# Patient Record
Sex: FEMALE | Race: WHITE | Employment: UNEMPLOYED | URBAN - METROPOLITAN AREA
[De-identification: names, ages, dates, MRNs, and addresses within clinical notes are randomized per-mention and may not be internally consistent; named-entity substitution may affect disease eponyms.]

---

## 2017-01-22 ASSESSMENT — ENCOUNTER SYMPTOMS
BLOOD IN STOOL: 0
TASTE DISTURBANCE: 0
VOMITING: 0
SINUS PAIN: 0
NAUSEA: 0
NECK MASS: 0
SORE THROAT: 0
TROUBLE SWALLOWING: 0
SMELL DISTURBANCE: 0
ABDOMINAL PAIN: 0
DIARRHEA: 1
HOARSE VOICE: 0
CONSTIPATION: 0
BOWEL INCONTINENCE: 0
HEARTBURN: 0
RECTAL BLEEDING: 0
BLOATING: 0
SINUS CONGESTION: 0
RECTAL PAIN: 0
JAUNDICE: 0

## 2017-01-25 ENCOUNTER — APPOINTMENT (OUTPATIENT)
Dept: LAB | Facility: CLINIC | Age: 57
End: 2017-01-25
Attending: NURSE PRACTITIONER
Payer: COMMERCIAL

## 2017-01-25 ENCOUNTER — RESEARCH ENCOUNTER (OUTPATIENT)
Dept: ONCOLOGY | Facility: CLINIC | Age: 57
End: 2017-01-25

## 2017-01-25 ENCOUNTER — ONCOLOGY VISIT (OUTPATIENT)
Dept: ONCOLOGY | Facility: CLINIC | Age: 57
End: 2017-01-25
Attending: NURSE PRACTITIONER
Payer: COMMERCIAL

## 2017-01-25 VITALS
SYSTOLIC BLOOD PRESSURE: 147 MMHG | DIASTOLIC BLOOD PRESSURE: 67 MMHG | TEMPERATURE: 97.5 F | WEIGHT: 141 LBS | BODY MASS INDEX: 23.46 KG/M2 | OXYGEN SATURATION: 99 % | HEART RATE: 100 BPM | RESPIRATION RATE: 18 BRPM

## 2017-01-25 DIAGNOSIS — Z79.899 ENCOUNTER FOR LONG-TERM (CURRENT) USE OF MEDICATIONS: ICD-10-CM

## 2017-01-25 DIAGNOSIS — D70.2 DRUG-INDUCED NEUTROPENIA (H): ICD-10-CM

## 2017-01-25 DIAGNOSIS — C56.2 OVARIAN CANCER, LEFT (H): ICD-10-CM

## 2017-01-25 DIAGNOSIS — Z00.6 PATIENT IN CANCER RELATED RESEARCH STUDY: ICD-10-CM

## 2017-01-25 DIAGNOSIS — C56.1 OVARIAN CANCER, RIGHT (H): Primary | ICD-10-CM

## 2017-01-25 LAB
ALBUMIN SERPL-MCNC: 3.8 G/DL (ref 3.4–5)
ALBUMIN UR-MCNC: NEGATIVE MG/DL
ALP SERPL-CCNC: 103 U/L (ref 40–150)
ALT SERPL W P-5'-P-CCNC: 23 U/L (ref 0–50)
AMYLASE SERPL-CCNC: 47 U/L (ref 30–110)
ANION GAP SERPL CALCULATED.3IONS-SCNC: 9 MMOL/L (ref 3–14)
APPEARANCE UR: CLEAR
AST SERPL W P-5'-P-CCNC: 18 U/L (ref 0–45)
BASOPHILS # BLD AUTO: 0 10E9/L (ref 0–0.2)
BASOPHILS NFR BLD AUTO: 0.6 %
BILIRUB SERPL-MCNC: 0.4 MG/DL (ref 0.2–1.3)
BILIRUB UR QL STRIP: NEGATIVE
BUN SERPL-MCNC: 17 MG/DL (ref 7–30)
CALCIUM SERPL-MCNC: 8.9 MG/DL (ref 8.5–10.1)
CANCER AG125 SERPL-ACNC: 108 U/ML (ref 0–30)
CHLORIDE SERPL-SCNC: 104 MMOL/L (ref 94–109)
CO2 SERPL-SCNC: 25 MMOL/L (ref 20–32)
COLOR UR AUTO: NORMAL
CREAT SERPL-MCNC: 0.75 MG/DL (ref 0.52–1.04)
DIFFERENTIAL METHOD BLD: ABNORMAL
EOSINOPHIL # BLD AUTO: 0.1 10E9/L (ref 0–0.7)
EOSINOPHIL NFR BLD AUTO: 2 %
ERYTHROCYTE [DISTWIDTH] IN BLOOD BY AUTOMATED COUNT: 13.7 % (ref 10–15)
GFR SERPL CREATININE-BSD FRML MDRD: 79 ML/MIN/1.7M2
GGT SERPL-CCNC: 23 U/L (ref 0–40)
GLUCOSE SERPL-MCNC: 109 MG/DL (ref 70–99)
GLUCOSE UR STRIP-MCNC: NEGATIVE MG/DL
HCT VFR BLD AUTO: 35.6 % (ref 35–47)
HGB BLD-MCNC: 12.3 G/DL (ref 11.7–15.7)
HGB UR QL STRIP: NEGATIVE
IMM GRANULOCYTES # BLD: 0.1 10E9/L (ref 0–0.4)
IMM GRANULOCYTES NFR BLD: 1.4 %
KETONES UR STRIP-MCNC: NEGATIVE MG/DL
LDH SERPL L TO P-CCNC: 153 U/L (ref 81–234)
LEUKOCYTE ESTERASE UR QL STRIP: NEGATIVE
LYMPHOCYTES # BLD AUTO: 2.7 10E9/L (ref 0.8–5.3)
LYMPHOCYTES NFR BLD AUTO: 40 %
MAGNESIUM SERPL-MCNC: 1.8 MG/DL (ref 1.6–2.3)
MCH RBC QN AUTO: 36.5 PG (ref 26.5–33)
MCHC RBC AUTO-ENTMCNC: 34.6 G/DL (ref 31.5–36.5)
MCV RBC AUTO: 106 FL (ref 78–100)
MONOCYTES # BLD AUTO: 0.6 10E9/L (ref 0–1.3)
MONOCYTES NFR BLD AUTO: 9.5 %
NEUTROPHILS # BLD AUTO: 3.1 10E9/L (ref 1.6–8.3)
NEUTROPHILS NFR BLD AUTO: 46.5 %
NITRATE UR QL: NEGATIVE
NRBC # BLD AUTO: 0 10*3/UL
NRBC BLD AUTO-RTO: 0 /100
PH UR STRIP: 6 PH (ref 5–7)
PLATELET # BLD AUTO: 253 10E9/L (ref 150–450)
POTASSIUM SERPL-SCNC: 3.8 MMOL/L (ref 3.4–5.3)
PROT SERPL-MCNC: 7.2 G/DL (ref 6.8–8.8)
RBC # BLD AUTO: 3.37 10E12/L (ref 3.8–5.2)
RBC #/AREA URNS AUTO: 1 /HPF (ref 0–2)
SODIUM SERPL-SCNC: 138 MMOL/L (ref 133–144)
SP GR UR STRIP: 1 (ref 1–1.03)
URN SPEC COLLECT METH UR: NORMAL
UROBILINOGEN UR STRIP-MCNC: 0 MG/DL (ref 0–2)
WBC # BLD AUTO: 6.6 10E9/L (ref 4–11)
WBC #/AREA URNS AUTO: <1 /HPF (ref 0–2)

## 2017-01-25 PROCEDURE — 80053 COMPREHEN METABOLIC PANEL: CPT | Performed by: NURSE PRACTITIONER

## 2017-01-25 PROCEDURE — 86304 IMMUNOASSAY TUMOR CA 125: CPT | Performed by: NURSE PRACTITIONER

## 2017-01-25 PROCEDURE — 85025 COMPLETE CBC W/AUTO DIFF WBC: CPT | Performed by: NURSE PRACTITIONER

## 2017-01-25 PROCEDURE — 82977 ASSAY OF GGT: CPT | Performed by: NURSE PRACTITIONER

## 2017-01-25 PROCEDURE — 36591 DRAW BLOOD OFF VENOUS DEVICE: CPT

## 2017-01-25 PROCEDURE — 82150 ASSAY OF AMYLASE: CPT | Performed by: NURSE PRACTITIONER

## 2017-01-25 PROCEDURE — 99212 OFFICE O/P EST SF 10 MIN: CPT | Mod: ZF

## 2017-01-25 PROCEDURE — 81001 URINALYSIS AUTO W/SCOPE: CPT | Performed by: NURSE PRACTITIONER

## 2017-01-25 PROCEDURE — 25000125 ZZHC RX 250: Mod: ZF | Performed by: NURSE PRACTITIONER

## 2017-01-25 PROCEDURE — 99213 OFFICE O/P EST LOW 20 MIN: CPT | Mod: ZP | Performed by: NURSE PRACTITIONER

## 2017-01-25 PROCEDURE — 83735 ASSAY OF MAGNESIUM: CPT | Performed by: NURSE PRACTITIONER

## 2017-01-25 PROCEDURE — 83615 LACTATE (LD) (LDH) ENZYME: CPT | Performed by: NURSE PRACTITIONER

## 2017-01-25 RX ORDER — HEPARIN SODIUM (PORCINE) LOCK FLUSH IV SOLN 100 UNIT/ML 100 UNIT/ML
5 SOLUTION INTRAVENOUS EVERY 8 HOURS
Status: DISCONTINUED | OUTPATIENT
Start: 2017-01-25 | End: 2017-01-25 | Stop reason: HOSPADM

## 2017-01-25 RX ADMIN — SODIUM CHLORIDE, PRESERVATIVE FREE 5 ML: 5 INJECTION INTRAVENOUS at 09:01

## 2017-01-25 ASSESSMENT — ENCOUNTER SYMPTOMS
EXERCISE INTOLERANCE: 0
SMELL DISTURBANCE: 0
NUMBNESS: 0
STIFFNESS: 0
SKIN CHANGES: 0
FLANK PAIN: 0
TACHYCARDIA: 0
DECREASED LIBIDO: 0
ARTHRALGIAS: 0
SEIZURES: 0
SYNCOPE: 0
CLAUDICATION: 0
TROUBLE SWALLOWING: 0
CHILLS: 0
COUGH DISTURBING SLEEP: 0
WHEEZING: 0
RECTAL PAIN: 0
PALPITATIONS: 0
DOUBLE VISION: 0
FATIGUE: 0
BACK PAIN: 0
DECREASED APPETITE: 0
COUGH: 0
EYE REDNESS: 0
POSTURAL DYSPNEA: 0
SLEEP DISTURBANCES DUE TO BREATHING: 0
HOT FLASHES: 0
NECK PAIN: 0
TREMORS: 0
MEMORY LOSS: 0
BREAST PAIN: 0
HEMOPTYSIS: 0
EYE WATERING: 0
LIGHT-HEADEDNESS: 0
MYALGIAS: 0
LEG PAIN: 0
WEAKNESS: 0
INCREASED ENERGY: 0
WEIGHT GAIN: 0
ABDOMINAL PAIN: 0
BREAST MASS: 0
RESPIRATORY PAIN: 0
WEIGHT LOSS: 0
BLOATING: 0
HALLUCINATIONS: 0
DISTURBANCES IN COORDINATION: 0
LEG SWELLING: 0
BLOOD IN STOOL: 0
NAUSEA: 0
SINUS CONGESTION: 0
MUSCLE CRAMPS: 0
SWOLLEN GLANDS: 0
DIZZINESS: 0
HEADACHES: 0
EXTREMITY NUMBNESS: 0
NECK MASS: 0
HYPOTENSION: 0
LOSS OF CONSCIOUSNESS: 0
SORE THROAT: 0
PARALYSIS: 0
DEPRESSION: 0
POLYPHAGIA: 0
ORTHOPNEA: 0
HOARSE VOICE: 0
EYE IRRITATION: 0
NERVOUS/ANXIOUS: 0
PANIC: 0
BOWEL INCONTINENCE: 0
SINUS PAIN: 0
MUSCLE WEAKNESS: 0
DYSPNEA ON EXERTION: 0
ALTERED TEMPERATURE REGULATION: 0
SPEECH CHANGE: 0
CONSTIPATION: 0
JOINT SWELLING: 0
POOR WOUND HEALING: 0
EYE PAIN: 0
DECREASED CONCENTRATION: 0
DIARRHEA: 1
DIFFICULTY URINATING: 0
NAIL CHANGES: 0
RECTAL BLEEDING: 0
JAUNDICE: 0
NIGHT SWEATS: 0
HYPERTENSION: 0
HEMATURIA: 0
TASTE DISTURBANCE: 0
TINGLING: 0
FEVER: 0
SNORES LOUDLY: 0
HEARTBURN: 0
SHORTNESS OF BREATH: 0
BRUISES/BLEEDS EASILY: 0
SPUTUM PRODUCTION: 0
POLYDIPSIA: 0
DYSURIA: 0
VOMITING: 0
INSOMNIA: 0

## 2017-01-25 ASSESSMENT — PAIN SCALES - GENERAL: PAINLEVEL: NO PAIN (0)

## 2017-01-25 NOTE — NURSING NOTE
Chief Complaint   Patient presents with     Port Draw     port accessed with power needle forlabs, heparin locked, vitals checked

## 2017-01-25 NOTE — PROGRESS NOTES
Patient here today for C9D1 of the Tesaro/Quadra Niraparib study.  Patient denies any new issues at this time.  Labs were reviewed and ok to continue study medication at current dose.  Patient returned 37 pills from bottle #[id number] along with pill diary.  Patient was dispensed bottle #[id number] along with new drug diary.  Patient's next appointments were made per protocol.      Prudence Jones RN     Pager [phone]

## 2017-01-25 NOTE — PROGRESS NOTES
Gynecologic Oncology Follow-Up Note  RE: Odalys Nathan  MRN: [id number]  : 1960  Date of Visit: 2017    CC: Odalys Nathan is a 56 year old year old female with recurrent stage IIIC bilateral ovarian cancer who presents today for follow up regarding disease management while on the TESARO niraparib trial.    HPI: Odalys comes to the clinic accompanied by her partner Marcos and daughter Amberly. She continues to feel well on niraparib. She has continued to take ferrous sulfate 325mg TID with resolution of her anemia- she is tolerating this without side effects. She reports taking iron has improved her baseline diarrhea. She continues with chronic tinnitus she has had since she was a child, denies any worsening of this. She self reports an ECOG score of 1. She is eating and drinking well and reports feeling ready to continue with niraparib. She is excited to go on vacation to [place] at the end of January through .      Brief Oncology History:  2012 - Admitted to hospital for 2 weeks of intermittent abdominal cramping, distention, diarrhea and N/V. CT of abdomen/pelvis significant for small bowel obstruction, a heterogenous soft tissue density in the pelvis, omental nodules, and ascites. Bilateral adnexal masses per U/S of pelvis. CA-125 was elevated at 987, CEA was normal at 1.0.    12 - Therapeutic paracentesis (4 L) with cytology confirming malignancy (UT positive, weak ER positive, CK-7 positive consistent with GYN primary). Surgery recommended d/t potential for falling blood counts 2/2 chemotherapy (patient is Sikh and limiting blood transfusion)    12 - Exploratory laparotomy, MAR BSO, lysis of adhesion, Appendectomy, Repair cystotomy, Omentectomy Nnhk-znputr-xlhypwfsi-transverse-colon resection, Ileo-descending colon anastomosis, CUSA, & Colonoscopy (done by Dr. Amato and colorectal team).  2012 - Admitted to hospital for bilateral pulmonary  emboli and drainage of pleural effusion. Started on Lovenox.  2/28/12-3/21/12: Cycle #1-2 Carbo/Taxol IV  3/29/12 - Started on Keflex by her PCP for infection in her healing wound (immediately below her umbilicus).    4/11/12-6/14/12: Cycle #3-6 IV chemo, Cycle #1 IV/IP chemo  7/16/12 -  8, CT PRADEEP - enrolled in [facility] [place] [facility] - observation arm  3/4/13-6/28/13:  6, 9, 12, 15, 20.  7/1/13: CT Chest, Abdomen, Pelvis IMPRESSION:  1. Worsening metastatic ovarian carcinoma suggested by increased size of soft tissue nodules anterior to the right psoas muscle, that may represent growing mesenteric lymphadenopathy.  2. Remaining prominent right lower quadrant mesenteric lymph nodes are not significantly changed from CT 7/16/2012.  3. Clustered nodular opacities in the right lower lobe are not significant change from 7/16/2012 and remain indeterminate. Again, the appearance and distribution is suggestive of an infectious etiology.  4. Stable 8 mm soft tissue nodule in left breast, unchanged since at least 02/20/2012. This can be observed on followup studies, but correlation with mammography could be considered.  Decision made to start Doxil/Carbo.  7/11/13: The left ventricular ejection fraction is normal at 66.4%.  7/12/13-9/6/13: Cycle #1-3 Doxil/Carbo.  10, 11.    9/30/13 CT C/A/P Impression:  1. Overall, favorable response to treatment with decreasing size of soft tissue nodules tracking along the anterior aspect of the right psoas muscle.  2. 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  3. Improved cluster of right lower lobe pulmonary nodular opacities. These may represent resolving infection.  10/3/13-12/9/13:  9, 8, 10. Cycle 4-6 Doxil/Carbo.    1/13/14 CT C/A/P Impression:   1. Stable appearance of metastatic ovarian cancer. Scattered soft tissue nodules along the anterior aspect of the right psoas muscle are unchanged in size. Mild mesenteric lymphadenopathy is unchanged.  2. Clustered  micronodules in the right lower lobe are unchanged from 9/30/2013, but improved from 7/1/2013. This history suggests a postinflammatory/postinfectious etiology.  3. 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  1/16/14 Discussed multiple options for her based on relatively stable-appearing disease on CT but slight increase in  (which has had small increase to 20 with last recurrence) including chemo break with recheck of  in 1 month, starting new chemo agent immediately, and exploratory surgery with possible resection of nodules. She is considered platinum-sensitive based on > 1 year remission after Taxol/Carbo, which we will take into consideration for future chemo planning. I am not inclined to surgery at this time given difficulty she already has with diarrhea secondary to past colon resection. I suspect we would need to resect further bowel due to mesenteric disease. Also explained inherent risks of any major surgery. Also mentioned maintenance chemo, but this has not been shown to increase overall survival and would likely decrease her quality of life without significant benefit. Family is going on vacation to [place] in 2 weeks and Odalys does not want to have chemo prior to that, so will plan to take 1 month break. She can have  at that time (discussed checking toady since last draw was in early December, but as it would likely not change treatment plan and she has h/o slow rising , will not check today).  2/17/14  16    2/20/14: Decision to take break from chemo for two months, followed by CT and CA-125.    4/21/14  27  4/21/14 CT C/A/P Impression:    1. Increased size of 2 low-attenuation lymph nodes anterior to the right psoas muscle is concerning for worsening metastatic ovarian cancer.    2. New circumferential thickening of a 3.8 cm length segment of distal transverse colon is likely physiologic. Recommend attention on followup imaging.    3. Grossly unchanged size  "of clustered small nodules versus scarring in the right lower lobe the lungs.    4. Stable thrombosis of the right ovarian vein.  5/14/14: Diagnostic laparoscopy converted to exploratory laparotomy and removal of mesenteric masses, tumor debulking, peritoneal biopsies and intraperitoneal port placement. On laparoscopy, it was noted that there were small nodules on the anterior abdominal wall near the previous incision, small nodules on the right pelvic sidewall as well. Nodules were palpated in the mesentery; however, as it was unable to clarify where the origin of the nodules was, the decision was made to open the patient. On opening there was found to be approximately a 3 cm nodule in the small bowel mesentery and another separate approximately 2 cm nodule in the bowel mesentery. Pelvis without evidence of cancer, some mesenteric lymph nodes were palpated. No evidence otherwise of any disseminated cancer throughout the abdomen.    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 (2/2)  -Largest metastasis: 1.3 cm  -See comment  D: Peritoneum, right paracolic gutter #2, biopsy:  -Fibroadipose tissue with granulomatous inflammation surrounding refractile material  -Negative for malignancy   E: Small bowel adhesion, biopsy:  -Fibroconnective tissue, consistent with adhesion  -Negative for malignancy  F: Lymph nodes, mesentry, not otherwise specified, excision:  -Two lymph nodes, negative for metastatic carcinoma (0/2)  G: Lymph node, mesentery, \"#2\", excision:  -One lymph node, negative for metastatic carcinoma (0/1)  H: Lymph nodes, mesentery, \"nodule #2\", excision:  -Five " lymph nodes, negative for metastatic carcinoma (0/5)  COMMENT:  Some of the specimens show post-operative changes. Others show possible treatment related changes, including necrosis. The metastatic carcinoma in the mesenteric lymph nodes (specimen C) shows variable morphology, including relatively low grade tumor with papillary architecture, and high grade tumor comprised of nests of tumor cells with irregular, slit-like spaces and marked nuclear pleomorphism.    5/29/14: Cycle 1 IV PACLitaxel / IP CISplatin / IP PACLitaxel.  - 28.  6/26/14: Cycle #2 IV/IP.  10  8/5/14: CT chest/abd/pelvis IMPRESSION     1. In this patient with ovarian cancer, overall findings are indicative of stable/slight improvement, as multiple mesenteric lymphadenopathy and scattered nodular peritoneal soft tissue mass lesions appear unchanged or slightly smaller since 4/21/2014.    2. 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 Mild dilatation of the second and the third portion of the duodenum with a narrow SMA angle. This could represent SMA syndrome, if clinically correlated.17/14:    Cycle #3 IV/IP.  10.    CT chest/abd/pelvis with contrast on 8/5/14    Impression:    1. In this patient with ovarian cancer, overall findings are indicative of stable/slight improvement, as multiple mesenteric lymphadenopathy and scattered nodular peritoneal soft tissue mass lesions appear unchanged or slightly smaller since 4/21/2014.    2. 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 Mild dilatation of the second and the third portion of the duodenum with a narrow SMA angle. This could represent SMA syndrome, if clinically correlated.  8/7/14: Cycle #4 Taxol/Carbo (changed from IV/IP).  10. She has been feeling okay. She is unsure if she can finish out the course of 6 cycles IV/IP taxol/cisplatin. She feels like she has the flu for about a week then starts feeling gradually better after each chemo  cycle. Her spouse notes that she actually has been more sick with the treatments than she initially admits here. She was also previously having some rib pain. Denies any rib pain now. Denies any chest pain or shortness of breath.  Plan: discussed recent CT cap results and switching to just IV as she is feeling miserable with IP treatments. Switch to IV carbo/taxol as patient is platinum sensitive.  We also discussed her taking part of the tesaro trial, which would require BRCA testing. She would like to take part in this trial if eligible.  8/20/14: Remove Intraperitoneal Port ( Port and catheter intact - discarded)  8/28/14: Cycle #5 Taxol/Carbo held due to thrombocytopenia.  6.    She denies any vaginal bleeding, no changes in her bowel or bladder habits, no nausea/emesis, no lower extremity edema, and no difficulties eating or sleeping. She denies any abdominal discomfort/bloating, no fevers or chills, and no chest pain or shortness of breath. She states her diarrhea is the same. She reports some fatigue which improves about 1-2 weeks after her chemotherapy. She states she does not need any medication refills and she was told she does not meet the criteria for the TESARO trial. She states she has 3 bags of iv fluids left over from her previous chemotherapy and will give these to herself. She states she is ready for her treatment today.    9/29/14: Cycle #6 Taxol/Carbo  6. Insurance questions regarding GSF coverage today. No concerns other than fatigue. Taking iron for anemia and does not desire blood transfusion. Using neulasta for neutropenia. Using home IV hydration if needed. Baseline unchanged. No abdominal bloating, constipation, diarrhea, pain, vaginal or rectal bleeding, cough or dyspnea, fluid retention.    10/16/14: Impression:    1. Nodular peritoneal soft tissue mass in the right lower quadrant adjacent to the psoas muscle is no longer appreciated. Adjacent prominent lymphadenopathy is  unchanged from previous exam. No new peritoneal lesions.    2. 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    10/20/14:  5. CT chest/abdomen/pelvis on 10/16/14 showed nodular peritoneal soft tissue mass in the right lower quadrant adjacent to the psoas muscle is no longer appreciated. Adjacent prominent lymphadenopathy is unchanged from previous exam. 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  1/27/15:  6.  4/28/15:  14.  5/26/15:  18.  6/2/15: CT cap Impression:  1. Postsurgical changes of hysterectomy and bilateral salpingo-oophorectomy for ovarian cancer. There is a new 8 mm hazy, ill-defined hypoattenuating lesion in hepatic segment 6 which is suspicious for a metastatic deposit. Further evaluation with ultrasound in recommended.    2. Increased size of a left retroperitoneal lymph node which is indeterminate but may represent a ciro metastasis. Mildly prominent lymph nodes in the right lower quadrant are not significantly changed.  3. Moderate colonic stool burden.    6/4/15: US abdomen IMPRESSION:    Hyperechoic lesion in the right hepatic lobe, consistent with hemangioma. This does not corresponds to the area of the lesion seen on CT from 6/2/2015. An MR would be helpful for identifying and characterizing the lesion from the recent CT.  6/12/15: MR abd IMPRESSION:  1. New 20 x 11 mm enhancing lesions between the right obliques, concerning for metastatic disease. This lesions should be amenable to percutaneous biopsy, if indicated.  2. Correlating to the lesion visualized on comparison CT is a hepatic segment 6 subcapsular 7 mm lesion. Overall the appearance favors the diagnosis of a simple cyst. However, there is faint suggestion of mild peripheral arterial enhancement. Although this is favored as  artifactual, this should be followed up to confirm stability. Recommend 6 month followup.  3. Hepatic segment 6, 5 mm lesion too  small to technically characterize. Differential would favor FNH, less likely flash filling hemangioma. Recommend attention on followup.  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     9/1/15: Cycle #1 Avastin/Cytoxan.   31.     9/24/15:feeling generally well. She says she has been having back and stomach spasms. She is taking cytosine daily (she ran out yesterday). She also says its affecting her voice. Admits that it burns occasionally. She is eating and drinking normal. She also admit diarrhea, 5-7 times daily, lose/watery. She trying to stay hydrated and eat fiber. She also says that her body is sore, especially the bottom of her feet. Her blood pressure is normal. She also admits having headache after her first infusion.      9/24/15: Cycle #2 Avastin/Cytoxan.  19.  10/15/15: Cycle #3 Avastin/Cytoxan.  16.     11/6/15: CT c/a/p IMPRESSION:    1. Stable postoperative change of MAR/BSO for ovarian cancer.  2. The lesion in the right flank abdominal musculature is slightly decreased in size. Otherwise, stable examination.  2. No evidence of metastatic disease in the chest.    11/20/15: Treatment planning visit,  16    11/25/15: surgical pathology report  FINAL DIAGNOSIS:  Soft tissue, right oblique muscle mass, excision:  -Recurrent ovarian serous carcinoma  -Carcinoma is present less than 1 mm from one resection margin  -Background skeletal muscle and fibroadipose tissue    1/4/16:  22  1/11/16-1/27/16: Radiation to right flank x 12 treatments  4/7/2016:  94. CT cap IMPRESSION:    In this patient with ovarian cancer status post MAR/BSO and descending/transverse colectomy:  1. No evidence for malignancy in the chest, abdomen, or pelvis.  2. Stable small hypodense segment 6 liver lesion, appears more likely benign, possibly a cyst.  5/13/16:   124.  6/3/16: PET CT IMPRESSION: In this patient with a history of ovarian cancer:  1. Hypermetabolic and enlarging periaortic and perihepatic lymphadenopathy compatible with metastatic disease, as detailed above.  2. Although hypodense lesion in hepatic segment 6 has been present since 6/2/2015 associated hypermetabolism makes this lesion highly concerning for metastatic disease.    Plan: to start Niraparib under TESARO study.     6/9/16:  137.  6/14/16: Cycle #1 Niraparib.    6/28/16: Cycle #1 D15 Niraparib.    7/5/16:  100.    7/11/16: Cycle #2 Nraparib.   83.    8/3/2016: PET CT IMPRESSION:    In this patient with known history of ovarian cancer:  1) New pleural based nodular opacities in the lateral and inferior aspects of the bilateral lower lobes, worse in the left lung. Likely infection. Close follow up is recommended.      2) Slight decrease in hypermetabolic abdominal lymphadenopathy. 2 hypermetabolic lymph nodes persist.  3) Unchanged right hepatic lobe metastatic lesion.     8/9/16: Cycle #3 Niraparib.  69.  9/6/16: Cycle #4 Niraparib.  53. Dose held due to anemia.  9/13/16: Eval for potential cycle 4 niraparib. Dose held due to anemia.  10/4/16: CT CAP impression:  IMPRESSION: In this patient with a known history of ovarian cancer:  1. There has been interval resolution of pleural-based nodular  opacities which likely represented infection.  2. Abdominal lymphadenopathy in the form of 2 portacaval lymph nodes  have not significantly changed in size, noted to be hypermetabolic on  prior PET/CT.  3. Previously demonstrated metastatic lesion in the right lobe of the  liver is not significantly changed.  10/11/16:  60  11/1/16: C6 niraparib,  75  11/29/16: C7 niraparib.  78. CT CAP impression as follows:  Target lesions (RECIST criteria):       A previously described target lesion superior to the head of the  pancreas (series 2, image 64)  (referred to as a  perihepatic node on  6/3/2016) may not be a valid target lesion because it measured less  than 1.5 cm originally. However, this particular node has decreased in  size, now measuring 7 mm in short axis versus 14 mm on 6/3/2016 when  measured in a similar fashion.       2.3 cm short axis portacaval lymph node on series 2 image 67,  previously 2.0 cm on 10/4/2016.       1.2 cm subtle hypodensity in hepatic segment 6 on series 2 image 75,  stable on multiple studies since at least 6/3/2016     Sum of diameters today: 3.5 cm. Sum of diameters 10/4/2016: 3.2 cm.  Growth = 9%.    12/27/16: C8 niraparib.  105.  1/25/17: C9 niraparib.  pending.    Past Medical History   Diagnosis Date     Refusal of blood transfusions as patient is Christianity      Ovarian cancer (H)      serous,stg IV     Pulmonary embolism (H) 2/2012     Antiplatelet or antithrombotic long-term use      Pleural effusion      Ascites      Blood clot in the legs      Subclinical hypothyroidism 4/18/2013     Short gut syndrome      Thrombosis of leg      Diabetes (H)        Past Surgical History   Procedure Laterality Date     Vascular surgery       stent left iliac vein     Hysterectomy total abd, zak salpingo-oophorectomy, node dissection, tumor debulking, combined  2/1/2012     Procedure:COMBINED HYSTERECTOMY TOTAL ABDOMINAL, BILATERAL SALPINGO-OOPHORECTOMY, NODE DISSECTION, TUMOR DEBULKING;  Exploratory Laparotomy, Total Abdominal Hysterectomy, Bilateral Salpingo-Oophorectomy, appendectomy,lysis of adhesions, ileal, ascending, transverse and splenic flexure resection, ileal descending bowel renanastomosis, incidental cystotomy repair, CUSA procedure and colonoscopy ; [facility]     Colonoscopy  2/1/2012     Procedure:COLONOSCOPY; With Biopsy; Surgeon:TONI SULLIVAN; Location:[facility] OR     Insert port vascular access       Insert port peritoneal access  4/3/2012     Procedure:INSERT PORT PERITONEAL ACCESS; Intraperitoneal Port Placement (c-arm);  Surgeon:SAMUEL CARRASCO; Location:[facility] OR     Colectomy       Laparoscopy diagnostic (gyn)  5/14/2014     Procedure: LAPAROSCOPY DIAGNOSTIC (GYN);  Surgeon: Nga Yeung MD;  Location: [facility] OR     Laparotomy, tumor debulking, combined  5/14/2014     Procedure: COMBINED LAPAROTOMY, TUMOR DEBULKING;  Surgeon: Nga Yeung MD;  Location: [facility] OR     Insert port peritoneal access  5/14/2014     Procedure: INSERT PORT PERITONEAL ACCESS;  Surgeon: Nga Yeung MD;  Location: [facility] OR     Remove catheter peritoneal N/A 8/20/2014     Procedure: REMOVE CATHETER PERITONEAL;  Surgeon: Nga Yeung MD;  Location: [facility] OR     Laparotomy exploratory Right 11/25/2015     Procedure: LAPAROTOMY EXPLORATORY;  Surgeon: Nga Yeung MD;  Location: [facility] OR       Current Outpatient Prescriptions   Medication     study - niraparib (IDS# 4759) 100 mg capsule     study - niraparib (IDS# 4759) 100 mg capsule     ferrous sulfate (IRON) 325 (65 FE) MG tablet     LEVOTHYROXINE SODIUM PO     iohexol (OMNIPAQUE) 140 MG/ML SOLN     order for DME     LORazepam (ATIVAN) 1 MG tablet     METFORMIN HCL PO     diphenoxylate-atropine (LOMOTIL) 2.5-0.025 MG per tablet     cyanocobalamin (VITAMIN B12) 1000 MCG/ML injection     ORDER FOR DME     magnesium oxide (MAG-OX) 400 MG tablet     ASPIRIN PO     potassium chloride (KLOR-CON) 20 MEQ packet     VITAMIN E NATURAL PO     Cholecalciferol (VITAMIN D PO)     HERBALS     Ascorbic Acid (VITAMIN C PO)     Calcium Carbonate-Vitamin D (CALCIUM + D PO)     study - niraparib (IDS# 4759) 100 mg capsule     Current Facility-Administered Medications   Medication     heparin 100 UNIT/ML injection 5 mL     Facility-Administered Medications Ordered in Other Visits   Medication     heparin 100 UNIT/ML injection 500 Units          Allergies   Allergen Reactions     Nkda [No Known Drug Allergies]        Family History   Problem Relation Age of Onset     CANCER Mother 69     lung, smoker      CANCER Maternal Uncle 65     brain     Colon Cancer Maternal Aunt 80     colon       Social History     Social History     Marital Status:      Spouse Name: N/A     Number of Children: N/A     Years of Education: N/A     Occupational History     Not on file.     Social History Main Topics     Smoking status: Former Smoker -- 8 years     Types: Cigarettes     Quit date: 06/21/1980     Smokeless tobacco: Never Used      Comment: started at 9 yo and quit at 18 yo     Alcohol Use: Yes      Comment: 3x/day wine or jodi     Drug Use: No     Sexual Activity: Not on file     Other Topics Concern     Not on file     Social History Narrative       Review of Systems     Constitutional:  Negative for fever, chills, weight loss, weight gain, fatigue, decreased appetite, night sweats, recent stressors, height gain, height loss, post-operative complications, incisional pain, hallucinations, increased energy, hyperactivity and confused.   HENT:  Positive for tinnitus. Negative for ear pain, hearing loss, nosebleeds, trouble swallowing, hoarse voice, mouth sores, sore throat, ear discharge, tooth pain, gum tenderness, taste disturbance, smell disturbance, hearing aid, bleeding gums, dry mouth, sinus pain, sinus congestion and neck mass.    Eyes:  Negative for double vision, pain, redness, eye pain, decreased vision, eye watering, eye bulging, eye dryness, flashing lights, spots, floaters, strabismus, tunnel vision, jaundice and eye irritation.   Respiratory:   Negative for cough, hemoptysis, sputum production, shortness of breath, wheezing, sleep disturbances due to breathing, snores loudly, respiratory pain, dyspnea on exertion, cough disturbing sleep and postural dyspnea.    Cardiovascular:  Negative for chest pain, dyspnea on exertion, palpitations, orthopnea, claudication, leg swelling, fingers/toes turn blue, hypertension, hypotension, syncope, history of heart murmur, chest pain on exertion, chest pain at rest,  pacemaker, few scattered varicosities, leg pain, sleep disturbances due to breathing, tachycardia, light-headedness, exercise intolerance and edema.   Gastrointestinal:  Positive for diarrhea. Negative for heartburn, nausea, vomiting, abdominal pain, constipation, blood in stool, melena, rectal pain, bloating, hemorrhoids, bowel incontinence, jaundice, rectal bleeding, coffee ground emesis and change in stool.   Genitourinary:  Negative for bladder incontinence, dysuria, urgency, hematuria, flank pain, vaginal discharge, difficulty urinating, genital sores, dyspareunia, decreased libido, nocturia, voiding less frequently, arousal difficulty, abnormal vaginal bleeding, excessive menstruation, menstrual changes, hot flashes, vaginal dryness and postmenopausal bleeding.   Musculoskeletal:  Negative for myalgias, back pain, joint swelling, arthralgias, stiffness, muscle cramps, neck pain, bone pain, muscle weakness and fracture.   Skin:  Negative for nail changes, itching, poor wound healing, rash, hair changes, skin changes, acne, warts, poor wound healing, scarring, flaky skin, Raynaud's phenomenon, sensitivity to sunlight and skin thickening.   Neurological:  Negative for dizziness, tingling, tremors, speech change, seizures, loss of consciousness, weakness, light-headedness, numbness, headaches, disturbances in coordination, extremity numbness, memory loss, difficulty walking and paralysis.   Endo/Heme:  Negative for anemia, swollen glands and bruises/bleeds easily.   Psychiatric/Behavioral:  Negative for depression, hallucinations, memory loss, decreased concentration, mood swings and panic attacks.    Breast:  Negative for breast discharge, breast mass, breast pain and nipple retraction.   Endocrine:  Negative for altered temperature regulation, polyphagia, polydipsia, unwanted hair growth and change in facial hair.        Physical Exam:    /67 mmHg  Pulse 100  Temp(Src) 97.5  F (36.4  C)  Resp 18  Wt  63.957 kg (141 lb)  SpO2 99%    General: Alert non-toxic appearing female in no acute distress  HEENT: Normocephalic, atraumatic; PERRLA; no scleral icterus; oropharynx pink without lesions; neck supple without lymphadenopathy  Pulmonary: Lungs clear to auscultation, no increased work of breathing noted  Cardiac: Tachycardic, regular rhythm, S1S2, no clicks, murmurs, rubs, or gallops; bilateral lower extremities without edema, dorsalis pedis pulses 2+ bilaterally  GI: Normoactive bowel sounds x4 quadrants, abdomen soft, non-distended, and non-tender to palpation without masses or organomegaly  : Not indicated  Heme: Cervical, supraclavicular, and inguinal nodes without lymphadenopathy  MSK: Moves all extremities, no obvious muscle wasting  Neuro: No gross deficits, normal gait  Skin: Appropriate color for race, warm and dry, no rashes or lesions to unclothed skin  Psych: Pleasant and interactive, affect bright, makes appropriate eye contact, thought process linear    Labs:      1/25/2017   Hemoglobin 11.7 - 15.7 g/dL 12.3   Hematocrit 35.0 - 47.0 % 35.6   Platelet Count 150 - 450 10e9/L 253   Absolute Neutrophil 1.6 - 8.3 10e9/L 3.1   Sodium 133 - 144 mmol/L 138   Potassium 3.4 - 5.3 mmol/L 3.8   Chloride 94 - 109 mmol/L 104   Carbon Dioxide 20 - 32 mmol/L 25   Urea Nitrogen 7 - 30 mg/dL 17   Creatinine 0.52 - 1.04 mg/dL 0.75   Calcium 8.5 - 10.1 mg/dL 8.9   Magnesium 1.6 - 2.3 mg/dL 1.8   Bilirubin Total 0.2 - 1.3 mg/dL 0.4   ALT 0 - 50 U/L 23   AST 0 - 45 U/L 18   Alkaline Phosphatase 40 - 150 U/L 103   Albumin 3.4 - 5.0 g/dL 3.8   Protein Total 6.8 - 8.8 g/dL 7.2   LD 81 - 234 U/L 153   Amylase 30 - 110 U/L 47   WBC 4.0 - 11.0 10e9/L 6.6   UA pending   pending    Assessment/Plan:  1) Recurrent stage IIIC bilateral ovarian cancer: Patient tolerating without dose limiting side effects. Proceed with cycle nine niraparib. Due for CT CAP with RECIST prior to cycle ten, orders placed. Reviewed signs and  symptoms for when she should contact the clinic or seek additional care, including but not limited to fever, chills, inability to keep down food or fluids, nausea and vomiting not controlled with antiemetics, and diarrhea leading to dehydration. Patient to contact the clinic with any questions or concerns in the interim. Betzy Jones RN in for teaching and study follow up.  2) Genetic counseling: Testing has been performed and she is negative for mutations in BRCA1, BRCA2, EPCAM, MLH1, MSH2, MSH6, PMS2, PTEN, and TP53 genes  3) Health maintenance issues discussed include to continue following up with PCP for non-gynecologic concerns.  4) Patient verbalized understanding of and agreement with plan    A total of 20 minutes spent with patient, over 50% spent in counseling and coordination of care.    HAILE De Paz, FNP-C  Family Nurse Practitioner  Gynecologic Oncology  [facility]  Pager: [phone]

## 2017-01-25 NOTE — Clinical Note
2017       RE: Odalys Nathan  [id number]  [facility] [id number]     Dear Colleague,    Thank you for referring your patient, Odalys Nathan, to the [facility]. Please see a copy of my visit note below.    Gynecologic Oncology Follow-Up Note  RE: Odalys Nathan  MRN: [id number]  : 1960  Date of Visit: 2017    CC: Odalys Nathan is a 56 year old year old female with recurrent stage IIIC bilateral ovarian cancer who presents today for follow up regarding disease management while on the TESARO niraparib trial.    HPI: Odalys comes to the clinic accompanied by her partner Marcos and daughter Amberly. She continues to feel well on niraparib. She has continued to take ferrous sulfate 325mg TID with resolution of her anemia- she is tolerating this without side effects. She reports taking iron has improved her baseline diarrhea. She continues with chronic tinnitus she has had since she was a child, denies any worsening of this. She self reports an ECOG score of 1. She is eating and drinking well and reports feeling ready to continue with niraparib. She is excited to go on vacation to [place] at the end of January through .      Brief Oncology History:  2012 - Admitted to hospital for 2 weeks of intermittent abdominal cramping, distention, diarrhea and N/V. CT of abdomen/pelvis significant for small bowel obstruction, a heterogenous soft tissue density in the pelvis, omental nodules, and ascites. Bilateral adnexal masses per U/S of pelvis. CA-125 was elevated at 987, CEA was normal at 1.0.    12 - Therapeutic paracentesis (4 L) with cytology confirming malignancy (OH positive, weak ER positive, CK-7 positive consistent with GYN primary). Surgery recommended d/t potential for falling blood counts 2/2 chemotherapy (patient is [employer]ism and limiting blood transfusion)    12 - Exploratory laparotomy, MAR BSO, lysis of adhesion,  Appendectomy, Repair cystotomy, Omentectomy Hnyn-qxjcry-gddcwbnoi-transverse-colon resection, Ileo-descending colon anastomosis, CUSA, & Colonoscopy (done by Dr. Amato and colorectal team).  2/20/2012 - Admitted to hospital for bilateral pulmonary emboli and drainage of pleural effusion. Started on Lovenox.  2/28/12-3/21/12: Cycle #1-2 Carbo/Taxol IV  3/29/12 - Started on Keflex by her PCP for infection in her healing wound (immediately below her umbilicus).    4/11/12-6/14/12: Cycle #3-6 IV chemo, Cycle #1 IV/IP chemo  7/16/12 -  8, CT PRADEEP - enrolled in  - observation arm  3/4/13-6/28/13:  6, 9, 12, 15, 20.  7/1/13: CT Chest, Abdomen, Pelvis IMPRESSION:  1. Worsening metastatic ovarian carcinoma suggested by increased size of soft tissue nodules anterior to the right psoas muscle, that may represent growing mesenteric lymphadenopathy.  2. Remaining prominent right lower quadrant mesenteric lymph nodes are not significantly changed from CT 7/16/2012.  3. Clustered nodular opacities in the right lower lobe are not significant change from 7/16/2012 and remain indeterminate. Again, the appearance and distribution is suggestive of an infectious etiology.  4. Stable 8 mm soft tissue nodule in left breast, unchanged since at least 02/20/2012. This can be observed on followup studies, but correlation with mammography could be considered.  Decision made to start Doxil/Carbo.  7/11/13: The left ventricular ejection fraction is normal at 66.4%.  7/12/13-9/6/13: Cycle #1-3 Doxil/Carbo.  10, 11.    9/30/13 CT C/A/P Impression:  1. Overall, favorable response to treatment with decreasing size of soft tissue nodules tracking along the anterior aspect of the right psoas muscle.  2. 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  3. Improved cluster of right lower lobe pulmonary nodular opacities. These may represent resolving infection.  10/3/13-12/9/13:  9, 8, 10. Cycle 4-6 Doxil/Carbo.    1/13/14  CT C/A/P Impression:   1. Stable appearance of metastatic ovarian cancer. Scattered soft tissue nodules along the anterior aspect of the right psoas muscle are unchanged in size. Mild mesenteric lymphadenopathy is unchanged.  2. Clustered micronodules in the right lower lobe are unchanged from 9/30/2013, but improved from 7/1/2013. This history suggests a postinflammatory/postinfectious etiology.  3. 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  1/16/14 Discussed multiple options for her based on relatively stable-appearing disease on CT but slight increase in  (which has had small increase to 20 with last recurrence) including chemo break with recheck of  in 1 month, starting new chemo agent immediately, and exploratory surgery with possible resection of nodules. She is considered platinum-sensitive based on > 1 year remission after Taxol/Carbo, which we will take into consideration for future chemo planning. I am not inclined to surgery at this time given difficulty she already has with diarrhea secondary to past colon resection. I suspect we would need to resect further bowel due to mesenteric disease. Also explained inherent risks of any major surgery. Also mentioned maintenance chemo, but this has not been shown to increase overall survival and would likely decrease her quality of life without significant benefit. Family is going on vacation to [place] in 2 weeks and Odalys does not want to have chemo prior to that, so will plan to take 1 month break. She can have  at that time (discussed checking toady since last draw was in early December, but as it would likely not change treatment plan and she has h/o slow rising , will not check today).  2/17/14  16    2/20/14: Decision to take break from chemo for two months, followed by CT and CA-125.    4/21/14  27  4/21/14 CT C/A/P Impression:    1. Increased size of 2 low-attenuation lymph nodes anterior to the right psoas muscle  "is concerning for worsening metastatic ovarian cancer.    2. New circumferential thickening of a 3.8 cm length segment of distal transverse colon is likely physiologic. Recommend attention on followup imaging.    3. Grossly unchanged size of clustered small nodules versus scarring in the right lower lobe the lungs.    4. Stable thrombosis of the right ovarian vein.  5/14/14: Diagnostic laparoscopy converted to exploratory laparotomy and removal of mesenteric masses, tumor debulking, peritoneal biopsies and intraperitoneal port placement. On laparoscopy, it was noted that there were small nodules on the anterior abdominal wall near the previous incision, small nodules on the right pelvic sidewall as well. Nodules were palpated in the mesentery; however, as it was unable to clarify where the origin of the nodules was, the decision was made to open the patient. On opening there was found to be approximately a 3 cm nodule in the small bowel mesentery and another separate approximately 2 cm nodule in the bowel mesentery. Pelvis without evidence of cancer, some mesenteric lymph nodes were palpated. No evidence otherwise of any disseminated cancer throughout the abdomen.    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 (2/2)  -Largest metastasis: 1.3 cm  -See comment  D: Peritoneum, right paracolic gutter #2, biopsy:  -Fibroadipose tissue with granulomatous inflammation surrounding refractile material  -Negative for malignancy   E: Small bowel adhesion, biopsy:  -Fibroconnective tissue, consistent with adhesion  -Negative for malignancy  F: Lymph nodes, mesentry, not otherwise " "specified, excision:  -Two lymph nodes, negative for metastatic carcinoma (0/2)  G: Lymph node, mesentery, \"#2\", excision:  -One lymph node, negative for metastatic carcinoma (0/1)  H: Lymph nodes, mesentery, \"nodule #2\", excision:  -Five lymph nodes, negative for metastatic carcinoma (0/5)  COMMENT:  Some of the specimens show post-operative changes. Others show possible treatment related changes, including necrosis. The metastatic carcinoma in the mesenteric lymph nodes (specimen C) shows variable morphology, including relatively low grade tumor with papillary architecture, and high grade tumor comprised of nests of tumor cells with irregular, slit-like spaces and marked nuclear pleomorphism.    5/29/14: Cycle 1 IV PACLitaxel / IP CISplatin / IP PACLitaxel.  - 28.  6/26/14: Cycle #2 IV/IP.  10  8/5/14: CT chest/abd/pelvis IMPRESSION     1. In this patient with ovarian cancer, overall findings are indicative of stable/slight improvement, as multiple mesenteric lymphadenopathy and scattered nodular peritoneal soft tissue mass lesions appear unchanged or slightly smaller since 4/21/2014.    2. 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 Mild dilatation of the second and the third portion of the duodenum with a narrow SMA angle. This could represent SMA syndrome, if clinically correlated.17/14:    Cycle #3 IV/IP.  10.    CT chest/abd/pelvis with contrast on 8/5/14    Impression:    1. In this patient with ovarian cancer, overall findings are indicative of stable/slight improvement, as multiple mesenteric lymphadenopathy and scattered nodular peritoneal soft tissue mass lesions appear unchanged or slightly smaller since 4/21/2014.    2. 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 Mild dilatation of the second and the third portion of the duodenum with a narrow SMA angle. This could represent SMA syndrome, if clinically correlated.  8/7/14: Cycle #4 Taxol/Carbo (changed " from IV/IP).  10. She has been feeling okay. She is unsure if she can finish out the course of 6 cycles IV/IP taxol/cisplatin. She feels like she has the flu for about a week then starts feeling gradually better after each chemo cycle. Her spouse notes that she actually has been more sick with the treatments than she initially admits here. She was also previously having some rib pain. Denies any rib pain now. Denies any chest pain or shortness of breath.  Plan: discussed recent CT cap results and switching to just IV as she is feeling miserable with IP treatments. Switch to IV carbo/taxol as patient is platinum sensitive.  We also discussed her taking part of the tesaro trial, which would require BRCA testing. She would like to take part in this trial if eligible.  8/20/14: Remove Intraperitoneal Port ( Port and catheter intact - discarded)  8/28/14: Cycle #5 Taxol/Carbo held due to thrombocytopenia.  6.    She denies any vaginal bleeding, no changes in her bowel or bladder habits, no nausea/emesis, no lower extremity edema, and no difficulties eating or sleeping. She denies any abdominal discomfort/bloating, no fevers or chills, and no chest pain or shortness of breath. She states her diarrhea is the same. She reports some fatigue which improves about 1-2 weeks after her chemotherapy. She states she does not need any medication refills and she was told she does not meet the criteria for the TESARO trial. She states she has 3 bags of iv fluids left over from her previous chemotherapy and will give these to herself. She states she is ready for her treatment today.    9/29/14: Cycle #6 Taxol/Carbo  6. Insurance questions regarding GSF coverage today. No concerns other than fatigue. Taking iron for anemia and does not desire blood transfusion. Using neulasta for neutropenia. Using home IV hydration if needed. Baseline unchanged. No abdominal bloating, constipation, diarrhea, pain, vaginal or rectal  bleeding, cough or dyspnea, fluid retention.    10/16/14: Impression:    1. Nodular peritoneal soft tissue mass in the right lower quadrant adjacent to the psoas muscle is no longer appreciated. Adjacent prominent lymphadenopathy is unchanged from previous exam. No new peritoneal lesions.    2. 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    10/20/14:  5. CT chest/abdomen/pelvis on 10/16/14 showed nodular peritoneal soft tissue mass in the right lower quadrant adjacent to the psoas muscle is no longer appreciated. Adjacent prominent lymphadenopathy is unchanged from previous exam. 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  1/27/15:  6.  4/28/15:  14.  5/26/15:  18.  6/2/15: CT cap Impression:  1. Postsurgical changes of hysterectomy and bilateral salpingo-oophorectomy for ovarian cancer. There is a new 8 mm hazy, ill-defined hypoattenuating lesion in hepatic segment 6 which is suspicious for a metastatic deposit. Further evaluation with ultrasound in recommended.    2. Increased size of a left retroperitoneal lymph node which is indeterminate but may represent a ciro metastasis. Mildly prominent lymph nodes in the right lower quadrant are not significantly changed.  3. Moderate colonic stool burden.    6/4/15: US abdomen IMPRESSION:    Hyperechoic lesion in the right hepatic lobe, consistent with hemangioma. This does not corresponds to the area of the lesion seen on CT from 6/2/2015. An MR would be helpful for identifying and characterizing the lesion from the recent CT.  6/12/15: MR abd IMPRESSION:  1. New 20 x 11 mm enhancing lesions between the right obliques, concerning for metastatic disease. This lesions should be amenable to percutaneous biopsy, if indicated.  2. Correlating to the lesion visualized on comparison CT is a hepatic segment 6 subcapsular 7 mm lesion. Overall the appearance favors the diagnosis of a simple cyst.  However, there is faint suggestion of mild peripheral arterial enhancement. Although this is favored as  artifactual, this should be followed up to confirm stability. Recommend 6 month followup.  3. Hepatic segment 6, 5 mm lesion too small to technically characterize. Differential would favor FNH, less likely flash filling hemangioma. Recommend attention on followup.  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     9/1/15: Cycle #1 Avastin/Cytoxan.   31.     9/24/15:feeling generally well. She says she has been having back and stomach spasms. She is taking cytosine daily (she ran out yesterday). She also says its affecting her voice. Admits that it burns occasionally. She is eating and drinking normal. She also admit diarrhea, 5-7 times daily, lose/watery. She trying to stay hydrated and eat fiber. She also says that her body is sore, especially the bottom of her feet. Her blood pressure is normal. She also admits having headache after her first infusion.      9/24/15: Cycle #2 Avastin/Cytoxan.  19.  10/15/15: Cycle #3 Avastin/Cytoxan.  16.     11/6/15: CT c/a/p IMPRESSION:    1. Stable postoperative change of MAR/BSO for ovarian cancer.  2. The lesion in the right flank abdominal musculature is slightly decreased in size. Otherwise, stable examination.  2. No evidence of metastatic disease in the chest.    11/20/15: Treatment planning visit,  16    11/25/15: surgical pathology report  FINAL DIAGNOSIS:  Soft tissue, right oblique muscle mass, excision:  -Recurrent ovarian serous carcinoma  -Carcinoma is present less than 1 mm from one resection margin  -Background skeletal muscle and fibroadipose tissue    1/4/16:  22  1/11/16-1/27/16: Radiation to right flank x 12 treatments  4/7/2016:  94. CT cap IMPRESSION:    In this patient with ovarian cancer  status post MAR/BSO and descending/transverse colectomy:  1. No evidence for malignancy in the chest, abdomen, or pelvis.  2. Stable small hypodense segment 6 liver lesion, appears more likely benign, possibly a cyst.  5/13/16:  124.  6/3/16: PET CT IMPRESSION: In this patient with a history of ovarian cancer:  1. Hypermetabolic and enlarging periaortic and perihepatic lymphadenopathy compatible with metastatic disease, as detailed above.  2. Although hypodense lesion in hepatic segment 6 has been present since 6/2/2015 associated hypermetabolism makes this lesion highly concerning for metastatic disease.    Plan: to start Niraparib under TESARO study.     6/9/16:  137.  6/14/16: Cycle #1 Niraparib.    6/28/16: Cycle #1 D15 Niraparib.    7/5/16:  100.    7/11/16: Cycle #2 Nraparib.   83.    8/3/2016: PET CT IMPRESSION:    In this patient with known history of ovarian cancer:  1) New pleural based nodular opacities in the lateral and inferior aspects of the bilateral lower lobes, worse in the left lung. Likely infection. Close follow up is recommended.      2) Slight decrease in hypermetabolic abdominal lymphadenopathy. 2 hypermetabolic lymph nodes persist.  3) Unchanged right hepatic lobe metastatic lesion.     8/9/16: Cycle #3 Niraparib.  69.  9/6/16: Cycle #4 Niraparib.  53. Dose held due to anemia.  9/13/16: Eval for potential cycle 4 niraparib. Dose held due to anemia.  10/4/16: CT CAP impression:  IMPRESSION: In this patient with a known history of ovarian cancer:  1. There has been interval resolution of pleural-based nodular  opacities which likely represented infection.  2. Abdominal lymphadenopathy in the form of 2 portacaval lymph nodes  have not significantly changed in size, noted to be hypermetabolic on  prior PET/CT.  3. Previously demonstrated metastatic lesion in the right lobe of the  liver is not significantly changed.  10/11/16:  60  11/1/16: C6 niraparib,   75  11/29/16: C7 niraparib.  78. CT CAP impression as follows:  Target lesions (RECIST criteria):       A previously described target lesion superior to the head of the  pancreas (series 2, image 64)  (referred to as a perihepatic node on  6/3/2016) may not be a valid target lesion because it measured less  than 1.5 cm originally. However, this particular node has decreased in  size, now measuring 7 mm in short axis versus 14 mm on 6/3/2016 when  measured in a similar fashion.       2.3 cm short axis portacaval lymph node on series 2 image 67,  previously 2.0 cm on 10/4/2016.       1.2 cm subtle hypodensity in hepatic segment 6 on series 2 image 75,  stable on multiple studies since at least 6/3/2016     Sum of diameters today: 3.5 cm. Sum of diameters 10/4/2016: 3.2 cm.  Growth = 9%.    12/27/16: C8 niraparib.  105.  1/25/17: C9 niraparib.  pending.    Past Medical History   Diagnosis Date     Refusal of blood transfusions as patient is [employer]      Ovarian cancer (H)      serous,stg IV     Pulmonary embolism (H) 2/2012     Antiplatelet or antithrombotic long-term use      Pleural effusion      Ascites      Blood clot in the legs      Subclinical hypothyroidism 4/18/2013     Short gut syndrome      Thrombosis of leg      Diabetes (H)        Past Surgical History   Procedure Laterality Date     Vascular surgery       stent left iliac vein     Hysterectomy total abd, zak salpingo-oophorectomy, node dissection, tumor debulking, combined  2/1/2012     Procedure:COMBINED HYSTERECTOMY TOTAL ABDOMINAL, BILATERAL SALPINGO-OOPHORECTOMY, NODE DISSECTION, TUMOR DEBULKING;  Exploratory Laparotomy, Total Abdominal Hysterectomy, Bilateral Salpingo-Oophorectomy, appendectomy,lysis of adhesions, ileal, ascending, transverse and splenic flexure resection, ileal descending bowel renanastomosis, incidental cystotomy repair, CUSA procedure and colonoscopy ; [facility]     Colonoscopy  2/1/2012      Procedure:COLONOSCOPY; With Biopsy; Surgeon:TONI SULLIVAN; Location:[facility] OR     Insert port vascular access       Insert port peritoneal access  4/3/2012     Procedure:INSERT PORT PERITONEAL ACCESS; Intraperitoneal Port Placement (c-arm); Surgeon:SAMUEL CARRASCO; Location:[facility] OR     Colectomy       Laparoscopy diagnostic (gyn)  5/14/2014     Procedure: LAPAROSCOPY DIAGNOSTIC (GYN);  Surgeon: Nga Yeung MD;  Location: [facility] OR     Laparotomy, tumor debulking, combined  5/14/2014     Procedure: COMBINED LAPAROTOMY, TUMOR DEBULKING;  Surgeon: Nga Yeung MD;  Location: [facility] OR     Insert port peritoneal access  5/14/2014     Procedure: INSERT PORT PERITONEAL ACCESS;  Surgeon: Nga Yeung MD;  Location: [facility] OR     Remove catheter peritoneal N/A 8/20/2014     Procedure: REMOVE CATHETER PERITONEAL;  Surgeon: Nga Yeung MD;  Location: [facility] OR     Laparotomy exploratory Right 11/25/2015     Procedure: LAPAROTOMY EXPLORATORY;  Surgeon: Nga Yeung MD;  Location: [facility] OR       Current Outpatient Prescriptions   Medication     study - niraparib (IDS# 4759) 100 mg capsule     study - niraparib (IDS# 4759) 100 mg capsule     ferrous sulfate (IRON) 325 (65 FE) MG tablet     LEVOTHYROXINE SODIUM PO     iohexol (OMNIPAQUE) 140 MG/ML SOLN     order for DME     LORazepam (ATIVAN) 1 MG tablet     METFORMIN HCL PO     diphenoxylate-atropine (LOMOTIL) 2.5-0.025 MG per tablet     cyanocobalamin (VITAMIN B12) 1000 MCG/ML injection     ORDER FOR DME     magnesium oxide (MAG-OX) 400 MG tablet     ASPIRIN PO     potassium chloride (KLOR-CON) 20 MEQ packet     VITAMIN E NATURAL PO     Cholecalciferol (VITAMIN D PO)     HERBALS     Ascorbic Acid (VITAMIN C PO)     Calcium Carbonate-Vitamin D (CALCIUM + D PO)     study - niraparib (IDS# 4759) 100 mg capsule     Current Facility-Administered Medications   Medication     heparin 100 UNIT/ML injection 5 mL     Facility-Administered Medications Ordered in  Other Visits   Medication     heparin 100 UNIT/ML injection 500 Units          Allergies   Allergen Reactions     Nkda [No Known Drug Allergies]        Family History   Problem Relation Age of Onset     CANCER Mother 69     lung, smoker     CANCER Maternal Uncle 65     brain     Colon Cancer Maternal Aunt 80     colon       Social History     Social History     Marital Status:      Spouse Name: N/A     Number of Children: N/A     Years of Education: N/A     Occupational History     Not on file.     Social History Main Topics     Smoking status: Former Smoker -- 8 years     Types: Cigarettes     Quit date: 06/21/1980     Smokeless tobacco: Never Used      Comment: started at 9 yo and quit at 18 yo     Alcohol Use: Yes      Comment: 3x/day wine or jodi     Drug Use: No     Sexual Activity: Not on file     Other Topics Concern     Not on file     Social History Narrative       Review of Systems     Constitutional:  Negative for fever, chills, weight loss, weight gain, fatigue, decreased appetite, night sweats, recent stressors, height gain, height loss, post-operative complications, incisional pain, hallucinations, increased energy, hyperactivity and confused.   HENT:  Positive for tinnitus. Negative for ear pain, hearing loss, nosebleeds, trouble swallowing, hoarse voice, mouth sores, sore throat, ear discharge, tooth pain, gum tenderness, taste disturbance, smell disturbance, hearing aid, bleeding gums, dry mouth, sinus pain, sinus congestion and neck mass.    Eyes:  Negative for double vision, pain, redness, eye pain, decreased vision, eye watering, eye bulging, eye dryness, flashing lights, spots, floaters, strabismus, tunnel vision, jaundice and eye irritation.   Respiratory:   Negative for cough, hemoptysis, sputum production, shortness of breath, wheezing, sleep disturbances due to breathing, snores loudly, respiratory pain, dyspnea on exertion, cough disturbing sleep and postural dyspnea.     Cardiovascular:  Negative for chest pain, dyspnea on exertion, palpitations, orthopnea, claudication, leg swelling, fingers/toes turn blue, hypertension, hypotension, syncope, history of heart murmur, chest pain on exertion, chest pain at rest, pacemaker, few scattered varicosities, leg pain, sleep disturbances due to breathing, tachycardia, light-headedness, exercise intolerance and edema.   Gastrointestinal:  Positive for diarrhea. Negative for heartburn, nausea, vomiting, abdominal pain, constipation, blood in stool, melena, rectal pain, bloating, hemorrhoids, bowel incontinence, jaundice, rectal bleeding, coffee ground emesis and change in stool.   Genitourinary:  Negative for bladder incontinence, dysuria, urgency, hematuria, flank pain, vaginal discharge, difficulty urinating, genital sores, dyspareunia, decreased libido, nocturia, voiding less frequently, arousal difficulty, abnormal vaginal bleeding, excessive menstruation, menstrual changes, hot flashes, vaginal dryness and postmenopausal bleeding.   Musculoskeletal:  Negative for myalgias, back pain, joint swelling, arthralgias, stiffness, muscle cramps, neck pain, bone pain, muscle weakness and fracture.   Skin:  Negative for nail changes, itching, poor wound healing, rash, hair changes, skin changes, acne, warts, poor wound healing, scarring, flaky skin, Raynaud's phenomenon, sensitivity to sunlight and skin thickening.   Neurological:  Negative for dizziness, tingling, tremors, speech change, seizures, loss of consciousness, weakness, light-headedness, numbness, headaches, disturbances in coordination, extremity numbness, memory loss, difficulty walking and paralysis.   Endo/Heme:  Negative for anemia, swollen glands and bruises/bleeds easily.   Psychiatric/Behavioral:  Negative for depression, hallucinations, memory loss, decreased concentration, mood swings and panic attacks.    Breast:  Negative for breast discharge, breast mass, breast pain and  nipple retraction.   Endocrine:  Negative for altered temperature regulation, polyphagia, polydipsia, unwanted hair growth and change in facial hair.        Physical Exam:    /67 mmHg  Pulse 100  Temp(Src) 97.5  F (36.4  C)  Resp 18  Wt 63.957 kg (141 lb)  SpO2 99%    General: Alert non-toxic appearing female in no acute distress  HEENT: Normocephalic, atraumatic; PERRLA; no scleral icterus; oropharynx pink without lesions; neck supple without lymphadenopathy  Pulmonary: Lungs clear to auscultation, no increased work of breathing noted  Cardiac: Tachycardic, regular rhythm, S1S2, no clicks, murmurs, rubs, or gallops; bilateral lower extremities without edema, dorsalis pedis pulses 2+ bilaterally  GI: Normoactive bowel sounds x4 quadrants, abdomen soft, non-distended, and non-tender to palpation without masses or organomegaly  : Not indicated  Heme: Cervical, supraclavicular, and inguinal nodes without lymphadenopathy  MSK: Moves all extremities, no obvious muscle wasting  Neuro: No gross deficits, normal gait  Skin: Appropriate color for race, warm and dry, no rashes or lesions to unclothed skin  Psych: Pleasant and interactive, affect bright, makes appropriate eye contact, thought process linear    Labs:      1/25/2017   Hemoglobin 11.7 - 15.7 g/dL 12.3   Hematocrit 35.0 - 47.0 % 35.6   Platelet Count 150 - 450 10e9/L 253   Absolute Neutrophil 1.6 - 8.3 10e9/L 3.1   Sodium 133 - 144 mmol/L 138   Potassium 3.4 - 5.3 mmol/L 3.8   Chloride 94 - 109 mmol/L 104   Carbon Dioxide 20 - 32 mmol/L 25   Urea Nitrogen 7 - 30 mg/dL 17   Creatinine 0.52 - 1.04 mg/dL 0.75   Calcium 8.5 - 10.1 mg/dL 8.9   Magnesium 1.6 - 2.3 mg/dL 1.8   Bilirubin Total 0.2 - 1.3 mg/dL 0.4   ALT 0 - 50 U/L 23   AST 0 - 45 U/L 18   Alkaline Phosphatase 40 - 150 U/L 103   Albumin 3.4 - 5.0 g/dL 3.8   Protein Total 6.8 - 8.8 g/dL 7.2   LD 81 - 234 U/L 153   Amylase 30 - 110 U/L 47   WBC 4.0 - 11.0 10e9/L 6.6   UA pending    pending    Assessment/Plan:  1) Recurrent stage IIIC bilateral ovarian cancer: Patient tolerating without dose limiting side effects. Proceed with cycle nine niraparib. Due for CT CAP with RECIST prior to cycle ten, orders placed. Reviewed signs and symptoms for when she should contact the clinic or seek additional care, including but not limited to fever, chills, inability to keep down food or fluids, nausea and vomiting not controlled with antiemetics, and diarrhea leading to dehydration. Patient to contact the clinic with any questions or concerns in the interim. Betzy Jones RN in for teaching and study follow up.  2) Genetic counseling: Testing has been performed and she is negative for mutations in BRCA1, BRCA2, EPCAM, MLH1, MSH2, MSH6, PMS2, PTEN, and TP53 genes  3) Health maintenance issues discussed include to continue following up with PCP for non-gynecologic concerns.  4) Patient verbalized understanding of and agreement with plan    A total of 20 minutes spent with patient, over 50% spent in counseling and coordination of care.    HAILE De Paz, FNP-C  Family Nurse Practitioner  Gynecologic Oncology  [facility]  Pager: [phone]

## 2017-01-25 NOTE — NURSING NOTE
"Odalys Nathan is a 56 year old female who presents for:  Chief Complaint   Patient presents with     Port Draw     port accessed with power needle forlabs, heparin locked, vitals checked     Oncology Clinic Visit     Ovarian CA        Initial Vitals:  /67 mmHg  Pulse 100  Temp(Src) 97.5  F (36.4  C)  Resp 18  Wt 63.957 kg (141 lb)  SpO2 99% Estimated body mass index is 23.46 kg/(m^2) as calculated from the following:    Height as of 12/27/16: 1.651 m (5' 5\").    Weight as of this encounter: 63.957 kg (141 lb).. There is no height on file to calculate BSA. BP completed using cuff size: regular  No Pain (0) No LMP recorded. Patient has had a hysterectomy. Allergies and medications reviewed.     Medications: Medication refills not needed today.  Pharmacy name entered into [employer]:    [id number] - [place], [place] - [address] [facility] - [place], [place] - [address]  [facility] - [place], [place] - [address]    Comments: UA sent to lab for analysis.      5 minutes for nursing intake (face to face time)   Sophia Oneill CMA          "

## 2017-02-09 DIAGNOSIS — C56.1 OVARIAN CANCER, RIGHT (H): Primary | ICD-10-CM

## 2017-02-09 DIAGNOSIS — C56.2 OVARIAN CANCER, LEFT (H): ICD-10-CM

## 2017-02-09 DIAGNOSIS — D64.9 ANEMIA, UNSPECIFIED TYPE: ICD-10-CM

## 2017-02-09 RX ORDER — FERROUS SULFATE 325(65) MG
325 TABLET ORAL
Qty: 90 TABLET | Refills: 3 | Status: SHIPPED | OUTPATIENT
Start: 2017-02-09 | End: 2017-06-06

## 2017-02-09 NOTE — TELEPHONE ENCOUNTER
Pharmacy/patient calling for refill of ferrous sulfate for the patient  Last visit with MD 1/25/17  Last order date     Disp Refills Start End JHON      ferrous sulfate (IRON) 325 (65 FE) MG tablet (Discontinued) 90 tablet 3 9/14/2016 2/9/2017 No     Sig: Take 1 tablet (325 mg) by mouth 3 times daily (with meals) Take with small amount of orange juice, do not take with calcium       Please advise on refills for patient

## 2017-02-17 ASSESSMENT — ENCOUNTER SYMPTOMS
RECTAL PAIN: 0
BLOATING: 0
TASTE DISTURBANCE: 0
SMELL DISTURBANCE: 0
PANIC: 0
SINUS PAIN: 0
RECTAL BLEEDING: 0
DEPRESSION: 0
BLOOD IN STOOL: 0
DIARRHEA: 1
NECK MASS: 0
INSOMNIA: 0
ABDOMINAL PAIN: 0
HEARTBURN: 0
TROUBLE SWALLOWING: 0
SORE THROAT: 0
NAUSEA: 0
DECREASED CONCENTRATION: 0
JAUNDICE: 0
BOWEL INCONTINENCE: 0
NERVOUS/ANXIOUS: 1
CONSTIPATION: 0
VOMITING: 0
SINUS CONGESTION: 0
HOARSE VOICE: 0

## 2017-02-22 ASSESSMENT — ENCOUNTER SYMPTOMS
INSOMNIA: 0
NERVOUS/ANXIOUS: 0

## 2017-02-23 ENCOUNTER — RESEARCH ENCOUNTER (OUTPATIENT)
Dept: ONCOLOGY | Facility: CLINIC | Age: 57
End: 2017-02-23

## 2017-02-23 ENCOUNTER — ONCOLOGY VISIT (OUTPATIENT)
Dept: ONCOLOGY | Facility: CLINIC | Age: 57
End: 2017-02-23
Attending: OBSTETRICS & GYNECOLOGY
Payer: COMMERCIAL

## 2017-02-23 ENCOUNTER — APPOINTMENT (OUTPATIENT)
Dept: LAB | Facility: CLINIC | Age: 57
End: 2017-02-23
Attending: OBSTETRICS & GYNECOLOGY
Payer: COMMERCIAL

## 2017-02-23 VITALS
OXYGEN SATURATION: 99 % | SYSTOLIC BLOOD PRESSURE: 134 MMHG | HEART RATE: 104 BPM | WEIGHT: 138.7 LBS | BODY MASS INDEX: 23.08 KG/M2 | DIASTOLIC BLOOD PRESSURE: 84 MMHG | TEMPERATURE: 98 F | RESPIRATION RATE: 18 BRPM

## 2017-02-23 DIAGNOSIS — C56.2 OVARIAN CANCER, LEFT (H): ICD-10-CM

## 2017-02-23 DIAGNOSIS — C56.1 OVARIAN CANCER, RIGHT (H): Primary | ICD-10-CM

## 2017-02-23 DIAGNOSIS — D70.2 DRUG-INDUCED NEUTROPENIA (H): ICD-10-CM

## 2017-02-23 LAB
ALBUMIN SERPL-MCNC: 3.6 G/DL (ref 3.4–5)
ALBUMIN UR-MCNC: NEGATIVE MG/DL
ALP SERPL-CCNC: 110 U/L (ref 40–150)
ALT SERPL W P-5'-P-CCNC: 23 U/L (ref 0–50)
AMYLASE SERPL-CCNC: 28 U/L (ref 30–110)
ANION GAP SERPL CALCULATED.3IONS-SCNC: 12 MMOL/L (ref 3–14)
APPEARANCE UR: CLEAR
AST SERPL W P-5'-P-CCNC: 20 U/L (ref 0–45)
BASOPHILS # BLD AUTO: 0 10E9/L (ref 0–0.2)
BASOPHILS NFR BLD AUTO: 0.7 %
BILIRUB SERPL-MCNC: 0.4 MG/DL (ref 0.2–1.3)
BILIRUB UR QL STRIP: NEGATIVE
BUN SERPL-MCNC: 17 MG/DL (ref 7–30)
CALCIUM SERPL-MCNC: 8.8 MG/DL (ref 8.5–10.1)
CANCER AG125 SERPL-ACNC: 132 U/ML (ref 0–30)
CHLORIDE SERPL-SCNC: 103 MMOL/L (ref 94–109)
CO2 SERPL-SCNC: 24 MMOL/L (ref 20–32)
COLOR UR AUTO: YELLOW
CREAT SERPL-MCNC: 0.77 MG/DL (ref 0.52–1.04)
DIFFERENTIAL METHOD BLD: ABNORMAL
EOSINOPHIL # BLD AUTO: 0.1 10E9/L (ref 0–0.7)
EOSINOPHIL NFR BLD AUTO: 1.6 %
ERYTHROCYTE [DISTWIDTH] IN BLOOD BY AUTOMATED COUNT: 13.4 % (ref 10–15)
GFR SERPL CREATININE-BSD FRML MDRD: 77 ML/MIN/1.7M2
GGT SERPL-CCNC: 29 U/L (ref 0–40)
GLUCOSE SERPL-MCNC: 94 MG/DL (ref 70–99)
GLUCOSE UR STRIP-MCNC: NEGATIVE MG/DL
HCT VFR BLD AUTO: 36.2 % (ref 35–47)
HGB BLD-MCNC: 12.4 G/DL (ref 11.7–15.7)
HGB UR QL STRIP: NEGATIVE
IMM GRANULOCYTES # BLD: 0 10E9/L (ref 0–0.4)
IMM GRANULOCYTES NFR BLD: 0.3 %
KETONES UR STRIP-MCNC: NEGATIVE MG/DL
LDH SERPL L TO P-CCNC: 154 U/L (ref 81–234)
LEUKOCYTE ESTERASE UR QL STRIP: NEGATIVE
LYMPHOCYTES # BLD AUTO: 2.2 10E9/L (ref 0.8–5.3)
LYMPHOCYTES NFR BLD AUTO: 37.9 %
MAGNESIUM SERPL-MCNC: 1.7 MG/DL (ref 1.6–2.3)
MCH RBC QN AUTO: 37 PG (ref 26.5–33)
MCHC RBC AUTO-ENTMCNC: 34.3 G/DL (ref 31.5–36.5)
MCV RBC AUTO: 108 FL (ref 78–100)
MONOCYTES # BLD AUTO: 0.7 10E9/L (ref 0–1.3)
MONOCYTES NFR BLD AUTO: 12.6 %
MUCOUS THREADS #/AREA URNS LPF: PRESENT /LPF
NEUTROPHILS # BLD AUTO: 2.7 10E9/L (ref 1.6–8.3)
NEUTROPHILS NFR BLD AUTO: 46.9 %
NITRATE UR QL: NEGATIVE
NRBC # BLD AUTO: 0 10*3/UL
NRBC BLD AUTO-RTO: 0 /100
PH UR STRIP: 5 PH (ref 5–7)
PLATELET # BLD AUTO: 223 10E9/L (ref 150–450)
POTASSIUM SERPL-SCNC: 3.4 MMOL/L (ref 3.4–5.3)
PROT SERPL-MCNC: 7.1 G/DL (ref 6.8–8.8)
RBC # BLD AUTO: 3.35 10E12/L (ref 3.8–5.2)
RBC #/AREA URNS AUTO: 0 /HPF (ref 0–2)
SODIUM SERPL-SCNC: 139 MMOL/L (ref 133–144)
SP GR UR STRIP: 1.01 (ref 1–1.03)
URN SPEC COLLECT METH UR: ABNORMAL
UROBILINOGEN UR STRIP-MCNC: 0 MG/DL (ref 0–2)
WBC # BLD AUTO: 5.7 10E9/L (ref 4–11)
WBC #/AREA URNS AUTO: <1 /HPF (ref 0–2)

## 2017-02-23 PROCEDURE — 82150 ASSAY OF AMYLASE: CPT | Performed by: OBSTETRICS & GYNECOLOGY

## 2017-02-23 PROCEDURE — 82977 ASSAY OF GGT: CPT | Performed by: OBSTETRICS & GYNECOLOGY

## 2017-02-23 PROCEDURE — 80053 COMPREHEN METABOLIC PANEL: CPT | Performed by: OBSTETRICS & GYNECOLOGY

## 2017-02-23 PROCEDURE — 25000128 H RX IP 250 OP 636: Mod: ZF | Performed by: OBSTETRICS & GYNECOLOGY

## 2017-02-23 PROCEDURE — 99212 OFFICE O/P EST SF 10 MIN: CPT

## 2017-02-23 PROCEDURE — 85025 COMPLETE CBC W/AUTO DIFF WBC: CPT | Performed by: OBSTETRICS & GYNECOLOGY

## 2017-02-23 PROCEDURE — 86304 IMMUNOASSAY TUMOR CA 125: CPT | Performed by: OBSTETRICS & GYNECOLOGY

## 2017-02-23 PROCEDURE — 99207 ZZC NON-BILLABLE SERV PER CHARTING: CPT | Mod: ZP | Performed by: OBSTETRICS & GYNECOLOGY

## 2017-02-23 PROCEDURE — 83735 ASSAY OF MAGNESIUM: CPT | Performed by: OBSTETRICS & GYNECOLOGY

## 2017-02-23 PROCEDURE — 99212 OFFICE O/P EST SF 10 MIN: CPT | Mod: ZF

## 2017-02-23 PROCEDURE — 83615 LACTATE (LD) (LDH) ENZYME: CPT | Performed by: OBSTETRICS & GYNECOLOGY

## 2017-02-23 PROCEDURE — 81001 URINALYSIS AUTO W/SCOPE: CPT | Performed by: OBSTETRICS & GYNECOLOGY

## 2017-02-23 PROCEDURE — 99214 OFFICE O/P EST MOD 30 MIN: CPT | Mod: GC | Performed by: OBSTETRICS & GYNECOLOGY

## 2017-02-23 PROCEDURE — 36591 DRAW BLOOD OFF VENOUS DEVICE: CPT

## 2017-02-23 RX ORDER — HEPARIN SODIUM (PORCINE) LOCK FLUSH IV SOLN 100 UNIT/ML 100 UNIT/ML
5 SOLUTION INTRAVENOUS DAILY PRN
Status: DISCONTINUED | OUTPATIENT
Start: 2017-02-23 | End: 2017-05-09 | Stop reason: HOSPADM

## 2017-02-23 RX ADMIN — SODIUM CHLORIDE, PRESERVATIVE FREE 5 ML: 5 INJECTION INTRAVENOUS at 12:24

## 2017-02-23 ASSESSMENT — PAIN SCALES - GENERAL: PAINLEVEL: NO PAIN (0)

## 2017-02-23 NOTE — NURSING NOTE
Chief Complaint   Patient presents with     Port Draw     port accessed by RN, labs collected and sent, line flushed with NS and heparin. Pt tolerated well, vitals taken and pt checked in for next appt.     Marilyn Weinberg

## 2017-02-23 NOTE — NURSING NOTE
Patient here for C10D1 visit for the Tesaro/Quadra Niraparib study.  Patient denies any issues or concerns at this time.  Patients lab were reviewed and ok to continue with study medication at current dose.  Patient returned 33 pills from bottle number [id number].  Pills returned match pills taken on diary.  Bottle number [id number] dispensed to patient along with new pill diary.  Next appointment made per protocol.

## 2017-02-23 NOTE — MR AVS SNAPSHOT
After Visit Summary   2/23/2017    Odalys Nathan    MRN: [id number]           Patient Information     Date Of Birth          1960        Visit Information        Provider Department      2/23/2017 12:40 PM Nga Yeung MD [facility]        Today's Diagnoses     Ovarian cancer, right (H)    -  1    Ovarian cancer, left (H)        Drug-induced neutropenia (H)           Follow-ups after your visit        Your next 10 appointments already scheduled     May 16, 2017 10:20 AM CDT   (Arrive by 10:05 AM)   CT CHEST/ABDOMEN/PELVIS W CONTRAST with UCCT2   [facility] CT ([facility])    [address]  [facility] [address] [phone]           Please bring any scans or X-rays taken at other hospitals, if similar tests were done. Also bring a list of your medicines, including vitamins, minerals and over-the-counter drugs. It is safest to leave personal items at home.  Be sure to tell your doctor:   If you have any allergies.   If there s any chance you are pregnant.   If you are breastfeeding.   If you have any special needs.  You may have contrast for this exam. To prepare:   Do not eat or drink for 2 hours before your exam. If you need to take medicine, you may take it with small sips of water. (We may ask you to take liquid medicine as well.)   The day before your exam, drink extra fluids at least six 8-ounce glasses (unless your doctor tells you to restrict your fluids).  Patients over 70 or patients with diabetes or kidney problems:   If you haven t had a blood test (creatinine test) within the last 30 days, go to your clinic or Diagnostic Imaging Department for this test.  If you have diabetes:   If your kidney function is normal, continue taking your metformin (Avandamet, Glucophage, Glucovance, Metaglip) on the day of your exam.   If your kidney function is abnormal, wait 48 hours before restarting this  medicine.  You will have oral contrast for this exam:   You will drink the contrast at home. Get this from your clinic or Diagnostic Imaging Department. Please follow the directions given.  Please wear loose clothing, such as a sweat suit or jogging clothes. Avoid snaps, zippers and other metal. We may ask you to undress and put on a hospital gown.  If you have any questions, please call the Imaging Department where you will have your exam.            May 18, 2017 12:00 PM CDT   [facility] Lab Draw with  [facility] nv LAB DRAW   [facility] ([facility])    [address] [place] [address] [phone]            May 18, 2017 12:40 PM CDT   (Arrive by 12:25 PM)   Return Active Treatment with Nga Yeung MD   [facility] ([facility])    [address] [place] [address] [phone]              Who to contact     If you have questions or need follow up information about today's clinic visit or your schedule please contact [facility] directly at [phone].  Normal or non-critical lab and imaging results will be communicated to you by MyChart, letter or phone within 4 business days after the clinic has received the results. If you do not hear from us within 7 days, please contact the clinic through [employer] or phone. If you have a critical or abnormal lab result, we will notify you by phone as soon as possible.  Submit refill requests through [employer] or call your pharmacy and they will forward the refill request to us. Please allow 3 business days for your refill to be completed.          Additional Information About Your Visit        [employer] Information     [employer] gives you secure access to your electronic health record. If you see a primary care provider, you can also send messages to your care team and make appointments. If you have questions, please call  your primary care clinic.  If you do not have a primary care provider, please call [phone] and they will assist you.        Care EveryWhere ID     This is your Care EveryWhere ID. This could be used by other organizations to access your [place] medical records  [id number]        Your Vitals Were     Pulse Temperature Respirations Pulse Oximetry BMI (Body Mass Index)       104 98  F (36.7  C) (Oral) 18 99% 23.08 kg/m2        Blood Pressure from Last 3 Encounters:   04/19/17 135/61   03/22/17 134/73   02/23/17 134/84    Weight from Last 3 Encounters:   04/19/17 64.8 kg (142 lb 12.8 oz)   03/22/17 64.1 kg (141 lb 4.8 oz)   02/23/17 62.9 kg (138 lb 11.2 oz)              We Performed the Following     Amylase          CBC with platelets differential     Comprehensive metabolic panel     GGT     Lactate Dehydrogenase     Magnesium     MD Instruction for Protocol     Nursing Communication 1     Routine UA with microscopic - No culture     Routine UA with microscopic     Treatment Conditions     Vital signs     Weigh patient          Today's Medication Changes          These changes are accurate as of: 2/23/17 11:59 PM.  If you have any questions, ask your nurse or doctor.               These medicines have changed or have updated prescriptions.        Dose/Directions    * study - niraparib 100 mg capsule   Commonly known as:  IDS# [id number]   This may have changed:  Another medication with the same name was added. Make sure you understand how and when to take each.   Used for:  Ovarian cancer, right (H), Ovarian cancer, left (H), Drug-induced neutropenia (H)   Changed by:  Patricia Rocha APRN CNP        Dose:  200 mg   Take 2 capsules (200 mg) by mouth every morning Take at the same time each day, preferably morning. Swallow whole and do NOT chew capsules. Food and water is permissible. First dose will be administered on site.   Quantity:  84 capsule   Refills:  0       * study - niraparib 100 mg capsule    Commonly known as:  IDS# [id number]   This may have changed:  You were already taking a medication with the same name, and this prescription was added. Make sure you understand how and when to take each.   Used for:  Ovarian cancer, right (H), Ovarian cancer, left (H), Drug-induced neutropenia (H)   Changed by:  Nga Yeung MD        Dose:  200 mg   Take 2 capsules (200 mg) by mouth every morning Take at the same time each day, preferably morning. Swallow whole and do NOT chew capsules. Food and water is permissible. First dose will be administered on site.   Quantity:  84 capsule   Refills:  0       * Notice:  This list has 2 medication(s) that are the same as other medications prescribed for you. Read the directions carefully, and ask your doctor or other care provider to review them with you.         Where to get your medicines      Some of these will need a paper prescription and others can be bought over the counter.  Ask your nurse if you have questions.     Bring a paper prescription for each of these medications     study - niraparib 100 mg capsule                Primary Care Provider Office Phone # Fax #    Aubrie Hester [phone]       [facility] [address] [facility] [address]        Thank you!     Thank you for choosing [facility]  for your care. Our goal is always to provide you with excellent care. Hearing back from our patients is one way we can continue to improve our services. Please take a few minutes to complete the written survey that you may receive in the mail after your visit with us. Thank you!             Your Updated Medication List - Protect others around you: Learn how to safely use, store and throw away your medicines at www.disposemymeds.org.          This list is accurate as of: 2/23/17 11:59 PM.  Always use your most recent med list.                   Brand Name Dispense Instructions for use    ASPIRIN PO      Take  325 mg by mouth daily       CALCIUM + D PO      Take 1 tablet by mouth daily.       cyanocobalamin 1000 MCG/ML injection    VITAMIN B12    1 mL    Inject 1 mL (1,000 mcg) into the muscle every 30 days       diphenoxylate-atropine 2.5-0.025 MG per tablet    LOMOTIL    60 tablet    Take 2 tablets by mouth 4 times daily as needed for diarrhea       ferrous sulfate 325 (65 FE) MG tablet    IRON    90 tablet    Take 1 tablet (325 mg) by mouth 3 times daily (with meals) Take with small amount of orange juice, do not take with calcium       HERBALS      daily       LEVOTHYROXINE SODIUM PO      Take by mouth daily       LORazepam 1 MG tablet    ATIVAN    30 tablet    Take 1 tablet (1 mg) by mouth every 6 hours as needed (Anxiety, Nausea/Vomiting or Sleep)       magnesium oxide 400 MG tablet    MAG-OX    90 tablet    Take 1 tablet (400 mg) by mouth 2 times daily       METFORMIN HCL PO      Take 500 mg by mouth daily       order for DME     3 each    Injection Supplies for Vitamin B12: 3cc syringes w/ 27 gauge needles, 1/2 inch length       potassium chloride 20 MEQ Packet    KLOR-CON     Take 20 mEq by mouth daily       * study - niraparib 100 mg capsule    IDS# [id number]    84 capsule    Take 2 capsules (200 mg) by mouth every morning Take at the same time each day, preferably morning. Swallow whole and do NOT chew capsules. Food and water is permissible. First dose will be administered on site.       * study - niraparib 100 mg capsule    IDS# [id number]    84 capsule    Take 2 capsules (200 mg) by mouth every morning Take at the same time each day, preferably morning. Swallow whole and do NOT chew capsules. Food and water is permissible. First dose will be administered on site.       VITAMIN C PO      Take 500 mg by mouth daily       VITAMIN D PO      Take 1,000 Units by mouth daily Unknown dose       VITAMIN E NATURAL PO      Take 100 Units by mouth daily       * Notice:  This list has 2 medication(s) that are the same as other  medications prescribed for you. Read the directions carefully, and ask your doctor or other care provider to review them with you.

## 2017-02-23 NOTE — LETTER
2017       RE: Odalys Nathan  [id number]  [facility] [address]     Dear Colleague,    Thank you for referring your patient, Odalys Nathan, to the [facility]. Please see a copy of my visit note below.      Review of Systems       Gynecologic Oncology Follow-Up Note  RE: Odalys Nathan  MRN: [id number]  : 1960  Date of Visit: 2017    CC: Odalys Nathan is a 56 year old year old female with recurrent stage IIIC bilateral ovarian cancer who presents today for follow up regarding disease management while on the TESARO niraparib trial.    HPI: Odalys comes to the clinic accompanied by her partner Marcos. She feeling well on niraparib. Pt denies n/v.  Patient continues to take ferrous sulfate TID with improved baseline diarrhea.  She just returned from vacation in [place] in  without any problems.       Brief Oncology History:  2012 - Admitted to hospital for 2 weeks of intermittent abdominal cramping, distention, diarrhea and N/V. CT of abdomen/pelvis significant for small bowel obstruction, a heterogenous soft tissue density in the pelvis, omental nodules, and ascites. Bilateral adnexal masses per U/S of pelvis. CA-125 was elevated at 987, CEA was normal at 1.0.    12 - Therapeutic paracentesis (4 L) with cytology confirming malignancy (MA positive, weak ER positive, CK-7 positive consistent with GYN primary). Surgery recommended d/t potential for falling blood counts 2/2 chemotherapy (patient is Nonden[employer]ational and limiting blood transfusion)    12 - Exploratory laparotomy, MAR BSO, lysis of adhesion, Appendectomy, Repair cystotomy, Omentectomy Qzsa-ftaxue-nqgiadtxi-transverse-colon resection, Ileo-descending colon anastomosis, CUSA, & Colonoscopy (done by Dr. Amato and colorectal team).  2012 - Admitted to hospital for bilateral pulmonary emboli and drainage of pleural effusion. Started on Lovenox.  12-3/21/12:  "Cycle #1-2 Carbo/Taxol IV  3/29/12 - Started on Keflex by her PCP for infection in her healing wound (immediately below her umbilicus).    4/11/12-6/14/12: Cycle #3-6 IV chemo, Cycle #1 IV/IP chemo  7/16/12 -  8, CT PRADEEP - enrolled in [facility] 212 - observation arm  3/4/13-6/28/13:  6, 9, 12, 15, 20.  Decision made to start Doxil/Carbo.  7/11/13: The left ventricular ejection fraction is normal at 66.4%.  7/12/13-9/6/13: Cycle #1-3 Doxil/Carbo.  10, 11.    2/20/14: Decision to take break from chemo for two months, followed by CT and CA-125.    4/21/14  27  exploratory laparotomy and removal of mesenteric masses, tumor debulking, peritoneal biopsies and intraperitoneal port placement. On laparoscopy, it was noted that there were small nodules on the anterior abdominal wall near the previous incision, small nodules on the right pelvic sidewall as well. Nodules were palpated in the mesentery; however, as it was unable to clarify where the origin of the nodules was, the decision was made to open the patient. On opening there was found to be approximately a 3 cm nodule in the small bowel mesentery and another separate approximately 2 cm nodule in the bowel mesentery. Pelvis without evidence of cancer, some mesenteric lymph nodes were palpated. No evidence otherwise of any disseminated cancer throughout the abdomen.    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 (2/2)  -Largest metastasis: 1.3 cm  -See comment  D: Peritoneum, right paracolic gutter #2, biopsy:  -Fibroadipose tissue with granulomatous inflammation surrounding refractile material  -Negative for " "malignancy   E: Small bowel adhesion, biopsy:  -Fibroconnective tissue, consistent with adhesion  -Negative for malignancy  F: Lymph nodes, mesentry, not otherwise specified, excision:  -Two lymph nodes, negative for metastatic carcinoma (0/2)  G: Lymph node, mesentery, \"#2\", excision:  -One lymph node, negative for metastatic carcinoma (0/1)  H: Lymph nodes, mesentery, \"nodule #2\", excision:  -Five lymph nodes, negative for metastatic carcinoma (0/5)  COMMENT:  Some of the specimens show post-operative changes. Others show possible treatment related changes, including necrosis. The metastatic carcinoma in the mesenteric lymph nodes (specimen C) shows variable morphology, including relatively low grade tumor with papillary architecture, and high grade tumor comprised of nests of tumor cells with irregular, slit-like spaces and marked nuclear pleomorphism.    5/29/14: Cycle 1 IV PACLitaxel / IP CISplatin / IP PACLitaxel.  - 28.  6/26/14: Cycle #2 IV/IP.  10  8/5/14: CT chest/abd/pelvis IMPRESSION     1. In this patient with ovarian cancer, overall findings are indicative of stable/slight improvement, as multiple mesenteric lymphadenopathy and scattered nodular peritoneal soft tissue mass lesions appear unchanged or slightly smaller since 4/21/2014.    2. 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 Mild dilatation of the second and the third portion of the duodenum with a narrow SMA angle. This could represent SMA syndrome, if clinically correlated.17/14:    Cycle #3 IV/IP.  10.    8/7/14: Cycle #4 Taxol/Carbo (changed from IV/IP).  10. She has been feeling okay. She is unsure if she can finish out the course of 6 cycles IV/IP taxol/cisplatin. She feels like she has the flu for about a week then starts feeling gradually better after each chemo cycle. Her spouse notes that she actually has been more sick with the treatments than she initially admits here. She was also previously " having some rib pain. Denies any rib pain now. Denies any chest pain or shortness of breath.  8/20/14: Remove Intraperitoneal Port ( Port and catheter intact - discarded)  8/28/14: Cycle #5 Taxol/Carbo held due to thrombocytopenia.  6.    9/29/14: Cycle #6 Taxol/Carbo  6. Insurance questions regarding GSF coverage today. No concerns other than fatigue. Taking iron for anemia and does not desire blood transfusion. Using neulasta for neutropenia. Using home IV hydration if needed. Baseline unchanged. No abdominal bloating, constipation, diarrhea, pain, vaginal or rectal bleeding, cough or dyspnea, fluid retention.     10/20/14:  5. CT chest/abdomen/pelvis on 10/16/14 showed nodular peritoneal soft tissue mass in the right lower quadrant adjacent to the psoas muscle is no longer appreciated. Adjacent prominent lymphadenopathy is unchanged from previous exam. 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  1/27/15:  6.  4/28/15:  14.  5/26/15:  18.    6/2/15: CT cap Impression:  1. Postsurgical changes of hysterectomy and bilateral salpingo-oophorectomy for ovarian cancer. There is a new 8 mm hazy, ill-defined hypoattenuating lesion in hepatic segment 6 which is suspicious for a metastatic deposit. Further evaluation with ultrasound in recommended.    2. Increased size of a left retroperitoneal lymph node which is indeterminate but may represent a ciro metastasis. Mildly prominent lymph nodes in the right lower quadrant are not significantly changed.  3. Moderate colonic stool burden.    6/4/15: US abdomen IMPRESSION:    Hyperechoic lesion in the right hepatic lobe, consistent with hemangioma. This does not corresponds to the area of the lesion seen on CT from 6/2/2015. An MR would be helpful for identifying and characterizing the lesion from the recent CT.  6/12/15: MR abd IMPRESSION:  1. New 20 x 11 mm enhancing lesions between the right  obliques, concerning for metastatic disease. This lesions should be amenable to percutaneous biopsy, if indicated.  2. Correlating to the lesion visualized on comparison CT is a hepatic segment 6 subcapsular 7 mm lesion. Overall the appearance favors the diagnosis of a simple cyst. However, there is faint suggestion of mild peripheral arterial enhancement. Although this is favored as  artifactual, this should be followed up to confirm stability. Recommend 6 month followup.  3. Hepatic segment 6, 5 mm lesion too small to technically characterize. Differential would favor FNH, less likely flash filling hemangioma. Recommend attention on followup.      9/1/15: Cycle #1 Avastin/Cytoxan.   31.  9/24/15: Cycle #2 Avastin/Cytoxan.  19.  10/15/15: Cycle #3 Avastin/Cytoxan.  16.     11/6/15: CT c/a/p IMPRESSION:    1. Stable postoperative change of MAR/BSO for ovarian cancer.  2. The lesion in the right flank abdominal musculature is slightly decreased in size. Otherwise, stable examination.  2. No evidence of metastatic disease in the chest.    11/20/15:  16    11/25/15: surgical pathology report  FINAL DIAGNOSIS:  Soft tissue, right oblique muscle mass, excision:  -Recurrent ovarian serous carcinoma  -Carcinoma is present less than 1 mm from one resection margin  -Background skeletal muscle and fibroadipose tissue    1/4/16:  22  1/11/16-1/27/16: Radiation to right flank x 12 treatments    4/7/2016:  94. CT cap IMPRESSION:    In this patient with ovarian cancer status post MAR/BSO and descending/transverse colectomy:  1. No evidence for malignancy in the chest, abdomen, or pelvis.  2. Stable small hypodense segment 6 liver lesion, appears more likely benign, possibly a cyst.    5/13/16:  124.    6/3/16: PET CT IMPRESSION: In this patient with a history of ovarian cancer:  1. Hypermetabolic and enlarging periaortic and perihepatic lymphadenopathy compatible with metastatic disease,  as detailed above.  2. Although hypodense lesion in hepatic segment 6 has been present since 6/2/2015 associated hypermetabolism makes this lesion highly concerning for metastatic disease.    6/9/16:  137.  6/14/16: Cycle #1 Niraparib.    6/28/16: Cycle #1 D15 Niraparib.    7/5/16:  100.    7/11/16: Cycle #2 Niraparib.   83.    8/3/2016: PET CT IMPRESSION:    In this patient with known history of ovarian cancer:  1) New pleural based nodular opacities in the lateral and inferior aspects of the bilateral lower lobes, worse in the left lung. Likely infection. Close follow up is recommended.      2) Slight decrease in hypermetabolic abdominal lymphadenopathy. 2 hypermetabolic lymph nodes persist.  3) Unchanged right hepatic lobe metastatic lesion.     8/9/16: Cycle #3 Niraparib.  69.  9/6/16: Cycle #4 Niraparib.  53. Dose held due to anemia.  9/13/16: Eval for potential cycle 4 niraparib. Dose held due to anemia.    10/4/16: CT CAP impression:   In this patient with a known history of ovarian cancer:  1. There has been interval resolution of pleural-based nodular  opacities which likely represented infection.  2. Abdominal lymphadenopathy in the form of 2 portacaval lymph nodes  have not significantly changed in size, noted to be hypermetabolic on  prior PET/CT.  3. Previously demonstrated metastatic lesion in the right lobe of the  liver is not significantly changed.  10/11/16:  60  11/1/16: C6 niraparib,  75  11/29/16: C7 niraparib.  78. CT CAP impression as follows:  Target lesions (RECIST criteria):    A previously described target lesion superior to the head of the  pancreas (series 2, image 64)  (referred to as a perihepatic node on  6/3/2016) may not be a valid target lesion because it measured less  than 1.5 cm originally. However, this particular node has decreased in  size, now measuring 7 mm in short axis versus 14 mm on 6/3/2016 when  measured in a similar  fashion.    2.3 cm short axis portacaval lymph node on series 2 image 67,  previously 2.0 cm on 10/4/2016.    1.2 cm subtle hypodensity in hepatic segment 6 on series 2 image 75,  stable on multiple studies since at least 6/3/2016  Sum of diameters today: 3.5 cm. Sum of diameters 10/4/2016: 3.2 cm.  Growth = 9%.      12/27/16: C8 niraparib.  105.  1/25/17: C9 niraparib.  108    2/20/17: CT CAP Impression   IMPRESSION:   1. In this patient with history of ovarian cancer there is stable  disease by RECIST criteria as evidenced by:    1a. Mildly increased size of liver metastasis.   1b. Stable portacaval lymphadenopathy.   1c. No evidence of metastatic disease in the chest.  2. Trace emphysematous changes of the lungs.    2/23/17:  pending, C10 niraparib      Past Medical History   Diagnosis Date     Antiplatelet or antithrombotic long-term use      Ascites      Blood clot in the legs      Diabetes (H)      Ovarian cancer (H)      serous,stg IV     Pleural effusion      Pulmonary embolism (H) 2/2012     Refusal of blood transfusions as patient is Yarsani      Short gut syndrome      Subclinical hypothyroidism 4/18/2013     Thrombosis of leg        Past Surgical History   Procedure Laterality Date     Vascular surgery       stent left iliac vein     Hysterectomy total abd, zak salpingo-oophorectomy, node dissection, tumor debulking, combined  2/1/2012     Procedure:COMBINED HYSTERECTOMY TOTAL ABDOMINAL, BILATERAL SALPINGO-OOPHORECTOMY, NODE DISSECTION, TUMOR DEBULKING;  Exploratory Laparotomy, Total Abdominal Hysterectomy, Bilateral Salpingo-Oophorectomy, appendectomy,lysis of adhesions, ileal, ascending, transverse and splenic flexure resection, ileal descending bowel renanastomosis, incidental cystotomy repair, CUSA procedure and colonoscopy ; [facility]     Colonoscopy  2/1/2012     Procedure:COLONOSCOPY; With Biopsy; Surgeon:TONI SULLIVAN; Location:[facility] OR     Insert port vascular access        Insert port peritoneal access  4/3/2012     Procedure:INSERT PORT PERITONEAL ACCESS; Intraperitoneal Port Placement (c-arm); Surgeon:SAMUEL CARRASCO; Location:[facility] OR     Colectomy       Laparoscopy diagnostic (gyn)  5/14/2014     Procedure: LAPAROSCOPY DIAGNOSTIC (GYN);  Surgeon: Nga Yeung MD;  Location: [facility] OR     Laparotomy, tumor debulking, combined  5/14/2014     Procedure: COMBINED LAPAROTOMY, TUMOR DEBULKING;  Surgeon: Nga Yeung MD;  Location: [facility] OR     Insert port peritoneal access  5/14/2014     Procedure: INSERT PORT PERITONEAL ACCESS;  Surgeon: Nga Yeung MD;  Location: [facility] OR     Remove catheter peritoneal N/A 8/20/2014     Procedure: REMOVE CATHETER PERITONEAL;  Surgeon: Nga Yeung MD;  Location: [facility] OR     Laparotomy exploratory Right 11/25/2015     Procedure: LAPAROTOMY EXPLORATORY;  Surgeon: Nga Yeung MD;  Location: [facility] OR       Current Outpatient Prescriptions   Medication     ferrous sulfate (IRON) 325 (65 FE) MG tablet     study - niraparib (IDS# 4759) 100 mg capsule     study - niraparib (IDS# 4759) 100 mg capsule     study - niraparib (IDS# 4759) 100 mg capsule     LEVOTHYROXINE SODIUM PO     iohexol (OMNIPAQUE) 140 MG/ML SOLN     order for DME     LORazepam (ATIVAN) 1 MG tablet     METFORMIN HCL PO     diphenoxylate-atropine (LOMOTIL) 2.5-0.025 MG per tablet     cyanocobalamin (VITAMIN B12) 1000 MCG/ML injection     ORDER FOR DME     magnesium oxide (MAG-OX) 400 MG tablet     ASPIRIN PO     potassium chloride (KLOR-CON) 20 MEQ packet     VITAMIN E NATURAL PO     Cholecalciferol (VITAMIN D PO)     HERBALS     Ascorbic Acid (VITAMIN C PO)     Calcium Carbonate-Vitamin D (CALCIUM + D PO)     Current Facility-Administered Medications   Medication     heparin 100 UNIT/ML injection 5 mL     sodium chloride (PF) 0.9% PF flush 20 mL     Facility-Administered Medications Ordered in Other Visits   Medication     heparin 100 UNIT/ML injection 500 Units           Allergies   Allergen Reactions     Nkda [No Known Drug Allergies]        Family History   Problem Relation Age of Onset     CANCER Mother 69     lung, smoker     CANCER Maternal Uncle 65     brain     Colon Cancer Maternal Aunt 80     colon       Social History     Social History     Marital status:      Spouse name: N/A     Number of children: N/A     Years of education: N/A     Occupational History     Not on file.     Social History Main Topics     Smoking status: Former Smoker     Years: 8.00     Types: Cigarettes     Quit date: 6/21/1980     Smokeless tobacco: Never Used      Comment: started at 9 yo and quit at 16 yo     Alcohol use Yes      Comment: 3x/day wine or jodi     Drug use: No     Sexual activity: Not on file     Other Topics Concern     Not on file     Social History Narrative       Review of Systems     Constitutional:  Negative for fever, chills, weight loss, weight gain, fatigue, decreased appetite, night sweats, recent stressors, height gain, height loss, post-operative complications, incisional pain, hallucinations, increased energy, hyperactivity and confused.   HENT:  Positive for tinnitus. Negative for ear pain, hearing loss, nosebleeds, trouble swallowing, hoarse voice, mouth sores, sore throat, ear discharge, tooth pain, gum tenderness, taste disturbance, smell disturbance, hearing aid, bleeding gums, dry mouth, sinus pain, sinus congestion and neck mass.    Eyes:  Negative for double vision, pain, redness, eye pain, decreased vision, eye watering, eye bulging, eye dryness, flashing lights, spots, floaters, strabismus, tunnel vision, jaundice and eye irritation.   Respiratory:   Negative for cough, hemoptysis, sputum production, shortness of breath, wheezing, sleep disturbances due to breathing, snores loudly, respiratory pain, dyspnea on exertion, cough disturbing sleep and postural dyspnea.    Cardiovascular:  Negative for chest pain, dyspnea on exertion, palpitations,  orthopnea, claudication, leg swelling, fingers/toes turn blue, hypertension, hypotension, syncope, history of heart murmur, chest pain on exertion, chest pain at rest, pacemaker, few scattered varicosities, leg pain, sleep disturbances due to breathing, tachycardia, light-headedness, exercise intolerance and edema.   Gastrointestinal:  Positive for diarrhea. Negative for heartburn, nausea, vomiting, abdominal pain, constipation, blood in stool, melena, rectal pain, bloating, hemorrhoids, bowel incontinence, jaundice, rectal bleeding, coffee ground emesis and change in stool.   Genitourinary:  Negative for bladder incontinence, dysuria, urgency, hematuria, flank pain, vaginal discharge, difficulty urinating, genital sores, dyspareunia, decreased libido, nocturia, voiding less frequently, arousal difficulty, abnormal vaginal bleeding, excessive menstruation, menstrual changes, hot flashes, vaginal dryness and postmenopausal bleeding.   Musculoskeletal:  Negative for myalgias, back pain, joint swelling, arthralgias, stiffness, muscle cramps, neck pain, bone pain, muscle weakness and fracture.   Skin:  Negative for nail changes, itching, poor wound healing, rash, hair changes, skin changes, acne, warts, poor wound healing, scarring, flaky skin, Raynaud's phenomenon, sensitivity to sunlight and skin thickening.   Neurological:  Negative for dizziness, tingling, tremors, speech change, seizures, loss of consciousness, weakness, light-headedness, numbness, headaches, disturbances in coordination, extremity numbness, memory loss, difficulty walking and paralysis.   Endo/Heme:  Negative for anemia, swollen glands and bruises/bleeds easily.   Psychiatric/Behavioral:  Negative for depression, hallucinations, memory loss, decreased concentration, mood swings and panic attacks.    Breast:  Negative for breast discharge, breast mass, breast pain and nipple retraction.   Endocrine:  Negative for altered temperature regulation,  polyphagia, polydipsia, unwanted hair growth and change in facial hair.        Physical Exam:    /84  Pulse 104  Temp 98  F (36.7  C) (Oral)  Resp 18  Wt 62.9 kg (138 lb 11.2 oz)  SpO2 99%  BMI 23.08 kg/m2  ECOG score-0    General: Alert non-toxic appearing female in no acute distress  HEENT: Normocephalic, atraumatic; PERRLA; no scleral icterus; oropharynx pink without lesions; neck supple without lymphadenopathy  Pulmonary: Lungs clear to auscultation, no increased work of breathing noted  Cardiac: Tachycardic, regular rhythm, S1S2, no clicks, murmurs, rubs, or gallops; bilateral lower extremities without edema, dorsalis pedis pulses 2+ bilaterally  GI: Normoactive bowel sounds x4 quadrants, abdomen soft, non-distended, and non-tender to palpation without masses or organomegaly  : Not indicated  Heme: Cervical, supraclavicular, and inguinal nodes without lymphadenopathy  MSK: Moves all extremities, no obvious muscle wasting  Neuro: No gross deficits, normal gait  Skin: Appropriate color for race, warm and dry, no rashes or lesions to unclothed skin  Psych: Pleasant and interactive, affect bright, makes appropriate eye contact, thought process linear    Labs:   Results for MAURY MERA (MRN [id number]) as of 5/9/2017 10:26   Ref. Range 2/23/2017 12:34   Sodium Latest Ref Range: 133 - 144 mmol/L 139   Potassium Latest Ref Range: 3.4 - 5.3 mmol/L 3.4   Chloride Latest Ref Range: 94 - 109 mmol/L 103   Carbon Dioxide Latest Ref Range: 20 - 32 mmol/L 24   Urea Nitrogen Latest Ref Range: 7 - 30 mg/dL 17   Creatinine Latest Ref Range: 0.52 - 1.04 mg/dL 0.77   GFR Estimate Latest Ref Range: >60 mL/min/1.7m2 77   GFR Estimate If Black Latest Ref Range: >60 mL/min/1.7m2 >90...   Calcium Latest Ref Range: 8.5 - 10.1 mg/dL 8.8   Anion Gap Latest Ref Range: 3 - 14 mmol/L 12   Magnesium Latest Ref Range: 1.6 - 2.3 mg/dL 1.7   Albumin Latest Ref Range: 3.4 - 5.0 g/dL 3.6   Protein Total Latest Ref Range: 6.8 - 8.8  g/dL 7.1   Bilirubin Total Latest Ref Range: 0.2 - 1.3 mg/dL 0.4   Alkaline Phosphatase Latest Ref Range: 40 - 150 U/L 110   ALT Latest Ref Range: 0 - 50 U/L 23   AST Latest Ref Range: 0 - 45 U/L 20   Amylase Latest Ref Range: 30 - 110 U/L 28 (L)    Latest Ref Range: 0 - 30 U/mL 132 (H)   GGT Latest Ref Range: 0 - 40 U/L 29   Lactate Dehydrogenase Latest Ref Range: 81 - 234 U/L 154   Glucose Latest Ref Range: 70 - 99 mg/dL 94   WBC Latest Ref Range: 4.0 - 11.0 10e9/L 5.7   Hemoglobin Latest Ref Range: 11.7 - 15.7 g/dL 12.4   Hematocrit Latest Ref Range: 35.0 - 47.0 % 36.2   Platelet Count Latest Ref Range: 150 - 450 10e9/L 223   RBC Count Latest Ref Range: 3.8 - 5.2 10e12/L 3.35 (L)   MCV Latest Ref Range: 78 - 100 fl 108 (H)   MCH Latest Ref Range: 26.5 - 33.0 pg 37.0 (H)   MCHC Latest Ref Range: 31.5 - 36.5 g/dL 34.3   RDW Latest Ref Range: 10.0 - 15.0 % 13.4   Diff Method Unknown Automated Method   % Neutrophils Latest Units: % 46.9   % Lymphocytes Latest Units: % 37.9   % Monocytes Latest Units: % 12.6   % Eosinophils Latest Units: % 1.6   % Basophils Latest Units: % 0.7   % Immature Granulocytes Latest Units: % 0.3   Nucleated RBCs Latest Ref Range: 0 /100 0   Absolute Neutrophil Latest Ref Range: 1.6 - 8.3 10e9/L 2.7   Absolute Lymphocytes Latest Ref Range: 0.8 - 5.3 10e9/L 2.2   Absolute Monocytes Latest Ref Range: 0.0 - 1.3 10e9/L 0.7   Absolute Eosinophils Latest Ref Range: 0.0 - 0.7 10e9/L 0.1   Absolute Basophils Latest Ref Range: 0.0 - 0.2 10e9/L 0.0   Abs Immature Granulocytes Latest Ref Range: 0 - 0.4 10e9/L 0.0   Absolute Nucleated RBC Unknown 0.0     UA pending   pending    Assessment/Plan:  1) Recurrent stage IIIC bilateral ovarian cancer: Patient tolerating without dose limiting side effects. Proceed with cycle 10 niraparib. 2/20/17 CT CAP with RECIST shows stable disease with mildly increased size of liver metastasis. Reviewed signs and symptoms for when she should contact the clinic  or seek additional care, including but not limited to fever, chills, inability to keep down food or fluids, nausea and vomiting not controlled with antiemetics, and diarrhea leading to dehydration. Patient to contact the clinic with any questions or concerns in the interim. Betzy Jones RN in for teaching and study follow up.    2) Genetic counseling: Testing has been performed and she is negative for mutations in BRCA1, BRCA2, EPCAM, MLH1, MSH2, MSH6, PMS2, PTEN, and TP53 genes    3) Health maintenance issues discussed include to continue following up with PCP for non-gynecologic concerns.    4) Patient verbalized understanding of and agreement with plan    A total of 20 minutes spent with patient, over 50% spent in counseling and coordination of care.    Rylie Rojas MD  Gynecologic Oncology Fellow    Nga Yeung MD    Department of Ob/Gyn and Women's Health  Division of Gynecologic Oncology  [facility]  [phone]      I saw and evaluated the patient with the fellow. I edited and reviewed the above note.

## 2017-02-23 NOTE — PROGRESS NOTES
Review of Systems       Gynecologic Oncology Follow-Up Note  RE: Odalys Nathan  MRN: [id number]  : 1960  Date of Visit: 2017    CC: Odalys Nathan is a 56 year old year old female with recurrent stage IIIC bilateral ovarian cancer who presents today for follow up regarding disease management while on the TESARO niraparib trial.    HPI: Odalys comes to the clinic accompanied by her partner Marcos. She feeling well on niraparib. Pt denies n/v.  Patient continues to take ferrous sulfate TID with improved baseline diarrhea.  She just returned from vacation in [place] in  without any problems.       Brief Oncology History:  2012 - Admitted to hospital for 2 weeks of intermittent abdominal cramping, distention, diarrhea and N/V. CT of abdomen/pelvis significant for small bowel obstruction, a heterogenous soft tissue density in the pelvis, omental nodules, and ascites. Bilateral adnexal masses per U/S of pelvis. CA-125 was elevated at 987, CEA was normal at 1.0.    12 - Therapeutic paracentesis (4 L) with cytology confirming malignancy (MS positive, weak ER positive, CK-7 positive consistent with GYN primary). Surgery recommended d/t potential for falling blood counts 2/2 chemotherapy (patient is Uatsdin and limiting blood transfusion)    12 - Exploratory laparotomy, MAR BSO, lysis of adhesion, Appendectomy, Repair cystotomy, Omentectomy Jdej-szngrf-znxjmrnvc-transverse-colon resection, Ileo-descending colon anastomosis, CUSA, & Colonoscopy (done by Dr. Amato and colorectal team).  2012 - Admitted to hospital for bilateral pulmonary emboli and drainage of pleural effusion. Started on Lovenox.  12-3/21/12: Cycle #1-2 Carbo/Taxol IV  3/29/12 - Started on Keflex by her PCP for infection in her healing wound (immediately below her umbilicus).    12-12: Cycle #3-6 IV chemo, Cycle #1 IV/IP chemo  12 -  8, CT PRADEEP - enrolled in  -  "observation arm  3/4/13-6/28/13:  6, 9, 12, 15, 20.  Decision made to start Doxil/Carbo.  7/11/13: The left ventricular ejection fraction is normal at 66.4%.  7/12/13-9/6/13: Cycle #1-3 Doxil/Carbo.  10, 11.    2/20/14: Decision to take break from chemo for two months, followed by CT and CA-125.    4/21/14  27  exploratory laparotomy and removal of mesenteric masses, tumor debulking, peritoneal biopsies and intraperitoneal port placement. On laparoscopy, it was noted that there were small nodules on the anterior abdominal wall near the previous incision, small nodules on the right pelvic sidewall as well. Nodules were palpated in the mesentery; however, as it was unable to clarify where the origin of the nodules was, the decision was made to open the patient. On opening there was found to be approximately a 3 cm nodule in the small bowel mesentery and another separate approximately 2 cm nodule in the bowel mesentery. Pelvis without evidence of cancer, some mesenteric lymph nodes were palpated. No evidence otherwise of any disseminated cancer throughout the abdomen.    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 (2/2)  -Largest metastasis: 1.3 cm  -See comment  D: Peritoneum, right paracolic gutter #2, biopsy:  -Fibroadipose tissue with granulomatous inflammation surrounding refractile material  -Negative for malignancy   E: Small bowel adhesion, biopsy:  -Fibroconnective tissue, consistent with adhesion  -Negative for malignancy  F: Lymph nodes, mesentry, not otherwise specified, excision:  -Two lymph nodes, negative for metastatic carcinoma (0/2)  G: " "Lymph node, mesentery, \"#2\", excision:  -One lymph node, negative for metastatic carcinoma (0/1)  H: Lymph nodes, mesentery, \"nodule #2\", excision:  -Five lymph nodes, negative for metastatic carcinoma (0/5)  COMMENT:  Some of the specimens show post-operative changes. Others show possible treatment related changes, including necrosis. The metastatic carcinoma in the mesenteric lymph nodes (specimen C) shows variable morphology, including relatively low grade tumor with papillary architecture, and high grade tumor comprised of nests of tumor cells with irregular, slit-like spaces and marked nuclear pleomorphism.    5/29/14: Cycle 1 IV PACLitaxel / IP CISplatin / IP PACLitaxel.  - 28.  6/26/14: Cycle #2 IV/IP.  10  8/5/14: CT chest/abd/pelvis IMPRESSION     1. In this patient with ovarian cancer, overall findings are indicative of stable/slight improvement, as multiple mesenteric lymphadenopathy and scattered nodular peritoneal soft tissue mass lesions appear unchanged or slightly smaller since 4/21/2014.    2. 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 Mild dilatation of the second and the third portion of the duodenum with a narrow SMA angle. This could represent SMA syndrome, if clinically correlated.17/14:    Cycle #3 IV/IP.  10.    8/7/14: Cycle #4 Taxol/Carbo (changed from IV/IP).  10. She has been feeling okay. She is unsure if she can finish out the course of 6 cycles IV/IP taxol/cisplatin. She feels like she has the flu for about a week then starts feeling gradually better after each chemo cycle. Her spouse notes that she actually has been more sick with the treatments than she initially admits here. She was also previously having some rib pain. Denies any rib pain now. Denies any chest pain or shortness of breath.  8/20/14: Remove Intraperitoneal Port ( Port and catheter intact - discarded)  8/28/14: Cycle #5 Taxol/Carbo held due to thrombocytopenia.  6. "    9/29/14: Cycle #6 Taxol/Carbo  6. Insurance questions regarding GSF coverage today. No concerns other than fatigue. Taking iron for anemia and does not desire blood transfusion. Using neulasta for neutropenia. Using home IV hydration if needed. Baseline unchanged. No abdominal bloating, constipation, diarrhea, pain, vaginal or rectal bleeding, cough or dyspnea, fluid retention.     10/20/14:  5. CT chest/abdomen/pelvis on 10/16/14 showed nodular peritoneal soft tissue mass in the right lower quadrant adjacent to the psoas muscle is no longer appreciated. Adjacent prominent lymphadenopathy is unchanged from previous exam. 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  1/27/15:  6.  4/28/15:  14.  5/26/15:  18.    6/2/15: CT cap Impression:  1. Postsurgical changes of hysterectomy and bilateral salpingo-oophorectomy for ovarian cancer. There is a new 8 mm hazy, ill-defined hypoattenuating lesion in hepatic segment 6 which is suspicious for a metastatic deposit. Further evaluation with ultrasound in recommended.    2. Increased size of a left retroperitoneal lymph node which is indeterminate but may represent a ciro metastasis. Mildly prominent lymph nodes in the right lower quadrant are not significantly changed.  3. Moderate colonic stool burden.    6/4/15: US abdomen IMPRESSION:    Hyperechoic lesion in the right hepatic lobe, consistent with hemangioma. This does not corresponds to the area of the lesion seen on CT from 6/2/2015. An MR would be helpful for identifying and characterizing the lesion from the recent CT.  6/12/15: MR abd IMPRESSION:  1. New 20 x 11 mm enhancing lesions between the right obliques, concerning for metastatic disease. This lesions should be amenable to percutaneous biopsy, if indicated.  2. Correlating to the lesion visualized on comparison CT is a hepatic segment 6 subcapsular 7 mm lesion. Overall the appearance  favors the diagnosis of a simple cyst. However, there is faint suggestion of mild peripheral arterial enhancement. Although this is favored as  artifactual, this should be followed up to confirm stability. Recommend 6 month followup.  3. Hepatic segment 6, 5 mm lesion too small to technically characterize. Differential would favor FNH, less likely flash filling hemangioma. Recommend attention on followup.      9/1/15: Cycle #1 Avastin/Cytoxan.   31.  9/24/15: Cycle #2 Avastin/Cytoxan.  19.  10/15/15: Cycle #3 Avastin/Cytoxan.  16.     11/6/15: CT c/a/p IMPRESSION:    1. Stable postoperative change of MAR/BSO for ovarian cancer.  2. The lesion in the right flank abdominal musculature is slightly decreased in size. Otherwise, stable examination.  2. No evidence of metastatic disease in the chest.    11/20/15:  16    11/25/15: surgical pathology report  FINAL DIAGNOSIS:  Soft tissue, right oblique muscle mass, excision:  -Recurrent ovarian serous carcinoma  -Carcinoma is present less than 1 mm from one resection margin  -Background skeletal muscle and fibroadipose tissue    1/4/16:  22  1/11/16-1/27/16: Radiation to right flank x 12 treatments    4/7/2016:  94. CT cap IMPRESSION:    In this patient with ovarian cancer status post MAR/BSO and descending/transverse colectomy:  1. No evidence for malignancy in the chest, abdomen, or pelvis.  2. Stable small hypodense segment 6 liver lesion, appears more likely benign, possibly a cyst.    5/13/16:  124.    6/3/16: PET CT IMPRESSION: In this patient with a history of ovarian cancer:  1. Hypermetabolic and enlarging periaortic and perihepatic lymphadenopathy compatible with metastatic disease, as detailed above.  2. Although hypodense lesion in hepatic segment 6 has been present since 6/2/2015 associated hypermetabolism makes this lesion highly concerning for metastatic disease.    6/9/16:  137.  6/14/16: Cycle #1 Niraparib.     6/28/16: Cycle #1 D15 Niraparib.    7/5/16:  100.    7/11/16: Cycle #2 Niraparib.   83.    8/3/2016: PET CT IMPRESSION:    In this patient with known history of ovarian cancer:  1) New pleural based nodular opacities in the lateral and inferior aspects of the bilateral lower lobes, worse in the left lung. Likely infection. Close follow up is recommended.      2) Slight decrease in hypermetabolic abdominal lymphadenopathy. 2 hypermetabolic lymph nodes persist.  3) Unchanged right hepatic lobe metastatic lesion.     8/9/16: Cycle #3 Niraparib.  69.  9/6/16: Cycle #4 Niraparib.  53. Dose held due to anemia.  9/13/16: Eval for potential cycle 4 niraparib. Dose held due to anemia.    10/4/16: CT CAP impression:   In this patient with a known history of ovarian cancer:  1. There has been interval resolution of pleural-based nodular  opacities which likely represented infection.  2. Abdominal lymphadenopathy in the form of 2 portacaval lymph nodes  have not significantly changed in size, noted to be hypermetabolic on  prior PET/CT.  3. Previously demonstrated metastatic lesion in the right lobe of the  liver is not significantly changed.  10/11/16:  60  11/1/16: C6 niraparib,  75  11/29/16: C7 niraparib.  78. CT CAP impression as follows:  Target lesions (RECIST criteria):    A previously described target lesion superior to the head of the  pancreas (series 2, image 64)  (referred to as a perihepatic node on  6/3/2016) may not be a valid target lesion because it measured less  than 1.5 cm originally. However, this particular node has decreased in  size, now measuring 7 mm in short axis versus 14 mm on 6/3/2016 when  measured in a similar fashion.    2.3 cm short axis portacaval lymph node on series 2 image 67,  previously 2.0 cm on 10/4/2016.    1.2 cm subtle hypodensity in hepatic segment 6 on series 2 image 75,  stable on multiple studies since at least 6/3/2016  Sum of  diameters today: 3.5 cm. Sum of diameters 10/4/2016: 3.2 cm.  Growth = 9%.      12/27/16: C8 niraparib.  105.  1/25/17: C9 niraparib.  108    2/20/17: CT CAP Impression   IMPRESSION:   1. In this patient with history of ovarian cancer there is stable  disease by RECIST criteria as evidenced by:    1a. Mildly increased size of liver metastasis.   1b. Stable portacaval lymphadenopathy.   1c. No evidence of metastatic disease in the chest.  2. Trace emphysematous changes of the lungs.    2/23/17:  pending, C10 niraparib      Past Medical History   Diagnosis Date     Antiplatelet or antithrombotic long-term use      Ascites      Blood clot in the legs      Diabetes (H)      Ovarian cancer (H)      serous,stg IV     Pleural effusion      Pulmonary embolism (H) 2/2012     Refusal of blood transfusions as patient is [employer]      Short gut syndrome      Subclinical hypothyroidism 4/18/2013     Thrombosis of leg        Past Surgical History   Procedure Laterality Date     Vascular surgery       stent left iliac vein     Hysterectomy total abd, zak salpingo-oophorectomy, node dissection, tumor debulking, combined  2/1/2012     Procedure:COMBINED HYSTERECTOMY TOTAL ABDOMINAL, BILATERAL SALPINGO-OOPHORECTOMY, NODE DISSECTION, TUMOR DEBULKING;  Exploratory Laparotomy, Total Abdominal Hysterectomy, Bilateral Salpingo-Oophorectomy, appendectomy,lysis of adhesions, ileal, ascending, transverse and splenic flexure resection, ileal descending bowel renanastomosis, incidental cystotomy repair, CUSA procedure and colonoscopy ; [facility]     Colonoscopy  2/1/2012     Procedure:COLONOSCOPY; With Biopsy; Surgeon:TONI SULLIVAN; Location:[facility] OR     Insert port vascular access       Insert port peritoneal access  4/3/2012     Procedure:INSERT PORT PERITONEAL ACCESS; Intraperitoneal Port Placement (c-arm); Surgeon:SAMUEL CARRASCO; Location:[facility] OR     Colectomy       Laparoscopy diagnostic (gyn)  5/14/2014      Procedure: LAPAROSCOPY DIAGNOSTIC (GYN);  Surgeon: Nga Yeung MD;  Location: [facility] OR     Laparotomy, tumor debulking, combined  5/14/2014     Procedure: COMBINED LAPAROTOMY, TUMOR DEBULKING;  Surgeon: Nga Yeung MD;  Location: [facility] OR     Insert port peritoneal access  5/14/2014     Procedure: INSERT PORT PERITONEAL ACCESS;  Surgeon: Nga Yeung MD;  Location: [facility] OR     Remove catheter peritoneal N/A 8/20/2014     Procedure: REMOVE CATHETER PERITONEAL;  Surgeon: Nga Yeung MD;  Location: [facility] OR     Laparotomy exploratory Right 11/25/2015     Procedure: LAPAROTOMY EXPLORATORY;  Surgeon: Nga Yeung MD;  Location: [facility] OR       Current Outpatient Prescriptions   Medication     ferrous sulfate (IRON) 325 (65 FE) MG tablet     study - niraparib (IDS# 4759) 100 mg capsule     study - niraparib (IDS# 4759) 100 mg capsule     study - niraparib (IDS# 4759) 100 mg capsule     LEVOTHYROXINE SODIUM PO     iohexol (OMNIPAQUE) 140 MG/ML SOLN     order for DME     LORazepam (ATIVAN) 1 MG tablet     METFORMIN HCL PO     diphenoxylate-atropine (LOMOTIL) 2.5-0.025 MG per tablet     cyanocobalamin (VITAMIN B12) 1000 MCG/ML injection     ORDER FOR DME     magnesium oxide (MAG-OX) 400 MG tablet     ASPIRIN PO     potassium chloride (KLOR-CON) 20 MEQ packet     VITAMIN E NATURAL PO     Cholecalciferol (VITAMIN D PO)     HERBALS     Ascorbic Acid (VITAMIN C PO)     Calcium Carbonate-Vitamin D (CALCIUM + D PO)     Current Facility-Administered Medications   Medication     heparin 100 UNIT/ML injection 5 mL     sodium chloride (PF) 0.9% PF flush 20 mL     Facility-Administered Medications Ordered in Other Visits   Medication     heparin 100 UNIT/ML injection 500 Units          Allergies   Allergen Reactions     Nkda [No Known Drug Allergies]        Family History   Problem Relation Age of Onset     CANCER Mother 69     lung, smoker     CANCER Maternal Uncle 65     brain     Colon Cancer Maternal  Aunt 80     colon       Social History     Social History     Marital status:      Spouse name: N/A     Number of children: N/A     Years of education: N/A     Occupational History     Not on file.     Social History Main Topics     Smoking status: Former Smoker     Years: 8.00     Types: Cigarettes     Quit date: 6/21/1980     Smokeless tobacco: Never Used      Comment: started at 11 yo and quit at 18 yo     Alcohol use Yes      Comment: 3x/day wine or jodi     Drug use: No     Sexual activity: Not on file     Other Topics Concern     Not on file     Social History Narrative       Review of Systems     Constitutional:  Negative for fever, chills, weight loss, weight gain, fatigue, decreased appetite, night sweats, recent stressors, height gain, height loss, post-operative complications, incisional pain, hallucinations, increased energy, hyperactivity and confused.   HENT:  Positive for tinnitus. Negative for ear pain, hearing loss, nosebleeds, trouble swallowing, hoarse voice, mouth sores, sore throat, ear discharge, tooth pain, gum tenderness, taste disturbance, smell disturbance, hearing aid, bleeding gums, dry mouth, sinus pain, sinus congestion and neck mass.    Eyes:  Negative for double vision, pain, redness, eye pain, decreased vision, eye watering, eye bulging, eye dryness, flashing lights, spots, floaters, strabismus, tunnel vision, jaundice and eye irritation.   Respiratory:   Negative for cough, hemoptysis, sputum production, shortness of breath, wheezing, sleep disturbances due to breathing, snores loudly, respiratory pain, dyspnea on exertion, cough disturbing sleep and postural dyspnea.    Cardiovascular:  Negative for chest pain, dyspnea on exertion, palpitations, orthopnea, claudication, leg swelling, fingers/toes turn blue, hypertension, hypotension, syncope, history of heart murmur, chest pain on exertion, chest pain at rest, pacemaker, few scattered varicosities, leg pain, sleep  disturbances due to breathing, tachycardia, light-headedness, exercise intolerance and edema.   Gastrointestinal:  Positive for diarrhea. Negative for heartburn, nausea, vomiting, abdominal pain, constipation, blood in stool, melena, rectal pain, bloating, hemorrhoids, bowel incontinence, jaundice, rectal bleeding, coffee ground emesis and change in stool.   Genitourinary:  Negative for bladder incontinence, dysuria, urgency, hematuria, flank pain, vaginal discharge, difficulty urinating, genital sores, dyspareunia, decreased libido, nocturia, voiding less frequently, arousal difficulty, abnormal vaginal bleeding, excessive menstruation, menstrual changes, hot flashes, vaginal dryness and postmenopausal bleeding.   Musculoskeletal:  Negative for myalgias, back pain, joint swelling, arthralgias, stiffness, muscle cramps, neck pain, bone pain, muscle weakness and fracture.   Skin:  Negative for nail changes, itching, poor wound healing, rash, hair changes, skin changes, acne, warts, poor wound healing, scarring, flaky skin, Raynaud's phenomenon, sensitivity to sunlight and skin thickening.   Neurological:  Negative for dizziness, tingling, tremors, speech change, seizures, loss of consciousness, weakness, light-headedness, numbness, headaches, disturbances in coordination, extremity numbness, memory loss, difficulty walking and paralysis.   Endo/Heme:  Negative for anemia, swollen glands and bruises/bleeds easily.   Psychiatric/Behavioral:  Negative for depression, hallucinations, memory loss, decreased concentration, mood swings and panic attacks.    Breast:  Negative for breast discharge, breast mass, breast pain and nipple retraction.   Endocrine:  Negative for altered temperature regulation, polyphagia, polydipsia, unwanted hair growth and change in facial hair.        Physical Exam:    /84  Pulse 104  Temp 98  F (36.7  C) (Oral)  Resp 18  Wt 62.9 kg (138 lb 11.2 oz)  SpO2 99%  BMI 23.08 kg/m2  ECOG  score-0    General: Alert non-toxic appearing female in no acute distress  HEENT: Normocephalic, atraumatic; PERRLA; no scleral icterus; oropharynx pink without lesions; neck supple without lymphadenopathy  Pulmonary: Lungs clear to auscultation, no increased work of breathing noted  Cardiac: Tachycardic, regular rhythm, S1S2, no clicks, murmurs, rubs, or gallops; bilateral lower extremities without edema, dorsalis pedis pulses 2+ bilaterally  GI: Normoactive bowel sounds x4 quadrants, abdomen soft, non-distended, and non-tender to palpation without masses or organomegaly  : Not indicated  Heme: Cervical, supraclavicular, and inguinal nodes without lymphadenopathy  MSK: Moves all extremities, no obvious muscle wasting  Neuro: No gross deficits, normal gait  Skin: Appropriate color for race, warm and dry, no rashes or lesions to unclothed skin  Psych: Pleasant and interactive, affect bright, makes appropriate eye contact, thought process linear    Labs:   Results for MAURY MERA (MRN [id number]) as of 5/9/2017 10:26   Ref. Range 2/23/2017 12:34   Sodium Latest Ref Range: 133 - 144 mmol/L 139   Potassium Latest Ref Range: 3.4 - 5.3 mmol/L 3.4   Chloride Latest Ref Range: 94 - 109 mmol/L 103   Carbon Dioxide Latest Ref Range: 20 - 32 mmol/L 24   Urea Nitrogen Latest Ref Range: 7 - 30 mg/dL 17   Creatinine Latest Ref Range: 0.52 - 1.04 mg/dL 0.77   GFR Estimate Latest Ref Range: >60 mL/min/1.7m2 77   GFR Estimate If Black Latest Ref Range: >60 mL/min/1.7m2 >90...   Calcium Latest Ref Range: 8.5 - 10.1 mg/dL 8.8   Anion Gap Latest Ref Range: 3 - 14 mmol/L 12   Magnesium Latest Ref Range: 1.6 - 2.3 mg/dL 1.7   Albumin Latest Ref Range: 3.4 - 5.0 g/dL 3.6   Protein Total Latest Ref Range: 6.8 - 8.8 g/dL 7.1   Bilirubin Total Latest Ref Range: 0.2 - 1.3 mg/dL 0.4   Alkaline Phosphatase Latest Ref Range: 40 - 150 U/L 110   ALT Latest Ref Range: 0 - 50 U/L 23   AST Latest Ref Range: 0 - 45 U/L 20   Amylase Latest  Ref Range: 30 - 110 U/L 28 (L)    Latest Ref Range: 0 - 30 U/mL 132 (H)   GGT Latest Ref Range: 0 - 40 U/L 29   Lactate Dehydrogenase Latest Ref Range: 81 - 234 U/L 154   Glucose Latest Ref Range: 70 - 99 mg/dL 94   WBC Latest Ref Range: 4.0 - 11.0 10e9/L 5.7   Hemoglobin Latest Ref Range: 11.7 - 15.7 g/dL 12.4   Hematocrit Latest Ref Range: 35.0 - 47.0 % 36.2   Platelet Count Latest Ref Range: 150 - 450 10e9/L 223   RBC Count Latest Ref Range: 3.8 - 5.2 10e12/L 3.35 (L)   MCV Latest Ref Range: 78 - 100 fl 108 (H)   MCH Latest Ref Range: 26.5 - 33.0 pg 37.0 (H)   MCHC Latest Ref Range: 31.5 - 36.5 g/dL 34.3   RDW Latest Ref Range: 10.0 - 15.0 % 13.4   Diff Method Unknown Automated Method   % Neutrophils Latest Units: % 46.9   % Lymphocytes Latest Units: % 37.9   % Monocytes Latest Units: % 12.6   % Eosinophils Latest Units: % 1.6   % Basophils Latest Units: % 0.7   % Immature Granulocytes Latest Units: % 0.3   Nucleated RBCs Latest Ref Range: 0 /100 0   Absolute Neutrophil Latest Ref Range: 1.6 - 8.3 10e9/L 2.7   Absolute Lymphocytes Latest Ref Range: 0.8 - 5.3 10e9/L 2.2   Absolute Monocytes Latest Ref Range: 0.0 - 1.3 10e9/L 0.7   Absolute Eosinophils Latest Ref Range: 0.0 - 0.7 10e9/L 0.1   Absolute Basophils Latest Ref Range: 0.0 - 0.2 10e9/L 0.0   Abs Immature Granulocytes Latest Ref Range: 0 - 0.4 10e9/L 0.0   Absolute Nucleated RBC Unknown 0.0     UA pending   pending    Assessment/Plan:  1) Recurrent stage IIIC bilateral ovarian cancer: Patient tolerating without dose limiting side effects. Proceed with cycle 10 niraparib. 2/20/17 CT CAP with RECIST shows stable disease with mildly increased size of liver metastasis. Reviewed signs and symptoms for when she should contact the clinic or seek additional care, including but not limited to fever, chills, inability to keep down food or fluids, nausea and vomiting not controlled with antiemetics, and diarrhea leading to dehydration. Patient to contact  the clinic with any questions or concerns in the interim. Betzy Jones RN in for teaching and study follow up.    2) Genetic counseling: Testing has been performed and she is negative for mutations in BRCA1, BRCA2, EPCAM, MLH1, MSH2, MSH6, PMS2, PTEN, and TP53 genes    3) Health maintenance issues discussed include to continue following up with PCP for non-gynecologic concerns.    4) Patient verbalized understanding of and agreement with plan    A total of 20 minutes spent with patient, over 50% spent in counseling and coordination of care.    Rylie Rojas MD  Gynecologic Oncology Fellow    Nga Yeung MD    Department of Ob/Gyn and Women's Health  Division of Gynecologic Oncology  [facility]  [phone]      I saw and evaluated the patient with the fellow. I edited and reviewed the above note.

## 2017-02-23 NOTE — LETTER
Odalys Nathan  [address] [facility] [address]    February 23, 2017         To whom it may concern,     My patient Odalys Nathan is dealing with stage four ovarian cancer. Due to the nature and treatment of the disease she at times suffers from anxiety. Her dog Marianne Gamez offers her much needed emotional support to cope with her illness. She benefits having her dog with her under stressful situations.       Sincerely,      [facility]

## 2017-02-23 NOTE — LETTER
Odalys Nathan  [address] [facility] [address]    February 23, 2017         To whom it may concern,     My patient Odalys Nathan is dealing with stage four ovarian cancer. Due to the nature and treatment of the disease she at times suffers from anxiety. Her dog Marianne Gamez offers her much needed emotional support to cope with her illness. She benefits having her dog with her under stressful situations.     Sincerely,         Department of Family Practice  [facility] in [place]

## 2017-02-23 NOTE — NURSING NOTE
"Odalys Nathan is a 56 year old female who presents for:  Chief Complaint   Patient presents with     Port Draw     port accessed by RN, labs collected and sent, line flushed with NS and heparin. Pt tolerated well, vitals taken and pt checked in for next appt.        Initial Vitals:  /84  Pulse 104  Temp 98  F (36.7  C) (Oral)  Resp 18  Wt 62.9 kg (138 lb 11.2 oz)  SpO2 99%  BMI 23.08 kg/m2 Estimated body mass index is 23.08 kg/(m^2) as calculated from the following:    Height as of 12/27/16: 1.651 m (5' 5\").    Weight as of this encounter: 62.9 kg (138 lb 11.2 oz).. Body surface area is 1.7 meters squared. BP completed using cuff size: regular  No Pain (0) No LMP recorded. Patient has had a hysterectomy. Allergies and medications reviewed.     Medications: Medication refills not needed today.  Pharmacy name entered into [employer]:    [id number] - [place], [place] - [address] [facility] - [place], [place] - [address]  [facility] - [place], [place] - [address]    Comments:     6 minutes for nursing intake (face to face time)   Sophia Oneill CMA        "

## 2017-03-19 ASSESSMENT — ENCOUNTER SYMPTOMS
CONSTIPATION: 0
DIARRHEA: 1
FEVER: 0
NERVOUS/ANXIOUS: 1
DECREASED APPETITE: 0
RECTAL BLEEDING: 0
POLYPHAGIA: 0
TROUBLE SWALLOWING: 0
SINUS PAIN: 0
FATIGUE: 1
BLOOD IN STOOL: 0
HALLUCINATIONS: 0
PANIC: 0
TASTE DISTURBANCE: 0
BOWEL INCONTINENCE: 0
WEIGHT GAIN: 0
INSOMNIA: 1
WEIGHT LOSS: 0
CHILLS: 0
SORE THROAT: 0
RECTAL PAIN: 0
ABDOMINAL PAIN: 0
NECK MASS: 0
VOMITING: 0
BLOATING: 0
SINUS CONGESTION: 0
HOARSE VOICE: 0
INCREASED ENERGY: 0
POLYDIPSIA: 0
SMELL DISTURBANCE: 0
HEARTBURN: 0
JAUNDICE: 0
DEPRESSION: 0
NAUSEA: 0
NIGHT SWEATS: 0
DECREASED CONCENTRATION: 0
ALTERED TEMPERATURE REGULATION: 0

## 2017-03-22 ENCOUNTER — APPOINTMENT (OUTPATIENT)
Dept: LAB | Facility: CLINIC | Age: 57
End: 2017-03-22
Attending: OBSTETRICS & GYNECOLOGY
Payer: COMMERCIAL

## 2017-03-22 ENCOUNTER — RESEARCH ENCOUNTER (OUTPATIENT)
Dept: ONCOLOGY | Facility: CLINIC | Age: 57
End: 2017-03-22

## 2017-03-22 ENCOUNTER — ONCOLOGY VISIT (OUTPATIENT)
Dept: ONCOLOGY | Facility: CLINIC | Age: 57
End: 2017-03-22
Attending: NURSE PRACTITIONER
Payer: COMMERCIAL

## 2017-03-22 VITALS
DIASTOLIC BLOOD PRESSURE: 73 MMHG | RESPIRATION RATE: 16 BRPM | TEMPERATURE: 98.6 F | WEIGHT: 141.3 LBS | HEART RATE: 100 BPM | OXYGEN SATURATION: 98 % | SYSTOLIC BLOOD PRESSURE: 134 MMHG | BODY MASS INDEX: 23.54 KG/M2 | HEIGHT: 65 IN

## 2017-03-22 DIAGNOSIS — Z79.899 ENCOUNTER FOR LONG-TERM (CURRENT) USE OF MEDICATIONS: ICD-10-CM

## 2017-03-22 DIAGNOSIS — C56.1 OVARIAN CANCER, RIGHT (H): Primary | ICD-10-CM

## 2017-03-22 DIAGNOSIS — C56.2 OVARIAN CANCER, LEFT (H): ICD-10-CM

## 2017-03-22 DIAGNOSIS — Z00.6 PATIENT IN CANCER RELATED RESEARCH STUDY: ICD-10-CM

## 2017-03-22 LAB
ALBUMIN SERPL-MCNC: 3.8 G/DL (ref 3.4–5)
ALBUMIN UR-MCNC: NEGATIVE MG/DL
ALP SERPL-CCNC: 104 U/L (ref 40–150)
ALT SERPL W P-5'-P-CCNC: 23 U/L (ref 0–50)
AMYLASE SERPL-CCNC: 29 U/L (ref 30–110)
ANION GAP SERPL CALCULATED.3IONS-SCNC: 11 MMOL/L (ref 3–14)
APPEARANCE UR: CLEAR
AST SERPL W P-5'-P-CCNC: 18 U/L (ref 0–45)
BACTERIA #/AREA URNS HPF: ABNORMAL /HPF
BASOPHILS # BLD AUTO: 0 10E9/L (ref 0–0.2)
BASOPHILS NFR BLD AUTO: 0.5 %
BILIRUB SERPL-MCNC: 0.4 MG/DL (ref 0.2–1.3)
BILIRUB UR QL STRIP: NEGATIVE
BUN SERPL-MCNC: 17 MG/DL (ref 7–30)
CALCIUM SERPL-MCNC: 9 MG/DL (ref 8.5–10.1)
CANCER AG125 SERPL-ACNC: 132 U/ML (ref 0–30)
CHLORIDE SERPL-SCNC: 103 MMOL/L (ref 94–109)
CO2 SERPL-SCNC: 26 MMOL/L (ref 20–32)
COLOR UR AUTO: COLORLESS
CREAT SERPL-MCNC: 0.75 MG/DL (ref 0.52–1.04)
DIFFERENTIAL METHOD BLD: ABNORMAL
EOSINOPHIL # BLD AUTO: 0.1 10E9/L (ref 0–0.7)
EOSINOPHIL NFR BLD AUTO: 1.6 %
ERYTHROCYTE [DISTWIDTH] IN BLOOD BY AUTOMATED COUNT: 13 % (ref 10–15)
GFR SERPL CREATININE-BSD FRML MDRD: 79 ML/MIN/1.7M2
GGT SERPL-CCNC: 32 U/L (ref 0–40)
GLUCOSE SERPL-MCNC: 103 MG/DL (ref 70–99)
GLUCOSE UR STRIP-MCNC: NEGATIVE MG/DL
HCT VFR BLD AUTO: 36.9 % (ref 35–47)
HGB BLD-MCNC: 12.6 G/DL (ref 11.7–15.7)
HGB UR QL STRIP: NEGATIVE
IMM GRANULOCYTES # BLD: 0 10E9/L (ref 0–0.4)
IMM GRANULOCYTES NFR BLD: 0.5 %
KETONES UR STRIP-MCNC: NEGATIVE MG/DL
LDH SERPL L TO P-CCNC: 155 U/L (ref 81–234)
LEUKOCYTE ESTERASE UR QL STRIP: ABNORMAL
LYMPHOCYTES # BLD AUTO: 2.3 10E9/L (ref 0.8–5.3)
LYMPHOCYTES NFR BLD AUTO: 40.3 %
MAGNESIUM SERPL-MCNC: 1.7 MG/DL (ref 1.6–2.3)
MCH RBC QN AUTO: 36.7 PG (ref 26.5–33)
MCHC RBC AUTO-ENTMCNC: 34.1 G/DL (ref 31.5–36.5)
MCV RBC AUTO: 108 FL (ref 78–100)
MONOCYTES # BLD AUTO: 0.6 10E9/L (ref 0–1.3)
MONOCYTES NFR BLD AUTO: 10.1 %
NEUTROPHILS # BLD AUTO: 2.7 10E9/L (ref 1.6–8.3)
NEUTROPHILS NFR BLD AUTO: 47 %
NITRATE UR QL: NEGATIVE
NRBC # BLD AUTO: 0 10*3/UL
NRBC BLD AUTO-RTO: 0 /100
PH UR STRIP: 5 PH (ref 5–7)
PLATELET # BLD AUTO: 226 10E9/L (ref 150–450)
POTASSIUM SERPL-SCNC: 3.6 MMOL/L (ref 3.4–5.3)
PROT SERPL-MCNC: 7.2 G/DL (ref 6.8–8.8)
RBC # BLD AUTO: 3.43 10E12/L (ref 3.8–5.2)
RBC #/AREA URNS AUTO: 0 /HPF (ref 0–2)
SODIUM SERPL-SCNC: 140 MMOL/L (ref 133–144)
SP GR UR STRIP: 1 (ref 1–1.03)
URN SPEC COLLECT METH UR: ABNORMAL
UROBILINOGEN UR STRIP-MCNC: 0 MG/DL (ref 0–2)
WBC # BLD AUTO: 5.7 10E9/L (ref 4–11)
WBC #/AREA URNS AUTO: <1 /HPF (ref 0–2)

## 2017-03-22 PROCEDURE — 99213 OFFICE O/P EST LOW 20 MIN: CPT | Mod: ZP | Performed by: NURSE PRACTITIONER

## 2017-03-22 PROCEDURE — 85025 COMPLETE CBC W/AUTO DIFF WBC: CPT | Performed by: NURSE PRACTITIONER

## 2017-03-22 PROCEDURE — 83615 LACTATE (LD) (LDH) ENZYME: CPT | Performed by: NURSE PRACTITIONER

## 2017-03-22 PROCEDURE — 81001 URINALYSIS AUTO W/SCOPE: CPT | Performed by: NURSE PRACTITIONER

## 2017-03-22 PROCEDURE — 83735 ASSAY OF MAGNESIUM: CPT | Performed by: NURSE PRACTITIONER

## 2017-03-22 PROCEDURE — 36591 DRAW BLOOD OFF VENOUS DEVICE: CPT

## 2017-03-22 PROCEDURE — 99212 OFFICE O/P EST SF 10 MIN: CPT | Mod: ZF

## 2017-03-22 PROCEDURE — 86304 IMMUNOASSAY TUMOR CA 125: CPT | Performed by: NURSE PRACTITIONER

## 2017-03-22 PROCEDURE — 80053 COMPREHEN METABOLIC PANEL: CPT | Performed by: NURSE PRACTITIONER

## 2017-03-22 PROCEDURE — 82150 ASSAY OF AMYLASE: CPT | Performed by: NURSE PRACTITIONER

## 2017-03-22 PROCEDURE — 82977 ASSAY OF GGT: CPT | Performed by: NURSE PRACTITIONER

## 2017-03-22 PROCEDURE — 25000128 H RX IP 250 OP 636: Mod: ZF | Performed by: NURSE PRACTITIONER

## 2017-03-22 RX ORDER — HEPARIN SODIUM (PORCINE) LOCK FLUSH IV SOLN 100 UNIT/ML 100 UNIT/ML
5 SOLUTION INTRAVENOUS EVERY 8 HOURS
Status: DISCONTINUED | OUTPATIENT
Start: 2017-03-22 | End: 2017-03-22 | Stop reason: HOSPADM

## 2017-03-22 RX ADMIN — SODIUM CHLORIDE, PRESERVATIVE FREE 5 ML: 5 INJECTION INTRAVENOUS at 13:01

## 2017-03-22 ASSESSMENT — ENCOUNTER SYMPTOMS
TROUBLE SWALLOWING: 0
DIZZINESS: 0
CONSTIPATION: 0
HEARTBURN: 0
FATIGUE: 1
SLEEP DISTURBANCES DUE TO BREATHING: 0
ORTHOPNEA: 0
HOT FLASHES: 0
SPUTUM PRODUCTION: 0
LOSS OF CONSCIOUSNESS: 0
HEMOPTYSIS: 0
BACK PAIN: 0
SKIN CHANGES: 0
EYE IRRITATION: 0
MYALGIAS: 0
BOWEL INCONTINENCE: 0
MUSCLE CRAMPS: 0
NERVOUS/ANXIOUS: 1
NAIL CHANGES: 0
DECREASED CONCENTRATION: 0
SEIZURES: 0
HEMATURIA: 0
ABDOMINAL PAIN: 0
POLYPHAGIA: 0
WHEEZING: 0
PALPITATIONS: 0
SPEECH CHANGE: 0
DIFFICULTY URINATING: 0
BRUISES/BLEEDS EASILY: 0
TREMORS: 0
LIGHT-HEADEDNESS: 0
SWOLLEN GLANDS: 0
HYPERTENSION: 0
COUGH DISTURBING SLEEP: 0
DISTURBANCES IN COORDINATION: 0
RECTAL BLEEDING: 0
WEAKNESS: 0
NIGHT SWEATS: 0
BLOOD IN STOOL: 0
DYSPNEA ON EXERTION: 0
NECK PAIN: 0
VOMITING: 0
ALTERED TEMPERATURE REGULATION: 0
WEIGHT GAIN: 0
TASTE DISTURBANCE: 0
NUMBNESS: 0
SNORES LOUDLY: 0
BREAST PAIN: 0
RECTAL PAIN: 0
SINUS PAIN: 0
CLAUDICATION: 0
INCREASED ENERGY: 0
PANIC: 0
HEADACHES: 0
DECREASED APPETITE: 0
POLYDIPSIA: 0
NECK MASS: 0
MUSCLE WEAKNESS: 0
DEPRESSION: 0
EXTREMITY NUMBNESS: 0
SHORTNESS OF BREATH: 0
JOINT SWELLING: 0
TINGLING: 0
NAUSEA: 0
DOUBLE VISION: 0
MEMORY LOSS: 0
LEG PAIN: 0
BLOATING: 0
POSTURAL DYSPNEA: 0
INSOMNIA: 1
FEVER: 0
LEG SWELLING: 0
DIARRHEA: 1
TACHYCARDIA: 0
STIFFNESS: 0
EYE REDNESS: 0
ARTHRALGIAS: 0
DYSURIA: 0
BREAST MASS: 0
FLANK PAIN: 0
DECREASED LIBIDO: 0
HOARSE VOICE: 0
COUGH: 0
HYPOTENSION: 0
HALLUCINATIONS: 0
WEIGHT LOSS: 0
RESPIRATORY PAIN: 0
EXERCISE INTOLERANCE: 0
SORE THROAT: 0
POOR WOUND HEALING: 0
PARALYSIS: 0
SMELL DISTURBANCE: 0
SYNCOPE: 0
CHILLS: 0
JAUNDICE: 0
EYE PAIN: 0
SINUS CONGESTION: 0
EYE WATERING: 0

## 2017-03-22 ASSESSMENT — PAIN SCALES - GENERAL: PAINLEVEL: NO PAIN (0)

## 2017-03-22 NOTE — NURSING NOTE
Patient here for C11D1 visit for the Tesaro/Quadra Niraparib study.  Patient denies any issues at this time.  Patient's labs were reviewed and ok to continue with current dose of study medication.  Next appointments made per protocol.  Patient returned 39 pills from bottle number [id number] along with study diary.  Pills remaining match pills taken on diary.  Bottle number [id number] dispensed to patient along with study diary.      Prudence Jones RN     Pager [phone]

## 2017-03-22 NOTE — NURSING NOTE
Chief Complaint   Patient presents with     Port Draw     Pt with hx of ovarian ca labs collected via portacath.

## 2017-03-22 NOTE — PROGRESS NOTES
Gynecologic Oncology Follow-Up Note  RE: Odalys Nathan  MRN: [id number]  : 1960  Date of Visit: 2017    CC: Odalys Nathan is a 56 year old year old female with recurrent stage IIIC bilateral ovarian cancer who presents today for follow up regarding disease management while on the TESARO niraparib trial.    HPI: Odalys comes to the clinic accompanied by her partner Marcos. She continues to feel well on niraparib but does note increasing fatigue. She thinks this could be due to her hypothyroidism and plans to follow up with her PCP regarding this. She has continued to take ferrous sulfate 325mg TID with resolution of her anemia- she is tolerating this without side effects. She reports taking iron has improved her baseline diarrhea. She continues with chronic tinnitus she has had since she was a child, denies any worsening of this. She notes anxiety regarding her disease process but states she has a good support system and denies need for further intervention. She self reports an ECOG score of 1. She is eating and drinking well and reports feeling ready to continue with niraparib.       Brief Oncology History:  2012 - Admitted to hospital for 2 weeks of intermittent abdominal cramping, distention, diarrhea and N/V. CT of abdomen/pelvis significant for small bowel obstruction, a heterogenous soft tissue density in the pelvis, omental nodules, and ascites. Bilateral adnexal masses per U/S of pelvis. CA-125 was elevated at 987, CEA was normal at 1.0.    12 - Therapeutic paracentesis (4 L) with cytology confirming malignancy (ID positive, weak ER positive, CK-7 positive consistent with GYN primary). Surgery recommended d/t potential for falling blood counts 2/2 chemotherapy (patient is Nond[employer]ational and limiting blood transfusion)    12 - Exploratory laparotomy, MAR BSO, lysis of adhesion, Appendectomy, Repair cystotomy, Omentectomy Btuf-edmula-wytpyhlap-transverse-colon  resection, Ileo-descending colon anastomosis, CUSA, & Colonoscopy (done by Dr. Amato and colorectal team).  2/20/2012 - Admitted to hospital for bilateral pulmonary emboli and drainage of pleural effusion. Started on Lovenox.  2/28/12-3/21/12: Cycle #1-2 Carbo/Taxol IV  3/29/12 - Started on Keflex by her PCP for infection in her healing wound (immediately below her umbilicus).    4/11/12-6/14/12: Cycle #3-6 IV chemo, Cycle #1 IV/IP chemo  7/16/12 -  8, CT PRADEEP - enrolled in  - observation arm  3/4/13-6/28/13:  6, 9, 12, 15, 20.  7/1/13: CT Chest, Abdomen, Pelvis IMPRESSION:  1. Worsening metastatic ovarian carcinoma suggested by increased size of soft tissue nodules anterior to the right psoas muscle, that may represent growing mesenteric lymphadenopathy.  2. Remaining prominent right lower quadrant mesenteric lymph nodes are not significantly changed from CT 7/16/2012.  3. Clustered nodular opacities in the right lower lobe are not significant change from 7/16/2012 and remain indeterminate. Again, the appearance and distribution is suggestive of an infectious etiology.  4. Stable 8 mm soft tissue nodule in left breast, unchanged since at least 02/20/2012. This can be observed on followup studies, but correlation with mammography could be considered.  Decision made to start Doxil/Carbo.  7/11/13: The left ventricular ejection fraction is normal at 66.4%.  7/12/13-9/6/13: Cycle #1-3 Doxil/Carbo.  10, 11.    9/30/13 CT C/A/P Impression:  1. Overall, favorable response to treatment with decreasing size of soft tissue nodules tracking along the anterior aspect of the right psoas muscle.  2. 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  3. Improved cluster of right lower lobe pulmonary nodular opacities. These may represent resolving infection.  10/3/13-12/9/13:  9, 8, 10. Cycle 4-6 Doxil/Carbo.    1/13/14 CT C/A/P Impression:   1. Stable appearance of metastatic ovarian cancer. Scattered  soft tissue nodules along the anterior aspect of the right psoas muscle are unchanged in size. Mild mesenteric lymphadenopathy is unchanged.  2. Clustered micronodules in the right lower lobe are unchanged from 9/30/2013, but improved from 7/1/2013. This history suggests a postinflammatory/postinfectious etiology.  3. 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  1/16/14 Discussed multiple options for her based on relatively stable-appearing disease on CT but slight increase in  (which has had small increase to 20 with last recurrence) including chemo break with recheck of  in 1 month, starting new chemo agent immediately, and exploratory surgery with possible resection of nodules. She is considered platinum-sensitive based on > 1 year remission after Taxol/Carbo, which we will take into consideration for future chemo planning. I am not inclined to surgery at this time given difficulty she already has with diarrhea secondary to past colon resection. I suspect we would need to resect further bowel due to mesenteric disease. Also explained inherent risks of any major surgery. Also mentioned maintenance chemo, but this has not been shown to increase overall survival and would likely decrease her quality of life without significant benefit. Family is going on vacation to [place] in 2 weeks and Odalys does not want to have chemo prior to that, so will plan to take 1 month break. She can have  at that time (discussed checking toady since last draw was in early December, but as it would likely not change treatment plan and she has h/o slow rising , will not check today).  2/17/14  16    2/20/14: Decision to take break from chemo for two months, followed by CT and CA-125.    4/21/14  27  4/21/14 CT C/A/P Impression:    1. Increased size of 2 low-attenuation lymph nodes anterior to the right psoas muscle is concerning for worsening metastatic ovarian cancer.    2. New circumferential  "thickening of a 3.8 cm length segment of distal transverse colon is likely physiologic. Recommend attention on followup imaging.    3. Grossly unchanged size of clustered small nodules versus scarring in the right lower lobe the lungs.    4. Stable thrombosis of the right ovarian vein.  5/14/14: Diagnostic laparoscopy converted to exploratory laparotomy and removal of mesenteric masses, tumor debulking, peritoneal biopsies and intraperitoneal port placement. On laparoscopy, it was noted that there were small nodules on the anterior abdominal wall near the previous incision, small nodules on the right pelvic sidewall as well. Nodules were palpated in the mesentery; however, as it was unable to clarify where the origin of the nodules was, the decision was made to open the patient. On opening there was found to be approximately a 3 cm nodule in the small bowel mesentery and another separate approximately 2 cm nodule in the bowel mesentery. Pelvis without evidence of cancer, some mesenteric lymph nodes were palpated. No evidence otherwise of any disseminated cancer throughout the abdomen.    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 (2/2)  -Largest metastasis: 1.3 cm  -See comment  D: Peritoneum, right paracolic gutter #2, biopsy:  -Fibroadipose tissue with granulomatous inflammation surrounding refractile material  -Negative for malignancy   E: Small bowel adhesion, biopsy:  -Fibroconnective tissue, consistent with adhesion  -Negative for malignancy  F: Lymph nodes, mesentry, not otherwise specified, excision:  -Two lymph nodes, negative for metastatic carcinoma (0/2)  G: " "Lymph node, mesentery, \"#2\", excision:  -One lymph node, negative for metastatic carcinoma (0/1)  H: Lymph nodes, mesentery, \"nodule #2\", excision:  -Five lymph nodes, negative for metastatic carcinoma (0/5)  COMMENT:  Some of the specimens show post-operative changes. Others show possible treatment related changes, including necrosis. The metastatic carcinoma in the mesenteric lymph nodes (specimen C) shows variable morphology, including relatively low grade tumor with papillary architecture, and high grade tumor comprised of nests of tumor cells with irregular, slit-like spaces and marked nuclear pleomorphism.    5/29/14: Cycle 1 IV PACLitaxel / IP CISplatin / IP PACLitaxel.  - 28.  6/26/14: Cycle #2 IV/IP.  10  8/5/14: CT chest/abd/pelvis IMPRESSION     1. In this patient with ovarian cancer, overall findings are indicative of stable/slight improvement, as multiple mesenteric lymphadenopathy and scattered nodular peritoneal soft tissue mass lesions appear unchanged or slightly smaller since 4/21/2014.    2. 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 Mild dilatation of the second and the third portion of the duodenum with a narrow SMA angle. This could represent SMA syndrome, if clinically correlated.17/14:    Cycle #3 IV/IP.  10.    CT chest/abd/pelvis with contrast on 8/5/14    Impression:    1. In this patient with ovarian cancer, overall findings are indicative of stable/slight improvement, as multiple mesenteric lymphadenopathy and scattered nodular peritoneal soft tissue mass lesions appear unchanged or slightly smaller since 4/21/2014.    2. 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 Mild dilatation of the second and the third portion of the duodenum with a narrow SMA angle. This could represent SMA syndrome, if clinically correlated.  8/7/14: Cycle #4 Taxol/Carbo (changed from IV/IP).  10. She has been feeling okay. She is unsure if she can finish " out the course of 6 cycles IV/IP taxol/cisplatin. She feels like she has the flu for about a week then starts feeling gradually better after each chemo cycle. Her spouse notes that she actually has been more sick with the treatments than she initially admits here. She was also previously having some rib pain. Denies any rib pain now. Denies any chest pain or shortness of breath.  Plan: discussed recent CT cap results and switching to just IV as she is feeling miserable with IP treatments. Switch to IV carbo/taxol as patient is platinum sensitive.  We also discussed her taking part of the tesaro trial, which would require BRCA testing. She would like to take part in this trial if eligible.  8/20/14: Remove Intraperitoneal Port ( Port and catheter intact - discarded)  8/28/14: Cycle #5 Taxol/Carbo held due to thrombocytopenia.  6.    She denies any vaginal bleeding, no changes in her bowel or bladder habits, no nausea/emesis, no lower extremity edema, and no difficulties eating or sleeping. She denies any abdominal discomfort/bloating, no fevers or chills, and no chest pain or shortness of breath. She states her diarrhea is the same. She reports some fatigue which improves about 1-2 weeks after her chemotherapy. She states she does not need any medication refills and she was told she does not meet the criteria for the TESARO trial. She states she has 3 bags of iv fluids left over from her previous chemotherapy and will give these to herself. She states she is ready for her treatment today.    9/29/14: Cycle #6 Taxol/Carbo  6. Insurance questions regarding GSF coverage today. No concerns other than fatigue. Taking iron for anemia and does not desire blood transfusion. Using neulasta for neutropenia. Using home IV hydration if needed. Baseline unchanged. No abdominal bloating, constipation, diarrhea, pain, vaginal or rectal bleeding, cough or dyspnea, fluid retention.    10/16/14: Impression:    1. Nodular  peritoneal soft tissue mass in the right lower quadrant adjacent to the psoas muscle is no longer appreciated. Adjacent prominent lymphadenopathy is unchanged from previous exam. No new peritoneal lesions.    2. 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    10/20/14:  5. CT chest/abdomen/pelvis on 10/16/14 showed nodular peritoneal soft tissue mass in the right lower quadrant adjacent to the psoas muscle is no longer appreciated. Adjacent prominent lymphadenopathy is unchanged from previous exam. 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  1/27/15:  6.  4/28/15:  14.  5/26/15:  18.  6/2/15: CT cap Impression:  1. Postsurgical changes of hysterectomy and bilateral salpingo-oophorectomy for ovarian cancer. There is a new 8 mm hazy, ill-defined hypoattenuating lesion in hepatic segment 6 which is suspicious for a metastatic deposit. Further evaluation with ultrasound in recommended.    2. Increased size of a left retroperitoneal lymph node which is indeterminate but may represent a ciro metastasis. Mildly prominent lymph nodes in the right lower quadrant are not significantly changed.  3. Moderate colonic stool burden.    6/4/15: US abdomen IMPRESSION:    Hyperechoic lesion in the right hepatic lobe, consistent with hemangioma. This does not corresponds to the area of the lesion seen on CT from 6/2/2015. An MR would be helpful for identifying and characterizing the lesion from the recent CT.  6/12/15: MR abd IMPRESSION:  1. New 20 x 11 mm enhancing lesions between the right obliques, concerning for metastatic disease. This lesions should be amenable to percutaneous biopsy, if indicated.  2. Correlating to the lesion visualized on comparison CT is a hepatic segment 6 subcapsular 7 mm lesion. Overall the appearance favors the diagnosis of a simple cyst. However, there is faint suggestion of mild peripheral arterial enhancement. Although this  is favored as  artifactual, this should be followed up to confirm stability. Recommend 6 month followup.  3. Hepatic segment 6, 5 mm lesion too small to technically characterize. Differential would favor FNH, less likely flash filling hemangioma. Recommend attention on followup.  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     9/1/15: Cycle #1 Avastin/Cytoxan.   31.     9/24/15:feeling generally well. She says she has been having back and stomach spasms. She is taking cytosine daily (she ran out yesterday). She also says its affecting her voice. Admits that it burns occasionally. She is eating and drinking normal. She also admit diarrhea, 5-7 times daily, lose/watery. She trying to stay hydrated and eat fiber. She also says that her body is sore, especially the bottom of her feet. Her blood pressure is normal. She also admits having headache after her first infusion.      9/24/15: Cycle #2 Avastin/Cytoxan.  19.  10/15/15: Cycle #3 Avastin/Cytoxan.  16.     11/6/15: CT c/a/p IMPRESSION:    1. Stable postoperative change of MAR/BSO for ovarian cancer.  2. The lesion in the right flank abdominal musculature is slightly decreased in size. Otherwise, stable examination.  2. No evidence of metastatic disease in the chest.    11/20/15: Treatment planning visit,  16    11/25/15: surgical pathology report  FINAL DIAGNOSIS:  Soft tissue, right oblique muscle mass, excision:  -Recurrent ovarian serous carcinoma  -Carcinoma is present less than 1 mm from one resection margin  -Background skeletal muscle and fibroadipose tissue    1/4/16:  22  1/11/16-1/27/16: Radiation to right flank x 12 treatments  4/7/2016:  94. CT cap IMPRESSION:    In this patient with ovarian cancer status post MAR/BSO and descending/transverse colectomy:  1. No evidence for malignancy in  the chest, abdomen, or pelvis.  2. Stable small hypodense segment 6 liver lesion, appears more likely benign, possibly a cyst.  5/13/16:  124.  6/3/16: PET CT IMPRESSION: In this patient with a history of ovarian cancer:  1. Hypermetabolic and enlarging periaortic and perihepatic lymphadenopathy compatible with metastatic disease, as detailed above.  2. Although hypodense lesion in hepatic segment 6 has been present since 6/2/2015 associated hypermetabolism makes this lesion highly concerning for metastatic disease.    Plan: to start Niraparib under TESARO study.     6/9/16:  137.  6/14/16: Cycle #1 Niraparib.    6/28/16: Cycle #1 D15 Niraparib.    7/5/16:  100.    7/11/16: Cycle #2 Nraparib.   83.    8/3/2016: PET CT IMPRESSION:    In this patient with known history of ovarian cancer:  1) New pleural based nodular opacities in the lateral and inferior aspects of the bilateral lower lobes, worse in the left lung. Likely infection. Close follow up is recommended.      2) Slight decrease in hypermetabolic abdominal lymphadenopathy. 2 hypermetabolic lymph nodes persist.  3) Unchanged right hepatic lobe metastatic lesion.     8/9/16: Cycle #3 Niraparib.  69.  9/6/16: Cycle #4 Niraparib.  53. Dose held due to anemia.  9/13/16: Eval for potential cycle 4 niraparib. Dose held due to anemia.  10/4/16: CT CAP impression:  IMPRESSION: In this patient with a known history of ovarian cancer:  1. There has been interval resolution of pleural-based nodular  opacities which likely represented infection.  2. Abdominal lymphadenopathy in the form of 2 portacaval lymph nodes  have not significantly changed in size, noted to be hypermetabolic on  prior PET/CT.  3. Previously demonstrated metastatic lesion in the right lobe of the  liver is not significantly changed.  10/11/16:  60  11/1/16: C6 niraparib,  75  11/29/16: C7 niraparib.  78. CT CAP impression as follows:  Target  lesions (RECIST criteria):       A previously described target lesion superior to the head of the  pancreas (series 2, image 64)  (referred to as a perihepatic node on  6/3/2016) may not be a valid target lesion because it measured less  than 1.5 cm originally. However, this particular node has decreased in  size, now measuring 7 mm in short axis versus 14 mm on 6/3/2016 when  measured in a similar fashion.       2.3 cm short axis portacaval lymph node on series 2 image 67,  previously 2.0 cm on 10/4/2016.       1.2 cm subtle hypodensity in hepatic segment 6 on series 2 image 75,  stable on multiple studies since at least 6/3/2016     Sum of diameters today: 3.5 cm. Sum of diameters 10/4/2016: 3.2 cm.  Growth = 9%.    12/27/16: C8 niraparib.  105.  1/25/17: C9 niraparib.  108.  2/23/17: C10 niraparib.  132.   CT CAP impression:  Sum of target lesion diameters today: 3.7 cm. Sum of target lesion  diameters on 11/28/2016: 3.5 cm. Growth= 6%  1. In this patient with history of ovarian cancer there is stable  disease by RECIST criteria as evidenced by:   1a. Mildly increased size of liver metastasis.  1b. Stable portacaval lymphadenopathy.  1c. No evidence of metastatic disease in the chest.  2. Trace emphysematous changes of the lungs.  3/22/17: C11 niraparib.  132.      Past Medical History:   Diagnosis Date     Antiplatelet or antithrombotic long-term use      Ascites      Blood clot in the legs      Diabetes (H)      Ovarian cancer (H)     serous,stg IV     Pleural effusion      Pulmonary embolism (H) 2/2012     Refusal of blood transfusions as patient is Yazidism      Short gut syndrome      Subclinical hypothyroidism 4/18/2013     Thrombosis of leg        Past Surgical History:   Procedure Laterality Date     COLECTOMY       COLONOSCOPY  2/1/2012    Procedure:COLONOSCOPY; With Biopsy; Surgeon:TONI SULLIVAN; Location:[facility]     HYSTERECTOMY TOTAL ABD, RHIANNA SALPINGO-OOPHORECTOMY,  NODE DISSECTION, TUMOR DEBULKING, COMBINED  2/1/2012    Procedure:COMBINED HYSTERECTOMY TOTAL ABDOMINAL, BILATERAL SALPINGO-OOPHORECTOMY, NODE DISSECTION, TUMOR DEBULKING;  Exploratory Laparotomy, Total Abdominal Hysterectomy, Bilateral Salpingo-Oophorectomy, appendectomy,lysis of adhesions, ileal, ascending, transverse and splenic flexure resection, ileal descending bowel renanastomosis, incidental cystotomy repair, CUSA procedure and colonoscopy ; [facility]     INSERT PORT PERITONEAL ACCESS  4/3/2012    Procedure:INSERT PORT PERITONEAL ACCESS; Intraperitoneal Port Placement (c-arm); Surgeon:SAMUEL CARRASCO; Location:[facility]     INSERT PORT PERITONEAL ACCESS  5/14/2014    Procedure: INSERT PORT PERITONEAL ACCESS;  Surgeon: Nga Yeung MD;  Location: [facility] OR     INSERT PORT VASCULAR ACCESS       LAPAROSCOPY DIAGNOSTIC (GYN)  5/14/2014    Procedure: LAPAROSCOPY DIAGNOSTIC (GYN);  Surgeon: Nga Yeung MD;  Location: [facility] OR     LAPAROTOMY EXPLORATORY Right 11/25/2015    Procedure: LAPAROTOMY EXPLORATORY;  Surgeon: Nga Yeung MD;  Location: [facility]     LAPAROTOMY, TUMOR DEBULKING, COMBINED  5/14/2014    Procedure: COMBINED LAPAROTOMY, TUMOR DEBULKING;  Surgeon: Nga Yeung MD;  Location: [facility] OR     REMOVE CATHETER PERITONEAL N/A 8/20/2014    Procedure: REMOVE CATHETER PERITONEAL;  Surgeon: Nga Yeung MD;  Location: [facility] OR     VASCULAR SURGERY      stent left iliac vein       Current Outpatient Prescriptions   Medication     study - niraparib (IDS# 4759) 100 mg capsule     ferrous sulfate (IRON) 325 (65 FE) MG tablet     study - niraparib (IDS# 4759) 100 mg capsule     study - niraparib (IDS# 4759) 100 mg capsule     study - niraparib (IDS# 4759) 100 mg capsule     LEVOTHYROXINE SODIUM PO     iohexol (OMNIPAQUE) 140 MG/ML SOLN     order for DME     LORazepam (ATIVAN) 1 MG tablet     METFORMIN HCL PO     diphenoxylate-atropine (LOMOTIL) 2.5-0.025 MG per tablet     cyanocobalamin  (VITAMIN B12) 1000 MCG/ML injection     ORDER FOR DME     magnesium oxide (MAG-OX) 400 MG tablet     ASPIRIN PO     potassium chloride (KLOR-CON) 20 MEQ packet     VITAMIN E NATURAL PO     Cholecalciferol (VITAMIN D PO)     HERBALS     Ascorbic Acid (VITAMIN C PO)     Calcium Carbonate-Vitamin D (CALCIUM + D PO)     No current facility-administered medications for this visit.      Facility-Administered Medications Ordered in Other Visits   Medication     heparin 100 UNIT/ML injection 5 mL     sodium chloride (PF) 0.9% PF flush 20 mL     heparin 100 UNIT/ML injection 500 Units          Allergies   Allergen Reactions     Nkda [No Known Drug Allergies]        Family History   Problem Relation Age of Onset     CANCER Mother 69     lung, smoker     CANCER Maternal Uncle 65     brain     Colon Cancer Maternal Aunt 80     colon       Social History     Social History     Marital status:      Spouse name: N/A     Number of children: N/A     Years of education: N/A     Occupational History     Not on file.     Social History Main Topics     Smoking status: Former Smoker     Years: 8.00     Types: Cigarettes     Quit date: 6/21/1980     Smokeless tobacco: Never Used      Comment: started at 9 yo and quit at 16 yo     Alcohol use Yes      Comment: 3x/day wine or jodi     Drug use: No     Sexual activity: Not on file     Other Topics Concern     Not on file     Social History Narrative       Review of Systems     Constitutional:  Positive for fatigue. Negative for fever, chills, weight loss, weight gain, decreased appetite, night sweats, recent stressors, height gain, height loss, post-operative complications, incisional pain, hallucinations, increased energy, hyperactivity and confused.   HENT:  Positive for tinnitus. Negative for ear pain, hearing loss, nosebleeds, trouble swallowing, hoarse voice, mouth sores, sore throat, ear discharge, tooth pain, gum tenderness, taste disturbance, smell disturbance, hearing aid,  bleeding gums, dry mouth, sinus pain, sinus congestion and neck mass.    Eyes:  Negative for double vision, pain, redness, eye pain, decreased vision, eye watering, eye bulging, eye dryness, flashing lights, spots, floaters, strabismus, tunnel vision, jaundice and eye irritation.   Respiratory:   Negative for cough, hemoptysis, sputum production, shortness of breath, wheezing, sleep disturbances due to breathing, snores loudly, respiratory pain, dyspnea on exertion, cough disturbing sleep and postural dyspnea.    Cardiovascular:  Negative for chest pain, dyspnea on exertion, palpitations, orthopnea, claudication, leg swelling, fingers/toes turn blue, hypertension, hypotension, syncope, history of heart murmur, chest pain on exertion, chest pain at rest, pacemaker, few scattered varicosities, leg pain, sleep disturbances due to breathing, tachycardia, light-headedness, exercise intolerance and edema.   Gastrointestinal:  Positive for diarrhea. Negative for heartburn, nausea, vomiting, abdominal pain, constipation, blood in stool, melena, rectal pain, bloating, hemorrhoids, bowel incontinence, jaundice, rectal bleeding, coffee ground emesis and change in stool.   Genitourinary:  Negative for bladder incontinence, dysuria, urgency, hematuria, flank pain, vaginal discharge, difficulty urinating, genital sores, dyspareunia, decreased libido, nocturia, voiding less frequently, arousal difficulty, abnormal vaginal bleeding, excessive menstruation, menstrual changes, hot flashes, vaginal dryness and postmenopausal bleeding.   Musculoskeletal:  Negative for myalgias, back pain, joint swelling, arthralgias, stiffness, muscle cramps, neck pain, bone pain, muscle weakness and fracture.   Skin:  Negative for nail changes, itching, poor wound healing, rash, hair changes, skin changes, acne, warts, poor wound healing, scarring, flaky skin, Raynaud's phenomenon, sensitivity to sunlight and skin thickening.   Neurological:  Negative  "for dizziness, tingling, tremors, speech change, seizures, loss of consciousness, weakness, light-headedness, numbness, headaches, disturbances in coordination, extremity numbness, memory loss, difficulty walking and paralysis.   Endo/Heme:  Negative for anemia, swollen glands and bruises/bleeds easily.   Psychiatric/Behavioral:  Negative for depression, hallucinations, memory loss, decreased concentration, mood swings and panic attacks.    Breast:  Negative for breast discharge, breast mass, breast pain and nipple retraction.   Endocrine:  Negative for altered temperature regulation, polyphagia, polydipsia, unwanted hair growth and change in facial hair.        Physical Exam:    /73  Pulse 100  Temp 98.6  F (37  C) (Oral)  Resp 16  Ht 1.651 m (5' 5\")  Wt 64.1 kg (141 lb 4.8 oz)  SpO2 98%  BMI 23.51 kg/m2    General: Alert non-toxic appearing female in no acute distress  HEENT: Normocephalic, atraumatic; PERRLA; no scleral icterus; oropharynx pink without lesions; neck supple without lymphadenopathy  Pulmonary: Lungs clear to auscultation, no increased work of breathing noted  Cardiac: Tachycardic, regular rhythm, S1S2, no clicks, murmurs, rubs, or gallops; bilateral lower extremities without edema, dorsalis pedis pulses 2+ bilaterally  GI: Normoactive bowel sounds x4 quadrants, abdomen soft, non-distended, and non-tender to palpation without masses or organomegaly  : Not indicated  Heme: Cervical, supraclavicular, and inguinal nodes without lymphadenopathy  MSK: Moves all extremities, no obvious muscle wasting  Neuro: No gross deficits, normal gait  Skin: Appropriate color for race, warm and dry, no rashes or lesions to unclothed skin  Psych: Pleasant and interactive, affect bright, makes appropriate eye contact, thought process linear    Labs:   3/22/2017   Hemoglobin 11.7 - 15.7 g/dL 12.6   Hematocrit 35.0 - 47.0 % 36.9   Platelet Count 150 - 450 10e9/L 226   Absolute Neutrophil 1.6 - 8.3 10e9/L " 2.7   Sodium 133 - 144 mmol/L 140   Potassium 3.4 - 5.3 mmol/L 3.6   Chloride 94 - 109 mmol/L 103   Carbon Dioxide 20 - 32 mmol/L 26   Urea Nitrogen 7 - 30 mg/dL 17   Creatinine 0.52 - 1.04 mg/dL 0.75   Calcium 8.5 - 10.1 mg/dL 9.0   Magnesium 1.6 - 2.3 mg/dL 1.7   Bilirubin Total 0.2 - 1.3 mg/dL 0.4   ALT 0 - 50 U/L 23   AST 0 - 45 U/L 18   Alkaline Phosphatase 40 - 150 U/L 104   Albumin 3.4 - 5.0 g/dL 3.8   Protein Total 6.8 - 8.8 g/dL 7.2   LD 81 - 234 U/L 155   Amylase 30 - 110 U/L 29 (A)   WBC 4.0 - 11.0 10e9/L 5.7    132      Assessment/Plan:  1) Recurrent stage IIIC bilateral ovarian cancer: Patient tolerating without dose limiting side effects. Proceed with cycle eleven niraparib. Reviewed signs and symptoms for when she should contact the clinic or seek additional care, including but not limited to fever, chills, inability to keep down food or fluids, nausea and vomiting not controlled with antiemetics, and diarrhea leading to dehydration. Patient to contact the clinic with any questions or concerns in the interim. Betzy Jones RN in for teaching and study follow up. Chantell Toledo in to discuss the GOLD survivorship study.  2) Genetic counseling: Testing has been performed and she is negative for mutations in BRCA1, BRCA2, EPCAM, MLH1, MSH2, MSH6, PMS2, PTEN, and TP53 genes  3) Health maintenance issues discussed include to continue following up with PCP for non-gynecologic concerns.  4) Patient verbalized understanding of and agreement with plan    A total of 20 minutes spent with patient, over 50% spent in counseling and coordination of care.    HAILE De Paz, FNP-C  Family Nurse Practitioner  Gynecologic Oncology  [facility]  Pager: [phone]

## 2017-03-22 NOTE — NURSING NOTE
"Odalys Nathan is a 56 year old female who presents for:  Chief Complaint   Patient presents with     Port Draw     Pt with hx of ovarian ca labs collected via portacath.      Oncology Clinic Visit     Ovarian Ca        Initial Vitals:  /73  Pulse 100  Temp 98.6  F (37  C) (Oral)  Resp 16  Ht 1.651 m (5' 5\")  Wt 64.1 kg (141 lb 4.8 oz)  SpO2 98%  BMI 23.51 kg/m2 Estimated body mass index is 23.51 kg/(m^2) as calculated from the following:    Height as of this encounter: 1.651 m (5' 5\").    Weight as of this encounter: 64.1 kg (141 lb 4.8 oz).. Body surface area is 1.71 meters squared. BP completed using cuff size: regular  No Pain (0) No LMP recorded. Patient has had a hysterectomy. Allergies and medications reviewed.     Medications: Medication refills not needed today.  Pharmacy name entered into Sp[employer]ulation:    [id number] - [place], [place] - [address] [facility] - [place], [place] - [address]  [facility] - [place], [place] - [address]    Comments: Vitals completed in lab.      7 minutes for nursing intake (face to face time)   Maryanne Garcia LPN        "

## 2017-03-22 NOTE — MR AVS SNAPSHOT
After Visit Summary   3/22/2017    Odalsy Nathan    MRN: [id number]           Patient Information     Date Of Birth          1960        Visit Information        Provider Department      3/22/2017 12:40 PM Patricia Rocha APRN CNP [facility]        Today's Diagnoses     Ovarian cancer, right (H)    -  1    Ovarian cancer, left (H)        Patient in cancer related research study        Encounter for long-term (current) use of medications           Follow-ups after your visit        Your next 10 appointments already scheduled     Apr 19, 2017  2:15 PM CDT   [facility] with  [facility]   [facility] ([facility])    [address] [place] [id number] [phone]            Apr 19, 2017  2:40 PM CDT   (Arrive by 2:25 PM)   Return Active Treatment with HAILE De Paz CNP   [facility] ([facility])    [address] [place] [id number] [phone]              Who to contact     If you have questions or need follow up information about today's clinic visit or your schedule please contact [facility] directly at [phone].  Normal or non-critical lab and imaging results will be communicated to you by [employer], letter or phone within 4 business days after the clinic has received the results. If you do not hear from us within 7 days, please contact the clinic through [employer] or phone. If you have a critical or abnormal lab result, we will notify you by phone as soon as possible.  Submit refill requests through [employer] or call your pharmacy and they will forward the refill request to us. Please allow 3 business days for your refill to be completed.          Additional Information About Your Visit        [employer] Information     [employer] gives you secure access to your electronic health record. If you see a  "primary care provider, you can also send messages to your care team and make appointments. If you have questions, please call your primary care clinic.  If you do not have a primary care provider, please call [phone] and they will assist you.        Care EveryWhere ID     This is your Care EveryWhere ID. This could be used by other organizations to access your [place] medical records  [id number]        Your Vitals Were     Pulse Temperature Respirations Height Pulse Oximetry BMI (Body Mass Index)    100 98.6  F (37  C) (Oral) 16 1.651 m (5' 5\") 98% 23.51 kg/m2       Blood Pressure from Last 3 Encounters:   03/22/17 134/73   02/23/17 134/84   01/25/17 147/67    Weight from Last 3 Encounters:   03/22/17 64.1 kg (141 lb 4.8 oz)   02/23/17 62.9 kg (138 lb 11.2 oz)   01/25/17 64 kg (141 lb)              We Performed the Following     Amylase          CBC with platelets differential     Comprehensive metabolic panel     EKG 12-lead, complete     GGT     Lactate Dehydrogenase     Magnesium     MD Instruction for Protocol 2     MD Instruction for Protocol     Nursing Communication 1     Routine UA with microscopic - No culture     Routine UA with microscopic     Treatment Conditions     Vital signs     Weigh patient          Today's Medication Changes          These changes are accurate as of: 3/22/17  5:05 PM.  If you have any questions, ask your nurse or doctor.               These medicines have changed or have updated prescriptions.        Dose/Directions    cyanocobalamin 1000 MCG/ML injection   Commonly known as:  VITAMIN B12   This may have changed:  when to take this   Used for:  B12 deficiency        Dose:  1 mL   Inject 1 mL (1,000 mcg) into the muscle every 30 days   Quantity:  1 mL   Refills:  11       magnesium oxide 400 MG tablet   Commonly known as:  MAG-OX   This may have changed:  when to take this   Used for:  Ovarian cancer, unspecified laterality (H)        Dose:  400 mg   Take 1 tablet " (400 mg) by mouth 2 times daily   Quantity:  90 tablet   Refills:  3       * study - niraparib 100 mg capsule   Commonly known as:  IDS# [id number]   This may have changed:  Another medication with the same name was added. Make sure you understand how and when to take each.   Used for:  Ovarian cancer, right (H), Ovarian cancer, left (H), Drug-induced neutropenia (H)   Changed by:  Patricia Rocha APRN CNP        Dose:  200 mg   Take 2 capsules (200 mg) by mouth every morning Take at the same time each day, preferably morning. Swallow whole and do NOT chew capsules. Food and water is permissible. First dose will be administered on site.   Quantity:  84 capsule   Refills:  0       * study - niraparib 100 mg capsule   Commonly known as:  IDS# [id number]   This may have changed:  Another medication with the same name was added. Make sure you understand how and when to take each.   Used for:  Ovarian cancer, right (H), Ovarian cancer, left (H), Drug-induced neutropenia (H)   Changed by:  Nurse  Oncology        Dose:  200 mg   Take 2 capsules (200 mg) by mouth every morning Take at the same time each day, preferably morning. Swallow whole and do NOT chew capsules. Food and water is permissible. First dose will be administered on site.   Quantity:  84 capsule   Refills:  0       * study - niraparib 100 mg capsule   Commonly known as:  IDS# [id number]   This may have changed:  Another medication with the same name was added. Make sure you understand how and when to take each.   Used for:  Ovarian cancer, right (H), Ovarian cancer, left (H), Drug-induced neutropenia (H)   Changed by:  Patricia Rocha APRN CNP        Dose:  200 mg   Take 2 capsules (200 mg) by mouth every morning Take at the same time each day, preferably morning. Swallow whole and do NOT chew capsules. Food and water is permissible. First dose will be administered on site.   Quantity:  84 capsule   Refills:  0       * study - niraparib 100 mg capsule   Commonly  known as:  IDS# [id number]   This may have changed:  Another medication with the same name was added. Make sure you understand how and when to take each.   Used for:  Ovarian cancer, right (H), Ovarian cancer, left (H), Drug-induced neutropenia (H)   Changed by:  Nga Yeung MD        Dose:  200 mg   Take 2 capsules (200 mg) by mouth every morning Take at the same time each day, preferably morning. Swallow whole and do NOT chew capsules. Food and water is permissible. First dose will be administered on site.   Quantity:  84 capsule   Refills:  0       * study - niraparib 100 mg capsule   Commonly known as:  IDS# [id number]   This may have changed:  You were already taking a medication with the same name, and this prescription was added. Make sure you understand how and when to take each.   Used for:  Ovarian cancer, right (H), Ovarian cancer, left (H)   Changed by:  Patricia Rocha APRN CNP        Dose:  200 mg   Take 2 capsules (200 mg) by mouth every morning Take at the same time each day, preferably morning. Swallow whole and do NOT chew capsules. Food and water is permissible. First dose will be administered on site.   Quantity:  84 capsule   Refills:  0       * Notice:  This list has 5 medication(s) that are the same as other medications prescribed for you. Read the directions carefully, and ask your doctor or other care provider to review them with you.         Where to get your medicines      Some of these will need a paper prescription and others can be bought over the counter.  Ask your nurse if you have questions.     Bring a paper prescription for each of these medications     study - niraparib 100 mg capsule                Primary Care Provider Office Phone # Fax #    Aubrie Airam Hester [phone]       [facility] [address]  [facility] [address]        Thank you!     Thank you for choosing [facility]  for your care. Our goal is always to provide  you with excellent care. Hearing back from our patients is one way we can continue to improve our services. Please take a few minutes to complete the written survey that you may receive in the mail after your visit with us. Thank you!             Your Updated Medication List - Protect others around you: Learn how to safely use, store and throw away your medicines at www.disposemymeds.org.          This list is accurate as of: 3/22/17  5:05 PM.  Always use your most recent med list.                   Brand Name Dispense Instructions for use    ASPIRIN PO      Take 325 mg by mouth daily       CALCIUM + D PO      Take 1 tablet by mouth daily.       cyanocobalamin 1000 MCG/ML injection    VITAMIN B12    1 mL    Inject 1 mL (1,000 mcg) into the muscle every 30 days       diphenoxylate-atropine 2.5-0.025 MG per tablet    LOMOTIL    60 tablet    Take 2 tablets by mouth 4 times daily as needed for diarrhea       ferrous sulfate 325 (65 FE) MG tablet    IRON    90 tablet    Take 1 tablet (325 mg) by mouth 3 times daily (with meals) Take with small amount of orange juice, do not take with calcium       HERBALS      daily       iohexol 140 MG/ML Soln solution    OMNIPAQUE    140 mL    Mix entire bottle (50ml) of contast with 600ml (20 ounces) of water and drink half 2 hrs prior to CT scan and half 1 hr prior to scan       LEVOTHYROXINE SODIUM PO      Take by mouth daily       LORazepam 1 MG tablet    ATIVAN    30 tablet    Take 1 tablet (1 mg) by mouth every 6 hours as needed (Anxiety, Nausea/Vomiting or Sleep)       magnesium oxide 400 MG tablet    MAG-OX    90 tablet    Take 1 tablet (400 mg) by mouth 2 times daily       METFORMIN HCL PO      Take 500 mg by mouth daily       * order for DME     12 each    Injection Supplies for Vitamin B12: 3cc syringes w/ 27 gauge needles, 1/2 inch length       * order for DME     3 each    Injection Supplies for Vitamin B12: 3cc syringes w/ 27 gauge needles, 1/2 inch length       potassium  chloride 20 MEQ Packet    KLOR-CON     Take 20 mEq by mouth daily       * study - niraparib 100 mg capsule    IDS# [id number]    84 capsule    Take 2 capsules (200 mg) by mouth every morning Take at the same time each day, preferably morning. Swallow whole and do NOT chew capsules. Food and water is permissible. First dose will be administered on site.       * study - niraparib 100 mg capsule    IDS# [id number]    84 capsule    Take 2 capsules (200 mg) by mouth every morning Take at the same time each day, preferably morning. Swallow whole and do NOT chew capsules. Food and water is permissible. First dose will be administered on site.       * study - niraparib 100 mg capsule    IDS# [id number]    84 capsule    Take 2 capsules (200 mg) by mouth every morning Take at the same time each day, preferably morning. Swallow whole and do NOT chew capsules. Food and water is permissible. First dose will be administered on site.       * study - niraparib 100 mg capsule    IDS# [id number]    84 capsule    Take 2 capsules (200 mg) by mouth every morning Take at the same time each day, preferably morning. Swallow whole and do NOT chew capsules. Food and water is permissible. First dose will be administered on site.       * study - niraparib 100 mg capsule    IDS# [id number]    84 capsule    Take 2 capsules (200 mg) by mouth every morning Take at the same time each day, preferably morning. Swallow whole and do NOT chew capsules. Food and water is permissible. First dose will be administered on site.       VITAMIN C PO      Take 500 mg by mouth daily       VITAMIN D PO      Take 1,000 Units by mouth daily Unknown dose       VITAMIN E NATURAL PO      Take 100 Units by mouth daily       * Notice:  This list has 7 medication(s) that are the same as other medications prescribed for you. Read the directions carefully, and ask your doctor or other care provider to review them with you.

## 2017-03-22 NOTE — LETTER
3/22/2017       RE: Odalys Nathan  [id number]  [facility] [id number]     Dear Colleague,    Thank you for referring your patient, Odalys Nathan, to the [facility]. Please see a copy of my visit note below.    Gynecologic Oncology Follow-Up Note  RE: Odalys Nathan  MRN: [id number]  : 1960  Date of Visit: 2017    CC: Odalys Nathan is a 56 year old year old female with recurrent stage IIIC bilateral ovarian cancer who presents today for follow up regarding disease management while on the TESARO niraparib trial.    HPI: Odalys comes to the clinic accompanied by her partner Marcos. She continues to feel well on niraparib but does note increasing fatigue. She thinks this could be due to her hypothyroidism and plans to follow up with her PCP regarding this. She has continued to take ferrous sulfate 325mg TID with resolution of her anemia- she is tolerating this without side effects. She reports taking iron has improved her baseline diarrhea. She continues with chronic tinnitus she has had since she was a child, denies any worsening of this. She notes anxiety regarding her disease process but states she has a good support system and denies need for further intervention. She self reports an ECOG score of 1. She is eating and drinking well and reports feeling ready to continue with niraparib.       Brief Oncology History:  2012 - Admitted to hospital for 2 weeks of intermittent abdominal cramping, distention, diarrhea and N/V. CT of abdomen/pelvis significant for small bowel obstruction, a heterogenous soft tissue density in the pelvis, omental nodules, and ascites. Bilateral adnexal masses per U/S of pelvis. CA-125 was elevated at 987, CEA was normal at 1.0.    12 - Therapeutic paracentesis (4 L) with cytology confirming malignancy (GA positive, weak ER positive, CK-7 positive consistent with GYN primary). Surgery recommended d/t potential for  falling blood counts 2/2 chemotherapy (patient is Yazdanism and limiting blood transfusion)    2/1/12 - Exploratory laparotomy, MAR BSO, lysis of adhesion, Appendectomy, Repair cystotomy, Omentectomy Nxkd-sysgzt-nchnogrnv-transverse-colon resection, Ileo-descending colon anastomosis, CUSA, & Colonoscopy (done by Dr. Amato and colorectal team).  2/20/2012 - Admitted to hospital for bilateral pulmonary emboli and drainage of pleural effusion. Started on Lovenox.  2/28/12-3/21/12: Cycle #1-2 Carbo/Taxol IV  3/29/12 - Started on Keflex by her PCP for infection in her healing wound (immediately below her umbilicus).    4/11/12-6/14/12: Cycle #3-6 IV chemo, Cycle #1 IV/IP chemo  7/16/12 -  8, CT PRADEEP - enrolled in  - observation arm  3/4/13-6/28/13:  6, 9, 12, 15, 20.  7/1/13: CT Chest, Abdomen, Pelvis IMPRESSION:  1. Worsening metastatic ovarian carcinoma suggested by increased size of soft tissue nodules anterior to the right psoas muscle, that may represent growing mesenteric lymphadenopathy.  2. Remaining prominent right lower quadrant mesenteric lymph nodes are not significantly changed from CT 7/16/2012.  3. Clustered nodular opacities in the right lower lobe are not significant change from 7/16/2012 and remain indeterminate. Again, the appearance and distribution is suggestive of an infectious etiology.  4. Stable 8 mm soft tissue nodule in left breast, unchanged since at least 02/20/2012. This can be observed on followup studies, but correlation with mammography could be considered.  Decision made to start Doxil/Carbo.  7/11/13: The left ventricular ejection fraction is normal at 66.4%.  7/12/13-9/6/13: Cycle #1-3 Doxil/Carbo.  10, 11.    9/30/13 CT C/A/P Impression:  1. Overall, favorable response to treatment with decreasing size of soft tissue nodules tracking along the anterior aspect of the right psoas muscle.  2. 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  3. Improved  cluster of right lower lobe pulmonary nodular opacities. These may represent resolving infection.  10/3/13-12/9/13:  9, 8, 10. Cycle 4-6 Doxil/Carbo.    1/13/14 CT C/A/P Impression:   1. Stable appearance of metastatic ovarian cancer. Scattered soft tissue nodules along the anterior aspect of the right psoas muscle are unchanged in size. Mild mesenteric lymphadenopathy is unchanged.  2. Clustered micronodules in the right lower lobe are unchanged from 9/30/2013, but improved from 7/1/2013. This history suggests a postinflammatory/postinfectious etiology.  3. 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  1/16/14 Discussed multiple options for her based on relatively stable-appearing disease on CT but slight increase in  (which has had small increase to 20 with last recurrence) including chemo break with recheck of  in 1 month, starting new chemo agent immediately, and exploratory surgery with possible resection of nodules. She is considered platinum-sensitive based on > 1 year remission after Taxol/Carbo, which we will take into consideration for future chemo planning. I am not inclined to surgery at this time given difficulty she already has with diarrhea secondary to past colon resection. I suspect we would need to resect further bowel due to mesenteric disease. Also explained inherent risks of any major surgery. Also mentioned maintenance chemo, but this has not been shown to increase overall survival and would likely decrease her quality of life without significant benefit. Family is going on vacation to [place] in 2 weeks and Odalys does not want to have chemo prior to that, so will plan to take 1 month break. She can have  at that time (discussed checking toady since last draw was in early December, but as it would likely not change treatment plan and she has h/o slow rising , will not check today).  2/17/14  16    2/20/14: Decision to take break from chemo for two months,  "followed by CT and CA-125.    4/21/14  27  4/21/14 CT C/A/P Impression:    1. Increased size of 2 low-attenuation lymph nodes anterior to the right psoas muscle is concerning for worsening metastatic ovarian cancer.    2. New circumferential thickening of a 3.8 cm length segment of distal transverse colon is likely physiologic. Recommend attention on followup imaging.    3. Grossly unchanged size of clustered small nodules versus scarring in the right lower lobe the lungs.    4. Stable thrombosis of the right ovarian vein.  5/14/14: Diagnostic laparoscopy converted to exploratory laparotomy and removal of mesenteric masses, tumor debulking, peritoneal biopsies and intraperitoneal port placement. On laparoscopy, it was noted that there were small nodules on the anterior abdominal wall near the previous incision, small nodules on the right pelvic sidewall as well. Nodules were palpated in the mesentery; however, as it was unable to clarify where the origin of the nodules was, the decision was made to open the patient. On opening there was found to be approximately a 3 cm nodule in the small bowel mesentery and another separate approximately 2 cm nodule in the bowel mesentery. Pelvis without evidence of cancer, some mesenteric lymph nodes were palpated. No evidence otherwise of any disseminated cancer throughout the abdomen.    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 (2/2)  -Largest metastasis: 1.3 cm  -See comment  D: Peritoneum, right paracolic gutter #2, biopsy:  -Fibroadipose tissue with granulomatous inflammation surrounding refractile material  -Negative for " "malignancy   E: Small bowel adhesion, biopsy:  -Fibroconnective tissue, consistent with adhesion  -Negative for malignancy  F: Lymph nodes, mesentry, not otherwise specified, excision:  -Two lymph nodes, negative for metastatic carcinoma (0/2)  G: Lymph node, mesentery, \"#2\", excision:  -One lymph node, negative for metastatic carcinoma (0/1)  H: Lymph nodes, mesentery, \"nodule #2\", excision:  -Five lymph nodes, negative for metastatic carcinoma (0/5)  COMMENT:  Some of the specimens show post-operative changes. Others show possible treatment related changes, including necrosis. The metastatic carcinoma in the mesenteric lymph nodes (specimen C) shows variable morphology, including relatively low grade tumor with papillary architecture, and high grade tumor comprised of nests of tumor cells with irregular, slit-like spaces and marked nuclear pleomorphism.    5/29/14: Cycle 1 IV PACLitaxel / IP CISplatin / IP PACLitaxel.  - 28.  6/26/14: Cycle #2 IV/IP.  10  8/5/14: CT chest/abd/pelvis IMPRESSION     1. In this patient with ovarian cancer, overall findings are indicative of stable/slight improvement, as multiple mesenteric lymphadenopathy and scattered nodular peritoneal soft tissue mass lesions appear unchanged or slightly smaller since 4/21/2014.    2. 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 Mild dilatation of the second and the third portion of the duodenum with a narrow SMA angle. This could represent SMA syndrome, if clinically correlated.17/14:    Cycle #3 IV/IP.  10.    CT chest/abd/pelvis with contrast on 8/5/14    Impression:    1. In this patient with ovarian cancer, overall findings are indicative of stable/slight improvement, as multiple mesenteric lymphadenopathy and scattered nodular peritoneal soft tissue mass lesions appear unchanged or slightly smaller since 4/21/2014.    2. 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 Mild dilatation of the " second and the third portion of the duodenum with a narrow SMA angle. This could represent SMA syndrome, if clinically correlated.  8/7/14: Cycle #4 Taxol/Carbo (changed from IV/IP).  10. She has been feeling okay. She is unsure if she can finish out the course of 6 cycles IV/IP taxol/cisplatin. She feels like she has the flu for about a week then starts feeling gradually better after each chemo cycle. Her spouse notes that she actually has been more sick with the treatments than she initially admits here. She was also previously having some rib pain. Denies any rib pain now. Denies any chest pain or shortness of breath.  Plan: discussed recent CT cap results and switching to just IV as she is feeling miserable with IP treatments. Switch to IV carbo/taxol as patient is platinum sensitive.  We also discussed her taking part of the tesaro trial, which would require BRCA testing. She would like to take part in this trial if eligible.  8/20/14: Remove Intraperitoneal Port ( Port and catheter intact - discarded)  8/28/14: Cycle #5 Taxol/Carbo held due to thrombocytopenia.  6.    She denies any vaginal bleeding, no changes in her bowel or bladder habits, no nausea/emesis, no lower extremity edema, and no difficulties eating or sleeping. She denies any abdominal discomfort/bloating, no fevers or chills, and no chest pain or shortness of breath. She states her diarrhea is the same. She reports some fatigue which improves about 1-2 weeks after her chemotherapy. She states she does not need any medication refills and she was told she does not meet the criteria for the TESARO trial. She states she has 3 bags of iv fluids left over from her previous chemotherapy and will give these to herself. She states she is ready for her treatment today.    9/29/14: Cycle #6 Taxol/Carbo  6. Insurance questions regarding GSF coverage today. No concerns other than fatigue. Taking iron for anemia and does not desire blood  transfusion. Using neulasta for neutropenia. Using home IV hydration if needed. Baseline unchanged. No abdominal bloating, constipation, diarrhea, pain, vaginal or rectal bleeding, cough or dyspnea, fluid retention.    10/16/14: Impression:    1. Nodular peritoneal soft tissue mass in the right lower quadrant adjacent to the psoas muscle is no longer appreciated. Adjacent prominent lymphadenopathy is unchanged from previous exam. No new peritoneal lesions.    2. 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    10/20/14:  5. CT chest/abdomen/pelvis on 10/16/14 showed nodular peritoneal soft tissue mass in the right lower quadrant adjacent to the psoas muscle is no longer appreciated. Adjacent prominent lymphadenopathy is unchanged from previous exam. 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  1/27/15:  6.  4/28/15:  14.  5/26/15:  18.  6/2/15: CT cap Impression:  1. Postsurgical changes of hysterectomy and bilateral salpingo-oophorectomy for ovarian cancer. There is a new 8 mm hazy, ill-defined hypoattenuating lesion in hepatic segment 6 which is suspicious for a metastatic deposit. Further evaluation with ultrasound in recommended.    2. Increased size of a left retroperitoneal lymph node which is indeterminate but may represent a ciro metastasis. Mildly prominent lymph nodes in the right lower quadrant are not significantly changed.  3. Moderate colonic stool burden.    6/4/15: US abdomen IMPRESSION:    Hyperechoic lesion in the right hepatic lobe, consistent with hemangioma. This does not corresponds to the area of the lesion seen on CT from 6/2/2015. An MR would be helpful for identifying and characterizing the lesion from the recent CT.  6/12/15: MR abd IMPRESSION:  1. New 20 x 11 mm enhancing lesions between the right obliques, concerning for metastatic disease. This lesions should be amenable to percutaneous biopsy, if  indicated.  2. Correlating to the lesion visualized on comparison CT is a hepatic segment 6 subcapsular 7 mm lesion. Overall the appearance favors the diagnosis of a simple cyst. However, there is faint suggestion of mild peripheral arterial enhancement. Although this is favored as  artifactual, this should be followed up to confirm stability. Recommend 6 month followup.  3. Hepatic segment 6, 5 mm lesion too small to technically characterize. Differential would favor FNH, less likely flash filling hemangioma. Recommend attention on followup.  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     9/1/15: Cycle #1 Avastin/Cytoxan.   31.     9/24/15:feeling generally well. She says she has been having back and stomach spasms. She is taking cytosine daily (she ran out yesterday). She also says its affecting her voice. Admits that it burns occasionally. She is eating and drinking normal. She also admit diarrhea, 5-7 times daily, lose/watery. She trying to stay hydrated and eat fiber. She also says that her body is sore, especially the bottom of her feet. Her blood pressure is normal. She also admits having headache after her first infusion.      9/24/15: Cycle #2 Avastin/Cytoxan.  19.  10/15/15: Cycle #3 Avastin/Cytoxan.  16.     11/6/15: CT c/a/p IMPRESSION:    1. Stable postoperative change of MAR/BSO for ovarian cancer.  2. The lesion in the right flank abdominal musculature is slightly decreased in size. Otherwise, stable examination.  2. No evidence of metastatic disease in the chest.    11/20/15: Treatment planning visit,  16    11/25/15: surgical pathology report  FINAL DIAGNOSIS:  Soft tissue, right oblique muscle mass, excision:  -Recurrent ovarian serous carcinoma  -Carcinoma is present less than 1 mm from one resection margin  -Background skeletal muscle and  fibroadipose tissue    1/4/16:  22  1/11/16-1/27/16: Radiation to right flank x 12 treatments  4/7/2016:  94. CT cap IMPRESSION:    In this patient with ovarian cancer status post MAR/BSO and descending/transverse colectomy:  1. No evidence for malignancy in the chest, abdomen, or pelvis.  2. Stable small hypodense segment 6 liver lesion, appears more likely benign, possibly a cyst.  5/13/16:  124.  6/3/16: PET CT IMPRESSION: In this patient with a history of ovarian cancer:  1. Hypermetabolic and enlarging periaortic and perihepatic lymphadenopathy compatible with metastatic disease, as detailed above.  2. Although hypodense lesion in hepatic segment 6 has been present since 6/2/2015 associated hypermetabolism makes this lesion highly concerning for metastatic disease.    Plan: to start Niraparib under TESARO study.     6/9/16:  137.  6/14/16: Cycle #1 Niraparib.    6/28/16: Cycle #1 D15 Niraparib.    7/5/16:  100.    7/11/16: Cycle #2 Nraparib.   83.    8/3/2016: PET CT IMPRESSION:    In this patient with known history of ovarian cancer:  1) New pleural based nodular opacities in the lateral and inferior aspects of the bilateral lower lobes, worse in the left lung. Likely infection. Close follow up is recommended.      2) Slight decrease in hypermetabolic abdominal lymphadenopathy. 2 hypermetabolic lymph nodes persist.  3) Unchanged right hepatic lobe metastatic lesion.     8/9/16: Cycle #3 Niraparib.  69.  9/6/16: Cycle #4 Niraparib.  53. Dose held due to anemia.  9/13/16: Eval for potential cycle 4 niraparib. Dose held due to anemia.  10/4/16: CT CAP impression:  IMPRESSION: In this patient with a known history of ovarian cancer:  1. There has been interval resolution of pleural-based nodular  opacities which likely represented infection.  2. Abdominal lymphadenopathy in the form of 2 portacaval lymph nodes  have not significantly changed in size, noted to be  hypermetabolic on  prior PET/CT.  3. Previously demonstrated metastatic lesion in the right lobe of the  liver is not significantly changed.  10/11/16:  60  11/1/16: C6 niraparib,  75  11/29/16: C7 niraparib.  78. CT CAP impression as follows:  Target lesions (RECIST criteria):       A previously described target lesion superior to the head of the  pancreas (series 2, image 64)  (referred to as a perihepatic node on  6/3/2016) may not be a valid target lesion because it measured less  than 1.5 cm originally. However, this particular node has decreased in  size, now measuring 7 mm in short axis versus 14 mm on 6/3/2016 when  measured in a similar fashion.       2.3 cm short axis portacaval lymph node on series 2 image 67,  previously 2.0 cm on 10/4/2016.       1.2 cm subtle hypodensity in hepatic segment 6 on series 2 image 75,  stable on multiple studies since at least 6/3/2016     Sum of diameters today: 3.5 cm. Sum of diameters 10/4/2016: 3.2 cm.  Growth = 9%.    12/27/16: C8 niraparib.  105.  1/25/17: C9 niraparib.  108.  2/23/17: C10 niraparib.  132.   CT CAP impression:  Sum of target lesion diameters today: 3.7 cm. Sum of target lesion  diameters on 11/28/2016: 3.5 cm. Growth= 6%  1. In this patient with history of ovarian cancer there is stable  disease by RECIST criteria as evidenced by:   1a. Mildly increased size of liver metastasis.  1b. Stable portacaval lymphadenopathy.  1c. No evidence of metastatic disease in the chest.  2. Trace emphysematous changes of the lungs.  3/22/17: C11 niraparib.  132.      Past Medical History:   Diagnosis Date     Antiplatelet or antithrombotic long-term use      Ascites      Blood clot in the legs      Diabetes (H)      Ovarian cancer (H)     serous,stg IV     Pleural effusion      Pulmonary embolism (H) 2/2012     Refusal of blood transfusions as patient is Rastafarian      Short gut syndrome      Subclinical hypothyroidism  4/18/2013     Thrombosis of leg        Past Surgical History:   Procedure Laterality Date     COLECTOMY       COLONOSCOPY  2/1/2012    Procedure:COLONOSCOPY; With Biopsy; Surgeon:TONI SULLIVAN; Location:[facility] OR     HYSTERECTOMY TOTAL ABD, RHIANNA SALPINGO-OOPHORECTOMY, NODE DISSECTION, TUMOR DEBULKING, COMBINED  2/1/2012    Procedure:COMBINED HYSTERECTOMY TOTAL ABDOMINAL, BILATERAL SALPINGO-OOPHORECTOMY, NODE DISSECTION, TUMOR DEBULKING;  Exploratory Laparotomy, Total Abdominal Hysterectomy, Bilateral Salpingo-Oophorectomy, appendectomy,lysis of adhesions, ileal, ascending, transverse and splenic flexure resection, ileal descending bowel renanastomosis, incidental cystotomy repair, CUSA procedure and colonoscopy ; [facility]     INSERT PORT PERITONEAL ACCESS  4/3/2012    Procedure:INSERT PORT PERITONEAL ACCESS; Intraperitoneal Port Placement (c-arm); Surgeon:SAMUEL CARRASCO; Location:[facility] OR     INSERT PORT PERITONEAL ACCESS  5/14/2014    Procedure: INSERT PORT PERITONEAL ACCESS;  Surgeon: Nga Yeung MD;  Location: [facility] OR     INSERT PORT VASCULAR ACCESS       LAPAROSCOPY DIAGNOSTIC (GYN)  5/14/2014    Procedure: LAPAROSCOPY DIAGNOSTIC (GYN);  Surgeon: Nga Yeung MD;  Location: [facility] OR     LAPAROTOMY EXPLORATORY Right 11/25/2015    Procedure: LAPAROTOMY EXPLORATORY;  Surgeon: Nga Yeung MD;  Location: [facility] OR     LAPAROTOMY, TUMOR DEBULKING, COMBINED  5/14/2014    Procedure: COMBINED LAPAROTOMY, TUMOR DEBULKING;  Surgeon: Nga Yeung MD;  Location: [facility] OR     REMOVE CATHETER PERITONEAL N/A 8/20/2014    Procedure: REMOVE CATHETER PERITONEAL;  Surgeon: Nga Yeung MD;  Location: [facility] OR     VASCULAR SURGERY      stent left iliac vein       Current Outpatient Prescriptions   Medication     study - niraparib (IDS# 4759) 100 mg capsule     ferrous sulfate (IRON) 325 (65 FE) MG tablet     study - niraparib (IDS# 4759) 100 mg capsule     study - niraparib (IDS# 4759) 100 mg capsule      study - niraparib (IDS# 4759) 100 mg capsule     LEVOTHYROXINE SODIUM PO     iohexol (OMNIPAQUE) 140 MG/ML SOLN     order for DME     LORazepam (ATIVAN) 1 MG tablet     METFORMIN HCL PO     diphenoxylate-atropine (LOMOTIL) 2.5-0.025 MG per tablet     cyanocobalamin (VITAMIN B12) 1000 MCG/ML injection     ORDER FOR DME     magnesium oxide (MAG-OX) 400 MG tablet     ASPIRIN PO     potassium chloride (KLOR-CON) 20 MEQ packet     VITAMIN E NATURAL PO     Cholecalciferol (VITAMIN D PO)     HERBALS     Ascorbic Acid (VITAMIN C PO)     Calcium Carbonate-Vitamin D (CALCIUM + D PO)     No current facility-administered medications for this visit.      Facility-Administered Medications Ordered in Other Visits   Medication     heparin 100 UNIT/ML injection 5 mL     sodium chloride (PF) 0.9% PF flush 20 mL     heparin 100 UNIT/ML injection 500 Units          Allergies   Allergen Reactions     Nkda [No Known Drug Allergies]        Family History   Problem Relation Age of Onset     CANCER Mother 69     lung, smoker     CANCER Maternal Uncle 65     brain     Colon Cancer Maternal Aunt 80     colon       Social History     Social History     Marital status:      Spouse name: N/A     Number of children: N/A     Years of education: N/A     Occupational History     Not on file.     Social History Main Topics     Smoking status: Former Smoker     Years: 8.00     Types: Cigarettes     Quit date: 6/21/1980     Smokeless tobacco: Never Used      Comment: started at 9 yo and quit at 18 yo     Alcohol use Yes      Comment: 3x/day wine or jodi     Drug use: No     Sexual activity: Not on file     Other Topics Concern     Not on file     Social History Narrative       Review of Systems     Constitutional:  Positive for fatigue. Negative for fever, chills, weight loss, weight gain, decreased appetite, night sweats, recent stressors, height gain, height loss, post-operative complications, incisional pain, hallucinations, increased  energy, hyperactivity and confused.   HENT:  Positive for tinnitus. Negative for ear pain, hearing loss, nosebleeds, trouble swallowing, hoarse voice, mouth sores, sore throat, ear discharge, tooth pain, gum tenderness, taste disturbance, smell disturbance, hearing aid, bleeding gums, dry mouth, sinus pain, sinus congestion and neck mass.    Eyes:  Negative for double vision, pain, redness, eye pain, decreased vision, eye watering, eye bulging, eye dryness, flashing lights, spots, floaters, strabismus, tunnel vision, jaundice and eye irritation.   Respiratory:   Negative for cough, hemoptysis, sputum production, shortness of breath, wheezing, sleep disturbances due to breathing, snores loudly, respiratory pain, dyspnea on exertion, cough disturbing sleep and postural dyspnea.    Cardiovascular:  Negative for chest pain, dyspnea on exertion, palpitations, orthopnea, claudication, leg swelling, fingers/toes turn blue, hypertension, hypotension, syncope, history of heart murmur, chest pain on exertion, chest pain at rest, pacemaker, few scattered varicosities, leg pain, sleep disturbances due to breathing, tachycardia, light-headedness, exercise intolerance and edema.   Gastrointestinal:  Positive for diarrhea. Negative for heartburn, nausea, vomiting, abdominal pain, constipation, blood in stool, melena, rectal pain, bloating, hemorrhoids, bowel incontinence, jaundice, rectal bleeding, coffee ground emesis and change in stool.   Genitourinary:  Negative for bladder incontinence, dysuria, urgency, hematuria, flank pain, vaginal discharge, difficulty urinating, genital sores, dyspareunia, decreased libido, nocturia, voiding less frequently, arousal difficulty, abnormal vaginal bleeding, excessive menstruation, menstrual changes, hot flashes, vaginal dryness and postmenopausal bleeding.   Musculoskeletal:  Negative for myalgias, back pain, joint swelling, arthralgias, stiffness, muscle cramps, neck pain, bone pain,  "muscle weakness and fracture.   Skin:  Negative for nail changes, itching, poor wound healing, rash, hair changes, skin changes, acne, warts, poor wound healing, scarring, flaky skin, Raynaud's phenomenon, sensitivity to sunlight and skin thickening.   Neurological:  Negative for dizziness, tingling, tremors, speech change, seizures, loss of consciousness, weakness, light-headedness, numbness, headaches, disturbances in coordination, extremity numbness, memory loss, difficulty walking and paralysis.   Endo/Heme:  Negative for anemia, swollen glands and bruises/bleeds easily.   Psychiatric/Behavioral:  Negative for depression, hallucinations, memory loss, decreased concentration, mood swings and panic attacks.    Breast:  Negative for breast discharge, breast mass, breast pain and nipple retraction.   Endocrine:  Negative for altered temperature regulation, polyphagia, polydipsia, unwanted hair growth and change in facial hair.        Physical Exam:    /73  Pulse 100  Temp 98.6  F (37  C) (Oral)  Resp 16  Ht 1.651 m (5' 5\")  Wt 64.1 kg (141 lb 4.8 oz)  SpO2 98%  BMI 23.51 kg/m2    General: Alert non-toxic appearing female in no acute distress  HEENT: Normocephalic, atraumatic; PERRLA; no scleral icterus; oropharynx pink without lesions; neck supple without lymphadenopathy  Pulmonary: Lungs clear to auscultation, no increased work of breathing noted  Cardiac: Tachycardic, regular rhythm, S1S2, no clicks, murmurs, rubs, or gallops; bilateral lower extremities without edema, dorsalis pedis pulses 2+ bilaterally  GI: Normoactive bowel sounds x4 quadrants, abdomen soft, non-distended, and non-tender to palpation without masses or organomegaly  : Not indicated  Heme: Cervical, supraclavicular, and inguinal nodes without lymphadenopathy  MSK: Moves all extremities, no obvious muscle wasting  Neuro: No gross deficits, normal gait  Skin: Appropriate color for race, warm and dry, no rashes or lesions to " unclothed skin  Psych: Pleasant and interactive, affect bright, makes appropriate eye contact, thought process linear    Labs:   3/22/2017   Hemoglobin 11.7 - 15.7 g/dL 12.6   Hematocrit 35.0 - 47.0 % 36.9   Platelet Count 150 - 450 10e9/L 226   Absolute Neutrophil 1.6 - 8.3 10e9/L 2.7   Sodium 133 - 144 mmol/L 140   Potassium 3.4 - 5.3 mmol/L 3.6   Chloride 94 - 109 mmol/L 103   Carbon Dioxide 20 - 32 mmol/L 26   Urea Nitrogen 7 - 30 mg/dL 17   Creatinine 0.52 - 1.04 mg/dL 0.75   Calcium 8.5 - 10.1 mg/dL 9.0   Magnesium 1.6 - 2.3 mg/dL 1.7   Bilirubin Total 0.2 - 1.3 mg/dL 0.4   ALT 0 - 50 U/L 23   AST 0 - 45 U/L 18   Alkaline Phosphatase 40 - 150 U/L 104   Albumin 3.4 - 5.0 g/dL 3.8   Protein Total 6.8 - 8.8 g/dL 7.2   LD 81 - 234 U/L 155   Amylase 30 - 110 U/L 29 (A)   WBC 4.0 - 11.0 10e9/L 5.7    132      Assessment/Plan:  1) Recurrent stage IIIC bilateral ovarian cancer: Patient tolerating without dose limiting side effects. Proceed with cycle eleven niraparib. Reviewed signs and symptoms for when she should contact the clinic or seek additional care, including but not limited to fever, chills, inability to keep down food or fluids, nausea and vomiting not controlled with antiemetics, and diarrhea leading to dehydration. Patient to contact the clinic with any questions or concerns in the interim. Betzy Jones RN in for teaching and study follow up. Chantell Toledo in to discuss the GOLD survivorship study.  2) Genetic counseling: Testing has been performed and she is negative for mutations in BRCA1, BRCA2, EPCAM, MLH1, MSH2, MSH6, PMS2, PTEN, and TP53 genes  3) Health maintenance issues discussed include to continue following up with PCP for non-gynecologic concerns.  4) Patient verbalized understanding of and agreement with plan    A total of 20 minutes spent with patient, over 50% spent in counseling and coordination of care.    HAILE De Paz, FNP-C  Family Nurse Practitioner  Gynecologic  Oncology  [facility]  Pager: [phone]            Labs reviewed, patsy for chemotherapy. Patricia BREWSTERC

## 2017-04-18 ASSESSMENT — ENCOUNTER SYMPTOMS
CHILLS: 0
ALTERED TEMPERATURE REGULATION: 0
DIZZINESS: 0
POSTURAL DYSPNEA: 0
EXTREMITY NUMBNESS: 0
COUGH: 0
SYNCOPE: 0
LEG SWELLING: 0
LEG PAIN: 0
NECK PAIN: 0
BOWEL INCONTINENCE: 0
PALPITATIONS: 0
STIFFNESS: 0
FLANK PAIN: 0
MUSCLE WEAKNESS: 0
DIARRHEA: 1
FEVER: 0
DOUBLE VISION: 0
EXERCISE INTOLERANCE: 0
HOT FLASHES: 0
NAUSEA: 0
BLOOD IN STOOL: 0
DECREASED CONCENTRATION: 0
POLYDIPSIA: 0
FATIGUE: 1
RESPIRATORY PAIN: 0
BREAST MASS: 0
SPEECH CHANGE: 0
SWOLLEN GLANDS: 0
COUGH DISTURBING SLEEP: 0
JOINT SWELLING: 0
BRUISES/BLEEDS EASILY: 0
DISTURBANCES IN COORDINATION: 0
DECREASED APPETITE: 0
WEIGHT GAIN: 0
SEIZURES: 0
EYE IRRITATION: 0
NERVOUS/ANXIOUS: 1
HALLUCINATIONS: 0
POLYPHAGIA: 0
RECTAL BLEEDING: 0
JAUNDICE: 0
DYSURIA: 0
PARALYSIS: 0
NAIL CHANGES: 0
WEAKNESS: 0
EYE REDNESS: 0
HOARSE VOICE: 0
WHEEZING: 0
WEIGHT LOSS: 0
SORE THROAT: 0
HEMOPTYSIS: 0
CLAUDICATION: 0
DEPRESSION: 0
HEARTBURN: 0
INCREASED ENERGY: 0
HYPERTENSION: 0
SLEEP DISTURBANCES DUE TO BREATHING: 0
MEMORY LOSS: 0
NECK MASS: 0
TACHYCARDIA: 0
SPUTUM PRODUCTION: 0
DECREASED LIBIDO: 0
TINGLING: 0
PANIC: 0
MYALGIAS: 0
LOSS OF CONSCIOUSNESS: 0
SNORES LOUDLY: 0
ORTHOPNEA: 0
MUSCLE CRAMPS: 0
ABDOMINAL PAIN: 0
NUMBNESS: 0
HEMATURIA: 0
TROUBLE SWALLOWING: 0
EYE PAIN: 0
SINUS CONGESTION: 0
BREAST PAIN: 0
TASTE DISTURBANCE: 0
BLOATING: 0
LIGHT-HEADEDNESS: 0
CONSTIPATION: 0
HYPOTENSION: 0
ARTHRALGIAS: 0
POOR WOUND HEALING: 0
DYSPNEA ON EXERTION: 0
SHORTNESS OF BREATH: 0
EYE WATERING: 0
BACK PAIN: 0
INSOMNIA: 1
HEADACHES: 0
TREMORS: 0
RECTAL PAIN: 0
SKIN CHANGES: 0
NIGHT SWEATS: 0
SINUS PAIN: 0
SMELL DISTURBANCE: 0
VOMITING: 0
DIFFICULTY URINATING: 0

## 2017-04-18 NOTE — PROGRESS NOTES
Gynecologic Oncology Follow-Up Note  RE: Odalys Nathan  MRN: [id number]  : 1960  Date of Visit: 2017    CC: Odalys Nathan is a 56 year old year old female with recurrent stage IIIC bilateral ovarian cancer who presents today for follow up regarding disease management while on the TESARO niraparib trial.    HPI: Odalys comes to the clinic accompanied by her  Marcos. She continues to feel well on niraparib but continues with fatigue. She is physically active and takes naps as needed to manage this, states fatigue is stable. She has continued to take ferrous sulfate 325mg TID with resolution of her anemia- she is tolerating this without side effects. She reports taking iron has improved her baseline diarrhea. She continues with chronic tinnitus she has had since she was a child, denies any worsening of this. She notes she previously had cloudy urine but has been taking two cranberry pills daily with improvement. She self reports an ECOG score of 1. She is eating and drinking well (states she has not drank a lot of fluid today though) and reports feeling ready to continue with niraparib. She is excited to return to [place] May 6th-.      Brief Oncology History:  2012 - Admitted to hospital for 2 weeks of intermittent abdominal cramping, distention, diarrhea and N/V. CT of abdomen/pelvis significant for small bowel obstruction, a heterogenous soft tissue density in the pelvis, omental nodules, and ascites. Bilateral adnexal masses per U/S of pelvis. CA-125 was elevated at 987, CEA was normal at 1.0.    12 - Therapeutic paracentesis (4 L) with cytology confirming malignancy (KY positive, weak ER positive, CK-7 positive consistent with GYN primary). Surgery recommended d/t potential for falling blood counts 2/2 chemotherapy (patient is Bahai and limiting blood transfusion)    12 - Exploratory laparotomy, MAR BSO, lysis of adhesion, Appendectomy, Repair  cystotomy, Omentectomy Pvod-jxhunr-prdkclnux-transverse-colon resection, Ileo-descending colon anastomosis, CUSA, & Colonoscopy (done by Dr. Amato and colorectal team).  2/20/2012 - Admitted to hospital for bilateral pulmonary emboli and drainage of pleural effusion. Started on Lovenox.  2/28/12-3/21/12: Cycle #1-2 Carbo/Taxol IV  3/29/12 - Started on Keflex by her PCP for infection in her healing wound (immediately below her umbilicus).    4/11/12-6/14/12: Cycle #3-6 IV chemo, Cycle #1 IV/IP chemo  7/16/12 -  8, CT PRADEEP - enrolled in  - observation arm  3/4/13-6/28/13:  6, 9, 12, 15, 20.  7/1/13: CT Chest, Abdomen, Pelvis IMPRESSION:  1. Worsening metastatic ovarian carcinoma suggested by increased size of soft tissue nodules anterior to the right psoas muscle, that may represent growing mesenteric lymphadenopathy.  2. Remaining prominent right lower quadrant mesenteric lymph nodes are not significantly changed from CT 7/16/2012.  3. Clustered nodular opacities in the right lower lobe are not significant change from 7/16/2012 and remain indeterminate. Again, the appearance and distribution is suggestive of an infectious etiology.  4. Stable 8 mm soft tissue nodule in left breast, unchanged since at least 02/20/2012. This can be observed on followup studies, but correlation with mammography could be considered.  Decision made to start Doxil/Carbo.  7/11/13: The left ventricular ejection fraction is normal at 66.4%.  7/12/13-9/6/13: Cycle #1-3 Doxil/Carbo.  10, 11.    9/30/13 CT C/A/P Impression:  1. Overall, favorable response to treatment with decreasing size of soft tissue nodules tracking along the anterior aspect of the right psoas muscle.  2. 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  3. Improved cluster of right lower lobe pulmonary nodular opacities. These may represent resolving infection.  10/3/13-12/9/13:  9, 8, 10. Cycle 4-6 Doxil/Carbo.    1/13/14 CT C/A/P Impression:    1. Stable appearance of metastatic ovarian cancer. Scattered soft tissue nodules along the anterior aspect of the right psoas muscle are unchanged in size. Mild mesenteric lymphadenopathy is unchanged.  2. Clustered micronodules in the right lower lobe are unchanged from 9/30/2013, but improved from 7/1/2013. This history suggests a postinflammatory/postinfectious etiology.  3. 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  1/16/14 Discussed multiple options for her based on relatively stable-appearing disease on CT but slight increase in  (which has had small increase to 20 with last recurrence) including chemo break with recheck of  in 1 month, starting new chemo agent immediately, and exploratory surgery with possible resection of nodules. She is considered platinum-sensitive based on > 1 year remission after Taxol/Carbo, which we will take into consideration for future chemo planning. I am not inclined to surgery at this time given difficulty she already has with diarrhea secondary to past colon resection. I suspect we would need to resect further bowel due to mesenteric disease. Also explained inherent risks of any major surgery. Also mentioned maintenance chemo, but this has not been shown to increase overall survival and would likely decrease her quality of life without significant benefit. Family is going on vacation to [place] in 2 weeks and Odalys does not want to have chemo prior to that, so will plan to take 1 month break. She can have  at that time (discussed checking toady since last draw was in early December, but as it would likely not change treatment plan and she has h/o slow rising , will not check today).  2/17/14  16    2/20/14: Decision to take break from chemo for two months, followed by CT and CA-125.    4/21/14  27  4/21/14 CT C/A/P Impression:    1. Increased size of 2 low-attenuation lymph nodes anterior to the right psoas muscle is concerning for  "worsening metastatic ovarian cancer.    2. New circumferential thickening of a 3.8 cm length segment of distal transverse colon is likely physiologic. Recommend attention on followup imaging.    3. Grossly unchanged size of clustered small nodules versus scarring in the right lower lobe the lungs.    4. Stable thrombosis of the right ovarian vein.  5/14/14: Diagnostic laparoscopy converted to exploratory laparotomy and removal of mesenteric masses, tumor debulking, peritoneal biopsies and intraperitoneal port placement. On laparoscopy, it was noted that there were small nodules on the anterior abdominal wall near the previous incision, small nodules on the right pelvic sidewall as well. Nodules were palpated in the mesentery; however, as it was unable to clarify where the origin of the nodules was, the decision was made to open the patient. On opening there was found to be approximately a 3 cm nodule in the small bowel mesentery and another separate approximately 2 cm nodule in the bowel mesentery. Pelvis without evidence of cancer, some mesenteric lymph nodes were palpated. No evidence otherwise of any disseminated cancer throughout the abdomen.    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 (2/2)  -Largest metastasis: 1.3 cm  -See comment  D: Peritoneum, right paracolic gutter #2, biopsy:  -Fibroadipose tissue with granulomatous inflammation surrounding refractile material  -Negative for malignancy   E: Small bowel adhesion, biopsy:  -Fibroconnective tissue, consistent with adhesion  -Negative for malignancy  F: Lymph nodes, mesentry, not otherwise specified, " "excision:  -Two lymph nodes, negative for metastatic carcinoma (0/2)  G: Lymph node, mesentery, \"#2\", excision:  -One lymph node, negative for metastatic carcinoma (0/1)  H: Lymph nodes, mesentery, \"nodule #2\", excision:  -Five lymph nodes, negative for metastatic carcinoma (0/5)  COMMENT:  Some of the specimens show post-operative changes. Others show possible treatment related changes, including necrosis. The metastatic carcinoma in the mesenteric lymph nodes (specimen C) shows variable morphology, including relatively low grade tumor with papillary architecture, and high grade tumor comprised of nests of tumor cells with irregular, slit-like spaces and marked nuclear pleomorphism.    5/29/14: Cycle 1 IV PACLitaxel / IP CISplatin / IP PACLitaxel.  - 28.  6/26/14: Cycle #2 IV/IP.  10  8/5/14: CT chest/abd/pelvis IMPRESSION     1. In this patient with ovarian cancer, overall findings are indicative of stable/slight improvement, as multiple mesenteric lymphadenopathy and scattered nodular peritoneal soft tissue mass lesions appear unchanged or slightly smaller since 4/21/2014.    2. 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 Mild dilatation of the second and the third portion of the duodenum with a narrow SMA angle. This could represent SMA syndrome, if clinically correlated.17/14:    Cycle #3 IV/IP.  10.    CT chest/abd/pelvis with contrast on 8/5/14    Impression:    1. In this patient with ovarian cancer, overall findings are indicative of stable/slight improvement, as multiple mesenteric lymphadenopathy and scattered nodular peritoneal soft tissue mass lesions appear unchanged or slightly smaller since 4/21/2014.    2. 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 Mild dilatation of the second and the third portion of the duodenum with a narrow SMA angle. This could represent SMA syndrome, if clinically correlated.  8/7/14: Cycle #4 Taxol/Carbo (changed from " IV/IP).  10. She has been feeling okay. She is unsure if she can finish out the course of 6 cycles IV/IP taxol/cisplatin. She feels like she has the flu for about a week then starts feeling gradually better after each chemo cycle. Her spouse notes that she actually has been more sick with the treatments than she initially admits here. She was also previously having some rib pain. Denies any rib pain now. Denies any chest pain or shortness of breath.  Plan: discussed recent CT cap results and switching to just IV as she is feeling miserable with IP treatments. Switch to IV carbo/taxol as patient is platinum sensitive.  We also discussed her taking part of the tesaro trial, which would require BRCA testing. She would like to take part in this trial if eligible.  8/20/14: Remove Intraperitoneal Port ( Port and catheter intact - discarded)  8/28/14: Cycle #5 Taxol/Carbo held due to thrombocytopenia.  6.    She denies any vaginal bleeding, no changes in her bowel or bladder habits, no nausea/emesis, no lower extremity edema, and no difficulties eating or sleeping. She denies any abdominal discomfort/bloating, no fevers or chills, and no chest pain or shortness of breath. She states her diarrhea is the same. She reports some fatigue which improves about 1-2 weeks after her chemotherapy. She states she does not need any medication refills and she was told she does not meet the criteria for the TESARO trial. She states she has 3 bags of iv fluids left over from her previous chemotherapy and will give these to herself. She states she is ready for her treatment today.    9/29/14: Cycle #6 Taxol/Carbo  6. Insurance questions regarding GSF coverage today. No concerns other than fatigue. Taking iron for anemia and does not desire blood transfusion. Using neulasta for neutropenia. Using home IV hydration if needed. Baseline unchanged. No abdominal bloating, constipation, diarrhea, pain, vaginal or rectal  bleeding, cough or dyspnea, fluid retention.    10/16/14: Impression:    1. Nodular peritoneal soft tissue mass in the right lower quadrant adjacent to the psoas muscle is no longer appreciated. Adjacent prominent lymphadenopathy is unchanged from previous exam. No new peritoneal lesions.    2. 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    10/20/14:  5. CT chest/abdomen/pelvis on 10/16/14 showed nodular peritoneal soft tissue mass in the right lower quadrant adjacent to the psoas muscle is no longer appreciated. Adjacent prominent lymphadenopathy is unchanged from previous exam. 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  1/27/15:  6.  4/28/15:  14.  5/26/15:  18.  6/2/15: CT cap Impression:  1. Postsurgical changes of hysterectomy and bilateral salpingo-oophorectomy for ovarian cancer. There is a new 8 mm hazy, ill-defined hypoattenuating lesion in hepatic segment 6 which is suspicious for a metastatic deposit. Further evaluation with ultrasound in recommended.    2. Increased size of a left retroperitoneal lymph node which is indeterminate but may represent a ciro metastasis. Mildly prominent lymph nodes in the right lower quadrant are not significantly changed.  3. Moderate colonic stool burden.    6/4/15: US abdomen IMPRESSION:    Hyperechoic lesion in the right hepatic lobe, consistent with hemangioma. This does not corresponds to the area of the lesion seen on CT from 6/2/2015. An MR would be helpful for identifying and characterizing the lesion from the recent CT.  6/12/15: MR abd IMPRESSION:  1. New 20 x 11 mm enhancing lesions between the right obliques, concerning for metastatic disease. This lesions should be amenable to percutaneous biopsy, if indicated.  2. Correlating to the lesion visualized on comparison CT is a hepatic segment 6 subcapsular 7 mm lesion. Overall the appearance favors the diagnosis of a simple cyst.  However, there is faint suggestion of mild peripheral arterial enhancement. Although this is favored as  artifactual, this should be followed up to confirm stability. Recommend 6 month followup.  3. Hepatic segment 6, 5 mm lesion too small to technically characterize. Differential would favor FNH, less likely flash filling hemangioma. Recommend attention on followup.  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     9/1/15: Cycle #1 Avastin/Cytoxan.   31.     9/24/15:feeling generally well. She says she has been having back and stomach spasms. She is taking cytosine daily (she ran out yesterday). She also says its affecting her voice. Admits that it burns occasionally. She is eating and drinking normal. She also admit diarrhea, 5-7 times daily, lose/watery. She trying to stay hydrated and eat fiber. She also says that her body is sore, especially the bottom of her feet. Her blood pressure is normal. She also admits having headache after her first infusion.      9/24/15: Cycle #2 Avastin/Cytoxan.  19.  10/15/15: Cycle #3 Avastin/Cytoxan.  16.     11/6/15: CT c/a/p IMPRESSION:    1. Stable postoperative change of MAR/BSO for ovarian cancer.  2. The lesion in the right flank abdominal musculature is slightly decreased in size. Otherwise, stable examination.  2. No evidence of metastatic disease in the chest.    11/20/15: Treatment planning visit,  16    11/25/15: surgical pathology report  FINAL DIAGNOSIS:  Soft tissue, right oblique muscle mass, excision:  -Recurrent ovarian serous carcinoma  -Carcinoma is present less than 1 mm from one resection margin  -Background skeletal muscle and fibroadipose tissue    1/4/16:  22  1/11/16-1/27/16: Radiation to right flank x 12 treatments  4/7/2016:  94. CT cap IMPRESSION:    In this patient with ovarian cancer  status post MAR/BSO and descending/transverse colectomy:  1. No evidence for malignancy in the chest, abdomen, or pelvis.  2. Stable small hypodense segment 6 liver lesion, appears more likely benign, possibly a cyst.  5/13/16:  124.  6/3/16: PET CT IMPRESSION: In this patient with a history of ovarian cancer:  1. Hypermetabolic and enlarging periaortic and perihepatic lymphadenopathy compatible with metastatic disease, as detailed above.  2. Although hypodense lesion in hepatic segment 6 has been present since 6/2/2015 associated hypermetabolism makes this lesion highly concerning for metastatic disease.    Plan: to start Niraparib under TESARO study.     6/9/16:  137.  6/14/16: Cycle #1 Niraparib.    6/28/16: Cycle #1 D15 Niraparib.    7/5/16:  100.    7/11/16: Cycle #2 Nraparib.   83.    8/3/2016: PET CT IMPRESSION:    In this patient with known history of ovarian cancer:  1) New pleural based nodular opacities in the lateral and inferior aspects of the bilateral lower lobes, worse in the left lung. Likely infection. Close follow up is recommended.      2) Slight decrease in hypermetabolic abdominal lymphadenopathy. 2 hypermetabolic lymph nodes persist.  3) Unchanged right hepatic lobe metastatic lesion.     8/9/16: Cycle #3 Niraparib.  69.  9/6/16: Cycle #4 Niraparib.  53. Dose held due to anemia.  9/13/16: Eval for potential cycle 4 niraparib. Dose held due to anemia.  10/4/16: CT CAP impression:  IMPRESSION: In this patient with a known history of ovarian cancer:  1. There has been interval resolution of pleural-based nodular  opacities which likely represented infection.  2. Abdominal lymphadenopathy in the form of 2 portacaval lymph nodes  have not significantly changed in size, noted to be hypermetabolic on  prior PET/CT.  3. Previously demonstrated metastatic lesion in the right lobe of the  liver is not significantly changed.  10/11/16:  60  11/1/16: C6 niraparib,   75  11/29/16: C7 niraparib.  78. CT CAP impression as follows:  Target lesions (RECIST criteria):       A previously described target lesion superior to the head of the  pancreas (series 2, image 64)  (referred to as a perihepatic node on  6/3/2016) may not be a valid target lesion because it measured less  than 1.5 cm originally. However, this particular node has decreased in  size, now measuring 7 mm in short axis versus 14 mm on 6/3/2016 when  measured in a similar fashion.       2.3 cm short axis portacaval lymph node on series 2 image 67,  previously 2.0 cm on 10/4/2016.       1.2 cm subtle hypodensity in hepatic segment 6 on series 2 image 75,  stable on multiple studies since at least 6/3/2016     Sum of diameters today: 3.5 cm. Sum of diameters 10/4/2016: 3.2 cm.  Growth = 9%.    12/27/16: C8 niraparib.  105.  1/25/17: C9 niraparib.  108.  2/23/17: C10 niraparib.  132.   CT CAP impression:  Sum of target lesion diameters today: 3.7 cm. Sum of target lesion  diameters on 11/28/2016: 3.5 cm. Growth= 6%  1. In this patient with history of ovarian cancer there is stable  disease by RECIST criteria as evidenced by:   1a. Mildly increased size of liver metastasis.  1b. Stable portacaval lymphadenopathy.  1c. No evidence of metastatic disease in the chest.  2. Trace emphysematous changes of the lungs.  3/22/17: C11 niraparib.  132.  4/19/17: C12 niraparib.  pending.          Past Medical History:   Diagnosis Date     Antiplatelet or antithrombotic long-term use      Ascites      Blood clot in the legs      Diabetes (H)      Ovarian cancer (H)     serous,stg IV     Pleural effusion      Pulmonary embolism (H) 2/2012     Refusal of blood transfusions as patient is Jainism      Short gut syndrome      Subclinical hypothyroidism 4/18/2013     Thrombosis of leg        Past Surgical History:   Procedure Laterality Date     COLECTOMY       COLONOSCOPY  2/1/2012     Procedure:COLONOSCOPY; With Biopsy; Surgeon:TONI SULLIVAN; Location:[facility]     HYSTERECTOMY TOTAL ABD, RHIANNA SALPINGO-OOPHORECTOMY, NODE DISSECTION, TUMOR DEBULKING, COMBINED  2/1/2012    Procedure:COMBINED HYSTERECTOMY TOTAL ABDOMINAL, BILATERAL SALPINGO-OOPHORECTOMY, NODE DISSECTION, TUMOR DEBULKING;  Exploratory Laparotomy, Total Abdominal Hysterectomy, Bilateral Salpingo-Oophorectomy, appendectomy,lysis of adhesions, ileal, ascending, transverse and splenic flexure resection, ileal descending bowel renanastomosis, incidental cystotomy repair, CUSA procedure and colonoscopy ; [facility]     INSERT PORT PERITONEAL ACCESS  4/3/2012    Procedure:INSERT PORT PERITONEAL ACCESS; Intraperitoneal Port Placement (c-arm); Surgeon:SAMUEL CARRASCO; Location:[facility]     INSERT PORT PERITONEAL ACCESS  5/14/2014    Procedure: INSERT PORT PERITONEAL ACCESS;  Surgeon: Nga eYung MD;  Location: [facility] OR     INSERT PORT VASCULAR ACCESS       LAPAROSCOPY DIAGNOSTIC (GYN)  5/14/2014    Procedure: LAPAROSCOPY DIAGNOSTIC (GYN);  Surgeon: Nga Yeung MD;  Location: [facility] OR     LAPAROTOMY EXPLORATORY Right 11/25/2015    Procedure: LAPAROTOMY EXPLORATORY;  Surgeon: Nga Yeung MD;  Location: [facility]     LAPAROTOMY, TUMOR DEBULKING, COMBINED  5/14/2014    Procedure: COMBINED LAPAROTOMY, TUMOR DEBULKING;  Surgeon: Nga Yeung MD;  Location: [facility]     REMOVE CATHETER PERITONEAL N/A 8/20/2014    Procedure: REMOVE CATHETER PERITONEAL;  Surgeon: Nga Yeung MD;  Location: [facility] OR     VASCULAR SURGERY      stent left iliac vein       Current Outpatient Prescriptions   Medication     study - niraparib (IDS# 4759) 100 mg capsule     study - niraparib (IDS# 4759) 100 mg capsule     ferrous sulfate (IRON) 325 (65 FE) MG tablet     study - niraparib (IDS# 4759) 100 mg capsule     study - niraparib (IDS# 4759) 100 mg capsule     study - niraparib (IDS# 4759) 100 mg capsule     LEVOTHYROXINE SODIUM PO     iohexol  (OMNIPAQUE) 140 MG/ML SOLN     order for DME     LORazepam (ATIVAN) 1 MG tablet     METFORMIN HCL PO     diphenoxylate-atropine (LOMOTIL) 2.5-0.025 MG per tablet     cyanocobalamin (VITAMIN B12) 1000 MCG/ML injection     ORDER FOR DME     magnesium oxide (MAG-OX) 400 MG tablet     ASPIRIN PO     potassium chloride (KLOR-CON) 20 MEQ packet     VITAMIN E NATURAL PO     Cholecalciferol (VITAMIN D PO)     HERBALS     Ascorbic Acid (VITAMIN C PO)     Calcium Carbonate-Vitamin D (CALCIUM + D PO)     No current facility-administered medications for this visit.      Facility-Administered Medications Ordered in Other Visits   Medication     heparin 100 UNIT/ML injection 5 mL     sodium chloride (PF) 0.9% PF flush 20 mL     heparin 100 UNIT/ML injection 500 Units          Allergies   Allergen Reactions     Nkda [No Known Drug Allergies]        Family History   Problem Relation Age of Onset     CANCER Mother 69     lung, smoker     CANCER Maternal Uncle 65     brain     Colon Cancer Maternal Aunt 80     colon       Social History     Social History     Marital status:      Spouse name: N/A     Number of children: N/A     Years of education: N/A     Occupational History     Not on file.     Social History Main Topics     Smoking status: Former Smoker     Years: 8.00     Types: Cigarettes     Quit date: 6/21/1980     Smokeless tobacco: Never Used      Comment: started at 11 yo and quit at 18 yo     Alcohol use Yes      Comment: 3x/day wine or jodi     Drug use: No     Sexual activity: Not on file     Other Topics Concern     Not on file     Social History Narrative       Review of Systems     Constitutional:  Positive for fatigue. Negative for fever, chills, weight loss, weight gain, decreased appetite, night sweats, recent stressors, height gain, height loss, post-operative complications, incisional pain, hallucinations, increased energy, hyperactivity and confused.   HENT:  Positive for tinnitus. Negative for ear  pain, hearing loss, nosebleeds, trouble swallowing, hoarse voice, mouth sores, sore throat, ear discharge, tooth pain, gum tenderness, taste disturbance, smell disturbance, hearing aid, bleeding gums, dry mouth, sinus pain, sinus congestion and neck mass.    Eyes:  Negative for double vision, pain, redness, eye pain, decreased vision, eye watering, eye bulging, eye dryness, flashing lights, spots, floaters, strabismus, tunnel vision, jaundice and eye irritation.   Respiratory:   Negative for cough, hemoptysis, sputum production, shortness of breath, wheezing, sleep disturbances due to breathing, snores loudly, respiratory pain, dyspnea on exertion, cough disturbing sleep and postural dyspnea.    Cardiovascular:  Negative for chest pain, dyspnea on exertion, palpitations, orthopnea, claudication, leg swelling, fingers/toes turn blue, hypertension, hypotension, syncope, history of heart murmur, chest pain on exertion, chest pain at rest, pacemaker, few scattered varicosities, leg pain, sleep disturbances due to breathing, tachycardia, light-headedness, exercise intolerance and edema.   Gastrointestinal:  Positive for diarrhea. Negative for heartburn, nausea, vomiting, abdominal pain, constipation, blood in stool, melena, rectal pain, bloating, hemorrhoids, bowel incontinence, jaundice, rectal bleeding, coffee ground emesis and change in stool.   Genitourinary:  Negative for bladder incontinence, dysuria, urgency, hematuria, flank pain, vaginal discharge, difficulty urinating, genital sores, dyspareunia, decreased libido, nocturia, voiding less frequently, arousal difficulty, abnormal vaginal bleeding, excessive menstruation, menstrual changes, hot flashes, vaginal dryness and postmenopausal bleeding.   Musculoskeletal:  Negative for myalgias, back pain, joint swelling, arthralgias, stiffness, muscle cramps, neck pain, bone pain, muscle weakness and fracture.   Skin:  Negative for nail changes, itching, poor wound  healing, rash, hair changes, skin changes, acne, warts, poor wound healing, scarring, flaky skin, Raynaud's phenomenon, sensitivity to sunlight and skin thickening.   Neurological:  Negative for dizziness, tingling, tremors, speech change, seizures, loss of consciousness, weakness, light-headedness, numbness, headaches, disturbances in coordination, extremity numbness, memory loss, difficulty walking and paralysis.   Endo/Heme:  Negative for anemia, swollen glands and bruises/bleeds easily.   Psychiatric/Behavioral:  Negative for depression, hallucinations, memory loss, decreased concentration, mood swings and panic attacks.    Breast:  Negative for breast discharge, breast mass, breast pain and nipple retraction.   Endocrine:  Negative for altered temperature regulation, polyphagia, polydipsia, unwanted hair growth and change in facial hair.        Physical Exam:    /61 (BP Location: Right arm, Patient Position: Chair, Cuff Size: Adult Regular)  Pulse 105  Temp 98.2  F (36.8  C) (Oral)  Wt 64.8 kg (142 lb 12.8 oz)  SpO2 100%  BMI 23.76 kg/m2    General: Alert non-toxic appearing female in no acute distress  HEENT: Normocephalic, atraumatic; PERRLA; no scleral icterus; oropharynx pink without lesions; neck supple without lymphadenopathy  Pulmonary: Lungs clear to auscultation, no increased work of breathing noted  Cardiac: Tachycardic, regular rhythm, S1S2, no clicks, murmurs, rubs, or gallops; bilateral lower extremities without edema  GI: Normoactive bowel sounds x4 quadrants, abdomen soft, non-distended, and non-tender to palpation without masses or organomegaly  : Not indicated  Heme: Cervical, supraclavicular, and inguinal nodes without lymphadenopathy  MSK: Moves all extremities, no obvious muscle wasting  Neuro: No gross deficits, normal gait  Skin: Appropriate color for race, warm and dry, no rashes or lesions to unclothed skin  Psych: Pleasant and interactive, affect bright, makes appropriate  eye contact, thought process linear    Labs:      4/19/2017   Hemoglobin 11.7 - 15.7 g/dL 11.7   Hematocrit 35.0 - 47.0 % 34.6 (A)   Platelet Count 150 - 450 10e9/L 226   Absolute Neutrophil 1.6 - 8.3 10e9/L 3.0   Sodium 133 - 144 mmol/L 141   Potassium 3.4 - 5.3 mmol/L 3.2 (A)   Chloride 94 - 109 mmol/L 105   Carbon Dioxide 20 - 32 mmol/L 26   Urea Nitrogen 7 - 30 mg/dL 18   Creatinine 0.52 - 1.04 mg/dL 1.04   Calcium 8.5 - 10.1 mg/dL 8.7   Magnesium 1.6 - 2.3 mg/dL 1.5 (A)   Bilirubin Total 0.2 - 1.3 mg/dL 0.3   ALT 0 - 50 U/L 25   AST 0 - 45 U/L 19   Alkaline Phosphatase 40 - 150 U/L 102   Albumin 3.4 - 5.0 g/dL 3.3 (A)   Protein Total 6.8 - 8.8 g/dL 6.8   LD 81 - 234 U/L 157   Amylase 30 - 110 U/L 33   WBC 4.0 - 11.0 10e9/L 5.8    pending      Assessment/Plan:  1) Recurrent stage IIIC bilateral ovarian cancer: Patient tolerating without dose limiting side effects. Proceed with cycle twelve niraparib. Due for CT CAP prior to next cycle per study guidelines- order placed. Fatigue manageable with activity and naps. ECOG 1. Reviewed signs and symptoms for when she should contact the clinic or seek additional care, including but not limited to fever, chills, inability to keep down food or fluids, nausea and vomiting not controlled with antiemetics, and diarrhea leading to dehydration. Patient to contact the clinic with any questions or concerns in the interim. Betzy Jones RN in for teaching and study follow up.   2) Electrolyte disturbance: Asymptomatic. To increase potassium chloride from 20mEq daily to 20mEq BID for the next month. To attempt to increase magnesium oxide from 400mg PO daily to twice daily if she can tolerate this without worsening diarrhea.  3) Genetic counseling: Testing has been performed and she is negative for mutations in BRCA1, BRCA2, EPCAM, MLH1, MSH2, MSH6, PMS2, PTEN, and TP53 genes  4) Health maintenance issues discussed include to continue following up with PCP for non-gynecologic  concerns.  5) Patient verbalized understanding of and agreement with plan    A total of 20 minutes spent with patient, over 50% spent in counseling and coordination of care.    HAILE De Paz, FNP-C  Family Nurse Practitioner  Gynecologic Oncology  [facility]  Pager: [phone]

## 2017-04-19 ENCOUNTER — APPOINTMENT (OUTPATIENT)
Dept: LAB | Facility: CLINIC | Age: 57
End: 2017-04-19
Attending: NURSE PRACTITIONER
Payer: COMMERCIAL

## 2017-04-19 ENCOUNTER — ONCOLOGY VISIT (OUTPATIENT)
Dept: ONCOLOGY | Facility: CLINIC | Age: 57
End: 2017-04-19
Attending: NURSE PRACTITIONER
Payer: COMMERCIAL

## 2017-04-19 ENCOUNTER — RESEARCH ENCOUNTER (OUTPATIENT)
Dept: ONCOLOGY | Facility: CLINIC | Age: 57
End: 2017-04-19

## 2017-04-19 VITALS
BODY MASS INDEX: 23.76 KG/M2 | OXYGEN SATURATION: 100 % | SYSTOLIC BLOOD PRESSURE: 135 MMHG | WEIGHT: 142.8 LBS | TEMPERATURE: 98.2 F | DIASTOLIC BLOOD PRESSURE: 61 MMHG | HEART RATE: 105 BPM

## 2017-04-19 DIAGNOSIS — E83.42 HYPOMAGNESEMIA: ICD-10-CM

## 2017-04-19 DIAGNOSIS — Z79.899 ENCOUNTER FOR LONG-TERM (CURRENT) USE OF MEDICATIONS: ICD-10-CM

## 2017-04-19 DIAGNOSIS — C56.2 OVARIAN CANCER, LEFT (H): ICD-10-CM

## 2017-04-19 DIAGNOSIS — C56.1 OVARIAN CANCER, RIGHT (H): Primary | ICD-10-CM

## 2017-04-19 DIAGNOSIS — Z00.6 PATIENT IN CANCER RELATED RESEARCH STUDY: ICD-10-CM

## 2017-04-19 DIAGNOSIS — E87.6 HYPOKALEMIA: ICD-10-CM

## 2017-04-19 LAB
ALBUMIN SERPL-MCNC: 3.3 G/DL (ref 3.4–5)
ALBUMIN UR-MCNC: NEGATIVE MG/DL
ALP SERPL-CCNC: 102 U/L (ref 40–150)
ALT SERPL W P-5'-P-CCNC: 25 U/L (ref 0–50)
AMYLASE SERPL-CCNC: 33 U/L (ref 30–110)
ANION GAP SERPL CALCULATED.3IONS-SCNC: 10 MMOL/L (ref 3–14)
APPEARANCE UR: CLEAR
AST SERPL W P-5'-P-CCNC: 19 U/L (ref 0–45)
BACTERIA #/AREA URNS HPF: ABNORMAL /HPF
BASOPHILS # BLD AUTO: 0 10E9/L (ref 0–0.2)
BASOPHILS NFR BLD AUTO: 0.5 %
BILIRUB SERPL-MCNC: 0.3 MG/DL (ref 0.2–1.3)
BILIRUB UR QL STRIP: NEGATIVE
BUN SERPL-MCNC: 18 MG/DL (ref 7–30)
CALCIUM SERPL-MCNC: 8.7 MG/DL (ref 8.5–10.1)
CANCER AG125 SERPL-ACNC: 127 U/ML (ref 0–30)
CHLORIDE SERPL-SCNC: 105 MMOL/L (ref 94–109)
CO2 SERPL-SCNC: 26 MMOL/L (ref 20–32)
COLOR UR AUTO: ABNORMAL
CREAT SERPL-MCNC: 1.04 MG/DL (ref 0.52–1.04)
DIFFERENTIAL METHOD BLD: ABNORMAL
EOSINOPHIL # BLD AUTO: 0.1 10E9/L (ref 0–0.7)
EOSINOPHIL NFR BLD AUTO: 1.7 %
ERYTHROCYTE [DISTWIDTH] IN BLOOD BY AUTOMATED COUNT: 13 % (ref 10–15)
GFR SERPL CREATININE-BSD FRML MDRD: 54 ML/MIN/1.7M2
GGT SERPL-CCNC: 21 U/L (ref 0–40)
GLUCOSE SERPL-MCNC: 117 MG/DL (ref 70–99)
GLUCOSE UR STRIP-MCNC: NEGATIVE MG/DL
HCT VFR BLD AUTO: 34.6 % (ref 35–47)
HGB BLD-MCNC: 11.7 G/DL (ref 11.7–15.7)
HGB UR QL STRIP: NEGATIVE
IMM GRANULOCYTES # BLD: 0 10E9/L (ref 0–0.4)
IMM GRANULOCYTES NFR BLD: 0.5 %
KETONES UR STRIP-MCNC: NEGATIVE MG/DL
LDH SERPL L TO P-CCNC: 157 U/L (ref 81–234)
LEUKOCYTE ESTERASE UR QL STRIP: NEGATIVE
LYMPHOCYTES # BLD AUTO: 2.1 10E9/L (ref 0.8–5.3)
LYMPHOCYTES NFR BLD AUTO: 35.1 %
MAGNESIUM SERPL-MCNC: 1.5 MG/DL (ref 1.6–2.3)
MCH RBC QN AUTO: 36.6 PG (ref 26.5–33)
MCHC RBC AUTO-ENTMCNC: 33.8 G/DL (ref 31.5–36.5)
MCV RBC AUTO: 108 FL (ref 78–100)
MONOCYTES # BLD AUTO: 0.6 10E9/L (ref 0–1.3)
MONOCYTES NFR BLD AUTO: 10.4 %
MUCOUS THREADS #/AREA URNS LPF: PRESENT /LPF
NEUTROPHILS # BLD AUTO: 3 10E9/L (ref 1.6–8.3)
NEUTROPHILS NFR BLD AUTO: 51.8 %
NITRATE UR QL: NEGATIVE
NRBC # BLD AUTO: 0 10*3/UL
NRBC BLD AUTO-RTO: 0 /100
PH UR STRIP: 5 PH (ref 5–7)
PLATELET # BLD AUTO: 226 10E9/L (ref 150–450)
POTASSIUM SERPL-SCNC: 3.2 MMOL/L (ref 3.4–5.3)
PROT SERPL-MCNC: 6.8 G/DL (ref 6.8–8.8)
RBC # BLD AUTO: 3.2 10E12/L (ref 3.8–5.2)
RBC #/AREA URNS AUTO: 0 /HPF (ref 0–2)
SODIUM SERPL-SCNC: 141 MMOL/L (ref 133–144)
SP GR UR STRIP: 1 (ref 1–1.03)
SQUAMOUS #/AREA URNS AUTO: <1 /HPF (ref 0–1)
URN SPEC COLLECT METH UR: ABNORMAL
UROBILINOGEN UR STRIP-MCNC: 0 MG/DL (ref 0–2)
WBC # BLD AUTO: 5.8 10E9/L (ref 4–11)
WBC #/AREA URNS AUTO: <1 /HPF (ref 0–2)

## 2017-04-19 PROCEDURE — 25000128 H RX IP 250 OP 636: Mod: ZF | Performed by: NURSE PRACTITIONER

## 2017-04-19 PROCEDURE — 36591 DRAW BLOOD OFF VENOUS DEVICE: CPT

## 2017-04-19 PROCEDURE — 86304 IMMUNOASSAY TUMOR CA 125: CPT | Performed by: NURSE PRACTITIONER

## 2017-04-19 PROCEDURE — 81001 URINALYSIS AUTO W/SCOPE: CPT | Performed by: NURSE PRACTITIONER

## 2017-04-19 PROCEDURE — 83735 ASSAY OF MAGNESIUM: CPT | Performed by: NURSE PRACTITIONER

## 2017-04-19 PROCEDURE — 99213 OFFICE O/P EST LOW 20 MIN: CPT | Mod: ZP | Performed by: NURSE PRACTITIONER

## 2017-04-19 PROCEDURE — 82150 ASSAY OF AMYLASE: CPT | Performed by: NURSE PRACTITIONER

## 2017-04-19 PROCEDURE — 83615 LACTATE (LD) (LDH) ENZYME: CPT | Performed by: NURSE PRACTITIONER

## 2017-04-19 PROCEDURE — 99212 OFFICE O/P EST SF 10 MIN: CPT | Mod: ZF

## 2017-04-19 PROCEDURE — 80053 COMPREHEN METABOLIC PANEL: CPT | Performed by: NURSE PRACTITIONER

## 2017-04-19 PROCEDURE — 82977 ASSAY OF GGT: CPT | Performed by: NURSE PRACTITIONER

## 2017-04-19 PROCEDURE — 85025 COMPLETE CBC W/AUTO DIFF WBC: CPT | Performed by: NURSE PRACTITIONER

## 2017-04-19 RX ORDER — HEPARIN SODIUM (PORCINE) LOCK FLUSH IV SOLN 100 UNIT/ML 100 UNIT/ML
5 SOLUTION INTRAVENOUS EVERY 8 HOURS
Status: DISCONTINUED | OUTPATIENT
Start: 2017-04-19 | End: 2017-04-19 | Stop reason: HOSPADM

## 2017-04-19 RX ADMIN — SODIUM CHLORIDE, PRESERVATIVE FREE 5 ML: 5 INJECTION INTRAVENOUS at 14:15

## 2017-04-19 ASSESSMENT — ENCOUNTER SYMPTOMS
VOMITING: 0
POLYPHAGIA: 0
SMELL DISTURBANCE: 0
NECK MASS: 0
DIARRHEA: 1
RECTAL PAIN: 0
WEIGHT GAIN: 0
JAUNDICE: 0
INCREASED ENERGY: 0
BLOOD IN STOOL: 0
BOWEL INCONTINENCE: 0
HALLUCINATIONS: 0
TASTE DISTURBANCE: 0
TROUBLE SWALLOWING: 0
FEVER: 0
WEIGHT LOSS: 0
NIGHT SWEATS: 0
SINUS CONGESTION: 0
POLYDIPSIA: 0
BLOATING: 0
DECREASED APPETITE: 0
FATIGUE: 1
SINUS PAIN: 0
RECTAL BLEEDING: 0
HEARTBURN: 0
ALTERED TEMPERATURE REGULATION: 0
CONSTIPATION: 0
ABDOMINAL PAIN: 0
NAUSEA: 0
SORE THROAT: 0
CHILLS: 0
HOARSE VOICE: 0

## 2017-04-19 ASSESSMENT — PAIN SCALES - GENERAL: PAINLEVEL: NO PAIN (0)

## 2017-04-19 NOTE — MR AVS SNAPSHOT
After Visit Summary   4/19/2017    Odalys Nathan    MRN: [id number]           Patient Information     Date Of Birth          1960        Visit Information        Provider Department      4/19/2017 2:40 PM Patricia Rocha APRN CNP [facility]        Today's Diagnoses     Ovarian cancer, right (H)    -  1    Ovarian cancer, left (H)        Patient in cancer related research study        Encounter for long-term (current) use of medications        Hypokalemia        Hypomagnesemia           Follow-ups after your visit        Future tests that were ordered for you today     Open Future Orders        Priority Expected Expires Ordered    CT Chest/Abdomen/Pelvis w Contrast Routine 5/17/2017 4/19/2018 4/19/2017            Who to contact     If you have questions or need follow up information about today's clinic visit or your schedule please contact [facility] directly at [phone].  Normal or non-critical lab and imaging results will be communicated to you by [employer], letter or phone within 4 business days after the clinic has received the results. If you do not hear from us within 7 days, please contact the clinic through [employer] or phone. If you have a critical or abnormal lab result, we will notify you by phone as soon as possible.  Submit refill requests through [employer] or call your pharmacy and they will forward the refill request to us. Please allow 3 business days for your refill to be completed.          Additional Information About Your Visit        MyChart Information     [employer] gives you secure access to your electronic health record. If you see a primary care provider, you can also send messages to your care team and make appointments. If you have questions, please call your primary care clinic.  If you do not have a primary care provider, please call [phone] and they will assist you.        Care EveryWhere ID     This is your Care  EveryWhere ID. This could be used by other organizations to access your [place] medical records  [id number]        Your Vitals Were     Pulse Temperature Pulse Oximetry BMI (Body Mass Index)          105 98.2  F (36.8  C) (Oral) 100% 23.76 kg/m2         Blood Pressure from Last 3 Encounters:   04/19/17 135/61   03/22/17 134/73   02/23/17 134/84    Weight from Last 3 Encounters:   04/19/17 64.8 kg (142 lb 12.8 oz)   03/22/17 64.1 kg (141 lb 4.8 oz)   02/23/17 62.9 kg (138 lb 11.2 oz)              We Performed the Following     Amylase          CBC with platelets differential     Comprehensive metabolic panel     GGT     Lactate Dehydrogenase     Magnesium     Routine UA with microscopic - No culture     Routine UA with microscopic          Today's Medication Changes          These changes are accurate as of: 4/19/17  4:14 PM.  If you have any questions, ask your nurse or doctor.               These medicines have changed or have updated prescriptions.        Dose/Directions    cyanocobalamin 1000 MCG/ML injection   Commonly known as:  VITAMIN B12   This may have changed:  when to take this   Used for:  B12 deficiency        Dose:  1 mL   Inject 1 mL (1,000 mcg) into the muscle every 30 days   Quantity:  1 mL   Refills:  11       magnesium oxide 400 MG tablet   Commonly known as:  MAG-OX   This may have changed:  when to take this   Used for:  Ovarian cancer, unspecified laterality (H)        Dose:  400 mg   Take 1 tablet (400 mg) by mouth 2 times daily   Quantity:  90 tablet   Refills:  3       * study - niraparib 100 mg capsule   Commonly known as:  IDS# [id number]   This may have changed:  Another medication with the same name was added. Make sure you understand how and when to take each.   Used for:  Ovarian cancer, right (H), Ovarian cancer, left (H), Drug-induced neutropenia (H)   Changed by:  Patricia Rocha APRN CNP        Dose:  200 mg   Take 2 capsules (200 mg) by mouth every morning Take at the same  time each day, preferably morning. Swallow whole and do NOT chew capsules. Food and water is permissible. First dose will be administered on site.   Quantity:  84 capsule   Refills:  0       * study - niraparib 100 mg capsule   Commonly known as:  IDS# [id number]   This may have changed:  You were already taking a medication with the same name, and this prescription was added. Make sure you understand how and when to take each.   Used for:  Ovarian cancer, right (H), Ovarian cancer, left (H)   Changed by:  Patricia Rocha APRN CNP        Dose:  200 mg   Take 2 capsules (200 mg) by mouth every morning Take at the same time each day, preferably morning. Swallow whole and do NOT chew capsules. Food and water is permissible. First dose will be administered on site.   Quantity:  84 capsule   Refills:  0       * Notice:  This list has 2 medication(s) that are the same as other medications prescribed for you. Read the directions carefully, and ask your doctor or other care provider to review them with you.         Where to get your medicines      Some of these will need a paper prescription and others can be bought over the counter.  Ask your nurse if you have questions.     Bring a paper prescription for each of these medications     study - niraparib 100 mg capsule                Primary Care Provider Office Phone # Fax #    Aubrie Hester [phone]       [facility] [address] [address] [facility] [address]        Thank you!     Thank you for choosing [facility]  for your care. Our goal is always to provide you with excellent care. Hearing back from our patients is one way we can continue to improve our services. Please take a few minutes to complete the written survey that you may receive in the mail after your visit with us. Thank you!             Your Updated Medication List - Protect others around you: Learn how to safely use, store and throw away your medicines at  www.disposemymeds.org.          This list is accurate as of: 4/19/17  4:14 PM.  Always use your most recent med list.                   Brand Name Dispense Instructions for use    ASPIRIN PO      Take 325 mg by mouth daily       CALCIUM + D PO      Take 1 tablet by mouth daily.       cyanocobalamin 1000 MCG/ML injection    VITAMIN B12    1 mL    Inject 1 mL (1,000 mcg) into the muscle every 30 days       diphenoxylate-atropine 2.5-0.025 MG per tablet    LOMOTIL    60 tablet    Take 2 tablets by mouth 4 times daily as needed for diarrhea       ferrous sulfate 325 (65 FE) MG tablet    IRON    90 tablet    Take 1 tablet (325 mg) by mouth 3 times daily (with meals) Take with small amount of orange juice, do not take with calcium       HERBALS      daily       LEVOTHYROXINE SODIUM PO      Take by mouth daily       LORazepam 1 MG tablet    ATIVAN    30 tablet    Take 1 tablet (1 mg) by mouth every 6 hours as needed (Anxiety, Nausea/Vomiting or Sleep)       magnesium oxide 400 MG tablet    MAG-OX    90 tablet    Take 1 tablet (400 mg) by mouth 2 times daily       METFORMIN HCL PO      Take 500 mg by mouth daily       order for DME     3 each    Injection Supplies for Vitamin B12: 3cc syringes w/ 27 gauge needles, 1/2 inch length       potassium chloride 20 MEQ Packet    KLOR-CON     Take 20 mEq by mouth daily       * study - niraparib 100 mg capsule    IDS# [id number]    84 capsule    Take 2 capsules (200 mg) by mouth every morning Take at the same time each day, preferably morning. Swallow whole and do NOT chew capsules. Food and water is permissible. First dose will be administered on site.       * study - niraparib 100 mg capsule    IDS# [id number]    84 capsule    Take 2 capsules (200 mg) by mouth every morning Take at the same time each day, preferably morning. Swallow whole and do NOT chew capsules. Food and water is permissible. First dose will be administered on site.       VITAMIN C PO      Take 500 mg by mouth daily        VITAMIN D PO      Take 1,000 Units by mouth daily Unknown dose       VITAMIN E NATURAL PO      Take 100 Units by mouth daily       * Notice:  This list has 2 medication(s) that are the same as other medications prescribed for you. Read the directions carefully, and ask your doctor or other care provider to review them with you.

## 2017-04-19 NOTE — NURSING NOTE
Patient here today for C12D1 visit for the Tesaro/Quadra Niraparib study.  Patient denies any issues or concerns at this time.  Patient returned 37 pills from bottle number [id number] along with pill diary.  Pills taken match pills returned on pill diary.  Labs reviewed and ok to continue study medication at current dose.  Patient was dispensed bottle number [id number] along with new pill diary.  Next appointments made per protocol.      Prudence Jones RN     Pager [phone]

## 2017-04-19 NOTE — NURSING NOTE
"Odalys Nathan is a 56 year old female who presents for:  Chief Complaint   Patient presents with     Blood Draw     Labs drawn through portacath and vital signs checked follow up  Ovarian CA     Oncology Clinic Visit     Metastatic cancer (H)        Initial Vitals:  /61 (BP Location: Right arm, Patient Position: Chair, Cuff Size: Adult Regular)  Pulse 105  Temp 98.2  F (36.8  C) (Oral)  Wt 64.8 kg (142 lb 12.8 oz)  SpO2 100%  BMI 23.76 kg/m2 Estimated body mass index is 23.76 kg/(m^2) as calculated from the following:    Height as of 3/22/17: 1.651 m (5' 5\").    Weight as of this encounter: 64.8 kg (142 lb 12.8 oz).. Body surface area is 1.72 meters squared. BP completed using cuff size: regular  No Pain (0) No LMP recorded. Patient has had a hysterectomy. Allergies and medications reviewed.     Medications: Medication refills not needed today.  Pharmacy name entered into [employer]:    [id number] - [place], [place] - [address]  [facility] - [place], [place] - [address]  [facility] - [place], [place] - [address]    Comments:     6 minutes for nursing intake (face to face time)   Sophia Oneill CMA        "

## 2017-04-19 NOTE — LETTER
2017       RE: Odalys Nathan  [id number]  [facility] [id number]     Dear Colleague,    Thank you for referring your patient, Odalys Nathan, to the [facility]. Please see a copy of my visit note below.    Gynecologic Oncology Follow-Up Note  RE: Odalys Nathan  MRN: [id number]  : 1960  Date of Visit: 2017    CC: Odalys Nathan is a 56 year old year old female with recurrent stage IIIC bilateral ovarian cancer who presents today for follow up regarding disease management while on the TESARO niraparib trial.    HPI: Odalys comes to the clinic accompanied by her  Marcos. She continues to feel well on niraparib but continues with fatigue. She is physically active and takes naps as needed to manage this, states fatigue is stable. She has continued to take ferrous sulfate 325mg TID with resolution of her anemia- she is tolerating this without side effects. She reports taking iron has improved her baseline diarrhea. She continues with chronic tinnitus she has had since she was a child, denies any worsening of this. She notes she previously had cloudy urine but has been taking two cranberry pills daily with improvement. She self reports an ECOG score of 1. She is eating and drinking well (states she has not drank a lot of fluid today though) and reports feeling ready to continue with niraparib. She is excited to return to [place] May 6th-.      Brief Oncology History:  2012 - Admitted to hospital for 2 weeks of intermittent abdominal cramping, distention, diarrhea and N/V. CT of abdomen/pelvis significant for small bowel obstruction, a heterogenous soft tissue density in the pelvis, omental nodules, and ascites. Bilateral adnexal masses per U/S of pelvis. CA-125 was elevated at 987, CEA was normal at 1.0.    12 - Therapeutic paracentesis (4 L) with cytology confirming malignancy (CO positive, weak ER positive, CK-7 positive consistent  with GYN primary). Surgery recommended d/t potential for falling blood counts 2/2 chemotherapy (patient is [employer]ism and limiting blood transfusion)    2/1/12 - Exploratory laparotomy, MAR BSO, lysis of adhesion, Appendectomy, Repair cystotomy, Omentectomy Vdxr-ajlitq-kfxylltdt-transverse-colon resection, Ileo-descending colon anastomosis, CUSA, & Colonoscopy (done by Dr. Amato and colorectal team).  2/20/2012 - Admitted to hospital for bilateral pulmonary emboli and drainage of pleural effusion. Started on Lovenox.  2/28/12-3/21/12: Cycle #1-2 Carbo/Taxol IV  3/29/12 - Started on Keflex by her PCP for infection in her healing wound (immediately below her umbilicus).    4/11/12-6/14/12: Cycle #3-6 IV chemo, Cycle #1 IV/IP chemo  7/16/12 -  8, CT PRADEEP - enrolled in  - observation arm  3/4/13-6/28/13:  6, 9, 12, 15, 20.  7/1/13: CT Chest, Abdomen, Pelvis IMPRESSION:  1. Worsening metastatic ovarian carcinoma suggested by increased size of soft tissue nodules anterior to the right psoas muscle, that may represent growing mesenteric lymphadenopathy.  2. Remaining prominent right lower quadrant mesenteric lymph nodes are not significantly changed from CT 7/16/2012.  3. Clustered nodular opacities in the right lower lobe are not significant change from 7/16/2012 and remain indeterminate. Again, the appearance and distribution is suggestive of an infectious etiology.  4. Stable 8 mm soft tissue nodule in left breast, unchanged since at least 02/20/2012. This can be observed on followup studies, but correlation with mammography could be considered.  Decision made to start Doxil/Carbo.  7/11/13: The left ventricular ejection fraction is normal at 66.4%.  7/12/13-9/6/13: Cycle #1-3 Doxil/Carbo.  10, 11.    9/30/13 CT C/A/P Impression:  1. Overall, favorable response to treatment with decreasing size of soft tissue nodules tracking along the anterior aspect of the right psoas muscle.  2.  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  3. Improved cluster of right lower lobe pulmonary nodular opacities. These may represent resolving infection.  10/3/13-12/9/13:  9, 8, 10. Cycle 4-6 Doxil/Carbo.    1/13/14 CT C/A/P Impression:   1. Stable appearance of metastatic ovarian cancer. Scattered soft tissue nodules along the anterior aspect of the right psoas muscle are unchanged in size. Mild mesenteric lymphadenopathy is unchanged.  2. Clustered micronodules in the right lower lobe are unchanged from 9/30/2013, but improved from 7/1/2013. This history suggests a postinflammatory/postinfectious etiology.  3. 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  1/16/14 Discussed multiple options for her based on relatively stable-appearing disease on CT but slight increase in  (which has had small increase to 20 with last recurrence) including chemo break with recheck of  in 1 month, starting new chemo agent immediately, and exploratory surgery with possible resection of nodules. She is considered platinum-sensitive based on > 1 year remission after Taxol/Carbo, which we will take into consideration for future chemo planning. I am not inclined to surgery at this time given difficulty she already has with diarrhea secondary to past colon resection. I suspect we would need to resect further bowel due to mesenteric disease. Also explained inherent risks of any major surgery. Also mentioned maintenance chemo, but this has not been shown to increase overall survival and would likely decrease her quality of life without significant benefit. Family is going on vacation to [place] in 2 weeks and Odalys does not want to have chemo prior to that, so will plan to take 1 month break. She can have  at that time (discussed checking toady since last draw was in early December, but as it would likely not change treatment plan and she has h/o slow rising , will not check today).  2/17/14  16  "   2/20/14: Decision to take break from chemo for two months, followed by CT and CA-125.    4/21/14  27  4/21/14 CT C/A/P Impression:    1. Increased size of 2 low-attenuation lymph nodes anterior to the right psoas muscle is concerning for worsening metastatic ovarian cancer.    2. New circumferential thickening of a 3.8 cm length segment of distal transverse colon is likely physiologic. Recommend attention on followup imaging.    3. Grossly unchanged size of clustered small nodules versus scarring in the right lower lobe the lungs.    4. Stable thrombosis of the right ovarian vein.  5/14/14: Diagnostic laparoscopy converted to exploratory laparotomy and removal of mesenteric masses, tumor debulking, peritoneal biopsies and intraperitoneal port placement. On laparoscopy, it was noted that there were small nodules on the anterior abdominal wall near the previous incision, small nodules on the right pelvic sidewall as well. Nodules were palpated in the mesentery; however, as it was unable to clarify where the origin of the nodules was, the decision was made to open the patient. On opening there was found to be approximately a 3 cm nodule in the small bowel mesentery and another separate approximately 2 cm nodule in the bowel mesentery. Pelvis without evidence of cancer, some mesenteric lymph nodes were palpated. No evidence otherwise of any disseminated cancer throughout the abdomen.    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 (2/2)  -Largest metastasis: 1.3 cm  -See comment  D: Peritoneum, right paracolic gutter #2, biopsy:  -Fibroadipose tissue with " "granulomatous inflammation surrounding refractile material  -Negative for malignancy   E: Small bowel adhesion, biopsy:  -Fibroconnective tissue, consistent with adhesion  -Negative for malignancy  F: Lymph nodes, mesentry, not otherwise specified, excision:  -Two lymph nodes, negative for metastatic carcinoma (0/2)  G: Lymph node, mesentery, \"#2\", excision:  -One lymph node, negative for metastatic carcinoma (0/1)  H: Lymph nodes, mesentery, \"nodule #2\", excision:  -Five lymph nodes, negative for metastatic carcinoma (0/5)  COMMENT:  Some of the specimens show post-operative changes. Others show possible treatment related changes, including necrosis. The metastatic carcinoma in the mesenteric lymph nodes (specimen C) shows variable morphology, including relatively low grade tumor with papillary architecture, and high grade tumor comprised of nests of tumor cells with irregular, slit-like spaces and marked nuclear pleomorphism.    5/29/14: Cycle 1 IV PACLitaxel / IP CISplatin / IP PACLitaxel.  - 28.  6/26/14: Cycle #2 IV/IP.  10  8/5/14: CT chest/abd/pelvis IMPRESSION     1. In this patient with ovarian cancer, overall findings are indicative of stable/slight improvement, as multiple mesenteric lymphadenopathy and scattered nodular peritoneal soft tissue mass lesions appear unchanged or slightly smaller since 4/21/2014.    2. 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 Mild dilatation of the second and the third portion of the duodenum with a narrow SMA angle. This could represent SMA syndrome, if clinically correlated.17/14:    Cycle #3 IV/IP.  10.    CT chest/abd/pelvis with contrast on 8/5/14    Impression:    1. In this patient with ovarian cancer, overall findings are indicative of stable/slight improvement, as multiple mesenteric lymphadenopathy and scattered nodular peritoneal soft tissue mass lesions appear unchanged or slightly smaller since 4/21/2014.    2. 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 Mild dilatation of the second and the third portion of the duodenum with a narrow SMA angle. This could represent SMA syndrome, if clinically correlated.  8/7/14: Cycle #4 Taxol/Carbo (changed from IV/IP).  10. She has been feeling okay. She is unsure if she can finish out the course of 6 cycles IV/IP taxol/cisplatin. She feels like she has the flu for about a week then starts feeling gradually better after each chemo cycle. Her spouse notes that she actually has been more sick with the treatments than she initially admits here. She was also previously having some rib pain. Denies any rib pain now. Denies any chest pain or shortness of breath.  Plan: discussed recent CT cap results and switching to just IV as she is feeling miserable with IP treatments. Switch to IV carbo/taxol as patient is platinum sensitive.  We also discussed her taking part of the tesaro trial, which would require BRCA testing. She would like to take part in this trial if eligible.  8/20/14: Remove Intraperitoneal Port ( Port and catheter intact - discarded)  8/28/14: Cycle #5 Taxol/Carbo held due to thrombocytopenia.  6.    She denies any vaginal bleeding, no changes in her bowel or bladder habits, no nausea/emesis, no lower extremity edema, and no difficulties eating or sleeping. She denies any abdominal discomfort/bloating, no fevers or chills, and no chest pain or shortness of breath. She states her diarrhea is the same. She reports some fatigue which improves about 1-2 weeks after her chemotherapy. She states she does not need any medication refills and she was told she does not meet the criteria for the TESARO trial. She states she has 3 bags of iv fluids left over from her previous chemotherapy and will give these to herself. She states she is ready for her treatment today.    9/29/14: Cycle #6 Taxol/Carbo  6. Insurance questions regarding GSF coverage today. No  concerns other than fatigue. Taking iron for anemia and does not desire blood transfusion. Using neulasta for neutropenia. Using home IV hydration if needed. Baseline unchanged. No abdominal bloating, constipation, diarrhea, pain, vaginal or rectal bleeding, cough or dyspnea, fluid retention.    10/16/14: Impression:    1. Nodular peritoneal soft tissue mass in the right lower quadrant adjacent to the psoas muscle is no longer appreciated. Adjacent prominent lymphadenopathy is unchanged from previous exam. No new peritoneal lesions.    2. 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    10/20/14:  5. CT chest/abdomen/pelvis on 10/16/14 showed nodular peritoneal soft tissue mass in the right lower quadrant adjacent to the psoas muscle is no longer appreciated. Adjacent prominent lymphadenopathy is unchanged from previous exam. 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  1/27/15:  6.  4/28/15:  14.  5/26/15:  18.  6/2/15: CT cap Impression:  1. Postsurgical changes of hysterectomy and bilateral salpingo-oophorectomy for ovarian cancer. There is a new 8 mm hazy, ill-defined hypoattenuating lesion in hepatic segment 6 which is suspicious for a metastatic deposit. Further evaluation with ultrasound in recommended.    2. Increased size of a left retroperitoneal lymph node which is indeterminate but may represent a ciro metastasis. Mildly prominent lymph nodes in the right lower quadrant are not significantly changed.  3. Moderate colonic stool burden.    6/4/15: US abdomen IMPRESSION:    Hyperechoic lesion in the right hepatic lobe, consistent with hemangioma. This does not corresponds to the area of the lesion seen on CT from 6/2/2015. An MR would be helpful for identifying and characterizing the lesion from the recent CT.  6/12/15: MR abd IMPRESSION:  1. New 20 x 11 mm enhancing lesions between the right obliques, concerning for metastatic  disease. This lesions should be amenable to percutaneous biopsy, if indicated.  2. Correlating to the lesion visualized on comparison CT is a hepatic segment 6 subcapsular 7 mm lesion. Overall the appearance favors the diagnosis of a simple cyst. However, there is faint suggestion of mild peripheral arterial enhancement. Although this is favored as  artifactual, this should be followed up to confirm stability. Recommend 6 month followup.  3. Hepatic segment 6, 5 mm lesion too small to technically characterize. Differential would favor FNH, less likely flash filling hemangioma. Recommend attention on followup.  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     9/1/15: Cycle #1 Avastin/Cytoxan.   31.     9/24/15:feeling generally well. She says she has been having back and stomach spasms. She is taking cytosine daily (she ran out yesterday). She also says its affecting her voice. Admits that it burns occasionally. She is eating and drinking normal. She also admit diarrhea, 5-7 times daily, lose/watery. She trying to stay hydrated and eat fiber. She also says that her body is sore, especially the bottom of her feet. Her blood pressure is normal. She also admits having headache after her first infusion.      9/24/15: Cycle #2 Avastin/Cytoxan.  19.  10/15/15: Cycle #3 Avastin/Cytoxan.  16.     11/6/15: CT c/a/p IMPRESSION:    1. Stable postoperative change of MAR/BSO for ovarian cancer.  2. The lesion in the right flank abdominal musculature is slightly decreased in size. Otherwise, stable examination.  2. No evidence of metastatic disease in the chest.    11/20/15: Treatment planning visit,  16    11/25/15: surgical pathology report  FINAL DIAGNOSIS:  Soft tissue, right oblique muscle mass, excision:  -Recurrent ovarian serous carcinoma  -Carcinoma is present less  than 1 mm from one resection margin  -Background skeletal muscle and fibroadipose tissue    1/4/16:  22  1/11/16-1/27/16: Radiation to right flank x 12 treatments  4/7/2016:  94. CT cap IMPRESSION:    In this patient with ovarian cancer status post MAR/BSO and descending/transverse colectomy:  1. No evidence for malignancy in the chest, abdomen, or pelvis.  2. Stable small hypodense segment 6 liver lesion, appears more likely benign, possibly a cyst.  5/13/16:  124.  6/3/16: PET CT IMPRESSION: In this patient with a history of ovarian cancer:  1. Hypermetabolic and enlarging periaortic and perihepatic lymphadenopathy compatible with metastatic disease, as detailed above.  2. Although hypodense lesion in hepatic segment 6 has been present since 6/2/2015 associated hypermetabolism makes this lesion highly concerning for metastatic disease.    Plan: to start Niraparib under TESARO study.     6/9/16:  137.  6/14/16: Cycle #1 Niraparib.    6/28/16: Cycle #1 D15 Niraparib.    7/5/16:  100.    7/11/16: Cycle #2 Nraparib.   83.    8/3/2016: PET CT IMPRESSION:    In this patient with known history of ovarian cancer:  1) New pleural based nodular opacities in the lateral and inferior aspects of the bilateral lower lobes, worse in the left lung. Likely infection. Close follow up is recommended.      2) Slight decrease in hypermetabolic abdominal lymphadenopathy. 2 hypermetabolic lymph nodes persist.  3) Unchanged right hepatic lobe metastatic lesion.     8/9/16: Cycle #3 Niraparib.  69.  9/6/16: Cycle #4 Niraparib.  53. Dose held due to anemia.  9/13/16: Eval for potential cycle 4 niraparib. Dose held due to anemia.  10/4/16: CT CAP impression:  IMPRESSION: In this patient with a known history of ovarian cancer:  1. There has been interval resolution of pleural-based nodular  opacities which likely represented infection.  2. Abdominal lymphadenopathy in the form of 2 portacaval  lymph nodes  have not significantly changed in size, noted to be hypermetabolic on  prior PET/CT.  3. Previously demonstrated metastatic lesion in the right lobe of the  liver is not significantly changed.  10/11/16:  60  11/1/16: C6 niraparib,  75  11/29/16: C7 niraparib.  78. CT CAP impression as follows:  Target lesions (RECIST criteria):       A previously described target lesion superior to the head of the  pancreas (series 2, image 64)  (referred to as a perihepatic node on  6/3/2016) may not be a valid target lesion because it measured less  than 1.5 cm originally. However, this particular node has decreased in  size, now measuring 7 mm in short axis versus 14 mm on 6/3/2016 when  measured in a similar fashion.       2.3 cm short axis portacaval lymph node on series 2 image 67,  previously 2.0 cm on 10/4/2016.       1.2 cm subtle hypodensity in hepatic segment 6 on series 2 image 75,  stable on multiple studies since at least 6/3/2016     Sum of diameters today: 3.5 cm. Sum of diameters 10/4/2016: 3.2 cm.  Growth = 9%.    12/27/16: C8 niraparib.  105.  1/25/17: C9 niraparib.  108.  2/23/17: C10 niraparib.  132.   CT CAP impression:  Sum of target lesion diameters today: 3.7 cm. Sum of target lesion  diameters on 11/28/2016: 3.5 cm. Growth= 6%  1. In this patient with history of ovarian cancer there is stable  disease by RECIST criteria as evidenced by:   1a. Mildly increased size of liver metastasis.  1b. Stable portacaval lymphadenopathy.  1c. No evidence of metastatic disease in the chest.  2. Trace emphysematous changes of the lungs.  3/22/17: C11 niraparib.  132.  4/19/17: C12 niraparib.  pending.          Past Medical History:   Diagnosis Date     Antiplatelet or antithrombotic long-term use      Ascites      Blood clot in the legs      Diabetes (H)      Ovarian cancer (H)     serous,stg IV     Pleural effusion      Pulmonary embolism (H) 2/2012     Refusal  of blood transfusions as patient is Alevism      Short gut syndrome      Subclinical hypothyroidism 4/18/2013     Thrombosis of leg        Past Surgical History:   Procedure Laterality Date     COLECTOMY       COLONOSCOPY  2/1/2012    Procedure:COLONOSCOPY; With Biopsy; Surgeon:TONI SULLIVAN; Location:[facility] OR     HYSTERECTOMY TOTAL ABD, RHIANNA SALPINGO-OOPHORECTOMY, NODE DISSECTION, TUMOR DEBULKING, COMBINED  2/1/2012    Procedure:COMBINED HYSTERECTOMY TOTAL ABDOMINAL, BILATERAL SALPINGO-OOPHORECTOMY, NODE DISSECTION, TUMOR DEBULKING;  Exploratory Laparotomy, Total Abdominal Hysterectomy, Bilateral Salpingo-Oophorectomy, appendectomy,lysis of adhesions, ileal, ascending, transverse and splenic flexure resection, ileal descending bowel renanastomosis, incidental cystotomy repair, CUSA procedure and colonoscopy ; [facility]     INSERT PORT PERITONEAL ACCESS  4/3/2012    Procedure:INSERT PORT PERITONEAL ACCESS; Intraperitoneal Port Placement (c-arm); Surgeon:SAMUEL CARRASCO; Location:[facility] OR     INSERT PORT PERITONEAL ACCESS  5/14/2014    Procedure: INSERT PORT PERITONEAL ACCESS;  Surgeon: Nga Yeung MD;  Location: [facility] OR     INSERT PORT VASCULAR ACCESS       LAPAROSCOPY DIAGNOSTIC (GYN)  5/14/2014    Procedure: LAPAROSCOPY DIAGNOSTIC (GYN);  Surgeon: Nga Yeung MD;  Location: [facility] OR     LAPAROTOMY EXPLORATORY Right 11/25/2015    Procedure: LAPAROTOMY EXPLORATORY;  Surgeon: Nga Yeung MD;  Location: [facility] OR     LAPAROTOMY, TUMOR DEBULKING, COMBINED  5/14/2014    Procedure: COMBINED LAPAROTOMY, TUMOR DEBULKING;  Surgeon: Nga Yeung MD;  Location: [facility] OR     REMOVE CATHETER PERITONEAL N/A 8/20/2014    Procedure: REMOVE CATHETER PERITONEAL;  Surgeon: Nga Yeung MD;  Location: [facility] OR     VASCULAR SURGERY      stent left iliac vein       Current Outpatient Prescriptions   Medication     study - niraparib (IDS# 4759) 100 mg capsule     study - niraparib (IDS# 4759) 100 mg  capsule     ferrous sulfate (IRON) 325 (65 FE) MG tablet     study - niraparib (IDS# 4759) 100 mg capsule     study - niraparib (IDS# 4759) 100 mg capsule     study - niraparib (IDS# 4759) 100 mg capsule     LEVOTHYROXINE SODIUM PO     iohexol (OMNIPAQUE) 140 MG/ML SOLN     order for DME     LORazepam (ATIVAN) 1 MG tablet     METFORMIN HCL PO     diphenoxylate-atropine (LOMOTIL) 2.5-0.025 MG per tablet     cyanocobalamin (VITAMIN B12) 1000 MCG/ML injection     ORDER FOR DME     magnesium oxide (MAG-OX) 400 MG tablet     ASPIRIN PO     potassium chloride (KLOR-CON) 20 MEQ packet     VITAMIN E NATURAL PO     Cholecalciferol (VITAMIN D PO)     HERBALS     Ascorbic Acid (VITAMIN C PO)     Calcium Carbonate-Vitamin D (CALCIUM + D PO)     No current facility-administered medications for this visit.      Facility-Administered Medications Ordered in Other Visits   Medication     heparin 100 UNIT/ML injection 5 mL     sodium chloride (PF) 0.9% PF flush 20 mL     heparin 100 UNIT/ML injection 500 Units          Allergies   Allergen Reactions     Nkda [No Known Drug Allergies]        Family History   Problem Relation Age of Onset     CANCER Mother 69     lung, smoker     CANCER Maternal Uncle 65     brain     Colon Cancer Maternal Aunt 80     colon       Social History     Social History     Marital status:      Spouse name: N/A     Number of children: N/A     Years of education: N/A     Occupational History     Not on file.     Social History Main Topics     Smoking status: Former Smoker     Years: 8.00     Types: Cigarettes     Quit date: 6/21/1980     Smokeless tobacco: Never Used      Comment: started at 11 yo and quit at 18 yo     Alcohol use Yes      Comment: 3x/day wine or jodi     Drug use: No     Sexual activity: Not on file     Other Topics Concern     Not on file     Social History Narrative       Review of Systems     Constitutional:  Positive for fatigue. Negative for fever, chills, weight loss, weight  gain, decreased appetite, night sweats, recent stressors, height gain, height loss, post-operative complications, incisional pain, hallucinations, increased energy, hyperactivity and confused.   HENT:  Positive for tinnitus. Negative for ear pain, hearing loss, nosebleeds, trouble swallowing, hoarse voice, mouth sores, sore throat, ear discharge, tooth pain, gum tenderness, taste disturbance, smell disturbance, hearing aid, bleeding gums, dry mouth, sinus pain, sinus congestion and neck mass.    Eyes:  Negative for double vision, pain, redness, eye pain, decreased vision, eye watering, eye bulging, eye dryness, flashing lights, spots, floaters, strabismus, tunnel vision, jaundice and eye irritation.   Respiratory:   Negative for cough, hemoptysis, sputum production, shortness of breath, wheezing, sleep disturbances due to breathing, snores loudly, respiratory pain, dyspnea on exertion, cough disturbing sleep and postural dyspnea.    Cardiovascular:  Negative for chest pain, dyspnea on exertion, palpitations, orthopnea, claudication, leg swelling, fingers/toes turn blue, hypertension, hypotension, syncope, history of heart murmur, chest pain on exertion, chest pain at rest, pacemaker, few scattered varicosities, leg pain, sleep disturbances due to breathing, tachycardia, light-headedness, exercise intolerance and edema.   Gastrointestinal:  Positive for diarrhea. Negative for heartburn, nausea, vomiting, abdominal pain, constipation, blood in stool, melena, rectal pain, bloating, hemorrhoids, bowel incontinence, jaundice, rectal bleeding, coffee ground emesis and change in stool.   Genitourinary:  Negative for bladder incontinence, dysuria, urgency, hematuria, flank pain, vaginal discharge, difficulty urinating, genital sores, dyspareunia, decreased libido, nocturia, voiding less frequently, arousal difficulty, abnormal vaginal bleeding, excessive menstruation, menstrual changes, hot flashes, vaginal dryness and  postmenopausal bleeding.   Musculoskeletal:  Negative for myalgias, back pain, joint swelling, arthralgias, stiffness, muscle cramps, neck pain, bone pain, muscle weakness and fracture.   Skin:  Negative for nail changes, itching, poor wound healing, rash, hair changes, skin changes, acne, warts, poor wound healing, scarring, flaky skin, Raynaud's phenomenon, sensitivity to sunlight and skin thickening.   Neurological:  Negative for dizziness, tingling, tremors, speech change, seizures, loss of consciousness, weakness, light-headedness, numbness, headaches, disturbances in coordination, extremity numbness, memory loss, difficulty walking and paralysis.   Endo/Heme:  Negative for anemia, swollen glands and bruises/bleeds easily.   Psychiatric/Behavioral:  Negative for depression, hallucinations, memory loss, decreased concentration, mood swings and panic attacks.    Breast:  Negative for breast discharge, breast mass, breast pain and nipple retraction.   Endocrine:  Negative for altered temperature regulation, polyphagia, polydipsia, unwanted hair growth and change in facial hair.        Physical Exam:    /61 (BP Location: Right arm, Patient Position: Chair, Cuff Size: Adult Regular)  Pulse 105  Temp 98.2  F (36.8  C) (Oral)  Wt 64.8 kg (142 lb 12.8 oz)  SpO2 100%  BMI 23.76 kg/m2    General: Alert non-toxic appearing female in no acute distress  HEENT: Normocephalic, atraumatic; PERRLA; no scleral icterus; oropharynx pink without lesions; neck supple without lymphadenopathy  Pulmonary: Lungs clear to auscultation, no increased work of breathing noted  Cardiac: Tachycardic, regular rhythm, S1S2, no clicks, murmurs, rubs, or gallops; bilateral lower extremities without edema  GI: Normoactive bowel sounds x4 quadrants, abdomen soft, non-distended, and non-tender to palpation without masses or organomegaly  : Not indicated  Heme: Cervical, supraclavicular, and inguinal nodes without lymphadenopathy  MSK:  Moves all extremities, no obvious muscle wasting  Neuro: No gross deficits, normal gait  Skin: Appropriate color for race, warm and dry, no rashes or lesions to unclothed skin  Psych: Pleasant and interactive, affect bright, makes appropriate eye contact, thought process linear    Labs:      4/19/2017   Hemoglobin 11.7 - 15.7 g/dL 11.7   Hematocrit 35.0 - 47.0 % 34.6 (A)   Platelet Count 150 - 450 10e9/L 226   Absolute Neutrophil 1.6 - 8.3 10e9/L 3.0   Sodium 133 - 144 mmol/L 141   Potassium 3.4 - 5.3 mmol/L 3.2 (A)   Chloride 94 - 109 mmol/L 105   Carbon Dioxide 20 - 32 mmol/L 26   Urea Nitrogen 7 - 30 mg/dL 18   Creatinine 0.52 - 1.04 mg/dL 1.04   Calcium 8.5 - 10.1 mg/dL 8.7   Magnesium 1.6 - 2.3 mg/dL 1.5 (A)   Bilirubin Total 0.2 - 1.3 mg/dL 0.3   ALT 0 - 50 U/L 25   AST 0 - 45 U/L 19   Alkaline Phosphatase 40 - 150 U/L 102   Albumin 3.4 - 5.0 g/dL 3.3 (A)   Protein Total 6.8 - 8.8 g/dL 6.8   LD 81 - 234 U/L 157   Amylase 30 - 110 U/L 33   WBC 4.0 - 11.0 10e9/L 5.8    pending      Assessment/Plan:  1) Recurrent stage IIIC bilateral ovarian cancer: Patient tolerating without dose limiting side effects. Proceed with cycle twelve niraparib. Due for CT CAP prior to next cycle per study guidelines- order placed. Fatigue manageable with activity and naps. ECOG 1. Reviewed signs and symptoms for when she should contact the clinic or seek additional care, including but not limited to fever, chills, inability to keep down food or fluids, nausea and vomiting not controlled with antiemetics, and diarrhea leading to dehydration. Patient to contact the clinic with any questions or concerns in the interim. Betzy Jones RN in for teaching and study follow up.   2) Electrolyte disturbance: Asymptomatic. To increase potassium chloride from 20mEq daily to 20mEq BID for the next month. To attempt to increase magnesium oxide from 400mg PO daily to twice daily if she can tolerate this without worsening diarrhea.  3) Genetic  counseling: Testing has been performed and she is negative for mutations in BRCA1, BRCA2, EPCAM, MLH1, MSH2, MSH6, PMS2, PTEN, and TP53 genes  4) Health maintenance issues discussed include to continue following up with PCP for non-gynecologic concerns.  5) Patient verbalized understanding of and agreement with plan    A total of 20 minutes spent with patient, over 50% spent in counseling and coordination of care.    HAILE De Paz, FNP-C  Family Nurse Practitioner  Gynecologic Oncology  [facility]  Pager: [phone]

## 2017-05-15 ASSESSMENT — ENCOUNTER SYMPTOMS
DIARRHEA: 1
SINUS PAIN: 0
TASTE DISTURBANCE: 0
CONSTIPATION: 0
HOARSE VOICE: 0
SKIN CHANGES: 0
BLOATING: 0
RECTAL BLEEDING: 0
SORE THROAT: 0
HEARTBURN: 0
TROUBLE SWALLOWING: 0
NAIL CHANGES: 0
NECK MASS: 0
ABDOMINAL PAIN: 0
RECTAL PAIN: 0
BOWEL INCONTINENCE: 0
SINUS CONGESTION: 0
POOR WOUND HEALING: 0
SMELL DISTURBANCE: 0
JAUNDICE: 0
VOMITING: 0
NAUSEA: 0
BLOOD IN STOOL: 0

## 2017-05-18 ENCOUNTER — ONCOLOGY VISIT (OUTPATIENT)
Dept: ONCOLOGY | Facility: CLINIC | Age: 57
End: 2017-05-18
Attending: OBSTETRICS & GYNECOLOGY
Payer: COMMERCIAL

## 2017-05-18 ENCOUNTER — RESEARCH ENCOUNTER (OUTPATIENT)
Dept: ONCOLOGY | Facility: CLINIC | Age: 57
End: 2017-05-18

## 2017-05-18 VITALS
SYSTOLIC BLOOD PRESSURE: 144 MMHG | HEIGHT: 65 IN | OXYGEN SATURATION: 99 % | HEART RATE: 100 BPM | RESPIRATION RATE: 16 BRPM | BODY MASS INDEX: 23.07 KG/M2 | TEMPERATURE: 98.6 F | WEIGHT: 138.45 LBS | DIASTOLIC BLOOD PRESSURE: 69 MMHG

## 2017-05-18 DIAGNOSIS — D70.2 DRUG-INDUCED NEUTROPENIA (H): ICD-10-CM

## 2017-05-18 DIAGNOSIS — C56.2 OVARIAN CANCER, LEFT (H): ICD-10-CM

## 2017-05-18 DIAGNOSIS — C56.1 OVARIAN CANCER, RIGHT (H): ICD-10-CM

## 2017-05-18 DIAGNOSIS — C56.1 OVARIAN CANCER, RIGHT (H): Primary | ICD-10-CM

## 2017-05-18 LAB
ALBUMIN SERPL-MCNC: 3.7 G/DL (ref 3.4–5)
ALBUMIN UR-MCNC: NEGATIVE MG/DL
ALP SERPL-CCNC: 121 U/L (ref 40–150)
ALT SERPL W P-5'-P-CCNC: 23 U/L (ref 0–50)
AMYLASE SERPL-CCNC: 30 U/L (ref 30–110)
ANION GAP SERPL CALCULATED.3IONS-SCNC: 12 MMOL/L (ref 3–14)
APPEARANCE UR: CLEAR
APTT PPP: 22 SEC (ref 22–37)
AST SERPL W P-5'-P-CCNC: 21 U/L (ref 0–45)
BASOPHILS # BLD AUTO: 0 10E9/L (ref 0–0.2)
BASOPHILS NFR BLD AUTO: 0.6 %
BILIRUB SERPL-MCNC: 0.5 MG/DL (ref 0.2–1.3)
BILIRUB UR QL STRIP: NEGATIVE
BUN SERPL-MCNC: 14 MG/DL (ref 7–30)
CALCIUM SERPL-MCNC: 8.9 MG/DL (ref 8.5–10.1)
CANCER AG125 SERPL-ACNC: 191 U/ML (ref 0–30)
CHLORIDE SERPL-SCNC: 103 MMOL/L (ref 94–109)
CO2 SERPL-SCNC: 24 MMOL/L (ref 20–32)
COLOR UR AUTO: NORMAL
CREAT SERPL-MCNC: 0.73 MG/DL (ref 0.52–1.04)
DIFFERENTIAL METHOD BLD: ABNORMAL
EOSINOPHIL # BLD AUTO: 0.1 10E9/L (ref 0–0.7)
EOSINOPHIL NFR BLD AUTO: 1.7 %
ERYTHROCYTE [DISTWIDTH] IN BLOOD BY AUTOMATED COUNT: 13.1 % (ref 10–15)
GFR SERPL CREATININE-BSD FRML MDRD: 82 ML/MIN/1.7M2
GGT SERPL-CCNC: 24 U/L (ref 0–40)
GLUCOSE SERPL-MCNC: 109 MG/DL (ref 70–99)
GLUCOSE UR STRIP-MCNC: NEGATIVE MG/DL
HCT VFR BLD AUTO: 35.6 % (ref 35–47)
HGB BLD-MCNC: 12 G/DL (ref 11.7–15.7)
HGB UR QL STRIP: NEGATIVE
IMM GRANULOCYTES # BLD: 0 10E9/L (ref 0–0.4)
IMM GRANULOCYTES NFR BLD: 0.4 %
INR PPP: 0.92 (ref 0.86–1.14)
KETONES UR STRIP-MCNC: NEGATIVE MG/DL
LDH SERPL L TO P-CCNC: 158 U/L (ref 81–234)
LEUKOCYTE ESTERASE UR QL STRIP: NEGATIVE
LYMPHOCYTES # BLD AUTO: 2.1 10E9/L (ref 0.8–5.3)
LYMPHOCYTES NFR BLD AUTO: 28.6 %
MAGNESIUM SERPL-MCNC: 1.6 MG/DL (ref 1.6–2.3)
MCH RBC QN AUTO: 36.4 PG (ref 26.5–33)
MCHC RBC AUTO-ENTMCNC: 33.7 G/DL (ref 31.5–36.5)
MCV RBC AUTO: 108 FL (ref 78–100)
MONOCYTES # BLD AUTO: 0.7 10E9/L (ref 0–1.3)
MONOCYTES NFR BLD AUTO: 10.2 %
NEUTROPHILS # BLD AUTO: 4.2 10E9/L (ref 1.6–8.3)
NEUTROPHILS NFR BLD AUTO: 58.5 %
NITRATE UR QL: NEGATIVE
NRBC # BLD AUTO: 0 10*3/UL
NRBC BLD AUTO-RTO: 0 /100
PH UR STRIP: 6 PH (ref 5–7)
PLATELET # BLD AUTO: 229 10E9/L (ref 150–450)
POTASSIUM SERPL-SCNC: 3.5 MMOL/L (ref 3.4–5.3)
PROT SERPL-MCNC: 7.3 G/DL (ref 6.8–8.8)
RBC # BLD AUTO: 3.3 10E12/L (ref 3.8–5.2)
RBC #/AREA URNS AUTO: 0 /HPF (ref 0–2)
SODIUM SERPL-SCNC: 138 MMOL/L (ref 133–144)
SP GR UR STRIP: 1 (ref 1–1.03)
SQUAMOUS #/AREA URNS AUTO: <1 /HPF (ref 0–1)
URN SPEC COLLECT METH UR: NORMAL
UROBILINOGEN UR STRIP-MCNC: 0 MG/DL (ref 0–2)
WBC # BLD AUTO: 7.2 10E9/L (ref 4–11)
WBC #/AREA URNS AUTO: <1 /HPF (ref 0–2)

## 2017-05-18 PROCEDURE — 83615 LACTATE (LD) (LDH) ENZYME: CPT | Performed by: OBSTETRICS & GYNECOLOGY

## 2017-05-18 PROCEDURE — 81001 URINALYSIS AUTO W/SCOPE: CPT | Performed by: OBSTETRICS & GYNECOLOGY

## 2017-05-18 PROCEDURE — 36591 DRAW BLOOD OFF VENOUS DEVICE: CPT

## 2017-05-18 PROCEDURE — 83735 ASSAY OF MAGNESIUM: CPT | Performed by: OBSTETRICS & GYNECOLOGY

## 2017-05-18 PROCEDURE — 99212 OFFICE O/P EST SF 10 MIN: CPT | Mod: ZF

## 2017-05-18 PROCEDURE — 86304 IMMUNOASSAY TUMOR CA 125: CPT | Performed by: OBSTETRICS & GYNECOLOGY

## 2017-05-18 PROCEDURE — 85025 COMPLETE CBC W/AUTO DIFF WBC: CPT | Performed by: OBSTETRICS & GYNECOLOGY

## 2017-05-18 PROCEDURE — 25000128 H RX IP 250 OP 636: Mod: ZF | Performed by: OBSTETRICS & GYNECOLOGY

## 2017-05-18 PROCEDURE — 82977 ASSAY OF GGT: CPT | Performed by: OBSTETRICS & GYNECOLOGY

## 2017-05-18 PROCEDURE — 82150 ASSAY OF AMYLASE: CPT | Performed by: OBSTETRICS & GYNECOLOGY

## 2017-05-18 PROCEDURE — 80053 COMPREHEN METABOLIC PANEL: CPT | Performed by: OBSTETRICS & GYNECOLOGY

## 2017-05-18 PROCEDURE — 85610 PROTHROMBIN TIME: CPT | Performed by: OBSTETRICS & GYNECOLOGY

## 2017-05-18 PROCEDURE — 99214 OFFICE O/P EST MOD 30 MIN: CPT | Mod: GC | Performed by: OBSTETRICS & GYNECOLOGY

## 2017-05-18 PROCEDURE — 85730 THROMBOPLASTIN TIME PARTIAL: CPT | Performed by: OBSTETRICS & GYNECOLOGY

## 2017-05-18 RX ORDER — HEPARIN SODIUM (PORCINE) LOCK FLUSH IV SOLN 100 UNIT/ML 100 UNIT/ML
5 SOLUTION INTRAVENOUS
Status: DISCONTINUED | OUTPATIENT
Start: 2017-05-18 | End: 2017-06-24 | Stop reason: HOSPADM

## 2017-05-18 RX ADMIN — SODIUM CHLORIDE, PRESERVATIVE FREE 5 ML: 5 INJECTION INTRAVENOUS at 12:48

## 2017-05-18 ASSESSMENT — ENCOUNTER SYMPTOMS
LOSS OF CONSCIOUSNESS: 0
BREAST PAIN: 0
DOUBLE VISION: 0
LIGHT-HEADEDNESS: 0
MYALGIAS: 0
COUGH DISTURBING SLEEP: 0
EXTREMITY NUMBNESS: 0
HALLUCINATIONS: 0
FLANK PAIN: 0
SNORES LOUDLY: 0
COUGH: 0
WEIGHT GAIN: 0
EYE IRRITATION: 0
CLAUDICATION: 0
DECREASED APPETITE: 0
NAIL CHANGES: 0
SYNCOPE: 0
JOINT SWELLING: 0
BLOOD IN STOOL: 0
ABDOMINAL PAIN: 0
BREAST MASS: 0
BRUISES/BLEEDS EASILY: 0
RESPIRATORY PAIN: 0
SPUTUM PRODUCTION: 0
PANIC: 0
SORE THROAT: 0
EYE WATERING: 0
SHORTNESS OF BREATH: 0
NECK PAIN: 0
EYE PAIN: 0
SEIZURES: 0
HEMATURIA: 0
PARALYSIS: 0
POLYDIPSIA: 0
POOR WOUND HEALING: 0
WEAKNESS: 0
SLEEP DISTURBANCES DUE TO BREATHING: 0
TROUBLE SWALLOWING: 0
SINUS PAIN: 0
HYPERTENSION: 0
CONSTIPATION: 0
ALTERED TEMPERATURE REGULATION: 0
INCREASED ENERGY: 0
HEMOPTYSIS: 0
HEADACHES: 0
SPEECH CHANGE: 0
NECK MASS: 0
TASTE DISTURBANCE: 0
RECTAL BLEEDING: 0
INSOMNIA: 0
WEIGHT LOSS: 0
PALPITATIONS: 0
NIGHT SWEATS: 0
FATIGUE: 0
DISTURBANCES IN COORDINATION: 0
DECREASED LIBIDO: 0
POSTURAL DYSPNEA: 0
SINUS CONGESTION: 0
BLOATING: 0
VOMITING: 0
NUMBNESS: 0
DECREASED CONCENTRATION: 0
HOARSE VOICE: 0
LEG PAIN: 0
NERVOUS/ANXIOUS: 0
JAUNDICE: 0
DIZZINESS: 0
CHILLS: 0
HOT FLASHES: 0
MUSCLE CRAMPS: 0
WHEEZING: 0
SMELL DISTURBANCE: 0
RECTAL PAIN: 0
STIFFNESS: 0
SWOLLEN GLANDS: 0
SKIN CHANGES: 0
BOWEL INCONTINENCE: 0
EXERCISE INTOLERANCE: 0
DIFFICULTY URINATING: 0
MUSCLE WEAKNESS: 0
DEPRESSION: 0
EYE REDNESS: 0
ARTHRALGIAS: 0
TACHYCARDIA: 0
DIARRHEA: 0
POLYPHAGIA: 0
ORTHOPNEA: 0
HYPOTENSION: 0
DYSURIA: 0
DYSPNEA ON EXERTION: 0
TINGLING: 0
TREMORS: 0
BACK PAIN: 0
FEVER: 0
HEARTBURN: 0
NAUSEA: 0
MEMORY LOSS: 0
LEG SWELLING: 0

## 2017-05-18 ASSESSMENT — PAIN SCALES - GENERAL: PAINLEVEL: NO PAIN (0)

## 2017-05-18 NOTE — MR AVS SNAPSHOT
After Visit Summary   5/18/2017    Odalys Nathan    MRN: [id number]           Patient Information     Date Of Birth          1960        Visit Information        Provider Department      5/18/2017 12:40 PM Nga Yeung MD [facility]        Today's Diagnoses     Ovarian cancer, right (H)    -  1    Ovarian cancer, left (H)        Drug-induced neutropenia (H)           Follow-ups after your visit        Your next 10 appointments already scheduled     Jul 13, 2017  8:30 AM CDT   [facility] Lab Draw with  [employer] LAB DRAW   [facility] Lab Draw ([facility])    [address] [place] [address] [id number] [phone]            Jul 13, 2017  9:00 AM CDT   (Arrive by 8:45 AM)   Return Active Treatment with HAILE Meza CNP   [facility] ([facility])    [address] [place] [address] [id number] [phone]              Who to contact     If you have questions or need follow up information about today's clinic visit or your schedule please contact [facility], located within [facility] directly at [phone].  Normal or non-critical lab and imaging results will be communicated to you by MyChart, letter or phone within 4 business days after the clinic has received the results. If you do not hear from us within 7 days, please contact the clinic through [employer] or phone. If you have a critical or abnormal lab result, we will notify you by phone as soon as possible.  Submit refill requests through [employer] or call your pharmacy and they will forward the refill request to us. Please allow 3 business days for your refill to be completed.          Additional Information About Your Visit        [employer] Information     [employer] gives you secure access to your electronic health record. If you see a primary care provider, you can also send messages to your care team and  "make appointments. If you have questions, please call your primary care clinic.  If you do not have a primary care provider, please call [phone] and they will assist you.        Care EveryWhere ID     This is your Care EveryWhere ID. This could be used by other organizations to access your [place] medical records  [id number]        Your Vitals Were     Pulse Temperature Respirations Height Pulse Oximetry BMI (Body Mass Index)    100 98.6  F (37  C) (Oral) 16 1.651 m (5' 5\") 99% 23.04 kg/m2       Blood Pressure from Last 3 Encounters:   06/15/17 146/83   05/18/17 144/69   04/19/17 135/61    Weight from Last 3 Encounters:   06/15/17 62.4 kg (137 lb 8 oz)   05/18/17 62.8 kg (138 lb 7.2 oz)   04/19/17 64.8 kg (142 lb 12.8 oz)              We Performed the Following     Amylase          CBC with platelets differential     Comprehensive metabolic panel     GGT     Lactate Dehydrogenase     Magnesium     Routine UA with microscopic          Today's Medication Changes          These changes are accurate as of: 5/18/17 11:59 PM.  If you have any questions, ask your nurse or doctor.               These medicines have changed or have updated prescriptions.        Dose/Directions    cyanocobalamin 1000 MCG/ML injection   Commonly known as:  VITAMIN B12   This may have changed:  when to take this   Used for:  B12 deficiency        Dose:  1 mL   Inject 1 mL (1,000 mcg) into the muscle every 30 days   Quantity:  1 mL   Refills:  11       magnesium oxide 400 MG tablet   Commonly known as:  MAG-OX   This may have changed:  when to take this   Used for:  Ovarian cancer, unspecified laterality (H)        Dose:  400 mg   Take 1 tablet (400 mg) by mouth 2 times daily   Quantity:  90 tablet   Refills:  3       * study - niraparib 100 mg capsule   Commonly known as:  IDS# [id number]   This may have changed:  Another medication with the same name was added. Make sure you understand how and when to take each.   Used for:  Ovarian " cancer, right (H), Ovarian cancer, left (H), Drug-induced neutropenia (H)   Changed by:  Patricia Rocha APRN CNP        Dose:  200 mg   Take 2 capsules (200 mg) by mouth every morning Take at the same time each day, preferably morning. Swallow whole and do NOT chew capsules. Food and water is permissible. First dose will be administered on site.   Quantity:  84 capsule   Refills:  0       * study - niraparib 100 mg capsule   Commonly known as:  IDS# [id number]   This may have changed:  Another medication with the same name was added. Make sure you understand how and when to take each.   Used for:  Ovarian cancer, right (H), Ovarian cancer, left (H)   Changed by:  Patricia Rocha APRN CNP        Dose:  200 mg   Take 2 capsules (200 mg) by mouth every morning Take at the same time each day, preferably morning. Swallow whole and do NOT chew capsules. Food and water is permissible. First dose will be administered on site.   Quantity:  84 capsule   Refills:  0       * study - niraparib 100 mg capsule   Commonly known as:  IDS# [id number]   This may have changed:  You were already taking a medication with the same name, and this prescription was added. Make sure you understand how and when to take each.   Used for:  Ovarian cancer, right (H), Ovarian cancer, left (H), Drug-induced neutropenia (H)   Changed by:  Nga Yeung MD        Dose:  200 mg   Take 2 capsules (200 mg) by mouth every morning Take at the same time each day, preferably morning. Swallow whole and do NOT chew capsules. Food and water is permissible. First dose will be administered on site.   Quantity:  84 capsule   Refills:  0       * Notice:  This list has 3 medication(s) that are the same as other medications prescribed for you. Read the directions carefully, and ask your doctor or other care provider to review them with you.         Where to get your medicines      Some of these will need a paper prescription and others can be bought over the  counter.  Ask your nurse if you have questions.     Bring a paper prescription for each of these medications     study - niraparib 100 mg capsule                Primary Care Provider Office Phone # Fax #    Aubrie Hester [phone]       [facility] [address] [address] [facility] [address]        Equal Access to Services     SANDY CHAMBERS : Hadii aad ku hadasho Soomaali, waaxda luqadaha, qaybta kaalmada adeegyada, blanka archibaldin hayaan adenelson khmotoshia beckman . So [facility] [phone].    ATENCIÓN: Si habla español, tiene a bell disposición servicios gratuitos de asistencia lingüística. Brijeshame al [phone].    We comply with applicable federal civil rights laws and [place] laws. We do not discriminate on the basis of race, color, national origin, age, disability sex, sexual orientation or gender identity.            Thank you!     Thank you for choosing [facility]  for your care. Our goal is always to provide you with excellent care. Hearing back from our patients is one way we can continue to improve our services. Please take a few minutes to complete the written survey that you may receive in the mail after your visit with us. Thank you!             Your Updated Medication List - Protect others around you: Learn how to safely use, store and throw away your medicines at www.disposemymeds.org.          This list is accurate as of: 5/18/17 11:59 PM.  Always use your most recent med list.                   Brand Name Dispense Instructions for use Diagnosis    ASPIRIN PO      Take 325 mg by mouth daily        CALCIUM + D PO      Take 1 tablet by mouth daily.    Pelvic mass       cyanocobalamin 1000 MCG/ML injection    VITAMIN B12    1 mL    Inject 1 mL (1,000 mcg) into the muscle every 30 days    B12 deficiency       diphenoxylate-atropine 2.5-0.025 MG per tablet    LOMOTIL    60 tablet    Take 2 tablets by mouth 4 times daily as needed for diarrhea    Acute diarrhea        HERBALS      daily        LEVOTHYROXINE SODIUM PO      Take by mouth daily        LORazepam 1 MG tablet    ATIVAN    30 tablet    Take 1 tablet (1 mg) by mouth every 6 hours as needed (Anxiety, Nausea/Vomiting or Sleep)    Ovarian cancer, unspecified laterality (H), Drug induced neutropenia(288.03)       magnesium oxide 400 MG tablet    MAG-OX    90 tablet    Take 1 tablet (400 mg) by mouth 2 times daily    Ovarian cancer, unspecified laterality (H)       METFORMIN HCL PO      Take 500 mg by mouth daily        order for DME     3 each    Injection Supplies for Vitamin B12: 3cc syringes w/ 27 gauge needles, 1/2 inch length    B12 deficiency       potassium chloride 20 MEQ Packet    KLOR-CON     Take 20 mEq by mouth daily        * study - niraparib 100 mg capsule    IDS# [id number]    84 capsule    Take 2 capsules (200 mg) by mouth every morning Take at the same time each day, preferably morning. Swallow whole and do NOT chew capsules. Food and water is permissible. First dose will be administered on site.    Ovarian cancer, right (H), Ovarian cancer, left (H), Drug-induced neutropenia (H)       * study - niraparib 100 mg capsule    IDS# [id number]    84 capsule    Take 2 capsules (200 mg) by mouth every morning Take at the same time each day, preferably morning. Swallow whole and do NOT chew capsules. Food and water is permissible. First dose will be administered on site.    Ovarian cancer, right (H), Ovarian cancer, left (H)       * study - niraparib 100 mg capsule    IDS# [id number]    84 capsule    Take 2 capsules (200 mg) by mouth every morning Take at the same time each day, preferably morning. Swallow whole and do NOT chew capsules. Food and water is permissible. First dose will be administered on site.    Ovarian cancer, right (H), Ovarian cancer, left (H), Drug-induced neutropenia (H)       VITAMIN C PO      Take 500 mg by mouth daily    Pelvic mass       VITAMIN D PO      Take 1,000 Units by mouth daily Unknown dose         VITAMIN E NATURAL PO      Take 100 Units by mouth daily        * Notice:  This list has 3 medication(s) that are the same as other medications prescribed for you. Read the directions carefully, and ask your doctor or other care provider to review them with you.

## 2017-05-18 NOTE — NURSING NOTE
"Oncology Rooming Note    May 18, 2017 1:19 PM   Odalys Nathan is a 56 year old female who presents for:    Chief Complaint   Patient presents with     Port Draw     Labs drawn from Right Chest Port-a-Cath and line flushed with Saline and Heparin.      Oncology Clinic Visit     return patient visit for ct results related to Ovarian cancer, right (H)     Initial Vitals: /69  Pulse 100  Temp 98.6  F (37  C) (Oral)  Resp 16  Ht 1.651 m (5' 5\")  Wt 62.8 kg (138 lb 7.2 oz)  SpO2 99%  BMI 23.04 kg/m2 Estimated body mass index is 23.04 kg/(m^2) as calculated from the following:    Height as of this encounter: 1.651 m (5' 5\").    Weight as of this encounter: 62.8 kg (138 lb 7.2 oz). Body surface area is 1.7 meters squared.  No Pain (0) Comment: Data Unavailable   No LMP recorded. Patient has had a hysterectomy.  Allergies reviewed: Yes  Medications reviewed: Yes    Medications: Medication refills not needed today.  Pharmacy name entered into [employer]:    [id number] - [place], [place] - [address] [facility] - [place], [place] - [address]  [facility] - [place], [place] - [address] [place] [address]    Clinical concerns: none dr. peterson was notified.    5 minutes for nursing intake (face to face time)     Dre Perdomo CMA              "

## 2017-05-18 NOTE — NURSING NOTE
Patient here today for C13D1 visit for the Tesaro/Quadra Niraparib study.  Patient denies any issues or concerns at this time.  Patient's labs per reviewed and ok to continue on current dose of study medication.  Patient returned 35 tabs from bottle number [id number] along with pill diary.  Pills taken match pill diary.  Patient was dispensed bottle number [id number] along with new diary.  Patient's next appointments made per protocol.  Patient was re-consented for the study due to the latest amendment Protocol [id number]  IRB # [id number] Consent date 4/4/2017.  NCT #[id number].  Copy of signed consent given to patient and copy sent to scan.  Patient instructed to call with any questions or concerns.  Patient voiced understanding.      Prudence Jones RN     Pager [phone]

## 2017-05-18 NOTE — NURSING NOTE
Chief Complaint   Patient presents with     Port Draw     Labs drawn from Right Chest Port-a-Cath and line flushed with Saline and Heparin.      Patti Davies RN

## 2017-05-18 NOTE — LETTER
5/18/2017       RE: Odalys Nathan  [id number]  [facility] [id number]     Dear Colleague,    Thank you for referring your patient, Odalys Nathan, to the [facility]. Please see a copy of my visit note below.      Review of Systems     Constitutional:  Negative for fever, chills, weight loss, weight gain, fatigue, decreased appetite, night sweats, recent stressors, height gain, height loss, post-operative complications, incisional pain, hallucinations, increased energy, hyperactivity and confused.   HENT:  Negative for ear pain, hearing loss, tinnitus, nosebleeds, trouble swallowing, hoarse voice, mouth sores, sore throat, ear discharge, tooth pain, gum tenderness, taste disturbance, smell disturbance, hearing aid, bleeding gums, dry mouth, sinus pain, sinus congestion and neck mass.    Eyes:  Negative for double vision, pain, redness, eye pain, decreased vision, eye watering, eye bulging, eye dryness, flashing lights, spots, floaters, strabismus, tunnel vision, jaundice and eye irritation.   Respiratory:   Negative for cough, hemoptysis, sputum production, shortness of breath, wheezing, sleep disturbances due to breathing, snores loudly, respiratory pain, dyspnea on exertion, cough disturbing sleep and postural dyspnea.    Cardiovascular:  Negative for chest pain, dyspnea on exertion, palpitations, orthopnea, claudication, leg swelling, fingers/toes turn blue, hypertension, hypotension, syncope, history of heart murmur, chest pain on exertion, chest pain at rest, pacemaker, few scattered varicosities, leg pain, sleep disturbances due to breathing, tachycardia, light-headedness, exercise intolerance and edema.   Gastrointestinal:  Negative for heartburn, nausea, vomiting, abdominal pain, diarrhea, constipation, blood in stool, melena, rectal pain, bloating, hemorrhoids, bowel incontinence, jaundice, rectal bleeding, coffee ground emesis and change in stool.   Genitourinary:   Negative for bladder incontinence, dysuria, urgency, hematuria, flank pain, vaginal discharge, difficulty urinating, genital sores, dyspareunia, decreased libido, nocturia, voiding less frequently, arousal difficulty, abnormal vaginal bleeding, excessive menstruation, menstrual changes, hot flashes, vaginal dryness and postmenopausal bleeding.   Musculoskeletal:  Negative for myalgias, back pain, joint swelling, arthralgias, stiffness, muscle cramps, neck pain, bone pain, muscle weakness and fracture.   Skin:  Negative for nail changes, itching, poor wound healing, rash, hair changes, skin changes, acne, warts, poor wound healing, scarring, flaky skin, Raynaud's phenomenon, sensitivity to sunlight and skin thickening.   Neurological:  Negative for dizziness, tingling, tremors, speech change, seizures, loss of consciousness, weakness, light-headedness, numbness, headaches, disturbances in coordination, extremity numbness, memory loss, difficulty walking and paralysis.   Endo/Heme:  Negative for anemia, swollen glands and bruises/bleeds easily.   Psychiatric/Behavioral:  Negative for depression, hallucinations, memory loss, decreased concentration, mood swings and panic attacks.    Breast:  Negative for breast discharge, breast mass, breast pain and nipple retraction.   Endocrine:  Negative for altered temperature regulation, polyphagia, polydipsia, unwanted hair growth and change in facial hair.    Gynecologic Oncology Follow-Up Note  RE: Odalys Nathan  MRN: [id number]  : 1960  Date of Visit: 2017    CC: Odalys Nathan is a 56 year old year old female with recurrent stage IIIC bilateral ovarian cancer who presents today for follow up regarding disease management while on the TESARO niraparib trial.    HPI: Odalys returns today infollo up for her recurrent ovarian cancer. She is currently on a clinical trial with niraparib and tolerating it well. She has been on it for nearly a year now. She  noticed in [place] when she was out in the sun that she got extremely itchy on her back that was quite bothersome to her and required a cold shower to resolve. This happened a second time here while in the sun. She was not sure if it was related to the medication. She otherwise denies any major symptoms. She has had no fevers or chills. Her energy level is fair, not 100% but she is able to remain active. She is eating well. She has baseline diarrhea from prior surgery which is unchanged.       Brief Oncology History:  1/18/2012 - Admitted to hospital for 2 weeks of intermittent abdominal cramping, distention, diarrhea and N/V. CT of abdomen/pelvis significant for small bowel obstruction, a heterogenous soft tissue density in the pelvis, omental nodules, and ascites. Bilateral adnexal masses per U/S of pelvis. CA-125 was elevated at 987, CEA was normal at 1.0.    1/18/12 - Therapeutic paracentesis (4 L) with cytology confirming malignancy (SD positive, weak ER positive, CK-7 positive consistent with GYN primary). Surgery recommended d/t potential for falling blood counts 2/2 chemotherapy (patient is Tenriism and limiting blood transfusion)    2/1/12 - Exploratory laparotomy, MAR BSO, lysis of adhesion, Appendectomy, Repair cystotomy, Omentectomy Kehy-lgfjyv-kduumoujh-transverse-colon resection, Ileo-descending colon anastomosis, CUSA, & Colonoscopy (done by Dr. Amato and colorectal team).  2/20/2012 - Admitted to hospital for bilateral pulmonary emboli and drainage of pleural effusion. Started on Lovenox.  2/28/12-3/21/12: Cycle #1-2 Carbo/Taxol IV  3/29/12 - Started on Keflex by her PCP for infection in her healing wound (immediately below her umbilicus).    4/11/12-6/14/12: Cycle #3-6 IV chemo, Cycle #1 IV/IP chemo  7/16/12 -  8, CT PRADEEP - enrolled in  - observation arm  3/4/13-6/28/13:  6, 9, 12, 15, 20.  Decision made to start Doxil/Carbo.  7/11/13: The left ventricular ejection fraction  "is normal at 66.4%.  7/12/13-9/6/13: Cycle #1-3 Doxil/Carbo.  10, 11.    2/20/14: Decision to take break from chemo for two months, followed by CT and CA-125.    4/21/14  27  exploratory laparotomy and removal of mesenteric masses, tumor debulking, peritoneal biopsies and intraperitoneal port placement. On laparoscopy, it was noted that there were small nodules on the anterior abdominal wall near the previous incision, small nodules on the right pelvic sidewall as well. Nodules were palpated in the mesentery; however, as it was unable to clarify where the origin of the nodules was, the decision was made to open the patient. On opening there was found to be approximately a 3 cm nodule in the small bowel mesentery and another separate approximately 2 cm nodule in the bowel mesentery. Pelvis without evidence of cancer, some mesenteric lymph nodes were palpated. No evidence otherwise of any disseminated cancer throughout the abdomen.    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 (2/2)  -Largest metastasis: 1.3 cm  -See comment  D: Peritoneum, right paracolic gutter #2, biopsy:  -Fibroadipose tissue with granulomatous inflammation surrounding refractile material  -Negative for malignancy   E: Small bowel adhesion, biopsy:  -Fibroconnective tissue, consistent with adhesion  -Negative for malignancy  F: Lymph nodes, mesentry, not otherwise specified, excision:  -Two lymph nodes, negative for metastatic carcinoma (0/2)  G: Lymph node, mesentery, \"#2\", excision:  -One lymph node, negative for metastatic carcinoma (0/1)  H: Lymph nodes, mesentery, \"nodule #2\", " excision:  -Five lymph nodes, negative for metastatic carcinoma (0/5)  COMMENT:  Some of the specimens show post-operative changes. Others show possible treatment related changes, including necrosis. The metastatic carcinoma in the mesenteric lymph nodes (specimen C) shows variable morphology, including relatively low grade tumor with papillary architecture, and high grade tumor comprised of nests of tumor cells with irregular, slit-like spaces and marked nuclear pleomorphism.    5/29/14: Cycle 1 IV PACLitaxel / IP CISplatin / IP PACLitaxel.  - 28.  6/26/14: Cycle #2 IV/IP.  10  8/5/14: CT chest/abd/pelvis IMPRESSION     1. In this patient with ovarian cancer, overall findings are indicative of stable/slight improvement, as multiple mesenteric lymphadenopathy and scattered nodular peritoneal soft tissue mass lesions appear unchanged or slightly smaller since 4/21/2014.    2. 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 Mild dilatation of the second and the third portion of the duodenum with a narrow SMA angle. This could represent SMA syndrome, if clinically correlated.17/14:    Cycle #3 IV/IP.  10.    8/7/14: Cycle #4 Taxol/Carbo (changed from IV/IP).  10. She has been feeling okay. She is unsure if she can finish out the course of 6 cycles IV/IP taxol/cisplatin. She feels like she has the flu for about a week then starts feeling gradually better after each chemo cycle. Her spouse notes that she actually has been more sick with the treatments than she initially admits here. She was also previously having some rib pain. Denies any rib pain now. Denies any chest pain or shortness of breath.  8/20/14: Remove Intraperitoneal Port ( Port and catheter intact - discarded)  8/28/14: Cycle #5 Taxol/Carbo held due to thrombocytopenia.  6.    9/29/14: Cycle #6 Taxol/Carbo  6. Insurance questions regarding GSF coverage today. No concerns other than fatigue. Taking iron for  anemia and does not desire blood transfusion. Using neulasta for neutropenia. Using home IV hydration if needed. Baseline unchanged. No abdominal bloating, constipation, diarrhea, pain, vaginal or rectal bleeding, cough or dyspnea, fluid retention.     10/20/14:  5. CT chest/abdomen/pelvis on 10/16/14 showed nodular peritoneal soft tissue mass in the right lower quadrant adjacent to the psoas muscle is no longer appreciated. Adjacent prominent lymphadenopathy is unchanged from previous exam. 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  1/27/15:  6.  4/28/15:  14.  5/26/15:  18.    6/2/15: CT cap Impression:  1. Postsurgical changes of hysterectomy and bilateral salpingo-oophorectomy for ovarian cancer. There is a new 8 mm hazy, ill-defined hypoattenuating lesion in hepatic segment 6 which is suspicious for a metastatic deposit. Further evaluation with ultrasound in recommended.    2. Increased size of a left retroperitoneal lymph node which is indeterminate but may represent a ciro metastasis. Mildly prominent lymph nodes in the right lower quadrant are not significantly changed.  3. Moderate colonic stool burden.    6/4/15: US abdomen IMPRESSION:    Hyperechoic lesion in the right hepatic lobe, consistent with hemangioma. This does not corresponds to the area of the lesion seen on CT from 6/2/2015. An MR would be helpful for identifying and characterizing the lesion from the recent CT.  6/12/15: MR abd IMPRESSION:  1. New 20 x 11 mm enhancing lesions between the right obliques, concerning for metastatic disease. This lesions should be amenable to percutaneous biopsy, if indicated.  2. Correlating to the lesion visualized on comparison CT is a hepatic segment 6 subcapsular 7 mm lesion. Overall the appearance favors the diagnosis of a simple cyst. However, there is faint suggestion of mild peripheral arterial enhancement. Although this is favored  as  artifactual, this should be followed up to confirm stability. Recommend 6 month followup.  3. Hepatic segment 6, 5 mm lesion too small to technically characterize. Differential would favor FNH, less likely flash filling hemangioma. Recommend attention on followup.    9/1/15: Cycle #1 Avastin/Cytoxan.   31.  9/24/15: Cycle #2 Avastin/Cytoxan.  19.  10/15/15: Cycle #3 Avastin/Cytoxan.  16.     11/6/15: CT c/a/p IMPRESSION:    1. Stable postoperative change of MAR/BSO for ovarian cancer.  2. The lesion in the right flank abdominal musculature is slightly decreased in size. Otherwise, stable examination.  2. No evidence of metastatic disease in the chest.    11/20/15:  16    11/25/15: surgical pathology report  FINAL DIAGNOSIS:  Soft tissue, right oblique muscle mass, excision:  -Recurrent ovarian serous carcinoma  -Carcinoma is present less than 1 mm from one resection margin  -Background skeletal muscle and fibroadipose tissue    1/4/16:  22  1/11/16-1/27/16: Radiation to right flank x 12 treatments    4/7/2016:  94. CT cap IMPRESSION:    In this patient with ovarian cancer status post MAR/BSO and descending/transverse colectomy:  1. No evidence for malignancy in the chest, abdomen, or pelvis.  2. Stable small hypodense segment 6 liver lesion, appears more likely benign, possibly a cyst.    5/13/16:  124.    6/3/16: PET CT IMPRESSION: In this patient with a history of ovarian cancer:  1. Hypermetabolic and enlarging periaortic and perihepatic lymphadenopathy compatible with metastatic disease, as detailed above.  2. Although hypodense lesion in hepatic segment 6 has been present since 6/2/2015 associated hypermetabolism makes this lesion highly concerning for metastatic disease.    6/9/16:  137.  6/14/16: Cycle #1 Niraparib.    6/28/16: Cycle #1 D15 Niraparib.    7/5/16:  100.    7/11/16: Cycle #2 Niraparib.   83.    8/3/2016: PET CT IMPRESSION:    In  this patient with known history of ovarian cancer:  1) New pleural based nodular opacities in the lateral and inferior aspects of the bilateral lower lobes, worse in the left lung. Likely infection. Close follow up is recommended.      2) Slight decrease in hypermetabolic abdominal lymphadenopathy. 2 hypermetabolic lymph nodes persist.  3) Unchanged right hepatic lobe metastatic lesion.     8/9/16: Cycle #3 Niraparib.  69.  9/6/16: Cycle #4 Niraparib.  53. Dose held due to anemia.  9/13/16: Eval for potential cycle 4 niraparib. Dose held due to anemia.    10/4/16: CT CAP impression:   In this patient with a known history of ovarian cancer:  1. There has been interval resolution of pleural-based nodular  opacities which likely represented infection.  2. Abdominal lymphadenopathy in the form of 2 portacaval lymph nodes  have not significantly changed in size, noted to be hypermetabolic on  prior PET/CT.  3. Previously demonstrated metastatic lesion in the right lobe of the  liver is not significantly changed.  10/11/16:  60  11/1/16: C6 niraparib,  75  11/29/16: C7 niraparib.  78. CT CAP impression as follows:  Target lesions (RECIST criteria):    A previously described target lesion superior to the head of the  pancreas (series 2, image 64)  (referred to as a perihepatic node on  6/3/2016) may not be a valid target lesion because it measured less  than 1.5 cm originally. However, this particular node has decreased in  size, now measuring 7 mm in short axis versus 14 mm on 6/3/2016 when  measured in a similar fashion.    2.3 cm short axis portacaval lymph node on series 2 image 67,  previously 2.0 cm on 10/4/2016.    1.2 cm subtle hypodensity in hepatic segment 6 on series 2 image 75,  stable on multiple studies since at least 6/3/2016  Sum of diameters today: 3.5 cm. Sum of diameters 10/4/2016: 3.2 cm.  Growth = 9%.      12/27/16: C8 niraparib.  105.  1/25/17: C9 niraparib.   108    2/20/17: CT CAP Impression   IMPRESSION:   1. In this patient with history of ovarian cancer there is stable  disease by RECIST criteria as evidenced by:    1a. Mildly increased size of liver metastasis.   1b. Stable portacaval lymphadenopathy.   1c. No evidence of metastatic disease in the chest.  2. Trace emphysematous changes of the lungs.    2/23/17:  pending, C10 niraparib  CT CAP impression:  Sum of target lesion diameters today: 3.7 cm. Sum of target lesion  diameters on 11/28/2016: 3.5 cm. Growth= 6%  1. In this patient with history of ovarian cancer there is stable  disease by RECIST criteria as evidenced by:   1a. Mildly increased size of liver metastasis.  1b. Stable portacaval lymphadenopathy.  1c. No evidence of metastatic disease in the chest.  2. Trace emphysematous changes of the lungs.  3/22/17: C11 niraparib.  132.  4/19/17: C12 niraparib.  127.    5/16/17: CT CAP  IMPRESSION: In this patient with ovarian cancer:  1. Mildly increased size of hepatic metastasis segment 6 with subtle  increased capsular retraction.  2. Stable edmundo hepatis nodes, with mild increase in size of  periaortic lymph nodes.  3. Prominent left supraclavicular lymph node with subtle increase in  size compared to prior studies, particularly comparing to 10/4/2016.  4. Mild subtle groundglass opacities in the right upper lobe, not  present on prior study, lesser extent in the right lower lobe.  Findings may represent infection, additional consideration is  malignancy (less likely), and attention on follow-up study  Recommended.    5/18/17:  pending     Past Medical History:   Diagnosis Date     Antiplatelet or antithrombotic long-term use      Ascites      Blood clot in the legs      Diabetes (H)      Ovarian cancer (H)     serous,stg IV     Pleural effusion      Pulmonary embolism (H) 2/2012     Refusal of blood transfusions as patient is [employer]      Short gut syndrome      Subclinical  hypothyroidism 4/18/2013     Thrombosis of leg        Past Surgical History:   Procedure Laterality Date     COLECTOMY       COLONOSCOPY  2/1/2012    Procedure:COLONOSCOPY; With Biopsy; Surgeon:TONI SULLIVAN; Location:[facility]     HYSTERECTOMY TOTAL ABD, RHIANNA SALPINGO-OOPHORECTOMY, NODE DISSECTION, TUMOR DEBULKING, COMBINED  2/1/2012    Procedure:COMBINED HYSTERECTOMY TOTAL ABDOMINAL, BILATERAL SALPINGO-OOPHORECTOMY, NODE DISSECTION, TUMOR DEBULKING;  Exploratory Laparotomy, Total Abdominal Hysterectomy, Bilateral Salpingo-Oophorectomy, appendectomy,lysis of adhesions, ileal, ascending, transverse and splenic flexure resection, ileal descending bowel renanastomosis, incidental cystotomy repair, CUSA procedure and colonoscopy ; [facility]     INSERT PORT PERITONEAL ACCESS  4/3/2012    Procedure:INSERT PORT PERITONEAL ACCESS; Intraperitoneal Port Placement (c-arm); Surgeon:SAMUEL CARRASCO; Location:[facility]     INSERT PORT PERITONEAL ACCESS  5/14/2014    Procedure: INSERT PORT PERITONEAL ACCESS;  Surgeon: Nga Yeung MD;  Location: [facility] OR     INSERT PORT VASCULAR ACCESS       LAPAROSCOPY DIAGNOSTIC (GYN)  5/14/2014    Procedure: LAPAROSCOPY DIAGNOSTIC (GYN);  Surgeon: Nga Yeung MD;  Location: [facility] OR     LAPAROTOMY EXPLORATORY Right 11/25/2015    Procedure: LAPAROTOMY EXPLORATORY;  Surgeon: Nga Yeung MD;  Location: [facility]     LAPAROTOMY, TUMOR DEBULKING, COMBINED  5/14/2014    Procedure: COMBINED LAPAROTOMY, TUMOR DEBULKING;  Surgeon: Nga Yeung MD;  Location: [facility]     REMOVE CATHETER PERITONEAL N/A 8/20/2014    Procedure: REMOVE CATHETER PERITONEAL;  Surgeon: Nga Yeung MD;  Location: [facility] OR     VASCULAR SURGERY      stent left iliac vein       Current Outpatient Prescriptions   Medication     study - niraparib (IDS# 4759) 100 mg capsule     ferrous sulfate (IRON) 325 (65 FE) MG tablet     study - niraparib (IDS# [id number]) 100 mg capsule     LEVOTHYROXINE SODIUM PO     order for  "DME     LORazepam (ATIVAN) 1 MG tablet     METFORMIN HCL PO     diphenoxylate-atropine (LOMOTIL) 2.5-0.025 MG per tablet     cyanocobalamin (VITAMIN B12) 1000 MCG/ML injection     magnesium oxide (MAG-OX) 400 MG tablet     ASPIRIN PO     potassium chloride (KLOR-CON) 20 MEQ packet     VITAMIN E NATURAL PO     Cholecalciferol (VITAMIN D PO)     HERBALS     Ascorbic Acid (VITAMIN C PO)     Calcium Carbonate-Vitamin D (CALCIUM + D PO)     study - niraparib (IDS# [id number]) 100 mg capsule     Current Facility-Administered Medications   Medication     heparin 100 UNIT/ML injection 5 mL     Facility-Administered Medications Ordered in Other Visits   Medication     heparin 100 UNIT/ML injection 500 Units          Allergies   Allergen Reactions     Nkda [No Known Drug Allergies]        Family History   Problem Relation Age of Onset     CANCER Mother 69     lung, smoker     CANCER Maternal Uncle 65     brain     Colon Cancer Maternal Aunt 80     colon       Social History     Social History     Marital status:      Spouse name: N/A     Number of children: N/A     Years of education: N/A     Occupational History     Not on file.     Social History Main Topics     Smoking status: Former Smoker     Years: 8.00     Types: Cigarettes     Quit date: 6/21/1980     Smokeless tobacco: Never Used      Comment: started at 9 yo and quit at 16 yo     Alcohol use Yes      Comment: 3x/day wine or jodi     Drug use: No     Sexual activity: Not on file     Other Topics Concern     Not on file     Social History Narrative       Physical Exam:    /69  Pulse 100  Temp 98.6  F (37  C) (Oral)  Resp 16  Ht 1.651 m (5' 5\")  Wt 62.8 kg (138 lb 7.2 oz)  SpO2 99%  BMI 23.04 kg/m2  ECOG score-0    General: Alert non-toxic appearing female in no acute distress  HEENT: Normocephalic, atraumatic; PERRLA; no scleral icterus; oropharynx pink without lesions; neck supple 1cm lymph node palpable in the left supraclav/lower cervical " region  Pulmonary: Lungs clear to auscultation, no increased work of breathing noted  Cardiac: Tachycardic, regular rhythm, S1S2, no clicks, murmurs, rubs, or gallops; bilateral lower extremities without edema, dorsalis pedis pulses 2+ bilaterally  GI: Normoactive bowel sounds x4 quadrants, abdomen soft, non-distended, and non-tender to palpation without masses or organomegaly  : Not indicated  Heme: Cervical, supraclavicular, and inguinal nodes without lymphadenopathy  MSK: Moves all extremities, no obvious muscle wasting  Neuro: No gross deficits, normal gait  Skin: Appropriate color for race, warm and dry, no rashes or lesions to unclothed skin  Psych: Pleasant and interactive, affect bright, makes appropriate eye contact, thought process linear    Labs:  CBC RESULTS:   Recent Labs   Lab Test  05/18/17   1305   WBC  7.2   RBC  3.30*   HGB  12.0   HCT  35.6   MCV  108*   MCH  36.4*   MCHC  33.7   RDW  13.1   PLT  229     Recent Labs   Lab Test  05/18/17   1305  04/19/17   1354   NA  138  141   POTASSIUM  3.5  3.2*   CHLORIDE  103  105   CO2  24  26   ANIONGAP  12  10   GLC  109*  117*   BUN  14  18   CR  0.73  1.04   JOSE ALBERTO  8.9  8.7     Liver Function Studies -   Recent Labs   Lab Test  05/18/17   1305   PROTTOTAL  7.3   ALBUMIN  3.7   BILITOTAL  0.5   ALKPHOS  121   AST  21   ALT  23            Date Value Ref Range Status   04/19/2017 127 (H) 0 - 30 U/mL Final     Comment:     Assay Method:  Chemiluminescence using Siemens Centaur XP   03/22/2017 132 (H) 0 - 30 U/mL Final     Comment:     Assay Method:  Chemiluminescence using Siemens Centaur XP   02/23/2017 132 (H) 0 - 30 U/mL Final     Comment:     Assay Method:  Chemiluminescence using Siemens Centaur XP   01/25/2017 108 (H) 0 - 30 U/mL Final     Comment:     Assay Method:  Chemiluminescence using Siemens Centaur XP   12/27/2016 105 (H) 0 - 30 U/mL Final     Comment:     Assay Method:  Chemiluminescence using Siemens Centaur XP   11/28/2016 78 (H) 0 - 30  U/mL Final     Comment:     Assay Method:  Chemiluminescence using Siemens Centaur XP   11/01/2016 75 (H) 0 - 30 U/mL Final     Comment:     Assay Method:  Chemiluminescence using Siemens Centaur XP   10/11/2016 60 (H) 0 - 30 U/mL Final     Comment:     Assay Method:  Chemiluminescence using Siemens Centaur XP   10/06/2016 59 (H) 0 - 30 U/mL Final     Comment:     Assay Method:  Chemiluminescence using Siemens Centaur XP   09/06/2016 53 (H) 0 - 30 U/mL Final     Comment:     Assay Method:  Chemiluminescence using Siemens Centaur XP         Assessment/Plan:  1) Recurrent stage IIIC bilateral ovarian cancer: Patient tolerating without dose limiting side effects. Proceed with cycle 13 niraparib. 5/11/17 CT CAP with RECIST shows stable disease with mildly increased size of liver metastasis and Mild subtle groundglass opacities in the right upper lobe.  = 127 as of 4/19/17. Reviewed signs and symptoms for when she should contact the clinic or seek additional care, including but not limited to fever, chills, inability to keep down food or fluids, nausea and vomiting not controlled with antiemetics, and diarrhea leading to dehydration. Patient to contact the clinic with any questions or concerns in the interim. Betzy Jones RN in for teaching and study follow up.    2) Genetic counseling: Testing has been performed and she is negative for mutations in BRCA1, BRCA2, EPCAM, MLH1, MSH2, MSH6, PMS2, PTEN, and TP53 genes    3) Health maintenance issues discussed include to continue following up with PCP for non-gynecologic concerns.    4) Patient verbalized understanding of and agreement with plan    A total of 30 minutes spent with patient, over 50% spent in counseling and coordination of care.    Patient seen and discussed with Dr. Anjana Cast MD  Heme/Onc Fellow      Nga Yeung MD    Department of Ob/Gyn and Women's Health  Division of Gynecologic Oncology  [facility]  [facility]  [phone]    I saw and evaluated the patient with the fellow. I edited and reviewed the above note.    Patient Care Team:  Aubrie Hester as PCP - Nga Roman MD as MD (Oncology)  Patricia Rocha APRN CNP as Nurse Practitioner (Nurse Practitioner)      Again, thank you for allowing me to participate in the care of your patient.      Sincerely,    Nga Yeung MD

## 2017-06-06 DIAGNOSIS — D64.9 ANEMIA, UNSPECIFIED TYPE: ICD-10-CM

## 2017-06-06 DIAGNOSIS — C56.2 OVARIAN CANCER, LEFT (H): ICD-10-CM

## 2017-06-06 DIAGNOSIS — C56.1 OVARIAN CANCER, RIGHT (H): ICD-10-CM

## 2017-06-06 RX ORDER — FERROUS SULFATE 325(65) MG
325 TABLET ORAL
Qty: 90 TABLET | Refills: 3 | Status: SHIPPED | OUTPATIENT
Start: 2017-06-06 | End: 2017-10-22

## 2017-06-06 NOTE — TELEPHONE ENCOUNTER
[facility]/patient calling for refill of ferrous sulfate for the patient  Last visit with MD 5/18/17  Last order date    ferrous sulfate (IRON) 325 (65 FE) MG tablet (Discontinued) 90 tablet 3 2/9/2017 6/6/2017 No      Sig: Take 1 tablet (325 mg) by mouth 3 times daily (with meals) Take with small amount of orange juice, do not take with calcium     Class: E-Prescribe           Approval per Terrie Porras   rx sent to pharmacy

## 2017-06-14 ASSESSMENT — ENCOUNTER SYMPTOMS
ARTHRALGIAS: 0
DECREASED APPETITE: 0
BREAST PAIN: 0
TINGLING: 0
RESPIRATORY PAIN: 0
HOARSE VOICE: 0
BREAST MASS: 0
HEMATURIA: 0
WEAKNESS: 0
HYPOTENSION: 0
SHORTNESS OF BREATH: 0
FATIGUE: 0
STIFFNESS: 0
POSTURAL DYSPNEA: 0
ABDOMINAL PAIN: 0
NUMBNESS: 0
DISTURBANCES IN COORDINATION: 0
LIGHT-HEADEDNESS: 0
BACK PAIN: 0
DIFFICULTY URINATING: 0
HEADACHES: 0
SEIZURES: 0
HEMOPTYSIS: 0
DYSPNEA ON EXERTION: 0
EXERCISE INTOLERANCE: 0
FLANK PAIN: 0
WHEEZING: 0
CLAUDICATION: 0
SINUS PAIN: 0
HYPERTENSION: 0
DOUBLE VISION: 0
MUSCLE CRAMPS: 0
POOR WOUND HEALING: 0
DECREASED CONCENTRATION: 0
BLOOD IN STOOL: 0
CONSTIPATION: 0
EXTREMITY NUMBNESS: 0
MUSCLE WEAKNESS: 0
DECREASED LIBIDO: 0
RECTAL BLEEDING: 0
VOMITING: 0
EYE PAIN: 0
BRUISES/BLEEDS EASILY: 0
HALLUCINATIONS: 0
HEARTBURN: 0
PALPITATIONS: 0
SORE THROAT: 0
DEPRESSION: 0
RECTAL PAIN: 0
MEMORY LOSS: 0
INSOMNIA: 0
SMELL DISTURBANCE: 0
EYE WATERING: 0
CHILLS: 0
NERVOUS/ANXIOUS: 0
POLYPHAGIA: 0
PARALYSIS: 0
SWOLLEN GLANDS: 0
NAIL CHANGES: 0
LEG PAIN: 0
MYALGIAS: 0
ORTHOPNEA: 0
COUGH DISTURBING SLEEP: 0
PANIC: 0
SYNCOPE: 0
EYE REDNESS: 0
COUGH: 0
TREMORS: 0
NIGHT SWEATS: 0
JOINT SWELLING: 0
SLEEP DISTURBANCES DUE TO BREATHING: 0
FEVER: 0
BLOATING: 0
EYE IRRITATION: 0
TROUBLE SWALLOWING: 0
HOT FLASHES: 0
BOWEL INCONTINENCE: 0
LEG SWELLING: 0
DIZZINESS: 0
ALTERED TEMPERATURE REGULATION: 0
POLYDIPSIA: 0
NECK PAIN: 0
WEIGHT GAIN: 0
INCREASED ENERGY: 0
DYSURIA: 0
TASTE DISTURBANCE: 0
TACHYCARDIA: 0
NAUSEA: 0
SNORES LOUDLY: 0
LOSS OF CONSCIOUSNESS: 0
SPUTUM PRODUCTION: 0
SINUS CONGESTION: 0
NECK MASS: 0
JAUNDICE: 0
DIARRHEA: 1
SKIN CHANGES: 0
DIARRHEA: 0
WEIGHT LOSS: 0
SPEECH CHANGE: 0

## 2017-06-14 NOTE — PROGRESS NOTES
Review of Systems     Constitutional:  Negative for fever, chills, weight loss, weight gain, fatigue, decreased appetite, night sweats, recent stressors, height gain, height loss, post-operative complications, incisional pain, hallucinations, increased energy, hyperactivity and confused.   HENT:  Negative for ear pain, hearing loss, tinnitus, nosebleeds, trouble swallowing, hoarse voice, mouth sores, sore throat, ear discharge, tooth pain, gum tenderness, taste disturbance, smell disturbance, hearing aid, bleeding gums, dry mouth, sinus pain, sinus congestion and neck mass.    Eyes:  Negative for double vision, pain, redness, eye pain, decreased vision, eye watering, eye bulging, eye dryness, flashing lights, spots, floaters, strabismus, tunnel vision, jaundice and eye irritation.   Respiratory:   Negative for cough, hemoptysis, sputum production, shortness of breath, wheezing, sleep disturbances due to breathing, snores loudly, respiratory pain, dyspnea on exertion, cough disturbing sleep and postural dyspnea.    Cardiovascular:  Negative for chest pain, dyspnea on exertion, palpitations, orthopnea, claudication, leg swelling, fingers/toes turn blue, hypertension, hypotension, syncope, history of heart murmur, chest pain on exertion, chest pain at rest, pacemaker, few scattered varicosities, leg pain, sleep disturbances due to breathing, tachycardia, light-headedness, exercise intolerance and edema.   Gastrointestinal:  Negative for heartburn, nausea, vomiting, abdominal pain, diarrhea, constipation, blood in stool, melena, rectal pain, bloating, hemorrhoids, bowel incontinence, jaundice, rectal bleeding, coffee ground emesis and change in stool.   Genitourinary:  Negative for bladder incontinence, dysuria, urgency, hematuria, flank pain, vaginal discharge, difficulty urinating, genital sores, dyspareunia, decreased libido, nocturia, voiding less frequently, arousal difficulty, abnormal vaginal bleeding,  excessive menstruation, menstrual changes, hot flashes, vaginal dryness and postmenopausal bleeding.   Musculoskeletal:  Negative for myalgias, back pain, joint swelling, arthralgias, stiffness, muscle cramps, neck pain, bone pain, muscle weakness and fracture.   Skin:  Negative for nail changes, itching, poor wound healing, rash, hair changes, skin changes, acne, warts, poor wound healing, scarring, flaky skin, Raynaud's phenomenon, sensitivity to sunlight and skin thickening.   Neurological:  Negative for dizziness, tingling, tremors, speech change, seizures, loss of consciousness, weakness, light-headedness, numbness, headaches, disturbances in coordination, extremity numbness, memory loss, difficulty walking and paralysis.   Endo/Heme:  Negative for anemia, swollen glands and bruises/bleeds easily.   Psychiatric/Behavioral:  Negative for depression, hallucinations, memory loss, decreased concentration, mood swings and panic attacks.    Breast:  Negative for breast discharge, breast mass, breast pain and nipple retraction.   Endocrine:  Negative for altered temperature regulation, polyphagia, polydipsia, unwanted hair growth and change in facial hair.    Gynecologic Oncology Follow-Up Note  RE: Odalys Nathan  MRN: [id number]  : 1960  Date of Visit: 06/15/2017    CC: Odalys Nathan is a 56 year old year old female with recurrent stage IIIC bilateral ovarian cancer who presents today for follow up regarding disease management while on the TESARO niraparib trial.    HPI: Odalys returns today in follow up for her recurrent ovarian cancer. She is currently on a clinical trial with niraparib and tolerating it well. She has been on it for nearly a year now. She continues to report diarrhea that is unchanged. She is otherwise feeling well. She is eating and drinking well. No nausea or vomiting. No fever or chills. No abdominal or pelvic pain. No lower extremity pain. No chest pain or SOB. No vaginal  bleeding or abnormal discharge. Will schedule repeat imaging at next visit in 1 month. Patient to continue with Cycle #14 niraparib 200 mg.      Brief Oncology History:  1/18/2012 - Admitted to hospital for 2 weeks of intermittent abdominal cramping, distention, diarrhea and N/V. CT of abdomen/pelvis significant for small bowel obstruction, a heterogenous soft tissue density in the pelvis, omental nodules, and ascites. Bilateral adnexal masses per U/S of pelvis. CA-125 was elevated at 987, CEA was normal at 1.0.    1/18/12 - Therapeutic paracentesis (4 L) with cytology confirming malignancy (MS positive, weak ER positive, CK-7 positive consistent with GYN primary). Surgery recommended d/t potential for falling blood counts 2/2 chemotherapy (patient is Orthodoxy and limiting blood transfusion)    2/1/12 - Exploratory laparotomy, MAR BSO, lysis of adhesion, Appendectomy, Repair cystotomy, Omentectomy Fpqb-zawtax-mgceegwun-transverse-colon resection, Ileo-descending colon anastomosis, CUSA, & Colonoscopy (done by Dr. Amato and colorectal team).  2/20/2012 - Admitted to hospital for bilateral pulmonary emboli and drainage of pleural effusion. Started on Lovenox.  2/28/12-3/21/12: Cycle #1-2 Carbo/Taxol IV  3/29/12 - Started on Keflex by her PCP for infection in her healing wound (immediately below her umbilicus).    4/11/12-6/14/12: Cycle #3-6 IV chemo, Cycle #1 IV/IP chemo  7/16/12 -  8, CT PRADEEP - enrolled in  - observation arm  3/4/13-6/28/13:  6, 9, 12, 15, 20.  Decision made to start Doxil/Carbo.  7/11/13: The left ventricular ejection fraction is normal at 66.4%.  7/12/13-9/6/13: Cycle #1-3 Doxil/Carbo.  10, 11.    2/20/14: Decision to take break from chemo for two months, followed by CT and CA-125.    4/21/14  27  exploratory laparotomy and removal of mesenteric masses, tumor debulking, peritoneal biopsies and intraperitoneal port placement. On laparoscopy, it was noted that  "there were small nodules on the anterior abdominal wall near the previous incision, small nodules on the right pelvic sidewall as well. Nodules were palpated in the mesentery; however, as it was unable to clarify where the origin of the nodules was, the decision was made to open the patient. On opening there was found to be approximately a 3 cm nodule in the small bowel mesentery and another separate approximately 2 cm nodule in the bowel mesentery. Pelvis without evidence of cancer, some mesenteric lymph nodes were palpated. No evidence otherwise of any disseminated cancer throughout the abdomen.    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 (2/2)  -Largest metastasis: 1.3 cm  -See comment  D: Peritoneum, right paracolic gutter #2, biopsy:  -Fibroadipose tissue with granulomatous inflammation surrounding refractile material  -Negative for malignancy   E: Small bowel adhesion, biopsy:  -Fibroconnective tissue, consistent with adhesion  -Negative for malignancy  F: Lymph nodes, mesentry, not otherwise specified, excision:  -Two lymph nodes, negative for metastatic carcinoma (0/2)  G: Lymph node, mesentery, \"#2\", excision:  -One lymph node, negative for metastatic carcinoma (0/1)  H: Lymph nodes, mesentery, \"nodule #2\", excision:  -Five lymph nodes, negative for metastatic carcinoma (0/5)  COMMENT:  Some of the specimens show post-operative changes. Others show possible treatment related changes, including necrosis. The metastatic carcinoma in the mesenteric lymph nodes (specimen C) shows variable morphology, including relatively low grade tumor with papillary " architecture, and high grade tumor comprised of nests of tumor cells with irregular, slit-like spaces and marked nuclear pleomorphism.    5/29/14: Cycle 1 IV PACLitaxel / IP CISplatin / IP PACLitaxel.  - 28.  6/26/14: Cycle #2 IV/IP.  10  8/5/14: CT chest/abd/pelvis IMPRESSION     1. In this patient with ovarian cancer, overall findings are indicative of stable/slight improvement, as multiple mesenteric lymphadenopathy and scattered nodular peritoneal soft tissue mass lesions appear unchanged or slightly smaller since 4/21/2014.    2. 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 Mild dilatation of the second and the third portion of the duodenum with a narrow SMA angle. This could represent SMA syndrome, if clinically correlated.17/14:    Cycle #3 IV/IP.  10.    8/7/14: Cycle #4 Taxol/Carbo (changed from IV/IP).  10. She has been feeling okay. She is unsure if she can finish out the course of 6 cycles IV/IP taxol/cisplatin. She feels like she has the flu for about a week then starts feeling gradually better after each chemo cycle. Her spouse notes that she actually has been more sick with the treatments than she initially admits here. She was also previously having some rib pain. Denies any rib pain now. Denies any chest pain or shortness of breath.  8/20/14: Remove Intraperitoneal Port ( Port and catheter intact - discarded)  8/28/14: Cycle #5 Taxol/Carbo held due to thrombocytopenia.  6.    9/29/14: Cycle #6 Taxol/Carbo  6. Insurance questions regarding GSF coverage today. No concerns other than fatigue. Taking iron for anemia and does not desire blood transfusion. Using neulasta for neutropenia. Using home IV hydration if needed. Baseline unchanged. No abdominal bloating, constipation, diarrhea, pain, vaginal or rectal bleeding, cough or dyspnea, fluid retention.     10/20/14:  5. CT chest/abdomen/pelvis on 10/16/14 showed nodular peritoneal soft tissue  mass in the right lower quadrant adjacent to the psoas muscle is no longer appreciated. Adjacent prominent lymphadenopathy is unchanged from previous exam. 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  1/27/15:  6.  4/28/15:  14.  5/26/15:  18.    6/2/15: CT cap Impression:  1. Postsurgical changes of hysterectomy and bilateral salpingo-oophorectomy for ovarian cancer. There is a new 8 mm hazy, ill-defined hypoattenuating lesion in hepatic segment 6 which is suspicious for a metastatic deposit. Further evaluation with ultrasound in recommended.    2. Increased size of a left retroperitoneal lymph node which is indeterminate but may represent a ciro metastasis. Mildly prominent lymph nodes in the right lower quadrant are not significantly changed.  3. Moderate colonic stool burden.    6/4/15: US abdomen IMPRESSION:    Hyperechoic lesion in the right hepatic lobe, consistent with hemangioma. This does not corresponds to the area of the lesion seen on CT from 6/2/2015. An MR would be helpful for identifying and characterizing the lesion from the recent CT.  6/12/15: MR abd IMPRESSION:  1. New 20 x 11 mm enhancing lesions between the right obliques, concerning for metastatic disease. This lesions should be amenable to percutaneous biopsy, if indicated.  2. Correlating to the lesion visualized on comparison CT is a hepatic segment 6 subcapsular 7 mm lesion. Overall the appearance favors the diagnosis of a simple cyst. However, there is faint suggestion of mild peripheral arterial enhancement. Although this is favored as  artifactual, this should be followed up to confirm stability. Recommend 6 month followup.  3. Hepatic segment 6, 5 mm lesion too small to technically characterize. Differential would favor FNH, less likely flash filling hemangioma. Recommend attention on followup.    9/1/15: Cycle #1 Avastin/Cytoxan.   31.  9/24/15: Cycle #2  Avastin/Cytoxan.  19.  10/15/15: Cycle #3 Avastin/Cytoxan.  16.     11/6/15: CT c/a/p IMPRESSION:    1. Stable postoperative change of MAR/BSO for ovarian cancer.  2. The lesion in the right flank abdominal musculature is slightly decreased in size. Otherwise, stable examination.  2. No evidence of metastatic disease in the chest.    11/20/15:  16    11/25/15: surgical pathology report  FINAL DIAGNOSIS:  Soft tissue, right oblique muscle mass, excision:  -Recurrent ovarian serous carcinoma  -Carcinoma is present less than 1 mm from one resection margin  -Background skeletal muscle and fibroadipose tissue    1/4/16:  22  1/11/16-1/27/16: Radiation to right flank x 12 treatments    4/7/2016:  94. CT cap IMPRESSION:    In this patient with ovarian cancer status post MAR/BSO and descending/transverse colectomy:  1. No evidence for malignancy in the chest, abdomen, or pelvis.  2. Stable small hypodense segment 6 liver lesion, appears more likely benign, possibly a cyst.    5/13/16:  124.    6/3/16: PET CT IMPRESSION: In this patient with a history of ovarian cancer:  1. Hypermetabolic and enlarging periaortic and perihepatic lymphadenopathy compatible with metastatic disease, as detailed above.  2. Although hypodense lesion in hepatic segment 6 has been present since 6/2/2015 associated hypermetabolism makes this lesion highly concerning for metastatic disease.    6/9/16:  137.  6/14/16: Cycle #1 Niraparib.    6/28/16: Cycle #1 D15 Niraparib.    7/5/16:  100.    7/11/16: Cycle #2 Niraparib.   83.    8/3/2016: PET CT IMPRESSION:    In this patient with known history of ovarian cancer:  1) New pleural based nodular opacities in the lateral and inferior aspects of the bilateral lower lobes, worse in the left lung. Likely infection. Close follow up is recommended.      2) Slight decrease in hypermetabolic abdominal lymphadenopathy. 2 hypermetabolic lymph nodes  persist.  3) Unchanged right hepatic lobe metastatic lesion.     8/9/16: Cycle #3 Niraparib.  69.  9/6/16: Cycle #4 Niraparib.  53. Dose held due to anemia.  9/13/16: Eval for potential cycle 4 niraparib. Dose held due to anemia.    10/4/16: CT CAP impression:   In this patient with a known history of ovarian cancer:  1. There has been interval resolution of pleural-based nodular  opacities which likely represented infection.  2. Abdominal lymphadenopathy in the form of 2 portacaval lymph nodes  have not significantly changed in size, noted to be hypermetabolic on  prior PET/CT.  3. Previously demonstrated metastatic lesion in the right lobe of the  liver is not significantly changed.  10/11/16:  60  11/1/16: C6 niraparib,  75  11/29/16: C7 niraparib.  78. CT CAP impression as follows:  Target lesions (RECIST criteria):    A previously described target lesion superior to the head of the  pancreas (series 2, image 64)  (referred to as a perihepatic node on  6/3/2016) may not be a valid target lesion because it measured less  than 1.5 cm originally. However, this particular node has decreased in  size, now measuring 7 mm in short axis versus 14 mm on 6/3/2016 when  measured in a similar fashion.    2.3 cm short axis portacaval lymph node on series 2 image 67,  previously 2.0 cm on 10/4/2016.    1.2 cm subtle hypodensity in hepatic segment 6 on series 2 image 75,  stable on multiple studies since at least 6/3/2016  Sum of diameters today: 3.5 cm. Sum of diameters 10/4/2016: 3.2 cm.  Growth = 9%.      12/27/16: C8 niraparib.  105.  1/25/17: C9 niraparib.  108    2/20/17: CT CAP Impression   IMPRESSION:   1. In this patient with history of ovarian cancer there is stable  disease by RECIST criteria as evidenced by:    1a. Mildly increased size of liver metastasis.   1b. Stable portacaval lymphadenopathy.   1c. No evidence of metastatic disease in the chest.  2. Trace emphysematous  changes of the lungs.    2/23/17:  pending, C10 niraparib  CT CAP impression:  Sum of target lesion diameters today: 3.7 cm. Sum of target lesion  diameters on 11/28/2016: 3.5 cm. Growth= 6%  1. In this patient with history of ovarian cancer there is stable  disease by RECIST criteria as evidenced by:   1a. Mildly increased size of liver metastasis.  1b. Stable portacaval lymphadenopathy.  1c. No evidence of metastatic disease in the chest.  2. Trace emphysematous changes of the lungs.  3/22/17: C11 niraparib.  132.  4/19/17: C12 niraparib.  127.    5/16/17: CT CAP  IMPRESSION: In this patient with ovarian cancer:  1. Mildly increased size of hepatic metastasis segment 6 with subtle  increased capsular retraction.  2. Stable edmundo hepatis nodes, with mild increase in size of  periaortic lymph nodes.  3. Prominent left supraclavicular lymph node with subtle increase in  size compared to prior studies, particularly comparing to 10/4/2016.  4. Mild subtle groundglass opacities in the right upper lobe, not  present on prior study, lesser extent in the right lower lobe.  Findings may represent infection, additional consideration is  malignancy (less likely), and attention on follow-up study  Recommended.  Addendum:   Prominent left supraclavicular lymph node (3/25) is stable from most  recent CT performed 2/20/2017, currently measuring 14 x 16 mm,  previously 14 x 16 mm on 2/20/2017.     The portal caval lymph node/edmundo hepatis lymph node is stable from  2/20/2017, measuring 21 mm.     Clarification of size of the para-aortic lymph node (series 3 image  327). It short axis measurement is 11 mm versus 9 mm on prior study.     Hepatic segment 6 triangular-shaped low density lesion (3/362)  measures 14 mm, previously measured 12 mm, demonstrating a  possible/questionable minimal subtle increase in size. Similarly the  lymph nodes noted above in the supraclavicular and para-aortic regions  demonstrate  possible minimal possible subtle increase in size.     5/18/17: C13 niraparib.  191.  6/16/17: C14 niraparib.  pending     Past Medical History:   Diagnosis Date     Antiplatelet or antithrombotic long-term use      Ascites      Blood clot in the legs      Diabetes (H)      Ovarian cancer (H)     serous,stg IV     Pleural effusion      Pulmonary embolism (H) 2/2012     Refusal of blood transfusions as patient is Sabianism      Short gut syndrome      Subclinical hypothyroidism 4/18/2013     Thrombosis of leg        Past Surgical History:   Procedure Laterality Date     COLECTOMY       COLONOSCOPY  2/1/2012    Procedure:COLONOSCOPY; With Biopsy; Surgeon:TONI SULLIVAN; Location:[facility]     HYSTERECTOMY TOTAL ABD, RHIANNA SALPINGO-OOPHORECTOMY, NODE DISSECTION, TUMOR DEBULKING, COMBINED  2/1/2012    Procedure:COMBINED HYSTERECTOMY TOTAL ABDOMINAL, BILATERAL SALPINGO-OOPHORECTOMY, NODE DISSECTION, TUMOR DEBULKING;  Exploratory Laparotomy, Total Abdominal Hysterectomy, Bilateral Salpingo-Oophorectomy, appendectomy,lysis of adhesions, ileal, ascending, transverse and splenic flexure resection, ileal descending bowel renanastomosis, incidental cystotomy repair, CUSA procedure and colonoscopy ; [facility]     INSERT PORT PERITONEAL ACCESS  4/3/2012    Procedure:INSERT PORT PERITONEAL ACCESS; Intraperitoneal Port Placement (c-arm); Surgeon:SAMUEL CARRASCO; Location:[facility]     INSERT PORT PERITONEAL ACCESS  5/14/2014    Procedure: INSERT PORT PERITONEAL ACCESS;  Surgeon: Nga Yeung MD;  Location: [facility] OR     INSERT PORT VASCULAR ACCESS       LAPAROSCOPY DIAGNOSTIC (GYN)  5/14/2014    Procedure: LAPAROSCOPY DIAGNOSTIC (GYN);  Surgeon: Nga Yeung MD;  Location: [facility]     LAPAROTOMY EXPLORATORY Right 11/25/2015    Procedure: LAPAROTOMY EXPLORATORY;  Surgeon: Nga Yeung MD;  Location: [facility]     LAPAROTOMY, TUMOR DEBULKING, COMBINED  5/14/2014    Procedure: COMBINED LAPAROTOMY, TUMOR  DEBULKING;  Surgeon: Nga Yeung MD;  Location: [facility]     REMOVE CATHETER PERITONEAL N/A 8/20/2014    Procedure: REMOVE CATHETER PERITONEAL;  Surgeon: Nga Yeung MD;  Location: [facility]     VASCULAR SURGERY      stent left iliac vein       Current Outpatient Prescriptions   Medication     ferrous sulfate (IRON) 325 (65 FE) MG tablet     study - niraparib (IDS# 4759) 100 mg capsule     study - niraparib (IDS# 4759) 100 mg capsule     study - niraparib (IDS# 4759) 100 mg capsule     LEVOTHYROXINE SODIUM PO     order for DME     LORazepam (ATIVAN) 1 MG tablet     METFORMIN HCL PO     diphenoxylate-atropine (LOMOTIL) 2.5-0.025 MG per tablet     cyanocobalamin (VITAMIN B12) 1000 MCG/ML injection     magnesium oxide (MAG-OX) 400 MG tablet     ASPIRIN PO     potassium chloride (KLOR-CON) 20 MEQ packet     VITAMIN E NATURAL PO     Cholecalciferol (VITAMIN D PO)     HERBALS     Ascorbic Acid (VITAMIN C PO)     Calcium Carbonate-Vitamin D (CALCIUM + D PO)     No current facility-administered medications for this visit.      Facility-Administered Medications Ordered in Other Visits   Medication     heparin 100 UNIT/ML injection 5 mL     heparin 100 UNIT/ML injection 500 Units          Allergies   Allergen Reactions     Nkda [No Known Drug Allergies]        Family History   Problem Relation Age of Onset     CANCER Mother 69     lung, smoker     CANCER Maternal Uncle 65     brain     Colon Cancer Maternal Aunt 80     colon       Social History     Social History     Marital status:      Spouse name: N/A     Number of children: N/A     Years of education: N/A     Occupational History     Not on file.     Social History Main Topics     Smoking status: Former Smoker     Years: 8.00     Types: Cigarettes     Quit date: 6/21/1980     Smokeless tobacco: Never Used      Comment: started at 11 yo and quit at 16 yo     Alcohol use Yes      Comment: 3x/day wine or jodi     Drug use: No     Sexual activity: Not on  file     Other Topics Concern     Not on file     Social History Narrative       Physical Exam:    /83 (BP Location: Right arm, Patient Position: Chair, Cuff Size: Adult Regular)  Pulse 108  Temp 98  F (36.7  C) (Oral)  Resp 16  Wt 62.4 kg (137 lb 8 oz)  SpO2 99%  BMI 22.88 kg/m2  ECOG score-0    General: Alert non-toxic appearing female in no acute distress  HEENT: Normocephalic, atraumatic; PERRLA; no scleral icterus; oropharynx pink without lesions; neck supple 1cm lymph node palpable in the left supraclav/lower cervical region  Pulmonary: Lungs clear to auscultation, no increased work of breathing noted  Cardiac: Tachycardic, regular rhythm, S1S2, no clicks, murmurs, rubs, or gallops; bilateral lower extremities without edema, dorsalis pedis pulses 2+ bilaterally  GI: Normoactive bowel sounds x4 quadrants, abdomen soft, non-distended, and non-tender to palpation without masses or organomegaly  : Not indicated  Heme: Cervical, supraclavicular, and inguinal nodes without lymphadenopathy  MSK: Moves all extremities, no obvious muscle wasting  Neuro: No gross deficits, normal gait  Skin: Appropriate color for race, warm and dry, no rashes or lesions to unclothed skin  Psych: Pleasant and interactive, affect bright, makes appropriate eye contact, thought process linear    Labs:  Lab Results   Component Value Date    WBC 5.8 06/15/2017     Lab Results   Component Value Date    RBC 3.45 06/15/2017     Lab Results   Component Value Date    HGB 12.3 06/15/2017     Lab Results   Component Value Date    HCT 36.1 06/15/2017     Lab Results   Component Value Date     06/15/2017     Lab Results   Component Value Date    MCH 35.7 06/15/2017     Lab Results   Component Value Date    MCHC 34.1 06/15/2017     Lab Results   Component Value Date    RDW 12.7 06/15/2017     Lab Results   Component Value Date     06/15/2017     Last Basic Metabolic Panel:  Lab Results   Component Value Date      06/15/2017      Lab Results   Component Value Date    POTASSIUM 3.5 06/15/2017     Lab Results   Component Value Date    CHLORIDE 104 06/15/2017     Lab Results   Component Value Date    JOSE ALBERTO 8.7 06/15/2017     Lab Results   Component Value Date    CO2 23 06/15/2017     Lab Results   Component Value Date    BUN 18 06/15/2017     Lab Results   Component Value Date    CR 0.91 06/15/2017     Lab Results   Component Value Date     06/15/2017                Date Value Ref Range Status   06/15/2017 146 (H) 0 - 30 U/mL Final     Comment:     Assay Method:  Chemiluminescence using Siemens Centaur XP   05/18/2017 191 (H) 0 - 30 U/mL Final     Comment:     Assay Method:  Chemiluminescence using Siemens Centaur XP   04/19/2017 127 (H) 0 - 30 U/mL Final     Comment:     Assay Method:  Chemiluminescence using Siemens Centaur XP   03/22/2017 132 (H) 0 - 30 U/mL Final     Comment:     Assay Method:  Chemiluminescence using Siemens Centaur XP   02/23/2017 132 (H) 0 - 30 U/mL Final     Comment:     Assay Method:  Chemiluminescence using Siemens Centaur XP   01/25/2017 108 (H) 0 - 30 U/mL Final     Comment:     Assay Method:  Chemiluminescence using Siemens Centaur XP   12/27/2016 105 (H) 0 - 30 U/mL Final     Comment:     Assay Method:  Chemiluminescence using Siemens Centaur XP   11/28/2016 78 (H) 0 - 30 U/mL Final     Comment:     Assay Method:  Chemiluminescence using Siemens Centaur XP   11/01/2016 75 (H) 0 - 30 U/mL Final     Comment:     Assay Method:  Chemiluminescence using Siemens Centaur XP   10/11/2016 60 (H) 0 - 30 U/mL Final     Comment:     Assay Method:  Chemiluminescence using Siemens Centaur XP         Assessment/Plan:  1) Recurrent stage IIIC bilateral ovarian cancer: Patient tolerating without dose limiting side effects. Proceed with cycle 1 niraparib. 5/11/17 CT CAP with RECIST shows stable disease with mildly increased size of liver metastasis and Mild subtle groundglass opacities in the right upper  lobe.  = 191 as of 5/18/17,  pending from visit today. Patient will RTC in 1 month. Reviewed signs and symptoms for when she should contact the clinic or seek additional care, including but not limited to fever, chills, inability to keep down food or fluids, nausea and vomiting not controlled with antiemetics, and diarrhea leading to dehydration. Patient to contact the clinic with any questions or concerns in the interim. Betzy Jones RN in for teaching and study follow up.    2) Genetic counseling: Testing has been performed and she is negative for mutations in BRCA1, BRCA2, EPCAM, MLH1, MSH2, MSH6, PMS2, PTEN, and TP53 genes    3) Labs/Tests ordered: Will order scan at next visit and schedule for August.  pending from today, will repeat at next visit.     4) Health maintenance issues discussed include to continue following up with PCP for non-gynecologic concerns.    5) Patient verbalized understanding of and agreement with plan    A total of 20 minutes spent with patient, over 50% spent in counseling and coordination of care.      Nga Yeung MD    Department of Ob/Gyn and Women's Health  Division of Gynecologic Oncology  [facility]  [phone]    Patient Care Team:  Aubrie Hester as PCP - General  Nga Yeung MD as MD (Oncology)  Patricia Rocha APRN CNP as Nurse Practitioner (Nurse Practitioner)  Breonna Alvarado RN as Registered Nurse      I have reviewed the above note and agree with the scribe's notation as written.    I, Jarek Young, am serving as a scribe to document services personally performed by Nga Yeung MD, based upon my observations and the provider's statements to me. All documentation has been reviewed by the aforementioned doctor prior to being entered into the official medical record.

## 2017-06-15 ENCOUNTER — APPOINTMENT (OUTPATIENT)
Dept: LAB | Facility: CLINIC | Age: 57
End: 2017-06-15
Attending: OBSTETRICS & GYNECOLOGY
Payer: COMMERCIAL

## 2017-06-15 ENCOUNTER — RESEARCH ENCOUNTER (OUTPATIENT)
Dept: ONCOLOGY | Facility: CLINIC | Age: 57
End: 2017-06-15

## 2017-06-15 ENCOUNTER — ONCOLOGY VISIT (OUTPATIENT)
Dept: ONCOLOGY | Facility: CLINIC | Age: 57
End: 2017-06-15
Attending: OBSTETRICS & GYNECOLOGY
Payer: COMMERCIAL

## 2017-06-15 VITALS
HEART RATE: 108 BPM | RESPIRATION RATE: 16 BRPM | SYSTOLIC BLOOD PRESSURE: 146 MMHG | WEIGHT: 137.5 LBS | BODY MASS INDEX: 22.88 KG/M2 | OXYGEN SATURATION: 99 % | DIASTOLIC BLOOD PRESSURE: 83 MMHG | TEMPERATURE: 98 F

## 2017-06-15 DIAGNOSIS — D70.2 DRUG-INDUCED NEUTROPENIA (H): ICD-10-CM

## 2017-06-15 DIAGNOSIS — C56.2 OVARIAN CANCER, LEFT (H): ICD-10-CM

## 2017-06-15 DIAGNOSIS — C56.1 OVARIAN CANCER, RIGHT (H): Primary | ICD-10-CM

## 2017-06-15 LAB
ALBUMIN SERPL-MCNC: 3.7 G/DL (ref 3.4–5)
ALBUMIN UR-MCNC: NEGATIVE MG/DL
ALP SERPL-CCNC: 119 U/L (ref 40–150)
ALT SERPL W P-5'-P-CCNC: 24 U/L (ref 0–50)
AMYLASE SERPL-CCNC: 27 U/L (ref 30–110)
ANION GAP SERPL CALCULATED.3IONS-SCNC: 11 MMOL/L (ref 3–14)
APPEARANCE UR: CLEAR
AST SERPL W P-5'-P-CCNC: 22 U/L (ref 0–45)
BACTERIA #/AREA URNS HPF: ABNORMAL /HPF
BASOPHILS # BLD AUTO: 0 10E9/L (ref 0–0.2)
BASOPHILS NFR BLD AUTO: 0.5 %
BILIRUB SERPL-MCNC: 0.5 MG/DL (ref 0.2–1.3)
BILIRUB UR QL STRIP: NEGATIVE
BUN SERPL-MCNC: 18 MG/DL (ref 7–30)
CALCIUM SERPL-MCNC: 8.7 MG/DL (ref 8.5–10.1)
CANCER AG125 SERPL-ACNC: 146 U/ML (ref 0–30)
CHLORIDE SERPL-SCNC: 104 MMOL/L (ref 94–109)
CO2 SERPL-SCNC: 23 MMOL/L (ref 20–32)
COLOR UR AUTO: YELLOW
CREAT SERPL-MCNC: 0.91 MG/DL (ref 0.52–1.04)
DIFFERENTIAL METHOD BLD: ABNORMAL
EOSINOPHIL # BLD AUTO: 0.1 10E9/L (ref 0–0.7)
EOSINOPHIL NFR BLD AUTO: 2.1 %
ERYTHROCYTE [DISTWIDTH] IN BLOOD BY AUTOMATED COUNT: 12.7 % (ref 10–15)
GFR SERPL CREATININE-BSD FRML MDRD: 63 ML/MIN/1.7M2
GGT SERPL-CCNC: 27 U/L (ref 0–40)
GLUCOSE SERPL-MCNC: 102 MG/DL (ref 70–99)
GLUCOSE UR STRIP-MCNC: NEGATIVE MG/DL
HCT VFR BLD AUTO: 36.1 % (ref 35–47)
HGB BLD-MCNC: 12.3 G/DL (ref 11.7–15.7)
HGB UR QL STRIP: NEGATIVE
IMM GRANULOCYTES # BLD: 0 10E9/L (ref 0–0.4)
IMM GRANULOCYTES NFR BLD: 0.3 %
KETONES UR STRIP-MCNC: NEGATIVE MG/DL
LDH SERPL L TO P-CCNC: 140 U/L (ref 81–234)
LEUKOCYTE ESTERASE UR QL STRIP: NEGATIVE
LYMPHOCYTES # BLD AUTO: 2.3 10E9/L (ref 0.8–5.3)
LYMPHOCYTES NFR BLD AUTO: 38.7 %
MAGNESIUM SERPL-MCNC: 1.6 MG/DL (ref 1.6–2.3)
MCH RBC QN AUTO: 35.7 PG (ref 26.5–33)
MCHC RBC AUTO-ENTMCNC: 34.1 G/DL (ref 31.5–36.5)
MCV RBC AUTO: 105 FL (ref 78–100)
MONOCYTES # BLD AUTO: 0.6 10E9/L (ref 0–1.3)
MONOCYTES NFR BLD AUTO: 10.3 %
MUCOUS THREADS #/AREA URNS LPF: PRESENT /LPF
NEUTROPHILS # BLD AUTO: 2.8 10E9/L (ref 1.6–8.3)
NEUTROPHILS NFR BLD AUTO: 48.1 %
NITRATE UR QL: NEGATIVE
NRBC # BLD AUTO: 0 10*3/UL
NRBC BLD AUTO-RTO: 0 /100
PH UR STRIP: 5 PH (ref 5–7)
PLATELET # BLD AUTO: 245 10E9/L (ref 150–450)
POTASSIUM SERPL-SCNC: 3.5 MMOL/L (ref 3.4–5.3)
PROT SERPL-MCNC: 7.1 G/DL (ref 6.8–8.8)
RBC # BLD AUTO: 3.45 10E12/L (ref 3.8–5.2)
RBC #/AREA URNS AUTO: 0 /HPF (ref 0–2)
SODIUM SERPL-SCNC: 138 MMOL/L (ref 133–144)
SP GR UR STRIP: 1.01 (ref 1–1.03)
URN SPEC COLLECT METH UR: ABNORMAL
UROBILINOGEN UR STRIP-MCNC: 0 MG/DL (ref 0–2)
WBC # BLD AUTO: 5.8 10E9/L (ref 4–11)
WBC #/AREA URNS AUTO: <1 /HPF (ref 0–2)

## 2017-06-15 PROCEDURE — 25000128 H RX IP 250 OP 636: Mod: ZF | Performed by: OBSTETRICS & GYNECOLOGY

## 2017-06-15 PROCEDURE — 82977 ASSAY OF GGT: CPT | Performed by: OBSTETRICS & GYNECOLOGY

## 2017-06-15 PROCEDURE — 83735 ASSAY OF MAGNESIUM: CPT | Performed by: OBSTETRICS & GYNECOLOGY

## 2017-06-15 PROCEDURE — 86304 IMMUNOASSAY TUMOR CA 125: CPT | Performed by: OBSTETRICS & GYNECOLOGY

## 2017-06-15 PROCEDURE — 80053 COMPREHEN METABOLIC PANEL: CPT | Performed by: OBSTETRICS & GYNECOLOGY

## 2017-06-15 PROCEDURE — 99214 OFFICE O/P EST MOD 30 MIN: CPT | Mod: ZP | Performed by: OBSTETRICS & GYNECOLOGY

## 2017-06-15 PROCEDURE — 82150 ASSAY OF AMYLASE: CPT | Performed by: OBSTETRICS & GYNECOLOGY

## 2017-06-15 PROCEDURE — 36415 COLL VENOUS BLD VENIPUNCTURE: CPT | Performed by: OBSTETRICS & GYNECOLOGY

## 2017-06-15 PROCEDURE — 85025 COMPLETE CBC W/AUTO DIFF WBC: CPT | Performed by: OBSTETRICS & GYNECOLOGY

## 2017-06-15 PROCEDURE — 81001 URINALYSIS AUTO W/SCOPE: CPT | Performed by: OBSTETRICS & GYNECOLOGY

## 2017-06-15 PROCEDURE — 83615 LACTATE (LD) (LDH) ENZYME: CPT | Performed by: OBSTETRICS & GYNECOLOGY

## 2017-06-15 PROCEDURE — 36591 DRAW BLOOD OFF VENOUS DEVICE: CPT

## 2017-06-15 RX ORDER — HEPARIN SODIUM (PORCINE) LOCK FLUSH IV SOLN 100 UNIT/ML 100 UNIT/ML
5 SOLUTION INTRAVENOUS ONCE
Status: COMPLETED | OUTPATIENT
Start: 2017-06-15 | End: 2017-06-15

## 2017-06-15 RX ADMIN — SODIUM CHLORIDE, PRESERVATIVE FREE 5 ML: 5 INJECTION INTRAVENOUS at 13:43

## 2017-06-15 ASSESSMENT — PAIN SCALES - GENERAL: PAINLEVEL: NO PAIN (0)

## 2017-06-15 NOTE — NURSING NOTE
Patient here for C14D1 visit for the Tesaro/Quadra Niraparib study.  Patient denies any new issues or concerns at this time.  Patient returned 37 tabs of Niraparib from bottle number [id number] along with pill diary.  Pills taken on pill diary match pills returned.  Labs reviewed and ok to continue current dose of study medication.  Patient was dispensed bottle number [id number] along with new pill diary.  Patient's next appointments made per protocol.  Patient instructed to call with any questions or concerns.  Patient voiced understanding.      Prudence Jones RN     Pager [phone]

## 2017-06-15 NOTE — LETTER
6/15/2017       RE: Odalys Nathan  [id number]  [facility] [id number]     Dear Colleague,    Thank you for referring your patient, Odalys Nathan, to the [facility]. Please see a copy of my visit note below.      Review of Systems     Constitutional:  Negative for fever, chills, weight loss, weight gain, fatigue, decreased appetite, night sweats, recent stressors, height gain, height loss, post-operative complications, incisional pain, hallucinations, increased energy, hyperactivity and confused.   HENT:  Negative for ear pain, hearing loss, tinnitus, nosebleeds, trouble swallowing, hoarse voice, mouth sores, sore throat, ear discharge, tooth pain, gum tenderness, taste disturbance, smell disturbance, hearing aid, bleeding gums, dry mouth, sinus pain, sinus congestion and neck mass.    Eyes:  Negative for double vision, pain, redness, eye pain, decreased vision, eye watering, eye bulging, eye dryness, flashing lights, spots, floaters, strabismus, tunnel vision, jaundice and eye irritation.   Respiratory:   Negative for cough, hemoptysis, sputum production, shortness of breath, wheezing, sleep disturbances due to breathing, snores loudly, respiratory pain, dyspnea on exertion, cough disturbing sleep and postural dyspnea.    Cardiovascular:  Negative for chest pain, dyspnea on exertion, palpitations, orthopnea, claudication, leg swelling, fingers/toes turn blue, hypertension, hypotension, syncope, history of heart murmur, chest pain on exertion, chest pain at rest, pacemaker, few scattered varicosities, leg pain, sleep disturbances due to breathing, tachycardia, light-headedness, exercise intolerance and edema.   Gastrointestinal:  Negative for heartburn, nausea, vomiting, abdominal pain, diarrhea, constipation, blood in stool, melena, rectal pain, bloating, hemorrhoids, bowel incontinence, jaundice, rectal bleeding, coffee ground emesis and change in stool.   Genitourinary:   Negative for bladder incontinence, dysuria, urgency, hematuria, flank pain, vaginal discharge, difficulty urinating, genital sores, dyspareunia, decreased libido, nocturia, voiding less frequently, arousal difficulty, abnormal vaginal bleeding, excessive menstruation, menstrual changes, hot flashes, vaginal dryness and postmenopausal bleeding.   Musculoskeletal:  Negative for myalgias, back pain, joint swelling, arthralgias, stiffness, muscle cramps, neck pain, bone pain, muscle weakness and fracture.   Skin:  Negative for nail changes, itching, poor wound healing, rash, hair changes, skin changes, acne, warts, poor wound healing, scarring, flaky skin, Raynaud's phenomenon, sensitivity to sunlight and skin thickening.   Neurological:  Negative for dizziness, tingling, tremors, speech change, seizures, loss of consciousness, weakness, light-headedness, numbness, headaches, disturbances in coordination, extremity numbness, memory loss, difficulty walking and paralysis.   Endo/Heme:  Negative for anemia, swollen glands and bruises/bleeds easily.   Psychiatric/Behavioral:  Negative for depression, hallucinations, memory loss, decreased concentration, mood swings and panic attacks.    Breast:  Negative for breast discharge, breast mass, breast pain and nipple retraction.   Endocrine:  Negative for altered temperature regulation, polyphagia, polydipsia, unwanted hair growth and change in facial hair.    Gynecologic Oncology Follow-Up Note  RE: Odalys Nathan  MRN: [id number]  : 1960  Date of Visit: 06/15/2017    CC: Odalys Nathan is a 56 year old year old female with recurrent stage IIIC bilateral ovarian cancer who presents today for follow up regarding disease management while on the TESARO niraparib trial.    HPI: Odalys returns today in follow up for her recurrent ovarian cancer. She is currently on a clinical trial with niraparib and tolerating it well. She has been on it for nearly a year now. She  continues to report diarrhea that is unchanged. She is otherwise feeling well. She is eating and drinking well. No nausea or vomiting. No fever or chills. No abdominal or pelvic pain. No lower extremity pain. No chest pain or SOB. No vaginal bleeding or abnormal discharge. Will schedule repeat imaging at next visit in 1 month. Patient to continue with Cycle #14 niraparib 200 mg.      Brief Oncology History:  1/18/2012 - Admitted to hospital for 2 weeks of intermittent abdominal cramping, distention, diarrhea and N/V. CT of abdomen/pelvis significant for small bowel obstruction, a heterogenous soft tissue density in the pelvis, omental nodules, and ascites. Bilateral adnexal masses per U/S of pelvis. CA-125 was elevated at 987, CEA was normal at 1.0.    1/18/12 - Therapeutic paracentesis (4 L) with cytology confirming malignancy (WV positive, weak ER positive, CK-7 positive consistent with GYN primary). Surgery recommended d/t potential for falling blood counts 2/2 chemotherapy (patient is [employer] and limiting blood transfusion)    2/1/12 - Exploratory laparotomy, MAR BSO, lysis of adhesion, Appendectomy, Repair cystotomy, Omentectomy Zhwv-cwcnov-rhhmywotk-transverse-colon resection, Ileo-descending colon anastomosis, CUSA, & Colonoscopy (done by Dr. Amato and colorectal team).  2/20/2012 - Admitted to hospital for bilateral pulmonary emboli and drainage of pleural effusion. Started on Lovenox.  2/28/12-3/21/12: Cycle #1-2 Carbo/Taxol IV  3/29/12 - Started on Keflex by her PCP for infection in her healing wound (immediately below her umbilicus).    4/11/12-6/14/12: Cycle #3-6 IV chemo, Cycle #1 IV/IP chemo  7/16/12 -  8, CT PRADEEP - enrolled in  - observation arm  3/4/13-6/28/13:  6, 9, 12, 15, 20.  Decision made to start Doxil/Carbo.  7/11/13: The left ventricular ejection fraction is normal at 66.4%.  7/12/13-9/6/13: Cycle #1-3 Doxil/Carbo.  10, 11.    2/20/14: Decision to take break  "from chemo for two months, followed by CT and CA-125.    4/21/14  27  exploratory laparotomy and removal of mesenteric masses, tumor debulking, peritoneal biopsies and intraperitoneal port placement. On laparoscopy, it was noted that there were small nodules on the anterior abdominal wall near the previous incision, small nodules on the right pelvic sidewall as well. Nodules were palpated in the mesentery; however, as it was unable to clarify where the origin of the nodules was, the decision was made to open the patient. On opening there was found to be approximately a 3 cm nodule in the small bowel mesentery and another separate approximately 2 cm nodule in the bowel mesentery. Pelvis without evidence of cancer, some mesenteric lymph nodes were palpated. No evidence otherwise of any disseminated cancer throughout the abdomen.    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 (2/2)  -Largest metastasis: 1.3 cm  -See comment  D: Peritoneum, right paracolic gutter #2, biopsy:  -Fibroadipose tissue with granulomatous inflammation surrounding refractile material  -Negative for malignancy   E: Small bowel adhesion, biopsy:  -Fibroconnective tissue, consistent with adhesion  -Negative for malignancy  F: Lymph nodes, mesentry, not otherwise specified, excision:  -Two lymph nodes, negative for metastatic carcinoma (0/2)  G: Lymph node, mesentery, \"#2\", excision:  -One lymph node, negative for metastatic carcinoma (0/1)  H: Lymph nodes, mesentery, \"nodule #2\", excision:  -Five lymph nodes, negative for metastatic carcinoma (0/5)  COMMENT:  Some of the specimens show " post-operative changes. Others show possible treatment related changes, including necrosis. The metastatic carcinoma in the mesenteric lymph nodes (specimen C) shows variable morphology, including relatively low grade tumor with papillary architecture, and high grade tumor comprised of nests of tumor cells with irregular, slit-like spaces and marked nuclear pleomorphism.    5/29/14: Cycle 1 IV PACLitaxel / IP CISplatin / IP PACLitaxel.  - 28.  6/26/14: Cycle #2 IV/IP.  10  8/5/14: CT chest/abd/pelvis IMPRESSION     1. In this patient with ovarian cancer, overall findings are indicative of stable/slight improvement, as multiple mesenteric lymphadenopathy and scattered nodular peritoneal soft tissue mass lesions appear unchanged or slightly smaller since 4/21/2014.    2. 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 Mild dilatation of the second and the third portion of the duodenum with a narrow SMA angle. This could represent SMA syndrome, if clinically correlated.17/14:    Cycle #3 IV/IP.  10.    8/7/14: Cycle #4 Taxol/Carbo (changed from IV/IP).  10. She has been feeling okay. She is unsure if she can finish out the course of 6 cycles IV/IP taxol/cisplatin. She feels like she has the flu for about a week then starts feeling gradually better after each chemo cycle. Her spouse notes that she actually has been more sick with the treatments than she initially admits here. She was also previously having some rib pain. Denies any rib pain now. Denies any chest pain or shortness of breath.  8/20/14: Remove Intraperitoneal Port ( Port and catheter intact - discarded)  8/28/14: Cycle #5 Taxol/Carbo held due to thrombocytopenia.  6.    9/29/14: Cycle #6 Taxol/Carbo  6. Insurance questions regarding GSF coverage today. No concerns other than fatigue. Taking iron for anemia and does not desire blood transfusion. Using neulasta for neutropenia. Using home IV hydration if  needed. Baseline unchanged. No abdominal bloating, constipation, diarrhea, pain, vaginal or rectal bleeding, cough or dyspnea, fluid retention.     10/20/14:  5. CT chest/abdomen/pelvis on 10/16/14 showed nodular peritoneal soft tissue mass in the right lower quadrant adjacent to the psoas muscle is no longer appreciated. Adjacent prominent lymphadenopathy is unchanged from previous exam. 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  1/27/15:  6.  4/28/15:  14.  5/26/15:  18.    6/2/15: CT cap Impression:  1. Postsurgical changes of hysterectomy and bilateral salpingo-oophorectomy for ovarian cancer. There is a new 8 mm hazy, ill-defined hypoattenuating lesion in hepatic segment 6 which is suspicious for a metastatic deposit. Further evaluation with ultrasound in recommended.    2. Increased size of a left retroperitoneal lymph node which is indeterminate but may represent a ciro metastasis. Mildly prominent lymph nodes in the right lower quadrant are not significantly changed.  3. Moderate colonic stool burden.    6/4/15: US abdomen IMPRESSION:    Hyperechoic lesion in the right hepatic lobe, consistent with hemangioma. This does not corresponds to the area of the lesion seen on CT from 6/2/2015. An MR would be helpful for identifying and characterizing the lesion from the recent CT.  6/12/15: MR abd IMPRESSION:  1. New 20 x 11 mm enhancing lesions between the right obliques, concerning for metastatic disease. This lesions should be amenable to percutaneous biopsy, if indicated.  2. Correlating to the lesion visualized on comparison CT is a hepatic segment 6 subcapsular 7 mm lesion. Overall the appearance favors the diagnosis of a simple cyst. However, there is faint suggestion of mild peripheral arterial enhancement. Although this is favored as  artifactual, this should be followed up to confirm stability. Recommend 6 month followup.  3. Hepatic  segment 6, 5 mm lesion too small to technically characterize. Differential would favor FNH, less likely flash filling hemangioma. Recommend attention on followup.    9/1/15: Cycle #1 Avastin/Cytoxan.   31.  9/24/15: Cycle #2 Avastin/Cytoxan.  19.  10/15/15: Cycle #3 Avastin/Cytoxan.  16.     11/6/15: CT c/a/p IMPRESSION:    1. Stable postoperative change of MAR/BSO for ovarian cancer.  2. The lesion in the right flank abdominal musculature is slightly decreased in size. Otherwise, stable examination.  2. No evidence of metastatic disease in the chest.    11/20/15:  16    11/25/15: surgical pathology report  FINAL DIAGNOSIS:  Soft tissue, right oblique muscle mass, excision:  -Recurrent ovarian serous carcinoma  -Carcinoma is present less than 1 mm from one resection margin  -Background skeletal muscle and fibroadipose tissue    1/4/16:  22  1/11/16-1/27/16: Radiation to right flank x 12 treatments    4/7/2016:  94. CT cap IMPRESSION:    In this patient with ovarian cancer status post MAR/BSO and descending/transverse colectomy:  1. No evidence for malignancy in the chest, abdomen, or pelvis.  2. Stable small hypodense segment 6 liver lesion, appears more likely benign, possibly a cyst.    5/13/16:  124.    6/3/16: PET CT IMPRESSION: In this patient with a history of ovarian cancer:  1. Hypermetabolic and enlarging periaortic and perihepatic lymphadenopathy compatible with metastatic disease, as detailed above.  2. Although hypodense lesion in hepatic segment 6 has been present since 6/2/2015 associated hypermetabolism makes this lesion highly concerning for metastatic disease.    6/9/16:  137.  6/14/16: Cycle #1 Niraparib.    6/28/16: Cycle #1 D15 Niraparib.    7/5/16:  100.    7/11/16: Cycle #2 Niraparib.   83.    8/3/2016: PET CT IMPRESSION:    In this patient with known history of ovarian cancer:  1) New pleural based nodular opacities in the lateral and  inferior aspects of the bilateral lower lobes, worse in the left lung. Likely infection. Close follow up is recommended.      2) Slight decrease in hypermetabolic abdominal lymphadenopathy. 2 hypermetabolic lymph nodes persist.  3) Unchanged right hepatic lobe metastatic lesion.     8/9/16: Cycle #3 Niraparib.  69.  9/6/16: Cycle #4 Niraparib.  53. Dose held due to anemia.  9/13/16: Eval for potential cycle 4 niraparib. Dose held due to anemia.    10/4/16: CT CAP impression:   In this patient with a known history of ovarian cancer:  1. There has been interval resolution of pleural-based nodular  opacities which likely represented infection.  2. Abdominal lymphadenopathy in the form of 2 portacaval lymph nodes  have not significantly changed in size, noted to be hypermetabolic on  prior PET/CT.  3. Previously demonstrated metastatic lesion in the right lobe of the  liver is not significantly changed.  10/11/16:  60  11/1/16: C6 niraparib,  75  11/29/16: C7 niraparib.  78. CT CAP impression as follows:  Target lesions (RECIST criteria):    A previously described target lesion superior to the head of the  pancreas (series 2, image 64)  (referred to as a perihepatic node on  6/3/2016) may not be a valid target lesion because it measured less  than 1.5 cm originally. However, this particular node has decreased in  size, now measuring 7 mm in short axis versus 14 mm on 6/3/2016 when  measured in a similar fashion.    2.3 cm short axis portacaval lymph node on series 2 image 67,  previously 2.0 cm on 10/4/2016.    1.2 cm subtle hypodensity in hepatic segment 6 on series 2 image 75,  stable on multiple studies since at least 6/3/2016  Sum of diameters today: 3.5 cm. Sum of diameters 10/4/2016: 3.2 cm.  Growth = 9%.      12/27/16: C8 niraparib.  105.  1/25/17: C9 niraparib.  108    2/20/17: CT CAP Impression   IMPRESSION:   1. In this patient with history of ovarian cancer there is  stable  disease by RECIST criteria as evidenced by:    1a. Mildly increased size of liver metastasis.   1b. Stable portacaval lymphadenopathy.   1c. No evidence of metastatic disease in the chest.  2. Trace emphysematous changes of the lungs.    2/23/17:  pending, C10 niraparib  CT CAP impression:  Sum of target lesion diameters today: 3.7 cm. Sum of target lesion  diameters on 11/28/2016: 3.5 cm. Growth= 6%  1. In this patient with history of ovarian cancer there is stable  disease by RECIST criteria as evidenced by:   1a. Mildly increased size of liver metastasis.  1b. Stable portacaval lymphadenopathy.  1c. No evidence of metastatic disease in the chest.  2. Trace emphysematous changes of the lungs.  3/22/17: C11 niraparib.  132.  4/19/17: C12 niraparib.  127.    5/16/17: CT CAP  IMPRESSION: In this patient with ovarian cancer:  1. Mildly increased size of hepatic metastasis segment 6 with subtle  increased capsular retraction.  2. Stable edmundo hepatis nodes, with mild increase in size of  periaortic lymph nodes.  3. Prominent left supraclavicular lymph node with subtle increase in  size compared to prior studies, particularly comparing to 10/4/2016.  4. Mild subtle groundglass opacities in the right upper lobe, not  present on prior study, lesser extent in the right lower lobe.  Findings may represent infection, additional consideration is  malignancy (less likely), and attention on follow-up study  Recommended.  Addendum:   Prominent left supraclavicular lymph node (3/25) is stable from most  recent CT performed 2/20/2017, currently measuring 14 x 16 mm,  previously 14 x 16 mm on 2/20/2017.     The portal caval lymph node/edmundo hepatis lymph node is stable from  2/20/2017, measuring 21 mm.     Clarification of size of the para-aortic lymph node (series 3 image  327). It short axis measurement is 11 mm versus 9 mm on prior study.     Hepatic segment 6 triangular-shaped low density lesion  (3/362)  measures 14 mm, previously measured 12 mm, demonstrating a  possible/questionable minimal subtle increase in size. Similarly the  lymph nodes noted above in the supraclavicular and para-aortic regions  demonstrate possible minimal possible subtle increase in size.     5/18/17: C13 niraparib.  191.  6/16/17: C14 niraparib.  pending     Past Medical History:   Diagnosis Date     Antiplatelet or antithrombotic long-term use      Ascites      Blood clot in the legs      Diabetes (H)      Ovarian cancer (H)     serous,stg IV     Pleural effusion      Pulmonary embolism (H) 2/2012     Refusal of blood transfusions as patient is [employer]      Short gut syndrome      Subclinical hypothyroidism 4/18/2013     Thrombosis of leg        Past Surgical History:   Procedure Laterality Date     COLECTOMY       COLONOSCOPY  2/1/2012    Procedure:COLONOSCOPY; With Biopsy; Surgeon:TONI SULLIVAN; Location:[facility] OR     HYSTERECTOMY TOTAL ABD, RHIANNA SALPINGO-OOPHORECTOMY, NODE DISSECTION, TUMOR DEBULKING, COMBINED  2/1/2012    Procedure:COMBINED HYSTERECTOMY TOTAL ABDOMINAL, BILATERAL SALPINGO-OOPHORECTOMY, NODE DISSECTION, TUMOR DEBULKING;  Exploratory Laparotomy, Total Abdominal Hysterectomy, Bilateral Salpingo-Oophorectomy, appendectomy,lysis of adhesions, ileal, ascending, transverse and splenic flexure resection, ileal descending bowel renanastomosis, incidental cystotomy repair, CUSA procedure and colonoscopy ; [facility]     INSERT PORT PERITONEAL ACCESS  4/3/2012    Procedure:INSERT PORT PERITONEAL ACCESS; Intraperitoneal Port Placement (c-arm); Surgeon:SAMUEL CARRASCO; Location:[facility]     INSERT PORT PERITONEAL ACCESS  5/14/2014    Procedure: INSERT PORT PERITONEAL ACCESS;  Surgeon: Nga Yeung MD;  Location: [facility] OR     INSERT PORT VASCULAR ACCESS       LAPAROSCOPY DIAGNOSTIC (GYN)  5/14/2014    Procedure: LAPAROSCOPY DIAGNOSTIC (GYN);  Surgeon: Nga Yeung MD;  Location: [facility] OR      LAPAROTOMY EXPLORATORY Right 11/25/2015    Procedure: LAPAROTOMY EXPLORATORY;  Surgeon: Nga Yeung MD;  Location: [facility] OR     LAPAROTOMY, TUMOR DEBULKING, COMBINED  5/14/2014    Procedure: COMBINED LAPAROTOMY, TUMOR DEBULKING;  Surgeon: Nga Yeung MD;  Location: [facility] OR     REMOVE CATHETER PERITONEAL N/A 8/20/2014    Procedure: REMOVE CATHETER PERITONEAL;  Surgeon: Nga Yeung MD;  Location: [facility] OR     VASCULAR SURGERY      stent left iliac vein       Current Outpatient Prescriptions   Medication     ferrous sulfate (IRON) 325 (65 FE) MG tablet     study - niraparib (IDS# 4759) 100 mg capsule     study - niraparib (IDS# 4759) 100 mg capsule     study - niraparib (IDS# 4759) 100 mg capsule     LEVOTHYROXINE SODIUM PO     order for DME     LORazepam (ATIVAN) 1 MG tablet     METFORMIN HCL PO     diphenoxylate-atropine (LOMOTIL) 2.5-0.025 MG per tablet     cyanocobalamin (VITAMIN B12) 1000 MCG/ML injection     magnesium oxide (MAG-OX) 400 MG tablet     ASPIRIN PO     potassium chloride (KLOR-CON) 20 MEQ packet     VITAMIN E NATURAL PO     Cholecalciferol (VITAMIN D PO)     HERBALS     Ascorbic Acid (VITAMIN C PO)     Calcium Carbonate-Vitamin D (CALCIUM + D PO)     No current facility-administered medications for this visit.      Facility-Administered Medications Ordered in Other Visits   Medication     heparin 100 UNIT/ML injection 5 mL     heparin 100 UNIT/ML injection 500 Units          Allergies   Allergen Reactions     Nkda [No Known Drug Allergies]        Family History   Problem Relation Age of Onset     CANCER Mother 69     lung, smoker     CANCER Maternal Uncle 65     brain     Colon Cancer Maternal Aunt 80     colon       Social History     Social History     Marital status:      Spouse name: N/A     Number of children: N/A     Years of education: N/A     Occupational History     Not on file.     Social History Main Topics     Smoking status: Former Smoker     Years: 8.00      Types: Cigarettes     Quit date: 6/21/1980     Smokeless tobacco: Never Used      Comment: started at 11 yo and quit at 16 yo     Alcohol use Yes      Comment: 3x/day wine or jodi     Drug use: No     Sexual activity: Not on file     Other Topics Concern     Not on file     Social History Narrative       Physical Exam:    /83 (BP Location: Right arm, Patient Position: Chair, Cuff Size: Adult Regular)  Pulse 108  Temp 98  F (36.7  C) (Oral)  Resp 16  Wt 62.4 kg (137 lb 8 oz)  SpO2 99%  BMI 22.88 kg/m2  ECOG score-0    General: Alert non-toxic appearing female in no acute distress  HEENT: Normocephalic, atraumatic; PERRLA; no scleral icterus; oropharynx pink without lesions; neck supple 1cm lymph node palpable in the left supraclav/lower cervical region  Pulmonary: Lungs clear to auscultation, no increased work of breathing noted  Cardiac: Tachycardic, regular rhythm, S1S2, no clicks, murmurs, rubs, or gallops; bilateral lower extremities without edema, dorsalis pedis pulses 2+ bilaterally  GI: Normoactive bowel sounds x4 quadrants, abdomen soft, non-distended, and non-tender to palpation without masses or organomegaly  : Not indicated  Heme: Cervical, supraclavicular, and inguinal nodes without lymphadenopathy  MSK: Moves all extremities, no obvious muscle wasting  Neuro: No gross deficits, normal gait  Skin: Appropriate color for race, warm and dry, no rashes or lesions to unclothed skin  Psych: Pleasant and interactive, affect bright, makes appropriate eye contact, thought process linear    Labs:  Lab Results   Component Value Date    WBC 5.8 06/15/2017     Lab Results   Component Value Date    RBC 3.45 06/15/2017     Lab Results   Component Value Date    HGB 12.3 06/15/2017     Lab Results   Component Value Date    HCT 36.1 06/15/2017     Lab Results   Component Value Date     06/15/2017     Lab Results   Component Value Date    MCH 35.7 06/15/2017     Lab Results   Component Value Date     MCHC 34.1 06/15/2017     Lab Results   Component Value Date    RDW 12.7 06/15/2017     Lab Results   Component Value Date     06/15/2017     Last Basic Metabolic Panel:  Lab Results   Component Value Date     06/15/2017      Lab Results   Component Value Date    POTASSIUM 3.5 06/15/2017     Lab Results   Component Value Date    CHLORIDE 104 06/15/2017     Lab Results   Component Value Date    JOSE ALBERTO 8.7 06/15/2017     Lab Results   Component Value Date    CO2 23 06/15/2017     Lab Results   Component Value Date    BUN 18 06/15/2017     Lab Results   Component Value Date    CR 0.91 06/15/2017     Lab Results   Component Value Date     06/15/2017                Date Value Ref Range Status   06/15/2017 146 (H) 0 - 30 U/mL Final     Comment:     Assay Method:  Chemiluminescence using Siemens Centaur XP   05/18/2017 191 (H) 0 - 30 U/mL Final     Comment:     Assay Method:  Chemiluminescence using Siemens Centaur XP   04/19/2017 127 (H) 0 - 30 U/mL Final     Comment:     Assay Method:  Chemiluminescence using Siemens Centaur XP   03/22/2017 132 (H) 0 - 30 U/mL Final     Comment:     Assay Method:  Chemiluminescence using Siemens Centaur XP   02/23/2017 132 (H) 0 - 30 U/mL Final     Comment:     Assay Method:  Chemiluminescence using Siemens Centaur XP   01/25/2017 108 (H) 0 - 30 U/mL Final     Comment:     Assay Method:  Chemiluminescence using Siemens Centaur XP   12/27/2016 105 (H) 0 - 30 U/mL Final     Comment:     Assay Method:  Chemiluminescence using Siemens Centaur XP   11/28/2016 78 (H) 0 - 30 U/mL Final     Comment:     Assay Method:  Chemiluminescence using Siemens Centaur XP   11/01/2016 75 (H) 0 - 30 U/mL Final     Comment:     Assay Method:  Chemiluminescence using Siemens Centaur XP   10/11/2016 60 (H) 0 - 30 U/mL Final     Comment:     Assay Method:  Chemiluminescence using Siemens Centaur XP         Assessment/Plan:  1) Recurrent stage IIIC bilateral ovarian cancer: Patient  tolerating without dose limiting side effects. Proceed with cycle 1 niraparib. 5/11/17 CT CAP with RECIST shows stable disease with mildly increased size of liver metastasis and Mild subtle groundglass opacities in the right upper lobe.  = 191 as of 5/18/17,  pending from visit today. Patient will RTC in 1 month. Reviewed signs and symptoms for when she should contact the clinic or seek additional care, including but not limited to fever, chills, inability to keep down food or fluids, nausea and vomiting not controlled with antiemetics, and diarrhea leading to dehydration. Patient to contact the clinic with any questions or concerns in the interim. Betzy Jones RN in for teaching and study follow up.    2) Genetic counseling: Testing has been performed and she is negative for mutations in BRCA1, BRCA2, EPCAM, MLH1, MSH2, MSH6, PMS2, PTEN, and TP53 genes    3) Labs/Tests ordered: Will order scan at next visit and schedule for August.  pending from today, will repeat at next visit.     4) Health maintenance issues discussed include to continue following up with PCP for non-gynecologic concerns.    5) Patient verbalized understanding of and agreement with plan    A total of 20 minutes spent with patient, over 50% spent in counseling and coordination of care.      Nga Yeung MD    Department of Ob/Gyn and Women's Health  Division of Gynecologic Oncology  [facility]  [phone]    Patient Care Team:  Aubrie Hester as PCP - Patricia Rai APRN CNP as Nurse Practitioner (Nurse Practitioner)  Breonna Alvarado RN as Registered Nurse      I have reviewed the above note and agree with the scribe's notation as written.    I, Jarek Young, am serving as a scribe to document services personally performed by Nga Yeung MD, based upon my observations and the provider's statements to me. All documentation has been reviewed by the  aforementioned doctor prior to being entered into the official medical record.       Again, thank you for allowing me to participate in the care of your patient.      Sincerely,    Nga Yeung MD

## 2017-06-15 NOTE — MR AVS SNAPSHOT
After Visit Summary   6/15/2017    Odalys Nathan    MRN: [id number]           Patient Information     Date Of Birth          1960        Visit Information        Provider Department      6/15/2017 1:20 PM Nga Yeung MD [facility]        Today's Diagnoses     Ovarian cancer, right (H)    -  1    Ovarian cancer, left (H)        Drug-induced neutropenia (H)           Follow-ups after your visit        Your next 10 appointments already scheduled     Jul 13, 2017  8:30 AM CDT   [facility] Lab Draw with  [facility] LAB DRAW   [facility] Lab Draw ([facility])    [address] [place] [address] [id number] [phone]            Jul 13, 2017  9:00 AM CDT   (Arrive by 8:45 AM)   Return Active Treatment with HAILE Meza CNP   [facility] ([facility])    [address] [place] [address] [id number] [phone]              Who to contact     If you have questions or need follow up information about today's clinic visit or your schedule please contact [facility] directly at [phone].  Normal or non-critical lab and imaging results will be communicated to you by MyChart, letter or phone within 4 business days after the clinic has received the results. If you do not hear from us within 7 days, please contact the clinic through [employer]View[employer] or phone. If you have a critical or abnormal lab result, we will notify you by phone as soon as possible.  Submit refill requests through Interwise or call your pharmacy and they will forward the refill request to us. Please allow 3 business days for your refill to be completed.          Additional Information About Your Visit        WinViewhart Information     Interwise gives you secure access to your electronic health record. If you see a primary care provider, you can also send messages to your care team and  make appointments. If you have questions, please call your primary care clinic.  If you do not have a primary care provider, please call [phone] and they will assist you.        Care EveryWhere ID     This is your Care EveryWhere ID. This could be used by other organizations to access your [place] medical records  [id number]        Your Vitals Were     Pulse Temperature Respirations Pulse Oximetry BMI (Body Mass Index)       108 98  F (36.7  C) (Oral) 16 99% 22.88 kg/m2        Blood Pressure from Last 3 Encounters:   06/15/17 146/83   05/18/17 144/69   04/19/17 135/61    Weight from Last 3 Encounters:   06/15/17 62.4 kg (137 lb 8 oz)   05/18/17 62.8 kg (138 lb 7.2 oz)   04/19/17 64.8 kg (142 lb 12.8 oz)              We Performed the Following     Amylase          CBC with platelets differential     Comprehensive metabolic panel     GGT     Lactate Dehydrogenase     Magnesium     Routine UA with microscopic          Today's Medication Changes          These changes are accurate as of: 6/15/17 11:59 PM.  If you have any questions, ask your nurse or doctor.               These medicines have changed or have updated prescriptions.        Dose/Directions    cyanocobalamin 1000 MCG/ML injection   Commonly known as:  VITAMIN B12   This may have changed:  when to take this   Used for:  B12 deficiency        Dose:  1 mL   Inject 1 mL (1,000 mcg) into the muscle every 30 days   Quantity:  1 mL   Refills:  11       magnesium oxide 400 MG tablet   Commonly known as:  MAG-OX   This may have changed:  when to take this   Used for:  Ovarian cancer, unspecified laterality (H)        Dose:  400 mg   Take 1 tablet (400 mg) by mouth 2 times daily   Quantity:  90 tablet   Refills:  3       * study - niraparib 100 mg capsule   Commonly known as:  IDS# [id number]   This may have changed:  Another medication with the same name was added. Make sure you understand how and when to take each.   Used for:  Ovarian cancer, right (H),  Ovarian cancer, left (H), Drug-induced neutropenia (H)   Changed by:  Patricia Rocha APRN CNP        Dose:  200 mg   Take 2 capsules (200 mg) by mouth every morning Take at the same time each day, preferably morning. Swallow whole and do NOT chew capsules. Food and water is permissible. First dose will be administered on site.   Quantity:  84 capsule   Refills:  0       * study - niraparib 100 mg capsule   Commonly known as:  IDS# [id number]   This may have changed:  Another medication with the same name was added. Make sure you understand how and when to take each.   Used for:  Ovarian cancer, right (H), Ovarian cancer, left (H)   Changed by:  Patricia Rocha APRN CNP        Dose:  200 mg   Take 2 capsules (200 mg) by mouth every morning Take at the same time each day, preferably morning. Swallow whole and do NOT chew capsules. Food and water is permissible. First dose will be administered on site.   Quantity:  84 capsule   Refills:  0       * study - niraparib 100 mg capsule   Commonly known as:  IDS# [id number]   This may have changed:  Another medication with the same name was added. Make sure you understand how and when to take each.   Used for:  Ovarian cancer, right (H), Ovarian cancer, left (H), Drug-induced neutropenia (H)   Changed by:  Nga Yeung MD        Dose:  200 mg   Take 2 capsules (200 mg) by mouth every morning Take at the same time each day, preferably morning. Swallow whole and do NOT chew capsules. Food and water is permissible. First dose will be administered on site.   Quantity:  84 capsule   Refills:  0       * study - niraparib 100 mg capsule   Commonly known as:  IDS# [id number]   This may have changed:  You were already taking a medication with the same name, and this prescription was added. Make sure you understand how and when to take each.   Used for:  Ovarian cancer, right (H), Ovarian cancer, left (H), Drug-induced neutropenia (H)   Changed by:  Nga Yeung MD        Dose:   200 mg   Take 2 capsules (200 mg) by mouth every morning Take at the same time each day, preferably morning. Swallow whole and do NOT chew capsules. Food and water is permissible. First dose will be administered on site.   Quantity:  84 capsule   Refills:  0       * Notice:  This list has 4 medication(s) that are the same as other medications prescribed for you. Read the directions carefully, and ask your doctor or other care provider to review them with you.         Where to get your medicines      Some of these will need a paper prescription and others can be bought over the counter.  Ask your nurse if you have questions.     Bring a paper prescription for each of these medications     study - niraparib 100 mg capsule                Primary Care Provider Office Phone # Fax #    Aubrie Hester [phone]       [facility] [address] [facility] [address]        Equal Access to Services     SANDY CHAMBERS : Hadii bj landao Socait, waaxda luqstephanie, qaybta kaalmada bel, blanka beckman . So [facility] [phone].    ATENCIÓN: Si habla español, tiene a bell disposición servicios gratuitos de asistencia lingüística. Llame al [phone].    We comply with applicable federal civil rights laws and [place] laws. We do not discriminate on the basis of race, color, national origin, age, disability sex, sexual orientation or gender identity.            Thank you!     Thank you for choosing [facility]  for your care. Our goal is always to provide you with excellent care. Hearing back from our patients is one way we can continue to improve our services. Please take a few minutes to complete the written survey that you may receive in the mail after your visit with us. Thank you!             Your Updated Medication List - Protect others around you: Learn how to safely use, store and throw away your medicines at www.disposemymeds.org.           This list is accurate as of: 6/15/17 11:59 PM.  Always use your most recent med list.                   Brand Name Dispense Instructions for use Diagnosis    ASPIRIN PO      Take 325 mg by mouth daily        CALCIUM + D PO      Take 1 tablet by mouth daily.    Pelvic mass       cyanocobalamin 1000 MCG/ML injection    VITAMIN B12    1 mL    Inject 1 mL (1,000 mcg) into the muscle every 30 days    B12 deficiency       diphenoxylate-atropine 2.5-0.025 MG per tablet    LOMOTIL    60 tablet    Take 2 tablets by mouth 4 times daily as needed for diarrhea    Acute diarrhea       ferrous sulfate 325 (65 FE) MG tablet    IRON    90 tablet    Take 1 tablet (325 mg) by mouth 3 times daily (with meals) Take with small amount of orange juice, do not take with calcium    Ovarian cancer, right (H), Anemia, unspecified type, Ovarian cancer, left (H)       HERBALS      daily        LEVOTHYROXINE SODIUM PO      Take by mouth daily        LORazepam 1 MG tablet    ATIVAN    30 tablet    Take 1 tablet (1 mg) by mouth every 6 hours as needed (Anxiety, Nausea/Vomiting or Sleep)    Ovarian cancer, unspecified laterality (H), Drug induced neutropenia(288.03)       magnesium oxide 400 MG tablet    MAG-OX    90 tablet    Take 1 tablet (400 mg) by mouth 2 times daily    Ovarian cancer, unspecified laterality (H)       METFORMIN HCL PO      Take 500 mg by mouth daily        order for DME     3 each    Injection Supplies for Vitamin B12: 3cc syringes w/ 27 gauge needles, 1/2 inch length    B12 deficiency       potassium chloride 20 MEQ Packet    KLOR-CON     Take 20 mEq by mouth daily        * study - niraparib 100 mg capsule    IDS# [id number]    84 capsule    Take 2 capsules (200 mg) by mouth every morning Take at the same time each day, preferably morning. Swallow whole and do NOT chew capsules. Food and water is permissible. First dose will be administered on site.    Ovarian cancer, right (H), Ovarian cancer, left (H), Drug-induced  neutropenia (H)       * study - niraparib 100 mg capsule    IDS# [id number]    84 capsule    Take 2 capsules (200 mg) by mouth every morning Take at the same time each day, preferably morning. Swallow whole and do NOT chew capsules. Food and water is permissible. First dose will be administered on site.    Ovarian cancer, right (H), Ovarian cancer, left (H)       * study - niraparib 100 mg capsule    IDS# [id number]    84 capsule    Take 2 capsules (200 mg) by mouth every morning Take at the same time each day, preferably morning. Swallow whole and do NOT chew capsules. Food and water is permissible. First dose will be administered on site.    Ovarian cancer, right (H), Ovarian cancer, left (H), Drug-induced neutropenia (H)       * study - niraparib 100 mg capsule    IDS# [id number]    84 capsule    Take 2 capsules (200 mg) by mouth every morning Take at the same time each day, preferably morning. Swallow whole and do NOT chew capsules. Food and water is permissible. First dose will be administered on site.    Ovarian cancer, right (H), Ovarian cancer, left (H), Drug-induced neutropenia (H)       VITAMIN C PO      Take 500 mg by mouth daily    Pelvic mass       VITAMIN D PO      Take 1,000 Units by mouth daily Unknown dose        VITAMIN E NATURAL PO      Take 100 Units by mouth daily        * Notice:  This list has 4 medication(s) that are the same as other medications prescribed for you. Read the directions carefully, and ask your doctor or other care provider to review them with you.

## 2017-06-24 ENCOUNTER — HEALTH MAINTENANCE LETTER (OUTPATIENT)
Age: 57
End: 2017-06-24

## 2017-07-12 NOTE — PROGRESS NOTES
Follow Up Notes on Referred Patient    Date: 2017       Dr. Aubrie Hester  [facility]  [address] [place] [address]       RE: Odalys Nathan  : 1960  HOLA: 2017    Dear Dr. Aubrie Hester:    Odalys Nathan is a 56 year old woman with a diagnosis of recurrent stage IIIC bilateral ovarian cancer.   She is currently on the TESARO study. She is here today for follow up and disease management.     Brief Oncology History:  2012 - Admitted to hospital for 2 weeks of intermittent abdominal cramping, distention, diarrhea and N/V. CT of abdomen/pelvis significant for small bowel obstruction, a heterogenous soft tissue density in the pelvis, omental nodules, and ascites. Bilateral adnexal masses per U/S of pelvis. CA-125 was elevated at 987, CEA was normal at 1.0.    12 - Therapeutic paracentesis (4 L) with cytology confirming malignancy (AZ positive, weak ER positive, CK-7 positive consistent with GYN primary). Surgery recommended d/t potential for falling blood counts 2 chemotherapy (patient is [place] and limiting blood transfusion)    12 - Exploratory laparotomy, MAR BSO, lysis of adhesion, Appendectomy, Repair cystotomy, Omentectomy Pnfs-gdbkdn-pvugoropi-transverse-colon resection, Ileo-descending colon anastomosis, CUSA, & Colonoscopy (done by Dr. Amato and colorectal team).  2012 - Admitted to hospital for bilateral pulmonary emboli and drainage of pleural effusion. Started on Lovenox.  12-3/21/12: Cycle #1-2 Carbo/Taxol IV  3/29/12 - Started on Keflex by her PCP for infection in her healing wound (immediately below her umbilicus).    12-12: Cycle #3-6 IV chemo, Cycle #1 IV/IP chemo  12 -  8, CT PRADEEP - enrolled in  - observation arm  3/4/13-13:  6, 9, 12, 15, 20.  Decision made to start Doxil/Carbo.  13: The left ventricular ejection fraction is normal at  "66.4%.  7/12/13-9/6/13: Cycle #1-3 Doxil/Carbo.  10, 11.    2/20/14: Decision to take break from chemo for two months, followed by CT and CA-125.    4/21/14  27  exploratory laparotomy and removal of mesenteric masses, tumor debulking, peritoneal biopsies and intraperitoneal port placement. On laparoscopy, it was noted that there were small nodules on the anterior abdominal wall near the previous incision, small nodules on the right pelvic sidewall as well. Nodules were palpated in the mesentery; however, as it was unable to clarify where the origin of the nodules was, the decision was made to open the patient. On opening there was found to be approximately a 3 cm nodule in the small bowel mesentery and another separate approximately 2 cm nodule in the bowel mesentery. Pelvis without evidence of cancer, some mesenteric lymph nodes were palpated. No evidence otherwise of any disseminated cancer throughout the abdomen.    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 (2/2)  -Largest metastasis: 1.3 cm  -See comment  D: Peritoneum, right paracolic gutter #2, biopsy:  -Fibroadipose tissue with granulomatous inflammation surrounding refractile material  -Negative for malignancy   E: Small bowel adhesion, biopsy:  -Fibroconnective tissue, consistent with adhesion  -Negative for malignancy  F: Lymph nodes, mesentry, not otherwise specified, excision:  -Two lymph nodes, negative for metastatic carcinoma (0/2)  G: Lymph node, mesentery, \"#2\", excision:  -One lymph node, negative for metastatic carcinoma (0/1)  H: Lymph nodes, mesentery, \"nodule #2\", excision:  -Five " lymph nodes, negative for metastatic carcinoma (0/5)  COMMENT:  Some of the specimens show post-operative changes. Others show possible treatment related changes, including necrosis. The metastatic carcinoma in the mesenteric lymph nodes (specimen C) shows variable morphology, including relatively low grade tumor with papillary architecture, and high grade tumor comprised of nests of tumor cells with irregular, slit-like spaces and marked nuclear pleomorphism.    5/29/14: Cycle 1 IV PACLitaxel / IP CISplatin / IP PACLitaxel.  - 28.  6/26/14: Cycle #2 IV/IP.  10  8/5/14: CT chest/abd/pelvis IMPRESSION     1. In this patient with ovarian cancer, overall findings are indicative of stable/slight improvement, as multiple mesenteric lymphadenopathy and scattered nodular peritoneal soft tissue mass lesions appear unchanged or slightly smaller since 4/21/2014.    2. 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 Mild dilatation of the second and the third portion of the duodenum with a narrow SMA angle. This could represent SMA syndrome, if clinically correlated.17/14:    Cycle #3 IV/IP.  10.    8/7/14: Cycle #4 Taxol/Carbo (changed from IV/IP).  10. She has been feeling okay. She is unsure if she can finish out the course of 6 cycles IV/IP taxol/cisplatin. She feels like she has the flu for about a week then starts feeling gradually better after each chemo cycle. Her spouse notes that she actually has been more sick with the treatments than she initially admits here. She was also previously having some rib pain. Denies any rib pain now. Denies any chest pain or shortness of breath.  8/20/14: Remove Intraperitoneal Port ( Port and catheter intact - discarded)  8/28/14: Cycle #5 Taxol/Carbo held due to thrombocytopenia.  6.    9/29/14: Cycle #6 Taxol/Carbo  6. Insurance questions regarding GSF coverage today. No concerns other than fatigue. Taking iron for anemia and does not  desire blood transfusion. Using neulasta for neutropenia. Using home IV hydration if needed. Baseline unchanged. No abdominal bloating, constipation, diarrhea, pain, vaginal or rectal bleeding, cough or dyspnea, fluid retention.      10/20/14:  5. CT chest/abdomen/pelvis on 10/16/14 showed nodular peritoneal soft tissue mass in the right lower quadrant adjacent to the psoas muscle is no longer appreciated. Adjacent prominent lymphadenopathy is unchanged from previous exam. 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  1/27/15:  6.  4/28/15:  14.  5/26/15:  18.     6/2/15: CT cap Impression:  1. Postsurgical changes of hysterectomy and bilateral salpingo-oophorectomy for ovarian cancer. There is a new 8 mm hazy, ill-defined hypoattenuating lesion in hepatic segment 6 which is suspicious for a metastatic deposit. Further evaluation with ultrasound in recommended.    2. Increased size of a left retroperitoneal lymph node which is indeterminate but may represent a ciro metastasis. Mildly prominent lymph nodes in the right lower quadrant are not significantly changed.  3. Moderate colonic stool burden.    6/4/15: US abdomen IMPRESSION:    Hyperechoic lesion in the right hepatic lobe, consistent with hemangioma. This does not corresponds to the area of the lesion seen on CT from 6/2/2015. An MR would be helpful for identifying and characterizing the lesion from the recent CT.  6/12/15: MR abd IMPRESSION:  1. New 20 x 11 mm enhancing lesions between the right obliques, concerning for metastatic disease. This lesions should be amenable to percutaneous biopsy, if indicated.  2. Correlating to the lesion visualized on comparison CT is a hepatic segment 6 subcapsular 7 mm lesion. Overall the appearance favors the diagnosis of a simple cyst. However, there is faint suggestion of mild peripheral arterial enhancement. Although this is favored as  artifactual, this  should be followed up to confirm stability. Recommend 6 month followup.  3. Hepatic segment 6, 5 mm lesion too small to technically characterize. Differential would favor FNH, less likely flash filling hemangioma. Recommend attention on followup.     9/1/15-10/15/15: Cycle #1-3 Avastin/Cytoxan.   31, 19, 16.     11/6/15: CT c/a/p IMPRESSION:    1. Stable postoperative change of MAR/BSO for ovarian cancer.  2. The lesion in the right flank abdominal musculature is slightly decreased in size. Otherwise, stable examination.  2. No evidence of metastatic disease in the chest.     11/20/15:  16     11/25/15: surgical pathology report  FINAL DIAGNOSIS:  Soft tissue, right oblique muscle mass, excision:  -Recurrent ovarian serous carcinoma  -Carcinoma is present less than 1 mm from one resection margin  -Background skeletal muscle and fibroadipose tissue     1/4/16:  22  1/11/16-1/27/16: Radiation to right flank x 12 treatments     4/7/2016:  94. CT cap IMPRESSION:    In this patient with ovarian cancer status post MAR/BSO and descending/transverse colectomy:  1. No evidence for malignancy in the chest, abdomen, or pelvis.  2. Stable small hypodense segment 6 liver lesion, appears more likely benign, possibly a cyst.     5/13/16:  124.     6/3/16: PET CT IMPRESSION: In this patient with a history of ovarian cancer:  1. Hypermetabolic and enlarging periaortic and perihepatic lymphadenopathy compatible with metastatic disease, as detailed above.  2. Although hypodense lesion in hepatic segment 6 has been present since 6/2/2015 associated hypermetabolism makes this lesion highly concerning for metastatic disease.     6/9/16:  137.  6/14/16: Cycle #1 Niraparib.    6/28/16: Cycle #1 D15 Niraparib.    7/5/16:  100.    7/11/16: Cycle #2 Niraparib.   83.     8/3/2016: PET CT IMPRESSION:    In this patient with known history of ovarian cancer:  1) New pleural based nodular opacities  in the lateral and inferior aspects of the bilateral lower lobes, worse in the left lung. Likely infection. Close follow up is recommended.      2) Slight decrease in hypermetabolic abdominal lymphadenopathy. 2 hypermetabolic lymph nodes persist.  3) Unchanged right hepatic lobe metastatic lesion.      8/9/16: Cycle #3 Niraparib.  69.  9/6/16: Cycle #4 Niraparib.  53. Dose held due to anemia.  9/13/16: Eval for potential cycle 4 niraparib. Dose held due to anemia.     10/4/16: CT CAP impression:   In this patient with a known history of ovarian cancer:  1. There has been interval resolution of pleural-based nodular opacities which likely represented infection.  2. Abdominal lymphadenopathy in the form of 2 portacaval lymph nodes have not significantly changed in size, noted to be hypermetabolic on prior PET/CT.  3. Previously demonstrated metastatic lesion in the right lobe of the liver is not significantly changed.  10/11/16:  60  11/1/16: Cycle #6 Niraparib,  75  11/29/16: Cycle #7 Niraparib.  78. CT CAP impression as follows:  Target lesions (RECIST criteria):    A previously described target lesion superior to the head of the pancreas (series 2, image 64)  (referred to as a perihepatic node on  6/3/2016) may not be a valid target lesion because it measured less than 1.5 cm originally. However, this particular node has decreased in size, now measuring 7 mm in short axis versus 14 mm on 6/3/2016 when measured in a similar fashion.    2.3 cm short axis portacaval lymph node on series 2 image 67, previously 2.0 cm on 10/4/2016.  1.2 cm subtle hypodensity in hepatic segment 6 on series 2 image 75,stable on multiple studies since at least 6/3/2016 Sum of diameters today: 3.5 cm. Sum of diameters 10/4/2016: 3.2 cm. Growth = 9%.       12/27/16: Cycle #8 Niraparib.  105.  1/25/17: Cycle #9 Niraparib.  108     2/20/17: CT CAP Impression IMPRESSION:   1. In this patient with  history of ovarian cancer there is stable  disease by RECIST criteria as evidenced by:                           1a. Mildly increased size of liver metastasis.                          1b. Stable portacaval lymphadenopathy.                          1c. No evidence of metastatic disease in the chest.  2. Trace emphysematous changes of the lungs.     2/23/17:  pending, Cycle #10 Niraparib  CT CAP impression:  Sum of target lesion diameters today: 3.7 cm. Sum of target lesion diameters on 11/28/2016: 3.5 cm. Growth= 6%  1. In this patient with history of ovarian cancer there is stable disease by RECIST criteria as evidenced by:   1a. Mildly increased size of liver metastasis.  1b. Stable portacaval lymphadenopathy.  1c. No evidence of metastatic disease in the chest.  2. Trace emphysematous changes of the lungs.  3/22/17: Cycle #11 Niraparib.  132.  4/19/17: Cycle #12 Niraparib.  127.     5/16/17: CT CAP IMPRESSION: In this patient with ovarian cancer:  1. Mildly increased size of hepatic metastasis segment 6 with subtle increased capsular retraction.  2. Stable edmundo hepatis nodes, with mild increase in size of periaortic lymph nodes.  3. Prominent left supraclavicular lymph node with subtle increase in size compared to prior studies, particularly comparing to 10/4/2016.  4. Mild subtle groundglass opacities in the right upper lobe, not present on prior study, lesser extent in the right lower lobe.  Findings may represent infection, additional consideration is malignancy (less likely), and attention on follow-up study  Recommended.  Addendum:   Prominent left supraclavicular lymph node (3/25) is stable from most recent CT performed 2/20/2017, currently measuring 14 x 16 mm, previously 14 x 16 mm on 2/20/2017.      The portal caval lymph node/edmundo hepatis lymph node is stable from 2/20/2017, measuring 21 mm.      Clarification of size of the para-aortic lymph node (series 3 image 327). It short axis  measurement is 11 mm versus 9 mm on prior study.      Hepatic segment 6 triangular-shaped low density lesion (3/362) measures 14 mm, previously measured 12 mm, demonstrating a  possible/questionable minimal subtle increase in size. Similarly the lymph nodes noted above in the supraclavicular and para-aortic regions demonstrate possible minimal possible subtle increase in size.      5/18/17: Cycle #13 Niraparib.  191.  6/15/17: Cycle #14 Niraparib.  146.  7/13/17: Cycle #15 Niraparib 200 mg.  pending.         Today she comes to clinic feeling generally well and denies any new issues/concerns. She denies any vaginal bleeding, no changes in her bowel (has liquid stools which is not new) or bladder habits, no nausea/emesis, no lower extremity edema, and no difficulties eating or sleeping. She denies any abdominal discomfort/bloating, no fevers or chills, and no chest pain or shortness of breath. She is taking her Niraparib and is not needing to take any antiemetic beforehand. She is still able to do all of her same activities; she does have some fatigue but this is the same as previous. She does not need any medication refills.         Review of Systems     Constitutional:  Positive for fatigue. Negative for fever, chills, weight loss, weight gain, decreased appetite, night sweats, recent stressors, height gain, height loss, post-operative complications, incisional pain, hallucinations, increased energy, hyperactivity and confused.   HENT:  Positive for tinnitus. Negative for ear pain, hearing loss, nosebleeds, trouble swallowing, hoarse voice, mouth sores, sore throat, ear discharge, tooth pain, gum tenderness, taste disturbance, smell disturbance, hearing aid, bleeding gums, dry mouth, sinus pain, sinus congestion and neck mass.    Eyes:  Negative for double vision, pain, redness, eye pain, decreased vision, eye watering, eye bulging, eye dryness, flashing lights, spots, floaters, strabismus, tunnel  vision, jaundice and eye irritation.   Respiratory:   Negative for cough, hemoptysis, sputum production, shortness of breath, wheezing, sleep disturbances due to breathing, snores loudly, respiratory pain, dyspnea on exertion, cough disturbing sleep and postural dyspnea.    Cardiovascular:  Negative for chest pain, dyspnea on exertion, palpitations, orthopnea, claudication, leg swelling, fingers/toes turn blue, hypertension, hypotension, syncope, history of heart murmur, chest pain on exertion, chest pain at rest, pacemaker, few scattered varicosities, leg pain, sleep disturbances due to breathing, tachycardia, light-headedness, exercise intolerance and edema.   Gastrointestinal:  Positive for diarrhea. Negative for heartburn, nausea, vomiting, abdominal pain, constipation, blood in stool, melena, rectal pain, bloating, hemorrhoids, bowel incontinence, jaundice, rectal bleeding, coffee ground emesis and change in stool.   Genitourinary:  Negative for bladder incontinence, dysuria, urgency, hematuria, flank pain, vaginal discharge, difficulty urinating, genital sores, dyspareunia, decreased libido, nocturia, voiding less frequently, arousal difficulty, abnormal vaginal bleeding, excessive menstruation, menstrual changes, hot flashes, vaginal dryness and postmenopausal bleeding.   Musculoskeletal:  Negative for myalgias, back pain, joint swelling, arthralgias, stiffness, muscle cramps, neck pain, bone pain, muscle weakness and fracture.   Skin:  Negative for nail changes, itching, poor wound healing, rash, hair changes, skin changes, acne, warts, poor wound healing, scarring, flaky skin, Raynaud's phenomenon, sensitivity to sunlight and skin thickening.   Neurological:  Negative for dizziness, tingling, tremors, speech change, seizures, loss of consciousness, weakness, light-headedness, numbness, headaches, disturbances in coordination, extremity numbness, memory loss, difficulty walking and paralysis.   Endo/Heme:   Negative for anemia, swollen glands and bruises/bleeds easily.   Psychiatric/Behavioral:  Negative for depression, hallucinations, memory loss, decreased concentration, mood swings and panic attacks.    Breast:  Negative for breast discharge, breast mass, breast pain and nipple retraction.   Endocrine:  Negative for altered temperature regulation, polyphagia, polydipsia, unwanted hair growth and change in facial hair.        Past Medical History:    Past Medical History:   Diagnosis Date     Antiplatelet or antithrombotic long-term use      Ascites      Blood clot in the legs      Diabetes (H)      Ovarian cancer (H)     serous,stg IV     Pleural effusion      Pulmonary embolism (H) 2/2012     Refusal of blood transfusions as patient is [place]      Short gut syndrome      Subclinical hypothyroidism 4/18/2013     Thrombosis of leg          Past Surgical History:    Past Surgical History:   Procedure Laterality Date     COLECTOMY       COLONOSCOPY  2/1/2012    Procedure:COLONOSCOPY; With Biopsy; Surgeon:TONI SULLIVAN; Location:[facility] OR     HYSTERECTOMY TOTAL ABD, RHIANNA SALPINGO-OOPHORECTOMY, NODE DISSECTION, TUMOR DEBULKING, COMBINED  2/1/2012    Procedure:COMBINED HYSTERECTOMY TOTAL ABDOMINAL, BILATERAL SALPINGO-OOPHORECTOMY, NODE DISSECTION, TUMOR DEBULKING;  Exploratory Laparotomy, Total Abdominal Hysterectomy, Bilateral Salpingo-Oophorectomy, appendectomy,lysis of adhesions, ileal, ascending, transverse and splenic flexure resection, ileal descending bowel renanastomosis, incidental cystotomy repair, CUSA procedure and colonoscopy ; [facility]     INSERT PORT PERITONEAL ACCESS  4/3/2012    Procedure:INSERT PORT PERITONEAL ACCESS; Intraperitoneal Port Placement (c-arm); Surgeon:SAMUEL CARRASCO; Location:[facility]     INSERT PORT PERITONEAL ACCESS  5/14/2014    Procedure: INSERT PORT PERITONEAL ACCESS;  Surgeon: Nga Yeung MD;  Location: [facility] OR     INSERT PORT VASCULAR ACCESS       LAPAROSCOPY  DIAGNOSTIC (GYN)  5/14/2014    Procedure: LAPAROSCOPY DIAGNOSTIC (GYN);  Surgeon: Nga Yeung MD;  Location: [facility] OR     LAPAROTOMY EXPLORATORY Right 11/25/2015    Procedure: LAPAROTOMY EXPLORATORY;  Surgeon: Nga Yeung MD;  Location: [facility]     LAPAROTOMY, TUMOR DEBULKING, COMBINED  5/14/2014    Procedure: COMBINED LAPAROTOMY, TUMOR DEBULKING;  Surgeon: Nga Yeung MD;  Location: [facility] OR     REMOVE CATHETER PERITONEAL N/A 8/20/2014    Procedure: REMOVE CATHETER PERITONEAL;  Surgeon: Nga Yeung MD;  Location: [facility] OR     VASCULAR SURGERY      stent left iliac vein         Health Maintenance Due   Topic Date Due     TETANUS IMMUNIZATION (SYSTEM ASSIGNED)  08/03/1976     ADVANCE DIRECTIVE PLANNING Q5 YRS  08/03/1976     LIPID SCREEN Q5 YR FEMALE (SYSTEM ASSIGNED)  08/03/2003     MAMMO SCREEN Q2 YR (SYSTEM ASSIGNED)  02/01/2015     PAP SCREENING Q3 YR (SYSTEM ASSIGNED)  04/04/2016       Current Medications:     Current Outpatient Prescriptions   Medication Sig Dispense Refill     study - niraparib (IDS# [id number]) 100 mg capsule Take 2 capsules (200 mg) by mouth every morning Take at the same time each day, preferably morning. Swallow whole and do NOT chew capsules. Food and water is permissible. First dose will be administered on site. 84 capsule 0     ferrous sulfate (IRON) 325 (65 FE) MG tablet Take 1 tablet (325 mg) by mouth 3 times daily (with meals) Take with small amount of orange juice, do not take with calcium 90 tablet 3     study - niraparib (IDS# [id number]) 100 mg capsule Take 2 capsules (200 mg) by mouth every morning Take at the same time each day, preferably morning. Swallow whole and do NOT chew capsules. Food and water is permissible. First dose will be administered on site. 84 capsule 0     study - niraparib (IDS# [id number]) 100 mg capsule Take 2 capsules (200 mg) by mouth every morning Take at the same time each day, preferably morning. Swallow whole and do NOT chew capsules. Food and  water is permissible. First dose will be administered on site. 84 capsule 0     study - niraparib (IDS# [id number]) 100 mg capsule Take 2 capsules (200 mg) by mouth every morning Take at the same time each day, preferably morning. Swallow whole and do NOT chew capsules. Food and water is permissible. First dose will be administered on site. 84 capsule 0     LEVOTHYROXINE SODIUM PO Take by mouth daily        order for DME Injection Supplies for Vitamin B12: 3cc syringes w/ 27 gauge needles, 1/2 inch length 3 each 0     LORazepam (ATIVAN) 1 MG tablet Take 1 tablet (1 mg) by mouth every 6 hours as needed (Anxiety, Nausea/Vomiting or Sleep) 30 tablet 2     METFORMIN HCL PO Take 500 mg by mouth daily       diphenoxylate-atropine (LOMOTIL) 2.5-0.025 MG per tablet Take 2 tablets by mouth 4 times daily as needed for diarrhea 60 tablet 0     cyanocobalamin (VITAMIN B12) 1000 MCG/ML injection Inject 1 mL (1,000 mcg) into the muscle every 30 days (Patient taking differently: Inject 1 mL into the muscle once a week ) 1 mL 11     magnesium oxide (MAG-OX) 400 MG tablet Take 1 tablet (400 mg) by mouth 2 times daily (Patient taking differently: Take 400 mg by mouth daily ) 90 tablet 3     ASPIRIN PO Take 325 mg by mouth daily       potassium chloride (KLOR-CON) 20 MEQ packet Take 20 mEq by mouth daily       VITAMIN E NATURAL PO Take 100 Units by mouth daily       Cholecalciferol (VITAMIN D PO) Take 1,000 Units by mouth daily Unknown dose       HERBALS daily        Ascorbic Acid (VITAMIN C PO) Take 500 mg by mouth daily        Calcium Carbonate-Vitamin D (CALCIUM + D PO) Take 1 tablet by mouth daily.           Allergies:        Allergies   Allergen Reactions     Nkda [No Known Drug Allergies]         Social History:     Social History   Substance Use Topics     Smoking status: Former Smoker     Years: 8.00     Types: Cigarettes     Quit date: 6/21/1980     Smokeless tobacco: Never Used      Comment: started at 11 yo and quit at 16 yo      "Alcohol use Yes      Comment: 3x/day wine or jodi       History   Drug Use No         Family History:     The patient's family history is notable for:    Family History   Problem Relation Age of Onset     CANCER Mother 69     lung, smoker     CANCER Maternal Uncle 65     brain     Colon Cancer Maternal Aunt 80     colon         Physical Exam:     /69  Pulse 110  Temp 97.8  F (36.6  C)  Resp 16  Ht 1.651 m (5' 5\")  Wt 62 kg (136 lb 11.2 oz)  SpO2 100%  BMI 22.75 kg/m2  Body mass index is 22.75 kg/(m^2).    General Appearance: healthy and alert, no distress     HEENT: no thyromegaly, no palpable nodules or masses        Cardiovascular: regular rate and rhythm, no gallops, rubs or murmurs     Respiratory: lungs clear, no rales, rhonchi or wheezes, normal diaphragmatic excursion    Musculoskeletal: extremities non tender and without edema    Skin: no lesions or rashes     Neurological: normal gait, no gross defects     Psychiatric: appropriate mood and affect                               Hematological: normal cervical, supraclavicular lymph nodes     Gastrointestinal:       abdomen soft, non-tender, non-distended, no organomegaly or masses    Genitourinary: Not indicated      Assessment:    Odalys Nathan is a 56 year old woman with a diagnosis of recurrent stage IIIC bilateral ovarian cancer.   She is currently on the TESARO study. She is here today for follow up and disease management.     25 minutes were spent with this patient, over 50% of that time was spent in symptom management, treatment planning and in counseling and coordination of care.      Plan:     1.)        Ok to continue with Niraparib 200 mg/day. She will have imaging and then see Dr. Yeung to discuss results. Reviewed signs and symptoms for when she should contact the clinic or seek additional care. Patient to contact the clinic with any questions or concerns in the interim.    ECOG = 0.     2.) Genetic risk factors were assessed " and she is negative for mutations in BRCA1, BRCA2, EPCAM, MLH1, MSH2, MSH6, PMS2, PTEN, and TP53 genes    3.) Labs and/or tests ordered include:  Study labs. .      4.) Health maintenance issues addressed today include annual health maintenance and non-gynecologic issues with PCP.    5.)        Hypomagnesemia: continue to take supplement and monitor.     HAILE Garcia, NP-BC, ANP-BC  Women's Health Nurse Practitioner  Adult Nurse Pracitioner  Gynecologic Oncology          CC  Patient Care Team:  Aubrie Almanza as PCP - General  Nga Yeung MD as MD (Oncology)  Patricia Rocha APRN CNP as Nurse Practitioner (Nurse Practitioner)  Breonna Alvarado RN as Registered Nurse  AUBRIE ALMANZA

## 2017-07-13 ENCOUNTER — ONCOLOGY VISIT (OUTPATIENT)
Dept: ONCOLOGY | Facility: CLINIC | Age: 57
End: 2017-07-13
Attending: NURSE PRACTITIONER
Payer: COMMERCIAL

## 2017-07-13 ENCOUNTER — RESEARCH ENCOUNTER (OUTPATIENT)
Dept: ONCOLOGY | Facility: CLINIC | Age: 57
End: 2017-07-13

## 2017-07-13 ENCOUNTER — APPOINTMENT (OUTPATIENT)
Dept: LAB | Facility: CLINIC | Age: 57
End: 2017-07-13
Attending: NURSE PRACTITIONER
Payer: COMMERCIAL

## 2017-07-13 VITALS
BODY MASS INDEX: 22.78 KG/M2 | OXYGEN SATURATION: 100 % | HEART RATE: 110 BPM | WEIGHT: 136.7 LBS | DIASTOLIC BLOOD PRESSURE: 69 MMHG | RESPIRATION RATE: 16 BRPM | HEIGHT: 65 IN | SYSTOLIC BLOOD PRESSURE: 123 MMHG | TEMPERATURE: 97.8 F

## 2017-07-13 DIAGNOSIS — D70.2 DRUG-INDUCED NEUTROPENIA (H): ICD-10-CM

## 2017-07-13 DIAGNOSIS — C56.2 OVARIAN CANCER, LEFT (H): ICD-10-CM

## 2017-07-13 DIAGNOSIS — E83.42 HYPOMAGNESEMIA: ICD-10-CM

## 2017-07-13 DIAGNOSIS — C56.1 OVARIAN CANCER, RIGHT (H): Primary | ICD-10-CM

## 2017-07-13 LAB
ALBUMIN SERPL-MCNC: 3.5 G/DL (ref 3.4–5)
ALBUMIN UR-MCNC: NEGATIVE MG/DL
ALP SERPL-CCNC: 110 U/L (ref 40–150)
ALT SERPL W P-5'-P-CCNC: 25 U/L (ref 0–50)
AMYLASE SERPL-CCNC: 27 U/L (ref 30–110)
ANION GAP SERPL CALCULATED.3IONS-SCNC: 9 MMOL/L (ref 3–14)
APPEARANCE UR: CLEAR
AST SERPL W P-5'-P-CCNC: 20 U/L (ref 0–45)
BASOPHILS # BLD AUTO: 0 10E9/L (ref 0–0.2)
BASOPHILS NFR BLD AUTO: 0.7 %
BILIRUB SERPL-MCNC: 0.4 MG/DL (ref 0.2–1.3)
BILIRUB UR QL STRIP: NEGATIVE
BUN SERPL-MCNC: 20 MG/DL (ref 7–30)
CALCIUM SERPL-MCNC: 8.7 MG/DL (ref 8.5–10.1)
CANCER AG125 SERPL-ACNC: 171 U/ML (ref 0–30)
CHLORIDE SERPL-SCNC: 105 MMOL/L (ref 94–109)
CO2 SERPL-SCNC: 24 MMOL/L (ref 20–32)
COLOR UR AUTO: YELLOW
CREAT SERPL-MCNC: 0.73 MG/DL (ref 0.52–1.04)
DIFFERENTIAL METHOD BLD: ABNORMAL
EOSINOPHIL # BLD AUTO: 0.1 10E9/L (ref 0–0.7)
EOSINOPHIL NFR BLD AUTO: 2.2 %
ERYTHROCYTE [DISTWIDTH] IN BLOOD BY AUTOMATED COUNT: 12.8 % (ref 10–15)
GFR SERPL CREATININE-BSD FRML MDRD: 82 ML/MIN/1.7M2
GGT SERPL-CCNC: 23 U/L (ref 0–40)
GLUCOSE SERPL-MCNC: 96 MG/DL (ref 70–99)
GLUCOSE UR STRIP-MCNC: NEGATIVE MG/DL
HCT VFR BLD AUTO: 37.8 % (ref 35–47)
HGB BLD-MCNC: 12.7 G/DL (ref 11.7–15.7)
HGB UR QL STRIP: NEGATIVE
IMM GRANULOCYTES # BLD: 0 10E9/L (ref 0–0.4)
IMM GRANULOCYTES NFR BLD: 0.5 %
KETONES UR STRIP-MCNC: NEGATIVE MG/DL
LDH SERPL L TO P-CCNC: 140 U/L (ref 81–234)
LEUKOCYTE ESTERASE UR QL STRIP: NEGATIVE
LYMPHOCYTES # BLD AUTO: 1.9 10E9/L (ref 0.8–5.3)
LYMPHOCYTES NFR BLD AUTO: 32.4 %
MAGNESIUM SERPL-MCNC: 1.5 MG/DL (ref 1.6–2.3)
MCH RBC QN AUTO: 35.1 PG (ref 26.5–33)
MCHC RBC AUTO-ENTMCNC: 33.6 G/DL (ref 31.5–36.5)
MCV RBC AUTO: 104 FL (ref 78–100)
MONOCYTES # BLD AUTO: 0.6 10E9/L (ref 0–1.3)
MONOCYTES NFR BLD AUTO: 10.5 %
MUCOUS THREADS #/AREA URNS LPF: PRESENT /LPF
NEUTROPHILS # BLD AUTO: 3.2 10E9/L (ref 1.6–8.3)
NEUTROPHILS NFR BLD AUTO: 53.7 %
NITRATE UR QL: NEGATIVE
NRBC # BLD AUTO: 0 10*3/UL
NRBC BLD AUTO-RTO: 0 /100
PH UR STRIP: 5 PH (ref 5–7)
PLATELET # BLD AUTO: 252 10E9/L (ref 150–450)
POTASSIUM SERPL-SCNC: 3.6 MMOL/L (ref 3.4–5.3)
PROT SERPL-MCNC: 6.9 G/DL (ref 6.8–8.8)
RBC # BLD AUTO: 3.62 10E12/L (ref 3.8–5.2)
RBC #/AREA URNS AUTO: 1 /HPF (ref 0–2)
SODIUM SERPL-SCNC: 138 MMOL/L (ref 133–144)
SP GR UR STRIP: 1.01 (ref 1–1.03)
SQUAMOUS #/AREA URNS AUTO: <1 /HPF (ref 0–1)
URN SPEC COLLECT METH UR: ABNORMAL
UROBILINOGEN UR STRIP-MCNC: 0 MG/DL (ref 0–2)
WBC # BLD AUTO: 5.9 10E9/L (ref 4–11)
WBC #/AREA URNS AUTO: <1 /HPF (ref 0–2)

## 2017-07-13 PROCEDURE — 36591 DRAW BLOOD OFF VENOUS DEVICE: CPT

## 2017-07-13 PROCEDURE — 86304 IMMUNOASSAY TUMOR CA 125: CPT | Performed by: NURSE PRACTITIONER

## 2017-07-13 PROCEDURE — 82150 ASSAY OF AMYLASE: CPT | Performed by: NURSE PRACTITIONER

## 2017-07-13 PROCEDURE — 36415 COLL VENOUS BLD VENIPUNCTURE: CPT | Performed by: NURSE PRACTITIONER

## 2017-07-13 PROCEDURE — 82977 ASSAY OF GGT: CPT | Performed by: NURSE PRACTITIONER

## 2017-07-13 PROCEDURE — 25000128 H RX IP 250 OP 636: Mod: ZF | Performed by: NURSE PRACTITIONER

## 2017-07-13 PROCEDURE — 83615 LACTATE (LD) (LDH) ENZYME: CPT | Performed by: NURSE PRACTITIONER

## 2017-07-13 PROCEDURE — 81001 URINALYSIS AUTO W/SCOPE: CPT | Performed by: NURSE PRACTITIONER

## 2017-07-13 PROCEDURE — 83735 ASSAY OF MAGNESIUM: CPT | Performed by: NURSE PRACTITIONER

## 2017-07-13 PROCEDURE — 80053 COMPREHEN METABOLIC PANEL: CPT | Performed by: NURSE PRACTITIONER

## 2017-07-13 PROCEDURE — 99214 OFFICE O/P EST MOD 30 MIN: CPT | Mod: ZP | Performed by: NURSE PRACTITIONER

## 2017-07-13 PROCEDURE — 85025 COMPLETE CBC W/AUTO DIFF WBC: CPT | Performed by: NURSE PRACTITIONER

## 2017-07-13 PROCEDURE — 99212 OFFICE O/P EST SF 10 MIN: CPT | Mod: ZF

## 2017-07-13 RX ORDER — HEPARIN SODIUM (PORCINE) LOCK FLUSH IV SOLN 100 UNIT/ML 100 UNIT/ML
5 SOLUTION INTRAVENOUS ONCE
Status: COMPLETED | OUTPATIENT
Start: 2017-07-13 | End: 2017-07-13

## 2017-07-13 RX ADMIN — SODIUM CHLORIDE, PRESERVATIVE FREE 5 ML: 5 INJECTION INTRAVENOUS at 08:46

## 2017-07-13 ASSESSMENT — ENCOUNTER SYMPTOMS
POOR WOUND HEALING: 0
NIGHT SWEATS: 0
SLEEP DISTURBANCES DUE TO BREATHING: 0
BACK PAIN: 0
PARALYSIS: 0
BLOATING: 0
HYPOTENSION: 0
DISTURBANCES IN COORDINATION: 0
PALPITATIONS: 0
MUSCLE CRAMPS: 0
INSOMNIA: 0
DECREASED APPETITE: 0
HOARSE VOICE: 0
LEG PAIN: 0
EXTREMITY NUMBNESS: 0
LOSS OF CONSCIOUSNESS: 0
RECTAL PAIN: 0
NAUSEA: 0
LEG SWELLING: 0
DECREASED LIBIDO: 0
SMELL DISTURBANCE: 0
BLOOD IN STOOL: 0
MUSCLE WEAKNESS: 0
PANIC: 0
BOWEL INCONTINENCE: 0
RECTAL BLEEDING: 0
EYE PAIN: 0
EYE IRRITATION: 0
HEADACHES: 0
SINUS PAIN: 0
NECK PAIN: 0
VOMITING: 0
EXERCISE INTOLERANCE: 0
DIZZINESS: 0
JOINT SWELLING: 0
DIARRHEA: 1
NECK MASS: 0
SPEECH CHANGE: 0
ARTHRALGIAS: 0
HYPERTENSION: 0
TACHYCARDIA: 0
EYE REDNESS: 0
NAIL CHANGES: 0
SEIZURES: 0
HEMOPTYSIS: 0
WHEEZING: 0
MEMORY LOSS: 0
POSTURAL DYSPNEA: 0
SNORES LOUDLY: 0
SHORTNESS OF BREATH: 0
SYNCOPE: 0
FLANK PAIN: 0
ALTERED TEMPERATURE REGULATION: 0
HALLUCINATIONS: 0
INCREASED ENERGY: 0
SWOLLEN GLANDS: 0
TREMORS: 0
EYE WATERING: 0
STIFFNESS: 0
LIGHT-HEADEDNESS: 0
DEPRESSION: 0
DIFFICULTY URINATING: 0
WEIGHT GAIN: 0
CLAUDICATION: 0
RESPIRATORY PAIN: 0
SORE THROAT: 0
HOT FLASHES: 0
POLYPHAGIA: 0
ABDOMINAL PAIN: 0
HEMATURIA: 0
SKIN CHANGES: 0
JAUNDICE: 0
WEAKNESS: 0
BRUISES/BLEEDS EASILY: 0
COUGH DISTURBING SLEEP: 0
COUGH: 0
DOUBLE VISION: 0
POLYDIPSIA: 0
BREAST MASS: 0
WEIGHT LOSS: 0
TINGLING: 0
HEARTBURN: 0
NERVOUS/ANXIOUS: 0
NUMBNESS: 0
DECREASED CONCENTRATION: 0
DYSURIA: 0
MYALGIAS: 0
TASTE DISTURBANCE: 0
NECK MASS: 1
TROUBLE SWALLOWING: 0
BREAST PAIN: 0
SINUS CONGESTION: 0
SPUTUM PRODUCTION: 0
CONSTIPATION: 0
CHILLS: 0
ORTHOPNEA: 0
DYSPNEA ON EXERTION: 0
FATIGUE: 1
FEVER: 0

## 2017-07-13 ASSESSMENT — PAIN SCALES - GENERAL: PAINLEVEL: NO PAIN (0)

## 2017-07-13 NOTE — NURSING NOTE
Patient here for C15D1 visit for the Tesaro/Quadra study.  Patient's labs were reviewed and ok to continue with current dose of study medication.  Patient denies any new issues at this time.  Patient returned 37 tabs of Niraparib from bottle number [id number] along with study diary.  Pills taken match pills returned.  Patient was dispensed new medication with bottle number [id number] along with pill diary.  Patient's next appointments were made per protocol.  Patient was instructed to call with any questions or concerns.  Patient voiced understanding.      Patient was re-consented for this study.  Patient was instructed of the changes and had ample time to have all questions answered.      Protocol GR--C [id number]   IRB #[id number]  NCT # [id number]  PI Dr. Anjana Jones, RN     Pager [phone]

## 2017-07-13 NOTE — MR AVS SNAPSHOT
After Visit Summary   7/13/2017    Odalys Nathan    MRN: [id number]           Patient Information     Date Of Birth          1960        Visit Information        Provider Department      7/13/2017 9:00 AM Terrie Porras APRN [facility]        Today's Diagnoses     Ovarian cancer, right (H)    -  1    Ovarian cancer, left (H)        Drug-induced neutropenia (H)           Follow-ups after your visit        Your next 10 appointments already scheduled     Aug 09, 2017  8:00 AM CDT   (Arrive by 7:45 AM)   CT CHEST/ABDOMEN/PELVIS W CONTRAST with UCCT1   [facility] CT ([facility])    [address]  [facility] [address] [phone]           Please bring any scans or X-rays taken at other hospitals, if similar tests were done. Also bring a list of your medicines, including vitamins, minerals and over-the-counter drugs. It is safest to leave personal items at home.  Be sure to tell your doctor:   If you have any allergies.   If there s any chance you are pregnant.   If you are breastfeeding.   If you have any special needs.  You may have contrast for this exam. To prepare:   Do not eat or drink for 2 hours before your exam. If you need to take medicine, you may take it with small sips of water. (We may ask you to take liquid medicine as well.)   The day before your exam, drink extra fluids at least six 8-ounce glasses (unless your doctor tells you to restrict your fluids).  Patients over 70 or patients with diabetes or kidney problems:   If you haven t had a blood test (creatinine test) within the last 30 days, go to your clinic or Diagnostic Imaging Department for this test.  If you have diabetes:   If your kidney function is normal, continue taking your metformin (Avandamet, Glucophage, Glucovance, Metaglip) on the day of your exam.   If your kidney function is abnormal, wait 48 hours before restarting  this medicine.  You will have oral contrast for this exam:   You will drink the contrast at home. Get this from your clinic or Diagnostic Imaging Department. Please follow the directions given.  Please wear loose clothing, such as a sweat suit or jogging clothes. Avoid snaps, zippers and other metal. We may ask you to undress and put on a hospital gown.  If you have any questions, please call the Imaging Department where you will have your exam.            Aug 10, 2017  7:45 AM CDT   [facility] Lab Draw with  [facility] LAB DRAW   [facility] Lab Draw ([facility])    [address] [place] [id number] [phone]            Aug 10, 2017  8:20 AM CDT   (Arrive by 8:05 AM)   Return Active Treatment with Nga Yueng MD   [facility] ([facility])    [address] [place] [address] [phone]              Future tests that were ordered for you today     Open Future Orders        Priority Expected Expires Ordered    CT Chest/Abdomen/Pelvis w Contrast Routine 8/9/2017 7/13/2018 7/13/2017            Who to contact     If you have questions or need follow up information about today's clinic visit or your schedule please contact [facility] directly at [phone].  Normal or non-critical lab and imaging results will be communicated to you by Perfect Memoryhart, letter or phone within 4 business days after the clinic has received the results. If you do not hear from us within 7 days, please contact the clinic through Perfect Memoryhart or phone. If you have a critical or abnormal lab result, we will notify you by phone as soon as possible.  Submit refill requests through Sign[employer]ne or call your pharmacy and they will forward the refill request to us. Please allow 3 business days for your refill to be completed.          Additional Information About Your Visit        Signadyne Information     [employer]ne  "gives you secure access to your electronic health record. If you see a primary care provider, you can also send messages to your care team and make appointments. If you have questions, please call your primary care clinic.  If you do not have a primary care provider, please call [phone] and they will assist you.        Care EveryWhere ID     This is your Care EveryWhere ID. This could be used by other organizations to access your [place] medical records  [id number]        Your Vitals Were     Pulse Temperature Respirations Height Pulse Oximetry BMI (Body Mass Index)    110 97.8  F (36.6  C) 16 1.651 m (5' 5\") 100% 22.75 kg/m2       Blood Pressure from Last 3 Encounters:   07/13/17 123/69   06/15/17 146/83   05/18/17 144/69    Weight from Last 3 Encounters:   07/13/17 62 kg (136 lb 11.2 oz)   06/15/17 62.4 kg (137 lb 8 oz)   05/18/17 62.8 kg (138 lb 7.2 oz)              We Performed the Following     Amylase          CBC with platelets differential     Comprehensive metabolic panel     GGT     Lactate Dehydrogenase     Magnesium     Routine UA with microscopic          Today's Medication Changes          These changes are accurate as of: 7/13/17  9:44 AM.  If you have any questions, ask your nurse or doctor.               These medicines have changed or have updated prescriptions.        Dose/Directions    cyanocobalamin 1000 MCG/ML injection   Commonly known as:  VITAMIN B12   This may have changed:  when to take this   Used for:  B12 deficiency        Dose:  1 mL   Inject 1 mL (1,000 mcg) into the muscle every 30 days   Quantity:  1 mL   Refills:  11       magnesium oxide 400 MG tablet   Commonly known as:  MAG-OX   This may have changed:  when to take this   Used for:  Ovarian cancer, unspecified laterality (H)        Dose:  400 mg   Take 1 tablet (400 mg) by mouth 2 times daily   Quantity:  90 tablet   Refills:  3       * study - niraparib 100 mg capsule   Commonly known as:  IDS# [id number]   This may " have changed:  Another medication with the same name was added. Make sure you understand how and when to take each.   Used for:  Ovarian cancer, right (H), Ovarian cancer, left (H), Drug-induced neutropenia (H)   Changed by:  Patricia Rocha APRN CNP        Dose:  200 mg   Take 2 capsules (200 mg) by mouth every morning Take at the same time each day, preferably morning. Swallow whole and do NOT chew capsules. Food and water is permissible. First dose will be administered on site.   Quantity:  84 capsule   Refills:  0       * study - niraparib 100 mg capsule   Commonly known as:  IDS# [id number]   This may have changed:  Another medication with the same name was added. Make sure you understand how and when to take each.   Used for:  Ovarian cancer, right (H), Ovarian cancer, left (H)   Changed by:  Patricia Rocha APRN CNP        Dose:  200 mg   Take 2 capsules (200 mg) by mouth every morning Take at the same time each day, preferably morning. Swallow whole and do NOT chew capsules. Food and water is permissible. First dose will be administered on site.   Quantity:  84 capsule   Refills:  0       * study - niraparib 100 mg capsule   Commonly known as:  IDS# [id number]   This may have changed:  Another medication with the same name was added. Make sure you understand how and when to take each.   Used for:  Ovarian cancer, right (H), Ovarian cancer, left (H), Drug-induced neutropenia (H)   Changed by:  Nga Yeung MD        Dose:  200 mg   Take 2 capsules (200 mg) by mouth every morning Take at the same time each day, preferably morning. Swallow whole and do NOT chew capsules. Food and water is permissible. First dose will be administered on site.   Quantity:  84 capsule   Refills:  0       * study - niraparib 100 mg capsule   Commonly known as:  IDS# [id number]   This may have changed:  Another medication with the same name was added. Make sure you understand how and when to take each.   Used for:  Ovarian cancer, right  (H), Ovarian cancer, left (H), Drug-induced neutropenia (H)   Changed by:  Nga Yeung MD        Dose:  200 mg   Take 2 capsules (200 mg) by mouth every morning Take at the same time each day, preferably morning. Swallow whole and do NOT chew capsules. Food and water is permissible. First dose will be administered on site.   Quantity:  84 capsule   Refills:  0       * study - niraparib 100 mg capsule   Commonly known as:  IDS# [id number]   This may have changed:  You were already taking a medication with the same name, and this prescription was added. Make sure you understand how and when to take each.   Used for:  Ovarian cancer, right (H), Ovarian cancer, left (H), Drug-induced neutropenia (H)   Changed by:  Nga Yeung MD        Dose:  200 mg   Take 2 capsules (200 mg) by mouth every morning Take at the same time each day, preferably morning. Swallow whole and do NOT chew capsules. Food and water is permissible. First dose will be administered on site.   Quantity:  84 capsule   Refills:  0       * Notice:  This list has 5 medication(s) that are the same as other medications prescribed for you. Read the directions carefully, and ask your doctor or other care provider to review them with you.         Where to get your medicines      Information about where to get these medications is not yet available     ! Ask your nurse or doctor about these medications     study - niraparib 100 mg capsule                Primary Care Provider Office Phone # Fax #    Aubrie Hester [phone]       [facility] [address] [facility] [address]        Equal Access to Services     [facility] AH: Hadii bj santana hadasho Socait, waaxda luqadaha, qaybta kaalmada adeegyada, blanka szymanski. So [facility] [phone].    ATENCIÓN: Si habla español, tiene a bell disposición servicios gratuitos de asistencia lingüística. Llame al [phone].    We comply with applicable  federal civil rights laws and [place] laws. We do not discriminate on the basis of race, color, national origin, age, disability sex, sexual orientation or gender identity.            Thank you!     Thank you for choosing [facility]  for your care. Our goal is always to provide you with excellent care. Hearing back from our patients is one way we can continue to improve our services. Please take a few minutes to complete the written survey that you may receive in the mail after your visit with us. Thank you!             Your Updated Medication List - Protect others around you: Learn how to safely use, store and throw away your medicines at www.disposemymeds.org.          This list is accurate as of: 7/13/17  9:44 AM.  Always use your most recent med list.                   Brand Name Dispense Instructions for use Diagnosis    ASPIRIN PO      Take 325 mg by mouth daily        CALCIUM + D PO      Take 1 tablet by mouth daily.    Pelvic mass       cyanocobalamin 1000 MCG/ML injection    VITAMIN B12    1 mL    Inject 1 mL (1,000 mcg) into the muscle every 30 days    B12 deficiency       diphenoxylate-atropine 2.5-0.025 MG per tablet    LOMOTIL    60 tablet    Take 2 tablets by mouth 4 times daily as needed for diarrhea    Acute diarrhea       ferrous sulfate 325 (65 FE) MG tablet    IRON    90 tablet    Take 1 tablet (325 mg) by mouth 3 times daily (with meals) Take with small amount of orange juice, do not take with calcium    Ovarian cancer, right (H), Anemia, unspecified type, Ovarian cancer, left (H)       HERBALS      daily        LEVOTHYROXINE SODIUM PO      Take by mouth daily        LORazepam 1 MG tablet    ATIVAN    30 tablet    Take 1 tablet (1 mg) by mouth every 6 hours as needed (Anxiety, Nausea/Vomiting or Sleep)    Ovarian cancer, unspecified laterality (H), Drug induced neutropenia(288.03)       magnesium oxide 400 MG tablet    MAG-OX    90 tablet    Take 1 tablet (400 mg) by mouth 2  times daily    Ovarian cancer, unspecified laterality (H)       METFORMIN HCL PO      Take 500 mg by mouth daily        order for DME     3 each    Injection Supplies for Vitamin B12: 3cc syringes w/ 27 gauge needles, 1/2 inch length    B12 deficiency       potassium chloride 20 MEQ Packet    KLOR-CON     Take 20 mEq by mouth daily        * study - niraparib 100 mg capsule    IDS# [id number]    84 capsule    Take 2 capsules (200 mg) by mouth every morning Take at the same time each day, preferably morning. Swallow whole and do NOT chew capsules. Food and water is permissible. First dose will be administered on site.    Ovarian cancer, right (H), Ovarian cancer, left (H), Drug-induced neutropenia (H)       * study - niraparib 100 mg capsule    IDS# [id number]    84 capsule    Take 2 capsules (200 mg) by mouth every morning Take at the same time each day, preferably morning. Swallow whole and do NOT chew capsules. Food and water is permissible. First dose will be administered on site.    Ovarian cancer, right (H), Ovarian cancer, left (H)       * study - niraparib 100 mg capsule    IDS# [id number]    84 capsule    Take 2 capsules (200 mg) by mouth every morning Take at the same time each day, preferably morning. Swallow whole and do NOT chew capsules. Food and water is permissible. First dose will be administered on site.    Ovarian cancer, right (H), Ovarian cancer, left (H), Drug-induced neutropenia (H)       * study - niraparib 100 mg capsule    IDS# [id number]    84 capsule    Take 2 capsules (200 mg) by mouth every morning Take at the same time each day, preferably morning. Swallow whole and do NOT chew capsules. Food and water is permissible. First dose will be administered on site.    Ovarian cancer, right (H), Ovarian cancer, left (H), Drug-induced neutropenia (H)       * study - niraparib 100 mg capsule    IDS# [id number]    84 capsule    Take 2 capsules (200 mg) by mouth every morning Take at the same time each day, preferably morning.  Swallow whole and do NOT chew capsules. Food and water is permissible. First dose will be administered on site.    Ovarian cancer, right (H), Ovarian cancer, left (H), Drug-induced neutropenia (H)       VITAMIN C PO      Take 500 mg by mouth daily    Pelvic mass       VITAMIN D PO      Take 1,000 Units by mouth daily Unknown dose        VITAMIN E NATURAL PO      Take 100 Units by mouth daily        * Notice:  This list has 5 medication(s) that are the same as other medications prescribed for you. Read the directions carefully, and ask your doctor or other care provider to review them with you.

## 2017-07-13 NOTE — NURSING NOTE
"Oncology Rooming Note    July 13, 2017 9:09 AM   Odalys Nathan is a 56 year old female who presents for:    Chief Complaint   Patient presents with     Port Draw     VS, labs collected via portacath     Oncology Clinic Visit     return patient visit for lab results/ follow up related to Ovarian cancer, right (H)     Initial Vitals: /69  Pulse 110  Temp 97.8  F (36.6  C)  Resp 16  Ht 1.651 m (5' 5\")  Wt 62 kg (136 lb 11.2 oz)  SpO2 100%  BMI 22.75 kg/m2 Estimated body mass index is 22.75 kg/(m^2) as calculated from the following:    Height as of this encounter: 1.651 m (5' 5\").    Weight as of this encounter: 62 kg (136 lb 11.2 oz). Body surface area is 1.69 meters squared.  No Pain (0) Comment: Data Unavailable   No LMP recorded. Patient has had a hysterectomy.  Allergies reviewed: Yes  Medications reviewed: Yes    Medications: Medication refills not needed today.  Pharmacy name entered into [facility]:    [id number] - [place], [place] - [address] [facility] - [place], [place] - [address] [facility] [address] [facility] [address] [facility]  [facility] - [place], [place] - [address] [facility]    Clinical concerns: no concerns carissa was notified.    5 minutes for nursing intake (face to face time)     Dre Perdomo CMA              "

## 2017-07-13 NOTE — LETTER
2017       [facility]: Odalys Nathan  [address] [address] [facility] [place] [address]     Dear Colleague,    Thank you for referring your patient, Odalys Nathan, to the [facility]. Please see a copy of my visit note below.                Follow Up Notes on Referred Patient    Date: 2017       Dr. Aubrie Hester  [facility]  [address] [place] [address]       RE: Odalys Nathan  : 1960  HOLA: 2017    Dear Dr. Aubrie Hester:    Odalys Nathan is a 56 year old woman with a diagnosis of recurrent stage IIIC bilateral ovarian cancer.   She is currently on the TESARO study. She is here today for follow up and disease management.     Brief Oncology History:  2012 - Admitted to hospital for 2 weeks of intermittent abdominal cramping, distention, diarrhea and N/V. CT of abdomen/pelvis significant for small bowel obstruction, a heterogenous soft tissue density in the pelvis, omental nodules, and ascites. Bilateral adnexal masses per U/S of pelvis. CA-125 was elevated at 987, CEA was normal at 1.0.    12 - Therapeutic paracentesis (4 L) with cytology confirming malignancy (NC positive, weak ER positive, CK-7 positive consistent with GYN primary). Surgery recommended d/t potential for falling blood counts 2/2 chemotherapy (patient is Muslim and limiting blood transfusion)    12 - Exploratory laparotomy, MAR BSO, lysis of adhesion, Appendectomy, Repair cystotomy, Omentectomy Nckx-ihgzeg-gdpmpxahu-transverse-colon resection, Ileo-descending colon anastomosis, CUSA, & Colonoscopy (done by Dr. Amato and colorectal team).  2012 - Admitted to hospital for bilateral pulmonary emboli and drainage of pleural effusion. Started on Lovenox.  12-3/21/12: Cycle #1-2 Carbo/Taxol IV  3/29/12 - Started on Keflex by her PCP for infection in her healing wound (immediately below her umbilicus).    12-12: Cycle #3-6 IV  "chemo, Cycle #1 IV/IP chemo  7/16/12 -  8, CT PRADEEP - enrolled in  - observation arm  3/4/13-6/28/13:  6, 9, 12, 15, 20.  Decision made to start Doxil/Carbo.  7/11/13: The left ventricular ejection fraction is normal at 66.4%.  7/12/13-9/6/13: Cycle #1-3 Doxil/Carbo.  10, 11.    2/20/14: Decision to take break from chemo for two months, followed by CT and CA-125.    4/21/14  27  exploratory laparotomy and removal of mesenteric masses, tumor debulking, peritoneal biopsies and intraperitoneal port placement. On laparoscopy, it was noted that there were small nodules on the anterior abdominal wall near the previous incision, small nodules on the right pelvic sidewall as well. Nodules were palpated in the mesentery; however, as it was unable to clarify where the origin of the nodules was, the decision was made to open the patient. On opening there was found to be approximately a 3 cm nodule in the small bowel mesentery and another separate approximately 2 cm nodule in the bowel mesentery. Pelvis without evidence of cancer, some mesenteric lymph nodes were palpated. No evidence otherwise of any disseminated cancer throughout the abdomen.    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 (2/2)  -Largest metastasis: 1.3 cm  -See comment  D: Peritoneum, right paracolic gutter #2, biopsy:  -Fibroadipose tissue with granulomatous inflammation surrounding refractile material  -Negative for malignancy   E: Small bowel adhesion, biopsy:  -Fibroconnective tissue, consistent with adhesion  -Negative for malignancy  F: Lymph nodes, mesentry, not otherwise " "specified, excision:  -Two lymph nodes, negative for metastatic carcinoma (0/2)  G: Lymph node, mesentery, \"#2\", excision:  -One lymph node, negative for metastatic carcinoma (0/1)  H: Lymph nodes, mesentery, \"nodule #2\", excision:  -Five lymph nodes, negative for metastatic carcinoma (0/5)  COMMENT:  Some of the specimens show post-operative changes. Others show possible treatment related changes, including necrosis. The metastatic carcinoma in the mesenteric lymph nodes (specimen C) shows variable morphology, including relatively low grade tumor with papillary architecture, and high grade tumor comprised of nests of tumor cells with irregular, slit-like spaces and marked nuclear pleomorphism.    5/29/14: Cycle 1 IV PACLitaxel / IP CISplatin / IP PACLitaxel.  - 28.  6/26/14: Cycle #2 IV/IP.  10  8/5/14: CT chest/abd/pelvis IMPRESSION     1. In this patient with ovarian cancer, overall findings are indicative of stable/slight improvement, as multiple mesenteric lymphadenopathy and scattered nodular peritoneal soft tissue mass lesions appear unchanged or slightly smaller since 4/21/2014.    2. 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 Mild dilatation of the second and the third portion of the duodenum with a narrow SMA angle. This could represent SMA syndrome, if clinically correlated.17/14:    Cycle #3 IV/IP.  10.    8/7/14: Cycle #4 Taxol/Carbo (changed from IV/IP).  10. She has been feeling okay. She is unsure if she can finish out the course of 6 cycles IV/IP taxol/cisplatin. She feels like she has the flu for about a week then starts feeling gradually better after each chemo cycle. Her spouse notes that she actually has been more sick with the treatments than she initially admits here. She was also previously having some rib pain. Denies any rib pain now. Denies any chest pain or shortness of breath.  8/20/14: Remove Intraperitoneal Port ( Port and catheter intact - " discarded)  8/28/14: Cycle #5 Taxol/Carbo held due to thrombocytopenia.  6.    9/29/14: Cycle #6 Taxol/Carbo  6. Insurance questions regarding GSF coverage today. No concerns other than fatigue. Taking iron for anemia and does not desire blood transfusion. Using neulasta for neutropenia. Using home IV hydration if needed. Baseline unchanged. No abdominal bloating, constipation, diarrhea, pain, vaginal or rectal bleeding, cough or dyspnea, fluid retention.      10/20/14:  5. CT chest/abdomen/pelvis on 10/16/14 showed nodular peritoneal soft tissue mass in the right lower quadrant adjacent to the psoas muscle is no longer appreciated. Adjacent prominent lymphadenopathy is unchanged from previous exam. 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  1/27/15:  6.  4/28/15:  14.  5/26/15:  18.     6/2/15: CT cap Impression:  1. Postsurgical changes of hysterectomy and bilateral salpingo-oophorectomy for ovarian cancer. There is a new 8 mm hazy, ill-defined hypoattenuating lesion in hepatic segment 6 which is suspicious for a metastatic deposit. Further evaluation with ultrasound in recommended.    2. Increased size of a left retroperitoneal lymph node which is indeterminate but may represent a ciro metastasis. Mildly prominent lymph nodes in the right lower quadrant are not significantly changed.  3. Moderate colonic stool burden.    6/4/15: US abdomen IMPRESSION:    Hyperechoic lesion in the right hepatic lobe, consistent with hemangioma. This does not corresponds to the area of the lesion seen on CT from 6/2/2015. An MR would be helpful for identifying and characterizing the lesion from the recent CT.  6/12/15: MR abd IMPRESSION:  1. New 20 x 11 mm enhancing lesions between the right obliques, concerning for metastatic disease. This lesions should be amenable to percutaneous biopsy, if indicated.  2. Correlating to the lesion visualized on  comparison CT is a hepatic segment 6 subcapsular 7 mm lesion. Overall the appearance favors the diagnosis of a simple cyst. However, there is faint suggestion of mild peripheral arterial enhancement. Although this is favored as  artifactual, this should be followed up to confirm stability. Recommend 6 month followup.  3. Hepatic segment 6, 5 mm lesion too small to technically characterize. Differential would favor FNH, less likely flash filling hemangioma. Recommend attention on followup.     9/1/15-10/15/15: Cycle #1-3 Avastin/Cytoxan.   31, 19, 16.     11/6/15: CT c/a/p IMPRESSION:    1. Stable postoperative change of MAR/BSO for ovarian cancer.  2. The lesion in the right flank abdominal musculature is slightly decreased in size. Otherwise, stable examination.  2. No evidence of metastatic disease in the chest.     11/20/15:  16     11/25/15: surgical pathology report  FINAL DIAGNOSIS:  Soft tissue, right oblique muscle mass, excision:  -Recurrent ovarian serous carcinoma  -Carcinoma is present less than 1 mm from one resection margin  -Background skeletal muscle and fibroadipose tissue     1/4/16:  22  1/11/16-1/27/16: Radiation to right flank x 12 treatments     4/7/2016:  94. CT cap IMPRESSION:    In this patient with ovarian cancer status post MAR/BSO and descending/transverse colectomy:  1. No evidence for malignancy in the chest, abdomen, or pelvis.  2. Stable small hypodense segment 6 liver lesion, appears more likely benign, possibly a cyst.     5/13/16:  124.     6/3/16: PET CT IMPRESSION: In this patient with a history of ovarian cancer:  1. Hypermetabolic and enlarging periaortic and perihepatic lymphadenopathy compatible with metastatic disease, as detailed above.  2. Although hypodense lesion in hepatic segment 6 has been present since 6/2/2015 associated hypermetabolism makes this lesion highly concerning for metastatic disease.     6/9/16:  137.  6/14/16: Cycle  #1 Niraparib.    6/28/16: Cycle #1 D15 Niraparib.    7/5/16:  100.    7/11/16: Cycle #2 Niraparib.   83.     8/3/2016: PET CT IMPRESSION:    In this patient with known history of ovarian cancer:  1) New pleural based nodular opacities in the lateral and inferior aspects of the bilateral lower lobes, worse in the left lung. Likely infection. Close follow up is recommended.      2) Slight decrease in hypermetabolic abdominal lymphadenopathy. 2 hypermetabolic lymph nodes persist.  3) Unchanged right hepatic lobe metastatic lesion.      8/9/16: Cycle #3 Niraparib.  69.  9/6/16: Cycle #4 Niraparib.  53. Dose held due to anemia.  9/13/16: Eval for potential cycle 4 niraparib. Dose held due to anemia.     10/4/16: CT CAP impression:   In this patient with a known history of ovarian cancer:  1. There has been interval resolution of pleural-based nodular opacities which likely represented infection.  2. Abdominal lymphadenopathy in the form of 2 portacaval lymph nodes have not significantly changed in size, noted to be hypermetabolic on prior PET/CT.  3. Previously demonstrated metastatic lesion in the right lobe of the liver is not significantly changed.  10/11/16:  60  11/1/16: Cycle #6 Niraparib,  75  11/29/16: Cycle #7 Niraparib.  78. CT CAP impression as follows:  Target lesions (RECIST criteria):    A previously described target lesion superior to the head of the pancreas (series 2, image 64)  (referred to as a perihepatic node on  6/3/2016) may not be a valid target lesion because it measured less than 1.5 cm originally. However, this particular node has decreased in size, now measuring 7 mm in short axis versus 14 mm on 6/3/2016 when measured in a similar fashion.    2.3 cm short axis portacaval lymph node on series 2 image 67, previously 2.0 cm on 10/4/2016.  1.2 cm subtle hypodensity in hepatic segment 6 on series 2 image 75,stable on multiple studies since at least  6/3/2016 Sum of diameters today: 3.5 cm. Sum of diameters 10/4/2016: 3.2 cm. Growth = 9%.       12/27/16: Cycle #8 Niraparib.  105.  1/25/17: Cycle #9 Niraparib.  108     2/20/17: CT CAP Impression IMPRESSION:   1. In this patient with history of ovarian cancer there is stable  disease by RECIST criteria as evidenced by:                           1a. Mildly increased size of liver metastasis.                          1b. Stable portacaval lymphadenopathy.                          1c. No evidence of metastatic disease in the chest.  2. Trace emphysematous changes of the lungs.     2/23/17:  pending, Cycle #10 Niraparib  CT CAP impression:  Sum of target lesion diameters today: 3.7 cm. Sum of target lesion diameters on 11/28/2016: 3.5 cm. Growth= 6%  1. In this patient with history of ovarian cancer there is stable disease by RECIST criteria as evidenced by:   1a. Mildly increased size of liver metastasis.  1b. Stable portacaval lymphadenopathy.  1c. No evidence of metastatic disease in the chest.  2. Trace emphysematous changes of the lungs.  3/22/17: Cycle #11 Niraparib.  132.  4/19/17: Cycle #12 Niraparib.  127.     5/16/17: CT CAP IMPRESSION: In this patient with ovarian cancer:  1. Mildly increased size of hepatic metastasis segment 6 with subtle increased capsular retraction.  2. Stable edmundo hepatis nodes, with mild increase in size of periaortic lymph nodes.  3. Prominent left supraclavicular lymph node with subtle increase in size compared to prior studies, particularly comparing to 10/4/2016.  4. Mild subtle groundglass opacities in the right upper lobe, not present on prior study, lesser extent in the right lower lobe.  Findings may represent infection, additional consideration is malignancy (less likely), and attention on follow-up study  Recommended.  Addendum:   Prominent left supraclavicular lymph node (3/25) is stable from most recent CT performed 2/20/2017, currently  measuring 14 x 16 mm, previously 14 x 16 mm on 2/20/2017.      The portal caval lymph node/edmundo hepatis lymph node is stable from 2/20/2017, measuring 21 mm.      Clarification of size of the para-aortic lymph node (series 3 image 327). It short axis measurement is 11 mm versus 9 mm on prior study.      Hepatic segment 6 triangular-shaped low density lesion (3/362) measures 14 mm, previously measured 12 mm, demonstrating a  possible/questionable minimal subtle increase in size. Similarly the lymph nodes noted above in the supraclavicular and para-aortic regions demonstrate possible minimal possible subtle increase in size.      5/18/17: Cycle #13  Niraparib.  191.  6/15/17: Cycle #14  Niraparib.  146.  7/13/17: Cycle #15 Niraparib 200 mg.  pending.         Today she comes to clinic feeling generally well and denies any new issues/concerns. She denies any vaginal bleeding, no changes in her bowel (has liquid stools which is not new) or bladder habits, no nausea/emesis, no lower extremity edema, and no difficulties eating or sleeping. She denies any abdominal discomfort/bloating, no fevers or chills, and no chest pain or shortness of breath. She is taking her Niraparib and is not needing to take any antiemetic beforehand. She is still able to do all of her same activities; she does have some fatigue but this is the same as previous. She does not need any medication refills.         Review of Systems     Constitutional:  Positive for fatigue. Negative for fever, chills, weight loss, weight gain, decreased appetite, night sweats, recent stressors, height gain, height loss, post-operative complications, incisional pain, hallucinations, increased energy, hyperactivity and confused.   HENT:  Positive for tinnitus. Negative for ear pain, hearing loss, nosebleeds, trouble swallowing, hoarse voice, mouth sores, sore throat, ear discharge, tooth pain, gum tenderness, taste disturbance, smell disturbance,  hearing aid, bleeding gums, dry mouth, sinus pain, sinus congestion and neck mass.    Eyes:  Negative for double vision, pain, redness, eye pain, decreased vision, eye watering, eye bulging, eye dryness, flashing lights, spots, floaters, strabismus, tunnel vision, jaundice and eye irritation.   Respiratory:   Negative for cough, hemoptysis, sputum production, shortness of breath, wheezing, sleep disturbances due to breathing, snores loudly, respiratory pain, dyspnea on exertion, cough disturbing sleep and postural dyspnea.    Cardiovascular:  Negative for chest pain, dyspnea on exertion, palpitations, orthopnea, claudication, leg swelling, fingers/toes turn blue, hypertension, hypotension, syncope, history of heart murmur, chest pain on exertion, chest pain at rest, pacemaker, few scattered varicosities, leg pain, sleep disturbances due to breathing, tachycardia, light-headedness, exercise intolerance and edema.   Gastrointestinal:  Positive for diarrhea. Negative for heartburn, nausea, vomiting, abdominal pain, constipation, blood in stool, melena, rectal pain, bloating, hemorrhoids, bowel incontinence, jaundice, rectal bleeding, coffee ground emesis and change in stool.   Genitourinary:  Negative for bladder incontinence, dysuria, urgency, hematuria, flank pain, vaginal discharge, difficulty urinating, genital sores, dyspareunia, decreased libido, nocturia, voiding less frequently, arousal difficulty, abnormal vaginal bleeding, excessive menstruation, menstrual changes, hot flashes, vaginal dryness and postmenopausal bleeding.   Musculoskeletal:  Negative for myalgias, back pain, joint swelling, arthralgias, stiffness, muscle cramps, neck pain, bone pain, muscle weakness and fracture.   Skin:  Negative for nail changes, itching, poor wound healing, rash, hair changes, skin changes, acne, warts, poor wound healing, scarring, flaky skin, Raynaud's phenomenon, sensitivity to sunlight and skin thickening.    Neurological:  Negative for dizziness, tingling, tremors, speech change, seizures, loss of consciousness, weakness, light-headedness, numbness, headaches, disturbances in coordination, extremity numbness, memory loss, difficulty walking and paralysis.   Endo/Heme:  Negative for anemia, swollen glands and bruises/bleeds easily.   Psychiatric/Behavioral:  Negative for depression, hallucinations, memory loss, decreased concentration, mood swings and panic attacks.    Breast:  Negative for breast discharge, breast mass, breast pain and nipple retraction.   Endocrine:  Negative for altered temperature regulation, polyphagia, polydipsia, unwanted hair growth and change in facial hair.        Past Medical History:    Past Medical History:   Diagnosis Date     Antiplatelet or antithrombotic long-term use      Ascites      Blood clot in the legs      Diabetes (H)      Ovarian cancer (H)     serous,stg IV     Pleural effusion      Pulmonary embolism (H) 2/2012     Refusal of blood transfusions as patient is [employer]ist      Short gut syndrome      Subclinical hypothyroidism 4/18/2013     Thrombosis of leg          Past Surgical History:    Past Surgical History:   Procedure Laterality Date     COLECTOMY       COLONOSCOPY  2/1/2012    Procedure:COLONOSCOPY; With Biopsy; Surgeon:TONI SULLIVAN; Location:[facility]     HYSTERECTOMY TOTAL ABD, RHIANNA SALPINGO-OOPHORECTOMY, NODE DISSECTION, TUMOR DEBULKING, COMBINED  2/1/2012    Procedure:COMBINED HYSTERECTOMY TOTAL ABDOMINAL, BILATERAL SALPINGO-OOPHORECTOMY, NODE DISSECTION, TUMOR DEBULKING;  Exploratory Laparotomy, Total Abdominal Hysterectomy, Bilateral Salpingo-Oophorectomy, appendectomy,lysis of adhesions, ileal, ascending, transverse and splenic flexure resection, ileal descending bowel renanastomosis, incidental cystotomy repair, CUSA procedure and colonoscopy ; [facility]     INSERT PORT PERITONEAL ACCESS  4/3/2012    Procedure:INSERT PORT PERITONEAL ACCESS; Intraperitoneal  Port Placement (c-arm); Surgeon:SAMUEL CARRASCO; Location:[facility]     INSERT PORT PERITONEAL ACCESS  5/14/2014    Procedure: INSERT PORT PERITONEAL ACCESS;  Surgeon: Nga Yeung MD;  Location: [facility] OR     INSERT PORT VASCULAR ACCESS       LAPAROSCOPY DIAGNOSTIC (GYN)  5/14/2014    Procedure: LAPAROSCOPY DIAGNOSTIC (GYN);  Surgeon: Nga Yeung MD;  Location: [facility] OR     LAPAROTOMY EXPLORATORY Right 11/25/2015    Procedure: LAPAROTOMY EXPLORATORY;  Surgeon: Nga Yeung MD;  Location: [facility]     LAPAROTOMY, TUMOR DEBULKING, COMBINED  5/14/2014    Procedure: COMBINED LAPAROTOMY, TUMOR DEBULKING;  Surgeon: Nga Yeung MD;  Location: [facility] OR     REMOVE CATHETER PERITONEAL N/A 8/20/2014    Procedure: REMOVE CATHETER PERITONEAL;  Surgeon: Nga Yeung MD;  Location: [facility] OR     VASCULAR SURGERY      stent left iliac vein         Health Maintenance Due   Topic Date Due     TETANUS IMMUNIZATION (SYSTEM ASSIGNED)  08/03/1976     ADVANCE DIRECTIVE PLANNING Q5 YRS  08/03/1976     LIPID SCREEN Q5 YR FEMALE (SYSTEM ASSIGNED)  08/03/2003     MAMMO SCREEN Q2 YR (SYSTEM ASSIGNED)  02/01/2015     PAP SCREENING Q3 YR (SYSTEM ASSIGNED)  04/04/2016       Current Medications:     Current Outpatient Prescriptions   Medication Sig Dispense Refill     study - niraparib (IDS# [id number]) 100 mg capsule Take 2 capsules (200 mg) by mouth every morning Take at the same time each day, preferably morning. Swallow whole and do NOT chew capsules. Food and water is permissible. First dose will be administered on site. 84 capsule 0     ferrous sulfate (IRON) 325 (65 FE) MG tablet Take 1 tablet (325 mg) by mouth 3 times daily (with meals) Take with small amount of orange juice, do not take with calcium 90 tablet 3     study - niraparib (IDS# [id number]) 100 mg capsule Take 2 capsules (200 mg) by mouth every morning Take at the same time each day, preferably morning. Swallow whole and do NOT chew capsules. Food and water is  permissible. First dose will be administered on site. 84 capsule 0     study - niraparib (IDS# [id number]) 100 mg capsule Take 2 capsules (200 mg) by mouth every morning Take at the same time each day, preferably morning. Swallow whole and do NOT chew capsules. Food and water is permissible. First dose will be administered on site. 84 capsule 0     study - niraparib (IDS# [id number]) 100 mg capsule Take 2 capsules (200 mg) by mouth every morning Take at the same time each day, preferably morning. Swallow whole and do NOT chew capsules. Food and water is permissible. First dose will be administered on site. 84 capsule 0     LEVOTHYROXINE SODIUM PO Take by mouth daily        order for DME Injection Supplies for Vitamin B12: 3cc syringes w/ 27 gauge needles, 1/2 inch length 3 each 0     LORazepam (ATIVAN) 1 MG tablet Take 1 tablet (1 mg) by mouth every 6 hours as needed (Anxiety, Nausea/Vomiting or Sleep) 30 tablet 2     METFORMIN HCL PO Take 500 mg by mouth daily       diphenoxylate-atropine (LOMOTIL) 2.5-0.025 MG per tablet Take 2 tablets by mouth 4 times daily as needed for diarrhea 60 tablet 0     cyanocobalamin (VITAMIN B12) 1000 MCG/ML injection Inject 1 mL (1,000 mcg) into the muscle every 30 days (Patient taking differently: Inject 1 mL into the muscle once a week ) 1 mL 11     magnesium oxide (MAG-OX) 400 MG tablet Take 1 tablet (400 mg) by mouth 2 times daily (Patient taking differently: Take 400 mg by mouth daily ) 90 tablet 3     ASPIRIN PO Take 325 mg by mouth daily       potassium chloride (KLOR-CON) 20 MEQ packet Take 20 mEq by mouth daily       VITAMIN E NATURAL PO Take 100 Units by mouth daily       Cholecalciferol (VITAMIN D PO) Take 1,000 Units by mouth daily Unknown dose       HERBALS daily        Ascorbic Acid (VITAMIN C PO) Take 500 mg by mouth daily        Calcium Carbonate-Vitamin D (CALCIUM + D PO) Take 1 tablet by mouth daily.           Allergies:        Allergies   Allergen Reactions     Nkda [No Known  "Drug Allergies]         Social History:     Social History   Substance Use Topics     Smoking status: Former Smoker     Years: 8.00     Types: Cigarettes     Quit date: 6/21/1980     Smokeless tobacco: Never Used      Comment: started at 9 yo and quit at 16 yo     Alcohol use Yes      Comment: 3x/day wine or jodi       History   Drug Use No         Family History:     The patient's family history is notable for:    Family History   Problem Relation Age of Onset     CANCER Mother 69     lung, smoker     CANCER Maternal Uncle 65     brain     Colon Cancer Maternal Aunt 80     colon         Physical Exam:     /69  Pulse 110  Temp 97.8  F (36.6  C)  Resp 16  Ht 1.651 m (5' 5\")  Wt 62 kg (136 lb 11.2 oz)  SpO2 100%  BMI 22.75 kg/m2  Body mass index is 22.75 kg/(m^2).    General Appearance: healthy and alert, no distress     HEENT: no thyromegaly, no palpable nodules or masses        Cardiovascular: regular rate and rhythm, no gallops, rubs or murmurs     Respiratory: lungs clear, no rales, rhonchi or wheezes, normal diaphragmatic excursion    Musculoskeletal: extremities non tender and without edema    Skin: no lesions or rashes     Neurological: normal gait, no gross defects     Psychiatric: appropriate mood and affect                               Hematological: normal cervical, supraclavicular lymph nodes     Gastrointestinal:       abdomen soft, non-tender, non-distended, no organomegaly or masses    Genitourinary: Not indicated      Assessment:    Odalys Nathan is a 56 year old woman with a diagnosis of recurrent stage IIIC bilateral ovarian cancer.   She is currently on the TESARO study. She is here today for follow up and disease management.     25 minutes were spent with this patient, over 50% of that time was spent in symptom management, treatment planning and in counseling and coordination of care.      Plan:     1.)        Ok to continue with Niraparib 200 mg/day. She will have imaging " and then see Dr. Yeung to discuss results. Reviewed signs and symptoms for when she should contact the clinic or seek additional care. Patient to contact the clinic with any questions or concerns in the interim.    ECOG = 0.     2.) Genetic risk factors were assessed and she is negative for mutations in BRCA1, BRCA2, EPCAM, MLH1, MSH2, MSH6, PMS2, PTEN, and TP53 genes    3.) Labs and/or tests ordered include:  Study labs. .      4.) Health maintenance issues addressed today include annual health maintenance and non-gynecologic issues with PCP.    5.)        Hypomagnesemia: continue to take supplement and monitor.     HAILE Garcia, WHNP-BC, ANP-BC  Women's Health Nurse Practitioner  Adult Nurse Pracitioner  Gynecologic Oncology          CC  Patient Care Team:  Aubrie Hester as PCP - General  Nga Yeung MD as MD (Oncology)  Patricia Rocha APRN CNP as Nurse Practitioner (Nurse Practitioner)  Breonna Alvarado RN as Registered Nurse

## 2017-07-14 NOTE — NURSING NOTE
Chief Complaint   Patient presents with     Port Draw     Labs drawn by RN from port. VS taken.      Port accessed by RN with 20g 3/4in Gripper needle. Labs drawn, port flushed with NS and Heparin and de-accessed.  Xochilt Tobin RN     negative...

## 2017-08-08 ASSESSMENT — ENCOUNTER SYMPTOMS
HEARTBURN: 0
RECTAL BLEEDING: 0
BLOOD IN STOOL: 0
SORE THROAT: 0
BLOATING: 0
TROUBLE SWALLOWING: 0
JAUNDICE: 0
TASTE DISTURBANCE: 0
RECTAL PAIN: 0
NAUSEA: 0
ABDOMINAL PAIN: 0
SINUS CONGESTION: 0
NECK MASS: 0
HOARSE VOICE: 0
BOWEL INCONTINENCE: 0
DIARRHEA: 1
CONSTIPATION: 0
VOMITING: 0
SMELL DISTURBANCE: 0
SINUS PAIN: 0

## 2017-08-10 ENCOUNTER — APPOINTMENT (OUTPATIENT)
Dept: LAB | Facility: CLINIC | Age: 57
End: 2017-08-10
Attending: OBSTETRICS & GYNECOLOGY
Payer: COMMERCIAL

## 2017-08-10 ENCOUNTER — RESEARCH ENCOUNTER (OUTPATIENT)
Dept: ONCOLOGY | Facility: CLINIC | Age: 57
End: 2017-08-10

## 2017-08-10 ENCOUNTER — ONCOLOGY VISIT (OUTPATIENT)
Dept: ONCOLOGY | Facility: CLINIC | Age: 57
End: 2017-08-10
Attending: OBSTETRICS & GYNECOLOGY
Payer: COMMERCIAL

## 2017-08-10 ENCOUNTER — CARE COORDINATION (OUTPATIENT)
Dept: ONCOLOGY | Facility: CLINIC | Age: 57
End: 2017-08-10

## 2017-08-10 VITALS
SYSTOLIC BLOOD PRESSURE: 139 MMHG | TEMPERATURE: 98.4 F | RESPIRATION RATE: 16 BRPM | BODY MASS INDEX: 22.97 KG/M2 | HEART RATE: 104 BPM | OXYGEN SATURATION: 98 % | DIASTOLIC BLOOD PRESSURE: 86 MMHG | WEIGHT: 138.01 LBS

## 2017-08-10 DIAGNOSIS — C56.1 OVARIAN CANCER, RIGHT (H): Primary | ICD-10-CM

## 2017-08-10 DIAGNOSIS — D70.2 DRUG-INDUCED NEUTROPENIA (H): ICD-10-CM

## 2017-08-10 DIAGNOSIS — C56.2 OVARIAN CANCER, LEFT (H): ICD-10-CM

## 2017-08-10 LAB
ALBUMIN SERPL-MCNC: 3.8 G/DL (ref 3.4–5)
ALBUMIN UR-MCNC: NEGATIVE MG/DL
ALP SERPL-CCNC: 109 U/L (ref 40–150)
ALT SERPL W P-5'-P-CCNC: 24 U/L (ref 0–50)
AMYLASE SERPL-CCNC: 30 U/L (ref 30–110)
ANION GAP SERPL CALCULATED.3IONS-SCNC: 12 MMOL/L (ref 3–14)
APPEARANCE UR: CLEAR
AST SERPL W P-5'-P-CCNC: 22 U/L (ref 0–45)
BACTERIA #/AREA URNS HPF: ABNORMAL /HPF
BASOPHILS # BLD AUTO: 0 10E9/L (ref 0–0.2)
BASOPHILS NFR BLD AUTO: 0.5 %
BILIRUB SERPL-MCNC: 0.4 MG/DL (ref 0.2–1.3)
BILIRUB UR QL STRIP: NEGATIVE
BUN SERPL-MCNC: 10 MG/DL (ref 7–30)
CALCIUM SERPL-MCNC: 9 MG/DL (ref 8.5–10.1)
CANCER AG125 SERPL-ACNC: 175 U/ML (ref 0–30)
CHLORIDE SERPL-SCNC: 102 MMOL/L (ref 94–109)
CO2 SERPL-SCNC: 24 MMOL/L (ref 20–32)
COLOR UR AUTO: ABNORMAL
CREAT SERPL-MCNC: 0.81 MG/DL (ref 0.52–1.04)
DIFFERENTIAL METHOD BLD: ABNORMAL
EOSINOPHIL # BLD AUTO: 0.1 10E9/L (ref 0–0.7)
EOSINOPHIL NFR BLD AUTO: 1.4 %
ERYTHROCYTE [DISTWIDTH] IN BLOOD BY AUTOMATED COUNT: 12.9 % (ref 10–15)
GFR SERPL CREATININE-BSD FRML MDRD: 73 ML/MIN/1.7M2
GGT SERPL-CCNC: 21 U/L (ref 0–40)
GLUCOSE SERPL-MCNC: 101 MG/DL (ref 70–99)
GLUCOSE UR STRIP-MCNC: NEGATIVE MG/DL
HCT VFR BLD AUTO: 37.8 % (ref 35–47)
HGB BLD-MCNC: 12.5 G/DL (ref 11.7–15.7)
HGB UR QL STRIP: NEGATIVE
IMM GRANULOCYTES # BLD: 0 10E9/L (ref 0–0.4)
IMM GRANULOCYTES NFR BLD: 0.5 %
KETONES UR STRIP-MCNC: NEGATIVE MG/DL
LDH SERPL L TO P-CCNC: 147 U/L (ref 81–234)
LEUKOCYTE ESTERASE UR QL STRIP: NEGATIVE
LYMPHOCYTES # BLD AUTO: 1.9 10E9/L (ref 0.8–5.3)
LYMPHOCYTES NFR BLD AUTO: 28.5 %
MAGNESIUM SERPL-MCNC: 1.6 MG/DL (ref 1.6–2.3)
MCH RBC QN AUTO: 34.9 PG (ref 26.5–33)
MCHC RBC AUTO-ENTMCNC: 33.1 G/DL (ref 31.5–36.5)
MCV RBC AUTO: 106 FL (ref 78–100)
MONOCYTES # BLD AUTO: 0.7 10E9/L (ref 0–1.3)
MONOCYTES NFR BLD AUTO: 10.5 %
NEUTROPHILS # BLD AUTO: 3.9 10E9/L (ref 1.6–8.3)
NEUTROPHILS NFR BLD AUTO: 58.6 %
NITRATE UR QL: NEGATIVE
NRBC # BLD AUTO: 0 10*3/UL
NRBC BLD AUTO-RTO: 0 /100
PH UR STRIP: 6 PH (ref 5–7)
PLATELET # BLD AUTO: 232 10E9/L (ref 150–450)
POTASSIUM SERPL-SCNC: 3.9 MMOL/L (ref 3.4–5.3)
PROT SERPL-MCNC: 7.3 G/DL (ref 6.8–8.8)
RBC # BLD AUTO: 3.58 10E12/L (ref 3.8–5.2)
RBC #/AREA URNS AUTO: 1 /HPF (ref 0–2)
SODIUM SERPL-SCNC: 137 MMOL/L (ref 133–144)
SP GR UR STRIP: 1 (ref 1–1.03)
URN SPEC COLLECT METH UR: ABNORMAL
UROBILINOGEN UR STRIP-MCNC: 0 MG/DL (ref 0–2)
WBC # BLD AUTO: 6.6 10E9/L (ref 4–11)
WBC #/AREA URNS AUTO: 1 /HPF (ref 0–2)

## 2017-08-10 PROCEDURE — 82977 ASSAY OF GGT: CPT | Performed by: OBSTETRICS & GYNECOLOGY

## 2017-08-10 PROCEDURE — 85025 COMPLETE CBC W/AUTO DIFF WBC: CPT | Performed by: OBSTETRICS & GYNECOLOGY

## 2017-08-10 PROCEDURE — 86304 IMMUNOASSAY TUMOR CA 125: CPT | Performed by: OBSTETRICS & GYNECOLOGY

## 2017-08-10 PROCEDURE — 25000128 H RX IP 250 OP 636: Mod: ZF | Performed by: OBSTETRICS & GYNECOLOGY

## 2017-08-10 PROCEDURE — 25000128 H RX IP 250 OP 636: Performed by: OBSTETRICS & GYNECOLOGY

## 2017-08-10 PROCEDURE — 36591 DRAW BLOOD OFF VENOUS DEVICE: CPT

## 2017-08-10 PROCEDURE — 99214 OFFICE O/P EST MOD 30 MIN: CPT | Mod: ZP | Performed by: OBSTETRICS & GYNECOLOGY

## 2017-08-10 PROCEDURE — 82150 ASSAY OF AMYLASE: CPT | Performed by: OBSTETRICS & GYNECOLOGY

## 2017-08-10 PROCEDURE — 99212 OFFICE O/P EST SF 10 MIN: CPT | Mod: ZF

## 2017-08-10 PROCEDURE — 83735 ASSAY OF MAGNESIUM: CPT | Performed by: OBSTETRICS & GYNECOLOGY

## 2017-08-10 PROCEDURE — 80053 COMPREHEN METABOLIC PANEL: CPT | Performed by: OBSTETRICS & GYNECOLOGY

## 2017-08-10 PROCEDURE — 83615 LACTATE (LD) (LDH) ENZYME: CPT | Performed by: OBSTETRICS & GYNECOLOGY

## 2017-08-10 PROCEDURE — 81001 URINALYSIS AUTO W/SCOPE: CPT | Performed by: OBSTETRICS & GYNECOLOGY

## 2017-08-10 RX ORDER — HEPARIN SODIUM (PORCINE) LOCK FLUSH IV SOLN 100 UNIT/ML 100 UNIT/ML
5 SOLUTION INTRAVENOUS ONCE
Status: COMPLETED | OUTPATIENT
Start: 2017-08-10 | End: 2017-08-10

## 2017-08-10 RX ORDER — HEPARIN SODIUM (PORCINE) LOCK FLUSH IV SOLN 100 UNIT/ML 100 UNIT/ML
5 SOLUTION INTRAVENOUS EVERY 8 HOURS
Status: DISCONTINUED | OUTPATIENT
Start: 2017-08-10 | End: 2017-08-18 | Stop reason: HOSPADM

## 2017-08-10 RX ADMIN — SODIUM CHLORIDE, PRESERVATIVE FREE 5 ML: 5 INJECTION INTRAVENOUS at 08:30

## 2017-08-10 RX ADMIN — SODIUM CHLORIDE, PRESERVATIVE FREE 5 ML: 5 INJECTION INTRAVENOUS at 09:45

## 2017-08-10 ASSESSMENT — ENCOUNTER SYMPTOMS
DIZZINESS: 0
BREAST MASS: 0
COUGH: 0
BLOOD IN STOOL: 0
NECK PAIN: 0
TREMORS: 0
HOARSE VOICE: 0
NAUSEA: 0
NERVOUS/ANXIOUS: 0
PALPITATIONS: 0
SYNCOPE: 0
PANIC: 0
INCREASED ENERGY: 0
ALTERED TEMPERATURE REGULATION: 0
BLOATING: 0
DECREASED LIBIDO: 0
DYSURIA: 0
WEIGHT LOSS: 0
MYALGIAS: 0
DECREASED CONCENTRATION: 0
SNORES LOUDLY: 0
NIGHT SWEATS: 0
HALLUCINATIONS: 0
CONSTIPATION: 0
RECTAL PAIN: 0
STIFFNESS: 0
PARALYSIS: 0
RESPIRATORY PAIN: 0
MEMORY LOSS: 0
SEIZURES: 0
POLYPHAGIA: 0
HEMATURIA: 0
SWOLLEN GLANDS: 0
WEAKNESS: 0
HYPOTENSION: 0
TASTE DISTURBANCE: 0
SLEEP DISTURBANCES DUE TO BREATHING: 0
MUSCLE CRAMPS: 0
DISTURBANCES IN COORDINATION: 0
BACK PAIN: 0
WEIGHT GAIN: 0
WHEEZING: 0
HEARTBURN: 0
EYE PAIN: 0
HEMOPTYSIS: 0
DOUBLE VISION: 0
INSOMNIA: 0
FEVER: 0
EYE IRRITATION: 0
TROUBLE SWALLOWING: 0
CHILLS: 0
POSTURAL DYSPNEA: 0
SINUS PAIN: 0
TINGLING: 0
DIARRHEA: 1
BREAST PAIN: 0
HEADACHES: 0
LEG PAIN: 0
VOMITING: 0
JAUNDICE: 0
NAIL CHANGES: 0
HYPERTENSION: 0
DYSPNEA ON EXERTION: 0
EYE WATERING: 0
TACHYCARDIA: 0
DECREASED APPETITE: 0
DIFFICULTY URINATING: 0
SINUS CONGESTION: 0
EYE REDNESS: 0
JOINT SWELLING: 0
COUGH DISTURBING SLEEP: 0
LIGHT-HEADEDNESS: 0
ABDOMINAL PAIN: 0
EXERCISE INTOLERANCE: 0
DEPRESSION: 0
FATIGUE: 1
SMELL DISTURBANCE: 0
POLYDIPSIA: 0
POOR WOUND HEALING: 0
BRUISES/BLEEDS EASILY: 0
EXTREMITY NUMBNESS: 0
MUSCLE WEAKNESS: 0
SORE THROAT: 0
NECK MASS: 0
SPUTUM PRODUCTION: 0
BOWEL INCONTINENCE: 0
FLANK PAIN: 0
LEG SWELLING: 0
LOSS OF CONSCIOUSNESS: 0
NUMBNESS: 0
SPEECH CHANGE: 0
SHORTNESS OF BREATH: 0
ARTHRALGIAS: 0
HOT FLASHES: 0
CLAUDICATION: 0
ORTHOPNEA: 0
SKIN CHANGES: 0
RECTAL BLEEDING: 0

## 2017-08-10 ASSESSMENT — PAIN SCALES - GENERAL: PAINLEVEL: NO PAIN (0)

## 2017-08-10 NOTE — MR AVS SNAPSHOT
After Visit Summary   8/10/2017    Odalys Nathan    MRN: [id number]           Patient Information     Date Of Birth          1960        Visit Information        Provider Department      8/10/2017 8:20 AM Nga Yeung MD [facility]        Today's Diagnoses     Ovarian cancer, right (H)    -  1    Ovarian cancer, left (H)        Drug-induced neutropenia (H)           Follow-ups after your visit        Your next 10 appointments already scheduled     Sep 14, 2017 10:00 AM CDT   NM HEART MUGA REST with [id number]   [facility] ([facility])    [address]  [facility] [address] [phone]           Please bring a list of your medicines to the exam. (Include vitamins, minerals and over-the-counter drugs.) You should wear comfortable clothes. Leave your valuables at home. Please bring related prior results and films.  Tell your doctor:   If you are breastfeeding or may be pregnant.   If you have had a barium test within the past few days. Barium may change the results of certain exams.   If you think you may need sedation (medicine to help you relax).  You may eat and drink as normal.  Please call your Imaging Department at your exam site with any questions.            Sep 22, 2017  9:45 AM CDT   [facility] Lab Draw with UC MASYUSUF LAB DRAW   [facility] Lab Draw ([facility])    [address] [place] [address]   [phone]            Sep 22, 2017 10:20 AM CDT   (Arrive by 10:05 AM)   Return Active Treatment with HAILE De Paz CNP   [facility] ([facility])    [address] [place] [address]   [phone]            Sep 22, 2017 11:30 AM CDT   Infusion 240 with  ONCOLOGY INFUSION, UC 31 ATC   [facility] ([facility])    [facility]  [address] [address]   [phone]            Oct 20, 2017  9:45 AM CDT   [facility] Lab Draw with UC [facility]IC LAB DRAW   [facility] Draw ([facility])    [address] [place] [address]   [phone]            Oct 20, 2017 10:20 AM CDT   (Arrive by 10:05 AM)   Return Active Treatment with HAILE De Paz CNP   [facility] ([facility])    [address] [place] [address]   [phone]            Oct 20, 2017 11:30 AM CDT   Infusion 240 with UC ONCOLOGY INFUSION, UC 31 ATC   [facility] ([facility])    [address] [place] [address]   [phone]            Nov 17, 2017  9:45 AM CST   [facility] Lab Draw with UC [facility]ONIC LAB DRAW   [facility] Draw ([facility])    [address] [place] [address]   [phone]              Who to contact     If you have questions or need follow up information about today's clinic visit or your schedule please contact [facility] directly at [phone].  Normal or non-critical lab and imaging results will be communicated to you by [employer], letter or phone within 4 business days after the clinic has received the results. If you do not hear from us within 7 days, please contact the clinic through [employer] or phone. If you have a critical or abnormal lab result, we will notify you by phone as soon as possible.  Submit refill requests through [employer] or call your pharmacy and they will forward the refill request to us. Please allow 3 business days for your refill to be completed.          Additional Information About Your Visit        [employer] Information     [employer] gives you secure access to your electronic health record. If you see a primary care provider, you can also  send messages to your care team and make appointments. If you have questions, please call your primary care clinic.  If you do not have a primary care provider, please call [phone] and they will assist you.        Care EveryWhere ID     This is your Care EveryWhere ID. This could be used by other organizations to access your [place] medical records  [id number]        Your Vitals Were     Pulse Temperature Respirations Pulse Oximetry BMI (Body Mass Index)       104 98.4  F (36.9  C) 16 98% 22.97 kg/m2        Blood Pressure from Last 3 Encounters:   08/10/17 139/86   07/13/17 123/69   06/15/17 146/83    Weight from Last 3 Encounters:   08/10/17 62.6 kg (138 lb 0.1 oz)   07/13/17 62 kg (136 lb 11.2 oz)   06/15/17 62.4 kg (137 lb 8 oz)              We Performed the Following     Amylase          CBC with platelets differential     Comprehensive metabolic panel     GGT     Lactate Dehydrogenase     Magnesium     Routine UA with microscopic          Today's Medication Changes          These changes are accurate as of: 8/10/17 11:59 PM.  If you have any questions, ask your nurse or doctor.               These medicines have changed or have updated prescriptions.        Dose/Directions    cyanocobalamin 1000 MCG/ML injection   Commonly known as:  VITAMIN B12   This may have changed:  when to take this   Used for:  B12 deficiency        Dose:  1 mL   Inject 1 mL (1,000 mcg) into the muscle every 30 days   Quantity:  1 mL   Refills:  11       magnesium oxide 400 MG tablet   Commonly known as:  MAG-OX   This may have changed:  when to take this   Used for:  Ovarian cancer, unspecified laterality (H)        Dose:  400 mg   Take 1 tablet (400 mg) by mouth 2 times daily   Quantity:  90 tablet   Refills:  3                Primary Care Provider Office Phone # Fax #    Aubrie Airam Hester [phone]       [facility] [address] [address]  [facility] [address]        Equal Access to Services      SANDY CHAMBERS : Hadii aad ku naima Walter, wasarada luqadaha, qaybta kaalkira teressadarianajulieta, blanka rosleyn haymarquise esquivelmotoshia beckman . So [facility] [phone].    ATENCIÓN: Si habla von, tiene a bell disposición servicios gratuitos de asistencia lingüística. Llame al [phone].    We comply with applicable federal civil rights laws and [place] laws. We do not discriminate on the basis of race, color, national origin, age, disability sex, sexual orientation or gender identity.            Thank you!     Thank you for choosing [facility]  for your care. Our goal is always to provide you with excellent care. Hearing back from our patients is one way we can continue to improve our services. Please take a few minutes to complete the written survey that you may receive in the mail after your visit with us. Thank you!             Your Updated Medication List - Protect others around you: Learn how to safely use, store and throw away your medicines at www.disposemymeds.org.          This list is accurate as of: 8/10/17 11:59 PM.  Always use your most recent med list.                   Brand Name Dispense Instructions for use Diagnosis    ASPIRIN PO      Take 325 mg by mouth daily        CALCIUM + D PO      Take 1 tablet by mouth daily.    Pelvic mass       cyanocobalamin 1000 MCG/ML injection    VITAMIN B12    1 mL    Inject 1 mL (1,000 mcg) into the muscle every 30 days    B12 deficiency       diphenoxylate-atropine 2.5-0.025 MG per tablet    LOMOTIL    60 tablet    Take 2 tablets by mouth 4 times daily as needed for diarrhea    Acute diarrhea       ferrous sulfate 325 (65 FE) MG tablet    IRON    90 tablet    Take 1 tablet (325 mg) by mouth 3 times daily (with meals) Take with small amount of orange juice, do not take with calcium    Ovarian cancer, right (H), Anemia, unspecified type, Ovarian cancer, left (H)       HERBALS      daily        LEVOTHYROXINE SODIUM PO      Take by mouth daily         LORazepam 1 MG tablet    ATIVAN    30 tablet    Take 1 tablet (1 mg) by mouth every 6 hours as needed (Anxiety, Nausea/Vomiting or Sleep)    Ovarian cancer, unspecified laterality (H), Drug induced neutropenia(288.03)       magnesium oxide 400 MG tablet    MAG-OX    90 tablet    Take 1 tablet (400 mg) by mouth 2 times daily    Ovarian cancer, unspecified laterality (H)       METFORMIN HCL PO      Take 500 mg by mouth daily        order for DME     3 each    Injection Supplies for Vitamin B12: 3cc syringes w/ 27 gauge needles, 1/2 inch length    B12 deficiency       potassium chloride 20 MEQ Packet    KLOR-CON     Take 20 mEq by mouth daily        study - niraparib 100 mg capsule    IDS# [id number]    84 capsule    Take 2 capsules (200 mg) by mouth every morning Take at the same time each day, preferably morning. Swallow whole and do NOT chew capsules. Food and water is permissible. First dose will be administered on site.    Ovarian cancer, right (H), Ovarian cancer, left (H), Drug-induced neutropenia (H)       VITAMIN C PO      Take 500 mg by mouth daily    Pelvic mass       VITAMIN D PO      Take 1,000 Units by mouth daily Unknown dose        VITAMIN E NATURAL PO      Take 100 Units by mouth daily

## 2017-08-10 NOTE — LETTER
8/10/2017       RE: Odalys Nathan  [address] [facility] [place] [address]     Dear Colleague,    Thank you for referring your patient, Odalys Nathan, to the [facility]. Please see a copy of my visit note below.                Follow Up Notes on Referred Patient    Date: 8/10/2017       Dr. Aubrie Hester  [facility]  [address] [place] [address]       RE: Odalys Nathan  : 1960  HOLA: 8/10/2017    Dear Dr. Aubrie Hester:    Odalys Nathan is a 56 year old woman with a diagnosis of recurrent stage IIIC bilateral ovarian cancer.   She is currently on the TESARO study. She is here today for follow up and disease management.     Brief Oncology History:  2012 - Admitted to hospital for 2 weeks of intermittent abdominal cramping, distention, diarrhea and N/V. CT of abdomen/pelvis significant for small bowel obstruction, a heterogenous soft tissue density in the pelvis, omental nodules, and ascites. Bilateral adnexal masses per U/S of pelvis. CA-125 was elevated at 987, CEA was normal at 1.0.    12 - Therapeutic paracentesis (4 L) with cytology confirming malignancy (MS positive, weak ER positive, CK-7 positive consistent with GYN primary). Surgery recommended d/t potential for falling blood counts 2/2 chemotherapy (patient is Jew and limiting blood transfusion)    12 - Exploratory laparotomy, MAR BSO, lysis of adhesion, Appendectomy, Repair cystotomy, Omentectomy Wqfe-xpaszk-qwthsazjy-transverse-colon resection, Ileo-descending colon anastomosis, CUSA, & Colonoscopy (done by Dr. Amato and colorectal team).  2012 - Admitted to hospital for bilateral pulmonary emboli and drainage of pleural effusion. Started on Lovenox.  12-3/21/12: Cycle #1-2 Carbo/Taxol IV  3/29/12 - Started on Keflex by her PCP for infection in her healing wound (immediately below her umbilicus).    12-12: Cycle #3-6 IV  "chemo, Cycle #1 IV/IP chemo  7/16/12 -  8, CT PRADEEP - enrolled in  - observation arm  3/4/13-6/28/13:  6, 9, 12, 15, 20.  Decision made to start Doxil/Carbo.  7/11/13: The left ventricular ejection fraction is normal at 66.4%.  7/12/13-9/6/13: Cycle #1-3 Doxil/Carbo.  10, 11.    2/20/14: Decision to take break from chemo for two months, followed by CT and CA-125.    4/21/14  27  Exploratory laparotomy and removal of mesenteric masses, tumor debulking, peritoneal biopsies and intraperitoneal port placement. On laparoscopy, it was noted that there were small nodules on the anterior abdominal wall near the previous incision, small nodules on the right pelvic sidewall as well. Nodules were palpated in the mesentery; however, as it was unable to clarify where the origin of the nodules was, the decision was made to open the patient. On opening there was found to be approximately a 3 cm nodule in the small bowel mesentery and another separate approximately 2 cm nodule in the bowel mesentery. Pelvis without evidence of cancer, some mesenteric lymph nodes were palpated. No evidence otherwise of any disseminated cancer throughout the abdomen.    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 (2/2)  -Largest metastasis: 1.3 cm  -See comment  D: Peritoneum, right paracolic gutter #2, biopsy:  -Fibroadipose tissue with granulomatous inflammation surrounding refractile material  -Negative for malignancy   E: Small bowel adhesion, biopsy:  -Fibroconnective tissue, consistent with adhesion  -Negative for malignancy  F: Lymph nodes, mesentry, not otherwise " "specified, excision:  -Two lymph nodes, negative for metastatic carcinoma (0/2)  G: Lymph node, mesentery, \"#2\", excision:  -One lymph node, negative for metastatic carcinoma (0/1)  H: Lymph nodes, mesentery, \"nodule #2\", excision:  -Five lymph nodes, negative for metastatic carcinoma (0/5)  COMMENT:  Some of the specimens show post-operative changes. Others show possible treatment related changes, including necrosis. The metastatic carcinoma in the mesenteric lymph nodes (specimen C) shows variable morphology, including relatively low grade tumor with papillary architecture, and high grade tumor comprised of nests of tumor cells with irregular, slit-like spaces and marked nuclear pleomorphism.    5/29/14: Cycle 1 IV PACLitaxel / IP CISplatin / IP PACLitaxel.  - 28.  6/26/14: Cycle #2 IV/IP.  10  8/5/14: CT chest/abd/pelvis IMPRESSION     1. In this patient with ovarian cancer, overall findings are indicative of stable/slight improvement, as multiple mesenteric lymphadenopathy and scattered nodular peritoneal soft tissue mass lesions appear unchanged or slightly smaller since 4/21/2014.    2. 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 Mild dilatation of the second and the third portion of the duodenum with a narrow SMA angle. This could represent SMA syndrome, if clinically correlated.17/14:    Cycle #3 IV/IP.  10.    8/7/14: Cycle #4 Taxol/Carbo (changed from IV/IP).  10. She has been feeling okay. She is unsure if she can finish out the course of 6 cycles IV/IP taxol/cisplatin. She feels like she has the flu for about a week then starts feeling gradually better after each chemo cycle. Her spouse notes that she actually has been more sick with the treatments than she initially admits here. She was also previously having some rib pain. Denies any rib pain now. Denies any chest pain or shortness of breath.  8/20/14: Remove Intraperitoneal Port ( Port and catheter intact - " discarded)  8/28/14: Cycle #5 Taxol/Carbo held due to thrombocytopenia.  6.    9/29/14: Cycle #6 Taxol/Carbo  6. Insurance questions regarding GSF coverage today. No concerns other than fatigue. Taking iron for anemia and does not desire blood transfusion. Using neulasta for neutropenia. Using home IV hydration if needed. Baseline unchanged. No abdominal bloating, constipation, diarrhea, pain, vaginal or rectal bleeding, cough or dyspnea, fluid retention.      10/20/14:  5. CT chest/abdomen/pelvis on 10/16/14 showed nodular peritoneal soft tissue mass in the right lower quadrant adjacent to the psoas muscle is no longer appreciated. Adjacent prominent lymphadenopathy is unchanged from previous exam. 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  1/27/15:  6.  4/28/15:  14.  5/26/15:  18.     6/2/15: CT cap Impression:  1. Postsurgical changes of hysterectomy and bilateral salpingo-oophorectomy for ovarian cancer. There is a new 8 mm hazy, ill-defined hypoattenuating lesion in hepatic segment 6 which is suspicious for a metastatic deposit. Further evaluation with ultrasound in recommended.    2. Increased size of a left retroperitoneal lymph node which is indeterminate but may represent a ciro metastasis. Mildly prominent lymph nodes in the right lower quadrant are not significantly changed.  3. Moderate colonic stool burden.      6/4/15: US abdomen IMPRESSION:    Hyperechoic lesion in the right hepatic lobe, consistent with hemangioma. This does not corresponds to the area of the lesion seen on CT from 6/2/2015. An MR would be helpful for identifying and characterizing the lesion from the recent CT.    6/12/15: MR abd IMPRESSION:  1. New 20 x 11 mm enhancing lesions between the right obliques, concerning for metastatic disease. This lesions should be amenable to percutaneous biopsy, if indicated.  2. Correlating to the lesion visualized on  comparison CT is a hepatic segment 6 subcapsular 7 mm lesion. Overall the appearance favors the diagnosis of a simple cyst. However, there is faint suggestion of mild peripheral arterial enhancement. Although this is favored as  artifactual, this should be followed up to confirm stability. Recommend 6 month followup.  3. Hepatic segment 6, 5 mm lesion too small to technically characterize. Differential would favor FNH, less likely flash filling hemangioma. Recommend attention on followup.     9/1/15-10/15/15: Cycle #1-3 Avastin/Cytoxan.   31, 19, 16.     11/6/15: CT c/a/p IMPRESSION:    1. Stable postoperative change of MAR/BSO for ovarian cancer.  2. The lesion in the right flank abdominal musculature is slightly decreased in size. Otherwise, stable examination.  2. No evidence of metastatic disease in the chest.     11/20/15:  16     11/25/15: surgical pathology report  FINAL DIAGNOSIS:  Soft tissue, right oblique muscle mass, excision:  -Recurrent ovarian serous carcinoma  -Carcinoma is present less than 1 mm from one resection margin  -Background skeletal muscle and fibroadipose tissue     1/4/16:  22  1/11/16-1/27/16: Radiation to right flank x 12 treatments     4/7/2016:  94. CT cap IMPRESSION:    In this patient with ovarian cancer status post MAR/BSO and descending/transverse colectomy:  1. No evidence for malignancy in the chest, abdomen, or pelvis.  2. Stable small hypodense segment 6 liver lesion, appears more likely benign, possibly a cyst.     5/13/16:  124.     6/3/16: PET CT IMPRESSION: In this patient with a history of ovarian cancer:  1. Hypermetabolic and enlarging periaortic and perihepatic lymphadenopathy compatible with metastatic disease, as detailed above.  2. Although hypodense lesion in hepatic segment 6 has been present since 6/2/2015 associated hypermetabolism makes this lesion highly concerning for metastatic disease.     6/9/16:  137.  6/14/16: Cycle  #1 Niraparib.    6/28/16: Cycle #1 D15 Niraparib.    7/5/16:  100.    7/11/16: Cycle #2 Niraparib.   83.     8/3/2016: PET CT IMPRESSION:    In this patient with known history of ovarian cancer:  1) New pleural based nodular opacities in the lateral and inferior aspects of the bilateral lower lobes, worse in the left lung. Likely infection. Close follow up is recommended.      2) Slight decrease in hypermetabolic abdominal lymphadenopathy. 2 hypermetabolic lymph nodes persist.  3) Unchanged right hepatic lobe metastatic lesion.      8/9/16: Cycle #3 Niraparib.  69.  9/6/16: Cycle #4 Niraparib.  53. Dose held due to anemia.  9/13/16: Eval for potential cycle 4 niraparib. Dose held due to anemia.     10/4/16: CT CAP impression:   In this patient with a known history of ovarian cancer:  1. There has been interval resolution of pleural-based nodular opacities which likely represented infection.  2. Abdominal lymphadenopathy in the form of 2 portacaval lymph nodes have not significantly changed in size, noted to be hypermetabolic on prior PET/CT.  3. Previously demonstrated metastatic lesion in the right lobe of the liver is not significantly changed.  10/11/16:  60  11/1/16: Cycle #6 Niraparib,  75  11/29/16: Cycle #7 Niraparib.  78. CT CAP impression as follows:  Target lesions (RECIST criteria):    A previously described target lesion superior to the head of the pancreas (series 2, image 64)  (referred to as a perihepatic node on  6/3/2016) may not be a valid target lesion because it measured less than 1.5 cm originally. However, this particular node has decreased in size, now measuring 7 mm in short axis versus 14 mm on 6/3/2016 when measured in a similar fashion.    2.3 cm short axis portacaval lymph node on series 2 image 67, previously 2.0 cm on 10/4/2016.  1.2 cm subtle hypodensity in hepatic segment 6 on series 2 image 75,stable on multiple studies since at least  6/3/2016 Sum of diameters today: 3.5 cm. Sum of diameters 10/4/2016: 3.2 cm. Growth = 9%.       12/27/16: Cycle #8 Niraparib.  105.  1/25/17: Cycle #9 Niraparib.  108     2/20/17: CT CAP Impression IMPRESSION:   1. In this patient with history of ovarian cancer there is stable  disease by RECIST criteria as evidenced by:                           1a. Mildly increased size of liver metastasis.                          1b. Stable portacaval lymphadenopathy.                          1c. No evidence of metastatic disease in the chest.  2. Trace emphysematous changes of the lungs.     2/23/17:  pending, Cycle #10 Niraparib  CT CAP impression:  Sum of target lesion diameters today: 3.7 cm. Sum of target lesion diameters on 11/28/2016: 3.5 cm. Growth= 6%  1. In this patient with history of ovarian cancer there is stable disease by RECIST criteria as evidenced by:   1a. Mildly increased size of liver metastasis.  1b. Stable portacaval lymphadenopathy.  1c. No evidence of metastatic disease in the chest.  2. Trace emphysematous changes of the lungs.  3/22/17: Cycle #11 Niraparib.  132.  4/19/17: Cycle #12 Niraparib.  127.     5/16/17: CT CAP IMPRESSION: In this patient with ovarian cancer:  1. Mildly increased size of hepatic metastasis segment 6 with subtle increased capsular retraction.  2. Stable edmundo hepatis nodes, with mild increase in size of periaortic lymph nodes.  3. Prominent left supraclavicular lymph node with subtle increase in size compared to prior studies, particularly comparing to 10/4/2016.  4. Mild subtle groundglass opacities in the right upper lobe, not present on prior study, lesser extent in the right lower lobe.  Findings may represent infection, additional consideration is malignancy (less likely), and attention on follow-up study  Recommended.  Addendum:   Prominent left supraclavicular lymph node (3/25) is stable from most recent CT performed 2/20/2017, currently  measuring 14 x 16 mm, previously 14 x 16 mm on 2/20/2017.      The portal caval lymph node/edmundo hepatis lymph node is stable from 2/20/2017, measuring 21 mm.      Clarification of size of the para-aortic lymph node (series 3 image 327). It short axis measurement is 11 mm versus 9 mm on prior study.      Hepatic segment 6 triangular-shaped low density lesion (3/362) measures 14 mm, previously measured 12 mm, demonstrating a  possible/questionable minimal subtle increase in size. Similarly the lymph nodes noted above in the supraclavicular and para-aortic regions demonstrate possible minimal possible subtle increase in size.      5/18/17: Cycle #13 Niraparib.  191.  6/15/17: Cycle #14 Niraparib.  146.  7/13/17: Cycle #15 Niraparib 200 mg.  171    CT (8/9/17):       IMPRESSION:  1. Segment 6 hepatic metastasis is stable to minimally increased in  size.  2. Slight increase in multicentric adenopathy, most pronounced at the  edmundo hepatis. Additional sites include the inferior left  neck/supraclavicular region and retroperitoneum which appear stable to  minimally increased.     8/10/17: Labs pending        Date Value Ref Range Status   08/10/2017 175 (H) 0 - 30 U/mL Final     Comment:     Assay Method:  Chemiluminescence using Siemens Centaur XP   07/13/2017 171 (H) 0 - 30 U/mL Final     Comment:     Assay Method:  Chemiluminescence using Siemens Centaur XP   06/15/2017 146 (H) 0 - 30 U/mL Final     Comment:     Assay Method:  Chemiluminescence using Siemens Centaur XP   05/18/2017 191 (H) 0 - 30 U/mL Final     Comment:     Assay Method:  Chemiluminescence using Siemens Centaur XP   04/19/2017 127 (H) 0 - 30 U/mL Final     Comment:     Assay Method:  Chemiluminescence using Siemens Centaur XP   03/22/2017 132 (H) 0 - 30 U/mL Final     Comment:     Assay Method:  Chemiluminescence using Siemens Centaur XP   02/23/2017 132 (H) 0 - 30 U/mL Final     Comment:     Assay Method:  Chemiluminescence  using Siemens Centaur XP   01/25/2017 108 (H) 0 - 30 U/mL Final     Comment:     Assay Method:  Chemiluminescence using Siemens Centaur XP   12/27/2016 105 (H) 0 - 30 U/mL Final     Comment:     Assay Method:  Chemiluminescence using Siemens Centaur XP   11/28/2016 78 (H) 0 - 30 U/mL Final     Comment:     Assay Method:  Chemiluminescence using Siemens Centaur XP         Today she comes to clinic feeling well.  No new concerns. She denies any vaginal bleeding, no changes in her bowel (has liquid stools which is not new) or bladder habits, no nausea/emesis, no lower extremity edema, and no difficulties eating or sleeping. She denies any abdominal pain.  Denies fevers, chills, chest pain, and SOB. She has been taking her Niraparib and tolerating it well.         Review of Systems     Constitutional:  Positive for fatigue. Negative for fever, chills, weight loss, weight gain, decreased appetite, night sweats, recent stressors, height gain, height loss, post-operative complications, incisional pain, hallucinations, increased energy, hyperactivity and confused.   HENT:  Positive for tinnitus. Negative for ear pain, hearing loss, nosebleeds, trouble swallowing, hoarse voice, mouth sores, sore throat, ear discharge, tooth pain, gum tenderness, taste disturbance, smell disturbance, hearing aid, bleeding gums, dry mouth, sinus pain, sinus congestion and neck mass.    Eyes:  Negative for double vision, pain, redness, eye pain, decreased vision, eye watering, eye bulging, eye dryness, flashing lights, spots, floaters, strabismus, tunnel vision, jaundice and eye irritation.   Respiratory:   Negative for cough, hemoptysis, sputum production, shortness of breath, wheezing, sleep disturbances due to breathing, snores loudly, respiratory pain, dyspnea on exertion, cough disturbing sleep and postural dyspnea.    Cardiovascular:  Negative for chest pain, dyspnea on exertion, palpitations, orthopnea, claudication, leg swelling,  fingers/toes turn blue, hypertension, hypotension, syncope, history of heart murmur, chest pain on exertion, chest pain at rest, pacemaker, few scattered varicosities, leg pain, sleep disturbances due to breathing, tachycardia, light-headedness, exercise intolerance and edema.   Gastrointestinal:  Positive for diarrhea. Negative for heartburn, nausea, vomiting, abdominal pain, constipation, blood in stool, melena, rectal pain, bloating, hemorrhoids, bowel incontinence, jaundice, rectal bleeding, coffee ground emesis and change in stool.   Genitourinary:  Negative for bladder incontinence, dysuria, urgency, hematuria, flank pain, vaginal discharge, difficulty urinating, genital sores, dyspareunia, decreased libido, nocturia, voiding less frequently, arousal difficulty, abnormal vaginal bleeding, excessive menstruation, menstrual changes, hot flashes, vaginal dryness and postmenopausal bleeding.   Musculoskeletal:  Negative for myalgias, back pain, joint swelling, arthralgias, stiffness, muscle cramps, neck pain, bone pain, muscle weakness and fracture.   Skin:  Negative for nail changes, itching, poor wound healing, rash, hair changes, skin changes, acne, warts, poor wound healing, scarring, flaky skin, Raynaud's phenomenon, sensitivity to sunlight and skin thickening.   Neurological:  Negative for dizziness, tingling, tremors, speech change, seizures, loss of consciousness, weakness, light-headedness, numbness, headaches, disturbances in coordination, extremity numbness, memory loss, difficulty walking and paralysis.   Endo/Heme:  Negative for anemia, swollen glands and bruises/bleeds easily.   Psychiatric/Behavioral:  Negative for depression, hallucinations, memory loss, decreased concentration, mood swings and panic attacks.    Breast:  Negative for breast discharge, breast mass, breast pain and nipple retraction.   Endocrine:  Negative for altered temperature regulation, polyphagia, polydipsia, unwanted hair  growth and change in facial hair.        Past Medical History:    Past Medical History:   Diagnosis Date     Antiplatelet or antithrombotic long-term use      Ascites      Blood clot in the legs      Diabetes (H)      Ovarian cancer (H)     serous,stg IV     Pleural effusion      Pulmonary embolism (H) 2/2012     Refusal of blood transfusions as patient is Jain      Short gut syndrome      Subclinical hypothyroidism 4/18/2013     Thrombosis of leg          Past Surgical History:    Past Surgical History:   Procedure Laterality Date     COLECTOMY       COLONOSCOPY  2/1/2012    Procedure:COLONOSCOPY; With Biopsy; Surgeon:TONI SULLIVAN; Location:[facility]     HYSTERECTOMY TOTAL ABD, RHIANNA SALPINGO-OOPHORECTOMY, NODE DISSECTION, TUMOR DEBULKING, COMBINED  2/1/2012    Procedure:COMBINED HYSTERECTOMY TOTAL ABDOMINAL, BILATERAL SALPINGO-OOPHORECTOMY, NODE DISSECTION, TUMOR DEBULKING;  Exploratory Laparotomy, Total Abdominal Hysterectomy, Bilateral Salpingo-Oophorectomy, appendectomy,lysis of adhesions, ileal, ascending, transverse and splenic flexure resection, ileal descending bowel renanastomosis, incidental cystotomy repair, CUSA procedure and colonoscopy ; [facility]     INSERT PORT PERITONEAL ACCESS  4/3/2012    Procedure:INSERT PORT PERITONEAL ACCESS; Intraperitoneal Port Placement (c-arm); Surgeon:SAMUEL CARRASCO; Location:[facility]     INSERT PORT PERITONEAL ACCESS  5/14/2014    Procedure: INSERT PORT PERITONEAL ACCESS;  Surgeon: Nga Yeung MD;  Location: [facility] OR     INSERT PORT VASCULAR ACCESS       LAPAROSCOPY DIAGNOSTIC (GYN)  5/14/2014    Procedure: LAPAROSCOPY DIAGNOSTIC (GYN);  Surgeon: Nga Yeung MD;  Location: [facility]     LAPAROTOMY EXPLORATORY Right 11/25/2015    Procedure: LAPAROTOMY EXPLORATORY;  Surgeon: Nga Yeung MD;  Location: [facility]     LAPAROTOMY, TUMOR DEBULKING, COMBINED  5/14/2014    Procedure: COMBINED LAPAROTOMY, TUMOR DEBULKING;  Surgeon: Nga Yeung MD;   Location: [facility]     REMOVE CATHETER PERITONEAL N/A 8/20/2014    Procedure: REMOVE CATHETER PERITONEAL;  Surgeon: Nga Yeung MD;  Location: [facility] OR     VASCULAR SURGERY      stent left iliac vein         Health Maintenance Due   Topic Date Due     TETANUS IMMUNIZATION (SYSTEM ASSIGNED)  08/03/1976     LIPID SCREEN Q5 YR FEMALE (SYSTEM ASSIGNED)  08/03/2003     ADVANCE DIRECTIVE PLANNING Q5 YRS  08/03/2013     MAMMO SCREEN Q2 YR (SYSTEM ASSIGNED)  02/01/2015     PAP SCREENING Q3 YR (SYSTEM ASSIGNED)  04/04/2016       Current Medications:     Current Outpatient Prescriptions   Medication Sig Dispense Refill     study - niraparib (IDS# [id number]) 100 mg capsule Take 2 capsules (200 mg) by mouth every morning Take at the same time each day, preferably morning. Swallow whole and do NOT chew capsules. Food and water is permissible. First dose will be administered on site. 84 capsule 0     study - niraparib (IDS# [id number]) 100 mg capsule Take 2 capsules (200 mg) by mouth every morning Take at the same time each day, preferably morning. Swallow whole and do NOT chew capsules. Food and water is permissible. First dose will be administered on site. 84 capsule 0     ferrous sulfate (IRON) 325 (65 FE) MG tablet Take 1 tablet (325 mg) by mouth 3 times daily (with meals) Take with small amount of orange juice, do not take with calcium 90 tablet 3     study - niraparib (IDS# [id number]) 100 mg capsule Take 2 capsules (200 mg) by mouth every morning Take at the same time each day, preferably morning. Swallow whole and do NOT chew capsules. Food and water is permissible. First dose will be administered on site. 84 capsule 0     study - niraparib (IDS# [id number]) 100 mg capsule Take 2 capsules (200 mg) by mouth every morning Take at the same time each day, preferably morning. Swallow whole and do NOT chew capsules. Food and water is permissible. First dose will be administered on site. 84 capsule 0     study - niraparib (IDS# [id number]) 100 mg  capsule Take 2 capsules (200 mg) by mouth every morning Take at the same time each day, preferably morning. Swallow whole and do NOT chew capsules. Food and water is permissible. First dose will be administered on site. 84 capsule 0     LEVOTHYROXINE SODIUM PO Take by mouth daily        order for DME Injection Supplies for Vitamin B12: 3cc syringes w/ 27 gauge needles, 1/2 inch length 3 each 0     LORazepam (ATIVAN) 1 MG tablet Take 1 tablet (1 mg) by mouth every 6 hours as needed (Anxiety, Nausea/Vomiting or Sleep) 30 tablet 2     METFORMIN HCL PO Take 500 mg by mouth daily       diphenoxylate-atropine (LOMOTIL) 2.5-0.025 MG per tablet Take 2 tablets by mouth 4 times daily as needed for diarrhea 60 tablet 0     cyanocobalamin (VITAMIN B12) 1000 MCG/ML injection Inject 1 mL (1,000 mcg) into the muscle every 30 days (Patient taking differently: Inject 1 mL into the muscle once a week ) 1 mL 11     magnesium oxide (MAG-OX) 400 MG tablet Take 1 tablet (400 mg) by mouth 2 times daily (Patient taking differently: Take 400 mg by mouth daily ) 90 tablet 3     ASPIRIN PO Take 325 mg by mouth daily       potassium chloride (KLOR-CON) 20 MEQ packet Take 20 mEq by mouth daily       VITAMIN E NATURAL PO Take 100 Units by mouth daily       Cholecalciferol (VITAMIN D PO) Take 1,000 Units by mouth daily Unknown dose       HERBALS daily        Ascorbic Acid (VITAMIN C PO) Take 500 mg by mouth daily        Calcium Carbonate-Vitamin D (CALCIUM + D PO) Take 1 tablet by mouth daily.           Allergies:        Allergies   Allergen Reactions     Nkda [No Known Drug Allergies]         Social History:     Social History   Substance Use Topics     Smoking status: Former Smoker     Years: 8.00     Types: Cigarettes     Quit date: 6/21/1980     Smokeless tobacco: Never Used      Comment: started at 11 yo and quit at 18 yo     Alcohol use Yes      Comment: 3x/day wine or jodi       History   Drug Use No         Family History:     The  patient's family history is notable for:    Family History   Problem Relation Age of Onset     CANCER Mother 69     lung, smoker     CANCER Maternal Uncle 65     brain     Colon Cancer Maternal Aunt 80     colon         Physical Exam:     /86  Pulse 104  Temp 98.4  F (36.9  C)  Resp 16  Wt 62.6 kg (138 lb 0.1 oz)  SpO2 98%  BMI 22.97 kg/m2  Body mass index is 22.97 kg/(m^2).    General Appearance: healthy and alert, no distress     HEENT: no thyromegaly, no palpable nodules or masses        Cardiovascular: regular rate and rhythm, no gallops, rubs or murmurs     Respiratory: lungs clear, no rales, rhonchi or wheezes, normal diaphragmatic excursion    Musculoskeletal: extremities non tender and without edema    Skin: no lesions or rashes     Neurological: normal gait, no gross defects     Psychiatric: appropriate mood and affect                               Hematological: normal cervical, supraclavicular lymph nodes     Gastrointestinal:       abdomen soft, non-tender, non-distended, no organomegaly or masses    Genitourinary: Not indicated      Assessment:    Odalys Nathan is a 56 year old woman with a diagnosis of recurrent stage IIIC bilateral ovarian cancer.   She is currently on the TESARO study. She is here today for follow up and disease management. Unfortunately based on RECIST she has had a 33% progression.    25 minutes were spent with this patient, over 50% of that time was spent in symptom management, treatment planning and in counseling and coordination of care.      Plan:     1.)       Recurrent stage IIIC bilateral ovarian cancer, currently on TESARO-niraparib study.  Discussed CT results and 33% progression, making her ineligible to continue study.  Overall, patient has been relatively stable over the last year on this therapy.  Discussed treatment options including expectant management with repeat imaging in 2-3 months verses initiation of chemotherapy with either carbo/taxol x6  cycles or carbo/doxil .  As long as patient demonstrates response to platinum based chemo regimen, patient could resume niraparib as maintenance outside of study as a possibility.  Patient is going to consider options, will likely decide to pursue chemotherapy at the beginning of September. Patient aware she is now going to have to stop the Niraparib based on RECIST disease progression.    ECOG = 0.     2.) Genetic risk factors were assessed and she is negative for mutations in BRCA1, BRCA2, EPCAM, MLH1, MSH2, MSH6, PMS2, PTEN, and TP53 genes    3.) Labs and/or tests ordered include:  Study labs. .      4.) Health maintenance issues addressed today include annual health maintenance and non-gynecologic issues with PCP.    5.)        Hypomagnesemia: continue to take supplement and monitor.     Nga Yeung MD    Department of Ob/Gyn and Women's Health  Division of Gynecologic Oncology  [facility]  [phone]    Of a 35 minute appointment, more than 50% was spent in counseling the patient.        CC  Patient Care Team:  Aubrie Almanza as PCP - General  Nga Yeung MD as MD (Oncology)  Patricia Rocha APRN CNP as Nurse Practitioner (Nurse Practitioner)  Breonna Alvarado RN as Registered Nurse  AUBRIE ALMANZA                            Lab and now off study .  Reviewed and stable.  Tammy CISNEROS, RN, OCN  Care Coordinator   Gynecologic Cancer   Office [phone] Pager [phone] #[phone]

## 2017-08-10 NOTE — PROGRESS NOTES
"Care Coordinator Note  Ask to see patient no longer on study do to slight increase in disease.   PT is  deciding  on Taxol/Carbo  Vs Doxil/Carbo, gut is edging toward the Doxil/ Carbo  Pt given written information on the 3 drugs and potential side effects discussed.  \" When I start it will be after 9/10.\"   Will contact patient the end of August to finalize her plans.            Patient and  verbalized back understanding of the above information discussed.   Face to face time spent with patient 15.  Tammy CISNEROS RN, OCN  Care Coordinator   Gynecologic Cancer   Office [phone]  Pager [phone] #[phone]  "

## 2017-08-10 NOTE — NURSING NOTE
Chief Complaint   Patient presents with     Port Draw     Labs drawn by RN from port. VS taken.      SHAHANA GARCIA RN

## 2017-08-10 NOTE — NURSING NOTE
"Oncology Rooming Note    August 10, 2017 8:53 AM   Odalys Nathan is a 57 year old female who presents for:    Chief Complaint   Patient presents with     Port Draw     Labs drawn by RN from port. VS taken.      Oncology Clinic Visit     Ovarian CA     Initial Vitals: /86  Pulse 104  Temp 98.4  F (36.9  C)  Resp 16  Wt 62.6 kg (138 lb 0.1 oz)  SpO2 98%  BMI 22.97 kg/m2 Estimated body mass index is 22.97 kg/(m^2) as calculated from the following:    Height as of 7/13/17: 1.651 m (5' 5\").    Weight as of this encounter: 62.6 kg (138 lb 0.1 oz). Body surface area is 1.69 meters squared.  No Pain (0) Comment: Data Unavailable   No LMP recorded. Patient has had a hysterectomy.  Allergies reviewed: Yes  Medications reviewed: Yes    Medications: MEDICATION REFILLS NEEDED TODAY. Provider was notified.  Pharmacy name entered into [employer]:    [id number] - [place], [place] - [address] [facility] - [place], [place] - [address]  [facility] - [place], [place] - [address]    Clinical concerns: Refill of Lorazepam needed today. Dr Yeung was notified.    6 minutes for nursing intake (face to face time)     Sophia Oneill CMA              "

## 2017-08-10 NOTE — NURSING NOTE
Patient here for EOT visit for the Tesaro/Quadra Niraparib study.  Patient will be discontinuing treatment due to disease progression by RECIST.  All EOT procedures completed per protocol.  Patient denies any new issues or concerns at this time.  Patient will be followed up on every 3 months per the protocol.  Patient returned 37 tabs of the Niraparib along with the pill diary.  Pills taken match pills returned.  Patient instructed to call with any questions or concerns.  Patient voiced understanding.      Prudence Jones RN     Pager [phone]

## 2017-08-15 NOTE — PROGRESS NOTES
Lab and now off study .  Reviewed and stable.  Tammy CISNEROS, RN, OCN  Care Coordinator   Gynecologic Cancer   Office [phone]  Pager [phone] #[phone]

## 2017-08-30 ENCOUNTER — MYC MEDICAL ADVICE (OUTPATIENT)
Dept: ONCOLOGY | Facility: CLINIC | Age: 57
End: 2017-08-30

## 2017-08-30 DIAGNOSIS — Z79.899 ENCOUNTER FOR LONG-TERM (CURRENT) USE OF MEDICATIONS: ICD-10-CM

## 2017-08-30 DIAGNOSIS — C79.9 METASTATIC CANCER (H): Primary | ICD-10-CM

## 2017-08-31 NOTE — TELEPHONE ENCOUNTER
Care Coordinator Note  Called to clarify appts starts with patient.  Will be starting on 9/22 every 4 weeks at the  and the [facility].        Patient verbalized back understanding of the above information discussed.   Tammy CISNEROS RN, OCN  Care Coordinator   Gynecologic Cancer   Office [phone]  Pager [phone] #[phone]

## 2017-09-14 ENCOUNTER — HOSPITAL ENCOUNTER (OUTPATIENT)
Dept: NUCLEAR MEDICINE | Facility: CLINIC | Age: 57
Setting detail: NUCLEAR MEDICINE
Discharge: HOME | End: 2017-09-14
Attending: OBSTETRICS & GYNECOLOGY | Admitting: OBSTETRICS & GYNECOLOGY
Payer: COMMERCIAL

## 2017-09-14 DIAGNOSIS — Z79.899 ENCOUNTER FOR LONG-TERM (CURRENT) USE OF MEDICATIONS: ICD-10-CM

## 2017-09-14 DIAGNOSIS — C79.9 METASTATIC CANCER (H): ICD-10-CM

## 2017-09-14 PROCEDURE — 78472 GATED HEART PLANAR SINGLE: CPT

## 2017-09-14 PROCEDURE — 34300033 ZZH RX 343: Performed by: OBSTETRICS & GYNECOLOGY

## 2017-09-14 PROCEDURE — A9560 TC99M LABELED RBC: HCPCS | Performed by: OBSTETRICS & GYNECOLOGY

## 2017-09-14 RX ADMIN — Medication 25.4 MILLICURIE: at 10:49

## 2017-09-21 RX ORDER — PROCHLORPERAZINE MALEATE 10 MG
10 TABLET ORAL EVERY 6 HOURS PRN
Qty: 30 TABLET | Refills: 2 | Status: SHIPPED | OUTPATIENT
Start: 2017-09-21 | End: 2018-10-23

## 2017-09-21 ASSESSMENT — ENCOUNTER SYMPTOMS
NECK MASS: 0
TREMORS: 0
MEMORY LOSS: 0
HALLUCINATIONS: 0
ARTHRALGIAS: 0
PANIC: 0
FATIGUE: 0
FLANK PAIN: 0
LEG PAIN: 0
EYE WATERING: 0
NIGHT SWEATS: 0
POOR WOUND HEALING: 0
MUSCLE CRAMPS: 0
SNORES LOUDLY: 0
EYE PAIN: 0
DISTURBANCES IN COORDINATION: 0
ABDOMINAL PAIN: 0
BOWEL INCONTINENCE: 0
EYE IRRITATION: 0
DIZZINESS: 0
INCREASED ENERGY: 0
CONSTIPATION: 0
SEIZURES: 0
TROUBLE SWALLOWING: 0
SLEEP DISTURBANCES DUE TO BREATHING: 0
STIFFNESS: 0
WEAKNESS: 0
NUMBNESS: 0
DYSURIA: 0
NECK PAIN: 0
EYE REDNESS: 0
CHILLS: 0
LIGHT-HEADEDNESS: 0
MYALGIAS: 0
BACK PAIN: 0
RESPIRATORY PAIN: 0
POSTURAL DYSPNEA: 0
TACHYCARDIA: 0
EXERCISE INTOLERANCE: 0
LOSS OF CONSCIOUSNESS: 0
DECREASED APPETITE: 0
SWOLLEN GLANDS: 0
POLYDIPSIA: 0
DOUBLE VISION: 0
JOINT SWELLING: 0
RECTAL BLEEDING: 0
HEARTBURN: 0
MUSCLE WEAKNESS: 0
DEPRESSION: 0
JAUNDICE: 0
SINUS CONGESTION: 0
CLAUDICATION: 0
DIFFICULTY URINATING: 0
HEADACHES: 0
RECTAL PAIN: 0
COUGH DISTURBING SLEEP: 0
HYPOTENSION: 0
WHEEZING: 0
HOT FLASHES: 0
TINGLING: 0
NAUSEA: 0
POLYPHAGIA: 0
BREAST PAIN: 0
HEMOPTYSIS: 0
BREAST MASS: 0
DIARRHEA: 1
BLOATING: 0
HOARSE VOICE: 0
SYNCOPE: 0
ORTHOPNEA: 0
BRUISES/BLEEDS EASILY: 0
NERVOUS/ANXIOUS: 1
SPUTUM PRODUCTION: 0
WEIGHT LOSS: 0
HEMATURIA: 0
VOMITING: 0
FEVER: 0
ALTERED TEMPERATURE REGULATION: 0
SPEECH CHANGE: 0
SHORTNESS OF BREATH: 0
EXTREMITY NUMBNESS: 0
DYSPNEA ON EXERTION: 0
SORE THROAT: 0
COUGH: 0
PARALYSIS: 0
SMELL DISTURBANCE: 0
DECREASED CONCENTRATION: 0
WEIGHT GAIN: 0
BLOOD IN STOOL: 0
TASTE DISTURBANCE: 0
SINUS PAIN: 0
PALPITATIONS: 0
HYPERTENSION: 0
LEG SWELLING: 0
DECREASED LIBIDO: 0
SKIN CHANGES: 0
NAIL CHANGES: 0
INSOMNIA: 1

## 2017-09-21 NOTE — PROGRESS NOTES
Gynecologic Oncology Follow-Up Note  RE: Odalys Nathan  MRN: [id number]  : 1960  Date of Visit: 2017    CC: Odalys Nathan is a 57 year old year old female with recurrent stage IIIC bilateral ovarian cancer who presents today for disease management with Doxil.    HPI: Odalys comes to the clinic accompanied by her  Marcos and daughter Ping. She is feeling well without concerns today. She continues on ferrous sulfate 325mg TID for history of anemia. She also continues with chronic diarrhea and tinnitus- denies need for intervention. She previously received Doxil/carboplatin in the past and has tolerated this fairly well but did have a rash after C5 Doxil that resolved with Benadryl, no hives or s/sxs anaphylaxis and received C6 Doxil without adverse effect. She is eating and drinking well, reports she is ready for chemotherapy today.      Brief Oncology History:  2012 - Admitted to hospital for 2 weeks of intermittent abdominal cramping, distention, diarrhea and N/V. CT of abdomen/pelvis significant for small bowel obstruction, a heterogenous soft tissue density in the pelvis, omental nodules, and ascites. Bilateral adnexal masses per U/S of pelvis. CA-125 was elevated at 987, CEA was normal at 1.0.    12 - Therapeutic paracentesis (4 L) with cytology confirming malignancy (CO positive, weak ER positive, CK-7 positive consistent with GYN primary). Surgery recommended d/t potential for falling blood counts 2/2 chemotherapy (patient is Yazdanism and limiting blood transfusion)    12 - Exploratory laparotomy, MAR BSO, lysis of adhesion, Appendectomy, Repair cystotomy, Omentectomy Isis-clstjj-hhhwbabqt-transverse-colon resection, Ileo-descending colon anastomosis, CUSA, & Colonoscopy (done by Dr. Amato and colorectal team).  2012 - Admitted to hospital for bilateral pulmonary emboli and drainage of pleural effusion. Started on Lovenox.  12-3/21/12: Cycle  #1-2 Carbo/Taxol IV  3/29/12 - Started on Keflex by her PCP for infection in her healing wound (immediately below her umbilicus).    4/11/12-6/14/12: Cycle #3-6 IV chemo, Cycle #1 IV/IP chemo  7/16/12 -  8, CT PRADEEP - enrolled in  - observation arm  3/4/13-6/28/13:  6, 9, 12, 15, 20.  7/1/13: CT Chest, Abdomen, Pelvis IMPRESSION:  1. Worsening metastatic ovarian carcinoma suggested by increased size of soft tissue nodules anterior to the right psoas muscle, that may represent growing mesenteric lymphadenopathy.  2. Remaining prominent right lower quadrant mesenteric lymph nodes are not significantly changed from CT 7/16/2012.  3. Clustered nodular opacities in the right lower lobe are not significant change from 7/16/2012 and remain indeterminate. Again, the appearance and distribution is suggestive of an infectious etiology.  4. Stable 8 mm soft tissue nodule in left breast, unchanged since at least 02/20/2012. This can be observed on followup studies, but correlation with mammography could be considered.  Decision made to start Doxil/Carbo.  7/11/13: The left ventricular ejection fraction is normal at 66.4%.  7/12/13-9/6/13: Cycle #1-3 Doxil/Carbo.  10, 11.    9/30/13 CT C/A/P Impression:  1. Overall, favorable response to treatment with decreasing size of soft tissue nodules tracking along the anterior aspect of the right psoas muscle.  2. 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  3. Improved cluster of right lower lobe pulmonary nodular opacities. These may represent resolving infection.  10/3/13-12/9/13:  9, 8, 10. Cycle 4-6 Doxil/Carbo.    1/13/14 CT C/A/P Impression:   1. Stable appearance of metastatic ovarian cancer. Scattered soft tissue nodules along the anterior aspect of the right psoas muscle are unchanged in size. Mild mesenteric lymphadenopathy is unchanged.  2. Clustered micronodules in the right lower lobe are unchanged from 9/30/2013, but improved from 7/1/2013.  This history suggests a postinflammatory/postinfectious etiology.  3. 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  1/16/14 Discussed multiple options for her based on relatively stable-appearing disease on CT but slight increase in  (which has had small increase to 20 with last recurrence) including chemo break with recheck of  in 1 month, starting new chemo agent immediately, and exploratory surgery with possible resection of nodules. She is considered platinum-sensitive based on > 1 year remission after Taxol/Carbo, which we will take into consideration for future chemo planning. I am not inclined to surgery at this time given difficulty she already has with diarrhea secondary to past colon resection. I suspect we would need to resect further bowel due to mesenteric disease. Also explained inherent risks of any major surgery. Also mentioned maintenance chemo, but this has not been shown to increase overall survival and would likely decrease her quality of life without significant benefit. Family is going on vacation to [place] in 2 weeks and Odalys does not want to have chemo prior to that, so will plan to take 1 month break. She can have  at that time (discussed checking toady since last draw was in early December, but as it would likely not change treatment plan and she has h/o slow rising , will not check today).  2/17/14  16    2/20/14: Decision to take break from chemo for two months, followed by CT and CA-125.    4/21/14  27  4/21/14 CT C/A/P Impression:    1. Increased size of 2 low-attenuation lymph nodes anterior to the right psoas muscle is concerning for worsening metastatic ovarian cancer.    2. New circumferential thickening of a 3.8 cm length segment of distal transverse colon is likely physiologic. Recommend attention on followup imaging.    3. Grossly unchanged size of clustered small nodules versus scarring in the right lower lobe the lungs.    4. Stable  "thrombosis of the right ovarian vein.  5/14/14: Diagnostic laparoscopy converted to exploratory laparotomy and removal of mesenteric masses, tumor debulking, peritoneal biopsies and intraperitoneal port placement. On laparoscopy, it was noted that there were small nodules on the anterior abdominal wall near the previous incision, small nodules on the right pelvic sidewall as well. Nodules were palpated in the mesentery; however, as it was unable to clarify where the origin of the nodules was, the decision was made to open the patient. On opening there was found to be approximately a 3 cm nodule in the small bowel mesentery and another separate approximately 2 cm nodule in the bowel mesentery. Pelvis without evidence of cancer, some mesenteric lymph nodes were palpated. No evidence otherwise of any disseminated cancer throughout the abdomen.    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 (2/2)  -Largest metastasis: 1.3 cm  -See comment  D: Peritoneum, right paracolic gutter #2, biopsy:  -Fibroadipose tissue with granulomatous inflammation surrounding refractile material  -Negative for malignancy   E: Small bowel adhesion, biopsy:  -Fibroconnective tissue, consistent with adhesion  -Negative for malignancy  F: Lymph nodes, mesentry, not otherwise specified, excision:  -Two lymph nodes, negative for metastatic carcinoma (0/2)  G: Lymph node, mesentery, \"#2\", excision:  -One lymph node, negative for metastatic carcinoma (0/1)  H: Lymph nodes, mesentery, \"nodule #2\", excision:  -Five lymph nodes, negative for metastatic carcinoma (0/5)  COMMENT:  Some of the specimens show " post-operative changes. Others show possible treatment related changes, including necrosis. The metastatic carcinoma in the mesenteric lymph nodes (specimen C) shows variable morphology, including relatively low grade tumor with papillary architecture, and high grade tumor comprised of nests of tumor cells with irregular, slit-like spaces and marked nuclear pleomorphism.    5/29/14: Cycle 1 IV PACLitaxel / IP CISplatin / IP PACLitaxel.  - 28.  6/26/14: Cycle #2 IV/IP.  10  8/5/14: CT chest/abd/pelvis IMPRESSION     1. In this patient with ovarian cancer, overall findings are indicative of stable/slight improvement, as multiple mesenteric lymphadenopathy and scattered nodular peritoneal soft tissue mass lesions appear unchanged or slightly smaller since 4/21/2014.    2. 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 Mild dilatation of the second and the third portion of the duodenum with a narrow SMA angle. This could represent SMA syndrome, if clinically correlated.17/14:    Cycle #3 IV/IP.  10.    CT chest/abd/pelvis with contrast on 8/5/14    Impression:    1. In this patient with ovarian cancer, overall findings are indicative of stable/slight improvement, as multiple mesenteric lymphadenopathy and scattered nodular peritoneal soft tissue mass lesions appear unchanged or slightly smaller since 4/21/2014.    2. 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 Mild dilatation of the second and the third portion of the duodenum with a narrow SMA angle. This could represent SMA syndrome, if clinically correlated.  8/7/14: Cycle #4 Taxol/Carbo (changed from IV/IP).  10. She has been feeling okay. She is unsure if she can finish out the course of 6 cycles IV/IP taxol/cisplatin. She feels like she has the flu for about a week then starts feeling gradually better after each chemo cycle. Her spouse notes that she actually has been more sick with the treatments than she  initially admits here. She was also previously having some rib pain. Denies any rib pain now. Denies any chest pain or shortness of breath.  Plan: discussed recent CT cap results and switching to just IV as she is feeling miserable with IP treatments. Switch to IV carbo/taxol as patient is platinum sensitive.  We also discussed her taking part of the tesaro trial, which would require BRCA testing. She would like to take part in this trial if eligible.  8/20/14: Remove Intraperitoneal Port ( Port and catheter intact - discarded)  8/28/14: Cycle #5 Taxol/Carbo held due to thrombocytopenia.  6.    She denies any vaginal bleeding, no changes in her bowel or bladder habits, no nausea/emesis, no lower extremity edema, and no difficulties eating or sleeping. She denies any abdominal discomfort/bloating, no fevers or chills, and no chest pain or shortness of breath. She states her diarrhea is the same. She reports some fatigue which improves about 1-2 weeks after her chemotherapy. She states she does not need any medication refills and she was told she does not meet the criteria for the TESARO trial. She states she has 3 bags of iv fluids left over from her previous chemotherapy and will give these to herself. She states she is ready for her treatment today.    9/29/14: Cycle #6 Taxol/Carbo  6. Insurance questions regarding GSF coverage today. No concerns other than fatigue. Taking iron for anemia and does not desire blood transfusion. Using neulasta for neutropenia. Using home IV hydration if needed. Baseline unchanged. No abdominal bloating, constipation, diarrhea, pain, vaginal or rectal bleeding, cough or dyspnea, fluid retention.    10/16/14: Impression:    1. Nodular peritoneal soft tissue mass in the right lower quadrant adjacent to the psoas muscle is no longer appreciated. Adjacent prominent lymphadenopathy is unchanged from previous exam. No new peritoneal lesions.    2. Unchanged chronic thrombosis of  the right ovarian vein.    10/20/14:  5. CT chest/abdomen/pelvis on 10/16/14 showed nodular peritoneal soft tissue mass in the right lower quadrant adjacent to the psoas muscle is no longer appreciated. Adjacent prominent lymphadenopathy is unchanged from previous exam. 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  1/27/15:  6.  4/28/15:  14.  5/26/15:  18.  6/2/15: CT cap Impression:  1. Postsurgical changes of hysterectomy and bilateral salpingo-oophorectomy for ovarian cancer. There is a new 8 mm hazy, ill-defined hypoattenuating lesion in hepatic segment 6 which is suspicious for a metastatic deposit. Further evaluation with ultrasound in recommended.    2. Increased size of a left retroperitoneal lymph node which is indeterminate but may represent a ciro metastasis. Mildly prominent lymph nodes in the right lower quadrant are not significantly changed.  3. Moderate colonic stool burden.    6/4/15: US abdomen IMPRESSION:    Hyperechoic lesion in the right hepatic lobe, consistent with hemangioma. This does not corresponds to the area of the lesion seen on CT from 6/2/2015. An MR would be helpful for identifying and characterizing the lesion from the recent CT.  6/12/15: MR abd IMPRESSION:  1. New 20 x 11 mm enhancing lesions between the right obliques, concerning for metastatic disease. This lesions should be amenable to percutaneous biopsy, if indicated.  2. Correlating to the lesion visualized on comparison CT is a hepatic segment 6 subcapsular 7 mm lesion. Overall the appearance favors the diagnosis of a simple cyst. However, there is faint suggestion of mild peripheral arterial enhancement. Although this is favored as  artifactual, this should be followed up to confirm stability. Recommend 6 month followup.  3. Hepatic segment 6, 5 mm lesion too small to technically characterize. Differential would favor FNH, less likely flash filling  hemangioma. Recommend attention on followup.  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     9/1/15: Cycle #1 Avastin/Cytoxan.   31.     9/24/15:feeling generally well. She says she has been having back and stomach spasms. She is taking cytosine daily (she ran out yesterday). She also says its affecting her voice. Admits that it burns occasionally. She is eating and drinking normal. She also admit diarrhea, 5-7 times daily, lose/watery. She trying to stay hydrated and eat fiber. She also says that her body is sore, especially the bottom of her feet. Her blood pressure is normal. She also admits having headache after her first infusion.      9/24/15: Cycle #2 Avastin/Cytoxan.  19.  10/15/15: Cycle #3 Avastin/Cytoxan.  16.     11/6/15: CT c/a/p IMPRESSION:    1. Stable postoperative change of MAR/BSO for ovarian cancer.  2. The lesion in the right flank abdominal musculature is slightly decreased in size. Otherwise, stable examination.  2. No evidence of metastatic disease in the chest.    11/20/15: Treatment planning visit,  16    11/25/15: surgical pathology report  FINAL DIAGNOSIS:  Soft tissue, right oblique muscle mass, excision:  -Recurrent ovarian serous carcinoma  -Carcinoma is present less than 1 mm from one resection margin  -Background skeletal muscle and fibroadipose tissue    1/4/16:  22  1/11/16-1/27/16: Radiation to right flank x 12 treatments  4/7/2016:  94. CT cap IMPRESSION:    In this patient with ovarian cancer status post MAR/BSO and descending/transverse colectomy:  1. No evidence for malignancy in the chest, abdomen, or pelvis.  2. Stable small hypodense segment 6 liver lesion, appears more likely benign, possibly a cyst.  5/13/16:  124.  6/3/16: PET CT IMPRESSION: In this patient with a history of ovarian cancer:  1.  Hypermetabolic and enlarging periaortic and perihepatic lymphadenopathy compatible with metastatic disease, as detailed above.  2. Although hypodense lesion in hepatic segment 6 has been present since 6/2/2015 associated hypermetabolism makes this lesion highly concerning for metastatic disease.    Plan: to start Niraparib under TESARO study.     6/9/16:  137.  6/14/16: Cycle #1 Niraparib.    6/28/16: Cycle #1 D15 Niraparib.    7/5/16:  100.    7/11/16: Cycle #2 Nraparib.   83.    8/3/2016: PET CT IMPRESSION:    In this patient with known history of ovarian cancer:  1) New pleural based nodular opacities in the lateral and inferior aspects of the bilateral lower lobes, worse in the left lung. Likely infection. Close follow up is recommended.      2) Slight decrease in hypermetabolic abdominal lymphadenopathy. 2 hypermetabolic lymph nodes persist.  3) Unchanged right hepatic lobe metastatic lesion.     8/9/16: Cycle #3 Niraparib.  69.  9/6/16: Cycle #4 Niraparib.  53. Dose held due to anemia.  9/13/16: Eval for potential cycle 4 niraparib. Dose held due to anemia.  10/4/16: CT CAP impression:  IMPRESSION: In this patient with a known history of ovarian cancer:  1. There has been interval resolution of pleural-based nodular  opacities which likely represented infection.  2. Abdominal lymphadenopathy in the form of 2 portacaval lymph nodes  have not significantly changed in size, noted to be hypermetabolic on  prior PET/CT.  3. Previously demonstrated metastatic lesion in the right lobe of the  liver is not significantly changed.  10/11/16:  60  11/1/16: C6 niraparib,  75  11/29/16: C7 niraparib.  78. CT CAP impression as follows:  Target lesions (RECIST criteria):       A previously described target lesion superior to the head of the  pancreas (series 2, image 64)  (referred to as a perihepatic node on  6/3/2016) may not be a valid target lesion because it measured  less  than 1.5 cm originally. However, this particular node has decreased in  size, now measuring 7 mm in short axis versus 14 mm on 6/3/2016 when  measured in a similar fashion.       2.3 cm short axis portacaval lymph node on series 2 image 67,  previously 2.0 cm on 10/4/2016.       1.2 cm subtle hypodensity in hepatic segment 6 on series 2 image 75,  stable on multiple studies since at least 6/3/2016     Sum of diameters today: 3.5 cm. Sum of diameters 10/4/2016: 3.2 cm.  Growth = 9%.    12/27/16: C8 niraparib.  105.  1/25/17: C9 niraparib.  108.  2/23/17: C10 niraparib.  132.   CT CAP impression:  Sum of target lesion diameters today: 3.7 cm. Sum of target lesion  diameters on 11/28/2016: 3.5 cm. Growth= 6%  1. In this patient with history of ovarian cancer there is stable  disease by RECIST criteria as evidenced by:   1a. Mildly increased size of liver metastasis.  1b. Stable portacaval lymphadenopathy.  1c. No evidence of metastatic disease in the chest.  2. Trace emphysematous changes of the lungs.  3/22/17: C11 niraparib.  132.  4/19/17: C12 niraparib.  127.  5/16/17: CT CAP IMPRESSION: In this patient with ovarian cancer:  1. Mildly increased size of hepatic metastasis segment 6 with subtle increased capsular retraction.  2. Stable edmundo hepatis nodes, with mild increase in size of periaortic lymph nodes.  3. Prominent left supraclavicular lymph node with subtle increase in size compared to prior studies, particularly comparing to 10/4/2016.  4. Mild subtle groundglass opacities in the right upper lobe, not present on prior study, lesser extent in the right lower lobe.  Findings may represent infection, additional consideration is malignancy (less likely), and attention on follow-up study  Recommended.  Addendum:   Prominent left supraclavicular lymph node (3/25) is stable from most recent CT performed 2/20/2017, currently measuring 14 x 16 mm, previously 14 x 16 mm on  2/20/2017.      The portal caval lymph node/edmundo hepatis lymph node is stable from 2/20/2017, measuring 21 mm.      Clarification of size of the para-aortic lymph node (series 3 image 327). It short axis measurement is 11 mm versus 9 mm on prior study.      Hepatic segment 6 triangular-shaped low density lesion (3/362) measures 14 mm, previously measured 12 mm, demonstrating a  possible/questionable minimal subtle increase in size. Similarly the lymph nodes noted above in the supraclavicular and para-aortic regions demonstrate possible minimal possible subtle increase in size.      5/18/17: Cycle #13 Niraparib.  191.  6/15/17: Cycle #14 Niraparib.  146.  7/13/17: Cycle #15 Niraparib 200 mg.  171     CT (8/9/17):       IMPRESSION:  1. Segment 6 hepatic metastasis is stable to minimally increased in  size.  2. Slight increase in multicentric adenopathy, most pronounced at the  edmundo hepatis. Additional sites include the inferior left  neck/supraclavicular region and retroperitoneum which appear stable to  minimally increased.      8/10/17:  175  9/14/17: MUGA LVEF 54%      Past Medical History:   Diagnosis Date     Antiplatelet or antithrombotic long-term use      Ascites      Blood clot in the legs      Diabetes (H)      Ovarian cancer (H)     serous,stg IV     Pleural effusion      Pulmonary embolism (H) 2/2012     Refusal of blood transfusions as patient is Jew      Short gut syndrome      Subclinical hypothyroidism 4/18/2013     Thrombosis of leg        Past Surgical History:   Procedure Laterality Date     COLECTOMY       COLONOSCOPY  2/1/2012    Procedure:COLONOSCOPY; With Biopsy; Surgeon:TONI SULLIVAN; Location:[facility]     HYSTERECTOMY TOTAL ABD, RHIANNA SALPINGO-OOPHORECTOMY, NODE DISSECTION, TUMOR DEBULKING, COMBINED  2/1/2012    Procedure:COMBINED HYSTERECTOMY TOTAL ABDOMINAL, BILATERAL SALPINGO-OOPHORECTOMY, NODE DISSECTION, TUMOR DEBULKING;  Exploratory Laparotomy, Total  Abdominal Hysterectomy, Bilateral Salpingo-Oophorectomy, appendectomy,lysis of adhesions, ileal, ascending, transverse and splenic flexure resection, ileal descending bowel renanastomosis, incidental cystotomy repair, CUSA procedure and colonoscopy ; [facility]     INSERT PORT PERITONEAL ACCESS  4/3/2012    Procedure:INSERT PORT PERITONEAL ACCESS; Intraperitoneal Port Placement (c-arm); Surgeon:SAMUEL CARRASCO; Location:[facility]     INSERT PORT PERITONEAL ACCESS  5/14/2014    Procedure: INSERT PORT PERITONEAL ACCESS;  Surgeon: Nga Yeung MD;  Location: [facility] OR     INSERT PORT VASCULAR ACCESS       LAPAROSCOPY DIAGNOSTIC (GYN)  5/14/2014    Procedure: LAPAROSCOPY DIAGNOSTIC (GYN);  Surgeon: Nga Yeung MD;  Location: [facility]     LAPAROTOMY EXPLORATORY Right 11/25/2015    Procedure: LAPAROTOMY EXPLORATORY;  Surgeon: Nga Yeung MD;  Location: [facility]     LAPAROTOMY, TUMOR DEBULKING, COMBINED  5/14/2014    Procedure: COMBINED LAPAROTOMY, TUMOR DEBULKING;  Surgeon: Nga Yeung MD;  Location: [facility]     REMOVE CATHETER PERITONEAL N/A 8/20/2014    Procedure: REMOVE CATHETER PERITONEAL;  Surgeon: Nga Yeung MD;  Location: [facility] OR     VASCULAR SURGERY      stent left iliac vein       Current Outpatient Prescriptions   Medication     prochlorperazine (COMPAZINE) 10 MG tablet     ferrous sulfate (IRON) 325 (65 FE) MG tablet     LEVOTHYROXINE SODIUM PO     order for DME     LORazepam (ATIVAN) 1 MG tablet     METFORMIN HCL PO     diphenoxylate-atropine (LOMOTIL) 2.5-0.025 MG per tablet     cyanocobalamin (VITAMIN B12) 1000 MCG/ML injection     magnesium oxide (MAG-OX) 400 MG tablet     ASPIRIN PO     potassium chloride (KLOR-CON) 20 MEQ packet     VITAMIN E NATURAL PO     Cholecalciferol (VITAMIN D PO)     HERBALS     Ascorbic Acid (VITAMIN C PO)     Calcium Carbonate-Vitamin D (CALCIUM + D PO)     No current facility-administered medications for this visit.      Facility-Administered  Medications Ordered in Other Visits   Medication     heparin 100 UNIT/ML injection 500 Units          Allergies   Allergen Reactions     Nkda [No Known Drug Allergies]        Family History   Problem Relation Age of Onset     CANCER Mother 69     lung, smoker     CANCER Maternal Uncle 65     brain     Colon Cancer Maternal Aunt 80     colon       Social History     Social History     Marital status:      Spouse name: N/A     Number of children: N/A     Years of education: N/A     Occupational History     Not on file.     Social History Main Topics     Smoking status: Former Smoker     Years: 8.00     Types: Cigarettes     Quit date: 6/21/1980     Smokeless tobacco: Never Used      Comment: started at 11 yo and quit at 16 yo     Alcohol use Yes      Comment: 3x/day wine or jodi     Drug use: No     Sexual activity: Not on file     Other Topics Concern     Not on file     Social History Narrative       Review of Systems     Constitutional:  Negative for fever, chills, weight loss, weight gain, fatigue, decreased appetite, night sweats, recent stressors, height gain, height loss, post-operative complications, incisional pain, hallucinations, increased energy, hyperactivity and confused.   HENT:  Positive for tinnitus. Negative for ear pain, hearing loss, nosebleeds, trouble swallowing, hoarse voice, mouth sores, sore throat, ear discharge, tooth pain, gum tenderness, taste disturbance, smell disturbance, hearing aid, bleeding gums, dry mouth, sinus pain, sinus congestion and neck mass.    Eyes:  Negative for double vision, pain, redness, eye pain, decreased vision, eye watering, eye bulging, eye dryness, flashing lights, spots, floaters, strabismus, tunnel vision, jaundice and eye irritation.   Respiratory:   Negative for cough, hemoptysis, sputum production, shortness of breath, wheezing, sleep disturbances due to breathing, snores loudly, respiratory pain, dyspnea on exertion, cough disturbing sleep and  postural dyspnea.    Cardiovascular:  Negative for chest pain, dyspnea on exertion, palpitations, orthopnea, claudication, leg swelling, fingers/toes turn blue, hypertension, hypotension, syncope, history of heart murmur, chest pain on exertion, chest pain at rest, pacemaker, few scattered varicosities, leg pain, sleep disturbances due to breathing, tachycardia, light-headedness, exercise intolerance and edema.   Gastrointestinal:  Positive for diarrhea. Negative for heartburn, nausea, vomiting, abdominal pain, constipation, blood in stool, melena, rectal pain, bloating, hemorrhoids, bowel incontinence, jaundice, rectal bleeding, coffee ground emesis and change in stool.   Genitourinary:  Negative for bladder incontinence, dysuria, urgency, hematuria, flank pain, vaginal discharge, difficulty urinating, genital sores, dyspareunia, decreased libido, nocturia, voiding less frequently, arousal difficulty, abnormal vaginal bleeding, excessive menstruation, menstrual changes, hot flashes, vaginal dryness and postmenopausal bleeding.   Musculoskeletal:  Negative for myalgias, back pain, joint swelling, arthralgias, stiffness, muscle cramps, neck pain, bone pain, muscle weakness and fracture.   Skin:  Negative for nail changes, itching, poor wound healing, rash, hair changes, skin changes, acne, warts, poor wound healing, scarring, flaky skin, Raynaud's phenomenon, sensitivity to sunlight and skin thickening.   Neurological:  Negative for dizziness, tingling, tremors, speech change, seizures, loss of consciousness, weakness, light-headedness, numbness, headaches, disturbances in coordination, extremity numbness, memory loss, difficulty walking and paralysis.   Endo/Heme:  Negative for anemia, swollen glands and bruises/bleeds easily.   Psychiatric/Behavioral:  Negative for depression, hallucinations, memory loss, decreased concentration, mood swings and panic attacks.    Breast:  Negative for breast discharge, breast mass,  "breast pain and nipple retraction.   Endocrine:  Negative for altered temperature regulation, polyphagia, polydipsia, unwanted hair growth and change in facial hair.        Physical Exam:    /65 (BP Location: Right arm, Patient Position: Sitting, Cuff Size: Adult Regular)  Pulse 94  Temp 98.1  F (36.7  C) (Oral)  Resp 16  Ht 1.651 m (5' 5\")  Wt 62.7 kg (138 lb 4.8 oz)  SpO2 99%  Breastfeeding? No  BMI 23.01 kg/m2    General: Alert non-toxic appearing female in no acute distress  HEENT: Normocephalic, atraumatic; PERRLA; no scleral icterus; oropharynx pink without lesions; neck supple without lymphadenopathy  Pulmonary: Lungs clear to auscultation, no increased work of breathing noted  Cardiac: Regular rate and rhythm, S1S2, no clicks, murmurs, rubs, or gallops; bilateral lower extremities without edema  GI: Normoactive bowel sounds x4 quadrants, abdomen soft, non-distended, and non-tender to palpation without masses or organomegaly  : Not indicated  Heme: Cervical, supraclavicular, and inguinal nodes without lymphadenopathy  MSK: Moves all extremities, no obvious muscle wasting  Neuro: No gross deficits, normal gait  Skin: Appropriate color for race, warm and dry, no rashes or lesions to unclothed skin  Psych: Pleasant and interactive, affect bright, makes appropriate eye contact, thought process linear    Labs:      9/22/2017  Day 1   Hemoglobin 11.7 - 15.7 g/dL 12.3   Hematocrit 35.0 - 47.0 % 37.7   Platelet Count 150 - 450 10e9/L 251   Absolute Neutrophil 1.6 - 8.3 10e9/L 2.9   Sodium 133 - 144 mmol/L 139   Potassium 3.4 - 5.3 mmol/L 3.6   Chloride 94 - 109 mmol/L 106   Carbon Dioxide 20 - 32 mmol/L 26   Urea Nitrogen 7 - 30 mg/dL 18   Creatinine 0.52 - 1.04 mg/dL 0.75   Calcium 8.5 - 10.1 mg/dL 8.8   Magnesium 1.6 - 2.3 mg/dL 1.6   Bilirubin Total 0.2 - 1.3 mg/dL 0.5   ALT 0 - 50 U/L 23   AST 0 - 45 U/L 21   Alkaline Phosphatase 40 - 150 U/L 101   Albumin 3.4 - 5.0 g/dL 3.4   Protein Total 6.8 - " 8.8 g/dL 7.2   WBC 4.0 - 11.0 10e9/L 5.8    pending      Assessment/Plan:  1) Recurrent stage IIIC bilateral ovarian cancer: Currently feeling well. Proceed with C1D1 carboplatin/Doxil as ordered by Dr. Yeung. Reviewed risks of reaction and s/sxs reaction and when to call her nurse/seek further care. Discussed Doxil precautions. Lorazepam refilled for anxiety/sleep/nausea. Reviewed signs and symptoms for when she should contact the clinic or seek additional care, including but not limited to fever, chills, inability to keep down food or fluids, nausea and vomiting not controlled with antiemetics, and diarrhea leading to dehydration. Patient to contact the clinic with any questions or concerns in the interim. Tammy Flores RN in for teaching.   2) Genetic counseling: Testing has been performed and she is negative for mutations in BRCA1, BRCA2, EPCAM, MLH1, MSH2, MSH6, PMS2, PTEN, and TP53 genes  3) Health maintenance issues discussed include to continue following up with PCP for non-gynecologic concerns.  4) Patient verbalized understanding of and agreement with plan    A total of 20 minutes spent with patient, over 50% spent in counseling and coordination of care.    HAILE De Paz, FNP-C  Family Nurse Practitioner  Gynecologic Oncology  [facility]  Pager: [phone]

## 2017-09-22 ENCOUNTER — ONCOLOGY VISIT (OUTPATIENT)
Dept: ONCOLOGY | Facility: CLINIC | Age: 57
End: 2017-09-22
Attending: NURSE PRACTITIONER
Payer: COMMERCIAL

## 2017-09-22 ENCOUNTER — APPOINTMENT (OUTPATIENT)
Dept: LAB | Facility: CLINIC | Age: 57
End: 2017-09-22
Attending: OBSTETRICS & GYNECOLOGY
Payer: COMMERCIAL

## 2017-09-22 ENCOUNTER — INFUSION THERAPY VISIT (OUTPATIENT)
Dept: ONCOLOGY | Facility: CLINIC | Age: 57
End: 2017-09-22
Attending: OBSTETRICS & GYNECOLOGY
Payer: COMMERCIAL

## 2017-09-22 VITALS
TEMPERATURE: 98.1 F | RESPIRATION RATE: 16 BRPM | HEART RATE: 94 BPM | WEIGHT: 138.3 LBS | DIASTOLIC BLOOD PRESSURE: 65 MMHG | OXYGEN SATURATION: 99 % | BODY MASS INDEX: 23.04 KG/M2 | SYSTOLIC BLOOD PRESSURE: 120 MMHG | HEIGHT: 65 IN

## 2017-09-22 DIAGNOSIS — Z51.11 ENCOUNTER FOR ANTINEOPLASTIC CHEMOTHERAPY: ICD-10-CM

## 2017-09-22 DIAGNOSIS — D70.2 DRUG-INDUCED NEUTROPENIA (H): ICD-10-CM

## 2017-09-22 DIAGNOSIS — Z79.899 ENCOUNTER FOR LONG-TERM (CURRENT) USE OF MEDICATIONS: Primary | ICD-10-CM

## 2017-09-22 DIAGNOSIS — C56.9 OVARIAN CANCER, UNSPECIFIED LATERALITY (H): ICD-10-CM

## 2017-09-22 DIAGNOSIS — C56.1 OVARIAN CANCER, RIGHT (H): ICD-10-CM

## 2017-09-22 DIAGNOSIS — C56.2 OVARIAN CANCER, LEFT (H): ICD-10-CM

## 2017-09-22 DIAGNOSIS — C56.1 OVARIAN CANCER, RIGHT (H): Primary | ICD-10-CM

## 2017-09-22 LAB
ALBUMIN SERPL-MCNC: 3.4 G/DL (ref 3.4–5)
ALP SERPL-CCNC: 101 U/L (ref 40–150)
ALT SERPL W P-5'-P-CCNC: 23 U/L (ref 0–50)
ANION GAP SERPL CALCULATED.3IONS-SCNC: 6 MMOL/L (ref 3–14)
AST SERPL W P-5'-P-CCNC: 21 U/L (ref 0–45)
BASOPHILS # BLD AUTO: 0 10E9/L (ref 0–0.2)
BASOPHILS NFR BLD AUTO: 0.5 %
BILIRUB SERPL-MCNC: 0.5 MG/DL (ref 0.2–1.3)
BUN SERPL-MCNC: 18 MG/DL (ref 7–30)
CALCIUM SERPL-MCNC: 8.8 MG/DL (ref 8.5–10.1)
CANCER AG125 SERPL-ACNC: 187 U/ML (ref 0–30)
CHLORIDE SERPL-SCNC: 106 MMOL/L (ref 94–109)
CO2 SERPL-SCNC: 26 MMOL/L (ref 20–32)
CREAT SERPL-MCNC: 0.75 MG/DL (ref 0.52–1.04)
DIFFERENTIAL METHOD BLD: ABNORMAL
EOSINOPHIL # BLD AUTO: 0.1 10E9/L (ref 0–0.7)
EOSINOPHIL NFR BLD AUTO: 2.2 %
ERYTHROCYTE [DISTWIDTH] IN BLOOD BY AUTOMATED COUNT: 12.3 % (ref 10–15)
GFR SERPL CREATININE-BSD FRML MDRD: 79 ML/MIN/1.7M2
GLUCOSE SERPL-MCNC: 111 MG/DL (ref 70–99)
HCT VFR BLD AUTO: 37.7 % (ref 35–47)
HGB BLD-MCNC: 12.3 G/DL (ref 11.7–15.7)
IMM GRANULOCYTES # BLD: 0 10E9/L (ref 0–0.4)
IMM GRANULOCYTES NFR BLD: 0.5 %
LYMPHOCYTES # BLD AUTO: 2.1 10E9/L (ref 0.8–5.3)
LYMPHOCYTES NFR BLD AUTO: 36.8 %
MAGNESIUM SERPL-MCNC: 1.6 MG/DL (ref 1.6–2.3)
MCH RBC QN AUTO: 34.2 PG (ref 26.5–33)
MCHC RBC AUTO-ENTMCNC: 32.6 G/DL (ref 31.5–36.5)
MCV RBC AUTO: 105 FL (ref 78–100)
MONOCYTES # BLD AUTO: 0.5 10E9/L (ref 0–1.3)
MONOCYTES NFR BLD AUTO: 9.3 %
NEUTROPHILS # BLD AUTO: 2.9 10E9/L (ref 1.6–8.3)
NEUTROPHILS NFR BLD AUTO: 50.7 %
NRBC # BLD AUTO: 0 10*3/UL
NRBC BLD AUTO-RTO: 0 /100
PLATELET # BLD AUTO: 251 10E9/L (ref 150–450)
POTASSIUM SERPL-SCNC: 3.6 MMOL/L (ref 3.4–5.3)
PROT SERPL-MCNC: 7.2 G/DL (ref 6.8–8.8)
RBC # BLD AUTO: 3.6 10E12/L (ref 3.8–5.2)
SODIUM SERPL-SCNC: 139 MMOL/L (ref 133–144)
WBC # BLD AUTO: 5.8 10E9/L (ref 4–11)

## 2017-09-22 PROCEDURE — 85025 COMPLETE CBC W/AUTO DIFF WBC: CPT | Performed by: NURSE PRACTITIONER

## 2017-09-22 PROCEDURE — 25000128 H RX IP 250 OP 636: Mod: ZF | Performed by: OBSTETRICS & GYNECOLOGY

## 2017-09-22 PROCEDURE — 99212 OFFICE O/P EST SF 10 MIN: CPT | Mod: ZF

## 2017-09-22 PROCEDURE — 96375 TX/PRO/DX INJ NEW DRUG ADDON: CPT

## 2017-09-22 PROCEDURE — 96417 CHEMO IV INFUS EACH ADDL SEQ: CPT

## 2017-09-22 PROCEDURE — 25000128 H RX IP 250 OP 636: Mod: ZF | Performed by: NURSE PRACTITIONER

## 2017-09-22 PROCEDURE — 86304 IMMUNOASSAY TUMOR CA 125: CPT | Performed by: NURSE PRACTITIONER

## 2017-09-22 PROCEDURE — 83735 ASSAY OF MAGNESIUM: CPT | Performed by: NURSE PRACTITIONER

## 2017-09-22 PROCEDURE — 99213 OFFICE O/P EST LOW 20 MIN: CPT | Mod: ZP | Performed by: NURSE PRACTITIONER

## 2017-09-22 PROCEDURE — 96413 CHEMO IV INFUSION 1 HR: CPT

## 2017-09-22 PROCEDURE — 80053 COMPREHEN METABOLIC PANEL: CPT | Performed by: NURSE PRACTITIONER

## 2017-09-22 RX ORDER — SODIUM CHLORIDE 9 MG/ML
1000 INJECTION, SOLUTION INTRAVENOUS CONTINUOUS PRN
Status: CANCELLED
Start: 2017-09-22

## 2017-09-22 RX ORDER — METHYLPREDNISOLONE SODIUM SUCCINATE 125 MG/2ML
125 INJECTION, POWDER, LYOPHILIZED, FOR SOLUTION INTRAMUSCULAR; INTRAVENOUS
Status: CANCELLED
Start: 2017-09-22

## 2017-09-22 RX ORDER — MEPERIDINE HYDROCHLORIDE 25 MG/ML
25 INJECTION INTRAMUSCULAR; INTRAVENOUS; SUBCUTANEOUS EVERY 30 MIN PRN
Status: CANCELLED | OUTPATIENT
Start: 2017-09-22

## 2017-09-22 RX ORDER — ALBUTEROL SULFATE 90 UG/1
1-2 AEROSOL, METERED RESPIRATORY (INHALATION)
Status: CANCELLED
Start: 2017-09-22

## 2017-09-22 RX ORDER — EPINEPHRINE 0.3 MG/.3ML
0.3 INJECTION SUBCUTANEOUS EVERY 5 MIN PRN
Status: CANCELLED | OUTPATIENT
Start: 2017-09-22

## 2017-09-22 RX ORDER — HEPARIN SODIUM (PORCINE) LOCK FLUSH IV SOLN 100 UNIT/ML 100 UNIT/ML
5 SOLUTION INTRAVENOUS
Status: COMPLETED | OUTPATIENT
Start: 2017-09-22 | End: 2017-09-22

## 2017-09-22 RX ORDER — ALBUTEROL SULFATE 0.83 MG/ML
2.5 SOLUTION RESPIRATORY (INHALATION)
Status: CANCELLED | OUTPATIENT
Start: 2017-09-22

## 2017-09-22 RX ORDER — DIPHENHYDRAMINE HYDROCHLORIDE 50 MG/ML
50 INJECTION INTRAMUSCULAR; INTRAVENOUS
Status: CANCELLED
Start: 2017-09-22

## 2017-09-22 RX ORDER — HEPARIN SODIUM (PORCINE) LOCK FLUSH IV SOLN 100 UNIT/ML 100 UNIT/ML
500 SOLUTION INTRAVENOUS EVERY 8 HOURS
Status: DISCONTINUED | OUTPATIENT
Start: 2017-09-22 | End: 2017-09-22 | Stop reason: HOSPADM

## 2017-09-22 RX ORDER — LORAZEPAM 1 MG/1
1 TABLET ORAL EVERY 6 HOURS PRN
Qty: 30 TABLET | Refills: 2 | Status: SHIPPED | OUTPATIENT
Start: 2017-09-22

## 2017-09-22 RX ORDER — EPINEPHRINE 1 MG/ML
0.3 INJECTION INTRAMUSCULAR; INTRAVENOUS; SUBCUTANEOUS EVERY 5 MIN PRN
Status: CANCELLED | OUTPATIENT
Start: 2017-09-22

## 2017-09-22 RX ORDER — EPINEPHRINE 0.3 MG/.3ML
INJECTION SUBCUTANEOUS
Status: DISCONTINUED
Start: 2017-09-22 | End: 2017-09-22 | Stop reason: WASHOUT

## 2017-09-22 RX ORDER — LORAZEPAM 2 MG/ML
1 INJECTION INTRAMUSCULAR EVERY 6 HOURS PRN
Status: CANCELLED
Start: 2017-09-22

## 2017-09-22 RX ADMIN — DOXORUBICIN HYDROCHLORIDE 50 MG: 2 INJECTABLE, LIPOSOMAL INTRAVENOUS at 13:21

## 2017-09-22 RX ADMIN — SODIUM CHLORIDE, PRESERVATIVE FREE 5 ML: 5 INJECTION INTRAVENOUS at 09:33

## 2017-09-22 RX ADMIN — DEXTROSE MONOHYDRATE 250 ML: 50 INJECTION, SOLUTION INTRAVENOUS at 12:40

## 2017-09-22 RX ADMIN — DEXAMETHASONE SODIUM PHOSPHATE: 10 INJECTION, SOLUTION INTRAMUSCULAR; INTRAVENOUS at 12:40

## 2017-09-22 RX ADMIN — CARBOPLATIN 525 MG: 10 INJECTION, SOLUTION INTRAVENOUS at 14:44

## 2017-09-22 RX ADMIN — SODIUM CHLORIDE, PRESERVATIVE FREE 500 UNITS: 5 INJECTION INTRAVENOUS at 15:59

## 2017-09-22 ASSESSMENT — PAIN SCALES - GENERAL: PAINLEVEL: NO PAIN (0)

## 2017-09-22 NOTE — NURSING NOTE
"Oncology Rooming Note    September 22, 2017 10:25 AM   Odalys Nathan is a 57 year old female who presents for:    Chief Complaint   Patient presents with     Port Draw     port accessed and labs drawn by rn.  vs taken.     Oncology Clinic Visit     return patient visit for pre-chemo follow up related to Ovarian cancer, right (H)     Initial Vitals: /65 (BP Location: Right arm, Patient Position: Sitting, Cuff Size: Adult Regular)  Pulse 94  Temp 98.1  F (36.7  C) (Oral)  Resp 16  Ht 1.651 m (5' 5\")  Wt 62.7 kg (138 lb 4.8 oz)  SpO2 99%  Breastfeeding? No  BMI 23.01 kg/m2 Estimated body mass index is 23.01 kg/(m^2) as calculated from the following:    Height as of this encounter: 1.651 m (5' 5\").    Weight as of this encounter: 62.7 kg (138 lb 4.8 oz). Body surface area is 1.7 meters squared.  No Pain (0) Comment: Data Unavailable   No LMP recorded. Patient has had a hysterectomy.  Allergies reviewed: Yes  Medications reviewed: Yes    Medications: MEDICATION REFILLS NEEDED TODAY. Provider was notified.  Pharmacy name entered into [employer]:    [id number] - [place], [place] - [address]  [place] [facility] - [place], [place] - [address]  [facility] - [place], [place] - [address] [facility] [place], [place] - [phone]    Clinical concerns: no concerns jose was notified.    5 minutes for nursing intake (face to face time)     Dre Perdomo CMA                    "

## 2017-09-22 NOTE — NURSING NOTE
"Chief Complaint   Patient presents with     Port Draw     port accessed and labs drawn by rn.  vs taken.     Port accessed with 20g 3/4\" gripper needle, labs drawn, port flushed with saline and heparin, vitals checked, pt checked in for next appointment.  Betzy Posadas RN    "

## 2017-09-22 NOTE — PATIENT INSTRUCTIONS
Contact Numbers    [facility] Main Line: [phone]  [facility] Triage:  [phone]    Call triage with chills and/or temperature greater than or equal to 100.5, uncontrolled nausea/vomiting, diarrhea, constipation, dizziness, shortness of breath, chest pain, bleeding, unexplained bruising, or any new/concerning symptoms, questions/concerns.     If you are having any concerning symptoms or wish to speak to a provider before your next infusion visit, please call your care coordinator or triage to notify them so we can adequately serve you.       After Hours: [phone]    If after hours, weekends, or holidays, call main hospital  and ask for Oncology doctor on call.         September 2017 Sunday Monday Tuesday Wednesday Thursday Friday Saturday                            1     2       3     4     5     6     7     8     9       10     11     12     13     14     [facility] HEART MUGA REST   10:00 AM   (60 min.)   [id number]   [facility] 15     16       17     18     19     20     21     22     [facility] DRAW    9:45 AM   (15 min.)    [facility] DRAW   [facility] Lab Draw     [facility] RETURN ACTIVE TREATMENT   10:05 AM   (40 min.)   Patricia Rocha APRN CNP   [facility] ONC INFUSION 240   11:30 AM   (240 min.)   [facility]   [facility] 23       24     25     26     27     28     29     30 October 2017 Sunday Monday Tuesday Wednesday Thursday Friday Saturday   1     2     3     4     5     6     7       8     9     10     11     12     13     14       15     16     17     18     19     20     [facility] DRAW    9:45 AM   (15 min.)    [facility] DRAW   [facility] Lab Draw     [facility] RETURN ACTIVE TREATMENT   10:05 AM   (40 min.)   Patricia Rocha APRN CNP   [facility] ONC INFUSION 240   11:30 AM   (240 min.)   [facility]   [facility] 21 22      23     24     25     26     27     28       29     30     31                                     Recent Results (from the past 24 hour(s))   CBC with platelets differential    Collection Time: 09/22/17  9:39 AM   Result Value Ref Range    WBC 5.8 4.0 - 11.0 10e9/L    RBC Count 3.60 (L) 3.8 - 5.2 10e12/L    Hemoglobin 12.3 11.7 - 15.7 g/dL    Hematocrit 37.7 35.0 - 47.0 %     (H) 78 - 100 fl    MCH 34.2 (H) 26.5 - 33.0 pg    MCHC 32.6 31.5 - 36.5 g/dL    RDW 12.3 10.0 - 15.0 %    Platelet Count 251 150 - 450 10e9/L    Diff Method Automated Method     % Neutrophils 50.7 %    % Lymphocytes 36.8 %    % Monocytes 9.3 %    % Eosinophils 2.2 %    % Basophils 0.5 %    % Immature Granulocytes 0.5 %    Nucleated RBCs 0 0 /100    Absolute Neutrophil 2.9 1.6 - 8.3 10e9/L    Absolute Lymphocytes 2.1 0.8 - 5.3 10e9/L    Absolute Monocytes 0.5 0.0 - 1.3 10e9/L    Absolute Eosinophils 0.1 0.0 - 0.7 10e9/L    Absolute Basophils 0.0 0.0 - 0.2 10e9/L    Abs Immature Granulocytes 0.0 0 - 0.4 10e9/L    Absolute Nucleated RBC 0.0    Comprehensive metabolic panel    Collection Time: 09/22/17  9:39 AM   Result Value Ref Range    Sodium 139 133 - 144 mmol/L    Potassium 3.6 3.4 - 5.3 mmol/L    Chloride 106 94 - 109 mmol/L    Carbon Dioxide 26 20 - 32 mmol/L    Anion Gap 6 3 - 14 mmol/L    Glucose 111 (H) 70 - 99 mg/dL    Urea Nitrogen 18 7 - 30 mg/dL    Creatinine 0.75 0.52 - 1.04 mg/dL    GFR Estimate 79 >60 mL/min/1.7m2    GFR Estimate If Black >90 >60 mL/min/1.7m2    Calcium 8.8 8.5 - 10.1 mg/dL    Bilirubin Total 0.5 0.2 - 1.3 mg/dL    Albumin 3.4 3.4 - 5.0 g/dL    Protein Total 7.2 6.8 - 8.8 g/dL    Alkaline Phosphatase 101 40 - 150 U/L    ALT 23 0 - 50 U/L    AST 21 0 - 45 U/L   Magnesium    Collection Time: 09/22/17  9:39 AM   Result Value Ref Range    Magnesium 1.6 1.6 - 2.3 mg/dL       Collection Time: 09/22/17  9:39 AM   Result Value Ref Range     187 (H) 0 - 30 U/mL

## 2017-09-22 NOTE — MR AVS SNAPSHOT
After Visit Summary   9/22/2017    Odalys Nathan    MRN: [id number]           Patient Information     Date Of Birth          1960        Visit Information        Provider Department      9/22/2017 11:30 AM UC 31 ATC;  ONCOLOGY INFUSION [facility]        Today's Diagnoses     Encounter for long-term (current) use of medications    -  1    Ovarian cancer, right (H)        Ovarian cancer, left (H)        Drug-induced neutropenia (H)          Care Instructions    Contact Numbers    [facility] Main Line: [phone]  [facility] Triage:  [phone]    Call triage with chills and/or temperature greater than or equal to 100.5, uncontrolled nausea/vomiting, diarrhea, constipation, dizziness, shortness of breath, chest pain, bleeding, unexplained bruising, or any new/concerning symptoms, questions/concerns.     If you are having any concerning symptoms or wish to speak to a provider before your next infusion visit, please call your care coordinator or triage to notify them so we can adequately serve you.       After Hours: [phone]    If after hours, weekends, or holidays, call main hospital  and ask for Oncology doctor on call.         September 2017 Sunday Monday Tuesday Wednesday Thursday Friday Saturday                            1     2       3     4     5     6     7     8     9       10     11     12     13     14     NM HEART MUGA REST   10:00 AM   (60 min.)   RSCCNM1   [facility] 15     16       17     18     19     20     21     22     [facility] LAB DRAW    9:45 AM   (15 min.)   [facility] LAB DRAW   [facility] Lab Draw     [facility] RETURN ACTIVE TREATMENT   10:05 AM   (40 min.)   Patricia Rocha APRN CNP   [facility] ONC INFUSION 240   11:30 AM   (240 min.)    ONCOLOGY INFUSION   [facility] 23       24     25     26     27     28     29     30                October 2017 Sunday Monday Tuesday  Wednesday Thursday Friday Saturday   1     2     3     4     5     6     7       8     9     10     11     12     13     14       15     16     17     18     19     20     [facility]    9:45 AM   (15 min.)   [facility] LAB DRAW   [facility] Lab Draw     [facility] RETURN ACTIVE TREATMENT   10:05 AM   (40 min.)   Patricia Rocha APRN CNP   [facility] ONC INFUSION 240   11:30 AM   (240 min.)    ONCOLOGY INFUSION   [facility] 21       22     23     24     25     26     27     28       29     30     31                                     Recent Results (from the past 24 hour(s))   CBC with platelets differential    Collection Time: 09/22/17  9:39 AM   Result Value Ref Range    WBC 5.8 4.0 - 11.0 10e9/L    RBC Count 3.60 (L) 3.8 - 5.2 10e12/L    Hemoglobin 12.3 11.7 - 15.7 g/dL    Hematocrit 37.7 35.0 - 47.0 %     (H) 78 - 100 fl    MCH 34.2 (H) 26.5 - 33.0 pg    MCHC 32.6 31.5 - 36.5 g/dL    RDW 12.3 10.0 - 15.0 %    Platelet Count 251 150 - 450 10e9/L    Diff Method Automated Method     % Neutrophils 50.7 %    % Lymphocytes 36.8 %    % Monocytes 9.3 %    % Eosinophils 2.2 %    % Basophils 0.5 %    % Immature Granulocytes 0.5 %    Nucleated RBCs 0 0 /100    Absolute Neutrophil 2.9 1.6 - 8.3 10e9/L    Absolute Lymphocytes 2.1 0.8 - 5.3 10e9/L    Absolute Monocytes 0.5 0.0 - 1.3 10e9/L    Absolute Eosinophils 0.1 0.0 - 0.7 10e9/L    Absolute Basophils 0.0 0.0 - 0.2 10e9/L    Abs Immature Granulocytes 0.0 0 - 0.4 10e9/L    Absolute Nucleated RBC 0.0    Comprehensive metabolic panel    Collection Time: 09/22/17  9:39 AM   Result Value Ref Range    Sodium 139 133 - 144 mmol/L    Potassium 3.6 3.4 - 5.3 mmol/L    Chloride 106 94 - 109 mmol/L    Carbon Dioxide 26 20 - 32 mmol/L    Anion Gap 6 3 - 14 mmol/L    Glucose 111 (H) 70 - 99 mg/dL    Urea Nitrogen 18 7 - 30 mg/dL    Creatinine 0.75 0.52 - 1.04 mg/dL    GFR Estimate 79 >60 mL/min/1.7m2    GFR Estimate  If Black >90 >60 mL/min/1.7m2    Calcium 8.8 8.5 - 10.1 mg/dL    Bilirubin Total 0.5 0.2 - 1.3 mg/dL    Albumin 3.4 3.4 - 5.0 g/dL    Protein Total 7.2 6.8 - 8.8 g/dL    Alkaline Phosphatase 101 40 - 150 U/L    ALT 23 0 - 50 U/L    AST 21 0 - 45 U/L   Magnesium    Collection Time: 09/22/17  9:39 AM   Result Value Ref Range    Magnesium 1.6 1.6 - 2.3 mg/dL       Collection Time: 09/22/17  9:39 AM   Result Value Ref Range     187 (H) 0 - 30 U/mL                 Follow-ups after your visit        Your next 10 appointments already scheduled     Oct 20, 2017  9:45 AM CDT   [facility] Draw with  [facility] DRAW   [facility]    [address]  [address] [facility] [address]   [phone]            Oct 20, 2017 10:20 AM CDT   (Arrive by 10:05 AM)   Return Active Treatment with HAILE De Paz CNP   [facility] ([facility])    [address]  [address] [facility] [address]   [phone]            Oct 20, 2017 11:30 AM CDT   Infusion 240 with UC ONCOLOGY INFUSION, UC 31 ATC   [facility] ([facility])    [address]  [address] [facility] [address]   [phone]            Nov 17, 2017  9:45 AM CST   [facility] Lab Draw with UC [facility] LAB DRAW   [facility] Lab Draw ([facility])    [address]  [address] [facility] [address]   [phone]            Nov 17, 2017 10:20 AM CST   (Arrive by 10:05 AM)   Return Active Treatment with HAILE De Paz CNP   [facility] ([facility])    [address]  [address] [facility] [address]   [phone]            Nov 17, 2017 11:30 AM CST   Infusion 240 with UC ONCOLOGY INFUSION, UC 31 ATC   [facility] ([facility])    [address]  [facility] [address] [phone]              Who to contact     If you have questions or need follow up information about today's clinic visit or your schedule please contact [facility] directly at [phone].  Normal or non-critical lab and imaging results will be communicated to you by MyChart, letter or phone within 4 business days after the clinic has received the results. If you do not hear from us within 7 days, please contact the clinic through [employer] or phone. If you have a critical or abnormal lab result, we will notify you by phone as soon as possible.  Submit refill requests through [employer]iner or call your pharmacy and they will forward the refill request to us. Please allow 3 business days for your refill to be completed.          Additional Information About Your Visit        [employer] Information     [employer] gives you secure access to your electronic health record. If you see a primary care provider, you can also send messages to your care team and make appointments. If you have questions, please call your primary care clinic.  If you do not have a primary care provider, please call [phone] and they will assist you.        Care EveryWhere ID     This is your Care EveryWhere ID. This could be used by other organizations to access your [place] medical records  [id number]         Blood Pressure from Last 3 Encounters:   09/22/17 120/65   08/10/17 139/86   07/13/17 123/69    Weight from Last 3 Encounters:   09/22/17 62.7 kg (138 lb 4.8 oz)   08/10/17 62.6 kg (138 lb 0.1 oz)   07/13/17 62 kg (136 lb 11.2 oz)              We Performed the Following          CBC with platelets differential     Comprehensive metabolic panel     Magnesium          Today's Medication Changes          These changes are accurate as of: 9/22/17  2:12 PM.  If you have any questions, ask your nurse or doctor.               Start taking these medicines.        Dose/Directions     prochlorperazine 10 MG tablet   Commonly known as:  COMPAZINE   Used for:  Encounter for long-term (current) use of medications, Ovarian cancer, right (H), Ovarian cancer, left (H), Drug-induced neutropenia (H)        Dose:  10 mg   Take 1 tablet (10 mg) by mouth every 6 hours as needed (nausea/vomiting)   Quantity:  30 tablet   Refills:  2         These medicines have changed or have updated prescriptions.        Dose/Directions    cyanocobalamin 1000 MCG/ML injection   Commonly known as:  VITAMIN B12   This may have changed:  when to take this   Used for:  B12 deficiency        Dose:  1 mL   Inject 1 mL (1,000 mcg) into the muscle every 30 days   Quantity:  1 mL   Refills:  11       magnesium oxide 400 MG tablet   Commonly known as:  MAG-OX   This may have changed:  when to take this   Used for:  Ovarian cancer, unspecified laterality (H)        Dose:  400 mg   Take 1 tablet (400 mg) by mouth 2 times daily   Quantity:  90 tablet   Refills:  3            Where to get your medicines      These medications were sent to [address] [place] [address] [address] [id number]    Hours:  TRANSPLANT PHONE NUMBER [phone] Phone:  [phone]     prochlorperazine 10 MG tablet         Some of these will need a paper prescription and others can be bought over the counter.  Ask your nurse if you have questions.     Bring a paper prescription for each of these medications     LORazepam 1 MG tablet                Primary Care Provider Office Phone # Fax #    Aubrie Brooksjethro [phone]       [facility] [address] [address] [facility] [address]        Equal Access to Services     MATEO CHAMBERS AH: Felicia Walter, wajeovanny luqadaha, qaybta kaalmada bel, blanka szymanski. So [facility] [phone].    ATENCIÓN: Si habla español, tiene a bell disposición servicios gratuitos de asistencia  lingüística. Liban al [phone].    We comply with applicable federal civil rights laws and [place] laws. We do not discriminate on the basis of race, color, national origin, age, disability sex, sexual orientation or gender identity.            Thank you!     Thank you for choosing [facility]  for your care. Our goal is always to provide you with excellent care. Hearing back from our patients is one way we can continue to improve our services. Please take a few minutes to complete the written survey that you may receive in the mail after your visit with us. Thank you!             Your Updated Medication List - Protect others around you: Learn how to safely use, store and throw away your medicines at www.disposemymeds.org.          This list is accurate as of: 9/22/17  2:12 PM.  Always use your most recent med list.                   Brand Name Dispense Instructions for use Diagnosis    ASPIRIN PO      Take 325 mg by mouth daily        CALCIUM + D PO      Take 1 tablet by mouth daily.    Pelvic mass       cyanocobalamin 1000 MCG/ML injection    VITAMIN B12    1 mL    Inject 1 mL (1,000 mcg) into the muscle every 30 days    B12 deficiency       diphenoxylate-atropine 2.5-0.025 MG per tablet    LOMOTIL    60 tablet    Take 2 tablets by mouth 4 times daily as needed for diarrhea    Acute diarrhea       ferrous sulfate 325 (65 FE) MG tablet    IRON    90 tablet    Take 1 tablet (325 mg) by mouth 3 times daily (with meals) Take with small amount of orange juice, do not take with calcium    Ovarian cancer, right (H), Anemia, unspecified type, Ovarian cancer, left (H)       HERBALS      daily        LEVOTHYROXINE SODIUM PO      Take by mouth daily        LORazepam 1 MG tablet    ATIVAN    30 tablet    Take 1 tablet (1 mg) by mouth every 6 hours as needed (Anxiety, Nausea/Vomiting or Sleep)    Ovarian cancer, unspecified laterality (H)       magnesium oxide 400 MG tablet    MAG-OX    90 tablet     Take 1 tablet (400 mg) by mouth 2 times daily    Ovarian cancer, unspecified laterality (H)       METFORMIN HCL PO      Take 500 mg by mouth daily        order for DME     3 each    Injection Supplies for Vitamin B12: 3cc syringes w/ 27 gauge needles, 1/2 inch length    B12 deficiency       potassium chloride 20 MEQ Packet    KLOR-CON     Take 20 mEq by mouth daily        prochlorperazine 10 MG tablet    COMPAZINE    30 tablet    Take 1 tablet (10 mg) by mouth every 6 hours as needed (nausea/vomiting)    Encounter for long-term (current) use of medications, Ovarian cancer, right (H), Ovarian cancer, left (H), Drug-induced neutropenia (H)       VITAMIN C PO      Take 500 mg by mouth daily    Pelvic mass       VITAMIN D PO      Take 1,000 Units by mouth daily Unknown dose        VITAMIN E NATURAL PO      Take 100 Units by mouth daily

## 2017-09-22 NOTE — LETTER
2017       RE: Odalys Nathan  [id number]  [facility] [id number]     Dear Colleague,    Thank you for referring your patient, Odalys Nathan, to the [facility]. Please see a copy of my visit note below.    Gynecologic Oncology Follow-Up Note  RE: Odalys Nathan  MRN: [id number]  : 1960  Date of Visit: 2017    CC: Odalys Nathan is a 57 year old year old female with recurrent stage IIIC bilateral ovarian cancer who presents today for disease management with Doxil.    HPI: Odalys comes to the clinic accompanied by her  Marcos and daughter Ping. She is feeling well without concerns today. She continues on ferrous sulfate 325mg TID for history of anemia. She also continues with chronic diarrhea and tinnitus- denies need for intervention. She previously received Doxil/carboplatin in the past and has tolerated this fairly well but did have a rash after C5 Doxil that resolved with Benadryl, no hives or s/sxs anaphylaxis and received C6 Doxil without adverse effect. She is eating and drinking well, reports she is ready for chemotherapy today.      Brief Oncology History:  2012 - Admitted to hospital for 2 weeks of intermittent abdominal cramping, distention, diarrhea and N/V. CT of abdomen/pelvis significant for small bowel obstruction, a heterogenous soft tissue density in the pelvis, omental nodules, and ascites. Bilateral adnexal masses per U/S of pelvis. CA-125 was elevated at 987, CEA was normal at 1.0.    12 - Therapeutic paracentesis (4 L) with cytology confirming malignancy (GA positive, weak ER positive, CK-7 positive consistent with GYN primary). Surgery recommended d/t potential for falling blood counts 2/2 chemotherapy (patient is [employer] and limiting blood transfusion)    12 - Exploratory laparotomy, MAR BSO, lysis of adhesion, Appendectomy, Repair cystotomy, Omentectomy Pibu-dxgqfq-ymskwdbur-transverse-colon  resection, Ileo-descending colon anastomosis, CUSA, & Colonoscopy (done by Dr. Amato and colorectal team).  2/20/2012 - Admitted to hospital for bilateral pulmonary emboli and drainage of pleural effusion. Started on Lovenox.  2/28/12-3/21/12: Cycle #1-2 Carbo/Taxol IV  3/29/12 - Started on Keflex by her PCP for infection in her healing wound (immediately below her umbilicus).    4/11/12-6/14/12: Cycle #3-6 IV chemo, Cycle #1 IV/IP chemo  7/16/12 -  8, CT PRADEEP - enrolled in  - observation arm  3/4/13-6/28/13:  6, 9, 12, 15, 20.  7/1/13: CT Chest, Abdomen, Pelvis IMPRESSION:  1. Worsening metastatic ovarian carcinoma suggested by increased size of soft tissue nodules anterior to the right psoas muscle, that may represent growing mesenteric lymphadenopathy.  2. Remaining prominent right lower quadrant mesenteric lymph nodes are not significantly changed from CT 7/16/2012.  3. Clustered nodular opacities in the right lower lobe are not significant change from 7/16/2012 and remain indeterminate. Again, the appearance and distribution is suggestive of an infectious etiology.  4. Stable 8 mm soft tissue nodule in left breast, unchanged since at least 02/20/2012. This can be observed on followup studies, but correlation with mammography could be considered.  Decision made to start Doxil/Carbo.  7/11/13: The left ventricular ejection fraction is normal at 66.4%.  7/12/13-9/6/13: Cycle #1-3 Doxil/Carbo.  10, 11.    9/30/13 CT C/A/P Impression:  1. Overall, favorable response to treatment with decreasing size of soft tissue nodules tracking along the anterior aspect of the right psoas muscle.  2. 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  3. Improved cluster of right lower lobe pulmonary nodular opacities. These may represent resolving infection.  10/3/13-12/9/13:  9, 8, 10. Cycle 4-6 Doxil/Carbo.    1/13/14 CT C/A/P Impression:   1. Stable appearance of metastatic ovarian cancer. Scattered  soft tissue nodules along the anterior aspect of the right psoas muscle are unchanged in size. Mild mesenteric lymphadenopathy is unchanged.  2. Clustered micronodules in the right lower lobe are unchanged from 9/30/2013, but improved from 7/1/2013. This history suggests a postinflammatory/postinfectious etiology.  3. 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  1/16/14 Discussed multiple options for her based on relatively stable-appearing disease on CT but slight increase in  (which has had small increase to 20 with last recurrence) including chemo break with recheck of  in 1 month, starting new chemo agent immediately, and exploratory surgery with possible resection of nodules. She is considered platinum-sensitive based on > 1 year remission after Taxol/Carbo, which we will take into consideration for future chemo planning. I am not inclined to surgery at this time given difficulty she already has with diarrhea secondary to past colon resection. I suspect we would need to resect further bowel due to mesenteric disease. Also explained inherent risks of any major surgery. Also mentioned maintenance chemo, but this has not been shown to increase overall survival and would likely decrease her quality of life without significant benefit. Family is going on vacation to [place] in 2 weeks and Odalys does not want to have chemo prior to that, so will plan to take 1 month break. She can have  at that time (discussed checking toady since last draw was in early December, but as it would likely not change treatment plan and she has h/o slow rising , will not check today).  2/17/14  16    2/20/14: Decision to take break from chemo for two months, followed by CT and CA-125.    4/21/14  27  4/21/14 CT C/A/P Impression:    1. Increased size of 2 low-attenuation lymph nodes anterior to the right psoas muscle is concerning for worsening metastatic ovarian cancer.    2. New circumferential  "thickening of a 3.8 cm length segment of distal transverse colon is likely physiologic. Recommend attention on followup imaging.    3. Grossly unchanged size of clustered small nodules versus scarring in the right lower lobe the lungs.    4. Stable thrombosis of the right ovarian vein.  5/14/14: Diagnostic laparoscopy converted to exploratory laparotomy and removal of mesenteric masses, tumor debulking, peritoneal biopsies and intraperitoneal port placement. On laparoscopy, it was noted that there were small nodules on the anterior abdominal wall near the previous incision, small nodules on the right pelvic sidewall as well. Nodules were palpated in the mesentery; however, as it was unable to clarify where the origin of the nodules was, the decision was made to open the patient. On opening there was found to be approximately a 3 cm nodule in the small bowel mesentery and another separate approximately 2 cm nodule in the bowel mesentery. Pelvis without evidence of cancer, some mesenteric lymph nodes were palpated. No evidence otherwise of any disseminated cancer throughout the abdomen.    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 (2/2)  -Largest metastasis: 1.3 cm  -See comment  D: Peritoneum, right paracolic gutter #2, biopsy:  -Fibroadipose tissue with granulomatous inflammation surrounding refractile material  -Negative for malignancy   E: Small bowel adhesion, biopsy:  -Fibroconnective tissue, consistent with adhesion  -Negative for malignancy  F: Lymph nodes, mesentry, not otherwise specified, excision:  -Two lymph nodes, negative for metastatic carcinoma (0/2)  G: " "Lymph node, mesentery, \"#2\", excision:  -One lymph node, negative for metastatic carcinoma (0/1)  H: Lymph nodes, mesentery, \"nodule #2\", excision:  -Five lymph nodes, negative for metastatic carcinoma (0/5)  COMMENT:  Some of the specimens show post-operative changes. Others show possible treatment related changes, including necrosis. The metastatic carcinoma in the mesenteric lymph nodes (specimen C) shows variable morphology, including relatively low grade tumor with papillary architecture, and high grade tumor comprised of nests of tumor cells with irregular, slit-like spaces and marked nuclear pleomorphism.    5/29/14: Cycle 1 IV PACLitaxel / IP CISplatin / IP PACLitaxel.  - 28.  6/26/14: Cycle #2 IV/IP.  10  8/5/14: CT chest/abd/pelvis IMPRESSION     1. In this patient with ovarian cancer, overall findings are indicative of stable/slight improvement, as multiple mesenteric lymphadenopathy and scattered nodular peritoneal soft tissue mass lesions appear unchanged or slightly smaller since 4/21/2014.    2. 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 Mild dilatation of the second and the third portion of the duodenum with a narrow SMA angle. This could represent SMA syndrome, if clinically correlated.17/14:    Cycle #3 IV/IP.  10.    CT chest/abd/pelvis with contrast on 8/5/14    Impression:    1. In this patient with ovarian cancer, overall findings are indicative of stable/slight improvement, as multiple mesenteric lymphadenopathy and scattered nodular peritoneal soft tissue mass lesions appear unchanged or slightly smaller since 4/21/2014.    2. 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 Mild dilatation of the second and the third portion of the duodenum with a narrow SMA angle. This could represent SMA syndrome, if clinically correlated.  8/7/14: Cycle #4 Taxol/Carbo (changed from IV/IP).  10. She has been feeling okay. She is unsure if she can finish " out the course of 6 cycles IV/IP taxol/cisplatin. She feels like she has the flu for about a week then starts feeling gradually better after each chemo cycle. Her spouse notes that she actually has been more sick with the treatments than she initially admits here. She was also previously having some rib pain. Denies any rib pain now. Denies any chest pain or shortness of breath.  Plan: discussed recent CT cap results and switching to just IV as she is feeling miserable with IP treatments. Switch to IV carbo/taxol as patient is platinum sensitive.  We also discussed her taking part of the tesaro trial, which would require BRCA testing. She would like to take part in this trial if eligible.  8/20/14: Remove Intraperitoneal Port ( Port and catheter intact - discarded)  8/28/14: Cycle #5 Taxol/Carbo held due to thrombocytopenia.  6.    She denies any vaginal bleeding, no changes in her bowel or bladder habits, no nausea/emesis, no lower extremity edema, and no difficulties eating or sleeping. She denies any abdominal discomfort/bloating, no fevers or chills, and no chest pain or shortness of breath. She states her diarrhea is the same. She reports some fatigue which improves about 1-2 weeks after her chemotherapy. She states she does not need any medication refills and she was told she does not meet the criteria for the TESARO trial. She states she has 3 bags of iv fluids left over from her previous chemotherapy and will give these to herself. She states she is ready for her treatment today.    9/29/14: Cycle #6 Taxol/Carbo  6. Insurance questions regarding GSF coverage today. No concerns other than fatigue. Taking iron for anemia and does not desire blood transfusion. Using neulasta for neutropenia. Using home IV hydration if needed. Baseline unchanged. No abdominal bloating, constipation, diarrhea, pain, vaginal or rectal bleeding, cough or dyspnea, fluid retention.    10/16/14: Impression:    1. Nodular  peritoneal soft tissue mass in the right lower quadrant adjacent to the psoas muscle is no longer appreciated. Adjacent prominent lymphadenopathy is unchanged from previous exam. No new peritoneal lesions.    2. 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    10/20/14:  5. CT chest/abdomen/pelvis on 10/16/14 showed nodular peritoneal soft tissue mass in the right lower quadrant adjacent to the psoas muscle is no longer appreciated. Adjacent prominent lymphadenopathy is unchanged from previous exam. 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  1/27/15:  6.  4/28/15:  14.  5/26/15:  18.  6/2/15: CT cap Impression:  1. Postsurgical changes of hysterectomy and bilateral salpingo-oophorectomy for ovarian cancer. There is a new 8 mm hazy, ill-defined hypoattenuating lesion in hepatic segment 6 which is suspicious for a metastatic deposit. Further evaluation with ultrasound in recommended.    2. Increased size of a left retroperitoneal lymph node which is indeterminate but may represent a ciro metastasis. Mildly prominent lymph nodes in the right lower quadrant are not significantly changed.  3. Moderate colonic stool burden.    6/4/15: US abdomen IMPRESSION:    Hyperechoic lesion in the right hepatic lobe, consistent with hemangioma. This does not corresponds to the area of the lesion seen on CT from 6/2/2015. An MR would be helpful for identifying and characterizing the lesion from the recent CT.  6/12/15: MR abd IMPRESSION:  1. New 20 x 11 mm enhancing lesions between the right obliques, concerning for metastatic disease. This lesions should be amenable to percutaneous biopsy, if indicated.  2. Correlating to the lesion visualized on comparison CT is a hepatic segment 6 subcapsular 7 mm lesion. Overall the appearance favors the diagnosis of a simple cyst. However, there is faint suggestion of mild peripheral arterial enhancement. Although this  is favored as  artifactual, this should be followed up to confirm stability. Recommend 6 month followup.  3. Hepatic segment 6, 5 mm lesion too small to technically characterize. Differential would favor FNH, less likely flash filling hemangioma. Recommend attention on followup.  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     9/1/15: Cycle #1 Avastin/Cytoxan.   31.     9/24/15:feeling generally well. She says she has been having back and stomach spasms. She is taking cytosine daily (she ran out yesterday). She also says its affecting her voice. Admits that it burns occasionally. She is eating and drinking normal. She also admit diarrhea, 5-7 times daily, lose/watery. She trying to stay hydrated and eat fiber. She also says that her body is sore, especially the bottom of her feet. Her blood pressure is normal. She also admits having headache after her first infusion.      9/24/15: Cycle #2 Avastin/Cytoxan.  19.  10/15/15: Cycle #3 Avastin/Cytoxan.  16.     11/6/15: CT c/a/p IMPRESSION:    1. Stable postoperative change of MAR/BSO for ovarian cancer.  2. The lesion in the right flank abdominal musculature is slightly decreased in size. Otherwise, stable examination.  2. No evidence of metastatic disease in the chest.    11/20/15: Treatment planning visit,  16    11/25/15: surgical pathology report  FINAL DIAGNOSIS:  Soft tissue, right oblique muscle mass, excision:  -Recurrent ovarian serous carcinoma  -Carcinoma is present less than 1 mm from one resection margin  -Background skeletal muscle and fibroadipose tissue    1/4/16:  22  1/11/16-1/27/16: Radiation to right flank x 12 treatments  4/7/2016:  94. CT cap IMPRESSION:    In this patient with ovarian cancer status post MAR/BSO and descending/transverse colectomy:  1. No evidence for malignancy in  the chest, abdomen, or pelvis.  2. Stable small hypodense segment 6 liver lesion, appears more likely benign, possibly a cyst.  5/13/16:  124.  6/3/16: PET CT IMPRESSION: In this patient with a history of ovarian cancer:  1. Hypermetabolic and enlarging periaortic and perihepatic lymphadenopathy compatible with metastatic disease, as detailed above.  2. Although hypodense lesion in hepatic segment 6 has been present since 6/2/2015 associated hypermetabolism makes this lesion highly concerning for metastatic disease.    Plan: to start Niraparib under TESARO study.     6/9/16:  137.  6/14/16: Cycle #1 Niraparib.    6/28/16: Cycle #1 D15 Niraparib.    7/5/16:  100.    7/11/16: Cycle #2 Nraparib.   83.    8/3/2016: PET CT IMPRESSION:    In this patient with known history of ovarian cancer:  1) New pleural based nodular opacities in the lateral and inferior aspects of the bilateral lower lobes, worse in the left lung. Likely infection. Close follow up is recommended.      2) Slight decrease in hypermetabolic abdominal lymphadenopathy. 2 hypermetabolic lymph nodes persist.  3) Unchanged right hepatic lobe metastatic lesion.     8/9/16: Cycle #3 Niraparib.  69.  9/6/16: Cycle #4 Niraparib.  53. Dose held due to anemia.  9/13/16: Eval for potential cycle 4 niraparib. Dose held due to anemia.  10/4/16: CT CAP impression:  IMPRESSION: In this patient with a known history of ovarian cancer:  1. There has been interval resolution of pleural-based nodular  opacities which likely represented infection.  2. Abdominal lymphadenopathy in the form of 2 portacaval lymph nodes  have not significantly changed in size, noted to be hypermetabolic on  prior PET/CT.  3. Previously demonstrated metastatic lesion in the right lobe of the  liver is not significantly changed.  10/11/16:  60  11/1/16: C6 niraparib,  75  11/29/16: C7 niraparib.  78. CT CAP impression as follows:  Target  lesions (RECIST criteria):       A previously described target lesion superior to the head of the  pancreas (series 2, image 64)  (referred to as a perihepatic node on  6/3/2016) may not be a valid target lesion because it measured less  than 1.5 cm originally. However, this particular node has decreased in  size, now measuring 7 mm in short axis versus 14 mm on 6/3/2016 when  measured in a similar fashion.       2.3 cm short axis portacaval lymph node on series 2 image 67,  previously 2.0 cm on 10/4/2016.       1.2 cm subtle hypodensity in hepatic segment 6 on series 2 image 75,  stable on multiple studies since at least 6/3/2016     Sum of diameters today: 3.5 cm. Sum of diameters 10/4/2016: 3.2 cm.  Growth = 9%.    12/27/16: C8 niraparib.  105.  1/25/17: C9 niraparib.  108.  2/23/17: C10 niraparib.  132.   CT CAP impression:  Sum of target lesion diameters today: 3.7 cm. Sum of target lesion  diameters on 11/28/2016: 3.5 cm. Growth= 6%  1. In this patient with history of ovarian cancer there is stable  disease by RECIST criteria as evidenced by:   1a. Mildly increased size of liver metastasis.  1b. Stable portacaval lymphadenopathy.  1c. No evidence of metastatic disease in the chest.  2. Trace emphysematous changes of the lungs.  3/22/17: C11 niraparib.  132.  4/19/17: C12 niraparib.  127.  5/16/17: CT CAP IMPRESSION: In this patient with ovarian cancer:  1. Mildly increased size of hepatic metastasis segment 6 with subtle increased capsular retraction.  2. Stable edmundo hepatis nodes, with mild increase in size of periaortic lymph nodes.  3. Prominent left supraclavicular lymph node with subtle increase in size compared to prior studies, particularly comparing to 10/4/2016.  4. Mild subtle groundglass opacities in the right upper lobe, not present on prior study, lesser extent in the right lower lobe.  Findings may represent infection, additional consideration is malignancy (less  likely), and attention on follow-up study  Recommended.  Addendum:   Prominent left supraclavicular lymph node (3/25) is stable from most recent CT performed 2/20/2017, currently measuring 14 x 16 mm, previously 14 x 16 mm on 2/20/2017.      The portal caval lymph node/edmundo hepatis lymph node is stable from 2/20/2017, measuring 21 mm.      Clarification of size of the para-aortic lymph node (series 3 image 327). It short axis measurement is 11 mm versus 9 mm on prior study.      Hepatic segment 6 triangular-shaped low density lesion (3/362) measures 14 mm, previously measured 12 mm, demonstrating a  possible/questionable minimal subtle increase in size. Similarly the lymph nodes noted above in the supraclavicular and para-aortic regions demonstrate possible minimal possible subtle increase in size.      5/18/17: Cycle #13 Niraparib.  191.  6/15/17: Cycle #14 Niraparib.  146.  7/13/17: Cycle #15 Niraparib 200 mg.  171     CT (8/9/17):       IMPRESSION:  1. Segment 6 hepatic metastasis is stable to minimally increased in  size.  2. Slight increase in multicentric adenopathy, most pronounced at the  edmundo hepatis. Additional sites include the inferior left  neck/supraclavicular region and retroperitoneum which appear stable to  minimally increased.      8/10/17:  175  9/14/17: MUGA LVEF 54%      Past Medical History:   Diagnosis Date     Antiplatelet or antithrombotic long-term use      Ascites      Blood clot in the legs      Diabetes (H)      Ovarian cancer (H)     serous,stg IV     Pleural effusion      Pulmonary embolism (H) 2/2012     Refusal of blood transfusions as patient is [employer]      Short gut syndrome      Subclinical hypothyroidism 4/18/2013     Thrombosis of leg        Past Surgical History:   Procedure Laterality Date     COLECTOMY       COLONOSCOPY  2/1/2012    Procedure:COLONOSCOPY; With Biopsy; Surgeon:TONI SULLIVAN; Location:[facility] OR     HYSTERECTOMY TOTAL ABD, RHIANNA  SALPINGO-OOPHORECTOMY, NODE DISSECTION, TUMOR DEBULKING, COMBINED  2/1/2012    Procedure:COMBINED HYSTERECTOMY TOTAL ABDOMINAL, BILATERAL SALPINGO-OOPHORECTOMY, NODE DISSECTION, TUMOR DEBULKING;  Exploratory Laparotomy, Total Abdominal Hysterectomy, Bilateral Salpingo-Oophorectomy, appendectomy,lysis of adhesions, ileal, ascending, transverse and splenic flexure resection, ileal descending bowel renanastomosis, incidental cystotomy repair, CUSA procedure and colonoscopy ; [facility]     INSERT PORT PERITONEAL ACCESS  4/3/2012    Procedure:INSERT PORT PERITONEAL ACCESS; Intraperitoneal Port Placement (c-arm); Surgeon:SAMUEL CARRASCO; Location:[facility] OR     INSERT PORT PERITONEAL ACCESS  5/14/2014    Procedure: INSERT PORT PERITONEAL ACCESS;  Surgeon: Nga Yeung MD;  Location: [facility] OR     INSERT PORT VASCULAR ACCESS       LAPAROSCOPY DIAGNOSTIC (GYN)  5/14/2014    Procedure: LAPAROSCOPY DIAGNOSTIC (GYN);  Surgeon: Nga Yeung MD;  Location: [facility] OR     LAPAROTOMY EXPLORATORY Right 11/25/2015    Procedure: LAPAROTOMY EXPLORATORY;  Surgeon: Nga Yeung MD;  Location: [facility] OR     LAPAROTOMY, TUMOR DEBULKING, COMBINED  5/14/2014    Procedure: COMBINED LAPAROTOMY, TUMOR DEBULKING;  Surgeon: Nga Yeung MD;  Location: [facility] OR     REMOVE CATHETER PERITONEAL N/A 8/20/2014    Procedure: REMOVE CATHETER PERITONEAL;  Surgeon: Nga Yeung MD;  Location: [facility] OR     VASCULAR SURGERY      stent left iliac vein       Current Outpatient Prescriptions   Medication     prochlorperazine (COMPAZINE) 10 MG tablet     ferrous sulfate (IRON) 325 (65 FE) MG tablet     LEVOTHYROXINE SODIUM PO     order for DME     LORazepam (ATIVAN) 1 MG tablet     METFORMIN HCL PO     diphenoxylate-atropine (LOMOTIL) 2.5-0.025 MG per tablet     cyanocobalamin (VITAMIN B12) 1000 MCG/ML injection     magnesium oxide (MAG-OX) 400 MG tablet     ASPIRIN PO     potassium chloride (KLOR-CON) 20 MEQ packet     VITAMIN E NATURAL PO      Cholecalciferol (VITAMIN D PO)     HERBALS     Ascorbic Acid (VITAMIN C PO)     Calcium Carbonate-Vitamin D (CALCIUM + D PO)     No current facility-administered medications for this visit.      Facility-Administered Medications Ordered in Other Visits   Medication     heparin 100 UNIT/ML injection 500 Units          Allergies   Allergen Reactions     Nkda [No Known Drug Allergies]        Family History   Problem Relation Age of Onset     CANCER Mother 69     lung, smoker     CANCER Maternal Uncle 65     brain     Colon Cancer Maternal Aunt 80     colon       Social History     Social History     Marital status:      Spouse name: N/A     Number of children: N/A     Years of education: N/A     Occupational History     Not on file.     Social History Main Topics     Smoking status: Former Smoker     Years: 8.00     Types: Cigarettes     Quit date: 6/21/1980     Smokeless tobacco: Never Used      Comment: started at 9 yo and quit at 18 yo     Alcohol use Yes      Comment: 3x/day wine or jodi     Drug use: No     Sexual activity: Not on file     Other Topics Concern     Not on file     Social History Narrative       Review of Systems     Constitutional:  Negative for fever, chills, weight loss, weight gain, fatigue, decreased appetite, night sweats, recent stressors, height gain, height loss, post-operative complications, incisional pain, hallucinations, increased energy, hyperactivity and confused.   HENT:  Positive for tinnitus. Negative for ear pain, hearing loss, nosebleeds, trouble swallowing, hoarse voice, mouth sores, sore throat, ear discharge, tooth pain, gum tenderness, taste disturbance, smell disturbance, hearing aid, bleeding gums, dry mouth, sinus pain, sinus congestion and neck mass.    Eyes:  Negative for double vision, pain, redness, eye pain, decreased vision, eye watering, eye bulging, eye dryness, flashing lights, spots, floaters, strabismus, tunnel vision, jaundice and eye irritation.    Respiratory:   Negative for cough, hemoptysis, sputum production, shortness of breath, wheezing, sleep disturbances due to breathing, snores loudly, respiratory pain, dyspnea on exertion, cough disturbing sleep and postural dyspnea.    Cardiovascular:  Negative for chest pain, dyspnea on exertion, palpitations, orthopnea, claudication, leg swelling, fingers/toes turn blue, hypertension, hypotension, syncope, history of heart murmur, chest pain on exertion, chest pain at rest, pacemaker, few scattered varicosities, leg pain, sleep disturbances due to breathing, tachycardia, light-headedness, exercise intolerance and edema.   Gastrointestinal:  Positive for diarrhea. Negative for heartburn, nausea, vomiting, abdominal pain, constipation, blood in stool, melena, rectal pain, bloating, hemorrhoids, bowel incontinence, jaundice, rectal bleeding, coffee ground emesis and change in stool.   Genitourinary:  Negative for bladder incontinence, dysuria, urgency, hematuria, flank pain, vaginal discharge, difficulty urinating, genital sores, dyspareunia, decreased libido, nocturia, voiding less frequently, arousal difficulty, abnormal vaginal bleeding, excessive menstruation, menstrual changes, hot flashes, vaginal dryness and postmenopausal bleeding.   Musculoskeletal:  Negative for myalgias, back pain, joint swelling, arthralgias, stiffness, muscle cramps, neck pain, bone pain, muscle weakness and fracture.   Skin:  Negative for nail changes, itching, poor wound healing, rash, hair changes, skin changes, acne, warts, poor wound healing, scarring, flaky skin, Raynaud's phenomenon, sensitivity to sunlight and skin thickening.   Neurological:  Negative for dizziness, tingling, tremors, speech change, seizures, loss of consciousness, weakness, light-headedness, numbness, headaches, disturbances in coordination, extremity numbness, memory loss, difficulty walking and paralysis.   Endo/Heme:  Negative for anemia, swollen glands  "and bruises/bleeds easily.   Psychiatric/Behavioral:  Negative for depression, hallucinations, memory loss, decreased concentration, mood swings and panic attacks.    Breast:  Negative for breast discharge, breast mass, breast pain and nipple retraction.   Endocrine:  Negative for altered temperature regulation, polyphagia, polydipsia, unwanted hair growth and change in facial hair.        Physical Exam:    /65 (BP Location: Right arm, Patient Position: Sitting, Cuff Size: Adult Regular)  Pulse 94  Temp 98.1  F (36.7  C) (Oral)  Resp 16  Ht 1.651 m (5' 5\")  Wt 62.7 kg (138 lb 4.8 oz)  SpO2 99%  Breastfeeding? No  BMI 23.01 kg/m2    General: Alert non-toxic appearing female in no acute distress  HEENT: Normocephalic, atraumatic; PERRLA; no scleral icterus; oropharynx pink without lesions; neck supple without lymphadenopathy  Pulmonary: Lungs clear to auscultation, no increased work of breathing noted  Cardiac: Regular rate and rhythm, S1S2, no clicks, murmurs, rubs, or gallops; bilateral lower extremities without edema  GI: Normoactive bowel sounds x4 quadrants, abdomen soft, non-distended, and non-tender to palpation without masses or organomegaly  : Not indicated  Heme: Cervical, supraclavicular, and inguinal nodes without lymphadenopathy  MSK: Moves all extremities, no obvious muscle wasting  Neuro: No gross deficits, normal gait  Skin: Appropriate color for race, warm and dry, no rashes or lesions to unclothed skin  Psych: Pleasant and interactive, affect bright, makes appropriate eye contact, thought process linear    Labs:      9/22/2017  Day 1   Hemoglobin 11.7 - 15.7 g/dL 12.3   Hematocrit 35.0 - 47.0 % 37.7   Platelet Count 150 - 450 10e9/L 251   Absolute Neutrophil 1.6 - 8.3 10e9/L 2.9   Sodium 133 - 144 mmol/L 139   Potassium 3.4 - 5.3 mmol/L 3.6   Chloride 94 - 109 mmol/L 106   Carbon Dioxide 20 - 32 mmol/L 26   Urea Nitrogen 7 - 30 mg/dL 18   Creatinine 0.52 - 1.04 mg/dL 0.75   Calcium " 8.5 - 10.1 mg/dL 8.8   Magnesium 1.6 - 2.3 mg/dL 1.6   Bilirubin Total 0.2 - 1.3 mg/dL 0.5   ALT 0 - 50 U/L 23   AST 0 - 45 U/L 21   Alkaline Phosphatase 40 - 150 U/L 101   Albumin 3.4 - 5.0 g/dL 3.4   Protein Total 6.8 - 8.8 g/dL 7.2   WBC 4.0 - 11.0 10e9/L 5.8    pending      Assessment/Plan:  1) Recurrent stage IIIC bilateral ovarian cancer: Currently feeling well. Proceed with C1D1 carboplatin/Doxil as ordered by Dr. Yeung. Reviewed risks of reaction and s/sxs reaction and when to call her nurse/seek further care. Discussed Doxil precautions. Lorazepam refilled for anxiety/sleep/nausea. Reviewed signs and symptoms for when she should contact the clinic or seek additional care, including but not limited to fever, chills, inability to keep down food or fluids, nausea and vomiting not controlled with antiemetics, and diarrhea leading to dehydration. Patient to contact the clinic with any questions or concerns in the interim. Tammy Flores RN in for teaching.   2) Genetic counseling: Testing has been performed and she is negative for mutations in BRCA1, BRCA2, EPCAM, MLH1, MSH2, MSH6, PMS2, PTEN, and TP53 genes  3) Health maintenance issues discussed include to continue following up with PCP for non-gynecologic concerns.  4) Patient verbalized understanding of and agreement with plan    A total of 20 minutes spent with patient, over 50% spent in counseling and coordination of care.    HAILE De Paz, FNP-C  Family Nurse Practitioner  Gynecologic Oncology  [facility]  Pager: [phone]

## 2017-09-22 NOTE — MR AVS SNAPSHOT
After Visit Summary   9/22/2017    Odalys Nathan    MRN: [id number]           Patient Information     Date Of Birth          1960        Visit Information        Provider Department      9/22/2017 10:20 AM Patricia Rocha APRN CNP [facility]        Today's Diagnoses     Ovarian cancer, right (H)    -  1    Ovarian cancer, left (H)        Encounter for antineoplastic chemotherapy        Ovarian cancer, unspecified laterality (H)           Follow-ups after your visit        Your next 10 appointments already scheduled     Oct 20, 2017  9:45 AM CDT   [facility] with [facility]   [facility]    [address] [place] [facility] [address]   [phone]            Oct 20, 2017 10:20 AM CDT   (Arrive by 10:05 AM)   Return Active Treatment with HAILE De Paz CNP   [facility] ([facility])    [address] [place] [facility] [address]   [phone]            Oct 20, 2017 11:30 AM CDT   Infusion 240 with [facility] ONCOLOGY INFUSION, UC 31 ATC   [facility] ([facility])    [address] [place] [facility] [address]   [phone]            Nov 17, 2017  9:45 AM CST   [facility] Draw with [facility] D[facility]   [facility]    [address]  [address] [place] [facility] [address]   [phone]            Nov 17, 2017 10:20 AM CST   (Arrive by 10:05 AM)   Return Active Treatment with HAILE De Paz CNP   [facility] ([facility])    [address]  [address] [place] [facility] [address]   [phone]            Nov 17, 2017 11:30 AM CST   Infusion 240 with [facility] ONCOLOGY INFUSION, UC 31 ATC   [facility] ([facility])    [address] [id number] [phone]              Who to contact     If you have questions or need follow up information about today's clinic visit or your schedule please contact [facility] directly at [phone].  Normal or non-critical lab and imaging results will be communicated to you by MyChart, letter or phone within 4 business days after the clinic has received the results. If you do not hear from us within 7 days, please contact the clinic through [facility] or phone. If you have a critical or abnormal lab result, we will notify you by phone as soon as possible.  Submit refill requests through [facility] or call your pharmacy and they will forward the refill request to us. Please allow 3 business days for your refill to be completed.          Additional Information About Your Visit        MyChart Information     [facility] gives you secure access to your electronic health record. If you see a primary care provider, you can also send messages to your care team and make appointments. If you have questions, please call your primary care clinic.  If you do not have a primary care provider, please call [phone] and they will assist you.        Care EveryWhere ID     This is your Care EveryWhere ID. This could be used by other organizations to access your [place] medical records  [id number]        Your Vitals Were     Pulse Temperature Respirations Height Pulse Oximetry Breastfeeding?    94 98.1  F (36.7  C) (Oral) 16 1.651 m (5' 5\") 99% No    BMI (Body Mass Index)                   23.01 kg/m2            Blood Pressure from Last 3 Encounters:   09/22/17 120/65   08/10/17 139/86   07/13/17 123/69    Weight from Last 3 Encounters:   09/22/17 62.7 kg (138 lb 4.8 oz)   08/10/17 62.6 kg (138 lb 0.1 oz)   07/13/17 62 kg (136 lb 11.2 oz)              Today, you had the following     No orders found for display         Today's Medication Changes          These changes " are accurate as of: 9/22/17 12:28 PM.  If you have any questions, ask your nurse or doctor.               Start taking these medicines.        Dose/Directions    prochlorperazine 10 MG tablet   Commonly known as:  COMPAZINE   Used for:  Encounter for long-term (current) use of medications, Ovarian cancer, right (H), Ovarian cancer, left (H), Drug-induced neutropenia (H)        Dose:  10 mg   Take 1 tablet (10 mg) by mouth every 6 hours as needed (nausea/vomiting)   Quantity:  30 tablet   Refills:  2         These medicines have changed or have updated prescriptions.        Dose/Directions    cyanocobalamin 1000 MCG/ML injection   Commonly known as:  VITAMIN B12   This may have changed:  when to take this   Used for:  B12 deficiency        Dose:  1 mL   Inject 1 mL (1,000 mcg) into the muscle every 30 days   Quantity:  1 mL   Refills:  11       magnesium oxide 400 MG tablet   Commonly known as:  MAG-OX   This may have changed:  when to take this   Used for:  Ovarian cancer, unspecified laterality (H)        Dose:  400 mg   Take 1 tablet (400 mg) by mouth 2 times daily   Quantity:  90 tablet   Refills:  3            Where to get your medicines      These medications were sent to [address] [address]    Hours:  TRANSPLANT PHONE NUMBER [phone] Phone:  [phone]     prochlorperazine 10 MG tablet         Some of these will need a paper prescription and others can be bought over the counter.  Ask your nurse if you have questions.     Bring a paper prescription for each of these medications     LORazepam 1 MG tablet                Primary Care Provider Office Phone # Fax #    Aubrie Airam Hester [phone]       [facility] [address] [address] [address]        Equal Access to Services     SANDY CHAMBERS AH: wil Tolentino qaybta kaalmada  blanka staplesnelson alvinlizabeth olivares'aan ah. So [facility] [phone].    ATENCIÓN: Si salas longoria, tiene a bell disposición servicios gratuitos de asistencia lingüística. Liban al [phone].    We comply with applicable federal civil rights laws and [place] laws. We do not discriminate on the basis of race, color, national origin, age, disability sex, sexual orientation or gender identity.            Thank you!     Thank you for choosing [facility]  for your care. Our goal is always to provide you with excellent care. Hearing back from our patients is one way we can continue to improve our services. Please take a few minutes to complete the written survey that you may receive in the mail after your visit with us. Thank you!             Your Updated Medication List - Protect others around you: Learn how to safely use, store and throw away your medicines at www.disposemymeds.org.          This list is accurate as of: 9/22/17 12:28 PM.  Always use your most recent med list.                   Brand Name Dispense Instructions for use Diagnosis    ASPIRIN PO      Take 325 mg by mouth daily        CALCIUM + D PO      Take 1 tablet by mouth daily.    Pelvic mass       cyanocobalamin 1000 MCG/ML injection    VITAMIN B12    1 mL    Inject 1 mL (1,000 mcg) into the muscle every 30 days    B12 deficiency       diphenoxylate-atropine 2.5-0.025 MG per tablet    LOMOTIL    60 tablet    Take 2 tablets by mouth 4 times daily as needed for diarrhea    Acute diarrhea       ferrous sulfate 325 (65 FE) MG tablet    IRON    90 tablet    Take 1 tablet (325 mg) by mouth 3 times daily (with meals) Take with small amount of orange juice, do not take with calcium    Ovarian cancer, right (H), Anemia, unspecified type, Ovarian cancer, left (H)       HERBALS      daily        LEVOTHYROXINE SODIUM PO      Take by mouth daily        LORazepam 1 MG tablet    ATIVAN    30 tablet    Take 1 tablet (1 mg) by mouth every  6 hours as needed (Anxiety, Nausea/Vomiting or Sleep)    Ovarian cancer, unspecified laterality (H)       magnesium oxide 400 MG tablet    MAG-OX    90 tablet    Take 1 tablet (400 mg) by mouth 2 times daily    Ovarian cancer, unspecified laterality (H)       METFORMIN HCL PO      Take 500 mg by mouth daily        order for DME     3 each    Injection Supplies for Vitamin B12: 3cc syringes w/ 27 gauge needles, 1/2 inch length    B12 deficiency       potassium chloride 20 MEQ Packet    KLOR-CON     Take 20 mEq by mouth daily        prochlorperazine 10 MG tablet    COMPAZINE    30 tablet    Take 1 tablet (10 mg) by mouth every 6 hours as needed (nausea/vomiting)    Encounter for long-term (current) use of medications, Ovarian cancer, right (H), Ovarian cancer, left (H), Drug-induced neutropenia (H)       VITAMIN C PO      Take 500 mg by mouth daily    Pelvic mass       VITAMIN D PO      Take 1,000 Units by mouth daily Unknown dose        VITAMIN E NATURAL PO      Take 100 Units by mouth daily

## 2017-09-22 NOTE — PROGRESS NOTES
Infusion Nursing Note:  Odalys Nathan presents today for Cycle 1 Day 1 Doxil, Carboplatin (#12).    Patient seen by provider today: Yes: Patricia Fredeman NP   present during visit today: Not Applicable.    Note: Patient has had both Doxil and Carboplatin in the past. Patient reminded of S/S of a Doxil and Carboplatin reaction.    Intravenous Access:  Implanted Port.    Treatment Conditions:  Lab Results   Component Value Date    HGB 12.3 09/22/2017     Lab Results   Component Value Date    WBC 5.8 09/22/2017      Lab Results   Component Value Date    ANEU 2.9 09/22/2017     Lab Results   Component Value Date     09/22/2017      Lab Results   Component Value Date     09/22/2017                   Lab Results   Component Value Date    POTASSIUM 3.6 09/22/2017           Lab Results   Component Value Date    MAG 1.6 09/22/2017            Lab Results   Component Value Date    CR 0.75 09/22/2017                   Lab Results   Component Value Date    JOSE ALBERTO 8.8 09/22/2017                Lab Results   Component Value Date    BILITOTAL 0.5 09/22/2017           Lab Results   Component Value Date    ALBUMIN 3.4 09/22/2017                    Lab Results   Component Value Date    ALT 23 09/22/2017           Lab Results   Component Value Date    AST 21 09/22/2017     Results reviewed, labs MET treatment parameters, ok to proceed with treatment.  ECHO/MUGA completed 9/14/17  EF 54%.    Post Infusion Assessment:  Patient tolerated infusion without incident.  Patient observed for 30 minutes post Carboplatin per protocol.  Blood return noted pre and post infusion.  Site patent and intact, free from redness, edema or discomfort.  No evidence of extravasations.  Access discontinued per protocol.    Discharge Plan:   Prescription refills given for Ativan.  Discharge instructions reviewed with: Patient and Family.  Patient and/or family verbalized understanding of discharge instructions and all questions  answered.  Copy of AVS reviewed with patient and/or family.  Patient will return 10/20/17 for next appointment.  Patient discharged in stable condition accompanied by:  and daughter.  Departure Mode: Ambulatory.    Nga Antunez RN

## 2017-10-19 ASSESSMENT — ENCOUNTER SYMPTOMS
SNORES LOUDLY: 0
DOUBLE VISION: 0
DYSPNEA ON EXERTION: 0
TINGLING: 0
PALPITATIONS: 0
MYALGIAS: 0
RESPIRATORY PAIN: 0
LEG PAIN: 0
EYE IRRITATION: 0
HOARSE VOICE: 0
ARTHRALGIAS: 0
CONSTIPATION: 0
SEIZURES: 0
DEPRESSION: 0
LOSS OF CONSCIOUSNESS: 0
SINUS CONGESTION: 0
INSOMNIA: 1
BRUISES/BLEEDS EASILY: 0
HYPOTENSION: 0
BLOOD IN STOOL: 0
DIARRHEA: 1
SYNCOPE: 0
DIFFICULTY URINATING: 0
TACHYCARDIA: 0
NECK PAIN: 0
FEVER: 0
HEMATURIA: 0
DECREASED LIBIDO: 0
SINUS PAIN: 0
DECREASED CONCENTRATION: 0
MUSCLE CRAMPS: 0
MUSCLE WEAKNESS: 0
DECREASED APPETITE: 0
PANIC: 0
DISTURBANCES IN COORDINATION: 0
POLYDIPSIA: 0
ABDOMINAL PAIN: 0
NAUSEA: 0
TREMORS: 0
RECTAL BLEEDING: 0
JOINT SWELLING: 0
SLEEP DISTURBANCES DUE TO BREATHING: 0
COUGH: 0
DIZZINESS: 0
BREAST MASS: 0
BLOATING: 0
ORTHOPNEA: 0
NAIL CHANGES: 0
HEMOPTYSIS: 0
DYSURIA: 0
EYE REDNESS: 0
SMELL DISTURBANCE: 0
HYPERTENSION: 0
RECTAL PAIN: 0
STIFFNESS: 0
HALLUCINATIONS: 0
SPEECH CHANGE: 0
NUMBNESS: 0
LEG SWELLING: 0
PARALYSIS: 0
SORE THROAT: 0
POLYPHAGIA: 0
COUGH DISTURBING SLEEP: 0
NERVOUS/ANXIOUS: 1
WEAKNESS: 0
HOT FLASHES: 0
BREAST PAIN: 0
POOR WOUND HEALING: 0
JAUNDICE: 0
LIGHT-HEADEDNESS: 0
MEMORY LOSS: 0
NIGHT SWEATS: 0
SKIN CHANGES: 0
CLAUDICATION: 0
SHORTNESS OF BREATH: 0
BACK PAIN: 0
HEADACHES: 0
WEIGHT GAIN: 0
SWOLLEN GLANDS: 0
POSTURAL DYSPNEA: 0
CHILLS: 0
EXERCISE INTOLERANCE: 0
BOWEL INCONTINENCE: 0
WEIGHT LOSS: 0
FLANK PAIN: 0
HEARTBURN: 0
EYE PAIN: 0
NECK MASS: 0
INCREASED ENERGY: 0
EXTREMITY NUMBNESS: 0
EYE WATERING: 0
ALTERED TEMPERATURE REGULATION: 0
SPUTUM PRODUCTION: 0
WHEEZING: 0
TASTE DISTURBANCE: 0
VOMITING: 0
TROUBLE SWALLOWING: 0

## 2017-10-19 NOTE — PROGRESS NOTES
Gynecologic Oncology Follow-Up Note  RE: Odalys Nathan  MRN: [id number]  : 1960  Date of Visit: 10/20/2017    CC: Odalys Nathan is a 57 year old year old female with recurrent stage IIIC bilateral ovarian cancer who presents today for disease management with Doxil.    HPI: Odalys comes to the clinic accompanied by her  Marcos and sister Carmen. She is feeling well today. Feels her hands may be slightly tender but denies any blistering or skin breakdown. Not icing her hands/feet and not using any cream such as Bag Balm or Udder Cream. Continues with fatigue, able to carry out ADLs and denies need for intervention. She continues on ferrous sulfate 325mg TID for history of anemia. She also continues with chronic diarrhea and tinnitus- denies need for intervention. Has had many cycles of carboplatin, no history of reaction thus far. She is eating and drinking well, reports she is ready for chemotherapy today.      Brief Oncology History:  2012 - Admitted to hospital for 2 weeks of intermittent abdominal cramping, distention, diarrhea and N/V. CT of abdomen/pelvis significant for small bowel obstruction, a heterogenous soft tissue density in the pelvis, omental nodules, and ascites. Bilateral adnexal masses per U/S of pelvis. CA-125 was elevated at 987, CEA was normal at 1.0.    12 - Therapeutic paracentesis (4 L) with cytology confirming malignancy (DE positive, weak ER positive, CK-7 positive consistent with GYN primary). Surgery recommended d/t potential for falling blood counts 2/2 chemotherapy (patient is Religion and limiting blood transfusion)    12 - Exploratory laparotomy, MAR BSO, lysis of adhesion, Appendectomy, Repair cystotomy, Omentectomy Vcle-mfpyem-gikjltvwl-transverse-colon resection, Ileo-descending colon anastomosis, CUSA, & Colonoscopy (done by Dr. Amato and colorectal team).  2012 - Admitted to hospital for bilateral pulmonary emboli and drainage  of pleural effusion. Started on Lovenox.  2/28/12-3/21/12: Cycle #1-2 Carbo/Taxol IV  3/29/12 - Started on Keflex by her PCP for infection in her healing wound (immediately below her umbilicus).    4/11/12-6/14/12: Cycle #3-6 IV chemo, Cycle #1 IV/IP chemo  7/16/12 -  8, CT PRADEEP - enrolled in  - observation arm  3/4/13-6/28/13:  6, 9, 12, 15, 20.  7/1/13: CT Chest, Abdomen, Pelvis IMPRESSION:  1. Worsening metastatic ovarian carcinoma suggested by increased size of soft tissue nodules anterior to the right psoas muscle, that may represent growing mesenteric lymphadenopathy.  2. Remaining prominent right lower quadrant mesenteric lymph nodes are not significantly changed from CT 7/16/2012.  3. Clustered nodular opacities in the right lower lobe are not significant change from 7/16/2012 and remain indeterminate. Again, the appearance and distribution is suggestive of an infectious etiology.  4. Stable 8 mm soft tissue nodule in left breast, unchanged since at least 02/20/2012. This can be observed on followup studies, but correlation with mammography could be considered.  Decision made to start Doxil/Carbo.  7/11/13: The left ventricular ejection fraction is normal at 66.4%.  7/12/13-9/6/13: Cycle #1-3 Doxil/Carbo.  10, 11.    9/30/13 CT C/A/P Impression:  1. Overall, favorable response to treatment with decreasing size of soft tissue nodules tracking along the anterior aspect of the right psoas muscle.  2. 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  3. Improved cluster of right lower lobe pulmonary nodular opacities. These may represent resolving infection.  10/3/13-12/9/13:  9, 8, 10. Cycle 4-6 Doxil/Carbo.    1/13/14 CT C/A/P Impression:   1. Stable appearance of metastatic ovarian cancer. Scattered soft tissue nodules along the anterior aspect of the right psoas muscle are unchanged in size. Mild mesenteric lymphadenopathy is unchanged.  2. Clustered micronodules in the right  lower lobe are unchanged from 9/30/2013, but improved from 7/1/2013. This history suggests a postinflammatory/postinfectious etiology.  3. 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  1/16/14 Discussed multiple options for her based on relatively stable-appearing disease on CT but slight increase in  (which has had small increase to 20 with last recurrence) including chemo break with recheck of  in 1 month, starting new chemo agent immediately, and exploratory surgery with possible resection of nodules. She is considered platinum-sensitive based on > 1 year remission after Taxol/Carbo, which we will take into consideration for future chemo planning. I am not inclined to surgery at this time given difficulty she already has with diarrhea secondary to past colon resection. I suspect we would need to resect further bowel due to mesenteric disease. Also explained inherent risks of any major surgery. Also mentioned maintenance chemo, but this has not been shown to increase overall survival and would likely decrease her quality of life without significant benefit. Family is going on vacation to [place] in 2 weeks and Odalys does not want to have chemo prior to that, so will plan to take 1 month break. She can have  at that time (discussed checking toady since last draw was in early December, but as it would likely not change treatment plan and she has h/o slow rising , will not check today).  2/17/14  16    2/20/14: Decision to take break from chemo for two months, followed by CT and CA-125.    4/21/14  27  4/21/14 CT C/A/P Impression:    1. Increased size of 2 low-attenuation lymph nodes anterior to the right psoas muscle is concerning for worsening metastatic ovarian cancer.    2. New circumferential thickening of a 3.8 cm length segment of distal transverse colon is likely physiologic. Recommend attention on followup imaging.    3. Grossly unchanged size of clustered small  "nodules versus scarring in the right lower lobe the lungs.    4. Stable thrombosis of the right ovarian vein.  5/14/14: Diagnostic laparoscopy converted to exploratory laparotomy and removal of mesenteric masses, tumor debulking, peritoneal biopsies and intraperitoneal port placement. On laparoscopy, it was noted that there were small nodules on the anterior abdominal wall near the previous incision, small nodules on the right pelvic sidewall as well. Nodules were palpated in the mesentery; however, as it was unable to clarify where the origin of the nodules was, the decision was made to open the patient. On opening there was found to be approximately a 3 cm nodule in the small bowel mesentery and another separate approximately 2 cm nodule in the bowel mesentery. Pelvis without evidence of cancer, some mesenteric lymph nodes were palpated. No evidence otherwise of any disseminated cancer throughout the abdomen.    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 (2/2)  -Largest metastasis: 1.3 cm  -See comment  D: Peritoneum, right paracolic gutter #2, biopsy:  -Fibroadipose tissue with granulomatous inflammation surrounding refractile material  -Negative for malignancy   E: Small bowel adhesion, biopsy:  -Fibroconnective tissue, consistent with adhesion  -Negative for malignancy  F: Lymph nodes, mesentry, not otherwise specified, excision:  -Two lymph nodes, negative for metastatic carcinoma (0/2)  G: Lymph node, mesentery, \"#2\", excision:  -One lymph node, negative for metastatic carcinoma (0/1)  H: Lymph nodes, mesentery, \"nodule #2\", excision:  -Five lymph nodes, negative " for metastatic carcinoma (0/5)  COMMENT:  Some of the specimens show post-operative changes. Others show possible treatment related changes, including necrosis. The metastatic carcinoma in the mesenteric lymph nodes (specimen C) shows variable morphology, including relatively low grade tumor with papillary architecture, and high grade tumor comprised of nests of tumor cells with irregular, slit-like spaces and marked nuclear pleomorphism.    5/29/14: Cycle 1 IV PACLitaxel / IP CISplatin / IP PACLitaxel.  - 28.  6/26/14: Cycle #2 IV/IP.  10  8/5/14: CT chest/abd/pelvis IMPRESSION     1. In this patient with ovarian cancer, overall findings are indicative of stable/slight improvement, as multiple mesenteric lymphadenopathy and scattered nodular peritoneal soft tissue mass lesions appear unchanged or slightly smaller since 4/21/2014.    2. 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 Mild dilatation of the second and the third portion of the duodenum with a narrow SMA angle. This could represent SMA syndrome, if clinically correlated.17/14:    Cycle #3 IV/IP.  10.    CT chest/abd/pelvis with contrast on 8/5/14    Impression:    1. In this patient with ovarian cancer, overall findings are indicative of stable/slight improvement, as multiple mesenteric lymphadenopathy and scattered nodular peritoneal soft tissue mass lesions appear unchanged or slightly smaller since 4/21/2014.    2. 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 Mild dilatation of the second and the third portion of the duodenum with a narrow SMA angle. This could represent SMA syndrome, if clinically correlated.  8/7/14: Cycle #4 Taxol/Carbo (changed from IV/IP).  10. She has been feeling okay. She is unsure if she can finish out the course of 6 cycles IV/IP taxol/cisplatin. She feels like she has the flu for about a week then starts feeling gradually better after each chemo cycle. Her spouse notes  that she actually has been more sick with the treatments than she initially admits here. She was also previously having some rib pain. Denies any rib pain now. Denies any chest pain or shortness of breath.  Plan: discussed recent CT cap results and switching to just IV as she is feeling miserable with IP treatments. Switch to IV carbo/taxol as patient is platinum sensitive.  We also discussed her taking part of the tesaro trial, which would require BRCA testing. She would like to take part in this trial if eligible.  8/20/14: Remove Intraperitoneal Port ( Port and catheter intact - discarded)  8/28/14: Cycle #5 Taxol/Carbo held due to thrombocytopenia.  6.    She denies any vaginal bleeding, no changes in her bowel or bladder habits, no nausea/emesis, no lower extremity edema, and no difficulties eating or sleeping. She denies any abdominal discomfort/bloating, no fevers or chills, and no chest pain or shortness of breath. She states her diarrhea is the same. She reports some fatigue which improves about 1-2 weeks after her chemotherapy. She states she does not need any medication refills and she was told she does not meet the criteria for the TESARO trial. She states she has 3 bags of iv fluids left over from her previous chemotherapy and will give these to herself. She states she is ready for her treatment today.    9/29/14: Cycle #6 Taxol/Carbo  6. Insurance questions regarding GSF coverage today. No concerns other than fatigue. Taking iron for anemia and does not desire blood transfusion. Using neulasta for neutropenia. Using home IV hydration if needed. Baseline unchanged. No abdominal bloating, constipation, diarrhea, pain, vaginal or rectal bleeding, cough or dyspnea, fluid retention.    10/16/14: Impression:    1. Nodular peritoneal soft tissue mass in the right lower quadrant adjacent to the psoas muscle is no longer appreciated. Adjacent prominent lymphadenopathy is unchanged from previous  exam. No new peritoneal lesions.    2. 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    10/20/14:  5. CT chest/abdomen/pelvis on 10/16/14 showed nodular peritoneal soft tissue mass in the right lower quadrant adjacent to the psoas muscle is no longer appreciated. Adjacent prominent lymphadenopathy is unchanged from previous exam. 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  1/27/15:  6.  4/28/15:  14.  5/26/15:  18.  6/2/15: CT cap Impression:  1. Postsurgical changes of hysterectomy and bilateral salpingo-oophorectomy for ovarian cancer. There is a new 8 mm hazy, ill-defined hypoattenuating lesion in hepatic segment 6 which is suspicious for a metastatic deposit. Further evaluation with ultrasound in recommended.    2. Increased size of a left retroperitoneal lymph node which is indeterminate but may represent a ciro metastasis. Mildly prominent lymph nodes in the right lower quadrant are not significantly changed.  3. Moderate colonic stool burden.    6/4/15: US abdomen IMPRESSION:    Hyperechoic lesion in the right hepatic lobe, consistent with hemangioma. This does not corresponds to the area of the lesion seen on CT from 6/2/2015. An MR would be helpful for identifying and characterizing the lesion from the recent CT.  6/12/15: MR abd IMPRESSION:  1. New 20 x 11 mm enhancing lesions between the right obliques, concerning for metastatic disease. This lesions should be amenable to percutaneous biopsy, if indicated.  2. Correlating to the lesion visualized on comparison CT is a hepatic segment 6 subcapsular 7 mm lesion. Overall the appearance favors the diagnosis of a simple cyst. However, there is faint suggestion of mild peripheral arterial enhancement. Although this is favored as  artifactual, this should be followed up to confirm stability. Recommend 6 month followup.  3. Hepatic segment 6, 5 mm lesion too small to technically  characterize. Differential would favor FNH, less likely flash filling hemangioma. Recommend attention on followup.  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     9/1/15: Cycle #1 Avastin/Cytoxan.   31.     9/24/15:feeling generally well. She says she has been having back and stomach spasms. She is taking cytosine daily (she ran out yesterday). She also says its affecting her voice. Admits that it burns occasionally. She is eating and drinking normal. She also admit diarrhea, 5-7 times daily, lose/watery. She trying to stay hydrated and eat fiber. She also says that her body is sore, especially the bottom of her feet. Her blood pressure is normal. She also admits having headache after her first infusion.      9/24/15: Cycle #2 Avastin/Cytoxan.  19.  10/15/15: Cycle #3 Avastin/Cytoxan.  16.     11/6/15: CT c/a/p IMPRESSION:    1. Stable postoperative change of MAR/BSO for ovarian cancer.  2. The lesion in the right flank abdominal musculature is slightly decreased in size. Otherwise, stable examination.  2. No evidence of metastatic disease in the chest.    11/20/15: Treatment planning visit,  16    11/25/15: surgical pathology report  FINAL DIAGNOSIS:  Soft tissue, right oblique muscle mass, excision:  -Recurrent ovarian serous carcinoma  -Carcinoma is present less than 1 mm from one resection margin  -Background skeletal muscle and fibroadipose tissue    1/4/16:  22  1/11/16-1/27/16: Radiation to right flank x 12 treatments  4/7/2016:  94. CT cap IMPRESSION:    In this patient with ovarian cancer status post MAR/BSO and descending/transverse colectomy:  1. No evidence for malignancy in the chest, abdomen, or pelvis.  2. Stable small hypodense segment 6 liver lesion, appears more likely benign, possibly a cyst.  5/13/16:  124.  6/3/16: PET CT  IMPRESSION: In this patient with a history of ovarian cancer:  1. Hypermetabolic and enlarging periaortic and perihepatic lymphadenopathy compatible with metastatic disease, as detailed above.  2. Although hypodense lesion in hepatic segment 6 has been present since 6/2/2015 associated hypermetabolism makes this lesion highly concerning for metastatic disease.    Plan: to start Niraparib under TESARO study.     6/9/16:  137.  6/14/16: Cycle #1 Niraparib.    6/28/16: Cycle #1 D15 Niraparib.    7/5/16:  100.    7/11/16: Cycle #2 Nraparib.   83.    8/3/2016: PET CT IMPRESSION:    In this patient with known history of ovarian cancer:  1) New pleural based nodular opacities in the lateral and inferior aspects of the bilateral lower lobes, worse in the left lung. Likely infection. Close follow up is recommended.      2) Slight decrease in hypermetabolic abdominal lymphadenopathy. 2 hypermetabolic lymph nodes persist.  3) Unchanged right hepatic lobe metastatic lesion.     8/9/16: Cycle #3 Niraparib.  69.  9/6/16: Cycle #4 Niraparib.  53. Dose held due to anemia.  9/13/16: Eval for potential cycle 4 niraparib. Dose held due to anemia.  10/4/16: CT CAP impression:  IMPRESSION: In this patient with a known history of ovarian cancer:  1. There has been interval resolution of pleural-based nodular  opacities which likely represented infection.  2. Abdominal lymphadenopathy in the form of 2 portacaval lymph nodes  have not significantly changed in size, noted to be hypermetabolic on  prior PET/CT.  3. Previously demonstrated metastatic lesion in the right lobe of the  liver is not significantly changed.  10/11/16:  60  11/1/16: C6 niraparib,  75  11/29/16: C7 niraparib.  78. CT CAP impression as follows:  Target lesions (RECIST criteria):       A previously described target lesion superior to the head of the  pancreas (series 2, image 64)  (referred to as a perihepatic node  on  6/3/2016) may not be a valid target lesion because it measured less  than 1.5 cm originally. However, this particular node has decreased in  size, now measuring 7 mm in short axis versus 14 mm on 6/3/2016 when  measured in a similar fashion.       2.3 cm short axis portacaval lymph node on series 2 image 67,  previously 2.0 cm on 10/4/2016.       1.2 cm subtle hypodensity in hepatic segment 6 on series 2 image 75,  stable on multiple studies since at least 6/3/2016     Sum of diameters today: 3.5 cm. Sum of diameters 10/4/2016: 3.2 cm.  Growth = 9%.    12/27/16: C8 niraparib.  105.  1/25/17: C9 niraparib.  108.  2/23/17: C10 niraparib.  132.   CT CAP impression:  Sum of target lesion diameters today: 3.7 cm. Sum of target lesion  diameters on 11/28/2016: 3.5 cm. Growth= 6%  1. In this patient with history of ovarian cancer there is stable  disease by RECIST criteria as evidenced by:   1a. Mildly increased size of liver metastasis.  1b. Stable portacaval lymphadenopathy.  1c. No evidence of metastatic disease in the chest.  2. Trace emphysematous changes of the lungs.  3/22/17: C11 niraparib.  132.  4/19/17: C12 niraparib.  127.  5/16/17: CT CAP IMPRESSION: In this patient with ovarian cancer:  1. Mildly increased size of hepatic metastasis segment 6 with subtle increased capsular retraction.  2. Stable edmundo hepatis nodes, with mild increase in size of periaortic lymph nodes.  3. Prominent left supraclavicular lymph node with subtle increase in size compared to prior studies, particularly comparing to 10/4/2016.  4. Mild subtle groundglass opacities in the right upper lobe, not present on prior study, lesser extent in the right lower lobe.  Findings may represent infection, additional consideration is malignancy (less likely), and attention on follow-up study  Recommended.  Addendum:   Prominent left supraclavicular lymph node (3/25) is stable from most recent CT performed  2/20/2017, currently measuring 14 x 16 mm, previously 14 x 16 mm on 2/20/2017.      The portal caval lymph node/edmundo hepatis lymph node is stable from 2/20/2017, measuring 21 mm.      Clarification of size of the para-aortic lymph node (series 3 image 327). It short axis measurement is 11 mm versus 9 mm on prior study.      Hepatic segment 6 triangular-shaped low density lesion (3/362) measures 14 mm, previously measured 12 mm, demonstrating a  possible/questionable minimal subtle increase in size. Similarly the lymph nodes noted above in the supraclavicular and para-aortic regions demonstrate possible minimal possible subtle increase in size.      5/18/17: Cycle #13 Niraparib.  191.  6/15/17: Cycle #14 Niraparib.  146.  7/13/17: Cycle #15 Niraparib 200 mg.  171     CT (8/9/17):       IMPRESSION:  1. Segment 6 hepatic metastasis is stable to minimally increased in  size.  2. Slight increase in multicentric adenopathy, most pronounced at the  edmundo hepatis. Additional sites include the inferior left  neck/supraclavicular region and retroperitoneum which appear stable to  minimally increased.      8/10/17:  175  9/14/17: MUGA LVEF 54%  9/22/17: C1D1 carboplatin/Doxil.  187.  10/19/17: C2D1 carboplatin/Doxil    Past Medical History:   Diagnosis Date     Antiplatelet or antithrombotic long-term use      Ascites      Blood clot in the legs      Diabetes (H)      Ovarian cancer (H)     serous,stg IV     Pleural effusion      Pulmonary embolism (H) 2/2012     Refusal of blood transfusions as patient is Faith      Short gut syndrome      Subclinical hypothyroidism 4/18/2013     Thrombosis of leg        Past Surgical History:   Procedure Laterality Date     COLECTOMY       COLONOSCOPY  2/1/2012    Procedure:COLONOSCOPY; With Biopsy; Surgeon:TONI SULLIVAN; Location:[facility]     HYSTERECTOMY TOTAL ABD, RHIANNA SALPINGO-OOPHORECTOMY, NODE DISSECTION, TUMOR DEBULKING, COMBINED  2/1/2012     Procedure:COMBINED HYSTERECTOMY TOTAL ABDOMINAL, BILATERAL SALPINGO-OOPHORECTOMY, NODE DISSECTION, TUMOR DEBULKING;  Exploratory Laparotomy, Total Abdominal Hysterectomy, Bilateral Salpingo-Oophorectomy, appendectomy,lysis of adhesions, ileal, ascending, transverse and splenic flexure resection, ileal descending bowel renanastomosis, incidental cystotomy repair, CUSA procedure and colonoscopy ; [facility]     INSERT PORT PERITONEAL ACCESS  4/3/2012    Procedure:INSERT PORT PERITONEAL ACCESS; Intraperitoneal Port Placement (c-arm); Surgeon:SAMUEL CARRASCO; Location:[facility]     INSERT PORT PERITONEAL ACCESS  5/14/2014    Procedure: INSERT PORT PERITONEAL ACCESS;  Surgeon: Nga Yeung MD;  Location: [facility] OR     INSERT PORT VASCULAR ACCESS       LAPAROSCOPY DIAGNOSTIC (GYN)  5/14/2014    Procedure: LAPAROSCOPY DIAGNOSTIC (GYN);  Surgeon: Nga Yeung MD;  Location: [facility]     LAPAROTOMY EXPLORATORY Right 11/25/2015    Procedure: LAPAROTOMY EXPLORATORY;  Surgeon: Nga Yeung MD;  Location: [facility]     LAPAROTOMY, TUMOR DEBULKING, COMBINED  5/14/2014    Procedure: COMBINED LAPAROTOMY, TUMOR DEBULKING;  Surgeon: Nga Yeung MD;  Location: [facility]     REMOVE CATHETER PERITONEAL N/A 8/20/2014    Procedure: REMOVE CATHETER PERITONEAL;  Surgeon: Nga Yeung MD;  Location: [facility] OR     VASCULAR SURGERY      stent left iliac vein       Current Outpatient Prescriptions   Medication     LORazepam (ATIVAN) 1 MG tablet     prochlorperazine (COMPAZINE) 10 MG tablet     ferrous sulfate (IRON) 325 (65 FE) MG tablet     LEVOTHYROXINE SODIUM PO     order for DME     METFORMIN HCL PO     diphenoxylate-atropine (LOMOTIL) 2.5-0.025 MG per tablet     cyanocobalamin (VITAMIN B12) 1000 MCG/ML injection     magnesium oxide (MAG-OX) 400 MG tablet     ASPIRIN PO     potassium chloride (KLOR-CON) 20 MEQ packet     VITAMIN E NATURAL PO     Cholecalciferol (VITAMIN D PO)     HERBALS     Ascorbic Acid (VITAMIN C PO)      Calcium Carbonate-Vitamin D (CALCIUM + D PO)     No current facility-administered medications for this visit.      Facility-Administered Medications Ordered in Other Visits   Medication     heparin 100 UNIT/ML injection 500 Units          Allergies   Allergen Reactions     Nkda [No Known Drug Allergies]        Family History   Problem Relation Age of Onset     CANCER Mother 69     lung, smoker     CANCER Maternal Uncle 65     brain     Colon Cancer Maternal Aunt 80     colon       Social History     Social History     Marital status:      Spouse name: N/A     Number of children: N/A     Years of education: N/A     Occupational History     Not on file.     Social History Main Topics     Smoking status: Former Smoker     Years: 8.00     Types: Cigarettes     Quit date: 6/21/1980     Smokeless tobacco: Never Used      Comment: started at 11 yo and quit at 18 yo     Alcohol use Yes      Comment: 3x/day wine or jodi     Drug use: No     Sexual activity: Not on file     Other Topics Concern     Not on file     Social History Narrative       Review of Systems     Constitutional:  Positive for fatigue. Negative for fever, chills, weight loss, weight gain, decreased appetite, night sweats, recent stressors, height gain, height loss, post-operative complications, incisional pain, hallucinations, increased energy, hyperactivity and confused.   HENT:  Positive for tinnitus. Negative for ear pain, hearing loss, nosebleeds, trouble swallowing, hoarse voice, mouth sores, sore throat, ear discharge, tooth pain, gum tenderness, taste disturbance, smell disturbance, hearing aid, bleeding gums, dry mouth, sinus pain, sinus congestion and neck mass.    Eyes:  Negative for double vision, pain, redness, eye pain, decreased vision, eye watering, eye bulging, eye dryness, flashing lights, spots, floaters, strabismus, tunnel vision, jaundice and eye irritation.   Respiratory:   Negative for cough, hemoptysis, sputum production,  shortness of breath, wheezing, sleep disturbances due to breathing, snores loudly, respiratory pain, dyspnea on exertion, cough disturbing sleep and postural dyspnea.    Cardiovascular:  Negative for chest pain, dyspnea on exertion, palpitations, orthopnea, claudication, leg swelling, fingers/toes turn blue, hypertension, hypotension, syncope, history of heart murmur, chest pain on exertion, chest pain at rest, pacemaker, few scattered varicosities, leg pain, sleep disturbances due to breathing, tachycardia, light-headedness, exercise intolerance and edema.   Gastrointestinal:  Positive for diarrhea. Negative for heartburn, nausea, vomiting, abdominal pain, constipation, blood in stool, melena, rectal pain, bloating, hemorrhoids, bowel incontinence, jaundice, rectal bleeding, coffee ground emesis and change in stool.   Genitourinary:  Negative for bladder incontinence, dysuria, urgency, hematuria, flank pain, vaginal discharge, difficulty urinating, genital sores, dyspareunia, decreased libido, nocturia, voiding less frequently, arousal difficulty, abnormal vaginal bleeding, excessive menstruation, menstrual changes, hot flashes, vaginal dryness and postmenopausal bleeding.   Musculoskeletal:  Negative for myalgias, back pain, joint swelling, arthralgias, stiffness, muscle cramps, neck pain, bone pain, muscle weakness and fracture.   Skin:  Negative for nail changes, itching, poor wound healing, rash, hair changes, skin changes, acne, warts, poor wound healing, scarring, flaky skin, Raynaud's phenomenon, sensitivity to sunlight and skin thickening.   Neurological:  Negative for dizziness, tingling, tremors, speech change, seizures, loss of consciousness, weakness, light-headedness, numbness, headaches, disturbances in coordination, extremity numbness, memory loss, difficulty walking and paralysis.   Endo/Heme:  Negative for anemia, swollen glands and bruises/bleeds easily.   Psychiatric/Behavioral:  Negative for  "depression, hallucinations, memory loss, decreased concentration, mood swings and panic attacks.    Breast:  Negative for breast discharge, breast mass, breast pain and nipple retraction.   Endocrine:  Negative for altered temperature regulation, polyphagia, polydipsia, unwanted hair growth and change in facial hair.        Physical Exam:    /72  Pulse 82  Temp 98  F (36.7  C) (Oral)  Resp 16  Ht 1.651 m (5' 5\")  Wt 64.7 kg (142 lb 9.6 oz)  SpO2 99%  BMI 23.73 kg/m2    General: Alert non-toxic appearing female in no acute distress  HEENT: Normocephalic, atraumatic; PERRLA; no scleral icterus; oropharynx pink without lesions; neck supple without lymphadenopathy  Pulmonary: Lungs clear to auscultation, no increased work of breathing noted  Cardiac: Regular rate and rhythm, S1S2, no clicks, murmurs, rubs, or gallops; bilateral lower extremities without edema  GI: Normoactive bowel sounds x4 quadrants, abdomen soft, non-distended, and non-tender to palpation without masses or organomegaly  : Not indicated  Heme: Cervical, supraclavicular, and inguinal nodes without lymphadenopathy  MSK: Moves all extremities, no obvious muscle wasting  Neuro: No gross deficits, normal gait  Skin: Appropriate color for race, warm and dry, no rashes or lesions to unclothed skin; no palmar plantar erythrodysesthesia  Psych: Pleasant and interactive, affect bright, makes appropriate eye contact, thought process linear    Labs:      9/22/2017  Day 1   Hemoglobin 11.7 - 15.7 g/dL 12.3   Hematocrit 35.0 - 47.0 % 37.7   Platelet Count 150 - 450 10e9/L 251   Absolute Neutrophil 1.6 - 8.3 10e9/L 2.9   Sodium 133 - 144 mmol/L 139   Potassium 3.4 - 5.3 mmol/L 3.6   Chloride 94 - 109 mmol/L 106   Carbon Dioxide 20 - 32 mmol/L 26   Urea Nitrogen 7 - 30 mg/dL 18   Creatinine 0.52 - 1.04 mg/dL 0.75   Calcium 8.5 - 10.1 mg/dL 8.8   Magnesium 1.6 - 2.3 mg/dL 1.6   Bilirubin Total 0.2 - 1.3 mg/dL 0.5   ALT 0 - 50 U/L 23   AST 0 - 45 U/L 21 "   Alkaline Phosphatase 40 - 150 U/L 101   Albumin 3.4 - 5.0 g/dL 3.4   Protein Total 6.8 - 8.8 g/dL 7.2   WBC 4.0 - 11.0 10e9/L 5.8    pending      Assessment/Plan:  1) Recurrent stage IIIC bilateral ovarian cancer: Currently feeling well. Tolerating chemotherapy without dose limiting side effects, no apparent palmar plantar erythrodysesthesia. Proceed with C2D1 carboplatin/Doxil as ordered by Dr. Yeung. Reviewed risks of carboplatin reaction and s/sxs reaction and when to call her nurse/seek further care. Discussed Doxil precautions- to start icing her hands and feet QID x3 days post Doxil. Reviewed signs and symptoms for when she should contact the clinic or seek additional care, including but not limited to fever, chills, inability to keep down food or fluids, nausea and vomiting not controlled with antiemetics, and diarrhea leading to dehydration. Patient to contact the clinic with any questions or concerns in the interim. Tammy Flores RN in for teaching.   2) Genetic counseling: Testing has been performed and she is negative for mutations in BRCA1, BRCA2, EPCAM, MLH1, MSH2, MSH6, PMS2, PTEN, and TP53 genes  3) Health maintenance issues discussed include to continue following up with PCP for non-gynecologic concerns.  4) Patient verbalized understanding of and agreement with plan    A total of 20 minutes spent with patient, over 50% spent in counseling and coordination of care.    HAILE De Paz, FNP-C  Family Nurse Practitioner  Gynecologic Oncology  [facility]  Pager: [phone]

## 2017-10-20 ENCOUNTER — ONCOLOGY VISIT (OUTPATIENT)
Dept: ONCOLOGY | Facility: CLINIC | Age: 57
End: 2017-10-20
Attending: NURSE PRACTITIONER
Payer: COMMERCIAL

## 2017-10-20 ENCOUNTER — APPOINTMENT (OUTPATIENT)
Dept: LAB | Facility: CLINIC | Age: 57
End: 2017-10-20
Attending: OBSTETRICS & GYNECOLOGY
Payer: COMMERCIAL

## 2017-10-20 ENCOUNTER — INFUSION THERAPY VISIT (OUTPATIENT)
Dept: ONCOLOGY | Facility: CLINIC | Age: 57
End: 2017-10-20
Attending: OBSTETRICS & GYNECOLOGY
Payer: COMMERCIAL

## 2017-10-20 VITALS
TEMPERATURE: 98 F | RESPIRATION RATE: 16 BRPM | BODY MASS INDEX: 23.76 KG/M2 | SYSTOLIC BLOOD PRESSURE: 101 MMHG | OXYGEN SATURATION: 99 % | DIASTOLIC BLOOD PRESSURE: 72 MMHG | HEART RATE: 82 BPM | HEIGHT: 65 IN | WEIGHT: 142.6 LBS

## 2017-10-20 DIAGNOSIS — C56.2 OVARIAN CANCER, LEFT (H): ICD-10-CM

## 2017-10-20 DIAGNOSIS — Z79.899 ENCOUNTER FOR LONG-TERM (CURRENT) USE OF MEDICATIONS: ICD-10-CM

## 2017-10-20 DIAGNOSIS — D70.2 DRUG-INDUCED NEUTROPENIA (H): ICD-10-CM

## 2017-10-20 DIAGNOSIS — C56.1 OVARIAN CANCER, RIGHT (H): Primary | ICD-10-CM

## 2017-10-20 DIAGNOSIS — C56.1 OVARIAN CANCER, RIGHT (H): ICD-10-CM

## 2017-10-20 DIAGNOSIS — Z79.899 ENCOUNTER FOR LONG-TERM (CURRENT) USE OF MEDICATIONS: Primary | ICD-10-CM

## 2017-10-20 LAB
ALBUMIN SERPL-MCNC: 3.3 G/DL (ref 3.4–5)
ALP SERPL-CCNC: 105 U/L (ref 40–150)
ALT SERPL W P-5'-P-CCNC: 22 U/L (ref 0–50)
ANION GAP SERPL CALCULATED.3IONS-SCNC: 10 MMOL/L (ref 3–14)
AST SERPL W P-5'-P-CCNC: 16 U/L (ref 0–45)
BASOPHILS # BLD AUTO: 0 10E9/L (ref 0–0.2)
BASOPHILS NFR BLD AUTO: 0.2 %
BILIRUB SERPL-MCNC: 0.3 MG/DL (ref 0.2–1.3)
BUN SERPL-MCNC: 17 MG/DL (ref 7–30)
CALCIUM SERPL-MCNC: 8.5 MG/DL (ref 8.5–10.1)
CANCER AG125 SERPL-ACNC: 108 U/ML (ref 0–30)
CHLORIDE SERPL-SCNC: 106 MMOL/L (ref 94–109)
CO2 SERPL-SCNC: 23 MMOL/L (ref 20–32)
CREAT SERPL-MCNC: 0.66 MG/DL (ref 0.52–1.04)
DIFFERENTIAL METHOD BLD: ABNORMAL
EOSINOPHIL # BLD AUTO: 0.1 10E9/L (ref 0–0.7)
EOSINOPHIL NFR BLD AUTO: 1.2 %
ERYTHROCYTE [DISTWIDTH] IN BLOOD BY AUTOMATED COUNT: 14 % (ref 10–15)
GFR SERPL CREATININE-BSD FRML MDRD: >90 ML/MIN/1.7M2
GLUCOSE SERPL-MCNC: 100 MG/DL (ref 70–99)
HCT VFR BLD AUTO: 33.2 % (ref 35–47)
HGB BLD-MCNC: 11.3 G/DL (ref 11.7–15.7)
IMM GRANULOCYTES # BLD: 0 10E9/L (ref 0–0.4)
IMM GRANULOCYTES NFR BLD: 0.5 %
LYMPHOCYTES # BLD AUTO: 2 10E9/L (ref 0.8–5.3)
LYMPHOCYTES NFR BLD AUTO: 46.8 %
MAGNESIUM SERPL-MCNC: 1.5 MG/DL (ref 1.6–2.3)
MCH RBC QN AUTO: 35.3 PG (ref 26.5–33)
MCHC RBC AUTO-ENTMCNC: 34 G/DL (ref 31.5–36.5)
MCV RBC AUTO: 104 FL (ref 78–100)
MONOCYTES # BLD AUTO: 0.6 10E9/L (ref 0–1.3)
MONOCYTES NFR BLD AUTO: 15.1 %
NEUTROPHILS # BLD AUTO: 1.5 10E9/L (ref 1.6–8.3)
NEUTROPHILS NFR BLD AUTO: 36.2 %
NRBC # BLD AUTO: 0 10*3/UL
NRBC BLD AUTO-RTO: 0 /100
PLATELET # BLD AUTO: 227 10E9/L (ref 150–450)
POTASSIUM SERPL-SCNC: 4 MMOL/L (ref 3.4–5.3)
PROT SERPL-MCNC: 6.8 G/DL (ref 6.8–8.8)
RBC # BLD AUTO: 3.2 10E12/L (ref 3.8–5.2)
SODIUM SERPL-SCNC: 139 MMOL/L (ref 133–144)
WBC # BLD AUTO: 4.2 10E9/L (ref 4–11)

## 2017-10-20 PROCEDURE — 25000128 H RX IP 250 OP 636: Mod: ZF | Performed by: NURSE PRACTITIONER

## 2017-10-20 PROCEDURE — 99212 OFFICE O/P EST SF 10 MIN: CPT | Mod: ZF

## 2017-10-20 PROCEDURE — 83735 ASSAY OF MAGNESIUM: CPT | Performed by: NURSE PRACTITIONER

## 2017-10-20 PROCEDURE — 85025 COMPLETE CBC W/AUTO DIFF WBC: CPT | Performed by: NURSE PRACTITIONER

## 2017-10-20 PROCEDURE — 96417 CHEMO IV INFUS EACH ADDL SEQ: CPT

## 2017-10-20 PROCEDURE — 96375 TX/PRO/DX INJ NEW DRUG ADDON: CPT

## 2017-10-20 PROCEDURE — 96413 CHEMO IV INFUSION 1 HR: CPT

## 2017-10-20 PROCEDURE — 96368 THER/DIAG CONCURRENT INF: CPT

## 2017-10-20 PROCEDURE — 86304 IMMUNOASSAY TUMOR CA 125: CPT | Performed by: NURSE PRACTITIONER

## 2017-10-20 PROCEDURE — 25000128 H RX IP 250 OP 636: Mod: ZF | Performed by: OBSTETRICS & GYNECOLOGY

## 2017-10-20 PROCEDURE — 99213 OFFICE O/P EST LOW 20 MIN: CPT | Mod: ZP | Performed by: NURSE PRACTITIONER

## 2017-10-20 PROCEDURE — 80053 COMPREHEN METABOLIC PANEL: CPT | Performed by: NURSE PRACTITIONER

## 2017-10-20 RX ORDER — HEPARIN SODIUM (PORCINE) LOCK FLUSH IV SOLN 100 UNIT/ML 100 UNIT/ML
500 SOLUTION INTRAVENOUS EVERY 8 HOURS
Status: DISCONTINUED | OUTPATIENT
Start: 2017-10-20 | End: 2017-10-20 | Stop reason: HOSPADM

## 2017-10-20 RX ORDER — ALBUTEROL SULFATE 0.83 MG/ML
2.5 SOLUTION RESPIRATORY (INHALATION)
Status: CANCELLED | OUTPATIENT
Start: 2017-10-20

## 2017-10-20 RX ORDER — LORAZEPAM 2 MG/ML
1 INJECTION INTRAMUSCULAR EVERY 6 HOURS PRN
Status: CANCELLED
Start: 2017-10-20

## 2017-10-20 RX ORDER — MEPERIDINE HYDROCHLORIDE 25 MG/ML
25 INJECTION INTRAMUSCULAR; INTRAVENOUS; SUBCUTANEOUS EVERY 30 MIN PRN
Status: CANCELLED | OUTPATIENT
Start: 2017-10-20

## 2017-10-20 RX ORDER — DIPHENHYDRAMINE HYDROCHLORIDE 50 MG/ML
50 INJECTION INTRAMUSCULAR; INTRAVENOUS
Status: CANCELLED
Start: 2017-10-20

## 2017-10-20 RX ORDER — HEPARIN SODIUM (PORCINE) LOCK FLUSH IV SOLN 100 UNIT/ML 100 UNIT/ML
5 SOLUTION INTRAVENOUS
Status: COMPLETED | OUTPATIENT
Start: 2017-10-20 | End: 2017-10-20

## 2017-10-20 RX ORDER — SODIUM CHLORIDE 9 MG/ML
1000 INJECTION, SOLUTION INTRAVENOUS CONTINUOUS PRN
Status: CANCELLED
Start: 2017-10-20

## 2017-10-20 RX ORDER — METHYLPREDNISOLONE SODIUM SUCCINATE 125 MG/2ML
125 INJECTION, POWDER, LYOPHILIZED, FOR SOLUTION INTRAMUSCULAR; INTRAVENOUS
Status: CANCELLED
Start: 2017-10-20

## 2017-10-20 RX ORDER — EPINEPHRINE 1 MG/ML
0.3 INJECTION, SOLUTION, CONCENTRATE INTRAVENOUS EVERY 5 MIN PRN
Status: CANCELLED | OUTPATIENT
Start: 2017-10-20

## 2017-10-20 RX ORDER — HEPARIN SODIUM (PORCINE) LOCK FLUSH IV SOLN 100 UNIT/ML 100 UNIT/ML
500 SOLUTION INTRAVENOUS EVERY 8 HOURS
Status: CANCELLED
Start: 2017-10-20

## 2017-10-20 RX ORDER — ALBUTEROL SULFATE 90 UG/1
1-2 AEROSOL, METERED RESPIRATORY (INHALATION)
Status: CANCELLED
Start: 2017-10-20

## 2017-10-20 RX ORDER — EPINEPHRINE 0.3 MG/.3ML
INJECTION SUBCUTANEOUS
Status: DISCONTINUED
Start: 2017-10-20 | End: 2017-10-20 | Stop reason: WASHOUT

## 2017-10-20 RX ORDER — EPINEPHRINE 0.3 MG/.3ML
0.3 INJECTION SUBCUTANEOUS EVERY 5 MIN PRN
Status: CANCELLED | OUTPATIENT
Start: 2017-10-20

## 2017-10-20 RX ADMIN — CARBOPLATIN 595 MG: 10 INJECTION, SOLUTION INTRAVENOUS at 12:03

## 2017-10-20 RX ADMIN — SODIUM CHLORIDE, PRESERVATIVE FREE 5 ML: 5 INJECTION INTRAVENOUS at 08:44

## 2017-10-20 RX ADMIN — DEXTROSE 250 ML: 5 SOLUTION INTRAVENOUS at 10:21

## 2017-10-20 RX ADMIN — SODIUM CHLORIDE, PRESERVATIVE FREE 500 UNITS: 5 INJECTION INTRAVENOUS at 12:57

## 2017-10-20 RX ADMIN — MAGNESIUM SULFATE HEPTAHYDRATE: 500 INJECTION, SOLUTION INTRAMUSCULAR; INTRAVENOUS at 12:00

## 2017-10-20 RX ADMIN — DOXORUBICIN HYDROCHLORIDE 50 MG: 2 INJECTION, SUSPENSION, LIPOSOMAL INTRAVENOUS at 10:54

## 2017-10-20 RX ADMIN — DEXAMETHASONE SODIUM PHOSPHATE: 10 INJECTION, SOLUTION INTRAMUSCULAR; INTRAVENOUS at 10:21

## 2017-10-20 ASSESSMENT — PAIN SCALES - GENERAL: PAINLEVEL: NO PAIN (0)

## 2017-10-20 ASSESSMENT — ENCOUNTER SYMPTOMS: FATIGUE: 1

## 2017-10-20 NOTE — PATIENT INSTRUCTIONS
October 2017 Sunday Monday Tuesday Wednesday Thursday Friday Saturday   1     2     3     4     5     6     7       8     9     10     11     12     13     14       15     16     17     18     19     20     [facility]    8:30 AM   (15 min.)    [facility]RAW   [facility] Draw     [facility] RETURN ACTIVE TREATMENT    8:45 AM   (40 min.)   Patricia Rocha APRN CNP   [facility]     [facility] ONC INFUSION 240   10:00 AM   (240 min.)   [facility]ION   [facility] 21       22     23     24     25     26     27     28       29     30     31 November 2017 Sunday Monday Tuesday Wednesday Thursday Friday Saturday                  1     2     3     4       5     6     7     8     9     10     11       12     13     14     15     16     17     [facility]    8:30 AM   (15 min.)    [facility]     [facility] RETURN ACTIVE TREATMENT    8:45 AM   (40 min.)   Patricia Rocha APRN CNP   [facility]     [facility] ONC INFUSION 240   10:00 AM   (240 min.)    ONCOLOGY INFUSION   [facility] 18       19     20     21     22     23     24     25       26     27     28     29     30                            Lab Results:  Recent Results (from the past 12 hour(s))   CBC with platelets differential    Collection Time: 10/20/17  8:52 AM   Result Value Ref Range    WBC 4.2 4.0 - 11.0 10e9/L    RBC Count 3.20 (L) 3.8 - 5.2 10e12/L    Hemoglobin 11.3 (L) 11.7 - 15.7 g/dL    Hematocrit 33.2 (L) 35.0 - 47.0 %     (H) 78 - 100 fl    MCH 35.3 (H) 26.5 - 33.0 pg    MCHC 34.0 31.5 - 36.5 g/dL    RDW 14.0 10.0 - 15.0 %    Platelet Count 227 150 - 450 10e9/L    Diff Method Automated Method     % Neutrophils 36.2 %    % Lymphocytes 46.8 %    % Monocytes 15.1 %    % Eosinophils 1.2 %    % Basophils 0.2 %    % Immature Granulocytes 0.5 %    Nucleated RBCs 0 0 /100     Absolute Neutrophil 1.5 (L) 1.6 - 8.3 10e9/L    Absolute Lymphocytes 2.0 0.8 - 5.3 10e9/L    Absolute Monocytes 0.6 0.0 - 1.3 10e9/L    Absolute Eosinophils 0.1 0.0 - 0.7 10e9/L    Absolute Basophils 0.0 0.0 - 0.2 10e9/L    Abs Immature Granulocytes 0.0 0 - 0.4 10e9/L    Absolute Nucleated RBC 0.0    Comprehensive metabolic panel    Collection Time: 10/20/17  8:52 AM   Result Value Ref Range    Sodium 139 133 - 144 mmol/L    Potassium 4.0 3.4 - 5.3 mmol/L    Chloride 106 94 - 109 mmol/L    Carbon Dioxide 23 20 - 32 mmol/L    Anion Gap 10 3 - 14 mmol/L    Glucose 100 (H) 70 - 99 mg/dL    Urea Nitrogen 17 7 - 30 mg/dL    Creatinine 0.66 0.52 - 1.04 mg/dL    GFR Estimate >90 >60 mL/min/1.7m2    GFR Estimate If Black >90 >60 mL/min/1.7m2    Calcium 8.5 8.5 - 10.1 mg/dL    Bilirubin Total 0.3 0.2 - 1.3 mg/dL    Albumin 3.3 (L) 3.4 - 5.0 g/dL    Protein Total 6.8 6.8 - 8.8 g/dL    Alkaline Phosphatase 105 40 - 150 U/L    ALT 22 0 - 50 U/L    AST 16 0 - 45 U/L   Magnesium    Collection Time: 10/20/17  8:52 AM   Result Value Ref Range    Magnesium 1.5 (L) 1.6 - 2.3 mg/dL     Contact Numbers    [facility] [phone]  [facility] [place]:  [phone]    Call triage with chills and/or temperature greater than or equal to 100.5, uncontrolled nausea/vomiting, diarrhea, constipation, dizziness, shortness of breath, chest pain, bleeding, unexplained bruising, or any new/concerning symptoms, questions/concerns.     If you are having any concerning symptoms or wish to speak to a provider before your next infusion visit, please call your care coordinator or triage to notify them so we can adequately serve you.       After Hours: [phone]    If after hours, weekends, or holidays, call main hospital  and ask for Oncology doctor on call.

## 2017-10-20 NOTE — MR AVS SNAPSHOT
After Visit Summary   10/20/2017    Odalys Nathan    MRN: [id number]           Patient Information     Date Of Birth          1960        Visit Information        Provider Department      10/20/2017 9:00 AM Patricia Rocha APRN CNP [facility]        Today's Diagnoses     Ovarian cancer, right (H)    -  1    Ovarian cancer, left (H)        Drug-induced neutropenia (H)        Encounter for long-term (current) use of medications           Follow-ups after your visit        Your next 10 appointments already scheduled     Oct 20, 2017 10:00 AM CDT   Infusion 240 with [facility] ONCOLOGY INFUSION,  17 ATC   [facility] ([facility])    [address] [place] [address] [id number] [phone]            Nov 17, 2017  8:30 AM CST   [facility] Draw with  [facility] DRAW   [facility] Lab Draw ([facility])    [address] [place] [id number] [phone]            Nov 17, 2017  9:00 AM CST   (Arrive by 8:45 AM)   Return Active Treatment with HAILE De Paz CNP   [facility] ([facility])    [address] [place] [address] [id number] [phone]            Nov 17, 2017 10:00 AM CST   Infusion 240 with [facility] ONCOLOGY INFUSION,  17 ATC   [facility] ([facility])    [address] [place] [id number] [phone]              Who to contact     If you have questions or need follow up information about today's clinic visit or your schedule please contact [facility] directly at [phone].  Normal or non-critical lab and imaging results will be communicated to you by MyChart, letter or phone within 4 business days after the clinic has received the results. If you do not hear from us within 7 days, please contact  "the clinic through [employer] or phone. If you have a critical or abnormal lab result, we will notify you by phone as soon as possible.  Submit refill requests through [employer] or call your pharmacy and they will forward the refill request to us. Please allow 3 business days for your refill to be completed.          Additional Information About Your Visit        [facility] Information     [employer] gives you secure access to your electronic health record. If you see a primary care provider, you can also send messages to your care team and make appointments. If you have questions, please call your primary care clinic.  If you do not have a primary care provider, please call [phone] and they will assist you.        Care EveryWhere ID     This is your Care EveryWhere ID. This could be used by other organizations to access your [place] medical records  [id number]        Your Vitals Were     Pulse Temperature Respirations Height Pulse Oximetry BMI (Body Mass Index)    82 98  F (36.7  C) (Oral) 16 1.651 m (5' 5\") 99% 23.73 kg/m2       Blood Pressure from Last 3 Encounters:   10/20/17 101/72   09/22/17 120/65   08/10/17 139/86    Weight from Last 3 Encounters:   10/20/17 64.7 kg (142 lb 9.6 oz)   09/22/17 62.7 kg (138 lb 4.8 oz)   08/10/17 62.6 kg (138 lb 0.1 oz)              Today, you had the following     No orders found for display         Today's Medication Changes          These changes are accurate as of: 10/20/17  9:57 AM.  If you have any questions, ask your nurse or doctor.               These medicines have changed or have updated prescriptions.        Dose/Directions    cyanocobalamin 1000 MCG/ML injection   Commonly known as:  VITAMIN B12   This may have changed:  when to take this   Used for:  B12 deficiency        Dose:  1 mL   Inject 1 mL (1,000 mcg) into the muscle every 30 days   Quantity:  1 mL   Refills:  11       magnesium oxide 400 MG tablet   Commonly known as:  MAG-OX   This may have changed:  when " to take this   Used for:  Ovarian cancer, unspecified laterality (H)        Dose:  400 mg   Take 1 tablet (400 mg) by mouth 2 times daily   Quantity:  90 tablet   Refills:  3                Primary Care Provider Office Phone # Fax #    Aubrie Hester [phone]       [facility] [address]  [facility] [address]        Equal Access to Services     SANDY CHAMBERS : Hadii aad ku hadasho Soomaali, waaxda luqadaha, qaybta kaalmada adeegyada, waxay idiin hayaan adeeg khlizabeth laAndrestoryn ah. So [facility] [phone].    ATENCIÓN: Si habla español, tiene a bell disposición servicios gratuitos de asistencia lingüística. Liban al [phone].    We comply with applicable federal civil rights laws and [place] laws. We do not discriminate on the basis of race, color, national origin, age, disability, sex, sexual orientation, or gender identity.            Thank you!     Thank you for choosing [facility]  for your care. Our goal is always to provide you with excellent care. Hearing back from our patients is one way we can continue to improve our services. Please take a few minutes to complete the written survey that you may receive in the mail after your visit with us. Thank you!             Your Updated Medication List - Protect others around you: Learn how to safely use, store and throw away your medicines at www.disposemymeds.org.          This list is accurate as of: 10/20/17  9:57 AM.  Always use your most recent med list.                   Brand Name Dispense Instructions for use Diagnosis    ASPIRIN PO      Take 325 mg by mouth daily        CALCIUM + D PO      Take 1 tablet by mouth daily.    Pelvic mass       cyanocobalamin 1000 MCG/ML injection    VITAMIN B12    1 mL    Inject 1 mL (1,000 mcg) into the muscle every 30 days    B12 deficiency       diphenoxylate-atropine 2.5-0.025 MG per tablet    LOMOTIL    60 tablet    Take 2 tablets by mouth 4 times daily as needed for  diarrhea    Acute diarrhea       ferrous sulfate 325 (65 FE) MG tablet    IRON    90 tablet    Take 1 tablet (325 mg) by mouth 3 times daily (with meals) Take with small amount of orange juice, do not take with calcium    Ovarian cancer, right (H), Anemia, unspecified type, Ovarian cancer, left (H)       HERBALS      daily        LEVOTHYROXINE SODIUM PO      Take by mouth daily        LORazepam 1 MG tablet    ATIVAN    30 tablet    Take 1 tablet (1 mg) by mouth every 6 hours as needed (Anxiety, Nausea/Vomiting or Sleep)    Ovarian cancer, unspecified laterality (H)       magnesium oxide 400 MG tablet    MAG-OX    90 tablet    Take 1 tablet (400 mg) by mouth 2 times daily    Ovarian cancer, unspecified laterality (H)       METFORMIN HCL PO      Take 500 mg by mouth daily        order for DME     3 each    Injection Supplies for Vitamin B12: 3cc syringes w/ 27 gauge needles, 1/2 inch length    B12 deficiency       potassium chloride 20 MEQ Packet    KLOR-CON     Take 20 mEq by mouth daily        prochlorperazine 10 MG tablet    COMPAZINE    30 tablet    Take 1 tablet (10 mg) by mouth every 6 hours as needed (nausea/vomiting)    Encounter for long-term (current) use of medications, Ovarian cancer, right (H), Ovarian cancer, left (H), Drug-induced neutropenia (H)       VITAMIN C PO      Take 500 mg by mouth daily    Pelvic mass       VITAMIN D PO      Take 1,000 Units by mouth daily Unknown dose        VITAMIN E NATURAL PO      Take 100 Units by mouth daily

## 2017-10-20 NOTE — MR AVS SNAPSHOT
After Visit Summary   10/20/2017    Odalys Nathan    MRN: [id number]           Patient Information     Date Of Birth          1960        Visit Information        Provider Department      10/20/2017 10:00 AM UC 17 ATC; UC ONCOLOGY INFUSION [facility]        Today's Diagnoses     Encounter for long-term (current) use of medications    -  1    Ovarian cancer, right (H)        Ovarian cancer, left (H)        Drug-induced neutropenia (H)          Care Instructions          October 2017 Sunday Monday Tuesday Wednesday Thursday Friday Saturday   1     2     3     4     5     6     7       8     9     10     11     12     13     14       15     16     17     18     19     20     [facility]    8:30 AM   (15 min.)    [facility]   [facility] Lab Draw     [facility] RETURN ACTIVE TREATMENT    8:45 AM   (40 min.)   Patricia Rocha APRN CNP   [facility]     [facility] ONC INFUSION 240   10:00 AM   (240 min.)    ONCOLOGY INFUSION   [facility] 21 22     23     24     25     26     27     28       29     30     31 November 2017 Sunday Monday Tuesday Wednesday Thursday Friday Saturday                  1     2     3     4       5     6     7     8     9     10     11       12     13     14     15     16     17     [facility]    8:30 AM   (15 min.)    [facility] D[facility]   [facility] Lab Draw     [facility] RETURN ACTIVE TREATMENT    8:45 AM   (40 min.)   Patricia Rocha APRN CNP   [facility]     [facility] ONC INFUSION 240   10:00 AM   (240 min.)    ONCOLOGY INFUSION   [facility] 18       19     20     21     22     23     24     25       26     27     28     29     30                            Lab Results:  Recent Results (from the past 12 hour(s))   CBC with platelets differential    Collection Time: 10/20/17  8:52 AM   Result Value Ref Range     WBC 4.2 4.0 - 11.0 10e9/L    RBC Count 3.20 (L) 3.8 - 5.2 10e12/L    Hemoglobin 11.3 (L) 11.7 - 15.7 g/dL    Hematocrit 33.2 (L) 35.0 - 47.0 %     (H) 78 - 100 fl    MCH 35.3 (H) 26.5 - 33.0 pg    MCHC 34.0 31.5 - 36.5 g/dL    RDW 14.0 10.0 - 15.0 %    Platelet Count 227 150 - 450 10e9/L    Diff Method Automated Method     % Neutrophils 36.2 %    % Lymphocytes 46.8 %    % Monocytes 15.1 %    % Eosinophils 1.2 %    % Basophils 0.2 %    % Immature Granulocytes 0.5 %    Nucleated RBCs 0 0 /100    Absolute Neutrophil 1.5 (L) 1.6 - 8.3 10e9/L    Absolute Lymphocytes 2.0 0.8 - 5.3 10e9/L    Absolute Monocytes 0.6 0.0 - 1.3 10e9/L    Absolute Eosinophils 0.1 0.0 - 0.7 10e9/L    Absolute Basophils 0.0 0.0 - 0.2 10e9/L    Abs Immature Granulocytes 0.0 0 - 0.4 10e9/L    Absolute Nucleated RBC 0.0    Comprehensive metabolic panel    Collection Time: 10/20/17  8:52 AM   Result Value Ref Range    Sodium 139 133 - 144 mmol/L    Potassium 4.0 3.4 - 5.3 mmol/L    Chloride 106 94 - 109 mmol/L    Carbon Dioxide 23 20 - 32 mmol/L    Anion Gap 10 3 - 14 mmol/L    Glucose 100 (H) 70 - 99 mg/dL    Urea Nitrogen 17 7 - 30 mg/dL    Creatinine 0.66 0.52 - 1.04 mg/dL    GFR Estimate >90 >60 mL/min/1.7m2    GFR Estimate If Black >90 >60 mL/min/1.7m2    Calcium 8.5 8.5 - 10.1 mg/dL    Bilirubin Total 0.3 0.2 - 1.3 mg/dL    Albumin 3.3 (L) 3.4 - 5.0 g/dL    Protein Total 6.8 6.8 - 8.8 g/dL    Alkaline Phosphatase 105 40 - 150 U/L    ALT 22 0 - 50 U/L    AST 16 0 - 45 U/L   Magnesium    Collection Time: 10/20/17  8:52 AM   Result Value Ref Range    Magnesium 1.5 (L) 1.6 - 2.3 mg/dL     Contact Numbers    [facility] Main Line: [phone]  [facility] Triage:  [phone]    Call triage with chills and/or temperature greater than or equal to 100.5, uncontrolled nausea/vomiting, diarrhea, constipation, dizziness, shortness of breath, chest pain, bleeding, unexplained bruising, or any new/concerning symptoms, questions/concerns.     If you are having  any concerning symptoms or wish to speak to a provider before your next infusion visit, please call your care coordinator or triage to notify them so we can adequately serve you.       After Hours: [phone]    If after hours, weekends, or holidays, call main hospital  and ask for Oncology doctor on call.             Follow-ups after your visit        Your next 10 appointments already scheduled     Nov 17, 2017  8:30 AM CST   [facility] Draw with [facility] LAB DRAW   [facility] Lab Draw ([facility])    [address] [place] [id number] [phone]            Nov 17, 2017  9:00 AM CST   (Arrive by 8:45 AM)   Return Active Treatment with HAILE De Paz CNP   [facility] ([facility])    [address] [place] [id number] [phone]            Nov 17, 2017 10:00 AM CST   Infusion 240 with  ONCOLOGY INFUSION,  17 ATC   [facility] ([facility])    [address] [place] [id number] [phone]              Who to contact     If you have questions or need follow up information about today's clinic visit or your schedule please contact [facility] directly at [phone].  Normal or non-critical lab and imaging results will be communicated to you by MyChart, letter or phone within 4 business days after the clinic has received the results. If you do not hear from us within 7 days, please contact the clinic through MyChart or phone. If you have a critical or abnormal lab result, we will notify you by phone as soon as possible.  Submit refill requests through [employer] or call your pharmacy and they will forward the refill request to us. Please allow 3 business days for your refill to be completed.          Additional Information About Your Visit        MyChart Information     [employer]  gives you secure access to your electronic health record. If you see a primary care provider, you can also send messages to your care team and make appointments. If you have questions, please call your primary care clinic.  If you do not have a primary care provider, please call [phone] and they will assist you.        Care EveryWhere ID     This is your Care EveryWhere ID. This could be used by other organizations to access your [place] medical records  [id number]         Blood Pressure from Last 3 Encounters:   10/20/17 101/72   09/22/17 120/65   08/10/17 139/86    Weight from Last 3 Encounters:   10/20/17 64.7 kg (142 lb 9.6 oz)   09/22/17 62.7 kg (138 lb 4.8 oz)   08/10/17 62.6 kg (138 lb 0.1 oz)              We Performed the Following          CBC with platelets differential     Comprehensive metabolic panel     Magnesium          Today's Medication Changes          These changes are accurate as of: 10/20/17 10:00 AM.  If you have any questions, ask your nurse or doctor.               These medicines have changed or have updated prescriptions.        Dose/Directions    cyanocobalamin 1000 MCG/ML injection   Commonly known as:  VITAMIN B12   This may have changed:  when to take this   Used for:  B12 deficiency        Dose:  1 mL   Inject 1 mL (1,000 mcg) into the muscle every 30 days   Quantity:  1 mL   Refills:  11       magnesium oxide 400 MG tablet   Commonly known as:  MAG-OX   This may have changed:  when to take this   Used for:  Ovarian cancer, unspecified laterality (H)        Dose:  400 mg   Take 1 tablet (400 mg) by mouth 2 times daily   Quantity:  90 tablet   Refills:  3                Primary Care Provider Office Phone # Fax #    Aubrie Brooksjethro [phone]       [facility] [address] [address] [facility] [address]        Equal Access to Services     SANDY CHAMBERS AH: wil Tolentino qaybta kaalmada adeegyada, waxay idiin hayaan  marily esquivelmotoshia olivares'aan ah. So [facility] [phone].    ATENCIÓN: Si salas longoria, tiene a bell disposición servicios gratuitos de asistencia lingüística. Liban cabrera [phone].    We comply with applicable federal civil rights laws and [place] laws. We do not discriminate on the basis of race, color, national origin, age, disability, sex, sexual orientation, or gender identity.            Thank you!     Thank you for choosing [facility] CANCER [facility]  for your care. Our goal is always to provide you with excellent care. Hearing back from our patients is one way we can continue to improve our services. Please take a few minutes to complete the written survey that you may receive in the mail after your visit with us. Thank you!             Your Updated Medication List - Protect others around you: Learn how to safely use, store and throw away your medicines at www.disposemymeds.org.          This list is accurate as of: 10/20/17 10:00 AM.  Always use your most recent med list.                   Brand Name Dispense Instructions for use Diagnosis    ASPIRIN PO      Take 325 mg by mouth daily        CALCIUM + D PO      Take 1 tablet by mouth daily.    Pelvic mass       cyanocobalamin 1000 MCG/ML injection    VITAMIN B12    1 mL    Inject 1 mL (1,000 mcg) into the muscle every 30 days    B12 deficiency       diphenoxylate-atropine 2.5-0.025 MG per tablet    LOMOTIL    60 tablet    Take 2 tablets by mouth 4 times daily as needed for diarrhea    Acute diarrhea       ferrous sulfate 325 (65 FE) MG tablet    IRON    90 tablet    Take 1 tablet (325 mg) by mouth 3 times daily (with meals) Take with small amount of orange juice, do not take with calcium    Ovarian cancer, right (H), Anemia, unspecified type, Ovarian cancer, left (H)       HERBALS      daily        LEVOTHYROXINE SODIUM PO      Take by mouth daily        LORazepam 1 MG tablet    ATIVAN    30 tablet    Take 1 tablet (1 mg) by mouth every 6 hours as needed (Anxiety,  Nausea/Vomiting or Sleep)    Ovarian cancer, unspecified laterality (H)       magnesium oxide 400 MG tablet    MAG-OX    90 tablet    Take 1 tablet (400 mg) by mouth 2 times daily    Ovarian cancer, unspecified laterality (H)       METFORMIN HCL PO      Take 500 mg by mouth daily        order for DME     3 each    Injection Supplies for Vitamin B12: 3cc syringes w/ 27 gauge needles, 1/2 inch length    B12 deficiency       potassium chloride 20 MEQ Packet    KLOR-CON     Take 20 mEq by mouth daily        prochlorperazine 10 MG tablet    COMPAZINE    30 tablet    Take 1 tablet (10 mg) by mouth every 6 hours as needed (nausea/vomiting)    Encounter for long-term (current) use of medications, Ovarian cancer, right (H), Ovarian cancer, left (H), Drug-induced neutropenia (H)       VITAMIN C PO      Take 500 mg by mouth daily    Pelvic mass       VITAMIN D PO      Take 1,000 Units by mouth daily Unknown dose        VITAMIN E NATURAL PO      Take 100 Units by mouth daily

## 2017-10-20 NOTE — NURSING NOTE
"Chief Complaint   Patient presents with     Port Draw     labs drawn from port by rn.  vs taken   Port accessed with 20 gauge 3/4\" gripper needle and labs drawn by rn.  Port flushed with NS and heparin.  Pt tolerated well.  VS taken.  Pt checked in for next appt.  Peg Jauregui RN      "

## 2017-10-20 NOTE — LETTER
10/20/2017       RE: Odalys Nathan  [id number]  [facility] [place] [address]     Dear Colleague,    Thank you for referring your patient, Odalys Nathan, to the [facility]. Please see a copy of my visit note below.    Gynecologic Oncology Follow-Up Note  RE: Odalys Nathan  MRN: [id number]  : 1960  Date of Visit: 10/20/2017    CC: Odalys Nathan is a 57 year old year old female with recurrent stage IIIC bilateral ovarian cancer who presents today for disease management with Doxil.    HPI: Odalys comes to the clinic accompanied by her  Marcos and sister Carmen. She is feeling well today. Feels her hands may be slightly tender but denies any blistering or skin breakdown. Not icing her hands/feet and not using any cream such as Bag Balm or Udder Cream. Continues with fatigue, able to carry out ADLs and denies need for intervention. She continues on ferrous sulfate 325mg TID for history of anemia. She also continues with chronic diarrhea and tinnitus- denies need for intervention. Has had many cycles of carboplatin, no history of reaction thus far. She is eating and drinking well, reports she is ready for chemotherapy today.      Brief Oncology History:  2012 - Admitted to hospital for 2 weeks of intermittent abdominal cramping, distention, diarrhea and N/V. CT of abdomen/pelvis significant for small bowel obstruction, a heterogenous soft tissue density in the pelvis, omental nodules, and ascites. Bilateral adnexal masses per U/S of pelvis. CA-125 was elevated at 987, CEA was normal at 1.0.    12 - Therapeutic paracentesis (4 L) with cytology confirming malignancy (VA positive, weak ER positive, CK-7 positive consistent with GYN primary). Surgery recommended d/t potential for falling blood counts 2/2 chemotherapy (patient is Nondenominational and limiting blood transfusion)    12 - Exploratory laparotomy, MAR BSO, lysis of adhesion, Appendectomy,  Repair cystotomy, Omentectomy Iohc-hrpcfc-zyvwldvsw-transverse-colon resection, Ileo-descending colon anastomosis, CUSA, & Colonoscopy (done by Dr. Amato and colorectal team).  2/20/2012 - Admitted to hospital for bilateral pulmonary emboli and drainage of pleural effusion. Started on Lovenox.  2/28/12-3/21/12: Cycle #1-2 Carbo/Taxol IV  3/29/12 - Started on Keflex by her PCP for infection in her healing wound (immediately below her umbilicus).    4/11/12-6/14/12: Cycle #3-6 IV chemo, Cycle #1 IV/IP chemo  7/16/12 -  8, CT PRADEEP - enrolled in  - observation arm  3/4/13-6/28/13:  6, 9, 12, 15, 20.  7/1/13: CT Chest, Abdomen, Pelvis IMPRESSION:  1. Worsening metastatic ovarian carcinoma suggested by increased size of soft tissue nodules anterior to the right psoas muscle, that may represent growing mesenteric lymphadenopathy.  2. Remaining prominent right lower quadrant mesenteric lymph nodes are not significantly changed from CT 7/16/2012.  3. Clustered nodular opacities in the right lower lobe are not significant change from 7/16/2012 and remain indeterminate. Again, the appearance and distribution is suggestive of an infectious etiology.  4. Stable 8 mm soft tissue nodule in left breast, unchanged since at least 02/20/2012. This can be observed on followup studies, but correlation with mammography could be considered.  Decision made to start Doxil/Carbo.  7/11/13: The left ventricular ejection fraction is normal at 66.4%.  7/12/13-9/6/13: Cycle #1-3 Doxil/Carbo.  10, 11.    9/30/13 CT C/A/P Impression:  1. Overall, favorable response to treatment with decreasing size of soft tissue nodules tracking along the anterior aspect of the right psoas muscle.  2. 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  3. Improved cluster of right lower lobe pulmonary nodular opacities. These may represent resolving infection.  10/3/13-12/9/13:  9, 8, 10. Cycle 4-6 Doxil/Carbo.    1/13/14 CT C/A/P  Impression:   1. Stable appearance of metastatic ovarian cancer. Scattered soft tissue nodules along the anterior aspect of the right psoas muscle are unchanged in size. Mild mesenteric lymphadenopathy is unchanged.  2. Clustered micronodules in the right lower lobe are unchanged from 9/30/2013, but improved from 7/1/2013. This history suggests a postinflammatory/postinfectious etiology.  3. 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  1/16/14 Discussed multiple options for her based on relatively stable-appearing disease on CT but slight increase in  (which has had small increase to 20 with last recurrence) including chemo break with recheck of  in 1 month, starting new chemo agent immediately, and exploratory surgery with possible resection of nodules. She is considered platinum-sensitive based on > 1 year remission after Taxol/Carbo, which we will take into consideration for future chemo planning. I am not inclined to surgery at this time given difficulty she already has with diarrhea secondary to past colon resection. I suspect we would need to resect further bowel due to mesenteric disease. Also explained inherent risks of any major surgery. Also mentioned maintenance chemo, but this has not been shown to increase overall survival and would likely decrease her quality of life without significant benefit. Family is going on vacation to [place] in 2 weeks and Odalys does not want to have chemo prior to that, so will plan to take 1 month break. She can have  at that time (discussed checking toady since last draw was in early December, but as it would likely not change treatment plan and she has h/o slow rising , will not check today).  2/17/14  16    2/20/14: Decision to take break from chemo for two months, followed by CT and CA-125.    4/21/14  27  4/21/14 CT C/A/P Impression:    1. Increased size of 2 low-attenuation lymph nodes anterior to the right psoas muscle is  "concerning for worsening metastatic ovarian cancer.    2. New circumferential thickening of a 3.8 cm length segment of distal transverse colon is likely physiologic. Recommend attention on followup imaging.    3. Grossly unchanged size of clustered small nodules versus scarring in the right lower lobe the lungs.    4. Stable thrombosis of the right ovarian vein.  5/14/14: Diagnostic laparoscopy converted to exploratory laparotomy and removal of mesenteric masses, tumor debulking, peritoneal biopsies and intraperitoneal port placement. On laparoscopy, it was noted that there were small nodules on the anterior abdominal wall near the previous incision, small nodules on the right pelvic sidewall as well. Nodules were palpated in the mesentery; however, as it was unable to clarify where the origin of the nodules was, the decision was made to open the patient. On opening there was found to be approximately a 3 cm nodule in the small bowel mesentery and another separate approximately 2 cm nodule in the bowel mesentery. Pelvis without evidence of cancer, some mesenteric lymph nodes were palpated. No evidence otherwise of any disseminated cancer throughout the abdomen.    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 (2/2)  -Largest metastasis: 1.3 cm  -See comment  D: Peritoneum, right paracolic gutter #2, biopsy:  -Fibroadipose tissue with granulomatous inflammation surrounding refractile material  -Negative for malignancy   E: Small bowel adhesion, biopsy:  -Fibroconnective tissue, consistent with adhesion  -Negative for malignancy  F: Lymph nodes, mesentry, not otherwise " "specified, excision:  -Two lymph nodes, negative for metastatic carcinoma (0/2)  G: Lymph node, mesentery, \"#2\", excision:  -One lymph node, negative for metastatic carcinoma (0/1)  H: Lymph nodes, mesentery, \"nodule #2\", excision:  -Five lymph nodes, negative for metastatic carcinoma (0/5)  COMMENT:  Some of the specimens show post-operative changes. Others show possible treatment related changes, including necrosis. The metastatic carcinoma in the mesenteric lymph nodes (specimen C) shows variable morphology, including relatively low grade tumor with papillary architecture, and high grade tumor comprised of nests of tumor cells with irregular, slit-like spaces and marked nuclear pleomorphism.    5/29/14: Cycle 1 IV PACLitaxel / IP CISplatin / IP PACLitaxel.  - 28.  6/26/14: Cycle #2 IV/IP.  10  8/5/14: CT chest/abd/pelvis IMPRESSION     1. In this patient with ovarian cancer, overall findings are indicative of stable/slight improvement, as multiple mesenteric lymphadenopathy and scattered nodular peritoneal soft tissue mass lesions appear unchanged or slightly smaller since 4/21/2014.    2. 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 Mild dilatation of the second and the third portion of the duodenum with a narrow SMA angle. This could represent SMA syndrome, if clinically correlated.17/14:    Cycle #3 IV/IP.  10.    CT chest/abd/pelvis with contrast on 8/5/14    Impression:    1. In this patient with ovarian cancer, overall findings are indicative of stable/slight improvement, as multiple mesenteric lymphadenopathy and scattered nodular peritoneal soft tissue mass lesions appear unchanged or slightly smaller since 4/21/2014.    2. 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 Mild dilatation of the second and the third portion of the duodenum with a narrow SMA angle. This could represent SMA syndrome, if clinically correlated.  8/7/14: Cycle #4 Taxol/Carbo (changed " from IV/IP).  10. She has been feeling okay. She is unsure if she can finish out the course of 6 cycles IV/IP taxol/cisplatin. She feels like she has the flu for about a week then starts feeling gradually better after each chemo cycle. Her spouse notes that she actually has been more sick with the treatments than she initially admits here. She was also previously having some rib pain. Denies any rib pain now. Denies any chest pain or shortness of breath.  Plan: discussed recent CT cap results and switching to just IV as she is feeling miserable with IP treatments. Switch to IV carbo/taxol as patient is platinum sensitive.  We also discussed her taking part of the tesaro trial, which would require BRCA testing. She would like to take part in this trial if eligible.  8/20/14: Remove Intraperitoneal Port ( Port and catheter intact - discarded)  8/28/14: Cycle #5 Taxol/Carbo held due to thrombocytopenia.  6.    She denies any vaginal bleeding, no changes in her bowel or bladder habits, no nausea/emesis, no lower extremity edema, and no difficulties eating or sleeping. She denies any abdominal discomfort/bloating, no fevers or chills, and no chest pain or shortness of breath. She states her diarrhea is the same. She reports some fatigue which improves about 1-2 weeks after her chemotherapy. She states she does not need any medication refills and she was told she does not meet the criteria for the TESARO trial. She states she has 3 bags of iv fluids left over from her previous chemotherapy and will give these to herself. She states she is ready for her treatment today.    9/29/14: Cycle #6 Taxol/Carbo  6. Insurance questions regarding GSF coverage today. No concerns other than fatigue. Taking iron for anemia and does not desire blood transfusion. Using neulasta for neutropenia. Using home IV hydration if needed. Baseline unchanged. No abdominal bloating, constipation, diarrhea, pain, vaginal or rectal  bleeding, cough or dyspnea, fluid retention.    10/16/14: Impression:    1. Nodular peritoneal soft tissue mass in the right lower quadrant adjacent to the psoas muscle is no longer appreciated. Adjacent prominent lymphadenopathy is unchanged from previous exam. No new peritoneal lesions.    2. 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    10/20/14:  5. CT chest/abdomen/pelvis on 10/16/14 showed nodular peritoneal soft tissue mass in the right lower quadrant adjacent to the psoas muscle is no longer appreciated. Adjacent prominent lymphadenopathy is unchanged from previous exam. 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  1/27/15:  6.  4/28/15:  14.  5/26/15:  18.  6/2/15: CT cap Impression:  1. Postsurgical changes of hysterectomy and bilateral salpingo-oophorectomy for ovarian cancer. There is a new 8 mm hazy, ill-defined hypoattenuating lesion in hepatic segment 6 which is suspicious for a metastatic deposit. Further evaluation with ultrasound in recommended.    2. Increased size of a left retroperitoneal lymph node which is indeterminate but may represent a ciro metastasis. Mildly prominent lymph nodes in the right lower quadrant are not significantly changed.  3. Moderate colonic stool burden.    6/4/15: US abdomen IMPRESSION:    Hyperechoic lesion in the right hepatic lobe, consistent with hemangioma. This does not corresponds to the area of the lesion seen on CT from 6/2/2015. An MR would be helpful for identifying and characterizing the lesion from the recent CT.  6/12/15: MR abd IMPRESSION:  1. New 20 x 11 mm enhancing lesions between the right obliques, concerning for metastatic disease. This lesions should be amenable to percutaneous biopsy, if indicated.  2. Correlating to the lesion visualized on comparison CT is a hepatic segment 6 subcapsular 7 mm lesion. Overall the appearance favors the diagnosis of a simple cyst.  However, there is faint suggestion of mild peripheral arterial enhancement. Although this is favored as  artifactual, this should be followed up to confirm stability. Recommend 6 month followup.  3. Hepatic segment 6, 5 mm lesion too small to technically characterize. Differential would favor FNH, less likely flash filling hemangioma. Recommend attention on followup.  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     9/1/15: Cycle #1 Avastin/Cytoxan.   31.     9/24/15:feeling generally well. She says she has been having back and stomach spasms. She is taking cytosine daily (she ran out yesterday). She also says its affecting her voice. Admits that it burns occasionally. She is eating and drinking normal. She also admit diarrhea, 5-7 times daily, lose/watery. She trying to stay hydrated and eat fiber. She also says that her body is sore, especially the bottom of her feet. Her blood pressure is normal. She also admits having headache after her first infusion.      9/24/15: Cycle #2 Avastin/Cytoxan.  19.  10/15/15: Cycle #3 Avastin/Cytoxan.  16.     11/6/15: CT c/a/p IMPRESSION:    1. Stable postoperative change of MAR/BSO for ovarian cancer.  2. The lesion in the right flank abdominal musculature is slightly decreased in size. Otherwise, stable examination.  2. No evidence of metastatic disease in the chest.    11/20/15: Treatment planning visit,  16    11/25/15: surgical pathology report  FINAL DIAGNOSIS:  Soft tissue, right oblique muscle mass, excision:  -Recurrent ovarian serous carcinoma  -Carcinoma is present less than 1 mm from one resection margin  -Background skeletal muscle and fibroadipose tissue    1/4/16:  22  1/11/16-1/27/16: Radiation to right flank x 12 treatments  4/7/2016:  94. CT cap IMPRESSION:    In this patient with ovarian cancer  status post MAR/BSO and descending/transverse colectomy:  1. No evidence for malignancy in the chest, abdomen, or pelvis.  2. Stable small hypodense segment 6 liver lesion, appears more likely benign, possibly a cyst.  5/13/16:  124.  6/3/16: PET CT IMPRESSION: In this patient with a history of ovarian cancer:  1. Hypermetabolic and enlarging periaortic and perihepatic lymphadenopathy compatible with metastatic disease, as detailed above.  2. Although hypodense lesion in hepatic segment 6 has been present since 6/2/2015 associated hypermetabolism makes this lesion highly concerning for metastatic disease.    Plan: to start Niraparib under TESARO study.     6/9/16:  137.  6/14/16: Cycle #1 Niraparib.    6/28/16: Cycle #1 D15 Niraparib.    7/5/16:  100.    7/11/16: Cycle #2 Nraparib.   83.    8/3/2016: PET CT IMPRESSION:    In this patient with known history of ovarian cancer:  1) New pleural based nodular opacities in the lateral and inferior aspects of the bilateral lower lobes, worse in the left lung. Likely infection. Close follow up is recommended.      2) Slight decrease in hypermetabolic abdominal lymphadenopathy. 2 hypermetabolic lymph nodes persist.  3) Unchanged right hepatic lobe metastatic lesion.     8/9/16: Cycle #3 Niraparib.  69.  9/6/16: Cycle #4 Niraparib.  53. Dose held due to anemia.  9/13/16: Eval for potential cycle 4 niraparib. Dose held due to anemia.  10/4/16: CT CAP impression:  IMPRESSION: In this patient with a known history of ovarian cancer:  1. There has been interval resolution of pleural-based nodular  opacities which likely represented infection.  2. Abdominal lymphadenopathy in the form of 2 portacaval lymph nodes  have not significantly changed in size, noted to be hypermetabolic on  prior PET/CT.  3. Previously demonstrated metastatic lesion in the right lobe of the  liver is not significantly changed.  10/11/16:  60  11/1/16: C6 niraparib,   75  11/29/16: C7 niraparib.  78. CT CAP impression as follows:  Target lesions (RECIST criteria):       A previously described target lesion superior to the head of the  pancreas (series 2, image 64)  (referred to as a perihepatic node on  6/3/2016) may not be a valid target lesion because it measured less  than 1.5 cm originally. However, this particular node has decreased in  size, now measuring 7 mm in short axis versus 14 mm on 6/3/2016 when  measured in a similar fashion.       2.3 cm short axis portacaval lymph node on series 2 image 67,  previously 2.0 cm on 10/4/2016.       1.2 cm subtle hypodensity in hepatic segment 6 on series 2 image 75,  stable on multiple studies since at least 6/3/2016     Sum of diameters today: 3.5 cm. Sum of diameters 10/4/2016: 3.2 cm.  Growth = 9%.    12/27/16: C8 niraparib.  105.  1/25/17: C9 niraparib.  108.  2/23/17: C10 niraparib.  132.   CT CAP impression:  Sum of target lesion diameters today: 3.7 cm. Sum of target lesion  diameters on 11/28/2016: 3.5 cm. Growth= 6%  1. In this patient with history of ovarian cancer there is stable  disease by RECIST criteria as evidenced by:   1a. Mildly increased size of liver metastasis.  1b. Stable portacaval lymphadenopathy.  1c. No evidence of metastatic disease in the chest.  2. Trace emphysematous changes of the lungs.  3/22/17: C11 niraparib.  132.  4/19/17: C12 niraparib.  127.  5/16/17: CT CAP IMPRESSION: In this patient with ovarian cancer:  1. Mildly increased size of hepatic metastasis segment 6 with subtle increased capsular retraction.  2. Stable edmundo hepatis nodes, with mild increase in size of periaortic lymph nodes.  3. Prominent left supraclavicular lymph node with subtle increase in size compared to prior studies, particularly comparing to 10/4/2016.  4. Mild subtle groundglass opacities in the right upper lobe, not present on prior study, lesser extent in the right lower  lobe.  Findings may represent infection, additional consideration is malignancy (less likely), and attention on follow-up study  Recommended.  Addendum:   Prominent left supraclavicular lymph node (3/25) is stable from most recent CT performed 2/20/2017, currently measuring 14 x 16 mm, previously 14 x 16 mm on 2/20/2017.      The portal caval lymph node/edmundo hepatis lymph node is stable from 2/20/2017, measuring 21 mm.      Clarification of size of the para-aortic lymph node (series 3 image 327). It short axis measurement is 11 mm versus 9 mm on prior study.      Hepatic segment 6 triangular-shaped low density lesion (3/362) measures 14 mm, previously measured 12 mm, demonstrating a  possible/questionable minimal subtle increase in size. Similarly the lymph nodes noted above in the supraclavicular and para-aortic regions demonstrate possible minimal possible subtle increase in size.      5/18/17: Cycle #13 Niraparib.  191.  6/15/17: Cycle #14 Niraparib.  146.  7/13/17: Cycle #15 Niraparib 200 mg.  171     CT (8/9/17):       IMPRESSION:  1. Segment 6 hepatic metastasis is stable to minimally increased in  size.  2. Slight increase in multicentric adenopathy, most pronounced at the  edmundo hepatis. Additional sites include the inferior left  neck/supraclavicular region and retroperitoneum which appear stable to  minimally increased.      8/10/17:  175  9/14/17: MUGA LVEF 54%  9/22/17: C1D1 carboplatin/Doxil.  187.  10/19/17: C2D1 carboplatin/Doxil    Past Medical History:   Diagnosis Date     Antiplatelet or antithrombotic long-term use      Ascites      Blood clot in the legs      Diabetes (H)      Ovarian cancer (H)     serous,stg IV     Pleural effusion      Pulmonary embolism (H) 2/2012     Refusal of blood transfusions as patient is Rastafarian      Short gut syndrome      Subclinical hypothyroidism 4/18/2013     Thrombosis of leg        Past Surgical History:   Procedure  Laterality Date     COLECTOMY       COLONOSCOPY  2/1/2012    Procedure:COLONOSCOPY; With Biopsy; Surgeon:TONI SULLIVAN; Location:[facility]     HYSTERECTOMY TOTAL ABD, RHIANNA SALPINGO-OOPHORECTOMY, NODE DISSECTION, TUMOR DEBULKING, COMBINED  2/1/2012    Procedure:COMBINED HYSTERECTOMY TOTAL ABDOMINAL, BILATERAL SALPINGO-OOPHORECTOMY, NODE DISSECTION, TUMOR DEBULKING;  Exploratory Laparotomy, Total Abdominal Hysterectomy, Bilateral Salpingo-Oophorectomy, appendectomy,lysis of adhesions, ileal, ascending, transverse and splenic flexure resection, ileal descending bowel renanastomosis, incidental cystotomy repair, CUSA procedure and colonoscopy ; [facility]     INSERT PORT PERITONEAL ACCESS  4/3/2012    Procedure:INSERT PORT PERITONEAL ACCESS; Intraperitoneal Port Placement (c-arm); Surgeon:SAMUEL CARRASCO; Location:[facility]     INSERT PORT PERITONEAL ACCESS  5/14/2014    Procedure: INSERT PORT PERITONEAL ACCESS;  Surgeon: Nga Yeung MD;  Location: [facility] OR     INSERT PORT VASCULAR ACCESS       LAPAROSCOPY DIAGNOSTIC (GYN)  5/14/2014    Procedure: LAPAROSCOPY DIAGNOSTIC (GYN);  Surgeon: Nga Yeung MD;  Location: [facility] OR     LAPAROTOMY EXPLORATORY Right 11/25/2015    Procedure: LAPAROTOMY EXPLORATORY;  Surgeon: gNa Yeung MD;  Location: [facility] OR     LAPAROTOMY, TUMOR DEBULKING, COMBINED  5/14/2014    Procedure: COMBINED LAPAROTOMY, TUMOR DEBULKING;  Surgeon: Nga Yeung MD;  Location: [facility] OR     REMOVE CATHETER PERITONEAL N/A 8/20/2014    Procedure: REMOVE CATHETER PERITONEAL;  Surgeon: Nga Yeung MD;  Location: [facility] OR     VASCULAR SURGERY      stent left iliac vein       Current Outpatient Prescriptions   Medication     LORazepam (ATIVAN) 1 MG tablet     prochlorperazine (COMPAZINE) 10 MG tablet     ferrous sulfate (IRON) 325 (65 FE) MG tablet     LEVOTHYROXINE SODIUM PO     order for DME     METFORMIN HCL PO     diphenoxylate-atropine (LOMOTIL) 2.5-0.025 MG per tablet     cyanocobalamin  (VITAMIN B12) 1000 MCG/ML injection     magnesium oxide (MAG-OX) 400 MG tablet     ASPIRIN PO     potassium chloride (KLOR-CON) 20 MEQ packet     VITAMIN E NATURAL PO     Cholecalciferol (VITAMIN D PO)     HERBALS     Ascorbic Acid (VITAMIN C PO)     Calcium Carbonate-Vitamin D (CALCIUM + D PO)     No current facility-administered medications for this visit.      Facility-Administered Medications Ordered in Other Visits   Medication     heparin 100 UNIT/ML injection 500 Units          Allergies   Allergen Reactions     Nkda [No Known Drug Allergies]        Family History   Problem Relation Age of Onset     CANCER Mother 69     lung, smoker     CANCER Maternal Uncle 65     brain     Colon Cancer Maternal Aunt 80     colon       Social History     Social History     Marital status:      Spouse name: N/A     Number of children: N/A     Years of education: N/A     Occupational History     Not on file.     Social History Main Topics     Smoking status: Former Smoker     Years: 8.00     Types: Cigarettes     Quit date: 6/21/1980     Smokeless tobacco: Never Used      Comment: started at 9 yo and quit at 16 yo     Alcohol use Yes      Comment: 3x/day wine or jodi     Drug use: No     Sexual activity: Not on file     Other Topics Concern     Not on file     Social History Narrative       Review of Systems     Constitutional:  Positive for fatigue. Negative for fever, chills, weight loss, weight gain, decreased appetite, night sweats, recent stressors, height gain, height loss, post-operative complications, incisional pain, hallucinations, increased energy, hyperactivity and confused.   HENT:  Positive for tinnitus. Negative for ear pain, hearing loss, nosebleeds, trouble swallowing, hoarse voice, mouth sores, sore throat, ear discharge, tooth pain, gum tenderness, taste disturbance, smell disturbance, hearing aid, bleeding gums, dry mouth, sinus pain, sinus congestion and neck mass.    Eyes:  Negative for double  vision, pain, redness, eye pain, decreased vision, eye watering, eye bulging, eye dryness, flashing lights, spots, floaters, strabismus, tunnel vision, jaundice and eye irritation.   Respiratory:   Negative for cough, hemoptysis, sputum production, shortness of breath, wheezing, sleep disturbances due to breathing, snores loudly, respiratory pain, dyspnea on exertion, cough disturbing sleep and postural dyspnea.    Cardiovascular:  Negative for chest pain, dyspnea on exertion, palpitations, orthopnea, claudication, leg swelling, fingers/toes turn blue, hypertension, hypotension, syncope, history of heart murmur, chest pain on exertion, chest pain at rest, pacemaker, few scattered varicosities, leg pain, sleep disturbances due to breathing, tachycardia, light-headedness, exercise intolerance and edema.   Gastrointestinal:  Positive for diarrhea. Negative for heartburn, nausea, vomiting, abdominal pain, constipation, blood in stool, melena, rectal pain, bloating, hemorrhoids, bowel incontinence, jaundice, rectal bleeding, coffee ground emesis and change in stool.   Genitourinary:  Negative for bladder incontinence, dysuria, urgency, hematuria, flank pain, vaginal discharge, difficulty urinating, genital sores, dyspareunia, decreased libido, nocturia, voiding less frequently, arousal difficulty, abnormal vaginal bleeding, excessive menstruation, menstrual changes, hot flashes, vaginal dryness and postmenopausal bleeding.   Musculoskeletal:  Negative for myalgias, back pain, joint swelling, arthralgias, stiffness, muscle cramps, neck pain, bone pain, muscle weakness and fracture.   Skin:  Negative for nail changes, itching, poor wound healing, rash, hair changes, skin changes, acne, warts, poor wound healing, scarring, flaky skin, Raynaud's phenomenon, sensitivity to sunlight and skin thickening.   Neurological:  Negative for dizziness, tingling, tremors, speech change, seizures, loss of consciousness, weakness,  "light-headedness, numbness, headaches, disturbances in coordination, extremity numbness, memory loss, difficulty walking and paralysis.   Endo/Heme:  Negative for anemia, swollen glands and bruises/bleeds easily.   Psychiatric/Behavioral:  Negative for depression, hallucinations, memory loss, decreased concentration, mood swings and panic attacks.    Breast:  Negative for breast discharge, breast mass, breast pain and nipple retraction.   Endocrine:  Negative for altered temperature regulation, polyphagia, polydipsia, unwanted hair growth and change in facial hair.        Physical Exam:    /72  Pulse 82  Temp 98  F (36.7  C) (Oral)  Resp 16  Ht 1.651 m (5' 5\")  Wt 64.7 kg (142 lb 9.6 oz)  SpO2 99%  BMI 23.73 kg/m2    General: Alert non-toxic appearing female in no acute distress  HEENT: Normocephalic, atraumatic; PERRLA; no scleral icterus; oropharynx pink without lesions; neck supple without lymphadenopathy  Pulmonary: Lungs clear to auscultation, no increased work of breathing noted  Cardiac: Regular rate and rhythm, S1S2, no clicks, murmurs, rubs, or gallops; bilateral lower extremities without edema  GI: Normoactive bowel sounds x4 quadrants, abdomen soft, non-distended, and non-tender to palpation without masses or organomegaly  : Not indicated  Heme: Cervical, supraclavicular, and inguinal nodes without lymphadenopathy  MSK: Moves all extremities, no obvious muscle wasting  Neuro: No gross deficits, normal gait  Skin: Appropriate color for race, warm and dry, no rashes or lesions to unclothed skin; no palmar plantar erythrodysesthesia  Psych: Pleasant and interactive, affect bright, makes appropriate eye contact, thought process linear    Labs:      9/22/2017  Day 1   Hemoglobin 11.7 - 15.7 g/dL 12.3   Hematocrit 35.0 - 47.0 % 37.7   Platelet Count 150 - 450 10e9/L 251   Absolute Neutrophil 1.6 - 8.3 10e9/L 2.9   Sodium 133 - 144 mmol/L 139   Potassium 3.4 - 5.3 mmol/L 3.6   Chloride 94 - 109 " mmol/L 106   Carbon Dioxide 20 - 32 mmol/L 26   Urea Nitrogen 7 - 30 mg/dL 18   Creatinine 0.52 - 1.04 mg/dL 0.75   Calcium 8.5 - 10.1 mg/dL 8.8   Magnesium 1.6 - 2.3 mg/dL 1.6   Bilirubin Total 0.2 - 1.3 mg/dL 0.5   ALT 0 - 50 U/L 23   AST 0 - 45 U/L 21   Alkaline Phosphatase 40 - 150 U/L 101   Albumin 3.4 - 5.0 g/dL 3.4   Protein Total 6.8 - 8.8 g/dL 7.2   WBC 4.0 - 11.0 10e9/L 5.8    pending      Assessment/Plan:  1) Recurrent stage IIIC bilateral ovarian cancer: Currently feeling well. Tolerating chemotherapy without dose limiting side effects, no apparent palmar plantar erythrodysesthesia. Proceed with C2D1 carboplatin/Doxil as ordered by Dr. Yeung. Reviewed risks of carboplatin reaction and s/sxs reaction and when to call her nurse/seek further care. Discussed Doxil precautions- to start icing her hands and feet QID x3 days post Doxil. Reviewed signs and symptoms for when she should contact the clinic or seek additional care, including but not limited to fever, chills, inability to keep down food or fluids, nausea and vomiting not controlled with antiemetics, and diarrhea leading to dehydration. Patient to contact the clinic with any questions or concerns in the interim. Tammy Flores RN in for teaching.   2) Genetic counseling: Testing has been performed and she is negative for mutations in BRCA1, BRCA2, EPCAM, MLH1, MSH2, MSH6, PMS2, PTEN, and TP53 genes  3) Health maintenance issues discussed include to continue following up with PCP for non-gynecologic concerns.  4) Patient verbalized understanding of and agreement with plan    A total of 20 minutes spent with patient, over 50% spent in counseling and coordination of care.    HAILE De Paz, FNP-C  Family Nurse Practitioner  Gynecologic Oncology  [facility]  Pager: [phone]

## 2017-10-20 NOTE — PROGRESS NOTES
Infusion Nursing Note:  Odalys Nathan presents today for Cycle 2 Day 1 Doxil, Carboplatin (#13) and Mag replacement.    Patient seen by provider today: Yes: Patricia BE prior to infusion.    Treatment Conditions:  Lab Results   Component Value Date    HGB 11.3 10/20/2017     Lab Results   Component Value Date    WBC 4.2 10/20/2017      Lab Results   Component Value Date    ANEU 1.5 10/20/2017     Lab Results   Component Value Date     10/20/2017      Lab Results   Component Value Date     10/20/2017                   Lab Results   Component Value Date    POTASSIUM 4.0 10/20/2017           Lab Results   Component Value Date    MAG 1.5 10/20/2017            Lab Results   Component Value Date    CR 0.66 10/20/2017                   Lab Results   Component Value Date    JOSE ALBERTO 8.5 10/20/2017                Lab Results   Component Value Date    BILITOTAL 0.3 10/20/2017           Lab Results   Component Value Date    ALBUMIN 3.3 10/20/2017                    Lab Results   Component Value Date    ALT 22 10/20/2017           Lab Results   Component Value Date    AST 16 10/20/2017     Results reviewed, labs MET treatment parameters, ok to proceed with treatment.  Magnesium 1.5. Orders placed per protocol to replace Mg IV piggy back.  ECHO/MUGA completed 9/2017. EF = 54%.    Intravenous Access:  Implanted Port.    Note: Odalys arrived with her  and sister today in a pleasant mood. She denies the need for any refills and has no concerns proceeding with treatment today.      Post Infusion Assessment:  Patient tolerated infusion without incident.  Patient observed for 30 minutes post Carboplatin per protocol.  Blood return noted pre and post infusion.  Site patent and intact, free from redness, edema or discomfort.  No evidence of extravasations.  Access discontinued per protocol.    Discharge Plan:   Patient declined prescription refills.  Discharge instructions reviewed with: Patient and  Family.  Patient and/or family verbalized understanding of discharge instructions and all questions answered.  Copy of AVS reviewed with patient and/or family.  Patient will return 11/17/17 for next appointment.  Patient discharged in stable condition accompanied by:  and sister.  Departure Mode: Ambulatory.    Toya Carmona RN

## 2017-10-22 DIAGNOSIS — D64.9 ANEMIA, UNSPECIFIED TYPE: ICD-10-CM

## 2017-10-22 DIAGNOSIS — C56.2 OVARIAN CANCER, LEFT (H): ICD-10-CM

## 2017-10-22 DIAGNOSIS — C56.1 OVARIAN CANCER, RIGHT (H): ICD-10-CM

## 2017-10-23 RX ORDER — FERROUS SULFATE 324(65)MG
TABLET, DELAYED RELEASE (ENTERIC COATED) ORAL
Qty: 90 TABLET | Refills: 2 | Status: SHIPPED | OUTPATIENT
Start: 2017-10-23 | End: 2018-02-02

## 2017-10-23 NOTE — TELEPHONE ENCOUNTER
patient calling for refill of ferrous sulfate for the patient  Last visit with MD 10/20/17    Last order date   ferrous sulfate (IRON) 325 (65 FE) MG tablet 90 tablet 3 6/6/2017  No   Sig: Take 1 tablet (325 mg) by mouth 3 times daily (with meals) Take with small amount of orange juice, do not take with calcium       Please advise on refills for patient

## 2017-10-26 NOTE — PROGRESS NOTES
Reviewed Ok to start chemo with Penelope CISNEROS RN, OCN  Care Coordinator   Gynecologic Cancer   Office [phone]  Pager [phone]

## 2017-11-08 ENCOUNTER — TELEPHONE (OUTPATIENT)
Dept: ONCOLOGY | Facility: CLINIC | Age: 57
End: 2017-11-08

## 2017-11-08 DIAGNOSIS — R30.0 DYSURIA: Primary | ICD-10-CM

## 2017-11-08 DIAGNOSIS — R30.0 DYSURIA: ICD-10-CM

## 2017-11-08 LAB
ALBUMIN UR-MCNC: 30 MG/DL
APPEARANCE UR: ABNORMAL
BILIRUB UR QL STRIP: NEGATIVE
COLOR UR AUTO: YELLOW
GLUCOSE UR STRIP-MCNC: NEGATIVE MG/DL
HGB UR QL STRIP: ABNORMAL
KETONES UR STRIP-MCNC: NEGATIVE MG/DL
LEUKOCYTE ESTERASE UR QL STRIP: NEGATIVE
MUCOUS THREADS #/AREA URNS LPF: PRESENT /LPF
NITRATE UR QL: NEGATIVE
NON-SQ EPI CELLS #/AREA URNS LPF: ABNORMAL /LPF
PH UR STRIP: 5.5 PH (ref 5–7)
RBC #/AREA URNS AUTO: ABNORMAL /HPF
SOURCE: ABNORMAL
SP GR UR STRIP: 1.02 (ref 1–1.03)
UROBILINOGEN UR STRIP-ACNC: 0.2 EU/DL (ref 0.2–1)
WBC #/AREA URNS AUTO: ABNORMAL /HPF

## 2017-11-08 PROCEDURE — 81001 URINALYSIS AUTO W/SCOPE: CPT | Performed by: FAMILY MEDICINE

## 2017-11-08 RX ORDER — SULFAMETHOXAZOLE/TRIMETHOPRIM 800-160 MG
1 TABLET ORAL 2 TIMES DAILY
Qty: 14 TABLET | Refills: 0 | Status: SHIPPED | OUTPATIENT
Start: 2017-11-08 | End: 2018-02-09

## 2017-11-08 NOTE — TELEPHONE ENCOUNTER
Called Odalys to discuss UA results- no signs of infection but positive for RBCs. Could potentially be the start of a UTI- notes dysuria at the end of her stream, frequency, and feeling like she has to void but voiding only small amounts at a time. States symptoms are consistent with prior UTIs. To push fluids this evening. If symptoms persist tomorrow, to start Bactrim DS 1 tab BID x7 days as she is on treatment and is heading out of town for the weekend. To update me via [place] on Monday (five days from now) when she returns or to seek emergency care if symptoms worsen while she is out of town. Odalys verbalized understanding of and agreement with plan.  HAILE De Paz, FNP-C  Gynecologic Oncology  [facility]  Pager: [phone]

## 2017-11-16 ASSESSMENT — ENCOUNTER SYMPTOMS
CHILLS: 0
HALLUCINATIONS: 0
FLANK PAIN: 0
DYSURIA: 1
POLYDIPSIA: 0
WEIGHT GAIN: 0
NECK MASS: 0
FATIGUE: 1
HOARSE VOICE: 0
SINUS PAIN: 0
NIGHT SWEATS: 0
BLOATING: 0
CONSTIPATION: 0
SINUS CONGESTION: 0
POLYPHAGIA: 0
WEIGHT LOSS: 0
INCREASED ENERGY: 0
RECTAL PAIN: 0
SORE THROAT: 0
JAUNDICE: 0
VOMITING: 0
FEVER: 0
HEARTBURN: 0
DECREASED APPETITE: 0
DIARRHEA: 1
DIFFICULTY URINATING: 0
ABDOMINAL PAIN: 0
NAUSEA: 0
SMELL DISTURBANCE: 0
TASTE DISTURBANCE: 0
ALTERED TEMPERATURE REGULATION: 0
HEMATURIA: 1
TROUBLE SWALLOWING: 0
BLOOD IN STOOL: 0
BOWEL INCONTINENCE: 0

## 2017-11-17 ENCOUNTER — INFUSION THERAPY VISIT (OUTPATIENT)
Dept: ONCOLOGY | Facility: CLINIC | Age: 57
End: 2017-11-17
Attending: OBSTETRICS & GYNECOLOGY
Payer: COMMERCIAL

## 2017-11-17 ENCOUNTER — ONCOLOGY VISIT (OUTPATIENT)
Dept: ONCOLOGY | Facility: CLINIC | Age: 57
End: 2017-11-17
Attending: NURSE PRACTITIONER
Payer: COMMERCIAL

## 2017-11-17 ENCOUNTER — APPOINTMENT (OUTPATIENT)
Dept: LAB | Facility: CLINIC | Age: 57
End: 2017-11-17
Attending: OBSTETRICS & GYNECOLOGY
Payer: COMMERCIAL

## 2017-11-17 VITALS
BODY MASS INDEX: 23.51 KG/M2 | DIASTOLIC BLOOD PRESSURE: 80 MMHG | WEIGHT: 141.1 LBS | OXYGEN SATURATION: 95 % | SYSTOLIC BLOOD PRESSURE: 124 MMHG | HEIGHT: 65 IN | TEMPERATURE: 98 F | HEART RATE: 95 BPM | RESPIRATION RATE: 16 BRPM

## 2017-11-17 DIAGNOSIS — C56.2 OVARIAN CANCER, LEFT (H): ICD-10-CM

## 2017-11-17 DIAGNOSIS — R53.0 NEOPLASTIC MALIGNANT RELATED FATIGUE: ICD-10-CM

## 2017-11-17 DIAGNOSIS — Z51.11 ENCOUNTER FOR ANTINEOPLASTIC CHEMOTHERAPY: ICD-10-CM

## 2017-11-17 DIAGNOSIS — C56.1 OVARIAN CANCER, RIGHT (H): ICD-10-CM

## 2017-11-17 DIAGNOSIS — Z79.899 ENCOUNTER FOR LONG-TERM (CURRENT) USE OF MEDICATIONS: Primary | ICD-10-CM

## 2017-11-17 DIAGNOSIS — C56.1 OVARIAN CANCER, RIGHT (H): Primary | ICD-10-CM

## 2017-11-17 DIAGNOSIS — D70.2 DRUG-INDUCED NEUTROPENIA (H): ICD-10-CM

## 2017-11-17 LAB
ALBUMIN SERPL-MCNC: 3.5 G/DL (ref 3.4–5)
ALP SERPL-CCNC: 105 U/L (ref 40–150)
ALT SERPL W P-5'-P-CCNC: 24 U/L (ref 0–50)
ANION GAP SERPL CALCULATED.3IONS-SCNC: 11 MMOL/L (ref 3–14)
AST SERPL W P-5'-P-CCNC: 15 U/L (ref 0–45)
BASOPHILS # BLD AUTO: 0 10E9/L (ref 0–0.2)
BASOPHILS NFR BLD AUTO: 0.4 %
BILIRUB SERPL-MCNC: 0.4 MG/DL (ref 0.2–1.3)
BUN SERPL-MCNC: 16 MG/DL (ref 7–30)
CALCIUM SERPL-MCNC: 8.5 MG/DL (ref 8.5–10.1)
CANCER AG125 SERPL-ACNC: 82 U/ML (ref 0–30)
CHLORIDE SERPL-SCNC: 104 MMOL/L (ref 94–109)
CO2 SERPL-SCNC: 23 MMOL/L (ref 20–32)
CREAT SERPL-MCNC: 0.7 MG/DL (ref 0.52–1.04)
DIFFERENTIAL METHOD BLD: ABNORMAL
EOSINOPHIL # BLD AUTO: 0.1 10E9/L (ref 0–0.7)
EOSINOPHIL NFR BLD AUTO: 1.3 %
ERYTHROCYTE [DISTWIDTH] IN BLOOD BY AUTOMATED COUNT: 18.1 % (ref 10–15)
GFR SERPL CREATININE-BSD FRML MDRD: 85 ML/MIN/1.7M2
GLUCOSE SERPL-MCNC: 99 MG/DL (ref 70–99)
HCT VFR BLD AUTO: 32.2 % (ref 35–47)
HGB BLD-MCNC: 11 G/DL (ref 11.7–15.7)
IMM GRANULOCYTES # BLD: 0 10E9/L (ref 0–0.4)
IMM GRANULOCYTES NFR BLD: 0.6 %
LYMPHOCYTES # BLD AUTO: 2 10E9/L (ref 0.8–5.3)
LYMPHOCYTES NFR BLD AUTO: 37.3 %
MAGNESIUM SERPL-MCNC: 1.6 MG/DL (ref 1.6–2.3)
MCH RBC QN AUTO: 36.1 PG (ref 26.5–33)
MCHC RBC AUTO-ENTMCNC: 34.2 G/DL (ref 31.5–36.5)
MCV RBC AUTO: 106 FL (ref 78–100)
MONOCYTES # BLD AUTO: 0.8 10E9/L (ref 0–1.3)
MONOCYTES NFR BLD AUTO: 14.3 %
NEUTROPHILS # BLD AUTO: 2.4 10E9/L (ref 1.6–8.3)
NEUTROPHILS NFR BLD AUTO: 46.1 %
NRBC # BLD AUTO: 0 10*3/UL
NRBC BLD AUTO-RTO: 1 /100
PLATELET # BLD AUTO: 257 10E9/L (ref 150–450)
POTASSIUM SERPL-SCNC: 3.7 MMOL/L (ref 3.4–5.3)
PROT SERPL-MCNC: 7.2 G/DL (ref 6.8–8.8)
RBC # BLD AUTO: 3.05 10E12/L (ref 3.8–5.2)
SODIUM SERPL-SCNC: 138 MMOL/L (ref 133–144)
WBC # BLD AUTO: 5.3 10E9/L (ref 4–11)

## 2017-11-17 PROCEDURE — 25000128 H RX IP 250 OP 636: Mod: ZF | Performed by: NURSE PRACTITIONER

## 2017-11-17 PROCEDURE — 86304 IMMUNOASSAY TUMOR CA 125: CPT | Performed by: NURSE PRACTITIONER

## 2017-11-17 PROCEDURE — 96375 TX/PRO/DX INJ NEW DRUG ADDON: CPT

## 2017-11-17 PROCEDURE — 25000128 H RX IP 250 OP 636: Mod: ZF | Performed by: OBSTETRICS & GYNECOLOGY

## 2017-11-17 PROCEDURE — 99212 OFFICE O/P EST SF 10 MIN: CPT | Mod: ZF

## 2017-11-17 PROCEDURE — 83735 ASSAY OF MAGNESIUM: CPT | Performed by: NURSE PRACTITIONER

## 2017-11-17 PROCEDURE — 85025 COMPLETE CBC W/AUTO DIFF WBC: CPT | Performed by: NURSE PRACTITIONER

## 2017-11-17 PROCEDURE — 99213 OFFICE O/P EST LOW 20 MIN: CPT | Mod: ZP | Performed by: NURSE PRACTITIONER

## 2017-11-17 PROCEDURE — 80053 COMPREHEN METABOLIC PANEL: CPT | Performed by: NURSE PRACTITIONER

## 2017-11-17 PROCEDURE — 96417 CHEMO IV INFUS EACH ADDL SEQ: CPT

## 2017-11-17 PROCEDURE — 96413 CHEMO IV INFUSION 1 HR: CPT

## 2017-11-17 RX ORDER — EPINEPHRINE 0.3 MG/.3ML
0.3 INJECTION SUBCUTANEOUS EVERY 5 MIN PRN
Status: CANCELLED | OUTPATIENT
Start: 2017-11-17

## 2017-11-17 RX ORDER — ALBUTEROL SULFATE 90 UG/1
1-2 AEROSOL, METERED RESPIRATORY (INHALATION)
Status: CANCELLED
Start: 2017-11-17

## 2017-11-17 RX ORDER — ALBUTEROL SULFATE 0.83 MG/ML
2.5 SOLUTION RESPIRATORY (INHALATION)
Status: CANCELLED | OUTPATIENT
Start: 2017-11-17

## 2017-11-17 RX ORDER — HEPARIN SODIUM (PORCINE) LOCK FLUSH IV SOLN 100 UNIT/ML 100 UNIT/ML
500 SOLUTION INTRAVENOUS EVERY 8 HOURS
Status: DISCONTINUED | OUTPATIENT
Start: 2017-11-17 | End: 2017-11-17 | Stop reason: HOSPADM

## 2017-11-17 RX ORDER — DIPHENHYDRAMINE HYDROCHLORIDE 50 MG/ML
50 INJECTION INTRAMUSCULAR; INTRAVENOUS
Status: CANCELLED
Start: 2017-11-17

## 2017-11-17 RX ORDER — ONDANSETRON 2 MG/ML
8 INJECTION INTRAMUSCULAR; INTRAVENOUS ONCE
Status: COMPLETED | OUTPATIENT
Start: 2017-11-17 | End: 2017-11-17

## 2017-11-17 RX ORDER — SODIUM CHLORIDE 9 MG/ML
1000 INJECTION, SOLUTION INTRAVENOUS CONTINUOUS PRN
Status: CANCELLED
Start: 2017-11-17

## 2017-11-17 RX ORDER — LORAZEPAM 2 MG/ML
1 INJECTION INTRAMUSCULAR EVERY 6 HOURS PRN
Status: CANCELLED
Start: 2017-11-17

## 2017-11-17 RX ORDER — MEPERIDINE HYDROCHLORIDE 25 MG/ML
25 INJECTION INTRAMUSCULAR; INTRAVENOUS; SUBCUTANEOUS EVERY 30 MIN PRN
Status: CANCELLED | OUTPATIENT
Start: 2017-11-17

## 2017-11-17 RX ORDER — EPINEPHRINE 1 MG/ML
0.3 INJECTION, SOLUTION, CONCENTRATE INTRAVENOUS EVERY 5 MIN PRN
Status: CANCELLED | OUTPATIENT
Start: 2017-11-17

## 2017-11-17 RX ORDER — HEPARIN SODIUM (PORCINE) LOCK FLUSH IV SOLN 100 UNIT/ML 100 UNIT/ML
5 SOLUTION INTRAVENOUS ONCE
Status: COMPLETED | OUTPATIENT
Start: 2017-11-17 | End: 2017-11-17

## 2017-11-17 RX ORDER — HEPARIN SODIUM (PORCINE) LOCK FLUSH IV SOLN 100 UNIT/ML 100 UNIT/ML
500 SOLUTION INTRAVENOUS EVERY 8 HOURS
Status: CANCELLED
Start: 2017-11-17

## 2017-11-17 RX ORDER — METHYLPREDNISOLONE SODIUM SUCCINATE 125 MG/2ML
125 INJECTION, POWDER, LYOPHILIZED, FOR SOLUTION INTRAMUSCULAR; INTRAVENOUS
Status: CANCELLED
Start: 2017-11-17

## 2017-11-17 RX ADMIN — DEXTROSE MONOHYDRATE 250 ML: 5 INJECTION, SOLUTION INTRAVENOUS at 10:37

## 2017-11-17 RX ADMIN — SODIUM CHLORIDE, PRESERVATIVE FREE 5 ML: 5 INJECTION INTRAVENOUS at 08:52

## 2017-11-17 RX ADMIN — ONDANSETRON 8 MG: 2 INJECTION INTRAMUSCULAR; INTRAVENOUS at 10:45

## 2017-11-17 RX ADMIN — DEXAMETHASONE SODIUM PHOSPHATE: 10 INJECTION, SOLUTION INTRAMUSCULAR; INTRAVENOUS at 10:37

## 2017-11-17 RX ADMIN — SODIUM CHLORIDE, PRESERVATIVE FREE 500 UNITS: 5 INJECTION INTRAVENOUS at 13:45

## 2017-11-17 RX ADMIN — DOXORUBICIN HYDROCHLORIDE 50 MG: 2 INJECTABLE, LIPOSOMAL INTRAVENOUS at 11:10

## 2017-11-17 RX ADMIN — CARBOPLATIN 560 MG: 10 INJECTION, SOLUTION INTRAVENOUS at 12:20

## 2017-11-17 ASSESSMENT — ENCOUNTER SYMPTOMS
ORTHOPNEA: 0
HEADACHES: 0
SPUTUM PRODUCTION: 0
NAIL CHANGES: 0
DEPRESSION: 0
NECK PAIN: 0
BLOOD IN STOOL: 0
INCREASED ENERGY: 0
SORE THROAT: 0
JOINT SWELLING: 0
DISTURBANCES IN COORDINATION: 0
RECTAL BLEEDING: 0
MYALGIAS: 0
MUSCLE WEAKNESS: 0
DOUBLE VISION: 0
INSOMNIA: 1
TROUBLE SWALLOWING: 0
HEARTBURN: 0
WHEEZING: 0
RESPIRATORY PAIN: 0
DYSPNEA ON EXERTION: 0
CHILLS: 0
WEAKNESS: 0
SEIZURES: 0
NECK MASS: 0
ALTERED TEMPERATURE REGULATION: 0
LEG PAIN: 0
JAUNDICE: 0
SNORES LOUDLY: 0
POLYPHAGIA: 0
EYE IRRITATION: 0
COUGH DISTURBING SLEEP: 0
LEG SWELLING: 0
SINUS PAIN: 0
EXTREMITY NUMBNESS: 0
DIARRHEA: 1
PANIC: 0
POLYDIPSIA: 0
FATIGUE: 1
SYNCOPE: 0
FLANK PAIN: 0
LOSS OF CONSCIOUSNESS: 0
CONSTIPATION: 0
HEMOPTYSIS: 0
BACK PAIN: 0
DIZZINESS: 0
SINUS CONGESTION: 0
SWOLLEN GLANDS: 0
STIFFNESS: 0
LIGHT-HEADEDNESS: 0
ARTHRALGIAS: 0
MEMORY LOSS: 0
WEIGHT GAIN: 0
POSTURAL DYSPNEA: 0
DYSURIA: 1
RECTAL PAIN: 0
EXERCISE INTOLERANCE: 0
VOMITING: 0
BRUISES/BLEEDS EASILY: 0
EYE REDNESS: 0
TINGLING: 0
NERVOUS/ANXIOUS: 1
ABDOMINAL PAIN: 0
HYPERTENSION: 0
BOWEL INCONTINENCE: 0
FEVER: 0
DECREASED LIBIDO: 0
SHORTNESS OF BREATH: 0
DIFFICULTY URINATING: 0
COUGH: 0
SKIN CHANGES: 0
BREAST MASS: 0
CLAUDICATION: 0
NAUSEA: 0
EYE WATERING: 0
MUSCLE CRAMPS: 0
BLOATING: 0
BREAST PAIN: 0
SLEEP DISTURBANCES DUE TO BREATHING: 0
PARALYSIS: 0
DECREASED APPETITE: 0
HOARSE VOICE: 0
TASTE DISTURBANCE: 0
SPEECH CHANGE: 0
HALLUCINATIONS: 0
NIGHT SWEATS: 0
HEMATURIA: 1
DECREASED CONCENTRATION: 0
HYPOTENSION: 0
PALPITATIONS: 0
TREMORS: 0
POOR WOUND HEALING: 0
HOT FLASHES: 0
EYE PAIN: 0
TACHYCARDIA: 0
NUMBNESS: 0
WEIGHT LOSS: 0
SMELL DISTURBANCE: 0

## 2017-11-17 ASSESSMENT — PAIN SCALES - GENERAL: PAINLEVEL: NO PAIN (0)

## 2017-11-17 NOTE — PROGRESS NOTES
Infusion Nursing Note:  Odalys Nathan presents today for D1 C3 Doxil, Carboplatin (#14).    Patient seen by provider today: Yes: Patricia Rocha NP    Intravenous Access:  Implanted Port.    Treatment Conditions:  Lab Results   Component Value Date    HGB 11.0 11/17/2017     Lab Results   Component Value Date    WBC 5.3 11/17/2017      Lab Results   Component Value Date    ANEU 2.4 11/17/2017     Lab Results   Component Value Date     11/17/2017      Lab Results   Component Value Date     11/17/2017                   Lab Results   Component Value Date    POTASSIUM 3.7 11/17/2017           Lab Results   Component Value Date    MAG 1.6 11/17/2017            Lab Results   Component Value Date    CR 0.70 11/17/2017                   Lab Results   Component Value Date    JOSE ALBERTO 8.5 11/17/2017                Lab Results   Component Value Date    BILITOTAL 0.4 11/17/2017           Lab Results   Component Value Date    ALBUMIN 3.5 11/17/2017                    Lab Results   Component Value Date    ALT 24 11/17/2017           Lab Results   Component Value Date    AST 15 11/17/2017     Results reviewed, labs MET treatment parameters, ok to proceed with treatment.    Note: N/A.    Post Infusion Assessment:  Patient tolerated infusion without incident.  Patient observed for 30 minutes post Carboplatin per protocol.  Blood return noted pre and post infusion.  Site patent and intact, free from redness, edema or discomfort.  No evidence of extravasations.  Access discontinued per protocol.    Discharge Plan:   Patient declined prescription refills.  Discharge instructions reviewed with: Patient and Family.  Patient and/or family verbalized understanding of discharge instructions and all questions answered.  AVS to patient via [employer].  Patient will return 12/12/17 for CT/labs and 12/14/17 for provider/infusion next appointment.   Patient discharged in stable condition accompanied by: self and .  Departure  Mode: Ambulatory.    Yesenia Perez RN

## 2017-11-17 NOTE — PROGRESS NOTES
"Gynecologic Oncology Follow-Up Note  RE: Odalys Nathan  MRN: [id number]  : 1960  Date of Visit: 2017    CC: Odalys Nathan is a 57 year old year old female with recurrent stage IIIC bilateral ovarian cancer who presents today for disease management with Doxil.    HPI: Odalys comes to the clinic accompanied by her  Marcos. She is feeling well today, however, notes that her fatigue continues to worsen. She is able to carry out ADLs and was able to travel to [place] recently but states that chemotherapy is taking a toll on her. Having more diarrhea than normal- takes Lomotil as needed. Felt like she \"burned my insides\" when she ate a hot dinner of chicken and mashed potatoes- feeling improved now. Denies any other skin/mucosal issues with Doxil. Had been having symptoms of a UTI and a UA was positive for hematuria, however, symptoms resolved with drinking increased amounts of water. She continues on ferrous sulfate 325mg TID for history of anemia. She also continues tinnitus- denies need for intervention. Has had many cycles of carboplatin, no history of reaction thus far. She is eating and drinking well, reports she is ready for chemotherapy today.      Brief Oncology History:  2012 - Admitted to hospital for 2 weeks of intermittent abdominal cramping, distention, diarrhea and N/V. CT of abdomen/pelvis significant for small bowel obstruction, a heterogenous soft tissue density in the pelvis, omental nodules, and ascites. Bilateral adnexal masses per U/S of pelvis. CA-125 was elevated at 987, CEA was normal at 1.0.    12 - Therapeutic paracentesis (4 L) with cytology confirming malignancy (TX positive, weak ER positive, CK-7 positive consistent with GYN primary). Surgery recommended d/t potential for falling blood counts 2/2 chemotherapy (patient is Sabianism and limiting blood transfusion)    12 - Exploratory laparotomy, MAR BSO, lysis of adhesion, Appendectomy, " Repair cystotomy, Omentectomy Jjhy-qlaicp-ivazrrrga-transverse-colon resection, Ileo-descending colon anastomosis, CUSA, & Colonoscopy (done by Dr. Amato and colorectal team).  2/20/2012 - Admitted to hospital for bilateral pulmonary emboli and drainage of pleural effusion. Started on Lovenox.  2/28/12-3/21/12: Cycle #1-2 Carbo/Taxol IV  3/29/12 - Started on Keflex by her PCP for infection in her healing wound (immediately below her umbilicus).    4/11/12-6/14/12: Cycle #3-6 IV chemo, Cycle #1 IV/IP chemo  7/16/12 -  8, CT PRADEEP - enrolled in  - observation arm  3/4/13-6/28/13:  6, 9, 12, 15, 20.  7/1/13: CT Chest, Abdomen, Pelvis IMPRESSION:  1. Worsening metastatic ovarian carcinoma suggested by increased size of soft tissue nodules anterior to the right psoas muscle, that may represent growing mesenteric lymphadenopathy.  2. Remaining prominent right lower quadrant mesenteric lymph nodes are not significantly changed from CT 7/16/2012.  3. Clustered nodular opacities in the right lower lobe are not significant change from 7/16/2012 and remain indeterminate. Again, the appearance and distribution is suggestive of an infectious etiology.  4. Stable 8 mm soft tissue nodule in left breast, unchanged since at least 02/20/2012. This can be observed on followup studies, but correlation with mammography could be considered.  Decision made to start Doxil/Carbo.  7/11/13: The left ventricular ejection fraction is normal at 66.4%.  7/12/13-9/6/13: Cycle #1-3 Doxil/Carbo.  10, 11.    9/30/13 CT C/A/P Impression:  1. Overall, favorable response to treatment with decreasing size of soft tissue nodules tracking along the anterior aspect of the right psoas muscle.  2. 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  3. Improved cluster of right lower lobe pulmonary nodular opacities. These may represent resolving infection.  10/3/13-12/9/13:  9, 8, 10. Cycle 4-6 Doxil/Carbo.    1/13/14 CT C/A/P  Impression:   1. Stable appearance of metastatic ovarian cancer. Scattered soft tissue nodules along the anterior aspect of the right psoas muscle are unchanged in size. Mild mesenteric lymphadenopathy is unchanged.  2. Clustered micronodules in the right lower lobe are unchanged from 9/30/2013, but improved from 7/1/2013. This history suggests a postinflammatory/postinfectious etiology.  3. 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  1/16/14 Discussed multiple options for her based on relatively stable-appearing disease on CT but slight increase in  (which has had small increase to 20 with last recurrence) including chemo break with recheck of  in 1 month, starting new chemo agent immediately, and exploratory surgery with possible resection of nodules. She is considered platinum-sensitive based on > 1 year remission after Taxol/Carbo, which we will take into consideration for future chemo planning. I am not inclined to surgery at this time given difficulty she already has with diarrhea secondary to past colon resection. I suspect we would need to resect further bowel due to mesenteric disease. Also explained inherent risks of any major surgery. Also mentioned maintenance chemo, but this has not been shown to increase overall survival and would likely decrease her quality of life without significant benefit. Family is going on vacation to [place] in 2 weeks and Odalys does not want to have chemo prior to that, so will plan to take 1 month break. She can have  at that time (discussed checking toady since last draw was in early December, but as it would likely not change treatment plan and she has h/o slow rising , will not check today).  2/17/14  16    2/20/14: Decision to take break from chemo for two months, followed by CT and CA-125.    4/21/14  27  4/21/14 CT C/A/P Impression:    1. Increased size of 2 low-attenuation lymph nodes anterior to the right psoas muscle is  "concerning for worsening metastatic ovarian cancer.    2. New circumferential thickening of a 3.8 cm length segment of distal transverse colon is likely physiologic. Recommend attention on followup imaging.    3. Grossly unchanged size of clustered small nodules versus scarring in the right lower lobe the lungs.    4. Stable thrombosis of the right ovarian vein.  5/14/14: Diagnostic laparoscopy converted to exploratory laparotomy and removal of mesenteric masses, tumor debulking, peritoneal biopsies and intraperitoneal port placement. On laparoscopy, it was noted that there were small nodules on the anterior abdominal wall near the previous incision, small nodules on the right pelvic sidewall as well. Nodules were palpated in the mesentery; however, as it was unable to clarify where the origin of the nodules was, the decision was made to open the patient. On opening there was found to be approximately a 3 cm nodule in the small bowel mesentery and another separate approximately 2 cm nodule in the bowel mesentery. Pelvis without evidence of cancer, some mesenteric lymph nodes were palpated. No evidence otherwise of any disseminated cancer throughout the abdomen.    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 (2/2)  -Largest metastasis: 1.3 cm  -See comment  D: Peritoneum, right paracolic gutter #2, biopsy:  -Fibroadipose tissue with granulomatous inflammation surrounding refractile material  -Negative for malignancy   E: Small bowel adhesion, biopsy:  -Fibroconnective tissue, consistent with adhesion  -Negative for malignancy  F: Lymph nodes, mesentry, not otherwise " "specified, excision:  -Two lymph nodes, negative for metastatic carcinoma (0/2)  G: Lymph node, mesentery, \"#2\", excision:  -One lymph node, negative for metastatic carcinoma (0/1)  H: Lymph nodes, mesentery, \"nodule #2\", excision:  -Five lymph nodes, negative for metastatic carcinoma (0/5)  COMMENT:  Some of the specimens show post-operative changes. Others show possible treatment related changes, including necrosis. The metastatic carcinoma in the mesenteric lymph nodes (specimen C) shows variable morphology, including relatively low grade tumor with papillary architecture, and high grade tumor comprised of nests of tumor cells with irregular, slit-like spaces and marked nuclear pleomorphism.    5/29/14: Cycle 1 IV PACLitaxel / IP CISplatin / IP PACLitaxel.  - 28.  6/26/14: Cycle #2 IV/IP.  10  8/5/14: CT chest/abd/pelvis IMPRESSION     1. In this patient with ovarian cancer, overall findings are indicative of stable/slight improvement, as multiple mesenteric lymphadenopathy and scattered nodular peritoneal soft tissue mass lesions appear unchanged or slightly smaller since 4/21/2014.    2. 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 Mild dilatation of the second and the third portion of the duodenum with a narrow SMA angle. This could represent SMA syndrome, if clinically correlated.17/14:    Cycle #3 IV/IP.  10.    CT chest/abd/pelvis with contrast on 8/5/14    Impression:    1. In this patient with ovarian cancer, overall findings are indicative of stable/slight improvement, as multiple mesenteric lymphadenopathy and scattered nodular peritoneal soft tissue mass lesions appear unchanged or slightly smaller since 4/21/2014.    2. 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 Mild dilatation of the second and the third portion of the duodenum with a narrow SMA angle. This could represent SMA syndrome, if clinically correlated.  8/7/14: Cycle #4 Taxol/Carbo (changed " from IV/IP).  10. She has been feeling okay. She is unsure if she can finish out the course of 6 cycles IV/IP taxol/cisplatin. She feels like she has the flu for about a week then starts feeling gradually better after each chemo cycle. Her spouse notes that she actually has been more sick with the treatments than she initially admits here. She was also previously having some rib pain. Denies any rib pain now. Denies any chest pain or shortness of breath.  Plan: discussed recent CT cap results and switching to just IV as she is feeling miserable with IP treatments. Switch to IV carbo/taxol as patient is platinum sensitive.  We also discussed her taking part of the tesaro trial, which would require BRCA testing. She would like to take part in this trial if eligible.  8/20/14: Remove Intraperitoneal Port ( Port and catheter intact - discarded)  8/28/14: Cycle #5 Taxol/Carbo held due to thrombocytopenia.  6.    She denies any vaginal bleeding, no changes in her bowel or bladder habits, no nausea/emesis, no lower extremity edema, and no difficulties eating or sleeping. She denies any abdominal discomfort/bloating, no fevers or chills, and no chest pain or shortness of breath. She states her diarrhea is the same. She reports some fatigue which improves about 1-2 weeks after her chemotherapy. She states she does not need any medication refills and she was told she does not meet the criteria for the TESARO trial. She states she has 3 bags of iv fluids left over from her previous chemotherapy and will give these to herself. She states she is ready for her treatment today.    9/29/14: Cycle #6 Taxol/Carbo  6. Insurance questions regarding GSF coverage today. No concerns other than fatigue. Taking iron for anemia and does not desire blood transfusion. Using neulasta for neutropenia. Using home IV hydration if needed. Baseline unchanged. No abdominal bloating, constipation, diarrhea, pain, vaginal or rectal  bleeding, cough or dyspnea, fluid retention.    10/16/14: Impression:    1. Nodular peritoneal soft tissue mass in the right lower quadrant adjacent to the psoas muscle is no longer appreciated. Adjacent prominent lymphadenopathy is unchanged from previous exam. No new peritoneal lesions.    2. 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    10/20/14:  5. CT chest/abdomen/pelvis on 10/16/14 showed nodular peritoneal soft tissue mass in the right lower quadrant adjacent to the psoas muscle is no longer appreciated. Adjacent prominent lymphadenopathy is unchanged from previous exam. 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  1/27/15:  6.  4/28/15:  14.  5/26/15:  18.  6/2/15: CT cap Impression:  1. Postsurgical changes of hysterectomy and bilateral salpingo-oophorectomy for ovarian cancer. There is a new 8 mm hazy, ill-defined hypoattenuating lesion in hepatic segment 6 which is suspicious for a metastatic deposit. Further evaluation with ultrasound in recommended.    2. Increased size of a left retroperitoneal lymph node which is indeterminate but may represent a ciro metastasis. Mildly prominent lymph nodes in the right lower quadrant are not significantly changed.  3. Moderate colonic stool burden.    6/4/15: US abdomen IMPRESSION:    Hyperechoic lesion in the right hepatic lobe, consistent with hemangioma. This does not corresponds to the area of the lesion seen on CT from 6/2/2015. An MR would be helpful for identifying and characterizing the lesion from the recent CT.  6/12/15: MR abd IMPRESSION:  1. New 20 x 11 mm enhancing lesions between the right obliques, concerning for metastatic disease. This lesions should be amenable to percutaneous biopsy, if indicated.  2. Correlating to the lesion visualized on comparison CT is a hepatic segment 6 subcapsular 7 mm lesion. Overall the appearance favors the diagnosis of a simple cyst.  However, there is faint suggestion of mild peripheral arterial enhancement. Although this is favored as  artifactual, this should be followed up to confirm stability. Recommend 6 month followup.  3. Hepatic segment 6, 5 mm lesion too small to technically characterize. Differential would favor FNH, less likely flash filling hemangioma. Recommend attention on followup.  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     9/1/15: Cycle #1 Avastin/Cytoxan.   31.     9/24/15:feeling generally well. She says she has been having back and stomach spasms. She is taking cytosine daily (she ran out yesterday). She also says its affecting her voice. Admits that it burns occasionally. She is eating and drinking normal. She also admit diarrhea, 5-7 times daily, lose/watery. She trying to stay hydrated and eat fiber. She also says that her body is sore, especially the bottom of her feet. Her blood pressure is normal. She also admits having headache after her first infusion.      9/24/15: Cycle #2 Avastin/Cytoxan.  19.  10/15/15: Cycle #3 Avastin/Cytoxan.  16.     11/6/15: CT c/a/p IMPRESSION:    1. Stable postoperative change of MAR/BSO for ovarian cancer.  2. The lesion in the right flank abdominal musculature is slightly decreased in size. Otherwise, stable examination.  2. No evidence of metastatic disease in the chest.    11/20/15: Treatment planning visit,  16    11/25/15: surgical pathology report  FINAL DIAGNOSIS:  Soft tissue, right oblique muscle mass, excision:  -Recurrent ovarian serous carcinoma  -Carcinoma is present less than 1 mm from one resection margin  -Background skeletal muscle and fibroadipose tissue    1/4/16:  22  1/11/16-1/27/16: Radiation to right flank x 12 treatments  4/7/2016:  94. CT cap IMPRESSION:    In this patient with ovarian cancer  status post MAR/BSO and descending/transverse colectomy:  1. No evidence for malignancy in the chest, abdomen, or pelvis.  2. Stable small hypodense segment 6 liver lesion, appears more likely benign, possibly a cyst.  5/13/16:  124.  6/3/16: PET CT IMPRESSION: In this patient with a history of ovarian cancer:  1. Hypermetabolic and enlarging periaortic and perihepatic lymphadenopathy compatible with metastatic disease, as detailed above.  2. Although hypodense lesion in hepatic segment 6 has been present since 6/2/2015 associated hypermetabolism makes this lesion highly concerning for metastatic disease.    Plan: to start Niraparib under TESARO study.     6/9/16:  137.  6/14/16: Cycle #1 Niraparib.    6/28/16: Cycle #1 D15 Niraparib.    7/5/16:  100.    7/11/16: Cycle #2 Nraparib.   83.    8/3/2016: PET CT IMPRESSION:    In this patient with known history of ovarian cancer:  1) New pleural based nodular opacities in the lateral and inferior aspects of the bilateral lower lobes, worse in the left lung. Likely infection. Close follow up is recommended.      2) Slight decrease in hypermetabolic abdominal lymphadenopathy. 2 hypermetabolic lymph nodes persist.  3) Unchanged right hepatic lobe metastatic lesion.     8/9/16: Cycle #3 Niraparib.  69.  9/6/16: Cycle #4 Niraparib.  53. Dose held due to anemia.  9/13/16: Eval for potential cycle 4 niraparib. Dose held due to anemia.  10/4/16: CT CAP impression:  IMPRESSION: In this patient with a known history of ovarian cancer:  1. There has been interval resolution of pleural-based nodular  opacities which likely represented infection.  2. Abdominal lymphadenopathy in the form of 2 portacaval lymph nodes  have not significantly changed in size, noted to be hypermetabolic on  prior PET/CT.  3. Previously demonstrated metastatic lesion in the right lobe of the  liver is not significantly changed.  10/11/16:  60  11/1/16: C6 niraparib,   75  11/29/16: C7 niraparib.  78. CT CAP impression as follows:  Target lesions (RECIST criteria):       A previously described target lesion superior to the head of the  pancreas (series 2, image 64)  (referred to as a perihepatic node on  6/3/2016) may not be a valid target lesion because it measured less  than 1.5 cm originally. However, this particular node has decreased in  size, now measuring 7 mm in short axis versus 14 mm on 6/3/2016 when  measured in a similar fashion.       2.3 cm short axis portacaval lymph node on series 2 image 67,  previously 2.0 cm on 10/4/2016.       1.2 cm subtle hypodensity in hepatic segment 6 on series 2 image 75,  stable on multiple studies since at least 6/3/2016     Sum of diameters today: 3.5 cm. Sum of diameters 10/4/2016: 3.2 cm.  Growth = 9%.    12/27/16: C8 niraparib.  105.  1/25/17: C9 niraparib.  108.  2/23/17: C10 niraparib.  132.   CT CAP impression:  Sum of target lesion diameters today: 3.7 cm. Sum of target lesion  diameters on 11/28/2016: 3.5 cm. Growth= 6%  1. In this patient with history of ovarian cancer there is stable  disease by RECIST criteria as evidenced by:   1a. Mildly increased size of liver metastasis.  1b. Stable portacaval lymphadenopathy.  1c. No evidence of metastatic disease in the chest.  2. Trace emphysematous changes of the lungs.  3/22/17: C11 niraparib.  132.  4/19/17: C12 niraparib.  127.  5/16/17: CT CAP IMPRESSION: In this patient with ovarian cancer:  1. Mildly increased size of hepatic metastasis segment 6 with subtle increased capsular retraction.  2. Stable edmundo hepatis nodes, with mild increase in size of periaortic lymph nodes.  3. Prominent left supraclavicular lymph node with subtle increase in size compared to prior studies, particularly comparing to 10/4/2016.  4. Mild subtle groundglass opacities in the right upper lobe, not present on prior study, lesser extent in the right lower  lobe.  Findings may represent infection, additional consideration is malignancy (less likely), and attention on follow-up study  Recommended.  Addendum:   Prominent left supraclavicular lymph node (3/25) is stable from most recent CT performed 2/20/2017, currently measuring 14 x 16 mm, previously 14 x 16 mm on 2/20/2017.      The portal caval lymph node/edmundo hepatis lymph node is stable from 2/20/2017, measuring 21 mm.      Clarification of size of the para-aortic lymph node (series 3 image 327). It short axis measurement is 11 mm versus 9 mm on prior study.      Hepatic segment 6 triangular-shaped low density lesion (3/362) measures 14 mm, previously measured 12 mm, demonstrating a  possible/questionable minimal subtle increase in size. Similarly the lymph nodes noted above in the supraclavicular and para-aortic regions demonstrate possible minimal possible subtle increase in size.      5/18/17: Cycle #13 Niraparib.  191.  6/15/17: Cycle #14 Niraparib.  146.  7/13/17: Cycle #15 Niraparib 200 mg.  171     CT (8/9/17):       IMPRESSION:  1. Segment 6 hepatic metastasis is stable to minimally increased in  size.  2. Slight increase in multicentric adenopathy, most pronounced at the  edmundo hepatis. Additional sites include the inferior left  neck/supraclavicular region and retroperitoneum which appear stable to  minimally increased.      8/10/17:  175  9/14/17: MUGA LVEF 54%  9/22/17: C1D1 carboplatin/Doxil.  187.  10/19/17: C2D1 carboplatin/Doxil.  108.  11/17/17: C3D1 carboplatin/Doxil.  82.    Past Medical History:   Diagnosis Date     Antiplatelet or antithrombotic long-term use      Ascites      Blood clot in the legs      Diabetes (H)      Ovarian cancer (H)     serous,stg IV     Pleural effusion      Pulmonary embolism (H) 2/2012     Refusal of blood transfusions as patient is Christianity      Short gut syndrome      Subclinical hypothyroidism 4/18/2013      Thrombosis of leg        Past Surgical History:   Procedure Laterality Date     COLECTOMY       COLONOSCOPY  2/1/2012    Procedure:COLONOSCOPY; With Biopsy; Surgeon:TONI SULLIVAN; Location:[facility]     HYSTERECTOMY TOTAL ABD, RHIANNA SALPINGO-OOPHORECTOMY, NODE DISSECTION, TUMOR DEBULKING, COMBINED  2/1/2012    Procedure:COMBINED HYSTERECTOMY TOTAL ABDOMINAL, BILATERAL SALPINGO-OOPHORECTOMY, NODE DISSECTION, TUMOR DEBULKING;  Exploratory Laparotomy, Total Abdominal Hysterectomy, Bilateral Salpingo-Oophorectomy, appendectomy,lysis of adhesions, ileal, ascending, transverse and splenic flexure resection, ileal descending bowel renanastomosis, incidental cystotomy repair, CUSA procedure and colonoscopy ; [facility]     INSERT PORT PERITONEAL ACCESS  4/3/2012    Procedure:INSERT PORT PERITONEAL ACCESS; Intraperitoneal Port Placement (c-arm); Surgeon:SAMUEL CARRASCO; Location:[facility]     INSERT PORT PERITONEAL ACCESS  5/14/2014    Procedure: INSERT PORT PERITONEAL ACCESS;  Surgeon: Nga Yeung MD;  Location: [facility] OR     INSERT PORT VASCULAR ACCESS       LAPAROSCOPY DIAGNOSTIC (GYN)  5/14/2014    Procedure: LAPAROSCOPY DIAGNOSTIC (GYN);  Surgeon: Nga Yeung MD;  Location: [facility] OR     LAPAROTOMY EXPLORATORY Right 11/25/2015    Procedure: LAPAROTOMY EXPLORATORY;  Surgeon: Nga Yeung MD;  Location: [facility]     LAPAROTOMY, TUMOR DEBULKING, COMBINED  5/14/2014    Procedure: COMBINED LAPAROTOMY, TUMOR DEBULKING;  Surgeon: Nga Yeung MD;  Location: [facility]     REMOVE CATHETER PERITONEAL N/A 8/20/2014    Procedure: REMOVE CATHETER PERITONEAL;  Surgeon: Nga Yeung MD;  Location: [facility] OR     VASCULAR SURGERY      stent left iliac vein       Current Outpatient Prescriptions   Medication     sulfamethoxazole-trimethoprim (BACTRIM DS/SEPTRA DS) 800-160 MG per tablet     Ferrous Sulfate 324 (65 FE) MG TBEC     LORazepam (ATIVAN) 1 MG tablet     prochlorperazine (COMPAZINE) 10 MG tablet     LEVOTHYROXINE  SODIUM PO     order for DME     METFORMIN HCL PO     diphenoxylate-atropine (LOMOTIL) 2.5-0.025 MG per tablet     cyanocobalamin (VITAMIN B12) 1000 MCG/ML injection     magnesium oxide (MAG-OX) 400 MG tablet     ASPIRIN PO     potassium chloride (KLOR-CON) 20 MEQ packet     VITAMIN E NATURAL PO     Cholecalciferol (VITAMIN D PO)     HERBALS     Ascorbic Acid (VITAMIN C PO)     Calcium Carbonate-Vitamin D (CALCIUM + D PO)     No current facility-administered medications for this visit.      Facility-Administered Medications Ordered in Other Visits   Medication     heparin 100 UNIT/ML injection 500 Units          Allergies   Allergen Reactions     Nkda [No Known Drug Allergies]        Family History   Problem Relation Age of Onset     CANCER Mother 69     lung, smoker     CANCER Maternal Uncle 65     brain     Colon Cancer Maternal Aunt 80     colon       Social History     Social History     Marital status:      Spouse name: N/A     Number of children: N/A     Years of education: N/A     Occupational History     Not on file.     Social History Main Topics     Smoking status: Former Smoker     Years: 8.00     Types: Cigarettes     Quit date: 6/21/1980     Smokeless tobacco: Never Used      Comment: started at 11 yo and quit at 18 yo     Alcohol use Yes      Comment: 3x/day wine or jodi     Drug use: No     Sexual activity: Not on file     Other Topics Concern     Not on file     Social History Narrative       Review of Systems     Constitutional:  Positive for fatigue. Negative for fever, chills, weight loss, weight gain, decreased appetite, night sweats, recent stressors, height gain, height loss, post-operative complications, incisional pain, hallucinations, increased energy, hyperactivity and confused.   HENT:  Positive for tinnitus. Negative for ear pain, hearing loss, nosebleeds, trouble swallowing, hoarse voice, mouth sores, sore throat, ear discharge, tooth pain, gum tenderness, taste disturbance,  smell disturbance, hearing aid, bleeding gums, dry mouth, sinus pain, sinus congestion and neck mass.    Eyes:  Negative for double vision, pain, redness, eye pain, decreased vision, eye watering, eye bulging, eye dryness, flashing lights, spots, floaters, strabismus, tunnel vision, jaundice and eye irritation.   Respiratory:   Negative for cough, hemoptysis, sputum production, shortness of breath, wheezing, sleep disturbances due to breathing, snores loudly, respiratory pain, dyspnea on exertion, cough disturbing sleep and postural dyspnea.    Cardiovascular:  Negative for chest pain, dyspnea on exertion, palpitations, orthopnea, claudication, leg swelling, fingers/toes turn blue, hypertension, hypotension, syncope, history of heart murmur, chest pain on exertion, chest pain at rest, pacemaker, few scattered varicosities, leg pain, sleep disturbances due to breathing, tachycardia, light-headedness, exercise intolerance and edema.   Gastrointestinal:  Positive for diarrhea. Negative for heartburn, nausea, vomiting, abdominal pain, constipation, blood in stool, melena, rectal pain, bloating, hemorrhoids, bowel incontinence, jaundice, rectal bleeding, coffee ground emesis and change in stool.   Genitourinary:  Positive for dysuria and hematuria. Negative for bladder incontinence, urgency, flank pain, vaginal discharge, difficulty urinating, genital sores, dyspareunia, decreased libido, nocturia, voiding less frequently, arousal difficulty, abnormal vaginal bleeding, excessive menstruation, menstrual changes, hot flashes, vaginal dryness and postmenopausal bleeding.   Musculoskeletal:  Negative for myalgias, back pain, joint swelling, arthralgias, stiffness, muscle cramps, neck pain, bone pain, muscle weakness and fracture.   Skin:  Negative for nail changes, itching, poor wound healing, rash, hair changes, skin changes, acne, warts, poor wound healing, scarring, flaky skin, Raynaud's phenomenon, sensitivity to  sunlight and skin thickening.   Neurological:  Negative for dizziness, tingling, tremors, speech change, seizures, loss of consciousness, weakness, light-headedness, numbness, headaches, disturbances in coordination, extremity numbness, memory loss, difficulty walking and paralysis.   Endo/Heme:  Negative for anemia, swollen glands and bruises/bleeds easily.   Psychiatric/Behavioral:  Negative for depression, hallucinations, memory loss, decreased concentration, mood swings and panic attacks.    Breast:  Negative for breast discharge, breast mass, breast pain and nipple retraction.   Endocrine:  Negative for altered temperature regulation, polyphagia, polydipsia, unwanted hair growth and change in facial hair.        Physical Exam:    /80 (BP Location: Right arm, Patient Position: Chair, Cuff Size: Adult Regular)  Pulse 95  Temp 98  F (36.7  C) (Oral)  Wt 64 kg (141 lb 1.6 oz)  SpO2 95%  BMI 23.48 kg/m2    General: Alert non-toxic appearing female in no acute distress  HEENT: Normocephalic, atraumatic; PERRLA; no scleral icterus; oropharynx pink without lesions; neck supple; small enlarged left supraclavicular lymph node roughly 1cm in size  Pulmonary: Lungs clear to auscultation, no increased work of breathing noted  Cardiac: Regular rate and rhythm, S1S2, no clicks, murmurs, rubs, or gallops; bilateral lower extremities without edema  GI: Normoactive bowel sounds x4 quadrants, abdomen soft, non-distended, and non-tender to palpation without masses or organomegaly  : Not indicated  Heme: Cervical, supraclavicular, and inguinal nodes without lymphadenopathy  MSK: Moves all extremities, no obvious muscle wasting  Neuro: No gross deficits, normal gait  Skin: Appropriate color for race, warm and dry, no rashes or lesions to unclothed skin; no palmar plantar erythrodysesthesia  Psych: Pleasant and interactive, affect bright, makes appropriate eye contact, thought process linear    Labs:      11/17/2017  Day  1   Hemoglobin 11.7 - 15.7 g/dL 11.0 (A)   Hematocrit 35.0 - 47.0 % 32.2 (A)   Platelet Count 150 - 450 10e9/L 257   Absolute Neutrophil 1.6 - 8.3 10e9/L 2.4   Sodium 133 - 144 mmol/L 138   Potassium 3.4 - 5.3 mmol/L 3.7   Chloride 94 - 109 mmol/L 104   Carbon Dioxide 20 - 32 mmol/L 23   Urea Nitrogen 7 - 30 mg/dL 16   Creatinine 0.52 - 1.04 mg/dL 0.70   Calcium 8.5 - 10.1 mg/dL 8.5   Magnesium 1.6 - 2.3 mg/dL 1.6   Bilirubin Total 0.2 - 1.3 mg/dL 0.4   ALT 0 - 50 U/L 24   AST 0 - 45 U/L 15   Alkaline Phosphatase 40 - 150 U/L 105   Albumin 3.4 - 5.0 g/dL 3.5   Protein Total 6.8 - 8.8 g/dL 7.2   WBC 4.0 - 11.0 10e9/L 5.3      82      Assessment/Plan:  1) Recurrent stage IIIC bilateral ovarian cancer: Currently feeling well other than fatigue. Tolerating chemotherapy without dose limiting side effects, no apparent palmar plantar erythrodysesthesia. Proceed with C3D1 carboplatin/Doxil as ordered by Dr. Yeung. To have CT imaging and treatment planning in four weeks with Dr. Yeung. States she would prefer to restart a PARP inhibitor if possible- states she tolerated this better, but would proceed on carboplatin/Doxil if this is what Dr. Antoine recommends. Reviewed risks of carboplatin reaction and s/sxs reaction and when to call her nurse/seek further care. Reviewed signs and symptoms for when she should contact the clinic or seek additional care, including but not limited to fever, chills, inability to keep down food or fluids, nausea and vomiting not controlled with antiemetics, and diarrhea leading to dehydration. Patient to contact the clinic with any questions or concerns in the interim.   2) Genetic counseling: Testing has been performed and she is negative for mutations in BRCA1, BRCA2, EPCAM, MLH1, MSH2, MSH6, PMS2, PTEN, and TP53 genes  3) Health maintenance issues discussed include to continue following up with PCP for non-gynecologic concerns.  4) Patient verbalized understanding of and agreement with  plan    A total of 20 minutes spent with patient, over 50% spent in counseling and coordination of care.    HAILE De Paz, FNP-C  Family Nurse Practitioner  Gynecologic Oncology  [facility]  Pager: [phone]

## 2017-11-17 NOTE — MR AVS SNAPSHOT
After Visit Summary   11/17/2017    Odalys Nathan    MRN: [id number]           Patient Information     Date Of Birth          1960        Visit Information        Provider Department      11/17/2017 10:00 AM  17 ATC;  ONCOLOGY INFUSION [facility]        Today's Diagnoses     Encounter for long-term (current) use of medications    -  1    Ovarian cancer, right (H)        Ovarian cancer, left (H)        Drug-induced neutropenia (H)          Care Instructions    Contact Numbers  [facility] Nurse Triage: [phone]  After Hours Nurse Line:  [phone]    Please call the [facility] Triage line if you experience a temperature greater than or equal to 100.5, shaking chills, have uncontrolled nausea, vomiting and/or diarrhea, dizziness, shortness of breath, chest pain, bleeding, unexplained bruising, or if you have any other new/concerning symptoms, questions or concerns.     If it is after hours, weekends, or holidays, please call either the after hours nurse line listed above.    If you are having any concerning symptoms or wish to speak to a provider before your next infusion visit, please call your care coordinator or triage to notify them so we can adequately serve you.     If you need a refill on a narcotic prescription or other medication, please call triage before your infusion appointment.         November 2017 Sunday Monday Tuesday Wednesday Thursday Friday Saturday                  1     2     3     4       5     6     7     8     LAB    3:00 PM   (15 min.)   CR LAB   [facility] 9     10     11       12     13     14     15     16     17     [facility]    8:30 AM   (15 min.)   [facility]   [facility] Lab Draw     [facility] RETURN ACTIVE TREATMENT    8:45 AM   (40 min.)   Patricia Rocha APRN CNP   [facility] ONC INFUSION 240   10:00 AM   (240 min.)    ONCOLOGY INFUSION   [facility] 18       19     20     21     22     23     24     25       26     27     28     29     30 December 2017 Sunday Monday Tuesday Wednesday Thursday Friday Saturday                            1     2       3     4     5     6     7     8     9       10     11     12     [facility] LAB DRAW    8:30 AM   (15 min.)    [facility] LAB DRAW   [facility] Lab Draw     CT CHEST/ABDOMEN/PELVIS W    8:45 AM   (20 min.)   UCCT2   [facility] 13     14     P RETURN ACTIVE TREATMENT   10:25 AM   (20 min.)   Nga Yeung MD   [facility] ONC INFUSION 240   11:30 AM   (240 min.)    ONCOLOGY INFUSION   [facility] 15     16       17     18     19     20     21     22     23       24     25     26     27     28     29     30       31                                                Lab Results:  Recent Results (from the past 12 hour(s))   CBC with platelets differential    Collection Time: 11/17/17  9:02 AM   Result Value Ref Range    WBC 5.3 4.0 - 11.0 10e9/L    RBC Count 3.05 (L) 3.8 - 5.2 10e12/L    Hemoglobin 11.0 (L) 11.7 - 15.7 g/dL    Hematocrit 32.2 (L) 35.0 - 47.0 %     (H) 78 - 100 fl    MCH 36.1 (H) 26.5 - 33.0 pg    MCHC 34.2 31.5 - 36.5 g/dL    RDW 18.1 (H) 10.0 - 15.0 %    Platelet Count 257 150 - 450 10e9/L    Diff Method Automated Method     % Neutrophils 46.1 %    % Lymphocytes 37.3 %    % Monocytes 14.3 %    % Eosinophils 1.3 %    % Basophils 0.4 %    % Immature Granulocytes 0.6 %    Nucleated RBCs 1 (H) 0 /100    Absolute Neutrophil 2.4 1.6 - 8.3 10e9/L    Absolute Lymphocytes 2.0 0.8 - 5.3 10e9/L    Absolute Monocytes 0.8 0.0 - 1.3 10e9/L    Absolute Eosinophils 0.1 0.0 - 0.7 10e9/L    Absolute Basophils 0.0 0.0 - 0.2 10e9/L    Abs Immature Granulocytes 0.0 0 - 0.4 10e9/L    Absolute Nucleated RBC 0.0    Comprehensive metabolic panel    Collection Time: 11/17/17  9:02 AM   Result Value  Ref Range    Sodium 138 133 - 144 mmol/L    Potassium 3.7 3.4 - 5.3 mmol/L    Chloride 104 94 - 109 mmol/L    Carbon Dioxide 23 20 - 32 mmol/L    Anion Gap 11 3 - 14 mmol/L    Glucose 99 70 - 99 mg/dL    Urea Nitrogen 16 7 - 30 mg/dL    Creatinine 0.70 0.52 - 1.04 mg/dL    GFR Estimate 85 >60 mL/min/1.7m2    GFR Estimate If Black >90 >60 mL/min/1.7m2    Calcium 8.5 8.5 - 10.1 mg/dL    Bilirubin Total 0.4 0.2 - 1.3 mg/dL    Albumin 3.5 3.4 - 5.0 g/dL    Protein Total 7.2 6.8 - 8.8 g/dL    Alkaline Phosphatase 105 40 - 150 U/L    ALT 24 0 - 50 U/L    AST 15 0 - 45 U/L   Magnesium    Collection Time: 11/17/17  9:02 AM   Result Value Ref Range    Magnesium 1.6 1.6 - 2.3 mg/dL       Collection Time: 11/17/17  9:02 AM   Result Value Ref Range     82 (H) 0 - 30 U/mL               Follow-ups after your visit        Your next 10 appointments already scheduled     Dec 12, 2017  8:30 AM CST   [facility] Draw with  [facility] DRAW   [facility]    [address] [place] [address] [id number] [phone]            Dec 12, 2017  9:00 AM CST   (Arrive by 8:45 AM)   CT CHEST/ABDOMEN/PELVIS W CONTRAST with UCCT2   [facility] ([facility])    [address] [place] [id number] [phone]           Please bring any scans or X-rays taken at other hospitals, if similar tests were done. Also bring a list of your medicines, including vitamins, minerals and over-the-counter drugs. It is safest to leave personal items at home.  Be sure to tell your doctor:   If you have any allergies.   If there s any chance you are pregnant.   If you are breastfeeding.   If you have any special needs.  You may have contrast for this exam. To prepare:   Do not eat or drink for 2 hours before your exam. If you need to take medicine, you may take it with small sips of water. (We may ask you to take liquid  medicine as well.)   The day before your exam, drink extra fluids at least six 8-ounce glasses (unless your doctor tells you to restrict your fluids).  Patients over 70 or patients with diabetes or kidney problems:   If you haven t had a blood test (creatinine test) within the last 30 days, go to your clinic or Diagnostic Imaging Department for this test.  If you have diabetes:   If your kidney function is normal, continue taking your metformin (Avandamet, Glucophage, Glucovance, Metaglip) on the day of your exam.   If your kidney function is abnormal, wait 48 hours before restarting this medicine.  You will have oral contrast for this exam:   You will drink the contrast at home. Get this from your clinic or Diagnostic Imaging Department. Please follow the directions given.  Please wear loose clothing, such as a sweat suit or jogging clothes. Avoid snaps, zippers and other metal. We may ask you to undress and put on a hospital gown.  If you have any questions, please call the Imaging Department where you will have your exam.            Dec 14, 2017 10:40 AM CST   (Arrive by 10:25 AM)   Return Active Treatment with Nga Yeung MD   [facility] ([facility])    [address] [place] [id number] [phone]            Dec 14, 2017 11:30 AM CST   Infusion 240 with UC ONCOLOGY INFUSION, UC 31 ATC   [facility] ([facility])    [address] [place] [id number] [phone]              Future tests that were ordered for you today     Open Future Orders        Priority Expected Expires Ordered    CT Chest/Abdomen/Pelvis w Contrast Routine 12/12/2017 11/17/2018 11/17/2017            Who to contact     If you have questions or need follow up information about today's clinic visit or your schedule please contact [facility] CANCER [facility] directly at [phone].  Normal or  non-critical lab and imaging results will be communicated to you by GreenFuelhart, letter or phone within 4 business days after the clinic has received the results. If you do not hear from us within 7 days, please contact the clinic through Owned it or phone. If you have a critical or abnormal lab result, we will notify you by phone as soon as possible.  Submit refill requests through Owned it or call your pharmacy and they will forward the refill request to us. Please allow 3 business days for your refill to be completed.          Additional Information About Your Visit        Owned it Information     Owned it gives you secure access to your electronic health record. If you see a primary care provider, you can also send messages to your care team and make appointments. If you have questions, please call your primary care clinic.  If you do not have a primary care provider, please call [phone] and they will assist you.        Care EveryWhere ID     This is your Care EveryWhere ID. This could be used by other organizations to access your [facility] medical records  [id number]         Blood Pressure from Last 3 Encounters:   11/17/17 124/80   10/20/17 101/72   09/22/17 120/65    Weight from Last 3 Encounters:   11/17/17 64 kg (141 lb 1.6 oz)   10/20/17 64.7 kg (142 lb 9.6 oz)   09/22/17 62.7 kg (138 lb 4.8 oz)              We Performed the Following          CBC with platelets differential     Comprehensive metabolic panel     Magnesium          Today's Medication Changes          These changes are accurate as of: 11/17/17  2:01 PM.  If you have any questions, ask your nurse or doctor.               These medicines have changed or have updated prescriptions.        Dose/Directions    magnesium oxide 400 MG tablet   Commonly known as:  MAG-OX   This may have changed:  when to take this   Used for:  Ovarian cancer, unspecified laterality (H)        Dose:  400 mg   Take 1 tablet (400 mg) by mouth 2 times daily    Quantity:  90 tablet   Refills:  3                Primary Care Provider Office Phone # Fax #    Aubrie Hester [phone]       [facility] [address] [address] [facility] [address]        Equal Access to Services     SANDY CHAMBERS AH: Hadphyllis bj santana syedao Somanishali, waaxda luqadaha, qaybta kaalmada adenelsonyada, blanka sims laAndresmarquise szymanski. So [place] [phone].    ATENCIÓN: Si habla español, tiene a bell disposición servicios gratuitos de asistencia lingüística. Llame al [phone].    We comply with applicable federal civil rights laws and [place] laws. We do not discriminate on the basis of race, color, national origin, age, disability, sex, sexual orientation, or gender identity.            Thank you!     Thank you for choosing [facility]  for your care. Our goal is always to provide you with excellent care. Hearing back from our patients is one way we can continue to improve our services. Please take a few minutes to complete the written survey that you may receive in the mail after your visit with us. Thank you!             Your Updated Medication List - Protect others around you: Learn how to safely use, store and throw away your medicines at www.disposemymeds.org.          This list is accurate as of: 11/17/17  2:01 PM.  Always use your most recent med list.                   Brand Name Dispense Instructions for use Diagnosis    ASPIRIN PO      Take 325 mg by mouth daily        CALCIUM + D PO      Take 1 tablet by mouth daily.    Pelvic mass       cyanocobalamin 1000 MCG/ML injection    VITAMIN B12    1 mL    Inject 1 mL (1,000 mcg) into the muscle every 30 days    B12 deficiency       diphenoxylate-atropine 2.5-0.025 MG per tablet    LOMOTIL    60 tablet    Take 2 tablets by mouth 4 times daily as needed for diarrhea    Acute diarrhea       Ferrous Sulfate 324 (65 FE) MG Tbec     90 tablet    TAKE 1 TABLET BY MOUTH 3 TIMES DAILY WITH MEALS. TAKE  WITH A SMALL AMOUNT OF ORANGE JUICE. DO NOT TAKE WITH CALCIUM.    Ovarian cancer, right (H), Anemia, unspecified type, Ovarian cancer, left (H)       HERBALS      daily        LEVOTHYROXINE SODIUM PO      Take by mouth daily        LORazepam 1 MG tablet    ATIVAN    30 tablet    Take 1 tablet (1 mg) by mouth every 6 hours as needed (Anxiety, Nausea/Vomiting or Sleep)    Ovarian cancer, unspecified laterality (H)       magnesium oxide 400 MG tablet    MAG-OX    90 tablet    Take 1 tablet (400 mg) by mouth 2 times daily    Ovarian cancer, unspecified laterality (H)       METFORMIN HCL PO      Take 500 mg by mouth daily        order for DME     3 each    Injection Supplies for Vitamin B12: 3cc syringes w/ 27 gauge needles, 1/2 inch length    B12 deficiency       potassium chloride 20 MEQ Packet    KLOR-CON     Take 20 mEq by mouth daily        prochlorperazine 10 MG tablet    COMPAZINE    30 tablet    Take 1 tablet (10 mg) by mouth every 6 hours as needed (nausea/vomiting)    Encounter for long-term (current) use of medications, Ovarian cancer, right (H), Ovarian cancer, left (H), Drug-induced neutropenia (H)       sulfamethoxazole-trimethoprim 800-160 MG per tablet    BACTRIM DS/SEPTRA DS    14 tablet    Take 1 tablet by mouth 2 times daily    Dysuria       VITAMIN C PO      Take 500 mg by mouth daily    Pelvic mass       VITAMIN D PO      Take 1,000 Units by mouth daily Unknown dose        VITAMIN E NATURAL PO      Take 100 Units by mouth daily

## 2017-11-17 NOTE — MR AVS SNAPSHOT
After Visit Summary   11/17/2017    Odalys Nathan    MRN: [id number]           Patient Information     Date Of Birth          1960        Visit Information        Provider Department      11/17/2017 9:00 AM Patricia Rocha APRN CNP [facility]        Today's Diagnoses     Ovarian cancer, right (H)    -  1    Ovarian cancer, left (H)        Encounter for antineoplastic chemotherapy        Neoplastic malignant related fatigue           Follow-ups after your visit        Your next 10 appointments already scheduled     Dec 12, 2017  8:30 AM CST   [facility] Draw with  [facility] DRAW   [facility] ([facility])    [address] [id number] [phone]            Dec 12, 2017  9:00 AM CST   (Arrive by 8:45 AM)   CT CHEST/ABDOMEN/PELVIS W CONTRAST with UCCT2   [facility] [place] [facility] ([facility])    [address] [id number] [phone]           Please bring any scans or X-rays taken at other hospitals, if similar tests were done. Also bring a list of your medicines, including vitamins, minerals and over-the-counter drugs. It is safest to leave personal items at home.  Be sure to tell your doctor:   If you have any allergies.   If there s any chance you are pregnant.   If you are breastfeeding.   If you have any special needs.  You may have contrast for this exam. To prepare:   Do not eat or drink for 2 hours before your exam. If you need to take medicine, you may take it with small sips of water. (We may ask you to take liquid medicine as well.)   The day before your exam, drink extra fluids at least six 8-ounce glasses (unless your doctor tells you to restrict your fluids).  Patients over 70 or patients with diabetes or kidney problems:   If you haven t had a blood test (creatinine test) within the last 30 days, go to your clinic or  Diagnostic Imaging Department for this test.  If you have diabetes:   If your kidney function is normal, continue taking your metformin (Avandamet, Glucophage, Glucovance, Metaglip) on the day of your exam.   If your kidney function is abnormal, wait 48 hours before restarting this medicine.  You will have oral contrast for this exam:   You will drink the contrast at home. Get this from your clinic or Diagnostic Imaging Department. Please follow the directions given.  Please wear loose clothing, such as a sweat suit or jogging clothes. Avoid snaps, zippers and other metal. We may ask you to undress and put on a hospital gown.  If you have any questions, please call the Imaging Department where you will have your exam.            Dec 14, 2017 10:40 AM CST   (Arrive by 10:25 AM)   Return Active Treatment with Nga Yeung MD   [facility] ([facility])    [address] [place] [id number] [phone]            Dec 14, 2017 11:30 AM CST   Infusion 240 with UC ONCOLOGY INFUSION, UC 31 ATC   [facility] ([facility])    [address] [place] [id number] [phone]              Future tests that were ordered for you today     Open Future Orders        Priority Expected Expires Ordered    CT Chest/Abdomen/Pelvis w Contrast Routine 12/12/2017 11/17/2018 11/17/2017            Who to contact     If you have questions or need follow up information about today's clinic visit or your schedule please contact [facility] directly at [phone].  Normal or non-critical lab and imaging results will be communicated to you by MyChart, letter or phone within 4 business days after the clinic has received the results. If you do not hear from us within 7 days, please contact the clinic through MyChart or phone. If you have a critical or abnormal lab result, we will  "notify you by phone as soon as possible.  Submit refill requests through [employer] or call your pharmacy and they will forward the refill request to us. Please allow 3 business days for your refill to be completed.          Additional Information About Your Visit        [email or web address] Information     [employer] gives you secure access to your electronic health record. If you see a primary care provider, you can also send messages to your care team and make appointments. If you have questions, please call your primary care clinic.  If you do not have a primary care provider, please call [phone] and they will assist you.        Care EveryWhere ID     This is your Care EveryWhere ID. This could be used by other organizations to access your [place] medical records  [id number]        Your Vitals Were     Pulse Temperature Respirations Height Pulse Oximetry BMI (Body Mass Index)    95 98  F (36.7  C) (Oral) 16 1.651 m (5' 5\") 95% 23.48 kg/m2       Blood Pressure from Last 3 Encounters:   11/17/17 124/80   10/20/17 101/72   09/22/17 120/65    Weight from Last 3 Encounters:   11/17/17 64 kg (141 lb 1.6 oz)   10/20/17 64.7 kg (142 lb 9.6 oz)   09/22/17 62.7 kg (138 lb 4.8 oz)                 Today's Medication Changes          These changes are accurate as of: 11/17/17  2:46 PM.  If you have any questions, ask your nurse or doctor.               These medicines have changed or have updated prescriptions.        Dose/Directions    magnesium oxide 400 MG tablet   Commonly known as:  MAG-OX   This may have changed:  when to take this   Used for:  Ovarian cancer, unspecified laterality (H)        Dose:  400 mg   Take 1 tablet (400 mg) by mouth 2 times daily   Quantity:  90 tablet   Refills:  3                Primary Care Provider Office Phone # Fax #    Aubrie Airam Hester [phone]       [facility] [address] [address] [facility] [address]        Equal Access to Services     SANDY CHAMBERS AH: Felicia santana " naima Walter, wasarada lulucíastephanie, qaybta kagianluca staples, blanka carmichael teressanelson alvinlizabeth sheffieldbertha stephon. So [facility] [phone].    ATENCIÓN: Si salas longoria, tiene a bell disposición servicios gratuitos de asistencia lingüística. Liban al [phone].    We comply with applicable federal civil rights laws and [place] laws. We do not discriminate on the basis of race, color, national origin, age, disability, sex, sexual orientation, or gender identity.            Thank you!     Thank you for choosing [facility]  for your care. Our goal is always to provide you with excellent care. Hearing back from our patients is one way we can continue to improve our services. Please take a few minutes to complete the written survey that you may receive in the mail after your visit with us. Thank you!             Your Updated Medication List - Protect others around you: Learn how to safely use, store and throw away your medicines at www.disposemymeds.org.          This list is accurate as of: 11/17/17  2:46 PM.  Always use your most recent med list.                   Brand Name Dispense Instructions for use Diagnosis    ASPIRIN PO      Take 325 mg by mouth daily        CALCIUM + D PO      Take 1 tablet by mouth daily.    Pelvic mass       cyanocobalamin 1000 MCG/ML injection    VITAMIN B12    1 mL    Inject 1 mL (1,000 mcg) into the muscle every 30 days    B12 deficiency       diphenoxylate-atropine 2.5-0.025 MG per tablet    LOMOTIL    60 tablet    Take 2 tablets by mouth 4 times daily as needed for diarrhea    Acute diarrhea       Ferrous Sulfate 324 (65 FE) MG Tbec     90 tablet    TAKE 1 TABLET BY MOUTH 3 TIMES DAILY WITH MEALS. TAKE WITH A SMALL AMOUNT OF ORANGE JUICE. DO NOT TAKE WITH CALCIUM.    Ovarian cancer, right (H), Anemia, unspecified type, Ovarian cancer, left (H)       HERBALS      daily        LEVOTHYROXINE SODIUM PO      Take by mouth daily        LORazepam 1 MG tablet    ATIVAN    30 tablet     Take 1 tablet (1 mg) by mouth every 6 hours as needed (Anxiety, Nausea/Vomiting or Sleep)    Ovarian cancer, unspecified laterality (H)       magnesium oxide 400 MG tablet    MAG-OX    90 tablet    Take 1 tablet (400 mg) by mouth 2 times daily    Ovarian cancer, unspecified laterality (H)       METFORMIN HCL PO      Take 500 mg by mouth daily        order for DME     3 each    Injection Supplies for Vitamin B12: 3cc syringes w/ 27 gauge needles, 1/2 inch length    B12 deficiency       potassium chloride 20 MEQ Packet    KLOR-CON     Take 20 mEq by mouth daily        prochlorperazine 10 MG tablet    COMPAZINE    30 tablet    Take 1 tablet (10 mg) by mouth every 6 hours as needed (nausea/vomiting)    Encounter for long-term (current) use of medications, Ovarian cancer, right (H), Ovarian cancer, left (H), Drug-induced neutropenia (H)       sulfamethoxazole-trimethoprim 800-160 MG per tablet    BACTRIM DS/SEPTRA DS    14 tablet    Take 1 tablet by mouth 2 times daily    Dysuria       VITAMIN C PO      Take 500 mg by mouth daily    Pelvic mass       VITAMIN D PO      Take 1,000 Units by mouth daily Unknown dose        VITAMIN E NATURAL PO      Take 100 Units by mouth daily

## 2017-11-17 NOTE — PATIENT INSTRUCTIONS
Contact Numbers  [facility] Nurse Triage: [phone]  After Hours Nurse Line:  [phone]    Please call the [facility] Triage line if you experience a temperature greater than or equal to 100.5, shaking chills, have uncontrolled nausea, vomiting and/or diarrhea, dizziness, shortness of breath, chest pain, bleeding, unexplained bruising, or if you have any other new/concerning symptoms, questions or concerns.     If it is after hours, weekends, or holidays, please call either the after hours nurse line listed above.    If you are having any concerning symptoms or wish to speak to a provider before your next infusion visit, please call your care coordinator or triage to notify them so we can adequately serve you.     If you need a refill on a narcotic prescription or other medication, please call triage before your infusion appointment.         November 2017 Sunday Monday Tuesday Wednesday Thursday Friday Saturday                  1     2     3     4       5     6     7     8     LAB    3:00 PM   (15 min.)   CR LAB   [facility] 9     10     11       12     13     14     15     16     17     [facility] DRAW    8:30 AM   (15 min.)    [facility] DRAW   [facility] Draw     [facility] RETURN ACTIVE TREATMENT    8:45 AM   (40 min.)   Patricia Rocha APRN CNP   [facility] ONC INFUSION 240   10:00 AM   (240 min.)    ONCOLOGY INFUSION   [facility] 18       19     20     21     22     23     24     25       26     27     28     29     30                          December 2017 Sunday Monday Tuesday Wednesday Thursday Friday Saturday                            1     2       3     4     5     6     7     8     9       10     11     12     [facility] DRAW    8:30 AM   (15 min.)    [facility] DRAW   [facility] Lab Draw     CT CHEST/ABDOMEN/PELVIS W    8:45 AM   (20 min.)   UCCT2   [facility] CT 13     14      [facility] RETURN ACTIVE TREATMENT   10:25 AM   (20 min.)   Nga Yeung MD   [facility] ONC INFUSION 240   11:30 AM   (240 min.)   UC ONCOLOGY INFUSION   [facility] 15     16       17     18     19     20     21     22     23       24     25     26     27     28     29     30       31                                                Lab Results:  Recent Results (from the past 12 hour(s))   CBC with platelets differential    Collection Time: 11/17/17  9:02 AM   Result Value Ref Range    WBC 5.3 4.0 - 11.0 10e9/L    RBC Count 3.05 (L) 3.8 - 5.2 10e12/L    Hemoglobin 11.0 (L) 11.7 - 15.7 g/dL    Hematocrit 32.2 (L) 35.0 - 47.0 %     (H) 78 - 100 fl    MCH 36.1 (H) 26.5 - 33.0 pg    MCHC 34.2 31.5 - 36.5 g/dL    RDW 18.1 (H) 10.0 - 15.0 %    Platelet Count 257 150 - 450 10e9/L    Diff Method Automated Method     % Neutrophils 46.1 %    % Lymphocytes 37.3 %    % Monocytes 14.3 %    % Eosinophils 1.3 %    % Basophils 0.4 %    % Immature Granulocytes 0.6 %    Nucleated RBCs 1 (H) 0 /100    Absolute Neutrophil 2.4 1.6 - 8.3 10e9/L    Absolute Lymphocytes 2.0 0.8 - 5.3 10e9/L    Absolute Monocytes 0.8 0.0 - 1.3 10e9/L    Absolute Eosinophils 0.1 0.0 - 0.7 10e9/L    Absolute Basophils 0.0 0.0 - 0.2 10e9/L    Abs Immature Granulocytes 0.0 0 - 0.4 10e9/L    Absolute Nucleated RBC 0.0    Comprehensive metabolic panel    Collection Time: 11/17/17  9:02 AM   Result Value Ref Range    Sodium 138 133 - 144 mmol/L    Potassium 3.7 3.4 - 5.3 mmol/L    Chloride 104 94 - 109 mmol/L    Carbon Dioxide 23 20 - 32 mmol/L    Anion Gap 11 3 - 14 mmol/L    Glucose 99 70 - 99 mg/dL    Urea Nitrogen 16 7 - 30 mg/dL    Creatinine 0.70 0.52 - 1.04 mg/dL    GFR Estimate 85 >60 mL/min/1.7m2    GFR Estimate If Black >90 >60 mL/min/1.7m2    Calcium 8.5 8.5 - 10.1 mg/dL    Bilirubin Total 0.4 0.2 - 1.3 mg/dL    Albumin 3.5 3.4 - 5.0 g/dL    Protein Total 7.2 6.8 - 8.8 g/dL    Alkaline Phosphatase  105 40 - 150 U/L    ALT 24 0 - 50 U/L    AST 15 0 - 45 U/L   Magnesium    Collection Time: 11/17/17  9:02 AM   Result Value Ref Range    Magnesium 1.6 1.6 - 2.3 mg/dL       Collection Time: 11/17/17  9:02 AM   Result Value Ref Range     82 (H) 0 - 30 U/mL

## 2017-11-17 NOTE — NURSING NOTE
Chief Complaint   Patient presents with     Port Draw     labs drawn via port by RN     /80 (BP Location: Right arm, Patient Position: Chair, Cuff Size: Adult Regular)  Pulse 95  Temp 98  F (36.7  C) (Oral)  Wt 64 kg (141 lb 1.6 oz)  SpO2 95%  BMI 23.48 kg/m2    Vitals taken. Port accessed by RN. Labs collected and sent. Line flushed with NS & Heparin. Pt tolerated well. Pt checked in for next appointment.    Lina Queen RN

## 2017-11-17 NOTE — LETTER
"2017       RE: Odalys Nathan  [address]  [facility] [id number]     Dear Colleague,    Thank you for referring your patient, Odalys Nathan, to the [facility]. Please see a copy of my visit note below.    Gynecologic Oncology Follow-Up Note  RE: Odalys Nathan  MRN: [id number]  : 1960  Date of Visit: 2017    CC: Odalys Nathan is a 57 year old year old female with recurrent stage IIIC bilateral ovarian cancer who presents today for disease management with Doxil.    HPI: Odalys comes to the clinic accompanied by her  Marcos. She is feeling well today, however, notes that her fatigue continues to worsen. She is able to carry out ADLs and was able to travel to [place] recently but states that chemotherapy is taking a toll on her. Having more diarrhea than normal- takes Lomotil as needed. Felt like she \"burned my insides\" when she ate a hot dinner of chicken and mashed potatoes- feeling improved now. Denies any other skin/mucosal issues with Doxil. Had been having symptoms of a UTI and a UA was positive for hematuria, however, symptoms resolved with drinking increased amounts of water. She continues on ferrous sulfate 325mg TID for history of anemia. She also continues tinnitus- denies need for intervention. Has had many cycles of carboplatin, no history of reaction thus far. She is eating and drinking well, reports she is ready for chemotherapy today.      Brief Oncology History:  2012 - Admitted to hospital for 2 weeks of intermittent abdominal cramping, distention, diarrhea and N/V. CT of abdomen/pelvis significant for small bowel obstruction, a heterogenous soft tissue density in the pelvis, omental nodules, and ascites. Bilateral adnexal masses per U/S of pelvis. CA-125 was elevated at 987, CEA was normal at 1.0.    12 - Therapeutic paracentesis (4 L) with cytology confirming malignancy (NJ positive, weak ER positive, CK-7 " positive consistent with GYN primary). Surgery recommended d/t potential for falling blood counts 2/2 chemotherapy (patient is [place] and limiting blood transfusion)    2/1/12 - Exploratory laparotomy, MAR BSO, lysis of adhesion, Appendectomy, Repair cystotomy, Omentectomy Vpey-exluqp-igubihmlx-transverse-colon resection, Ileo-descending colon anastomosis, CUSA, & Colonoscopy (done by Dr. Amato and colorectal team).  2/20/2012 - Admitted to hospital for bilateral pulmonary emboli and drainage of pleural effusion. Started on Lovenox.  2/28/12-3/21/12: Cycle #1-2 Carbo/Taxol IV  3/29/12 - Started on Keflex by her PCP for infection in her healing wound (immediately below her umbilicus).    4/11/12-6/14/12: Cycle #3-6 IV chemo, Cycle #1 IV/IP chemo  7/16/12 -  8, CT PRADEEP - enrolled in  - observation arm  3/4/13-6/28/13:  6, 9, 12, 15, 20.  7/1/13: CT Chest, Abdomen, Pelvis IMPRESSION:  1. Worsening metastatic ovarian carcinoma suggested by increased size of soft tissue nodules anterior to the right psoas muscle, that may represent growing mesenteric lymphadenopathy.  2. Remaining prominent right lower quadrant mesenteric lymph nodes are not significantly changed from CT 7/16/2012.  3. Clustered nodular opacities in the right lower lobe are not significant change from 7/16/2012 and remain indeterminate. Again, the appearance and distribution is suggestive of an infectious etiology.  4. Stable 8 mm soft tissue nodule in left breast, unchanged since at least 02/20/2012. This can be observed on followup studies, but correlation with mammography could be considered.  Decision made to start Doxil/Carbo.  7/11/13: The left ventricular ejection fraction is normal at 66.4%.  7/12/13-9/6/13: Cycle #1-3 Doxil/Carbo.  10, 11.    9/30/13 CT C/A/P Impression:  1. Overall, favorable response to treatment with decreasing size of soft tissue nodules tracking along the anterior aspect of the right psoas  muscle.  2. 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  3. Improved cluster of right lower lobe pulmonary nodular opacities. These may represent resolving infection.  10/3/13-12/9/13:  9, 8, 10. Cycle 4-6 Doxil/Carbo.    1/13/14 CT C/A/P Impression:   1. Stable appearance of metastatic ovarian cancer. Scattered soft tissue nodules along the anterior aspect of the right psoas muscle are unchanged in size. Mild mesenteric lymphadenopathy is unchanged.  2. Clustered micronodules in the right lower lobe are unchanged from 9/30/2013, but improved from 7/1/2013. This history suggests a postinflammatory/postinfectious etiology.  3. 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  1/16/14 Discussed multiple options for her based on relatively stable-appearing disease on CT but slight increase in  (which has had small increase to 20 with last recurrence) including chemo break with recheck of  in 1 month, starting new chemo agent immediately, and exploratory surgery with possible resection of nodules. She is considered platinum-sensitive based on > 1 year remission after Taxol/Carbo, which we will take into consideration for future chemo planning. I am not inclined to surgery at this time given difficulty she already has with diarrhea secondary to past colon resection. I suspect we would need to resect further bowel due to mesenteric disease. Also explained inherent risks of any major surgery. Also mentioned maintenance chemo, but this has not been shown to increase overall survival and would likely decrease her quality of life without significant benefit. Family is going on vacation to [place] in 2 weeks and Odalys does not want to have chemo prior to that, so will plan to take 1 month break. She can have  at that time (discussed checking toady since last draw was in early December, but as it would likely not change treatment plan and she has h/o slow rising , will not check  "today).  2/17/14  16    2/20/14: Decision to take break from chemo for two months, followed by CT and CA-125.    4/21/14  27  4/21/14 CT C/A/P Impression:    1. Increased size of 2 low-attenuation lymph nodes anterior to the right psoas muscle is concerning for worsening metastatic ovarian cancer.    2. New circumferential thickening of a 3.8 cm length segment of distal transverse colon is likely physiologic. Recommend attention on followup imaging.    3. Grossly unchanged size of clustered small nodules versus scarring in the right lower lobe the lungs.    4. Stable thrombosis of the right ovarian vein.  5/14/14: Diagnostic laparoscopy converted to exploratory laparotomy and removal of mesenteric masses, tumor debulking, peritoneal biopsies and intraperitoneal port placement. On laparoscopy, it was noted that there were small nodules on the anterior abdominal wall near the previous incision, small nodules on the right pelvic sidewall as well. Nodules were palpated in the mesentery; however, as it was unable to clarify where the origin of the nodules was, the decision was made to open the patient. On opening there was found to be approximately a 3 cm nodule in the small bowel mesentery and another separate approximately 2 cm nodule in the bowel mesentery. Pelvis without evidence of cancer, some mesenteric lymph nodes were palpated. No evidence otherwise of any disseminated cancer throughout the abdomen.    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 (2/2)  -Largest metastasis: 1.3 cm  -See comment  D: Peritoneum, right paracolic gutter #2, " "biopsy:  -Fibroadipose tissue with granulomatous inflammation surrounding refractile material  -Negative for malignancy   E: Small bowel adhesion, biopsy:  -Fibroconnective tissue, consistent with adhesion  -Negative for malignancy  F: Lymph nodes, mesentry, not otherwise specified, excision:  -Two lymph nodes, negative for metastatic carcinoma (0/2)  G: Lymph node, mesentery, \"#2\", excision:  -One lymph node, negative for metastatic carcinoma (0/1)  H: Lymph nodes, mesentery, \"nodule #2\", excision:  -Five lymph nodes, negative for metastatic carcinoma (0/5)  COMMENT:  Some of the specimens show post-operative changes. Others show possible treatment related changes, including necrosis. The metastatic carcinoma in the mesenteric lymph nodes (specimen C) shows variable morphology, including relatively low grade tumor with papillary architecture, and high grade tumor comprised of nests of tumor cells with irregular, slit-like spaces and marked nuclear pleomorphism.    5/29/14: Cycle 1 IV PACLitaxel / IP CISplatin / IP PACLitaxel.  - 28.  6/26/14: Cycle #2 IV/IP.  10  8/5/14: CT chest/abd/pelvis IMPRESSION     1. In this patient with ovarian cancer, overall findings are indicative of stable/slight improvement, as multiple mesenteric lymphadenopathy and scattered nodular peritoneal soft tissue mass lesions appear unchanged or slightly smaller since 4/21/2014.    2. 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 Mild dilatation of the second and the third portion of the duodenum with a narrow SMA angle. This could represent SMA syndrome, if clinically correlated.17/14:    Cycle #3 IV/IP.  10.    CT chest/abd/pelvis with contrast on 8/5/14    Impression:    1. In this patient with ovarian cancer, overall findings are indicative of stable/slight improvement, as multiple mesenteric lymphadenopathy and scattered nodular peritoneal soft tissue mass lesions appear unchanged or slightly smaller " since 4/21/2014.    2. 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 Mild dilatation of the second and the third portion of the duodenum with a narrow SMA angle. This could represent SMA syndrome, if clinically correlated.  8/7/14: Cycle #4 Taxol/Carbo (changed from IV/IP).  10. She has been feeling okay. She is unsure if she can finish out the course of 6 cycles IV/IP taxol/cisplatin. She feels like she has the flu for about a week then starts feeling gradually better after each chemo cycle. Her spouse notes that she actually has been more sick with the treatments than she initially admits here. She was also previously having some rib pain. Denies any rib pain now. Denies any chest pain or shortness of breath.  Plan: discussed recent CT cap results and switching to just IV as she is feeling miserable with IP treatments. Switch to IV carbo/taxol as patient is platinum sensitive.  We also discussed her taking part of the tesaro trial, which would require BRCA testing. She would like to take part in this trial if eligible.  8/20/14: Remove Intraperitoneal Port ( Port and catheter intact - discarded)  8/28/14: Cycle #5 Taxol/Carbo held due to thrombocytopenia.  6.    She denies any vaginal bleeding, no changes in her bowel or bladder habits, no nausea/emesis, no lower extremity edema, and no difficulties eating or sleeping. She denies any abdominal discomfort/bloating, no fevers or chills, and no chest pain or shortness of breath. She states her diarrhea is the same. She reports some fatigue which improves about 1-2 weeks after her chemotherapy. She states she does not need any medication refills and she was told she does not meet the criteria for the TESARO trial. She states she has 3 bags of iv fluids left over from her previous chemotherapy and will give these to herself. She states she is ready for her treatment today.    9/29/14: Cycle #6 Taxol/Carbo  6. Insurance questions  regarding GSF coverage today. No concerns other than fatigue. Taking iron for anemia and does not desire blood transfusion. Using neulasta for neutropenia. Using home IV hydration if needed. Baseline unchanged. No abdominal bloating, constipation, diarrhea, pain, vaginal or rectal bleeding, cough or dyspnea, fluid retention.    10/16/14: Impression:    1. Nodular peritoneal soft tissue mass in the right lower quadrant adjacent to the psoas muscle is no longer appreciated. Adjacent prominent lymphadenopathy is unchanged from previous exam. No new peritoneal lesions.    2. 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    10/20/14:  5. CT chest/abdomen/pelvis on 10/16/14 showed nodular peritoneal soft tissue mass in the right lower quadrant adjacent to the psoas muscle is no longer appreciated. Adjacent prominent lymphadenopathy is unchanged from previous exam. 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  1/27/15:  6.  4/28/15:  14.  5/26/15:  18.  6/2/15: CT cap Impression:  1. Postsurgical changes of hysterectomy and bilateral salpingo-oophorectomy for ovarian cancer. There is a new 8 mm hazy, ill-defined hypoattenuating lesion in hepatic segment 6 which is suspicious for a metastatic deposit. Further evaluation with ultrasound in recommended.    2. Increased size of a left retroperitoneal lymph node which is indeterminate but may represent a ciro metastasis. Mildly prominent lymph nodes in the right lower quadrant are not significantly changed.  3. Moderate colonic stool burden.    6/4/15: US abdomen IMPRESSION:    Hyperechoic lesion in the right hepatic lobe, consistent with hemangioma. This does not corresponds to the area of the lesion seen on CT from 6/2/2015. An MR would be helpful for identifying and characterizing the lesion from the recent CT.  6/12/15: MR abd IMPRESSION:  1. New 20 x 11 mm enhancing lesions between the right  obliques, concerning for metastatic disease. This lesions should be amenable to percutaneous biopsy, if indicated.  2. Correlating to the lesion visualized on comparison CT is a hepatic segment 6 subcapsular 7 mm lesion. Overall the appearance favors the diagnosis of a simple cyst. However, there is faint suggestion of mild peripheral arterial enhancement. Although this is favored as  artifactual, this should be followed up to confirm stability. Recommend 6 month followup.  3. Hepatic segment 6, 5 mm lesion too small to technically characterize. Differential would favor FNH, less likely flash filling hemangioma. Recommend attention on followup.  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     9/1/15: Cycle #1 Avastin/Cytoxan.   31.     9/24/15:feeling generally well. She says she has been having back and stomach spasms. She is taking cytosine daily (she ran out yesterday). She also says its affecting her voice. Admits that it burns occasionally. She is eating and drinking normal. She also admit diarrhea, 5-7 times daily, lose/watery. She trying to stay hydrated and eat fiber. She also says that her body is sore, especially the bottom of her feet. Her blood pressure is normal. She also admits having headache after her first infusion.      9/24/15: Cycle #2 Avastin/Cytoxan.  19.  10/15/15: Cycle #3 Avastin/Cytoxan.  16.     11/6/15: CT c/a/p IMPRESSION:    1. Stable postoperative change of MAR/BSO for ovarian cancer.  2. The lesion in the right flank abdominal musculature is slightly decreased in size. Otherwise, stable examination.  2. No evidence of metastatic disease in the chest.    11/20/15: Treatment planning visit,  16    11/25/15: surgical pathology report  FINAL DIAGNOSIS:  Soft tissue, right oblique muscle mass, excision:  -Recurrent ovarian serous  carcinoma  -Carcinoma is present less than 1 mm from one resection margin  -Background skeletal muscle and fibroadipose tissue    1/4/16:  22  1/11/16-1/27/16: Radiation to right flank x 12 treatments  4/7/2016:  94. CT cap IMPRESSION:    In this patient with ovarian cancer status post MAR/BSO and descending/transverse colectomy:  1. No evidence for malignancy in the chest, abdomen, or pelvis.  2. Stable small hypodense segment 6 liver lesion, appears more likely benign, possibly a cyst.  5/13/16:  124.  6/3/16: PET CT IMPRESSION: In this patient with a history of ovarian cancer:  1. Hypermetabolic and enlarging periaortic and perihepatic lymphadenopathy compatible with metastatic disease, as detailed above.  2. Although hypodense lesion in hepatic segment 6 has been present since 6/2/2015 associated hypermetabolism makes this lesion highly concerning for metastatic disease.    Plan: to start Niraparib under TESARO study.     6/9/16:  137.  6/14/16: Cycle #1 Niraparib.    6/28/16: Cycle #1 D15 Niraparib.    7/5/16:  100.    7/11/16: Cycle #2 Nraparib.   83.    8/3/2016: PET CT IMPRESSION:    In this patient with known history of ovarian cancer:  1) New pleural based nodular opacities in the lateral and inferior aspects of the bilateral lower lobes, worse in the left lung. Likely infection. Close follow up is recommended.      2) Slight decrease in hypermetabolic abdominal lymphadenopathy. 2 hypermetabolic lymph nodes persist.  3) Unchanged right hepatic lobe metastatic lesion.     8/9/16: Cycle #3 Niraparib.  69.  9/6/16: Cycle #4 Niraparib.  53. Dose held due to anemia.  9/13/16: Eval for potential cycle 4 niraparib. Dose held due to anemia.  10/4/16: CT CAP impression:  IMPRESSION: In this patient with a known history of ovarian cancer:  1. There has been interval resolution of pleural-based nodular  opacities which likely represented infection.  2. Abdominal  lymphadenopathy in the form of 2 portacaval lymph nodes  have not significantly changed in size, noted to be hypermetabolic on  prior PET/CT.  3. Previously demonstrated metastatic lesion in the right lobe of the  liver is not significantly changed.  10/11/16:  60  11/1/16: C6 niraparib,  75  11/29/16: C7 niraparib.  78. CT CAP impression as follows:  Target lesions (RECIST criteria):       A previously described target lesion superior to the head of the  pancreas (series 2, image 64)  (referred to as a perihepatic node on  6/3/2016) may not be a valid target lesion because it measured less  than 1.5 cm originally. However, this particular node has decreased in  size, now measuring 7 mm in short axis versus 14 mm on 6/3/2016 when  measured in a similar fashion.       2.3 cm short axis portacaval lymph node on series 2 image 67,  previously 2.0 cm on 10/4/2016.       1.2 cm subtle hypodensity in hepatic segment 6 on series 2 image 75,  stable on multiple studies since at least 6/3/2016     Sum of diameters today: 3.5 cm. Sum of diameters 10/4/2016: 3.2 cm.  Growth = 9%.    12/27/16: C8 niraparib.  105.  1/25/17: C9 niraparib.  108.  2/23/17: C10 niraparib.  132.   CT CAP impression:  Sum of target lesion diameters today: 3.7 cm. Sum of target lesion  diameters on 11/28/2016: 3.5 cm. Growth= 6%  1. In this patient with history of ovarian cancer there is stable  disease by RECIST criteria as evidenced by:   1a. Mildly increased size of liver metastasis.  1b. Stable portacaval lymphadenopathy.  1c. No evidence of metastatic disease in the chest.  2. Trace emphysematous changes of the lungs.  3/22/17: C11 niraparib.  132.  4/19/17: C12 niraparib.  127.  5/16/17: CT CAP IMPRESSION: In this patient with ovarian cancer:  1. Mildly increased size of hepatic metastasis segment 6 with subtle increased capsular retraction.  2. Stable edmundo hepatis nodes, with mild increase in size  of periaortic lymph nodes.  3. Prominent left supraclavicular lymph node with subtle increase in size compared to prior studies, particularly comparing to 10/4/2016.  4. Mild subtle groundglass opacities in the right upper lobe, not present on prior study, lesser extent in the right lower lobe.  Findings may represent infection, additional consideration is malignancy (less likely), and attention on follow-up study  Recommended.  Addendum:   Prominent left supraclavicular lymph node (3/25) is stable from most recent CT performed 2/20/2017, currently measuring 14 x 16 mm, previously 14 x 16 mm on 2/20/2017.      The portal caval lymph node/edmundo hepatis lymph node is stable from 2/20/2017, measuring 21 mm.      Clarification of size of the para-aortic lymph node (series 3 image 327). It short axis measurement is 11 mm versus 9 mm on prior study.      Hepatic segment 6 triangular-shaped low density lesion (3/362) measures 14 mm, previously measured 12 mm, demonstrating a  possible/questionable minimal subtle increase in size. Similarly the lymph nodes noted above in the supraclavicular and para-aortic regions demonstrate possible minimal possible subtle increase in size.      5/18/17: Cycle #13 Niraparib.  191.  6/15/17: Cycle #14 Niraparib.  146.  7/13/17: Cycle #15 Niraparib 200 mg.  171     CT (8/9/17):       IMPRESSION:  1. Segment 6 hepatic metastasis is stable to minimally increased in  size.  2. Slight increase in multicentric adenopathy, most pronounced at the  edmundo hepatis. Additional sites include the inferior left  neck/supraclavicular region and retroperitoneum which appear stable to  minimally increased.      8/10/17:  175  9/14/17: MUGA LVEF 54%  9/22/17: C1D1 carboplatin/Doxil.  187.  10/19/17: C2D1 carboplatin/Doxil.  108.  11/17/17: C3D1 carboplatin/Doxil.  82.    Past Medical History:   Diagnosis Date     Antiplatelet or antithrombotic long-term use       Ascites      Blood clot in the legs      Diabetes (H)      Ovarian cancer (H)     serous,stg IV     Pleural effusion      Pulmonary embolism (H) 2/2012     Refusal of blood transfusions as patient is [place]      Short gut syndrome      Subclinical hypothyroidism 4/18/2013     Thrombosis of leg        Past Surgical History:   Procedure Laterality Date     COLECTOMY       COLONOSCOPY  2/1/2012    Procedure:COLONOSCOPY; With Biopsy; Surgeon:TONI SULLIVAN; Location:[facility]     HYSTERECTOMY TOTAL ABD, RHIANNA SALPINGO-OOPHORECTOMY, NODE DISSECTION, TUMOR DEBULKING, COMBINED  2/1/2012    Procedure:COMBINED HYSTERECTOMY TOTAL ABDOMINAL, BILATERAL SALPINGO-OOPHORECTOMY, NODE DISSECTION, TUMOR DEBULKING;  Exploratory Laparotomy, Total Abdominal Hysterectomy, Bilateral Salpingo-Oophorectomy, appendectomy,lysis of adhesions, ileal, ascending, transverse and splenic flexure resection, ileal descending bowel renanastomosis, incidental cystotomy repair, CUSA procedure and colonoscopy ; [facility]     INSERT PORT PERITONEAL ACCESS  4/3/2012    Procedure:INSERT PORT PERITONEAL ACCESS; Intraperitoneal Port Placement (c-arm); Surgeon:SAMUEL CARRASCO; Location:[facility]     INSERT PORT PERITONEAL ACCESS  5/14/2014    Procedure: INSERT PORT PERITONEAL ACCESS;  Surgeon: Nga Yeung MD;  Location: [facility] OR     INSERT PORT VASCULAR ACCESS       LAPAROSCOPY DIAGNOSTIC (GYN)  5/14/2014    Procedure: LAPAROSCOPY DIAGNOSTIC (GYN);  Surgeon: Nga Yeung MD;  Location: [facility] OR     LAPAROTOMY EXPLORATORY Right 11/25/2015    Procedure: LAPAROTOMY EXPLORATORY;  Surgeon: Nga Yeung MD;  Location: [facility] OR     LAPAROTOMY, TUMOR DEBULKING, COMBINED  5/14/2014    Procedure: COMBINED LAPAROTOMY, TUMOR DEBULKING;  Surgeon: Nga Yeung MD;  Location: [facility] OR     REMOVE CATHETER PERITONEAL N/A 8/20/2014    Procedure: REMOVE CATHETER PERITONEAL;  Surgeon: Nga Yeung MD;  Location: [facility] OR     VASCULAR SURGERY       stent left iliac vein       Current Outpatient Prescriptions   Medication     sulfamethoxazole-trimethoprim (BACTRIM DS/SEPTRA DS) 800-160 MG per tablet     Ferrous Sulfate 324 (65 FE) MG TBEC     LORazepam (ATIVAN) 1 MG tablet     prochlorperazine (COMPAZINE) 10 MG tablet     LEVOTHYROXINE SODIUM PO     order for DME     METFORMIN HCL PO     diphenoxylate-atropine (LOMOTIL) 2.5-0.025 MG per tablet     cyanocobalamin (VITAMIN B12) 1000 MCG/ML injection     magnesium oxide (MAG-OX) 400 MG tablet     ASPIRIN PO     potassium chloride (KLOR-CON) 20 MEQ packet     VITAMIN E NATURAL PO     Cholecalciferol (VITAMIN D PO)     HERBALS     Ascorbic Acid (VITAMIN C PO)     Calcium Carbonate-Vitamin D (CALCIUM + D PO)     No current facility-administered medications for this visit.      Facility-Administered Medications Ordered in Other Visits   Medication     heparin 100 UNIT/ML injection 500 Units          Allergies   Allergen Reactions     Nkda [No Known Drug Allergies]        Family History   Problem Relation Age of Onset     CANCER Mother 69     lung, smoker     CANCER Maternal Uncle 65     brain     Colon Cancer Maternal Aunt 80     colon       Social History     Social History     Marital status:      Spouse name: N/A     Number of children: N/A     Years of education: N/A     Occupational History     Not on file.     Social History Main Topics     Smoking status: Former Smoker     Years: 8.00     Types: Cigarettes     Quit date: 6/21/1980     Smokeless tobacco: Never Used      Comment: started at 9 yo and quit at 18 yo     Alcohol use Yes      Comment: 3x/day wine or jodi     Drug use: No     Sexual activity: Not on file     Other Topics Concern     Not on file     Social History Narrative       Review of Systems     Constitutional:  Positive for fatigue. Negative for fever, chills, weight loss, weight gain, decreased appetite, night sweats, recent stressors, height gain, height loss, post-operative  complications, incisional pain, hallucinations, increased energy, hyperactivity and confused.   HENT:  Positive for tinnitus. Negative for ear pain, hearing loss, nosebleeds, trouble swallowing, hoarse voice, mouth sores, sore throat, ear discharge, tooth pain, gum tenderness, taste disturbance, smell disturbance, hearing aid, bleeding gums, dry mouth, sinus pain, sinus congestion and neck mass.    Eyes:  Negative for double vision, pain, redness, eye pain, decreased vision, eye watering, eye bulging, eye dryness, flashing lights, spots, floaters, strabismus, tunnel vision, jaundice and eye irritation.   Respiratory:   Negative for cough, hemoptysis, sputum production, shortness of breath, wheezing, sleep disturbances due to breathing, snores loudly, respiratory pain, dyspnea on exertion, cough disturbing sleep and postural dyspnea.    Cardiovascular:  Negative for chest pain, dyspnea on exertion, palpitations, orthopnea, claudication, leg swelling, fingers/toes turn blue, hypertension, hypotension, syncope, history of heart murmur, chest pain on exertion, chest pain at rest, pacemaker, few scattered varicosities, leg pain, sleep disturbances due to breathing, tachycardia, light-headedness, exercise intolerance and edema.   Gastrointestinal:  Positive for diarrhea. Negative for heartburn, nausea, vomiting, abdominal pain, constipation, blood in stool, melena, rectal pain, bloating, hemorrhoids, bowel incontinence, jaundice, rectal bleeding, coffee ground emesis and change in stool.   Genitourinary:  Positive for dysuria and hematuria. Negative for bladder incontinence, urgency, flank pain, vaginal discharge, difficulty urinating, genital sores, dyspareunia, decreased libido, nocturia, voiding less frequently, arousal difficulty, abnormal vaginal bleeding, excessive menstruation, menstrual changes, hot flashes, vaginal dryness and postmenopausal bleeding.   Musculoskeletal:  Negative for myalgias, back pain, joint  swelling, arthralgias, stiffness, muscle cramps, neck pain, bone pain, muscle weakness and fracture.   Skin:  Negative for nail changes, itching, poor wound healing, rash, hair changes, skin changes, acne, warts, poor wound healing, scarring, flaky skin, Raynaud's phenomenon, sensitivity to sunlight and skin thickening.   Neurological:  Negative for dizziness, tingling, tremors, speech change, seizures, loss of consciousness, weakness, light-headedness, numbness, headaches, disturbances in coordination, extremity numbness, memory loss, difficulty walking and paralysis.   Endo/Heme:  Negative for anemia, swollen glands and bruises/bleeds easily.   Psychiatric/Behavioral:  Negative for depression, hallucinations, memory loss, decreased concentration, mood swings and panic attacks.    Breast:  Negative for breast discharge, breast mass, breast pain and nipple retraction.   Endocrine:  Negative for altered temperature regulation, polyphagia, polydipsia, unwanted hair growth and change in facial hair.        Physical Exam:    /80 (BP Location: Right arm, Patient Position: Chair, Cuff Size: Adult Regular)  Pulse 95  Temp 98  F (36.7  C) (Oral)  Wt 64 kg (141 lb 1.6 oz)  SpO2 95%  BMI 23.48 kg/m2    General: Alert non-toxic appearing female in no acute distress  HEENT: Normocephalic, atraumatic; PERRLA; no scleral icterus; oropharynx pink without lesions; neck supple; small enlarged left supraclavicular lymph node roughly 1cm in size  Pulmonary: Lungs clear to auscultation, no increased work of breathing noted  Cardiac: Regular rate and rhythm, S1S2, no clicks, murmurs, rubs, or gallops; bilateral lower extremities without edema  GI: Normoactive bowel sounds x4 quadrants, abdomen soft, non-distended, and non-tender to palpation without masses or organomegaly  : Not indicated  Heme: Cervical, supraclavicular, and inguinal nodes without lymphadenopathy  MSK: Moves all extremities, no obvious muscle  wasting  Neuro: No gross deficits, normal gait  Skin: Appropriate color for race, warm and dry, no rashes or lesions to unclothed skin; no palmar plantar erythrodysesthesia  Psych: Pleasant and interactive, affect bright, makes appropriate eye contact, thought process linear    Labs:      11/17/2017  Day 1   Hemoglobin 11.7 - 15.7 g/dL 11.0 (A)   Hematocrit 35.0 - 47.0 % 32.2 (A)   Platelet Count 150 - 450 10e9/L 257   Absolute Neutrophil 1.6 - 8.3 10e9/L 2.4   Sodium 133 - 144 mmol/L 138   Potassium 3.4 - 5.3 mmol/L 3.7   Chloride 94 - 109 mmol/L 104   Carbon Dioxide 20 - 32 mmol/L 23   Urea Nitrogen 7 - 30 mg/dL 16   Creatinine 0.52 - 1.04 mg/dL 0.70   Calcium 8.5 - 10.1 mg/dL 8.5   Magnesium 1.6 - 2.3 mg/dL 1.6   Bilirubin Total 0.2 - 1.3 mg/dL 0.4   ALT 0 - 50 U/L 24   AST 0 - 45 U/L 15   Alkaline Phosphatase 40 - 150 U/L 105   Albumin 3.4 - 5.0 g/dL 3.5   Protein Total 6.8 - 8.8 g/dL 7.2   WBC 4.0 - 11.0 10e9/L 5.3      82      Assessment/Plan:  1) Recurrent stage IIIC bilateral ovarian cancer: Currently feeling well other than fatigue. Tolerating chemotherapy without dose limiting side effects, no apparent palmar plantar erythrodysesthesia. Proceed with C3D1 carboplatin/Doxil as ordered by Dr. Yeung. To have CT imaging and treatment planning in four weeks with Dr. Yeung. States she would prefer to restart a PARP inhibitor if possible- states she tolerated this better, but would proceed on carboplatin/Doxil if this is what Dr. Antoine recommends. Reviewed risks of carboplatin reaction and s/sxs reaction and when to call her nurse/seek further care. Reviewed signs and symptoms for when she should contact the clinic or seek additional care, including but not limited to fever, chills, inability to keep down food or fluids, nausea and vomiting not controlled with antiemetics, and diarrhea leading to dehydration. Patient to contact the clinic with any questions or concerns in the interim.   2) Genetic  counseling: Testing has been performed and she is negative for mutations in BRCA1, BRCA2, EPCAM, MLH1, MSH2, MSH6, PMS2, PTEN, and TP53 genes  3) Health maintenance issues discussed include to continue following up with PCP for non-gynecologic concerns.  4) Patient verbalized understanding of and agreement with plan    A total of 20 minutes spent with patient, over 50% spent in counseling and coordination of care.    HAILE De Paz, FNP-C  Family Nurse Practitioner  Gynecologic Oncology  [facility]  Pager: [phone]

## 2017-11-17 NOTE — NURSING NOTE
"Oncology Rooming Note    November 17, 2017 9:09 AM   Odalys Nathan is a 57 year old female who presents for:    Chief Complaint   Patient presents with     Port Draw     labs drawn via port by RN     Oncology Clinic Visit     Pre-Chemo Visit, Ovarian cancer, right and left (H)     Initial Vitals: /80 (BP Location: Right arm, Patient Position: Chair, Cuff Size: Adult Regular)  Pulse 95  Temp 98  F (36.7  C) (Oral)  Resp 16  Ht 1.651 m (5' 5\")  Wt 64 kg (141 lb 1.6 oz)  SpO2 95%  BMI 23.48 kg/m2 Estimated body mass index is 23.48 kg/(m^2) as calculated from the following:    Height as of this encounter: 1.651 m (5' 5\").    Weight as of this encounter: 64 kg (141 lb 1.6 oz). Body surface area is 1.71 meters squared.  No Pain (0) Comment: Data Unavailable   No LMP recorded. Patient has had a hysterectomy.  Allergies reviewed: Yes  Medications reviewed: Yes    Medications: Medication refills not needed today.  Pharmacy name entered into slinkset:    [id number] - [place], [place] - [address] [facility] - [place], [place] - [address]  [facility] [place], [place] - [address] [facility] [place], [place] - [phone]    Clinical concerns: None Patricia Rocha was NOT notified.    7 minutes for nursing intake (face to face time)     Maryanne Garcia LPN              "

## 2017-11-28 DIAGNOSIS — C56.1 OVARIAN CANCER, RIGHT (H): Primary | ICD-10-CM

## 2017-11-28 DIAGNOSIS — C79.9 METASTATIC CANCER (H): ICD-10-CM

## 2017-12-12 DIAGNOSIS — C56.1 OVARIAN CANCER, RIGHT (H): ICD-10-CM

## 2017-12-12 DIAGNOSIS — C56.1 OVARIAN CANCER, RIGHT (H): Primary | ICD-10-CM

## 2017-12-12 LAB
ALBUMIN SERPL-MCNC: 3.4 G/DL (ref 3.4–5)
ALP SERPL-CCNC: 113 U/L (ref 40–150)
ALT SERPL W P-5'-P-CCNC: 26 U/L (ref 0–50)
ANION GAP SERPL CALCULATED.3IONS-SCNC: 10 MMOL/L (ref 3–14)
AST SERPL W P-5'-P-CCNC: 22 U/L (ref 0–45)
BASOPHILS # BLD AUTO: 0 10E9/L (ref 0–0.2)
BASOPHILS NFR BLD AUTO: 0.4 %
BILIRUB SERPL-MCNC: 0.4 MG/DL (ref 0.2–1.3)
BUN SERPL-MCNC: 15 MG/DL (ref 7–30)
CALCIUM SERPL-MCNC: 8.7 MG/DL (ref 8.5–10.1)
CANCER AG125 SERPL-ACNC: 83 U/ML (ref 0–30)
CHLORIDE SERPL-SCNC: 103 MMOL/L (ref 94–109)
CO2 SERPL-SCNC: 23 MMOL/L (ref 20–32)
CREAT SERPL-MCNC: 0.74 MG/DL (ref 0.52–1.04)
DIFFERENTIAL METHOD BLD: ABNORMAL
EOSINOPHIL # BLD AUTO: 0.1 10E9/L (ref 0–0.7)
EOSINOPHIL NFR BLD AUTO: 1 %
ERYTHROCYTE [DISTWIDTH] IN BLOOD BY AUTOMATED COUNT: 19.2 % (ref 10–15)
GFR SERPL CREATININE-BSD FRML MDRD: 80 ML/MIN/1.7M2
GLUCOSE SERPL-MCNC: 102 MG/DL (ref 70–99)
HCT VFR BLD AUTO: 31.3 % (ref 35–47)
HGB BLD-MCNC: 10.4 G/DL (ref 11.7–15.7)
IMM GRANULOCYTES # BLD: 0 10E9/L (ref 0–0.4)
IMM GRANULOCYTES NFR BLD: 0.2 %
LYMPHOCYTES # BLD AUTO: 1.6 10E9/L (ref 0.8–5.3)
LYMPHOCYTES NFR BLD AUTO: 32.6 %
MAGNESIUM SERPL-MCNC: 1.3 MG/DL (ref 1.6–2.3)
MCH RBC QN AUTO: 37.5 PG (ref 26.5–33)
MCHC RBC AUTO-ENTMCNC: 33.2 G/DL (ref 31.5–36.5)
MCV RBC AUTO: 113 FL (ref 78–100)
MONOCYTES # BLD AUTO: 0.7 10E9/L (ref 0–1.3)
MONOCYTES NFR BLD AUTO: 14.1 %
NEUTROPHILS # BLD AUTO: 2.5 10E9/L (ref 1.6–8.3)
NEUTROPHILS NFR BLD AUTO: 51.7 %
NRBC # BLD AUTO: 0 10*3/UL
NRBC BLD AUTO-RTO: 0 /100
PLATELET # BLD AUTO: 123 10E9/L (ref 150–450)
POTASSIUM SERPL-SCNC: 3.5 MMOL/L (ref 3.4–5.3)
PROT SERPL-MCNC: 7.1 G/DL (ref 6.8–8.8)
RBC # BLD AUTO: 2.77 10E12/L (ref 3.8–5.2)
SODIUM SERPL-SCNC: 136 MMOL/L (ref 133–144)
WBC # BLD AUTO: 4.8 10E9/L (ref 4–11)

## 2017-12-12 PROCEDURE — 85025 COMPLETE CBC W/AUTO DIFF WBC: CPT | Performed by: OBSTETRICS & GYNECOLOGY

## 2017-12-12 PROCEDURE — 86304 IMMUNOASSAY TUMOR CA 125: CPT | Performed by: OBSTETRICS & GYNECOLOGY

## 2017-12-12 PROCEDURE — 80053 COMPREHEN METABOLIC PANEL: CPT | Performed by: OBSTETRICS & GYNECOLOGY

## 2017-12-12 PROCEDURE — 83735 ASSAY OF MAGNESIUM: CPT | Performed by: OBSTETRICS & GYNECOLOGY

## 2017-12-12 PROCEDURE — 25000128 H RX IP 250 OP 636: Performed by: OBSTETRICS & GYNECOLOGY

## 2017-12-12 RX ORDER — HEPARIN SODIUM (PORCINE) LOCK FLUSH IV SOLN 100 UNIT/ML 100 UNIT/ML
5 SOLUTION INTRAVENOUS
Status: COMPLETED | OUTPATIENT
Start: 2017-12-12 | End: 2017-12-12

## 2017-12-12 RX ADMIN — SODIUM CHLORIDE, PRESERVATIVE FREE 5 ML: 5 INJECTION INTRAVENOUS at 08:58

## 2017-12-12 NOTE — NURSING NOTE
"Chief Complaint   Patient presents with     Lab Only     labs drawn from port by rn.     Port accessed with 20 gauge 3/4\" Power needle and labs drawn by rn.  Port flushed with NS and heparin.  Pt tolerated well.    Peg Jauregui RN      "

## 2017-12-14 ENCOUNTER — INFUSION THERAPY VISIT (OUTPATIENT)
Dept: ONCOLOGY | Facility: CLINIC | Age: 57
End: 2017-12-14
Attending: OBSTETRICS & GYNECOLOGY
Payer: COMMERCIAL

## 2017-12-14 ENCOUNTER — CARE COORDINATION (OUTPATIENT)
Dept: ONCOLOGY | Facility: CLINIC | Age: 57
End: 2017-12-14

## 2017-12-14 VITALS
WEIGHT: 143.6 LBS | BODY MASS INDEX: 23.93 KG/M2 | TEMPERATURE: 98.3 F | HEART RATE: 104 BPM | RESPIRATION RATE: 17 BRPM | OXYGEN SATURATION: 98 % | DIASTOLIC BLOOD PRESSURE: 79 MMHG | HEIGHT: 65 IN | SYSTOLIC BLOOD PRESSURE: 112 MMHG

## 2017-12-14 DIAGNOSIS — D70.2 DRUG-INDUCED NEUTROPENIA (H): ICD-10-CM

## 2017-12-14 DIAGNOSIS — C56.2 OVARIAN CANCER, LEFT (H): ICD-10-CM

## 2017-12-14 DIAGNOSIS — Z79.899 ENCOUNTER FOR LONG-TERM (CURRENT) USE OF MEDICATIONS: ICD-10-CM

## 2017-12-14 DIAGNOSIS — C56.1 OVARIAN CANCER, RIGHT (H): Primary | ICD-10-CM

## 2017-12-14 DIAGNOSIS — C56.1 OVARIAN CANCER, RIGHT (H): ICD-10-CM

## 2017-12-14 LAB — ALBUMIN UR-MCNC: NEGATIVE MG/DL

## 2017-12-14 PROCEDURE — 96367 TX/PROPH/DG ADDL SEQ IV INF: CPT

## 2017-12-14 PROCEDURE — 99214 OFFICE O/P EST MOD 30 MIN: CPT | Mod: ZP | Performed by: OBSTETRICS & GYNECOLOGY

## 2017-12-14 PROCEDURE — 25000128 H RX IP 250 OP 636: Mod: ZF | Performed by: OBSTETRICS & GYNECOLOGY

## 2017-12-14 PROCEDURE — 96417 CHEMO IV INFUS EACH ADDL SEQ: CPT

## 2017-12-14 PROCEDURE — 96413 CHEMO IV INFUSION 1 HR: CPT

## 2017-12-14 PROCEDURE — 99212 OFFICE O/P EST SF 10 MIN: CPT | Mod: 25

## 2017-12-14 PROCEDURE — 96375 TX/PRO/DX INJ NEW DRUG ADDON: CPT

## 2017-12-14 PROCEDURE — 81003 URINALYSIS AUTO W/O SCOPE: CPT | Performed by: OBSTETRICS & GYNECOLOGY

## 2017-12-14 RX ORDER — DIPHENHYDRAMINE HYDROCHLORIDE 50 MG/ML
50 INJECTION INTRAMUSCULAR; INTRAVENOUS
Status: CANCELLED
Start: 2017-12-15

## 2017-12-14 RX ORDER — ALBUTEROL SULFATE 90 UG/1
1-2 AEROSOL, METERED RESPIRATORY (INHALATION)
Status: CANCELLED
Start: 2017-12-15

## 2017-12-14 RX ORDER — EPINEPHRINE 0.3 MG/.3ML
0.3 INJECTION SUBCUTANEOUS EVERY 5 MIN PRN
Status: CANCELLED | OUTPATIENT
Start: 2017-12-15

## 2017-12-14 RX ORDER — METHYLPREDNISOLONE SODIUM SUCCINATE 125 MG/2ML
125 INJECTION, POWDER, LYOPHILIZED, FOR SOLUTION INTRAMUSCULAR; INTRAVENOUS
Status: CANCELLED
Start: 2017-12-15

## 2017-12-14 RX ORDER — SODIUM CHLORIDE 9 MG/ML
1000 INJECTION, SOLUTION INTRAVENOUS CONTINUOUS PRN
Status: CANCELLED
Start: 2017-12-28

## 2017-12-14 RX ORDER — EPINEPHRINE 1 MG/ML
0.3 INJECTION, SOLUTION, CONCENTRATE INTRAVENOUS EVERY 5 MIN PRN
Status: CANCELLED | OUTPATIENT
Start: 2017-12-15

## 2017-12-14 RX ORDER — SODIUM CHLORIDE 9 MG/ML
1000 INJECTION, SOLUTION INTRAVENOUS CONTINUOUS PRN
Status: CANCELLED
Start: 2017-12-15

## 2017-12-14 RX ORDER — EPINEPHRINE 0.3 MG/.3ML
0.3 INJECTION SUBCUTANEOUS EVERY 5 MIN PRN
Status: CANCELLED | OUTPATIENT
Start: 2017-12-28

## 2017-12-14 RX ORDER — HEPARIN SODIUM (PORCINE) LOCK FLUSH IV SOLN 100 UNIT/ML 100 UNIT/ML
500 SOLUTION INTRAVENOUS EVERY 8 HOURS
Status: CANCELLED | OUTPATIENT
Start: 2017-12-15

## 2017-12-14 RX ORDER — MAGNESIUM SULFATE HEPTAHYDRATE 40 MG/ML
2 INJECTION, SOLUTION INTRAVENOUS ONCE
Status: COMPLETED | OUTPATIENT
Start: 2017-12-14 | End: 2017-12-14

## 2017-12-14 RX ORDER — MEPERIDINE HYDROCHLORIDE 25 MG/ML
25 INJECTION INTRAMUSCULAR; INTRAVENOUS; SUBCUTANEOUS EVERY 30 MIN PRN
Status: CANCELLED | OUTPATIENT
Start: 2017-12-28

## 2017-12-14 RX ORDER — ALBUTEROL SULFATE 90 UG/1
1-2 AEROSOL, METERED RESPIRATORY (INHALATION)
Status: CANCELLED
Start: 2017-12-28

## 2017-12-14 RX ORDER — HEPARIN SODIUM (PORCINE) LOCK FLUSH IV SOLN 100 UNIT/ML 100 UNIT/ML
500 SOLUTION INTRAVENOUS EVERY 8 HOURS
Status: DISCONTINUED | OUTPATIENT
Start: 2017-12-14 | End: 2017-12-14 | Stop reason: HOSPADM

## 2017-12-14 RX ORDER — METHYLPREDNISOLONE SODIUM SUCCINATE 125 MG/2ML
125 INJECTION, POWDER, LYOPHILIZED, FOR SOLUTION INTRAMUSCULAR; INTRAVENOUS
Status: CANCELLED
Start: 2017-12-28

## 2017-12-14 RX ORDER — EPINEPHRINE 1 MG/ML
0.3 INJECTION, SOLUTION, CONCENTRATE INTRAVENOUS EVERY 5 MIN PRN
Status: CANCELLED | OUTPATIENT
Start: 2017-12-28

## 2017-12-14 RX ORDER — LORAZEPAM 2 MG/ML
1 INJECTION INTRAMUSCULAR EVERY 6 HOURS PRN
Status: CANCELLED
Start: 2017-12-28

## 2017-12-14 RX ORDER — DIPHENHYDRAMINE HYDROCHLORIDE 50 MG/ML
50 INJECTION INTRAMUSCULAR; INTRAVENOUS
Status: CANCELLED
Start: 2017-12-28

## 2017-12-14 RX ORDER — EPINEPHRINE 0.3 MG/.3ML
INJECTION SUBCUTANEOUS
Status: DISCONTINUED
Start: 2017-12-14 | End: 2017-12-14 | Stop reason: WASHOUT

## 2017-12-14 RX ORDER — ALBUTEROL SULFATE 0.83 MG/ML
2.5 SOLUTION RESPIRATORY (INHALATION)
Status: CANCELLED | OUTPATIENT
Start: 2017-12-15

## 2017-12-14 RX ORDER — LORAZEPAM 2 MG/ML
1 INJECTION INTRAMUSCULAR EVERY 6 HOURS PRN
Status: CANCELLED
Start: 2017-12-15

## 2017-12-14 RX ORDER — ALBUTEROL SULFATE 0.83 MG/ML
2.5 SOLUTION RESPIRATORY (INHALATION)
Status: CANCELLED | OUTPATIENT
Start: 2017-12-28

## 2017-12-14 RX ORDER — MEPERIDINE HYDROCHLORIDE 25 MG/ML
25 INJECTION INTRAMUSCULAR; INTRAVENOUS; SUBCUTANEOUS EVERY 30 MIN PRN
Status: CANCELLED | OUTPATIENT
Start: 2017-12-15

## 2017-12-14 RX ADMIN — MAGNESIUM SULFATE IN WATER 2 G: 40 INJECTION, SOLUTION INTRAVENOUS at 14:24

## 2017-12-14 RX ADMIN — SODIUM CHLORIDE, PRESERVATIVE FREE 500 UNITS: 5 INJECTION INTRAVENOUS at 15:39

## 2017-12-14 RX ADMIN — CARBOPLATIN 545 MG: 10 INJECTION, SOLUTION INTRAVENOUS at 14:18

## 2017-12-14 RX ADMIN — DOXORUBICIN HYDROCHLORIDE 50 MG: 2 INJECTABLE, LIPOSOMAL INTRAVENOUS at 13:10

## 2017-12-14 RX ADMIN — DEXAMETHASONE SODIUM PHOSPHATE: 10 INJECTION, SOLUTION INTRAMUSCULAR; INTRAVENOUS at 12:39

## 2017-12-14 RX ADMIN — SODIUM CHLORIDE 1000 ML: 9 INJECTION, SOLUTION INTRAVENOUS at 14:18

## 2017-12-14 RX ADMIN — DEXTROSE MONOHYDRATE 250 ML: 5 INJECTION, SOLUTION INTRAVENOUS at 12:39

## 2017-12-14 ASSESSMENT — ENCOUNTER SYMPTOMS
EYE PAIN: 0
HEMATURIA: 1
HYPOTENSION: 0
LIGHT-HEADEDNESS: 0
CLAUDICATION: 0
DISTURBANCES IN COORDINATION: 0
COUGH: 0
PALPITATIONS: 0
LOSS OF CONSCIOUSNESS: 0
TASTE DISTURBANCE: 0
HEARTBURN: 0
SORE THROAT: 0
STIFFNESS: 0
MEMORY LOSS: 0
BREAST PAIN: 0
LEG PAIN: 0
SPEECH CHANGE: 0
WEAKNESS: 0
DECREASED LIBIDO: 0
ORTHOPNEA: 0
DIZZINESS: 0
HALLUCINATIONS: 0
BACK PAIN: 0
BRUISES/BLEEDS EASILY: 0
BLOOD IN STOOL: 0
SHORTNESS OF BREATH: 0
TROUBLE SWALLOWING: 0
WEIGHT GAIN: 0
ARTHRALGIAS: 0
FLANK PAIN: 0
NIGHT SWEATS: 0
SINUS CONGESTION: 0
NAUSEA: 0
SMELL DISTURBANCE: 0
SEIZURES: 0
MUSCLE WEAKNESS: 0
NAIL CHANGES: 0
JOINT SWELLING: 0
BREAST MASS: 0
TINGLING: 0
RESPIRATORY PAIN: 0
EYE REDNESS: 0
DYSPNEA ON EXERTION: 0
SWOLLEN GLANDS: 0
MYALGIAS: 0
POSTURAL DYSPNEA: 0
VOMITING: 0
RECTAL PAIN: 0
DEPRESSION: 0
DIFFICULTY URINATING: 0
BOWEL INCONTINENCE: 0
FEVER: 0
EYE IRRITATION: 0
DOUBLE VISION: 0
INCREASED ENERGY: 0
DYSURIA: 1
ABDOMINAL PAIN: 0
EXTREMITY NUMBNESS: 0
POLYDIPSIA: 0
SPUTUM PRODUCTION: 0
HOARSE VOICE: 0
DECREASED CONCENTRATION: 0
SKIN CHANGES: 0
COUGH DISTURBING SLEEP: 0
LEG SWELLING: 0
PARALYSIS: 0
INSOMNIA: 1
EYE WATERING: 0
WHEEZING: 0
WEIGHT LOSS: 0
JAUNDICE: 0
HEMOPTYSIS: 0
NECK MASS: 0
MUSCLE CRAMPS: 0
NUMBNESS: 0
BLOATING: 0
ALTERED TEMPERATURE REGULATION: 0
SLEEP DISTURBANCES DUE TO BREATHING: 0
DECREASED APPETITE: 0
SINUS PAIN: 0
NECK PAIN: 0
SNORES LOUDLY: 0
FATIGUE: 1
POOR WOUND HEALING: 0
CHILLS: 0
CONSTIPATION: 0
HOT FLASHES: 0
DIARRHEA: 1
RECTAL BLEEDING: 0
NERVOUS/ANXIOUS: 1
HYPERTENSION: 0
TACHYCARDIA: 0
POLYPHAGIA: 0
EXERCISE INTOLERANCE: 0
PANIC: 0
TREMORS: 0
SYNCOPE: 0
HEADACHES: 0

## 2017-12-14 ASSESSMENT — PAIN SCALES - GENERAL: PAINLEVEL: NO PAIN (0)

## 2017-12-14 NOTE — PROGRESS NOTES
Infusion Nursing Note:  Odalys Nathan presents today for cycle 4, day 1 doxil, carboplatin (dose #15), avastin.    Patient seen by provider today: Yes: Dr Yeung   present during visit today: Not Applicable.    Note:   Avastin was added to treatment plan today but not covered by patient's insurance so she did not receive. Pharmacy is sending a prior authorization to get it approved    Intravenous Access:  Implanted Port.    Treatment Conditions:  Lab Results   Component Value Date    HGB 10.4 12/12/2017     Lab Results   Component Value Date    WBC 4.8 12/12/2017      Lab Results   Component Value Date    ANEU 2.5 12/12/2017     Lab Results   Component Value Date     12/12/2017      Lab Results   Component Value Date     12/12/2017                   Lab Results   Component Value Date    POTASSIUM 3.5 12/12/2017           Lab Results   Component Value Date    MAG 1.3 12/12/2017            Lab Results   Component Value Date    CR 0.74 12/12/2017                   Lab Results   Component Value Date    JOSE ALBERTO 8.7 12/12/2017                Lab Results   Component Value Date    BILITOTAL 0.4 12/12/2017           Lab Results   Component Value Date    ALBUMIN 3.4 12/12/2017                    Lab Results   Component Value Date    ALT 26 12/12/2017           Lab Results   Component Value Date    AST 22 12/12/2017     Results reviewed, labs MET treatment parameters, ok to proceed with treatment.  Urine negative for protein. /79  Magnesium replaced per protocol for Magnesium 1.3 on 12/12/17      Post Infusion Assessment:  Patient tolerated infusion without incident.  Patient observed for 30 minutes post carboplatin per protocol.  Blood return noted pre and post infusion.  Site patent and intact, free from redness, edema or discomfort.  No evidence of extravasations.  Access discontinued per protocol.    Discharge Plan:   Patient declined prescription refills.  Discharge instructions reviewed  with: Patient.  Patient and/or family verbalized understanding of discharge instructions and all questions answered.  Copy of AVS reviewed with patient and/or family.  Patient will go to [facility] 12/28 for next appointment.  Patient discharged in stable condition accompanied by: self and .  Departure Mode: Ambulatory.  Face to Face time: 0.    Elly Chavez RN

## 2017-12-14 NOTE — MR AVS SNAPSHOT
After Visit Summary   12/14/2017    Odalys Nathan    MRN: [id number]           Patient Information     Date Of Birth          1960        Visit Information        Provider Department      12/14/2017 10:40 AM Nga Yeung MD [facility]        Today's Diagnoses     Drug-induced neutropenia (H)        Encounter for long-term (current) use of medications        Ovarian cancer, right (H)        Ovarian cancer, left (H)           Follow-ups after your visit        Follow-up notes from your care team     Return in about 4 weeks (around 1/12/2018).      Your next 10 appointments already scheduled     Jan 26, 2018  9:00 AM CST   Level 2 with RH INFUSION CHAIR 3   [facility] ([facility])    [facility]  [address]  [place] [address]   [phone]            Feb 09, 2018 10:30 AM CST   [facility] Draw with [facility] LAB DRAW   [facility] ([facility])    [address] [place] [address]  [address]  [facility] [address]   [phone]            Feb 09, 2018 11:00 AM CST   (Arrive by 10:45 AM)   Return Active Treatment with HALIE De Paz CNP   [facility] ([facility])    [address] [place] [address]  [address]  [facility] [address]   [phone]            Feb 09, 2018 12:00 PM CST   Infusion 240 with UC ONCOLOGY INFUSION, UC 15 ATC   [facility] ([facility])    [address] [place] [address]  [address]  [facility] [address]   [phone]            Feb 23, 2018  8:30 AM CST   [facility] Lab Draw with [facility] LAB DRAW   [facility] ([facility])    [address] [place] [address]  [address]  [facility] [address]   [phone]            Feb 23, 2018  9:00 AM CST   Infusion 60 with UC ONCOLOGY INFUSION, UC 29 ATC   [facility]  "Cancer Clinic ([facility])    [address]  [address]  [facility] [address] [phone]              Who to contact     If you have questions or need follow up information about today's clinic visit or your schedule please contact [facility] directly at [phone].  Normal or non-critical lab and imaging results will be communicated to you by MyChart, letter or phone within 4 business days after the clinic has received the results. If you do not hear from us within 7 days, please contact the clinic through [employer] or phone. If you have a critical or abnormal lab result, we will notify you by phone as soon as possible.  Submit refill requests through [employer] or call your pharmacy and they will forward the refill request to us. Please allow 3 business days for your refill to be completed.          Additional Information About Your Visit        [employer] Information     [employer] gives you secure access to your electronic health record. If you see a primary care provider, you can also send messages to your care team and make appointments. If you have questions, please call your primary care clinic.  If you do not have a primary care provider, please call [phone] and they will assist you.        Care EveryWhere ID     This is your Care EveryWhere ID. This could be used by other organizations to access your [place] medical records  [id number]        Your Vitals Were     Pulse Temperature Respirations Height Pulse Oximetry Breastfeeding?    104 98.3  F (36.8  C) (Oral) 17 1.651 m (5' 5\") 98% No    BMI (Body Mass Index)                   23.9 kg/m2            Blood Pressure from Last 3 Encounters:   01/12/18 121/57   12/28/17 125/58   12/14/17 112/79    Weight from Last 3 Encounters:   01/12/18 64.4 kg (142 lb)   12/14/17 65.1 kg (143 lb 9.6 oz)   11/17/17 64 kg (141 lb 1.6 oz)              Today, you had the following     No orders found for display       "   Today's Medication Changes          These changes are accurate as of: 12/14/17 11:59 PM.  If you have any questions, ask your nurse or doctor.               These medicines have changed or have updated prescriptions.        Dose/Directions    magnesium oxide 400 MG tablet   Commonly known as:  MAG-OX   This may have changed:  when to take this   Used for:  Ovarian cancer, unspecified laterality (H)        Dose:  400 mg   Take 1 tablet (400 mg) by mouth 2 times daily   Quantity:  90 tablet   Refills:  3                Primary Care Provider Office Phone # Fax #    Aubrie Hester [phone]       [facility] [address] [facility] [address]        Equal Access to Services     SANDY CHAMBERS : Hadii bj Walter, wajeovanny mukherjee, qamima kaalmada bel, blanka szymanski. So [facility] [phone].    ATENCIÓN: Si habla español, tiene a bell disposición servicios gratuitos de asistencia lingüística. Ll[facility] [phone].    We comply with applicable federal civil rights laws and [place] laws. We do not discriminate on the basis of race, color, national origin, age, disability, sex, sexual orientation, or gender identity.            Thank you!     Thank you for choosing [facility]  for your care. Our goal is always to provide you with excellent care. Hearing back from our patients is one way we can continue to improve our services. Please take a few minutes to complete the written survey that you may receive in the mail after your visit with us. Thank you!             Your Updated Medication List - Protect others around you: Learn how to safely use, store and throw away your medicines at www.disposemymeds.org.          This list is accurate as of: 12/14/17 11:59 PM.  Always use your most recent med list.                   Brand Name Dispense Instructions for use Diagnosis    ASPIRIN PO      Take 325 mg by mouth daily         CALCIUM + D PO      Take 1 tablet by mouth daily.    Pelvic mass       cyanocobalamin 1000 MCG/ML injection    VITAMIN B12    1 mL    Inject 1 mL (1,000 mcg) into the muscle every 30 days    B12 deficiency       diphenoxylate-atropine 2.5-0.025 MG per tablet    LOMOTIL    60 tablet    Take 2 tablets by mouth 4 times daily as needed for diarrhea    Acute diarrhea       Ferrous Sulfate 324 (65 FE) MG Tbec     90 tablet    TAKE 1 TABLET BY MOUTH 3 TIMES DAILY WITH MEALS. TAKE WITH A SMALL AMOUNT OF ORANGE JUICE. DO NOT TAKE WITH CALCIUM.    Ovarian cancer, right (H), Anemia, unspecified type, Ovarian cancer, left (H)       HERBALS      daily        iohexol 140 MG/ML Soln solution    OMNIPAQUE    140 mL    Mix entire bottle (50ml) of contast with 600ml (20 ounces) of water and drink half 2 hrs prior to CT scan and half 1 hr prior to scan    Ovarian cancer, right (H), Metastatic cancer (H)       LEVOTHYROXINE SODIUM PO      Take by mouth daily        LORazepam 1 MG tablet    ATIVAN    30 tablet    Take 1 tablet (1 mg) by mouth every 6 hours as needed (Anxiety, Nausea/Vomiting or Sleep)    Ovarian cancer, unspecified laterality (H)       magnesium oxide 400 MG tablet    MAG-OX    90 tablet    Take 1 tablet (400 mg) by mouth 2 times daily    Ovarian cancer, unspecified laterality (H)       METFORMIN HCL PO      Take 500 mg by mouth daily        order for DME     3 each    Injection Supplies for Vitamin B12: 3cc syringes w/ 27 gauge needles, 1/2 inch length    B12 deficiency       potassium chloride 20 MEQ Packet    KLOR-CON     Take 20 mEq by mouth daily        prochlorperazine 10 MG tablet    COMPAZINE    30 tablet    Take 1 tablet (10 mg) by mouth every 6 hours as needed (nausea/vomiting)    Encounter for long-term (current) use of medications, Ovarian cancer, right (H), Ovarian cancer, left (H), Drug-induced neutropenia (H)       sulfamethoxazole-trimethoprim 800-160 MG per tablet    BACTRIM DS/SEPTRA DS    14 tablet     Take 1 tablet by mouth 2 times daily    Dysuria       VITAMIN C PO      Take 500 mg by mouth daily    Pelvic mass       VITAMIN D PO      Take 1,000 Units by mouth daily Unknown dose        VITAMIN E NATURAL PO      Take 100 Units by mouth daily

## 2017-12-14 NOTE — LETTER
2017     RE: Odalys Nathan  [id number]  [facility] [id number]     Dear Colleague,    Thank you for referring your patient, Odalys Nathan, to the [facility]. Please see a copy of my visit note below.    Gynecologic Oncology Follow-Up Note  RE: Odalys Nathan  MRN: [id number]  : 1960  Date of Visit: 2017    CC: Odalys Nathan is a 57 year old year old female with recurrent stage IIIC bilateral ovarian cancer who presents today for disease management. S/p 3 cycles doxil/carbo,  83.     HPI: Odalys comes to the clinic accompanied by her  Marcos.  is stable from November at 83. Imaging reveals some disease response and some growth. Pt is feeling well health-wise with chemo. She continues to be fatigued, but no nausea or vomiting. No fever or chills. No bowel or urinary issues. No chest pain or SOB. No abdominal pain or distention. She is eating and drinking well. No neuropathy.     Brief Oncology History:  2012 - Admitted to hospital for 2 weeks of intermittent abdominal cramping, distention, diarrhea and N/V. CT of abdomen/pelvis significant for small bowel obstruction, a heterogenous soft tissue density in the pelvis, omental nodules, and ascites. Bilateral adnexal masses per U/S of pelvis. CA-125 was elevated at 987, CEA was normal at 1.0.    12 - Therapeutic paracentesis (4 L) with cytology confirming malignancy (TX positive, weak ER positive, CK-7 positive consistent with GYN primary). Surgery recommended d/t potential for falling blood counts 2/2 chemotherapy (patient is Faith and limiting blood transfusion)    12 - Exploratory laparotomy, MAR BSO, lysis of adhesion, Appendectomy, Repair cystotomy, Omentectomy Swyi-kqovlb-yaqwetjbc-transverse-colon resection, Ileo-descending colon anastomosis, CUSA, & Colonoscopy (done by Dr. Amato and colorectal team).  2012 - Admitted to hospital for bilateral  pulmonary emboli and drainage of pleural effusion. Started on Lovenox.  2/28/12-3/21/12: Cycle #1-2 Carbo/Taxol IV  3/29/12 - Started on Keflex by her PCP for infection in her healing wound (immediately below her umbilicus).    4/11/12-6/14/12: Cycle #3-6 IV chemo, Cycle #1 IV/IP chemo  7/16/12 -  8, CT PRADEEP - enrolled in [facility] [place] [facility] [place] arm  3/4/13-6/28/13:  6, 9, 12, 15, 20.  7/1/13: CT Chest, Abdomen, Pelvis IMPRESSION:  1. Worsening metastatic ovarian carcinoma suggested by increased size of soft tissue nodules anterior to the right psoas muscle, that may represent growing mesenteric lymphadenopathy.  2. Remaining prominent right lower quadrant mesenteric lymph nodes are not significantly changed from CT 7/16/2012.  3. Clustered nodular opacities in the right lower lobe are not significant change from 7/16/2012 and remain indeterminate. Again, the appearance and distribution is suggestive of an infectious etiology.  4. Stable 8 mm soft tissue nodule in left breast, unchanged since at least 02/20/2012. This can be observed on followup studies, but correlation with mammography could be considered.  Decision made to start Doxil/Carbo.  7/11/13: The left ventricular ejection fraction is normal at 66.4%.  7/12/13-9/6/13: Cycle #1-3 Doxil/Carbo.  10, 11.    9/30/13 CT C/A/P Impression:  1. Overall, favorable response to treatment with decreasing size of soft tissue nodules tracking along the anterior aspect of the right psoas muscle.  2. 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  3. Improved cluster of right lower lobe pulmonary nodular opacities. These may represent resolving infection.  10/3/13-12/9/13:  9, 8, 10. Cycle 4-6 Doxil/Carbo.    1/13/14 CT C/A/P Impression:   1. Stable appearance of metastatic ovarian cancer. Scattered soft tissue nodules along the anterior aspect of the right psoas muscle are unchanged in size. Mild mesenteric lymphadenopathy is unchanged.  2. Clustered  micronodules in the right lower lobe are unchanged from 9/30/2013, but improved from 7/1/2013. This history suggests a postinflammatory/postinfectious etiology.  3. 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  1/16/14 Discussed multiple options for her based on relatively stable-appearing disease on CT but slight increase in  (which has had small increase to 20 with last recurrence) including chemo break with recheck of  in 1 month, starting new chemo agent immediately, and exploratory surgery with possible resection of nodules. She is considered platinum-sensitive based on > 1 year remission after Taxol/Carbo, which we will take into consideration for future chemo planning. I am not inclined to surgery at this time given difficulty she already has with diarrhea secondary to past colon resection. I suspect we would need to resect further bowel due to mesenteric disease. Also explained inherent risks of any major surgery. Also mentioned maintenance chemo, but this has not been shown to increase overall survival and would likely decrease her quality of life without significant benefit. Family is going on vacation to [place] in 2 weeks and Odalys does not want to have chemo prior to that, so will plan to take 1 month break. She can have  at that time (discussed checking toady since last draw was in early December, but as it would likely not change treatment plan and she has h/o slow rising , will not check today).  2/17/14  16    2/20/14: Decision to take break from chemo for two months, followed by CT and CA-125.    4/21/14  27  4/21/14 CT C/A/P Impression:    1. Increased size of 2 low-attenuation lymph nodes anterior to the right psoas muscle is concerning for worsening metastatic ovarian cancer.    2. New circumferential thickening of a 3.8 cm length segment of distal transverse colon is likely physiologic. Recommend attention on followup imaging.    3. Grossly unchanged size  "of clustered small nodules versus scarring in the right lower lobe the lungs.    4. Stable thrombosis of the right ovarian vein.  5/14/14: Diagnostic laparoscopy converted to exploratory laparotomy and removal of mesenteric masses, tumor debulking, peritoneal biopsies and intraperitoneal port placement. On laparoscopy, it was noted that there were small nodules on the anterior abdominal wall near the previous incision, small nodules on the right pelvic sidewall as well. Nodules were palpated in the mesentery; however, as it was unable to clarify where the origin of the nodules was, the decision was made to open the patient. On opening there was found to be approximately a 3 cm nodule in the small bowel mesentery and another separate approximately 2 cm nodule in the bowel mesentery. Pelvis without evidence of cancer, some mesenteric lymph nodes were palpated. No evidence otherwise of any disseminated cancer throughout the abdomen.    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 (2/2)  -Largest metastasis: 1.3 cm  -See comment  D: Peritoneum, right paracolic gutter #2, biopsy:  -Fibroadipose tissue with granulomatous inflammation surrounding refractile material  -Negative for malignancy   E: Small bowel adhesion, biopsy:  -Fibroconnective tissue, consistent with adhesion  -Negative for malignancy  F: Lymph nodes, mesentry, not otherwise specified, excision:  -Two lymph nodes, negative for metastatic carcinoma (0/2)  G: Lymph node, mesentery, \"#2\", excision:  -One lymph node, negative for metastatic carcinoma (0/1)  H: Lymph nodes, mesentery, \"nodule #2\", excision:  -Five " lymph nodes, negative for metastatic carcinoma (0/5)  COMMENT:  Some of the specimens show post-operative changes. Others show possible treatment related changes, including necrosis. The metastatic carcinoma in the mesenteric lymph nodes (specimen C) shows variable morphology, including relatively low grade tumor with papillary architecture, and high grade tumor comprised of nests of tumor cells with irregular, slit-like spaces and marked nuclear pleomorphism.    5/29/14: Cycle 1 IV PACLitaxel / IP CISplatin / IP PACLitaxel.  - 28.  6/26/14: Cycle #2 IV/IP.  10  8/5/14: CT chest/abd/pelvis IMPRESSION     1. In this patient with ovarian cancer, overall findings are indicative of stable/slight improvement, as multiple mesenteric lymphadenopathy and scattered nodular peritoneal soft tissue mass lesions appear unchanged or slightly smaller since 4/21/2014.    2. 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 Mild dilatation of the second and the third portion of the duodenum with a narrow SMA angle. This could represent SMA syndrome, if clinically correlated.17/14:    Cycle #3 IV/IP.  10.    CT chest/abd/pelvis with contrast on 8/5/14    Impression:    1. In this patient with ovarian cancer, overall findings are indicative of stable/slight improvement, as multiple mesenteric lymphadenopathy and scattered nodular peritoneal soft tissue mass lesions appear unchanged or slightly smaller since 4/21/2014.    2. 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 Mild dilatation of the second and the third portion of the duodenum with a narrow SMA angle. This could represent SMA syndrome, if clinically correlated.  8/7/14: Cycle #4 Taxol/Carbo (changed from IV/IP).  10. She has been feeling okay. She is unsure if she can finish out the course of 6 cycles IV/IP taxol/cisplatin. She feels like she has the flu for about a week then starts feeling gradually better after each chemo  cycle. Her spouse notes that she actually has been more sick with the treatments than she initially admits here. She was also previously having some rib pain. Denies any rib pain now. Denies any chest pain or shortness of breath.  Plan: discussed recent CT cap results and switching to just IV as she is feeling miserable with IP treatments. Switch to IV carbo/taxol as patient is platinum sensitive.  We also discussed her taking part of the tesaro trial, which would require BRCA testing. She would like to take part in this trial if eligible.  8/20/14: Remove Intraperitoneal Port ( Port and catheter intact - discarded)  8/28/14: Cycle #5 Taxol/Carbo held due to thrombocytopenia.  6.    She denies any vaginal bleeding, no changes in her bowel or bladder habits, no nausea/emesis, no lower extremity edema, and no difficulties eating or sleeping. She denies any abdominal discomfort/bloating, no fevers or chills, and no chest pain or shortness of breath. She states her diarrhea is the same. She reports some fatigue which improves about 1-2 weeks after her chemotherapy. She states she does not need any medication refills and she was told she does not meet the criteria for the TESARO trial. She states she has 3 bags of iv fluids left over from her previous chemotherapy and will give these to herself. She states she is ready for her treatment today.    9/29/14: Cycle #6 Taxol/Carbo  6. Insurance questions regarding GSF coverage today. No concerns other than fatigue. Taking iron for anemia and does not desire blood transfusion. Using neulasta for neutropenia. Using home IV hydration if needed. Baseline unchanged. No abdominal bloating, constipation, diarrhea, pain, vaginal or rectal bleeding, cough or dyspnea, fluid retention.    10/16/14: Impression:    1. Nodular peritoneal soft tissue mass in the right lower quadrant adjacent to the psoas muscle is no longer appreciated. Adjacent prominent lymphadenopathy is  unchanged from previous exam. No new peritoneal lesions.    2. 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    10/20/14:  5. CT chest/abdomen/pelvis on 10/16/14 showed nodular peritoneal soft tissue mass in the right lower quadrant adjacent to the psoas muscle is no longer appreciated. Adjacent prominent lymphadenopathy is unchanged from previous exam. 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  1/27/15:  6.  4/28/15:  14.  5/26/15:  18.  6/2/15: CT cap Impression:  1. Postsurgical changes of hysterectomy and bilateral salpingo-oophorectomy for ovarian cancer. There is a new 8 mm hazy, ill-defined hypoattenuating lesion in hepatic segment 6 which is suspicious for a metastatic deposit. Further evaluation with ultrasound in recommended.    2. Increased size of a left retroperitoneal lymph node which is indeterminate but may represent a ciro metastasis. Mildly prominent lymph nodes in the right lower quadrant are not significantly changed.  3. Moderate colonic stool burden.    6/4/15: US abdomen IMPRESSION:    Hyperechoic lesion in the right hepatic lobe, consistent with hemangioma. This does not corresponds to the area of the lesion seen on CT from 6/2/2015. An MR would be helpful for identifying and characterizing the lesion from the recent CT.  6/12/15: MR abd IMPRESSION:  1. New 20 x 11 mm enhancing lesions between the right obliques, concerning for metastatic disease. This lesions should be amenable to percutaneous biopsy, if indicated.  2. Correlating to the lesion visualized on comparison CT is a hepatic segment 6 subcapsular 7 mm lesion. Overall the appearance favors the diagnosis of a simple cyst. However, there is faint suggestion of mild peripheral arterial enhancement. Although this is favored as  artifactual, this should be followed up to confirm stability. Recommend 6 month followup.  3. Hepatic segment 6, 5 mm lesion too  small to technically characterize. Differential would favor FNH, less likely flash filling hemangioma. Recommend attention on followup.  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     9/1/15: Cycle #1 Avastin/Cytoxan.   31. /62    9/24/15:feeling generally well. She says she has been having back and stomach spasms. She is taking cytosine daily (she ran out yesterday). She also says its affecting her voice. Admits that it burns occasionally. She is eating and drinking normal. She also admit diarrhea, 5-7 times daily, lose/watery. She trying to stay hydrated and eat fiber. She also says that her body is sore, especially the bottom of her feet. Her blood pressure is normal. She also admits having headache after her first infusion.      9/24/15: Cycle #2 Avastin/Cytoxan.  19. /75  10/15/15: Cycle #3 Avastin/Cytoxan.  16. /79    11/6/15: CT c/a/p IMPRESSION:    1. Stable postoperative change of MAR/BSO for ovarian cancer.  2. The lesion in the right flank abdominal musculature is slightly decreased in size. Otherwise, stable examination.  2. No evidence of metastatic disease in the chest.    11/20/15: Treatment planning visit,  16    11/25/15: surgical pathology report  FINAL DIAGNOSIS:  Soft tissue, right oblique muscle mass, excision:  -Recurrent ovarian serous carcinoma  -Carcinoma is present less than 1 mm from one resection margin  -Background skeletal muscle and fibroadipose tissue    1/4/16:  22  1/11/16-1/27/16: Radiation to right flank x 12 treatments  4/7/2016:  94. CT cap IMPRESSION:    In this patient with ovarian cancer status post MAR/BSO and descending/transverse colectomy:  1. No evidence for malignancy in the chest, abdomen, or pelvis.  2. Stable small hypodense segment 6 liver lesion, appears more likely benign,  possibly a cyst.  5/13/16:  124.  6/3/16: PET CT IMPRESSION: In this patient with a history of ovarian cancer:  1. Hypermetabolic and enlarging periaortic and perihepatic lymphadenopathy compatible with metastatic disease, as detailed above.  2. Although hypodense lesion in hepatic segment 6 has been present since 6/2/2015 associated hypermetabolism makes this lesion highly concerning for metastatic disease.    Plan: to start Niraparib under TESARO study.     6/9/16:  137.  6/14/16: Cycle #1 Niraparib.    6/28/16: Cycle #1 D15 Niraparib.    7/5/16:  100.    7/11/16: Cycle #2 Nraparib.   83.    8/3/2016: PET CT IMPRESSION:    In this patient with known history of ovarian cancer:  1) New pleural based nodular opacities in the lateral and inferior aspects of the bilateral lower lobes, worse in the left lung. Likely infection. Close follow up is recommended.      2) Slight decrease in hypermetabolic abdominal lymphadenopathy. 2 hypermetabolic lymph nodes persist.  3) Unchanged right hepatic lobe metastatic lesion.     8/9/16: Cycle #3 Niraparib.  69.  9/6/16: Cycle #4 Niraparib.  53. Dose held due to anemia.  9/13/16: Eval for potential cycle 4 niraparib. Dose held due to anemia.  10/4/16: CT CAP impression:  IMPRESSION: In this patient with a known history of ovarian cancer:  1. There has been interval resolution of pleural-based nodular  opacities which likely represented infection.  2. Abdominal lymphadenopathy in the form of 2 portacaval lymph nodes  have not significantly changed in size, noted to be hypermetabolic on  prior PET/CT.  3. Previously demonstrated metastatic lesion in the right lobe of the  liver is not significantly changed.  10/11/16: Cycle #5 Niraparib.  60  11/1/16: C6 niraparib,  75  11/29/16: C7 niraparib.  78. CT CAP impression as follows:  Target lesions (RECIST criteria):       A previously described target lesion superior to the head of  the  pancreas (series 2, image 64)  (referred to as a perihepatic node on  6/3/2016) may not be a valid target lesion because it measured less  than 1.5 cm originally. However, this particular node has decreased in  size, now measuring 7 mm in short axis versus 14 mm on 6/3/2016 when  measured in a similar fashion.       2.3 cm short axis portacaval lymph node on series 2 image 67,  previously 2.0 cm on 10/4/2016.       1.2 cm subtle hypodensity in hepatic segment 6 on series 2 image 75,  stable on multiple studies since at least 6/3/2016     Sum of diameters today: 3.5 cm. Sum of diameters 10/4/2016: 3.2 cm.  Growth = 9%.    12/27/16: C8 niraparib.  105.  1/25/17: C9 niraparib.  108.  2/23/17: C10 niraparib.  132.   CT CAP impression:  Sum of target lesion diameters today: 3.7 cm. Sum of target lesion  diameters on 11/28/2016: 3.5 cm. Growth= 6%  1. In this patient with history of ovarian cancer there is stable  disease by RECIST criteria as evidenced by:   1a. Mildly increased size of liver metastasis.  1b. Stable portacaval lymphadenopathy.  1c. No evidence of metastatic disease in the chest.  2. Trace emphysematous changes of the lungs.  3/22/17: C11 niraparib.  132.  4/19/17: C12 niraparib.  127.  5/16/17: CT CAP IMPRESSION: In this patient with ovarian cancer:  1. Mildly increased size of hepatic metastasis segment 6 with subtle increased capsular retraction.  2. Stable edmundo hepatis nodes, with mild increase in size of periaortic lymph nodes.  3. Prominent left supraclavicular lymph node with subtle increase in size compared to prior studies, particularly comparing to 10/4/2016.  4. Mild subtle groundglass opacities in the right upper lobe, not present on prior study, lesser extent in the right lower lobe.  Findings may represent infection, additional consideration is malignancy (less likely), and attention on follow-up study  Recommended.  Addendum:   Prominent left  supraclavicular lymph node (3/25) is stable from most recent CT performed 2/20/2017, currently measuring 14 x 16 mm, previously 14 x 16 mm on 2/20/2017.      The portal caval lymph node/edmundo hepatis lymph node is stable from 2/20/2017, measuring 21 mm.      Clarification of size of the para-aortic lymph node (series 3 image 327). It short axis measurement is 11 mm versus 9 mm on prior study.      Hepatic segment 6 triangular-shaped low density lesion (3/362) measures 14 mm, previously measured 12 mm, demonstrating a  possible/questionable minimal subtle increase in size. Similarly the lymph nodes noted above in the supraclavicular and para-aortic regions demonstrate possible minimal possible subtle increase in size.      5/18/17: Cycle #13 Niraparib.  191.  6/15/17: Cycle #14 Niraparib.  146.  7/13/17: Cycle #15 Niraparib 200 mg.  171     CT (8/9/17):       IMPRESSION:  1. Segment 6 hepatic metastasis is stable to minimally increased in  size.  2. Slight increase in multicentric adenopathy, most pronounced at the  edmundo hepatis. Additional sites include the inferior left  neck/supraclavicular region and retroperitoneum which appear stable to  minimally increased.      8/10/17:  175  9/14/17: MUGA LVEF 54%  9/22/17: C1D1 carboplatin/Doxil.  187.  10/19/17: C2D1 carboplatin/Doxil.  108.  11/17/17: C3D1 carboplatin/Doxil.  82.    12/12/17: CT CAP:  IMPRESSION: In this patient with history of ovarian cancer status post  surgery and currently undergoing chemotherapy there is evidence of  mixed response to chemotherapy:  1. Slightly increased size of a hepatic segment 6 metastasis,  suggesting disease progression.  2. Left supraclavicular, edmundo hepatis and retroperitoneal  lymphadenopathy is variably increased and decreased, as described in  the findings, compatible with mixed response.   Abdomen and pelvis:   Slightly increased ill-defined hypoenhancing hepatic segment  6  subcapsular lesion with associated capsular retraction, now measuring  2.5 x 2.2 x 2.5 cm, previously 1.9 x 1.9 x 2.1 cm.   Redemonstration of enlarged, heterogeneously enhancing edmundo hepatis  and retroperitoneal lymphadenopathy. Some of which have slightly  increased in size since 8/9/2017, for example 2.4 x 1.5 cm lymph node  along the posterior aspect of the right renal vein (series 3, image  323), previously 1.9 x 1.2 cm. While others are unchanged or have  slightly decreased in size    Past Medical History:   Diagnosis Date     Antiplatelet or antithrombotic long-term use      Ascites      Blood clot in the legs      Diabetes (H)      Ovarian cancer (H)     serous,stg IV     Pleural effusion      Pulmonary embolism (H) 2/2012     Refusal of blood transfusions as patient is Mu-ism      Short gut syndrome      Subclinical hypothyroidism 4/18/2013     Thrombosis of leg        Past Surgical History:   Procedure Laterality Date     COLECTOMY       COLONOSCOPY  2/1/2012    Procedure:COLONOSCOPY; With Biopsy; Surgeon:TONI SULLIVAN; Location:[facility] OR     HYSTERECTOMY TOTAL ABD, RHIANNA SALPINGO-OOPHORECTOMY, NODE DISSECTION, TUMOR DEBULKING, COMBINED  2/1/2012    Procedure:COMBINED HYSTERECTOMY TOTAL ABDOMINAL, BILATERAL SALPINGO-OOPHORECTOMY, NODE DISSECTION, TUMOR DEBULKING;  Exploratory Laparotomy, Total Abdominal Hysterectomy, Bilateral Salpingo-Oophorectomy, appendectomy,lysis of adhesions, ileal, ascending, transverse and splenic flexure resection, ileal descending bowel renanastomosis, incidental cystotomy repair, CUSA procedure and colonoscopy ; [facility]     INSERT PORT PERITONEAL ACCESS  4/3/2012    Procedure:INSERT PORT PERITONEAL ACCESS; Intraperitoneal Port Placement (c-arm); Surgeon:SAMUEL CARRASCO; Location:[facility] OR     INSERT PORT PERITONEAL ACCESS  5/14/2014    Procedure: INSERT PORT PERITONEAL ACCESS;  Surgeon: Nga Yeung MD;  Location: [facility] OR     INSERT PORT VASCULAR ACCESS        LAPAROSCOPY DIAGNOSTIC (GYN)  5/14/2014    Procedure: LAPAROSCOPY DIAGNOSTIC (GYN);  Surgeon: Nga Yeung MD;  Location: [facility] OR     LAPAROTOMY EXPLORATORY Right 11/25/2015    Procedure: LAPAROTOMY EXPLORATORY;  Surgeon: Nga Yeung MD;  Location: [facility]     LAPAROTOMY, TUMOR DEBULKING, COMBINED  5/14/2014    Procedure: COMBINED LAPAROTOMY, TUMOR DEBULKING;  Surgeon: Nga Yeung MD;  Location: [facility] OR     REMOVE CATHETER PERITONEAL N/A 8/20/2014    Procedure: REMOVE CATHETER PERITONEAL;  Surgeon: Nga Yeung MD;  Location: [facility] OR     VASCULAR SURGERY      stent left iliac vein       Current Outpatient Prescriptions   Medication     iohexol (OMNIPAQUE) 140 MG/ML SOLN solution     sulfamethoxazole-trimethoprim (BACTRIM DS/SEPTRA DS) 800-160 MG per tablet     Ferrous Sulfate 324 (65 FE) MG TBEC     LORazepam (ATIVAN) 1 MG tablet     prochlorperazine (COMPAZINE) 10 MG tablet     LEVOTHYROXINE SODIUM PO     order for DME     METFORMIN HCL PO     diphenoxylate-atropine (LOMOTIL) 2.5-0.025 MG per tablet     cyanocobalamin (VITAMIN B12) 1000 MCG/ML injection     magnesium oxide (MAG-OX) 400 MG tablet     ASPIRIN PO     potassium chloride (KLOR-CON) 20 MEQ packet     VITAMIN E NATURAL PO     Cholecalciferol (VITAMIN D PO)     HERBALS     Ascorbic Acid (VITAMIN C PO)     Calcium Carbonate-Vitamin D (CALCIUM + D PO)     No current facility-administered medications for this visit.      Facility-Administered Medications Ordered in Other Visits   Medication     sodium chloride (PF) 0.9% PF flush 20 mL     heparin 100 UNIT/ML injection 500 Units       No Known Allergies    Family History   Problem Relation Age of Onset     CANCER Mother 69     lung, smoker     CANCER Maternal Uncle 65     brain     Colon Cancer Maternal Aunt 80     colon       Social History     Social History     Marital status:      Spouse name: N/A     Number of children: N/A     Years of education: N/A     Occupational  History     Not on file.     Social History Main Topics     Smoking status: Former Smoker     Years: 8.00     Types: Cigarettes     Quit date: 6/21/1980     Smokeless tobacco: Never Used      Comment: started at 9 yo and quit at 16 yo     Alcohol use Yes      Comment: 3x/day wine or jodi     Drug use: No     Sexual activity: Not on file     Other Topics Concern     Not on file     Social History Narrative       Review of Systems     Constitutional:  Positive for fatigue. Negative for fever, chills, weight loss, weight gain, decreased appetite, night sweats, recent stressors, height gain, height loss, post-operative complications, incisional pain, hallucinations, increased energy, hyperactivity and confused.   HENT:  Positive for tinnitus. Negative for ear pain, hearing loss, nosebleeds, trouble swallowing, hoarse voice, mouth sores, sore throat, ear discharge, tooth pain, gum tenderness, taste disturbance, smell disturbance, hearing aid, bleeding gums, dry mouth, sinus pain, sinus congestion and neck mass.    Eyes:  Negative for double vision, pain, redness, eye pain, decreased vision, eye watering, eye bulging, eye dryness, flashing lights, spots, floaters, strabismus, tunnel vision, jaundice and eye irritation.   Respiratory:   Negative for cough, hemoptysis, sputum production, shortness of breath, wheezing, sleep disturbances due to breathing, snores loudly, respiratory pain, dyspnea on exertion, cough disturbing sleep and postural dyspnea.    Cardiovascular:  Negative for chest pain, dyspnea on exertion, palpitations, orthopnea, claudication, leg swelling, fingers/toes turn blue, hypertension, hypotension, syncope, history of heart murmur, chest pain on exertion, chest pain at rest, pacemaker, few scattered varicosities, leg pain, sleep disturbances due to breathing, tachycardia, light-headedness, exercise intolerance and edema.   Gastrointestinal:  Positive for diarrhea. Negative for heartburn, nausea,  "vomiting, abdominal pain, constipation, blood in stool, melena, rectal pain, bloating, hemorrhoids, bowel incontinence, jaundice, rectal bleeding, coffee ground emesis and change in stool.   Genitourinary:  Positive for dysuria and hematuria. Negative for bladder incontinence, urgency, flank pain, vaginal discharge, difficulty urinating, genital sores, dyspareunia, decreased libido, nocturia, voiding less frequently, arousal difficulty, abnormal vaginal bleeding, excessive menstruation, menstrual changes, hot flashes, vaginal dryness and postmenopausal bleeding.   Musculoskeletal:  Negative for myalgias, back pain, joint swelling, arthralgias, stiffness, muscle cramps, neck pain, bone pain, muscle weakness and fracture.   Skin:  Negative for nail changes, itching, poor wound healing, rash, hair changes, skin changes, acne, warts, poor wound healing, scarring, flaky skin, Raynaud's phenomenon, sensitivity to sunlight and skin thickening.   Neurological:  Negative for dizziness, tingling, tremors, speech change, seizures, loss of consciousness, weakness, light-headedness, numbness, headaches, disturbances in coordination, extremity numbness, memory loss, difficulty walking and paralysis.   Endo/Heme:  Negative for anemia, swollen glands and bruises/bleeds easily.   Psychiatric/Behavioral:  Negative for depression, hallucinations, memory loss, decreased concentration, mood swings and panic attacks.    Breast:  Negative for breast discharge, breast mass, breast pain and nipple retraction.   Endocrine:  Negative for altered temperature regulation, polyphagia, polydipsia, unwanted hair growth and change in facial hair.    I have reviewed and addressed the patient's review of symptoms for today's visit.     Physical Exam:    /79  Pulse 104  Temp 98.3  F (36.8  C) (Oral)  Resp 17  Ht 1.651 m (5' 5\")  Wt 65.1 kg (143 lb 9.6 oz)  SpO2 98%  Breastfeeding? No  BMI 23.9 kg/m2    General: Alert non-toxic appearing " female in no acute distress  HEENT: Normocephalic, atraumatic; PERRLA; no scleral icterus; oropharynx pink without lesions; neck supple; small enlarged left supraclavicular lymph node roughly 1cm in size  Pulmonary: Lungs clear to auscultation, no increased work of breathing noted  Cardiac: Regular rate and rhythm, S1S2, no clicks, murmurs, rubs, or gallops; bilateral lower extremities without edema  GI: Normoactive bowel sounds x4 quadrants, abdomen soft, non-distended, and non-tender to palpation without masses or organomegaly  : Not indicated  Heme: Cervical, supraclavicular, and inguinal nodes without lymphadenopathy  MSK: Moves all extremities, no obvious muscle wasting  Neuro: No gross deficits, normal gait  Skin: Appropriate color for race, warm and dry, no rashes or lesions to unclothed skin; no palmar plantar erythrodysesthesia  Psych: Pleasant and interactive, affect bright, makes appropriate eye contact, thought process linear    Labs:        Date Value Ref Range Status   12/12/2017 83 (H) 0 - 30 U/mL Final     Comment:     Assay Method:  Chemiluminescence using Siemens Centaur XP   11/17/2017 82 (H) 0 - 30 U/mL Final     Comment:     Assay Method:  Chemiluminescence using Siemens Centaur XP   10/20/2017 108 (H) 0 - 30 U/mL Final     Comment:     Assay Method:  Chemiluminescence using Siemens Centaur XP   09/22/2017 187 (H) 0 - 30 U/mL Final     Comment:     Assay Method:  Chemiluminescence using Siemens Centaur XP   08/10/2017 175 (H) 0 - 30 U/mL Final     Comment:     Assay Method:  Chemiluminescence using Siemens Centaur XP   07/13/2017 171 (H) 0 - 30 U/mL Final     Comment:     Assay Method:  Chemiluminescence using Siemens Centaur XP   06/15/2017 146 (H) 0 - 30 U/mL Final     Comment:     Assay Method:  Chemiluminescence using Siemens Centaur XP   05/18/2017 191 (H) 0 - 30 U/mL Final     Comment:     Assay Method:  Chemiluminescence using Siemens Centaur XP   04/19/2017 127 (H) 0 - 30 U/mL  Final     Comment:     Assay Method:  Chemiluminescence using Siemens Centaur XP   03/22/2017 132 (H) 0 - 30 U/mL Final     Comment:     Assay Method:  Chemiluminescence using Siemens Centaur XP     Assessment/Plan:  1) Recurrent stage IIIC bilateral ovarian cancer: Will plan for 3 additional cycles of chemotherapy (doxil/carbo/avastin), will add avastin to regimen - every 2 weeks. Pt to receive Cycle #4 today.  Will RTC in 3 months with repeat imaging to reassess, pt can see Dr. Riggins. Currently feeling well other than fatigue. Tolerating chemotherapy without dose limiting side effects, no apparent palmar plantar erythrodysesthesia. Discussed possible clinical trials for future treatment if needed (TRIO). Also explained lack of evidence in regards to re-treatment with parp as pt prefers parp-inhibitor. Reviewed risks of carboplatin reaction and avastin in detail and when to call her nurse/seek further care, including but not limited to high BP and/or bowel perforation. Reviewed signs and symptoms for when she should contact the clinic or seek additional care, including but not limited to fever, chills, inability to keep down food or fluids, nausea and vomiting not controlled with antiemetics, and diarrhea leading to dehydration. Patient to contact the clinic with any questions or concerns in the interim.     2) Genetic counseling: Testing has been performed and she is negative for mutations in BRCA1, BRCA2, EPCAM, MLH1, MSH2, MSH6, PMS2, PTEN, and TP53 genes    3) Health maintenance issues discussed include to continue following up with PCP for non-gynecologic concerns.    4) Magnesium - will give IV mag in infusion today, Mg 1.3 on 12/12/17. Pt reports taking supplement, but not daily.     5) Will plan for avastin infusion every 2 weeks at [facility]. Can see NP in interim.     6) Patient verbalized understanding of and agreement with plan    A total of 30  minutes spent with patient, over 50% spent in counseling  and coordination of care.    Nga Yeung MD    Department of Ob/Gyn and Women's Health  Division of Gynecologic Oncology  [facility]  [phone]      I, Jarek Young, am serving as a scribe to document services personally performed by Nga Yeung MD, based upon my observations and the provider's statements to me. All documentation has been reviewed by the aforementioned doctor prior to being entered into the official medical record.     I have reviewed the above note and agree with the scribe's notation as written.

## 2017-12-14 NOTE — LETTER
2017    RE: Odalys Nathan         [id number]         [facility] [id number]    To Whom It May Concern.     I am requesting the combination of Doxil and Avastin for a patient of Odalys sinclair :[birth year]. 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    She was diagnosed on 2012 at that time she had a Exploratory Laparotomy, MAR/BSO,  lysis of adhesions, appendectomy, repair cystotomy, omentectomy, nulv-vzqtkn-udhjgrecn-transverse-colon resection, Ileo-descending colon anastomosis, and CUSA.  She has received numerous chemotherapy agents starting 2012 with Taxol Carboplatin and IV/IP Taxol/Cisplatin. 2013 through 2013 she received Doxil Carboplatin for 6 cycles. In  May of 2014 she add exploratory laparotomy tumer debulking and intraperitoneal port placed which showed recurrent carcinoma, she subsequently received IV/IP  Taxol and Cisplatin for 4 cycles and 2 additional cycles  of Taxol Carboplatin. 2015 received 3 cycles of Avastin and Cytoxan followed by Radiation for 12 treatment to the right flank.  2016 through 2017 she received Niraparib. Then in 2017 CT showed progression and a rise in her ca-125  and at that time she was started on Doxil/Carbopatin and after 3 cycles, the Ct Scan showed stable disease withslight increase, stabilization of her Ca-125  and that is was I would liike to add Avastin to her regiment.      Based on this I have recommended chemotherapy with Carbo/Doxil/Avastin.  I am requesting approval of this for this disease process.      The Marybeth Trial done in  by Dr. Chan found that the addition of Avastin to chemotherapy resulted in significant improvement in progression-free survival , the primary study endpoint, and objective response rate in patients with platinum-resistant ovarian cancer.  Our data is supported by the Marybeth trial a randomized phase 3 trial  evaluating bevacizumab combined with chemotherapy for platinum -resistant recurrent ovarian cancer.  Response rates were 30.1% in the combination group a 12.6% in the chemotherapy alone group.  Median overall survival was 16.6 months in the combination group and 13 months on the chemotherapy alone.       Based on the data I would like to add Avastin in addition to her current treatment regiment of Doxil/Carbo to improve her overall response and to increase the potential for progression free survival.     Sincerely       Nga Yeung MD      Department of Ob/Gyn and Women's Health   Division of Gynecologic Oncology  [facility]   [phone]

## 2017-12-14 NOTE — NURSING NOTE
"Oncology Rooming Note    December 14, 2017 10:21 AM   Odalys Nathan is a 57 year old female who presents for:    Chief Complaint   Patient presents with     Oncology Clinic Visit     return patient visit for CT results/possible chemo related to Ovarian cancer, right (H)     Initial Vitals: /79  Pulse 104  Temp 98.3  F (36.8  C) (Oral)  Resp 17  Ht 1.651 m (5' 5\")  Wt 65.1 kg (143 lb 9.6 oz)  SpO2 98%  Breastfeeding? No  BMI 23.9 kg/m2 Estimated body mass index is 23.9 kg/(m^2) as calculated from the following:    Height as of this encounter: 1.651 m (5' 5\").    Weight as of this encounter: 65.1 kg (143 lb 9.6 oz). Body surface area is 1.73 meters squared.  No Pain (0) Comment: Data Unavailable   No LMP recorded. Patient has had a hysterectomy.  Allergies reviewed: Yes  Medications reviewed: Yes    Medications: Medication refills not needed today.  Pharmacy name entered into [employer]:    [id number] - [place], [place] - [address] [facility]ARMACY [facility] - [place], [place] - [address]  [facility] - [place], [place] - [address] [place] [address] PHARMACY [place], [place] - [phone]    Clinical concerns: no concerns dr. peterson was notified.    6 minutes for nursing intake (face to face time)     Dre Perdomo CMA              "

## 2017-12-14 NOTE — PATIENT INSTRUCTIONS
Contact Numbers    [facility] [place] [place] Main Line: [phone]  [facility] [place] [place] Triage:  [phone]    Call triage with chills and/or temperature greater than or equal to 100.5, uncontrolled nausea/vomiting, diarrhea, constipation, dizziness, shortness of breath, chest pain, bleeding, unexplained bruising, or any new/concerning symptoms, questions/concerns.     If you are having any concerning symptoms or wish to speak to a provider before your next infusion visit, please call your care coordinator or triage to notify them so we can adequately serve you.       After Hours: [phone]    If after hours, weekends, or holidays, call main hospital  and ask for Oncology doctor on call.           December 2017 Sunday Monday Tuesday Wednesday Thursday Friday Saturday                            1     2       3     4     5     6     7     8     9       10     11     12     [facility] DRAW    8:30 AM   (15 min.)   [facility]   [facility] Draw     CT CHEST/ABDOMEN/PELVIS W    8:45 AM   (20 min.)   [facility]   [facility] CT 13     14     P RETURN ACTIVE TREATMENT   10:25 AM   (20 min.)   Nga Yeung MD   [facility] ONC INFUSION 240   11:30 AM   (240 min.)    ONCOLOGY INFUSION   [facility] 15     16       17     18     19     20     21     22     23       24     25     26     27     28     LEVEL 2    8:00 AM   (120 min.)    INFUSION CHAIR 10   [facility]fusion Services 29 30 31 January 2018 Sunday Monday Tuesday Wednesday Thursday Friday Saturday        1     2     3     4     5     6       7     8     9     10     11     12     [facility] DRAW    7:15 AM   (15 min.)   [facility] DRAW   [facility] Draw     [facility] RETURN ACTIVE TREATMENT    7:25 AM   (40 min.)   Patricia Rocha APRN CNP   [facility] ONC INFUSION 240    8:30  AM   (240 min.)    ONCOLOGY INFUSION   [facility] 13       14     15     16     17     18     19     20       21     22     23     24     25     26     LEVEL 2    9:00 AM   (120 min.)    INFUSION CHAIR 3   [facility] 27       28     29     30     31                                Recent Results (from the past 24 hour(s))   Protein qualitative urine    Collection Time: 12/14/17 12:00 PM   Result Value Ref Range    Protein Albumin Urine Negative NEG^Negative mg/dL

## 2017-12-14 NOTE — MR AVS SNAPSHOT
After Visit Summary   12/14/2017    Odalys Nathan    MRN: [id number]           Patient Information     Date Of Birth          1960        Visit Information        Provider Department      12/14/2017 11:30 AM  31 ATC;  ONCOLOGY INFUSION [facility]        Today's Diagnoses     Ovarian cancer, right (H)    -  1    Encounter for long-term (current) use of medications        Ovarian cancer, left (H)        Drug-induced neutropenia (H)          Care Instructions    Contact Numbers    [facility] Main Line: [phone]  [facility] Triage:  [phone]    Call triage with chills and/or temperature greater than or equal to 100.5, uncontrolled nausea/vomiting, diarrhea, constipation, dizziness, shortness of breath, chest pain, bleeding, unexplained bruising, or any new/concerning symptoms, questions/concerns.     If you are having any concerning symptoms or wish to speak to a provider before your next infusion visit, please call your care coordinator or triage to notify them so we can adequately serve you.       After Hours: [phone]    If after hours, weekends, or holidays, call main hospital  and ask for Oncology doctor on call.           December 2017 Sunday Monday Tuesday Wednesday Thursday Friday Saturday                            1     2       3     4     5     6     7     8     9       10     11     12     [facility] LAB DRAW    8:30 AM   (15 min.)   [facility] LAB DRAW   [facility] Lab Draw     CT CHEST/ABDOMEN/PELVIS W    8:45 AM   (20 min.)   UCCT2   [facility] CT 13     14     [facility] RETURN ACTIVE TREATMENT   10:25 AM   (20 min.)   Nga Yeung MD   [facility] ONC INFUSION 240   11:30 AM   (240 min.)    ONCOLOGY INFUSION   [facility] 15     16       17     18     19     20     21     22     23       24     25     26     27     28     LEVEL 2    8:00 AM   (120 min.)    INFUSION CHAIR 10    [facility] 29     30       31 January 2018 Sunday Monday Tuesday Wednesday Thursday Friday Saturday        1     2     3     4     5     6       7     8     9     10     11     12     [facility] DRAW    7:15 AM   (15 min.)   [facility] DRAW   [facility] Draw     [facility] RETURN ACTIVE TREATMENT    7:25 AM   (40 min.)   Patricia Rocha APRN CNP   [facility] ONC INFUSION 240    8:30 AM   (240 min.)   UC ONCOLOGY INFUSION   [facility] 13       14     15     16     17     18     19     20       21     22     23     24     25     26     LEVEL 2    9:00 AM   (120 min.)   RH INFUSION CHAIR 3   [facility] 27       28     29     30     31                                Recent Results (from the past 24 hour(s))   Protein qualitative urine    Collection Time: 12/14/17 12:00 PM   Result Value Ref Range    Protein Albumin Urine Negative NEG^Negative mg/dL                 Follow-ups after your visit        Your next 10 appointments already scheduled     Dec 28, 2017  8:00 AM CST   Level 2 with RH INFUSION CHAIR 10   [facility]fusion [facility] ([facility])    [facility]  [address]  [facility] [address]   [phone]            Jan 12, 2018  7:15 AM CST   [facility] Draw with [facility] DRAW   [facility] ([facility])    [address] [place] [address] [phone]            Jan 12, 2018  7:40 AM CST   (Arrive by 7:25 AM)   Return Active Treatment with HAILE De Paz CNP   [facility] ([facility])    [address] [place] [address]   [phone]            Jan 12, 2018  8:30 AM CST   Infusion 240 with UC ONCOLOGY INFUSION, UC  20 ATC   [facility] ([facility])    [address]  [address]  [facility] [address] [phone]            Jan 26, 2018  9:00 AM CST   Level 2 with RH INFUSION CHAIR 3    Infusion Services ([facility])    [facility]  [address]  [facility] [id number] [phone]              Who to contact     If you have questions or need follow up information about today's clinic visit or your schedule please contact [facility] directly at [phone].  Normal or non-critical lab and imaging results will be communicated to you by MyChart, letter or phone within 4 business days after the clinic has received the results. If you do not hear from us within 7 days, please contact the clinic through "[employer]t or phone. If you have a critical or abnormal lab result, we will notify you by phone as soon as possible.  Submit refill requests through [employer] or call your pharmacy and they will forward the refill request to us. Please allow 3 business days for your refill to be completed.          Additional Information About Your Visit        [employer] Information     [employer] gives you secure access to your electronic health record. If you see a primary care provider, you can also send messages to your care team and make appointments. If you have questions, please call your primary care clinic.  If you do not have a primary care provider, please call [phone] and they will assist you.        Care EveryWhere ID     This is your Care EveryWhere ID. This could be used by other organizations to access your [facility] medical records  [id number]         Blood Pressure from Last 3 Encounters:   12/14/17 112/79   11/17/17 124/80   10/20/17 101/72    Weight from Last 3 Encounters:   12/14/17 65.1 kg (143 lb 9.6 oz)   11/17/17 64 kg (141 lb 1.6 oz)   10/20/17 64.7 kg (142 lb 9.6 oz)               We Performed the Following     Protein qualitative urine          Today's Medication Changes          These changes are accurate as of: 12/14/17  3:12 PM.  If you have any questions, ask your nurse or doctor.               These medicines have changed or have updated prescriptions.        Dose/Directions    magnesium oxide 400 MG tablet   Commonly known as:  MAG-OX   This may have changed:  when to take this   Used for:  Ovarian cancer, unspecified laterality (H)        Dose:  400 mg   Take 1 tablet (400 mg) by mouth 2 times daily   Quantity:  90 tablet   Refills:  3                Primary Care Provider Office Phone # Fax #    Aubrie Hester [phone]       [facility] [address] [facility] [address]        Equal Access to Services     SANDY CHAMBERS : Felicia Walter, wil mukherjee, vinita barahonaalkira staples, blanka szymanski. So [facility] [phone].    ATENCIÓN: Si habla español, tiene a bell disposición servicios gratuitos de asistencia lingüística. Llame al [phone].    We comply with applicable federal civil rights laws and [place] laws. We do not discriminate on the basis of race, color, national origin, age, disability, sex, sexual orientation, or gender identity.            Thank you!     Thank you for choosing [facility]  for your care. Our goal is always to provide you with excellent care. Hearing back from our patients is one way we can continue to improve our services. Please take a few minutes to complete the written survey that you may receive in the mail after your visit with us. Thank you!             Your Updated Medication List - Protect others around you: Learn how to safely use, store and throw away your medicines at www.disposemymeds.org.          This list is accurate as of: 12/14/17  3:12 PM.  Always use your most recent med list.                   Brand Name Dispense Instructions for  use Diagnosis    ASPIRIN PO      Take 325 mg by mouth daily        CALCIUM + D PO      Take 1 tablet by mouth daily.    Pelvic mass       cyanocobalamin 1000 MCG/ML injection    VITAMIN B12    1 mL    Inject 1 mL (1,000 mcg) into the muscle every 30 days    B12 deficiency       diphenoxylate-atropine 2.5-0.025 MG per tablet    LOMOTIL    60 tablet    Take 2 tablets by mouth 4 times daily as needed for diarrhea    Acute diarrhea       Ferrous Sulfate 324 (65 FE) MG Tbec     90 tablet    TAKE 1 TABLET BY MOUTH 3 TIMES DAILY WITH MEALS. TAKE WITH A SMALL AMOUNT OF ORANGE JUICE. DO NOT TAKE WITH CALCIUM.    Ovarian cancer, right (H), Anemia, unspecified type, Ovarian cancer, left (H)       HERBALS      daily        iohexol 140 MG/ML Soln solution    OMNIPAQUE    140 mL    Mix entire bottle (50ml) of contast with 600ml (20 ounces) of water and drink half 2 hrs prior to CT scan and half 1 hr prior to scan    Ovarian cancer, right (H), Metastatic cancer (H)       LEVOTHYROXINE SODIUM PO      Take by mouth daily        LORazepam 1 MG tablet    ATIVAN    30 tablet    Take 1 tablet (1 mg) by mouth every 6 hours as needed (Anxiety, Nausea/Vomiting or Sleep)    Ovarian cancer, unspecified laterality (H)       magnesium oxide 400 MG tablet    MAG-OX    90 tablet    Take 1 tablet (400 mg) by mouth 2 times daily    Ovarian cancer, unspecified laterality (H)       METFORMIN HCL PO      Take 500 mg by mouth daily        order for DME     3 each    Injection Supplies for Vitamin B12: 3cc syringes w/ 27 gauge needles, 1/2 inch length    B12 deficiency       potassium chloride 20 MEQ Packet    KLOR-CON     Take 20 mEq by mouth daily        prochlorperazine 10 MG tablet    COMPAZINE    30 tablet    Take 1 tablet (10 mg) by mouth every 6 hours as needed (nausea/vomiting)    Encounter for long-term (current) use of medications, Ovarian cancer, right (H), Ovarian cancer, left (H), Drug-induced neutropenia (H)        sulfamethoxazole-trimethoprim 800-160 MG per tablet    BACTRIM DS/SEPTRA DS    14 tablet    Take 1 tablet by mouth 2 times daily    Dysuria       VITAMIN C PO      Take 500 mg by mouth daily    Pelvic mass       VITAMIN D PO      Take 1,000 Units by mouth daily Unknown dose        VITAMIN E NATURAL PO      Take 100 Units by mouth daily

## 2017-12-14 NOTE — PROGRESS NOTES
Gynecologic Oncology Follow-Up Note  RE: Odalys Nathan  MRN: [id number]  : 1960  Date of Visit: 2017    CC: Odalys Nathan is a 57 year old year old female with recurrent stage IIIC bilateral ovarian cancer who presents today for disease management. S/p 3 cycles doxil/carbo,  83.     HPI: Odalys comes to the clinic accompanied by her  Marcos.  is stable from November at 83. Imaging reveals some disease response and some growth. Pt is feeling well health-wise with chemo. She continues to be fatigued, but no nausea or vomiting. No fever or chills. No bowel or urinary issues. No chest pain or SOB. No abdominal pain or distention. She is eating and drinking well. No neuropathy.     Brief Oncology History:  2012 - Admitted to hospital for 2 weeks of intermittent abdominal cramping, distention, diarrhea and N/V. CT of abdomen/pelvis significant for small bowel obstruction, a heterogenous soft tissue density in the pelvis, omental nodules, and ascites. Bilateral adnexal masses per U/S of pelvis. CA-125 was elevated at 987, CEA was normal at 1.0.    12 - Therapeutic paracentesis (4 L) with cytology confirming malignancy (IN positive, weak ER positive, CK-7 positive consistent with GYN primary). Surgery recommended d/t potential for falling blood counts 2/2 chemotherapy (patient is [employer] and limiting blood transfusion)    12 - Exploratory laparotomy, MAR BSO, lysis of adhesion, Appendectomy, Repair cystotomy, Omentectomy Gqvv-jbnajd-rhlfuvncx-transverse-colon resection, Ileo-descending colon anastomosis, CUSA, & Colonoscopy (done by Dr. Amato and colorectal team).  2012 - Admitted to hospital for bilateral pulmonary emboli and drainage of pleural effusion. Started on Lovenox.  12-3/21/12: Cycle #1-2 Carbo/Taxol IV  3/29/12 - Started on Keflex by her PCP for infection in her healing wound (immediately below her umbilicus).    12-12:  Cycle #3-6 IV chemo, Cycle #1 IV/IP chemo  7/16/12 -  8, CT PRADEEP - enrolled in  - observation arm  3/4/13-6/28/13:  6, 9, 12, 15, 20.  7/1/13: CT Chest, Abdomen, Pelvis IMPRESSION:  1. Worsening metastatic ovarian carcinoma suggested by increased size of soft tissue nodules anterior to the right psoas muscle, that may represent growing mesenteric lymphadenopathy.  2. Remaining prominent right lower quadrant mesenteric lymph nodes are not significantly changed from CT 7/16/2012.  3. Clustered nodular opacities in the right lower lobe are not significant change from 7/16/2012 and remain indeterminate. Again, the appearance and distribution is suggestive of an infectious etiology.  4. Stable 8 mm soft tissue nodule in left breast, unchanged since at least 02/20/2012. This can be observed on followup studies, but correlation with mammography could be considered.  Decision made to start Doxil/Carbo.  7/11/13: The left ventricular ejection fraction is normal at 66.4%.  7/12/13-9/6/13: Cycle #1-3 Doxil/Carbo.  10, 11.    9/30/13 CT C/A/P Impression:  1. Overall, favorable response to treatment with decreasing size of soft tissue nodules tracking along the anterior aspect of the right psoas muscle.  2. 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  3. Improved cluster of right lower lobe pulmonary nodular opacities. These may represent resolving infection.  10/3/13-12/9/13:  9, 8, 10. Cycle 4-6 Doxil/Carbo.    1/13/14 CT C/A/P Impression:   1. Stable appearance of metastatic ovarian cancer. Scattered soft tissue nodules along the anterior aspect of the right psoas muscle are unchanged in size. Mild mesenteric lymphadenopathy is unchanged.  2. Clustered micronodules in the right lower lobe are unchanged from 9/30/2013, but improved from 7/1/2013. This history suggests a postinflammatory/postinfectious etiology.  3. 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  1/16/14 Discussed multiple  options for her based on relatively stable-appearing disease on CT but slight increase in  (which has had small increase to 20 with last recurrence) including chemo break with recheck of  in 1 month, starting new chemo agent immediately, and exploratory surgery with possible resection of nodules. She is considered platinum-sensitive based on > 1 year remission after Taxol/Carbo, which we will take into consideration for future chemo planning. I am not inclined to surgery at this time given difficulty she already has with diarrhea secondary to past colon resection. I suspect we would need to resect further bowel due to mesenteric disease. Also explained inherent risks of any major surgery. Also mentioned maintenance chemo, but this has not been shown to increase overall survival and would likely decrease her quality of life without significant benefit. Family is going on vacation to [place] in 2 weeks and Odalys does not want to have chemo prior to that, so will plan to take 1 month break. She can have  at that time (discussed checking toady since last draw was in early December, but as it would likely not change treatment plan and she has h/o slow rising , will not check today).  2/17/14  16    2/20/14: Decision to take break from chemo for two months, followed by CT and CA-125.    4/21/14  27  4/21/14 CT C/A/P Impression:    1. Increased size of 2 low-attenuation lymph nodes anterior to the right psoas muscle is concerning for worsening metastatic ovarian cancer.    2. New circumferential thickening of a 3.8 cm length segment of distal transverse colon is likely physiologic. Recommend attention on followup imaging.    3. Grossly unchanged size of clustered small nodules versus scarring in the right lower lobe the lungs.    4. Stable thrombosis of the right ovarian vein.  5/14/14: Diagnostic laparoscopy converted to exploratory laparotomy and removal of mesenteric masses, tumor  "debulking, peritoneal biopsies and intraperitoneal port placement. On laparoscopy, it was noted that there were small nodules on the anterior abdominal wall near the previous incision, small nodules on the right pelvic sidewall as well. Nodules were palpated in the mesentery; however, as it was unable to clarify where the origin of the nodules was, the decision was made to open the patient. On opening there was found to be approximately a 3 cm nodule in the small bowel mesentery and another separate approximately 2 cm nodule in the bowel mesentery. Pelvis without evidence of cancer, some mesenteric lymph nodes were palpated. No evidence otherwise of any disseminated cancer throughout the abdomen.    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 (2/2)  -Largest metastasis: 1.3 cm  -See comment  D: Peritoneum, right paracolic gutter #2, biopsy:  -Fibroadipose tissue with granulomatous inflammation surrounding refractile material  -Negative for malignancy   E: Small bowel adhesion, biopsy:  -Fibroconnective tissue, consistent with adhesion  -Negative for malignancy  F: Lymph nodes, mesentry, not otherwise specified, excision:  -Two lymph nodes, negative for metastatic carcinoma (0/2)  G: Lymph node, mesentery, \"#2\", excision:  -One lymph node, negative for metastatic carcinoma (0/1)  H: Lymph nodes, mesentery, \"nodule #2\", excision:  -Five lymph nodes, negative for metastatic carcinoma (0/5)  COMMENT:  Some of the specimens show post-operative changes. Others show possible treatment related changes, including necrosis. The metastatic carcinoma in the mesenteric lymph nodes " (specimen C) shows variable morphology, including relatively low grade tumor with papillary architecture, and high grade tumor comprised of nests of tumor cells with irregular, slit-like spaces and marked nuclear pleomorphism.    5/29/14: Cycle 1 IV PACLitaxel / IP CISplatin / IP PACLitaxel.  - 28.  6/26/14: Cycle #2 IV/IP.  10  8/5/14: CT chest/abd/pelvis IMPRESSION     1. In this patient with ovarian cancer, overall findings are indicative of stable/slight improvement, as multiple mesenteric lymphadenopathy and scattered nodular peritoneal soft tissue mass lesions appear unchanged or slightly smaller since 4/21/2014.    2. 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 Mild dilatation of the second and the third portion of the duodenum with a narrow SMA angle. This could represent SMA syndrome, if clinically correlated.17/14:    Cycle #3 IV/IP.  10.    CT chest/abd/pelvis with contrast on 8/5/14    Impression:    1. In this patient with ovarian cancer, overall findings are indicative of stable/slight improvement, as multiple mesenteric lymphadenopathy and scattered nodular peritoneal soft tissue mass lesions appear unchanged or slightly smaller since 4/21/2014.    2. 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 Mild dilatation of the second and the third portion of the duodenum with a narrow SMA angle. This could represent SMA syndrome, if clinically correlated.  8/7/14: Cycle #4 Taxol/Carbo (changed from IV/IP).  10. She has been feeling okay. She is unsure if she can finish out the course of 6 cycles IV/IP taxol/cisplatin. She feels like she has the flu for about a week then starts feeling gradually better after each chemo cycle. Her spouse notes that she actually has been more sick with the treatments than she initially admits here. She was also previously having some rib pain. Denies any rib pain now. Denies any chest pain or shortness of breath.  Plan:  discussed recent CT cap results and switching to just IV as she is feeling miserable with IP treatments. Switch to IV carbo/taxol as patient is platinum sensitive.  We also discussed her taking part of the tesaro trial, which would require BRCA testing. She would like to take part in this trial if eligible.  8/20/14: Remove Intraperitoneal Port ( Port and catheter intact - discarded)  8/28/14: Cycle #5 Taxol/Carbo held due to thrombocytopenia.  6.    She denies any vaginal bleeding, no changes in her bowel or bladder habits, no nausea/emesis, no lower extremity edema, and no difficulties eating or sleeping. She denies any abdominal discomfort/bloating, no fevers or chills, and no chest pain or shortness of breath. She states her diarrhea is the same. She reports some fatigue which improves about 1-2 weeks after her chemotherapy. She states she does not need any medication refills and she was told she does not meet the criteria for the TESARO trial. She states she has 3 bags of iv fluids left over from her previous chemotherapy and will give these to herself. She states she is ready for her treatment today.    9/29/14: Cycle #6 Taxol/Carbo  6. Insurance questions regarding GSF coverage today. No concerns other than fatigue. Taking iron for anemia and does not desire blood transfusion. Using neulasta for neutropenia. Using home IV hydration if needed. Baseline unchanged. No abdominal bloating, constipation, diarrhea, pain, vaginal or rectal bleeding, cough or dyspnea, fluid retention.    10/16/14: Impression:    1. Nodular peritoneal soft tissue mass in the right lower quadrant adjacent to the psoas muscle is no longer appreciated. Adjacent prominent lymphadenopathy is unchanged from previous exam. No new peritoneal lesions.    2. 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    10/20/14:  5. CT chest/abdomen/pelvis on 10/16/14 showed nodular peritoneal soft tissue mass in the right lower  quadrant adjacent to the psoas muscle is no longer appreciated. Adjacent prominent lymphadenopathy is unchanged from previous exam. 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  1/27/15:  6.  4/28/15:  14.  5/26/15:  18.  6/2/15: CT cap Impression:  1. Postsurgical changes of hysterectomy and bilateral salpingo-oophorectomy for ovarian cancer. There is a new 8 mm hazy, ill-defined hypoattenuating lesion in hepatic segment 6 which is suspicious for a metastatic deposit. Further evaluation with ultrasound in recommended.    2. Increased size of a left retroperitoneal lymph node which is indeterminate but may represent a ciro metastasis. Mildly prominent lymph nodes in the right lower quadrant are not significantly changed.  3. Moderate colonic stool burden.    6/4/15: US abdomen IMPRESSION:    Hyperechoic lesion in the right hepatic lobe, consistent with hemangioma. This does not corresponds to the area of the lesion seen on CT from 6/2/2015. An MR would be helpful for identifying and characterizing the lesion from the recent CT.  6/12/15: MR abd IMPRESSION:  1. New 20 x 11 mm enhancing lesions between the right obliques, concerning for metastatic disease. This lesions should be amenable to percutaneous biopsy, if indicated.  2. Correlating to the lesion visualized on comparison CT is a hepatic segment 6 subcapsular 7 mm lesion. Overall the appearance favors the diagnosis of a simple cyst. However, there is faint suggestion of mild peripheral arterial enhancement. Although this is favored as  artifactual, this should be followed up to confirm stability. Recommend 6 month followup.  3. Hepatic segment 6, 5 mm lesion too small to technically characterize. Differential would favor FNH, less likely flash filling hemangioma. Recommend attention on followup.  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     9/1/15: Cycle #1 Avastin/Cytoxan.   31. /62    9/24/15:feeling generally well. She says she has been having back and stomach spasms. She is taking cytosine daily (she ran out yesterday). She also says its affecting her voice. Admits that it burns occasionally. She is eating and drinking normal. She also admit diarrhea, 5-7 times daily, lose/watery. She trying to stay hydrated and eat fiber. She also says that her body is sore, especially the bottom of her feet. Her blood pressure is normal. She also admits having headache after her first infusion.      9/24/15: Cycle #2 Avastin/Cytoxan.  19. /75  10/15/15: Cycle #3 Avastin/Cytoxan.  16. /79    11/6/15: CT c/a/p IMPRESSION:    1. Stable postoperative change of MAR/BSO for ovarian cancer.  2. The lesion in the right flank abdominal musculature is slightly decreased in size. Otherwise, stable examination.  2. No evidence of metastatic disease in the chest.    11/20/15: Treatment planning visit,  16    11/25/15: surgical pathology report  FINAL DIAGNOSIS:  Soft tissue, right oblique muscle mass, excision:  -Recurrent ovarian serous carcinoma  -Carcinoma is present less than 1 mm from one resection margin  -Background skeletal muscle and fibroadipose tissue    1/4/16:  22  1/11/16-1/27/16: Radiation to right flank x 12 treatments  4/7/2016:  94. CT cap IMPRESSION:    In this patient with ovarian cancer status post MAR/BSO and descending/transverse colectomy:  1. No evidence for malignancy in the chest, abdomen, or pelvis.  2. Stable small hypodense segment 6 liver lesion, appears more likely benign, possibly a cyst.  5/13/16:  124.  6/3/16: PET CT IMPRESSION: In this patient with a history of ovarian cancer:  1. Hypermetabolic and enlarging periaortic and perihepatic lymphadenopathy compatible with metastatic disease, as  detailed above.  2. Although hypodense lesion in hepatic segment 6 has been present since 6/2/2015 associated hypermetabolism makes this lesion highly concerning for metastatic disease.    Plan: to start Niraparib under TESARO study.     6/9/16:  137.  6/14/16: Cycle #1 Niraparib.    6/28/16: Cycle #1 D15 Niraparib.    7/5/16:  100.    7/11/16: Cycle #2 Nraparib.   83.    8/3/2016: PET CT IMPRESSION:    In this patient with known history of ovarian cancer:  1) New pleural based nodular opacities in the lateral and inferior aspects of the bilateral lower lobes, worse in the left lung. Likely infection. Close follow up is recommended.      2) Slight decrease in hypermetabolic abdominal lymphadenopathy. 2 hypermetabolic lymph nodes persist.  3) Unchanged right hepatic lobe metastatic lesion.     8/9/16: Cycle #3 Niraparib.  69.  9/6/16: Cycle #4 Niraparib.  53. Dose held due to anemia.  9/13/16: Eval for potential cycle 4 niraparib. Dose held due to anemia.  10/4/16: CT CAP impression:  IMPRESSION: In this patient with a known history of ovarian cancer:  1. There has been interval resolution of pleural-based nodular  opacities which likely represented infection.  2. Abdominal lymphadenopathy in the form of 2 portacaval lymph nodes  have not significantly changed in size, noted to be hypermetabolic on  prior PET/CT.  3. Previously demonstrated metastatic lesion in the right lobe of the  liver is not significantly changed.  10/11/16: Cycle #5 Niraparib.  60  11/1/16: C6 niraparib,  75  11/29/16: C7 niraparib.  78. CT CAP impression as follows:  Target lesions (RECIST criteria):       A previously described target lesion superior to the head of the  pancreas (series 2, image 64)  (referred to as a perihepatic node on  6/3/2016) may not be a valid target lesion because it measured less  than 1.5 cm originally. However, this particular node has decreased in  size, now  measuring 7 mm in short axis versus 14 mm on 6/3/2016 when  measured in a similar fashion.       2.3 cm short axis portacaval lymph node on series 2 image 67,  previously 2.0 cm on 10/4/2016.       1.2 cm subtle hypodensity in hepatic segment 6 on series 2 image 75,  stable on multiple studies since at least 6/3/2016     Sum of diameters today: 3.5 cm. Sum of diameters 10/4/2016: 3.2 cm.  Growth = 9%.    12/27/16: C8 niraparib.  105.  1/25/17: C9 niraparib.  108.  2/23/17: C10 niraparib.  132.   CT CAP impression:  Sum of target lesion diameters today: 3.7 cm. Sum of target lesion  diameters on 11/28/2016: 3.5 cm. Growth= 6%  1. In this patient with history of ovarian cancer there is stable  disease by RECIST criteria as evidenced by:   1a. Mildly increased size of liver metastasis.  1b. Stable portacaval lymphadenopathy.  1c. No evidence of metastatic disease in the chest.  2. Trace emphysematous changes of the lungs.  3/22/17: C11 niraparib.  132.  4/19/17: C12 niraparib.  127.  5/16/17: CT CAP IMPRESSION: In this patient with ovarian cancer:  1. Mildly increased size of hepatic metastasis segment 6 with subtle increased capsular retraction.  2. Stable edmundo hepatis nodes, with mild increase in size of periaortic lymph nodes.  3. Prominent left supraclavicular lymph node with subtle increase in size compared to prior studies, particularly comparing to 10/4/2016.  4. Mild subtle groundglass opacities in the right upper lobe, not present on prior study, lesser extent in the right lower lobe.  Findings may represent infection, additional consideration is malignancy (less likely), and attention on follow-up study  Recommended.  Addendum:   Prominent left supraclavicular lymph node (3/25) is stable from most recent CT performed 2/20/2017, currently measuring 14 x 16 mm, previously 14 x 16 mm on 2/20/2017.      The portal caval lymph node/edmundo hepatis lymph node is stable from 2/20/2017,  measuring 21 mm.      Clarification of size of the para-aortic lymph node (series 3 image 327). It short axis measurement is 11 mm versus 9 mm on prior study.      Hepatic segment 6 triangular-shaped low density lesion (3/362) measures 14 mm, previously measured 12 mm, demonstrating a  possible/questionable minimal subtle increase in size. Similarly the lymph nodes noted above in the supraclavicular and para-aortic regions demonstrate possible minimal possible subtle increase in size.      5/18/17: Cycle #13 Niraparib.  191.  6/15/17: Cycle #14 Niraparib.  146.  7/13/17: Cycle #15 Niraparib 200 mg.  171     CT (8/9/17):       IMPRESSION:  1. Segment 6 hepatic metastasis is stable to minimally increased in  size.  2. Slight increase in multicentric adenopathy, most pronounced at the  edmundo hepatis. Additional sites include the inferior left  neck/supraclavicular region and retroperitoneum which appear stable to  minimally increased.      8/10/17:  175  9/14/17: MUGA LVEF 54%  9/22/17: C1D1 carboplatin/Doxil.  187.  10/19/17: C2D1 carboplatin/Doxil.  108.  11/17/17: C3D1 carboplatin/Doxil.  82.    12/12/17: CT CAP:  IMPRESSION: In this patient with history of ovarian cancer status post  surgery and currently undergoing chemotherapy there is evidence of  mixed response to chemotherapy:  1. Slightly increased size of a hepatic segment 6 metastasis,  suggesting disease progression.  2. Left supraclavicular, edmundo hepatis and retroperitoneal  lymphadenopathy is variably increased and decreased, as described in  the findings, compatible with mixed response.   Abdomen and pelvis:   Slightly increased ill-defined hypoenhancing hepatic segment 6  subcapsular lesion with associated capsular retraction, now measuring  2.5 x 2.2 x 2.5 cm, previously 1.9 x 1.9 x 2.1 cm.   Redemonstration of enlarged, heterogeneously enhancing edmundo hepatis  and retroperitoneal lymphadenopathy. Some of  which have slightly  increased in size since 8/9/2017, for example 2.4 x 1.5 cm lymph node  along the posterior aspect of the right renal vein (series 3, image  323), previously 1.9 x 1.2 cm. While others are unchanged or have  slightly decreased in size    Past Medical History:   Diagnosis Date     Antiplatelet or antithrombotic long-term use      Ascites      Blood clot in the legs      Diabetes (H)      Ovarian cancer (H)     serous,stg IV     Pleural effusion      Pulmonary embolism (H) 2/2012     Refusal of blood transfusions as patient is [employer]      Short gut syndrome      Subclinical hypothyroidism 4/18/2013     Thrombosis of leg        Past Surgical History:   Procedure Laterality Date     COLECTOMY       COLONOSCOPY  2/1/2012    Procedure:COLONOSCOPY; With Biopsy; Surgeon:TONI SULLIVAN; Location:[facility]     HYSTERECTOMY TOTAL ABD, RHIANNA SALPINGO-OOPHORECTOMY, NODE DISSECTION, TUMOR DEBULKING, COMBINED  2/1/2012    Procedure:COMBINED HYSTERECTOMY TOTAL ABDOMINAL, BILATERAL SALPINGO-OOPHORECTOMY, NODE DISSECTION, TUMOR DEBULKING;  Exploratory Laparotomy, Total Abdominal Hysterectomy, Bilateral Salpingo-Oophorectomy, appendectomy,lysis of adhesions, ileal, ascending, transverse and splenic flexure resection, ileal descending bowel renanastomosis, incidental cystotomy repair, CUSA procedure and colonoscopy ; [facility]     INSERT PORT PERITONEAL ACCESS  4/3/2012    Procedure:INSERT PORT PERITONEAL ACCESS; Intraperitoneal Port Placement (c-arm); Surgeon:SAMUEL CARRASCO; Location:[facility]     INSERT PORT PERITONEAL ACCESS  5/14/2014    Procedure: INSERT PORT PERITONEAL ACCESS;  Surgeon: Nga Yeung MD;  Location: [facility] OR     INSERT PORT VASCULAR ACCESS       LAPAROSCOPY DIAGNOSTIC (GYN)  5/14/2014    Procedure: LAPAROSCOPY DIAGNOSTIC (GYN);  Surgeon: Nga Yeung MD;  Location: [facility] OR     LAPAROTOMY EXPLORATORY Right 11/25/2015    Procedure: LAPAROTOMY EXPLORATORY;  Surgeon: Nga Yeung  MD Airam;  Location: [facility]     LAPAROTOMY, TUMOR DEBULKING, COMBINED  5/14/2014    Procedure: COMBINED LAPAROTOMY, TUMOR DEBULKING;  Surgeon: Nga Yeung MD;  Location: [facility] OR     REMOVE CATHETER PERITONEAL N/A 8/20/2014    Procedure: REMOVE CATHETER PERITONEAL;  Surgeon: Nga Yeung MD;  Location: [facility] OR     VASCULAR SURGERY      stent left iliac vein       Current Outpatient Prescriptions   Medication     iohexol (OMNIPAQUE) 140 MG/ML SOLN solution     sulfamethoxazole-trimethoprim (BACTRIM DS/SEPTRA DS) 800-160 MG per tablet     Ferrous Sulfate 324 (65 FE) MG TBEC     LORazepam (ATIVAN) 1 MG tablet     prochlorperazine (COMPAZINE) 10 MG tablet     LEVOTHYROXINE SODIUM PO     order for DME     METFORMIN HCL PO     diphenoxylate-atropine (LOMOTIL) 2.5-0.025 MG per tablet     cyanocobalamin (VITAMIN B12) 1000 MCG/ML injection     magnesium oxide (MAG-OX) 400 MG tablet     ASPIRIN PO     potassium chloride (KLOR-CON) 20 MEQ packet     VITAMIN E NATURAL PO     Cholecalciferol (VITAMIN D PO)     HERBALS     Ascorbic Acid (VITAMIN C PO)     Calcium Carbonate-Vitamin D (CALCIUM + D PO)     No current facility-administered medications for this visit.      Facility-Administered Medications Ordered in Other Visits   Medication     sodium chloride (PF) 0.9% PF flush 20 mL     heparin 100 UNIT/ML injection 500 Units          No Known Allergies    Family History   Problem Relation Age of Onset     CANCER Mother 69     lung, smoker     CANCER Maternal Uncle 65     brain     Colon Cancer Maternal Aunt 80     colon       Social History     Social History     Marital status:      Spouse name: N/A     Number of children: N/A     Years of education: N/A     Occupational History     Not on file.     Social History Main Topics     Smoking status: Former Smoker     Years: 8.00     Types: Cigarettes     Quit date: 6/21/1980     Smokeless tobacco: Never Used      Comment: started at 11 yo and quit at 16 yo      Alcohol use Yes      Comment: 3x/day wine or jodi     Drug use: No     Sexual activity: Not on file     Other Topics Concern     Not on file     Social History Narrative       Review of Systems     Constitutional:  Positive for fatigue. Negative for fever, chills, weight loss, weight gain, decreased appetite, night sweats, recent stressors, height gain, height loss, post-operative complications, incisional pain, hallucinations, increased energy, hyperactivity and confused.   HENT:  Positive for tinnitus. Negative for ear pain, hearing loss, nosebleeds, trouble swallowing, hoarse voice, mouth sores, sore throat, ear discharge, tooth pain, gum tenderness, taste disturbance, smell disturbance, hearing aid, bleeding gums, dry mouth, sinus pain, sinus congestion and neck mass.    Eyes:  Negative for double vision, pain, redness, eye pain, decreased vision, eye watering, eye bulging, eye dryness, flashing lights, spots, floaters, strabismus, tunnel vision, jaundice and eye irritation.   Respiratory:   Negative for cough, hemoptysis, sputum production, shortness of breath, wheezing, sleep disturbances due to breathing, snores loudly, respiratory pain, dyspnea on exertion, cough disturbing sleep and postural dyspnea.    Cardiovascular:  Negative for chest pain, dyspnea on exertion, palpitations, orthopnea, claudication, leg swelling, fingers/toes turn blue, hypertension, hypotension, syncope, history of heart murmur, chest pain on exertion, chest pain at rest, pacemaker, few scattered varicosities, leg pain, sleep disturbances due to breathing, tachycardia, light-headedness, exercise intolerance and edema.   Gastrointestinal:  Positive for diarrhea. Negative for heartburn, nausea, vomiting, abdominal pain, constipation, blood in stool, melena, rectal pain, bloating, hemorrhoids, bowel incontinence, jaundice, rectal bleeding, coffee ground emesis and change in stool.   Genitourinary:  Positive for dysuria and hematuria.  "Negative for bladder incontinence, urgency, flank pain, vaginal discharge, difficulty urinating, genital sores, dyspareunia, decreased libido, nocturia, voiding less frequently, arousal difficulty, abnormal vaginal bleeding, excessive menstruation, menstrual changes, hot flashes, vaginal dryness and postmenopausal bleeding.   Musculoskeletal:  Negative for myalgias, back pain, joint swelling, arthralgias, stiffness, muscle cramps, neck pain, bone pain, muscle weakness and fracture.   Skin:  Negative for nail changes, itching, poor wound healing, rash, hair changes, skin changes, acne, warts, poor wound healing, scarring, flaky skin, Raynaud's phenomenon, sensitivity to sunlight and skin thickening.   Neurological:  Negative for dizziness, tingling, tremors, speech change, seizures, loss of consciousness, weakness, light-headedness, numbness, headaches, disturbances in coordination, extremity numbness, memory loss, difficulty walking and paralysis.   Endo/Heme:  Negative for anemia, swollen glands and bruises/bleeds easily.   Psychiatric/Behavioral:  Negative for depression, hallucinations, memory loss, decreased concentration, mood swings and panic attacks.    Breast:  Negative for breast discharge, breast mass, breast pain and nipple retraction.   Endocrine:  Negative for altered temperature regulation, polyphagia, polydipsia, unwanted hair growth and change in facial hair.    I have reviewed and addressed the patient's review of symptoms for today's visit.     Physical Exam:    /79  Pulse 104  Temp 98.3  F (36.8  C) (Oral)  Resp 17  Ht 1.651 m (5' 5\")  Wt 65.1 kg (143 lb 9.6 oz)  SpO2 98%  Breastfeeding? No  BMI 23.9 kg/m2    General: Alert non-toxic appearing female in no acute distress  HEENT: Normocephalic, atraumatic; PERRLA; no scleral icterus; oropharynx pink without lesions; neck supple; small enlarged left supraclavicular lymph node roughly 1cm in size  Pulmonary: Lungs clear to auscultation, " no increased work of breathing noted  Cardiac: Regular rate and rhythm, S1S2, no clicks, murmurs, rubs, or gallops; bilateral lower extremities without edema  GI: Normoactive bowel sounds x4 quadrants, abdomen soft, non-distended, and non-tender to palpation without masses or organomegaly  : Not indicated  Heme: Cervical, supraclavicular, and inguinal nodes without lymphadenopathy  MSK: Moves all extremities, no obvious muscle wasting  Neuro: No gross deficits, normal gait  Skin: Appropriate color for race, warm and dry, no rashes or lesions to unclothed skin; no palmar plantar erythrodysesthesia  Psych: Pleasant and interactive, affect bright, makes appropriate eye contact, thought process linear    Labs:        Date Value Ref Range Status   12/12/2017 83 (H) 0 - 30 U/mL Final     Comment:     Assay Method:  Chemiluminescence using Siemens Centaur XP   11/17/2017 82 (H) 0 - 30 U/mL Final     Comment:     Assay Method:  Chemiluminescence using Siemens Centaur XP   10/20/2017 108 (H) 0 - 30 U/mL Final     Comment:     Assay Method:  Chemiluminescence using Siemens Centaur XP   09/22/2017 187 (H) 0 - 30 U/mL Final     Comment:     Assay Method:  Chemiluminescence using Siemens Centaur XP   08/10/2017 175 (H) 0 - 30 U/mL Final     Comment:     Assay Method:  Chemiluminescence using Siemens Centaur XP   07/13/2017 171 (H) 0 - 30 U/mL Final     Comment:     Assay Method:  Chemiluminescence using Siemens Centaur XP   06/15/2017 146 (H) 0 - 30 U/mL Final     Comment:     Assay Method:  Chemiluminescence using Siemens Centaur XP   05/18/2017 191 (H) 0 - 30 U/mL Final     Comment:     Assay Method:  Chemiluminescence using Siemens Centaur XP   04/19/2017 127 (H) 0 - 30 U/mL Final     Comment:     Assay Method:  Chemiluminescence using Siemens Centaur XP   03/22/2017 132 (H) 0 - 30 U/mL Final     Comment:     Assay Method:  Chemiluminescence using Siemens Centaur XP           Assessment/Plan:  1) Recurrent stage IIIC  bilateral ovarian cancer: Will plan for 3 additional cycles of chemotherapy (doxil/carbo/avastin), will add avastin to regimen - every 2 weeks. Pt to receive Cycle #4 today.  Will RTC in 3 months with repeat imaging to reassess, pt can see Dr. Riggins. Currently feeling well other than fatigue. Tolerating chemotherapy without dose limiting side effects, no apparent palmar plantar erythrodysesthesia. Discussed possible clinical trials for future treatment if needed (TRIO). Also explained lack of evidence in regards to re-treatment with parp as pt prefers parp-inhibitor. Reviewed risks of carboplatin reaction and avastin in detail and when to call her nurse/seek further care, including but not limited to high BP and/or bowel perforation. Reviewed signs and symptoms for when she should contact the clinic or seek additional care, including but not limited to fever, chills, inability to keep down food or fluids, nausea and vomiting not controlled with antiemetics, and diarrhea leading to dehydration. Patient to contact the clinic with any questions or concerns in the interim.     2) Genetic counseling: Testing has been performed and she is negative for mutations in BRCA1, BRCA2, EPCAM, MLH1, MSH2, MSH6, PMS2, PTEN, and TP53 genes    3) Health maintenance issues discussed include to continue following up with PCP for non-gynecologic concerns.    4) Magnesium - will give IV mag in infusion today, Mg 1.3 on 12/12/17. Pt reports taking supplement, but not daily.     5) Will plan for avastin infusion every 2 weeks at [facility]. Can see NP in interim.     6) Patient verbalized understanding of and agreement with plan    A total of 30  minutes spent with patient, over 50% spent in counseling and coordination of care.    Nga Yeung MD    Department of Ob/Gyn and Women's Health  Division of Gynecologic Oncology  [facility]  [phone]    I, Jarek Young, am serving as a  scribe to document services personally performed by Nga Yeung MD, based upon my observations and the provider's statements to me. All documentation has been reviewed by the aforementioned doctor prior to being entered into the official medical record.     I have reviewed the above note and agree with the scribe's notation as written.

## 2017-12-19 ENCOUNTER — CARE COORDINATION (OUTPATIENT)
Dept: ONCOLOGY | Facility: CLINIC | Age: 57
End: 2017-12-19

## 2017-12-19 NOTE — PROGRESS NOTES
Care Coordinator Note  Reviewed treatmetn plan and adding in Avastin on day 1 and 14. Reviewed side effects.  Informed by pharmacy that her insurance did not cover it.   Needed a Letter.         Patient verbalized back understanding of the above information discussed.   Face to face time spent with patient 10.  Tammy CISNEROS RN, OCN  Care Coordinator   Gynecologic Cancer   Office [phone]  Pager [phone] #[phone]

## 2017-12-19 NOTE — PROGRESS NOTES
Care Coordinator Note  Letter written and signed by Dr Yeung and then taken to yas in Pharmacy.  Will await approval from insurance company.  Tammy CISNEROS RN, OCN  Care Coordinator   Gynecologic Cancer   Office [phone]  Pager [phone] #[phone]      Tammy CISNEROS RN, OCN  Care Coordinator   Gynecologic Cancer   Office [phone]  Pager [phone] #[phone]

## 2017-12-28 ENCOUNTER — INFUSION THERAPY VISIT (OUTPATIENT)
Dept: INFUSION THERAPY | Facility: CLINIC | Age: 57
End: 2017-12-28
Attending: OBSTETRICS & GYNECOLOGY
Payer: COMMERCIAL

## 2017-12-28 ENCOUNTER — HOSPITAL ENCOUNTER (OUTPATIENT)
Facility: CLINIC | Age: 57
Setting detail: SPECIMEN
Discharge: HOME | End: 2017-12-28
Attending: INTERNAL MEDICINE | Admitting: OBSTETRICS & GYNECOLOGY
Payer: COMMERCIAL

## 2017-12-28 VITALS
DIASTOLIC BLOOD PRESSURE: 58 MMHG | SYSTOLIC BLOOD PRESSURE: 125 MMHG | TEMPERATURE: 98.1 F | OXYGEN SATURATION: 99 % | RESPIRATION RATE: 16 BRPM | HEART RATE: 94 BPM

## 2017-12-28 DIAGNOSIS — D70.2 DRUG-INDUCED NEUTROPENIA (H): ICD-10-CM

## 2017-12-28 DIAGNOSIS — C56.1 OVARIAN CANCER, RIGHT (H): ICD-10-CM

## 2017-12-28 DIAGNOSIS — C56.2 OVARIAN CANCER, LEFT (H): ICD-10-CM

## 2017-12-28 DIAGNOSIS — Z79.899 ENCOUNTER FOR LONG-TERM (CURRENT) USE OF MEDICATIONS: Primary | ICD-10-CM

## 2017-12-28 LAB
BASOPHILS # BLD AUTO: 0 10E9/L (ref 0–0.2)
BASOPHILS NFR BLD AUTO: 0.2 %
DIFFERENTIAL METHOD BLD: ABNORMAL
EOSINOPHIL # BLD AUTO: 0 10E9/L (ref 0–0.7)
EOSINOPHIL NFR BLD AUTO: 0.7 %
ERYTHROCYTE [DISTWIDTH] IN BLOOD BY AUTOMATED COUNT: 15.6 % (ref 10–15)
HCT VFR BLD AUTO: 28.4 % (ref 35–47)
HGB BLD-MCNC: 9.8 G/DL (ref 11.7–15.7)
IMM GRANULOCYTES # BLD: 0 10E9/L (ref 0–0.4)
IMM GRANULOCYTES NFR BLD: 0.2 %
LYMPHOCYTES # BLD AUTO: 2.2 10E9/L (ref 0.8–5.3)
LYMPHOCYTES NFR BLD AUTO: 52.7 %
MAGNESIUM SERPL-MCNC: 1.2 MG/DL (ref 1.6–2.3)
MCH RBC QN AUTO: 39.5 PG (ref 26.5–33)
MCHC RBC AUTO-ENTMCNC: 34.5 G/DL (ref 31.5–36.5)
MCV RBC AUTO: 115 FL (ref 78–100)
MONOCYTES # BLD AUTO: 0.4 10E9/L (ref 0–1.3)
MONOCYTES NFR BLD AUTO: 9 %
NEUTROPHILS # BLD AUTO: 1.6 10E9/L (ref 1.6–8.3)
NEUTROPHILS NFR BLD AUTO: 37.2 %
NRBC # BLD AUTO: 0 10*3/UL
NRBC BLD AUTO-RTO: 0 /100
PLATELET # BLD AUTO: 69 10E9/L (ref 150–450)
POTASSIUM SERPL-SCNC: 3.7 MMOL/L (ref 3.4–5.3)
RBC # BLD AUTO: 2.48 10E12/L (ref 3.8–5.2)
WBC # BLD AUTO: 4.2 10E9/L (ref 4–11)

## 2017-12-28 PROCEDURE — 25000128 H RX IP 250 OP 636: Performed by: OBSTETRICS & GYNECOLOGY

## 2017-12-28 PROCEDURE — 85025 COMPLETE CBC W/AUTO DIFF WBC: CPT | Performed by: OBSTETRICS & GYNECOLOGY

## 2017-12-28 PROCEDURE — 83735 ASSAY OF MAGNESIUM: CPT | Performed by: OBSTETRICS & GYNECOLOGY

## 2017-12-28 PROCEDURE — 84132 ASSAY OF SERUM POTASSIUM: CPT | Performed by: OBSTETRICS & GYNECOLOGY

## 2017-12-28 PROCEDURE — 96365 THER/PROPH/DIAG IV INF INIT: CPT

## 2017-12-28 RX ORDER — HEPARIN SODIUM (PORCINE) LOCK FLUSH IV SOLN 100 UNIT/ML 100 UNIT/ML
5 SOLUTION INTRAVENOUS EVERY 8 HOURS
Status: DISCONTINUED | OUTPATIENT
Start: 2017-12-28 | End: 2017-12-28 | Stop reason: HOSPADM

## 2017-12-28 RX ADMIN — SODIUM CHLORIDE, PRESERVATIVE FREE 5 ML: 5 INJECTION INTRAVENOUS at 10:33

## 2017-12-28 RX ADMIN — MAGNESIUM SULFATE HEPTAHYDRATE 2 G: 500 INJECTION, SOLUTION INTRAMUSCULAR; INTRAVENOUS at 09:32

## 2017-12-28 ASSESSMENT — PAIN SCALES - GENERAL: PAINLEVEL: NO PAIN (0)

## 2017-12-28 NOTE — PROGRESS NOTES
Infusion Nursing Note:  Odalys Nathan presents today for C4D14 Avastin HELD.  IV mag given  Patient seen by provider today: Yes:  Patricia   present during visit today: Not Applicable.    Note: PLT count from today 69,000.  Obtained orders to hold Avastin today.  Received IV Mag replacement for Mag level of 1.2    Intravenous Access:  Lab draw site R chest port, Needle type port needle, Gauge 20 3/4in.  Labs drawn without difficulty.  Implanted Port.    Treatment Conditions:  Lab Results   Component Value Date    HGB 9.8 12/28/2017     Lab Results   Component Value Date    WBC 4.2 12/28/2017      Lab Results   Component Value Date    ANEU 1.6 12/28/2017     Lab Results   Component Value Date    PLT 69 12/28/2017      Results reviewed, labs did NOT meet treatment parameters: Avastin Held per Patricia.        Post Infusion Assessment:  Patient tolerated infusion without incident.  Site patent and intact, free from redness, edema or discomfort.  No evidence of extravasations.  Access discontinued per protocol.    Discharge Plan:   Discharge instructions reviewed with: Patient.  Patient discharged in stable condition accompanied by: self and .  Departure Mode: Ambulatory.  Scheduled for labs, NP visit and chemo on 1/12/18.    ARNOLD GILLETTE RN

## 2017-12-28 NOTE — MR AVS SNAPSHOT
After Visit Summary   12/28/2017    Odalys Nathan    MRN: [id number]           Patient Information     Date Of Birth          1960        Visit Information        Provider Department      12/28/2017 8:00 AM RH INFUSION CHAIR 10 [facility] Infusion [facility]        Today's Diagnoses     Encounter for long-term (current) use of medications    -  1    Ovarian cancer, right (H)        Ovarian cancer, left (H)        Drug-induced neutropenia (H)           Follow-ups after your visit        Your next 10 appointments already scheduled     Jan 12, 2018  7:15 AM CST   [facility] Lab Draw with  [facility] DRAW   [facility] Draw ([facility])    [address] [place] [address]  [address] [facility] [address]   [phone]            Jan 12, 2018  7:40 AM CST   (Arrive by 7:25 AM)   Return Active Treatment with HAILE De Paz CNP   [facility] ([facility])    [address] [place] [address]   [phone]            Jan 12, 2018  8:30 AM CST   Infusion 240 with UC ONCOLOGY INFUSION, UC 20 ATC   [facility] ([facility])    [address] [place] [address]   [phone]            Jan 26, 2018  9:00 AM CST   Level 2 with RH INFUSION CHAIR 3   [facility]fusion [facility] ([facility])    [facility]  [address]  [facility] [id number] [phone]              Who to contact     If you have questions or need follow up information about today's clinic visit or your schedule please contact [facility] INFUSION SERVICES directly at [phone].  Normal or non-critical lab and imaging results will be communicated to you by MyChart, letter or phone within 4 business days after the clinic has received the results. If you do not hear  from us within 7 days, please contact the clinic through [employer] or phone. If you have a critical or abnormal lab result, we will notify you by phone as soon as possible.  Submit refill requests through [employer] or call your pharmacy and they will forward the refill request to us. Please allow 3 business days for your refill to be completed.          Additional Information About Your Visit        [employer] Information     [employer] gives you secure access to your electronic health record. If you see a primary care provider, you can also send messages to your care team and make appointments. If you have questions, please call your primary care clinic.  If you do not have a primary care provider, please call [phone] and they will assist you.        Care EveryWhere ID     This is your Care EveryWhere ID. This could be used by other organizations to access your [place] medical records  [id number]        Your Vitals Were     Pulse Temperature Respirations Pulse Oximetry          94 98.1  F (36.7  C) (Tympanic) 16 99%         Blood Pressure from Last 3 Encounters:   12/28/17 125/58   12/14/17 112/79   11/17/17 124/80    Weight from Last 3 Encounters:   12/14/17 65.1 kg (143 lb 9.6 oz)   11/17/17 64 kg (141 lb 1.6 oz)   10/20/17 64.7 kg (142 lb 9.6 oz)              We Performed the Following     CBC with platelets differential     Magnesium     Potassium          Today's Medication Changes          These changes are accurate as of: 12/28/17 10:50 AM.  If you have any questions, ask your nurse or doctor.               These medicines have changed or have updated prescriptions.        Dose/Directions    magnesium oxide 400 MG tablet   Commonly known as:  MAG-OX   This may have changed:  when to take this   Used for:  Ovarian cancer, unspecified laterality (H)        Dose:  400 mg   Take 1 tablet (400 mg) by mouth 2 times daily   Quantity:  90 tablet   Refills:  3                Primary Care Provider Office Phone # Fax #     Aubrie Hester [phone]       [facility] [address]  [facility] [address]        Equal Access to Services     ABIMBOLAMATEO TRISH : Felicia santana naima Walter, wajeovanny luqadaha, qamima kaalmada bel, blanka sims laAndresmarquise szymanski. So [facility] [phone].    ATENCIÓN: Si habla español, tiene a bell disposición servicios gratuitos de asistencia lingüística. Llame al [phone].    We comply with applicable federal civil rights laws and [place] laws. We do not discriminate on the basis of race, color, national origin, age, disability, sex, sexual orientation, or gender identity.            Thank you!     Thank you for choosing [facility] INFUSION SERVICES  for your care. Our goal is always to provide you with excellent care. Hearing back from our patients is one way we can continue to improve our services. Please take a few minutes to complete the written survey that you may receive in the mail after your visit with us. Thank you!             Your Updated Medication List - Protect others around you: Learn how to safely use, store and throw away your medicines at www.disposemymeds.org.          This list is accurate as of: 12/28/17 10:50 AM.  Always use your most recent med list.                   Brand Name Dispense Instructions for use Diagnosis    ASPIRIN PO      Take 325 mg by mouth daily        CALCIUM + D PO      Take 1 tablet by mouth daily.    Pelvic mass       cyanocobalamin 1000 MCG/ML injection    VITAMIN B12    1 mL    Inject 1 mL (1,000 mcg) into the muscle every 30 days    B12 deficiency       diphenoxylate-atropine 2.5-0.025 MG per tablet    LOMOTIL    60 tablet    Take 2 tablets by mouth 4 times daily as needed for diarrhea    Acute diarrhea       Ferrous Sulfate 324 (65 FE) MG Tbec     90 tablet    TAKE 1 TABLET BY MOUTH 3 TIMES DAILY WITH MEALS. TAKE WITH A SMALL AMOUNT OF ORANGE JUICE. DO NOT TAKE WITH CALCIUM.    Ovarian  cancer, right (H), Anemia, unspecified type, Ovarian cancer, left (H)       HERBALS      daily        iohexol 140 MG/ML Soln solution    OMNIPAQUE    140 mL    Mix entire bottle (50ml) of contast with 600ml (20 ounces) of water and drink half 2 hrs prior to CT scan and half 1 hr prior to scan    Ovarian cancer, right (H), Metastatic cancer (H)       LEVOTHYROXINE SODIUM PO      Take by mouth daily        LORazepam 1 MG tablet    ATIVAN    30 tablet    Take 1 tablet (1 mg) by mouth every 6 hours as needed (Anxiety, Nausea/Vomiting or Sleep)    Ovarian cancer, unspecified laterality (H)       magnesium oxide 400 MG tablet    MAG-OX    90 tablet    Take 1 tablet (400 mg) by mouth 2 times daily    Ovarian cancer, unspecified laterality (H)       METFORMIN HCL PO      Take 500 mg by mouth daily        order for DME     3 each    Injection Supplies for Vitamin B12: 3cc syringes w/ 27 gauge needles, 1/2 inch length    B12 deficiency       potassium chloride 20 MEQ Packet    KLOR-CON     Take 20 mEq by mouth daily        prochlorperazine 10 MG tablet    COMPAZINE    30 tablet    Take 1 tablet (10 mg) by mouth every 6 hours as needed (nausea/vomiting)    Encounter for long-term (current) use of medications, Ovarian cancer, right (H), Ovarian cancer, left (H), Drug-induced neutropenia (H)       sulfamethoxazole-trimethoprim 800-160 MG per tablet    BACTRIM DS/SEPTRA DS    14 tablet    Take 1 tablet by mouth 2 times daily    Dysuria       VITAMIN C PO      Take 500 mg by mouth daily    Pelvic mass       VITAMIN D PO      Take 1,000 Units by mouth daily Unknown dose        VITAMIN E NATURAL PO      Take 100 Units by mouth daily

## 2017-12-28 NOTE — PROGRESS NOTES
SPIRITUAL HEALTH SERVICES Progress Note  [facility]    Visited pt Odalys per RN referral.  Odalys's  Marcos was present.  Oriented them to  services and provided contact information.  Odalys reported that she has good support from her , family, and Congregation.  She is [employer] and is affiliated with the [facility] [employer].  The elders of her Congregation are aware of her cancer treatment.  Odalys acknowledged the availability of  support but expressed no needs at this time.    Plan: I remain available per pt/family request.    Armando Ahn M.Div., [facility]  Staff   Pager [phone]

## 2018-01-11 ASSESSMENT — ENCOUNTER SYMPTOMS
HEMOPTYSIS: 0
ORTHOPNEA: 0
SPUTUM PRODUCTION: 0
BOWEL INCONTINENCE: 0
TINGLING: 0
RECTAL PAIN: 0
BLOATING: 0
HEARTBURN: 0
MEMORY LOSS: 0
JOINT SWELLING: 0
DIARRHEA: 1
POSTURAL DYSPNEA: 0
COUGH: 0
FEVER: 0
DOUBLE VISION: 0
HYPOTENSION: 0
SLEEP DISTURBANCES DUE TO BREATHING: 0
STIFFNESS: 0
ABDOMINAL PAIN: 0
DECREASED CONCENTRATION: 0
EYE PAIN: 0
WEIGHT GAIN: 0
DEPRESSION: 0
RESPIRATORY PAIN: 0
MYALGIAS: 0
EYE IRRITATION: 0
POLYPHAGIA: 0
SHORTNESS OF BREATH: 0
MUSCLE CRAMPS: 0
WEAKNESS: 0
DYSPNEA ON EXERTION: 0
HALLUCINATIONS: 0
BLOOD IN STOOL: 0
PALPITATIONS: 0
INSOMNIA: 0
VOMITING: 0
DIZZINESS: 0
HYPERTENSION: 0
NIGHT SWEATS: 0
HEADACHES: 0
DISTURBANCES IN COORDINATION: 0
SEIZURES: 0
BRUISES/BLEEDS EASILY: 0
NUMBNESS: 0
NECK PAIN: 0
DYSURIA: 0
DECREASED LIBIDO: 0
LEG PAIN: 0
HOARSE VOICE: 0
PARALYSIS: 0
DIFFICULTY URINATING: 0
TACHYCARDIA: 0
INCREASED ENERGY: 0
NAIL CHANGES: 0
NAUSEA: 0
WEIGHT LOSS: 0
COUGH DISTURBING SLEEP: 0
SKIN CHANGES: 0
BREAST MASS: 0
DECREASED APPETITE: 0
SNORES LOUDLY: 0
PANIC: 0
MUSCLE WEAKNESS: 0
EXTREMITY NUMBNESS: 0
EYE WATERING: 0
CHILLS: 0
LOSS OF CONSCIOUSNESS: 0
TREMORS: 0
BREAST PAIN: 0
JAUNDICE: 0
SWOLLEN GLANDS: 0
POLYDIPSIA: 0
CLAUDICATION: 0
NECK MASS: 0
TROUBLE SWALLOWING: 0
POOR WOUND HEALING: 0
LEG SWELLING: 0
EXERCISE INTOLERANCE: 0
CONSTIPATION: 0
WHEEZING: 0
NERVOUS/ANXIOUS: 0
FLANK PAIN: 0
SMELL DISTURBANCE: 0
SYNCOPE: 0
RECTAL BLEEDING: 0
LIGHT-HEADEDNESS: 0
SINUS CONGESTION: 0
SINUS PAIN: 0
ALTERED TEMPERATURE REGULATION: 0
EYE REDNESS: 0
HEMATURIA: 1
SORE THROAT: 0
TASTE DISTURBANCE: 0
HOT FLASHES: 0
SPEECH CHANGE: 0
ARTHRALGIAS: 0
BACK PAIN: 0

## 2018-01-11 NOTE — PROGRESS NOTES
Gynecologic Oncology Follow-Up Note  RE: Odalys Nathan  MRN: [id number]  : 1960  Date of Visit: 2018    CC: Odalys Nathan is a 57 year old year old female with recurrent stage IIIC bilateral ovarian cancer who presents today for disease management with Doxil.    HPI: Odalys comes to the clinic accompanied by her  Marcos. She is feeling well today but reports this past cycle was more difficult for her. She had nausea and vomiting the night of her last chemotherapy which she has never had before. Feels her fatigue continues to worsen but is able to carry out ADLs and travel. Takes magnesium once daily when she feels she can tolerate it- develops diarrhea with this. She continues on ferrous sulfate 325mg TID for history of anemia. She also continues tinnitus- denies need for intervention. Has had many cycles of carboplatin, no history of reaction thus far. Developed hematuria last week which resolved with increasing her fluid intake, currently asymptomatic. No mucosal issues with Doxil. She is eating and drinking well, reports she is ready for chemotherapy today.      Brief Oncology History:  2012 - Admitted to hospital for 2 weeks of intermittent abdominal cramping, distention, diarrhea and N/V. CT of abdomen/pelvis significant for small bowel obstruction, a heterogenous soft tissue density in the pelvis, omental nodules, and ascites. Bilateral adnexal masses per U/S of pelvis. CA-125 was elevated at 987, CEA was normal at 1.0.    12 - Therapeutic paracentesis (4 L) with cytology confirming malignancy (VA positive, weak ER positive, CK-7 positive consistent with GYN primary). Surgery recommended d/t potential for falling blood counts 2/2 chemotherapy (patient is [employer] and limiting blood transfusion)    12 - Exploratory laparotomy, MAR BSO, lysis of adhesion, Appendectomy, Repair cystotomy, Omentectomy Fxvs-hdauhf-mtecugqyk-transverse-colon resection,  Ileo-descending colon anastomosis, CUSA, & Colonoscopy (done by Dr. Amato and colorectal team).  2/20/2012 - Admitted to hospital for bilateral pulmonary emboli and drainage of pleural effusion. Started on Lovenox.  2/28/12-3/21/12: Cycle #1-2 Carbo/Taxol IV  3/29/12 - Started on Keflex by her PCP for infection in her healing wound (immediately below her umbilicus).    4/11/12-6/14/12: Cycle #3-6 IV chemo, Cycle #1 IV/IP chemo  7/16/12 -  8, CT PRADEEP - enrolled in  - observation arm  3/4/13-6/28/13:  6, 9, 12, 15, 20.  7/1/13: CT Chest, Abdomen, Pelvis IMPRESSION:  1. Worsening metastatic ovarian carcinoma suggested by increased size of soft tissue nodules anterior to the right psoas muscle, that may represent growing mesenteric lymphadenopathy.  2. Remaining prominent right lower quadrant mesenteric lymph nodes are not significantly changed from CT 7/16/2012.  3. Clustered nodular opacities in the right lower lobe are not significant change from 7/16/2012 and remain indeterminate. Again, the appearance and distribution is suggestive of an infectious etiology.  4. Stable 8 mm soft tissue nodule in left breast, unchanged since at least 02/20/2012. This can be observed on followup studies, but correlation with mammography could be considered.  Decision made to start Doxil/Carbo.  7/11/13: The left ventricular ejection fraction is normal at 66.4%.  7/12/13-9/6/13: Cycle #1-3 Doxil/Carbo.  10, 11.    9/30/13 CT C/A/P Impression:  1. Overall, favorable response to treatment with decreasing size of soft tissue nodules tracking along the anterior aspect of the right psoas muscle.  2. 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  3. Improved cluster of right lower lobe pulmonary nodular opacities. These may represent resolving infection.  10/3/13-12/9/13:  9, 8, 10. Cycle 4-6 Doxil/Carbo.    1/13/14 CT C/A/P Impression:   1. Stable appearance of metastatic ovarian cancer. Scattered soft  tissue nodules along the anterior aspect of the right psoas muscle are unchanged in size. Mild mesenteric lymphadenopathy is unchanged.  2. Clustered micronodules in the right lower lobe are unchanged from 9/30/2013, but improved from 7/1/2013. This history suggests a postinflammatory/postinfectious etiology.  3. 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  1/16/14 Discussed multiple options for her based on relatively stable-appearing disease on CT but slight increase in  (which has had small increase to 20 with last recurrence) including chemo break with recheck of  in 1 month, starting new chemo agent immediately, and exploratory surgery with possible resection of nodules. She is considered platinum-sensitive based on > 1 year remission after Taxol/Carbo, which we will take into consideration for future chemo planning. I am not inclined to surgery at this time given difficulty she already has with diarrhea secondary to past colon resection. I suspect we would need to resect further bowel due to mesenteric disease. Also explained inherent risks of any major surgery. Also mentioned maintenance chemo, but this has not been shown to increase overall survival and would likely decrease her quality of life without significant benefit. Family is going on vacation to [place] in 2 weeks and Odalys does not want to have chemo prior to that, so will plan to take 1 month break. She can have  at that time (discussed checking toady since last draw was in early December, but as it would likely not change treatment plan and she has h/o slow rising , will not check today).  2/17/14  16    2/20/14: Decision to take break from chemo for two months, followed by CT and CA-125.    4/21/14  27  4/21/14 CT C/A/P Impression:    1. Increased size of 2 low-attenuation lymph nodes anterior to the right psoas muscle is concerning for worsening metastatic ovarian cancer.    2. New circumferential  "thickening of a 3.8 cm length segment of distal transverse colon is likely physiologic. Recommend attention on followup imaging.    3. Grossly unchanged size of clustered small nodules versus scarring in the right lower lobe the lungs.    4. Stable thrombosis of the right ovarian vein.  5/14/14: Diagnostic laparoscopy converted to exploratory laparotomy and removal of mesenteric masses, tumor debulking, peritoneal biopsies and intraperitoneal port placement. On laparoscopy, it was noted that there were small nodules on the anterior abdominal wall near the previous incision, small nodules on the right pelvic sidewall as well. Nodules were palpated in the mesentery; however, as it was unable to clarify where the origin of the nodules was, the decision was made to open the patient. On opening there was found to be approximately a 3 cm nodule in the small bowel mesentery and another separate approximately 2 cm nodule in the bowel mesentery. Pelvis without evidence of cancer, some mesenteric lymph nodes were palpated. No evidence otherwise of any disseminated cancer throughout the abdomen.    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 (2/2)  -Largest metastasis: 1.3 cm  -See comment  D: Peritoneum, right paracolic gutter #2, biopsy:  -Fibroadipose tissue with granulomatous inflammation surrounding refractile material  -Negative for malignancy   E: Small bowel adhesion, biopsy:  -Fibroconnective tissue, consistent with adhesion  -Negative for malignancy  F: Lymph nodes, mesentry, not otherwise specified, excision:  -Two lymph nodes, negative for metastatic carcinoma (0/2)  G: " "Lymph node, mesentery, \"#2\", excision:  -One lymph node, negative for metastatic carcinoma (0/1)  H: Lymph nodes, mesentery, \"nodule #2\", excision:  -Five lymph nodes, negative for metastatic carcinoma (0/5)  COMMENT:  Some of the specimens show post-operative changes. Others show possible treatment related changes, including necrosis. The metastatic carcinoma in the mesenteric lymph nodes (specimen C) shows variable morphology, including relatively low grade tumor with papillary architecture, and high grade tumor comprised of nests of tumor cells with irregular, slit-like spaces and marked nuclear pleomorphism.    5/29/14: Cycle 1 IV PACLitaxel / IP CISplatin / IP PACLitaxel.  - 28.  6/26/14: Cycle #2 IV/IP.  10  8/5/14: CT chest/abd/pelvis IMPRESSION     1. In this patient with ovarian cancer, overall findings are indicative of stable/slight improvement, as multiple mesenteric lymphadenopathy and scattered nodular peritoneal soft tissue mass lesions appear unchanged or slightly smaller since 4/21/2014.    2. 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 Mild dilatation of the second and the third portion of the duodenum with a narrow SMA angle. This could represent SMA syndrome, if clinically correlated.17/14:    Cycle #3 IV/IP.  10.    CT chest/abd/pelvis with contrast on 8/5/14    Impression:    1. In this patient with ovarian cancer, overall findings are indicative of stable/slight improvement, as multiple mesenteric lymphadenopathy and scattered nodular peritoneal soft tissue mass lesions appear unchanged or slightly smaller since 4/21/2014.    2. 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 Mild dilatation of the second and the third portion of the duodenum with a narrow SMA angle. This could represent SMA syndrome, if clinically correlated.  8/7/14: Cycle #4 Taxol/Carbo (changed from IV/IP).  10. She has been feeling okay. She is unsure if she can finish " out the course of 6 cycles IV/IP taxol/cisplatin. She feels like she has the flu for about a week then starts feeling gradually better after each chemo cycle. Her spouse notes that she actually has been more sick with the treatments than she initially admits here. She was also previously having some rib pain. Denies any rib pain now. Denies any chest pain or shortness of breath.  Plan: discussed recent CT cap results and switching to just IV as she is feeling miserable with IP treatments. Switch to IV carbo/taxol as patient is platinum sensitive.  We also discussed her taking part of the tesaro trial, which would require BRCA testing. She would like to take part in this trial if eligible.  8/20/14: Remove Intraperitoneal Port ( Port and catheter intact - discarded)  8/28/14: Cycle #5 Taxol/Carbo held due to thrombocytopenia.  6.    She denies any vaginal bleeding, no changes in her bowel or bladder habits, no nausea/emesis, no lower extremity edema, and no difficulties eating or sleeping. She denies any abdominal discomfort/bloating, no fevers or chills, and no chest pain or shortness of breath. She states her diarrhea is the same. She reports some fatigue which improves about 1-2 weeks after her chemotherapy. She states she does not need any medication refills and she was told she does not meet the criteria for the TESARO trial. She states she has 3 bags of iv fluids left over from her previous chemotherapy and will give these to herself. She states she is ready for her treatment today.    9/29/14: Cycle #6 Taxol/Carbo  6. Insurance questions regarding GSF coverage today. No concerns other than fatigue. Taking iron for anemia and does not desire blood transfusion. Using neulasta for neutropenia. Using home IV hydration if needed. Baseline unchanged. No abdominal bloating, constipation, diarrhea, pain, vaginal or rectal bleeding, cough or dyspnea, fluid retention.    10/16/14: Impression:    1. Nodular  peritoneal soft tissue mass in the right lower quadrant adjacent to the psoas muscle is no longer appreciated. Adjacent prominent lymphadenopathy is unchanged from previous exam. No new peritoneal lesions.    2. 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    10/20/14:  5. CT chest/abdomen/pelvis on 10/16/14 showed nodular peritoneal soft tissue mass in the right lower quadrant adjacent to the psoas muscle is no longer appreciated. Adjacent prominent lymphadenopathy is unchanged from previous exam. 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  1/27/15:  6.  4/28/15:  14.  5/26/15:  18.  6/2/15: CT cap Impression:  1. Postsurgical changes of hysterectomy and bilateral salpingo-oophorectomy for ovarian cancer. There is a new 8 mm hazy, ill-defined hypoattenuating lesion in hepatic segment 6 which is suspicious for a metastatic deposit. Further evaluation with ultrasound in recommended.    2. Increased size of a left retroperitoneal lymph node which is indeterminate but may represent a ciro metastasis. Mildly prominent lymph nodes in the right lower quadrant are not significantly changed.  3. Moderate colonic stool burden.    6/4/15: US abdomen IMPRESSION:    Hyperechoic lesion in the right hepatic lobe, consistent with hemangioma. This does not corresponds to the area of the lesion seen on CT from 6/2/2015. An MR would be helpful for identifying and characterizing the lesion from the recent CT.  6/12/15: MR abd IMPRESSION:  1. New 20 x 11 mm enhancing lesions between the right obliques, concerning for metastatic disease. This lesions should be amenable to percutaneous biopsy, if indicated.  2. Correlating to the lesion visualized on comparison CT is a hepatic segment 6 subcapsular 7 mm lesion. Overall the appearance favors the diagnosis of a simple cyst. However, there is faint suggestion of mild peripheral arterial enhancement. Although this  is favored as  artifactual, this should be followed up to confirm stability. Recommend 6 month followup.  3. Hepatic segment 6, 5 mm lesion too small to technically characterize. Differential would favor FNH, less likely flash filling hemangioma. Recommend attention on followup.  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     9/1/15: Cycle #1 Avastin/Cytoxan.   31.     9/24/15:feeling generally well. She says she has been having back and stomach spasms. She is taking cytosine daily (she ran out yesterday). She also says its affecting her voice. Admits that it burns occasionally. She is eating and drinking normal. She also admit diarrhea, 5-7 times daily, lose/watery. She trying to stay hydrated and eat fiber. She also says that her body is sore, especially the bottom of her feet. Her blood pressure is normal. She also admits having headache after her first infusion.      9/24/15: Cycle #2 Avastin/Cytoxan.  19.  10/15/15: Cycle #3 Avastin/Cytoxan.  16.     11/6/15: CT c/a/p IMPRESSION:    1. Stable postoperative change of MAR/BSO for ovarian cancer.  2. The lesion in the right flank abdominal musculature is slightly decreased in size. Otherwise, stable examination.  2. No evidence of metastatic disease in the chest.    11/20/15: Treatment planning visit,  16    11/25/15: surgical pathology report  FINAL DIAGNOSIS:  Soft tissue, right oblique muscle mass, excision:  -Recurrent ovarian serous carcinoma  -Carcinoma is present less than 1 mm from one resection margin  -Background skeletal muscle and fibroadipose tissue    1/4/16:  22  1/11/16-1/27/16: Radiation to right flank x 12 treatments  4/7/2016:  94. CT cap IMPRESSION:    In this patient with ovarian cancer status post MAR/BSO and descending/transverse colectomy:  1. No evidence for malignancy in  the chest, abdomen, or pelvis.  2. Stable small hypodense segment 6 liver lesion, appears more likely benign, possibly a cyst.  5/13/16:  124.  6/3/16: PET CT IMPRESSION: In this patient with a history of ovarian cancer:  1. Hypermetabolic and enlarging periaortic and perihepatic lymphadenopathy compatible with metastatic disease, as detailed above.  2. Although hypodense lesion in hepatic segment 6 has been present since 6/2/2015 associated hypermetabolism makes this lesion highly concerning for metastatic disease.    Plan: to start Niraparib under TESARO study.     6/9/16:  137.  6/14/16: Cycle #1 Niraparib.    6/28/16: Cycle #1 D15 Niraparib.    7/5/16:  100.    7/11/16: Cycle #2 Nraparib.   83.    8/3/2016: PET CT IMPRESSION:    In this patient with known history of ovarian cancer:  1) New pleural based nodular opacities in the lateral and inferior aspects of the bilateral lower lobes, worse in the left lung. Likely infection. Close follow up is recommended.      2) Slight decrease in hypermetabolic abdominal lymphadenopathy. 2 hypermetabolic lymph nodes persist.  3) Unchanged right hepatic lobe metastatic lesion.     8/9/16: Cycle #3 Niraparib.  69.  9/6/16: Cycle #4 Niraparib.  53. Dose held due to anemia.  9/13/16: Eval for potential cycle 4 niraparib. Dose held due to anemia.  10/4/16: CT CAP impression:  IMPRESSION: In this patient with a known history of ovarian cancer:  1. There has been interval resolution of pleural-based nodular  opacities which likely represented infection.  2. Abdominal lymphadenopathy in the form of 2 portacaval lymph nodes  have not significantly changed in size, noted to be hypermetabolic on  prior PET/CT.  3. Previously demonstrated metastatic lesion in the right lobe of the  liver is not significantly changed.  10/11/16:  60  11/1/16: C6 niraparib,  75  11/29/16: C7 niraparib.  78. CT CAP impression as follows:  Target  lesions (RECIST criteria):       A previously described target lesion superior to the head of the  pancreas (series 2, image 64)  (referred to as a perihepatic node on  6/3/2016) may not be a valid target lesion because it measured less  than 1.5 cm originally. However, this particular node has decreased in  size, now measuring 7 mm in short axis versus 14 mm on 6/3/2016 when  measured in a similar fashion.       2.3 cm short axis portacaval lymph node on series 2 image 67,  previously 2.0 cm on 10/4/2016.       1.2 cm subtle hypodensity in hepatic segment 6 on series 2 image 75,  stable on multiple studies since at least 6/3/2016     Sum of diameters today: 3.5 cm. Sum of diameters 10/4/2016: 3.2 cm.  Growth = 9%.    12/27/16: C8 niraparib.  105.  1/25/17: C9 niraparib.  108.  2/23/17: C10 niraparib.  132.   CT CAP impression:  Sum of target lesion diameters today: 3.7 cm. Sum of target lesion  diameters on 11/28/2016: 3.5 cm. Growth= 6%  1. In this patient with history of ovarian cancer there is stable  disease by RECIST criteria as evidenced by:   1a. Mildly increased size of liver metastasis.  1b. Stable portacaval lymphadenopathy.  1c. No evidence of metastatic disease in the chest.  2. Trace emphysematous changes of the lungs.  3/22/17: C11 niraparib.  132.  4/19/17: C12 niraparib.  127.  5/16/17: CT CAP IMPRESSION: In this patient with ovarian cancer:  1. Mildly increased size of hepatic metastasis segment 6 with subtle increased capsular retraction.  2. Stable edmundo hepatis nodes, with mild increase in size of periaortic lymph nodes.  3. Prominent left supraclavicular lymph node with subtle increase in size compared to prior studies, particularly comparing to 10/4/2016.  4. Mild subtle groundglass opacities in the right upper lobe, not present on prior study, lesser extent in the right lower lobe.  Findings may represent infection, additional consideration is malignancy (less  likely), and attention on follow-up study  Recommended.  Addendum:   Prominent left supraclavicular lymph node (3/25) is stable from most recent CT performed 2/20/2017, currently measuring 14 x 16 mm, previously 14 x 16 mm on 2/20/2017.      The portal caval lymph node/edmundo hepatis lymph node is stable from 2/20/2017, measuring 21 mm.      Clarification of size of the para-aortic lymph node (series 3 image 327). It short axis measurement is 11 mm versus 9 mm on prior study.      Hepatic segment 6 triangular-shaped low density lesion (3/362) measures 14 mm, previously measured 12 mm, demonstrating a  possible/questionable minimal subtle increase in size. Similarly the lymph nodes noted above in the supraclavicular and para-aortic regions demonstrate possible minimal possible subtle increase in size.      5/18/17: Cycle #13 Niraparib.  191.  6/15/17: Cycle #14 Niraparib.  146.  7/13/17: Cycle #15 Niraparib 200 mg.  171     CT (8/9/17):       IMPRESSION:  1. Segment 6 hepatic metastasis is stable to minimally increased in  size.  2. Slight increase in multicentric adenopathy, most pronounced at the  edmundo hepatis. Additional sites include the inferior left  neck/supraclavicular region and retroperitoneum which appear stable to  minimally increased.      8/10/17:  175  9/14/17: MUGA LVEF 54%  9/22/17: C1D1 carboplatin/Doxil.  187.  10/19/17: C2D1 carboplatin/Doxil.  108.  11/17/17: C3D1 carboplatin/Doxil.  82.  12/14/17: C4D1 carboplatin/Doxil.  83.  1/12/18: C5D1 carboplatin/Doxil.  pending.    Past Medical History:   Diagnosis Date     Antiplatelet or antithrombotic long-term use      Ascites      Blood clot in the legs      Diabetes (H)      Ovarian cancer (H)     serous,stg IV     Pleural effusion      Pulmonary embolism (H) 2/2012     Refusal of blood transfusions as patient is [employer]      Short gut syndrome      Subclinical hypothyroidism 4/18/2013      Thrombosis of leg        Past Surgical History:   Procedure Laterality Date     COLECTOMY       COLONOSCOPY  2/1/2012    Procedure:COLONOSCOPY; With Biopsy; Surgeon:TONI SULLIVAN; Location:[facility]     HYSTERECTOMY TOTAL ABD, RHIANNA SALPINGO-OOPHORECTOMY, NODE DISSECTION, TUMOR DEBULKING, COMBINED  2/1/2012    Procedure:COMBINED HYSTERECTOMY TOTAL ABDOMINAL, BILATERAL SALPINGO-OOPHORECTOMY, NODE DISSECTION, TUMOR DEBULKING;  Exploratory Laparotomy, Total Abdominal Hysterectomy, Bilateral Salpingo-Oophorectomy, appendectomy,lysis of adhesions, ileal, ascending, transverse and splenic flexure resection, ileal descending bowel renanastomosis, incidental cystotomy repair, CUSA procedure and colonoscopy ; [facility]     INSERT PORT PERITONEAL ACCESS  4/3/2012    Procedure:INSERT PORT PERITONEAL ACCESS; Intraperitoneal Port Placement (c-arm); Surgeon:SAMUEL CARRASCO; Location:[facility]     INSERT PORT PERITONEAL ACCESS  5/14/2014    Procedure: INSERT PORT PERITONEAL ACCESS;  Surgeon: Nga Yeung MD;  Location: [facility] OR     INSERT PORT VASCULAR ACCESS       LAPAROSCOPY DIAGNOSTIC (GYN)  5/14/2014    Procedure: LAPAROSCOPY DIAGNOSTIC (GYN);  Surgeon: Nga Yeung MD;  Location: [facility]     LAPAROTOMY EXPLORATORY Right 11/25/2015    Procedure: LAPAROTOMY EXPLORATORY;  Surgeon: Nga Yeung MD;  Location: [facility]     LAPAROTOMY, TUMOR DEBULKING, COMBINED  5/14/2014    Procedure: COMBINED LAPAROTOMY, TUMOR DEBULKING;  Surgeon: Nga Yeung MD;  Location: [facility]     REMOVE CATHETER PERITONEAL N/A 8/20/2014    Procedure: REMOVE CATHETER PERITONEAL;  Surgeon: Nga Yeung MD;  Location: [facility]     VASCULAR SURGERY      stent left iliac vein       Current Outpatient Prescriptions   Medication     iohexol (OMNIPAQUE) 140 MG/ML SOLN solution     sulfamethoxazole-trimethoprim (BACTRIM DS/SEPTRA DS) 800-160 MG per tablet     Ferrous Sulfate 324 (65 FE) MG TBEC     LORazepam (ATIVAN) 1 MG tablet      prochlorperazine (COMPAZINE) 10 MG tablet     LEVOTHYROXINE SODIUM PO     order for DME     METFORMIN HCL PO     diphenoxylate-atropine (LOMOTIL) 2.5-0.025 MG per tablet     cyanocobalamin (VITAMIN B12) 1000 MCG/ML injection     magnesium oxide (MAG-OX) 400 MG tablet     ASPIRIN PO     potassium chloride (KLOR-CON) 20 MEQ packet     VITAMIN E NATURAL PO     Cholecalciferol (VITAMIN D PO)     HERBALS     Ascorbic Acid (VITAMIN C PO)     Calcium Carbonate-Vitamin D (CALCIUM + D PO)     No current facility-administered medications for this visit.      Facility-Administered Medications Ordered in Other Visits   Medication     heparin 100 UNIT/ML injection 500 Units          No Known Allergies    Family History   Problem Relation Age of Onset     CANCER Mother 69     lung, smoker     CANCER Maternal Uncle 65     brain     Colon Cancer Maternal Aunt 80     colon       Social History     Social History     Marital status:      Spouse name: N/A     Number of children: N/A     Years of education: N/A     Occupational History     Not on file.     Social History Main Topics     Smoking status: Former Smoker     Years: 8.00     Types: Cigarettes     Quit date: 6/21/1980     Smokeless tobacco: Never Used      Comment: started at 9 yo and quit at 18 yo     Alcohol use Yes      Comment: 3x/day wine or jodi     Drug use: No     Sexual activity: Not on file     Other Topics Concern     Not on file     Social History Narrative       Review of Systems     Constitutional:  Positive for fatigue. Negative for fever, chills, weight loss, weight gain, decreased appetite, night sweats, recent stressors, height gain, height loss, post-operative complications, incisional pain, hallucinations, increased energy, hyperactivity and confused.   HENT:  Positive for tinnitus. Negative for ear pain, hearing loss, nosebleeds, trouble swallowing, hoarse voice, mouth sores, sore throat, ear discharge, tooth pain, gum tenderness, taste  disturbance, smell disturbance, hearing aid, bleeding gums, dry mouth, sinus pain, sinus congestion and neck mass.    Eyes:  Negative for double vision, pain, redness, eye pain, decreased vision, eye watering, eye bulging, eye dryness, flashing lights, spots, floaters, strabismus, tunnel vision, jaundice and eye irritation.   Respiratory:   Negative for cough, hemoptysis, sputum production, shortness of breath, wheezing, sleep disturbances due to breathing, snores loudly, respiratory pain, dyspnea on exertion, cough disturbing sleep and postural dyspnea.    Cardiovascular:  Negative for chest pain, dyspnea on exertion, palpitations, orthopnea, claudication, leg swelling, fingers/toes turn blue, hypertension, hypotension, syncope, history of heart murmur, chest pain on exertion, chest pain at rest, pacemaker, few scattered varicosities, leg pain, sleep disturbances due to breathing, tachycardia, light-headedness, exercise intolerance and edema.   Gastrointestinal:  Positive for diarrhea. Negative for heartburn, nausea, vomiting, abdominal pain, constipation, blood in stool, melena, rectal pain, bloating, hemorrhoids, bowel incontinence, jaundice, rectal bleeding, coffee ground emesis and change in stool.   Genitourinary:  Positive for hematuria. Negative for bladder incontinence, dysuria, urgency, flank pain, vaginal discharge, difficulty urinating, genital sores, dyspareunia, decreased libido, nocturia, voiding less frequently, arousal difficulty, abnormal vaginal bleeding, excessive menstruation, menstrual changes, hot flashes, vaginal dryness and postmenopausal bleeding.   Musculoskeletal:  Negative for myalgias, back pain, joint swelling, arthralgias, stiffness, muscle cramps, neck pain, bone pain, muscle weakness and fracture.   Skin:  Negative for nail changes, itching, poor wound healing, rash, hair changes, skin changes, acne, warts, poor wound healing, scarring, flaky skin, Raynaud's phenomenon, sensitivity  to sunlight and skin thickening.   Neurological:  Negative for dizziness, tingling, tremors, speech change, seizures, loss of consciousness, weakness, light-headedness, numbness, headaches, disturbances in coordination, extremity numbness, memory loss, difficulty walking and paralysis.   Endo/Heme:  Negative for anemia, swollen glands and bruises/bleeds easily.   Psychiatric/Behavioral:  Negative for depression, hallucinations, memory loss, decreased concentration, mood swings and panic attacks.    Breast:  Negative for breast discharge, breast mass, breast pain and nipple retraction.   Endocrine:  Negative for altered temperature regulation, polyphagia, polydipsia, unwanted hair growth and change in facial hair.        Physical Exam:    /57 (BP Location: Right arm, Cuff Size: Adult Regular)  Pulse 89  Temp 98.9  F (37.2  C) (Oral)  Resp 16  Wt 64.4 kg (142 lb)  SpO2 95%  BMI 23.63 kg/m2    General: Alert non-toxic appearing female in no acute distress  HEENT: Normocephalic, atraumatic; PERRLA; no scleral icterus; oropharynx pink without lesions; neck supple  Pulmonary: Lungs clear to auscultation, no increased work of breathing noted  Cardiac: Regular rate and rhythm, S1S2, no clicks, murmurs, rubs, or gallops; bilateral lower extremities without edema  GI: Normoactive bowel sounds x4 quadrants, abdomen soft, non-distended, and non-tender to palpation without masses or organomegaly  : Not indicated  Heme: Cervical, supraclavicular, and inguinal nodes without lymphadenopathy  MSK: Moves all extremities, no obvious muscle wasting  Neuro: No gross deficits, normal gait  Skin: Appropriate color for race, warm and dry, no rashes or lesions to unclothed skin; no palmar plantar erythrodysesthesia  Psych: Pleasant and interactive, affect bright, makes appropriate eye contact, thought process linear    Labs:      1/12/2018  Day 1   Hemoglobin 11.7 - 15.7 g/dL 10.6 (A)   Hematocrit 35.0 - 47.0 % 31.7 (A)    Platelet Count 150 - 450 10e9/L 163   Absolute Neutrophil 1.6 - 8.3 10e9/L 2.0   Sodium 133 - 144 mmol/L 138   Potassium 3.4 - 5.3 mmol/L 3.6   Chloride 94 - 109 mmol/L 104   Carbon Dioxide 20 - 32 mmol/L 25   Urea Nitrogen 7 - 30 mg/dL 14   Creatinine 0.52 - 1.04 mg/dL 0.72   Calcium 8.5 - 10.1 mg/dL 8.7   Magnesium 1.6 - 2.3 mg/dL 1.4 (A)   Bilirubin Total 0.2 - 1.3 mg/dL 0.4   ALT 0 - 50 U/L 30   AST 0 - 45 U/L 27   Alkaline Phosphatase 40 - 150 U/L 122   Albumin 3.4 - 5.0 g/dL 3.3 (A)   Protein Total 6.8 - 8.8 g/dL 7.4   WBC 4.0 - 11.0 10e9/L 4.9    pending      Assessment/Plan:  1) Recurrent stage IIIC bilateral ovarian cancer: Currently feeling well other than fatigue. Tolerating chemotherapy without dose limiting side effects, no apparent palmar plantar erythrodysesthesia. Proceed with C5D1 carboplatin/Doxil as ordered by Dr. Yeung. Avastin being added D1 and D15 per Dr. Yeung. To have CT imaging and treatment planning after cycle six with Dr. Riggins during Dr. Yeung's absence. Upon looking over her orders and being brought to my attention by pharmacy, it appears that she did not receive Zofran with her last cycle which could have contributed to her nausea on D1. Will add in Zofran 8mg IV as a pre-med. Continue with energy conservation and periods of rest/activity for fatigue management. IV magnesium replacement today- continue mag ox 400mg PO once daily and to increase dietary sources of magnesium. Urinary sxs resolved, but UA with reflex to UC placed as standing order should this recur during treatment. Reviewed risks of carboplatin reaction and s/sxs reaction and when to call her nurse/seek further care. Reviewed risks with Avastin including bleeding and GI perforation. Reviewed signs and symptoms for when she should contact the clinic or seek additional care, including but not limited to fever, chills, inability to keep down food or fluids, nausea and vomiting not controlled with  antiemetics, and diarrhea leading to dehydration. Patient to contact the clinic with any questions or concerns in the interim.   2) Genetic counseling: Testing has been performed and she is negative for mutations in BRCA1, BRCA2, EPCAM, MLH1, MSH2, MSH6, PMS2, PTEN, and TP53 genes  3) Health maintenance issues discussed include to continue following up with PCP for non-gynecologic concerns.  4) Patient verbalized understanding of and agreement with plan    A total of 20 minutes spent with patient, over 50% spent in counseling and coordination of care.    HAILE De Paz, FNP-C  Family Nurse Practitioner  Gynecologic Oncology  [facility]  Pager: [phone]

## 2018-01-12 ENCOUNTER — ONCOLOGY VISIT (OUTPATIENT)
Dept: ONCOLOGY | Facility: CLINIC | Age: 58
End: 2018-01-12
Attending: OBSTETRICS & GYNECOLOGY
Payer: COMMERCIAL

## 2018-01-12 ENCOUNTER — APPOINTMENT (OUTPATIENT)
Dept: LAB | Facility: CLINIC | Age: 58
End: 2018-01-12
Attending: OBSTETRICS & GYNECOLOGY
Payer: COMMERCIAL

## 2018-01-12 VITALS
BODY MASS INDEX: 23.63 KG/M2 | TEMPERATURE: 98.9 F | OXYGEN SATURATION: 95 % | RESPIRATION RATE: 16 BRPM | WEIGHT: 142 LBS | DIASTOLIC BLOOD PRESSURE: 57 MMHG | SYSTOLIC BLOOD PRESSURE: 121 MMHG | HEART RATE: 89 BPM

## 2018-01-12 DIAGNOSIS — D70.2 DRUG-INDUCED NEUTROPENIA (H): ICD-10-CM

## 2018-01-12 DIAGNOSIS — E83.42 HYPOMAGNESEMIA: ICD-10-CM

## 2018-01-12 DIAGNOSIS — C56.2 OVARIAN CANCER, LEFT (H): ICD-10-CM

## 2018-01-12 DIAGNOSIS — R30.0 DYSURIA: ICD-10-CM

## 2018-01-12 DIAGNOSIS — C56.1 OVARIAN CANCER, RIGHT (H): ICD-10-CM

## 2018-01-12 DIAGNOSIS — Z79.899 ENCOUNTER FOR LONG-TERM (CURRENT) USE OF MEDICATIONS: Primary | ICD-10-CM

## 2018-01-12 DIAGNOSIS — Z79.899 ENCOUNTER FOR LONG-TERM (CURRENT) USE OF MEDICATIONS: ICD-10-CM

## 2018-01-12 DIAGNOSIS — C56.1 OVARIAN CANCER, RIGHT (H): Primary | ICD-10-CM

## 2018-01-12 LAB
ALBUMIN SERPL-MCNC: 3.3 G/DL (ref 3.4–5)
ALBUMIN UR-MCNC: ABNORMAL MG/DL
ALP SERPL-CCNC: 122 U/L (ref 40–150)
ALT SERPL W P-5'-P-CCNC: 30 U/L (ref 0–50)
ANION GAP SERPL CALCULATED.3IONS-SCNC: 9 MMOL/L (ref 3–14)
AST SERPL W P-5'-P-CCNC: 27 U/L (ref 0–45)
BASOPHILS # BLD AUTO: 0 10E9/L (ref 0–0.2)
BASOPHILS NFR BLD AUTO: 0.2 %
BILIRUB SERPL-MCNC: 0.4 MG/DL (ref 0.2–1.3)
BUN SERPL-MCNC: 14 MG/DL (ref 7–30)
CALCIUM SERPL-MCNC: 8.7 MG/DL (ref 8.5–10.1)
CANCER AG125 SERPL-ACNC: 80 U/ML (ref 0–30)
CHLORIDE SERPL-SCNC: 104 MMOL/L (ref 94–109)
CO2 SERPL-SCNC: 25 MMOL/L (ref 20–32)
CREAT SERPL-MCNC: 0.72 MG/DL (ref 0.52–1.04)
DIFFERENTIAL METHOD BLD: ABNORMAL
EOSINOPHIL # BLD AUTO: 0.1 10E9/L (ref 0–0.7)
EOSINOPHIL NFR BLD AUTO: 1 %
ERYTHROCYTE [DISTWIDTH] IN BLOOD BY AUTOMATED COUNT: 15.2 % (ref 10–15)
GFR SERPL CREATININE-BSD FRML MDRD: 83 ML/MIN/1.7M2
GLUCOSE SERPL-MCNC: 109 MG/DL (ref 70–99)
HCT VFR BLD AUTO: 31.7 % (ref 35–47)
HGB BLD-MCNC: 10.6 G/DL (ref 11.7–15.7)
IMM GRANULOCYTES # BLD: 0.1 10E9/L (ref 0–0.4)
IMM GRANULOCYTES NFR BLD: 1 %
LYMPHOCYTES # BLD AUTO: 2 10E9/L (ref 0.8–5.3)
LYMPHOCYTES NFR BLD AUTO: 41.1 %
MAGNESIUM SERPL-MCNC: 1.4 MG/DL (ref 1.6–2.3)
MCH RBC QN AUTO: 39.8 PG (ref 26.5–33)
MCHC RBC AUTO-ENTMCNC: 33.4 G/DL (ref 31.5–36.5)
MCV RBC AUTO: 119 FL (ref 78–100)
MONOCYTES # BLD AUTO: 0.8 10E9/L (ref 0–1.3)
MONOCYTES NFR BLD AUTO: 15.5 %
NEUTROPHILS # BLD AUTO: 2 10E9/L (ref 1.6–8.3)
NEUTROPHILS NFR BLD AUTO: 41.2 %
NRBC # BLD AUTO: 0 10*3/UL
NRBC BLD AUTO-RTO: 0 /100
PLATELET # BLD AUTO: 163 10E9/L (ref 150–450)
POTASSIUM SERPL-SCNC: 3.6 MMOL/L (ref 3.4–5.3)
PROT SERPL-MCNC: 7.4 G/DL (ref 6.8–8.8)
RBC # BLD AUTO: 2.66 10E12/L (ref 3.8–5.2)
SODIUM SERPL-SCNC: 138 MMOL/L (ref 133–144)
WBC # BLD AUTO: 4.9 10E9/L (ref 4–11)

## 2018-01-12 PROCEDURE — 81003 URINALYSIS AUTO W/O SCOPE: CPT | Performed by: NURSE PRACTITIONER

## 2018-01-12 PROCEDURE — 25000128 H RX IP 250 OP 636: Mod: ZF | Performed by: OBSTETRICS & GYNECOLOGY

## 2018-01-12 PROCEDURE — 25000128 H RX IP 250 OP 636: Mod: ZF | Performed by: NURSE PRACTITIONER

## 2018-01-12 PROCEDURE — 96417 CHEMO IV INFUS EACH ADDL SEQ: CPT

## 2018-01-12 PROCEDURE — 83735 ASSAY OF MAGNESIUM: CPT | Performed by: NURSE PRACTITIONER

## 2018-01-12 PROCEDURE — 99215 OFFICE O/P EST HI 40 MIN: CPT | Mod: ZP | Performed by: NURSE PRACTITIONER

## 2018-01-12 PROCEDURE — 80053 COMPREHEN METABOLIC PANEL: CPT | Performed by: NURSE PRACTITIONER

## 2018-01-12 PROCEDURE — 96375 TX/PRO/DX INJ NEW DRUG ADDON: CPT

## 2018-01-12 PROCEDURE — 85025 COMPLETE CBC W/AUTO DIFF WBC: CPT | Performed by: NURSE PRACTITIONER

## 2018-01-12 PROCEDURE — 96413 CHEMO IV INFUSION 1 HR: CPT

## 2018-01-12 PROCEDURE — 86304 IMMUNOASSAY TUMOR CA 125: CPT | Performed by: NURSE PRACTITIONER

## 2018-01-12 RX ORDER — HEPARIN SODIUM (PORCINE) LOCK FLUSH IV SOLN 100 UNIT/ML 100 UNIT/ML
500 SOLUTION INTRAVENOUS EVERY 8 HOURS
Status: DISCONTINUED | OUTPATIENT
Start: 2018-01-12 | End: 2018-01-12 | Stop reason: HOSPADM

## 2018-01-12 RX ORDER — EPINEPHRINE 0.3 MG/.3ML
0.3 INJECTION SUBCUTANEOUS EVERY 5 MIN PRN
Status: CANCELLED | OUTPATIENT
Start: 2018-01-12

## 2018-01-12 RX ORDER — ALBUTEROL SULFATE 0.83 MG/ML
2.5 SOLUTION RESPIRATORY (INHALATION)
Status: CANCELLED | OUTPATIENT
Start: 2018-01-12

## 2018-01-12 RX ORDER — LORAZEPAM 2 MG/ML
1 INJECTION INTRAMUSCULAR EVERY 6 HOURS PRN
Status: CANCELLED
Start: 2018-01-25

## 2018-01-12 RX ORDER — ONDANSETRON 2 MG/ML
8 INJECTION INTRAMUSCULAR; INTRAVENOUS ONCE
Status: COMPLETED | OUTPATIENT
Start: 2018-01-12 | End: 2018-01-12

## 2018-01-12 RX ORDER — LORAZEPAM 2 MG/ML
1 INJECTION INTRAMUSCULAR EVERY 6 HOURS PRN
Status: CANCELLED
Start: 2018-01-12

## 2018-01-12 RX ORDER — EPINEPHRINE 1 MG/ML
0.3 INJECTION, SOLUTION, CONCENTRATE INTRAVENOUS EVERY 5 MIN PRN
Status: CANCELLED | OUTPATIENT
Start: 2018-01-25

## 2018-01-12 RX ORDER — HEPARIN SODIUM (PORCINE) LOCK FLUSH IV SOLN 100 UNIT/ML 100 UNIT/ML
5 SOLUTION INTRAVENOUS ONCE
Status: COMPLETED | OUTPATIENT
Start: 2018-01-12 | End: 2018-01-12

## 2018-01-12 RX ORDER — SODIUM CHLORIDE 9 MG/ML
1000 INJECTION, SOLUTION INTRAVENOUS CONTINUOUS PRN
Status: CANCELLED
Start: 2018-01-12

## 2018-01-12 RX ORDER — EPINEPHRINE 0.3 MG/.3ML
INJECTION SUBCUTANEOUS
Status: DISCONTINUED
Start: 2018-01-12 | End: 2018-01-12 | Stop reason: WASHOUT

## 2018-01-12 RX ORDER — MEPERIDINE HYDROCHLORIDE 25 MG/ML
25 INJECTION INTRAMUSCULAR; INTRAVENOUS; SUBCUTANEOUS EVERY 30 MIN PRN
Status: CANCELLED | OUTPATIENT
Start: 2018-01-12

## 2018-01-12 RX ORDER — DIPHENHYDRAMINE HYDROCHLORIDE 50 MG/ML
50 INJECTION INTRAMUSCULAR; INTRAVENOUS
Status: CANCELLED
Start: 2018-01-12

## 2018-01-12 RX ORDER — ALBUTEROL SULFATE 90 UG/1
1-2 AEROSOL, METERED RESPIRATORY (INHALATION)
Status: CANCELLED
Start: 2018-01-12

## 2018-01-12 RX ORDER — ALBUTEROL SULFATE 90 UG/1
1-2 AEROSOL, METERED RESPIRATORY (INHALATION)
Status: CANCELLED
Start: 2018-01-25

## 2018-01-12 RX ORDER — EPINEPHRINE 1 MG/ML
0.3 INJECTION, SOLUTION, CONCENTRATE INTRAVENOUS EVERY 5 MIN PRN
Status: CANCELLED | OUTPATIENT
Start: 2018-01-12

## 2018-01-12 RX ORDER — MAGNESIUM SULFATE HEPTAHYDRATE 40 MG/ML
2 INJECTION, SOLUTION INTRAVENOUS ONCE
Status: COMPLETED | OUTPATIENT
Start: 2018-01-12 | End: 2018-01-12

## 2018-01-12 RX ORDER — HEPARIN SODIUM (PORCINE) LOCK FLUSH IV SOLN 100 UNIT/ML 100 UNIT/ML
500 SOLUTION INTRAVENOUS EVERY 8 HOURS
Status: CANCELLED | OUTPATIENT
Start: 2018-01-12

## 2018-01-12 RX ORDER — ONDANSETRON 2 MG/ML
8 INJECTION INTRAMUSCULAR; INTRAVENOUS ONCE
Status: CANCELLED
Start: 2018-01-12 | End: 2018-01-12

## 2018-01-12 RX ORDER — METHYLPREDNISOLONE SODIUM SUCCINATE 125 MG/2ML
125 INJECTION, POWDER, LYOPHILIZED, FOR SOLUTION INTRAMUSCULAR; INTRAVENOUS
Status: CANCELLED
Start: 2018-01-25

## 2018-01-12 RX ORDER — SODIUM CHLORIDE 9 MG/ML
1000 INJECTION, SOLUTION INTRAVENOUS CONTINUOUS PRN
Status: CANCELLED
Start: 2018-01-25

## 2018-01-12 RX ORDER — DIPHENHYDRAMINE HYDROCHLORIDE 50 MG/ML
50 INJECTION INTRAMUSCULAR; INTRAVENOUS
Status: CANCELLED
Start: 2018-01-25

## 2018-01-12 RX ORDER — EPINEPHRINE 0.3 MG/.3ML
0.3 INJECTION SUBCUTANEOUS EVERY 5 MIN PRN
Status: CANCELLED | OUTPATIENT
Start: 2018-01-25

## 2018-01-12 RX ORDER — ALBUTEROL SULFATE 0.83 MG/ML
2.5 SOLUTION RESPIRATORY (INHALATION)
Status: CANCELLED | OUTPATIENT
Start: 2018-01-25

## 2018-01-12 RX ORDER — MEPERIDINE HYDROCHLORIDE 25 MG/ML
25 INJECTION INTRAMUSCULAR; INTRAVENOUS; SUBCUTANEOUS EVERY 30 MIN PRN
Status: CANCELLED | OUTPATIENT
Start: 2018-01-25

## 2018-01-12 RX ORDER — METHYLPREDNISOLONE SODIUM SUCCINATE 125 MG/2ML
125 INJECTION, POWDER, LYOPHILIZED, FOR SOLUTION INTRAMUSCULAR; INTRAVENOUS
Status: CANCELLED
Start: 2018-01-12

## 2018-01-12 RX ADMIN — CARBOPLATIN 555 MG: 10 INJECTION, SOLUTION INTRAVENOUS at 11:20

## 2018-01-12 RX ADMIN — SODIUM CHLORIDE, PRESERVATIVE FREE 500 UNITS: 5 INJECTION INTRAVENOUS at 13:09

## 2018-01-12 RX ADMIN — MAGNESIUM SULFATE IN WATER 2 G: 40 INJECTION, SOLUTION INTRAVENOUS at 11:19

## 2018-01-12 RX ADMIN — ONDANSETRON 8 MG: 2 INJECTION INTRAMUSCULAR; INTRAVENOUS at 09:55

## 2018-01-12 RX ADMIN — SODIUM CHLORIDE, PRESERVATIVE FREE 5 ML: 5 INJECTION INTRAVENOUS at 08:48

## 2018-01-12 RX ADMIN — DOXORUBICIN HYDROCHLORIDE 50 MG: 2 INJECTABLE, LIPOSOMAL INTRAVENOUS at 10:16

## 2018-01-12 RX ADMIN — DEXTROSE 500 ML: 5 SOLUTION INTRAVENOUS at 09:55

## 2018-01-12 RX ADMIN — BEVACIZUMAB 600 MG: 400 INJECTION, SOLUTION INTRAVENOUS at 12:40

## 2018-01-12 RX ADMIN — DEXAMETHASONE SODIUM PHOSPHATE: 10 INJECTION, SOLUTION INTRAMUSCULAR; INTRAVENOUS at 09:59

## 2018-01-12 ASSESSMENT — ENCOUNTER SYMPTOMS: FATIGUE: 1

## 2018-01-12 ASSESSMENT — PAIN SCALES - GENERAL: PAINLEVEL: NO PAIN (0)

## 2018-01-12 NOTE — MR AVS SNAPSHOT
After Visit Summary   1/12/2018    Odalys Nathan    MRN: [id number]           Patient Information     Date Of Birth          1960        Visit Information        Provider Department      1/12/2018 8:30 AM  20 ATC;  ONCOLOGY INFUSION [facility]        Today's Diagnoses     Encounter for long-term (current) use of medications    -  1    Ovarian cancer, right (H)        Ovarian cancer, left (H)        Drug-induced neutropenia (H)          Care [facility]    [facility] Main Line: [phone]    Call triage nurse with chills and/or temperature greater than or equal to 100.4, uncontrolled nausea/vomiting, diarrhea, constipation, dizziness, shortness of breath, chest pain, bleeding, unexplained bruising, or any new/concerning symptoms, questions/concerns.   If you are having any concerning symptoms or wish to speak to a provider before your next infusion visit, please call your care coordinator or triage to notify them so we can adequately serve you.   Triage Nurse Line: [phone]    If after hours, weekends, or holidays, call main hospital  and ask for Oncology doctor on call @ [phone]               January 2018 Sunday Monday Tuesday Wednesday Thursday Friday Saturday        1     2     3     4     5     6       7     8     9     10     11     12     [facility] RETURN ACTIVE TREATMENT    7:25 AM   (40 min.)   Patricia Rocha APRN CNP   [facility] DRAW    8:20 AM   (15 min.)   [facility] LAB DRAW   [facility] Lab Draw     [facility] ONC INFUSION 240    8:30 AM   (240 min.)    ONCOLOGY INFUSION   [facility] 13       14     15     16     17     18     19     20       21     22     23     24     25     26     LEVEL 2    9:00 AM   (120 min.)    INFUSION CHAIR 3   [facility] 27 28 29 30 31 February 2018    Sunday Monday Tuesday Wednesday Thursday Friday Saturday                       1     2     3       4     5     6     7     8     9     [facility] DRAW   10:30 AM   (15 min.)   [facility]   [facility] Lab Draw     P RETURN ACTIVE TREATMENT   10:45 AM   (40 min.)   Patricia Rocha APRN CNP   [facility]     [facility] ONC INFUSION 240   12:00 PM   (240 min.)   UC ONCOLOGY INFUSION   [facility] 10       11     12     13     14     15     16     17       18     19     20     21     22     23     [facility] ONC INFUSION 60    9:00 AM   (60 min.)    ONCOLOGY INFUSION   [facility] 24       25     26     27     28                                 Lab Results:  Recent Results (from the past 12 hour(s))   Magnesium    Collection Time: 01/12/18  8:55 AM   Result Value Ref Range    Magnesium 1.4 (L) 1.6 - 2.3 mg/dL   Comprehensive metabolic panel    Collection Time: 01/12/18  8:55 AM   Result Value Ref Range    Sodium 138 133 - 144 mmol/L    Potassium 3.6 3.4 - 5.3 mmol/L    Chloride 104 94 - 109 mmol/L    Carbon Dioxide 25 20 - 32 mmol/L    Anion Gap 9 3 - 14 mmol/L    Glucose 109 (H) 70 - 99 mg/dL    Urea Nitrogen 14 7 - 30 mg/dL    Creatinine 0.72 0.52 - 1.04 mg/dL    GFR Estimate 83 >60 mL/min/1.7m2    GFR Estimate If Black >90 >60 mL/min/1.7m2    Calcium 8.7 8.5 - 10.1 mg/dL    Bilirubin Total 0.4 0.2 - 1.3 mg/dL    Albumin 3.3 (L) 3.4 - 5.0 g/dL    Protein Total 7.4 6.8 - 8.8 g/dL    Alkaline Phosphatase 122 40 - 150 U/L    ALT 30 0 - 50 U/L    AST 27 0 - 45 U/L   CBC with platelets differential    Collection Time: 01/12/18  8:55 AM   Result Value Ref Range    WBC 4.9 4.0 - 11.0 10e9/L    RBC Count 2.66 (L) 3.8 - 5.2 10e12/L    Hemoglobin 10.6 (L) 11.7 - 15.7 g/dL    Hematocrit 31.7 (L) 35.0 - 47.0 %     (H) 78 - 100 fl    MCH 39.8 (H) 26.5 - 33.0 pg    MCHC 33.4 31.5 - 36.5 g/dL    RDW 15.2 (H) 10.0 - 15.0 %    Platelet Count 163 150 - 450  10e9/L    Diff Method Automated Method     % Neutrophils 41.2 %    % Lymphocytes 41.1 %    % Monocytes 15.5 %    % Eosinophils 1.0 %    % Basophils 0.2 %    % Immature Granulocytes 1.0 %    Nucleated RBCs 0 0 /100    Absolute Neutrophil 2.0 1.6 - 8.3 10e9/L    Absolute Lymphocytes 2.0 0.8 - 5.3 10e9/L    Absolute Monocytes 0.8 0.0 - 1.3 10e9/L    Absolute Eosinophils 0.1 0.0 - 0.7 10e9/L    Absolute Basophils 0.0 0.0 - 0.2 10e9/L    Abs Immature Granulocytes 0.1 0 - 0.4 10e9/L    Absolute Nucleated RBC 0.0        Collection Time: 01/12/18  8:55 AM   Result Value Ref Range     80 (H) 0 - 30 U/mL   Protein qualitative urine    Collection Time: 01/12/18  8:58 AM   Result Value Ref Range    Protein Albumin Urine Trace (A) NEG^Negative mg/dL             Follow-ups after your visit        Your next 10 appointments already scheduled     Jan 26, 2018  9:00 AM CST   Level 2 with RH INFUSION CHAIR 3   [facility] ([facility])    [facility]  [address]  [facility] [address]   [phone]            Feb 09, 2018 10:30 AM CST   [facility] with [facility] DRAW   [facility] Lab Draw ([facility])    [address] [place] [address]  [address]  [facility] [id number] [phone]            Feb 09, 2018 11:00 AM CST   (Arrive by 10:45 AM)   Return Active Treatment with HAILE De Paz CNP   [facility] ([facility])    [address]  [address]  [facility] [id number] [phone]            Feb 09, 2018 12:00 PM CST   Infusion 240 with UC ONCOLOGY INFUSION, UC 15 ATC   [facility] ([facility])    [address]  [address]  [facility] [id number] [phone]            Feb 23, 2018  9:00 AM CST   Infusion 60 with UC ONCOLOGY INFUSION, UC 29 ATC   [facility] (  [facility])    [address]  [address]  [place] [address] [phone]              Future tests that were ordered for you today     Open Standing Orders        Priority Remaining Interval Expires Ordered    UA with Microscopic reflex to Culture Routine 2/2 1/12/2019 1/12/2018            Who to contact     If you have questions or need follow up information about today's clinic visit or your schedule please contact [facility] directly at [phone].  Normal or non-critical lab and imaging results will be communicated to you by [employer], letter or phone within 4 business days after the clinic has received the results. If you do not hear from us within 7 days, please contact the clinic through [employer] or phone. If you have a critical or abnormal lab result, we will notify you by phone as soon as possible.  Submit refill requests through [employer] or call your pharmacy and they will forward the refill request to us. Please allow 3 business days for your refill to be completed.          Additional Information About Your Visit        [employer]sat Information     [employer] gives you secure access to your electronic health record. If you see a primary care provider, you can also send messages to your care team and make appointments. If you have questions, please call your primary care clinic.  If you do not have a primary care provider, please call [phone] and they will assist you.        Care EveryWhere ID     This is your Care EveryWhere ID. This could be used by other organizations to access your [place] medical records  [id number]         Blood Pressure from Last 3 Encounters:   01/12/18 121/57   12/28/17 125/58   12/14/17 112/79    Weight from Last 3 Encounters:   01/12/18 64.4 kg (142 lb)   12/14/17 65.1 kg (143 lb 9.6 oz)   11/17/17 64 kg (141 lb 1.6 oz)              We Performed the Following          CBC with platelets differential      Comprehensive metabolic panel     Magnesium     Protein qualitative urine          Today's Medication Changes          These changes are accurate as of: 1/12/18  2:30 PM.  If you have any questions, ask your nurse or doctor.               These medicines have changed or have updated prescriptions.        Dose/Directions    magnesium oxide 400 MG tablet   Commonly known as:  MAG-OX   This may have changed:  when to take this   Used for:  Ovarian cancer, unspecified laterality (H)        Dose:  400 mg   Take 1 tablet (400 mg) by mouth 2 times daily   Quantity:  90 tablet   Refills:  3                Primary Care Provider Office Phone # Fax #    Aubrie Hester [phone]       [facility] [address] [facility] [address]        Equal Access to Services     SANDY CHAMBERS : Felicia Walter, wil mukherjee, vinita kaalmajulieta staples, balnka szymanski. [facility] [phone].    ATENCIÓN: Si habla español, tiene a bell disposición servicios gratuitos de asistencia lingüística. Llame al [phone].    We comply with applicable federal civil rights laws and [place] laws. We do not discriminate on the basis of race, color, national origin, age, disability, sex, sexual orientation, or gender identity.            Thank you!     Thank you for choosing [facility]  for your care. Our goal is always to provide you with excellent care. Hearing back from our patients is one way we can continue to improve our services. Please take a few minutes to complete the written survey that you may receive in the mail after your visit with us. Thank you!             Your Updated Medication List - Protect others around you: Learn how to safely use, store and throw away your medicines at www.disposemymeds.org.          This list is accurate as of: 1/12/18  2:30 PM.  Always use your most recent med list.                   Brand Name Dispense  Instructions for use Diagnosis    ASPIRIN PO      Take 325 mg by mouth daily        CALCIUM + D PO      Take 1 tablet by mouth daily.    Pelvic mass       cyanocobalamin 1000 MCG/ML injection    VITAMIN B12    1 mL    Inject 1 mL (1,000 mcg) into the muscle every 30 days    B12 deficiency       diphenoxylate-atropine 2.5-0.025 MG per tablet    LOMOTIL    60 tablet    Take 2 tablets by mouth 4 times daily as needed for diarrhea    Acute diarrhea       Ferrous Sulfate 324 (65 FE) MG Tbec     90 tablet    TAKE 1 TABLET BY MOUTH 3 TIMES DAILY WITH MEALS. TAKE WITH A SMALL AMOUNT OF ORANGE JUICE. DO NOT TAKE WITH CALCIUM.    Ovarian cancer, right (H), Anemia, unspecified type, Ovarian cancer, left (H)       HERBALS      daily        iohexol 140 MG/ML Soln solution    OMNIPAQUE    140 mL    Mix entire bottle (50ml) of contast with 600ml (20 ounces) of water and drink half 2 hrs prior to CT scan and half 1 hr prior to scan    Ovarian cancer, right (H), Metastatic cancer (H)       LEVOTHYROXINE SODIUM PO      Take by mouth daily        LORazepam 1 MG tablet    ATIVAN    30 tablet    Take 1 tablet (1 mg) by mouth every 6 hours as needed (Anxiety, Nausea/Vomiting or Sleep)    Ovarian cancer, unspecified laterality (H)       magnesium oxide 400 MG tablet    MAG-OX    90 tablet    Take 1 tablet (400 mg) by mouth 2 times daily    Ovarian cancer, unspecified laterality (H)       METFORMIN HCL PO      Take 500 mg by mouth daily        order for DME     3 each    Injection Supplies for Vitamin B12: 3cc syringes w/ 27 gauge needles, 1/2 inch length    B12 deficiency       potassium chloride 20 MEQ Packet    KLOR-CON     Take 20 mEq by mouth daily        prochlorperazine 10 MG tablet    COMPAZINE    30 tablet    Take 1 tablet (10 mg) by mouth every 6 hours as needed (nausea/vomiting)    Encounter for long-term (current) use of medications, Ovarian cancer, right (H), Ovarian cancer, left (H), Drug-induced neutropenia (H)        sulfamethoxazole-trimethoprim 800-160 MG per tablet    BACTRIM DS/SEPTRA DS    14 tablet    Take 1 tablet by mouth 2 times daily    Dysuria       VITAMIN C PO      Take 500 mg by mouth daily    Pelvic mass       VITAMIN D PO      Take 1,000 Units by mouth daily Unknown dose        VITAMIN E NATURAL PO      Take 100 Units by mouth daily

## 2018-01-12 NOTE — PATIENT INSTRUCTIONS
[facility] Main Line: [phone]    Call triage nurse with chills and/or temperature greater than or equal to 100.4, uncontrolled nausea/vomiting, diarrhea, constipation, dizziness, shortness of breath, chest pain, bleeding, unexplained bruising, or any new/concerning symptoms, questions/concerns.   If you are having any concerning symptoms or wish to speak to a provider before your next infusion visit, please call your care coordinator or triage to notify them so we can adequately serve you.   Triage Nurse Line: [phone]    If after hours, weekends, or holidays, call main hospital  and ask for Oncology doctor on call @ [phone]               January 2018 Sunday Monday Tuesday Wednesday Thursday Friday Saturday        1     2     3     4     5     6       7     8     9     10     11     12     [facility] RETURN ACTIVE TREATMENT    7:25 AM   (40 min.)   Patricia Rocha APRN CNP   [facility] LAB DRAW    8:20 AM   (15 min.)    [facility] LAB DRAW   [facility] Lab Draw     [facility] ONC INFUSION 240    8:30 AM   (240 min.)    ONCOLOGY INFUSION   [facility] 13       14     15     16     17     18     19     20       21     22     23     24     25     26     LEVEL 2    9:00 AM   (120 min.)    INFUSION CHAIR 3   [facility] 27       28     29     30     31 February 2018 Sunday Monday Tuesday Wednesday Thursday Friday Saturday                       1     2     3       4     5     6     7     8     9     [facility] DRAW   10:30 AM   (15 min.)    [facility] DRAW   [facility] Lab Draw     P RETURN ACTIVE TREATMENT   10:45 AM   (40 min.)   Patricia Rocha APRN CNP   [facility]     [facility] ONC INFUSION 240   12:00 PM   (240 min.)    ONCOLOGY INFUSION   [facility] 10       11     12     13     14     15     16     17        18     19     20     21     22     23     [facility] ONC INFUSION 60    9:00 AM   (60 min.)    ONCOLOGY INFUSION   [facility] 24       25     26     27     28                                 Lab Results:  Recent Results (from the past 12 hour(s))   Magnesium    Collection Time: 01/12/18  8:55 AM   Result Value Ref Range    Magnesium 1.4 (L) 1.6 - 2.3 mg/dL   Comprehensive metabolic panel    Collection Time: 01/12/18  8:55 AM   Result Value Ref Range    Sodium 138 133 - 144 mmol/L    Potassium 3.6 3.4 - 5.3 mmol/L    Chloride 104 94 - 109 mmol/L    Carbon Dioxide 25 20 - 32 mmol/L    Anion Gap 9 3 - 14 mmol/L    Glucose 109 (H) 70 - 99 mg/dL    Urea Nitrogen 14 7 - 30 mg/dL    Creatinine 0.72 0.52 - 1.04 mg/dL    GFR Estimate 83 >60 mL/min/1.7m2    GFR Estimate If Black >90 >60 mL/min/1.7m2    Calcium 8.7 8.5 - 10.1 mg/dL    Bilirubin Total 0.4 0.2 - 1.3 mg/dL    Albumin 3.3 (L) 3.4 - 5.0 g/dL    Protein Total 7.4 6.8 - 8.8 g/dL    Alkaline Phosphatase 122 40 - 150 U/L    ALT 30 0 - 50 U/L    AST 27 0 - 45 U/L   CBC with platelets differential    Collection Time: 01/12/18  8:55 AM   Result Value Ref Range    WBC 4.9 4.0 - 11.0 10e9/L    RBC Count 2.66 (L) 3.8 - 5.2 10e12/L    Hemoglobin 10.6 (L) 11.7 - 15.7 g/dL    Hematocrit 31.7 (L) 35.0 - 47.0 %     (H) 78 - 100 fl    MCH 39.8 (H) 26.5 - 33.0 pg    MCHC 33.4 31.5 - 36.5 g/dL    RDW 15.2 (H) 10.0 - 15.0 %    Platelet Count 163 150 - 450 10e9/L    Diff Method Automated Method     % Neutrophils 41.2 %    % Lymphocytes 41.1 %    % Monocytes 15.5 %    % Eosinophils 1.0 %    % Basophils 0.2 %    % Immature Granulocytes 1.0 %    Nucleated RBCs 0 0 /100    Absolute Neutrophil 2.0 1.6 - 8.3 10e9/L    Absolute Lymphocytes 2.0 0.8 - 5.3 10e9/L    Absolute Monocytes 0.8 0.0 - 1.3 10e9/L    Absolute Eosinophils 0.1 0.0 - 0.7 10e9/L    Absolute Basophils 0.0 0.0 - 0.2 10e9/L    Abs Immature Granulocytes 0.1 0 - 0.4 10e9/L    Absolute Nucleated RBC 0.0         Collection Time: 01/12/18  8:55 AM   Result Value Ref Range     80 (H) 0 - 30 U/mL   Protein qualitative urine    Collection Time: 01/12/18  8:58 AM   Result Value Ref Range    Protein Albumin Urine Trace (A) NEG^Negative mg/dL

## 2018-01-12 NOTE — LETTER
2018       RE: Odalys Nathan  [id number]  [facility] [id number]     Dear Colleague,    Thank you for referring your patient, Odalys Nathan, to the [facility]. Please see a copy of my visit note below.    Gynecologic Oncology Follow-Up Note  RE: Odalys Nathan  MRN: [id number]  : 1960  Date of Visit: 2018    CC: Odalys Nathan is a 57 year old year old female with recurrent stage IIIC bilateral ovarian cancer who presents today for disease management with Doxil.    HPI: Odalys comes to the clinic accompanied by her  Marcos. She is feeling well today but reports this past cycle was more difficult for her. She had nausea and vomiting the night of her last chemotherapy which she has never had before. Feels her fatigue continues to worsen but is able to carry out ADLs and travel. Takes magnesium once daily when she feels she can tolerate it- develops diarrhea with this. She continues on ferrous sulfate 325mg TID for history of anemia. She also continues tinnitus- denies need for intervention. Has had many cycles of carboplatin, no history of reaction thus far. Developed hematuria last week which resolved with increasing her fluid intake, currently asymptomatic. No mucosal issues with Doxil. She is eating and drinking well, reports she is ready for chemotherapy today.      Brief Oncology History:  2012 - Admitted to hospital for 2 weeks of intermittent abdominal cramping, distention, diarrhea and N/V. CT of abdomen/pelvis significant for small bowel obstruction, a heterogenous soft tissue density in the pelvis, omental nodules, and ascites. Bilateral adnexal masses per U/S of pelvis. CA-125 was elevated at 987, CEA was normal at 1.0.    12 - Therapeutic paracentesis (4 L) with cytology confirming malignancy (MI positive, weak ER positive, CK-7 positive consistent with GYN primary). Surgery recommended d/t potential for falling blood  counts 2/2 chemotherapy (patient is [employer] and limiting blood transfusion)    2/1/12 - Exploratory laparotomy, MAR BSO, lysis of adhesion, Appendectomy, Repair cystotomy, Omentectomy Yoml-nfgxrv-blzwhdaoj-transverse-colon resection, Ileo-descending colon anastomosis, CUSA, & Colonoscopy (done by Dr. Amato and colorectal team).  2/20/2012 - Admitted to hospital for bilateral pulmonary emboli and drainage of pleural effusion. Started on Lovenox.  2/28/12-3/21/12: Cycle #1-2 Carbo/Taxol IV  3/29/12 - Started on Keflex by her PCP for infection in her healing wound (immediately below her umbilicus).    4/11/12-6/14/12: Cycle #3-6 IV chemo, Cycle #1 IV/IP chemo  7/16/12 -  8, CT PRADEEP - enrolled in  - observation arm  3/4/13-6/28/13:  6, 9, 12, 15, 20.  7/1/13: CT Chest, Abdomen, Pelvis IMPRESSION:  1. Worsening metastatic ovarian carcinoma suggested by increased size of soft tissue nodules anterior to the right psoas muscle, that may represent growing mesenteric lymphadenopathy.  2. Remaining prominent right lower quadrant mesenteric lymph nodes are not significantly changed from CT 7/16/2012.  3. Clustered nodular opacities in the right lower lobe are not significant change from 7/16/2012 and remain indeterminate. Again, the appearance and distribution is suggestive of an infectious etiology.  4. Stable 8 mm soft tissue nodule in left breast, unchanged since at least 02/20/2012. This can be observed on followup studies, but correlation with mammography could be considered.  Decision made to start Doxil/Carbo.  7/11/13: The left ventricular ejection fraction is normal at 66.4%.  7/12/13-9/6/13: Cycle #1-3 Doxil/Carbo.  10, 11.    9/30/13 CT C/A/P Impression:  1. Overall, favorable response to treatment with decreasing size of soft tissue nodules tracking along the anterior aspect of the right psoas muscle.  2. 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  3. Improved cluster of right  lower lobe pulmonary nodular opacities. These may represent resolving infection.  10/3/13-12/9/13:  9, 8, 10. Cycle 4-6 Doxil/Carbo.    1/13/14 CT C/A/P Impression:   1. Stable appearance of metastatic ovarian cancer. Scattered soft tissue nodules along the anterior aspect of the right psoas muscle are unchanged in size. Mild mesenteric lymphadenopathy is unchanged.  2. Clustered micronodules in the right lower lobe are unchanged from 9/30/2013, but improved from 7/1/2013. This history suggests a postinflammatory/postinfectious etiology.  3. 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  1/16/14 Discussed multiple options for her based on relatively stable-appearing disease on CT but slight increase in  (which has had small increase to 20 with last recurrence) including chemo break with recheck of  in 1 month, starting new chemo agent immediately, and exploratory surgery with possible resection of nodules. She is considered platinum-sensitive based on > 1 year remission after Taxol/Carbo, which we will take into consideration for future chemo planning. I am not inclined to surgery at this time given difficulty she already has with diarrhea secondary to past colon resection. I suspect we would need to resect further bowel due to mesenteric disease. Also explained inherent risks of any major surgery. Also mentioned maintenance chemo, but this has not been shown to increase overall survival and would likely decrease her quality of life without significant benefit. Family is going on vacation to [place] in 2 weeks and Odalys does not want to have chemo prior to that, so will plan to take 1 month break. She can have  at that time (discussed checking toady since last draw was in early December, but as it would likely not change treatment plan and she has h/o slow rising , will not check today).  2/17/14  16    2/20/14: Decision to take break from chemo for two months, followed by CT  "and CA-125.    4/21/14  27  4/21/14 CT C/A/P Impression:    1. Increased size of 2 low-attenuation lymph nodes anterior to the right psoas muscle is concerning for worsening metastatic ovarian cancer.    2. New circumferential thickening of a 3.8 cm length segment of distal transverse colon is likely physiologic. Recommend attention on followup imaging.    3. Grossly unchanged size of clustered small nodules versus scarring in the right lower lobe the lungs.    4. Stable thrombosis of the right ovarian vein.  5/14/14: Diagnostic laparoscopy converted to exploratory laparotomy and removal of mesenteric masses, tumor debulking, peritoneal biopsies and intraperitoneal port placement. On laparoscopy, it was noted that there were small nodules on the anterior abdominal wall near the previous incision, small nodules on the right pelvic sidewall as well. Nodules were palpated in the mesentery; however, as it was unable to clarify where the origin of the nodules was, the decision was made to open the patient. On opening there was found to be approximately a 3 cm nodule in the small bowel mesentery and another separate approximately 2 cm nodule in the bowel mesentery. Pelvis without evidence of cancer, some mesenteric lymph nodes were palpated. No evidence otherwise of any disseminated cancer throughout the abdomen.    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 (2/2)  -Largest metastasis: 1.3 cm  -See comment  D: Peritoneum, right paracolic gutter #2, biopsy:  -Fibroadipose tissue with granulomatous inflammation surrounding refractile material  -Negative for malignancy   E: " "Small bowel adhesion, biopsy:  -Fibroconnective tissue, consistent with adhesion  -Negative for malignancy  F: Lymph nodes, mesentry, not otherwise specified, excision:  -Two lymph nodes, negative for metastatic carcinoma (0/2)  G: Lymph node, mesentery, \"#2\", excision:  -One lymph node, negative for metastatic carcinoma (0/1)  H: Lymph nodes, mesentery, \"nodule #2\", excision:  -Five lymph nodes, negative for metastatic carcinoma (0/5)  COMMENT:  Some of the specimens show post-operative changes. Others show possible treatment related changes, including necrosis. The metastatic carcinoma in the mesenteric lymph nodes (specimen C) shows variable morphology, including relatively low grade tumor with papillary architecture, and high grade tumor comprised of nests of tumor cells with irregular, slit-like spaces and marked nuclear pleomorphism.    5/29/14: Cycle 1 IV PACLitaxel / IP CISplatin / IP PACLitaxel.  - 28.  6/26/14: Cycle #2 IV/IP.  10  8/5/14: CT chest/abd/pelvis IMPRESSION     1. In this patient with ovarian cancer, overall findings are indicative of stable/slight improvement, as multiple mesenteric lymphadenopathy and scattered nodular peritoneal soft tissue mass lesions appear unchanged or slightly smaller since 4/21/2014.    2. 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 Mild dilatation of the second and the third portion of the duodenum with a narrow SMA angle. This could represent SMA syndrome, if clinically correlated.17/14:    Cycle #3 IV/IP.  10.    CT chest/abd/pelvis with contrast on 8/5/14    Impression:    1. In this patient with ovarian cancer, overall findings are indicative of stable/slight improvement, as multiple mesenteric lymphadenopathy and scattered nodular peritoneal soft tissue mass lesions appear unchanged or slightly smaller since 4/21/2014.    2. 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 Mild dilatation of the second and the third " portion of the duodenum with a narrow SMA angle. This could represent SMA syndrome, if clinically correlated.  8/7/14: Cycle #4 Taxol/Carbo (changed from IV/IP).  10. She has been feeling okay. She is unsure if she can finish out the course of 6 cycles IV/IP taxol/cisplatin. She feels like she has the flu for about a week then starts feeling gradually better after each chemo cycle. Her spouse notes that she actually has been more sick with the treatments than she initially admits here. She was also previously having some rib pain. Denies any rib pain now. Denies any chest pain or shortness of breath.  Plan: discussed recent CT cap results and switching to just IV as she is feeling miserable with IP treatments. Switch to IV carbo/taxol as patient is platinum sensitive.  We also discussed her taking part of the tesaro trial, which would require BRCA testing. She would like to take part in this trial if eligible.  8/20/14: Remove Intraperitoneal Port ( Port and catheter intact - discarded)  8/28/14: Cycle #5 Taxol/Carbo held due to thrombocytopenia.  6.    She denies any vaginal bleeding, no changes in her bowel or bladder habits, no nausea/emesis, no lower extremity edema, and no difficulties eating or sleeping. She denies any abdominal discomfort/bloating, no fevers or chills, and no chest pain or shortness of breath. She states her diarrhea is the same. She reports some fatigue which improves about 1-2 weeks after her chemotherapy. She states she does not need any medication refills and she was told she does not meet the criteria for the TESARO trial. She states she has 3 bags of iv fluids left over from her previous chemotherapy and will give these to herself. She states she is ready for her treatment today.    9/29/14: Cycle #6 Taxol/Carbo  6. Insurance questions regarding GSF coverage today. No concerns other than fatigue. Taking iron for anemia and does not desire blood transfusion. Using  neulasta for neutropenia. Using home IV hydration if needed. Baseline unchanged. No abdominal bloating, constipation, diarrhea, pain, vaginal or rectal bleeding, cough or dyspnea, fluid retention.    10/16/14: Impression:    1. Nodular peritoneal soft tissue mass in the right lower quadrant adjacent to the psoas muscle is no longer appreciated. Adjacent prominent lymphadenopathy is unchanged from previous exam. No new peritoneal lesions.    2. 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    10/20/14:  5. CT chest/abdomen/pelvis on 10/16/14 showed nodular peritoneal soft tissue mass in the right lower quadrant adjacent to the psoas muscle is no longer appreciated. Adjacent prominent lymphadenopathy is unchanged from previous exam. 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  1/27/15:  6.  4/28/15:  14.  5/26/15:  18.  6/2/15: CT cap Impression:  1. Postsurgical changes of hysterectomy and bilateral salpingo-oophorectomy for ovarian cancer. There is a new 8 mm hazy, ill-defined hypoattenuating lesion in hepatic segment 6 which is suspicious for a metastatic deposit. Further evaluation with ultrasound in recommended.    2. Increased size of a left retroperitoneal lymph node which is indeterminate but may represent a ciro metastasis. Mildly prominent lymph nodes in the right lower quadrant are not significantly changed.  3. Moderate colonic stool burden.    6/4/15: US abdomen IMPRESSION:    Hyperechoic lesion in the right hepatic lobe, consistent with hemangioma. This does not corresponds to the area of the lesion seen on CT from 6/2/2015. An MR would be helpful for identifying and characterizing the lesion from the recent CT.  6/12/15: MR abd IMPRESSION:  1. New 20 x 11 mm enhancing lesions between the right obliques, concerning for metastatic disease. This lesions should be amenable to percutaneous biopsy, if indicated.  2. Correlating to  the lesion visualized on comparison CT is a hepatic segment 6 subcapsular 7 mm lesion. Overall the appearance favors the diagnosis of a simple cyst. However, there is faint suggestion of mild peripheral arterial enhancement. Although this is favored as  artifactual, this should be followed up to confirm stability. Recommend 6 month followup.  3. Hepatic segment 6, 5 mm lesion too small to technically characterize. Differential would favor FNH, less likely flash filling hemangioma. Recommend attention on followup.  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     9/1/15: Cycle #1 Avastin/Cytoxan.   31.     9/24/15:feeling generally well. She says she has been having back and stomach spasms. She is taking cytosine daily (she ran out yesterday). She also says its affecting her voice. Admits that it burns occasionally. She is eating and drinking normal. She also admit diarrhea, 5-7 times daily, lose/watery. She trying to stay hydrated and eat fiber. She also says that her body is sore, especially the bottom of her feet. Her blood pressure is normal. She also admits having headache after her first infusion.      9/24/15: Cycle #2 Avastin/Cytoxan.  19.  10/15/15: Cycle #3 Avastin/Cytoxan.  16.     11/6/15: CT c/a/p IMPRESSION:    1. Stable postoperative change of MAR/BSO for ovarian cancer.  2. The lesion in the right flank abdominal musculature is slightly decreased in size. Otherwise, stable examination.  2. No evidence of metastatic disease in the chest.    11/20/15: Treatment planning visit,  16    11/25/15: surgical pathology report  FINAL DIAGNOSIS:  Soft tissue, right oblique muscle mass, excision:  -Recurrent ovarian serous carcinoma  -Carcinoma is present less than 1 mm from one resection margin  -Background skeletal muscle and fibroadipose tissue    1/4/16:   22  1/11/16-1/27/16: Radiation to right flank x 12 treatments  4/7/2016:  94. CT cap IMPRESSION:    In this patient with ovarian cancer status post MAR/BSO and descending/transverse colectomy:  1. No evidence for malignancy in the chest, abdomen, or pelvis.  2. Stable small hypodense segment 6 liver lesion, appears more likely benign, possibly a cyst.  5/13/16:  124.  6/3/16: PET CT IMPRESSION: In this patient with a history of ovarian cancer:  1. Hypermetabolic and enlarging periaortic and perihepatic lymphadenopathy compatible with metastatic disease, as detailed above.  2. Although hypodense lesion in hepatic segment 6 has been present since 6/2/2015 associated hypermetabolism makes this lesion highly concerning for metastatic disease.    Plan: to start Niraparib under TESARO study.     6/9/16:  137.  6/14/16: Cycle #1 Niraparib.    6/28/16: Cycle #1 D15 Niraparib.    7/5/16:  100.    7/11/16: Cycle #2 Nraparib.   83.    8/3/2016: PET CT IMPRESSION:    In this patient with known history of ovarian cancer:  1) New pleural based nodular opacities in the lateral and inferior aspects of the bilateral lower lobes, worse in the left lung. Likely infection. Close follow up is recommended.      2) Slight decrease in hypermetabolic abdominal lymphadenopathy. 2 hypermetabolic lymph nodes persist.  3) Unchanged right hepatic lobe metastatic lesion.     8/9/16: Cycle #3 Niraparib.  69.  9/6/16: Cycle #4 Niraparib.  53. Dose held due to anemia.  9/13/16: Eval for potential cycle 4 niraparib. Dose held due to anemia.  10/4/16: CT CAP impression:  IMPRESSION: In this patient with a known history of ovarian cancer:  1. There has been interval resolution of pleural-based nodular  opacities which likely represented infection.  2. Abdominal lymphadenopathy in the form of 2 portacaval lymph nodes  have not significantly changed in size, noted to be hypermetabolic on  prior PET/CT.  3.  Previously demonstrated metastatic lesion in the right lobe of the  liver is not significantly changed.  10/11/16:  60  11/1/16: C6 niraparib,  75  11/29/16: C7 niraparib.  78. CT CAP impression as follows:  Target lesions (RECIST criteria):       A previously described target lesion superior to the head of the  pancreas (series 2, image 64)  (referred to as a perihepatic node on  6/3/2016) may not be a valid target lesion because it measured less  than 1.5 cm originally. However, this particular node has decreased in  size, now measuring 7 mm in short axis versus 14 mm on 6/3/2016 when  measured in a similar fashion.       2.3 cm short axis portacaval lymph node on series 2 image 67,  previously 2.0 cm on 10/4/2016.       1.2 cm subtle hypodensity in hepatic segment 6 on series 2 image 75,  stable on multiple studies since at least 6/3/2016     Sum of diameters today: 3.5 cm. Sum of diameters 10/4/2016: 3.2 cm.  Growth = 9%.    12/27/16: C8 niraparib.  105.  1/25/17: C9 niraparib.  108.  2/23/17: C10 niraparib.  132.   CT CAP impression:  Sum of target lesion diameters today: 3.7 cm. Sum of target lesion  diameters on 11/28/2016: 3.5 cm. Growth= 6%  1. In this patient with history of ovarian cancer there is stable  disease by RECIST criteria as evidenced by:   1a. Mildly increased size of liver metastasis.  1b. Stable portacaval lymphadenopathy.  1c. No evidence of metastatic disease in the chest.  2. Trace emphysematous changes of the lungs.  3/22/17: C11 niraparib.  132.  4/19/17: C12 niraparib.  127.  5/16/17: CT CAP IMPRESSION: In this patient with ovarian cancer:  1. Mildly increased size of hepatic metastasis segment 6 with subtle increased capsular retraction.  2. Stable edmundo hepatis nodes, with mild increase in size of periaortic lymph nodes.  3. Prominent left supraclavicular lymph node with subtle increase in size compared to prior studies, particularly  comparing to 10/4/2016.  4. Mild subtle groundglass opacities in the right upper lobe, not present on prior study, lesser extent in the right lower lobe.  Findings may represent infection, additional consideration is malignancy (less likely), and attention on follow-up study  Recommended.  Addendum:   Prominent left supraclavicular lymph node (3/25) is stable from most recent CT performed 2/20/2017, currently measuring 14 x 16 mm, previously 14 x 16 mm on 2/20/2017.      The portal caval lymph node/edmundo hepatis lymph node is stable from 2/20/2017, measuring 21 mm.      Clarification of size of the para-aortic lymph node (series 3 image 327). It short axis measurement is 11 mm versus 9 mm on prior study.      Hepatic segment 6 triangular-shaped low density lesion (3/362) measures 14 mm, previously measured 12 mm, demonstrating a  possible/questionable minimal subtle increase in size. Similarly the lymph nodes noted above in the supraclavicular and para-aortic regions demonstrate possible minimal possible subtle increase in size.      5/18/17: Cycle #13 Niraparib.  191.  6/15/17: Cycle #14 Niraparib.  146.  7/13/17: Cycle #15 Niraparib 200 mg.  171     CT (8/9/17):       IMPRESSION:  1. Segment 6 hepatic metastasis is stable to minimally increased in  size.  2. Slight increase in multicentric adenopathy, most pronounced at the  edmundo hepatis. Additional sites include the inferior left  neck/supraclavicular region and retroperitoneum which appear stable to  minimally increased.      8/10/17:  175  9/14/17: MUGA LVEF 54%  9/22/17: C1D1 carboplatin/Doxil.  187.  10/19/17: C2D1 carboplatin/Doxil.  108.  11/17/17: C3D1 carboplatin/Doxil.  82.  12/14/17: C4D1 carboplatin/Doxil.  83.  1/12/18: C5D1 carboplatin/Doxil.  pending.    Past Medical History:   Diagnosis Date     Antiplatelet or antithrombotic long-term use      Ascites      Blood clot in the legs      Diabetes  (H)      Ovarian cancer (H)     serous,stg IV     Pleural effusion      Pulmonary embolism (H) 2/2012     Refusal of blood transfusions as patient is [employer]ah[employer]ism      Short gut syndrome      Subclinical hypothyroidism 4/18/2013     Thrombosis of leg        Past Surgical History:   Procedure Laterality Date     COLECTOMY       COLONOSCOPY  2/1/2012    Procedure:COLONOSCOPY; With Biopsy; Surgeon:TONI SULLIVAN; Location:[facility]     HYSTERECTOMY TOTAL ABD, RHIANNA SALPINGO-OOPHORECTOMY, NODE DISSECTION, TUMOR DEBULKING, COMBINED  2/1/2012    Procedure:COMBINED HYSTERECTOMY TOTAL ABDOMINAL, BILATERAL SALPINGO-OOPHORECTOMY, NODE DISSECTION, TUMOR DEBULKING;  Exploratory Laparotomy, Total Abdominal Hysterectomy, Bilateral Salpingo-Oophorectomy, appendectomy,lysis of adhesions, ileal, ascending, transverse and splenic flexure resection, ileal descending bowel renanastomosis, incidental cystotomy repair, CUSA procedure and colonoscopy ; [facility]     INSERT PORT PERITONEAL ACCESS  4/3/2012    Procedure:INSERT PORT PERITONEAL ACCESS; Intraperitoneal Port Placement (c-arm); Surgeon:SAMUEL CARRASCO; Location:[facility]     INSERT PORT PERITONEAL ACCESS  5/14/2014    Procedure: INSERT PORT PERITONEAL ACCESS;  Surgeon: Nga Yeugn MD;  Location: [facility] OR     INSERT PORT VASCULAR ACCESS       LAPAROSCOPY DIAGNOSTIC (GYN)  5/14/2014    Procedure: LAPAROSCOPY DIAGNOSTIC (GYN);  Surgeon: Nga Yeung MD;  Location: [facility] OR     LAPAROTOMY EXPLORATORY Right 11/25/2015    Procedure: LAPAROTOMY EXPLORATORY;  Surgeon: Nga Yeung MD;  Location: [facility] OR     LAPAROTOMY, TUMOR DEBULKING, COMBINED  5/14/2014    Procedure: COMBINED LAPAROTOMY, TUMOR DEBULKING;  Surgeon: Nga Yeung MD;  Location: [facility]     REMOVE CATHETER PERITONEAL N/A 8/20/2014    Procedure: REMOVE CATHETER PERITONEAL;  Surgeon: Nga Yeung MD;  Location: [facility] OR     VASCULAR SURGERY      stent left iliac vein       Current Outpatient  Prescriptions   Medication     iohexol (OMNIPAQUE) 140 MG/ML SOLN solution     sulfamethoxazole-trimethoprim (BACTRIM DS/SEPTRA DS) 800-160 MG per tablet     Ferrous Sulfate 324 (65 FE) MG TBEC     LORazepam (ATIVAN) 1 MG tablet     prochlorperazine (COMPAZINE) 10 MG tablet     LEVOTHYROXINE SODIUM PO     order for DME     METFORMIN HCL PO     diphenoxylate-atropine (LOMOTIL) 2.5-0.025 MG per tablet     cyanocobalamin (VITAMIN B12) 1000 MCG/ML injection     magnesium oxide (MAG-OX) 400 MG tablet     ASPIRIN PO     potassium chloride (KLOR-CON) 20 MEQ packet     VITAMIN E NATURAL PO     Cholecalciferol (VITAMIN D PO)     HERBALS     Ascorbic Acid (VITAMIN C PO)     Calcium Carbonate-Vitamin D (CALCIUM + D PO)     No current facility-administered medications for this visit.      Facility-Administered Medications Ordered in Other Visits   Medication     heparin 100 UNIT/ML injection 500 Units          No Known Allergies    Family History   Problem Relation Age of Onset     CANCER Mother 69     lung, smoker     CANCER Maternal Uncle 65     brain     Colon Cancer Maternal Aunt 80     colon       Social History     Social History     Marital status:      Spouse name: N/A     Number of children: N/A     Years of education: N/A     Occupational History     Not on file.     Social History Main Topics     Smoking status: Former Smoker     Years: 8.00     Types: Cigarettes     Quit date: 6/21/1980     Smokeless tobacco: Never Used      Comment: started at 9 yo and quit at 16 yo     Alcohol use Yes      Comment: 3x/day wine or jodi     Drug use: No     Sexual activity: Not on file     Other Topics Concern     Not on file     Social History Narrative       Review of Systems     Constitutional:  Positive for fatigue. Negative for fever, chills, weight loss, weight gain, decreased appetite, night sweats, recent stressors, height gain, height loss, post-operative complications, incisional pain, hallucinations, increased  energy, hyperactivity and confused.   HENT:  Positive for tinnitus. Negative for ear pain, hearing loss, nosebleeds, trouble swallowing, hoarse voice, mouth sores, sore throat, ear discharge, tooth pain, gum tenderness, taste disturbance, smell disturbance, hearing aid, bleeding gums, dry mouth, sinus pain, sinus congestion and neck mass.    Eyes:  Negative for double vision, pain, redness, eye pain, decreased vision, eye watering, eye bulging, eye dryness, flashing lights, spots, floaters, strabismus, tunnel vision, jaundice and eye irritation.   Respiratory:   Negative for cough, hemoptysis, sputum production, shortness of breath, wheezing, sleep disturbances due to breathing, snores loudly, respiratory pain, dyspnea on exertion, cough disturbing sleep and postural dyspnea.    Cardiovascular:  Negative for chest pain, dyspnea on exertion, palpitations, orthopnea, claudication, leg swelling, fingers/toes turn blue, hypertension, hypotension, syncope, history of heart murmur, chest pain on exertion, chest pain at rest, pacemaker, few scattered varicosities, leg pain, sleep disturbances due to breathing, tachycardia, light-headedness, exercise intolerance and edema.   Gastrointestinal:  Positive for diarrhea. Negative for heartburn, nausea, vomiting, abdominal pain, constipation, blood in stool, melena, rectal pain, bloating, hemorrhoids, bowel incontinence, jaundice, rectal bleeding, coffee ground emesis and change in stool.   Genitourinary:  Positive for hematuria. Negative for bladder incontinence, dysuria, urgency, flank pain, vaginal discharge, difficulty urinating, genital sores, dyspareunia, decreased libido, nocturia, voiding less frequently, arousal difficulty, abnormal vaginal bleeding, excessive menstruation, menstrual changes, hot flashes, vaginal dryness and postmenopausal bleeding.   Musculoskeletal:  Negative for myalgias, back pain, joint swelling, arthralgias, stiffness, muscle cramps, neck pain,  bone pain, muscle weakness and fracture.   Skin:  Negative for nail changes, itching, poor wound healing, rash, hair changes, skin changes, acne, warts, poor wound healing, scarring, flaky skin, Raynaud's phenomenon, sensitivity to sunlight and skin thickening.   Neurological:  Negative for dizziness, tingling, tremors, speech change, seizures, loss of consciousness, weakness, light-headedness, numbness, headaches, disturbances in coordination, extremity numbness, memory loss, difficulty walking and paralysis.   Endo/Heme:  Negative for anemia, swollen glands and bruises/bleeds easily.   Psychiatric/Behavioral:  Negative for depression, hallucinations, memory loss, decreased concentration, mood swings and panic attacks.    Breast:  Negative for breast discharge, breast mass, breast pain and nipple retraction.   Endocrine:  Negative for altered temperature regulation, polyphagia, polydipsia, unwanted hair growth and change in facial hair.        Physical Exam:    /57 (BP Location: Right arm, Cuff Size: Adult Regular)  Pulse 89  Temp 98.9  F (37.2  C) (Oral)  Resp 16  Wt 64.4 kg (142 lb)  SpO2 95%  BMI 23.63 kg/m2    General: Alert non-toxic appearing female in no acute distress  HEENT: Normocephalic, atraumatic; PERRLA; no scleral icterus; oropharynx pink without lesions; neck supple  Pulmonary: Lungs clear to auscultation, no increased work of breathing noted  Cardiac: Regular rate and rhythm, S1S2, no clicks, murmurs, rubs, or gallops; bilateral lower extremities without edema  GI: Normoactive bowel sounds x4 quadrants, abdomen soft, non-distended, and non-tender to palpation without masses or organomegaly  : Not indicated  Heme: Cervical, supraclavicular, and inguinal nodes without lymphadenopathy  MSK: Moves all extremities, no obvious muscle wasting  Neuro: No gross deficits, normal gait  Skin: Appropriate color for race, warm and dry, no rashes or lesions to unclothed skin; no palmar plantar  erythrodysesthesia  Psych: Pleasant and interactive, affect bright, makes appropriate eye contact, thought process linear    Labs:      1/12/2018  Day 1   Hemoglobin 11.7 - 15.7 g/dL 10.6 (A)   Hematocrit 35.0 - 47.0 % 31.7 (A)   Platelet Count 150 - 450 10e9/L 163   Absolute Neutrophil 1.6 - 8.3 10e9/L 2.0   Sodium 133 - 144 mmol/L 138   Potassium 3.4 - 5.3 mmol/L 3.6   Chloride 94 - 109 mmol/L 104   Carbon Dioxide 20 - 32 mmol/L 25   Urea Nitrogen 7 - 30 mg/dL 14   Creatinine 0.52 - 1.04 mg/dL 0.72   Calcium 8.5 - 10.1 mg/dL 8.7   Magnesium 1.6 - 2.3 mg/dL 1.4 (A)   Bilirubin Total 0.2 - 1.3 mg/dL 0.4   ALT 0 - 50 U/L 30   AST 0 - 45 U/L 27   Alkaline Phosphatase 40 - 150 U/L 122   Albumin 3.4 - 5.0 g/dL 3.3 (A)   Protein Total 6.8 - 8.8 g/dL 7.4   WBC 4.0 - 11.0 10e9/L 4.9    pending      Assessment/Plan:  1) Recurrent stage IIIC bilateral ovarian cancer: Currently feeling well other than fatigue. Tolerating chemotherapy without dose limiting side effects, no apparent palmar plantar erythrodysesthesia. Proceed with C5D1 carboplatin/Doxil as ordered by Dr. Yeung. Avastin being added D1 and D15 per Dr. Yeung. To have CT imaging and treatment planning after cycle six with Dr. Riggins during Dr. Yeung's absence. Upon looking over her orders and being brought to my attention by pharmacy, it appears that she did not receive Zofran with her last cycle which could have contributed to her nausea on D1. Will add in Zofran 8mg IV as a pre-med. Continue with energy conservation and periods of rest/activity for fatigue management. IV magnesium replacement today- continue mag ox 400mg PO once daily and to increase dietary sources of magnesium. Urinary sxs resolved, but UA with reflex to UC placed as standing order should this recur during treatment. Reviewed risks of carboplatin reaction and s/sxs reaction and when to call her nurse/seek further care. Reviewed risks with Avastin including bleeding and GI  perforation. Reviewed signs and symptoms for when she should contact the clinic or seek additional care, including but not limited to fever, chills, inability to keep down food or fluids, nausea and vomiting not controlled with antiemetics, and diarrhea leading to dehydration. Patient to contact the clinic with any questions or concerns in the interim.   2) Genetic counseling: Testing has been performed and she is negative for mutations in BRCA1, BRCA2, EPCAM, MLH1, MSH2, MSH6, PMS2, PTEN, and TP53 genes  3) Health maintenance issues discussed include to continue following up with PCP for non-gynecologic concerns.  4) Patient verbalized understanding of and agreement with plan    A total of 20 minutes spent with patient, over 50% spent in counseling and coordination of care.    HAILE De Paz, FNP-C  Family Nurse Practitioner  Gynecologic Oncology  [facility]  Pager: [phone]

## 2018-01-12 NOTE — MR AVS SNAPSHOT
After Visit Summary   1/12/2018    Odalys Nathan    MRN: [id number]           Patient Information     Date Of Birth          1960        Visit Information        Provider Department      1/12/2018 7:40 AM Patricia Rocha APRN CNP [facility]        Today's Diagnoses     Ovarian cancer, right (H)    -  1    Ovarian cancer, left (H)        Encounter for long-term (current) use of medications        Dysuria        Drug-induced neutropenia (H)        Hypomagnesemia           Follow-ups after your visit        Your next 10 appointments already scheduled     Jan 26, 2018  9:00 AM CST   Level 2 with RH INFUSION CHAIR 3   [facility] ([facility])    [facility]  [address]  [facility] [address]   [phone]            Feb 09, 2018 10:30 AM CST   [facility] Draw with UC [facility] LAB DRAW   [facility] ([facility])    [address] [place] [address]  [address]  [facility] [address]   [phone]            Feb 09, 2018 11:00 AM CST   (Arrive by 10:45 AM)   Return Active Treatment with HAILE De Paz CNP   [facility] ([facility])    [address]  [address]  [facility] [address]   [phone]            Feb 09, 2018 12:00 PM CST   Infusion 240 with UC ONCOLOGY INFUSION, UC 15 ATC   [facility] ([facility])    [address]  [address]  [facility] [address]   [phone]            Feb 23, 2018  9:00 AM CST   Infusion 60 with UC ONCOLOGY INFUSION, UC 29 ATC   [facility] ([facility])    [address]  [address]  [facility] [address] [phone]              Future tests that were ordered for you today     Open Standing Orders        Priority Remaining Interval Expires Ordered    UA  with Microscopic reflex to Culture Routine 2/2 1/12/2019 1/12/2018            Who to contact     If you have questions or need follow up information about today's clinic visit or your schedule please contact [facility] directly at [phone].  Normal or non-critical lab and imaging results will be communicated to you by [employer], letter or phone within 4 business days after the clinic has received the results. If you do not hear from us within 7 days, please contact the clinic through [employer] or phone. If you have a critical or abnormal lab result, we will notify you by phone as soon as possible.  Submit refill requests through [employer] or call your pharmacy and they will forward the refill request to us. Please allow 3 business days for your refill to be completed.          Additional Information About Your Visit        [employer] Information     [employer] gives you secure access to your electronic health record. If you see a primary care provider, you can also send messages to your care team and make appointments. If you have questions, please call your primary care clinic.  If you do not have a primary care provider, please call [phone] and they will assist you.        Care EveryWhere ID     This is your Care EveryWhere ID. This could be used by other organizations to access your [place] medical records  [id number]        Your Vitals Were     Pulse Temperature Respirations Pulse Oximetry BMI (Body Mass Index)       89 98.9  F (37.2  C) (Oral) 16 95% 23.63 kg/m2        Blood Pressure from Last 3 Encounters:   01/12/18 121/57   12/28/17 125/58   12/14/17 112/79    Weight from Last 3 Encounters:   01/12/18 64.4 kg (142 lb)   12/14/17 65.1 kg (143 lb 9.6 oz)   11/17/17 64 kg (141 lb 1.6 oz)                 Today's Medication Changes          These changes are accurate as of: 1/12/18  2:50 PM.  If you have any questions, ask your nurse or doctor.               These medicines have changed or  have updated prescriptions.        Dose/Directions    magnesium oxide 400 MG tablet   Commonly known as:  MAG-OX   This may have changed:  when to take this   Used for:  Ovarian cancer, unspecified laterality (H)        Dose:  400 mg   Take 1 tablet (400 mg) by mouth 2 times daily   Quantity:  90 tablet   Refills:  3                Primary Care Provider Office Phone # Fax #    Aubrie Hester [phone]       [facility] [address] [address] [address] [facility] [address]        Equal Access to Services     SANDY CHAMBERS : Hadii bj ku hadasho Soomaali, waaxda luqadaha, qaybta kaalmada adeegyada, waxay cristelain haytoryn adenelson beckman . So [facility] [phone].    ATENCIÓN: Si habla español, tiene a bell disposición servicios gratuitos de asistencia lingüística. [facility] [phone].    We comply with applicable federal civil rights laws and [place] laws. We do not discriminate on the basis of race, color, national origin, age, disability, sex, sexual orientation, or gender identity.            Thank you!     Thank you for choosing [facility]  for your care. Our goal is always to provide you with excellent care. Hearing back from our patients is one way we can continue to improve our services. Please take a few minutes to complete the written survey that you may receive in the mail after your visit with us. Thank you!             Your Updated Medication List - Protect others around you: Learn how to safely use, store and throw away your medicines at www.disposemymeds.org.          This list is accurate as of: 1/12/18  2:50 PM.  Always use your most recent med list.                   Brand Name Dispense Instructions for use Diagnosis    ASPIRIN PO      Take 325 mg by mouth daily        CALCIUM + D PO      Take 1 tablet by mouth daily.    Pelvic mass       cyanocobalamin 1000 MCG/ML injection    VITAMIN B12    1 mL    Inject 1 mL (1,000 mcg) into the muscle every 30 days    B12  deficiency       diphenoxylate-atropine 2.5-0.025 MG per tablet    LOMOTIL    60 tablet    Take 2 tablets by mouth 4 times daily as needed for diarrhea    Acute diarrhea       Ferrous Sulfate 324 (65 FE) MG Tbec     90 tablet    TAKE 1 TABLET BY MOUTH 3 TIMES DAILY WITH MEALS. TAKE WITH A SMALL AMOUNT OF ORANGE JUICE. DO NOT TAKE WITH CALCIUM.    Ovarian cancer, right (H), Anemia, unspecified type, Ovarian cancer, left (H)       HERBALS      daily        iohexol 140 MG/ML Soln solution    OMNIPAQUE    140 mL    Mix entire bottle (50ml) of contast with 600ml (20 ounces) of water and drink half 2 hrs prior to CT scan and half 1 hr prior to scan    Ovarian cancer, right (H), Metastatic cancer (H)       LEVOTHYROXINE SODIUM PO      Take by mouth daily        LORazepam 1 MG tablet    ATIVAN    30 tablet    Take 1 tablet (1 mg) by mouth every 6 hours as needed (Anxiety, Nausea/Vomiting or Sleep)    Ovarian cancer, unspecified laterality (H)       magnesium oxide 400 MG tablet    MAG-OX    90 tablet    Take 1 tablet (400 mg) by mouth 2 times daily    Ovarian cancer, unspecified laterality (H)       METFORMIN HCL PO      Take 500 mg by mouth daily        order for DME     3 each    Injection Supplies for Vitamin B12: 3cc syringes w/ 27 gauge needles, 1/2 inch length    B12 deficiency       potassium chloride 20 MEQ Packet    KLOR-CON     Take 20 mEq by mouth daily        prochlorperazine 10 MG tablet    COMPAZINE    30 tablet    Take 1 tablet (10 mg) by mouth every 6 hours as needed (nausea/vomiting)    Encounter for long-term (current) use of medications, Ovarian cancer, right (H), Ovarian cancer, left (H), Drug-induced neutropenia (H)       sulfamethoxazole-trimethoprim 800-160 MG per tablet    BACTRIM DS/SEPTRA DS    14 tablet    Take 1 tablet by mouth 2 times daily    Dysuria       VITAMIN C PO      Take 500 mg by mouth daily    Pelvic mass       VITAMIN D PO      Take 1,000 Units by mouth daily Unknown dose         VITAMIN E NATURAL PO      Take 100 Units by mouth daily

## 2018-01-12 NOTE — NURSING NOTE
"Chief Complaint   Patient presents with     Port Draw     Labs drawn from port by RN. Vs taken and pt checked in for appt and vs taken. Urine collected.     Port accessed with 20g 3/4\" gripper needle by RN, labs collected, line flushed with saline and heparin.  Vitals taken. Pt checked in for appointment(s). Urine collected by RN.    Erma Gil RN  "

## 2018-01-12 NOTE — PROGRESS NOTES
Infusion Nursing Note:  Odalys Nathan presents today for Cycle 5 Day 1 Doxil, Carboplatin, Avastin.    (#16 Carbo)  Patient seen by provider today: Yes: Patricia Rocha NP   present during visit today: Not Applicable.    Note:  Magnesium replaced per protocol today.    Intravenous Access:  Implanted Port.    Treatment Conditions:  Lab Results   Component Value Date    HGB 10.6 01/12/2018     Lab Results   Component Value Date    WBC 4.9 01/12/2018      Lab Results   Component Value Date    ANEU 2.0 01/12/2018     Lab Results   Component Value Date     01/12/2018      Lab Results   Component Value Date     01/12/2018                   Lab Results   Component Value Date    POTASSIUM 3.6 01/12/2018           Lab Results   Component Value Date    MAG 1.4 01/12/2018            Lab Results   Component Value Date    CR 0.72 01/12/2018                   Lab Results   Component Value Date    JOSE ALBERTO 8.7 01/12/2018                Lab Results   Component Value Date    BILITOTAL 0.4 01/12/2018           Lab Results   Component Value Date    ALBUMIN 3.3 01/12/2018                    Lab Results   Component Value Date    ALT 30 01/12/2018           Lab Results   Component Value Date    AST 27 01/12/2018     Results reviewed, labs MET treatment parameters, ok to proceed with treatment.  Urine Protein trace.  MUGA done 9/14/17 showing EF of 54%          Post Infusion Assessment:  Patient tolerated infusion without incident.  Patient observed for 30 minutes post Carboplatin per protocol.  Blood return noted pre and post infusion.  Site patent and intact, free from redness, edema or discomfort.  No evidence of extravasations.  Access discontinued per protocol.    Discharge Plan:   Patient declined prescription refills.  AVS to patient via [employer].  Patient will return 1/26/18 for Avastin at [facility] for next appointment.   Patient discharged in stable condition accompanied by: .  Departure Mode:  Ambulatory.  Face to Face time: 0.    Hailee Haas RN

## 2018-01-26 ENCOUNTER — HOSPITAL ENCOUNTER (OUTPATIENT)
Facility: CLINIC | Age: 58
Setting detail: SPECIMEN
Discharge: HOME | End: 2018-01-26
Attending: OBSTETRICS & GYNECOLOGY | Admitting: NURSE PRACTITIONER
Payer: COMMERCIAL

## 2018-01-26 ENCOUNTER — INFUSION THERAPY VISIT (OUTPATIENT)
Dept: INFUSION THERAPY | Facility: CLINIC | Age: 58
End: 2018-01-26
Attending: OBSTETRICS & GYNECOLOGY
Payer: COMMERCIAL

## 2018-01-26 VITALS
OXYGEN SATURATION: 100 % | TEMPERATURE: 97.6 F | WEIGHT: 139.44 LBS | SYSTOLIC BLOOD PRESSURE: 119 MMHG | DIASTOLIC BLOOD PRESSURE: 94 MMHG | BODY MASS INDEX: 23.2 KG/M2 | HEART RATE: 87 BPM | RESPIRATION RATE: 16 BRPM

## 2018-01-26 DIAGNOSIS — D70.2 DRUG-INDUCED NEUTROPENIA (H): ICD-10-CM

## 2018-01-26 DIAGNOSIS — C56.2 OVARIAN CANCER, LEFT (H): ICD-10-CM

## 2018-01-26 DIAGNOSIS — C56.1 OVARIAN CANCER, RIGHT (H): ICD-10-CM

## 2018-01-26 DIAGNOSIS — Z79.899 ENCOUNTER FOR LONG-TERM (CURRENT) USE OF MEDICATIONS: Primary | ICD-10-CM

## 2018-01-26 LAB
ALBUMIN UR-MCNC: NEGATIVE MG/DL
BASOPHILS # BLD AUTO: 0 10E9/L (ref 0–0.2)
BASOPHILS NFR BLD AUTO: 0.2 %
DIFFERENTIAL METHOD BLD: ABNORMAL
EOSINOPHIL # BLD AUTO: 0.1 10E9/L (ref 0–0.7)
EOSINOPHIL NFR BLD AUTO: 1.1 %
ERYTHROCYTE [DISTWIDTH] IN BLOOD BY AUTOMATED COUNT: 13.7 % (ref 10–15)
HCT VFR BLD AUTO: 30.7 % (ref 35–47)
HGB BLD-MCNC: 10.6 G/DL (ref 11.7–15.7)
IMM GRANULOCYTES # BLD: 0 10E9/L (ref 0–0.4)
IMM GRANULOCYTES NFR BLD: 0.2 %
LYMPHOCYTES # BLD AUTO: 1.9 10E9/L (ref 0.8–5.3)
LYMPHOCYTES NFR BLD AUTO: 40.6 %
MAGNESIUM SERPL-MCNC: 1.3 MG/DL (ref 1.6–2.3)
MCH RBC QN AUTO: 40 PG (ref 26.5–33)
MCHC RBC AUTO-ENTMCNC: 34.5 G/DL (ref 31.5–36.5)
MCV RBC AUTO: 116 FL (ref 78–100)
MONOCYTES # BLD AUTO: 0.6 10E9/L (ref 0–1.3)
MONOCYTES NFR BLD AUTO: 12.7 %
NEUTROPHILS # BLD AUTO: 2.1 10E9/L (ref 1.6–8.3)
NEUTROPHILS NFR BLD AUTO: 45.2 %
NRBC # BLD AUTO: 0 10*3/UL
NRBC BLD AUTO-RTO: 0 /100
PLATELET # BLD AUTO: 61 10E9/L (ref 150–450)
POTASSIUM SERPL-SCNC: 3.8 MMOL/L (ref 3.4–5.3)
RBC # BLD AUTO: 2.65 10E12/L (ref 3.8–5.2)
WBC # BLD AUTO: 4.7 10E9/L (ref 4–11)

## 2018-01-26 PROCEDURE — 25000128 H RX IP 250 OP 636: Performed by: OBSTETRICS & GYNECOLOGY

## 2018-01-26 PROCEDURE — 81003 URINALYSIS AUTO W/O SCOPE: CPT | Performed by: NURSE PRACTITIONER

## 2018-01-26 PROCEDURE — 84132 ASSAY OF SERUM POTASSIUM: CPT | Performed by: NURSE PRACTITIONER

## 2018-01-26 PROCEDURE — 85025 COMPLETE CBC W/AUTO DIFF WBC: CPT | Performed by: NURSE PRACTITIONER

## 2018-01-26 PROCEDURE — 96365 THER/PROPH/DIAG IV INF INIT: CPT

## 2018-01-26 PROCEDURE — 83735 ASSAY OF MAGNESIUM: CPT | Performed by: NURSE PRACTITIONER

## 2018-01-26 RX ORDER — HEPARIN SODIUM (PORCINE) LOCK FLUSH IV SOLN 100 UNIT/ML 100 UNIT/ML
500 SOLUTION INTRAVENOUS EVERY 8 HOURS PRN
Status: DISCONTINUED | OUTPATIENT
Start: 2018-01-26 | End: 2018-01-26 | Stop reason: HOSPADM

## 2018-01-26 RX ADMIN — SODIUM CHLORIDE, PRESERVATIVE FREE 500 UNITS: 5 INJECTION INTRAVENOUS at 11:05

## 2018-01-26 RX ADMIN — MAGNESIUM SULFATE HEPTAHYDRATE 2 G: 500 INJECTION, SOLUTION INTRAMUSCULAR; INTRAVENOUS at 10:01

## 2018-01-26 NOTE — MR AVS SNAPSHOT
After Visit Summary   1/26/2018    Odalys Nathan    MRN: [id number]           Patient Information     Date Of Birth          1960        Visit Information        Provider Department      1/26/2018 9:00 AM RH INFUSION CHAIR 3 [facility]        Today's Diagnoses     Encounter for long-term (current) use of medications    -  1    Ovarian cancer, right (H)        Ovarian cancer, left (H)        Drug-induced neutropenia (H)           Follow-ups after your visit        Your next 10 appointments already scheduled     Feb 09, 2018 10:30 AM CST   M[facility] Lab Draw with UC MASONIC LAB DRAW   [facility] Draw ([facility])    [address] [place] [address]  [address]  [facility] [address]   [phone]            Feb 09, 2018 11:00 AM CST   (Arrive by 10:45 AM)   Return Active Treatment with HAILE De Paz CNP   [facility] ([facility])    [address] [place] [address]  [address]  [facility] [address]   [phone]            Feb 09, 2018 12:00 PM CST   Infusion 240 with UC ONCOLOGY INFUSION, UC 15 ATC   [facility] ([facility])    [address] [place] [address]  [address]  [facility] [address]   [phone]            Feb 23, 2018  8:30 AM CST   Masonic Lab Draw with UC MASONIC LAB DRAW   [facility] Draw ([facility])    [address] [place] [address]  [address]  [facility] [address]   [phone]            Feb 23, 2018  9:00 AM CST   Infusion 60 with UC ONCOLOGY INFUSION, UC 29 ATC   [facility] ([facility])    [address] [place] [address]  [address]  [facility] [address]   [phone]              Who to contact     If you have questions or need follow up information about today's clinic visit or your schedule please contact [facility]  directly at [phone].  Normal or non-critical lab and imaging results will be communicated to you by [employer], letter or phone within 4 business days after the clinic has received the results. If you do not hear from us within 7 days, please contact the clinic through [employer] or phone. If you have a critical or abnormal lab result, we will notify you by phone as soon as possible.  Submit refill requests through [employer] or call your pharmacy and they will forward the refill request to us. Please allow 3 business days for your refill to be completed.          Additional Information About Your Visit        [employer] Information     [employer] gives you secure access to your electronic health record. If you see a primary care provider, you can also send messages to your care team and make appointments. If you have questions, please call your primary care clinic.  If you do not have a primary care provider, please call [phone] and they will assist you.        Care EveryWhere ID     This is your Care EveryWhere ID. This could be used by other organizations to access your [place] medical records  [id number]        Your Vitals Were     Pulse Temperature Respirations Pulse Oximetry BMI (Body Mass Index)       87 97.6  F (36.4  C) (Oral) 16 100% 23.2 kg/m2        Blood Pressure from Last 3 Encounters:   01/26/18 (!) 119/94   01/12/18 121/57   12/28/17 125/58    Weight from Last 3 Encounters:   01/26/18 63.2 kg (139 lb 7 oz)   01/12/18 64.4 kg (142 lb)   12/14/17 65.1 kg (143 lb 9.6 oz)              We Performed the Following     CBC with platelets differential     Magnesium     Potassium     Protein qualitative urine          Today's Medication Changes          These changes are accurate as of 1/26/18 10:12 AM.  If you have any questions, ask your nurse or doctor.               These medicines have changed or have updated prescriptions.        Dose/Directions    magnesium oxide 400 MG tablet   Commonly known as:  MAG-OX    This may have changed:  when to take this   Used for:  Ovarian cancer, unspecified laterality (H)        Dose:  400 mg   Take 1 tablet (400 mg) by mouth 2 times daily   Quantity:  90 tablet   Refills:  3                Primary Care Provider Office Phone # Fax #    Aubrie Hester [phone]       [facility] [address]  [facility] [address]        Equal Access to Services     SANDY CHAMBERS : Hadii aad ku hadasho Soomaali, waaxda luqadaha, qaybta kaalmada adeegyada, waxay idiin haytoryn adeeg khlizabeth lamarcibertha ah. So [facility] [place] [phone].    ATENCIÓN: Si salas longoria, tiene a bell disposición servicios gratuitos de asistencia lingüística. Brijeshame al [phone].    We comply with applicable federal civil rights laws and [place] laws. We do not discriminate on the basis of race, color, national origin, age, disability, sex, sexual orientation, or gender identity.            Thank you!     Thank you for choosing [facility]  for your care. Our goal is always to provide you with excellent care. Hearing back from our patients is one way we can continue to improve our services. Please take a few minutes to complete the written survey that you may receive in the mail after your visit with us. Thank you!             Your Updated Medication List - Protect others around you: Learn how to safely use, store and throw away your medicines at www.disposemymeds.org.          This list is accurate as of 1/26/18 10:12 AM.  Always use your most recent med list.                   Brand Name Dispense Instructions for use Diagnosis    ASPIRIN PO      Take 325 mg by mouth daily        CALCIUM + D PO      Take 1 tablet by mouth daily.    Pelvic mass       cyanocobalamin 1000 MCG/ML injection    VITAMIN B12    1 mL    Inject 1 mL (1,000 mcg) into the muscle every 30 days    B12 deficiency       diphenoxylate-atropine 2.5-0.025 MG per tablet    LOMOTIL    60 tablet    Take 2  tablets by mouth 4 times daily as needed for diarrhea    Acute diarrhea       Ferrous Sulfate 324 (65 FE) MG Tbec     90 tablet    TAKE 1 TABLET BY MOUTH 3 TIMES DAILY WITH MEALS. TAKE WITH A SMALL AMOUNT OF ORANGE JUICE. DO NOT TAKE WITH CALCIUM.    Ovarian cancer, right (H), Anemia, unspecified type, Ovarian cancer, left (H)       HERBALS      daily        iohexol 140 MG/ML Soln solution    OMNIPAQUE    140 mL    Mix entire bottle (50ml) of contast with 600ml (20 ounces) of water and drink half 2 hrs prior to CT scan and half 1 hr prior to scan    Ovarian cancer, right (H), Metastatic cancer (H)       LEVOTHYROXINE SODIUM PO      Take by mouth daily        LORazepam 1 MG tablet    ATIVAN    30 tablet    Take 1 tablet (1 mg) by mouth every 6 hours as needed (Anxiety, Nausea/Vomiting or Sleep)    Ovarian cancer, unspecified laterality (H)       magnesium oxide 400 MG tablet    MAG-OX    90 tablet    Take 1 tablet (400 mg) by mouth 2 times daily    Ovarian cancer, unspecified laterality (H)       METFORMIN HCL PO      Take 500 mg by mouth daily        order for DME     3 each    Injection Supplies for Vitamin B12: 3cc syringes w/ 27 gauge needles, 1/2 inch length    B12 deficiency       potassium chloride 20 MEQ Packet    KLOR-CON     Take 20 mEq by mouth daily        prochlorperazine 10 MG tablet    COMPAZINE    30 tablet    Take 1 tablet (10 mg) by mouth every 6 hours as needed (nausea/vomiting)    Encounter for long-term (current) use of medications, Ovarian cancer, right (H), Ovarian cancer, left (H), Drug-induced neutropenia (H)       sulfamethoxazole-trimethoprim 800-160 MG per tablet    BACTRIM DS/SEPTRA DS    14 tablet    Take 1 tablet by mouth 2 times daily    Dysuria       VITAMIN C PO      Take 500 mg by mouth daily    Pelvic mass       VITAMIN D PO      Take 1,000 Units by mouth daily Unknown dose        VITAMIN E NATURAL PO      Take 100 Units by mouth daily

## 2018-01-26 NOTE — PROGRESS NOTES
Infusion Nursing Note:  Odalys Nathan presents today for Avastin C5 D14--HELD.    Patient seen by provider today: No   present during visit today: Not Applicable.    Note: Avastin held due to platelet count of 61. Magnesium replaced for a level of 1.3.    Intravenous Access:  Labs drawn without difficulty.  Implanted Port.    Treatment Conditions:  Lab Results   Component Value Date    HGB 10.6 01/26/2018     Lab Results   Component Value Date    WBC 4.7 01/26/2018      Lab Results   Component Value Date    ANEU 2.1 01/26/2018     Lab Results   Component Value Date    PLT 61 01/26/2018      Results reviewed, labs did NOT meet treatment parameters: Plt 61.    Post Infusion Assessment:  Patient tolerated infusion without incident.  Blood return noted pre and post infusion.  Site patent and intact, free from redness, edema or discomfort.  No evidence of extravasations.  Access discontinued per protocol.    Discharge Plan:   Patient declined prescription refills.  Discharge instructions reviewed with: Patient.  Patient and/or family verbalized understanding of discharge instructions and all questions answered.  Copy of AVS reviewed with patient and/or family.  Patient will return to the [facility] for remaining chemo; no current appointments scheduled for this infusion center.  Patient discharged in stable condition accompanied by: .  Departure Mode: Ambulatory.    Elly Mcfadden RN

## 2018-02-02 DIAGNOSIS — C56.1 OVARIAN CANCER, RIGHT (H): ICD-10-CM

## 2018-02-02 DIAGNOSIS — D64.9 ANEMIA, UNSPECIFIED TYPE: ICD-10-CM

## 2018-02-02 DIAGNOSIS — C56.2 OVARIAN CANCER, LEFT (H): ICD-10-CM

## 2018-02-02 RX ORDER — FERROUS SULFATE 324(65)MG
TABLET, DELAYED RELEASE (ENTERIC COATED) ORAL
Qty: 90 TABLET | Refills: 1 | Status: SHIPPED | OUTPATIENT
Start: 2018-02-02 | End: 2018-05-02

## 2018-02-09 ENCOUNTER — INFUSION THERAPY VISIT (OUTPATIENT)
Dept: ONCOLOGY | Facility: CLINIC | Age: 58
End: 2018-02-09
Attending: OBSTETRICS & GYNECOLOGY
Payer: COMMERCIAL

## 2018-02-09 ENCOUNTER — APPOINTMENT (OUTPATIENT)
Dept: LAB | Facility: CLINIC | Age: 58
End: 2018-02-09
Attending: OBSTETRICS & GYNECOLOGY
Payer: COMMERCIAL

## 2018-02-09 VITALS
SYSTOLIC BLOOD PRESSURE: 123 MMHG | WEIGHT: 141.1 LBS | DIASTOLIC BLOOD PRESSURE: 79 MMHG | OXYGEN SATURATION: 100 % | TEMPERATURE: 97.9 F | BODY MASS INDEX: 23.48 KG/M2 | HEART RATE: 94 BPM

## 2018-02-09 DIAGNOSIS — C56.2 OVARIAN CANCER, LEFT (H): ICD-10-CM

## 2018-02-09 DIAGNOSIS — Z79.899 ENCOUNTER FOR LONG-TERM (CURRENT) USE OF MEDICATIONS: Primary | ICD-10-CM

## 2018-02-09 DIAGNOSIS — E83.42 HYPOMAGNESEMIA: ICD-10-CM

## 2018-02-09 DIAGNOSIS — D70.2 DRUG-INDUCED NEUTROPENIA (H): ICD-10-CM

## 2018-02-09 DIAGNOSIS — Z79.899 ENCOUNTER FOR LONG-TERM (CURRENT) USE OF MEDICATIONS: ICD-10-CM

## 2018-02-09 DIAGNOSIS — C56.1 OVARIAN CANCER, RIGHT (H): ICD-10-CM

## 2018-02-09 DIAGNOSIS — C56.1 OVARIAN CANCER, RIGHT (H): Primary | ICD-10-CM

## 2018-02-09 LAB
ALBUMIN SERPL-MCNC: 3.3 G/DL (ref 3.4–5)
ALBUMIN UR-MCNC: NEGATIVE MG/DL
ALP SERPL-CCNC: 124 U/L (ref 40–150)
ALT SERPL W P-5'-P-CCNC: 25 U/L (ref 0–50)
ANION GAP SERPL CALCULATED.3IONS-SCNC: 9 MMOL/L (ref 3–14)
AST SERPL W P-5'-P-CCNC: 19 U/L (ref 0–45)
BASOPHILS # BLD AUTO: 0 10E9/L (ref 0–0.2)
BASOPHILS NFR BLD AUTO: 0.5 %
BILIRUB SERPL-MCNC: 0.3 MG/DL (ref 0.2–1.3)
BUN SERPL-MCNC: 18 MG/DL (ref 7–30)
CALCIUM SERPL-MCNC: 8.7 MG/DL (ref 8.5–10.1)
CANCER AG125 SERPL-ACNC: 71 U/ML (ref 0–30)
CHLORIDE SERPL-SCNC: 106 MMOL/L (ref 94–109)
CO2 SERPL-SCNC: 24 MMOL/L (ref 20–32)
CREAT SERPL-MCNC: 0.76 MG/DL (ref 0.52–1.04)
DIFFERENTIAL METHOD BLD: ABNORMAL
EOSINOPHIL # BLD AUTO: 0 10E9/L (ref 0–0.7)
EOSINOPHIL NFR BLD AUTO: 1 %
ERYTHROCYTE [DISTWIDTH] IN BLOOD BY AUTOMATED COUNT: 14.9 % (ref 10–15)
GFR SERPL CREATININE-BSD FRML MDRD: 78 ML/MIN/1.7M2
GLUCOSE SERPL-MCNC: 106 MG/DL (ref 70–99)
HCT VFR BLD AUTO: 32.4 % (ref 35–47)
HGB BLD-MCNC: 11.1 G/DL (ref 11.7–15.7)
IMM GRANULOCYTES # BLD: 0.1 10E9/L (ref 0–0.4)
IMM GRANULOCYTES NFR BLD: 1.2 %
LYMPHOCYTES # BLD AUTO: 1.5 10E9/L (ref 0.8–5.3)
LYMPHOCYTES NFR BLD AUTO: 37.6 %
MAGNESIUM SERPL-MCNC: 1.4 MG/DL (ref 1.6–2.3)
MCH RBC QN AUTO: 39.9 PG (ref 26.5–33)
MCHC RBC AUTO-ENTMCNC: 34.3 G/DL (ref 31.5–36.5)
MCV RBC AUTO: 117 FL (ref 78–100)
MONOCYTES # BLD AUTO: 0.6 10E9/L (ref 0–1.3)
MONOCYTES NFR BLD AUTO: 13.8 %
NEUTROPHILS # BLD AUTO: 1.9 10E9/L (ref 1.6–8.3)
NEUTROPHILS NFR BLD AUTO: 45.9 %
NRBC # BLD AUTO: 0 10*3/UL
NRBC BLD AUTO-RTO: 0 /100
PLATELET # BLD AUTO: 120 10E9/L (ref 150–450)
POTASSIUM SERPL-SCNC: 3.4 MMOL/L (ref 3.4–5.3)
PROT SERPL-MCNC: 7.2 G/DL (ref 6.8–8.8)
RBC # BLD AUTO: 2.78 10E12/L (ref 3.8–5.2)
SODIUM SERPL-SCNC: 139 MMOL/L (ref 133–144)
WBC # BLD AUTO: 4.1 10E9/L (ref 4–11)

## 2018-02-09 PROCEDURE — 80053 COMPREHEN METABOLIC PANEL: CPT | Performed by: NURSE PRACTITIONER

## 2018-02-09 PROCEDURE — 96417 CHEMO IV INFUS EACH ADDL SEQ: CPT

## 2018-02-09 PROCEDURE — 85025 COMPLETE CBC W/AUTO DIFF WBC: CPT | Performed by: NURSE PRACTITIONER

## 2018-02-09 PROCEDURE — 86304 IMMUNOASSAY TUMOR CA 125: CPT | Performed by: NURSE PRACTITIONER

## 2018-02-09 PROCEDURE — 96375 TX/PRO/DX INJ NEW DRUG ADDON: CPT

## 2018-02-09 PROCEDURE — 25000128 H RX IP 250 OP 636: Mod: ZF | Performed by: OBSTETRICS & GYNECOLOGY

## 2018-02-09 PROCEDURE — 99213 OFFICE O/P EST LOW 20 MIN: CPT | Mod: ZP | Performed by: NURSE PRACTITIONER

## 2018-02-09 PROCEDURE — 96413 CHEMO IV INFUSION 1 HR: CPT

## 2018-02-09 PROCEDURE — G0463 HOSPITAL OUTPT CLINIC VISIT: HCPCS | Mod: ZF

## 2018-02-09 PROCEDURE — 25000128 H RX IP 250 OP 636: Mod: ZF | Performed by: NURSE PRACTITIONER

## 2018-02-09 PROCEDURE — 81003 URINALYSIS AUTO W/O SCOPE: CPT | Performed by: NURSE PRACTITIONER

## 2018-02-09 PROCEDURE — 83735 ASSAY OF MAGNESIUM: CPT | Performed by: NURSE PRACTITIONER

## 2018-02-09 RX ORDER — LORAZEPAM 2 MG/ML
1 INJECTION INTRAMUSCULAR EVERY 6 HOURS PRN
Status: CANCELLED
Start: 2018-02-09

## 2018-02-09 RX ORDER — HEPARIN SODIUM (PORCINE) LOCK FLUSH IV SOLN 100 UNIT/ML 100 UNIT/ML
5 SOLUTION INTRAVENOUS ONCE
Status: COMPLETED | OUTPATIENT
Start: 2018-02-09 | End: 2018-02-09

## 2018-02-09 RX ORDER — LORAZEPAM 2 MG/ML
1 INJECTION INTRAMUSCULAR EVERY 6 HOURS PRN
Status: CANCELLED
Start: 2018-02-22

## 2018-02-09 RX ORDER — ALBUTEROL SULFATE 90 UG/1
1-2 AEROSOL, METERED RESPIRATORY (INHALATION)
Status: CANCELLED
Start: 2018-02-22

## 2018-02-09 RX ORDER — ONDANSETRON 2 MG/ML
8 INJECTION INTRAMUSCULAR; INTRAVENOUS ONCE
Status: COMPLETED | OUTPATIENT
Start: 2018-02-09 | End: 2018-02-09

## 2018-02-09 RX ORDER — DIPHENHYDRAMINE HYDROCHLORIDE 50 MG/ML
50 INJECTION INTRAMUSCULAR; INTRAVENOUS
Status: CANCELLED
Start: 2018-02-22

## 2018-02-09 RX ORDER — METHYLPREDNISOLONE SODIUM SUCCINATE 125 MG/2ML
125 INJECTION, POWDER, LYOPHILIZED, FOR SOLUTION INTRAMUSCULAR; INTRAVENOUS
Status: CANCELLED
Start: 2018-02-09

## 2018-02-09 RX ORDER — EPINEPHRINE 1 MG/ML
0.3 INJECTION, SOLUTION, CONCENTRATE INTRAVENOUS EVERY 5 MIN PRN
Status: CANCELLED | OUTPATIENT
Start: 2018-02-09

## 2018-02-09 RX ORDER — MEPERIDINE HYDROCHLORIDE 25 MG/ML
25 INJECTION INTRAMUSCULAR; INTRAVENOUS; SUBCUTANEOUS EVERY 30 MIN PRN
Status: CANCELLED | OUTPATIENT
Start: 2018-02-09

## 2018-02-09 RX ORDER — SODIUM CHLORIDE 9 MG/ML
1000 INJECTION, SOLUTION INTRAVENOUS CONTINUOUS PRN
Status: CANCELLED
Start: 2018-02-22

## 2018-02-09 RX ORDER — EPINEPHRINE 0.3 MG/.3ML
0.3 INJECTION SUBCUTANEOUS EVERY 5 MIN PRN
Status: CANCELLED | OUTPATIENT
Start: 2018-02-09

## 2018-02-09 RX ORDER — HEPARIN SODIUM (PORCINE) LOCK FLUSH IV SOLN 100 UNIT/ML 100 UNIT/ML
500 SOLUTION INTRAVENOUS EVERY 8 HOURS
Status: DISCONTINUED | OUTPATIENT
Start: 2018-02-09 | End: 2018-02-09 | Stop reason: HOSPADM

## 2018-02-09 RX ORDER — EPINEPHRINE 1 MG/ML
0.3 INJECTION, SOLUTION, CONCENTRATE INTRAVENOUS EVERY 5 MIN PRN
Status: CANCELLED | OUTPATIENT
Start: 2018-02-22

## 2018-02-09 RX ORDER — ALBUTEROL SULFATE 90 UG/1
1-2 AEROSOL, METERED RESPIRATORY (INHALATION)
Status: CANCELLED
Start: 2018-02-09

## 2018-02-09 RX ORDER — MEPERIDINE HYDROCHLORIDE 25 MG/ML
25 INJECTION INTRAMUSCULAR; INTRAVENOUS; SUBCUTANEOUS EVERY 30 MIN PRN
Status: CANCELLED | OUTPATIENT
Start: 2018-02-22

## 2018-02-09 RX ORDER — ALBUTEROL SULFATE 0.83 MG/ML
2.5 SOLUTION RESPIRATORY (INHALATION)
Status: CANCELLED | OUTPATIENT
Start: 2018-02-22

## 2018-02-09 RX ORDER — ONDANSETRON 2 MG/ML
8 INJECTION INTRAMUSCULAR; INTRAVENOUS ONCE
Status: CANCELLED
Start: 2018-02-09 | End: 2018-02-09

## 2018-02-09 RX ORDER — DIPHENHYDRAMINE HYDROCHLORIDE 50 MG/ML
50 INJECTION INTRAMUSCULAR; INTRAVENOUS
Status: CANCELLED
Start: 2018-02-09

## 2018-02-09 RX ORDER — ALBUTEROL SULFATE 0.83 MG/ML
2.5 SOLUTION RESPIRATORY (INHALATION)
Status: CANCELLED | OUTPATIENT
Start: 2018-02-09

## 2018-02-09 RX ORDER — METHYLPREDNISOLONE SODIUM SUCCINATE 125 MG/2ML
125 INJECTION, POWDER, LYOPHILIZED, FOR SOLUTION INTRAMUSCULAR; INTRAVENOUS
Status: CANCELLED
Start: 2018-02-22

## 2018-02-09 RX ORDER — SODIUM CHLORIDE 9 MG/ML
1000 INJECTION, SOLUTION INTRAVENOUS CONTINUOUS PRN
Status: CANCELLED
Start: 2018-02-09

## 2018-02-09 RX ORDER — MAGNESIUM SULFATE HEPTAHYDRATE 40 MG/ML
2 INJECTION, SOLUTION INTRAVENOUS ONCE
Status: COMPLETED | OUTPATIENT
Start: 2018-02-09 | End: 2018-02-09

## 2018-02-09 RX ORDER — HEPARIN SODIUM (PORCINE) LOCK FLUSH IV SOLN 100 UNIT/ML 100 UNIT/ML
500 SOLUTION INTRAVENOUS EVERY 8 HOURS
Status: CANCELLED | OUTPATIENT
Start: 2018-02-09

## 2018-02-09 RX ORDER — EPINEPHRINE 0.3 MG/.3ML
0.3 INJECTION SUBCUTANEOUS EVERY 5 MIN PRN
Status: CANCELLED | OUTPATIENT
Start: 2018-02-22

## 2018-02-09 RX ORDER — EPINEPHRINE 0.3 MG/.3ML
INJECTION SUBCUTANEOUS
Status: DISCONTINUED
Start: 2018-02-09 | End: 2018-02-09 | Stop reason: WASHOUT

## 2018-02-09 RX ADMIN — MAGNESIUM SULFATE IN WATER 2 G: 40 INJECTION, SOLUTION INTRAVENOUS at 12:55

## 2018-02-09 RX ADMIN — BEVACIZUMAB 600 MG: 400 INJECTION, SOLUTION INTRAVENOUS at 14:55

## 2018-02-09 RX ADMIN — DEXTROSE 500 ML: 5 SOLUTION INTRAVENOUS at 12:24

## 2018-02-09 RX ADMIN — SODIUM CHLORIDE 250 ML: 9 INJECTION, SOLUTION INTRAVENOUS at 14:33

## 2018-02-09 RX ADMIN — ONDANSETRON 8 MG: 2 INJECTION INTRAMUSCULAR; INTRAVENOUS at 12:25

## 2018-02-09 RX ADMIN — DEXAMETHASONE SODIUM PHOSPHATE: 10 INJECTION, SOLUTION INTRAMUSCULAR; INTRAVENOUS at 12:25

## 2018-02-09 RX ADMIN — SODIUM CHLORIDE, PRESERVATIVE FREE 500 UNITS: 5 INJECTION INTRAVENOUS at 15:28

## 2018-02-09 RX ADMIN — SODIUM CHLORIDE, PRESERVATIVE FREE 5 ML: 5 INJECTION INTRAVENOUS at 10:50

## 2018-02-09 RX ADMIN — DOXORUBICIN HYDROCHLORIDE 50 MG: 2 INJECTABLE, LIPOSOMAL INTRAVENOUS at 12:43

## 2018-02-09 RX ADMIN — CARBOPLATIN 530 MG: 10 INJECTION, SOLUTION INTRAVENOUS at 13:46

## 2018-02-09 ASSESSMENT — ENCOUNTER SYMPTOMS
DECREASED LIBIDO: 0
POOR WOUND HEALING: 0
WEAKNESS: 0
FATIGUE: 1
FLANK PAIN: 0
DYSPNEA ON EXERTION: 0
SEIZURES: 0
EYE REDNESS: 0
DIZZINESS: 0
BOWEL INCONTINENCE: 0
SORE THROAT: 0
WHEEZING: 0
INCREASED ENERGY: 1
CHILLS: 0
POLYDIPSIA: 0
POSTURAL DYSPNEA: 0
JOINT SWELLING: 0
SYNCOPE: 0
JAUNDICE: 0
DECREASED APPETITE: 0
PALPITATIONS: 0
DIFFICULTY URINATING: 0
HEADACHES: 0
SPUTUM PRODUCTION: 0
EYE WATERING: 0
COUGH DISTURBING SLEEP: 0
NERVOUS/ANXIOUS: 0
HOARSE VOICE: 0
SWOLLEN GLANDS: 0
INCREASED ENERGY: 0
HYPOTENSION: 0
BREAST PAIN: 0
TINGLING: 0
FEVER: 0
CONSTIPATION: 0
HEMATURIA: 0
BRUISES/BLEEDS EASILY: 0
NECK MASS: 0
DEPRESSION: 0
TROUBLE SWALLOWING: 0
SKIN CHANGES: 0
ABDOMINAL PAIN: 0
SINUS CONGESTION: 0
NAUSEA: 0
SHORTNESS OF BREATH: 0
SINUS PAIN: 0
TACHYCARDIA: 0
MEMORY LOSS: 0
MUSCLE CRAMPS: 0
HEARTBURN: 0
DISTURBANCES IN COORDINATION: 0
MYALGIAS: 0
HYPERTENSION: 0
NIGHT SWEATS: 0
TREMORS: 0
WEIGHT LOSS: 0
HEMOPTYSIS: 0
LEG PAIN: 0
DYSURIA: 0
TASTE DISTURBANCE: 0
ALTERED TEMPERATURE REGULATION: 0
DOUBLE VISION: 0
ARTHRALGIAS: 0
PANIC: 0
NAIL CHANGES: 0
PARALYSIS: 0
HOT FLASHES: 0
HALLUCINATIONS: 0
CLAUDICATION: 0
SNORES LOUDLY: 0
BLOATING: 0
DECREASED CONCENTRATION: 0
DIARRHEA: 1
LEG SWELLING: 0
RECTAL PAIN: 0
STIFFNESS: 0
RECTAL BLEEDING: 0
COUGH: 0
EYE PAIN: 0
POLYPHAGIA: 0
SLEEP DISTURBANCES DUE TO BREATHING: 0
MUSCLE WEAKNESS: 0
LOSS OF CONSCIOUSNESS: 0
EXTREMITY NUMBNESS: 0
BREAST MASS: 0
VOMITING: 0
BACK PAIN: 0
LIGHT-HEADEDNESS: 0
EXERCISE INTOLERANCE: 0
EYE IRRITATION: 0
NECK PAIN: 0
SPEECH CHANGE: 0
SMELL DISTURBANCE: 0
INSOMNIA: 0
WEIGHT GAIN: 0
ORTHOPNEA: 0
BLOOD IN STOOL: 0
RESPIRATORY PAIN: 0
NUMBNESS: 0

## 2018-02-09 ASSESSMENT — PAIN SCALES - GENERAL: PAINLEVEL: NO PAIN (0)

## 2018-02-09 NOTE — MR AVS SNAPSHOT
After Visit Summary   2/9/2018    Odalys Nathan    MRN: [id number]           Patient Information     Date Of Birth          1960        Visit Information        Provider Department      2/9/2018 12:00 PM  15 ATC;  ONCOLOGY INFUSION [facility]        Today's Diagnoses     Encounter for long-term (current) use of medications    -  1    Ovarian cancer, right (H)        Ovarian cancer, left (H)        Drug-induced neutropenia (H)          Care Instructions    Patricia checkout note to schedulers:  Will need CT CAP with contrast before [facility] visit. Please switch her 2/23 labs/Avastin to [facility]. Can she get IV fluids at [facility] on 2/16?      Contact Numbers  [facility]: [phone]    After Hours:  [phone]  Triage: [phone]    Please call the [facility] Triage line if you experience a temperature greater than or equal to 100.5, shaking chills, have uncontrolled nausea, vomiting and/or diarrhea, dizziness, shortness of breath, chest pain, bleeding, unexplained bruising, or if you have any other new/concerning symptoms, questions or concerns.     If it is after hours, weekends, or holidays, please call the main hospital  at  [phone] and ask to speak to the Oncology doctor on call.     If you are having any concerning symptoms or wish to speak to a provider before your next infusion visit, please call your care coordinator or triage to notify them so we can adequately serve you.     If you need a refill on a narcotic prescription or other medication, please call triage before your infusion appointment.         February 2018 Sunday Monday Tuesday Wednesday Thursday Friday Saturday                       1     2     3       4     5     6     7     8     9     [facility] MASONIC LAB DRAW   10:30 AM   (15 min.)    [facility] LAB DRAW   [facility] Mason[facility] Lab Draw     [facility] RETURN ACTIVE TREATMENT   10:45 AM   (40 min.)   Patricia Rocha, HAILE HAYES   [facility]  [facility]     [facility] ONC INFUSION 240   12:00 PM   (240 min.)   [facility] ONCOLOGY INFUSION   [facility] 10       11     12     13     14     15     16     LEVEL 2   12:30 PM   (120 min.)   RH INFUSION CHAIR 5   [facility] 17       18     19     20     21     22     23     LEVEL 2   10:00 AM   (120 min.)   RH INFUSION CHAIR 8   [facility] 24       25     26 27 28 March 2018 Sunday Monday Tuesday Wednesday Thursday Friday Saturday                       1     2     CT CHEST/ABDOMEN/PELVIS W   12:25 PM   (20 min.)   UCCT2   [facility] CT 3       4     5     [facility] RETURN    4:25 PM   (20 min.)   Garry Riggins MD   [facility] 6     7     8     9     10       11     12     13     14     15     16     17       18     19     20     21     22     23     24       25     26     27     28     29     30     31                 Recent Results (from the past 24 hour(s))   Magnesium    Collection Time: 02/09/18 11:00 AM   Result Value Ref Range    Magnesium 1.4 (L) 1.6 - 2.3 mg/dL   Comprehensive metabolic panel    Collection Time: 02/09/18 11:00 AM   Result Value Ref Range    Sodium 139 133 - 144 mmol/L    Potassium 3.4 3.4 - 5.3 mmol/L    Chloride 106 94 - 109 mmol/L    Carbon Dioxide 24 20 - 32 mmol/L    Anion Gap 9 3 - 14 mmol/L    Glucose 106 (H) 70 - 99 mg/dL    Urea Nitrogen 18 7 - 30 mg/dL    Creatinine 0.76 0.52 - 1.04 mg/dL    GFR Estimate 78 >60 mL/min/1.7m2    GFR Estimate If Black >90 >60 mL/min/1.7m2    Calcium 8.7 8.5 - 10.1 mg/dL    Bilirubin Total 0.3 0.2 - 1.3 mg/dL    Albumin 3.3 (L) 3.4 - 5.0 g/dL    Protein Total 7.2 6.8 - 8.8 g/dL    Alkaline Phosphatase 124 40 - 150 U/L    ALT 25 0 - 50 U/L    AST 19 0 - 45 U/L   CBC with platelets differential    Collection Time: 02/09/18 11:00 AM   Result Value Ref Range    WBC 4.1 4.0 - 11.0 10e9/L    RBC  Count 2.78 (L) 3.8 - 5.2 10e12/L    Hemoglobin 11.1 (L) 11.7 - 15.7 g/dL    Hematocrit 32.4 (L) 35.0 - 47.0 %     (H) 78 - 100 fl    MCH 39.9 (H) 26.5 - 33.0 pg    MCHC 34.3 31.5 - 36.5 g/dL    RDW 14.9 10.0 - 15.0 %    Platelet Count 120 (L) 150 - 450 10e9/L    Diff Method Automated Method     % Neutrophils 45.9 %    % Lymphocytes 37.6 %    % Monocytes 13.8 %    % Eosinophils 1.0 %    % Basophils 0.5 %    % Immature Granulocytes 1.2 %    Nucleated RBCs 0 0 /100    Absolute Neutrophil 1.9 1.6 - 8.3 10e9/L    Absolute Lymphocytes 1.5 0.8 - 5.3 10e9/L    Absolute Monocytes 0.6 0.0 - 1.3 10e9/L    Absolute Eosinophils 0.0 0.0 - 0.7 10e9/L    Absolute Basophils 0.0 0.0 - 0.2 10e9/L    Abs Immature Granulocytes 0.1 0 - 0.4 10e9/L    Absolute Nucleated RBC 0.0    Protein qualitative urine    Collection Time: 02/09/18 11:05 AM   Result Value Ref Range    Protein Albumin Urine Negative NEG^Negative mg/dL                 Follow-ups after your visit        Your next 10 appointments already scheduled     Feb 16, 2018 12:30 PM CST   Level 2 with RH INFUSION CHAIR 5    Infusion Services ([facility])    [facility]  [address]  [place] [address]   [phone]            Feb 23, 2018 10:00 AM CST   Level 2 with RH INFUSION CHAIR 8    Infusion Services ([facility])    [facility]  [address]  [place] [address]   [phone]            Mar 02, 2018 12:40 PM CST   (Arrive by 12:25 PM)   CT CHEST/ABDOMEN/PELVIS W CONTRAST with UCCT2   [facility] ([facility])    [address]  [address] [address]   [phone]           Please bring any scans or X-rays taken at other hospitals, if similar tests were done. Also bring a list of your medicines, including vitamins, minerals and over-the-counter drugs.  It is safest to leave personal items at home.  Be sure to tell your doctor:   If you have any allergies.   If there s any chance you are pregnant.   If you are breastfeeding.  You may have contrast for this exam. To prepare:   Do not eat or drink for 2 hours before your exam. If you need to take medicine, you may take it with small sips of water. (We may ask you to take liquid medicine as well.)   The day before your exam, drink extra fluids at least six 8-ounce glasses (unless your doctor tells you to restrict your fluids).   You will be given instructions on how to drink the contrast.  Patients over 70 or patients with diabetes or kidney problems:   If you haven t had a blood test (creatinine test) within the last 30 days, the Cardiologist/Radiologist may require you to get this test prior to your exam.  If you have diabetes:   Continue to take your metformin medication on the day of your exam  Please wear loose clothing, such as a sweat suit or jogging clothes. Avoid snaps, zippers and other metal. We may ask you to undress and put on a hospital gown.  If you have any questions, please call the Imaging Department where you will have your exam.            Mar 05, 2018  4:40 PM CST   (Arrive by 4:25 PM)   Return Visit with Garry Riggins MD   [facility] ([facility])    [address] [place] [address]  [address] [place] [address] [phone]              Future tests that were ordered for you today     Open Future Orders        Priority Expected Expires Ordered    CT Chest/Abdomen/Pelvis w Contrast Routine  2/9/2019 2/9/2018     Routine 3/1/2018 2/9/2019 2/9/2018    CT Chest/Abdomen/Pelvis w Contrast Routine  2/9/2019 2/9/2018            Who to contact     If you have questions or need follow up information about today's clinic visit or your schedule please contact [facility] directly at [phone].  Normal or non-critical lab and imaging  results will be communicated to you by COFCOhart, letter or phone within 4 business days after the clinic has received the results. If you do not hear from us within 7 days, please contact the clinic through Mobile Messenger or phone. If you have a critical or abnormal lab result, we will notify you by phone as soon as possible.  Submit refill requests through Mobile Messenger or call your pharmacy and they will forward the refill request to us. Please allow 3 business days for your refill to be completed.          Additional Information About Your Visit        Mobile Messenger Information     Mobile Messenger gives you secure access to your electronic health record. If you see a primary care provider, you can also send messages to your care team and make appointments. If you have questions, please call your primary care clinic.  If you do not have a primary care provider, please call [phone] and they will assist you.        Care EveryWhere ID     This is your Care EveryWhere ID. This could be used by other organizations to access your [facility] medical records  [id number]         Blood Pressure from Last 3 Encounters:   02/09/18 123/79   01/26/18 (!) 119/94   01/12/18 121/57    Weight from Last 3 Encounters:   02/09/18 64 kg (141 lb 1.6 oz)   01/26/18 63.2 kg (139 lb 7 oz)   01/12/18 64.4 kg (142 lb)              We Performed the Following          CBC with platelets differential     Comprehensive metabolic panel     Magnesium     Protein qualitative urine          Today's Medication Changes          These changes are accurate as of 2/9/18  1:20 PM.  If you have any questions, ask your nurse or doctor.               These medicines have changed or have updated prescriptions.        Dose/Directions    * iohexol 140 MG/ML Soln solution   Commonly known as:  OMNIPAQUE   This may have changed:  Another medication with the same name was added. Make sure you understand how and when to take each.   Used for:  Ovarian cancer, right (H), Metastatic  cancer (H)   Changed by:  Tammy Flores, RN        Mix entire bottle (50ml) of contast with 600ml (20 ounces) of water and drink half 2 hrs prior to CT scan and half 1 hr prior to scan   Quantity:  140 mL   Refills:  0       * iohexol 140 MG/ML Soln solution   Commonly known as:  OMNIPAQUE   This may have changed:  You were already taking a medication with the same name, and this prescription was added. Make sure you understand how and when to take each.   Used for:  Ovarian cancer, right (H)   Changed by:  Tammy Flores RN        Dose:  50 mL   Take 50 mLs by mouth once for 1 dose   Quantity:  50 mL   Refills:  3       magnesium oxide 400 MG tablet   Commonly known as:  MAG-OX   This may have changed:  when to take this   Used for:  Ovarian cancer, unspecified laterality (H)        Dose:  400 mg   Take 1 tablet (400 mg) by mouth 2 times daily   Quantity:  90 tablet   Refills:  3       * Notice:  This list has 2 medication(s) that are the same as other medications prescribed for you. Read the directions carefully, and ask your doctor or other care provider to review them with you.         Where to get your medicines      These medications were sent to [address] [place] [address] [address] [place], [place] [id number]    Hours:  TRANSPLANT PHONE NUMBER [phone] Phone:  [phone]     iohexol 140 MG/ML Soln solution                Primary Care Provider Office Phone # Fax #    Aubrie Brooksjethro [phone]       [facility] [address] [facility] [address]        Equal Access to Services     MATEO CHAMBERS AH: Hadii bj santana hadasho Soomaali, waaxda luqadaha, qaybta kaalmada adeegyada, blanka szymanski. So [facility] [phone].    ATENCIÓN: Si habla español, tiene a bell disposición servicios gratuitos de asistencia lingüística. Llame al [phone].    We comply with applicable federal civil rights  laws and [place] laws. We do not discriminate on the basis of race, color, national origin, age, disability, sex, sexual orientation, or gender identity.            Thank you!     Thank you for choosing [facility]  for your care. Our goal is always to provide you with excellent care. Hearing back from our patients is one way we can continue to improve our services. Please take a few minutes to complete the written survey that you may receive in the mail after your visit with us. Thank you!             Your Updated Medication List - Protect others around you: Learn how to safely use, store and throw away your medicines at www.disposemymeds.org.          This list is accurate as of 2/9/18  1:20 PM.  Always use your most recent med list.                   Brand Name Dispense Instructions for use Diagnosis    ASPIRIN PO      Take 325 mg by mouth daily        CALCIUM + D PO      Take 1 tablet by mouth daily.    Pelvic mass       cyanocobalamin 1000 MCG/ML injection    VITAMIN B12    1 mL    Inject 1 mL (1,000 mcg) into the muscle every 30 days    B12 deficiency       diphenoxylate-atropine 2.5-0.025 MG per tablet    LOMOTIL    60 tablet    Take 2 tablets by mouth 4 times daily as needed for diarrhea    Acute diarrhea       Ferrous Sulfate 324 (65 FE) MG Tbec     90 tablet    TAKE 1 TABLET BY MOUTH 3 TIMES DAILY WITH MEALS. TAKE WITH A SMALL AMOUNT OF ORANGE JUICE. DO NOT TAKE WITH CALCIUM.    Ovarian cancer, right (H), Anemia, unspecified type, Ovarian cancer, left (H)       HERBALS      daily        * iohexol 140 MG/ML Soln solution    OMNIPAQUE    140 mL    Mix entire bottle (50ml) of contast with 600ml (20 ounces) of water and drink half 2 hrs prior to CT scan and half 1 hr prior to scan    Ovarian cancer, right (H), Metastatic cancer (H)       * iohexol 140 MG/ML Soln solution    OMNIPAQUE    50 mL    Take 50 mLs by mouth once for 1 dose    Ovarian cancer, right (H)       LEVOTHYROXINE SODIUM PO       Take by mouth daily        LORazepam 1 MG tablet    ATIVAN    30 tablet    Take 1 tablet (1 mg) by mouth every 6 hours as needed (Anxiety, Nausea/Vomiting or Sleep)    Ovarian cancer, unspecified laterality (H)       magnesium oxide 400 MG tablet    MAG-OX    90 tablet    Take 1 tablet (400 mg) by mouth 2 times daily    Ovarian cancer, unspecified laterality (H)       METFORMIN HCL PO      Take 500 mg by mouth daily        order for DME     3 each    Injection Supplies for Vitamin B12: 3cc syringes w/ 27 gauge needles, 1/2 inch length    B12 deficiency       potassium chloride 20 MEQ Packet    KLOR-CON     Take 20 mEq by mouth daily        prochlorperazine 10 MG tablet    COMPAZINE    30 tablet    Take 1 tablet (10 mg) by mouth every 6 hours as needed (nausea/vomiting)    Encounter for long-term (current) use of medications, Ovarian cancer, right (H), Ovarian cancer, left (H), Drug-induced neutropenia (H)       VITAMIN C PO      Take 500 mg by mouth daily    Pelvic mass       VITAMIN D PO      Take 1,000 Units by mouth daily Unknown dose        VITAMIN E NATURAL PO      Take 100 Units by mouth daily        * Notice:  This list has 2 medication(s) that are the same as other medications prescribed for you. Read the directions carefully, and ask your doctor or other care provider to review them with you.

## 2018-02-09 NOTE — NURSING NOTE
Chief Complaint   Patient presents with     Port Draw     labs drawn via port by RN     /79 (BP Location: Right arm, Patient Position: Chair, Cuff Size: Adult Regular)  Pulse 94  Temp 97.9  F (36.6  C) (Oral)  Wt 64 kg (141 lb 1.6 oz)  SpO2 100%  BMI 23.48 kg/m2    Vitals taken. Port accessed by RN. Labs collected and sent. Line flushed with NS & Heparin. Pt tolerated well. Pt checked in for next appointment.    Lina Queen RN

## 2018-02-09 NOTE — PATIENT INSTRUCTIONS
Patricia checkout note to schedulers:  Will need CT CAP with contrast before [facility] visit. Please switch her 2/23 labs/Avastin to [facility]. Can she get IV fluids at [facility] on 2/16?      Contact Numbers  [facility]: [phone]    After Hours:  [phone]  Triage: [phone]    Please call the [facility] Triage line if you experience a temperature greater than or equal to 100.5, shaking chills, have uncontrolled nausea, vomiting and/or diarrhea, dizziness, shortness of breath, chest pain, bleeding, unexplained bruising, or if you have any other new/concerning symptoms, questions or concerns.     If it is after hours, weekends, or holidays, please call the main hospital  at  [phone] and ask to speak to the Oncology doctor on call.     If you are having any concerning symptoms or wish to speak to a provider before your next infusion visit, please call your care coordinator or triage to notify them so we can adequately serve you.     If you need a refill on a narcotic prescription or other medication, please call triage before your infusion appointment.         February 2018 Sunday Monday Tuesday Wednesday Thursday Friday Saturday                       1     2     3       4     5     6     7     8     9     [facility]   10[facility]   (15 min.)    [facility] DRAW   [facility] Lab Draw     [facility] RETURN ACTIVE TREATMENT   10:45 AM   (40 min.)   Patricia Rocha APRN CNP   [facility] ONC INFUSION 240   12:00 PM   (240 min.)    ONCOLOGY INFUSION   [facility] 10       11     12     13     14     15     16     LEVEL 2   12:30 PM   (120 min.)    INFUSION CHAIR 5   [facility] 17       18     19     20     21     22     23     LEVEL 2   10:00 AM   (120 min.)    INFUSION CHAIR 8   [facility] 24       25     26     27     28                               March 2018    Sunday Monday Tuesday Wednesday Thursday Friday Saturday                       1     2     CT CHEST/ABDOMEN/PELVIS W   12:25 PM   (20 min.)   UCCT2   [facility] CT 3       4     5     [facility] RETURN    4:25 PM   (20 min.)   Garry Riggins MD   [facility] 6     7     8     9     10       11     12     13     14     15     16     17       18     19     20     21     22     23     24       25     26     27     28     29     30     31                 Recent Results (from the past 24 hour(s))   Magnesium    Collection Time: 02/09/18 11:00 AM   Result Value Ref Range    Magnesium 1.4 (L) 1.6 - 2.3 mg/dL   Comprehensive metabolic panel    Collection Time: 02/09/18 11:00 AM   Result Value Ref Range    Sodium 139 133 - 144 mmol/L    Potassium 3.4 3.4 - 5.3 mmol/L    Chloride 106 94 - 109 mmol/L    Carbon Dioxide 24 20 - 32 mmol/L    Anion Gap 9 3 - 14 mmol/L    Glucose 106 (H) 70 - 99 mg/dL    Urea Nitrogen 18 7 - 30 mg/dL    Creatinine 0.76 0.52 - 1.04 mg/dL    GFR Estimate 78 >60 mL/min/1.7m2    GFR Estimate If Black >90 >60 mL/min/1.7m2    Calcium 8.7 8.5 - 10.1 mg/dL    Bilirubin Total 0.3 0.2 - 1.3 mg/dL    Albumin 3.3 (L) 3.4 - 5.0 g/dL    Protein Total 7.2 6.8 - 8.8 g/dL    Alkaline Phosphatase 124 40 - 150 U/L    ALT 25 0 - 50 U/L    AST 19 0 - 45 U/L   CBC with platelets differential    Collection Time: 02/09/18 11:00 AM   Result Value Ref Range    WBC 4.1 4.0 - 11.0 10e9/L    RBC Count 2.78 (L) 3.8 - 5.2 10e12/L    Hemoglobin 11.1 (L) 11.7 - 15.7 g/dL    Hematocrit 32.4 (L) 35.0 - 47.0 %     (H) 78 - 100 fl    MCH 39.9 (H) 26.5 - 33.0 pg    MCHC 34.3 31.5 - 36.5 g/dL    RDW 14.9 10.0 - 15.0 %    Platelet Count 120 (L) 150 - 450 10e9/L    Diff Method Automated Method     % Neutrophils 45.9 %    % Lymphocytes 37.6 %    % Monocytes 13.8 %    % Eosinophils 1.0 %    % Basophils 0.5 %    % Immature Granulocytes 1.2 %    Nucleated RBCs 0 0 /100    Absolute Neutrophil 1.9 1.6 -  8.3 10e9/L    Absolute Lymphocytes 1.5 0.8 - 5.3 10e9/L    Absolute Monocytes 0.6 0.0 - 1.3 10e9/L    Absolute Eosinophils 0.0 0.0 - 0.7 10e9/L    Absolute Basophils 0.0 0.0 - 0.2 10e9/L    Abs Immature Granulocytes 0.1 0 - 0.4 10e9/L    Absolute Nucleated RBC 0.0    Protein qualitative urine    Collection Time: 02/09/18 11:05 AM   Result Value Ref Range    Protein Albumin Urine Negative NEG^Negative mg/dL

## 2018-02-09 NOTE — PROGRESS NOTES
"Gynecologic Oncology Follow-Up Note  RE: Odalys Nathan  MRN: [id number]  : 1960  Date of Visit: 2018    CC: Odalys Nathan is a 57 year old year old female with recurrent stage IIIC bilateral ovarian cancer who presents today for disease management with Doxil.    HPI: Odalys comes to the clinic accompanied by her  Marcos. She is feeling well today but states she feels \"lousy\" the week after chemotherapy with more fatigue and diarrhea. Has difficulty taking in adequate fluids the week after chemotherapy. Developed dysuria the week after chemotherapy as well which resolved spontaneously, relates this to poor fluid intake. Takes magnesium once daily when she feels she can tolerate it- develops diarrhea with this. She continues on ferrous sulfate 325mg TID for history of anemia. She also continues tinnitus- denies need for intervention. Has had many cycles of carboplatin, no history of reaction thus far. No mucosal issues with Doxil. No issues with Avastin, however, has not been able to receive D15 Avastin due to mid-cycle thrombocytopenia. She is eating well, currently drinking well, reports she is ready for chemotherapy today.    Brief Oncology History:  2012 - Admitted to hospital for 2 weeks of intermittent abdominal cramping, distention, diarrhea and N/V. CT of abdomen/pelvis significant for small bowel obstruction, a heterogenous soft tissue density in the pelvis, omental nodules, and ascites. Bilateral adnexal masses per U/S of pelvis. CA-125 was elevated at 987, CEA was normal at 1.0.    12 - Therapeutic paracentesis (4 L) with cytology confirming malignancy (IA positive, weak ER positive, CK-7 positive consistent with GYN primary). Surgery recommended d/t potential for falling blood counts 2/2 chemotherapy (patient is Spiritism and limiting blood transfusion)    12 - Exploratory laparotomy, MAR BSO, lysis of adhesion, Appendectomy, Repair cystotomy, " Omentectomy Nrms-lsvhfv-wkpgchqfy-transverse-colon resection, Ileo-descending colon anastomosis, CUSA, & Colonoscopy (done by Dr. Amato and colorectal team).  2/20/2012 - Admitted to hospital for bilateral pulmonary emboli and drainage of pleural effusion. Started on Lovenox.  2/28/12-3/21/12: Cycle #1-2 Carbo/Taxol IV  3/29/12 - Started on Keflex by her PCP for infection in her healing wound (immediately below her umbilicus).    4/11/12-6/14/12: Cycle #3-6 IV chemo, Cycle #1 IV/IP chemo  7/16/12 -  8, CT PRADEEP - enrolled in  - observation arm  3/4/13-6/28/13:  6, 9, 12, 15, 20.  7/1/13: CT Chest, Abdomen, Pelvis IMPRESSION:  1. Worsening metastatic ovarian carcinoma suggested by increased size of soft tissue nodules anterior to the right psoas muscle, that may represent growing mesenteric lymphadenopathy.  2. Remaining prominent right lower quadrant mesenteric lymph nodes are not significantly changed from CT 7/16/2012.  3. Clustered nodular opacities in the right lower lobe are not significant change from 7/16/2012 and remain indeterminate. Again, the appearance and distribution is suggestive of an infectious etiology.  4. Stable 8 mm soft tissue nodule in left breast, unchanged since at least 02/20/2012. This can be observed on followup studies, but correlation with mammography could be considered.  Decision made to start Doxil/Carbo.  7/11/13: The left ventricular ejection fraction is normal at 66.4%.  7/12/13-9/6/13: Cycle #1-3 Doxil/Carbo.  10, 11.    9/30/13 CT C/A/P Impression:  1. Overall, favorable response to treatment with decreasing size of soft tissue nodules tracking along the anterior aspect of the right psoas muscle.  2. 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  3. Improved cluster of right lower lobe pulmonary nodular opacities. These may represent resolving infection.  10/3/13-12/9/13:  9, 8, 10. Cycle 4-6 Doxil/Carbo.    1/13/14 CT C/A/P Impression:   1. Stable  appearance of metastatic ovarian cancer. Scattered soft tissue nodules along the anterior aspect of the right psoas muscle are unchanged in size. Mild mesenteric lymphadenopathy is unchanged.  2. Clustered micronodules in the right lower lobe are unchanged from 9/30/2013, but improved from 7/1/2013. This history suggests a postinflammatory/postinfectious etiology.  3. 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  1/16/14 Discussed multiple options for her based on relatively stable-appearing disease on CT but slight increase in  (which has had small increase to 20 with last recurrence) including chemo break with recheck of  in 1 month, starting new chemo agent immediately, and exploratory surgery with possible resection of nodules. She is considered platinum-sensitive based on > 1 year remission after Taxol/Carbo, which we will take into consideration for future chemo planning. I am not inclined to surgery at this time given difficulty she already has with diarrhea secondary to past colon resection. I suspect we would need to resect further bowel due to mesenteric disease. Also explained inherent risks of any major surgery. Also mentioned maintenance chemo, but this has not been shown to increase overall survival and would likely decrease her quality of life without significant benefit. Family is going on vacation to [place] in 2 weeks and Odalys does not want to have chemo prior to that, so will plan to take 1 month break. She can have  at that time (discussed checking toady since last draw was in early December, but as it would likely not change treatment plan and she has h/o slow rising , will not check today).  2/17/14  16    2/20/14: Decision to take break from chemo for two months, followed by CT and CA-125.    4/21/14  27  4/21/14 CT C/A/P Impression:    1. Increased size of 2 low-attenuation lymph nodes anterior to the right psoas muscle is concerning for worsening  "metastatic ovarian cancer.    2. New circumferential thickening of a 3.8 cm length segment of distal transverse colon is likely physiologic. Recommend attention on followup imaging.    3. Grossly unchanged size of clustered small nodules versus scarring in the right lower lobe the lungs.    4. Stable thrombosis of the right ovarian vein.  5/14/14: Diagnostic laparoscopy converted to exploratory laparotomy and removal of mesenteric masses, tumor debulking, peritoneal biopsies and intraperitoneal port placement. On laparoscopy, it was noted that there were small nodules on the anterior abdominal wall near the previous incision, small nodules on the right pelvic sidewall as well. Nodules were palpated in the mesentery; however, as it was unable to clarify where the origin of the nodules was, the decision was made to open the patient. On opening there was found to be approximately a 3 cm nodule in the small bowel mesentery and another separate approximately 2 cm nodule in the bowel mesentery. Pelvis without evidence of cancer, some mesenteric lymph nodes were palpated. No evidence otherwise of any disseminated cancer throughout the abdomen.    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 (2/2)  -Largest metastasis: 1.3 cm  -See comment  D: Peritoneum, right paracolic gutter #2, biopsy:  -Fibroadipose tissue with granulomatous inflammation surrounding refractile material  -Negative for malignancy   E: Small bowel adhesion, biopsy:  -Fibroconnective tissue, consistent with adhesion  -Negative for malignancy  F: Lymph nodes, mesentry, not otherwise specified, excision:  -Two " "lymph nodes, negative for metastatic carcinoma (0/2)  G: Lymph node, mesentery, \"#2\", excision:  -One lymph node, negative for metastatic carcinoma (0/1)  H: Lymph nodes, mesentery, \"nodule #2\", excision:  -Five lymph nodes, negative for metastatic carcinoma (0/5)  COMMENT:  Some of the specimens show post-operative changes. Others show possible treatment related changes, including necrosis. The metastatic carcinoma in the mesenteric lymph nodes (specimen C) shows variable morphology, including relatively low grade tumor with papillary architecture, and high grade tumor comprised of nests of tumor cells with irregular, slit-like spaces and marked nuclear pleomorphism.    5/29/14: Cycle 1 IV PACLitaxel / IP CISplatin / IP PACLitaxel.  - 28.  6/26/14: Cycle #2 IV/IP.  10  8/5/14: CT chest/abd/pelvis IMPRESSION     1. In this patient with ovarian cancer, overall findings are indicative of stable/slight improvement, as multiple mesenteric lymphadenopathy and scattered nodular peritoneal soft tissue mass lesions appear unchanged or slightly smaller since 4/21/2014.    2. 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 Mild dilatation of the second and the third portion of the duodenum with a narrow SMA angle. This could represent SMA syndrome, if clinically correlated.17/14:    Cycle #3 IV/IP.  10.    CT chest/abd/pelvis with contrast on 8/5/14    Impression:    1. In this patient with ovarian cancer, overall findings are indicative of stable/slight improvement, as multiple mesenteric lymphadenopathy and scattered nodular peritoneal soft tissue mass lesions appear unchanged or slightly smaller since 4/21/2014.    2. 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 Mild dilatation of the second and the third portion of the duodenum with a narrow SMA angle. This could represent SMA syndrome, if clinically correlated.  8/7/14: Cycle #4 Taxol/Carbo (changed from IV/IP).  10. " She has been feeling okay. She is unsure if she can finish out the course of 6 cycles IV/IP taxol/cisplatin. She feels like she has the flu for about a week then starts feeling gradually better after each chemo cycle. Her spouse notes that she actually has been more sick with the treatments than she initially admits here. She was also previously having some rib pain. Denies any rib pain now. Denies any chest pain or shortness of breath.  Plan: discussed recent CT cap results and switching to just IV as she is feeling miserable with IP treatments. Switch to IV carbo/taxol as patient is platinum sensitive.  We also discussed her taking part of the tesaro trial, which would require BRCA testing. She would like to take part in this trial if eligible.  8/20/14: Remove Intraperitoneal Port ( Port and catheter intact - discarded)  8/28/14: Cycle #5 Taxol/Carbo held due to thrombocytopenia.  6.    She denies any vaginal bleeding, no changes in her bowel or bladder habits, no nausea/emesis, no lower extremity edema, and no difficulties eating or sleeping. She denies any abdominal discomfort/bloating, no fevers or chills, and no chest pain or shortness of breath. She states her diarrhea is the same. She reports some fatigue which improves about 1-2 weeks after her chemotherapy. She states she does not need any medication refills and she was told she does not meet the criteria for the TESARO trial. She states she has 3 bags of iv fluids left over from her previous chemotherapy and will give these to herself. She states she is ready for her treatment today.    9/29/14: Cycle #6 Taxol/Carbo  6. Insurance questions regarding GSF coverage today. No concerns other than fatigue. Taking iron for anemia and does not desire blood transfusion. Using neulasta for neutropenia. Using home IV hydration if needed. Baseline unchanged. No abdominal bloating, constipation, diarrhea, pain, vaginal or rectal bleeding, cough or  dyspnea, fluid retention.    10/16/14: Impression:    1. Nodular peritoneal soft tissue mass in the right lower quadrant adjacent to the psoas muscle is no longer appreciated. Adjacent prominent lymphadenopathy is unchanged from previous exam. No new peritoneal lesions.    2. 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    10/20/14:  5. CT chest/abdomen/pelvis on 10/16/14 showed nodular peritoneal soft tissue mass in the right lower quadrant adjacent to the psoas muscle is no longer appreciated. Adjacent prominent lymphadenopathy is unchanged from previous exam. 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  1/27/15:  6.  4/28/15:  14.  5/26/15:  18.  6/2/15: CT cap Impression:  1. Postsurgical changes of hysterectomy and bilateral salpingo-oophorectomy for ovarian cancer. There is a new 8 mm hazy, ill-defined hypoattenuating lesion in hepatic segment 6 which is suspicious for a metastatic deposit. Further evaluation with ultrasound in recommended.    2. Increased size of a left retroperitoneal lymph node which is indeterminate but may represent a ciro metastasis. Mildly prominent lymph nodes in the right lower quadrant are not significantly changed.  3. Moderate colonic stool burden.    6/4/15: US abdomen IMPRESSION:    Hyperechoic lesion in the right hepatic lobe, consistent with hemangioma. This does not corresponds to the area of the lesion seen on CT from 6/2/2015. An MR would be helpful for identifying and characterizing the lesion from the recent CT.  6/12/15: MR abd IMPRESSION:  1. New 20 x 11 mm enhancing lesions between the right obliques, concerning for metastatic disease. This lesions should be amenable to percutaneous biopsy, if indicated.  2. Correlating to the lesion visualized on comparison CT is a hepatic segment 6 subcapsular 7 mm lesion. Overall the appearance favors the diagnosis of a simple cyst. However, there is faint  suggestion of mild peripheral arterial enhancement. Although this is favored as  artifactual, this should be followed up to confirm stability. Recommend 6 month followup.  3. Hepatic segment 6, 5 mm lesion too small to technically characterize. Differential would favor FNH, less likely flash filling hemangioma. Recommend attention on followup.  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     9/1/15: Cycle #1 Avastin/Cytoxan.   31.     9/24/15:feeling generally well. She says she has been having back and stomach spasms. She is taking cytosine daily (she ran out yesterday). She also says its affecting her voice. Admits that it burns occasionally. She is eating and drinking normal. She also admit diarrhea, 5-7 times daily, lose/watery. She trying to stay hydrated and eat fiber. She also says that her body is sore, especially the bottom of her feet. Her blood pressure is normal. She also admits having headache after her first infusion.      9/24/15: Cycle #2 Avastin/Cytoxan.  19.  10/15/15: Cycle #3 Avastin/Cytoxan.  16.     11/6/15: CT c/a/p IMPRESSION:    1. Stable postoperative change of MAR/BSO for ovarian cancer.  2. The lesion in the right flank abdominal musculature is slightly decreased in size. Otherwise, stable examination.  2. No evidence of metastatic disease in the chest.    11/20/15: Treatment planning visit,  16    11/25/15: surgical pathology report  FINAL DIAGNOSIS:  Soft tissue, right oblique muscle mass, excision:  -Recurrent ovarian serous carcinoma  -Carcinoma is present less than 1 mm from one resection margin  -Background skeletal muscle and fibroadipose tissue    1/4/16:  22  1/11/16-1/27/16: Radiation to right flank x 12 treatments  4/7/2016:  94. CT cap IMPRESSION:    In this patient with ovarian cancer status post MAR/BSO and  descending/transverse colectomy:  1. No evidence for malignancy in the chest, abdomen, or pelvis.  2. Stable small hypodense segment 6 liver lesion, appears more likely benign, possibly a cyst.  5/13/16:  124.  6/3/16: PET CT IMPRESSION: In this patient with a history of ovarian cancer:  1. Hypermetabolic and enlarging periaortic and perihepatic lymphadenopathy compatible with metastatic disease, as detailed above.  2. Although hypodense lesion in hepatic segment 6 has been present since 6/2/2015 associated hypermetabolism makes this lesion highly concerning for metastatic disease.    Plan: to start Niraparib under TESARO study.     6/9/16:  137.  6/14/16: Cycle #1 Niraparib.    6/28/16: Cycle #1 D15 Niraparib.    7/5/16:  100.    7/11/16: Cycle #2 Nraparib.   83.    8/3/2016: PET CT IMPRESSION:    In this patient with known history of ovarian cancer:  1) New pleural based nodular opacities in the lateral and inferior aspects of the bilateral lower lobes, worse in the left lung. Likely infection. Close follow up is recommended.      2) Slight decrease in hypermetabolic abdominal lymphadenopathy. 2 hypermetabolic lymph nodes persist.  3) Unchanged right hepatic lobe metastatic lesion.     8/9/16: Cycle #3 Niraparib.  69.  9/6/16: Cycle #4 Niraparib.  53. Dose held due to anemia.  9/13/16: Eval for potential cycle 4 niraparib. Dose held due to anemia.  10/4/16: CT CAP impression:  IMPRESSION: In this patient with a known history of ovarian cancer:  1. There has been interval resolution of pleural-based nodular  opacities which likely represented infection.  2. Abdominal lymphadenopathy in the form of 2 portacaval lymph nodes  have not significantly changed in size, noted to be hypermetabolic on  prior PET/CT.  3. Previously demonstrated metastatic lesion in the right lobe of the  liver is not significantly changed.  10/11/16:  60  11/1/16: C6 niraparib,  75  11/29/16: C7  niraparib.  78. CT CAP impression as follows:  Target lesions (RECIST criteria):       A previously described target lesion superior to the head of the  pancreas (series 2, image 64)  (referred to as a perihepatic node on  6/3/2016) may not be a valid target lesion because it measured less  than 1.5 cm originally. However, this particular node has decreased in  size, now measuring 7 mm in short axis versus 14 mm on 6/3/2016 when  measured in a similar fashion.       2.3 cm short axis portacaval lymph node on series 2 image 67,  previously 2.0 cm on 10/4/2016.       1.2 cm subtle hypodensity in hepatic segment 6 on series 2 image 75,  stable on multiple studies since at least 6/3/2016     Sum of diameters today: 3.5 cm. Sum of diameters 10/4/2016: 3.2 cm.  Growth = 9%.    12/27/16: C8 niraparib.  105.  1/25/17: C9 niraparib.  108.  2/23/17: C10 niraparib.  132.   CT CAP impression:  Sum of target lesion diameters today: 3.7 cm. Sum of target lesion  diameters on 11/28/2016: 3.5 cm. Growth= 6%  1. In this patient with history of ovarian cancer there is stable  disease by RECIST criteria as evidenced by:   1a. Mildly increased size of liver metastasis.  1b. Stable portacaval lymphadenopathy.  1c. No evidence of metastatic disease in the chest.  2. Trace emphysematous changes of the lungs.  3/22/17: C11 niraparib.  132.  4/19/17: C12 niraparib.  127.  5/16/17: CT CAP IMPRESSION: In this patient with ovarian cancer:  1. Mildly increased size of hepatic metastasis segment 6 with subtle increased capsular retraction.  2. Stable edmundo hepatis nodes, with mild increase in size of periaortic lymph nodes.  3. Prominent left supraclavicular lymph node with subtle increase in size compared to prior studies, particularly comparing to 10/4/2016.  4. Mild subtle groundglass opacities in the right upper lobe, not present on prior study, lesser extent in the right lower lobe.  Findings may  represent infection, additional consideration is malignancy (less likely), and attention on follow-up study  Recommended.  Addendum:   Prominent left supraclavicular lymph node (3/25) is stable from most recent CT performed 2/20/2017, currently measuring 14 x 16 mm, previously 14 x 16 mm on 2/20/2017.      The portal caval lymph node/edmundo hepatis lymph node is stable from 2/20/2017, measuring 21 mm.      Clarification of size of the para-aortic lymph node (series 3 image 327). It short axis measurement is 11 mm versus 9 mm on prior study.      Hepatic segment 6 triangular-shaped low density lesion (3/362) measures 14 mm, previously measured 12 mm, demonstrating a  possible/questionable minimal subtle increase in size. Similarly the lymph nodes noted above in the supraclavicular and para-aortic regions demonstrate possible minimal possible subtle increase in size.      5/18/17: Cycle #13 Niraparib.  191.  6/15/17: Cycle #14 Niraparib.  146.  7/13/17: Cycle #15 Niraparib 200 mg.  171     CT (8/9/17):       IMPRESSION:  1. Segment 6 hepatic metastasis is stable to minimally increased in  size.  2. Slight increase in multicentric adenopathy, most pronounced at the  edmundo hepatis. Additional sites include the inferior left  neck/supraclavicular region and retroperitoneum which appear stable to  minimally increased.      8/10/17:  175  9/14/17: MUGA LVEF 54%  9/22/17: C1D1 carboplatin/Doxil.  187.  10/19/17: C2D1 carboplatin/Doxil.  108.  11/17/17: C3D1 carboplatin/Doxil.  82.  12/14/17: C4D1 carboplatin/Doxil.  83.  1/12/18: C5D1 carboplatin/Doxil.  80.  2/9/18: C6D1 carboplatin/Doxil.  pending.    Past Medical History:   Diagnosis Date     Antiplatelet or antithrombotic long-term use      Ascites      Blood clot in the legs      Diabetes (H)      Ovarian cancer (H)     serous,stg IV     Pleural effusion      Pulmonary embolism (H) 2/2012     Refusal of blood  transfusions as patient is Buddhist      Short gut syndrome      Subclinical hypothyroidism 4/18/2013     Thrombosis of leg        Past Surgical History:   Procedure Laterality Date     COLECTOMY       COLONOSCOPY  2/1/2012    Procedure:COLONOSCOPY; With Biopsy; Surgeon:TONI SULLIVAN; Location:[facility]     HYSTERECTOMY TOTAL ABD, RHIANNA SALPINGO-OOPHORECTOMY, NODE DISSECTION, TUMOR DEBULKING, COMBINED  2/1/2012    Procedure:COMBINED HYSTERECTOMY TOTAL ABDOMINAL, BILATERAL SALPINGO-OOPHORECTOMY, NODE DISSECTION, TUMOR DEBULKING;  Exploratory Laparotomy, Total Abdominal Hysterectomy, Bilateral Salpingo-Oophorectomy, appendectomy,lysis of adhesions, ileal, ascending, transverse and splenic flexure resection, ileal descending bowel renanastomosis, incidental cystotomy repair, CUSA procedure and colonoscopy ; [facility]     INSERT PORT PERITONEAL ACCESS  4/3/2012    Procedure:INSERT PORT PERITONEAL ACCESS; Intraperitoneal Port Placement (c-arm); Surgeon:SAMUEL CARRASCO; Location:[facility]     INSERT PORT PERITONEAL ACCESS  5/14/2014    Procedure: INSERT PORT PERITONEAL ACCESS;  Surgeon: Nga Yeung MD;  Location: [facility] OR     INSERT PORT VASCULAR ACCESS       LAPAROSCOPY DIAGNOSTIC (GYN)  5/14/2014    Procedure: LAPAROSCOPY DIAGNOSTIC (GYN);  Surgeon: Nga Yeung MD;  Location: [facility] OR     LAPAROTOMY EXPLORATORY Right 11/25/2015    Procedure: LAPAROTOMY EXPLORATORY;  Surgeon: Nga Yeung MD;  Location: [facility]     LAPAROTOMY, TUMOR DEBULKING, COMBINED  5/14/2014    Procedure: COMBINED LAPAROTOMY, TUMOR DEBULKING;  Surgeon: Nga Yeung MD;  Location: [facility]     REMOVE CATHETER PERITONEAL N/A 8/20/2014    Procedure: REMOVE CATHETER PERITONEAL;  Surgeon: Nga Yeung MD;  Location: [facility] OR     VASCULAR SURGERY      stent left iliac vein       Current Outpatient Prescriptions   Medication     Ferrous Sulfate 324 (65 FE) MG TBEC     iohexol (OMNIPAQUE) 140 MG/ML SOLN solution      sulfamethoxazole-trimethoprim (BACTRIM DS/SEPTRA DS) 800-160 MG per tablet     LORazepam (ATIVAN) 1 MG tablet     prochlorperazine (COMPAZINE) 10 MG tablet     LEVOTHYROXINE SODIUM PO     order for DME     METFORMIN HCL PO     diphenoxylate-atropine (LOMOTIL) 2.5-0.025 MG per tablet     cyanocobalamin (VITAMIN B12) 1000 MCG/ML injection     magnesium oxide (MAG-OX) 400 MG tablet     ASPIRIN PO     potassium chloride (KLOR-CON) 20 MEQ packet     VITAMIN E NATURAL PO     Cholecalciferol (VITAMIN D PO)     HERBALS     Ascorbic Acid (VITAMIN C PO)     Calcium Carbonate-Vitamin D (CALCIUM + D PO)     No current facility-administered medications for this visit.      Facility-Administered Medications Ordered in Other Visits   Medication     heparin 100 UNIT/ML injection 500 Units          No Known Allergies    Family History   Problem Relation Age of Onset     CANCER Mother 69     lung, smoker     CANCER Maternal Uncle 65     brain     Colon Cancer Maternal Aunt 80     colon       Social History     Social History     Marital status:      Spouse name: N/A     Number of children: N/A     Years of education: N/A     Occupational History     Not on file.     Social History Main Topics     Smoking status: Former Smoker     Years: 8.00     Types: Cigarettes     Quit date: 6/21/1980     Smokeless tobacco: Never Used      Comment: started at 11 yo and quit at 18 yo     Alcohol use Yes      Comment: 3x/day wine or jodi     Drug use: No     Sexual activity: Not on file     Other Topics Concern     Not on file     Social History Narrative       Review of Systems     Constitutional:  Positive for fatigue. Negative for fever, chills, weight loss, weight gain, decreased appetite, night sweats, recent stressors, height gain, height loss, post-operative complications, incisional pain, hallucinations, increased energy, hyperactivity and confused.   HENT:  Positive for tinnitus. Negative for ear pain, hearing loss, nosebleeds,  trouble swallowing, hoarse voice, mouth sores, sore throat, ear discharge, tooth pain, gum tenderness, taste disturbance, smell disturbance, hearing aid, bleeding gums, dry mouth, sinus pain, sinus congestion and neck mass.    Eyes:  Negative for double vision, pain, redness, eye pain, decreased vision, eye watering, eye bulging, eye dryness, flashing lights, spots, floaters, strabismus, tunnel vision, jaundice and eye irritation.   Respiratory:   Negative for cough, hemoptysis, sputum production, shortness of breath, wheezing, sleep disturbances due to breathing, snores loudly, respiratory pain, dyspnea on exertion, cough disturbing sleep and postural dyspnea.    Cardiovascular:  Negative for chest pain, dyspnea on exertion, palpitations, orthopnea, claudication, leg swelling, fingers/toes turn blue, hypertension, hypotension, syncope, history of heart murmur, chest pain on exertion, chest pain at rest, pacemaker, few scattered varicosities, leg pain, sleep disturbances due to breathing, tachycardia, light-headedness, exercise intolerance and edema.   Gastrointestinal:  Positive for diarrhea. Negative for heartburn, nausea, vomiting, abdominal pain, constipation, blood in stool, melena, rectal pain, bloating, hemorrhoids, bowel incontinence, jaundice, rectal bleeding, coffee ground emesis and change in stool.   Genitourinary:  Negative for bladder incontinence, dysuria, urgency, hematuria, flank pain, vaginal discharge, difficulty urinating, genital sores, dyspareunia, decreased libido, nocturia, voiding less frequently, arousal difficulty, abnormal vaginal bleeding, excessive menstruation, menstrual changes, hot flashes, vaginal dryness and postmenopausal bleeding.   Musculoskeletal:  Negative for myalgias, back pain, joint swelling, arthralgias, stiffness, muscle cramps, neck pain, bone pain, muscle weakness and fracture.   Skin:  Negative for nail changes, itching, poor wound healing, rash, hair changes, skin  changes, acne, warts, poor wound healing, scarring, flaky skin, Raynaud's phenomenon, sensitivity to sunlight and skin thickening.   Neurological:  Negative for dizziness, tingling, tremors, speech change, seizures, loss of consciousness, weakness, light-headedness, numbness, headaches, disturbances in coordination, extremity numbness, memory loss, difficulty walking and paralysis.   Endo/Heme:  Negative for anemia, swollen glands and bruises/bleeds easily.   Psychiatric/Behavioral:  Negative for depression, hallucinations, memory loss, decreased concentration, mood swings and panic attacks.    Breast:  Negative for breast discharge, breast mass, breast pain and nipple retraction.   Endocrine:  Negative for altered temperature regulation, polyphagia, polydipsia, unwanted hair growth and change in facial hair.        Physical Exam:    /79 (BP Location: Right arm, Patient Position: Chair, Cuff Size: Adult Regular)  Pulse 94  Temp 97.9  F (36.6  C) (Oral)  Wt 64 kg (141 lb 1.6 oz)  SpO2 100%  BMI 23.48 kg/m2    General: Alert non-toxic appearing female in no acute distress  HEENT: Normocephalic, atraumatic; PERRLA; no scleral icterus; oropharynx pink without lesions; neck supple  Pulmonary: Lungs clear to auscultation, no increased work of breathing noted  Cardiac: Regular rate and rhythm, S1S2, no clicks, murmurs, rubs, or gallops; bilateral lower extremities without edema  GI: Normoactive bowel sounds x4 quadrants, abdomen soft, non-distended, and non-tender to palpation without masses or organomegaly  : Not indicated  Heme: Cervical, supraclavicular, and inguinal nodes without lymphadenopathy  MSK: Moves all extremities, no obvious muscle wasting  Neuro: No gross deficits, normal gait  Skin: Appropriate color for race, warm and dry, no rashes or lesions to unclothed skin; no palmar plantar erythrodysesthesia  Psych: Pleasant and interactive, affect bright, makes appropriate eye contact, thought process  linear    Labs:      2/9/2018  Day 1   Hemoglobin 11.7 - 15.7 g/dL 11.1 (A)   Hematocrit 35.0 - 47.0 % 32.4 (A)   Platelet Count 150 - 450 10e9/L 120 (A)   Absolute Neutrophil 1.6 - 8.3 10e9/L 1.9   Sodium 133 - 144 mmol/L 139   Potassium 3.4 - 5.3 mmol/L 3.4   Chloride 94 - 109 mmol/L 106   Carbon Dioxide 20 - 32 mmol/L 24   Urea Nitrogen 7 - 30 mg/dL 18   Creatinine 0.52 - 1.04 mg/dL 0.76   Calcium 8.5 - 10.1 mg/dL 8.7   Magnesium 1.6 - 2.3 mg/dL 1.4 (A)   Bilirubin Total 0.2 - 1.3 mg/dL 0.3   ALT 0 - 50 U/L 25   AST 0 - 45 U/L 19   Alkaline Phosphatase 40 - 150 U/L 124   Albumin 3.4 - 5.0 g/dL 3.3 (A)   Protein Total 6.8 - 8.8 g/dL 7.2   WBC 4.0 - 11.0 10e9/L 4.1    pending    Assessment/Plan:  1) Recurrent stage IIIC bilateral ovarian cancer: Currently feeling well other than fatigue. Tolerating chemotherapy without dose limiting side effects, no apparent palmar plantar erythrodysesthesia. Proceed with C6D1 carboplatin/Doxil/Avastin as ordered by Dr. Yeung. To have CT imaging and treatment planning after cycle six with Dr. Riggins during Dr. Yeung's absence. Continue with energy conservation and periods of rest/activity for fatigue management. IV magnesium replacement today- continue mag ox 400mg PO once daily and to increase dietary sources of magnesium. Urinary sxs resolved, but UA with reflex to UC placed as standing order should this recur during treatment. To receive 1L NS IV next week for support. Reviewed risks of carboplatin reaction and s/sxs reaction and when to call her nurse/seek further care. Reviewed risks with Avastin including bleeding and GI perforation. Reviewed signs and symptoms for when she should contact the clinic or seek additional care, including but not limited to fever, chills, inability to keep down food or fluids, nausea and vomiting not controlled with antiemetics, and diarrhea leading to dehydration. Patient to contact the clinic with any questions or concerns in the  interim.   2) Genetic counseling: Testing has been performed and she is negative for mutations in BRCA1, BRCA2, EPCAM, MLH1, MSH2, MSH6, PMS2, PTEN, and TP53 genes  3) Health maintenance issues discussed include to continue following up with PCP for non-gynecologic concerns.  4) Patient verbalized understanding of and agreement with plan    A total of 20 minutes spent with patient, over 50% spent in counseling and coordination of care.    HAILE De Paz, FNP-C  Family Nurse Practitioner  Gynecologic Oncology  [facility]  Pager: [phone]

## 2018-02-09 NOTE — MR AVS SNAPSHOT
After Visit Summary   2/9/2018    Odalys Nathan    MRN: [id number]           Patient Information     Date Of Birth          1960        Visit Information        Provider Department      2/9/2018 11:00 AM Patricia Rocha APRN [facility]        Today's Diagnoses     Ovarian cancer, right (H)    -  1    Ovarian cancer, left (H)        Encounter for long-term (current) use of medications        Drug-induced neutropenia (H)        Hypomagnesemia           Follow-ups after your visit        Your next 10 appointments already scheduled     Feb 16, 2018 12:30 PM CST   Level 2 with RH INFUSION CHAIR 3   [facility] ([facility])    [facility]  [address]  [facility] [address]   [phone]            Feb 23, 2018 10:00 AM CST   Level 2 with RH INFUSION CHAIR 8   [facility] ([facility])    [facility]  [address]  [facility] [address]   [phone]            Mar 02, 2018 12:40 PM CST   (Arrive by 12:25 PM)   CT CHEST/ABDOMEN/PELVIS W CONTRAST with UCCT2   [facility] ([facility])    [address] [address] [phone]           Please bring any scans or X-rays taken at other hospitals, if similar tests were done. Also bring a list of your medicines, including vitamins, minerals and over-the-counter drugs. It is safest to leave personal items at home.  Be sure to tell your doctor:   If you have any allergies.   If there s any chance you are pregnant.   If you are breastfeeding.  You may have contrast for this exam. To prepare:   Do not eat or drink for 2 hours before your exam. If you need to take medicine, you may take it with small sips of water. (We may ask you to take liquid medicine as well.)   The day before your exam,  drink extra fluids at least six 8-ounce glasses (unless your doctor tells you to restrict your fluids).   You will be given instructions on how to drink the contrast.  Patients over 70 or patients with diabetes or kidney problems:   If you haven t had a blood test (creatinine test) within the last 30 days, the Cardiologist/Radiologist may require you to get this test prior to your exam.  If you have diabetes:   Continue to take your metformin medication on the day of your exam  Please wear loose clothing, such as a sweat suit or jogging clothes. Avoid snaps, zippers and other metal. We may ask you to undress and put on a hospital gown.  If you have any questions, please call the Imaging Department where you will have your exam.            Mar 05, 2018  4:40 PM CST   (Arrive by 4:25 PM)   Return Visit with Garry Riggins MD   [facility] ([facility])    [address] [place] [address]  [address] [place] [address] [phone]              Future tests that were ordered for you today     Open Future Orders        Priority Expected Expires Ordered    CT Chest/Abdomen/Pelvis w Contrast Routine  2/9/2019 2/9/2018     Routine 3/1/2018 2/9/2019 2/9/2018    CT Chest/Abdomen/Pelvis w Contrast Routine  2/9/2019 2/9/2018            Who to contact     If you have questions or need follow up information about today's clinic visit or your schedule please contact [facility] directly at [phone].  Normal or non-critical lab and imaging results will be communicated to you by MyChart, letter or phone within 4 business days after the clinic has received the results. If you do not hear from us within 7 days, please contact the clinic through [employer] or phone. If you have a critical or abnormal lab result, we will notify you by phone as soon as possible.  Submit refill requests through [employer] or call your pharmacy and they will forward the refill request to  us. Please allow 3 business days for your refill to be completed.          Additional Information About Your Visit        [employer] Information     [employer] gives you secure access to your electronic health record. If you see a primary care provider, you can also send messages to your care team and make appointments. If you have questions, please call your primary care clinic.  If you do not have a primary care provider, please call [phone] and they will assist you.        Care EveryWhere ID     This is your Care EveryWhere ID. This could be used by other organizations to access your [place] medical records  [id number]        Your Vitals Were     Pulse Temperature Pulse Oximetry BMI (Body Mass Index)          94 97.9  F (36.6  C) (Oral) 100% 23.48 kg/m2         Blood Pressure from Last 3 Encounters:   02/09/18 123/79   01/26/18 (!) 119/94   01/12/18 121/57    Weight from Last 3 Encounters:   02/09/18 64 kg (141 lb 1.6 oz)   01/26/18 63.2 kg (139 lb 7 oz)   01/12/18 64.4 kg (142 lb)                 Today's Medication Changes          These changes are accurate as of 2/9/18  4:32 PM.  If you have any questions, ask your nurse or doctor.               These medicines have changed or have updated prescriptions.        Dose/Directions    * iohexol 140 MG/ML Soln solution   Commonly known as:  OMNIPAQUE   This may have changed:  Another medication with the same name was added. Make sure you understand how and when to take each.   Used for:  Ovarian cancer, right (H), Metastatic cancer (H)   Changed by:  Tammy Flores, RN        Mix entire bottle (50ml) of contast with 600ml (20 ounces) of water and drink half 2 hrs prior to CT scan and half 1 hr prior to scan   Quantity:  140 mL   Refills:  0       * iohexol 140 MG/ML Soln solution   Commonly known as:  OMNIPAQUE   This may have changed:  You were already taking a medication with the same name, and this prescription was added. Make sure you understand how and when to  take each.   Used for:  Ovarian cancer, right (H)   Changed by:  Tammy Flores RN        Dose:  50 mL   Take 50 mLs by mouth once for 1 dose   Quantity:  50 mL   Refills:  3       magnesium oxide 400 MG tablet   Commonly known as:  MAG-OX   This may have changed:  when to take this   Used for:  Ovarian cancer, unspecified laterality (H)        Dose:  400 mg   Take 1 tablet (400 mg) by mouth 2 times daily   Quantity:  90 tablet   Refills:  3       * Notice:  This list has 2 medication(s) that are the same as other medications prescribed for you. Read the directions carefully, and ask your doctor or other care provider to review them with you.         Where to get your medicines      These medications were sent to [facility] [address] [place] [address] [phone]  [address] [place] [address] [address] [place] [address]    Hours:  TRANSPLANT PHONE NUMBER [phone] Phone:  [phone]     iohexol 140 MG/ML Soln solution                Primary Care Provider Office Phone # Fax #    Aubrie Brooksjethro [phone]       [facility] [address] [facility] [address]        Equal Access to Services     SANDY CHAMBERS AH: Hadii aad ku hadasho Soomaali, waaxda luqadaha, qaybta kaalmada adeegyada, blanka dempsey haymarquise szymanski. So [facility] [phone].    ATENCIÓN: Si habla español, tiene a bell disposición servicios gratuitos de asistencia lingüística. Brijeshame al [phone].    We comply with applicable federal civil rights laws and [place] laws. We do not discriminate on the basis of race, color, national origin, age, disability, sex, sexual orientation, or gender identity.            Thank you!     Thank you for choosing [facility]  for your care. Our goal is always to provide you with excellent care. Hearing back from our patients is one way we can continue to improve our services. Please take a few minutes to complete the written survey  that you may receive in the mail after your visit with us. Thank you!             Your Updated Medication List - Protect others around you: Learn how to safely use, store and throw away your medicines at www.disposemymeds.org.          This list is accurate as of 2/9/18  4:32 PM.  Always use your most recent med list.                   Brand Name Dispense Instructions for use Diagnosis    ASPIRIN PO      Take 325 mg by mouth daily        CALCIUM + D PO      Take 1 tablet by mouth daily.    Pelvic mass       cyanocobalamin 1000 MCG/ML injection    VITAMIN B12    1 mL    Inject 1 mL (1,000 mcg) into the muscle every 30 days    B12 deficiency       diphenoxylate-atropine 2.5-0.025 MG per tablet    LOMOTIL    60 tablet    Take 2 tablets by mouth 4 times daily as needed for diarrhea    Acute diarrhea       Ferrous Sulfate 324 (65 FE) MG Tbec     90 tablet    TAKE 1 TABLET BY MOUTH 3 TIMES DAILY WITH MEALS. TAKE WITH A SMALL AMOUNT OF ORANGE JUICE. DO NOT TAKE WITH CALCIUM.    Ovarian cancer, right (H), Anemia, unspecified type, Ovarian cancer, left (H)       HERBALS      daily        * iohexol 140 MG/ML Soln solution    OMNIPAQUE    140 mL    Mix entire bottle (50ml) of contast with 600ml (20 ounces) of water and drink half 2 hrs prior to CT scan and half 1 hr prior to scan    Ovarian cancer, right (H), Metastatic cancer (H)       * iohexol 140 MG/ML Soln solution    OMNIPAQUE    50 mL    Take 50 mLs by mouth once for 1 dose    Ovarian cancer, right (H)       LEVOTHYROXINE SODIUM PO      Take by mouth daily        LORazepam 1 MG tablet    ATIVAN    30 tablet    Take 1 tablet (1 mg) by mouth every 6 hours as needed (Anxiety, Nausea/Vomiting or Sleep)    Ovarian cancer, unspecified laterality (H)       magnesium oxide 400 MG tablet    MAG-OX    90 tablet    Take 1 tablet (400 mg) by mouth 2 times daily    Ovarian cancer, unspecified laterality (H)       METFORMIN HCL PO      Take 500 mg by mouth daily        order for DME      3 each    Injection Supplies for Vitamin B12: 3cc syringes w/ 27 gauge needles, 1/2 inch length    B12 deficiency       potassium chloride 20 MEQ Packet    KLOR-CON     Take 20 mEq by mouth daily        prochlorperazine 10 MG tablet    COMPAZINE    30 tablet    Take 1 tablet (10 mg) by mouth every 6 hours as needed (nausea/vomiting)    Encounter for long-term (current) use of medications, Ovarian cancer, right (H), Ovarian cancer, left (H), Drug-induced neutropenia (H)       VITAMIN C PO      Take 500 mg by mouth daily    Pelvic mass       VITAMIN D PO      Take 1,000 Units by mouth daily Unknown dose        VITAMIN E NATURAL PO      Take 100 Units by mouth daily        * Notice:  This list has 2 medication(s) that are the same as other medications prescribed for you. Read the directions carefully, and ask your doctor or other care provider to review them with you.

## 2018-02-09 NOTE — LETTER
"2018       RE: Odalys Nathan  [id number]  [facility] [id number]     Dear Colleague,    Thank you for referring your patient, Odalys Nathan, to the [facility]. Please see a copy of my visit note below.    Gynecologic Oncology Follow-Up Note  RE: Odalys Nathan  MRN: [id number]  : 1960  Date of Visit: 2018    CC: Odalys Nathan is a 57 year old year old female with recurrent stage IIIC bilateral ovarian cancer who presents today for disease management with Doxil.    HPI: Odalys comes to the clinic accompanied by her  Marcos. She is feeling well today but states she feels \"lousy\" the week after chemotherapy with more fatigue and diarrhea. Has difficulty taking in adequate fluids the week after chemotherapy. Developed dysuria the week after chemotherapy as well which resolved spontaneously, relates this to poor fluid intake. Takes magnesium once daily when she feels she can tolerate it- develops diarrhea with this. She continues on ferrous sulfate 325mg TID for history of anemia. She also continues tinnitus- denies need for intervention. Has had many cycles of carboplatin, no history of reaction thus far. No mucosal issues with Doxil. No issues with Avastin, however, has not been able to receive D15 Avastin due to mid-cycle thrombocytopenia. She is eating well, currently drinking well, reports she is ready for chemotherapy today.    Brief Oncology History:  2012 - Admitted to hospital for 2 weeks of intermittent abdominal cramping, distention, diarrhea and N/V. CT of abdomen/pelvis significant for small bowel obstruction, a heterogenous soft tissue density in the pelvis, omental nodules, and ascites. Bilateral adnexal masses per U/S of pelvis. CA-125 was elevated at 987, CEA was normal at 1.0.    12 - Therapeutic paracentesis (4 L) with cytology confirming malignancy (HI positive, weak ER positive, CK-7 positive consistent with GYN " primary). Surgery recommended d/t potential for falling blood counts 2/2 chemotherapy (patient is Sabianism and limiting blood transfusion)    2/1/12 - Exploratory laparotomy, MAR BSO, lysis of adhesion, Appendectomy, Repair cystotomy, Omentectomy Ahll-[id number]-[id number]-transverse-colon resection, Ileo-descending colon anastomosis, CUSA, & Colonoscopy (done by Dr. Amato and colorectal team).  2/20/2012 - Admitted to hospital for bilateral pulmonary emboli and drainage of pleural effusion. Started on Lovenox.  2/28/12-3/21/12: Cycle #1-2 Carbo/Taxol IV  3/29/12 - Started on Keflex by her PCP for infection in her healing wound (immediately below her umbilicus).    4/11/12-6/14/12: Cycle #3-6 IV chemo, Cycle #1 IV/IP chemo  7/16/12 -  8, CT PRADEEP - enrolled in  - observation arm  3/4/13-6/28/13:  6, 9, 12, 15, 20.  7/1/13: CT Chest, Abdomen, Pelvis IMPRESSION:  1. Worsening metastatic ovarian carcinoma suggested by increased size of soft tissue nodules anterior to the right psoas muscle, that may represent growing mesenteric lymphadenopathy.  2. Remaining prominent right lower quadrant mesenteric lymph nodes are not significantly changed from CT 7/16/2012.  3. Clustered nodular opacities in the right lower lobe are not significant change from 7/16/2012 and remain indeterminate. Again, the appearance and distribution is suggestive of an infectious etiology.  4. Stable 8 mm soft tissue nodule in left breast, unchanged since at least 02/20/2012. This can be observed on followup studies, but correlation with mammography could be considered.  Decision made to start Doxil/Carbo.  7/11/13: The left ventricular ejection fraction is normal at 66.4%.  7/12/13-9/6/13: Cycle #1-3 Doxil/Carbo.  10, 11.    9/30/13 CT C/A/P Impression:  1. Overall, favorable response to treatment with decreasing size of soft tissue nodules tracking along the anterior aspect of the right psoas muscle.  2. 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  3. Improved cluster of right lower lobe pulmonary nodular opacities. These may represent resolving infection.  10/3/13-12/9/13:  9, 8, 10. Cycle 4-6 Doxil/Carbo.    1/13/14 CT C/A/P Impression:   1. Stable appearance of metastatic ovarian cancer. Scattered soft tissue nodules along the anterior aspect of the right psoas muscle are unchanged in size. Mild mesenteric lymphadenopathy is unchanged.  2. Clustered micronodules in the right lower lobe are unchanged from 9/30/2013, but improved from 7/1/2013. This history suggests a postinflammatory/postinfectious etiology.  3. 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  1/16/14 Discussed multiple options for her based on relatively stable-appearing disease on CT but slight increase in  (which has had small increase to 20 with last recurrence) including chemo break with recheck of  in 1 month, starting new chemo agent immediately, and exploratory surgery with possible resection of nodules. She is considered platinum-sensitive based on > 1 year remission after Taxol/Carbo, which we will take into consideration for future chemo planning. I am not inclined to surgery at this time given difficulty she already has with diarrhea secondary to past colon resection. I suspect we would need to resect further bowel due to mesenteric disease. Also explained inherent risks of any major surgery. Also mentioned maintenance chemo, but this has not been shown to increase overall survival and would likely decrease her quality of life without significant benefit. Family is going on vacation to [place] in 2 weeks and Odalys does not want to have chemo prior to that, so will plan to take 1 month break. She can have  at that time (discussed checking toady since last draw was in early December, but as it would likely not change treatment plan and she has h/o slow rising , will not check today).  2/17/14  16  "   2/20/14: Decision to take break from chemo for two months, followed by CT and CA-125.    4/21/14  27  4/21/14 CT C/A/P Impression:    1. Increased size of 2 low-attenuation lymph nodes anterior to the right psoas muscle is concerning for worsening metastatic ovarian cancer.    2. New circumferential thickening of a 3.8 cm length segment of distal transverse colon is likely physiologic. Recommend attention on followup imaging.    3. Grossly unchanged size of clustered small nodules versus scarring in the right lower lobe the lungs.    4. Stable thrombosis of the right ovarian vein.  5/14/14: Diagnostic laparoscopy converted to exploratory laparotomy and removal of mesenteric masses, tumor debulking, peritoneal biopsies and intraperitoneal port placement. On laparoscopy, it was noted that there were small nodules on the anterior abdominal wall near the previous incision, small nodules on the right pelvic sidewall as well. Nodules were palpated in the mesentery; however, as it was unable to clarify where the origin of the nodules was, the decision was made to open the patient. On opening there was found to be approximately a 3 cm nodule in the small bowel mesentery and another separate approximately 2 cm nodule in the bowel mesentery. Pelvis without evidence of cancer, some mesenteric lymph nodes were palpated. No evidence otherwise of any disseminated cancer throughout the abdomen.    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 (2/2)  -Largest metastasis: 1.3 cm  -See comment  D: Peritoneum, right paracolic gutter #2, biopsy:  -Fibroadipose tissue with " "granulomatous inflammation surrounding refractile material  -Negative for malignancy   E: Small bowel adhesion, biopsy:  -Fibroconnective tissue, consistent with adhesion  -Negative for malignancy  F: Lymph nodes, mesentry, not otherwise specified, excision:  -Two lymph nodes, negative for metastatic carcinoma (0/2)  G: Lymph node, mesentery, \"#2\", excision:  -One lymph node, negative for metastatic carcinoma (0/1)  H: Lymph nodes, mesentery, \"nodule #2\", excision:  -Five lymph nodes, negative for metastatic carcinoma (0/5)  COMMENT:  Some of the specimens show post-operative changes. Others show possible treatment related changes, including necrosis. The metastatic carcinoma in the mesenteric lymph nodes (specimen C) shows variable morphology, including relatively low grade tumor with papillary architecture, and high grade tumor comprised of nests of tumor cells with irregular, slit-like spaces and marked nuclear pleomorphism.    5/29/14: Cycle 1 IV PACLitaxel / IP CISplatin / IP PACLitaxel.  - 28.  6/26/14: Cycle #2 IV/IP.  10  8/5/14: CT chest/abd/pelvis IMPRESSION     1. In this patient with ovarian cancer, overall findings are indicative of stable/slight improvement, as multiple mesenteric lymphadenopathy and scattered nodular peritoneal soft tissue mass lesions appear unchanged or slightly smaller since 4/21/2014.    2. 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 Mild dilatation of the second and the third portion of the duodenum with a narrow SMA angle. This could represent SMA syndrome, if clinically correlated.17/14:    Cycle #3 IV/IP.  10.    CT chest/abd/pelvis with contrast on 8/5/14    Impression:    1. In this patient with ovarian cancer, overall findings are indicative of stable/slight improvement, as multiple mesenteric lymphadenopathy and scattered nodular peritoneal soft tissue mass lesions appear unchanged or slightly smaller since 4/21/2014.    2. 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 Mild dilatation of the second and the third portion of the duodenum with a narrow SMA angle. This could represent SMA syndrome, if clinically correlated.  8/7/14: Cycle #4 Taxol/Carbo (changed from IV/IP).  10. She has been feeling okay. She is unsure if she can finish out the course of 6 cycles IV/IP taxol/cisplatin. She feels like she has the flu for about a week then starts feeling gradually better after each chemo cycle. Her spouse notes that she actually has been more sick with the treatments than she initially admits here. She was also previously having some rib pain. Denies any rib pain now. Denies any chest pain or shortness of breath.  Plan: discussed recent CT cap results and switching to just IV as she is feeling miserable with IP treatments. Switch to IV carbo/taxol as patient is platinum sensitive.  We also discussed her taking part of the tesaro trial, which would require BRCA testing. She would like to take part in this trial if eligible.  8/20/14: Remove Intraperitoneal Port ( Port and catheter intact - discarded)  8/28/14: Cycle #5 Taxol/Carbo held due to thrombocytopenia.  6.    She denies any vaginal bleeding, no changes in her bowel or bladder habits, no nausea/emesis, no lower extremity edema, and no difficulties eating or sleeping. She denies any abdominal discomfort/bloating, no fevers or chills, and no chest pain or shortness of breath. She states her diarrhea is the same. She reports some fatigue which improves about 1-2 weeks after her chemotherapy. She states she does not need any medication refills and she was told she does not meet the criteria for the TESARO trial. She states she has 3 bags of iv fluids left over from her previous chemotherapy and will give these to herself. She states she is ready for her treatment today.    9/29/14: Cycle #6 Taxol/Carbo  6. Insurance questions regarding GSF coverage today. No  concerns other than fatigue. Taking iron for anemia and does not desire blood transfusion. Using neulasta for neutropenia. Using home IV hydration if needed. Baseline unchanged. No abdominal bloating, constipation, diarrhea, pain, vaginal or rectal bleeding, cough or dyspnea, fluid retention.    10/16/14: Impression:    1. Nodular peritoneal soft tissue mass in the right lower quadrant adjacent to the psoas muscle is no longer appreciated. Adjacent prominent lymphadenopathy is unchanged from previous exam. No new peritoneal lesions.    2. 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    10/20/14:  5. CT chest/abdomen/pelvis on 10/16/14 showed nodular peritoneal soft tissue mass in the right lower quadrant adjacent to the psoas muscle is no longer appreciated. Adjacent prominent lymphadenopathy is unchanged from previous exam. 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  1/27/15:  6.  4/28/15:  14.  5/26/15:  18.  6/2/15: CT cap Impression:  1. Postsurgical changes of hysterectomy and bilateral salpingo-oophorectomy for ovarian cancer. There is a new 8 mm hazy, ill-defined hypoattenuating lesion in hepatic segment 6 which is suspicious for a metastatic deposit. Further evaluation with ultrasound in recommended.    2. Increased size of a left retroperitoneal lymph node which is indeterminate but may represent a ciro metastasis. Mildly prominent lymph nodes in the right lower quadrant are not significantly changed.  3. Moderate colonic stool burden.    6/4/15: US abdomen IMPRESSION:    Hyperechoic lesion in the right hepatic lobe, consistent with hemangioma. This does not corresponds to the area of the lesion seen on CT from 6/2/2015. An MR would be helpful for identifying and characterizing the lesion from the recent CT.  6/12/15: MR abd IMPRESSION:  1. New 20 x 11 mm enhancing lesions between the right obliques, concerning for metastatic  disease. This lesions should be amenable to percutaneous biopsy, if indicated.  2. Correlating to the lesion visualized on comparison CT is a hepatic segment 6 subcapsular 7 mm lesion. Overall the appearance favors the diagnosis of a simple cyst. However, there is faint suggestion of mild peripheral arterial enhancement. Although this is favored as  artifactual, this should be followed up to confirm stability. Recommend 6 month followup.  3. Hepatic segment 6, 5 mm lesion too small to technically characterize. Differential would favor FNH, less likely flash filling hemangioma. Recommend attention on followup.  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     9/1/15: Cycle #1 Avastin/Cytoxan.   31.     9/24/15:feeling generally well. She says she has been having back and stomach spasms. She is taking cytosine daily (she ran out yesterday). She also says its affecting her voice. Admits that it burns occasionally. She is eating and drinking normal. She also admit diarrhea, 5-7 times daily, lose/watery. She trying to stay hydrated and eat fiber. She also says that her body is sore, especially the bottom of her feet. Her blood pressure is normal. She also admits having headache after her first infusion.      9/24/15: Cycle #2 Avastin/Cytoxan.  19.  10/15/15: Cycle #3 Avastin/Cytoxan.  16.     11/6/15: CT c/a/p IMPRESSION:    1. Stable postoperative change of MAR/BSO for ovarian cancer.  2. The lesion in the right flank abdominal musculature is slightly decreased in size. Otherwise, stable examination.  2. No evidence of metastatic disease in the chest.    11/20/15: Treatment planning visit,  16    11/25/15: surgical pathology report  FINAL DIAGNOSIS:  Soft tissue, right oblique muscle mass, excision:  -Recurrent ovarian serous carcinoma  -Carcinoma is present less  than 1 mm from one resection margin  -Background skeletal muscle and fibroadipose tissue    1/4/16:  22  1/11/16-1/27/16: Radiation to right flank x 12 treatments  4/7/2016:  94. CT cap IMPRESSION:    In this patient with ovarian cancer status post MAR/BSO and descending/transverse colectomy:  1. No evidence for malignancy in the chest, abdomen, or pelvis.  2. Stable small hypodense segment 6 liver lesion, appears more likely benign, possibly a cyst.  5/13/16:  124.  6/3/16: PET CT IMPRESSION: In this patient with a history of ovarian cancer:  1. Hypermetabolic and enlarging periaortic and perihepatic lymphadenopathy compatible with metastatic disease, as detailed above.  2. Although hypodense lesion in hepatic segment 6 has been present since 6/2/2015 associated hypermetabolism makes this lesion highly concerning for metastatic disease.    Plan: to start Niraparib under TESARO study.     6/9/16:  137.  6/14/16: Cycle #1 Niraparib.    6/28/16: Cycle #1 D15 Niraparib.    7/5/16:  100.    7/11/16: Cycle #2 Nraparib.   83.    8/3/2016: PET CT IMPRESSION:    In this patient with known history of ovarian cancer:  1) New pleural based nodular opacities in the lateral and inferior aspects of the bilateral lower lobes, worse in the left lung. Likely infection. Close follow up is recommended.      2) Slight decrease in hypermetabolic abdominal lymphadenopathy. 2 hypermetabolic lymph nodes persist.  3) Unchanged right hepatic lobe metastatic lesion.     8/9/16: Cycle #3 Niraparib.  69.  9/6/16: Cycle #4 Niraparib.  53. Dose held due to anemia.  9/13/16: Eval for potential cycle 4 niraparib. Dose held due to anemia.  10/4/16: CT CAP impression:  IMPRESSION: In this patient with a known history of ovarian cancer:  1. There has been interval resolution of pleural-based nodular  opacities which likely represented infection.  2. Abdominal lymphadenopathy in the form of 2 portacaval  lymph nodes  have not significantly changed in size, noted to be hypermetabolic on  prior PET/CT.  3. Previously demonstrated metastatic lesion in the right lobe of the  liver is not significantly changed.  10/11/16:  60  11/1/16: C6 niraparib,  75  11/29/16: C7 niraparib.  78. CT CAP impression as follows:  Target lesions (RECIST criteria):       A previously described target lesion superior to the head of the  pancreas (series 2, image 64)  (referred to as a perihepatic node on  6/3/2016) may not be a valid target lesion because it measured less  than 1.5 cm originally. However, this particular node has decreased in  size, now measuring 7 mm in short axis versus 14 mm on 6/3/2016 when  measured in a similar fashion.       2.3 cm short axis portacaval lymph node on series 2 image 67,  previously 2.0 cm on 10/4/2016.       1.2 cm subtle hypodensity in hepatic segment 6 on series 2 image 75,  stable on multiple studies since at least 6/3/2016     Sum of diameters today: 3.5 cm. Sum of diameters 10/4/2016: 3.2 cm.  Growth = 9%.    12/27/16: C8 niraparib.  105.  1/25/17: C9 niraparib.  108.  2/23/17: C10 niraparib.  132.   CT CAP impression:  Sum of target lesion diameters today: 3.7 cm. Sum of target lesion  diameters on 11/28/2016: 3.5 cm. Growth= 6%  1. In this patient with history of ovarian cancer there is stable  disease by RECIST criteria as evidenced by:   1a. Mildly increased size of liver metastasis.  1b. Stable portacaval lymphadenopathy.  1c. No evidence of metastatic disease in the chest.  2. Trace emphysematous changes of the lungs.  3/22/17: C11 niraparib.  132.  4/19/17: C12 niraparib.  127.  5/16/17: CT CAP IMPRESSION: In this patient with ovarian cancer:  1. Mildly increased size of hepatic metastasis segment 6 with subtle increased capsular retraction.  2. Stable edmundo hepatis nodes, with mild increase in size of periaortic lymph nodes.  3. Prominent  left supraclavicular lymph node with subtle increase in size compared to prior studies, particularly comparing to 10/4/2016.  4. Mild subtle groundglass opacities in the right upper lobe, not present on prior study, lesser extent in the right lower lobe.  Findings may represent infection, additional consideration is malignancy (less likely), and attention on follow-up study  Recommended.  Addendum:   Prominent left supraclavicular lymph node (3/25) is stable from most recent CT performed 2/20/2017, currently measuring 14 x 16 mm, previously 14 x 16 mm on 2/20/2017.      The portal caval lymph node/edmundo hepatis lymph node is stable from 2/20/2017, measuring 21 mm.      Clarification of size of the para-aortic lymph node (series 3 image 327). It short axis measurement is 11 mm versus 9 mm on prior study.      Hepatic segment 6 triangular-shaped low density lesion (3/362) measures 14 mm, previously measured 12 mm, demonstrating a  possible/questionable minimal subtle increase in size. Similarly the lymph nodes noted above in the supraclavicular and para-aortic regions demonstrate possible minimal possible subtle increase in size.      5/18/17: Cycle #13 Niraparib.  191.  6/15/17: Cycle #14 Niraparib.  146.  7/13/17: Cycle #15 Niraparib 200 mg.  171     CT (8/9/17):       IMPRESSION:  1. Segment 6 hepatic metastasis is stable to minimally increased in  size.  2. Slight increase in multicentric adenopathy, most pronounced at the  edmundo hepatis. Additional sites include the inferior left  neck/supraclavicular region and retroperitoneum which appear stable to  minimally increased.      8/10/17:  175  9/14/17: MUGA LVEF 54%  9/22/17: C1D1 carboplatin/Doxil.  187.  10/19/17: C2D1 carboplatin/Doxil.  108.  11/17/17: C3D1 carboplatin/Doxil.  82.  12/14/17: C4D1 carboplatin/Doxil.  83.  1/12/18: C5D1 carboplatin/Doxil.  80.  2/9/18: C6D1 carboplatin/Doxil.   pending.    Past Medical History:   Diagnosis Date     Antiplatelet or antithrombotic long-term use      Ascites      Blood clot in the legs      Diabetes (H)      Ovarian cancer (H)     serous,stg IV     Pleural effusion      Pulmonary embolism (H) 2/2012     Refusal of blood transfusions as patient is [place]      Short gut syndrome      Subclinical hypothyroidism 4/18/2013     Thrombosis of leg        Past Surgical History:   Procedure Laterality Date     COLECTOMY       COLONOSCOPY  2/1/2012    Procedure:COLONOSCOPY; With Biopsy; Surgeon:TONI SULLIVAN; Location:[facility] OR     HYSTERECTOMY TOTAL ABD, RHIANNA SALPINGO-OOPHORECTOMY, NODE DISSECTION, TUMOR DEBULKING, COMBINED  2/1/2012    Procedure:COMBINED HYSTERECTOMY TOTAL ABDOMINAL, BILATERAL SALPINGO-OOPHORECTOMY, NODE DISSECTION, TUMOR DEBULKING;  Exploratory Laparotomy, Total Abdominal Hysterectomy, Bilateral Salpingo-Oophorectomy, appendectomy,lysis of adhesions, ileal, ascending, transverse and splenic flexure resection, ileal descending bowel renanastomosis, incidental cystotomy repair, CUSA procedure and colonoscopy ; [facility]     INSERT PORT PERITONEAL ACCESS  4/3/2012    Procedure:INSERT PORT PERITONEAL ACCESS; Intraperitoneal Port Placement (c-arm); Surgeon:SAMUEL CARRASCO; Location:[facility] OR     INSERT PORT PERITONEAL ACCESS  5/14/2014    Procedure: INSERT PORT PERITONEAL ACCESS;  Surgeon: Nga Yeung MD;  Location: [facility] OR     INSERT PORT VASCULAR ACCESS       LAPAROSCOPY DIAGNOSTIC (GYN)  5/14/2014    Procedure: LAPAROSCOPY DIAGNOSTIC (GYN);  Surgeon: Nga Yeung MD;  Location: [facility] OR     LAPAROTOMY EXPLORATORY Right 11/25/2015    Procedure: LAPAROTOMY EXPLORATORY;  Surgeon: Nga Yeung MD;  Location: [facility] OR     LAPAROTOMY, TUMOR DEBULKING, COMBINED  5/14/2014    Procedure: COMBINED LAPAROTOMY, TUMOR DEBULKING;  Surgeon: Nga Yeung MD;  Location: [facility] OR     REMOVE CATHETER PERITONEAL N/A 8/20/2014    Procedure:  REMOVE CATHETER PERITONEAL;  Surgeon: Nga Yeung MD;  Location: [facility] OR     VASCULAR SURGERY      stent left iliac vein       Current Outpatient Prescriptions   Medication     Ferrous Sulfate 324 (65 FE) MG TBEC     iohexol (OMNIPAQUE) 140 MG/ML SOLN solution     sulfamethoxazole-trimethoprim (BACTRIM DS/SEPTRA DS) 800-160 MG per tablet     LORazepam (ATIVAN) 1 MG tablet     prochlorperazine (COMPAZINE) 10 MG tablet     LEVOTHYROXINE SODIUM PO     order for DME     METFORMIN HCL PO     diphenoxylate-atropine (LOMOTIL) 2.5-0.025 MG per tablet     cyanocobalamin (VITAMIN B12) 1000 MCG/ML injection     magnesium oxide (MAG-OX) 400 MG tablet     ASPIRIN PO     potassium chloride (KLOR-CON) 20 MEQ packet     VITAMIN E NATURAL PO     Cholecalciferol (VITAMIN D PO)     HERBALS     Ascorbic Acid (VITAMIN C PO)     Calcium Carbonate-Vitamin D (CALCIUM + D PO)     No current facility-administered medications for this visit.      Facility-Administered Medications Ordered in Other Visits   Medication     heparin 100 UNIT/ML injection 500 Units          No Known Allergies    Family History   Problem Relation Age of Onset     CANCER Mother 69     lung, smoker     CANCER Maternal Uncle 65     brain     Colon Cancer Maternal Aunt 80     colon       Social History     Social History     Marital status:      Spouse name: N/A     Number of children: N/A     Years of education: N/A     Occupational History     Not on file.     Social History Main Topics     Smoking status: Former Smoker     Years: 8.00     Types: Cigarettes     Quit date: 6/21/1980     Smokeless tobacco: Never Used      Comment: started at 9 yo and quit at 16 yo     Alcohol use Yes      Comment: 3x/day wine or jodi     Drug use: No     Sexual activity: Not on file     Other Topics Concern     Not on file     Social History Narrative       Review of Systems     Constitutional:  Positive for fatigue. Negative for fever, chills, weight loss, weight gain,  decreased appetite, night sweats, recent stressors, height gain, height loss, post-operative complications, incisional pain, hallucinations, increased energy, hyperactivity and confused.   HENT:  Positive for tinnitus. Negative for ear pain, hearing loss, nosebleeds, trouble swallowing, hoarse voice, mouth sores, sore throat, ear discharge, tooth pain, gum tenderness, taste disturbance, smell disturbance, hearing aid, bleeding gums, dry mouth, sinus pain, sinus congestion and neck mass.    Eyes:  Negative for double vision, pain, redness, eye pain, decreased vision, eye watering, eye bulging, eye dryness, flashing lights, spots, floaters, strabismus, tunnel vision, jaundice and eye irritation.   Respiratory:   Negative for cough, hemoptysis, sputum production, shortness of breath, wheezing, sleep disturbances due to breathing, snores loudly, respiratory pain, dyspnea on exertion, cough disturbing sleep and postural dyspnea.    Cardiovascular:  Negative for chest pain, dyspnea on exertion, palpitations, orthopnea, claudication, leg swelling, fingers/toes turn blue, hypertension, hypotension, syncope, history of heart murmur, chest pain on exertion, chest pain at rest, pacemaker, few scattered varicosities, leg pain, sleep disturbances due to breathing, tachycardia, light-headedness, exercise intolerance and edema.   Gastrointestinal:  Positive for diarrhea. Negative for heartburn, nausea, vomiting, abdominal pain, constipation, blood in stool, melena, rectal pain, bloating, hemorrhoids, bowel incontinence, jaundice, rectal bleeding, coffee ground emesis and change in stool.   Genitourinary:  Negative for bladder incontinence, dysuria, urgency, hematuria, flank pain, vaginal discharge, difficulty urinating, genital sores, dyspareunia, decreased libido, nocturia, voiding less frequently, arousal difficulty, abnormal vaginal bleeding, excessive menstruation, menstrual changes, hot flashes, vaginal dryness and  postmenopausal bleeding.   Musculoskeletal:  Negative for myalgias, back pain, joint swelling, arthralgias, stiffness, muscle cramps, neck pain, bone pain, muscle weakness and fracture.   Skin:  Negative for nail changes, itching, poor wound healing, rash, hair changes, skin changes, acne, warts, poor wound healing, scarring, flaky skin, Raynaud's phenomenon, sensitivity to sunlight and skin thickening.   Neurological:  Negative for dizziness, tingling, tremors, speech change, seizures, loss of consciousness, weakness, light-headedness, numbness, headaches, disturbances in coordination, extremity numbness, memory loss, difficulty walking and paralysis.   Endo/Heme:  Negative for anemia, swollen glands and bruises/bleeds easily.   Psychiatric/Behavioral:  Negative for depression, hallucinations, memory loss, decreased concentration, mood swings and panic attacks.    Breast:  Negative for breast discharge, breast mass, breast pain and nipple retraction.   Endocrine:  Negative for altered temperature regulation, polyphagia, polydipsia, unwanted hair growth and change in facial hair.        Physical Exam:    /79 (BP Location: Right arm, Patient Position: Chair, Cuff Size: Adult Regular)  Pulse 94  Temp 97.9  F (36.6  C) (Oral)  Wt 64 kg (141 lb 1.6 oz)  SpO2 100%  BMI 23.48 kg/m2    General: Alert non-toxic appearing female in no acute distress  HEENT: Normocephalic, atraumatic; PERRLA; no scleral icterus; oropharynx pink without lesions; neck supple  Pulmonary: Lungs clear to auscultation, no increased work of breathing noted  Cardiac: Regular rate and rhythm, S1S2, no clicks, murmurs, rubs, or gallops; bilateral lower extremities without edema  GI: Normoactive bowel sounds x4 quadrants, abdomen soft, non-distended, and non-tender to palpation without masses or organomegaly  : Not indicated  Heme: Cervical, supraclavicular, and inguinal nodes without lymphadenopathy  MSK: Moves all extremities, no obvious  muscle wasting  Neuro: No gross deficits, normal gait  Skin: Appropriate color for race, warm and dry, no rashes or lesions to unclothed skin; no palmar plantar erythrodysesthesia  Psych: Pleasant and interactive, affect bright, makes appropriate eye contact, thought process linear    Labs:      2/9/2018  Day 1   Hemoglobin 11.7 - 15.7 g/dL 11.1 (A)   Hematocrit 35.0 - 47.0 % 32.4 (A)   Platelet Count 150 - 450 10e9/L 120 (A)   Absolute Neutrophil 1.6 - 8.3 10e9/L 1.9   Sodium 133 - 144 mmol/L 139   Potassium 3.4 - 5.3 mmol/L 3.4   Chloride 94 - 109 mmol/L 106   Carbon Dioxide 20 - 32 mmol/L 24   Urea Nitrogen 7 - 30 mg/dL 18   Creatinine 0.52 - 1.04 mg/dL 0.76   Calcium 8.5 - 10.1 mg/dL 8.7   Magnesium 1.6 - 2.3 mg/dL 1.4 (A)   Bilirubin Total 0.2 - 1.3 mg/dL 0.3   ALT 0 - 50 U/L 25   AST 0 - 45 U/L 19   Alkaline Phosphatase 40 - 150 U/L 124   Albumin 3.4 - 5.0 g/dL 3.3 (A)   Protein Total 6.8 - 8.8 g/dL 7.2   WBC 4.0 - 11.0 10e9/L 4.1    pending    Assessment/Plan:  1) Recurrent stage IIIC bilateral ovarian cancer: Currently feeling well other than fatigue. Tolerating chemotherapy without dose limiting side effects, no apparent palmar plantar erythrodysesthesia. Proceed with C6D1 carboplatin/Doxil/Avastin as ordered by Dr. Yeung. To have CT imaging and treatment planning after cycle six with Dr. Riggins during Dr. Yeung's absence. Continue with energy conservation and periods of rest/activity for fatigue management. IV magnesium replacement today- continue mag ox 400mg PO once daily and to increase dietary sources of magnesium. Urinary sxs resolved, but UA with reflex to UC placed as standing order should this recur during treatment. To receive 1L NS IV next week for support. Reviewed risks of carboplatin reaction and s/sxs reaction and when to call her nurse/seek further care. Reviewed risks with Avastin including bleeding and GI perforation. Reviewed signs and symptoms for when she should contact the  clinic or seek additional care, including but not limited to fever, chills, inability to keep down food or fluids, nausea and vomiting not controlled with antiemetics, and diarrhea leading to dehydration. Patient to contact the clinic with any questions or concerns in the interim.   2) Genetic counseling: Testing has been performed and she is negative for mutations in BRCA1, BRCA2, EPCAM, MLH1, MSH2, MSH6, PMS2, PTEN, and TP53 genes  3) Health maintenance issues discussed include to continue following up with PCP for non-gynecologic concerns.  4) Patient verbalized understanding of and agreement with plan    A total of 20 minutes spent with patient, over 50% spent in counseling and coordination of care.    HAILE De Paz, FNP-C  Family Nurse Practitioner  Gynecologic Oncology  [facility]  Pager: [phone]

## 2018-02-09 NOTE — PROGRESS NOTES
Infusion Nursing Note:  Odalys Nathan presents today for C6D1 Doxil-Carboplatin (#17)-Avastin-Magnesium Replacement.    Patient seen by provider today: Yes: Patricia Rocha NP    Treatment Conditions:  Lab Results   Component Value Date    HGB 11.1 02/09/2018     Lab Results   Component Value Date    WBC 4.1 02/09/2018      Lab Results   Component Value Date    ANEU 1.9 02/09/2018     Lab Results   Component Value Date     02/09/2018      Lab Results   Component Value Date     02/09/2018                   Lab Results   Component Value Date    POTASSIUM 3.4 02/09/2018           Lab Results   Component Value Date    MAG 1.4 02/09/2018            Lab Results   Component Value Date    CR 0.76 02/09/2018                   Lab Results   Component Value Date    JOSE ALBERTO 8.7 02/09/2018                Lab Results   Component Value Date    BILITOTAL 0.3 02/09/2018           Lab Results   Component Value Date    ALBUMIN 3.3 02/09/2018                    Lab Results   Component Value Date    ALT 25 02/09/2018           Lab Results   Component Value Date    AST 19 02/09/2018       Results reviewed, labs MET treatment parameters, ok to proceed with treatment.  ECHO/MUGA completed 9/14/17  EF 54%.  Urine: negative for protein  BP:123/79    Intravenous Access:  Implanted Port.  Access dc'd at time of discharge.      Note:   Results reviewed, copy given to patient.  Proceed with treatment.    30 minute observation post Carboplatin.  Copy of AVS given to patient. + Blood return from PORT pre and post infusion.  Tolerated infusion without incident. No Prescriptions filled today.   D/C in care of spouse.  Pt will return 2/16 for IVF and 2/23 for Avastin @ [facility].       Susanne Gore RN

## 2018-02-09 NOTE — NURSING NOTE
"Oncology Rooming Note    February 9, 2018 11:17 AM   Odalys Nathan is a 57 year old female who presents for:    Chief Complaint   Patient presents with     Port Draw     labs drawn via port by RN     Oncology Clinic Visit     Ovarian CA, f/u     Initial Vitals: /79 (BP Location: Right arm, Patient Position: Chair, Cuff Size: Adult Regular)  Pulse 94  Temp 97.9  F (36.6  C) (Oral)  Wt 64 kg (141 lb 1.6 oz)  SpO2 100%  BMI 23.48 kg/m2 Estimated body mass index is 23.48 kg/(m^2) as calculated from the following:    Height as of 12/14/17: 1.651 m (5' 5\").    Weight as of this encounter: 64 kg (141 lb 1.6 oz). Body surface area is 1.71 meters squared.  No Pain (0) Comment: Data Unavailable   No LMP recorded. Patient has had a hysterectomy.  Allergies reviewed: Yes  Medications reviewed: Yes    Medications: Medication refills not needed today.  Pharmacy name entered into CrossCurrent:    [id number] - [place], [place] - [address] [address] [address] [place] [facility] [place] [address]  [id number] - [place], [place] - [address] [facility] [place], [place] - [phone]    Clinical concerns: Patient states there are no new concerns to discuss with provider.  Patricia Rocha was not notified.       6 minutes for nursing intake (face to face time)     Sophia Oneill CMA              "

## 2018-02-12 DIAGNOSIS — C56.1 OVARIAN CANCER, RIGHT (H): Primary | ICD-10-CM

## 2018-02-16 ENCOUNTER — INFUSION THERAPY VISIT (OUTPATIENT)
Dept: INFUSION THERAPY | Facility: CLINIC | Age: 58
End: 2018-02-16
Attending: OBSTETRICS & GYNECOLOGY
Payer: COMMERCIAL

## 2018-02-16 VITALS
HEART RATE: 83 BPM | RESPIRATION RATE: 16 BRPM | SYSTOLIC BLOOD PRESSURE: 119 MMHG | OXYGEN SATURATION: 100 % | DIASTOLIC BLOOD PRESSURE: 66 MMHG | TEMPERATURE: 97.7 F

## 2018-02-16 DIAGNOSIS — C56.2 OVARIAN CANCER, LEFT (H): ICD-10-CM

## 2018-02-16 DIAGNOSIS — C56.1 OVARIAN CANCER, RIGHT (H): Primary | ICD-10-CM

## 2018-02-16 DIAGNOSIS — E83.42 HYPOMAGNESEMIA: ICD-10-CM

## 2018-02-16 PROCEDURE — 96360 HYDRATION IV INFUSION INIT: CPT

## 2018-02-16 PROCEDURE — 25000128 H RX IP 250 OP 636: Performed by: NURSE PRACTITIONER

## 2018-02-16 RX ADMIN — SODIUM CHLORIDE 1000 ML: 9 INJECTION, SOLUTION INTRAVENOUS at 12:30

## 2018-02-16 NOTE — PROGRESS NOTES
Infusion Nursing Note:  Odalys Nathan presents today for IVF.    Patient seen by provider today: No   present during visit today: Not Applicable.    Note: N/A.    Intravenous Access:  Implanted Port.    Treatment Conditions:  Not Applicable.      Post Infusion Assessment:  Patient tolerated infusion without incident.  Blood return noted pre and post infusion.  Site patent and intact, free from redness, edema or discomfort.  No evidence of extravasations.  Access discontinued per protocol.    Discharge Plan:   Discharge instructions reviewed with: Patient.  AVS to patient via [employer] Wireless[employer].  Patient will return 2/23 for next appointment.   Patient discharged in stable condition accompanied by: self.  Departure Mode: Ambulatory.    Keisha Ferguson RN

## 2018-02-16 NOTE — MR AVS SNAPSHOT
After Visit Summary   2/16/2018    Odalys Nathan    MRN: [id number]           Patient Information     Date Of Birth          1960        Visit Information        Provider Department      2/16/2018 12:30 PM [facility] INFUSION CHAIR 3 [facility]        Today's Diagnoses     Ovarian cancer, right (H)    -  1    Ovarian cancer, left (H)        Hypomagnesemia           Follow-ups after your visit        Your next 10 appointments already scheduled     Feb 23, 2018 10:00 AM CST   Level 2 with RH INFUSION CHAIR 8   [facility] ([facility])    [facility]  [address]  [facility] [address]   [phone]            Mar 02, 2018 12:15 PM CST   [facility] with  [facility]   [facility] ([facility])    [address]  [address]  [facility] [id number] [phone]            Mar 02, 2018 12:40 PM CST   (Arrive by 12:25 PM)   CT CHEST/ABDOMEN/PELVIS W CONTRAST with UCCT2   [facility] ([facility])    [address]  1st Floor  [facility] [address] [phone]           Please bring any scans or X-rays taken at other hospitals, if similar tests were done. Also bring a list of your medicines, including vitamins, minerals and over-the-counter drugs. It is safest to leave personal items at home.  Be sure to tell your doctor:   If you have any allergies.   If there s any chance you are pregnant.   If you are breastfeeding.  You may have contrast for this exam. To prepare:   Do not eat or drink for 2 hours before your exam. If you need to take medicine, you may take it with small sips of water. (We may ask you to take liquid medicine as well.)   The day before your exam, drink extra fluids at least six 8-ounce glasses (unless your doctor tells you to restrict your fluids).   You  will be given instructions on how to drink the contrast.  Patients over 70 or patients with diabetes or kidney problems:   If you haven t had a blood test (creatinine test) within the last 30 days, the Cardiologist/Radiologist may require you to get this test prior to your exam.  If you have diabetes:   Continue to take your metformin medication on the day of your exam  Please wear loose clothing, such as a sweat suit or jogging clothes. Avoid snaps, zippers and other metal. We may ask you to undress and put on a hospital gown.  If you have any questions, please call the Imaging Department where you will have your exam.            Mar 05, 2018  4:40 PM CST   (Arrive by 4:25 PM)   Return Visit with Garry Riggins MD   [facility] ([facility])    [address] [place] [address]  [address]  [facility] [address] [phone]              Who to contact     If you have questions or need follow up information about today's clinic visit or your schedule please contact [facility] directly at [phone].  Normal or non-critical lab and imaging results will be communicated to you by MyoKardiahart, letter or phone within 4 business days after the clinic has received the results. If you do not hear from us within 7 days, please contact the clinic through MyoKardiahart or phone. If you have a critical or abnormal lab result, we will notify you by phone as soon as possible.  Submit refill requests through Klutch or call your pharmacy and they will forward the refill request to us. Please allow 3 business days for your refill to be completed.          Additional Information About Your Visit        Klutch Information     Klutch gives you secure access to your electronic health record. If you see a primary care provider, you can also send messages to your care team and make appointments. If you have questions, please call your primary care clinic.  If you do  not have a primary care provider, please call [phone] and they will assist you.        Care EveryWhere ID     This is your Care EveryWhere ID. This could be used by other organizations to access your [facility] medical records  [id number]        Your Vitals Were     Pulse Temperature Respirations Pulse Oximetry          83 97.7  F (36.5  C) (Tympanic) 16 100%         Blood Pressure from Last 3 Encounters:   02/16/18 119/66   02/09/18 123/79   01/26/18 (!) 119/94    Weight from Last 3 Encounters:   02/09/18 64 kg (141 lb 1.6 oz)   01/26/18 63.2 kg (139 lb 7 oz)   01/12/18 64.4 kg (142 lb)              Today, you had the following     No orders found for display         Today's Medication Changes          These changes are accurate as of 2/16/18  2:24 PM.  If you have any questions, ask your nurse or doctor.               These medicines have changed or have updated prescriptions.        Dose/Directions    magnesium oxide 400 MG tablet   Commonly known as:  MAG-OX   This may have changed:  when to take this   Used for:  Ovarian cancer, unspecified laterality (H)        Dose:  400 mg   Take 1 tablet (400 mg) by mouth 2 times daily   Quantity:  90 tablet   Refills:  3                Primary Care Provider Office Phone # Fax #    Aubrie Hester [phone]       [facility] [address] [facility] [address]        Equal Access to Services     SANDY CHAMBERS AH: Hadii bj Walter, waaxda lulucíaadaha, qaybta kaalmada brandonda, blanka szymanski. So [facility] [phone].    ATENCIÓN: Si habla español, tiene a bell disposición servicios gratuitos de asistencia lingüística. Liban al [phone].    We comply with applicable federal civil rights laws and [place] laws. We do not discriminate on the basis of race, color, national origin, age, disability, sex, sexual orientation, or gender identity.            Thank you!     Thank you for choosing [facility]  for your care. Our goal is always to provide you with excellent care. Hearing back from our patients is one way we can continue to improve our services. Please take a few minutes to complete the written survey that you may receive in the mail after your visit with us. Thank you!             Your Updated Medication List - Protect others around you: Learn how to safely use, store and throw away your medicines at www.disposemymeds.org.          This list is accurate as of 2/16/18  2:24 PM.  Always use your most recent med list.                   Brand Name Dispense Instructions for use Diagnosis    ASPIRIN PO      Take 325 mg by mouth daily        CALCIUM + D PO      Take 1 tablet by mouth daily.    Pelvic mass       cyanocobalamin 1000 MCG/ML injection    VITAMIN B12    1 mL    Inject 1 mL (1,000 mcg) into the muscle every 30 days    B12 deficiency       diphenoxylate-atropine 2.5-0.025 MG per tablet    LOMOTIL    60 tablet    Take 2 tablets by mouth 4 times daily as needed for diarrhea    Acute diarrhea       Ferrous Sulfate 324 (65 FE) MG Tbec     90 tablet    TAKE 1 TABLET BY MOUTH 3 TIMES DAILY WITH MEALS. TAKE WITH A SMALL AMOUNT OF ORANGE JUICE. DO NOT TAKE WITH CALCIUM.    Ovarian cancer, right (H), Anemia, unspecified type, Ovarian cancer, left (H)       HERBALS      daily        iohexol 140 MG/ML Soln solution    OMNIPAQUE    140 mL    Mix entire bottle (50ml) of contast with 600ml (20 ounces) of water and drink half 2 hrs prior to CT scan and half 1 hr prior to scan    Ovarian cancer, right (H), Metastatic cancer (H)       LEVOTHYROXINE SODIUM PO      Take by mouth daily        LORazepam 1 MG tablet    ATIVAN    30 tablet    Take 1 tablet (1 mg) by mouth every 6 hours as needed (Anxiety, Nausea/Vomiting or Sleep)    Ovarian cancer, unspecified laterality (H)       magnesium oxide 400 MG tablet    MAG-OX    90 tablet    Take 1 tablet (400 mg) by mouth 2 times daily     Ovarian cancer, unspecified laterality (H)       METFORMIN HCL PO      Take 500 mg by mouth daily        order for DME     3 each    Injection Supplies for Vitamin B12: 3cc syringes w/ 27 gauge needles, 1/2 inch length    B12 deficiency       potassium chloride 20 MEQ Packet    KLOR-CON     Take 20 mEq by mouth daily        prochlorperazine 10 MG tablet    COMPAZINE    30 tablet    Take 1 tablet (10 mg) by mouth every 6 hours as needed (nausea/vomiting)    Encounter for long-term (current) use of medications, Ovarian cancer, right (H), Ovarian cancer, left (H), Drug-induced neutropenia (H)       VITAMIN C PO      Take 500 mg by mouth daily    Pelvic mass       VITAMIN D PO      Take 1,000 Units by mouth daily Unknown dose        VITAMIN E NATURAL PO      Take 100 Units by mouth daily

## 2018-02-23 ENCOUNTER — INFUSION THERAPY VISIT (OUTPATIENT)
Dept: INFUSION THERAPY | Facility: CLINIC | Age: 58
End: 2018-02-23
Attending: OBSTETRICS & GYNECOLOGY
Payer: COMMERCIAL

## 2018-02-23 ENCOUNTER — HOSPITAL ENCOUNTER (OUTPATIENT)
Facility: CLINIC | Age: 58
Setting detail: SPECIMEN
Discharge: HOME | End: 2018-02-23
Attending: OBSTETRICS & GYNECOLOGY | Admitting: NURSE PRACTITIONER
Payer: COMMERCIAL

## 2018-02-23 VITALS
RESPIRATION RATE: 16 BRPM | DIASTOLIC BLOOD PRESSURE: 69 MMHG | OXYGEN SATURATION: 100 % | TEMPERATURE: 98.2 F | HEART RATE: 84 BPM | SYSTOLIC BLOOD PRESSURE: 134 MMHG

## 2018-02-23 DIAGNOSIS — E83.42 HYPOMAGNESEMIA: ICD-10-CM

## 2018-02-23 DIAGNOSIS — C56.1 OVARIAN CANCER, RIGHT (H): ICD-10-CM

## 2018-02-23 DIAGNOSIS — Z79.899 ENCOUNTER FOR LONG-TERM (CURRENT) USE OF MEDICATIONS: Primary | ICD-10-CM

## 2018-02-23 DIAGNOSIS — C56.2 OVARIAN CANCER, LEFT (H): ICD-10-CM

## 2018-02-23 DIAGNOSIS — D70.2 DRUG-INDUCED NEUTROPENIA (H): ICD-10-CM

## 2018-02-23 LAB
ALBUMIN UR-MCNC: NEGATIVE MG/DL
BASOPHILS # BLD AUTO: 0 10E9/L (ref 0–0.2)
BASOPHILS NFR BLD AUTO: 0.4 %
DIFFERENTIAL METHOD BLD: ABNORMAL
EOSINOPHIL # BLD AUTO: 0.1 10E9/L (ref 0–0.7)
EOSINOPHIL NFR BLD AUTO: 0.9 %
ERYTHROCYTE [DISTWIDTH] IN BLOOD BY AUTOMATED COUNT: 14.4 % (ref 10–15)
HCT VFR BLD AUTO: 30.9 % (ref 35–47)
HGB BLD-MCNC: 10.6 G/DL (ref 11.7–15.7)
IMM GRANULOCYTES # BLD: 0 10E9/L (ref 0–0.4)
IMM GRANULOCYTES NFR BLD: 0.4 %
LYMPHOCYTES # BLD AUTO: 2.1 10E9/L (ref 0.8–5.3)
LYMPHOCYTES NFR BLD AUTO: 37.7 %
MAGNESIUM SERPL-MCNC: 1.5 MG/DL (ref 1.6–2.3)
MCH RBC QN AUTO: 39.6 PG (ref 26.5–33)
MCHC RBC AUTO-ENTMCNC: 34.3 G/DL (ref 31.5–36.5)
MCV RBC AUTO: 115 FL (ref 78–100)
MONOCYTES # BLD AUTO: 0.6 10E9/L (ref 0–1.3)
MONOCYTES NFR BLD AUTO: 10.5 %
NEUTROPHILS # BLD AUTO: 2.8 10E9/L (ref 1.6–8.3)
NEUTROPHILS NFR BLD AUTO: 50.1 %
NRBC # BLD AUTO: 0 10*3/UL
NRBC BLD AUTO-RTO: 0 /100
PLATELET # BLD AUTO: 75 10E9/L (ref 150–450)
POTASSIUM SERPL-SCNC: 3.6 MMOL/L (ref 3.4–5.3)
RBC # BLD AUTO: 2.68 10E12/L (ref 3.8–5.2)
WBC # BLD AUTO: 5.5 10E9/L (ref 4–11)

## 2018-02-23 PROCEDURE — 81003 URINALYSIS AUTO W/O SCOPE: CPT | Performed by: NURSE PRACTITIONER

## 2018-02-23 PROCEDURE — 25000128 H RX IP 250 OP 636: Performed by: NURSE PRACTITIONER

## 2018-02-23 PROCEDURE — 84132 ASSAY OF SERUM POTASSIUM: CPT | Performed by: NURSE PRACTITIONER

## 2018-02-23 PROCEDURE — 96365 THER/PROPH/DIAG IV INF INIT: CPT

## 2018-02-23 PROCEDURE — 83735 ASSAY OF MAGNESIUM: CPT | Performed by: NURSE PRACTITIONER

## 2018-02-23 PROCEDURE — 85025 COMPLETE CBC W/AUTO DIFF WBC: CPT | Performed by: NURSE PRACTITIONER

## 2018-02-23 RX ORDER — HEPARIN SODIUM (PORCINE) LOCK FLUSH IV SOLN 100 UNIT/ML 100 UNIT/ML
500 SOLUTION INTRAVENOUS DAILY PRN
Status: CANCELLED
Start: 2018-02-23

## 2018-02-23 RX ORDER — MAGNESIUM SULFATE HEPTAHYDRATE 40 MG/ML
2 INJECTION, SOLUTION INTRAVENOUS ONCE
Status: COMPLETED | OUTPATIENT
Start: 2018-02-23 | End: 2018-02-23

## 2018-02-23 RX ORDER — HEPARIN SODIUM (PORCINE) LOCK FLUSH IV SOLN 100 UNIT/ML 100 UNIT/ML
500 SOLUTION INTRAVENOUS DAILY PRN
Status: DISCONTINUED | OUTPATIENT
Start: 2018-02-23 | End: 2018-02-23 | Stop reason: HOSPADM

## 2018-02-23 RX ADMIN — MAGNESIUM SULFATE IN WATER 2 G: 40 INJECTION, SOLUTION INTRAVENOUS at 11:20

## 2018-02-23 RX ADMIN — SODIUM CHLORIDE, PRESERVATIVE FREE 500 UNITS: 5 INJECTION INTRAVENOUS at 12:25

## 2018-02-23 NOTE — PROGRESS NOTES
Infusion Nursing Note:  Odalys Nathan presents today for chemo /deferred with thrombocytopenia (doxorubicin/carboplatin.    Patient seen by provider today: No   present during visit today: Not Applicable.    Note: N/A.    Intravenous Access:  Labs drawn without difficulty.  Implanted Port.    Treatment Conditions:  Lab Results   Component Value Date    HGB 10.6 02/23/2018     Lab Results   Component Value Date    WBC 5.5 02/23/2018      Lab Results   Component Value Date    ANEU 2.8 02/23/2018     Lab Results   Component Value Date    PLT 75 02/23/2018      Lab Results   Component Value Date     02/09/2018                   Lab Results   Component Value Date    POTASSIUM 3.6 02/23/2018           Lab Results   Component Value Date    MAG 1.5 02/23/2018            Lab Results   Component Value Date    CR 0.76 02/09/2018                   Lab Results   Component Value Date    JOSE ALBERTO 8.7 02/09/2018                Lab Results   Component Value Date    BILITOTAL 0.3 02/09/2018           Lab Results   Component Value Date    ALBUMIN 3.3 02/09/2018                    Lab Results   Component Value Date    ALT 25 02/09/2018           Lab Results   Component Value Date    AST 19 02/09/2018       Results reviewed, labs MET treatment parameters, ok to proceed with treatment.      Post Infusion Assessment:  Patient tolerated infusion without incident.  Blood return noted pre and post infusion.  Site patent and intact, free from redness, edema or discomfort.  Access discontinued per protocol.    Discharge Plan:   Discharge instructions reviewed with: Patient.  Patient and/or family verbalized understanding of discharge instructions and all questions answered.  Copy of AVS reviewed with patient and/or family.  Patient will return March 2 for  for next appointment.  Patient discharged in stable condition accompanied by: self and .  Departure Mode: Ambulatory.    Brenda Manzano RN

## 2018-02-23 NOTE — MR AVS SNAPSHOT
After Visit Summary   2/23/2018    Odalys Nathan    MRN: [id number]           Patient Information     Date Of Birth          1960        Visit Information        Provider Department      2/23/2018 10:00 AM  INFUSION CHAIR 8 [facility]        Today's Diagnoses     Encounter for long-term (current) use of medications    -  1    Ovarian cancer, right (H)        Ovarian cancer, left (H)        Drug-induced neutropenia (H)        Hypomagnesemia           Follow-ups after your visit        Your next 10 appointments already scheduled     Mar 02, 2018 12:15 PM CST   [facility] Draw with  [facility] DRAW   [facility] Draw ([facility])    [address]  [address]  [place] [facility] [id number] [phone]            Mar 02, 2018 12:40 PM CST   (Arrive by 12:25 PM)   CT CHEST/ABDOMEN/PELVIS W CONTRAST with UCCT2   [facility] CT ([facility])    [address]  1st Floor  [place] [id number] [phone]           Please bring any scans or X-rays taken at other hospitals, if similar tests were done. Also bring a list of your medicines, including vitamins, minerals and over-the-counter drugs. It is safest to leave personal items at home.  Be sure to tell your doctor:   If you have any allergies.   If there s any chance you are pregnant.   If you are breastfeeding.  You may have contrast for this exam. To prepare:   Do not eat or drink for 2 hours before your exam. If you need to take medicine, you may take it with small sips of water. (We may ask you to take liquid medicine as well.)   The day before your exam, drink extra fluids at least six 8-ounce glasses (unless your doctor tells you to restrict your fluids).   You will be given instructions on how to drink the contrast.  Patients over 70 or patients with diabetes or kidney problems:   If you haven t had a blood test  (creatinine test) within the last 30 days, the Cardiologist/Radiologist may require you to get this test prior to your exam.  If you have diabetes:   Continue to take your metformin medication on the day of your exam  Please wear loose clothing, such as a sweat suit or jogging clothes. Avoid snaps, zippers and other metal. We may ask you to undress and put on a hospital gown.  If you have any questions, please call the Imaging Department where you will have your exam.            Mar 05, 2018  4:40 PM CST   (Arrive by 4:25 PM)   Return Visit with Garry Riggins MD   [facility]    [address]  [address]  [place] [address] [phone]              Who to contact     If you have questions or need follow up information about today's clinic visit or your schedule please contact [facility] directly at [phone].  Normal or non-critical lab and imaging results will be communicated to you by ITS KOOLhart, letter or phone within 4 business days after the clinic has received the results. If you do not hear from us within 7 days, please contact the clinic through [facility] or phone. If you have a critical or abnormal lab result, we will notify you by phone as soon as possible.  Submit refill requests through [employer] or call your pharmacy and they will forward the refill request to us. Please allow 3 business days for your refill to be completed.          Additional Information About Your Visit        ITS [employer] Information     [employer] gives you secure access to your electronic health record. If you see a primary care provider, you can also send messages to your care team and make appointments. If you have questions, please call your primary care clinic.  If you do not have a primary care provider, please call [phone] and they will assist you.        Care EveryWhere ID     This is your Care EveryWhere ID. This  could be used by other organizations to access your [place] medical records  [id number]        Your Vitals Were     Pulse Temperature Respirations Pulse Oximetry          84 98.2  F (36.8  C) (Tympanic) 16 100%         Blood Pressure from Last 3 Encounters:   02/23/18 134/69   02/16/18 119/66   02/09/18 123/79    Weight from Last 3 Encounters:   02/09/18 64 kg (141 lb 1.6 oz)   01/26/18 63.2 kg (139 lb 7 oz)   01/12/18 64.4 kg (142 lb)              We Performed the Following     CBC with platelets differential     Magnesium     Potassium     Protein qualitative urine          Today's Medication Changes          These changes are accurate as of 2/23/18 12:10 PM.  If you have any questions, ask your nurse or doctor.               These medicines have changed or have updated prescriptions.        Dose/Directions    magnesium oxide 400 MG tablet   Commonly known as:  MAG-OX   This may have changed:  when to take this   Used for:  Ovarian cancer, unspecified laterality (H)        Dose:  400 mg   Take 1 tablet (400 mg) by mouth 2 times daily   Quantity:  90 tablet   Refills:  3                Primary Care Provider Office Phone # Fax #    Aubrie Hester [phone]       [facility] [address] [facility] [address]        Equal Access to Services     SANDY CHAMBERS AH: Felicia landao Socait, waaxda luqadaha, qaybta kaalmada adeegyada, blanka szymanski. So [facility] [phone].    ATENCIÓN: Si habla español, tiene a bell disposición servicios gratUNM Hospitalos de asistencia lingüística. Llame al [phone].    We comply with applicable federal civil rights laws and [place] laws. We do not discriminate on the basis of race, color, national origin, age, disability, sex, sexual orientation, or gender identity.            Thank you!     Thank you for choosing [facility]  for your care. Our goal is always to provide you with  excellent care. Hearing back from our patients is one way we can continue to improve our services. Please take a few minutes to complete the written survey that you may receive in the mail after your visit with us. Thank you!             Your Updated Medication List - Protect others around you: Learn how to safely use, store and throw away your medicines at www.disposemymeds.org.          This list is accurate as of 2/23/18 12:10 PM.  Always use your most recent med list.                   Brand Name Dispense Instructions for use Diagnosis    ASPIRIN PO      Take 325 mg by mouth daily        CALCIUM + D PO      Take 1 tablet by mouth daily.    Pelvic mass       cyanocobalamin 1000 MCG/ML injection    VITAMIN B12    1 mL    Inject 1 mL (1,000 mcg) into the muscle every 30 days    B12 deficiency       diphenoxylate-atropine 2.5-0.025 MG per tablet    LOMOTIL    60 tablet    Take 2 tablets by mouth 4 times daily as needed for diarrhea    Acute diarrhea       Ferrous Sulfate 324 (65 FE) MG Tbec     90 tablet    TAKE 1 TABLET BY MOUTH 3 TIMES DAILY WITH MEALS. TAKE WITH A SMALL AMOUNT OF ORANGE JUICE. DO NOT TAKE WITH CALCIUM.    Ovarian cancer, right (H), Anemia, unspecified type, Ovarian cancer, left (H)       HERBALS      daily        iohexol 140 MG/ML Soln solution    OMNIPAQUE    140 mL    Mix entire bottle (50ml) of contast with 600ml (20 ounces) of water and drink half 2 hrs prior to CT scan and half 1 hr prior to scan    Ovarian cancer, right (H), Metastatic cancer (H)       LEVOTHYROXINE SODIUM PO      Take by mouth daily        LORazepam 1 MG tablet    ATIVAN    30 tablet    Take 1 tablet (1 mg) by mouth every 6 hours as needed (Anxiety, Nausea/Vomiting or Sleep)    Ovarian cancer, unspecified laterality (H)       magnesium oxide 400 MG tablet    MAG-OX    90 tablet    Take 1 tablet (400 mg) by mouth 2 times daily    Ovarian cancer, unspecified laterality (H)       METFORMIN HCL PO      Take 500 mg by mouth  daily        order for DME     3 each    Injection Supplies for Vitamin B12: 3cc syringes w/ 27 gauge needles, 1/2 inch length    B12 deficiency       potassium chloride 20 MEQ Packet    KLOR-CON     Take 20 mEq by mouth daily        prochlorperazine 10 MG tablet    COMPAZINE    30 tablet    Take 1 tablet (10 mg) by mouth every 6 hours as needed (nausea/vomiting)    Encounter for long-term (current) use of medications, Ovarian cancer, right (H), Ovarian cancer, left (H), Drug-induced neutropenia (H)       VITAMIN C PO      Take 500 mg by mouth daily    Pelvic mass       VITAMIN D PO      Take 1,000 Units by mouth daily Unknown dose        VITAMIN E NATURAL PO      Take 100 Units by mouth daily

## 2018-03-02 ENCOUNTER — RADIANT APPOINTMENT (OUTPATIENT)
Dept: CT IMAGING | Facility: CLINIC | Age: 58
End: 2018-03-02
Attending: NURSE PRACTITIONER
Payer: COMMERCIAL

## 2018-03-02 ENCOUNTER — TELEPHONE (OUTPATIENT)
Dept: ONCOLOGY | Facility: CLINIC | Age: 58
End: 2018-03-02

## 2018-03-02 DIAGNOSIS — C56.1 OVARIAN CANCER, RIGHT (H): ICD-10-CM

## 2018-03-02 DIAGNOSIS — C56.2 OVARIAN CANCER, LEFT (H): ICD-10-CM

## 2018-03-02 LAB
ALBUMIN SERPL-MCNC: 3.5 G/DL (ref 3.4–5)
ALP SERPL-CCNC: 137 U/L (ref 40–150)
ALT SERPL W P-5'-P-CCNC: 24 U/L (ref 0–50)
ANION GAP SERPL CALCULATED.3IONS-SCNC: 10 MMOL/L (ref 3–14)
AST SERPL W P-5'-P-CCNC: 20 U/L (ref 0–45)
BASOPHILS # BLD AUTO: 0 10E9/L (ref 0–0.2)
BASOPHILS NFR BLD AUTO: 0.5 %
BILIRUB SERPL-MCNC: 0.4 MG/DL (ref 0.2–1.3)
BUN SERPL-MCNC: 12 MG/DL (ref 7–30)
CALCIUM SERPL-MCNC: 9.3 MG/DL (ref 8.5–10.1)
CANCER AG125 SERPL-ACNC: 49 U/ML (ref 0–30)
CHLORIDE SERPL-SCNC: 105 MMOL/L (ref 94–109)
CO2 SERPL-SCNC: 21 MMOL/L (ref 20–32)
CREAT SERPL-MCNC: 0.69 MG/DL (ref 0.52–1.04)
DIFFERENTIAL METHOD BLD: ABNORMAL
EOSINOPHIL # BLD AUTO: 0 10E9/L (ref 0–0.7)
EOSINOPHIL NFR BLD AUTO: 0.9 %
ERYTHROCYTE [DISTWIDTH] IN BLOOD BY AUTOMATED COUNT: 14.8 % (ref 10–15)
GFR SERPL CREATININE-BSD FRML MDRD: 87 ML/MIN/1.7M2
GLUCOSE SERPL-MCNC: 94 MG/DL (ref 70–99)
HCT VFR BLD AUTO: 31 % (ref 35–47)
HGB BLD-MCNC: 10.5 G/DL (ref 11.7–15.7)
IMM GRANULOCYTES # BLD: 0 10E9/L (ref 0–0.4)
IMM GRANULOCYTES NFR BLD: 0.2 %
LYMPHOCYTES # BLD AUTO: 1.9 10E9/L (ref 0.8–5.3)
LYMPHOCYTES NFR BLD AUTO: 44.9 %
MAGNESIUM SERPL-MCNC: 1.2 MG/DL (ref 1.6–2.3)
MCH RBC QN AUTO: 39.6 PG (ref 26.5–33)
MCHC RBC AUTO-ENTMCNC: 33.9 G/DL (ref 31.5–36.5)
MCV RBC AUTO: 117 FL (ref 78–100)
MONOCYTES # BLD AUTO: 0.7 10E9/L (ref 0–1.3)
MONOCYTES NFR BLD AUTO: 15.4 %
NEUTROPHILS # BLD AUTO: 1.6 10E9/L (ref 1.6–8.3)
NEUTROPHILS NFR BLD AUTO: 38.1 %
NRBC # BLD AUTO: 0 10*3/UL
NRBC BLD AUTO-RTO: 1 /100
PLATELET # BLD AUTO: 66 10E9/L (ref 150–450)
POTASSIUM SERPL-SCNC: 3.3 MMOL/L (ref 3.4–5.3)
PROT SERPL-MCNC: 7.3 G/DL (ref 6.8–8.8)
RBC # BLD AUTO: 2.65 10E12/L (ref 3.8–5.2)
SODIUM SERPL-SCNC: 136 MMOL/L (ref 133–144)
WBC # BLD AUTO: 4.2 10E9/L (ref 4–11)

## 2018-03-02 PROCEDURE — 85025 COMPLETE CBC W/AUTO DIFF WBC: CPT | Performed by: OBSTETRICS & GYNECOLOGY

## 2018-03-02 PROCEDURE — 86304 IMMUNOASSAY TUMOR CA 125: CPT | Performed by: OBSTETRICS & GYNECOLOGY

## 2018-03-02 PROCEDURE — 25000128 H RX IP 250 OP 636: Performed by: OBSTETRICS & GYNECOLOGY

## 2018-03-02 PROCEDURE — 80053 COMPREHEN METABOLIC PANEL: CPT | Performed by: OBSTETRICS & GYNECOLOGY

## 2018-03-02 PROCEDURE — 83735 ASSAY OF MAGNESIUM: CPT | Performed by: OBSTETRICS & GYNECOLOGY

## 2018-03-02 RX ORDER — IOPAMIDOL 755 MG/ML
86 INJECTION, SOLUTION INTRAVASCULAR ONCE
Status: COMPLETED | OUTPATIENT
Start: 2018-03-02 | End: 2018-03-02

## 2018-03-02 RX ORDER — HEPARIN SODIUM (PORCINE) LOCK FLUSH IV SOLN 100 UNIT/ML 100 UNIT/ML
500 SOLUTION INTRAVENOUS ONCE
Status: COMPLETED | OUTPATIENT
Start: 2018-03-02 | End: 2018-03-02

## 2018-03-02 RX ORDER — HEPARIN SODIUM (PORCINE) LOCK FLUSH IV SOLN 100 UNIT/ML 100 UNIT/ML
5 SOLUTION INTRAVENOUS EVERY 8 HOURS PRN
Status: DISCONTINUED | OUTPATIENT
Start: 2018-03-02 | End: 2018-03-10 | Stop reason: HOSPADM

## 2018-03-02 RX ADMIN — HEPARIN SODIUM (PORCINE) LOCK FLUSH IV SOLN 100 UNIT/ML 500 UNITS: 100 SOLUTION at 14:57

## 2018-03-02 RX ADMIN — IOPAMIDOL 86 ML: 755 INJECTION, SOLUTION INTRAVASCULAR at 12:56

## 2018-03-02 RX ADMIN — SODIUM CHLORIDE, PRESERVATIVE FREE 5 ML: 5 INJECTION INTRAVENOUS at 12:23

## 2018-03-02 NOTE — DISCHARGE INSTRUCTIONS

## 2018-03-02 NOTE — NURSING NOTE
Chief Complaint   Patient presents with     Port Draw     Labs drawn via port by RN. Line flushed and hep locked, ready for CT scan     Grecia Toth RN

## 2018-03-02 NOTE — TELEPHONE ENCOUNTER
Left message for Odalys that her  dropped to 49.  HAILE De Paz, FNP-C  Gynecologic Oncology  [facility]  Pager: [phone]

## 2018-03-05 ENCOUNTER — ONCOLOGY VISIT (OUTPATIENT)
Dept: ONCOLOGY | Facility: CLINIC | Age: 58
End: 2018-03-05
Attending: OBSTETRICS & GYNECOLOGY
Payer: COMMERCIAL

## 2018-03-05 VITALS
HEIGHT: 65 IN | TEMPERATURE: 98.2 F | OXYGEN SATURATION: 100 % | BODY MASS INDEX: 23.36 KG/M2 | HEART RATE: 105 BPM | WEIGHT: 140.2 LBS | DIASTOLIC BLOOD PRESSURE: 58 MMHG | SYSTOLIC BLOOD PRESSURE: 108 MMHG | RESPIRATION RATE: 17 BRPM

## 2018-03-05 DIAGNOSIS — C56.9 OVARIAN CANCER, UNSPECIFIED LATERALITY (H): Primary | ICD-10-CM

## 2018-03-05 PROCEDURE — G0463 HOSPITAL OUTPT CLINIC VISIT: HCPCS | Mod: ZF

## 2018-03-05 PROCEDURE — 99215 OFFICE O/P EST HI 40 MIN: CPT | Mod: ZP | Performed by: OBSTETRICS & GYNECOLOGY

## 2018-03-05 ASSESSMENT — ENCOUNTER SYMPTOMS
CHILLS: 0
HALLUCINATIONS: 0
TASTE DISTURBANCE: 0
POLYDIPSIA: 0
HEARTBURN: 0
ABDOMINAL PAIN: 0
DIARRHEA: 1
SINUS CONGESTION: 0
HOARSE VOICE: 1
FEVER: 0
TROUBLE SWALLOWING: 0
WEIGHT LOSS: 0
NAUSEA: 0
BLOOD IN STOOL: 0
ALTERED TEMPERATURE REGULATION: 0
FATIGUE: 1
SORE THROAT: 0
RECTAL PAIN: 0
JAUNDICE: 0
SMELL DISTURBANCE: 0
VOMITING: 0
CONSTIPATION: 0
BOWEL INCONTINENCE: 0
POLYPHAGIA: 0
BLOATING: 0
SINUS PAIN: 1
WEIGHT GAIN: 0
NECK MASS: 0
INCREASED ENERGY: 1
NIGHT SWEATS: 0
DECREASED APPETITE: 0

## 2018-03-05 ASSESSMENT — PAIN SCALES - GENERAL: PAINLEVEL: NO PAIN (0)

## 2018-03-05 NOTE — NURSING NOTE
"Oncology Rooming Note    March 5, 2018 1:56 PM   Odalys Nathan is a 57 year old female who presents for:    Chief Complaint   Patient presents with     Oncology Clinic Visit     return patient visit for follow up/CT results/treatment planning related to Ovarian cancer, right (H)     Initial Vitals: /58  Pulse 105  Temp 98.2  F (36.8  C) (Oral)  Resp 17  Ht 1.651 m (5' 5\")  Wt 63.6 kg (140 lb 3.2 oz)  SpO2 100%  Breastfeeding? No  BMI 23.33 kg/m2 Estimated body mass index is 23.33 kg/(m^2) as calculated from the following:    Height as of this encounter: 1.651 m (5' 5\").    Weight as of this encounter: 63.6 kg (140 lb 3.2 oz). Body surface area is 1.71 meters squared.  No Pain (0) Comment: Data Unavailable   No LMP recorded. Patient has had a hysterectomy.  Allergies reviewed: Yes  Medications reviewed: Yes    Medications: Medication refills not needed today.  Pharmacy name entered into Xinguodu:    [id number] - [place], [place] - [address] [facility] - [place], [place] - [address]  SURENDRA SCRIPT  [facility] - [place], [place] - [address] [place] [address] PHARMACY [place], [place] - [phone]    Clinical concerns: no concerns dr. parrish was notified.    6 minutes for nursing intake (face to face time)     Dre Perdomo CMA              "

## 2018-03-05 NOTE — MR AVS SNAPSHOT
"              After Visit Summary   3/5/2018    Odalys Nathan    MRN: [id number]           Patient Information     Date Of Birth          1960        Visit Information        Provider Department      3/5/2018 2:00 PM Garry Riggins MD [facility]        Today's Diagnoses     Ovarian cancer, unspecified laterality (H)    -  1       Follow-ups after your visit        Who to contact     If you have questions or need follow up information about today's clinic visit or your schedule please contact [facility] directly at [phone].  Normal or non-critical lab and imaging results will be communicated to you by [employer], letter or phone within 4 business days after the clinic has received the results. If you do not hear from us within 7 days, please contact the clinic through [employer] or phone. If you have a critical or abnormal lab result, we will notify you by phone as soon as possible.  Submit refill requests through [facility] or call your pharmacy and they will forward the refill request to us. Please allow 3 business days for your refill to be completed.          Additional Information About Your Visit        MyChart Information     [facility] gives you secure access to your electronic health record. If you see a primary care provider, you can also send messages to your care team and make appointments. If you have questions, please call your primary care clinic.  If you do not have a primary care provider, please call [phone] and they will assist you.        Care EveryWhere ID     This is your Care EveryWhere ID. This could be used by other organizations to access your [place] medical records  [id number]        Your Vitals Were     Pulse Temperature Respirations Height Pulse Oximetry Breastfeeding?    105 98.2  F (36.8  C) (Oral) 17 1.651 m (5' 5\") 100% No    BMI (Body Mass Index)                   23.33 kg/m2            Blood Pressure from Last 3 Encounters: "   03/05/18 108/58   02/23/18 134/69   02/16/18 119/66    Weight from Last 3 Encounters:   03/05/18 63.6 kg (140 lb 3.2 oz)   02/09/18 64 kg (141 lb 1.6 oz)   01/26/18 63.2 kg (139 lb 7 oz)              Today, you had the following     No orders found for display       Primary Care Provider Office Phone # Fax #    Aubrie Hester [phone]       [facility] [address] [address] [facility] [address]        Equal Access to Services     MATEO [facility] HospitalFLASH : Hadii bj santana hadasho Soomaali, waaxda luqadaha, qaybta kaalmada adedileep, blanka beckman . So [facility] [phone].    ATENCIÓN: Si habla español, tiene a bell disposición servicios gratuitos de asistencia lingüística. LlSouthern [facility] Center [phone].    We comply with applicable federal civil rights laws and [place] laws. We do not discriminate on the basis of race, color, national origin, age, disability, sex, sexual orientation, or gender identity.            Thank you!     Thank you for choosing [facility]  for your care. Our goal is always to provide you with excellent care. Hearing back from our patients is one way we can continue to improve our services. Please take a few minutes to complete the written survey that you may receive in the mail after your visit with us. Thank you!             Your Updated Medication List - Protect others around you: Learn how to safely use, store and throw away your medicines at www.disposemymeds.org.          This list is accurate as of 3/5/18 11:59 PM.  Always use your most recent med list.                   Brand Name Dispense Instructions for use Diagnosis    ASPIRIN PO      Take 325 mg by mouth daily        CALCIUM + D PO      Take 1 tablet by mouth daily.    Pelvic mass       cyanocobalamin 1000 MCG/ML injection    VITAMIN B12    1 mL    Inject 1 mL (1,000 mcg) into the muscle every 30 days    B12 deficiency       diphenoxylate-atropine 2.5-0.025 MG per tablet     LOMOTIL    60 tablet    Take 2 tablets by mouth 4 times daily as needed for diarrhea    Acute diarrhea       Ferrous Sulfate 324 (65 FE) MG Tbec     90 tablet    TAKE 1 TABLET BY MOUTH 3 TIMES DAILY WITH MEALS. TAKE WITH A SMALL AMOUNT OF ORANGE JUICE. DO NOT TAKE WITH CALCIUM.    Ovarian cancer, right (H), Anemia, unspecified type, Ovarian cancer, left (H)       HERBALS      daily        iohexol 140 MG/ML Soln solution    OMNIPAQUE    140 mL    Mix entire bottle (50ml) of contast with 600ml (20 ounces) of water and drink half 2 hrs prior to CT scan and half 1 hr prior to scan    Ovarian cancer, right (H), Metastatic cancer (H)       LEVOTHYROXINE SODIUM PO      Take by mouth daily        LORazepam 1 MG tablet    ATIVAN    30 tablet    Take 1 tablet (1 mg) by mouth every 6 hours as needed (Anxiety, Nausea/Vomiting or Sleep)    Ovarian cancer, unspecified laterality (H)       magnesium oxide 400 MG tablet    MAG-OX    90 tablet    Take 1 tablet (400 mg) by mouth 2 times daily    Ovarian cancer, unspecified laterality (H)       METFORMIN HCL PO      Take 500 mg by mouth daily        order for DME     3 each    Injection Supplies for Vitamin B12: 3cc syringes w/ 27 gauge needles, 1/2 inch length    B12 deficiency       potassium chloride 20 MEQ Packet    KLOR-CON     Take 20 mEq by mouth daily        prochlorperazine 10 MG tablet    COMPAZINE    30 tablet    Take 1 tablet (10 mg) by mouth every 6 hours as needed (nausea/vomiting)    Encounter for long-term (current) use of medications, Ovarian cancer, right (H), Ovarian cancer, left (H), Drug-induced neutropenia (H)       VITAMIN C PO      Take 500 mg by mouth daily    Pelvic mass       VITAMIN D PO      Take 1,000 Units by mouth daily Unknown dose        VITAMIN E NATURAL PO      Take 100 Units by mouth daily

## 2018-03-05 NOTE — LETTER
3/5/2018       RE: Odalys Nathan  [address] [facility] [place] [address]     Dear Colleague,    Thank you for referring your patient, Odalys Nathan, to the [facility]. Please see a copy of my visit note below.                Follow Up Notes on Referred Patient    Date: 3/5/2018       Dr. Aubrie Hester  [facility]  [address] [place] [address]       RE: Odalys Nathan  : 1960  HOLA: 3/5/2018    Dear Dr. Aubrie Hester:    Odalys Nathan is a 57 year old woman with a diagnosis of recurrent platinum sensitive ovarian cancer.   This is a patient of Dr. Yeung's who is here for followup treatment planning.  She just finished 6 cycles of carbo/Doxil, of which the last 2 were with Avastin.  She feels very fatigued.  She is otherwise eating and drinking well.  No nausea or vomiting, fevers or chills.  No vaginal bleeding.  No B symptoms.  She again had a total of 24 cycles of carboplatin.  She has been on niraparib study for about a year ago.   has previously declined with 6 cycles today.  The most recent one was 49.  She also has a positive treatment response on the CT scan.           Past Medical History:    Past Medical History:   Diagnosis Date     Antiplatelet or antithrombotic long-term use      Ascites      Blood clot in the legs      Diabetes (H)      Ovarian cancer (H)     serous,stg IV     Pleural effusion      Pulmonary embolism (H) 2012     Refusal of blood transfusions as patient is Caodaism      Short gut syndrome      Subclinical hypothyroidism 2013     Thrombosis of leg          Past Surgical History:    Past Surgical History:   Procedure Laterality Date     COLECTOMY       COLONOSCOPY  2012    Procedure:COLONOSCOPY; With Biopsy; Surgeon:TONI SULLIVAN; [facility]     HYSTERECTOMY TOTAL ABD, RHIANNA SALPINGO-OOPHORECTOMY, NODE DISSECTION, TUMOR DEBULKING, COMBINED  2012    Procedure:COMBINED  HYSTERECTOMY TOTAL ABDOMINAL, BILATERAL SALPINGO-OOPHORECTOMY, NODE DISSECTION, TUMOR DEBULKING;  Exploratory Laparotomy, Total Abdominal Hysterectomy, Bilateral Salpingo-Oophorectomy, appendectomy,lysis of adhesions, ileal, ascending, transverse and splenic flexure resection, ileal descending bowel renanastomosis, incidental cystotomy repair, CUSA procedure and colonoscopy ; [facility]     INSERT PORT PERITONEAL ACCESS  4/3/2012    Procedure:INSERT PORT PERITONEAL ACCESS; Intraperitoneal Port Placement (c-arm); Surgeon:SAMUEL CARRASCO; [facility]     INSERT PORT PERITONEAL ACCESS  5/14/2014    Procedure: INSERT PORT PERITONEAL ACCESS;  Surgeon: Nga Yeung MD;  Location: [facility]     INSERT PORT VASCULAR ACCESS       LAPAROSCOPY DIAGNOSTIC (GYN)  5/14/2014    Procedure: LAPAROSCOPY DIAGNOSTIC (GYN);  Surgeon: Nga Yeung MD;  [facility]     LAPAROTOMY EXPLORATORY Right 11/25/2015    Procedure: LAPAROTOMY EXPLORATORY;  Surgeon: Nga Yeung MD;  [facility]     LAPAROTOMY, TUMOR DEBULKING, COMBINED  5/14/2014    Procedure: COMBINED LAPAROTOMY, TUMOR DEBULKING;  Surgeon: Nga Yeung MD;  [facility]     REMOVE CATHETER PERITONEAL N/A 8/20/2014    Procedure: REMOVE CATHETER PERITONEAL;  Surgeon: Nga Yeung MD;  [facility]     VASCULAR SURGERY      stent left iliac vein         Health Maintenance Due   Topic Date Due     TETANUS IMMUNIZATION (SYSTEM ASSIGNED)  08/03/1976     LIPID SCREEN Q5 YR FEMALE (SYSTEM ASSIGNED)  08/03/2003     ADVANCE DIRECTIVE PLANNING Q5 YRS  08/03/2013     MAMMO SCREEN Q2 YR (SYSTEM ASSIGNED)  02/01/2015     PAP SCREENING Q3 YR (SYSTEM ASSIGNED)  04/04/2016       Current Medications:     Current Outpatient Prescriptions   Medication Sig Dispense Refill     Ferrous Sulfate 324 (65 FE) MG TBEC TAKE 1 TABLET BY MOUTH 3 TIMES DAILY WITH MEALS. TAKE WITH A SMALL AMOUNT OF ORANGE JUICE. DO NOT TAKE WITH CALCIUM. 90 tablet 1     iohexol  (OMNIPAQUE) 140 MG/ML SOLN solution Mix entire bottle (50ml) of contast with 600ml (20 ounces) of water and drink half 2 hrs prior to CT scan and half 1 hr prior to scan 140 mL 0     LORazepam (ATIVAN) 1 MG tablet Take 1 tablet (1 mg) by mouth every 6 hours as needed (Anxiety, Nausea/Vomiting or Sleep) 30 tablet 2     LEVOTHYROXINE SODIUM PO Take by mouth daily        order for DME Injection Supplies for Vitamin B12: 3cc syringes w/ 27 gauge needles, 1/2 inch length 3 each 0     METFORMIN HCL PO Take 500 mg by mouth daily       diphenoxylate-atropine (LOMOTIL) 2.5-0.025 MG per tablet Take 2 tablets by mouth 4 times daily as needed for diarrhea 60 tablet 0     cyanocobalamin (VITAMIN B12) 1000 MCG/ML injection Inject 1 mL (1,000 mcg) into the muscle every 30 days 1 mL 11     ASPIRIN PO Take 325 mg by mouth daily       potassium chloride (KLOR-CON) 20 MEQ packet Take 20 mEq by mouth daily       VITAMIN E NATURAL PO Take 100 Units by mouth daily       Cholecalciferol (VITAMIN D PO) Take 1,000 Units by mouth daily Unknown dose       HERBALS daily        Ascorbic Acid (VITAMIN C PO) Take 500 mg by mouth daily        Calcium Carbonate-Vitamin D (CALCIUM + D PO) Take 1 tablet by mouth daily.       prochlorperazine (COMPAZINE) 10 MG tablet Take 1 tablet (10 mg) by mouth every 6 hours as needed (nausea/vomiting) (Patient not taking: Reported on 12/14/2017) 30 tablet 2     magnesium oxide (MAG-OX) 400 MG tablet Take 1 tablet (400 mg) by mouth 2 times daily (Patient not taking: Reported on 3/5/2018) 90 tablet 3         Allergies:      No Known Allergies     Social History:     Social History   Substance Use Topics     Smoking status: Former Smoker     Years: 8.00     Types: Cigarettes     Quit date: 6/21/1980     Smokeless tobacco: Never Used      Comment: started at 11 yo and quit at 16 yo     Alcohol use Yes      Comment: 3x/day wine or jodi       History   Drug Use No         Family History:       Family History   Problem  "Relation Age of Onset     CANCER Mother 69     lung, smoker     CANCER Maternal Uncle 65     brain     Colon Cancer Maternal Aunt 80     colon         Physical Exam:     /58  Pulse 105  Temp 98.2  F (36.8  C) (Oral)  Resp 17  Ht 1.651 m (5' 5\")  Wt 63.6 kg (140 lb 3.2 oz)  SpO2 100%  Breastfeeding? No  BMI 23.33 kg/m2  Body mass index is 23.33 kg/(m^2).    General Appearance: healthy and alert, no distress     Musculoskeletal: extremities non tender and without edema    Neurological: normal gait, no gross defects     Psychiatric: appropriate mood and affect                 Assessment:    Odalys Nathan is a 57 year old woman with a diagnosis of recurrent platinum sensitive ovarian cancer.     A total of 40minutes was spent with the patient, 35 minutes of which were spent in counseling the patient and/or treatment planning.    1.  Recurrent platinum-sensitive ovarian cancer.   2.  Positive treatment response with  of 49.     3.  Positive radiologic treatment response.        I had a long discussion with the patient as well as with her .   has declined to 49.  This is a positive treatment response.  She still has a couple of nodules in the lungs, which are stable and has overall a positive response to treatment.  The patient feels very fatigued.  The last couple of cycles have taken quite a toll on her.  We discussed 3 options:  Either do a treatment break for 3 months, versus continue with single-agent Avastin, versus olaparib PARP inhibitor based on recent platinum response, of note she has been prior on a niraparib trial, which was stopped due to treatment protocol.  She would like to proceed with a treatment break for 3 months.  She will see Dr. Yeung for that with another  as well as a CT scan at that time.  They are very appreciative of her care.  All questions were answered.  They agree with the plan.       Garry Riggins MD, MS    Department " of Obstetrics and Gynecology   Division of Gynecologic Oncology   [facility]  Phone: [phone]        CC  Patient Care Team:  Aubrie Hester as PCP - General  Tammy Flores, RN as Continuity Care Coordinator (Oncology)  Nga Yeung MD as MD (Oncology)  Patricia Rocha APRN CNP as Nurse Practitioner (Nurse Practitioner)

## 2018-03-05 NOTE — PROGRESS NOTES
Follow Up Notes on Referred Patient    Date: 3/5/2018       Dr. Aubrie Hester  [facility]  [address] [place] [address]       RE: Odalys Nathan  : 1960  HOLA: 3/5/2018    Dear Dr. Aubrie Hester:    Odalys Nathan is a 57 year old woman with a diagnosis of recurrent platinum sensitive ovarian cancer.   This is a patient of Dr. Yeung's who is here for followup treatment planning.  She just finished 6 cycles of carbo/Doxil, of which the last 2 were with Avastin.  She feels very fatigued.  She is otherwise eating and drinking well.  No nausea or vomiting, fevers or chills.  No vaginal bleeding.  No B symptoms.  She again had a total of 24 cycles of carboplatin.  She has been on niraparib study for about a year ago.   has previously declined with 6 cycles today.  The most recent one was 49.  She also has a positive treatment response on the CT scan.           Past Medical History:    Past Medical History:   Diagnosis Date     Antiplatelet or antithrombotic long-term use      Ascites      Blood clot in the legs      Diabetes (H)      Ovarian cancer (H)     serous,stg IV     Pleural effusion      Pulmonary embolism (H) 2012     Refusal of blood transfusions as patient is Rastafarian      Short gut syndrome      Subclinical hypothyroidism 2013     Thrombosis of leg          Past Surgical History:    Past Surgical History:   Procedure Laterality Date     COLECTOMY       COLONOSCOPY  2012    Procedure:COLONOSCOPY; With Biopsy; Surgeon:TONI SULLIVAN; [facility]     HYSTERECTOMY TOTAL ABD, RHIANNA SALPINGO-OOPHORECTOMY, NODE DISSECTION, TUMOR DEBULKING, COMBINED  2012    Procedure:COMBINED HYSTERECTOMY TOTAL ABDOMINAL, BILATERAL SALPINGO-OOPHORECTOMY, NODE DISSECTION, TUMOR DEBULKING;  Exploratory Laparotomy, Total Abdominal Hysterectomy, Bilateral Salpingo-Oophorectomy, appendectomy,lysis of adhesions, ileal, ascending, transverse  and splenic flexure resection, ileal descending bowel renanastomosis, incidental cystotomy repair, CUSA procedure and colonoscopy ; [facility]     INSERT PORT PERITONEAL ACCESS  4/3/2012    Procedure:INSERT PORT PERITONEAL ACCESS; Intraperitoneal Port Placement (c-arm); Surgeon:SAMUEL CARRASCO; [facility]     INSERT PORT PERITONEAL ACCESS  5/14/2014    Procedure: INSERT PORT PERITONEAL ACCESS;  Surgeon: Nga Yeung MD;  Location: [facility]     INSERT PORT VASCULAR ACCESS       LAPAROSCOPY DIAGNOSTIC (GYN)  5/14/2014    Procedure: LAPAROSCOPY DIAGNOSTIC (GYN);  Surgeon: Nga Yeung MD;  [facility]     LAPAROTOMY EXPLORATORY Right 11/25/2015    Procedure: LAPAROTOMY EXPLORATORY;  Surgeon: Nga Yeung MD;  [facility]     LAPAROTOMY, TUMOR DEBULKING, COMBINED  5/14/2014    Procedure: COMBINED LAPAROTOMY, TUMOR DEBULKING;  Surgeon: Nga Yeung MD;  [facility]     REMOVE CATHETER PERITONEAL N/A 8/20/2014    Procedure: REMOVE CATHETER PERITONEAL;  Surgeon: Nga Yeung MD;  [facility]     VASCULAR SURGERY      stent left iliac vein         Health Maintenance Due   Topic Date Due     TETANUS IMMUNIZATION (SYSTEM ASSIGNED)  08/03/1976     LIPID SCREEN Q5 YR FEMALE (SYSTEM ASSIGNED)  08/03/2003     ADVANCE DIRECTIVE PLANNING Q5 YRS  08/03/2013     MAMMO SCREEN Q2 YR (SYSTEM ASSIGNED)  02/01/2015     PAP SCREENING Q3 YR (SYSTEM ASSIGNED)  04/04/2016       Current Medications:     Current Outpatient Prescriptions   Medication Sig Dispense Refill     Ferrous Sulfate 324 (65 FE) MG TBEC TAKE 1 TABLET BY MOUTH 3 TIMES DAILY WITH MEALS. TAKE WITH A SMALL AMOUNT OF ORANGE JUICE. DO NOT TAKE WITH CALCIUM. 90 tablet 1     iohexol (OMNIPAQUE) 140 MG/ML SOLN solution Mix entire bottle (50ml) of contast with 600ml (20 ounces) of water and drink half 2 hrs prior to CT scan and half 1 hr prior to scan 140 mL 0     LORazepam (ATIVAN) 1 MG tablet Take 1 tablet (1 mg) by mouth  every 6 hours as needed (Anxiety, Nausea/Vomiting or Sleep) 30 tablet 2     LEVOTHYROXINE SODIUM PO Take by mouth daily        order for DME Injection Supplies for Vitamin B12: 3cc syringes w/ 27 gauge needles, 1/2 inch length 3 each 0     METFORMIN HCL PO Take 500 mg by mouth daily       diphenoxylate-atropine (LOMOTIL) 2.5-0.025 MG per tablet Take 2 tablets by mouth 4 times daily as needed for diarrhea 60 tablet 0     cyanocobalamin (VITAMIN B12) 1000 MCG/ML injection Inject 1 mL (1,000 mcg) into the muscle every 30 days 1 mL 11     ASPIRIN PO Take 325 mg by mouth daily       potassium chloride (KLOR-CON) 20 MEQ packet Take 20 mEq by mouth daily       VITAMIN E NATURAL PO Take 100 Units by mouth daily       Cholecalciferol (VITAMIN D PO) Take 1,000 Units by mouth daily Unknown dose       HERBALS daily        Ascorbic Acid (VITAMIN C PO) Take 500 mg by mouth daily        Calcium Carbonate-Vitamin D (CALCIUM + D PO) Take 1 tablet by mouth daily.       prochlorperazine (COMPAZINE) 10 MG tablet Take 1 tablet (10 mg) by mouth every 6 hours as needed (nausea/vomiting) (Patient not taking: Reported on 12/14/2017) 30 tablet 2     magnesium oxide (MAG-OX) 400 MG tablet Take 1 tablet (400 mg) by mouth 2 times daily (Patient not taking: Reported on 3/5/2018) 90 tablet 3         Allergies:      No Known Allergies     Social History:     Social History   Substance Use Topics     Smoking status: Former Smoker     Years: 8.00     Types: Cigarettes     Quit date: 6/21/1980     Smokeless tobacco: Never Used      Comment: started at 11 yo and quit at 18 yo     Alcohol use Yes      Comment: 3x/day wine or jodi       History   Drug Use No         Family History:       Family History   Problem Relation Age of Onset     CANCER Mother 69     lung, smoker     CANCER Maternal Uncle 65     brain     Colon Cancer Maternal Aunt 80     colon         Physical Exam:     /58  Pulse 105  Temp 98.2  F (36.8  C) (Oral)  Resp 17  Ht  "1.651 m (5' 5\")  Wt 63.6 kg (140 lb 3.2 oz)  SpO2 100%  Breastfeeding? No  BMI 23.33 kg/m2  Body mass index is 23.33 kg/(m^2).    General Appearance: healthy and alert, no distress     Musculoskeletal: extremities non tender and without edema    Neurological: normal gait, no gross defects     Psychiatric: appropriate mood and affect                 Assessment:    Odalys Nathan is a 57 year old woman with a diagnosis of recurrent platinum sensitive ovarian cancer.     A total of 40minutes was spent with the patient, 35 minutes of which were spent in counseling the patient and/or treatment planning.    1.  Recurrent platinum-sensitive ovarian cancer.   2.  Positive treatment response with  of 49.     3.  Positive radiologic treatment response.        I had a long discussion with the patient as well as with her .   has declined to 49.  This is a positive treatment response.  She still has a couple of nodules in the lungs, which are stable and has overall a positive response to treatment.  The patient feels very fatigued.  The last couple of cycles have taken quite a toll on her.  We discussed 3 options:  Either do a treatment break for 3 months, versus continue with single-agent Avastin, versus olaparib PARP inhibitor based on recent platinum response, of note she has been prior on a niraparib trial, which was stopped due to treatment protocol.  She would like to proceed with a treatment break for 3 months.  She will see Dr. Yeung for that with another  as well as a CT scan at that time.  They are very appreciative of her care.  All questions were answered.  They agree with the plan.       Garry Riggins MD, MS    Department of Obstetrics and Gynecology   Division of Gynecologic Oncology   [facility]  Phone: [phone]        CC  Patient Care Team:  Aubrie Hester as PCP - General  Tammy Flores RN as Continuity Care Coordinator " (Oncology)  Nga Yeung MD as MD (Oncology)  Patricia Rocha APRN CNP as Nurse Practitioner (Nurse Practitioner)  BETTY ALMANZA

## 2018-03-15 DIAGNOSIS — C56.1 OVARIAN CANCER, RIGHT (H): Primary | ICD-10-CM

## 2018-04-26 ENCOUNTER — ALLIED HEALTH/NURSE VISIT (OUTPATIENT)
Dept: INFUSION THERAPY | Facility: CLINIC | Age: 58
End: 2018-04-26
Attending: OBSTETRICS & GYNECOLOGY
Payer: COMMERCIAL

## 2018-04-26 DIAGNOSIS — E83.42 HYPOMAGNESEMIA: ICD-10-CM

## 2018-04-26 DIAGNOSIS — C56.1 OVARIAN CANCER, RIGHT (H): Primary | ICD-10-CM

## 2018-04-26 DIAGNOSIS — C56.2 OVARIAN CANCER, LEFT (H): ICD-10-CM

## 2018-04-26 PROCEDURE — 25000128 H RX IP 250 OP 636: Performed by: NURSE PRACTITIONER

## 2018-04-26 PROCEDURE — 96523 IRRIG DRUG DELIVERY DEVICE: CPT

## 2018-04-26 RX ORDER — HEPARIN SODIUM (PORCINE) LOCK FLUSH IV SOLN 100 UNIT/ML 100 UNIT/ML
500 SOLUTION INTRAVENOUS DAILY PRN
Status: CANCELLED
Start: 2018-04-26

## 2018-04-26 RX ORDER — HEPARIN SODIUM (PORCINE) LOCK FLUSH IV SOLN 100 UNIT/ML 100 UNIT/ML
500 SOLUTION INTRAVENOUS DAILY PRN
Status: DISCONTINUED | OUTPATIENT
Start: 2018-04-26 | End: 2018-04-26 | Stop reason: HOSPADM

## 2018-04-26 RX ADMIN — HEPARIN 500 UNITS: 100 SYRINGE at 15:44

## 2018-04-26 NOTE — PROGRESS NOTES
Infusion Nursing Note:  Odalys Osmar Nathan presents today for port flush.    Patient seen by provider today: No   present during visit today: Not Applicable.    Note: N/A.    Intravenous Access:  Implanted Port.    Treatment Conditions:  Not Applicable.      Post Infusion Assessment:  Blood return noted pre and post infusion.  Site patent and intact, free from redness, edema or discomfort.  Access discontinued per protocol.    Discharge Plan:   Patient and/or family verbalized understanding of discharge instructions and all questions answered.  AVS to patient via [facility].  Patient will return June for Labs for next appointment.   Patient discharged in stable condition accompanied by: self.  Departure Mode: Ambulatory.    Brenda Manzano RN

## 2018-04-26 NOTE — MR AVS SNAPSHOT
After Visit Summary   4/26/2018    Odalys Nathan    MRN: [id number]           Patient Information     Date Of Birth          1960        Visit Information        Provider Department      4/26/2018 3:30 PM RH LAB DRAW 1 [facility]        Today's Diagnoses     Ovarian cancer, right (H)    -  1    Ovarian cancer, left (H)        Hypomagnesemia           Follow-ups after your visit        Your next 10 appointments already scheduled     Jun 04, 2018 12:00 PM CDT   [facility] Draw with  [facility] DRAW   [facility] Draw ([facility])    [address]  [address]  [place] [address] [phone]            Jun 04, 2018 12:40 PM CDT   (Arrive by 12:25 PM)   CT CHEST/ABDOMEN/PELVIS W CONTRAST with UCCT2   [facility] CT ([facility])    [address]  [address]st [address]  [place] [id number] [phone]           Please bring any scans or X-rays taken at other hospitals, if similar tests were done. Also bring a list of your medicines, including vitamins, minerals and over-the-counter drugs. It is safest to leave personal items at home.  Be sure to tell your doctor:   If you have any allergies.   If there s any chance you are pregnant.   If you are breastfeeding.  How to prepare:   Do not eat or drink for 2 hours before your exam. If you need to take medicine, you may take it with small sips of water. (We may ask you to take liquid medicine as well.)   Please wear loose clothing, such as a sweat suit or jogging clothes. Avoid snaps, zippers and other metal. We may ask you to undress and put on a hospital gown.  Please arrive 30 minutes early for your CT. Once in the department you might be asked to drink water 15-20 minutes prior to your exam.  If indicated you may be asked to drink an oral contrast in advance of your CT.  If this is the case, the imaging team will let you  know or be in contact with you prior to your appointment  Patients over 70 or patients with diabetes or kidney problems:   If you haven t had a blood test (creatinine test) within the last 30 days, the Cardiologist/Radiologist may require you to get this test prior to your exam.  If you have diabetes:   Continue to take your metformin medication on the day of your exam  If you have any questions, please call the Imaging Department where you will have your exam.            Jun 21, 2018 10:40 AM CDT   (Arrive by 10:25 AM)   Return Visit with Nga Yeung MD   [facility] ([facility])    [address]  [address]  [place] [address] [phone]              Who to contact     If you have questions or need follow up information about today's clinic visit or your schedule please contact [facility] directly at [phone].  Normal or non-critical lab and imaging results will be communicated to you by MyChart, letter or phone within 4 business days after the clinic has received the results. If you do not hear from us within 7 days, please contact the clinic through [employer] or phone. If you have a critical or abnormal lab result, we will notify you by phone as soon as possible.  Submit refill requests through [employer] or call your pharmacy and they will forward the refill request to us. Please allow 3 business days for your refill to be completed.          Additional Information About Your Visit        [employer] Information     [employer] gives you secure access to your electronic health record. If you see a primary care provider, you can also send messages to your care team and make appointments. If you have questions, please call your primary care clinic.  If you do not have a primary care provider, please call [phone] and they will assist you.        Care EveryWhere ID     This is your Care EveryWhere ID. This could be  used by other organizations to access your [place] medical records  [id number]         Blood Pressure from Last 3 Encounters:   03/05/18 108/58   02/23/18 134/69   02/16/18 119/66    Weight from Last 3 Encounters:   03/05/18 63.6 kg (140 lb 3.2 oz)   02/09/18 64 kg (141 lb 1.6 oz)   01/26/18 63.2 kg (139 lb 7 oz)              Today, you had the following     No orders found for display       Primary Care Provider Office Phone # Fax #    Aubrie Hester [phone]       [facility] [address] [address] [facility] [address]        Equal Access to Services     SANDY CHAMBERS : Felicia Walter, wajeovanny mukherjee, qaybta kaalmada adedileep, blanka szymanski. So [facility] [phone].    ATENCIÓN: Si habla español, tiene a bell disposición servicios gratuitos de asistencia lingüística. Llame al [phone].    We comply with applicable federal civil rights laws and [place] laws. We do not discriminate on the basis of race, color, national origin, age, disability, sex, sexual orientation, or gender identity.            Thank you!     Thank you for choosing [facility]  for your care. Our goal is always to provide you with excellent care. Hearing back from our patients is one way we can continue to improve our services. Please take a few minutes to complete the written survey that you may receive in the mail after your visit with us. Thank you!             Your Updated Medication List - Protect others around you: Learn how to safely use, store and throw away your medicines at www.disposemymeds.org.          This list is accurate as of 4/26/18  3:50 PM.  Always use your most recent med list.                   Brand Name Dispense Instructions for use Diagnosis    ASPIRIN PO      Take 325 mg by mouth daily        CALCIUM + D PO      Take 1 tablet by mouth daily.    Pelvic mass       cyanocobalamin 1000 MCG/ML injection     VITAMIN B12    1 mL    Inject 1 mL (1,000 mcg) into the muscle every 30 days    B12 deficiency       diphenoxylate-atropine 2.5-0.025 MG per tablet    LOMOTIL    60 tablet    Take 2 tablets by mouth 4 times daily as needed for diarrhea    Acute diarrhea       Ferrous Sulfate 324 (65 Fe) MG Tbec     90 tablet    TAKE 1 TABLET BY MOUTH 3 TIMES DAILY WITH MEALS. TAKE WITH A SMALL AMOUNT OF ORANGE JUICE. DO NOT TAKE WITH CALCIUM.    Ovarian cancer, right (H), Anemia, unspecified type, Ovarian cancer, left (H)       HERBALS      daily        iohexol 140 MG/ML Soln solution    OMNIPAQUE    140 mL    Mix entire bottle (50ml) of contast with 600ml (20 ounces) of water and drink half 2 hrs prior to CT scan and half 1 hr prior to scan    Ovarian cancer, right (H), Metastatic cancer (H)       LEVOTHYROXINE SODIUM PO      Take by mouth daily        LORazepam 1 MG tablet    ATIVAN    30 tablet    Take 1 tablet (1 mg) by mouth every 6 hours as needed (Anxiety, Nausea/Vomiting or Sleep)    Ovarian cancer, unspecified laterality (H)       magnesium oxide 400 MG tablet    MAG-OX    90 tablet    Take 1 tablet (400 mg) by mouth 2 times daily    Ovarian cancer, unspecified laterality (H)       METFORMIN HCL PO      Take 500 mg by mouth daily        order for DME     3 each    Injection Supplies for Vitamin B12: 3cc syringes w/ 27 gauge needles, 1/2 inch length    B12 deficiency       potassium chloride 20 MEQ Packet    KLOR-CON     Take 20 mEq by mouth daily        prochlorperazine 10 MG tablet    COMPAZINE    30 tablet    Take 1 tablet (10 mg) by mouth every 6 hours as needed (nausea/vomiting)    Encounter for long-term (current) use of medications, Ovarian cancer, right (H), Ovarian cancer, left (H), Drug-induced neutropenia (H)       VITAMIN C PO      Take 500 mg by mouth daily    Pelvic mass       VITAMIN D PO      Take 1,000 Units by mouth daily Unknown dose        VITAMIN E NATURAL PO      Take 100 Units by mouth daily

## 2018-05-02 DIAGNOSIS — D64.9 ANEMIA, UNSPECIFIED TYPE: ICD-10-CM

## 2018-05-02 DIAGNOSIS — C56.1 OVARIAN CANCER, RIGHT (H): ICD-10-CM

## 2018-05-02 DIAGNOSIS — C56.2 OVARIAN CANCER, LEFT (H): ICD-10-CM

## 2018-05-02 RX ORDER — FERROUS SULFATE 324(65)MG
324 TABLET, DELAYED RELEASE (ENTERIC COATED) ORAL
Qty: 90 TABLET | Refills: 1 | Status: SHIPPED | OUTPATIENT
Start: 2018-05-02 | End: 2018-07-13

## 2018-05-02 NOTE — TELEPHONE ENCOUNTER
Pharmacy/patient calling for refill of iron for the patient  Last visit with MD 3/5/18  Last order date    Ferrous Sulfate 324 (65 Fe) MG TBEC 90 tablet 1 5/2/2018  No   Sig: Take 324 mg by mouth 3 times daily (with meals) Take with a small amount of orange juice. Do not take with calcium       RX reviewed with NP and approved  RX sent to pharmacy

## 2018-05-21 DIAGNOSIS — Z85.43 PERSONAL HISTORY OF OVARIAN CANCER: Primary | ICD-10-CM

## 2018-05-31 DIAGNOSIS — E83.42 HYPOMAGNESEMIA: ICD-10-CM

## 2018-05-31 DIAGNOSIS — C56.2 OVARIAN CANCER, LEFT (H): ICD-10-CM

## 2018-05-31 DIAGNOSIS — C56.1 OVARIAN CANCER, RIGHT (H): Primary | ICD-10-CM

## 2018-05-31 PROCEDURE — 96523 IRRIG DRUG DELIVERY DEVICE: CPT

## 2018-05-31 PROCEDURE — 25000128 H RX IP 250 OP 636: Performed by: NURSE PRACTITIONER

## 2018-05-31 RX ORDER — HEPARIN SODIUM (PORCINE) LOCK FLUSH IV SOLN 100 UNIT/ML 100 UNIT/ML
500 SOLUTION INTRAVENOUS DAILY PRN
Status: CANCELLED
Start: 2018-05-31

## 2018-05-31 RX ORDER — HEPARIN SODIUM (PORCINE) LOCK FLUSH IV SOLN 100 UNIT/ML 100 UNIT/ML
500 SOLUTION INTRAVENOUS DAILY PRN
Status: DISCONTINUED | OUTPATIENT
Start: 2018-05-31 | End: 2018-05-31 | Stop reason: HOSPADM

## 2018-05-31 RX ADMIN — HEPARIN 500 UNITS: 100 SYRINGE at 14:05

## 2018-05-31 NOTE — PROGRESS NOTES
Infusion Nursing Note:  Odalyssherine Nathan presents today for port flush.    Patient seen by provider today: No   present during visit today: Not Applicable.    Note: N/A.    Intravenous Access:  Implanted Port.    Treatment Conditions:  Not Applicable.      Post Infusion Assessment:  Blood return noted pre and post infusion.  Site patent and intact, free from redness, edema or discomfort.  Access discontinued per protocol.    Discharge Plan:   Discharge instructions reviewed with: Patient.  Patient and/or family verbalized understanding of discharge instructions and all questions answered.  AVS to patient via [employer].  Patient will return 6/20 for next appointment.   Patient discharged in stable condition accompanied by: self.  Departure Mode: Ambulatory.    Brenda Manzano RN

## 2018-06-20 ENCOUNTER — RADIANT APPOINTMENT (OUTPATIENT)
Dept: CT IMAGING | Facility: CLINIC | Age: 58
End: 2018-06-20
Attending: OBSTETRICS & GYNECOLOGY
Payer: COMMERCIAL

## 2018-06-20 DIAGNOSIS — C79.9 METASTATIC CANCER (H): Primary | ICD-10-CM

## 2018-06-20 DIAGNOSIS — C56.1 OVARIAN CANCER, RIGHT (H): ICD-10-CM

## 2018-06-20 LAB — CANCER AG125 SERPL-ACNC: 170 U/ML (ref 0–30)

## 2018-06-20 PROCEDURE — 86304 IMMUNOASSAY TUMOR CA 125: CPT | Performed by: OBSTETRICS & GYNECOLOGY

## 2018-06-20 PROCEDURE — 25000128 H RX IP 250 OP 636: Performed by: OBSTETRICS & GYNECOLOGY

## 2018-06-20 RX ORDER — HEPARIN SODIUM (PORCINE) LOCK FLUSH IV SOLN 100 UNIT/ML 100 UNIT/ML
5 SOLUTION INTRAVENOUS ONCE
Status: COMPLETED | OUTPATIENT
Start: 2018-06-20 | End: 2018-06-20

## 2018-06-20 RX ORDER — HEPARIN SODIUM (PORCINE) LOCK FLUSH IV SOLN 100 UNIT/ML 100 UNIT/ML
500 SOLUTION INTRAVENOUS ONCE
Status: COMPLETED | OUTPATIENT
Start: 2018-06-20 | End: 2018-06-20

## 2018-06-20 RX ORDER — IOPAMIDOL 755 MG/ML
86 INJECTION, SOLUTION INTRAVASCULAR ONCE
Status: COMPLETED | OUTPATIENT
Start: 2018-06-20 | End: 2018-06-20

## 2018-06-20 RX ADMIN — SODIUM CHLORIDE, PRESERVATIVE FREE 5 ML: 5 INJECTION INTRAVENOUS at 10:27

## 2018-06-20 RX ADMIN — IOPAMIDOL 86 ML: 755 INJECTION, SOLUTION INTRAVASCULAR at 11:09

## 2018-06-20 RX ADMIN — HEPARIN SODIUM (PORCINE) LOCK FLUSH IV SOLN 100 UNIT/ML 500 UNITS: 100 SOLUTION at 11:16

## 2018-06-20 ASSESSMENT — ENCOUNTER SYMPTOMS
SINUS PAIN: 0
VOMITING: 0
SMELL DISTURBANCE: 0
ABDOMINAL PAIN: 0
HEARTBURN: 0
NECK MASS: 1
RECTAL PAIN: 0
BLOATING: 0
TASTE DISTURBANCE: 0
HOARSE VOICE: 0
CONSTIPATION: 0
BLOOD IN STOOL: 0
SORE THROAT: 0
NAUSEA: 0
BOWEL INCONTINENCE: 0
JAUNDICE: 0
TROUBLE SWALLOWING: 0
DIARRHEA: 1
SINUS CONGESTION: 0

## 2018-06-20 NOTE — DISCHARGE INSTRUCTIONS

## 2018-06-20 NOTE — NURSING NOTE
"Chief Complaint   Patient presents with     Port Draw     Labs drawn from port by RN. Line flushed with saline and heparin.     Port accessed with 20g 3/4\" flat needle by RN, labs collected, line flushed with saline and heparin.    Erma Gil RN  "

## 2018-06-21 ENCOUNTER — ONCOLOGY VISIT (OUTPATIENT)
Dept: ONCOLOGY | Facility: CLINIC | Age: 58
End: 2018-06-21
Attending: OBSTETRICS & GYNECOLOGY
Payer: COMMERCIAL

## 2018-06-21 ENCOUNTER — CARE COORDINATION (OUTPATIENT)
Dept: ONCOLOGY | Facility: CLINIC | Age: 58
End: 2018-06-21

## 2018-06-21 VITALS
WEIGHT: 141.6 LBS | DIASTOLIC BLOOD PRESSURE: 85 MMHG | RESPIRATION RATE: 16 BRPM | OXYGEN SATURATION: 97 % | HEART RATE: 87 BPM | TEMPERATURE: 97.5 F | SYSTOLIC BLOOD PRESSURE: 129 MMHG | BODY MASS INDEX: 23.56 KG/M2

## 2018-06-21 DIAGNOSIS — C79.9 METASTATIC CANCER (H): Primary | ICD-10-CM

## 2018-06-21 DIAGNOSIS — C56.2 OVARIAN CANCER, LEFT (H): ICD-10-CM

## 2018-06-21 DIAGNOSIS — C79.9 METASTATIC CANCER (H): ICD-10-CM

## 2018-06-21 DIAGNOSIS — K83.8 INTRAHEPATIC BILE DUCT DILATION: ICD-10-CM

## 2018-06-21 DIAGNOSIS — C56.1 OVARIAN CANCER, RIGHT (H): ICD-10-CM

## 2018-06-21 LAB
ALBUMIN SERPL-MCNC: 3.8 G/DL (ref 3.4–5)
ALP SERPL-CCNC: 163 U/L (ref 40–150)
ALT SERPL W P-5'-P-CCNC: 33 U/L (ref 0–50)
ANION GAP SERPL CALCULATED.3IONS-SCNC: 8 MMOL/L (ref 3–14)
AST SERPL W P-5'-P-CCNC: 25 U/L (ref 0–45)
BASOPHILS # BLD AUTO: 0 10E9/L (ref 0–0.2)
BASOPHILS NFR BLD AUTO: 0.3 %
BILIRUB SERPL-MCNC: 0.5 MG/DL (ref 0.2–1.3)
BUN SERPL-MCNC: 14 MG/DL (ref 7–30)
CALCIUM SERPL-MCNC: 9.5 MG/DL (ref 8.5–10.1)
CHLORIDE SERPL-SCNC: 101 MMOL/L (ref 94–109)
CO2 SERPL-SCNC: 25 MMOL/L (ref 20–32)
CREAT SERPL-MCNC: 0.85 MG/DL (ref 0.52–1.04)
DIFFERENTIAL METHOD BLD: ABNORMAL
EOSINOPHIL # BLD AUTO: 0.1 10E9/L (ref 0–0.7)
EOSINOPHIL NFR BLD AUTO: 1.9 %
ERYTHROCYTE [DISTWIDTH] IN BLOOD BY AUTOMATED COUNT: 12.6 % (ref 10–15)
GFR SERPL CREATININE-BSD FRML MDRD: 68 ML/MIN/1.7M2
GLUCOSE SERPL-MCNC: 110 MG/DL (ref 70–99)
HCT VFR BLD AUTO: 40.6 % (ref 35–47)
HGB BLD-MCNC: 13.5 G/DL (ref 11.7–15.7)
IMM GRANULOCYTES # BLD: 0 10E9/L (ref 0–0.4)
IMM GRANULOCYTES NFR BLD: 0.3 %
LYMPHOCYTES # BLD AUTO: 2 10E9/L (ref 0.8–5.3)
LYMPHOCYTES NFR BLD AUTO: 30.7 %
MAGNESIUM SERPL-MCNC: 1.3 MG/DL (ref 1.6–2.3)
MCH RBC QN AUTO: 34.5 PG (ref 26.5–33)
MCHC RBC AUTO-ENTMCNC: 33.3 G/DL (ref 31.5–36.5)
MCV RBC AUTO: 104 FL (ref 78–100)
MONOCYTES # BLD AUTO: 0.6 10E9/L (ref 0–1.3)
MONOCYTES NFR BLD AUTO: 9.7 %
NEUTROPHILS # BLD AUTO: 3.7 10E9/L (ref 1.6–8.3)
NEUTROPHILS NFR BLD AUTO: 57.1 %
NRBC # BLD AUTO: 0 10*3/UL
NRBC BLD AUTO-RTO: 0 /100
PLATELET # BLD AUTO: 221 10E9/L (ref 150–450)
POTASSIUM SERPL-SCNC: 3.8 MMOL/L (ref 3.4–5.3)
PROT SERPL-MCNC: 8 G/DL (ref 6.8–8.8)
RBC # BLD AUTO: 3.91 10E12/L (ref 3.8–5.2)
SODIUM SERPL-SCNC: 135 MMOL/L (ref 133–144)
WBC # BLD AUTO: 6.4 10E9/L (ref 4–11)

## 2018-06-21 PROCEDURE — 85025 COMPLETE CBC W/AUTO DIFF WBC: CPT | Performed by: OBSTETRICS & GYNECOLOGY

## 2018-06-21 PROCEDURE — 80053 COMPREHEN METABOLIC PANEL: CPT | Performed by: OBSTETRICS & GYNECOLOGY

## 2018-06-21 PROCEDURE — 83735 ASSAY OF MAGNESIUM: CPT | Performed by: OBSTETRICS & GYNECOLOGY

## 2018-06-21 PROCEDURE — 99214 OFFICE O/P EST MOD 30 MIN: CPT | Mod: ZP | Performed by: OBSTETRICS & GYNECOLOGY

## 2018-06-21 PROCEDURE — 36415 COLL VENOUS BLD VENIPUNCTURE: CPT

## 2018-06-21 PROCEDURE — G0463 HOSPITAL OUTPT CLINIC VISIT: HCPCS | Mod: ZF

## 2018-06-21 ASSESSMENT — ENCOUNTER SYMPTOMS
DYSURIA: 0
DOUBLE VISION: 0
PARALYSIS: 0
MUSCLE WEAKNESS: 0
DECREASED LIBIDO: 0
SPEECH CHANGE: 0
ABDOMINAL PAIN: 0
TROUBLE SWALLOWING: 0
PANIC: 0
DYSPNEA ON EXERTION: 0
ORTHOPNEA: 0
TINGLING: 0
JOINT SWELLING: 0
EXTREMITY NUMBNESS: 0
NECK MASS: 0
LIGHT-HEADEDNESS: 0
EYE WATERING: 0
INCREASED ENERGY: 0
BOWEL INCONTINENCE: 0
SEIZURES: 0
SORE THROAT: 0
SPUTUM PRODUCTION: 0
WEIGHT GAIN: 0
BACK PAIN: 0
DECREASED CONCENTRATION: 0
LEG SWELLING: 0
CHILLS: 0
VOMITING: 0
NUMBNESS: 0
EYE PAIN: 0
SHORTNESS OF BREATH: 0
HEMOPTYSIS: 0
CLAUDICATION: 0
SLEEP DISTURBANCES DUE TO BREATHING: 0
DEPRESSION: 0
SWOLLEN GLANDS: 0
HYPOTENSION: 0
INSOMNIA: 0
BLOOD IN STOOL: 0
STIFFNESS: 0
WHEEZING: 0
HEADACHES: 0
BRUISES/BLEEDS EASILY: 0
FEVER: 0
SNORES LOUDLY: 0
LEG PAIN: 0
HEMATURIA: 0
SMELL DISTURBANCE: 0
POLYPHAGIA: 0
FATIGUE: 1
HEARTBURN: 0
DIARRHEA: 1
NERVOUS/ANXIOUS: 0
POSTURAL DYSPNEA: 0
PALPITATIONS: 0
RESPIRATORY PAIN: 0
CONSTIPATION: 0
DECREASED APPETITE: 0
FLANK PAIN: 0
RECTAL PAIN: 0
ALTERED TEMPERATURE REGULATION: 0
DIZZINESS: 0
MUSCLE CRAMPS: 0
SKIN CHANGES: 0
HALLUCINATIONS: 0
BREAST MASS: 0
EXERCISE INTOLERANCE: 0
EYE REDNESS: 0
NAIL CHANGES: 0
HOT FLASHES: 0
SINUS CONGESTION: 0
NIGHT SWEATS: 0
BREAST PAIN: 0
COUGH DISTURBING SLEEP: 0
POLYDIPSIA: 0
NAUSEA: 0
DISTURBANCES IN COORDINATION: 0
RECTAL BLEEDING: 0
JAUNDICE: 0
HOARSE VOICE: 0
LOSS OF CONSCIOUSNESS: 0
POOR WOUND HEALING: 0
HYPERTENSION: 0
WEAKNESS: 0
ARTHRALGIAS: 0
TASTE DISTURBANCE: 0
TACHYCARDIA: 0
BLOATING: 0
MEMORY LOSS: 0
NECK PAIN: 0
TREMORS: 0
MYALGIAS: 0
SYNCOPE: 0
SINUS PAIN: 0
COUGH: 0
EYE IRRITATION: 0
DIFFICULTY URINATING: 0
WEIGHT LOSS: 0

## 2018-06-21 ASSESSMENT — PAIN SCALES - GENERAL: PAINLEVEL: NO PAIN (0)

## 2018-06-21 NOTE — PROGRESS NOTES
Gynecologic Oncology Follow-Up Note  RE: Odalys Nathan  MRN: [id number]  : 1960  Date of Visit: 2018    CC: Odalys Nathan is a 57 year old year old female with recurrent stage IIIC bilateral ovarian cancer who presents today for disease and treatment management. She has recently completed 3 cycles of carbo/doxil followed by 3 cycles carbo/doxil + avastin for a total of 6 cycles. New CT scan worrisome for disease progression.     HPI: Odalys presents to clinic today feeling fairly fatigued. We discussed increasing  (now 170, up from 49) as well as recent CT scan at length. I am expressed my concern with her disease as well is biliary duct dilatation. No jaundice noticeable in clinic today, patient reports she has been eating and drinking well. Will check liver function today and order US of liver. Discussed possible need for stent placement in future. Also discussed possible clinic trials. At this point Odalys is platinum resistant and has had multiple lines of treatment including parp-inhibitor. NK cell trial is an option however, she would not be able to be treated until  due to trial waitlist and she will need to find donor. Other option would be weekly taxol. Will send tumor to [facility] for possible targeted therapies. Odalys is otherwise doing well. No vaginal bleeding or abnormal discharge.     Brief Oncology History:  2012 - Admitted to hospital for 2 weeks of intermittent abdominal cramping, distention, diarrhea and N/V. CT of abdomen/pelvis significant for small bowel obstruction, a heterogenous soft tissue density in the pelvis, omental nodules, and ascites. Bilateral adnexal masses per U/S of pelvis. CA-125 was elevated at 987, CEA was normal at 1.0.    12 - Therapeutic paracentesis (4 L) with cytology confirming malignancy (NJ positive, weak ER positive, CK-7 positive consistent with GYN primary). Surgery recommended d/t potential for  falling blood counts 2/2 chemotherapy (patient is Sabianist and limiting blood transfusion)    2/1/12 - Exploratory laparotomy, MAR BSO, lysis of adhesion, Appendectomy, Repair cystotomy, Omentectomy Fekk-lkitlc-sqslugzvj-transverse-colon resection, Ileo-descending colon anastomosis, CUSA, & Colonoscopy (done by Dr. Amato and colorectal team).  2/20/2012 - Admitted to hospital for bilateral pulmonary emboli and drainage of pleural effusion. Started on Lovenox.  2/28/12-3/21/12: Cycle #1-2 Carbo/Taxol IV  3/29/12 - Started on Keflex by her PCP for infection in her healing wound (immediately below her umbilicus).    4/11/12-6/14/12: Cycle #3-6 IV chemo, Cycle #1 IV/IP chemo  7/16/12 -  8, CT PRADEEP - enrolled in  - observation arm  3/4/13-6/28/13:  6, 9, 12, 15, 20.  7/1/13: CT Chest, Abdomen, Pelvis IMPRESSION:  1. Worsening metastatic ovarian carcinoma suggested by increased size of soft tissue nodules anterior to the right psoas muscle, that may represent growing mesenteric lymphadenopathy.  2. Remaining prominent right lower quadrant mesenteric lymph nodes are not significantly changed from CT 7/16/2012.  3. Clustered nodular opacities in the right lower lobe are not significant change from 7/16/2012 and remain indeterminate. Again, the appearance and distribution is suggestive of an infectious etiology.  4. Stable 8 mm soft tissue nodule in left breast, unchanged since at least 02/20/2012. This can be observed on followup studies, but correlation with mammography could be considered.  Decision made to start Doxil/Carbo.  7/11/13: The left ventricular ejection fraction is normal at 66.4%.  7/12/13-9/6/13: Cycle #1-3 Doxil/Carbo.  10, 11.    9/30/13 CT C/A/P Impression:  1. Overall, favorable response to treatment with decreasing size of soft tissue nodules tracking along the anterior aspect of the right psoas muscle.  2. 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  3. Improved  cluster of right lower lobe pulmonary nodular opacities. These may represent resolving infection.  10/3/13-12/9/13:  9, 8, 10. Cycle 4-6 Doxil/Carbo.    1/13/14 CT C/A/P Impression:   1. Stable appearance of metastatic ovarian cancer. Scattered soft tissue nodules along the anterior aspect of the right psoas muscle are unchanged in size. Mild mesenteric lymphadenopathy is unchanged.  2. Clustered micronodules in the right lower lobe are unchanged from 9/30/2013, but improved from 7/1/2013. This history suggests a postinflammatory/postinfectious etiology.  3. 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  1/16/14 Discussed multiple options for her based on relatively stable-appearing disease on CT but slight increase in  (which has had small increase to 20 with last recurrence) including chemo break with recheck of  in 1 month, starting new chemo agent immediately, and exploratory surgery with possible resection of nodules. She is considered platinum-sensitive based on > 1 year remission after Taxol/Carbo, which we will take into consideration for future chemo planning. I am not inclined to surgery at this time given difficulty she already has with diarrhea secondary to past colon resection. I suspect we would need to resect further bowel due to mesenteric disease. Also explained inherent risks of any major surgery. Also mentioned maintenance chemo, but this has not been shown to increase overall survival and would likely decrease her quality of life without significant benefit. Family is going on vacation to [place] in 2 weeks and Odalys does not want to have chemo prior to that, so will plan to take 1 month break. She can have  at that time (discussed checking toady since last draw was in early December, but as it would likely not change treatment plan and she has h/o slow rising , will not check today).  2/17/14  16    2/20/14: Decision to take break from chemo for two months,  "followed by CT and CA-125.    4/21/14  27  4/21/14 CT C/A/P Impression:    1. Increased size of 2 low-attenuation lymph nodes anterior to the right psoas muscle is concerning for worsening metastatic ovarian cancer.    2. New circumferential thickening of a 3.8 cm length segment of distal transverse colon is likely physiologic. Recommend attention on followup imaging.    3. Grossly unchanged size of clustered small nodules versus scarring in the right lower lobe the lungs.    4. Stable thrombosis of the right ovarian vein.  5/14/14: Diagnostic laparoscopy converted to exploratory laparotomy and removal of mesenteric masses, tumor debulking, peritoneal biopsies and intraperitoneal port placement. On laparoscopy, it was noted that there were small nodules on the anterior abdominal wall near the previous incision, small nodules on the right pelvic sidewall as well. Nodules were palpated in the mesentery; however, as it was unable to clarify where the origin of the nodules was, the decision was made to open the patient. On opening there was found to be approximately a 3 cm nodule in the small bowel mesentery and another separate approximately 2 cm nodule in the bowel mesentery. Pelvis without evidence of cancer, some mesenteric lymph nodes were palpated. No evidence otherwise of any disseminated cancer throughout the abdomen.    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 (2/2)  -Largest metastasis: 1.3 cm  -See comment  D: Peritoneum, right paracolic gutter #2, biopsy:  -Fibroadipose tissue with granulomatous inflammation surrounding refractile material  -Negative for " "malignancy   E: Small bowel adhesion, biopsy:  -Fibroconnective tissue, consistent with adhesion  -Negative for malignancy  F: Lymph nodes, mesentry, not otherwise specified, excision:  -Two lymph nodes, negative for metastatic carcinoma (0/2)  G: Lymph node, mesentery, \"#2\", excision:  -One lymph node, negative for metastatic carcinoma (0/1)  H: Lymph nodes, mesentery, \"nodule #2\", excision:  -Five lymph nodes, negative for metastatic carcinoma (0/5)  COMMENT:  Some of the specimens show post-operative changes. Others show possible treatment related changes, including necrosis. The metastatic carcinoma in the mesenteric lymph nodes (specimen C) shows variable morphology, including relatively low grade tumor with papillary architecture, and high grade tumor comprised of nests of tumor cells with irregular, slit-like spaces and marked nuclear pleomorphism.    5/29/14: Cycle 1 IV PACLitaxel / IP CISplatin / IP PACLitaxel.  - 28.  6/26/14: Cycle #2 IV/IP.  10  8/5/14: CT chest/abd/pelvis IMPRESSION     1. In this patient with ovarian cancer, overall findings are indicative of stable/slight improvement, as multiple mesenteric lymphadenopathy and scattered nodular peritoneal soft tissue mass lesions appear unchanged or slightly smaller since 4/21/2014.    2. 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 Mild dilatation of the second and the third portion of the duodenum with a narrow SMA angle. This could represent SMA syndrome, if clinically correlated.17/14:    Cycle #3 IV/IP.  10.    CT chest/abd/pelvis with contrast on 8/5/14    Impression:    1. In this patient with ovarian cancer, overall findings are indicative of stable/slight improvement, as multiple mesenteric lymphadenopathy and scattered nodular peritoneal soft tissue mass lesions appear unchanged or slightly smaller since 4/21/2014.    2. 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 Mild dilatation of the " second and the third portion of the duodenum with a narrow SMA angle. This could represent SMA syndrome, if clinically correlated.  8/7/14: Cycle #4 Taxol/Carbo (changed from IV/IP).  10. She has been feeling okay. She is unsure if she can finish out the course of 6 cycles IV/IP taxol/cisplatin. She feels like she has the flu for about a week then starts feeling gradually better after each chemo cycle. Her spouse notes that she actually has been more sick with the treatments than she initially admits here. She was also previously having some rib pain. Denies any rib pain now. Denies any chest pain or shortness of breath.  Plan: discussed recent CT cap results and switching to just IV as she is feeling miserable with IP treatments. Switch to IV carbo/taxol as patient is platinum sensitive.  We also discussed her taking part of the tesaro trial, which would require BRCA testing. She would like to take part in this trial if eligible.  8/20/14: Remove Intraperitoneal Port ( Port and catheter intact - discarded)  8/28/14: Cycle #5 Taxol/Carbo held due to thrombocytopenia.  6.    She denies any vaginal bleeding, no changes in her bowel or bladder habits, no nausea/emesis, no lower extremity edema, and no difficulties eating or sleeping. She denies any abdominal discomfort/bloating, no fevers or chills, and no chest pain or shortness of breath. She states her diarrhea is the same. She reports some fatigue which improves about 1-2 weeks after her chemotherapy. She states she does not need any medication refills and she was told she does not meet the criteria for the TESARO trial. She states she has 3 bags of iv fluids left over from her previous chemotherapy and will give these to herself. She states she is ready for her treatment today.    9/29/14: Cycle #6 Taxol/Carbo  6. Insurance questions regarding GSF coverage today. No concerns other than fatigue. Taking iron for anemia and does not desire blood  transfusion. Using neulasta for neutropenia. Using home IV hydration if needed. Baseline unchanged. No abdominal bloating, constipation, diarrhea, pain, vaginal or rectal bleeding, cough or dyspnea, fluid retention.    10/16/14: Impression:    1. Nodular peritoneal soft tissue mass in the right lower quadrant adjacent to the psoas muscle is no longer appreciated. Adjacent prominent lymphadenopathy is unchanged from previous exam. No new peritoneal lesions.    2. 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    10/20/14:  5. CT chest/abdomen/pelvis on 10/16/14 showed nodular peritoneal soft tissue mass in the right lower quadrant adjacent to the psoas muscle is no longer appreciated. Adjacent prominent lymphadenopathy is unchanged from previous exam. 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  1/27/15:  6.  4/28/15:  14.  5/26/15:  18.  6/2/15: CT cap Impression:  1. Postsurgical changes of hysterectomy and bilateral salpingo-oophorectomy for ovarian cancer. There is a new 8 mm hazy, ill-defined hypoattenuating lesion in hepatic segment 6 which is suspicious for a metastatic deposit. Further evaluation with ultrasound in recommended.    2. Increased size of a left retroperitoneal lymph node which is indeterminate but may represent a ciro metastasis. Mildly prominent lymph nodes in the right lower quadrant are not significantly changed.  3. Moderate colonic stool burden.    6/4/15: US abdomen IMPRESSION:    Hyperechoic lesion in the right hepatic lobe, consistent with hemangioma. This does not corresponds to the area of the lesion seen on CT from 6/2/2015. An MR would be helpful for identifying and characterizing the lesion from the recent CT.  6/12/15: MR abd IMPRESSION:  1. New 20 x 11 mm enhancing lesions between the right obliques, concerning for metastatic disease. This lesions should be amenable to percutaneous biopsy, if  indicated.  2. Correlating to the lesion visualized on comparison CT is a hepatic segment 6 subcapsular 7 mm lesion. Overall the appearance favors the diagnosis of a simple cyst. However, there is faint suggestion of mild peripheral arterial enhancement. Although this is favored as  artifactual, this should be followed up to confirm stability. Recommend 6 month followup.  3. Hepatic segment 6, 5 mm lesion too small to technically characterize. Differential would favor FNH, less likely flash filling hemangioma. Recommend attention on followup.  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     9/1/15: Cycle #1 Avastin/Cytoxan.   31.     9/24/15:feeling generally well. She says she has been having back and stomach spasms. She is taking cytosine daily (she ran out yesterday). She also says its affecting her voice. Admits that it burns occasionally. She is eating and drinking normal. She also admit diarrhea, 5-7 times daily, lose/watery. She trying to stay hydrated and eat fiber. She also says that her body is sore, especially the bottom of her feet. Her blood pressure is normal. She also admits having headache after her first infusion.      9/24/15: Cycle #2 Avastin/Cytoxan.  19.  10/15/15: Cycle #3 Avastin/Cytoxan.  16.     11/6/15: CT c/a/p IMPRESSION:    1. Stable postoperative change of MAR/BSO for ovarian cancer.  2. The lesion in the right flank abdominal musculature is slightly decreased in size. Otherwise, stable examination.  2. No evidence of metastatic disease in the chest.    11/20/15: Treatment planning visit,  16    11/25/15: surgical pathology report  FINAL DIAGNOSIS:  Soft tissue, right oblique muscle mass, excision:  -Recurrent ovarian serous carcinoma  -Carcinoma is present less than 1 mm from one resection margin  -Background skeletal muscle and  fibroadipose tissue    1/4/16:  22  1/11/16-1/27/16: Radiation to right flank x 12 treatments  4/7/2016:  94. CT cap IMPRESSION:    In this patient with ovarian cancer status post MAR/BSO and descending/transverse colectomy:  1. No evidence for malignancy in the chest, abdomen, or pelvis.  2. Stable small hypodense segment 6 liver lesion, appears more likely benign, possibly a cyst.  5/13/16:  124.  6/3/16: PET CT IMPRESSION: In this patient with a history of ovarian cancer:  1. Hypermetabolic and enlarging periaortic and perihepatic lymphadenopathy compatible with metastatic disease, as detailed above.  2. Although hypodense lesion in hepatic segment 6 has been present since 6/2/2015 associated hypermetabolism makes this lesion highly concerning for metastatic disease.    Plan: to start Niraparib under TESARO study.     6/9/16:  137.  6/14/16: Cycle #1 Niraparib.    6/28/16: Cycle #1 D15 Niraparib.    7/5/16:  100.    7/11/16: Cycle #2 Niraparib.   83.    8/3/2016: PET CT IMPRESSION:    In this patient with known history of ovarian cancer:  1) New pleural based nodular opacities in the lateral and inferior aspects of the bilateral lower lobes, worse in the left lung. Likely infection. Close follow up is recommended.      2) Slight decrease in hypermetabolic abdominal lymphadenopathy. 2 hypermetabolic lymph nodes persist.  3) Unchanged right hepatic lobe metastatic lesion.     8/9/16: Cycle #3 Niraparib.  69.  9/6/16: Cycle #4 Niraparib.  53. Dose held due to anemia.  9/13/16: Eval for potential cycle 4 niraparib. Dose held due to anemia.  10/4/16: CT CAP impression:  IMPRESSION: In this patient with a known history of ovarian cancer:  1. There has been interval resolution of pleural-based nodular  opacities which likely represented infection.  2. Abdominal lymphadenopathy in the form of 2 portacaval lymph nodes  have not significantly changed in size, noted to be  hypermetabolic on  prior PET/CT.  3. Previously demonstrated metastatic lesion in the right lobe of the  liver is not significantly changed.  10/11/16:  60  11/1/16: C6 niraparib,  75  11/29/16: C7 niraparib.  78. CT CAP impression as follows:  Target lesions (RECIST criteria):       A previously described target lesion superior to the head of the  pancreas (series 2, image 64)  (referred to as a perihepatic node on  6/3/2016) may not be a valid target lesion because it measured less  than 1.5 cm originally. However, this particular node has decreased in  size, now measuring 7 mm in short axis versus 14 mm on 6/3/2016 when  measured in a similar fashion.       2.3 cm short axis portacaval lymph node on series 2 image 67,  previously 2.0 cm on 10/4/2016.       1.2 cm subtle hypodensity in hepatic segment 6 on series 2 image 75,  stable on multiple studies since at least 6/3/2016     Sum of diameters today: 3.5 cm. Sum of diameters 10/4/2016: 3.2 cm.  Growth = 9%.    12/27/16: C8 niraparib.  105.  1/25/17: C9 niraparib.  108.  2/23/17: C10 niraparib.  132.   CT CAP impression:  Sum of target lesion diameters today: 3.7 cm. Sum of target lesion  diameters on 11/28/2016: 3.5 cm. Growth= 6%  1. In this patient with history of ovarian cancer there is stable  disease by RECIST criteria as evidenced by:   1a. Mildly increased size of liver metastasis.  1b. Stable portacaval lymphadenopathy.  1c. No evidence of metastatic disease in the chest.  2. Trace emphysematous changes of the lungs.  3/22/17: C11 niraparib.  132.  4/19/17: C12 niraparib.  127.  5/16/17: CT CAP IMPRESSION: In this patient with ovarian cancer:  1. Mildly increased size of hepatic metastasis segment 6 with subtle increased capsular retraction.  2. Stable edmundo hepatis nodes, with mild increase in size of periaortic lymph nodes.  3. Prominent left supraclavicular lymph node with subtle increase in size  compared to prior studies, particularly comparing to 10/4/2016.  4. Mild subtle groundglass opacities in the right upper lobe, not present on prior study, lesser extent in the right lower lobe.  Findings may represent infection, additional consideration is malignancy (less likely), and attention on follow-up study  Recommended.  Addendum:   Prominent left supraclavicular lymph node (3/25) is stable from most recent CT performed 2/20/2017, currently measuring 14 x 16 mm, previously 14 x 16 mm on 2/20/2017.      The portal caval lymph node/edmundo hepatis lymph node is stable from 2/20/2017, measuring 21 mm.      Clarification of size of the para-aortic lymph node (series 3 image 327). It short axis measurement is 11 mm versus 9 mm on prior study.      Hepatic segment 6 triangular-shaped low density lesion (3/362) measures 14 mm, previously measured 12 mm, demonstrating a  possible/questionable minimal subtle increase in size. Similarly the lymph nodes noted above in the supraclavicular and para-aortic regions demonstrate possible minimal possible subtle increase in size.      5/18/17: Cycle #13 Niraparib.  191.  6/15/17: Cycle #14 Niraparib.  146.  7/13/17: Cycle #15 Niraparib 200 mg.  171     CT (8/9/17):       IMPRESSION:  1. Segment 6 hepatic metastasis is stable to minimally increased in  size.  2. Slight increase in multicentric adenopathy, most pronounced at the  edmundo hepatis. Additional sites include the inferior left  neck/supraclavicular region and retroperitoneum which appear stable to  minimally increased.      8/10/17:  175  9/14/17: MUGA LVEF 54%  9/22/17: C1D1 carboplatin/Doxil.  187.  10/19/17: C2D1 carboplatin/Doxil.  108.  11/17/17: C3D1 carboplatin/Doxil.  82.  12/14/17: C4D1 carboplatin/Doxil/avastin.  83.  1/12/18: C5D1 carboplatin/Doxil/avastin.  80.  2/9/18: C6D1 carboplatin/Doxil/avastin.  pending.    6/20/2018: CT CAP:  IMPRESSION: In  this patient with history of ovarian cancer:  1. Increased size of a right hepatic lobe lesion and numerous edmundo  hepatis and retroperitoneal lymph nodes compatible with progression of  metastatic disease.  2. New mild intrahepatic biliary ductal dilatation, periportal edema,  and pericholecystic fluid. Increased soft tissue fullness in the edmundo  hepatis in the expected location of the common hepatic duct. Findings  are suspicious for developing biliary ductal obstruction secondary to  worsening metastatic disease in the edmundo hepatis. Correlation with  liver function tests is recommended. Right upper quadrant ultrasound  may be beneficial.  3.. Unchanged left supraclavicular and right hilar lymphadenopathy in  the chest.        Date Value Ref Range Status   06/20/2018 170 (H) 0 - 30 U/mL Final     Comment:     Assay Method:  Chemiluminescence using Siemens Centaur XP   03/02/2018 49 (H) 0 - 30 U/mL Final     Comment:     Assay Method:  Chemiluminescence using Siemens Centaur XP   02/09/2018 71 (H) 0 - 30 U/mL Final     Comment:     Assay Method:  Chemiluminescence using Siemens Centaur XP   01/12/2018 80 (H) 0 - 30 U/mL Final     Comment:     Assay Method:  Chemiluminescence using Siemens Centaur XP   12/12/2017 83 (H) 0 - 30 U/mL Final     Comment:     Assay Method:  Chemiluminescence using Siemens Centaur XP   11/17/2017 82 (H) 0 - 30 U/mL Final     Comment:     Assay Method:  Chemiluminescence using Siemens Centaur XP   10/20/2017 108 (H) 0 - 30 U/mL Final     Comment:     Assay Method:  Chemiluminescence using Siemens Centaur XP   09/22/2017 187 (H) 0 - 30 U/mL Final     Comment:     Assay Method:  Chemiluminescence using Siemens Centaur XP   08/10/2017 175 (H) 0 - 30 U/mL Final     Comment:     Assay Method:  Chemiluminescence using Siemens Centaur XP   07/13/2017 171 (H) 0 - 30 U/mL Final     Comment:     Assay Method:  Chemiluminescence using Siemens Centaur XP     nchanged left supraclavicular and  right hilar lymphadenopathy in  the chest.    Past Medical History:   Diagnosis Date     Antiplatelet or antithrombotic long-term use      Ascites      Blood clot in the legs      Diabetes (H)      Ovarian cancer (H)     serous,stg IV     Pleural effusion      Pulmonary embolism (H) 2/2012     Refusal of blood transfusions as patient is Religion      Short gut syndrome      Subclinical hypothyroidism 4/18/2013     Thrombosis of leg        Past Surgical History:   Procedure Laterality Date     COLECTOMY       COLONOSCOPY  2/1/2012    Procedure:COLONOSCOPY; With Biopsy; Surgeon:TONI SULLIVAN; Location:[facility]     HYSTERECTOMY TOTAL ABD, RHIANNA SALPINGO-OOPHORECTOMY, NODE DISSECTION, TUMOR DEBULKING, COMBINED  2/1/2012    Procedure:COMBINED HYSTERECTOMY TOTAL ABDOMINAL, BILATERAL SALPINGO-OOPHORECTOMY, NODE DISSECTION, TUMOR DEBULKING;  Exploratory Laparotomy, Total Abdominal Hysterectomy, Bilateral Salpingo-Oophorectomy, appendectomy,lysis of adhesions, ileal, ascending, transverse and splenic flexure resection, ileal descending bowel renanastomosis, incidental cystotomy repair, CUSA procedure and colonoscopy ; Curtis     INSERT PORT PERITONEAL ACCESS  4/3/2012    Procedure:INSERT PORT PERITONEAL ACCESS; Intraperitoneal Port Placement (c-arm); Surgeon:SAMUEL CARRASCO; Location:[facility]     INSERT PORT PERITONEAL ACCESS  5/14/2014    Procedure: INSERT PORT PERITONEAL ACCESS;  Surgeon: Nga Yeung MD;  Location[facility]     INSERT PORT VASCULAR ACCESS       LAPAROSCOPY DIAGNOSTIC (GYN)  5/14/2014    Procedure: LAPAROSCOPY DIAGNOSTIC (GYN);  Surgeon: Nga Yeung MD;  [facility]     LAPAROTOMY EXPLORATORY Right 11/25/2015    Procedure: LAPAROTOMY EXPLORATORY;  Surgeon: Nga Yeung MD;  [facility]     LAPAROTOMY, TUMOR DEBULKING, COMBINED  5/14/2014    Procedure: COMBINED LAPAROTOMY, TUMOR DEBULKING;  Surgeon: Nga Yeung MD;  [facility]     REMOVE CATHETER PERITONEAL  N/A 8/20/2014    Procedure: REMOVE CATHETER PERITONEAL;  Surgeon: Nga Yeung MD;  [facility]     VASCULAR SURGERY      stent left iliac vein       Current Outpatient Prescriptions   Medication     Ascorbic Acid (VITAMIN C PO)     ASPIRIN PO     Calcium Carbonate-Vitamin D (CALCIUM + D PO)     Cholecalciferol (VITAMIN D PO)     cyanocobalamin (VITAMIN B12) 1000 MCG/ML injection     diphenoxylate-atropine (LOMOTIL) 2.5-0.025 MG per tablet     Ferrous Sulfate 324 (65 Fe) MG TBEC     HERBALS     iohexol (OMNIPAQUE) 140 MG/ML SOLN solution     iohexol (OMNIPAQUE) 140 MG/ML SOLN solution     LEVOTHYROXINE SODIUM PO     LORazepam (ATIVAN) 1 MG tablet     magnesium oxide (MAG-OX) 400 MG tablet     METFORMIN HCL PO     order for DME     potassium chloride (KLOR-CON) 20 MEQ packet     prochlorperazine (COMPAZINE) 10 MG tablet     VITAMIN E NATURAL PO     No current facility-administered medications for this visit.      Facility-Administered Medications Ordered in Other Visits   Medication     heparin 100 UNIT/ML injection 500 Units          No Known Allergies    Family History   Problem Relation Age of Onset     Cancer Mother 69     lung, smoker     Cancer Maternal Uncle 65     brain     Colon Cancer Maternal Aunt 80     colon       Social History     Social History     Marital status:      Spouse name: N/A     Number of children: N/A     Years of education: N/A     Occupational History     Not on file.     Social History Main Topics     Smoking status: Former Smoker     Years: 8.00     Types: Cigarettes     Quit date: 6/21/1980     Smokeless tobacco: Never Used      Comment: started at 9 yo and quit at 18 yo     Alcohol use Yes      Comment: 3x/day wine or jodi     Drug use: No     Sexual activity: Not on file     Other Topics Concern     Not on file     Social History Narrative       Review of Systems     Constitutional:  Positive for fatigue. Negative for fever, chills, weight loss, weight gain,  decreased appetite, night sweats, recent stressors, height gain, height loss, post-operative complications, incisional pain, hallucinations, increased energy, hyperactivity and confused.   HENT:  Positive for tinnitus. Negative for ear pain, hearing loss, nosebleeds, trouble swallowing, hoarse voice, mouth sores, sore throat, ear discharge, tooth pain, gum tenderness, taste disturbance, smell disturbance, hearing aid, bleeding gums, dry mouth, sinus pain, sinus congestion and neck mass.    Eyes:  Negative for double vision, pain, redness, eye pain, decreased vision, eye watering, eye bulging, eye dryness, flashing lights, spots, floaters, strabismus, tunnel vision, jaundice and eye irritation.   Respiratory:   Negative for cough, hemoptysis, sputum production, shortness of breath, wheezing, sleep disturbances due to breathing, snores loudly, respiratory pain, dyspnea on exertion, cough disturbing sleep and postural dyspnea.    Cardiovascular:  Negative for chest pain, dyspnea on exertion, palpitations, orthopnea, claudication, leg swelling, fingers/toes turn blue, hypertension, hypotension, syncope, history of heart murmur, chest pain on exertion, chest pain at rest, pacemaker, few scattered varicosities, leg pain, sleep disturbances due to breathing, tachycardia, light-headedness, exercise intolerance and edema.   Gastrointestinal:  Positive for diarrhea. Negative for heartburn, nausea, vomiting, abdominal pain, constipation, blood in stool, melena, rectal pain, bloating, hemorrhoids, bowel incontinence, jaundice, rectal bleeding, coffee ground emesis and change in stool.   Genitourinary:  Negative for bladder incontinence, dysuria, urgency, hematuria, flank pain, vaginal discharge, difficulty urinating, genital sores, dyspareunia, decreased libido, nocturia, voiding less frequently, arousal difficulty, abnormal vaginal bleeding, excessive menstruation, menstrual changes, hot flashes, vaginal dryness and  postmenopausal bleeding.   Musculoskeletal:  Negative for myalgias, back pain, joint swelling, arthralgias, stiffness, muscle cramps, neck pain, bone pain, muscle weakness and fracture.   Skin:  Negative for nail changes, itching, poor wound healing, rash, hair changes, skin changes, acne, warts, poor wound healing, scarring, flaky skin, Raynaud's phenomenon, sensitivity to sunlight and skin thickening.   Neurological:  Negative for dizziness, tingling, tremors, speech change, seizures, loss of consciousness, weakness, light-headedness, numbness, headaches, disturbances in coordination, extremity numbness, memory loss, difficulty walking and paralysis.   Endo/Heme:  Negative for anemia, swollen glands and bruises/bleeds easily.   Psychiatric/Behavioral:  Negative for depression, hallucinations, memory loss, decreased concentration, mood swings and panic attacks.    Breast:  Negative for breast discharge, breast mass, breast pain and nipple retraction.   Endocrine:  Negative for altered temperature regulation, polyphagia, polydipsia, unwanted hair growth and change in facial hair.        Physical Exam:    /85  Pulse 87  Temp 97.5  F (36.4  C) (Oral)  Resp 16  Wt 64.2 kg (141 lb 9.6 oz)  SpO2 97%  BMI 23.56 kg/m2    General: Alert non-toxic appearing female in no acute distress  HEENT: Normocephalic, atraumatic; PERRLA; no scleral icterus; oropharynx pink without lesions; neck supple  Pulmonary: Lungs clear to auscultation, no increased work of breathing noted  Cardiac: Regular rate and rhythm, S1S2, no clicks, murmurs, rubs, or gallops; bilateral lower extremities without edema  GI: Normoactive bowel sounds x4 quadrants, abdomen soft, non-distended, and non-tender to palpation without masses or organomegaly  : Not indicated  Heme: Cervical, supraclavicular, and inguinal nodes without lymphadenopathy  MSK: Moves all extremities, no obvious muscle wasting  Neuro: No gross deficits, normal gait  Skin:  Appropriate color for race, warm and dry, no rashes or lesions to unclothed skin; no palmar plantar erythrodysesthesia  Psych: Pleasant and interactive, affect bright, makes appropriate eye contact, thought process linear    Labs:     6/20/18:  170.    Assessment/Plan:  1) Recurrent stage IIIC bilateral ovarian cancer: s/p 3 cycles of carboplatin/Doxil + 3 cycles carbo/doxil/avastin, CT scan worrisome for disease progression and increasing . Discussed FATE NK cell trial at length with patient and  in clinic today. Patient understands that she would not be treated until mid-August or early September. Of note, she is a [employer]. She and her  will need to make sure that NK cell infusion is ok. Will put patient on trial waitlist. Patient to call clinic if she wants to be taken off. Also offered weekly taxol.   Reviewed signs and symptoms for when she should contact the clinic or seek additional care, including but not limited to fever, chills, inability to keep down food or fluids, nausea and vomiting not controlled with antiemetics, and diarrhea leading to dehydration. Patient to contact the clinic with any questions or concerns in the interim.     -biliary duct dilatation: No jaundice, patient eating and drinking well. Will continue to monitor this, may need stent placement in future.     -labs/tests ordered: Liver US, liver function labs     -Tumor testing for PDL-1    -Will send tumor to [facility] for targeted therapies    -Will continue to look at other phase I trials, will contact patient with any additional options.     2) Genetic counseling: Testing has been performed and she is negative for mutations in BRCA1, BRCA2, EPCAM, MLH1, MSH2, MSH6, PMS2, PTEN, and TP53 genes    3) Health maintenance issues discussed include to continue following up with PCP for non-gynecologic concerns.    4) Patient verbalized understanding of and agreement with plan    A total of 35 minutes  spent with patient, over 50% spent in counseling and coordination of care.    Nga Yeung MD    Department of Ob/Gyn and Women's Health  Division of Gynecologic Oncology  [facility]  [phone]    I, Jarek Young, am serving as a scribe to document services personally performed by Nga Yeung MD, based upon my observations and the provider's statements to me. All documentation has been reviewed by the aforementioned doctor prior to being entered into the official medical record.     I have reviewed the above note and agree with the scribe's notation as written.

## 2018-06-21 NOTE — MR AVS SNAPSHOT
After Visit Summary   6/21/2018    Odalys Nathan    MRN: [id number]           Patient Information     Date Of Birth          1960        Visit Information        Provider Department      6/21/2018 10:40 AM Nga Yeung MD [facility]        Today's Diagnoses     Metastatic cancer (H)    -  1    Ovarian cancer, right (H)        Ovarian cancer, left (H)        Intrahepatic bile duct dilation           Follow-ups after your visit        Your next 10 appointments already scheduled     Jun 22, 2018  7:30 AM CDT   (Arrive by 7:15 AM)   US ABDOMEN COMPLETE with UCUS2   [facility] ([facility])    [address]  [facility] [address] [phone]           Please bring a list of your medicines (including vitamins, minerals and over-the-counter drugs). Also, tell your doctor about any allergies you may have. Wear comfortable clothes and leave your valuables at home.  Adults: No eating or drinking for 8 hours before the exam. You may take medicine with a small sip of water.  Children: - Children 6+ years: No food or drink for 6 hours before exam. - Children 1-5 years: No food or drink for 4 hours before exam. - Infants, breast-fed: may have breast milk up to 2 hours before exam. - Infants, formula: may have bottle until 4 hours before exam.  Please call the Imaging Department at your exam site with any questions.              Future tests that were ordered for you today     Open Future Orders        Priority Expected Expires Ordered    US Abdomen Complete Routine  6/21/2019 6/21/2018    CBC with platelets differential Routine 6/20/2018 6/23/2018 6/20/2018    Mag Routine 6/20/2018 6/23/2018 6/20/2018    CMP - Comprehensive Metabolic Panel Routine 6/20/2018 6/23/2018 6/20/2018            Who to contact     If you have questions or need follow up information about today's clinic visit or your schedule please contact  [facility] directly at [phone].  Normal or non-critical lab and imaging results will be communicated to you by MyChart, letter or phone within 4 business days after the clinic has received the results. If you do not hear from us within 7 days, please contact the clinic through MyChart or phone. If you have a critical or abnormal lab result, we will notify you by phone as soon as possible.  Submit refill requests through [employer] or call your pharmacy and they will forward the refill request to us. Please allow 3 business days for your refill to be completed.          Additional Information About Your Visit        [employer] Information     [employer] gives you secure access to your electronic health record. If you see a primary care provider, you can also send messages to your care team and make appointments. If you have questions, please call your primary care clinic.  If you do not have a primary care provider, please call [phone] and they will assist you.        Care EveryWhere ID     This is your Care EveryWhere ID. This could be used by other organizations to access your [place] medical records  [id number]        Your Vitals Were     Pulse Temperature Respirations Pulse Oximetry BMI (Body Mass Index)       87 97.5  F (36.4  C) (Oral) 16 97% 23.56 kg/m2        Blood Pressure from Last 3 Encounters:   06/21/18 129/85   03/05/18 108/58   02/23/18 134/69    Weight from Last 3 Encounters:   06/21/18 64.2 kg (141 lb 9.6 oz)   03/05/18 63.6 kg (140 lb 3.2 oz)   02/09/18 64 kg (141 lb 1.6 oz)               Primary Care Provider Office Phone # Fax #    Aubrie Airam Hester [phone]       [facility] [address] [address] [facility] [address]        Equal Access to Services     SANDY CHAMBERS AH: Hadii bj landao Socait, waaxda luqadaha, qaybta kaalmada marilyyajulieta, blanka szymanski. So [facility] [phone].    ATENCIÓN: benjamin Hair  bell disposición servicios gratuitos de asistencia lingüística. Liban cabrera [phone].    We comply with applicable federal civil rights laws and [place] laws. We do not discriminate on the basis of race, color, national origin, age, disability, sex, sexual orientation, or gender identity.            Thank you!     Thank you for choosing [facility]  for your care. Our goal is always to provide you with excellent care. Hearing back from our patients is one way we can continue to improve our services. Please take a few minutes to complete the written survey that you may receive in the mail after your visit with us. Thank you!             Your Updated Medication List - Protect others around you: Learn how to safely use, store and throw away your medicines at www.disposemymeds.org.          This list is accurate as of 6/21/18 11:28 AM.  Always use your most recent med list.                   Brand Name Dispense Instructions for use Diagnosis    CALCIUM + D PO      Take 1 tablet by mouth daily.    Pelvic mass       cyanocobalamin 1000 MCG/ML injection    VITAMIN B12    1 mL    Inject 1 mL (1,000 mcg) into the muscle every 30 days    B12 deficiency       diphenoxylate-atropine 2.5-0.025 MG per tablet    LOMOTIL    60 tablet    Take 2 tablets by mouth 4 times daily as needed for diarrhea    Acute diarrhea       Ferrous Sulfate 324 (65 Fe) MG Tbec     90 tablet    Take 324 mg by mouth 3 times daily (with meals) Take with a small amount of orange juice. Do not take with calcium    Ovarian cancer, right (H), Anemia, unspecified type, Ovarian cancer, left (H)       HERBALS      daily        * iohexol 140 MG/ML Soln solution    OMNIPAQUE    140 mL    Mix entire bottle (50ml) of contast with 600ml (20 ounces) of water and drink half 2 hrs prior to CT scan and half 1 hr prior to scan    Ovarian cancer, right (H), Metastatic cancer (H)       * iohexol 140 MG/ML Soln solution    OMNIPAQUE    50 mL    Mix entire  bottle (50ml) of contast with 600ml (20 ounces) of water and drink half 2 hrs prior to CT scan and half 1 hr prior to scan    Personal history of ovarian cancer       LEVOTHYROXINE SODIUM PO      Take by mouth daily        LORazepam 1 MG tablet    ATIVAN    30 tablet    Take 1 tablet (1 mg) by mouth every 6 hours as needed (Anxiety, Nausea/Vomiting or Sleep)    Ovarian cancer, unspecified laterality (H)       magnesium oxide 400 MG tablet    MAG-OX    90 tablet    Take 1 tablet (400 mg) by mouth 2 times daily    Ovarian cancer, unspecified laterality (H)       order for DME     3 each    Injection Supplies for Vitamin B12: 3cc syringes w/ 27 gauge needles, 1/2 inch length    B12 deficiency       potassium chloride 20 MEQ Packet    KLOR-CON     Take 20 mEq by mouth daily        prochlorperazine 10 MG tablet    COMPAZINE    30 tablet    Take 1 tablet (10 mg) by mouth every 6 hours as needed (nausea/vomiting)    Encounter for long-term (current) use of medications, Ovarian cancer, right (H), Ovarian cancer, left (H), Drug-induced neutropenia (H)       VITAMIN C PO      Take 500 mg by mouth daily    Pelvic mass       VITAMIN D PO      Take 1,000 Units by mouth daily Unknown dose        VITAMIN E NATURAL PO      Take 100 Units by mouth daily        * Notice:  This list has 2 medication(s) that are the same as other medications prescribed for you. Read the directions carefully, and ask your doctor or other care provider to review them with you.

## 2018-06-21 NOTE — NURSING NOTE
Chief Complaint   Patient presents with     Lab Only     labs drawn with vpt by rn.     Labs drawn with vpt by rn.  Pt tolerated well.    Peg Jauregui RN

## 2018-06-21 NOTE — PROGRESS NOTES
Care Coordinator Note  Reviewed plan of care.   Patient seen in Clinic to reviewed scan which shows increase in her disease offered her NK study or weekly Taxol  Labs to be done today and US tomorrow. Will review with pt on Friday after US has been completed.   Discussed side effects of weekly taxol. Would like it at [place] if possible.   She and her  will discuss and get back to us regarding Study vs the weekly Taxol.   Offered support.     Patient verbalized back understanding of the above information discussed.   Face to face time spent with patient 5.  Tammy CISNEROS RN, OCN  Care Coordinator   Gynecologic Cancer   Office [phone]  Pager [phone] #[phone]

## 2018-06-21 NOTE — NURSING NOTE
"Oncology Rooming Note    June 21, 2018 10:30 AM   Odalys Nathan is a 57 year old female who presents for:    Chief Complaint   Patient presents with     Oncology Clinic Visit     Ovarian CA; CT results     Initial Vitals: /85  Pulse 87  Temp 97.5  F (36.4  C) (Oral)  Resp 16  Wt 64.2 kg (141 lb 9.6 oz)  SpO2 97%  BMI 23.56 kg/m2 Estimated body mass index is 23.56 kg/(m^2) as calculated from the following:    Height as of 3/5/18: 1.651 m (5' 5\").    Weight as of this encounter: 64.2 kg (141 lb 9.6 oz). Body surface area is 1.72 meters squared.  No Pain (0) Comment: Data Unavailable   No LMP recorded. Patient has had a hysterectomy.  Allergies reviewed: Yes  Medications reviewed: Yes    Medications: Medication refills not needed today.  Pharmacy name entered into ApplyMap:    [id number] - [place], [place] [facility] [address] [facility] - [place], [place] - [address]  [address] S[address]  [facility] [place] [address] [place] [address] PHARMACY [place], [place] - [phone]    Clinical concerns: Patient states there are no new concerns to discuss with provider.  Dr Yeung was not notified.       8 minutes for nursing intake (face to face time)     Sophia Oneill CMA              "

## 2018-06-21 NOTE — LETTER
2018       RE: Odalys Nathan  [address]  [facility] [id number]     Dear Colleague,    Thank you for referring your patient, Odalys Nathan, to the [facility]. Please see a copy of my visit note below.    Gynecologic Oncology Follow-Up Note  RE: Odalys Nathan  MRN: [id number]  : 1960  Date of Visit: 2018    CC: Odalys Nathan is a 57 year old year old female with recurrent stage IIIC bilateral ovarian cancer who presents today for disease and treatment management. She has recently completed 3 cycles of carbo/doxil followed by 3 cycles carbo/doxil + avastin for a total of 6 cycles. New CT scan worrisome for disease progression.     HPI: Odalys presents to clinic today feeling fairly fatigued. We discussed increasing  (now 170, up from 49) as well as recent CT scan at length. I am expressed my concern with her disease as well is biliary duct dilatation. No jaundice noticeable in clinic today, patient reports she has been eating and drinking well. Will check liver function today and order US of liver. Discussed possible need for stent placement in future. Also discussed possible clinic trials. At this point Odalys is platinum resistant and has had multiple lines of treatment including parp-inhibitor. NK cell trial is an option however, she would not be able to be treated until  due to trial waitlist and she will need to find donor. Other option would be weekly taxol. Will send tumor to [facility] [place] for possible targeted therapies. Odalys is otherwise doing well. No vaginal bleeding or abnormal discharge.     Brief Oncology History:  2012 - Admitted to hospital for 2 weeks of intermittent abdominal cramping, distention, diarrhea and N/V. CT of abdomen/pelvis significant for small bowel obstruction, a heterogenous soft tissue density in the pelvis, omental nodules, and ascites. Bilateral adnexal masses per U/S of pelvis.  CA-125 was elevated at 987, CEA was normal at 1.0.    1/18/12 - Therapeutic paracentesis (4 L) with cytology confirming malignancy (NV positive, weak ER positive, CK-7 positive consistent with GYN primary). Surgery recommended d/t potential for falling blood counts 2/2 chemotherapy (patient is Sikh and limiting blood transfusion)    2/1/12 - Exploratory laparotomy, MAR BSO, lysis of adhesion, Appendectomy, Repair cystotomy, Omentectomy Qbba-nzuxif-yhczibylr-transverse-colon resection, Ileo-descending colon anastomosis, CUSA, & Colonoscopy (done by Dr. Amato and colorectal team).  2/20/2012 - Admitted to hospital for bilateral pulmonary emboli and drainage of pleural effusion. Started on Lovenox.  2/28/12-3/21/12: Cycle #1-2 Carbo/Taxol IV  3/29/12 - Started on Keflex by her PCP for infection in her healing wound (immediately below her umbilicus).    4/11/12-6/14/12: Cycle #3-6 IV chemo, Cycle #1 IV/IP chemo  7/16/12 -  8, CT PRADEEP - enrolled in  - observation arm  3/4/13-6/28/13:  6, 9, 12, 15, 20.  7/1/13: CT Chest, Abdomen, Pelvis IMPRESSION:  1. Worsening metastatic ovarian carcinoma suggested by increased size of soft tissue nodules anterior to the right psoas muscle, that may represent growing mesenteric lymphadenopathy.  2. Remaining prominent right lower quadrant mesenteric lymph nodes are not significantly changed from CT 7/16/2012.  3. Clustered nodular opacities in the right lower lobe are not significant change from 7/16/2012 and remain indeterminate. Again, the appearance and distribution is suggestive of an infectious etiology.  4. Stable 8 mm soft tissue nodule in left breast, unchanged since at least 02/20/2012. This can be observed on followup studies, but correlation with mammography could be considered.  Decision made to start Doxil/Carbo.  7/11/13: The left ventricular ejection fraction is normal at 66.4%.  7/12/13-9/6/13: Cycle #1-3 Doxil/Carbo.  10, 11.     9/30/13 CT C/A/P Impression:  1. Overall, favorable response to treatment with decreasing size of soft tissue nodules tracking along the anterior aspect of the right psoas muscle.  2. 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  3. Improved cluster of right lower lobe pulmonary nodular opacities. These may represent resolving infection.  10/3/13-12/9/13:  9, 8, 10. Cycle 4-6 Doxil/Carbo.    1/13/14 CT C/A/P Impression:   1. Stable appearance of metastatic ovarian cancer. Scattered soft tissue nodules along the anterior aspect of the right psoas muscle are unchanged in size. Mild mesenteric lymphadenopathy is unchanged.  2. Clustered micronodules in the right lower lobe are unchanged from 9/30/2013, but improved from 7/1/2013. This history suggests a postinflammatory/postinfectious etiology.  3. 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  1/16/14 Discussed multiple options for her based on relatively stable-appearing disease on CT but slight increase in  (which has had small increase to 20 with last recurrence) including chemo break with recheck of  in 1 month, starting new chemo agent immediately, and exploratory surgery with possible resection of nodules. She is considered platinum-sensitive based on > 1 year remission after Taxol/Carbo, which we will take into consideration for future chemo planning. I am not inclined to surgery at this time given difficulty she already has with diarrhea secondary to past colon resection. I suspect we would need to resect further bowel due to mesenteric disease. Also explained inherent risks of any major surgery. Also mentioned maintenance chemo, but this has not been shown to increase overall survival and would likely decrease her quality of life without significant benefit. Family is going on vacation to [place] in 2 weeks and Odalys does not want to have chemo prior to that, so will plan to take 1 month break. She can have  at that time  "(discussed checking toady since last draw was in early December, but as it would likely not change treatment plan and she has h/o slow rising , will not check today).  2/17/14  16    2/20/14: Decision to take break from chemo for two months, followed by CT and CA-125.    4/21/14  27  4/21/14 CT C/A/P Impression:    1. Increased size of 2 low-attenuation lymph nodes anterior to the right psoas muscle is concerning for worsening metastatic ovarian cancer.    2. New circumferential thickening of a 3.8 cm length segment of distal transverse colon is likely physiologic. Recommend attention on followup imaging.    3. Grossly unchanged size of clustered small nodules versus scarring in the right lower lobe the lungs.    4. Stable thrombosis of the right ovarian vein.  5/14/14: Diagnostic laparoscopy converted to exploratory laparotomy and removal of mesenteric masses, tumor debulking, peritoneal biopsies and intraperitoneal port placement. On laparoscopy, it was noted that there were small nodules on the anterior abdominal wall near the previous incision, small nodules on the right pelvic sidewall as well. Nodules were palpated in the mesentery; however, as it was unable to clarify where the origin of the nodules was, the decision was made to open the patient. On opening there was found to be approximately a 3 cm nodule in the small bowel mesentery and another separate approximately 2 cm nodule in the bowel mesentery. Pelvis without evidence of cancer, some mesenteric lymph nodes were palpated. No evidence otherwise of any disseminated cancer throughout the abdomen.    FINAL DIAGNOSIS:  A: Peritoneum, right paracolic gutter, biopsy:  -Necrotic tissue  -No viable tumor present  B: Soft tissue, anterior abdominal wall nodule, biopsy:  -Fibroadipose tissue with abundant macrophages, fibrosis and calcifications  -Negative for malignancy   C: Lymph nodes, mesentery, \"nodule\", excision:  -Metastatic/recurrent " "high grade serous carcinoma in two of two lymph nodes (2/2)  -Largest metastasis: 1.3 cm  -See comment  D: Peritoneum, right paracolic gutter #2, biopsy:  -Fibroadipose tissue with granulomatous inflammation surrounding refractile material  -Negative for malignancy   E: Small bowel adhesion, biopsy:  -Fibroconnective tissue, consistent with adhesion  -Negative for malignancy  F: Lymph nodes, mesentry, not otherwise specified, excision:  -Two lymph nodes, negative for metastatic carcinoma (0/2)  G: Lymph node, mesentery, \"#2\", excision:  -One lymph node, negative for metastatic carcinoma (0/1)  H: Lymph nodes, mesentery, \"nodule #2\", excision:  -Five lymph nodes, negative for metastatic carcinoma (0/5)  COMMENT:  Some of the specimens show post-operative changes. Others show possible treatment related changes, including necrosis. The metastatic carcinoma in the mesenteric lymph nodes (specimen C) shows variable morphology, including relatively low grade tumor with papillary architecture, and high grade tumor comprised of nests of tumor cells with irregular, slit-like spaces and marked nuclear pleomorphism.    5/29/14: Cycle 1 IV PACLitaxel / IP CISplatin / IP PACLitaxel.  - 28.  6/26/14: Cycle #2 IV/IP.  10  8/5/14: CT chest/abd/pelvis IMPRESSION     1. In this patient with ovarian cancer, overall findings are indicative of stable/slight improvement, as multiple mesenteric lymphadenopathy and scattered nodular peritoneal soft tissue mass lesions appear unchanged or slightly smaller since 4/21/2014.    2. 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 Mild dilatation of the second and the third portion of the duodenum with a narrow SMA angle. This could represent SMA syndrome, if clinically correlated.17/14:    Cycle #3 IV/IP.  10.    CT chest/abd/pelvis with contrast on 8/5/14    Impression:    1. In this patient with ovarian cancer, overall findings are indicative of stable/slight " improvement, as multiple mesenteric lymphadenopathy and scattered nodular peritoneal soft tissue mass lesions appear unchanged or slightly smaller since 4/21/2014.    2. 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 Mild dilatation of the second and the third portion of the duodenum with a narrow SMA angle. This could represent SMA syndrome, if clinically correlated.  8/7/14: Cycle #4 Taxol/Carbo (changed from IV/IP).  10. She has been feeling okay. She is unsure if she can finish out the course of 6 cycles IV/IP taxol/cisplatin. She feels like she has the flu for about a week then starts feeling gradually better after each chemo cycle. Her spouse notes that she actually has been more sick with the treatments than she initially admits here. She was also previously having some rib pain. Denies any rib pain now. Denies any chest pain or shortness of breath.  Plan: discussed recent CT cap results and switching to just IV as she is feeling miserable with IP treatments. Switch to IV carbo/taxol as patient is platinum sensitive.  We also discussed her taking part of the tesaro trial, which would require BRCA testing. She would like to take part in this trial if eligible.  8/20/14: Remove Intraperitoneal Port ( Port and catheter intact - discarded)  8/28/14: Cycle #5 Taxol/Carbo held due to thrombocytopenia.  6.    She denies any vaginal bleeding, no changes in her bowel or bladder habits, no nausea/emesis, no lower extremity edema, and no difficulties eating or sleeping. She denies any abdominal discomfort/bloating, no fevers or chills, and no chest pain or shortness of breath. She states her diarrhea is the same. She reports some fatigue which improves about 1-2 weeks after her chemotherapy. She states she does not need any medication refills and she was told she does not meet the criteria for the TESARO trial. She states she has 3 bags of iv fluids left over from her previous chemotherapy and  will give these to herself. She states she is ready for her treatment today.    9/29/14: Cycle #6 Taxol/Carbo  6. Insurance questions regarding GSF coverage today. No concerns other than fatigue. Taking iron for anemia and does not desire blood transfusion. Using neulasta for neutropenia. Using home IV hydration if needed. Baseline unchanged. No abdominal bloating, constipation, diarrhea, pain, vaginal or rectal bleeding, cough or dyspnea, fluid retention.    10/16/14: Impression:    1. Nodular peritoneal soft tissue mass in the right lower quadrant adjacent to the psoas muscle is no longer appreciated. Adjacent prominent lymphadenopathy is unchanged from previous exam. No new peritoneal lesions.    2. 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    10/20/14:  5. CT chest/abdomen/pelvis on 10/16/14 showed nodular peritoneal soft tissue mass in the right lower quadrant adjacent to the psoas muscle is no longer appreciated. Adjacent prominent lymphadenopathy is unchanged from previous exam. 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  1/27/15:  6.  4/28/15:  14.  5/26/15:  18.  6/2/15: CT cap Impression:  1. Postsurgical changes of hysterectomy and bilateral salpingo-oophorectomy for ovarian cancer. There is a new 8 mm hazy, ill-defined hypoattenuating lesion in hepatic segment 6 which is suspicious for a metastatic deposit. Further evaluation with ultrasound in recommended.    2. Increased size of a left retroperitoneal lymph node which is indeterminate but may represent a ciro metastasis. Mildly prominent lymph nodes in the right lower quadrant are not significantly changed.  3. Moderate colonic stool burden.    6/4/15: US abdomen IMPRESSION:    Hyperechoic lesion in the right hepatic lobe, consistent with hemangioma. This does not corresponds to the area of the lesion seen on CT from 6/2/2015. An MR would be helpful for identifying  and characterizing the lesion from the recent CT.  6/12/15: MR abd IMPRESSION:  1. New 20 x 11 mm enhancing lesions between the right obliques, concerning for metastatic disease. This lesions should be amenable to percutaneous biopsy, if indicated.  2. Correlating to the lesion visualized on comparison CT is a hepatic segment 6 subcapsular 7 mm lesion. Overall the appearance favors the diagnosis of a simple cyst. However, there is faint suggestion of mild peripheral arterial enhancement. Although this is favored as  artifactual, this should be followed up to confirm stability. Recommend 6 month followup.  3. Hepatic segment 6, 5 mm lesion too small to technically characterize. Differential would favor FNH, less likely flash filling hemangioma. Recommend attention on followup.  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     9/1/15: Cycle #1 Avastin/Cytoxan.   31.     9/24/15:feeling generally well. She says she has been having back and stomach spasms. She is taking cytosine daily (she ran out yesterday). She also says its affecting her voice. Admits that it burns occasionally. She is eating and drinking normal. She also admit diarrhea, 5-7 times daily, lose/watery. She trying to stay hydrated and eat fiber. She also says that her body is sore, especially the bottom of her feet. Her blood pressure is normal. She also admits having headache after her first infusion.      9/24/15: Cycle #2 Avastin/Cytoxan.  19.  10/15/15: Cycle #3 Avastin/Cytoxan.  16.     11/6/15: CT c/a/p IMPRESSION:    1. Stable postoperative change of MAR/BSO for ovarian cancer.  2. The lesion in the right flank abdominal musculature is slightly decreased in size. Otherwise, stable examination.  2. No evidence of metastatic disease in the chest.    11/20/15: Treatment planning visit,   16    11/25/15: surgical pathology report  FINAL DIAGNOSIS:  Soft tissue, right oblique muscle mass, excision:  -Recurrent ovarian serous carcinoma  -Carcinoma is present less than 1 mm from one resection margin  -Background skeletal muscle and fibroadipose tissue    1/4/16:  22  1/11/16-1/27/16: Radiation to right flank x 12 treatments  4/7/2016:  94. CT cap IMPRESSION:    In this patient with ovarian cancer status post MAR/BSO and descending/transverse colectomy:  1. No evidence for malignancy in the chest, abdomen, or pelvis.  2. Stable small hypodense segment 6 liver lesion, appears more likely benign, possibly a cyst.  5/13/16:  124.  6/3/16: PET CT IMPRESSION: In this patient with a history of ovarian cancer:  1. Hypermetabolic and enlarging periaortic and perihepatic lymphadenopathy compatible with metastatic disease, as detailed above.  2. Although hypodense lesion in hepatic segment 6 has been present since 6/2/2015 associated hypermetabolism makes this lesion highly concerning for metastatic disease.    Plan: to start Niraparib under TESARO study.     6/9/16:  137.  6/14/16: Cycle #1 Niraparib.    6/28/16: Cycle #1 D15 Niraparib.    7/5/16:  100.    7/11/16: Cycle #2 Niraparib.   83.    8/3/2016: PET CT IMPRESSION:    In this patient with known history of ovarian cancer:  1) New pleural based nodular opacities in the lateral and inferior aspects of the bilateral lower lobes, worse in the left lung. Likely infection. Close follow up is recommended.      2) Slight decrease in hypermetabolic abdominal lymphadenopathy. 2 hypermetabolic lymph nodes persist.  3) Unchanged right hepatic lobe metastatic lesion.     8/9/16: Cycle #3 Niraparib.  69.  9/6/16: Cycle #4 Niraparib.  53. Dose held due to anemia.  9/13/16: Eval for potential cycle 4 niraparib. Dose held due to anemia.  10/4/16: CT CAP impression:  IMPRESSION: In this patient with a known history of ovarian  cancer:  1. There has been interval resolution of pleural-based nodular  opacities which likely represented infection.  2. Abdominal lymphadenopathy in the form of 2 portacaval lymph nodes  have not significantly changed in size, noted to be hypermetabolic on  prior PET/CT.  3. Previously demonstrated metastatic lesion in the right lobe of the  liver is not significantly changed.  10/11/16:  60  11/1/16: C6 niraparib,  75  11/29/16: C7 niraparib.  78. CT CAP impression as follows:  Target lesions (RECIST criteria):       A previously described target lesion superior to the head of the  pancreas (series 2, image 64)  (referred to as a perihepatic node on  6/3/2016) may not be a valid target lesion because it measured less  than 1.5 cm originally. However, this particular node has decreased in  size, now measuring 7 mm in short axis versus 14 mm on 6/3/2016 when  measured in a similar fashion.       2.3 cm short axis portacaval lymph node on series 2 image 67,  previously 2.0 cm on 10/4/2016.       1.2 cm subtle hypodensity in hepatic segment 6 on series 2 image 75,  stable on multiple studies since at least 6/3/2016     Sum of diameters today: 3.5 cm. Sum of diameters 10/4/2016: 3.2 cm.  Growth = 9%.    12/27/16: C8 niraparib.  105.  1/25/17: C9 niraparib.  108.  2/23/17: C10 niraparib.  132.   CT CAP impression:  Sum of target lesion diameters today: 3.7 cm. Sum of target lesion  diameters on 11/28/2016: 3.5 cm. Growth= 6%  1. In this patient with history of ovarian cancer there is stable  disease by RECIST criteria as evidenced by:   1a. Mildly increased size of liver metastasis.  1b. Stable portacaval lymphadenopathy.  1c. No evidence of metastatic disease in the chest.  2. Trace emphysematous changes of the lungs.  3/22/17: C11 niraparib.  132.  4/19/17: C12 niraparib.  127.  5/16/17: CT CAP IMPRESSION: In this patient with ovarian cancer:  1. Mildly increased size  of hepatic metastasis segment 6 with subtle increased capsular retraction.  2. Stable edmundo hepatis nodes, with mild increase in size of periaortic lymph nodes.  3. Prominent left supraclavicular lymph node with subtle increase in size compared to prior studies, particularly comparing to 10/4/2016.  4. Mild subtle groundglass opacities in the right upper lobe, not present on prior study, lesser extent in the right lower lobe.  Findings may represent infection, additional consideration is malignancy (less likely), and attention on follow-up study  Recommended.  Addendum:   Prominent left supraclavicular lymph node (3/25) is stable from most recent CT performed 2/20/2017, currently measuring 14 x 16 mm, previously 14 x 16 mm on 2/20/2017.      The portal caval lymph node/edmundo hepatis lymph node is stable from 2/20/2017, measuring 21 mm.      Clarification of size of the para-aortic lymph node (series 3 image 327). It short axis measurement is 11 mm versus 9 mm on prior study.      Hepatic segment 6 triangular-shaped low density lesion (3/362) measures 14 mm, previously measured 12 mm, demonstrating a  possible/questionable minimal subtle increase in size. Similarly the lymph nodes noted above in the supraclavicular and para-aortic regions demonstrate possible minimal possible subtle increase in size.      5/18/17: Cycle #13 Niraparib.  191.  6/15/17: Cycle #14 Niraparib.  146.  7/13/17: Cycle #15 Niraparib 200 mg.  171     CT (8/9/17):       IMPRESSION:  1. Segment 6 hepatic metastasis is stable to minimally increased in  size.  2. Slight increase in multicentric adenopathy, most pronounced at the  edmundo hepatis. Additional sites include the inferior left  neck/supraclavicular region and retroperitoneum which appear stable to  minimally increased.      8/10/17:  175  9/14/17: MUGA LVEF 54%  9/22/17: C1D1 carboplatin/Doxil.  187.  10/19/17: C2D1 carboplatin/Doxil.  108.  11/17/17:  C3D1 carboplatin/Doxil.  82.  12/14/17: C4D1 carboplatin/Doxil/avastin.  83.  1/12/18: C5D1 carboplatin/Doxil/avastin.  80.  2/9/18: C6D1 carboplatin/Doxil/avastin.  pending.    6/20/2018: CT CAP:  IMPRESSION: In this patient with history of ovarian cancer:  1. Increased size of a right hepatic lobe lesion and numerous edmundo  hepatis and retroperitoneal lymph nodes compatible with progression of  metastatic disease.  2. New mild intrahepatic biliary ductal dilatation, periportal edema,  and pericholecystic fluid. Increased soft tissue fullness in the edmundo  hepatis in the expected location of the common hepatic duct. Findings  are suspicious for developing biliary ductal obstruction secondary to  worsening metastatic disease in the edmundo hepatis. Correlation with  liver function tests is recommended. Right upper quadrant ultrasound  may be beneficial.  3.. Unchanged left supraclavicular and right hilar lymphadenopathy in  the chest.        Date Value Ref Range Status   06/20/2018 170 (H) 0 - 30 U/mL Final     Comment:     Assay Method:  Chemiluminescence using Siemens Centaur XP   03/02/2018 49 (H) 0 - 30 U/mL Final     Comment:     Assay Method:  Chemiluminescence using Siemens Centaur XP   02/09/2018 71 (H) 0 - 30 U/mL Final     Comment:     Assay Method:  Chemiluminescence using Siemens Centaur XP   01/12/2018 80 (H) 0 - 30 U/mL Final     Comment:     Assay Method:  Chemiluminescence using Siemens Centaur XP   12/12/2017 83 (H) 0 - 30 U/mL Final     Comment:     Assay Method:  Chemiluminescence using Siemens Centaur XP   11/17/2017 82 (H) 0 - 30 U/mL Final     Comment:     Assay Method:  Chemiluminescence using Siemens Centaur XP   10/20/2017 108 (H) 0 - 30 U/mL Final     Comment:     Assay Method:  Chemiluminescence using Siemens Centaur XP   09/22/2017 187 (H) 0 - 30 U/mL Final     Comment:     Assay Method:  Chemiluminescence using Siemens Centaur XP   08/10/2017 175 (H) 0 - 30  U/mL Final     Comment:     Assay Method:  Chemiluminescence using Siemens Centaur XP   07/13/2017 171 (H) 0 - 30 U/mL Final     Comment:     Assay Method:  Chemiluminescence using Siemens Centaur XP     nchanged left supraclavicular and right hilar lymphadenopathy in  the chest.    Past Medical History:   Diagnosis Date     Antiplatelet or antithrombotic long-term use      Ascites      Blood clot in the legs      Diabetes (H)      Ovarian cancer (H)     serous,stg IV     Pleural effusion      Pulmonary embolism (H) 2/2012     Refusal of blood transfusions as patient is Sabianism      Short gut syndrome      Subclinical hypothyroidism 4/18/2013     Thrombosis of leg        Past Surgical History:   Procedure Laterality Date     COLECTOMY       COLONOSCOPY  2/1/2012    Procedure:COLONOSCOPY; With Biopsy; Surgeon:TONI SULLIVAN; Location:[facility]     HYSTERECTOMY TOTAL ABD, RHIANNA SALPINGO-OOPHORECTOMY, NODE DISSECTION, TUMOR DEBULKING, COMBINED  2/1/2012    Procedure:COMBINED HYSTERECTOMY TOTAL ABDOMINAL, BILATERAL SALPINGO-OOPHORECTOMY, NODE DISSECTION, TUMOR DEBULKING;  Exploratory Laparotomy, Total Abdominal Hysterectomy, Bilateral Salpingo-Oophorectomy, appendectomy,lysis of adhesions, ileal, ascending, transverse and splenic flexure resection, ileal descending bowel renanastomosis, incidental cystotomy repair, CUSA procedure and colonoscopy ; [facility]     INSERT PORT PERITONEAL ACCESS  4/3/2012    Procedure:INSERT PORT PERITONEAL ACCESS; Intraperitoneal Port Placement (c-arm); Surgeon:SAMUEL CARRASCO; Location:[facility]     INSERT PORT PERITONEAL ACCESS  5/14/2014    Procedure: INSERT PORT PERITONEAL ACCESS;  Surgeon: Nga Yeung MD;  Location: [facility]     INSERT PORT VASCULAR ACCESS       LAPAROSCOPY DIAGNOSTIC (GYN)  5/14/2014    Procedure: LAPAROSCOPY DIAGNOSTIC (GYN);  Surgeon: Nga Yeung MD;  Location: [facility]     LAPAROTOMY EXPLORATORY Right 11/25/2015    Procedure: LAPAROTOMY EXPLORATORY;   Surgeon: Nga Yeung MD;  Location: [facility]     LAPAROTOMY, TUMOR DEBULKING, COMBINED  5/14/2014    Procedure: COMBINED LAPAROTOMY, TUMOR DEBULKING;  Surgeon: Nga Yeung MD;  Location: [facility]     REMOVE CATHETER PERITONEAL N/A 8/20/2014    Procedure: REMOVE CATHETER PERITONEAL;  Surgeon: Nga Yeung MD;  Location: [facility]     VASCULAR SURGERY      stent left iliac vein       Current Outpatient Prescriptions   Medication     Ascorbic Acid (VITAMIN C PO)     ASPIRIN PO     Calcium Carbonate-Vitamin D (CALCIUM + D PO)     Cholecalciferol (VITAMIN D PO)     cyanocobalamin (VITAMIN B12) 1000 MCG/ML injection     diphenoxylate-atropine (LOMOTIL) 2.5-0.025 MG per tablet     Ferrous Sulfate 324 (65 Fe) MG TBEC     HERBALS     iohexol (OMNIPAQUE) 140 MG/ML SOLN solution     iohexol (OMNIPAQUE) 140 MG/ML SOLN solution     LEVOTHYROXINE SODIUM PO     LORazepam (ATIVAN) 1 MG tablet     magnesium oxide (MAG-OX) 400 MG tablet     METFORMIN HCL PO     order for DME     potassium chloride (KLOR-CON) 20 MEQ packet     prochlorperazine (COMPAZINE) 10 MG tablet     VITAMIN E NATURAL PO     No current facility-administered medications for this visit.      Facility-Administered Medications Ordered in Other Visits   Medication     heparin 100 UNIT/ML injection 500 Units          No Known Allergies    Family History   Problem Relation Age of Onset     Cancer Mother 69     lung, smoker     Cancer Maternal Uncle 65     brain     Colon Cancer Maternal Aunt 80     colon       Social History     Social History     Marital status:      Spouse name: N/A     Number of children: N/A     Years of education: N/A     Occupational History     Not on file.     Social History Main Topics     Smoking status: Former Smoker     Years: 8.00     Types: Cigarettes     Quit date: 6/21/1980     Smokeless tobacco: Never Used      Comment: started at 9 yo and quit at 16 yo     Alcohol use Yes      Comment: 3x/day wine or jodi      Drug use: No     Sexual activity: Not on file     Other Topics Concern     Not on file     Social History Narrative       Review of Systems     Constitutional:  Positive for fatigue. Negative for fever, chills, weight loss, weight gain, decreased appetite, night sweats, recent stressors, height gain, height loss, post-operative complications, incisional pain, hallucinations, increased energy, hyperactivity and confused.   HENT:  Positive for tinnitus. Negative for ear pain, hearing loss, nosebleeds, trouble swallowing, hoarse voice, mouth sores, sore throat, ear discharge, tooth pain, gum tenderness, taste disturbance, smell disturbance, hearing aid, bleeding gums, dry mouth, sinus pain, sinus congestion and neck mass.    Eyes:  Negative for double vision, pain, redness, eye pain, decreased vision, eye watering, eye bulging, eye dryness, flashing lights, spots, floaters, strabismus, tunnel vision, jaundice and eye irritation.   Respiratory:   Negative for cough, hemoptysis, sputum production, shortness of breath, wheezing, sleep disturbances due to breathing, snores loudly, respiratory pain, dyspnea on exertion, cough disturbing sleep and postural dyspnea.    Cardiovascular:  Negative for chest pain, dyspnea on exertion, palpitations, orthopnea, claudication, leg swelling, fingers/toes turn blue, hypertension, hypotension, syncope, history of heart murmur, chest pain on exertion, chest pain at rest, pacemaker, few scattered varicosities, leg pain, sleep disturbances due to breathing, tachycardia, light-headedness, exercise intolerance and edema.   Gastrointestinal:  Positive for diarrhea. Negative for heartburn, nausea, vomiting, abdominal pain, constipation, blood in stool, melena, rectal pain, bloating, hemorrhoids, bowel incontinence, jaundice, rectal bleeding, coffee ground emesis and change in stool.   Genitourinary:  Negative for bladder incontinence, dysuria, urgency, hematuria, flank pain, vaginal discharge,  difficulty urinating, genital sores, dyspareunia, decreased libido, nocturia, voiding less frequently, arousal difficulty, abnormal vaginal bleeding, excessive menstruation, menstrual changes, hot flashes, vaginal dryness and postmenopausal bleeding.   Musculoskeletal:  Negative for myalgias, back pain, joint swelling, arthralgias, stiffness, muscle cramps, neck pain, bone pain, muscle weakness and fracture.   Skin:  Negative for nail changes, itching, poor wound healing, rash, hair changes, skin changes, acne, warts, poor wound healing, scarring, flaky skin, Raynaud's phenomenon, sensitivity to sunlight and skin thickening.   Neurological:  Negative for dizziness, tingling, tremors, speech change, seizures, loss of consciousness, weakness, light-headedness, numbness, headaches, disturbances in coordination, extremity numbness, memory loss, difficulty walking and paralysis.   Endo/Heme:  Negative for anemia, swollen glands and bruises/bleeds easily.   Psychiatric/Behavioral:  Negative for depression, hallucinations, memory loss, decreased concentration, mood swings and panic attacks.    Breast:  Negative for breast discharge, breast mass, breast pain and nipple retraction.   Endocrine:  Negative for altered temperature regulation, polyphagia, polydipsia, unwanted hair growth and change in facial hair.        Physical Exam:    /85  Pulse 87  Temp 97.5  F (36.4  C) (Oral)  Resp 16  Wt 64.2 kg (141 lb 9.6 oz)  SpO2 97%  BMI 23.56 kg/m2    General: Alert non-toxic appearing female in no acute distress  HEENT: Normocephalic, atraumatic; PERRLA; no scleral icterus; oropharynx pink without lesions; neck supple  Pulmonary: Lungs clear to auscultation, no increased work of breathing noted  Cardiac: Regular rate and rhythm, S1S2, no clicks, murmurs, rubs, or gallops; bilateral lower extremities without edema  GI: Normoactive bowel sounds x4 quadrants, abdomen soft, non-distended, and non-tender to palpation  without masses or organomegaly  : Not indicated  Heme: Cervical, supraclavicular, and inguinal nodes without lymphadenopathy  MSK: Moves all extremities, no obvious muscle wasting  Neuro: No gross deficits, normal gait  Skin: Appropriate color for race, warm and dry, no rashes or lesions to unclothed skin; no palmar plantar erythrodysesthesia  Psych: Pleasant and interactive, affect bright, makes appropriate eye contact, thought process linear    Labs:     6/20/18:  170.    Assessment/Plan:  1) Recurrent stage IIIC bilateral ovarian cancer: s/p 3 cycles of carboplatin/Doxil + 3 cycles carbo/doxil/avastin, CT scan worrisome for disease progression and increasing . Discussed FATE NK cell trial at length with patient and  in clinic today. Patient understands that she would not be treated until mid-August or early September. Of note, she is a [employer]. She and her  will need to make sure that NK cell infusion is ok. Will put patient on trial waitlist. Patient to call clinic if she wants to be taken off. Also offered weekly taxol.   Reviewed signs and symptoms for when she should contact the clinic or seek additional care, including but not limited to fever, chills, inability to keep down food or fluids, nausea and vomiting not controlled with antiemetics, and diarrhea leading to dehydration. Patient to contact the clinic with any questions or concerns in the interim.     -biliary duct dilatation: No jaundice, patient eating and drinking well. Will continue to monitor this, may need stent placement in future.     -labs/tests ordered: Liver US, liver function labs     -Tumor testing for PDL-1    -Will send tumor to [facility] for targeted therapies    -Will continue to look at other phase I trials, will contact patient with any additional options.     2) Genetic counseling: Testing has been performed and she is negative for mutations in BRCA1, BRCA2, EPCAM, MLH1, MSH2, MSH6, PMS2,  PTEN, and TP53 genes    3) Health maintenance issues discussed include to continue following up with PCP for non-gynecologic concerns.    4) Patient verbalized understanding of and agreement with plan    A total of 35 minutes spent with patient, over 50% spent in counseling and coordination of care.    Nga Yeung MD    Department of Ob/Gyn and Women's Health  Division of Gynecologic Oncology  [facility] [place]  [phone]    I, Jarek Young, am serving as a scribe to document services personally performed by Nga Yeung MD, based upon my observations and the provider's statements to me. All documentation has been reviewed by the aforementioned doctor prior to being entered into the official medical record.     I have reviewed the above note and agree with the scribe's notation as written.    Again, thank you for allowing me to participate in the care of your patient.      Sincerely,    Nga Yeung MD

## 2018-06-22 ENCOUNTER — CARE COORDINATION (OUTPATIENT)
Dept: ONCOLOGY | Facility: CLINIC | Age: 58
End: 2018-06-22

## 2018-06-22 ENCOUNTER — RADIANT APPOINTMENT (OUTPATIENT)
Dept: ULTRASOUND IMAGING | Facility: CLINIC | Age: 58
End: 2018-06-22
Attending: OBSTETRICS & GYNECOLOGY
Payer: COMMERCIAL

## 2018-06-22 DIAGNOSIS — C79.9 METASTATIC CANCER (H): ICD-10-CM

## 2018-06-22 DIAGNOSIS — K83.8 INTRAHEPATIC BILE DUCT DILATION: ICD-10-CM

## 2018-06-22 DIAGNOSIS — C56.1 OVARIAN CANCER, RIGHT (H): ICD-10-CM

## 2018-06-22 DIAGNOSIS — C56.2 OVARIAN CANCER, LEFT (H): ICD-10-CM

## 2018-06-22 NOTE — PROGRESS NOTES
"Care Coordinator Note  Called and reviewed result of the US with Dr Yeung, no obstruction seen will not need a stent.   Reviewed results with patient and her .   Patient  stated that she does not want to do the NK study.   \":There was another study that I was not eligible because I had Niraparib.\" Is there anyway I could get the drug anyway.\"  What  do we do next?  Reviewed with her that Dr Yeung talked about weekly Taxol was the option for chemotherapy and that she  did not need to be started right away she could start at any point\"  Also offered her that she could do nothing if she wanted.   Reviewed labs with patient.  Will get back to patient about the other trial after talking with Dr Yeung and Tobin on Monday.   .              Patient verbalized back understanding of the above information discussed.   Tammy CISNEROS RN, OCN  Care Coordinator   Gynecologic Cancer   Office [phone]  Pager [phone] #[phone]  "

## 2018-06-24 RX ORDER — ALBUTEROL SULFATE 90 UG/1
1-2 AEROSOL, METERED RESPIRATORY (INHALATION)
Status: CANCELLED
Start: 2018-09-06

## 2018-06-24 RX ORDER — ALBUTEROL SULFATE 90 UG/1
1-2 AEROSOL, METERED RESPIRATORY (INHALATION)
Status: CANCELLED
Start: 2018-08-16

## 2018-06-24 RX ORDER — MEPERIDINE HYDROCHLORIDE 25 MG/ML
25 INJECTION INTRAMUSCULAR; INTRAVENOUS; SUBCUTANEOUS EVERY 30 MIN PRN
Status: CANCELLED | OUTPATIENT
Start: 2018-08-30

## 2018-06-24 RX ORDER — EPINEPHRINE 0.3 MG/.3ML
0.3 INJECTION SUBCUTANEOUS EVERY 5 MIN PRN
Status: CANCELLED | OUTPATIENT
Start: 2018-08-16

## 2018-06-24 RX ORDER — MEPERIDINE HYDROCHLORIDE 25 MG/ML
25 INJECTION INTRAMUSCULAR; INTRAVENOUS; SUBCUTANEOUS EVERY 30 MIN PRN
Status: CANCELLED | OUTPATIENT
Start: 2018-08-22

## 2018-06-24 RX ORDER — DIPHENHYDRAMINE HCL 25 MG
25 CAPSULE ORAL ONCE
Status: CANCELLED
Start: 2018-09-06

## 2018-06-24 RX ORDER — MEPERIDINE HYDROCHLORIDE 25 MG/ML
25 INJECTION INTRAMUSCULAR; INTRAVENOUS; SUBCUTANEOUS EVERY 30 MIN PRN
Status: CANCELLED | OUTPATIENT
Start: 2018-08-02

## 2018-06-24 RX ORDER — LORAZEPAM 2 MG/ML
1 INJECTION INTRAMUSCULAR EVERY 6 HOURS PRN
Status: CANCELLED
Start: 2018-08-16

## 2018-06-24 RX ORDER — ALBUTEROL SULFATE 0.83 MG/ML
2.5 SOLUTION RESPIRATORY (INHALATION)
Status: CANCELLED | OUTPATIENT
Start: 2018-09-06

## 2018-06-24 RX ORDER — DIPHENHYDRAMINE HYDROCHLORIDE 50 MG/ML
50 INJECTION INTRAMUSCULAR; INTRAVENOUS
Status: CANCELLED
Start: 2018-08-16

## 2018-06-24 RX ORDER — METHYLPREDNISOLONE SODIUM SUCCINATE 125 MG/2ML
125 INJECTION, POWDER, LYOPHILIZED, FOR SOLUTION INTRAMUSCULAR; INTRAVENOUS
Status: CANCELLED
Start: 2018-08-22

## 2018-06-24 RX ORDER — LORAZEPAM 2 MG/ML
1 INJECTION INTRAMUSCULAR EVERY 6 HOURS PRN
Status: CANCELLED
Start: 2018-08-09

## 2018-06-24 RX ORDER — SODIUM CHLORIDE 9 MG/ML
1000 INJECTION, SOLUTION INTRAVENOUS CONTINUOUS PRN
Status: CANCELLED
Start: 2018-08-02

## 2018-06-24 RX ORDER — EPINEPHRINE 1 MG/ML
0.3 INJECTION, SOLUTION INTRAMUSCULAR; SUBCUTANEOUS EVERY 5 MIN PRN
Status: CANCELLED | OUTPATIENT
Start: 2018-08-16

## 2018-06-24 RX ORDER — DIPHENHYDRAMINE HCL 25 MG
25 CAPSULE ORAL ONCE
Status: CANCELLED
Start: 2018-08-22

## 2018-06-24 RX ORDER — EPINEPHRINE 1 MG/ML
0.3 INJECTION, SOLUTION INTRAMUSCULAR; SUBCUTANEOUS EVERY 5 MIN PRN
Status: CANCELLED | OUTPATIENT
Start: 2018-08-22

## 2018-06-24 RX ORDER — METHYLPREDNISOLONE SODIUM SUCCINATE 125 MG/2ML
125 INJECTION, POWDER, LYOPHILIZED, FOR SOLUTION INTRAMUSCULAR; INTRAVENOUS
Status: CANCELLED
Start: 2018-08-02

## 2018-06-24 RX ORDER — METHYLPREDNISOLONE SODIUM SUCCINATE 125 MG/2ML
125 INJECTION, POWDER, LYOPHILIZED, FOR SOLUTION INTRAMUSCULAR; INTRAVENOUS
Status: CANCELLED
Start: 2018-09-06

## 2018-06-24 RX ORDER — LORAZEPAM 2 MG/ML
1 INJECTION INTRAMUSCULAR EVERY 6 HOURS PRN
Status: CANCELLED
Start: 2018-08-22

## 2018-06-24 RX ORDER — DIPHENHYDRAMINE HYDROCHLORIDE 50 MG/ML
50 INJECTION INTRAMUSCULAR; INTRAVENOUS
Status: CANCELLED
Start: 2018-08-02

## 2018-06-24 RX ORDER — SODIUM CHLORIDE 9 MG/ML
1000 INJECTION, SOLUTION INTRAVENOUS CONTINUOUS PRN
Status: CANCELLED
Start: 2018-09-06

## 2018-06-24 RX ORDER — ALBUTEROL SULFATE 0.83 MG/ML
2.5 SOLUTION RESPIRATORY (INHALATION)
Status: CANCELLED | OUTPATIENT
Start: 2018-08-16

## 2018-06-24 RX ORDER — EPINEPHRINE 0.3 MG/.3ML
0.3 INJECTION SUBCUTANEOUS EVERY 5 MIN PRN
Status: CANCELLED | OUTPATIENT
Start: 2018-08-02

## 2018-06-24 RX ORDER — SODIUM CHLORIDE 9 MG/ML
1000 INJECTION, SOLUTION INTRAVENOUS CONTINUOUS PRN
Status: CANCELLED
Start: 2018-08-22

## 2018-06-24 RX ORDER — DIPHENHYDRAMINE HCL 25 MG
25 CAPSULE ORAL ONCE
Status: CANCELLED
Start: 2018-08-09

## 2018-06-24 RX ORDER — LORAZEPAM 2 MG/ML
1 INJECTION INTRAMUSCULAR EVERY 6 HOURS PRN
Status: CANCELLED
Start: 2018-09-06

## 2018-06-24 RX ORDER — EPINEPHRINE 1 MG/ML
0.3 INJECTION, SOLUTION INTRAMUSCULAR; SUBCUTANEOUS EVERY 5 MIN PRN
Status: CANCELLED | OUTPATIENT
Start: 2018-09-06

## 2018-06-24 RX ORDER — SODIUM CHLORIDE 9 MG/ML
1000 INJECTION, SOLUTION INTRAVENOUS CONTINUOUS PRN
Status: CANCELLED
Start: 2018-08-30

## 2018-06-24 RX ORDER — EPINEPHRINE 0.3 MG/.3ML
0.3 INJECTION SUBCUTANEOUS EVERY 5 MIN PRN
Status: CANCELLED | OUTPATIENT
Start: 2018-08-30

## 2018-06-24 RX ORDER — ALBUTEROL SULFATE 90 UG/1
1-2 AEROSOL, METERED RESPIRATORY (INHALATION)
Status: CANCELLED
Start: 2018-08-22

## 2018-06-24 RX ORDER — SODIUM CHLORIDE 9 MG/ML
1000 INJECTION, SOLUTION INTRAVENOUS CONTINUOUS PRN
Status: CANCELLED
Start: 2018-08-16

## 2018-06-24 RX ORDER — ALBUTEROL SULFATE 0.83 MG/ML
2.5 SOLUTION RESPIRATORY (INHALATION)
Status: CANCELLED | OUTPATIENT
Start: 2018-08-09

## 2018-06-24 RX ORDER — ALBUTEROL SULFATE 90 UG/1
1-2 AEROSOL, METERED RESPIRATORY (INHALATION)
Status: CANCELLED
Start: 2018-08-09

## 2018-06-24 RX ORDER — DIPHENHYDRAMINE HYDROCHLORIDE 50 MG/ML
50 INJECTION INTRAMUSCULAR; INTRAVENOUS
Status: CANCELLED
Start: 2018-08-09

## 2018-06-24 RX ORDER — EPINEPHRINE 1 MG/ML
0.3 INJECTION, SOLUTION INTRAMUSCULAR; SUBCUTANEOUS EVERY 5 MIN PRN
Status: CANCELLED | OUTPATIENT
Start: 2018-08-09

## 2018-06-24 RX ORDER — MEPERIDINE HYDROCHLORIDE 25 MG/ML
25 INJECTION INTRAMUSCULAR; INTRAVENOUS; SUBCUTANEOUS EVERY 30 MIN PRN
Status: CANCELLED | OUTPATIENT
Start: 2018-08-09

## 2018-06-24 RX ORDER — EPINEPHRINE 1 MG/ML
0.3 INJECTION, SOLUTION INTRAMUSCULAR; SUBCUTANEOUS EVERY 5 MIN PRN
Status: CANCELLED | OUTPATIENT
Start: 2018-08-30

## 2018-06-24 RX ORDER — EPINEPHRINE 1 MG/ML
0.3 INJECTION, SOLUTION INTRAMUSCULAR; SUBCUTANEOUS EVERY 5 MIN PRN
Status: CANCELLED | OUTPATIENT
Start: 2018-08-02

## 2018-06-24 RX ORDER — EPINEPHRINE 0.3 MG/.3ML
0.3 INJECTION SUBCUTANEOUS EVERY 5 MIN PRN
Status: CANCELLED | OUTPATIENT
Start: 2018-08-09

## 2018-06-24 RX ORDER — DIPHENHYDRAMINE HCL 25 MG
25 CAPSULE ORAL ONCE
Status: CANCELLED
Start: 2018-08-16

## 2018-06-24 RX ORDER — ALBUTEROL SULFATE 90 UG/1
1-2 AEROSOL, METERED RESPIRATORY (INHALATION)
Status: CANCELLED
Start: 2018-08-02

## 2018-06-24 RX ORDER — DIPHENHYDRAMINE HYDROCHLORIDE 50 MG/ML
50 INJECTION INTRAMUSCULAR; INTRAVENOUS
Status: CANCELLED
Start: 2018-08-22

## 2018-06-24 RX ORDER — METHYLPREDNISOLONE SODIUM SUCCINATE 125 MG/2ML
125 INJECTION, POWDER, LYOPHILIZED, FOR SOLUTION INTRAMUSCULAR; INTRAVENOUS
Status: CANCELLED
Start: 2018-08-16

## 2018-06-24 RX ORDER — MEPERIDINE HYDROCHLORIDE 25 MG/ML
25 INJECTION INTRAMUSCULAR; INTRAVENOUS; SUBCUTANEOUS EVERY 30 MIN PRN
Status: CANCELLED | OUTPATIENT
Start: 2018-08-16

## 2018-06-24 RX ORDER — ALBUTEROL SULFATE 90 UG/1
1-2 AEROSOL, METERED RESPIRATORY (INHALATION)
Status: CANCELLED
Start: 2018-08-30

## 2018-06-24 RX ORDER — SODIUM CHLORIDE 9 MG/ML
1000 INJECTION, SOLUTION INTRAVENOUS CONTINUOUS PRN
Status: CANCELLED
Start: 2018-08-09

## 2018-06-24 RX ORDER — LORAZEPAM 2 MG/ML
1 INJECTION INTRAMUSCULAR EVERY 6 HOURS PRN
Status: CANCELLED
Start: 2018-08-30

## 2018-06-24 RX ORDER — LORAZEPAM 2 MG/ML
1 INJECTION INTRAMUSCULAR EVERY 6 HOURS PRN
Status: CANCELLED
Start: 2018-08-02

## 2018-06-24 RX ORDER — DIPHENHYDRAMINE HCL 25 MG
25 CAPSULE ORAL ONCE
Status: CANCELLED
Start: 2018-08-30

## 2018-06-24 RX ORDER — MEPERIDINE HYDROCHLORIDE 25 MG/ML
25 INJECTION INTRAMUSCULAR; INTRAVENOUS; SUBCUTANEOUS EVERY 30 MIN PRN
Status: CANCELLED | OUTPATIENT
Start: 2018-09-06

## 2018-06-24 RX ORDER — ALBUTEROL SULFATE 0.83 MG/ML
2.5 SOLUTION RESPIRATORY (INHALATION)
Status: CANCELLED | OUTPATIENT
Start: 2018-08-30

## 2018-06-24 RX ORDER — ALBUTEROL SULFATE 0.83 MG/ML
2.5 SOLUTION RESPIRATORY (INHALATION)
Status: CANCELLED | OUTPATIENT
Start: 2018-08-22

## 2018-06-24 RX ORDER — DIPHENHYDRAMINE HYDROCHLORIDE 50 MG/ML
50 INJECTION INTRAMUSCULAR; INTRAVENOUS
Status: CANCELLED
Start: 2018-09-06

## 2018-06-24 RX ORDER — EPINEPHRINE 0.3 MG/.3ML
0.3 INJECTION SUBCUTANEOUS EVERY 5 MIN PRN
Status: CANCELLED | OUTPATIENT
Start: 2018-08-22

## 2018-06-24 RX ORDER — DIPHENHYDRAMINE HYDROCHLORIDE 50 MG/ML
50 INJECTION INTRAMUSCULAR; INTRAVENOUS
Status: CANCELLED
Start: 2018-08-30

## 2018-06-24 RX ORDER — METHYLPREDNISOLONE SODIUM SUCCINATE 125 MG/2ML
125 INJECTION, POWDER, LYOPHILIZED, FOR SOLUTION INTRAMUSCULAR; INTRAVENOUS
Status: CANCELLED
Start: 2018-08-09

## 2018-06-24 RX ORDER — ALBUTEROL SULFATE 0.83 MG/ML
2.5 SOLUTION RESPIRATORY (INHALATION)
Status: CANCELLED | OUTPATIENT
Start: 2018-08-02

## 2018-06-24 RX ORDER — EPINEPHRINE 0.3 MG/.3ML
0.3 INJECTION SUBCUTANEOUS EVERY 5 MIN PRN
Status: CANCELLED | OUTPATIENT
Start: 2018-09-06

## 2018-06-24 RX ORDER — DIPHENHYDRAMINE HCL 25 MG
25 CAPSULE ORAL ONCE
Status: CANCELLED
Start: 2018-08-02

## 2018-06-24 RX ORDER — METHYLPREDNISOLONE SODIUM SUCCINATE 125 MG/2ML
125 INJECTION, POWDER, LYOPHILIZED, FOR SOLUTION INTRAMUSCULAR; INTRAVENOUS
Status: CANCELLED
Start: 2018-08-30

## 2018-06-25 ENCOUNTER — MYC MEDICAL ADVICE (OUTPATIENT)
Dept: ONCOLOGY | Facility: CLINIC | Age: 58
End: 2018-06-25

## 2018-06-25 ENCOUNTER — CARE COORDINATION (OUTPATIENT)
Dept: ONCOLOGY | Facility: CLINIC | Age: 58
End: 2018-06-25

## 2018-06-25 NOTE — PROGRESS NOTES
"Care Coordinator Note  \"What ever we start I would like to wait till after a family gathering the end of July.'   \"She is wondering if what ever study she could not be one because of being on previous Parp could she get the drug anyway. \"  Discussed with Dr Yeung the drug would be Keytruda \"  \" Her tumor is being tested at [facility] and will need to wait until her PDL-1 status is back. \"  Will touch base with her the end of July      Patient verbalized back understanding of the above information discussed.   Tammy CISNEROS RN, OCN  Care Coordinator   Gynecologic Cancer   Office [phone]  Pager [phone] #[phone]  "

## 2018-07-02 ENCOUNTER — CARE COORDINATION (OUTPATIENT)
Dept: ONCOLOGY | Facility: CLINIC | Age: 58
End: 2018-07-02

## 2018-07-02 NOTE — PROGRESS NOTES
Care Coordinator Note  Follow up on the My Chart message.  The below was left as a message.   1.  According to what Dr Yeung found regarding the [employer] study she would not be eligible because has received 6 lines of treatment.  2. Regarding restarting another Parp or the same parp, the  efficacy is unknown.  3. She still feels that her best option at this time is the weekly Taxol that was discussed at the last clinic visit.       Tammy CISNEROS RN, OCN  Care Coordinator   Gynecologic Cancer   Office [phone]  Pager [phone] #[phone]

## 2018-07-03 PROCEDURE — 40000929 ZZHCL STATISTIC FOUNDATION ONE GENE PANEL: Performed by: OBSTETRICS & GYNECOLOGY

## 2018-07-03 NOTE — TELEPHONE ENCOUNTER
7/2  MD reviewed the study pt was questioning  She is not eligible due to the amount of chemo she has had  MD does not know about restarting one of parp inhibitors since she has progressed on those earlier.  md would suggest the current weekly chemo that was discussed at pt recent visit.  Will talk further on the parp in the future    Tammy chemo nurse will contact pt to give her thsi information

## 2018-07-10 LAB — LAB SCANNED RESULT: NORMAL

## 2018-07-13 DIAGNOSIS — C56.1 OVARIAN CANCER, RIGHT (H): ICD-10-CM

## 2018-07-13 DIAGNOSIS — D64.9 ANEMIA, UNSPECIFIED TYPE: ICD-10-CM

## 2018-07-13 DIAGNOSIS — C56.2 OVARIAN CANCER, LEFT (H): ICD-10-CM

## 2018-07-16 RX ORDER — FERROUS SULFATE 324(65)MG
TABLET, DELAYED RELEASE (ENTERIC COATED) ORAL
Qty: 90 TABLET | Refills: 0 | Status: SHIPPED | OUTPATIENT
Start: 2018-07-16 | End: 2018-08-14

## 2018-07-19 ENCOUNTER — TRANSFERRED RECORDS (OUTPATIENT)
Dept: HEALTH INFORMATION MANAGEMENT | Facility: CLINIC | Age: 58
End: 2018-07-19

## 2018-07-25 ENCOUNTER — CARE COORDINATION (OUTPATIENT)
Dept: ONCOLOGY | Facility: CLINIC | Age: 58
End: 2018-07-25

## 2018-07-25 LAB — LAB SCANNED RESULT: NORMAL

## 2018-07-25 NOTE — PROGRESS NOTES
Reviewed with patient at the June appt.  Tammy CISNEROS RN, OCN  Care Coordinator   Gynecologic Cancer   Office [phone]  Pager [phone] #[phone]

## 2018-07-25 NOTE — PROGRESS NOTES
Care Coordinator Note  Would like to start the weekly Taxol starting next week at [facility] on Thursdays that works well.  Her summer activities are are pretty much completed.   Will need to do the Taxol on a wednedayon 8/22 because she is leaving town on the 23rd(Thursday.)  Request sent to [facility].   Tammy        Patient verbalized back understanding of the above information discussed.   Tammy CISNEROS RN, OCN  Care Coordinator   Gynecologic Cancer   Office [phone]  Pager [phone] #[phone]

## 2018-07-25 NOTE — PROGRESS NOTES
Reviewed with patient at her June appt by Dr Yeung and a copy was given to her.  Tammy CISNEROS RN, OCN  Care Coordinator   Gynecologic Cancer   Office [phone]  Pager [phone] #[phone]  .

## 2018-08-02 RX ORDER — LORAZEPAM 1 MG/1
1 TABLET ORAL EVERY 6 HOURS PRN
Qty: 30 TABLET | Refills: 2 | Status: SHIPPED | OUTPATIENT
Start: 2018-08-02 | End: 2018-08-03

## 2018-08-02 RX ORDER — PROCHLORPERAZINE MALEATE 10 MG
10 TABLET ORAL EVERY 6 HOURS PRN
Qty: 30 TABLET | Refills: 2 | Status: SHIPPED | OUTPATIENT
Start: 2018-08-02 | End: 2018-10-23

## 2018-08-02 ASSESSMENT — ENCOUNTER SYMPTOMS
SORE THROAT: 0
NERVOUS/ANXIOUS: 0
LEG SWELLING: 0
BREAST MASS: 0
WEAKNESS: 0
FLANK PAIN: 0
JAUNDICE: 0
POSTURAL DYSPNEA: 0
TACHYCARDIA: 0
NUMBNESS: 0
VOMITING: 0
ALTERED TEMPERATURE REGULATION: 0
TINGLING: 0
RESPIRATORY PAIN: 0
NAIL CHANGES: 0
ORTHOPNEA: 0
HEARTBURN: 0
WEIGHT LOSS: 0
POOR WOUND HEALING: 0
HEADACHES: 0
COUGH: 0
HYPERTENSION: 0
JOINT SWELLING: 0
DYSPNEA ON EXERTION: 0
HOARSE VOICE: 0
PANIC: 0
ABDOMINAL PAIN: 0
SYNCOPE: 0
INCREASED ENERGY: 0
DEPRESSION: 0
SWOLLEN GLANDS: 0
EYE REDNESS: 0
NIGHT SWEATS: 0
SEIZURES: 0
MYALGIAS: 0
EYE PAIN: 0
HYPOTENSION: 0
CLAUDICATION: 0
HEMOPTYSIS: 0
HOT FLASHES: 0
SINUS CONGESTION: 0
RECTAL BLEEDING: 0
DIZZINESS: 0
MEMORY LOSS: 0
SLEEP DISTURBANCES DUE TO BREATHING: 0
MUSCLE CRAMPS: 0
SNORES LOUDLY: 0
NECK PAIN: 0
PARALYSIS: 0
BOWEL INCONTINENCE: 0
WHEEZING: 0
NAUSEA: 0
INSOMNIA: 0
WEIGHT GAIN: 0
SKIN CHANGES: 0
MUSCLE WEAKNESS: 0
CONSTIPATION: 0
HALLUCINATIONS: 0
COUGH DISTURBING SLEEP: 0
EXTREMITY NUMBNESS: 0
SPUTUM PRODUCTION: 0
DIARRHEA: 1
LOSS OF CONSCIOUSNESS: 0
PALPITATIONS: 0
EYE IRRITATION: 0
TROUBLE SWALLOWING: 0
SINUS PAIN: 0
TASTE DISTURBANCE: 0
DECREASED APPETITE: 0
HEMATURIA: 0
LIGHT-HEADEDNESS: 0
DECREASED LIBIDO: 0
EYE WATERING: 0
POLYPHAGIA: 0
ARTHRALGIAS: 0
DYSURIA: 0
LEG PAIN: 0
BRUISES/BLEEDS EASILY: 0
DIFFICULTY URINATING: 0
CHILLS: 0
BLOATING: 0
FEVER: 0
NECK MASS: 0
TREMORS: 0
RECTAL PAIN: 0
BLOOD IN STOOL: 0
SHORTNESS OF BREATH: 0
DECREASED CONCENTRATION: 0
STIFFNESS: 0
SPEECH CHANGE: 0
DISTURBANCES IN COORDINATION: 0
EXERCISE INTOLERANCE: 0
BACK PAIN: 0
BREAST PAIN: 0
DOUBLE VISION: 0
POLYDIPSIA: 0
SMELL DISTURBANCE: 0

## 2018-08-02 NOTE — PROGRESS NOTES
Gynecologic Oncology Follow-Up Note  RE: Odalys Nathan  MRN: [id number]  : 1960  Date of Visit: 2018    CC: Odalys Nathan is a 57 year old year old female with recurrent stage IIIC bilateral ovarian cancer who presents today for disease management with Doxil.    HPI: Odalys comes to the clinic accompanied by her  Marcos. She is feeling well and in her usual state of health. Continues with chronic diarrhea, denies need for intervention. Reports previously tolerating Taxol fairly well with minimal side effects other than mild neuropathy. Takes magnesium once daily when she feels she can tolerate it- develops diarrhea with this. She continues on ferrous sulfate 325mg TID for history of anemia. She also continues tinnitus- denies need for intervention. Has had many cycles of carboplatin, no history of reaction thus far. Denies fevers, chills, RUQ pain, jaundice, or vomiting. She is eating well, currently drinking well, reports she is ready for chemotherapy today.    Brief Oncology History:  2012 - Admitted to hospital for 2 weeks of intermittent abdominal cramping, distention, diarrhea and N/V. CT of abdomen/pelvis significant for small bowel obstruction, a heterogenous soft tissue density in the pelvis, omental nodules, and ascites. Bilateral adnexal masses per U/S of pelvis. CA-125 was elevated at 987, CEA was normal at 1.0.    12 - Therapeutic paracentesis (4 L) with cytology confirming malignancy (MI positive, weak ER positive, CK-7 positive consistent with GYN primary). Surgery recommended d/t potential for falling blood counts 2/2 chemotherapy (patient is [employer] and limiting blood transfusion)    12 - Exploratory laparotomy, MAR BSO, lysis of adhesion, Appendectomy, Repair cystotomy, Omentectomy Fydb-mnsudu-disdmpqae-transverse-colon resection, Ileo-descending colon anastomosis, CUSA, & Colonoscopy (done by Dr. Amato and colorectal team).  2012 -  Admitted to hospital for bilateral pulmonary emboli and drainage of pleural effusion. Started on Lovenox.  2/28/12-3/21/12: Cycle #1-2 Carbo/Taxol IV  3/29/12 - Started on Keflex by her PCP for infection in her healing wound (immediately below her umbilicus).    4/11/12-6/14/12: Cycle #3-6 IV chemo, Cycle #1 IV/IP chemo  7/16/12 -  8, CT RPADEEP - enrolled in  - observation arm  3/4/13-6/28/13:  6, 9, 12, 15, 20.  7/1/13: CT Chest, Abdomen, Pelvis IMPRESSION:  1. Worsening metastatic ovarian carcinoma suggested by increased size of soft tissue nodules anterior to the right psoas muscle, that may represent growing mesenteric lymphadenopathy.  2. Remaining prominent right lower quadrant mesenteric lymph nodes are not significantly changed from CT 7/16/2012.  3. Clustered nodular opacities in the right lower lobe are not significant change from 7/16/2012 and remain indeterminate. Again, the appearance and distribution is suggestive of an infectious etiology.  4. Stable 8 mm soft tissue nodule in left breast, unchanged since at least 02/20/2012. This can be observed on followup studies, but correlation with mammography could be considered.  Decision made to start Doxil/Carbo.  7/11/13: The left ventricular ejection fraction is normal at 66.4%.  7/12/13-9/6/13: Cycle #1-3 Doxil/Carbo.  10, 11.    9/30/13 CT C/A/P Impression:  1. Overall, favorable response to treatment with decreasing size of soft tissue nodules tracking along the anterior aspect of the right psoas muscle.  2. 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  3. Improved cluster of right lower lobe pulmonary nodular opacities. These may represent resolving infection.  10/3/13-12/9/13:  9, 8, 10. Cycle 4-6 Doxil/Carbo.    1/13/14 CT C/A/P Impression:   1. Stable appearance of metastatic ovarian cancer. Scattered soft tissue nodules along the anterior aspect of the right psoas muscle are unchanged in size. Mild mesenteric  lymphadenopathy is unchanged.  2. Clustered micronodules in the right lower lobe are unchanged from 9/30/2013, but improved from 7/1/2013. This history suggests a postinflammatory/postinfectious etiology.  3. 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  1/16/14 Discussed multiple options for her based on relatively stable-appearing disease on CT but slight increase in  (which has had small increase to 20 with last recurrence) including chemo break with recheck of  in 1 month, starting new chemo agent immediately, and exploratory surgery with possible resection of nodules. She is considered platinum-sensitive based on > 1 year remission after Taxol/Carbo, which we will take into consideration for future chemo planning. I am not inclined to surgery at this time given difficulty she already has with diarrhea secondary to past colon resection. I suspect we would need to resect further bowel due to mesenteric disease. Also explained inherent risks of any major surgery. Also mentioned maintenance chemo, but this has not been shown to increase overall survival and would likely decrease her quality of life without significant benefit. Family is going on vacation to [place] in 2 weeks and Odalys does not want to have chemo prior to that, so will plan to take 1 month break. She can have  at that time (discussed checking toady since last draw was in early December, but as it would likely not change treatment plan and she has h/o slow rising , will not check today).  2/17/14  16    2/20/14: Decision to take break from chemo for two months, followed by CT and CA-125.    4/21/14  27  4/21/14 CT C/A/P Impression:    1. Increased size of 2 low-attenuation lymph nodes anterior to the right psoas muscle is concerning for worsening metastatic ovarian cancer.    2. New circumferential thickening of a 3.8 cm length segment of distal transverse colon is likely physiologic. Recommend attention on  "followup imaging.    3. Grossly unchanged size of clustered small nodules versus scarring in the right lower lobe the lungs.    4. Stable thrombosis of the right ovarian vein.  5/14/14: Diagnostic laparoscopy converted to exploratory laparotomy and removal of mesenteric masses, tumor debulking, peritoneal biopsies and intraperitoneal port placement. On laparoscopy, it was noted that there were small nodules on the anterior abdominal wall near the previous incision, small nodules on the right pelvic sidewall as well. Nodules were palpated in the mesentery; however, as it was unable to clarify where the origin of the nodules was, the decision was made to open the patient. On opening there was found to be approximately a 3 cm nodule in the small bowel mesentery and another separate approximately 2 cm nodule in the bowel mesentery. Pelvis without evidence of cancer, some mesenteric lymph nodes were palpated. No evidence otherwise of any disseminated cancer throughout the abdomen.    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 (2/2)  -Largest metastasis: 1.3 cm  -See comment  D: Peritoneum, right paracolic gutter #2, biopsy:  -Fibroadipose tissue with granulomatous inflammation surrounding refractile material  -Negative for malignancy   E: Small bowel adhesion, biopsy:  -Fibroconnective tissue, consistent with adhesion  -Negative for malignancy  F: Lymph nodes, mesentry, not otherwise specified, excision:  -Two lymph nodes, negative for metastatic carcinoma (0/2)  G: Lymph node, mesentery, \"#2\", excision:  -One lymph node, negative for metastatic carcinoma (0/1)  H: Lymph " "nodes, mesentery, \"nodule #2\", excision:  -Five lymph nodes, negative for metastatic carcinoma (0/5)  COMMENT:  Some of the specimens show post-operative changes. Others show possible treatment related changes, including necrosis. The metastatic carcinoma in the mesenteric lymph nodes (specimen C) shows variable morphology, including relatively low grade tumor with papillary architecture, and high grade tumor comprised of nests of tumor cells with irregular, slit-like spaces and marked nuclear pleomorphism.    5/29/14: Cycle 1 IV PACLitaxel / IP CISplatin / IP PACLitaxel.  - 28.  6/26/14: Cycle #2 IV/IP.  10  8/5/14: CT chest/abd/pelvis IMPRESSION     1. In this patient with ovarian cancer, overall findings are indicative of stable/slight improvement, as multiple mesenteric lymphadenopathy and scattered nodular peritoneal soft tissue mass lesions appear unchanged or slightly smaller since 4/21/2014.    2. 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 Mild dilatation of the second and the third portion of the duodenum with a narrow SMA angle. This could represent SMA syndrome, if clinically correlated.17/14:    Cycle #3 IV/IP.  10.    CT chest/abd/pelvis with contrast on 8/5/14    Impression:    1. In this patient with ovarian cancer, overall findings are indicative of stable/slight improvement, as multiple mesenteric lymphadenopathy and scattered nodular peritoneal soft tissue mass lesions appear unchanged or slightly smaller since 4/21/2014.    2. 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 Mild dilatation of the second and the third portion of the duodenum with a narrow SMA angle. This could represent SMA syndrome, if clinically correlated.  8/7/14: Cycle #4 Taxol/Carbo (changed from IV/IP).  10. She has been feeling okay. She is unsure if she can finish out the course of 6 cycles IV/IP taxol/cisplatin. She feels like she has the flu for about a week then " starts feeling gradually better after each chemo cycle. Her spouse notes that she actually has been more sick with the treatments than she initially admits here. She was also previously having some rib pain. Denies any rib pain now. Denies any chest pain or shortness of breath.  Plan: discussed recent CT cap results and switching to just IV as she is feeling miserable with IP treatments. Switch to IV carbo/taxol as patient is platinum sensitive.  We also discussed her taking part of the tesaro trial, which would require BRCA testing. She would like to take part in this trial if eligible.  8/20/14: Remove Intraperitoneal Port ( Port and catheter intact - discarded)  8/28/14: Cycle #5 Taxol/Carbo held due to thrombocytopenia.  6.    She denies any vaginal bleeding, no changes in her bowel or bladder habits, no nausea/emesis, no lower extremity edema, and no difficulties eating or sleeping. She denies any abdominal discomfort/bloating, no fevers or chills, and no chest pain or shortness of breath. She states her diarrhea is the same. She reports some fatigue which improves about 1-2 weeks after her chemotherapy. She states she does not need any medication refills and she was told she does not meet the criteria for the TESARO trial. She states she has 3 bags of iv fluids left over from her previous chemotherapy and will give these to herself. She states she is ready for her treatment today.    9/29/14: Cycle #6 Taxol/Carbo  6. Insurance questions regarding GSF coverage today. No concerns other than fatigue. Taking iron for anemia and does not desire blood transfusion. Using neulasta for neutropenia. Using home IV hydration if needed. Baseline unchanged. No abdominal bloating, constipation, diarrhea, pain, vaginal or rectal bleeding, cough or dyspnea, fluid retention.    10/16/14: Impression:    1. Nodular peritoneal soft tissue mass in the right lower quadrant adjacent to the psoas muscle is no longer  appreciated. Adjacent prominent lymphadenopathy is unchanged from previous exam. No new peritoneal lesions.    2. 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    10/20/14:  5. CT chest/abdomen/pelvis on 10/16/14 showed nodular peritoneal soft tissue mass in the right lower quadrant adjacent to the psoas muscle is no longer appreciated. Adjacent prominent lymphadenopathy is unchanged from previous exam. 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  1/27/15:  6.  4/28/15:  14.  5/26/15:  18.  6/2/15: CT cap Impression:  1. Postsurgical changes of hysterectomy and bilateral salpingo-oophorectomy for ovarian cancer. There is a new 8 mm hazy, ill-defined hypoattenuating lesion in hepatic segment 6 which is suspicious for a metastatic deposit. Further evaluation with ultrasound in recommended.    2. Increased size of a left retroperitoneal lymph node which is indeterminate but may represent a ciro metastasis. Mildly prominent lymph nodes in the right lower quadrant are not significantly changed.  3. Moderate colonic stool burden.    6/4/15: US abdomen IMPRESSION:    Hyperechoic lesion in the right hepatic lobe, consistent with hemangioma. This does not corresponds to the area of the lesion seen on CT from 6/2/2015. An MR would be helpful for identifying and characterizing the lesion from the recent CT.  6/12/15: MR abd IMPRESSION:  1. New 20 x 11 mm enhancing lesions between the right obliques, concerning for metastatic disease. This lesions should be amenable to percutaneous biopsy, if indicated.  2. Correlating to the lesion visualized on comparison CT is a hepatic segment 6 subcapsular 7 mm lesion. Overall the appearance favors the diagnosis of a simple cyst. However, there is faint suggestion of mild peripheral arterial enhancement. Although this is favored as  artifactual, this should be followed up to confirm stability. Recommend 6 month  followup.  3. Hepatic segment 6, 5 mm lesion too small to technically characterize. Differential would favor FNH, less likely flash filling hemangioma. Recommend attention on followup.  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     9/1/15: Cycle #1 Avastin/Cytoxan.   31.     9/24/15:feeling generally well. She says she has been having back and stomach spasms. She is taking cytosine daily (she ran out yesterday). She also says its affecting her voice. Admits that it burns occasionally. She is eating and drinking normal. She also admit diarrhea, 5-7 times daily, lose/watery. She trying to stay hydrated and eat fiber. She also says that her body is sore, especially the bottom of her feet. Her blood pressure is normal. She also admits having headache after her first infusion.      9/24/15: Cycle #2 Avastin/Cytoxan.  19.  10/15/15: Cycle #3 Avastin/Cytoxan.  16.     11/6/15: CT c/a/p IMPRESSION:    1. Stable postoperative change of MAR/BSO for ovarian cancer.  2. The lesion in the right flank abdominal musculature is slightly decreased in size. Otherwise, stable examination.  2. No evidence of metastatic disease in the chest.    11/20/15: Treatment planning visit,  16    11/25/15: surgical pathology report  FINAL DIAGNOSIS:  Soft tissue, right oblique muscle mass, excision:  -Recurrent ovarian serous carcinoma  -Carcinoma is present less than 1 mm from one resection margin  -Background skeletal muscle and fibroadipose tissue    1/4/16:  22  1/11/16-1/27/16: Radiation to right flank x 12 treatments  4/7/2016:  94. CT cap IMPRESSION:    In this patient with ovarian cancer status post MAR/BSO and descending/transverse colectomy:  1. No evidence for malignancy in the chest, abdomen, or pelvis.  2. Stable small hypodense segment 6 liver lesion, appears more  likely benign, possibly a cyst.  5/13/16:  124.  6/3/16: PET CT IMPRESSION: In this patient with a history of ovarian cancer:  1. Hypermetabolic and enlarging periaortic and perihepatic lymphadenopathy compatible with metastatic disease, as detailed above.  2. Although hypodense lesion in hepatic segment 6 has been present since 6/2/2015 associated hypermetabolism makes this lesion highly concerning for metastatic disease.    Plan: to start Niraparib under TESARO study.     6/9/16:  137.  6/14/16: Cycle #1 Niraparib.    6/28/16: Cycle #1 D15 Niraparib.    7/5/16:  100.    7/11/16: Cycle #2 Nraparib.   83.    8/3/2016: PET CT IMPRESSION:    In this patient with known history of ovarian cancer:  1) New pleural based nodular opacities in the lateral and inferior aspects of the bilateral lower lobes, worse in the left lung. Likely infection. Close follow up is recommended.      2) Slight decrease in hypermetabolic abdominal lymphadenopathy. 2 hypermetabolic lymph nodes persist.  3) Unchanged right hepatic lobe metastatic lesion.     8/9/16: Cycle #3 Niraparib.  69.  9/6/16: Cycle #4 Niraparib.  53. Dose held due to anemia.  9/13/16: Eval for potential cycle 4 niraparib. Dose held due to anemia.  10/4/16: CT CAP impression:  IMPRESSION: In this patient with a known history of ovarian cancer:  1. There has been interval resolution of pleural-based nodular  opacities which likely represented infection.  2. Abdominal lymphadenopathy in the form of 2 portacaval lymph nodes  have not significantly changed in size, noted to be hypermetabolic on  prior PET/CT.  3. Previously demonstrated metastatic lesion in the right lobe of the  liver is not significantly changed.  10/11/16:  60  11/1/16: C6 niraparib,  75  11/29/16: C7 niraparib.  78. CT CAP impression as follows:  Target lesions (RECIST criteria):       A previously described target lesion superior to the head of  the  pancreas (series 2, image 64)  (referred to as a perihepatic node on  6/3/2016) may not be a valid target lesion because it measured less  than 1.5 cm originally. However, this particular node has decreased in  size, now measuring 7 mm in short axis versus 14 mm on 6/3/2016 when  measured in a similar fashion.       2.3 cm short axis portacaval lymph node on series 2 image 67,  previously 2.0 cm on 10/4/2016.       1.2 cm subtle hypodensity in hepatic segment 6 on series 2 image 75,  stable on multiple studies since at least 6/3/2016     Sum of diameters today: 3.5 cm. Sum of diameters 10/4/2016: 3.2 cm.  Growth = 9%.    12/27/16: C8 niraparib.  105.  1/25/17: C9 niraparib.  108.  2/23/17: C10 niraparib.  132.   CT CAP impression:  Sum of target lesion diameters today: 3.7 cm. Sum of target lesion  diameters on 11/28/2016: 3.5 cm. Growth= 6%  1. In this patient with history of ovarian cancer there is stable  disease by RECIST criteria as evidenced by:   1a. Mildly increased size of liver metastasis.  1b. Stable portacaval lymphadenopathy.  1c. No evidence of metastatic disease in the chest.  2. Trace emphysematous changes of the lungs.  3/22/17: C11 niraparib.  132.  4/19/17: C12 niraparib.  127.  5/16/17: CT CAP IMPRESSION: In this patient with ovarian cancer:  1. Mildly increased size of hepatic metastasis segment 6 with subtle increased capsular retraction.  2. Stable edmundo hepatis nodes, with mild increase in size of periaortic lymph nodes.  3. Prominent left supraclavicular lymph node with subtle increase in size compared to prior studies, particularly comparing to 10/4/2016.  4. Mild subtle groundglass opacities in the right upper lobe, not present on prior study, lesser extent in the right lower lobe.  Findings may represent infection, additional consideration is malignancy (less likely), and attention on follow-up study  Recommended.  Addendum:   Prominent left  supraclavicular lymph node (3/25) is stable from most recent CT performed 2/20/2017, currently measuring 14 x 16 mm, previously 14 x 16 mm on 2/20/2017.      The portal caval lymph node/edmundo hepatis lymph node is stable from 2/20/2017, measuring 21 mm.      Clarification of size of the para-aortic lymph node (series 3 image 327). It short axis measurement is 11 mm versus 9 mm on prior study.      Hepatic segment 6 triangular-shaped low density lesion (3/362) measures 14 mm, previously measured 12 mm, demonstrating a  possible/questionable minimal subtle increase in size. Similarly the lymph nodes noted above in the supraclavicular and para-aortic regions demonstrate possible minimal possible subtle increase in size.      5/18/17: Cycle #13 Niraparib.  191.  6/15/17: Cycle #14 Niraparib.  146.  7/13/17: Cycle #15 Niraparib 200 mg.  171     CT (8/9/17):       IMPRESSION:  1. Segment 6 hepatic metastasis is stable to minimally increased in  size.  2. Slight increase in multicentric adenopathy, most pronounced at the  edmundo hepatis. Additional sites include the inferior left  neck/supraclavicular region and retroperitoneum which appear stable to  minimally increased.      8/10/17:  175  9/14/17: MUGA LVEF 54%  9/22/17: C1D1 carboplatin/Doxil.  187.  10/19/17: C2D1 carboplatin/Doxil.  108.  11/17/17: C3D1 carboplatin/Doxil.  82.  12/14/17: C4D1 carboplatin/Doxil.  83.  1/12/18: C5D1 carboplatin/Doxil.  80.  2/9/18: C6D1 carboplatin/Doxil.  71.  3/2/18:  49. Three month treatment break.  6/20/2018:  170. CT CAP:  IMPRESSION: In this patient with history of ovarian cancer:  1. Increased size of a right hepatic lobe lesion and numerous edmundo  hepatis and retroperitoneal lymph nodes compatible with progression of  metastatic disease.  2. New mild intrahepatic biliary ductal dilatation, periportal edema,  and pericholecystic fluid. Increased soft tissue  fullness in the edmundo  hepatis in the expected location of the common hepatic duct. Findings  are suspicious for developing biliary ductal obstruction secondary to  worsening metastatic disease in the edmundo hepatis. Correlation with  liver function tests is recommended. Right upper quadrant ultrasound  may be beneficial.  3. Unchanged left supraclavicular and right hilar lymphadenopathy in  the chest.     8/3/18: C1D1 weekly paclitaxel.  pending.    Past Medical History:   Diagnosis Date     Antiplatelet or antithrombotic long-term use      Ascites      Blood clot in the legs      Diabetes (H)      Ovarian cancer (H)     serous,stg IV     Pleural effusion      Pulmonary embolism (H) 2/2012     Refusal of blood transfusions as patient is Taoism      Short gut syndrome      Subclinical hypothyroidism 4/18/2013     Thrombosis of leg        Past Surgical History:   Procedure Laterality Date     COLECTOMY       COLONOSCOPY  2/1/2012    Procedure:COLONOSCOPY; With Biopsy; Surgeon:TONI SULLIVAN; Location:[facility]     HYSTERECTOMY TOTAL ABD, RHIANNA SALPINGO-OOPHORECTOMY, NODE DISSECTION, TUMOR DEBULKING, COMBINED  2/1/2012    Procedure:COMBINED HYSTERECTOMY TOTAL ABDOMINAL, BILATERAL SALPINGO-OOPHORECTOMY, NODE DISSECTION, TUMOR DEBULKING;  Exploratory Laparotomy, Total Abdominal Hysterectomy, Bilateral Salpingo-Oophorectomy, appendectomy,lysis of adhesions, ileal, ascending, transverse and splenic flexure resection, ileal descending bowel renanastomosis, incidental cystotomy repair, CUSA procedure and colonoscopy ; [facility]     INSERT PORT PERITONEAL ACCESS  4/3/2012    Procedure:INSERT PORT PERITONEAL ACCESS; Intraperitoneal Port Placement (c-arm); Surgeon:SAMUEL CARRASCO; Location:[facility]     INSERT PORT PERITONEAL ACCESS  5/14/2014    Procedure: INSERT PORT PERITONEAL ACCESS;  Surgeon: Nga Yeung MD;  Location: [facility] OR     INSERT PORT VASCULAR ACCESS       LAPAROSCOPY DIAGNOSTIC (GYN)  5/14/2014     Procedure: LAPAROSCOPY DIAGNOSTIC (GYN);  Surgeon: Nga Yeung MD;  Location: [facility]     LAPAROTOMY EXPLORATORY Right 11/25/2015    Procedure: LAPAROTOMY EXPLORATORY;  Surgeon: Nga Yeung MD;  Location: [facility]     LAPAROTOMY, TUMOR DEBULKING, COMBINED  5/14/2014    Procedure: COMBINED LAPAROTOMY, TUMOR DEBULKING;  Surgeon: Nga Yeung MD;  Location: [facility]     REMOVE CATHETER PERITONEAL N/A 8/20/2014    Procedure: REMOVE CATHETER PERITONEAL;  Surgeon: Nga Yeung MD;  Location: [facility] OR     VASCULAR SURGERY      stent left iliac vein       Current Outpatient Prescriptions   Medication     Ascorbic Acid (VITAMIN C PO)     Calcium Carbonate-Vitamin D (CALCIUM + D PO)     Cholecalciferol (VITAMIN D PO)     cyanocobalamin (VITAMIN B12) 1000 MCG/ML injection     diphenoxylate-atropine (LOMOTIL) 2.5-0.025 MG per tablet     Ferrous Sulfate 324 (65 Fe) MG TBEC     HERBALS     iohexol (OMNIPAQUE) 140 MG/ML SOLN solution     iohexol (OMNIPAQUE) 140 MG/ML SOLN solution     LEVOTHYROXINE SODIUM PO     LORazepam (ATIVAN) 1 MG tablet     magnesium oxide (MAG-OX) 400 MG tablet     order for DME     potassium chloride (KLOR-CON) 20 MEQ packet     prochlorperazine (COMPAZINE) 10 MG tablet     VITAMIN E NATURAL PO     No current facility-administered medications for this visit.      Facility-Administered Medications Ordered in Other Visits   Medication     heparin 100 UNIT/ML injection 500 Units          No Known Allergies    Family History   Problem Relation Age of Onset     Cancer Mother 69     lung, smoker     Cancer Maternal Uncle 65     brain     Colon Cancer Maternal Aunt 80     colon       Social History     Social History     Marital status:      Spouse name: N/A     Number of children: N/A     Years of education: N/A     Occupational History     Not on file.     Social History Main Topics     Smoking status: Former Smoker     Years: 8.00     Types: Cigarettes     Quit date: 6/21/1980      Smokeless tobacco: Never Used      Comment: started at 9 yo and quit at 16 yo     Alcohol use Yes      Comment: 3x/day wine or jodi     Drug use: No     Sexual activity: Not on file     Other Topics Concern     Not on file     Social History Narrative       Review of Systems     Constitutional:  Negative for fever, chills, weight loss, weight gain, fatigue, decreased appetite, night sweats, recent stressors, height gain, height loss, post-operative complications, incisional pain, hallucinations, increased energy, hyperactivity and confused.   HENT:  Negative for ear pain, hearing loss, tinnitus, nosebleeds, trouble swallowing, hoarse voice, mouth sores, sore throat, ear discharge, tooth pain, gum tenderness, taste disturbance, smell disturbance, hearing aid, bleeding gums, dry mouth, sinus pain, sinus congestion and neck mass.    Eyes:  Negative for double vision, pain, redness, eye pain, decreased vision, eye watering, eye bulging, eye dryness, flashing lights, spots, floaters, strabismus, tunnel vision, jaundice and eye irritation.   Respiratory:   Negative for cough, hemoptysis, sputum production, shortness of breath, wheezing, sleep disturbances due to breathing, snores loudly, respiratory pain, dyspnea on exertion, cough disturbing sleep and postural dyspnea.    Cardiovascular:  Negative for chest pain, dyspnea on exertion, palpitations, orthopnea, claudication, leg swelling, fingers/toes turn blue, hypertension, hypotension, syncope, history of heart murmur, chest pain on exertion, chest pain at rest, pacemaker, few scattered varicosities, leg pain, sleep disturbances due to breathing, tachycardia, light-headedness, exercise intolerance and edema.   Gastrointestinal:  Positive for diarrhea. Negative for heartburn, nausea, vomiting, abdominal pain, constipation, blood in stool, melena, rectal pain, bloating, hemorrhoids, bowel incontinence, jaundice, rectal bleeding, coffee ground emesis and change in stool.    Genitourinary:  Negative for bladder incontinence, dysuria, urgency, hematuria, flank pain, vaginal discharge, difficulty urinating, genital sores, dyspareunia, decreased libido, nocturia, voiding less frequently, arousal difficulty, abnormal vaginal bleeding, excessive menstruation, menstrual changes, hot flashes, vaginal dryness and postmenopausal bleeding.   Musculoskeletal:  Negative for myalgias, back pain, joint swelling, arthralgias, stiffness, muscle cramps, neck pain, bone pain, muscle weakness and fracture.   Skin:  Negative for nail changes, itching, poor wound healing, rash, hair changes, skin changes, acne, warts, poor wound healing, scarring, flaky skin, Raynaud's phenomenon, sensitivity to sunlight and skin thickening.   Neurological:  Negative for dizziness, tingling, tremors, speech change, seizures, loss of consciousness, weakness, light-headedness, numbness, headaches, disturbances in coordination, extremity numbness, memory loss, difficulty walking and paralysis.   Endo/Heme:  Negative for anemia, swollen glands and bruises/bleeds easily.   Psychiatric/Behavioral:  Negative for depression, hallucinations, memory loss, decreased concentration, mood swings and panic attacks.    Breast:  Negative for breast discharge, breast mass, breast pain and nipple retraction.   Endocrine:  Negative for altered temperature regulation, polyphagia, polydipsia, unwanted hair growth and change in facial hair.        Physical Exam:    /69 (BP Location: Right arm, Patient Position: Sitting, Cuff Size: Adult Regular)  Pulse 80  Temp 97.8  F (36.6  C) (Oral)  Resp 16  Wt 64.1 kg (141 lb 6.4 oz)  SpO2 99%  BMI 23.53 kg/m2    General: Alert non-toxic appearing female in no acute distress  HEENT: Normocephalic, atraumatic; PERRLA; no scleral icterus; anicteric sclera; oropharynx pink without lesions; neck supple  Pulmonary: Lungs clear to auscultation, no increased work of breathing noted  Cardiac: Regular  rate and rhythm, S1S2, no clicks, murmurs, rubs, or gallops; bilateral lower extremities without edema  GI: Normoactive bowel sounds x4 quadrants, abdomen soft, non-distended, and non-tender to palpation without masses or organomegaly; Najera's sign negative  : Not indicated  Heme: Cervical, supraclavicular, and inguinal nodes without lymphadenopathy  MSK: Moves all extremities, no obvious muscle wasting  Neuro: No gross deficits, normal gait  Skin: Appropriate color for race, warm and dry, no rashes or lesions to unclothed skin; no palmar plantar erythrodysesthesia  Psych: Pleasant and interactive, affect bright, makes appropriate eye contact, thought process linear    Labs:      8/3/2018  Day 1   Hemoglobin 11.7 - 15.7 g/dL 12.5   Hematocrit 35.0 - 47.0 % 38.3   Platelet Count 150 - 450 10e9/L 225   Absolute Neutrophil 1.6 - 8.3 10e9/L 2.7   Sodium 133 - 144 mmol/L 135   Potassium 3.4 - 5.3 mmol/L 3.6   Chloride 94 - 109 mmol/L 104   Carbon Dioxide 20 - 32 mmol/L 25   Urea Nitrogen 7 - 30 mg/dL 16   Creatinine 0.52 - 1.04 mg/dL 0.73   Calcium 8.5 - 10.1 mg/dL 9.2   Magnesium 1.6 - 2.3 mg/dL 1.4 (A)   Bilirubin Total 0.2 - 1.3 mg/dL 0.6   ALT 0 - 50 U/L 120 (A)   AST 0 - 45 U/L 87 (A)   Alkaline Phosphatase 40 - 150 U/L 395 (A)   Albumin 3.4 - 5.0 g/dL 3.5   Protein Total 6.8 - 8.8 g/dL 7.5   WBC 4.0 - 11.0 10e9/L 5.4    pending    Assessment/Plan:  1) Recurrent stage IIIC bilateral ovarian cancer: Currently feeling well. Proceed with C1D1 weekly paclitaxel as originally ordered by Dr. Yeung. To follow up in clinic for provider visit after six weeks- to call clinic in the interim with concerns. LFTs elevated- no dose reduction of paclitaxel required. Most likely related to her disease- no new medications or hepatotoxins. Denies infectious symptoms. Continue to monitor- reviewed red flags including RUQ pain, fever, chills, vomiting, and jaundice and when to seek further care. Magnesium to be replaced  intravenously in clinic, continue home supplementation. Discussed starting vitamin B6 and L-glutamine for neuropathy prophylaxis given her previous history of neuropathy with paclitaxel. Reviewed signs and symptoms for when she should contact the clinic or seek additional care, including but not limited to fever, chills, inability to keep down food or fluids, nausea and vomiting not controlled with antiemetics, and diarrhea leading to dehydration. Patient to contact the clinic with any questions or concerns in the interim.   2) Genetic counseling: Testing has been performed and she is negative for mutations in BRCA1, BRCA2, EPCAM, MLH1, MSH2, MSH6, PMS2, PTEN, and TP53 genes  3) Health maintenance issues discussed include to continue following up with PCP for non-gynecologic concerns.  4) Patient verbalized understanding of and agreement with plan    A total of 20 minutes spent with patient, over 50% spent in counseling and coordination of care.    HAILE De Paz, FNP-C  Family Nurse Practitioner  Gynecologic Oncology  [facility]  Pager: [phone]

## 2018-08-03 ENCOUNTER — INFUSION THERAPY VISIT (OUTPATIENT)
Dept: ONCOLOGY | Facility: CLINIC | Age: 58
End: 2018-08-03
Attending: OBSTETRICS & GYNECOLOGY
Payer: COMMERCIAL

## 2018-08-03 ENCOUNTER — ONCOLOGY VISIT (OUTPATIENT)
Dept: ONCOLOGY | Facility: CLINIC | Age: 58
End: 2018-08-03
Attending: NURSE PRACTITIONER
Payer: COMMERCIAL

## 2018-08-03 ENCOUNTER — APPOINTMENT (OUTPATIENT)
Dept: LAB | Facility: CLINIC | Age: 58
End: 2018-08-03
Attending: OBSTETRICS & GYNECOLOGY
Payer: COMMERCIAL

## 2018-08-03 VITALS
TEMPERATURE: 97.8 F | SYSTOLIC BLOOD PRESSURE: 119 MMHG | OXYGEN SATURATION: 99 % | RESPIRATION RATE: 16 BRPM | HEART RATE: 80 BPM | DIASTOLIC BLOOD PRESSURE: 69 MMHG | BODY MASS INDEX: 23.53 KG/M2 | WEIGHT: 141.4 LBS

## 2018-08-03 DIAGNOSIS — R74.8 ELEVATED LIVER ENZYMES: ICD-10-CM

## 2018-08-03 DIAGNOSIS — C56.2 OVARIAN CANCER, LEFT (H): ICD-10-CM

## 2018-08-03 DIAGNOSIS — C56.1 OVARIAN CANCER, RIGHT (H): Primary | ICD-10-CM

## 2018-08-03 DIAGNOSIS — C56.1 OVARIAN CANCER, RIGHT (H): ICD-10-CM

## 2018-08-03 DIAGNOSIS — Z79.899 ENCOUNTER FOR LONG-TERM (CURRENT) USE OF MEDICATIONS: Primary | ICD-10-CM

## 2018-08-03 DIAGNOSIS — D70.2 DRUG-INDUCED NEUTROPENIA (H): ICD-10-CM

## 2018-08-03 DIAGNOSIS — Z79.899 ENCOUNTER FOR LONG-TERM (CURRENT) USE OF MEDICATIONS: ICD-10-CM

## 2018-08-03 LAB
ALBUMIN SERPL-MCNC: 3.5 G/DL (ref 3.4–5)
ALP SERPL-CCNC: 395 U/L (ref 40–150)
ALT SERPL W P-5'-P-CCNC: 120 U/L (ref 0–50)
ANION GAP SERPL CALCULATED.3IONS-SCNC: 7 MMOL/L (ref 3–14)
AST SERPL W P-5'-P-CCNC: 87 U/L (ref 0–45)
BASOPHILS # BLD AUTO: 0 10E9/L (ref 0–0.2)
BASOPHILS NFR BLD AUTO: 0.6 %
BILIRUB SERPL-MCNC: 0.6 MG/DL (ref 0.2–1.3)
BUN SERPL-MCNC: 16 MG/DL (ref 7–30)
CALCIUM SERPL-MCNC: 9.2 MG/DL (ref 8.5–10.1)
CHLORIDE SERPL-SCNC: 104 MMOL/L (ref 94–109)
CO2 SERPL-SCNC: 25 MMOL/L (ref 20–32)
CREAT SERPL-MCNC: 0.73 MG/DL (ref 0.52–1.04)
DIFFERENTIAL METHOD BLD: ABNORMAL
EOSINOPHIL # BLD AUTO: 0.1 10E9/L (ref 0–0.7)
EOSINOPHIL NFR BLD AUTO: 2.6 %
ERYTHROCYTE [DISTWIDTH] IN BLOOD BY AUTOMATED COUNT: 13.4 % (ref 10–15)
GFR SERPL CREATININE-BSD FRML MDRD: 81 ML/MIN/1.7M2
GLUCOSE SERPL-MCNC: 95 MG/DL (ref 70–99)
HCT VFR BLD AUTO: 38.3 % (ref 35–47)
HGB BLD-MCNC: 12.5 G/DL (ref 11.7–15.7)
IMM GRANULOCYTES # BLD: 0 10E9/L (ref 0–0.4)
IMM GRANULOCYTES NFR BLD: 0.4 %
LYMPHOCYTES # BLD AUTO: 2 10E9/L (ref 0.8–5.3)
LYMPHOCYTES NFR BLD AUTO: 36.3 %
MAGNESIUM SERPL-MCNC: 1.4 MG/DL (ref 1.6–2.3)
MCH RBC QN AUTO: 33.5 PG (ref 26.5–33)
MCHC RBC AUTO-ENTMCNC: 32.6 G/DL (ref 31.5–36.5)
MCV RBC AUTO: 103 FL (ref 78–100)
MONOCYTES # BLD AUTO: 0.6 10E9/L (ref 0–1.3)
MONOCYTES NFR BLD AUTO: 10.6 %
NEUTROPHILS # BLD AUTO: 2.7 10E9/L (ref 1.6–8.3)
NEUTROPHILS NFR BLD AUTO: 49.5 %
NRBC # BLD AUTO: 0 10*3/UL
NRBC BLD AUTO-RTO: 0 /100
PLATELET # BLD AUTO: 225 10E9/L (ref 150–450)
POTASSIUM SERPL-SCNC: 3.6 MMOL/L (ref 3.4–5.3)
PROT SERPL-MCNC: 7.5 G/DL (ref 6.8–8.8)
RBC # BLD AUTO: 3.73 10E12/L (ref 3.8–5.2)
SODIUM SERPL-SCNC: 135 MMOL/L (ref 133–144)
WBC # BLD AUTO: 5.4 10E9/L (ref 4–11)

## 2018-08-03 PROCEDURE — 96375 TX/PRO/DX INJ NEW DRUG ADDON: CPT

## 2018-08-03 PROCEDURE — 85025 COMPLETE CBC W/AUTO DIFF WBC: CPT | Performed by: OBSTETRICS & GYNECOLOGY

## 2018-08-03 PROCEDURE — 96368 THER/DIAG CONCURRENT INF: CPT

## 2018-08-03 PROCEDURE — 96413 CHEMO IV INFUSION 1 HR: CPT

## 2018-08-03 PROCEDURE — 25000128 H RX IP 250 OP 636: Mod: ZF | Performed by: OBSTETRICS & GYNECOLOGY

## 2018-08-03 PROCEDURE — 80053 COMPREHEN METABOLIC PANEL: CPT | Performed by: OBSTETRICS & GYNECOLOGY

## 2018-08-03 PROCEDURE — 83735 ASSAY OF MAGNESIUM: CPT | Performed by: OBSTETRICS & GYNECOLOGY

## 2018-08-03 PROCEDURE — 25000125 ZZHC RX 250: Mod: ZF | Performed by: OBSTETRICS & GYNECOLOGY

## 2018-08-03 PROCEDURE — 25000128 H RX IP 250 OP 636: Mod: ZF | Performed by: NURSE PRACTITIONER

## 2018-08-03 PROCEDURE — 99213 OFFICE O/P EST LOW 20 MIN: CPT | Mod: ZP | Performed by: NURSE PRACTITIONER

## 2018-08-03 RX ORDER — HEPARIN SODIUM (PORCINE) LOCK FLUSH IV SOLN 100 UNIT/ML 100 UNIT/ML
5 SOLUTION INTRAVENOUS DAILY PRN
Status: DISCONTINUED | OUTPATIENT
Start: 2018-08-03 | End: 2018-08-03 | Stop reason: HOSPADM

## 2018-08-03 RX ORDER — MAGNESIUM SULFATE HEPTAHYDRATE 40 MG/ML
2 INJECTION, SOLUTION INTRAVENOUS ONCE
Status: COMPLETED | OUTPATIENT
Start: 2018-08-03 | End: 2018-08-03

## 2018-08-03 RX ORDER — LORAZEPAM 1 MG/1
1 TABLET ORAL EVERY 6 HOURS PRN
Qty: 30 TABLET | Refills: 2 | Status: SHIPPED | OUTPATIENT
Start: 2018-08-03 | End: 2018-10-23

## 2018-08-03 RX ADMIN — FAMOTIDINE 40 MG: 10 INJECTION INTRAVENOUS at 14:21

## 2018-08-03 RX ADMIN — DEXAMETHASONE SODIUM PHOSPHATE 12 MG: 10 INJECTION, SOLUTION INTRAMUSCULAR; INTRAVENOUS at 14:07

## 2018-08-03 RX ADMIN — SODIUM CHLORIDE, PRESERVATIVE FREE 5 ML: 5 INJECTION INTRAVENOUS at 12:37

## 2018-08-03 RX ADMIN — SODIUM CHLORIDE, PRESERVATIVE FREE 5 ML: 5 INJECTION INTRAVENOUS at 15:56

## 2018-08-03 RX ADMIN — PACLITAXEL 138 MG: 6 INJECTION, SOLUTION INTRAVENOUS at 14:43

## 2018-08-03 RX ADMIN — MAGNESIUM SULFATE HEPTAHYDRATE 2 G: 40 INJECTION, SOLUTION INTRAVENOUS at 14:41

## 2018-08-03 RX ADMIN — DIPHENHYDRAMINE HYDROCHLORIDE 25 MG: 50 INJECTION INTRAMUSCULAR; INTRAVENOUS at 14:27

## 2018-08-03 RX ADMIN — SODIUM CHLORIDE 250 ML: 9 INJECTION, SOLUTION INTRAVENOUS at 14:05

## 2018-08-03 ASSESSMENT — ENCOUNTER SYMPTOMS
VOMITING: 0
HEARTBURN: 0
RECTAL PAIN: 0
ABDOMINAL PAIN: 0
FATIGUE: 0
BLOOD IN STOOL: 0
JAUNDICE: 0
BOWEL INCONTINENCE: 0
DIARRHEA: 1
CONSTIPATION: 0
BLOATING: 0
NAUSEA: 0

## 2018-08-03 ASSESSMENT — PAIN SCALES - GENERAL: PAINLEVEL: NO PAIN (0)

## 2018-08-03 NOTE — PATIENT INSTRUCTIONS
"Signs and symptoms of neuropathy include numbness, tingling, burning, or heaviness of the extremities, typically in a \"stocking/glove\" distribution of the feet and arms. It is important to take care of extremities by doing such things as wearing gloves or boots in cold weather and protecting skin from extreme temperatures, adjusting hot water heater to 105-120 degrees, checking hands and feet daily for skin breakdown, and wearing hard soled protective shoes. Alcohol may worsen symptoms of neuropathy and should be avoided. Neuropathy puts patients at risks for falls. Floor rugs may cause trips and falls and should be avoided. Night lights may help prevent falls at night. Vitamin B6 50mg twice daily and L-glutamine 2000mg twice daily may help decrease neuropathy symptoms during active chemotherapy treatment. Call clinic if you have worsening numbness, tingling, pain, or functional ability as treatment may need to be adjusted.    L-glutamine can be found [facility], [employer], [employer], [employer], and co-ops    [employer] Delicious restaurant in [facility]. Fresh fish.  "

## 2018-08-03 NOTE — PROGRESS NOTES
Infusion Nursing Note:  Odalys Nathan presents today for Day 1 Cycle 1 Taxol and Magnesium   Patient seen by provider today: Yes: Patricia JOHNSON   present during visit today: Not Applicable.    Note: Odalys had Taxol several years ago and is familiar with this medication.  Infusion started at 50 ml/hr for 5 minute then increased to 100 ml/hr for 5 minutes then 200 ml/hr for 5 minutes with remaining dose infused at 298 ml/hr.    Intravenous Access:  Implanted Port.    Treatment Conditions:  Lab Results   Component Value Date    HGB 12.5 08/03/2018     Lab Results   Component Value Date    WBC 5.4 08/03/2018      Lab Results   Component Value Date    ANEU 2.7 08/03/2018     Lab Results   Component Value Date     08/03/2018      Lab Results   Component Value Date     08/03/2018                   Lab Results   Component Value Date    POTASSIUM 3.6 08/03/2018           Lab Results   Component Value Date    MAG 1.4 08/03/2018            Lab Results   Component Value Date    CR 0.73 08/03/2018                   Lab Results   Component Value Date    JOSE ALBERTO 9.2 08/03/2018                Lab Results   Component Value Date    BILITOTAL 0.6 08/03/2018           Lab Results   Component Value Date    ALBUMIN 3.5 08/03/2018                    Lab Results   Component Value Date     08/03/2018           Lab Results   Component Value Date    AST 87 08/03/2018       Results reviewed, labs MET treatment parameters, ok to proceed with treatment.      Post Infusion Assessment:  Patient tolerated infusion without incident.  Blood return noted pre and post infusion.  Site patent and intact, free from redness, edema or discomfort.  No evidence of extravasations.  Access discontinued per protocol.    Discharge Plan:   Prescription refills given for Ativan, Compazine and Zofran.  Discharge instructions reviewed with: Patient and Family.  Patient and/or family verbalized understanding of discharge  instructions and all questions answered.  Copy of AVS reviewed with patient and/or family.  Patient will return 8/9 for next appointment.  AVS to patient via [facility].  Patient will return 8/9 for next appointment.   Patient discharged in stable condition accompanied by: self and .  Departure Mode: Ambulatory.    Marilyn Aaron RN

## 2018-08-03 NOTE — MR AVS SNAPSHOT
"              After Visit Summary   8/3/2018    Odalys Nathan    MRN: [id number]           Patient Information     Date Of Birth          1960        Visit Information        Provider Department      8/3/2018 1:20 PM Patricia Rocha APRN CNP [facility]        Today's Diagnoses     Ovarian cancer, right (H)    -  1    Ovarian cancer, left (H)        Encounter for long-term (current) use of medications        Elevated liver enzymes          Care Instructions    Signs and symptoms of neuropathy include numbness, tingling, burning, or heaviness of the extremities, typically in a \"stocking/glove\" distribution of the feet and arms. It is important to take care of extremities by doing such things as wearing gloves or boots in cold weather and protecting skin from extreme temperatures, adjusting hot water heater to 105-120 degrees, checking hands and feet daily for skin breakdown, and wearing hard soled protective shoes. Alcohol may worsen symptoms of neuropathy and should be avoided. Neuropathy puts patients at risks for falls. Floor rugs may cause trips and falls and should be avoided. Night lights may help prevent falls at night. Vitamin B6 50mg twice daily and L-glutamine 2000mg twice daily may help decrease neuropathy symptoms during active chemotherapy treatment. Call clinic if you have worsening numbness, tingling, pain, or functional ability as treatment may need to be adjusted.    L-glutamine can be found [facility], [employer], [employer], [employer], and co-ops    [employer] Delicious restaurant in [place]. Fresh fish.          Follow-ups after your visit        Your next 10 appointments already scheduled     Aug 09, 2018  8:00 AM CDT   Level 3 with  INFUSION CHAIR [address] [place] [address] [facility] ([facility])    [facility]  [address]  [place] [address]   [phone]            Aug 16, 2018  8:00 AM CDT "   Level 3 with RH INFUSION CHAIR 7   [facility] ([facility])    [facility]  [address]  [place] [address]   [phone]            Aug 22, 2018  8:00 AM CDT   Level 3 with RH INFUSION CHAIR 2   [facility] ([facility])    [facility]  [address]  [place] [address]   [phone]            Aug 30, 2018  9:00 AM CDT   Level 3 with RH INFUSION CHAIR 12   [facility] ([facility])    [facility]  [address]  [place] [address]   [phone]            Sep 06, 2018  8:00 AM CDT   Level 3 with RH INFUSION CHAIR 7   [facility] ([facility])    [facility]  [address]  [place] [address]   [phone]            Sep 20, 2018  7:45 AM CDT   Lab with Infusion with RH LAB DRAW 1   [facility] ([facility])    [facility]  [address]  [place] [address]   [phone]            Sep 20, 2018  8:00 AM CDT   Return Visit with [facility] ([facility])    [facility]  [address]  [place] [address]   [phone]            Sep 20, 2018  9:00 AM CDT   Level 2 with RH INFUSION CHAIR 3   [facility] ([facility])    [facility]  [address]  [place] [address]   [phone]            Oct 04, 2018  8:00 AM CDT   Level 3 with RH INFUSION CHAIR 1   [facility] ([facility])    [facility]  [address]  [place] [address]   [phone]            Oct 11, 2018  8:00 AM CDT   Level 3 with RH INFUSION CHAIR 4   [facility] ([facility])    [facility]  [address] [place] [address]   [phone]              Who to contact     If you have questions or need follow up information about today's clinic visit or your schedule please contact [facility] directly at [phone].  Normal or non-critical lab and imaging results will be communicated to you by [email or web address], letter or phone within 4 business days after the clinic has received the results. If you do not hear from us within 7 days, please contact the clinic through [employer] or phone. If you have a critical or abnormal lab result, we will notify you by phone as soon as possible.  Submit refill requests through [employer] or call your pharmacy and they will forward the refill request to us. Please allow 3 business days for your refill to be completed.          Additional Information About Your Visit        [employer] Information     [employer] gives you secure access to your electronic health record. If you see a primary care provider, you can also send messages to your care team and make appointments. If you have questions, please call your primary care clinic.  If you do not have a primary care provider, please call [phone] and they will assist you.        Care EveryWhere ID     This is your Care EveryWhere ID. This could be used by other organizations to access your [place] medical records  [id number]        Your Vitals Were     Pulse Temperature Respirations Pulse Oximetry BMI (Body Mass Index)       80 97.8  F (36.6  C) (Oral) 16 99% 23.53 kg/m2        Blood Pressure from Last 3 Encounters:   08/03/18 119/69   06/21/18 129/85   03/05/18 108/58    Weight from Last 3 Encounters:   08/03/18 64.1 kg (141 lb 6.4 oz)   06/21/18 64.2 kg (141 lb 9.6 oz)   03/05/18 63.6 kg  (140 lb 3.2 oz)              Today, you had the following     No orders found for display         Today's Medication Changes          These changes are accurate as of 8/3/18  3:42 PM.  If you have any questions, ask your nurse or doctor.               These medicines have changed or have updated prescriptions.        Dose/Directions    * LORazepam 1 MG tablet   Commonly known as:  ATIVAN   This may have changed:  Another medication with the same name was added. Make sure you understand how and when to take each.   Used for:  Ovarian cancer, unspecified laterality (H)   Changed by:  Patricia Rocha APRN CNP        Dose:  1 mg   Take 1 tablet (1 mg) by mouth every 6 hours as needed (Anxiety, Nausea/Vomiting or Sleep)   Quantity:  30 tablet   Refills:  2       * LORazepam 1 MG tablet   Commonly known as:  ATIVAN   This may have changed:  You were already taking a medication with the same name, and this prescription was added. Make sure you understand how and when to take each.   Used for:  Encounter for long-term (current) use of medications, Ovarian cancer, right (H), Ovarian cancer, left (H)   Changed by:  Patricia Rocha APRN CNP        Dose:  1 mg   Take 1 tablet (1 mg) by mouth every 6 hours as needed (Anxiety, Nausea/Vomiting or Sleep)   Quantity:  30 tablet   Refills:  2       * prochlorperazine 10 MG tablet   Commonly known as:  COMPAZINE   This may have changed:  Another medication with the same name was added. Make sure you understand how and when to take each.   Used for:  Encounter for long-term (current) use of medications, Ovarian cancer, right (H), Ovarian cancer, left (H), Drug-induced neutropenia (H)        Dose:  10 mg   Take 1 tablet (10 mg) by mouth every 6 hours as needed (nausea/vomiting)   Quantity:  30 tablet   Refills:  2       * prochlorperazine 10 MG tablet   Commonly known as:  COMPAZINE   This may have changed:  You were already taking a medication with the same name, and this  prescription was added. Make sure you understand how and when to take each.   Used for:  Encounter for long-term (current) use of medications, Ovarian cancer, right (H), Ovarian cancer, left (H), Drug-induced neutropenia (H)        Dose:  10 mg   Take 1 tablet (10 mg) by mouth every 6 hours as needed (Nausea/Vomiting)   Quantity:  30 tablet   Refills:  2       * Notice:  This list has 4 medication(s) that are the same as other medications prescribed for you. Read the directions carefully, and ask your doctor or other care provider to review them with you.      Stop taking these medicines if you haven't already. Please contact your care team if you have questions.     VITAMIN D PO                Where to get your medicines      These medications were sent to [address] [place] [address] [address] [place] [address]    Hours:  TRANSPLANT PHONE NUMBER [phone] Phone:  [phone]     prochlorperazine 10 MG tablet         Some of these will need a paper prescription and others can be bought over the counter.  Ask your nurse if you have questions.     Bring a paper prescription for each of these medications     LORazepam 1 MG tablet                Primary Care Provider Office Phone # Fax #    Aubrie Airam Brooksjethro [phone]       [facility] [address] [facility] [address]        Equal Access to Services     SANDY CHAMBERS AH: Felicia mcnamara Socait, waaxda luqadaha, qaybta kaalmada bel, blanka szymanski. So [facility] [phone].    ATENCIÓN: Si habla español, tiene a bell disposición servicios gratuitos de asistencia lingüística. Llpetra al [phone].    We comply with applicable federal civil rights laws and [place] laws. We do not discriminate on the basis of race, color, national origin, age, disability, sex, sexual orientation, or gender identity.            Thank you!      Thank you for choosing [facility]  for your care. Our goal is always to provide you with excellent care. Hearing back from our patients is one way we can continue to improve our services. Please take a few minutes to complete the written survey that you may receive in the mail after your visit with us. Thank you!             Your Updated Medication List - Protect others around you: Learn how to safely use, store and throw away your medicines at www.disposemymeds.org.          This list is accurate as of 8/3/18  3:42 PM.  Always use your most recent med list.                   Brand Name Dispense Instructions for use Diagnosis    CALCIUM + D PO      Take 1 tablet by mouth daily.    Pelvic mass       cyanocobalamin 1000 MCG/ML injection    VITAMIN B12    1 mL    Inject 1 mL (1,000 mcg) into the muscle every 30 days    B12 deficiency       diphenoxylate-atropine 2.5-0.025 MG per tablet    LOMOTIL    60 tablet    Take 2 tablets by mouth 4 times daily as needed for diarrhea    Acute diarrhea       Ferrous Sulfate 324 (65 Fe) MG Tbec     90 tablet    Take 1 tablet by mouth 3 times daily (with meals). Take with a small amount of orange juice. Do not take with calcium.    Ovarian cancer, right (H), Anemia, unspecified type, Ovarian cancer, left (H)       HERBALS      daily        * iohexol 140 MG/ML Soln solution    OMNIPAQUE    140 mL    Mix entire bottle (50ml) of contast with 600ml (20 ounces) of water and drink half 2 hrs prior to CT scan and half 1 hr prior to scan    Ovarian cancer, right (H), Metastatic cancer (H)       * iohexol 140 MG/ML Soln solution    OMNIPAQUE    50 mL    Mix entire bottle (50ml) of contast with 600ml (20 ounces) of water and drink half 2 hrs prior to CT scan and half 1 hr prior to scan    Personal history of ovarian cancer       LEVOTHYROXINE SODIUM PO      Take by mouth daily        * LORazepam 1 MG tablet    ATIVAN    30 tablet    Take 1 tablet (1 mg) by mouth every 6  hours as needed (Anxiety, Nausea/Vomiting or Sleep)    Ovarian cancer, unspecified laterality (H)       * LORazepam 1 MG tablet    ATIVAN    30 tablet    Take 1 tablet (1 mg) by mouth every 6 hours as needed (Anxiety, Nausea/Vomiting or Sleep)    Encounter for long-term (current) use of medications, Ovarian cancer, right (H), Ovarian cancer, left (H)       magnesium oxide 400 MG tablet    MAG-OX    90 tablet    Take 1 tablet (400 mg) by mouth 2 times daily    Ovarian cancer, unspecified laterality (H)       order for DME     3 each    Injection Supplies for Vitamin B12: 3cc syringes w/ 27 gauge needles, 1/2 inch length    B12 deficiency       potassium chloride 20 MEQ Packet    KLOR-CON     Take 20 mEq by mouth daily        * prochlorperazine 10 MG tablet    COMPAZINE    30 tablet    Take 1 tablet (10 mg) by mouth every 6 hours as needed (nausea/vomiting)    Encounter for long-term (current) use of medications, Ovarian cancer, right (H), Ovarian cancer, left (H), Drug-induced neutropenia (H)       * prochlorperazine 10 MG tablet    COMPAZINE    30 tablet    Take 1 tablet (10 mg) by mouth every 6 hours as needed (Nausea/Vomiting)    Encounter for long-term (current) use of medications, Ovarian cancer, right (H), Ovarian cancer, left (H), Drug-induced neutropenia (H)       VITAMIN C PO      Take 500 mg by mouth daily    Pelvic mass       VITAMIN E NATURAL PO      Take 100 Units by mouth daily        * Notice:  This list has 6 medication(s) that are the same as other medications prescribed for you. Read the directions carefully, and ask your doctor or other care provider to review them with you.

## 2018-08-03 NOTE — MR AVS SNAPSHOT
After Visit Summary   8/3/2018    Odalys Nathan    MRN: [id number]           Patient Information     Date Of Birth          1960        Visit Information        Provider Department      8/3/2018 1:30 PM UC 10 ATC; UC ONCOLOGY INFUSION [facility]        Today's Diagnoses     Encounter for long-term (current) use of medications    -  1    Ovarian cancer, right (H)        Ovarian cancer, left (H)        Drug-induced neutropenia (H)          Care Instructions          August 2018 Sunday Monday Tuesday Wednesday Thursday Friday Saturday                  1     2     3  Happy Birthday!     [facility] DRAW   12:45 PM   (15 min.)    [facility] D[facility]   [facility] Draw     [facility] RETURN ACTIVE TREATMENT    1:05 PM   (40 min.)   Patricia Rocha APRN CNP   [facility]    1:30 PM   (120 min.)   UC ONCOLOGY INFUSION   [facility] 4       5     6     7     8     9     LEVEL 3    8:00 AM   (180 min.)   RH INFUSION CHAIR 8   [facility] 10     11       12     13     14     15     16     LEVEL 3    8:00 AM   (180 min.)   RH INFUSION CHAIR 7   [facility] 17     18       19     20     21     22     LEVEL 3    8:00 AM   (180 min.)   RH INFUSION CHAIR 2   [facility] 23     24     25       26     27     28     29     30     LEVEL 3    9:00 AM   (180 min.)   RH INFUSION CHAIR 12   [facility] 31 September 2018 Sunday Monday Tuesday Wednesday Thursday Friday Saturday                                 1       2     3     4     5     6     LEVEL 3    8:00 AM   (180 min.)   RH INFUSION CHAIR 7   [facility] 7     8       9     10     11     12     13     14     15       16     17     18     19     20     LAB WITH INFUSION     7:45 AM   (15 min.)   RH LAB DRAW 1   [facility]     RETURN    8:00 AM   (40 min.)   Patricia Rocha APRN CNP   [facility]     LEVEL 2    9:00 AM   (120 min.)    INFUSION CHAIR 3   [facility] 21     22       23     24     25     26     27     28     29       30                                                Lab Results:  Recent Results (from the past 12 hour(s))   CBC with platelets differential    Collection Time: 08/03/18 12:43 PM   Result Value Ref Range    WBC 5.4 4.0 - 11.0 10e9/L    RBC Count 3.73 (L) 3.8 - 5.2 10e12/L    Hemoglobin 12.5 11.7 - 15.7 g/dL    Hematocrit 38.3 35.0 - 47.0 %     (H) 78 - 100 fl    MCH 33.5 (H) 26.5 - 33.0 pg    MCHC 32.6 31.5 - 36.5 g/dL    RDW 13.4 10.0 - 15.0 %    Platelet Count 225 150 - 450 10e9/L    Diff Method Automated Method     % Neutrophils 49.5 %    % Lymphocytes 36.3 %    % Monocytes 10.6 %    % Eosinophils 2.6 %    % Basophils 0.6 %    % Immature Granulocytes 0.4 %    Nucleated RBCs 0 0 /100    Absolute Neutrophil 2.7 1.6 - 8.3 10e9/L    Absolute Lymphocytes 2.0 0.8 - 5.3 10e9/L    Absolute Monocytes 0.6 0.0 - 1.3 10e9/L    Absolute Eosinophils 0.1 0.0 - 0.7 10e9/L    Absolute Basophils 0.0 0.0 - 0.2 10e9/L    Abs Immature Granulocytes 0.0 0 - 0.4 10e9/L    Absolute Nucleated RBC 0.0    Comprehensive metabolic panel    Collection Time: 08/03/18 12:43 PM   Result Value Ref Range    Sodium 135 133 - 144 mmol/L    Potassium 3.6 3.4 - 5.3 mmol/L    Chloride 104 94 - 109 mmol/L    Carbon Dioxide 25 20 - 32 mmol/L    Anion Gap 7 3 - 14 mmol/L    Glucose 95 70 - 99 mg/dL    Urea Nitrogen 16 7 - 30 mg/dL    Creatinine 0.73 0.52 - 1.04 mg/dL    GFR Estimate 81 >60 mL/min/1.7m2    GFR Estimate If Black >90 >60 mL/min/1.7m2    Calcium 9.2 8.5 - 10.1 mg/dL    Bilirubin Total 0.6 0.2 - 1.3 mg/dL    Albumin 3.5 3.4 - 5.0 g/dL    Protein Total 7.5 6.8 - 8.8 g/dL    Alkaline  Phosphatase 395 (H) 40 - 150 U/L     (H) 0 - 50 U/L    AST 87 (H) 0 - 45 U/L   Magnesium    Collection Time: 08/03/18 12:43 PM   Result Value Ref Range    Magnesium 1.4 (L) 1.6 - 2.3 mg/dL             Follow-ups after your visit        Your next 10 appointments already scheduled     Aug 09, 2018  8:00 AM CDT   Level 3 with RH INFUSION CHAIR 8   [facility] ([facility])    [facility]  [address]  [facility] [address]   [phone]            Aug 16, 2018  8:00 AM CDT   Level 3 with RH INFUSION CHAIR 7   [facility] ([facility])    [facility]  [address]  [facility] [address]   [phone]            Aug 22, 2018  8:00 AM CDT   Level 3 with RH INFUSION CHAIR 2   [facility] ([facility])    [facility]  [address]  [facility] [address]   [phone]            Aug 30, 2018  9:00 AM CDT   Level 3 with RH INFUSION CHAIR 12   [facility] ([facility])    [facility]  [address]  [facility] [address]   [phone]            Sep 06, 2018  8:00 AM CDT   Level 3 with RH INFUSION CHAIR 7   [facility] ([facility])    [facility]  [address]  [place] [address]   [phone]            Sep 20, 2018  7:45 AM CDT   Lab with Infusion with RH LAB DRAW 1   [facility] ([facility])    [facility]  [address]  [place] [address]   [phone]            Sep 20, 2018  8:00 AM CDT   Return Visit with HAILE De Paz [facility] ([facility])    [facility]  [address]  [place] [address]   [phone]            Sep 20, 2018  9:00 AM CDT   Level 2 with RH INFUSION CHAIR 3   [facility] ([facility])    [facility]  [address]  [place] [address]   [phone]            Oct 04, 2018  8:00 AM CDT   Level 3 with RH INFUSION CHAIR 1   [facility] ([facility])    [facility]  [address]  [place] [address]   [phone]            Oct 11, 2018  8:00 AM CDT   Level 3 with RH INFUSION CHAIR 4   [facility] ([facility])    [facility]  [address]  [place] [address]   [phone]              Who to contact     If you have questions or need follow up information about today's clinic visit or your schedule please contact [facility] directly at [phone].  Normal or non-critical lab and imaging results will be communicated to you by MyChart, letter or phone within 4 business days after the clinic has received the results. If you do not hear from us within 7 days, please contact the clinic through [employer] or phone. If you have a critical or abnormal lab result, we will notify you by phone as soon as possible.  Submit refill requests through [employer] or call your pharmacy and they will forward the refill request to us. Please allow 3 business days for your refill to be completed.          Additional Information About Your Visit        [employer] Information     [employer] gives you secure access to your electronic health record. If you see a primary care provider, you can also send messages to your care team and make appointments. If you have questions, please call your primary care clinic.  If you do not have a primary care provider, please call  [phone] and they will assist you.        Care EveryWhere ID     This is your Care EveryWhere ID. This could be used by other organizations to access your [facility] medical records  [id number]         Blood Pressure from Last 3 Encounters:   08/03/18 119/69   06/21/18 129/85   03/05/18 108/58    Weight from Last 3 Encounters:   08/03/18 64.1 kg (141 lb 6.4 oz)   06/21/18 64.2 kg (141 lb 9.6 oz)   03/05/18 63.6 kg (140 lb 3.2 oz)              We Performed the Following     CBC with platelets differential     Comprehensive metabolic panel     Magnesium          Today's Medication Changes          These changes are accurate as of 8/3/18  4:10 PM.  If you have any questions, ask your nurse or doctor.               These medicines have changed or have updated prescriptions.        Dose/Directions    * LORazepam 1 MG tablet   Commonly known as:  ATIVAN   This may have changed:  Another medication with the same name was added. Make sure you understand how and when to take each.   Used for:  Ovarian cancer, unspecified laterality (H)   Changed by:  Patricia Rocha APRN CNP        Dose:  1 mg   Take 1 tablet (1 mg) by mouth every 6 hours as needed (Anxiety, Nausea/Vomiting or Sleep)   Quantity:  30 tablet   Refills:  2       * LORazepam 1 MG tablet   Commonly known as:  ATIVAN   This may have changed:  You were already taking a medication with the same name, and this prescription was added. Make sure you understand how and when to take each.   Used for:  Encounter for long-term (current) use of medications, Ovarian cancer, right (H), Ovarian cancer, left (H)   Changed by:  Patricia Rocha APRN CNP        Dose:  1 mg   Take 1 tablet (1 mg) by mouth every 6 hours as needed (Anxiety, Nausea/Vomiting or Sleep)   Quantity:  30 tablet   Refills:  2       * prochlorperazine 10 MG tablet   Commonly known as:  COMPAZINE   This may have changed:  Another medication with the same name was added. Make sure you understand how  and when to take each.   Used for:  Encounter for long-term (current) use of medications, Ovarian cancer, right (H), Ovarian cancer, left (H), Drug-induced neutropenia (H)        Dose:  10 mg   Take 1 tablet (10 mg) by mouth every 6 hours as needed (nausea/vomiting)   Quantity:  30 tablet   Refills:  2       * prochlorperazine 10 MG tablet   Commonly known as:  COMPAZINE   This may have changed:  You were already taking a medication with the same name, and this prescription was added. Make sure you understand how and when to take each.   Used for:  Encounter for long-term (current) use of medications, Ovarian cancer, right (H), Ovarian cancer, left (H), Drug-induced neutropenia (H)        Dose:  10 mg   Take 1 tablet (10 mg) by mouth every 6 hours as needed (Nausea/Vomiting)   Quantity:  30 tablet   Refills:  2       * Notice:  This list has 4 medication(s) that are the same as other medications prescribed for you. Read the directions carefully, and ask your doctor or other care provider to review them with you.      Stop taking these medicines if you haven't already. Please contact your care team if you have questions.     VITAMIN D PO                Where to get your medicines      These medications were sent to [address] [address]    Hours:  TRANSPLANT PHONE NUMBER [phone] Phone:  [phone]     prochlorperazine 10 MG tablet         Some of these will need a paper prescription and others can be bought over the counter.  Ask your nurse if you have questions.     Bring a paper prescription for each of these medications     LORazepam 1 MG tablet                Primary Care Provider Office Phone # Fax #    Aubrie Airam Hester [phone]       [facility] [address]  [facility] [address]        Equal Access to Services     SANDY CHAMBERS AH: Felicia mcnamara  Mainecait, annamarieda luqadaha, qachanningta kagianluca staples, blanka szymanski. So [facility] [phone].    ATENCIÓN: Si salas longoria, tiene a bell disposición servicios gratuitos de asistencia lingüística. Liban al [phone].    We comply with applicable federal civil rights laws and [place] laws. We do not discriminate on the basis of race, color, national origin, age, disability, sex, sexual orientation, or gender identity.            Thank you!     Thank you for choosing [facility]  for your care. Our goal is always to provide you with excellent care. Hearing back from our patients is one way we can continue to improve our services. Please take a few minutes to complete the written survey that you may receive in the mail after your visit with us. Thank you!             Your Updated Medication List - Protect others around you: Learn how to safely use, store and throw away your medicines at www.disposemymeds.org.          This list is accurate as of 8/3/18  4:10 PM.  Always use your most recent med list.                   Brand Name Dispense Instructions for use Diagnosis    CALCIUM + D PO      Take 1 tablet by mouth daily.    Pelvic mass       cyanocobalamin 1000 MCG/ML injection    VITAMIN B12    1 mL    Inject 1 mL (1,000 mcg) into the muscle every 30 days    B12 deficiency       diphenoxylate-atropine 2.5-0.025 MG per tablet    LOMOTIL    60 tablet    Take 2 tablets by mouth 4 times daily as needed for diarrhea    Acute diarrhea       Ferrous Sulfate 324 (65 Fe) MG Tbec     90 tablet    Take 1 tablet by mouth 3 times daily (with meals). Take with a small amount of orange juice. Do not take with calcium.    Ovarian cancer, right (H), Anemia, unspecified type, Ovarian cancer, left (H)       HERBALS      daily        * iohexol 140 MG/ML Soln solution    OMNIPAQUE    140 mL    Mix entire bottle (50ml) of contast with 600ml (20 ounces) of water and drink half 2 hrs prior to CT  scan and half 1 hr prior to scan    Ovarian cancer, right (H), Metastatic cancer (H)       * iohexol 140 MG/ML Soln solution    OMNIPAQUE    50 mL    Mix entire bottle (50ml) of contast with 600ml (20 ounces) of water and drink half 2 hrs prior to CT scan and half 1 hr prior to scan    Personal history of ovarian cancer       LEVOTHYROXINE SODIUM PO      Take by mouth daily        * LORazepam 1 MG tablet    ATIVAN    30 tablet    Take 1 tablet (1 mg) by mouth every 6 hours as needed (Anxiety, Nausea/Vomiting or Sleep)    Ovarian cancer, unspecified laterality (H)       * LORazepam 1 MG tablet    ATIVAN    30 tablet    Take 1 tablet (1 mg) by mouth every 6 hours as needed (Anxiety, Nausea/Vomiting or Sleep)    Encounter for long-term (current) use of medications, Ovarian cancer, right (H), Ovarian cancer, left (H)       magnesium oxide 400 MG tablet    MAG-OX    90 tablet    Take 1 tablet (400 mg) by mouth 2 times daily    Ovarian cancer, unspecified laterality (H)       order for DME     3 each    Injection Supplies for Vitamin B12: 3cc syringes w/ 27 gauge needles, 1/2 inch length    B12 deficiency       potassium chloride 20 MEQ Packet    KLOR-CON     Take 20 mEq by mouth daily        * prochlorperazine 10 MG tablet    COMPAZINE    30 tablet    Take 1 tablet (10 mg) by mouth every 6 hours as needed (nausea/vomiting)    Encounter for long-term (current) use of medications, Ovarian cancer, right (H), Ovarian cancer, left (H), Drug-induced neutropenia (H)       * prochlorperazine 10 MG tablet    COMPAZINE    30 tablet    Take 1 tablet (10 mg) by mouth every 6 hours as needed (Nausea/Vomiting)    Encounter for long-term (current) use of medications, Ovarian cancer, right (H), Ovarian cancer, left (H), Drug-induced neutropenia (H)       VITAMIN C PO      Take 500 mg by mouth daily    Pelvic mass       VITAMIN E NATURAL PO      Take 100 Units by mouth daily        * Notice:  This list has 6 medication(s) that are the  same as other medications prescribed for you. Read the directions carefully, and ask your doctor or other care provider to review them with you.

## 2018-08-03 NOTE — LETTER
8/3/2018       RE: Odalys Nathan  [id number]  [facility] [id number]     Dear Colleague,    Thank you for referring your patient, Odalys Nathan, to the [facility]. Please see a copy of my visit note below.    Gynecologic Oncology Follow-Up Note  RE: Odalys Nathan  MRN: [id number]  : 1960  Date of Visit: 2018    CC: Odalys Nathan is a 57 year old year old female with recurrent stage IIIC bilateral ovarian cancer who presents today for disease management with Doxil.    HPI: Odalys comes to the clinic accompanied by her  Marcos. She is feeling well and in her usual state of health. Continues with chronic diarrhea, denies need for intervention. Reports previously tolerating Taxol fairly well with minimal side effects other than mild neuropathy. Takes magnesium once daily when she feels she can tolerate it- develops diarrhea with this. She continues on ferrous sulfate 325mg TID for history of anemia. She also continues tinnitus- denies need for intervention. Has had many cycles of carboplatin, no history of reaction thus far. Denies fevers, chills, RUQ pain, jaundice, or vomiting. She is eating well, currently drinking well, reports she is ready for chemotherapy today.    Brief Oncology History:  2012 - Admitted to hospital for 2 weeks of intermittent abdominal cramping, distention, diarrhea and N/V. CT of abdomen/pelvis significant for small bowel obstruction, a heterogenous soft tissue density in the pelvis, omental nodules, and ascites. Bilateral adnexal masses per U/S of pelvis. CA-125 was elevated at 987, CEA was normal at 1.0.    12 - Therapeutic paracentesis (4 L) with cytology confirming malignancy (AK positive, weak ER positive, CK-7 positive consistent with GYN primary). Surgery recommended d/t potential for falling blood counts 2/2 chemotherapy (patient is Jainism and limiting blood transfusion)    12 -  Exploratory laparotomy, MAR BSO, lysis of adhesion, Appendectomy, Repair cystotomy, Omentectomy Nefi-caavwn-vedzvabai-transverse-colon resection, Ileo-descending colon anastomosis, CUSA, & Colonoscopy (done by Dr. Amato and colorectal team).  2/20/2012 - Admitted to hospital for bilateral pulmonary emboli and drainage of pleural effusion. Started on Lovenox.  2/28/12-3/21/12: Cycle #1-2 Carbo/Taxol IV  3/29/12 - Started on Keflex by her PCP for infection in her healing wound (immediately below her umbilicus).    4/11/12-6/14/12: Cycle #3-6 IV chemo, Cycle #1 IV/IP chemo  7/16/12 -  8, CT PRADEEP - enrolled in  - observation arm  3/4/13-6/28/13:  6, 9, 12, 15, 20.  7/1/13: CT Chest, Abdomen, Pelvis IMPRESSION:  1. Worsening metastatic ovarian carcinoma suggested by increased size of soft tissue nodules anterior to the right psoas muscle, that may represent growing mesenteric lymphadenopathy.  2. Remaining prominent right lower quadrant mesenteric lymph nodes are not significantly changed from CT 7/16/2012.  3. Clustered nodular opacities in the right lower lobe are not significant change from 7/16/2012 and remain indeterminate. Again, the appearance and distribution is suggestive of an infectious etiology.  4. Stable 8 mm soft tissue nodule in left breast, unchanged since at least 02/20/2012. This can be observed on followup studies, but correlation with mammography could be considered.  Decision made to start Doxil/Carbo.  7/11/13: The left ventricular ejection fraction is normal at 66.4%.  7/12/13-9/6/13: Cycle #1-3 Doxil/Carbo.  10, 11.    9/30/13 CT C/A/P Impression:  1. Overall, favorable response to treatment with decreasing size of soft tissue nodules tracking along the anterior aspect of the right psoas muscle.  2. 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  3. Improved cluster of right lower lobe pulmonary nodular opacities. These may represent resolving infection.  10/3/13-12/9/13:   9, 8, 10. Cycle 4-6 Doxil/Carbo.    1/13/14 CT C/A/P Impression:   1. Stable appearance of metastatic ovarian cancer. Scattered soft tissue nodules along the anterior aspect of the right psoas muscle are unchanged in size. Mild mesenteric lymphadenopathy is unchanged.  2. Clustered micronodules in the right lower lobe are unchanged from 9/30/2013, but improved from 7/1/2013. This history suggests a postinflammatory/postinfectious etiology.  3. 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  1/16/14 Discussed multiple options for her based on relatively stable-appearing disease on CT but slight increase in  (which has had small increase to 20 with last recurrence) including chemo break with recheck of  in 1 month, starting new chemo agent immediately, and exploratory surgery with possible resection of nodules. She is considered platinum-sensitive based on > 1 year remission after Taxol/Carbo, which we will take into consideration for future chemo planning. I am not inclined to surgery at this time given difficulty she already has with diarrhea secondary to past colon resection. I suspect we would need to resect further bowel due to mesenteric disease. Also explained inherent risks of any major surgery. Also mentioned maintenance chemo, but this has not been shown to increase overall survival and would likely decrease her quality of life without significant benefit. Family is going on vacation to [place] in 2 weeks and Odalys does not want to have chemo prior to that, so will plan to take 1 month break. She can have  at that time (discussed checking toady since last draw was in early December, but as it would likely not change treatment plan and she has h/o slow rising , will not check today).  2/17/14  16    2/20/14: Decision to take break from chemo for two months, followed by CT and CA-125.    4/21/14  27  4/21/14 CT C/A/P Impression:    1. Increased size of 2  "low-attenuation lymph nodes anterior to the right psoas muscle is concerning for worsening metastatic ovarian cancer.    2. New circumferential thickening of a 3.8 cm length segment of distal transverse colon is likely physiologic. Recommend attention on followup imaging.    3. Grossly unchanged size of clustered small nodules versus scarring in the right lower lobe the lungs.    4. Stable thrombosis of the right ovarian vein.  5/14/14: Diagnostic laparoscopy converted to exploratory laparotomy and removal of mesenteric masses, tumor debulking, peritoneal biopsies and intraperitoneal port placement. On laparoscopy, it was noted that there were small nodules on the anterior abdominal wall near the previous incision, small nodules on the right pelvic sidewall as well. Nodules were palpated in the mesentery; however, as it was unable to clarify where the origin of the nodules was, the decision was made to open the patient. On opening there was found to be approximately a 3 cm nodule in the small bowel mesentery and another separate approximately 2 cm nodule in the bowel mesentery. Pelvis without evidence of cancer, some mesenteric lymph nodes were palpated. No evidence otherwise of any disseminated cancer throughout the abdomen.    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 (2/2)  -Largest metastasis: 1.3 cm  -See comment  D: Peritoneum, right paracolic gutter #2, biopsy:  -Fibroadipose tissue with granulomatous inflammation surrounding refractile material  -Negative for malignancy   E: Small bowel adhesion, biopsy:  -Fibroconnective tissue, consistent with " "adhesion  -Negative for malignancy  F: Lymph nodes, mesentry, not otherwise specified, excision:  -Two lymph nodes, negative for metastatic carcinoma (0/2)  G: Lymph node, mesentery, \"#2\", excision:  -One lymph node, negative for metastatic carcinoma (0/1)  H: Lymph nodes, mesentery, \"nodule #2\", excision:  -Five lymph nodes, negative for metastatic carcinoma (0/5)  COMMENT:  Some of the specimens show post-operative changes. Others show possible treatment related changes, including necrosis. The metastatic carcinoma in the mesenteric lymph nodes (specimen C) shows variable morphology, including relatively low grade tumor with papillary architecture, and high grade tumor comprised of nests of tumor cells with irregular, slit-like spaces and marked nuclear pleomorphism.    5/29/14: Cycle 1 IV PACLitaxel / IP CISplatin / IP PACLitaxel.  - 28.  6/26/14: Cycle #2 IV/IP.  10  8/5/14: CT chest/abd/pelvis IMPRESSION     1. In this patient with ovarian cancer, overall findings are indicative of stable/slight improvement, as multiple mesenteric lymphadenopathy and scattered nodular peritoneal soft tissue mass lesions appear unchanged or slightly smaller since 4/21/2014.    2. 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 Mild dilatation of the second and the third portion of the duodenum with a narrow SMA angle. This could represent SMA syndrome, if clinically correlated.17/14:    Cycle #3 IV/IP.  10.    CT chest/abd/pelvis with contrast on 8/5/14    Impression:    1. In this patient with ovarian cancer, overall findings are indicative of stable/slight improvement, as multiple mesenteric lymphadenopathy and scattered nodular peritoneal soft tissue mass lesions appear unchanged or slightly smaller since 4/21/2014.    2. 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 Mild dilatation of the second and the third portion of the duodenum with a narrow SMA angle. This could represent " SMA syndrome, if clinically correlated.  8/7/14: Cycle #4 Taxol/Carbo (changed from IV/IP).  10. She has been feeling okay. She is unsure if she can finish out the course of 6 cycles IV/IP taxol/cisplatin. She feels like she has the flu for about a week then starts feeling gradually better after each chemo cycle. Her spouse notes that she actually has been more sick with the treatments than she initially admits here. She was also previously having some rib pain. Denies any rib pain now. Denies any chest pain or shortness of breath.  Plan: discussed recent CT cap results and switching to just IV as she is feeling miserable with IP treatments. Switch to IV carbo/taxol as patient is platinum sensitive.  We also discussed her taking part of the tesaro trial, which would require BRCA testing. She would like to take part in this trial if eligible.  8/20/14: Remove Intraperitoneal Port ( Port and catheter intact - discarded)  8/28/14: Cycle #5 Taxol/Carbo held due to thrombocytopenia.  6.    She denies any vaginal bleeding, no changes in her bowel or bladder habits, no nausea/emesis, no lower extremity edema, and no difficulties eating or sleeping. She denies any abdominal discomfort/bloating, no fevers or chills, and no chest pain or shortness of breath. She states her diarrhea is the same. She reports some fatigue which improves about 1-2 weeks after her chemotherapy. She states she does not need any medication refills and she was told she does not meet the criteria for the TESARO trial. She states she has 3 bags of iv fluids left over from her previous chemotherapy and will give these to herself. She states she is ready for her treatment today.    9/29/14: Cycle #6 Taxol/Carbo  6. Insurance questions regarding GSF coverage today. No concerns other than fatigue. Taking iron for anemia and does not desire blood transfusion. Using neulasta for neutropenia. Using home IV hydration if needed. Baseline  unchanged. No abdominal bloating, constipation, diarrhea, pain, vaginal or rectal bleeding, cough or dyspnea, fluid retention.    10/16/14: Impression:    1. Nodular peritoneal soft tissue mass in the right lower quadrant adjacent to the psoas muscle is no longer appreciated. Adjacent prominent lymphadenopathy is unchanged from previous exam. No new peritoneal lesions.    2. 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    10/20/14:  5. CT chest/abdomen/pelvis on 10/16/14 showed nodular peritoneal soft tissue mass in the right lower quadrant adjacent to the psoas muscle is no longer appreciated. Adjacent prominent lymphadenopathy is unchanged from previous exam. 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  1/27/15:  6.  4/28/15:  14.  5/26/15:  18.  6/2/15: CT cap Impression:  1. Postsurgical changes of hysterectomy and bilateral salpingo-oophorectomy for ovarian cancer. There is a new 8 mm hazy, ill-defined hypoattenuating lesion in hepatic segment 6 which is suspicious for a metastatic deposit. Further evaluation with ultrasound in recommended.    2. Increased size of a left retroperitoneal lymph node which is indeterminate but may represent a ciro metastasis. Mildly prominent lymph nodes in the right lower quadrant are not significantly changed.  3. Moderate colonic stool burden.    6/4/15: US abdomen IMPRESSION:    Hyperechoic lesion in the right hepatic lobe, consistent with hemangioma. This does not corresponds to the area of the lesion seen on CT from 6/2/2015. An MR would be helpful for identifying and characterizing the lesion from the recent CT.  6/12/15: MR abd IMPRESSION:  1. New 20 x 11 mm enhancing lesions between the right obliques, concerning for metastatic disease. This lesions should be amenable to percutaneous biopsy, if indicated.  2. Correlating to the lesion visualized on comparison CT is a hepatic segment 6  subcapsular 7 mm lesion. Overall the appearance favors the diagnosis of a simple cyst. However, there is faint suggestion of mild peripheral arterial enhancement. Although this is favored as  artifactual, this should be followed up to confirm stability. Recommend 6 month followup.  3. Hepatic segment 6, 5 mm lesion too small to technically characterize. Differential would favor FNH, less likely flash filling hemangioma. Recommend attention on followup.  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     9/1/15: Cycle #1 Avastin/Cytoxan.   31.     9/24/15:feeling generally well. She says she has been having back and stomach spasms. She is taking cytosine daily (she ran out yesterday). She also says its affecting her voice. Admits that it burns occasionally. She is eating and drinking normal. She also admit diarrhea, 5-7 times daily, lose/watery. She trying to stay hydrated and eat fiber. She also says that her body is sore, especially the bottom of her feet. Her blood pressure is normal. She also admits having headache after her first infusion.      9/24/15: Cycle #2 Avastin/Cytoxan.  19.  10/15/15: Cycle #3 Avastin/Cytoxan.  16.     11/6/15: CT c/a/p IMPRESSION:    1. Stable postoperative change of MAR/BSO for ovarian cancer.  2. The lesion in the right flank abdominal musculature is slightly decreased in size. Otherwise, stable examination.  2. No evidence of metastatic disease in the chest.    11/20/15: Treatment planning visit,  16    11/25/15: surgical pathology report  FINAL DIAGNOSIS:  Soft tissue, right oblique muscle mass, excision:  -Recurrent ovarian serous carcinoma  -Carcinoma is present less than 1 mm from one resection margin  -Background skeletal muscle and fibroadipose tissue    1/4/16:  22  1/11/16-1/27/16: Radiation to right flank x 12  treatments  4/7/2016:  94. CT cap IMPRESSION:    In this patient with ovarian cancer status post MAR/BSO and descending/transverse colectomy:  1. No evidence for malignancy in the chest, abdomen, or pelvis.  2. Stable small hypodense segment 6 liver lesion, appears more likely benign, possibly a cyst.  5/13/16:  124.  6/3/16: PET CT IMPRESSION: In this patient with a history of ovarian cancer:  1. Hypermetabolic and enlarging periaortic and perihepatic lymphadenopathy compatible with metastatic disease, as detailed above.  2. Although hypodense lesion in hepatic segment 6 has been present since 6/2/2015 associated hypermetabolism makes this lesion highly concerning for metastatic disease.    Plan: to start Niraparib under TESARO study.     6/9/16:  137.  6/14/16: Cycle #1 Niraparib.    6/28/16: Cycle #1 D15 Niraparib.    7/5/16:  100.    7/11/16: Cycle #2 Nraparib.   83.    8/3/2016: PET CT IMPRESSION:    In this patient with known history of ovarian cancer:  1) New pleural based nodular opacities in the lateral and inferior aspects of the bilateral lower lobes, worse in the left lung. Likely infection. Close follow up is recommended.      2) Slight decrease in hypermetabolic abdominal lymphadenopathy. 2 hypermetabolic lymph nodes persist.  3) Unchanged right hepatic lobe metastatic lesion.     8/9/16: Cycle #3 Niraparib.  69.  9/6/16: Cycle #4 Niraparib.  53. Dose held due to anemia.  9/13/16: Eval for potential cycle 4 niraparib. Dose held due to anemia.  10/4/16: CT CAP impression:  IMPRESSION: In this patient with a known history of ovarian cancer:  1. There has been interval resolution of pleural-based nodular  opacities which likely represented infection.  2. Abdominal lymphadenopathy in the form of 2 portacaval lymph nodes  have not significantly changed in size, noted to be hypermetabolic on  prior PET/CT.  3. Previously demonstrated metastatic lesion in the right  lobe of the  liver is not significantly changed.  10/11/16:  60  11/1/16: C6 niraparib,  75  11/29/16: C7 niraparib.  78. CT CAP impression as follows:  Target lesions (RECIST criteria):       A previously described target lesion superior to the head of the  pancreas (series 2, image 64)  (referred to as a perihepatic node on  6/3/2016) may not be a valid target lesion because it measured less  than 1.5 cm originally. However, this particular node has decreased in  size, now measuring 7 mm in short axis versus 14 mm on 6/3/2016 when  measured in a similar fashion.       2.3 cm short axis portacaval lymph node on series 2 image 67,  previously 2.0 cm on 10/4/2016.       1.2 cm subtle hypodensity in hepatic segment 6 on series 2 image 75,  stable on multiple studies since at least 6/3/2016     Sum of diameters today: 3.5 cm. Sum of diameters 10/4/2016: 3.2 cm.  Growth = 9%.    12/27/16: C8 niraparib.  105.  1/25/17: C9 niraparib.  108.  2/23/17: C10 niraparib.  132.   CT CAP impression:  Sum of target lesion diameters today: 3.7 cm. Sum of target lesion  diameters on 11/28/2016: 3.5 cm. Growth= 6%  1. In this patient with history of ovarian cancer there is stable  disease by RECIST criteria as evidenced by:   1a. Mildly increased size of liver metastasis.  1b. Stable portacaval lymphadenopathy.  1c. No evidence of metastatic disease in the chest.  2. Trace emphysematous changes of the lungs.  3/22/17: C11 niraparib.  132.  4/19/17: C12 niraparib.  127.  5/16/17: CT CAP IMPRESSION: In this patient with ovarian cancer:  1. Mildly increased size of hepatic metastasis segment 6 with subtle increased capsular retraction.  2. Stable edmundo hepatis nodes, with mild increase in size of periaortic lymph nodes.  3. Prominent left supraclavicular lymph node with subtle increase in size compared to prior studies, particularly comparing to 10/4/2016.  4. Mild subtle groundglass  opacities in the right upper lobe, not present on prior study, lesser extent in the right lower lobe.  Findings may represent infection, additional consideration is malignancy (less likely), and attention on follow-up study  Recommended.  Addendum:   Prominent left supraclavicular lymph node (3/25) is stable from most recent CT performed 2/20/2017, currently measuring 14 x 16 mm, previously 14 x 16 mm on 2/20/2017.      The portal caval lymph node/edmundo hepatis lymph node is stable from 2/20/2017, measuring 21 mm.      Clarification of size of the para-aortic lymph node (series 3 image 327). It short axis measurement is 11 mm versus 9 mm on prior study.      Hepatic segment 6 triangular-shaped low density lesion (3/362) measures 14 mm, previously measured 12 mm, demonstrating a  possible/questionable minimal subtle increase in size. Similarly the lymph nodes noted above in the supraclavicular and para-aortic regions demonstrate possible minimal possible subtle increase in size.      5/18/17: Cycle #13 Niraparib.  191.  6/15/17: Cycle #14 Niraparib.  146.  7/13/17: Cycle #15 Niraparib 200 mg.  171     CT (8/9/17):       IMPRESSION:  1. Segment 6 hepatic metastasis is stable to minimally increased in  size.  2. Slight increase in multicentric adenopathy, most pronounced at the  edmundo hepatis. Additional sites include the inferior left  neck/supraclavicular region and retroperitoneum which appear stable to  minimally increased.      8/10/17:  175  9/14/17: MUGA LVEF 54%  9/22/17: C1D1 carboplatin/Doxil.  187.  10/19/17: C2D1 carboplatin/Doxil.  108.  11/17/17: C3D1 carboplatin/Doxil.  82.  12/14/17: C4D1 carboplatin/Doxil.  83.  1/12/18: C5D1 carboplatin/Doxil.  80.  2/9/18: C6D1 carboplatin/Doxil.  71.  3/2/18:  49. Three month treatment break.  6/20/2018:  170. CT CAP:  IMPRESSION: In this patient with history of ovarian cancer:  1. Increased  size of a right hepatic lobe lesion and numerous edmundo  hepatis and retroperitoneal lymph nodes compatible with progression of  metastatic disease.  2. New mild intrahepatic biliary ductal dilatation, periportal edema,  and pericholecystic fluid. Increased soft tissue fullness in the edmundo  hepatis in the expected location of the common hepatic duct. Findings  are suspicious for developing biliary ductal obstruction secondary to  worsening metastatic disease in the edmundo hepatis. Correlation with  liver function tests is recommended. Right upper quadrant ultrasound  may be beneficial.  3. Unchanged left supraclavicular and right hilar lymphadenopathy in  the chest.     8/3/18: C1D1 weekly paclitaxel.  pending.    Past Medical History:   Diagnosis Date     Antiplatelet or antithrombotic long-term use      Ascites      Blood clot in the legs      Diabetes (H)      Ovarian cancer (H)     serous,stg IV     Pleural effusion      Pulmonary embolism (H) 2/2012     Refusal of blood transfusions as patient is Zoroastrianism      Short gut syndrome      Subclinical hypothyroidism 4/18/2013     Thrombosis of leg        Past Surgical History:   Procedure Laterality Date     COLECTOMY       COLONOSCOPY  2/1/2012    Procedure:COLONOSCOPY; With Biopsy; Surgeon:TONI SULLIVAN; Location:[facility]     HYSTERECTOMY TOTAL ABD, RHIANNA SALPINGO-OOPHORECTOMY, NODE DISSECTION, TUMOR DEBULKING, COMBINED  2/1/2012    Procedure:COMBINED HYSTERECTOMY TOTAL ABDOMINAL, BILATERAL SALPINGO-OOPHORECTOMY, NODE DISSECTION, TUMOR DEBULKING;  Exploratory Laparotomy, Total Abdominal Hysterectomy, Bilateral Salpingo-Oophorectomy, appendectomy,lysis of adhesions, ileal, ascending, transverse and splenic flexure resection, ileal descending bowel renanastomosis, incidental cystotomy repair, CUSA procedure and colonoscopy ; [facility]     INSERT PORT PERITONEAL ACCESS  4/3/2012    Procedure:INSERT PORT PERITONEAL ACCESS; Intraperitoneal Port Placement (c-arm);  Surgeon:SAMUEL CARRASCO; Location:[facility] OR     INSERT PORT PERITONEAL ACCESS  5/14/2014    Procedure: INSERT PORT PERITONEAL ACCESS;  Surgeon: Nga Yeung MD;  Location: [facility] OR     INSERT PORT VASCULAR ACCESS       LAPAROSCOPY DIAGNOSTIC (GYN)  5/14/2014    Procedure: LAPAROSCOPY DIAGNOSTIC (GYN);  Surgeon: Nga Yeung MD;  Location: [facility] OR     LAPAROTOMY EXPLORATORY Right 11/25/2015    Procedure: LAPAROTOMY EXPLORATORY;  Surgeon: Nga Yeung MD;  Location: [facility]     LAPAROTOMY, TUMOR DEBULKING, COMBINED  5/14/2014    Procedure: COMBINED LAPAROTOMY, TUMOR DEBULKING;  Surgeon: Nga Yeung MD;  Location: [facility] OR     REMOVE CATHETER PERITONEAL N/A 8/20/2014    Procedure: REMOVE CATHETER PERITONEAL;  Surgeon: Nga Yeung MD;  Location: [facility] OR     VASCULAR SURGERY      stent left iliac vein       Current Outpatient Prescriptions   Medication     Ascorbic Acid (VITAMIN C PO)     Calcium Carbonate-Vitamin D (CALCIUM + D PO)     Cholecalciferol (VITAMIN D PO)     cyanocobalamin (VITAMIN B12) 1000 MCG/ML injection     diphenoxylate-atropine (LOMOTIL) 2.5-0.025 MG per tablet     Ferrous Sulfate 324 (65 Fe) MG TBEC     HERBALS     iohexol (OMNIPAQUE) 140 MG/ML SOLN solution     iohexol (OMNIPAQUE) 140 MG/ML SOLN solution     LEVOTHYROXINE SODIUM PO     LORazepam (ATIVAN) 1 MG tablet     magnesium oxide (MAG-OX) 400 MG tablet     order for DME     potassium chloride (KLOR-CON) 20 MEQ packet     prochlorperazine (COMPAZINE) 10 MG tablet     VITAMIN E NATURAL PO     No current facility-administered medications for this visit.      Facility-Administered Medications Ordered in Other Visits   Medication     heparin 100 UNIT/ML injection 500 Units          No Known Allergies    Family History   Problem Relation Age of Onset     Cancer Mother 69     lung, smoker     Cancer Maternal Uncle 65     brain     Colon Cancer Maternal Aunt 80     colon       Social History     Social History      Marital status:      Spouse name: N/A     Number of children: N/A     Years of education: N/A     Occupational History     Not on file.     Social History Main Topics     Smoking status: Former Smoker     Years: 8.00     Types: Cigarettes     Quit date: 6/21/1980     Smokeless tobacco: Never Used      Comment: started at 11 yo and quit at 18 yo     Alcohol use Yes      Comment: 3x/day wine or jodi     Drug use: No     Sexual activity: Not on file     Other Topics Concern     Not on file     Social History Narrative       Review of Systems     Constitutional:  Negative for fever, chills, weight loss, weight gain, fatigue, decreased appetite, night sweats, recent stressors, height gain, height loss, post-operative complications, incisional pain, hallucinations, increased energy, hyperactivity and confused.   HENT:  Negative for ear pain, hearing loss, tinnitus, nosebleeds, trouble swallowing, hoarse voice, mouth sores, sore throat, ear discharge, tooth pain, gum tenderness, taste disturbance, smell disturbance, hearing aid, bleeding gums, dry mouth, sinus pain, sinus congestion and neck mass.    Eyes:  Negative for double vision, pain, redness, eye pain, decreased vision, eye watering, eye bulging, eye dryness, flashing lights, spots, floaters, strabismus, tunnel vision, jaundice and eye irritation.   Respiratory:   Negative for cough, hemoptysis, sputum production, shortness of breath, wheezing, sleep disturbances due to breathing, snores loudly, respiratory pain, dyspnea on exertion, cough disturbing sleep and postural dyspnea.    Cardiovascular:  Negative for chest pain, dyspnea on exertion, palpitations, orthopnea, claudication, leg swelling, fingers/toes turn blue, hypertension, hypotension, syncope, history of heart murmur, chest pain on exertion, chest pain at rest, pacemaker, few scattered varicosities, leg pain, sleep disturbances due to breathing, tachycardia, light-headedness, exercise intolerance  and edema.   Gastrointestinal:  Positive for diarrhea. Negative for heartburn, nausea, vomiting, abdominal pain, constipation, blood in stool, melena, rectal pain, bloating, hemorrhoids, bowel incontinence, jaundice, rectal bleeding, coffee ground emesis and change in stool.   Genitourinary:  Negative for bladder incontinence, dysuria, urgency, hematuria, flank pain, vaginal discharge, difficulty urinating, genital sores, dyspareunia, decreased libido, nocturia, voiding less frequently, arousal difficulty, abnormal vaginal bleeding, excessive menstruation, menstrual changes, hot flashes, vaginal dryness and postmenopausal bleeding.   Musculoskeletal:  Negative for myalgias, back pain, joint swelling, arthralgias, stiffness, muscle cramps, neck pain, bone pain, muscle weakness and fracture.   Skin:  Negative for nail changes, itching, poor wound healing, rash, hair changes, skin changes, acne, warts, poor wound healing, scarring, flaky skin, Raynaud's phenomenon, sensitivity to sunlight and skin thickening.   Neurological:  Negative for dizziness, tingling, tremors, speech change, seizures, loss of consciousness, weakness, light-headedness, numbness, headaches, disturbances in coordination, extremity numbness, memory loss, difficulty walking and paralysis.   Endo/Heme:  Negative for anemia, swollen glands and bruises/bleeds easily.   Psychiatric/Behavioral:  Negative for depression, hallucinations, memory loss, decreased concentration, mood swings and panic attacks.    Breast:  Negative for breast discharge, breast mass, breast pain and nipple retraction.   Endocrine:  Negative for altered temperature regulation, polyphagia, polydipsia, unwanted hair growth and change in facial hair.        Physical Exam:    /69 (BP Location: Right arm, Patient Position: Sitting, Cuff Size: Adult Regular)  Pulse 80  Temp 97.8  F (36.6  C) (Oral)  Resp 16  Wt 64.1 kg (141 lb 6.4 oz)  SpO2 99%  BMI 23.53 kg/m2    General:  Alert non-toxic appearing female in no acute distress  HEENT: Normocephalic, atraumatic; PERRLA; no scleral icterus; anicteric sclera; oropharynx pink without lesions; neck supple  Pulmonary: Lungs clear to auscultation, no increased work of breathing noted  Cardiac: Regular rate and rhythm, S1S2, no clicks, murmurs, rubs, or gallops; bilateral lower extremities without edema  GI: Normoactive bowel sounds x4 quadrants, abdomen soft, non-distended, and non-tender to palpation without masses or organomegaly; Najera's sign negative  : Not indicated  Heme: Cervical, supraclavicular, and inguinal nodes without lymphadenopathy  MSK: Moves all extremities, no obvious muscle wasting  Neuro: No gross deficits, normal gait  Skin: Appropriate color for race, warm and dry, no rashes or lesions to unclothed skin; no palmar plantar erythrodysesthesia  Psych: Pleasant and interactive, affect bright, makes appropriate eye contact, thought process linear    Labs:      8/3/2018  Day 1   Hemoglobin 11.7 - 15.7 g/dL 12.5   Hematocrit 35.0 - 47.0 % 38.3   Platelet Count 150 - 450 10e9/L 225   Absolute Neutrophil 1.6 - 8.3 10e9/L 2.7   Sodium 133 - 144 mmol/L 135   Potassium 3.4 - 5.3 mmol/L 3.6   Chloride 94 - 109 mmol/L 104   Carbon Dioxide 20 - 32 mmol/L 25   Urea Nitrogen 7 - 30 mg/dL 16   Creatinine 0.52 - 1.04 mg/dL 0.73   Calcium 8.5 - 10.1 mg/dL 9.2   Magnesium 1.6 - 2.3 mg/dL 1.4 (A)   Bilirubin Total 0.2 - 1.3 mg/dL 0.6   ALT 0 - 50 U/L 120 (A)   AST 0 - 45 U/L 87 (A)   Alkaline Phosphatase 40 - 150 U/L 395 (A)   Albumin 3.4 - 5.0 g/dL 3.5   Protein Total 6.8 - 8.8 g/dL 7.5   WBC 4.0 - 11.0 10e9/L 5.4    pending    Assessment/Plan:  1) Recurrent stage IIIC bilateral ovarian cancer: Currently feeling well. Proceed with C1D1 weekly paclitaxel as originally ordered by Dr. Yeung. To follow up in clinic for provider visit after six weeks- to call clinic in the interim with concerns. LFTs elevated- no dose reduction of  paclitaxel required. Most likely related to her disease- no new medications or hepatotoxins. Denies infectious symptoms. Continue to monitor- reviewed red flags including RUQ pain, fever, chills, vomiting, and jaundice and when to seek further care. Magnesium to be replaced intravenously in clinic, continue home supplementation. Discussed starting vitamin B6 and L-glutamine for neuropathy prophylaxis given her previous history of neuropathy with paclitaxel. Reviewed signs and symptoms for when she should contact the clinic or seek additional care, including but not limited to fever, chills, inability to keep down food or fluids, nausea and vomiting not controlled with antiemetics, and diarrhea leading to dehydration. Patient to contact the clinic with any questions or concerns in the interim.   2) Genetic counseling: Testing has been performed and she is negative for mutations in BRCA1, BRCA2, EPCAM, MLH1, MSH2, MSH6, PMS2, PTEN, and TP53 genes  3) Health maintenance issues discussed include to continue following up with PCP for non-gynecologic concerns.  4) Patient verbalized understanding of and agreement with plan    A total of 20 minutes spent with patient, over 50% spent in counseling and coordination of care.    HAILE De Paz, FNP-C  Family Nurse Practitioner  Gynecologic Oncology  [facility]  Pager: [phone]

## 2018-08-03 NOTE — PATIENT INSTRUCTIONS
August 2018 Sunday Monday Tuesday Wednesday Thursday Friday Saturday                  1     2     3  Happy Birthday!     [facility] DRAW   12:45 PM   (15 min.)   [facility]   [facility] Draw     [facility] RETURN ACTIVE TREATMENT    1:05 PM   (40 min.)   Patricia Rocha APRN CNP   [facility] 120    1:30 PM   (120 min.)   [facility]   [facility] 4       5     6     7     8     9     LEVEL 3    8:00 AM   (180 min.)   RH INFUSION CHAIR 8   [facility] 10     11       12     13     14     15     16     LEVEL 3    8:00 AM   (180 min.)   RH INFUSION CHAIR 7   [facility] 17     18       19     20     21     22     LEVEL 3    8:00 AM   (180 min.)   RH INFUSION CHAIR 2   [facility] 23     24     25       26     27     28     29     30     LEVEL 3    9:00 AM   (180 min.)   RH INFUSION CHAIR 12   [facility] 31 September 2018 Sunday Monday Tuesday Wednesday Thursday Friday Saturday                                 1       2     3     4     5     6     LEVEL 3    8:00 AM   (180 min.)   RH INFUSION CHAIR 7   [facility] 7     8       9     10     11     12     13     14     15       16     17     18     19     20     LAB WITH INFUSION    7:45 AM   (15 min.)    LAB DRAW 1   [facility]     RETURN    8:00 AM   (40 min.)   Patricia Rocha APRN CNP   [facility]     LEVEL 2    9:00 AM   (120 min.)    INFUSION CHAIR 3   [facility] 21     22       23     24     25     26     27     28     29       30                                                Lab Results:  Recent Results (from the past 12 hour(s))   CBC with platelets differential     Collection Time: 08/03/18 12:43 PM   Result Value Ref Range    WBC 5.4 4.0 - 11.0 10e9/L    RBC Count 3.73 (L) 3.8 - 5.2 10e12/L    Hemoglobin 12.5 11.7 - 15.7 g/dL    Hematocrit 38.3 35.0 - 47.0 %     (H) 78 - 100 fl    MCH 33.5 (H) 26.5 - 33.0 pg    MCHC 32.6 31.5 - 36.5 g/dL    RDW 13.4 10.0 - 15.0 %    Platelet Count 225 150 - 450 10e9/L    Diff Method Automated Method     % Neutrophils 49.5 %    % Lymphocytes 36.3 %    % Monocytes 10.6 %    % Eosinophils 2.6 %    % Basophils 0.6 %    % Immature Granulocytes 0.4 %    Nucleated RBCs 0 0 /100    Absolute Neutrophil 2.7 1.6 - 8.3 10e9/L    Absolute Lymphocytes 2.0 0.8 - 5.3 10e9/L    Absolute Monocytes 0.6 0.0 - 1.3 10e9/L    Absolute Eosinophils 0.1 0.0 - 0.7 10e9/L    Absolute Basophils 0.0 0.0 - 0.2 10e9/L    Abs Immature Granulocytes 0.0 0 - 0.4 10e9/L    Absolute Nucleated RBC 0.0    Comprehensive metabolic panel    Collection Time: 08/03/18 12:43 PM   Result Value Ref Range    Sodium 135 133 - 144 mmol/L    Potassium 3.6 3.4 - 5.3 mmol/L    Chloride 104 94 - 109 mmol/L    Carbon Dioxide 25 20 - 32 mmol/L    Anion Gap 7 3 - 14 mmol/L    Glucose 95 70 - 99 mg/dL    Urea Nitrogen 16 7 - 30 mg/dL    Creatinine 0.73 0.52 - 1.04 mg/dL    GFR Estimate 81 >60 mL/min/1.7m2    GFR Estimate If Black >90 >60 mL/min/1.7m2    Calcium 9.2 8.5 - 10.1 mg/dL    Bilirubin Total 0.6 0.2 - 1.3 mg/dL    Albumin 3.5 3.4 - 5.0 g/dL    Protein Total 7.5 6.8 - 8.8 g/dL    Alkaline Phosphatase 395 (H) 40 - 150 U/L     (H) 0 - 50 U/L    AST 87 (H) 0 - 45 U/L   Magnesium    Collection Time: 08/03/18 12:43 PM   Result Value Ref Range    Magnesium 1.4 (L) 1.6 - 2.3 mg/dL

## 2018-08-06 NOTE — PROGRESS NOTES
Reviewed.  Tammy CISNEROS RN, OCN  Care Coordinator   Gynecologic Cancer   Office [phone]  Pager [phone]

## 2018-08-07 ENCOUNTER — MYC MEDICAL ADVICE (OUTPATIENT)
Dept: ONCOLOGY | Facility: CLINIC | Age: 58
End: 2018-08-07

## 2018-08-07 DIAGNOSIS — R10.13 DYSPEPSIA: Primary | ICD-10-CM

## 2018-08-09 ENCOUNTER — INFUSION THERAPY VISIT (OUTPATIENT)
Dept: INFUSION THERAPY | Facility: CLINIC | Age: 58
End: 2018-08-09
Attending: OBSTETRICS & GYNECOLOGY
Payer: COMMERCIAL

## 2018-08-09 ENCOUNTER — HOSPITAL ENCOUNTER (OUTPATIENT)
Facility: CLINIC | Age: 58
Setting detail: SPECIMEN
Discharge: HOME | End: 2018-08-09
Attending: OBSTETRICS & GYNECOLOGY | Admitting: OBSTETRICS & GYNECOLOGY
Payer: COMMERCIAL

## 2018-08-09 VITALS
SYSTOLIC BLOOD PRESSURE: 113 MMHG | OXYGEN SATURATION: 99 % | RESPIRATION RATE: 16 BRPM | HEART RATE: 79 BPM | TEMPERATURE: 97.1 F | DIASTOLIC BLOOD PRESSURE: 66 MMHG | WEIGHT: 140.6 LBS | BODY MASS INDEX: 23.4 KG/M2

## 2018-08-09 DIAGNOSIS — C56.1 OVARIAN CANCER, RIGHT (H): ICD-10-CM

## 2018-08-09 DIAGNOSIS — Z79.899 ENCOUNTER FOR LONG-TERM (CURRENT) USE OF MEDICATIONS: Primary | ICD-10-CM

## 2018-08-09 DIAGNOSIS — D70.2 DRUG-INDUCED NEUTROPENIA (H): ICD-10-CM

## 2018-08-09 DIAGNOSIS — C56.2 OVARIAN CANCER, LEFT (H): ICD-10-CM

## 2018-08-09 LAB
BASOPHILS # BLD AUTO: 0 10E9/L (ref 0–0.2)
BASOPHILS NFR BLD AUTO: 0.5 %
DIFFERENTIAL METHOD BLD: ABNORMAL
EOSINOPHIL # BLD AUTO: 0.2 10E9/L (ref 0–0.7)
EOSINOPHIL NFR BLD AUTO: 3.1 %
ERYTHROCYTE [DISTWIDTH] IN BLOOD BY AUTOMATED COUNT: 13.2 % (ref 10–15)
HCT VFR BLD AUTO: 37.5 % (ref 35–47)
HGB BLD-MCNC: 12.3 G/DL (ref 11.7–15.7)
IMM GRANULOCYTES # BLD: 0 10E9/L (ref 0–0.4)
IMM GRANULOCYTES NFR BLD: 0.5 %
LYMPHOCYTES # BLD AUTO: 2.1 10E9/L (ref 0.8–5.3)
LYMPHOCYTES NFR BLD AUTO: 36.7 %
MAGNESIUM SERPL-MCNC: 1.2 MG/DL (ref 1.6–2.3)
MCH RBC QN AUTO: 34 PG (ref 26.5–33)
MCHC RBC AUTO-ENTMCNC: 32.8 G/DL (ref 31.5–36.5)
MCV RBC AUTO: 104 FL (ref 78–100)
MONOCYTES # BLD AUTO: 0.4 10E9/L (ref 0–1.3)
MONOCYTES NFR BLD AUTO: 6.9 %
NEUTROPHILS # BLD AUTO: 3 10E9/L (ref 1.6–8.3)
NEUTROPHILS NFR BLD AUTO: 52.3 %
NRBC # BLD AUTO: 0 10*3/UL
NRBC BLD AUTO-RTO: 0 /100
PLATELET # BLD AUTO: 237 10E9/L (ref 150–450)
POTASSIUM SERPL-SCNC: 3.7 MMOL/L (ref 3.4–5.3)
RBC # BLD AUTO: 3.62 10E12/L (ref 3.8–5.2)
WBC # BLD AUTO: 5.8 10E9/L (ref 4–11)

## 2018-08-09 PROCEDURE — 25000128 H RX IP 250 OP 636: Performed by: OBSTETRICS & GYNECOLOGY

## 2018-08-09 PROCEDURE — 84132 ASSAY OF SERUM POTASSIUM: CPT | Performed by: OBSTETRICS & GYNECOLOGY

## 2018-08-09 PROCEDURE — 85025 COMPLETE CBC W/AUTO DIFF WBC: CPT | Performed by: OBSTETRICS & GYNECOLOGY

## 2018-08-09 PROCEDURE — 96413 CHEMO IV INFUSION 1 HR: CPT

## 2018-08-09 PROCEDURE — 96375 TX/PRO/DX INJ NEW DRUG ADDON: CPT

## 2018-08-09 PROCEDURE — 83735 ASSAY OF MAGNESIUM: CPT | Performed by: OBSTETRICS & GYNECOLOGY

## 2018-08-09 PROCEDURE — 96368 THER/DIAG CONCURRENT INF: CPT

## 2018-08-09 PROCEDURE — 25000125 ZZHC RX 250: Performed by: OBSTETRICS & GYNECOLOGY

## 2018-08-09 RX ORDER — HEPARIN SODIUM (PORCINE) LOCK FLUSH IV SOLN 100 UNIT/ML 100 UNIT/ML
500 SOLUTION INTRAVENOUS EVERY 8 HOURS PRN
Status: DISCONTINUED | OUTPATIENT
Start: 2018-08-09 | End: 2018-08-09 | Stop reason: HOSPADM

## 2018-08-09 RX ORDER — MAGNESIUM SULFATE HEPTAHYDRATE 40 MG/ML
2 INJECTION, SOLUTION INTRAVENOUS ONCE
Status: COMPLETED | OUTPATIENT
Start: 2018-08-09 | End: 2018-08-09

## 2018-08-09 RX ADMIN — DIPHENHYDRAMINE HYDROCHLORIDE 25 MG: 50 INJECTION, SOLUTION INTRAMUSCULAR; INTRAVENOUS at 09:46

## 2018-08-09 RX ADMIN — SODIUM CHLORIDE 250 ML: 9 INJECTION, SOLUTION INTRAVENOUS at 08:59

## 2018-08-09 RX ADMIN — PACLITAXEL 138 MG: 6 INJECTION, SOLUTION INTRAVENOUS at 10:27

## 2018-08-09 RX ADMIN — DEXAMETHASONE SODIUM PHOSPHATE 12 MG: 10 INJECTION, SOLUTION INTRAMUSCULAR; INTRAVENOUS at 10:00

## 2018-08-09 RX ADMIN — FAMOTIDINE 40 MG: 10 INJECTION INTRAVENOUS at 09:26

## 2018-08-09 RX ADMIN — SODIUM CHLORIDE, PRESERVATIVE FREE 500 UNITS: 5 INJECTION INTRAVENOUS at 11:37

## 2018-08-09 RX ADMIN — MAGNESIUM SULFATE HEPTAHYDRATE 2 G: 40 INJECTION, SOLUTION INTRAVENOUS at 10:23

## 2018-08-09 ASSESSMENT — PAIN SCALES - GENERAL: PAINLEVEL: NO PAIN (0)

## 2018-08-09 NOTE — PATIENT INSTRUCTIONS
EDUCATION POST CHEMOTHERAPY INFUSION  Call the triage nurse at your clinic or seek medical attention if you have chills and/or temperature greater than or equal to 100.5, uncontrolled nausea/vomiting, diarrhea, constipation, dizziness, shortness of breath, chest pain, heart palpitations, weakness or any other new or concerning symptoms, questions or concerns.  You can not have any live virus vaccines prior to or during treatment or up to 6 months post infusion.  If you have an upcoming surgery, medical procedure or dental procedure during treatment, this should be discussed with your ordering physician and your surgeon/dentist.  If you are having any concerning symptom, if you are unsure if you should get your next infusion or wish to speak to a provider before your next infusion, please call your care coordinator or triage nurse at your clinic to notify them so we can adequately serve you.

## 2018-08-09 NOTE — PROGRESS NOTES
Infusion Nursing Note:  Odalys Nathan presents today for C1D8 Taxol.    Patient seen by provider today: No   present during visit today: Not Applicable.    Note: Magnesium replaced today.    Intravenous Access:  Labs drawn without difficulty.  Implanted Port.    Treatment Conditions:  Lab Results   Component Value Date    HGB 12.3 08/09/2018     Lab Results   Component Value Date    WBC 5.8 08/09/2018      Lab Results   Component Value Date    ANEU 3.0 08/09/2018     Lab Results   Component Value Date     08/09/2018      Results reviewed, labs MET treatment parameters, ok to proceed with treatment.    Post Infusion Assessment:  Patient tolerated infusion without incident.  Blood return noted pre and post infusion.  Site patent and intact, free from redness, edema or discomfort.  No evidence of extravasations.  Access discontinued per protocol.    Discharge Plan:   Discharge instructions reviewed with: Patient.  Patient and/or family verbalized understanding of discharge instructions and all questions answered.  AVS to patient via [facility].  Patient will return 8/16/18 for Taxol for next appointment.   Patient discharged in stable condition accompanied by: .  Departure Mode: Ambulatory.    Elly Mcfadden RN

## 2018-08-09 NOTE — MR AVS SNAPSHOT
After Visit Summary   8/9/2018    Odalys Nathan    MRN: [id number]           Patient Information     Date Of Birth          1960        Visit Information        Provider Department      8/9/2018 8:00 AM RH INFUSION CHAIR 8 [facility]        Today's Diagnoses     Encounter for long-term (current) use of medications    -  1    Ovarian cancer, right (H)        Ovarian cancer, left (H)        Drug-induced neutropenia (H)          Care Instructions    EDUCATION POST CHEMOTHERAPY INFUSION  Call the triage nurse at your clinic or seek medical attention if you have chills and/or temperature greater than or equal to 100.5, uncontrolled nausea/vomiting, diarrhea, constipation, dizziness, shortness of breath, chest pain, heart palpitations, weakness or any other new or concerning symptoms, questions or concerns.  You can not have any live virus vaccines prior to or during treatment or up to 6 months post infusion.  If you have an upcoming surgery, medical procedure or dental procedure during treatment, this should be discussed with your ordering physician and your surgeon/dentist.  If you are having any concerning symptom, if you are unsure if you should get your next infusion or wish to speak to a provider before your next infusion, please call your care coordinator or triage nurse at your clinic to notify them so we can adequately serve you.          Follow-ups after your visit        Your next 10 appointments already scheduled     Aug 16, 2018  8:00 AM CDT   Level 3 with RH INFUSION CHAIR 7   [facility] ([facility])    [facility]  [address]  [facility] [address]   [phone]            Aug 22, 2018  8:00 AM CDT   Level 3 with RH INFUSION CHAIR 2   [facility] ([facility])    [facility]  [address]  [facility] [address]   [phone]            Aug 30, 2018  9:00 AM CDT   Level 3 with RH INFUSION CHAIR 12   [facility] ([facility])    [facility]  [address]  [facility] [address]   [phone]            Sep 06, 2018  8:00 AM CDT   Level 3 with RH INFUSION CHAIR 7   [facility] ([facility])    [facility] [place] [address]  [facility] [address]   [phone]            Sep 20, 2018  7:45 AM CDT   Lab with Infusion with RH LAB DRAW 1   [facility] ([facility])    [facility]  [address]  [facility] [address]   [phone]            Sep 20, 2018  8:00 AM CDT   Return Visit with HAILE De Paz [facility] ([facility])    [facility] [place]  [address]  [facility] [address]   [phone]            Sep 20, 2018  9:00 AM CDT   Level 2 with RH INFUSION CHAIR 3   [facility] ([facility])    [facility] [place]  [address]  [facility] [address]   [phone]            Oct 04, 2018  8:00 AM CDT   Level 3 with RH INFUSION CHAIR 1   [facility] ([facility])    [facility] [place]  [address]  [facility] [address]   [phone]            Oct 11, 2018  8:00 AM CDT   Level 3 with RH INFUSION CHAIR 4   [facility] ([facility])    [facility]  [address]  [facility] [address]   [phone]            Oct 18, 2018  8:00 AM CDT   Level 3 with RH INFUSION CHAIR 4   [facility] ([facility])    [facility] [place]  [address] [place] [address]  [facility] [address] [phone]              Who to contact     If you have questions or need follow up information about today's clinic visit or your schedule please contact [facility] INFUSION SERVICES directly at [phone].  Normal or non-critical lab and imaging results will be communicated to you by MyChart, letter or phone within 4 business days after the clinic has received the results. If you do not hear from us within 7 days, please contact the clinic through [employer] or phone. If you have a critical or abnormal lab result, we will notify you by phone as soon as possible.  Submit refill requests through [employer] or call your pharmacy and they will forward the refill request to us. Please allow 3 business days for your refill to be completed.          Additional Information About Your Visit        [employer] Information     [employer] gives you secure access to your electronic health record. If you see a primary care provider, you can also send messages to your care team and make appointments. If you have questions, please call your primary care clinic.  If you do not have a primary care provider, please call [phone] and they will assist you.        Care EveryWhere ID     This is your Care EveryWhere ID. This could be used by other organizations to access your [place] medical records  [id number]        Your Vitals Were     Pulse Temperature Respirations Pulse Oximetry BMI (Body Mass Index)       79 97.1  F (36.2  C) 16 99% 23.4 kg/m2        Blood Pressure from Last 3 Encounters:   08/09/18 113/66   08/03/18 119/69   06/21/18 129/85    Weight from Last 3 Encounters:   08/09/18 63.8 kg (140 lb 9.6 oz)   08/03/18 64.1 kg (141 lb 6.4 oz)   06/21/18 64.2 kg (141 lb 9.6 oz)              We Performed the Following     CBC with platelets differential     Magnesium     Potassium        Primary Care Provider Office  Phone # Fax #    Aubrie Hester [phone]       [facility] [address] [address] [facility] [address]        Equal Access to Services     ABIMBOLAMATEO TRISH : Hadii aad ku hadvincent Walter, wasarada luqadaha, qaybta kaalmada bel, blanka sims laAndresmarquise szymanski. So [facility] [phone].    ATENCIÓN: Si habla español, tiene a bell disposición servicios gratuitos de asistencia lingüística. Brijeshame al [phone].    We comply with applicable federal civil rights laws and [place] laws. We do not discriminate on the basis of race, color, national origin, age, disability, sex, sexual orientation, or gender identity.            Thank you!     Thank you for choosing [facility] INFUSION SERVICES  for your care. Our goal is always to provide you with excellent care. Hearing back from our patients is one way we can continue to improve our services. Please take a few minutes to complete the written survey that you may receive in the mail after your visit with us. Thank you!             Your Updated Medication List - Protect others around you: Learn how to safely use, store and throw away your medicines at www.disposemymeds.org.          This list is accurate as of 8/9/18  1:36 PM.  Always use your most recent med list.                   Brand Name Dispense Instructions for use Diagnosis    CALCIUM + D PO      Take 1 tablet by mouth daily.    Pelvic mass       cyanocobalamin 1000 MCG/ML injection    VITAMIN B12    1 mL    Inject 1 mL (1,000 mcg) into the muscle every 30 days    B12 deficiency       diphenoxylate-atropine 2.5-0.025 MG per tablet    LOMOTIL    60 tablet    Take 2 tablets by mouth 4 times daily as needed for diarrhea    Acute diarrhea       Ferrous Sulfate 324 (65 Fe) MG Tbec     90 tablet    Take 1 tablet by mouth 3 times daily (with meals). Take with a small amount of orange juice. Do not take with calcium.    Ovarian cancer, right (H), Anemia, unspecified  type, Ovarian cancer, left (H)       HERBALS      daily        * iohexol 140 MG/ML Soln solution    OMNIPAQUE    140 mL    Mix entire bottle (50ml) of contast with 600ml (20 ounces) of water and drink half 2 hrs prior to CT scan and half 1 hr prior to scan    Ovarian cancer, right (H), Metastatic cancer (H)       * iohexol 140 MG/ML Soln solution    OMNIPAQUE    50 mL    Mix entire bottle (50ml) of contast with 600ml (20 ounces) of water and drink half 2 hrs prior to CT scan and half 1 hr prior to scan    Personal history of ovarian cancer       LEVOTHYROXINE SODIUM PO      Take by mouth daily        * LORazepam 1 MG tablet    ATIVAN    30 tablet    Take 1 tablet (1 mg) by mouth every 6 hours as needed (Anxiety, Nausea/Vomiting or Sleep)    Ovarian cancer, unspecified laterality (H)       * LORazepam 1 MG tablet    ATIVAN    30 tablet    Take 1 tablet (1 mg) by mouth every 6 hours as needed (Anxiety, Nausea/Vomiting or Sleep)    Encounter for long-term (current) use of medications, Ovarian cancer, right (H), Ovarian cancer, left (H)       magnesium oxide 400 MG tablet    MAG-OX    90 tablet    Take 1 tablet (400 mg) by mouth 2 times daily    Ovarian cancer, unspecified laterality (H)       omeprazole 20 MG CR capsule    priLOSEC    30 capsule    Take 1 capsule (20 mg) by mouth daily    Dyspepsia       order for DME     3 each    Injection Supplies for Vitamin B12: 3cc syringes w/ 27 gauge needles, 1/2 inch length    B12 deficiency       potassium chloride 20 MEQ Packet    KLOR-CON     Take 20 mEq by mouth daily        * prochlorperazine 10 MG tablet    COMPAZINE    30 tablet    Take 1 tablet (10 mg) by mouth every 6 hours as needed (nausea/vomiting)    Encounter for long-term (current) use of medications, Ovarian cancer, right (H), Ovarian cancer, left (H), Drug-induced neutropenia (H)       * prochlorperazine 10 MG tablet    COMPAZINE    30 tablet    Take 1 tablet (10 mg) by mouth every 6 hours as needed  (Nausea/Vomiting)    Encounter for long-term (current) use of medications, Ovarian cancer, right (H), Ovarian cancer, left (H), Drug-induced neutropenia (H)       VITAMIN C PO      Take 500 mg by mouth daily    Pelvic mass       VITAMIN E NATURAL PO      Take 100 Units by mouth daily        * Notice:  This list has 6 medication(s) that are the same as other medications prescribed for you. Read the directions carefully, and ask your doctor or other care provider to review them with you.

## 2018-08-14 DIAGNOSIS — D64.9 ANEMIA, UNSPECIFIED TYPE: ICD-10-CM

## 2018-08-14 DIAGNOSIS — C56.2 OVARIAN CANCER, LEFT (H): ICD-10-CM

## 2018-08-14 DIAGNOSIS — C56.1 OVARIAN CANCER, RIGHT (H): ICD-10-CM

## 2018-08-15 RX ORDER — FERROUS SULFATE 324(65)MG
TABLET, DELAYED RELEASE (ENTERIC COATED) ORAL
Qty: 90 TABLET | Refills: 0 | Status: SHIPPED | OUTPATIENT
Start: 2018-08-15 | End: 2018-09-12

## 2018-08-16 ENCOUNTER — INFUSION THERAPY VISIT (OUTPATIENT)
Dept: INFUSION THERAPY | Facility: CLINIC | Age: 58
End: 2018-08-16
Attending: OBSTETRICS & GYNECOLOGY
Payer: COMMERCIAL

## 2018-08-16 ENCOUNTER — HOSPITAL ENCOUNTER (OUTPATIENT)
Facility: CLINIC | Age: 58
Setting detail: SPECIMEN
Discharge: HOME | End: 2018-08-16
Attending: OBSTETRICS & GYNECOLOGY | Admitting: OBSTETRICS & GYNECOLOGY
Payer: COMMERCIAL

## 2018-08-16 VITALS
HEART RATE: 76 BPM | RESPIRATION RATE: 16 BRPM | DIASTOLIC BLOOD PRESSURE: 54 MMHG | BODY MASS INDEX: 23.46 KG/M2 | WEIGHT: 141 LBS | TEMPERATURE: 97.7 F | SYSTOLIC BLOOD PRESSURE: 105 MMHG

## 2018-08-16 DIAGNOSIS — C56.1 OVARIAN CANCER, RIGHT (H): ICD-10-CM

## 2018-08-16 DIAGNOSIS — C56.2 OVARIAN CANCER, LEFT (H): ICD-10-CM

## 2018-08-16 DIAGNOSIS — Z79.899 ENCOUNTER FOR LONG-TERM (CURRENT) USE OF MEDICATIONS: Primary | ICD-10-CM

## 2018-08-16 DIAGNOSIS — D70.2 DRUG-INDUCED NEUTROPENIA (H): ICD-10-CM

## 2018-08-16 LAB
BASOPHILS # BLD AUTO: 0 10E9/L (ref 0–0.2)
BASOPHILS NFR BLD AUTO: 0.4 %
DIFFERENTIAL METHOD BLD: ABNORMAL
EOSINOPHIL # BLD AUTO: 0.2 10E9/L (ref 0–0.7)
EOSINOPHIL NFR BLD AUTO: 4.1 %
ERYTHROCYTE [DISTWIDTH] IN BLOOD BY AUTOMATED COUNT: 13.5 % (ref 10–15)
HCT VFR BLD AUTO: 35.5 % (ref 35–47)
HGB BLD-MCNC: 11.6 G/DL (ref 11.7–15.7)
IMM GRANULOCYTES # BLD: 0 10E9/L (ref 0–0.4)
IMM GRANULOCYTES NFR BLD: 0.6 %
LYMPHOCYTES # BLD AUTO: 2 10E9/L (ref 0.8–5.3)
LYMPHOCYTES NFR BLD AUTO: 41.4 %
MAGNESIUM SERPL-MCNC: 1.1 MG/DL (ref 1.6–2.3)
MCH RBC QN AUTO: 34 PG (ref 26.5–33)
MCHC RBC AUTO-ENTMCNC: 32.7 G/DL (ref 31.5–36.5)
MCV RBC AUTO: 104 FL (ref 78–100)
MONOCYTES # BLD AUTO: 0.4 10E9/L (ref 0–1.3)
MONOCYTES NFR BLD AUTO: 8 %
NEUTROPHILS # BLD AUTO: 2.2 10E9/L (ref 1.6–8.3)
NEUTROPHILS NFR BLD AUTO: 45.5 %
NRBC # BLD AUTO: 0 10*3/UL
NRBC BLD AUTO-RTO: 0 /100
PLATELET # BLD AUTO: 253 10E9/L (ref 150–450)
POTASSIUM SERPL-SCNC: 3.8 MMOL/L (ref 3.4–5.3)
RBC # BLD AUTO: 3.41 10E12/L (ref 3.8–5.2)
WBC # BLD AUTO: 4.9 10E9/L (ref 4–11)

## 2018-08-16 PROCEDURE — 84132 ASSAY OF SERUM POTASSIUM: CPT | Performed by: OBSTETRICS & GYNECOLOGY

## 2018-08-16 PROCEDURE — 25000125 ZZHC RX 250: Performed by: OBSTETRICS & GYNECOLOGY

## 2018-08-16 PROCEDURE — 96366 THER/PROPH/DIAG IV INF ADDON: CPT

## 2018-08-16 PROCEDURE — 25000128 H RX IP 250 OP 636: Performed by: OBSTETRICS & GYNECOLOGY

## 2018-08-16 PROCEDURE — 96413 CHEMO IV INFUSION 1 HR: CPT

## 2018-08-16 PROCEDURE — 85025 COMPLETE CBC W/AUTO DIFF WBC: CPT | Performed by: OBSTETRICS & GYNECOLOGY

## 2018-08-16 PROCEDURE — 83735 ASSAY OF MAGNESIUM: CPT | Performed by: OBSTETRICS & GYNECOLOGY

## 2018-08-16 PROCEDURE — 96367 TX/PROPH/DG ADDL SEQ IV INF: CPT

## 2018-08-16 PROCEDURE — 96375 TX/PRO/DX INJ NEW DRUG ADDON: CPT

## 2018-08-16 RX ADMIN — DIPHENHYDRAMINE HYDROCHLORIDE 25 MG: 50 INJECTION, SOLUTION INTRAMUSCULAR; INTRAVENOUS at 08:57

## 2018-08-16 RX ADMIN — MAGNESIUM SULFATE HEPTAHYDRATE 4 G: 500 INJECTION, SOLUTION INTRAMUSCULAR; INTRAVENOUS at 09:48

## 2018-08-16 RX ADMIN — PACLITAXEL 138 MG: 6 INJECTION, SOLUTION INTRAVENOUS at 09:44

## 2018-08-16 RX ADMIN — SODIUM CHLORIDE 250 ML: 9 INJECTION, SOLUTION INTRAVENOUS at 08:56

## 2018-08-16 RX ADMIN — DEXAMETHASONE SODIUM PHOSPHATE 12 MG: 10 INJECTION, SOLUTION INTRAMUSCULAR; INTRAVENOUS at 09:09

## 2018-08-16 RX ADMIN — FAMOTIDINE 40 MG: 10 INJECTION INTRAVENOUS at 09:27

## 2018-08-16 NOTE — MR AVS SNAPSHOT
After Visit Summary   8/16/2018    Odalys Nathan    MRN: [id number]           Patient Information     Date Of Birth          1960        Visit Information        Provider Department      8/16/2018 8:00 AM RH INFUSION CHAIR 7 [facility]        Today's Diagnoses     Encounter for long-term (current) use of medications    -  1    Ovarian cancer, right (H)        Ovarian cancer, left (H)        Drug-induced neutropenia (H)           Follow-ups after your visit        Your next 10 appointments already scheduled     Aug 22, 2018  8:00 AM CDT   Level 3 with RH INFUSION CHAIR 2   [facility] ([facility])    [facility]  [address]  [place] [address]   [phone]            Aug 30, 2018  9:00 AM CDT   Level 3 with RH INFUSION CHAIR 12   [facility] ([facility])    [facility]  [address]  [place] [address]   [phone]            Sep 06, 2018  8:00 AM CDT   Level 3 with RH INFUSION CHAIR 7   [facility] ([facility])    [facility]  [address]  [place] [address]   [phone]            Sep 20, 2018  7:45 AM CDT   Lab with Infusion with RH LAB DRAW 1   [facility] ([facility])    [facility]  [address]  [place] [address]   [phone]            Sep 20, 2018  8:00 AM CDT   Return Visit with HAILE De Paz [facility] ([facility])    [facility]  [address]  [place] [address]   [phone]            Sep 20, 2018  9:00 AM CDT   Level 2 with RH INFUSION CHAIR 3   [facility]  ([facility])    [facility]  [address]  [facility] [address]   [phone]            Oct 04, 2018  8:00 AM CDT   Level 3 with RH INFUSION CHAIR 1   [facility] Infusion Services ([facility])    [facility]  [address]  [facility] [address]   [phone]            Oct 11, 2018  8:00 AM CDT   Level 3 with RH INFUSION CHAIR 4   [facility] ([facility])    [facility]  [address]  [facility] [address]   [phone]            Oct 18, 2018  8:00 AM CDT   Level 3 with RH INFUSION CHAIR 4   [facility] ([facility])    [facility]  [address]  [facility] [address]   [phone]            Oct 25, 2018  8:00 AM CDT   Level 3 with RH INFUSION CHAIR 4   [facility] ([facility])    [facility] [place] [address]  [facility] [address]   [phone]              Who to contact     If you have questions or need follow up information about today's clinic visit or your schedule please contact [facility] INFUSION SERVICES directly at [phone].  Normal or non-critical lab and imaging results will be communicated to you by MyChart, letter or phone within 4 business days after the clinic has received the results. If you do not hear from us within 7 days, please contact the clinic through MyChart or phone. If you have a critical or abnormal lab result, we will notify you by phone as soon as possible.  Submit refill requests through [employer] or call your pharmacy and they will forward the refill request to us. Please allow 3 business days for your refill to be completed.          Additional Information About Your Visit        [facility]  Information     [employer] gives you secure access to your electronic health record. If you see a primary care provider, you can also send messages to your care team and make appointments. If you have questions, please call your primary care clinic.  If you do not have a primary care provider, please call [phone] and they will assist you.        Care EveryWhere ID     This is your Care EveryWhere ID. This could be used by other organizations to access your [place] medical records  [id number]        Your Vitals Were     Pulse Temperature Respirations BMI (Body Mass Index)          76 97.7  F (36.5  C) (Tympanic) 16 23.46 kg/m2         Blood Pressure from Last 3 Encounters:   08/16/18 105/54   08/09/18 113/66   08/03/18 119/69    Weight from Last 3 Encounters:   08/16/18 64 kg (141 lb)   08/09/18 63.8 kg (140 lb 9.6 oz)   08/03/18 64.1 kg (141 lb 6.4 oz)              We Performed the Following     CBC with platelets differential     Magnesium     Potassium        Primary Care Provider Office Phone # Fax #    Aubrie Hester [phone]       [facility] [address] [facility] [address]        Equal Access to Services     SANDY CHAMBERS : Hadii aad ku hadasho Soomaali, waaxda luqadaha, qaybta kaalmada adeegyada, blanka szymanski. So [facility] [phone].    ATENCIÓN: Si habla español, tiene a bell disposición servicios gratuitos de asistencia lingüística. Llame al [phone].    We comply with applicable federal civil rights laws and [place] laws. We do not discriminate on the basis of race, color, national origin, age, disability, sex, sexual orientation, or gender identity.            Thank you!     Thank you for choosing  INFUSION SERVICES  for your care. Our goal is always to provide you with excellent care. Hearing back from our patients is one way we can continue to improve our services. Please take a few minutes to  complete the written survey that you may receive in the mail after your visit with us. Thank you!             Your Updated Medication List - Protect others around you: Learn how to safely use, store and throw away your medicines at www.disposemymeds.org.          This list is accurate as of 8/16/18  3:32 PM.  Always use your most recent med list.                   Brand Name Dispense Instructions for use Diagnosis    CALCIUM + D PO      Take 1 tablet by mouth daily.    Pelvic mass       cyanocobalamin 1000 MCG/ML injection    VITAMIN B12    1 mL    Inject 1 mL (1,000 mcg) into the muscle every 30 days    B12 deficiency       diphenoxylate-atropine 2.5-0.025 MG per tablet    LOMOTIL    60 tablet    Take 2 tablets by mouth 4 times daily as needed for diarrhea    Acute diarrhea       Ferrous Sulfate 324 (65 Fe) MG Tbec     90 tablet    Take 1 tablet by mouth 3 times daily (with meals). Take with a small amount of orange juice. Do not take with calcium.    Ovarian cancer, right (H), Anemia, unspecified type, Ovarian cancer, left (H)       HERBALS      daily        * iohexol 140 MG/ML Soln solution    OMNIPAQUE    140 mL    Mix entire bottle (50ml) of contast with 600ml (20 ounces) of water and drink half 2 hrs prior to CT scan and half 1 hr prior to scan    Ovarian cancer, right (H), Metastatic cancer (H)       * iohexol 140 MG/ML Soln solution    OMNIPAQUE    50 mL    Mix entire bottle (50ml) of contast with 600ml (20 ounces) of water and drink half 2 hrs prior to CT scan and half 1 hr prior to scan    Personal history of ovarian cancer       LEVOTHYROXINE SODIUM PO      Take by mouth daily        * LORazepam 1 MG tablet    ATIVAN    30 tablet    Take 1 tablet (1 mg) by mouth every 6 hours as needed (Anxiety, Nausea/Vomiting or Sleep)    Ovarian cancer, unspecified laterality (H)       * LORazepam 1 MG tablet    ATIVAN    30 tablet    Take 1 tablet (1 mg) by mouth every 6 hours as needed (Anxiety, Nausea/Vomiting or  Sleep)    Encounter for long-term (current) use of medications, Ovarian cancer, right (H), Ovarian cancer, left (H)       magnesium oxide 400 MG tablet    MAG-OX    90 tablet    Take 1 tablet (400 mg) by mouth 2 times daily    Ovarian cancer, unspecified laterality (H)       omeprazole 20 MG CR capsule    priLOSEC    30 capsule    Take 1 capsule (20 mg) by mouth daily    Dyspepsia       order for DME     3 each    Injection Supplies for Vitamin B12: 3cc syringes w/ 27 gauge needles, 1/2 inch length    B12 deficiency       potassium chloride 20 MEQ Packet    KLOR-CON     Take 20 mEq by mouth daily        * prochlorperazine 10 MG tablet    COMPAZINE    30 tablet    Take 1 tablet (10 mg) by mouth every 6 hours as needed (nausea/vomiting)    Encounter for long-term (current) use of medications, Ovarian cancer, right (H), Ovarian cancer, left (H), Drug-induced neutropenia (H)       * prochlorperazine 10 MG tablet    COMPAZINE    30 tablet    Take 1 tablet (10 mg) by mouth every 6 hours as needed (Nausea/Vomiting)    Encounter for long-term (current) use of medications, Ovarian cancer, right (H), Ovarian cancer, left (H), Drug-induced neutropenia (H)       VITAMIN C PO      Take 500 mg by mouth daily    Pelvic mass       VITAMIN E NATURAL PO      Take 100 Units by mouth daily        * Notice:  This list has 6 medication(s) that are the same as other medications prescribed for you. Read the directions carefully, and ask your doctor or other care provider to review them with you.

## 2018-08-16 NOTE — PROGRESS NOTES
Infusion Nursing Note:  Odalys Nathan presents today for taxol.    Patient seen by provider today: No   present during visit today: Not Applicable.    Note: Magnesium replaced per protocol.    Intravenous Access:  Labs drawn without difficulty.  Implanted Port.    Treatment Conditions:  Lab Results   Component Value Date    HGB 11.6 08/16/2018     Lab Results   Component Value Date    WBC 4.9 08/16/2018      Lab Results   Component Value Date    ANEU 2.2 08/16/2018     Lab Results   Component Value Date     08/16/2018      Lab Results   Component Value Date     08/03/2018                   Lab Results   Component Value Date    POTASSIUM 3.8 08/16/2018           Lab Results   Component Value Date    MAG 1.1 08/16/2018            Lab Results   Component Value Date    CR 0.73 08/03/2018                   Lab Results   Component Value Date    JOSE ALBERTO 9.2 08/03/2018                Lab Results   Component Value Date    BILITOTAL 0.6 08/03/2018           Lab Results   Component Value Date    ALBUMIN 3.5 08/03/2018                    Lab Results   Component Value Date     08/03/2018           Lab Results   Component Value Date    AST 87 08/03/2018       Results reviewed, labs MET treatment parameters, ok to proceed with treatment.      Post Infusion Assessment:  Patient tolerated infusion without incident.  Blood return noted pre and post infusion.  Site patent and intact, free from redness, edema or discomfort.  No evidence of extravasations.  Access discontinued per protocol.    Discharge Plan:   Patient declined prescription refills.  Discharge instructions reviewed with: Patient.  Patient and/or family verbalized understanding of discharge instructions and all questions answered.  Copy of AVS reviewed with patient and/or family.  Patient will return 8/22/18 for next appointment.  Patient discharged in stable condition accompanied by: .  Departure Mode: Ambulatory.    Payton  Gee, AKBAR

## 2018-08-22 ENCOUNTER — INFUSION THERAPY VISIT (OUTPATIENT)
Dept: INFUSION THERAPY | Facility: CLINIC | Age: 58
End: 2018-08-22
Attending: OBSTETRICS & GYNECOLOGY
Payer: COMMERCIAL

## 2018-08-22 ENCOUNTER — HOSPITAL ENCOUNTER (OUTPATIENT)
Facility: CLINIC | Age: 58
Setting detail: SPECIMEN
Discharge: HOME | End: 2018-08-22
Attending: OBSTETRICS & GYNECOLOGY | Admitting: OBSTETRICS & GYNECOLOGY
Payer: COMMERCIAL

## 2018-08-22 VITALS
DIASTOLIC BLOOD PRESSURE: 69 MMHG | OXYGEN SATURATION: 98 % | TEMPERATURE: 98.3 F | SYSTOLIC BLOOD PRESSURE: 107 MMHG | BODY MASS INDEX: 23.48 KG/M2 | HEART RATE: 84 BPM | RESPIRATION RATE: 16 BRPM | WEIGHT: 141.09 LBS

## 2018-08-22 DIAGNOSIS — C56.1 OVARIAN CANCER, RIGHT (H): ICD-10-CM

## 2018-08-22 DIAGNOSIS — C56.2 OVARIAN CANCER, LEFT (H): ICD-10-CM

## 2018-08-22 DIAGNOSIS — Z79.899 ENCOUNTER FOR LONG-TERM (CURRENT) USE OF MEDICATIONS: Primary | ICD-10-CM

## 2018-08-22 DIAGNOSIS — D70.2 DRUG-INDUCED NEUTROPENIA (H): ICD-10-CM

## 2018-08-22 LAB
BASOPHILS # BLD AUTO: 0 10E9/L (ref 0–0.2)
BASOPHILS NFR BLD AUTO: 0.7 %
DIFFERENTIAL METHOD BLD: ABNORMAL
EOSINOPHIL # BLD AUTO: 0.2 10E9/L (ref 0–0.7)
EOSINOPHIL NFR BLD AUTO: 3.5 %
ERYTHROCYTE [DISTWIDTH] IN BLOOD BY AUTOMATED COUNT: 13.9 % (ref 10–15)
HCT VFR BLD AUTO: 36.1 % (ref 35–47)
HGB BLD-MCNC: 11.9 G/DL (ref 11.7–15.7)
IMM GRANULOCYTES # BLD: 0 10E9/L (ref 0–0.4)
IMM GRANULOCYTES NFR BLD: 0.7 %
LYMPHOCYTES # BLD AUTO: 1.9 10E9/L (ref 0.8–5.3)
LYMPHOCYTES NFR BLD AUTO: 45.9 %
MAGNESIUM SERPL-MCNC: 1.2 MG/DL (ref 1.6–2.3)
MCH RBC QN AUTO: 34.4 PG (ref 26.5–33)
MCHC RBC AUTO-ENTMCNC: 33 G/DL (ref 31.5–36.5)
MCV RBC AUTO: 104 FL (ref 78–100)
MONOCYTES # BLD AUTO: 0.4 10E9/L (ref 0–1.3)
MONOCYTES NFR BLD AUTO: 9 %
NEUTROPHILS # BLD AUTO: 1.7 10E9/L (ref 1.6–8.3)
NEUTROPHILS NFR BLD AUTO: 40.2 %
NRBC # BLD AUTO: 0 10*3/UL
NRBC BLD AUTO-RTO: 0 /100
PLATELET # BLD AUTO: 291 10E9/L (ref 150–450)
POTASSIUM SERPL-SCNC: 3.3 MMOL/L (ref 3.4–5.3)
RBC # BLD AUTO: 3.46 10E12/L (ref 3.8–5.2)
WBC # BLD AUTO: 4.2 10E9/L (ref 4–11)

## 2018-08-22 PROCEDURE — 85025 COMPLETE CBC W/AUTO DIFF WBC: CPT | Performed by: OBSTETRICS & GYNECOLOGY

## 2018-08-22 PROCEDURE — 84132 ASSAY OF SERUM POTASSIUM: CPT | Performed by: OBSTETRICS & GYNECOLOGY

## 2018-08-22 PROCEDURE — 25000132 ZZH RX MED GY IP 250 OP 250 PS 637: Performed by: OBSTETRICS & GYNECOLOGY

## 2018-08-22 PROCEDURE — 83735 ASSAY OF MAGNESIUM: CPT | Performed by: OBSTETRICS & GYNECOLOGY

## 2018-08-22 PROCEDURE — 25000128 H RX IP 250 OP 636: Performed by: OBSTETRICS & GYNECOLOGY

## 2018-08-22 PROCEDURE — 25000125 ZZHC RX 250: Performed by: OBSTETRICS & GYNECOLOGY

## 2018-08-22 PROCEDURE — 96413 CHEMO IV INFUSION 1 HR: CPT

## 2018-08-22 PROCEDURE — 96375 TX/PRO/DX INJ NEW DRUG ADDON: CPT

## 2018-08-22 PROCEDURE — 96367 TX/PROPH/DG ADDL SEQ IV INF: CPT

## 2018-08-22 RX ORDER — POTASSIUM CHLORIDE 1500 MG/1
40 TABLET, EXTENDED RELEASE ORAL ONCE
Status: COMPLETED | OUTPATIENT
Start: 2018-08-22 | End: 2018-08-22

## 2018-08-22 RX ADMIN — DEXAMETHASONE SODIUM PHOSPHATE 12 MG: 10 INJECTION, SOLUTION INTRAMUSCULAR; INTRAVENOUS at 09:12

## 2018-08-22 RX ADMIN — DIPHENHYDRAMINE HYDROCHLORIDE 25 MG: 50 INJECTION INTRAMUSCULAR; INTRAVENOUS at 09:40

## 2018-08-22 RX ADMIN — FAMOTIDINE 40 MG: 10 INJECTION INTRAVENOUS at 09:27

## 2018-08-22 RX ADMIN — MAGNESIUM SULFATE HEPTAHYDRATE 2 G: 500 INJECTION, SOLUTION INTRAMUSCULAR; INTRAVENOUS at 09:00

## 2018-08-22 RX ADMIN — POTASSIUM CHLORIDE 40 MEQ: 1500 TABLET, EXTENDED RELEASE ORAL at 09:43

## 2018-08-22 RX ADMIN — PACLITAXEL 138 MG: 6 INJECTION, SOLUTION INTRAVENOUS at 10:04

## 2018-08-22 RX ADMIN — SODIUM CHLORIDE 250 ML: 9 INJECTION, SOLUTION INTRAVENOUS at 09:10

## 2018-08-22 NOTE — PROGRESS NOTES
Infusion Nursing Note:  Odalys Nathan presents today for C1D22 Paclitaxel.    Patient seen by provider today: No   present during visit today: Not Applicable.    Note: Needed  Oral Potassium and IV Magnesium.    Intravenous Access:  Labs drawn without difficulty.  Implanted Port.    Treatment Conditions:  Lab Results   Component Value Date    HGB 11.9 08/22/2018     Lab Results   Component Value Date    WBC 4.2 08/22/2018      Lab Results   Component Value Date    ANEU 1.7 08/22/2018     Lab Results   Component Value Date     08/22/2018      Lab Results   Component Value Date     08/03/2018                   Lab Results   Component Value Date    POTASSIUM 3.3 08/22/2018           Lab Results   Component Value Date    MAG 1.2 08/22/2018            Lab Results   Component Value Date    CR 0.73 08/03/2018                   Results reviewed, labs MET treatment parameters, ok to proceed with treatment.  Paclitaxel  and  Electrolytes    Post Infusion Assessment:  Patient tolerated infusion without incident.  Blood return noted pre and post infusion.  Site patent and intact, free from redness, edema or discomfort.  No evidence of extravasations.  Access discontinued per protocol.    Discharge Plan:   Patient declined prescription refills.  Discharge instructions reviewed with: Patient and Family.  Patient and/or family verbalized understanding of discharge instructions and all questions answered.  AVS to patient via SMS AssistHART.  Patient will return 8/30 for next appointment.   Patient discharged in stable condition accompanied by: self and .  Departure Mode: Ambulatory.    Brenda Manzano RN

## 2018-08-22 NOTE — MR AVS SNAPSHOT
After Visit Summary   8/22/2018    Odalys Nathan    MRN: [id number]           Patient Information     Date Of Birth          1960        Visit Information        Provider Department      8/22/2018 8:00 AM RH INFUSION CHAIR 2 [facility]        Today's Diagnoses     Encounter for long-term (current) use of medications    -  1    Ovarian cancer, right (H)        Ovarian cancer, left (H)        Drug-induced neutropenia (H)           Follow-ups after your visit        Your next 10 appointments already scheduled     Aug 30, 2018  9:00 AM CDT   Level 3 with RH INFUSION CHAIR 12   [facility] ([facility])    [facility]  [address]  [place] [address]   [phone]            Sep 06, 2018  8:00 AM CDT   Level 3 with RH INFUSION CHAIR 7   [facility] ([facility])    [facility]  [address]  [place] [address]   [phone]            Sep 20, 2018  7:45 AM CDT   Lab with Infusion with RH LAB DRAW 1   [facility] ([facility])    [facility]  [address]  [place] [address]   [phone]            Sep 20, 2018  8:00 AM CDT   Return Visit with HAILE De Paz [facility] ([facility])    [facility]  [address]  [place] [address]   [phone]            Sep 20, 2018  9:00 AM CDT   Level 2 with RH INFUSION CHAIR 7   [facility] ([facility])    [facility]  [address]  [place] [address]   [phone]            Oct 04, 2018  8:00 AM CDT   Level 3 with RH INFUSION CHAIR 1   [facility]  ([facility])    [facility]  [address]  [facility] [address]   [phone]            Oct 11, 2018  8:00 AM CDT   Level 3 with RH INFUSION CHAIR 4   [facility] ([facility])    [facility]  [address]  [facility] [address]   [phone]            Oct 18, 2018  8:00 AM CDT   Level 3 with RH INFUSION CHAIR 4   [facility] ([facility])    [facility]  [address]  [facility] [address]   [phone]            Oct 25, 2018  8:00 AM CDT   Level 3 with RH INFUSION CHAIR 4   [facility] ([facility])    [facility]  [address]  [facility] [address]   [phone]              Who to contact     If you have questions or need follow up information about today's clinic visit or your schedule please contact [facility] directly at [phone].  Normal or non-critical lab and imaging results will be communicated to you by MyChart, letter or phone within 4 business days after the clinic has received the results. If you do not hear from us within 7 days, please contact the clinic through Snapshart or phone. If you have a critical or abnormal lab result, we will notify you by phone as soon as possible.  Submit refill requests through [facility] or call your pharmacy and they will forward the refill request to us. Please allow 3 business days for your refill to be completed.          Additional Information About Your Visit        SnapsharMobile Multimedia Information     [facility] gives you secure access to your electronic health record. If you see a primary care provider, you can also send messages to your care team and make appointments. If you have questions, please call your primary care clinic.  If you  do not have a primary care provider, please call [phone] and they will assist you.        Care EveryWhere ID     This is your Care EveryWhere ID. This could be used by other organizations to access your [facility] medical records  [id number]        Your Vitals Were     Pulse Temperature Respirations Pulse Oximetry BMI (Body Mass Index)       84 98.3  F (36.8  C) (Tympanic) 16 98% 23.48 kg/m2        Blood Pressure from Last 3 Encounters:   08/22/18 107/69   08/16/18 105/54   08/09/18 113/66    Weight from Last 3 Encounters:   08/22/18 64 kg (141 lb 1.5 oz)   08/16/18 64 kg (141 lb)   08/09/18 63.8 kg (140 lb 9.6 oz)              We Performed the Following     CBC with platelets differential     Magnesium     Potassium        Primary Care Provider Office Phone # Fax #    Aubrie Hester [phone]       [facility] [address] [facility] [address]        Equal Access to Services     MATEO [facility] Hospital[facility]ASH : Hadii aad ku hadasho Soomaali, waaxda luqadaha, qaybta kaalmada adeegyada, waxay roselyn haymarquise beckman . So [facility] [phone].    ATENCIÓN: Si habla español, tiene a bell disposición servicios gratuitos de asistencia lingüística. LlMer[facility] [phone].    We comply with applicable federal civil rights laws and [place] laws. We do not discriminate on the basis of race, color, national origin, age, disability, sex, sexual orientation, or gender identity.            Thank you!     Thank you for choosing [facility]  for your care. Our goal is always to provide you with excellent care. Hearing back from our patients is one way we can continue to improve our services. Please take a few minutes to complete the written survey that you may receive in the mail after your visit with us. Thank you!             Your Updated Medication List - Protect others around you: Learn how to safely use, store and throw away your medicines at  www.disposemymeds.org.          This list is accurate as of 8/22/18  4:24 PM.  Always use your most recent med list.                   Brand Name Dispense Instructions for use Diagnosis    CALCIUM + D PO      Take 1 tablet by mouth daily.    Pelvic mass       cyanocobalamin 1000 MCG/ML injection    VITAMIN B12    1 mL    Inject 1 mL (1,000 mcg) into the muscle every 30 days    B12 deficiency       diphenoxylate-atropine 2.5-0.025 MG per tablet    LOMOTIL    60 tablet    Take 2 tablets by mouth 4 times daily as needed for diarrhea    Acute diarrhea       Ferrous Sulfate 324 (65 Fe) MG Tbec     90 tablet    Take 1 tablet by mouth 3 times daily (with meals). Take with a small amount of orange juice. Do not take with calcium.    Ovarian cancer, right (H), Anemia, unspecified type, Ovarian cancer, left (H)       HERBALS      daily        * iohexol 140 MG/ML Soln solution    OMNIPAQUE    140 mL    Mix entire bottle (50ml) of contast with 600ml (20 ounces) of water and drink half 2 hrs prior to CT scan and half 1 hr prior to scan    Ovarian cancer, right (H), Metastatic cancer (H)       * iohexol 140 MG/ML Soln solution    OMNIPAQUE    50 mL    Mix entire bottle (50ml) of contast with 600ml (20 ounces) of water and drink half 2 hrs prior to CT scan and half 1 hr prior to scan    Personal history of ovarian cancer       LEVOTHYROXINE SODIUM PO      Take by mouth daily        * LORazepam 1 MG tablet    ATIVAN    30 tablet    Take 1 tablet (1 mg) by mouth every 6 hours as needed (Anxiety, Nausea/Vomiting or Sleep)    Ovarian cancer, unspecified laterality (H)       * LORazepam 1 MG tablet    ATIVAN    30 tablet    Take 1 tablet (1 mg) by mouth every 6 hours as needed (Anxiety, Nausea/Vomiting or Sleep)    Encounter for long-term (current) use of medications, Ovarian cancer, right (H), Ovarian cancer, left (H)       magnesium oxide 400 MG tablet    MAG-OX    90 tablet    Take 1 tablet (400 mg) by mouth 2 times daily    Ovarian  cancer, unspecified laterality (H)       omeprazole 20 MG CR capsule    priLOSEC    30 capsule    Take 1 capsule (20 mg) by mouth daily    Dyspepsia       order for DME     3 each    Injection Supplies for Vitamin B12: 3cc syringes w/ 27 gauge needles, 1/2 inch length    B12 deficiency       potassium chloride 20 MEQ Packet    KLOR-CON     Take 20 mEq by mouth daily        * prochlorperazine 10 MG tablet    COMPAZINE    30 tablet    Take 1 tablet (10 mg) by mouth every 6 hours as needed (nausea/vomiting)    Encounter for long-term (current) use of medications, Ovarian cancer, right (H), Ovarian cancer, left (H), Drug-induced neutropenia (H)       * prochlorperazine 10 MG tablet    COMPAZINE    30 tablet    Take 1 tablet (10 mg) by mouth every 6 hours as needed (Nausea/Vomiting)    Encounter for long-term (current) use of medications, Ovarian cancer, right (H), Ovarian cancer, left (H), Drug-induced neutropenia (H)       VITAMIN C PO      Take 500 mg by mouth daily    Pelvic mass       VITAMIN E NATURAL PO      Take 100 Units by mouth daily        * Notice:  This list has 6 medication(s) that are the same as other medications prescribed for you. Read the directions carefully, and ask your doctor or other care provider to review them with you.

## 2018-08-30 ENCOUNTER — HOSPITAL ENCOUNTER (OUTPATIENT)
Facility: CLINIC | Age: 58
Setting detail: SPECIMEN
Discharge: HOME | End: 2018-08-30
Attending: OBSTETRICS & GYNECOLOGY | Admitting: OBSTETRICS & GYNECOLOGY
Payer: COMMERCIAL

## 2018-08-30 ENCOUNTER — INFUSION THERAPY VISIT (OUTPATIENT)
Dept: INFUSION THERAPY | Facility: CLINIC | Age: 58
End: 2018-08-30
Attending: OBSTETRICS & GYNECOLOGY
Payer: COMMERCIAL

## 2018-08-30 VITALS
HEART RATE: 84 BPM | TEMPERATURE: 96.7 F | DIASTOLIC BLOOD PRESSURE: 68 MMHG | OXYGEN SATURATION: 99 % | BODY MASS INDEX: 23.68 KG/M2 | WEIGHT: 142.3 LBS | SYSTOLIC BLOOD PRESSURE: 115 MMHG | RESPIRATION RATE: 16 BRPM

## 2018-08-30 DIAGNOSIS — Z79.899 ENCOUNTER FOR LONG-TERM (CURRENT) USE OF MEDICATIONS: Primary | ICD-10-CM

## 2018-08-30 DIAGNOSIS — D70.2 DRUG-INDUCED NEUTROPENIA (H): ICD-10-CM

## 2018-08-30 DIAGNOSIS — C56.2 OVARIAN CANCER, LEFT (H): ICD-10-CM

## 2018-08-30 DIAGNOSIS — C56.1 OVARIAN CANCER, RIGHT (H): ICD-10-CM

## 2018-08-30 LAB
BASOPHILS # BLD AUTO: 0 10E9/L (ref 0–0.2)
BASOPHILS NFR BLD AUTO: 0.5 %
DIFFERENTIAL METHOD BLD: ABNORMAL
EOSINOPHIL # BLD AUTO: 0.2 10E9/L (ref 0–0.7)
EOSINOPHIL NFR BLD AUTO: 2.7 %
ERYTHROCYTE [DISTWIDTH] IN BLOOD BY AUTOMATED COUNT: 14.6 % (ref 10–15)
HCT VFR BLD AUTO: 34.9 % (ref 35–47)
HGB BLD-MCNC: 11.4 G/DL (ref 11.7–15.7)
IMM GRANULOCYTES # BLD: 0.1 10E9/L (ref 0–0.4)
IMM GRANULOCYTES NFR BLD: 1.7 %
LYMPHOCYTES # BLD AUTO: 2.3 10E9/L (ref 0.8–5.3)
LYMPHOCYTES NFR BLD AUTO: 39.4 %
MAGNESIUM SERPL-MCNC: 1.1 MG/DL (ref 1.6–2.3)
MCH RBC QN AUTO: 34.4 PG (ref 26.5–33)
MCHC RBC AUTO-ENTMCNC: 32.7 G/DL (ref 31.5–36.5)
MCV RBC AUTO: 105 FL (ref 78–100)
MONOCYTES # BLD AUTO: 0.6 10E9/L (ref 0–1.3)
MONOCYTES NFR BLD AUTO: 9.7 %
NEUTROPHILS # BLD AUTO: 2.7 10E9/L (ref 1.6–8.3)
NEUTROPHILS NFR BLD AUTO: 46 %
NRBC # BLD AUTO: 0 10*3/UL
NRBC BLD AUTO-RTO: 0 /100
PLATELET # BLD AUTO: 265 10E9/L (ref 150–450)
POTASSIUM SERPL-SCNC: 3.7 MMOL/L (ref 3.4–5.3)
RBC # BLD AUTO: 3.31 10E12/L (ref 3.8–5.2)
WBC # BLD AUTO: 5.9 10E9/L (ref 4–11)

## 2018-08-30 PROCEDURE — 25000128 H RX IP 250 OP 636: Performed by: OBSTETRICS & GYNECOLOGY

## 2018-08-30 PROCEDURE — 25000132 ZZH RX MED GY IP 250 OP 250 PS 637: Performed by: OBSTETRICS & GYNECOLOGY

## 2018-08-30 PROCEDURE — 96375 TX/PRO/DX INJ NEW DRUG ADDON: CPT

## 2018-08-30 PROCEDURE — 96367 TX/PROPH/DG ADDL SEQ IV INF: CPT

## 2018-08-30 PROCEDURE — 85025 COMPLETE CBC W/AUTO DIFF WBC: CPT | Performed by: OBSTETRICS & GYNECOLOGY

## 2018-08-30 PROCEDURE — 96413 CHEMO IV INFUSION 1 HR: CPT

## 2018-08-30 PROCEDURE — 25000125 ZZHC RX 250: Performed by: OBSTETRICS & GYNECOLOGY

## 2018-08-30 PROCEDURE — 83735 ASSAY OF MAGNESIUM: CPT | Performed by: OBSTETRICS & GYNECOLOGY

## 2018-08-30 PROCEDURE — 96366 THER/PROPH/DIAG IV INF ADDON: CPT

## 2018-08-30 PROCEDURE — 84132 ASSAY OF SERUM POTASSIUM: CPT | Performed by: OBSTETRICS & GYNECOLOGY

## 2018-08-30 RX ORDER — HEPARIN SODIUM (PORCINE) LOCK FLUSH IV SOLN 100 UNIT/ML 100 UNIT/ML
500 SOLUTION INTRAVENOUS EVERY 8 HOURS
Status: DISCONTINUED | OUTPATIENT
Start: 2018-08-30 | End: 2018-08-30 | Stop reason: HOSPADM

## 2018-08-30 RX ORDER — DIPHENHYDRAMINE HCL 25 MG
25 CAPSULE ORAL ONCE
Status: COMPLETED | OUTPATIENT
Start: 2018-08-30 | End: 2018-08-30

## 2018-08-30 RX ORDER — MAGNESIUM SULFATE HEPTAHYDRATE 40 MG/ML
4 INJECTION, SOLUTION INTRAVENOUS
Status: DISCONTINUED | OUTPATIENT
Start: 2018-08-30 | End: 2018-08-30 | Stop reason: HOSPADM

## 2018-08-30 RX ADMIN — FAMOTIDINE 40 MG: 10 INJECTION INTRAVENOUS at 09:39

## 2018-08-30 RX ADMIN — SODIUM CHLORIDE, PRESERVATIVE FREE 500 UNITS: 5 INJECTION INTRAVENOUS at 12:02

## 2018-08-30 RX ADMIN — PACLITAXEL 138 MG: 6 INJECTION, SOLUTION INTRAVENOUS at 09:58

## 2018-08-30 RX ADMIN — SODIUM CHLORIDE 250 ML: 9 INJECTION, SOLUTION INTRAVENOUS at 09:37

## 2018-08-30 RX ADMIN — MAGNESIUM SULFATE HEPTAHYDRATE 4 G: 40 INJECTION, SOLUTION INTRAVENOUS at 09:57

## 2018-08-30 RX ADMIN — DIPHENHYDRAMINE HYDROCHLORIDE 25 MG: 25 CAPSULE ORAL at 09:39

## 2018-08-30 RX ADMIN — DEXAMETHASONE SODIUM PHOSPHATE 12 MG: 10 INJECTION, SOLUTION INTRAMUSCULAR; INTRAVENOUS at 09:43

## 2018-08-30 NOTE — PROGRESS NOTES
Infusion Nursing Note:  Odalys Nathan presents today for C1D29 Taxol.    Patient seen by provider today: No   present during visit today: Not Applicable.    Note: Mag 1.1 today, replaced per protocol with 4gm IV.    Intravenous Access:  Labs drawn without difficulty.  Implanted Port.    Treatment Conditions:  Lab Results   Component Value Date    HGB 11.4 08/30/2018     Lab Results   Component Value Date    WBC 5.9 08/30/2018      Lab Results   Component Value Date    ANEU 2.7 08/30/2018     Lab Results   Component Value Date     08/30/2018      Results reviewed, labs MET treatment parameters, ok to proceed with treatment.      Post Infusion Assessment:  Patient tolerated infusion without incident.  Blood return noted pre and post infusion.  Site patent and intact, free from redness, edema or discomfort.  No evidence of extravasations.  Access discontinued per protocol.    Discharge Plan:   AVS to patient via MYCHART.  Patient will return 9/6/18 for next appointment.   Patient discharged in stable condition accompanied by: .  Departure Mode: Ambulatory.    Sonia Pham RN

## 2018-08-30 NOTE — MR AVS SNAPSHOT
After Visit Summary   8/30/2018    Odalys Nathan    MRN: [id number]           Patient Information     Date Of Birth          1960        Visit Information        Provider Department      8/30/2018 9:00 AM RH INFUSION CHAIR 12 [facility]        Today's Diagnoses     Encounter for long-term (current) use of medications    -  1    Ovarian cancer, right (H)        Ovarian cancer, left (H)        Drug-induced neutropenia (H)           Follow-ups after your visit        Your next 10 appointments already scheduled     Sep 06, 2018  8:00 AM CDT   Level 3 with RH INFUSION CHAIR 7   [facility] ([facility])    [facility]  [address]  [place] [address]   [phone]            Sep 20, 2018  7:45 AM CDT   Lab with Infusion with RH LAB DRAW 1   [facility] ([facility])    [facility]  [address]  [place] [address]   [phone]            Sep 20, 2018  8:00 AM CDT   Return Visit with HAILE De Paz [facility] ([facility])    [facility]  [address]  [place] [address]   [phone]            Sep 20, 2018  9:00 AM CDT   Level 2 with RH INFUSION CHAIR 7   [facility] ([facility])    [facility]  [address]  [place] [address]   [phone]            Oct 04, 2018  8:00 AM CDT   Level 3 with RH INFUSION CHAIR 1   [facility] ([facility])    [facility]  [address]  [place] [address]   [phone]            Oct 11, 2018  8:00 AM CDT   Level 3 with RH INFUSION CHAIR 4   [facility]  ([facility])    [facility]  [address]  [place] [address]   [phone]            Oct 18, 2018  8:00 AM CDT   Level 3 with RH INFUSION CHAIR 4   [facility] ([facility])    [facility]  [address]  [place] [address]   [phone]            Oct 25, 2018  8:00 AM CDT   Level 3 with RH INFUSION CHAIR 4   [facility] ([facility])    [facility]  [address]  [place] [address]   [phone]              Who to contact     If you have questions or need follow up information about today's clinic visit or your schedule please contact [facility] directly at [phone].  Normal or non-critical lab and imaging results will be communicated to you by MyChart, letter or phone within 4 business days after the clinic has received the results. If you do not hear from us within 7 days, please contact the clinic through trueAnthemhart or phone. If you have a critical or abnormal lab result, we will notify you by phone as soon as possible.  Submit refill requests through [employer] or call your pharmacy and they will forward the refill request to us. Please allow 3 business days for your refill to be completed.          Additional Information About Your Visit        trueAnthemhart Information     [employer] gives you secure access to your electronic health record. If you see a primary care provider, you can also send messages to your care team and make appointments. If you have questions, please call your primary care clinic.  If you do not have a primary care provider, please call [phone] and they will assist you.        Care EveryWhere ID     This is your Care EveryWhere ID. This could be used by other organizations to access your [facility] medical records  [id number]         Your Vitals Were     Pulse Temperature Respirations Pulse Oximetry BMI (Body Mass Index)       84 96.7  F (35.9  C) (Tympanic) 16 99% 23.68 kg/m2        Blood Pressure from Last 3 Encounters:   08/30/18 115/68   08/22/18 107/69   08/16/18 105/54    Weight from Last 3 Encounters:   08/30/18 64.5 kg (142 lb 4.8 oz)   08/22/18 64 kg (141 lb 1.5 oz)   08/16/18 64 kg (141 lb)              We Performed the Following     CBC with platelets differential     Magnesium     Potassium        Primary Care Provider Office Phone # Fax #    Aubrie Hester [phone]       [facility] [address] [facility] [address]        Equal Access to Services     SANDY CHAMBERS : Hadii bj landao Socait, waaxda luqadaha, qaybta kaalmada adeegyajulieta, blanka beckman . So [facility] [phone].    ATENCIÓN: Si habla español, tiene a bell disposición servicios gratuitos de asistencia lingüística. Llame al [phone].    We comply with applicable federal civil rights laws and [place] laws. We do not discriminate on the basis of race, color, national origin, age, disability, sex, sexual orientation, or gender identity.            Thank you!     Thank you for choosing [facility] IN[facility]ION [facility]  for your care. Our goal is always to provide you with excellent care. Hearing back from our patients is one way we can continue to improve our services. Please take a few minutes to complete the written survey that you may receive in the mail after your visit with us. Thank you!             Your Updated Medication List - Protect others around you: Learn how to safely use, store and throw away your medicines at www.disposemymeds.org.          This list is accurate as of 8/30/18 12:15 PM.  Always use your most recent med list.                   Brand Name Dispense Instructions for use Diagnosis    CALCIUM + D PO      Take 1 tablet by mouth daily.    Pelvic mass        cyanocobalamin 1000 MCG/ML injection    VITAMIN B12    1 mL    Inject 1 mL (1,000 mcg) into the muscle every 30 days    B12 deficiency       diphenoxylate-atropine 2.5-0.025 MG per tablet    LOMOTIL    60 tablet    Take 2 tablets by mouth 4 times daily as needed for diarrhea    Acute diarrhea       Ferrous Sulfate 324 (65 Fe) MG Tbec     90 tablet    Take 1 tablet by mouth 3 times daily (with meals). Take with a small amount of orange juice. Do not take with calcium.    Ovarian cancer, right (H), Anemia, unspecified type, Ovarian cancer, left (H)       HERBALS      daily        * iohexol 140 MG/ML Soln solution    OMNIPAQUE    140 mL    Mix entire bottle (50ml) of contast with 600ml (20 ounces) of water and drink half 2 hrs prior to CT scan and half 1 hr prior to scan    Ovarian cancer, right (H), Metastatic cancer (H)       * iohexol 140 MG/ML Soln solution    OMNIPAQUE    50 mL    Mix entire bottle (50ml) of contast with 600ml (20 ounces) of water and drink half 2 hrs prior to CT scan and half 1 hr prior to scan    Personal history of ovarian cancer       LEVOTHYROXINE SODIUM PO      Take by mouth daily        * LORazepam 1 MG tablet    ATIVAN    30 tablet    Take 1 tablet (1 mg) by mouth every 6 hours as needed (Anxiety, Nausea/Vomiting or Sleep)    Ovarian cancer, unspecified laterality (H)       * LORazepam 1 MG tablet    ATIVAN    30 tablet    Take 1 tablet (1 mg) by mouth every 6 hours as needed (Anxiety, Nausea/Vomiting or Sleep)    Encounter for long-term (current) use of medications, Ovarian cancer, right (H), Ovarian cancer, left (H)       magnesium oxide 400 MG tablet    MAG-OX    90 tablet    Take 1 tablet (400 mg) by mouth 2 times daily    Ovarian cancer, unspecified laterality (H)       omeprazole 20 MG CR capsule    priLOSEC    30 capsule    Take 1 capsule (20 mg) by mouth daily    Dyspepsia       order for DME     3 each    Injection Supplies for Vitamin B12: 3cc syringes w/ 27 gauge needles, 1/2  inch length    B12 deficiency       potassium chloride 20 MEQ Packet    KLOR-CON     Take 20 mEq by mouth daily        * prochlorperazine 10 MG tablet    COMPAZINE    30 tablet    Take 1 tablet (10 mg) by mouth every 6 hours as needed (nausea/vomiting)    Encounter for long-term (current) use of medications, Ovarian cancer, right (H), Ovarian cancer, left (H), Drug-induced neutropenia (H)       * prochlorperazine 10 MG tablet    COMPAZINE    30 tablet    Take 1 tablet (10 mg) by mouth every 6 hours as needed (Nausea/Vomiting)    Encounter for long-term (current) use of medications, Ovarian cancer, right (H), Ovarian cancer, left (H), Drug-induced neutropenia (H)       VITAMIN C PO      Take 500 mg by mouth daily    Pelvic mass       VITAMIN E NATURAL PO      Take 100 Units by mouth daily        * Notice:  This list has 6 medication(s) that are the same as other medications prescribed for you. Read the directions carefully, and ask your doctor or other care provider to review them with you.

## 2018-09-06 ENCOUNTER — HOSPITAL ENCOUNTER (OUTPATIENT)
Facility: CLINIC | Age: 58
Setting detail: SPECIMEN
Discharge: HOME | End: 2018-09-06
Attending: OBSTETRICS & GYNECOLOGY | Admitting: OBSTETRICS & GYNECOLOGY
Payer: COMMERCIAL

## 2018-09-06 ENCOUNTER — INFUSION THERAPY VISIT (OUTPATIENT)
Dept: INFUSION THERAPY | Facility: CLINIC | Age: 58
End: 2018-09-06
Attending: OBSTETRICS & GYNECOLOGY
Payer: COMMERCIAL

## 2018-09-06 VITALS
OXYGEN SATURATION: 99 % | TEMPERATURE: 97.7 F | BODY MASS INDEX: 23.81 KG/M2 | SYSTOLIC BLOOD PRESSURE: 108 MMHG | HEART RATE: 81 BPM | RESPIRATION RATE: 16 BRPM | WEIGHT: 143.1 LBS | DIASTOLIC BLOOD PRESSURE: 78 MMHG

## 2018-09-06 DIAGNOSIS — Z79.899 ENCOUNTER FOR LONG-TERM (CURRENT) USE OF MEDICATIONS: Primary | ICD-10-CM

## 2018-09-06 DIAGNOSIS — R19.7 ACUTE DIARRHEA: ICD-10-CM

## 2018-09-06 DIAGNOSIS — C56.2 OVARIAN CANCER, LEFT (H): ICD-10-CM

## 2018-09-06 DIAGNOSIS — C56.1 OVARIAN CANCER, RIGHT (H): ICD-10-CM

## 2018-09-06 DIAGNOSIS — D70.2 DRUG-INDUCED NEUTROPENIA (H): ICD-10-CM

## 2018-09-06 LAB
BASOPHILS # BLD AUTO: 0.1 10E9/L (ref 0–0.2)
BASOPHILS NFR BLD AUTO: 0.9 %
DIFFERENTIAL METHOD BLD: ABNORMAL
EOSINOPHIL # BLD AUTO: 0.2 10E9/L (ref 0–0.7)
EOSINOPHIL NFR BLD AUTO: 3 %
ERYTHROCYTE [DISTWIDTH] IN BLOOD BY AUTOMATED COUNT: 15 % (ref 10–15)
HCT VFR BLD AUTO: 34.9 % (ref 35–47)
HGB BLD-MCNC: 11.7 G/DL (ref 11.7–15.7)
IMM GRANULOCYTES # BLD: 0.1 10E9/L (ref 0–0.4)
IMM GRANULOCYTES NFR BLD: 1.4 %
LYMPHOCYTES # BLD AUTO: 2.1 10E9/L (ref 0.8–5.3)
LYMPHOCYTES NFR BLD AUTO: 37.1 %
MAGNESIUM SERPL-MCNC: 1.1 MG/DL (ref 1.6–2.3)
MCH RBC QN AUTO: 35.2 PG (ref 26.5–33)
MCHC RBC AUTO-ENTMCNC: 33.5 G/DL (ref 31.5–36.5)
MCV RBC AUTO: 105 FL (ref 78–100)
MONOCYTES # BLD AUTO: 0.6 10E9/L (ref 0–1.3)
MONOCYTES NFR BLD AUTO: 10.7 %
NEUTROPHILS # BLD AUTO: 2.7 10E9/L (ref 1.6–8.3)
NEUTROPHILS NFR BLD AUTO: 46.9 %
NRBC # BLD AUTO: 0 10*3/UL
NRBC BLD AUTO-RTO: 0 /100
PLATELET # BLD AUTO: 269 10E9/L (ref 150–450)
POTASSIUM SERPL-SCNC: 3.5 MMOL/L (ref 3.4–5.3)
RBC # BLD AUTO: 3.32 10E12/L (ref 3.8–5.2)
WBC # BLD AUTO: 5.7 10E9/L (ref 4–11)

## 2018-09-06 PROCEDURE — 85025 COMPLETE CBC W/AUTO DIFF WBC: CPT | Performed by: OBSTETRICS & GYNECOLOGY

## 2018-09-06 PROCEDURE — 96368 THER/DIAG CONCURRENT INF: CPT

## 2018-09-06 PROCEDURE — 96375 TX/PRO/DX INJ NEW DRUG ADDON: CPT

## 2018-09-06 PROCEDURE — 83735 ASSAY OF MAGNESIUM: CPT | Performed by: OBSTETRICS & GYNECOLOGY

## 2018-09-06 PROCEDURE — 25000132 ZZH RX MED GY IP 250 OP 250 PS 637: Performed by: OBSTETRICS & GYNECOLOGY

## 2018-09-06 PROCEDURE — 25000125 ZZHC RX 250: Performed by: OBSTETRICS & GYNECOLOGY

## 2018-09-06 PROCEDURE — 84132 ASSAY OF SERUM POTASSIUM: CPT | Performed by: OBSTETRICS & GYNECOLOGY

## 2018-09-06 PROCEDURE — 25000128 H RX IP 250 OP 636: Performed by: OBSTETRICS & GYNECOLOGY

## 2018-09-06 PROCEDURE — 96413 CHEMO IV INFUSION 1 HR: CPT

## 2018-09-06 RX ORDER — DIPHENHYDRAMINE HCL 25 MG
25 CAPSULE ORAL ONCE
Status: COMPLETED | OUTPATIENT
Start: 2018-09-06 | End: 2018-09-06

## 2018-09-06 RX ORDER — DIPHENOXYLATE HCL/ATROPINE 2.5-.025MG
2 TABLET ORAL 4 TIMES DAILY PRN
Qty: 60 TABLET | Refills: 0 | Status: SHIPPED | OUTPATIENT
Start: 2018-09-06

## 2018-09-06 RX ORDER — HEPARIN SODIUM (PORCINE) LOCK FLUSH IV SOLN 100 UNIT/ML 100 UNIT/ML
500 SOLUTION INTRAVENOUS EVERY 8 HOURS
Status: DISCONTINUED | OUTPATIENT
Start: 2018-09-06 | End: 2018-09-06 | Stop reason: HOSPADM

## 2018-09-06 RX ADMIN — SODIUM CHLORIDE 250 ML: 9 INJECTION, SOLUTION INTRAVENOUS at 09:08

## 2018-09-06 RX ADMIN — FAMOTIDINE 40 MG: 10 INJECTION INTRAVENOUS at 08:37

## 2018-09-06 RX ADMIN — DEXAMETHASONE SODIUM PHOSPHATE 12 MG: 10 INJECTION, SOLUTION INTRAMUSCULAR; INTRAVENOUS at 08:36

## 2018-09-06 RX ADMIN — SODIUM CHLORIDE, PRESERVATIVE FREE 500 UNITS: 5 INJECTION INTRAVENOUS at 11:14

## 2018-09-06 RX ADMIN — PACLITAXEL 138 MG: 6 INJECTION, SOLUTION INTRAVENOUS at 09:05

## 2018-09-06 RX ADMIN — DIPHENHYDRAMINE HYDROCHLORIDE 25 MG: 25 CAPSULE ORAL at 08:37

## 2018-09-06 RX ADMIN — MAGNESIUM SULFATE HEPTAHYDRATE 4 G: 500 INJECTION, SOLUTION INTRAMUSCULAR; INTRAVENOUS at 09:04

## 2018-09-06 NOTE — PROGRESS NOTES
Infusion Nursing Note:  Odalys Nathan presents today for taxol.    Patient seen by provider today: No   present during visit today: Not Applicable.    Note: Magnesium replaced (1.1- replaced with 4 gm over 2 hours). Has loose stools at baseline lately. Taking magnesium oxide OTC- suggested trying magnesium glycinate as it can have less of a laxative effect. Patient is leaving for [place] on Monday.     Intravenous Access:  Labs drawn without difficulty.  Implanted Port.    Treatment Conditions:  Lab Results   Component Value Date    HGB 11.7 09/06/2018     Lab Results   Component Value Date    WBC 5.7 09/06/2018      Lab Results   Component Value Date    ANEU 2.7 09/06/2018     Lab Results   Component Value Date     09/06/2018      Results reviewed, labs MET treatment parameters, ok to proceed with treatment.    Magnesium level 1.1.    Post Infusion Assessment:  Patient tolerated infusion without incident.  Blood return noted pre and post infusion.  Site patent and intact, free from redness, edema or discomfort.  Access discontinued per protocol.    Discharge Plan:   Prescription refills given for lomotil.  Discharge instructions reviewed with: Patient and Family.  Patient and/or family verbalized understanding of discharge instructions and all questions answered.  Patient discharged in stable condition accompanied by: .  Departure Mode: Ambulatory.    Naz Razo RN

## 2018-09-06 NOTE — MR AVS SNAPSHOT
After Visit Summary   9/6/2018    Odalys Nathan    MRN: [id number]           Patient Information     Date Of Birth          1960        Visit Information        Provider Department      9/6/2018 8:00 AM RH INFUSION CHAIR 7 [facility]        Today's Diagnoses     Encounter for long-term (current) use of medications    -  1    Ovarian cancer, right (H)        Ovarian cancer, left (H)        Drug-induced neutropenia (H)        Acute diarrhea           Follow-ups after your visit        Your next 10 appointments already scheduled     Sep 20, 2018  7:45 AM CDT   Lab with Infusion with RH LAB DRAW 1   [facility] ([facility])    [facility]  [address]  [place] [address]   [phone]            Sep 20, 2018  8:00 AM CDT   Return Visit with HAILE De Paz [facility] ([facility])    [facility]  [address]  [place] [address]   [phone]            Sep 20, 2018  9:00 AM CDT   Level 2 with RH INFUSION CHAIR 7   [facility] ([facility])    [facility]  [address]  [place] [address]   [phone]            Oct 04, 2018  8:00 AM CDT   Level 3 with RH INFUSION CHAIR 1   [facility] ([facility])    [facility]  [address]  [place] [address]   [phone]            Oct 11, 2018  8:00 AM CDT   Level 3 with RH INFUSION CHAIR 4   [facility] ([facility])    [facility]  [address]  [place] [address]   [phone]            Oct 18, 2018  8:00 AM CDT   Level 3 with RH INFUSION CHAIR 4   [facility]  Infusion Services ([facility])    [facility]  [address]  [facility] [address] [phone]            Oct 25, 2018  8:00 AM CDT   Level 3 with  INFUSION CHAIR 4   [facility] ([facility])    [facility]  [address]  [place] [address] [phone]              Who to contact     If you have questions or need follow up information about today's clinic visit or your schedule please contact [facility] directly at [phone].  Normal or non-critical lab and imaging results will be communicated to you by MyChart, letter or phone within 4 business days after the clinic has received the results. If you do not hear from us within 7 days, please contact the clinic through zLensehart or phone. If you have a critical or abnormal lab result, we will notify you by phone as soon as possible.  Submit refill requests through Eventful or call your pharmacy and they will forward the refill request to us. Please allow 3 business days for your refill to be completed.          Additional Information About Your Visit        zLensehart Information     Eventful gives you secure access to your electronic health record. If you see a primary care provider, you can also send messages to your care team and make appointments. If you have questions, please call your primary care clinic.  If you do not have a primary care provider, please call [phone] and they will assist you.        Care EveryWhere ID     This is your Care EveryWhere ID. This could be used by other organizations to access your [place] medical records  [id number]        Your Vitals Were     Pulse Temperature Respirations Pulse Oximetry BMI (Body Mass Index)       81 97.7  F (36.5  C) (Tympanic) 16 99% 23.81 kg/m2        Blood Pressure from Last 3 Encounters:   09/06/18 108/78   08/30/18 115/68    08/22/18 107/69    Weight from Last 3 Encounters:   09/06/18 64.9 kg (143 lb 1.6 oz)   08/30/18 64.5 kg (142 lb 4.8 oz)   08/22/18 64 kg (141 lb 1.5 oz)              We Performed the Following     CBC with platelets differential     Magnesium     Potassium          Where to get your medicines      Some of these will need a paper prescription and others can be bought over the counter.  Ask your nurse if you have questions.     Bring a paper prescription for each of these medications     diphenoxylate-atropine 2.5-0.025 MG per tablet          Primary Care Provider Office Phone # Fax #    Aubrie Hester [phone]       [facility] [address] [facility] [address]        Equal Access to Services     SANDY CHAMBERS : Felicia Walter, wajeovanny smartqadaha, qaybmony kaalmajulieta staples, blanka szymanski. So [facility] [place] [phone].    ATENCIÓN: Si habla español, tiene a bell disposición servicios gratuitos de asistencia lingüística. Llame al [phone].    We comply with applicable federal civil rights laws and [place] laws. We do not discriminate on the basis of race, color, national origin, age, disability, sex, sexual orientation, or gender identity.            Thank you!     Thank you for choosing [facility]  for your care. Our goal is always to provide you with excellent care. Hearing back from our patients is one way we can continue to improve our services. Please take a few minutes to complete the written survey that you may receive in the mail after your visit with us. Thank you!             Your Updated Medication List - Protect others around you: Learn how to safely use, store and throw away your medicines at www.disposemymeds.org.          This list is accurate as of 9/6/18 11:18 AM.  Always use your most recent med list.                   Brand Name Dispense Instructions for use Diagnosis    CALCIUM + D PO       Take 1 tablet by mouth daily.    Pelvic mass       cyanocobalamin 1000 MCG/ML injection    VITAMIN B12    1 mL    Inject 1 mL (1,000 mcg) into the muscle every 30 days    B12 deficiency       diphenoxylate-atropine 2.5-0.025 MG per tablet    LOMOTIL    60 tablet    Take 2 tablets by mouth 4 times daily as needed for diarrhea    Acute diarrhea       Ferrous Sulfate 324 (65 Fe) MG Tbec     90 tablet    Take 1 tablet by mouth 3 times daily (with meals). Take with a small amount of orange juice. Do not take with calcium.    Ovarian cancer, right (H), Anemia, unspecified type, Ovarian cancer, left (H)       HERBALS      daily        * iohexol 140 MG/ML Soln solution    OMNIPAQUE    140 mL    Mix entire bottle (50ml) of contast with 600ml (20 ounces) of water and drink half 2 hrs prior to CT scan and half 1 hr prior to scan    Ovarian cancer, right (H), Metastatic cancer (H)       * iohexol 140 MG/ML Soln solution    OMNIPAQUE    50 mL    Mix entire bottle (50ml) of contast with 600ml (20 ounces) of water and drink half 2 hrs prior to CT scan and half 1 hr prior to scan    Personal history of ovarian cancer       LEVOTHYROXINE SODIUM PO      Take by mouth daily        * LORazepam 1 MG tablet    ATIVAN    30 tablet    Take 1 tablet (1 mg) by mouth every 6 hours as needed (Anxiety, Nausea/Vomiting or Sleep)    Ovarian cancer, unspecified laterality (H)       * LORazepam 1 MG tablet    ATIVAN    30 tablet    Take 1 tablet (1 mg) by mouth every 6 hours as needed (Anxiety, Nausea/Vomiting or Sleep)    Encounter for long-term (current) use of medications, Ovarian cancer, right (H), Ovarian cancer, left (H)       magnesium oxide 400 MG tablet    MAG-OX    90 tablet    Take 1 tablet (400 mg) by mouth 2 times daily    Ovarian cancer, unspecified laterality (H)       omeprazole 20 MG CR capsule    priLOSEC    30 capsule    Take 1 capsule (20 mg) by mouth daily    Dyspepsia       order for DME     3 each    Injection Supplies for  Vitamin B12: 3cc syringes w/ 27 gauge needles, 1/2 inch length    B12 deficiency       potassium chloride 20 MEQ Packet    KLOR-CON     Take 20 mEq by mouth daily        * prochlorperazine 10 MG tablet    COMPAZINE    30 tablet    Take 1 tablet (10 mg) by mouth every 6 hours as needed (nausea/vomiting)    Encounter for long-term (current) use of medications, Ovarian cancer, right (H), Ovarian cancer, left (H), Drug-induced neutropenia (H)       * prochlorperazine 10 MG tablet    COMPAZINE    30 tablet    Take 1 tablet (10 mg) by mouth every 6 hours as needed (Nausea/Vomiting)    Encounter for long-term (current) use of medications, Ovarian cancer, right (H), Ovarian cancer, left (H), Drug-induced neutropenia (H)       VITAMIN C PO      Take 500 mg by mouth daily    Pelvic mass       VITAMIN E NATURAL PO      Take 100 Units by mouth daily        * Notice:  This list has 6 medication(s) that are the same as other medications prescribed for you. Read the directions carefully, and ask your doctor or other care provider to review them with you.

## 2018-09-12 DIAGNOSIS — C56.1 OVARIAN CANCER, RIGHT (H): ICD-10-CM

## 2018-09-12 DIAGNOSIS — C56.2 OVARIAN CANCER, LEFT (H): ICD-10-CM

## 2018-09-12 DIAGNOSIS — D64.9 ANEMIA, UNSPECIFIED TYPE: ICD-10-CM

## 2018-09-12 RX ORDER — FERROUS SULFATE 324(65)MG
TABLET, DELAYED RELEASE (ENTERIC COATED) ORAL
Qty: 90 TABLET | Refills: 0 | Status: SHIPPED | OUTPATIENT
Start: 2018-09-12 | End: 2018-10-25

## 2018-09-12 NOTE — TELEPHONE ENCOUNTER
patient calling for refill of ferrous sulfate for the patient  Last visit with MD 8/3/18  Last order date   Ferrous Sulfate 324 (65 Fe) MG TBEC 90 tablet 0 8/15/2018  No   Sig: Take 1 tablet by mouth 3 times daily (with meals). Take with a small amount of orange juice. Do not take with calcium.      Please advise on refills for patient

## 2018-09-19 ASSESSMENT — ENCOUNTER SYMPTOMS
POLYDIPSIA: 0
VOMITING: 0
MUSCLE WEAKNESS: 0
BLOATING: 0
HEMATURIA: 0
HEMOPTYSIS: 0
EYE REDNESS: 0
DOUBLE VISION: 0
HYPERTENSION: 0
DIFFICULTY URINATING: 0
NUMBNESS: 0
BREAST PAIN: 0
PARALYSIS: 0
DIZZINESS: 0
BOWEL INCONTINENCE: 0
LOSS OF CONSCIOUSNESS: 0
SWOLLEN GLANDS: 0
LIGHT-HEADEDNESS: 0
ABDOMINAL PAIN: 0
SMELL DISTURBANCE: 0
DEPRESSION: 0
HALLUCINATIONS: 0
DIARRHEA: 1
SINUS PAIN: 0
POSTURAL DYSPNEA: 0
BREAST MASS: 0
JAUNDICE: 0
WHEEZING: 0
DYSPNEA ON EXERTION: 0
PALPITATIONS: 0
SINUS CONGESTION: 0
MYALGIAS: 0
RESPIRATORY PAIN: 0
WEIGHT LOSS: 0
SHORTNESS OF BREATH: 0
DECREASED CONCENTRATION: 0
HOARSE VOICE: 0
COUGH DISTURBING SLEEP: 0
POOR WOUND HEALING: 0
NECK PAIN: 0
HYPOTENSION: 0
SORE THROAT: 0
WEAKNESS: 0
INSOMNIA: 0
ARTHRALGIAS: 0
MEMORY LOSS: 0
LEG PAIN: 0
SKIN CHANGES: 0
BLOOD IN STOOL: 0
LEG SWELLING: 0
WEIGHT GAIN: 0
STIFFNESS: 0
HEARTBURN: 0
TROUBLE SWALLOWING: 0
FLANK PAIN: 0
POLYPHAGIA: 0
DECREASED APPETITE: 0
SYNCOPE: 0
NECK MASS: 0
TACHYCARDIA: 0
ALTERED TEMPERATURE REGULATION: 0
DECREASED LIBIDO: 0
COUGH: 0
SNORES LOUDLY: 0
SLEEP DISTURBANCES DUE TO BREATHING: 0
NAIL CHANGES: 0
RECTAL PAIN: 0
EXTREMITY NUMBNESS: 0
RECTAL BLEEDING: 0
NAUSEA: 0
EXERCISE INTOLERANCE: 0
EYE IRRITATION: 0
INCREASED ENERGY: 0
CONSTIPATION: 0
EYE WATERING: 0
HEADACHES: 0
EYE PAIN: 0
TASTE DISTURBANCE: 0
PANIC: 0
FEVER: 0
NIGHT SWEATS: 0
MUSCLE CRAMPS: 0
ORTHOPNEA: 0
SEIZURES: 0
JOINT SWELLING: 0
HOT FLASHES: 0
CLAUDICATION: 0
DYSURIA: 0
BACK PAIN: 0
CHILLS: 0
NERVOUS/ANXIOUS: 0
TREMORS: 0
SPUTUM PRODUCTION: 0
BRUISES/BLEEDS EASILY: 0
DISTURBANCES IN COORDINATION: 0
SPEECH CHANGE: 0

## 2018-09-19 NOTE — PROGRESS NOTES
Gynecologic Oncology Follow-Up Note    RE: Odalys Nathan  MRN: [id number]  : 1960  Date of Visit: 2018    CC: Odalys Nathan is a 58 year old year old female with recurrent stage IIIC bilateral ovarian cancer who presents today for disease management with weekly paclitaxel.    HPI: Odalys comes to the clinic feeling fair. She notes fatigue on day three each week and a few days of diarrhea each week relieved with loperamide. Takes magnesium daily which worsens her diarrhea- has switched from mag oxide to mag glycinate, feels this is more tolerable for her. Notes rare tingling in her fingertips, roughly twice per month and denies need for intervention. She continues on ferrous sulfate 325mg TID for history of anemia. She also continues tinnitus- denies need for intervention. Denies fevers, chills, RUQ pain, jaundice, or vomiting. She is eating well, currently drinking well, reports she is ready for chemotherapy today. Had a great time in [place]. Would like chemo adjusted at the end of her cycle so that she can go to a conference in [place] on her off week.        Brief Oncology History:  2012 - Admitted to hospital for 2 weeks of intermittent abdominal cramping, distention, diarrhea and N/V. CT of abdomen/pelvis significant for small bowel obstruction, a heterogenous soft tissue density in the pelvis, omental nodules, and ascites. Bilateral adnexal masses per U/S of pelvis. CA-125 was elevated at 987, CEA was normal at 1.0.    12 - Therapeutic paracentesis (4 L) with cytology confirming malignancy (IA positive, weak ER positive, CK-7 positive consistent with GYN primary). Surgery recommended d/t potential for falling blood counts 2/2 chemotherapy (patient is J[employer]ism and limiting blood transfusion)    12 - Exploratory laparotomy, MAR BSO, lysis of adhesion, Appendectomy, Repair cystotomy, Omentectomy Purt-dfzuvj-otfzymikv-transverse-colon resection, Ileo-descending colon  anastomosis, CUSA, & Colonoscopy (done by Dr. Amato and colorectal team).  2/20/2012 - Admitted to hospital for bilateral pulmonary emboli and drainage of pleural effusion. Started on Lovenox.  2/28/12-3/21/12: Cycle #1-2 Carbo/Taxol IV  3/29/12 - Started on Keflex by her PCP for infection in her healing wound (immediately below her umbilicus).    4/11/12-6/14/12: Cycle #3-6 IV chemo, Cycle #1 IV/IP chemo  7/16/12 -  8, CT PRADEEP - enrolled in  - observation arm  3/4/13-6/28/13:  6, 9, 12, 15, 20.  7/1/13: CT Chest, Abdomen, Pelvis IMPRESSION:  1. Worsening metastatic ovarian carcinoma suggested by increased size of soft tissue nodules anterior to the right psoas muscle, that may represent growing mesenteric lymphadenopathy.  2. Remaining prominent right lower quadrant mesenteric lymph nodes are not significantly changed from CT 7/16/2012.  3. Clustered nodular opacities in the right lower lobe are not significant change from 7/16/2012 and remain indeterminate. Again, the appearance and distribution is suggestive of an infectious etiology.  4. Stable 8 mm soft tissue nodule in left breast, unchanged since at least 02/20/2012. This can be observed on followup studies, but correlation with mammography could be considered.  Decision made to start Doxil/Carbo.  7/11/13: The left ventricular ejection fraction is normal at 66.4%.  7/12/13-9/6/13: Cycle #1-3 Doxil/Carbo.  10, 11.    9/30/13 CT C/A/P Impression:  1. Overall, favorable response to treatment with decreasing size of soft tissue nodules tracking along the anterior aspect of the right psoas muscle.  2. 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  3. Improved cluster of right lower lobe pulmonary nodular opacities. These may represent resolving infection.  10/3/13-12/9/13:  9, 8, 10. Cycle 4-6 Doxil/Carbo.    1/13/14 CT C/A/P Impression:   1. Stable appearance of metastatic ovarian cancer. Scattered soft tissue nodules along the  anterior aspect of the right psoas muscle are unchanged in size. Mild mesenteric lymphadenopathy is unchanged.  2. Clustered micronodules in the right lower lobe are unchanged from 9/30/2013, but improved from 7/1/2013. This history suggests a postinflammatory/postinfectious etiology.  3. 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  1/16/14 Discussed multiple options for her based on relatively stable-appearing disease on CT but slight increase in  (which has had small increase to 20 with last recurrence) including chemo break with recheck of  in 1 month, starting new chemo agent immediately, and exploratory surgery with possible resection of nodules. She is considered platinum-sensitive based on > 1 year remission after Taxol/Carbo, which we will take into consideration for future chemo planning. I am not inclined to surgery at this time given difficulty she already has with diarrhea secondary to past colon resection. I suspect we would need to resect further bowel due to mesenteric disease. Also explained inherent risks of any major surgery. Also mentioned maintenance chemo, but this has not been shown to increase overall survival and would likely decrease her quality of life without significant benefit. Family is going on vacation to [place] in 2 weeks and Odalys does not want to have chemo prior to that, so will plan to take 1 month break. She can have  at that time (discussed checking toady since last draw was in early December, but as it would likely not change treatment plan and she has h/o slow rising , will not check today).  2/17/14  16    2/20/14: Decision to take break from chemo for two months, followed by CT and CA-125.    4/21/14  27  4/21/14 CT C/A/P Impression:    1. Increased size of 2 low-attenuation lymph nodes anterior to the right psoas muscle is concerning for worsening metastatic ovarian cancer.    2. New circumferential thickening of a 3.8 cm length  "segment of distal transverse colon is likely physiologic. Recommend attention on followup imaging.    3. Grossly unchanged size of clustered small nodules versus scarring in the right lower lobe the lungs.    4. Stable thrombosis of the right ovarian vein.  5/14/14: Diagnostic laparoscopy converted to exploratory laparotomy and removal of mesenteric masses, tumor debulking, peritoneal biopsies and intraperitoneal port placement. On laparoscopy, it was noted that there were small nodules on the anterior abdominal wall near the previous incision, small nodules on the right pelvic sidewall as well. Nodules were palpated in the mesentery; however, as it was unable to clarify where the origin of the nodules was, the decision was made to open the patient. On opening there was found to be approximately a 3 cm nodule in the small bowel mesentery and another separate approximately 2 cm nodule in the bowel mesentery. Pelvis without evidence of cancer, some mesenteric lymph nodes were palpated. No evidence otherwise of any disseminated cancer throughout the abdomen.    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 (2/2)  -Largest metastasis: 1.3 cm  -See comment  D: Peritoneum, right paracolic gutter #2, biopsy:  -Fibroadipose tissue with granulomatous inflammation surrounding refractile material  -Negative for malignancy   E: Small bowel adhesion, biopsy:  -Fibroconnective tissue, consistent with adhesion  -Negative for malignancy  F: Lymph nodes, mesentry, not otherwise specified, excision:  -Two lymph nodes, negative for metastatic carcinoma (0/2)  G: Lymph node, mesentery, \"#2\", " "excision:  -One lymph node, negative for metastatic carcinoma (0/1)  H: Lymph nodes, mesentery, \"nodule #2\", excision:  -Five lymph nodes, negative for metastatic carcinoma (0/5)  COMMENT:  Some of the specimens show post-operative changes. Others show possible treatment related changes, including necrosis. The metastatic carcinoma in the mesenteric lymph nodes (specimen C) shows variable morphology, including relatively low grade tumor with papillary architecture, and high grade tumor comprised of nests of tumor cells with irregular, slit-like spaces and marked nuclear pleomorphism.    5/29/14: Cycle 1 IV PACLitaxel / IP CISplatin / IP PACLitaxel.  - 28.  6/26/14: Cycle #2 IV/IP.  10  8/5/14: CT chest/abd/pelvis IMPRESSION     1. In this patient with ovarian cancer, overall findings are indicative of stable/slight improvement, as multiple mesenteric lymphadenopathy and scattered nodular peritoneal soft tissue mass lesions appear unchanged or slightly smaller since 4/21/2014.    2. 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 Mild dilatation of the second and the third portion of the duodenum with a narrow SMA angle. This could represent SMA syndrome, if clinically correlated.17/14:    Cycle #3 IV/IP.  10.    CT chest/abd/pelvis with contrast on 8/5/14    Impression:    1. In this patient with ovarian cancer, overall findings are indicative of stable/slight improvement, as multiple mesenteric lymphadenopathy and scattered nodular peritoneal soft tissue mass lesions appear unchanged or slightly smaller since 4/21/2014.    2. 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 Mild dilatation of the second and the third portion of the duodenum with a narrow SMA angle. This could represent SMA syndrome, if clinically correlated.  8/7/14: Cycle #4 Taxol/Carbo (changed from IV/IP).  10. She has been feeling okay. She is unsure if she can finish out the course of 6 cycles " IV/IP taxol/cisplatin. She feels like she has the flu for about a week then starts feeling gradually better after each chemo cycle. Her spouse notes that she actually has been more sick with the treatments than she initially admits here. She was also previously having some rib pain. Denies any rib pain now. Denies any chest pain or shortness of breath.  Plan: discussed recent CT cap results and switching to just IV as she is feeling miserable with IP treatments. Switch to IV carbo/taxol as patient is platinum sensitive.  We also discussed her taking part of the tesaro trial, which would require BRCA testing. She would like to take part in this trial if eligible.  8/20/14: Remove Intraperitoneal Port ( Port and catheter intact - discarded)  8/28/14: Cycle #5 Taxol/Carbo held due to thrombocytopenia.  6.    She denies any vaginal bleeding, no changes in her bowel or bladder habits, no nausea/emesis, no lower extremity edema, and no difficulties eating or sleeping. She denies any abdominal discomfort/bloating, no fevers or chills, and no chest pain or shortness of breath. She states her diarrhea is the same. She reports some fatigue which improves about 1-2 weeks after her chemotherapy. She states she does not need any medication refills and she was told she does not meet the criteria for the TESARO trial. She states she has 3 bags of iv fluids left over from her previous chemotherapy and will give these to herself. She states she is ready for her treatment today.    9/29/14: Cycle #6 Taxol/Carbo  6. Insurance questions regarding GSF coverage today. No concerns other than fatigue. Taking iron for anemia and does not desire blood transfusion. Using neulasta for neutropenia. Using home IV hydration if needed. Baseline unchanged. No abdominal bloating, constipation, diarrhea, pain, vaginal or rectal bleeding, cough or dyspnea, fluid retention.    10/16/14: Impression:    1. Nodular peritoneal soft tissue mass  in the right lower quadrant adjacent to the psoas muscle is no longer appreciated. Adjacent prominent lymphadenopathy is unchanged from previous exam. No new peritoneal lesions.    2. 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    10/20/14:  5. CT chest/abdomen/pelvis on 10/16/14 showed nodular peritoneal soft tissue mass in the right lower quadrant adjacent to the psoas muscle is no longer appreciated. Adjacent prominent lymphadenopathy is unchanged from previous exam. 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  1/27/15:  6.  4/28/15:  14.  5/26/15:  18.  6/2/15: CT cap Impression:  1. Postsurgical changes of hysterectomy and bilateral salpingo-oophorectomy for ovarian cancer. There is a new 8 mm hazy, ill-defined hypoattenuating lesion in hepatic segment 6 which is suspicious for a metastatic deposit. Further evaluation with ultrasound in recommended.    2. Increased size of a left retroperitoneal lymph node which is indeterminate but may represent a ciro metastasis. Mildly prominent lymph nodes in the right lower quadrant are not significantly changed.  3. Moderate colonic stool burden.    6/4/15: US abdomen IMPRESSION:    Hyperechoic lesion in the right hepatic lobe, consistent with hemangioma. This does not corresponds to the area of the lesion seen on CT from 6/2/2015. An MR would be helpful for identifying and characterizing the lesion from the recent CT.  6/12/15: MR abd IMPRESSION:  1. New 20 x 11 mm enhancing lesions between the right obliques, concerning for metastatic disease. This lesions should be amenable to percutaneous biopsy, if indicated.  2. Correlating to the lesion visualized on comparison CT is a hepatic segment 6 subcapsular 7 mm lesion. Overall the appearance favors the diagnosis of a simple cyst. However, there is faint suggestion of mild peripheral arterial enhancement. Although this is favored as  artifactual,  this should be followed up to confirm stability. Recommend 6 month followup.  3. Hepatic segment 6, 5 mm lesion too small to technically characterize. Differential would favor FNH, less likely flash filling hemangioma. Recommend attention on followup.  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     9/1/15: Cycle #1 Avastin/Cytoxan.   31.     9/24/15:feeling generally well. She says she has been having back and stomach spasms. She is taking cytosine daily (she ran out yesterday). She also says its affecting her voice. Admits that it burns occasionally. She is eating and drinking normal. She also admit diarrhea, 5-7 times daily, lose/watery. She trying to stay hydrated and eat fiber. She also says that her body is sore, especially the bottom of her feet. Her blood pressure is normal. She also admits having headache after her first infusion.      9/24/15: Cycle #2 Avastin/Cytoxan.  19.  10/15/15: Cycle #3 Avastin/Cytoxan.  16.     11/6/15: CT c/a/p IMPRESSION:    1. Stable postoperative change of MAR/BSO for ovarian cancer.  2. The lesion in the right flank abdominal musculature is slightly decreased in size. Otherwise, stable examination.  2. No evidence of metastatic disease in the chest.    11/20/15: Treatment planning visit,  16    11/25/15: surgical pathology report  FINAL DIAGNOSIS:  Soft tissue, right oblique muscle mass, excision:  -Recurrent ovarian serous carcinoma  -Carcinoma is present less than 1 mm from one resection margin  -Background skeletal muscle and fibroadipose tissue    1/4/16:  22  1/11/16-1/27/16: Radiation to right flank x 12 treatments  4/7/2016:  94. CT cap IMPRESSION:    In this patient with ovarian cancer status post MAR/BSO and descending/transverse colectomy:  1. No evidence for malignancy in the chest, abdomen, or  pelvis.  2. Stable small hypodense segment 6 liver lesion, appears more likely benign, possibly a cyst.  5/13/16:  124.  6/3/16: PET CT IMPRESSION: In this patient with a history of ovarian cancer:  1. Hypermetabolic and enlarging periaortic and perihepatic lymphadenopathy compatible with metastatic disease, as detailed above.  2. Although hypodense lesion in hepatic segment 6 has been present since 6/2/2015 associated hypermetabolism makes this lesion highly concerning for metastatic disease.    Plan: to start Niraparib under TESARO study.     6/9/16:  137.  6/14/16: Cycle #1 Niraparib.    6/28/16: Cycle #1 D15 Niraparib.    7/5/16:  100.    7/11/16: Cycle #2 Nraparib.   83.    8/3/2016: PET CT IMPRESSION:    In this patient with known history of ovarian cancer:  1) New pleural based nodular opacities in the lateral and inferior aspects of the bilateral lower lobes, worse in the left lung. Likely infection. Close follow up is recommended.      2) Slight decrease in hypermetabolic abdominal lymphadenopathy. 2 hypermetabolic lymph nodes persist.  3) Unchanged right hepatic lobe metastatic lesion.     8/9/16: Cycle #3 Niraparib.  69.  9/6/16: Cycle #4 Niraparib.  53. Dose held due to anemia.  9/13/16: Eval for potential cycle 4 niraparib. Dose held due to anemia.  10/4/16: CT CAP impression:  IMPRESSION: In this patient with a known history of ovarian cancer:  1. There has been interval resolution of pleural-based nodular  opacities which likely represented infection.  2. Abdominal lymphadenopathy in the form of 2 portacaval lymph nodes  have not significantly changed in size, noted to be hypermetabolic on  prior PET/CT.  3. Previously demonstrated metastatic lesion in the right lobe of the  liver is not significantly changed.  10/11/16:  60  11/1/16: C6 niraparib,  75  11/29/16: C7 niraparib.  78. CT CAP impression as follows:  Target lesions (RECIST criteria):        A previously described target lesion superior to the head of the  pancreas (series 2, image 64)  (referred to as a perihepatic node on  6/3/2016) may not be a valid target lesion because it measured less  than 1.5 cm originally. However, this particular node has decreased in  size, now measuring 7 mm in short axis versus 14 mm on 6/3/2016 when  measured in a similar fashion.       2.3 cm short axis portacaval lymph node on series 2 image 67,  previously 2.0 cm on 10/4/2016.       1.2 cm subtle hypodensity in hepatic segment 6 on series 2 image 75,  stable on multiple studies since at least 6/3/2016     Sum of diameters today: 3.5 cm. Sum of diameters 10/4/2016: 3.2 cm.  Growth = 9%.    12/27/16: C8 niraparib.  105.  1/25/17: C9 niraparib.  108.  2/23/17: C10 niraparib.  132.   CT CAP impression:  Sum of target lesion diameters today: 3.7 cm. Sum of target lesion  diameters on 11/28/2016: 3.5 cm. Growth= 6%  1. In this patient with history of ovarian cancer there is stable  disease by RECIST criteria as evidenced by:   1a. Mildly increased size of liver metastasis.  1b. Stable portacaval lymphadenopathy.  1c. No evidence of metastatic disease in the chest.  2. Trace emphysematous changes of the lungs.  3/22/17: C11 niraparib.  132.  4/19/17: C12 niraparib.  127.  5/16/17: CT CAP IMPRESSION: In this patient with ovarian cancer:  1. Mildly increased size of hepatic metastasis segment 6 with subtle increased capsular retraction.  2. Stable edmundo hepatis nodes, with mild increase in size of periaortic lymph nodes.  3. Prominent left supraclavicular lymph node with subtle increase in size compared to prior studies, particularly comparing to 10/4/2016.  4. Mild subtle groundglass opacities in the right upper lobe, not present on prior study, lesser extent in the right lower lobe.  Findings may represent infection, additional consideration is malignancy (less likely), and attention on  follow-up study  Recommended.  Addendum:   Prominent left supraclavicular lymph node (3/25) is stable from most recent CT performed 2/20/2017, currently measuring 14 x 16 mm, previously 14 x 16 mm on 2/20/2017.      The portal caval lymph node/edmundo hepatis lymph node is stable from 2/20/2017, measuring 21 mm.      Clarification of size of the para-aortic lymph node (series 3 image 327). It short axis measurement is 11 mm versus 9 mm on prior study.      Hepatic segment 6 triangular-shaped low density lesion (3/362) measures 14 mm, previously measured 12 mm, demonstrating a  possible/questionable minimal subtle increase in size. Similarly the lymph nodes noted above in the supraclavicular and para-aortic regions demonstrate possible minimal possible subtle increase in size.      5/18/17: Cycle #13 Niraparib.  191.  6/15/17: Cycle #14 Niraparib.  146.  7/13/17: Cycle #15 Niraparib 200 mg.  171     CT (8/9/17):       IMPRESSION:  1. Segment 6 hepatic metastasis is stable to minimally increased in  size.  2. Slight increase in multicentric adenopathy, most pronounced at the  edmundo hepatis. Additional sites include the inferior left  neck/supraclavicular region and retroperitoneum which appear stable to  minimally increased.      8/10/17:  175  9/14/17: MUGA LVEF 54%  9/22/17: C1D1 carboplatin/Doxil.  187.  10/19/17: C2D1 carboplatin/Doxil.  108.  11/17/17: C3D1 carboplatin/Doxil.  82.  12/14/17: C4D1 carboplatin/Doxil.  83.  1/12/18: C5D1 carboplatin/Doxil.  80.  2/9/18: C6D1 carboplatin/Doxil.  71.  3/2/18:  49. Three month treatment break.  6/20/2018:  170. CT CAP:  IMPRESSION: In this patient with history of ovarian cancer:  1. Increased size of a right hepatic lobe lesion and numerous edmundo  hepatis and retroperitoneal lymph nodes compatible with progression of  metastatic disease.  2. New mild intrahepatic biliary ductal dilatation, periportal  edema,  and pericholecystic fluid. Increased soft tissue fullness in the edmundo  hepatis in the expected location of the common hepatic duct. Findings  are suspicious for developing biliary ductal obstruction secondary to  worsening metastatic disease in the edmundo hepatis. Correlation with  liver function tests is recommended. Right upper quadrant ultrasound  may be beneficial.  3. Unchanged left supraclavicular and right hilar lymphadenopathy in  the chest.     8/3/18: C1D1 weekly paclitaxel.  not done.    9/20/18: C2D1 weekly paclitaxel 80mg/m2.  pending.    Past Medical History:   Diagnosis Date     Antiplatelet or antithrombotic long-term use      Ascites      Blood clot in the legs      Diabetes (H)      Ovarian cancer (H)     serous,stg IV     Pleural effusion      Pulmonary embolism (H) 2/2012     Refusal of blood transfusions as patient is Roman [employer]      Short gut syndrome      Subclinical hypothyroidism 4/18/2013     Thrombosis of leg        Past Surgical History:   Procedure Laterality Date     COLECTOMY       COLONOSCOPY  2/1/2012    Procedure:COLONOSCOPY; With Biopsy; Surgeon:TONI SULLIVAN; Location:[facility]     HYSTERECTOMY TOTAL ABD, RHIANNA SALPINGO-OOPHORECTOMY, NODE DISSECTION, TUMOR DEBULKING, COMBINED  2/1/2012    Procedure:COMBINED HYSTERECTOMY TOTAL ABDOMINAL, BILATERAL SALPINGO-OOPHORECTOMY, NODE DISSECTION, TUMOR DEBULKING;  Exploratory Laparotomy, Total Abdominal Hysterectomy, Bilateral Salpingo-Oophorectomy, appendectomy,lysis of adhesions, ileal, ascending, transverse and splenic flexure resection, ileal descending bowel renanastomosis, incidental cystotomy repair, CUSA procedure and colonoscopy ; [facility]     INSERT PORT PERITONEAL ACCESS  4/3/2012    Procedure:INSERT PORT PERITONEAL ACCESS; Intraperitoneal Port Placement (c-arm); Surgeon:SAMUEL CARRASCO; Location:[facility]     INSERT PORT PERITONEAL ACCESS  5/14/2014    Procedure: INSERT PORT PERITONEAL ACCESS;  Surgeon:  Nga Yeung MD;  Location: [facility] OR     INSERT PORT VASCULAR ACCESS       LAPAROSCOPY DIAGNOSTIC (GYN)  5/14/2014    Procedure: LAPAROSCOPY DIAGNOSTIC (GYN);  Surgeon: Nga Yeung MD;  Location: [facility] OR     LAPAROTOMY EXPLORATORY Right 11/25/2015    Procedure: LAPAROTOMY EXPLORATORY;  Surgeon: Nga Yeung MD;  Location: [facility]     LAPAROTOMY, TUMOR DEBULKING, COMBINED  5/14/2014    Procedure: COMBINED LAPAROTOMY, TUMOR DEBULKING;  Surgeon: Nga Yeung MD;  Location: [facility]     REMOVE CATHETER PERITONEAL N/A 8/20/2014    Procedure: REMOVE CATHETER PERITONEAL;  Surgeon: Nga Yeung MD;  Location: [facility] OR     VASCULAR SURGERY      stent left iliac vein       Current Outpatient Prescriptions   Medication     Ascorbic Acid (VITAMIN C PO)     Calcium Carbonate-Vitamin D (CALCIUM + D PO)     cyanocobalamin (VITAMIN B12) 1000 MCG/ML injection     diphenoxylate-atropine (LOMOTIL) 2.5-0.025 MG per tablet     Ferrous Sulfate 324 (65 Fe) MG TBEC     HERBALS     iohexol (OMNIPAQUE) 140 MG/ML SOLN solution     iohexol (OMNIPAQUE) 140 MG/ML SOLN solution     LEVOTHYROXINE SODIUM PO     LORazepam (ATIVAN) 1 MG tablet     LORazepam (ATIVAN) 1 MG tablet     magnesium oxide (MAG-OX) 400 MG tablet     omeprazole (PRILOSEC) 20 MG CR capsule     order for DME     potassium chloride (KLOR-CON) 20 MEQ packet     prochlorperazine (COMPAZINE) 10 MG tablet     prochlorperazine (COMPAZINE) 10 MG tablet     VITAMIN E NATURAL PO     No current facility-administered medications for this visit.      Facility-Administered Medications Ordered in Other Visits   Medication     heparin 100 UNIT/ML injection 500 Units          No Known Allergies    Family History   Problem Relation Age of Onset     Cancer Mother 69     lung, smoker     Cancer Maternal Uncle 65     brain     Colon Cancer Maternal Aunt 80     colon       Social History     Social History     Marital status:      Spouse name: N/A     Number of  children: N/A     Years of education: N/A     Occupational History     Not on file.     Social History Main Topics     Smoking status: Former Smoker     Years: 8.00     Types: Cigarettes     Quit date: 6/21/1980     Smokeless tobacco: Never Used      Comment: started at 11 yo and quit at 16 yo     Alcohol use Yes      Comment: 3x/day wine or jodi     Drug use: No     Sexual activity: Not on file     Other Topics Concern     Not on file     Social History Narrative       Review of Systems     Constitutional:  Positive for fatigue. Negative for fever, chills, weight loss, weight gain, decreased appetite, night sweats, recent stressors, height gain, height loss, post-operative complications, incisional pain, hallucinations, increased energy, hyperactivity and confused.   HENT:  Negative for ear pain, hearing loss, tinnitus, nosebleeds, trouble swallowing, hoarse voice, mouth sores, sore throat, ear discharge, tooth pain, gum tenderness, taste disturbance, smell disturbance, hearing aid, bleeding gums, dry mouth, sinus pain, sinus congestion and neck mass.    Eyes:  Negative for double vision, pain, redness, eye pain, decreased vision, eye watering, eye bulging, eye dryness, flashing lights, spots, floaters, strabismus, tunnel vision, jaundice and eye irritation.   Respiratory:   Negative for cough, hemoptysis, sputum production, shortness of breath, wheezing, sleep disturbances due to breathing, snores loudly, respiratory pain, dyspnea on exertion, cough disturbing sleep and postural dyspnea.    Cardiovascular:  Negative for chest pain, dyspnea on exertion, palpitations, orthopnea, claudication, leg swelling, fingers/toes turn blue, hypertension, hypotension, syncope, history of heart murmur, chest pain on exertion, chest pain at rest, pacemaker, few scattered varicosities, leg pain, sleep disturbances due to breathing, tachycardia, light-headedness, exercise intolerance and edema.   Gastrointestinal:  Positive for  "diarrhea. Negative for heartburn, nausea, vomiting, abdominal pain, constipation, blood in stool, melena, rectal pain, bloating, hemorrhoids, bowel incontinence, jaundice, rectal bleeding, coffee ground emesis and change in stool.   Genitourinary:  Negative for bladder incontinence, dysuria, urgency, hematuria, flank pain, vaginal discharge, difficulty urinating, genital sores, dyspareunia, decreased libido, nocturia, voiding less frequently, arousal difficulty, abnormal vaginal bleeding, excessive menstruation, menstrual changes, hot flashes, vaginal dryness and postmenopausal bleeding.   Musculoskeletal:  Negative for myalgias, back pain, joint swelling, arthralgias, stiffness, muscle cramps, neck pain, bone pain, muscle weakness and fracture.   Skin:  Negative for nail changes, itching, poor wound healing, rash, hair changes, skin changes, acne, warts, poor wound healing, scarring, flaky skin, Raynaud's phenomenon, sensitivity to sunlight and skin thickening.   Neurological:  Positive for tingling. Negative for dizziness, tremors, speech change, seizures, loss of consciousness, weakness, light-headedness, numbness, headaches, disturbances in coordination, extremity numbness, memory loss, difficulty walking and paralysis.   Endo/Heme:  Negative for anemia, swollen glands and bruises/bleeds easily.   Psychiatric/Behavioral:  Negative for depression, hallucinations, memory loss, decreased concentration, mood swings and panic attacks.    Breast:  Negative for breast discharge, breast mass, breast pain and nipple retraction.   Endocrine:  Negative for altered temperature regulation, polyphagia, polydipsia, unwanted hair growth and change in facial hair.        Physical Exam:    /68  Pulse 86  Temp 97.1  F (36.2  C) (Tympanic)  Resp 16  Ht 1.651 m (5' 5\")  Wt 64.9 kg (143 lb)  SpO2 96%  BMI 23.8 kg/m2    General: Alert non-toxic appearing female in no acute distress  HEENT: Normocephalic, atraumatic; " PERRLA; no scleral icterus; anicteric sclera; oropharynx pink without lesions; neck supple  Pulmonary: Lungs clear to auscultation, no increased work of breathing noted  Cardiac: Regular rate and rhythm, S1S2, no clicks, murmurs, rubs, or gallops; bilateral lower extremities without edema  GI: Normoactive bowel sounds x4 quadrants, abdomen soft, non-distended, and non-tender to palpation without masses or organomegaly  : Not indicated  Heme: Cervical, supraclavicular, and inguinal nodes without lymphadenopathy  MSK: Moves all extremities, no obvious muscle wasting  Neuro: No gross deficits, normal gait  Skin: Appropriate color for race, warm and dry, no rashes or lesions to unclothed skin  Psych: Pleasant and interactive, affect bright, makes appropriate eye contact, thought process linear    Labs:      9/20/2018  Day 1   Hemoglobin 11.7 - 15.7 g/dL 12.4   Hematocrit 35.0 - 47.0 % 37.8   Platelet Count 150 - 450 10e9/L 267   Absolute Neutrophil 1.6 - 8.3 10e9/L 2.1   Sodium 133 - 144 mmol/L 140   Potassium 3.4 - 5.3 mmol/L 4.0   Chloride 94 - 109 mmol/L 108   Carbon Dioxide 20 - 32 mmol/L 23   Urea Nitrogen 7 - 30 mg/dL 13   Creatinine 0.52 - 1.04 mg/dL 0.80   Calcium 8.5 - 10.1 mg/dL 8.6   Magnesium 1.6 - 2.3 mg/dL 1.4 (A)   Bilirubin Total 0.2 - 1.3 mg/dL 0.5   ALT 0 - 50 U/L 84 (A)   AST 0 - 45 U/L 51 (A)   Alkaline Phosphatase 40 - 150 U/L 458 (A)   Albumin 3.4 - 5.0 g/dL 3.3 (A)   Protein Total 6.8 - 8.8 g/dL 7.1   WBC 4.0 - 11.0 10e9/L 5.8    pending    Assessment/Plan:  1) Recurrent stage IIIC bilateral ovarian cancer: Currently feeling well and feels that weekly paclitaxel is tolerable, offering a good quality of life at the moment. Proceed with C2D1 weekly paclitaxel 80mg/m2 as originally ordered by Dr. Yeung. To follow up in clinic for provider visit in seven weeks- to call clinic in the interim with concerns. LFTs elevated- no dose reduction of paclitaxel required, ALT and AST improved. Most  likely related to her disease given this was elevated prior to starting paclitaxel. Continue to monitor- reviewed red flags including RUQ pain, fever, chills, vomiting, and jaundice and when to seek further care. Magnesium to be replaced intravenously in clinic, continue home supplementation with magnesium glycinate, though we discussed that the overall dose of magnesium is less in the glycinate form than the oxide form- glycinate has been more tolerable for her. Continue loperamide as needed for diarrhea. Denies need for intervention with neuropathy. Reviewed signs and symptoms for when she should contact the clinic or seek additional care, including but not limited to fever, chills, inability to keep down food or fluids, nausea and vomiting not controlled with antiemetics, and diarrhea leading to dehydration. Patient to contact the clinic with any questions or concerns in the interim.   2) Genetic counseling: Testing has been performed and she is negative for mutations in BRCA1, BRCA2, EPCAM, MLH1, MSH2, MSH6, PMS2, PTEN, and TP53 genes  3) Health maintenance issues discussed include to continue following up with PCP for non-gynecologic concerns.  4) Patient verbalized understanding of and agreement with plan    A total of 20 minutes spent with patient, over 50% spent in counseling and coordination of care.    HAILE De Paz, FNP-C  Family Nurse Practitioner  Gynecologic Oncology  [facility]  Pager: [phone]

## 2018-09-20 ENCOUNTER — HOSPITAL ENCOUNTER (OUTPATIENT)
Facility: CLINIC | Age: 58
Setting detail: SPECIMEN
Discharge: HOME | End: 2018-09-20
Attending: NURSE PRACTITIONER | Admitting: NURSE PRACTITIONER
Payer: COMMERCIAL

## 2018-09-20 ENCOUNTER — INFUSION THERAPY VISIT (OUTPATIENT)
Dept: INFUSION THERAPY | Facility: CLINIC | Age: 58
End: 2018-09-20
Attending: NURSE PRACTITIONER
Payer: COMMERCIAL

## 2018-09-20 ENCOUNTER — ONCOLOGY VISIT (OUTPATIENT)
Dept: ONCOLOGY | Facility: CLINIC | Age: 58
End: 2018-09-20
Attending: NURSE PRACTITIONER
Payer: COMMERCIAL

## 2018-09-20 VITALS
WEIGHT: 143 LBS | SYSTOLIC BLOOD PRESSURE: 108 MMHG | HEIGHT: 65 IN | OXYGEN SATURATION: 96 % | BODY MASS INDEX: 23.82 KG/M2 | DIASTOLIC BLOOD PRESSURE: 68 MMHG | HEART RATE: 86 BPM | TEMPERATURE: 97.1 F | RESPIRATION RATE: 16 BRPM

## 2018-09-20 DIAGNOSIS — C56.2 OVARIAN CANCER, LEFT (H): ICD-10-CM

## 2018-09-20 DIAGNOSIS — D70.2 DRUG-INDUCED NEUTROPENIA (H): ICD-10-CM

## 2018-09-20 DIAGNOSIS — C56.1 OVARIAN CANCER, RIGHT (H): ICD-10-CM

## 2018-09-20 DIAGNOSIS — Z79.899 ENCOUNTER FOR LONG-TERM (CURRENT) USE OF MEDICATIONS: ICD-10-CM

## 2018-09-20 DIAGNOSIS — Z79.899 ENCOUNTER FOR LONG-TERM (CURRENT) USE OF MEDICATIONS: Primary | ICD-10-CM

## 2018-09-20 DIAGNOSIS — E83.42 HYPOMAGNESEMIA: ICD-10-CM

## 2018-09-20 DIAGNOSIS — C56.1 OVARIAN CANCER, RIGHT (H): Primary | ICD-10-CM

## 2018-09-20 LAB
ALBUMIN SERPL-MCNC: 3.3 G/DL (ref 3.4–5)
ALP SERPL-CCNC: 458 U/L (ref 40–150)
ALT SERPL W P-5'-P-CCNC: 84 U/L (ref 0–50)
ANION GAP SERPL CALCULATED.3IONS-SCNC: 9 MMOL/L (ref 3–14)
AST SERPL W P-5'-P-CCNC: 51 U/L (ref 0–45)
BASOPHILS # BLD AUTO: 0.1 10E9/L (ref 0–0.2)
BASOPHILS NFR BLD AUTO: 1 %
BILIRUB SERPL-MCNC: 0.5 MG/DL (ref 0.2–1.3)
BUN SERPL-MCNC: 13 MG/DL (ref 7–30)
CALCIUM SERPL-MCNC: 8.6 MG/DL (ref 8.5–10.1)
CANCER AG125 SERPL-ACNC: 56 U/ML (ref 0–30)
CHLORIDE SERPL-SCNC: 108 MMOL/L (ref 94–109)
CO2 SERPL-SCNC: 23 MMOL/L (ref 20–32)
CREAT SERPL-MCNC: 0.8 MG/DL (ref 0.52–1.04)
DIFFERENTIAL METHOD BLD: ABNORMAL
EOSINOPHIL # BLD AUTO: 0.2 10E9/L (ref 0–0.7)
EOSINOPHIL NFR BLD AUTO: 4.1 %
ERYTHROCYTE [DISTWIDTH] IN BLOOD BY AUTOMATED COUNT: 16.3 % (ref 10–15)
GFR SERPL CREATININE-BSD FRML MDRD: 74 ML/MIN/1.7M2
GLUCOSE SERPL-MCNC: 99 MG/DL (ref 70–99)
HCT VFR BLD AUTO: 37.8 % (ref 35–47)
HGB BLD-MCNC: 12.4 G/DL (ref 11.7–15.7)
IMM GRANULOCYTES # BLD: 0.1 10E9/L (ref 0–0.4)
IMM GRANULOCYTES NFR BLD: 2.2 %
LYMPHOCYTES # BLD AUTO: 2.5 10E9/L (ref 0.8–5.3)
LYMPHOCYTES NFR BLD AUTO: 42.1 %
MAGNESIUM SERPL-MCNC: 1.4 MG/DL (ref 1.6–2.3)
MCH RBC QN AUTO: 35.4 PG (ref 26.5–33)
MCHC RBC AUTO-ENTMCNC: 32.8 G/DL (ref 31.5–36.5)
MCV RBC AUTO: 108 FL (ref 78–100)
MONOCYTES # BLD AUTO: 0.8 10E9/L (ref 0–1.3)
MONOCYTES NFR BLD AUTO: 13.9 %
NEUTROPHILS # BLD AUTO: 2.1 10E9/L (ref 1.6–8.3)
NEUTROPHILS NFR BLD AUTO: 36.7 %
NRBC # BLD AUTO: 0 10*3/UL
NRBC BLD AUTO-RTO: 0 /100
PLATELET # BLD AUTO: 267 10E9/L (ref 150–450)
POTASSIUM SERPL-SCNC: 4 MMOL/L (ref 3.4–5.3)
PROT SERPL-MCNC: 7.1 G/DL (ref 6.8–8.8)
RBC # BLD AUTO: 3.5 10E12/L (ref 3.8–5.2)
SODIUM SERPL-SCNC: 140 MMOL/L (ref 133–144)
WBC # BLD AUTO: 5.8 10E9/L (ref 4–11)

## 2018-09-20 PROCEDURE — 25000128 H RX IP 250 OP 636: Performed by: NURSE PRACTITIONER

## 2018-09-20 PROCEDURE — 99213 OFFICE O/P EST LOW 20 MIN: CPT | Performed by: NURSE PRACTITIONER

## 2018-09-20 PROCEDURE — 85025 COMPLETE CBC W/AUTO DIFF WBC: CPT | Performed by: NURSE PRACTITIONER

## 2018-09-20 PROCEDURE — 80053 COMPREHEN METABOLIC PANEL: CPT | Performed by: NURSE PRACTITIONER

## 2018-09-20 PROCEDURE — 96367 TX/PROPH/DG ADDL SEQ IV INF: CPT

## 2018-09-20 PROCEDURE — 96413 CHEMO IV INFUSION 1 HR: CPT

## 2018-09-20 PROCEDURE — 25000132 ZZH RX MED GY IP 250 OP 250 PS 637: Performed by: NURSE PRACTITIONER

## 2018-09-20 PROCEDURE — 96375 TX/PRO/DX INJ NEW DRUG ADDON: CPT

## 2018-09-20 PROCEDURE — 83735 ASSAY OF MAGNESIUM: CPT | Performed by: NURSE PRACTITIONER

## 2018-09-20 PROCEDURE — 25000125 ZZHC RX 250: Performed by: NURSE PRACTITIONER

## 2018-09-20 PROCEDURE — 86304 IMMUNOASSAY TUMOR CA 125: CPT | Performed by: NURSE PRACTITIONER

## 2018-09-20 RX ORDER — HEPARIN SODIUM (PORCINE) LOCK FLUSH IV SOLN 100 UNIT/ML 100 UNIT/ML
500 SOLUTION INTRAVENOUS EVERY 8 HOURS
Status: CANCELLED
Start: 2018-09-20

## 2018-09-20 RX ORDER — DIPHENHYDRAMINE HYDROCHLORIDE 50 MG/ML
50 INJECTION INTRAMUSCULAR; INTRAVENOUS
Status: CANCELLED
Start: 2018-10-23

## 2018-09-20 RX ORDER — HEPARIN SODIUM (PORCINE) LOCK FLUSH IV SOLN 100 UNIT/ML 100 UNIT/ML
500 SOLUTION INTRAVENOUS EVERY 8 HOURS
Status: CANCELLED
Start: 2018-10-11

## 2018-09-20 RX ORDER — DIPHENHYDRAMINE HYDROCHLORIDE 50 MG/ML
50 INJECTION INTRAMUSCULAR; INTRAVENOUS
Status: CANCELLED
Start: 2018-10-17

## 2018-09-20 RX ORDER — EPINEPHRINE 0.3 MG/.3ML
0.3 INJECTION SUBCUTANEOUS EVERY 5 MIN PRN
Status: CANCELLED | OUTPATIENT
Start: 2018-09-27

## 2018-09-20 RX ORDER — ALBUTEROL SULFATE 90 UG/1
1-2 AEROSOL, METERED RESPIRATORY (INHALATION)
Status: CANCELLED
Start: 2018-09-27

## 2018-09-20 RX ORDER — MEPERIDINE HYDROCHLORIDE 25 MG/ML
25 INJECTION INTRAMUSCULAR; INTRAVENOUS; SUBCUTANEOUS EVERY 30 MIN PRN
Status: CANCELLED | OUTPATIENT
Start: 2018-09-20

## 2018-09-20 RX ORDER — LORAZEPAM 2 MG/ML
1 INJECTION INTRAMUSCULAR EVERY 6 HOURS PRN
Status: CANCELLED
Start: 2018-10-23

## 2018-09-20 RX ORDER — LORAZEPAM 2 MG/ML
1 INJECTION INTRAMUSCULAR EVERY 6 HOURS PRN
Status: CANCELLED
Start: 2018-10-04

## 2018-09-20 RX ORDER — METHYLPREDNISOLONE SODIUM SUCCINATE 125 MG/2ML
125 INJECTION, POWDER, LYOPHILIZED, FOR SOLUTION INTRAMUSCULAR; INTRAVENOUS
Status: CANCELLED
Start: 2018-10-11

## 2018-09-20 RX ORDER — ALBUTEROL SULFATE 0.83 MG/ML
2.5 SOLUTION RESPIRATORY (INHALATION)
Status: CANCELLED | OUTPATIENT
Start: 2018-10-17

## 2018-09-20 RX ORDER — LORAZEPAM 2 MG/ML
1 INJECTION INTRAMUSCULAR EVERY 6 HOURS PRN
Status: CANCELLED
Start: 2018-09-27

## 2018-09-20 RX ORDER — EPINEPHRINE 0.3 MG/.3ML
0.3 INJECTION SUBCUTANEOUS EVERY 5 MIN PRN
Status: CANCELLED | OUTPATIENT
Start: 2018-09-20

## 2018-09-20 RX ORDER — DIPHENHYDRAMINE HCL 25 MG
25 CAPSULE ORAL ONCE
Status: CANCELLED
Start: 2018-09-27

## 2018-09-20 RX ORDER — DIPHENHYDRAMINE HCL 25 MG
25 CAPSULE ORAL ONCE
Status: CANCELLED
Start: 2018-10-17

## 2018-09-20 RX ORDER — EPINEPHRINE 1 MG/ML
0.3 INJECTION, SOLUTION INTRAMUSCULAR; SUBCUTANEOUS EVERY 5 MIN PRN
Status: CANCELLED | OUTPATIENT
Start: 2018-10-17

## 2018-09-20 RX ORDER — ALBUTEROL SULFATE 90 UG/1
1-2 AEROSOL, METERED RESPIRATORY (INHALATION)
Status: CANCELLED
Start: 2018-10-11

## 2018-09-20 RX ORDER — LORAZEPAM 2 MG/ML
1 INJECTION INTRAMUSCULAR EVERY 6 HOURS PRN
Status: CANCELLED
Start: 2018-10-11

## 2018-09-20 RX ORDER — LORAZEPAM 2 MG/ML
1 INJECTION INTRAMUSCULAR EVERY 6 HOURS PRN
Status: CANCELLED
Start: 2018-10-17

## 2018-09-20 RX ORDER — EPINEPHRINE 1 MG/ML
0.3 INJECTION, SOLUTION INTRAMUSCULAR; SUBCUTANEOUS EVERY 5 MIN PRN
Status: CANCELLED | OUTPATIENT
Start: 2018-09-27

## 2018-09-20 RX ORDER — MEPERIDINE HYDROCHLORIDE 25 MG/ML
25 INJECTION INTRAMUSCULAR; INTRAVENOUS; SUBCUTANEOUS EVERY 30 MIN PRN
Status: CANCELLED | OUTPATIENT
Start: 2018-10-17

## 2018-09-20 RX ORDER — METHYLPREDNISOLONE SODIUM SUCCINATE 125 MG/2ML
125 INJECTION, POWDER, LYOPHILIZED, FOR SOLUTION INTRAMUSCULAR; INTRAVENOUS
Status: CANCELLED
Start: 2018-10-04

## 2018-09-20 RX ORDER — ALBUTEROL SULFATE 0.83 MG/ML
2.5 SOLUTION RESPIRATORY (INHALATION)
Status: CANCELLED | OUTPATIENT
Start: 2018-09-27

## 2018-09-20 RX ORDER — SODIUM CHLORIDE 9 MG/ML
1000 INJECTION, SOLUTION INTRAVENOUS CONTINUOUS PRN
Status: CANCELLED
Start: 2018-09-27

## 2018-09-20 RX ORDER — DIPHENHYDRAMINE HCL 25 MG
25 CAPSULE ORAL ONCE
Status: CANCELLED
Start: 2018-10-04

## 2018-09-20 RX ORDER — SODIUM CHLORIDE 9 MG/ML
1000 INJECTION, SOLUTION INTRAVENOUS CONTINUOUS PRN
Status: CANCELLED
Start: 2018-10-23

## 2018-09-20 RX ORDER — EPINEPHRINE 0.3 MG/.3ML
0.3 INJECTION SUBCUTANEOUS EVERY 5 MIN PRN
Status: CANCELLED | OUTPATIENT
Start: 2018-10-04

## 2018-09-20 RX ORDER — MEPERIDINE HYDROCHLORIDE 25 MG/ML
25 INJECTION INTRAMUSCULAR; INTRAVENOUS; SUBCUTANEOUS EVERY 30 MIN PRN
Status: CANCELLED | OUTPATIENT
Start: 2018-09-27

## 2018-09-20 RX ORDER — ALBUTEROL SULFATE 90 UG/1
1-2 AEROSOL, METERED RESPIRATORY (INHALATION)
Status: CANCELLED
Start: 2018-09-20

## 2018-09-20 RX ORDER — EPINEPHRINE 1 MG/ML
0.3 INJECTION, SOLUTION INTRAMUSCULAR; SUBCUTANEOUS EVERY 5 MIN PRN
Status: CANCELLED | OUTPATIENT
Start: 2018-10-23

## 2018-09-20 RX ORDER — METHYLPREDNISOLONE SODIUM SUCCINATE 125 MG/2ML
125 INJECTION, POWDER, LYOPHILIZED, FOR SOLUTION INTRAMUSCULAR; INTRAVENOUS
Status: CANCELLED
Start: 2018-09-27

## 2018-09-20 RX ORDER — ALBUTEROL SULFATE 0.83 MG/ML
2.5 SOLUTION RESPIRATORY (INHALATION)
Status: CANCELLED | OUTPATIENT
Start: 2018-10-04

## 2018-09-20 RX ORDER — SODIUM CHLORIDE 9 MG/ML
1000 INJECTION, SOLUTION INTRAVENOUS CONTINUOUS PRN
Status: CANCELLED
Start: 2018-10-04

## 2018-09-20 RX ORDER — MEPERIDINE HYDROCHLORIDE 25 MG/ML
25 INJECTION INTRAMUSCULAR; INTRAVENOUS; SUBCUTANEOUS EVERY 30 MIN PRN
Status: CANCELLED | OUTPATIENT
Start: 2018-10-04

## 2018-09-20 RX ORDER — EPINEPHRINE 1 MG/ML
0.3 INJECTION, SOLUTION INTRAMUSCULAR; SUBCUTANEOUS EVERY 5 MIN PRN
Status: CANCELLED | OUTPATIENT
Start: 2018-10-04

## 2018-09-20 RX ORDER — EPINEPHRINE 0.3 MG/.3ML
0.3 INJECTION SUBCUTANEOUS EVERY 5 MIN PRN
Status: CANCELLED | OUTPATIENT
Start: 2018-10-17

## 2018-09-20 RX ORDER — EPINEPHRINE 1 MG/ML
0.3 INJECTION, SOLUTION INTRAMUSCULAR; SUBCUTANEOUS EVERY 5 MIN PRN
Status: CANCELLED | OUTPATIENT
Start: 2018-10-11

## 2018-09-20 RX ORDER — HEPARIN SODIUM (PORCINE) LOCK FLUSH IV SOLN 100 UNIT/ML 100 UNIT/ML
500 SOLUTION INTRAVENOUS EVERY 8 HOURS
Status: CANCELLED
Start: 2018-10-23

## 2018-09-20 RX ORDER — DIPHENHYDRAMINE HCL 25 MG
25 CAPSULE ORAL ONCE
Status: CANCELLED
Start: 2018-10-23

## 2018-09-20 RX ORDER — EPINEPHRINE 1 MG/ML
0.3 INJECTION, SOLUTION INTRAMUSCULAR; SUBCUTANEOUS EVERY 5 MIN PRN
Status: CANCELLED | OUTPATIENT
Start: 2018-09-20

## 2018-09-20 RX ORDER — HEPARIN SODIUM (PORCINE) LOCK FLUSH IV SOLN 100 UNIT/ML 100 UNIT/ML
500 SOLUTION INTRAVENOUS EVERY 8 HOURS
Status: DISCONTINUED | OUTPATIENT
Start: 2018-09-20 | End: 2018-09-20 | Stop reason: HOSPADM

## 2018-09-20 RX ORDER — MEPERIDINE HYDROCHLORIDE 25 MG/ML
25 INJECTION INTRAMUSCULAR; INTRAVENOUS; SUBCUTANEOUS EVERY 30 MIN PRN
Status: CANCELLED | OUTPATIENT
Start: 2018-10-23

## 2018-09-20 RX ORDER — METHYLPREDNISOLONE SODIUM SUCCINATE 125 MG/2ML
125 INJECTION, POWDER, LYOPHILIZED, FOR SOLUTION INTRAMUSCULAR; INTRAVENOUS
Status: CANCELLED
Start: 2018-10-23

## 2018-09-20 RX ORDER — LORAZEPAM 2 MG/ML
1 INJECTION INTRAMUSCULAR EVERY 6 HOURS PRN
Status: CANCELLED
Start: 2018-09-20

## 2018-09-20 RX ORDER — DIPHENHYDRAMINE HCL 25 MG
25 CAPSULE ORAL ONCE
Status: CANCELLED
Start: 2018-10-11

## 2018-09-20 RX ORDER — SODIUM CHLORIDE 9 MG/ML
1000 INJECTION, SOLUTION INTRAVENOUS CONTINUOUS PRN
Status: CANCELLED
Start: 2018-10-11

## 2018-09-20 RX ORDER — ALBUTEROL SULFATE 90 UG/1
1-2 AEROSOL, METERED RESPIRATORY (INHALATION)
Status: CANCELLED
Start: 2018-10-23

## 2018-09-20 RX ORDER — EPINEPHRINE 0.3 MG/.3ML
0.3 INJECTION SUBCUTANEOUS EVERY 5 MIN PRN
Status: CANCELLED | OUTPATIENT
Start: 2018-10-11

## 2018-09-20 RX ORDER — DIPHENHYDRAMINE HCL 25 MG
25 CAPSULE ORAL ONCE
Status: COMPLETED | OUTPATIENT
Start: 2018-09-20 | End: 2018-09-20

## 2018-09-20 RX ORDER — HEPARIN SODIUM (PORCINE) LOCK FLUSH IV SOLN 100 UNIT/ML 100 UNIT/ML
500 SOLUTION INTRAVENOUS EVERY 8 HOURS
Status: CANCELLED
Start: 2018-10-04

## 2018-09-20 RX ORDER — LOPERAMIDE HCL 2 MG
2 CAPSULE ORAL 4 TIMES DAILY PRN
COMMUNITY

## 2018-09-20 RX ORDER — ALBUTEROL SULFATE 90 UG/1
1-2 AEROSOL, METERED RESPIRATORY (INHALATION)
Status: CANCELLED
Start: 2018-10-04

## 2018-09-20 RX ORDER — SODIUM CHLORIDE 9 MG/ML
1000 INJECTION, SOLUTION INTRAVENOUS CONTINUOUS PRN
Status: CANCELLED
Start: 2018-09-20

## 2018-09-20 RX ORDER — SODIUM CHLORIDE 9 MG/ML
1000 INJECTION, SOLUTION INTRAVENOUS CONTINUOUS PRN
Status: CANCELLED
Start: 2018-10-17

## 2018-09-20 RX ORDER — HEPARIN SODIUM (PORCINE) LOCK FLUSH IV SOLN 100 UNIT/ML 100 UNIT/ML
500 SOLUTION INTRAVENOUS EVERY 8 HOURS
Status: CANCELLED
Start: 2018-09-27

## 2018-09-20 RX ORDER — DIPHENHYDRAMINE HYDROCHLORIDE 50 MG/ML
50 INJECTION INTRAMUSCULAR; INTRAVENOUS
Status: CANCELLED
Start: 2018-10-11

## 2018-09-20 RX ORDER — EPINEPHRINE 0.3 MG/.3ML
0.3 INJECTION SUBCUTANEOUS EVERY 5 MIN PRN
Status: CANCELLED | OUTPATIENT
Start: 2018-10-23

## 2018-09-20 RX ORDER — METHYLPREDNISOLONE SODIUM SUCCINATE 125 MG/2ML
125 INJECTION, POWDER, LYOPHILIZED, FOR SOLUTION INTRAMUSCULAR; INTRAVENOUS
Status: CANCELLED
Start: 2018-10-17

## 2018-09-20 RX ORDER — HEPARIN SODIUM (PORCINE) LOCK FLUSH IV SOLN 100 UNIT/ML 100 UNIT/ML
500 SOLUTION INTRAVENOUS EVERY 8 HOURS
Status: CANCELLED
Start: 2018-10-17

## 2018-09-20 RX ORDER — DIPHENHYDRAMINE HYDROCHLORIDE 50 MG/ML
50 INJECTION INTRAMUSCULAR; INTRAVENOUS
Status: CANCELLED
Start: 2018-10-04

## 2018-09-20 RX ORDER — DIPHENHYDRAMINE HYDROCHLORIDE 50 MG/ML
50 INJECTION INTRAMUSCULAR; INTRAVENOUS
Status: CANCELLED
Start: 2018-09-27

## 2018-09-20 RX ORDER — MEPERIDINE HYDROCHLORIDE 25 MG/ML
25 INJECTION INTRAMUSCULAR; INTRAVENOUS; SUBCUTANEOUS EVERY 30 MIN PRN
Status: CANCELLED | OUTPATIENT
Start: 2018-10-11

## 2018-09-20 RX ORDER — ALBUTEROL SULFATE 0.83 MG/ML
2.5 SOLUTION RESPIRATORY (INHALATION)
Status: CANCELLED | OUTPATIENT
Start: 2018-10-23

## 2018-09-20 RX ORDER — ALBUTEROL SULFATE 90 UG/1
1-2 AEROSOL, METERED RESPIRATORY (INHALATION)
Status: CANCELLED
Start: 2018-10-17

## 2018-09-20 RX ORDER — DIPHENHYDRAMINE HYDROCHLORIDE 50 MG/ML
50 INJECTION INTRAMUSCULAR; INTRAVENOUS
Status: CANCELLED
Start: 2018-09-20

## 2018-09-20 RX ORDER — METHYLPREDNISOLONE SODIUM SUCCINATE 125 MG/2ML
125 INJECTION, POWDER, LYOPHILIZED, FOR SOLUTION INTRAMUSCULAR; INTRAVENOUS
Status: CANCELLED
Start: 2018-09-20

## 2018-09-20 RX ORDER — DIPHENHYDRAMINE HCL 25 MG
25 CAPSULE ORAL ONCE
Status: CANCELLED
Start: 2018-09-20

## 2018-09-20 RX ORDER — ALBUTEROL SULFATE 0.83 MG/ML
2.5 SOLUTION RESPIRATORY (INHALATION)
Status: CANCELLED | OUTPATIENT
Start: 2018-10-11

## 2018-09-20 RX ORDER — ALBUTEROL SULFATE 0.83 MG/ML
2.5 SOLUTION RESPIRATORY (INHALATION)
Status: CANCELLED | OUTPATIENT
Start: 2018-09-20

## 2018-09-20 RX ADMIN — DIPHENHYDRAMINE HYDROCHLORIDE 25 MG: 25 CAPSULE ORAL at 08:57

## 2018-09-20 RX ADMIN — FAMOTIDINE 40 MG: 10 INJECTION, SOLUTION INTRAVENOUS at 09:02

## 2018-09-20 RX ADMIN — DEXAMETHASONE SODIUM PHOSPHATE 12 MG: 10 INJECTION, SOLUTION INTRAMUSCULAR; INTRAVENOUS at 09:07

## 2018-09-20 RX ADMIN — MAGNESIUM SULFATE HEPTAHYDRATE 2 G: 500 INJECTION, SOLUTION INTRAMUSCULAR; INTRAVENOUS at 09:23

## 2018-09-20 RX ADMIN — SODIUM CHLORIDE, PRESERVATIVE FREE 500 UNITS: 5 INJECTION INTRAVENOUS at 11:02

## 2018-09-20 RX ADMIN — PACLITAXEL 138 MG: 6 INJECTION, SOLUTION INTRAVENOUS at 09:59

## 2018-09-20 RX ADMIN — SODIUM CHLORIDE 250 ML: 9 INJECTION, SOLUTION INTRAVENOUS at 08:58

## 2018-09-20 ASSESSMENT — PAIN SCALES - GENERAL: PAINLEVEL: NO PAIN (0)

## 2018-09-20 ASSESSMENT — ENCOUNTER SYMPTOMS
TINGLING: 1
FATIGUE: 1

## 2018-09-20 NOTE — LETTER
2018         RE: Odalys Nathan  [id number]  [facility] [id number]        Dear Colleague,    Thank you for referring your patient, Odalys Nathan, to the [facility]. Please see a copy of my visit note below.    Gynecologic Oncology Follow-Up Note    RE: Odalys Nathan  MRN: [id number]  : 1960  Date of Visit: 2018    CC: Odalys Nathan is a 58 year old year old female with recurrent stage IIIC bilateral ovarian cancer who presents today for disease management with weekly paclitaxel.    HPI: Odalys comes to the clinic feeling fair. She notes fatigue on day three each week and a few days of diarrhea each week relieved with loperamide. Takes magnesium daily which worsens her diarrhea- has switched from mag oxide to mag glycinate, feels this is more tolerable for her. Notes rare tingling in her fingertips, roughly twice per month and denies need for intervention. She continues on ferrous sulfate 325mg TID for history of anemia. She also continues tinnitus- denies need for intervention. Denies fevers, chills, RUQ pain, jaundice, or vomiting. She is eating well, currently drinking well, reports she is ready for chemotherapy today. Had a great time in [place]. Would like chemo adjusted at the end of her cycle so that she can go to a conference in [place] on her off week.        Brief Oncology History:  2012 - Admitted to hospital for 2 weeks of intermittent abdominal cramping, distention, diarrhea and N/V. CT of abdomen/pelvis significant for small bowel obstruction, a heterogenous soft tissue density in the pelvis, omental nodules, and ascites. Bilateral adnexal masses per U/S of pelvis. CA-125 was elevated at 987, CEA was normal at 1.0.    12 - Therapeutic paracentesis (4 L) with cytology confirming malignancy (UT positive, weak ER positive, CK-7 positive consistent with GYN primary). Surgery recommended d/t potential for falling blood  counts 2/2 chemotherapy (patient is [employer] and limiting blood transfusion)    2/1/12 - Exploratory laparotomy, MAR BSO, lysis of adhesion, Appendectomy, Repair cystotomy, Omentectomy Kixy-sctzrm-tfskxsnwp-transverse-colon resection, Ileo-descending colon anastomosis, CUSA, & Colonoscopy (done by Dr. Amato and colorectal team).  2/20/2012 - Admitted to hospital for bilateral pulmonary emboli and drainage of pleural effusion. Started on Lovenox.  2/28/12-3/21/12: Cycle #1-2 Carbo/Taxol IV  3/29/12 - Started on Keflex by her PCP for infection in her healing wound (immediately below her umbilicus).    4/11/12-6/14/12: Cycle #3-6 IV chemo, Cycle #1 IV/IP chemo  7/16/12 -  8, CT PRADEEP - enrolled in  - observation arm  3/4/13-6/28/13:  6, 9, 12, 15, 20.  7/1/13: CT Chest, Abdomen, Pelvis IMPRESSION:  1. Worsening metastatic ovarian carcinoma suggested by increased size of soft tissue nodules anterior to the right psoas muscle, that may represent growing mesenteric lymphadenopathy.  2. Remaining prominent right lower quadrant mesenteric lymph nodes are not significantly changed from CT 7/16/2012.  3. Clustered nodular opacities in the right lower lobe are not significant change from 7/16/2012 and remain indeterminate. Again, the appearance and distribution is suggestive of an infectious etiology.  4. Stable 8 mm soft tissue nodule in left breast, unchanged since at least 02/20/2012. This can be observed on followup studies, but correlation with mammography could be considered.  Decision made to start Doxil/Carbo.  7/11/13: The left ventricular ejection fraction is normal at 66.4%.  7/12/13-9/6/13: Cycle #1-3 Doxil/Carbo.  10, 11.    9/30/13 CT C/A/P Impression:  1. Overall, favorable response to treatment with decreasing size of soft tissue nodules tracking along the anterior aspect of the right psoas muscle.  2. 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  3. Improved cluster of right  lower lobe pulmonary nodular opacities. These may represent resolving infection.  10/3/13-12/9/13:  9, 8, 10. Cycle 4-6 Doxil/Carbo.    1/13/14 CT C/A/P Impression:   1. Stable appearance of metastatic ovarian cancer. Scattered soft tissue nodules along the anterior aspect of the right psoas muscle are unchanged in size. Mild mesenteric lymphadenopathy is unchanged.  2. Clustered micronodules in the right lower lobe are unchanged from 9/30/2013, but improved from 7/1/2013. This history suggests a postinflammatory/postinfectious etiology.  3. 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  1/16/14 Discussed multiple options for her based on relatively stable-appearing disease on CT but slight increase in  (which has had small increase to 20 with last recurrence) including chemo break with recheck of  in 1 month, starting new chemo agent immediately, and exploratory surgery with possible resection of nodules. She is considered platinum-sensitive based on > 1 year remission after Taxol/Carbo, which we will take into consideration for future chemo planning. I am not inclined to surgery at this time given difficulty she already has with diarrhea secondary to past colon resection. I suspect we would need to resect further bowel due to mesenteric disease. Also explained inherent risks of any major surgery. Also mentioned maintenance chemo, but this has not been shown to increase overall survival and would likely decrease her quality of life without significant benefit. Family is going on vacation to [place] in 2 weeks and Odalys does not want to have chemo prior to that, so will plan to take 1 month break. She can have  at that time (discussed checking toady since last draw was in early December, but as it would likely not change treatment plan and she has h/o slow rising , will not check today).  2/17/14  16    2/20/14: Decision to take break from chemo for two months, followed by CT  "and CA-125.    4/21/14  27  4/21/14 CT C/A/P Impression:    1. Increased size of 2 low-attenuation lymph nodes anterior to the right psoas muscle is concerning for worsening metastatic ovarian cancer.    2. New circumferential thickening of a 3.8 cm length segment of distal transverse colon is likely physiologic. Recommend attention on followup imaging.    3. Grossly unchanged size of clustered small nodules versus scarring in the right lower lobe the lungs.    4. Stable thrombosis of the right ovarian vein.  5/14/14: Diagnostic laparoscopy converted to exploratory laparotomy and removal of mesenteric masses, tumor debulking, peritoneal biopsies and intraperitoneal port placement. On laparoscopy, it was noted that there were small nodules on the anterior abdominal wall near the previous incision, small nodules on the right pelvic sidewall as well. Nodules were palpated in the mesentery; however, as it was unable to clarify where the origin of the nodules was, the decision was made to open the patient. On opening there was found to be approximately a 3 cm nodule in the small bowel mesentery and another separate approximately 2 cm nodule in the bowel mesentery. Pelvis without evidence of cancer, some mesenteric lymph nodes were palpated. No evidence otherwise of any disseminated cancer throughout the abdomen.    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 (2/2)  -Largest metastasis: 1.3 cm  -See comment  D: Peritoneum, right paracolic gutter #2, biopsy:  -Fibroadipose tissue with granulomatous inflammation surrounding refractile material  -Negative for malignancy   E: " "Small bowel adhesion, biopsy:  -Fibroconnective tissue, consistent with adhesion  -Negative for malignancy  F: Lymph nodes, mesentry, not otherwise specified, excision:  -Two lymph nodes, negative for metastatic carcinoma (0/2)  G: Lymph node, mesentery, \"#2\", excision:  -One lymph node, negative for metastatic carcinoma (0/1)  H: Lymph nodes, mesentery, \"nodule #2\", excision:  -Five lymph nodes, negative for metastatic carcinoma (0/5)  COMMENT:  Some of the specimens show post-operative changes. Others show possible treatment related changes, including necrosis. The metastatic carcinoma in the mesenteric lymph nodes (specimen C) shows variable morphology, including relatively low grade tumor with papillary architecture, and high grade tumor comprised of nests of tumor cells with irregular, slit-like spaces and marked nuclear pleomorphism.    5/29/14: Cycle 1 IV PACLitaxel / IP CISplatin / IP PACLitaxel.  - 28.  6/26/14: Cycle #2 IV/IP.  10  8/5/14: CT chest/abd/pelvis IMPRESSION     1. In this patient with ovarian cancer, overall findings are indicative of stable/slight improvement, as multiple mesenteric lymphadenopathy and scattered nodular peritoneal soft tissue mass lesions appear unchanged or slightly smaller since 4/21/2014.    2. 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 Mild dilatation of the second and the third portion of the duodenum with a narrow SMA angle. This could represent SMA syndrome, if clinically correlated.17/14:    Cycle #3 IV/IP.  10.    CT chest/abd/pelvis with contrast on 8/5/14    Impression:    1. In this patient with ovarian cancer, overall findings are indicative of stable/slight improvement, as multiple mesenteric lymphadenopathy and scattered nodular peritoneal soft tissue mass lesions appear unchanged or slightly smaller since 4/21/2014.    2. 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 Mild dilatation of the second and the third " portion of the duodenum with a narrow SMA angle. This could represent SMA syndrome, if clinically correlated.  8/7/14: Cycle #4 Taxol/Carbo (changed from IV/IP).  10. She has been feeling okay. She is unsure if she can finish out the course of 6 cycles IV/IP taxol/cisplatin. She feels like she has the flu for about a week then starts feeling gradually better after each chemo cycle. Her spouse notes that she actually has been more sick with the treatments than she initially admits here. She was also previously having some rib pain. Denies any rib pain now. Denies any chest pain or shortness of breath.  Plan: discussed recent CT cap results and switching to just IV as she is feeling miserable with IP treatments. Switch to IV carbo/taxol as patient is platinum sensitive.  We also discussed her taking part of the tesaro trial, which would require BRCA testing. She would like to take part in this trial if eligible.  8/20/14: Remove Intraperitoneal Port ( Port and catheter intact - discarded)  8/28/14: Cycle #5 Taxol/Carbo held due to thrombocytopenia.  6.    She denies any vaginal bleeding, no changes in her bowel or bladder habits, no nausea/emesis, no lower extremity edema, and no difficulties eating or sleeping. She denies any abdominal discomfort/bloating, no fevers or chills, and no chest pain or shortness of breath. She states her diarrhea is the same. She reports some fatigue which improves about 1-2 weeks after her chemotherapy. She states she does not need any medication refills and she was told she does not meet the criteria for the TESARO trial. She states she has 3 bags of iv fluids left over from her previous chemotherapy and will give these to herself. She states she is ready for her treatment today.    9/29/14: Cycle #6 Taxol/Carbo  6. Insurance questions regarding GSF coverage today. No concerns other than fatigue. Taking iron for anemia and does not desire blood transfusion. Using  neulasta for neutropenia. Using home IV hydration if needed. Baseline unchanged. No abdominal bloating, constipation, diarrhea, pain, vaginal or rectal bleeding, cough or dyspnea, fluid retention.    10/16/14: Impression:    1. Nodular peritoneal soft tissue mass in the right lower quadrant adjacent to the psoas muscle is no longer appreciated. Adjacent prominent lymphadenopathy is unchanged from previous exam. No new peritoneal lesions.    2. 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    10/20/14:  5. CT chest/abdomen/pelvis on 10/16/14 showed nodular peritoneal soft tissue mass in the right lower quadrant adjacent to the psoas muscle is no longer appreciated. Adjacent prominent lymphadenopathy is unchanged from previous exam. 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  1/27/15:  6.  4/28/15:  14.  5/26/15:  18.  6/2/15: CT cap Impression:  1. Postsurgical changes of hysterectomy and bilateral salpingo-oophorectomy for ovarian cancer. There is a new 8 mm hazy, ill-defined hypoattenuating lesion in hepatic segment 6 which is suspicious for a metastatic deposit. Further evaluation with ultrasound in recommended.    2. Increased size of a left retroperitoneal lymph node which is indeterminate but may represent a ciro metastasis. Mildly prominent lymph nodes in the right lower quadrant are not significantly changed.  3. Moderate colonic stool burden.    6/4/15: US abdomen IMPRESSION:    Hyperechoic lesion in the right hepatic lobe, consistent with hemangioma. This does not corresponds to the area of the lesion seen on CT from 6/2/2015. An MR would be helpful for identifying and characterizing the lesion from the recent CT.  6/12/15: MR abd IMPRESSION:  1. New 20 x 11 mm enhancing lesions between the right obliques, concerning for metastatic disease. This lesions should be amenable to percutaneous biopsy, if indicated.  2. Correlating to  the lesion visualized on comparison CT is a hepatic segment 6 subcapsular 7 mm lesion. Overall the appearance favors the diagnosis of a simple cyst. However, there is faint suggestion of mild peripheral arterial enhancement. Although this is favored as  artifactual, this should be followed up to confirm stability. Recommend 6 month followup.  3. Hepatic segment 6, 5 mm lesion too small to technically characterize. Differential would favor FNH, less likely flash filling hemangioma. Recommend attention on followup.  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     9/1/15: Cycle #1 Avastin/Cytoxan.   31.     9/24/15:feeling generally well. She says she has been having back and stomach spasms. She is taking cytosine daily (she ran out yesterday). She also says its affecting her voice. Admits that it burns occasionally. She is eating and drinking normal. She also admit diarrhea, 5-7 times daily, lose/watery. She trying to stay hydrated and eat fiber. She also says that her body is sore, especially the bottom of her feet. Her blood pressure is normal. She also admits having headache after her first infusion.      9/24/15: Cycle #2 Avastin/Cytoxan.  19.  10/15/15: Cycle #3 Avastin/Cytoxan.  16.     11/6/15: CT c/a/p IMPRESSION:    1. Stable postoperative change of MAR/BSO for ovarian cancer.  2. The lesion in the right flank abdominal musculature is slightly decreased in size. Otherwise, stable examination.  2. No evidence of metastatic disease in the chest.    11/20/15: Treatment planning visit,  16    11/25/15: surgical pathology report  FINAL DIAGNOSIS:  Soft tissue, right oblique muscle mass, excision:  -Recurrent ovarian serous carcinoma  -Carcinoma is present less than 1 mm from one resection margin  -Background skeletal muscle and fibroadipose tissue    1/4/16:   22  1/11/16-1/27/16: Radiation to right flank x 12 treatments  4/7/2016:  94. CT cap IMPRESSION:    In this patient with ovarian cancer status post MAR/BSO and descending/transverse colectomy:  1. No evidence for malignancy in the chest, abdomen, or pelvis.  2. Stable small hypodense segment 6 liver lesion, appears more likely benign, possibly a cyst.  5/13/16:  124.  6/3/16: PET CT IMPRESSION: In this patient with a history of ovarian cancer:  1. Hypermetabolic and enlarging periaortic and perihepatic lymphadenopathy compatible with metastatic disease, as detailed above.  2. Although hypodense lesion in hepatic segment 6 has been present since 6/2/2015 associated hypermetabolism makes this lesion highly concerning for metastatic disease.    Plan: to start Niraparib under TESARO study.     6/9/16:  137.  6/14/16: Cycle #1 Niraparib.    6/28/16: Cycle #1 D15 Niraparib.    7/5/16:  100.    7/11/16: Cycle #2 Nraparib.   83.    8/3/2016: PET CT IMPRESSION:    In this patient with known history of ovarian cancer:  1) New pleural based nodular opacities in the lateral and inferior aspects of the bilateral lower lobes, worse in the left lung. Likely infection. Close follow up is recommended.      2) Slight decrease in hypermetabolic abdominal lymphadenopathy. 2 hypermetabolic lymph nodes persist.  3) Unchanged right hepatic lobe metastatic lesion.     8/9/16: Cycle #3 Niraparib.  69.  9/6/16: Cycle #4 Niraparib.  53. Dose held due to anemia.  9/13/16: Eval for potential cycle 4 niraparib. Dose held due to anemia.  10/4/16: CT CAP impression:  IMPRESSION: In this patient with a known history of ovarian cancer:  1. There has been interval resolution of pleural-based nodular  opacities which likely represented infection.  2. Abdominal lymphadenopathy in the form of 2 portacaval lymph nodes  have not significantly changed in size, noted to be hypermetabolic on  prior PET/CT.  3.  Previously demonstrated metastatic lesion in the right lobe of the  liver is not significantly changed.  10/11/16:  60  11/1/16: C6 niraparib,  75  11/29/16: C7 niraparib.  78. CT CAP impression as follows:  Target lesions (RECIST criteria):       A previously described target lesion superior to the head of the  pancreas (series 2, image 64)  (referred to as a perihepatic node on  6/3/2016) may not be a valid target lesion because it measured less  than 1.5 cm originally. However, this particular node has decreased in  size, now measuring 7 mm in short axis versus 14 mm on 6/3/2016 when  measured in a similar fashion.       2.3 cm short axis portacaval lymph node on series 2 image 67,  previously 2.0 cm on 10/4/2016.       1.2 cm subtle hypodensity in hepatic segment 6 on series 2 image 75,  stable on multiple studies since at least 6/3/2016     Sum of diameters today: 3.5 cm. Sum of diameters 10/4/2016: 3.2 cm.  Growth = 9%.    12/27/16: C8 niraparib.  105.  1/25/17: C9 niraparib.  108.  2/23/17: C10 niraparib.  132.   CT CAP impression:  Sum of target lesion diameters today: 3.7 cm. Sum of target lesion  diameters on 11/28/2016: 3.5 cm. Growth= 6%  1. In this patient with history of ovarian cancer there is stable  disease by RECIST criteria as evidenced by:   1a. Mildly increased size of liver metastasis.  1b. Stable portacaval lymphadenopathy.  1c. No evidence of metastatic disease in the chest.  2. Trace emphysematous changes of the lungs.  3/22/17: C11 niraparib.  132.  4/19/17: C12 niraparib.  127.  5/16/17: CT CAP IMPRESSION: In this patient with ovarian cancer:  1. Mildly increased size of hepatic metastasis segment 6 with subtle increased capsular retraction.  2. Stable edmundo hepatis nodes, with mild increase in size of periaortic lymph nodes.  3. Prominent left supraclavicular lymph node with subtle increase in size compared to prior studies, particularly  comparing to 10/4/2016.  4. Mild subtle groundglass opacities in the right upper lobe, not present on prior study, lesser extent in the right lower lobe.  Findings may represent infection, additional consideration is malignancy (less likely), and attention on follow-up study  Recommended.  Addendum:   Prominent left supraclavicular lymph node (3/25) is stable from most recent CT performed 2/20/2017, currently measuring 14 x 16 mm, previously 14 x 16 mm on 2/20/2017.      The portal caval lymph node/edmundo hepatis lymph node is stable from 2/20/2017, measuring 21 mm.      Clarification of size of the para-aortic lymph node (series 3 image 327). It short axis measurement is 11 mm versus 9 mm on prior study.      Hepatic segment 6 triangular-shaped low density lesion (3/362) measures 14 mm, previously measured 12 mm, demonstrating a  possible/questionable minimal subtle increase in size. Similarly the lymph nodes noted above in the supraclavicular and para-aortic regions demonstrate possible minimal possible subtle increase in size.      5/18/17: Cycle #13 Niraparib.  191.  6/15/17: Cycle #14 Niraparib.  146.  7/13/17: Cycle #15 Niraparib 200 mg.  171     CT (8/9/17):       IMPRESSION:  1. Segment 6 hepatic metastasis is stable to minimally increased in  size.  2. Slight increase in multicentric adenopathy, most pronounced at the  edmundo hepatis. Additional sites include the inferior left  neck/supraclavicular region and retroperitoneum which appear stable to  minimally increased.      8/10/17:  175  9/14/17: MUGA LVEF 54%  9/22/17: C1D1 carboplatin/Doxil.  187.  10/19/17: C2D1 carboplatin/Doxil.  108.  11/17/17: C3D1 carboplatin/Doxil.  82.  12/14/17: C4D1 carboplatin/Doxil.  83.  1/12/18: C5D1 carboplatin/Doxil.  80.  2/9/18: C6D1 carboplatin/Doxil.  71.  3/2/18:  49. Three month treatment break.  6/20/2018:  170. CT CAP:  IMPRESSION: In this  patient with history of ovarian cancer:  1. Increased size of a right hepatic lobe lesion and numerous edmundo  hepatis and retroperitoneal lymph nodes compatible with progression of  metastatic disease.  2. New mild intrahepatic biliary ductal dilatation, periportal edema,  and pericholecystic fluid. Increased soft tissue fullness in the edmundo  hepatis in the expected location of the common hepatic duct. Findings  are suspicious for developing biliary ductal obstruction secondary to  worsening metastatic disease in the edmundo hepatis. Correlation with  liver function tests is recommended. Right upper quadrant ultrasound  may be beneficial.  3. Unchanged left supraclavicular and right hilar lymphadenopathy in  the chest.     8/3/18: C1D1 weekly paclitaxel.  not done.    9/20/18: C2D1 weekly paclitaxel 80mg/m2.  pending.    Past Medical History:   Diagnosis Date     Antiplatelet or antithrombotic long-term use      Ascites      Blood clot in the legs      Diabetes (H)      Ovarian cancer (H)     serous,stg IV     Pleural effusion      Pulmonary embolism (H) 2/2012     Refusal of blood transfusions as patient is [employer]      Short gut syndrome      Subclinical hypothyroidism 4/18/2013     Thrombosis of leg        Past Surgical History:   Procedure Laterality Date     COLECTOMY       COLONOSCOPY  2/1/2012    Procedure:COLONOSCOPY; With Biopsy; Surgeon:TONI SULLIVAN; Location:[facility]     HYSTERECTOMY TOTAL ABD, RHIANNA SALPINGO-OOPHORECTOMY, NODE DISSECTION, TUMOR DEBULKING, COMBINED  2/1/2012    Procedure:COMBINED HYSTERECTOMY TOTAL ABDOMINAL, BILATERAL SALPINGO-OOPHORECTOMY, NODE DISSECTION, TUMOR DEBULKING;  Exploratory Laparotomy, Total Abdominal Hysterectomy, Bilateral Salpingo-Oophorectomy, appendectomy,lysis of adhesions, ileal, ascending, transverse and splenic flexure resection, ileal descending bowel renanastomosis, incidental cystotomy repair, CUSA procedure and colonoscopy ; [facility]     INSERT  PORT PERITONEAL ACCESS  4/3/2012    Procedure:INSERT PORT PERITONEAL ACCESS; Intraperitoneal Port Placement (c-arm); Surgeon:SAMUEL CARRASCO; Location:[facility]     INSERT PORT PERITONEAL ACCESS  5/14/2014    Procedure: INSERT PORT PERITONEAL ACCESS;  Surgeon: Nga Yeung MD;  Location: [facility] OR     INSERT PORT VASCULAR ACCESS       LAPAROSCOPY DIAGNOSTIC (GYN)  5/14/2014    Procedure: LAPAROSCOPY DIAGNOSTIC (GYN);  Surgeon: Nga Yeung MD;  Location: [facility]     LAPAROTOMY EXPLORATORY Right 11/25/2015    Procedure: LAPAROTOMY EXPLORATORY;  Surgeon: Nga Yeung MD;  Location: [facility]     LAPAROTOMY, TUMOR DEBULKING, COMBINED  5/14/2014    Procedure: COMBINED LAPAROTOMY, TUMOR DEBULKING;  Surgeon: Nga Yeung MD;  Location: [facility]     REMOVE CATHETER PERITONEAL N/A 8/20/2014    Procedure: REMOVE CATHETER PERITONEAL;  Surgeon: Nga Yeung MD;  Location: [facility]     VASCULAR SURGERY      stent left iliac vein       Current Outpatient Prescriptions   Medication     Ascorbic Acid (VITAMIN C PO)     Calcium Carbonate-Vitamin D (CALCIUM + D PO)     cyanocobalamin (VITAMIN B12) 1000 MCG/ML injection     diphenoxylate-atropine (LOMOTIL) 2.5-0.025 MG per tablet     Ferrous Sulfate 324 (65 Fe) MG TBEC     HERBALS     iohexol (OMNIPAQUE) 140 MG/ML SOLN solution     iohexol (OMNIPAQUE) 140 MG/ML SOLN solution     LEVOTHYROXINE SODIUM PO     LORazepam (ATIVAN) 1 MG tablet     LORazepam (ATIVAN) 1 MG tablet     magnesium oxide (MAG-OX) 400 MG tablet     omeprazole (PRILOSEC) 20 MG CR capsule     order for DME     potassium chloride (KLOR-CON) 20 MEQ packet     prochlorperazine (COMPAZINE) 10 MG tablet     prochlorperazine (COMPAZINE) 10 MG tablet     VITAMIN E NATURAL PO     No current facility-administered medications for this visit.      Facility-Administered Medications Ordered in Other Visits   Medication     heparin 100 UNIT/ML injection 500 Units          No Known Allergies    Family  History   Problem Relation Age of Onset     Cancer Mother 69     lung, smoker     Cancer Maternal Uncle 65     brain     Colon Cancer Maternal Aunt 80     colon       Social History     Social History     Marital status:      Spouse name: N/A     Number of children: N/A     Years of education: N/A     Occupational History     Not on file.     Social History Main Topics     Smoking status: Former Smoker     Years: 8.00     Types: Cigarettes     Quit date: 6/21/1980     Smokeless tobacco: Never Used      Comment: started at 9 yo and quit at 18 yo     Alcohol use Yes      Comment: 3x/day wine or jodi     Drug use: No     Sexual activity: Not on file     Other Topics Concern     Not on file     Social History Narrative       Review of Systems     Constitutional:  Positive for fatigue. Negative for fever, chills, weight loss, weight gain, decreased appetite, night sweats, recent stressors, height gain, height loss, post-operative complications, incisional pain, hallucinations, increased energy, hyperactivity and confused.   HENT:  Negative for ear pain, hearing loss, tinnitus, nosebleeds, trouble swallowing, hoarse voice, mouth sores, sore throat, ear discharge, tooth pain, gum tenderness, taste disturbance, smell disturbance, hearing aid, bleeding gums, dry mouth, sinus pain, sinus congestion and neck mass.    Eyes:  Negative for double vision, pain, redness, eye pain, decreased vision, eye watering, eye bulging, eye dryness, flashing lights, spots, floaters, strabismus, tunnel vision, jaundice and eye irritation.   Respiratory:   Negative for cough, hemoptysis, sputum production, shortness of breath, wheezing, sleep disturbances due to breathing, snores loudly, respiratory pain, dyspnea on exertion, cough disturbing sleep and postural dyspnea.    Cardiovascular:  Negative for chest pain, dyspnea on exertion, palpitations, orthopnea, claudication, leg swelling, fingers/toes turn blue, hypertension,  hypotension, syncope, history of heart murmur, chest pain on exertion, chest pain at rest, pacemaker, few scattered varicosities, leg pain, sleep disturbances due to breathing, tachycardia, light-headedness, exercise intolerance and edema.   Gastrointestinal:  Positive for diarrhea. Negative for heartburn, nausea, vomiting, abdominal pain, constipation, blood in stool, melena, rectal pain, bloating, hemorrhoids, bowel incontinence, jaundice, rectal bleeding, coffee ground emesis and change in stool.   Genitourinary:  Negative for bladder incontinence, dysuria, urgency, hematuria, flank pain, vaginal discharge, difficulty urinating, genital sores, dyspareunia, decreased libido, nocturia, voiding less frequently, arousal difficulty, abnormal vaginal bleeding, excessive menstruation, menstrual changes, hot flashes, vaginal dryness and postmenopausal bleeding.   Musculoskeletal:  Negative for myalgias, back pain, joint swelling, arthralgias, stiffness, muscle cramps, neck pain, bone pain, muscle weakness and fracture.   Skin:  Negative for nail changes, itching, poor wound healing, rash, hair changes, skin changes, acne, warts, poor wound healing, scarring, flaky skin, Raynaud's phenomenon, sensitivity to sunlight and skin thickening.   Neurological:  Positive for tingling. Negative for dizziness, tremors, speech change, seizures, loss of consciousness, weakness, light-headedness, numbness, headaches, disturbances in coordination, extremity numbness, memory loss, difficulty walking and paralysis.   Endo/Heme:  Negative for anemia, swollen glands and bruises/bleeds easily.   Psychiatric/Behavioral:  Negative for depression, hallucinations, memory loss, decreased concentration, mood swings and panic attacks.    Breast:  Negative for breast discharge, breast mass, breast pain and nipple retraction.   Endocrine:  Negative for altered temperature regulation, polyphagia, polydipsia, unwanted hair growth and change in facial  "hair.        Physical Exam:    /68  Pulse 86  Temp 97.1  F (36.2  C) (Tympanic)  Resp 16  Ht 1.651 m (5' 5\")  Wt 64.9 kg (143 lb)  SpO2 96%  BMI 23.8 kg/m2    General: Alert non-toxic appearing female in no acute distress  HEENT: Normocephalic, atraumatic; PERRLA; no scleral icterus; anicteric sclera; oropharynx pink without lesions; neck supple  Pulmonary: Lungs clear to auscultation, no increased work of breathing noted  Cardiac: Regular rate and rhythm, S1S2, no clicks, murmurs, rubs, or gallops; bilateral lower extremities without edema  GI: Normoactive bowel sounds x4 quadrants, abdomen soft, non-distended, and non-tender to palpation without masses or organomegaly  : Not indicated  Heme: Cervical, supraclavicular, and inguinal nodes without lymphadenopathy  MSK: Moves all extremities, no obvious muscle wasting  Neuro: No gross deficits, normal gait  Skin: Appropriate color for race, warm and dry, no rashes or lesions to unclothed skin  Psych: Pleasant and interactive, affect bright, makes appropriate eye contact, thought process linear    Labs:      9/20/2018  Day 1   Hemoglobin 11.7 - 15.7 g/dL 12.4   Hematocrit 35.0 - 47.0 % 37.8   Platelet Count 150 - 450 10e9/L 267   Absolute Neutrophil 1.6 - 8.3 10e9/L 2.1   Sodium 133 - 144 mmol/L 140   Potassium 3.4 - 5.3 mmol/L 4.0   Chloride 94 - 109 mmol/L 108   Carbon Dioxide 20 - 32 mmol/L 23   Urea Nitrogen 7 - 30 mg/dL 13   Creatinine 0.52 - 1.04 mg/dL 0.80   Calcium 8.5 - 10.1 mg/dL 8.6   Magnesium 1.6 - 2.3 mg/dL 1.4 (A)   Bilirubin Total 0.2 - 1.3 mg/dL 0.5   ALT 0 - 50 U/L 84 (A)   AST 0 - 45 U/L 51 (A)   Alkaline Phosphatase 40 - 150 U/L 458 (A)   Albumin 3.4 - 5.0 g/dL 3.3 (A)   Protein Total 6.8 - 8.8 g/dL 7.1   WBC 4.0 - 11.0 10e9/L 5.8    pending    Assessment/Plan:  1) Recurrent stage IIIC bilateral ovarian cancer: Currently feeling well and feels that weekly paclitaxel is tolerable, offering a good quality of life at the moment. " Proceed with C2D1 weekly paclitaxel 80mg/m2 as originally ordered by Dr. Yeung. To follow up in clinic for provider visit in seven weeks- to call clinic in the interim with concerns. LFTs elevated- no dose reduction of paclitaxel required, ALT and AST improved. Most likely related to her disease given this was elevated prior to starting paclitaxel. Continue to monitor- reviewed red flags including RUQ pain, fever, chills, vomiting, and jaundice and when to seek further care. Magnesium to be replaced intravenously in clinic, continue home supplementation with magnesium glycinate, though we discussed that the overall dose of magnesium is less in the glycinate form than the oxide form- glycinate has been more tolerable for her. Continue loperamide as needed for diarrhea. Denies need for intervention with neuropathy. Reviewed signs and symptoms for when she should contact the clinic or seek additional care, including but not limited to fever, chills, inability to keep down food or fluids, nausea and vomiting not controlled with antiemetics, and diarrhea leading to dehydration. Patient to contact the clinic with any questions or concerns in the interim.   2) Genetic counseling: Testing has been performed and she is negative for mutations in BRCA1, BRCA2, EPCAM, MLH1, MSH2, MSH6, PMS2, PTEN, and TP53 genes  3) Health maintenance issues discussed include to continue following up with PCP for non-gynecologic concerns.  4) Patient verbalized understanding of and agreement with plan    A total of 20 minutes spent with patient, over 50% spent in counseling and coordination of care.    HAILE De Paz, FNP-C  Family Nurse Practitioner  Gynecologic Oncology  [facility]  Pager: [phone]          Again, thank you for allowing me to participate in the care of your patient.        Sincerely,        HAILE De Paz CNP

## 2018-09-20 NOTE — PATIENT INSTRUCTIONS
Patricia/labs/Taxol on 11/8 Scheduled  Crissy BATRES    Then weekly Taxol for five more weeks after that Scheduled  Crissy BATRES  AVS given to patient

## 2018-09-20 NOTE — PROGRESS NOTES
Infusion Nursing Note:  Odalys Nathan presents today for C2D1 Taxol and magnesium.    Patient seen by provider today: Yes: Patricia Rocha.   present during visit today: Not Applicable.    Note: Magnesium 1.4, replaced with 2gm IV today.  Benadryl given PO.    Intravenous Access:  Implanted Port.    Treatment Conditions:  Lab Results   Component Value Date    HGB 12.4 09/20/2018     Lab Results   Component Value Date    WBC 5.8 09/20/2018      Lab Results   Component Value Date    ANEU 2.1 09/20/2018     Lab Results   Component Value Date     09/20/2018      Lab Results   Component Value Date     09/20/2018                   Lab Results   Component Value Date    POTASSIUM 4.0 09/20/2018           Lab Results   Component Value Date    MAG 1.4 09/20/2018            Lab Results   Component Value Date    CR 0.80 09/20/2018                   Lab Results   Component Value Date    JOSE ALBERTO 8.6 09/20/2018                Lab Results   Component Value Date    BILITOTAL 0.5 09/20/2018           Lab Results   Component Value Date    ALBUMIN 3.3 09/20/2018                    Lab Results   Component Value Date    ALT 84 09/20/2018           Lab Results   Component Value Date    AST 51 09/20/2018       Results reviewed, labs MET treatment parameters, ok to proceed with treatment.      Post Infusion Assessment:  Patient tolerated infusion without incident.  Blood return noted pre and post infusion.  Site patent and intact, free from redness, edema or discomfort.  No evidence of extravasations.  Access discontinued per protocol.    Discharge Plan:   Copy of AVS reviewed with patient and/or family.  Patient will return 9/27/18 for next appointment.  Patient discharged in stable condition accompanied by: self.  Departure Mode: Ambulatory.    Sonia Pham RN

## 2018-09-20 NOTE — MR AVS SNAPSHOT
After Visit Summary   9/20/2018    Odalys Nathan    MRN: [id number]           Patient Information     Date Of Birth          1960        Visit Information        Provider Department      9/20/2018 7:45 AM RH LAB DRAW 1 [facility]        Today's Diagnoses     Encounter for long-term (current) use of medications    -  1    Ovarian cancer, right (H)        Ovarian cancer, left (H)        Drug-induced neutropenia (H)           Follow-ups after your visit        Your next 10 appointments already scheduled     Sep 20, 2018  9:00 AM CDT   Level 2 with RH INFUSION CHAIR 7   [facility] ([facility])    [facility]  [address]  [place] [address]   [phone]            Sep 27, 2018  8:00 AM CDT   Level 2 with RH INFUSION CHAIR 5   [facility] ([facility])    [facility]  [address]  [place] [address]   [phone]            Oct 04, 2018  8:00 AM CDT   Level 3 with RH INFUSION CHAIR 12   [facility] ([facility])    [facility]  [address]  [place] [address]   [phone]            Oct 11, 2018  8:00 AM CDT   Level 3 with RH INFUSION CHAIR 4   [facility] ([facility])    [facility]  [address]  [place] [address]   [phone]            Oct 17, 2018  8:00 AM CDT   Level 3 with RH INFUSION CHAIR 7   [facility] ([facility])    [facility]  [address]  [place] [address]   [phone]            Oct 23, 2018  8:00 AM CDT   Level 3 with RH INFUSION CHAIR 6   [facility] ([facility])    [facility]  [address] [place] [address]  [facility] [id number] [phone]              Who to contact     If you have questions or need follow up information about today's clinic visit or your schedule please contact [facility] directly at [phone].  Normal or non-critical lab and imaging results will be communicated to you by MyChart, letter or phone within 4 business days after the clinic has received the results. If you do not hear from us within 7 days, please contact the clinic through [employer] or phone. If you have a critical or abnormal lab result, we will notify you by phone as soon as possible.  Submit refill requests through [employer] or call your pharmacy and they will forward the refill request to us. Please allow 3 business days for your refill to be completed.          Additional Information About Your Visit        [employer] Information     [employer] gives you secure access to your electronic health record. If you see a primary care provider, you can also send messages to your care team and make appointments. If you have questions, please call your primary care clinic.  If you do not have a primary care provider, please call [phone] and they will assist you.        Care EveryWhere ID     This is your Care EveryWhere ID. This could be used by other organizations to access your [facility] medical records  [id number]         Blood Pressure from Last 3 Encounters:   09/20/18 108/68   09/06/18 108/78   08/30/18 115/68    Weight from Last 3 Encounters:   09/20/18 64.9 kg (143 lb)   09/06/18 64.9 kg (143 lb 1.6 oz)   08/30/18 64.5 kg (142 lb 4.8 oz)              We Performed the Following          CBC with platelets differential     Comprehensive metabolic panel     Magnesium        Primary Care Provider Office Phone # Fax #    Aubrie Hester [phone]       [facility] [address]  [facility] [address]        Equal Access to Services     MATEO TRISH : Hadii bj santana naima Walter, wasarada luqkelleyha, qachanningta kanorablanka ivanmotoshia szymanski. So [facility] [phone].    ATENCIÓN: Si habla español, tiene a bell disposición servicios gratuitos de asistencia lingüística. Liban al [phone].    We comply with applicable federal civil rights laws and [place] laws. We do not discriminate on the basis of race, color, national origin, age, disability, sex, sexual orientation, or gender identity.            Thank you!     Thank you for choosing [facility]  for your care. Our goal is always to provide you with excellent care. Hearing back from our patients is one way we can continue to improve our services. Please take a few minutes to complete the written survey that you may receive in the mail after your visit with us. Thank you!             Your Updated Medication List - Protect others around you: Learn how to safely use, store and throw away your medicines at www.disposemymeds.org.          This list is accurate as of 9/20/18  8:41 AM.  Always use your most recent med list.                   Brand Name Dispense Instructions for use Diagnosis    CALCIUM + D PO      Take 1 tablet by mouth daily.    Pelvic mass       cyanocobalamin 1000 MCG/ML injection    VITAMIN B12    1 mL    Inject 1 mL (1,000 mcg) into the muscle every 30 days    B12 deficiency       diphenoxylate-atropine 2.5-0.025 MG per tablet    LOMOTIL    60 tablet    Take 2 tablets by mouth 4 times daily as needed for diarrhea    Acute diarrhea       Ferrous Sulfate 324 (65 Fe) MG Tbec     90 tablet    Take 1 tablet by mouth 3 times daily (with meals). Take with a small amount of orange juice. Do not take with calcium.    Ovarian cancer, right (H), Anemia, unspecified type, Ovarian cancer, left (H)       HERBALS      daily        * iohexol 140 MG/ML Soln solution     OMNIPAQUE    140 mL    Mix entire bottle (50ml) of contast with 600ml (20 ounces) of water and drink half 2 hrs prior to CT scan and half 1 hr prior to scan    Ovarian cancer, right (H), Metastatic cancer (H)       * iohexol 140 MG/ML Soln solution    OMNIPAQUE    50 mL    Mix entire bottle (50ml) of contast with 600ml (20 ounces) of water and drink half 2 hrs prior to CT scan and half 1 hr prior to scan    Personal history of ovarian cancer       LEVOTHYROXINE SODIUM PO      Take by mouth daily        loperamide 2 MG capsule    IMODIUM     Take 2 mg by mouth 4 times daily as needed for diarrhea        * LORazepam 1 MG tablet    ATIVAN    30 tablet    Take 1 tablet (1 mg) by mouth every 6 hours as needed (Anxiety, Nausea/Vomiting or Sleep)    Ovarian cancer, unspecified laterality (H)       * LORazepam 1 MG tablet    ATIVAN    30 tablet    Take 1 tablet (1 mg) by mouth every 6 hours as needed (Anxiety, Nausea/Vomiting or Sleep)    Encounter for long-term (current) use of medications, Ovarian cancer, right (H), Ovarian cancer, left (H)       magnesium oxide 400 MG tablet    MAG-OX    90 tablet    Take 1 tablet (400 mg) by mouth 2 times daily    Ovarian cancer, unspecified laterality (H)       omeprazole 20 MG CR capsule    priLOSEC    30 capsule    Take 1 capsule (20 mg) by mouth daily    Dyspepsia       order for DME     3 each    Injection Supplies for Vitamin B12: 3cc syringes w/ 27 gauge needles, 1/2 inch length    B12 deficiency       potassium chloride 20 MEQ Packet    KLOR-CON     Take 20 mEq by mouth daily        * prochlorperazine 10 MG tablet    COMPAZINE    30 tablet    Take 1 tablet (10 mg) by mouth every 6 hours as needed (nausea/vomiting)    Encounter for long-term (current) use of medications, Ovarian cancer, right (H), Ovarian cancer, left (H), Drug-induced neutropenia (H)       * prochlorperazine 10 MG tablet    COMPAZINE    30 tablet    Take 1 tablet (10 mg) by mouth every 6 hours as needed  (Nausea/Vomiting)    Encounter for long-term (current) use of medications, Ovarian cancer, right (H), Ovarian cancer, left (H), Drug-induced neutropenia (H)       VITAMIN C PO      Take 500 mg by mouth daily    Pelvic mass       VITAMIN E NATURAL PO      Take 100 Units by mouth daily        * Notice:  This list has 6 medication(s) that are the same as other medications prescribed for you. Read the directions carefully, and ask your doctor or other care provider to review them with you.

## 2018-09-20 NOTE — MR AVS SNAPSHOT
After Visit Summary   9/20/2018    Odalys Nathan    MRN: [id number]           Patient Information     Date Of Birth          1960        Visit Information        Provider Department      9/20/2018 9:00 AM RH INFUSION CHAIR 7 [facility]        Today's Diagnoses     Encounter for long-term (current) use of medications    -  1    Ovarian cancer, right (H)        Ovarian cancer, left (H)        Drug-induced neutropenia (H)           Follow-ups after your visit        Your next 10 appointments already scheduled     Sep 27, 2018  8:00 AM CDT   Level 2 with RH INFUSION CHAIR 5   [facility] ([facility])    [facility]  [address]  [place] [address]   [phone]            Oct 04, 2018  8:00 AM CDT   Level 3 with RH INFUSION CHAIR 12   [facility] ([facility])    [facility]  [address]  [place] [address]   [phone]            Oct 11, 2018  8:00 AM CDT   Level 3 with RH INFUSION CHAIR 4   [facility] ([facility])    [facility]  [address]  [place] [address]   [phone]            Oct 17, 2018  8:00 AM CDT   Level 3 with RH INFUSION CHAIR 7   [facility] ([facility])    [facility]  [address]  [place] [address]   [phone]            Oct 23, 2018  8:00 AM CDT   Level 3 with RH INFUSION CHAIR 6   [facility] ([facility])    [facility]  [address]  [place] [address]   [phone]            Nov 08, 2018  7:45 AM CST   Lab with Infusion with RH LAB DRAW 1   [facility]  ([facility])    [facility]  [address]  [facility] [address] [phone]            Nov 08, 2018  8:00 AM CST   Return Visit with HAILE De Paz CNP   [facility] ([facility])    [facility]  [address]  [facility] [address] [phone]            Nov 08, 2018  9:00 AM CST   Level 2 with RH INFUSION CHAIR 12   [facility] ([facility])    [facility]  [address]  [facility] [address] [phone]              Who to contact     If you have questions or need follow up information about today's clinic visit or your schedule please contact [facility] INF[facility]ION SERVICES directly at [phone].  Normal or non-critical lab and imaging results will be communicated to you by MyChart, letter or phone within 4 business days after the clinic has received the results. If you do not hear from us within 7 days, please contact the clinic through Serometrixhart or phone. If you have a critical or abnormal lab result, we will notify you by phone as soon as possible.  Submit refill requests through Georgina Goodman or call your pharmacy and they will forward the refill request to us. Please allow 3 business days for your refill to be completed.          Additional Information About Your Visit        MyChart Information     [employer] gives you secure access to your electronic health record. If you see a primary care provider, you can also send messages to your care team and make appointments. If you have questions, please call your primary care clinic.  If you do not have a primary care provider, please call [phone] and they will assist you.        Care EveryWhere ID     This is your Care EveryWhere ID. This could be used by other organizations to access your [place] medical records  [id number]          Blood Pressure from Last 3 Encounters:   09/20/18 108/68   09/06/18 108/78   08/30/18 115/68    Weight from Last 3 Encounters:   09/20/18 64.9 kg (143 lb)   09/06/18 64.9 kg (143 lb 1.6 oz)   08/30/18 64.5 kg (142 lb 4.8 oz)              Today, you had the following     No orders found for display       Primary Care Provider Office Phone # Fax #    Aubrie Hester [phone]       [facility] [address] [facility] [address]        Equal Access to Services     MATEO [facility] CenterFLASH : Hadii bj santana hadvincent Socait, waaxda lumalissa, qaybta kaalmada adedarianada, blanka beckman . So [facility] [phone].    ATENCIÓN: Si habla español, tiene a bell disposición servicios gratuitos de asistencia lingüística. LlF[facility] [phone].    We comply with applicable federal civil rights laws and [place] laws. We do not discriminate on the basis of race, color, national origin, age, disability, sex, sexual orientation, or gender identity.            Thank you!     Thank you for choosing [facility] INF[facility]  for your care. Our goal is always to provide you with excellent care. Hearing back from our patients is one way we can continue to improve our services. Please take a few minutes to complete the written survey that you may receive in the mail after your visit with us. Thank you!             Your Updated Medication List - Protect others around you: Learn how to safely use, store and throw away your medicines at www.disposemymeds.org.          This list is accurate as of 9/20/18 11:39 AM.  Always use your most recent med list.                   Brand Name Dispense Instructions for use Diagnosis    CALCIUM + D PO      Take 1 tablet by mouth daily.    Pelvic mass       cyanocobalamin 1000 MCG/ML injection    VITAMIN B12    1 mL    Inject 1 mL (1,000 mcg) into the muscle every 30 days    B12 deficiency       diphenoxylate-atropine 2.5-0.025 MG per  tablet    LOMOTIL    60 tablet    Take 2 tablets by mouth 4 times daily as needed for diarrhea    Acute diarrhea       Ferrous Sulfate 324 (65 Fe) MG Tbec     90 tablet    Take 1 tablet by mouth 3 times daily (with meals). Take with a small amount of orange juice. Do not take with calcium.    Ovarian cancer, right (H), Anemia, unspecified type, Ovarian cancer, left (H)       HERBALS      daily        * iohexol 140 MG/ML Soln solution    OMNIPAQUE    140 mL    Mix entire bottle (50ml) of contast with 600ml (20 ounces) of water and drink half 2 hrs prior to CT scan and half 1 hr prior to scan    Ovarian cancer, right (H), Metastatic cancer (H)       * iohexol 140 MG/ML Soln solution    OMNIPAQUE    50 mL    Mix entire bottle (50ml) of contast with 600ml (20 ounces) of water and drink half 2 hrs prior to CT scan and half 1 hr prior to scan    Personal history of ovarian cancer       LEVOTHYROXINE SODIUM PO      Take by mouth daily        loperamide 2 MG capsule    IMODIUM     Take 2 mg by mouth 4 times daily as needed for diarrhea        * LORazepam 1 MG tablet    ATIVAN    30 tablet    Take 1 tablet (1 mg) by mouth every 6 hours as needed (Anxiety, Nausea/Vomiting or Sleep)    Ovarian cancer, unspecified laterality (H)       * LORazepam 1 MG tablet    ATIVAN    30 tablet    Take 1 tablet (1 mg) by mouth every 6 hours as needed (Anxiety, Nausea/Vomiting or Sleep)    Encounter for long-term (current) use of medications, Ovarian cancer, right (H), Ovarian cancer, left (H)       magnesium oxide 400 MG tablet    MAG-OX    90 tablet    Take 1 tablet (400 mg) by mouth 2 times daily    Ovarian cancer, unspecified laterality (H)       omeprazole 20 MG CR capsule    priLOSEC    30 capsule    Take 1 capsule (20 mg) by mouth daily    Dyspepsia       order for DME     3 each    Injection Supplies for Vitamin B12: 3cc syringes w/ 27 gauge needles, 1/2 inch length    B12 deficiency       potassium chloride 20 MEQ Packet    KLOR-CON      Take 20 mEq by mouth daily        * prochlorperazine 10 MG tablet    COMPAZINE    30 tablet    Take 1 tablet (10 mg) by mouth every 6 hours as needed (nausea/vomiting)    Encounter for long-term (current) use of medications, Ovarian cancer, right (H), Ovarian cancer, left (H), Drug-induced neutropenia (H)       * prochlorperazine 10 MG tablet    COMPAZINE    30 tablet    Take 1 tablet (10 mg) by mouth every 6 hours as needed (Nausea/Vomiting)    Encounter for long-term (current) use of medications, Ovarian cancer, right (H), Ovarian cancer, left (H), Drug-induced neutropenia (H)       VITAMIN C PO      Take 500 mg by mouth daily    Pelvic mass       VITAMIN E NATURAL PO      Take 100 Units by mouth daily        * Notice:  This list has 6 medication(s) that are the same as other medications prescribed for you. Read the directions carefully, and ask your doctor or other care provider to review them with you.

## 2018-09-20 NOTE — PROGRESS NOTES
Infusion Nursing Note:  Odalys Osmar Nathan presents today for port labs.    Patient seen by provider today: Yes: Patricia   present during visit today: Not Applicable.    Note: Assessment done by NP at appointment.    Intravenous Access:  Lab draw site port, Needle type port, Gauge 20 3/4in.  Labs drawn without difficulty.  Implanted Port.    Treatment Conditions:  Not Applicable.        Post Infusion Assessment:  Patient tolerated procedure without incident.  Site patent and intact, free from redness, edema or discomfort.  No evidence of extravasations.    Discharge Plan:   To clinic for NP appointment.    ARNOLD GILLETTE RN

## 2018-09-20 NOTE — NURSING NOTE
"Oncology Rooming Note    September 20, 2018 8:13 AM   Odalys Nathan is a 58 year old female who presents for:    Chief Complaint   Patient presents with     Oncology Clinic Visit     Ovarian cancer, right/left     Initial Vitals: /68  Pulse 86  Temp 97.1  F (36.2  C) (Tympanic)  Resp 16  Ht 1.651 m (5' 5\")  Wt 64.9 kg (143 lb)  SpO2 96%  BMI 23.8 kg/m2 Estimated body mass index is 23.8 kg/(m^2) as calculated from the following:    Height as of this encounter: 1.651 m (5' 5\").    Weight as of this encounter: 64.9 kg (143 lb). Body surface area is 1.73 meters squared.  No Pain (0) Comment: Data Unavailable   No LMP recorded. Patient has had a hysterectomy.  Allergies reviewed: Yes  Medications reviewed: Yes    Medications: Medication refills not needed today.  Pharmacy name entered into [employer]:    [id number] - [place], [place] - [address] - [place], [place] - [address] [facility] [facility]  [facility]ENDRA SCRIPT  [facility] [place] [address] [place] [address] - [place], [place] - [address] [facility] [address] [facility] [phone]  [place], [place] - [address] [facility]    Clinical concerns: follow up     8 minutes for nursing intake (face to face time)     Sheeba Barnett CMA  DISCHARGE PLAN:  Next appointments: See patient instruction section  Departure Mode: Ambulatory  Accompanied by: self  0 minutes for nursing discharge (face to face time)   Sheeba Barnett CMA                "

## 2018-09-20 NOTE — MR AVS SNAPSHOT
After Visit Summary   9/20/2018    Odalys Nathan    MRN: [id number]           Patient Information     Date Of Birth          1960        Visit Information        Provider Department      9/20/2018 8:00 AM Patricia Rocha APRN CNP [facility]        Today's Diagnoses     Drug-induced neutropenia (H)        Encounter for long-term (current) use of medications        Ovarian cancer, right (H)        Ovarian cancer, left (H)          Care Instructions    Patricia/labs/Taxol on 11/8 Scheduled  Crissy D    Then weekly Taxol for five more weeks after that Scheduled  Crissy D  AVS given to patient            Follow-ups after your visit        Your next 10 appointments already scheduled     Sep 20, 2018  9:00 AM CDT   Level 2 with RH INFUSION CHAIR 7   [facility] ([facility])    [facility]  [address]  [facility] [address]   [phone]            Sep 27, 2018  8:00 AM CDT   Level 2 with RH INFUSION CHAIR 5   [facility] ([facility])    [facility] [place] [facility]  [address]  [facility] [address]   [phone]            Oct 04, 2018  8:00 AM CDT   Level 3 with RH INFUSION CHAIR 12   [facility] ([facility])    [facility]  [address]  [facility] [address]   [phone]            Oct 11, 2018  8:00 AM CDT   Level 3 with RH INFUSION CHAIR 4   [facility] ([facility])    [facility]  [address]  [facility] [address]   [phone]            Oct 17, 2018  8:00 AM CDT   Level 3 with RH INFUSION CHAIR 7   [facility] ([facility])    [facility]  [address]  [facility] [address]   [phone]            Oct 23, 2018  8:00 AM CDT   Level 3 with RH INFUSION CHAIR 6   [facility] ([facility])    [facility]  [address]  [facility] [address]   [phone]            Nov 08, 2018  7:45 AM CST   Lab with Infusion with RH LAB DRAW 1   [facility] ([facility])    [facility] [place] [facility]  [address]  [facility] [address]   [phone]            Nov 08, 2018  8:00 AM CST   Return Visit with HAILE De Paz CNP   [facility] ([facility])    [facility] [place] [facility]  [address]  [facility] [address]   [phone]            Nov 08, 2018  9:00 AM CST   Level 2 with RH INFUSION CHAIR 12   [facility] ([facility])    [facility]  [address]  [facility] [address]   [phone]              Who to contact     If you have questions or need follow up information about today's clinic visit or your schedule please contact [facility] directly at [phone].  Normal or non-critical lab and imaging results will be communicated to you by Bioaxialhart, letter or phone within 4 business days after the clinic has received the results. If you do not hear from us within 7 days, please contact the clinic through Bioaxialhart or phone. If you have a critical or abnormal lab result, we will notify you by phone as soon as possible.  Submit refill requests through [employer] or call your pharmacy and they will forward the refill request to us. Please allow 3 business days for your refill to be completed.          Additional Information About Your Visit        [employer] Information     [employer] gives you secure access to your electronic health record. If you see  "a primary care provider, you can also send messages to your care team and make appointments. If you have questions, please call your primary care clinic.  If you do not have a primary care provider, please call [phone] and they will assist you.        Care EveryWhere ID     This is your Care EveryWhere ID. This could be used by other organizations to access your [place] medical records  [id number]        Your Vitals Were     Pulse Temperature Respirations Height Pulse Oximetry BMI (Body Mass Index)    86 97.1  F (36.2  C) (Tympanic) 16 1.651 m (5' 5\") 96% 23.8 kg/m2       Blood Pressure from Last 3 Encounters:   09/20/18 108/68   09/06/18 108/78   08/30/18 115/68    Weight from Last 3 Encounters:   09/20/18 64.9 kg (143 lb)   09/06/18 64.9 kg (143 lb 1.6 oz)   08/30/18 64.5 kg (142 lb 4.8 oz)              Today, you had the following     No orders found for display       Primary Care Provider Office Phone # Fax #    Aubrie Hester [phone]       [facility] [address] [facility] [address]        Equal Access to Services     SANDY CHAMBERS : Hadii aad ku hadasho Soomaali, waaxda luqadaha, qaybta kaalmada adeegyada, waxay idiin haytoryn marily sims lasvetlana szymanski. So [facility] [phone].    ATENCIÓN: Si habla español, tiene a bell disposición servicios gratuitos de asistencia lingüística. Llame al [phone].    We comply with applicable federal civil rights laws and [place] laws. We do not discriminate on the basis of race, color, national origin, age, disability, sex, sexual orientation, or gender identity.            Thank you!     Thank you for choosing [facility]  for your care. Our goal is always to provide you with excellent care. Hearing back from our patients is one way we can continue to improve our services. Please take a few minutes to complete the written survey that you may receive in the mail after your visit with us. Thank you!      "        Your Updated Medication List - Protect others around you: Learn how to safely use, store and throw away your medicines at www.disposemymeds.org.          This list is accurate as of 9/20/18  8:56 AM.  Always use your most recent med list.                   Brand Name Dispense Instructions for use Diagnosis    CALCIUM + D PO      Take 1 tablet by mouth daily.    Pelvic mass       cyanocobalamin 1000 MCG/ML injection    VITAMIN B12    1 mL    Inject 1 mL (1,000 mcg) into the muscle every 30 days    B12 deficiency       diphenoxylate-atropine 2.5-0.025 MG per tablet    LOMOTIL    60 tablet    Take 2 tablets by mouth 4 times daily as needed for diarrhea    Acute diarrhea       Ferrous Sulfate 324 (65 Fe) MG Tbec     90 tablet    Take 1 tablet by mouth 3 times daily (with meals). Take with a small amount of orange juice. Do not take with calcium.    Ovarian cancer, right (H), Anemia, unspecified type, Ovarian cancer, left (H)       HERBALS      daily        * iohexol 140 MG/ML Soln solution    OMNIPAQUE    140 mL    Mix entire bottle (50ml) of contast with 600ml (20 ounces) of water and drink half 2 hrs prior to CT scan and half 1 hr prior to scan    Ovarian cancer, right (H), Metastatic cancer (H)       * iohexol 140 MG/ML Soln solution    OMNIPAQUE    50 mL    Mix entire bottle (50ml) of contast with 600ml (20 ounces) of water and drink half 2 hrs prior to CT scan and half 1 hr prior to scan    Personal history of ovarian cancer       LEVOTHYROXINE SODIUM PO      Take by mouth daily        loperamide 2 MG capsule    IMODIUM     Take 2 mg by mouth 4 times daily as needed for diarrhea        * LORazepam 1 MG tablet    ATIVAN    30 tablet    Take 1 tablet (1 mg) by mouth every 6 hours as needed (Anxiety, Nausea/Vomiting or Sleep)    Ovarian cancer, unspecified laterality (H)       * LORazepam 1 MG tablet    ATIVAN    30 tablet    Take 1 tablet (1 mg) by mouth every 6 hours as needed (Anxiety, Nausea/Vomiting or  Sleep)    Encounter for long-term (current) use of medications, Ovarian cancer, right (H), Ovarian cancer, left (H)       magnesium oxide 400 MG tablet    MAG-OX    90 tablet    Take 1 tablet (400 mg) by mouth 2 times daily    Ovarian cancer, unspecified laterality (H)       omeprazole 20 MG CR capsule    priLOSEC    30 capsule    Take 1 capsule (20 mg) by mouth daily    Dyspepsia       order for DME     3 each    Injection Supplies for Vitamin B12: 3cc syringes w/ 27 gauge needles, 1/2 inch length    B12 deficiency       potassium chloride 20 MEQ Packet    KLOR-CON     Take 20 mEq by mouth daily        * prochlorperazine 10 MG tablet    COMPAZINE    30 tablet    Take 1 tablet (10 mg) by mouth every 6 hours as needed (nausea/vomiting)    Encounter for long-term (current) use of medications, Ovarian cancer, right (H), Ovarian cancer, left (H), Drug-induced neutropenia (H)       * prochlorperazine 10 MG tablet    COMPAZINE    30 tablet    Take 1 tablet (10 mg) by mouth every 6 hours as needed (Nausea/Vomiting)    Encounter for long-term (current) use of medications, Ovarian cancer, right (H), Ovarian cancer, left (H), Drug-induced neutropenia (H)       VITAMIN C PO      Take 500 mg by mouth daily    Pelvic mass       VITAMIN E NATURAL PO      Take 100 Units by mouth daily        * Notice:  This list has 6 medication(s) that are the same as other medications prescribed for you. Read the directions carefully, and ask your doctor or other care provider to review them with you.

## 2018-09-21 DIAGNOSIS — R79.89 ELEVATED LFTS: Primary | ICD-10-CM

## 2018-09-27 ENCOUNTER — INFUSION THERAPY VISIT (OUTPATIENT)
Dept: INFUSION THERAPY | Facility: CLINIC | Age: 58
End: 2018-09-27
Attending: NURSE PRACTITIONER
Payer: COMMERCIAL

## 2018-09-27 ENCOUNTER — HOSPITAL ENCOUNTER (OUTPATIENT)
Facility: CLINIC | Age: 58
Setting detail: SPECIMEN
Discharge: HOME | End: 2018-09-27
Attending: NURSE PRACTITIONER | Admitting: NURSE PRACTITIONER
Payer: COMMERCIAL

## 2018-09-27 VITALS
HEART RATE: 83 BPM | DIASTOLIC BLOOD PRESSURE: 78 MMHG | WEIGHT: 142.4 LBS | TEMPERATURE: 98.8 F | RESPIRATION RATE: 16 BRPM | OXYGEN SATURATION: 98 % | BODY MASS INDEX: 23.7 KG/M2 | SYSTOLIC BLOOD PRESSURE: 117 MMHG

## 2018-09-27 DIAGNOSIS — D70.2 DRUG-INDUCED NEUTROPENIA (H): ICD-10-CM

## 2018-09-27 DIAGNOSIS — C56.1 OVARIAN CANCER, RIGHT (H): ICD-10-CM

## 2018-09-27 DIAGNOSIS — J32.9 BACTERIAL SINUSITIS: ICD-10-CM

## 2018-09-27 DIAGNOSIS — C56.2 OVARIAN CANCER, LEFT (H): ICD-10-CM

## 2018-09-27 DIAGNOSIS — B96.89 BACTERIAL SINUSITIS: ICD-10-CM

## 2018-09-27 DIAGNOSIS — Z79.899 ENCOUNTER FOR LONG-TERM (CURRENT) USE OF MEDICATIONS: Primary | ICD-10-CM

## 2018-09-27 LAB
BASOPHILS # BLD AUTO: 0.1 10E9/L (ref 0–0.2)
BASOPHILS NFR BLD AUTO: 0.6 %
DIFFERENTIAL METHOD BLD: ABNORMAL
EOSINOPHIL # BLD AUTO: 0.3 10E9/L (ref 0–0.7)
EOSINOPHIL NFR BLD AUTO: 3.5 %
ERYTHROCYTE [DISTWIDTH] IN BLOOD BY AUTOMATED COUNT: 15.2 % (ref 10–15)
HCT VFR BLD AUTO: 35.3 % (ref 35–47)
HGB BLD-MCNC: 11.7 G/DL (ref 11.7–15.7)
IMM GRANULOCYTES # BLD: 0.1 10E9/L (ref 0–0.4)
IMM GRANULOCYTES NFR BLD: 1.1 %
LYMPHOCYTES # BLD AUTO: 2.3 10E9/L (ref 0.8–5.3)
LYMPHOCYTES NFR BLD AUTO: 26.8 %
MAGNESIUM SERPL-MCNC: 1.3 MG/DL (ref 1.6–2.3)
MCH RBC QN AUTO: 35.1 PG (ref 26.5–33)
MCHC RBC AUTO-ENTMCNC: 33.1 G/DL (ref 31.5–36.5)
MCV RBC AUTO: 106 FL (ref 78–100)
MONOCYTES # BLD AUTO: 0.7 10E9/L (ref 0–1.3)
MONOCYTES NFR BLD AUTO: 8.3 %
NEUTROPHILS # BLD AUTO: 5.1 10E9/L (ref 1.6–8.3)
NEUTROPHILS NFR BLD AUTO: 59.7 %
NRBC # BLD AUTO: 0 10*3/UL
NRBC BLD AUTO-RTO: 0 /100
PLATELET # BLD AUTO: 283 10E9/L (ref 150–450)
POTASSIUM SERPL-SCNC: 3.9 MMOL/L (ref 3.4–5.3)
RBC # BLD AUTO: 3.33 10E12/L (ref 3.8–5.2)
WBC # BLD AUTO: 8.5 10E9/L (ref 4–11)

## 2018-09-27 PROCEDURE — 96375 TX/PRO/DX INJ NEW DRUG ADDON: CPT

## 2018-09-27 PROCEDURE — 83735 ASSAY OF MAGNESIUM: CPT | Performed by: NURSE PRACTITIONER

## 2018-09-27 PROCEDURE — 25000128 H RX IP 250 OP 636: Performed by: NURSE PRACTITIONER

## 2018-09-27 PROCEDURE — 84132 ASSAY OF SERUM POTASSIUM: CPT | Performed by: NURSE PRACTITIONER

## 2018-09-27 PROCEDURE — 96413 CHEMO IV INFUSION 1 HR: CPT

## 2018-09-27 PROCEDURE — 96368 THER/DIAG CONCURRENT INF: CPT

## 2018-09-27 PROCEDURE — 96367 TX/PROPH/DG ADDL SEQ IV INF: CPT

## 2018-09-27 PROCEDURE — 25000125 ZZHC RX 250: Performed by: NURSE PRACTITIONER

## 2018-09-27 PROCEDURE — 25000132 ZZH RX MED GY IP 250 OP 250 PS 637: Performed by: NURSE PRACTITIONER

## 2018-09-27 PROCEDURE — 85025 COMPLETE CBC W/AUTO DIFF WBC: CPT | Performed by: NURSE PRACTITIONER

## 2018-09-27 RX ORDER — MAGNESIUM SULFATE HEPTAHYDRATE 40 MG/ML
2 INJECTION, SOLUTION INTRAVENOUS ONCE
Status: COMPLETED | OUTPATIENT
Start: 2018-09-27 | End: 2018-09-27

## 2018-09-27 RX ORDER — HEPARIN SODIUM (PORCINE) LOCK FLUSH IV SOLN 100 UNIT/ML 100 UNIT/ML
500 SOLUTION INTRAVENOUS EVERY 8 HOURS
Status: DISCONTINUED | OUTPATIENT
Start: 2018-09-27 | End: 2018-09-27 | Stop reason: HOSPADM

## 2018-09-27 RX ORDER — DIPHENHYDRAMINE HCL 25 MG
25 CAPSULE ORAL ONCE
Status: COMPLETED | OUTPATIENT
Start: 2018-09-27 | End: 2018-09-27

## 2018-09-27 RX ADMIN — SODIUM CHLORIDE 250 ML: 9 INJECTION, SOLUTION INTRAVENOUS at 08:56

## 2018-09-27 RX ADMIN — SODIUM CHLORIDE, PRESERVATIVE FREE 500 UNITS: 5 INJECTION INTRAVENOUS at 10:25

## 2018-09-27 RX ADMIN — DEXAMETHASONE SODIUM PHOSPHATE 12 MG: 10 INJECTION, SOLUTION INTRAMUSCULAR; INTRAVENOUS at 09:04

## 2018-09-27 RX ADMIN — FAMOTIDINE 40 MG: 10 INJECTION, SOLUTION INTRAVENOUS at 09:00

## 2018-09-27 RX ADMIN — PACLITAXEL 138 MG: 6 INJECTION, SOLUTION INTRAVENOUS at 09:23

## 2018-09-27 RX ADMIN — MAGNESIUM SULFATE IN WATER 2 G: 40 INJECTION, SOLUTION INTRAVENOUS at 09:23

## 2018-09-27 RX ADMIN — DIPHENHYDRAMINE HYDROCHLORIDE 25 MG: 25 CAPSULE ORAL at 08:56

## 2018-09-27 NOTE — MR AVS SNAPSHOT
After Visit Summary   9/27/2018    Odalys Nathan    MRN: [id number]           Patient Information     Date Of Birth          1960        Visit Information        Provider Department      9/27/2018 8:00 AM RH INFUSION CHAIR 1 [facility]        Today's Diagnoses     Encounter for long-term (current) use of medications    -  1    Ovarian cancer, right (H)        Ovarian cancer, left (H)        Drug-induced neutropenia (H)        Bacterial sinusitis           Follow-ups after your visit        Your next 10 appointments already scheduled     Oct 04, 2018  8:00 AM CDT   Level 3 with RH INFUSION CHAIR 12   [facility] ([facility])    [facility]  [address]  [place] [address]   [phone]            Oct 11, 2018  8:00 AM CDT   Level 3 with RH INFUSION CHAIR 4   [facility] ([facility])    [facility]  [address]  [place] [address]   [phone]            Oct 17, 2018  8:00 AM CDT   Level 3 with RH INFUSION CHAIR 7   [facility] ([facility])    [facility]  [address]  [place] [address]   [phone]            Oct 23, 2018  8:00 AM CDT   Level 3 with RH INFUSION CHAIR 6   [facility] ([facility])    [facility]  [address]  [place] [address]   [phone]            Nov 08, 2018  7:45 AM CST   Lab with Infusion with RH LAB DRAW 1   [facility] ([facility])    [facility]  [address]  [place] [address]   [phone]            Nov 08, 2018  8:00 AM CST   Return Visit with HAILE De Paz [facility]  [facility] ([facility])    [facility]  [address]  [facility] [id number] [phone]            Nov 08, 2018  9:00 AM CST   Level 2 with RH INFUSION CHAIR 12   [facility] ([facility])    [facility]  [address]  [facility] [address] [phone]              Who to contact     If you have questions or need follow up information about today's clinic visit or your schedule please contact [facility] INF[facility]ION [facility] directly at [phone].  Normal or non-critical lab and imaging results will be communicated to you by MyChart, letter or phone within 4 business days after the clinic has received the results. If you do not hear from us within 7 days, please contact the clinic through [employer] or phone. If you have a critical or abnormal lab result, we will notify you by phone as soon as possible.  Submit refill requests through [employer]o or call your pharmacy and they will forward the refill request to us. Please allow 3 business days for your refill to be completed.          Additional Information About Your Visit        MyChart Information     [employer] gives you secure access to your electronic health record. If you see a primary care provider, you can also send messages to your care team and make appointments. If you have questions, please call your primary care clinic.  If you do not have a primary care provider, please call [phone] and they will assist you.        Care EveryWhere ID     This is your Care EveryWhere ID. This could be used by other organizations to access your [facility] medical records  [id number]        Your Vitals Were     Pulse Temperature Respirations Pulse Oximetry BMI (Body Mass Index)       83 98.8  F (37.1  C) (Tympanic) 16 98% 23.7 kg/m2        Blood Pressure from Last 3 Encounters:   09/27/18 117/78   09/20/18  108/68   09/06/18 108/78    Weight from Last 3 Encounters:   09/27/18 64.6 kg (142 lb 6.4 oz)   09/20/18 64.9 kg (143 lb)   09/06/18 64.9 kg (143 lb 1.6 oz)              We Performed the Following     CBC with platelets differential     Magnesium     Potassium          Today's Medication Changes          These changes are accurate as of 9/27/18 11:03 AM.  If you have any questions, ask your nurse or doctor.               Start taking these medicines.        Dose/Directions    amoxicillin-clavulanate 875-125 MG per tablet   Commonly known as:  AUGMENTIN   Used for:  Bacterial sinusitis        Dose:  1 tablet   Take 1 tablet by mouth 2 times daily   Quantity:  20 tablet   Refills:  0            Where to get your medicines      These medications were sent to [id number] - [facility] [address]  [address] [facility] [address]    Hours:  9Am-9Pm (M-F) 9Am-6Pm (S&S) Phone:  [phone]     amoxicillin-clavulanate 875-125 MG per tablet                Primary Care Provider Office Phone # Fax #    Aubrie Hester [phone]       [facility] [address] [facility] [address]        Equal Access to Services     SANDY CHAMBERS AH: Felicia landao Soomaali, waaxda luqadaha, qaybta kaalmada adeegyada, waxay roselyn haymarquise szymanski. So [facility] [phone].    ATENCIÓN: Si habla español, tiene a bell disposición servicios gratuitos de asistencia lingüística. Llame al [phone].    We comply with applicable federal civil rights laws and [place] laws. We do not discriminate on the basis of race, color, national origin, age, disability, sex, sexual orientation, or gender identity.            Thank you!     Thank you for choosing [facility] INF[facility]ION [facility]  for your care. Our goal is always to provide you with excellent care. Hearing back from our patients is one way we can continue to improve our services. Please take a few  minutes to complete the written survey that you may receive in the mail after your visit with us. Thank you!             Your Updated Medication List - Protect others around you: Learn how to safely use, store and throw away your medicines at www.disposemymeds.org.          This list is accurate as of 9/27/18 11:03 AM.  Always use your most recent med list.                   Brand Name Dispense Instructions for use Diagnosis    amoxicillin-clavulanate 875-125 MG per tablet    AUGMENTIN    20 tablet    Take 1 tablet by mouth 2 times daily    Bacterial sinusitis       CALCIUM + D PO      Take 1 tablet by mouth daily.    Pelvic mass       cyanocobalamin 1000 MCG/ML injection    VITAMIN B12    1 mL    Inject 1 mL (1,000 mcg) into the muscle every 30 days    B12 deficiency       diphenoxylate-atropine 2.5-0.025 MG per tablet    LOMOTIL    60 tablet    Take 2 tablets by mouth 4 times daily as needed for diarrhea    Acute diarrhea       Ferrous Sulfate 324 (65 Fe) MG Tbec     90 tablet    Take 1 tablet by mouth 3 times daily (with meals). Take with a small amount of orange juice. Do not take with calcium.    Ovarian cancer, right (H), Anemia, unspecified type, Ovarian cancer, left (H)       HERBALS      daily        * iohexol 140 MG/ML Soln solution    OMNIPAQUE    140 mL    Mix entire bottle (50ml) of contast with 600ml (20 ounces) of water and drink half 2 hrs prior to CT scan and half 1 hr prior to scan    Ovarian cancer, right (H), Metastatic cancer (H)       * iohexol 140 MG/ML Soln solution    OMNIPAQUE    50 mL    Mix entire bottle (50ml) of contast with 600ml (20 ounces) of water and drink half 2 hrs prior to CT scan and half 1 hr prior to scan    Personal history of ovarian cancer       LEVOTHYROXINE SODIUM PO      Take by mouth daily        loperamide 2 MG capsule    IMODIUM     Take 2 mg by mouth 4 times daily as needed for diarrhea        * LORazepam 1 MG tablet    ATIVAN    30 tablet    Take 1 tablet (1 mg) by  mouth every 6 hours as needed (Anxiety, Nausea/Vomiting or Sleep)    Ovarian cancer, unspecified laterality (H)       * LORazepam 1 MG tablet    ATIVAN    30 tablet    Take 1 tablet (1 mg) by mouth every 6 hours as needed (Anxiety, Nausea/Vomiting or Sleep)    Encounter for long-term (current) use of medications, Ovarian cancer, right (H), Ovarian cancer, left (H)       magnesium oxide 400 MG tablet    MAG-OX    90 tablet    Take 1 tablet (400 mg) by mouth 2 times daily    Ovarian cancer, unspecified laterality (H)       omeprazole 20 MG CR capsule    priLOSEC    30 capsule    Take 1 capsule (20 mg) by mouth daily    Dyspepsia       order for DME     3 each    Injection Supplies for Vitamin B12: 3cc syringes w/ 27 gauge needles, 1/2 inch length    B12 deficiency       potassium chloride 20 MEQ Packet    KLOR-CON     Take 20 mEq by mouth daily        * prochlorperazine 10 MG tablet    COMPAZINE    30 tablet    Take 1 tablet (10 mg) by mouth every 6 hours as needed (nausea/vomiting)    Encounter for long-term (current) use of medications, Ovarian cancer, right (H), Ovarian cancer, left (H), Drug-induced neutropenia (H)       * prochlorperazine 10 MG tablet    COMPAZINE    30 tablet    Take 1 tablet (10 mg) by mouth every 6 hours as needed (Nausea/Vomiting)    Encounter for long-term (current) use of medications, Ovarian cancer, right (H), Ovarian cancer, left (H), Drug-induced neutropenia (H)       VITAMIN C PO      Take 500 mg by mouth daily    Pelvic mass       VITAMIN E NATURAL PO      Take 100 Units by mouth daily        * Notice:  This list has 6 medication(s) that are the same as other medications prescribed for you. Read the directions carefully, and ask your doctor or other care provider to review them with you.

## 2018-09-27 NOTE — PROGRESS NOTES
Infusion Nursing Note:  Odalys Nathan presents today for Cycle 2, Day 8 Taxol and magnesium replacement.    Patient seen by provider today: Yes: Patricia Rocha NP saw patient in infusion   present during visit today: Not Applicable.    Note: patient has had ongoing sinus congestion, recent headaches and sinus pressure for over 1 month. Denies fevers. Patricia Rocha NP notified. Per Patricia, script for Augmentin sent to her local [facility] pharmacy. Patient instructed to take probiotics and Activia yogurt to help with GI tract due to her already having diarrhea. Patient instructed on risk for yeast infection with antibiotics and to take Mucinex as needed or OTC Flonase or saline nasal spray. Patient stated she's already taking a probiotic. Patient aware to contact clinic for worsening diarrhea.    Intravenous Access:  Labs drawn without difficulty.  Implanted Port.    Treatment Conditions:  Lab Results   Component Value Date    HGB 11.7 09/27/2018     Lab Results   Component Value Date    WBC 8.5 09/27/2018      Lab Results   Component Value Date    ANEU 5.1 09/27/2018     Lab Results   Component Value Date     09/27/2018      Potassium    3.9    Magnesium 1.3    2 Gm Magnesium sulfate given IV  Results reviewed, labs MET treatment parameters, ok to proceed with treatment.    Post Infusion Assessment:  Patient tolerated infusion without incident.  Blood return noted pre and post infusion.  Site patent and intact, free from redness, edema or discomfort.  No evidence of extravasations.  Access discontinued per protocol.    Discharge Plan:   Prescription for Augmentin sent to patient local [facility] pharmacy.  Copy of AVS reviewed with patient and/or family.  Patient will return 10/4 for next appointment.  Patient discharged in stable condition accompanied by: self.  Departure Mode: Ambulatory.    Loree Ambriz RN

## 2018-09-27 NOTE — PROGRESS NOTES
Odalys has had sinus pressure, post-nasal drainage, a mild headache, and has been coughing in the mornings x1 month, feels this is not improving. No fevers or difficulty breathing.  has a respiratory infection at home. Given her immunocompromised state and length of symptoms, will treat for ABRS with Augmentin 875mg PO BID x10 days. To also use guaifenesin, nasal saline, and may benefit from an OTC intranasal steroid. To seek emergency care for T>100.4F. Reviewed side effects of Augmentin, may consider probiotics to prevent worsening diarrhea. Given her normal ANC and stable vitals, will proceed with her weekly Taxol. She verbalized understanding of and agreement with plan.  HAILE De Paz, FNP-C  Gynecologic Oncology  [facility]  Pager: [phone]

## 2018-10-04 ENCOUNTER — INFUSION THERAPY VISIT (OUTPATIENT)
Dept: INFUSION THERAPY | Facility: CLINIC | Age: 58
End: 2018-10-04
Attending: NURSE PRACTITIONER
Payer: COMMERCIAL

## 2018-10-04 ENCOUNTER — HOSPITAL ENCOUNTER (OUTPATIENT)
Facility: CLINIC | Age: 58
Setting detail: SPECIMEN
Discharge: HOME | End: 2018-10-04
Attending: NURSE PRACTITIONER | Admitting: NURSE PRACTITIONER
Payer: COMMERCIAL

## 2018-10-04 VITALS
HEART RATE: 87 BPM | WEIGHT: 142.9 LBS | TEMPERATURE: 98 F | DIASTOLIC BLOOD PRESSURE: 66 MMHG | SYSTOLIC BLOOD PRESSURE: 105 MMHG | BODY MASS INDEX: 23.78 KG/M2 | OXYGEN SATURATION: 98 % | RESPIRATION RATE: 16 BRPM

## 2018-10-04 DIAGNOSIS — C56.2 OVARIAN CANCER, LEFT (H): ICD-10-CM

## 2018-10-04 DIAGNOSIS — C56.1 OVARIAN CANCER, RIGHT (H): ICD-10-CM

## 2018-10-04 DIAGNOSIS — Z79.899 ENCOUNTER FOR LONG-TERM (CURRENT) USE OF MEDICATIONS: Primary | ICD-10-CM

## 2018-10-04 DIAGNOSIS — D70.2 DRUG-INDUCED NEUTROPENIA (H): ICD-10-CM

## 2018-10-04 LAB
BASOPHILS # BLD AUTO: 0 10E9/L (ref 0–0.2)
BASOPHILS NFR BLD AUTO: 0.6 %
DIFFERENTIAL METHOD BLD: ABNORMAL
EOSINOPHIL # BLD AUTO: 0.2 10E9/L (ref 0–0.7)
EOSINOPHIL NFR BLD AUTO: 3.1 %
ERYTHROCYTE [DISTWIDTH] IN BLOOD BY AUTOMATED COUNT: 14.9 % (ref 10–15)
HCT VFR BLD AUTO: 36.5 % (ref 35–47)
HGB BLD-MCNC: 12.2 G/DL (ref 11.7–15.7)
IMM GRANULOCYTES # BLD: 0.1 10E9/L (ref 0–0.4)
IMM GRANULOCYTES NFR BLD: 0.8 %
LYMPHOCYTES # BLD AUTO: 2.5 10E9/L (ref 0.8–5.3)
LYMPHOCYTES NFR BLD AUTO: 38.5 %
MAGNESIUM SERPL-MCNC: 1.1 MG/DL (ref 1.6–2.3)
MCH RBC QN AUTO: 34.7 PG (ref 26.5–33)
MCHC RBC AUTO-ENTMCNC: 33.4 G/DL (ref 31.5–36.5)
MCV RBC AUTO: 104 FL (ref 78–100)
MONOCYTES # BLD AUTO: 0.6 10E9/L (ref 0–1.3)
MONOCYTES NFR BLD AUTO: 8.8 %
NEUTROPHILS # BLD AUTO: 3.1 10E9/L (ref 1.6–8.3)
NEUTROPHILS NFR BLD AUTO: 48.2 %
NRBC # BLD AUTO: 0 10*3/UL
NRBC BLD AUTO-RTO: 0 /100
PLATELET # BLD AUTO: 291 10E9/L (ref 150–450)
POTASSIUM SERPL-SCNC: 3 MMOL/L (ref 3.4–5.3)
RBC # BLD AUTO: 3.52 10E12/L (ref 3.8–5.2)
WBC # BLD AUTO: 6.5 10E9/L (ref 4–11)

## 2018-10-04 PROCEDURE — 85025 COMPLETE CBC W/AUTO DIFF WBC: CPT | Performed by: NURSE PRACTITIONER

## 2018-10-04 PROCEDURE — 25000132 ZZH RX MED GY IP 250 OP 250 PS 637: Performed by: NURSE PRACTITIONER

## 2018-10-04 PROCEDURE — 84132 ASSAY OF SERUM POTASSIUM: CPT | Performed by: NURSE PRACTITIONER

## 2018-10-04 PROCEDURE — 25000125 ZZHC RX 250: Performed by: NURSE PRACTITIONER

## 2018-10-04 PROCEDURE — 96413 CHEMO IV INFUSION 1 HR: CPT

## 2018-10-04 PROCEDURE — 96375 TX/PRO/DX INJ NEW DRUG ADDON: CPT

## 2018-10-04 PROCEDURE — 83735 ASSAY OF MAGNESIUM: CPT | Performed by: NURSE PRACTITIONER

## 2018-10-04 PROCEDURE — 25000128 H RX IP 250 OP 636: Performed by: NURSE PRACTITIONER

## 2018-10-04 PROCEDURE — 96368 THER/DIAG CONCURRENT INF: CPT

## 2018-10-04 PROCEDURE — 96367 TX/PROPH/DG ADDL SEQ IV INF: CPT

## 2018-10-04 RX ORDER — HEPARIN SODIUM (PORCINE) LOCK FLUSH IV SOLN 100 UNIT/ML 100 UNIT/ML
500 SOLUTION INTRAVENOUS EVERY 8 HOURS
Status: DISCONTINUED | OUTPATIENT
Start: 2018-10-04 | End: 2018-10-04 | Stop reason: HOSPADM

## 2018-10-04 RX ORDER — DIPHENHYDRAMINE HCL 25 MG
25 CAPSULE ORAL ONCE
Status: COMPLETED | OUTPATIENT
Start: 2018-10-04 | End: 2018-10-04

## 2018-10-04 RX ADMIN — FAMOTIDINE 40 MG: 10 INJECTION, SOLUTION INTRAVENOUS at 09:19

## 2018-10-04 RX ADMIN — SODIUM CHLORIDE 250 ML: 9 INJECTION, SOLUTION INTRAVENOUS at 08:58

## 2018-10-04 RX ADMIN — DIPHENHYDRAMINE HYDROCHLORIDE 25 MG: 25 CAPSULE ORAL at 08:59

## 2018-10-04 RX ADMIN — SODIUM CHLORIDE, PRESERVATIVE FREE 500 UNITS: 5 INJECTION INTRAVENOUS at 11:25

## 2018-10-04 RX ADMIN — MAGNESIUM SULFATE HEPTAHYDRATE 40 MEQ: 500 INJECTION, SOLUTION INTRAMUSCULAR; INTRAVENOUS at 09:22

## 2018-10-04 RX ADMIN — DEXAMETHASONE SODIUM PHOSPHATE 12 MG: 10 INJECTION, SOLUTION INTRAMUSCULAR; INTRAVENOUS at 08:58

## 2018-10-04 RX ADMIN — PACLITAXEL 138 MG: 6 INJECTION, SOLUTION INTRAVENOUS at 09:29

## 2018-10-04 NOTE — PROGRESS NOTES
Infusion Nursing Note:  Odalys Nathan presents today for Cycle 2, DAy 15 Taxol, IV Magnesium and Potassium replacement.    Patient seen by provider today: No   present during visit today: Not Applicable.    Note: Patient started on antibiotic last week for possible sinus infection. Patient reports she still has symptoms, but they are improved. Denies headaches. Sinus drainage and cough improved. Patient did have episode over the weekend of chills for 2 days. She didn't check her temperature. Chills have resolved and patient says she's feeling better. Patient instructed to check her temperature next time she feels chills and call clinic if temp >100.4. Patient verbalized understanding.    Patient reports her chronic diarrhea was worse for a few days last week most likely due to antibiotic.     Intravenous Access:  Labs drawn without difficulty.  Implanted Port.    Treatment Conditions:  Lab Results   Component Value Date    HGB 12.2 10/04/2018     Lab Results   Component Value Date    WBC 6.5 10/04/2018      Lab Results   Component Value Date    ANEU 3.1 10/04/2018     Lab Results   Component Value Date     10/04/2018      Results reviewed, labs MET treatment parameters, ok to proceed with treatment.  Potassium 3.0   Magnesium 1.1.      Post Infusion Assessment:  Patient tolerated infusion without incident.  Blood return noted pre and post infusion.  Site patent and intact, free from redness, edema or discomfort.  No evidence of extravasations.  Access discontinued per protocol.    Discharge Plan:   Patient declined prescription refills.  Copy of AVS reviewed with patient and/or family.  Patient will return 10/11 for next appointment.  Patient discharged in stable condition accompanied by: .  Departure Mode: Ambulatory.    Loree Ambriz RN

## 2018-10-04 NOTE — MR AVS SNAPSHOT
After Visit Summary   10/4/2018    Odalys Nathan    MRN: [id number]           Patient Information     Date Of Birth          1960        Visit Information        Provider Department      10/4/2018 8:00 AM RH INFUSION CHAIR 12 [facility]        Today's Diagnoses     Encounter for long-term (current) use of medications    -  1    Ovarian cancer, right (H)        Ovarian cancer, left (H)        Drug-induced neutropenia (H)           Follow-ups after your visit        Your next 10 appointments already scheduled     Oct 11, 2018  8:00 AM CDT   Level 3 with RH INFUSION CHAIR 4   [facility] ([facility])    [facility]  [address]  [place] [address]   [phone]            Oct 17, 2018  8:00 AM CDT   Level 3 with RH INFUSION CHAIR 7   [facility] ([facility])    [facility]  [address]  [place] [address]   [phone]            Oct 23, 2018  8:00 AM CDT   Level 3 with RH INFUSION CHAIR 6   [facility] ([facility])    [facility]  [address]  [facility] [address]   [phone]            Nov 08, 2018  7:45 AM CST   Lab with Infusion with RH LAB DRAW 1   [facility] ([facility])    [facility]  [address]  [place] [address]   [phone]            Nov 08, 2018  8:00 AM CST   Return Visit with HAILE De Paz [facility] ([facility])    [facility]  [address]  [facility] [address]   [phone]            Nov 08, 2018  9:00 AM CST   Level 2 with RH INFUSION CHAIR 12   [facility]  ([facility])    [facility]  [address]  [facility] [id number] [phone]              Who to contact     If you have questions or need follow up information about today's clinic visit or your schedule please contact [facility] INFUSION SERVICES directly at [phone].  Normal or non-critical lab and imaging results will be communicated to you by MyChart, letter or phone within 4 business days after the clinic has received the results. If you do not hear from us within 7 days, please contact the clinic through [employer] or phone. If you have a critical or abnormal lab result, we will notify you by phone as soon as possible.  Submit refill requests through [employer] or call your pharmacy and they will forward the refill request to us. Please allow 3 business days for your refill to be completed.          Additional Information About Your Visit        [employer]ordeo Information     [employer] gives you secure access to your electronic health record. If you see a primary care provider, you can also send messages to your care team and make appointments. If you have questions, please call your primary care clinic.  If you do not have a primary care provider, please call [phone] and they will assist you.        Care EveryWhere ID     This is your Care EveryWhere ID. This could be used by other organizations to access your [place] medical records  [id number]        Your Vitals Were     Pulse Temperature Respirations Pulse Oximetry BMI (Body Mass Index)       87 98  F (36.7  C) (Oral) 16 98% 23.78 kg/m2        Blood Pressure from Last 3 Encounters:   10/04/18 105/66   09/27/18 117/78   09/20/18 108/68    Weight from Last 3 Encounters:   10/04/18 64.8 kg (142 lb 14.4 oz)   09/27/18 64.6 kg (142 lb 6.4 oz)   09/20/18 64.9 kg (143 lb)              We Performed the Following     CBC with platelets differential     Magnesium     Potassium        Primary Care  Provider Office Phone # Fax #    Aubrie Hester [phone]       [facility] [address] [facility] [address]        Equal Access to Services     SANDY CHAMBERS : Felicia santana syedao Somanishali, waaxda luqadaha, qaybta kaalmada adedileep, blanka jaquezmarquise szymanski. So [facility] [phone].    ATENCIÓN: Si habla español, tiene a bell disposición servicios gratuitos de asistencia lingüística. Ll[facility] [phone].    We comply with applicable federal civil rights laws and [place] laws. We do not discriminate on the basis of race, color, national origin, age, disability, sex, sexual orientation, or gender identity.            Thank you!     Thank you for choosing [facility] INFUSION SERVICES  for your care. Our goal is always to provide you with excellent care. Hearing back from our patients is one way we can continue to improve our services. Please take a few minutes to complete the written survey that you may receive in the mail after your visit with us. Thank you!             Your Updated Medication List - Protect others around you: Learn how to safely use, store and throw away your medicines at www.disposemymeds.org.          This list is accurate as of 10/4/18 10:24 AM.  Always use your most recent med list.                   Brand Name Dispense Instructions for use Diagnosis    amoxicillin-clavulanate 875-125 MG per tablet    AUGMENTIN    20 tablet    Take 1 tablet by mouth 2 times daily    Bacterial sinusitis       CALCIUM + D PO      Take 1 tablet by mouth daily.    Pelvic mass       cyanocobalamin 1000 MCG/ML injection    VITAMIN B12    1 mL    Inject 1 mL (1,000 mcg) into the muscle every 30 days    B12 deficiency       diphenoxylate-atropine 2.5-0.025 MG per tablet    LOMOTIL    60 tablet    Take 2 tablets by mouth 4 times daily as needed for diarrhea    Acute diarrhea       Ferrous Sulfate 324 (65 Fe) MG Tbec     90 tablet    Take 1  tablet by mouth 3 times daily (with meals). Take with a small amount of orange juice. Do not take with calcium.    Ovarian cancer, right (H), Anemia, unspecified type, Ovarian cancer, left (H)       HERBALS      daily        * iohexol 140 MG/ML Soln solution    OMNIPAQUE    140 mL    Mix entire bottle (50ml) of contast with 600ml (20 ounces) of water and drink half 2 hrs prior to CT scan and half 1 hr prior to scan    Ovarian cancer, right (H), Metastatic cancer (H)       * iohexol 140 MG/ML Soln solution    OMNIPAQUE    50 mL    Mix entire bottle (50ml) of contast with 600ml (20 ounces) of water and drink half 2 hrs prior to CT scan and half 1 hr prior to scan    Personal history of ovarian cancer       LEVOTHYROXINE SODIUM PO      Take by mouth daily        loperamide 2 MG capsule    IMODIUM     Take 2 mg by mouth 4 times daily as needed for diarrhea        * LORazepam 1 MG tablet    ATIVAN    30 tablet    Take 1 tablet (1 mg) by mouth every 6 hours as needed (Anxiety, Nausea/Vomiting or Sleep)    Ovarian cancer, unspecified laterality (H)       * LORazepam 1 MG tablet    ATIVAN    30 tablet    Take 1 tablet (1 mg) by mouth every 6 hours as needed (Anxiety, Nausea/Vomiting or Sleep)    Encounter for long-term (current) use of medications, Ovarian cancer, right (H), Ovarian cancer, left (H)       magnesium oxide 400 MG tablet    MAG-OX    90 tablet    Take 1 tablet (400 mg) by mouth 2 times daily    Ovarian cancer, unspecified laterality (H)       omeprazole 20 MG CR capsule    priLOSEC    30 capsule    Take 1 capsule (20 mg) by mouth daily    Dyspepsia       order for DME     3 each    Injection Supplies for Vitamin B12: 3cc syringes w/ 27 gauge needles, 1/2 inch length    B12 deficiency       potassium chloride 20 MEQ Packet    KLOR-CON     Take 20 mEq by mouth daily        * prochlorperazine 10 MG tablet    COMPAZINE    30 tablet    Take 1 tablet (10 mg) by mouth every 6 hours as needed (nausea/vomiting)     Encounter for long-term (current) use of medications, Ovarian cancer, right (H), Ovarian cancer, left (H), Drug-induced neutropenia (H)       * prochlorperazine 10 MG tablet    COMPAZINE    30 tablet    Take 1 tablet (10 mg) by mouth every 6 hours as needed (Nausea/Vomiting)    Encounter for long-term (current) use of medications, Ovarian cancer, right (H), Ovarian cancer, left (H), Drug-induced neutropenia (H)       VITAMIN C PO      Take 500 mg by mouth daily    Pelvic mass       VITAMIN E NATURAL PO      Take 100 Units by mouth daily        * Notice:  This list has 6 medication(s) that are the same as other medications prescribed for you. Read the directions carefully, and ask your doctor or other care provider to review them with you.

## 2018-10-11 ENCOUNTER — INFUSION THERAPY VISIT (OUTPATIENT)
Dept: INFUSION THERAPY | Facility: CLINIC | Age: 58
End: 2018-10-11
Attending: NURSE PRACTITIONER
Payer: COMMERCIAL

## 2018-10-11 ENCOUNTER — HOSPITAL ENCOUNTER (OUTPATIENT)
Facility: CLINIC | Age: 58
Setting detail: SPECIMEN
Discharge: HOME | End: 2018-10-11
Attending: NURSE PRACTITIONER | Admitting: NURSE PRACTITIONER
Payer: COMMERCIAL

## 2018-10-11 VITALS
WEIGHT: 144.2 LBS | OXYGEN SATURATION: 98 % | DIASTOLIC BLOOD PRESSURE: 73 MMHG | RESPIRATION RATE: 16 BRPM | TEMPERATURE: 97.9 F | SYSTOLIC BLOOD PRESSURE: 113 MMHG | HEART RATE: 83 BPM | BODY MASS INDEX: 24 KG/M2

## 2018-10-11 DIAGNOSIS — Z79.899 ENCOUNTER FOR LONG-TERM (CURRENT) USE OF MEDICATIONS: Primary | ICD-10-CM

## 2018-10-11 DIAGNOSIS — C56.1 OVARIAN CANCER, RIGHT (H): ICD-10-CM

## 2018-10-11 DIAGNOSIS — R79.89 ELEVATED LFTS: ICD-10-CM

## 2018-10-11 DIAGNOSIS — C56.2 OVARIAN CANCER, LEFT (H): ICD-10-CM

## 2018-10-11 DIAGNOSIS — D70.2 DRUG-INDUCED NEUTROPENIA (H): ICD-10-CM

## 2018-10-11 LAB
ALBUMIN SERPL-MCNC: 3.5 G/DL (ref 3.4–5)
ALP SERPL-CCNC: 184 U/L (ref 40–150)
ALT SERPL W P-5'-P-CCNC: 38 U/L (ref 0–50)
ANION GAP SERPL CALCULATED.3IONS-SCNC: 9 MMOL/L (ref 3–14)
AST SERPL W P-5'-P-CCNC: 22 U/L (ref 0–45)
BASOPHILS # BLD AUTO: 0.1 10E9/L (ref 0–0.2)
BASOPHILS NFR BLD AUTO: 0.8 %
BILIRUB SERPL-MCNC: 0.4 MG/DL (ref 0.2–1.3)
BUN SERPL-MCNC: 12 MG/DL (ref 7–30)
CALCIUM SERPL-MCNC: 8.8 MG/DL (ref 8.5–10.1)
CHLORIDE SERPL-SCNC: 102 MMOL/L (ref 94–109)
CO2 SERPL-SCNC: 26 MMOL/L (ref 20–32)
CREAT SERPL-MCNC: 0.71 MG/DL (ref 0.52–1.04)
DIFFERENTIAL METHOD BLD: ABNORMAL
EOSINOPHIL # BLD AUTO: 0.3 10E9/L (ref 0–0.7)
EOSINOPHIL NFR BLD AUTO: 4.6 %
ERYTHROCYTE [DISTWIDTH] IN BLOOD BY AUTOMATED COUNT: 15.2 % (ref 10–15)
GFR SERPL CREATININE-BSD FRML MDRD: 84 ML/MIN/1.7M2
GLUCOSE SERPL-MCNC: 102 MG/DL (ref 70–99)
HCT VFR BLD AUTO: 36 % (ref 35–47)
HGB BLD-MCNC: 11.9 G/DL (ref 11.7–15.7)
IMM GRANULOCYTES # BLD: 0.1 10E9/L (ref 0–0.4)
IMM GRANULOCYTES NFR BLD: 1.5 %
LYMPHOCYTES # BLD AUTO: 2.5 10E9/L (ref 0.8–5.3)
LYMPHOCYTES NFR BLD AUTO: 38.3 %
MAGNESIUM SERPL-MCNC: 1.1 MG/DL (ref 1.6–2.3)
MCH RBC QN AUTO: 35.2 PG (ref 26.5–33)
MCHC RBC AUTO-ENTMCNC: 33.1 G/DL (ref 31.5–36.5)
MCV RBC AUTO: 107 FL (ref 78–100)
MONOCYTES # BLD AUTO: 0.5 10E9/L (ref 0–1.3)
MONOCYTES NFR BLD AUTO: 8.2 %
NEUTROPHILS # BLD AUTO: 3.1 10E9/L (ref 1.6–8.3)
NEUTROPHILS NFR BLD AUTO: 46.6 %
NRBC # BLD AUTO: 0 10*3/UL
NRBC BLD AUTO-RTO: 0 /100
PLATELET # BLD AUTO: 343 10E9/L (ref 150–450)
POTASSIUM SERPL-SCNC: 3.8 MMOL/L (ref 3.4–5.3)
PROT SERPL-MCNC: 6.9 G/DL (ref 6.8–8.8)
RBC # BLD AUTO: 3.38 10E12/L (ref 3.8–5.2)
SODIUM SERPL-SCNC: 137 MMOL/L (ref 133–144)
WBC # BLD AUTO: 6.6 10E9/L (ref 4–11)

## 2018-10-11 PROCEDURE — 96375 TX/PRO/DX INJ NEW DRUG ADDON: CPT

## 2018-10-11 PROCEDURE — 25000125 ZZHC RX 250: Performed by: NURSE PRACTITIONER

## 2018-10-11 PROCEDURE — 96413 CHEMO IV INFUSION 1 HR: CPT

## 2018-10-11 PROCEDURE — 96367 TX/PROPH/DG ADDL SEQ IV INF: CPT

## 2018-10-11 PROCEDURE — 80053 COMPREHEN METABOLIC PANEL: CPT | Performed by: NURSE PRACTITIONER

## 2018-10-11 PROCEDURE — 25000132 ZZH RX MED GY IP 250 OP 250 PS 637: Performed by: NURSE PRACTITIONER

## 2018-10-11 PROCEDURE — 25000128 H RX IP 250 OP 636: Performed by: NURSE PRACTITIONER

## 2018-10-11 PROCEDURE — 83735 ASSAY OF MAGNESIUM: CPT | Performed by: NURSE PRACTITIONER

## 2018-10-11 PROCEDURE — 85025 COMPLETE CBC W/AUTO DIFF WBC: CPT | Performed by: NURSE PRACTITIONER

## 2018-10-11 RX ORDER — DIPHENHYDRAMINE HCL 25 MG
25 CAPSULE ORAL ONCE
Status: COMPLETED | OUTPATIENT
Start: 2018-10-11 | End: 2018-10-11

## 2018-10-11 RX ORDER — HEPARIN SODIUM (PORCINE) LOCK FLUSH IV SOLN 100 UNIT/ML 100 UNIT/ML
500 SOLUTION INTRAVENOUS EVERY 8 HOURS
Status: DISCONTINUED | OUTPATIENT
Start: 2018-10-11 | End: 2018-10-11 | Stop reason: HOSPADM

## 2018-10-11 RX ADMIN — DIPHENHYDRAMINE HYDROCHLORIDE 25 MG: 25 CAPSULE ORAL at 08:40

## 2018-10-11 RX ADMIN — DEXAMETHASONE SODIUM PHOSPHATE 12 MG: 10 INJECTION, SOLUTION INTRAMUSCULAR; INTRAVENOUS at 08:45

## 2018-10-11 RX ADMIN — MAGNESIUM SULFATE HEPTAHYDRATE 4 G: 500 INJECTION, SOLUTION INTRAMUSCULAR; INTRAVENOUS at 09:24

## 2018-10-11 RX ADMIN — PACLITAXEL 138 MG: 6 INJECTION, SOLUTION INTRAVENOUS at 09:09

## 2018-10-11 RX ADMIN — FAMOTIDINE 40 MG: 10 INJECTION INTRAVENOUS at 08:45

## 2018-10-11 RX ADMIN — SODIUM CHLORIDE, PRESERVATIVE FREE 500 UNITS: 5 INJECTION INTRAVENOUS at 10:15

## 2018-10-11 RX ADMIN — SODIUM CHLORIDE 250 ML: 9 INJECTION, SOLUTION INTRAVENOUS at 08:40

## 2018-10-11 NOTE — MR AVS SNAPSHOT
After Visit Summary   10/11/2018    Odalys Nathan    MRN: [id number]           Patient Information     Date Of Birth          1960        Visit Information        Provider Department      10/11/2018 8:00 AM RH INFUSION CHAIR 4 [facility]        Today's Diagnoses     Encounter for long-term (current) use of medications    -  1    Ovarian cancer, right (H)        Ovarian cancer, left (H)        Drug-induced neutropenia (H)        Elevated LFTs           Follow-ups after your visit        Your next 10 appointments already scheduled     Oct 17, 2018  8:00 AM CDT   Level 3 with RH INFUSION CHAIR 7   [facility] ([facility])    [facility]  [address]  [place] [address]   [phone]            Oct 23, 2018  8:00 AM CDT   Level 3 with RH INFUSION CHAIR 6   [facility] ([facility])    [facility]  [address]  [place] [address]   [phone]            Nov 08, 2018  7:45 AM CST   Lab with Infusion with RH LAB DRAW 1   [facility] ([facility])    [facility]  [address]  [place] [address]   [phone]            Nov 08, 2018  8:00 AM CST   Return Visit with HAILE De Paz [facility] ([facility])    [facility]  [address]  [place] [address]   [phone]            Nov 08, 2018  9:00 AM CST   Level 2 with RH INFUSION CHAIR 12   [facility] ([facility])    [facility]  [address]  [place] [address]   [phone]              Who to contact     If you have questions or need follow up information about today's  clinic visit or your schedule please contact [facility] directly at [phone].  Normal or non-critical lab and imaging results will be communicated to you by MyChart, letter or phone within 4 business days after the clinic has received the results. If you do not hear from us within 7 days, please contact the clinic through RealityMinehart or phone. If you have a critical or abnormal lab result, we will notify you by phone as soon as possible.  Submit refill requests through MOBi-LEARN or call your pharmacy and they will forward the refill request to us. Please allow 3 business days for your refill to be completed.          Additional Information About Your Visit        RealityMineharviaForensics Information     MOBi-LEARN gives you secure access to your electronic health record. If you see a primary care provider, you can also send messages to your care team and make appointments. If you have questions, please call your primary care clinic.  If you do not have a primary care provider, please call [phone] and they will assist you.        Care EveryWhere ID     This is your Care EveryWhere ID. This could be used by other organizations to access your [place] medical records  [id number]        Your Vitals Were     Pulse Temperature Respirations Pulse Oximetry BMI (Body Mass Index)       83 97.9  F (36.6  C) (Tympanic) 16 98% 24 kg/m2        Blood Pressure from Last 3 Encounters:   10/11/18 113/73   10/04/18 105/66   09/27/18 117/78    Weight from Last 3 Encounters:   10/11/18 65.4 kg (144 lb 3.2 oz)   10/04/18 64.8 kg (142 lb 14.4 oz)   09/27/18 64.6 kg (142 lb 6.4 oz)              We Performed the Following     CBC with platelets differential     Comprehensive metabolic panel     Magnesium        Primary Care Provider Office Phone # Fax #    Aubrie Hester [phone]       [facility] [address]  [facility] [address]        Equal Access to Services     SANDY  GAAR : Hadii aad ku naima Walter, waaxda luqadaha, qaybta kagianluca staples, blanka roselyn renanbertha esquivelmotoshia beckman . So [facility] [phone].    ATENCIÓN: Si luísla von, tiene a bell disposición servicios gratuitos de asistencia lingüística. Llame al [phone].    We comply with applicable federal civil rights laws and [place] laws. We do not discriminate on the basis of race, color, national origin, age, disability, sex, sexual orientation, or gender identity.            Thank you!     Thank you for choosing [facility]  for your care. Our goal is always to provide you with excellent care. Hearing back from our patients is one way we can continue to improve our services. Please take a few minutes to complete the written survey that you may receive in the mail after your visit with us. Thank you!             Your Updated Medication List - Protect others around you: Learn how to safely use, store and throw away your medicines at www.disposemymeds.org.          This list is accurate as of 10/11/18  4:11 PM.  Always use your most recent med list.                   Brand Name Dispense Instructions for use Diagnosis    amoxicillin-clavulanate 875-125 MG per tablet    AUGMENTIN    20 tablet    Take 1 tablet by mouth 2 times daily    Bacterial sinusitis       CALCIUM + D PO      Take 1 tablet by mouth daily.    Pelvic mass       cyanocobalamin 1000 MCG/ML injection    VITAMIN B12    1 mL    Inject 1 mL (1,000 mcg) into the muscle every 30 days    B12 deficiency       diphenoxylate-atropine 2.5-0.025 MG per tablet    LOMOTIL    60 tablet    Take 2 tablets by mouth 4 times daily as needed for diarrhea    Acute diarrhea       Ferrous Sulfate 324 (65 Fe) MG Tbec     90 tablet    Take 1 tablet by mouth 3 times daily (with meals). Take with a small amount of orange juice. Do not take with calcium.    Ovarian cancer, right (H), Anemia, unspecified type, Ovarian cancer, left (H)        HERBALS      daily        * iohexol 140 MG/ML Soln solution    OMNIPAQUE    140 mL    Mix entire bottle (50ml) of contast with 600ml (20 ounces) of water and drink half 2 hrs prior to CT scan and half 1 hr prior to scan    Ovarian cancer, right (H), Metastatic cancer (H)       * iohexol 140 MG/ML Soln solution    OMNIPAQUE    50 mL    Mix entire bottle (50ml) of contast with 600ml (20 ounces) of water and drink half 2 hrs prior to CT scan and half 1 hr prior to scan    Personal history of ovarian cancer       LEVOTHYROXINE SODIUM PO      Take by mouth daily        loperamide 2 MG capsule    IMODIUM     Take 2 mg by mouth 4 times daily as needed for diarrhea        * LORazepam 1 MG tablet    ATIVAN    30 tablet    Take 1 tablet (1 mg) by mouth every 6 hours as needed (Anxiety, Nausea/Vomiting or Sleep)    Ovarian cancer, unspecified laterality (H)       * LORazepam 1 MG tablet    ATIVAN    30 tablet    Take 1 tablet (1 mg) by mouth every 6 hours as needed (Anxiety, Nausea/Vomiting or Sleep)    Encounter for long-term (current) use of medications, Ovarian cancer, right (H), Ovarian cancer, left (H)       magnesium oxide 400 MG tablet    MAG-OX    90 tablet    Take 1 tablet (400 mg) by mouth 2 times daily    Ovarian cancer, unspecified laterality (H)       omeprazole 20 MG CR capsule    priLOSEC    30 capsule    Take 1 capsule (20 mg) by mouth daily    Dyspepsia       order for DME     3 each    Injection Supplies for Vitamin B12: 3cc syringes w/ 27 gauge needles, 1/2 inch length    B12 deficiency       potassium chloride 20 MEQ Packet    KLOR-CON     Take 20 mEq by mouth daily        * prochlorperazine 10 MG tablet    COMPAZINE    30 tablet    Take 1 tablet (10 mg) by mouth every 6 hours as needed (nausea/vomiting)    Encounter for long-term (current) use of medications, Ovarian cancer, right (H), Ovarian cancer, left (H), Drug-induced neutropenia (H)       * prochlorperazine 10 MG tablet    COMPAZINE    30 tablet     Take 1 tablet (10 mg) by mouth every 6 hours as needed (Nausea/Vomiting)    Encounter for long-term (current) use of medications, Ovarian cancer, right (H), Ovarian cancer, left (H), Drug-induced neutropenia (H)       VITAMIN C PO      Take 500 mg by mouth daily    Pelvic mass       VITAMIN E NATURAL PO      Take 100 Units by mouth daily        * Notice:  This list has 6 medication(s) that are the same as other medications prescribed for you. Read the directions carefully, and ask your doctor or other care provider to review them with you.

## 2018-10-11 NOTE — PROGRESS NOTES
Infusion Nursing Note:  Odalys Nathan presents today for taxol and mag replacement  Patient seen by provider today: No   present during visit today: Not Applicable.    Note: N/A.    Intravenous Access:  Lab draw site R chest, Needle type michele, Gauge 20.  Labs drawn without difficulty.  Implanted Port.    Treatment Conditions:  Lab Results   Component Value Date    HGB 11.9 10/11/2018     Lab Results   Component Value Date    WBC 6.6 10/11/2018      Lab Results   Component Value Date    ANEU 3.1 10/11/2018     Lab Results   Component Value Date     10/11/2018      Lab Results   Component Value Date     10/11/2018                   Lab Results   Component Value Date    POTASSIUM 3.8 10/11/2018           Lab Results   Component Value Date    MAG 1.1 10/11/2018            Lab Results   Component Value Date    CR 0.71 10/11/2018                   Lab Results   Component Value Date    JOSE ALBERTO 8.8 10/11/2018                Lab Results   Component Value Date    BILITOTAL 0.4 10/11/2018           Lab Results   Component Value Date    ALBUMIN 3.5 10/11/2018                    Lab Results   Component Value Date    ALT 38 10/11/2018           Lab Results   Component Value Date    AST 22 10/11/2018       Results reviewed, labs MET treatment parameters, ok to proceed with treatment.      Post Infusion Assessment:  Patient tolerated infusion without incident.  Blood return noted pre and post infusion.  Site patent and intact, free from redness, edema or discomfort.  No evidence of extravasations.  Access discontinued per protocol.    Discharge Plan:   Discharge instructions reviewed with: Patient and Family.  AVS to patient via TaxJarT.  Patient will return 10/17 for next appointment.   Patient discharged in stable condition accompanied by: self and .  Departure Mode: Ambulatory.    Keisha Ferguson RN

## 2018-10-17 ENCOUNTER — INFUSION THERAPY VISIT (OUTPATIENT)
Dept: INFUSION THERAPY | Facility: CLINIC | Age: 58
End: 2018-10-17
Attending: NURSE PRACTITIONER
Payer: COMMERCIAL

## 2018-10-17 ENCOUNTER — HOSPITAL ENCOUNTER (OUTPATIENT)
Facility: CLINIC | Age: 58
Setting detail: SPECIMEN
Discharge: HOME | End: 2018-10-17
Attending: NURSE PRACTITIONER | Admitting: NURSE PRACTITIONER
Payer: COMMERCIAL

## 2018-10-17 VITALS
BODY MASS INDEX: 23.68 KG/M2 | RESPIRATION RATE: 16 BRPM | WEIGHT: 142.3 LBS | SYSTOLIC BLOOD PRESSURE: 103 MMHG | OXYGEN SATURATION: 98 % | TEMPERATURE: 98.2 F | HEART RATE: 82 BPM | DIASTOLIC BLOOD PRESSURE: 69 MMHG

## 2018-10-17 DIAGNOSIS — C56.1 OVARIAN CANCER, RIGHT (H): ICD-10-CM

## 2018-10-17 DIAGNOSIS — C56.2 OVARIAN CANCER, LEFT (H): ICD-10-CM

## 2018-10-17 DIAGNOSIS — D70.2 DRUG-INDUCED NEUTROPENIA (H): ICD-10-CM

## 2018-10-17 DIAGNOSIS — Z79.899 ENCOUNTER FOR LONG-TERM (CURRENT) USE OF MEDICATIONS: Primary | ICD-10-CM

## 2018-10-17 LAB
BASOPHILS # BLD AUTO: 0 10E9/L (ref 0–0.2)
BASOPHILS NFR BLD AUTO: 0.7 %
DIFFERENTIAL METHOD BLD: ABNORMAL
EOSINOPHIL # BLD AUTO: 0.2 10E9/L (ref 0–0.7)
EOSINOPHIL NFR BLD AUTO: 3.5 %
ERYTHROCYTE [DISTWIDTH] IN BLOOD BY AUTOMATED COUNT: 15 % (ref 10–15)
HCT VFR BLD AUTO: 34.9 % (ref 35–47)
HGB BLD-MCNC: 11.5 G/DL (ref 11.7–15.7)
IMM GRANULOCYTES # BLD: 0.1 10E9/L (ref 0–0.4)
IMM GRANULOCYTES NFR BLD: 0.9 %
LYMPHOCYTES # BLD AUTO: 2 10E9/L (ref 0.8–5.3)
LYMPHOCYTES NFR BLD AUTO: 35.2 %
MAGNESIUM SERPL-MCNC: 1 MG/DL (ref 1.6–2.3)
MCH RBC QN AUTO: 35.3 PG (ref 26.5–33)
MCHC RBC AUTO-ENTMCNC: 33 G/DL (ref 31.5–36.5)
MCV RBC AUTO: 107 FL (ref 78–100)
MONOCYTES # BLD AUTO: 0.5 10E9/L (ref 0–1.3)
MONOCYTES NFR BLD AUTO: 9.2 %
NEUTROPHILS # BLD AUTO: 2.9 10E9/L (ref 1.6–8.3)
NEUTROPHILS NFR BLD AUTO: 50.5 %
NRBC # BLD AUTO: 0 10*3/UL
NRBC BLD AUTO-RTO: 0 /100
PLATELET # BLD AUTO: 300 10E9/L (ref 150–450)
POTASSIUM SERPL-SCNC: 3.6 MMOL/L (ref 3.4–5.3)
RBC # BLD AUTO: 3.26 10E12/L (ref 3.8–5.2)
WBC # BLD AUTO: 5.8 10E9/L (ref 4–11)

## 2018-10-17 PROCEDURE — 25000128 H RX IP 250 OP 636: Performed by: NURSE PRACTITIONER

## 2018-10-17 PROCEDURE — 96367 TX/PROPH/DG ADDL SEQ IV INF: CPT

## 2018-10-17 PROCEDURE — 85025 COMPLETE CBC W/AUTO DIFF WBC: CPT | Performed by: NURSE PRACTITIONER

## 2018-10-17 PROCEDURE — 84132 ASSAY OF SERUM POTASSIUM: CPT | Performed by: NURSE PRACTITIONER

## 2018-10-17 PROCEDURE — 83735 ASSAY OF MAGNESIUM: CPT | Performed by: NURSE PRACTITIONER

## 2018-10-17 PROCEDURE — 25000132 ZZH RX MED GY IP 250 OP 250 PS 637: Performed by: NURSE PRACTITIONER

## 2018-10-17 PROCEDURE — 96375 TX/PRO/DX INJ NEW DRUG ADDON: CPT

## 2018-10-17 PROCEDURE — 25000125 ZZHC RX 250: Performed by: NURSE PRACTITIONER

## 2018-10-17 PROCEDURE — 96413 CHEMO IV INFUSION 1 HR: CPT

## 2018-10-17 RX ORDER — HEPARIN SODIUM (PORCINE) LOCK FLUSH IV SOLN 100 UNIT/ML 100 UNIT/ML
500 SOLUTION INTRAVENOUS EVERY 8 HOURS
Status: DISCONTINUED | OUTPATIENT
Start: 2018-10-17 | End: 2018-10-17 | Stop reason: HOSPADM

## 2018-10-17 RX ORDER — DIPHENHYDRAMINE HCL 25 MG
25 CAPSULE ORAL ONCE
Status: COMPLETED | OUTPATIENT
Start: 2018-10-17 | End: 2018-10-17

## 2018-10-17 RX ADMIN — DEXAMETHASONE SODIUM PHOSPHATE 12 MG: 10 INJECTION, SOLUTION INTRAMUSCULAR; INTRAVENOUS at 08:54

## 2018-10-17 RX ADMIN — DIPHENHYDRAMINE HYDROCHLORIDE 25 MG: 25 CAPSULE ORAL at 08:53

## 2018-10-17 RX ADMIN — SODIUM CHLORIDE, PRESERVATIVE FREE 500 UNITS: 5 INJECTION INTRAVENOUS at 12:15

## 2018-10-17 RX ADMIN — SODIUM CHLORIDE 250 ML: 9 INJECTION, SOLUTION INTRAVENOUS at 08:59

## 2018-10-17 RX ADMIN — FAMOTIDINE 40 MG: 10 INJECTION INTRAVENOUS at 08:54

## 2018-10-17 RX ADMIN — PACLITAXEL 138 MG: 6 INJECTION, SOLUTION INTRAVENOUS at 09:16

## 2018-10-17 RX ADMIN — MAGNESIUM SULFATE HEPTAHYDRATE 4 G: 500 INJECTION, SOLUTION INTRAMUSCULAR; INTRAVENOUS at 09:54

## 2018-10-17 NOTE — PROGRESS NOTES
Infusion Nursing Note:  Odalys Nathan presents today for Taxol, mag replacement.    Patient seen by provider today: No   present during visit today: Not Applicable.    Note: N/A.    Intravenous Access:  Lab draw site R chest, Needle type michele, Gauge 20.  Labs drawn without difficulty.  Implanted Port.    Treatment Conditions:  Lab Results   Component Value Date    HGB 11.5 10/17/2018     Lab Results   Component Value Date    WBC 5.8 10/17/2018      Lab Results   Component Value Date    ANEU 2.9 10/17/2018     Lab Results   Component Value Date     10/17/2018      Results reviewed, labs MET treatment parameters, ok to proceed with treatment.  K = 3.6  Mag = 1.0  4 Gms replacemenet given    Post Infusion Assessment:  Patient tolerated infusion without incident.  Blood return noted pre and post infusion.  Site patent and intact, free from redness, edema or discomfort.  No evidence of extravasations.  Access discontinued per protocol.    Discharge Plan:   Patient declined prescription refills.  Patient and/or family verbalized understanding of discharge instructions and all questions answered.  AVS to patient via MysteryD.  Patient will return 10/23 for next appointment.   Patient discharged in stable condition accompanied by: self.  Departure Mode: Ambulatory.    Keisha Ferguson RN

## 2018-10-23 ENCOUNTER — INFUSION THERAPY VISIT (OUTPATIENT)
Dept: INFUSION THERAPY | Facility: CLINIC | Age: 58
End: 2018-10-23
Attending: NURSE PRACTITIONER
Payer: COMMERCIAL

## 2018-10-23 ENCOUNTER — HOSPITAL ENCOUNTER (OUTPATIENT)
Facility: CLINIC | Age: 58
Setting detail: SPECIMEN
Discharge: HOME | End: 2018-10-23
Attending: NURSE PRACTITIONER | Admitting: NURSE PRACTITIONER
Payer: COMMERCIAL

## 2018-10-23 VITALS
SYSTOLIC BLOOD PRESSURE: 121 MMHG | HEART RATE: 90 BPM | OXYGEN SATURATION: 100 % | DIASTOLIC BLOOD PRESSURE: 77 MMHG | BODY MASS INDEX: 23.73 KG/M2 | TEMPERATURE: 97.8 F | WEIGHT: 142.6 LBS | RESPIRATION RATE: 16 BRPM

## 2018-10-23 DIAGNOSIS — D70.2 DRUG-INDUCED NEUTROPENIA (H): ICD-10-CM

## 2018-10-23 DIAGNOSIS — C56.2 OVARIAN CANCER, LEFT (H): ICD-10-CM

## 2018-10-23 DIAGNOSIS — Z79.899 ENCOUNTER FOR LONG-TERM (CURRENT) USE OF MEDICATIONS: Primary | ICD-10-CM

## 2018-10-23 DIAGNOSIS — C56.1 OVARIAN CANCER, RIGHT (H): ICD-10-CM

## 2018-10-23 LAB
BASOPHILS # BLD AUTO: 0.1 10E9/L (ref 0–0.2)
BASOPHILS NFR BLD AUTO: 0.8 %
DIFFERENTIAL METHOD BLD: ABNORMAL
EOSINOPHIL # BLD AUTO: 0.2 10E9/L (ref 0–0.7)
EOSINOPHIL NFR BLD AUTO: 2.5 %
ERYTHROCYTE [DISTWIDTH] IN BLOOD BY AUTOMATED COUNT: 15 % (ref 10–15)
HCT VFR BLD AUTO: 35.8 % (ref 35–47)
HGB BLD-MCNC: 12 G/DL (ref 11.7–15.7)
IMM GRANULOCYTES # BLD: 0.1 10E9/L (ref 0–0.4)
IMM GRANULOCYTES NFR BLD: 1.4 %
LYMPHOCYTES # BLD AUTO: 2.5 10E9/L (ref 0.8–5.3)
LYMPHOCYTES NFR BLD AUTO: 39.5 %
MAGNESIUM SERPL-MCNC: 0.9 MG/DL (ref 1.6–2.3)
MCH RBC QN AUTO: 35.4 PG (ref 26.5–33)
MCHC RBC AUTO-ENTMCNC: 33.5 G/DL (ref 31.5–36.5)
MCV RBC AUTO: 106 FL (ref 78–100)
MONOCYTES # BLD AUTO: 0.4 10E9/L (ref 0–1.3)
MONOCYTES NFR BLD AUTO: 6.8 %
NEUTROPHILS # BLD AUTO: 3.1 10E9/L (ref 1.6–8.3)
NEUTROPHILS NFR BLD AUTO: 49 %
NRBC # BLD AUTO: 0 10*3/UL
NRBC BLD AUTO-RTO: 0 /100
PLATELET # BLD AUTO: 304 10E9/L (ref 150–450)
POTASSIUM SERPL-SCNC: 3.1 MMOL/L (ref 3.4–5.3)
RBC # BLD AUTO: 3.39 10E12/L (ref 3.8–5.2)
WBC # BLD AUTO: 6.3 10E9/L (ref 4–11)

## 2018-10-23 PROCEDURE — 85025 COMPLETE CBC W/AUTO DIFF WBC: CPT | Performed by: NURSE PRACTITIONER

## 2018-10-23 PROCEDURE — 83735 ASSAY OF MAGNESIUM: CPT | Performed by: NURSE PRACTITIONER

## 2018-10-23 PROCEDURE — 25000125 ZZHC RX 250: Performed by: NURSE PRACTITIONER

## 2018-10-23 PROCEDURE — 96375 TX/PRO/DX INJ NEW DRUG ADDON: CPT

## 2018-10-23 PROCEDURE — 25000132 ZZH RX MED GY IP 250 OP 250 PS 637: Performed by: NURSE PRACTITIONER

## 2018-10-23 PROCEDURE — 25000128 H RX IP 250 OP 636: Performed by: NURSE PRACTITIONER

## 2018-10-23 PROCEDURE — 84132 ASSAY OF SERUM POTASSIUM: CPT | Performed by: NURSE PRACTITIONER

## 2018-10-23 PROCEDURE — 96413 CHEMO IV INFUSION 1 HR: CPT

## 2018-10-23 RX ORDER — POTASSIUM CHLORIDE 1500 MG/1
40 TABLET, EXTENDED RELEASE ORAL ONCE
Status: COMPLETED | OUTPATIENT
Start: 2018-10-23 | End: 2018-10-23

## 2018-10-23 RX ORDER — HEPARIN SODIUM (PORCINE) LOCK FLUSH IV SOLN 100 UNIT/ML 100 UNIT/ML
500 SOLUTION INTRAVENOUS EVERY 8 HOURS
Status: DISCONTINUED | OUTPATIENT
Start: 2018-10-23 | End: 2018-10-23 | Stop reason: HOSPADM

## 2018-10-23 RX ORDER — DIPHENHYDRAMINE HCL 25 MG
25 CAPSULE ORAL ONCE
Status: COMPLETED | OUTPATIENT
Start: 2018-10-23 | End: 2018-10-23

## 2018-10-23 RX ADMIN — SODIUM CHLORIDE 250 ML: 9 INJECTION, SOLUTION INTRAVENOUS at 08:56

## 2018-10-23 RX ADMIN — MAGNESIUM SULFATE HEPTAHYDRATE 4 G: 500 INJECTION, SOLUTION INTRAMUSCULAR; INTRAVENOUS at 09:23

## 2018-10-23 RX ADMIN — FAMOTIDINE 40 MG: 10 INJECTION INTRAVENOUS at 09:03

## 2018-10-23 RX ADMIN — DIPHENHYDRAMINE HYDROCHLORIDE 25 MG: 25 CAPSULE ORAL at 08:56

## 2018-10-23 RX ADMIN — SODIUM CHLORIDE, PRESERVATIVE FREE 500 UNITS: 5 INJECTION INTRAVENOUS at 11:20

## 2018-10-23 RX ADMIN — DEXAMETHASONE SODIUM PHOSPHATE 12 MG: 10 INJECTION, SOLUTION INTRAMUSCULAR; INTRAVENOUS at 09:06

## 2018-10-23 RX ADMIN — PACLITAXEL 138 MG: 6 INJECTION, SOLUTION INTRAVENOUS at 09:25

## 2018-10-23 RX ADMIN — POTASSIUM CHLORIDE 40 MEQ: 1500 TABLET, EXTENDED RELEASE ORAL at 09:30

## 2018-10-23 NOTE — PROGRESS NOTES
Infusion Nursing Note:  Odalys Nathan presents today for C2D36 taxol and magnesium.    Patient seen by provider today: No   present during visit today: Not Applicable.    Note: Patient denies new symptoms or side effects, has now recovered from a head cold.  K 3.1, gave 40meq PO.  Mag 0.9, gave 4gm IV.    Intravenous Access:  Labs drawn without difficulty.  Implanted Port.    Treatment Conditions:  Lab Results   Component Value Date    HGB 12.0 10/23/2018     Lab Results   Component Value Date    WBC 6.3 10/23/2018      Lab Results   Component Value Date    ANEU 3.1 10/23/2018     Lab Results   Component Value Date     10/23/2018      Results reviewed, labs MET treatment parameters, ok to proceed with treatment.      Post Infusion Assessment:  Patient tolerated infusion without incident.  Blood return noted pre and post infusion.  Site patent and intact, free from redness, edema or discomfort.  No evidence of extravasations.  Access discontinued per protocol.    Discharge Plan:   Copy of AVS reviewed with patient and/or family.  Patient will return 11/8/18 for next appointment.  Patient discharged in stable condition accompanied by: daughter.  Departure Mode: Ambulatory.    Sonia Pham RN

## 2018-10-24 ENCOUNTER — TELEPHONE (OUTPATIENT)
Dept: ONCOLOGY | Facility: CLINIC | Age: 58
End: 2018-10-24

## 2018-10-24 DIAGNOSIS — E83.42 HYPOMAGNESEMIA: ICD-10-CM

## 2018-10-24 DIAGNOSIS — E87.6 HYPOKALEMIA: Primary | ICD-10-CM

## 2018-10-24 NOTE — TELEPHONE ENCOUNTER
Called Odalys back regarding her Dovme Kosmeticshart message- potassium and magnesium remain low despite oral supplementation. Asymptomatic. Will have her return this week for lyte recheck and replacement and early next week as well. To keep monitoring closely and replacing intravenously. To continue oral supplementation as tolerated.  HAILE De Paz, FNP-C  Gynecologic Oncology  [facility]  Pager: [phone]

## 2018-10-25 ENCOUNTER — INFUSION THERAPY VISIT (OUTPATIENT)
Dept: INFUSION THERAPY | Facility: CLINIC | Age: 58
End: 2018-10-25
Attending: NURSE PRACTITIONER
Payer: COMMERCIAL

## 2018-10-25 ENCOUNTER — HOSPITAL ENCOUNTER (OUTPATIENT)
Facility: CLINIC | Age: 58
Setting detail: SPECIMEN
Discharge: HOME | End: 2018-10-25
Attending: NURSE PRACTITIONER | Admitting: NURSE PRACTITIONER
Payer: COMMERCIAL

## 2018-10-25 VITALS
DIASTOLIC BLOOD PRESSURE: 72 MMHG | OXYGEN SATURATION: 100 % | HEART RATE: 87 BPM | SYSTOLIC BLOOD PRESSURE: 118 MMHG | RESPIRATION RATE: 16 BRPM | TEMPERATURE: 97.4 F

## 2018-10-25 DIAGNOSIS — C56.1 OVARIAN CANCER, RIGHT (H): ICD-10-CM

## 2018-10-25 DIAGNOSIS — C56.2 OVARIAN CANCER, LEFT (H): ICD-10-CM

## 2018-10-25 DIAGNOSIS — E87.6 HYPOKALEMIA: Primary | ICD-10-CM

## 2018-10-25 DIAGNOSIS — D64.9 ANEMIA, UNSPECIFIED TYPE: ICD-10-CM

## 2018-10-25 DIAGNOSIS — E83.42 HYPOMAGNESEMIA: ICD-10-CM

## 2018-10-25 LAB
MAGNESIUM SERPL-MCNC: 1.1 MG/DL (ref 1.6–2.3)
POTASSIUM SERPL-SCNC: 3.8 MMOL/L (ref 3.4–5.3)

## 2018-10-25 PROCEDURE — 96365 THER/PROPH/DIAG IV INF INIT: CPT

## 2018-10-25 PROCEDURE — 84132 ASSAY OF SERUM POTASSIUM: CPT | Performed by: NURSE PRACTITIONER

## 2018-10-25 PROCEDURE — 25000128 H RX IP 250 OP 636: Performed by: NURSE PRACTITIONER

## 2018-10-25 PROCEDURE — 83735 ASSAY OF MAGNESIUM: CPT | Performed by: NURSE PRACTITIONER

## 2018-10-25 PROCEDURE — 96366 THER/PROPH/DIAG IV INF ADDON: CPT

## 2018-10-25 RX ORDER — HEPARIN SODIUM (PORCINE) LOCK FLUSH IV SOLN 100 UNIT/ML 100 UNIT/ML
500 SOLUTION INTRAVENOUS DAILY PRN
Status: CANCELLED
Start: 2018-10-25

## 2018-10-25 RX ORDER — MAGNESIUM SULFATE HEPTAHYDRATE 40 MG/ML
4 INJECTION, SOLUTION INTRAVENOUS EVERY 4 HOURS PRN
Status: DISCONTINUED | OUTPATIENT
Start: 2018-10-25 | End: 2018-10-25 | Stop reason: HOSPADM

## 2018-10-25 RX ORDER — FERROUS SULFATE 324(65)MG
TABLET, DELAYED RELEASE (ENTERIC COATED) ORAL
Qty: 90 TABLET | Refills: 0 | Status: SHIPPED | OUTPATIENT
Start: 2018-10-25 | End: 2018-11-25

## 2018-10-25 RX ADMIN — SODIUM CHLORIDE 1000 ML: 9 INJECTION, SOLUTION INTRAVENOUS at 13:30

## 2018-10-25 RX ADMIN — MAGNESIUM SULFATE IN WATER 4 G: 40 INJECTION, SOLUTION INTRAVENOUS at 13:30

## 2018-10-25 NOTE — MR AVS SNAPSHOT
After Visit Summary   10/25/2018    Odalys Nathan    MRN: [id number]           Patient Information     Date Of Birth          1960        Visit Information        Provider Department      10/25/2018 12:30 PM RH INFUSION CHAIR 11 [facility]        Today's Diagnoses     Hypokalemia    -  1    Hypomagnesemia        Ovarian cancer, right (H)        Ovarian cancer, left (H)           Follow-ups after your visit        Your next 10 appointments already scheduled     Oct 29, 2018 11:30 AM CDT   Level 3 with RH INFUSION CHAIR 10   [facility] ([facility])    [facility]  [address]  [facility] [address]   [phone]            Nov 08, 2018  7:45 AM CST   Lab with Infusion with RH LAB DRAW 1   [facility] ([facility])    [facility]  [address]  [facility] [address]   [phone]            Nov 08, 2018  8:00 AM CST   Return Visit with HAILE De Paz [facility] ([facility])    [facility]  [address]  [facility] [address]   [phone]            Nov 08, 2018  9:00 AM CST   Level 2 with RH INFUSION CHAIR 12   [facility] ([facility])    [facility]  [address]  [facility] [address]   [phone]              Future tests that were ordered for you today     Open Standing Orders        Priority Remaining Interval Expires Ordered    Potassium Routine 2/3 weekly 10/24/2019 10/24/2018    Mag Routine 2/3 weekly 10/24/2019 10/24/2018            Who to contact     If you have questions or need follow up information about today's clinic visit or your schedule please contact [facility]  directly at [phone].  Normal or non-critical lab and imaging results will be communicated to you by PushCoinhart, letter or phone within 4 business days after the clinic has received the results. If you do not hear from us within 7 days, please contact the clinic through PushCoinhart or phone. If you have a critical or abnormal lab result, we will notify you by phone as soon as possible.  Submit refill requests through Photometics or call your pharmacy and they will forward the refill request to us. Please allow 3 business days for your refill to be completed.          Additional Information About Your Visit        PushCoinhart Information     Photometics gives you secure access to your electronic health record. If you see a primary care provider, you can also send messages to your care team and make appointments. If you have questions, please call your primary care clinic.  If you do not have a primary care provider, please call [phone] and they will assist you.        Care EveryWhere ID     This is your Care EveryWhere ID. This could be used by other organizations to access your [facility] medical records  [id number]        Your Vitals Were     Pulse Temperature Respirations Pulse Oximetry          87 97.4  F (36.3  C) (Oral) 16 100%         Blood Pressure from Last 3 Encounters:   10/25/18 118/72   10/23/18 121/77   10/17/18 103/69    Weight from Last 3 Encounters:   10/23/18 64.7 kg (142 lb 9.6 oz)   10/17/18 64.5 kg (142 lb 4.8 oz)   10/11/18 65.4 kg (144 lb 3.2 oz)              We Performed the Following     Mag     Potassium          Today's Medication Changes          These changes are accurate as of 10/25/18  3:55 PM.  If you have any questions, ask your nurse or doctor.               These medicines have changed or have updated prescriptions.        Dose/Directions    Ferrous Sulfate 324 (65 Fe) MG Tbec   This may have changed:  See the new instructions.   Used for:  Ovarian cancer, right (H), Anemia, unspecified type,  Ovarian cancer, left (H)   Changed by:  Patricia Rocha APRN CNP        TAKE ONE TABLET BY MOUTH THREE TIMES DAILY WITH MEALS. TAKE WITH A SMALL AMOUNT OF ORANGE JUICE. DO NOT TAKE WITH CALCIUM   Quantity:  90 tablet   Refills:  0            Where to get your medicines      These medications were sent to [id number] - [place], [place] - [address]  [address] [facility] [address]    Hours:  9Am-9Pm (M-F) 9Am-6Pm (S&S) Phone:  [phone]     Ferrous Sulfate 324 (65 Fe) MG [facility]                Primary Care Provider Office Phone # Fax #    Aubrie Hester [phone]       [facility] [address] [facility] [address]        Equal Access to Services     SANDY CHAMBERS : Hadphyllis Walter, waaxjulieta luqadaha, qaybta kaalmada bel, blanka beckman . So [facility] [phone].    ATENCIÓN: Si habla español, tiene a bell disposición servicios gratuitos de asistencia lingüística. [facility] [phone].    We comply with applicable federal civil rights laws and [place] laws. We do not discriminate on the basis of race, color, national origin, age, disability, sex, sexual orientation, or gender identity.            Thank you!     Thank you for choosing [facility]  for your care. Our goal is always to provide you with excellent care. Hearing back from our patients is one way we can continue to improve our services. Please take a few minutes to complete the written survey that you may receive in the mail after your visit with us. Thank you!             Your Updated Medication List - Protect others around you: Learn how to safely use, store and throw away your medicines at www.disposemymeds.org.          This list is accurate as of 10/25/18  3:55 PM.  Always use your most recent med list.                   Brand Name Dispense Instructions for use Diagnosis    CALCIUM + D PO      Take 1 tablet by  mouth daily.    Pelvic mass       cyanocobalamin 1000 MCG/ML injection    VITAMIN B12    1 mL    Inject 1 mL (1,000 mcg) into the muscle every 30 days    B12 deficiency       diphenoxylate-atropine 2.5-0.025 MG per tablet    LOMOTIL    60 tablet    Take 2 tablets by mouth 4 times daily as needed for diarrhea    Acute diarrhea       Ferrous Sulfate 324 (65 Fe) MG Tbec     90 tablet    TAKE ONE TABLET BY MOUTH THREE TIMES DAILY WITH MEALS. TAKE WITH A SMALL AMOUNT OF ORANGE JUICE. DO NOT TAKE WITH CALCIUM    Ovarian cancer, right (H), Anemia, unspecified type, Ovarian cancer, left (H)       HERBALS      daily        LEVOTHYROXINE SODIUM PO      Take by mouth daily        loperamide 2 MG capsule    IMODIUM     Take 2 mg by mouth 4 times daily as needed for diarrhea        LORazepam 1 MG tablet    ATIVAN    30 tablet    Take 1 tablet (1 mg) by mouth every 6 hours as needed (Anxiety, Nausea/Vomiting or Sleep)    Ovarian cancer, unspecified laterality (H)       magnesium oxide 400 MG tablet    MAG-OX    90 tablet    Take 1 tablet (400 mg) by mouth 2 times daily    Ovarian cancer, unspecified laterality (H)       omeprazole 20 MG CR capsule    priLOSEC    30 capsule    Take 1 capsule (20 mg) by mouth daily    Dyspepsia       order for DME     3 each    Injection Supplies for Vitamin B12: 3cc syringes w/ 27 gauge needles, 1/2 inch length    B12 deficiency       potassium chloride 20 MEQ Packet    KLOR-CON     Take 20 mEq by mouth daily        VITAMIN C PO      Take 500 mg by mouth daily    Pelvic mass       VITAMIN E NATURAL PO      Take 100 Units by mouth daily

## 2018-10-25 NOTE — PROGRESS NOTES
Infusion Nursing Note:  Odalys Nathan presents today for IVF and mag replacement.    Patient seen by provider today: No   present during visit today: Not Applicable.    Note: electrolyte replacement per protocol.    Intravenous Access:  Labs drawn without difficulty.  Implanted Port.    Treatment Conditions:  Not Applicable.      Post Infusion Assessment:  Patient tolerated infusion without incident.  Blood return noted pre and post infusion.  Site patent and intact, free from redness, edema or discomfort.  No evidence of extravasations.  Access discontinued per protocol.    Discharge Plan:   Patient declined prescription refills.  Discharge instructions reviewed with: Patient.  Patient and/or family verbalized understanding of discharge instructions and all questions answered.  Copy of AVS reviewed with patient and/or family.  Patient will return 10/29/18 for next appointment.  Patient discharged in stable condition accompanied by: self.  Departure Mode: Ambulatory.    Payton Flynn RN

## 2018-10-25 NOTE — TELEPHONE ENCOUNTER
patient calling for refill of iron for the patient  Last visit with MD 9/20/18  Last order date    Ferrous Sulfate 324 (65 Fe) MG TBEC 90 tablet 0 9/12/2018  No      Sig: Take 1 tablet by mouth 3 times daily (with meals). Take with a small amount of orange juice. Do not take with calcium.       Please advise on refills for patient

## 2018-10-29 ENCOUNTER — HOSPITAL ENCOUNTER (OUTPATIENT)
Facility: CLINIC | Age: 58
Setting detail: SPECIMEN
Discharge: HOME | End: 2018-10-29
Attending: NURSE PRACTITIONER | Admitting: NURSE PRACTITIONER
Payer: COMMERCIAL

## 2018-10-29 ENCOUNTER — INFUSION THERAPY VISIT (OUTPATIENT)
Dept: INFUSION THERAPY | Facility: CLINIC | Age: 58
End: 2018-10-29
Attending: NURSE PRACTITIONER
Payer: COMMERCIAL

## 2018-10-29 VITALS
SYSTOLIC BLOOD PRESSURE: 119 MMHG | RESPIRATION RATE: 16 BRPM | HEART RATE: 89 BPM | OXYGEN SATURATION: 98 % | DIASTOLIC BLOOD PRESSURE: 70 MMHG | TEMPERATURE: 98.1 F

## 2018-10-29 DIAGNOSIS — C56.2 OVARIAN CANCER, LEFT (H): ICD-10-CM

## 2018-10-29 DIAGNOSIS — E83.42 HYPOMAGNESEMIA: ICD-10-CM

## 2018-10-29 DIAGNOSIS — E87.6 HYPOKALEMIA: Primary | ICD-10-CM

## 2018-10-29 DIAGNOSIS — C56.1 OVARIAN CANCER, RIGHT (H): ICD-10-CM

## 2018-10-29 LAB
MAGNESIUM SERPL-MCNC: 1 MG/DL (ref 1.6–2.3)
POTASSIUM SERPL-SCNC: 3.6 MMOL/L (ref 3.4–5.3)

## 2018-10-29 PROCEDURE — 25000128 H RX IP 250 OP 636: Performed by: NURSE PRACTITIONER

## 2018-10-29 PROCEDURE — 83735 ASSAY OF MAGNESIUM: CPT | Performed by: NURSE PRACTITIONER

## 2018-10-29 PROCEDURE — 96365 THER/PROPH/DIAG IV INF INIT: CPT

## 2018-10-29 PROCEDURE — 96366 THER/PROPH/DIAG IV INF ADDON: CPT

## 2018-10-29 PROCEDURE — 96361 HYDRATE IV INFUSION ADD-ON: CPT

## 2018-10-29 PROCEDURE — 84132 ASSAY OF SERUM POTASSIUM: CPT | Performed by: NURSE PRACTITIONER

## 2018-10-29 RX ORDER — HEPARIN SODIUM (PORCINE) LOCK FLUSH IV SOLN 100 UNIT/ML 100 UNIT/ML
500 SOLUTION INTRAVENOUS DAILY PRN
Status: DISCONTINUED | OUTPATIENT
Start: 2018-10-29 | End: 2018-10-29 | Stop reason: HOSPADM

## 2018-10-29 RX ORDER — MAGNESIUM SULFATE HEPTAHYDRATE 40 MG/ML
4 INJECTION, SOLUTION INTRAVENOUS ONCE
Status: COMPLETED | OUTPATIENT
Start: 2018-10-29 | End: 2018-10-29

## 2018-10-29 RX ORDER — HEPARIN SODIUM (PORCINE) LOCK FLUSH IV SOLN 100 UNIT/ML 100 UNIT/ML
500 SOLUTION INTRAVENOUS DAILY PRN
Status: CANCELLED
Start: 2018-10-29

## 2018-10-29 RX ADMIN — SODIUM CHLORIDE, PRESERVATIVE FREE 500 UNITS: 5 INJECTION INTRAVENOUS at 14:40

## 2018-10-29 RX ADMIN — MAGNESIUM SULFATE IN WATER 4 G: 40 INJECTION, SOLUTION INTRAVENOUS at 12:29

## 2018-10-29 RX ADMIN — SODIUM CHLORIDE 1000 ML: 9 INJECTION, SOLUTION INTRAVENOUS at 11:35

## 2018-10-29 NOTE — MR AVS SNAPSHOT
After Visit Summary   10/29/2018    Odalys Nathan    MRN: [id number]           Patient Information     Date Of Birth          1960        Visit Information        Provider Department      10/29/2018 11:30 AM RH INFUSION CHAIR 10 [facility]        Today's Diagnoses     Hypokalemia    -  1    Hypomagnesemia        Ovarian cancer, right (H)        Ovarian cancer, left (H)           Follow-ups after your visit        Your next 10 appointments already scheduled     Nov 08, 2018  7:45 AM CST   Lab with Infusion with RH LAB DRAW 1   [facility] ([facility])    [facility]  [address]  [facility] [address]   [phone]            Nov 08, 2018  8:00 AM CST   Return Visit with HAILE De Paz [facility] ([facility])    [facility]  [address]  [facility] [id number] [phone]            Nov 08, 2018  9:00 AM CST   Level 2 with RH INFUSION CHAIR 12   [facility] ([facility])    [facility]  [address]  [facility] [id number] [phone]              Who to contact     If you have questions or need follow up information about today's clinic visit or your schedule please contact [facility] directly at [phone].  Normal or non-critical lab and imaging results will be communicated to you by MyChart, letter or phone within 4 business days after the clinic has received the results. If you do not hear from us within 7 days, please contact the clinic through MyChart or phone. If you have a critical or abnormal lab result, we will notify you by phone as soon as possible.  Submit refill requests through [employer] or call your pharmacy and they will forward the refill  request to us. Please allow 3 business days for your refill to be completed.          Additional Information About Your Visit        MyChart Information     [employer] gives you secure access to your electronic health record. If you see a primary care provider, you can also send messages to your care team and make appointments. If you have questions, please call your primary care clinic.  If you do not have a primary care provider, please call [phone] and they will assist you.        Care EveryWhere ID     This is your Care EveryWhere ID. This could be used by other organizations to access your [facility] medical records  [id number]        Your Vitals Were     Pulse Temperature Respirations Pulse Oximetry          89 98.1  F (36.7  C) (Oral) 16 98%         Blood Pressure from Last 3 Encounters:   10/29/18 119/70   10/25/18 118/72   10/23/18 121/77    Weight from Last 3 Encounters:   10/23/18 64.7 kg (142 lb 9.6 oz)   10/17/18 64.5 kg (142 lb 4.8 oz)   10/11/18 65.4 kg (144 lb 3.2 oz)              We Performed the Following     [facility]        Primary Care Provider Office Phone # Fax #    Aubrie Hester [phone]       [facility] [address] [facility] [address]        Equal Access to Services     SANDY CHAMBERS : Hadii aad ku hadasho Soomaali, waaxda luqadaha, qaybta kaalmada adeegyada, blanka dempsey haymarquise beckman . So [facility] [phone].    ATENCIÓN: Si habla español, tiene a bell disposición servicios gratuitos de asistencia lingüística. Liban al [phone].    We comply with applicable federal civil rights laws and [place] laws. We do not discriminate on the basis of race, color, national origin, age, disability, sex, sexual orientation, or gender identity.            Thank you!     Thank you for choosing [facility] INFUSION SERVICES  for your care. Our goal is always to provide you with excellent care. Hearing back from our  patients is one way we can continue to improve our services. Please take a few minutes to complete the written survey that you may receive in the mail after your visit with us. Thank you!             Your Updated Medication List - Protect others around you: Learn how to safely use, store and throw away your medicines at www.disposemymeds.org.          This list is accurate as of 10/29/18  2:48 PM.  Always use your most recent med list.                   Brand Name Dispense Instructions for use Diagnosis    CALCIUM + D PO      Take 1 tablet by mouth daily.    Pelvic mass       cyanocobalamin 1000 MCG/ML injection    VITAMIN B12    1 mL    Inject 1 mL (1,000 mcg) into the muscle every 30 days    B12 deficiency       diphenoxylate-atropine 2.5-0.025 MG per tablet    LOMOTIL    60 tablet    Take 2 tablets by mouth 4 times daily as needed for diarrhea    Acute diarrhea       Ferrous Sulfate 324 (65 Fe) MG Tbec     90 tablet    TAKE ONE TABLET BY MOUTH THREE TIMES DAILY WITH MEALS. TAKE WITH A SMALL AMOUNT OF ORANGE JUICE. DO NOT TAKE WITH CALCIUM    Ovarian cancer, right (H), Anemia, unspecified type, Ovarian cancer, left (H)       HERBALS      daily        LEVOTHYROXINE SODIUM PO      Take by mouth daily        loperamide 2 MG capsule    IMODIUM     Take 2 mg by mouth 4 times daily as needed for diarrhea        LORazepam 1 MG tablet    ATIVAN    30 tablet    Take 1 tablet (1 mg) by mouth every 6 hours as needed (Anxiety, Nausea/Vomiting or Sleep)    Ovarian cancer, unspecified laterality (H)       magnesium oxide 400 MG tablet    MAG-OX    90 tablet    Take 1 tablet (400 mg) by mouth 2 times daily    Ovarian cancer, unspecified laterality (H)       omeprazole 20 MG CR capsule    priLOSEC    30 capsule    Take 1 capsule (20 mg) by mouth daily    Dyspepsia       order for DME     3 each    Injection Supplies for Vitamin B12: 3cc syringes w/ 27 gauge needles, 1/2 inch length    B12 deficiency       potassium chloride 20  MEQ Packet    KLOR-CON     Take 20 mEq by mouth daily        VITAMIN C PO      Take 500 mg by mouth daily    Pelvic mass       VITAMIN E NATURAL PO      Take 100 Units by mouth daily

## 2018-10-29 NOTE — PROGRESS NOTES
Infusion Nursing Note:  Odalys Nathan presents today for Labs and IVF and magnesium.    Patient seen by provider today: No   present during visit today: Not Applicable.    Note: N/A.    Intravenous Access:  Lab draw site right port, Needle type Méndez, Gauge 20.  Labs drawn without difficulty.  Implanted Port.    Treatment Conditions:  Lab Results   Component Value Date     10/11/2018                   Lab Results   Component Value Date    POTASSIUM 3.6 10/29/2018           Lab Results   Component Value Date    MAG 1.0 10/29/2018            Lab Results   Component Value Date    CR 0.71 10/11/2018                   Lab Results   Component Value Date    JOSE ALBERTO 8.8 10/11/2018                Lab Results   Component Value Date    BILITOTAL 0.4 10/11/2018           Lab Results   Component Value Date    ALBUMIN 3.5 10/11/2018                    Lab Results   Component Value Date    ALT 38 10/11/2018           Lab Results   Component Value Date    AST 22 10/11/2018       Results reviewed, labs MET treatment parameters, ok to proceed with treatment.      Post Infusion Assessment:  Patient tolerated infusion without incident.  Blood return noted pre and post infusion.  Site patent and intact, free from redness, edema or discomfort.  No evidence of extravasations.  Access discontinued per protocol.    Discharge Plan:   Discharge instructions reviewed with: Patient.  Patient and/or family verbalized understanding of discharge instructions and all questions answered.  AVS to patient via [employer].  Patient will return 11/8/18 for next appointment.   Patient discharged in stable condition accompanied by: self.  Departure Mode: Ambulatory.    Justine Fitch RN

## 2018-11-05 ENCOUNTER — HOSPITAL ENCOUNTER (OUTPATIENT)
Facility: CLINIC | Age: 58
Setting detail: SPECIMEN
Discharge: HOME | End: 2018-11-05
Attending: NURSE PRACTITIONER | Admitting: NURSE PRACTITIONER
Payer: COMMERCIAL

## 2018-11-05 ENCOUNTER — INFUSION THERAPY VISIT (OUTPATIENT)
Dept: INFUSION THERAPY | Facility: CLINIC | Age: 58
End: 2018-11-05
Attending: NURSE PRACTITIONER
Payer: COMMERCIAL

## 2018-11-05 VITALS
OXYGEN SATURATION: 98 % | DIASTOLIC BLOOD PRESSURE: 63 MMHG | RESPIRATION RATE: 16 BRPM | TEMPERATURE: 97.6 F | HEART RATE: 78 BPM | SYSTOLIC BLOOD PRESSURE: 119 MMHG

## 2018-11-05 DIAGNOSIS — E83.42 HYPOMAGNESEMIA: ICD-10-CM

## 2018-11-05 DIAGNOSIS — C56.1 OVARIAN CANCER, RIGHT (H): Primary | ICD-10-CM

## 2018-11-05 DIAGNOSIS — E87.6 HYPOKALEMIA: ICD-10-CM

## 2018-11-05 DIAGNOSIS — C56.2 OVARIAN CANCER, LEFT (H): ICD-10-CM

## 2018-11-05 LAB
MAGNESIUM SERPL-MCNC: 1.1 MG/DL (ref 1.6–2.3)
POTASSIUM SERPL-SCNC: 3.5 MMOL/L (ref 3.4–5.3)

## 2018-11-05 PROCEDURE — 25000128 H RX IP 250 OP 636: Performed by: NURSE PRACTITIONER

## 2018-11-05 PROCEDURE — 96366 THER/PROPH/DIAG IV INF ADDON: CPT

## 2018-11-05 PROCEDURE — 96365 THER/PROPH/DIAG IV INF INIT: CPT

## 2018-11-05 PROCEDURE — 83735 ASSAY OF MAGNESIUM: CPT | Performed by: NURSE PRACTITIONER

## 2018-11-05 PROCEDURE — 84132 ASSAY OF SERUM POTASSIUM: CPT | Performed by: NURSE PRACTITIONER

## 2018-11-05 PROCEDURE — 96361 HYDRATE IV INFUSION ADD-ON: CPT

## 2018-11-05 RX ORDER — MAGNESIUM SULFATE HEPTAHYDRATE 40 MG/ML
4 INJECTION, SOLUTION INTRAVENOUS ONCE
Status: COMPLETED | OUTPATIENT
Start: 2018-11-05 | End: 2018-11-05

## 2018-11-05 RX ORDER — HEPARIN SODIUM (PORCINE) LOCK FLUSH IV SOLN 100 UNIT/ML 100 UNIT/ML
500 SOLUTION INTRAVENOUS DAILY PRN
Status: CANCELLED
Start: 2018-11-05

## 2018-11-05 RX ADMIN — MAGNESIUM SULFATE IN WATER 4 G: 40 INJECTION, SOLUTION INTRAVENOUS at 09:00

## 2018-11-05 RX ADMIN — SODIUM CHLORIDE 1000 ML: 9 INJECTION, SOLUTION INTRAVENOUS at 08:24

## 2018-11-05 NOTE — PROGRESS NOTES
Infusion Nursing Note:  Odalys Nathan presents today for magnesium replacement and labs/IVF.    Patient seen by provider today: No   present during visit today: Not Applicable.    Note: Magnesium replaced per protocol.    Intravenous Access:  Labs drawn without difficulty.  Implanted Port.    Treatment Conditions:  Not Applicable.      Post Infusion Assessment:  Patient tolerated infusion without incident.  Blood return noted pre and post infusion.  Site patent and intact, free from redness, edema or discomfort.  No evidence of extravasations.  Access discontinued per protocol.    Discharge Plan:   Patient declined prescription refills.  Discharge instructions reviewed with: Patient.  Patient and/or family verbalized understanding of discharge instructions and all questions answered.  Copy of AVS reviewed with patient and/or family.  Patient will return 11/8/18 for next appointment.  Patient discharged in stable condition accompanied by: self.  Departure Mode: Ambulatory.    Payton Flynn RN

## 2018-11-05 NOTE — MR AVS SNAPSHOT
After Visit Summary   11/5/2018    Odalys Nathan    MRN: [id number]           Patient Information     Date Of Birth          1960        Visit Information        Provider Department      11/5/2018 8:00 AM RH INFUSION CHAIR 3 [facility]        Today's Diagnoses     Ovarian cancer, right (H)    -  1    Ovarian cancer, left (H)        Hypomagnesemia        Hypokalemia           Follow-ups after your visit        Your next 10 appointments already scheduled     Nov 08, 2018  7:45 AM CST   Lab with Infusion with RH LAB DRAW 1   [facility] ([facility])    [facility]  [address]  [facility] [address]   [phone]            Nov 08, 2018  8:00 AM CST   Return Visit with HAILE De Paz [facility] ([facility])    [facility]  [address]  [facility] [id number] [phone]            Nov 08, 2018  9:00 AM CST   Level 2 with RH INFUSION CHAIR 12   [facility] ([facility])    [facility]  [address]  [facility] [id number] [phone]              Who to contact     If you have questions or need follow up information about today's clinic visit or your schedule please contact [facility] INFUSION SERVICES directly at [phone].  Normal or non-critical lab and imaging results will be communicated to you by MyChart, letter or phone within 4 business days after the clinic has received the results. If you do not hear from us within 7 days, please contact the clinic through MyChart or phone. If you have a critical or abnormal lab result, we will notify you by phone as soon as possible.  Submit refill requests through [employer] or call your pharmacy and they will forward the refill request  to us. Please allow 3 business days for your refill to be completed.          Additional Information About Your Visit        MyChart Information     [employer] gives you secure access to your electronic health record. If you see a primary care provider, you can also send messages to your care team and make appointments. If you have questions, please call your primary care clinic.  If you do not have a primary care provider, please call [phone] and they will assist you.        Care EveryWhere ID     This is your Care EveryWhere ID. This could be used by other organizations to access your [place] [facility] medical records  [id number]        Your Vitals Were     Pulse Temperature Respirations Pulse Oximetry          78 97.6  F (36.4  C) (Tympanic) 16 98%         Blood Pressure from Last 3 Encounters:   11/05/18 119/63   10/29/18 119/70   10/25/18 118/72    Weight from Last 3 Encounters:   10/23/18 64.7 kg (142 lb 9.6 oz)   10/17/18 64.5 kg (142 lb 4.8 oz)   10/11/18 65.4 kg (144 lb 3.2 oz)              We Performed the Following     [facility]        Primary Care Provider Office Phone # Fax #    Aubrie Hester [phone]       [facility] [address] [facility] [address]        Equal Access to Services     SANDY CHAMBERS : Hadii aad ku hadasho Soomaali, waaxda luqadaha, qaybta kaalmada adeegyada, blanka dempsey haymarquise beckman . So [facility] [phone].    ATENCIÓN: Si habla español, tiene a bell disposición servicios gratuitos de asistencia lingüística. Brijeshame al [phone].    We comply with applicable federal civil rights laws and [place] laws. We do not discriminate on the basis of race, color, national origin, age, disability, sex, sexual orientation, or gender identity.            Thank you!     Thank you for choosing [facility] INF[facility]ION SERVICES  for your care. Our goal is always to provide you with excellent care. Hearing back from our  patients is one way we can continue to improve our services. Please take a few minutes to complete the written survey that you may receive in the mail after your visit with us. Thank you!             Your Updated Medication List - Protect others around you: Learn how to safely use, store and throw away your medicines at www.disposemymeds.org.          This list is accurate as of 11/5/18 11:34 AM.  Always use your most recent med list.                   Brand Name Dispense Instructions for use Diagnosis    CALCIUM + D PO      Take 1 tablet by mouth daily.    Pelvic mass       cyanocobalamin 1000 MCG/ML injection    VITAMIN B12    1 mL    Inject 1 mL (1,000 mcg) into the muscle every 30 days    B12 deficiency       diphenoxylate-atropine 2.5-0.025 MG per tablet    LOMOTIL    60 tablet    Take 2 tablets by mouth 4 times daily as needed for diarrhea    Acute diarrhea       Ferrous Sulfate 324 (65 Fe) MG Tbec     90 tablet    TAKE ONE TABLET BY MOUTH THREE TIMES DAILY WITH MEALS. TAKE WITH A SMALL AMOUNT OF ORANGE JUICE. DO NOT TAKE WITH CALCIUM    Ovarian cancer, right (H), Anemia, unspecified type, Ovarian cancer, left (H)       HERBALS      daily        LEVOTHYROXINE SODIUM PO      Take by mouth daily        loperamide 2 MG capsule    IMODIUM     Take 2 mg by mouth 4 times daily as needed for diarrhea        LORazepam 1 MG tablet    ATIVAN    30 tablet    Take 1 tablet (1 mg) by mouth every 6 hours as needed (Anxiety, Nausea/Vomiting or Sleep)    Ovarian cancer, unspecified laterality (H)       magnesium oxide 400 MG tablet    MAG-OX    90 tablet    Take 1 tablet (400 mg) by mouth 2 times daily    Ovarian cancer, unspecified laterality (H)       omeprazole 20 MG CR capsule    priLOSEC    30 capsule    Take 1 capsule (20 mg) by mouth daily    Dyspepsia       order for DME     3 each    Injection Supplies for Vitamin B12: 3cc syringes w/ 27 gauge needles, 1/2 inch length    B12 deficiency       potassium chloride 20 MEQ  Packet    KLOR-CON     Take 20 mEq by mouth daily        VITAMIN C PO      Take 500 mg by mouth daily    Pelvic mass       VITAMIN E NATURAL PO      Take 100 Units by mouth daily

## 2018-11-08 ENCOUNTER — HOSPITAL ENCOUNTER (OUTPATIENT)
Facility: CLINIC | Age: 58
Setting detail: SPECIMEN
Discharge: HOME | End: 2018-11-08
Attending: NURSE PRACTITIONER | Admitting: NURSE PRACTITIONER
Payer: COMMERCIAL

## 2018-11-08 ENCOUNTER — INFUSION THERAPY VISIT (OUTPATIENT)
Dept: INFUSION THERAPY | Facility: CLINIC | Age: 58
End: 2018-11-08
Attending: NURSE PRACTITIONER
Payer: COMMERCIAL

## 2018-11-08 ENCOUNTER — ONCOLOGY VISIT (OUTPATIENT)
Dept: ONCOLOGY | Facility: CLINIC | Age: 58
End: 2018-11-08
Attending: NURSE PRACTITIONER
Payer: COMMERCIAL

## 2018-11-08 VITALS
RESPIRATION RATE: 16 BRPM | WEIGHT: 144 LBS | HEART RATE: 92 BPM | OXYGEN SATURATION: 97 % | SYSTOLIC BLOOD PRESSURE: 124 MMHG | DIASTOLIC BLOOD PRESSURE: 68 MMHG | HEIGHT: 65 IN | BODY MASS INDEX: 23.99 KG/M2 | TEMPERATURE: 98.2 F

## 2018-11-08 DIAGNOSIS — C56.2 OVARIAN CANCER, LEFT (H): ICD-10-CM

## 2018-11-08 DIAGNOSIS — D70.2 DRUG-INDUCED NEUTROPENIA (H): ICD-10-CM

## 2018-11-08 DIAGNOSIS — C56.1 OVARIAN CANCER, RIGHT (H): ICD-10-CM

## 2018-11-08 DIAGNOSIS — R06.2 WHEEZING: ICD-10-CM

## 2018-11-08 DIAGNOSIS — E83.42 HYPOMAGNESEMIA: ICD-10-CM

## 2018-11-08 DIAGNOSIS — Z79.899 ENCOUNTER FOR LONG-TERM (CURRENT) USE OF MEDICATIONS: Primary | ICD-10-CM

## 2018-11-08 DIAGNOSIS — C56.9 OVARIAN CANCER, UNSPECIFIED LATERALITY (H): Primary | ICD-10-CM

## 2018-11-08 LAB
ALBUMIN SERPL-MCNC: 3.6 G/DL (ref 3.4–5)
ALP SERPL-CCNC: 164 U/L (ref 40–150)
ALT SERPL W P-5'-P-CCNC: 26 U/L (ref 0–50)
ANION GAP SERPL CALCULATED.3IONS-SCNC: 8 MMOL/L (ref 3–14)
AST SERPL W P-5'-P-CCNC: 21 U/L (ref 0–45)
BASOPHILS # BLD AUTO: 0.1 10E9/L (ref 0–0.2)
BASOPHILS NFR BLD AUTO: 0.8 %
BILIRUB SERPL-MCNC: 0.3 MG/DL (ref 0.2–1.3)
BUN SERPL-MCNC: 13 MG/DL (ref 7–30)
CALCIUM SERPL-MCNC: 9.3 MG/DL (ref 8.5–10.1)
CANCER AG125 SERPL-ACNC: 26 U/ML (ref 0–30)
CHLORIDE SERPL-SCNC: 103 MMOL/L (ref 94–109)
CO2 SERPL-SCNC: 26 MMOL/L (ref 20–32)
CREAT SERPL-MCNC: 0.78 MG/DL (ref 0.52–1.04)
DIFFERENTIAL METHOD BLD: ABNORMAL
EOSINOPHIL # BLD AUTO: 0.2 10E9/L (ref 0–0.7)
EOSINOPHIL NFR BLD AUTO: 2 %
ERYTHROCYTE [DISTWIDTH] IN BLOOD BY AUTOMATED COUNT: 14.7 % (ref 10–15)
GFR SERPL CREATININE-BSD FRML MDRD: 76 ML/MIN/1.7M2
GLUCOSE SERPL-MCNC: 107 MG/DL (ref 70–99)
HCT VFR BLD AUTO: 38.4 % (ref 35–47)
HGB BLD-MCNC: 12.6 G/DL (ref 11.7–15.7)
IMM GRANULOCYTES # BLD: 0.2 10E9/L (ref 0–0.4)
IMM GRANULOCYTES NFR BLD: 2 %
LYMPHOCYTES # BLD AUTO: 3 10E9/L (ref 0.8–5.3)
LYMPHOCYTES NFR BLD AUTO: 29.9 %
MAGNESIUM SERPL-MCNC: 1.2 MG/DL (ref 1.6–2.3)
MCH RBC QN AUTO: 35.2 PG (ref 26.5–33)
MCHC RBC AUTO-ENTMCNC: 32.8 G/DL (ref 31.5–36.5)
MCV RBC AUTO: 107 FL (ref 78–100)
MONOCYTES # BLD AUTO: 1 10E9/L (ref 0–1.3)
MONOCYTES NFR BLD AUTO: 10.2 %
NEUTROPHILS # BLD AUTO: 5.5 10E9/L (ref 1.6–8.3)
NEUTROPHILS NFR BLD AUTO: 55.1 %
NRBC # BLD AUTO: 0 10*3/UL
NRBC BLD AUTO-RTO: 0 /100
PLATELET # BLD AUTO: 272 10E9/L (ref 150–450)
POTASSIUM SERPL-SCNC: 4.1 MMOL/L (ref 3.4–5.3)
PROT SERPL-MCNC: 7.1 G/DL (ref 6.8–8.8)
RBC # BLD AUTO: 3.58 10E12/L (ref 3.8–5.2)
SODIUM SERPL-SCNC: 137 MMOL/L (ref 133–144)
WBC # BLD AUTO: 10 10E9/L (ref 4–11)

## 2018-11-08 PROCEDURE — 86304 IMMUNOASSAY TUMOR CA 125: CPT | Performed by: NURSE PRACTITIONER

## 2018-11-08 PROCEDURE — 96368 THER/DIAG CONCURRENT INF: CPT

## 2018-11-08 PROCEDURE — 85025 COMPLETE CBC W/AUTO DIFF WBC: CPT | Performed by: NURSE PRACTITIONER

## 2018-11-08 PROCEDURE — 99213 OFFICE O/P EST LOW 20 MIN: CPT | Performed by: NURSE PRACTITIONER

## 2018-11-08 PROCEDURE — 25000128 H RX IP 250 OP 636: Performed by: NURSE PRACTITIONER

## 2018-11-08 PROCEDURE — 96375 TX/PRO/DX INJ NEW DRUG ADDON: CPT

## 2018-11-08 PROCEDURE — 25000125 ZZHC RX 250: Performed by: NURSE PRACTITIONER

## 2018-11-08 PROCEDURE — 25000132 ZZH RX MED GY IP 250 OP 250 PS 637: Performed by: NURSE PRACTITIONER

## 2018-11-08 PROCEDURE — 80053 COMPREHEN METABOLIC PANEL: CPT | Performed by: NURSE PRACTITIONER

## 2018-11-08 PROCEDURE — 83735 ASSAY OF MAGNESIUM: CPT | Performed by: NURSE PRACTITIONER

## 2018-11-08 PROCEDURE — G0463 HOSPITAL OUTPT CLINIC VISIT: HCPCS | Mod: 25

## 2018-11-08 PROCEDURE — 96413 CHEMO IV INFUSION 1 HR: CPT

## 2018-11-08 RX ORDER — MEPERIDINE HYDROCHLORIDE 25 MG/ML
25 INJECTION INTRAMUSCULAR; INTRAVENOUS; SUBCUTANEOUS EVERY 30 MIN PRN
Status: CANCELLED | OUTPATIENT
Start: 2018-11-08

## 2018-11-08 RX ORDER — DIPHENHYDRAMINE HCL 25 MG
25 CAPSULE ORAL ONCE
Status: CANCELLED
Start: 2018-11-29

## 2018-11-08 RX ORDER — METHYLPREDNISOLONE SODIUM SUCCINATE 125 MG/2ML
125 INJECTION, POWDER, LYOPHILIZED, FOR SOLUTION INTRAMUSCULAR; INTRAVENOUS
Status: CANCELLED
Start: 2018-12-06

## 2018-11-08 RX ORDER — DIPHENHYDRAMINE HYDROCHLORIDE 50 MG/ML
50 INJECTION INTRAMUSCULAR; INTRAVENOUS
Status: CANCELLED
Start: 2018-11-15

## 2018-11-08 RX ORDER — HEPARIN SODIUM (PORCINE) LOCK FLUSH IV SOLN 100 UNIT/ML 100 UNIT/ML
500 SOLUTION INTRAVENOUS EVERY 8 HOURS
Status: CANCELLED
Start: 2018-11-22

## 2018-11-08 RX ORDER — HEPARIN SODIUM (PORCINE) LOCK FLUSH IV SOLN 100 UNIT/ML 100 UNIT/ML
500 SOLUTION INTRAVENOUS EVERY 8 HOURS
Status: CANCELLED
Start: 2018-11-08

## 2018-11-08 RX ORDER — DIPHENHYDRAMINE HCL 25 MG
25 CAPSULE ORAL ONCE
Status: CANCELLED
Start: 2018-11-15

## 2018-11-08 RX ORDER — DIPHENHYDRAMINE HCL 25 MG
25 CAPSULE ORAL ONCE
Status: CANCELLED
Start: 2018-12-13

## 2018-11-08 RX ORDER — MEPERIDINE HYDROCHLORIDE 25 MG/ML
25 INJECTION INTRAMUSCULAR; INTRAVENOUS; SUBCUTANEOUS EVERY 30 MIN PRN
Status: CANCELLED | OUTPATIENT
Start: 2018-12-13

## 2018-11-08 RX ORDER — ALBUTEROL SULFATE 90 UG/1
1-2 AEROSOL, METERED RESPIRATORY (INHALATION)
Status: CANCELLED
Start: 2018-11-22

## 2018-11-08 RX ORDER — DIPHENHYDRAMINE HCL 25 MG
25 CAPSULE ORAL ONCE
Status: CANCELLED
Start: 2018-12-06

## 2018-11-08 RX ORDER — DIPHENHYDRAMINE HCL 25 MG
25 CAPSULE ORAL ONCE
Status: CANCELLED
Start: 2018-11-08

## 2018-11-08 RX ORDER — ALBUTEROL SULFATE 90 UG/1
1-2 AEROSOL, METERED RESPIRATORY (INHALATION)
Status: CANCELLED
Start: 2018-11-29

## 2018-11-08 RX ORDER — ALBUTEROL SULFATE 0.83 MG/ML
2.5 SOLUTION RESPIRATORY (INHALATION)
Status: CANCELLED | OUTPATIENT
Start: 2018-12-13

## 2018-11-08 RX ORDER — ALBUTEROL SULFATE 90 UG/1
1-2 AEROSOL, METERED RESPIRATORY (INHALATION)
Status: CANCELLED
Start: 2018-12-13

## 2018-11-08 RX ORDER — METHYLPREDNISOLONE SODIUM SUCCINATE 125 MG/2ML
125 INJECTION, POWDER, LYOPHILIZED, FOR SOLUTION INTRAMUSCULAR; INTRAVENOUS
Status: CANCELLED
Start: 2018-11-22

## 2018-11-08 RX ORDER — HEPARIN SODIUM (PORCINE) LOCK FLUSH IV SOLN 100 UNIT/ML 100 UNIT/ML
500 SOLUTION INTRAVENOUS EVERY 8 HOURS
Status: DISCONTINUED | OUTPATIENT
Start: 2018-11-08 | End: 2018-11-08 | Stop reason: HOSPADM

## 2018-11-08 RX ORDER — EPINEPHRINE 0.3 MG/.3ML
0.3 INJECTION SUBCUTANEOUS EVERY 5 MIN PRN
Status: CANCELLED | OUTPATIENT
Start: 2018-12-13

## 2018-11-08 RX ORDER — SODIUM CHLORIDE 9 MG/ML
1000 INJECTION, SOLUTION INTRAVENOUS CONTINUOUS PRN
Status: CANCELLED
Start: 2018-12-13

## 2018-11-08 RX ORDER — EPINEPHRINE 1 MG/ML
0.3 INJECTION, SOLUTION INTRAMUSCULAR; SUBCUTANEOUS EVERY 5 MIN PRN
Status: CANCELLED | OUTPATIENT
Start: 2018-11-08

## 2018-11-08 RX ORDER — METHYLPREDNISOLONE SODIUM SUCCINATE 125 MG/2ML
125 INJECTION, POWDER, LYOPHILIZED, FOR SOLUTION INTRAMUSCULAR; INTRAVENOUS
Status: CANCELLED
Start: 2018-11-08

## 2018-11-08 RX ORDER — SODIUM CHLORIDE 9 MG/ML
1000 INJECTION, SOLUTION INTRAVENOUS CONTINUOUS PRN
Status: CANCELLED
Start: 2018-12-06

## 2018-11-08 RX ORDER — EPINEPHRINE 1 MG/ML
0.3 INJECTION, SOLUTION INTRAMUSCULAR; SUBCUTANEOUS EVERY 5 MIN PRN
Status: CANCELLED | OUTPATIENT
Start: 2018-11-15

## 2018-11-08 RX ORDER — LORAZEPAM 2 MG/ML
1 INJECTION INTRAMUSCULAR EVERY 6 HOURS PRN
Status: CANCELLED
Start: 2018-11-22

## 2018-11-08 RX ORDER — METHYLPREDNISOLONE SODIUM SUCCINATE 125 MG/2ML
125 INJECTION, POWDER, LYOPHILIZED, FOR SOLUTION INTRAMUSCULAR; INTRAVENOUS
Status: CANCELLED
Start: 2018-11-15

## 2018-11-08 RX ORDER — DIPHENHYDRAMINE HYDROCHLORIDE 50 MG/ML
50 INJECTION INTRAMUSCULAR; INTRAVENOUS
Status: CANCELLED
Start: 2018-11-08

## 2018-11-08 RX ORDER — ALBUTEROL SULFATE 90 UG/1
1-2 AEROSOL, METERED RESPIRATORY (INHALATION)
Status: CANCELLED
Start: 2018-12-06

## 2018-11-08 RX ORDER — DIPHENHYDRAMINE HCL 25 MG
25 CAPSULE ORAL ONCE
Status: CANCELLED
Start: 2018-11-22

## 2018-11-08 RX ORDER — EPINEPHRINE 0.3 MG/.3ML
0.3 INJECTION SUBCUTANEOUS EVERY 5 MIN PRN
Status: CANCELLED | OUTPATIENT
Start: 2018-11-08

## 2018-11-08 RX ORDER — MEPERIDINE HYDROCHLORIDE 25 MG/ML
25 INJECTION INTRAMUSCULAR; INTRAVENOUS; SUBCUTANEOUS EVERY 30 MIN PRN
Status: CANCELLED | OUTPATIENT
Start: 2018-11-15

## 2018-11-08 RX ORDER — METHYLPREDNISOLONE SODIUM SUCCINATE 125 MG/2ML
125 INJECTION, POWDER, LYOPHILIZED, FOR SOLUTION INTRAMUSCULAR; INTRAVENOUS
Status: CANCELLED
Start: 2018-12-13

## 2018-11-08 RX ORDER — LORAZEPAM 2 MG/ML
1 INJECTION INTRAMUSCULAR EVERY 6 HOURS PRN
Status: CANCELLED
Start: 2018-11-15

## 2018-11-08 RX ORDER — MEPERIDINE HYDROCHLORIDE 25 MG/ML
25 INJECTION INTRAMUSCULAR; INTRAVENOUS; SUBCUTANEOUS EVERY 30 MIN PRN
Status: CANCELLED | OUTPATIENT
Start: 2018-11-29

## 2018-11-08 RX ORDER — EPINEPHRINE 1 MG/ML
0.3 INJECTION, SOLUTION INTRAMUSCULAR; SUBCUTANEOUS EVERY 5 MIN PRN
Status: CANCELLED | OUTPATIENT
Start: 2018-12-13

## 2018-11-08 RX ORDER — ALBUTEROL SULFATE 90 UG/1
1-2 AEROSOL, METERED RESPIRATORY (INHALATION)
Status: CANCELLED
Start: 2018-11-08

## 2018-11-08 RX ORDER — HEPARIN SODIUM (PORCINE) LOCK FLUSH IV SOLN 100 UNIT/ML 100 UNIT/ML
500 SOLUTION INTRAVENOUS EVERY 8 HOURS
Status: CANCELLED
Start: 2018-11-15

## 2018-11-08 RX ORDER — HEPARIN SODIUM (PORCINE) LOCK FLUSH IV SOLN 100 UNIT/ML 100 UNIT/ML
500 SOLUTION INTRAVENOUS EVERY 8 HOURS
Status: CANCELLED
Start: 2018-12-06

## 2018-11-08 RX ORDER — METHYLPREDNISOLONE SODIUM SUCCINATE 125 MG/2ML
125 INJECTION, POWDER, LYOPHILIZED, FOR SOLUTION INTRAMUSCULAR; INTRAVENOUS
Status: CANCELLED
Start: 2018-11-29

## 2018-11-08 RX ORDER — ALBUTEROL SULFATE 0.83 MG/ML
2.5 SOLUTION RESPIRATORY (INHALATION)
Status: CANCELLED | OUTPATIENT
Start: 2018-11-22

## 2018-11-08 RX ORDER — ALBUTEROL SULFATE 0.83 MG/ML
2.5 SOLUTION RESPIRATORY (INHALATION)
Status: CANCELLED | OUTPATIENT
Start: 2018-12-06

## 2018-11-08 RX ORDER — ALBUTEROL SULFATE 0.83 MG/ML
2.5 SOLUTION RESPIRATORY (INHALATION)
Status: CANCELLED | OUTPATIENT
Start: 2018-11-15

## 2018-11-08 RX ORDER — SODIUM CHLORIDE 9 MG/ML
1000 INJECTION, SOLUTION INTRAVENOUS CONTINUOUS PRN
Status: CANCELLED
Start: 2018-11-22

## 2018-11-08 RX ORDER — LORAZEPAM 2 MG/ML
1 INJECTION INTRAMUSCULAR EVERY 6 HOURS PRN
Status: CANCELLED
Start: 2018-11-08

## 2018-11-08 RX ORDER — EPINEPHRINE 1 MG/ML
0.3 INJECTION, SOLUTION INTRAMUSCULAR; SUBCUTANEOUS EVERY 5 MIN PRN
Status: CANCELLED | OUTPATIENT
Start: 2018-12-06

## 2018-11-08 RX ORDER — MEPERIDINE HYDROCHLORIDE 25 MG/ML
25 INJECTION INTRAMUSCULAR; INTRAVENOUS; SUBCUTANEOUS EVERY 30 MIN PRN
Status: CANCELLED | OUTPATIENT
Start: 2018-11-22

## 2018-11-08 RX ORDER — LORAZEPAM 2 MG/ML
1 INJECTION INTRAMUSCULAR EVERY 6 HOURS PRN
Status: CANCELLED
Start: 2018-12-13

## 2018-11-08 RX ORDER — ALBUTEROL SULFATE 0.83 MG/ML
2.5 SOLUTION RESPIRATORY (INHALATION)
Status: CANCELLED | OUTPATIENT
Start: 2018-11-08

## 2018-11-08 RX ORDER — LORAZEPAM 2 MG/ML
1 INJECTION INTRAMUSCULAR EVERY 6 HOURS PRN
Status: CANCELLED
Start: 2018-12-06

## 2018-11-08 RX ORDER — SODIUM CHLORIDE 9 MG/ML
1000 INJECTION, SOLUTION INTRAVENOUS CONTINUOUS PRN
Status: CANCELLED
Start: 2018-11-08

## 2018-11-08 RX ORDER — EPINEPHRINE 1 MG/ML
0.3 INJECTION, SOLUTION INTRAMUSCULAR; SUBCUTANEOUS EVERY 5 MIN PRN
Status: CANCELLED | OUTPATIENT
Start: 2018-11-22

## 2018-11-08 RX ORDER — EPINEPHRINE 1 MG/ML
0.3 INJECTION, SOLUTION INTRAMUSCULAR; SUBCUTANEOUS EVERY 5 MIN PRN
Status: CANCELLED | OUTPATIENT
Start: 2018-11-29

## 2018-11-08 RX ORDER — DIPHENHYDRAMINE HYDROCHLORIDE 50 MG/ML
50 INJECTION INTRAMUSCULAR; INTRAVENOUS
Status: CANCELLED
Start: 2018-11-29

## 2018-11-08 RX ORDER — ALBUTEROL SULFATE 90 UG/1
1-2 AEROSOL, METERED RESPIRATORY (INHALATION)
Status: CANCELLED
Start: 2018-11-15

## 2018-11-08 RX ORDER — DIPHENHYDRAMINE HCL 25 MG
25 CAPSULE ORAL ONCE
Status: COMPLETED | OUTPATIENT
Start: 2018-11-08 | End: 2018-11-08

## 2018-11-08 RX ORDER — DIPHENHYDRAMINE HYDROCHLORIDE 50 MG/ML
50 INJECTION INTRAMUSCULAR; INTRAVENOUS
Status: CANCELLED
Start: 2018-12-13

## 2018-11-08 RX ORDER — MEPERIDINE HYDROCHLORIDE 25 MG/ML
25 INJECTION INTRAMUSCULAR; INTRAVENOUS; SUBCUTANEOUS EVERY 30 MIN PRN
Status: CANCELLED | OUTPATIENT
Start: 2018-12-06

## 2018-11-08 RX ORDER — HEPARIN SODIUM (PORCINE) LOCK FLUSH IV SOLN 100 UNIT/ML 100 UNIT/ML
500 SOLUTION INTRAVENOUS EVERY 8 HOURS
Status: CANCELLED
Start: 2018-11-29

## 2018-11-08 RX ORDER — LORAZEPAM 2 MG/ML
1 INJECTION INTRAMUSCULAR EVERY 6 HOURS PRN
Status: CANCELLED
Start: 2018-11-29

## 2018-11-08 RX ORDER — ALBUTEROL SULFATE 90 UG/1
2 AEROSOL, METERED RESPIRATORY (INHALATION) EVERY 6 HOURS PRN
Qty: 1 INHALER | Refills: 1 | Status: ON HOLD | OUTPATIENT
Start: 2018-11-08 | End: 2019-01-01

## 2018-11-08 RX ORDER — ALBUTEROL SULFATE 0.83 MG/ML
2.5 SOLUTION RESPIRATORY (INHALATION)
Status: CANCELLED | OUTPATIENT
Start: 2018-11-29

## 2018-11-08 RX ORDER — DIPHENHYDRAMINE HYDROCHLORIDE 50 MG/ML
50 INJECTION INTRAMUSCULAR; INTRAVENOUS
Status: CANCELLED
Start: 2018-11-22

## 2018-11-08 RX ORDER — EPINEPHRINE 0.3 MG/.3ML
0.3 INJECTION SUBCUTANEOUS EVERY 5 MIN PRN
Status: CANCELLED | OUTPATIENT
Start: 2018-11-29

## 2018-11-08 RX ORDER — EPINEPHRINE 0.3 MG/.3ML
0.3 INJECTION SUBCUTANEOUS EVERY 5 MIN PRN
Status: CANCELLED | OUTPATIENT
Start: 2018-11-22

## 2018-11-08 RX ORDER — SODIUM CHLORIDE 9 MG/ML
1000 INJECTION, SOLUTION INTRAVENOUS CONTINUOUS PRN
Status: CANCELLED
Start: 2018-11-15

## 2018-11-08 RX ORDER — DIPHENHYDRAMINE HYDROCHLORIDE 50 MG/ML
50 INJECTION INTRAMUSCULAR; INTRAVENOUS
Status: CANCELLED
Start: 2018-12-06

## 2018-11-08 RX ORDER — HEPARIN SODIUM (PORCINE) LOCK FLUSH IV SOLN 100 UNIT/ML 100 UNIT/ML
500 SOLUTION INTRAVENOUS EVERY 8 HOURS
Status: CANCELLED
Start: 2018-12-13

## 2018-11-08 RX ORDER — SODIUM CHLORIDE 9 MG/ML
1000 INJECTION, SOLUTION INTRAVENOUS CONTINUOUS PRN
Status: CANCELLED
Start: 2018-11-29

## 2018-11-08 RX ORDER — EPINEPHRINE 0.3 MG/.3ML
0.3 INJECTION SUBCUTANEOUS EVERY 5 MIN PRN
Status: CANCELLED | OUTPATIENT
Start: 2018-11-15

## 2018-11-08 RX ORDER — EPINEPHRINE 0.3 MG/.3ML
0.3 INJECTION SUBCUTANEOUS EVERY 5 MIN PRN
Status: CANCELLED | OUTPATIENT
Start: 2018-12-06

## 2018-11-08 RX ADMIN — DEXAMETHASONE SODIUM PHOSPHATE 12 MG: 10 INJECTION, SOLUTION INTRAMUSCULAR; INTRAVENOUS at 09:14

## 2018-11-08 RX ADMIN — MAGNESIUM SULFATE HEPTAHYDRATE 2 G: 500 INJECTION, SOLUTION INTRAMUSCULAR; INTRAVENOUS at 09:33

## 2018-11-08 RX ADMIN — SODIUM CHLORIDE, PRESERVATIVE FREE 500 UNITS: 5 INJECTION INTRAVENOUS at 10:41

## 2018-11-08 RX ADMIN — SODIUM CHLORIDE 250 ML: 9 INJECTION, SOLUTION INTRAVENOUS at 09:10

## 2018-11-08 RX ADMIN — FAMOTIDINE 40 MG: 10 INJECTION INTRAVENOUS at 09:10

## 2018-11-08 RX ADMIN — DIPHENHYDRAMINE HYDROCHLORIDE 25 MG: 25 CAPSULE ORAL at 09:10

## 2018-11-08 RX ADMIN — PACLITAXEL 138 MG: 6 INJECTION, SOLUTION INTRAVENOUS at 09:35

## 2018-11-08 ASSESSMENT — ENCOUNTER SYMPTOMS
MUSCLE CRAMPS: 0
JAUNDICE: 0
SYNCOPE: 0
LEG PAIN: 0
TACHYCARDIA: 0
LIGHT-HEADEDNESS: 0
POOR WOUND HEALING: 0
WHEEZING: 1
HOT FLASHES: 0
TREMORS: 0
BRUISES/BLEEDS EASILY: 0
NAUSEA: 0
SWOLLEN GLANDS: 0
ABDOMINAL PAIN: 0
JOINT SWELLING: 0
DYSURIA: 0
PANIC: 0
SNORES LOUDLY: 0
DIFFICULTY URINATING: 0
SORE THROAT: 0
TROUBLE SWALLOWING: 0
BLOATING: 0
VOMITING: 0
EYE IRRITATION: 0
DISTURBANCES IN COORDINATION: 0
DECREASED CONCENTRATION: 0
ALTERED TEMPERATURE REGULATION: 0
TINGLING: 1
CLAUDICATION: 0
SEIZURES: 0
NAIL CHANGES: 0
RECTAL PAIN: 0
NIGHT SWEATS: 0
EYE WATERING: 0
HEARTBURN: 0
BACK PAIN: 0
SLEEP DISTURBANCES DUE TO BREATHING: 0
SPEECH CHANGE: 0
ARTHRALGIAS: 0
POSTURAL DYSPNEA: 0
EYE PAIN: 0
DIZZINESS: 0
DIARRHEA: 1
HEADACHES: 0
SHORTNESS OF BREATH: 0
EXTREMITY NUMBNESS: 0
MUSCLE WEAKNESS: 0
SKIN CHANGES: 0
COUGH: 0
DOUBLE VISION: 0
SMELL DISTURBANCE: 0
SINUS PAIN: 0
SPUTUM PRODUCTION: 0
PALPITATIONS: 0
WEAKNESS: 0
NUMBNESS: 0
DECREASED APPETITE: 0
FEVER: 0
CHILLS: 0
FLANK PAIN: 0
NECK PAIN: 0
WEIGHT GAIN: 0
POLYPHAGIA: 0
HEMOPTYSIS: 0
COUGH DISTURBING SLEEP: 0
MYALGIAS: 0
HOARSE VOICE: 0
WEIGHT LOSS: 0
DECREASED LIBIDO: 0
ORTHOPNEA: 0
RESPIRATORY PAIN: 0
SINUS CONGESTION: 1
HEMATURIA: 0
MEMORY LOSS: 0
INSOMNIA: 0
HYPOTENSION: 0
LEG SWELLING: 0
HYPERTENSION: 0
EYE REDNESS: 0
RECTAL BLEEDING: 0
HALLUCINATIONS: 0
TASTE DISTURBANCE: 0
STIFFNESS: 0
DYSPNEA ON EXERTION: 0
BOWEL INCONTINENCE: 0
POLYDIPSIA: 0
PARALYSIS: 0
CONSTIPATION: 0
LOSS OF CONSCIOUSNESS: 0
BREAST MASS: 0
FATIGUE: 0
BREAST PAIN: 0
INCREASED ENERGY: 0
EXERCISE INTOLERANCE: 0
NERVOUS/ANXIOUS: 0
DEPRESSION: 0
BLOOD IN STOOL: 0
NECK MASS: 0

## 2018-11-08 ASSESSMENT — PAIN SCALES - GENERAL: PAINLEVEL: NO PAIN (0)

## 2018-11-08 NOTE — PROGRESS NOTES
Gynecologic Oncology Follow-Up Note    RE: Odalys Nathan  MRN: [id number]  : 1960  Date of Visit: 2018    CC: Odalys Nathan is a 58 year old year old female with recurrent stage IIIC bilateral ovarian cancer who presents today for disease management with weekly paclitaxel.    HPI: Odalys comes to the clinic accompanied by her  Marcos. She has overall been feeling well but continues to have issues with hypomagnesemia requiring twice weekly IV replacement. She does take magnesium oxide 400-800mg daily depending on the amount of diarrhea she has- typically has 2-3 episodes daily, sometimes up to 15 episodes. Takes loperamide rarely if she is not leaving the house. Asymptomatic from hypomagnesemia. Notes rare tingling in her fingertips, denies need for intervention. She continues on ferrous sulfate 325mg TID for history of anemia. She also continues tinnitus- denies need for intervention. Denies fevers, chills, RUQ pain, jaundice, or vomiting. She is eating well, currently drinking well (2L+ water daily), reports she is ready for chemotherapy today. Had a great time in [place] this past week during her week off. Does note occasional congestion and wheezing- previously had an albuterol inhaler prescribed and this was helpful, would like a refill of this. Currently mildly congested, feels she continues to improve. Denies any wheezing/SOB/GROVES/fevers.    Brief Oncology History:  2012 - Admitted to hospital for 2 weeks of intermittent abdominal cramping, distention, diarrhea and N/V. CT of abdomen/pelvis significant for small bowel obstruction, a heterogenous soft tissue density in the pelvis, omental nodules, and ascites. Bilateral adnexal masses per U/S of pelvis. CA-125 was elevated at 987, CEA was normal at 1.0.    12 - Therapeutic paracentesis (4 L) with cytology confirming malignancy (IN positive, weak ER positive, CK-7 positive consistent with GYN primary). Surgery  recommended d/t potential for falling blood counts 2/2 chemotherapy (patient is Judaism and limiting blood transfusion)    2/1/12 - Exploratory laparotomy, MAR BSO, lysis of adhesion, Appendectomy, Repair cystotomy, Omentectomy Ekyi-vnnjfm-zrhjrgryq-transverse-colon resection, Ileo-descending colon anastomosis, CUSA, & Colonoscopy (done by Dr. Amato and colorectal team).  2/20/2012 - Admitted to hospital for bilateral pulmonary emboli and drainage of pleural effusion. Started on Lovenox.  2/28/12-3/21/12: Cycle #1-2 Carbo/Taxol IV  3/29/12 - Started on Keflex by her PCP for infection in her healing wound (immediately below her umbilicus).    4/11/12-6/14/12: Cycle #3-6 IV chemo, Cycle #1 IV/IP chemo  7/16/12 -  8, CT PRADEEP - enrolled in  - observation arm  3/4/13-6/28/13:  6, 9, 12, 15, 20.  7/1/13: CT Chest, Abdomen, Pelvis IMPRESSION:  1. Worsening metastatic ovarian carcinoma suggested by increased size of soft tissue nodules anterior to the right psoas muscle, that may represent growing mesenteric lymphadenopathy.  2. Remaining prominent right lower quadrant mesenteric lymph nodes are not significantly changed from CT 7/16/2012.  3. Clustered nodular opacities in the right lower lobe are not significant change from 7/16/2012 and remain indeterminate. Again, the appearance and distribution is suggestive of an infectious etiology.  4. Stable 8 mm soft tissue nodule in left breast, unchanged since at least 02/20/2012. This can be observed on followup studies, but correlation with mammography could be considered.  Decision made to start Doxil/Carbo.  7/11/13: The left ventricular ejection fraction is normal at 66.4%.  7/12/13-9/6/13: Cycle #1-3 Doxil/Carbo.  10, 11.    9/30/13 CT C/A/P Impression:  1. Overall, favorable response to treatment with decreasing size of soft tissue nodules tracking along the anterior aspect of the right psoas muscle.  2. Continued thrombosis of the right  ovarian vein.  3. Improved cluster of right lower lobe pulmonary nodular opacities. These may represent resolving infection.  10/3/13-12/9/13:  9, 8, 10. Cycle 4-6 Doxil/Carbo.    1/13/14 CT C/A/P Impression:   1. Stable appearance of metastatic ovarian cancer. Scattered soft tissue nodules along the anterior aspect of the right psoas muscle are unchanged in size. Mild mesenteric lymphadenopathy is unchanged.  2. Clustered micronodules in the right lower lobe are unchanged from 9/30/2013, but improved from 7/1/2013. This history suggests a postinflammatory/postinfectious etiology.  3. 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  1/16/14 Discussed multiple options for her based on relatively stable-appearing disease on CT but slight increase in  (which has had small increase to 20 with last recurrence) including chemo break with recheck of  in 1 month, starting new chemo agent immediately, and exploratory surgery with possible resection of nodules. She is considered platinum-sensitive based on > 1 year remission after Taxol/Carbo, which we will take into consideration for future chemo planning. I am not inclined to surgery at this time given difficulty she already has with diarrhea secondary to past colon resection. I suspect we would need to resect further bowel due to mesenteric disease. Also explained inherent risks of any major surgery. Also mentioned maintenance chemo, but this has not been shown to increase overall survival and would likely decrease her quality of life without significant benefit. Family is going on vacation to [place] in 2 weeks and Odalys does not want to have chemo prior to that, so will plan to take 1 month break. She can have  at that time (discussed checking toady since last draw was in early December, but as it would likely not change treatment plan and she has h/o slow rising , will not check today).  2/17/14  16    2/20/14: Decision to take break  "from chemo for two months, followed by CT and CA-125.    4/21/14  27  4/21/14 CT C/A/P Impression:    1. Increased size of 2 low-attenuation lymph nodes anterior to the right psoas muscle is concerning for worsening metastatic ovarian cancer.    2. New circumferential thickening of a 3.8 cm length segment of distal transverse colon is likely physiologic. Recommend attention on followup imaging.    3. Grossly unchanged size of clustered small nodules versus scarring in the right lower lobe the lungs.    4. Stable thrombosis of the right ovarian vein.  5/14/14: Diagnostic laparoscopy converted to exploratory laparotomy and removal of mesenteric masses, tumor debulking, peritoneal biopsies and intraperitoneal port placement. On laparoscopy, it was noted that there were small nodules on the anterior abdominal wall near the previous incision, small nodules on the right pelvic sidewall as well. Nodules were palpated in the mesentery; however, as it was unable to clarify where the origin of the nodules was, the decision was made to open the patient. On opening there was found to be approximately a 3 cm nodule in the small bowel mesentery and another separate approximately 2 cm nodule in the bowel mesentery. Pelvis without evidence of cancer, some mesenteric lymph nodes were palpated. No evidence otherwise of any disseminated cancer throughout the abdomen.    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 (2/2)  -Largest metastasis: 1.3 cm  -See comment  D: Peritoneum, right paracolic gutter #2, biopsy:  -Fibroadipose tissue with granulomatous inflammation surrounding " "refractile material  -Negative for malignancy   E: Small bowel adhesion, biopsy:  -Fibroconnective tissue, consistent with adhesion  -Negative for malignancy  F: Lymph nodes, mesentry, not otherwise specified, excision:  -Two lymph nodes, negative for metastatic carcinoma (0/2)  G: Lymph node, mesentery, \"#2\", excision:  -One lymph node, negative for metastatic carcinoma (0/1)  H: Lymph nodes, mesentery, \"nodule #2\", excision:  -Five lymph nodes, negative for metastatic carcinoma (0/5)  COMMENT:  Some of the specimens show post-operative changes. Others show possible treatment related changes, including necrosis. The metastatic carcinoma in the mesenteric lymph nodes (specimen C) shows variable morphology, including relatively low grade tumor with papillary architecture, and high grade tumor comprised of nests of tumor cells with irregular, slit-like spaces and marked nuclear pleomorphism.    5/29/14: Cycle 1 IV PACLitaxel / IP CISplatin / IP PACLitaxel.  - 28.  6/26/14: Cycle #2 IV/IP.  10  8/5/14: CT chest/abd/pelvis IMPRESSION     1. In this patient with ovarian cancer, overall findings are indicative of stable/slight improvement, as multiple mesenteric lymphadenopathy and scattered nodular peritoneal soft tissue mass lesions appear unchanged or slightly smaller since 4/21/2014.    2. 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 Mild dilatation of the second and the third portion of the duodenum with a narrow SMA angle. This could represent SMA syndrome, if clinically correlated.17/14:    Cycle #3 IV/IP.  10.    CT chest/abd/pelvis with contrast on 8/5/14    Impression:    1. In this patient with ovarian cancer, overall findings are indicative of stable/slight improvement, as multiple mesenteric lymphadenopathy and scattered nodular peritoneal soft tissue mass lesions appear unchanged or slightly smaller since 4/21/2014.    2. 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 Mild dilatation of the second and the third portion of the duodenum with a narrow SMA angle. This could represent SMA syndrome, if clinically correlated.  8/7/14: Cycle #4 Taxol/Carbo (changed from IV/IP).  10. She has been feeling okay. She is unsure if she can finish out the course of 6 cycles IV/IP taxol/cisplatin. She feels like she has the flu for about a week then starts feeling gradually better after each chemo cycle. Her spouse notes that she actually has been more sick with the treatments than she initially admits here. She was also previously having some rib pain. Denies any rib pain now. Denies any chest pain or shortness of breath.  Plan: discussed recent CT cap results and switching to just IV as she is feeling miserable with IP treatments. Switch to IV carbo/taxol as patient is platinum sensitive.  We also discussed her taking part of the tesaro trial, which would require BRCA testing. She would like to take part in this trial if eligible.  8/20/14: Remove Intraperitoneal Port ( Port and catheter intact - discarded)  8/28/14: Cycle #5 Taxol/Carbo held due to thrombocytopenia.  6.    She denies any vaginal bleeding, no changes in her bowel or bladder habits, no nausea/emesis, no lower extremity edema, and no difficulties eating or sleeping. She denies any abdominal discomfort/bloating, no fevers or chills, and no chest pain or shortness of breath. She states her diarrhea is the same. She reports some fatigue which improves about 1-2 weeks after her chemotherapy. She states she does not need any medication refills and she was told she does not meet the criteria for the TESARO trial. She states she has 3 bags of iv fluids left over from her previous chemotherapy and will give these to herself. She states she is ready for her treatment today.    9/29/14: Cycle #6 Taxol/Carbo  6. Insurance questions regarding GSF coverage today. No concerns other than fatigue. Taking iron for anemia  and does not desire blood transfusion. Using neulasta for neutropenia. Using home IV hydration if needed. Baseline unchanged. No abdominal bloating, constipation, diarrhea, pain, vaginal or rectal bleeding, cough or dyspnea, fluid retention.    10/16/14: Impression:    1. Nodular peritoneal soft tissue mass in the right lower quadrant adjacent to the psoas muscle is no longer appreciated. Adjacent prominent lymphadenopathy is unchanged from previous exam. No new peritoneal lesions.    2. 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    10/20/14:  5. CT chest/abdomen/pelvis on 10/16/14 showed nodular peritoneal soft tissue mass in the right lower quadrant adjacent to the psoas muscle is no longer appreciated. Adjacent prominent lymphadenopathy is unchanged from previous exam. 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  1/27/15:  6.  4/28/15:  14.  5/26/15:  18.  6/2/15: CT cap Impression:  1. Postsurgical changes of hysterectomy and bilateral salpingo-oophorectomy for ovarian cancer. There is a new 8 mm hazy, ill-defined hypoattenuating lesion in hepatic segment 6 which is suspicious for a metastatic deposit. Further evaluation with ultrasound in recommended.    2. Increased size of a left retroperitoneal lymph node which is indeterminate but may represent a ciro metastasis. Mildly prominent lymph nodes in the right lower quadrant are not significantly changed.  3. Moderate colonic stool burden.    6/4/15: US abdomen IMPRESSION:    Hyperechoic lesion in the right hepatic lobe, consistent with hemangioma. This does not corresponds to the area of the lesion seen on CT from 6/2/2015. An MR would be helpful for identifying and characterizing the lesion from the recent CT.  6/12/15: MR abd IMPRESSION:  1. New 20 x 11 mm enhancing lesions between the right obliques, concerning for metastatic disease. This lesions should be amenable to  percutaneous biopsy, if indicated.  2. Correlating to the lesion visualized on comparison CT is a hepatic segment 6 subcapsular 7 mm lesion. Overall the appearance favors the diagnosis of a simple cyst. However, there is faint suggestion of mild peripheral arterial enhancement. Although this is favored as  artifactual, this should be followed up to confirm stability. Recommend 6 month followup.  3. Hepatic segment 6, 5 mm lesion too small to technically characterize. Differential would favor FNH, less likely flash filling hemangioma. Recommend attention on followup.  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     9/1/15: Cycle #1 Avastin/Cytoxan.   31.     9/24/15:feeling generally well. She says she has been having back and stomach spasms. She is taking cytosine daily (she ran out yesterday). She also says its affecting her voice. Admits that it burns occasionally. She is eating and drinking normal. She also admit diarrhea, 5-7 times daily, lose/watery. She trying to stay hydrated and eat fiber. She also says that her body is sore, especially the bottom of her feet. Her blood pressure is normal. She also admits having headache after her first infusion.      9/24/15: Cycle #2 Avastin/Cytoxan.  19.  10/15/15: Cycle #3 Avastin/Cytoxan.  16.     11/6/15: CT c/a/p IMPRESSION:    1. Stable postoperative change of MAR/BSO for ovarian cancer.  2. The lesion in the right flank abdominal musculature is slightly decreased in size. Otherwise, stable examination.  2. No evidence of metastatic disease in the chest.    11/20/15: Treatment planning visit,  16    11/25/15: surgical pathology report  FINAL DIAGNOSIS:  Soft tissue, right oblique muscle mass, excision:  -Recurrent ovarian serous carcinoma  -Carcinoma is present less than 1 mm from one resection  margin  -Background skeletal muscle and fibroadipose tissue    1/4/16:  22  1/11/16-1/27/16: Radiation to right flank x 12 treatments  4/7/2016:  94. CT cap IMPRESSION:    In this patient with ovarian cancer status post MAR/BSO and descending/transverse colectomy:  1. No evidence for malignancy in the chest, abdomen, or pelvis.  2. Stable small hypodense segment 6 liver lesion, appears more likely benign, possibly a cyst.  5/13/16:  124.  6/3/16: PET CT IMPRESSION: In this patient with a history of ovarian cancer:  1. Hypermetabolic and enlarging periaortic and perihepatic lymphadenopathy compatible with metastatic disease, as detailed above.  2. Although hypodense lesion in hepatic segment 6 has been present since 6/2/2015 associated hypermetabolism makes this lesion highly concerning for metastatic disease.    Plan: to start Niraparib under TESARO study.     6/9/16:  137.  6/14/16: Cycle #1 Niraparib.    6/28/16: Cycle #1 D15 Niraparib.    7/5/16:  100.    7/11/16: Cycle #2 Nraparib.   83.    8/3/2016: PET CT IMPRESSION:    In this patient with known history of ovarian cancer:  1) New pleural based nodular opacities in the lateral and inferior aspects of the bilateral lower lobes, worse in the left lung. Likely infection. Close follow up is recommended.      2) Slight decrease in hypermetabolic abdominal lymphadenopathy. 2 hypermetabolic lymph nodes persist.  3) Unchanged right hepatic lobe metastatic lesion.     8/9/16: Cycle #3 Niraparib.  69.  9/6/16: Cycle #4 Niraparib.  53. Dose held due to anemia.  9/13/16: Eval for potential cycle 4 niraparib. Dose held due to anemia.  10/4/16: CT CAP impression:  IMPRESSION: In this patient with a known history of ovarian cancer:  1. There has been interval resolution of pleural-based nodular  opacities which likely represented infection.  2. Abdominal lymphadenopathy in the form of 2 portacaval lymph nodes  have not  significantly changed in size, noted to be hypermetabolic on  prior PET/CT.  3. Previously demonstrated metastatic lesion in the right lobe of the  liver is not significantly changed.  10/11/16:  60  11/1/16: C6 niraparib,  75  11/29/16: C7 niraparib.  78. CT CAP impression as follows:  Target lesions (RECIST criteria):       A previously described target lesion superior to the head of the  pancreas (series 2, image 64)  (referred to as a perihepatic node on  6/3/2016) may not be a valid target lesion because it measured less  than 1.5 cm originally. However, this particular node has decreased in  size, now measuring 7 mm in short axis versus 14 mm on 6/3/2016 when  measured in a similar fashion.       2.3 cm short axis portacaval lymph node on series 2 image 67,  previously 2.0 cm on 10/4/2016.       1.2 cm subtle hypodensity in hepatic segment 6 on series 2 image 75,  stable on multiple studies since at least 6/3/2016     Sum of diameters today: 3.5 cm. Sum of diameters 10/4/2016: 3.2 cm.  Growth = 9%.    12/27/16: C8 niraparib.  105.  1/25/17: C9 niraparib.  108.  2/23/17: C10 niraparib.  132.   CT CAP impression:  Sum of target lesion diameters today: 3.7 cm. Sum of target lesion  diameters on 11/28/2016: 3.5 cm. Growth= 6%  1. In this patient with history of ovarian cancer there is stable  disease by RECIST criteria as evidenced by:   1a. Mildly increased size of liver metastasis.  1b. Stable portacaval lymphadenopathy.  1c. No evidence of metastatic disease in the chest.  2. Trace emphysematous changes of the lungs.  3/22/17: C11 niraparib.  132.  4/19/17: C12 niraparib.  127.  5/16/17: CT CAP IMPRESSION: In this patient with ovarian cancer:  1. Mildly increased size of hepatic metastasis segment 6 with subtle increased capsular retraction.  2. Stable edmundo hepatis nodes, with mild increase in size of periaortic lymph nodes.  3. Prominent left supraclavicular  lymph node with subtle increase in size compared to prior studies, particularly comparing to 10/4/2016.  4. Mild subtle groundglass opacities in the right upper lobe, not present on prior study, lesser extent in the right lower lobe.  Findings may represent infection, additional consideration is malignancy (less likely), and attention on follow-up study  Recommended.  Addendum:   Prominent left supraclavicular lymph node (3/25) is stable from most recent CT performed 2/20/2017, currently measuring 14 x 16 mm, previously 14 x 16 mm on 2/20/2017.      The portal caval lymph node/edmundo hepatis lymph node is stable from 2/20/2017, measuring 21 mm.      Clarification of size of the para-aortic lymph node (series 3 image 327). It short axis measurement is 11 mm versus 9 mm on prior study.      Hepatic segment 6 triangular-shaped low density lesion (3/362) measures 14 mm, previously measured 12 mm, demonstrating a  possible/questionable minimal subtle increase in size. Similarly the lymph nodes noted above in the supraclavicular and para-aortic regions demonstrate possible minimal possible subtle increase in size.      5/18/17: Cycle #13 Niraparib.  191.  6/15/17: Cycle #14 Niraparib.  146.  7/13/17: Cycle #15 Niraparib 200 mg.  171     CT (8/9/17):       IMPRESSION:  1. Segment 6 hepatic metastasis is stable to minimally increased in  size.  2. Slight increase in multicentric adenopathy, most pronounced at the  edmundo hepatis. Additional sites include the inferior left  neck/supraclavicular region and retroperitoneum which appear stable to  minimally increased.      8/10/17:  175  9/14/17: MUGA LVEF 54%  9/22/17: C1D1 carboplatin/Doxil.  187.  10/19/17: C2D1 carboplatin/Doxil.  108.  11/17/17: C3D1 carboplatin/Doxil.  82.  12/14/17: C4D1 carboplatin/Doxil.  83.  1/12/18: C5D1 carboplatin/Doxil.  80.  2/9/18: C6D1 carboplatin/Doxil.  71.  3/2/18:  49. Three  month treatment break.  6/20/2018:  170. CT CAP:  IMPRESSION: In this patient with history of ovarian cancer:  1. Increased size of a right hepatic lobe lesion and numerous edmundo  hepatis and retroperitoneal lymph nodes compatible with progression of  metastatic disease.  2. New mild intrahepatic biliary ductal dilatation, periportal edema,  and pericholecystic fluid. Increased soft tissue fullness in the edmundo  hepatis in the expected location of the common hepatic duct. Findings  are suspicious for developing biliary ductal obstruction secondary to  worsening metastatic disease in the edmundo hepatis. Correlation with  liver function tests is recommended. Right upper quadrant ultrasound  may be beneficial.  3. Unchanged left supraclavicular and right hilar lymphadenopathy in  the chest.     8/3/18: C1D1 weekly paclitaxel.  not done.    9/20/18: C2D1 weekly paclitaxel 80mg/m2.  56.    11/8/18: C3D1 weekly paclitaxel 80mg/m2.  pending.    Past Medical History:   Diagnosis Date     Antiplatelet or antithrombotic long-term use      Ascites      Blood clot in the legs      Diabetes (H)      Ovarian cancer (H)     serous,stg IV     Pleural effusion      Pulmonary embolism (H) 2/2012     Refusal of blood transfusions as patient is Mandaeism      Short gut syndrome      Subclinical hypothyroidism 4/18/2013     Thrombosis of leg        Past Surgical History:   Procedure Laterality Date     COLECTOMY       COLONOSCOPY  2/1/2012    Procedure:COLONOSCOPY; With Biopsy; Surgeon:TONI SULLIVAN; Location:[facility]     HYSTERECTOMY TOTAL ABD, RHIANNA SALPINGO-OOPHORECTOMY, NODE DISSECTION, TUMOR DEBULKING, COMBINED  2/1/2012    Procedure:COMBINED HYSTERECTOMY TOTAL ABDOMINAL, BILATERAL SALPINGO-OOPHORECTOMY, NODE DISSECTION, TUMOR DEBULKING;  Exploratory Laparotomy, Total Abdominal Hysterectomy, Bilateral Salpingo-Oophorectomy, appendectomy,lysis of adhesions, ileal, ascending, transverse and splenic  flexure resection, ileal descending bowel renanastomosis, incidental cystotomy repair, CUSA procedure and colonoscopy ; [facility]     INSERT PORT PERITONEAL ACCESS  4/3/2012    Procedure:INSERT PORT PERITONEAL ACCESS; Intraperitoneal Port Placement (c-arm); Surgeon:SAMUEL CARRASCO; Location:[facility]     INSERT PORT PERITONEAL ACCESS  5/14/2014    Procedure: INSERT PORT PERITONEAL ACCESS;  Surgeon: Nga Yeung MD;  Location: [facility] OR     INSERT PORT VASCULAR ACCESS       LAPAROSCOPY DIAGNOSTIC (GYN)  5/14/2014    Procedure: LAPAROSCOPY DIAGNOSTIC (GYN);  Surgeon: Nga Yeung MD;  Location: [facility]     LAPAROTOMY EXPLORATORY Right 11/25/2015    Procedure: LAPAROTOMY EXPLORATORY;  Surgeon: Nga Yeung MD;  Location: [facility]     LAPAROTOMY, TUMOR DEBULKING, COMBINED  5/14/2014    Procedure: COMBINED LAPAROTOMY, TUMOR DEBULKING;  Surgeon: Nga Yeung MD;  Location: [facility]     REMOVE CATHETER PERITONEAL N/A 8/20/2014    Procedure: REMOVE CATHETER PERITONEAL;  Surgeon: Nga Yeung MD;  Location: [facility] OR     VASCULAR SURGERY      stent left iliac vein       Current Outpatient Prescriptions   Medication     Ascorbic Acid (VITAMIN C PO)     Calcium Carbonate-Vitamin D (CALCIUM + D PO)     cyanocobalamin (VITAMIN B12) 1000 MCG/ML injection     diphenoxylate-atropine (LOMOTIL) 2.5-0.025 MG per tablet     Ferrous Sulfate 324 (65 Fe) MG TBEC     HERBALS     LEVOTHYROXINE SODIUM PO     loperamide (IMODIUM) 2 MG capsule     LORazepam (ATIVAN) 1 MG tablet     magnesium oxide (MAG-OX) 400 MG tablet     omeprazole (PRILOSEC) 20 MG CR capsule     order for DME     potassium chloride (KLOR-CON) 20 MEQ packet     VITAMIN E NATURAL PO     No current facility-administered medications for this visit.      Facility-Administered Medications Ordered in Other Visits   Medication     heparin 100 UNIT/ML injection 500 Units          No Known Allergies    Family History   Problem Relation Age of Onset     Cancer  Mother 69     lung, smoker     Cancer Maternal Uncle 65     brain     Colon Cancer Maternal Aunt 80     colon       Social History     Social History     Marital status:      Spouse name: N/A     Number of children: N/A     Years of education: N/A     Occupational History     Not on file.     Social History Main Topics     Smoking status: Former Smoker     Years: 8.00     Types: Cigarettes     Quit date: 6/21/1980     Smokeless tobacco: Never Used      Comment: started at 11 yo and quit at 16 yo     Alcohol use Yes      Comment: 3x/day wine or jodi     Drug use: No     Sexual activity: Not on file     Other Topics Concern     Not on file     Social History Narrative       Review of Systems     Constitutional:  Negative for fever, chills, weight loss, weight gain, fatigue, decreased appetite, night sweats, recent stressors, height gain, height loss, post-operative complications, incisional pain, hallucinations, increased energy, hyperactivity and confused.   HENT:  Positive for sinus congestion. Negative for ear pain, hearing loss, tinnitus, nosebleeds, trouble swallowing, hoarse voice, mouth sores, sore throat, ear discharge, tooth pain, gum tenderness, taste disturbance, smell disturbance, hearing aid, bleeding gums, dry mouth, sinus pain and neck mass.    Eyes:  Negative for double vision, pain, redness, eye pain, decreased vision, eye watering, eye bulging, eye dryness, flashing lights, spots, floaters, strabismus, tunnel vision, jaundice and eye irritation.   Respiratory:   Positive for wheezing (rare, not currently experiencing). Negative for cough, hemoptysis, sputum production, shortness of breath, sleep disturbances due to breathing, snores loudly, respiratory pain, dyspnea on exertion, cough disturbing sleep and postural dyspnea.    Cardiovascular:  Negative for chest pain, dyspnea on exertion, palpitations, orthopnea, claudication, leg swelling, fingers/toes turn blue, hypertension, hypotension,  syncope, history of heart murmur, chest pain on exertion, chest pain at rest, pacemaker, few scattered varicosities, leg pain, sleep disturbances due to breathing, tachycardia, light-headedness, exercise intolerance and edema.   Gastrointestinal:  Positive for diarrhea. Negative for heartburn, nausea, vomiting, abdominal pain, constipation, blood in stool, melena, rectal pain, bloating, hemorrhoids, bowel incontinence, jaundice, rectal bleeding, coffee ground emesis and change in stool.   Genitourinary:  Negative for bladder incontinence, dysuria, urgency, hematuria, flank pain, vaginal discharge, difficulty urinating, genital sores, dyspareunia, decreased libido, nocturia, voiding less frequently, arousal difficulty, abnormal vaginal bleeding, excessive menstruation, menstrual changes, hot flashes, vaginal dryness and postmenopausal bleeding.   Musculoskeletal:  Negative for myalgias, back pain, joint swelling, arthralgias, stiffness, muscle cramps, neck pain, bone pain, muscle weakness and fracture.   Skin:  Negative for nail changes, itching, poor wound healing, rash, hair changes, skin changes, acne, warts, poor wound healing, scarring, flaky skin, Raynaud's phenomenon, sensitivity to sunlight and skin thickening.   Neurological:  Positive for tingling. Negative for dizziness, tremors, speech change, seizures, loss of consciousness, weakness, light-headedness, numbness, headaches, disturbances in coordination, extremity numbness, memory loss, difficulty walking and paralysis.   Endo/Heme:  Positive for anemia. Negative for swollen glands and bruises/bleeds easily.   Psychiatric/Behavioral:  Negative for depression, hallucinations, memory loss, decreased concentration, mood swings and panic attacks.    Breast:  Negative for breast discharge, breast mass, breast pain and nipple retraction.   Endocrine:  Negative for altered temperature regulation, polyphagia, polydipsia, unwanted hair growth and change in facial  "hair.        Physical Exam:    /68  Pulse 92  Temp 98.2  F (36.8  C) (Tympanic)  Resp 16  Ht 1.651 m (5' 5\")  Wt 65.3 kg (144 lb)  SpO2 97%  BMI 23.96 kg/m2    General: Alert non-toxic appearing female in no acute distress  HEENT: Normocephalic, atraumatic; PERRLA; no scleral icterus; anicteric sclera; oropharynx pink without lesions; neck supple  Pulmonary: Lungs clear to auscultation, no increased work of breathing noted  Cardiac: Regular rate and rhythm, S1S2, no clicks, murmurs, rubs, or gallops; bilateral lower extremities without edema  GI: Normoactive bowel sounds x4 quadrants, abdomen soft, non-distended, and non-tender to palpation without masses or organomegaly  : Not indicated  Heme: Cervical, supraclavicular, and inguinal nodes without lymphadenopathy  MSK: Moves all extremities, no obvious muscle wasting  Neuro: No gross deficits, normal gait  Skin: Appropriate color for race, warm and dry, no rashes or lesions to unclothed skin  Psych: Pleasant and interactive, affect bright, makes appropriate eye contact, thought process linear    Labs:   11/8/2018  Day 1   Hemoglobin 11.7 - 15.7 g/dL 12.6   Hematocrit 35.0 - 47.0 % 38.4   Platelet Count 150 - 450 10e9/L 272   Absolute Neutrophil 1.6 - 8.3 10e9/L 5.5   Sodium 133 - 144 mmol/L 137   Potassium 3.4 - 5.3 mmol/L 4.1   Chloride 94 - 109 mmol/L 103   Carbon Dioxide 20 - 32 mmol/L 26   Urea Nitrogen 7 - 30 mg/dL 13   Creatinine 0.52 - 1.04 mg/dL 0.78   Calcium 8.5 - 10.1 mg/dL 9.3   Magnesium 1.6 - 2.3 mg/dL 1.2 (A)   Bilirubin Total 0.2 - 1.3 mg/dL 0.3   ALT 0 - 50 U/L 26   AST 0 - 45 U/L 21   Alkaline Phosphatase 40 - 150 U/L 164 (A)   Albumin 3.4 - 5.0 g/dL 3.6   Protein Total 6.8 - 8.8 g/dL 7.1   WBC 4.0 - 11.0 10e9/L 10.0    pending    Assessment/Plan:  1) Recurrent stage IIIC bilateral ovarian cancer: Currently feeling well and feels that weekly paclitaxel is tolerable, offering a good quality of life at the moment. Proceed with " C3D1 weekly paclitaxel 80mg/m2 as originally ordered by Dr. Yeung. To follow up in clinic for CT and treatment planning with Dr. Yeung in seven weeks- to call clinic in the interim with concerns. Alkaline phosphatase continues to improve and ALT/AST normalized. Most likely related to her disease given this was elevated prior to starting paclitaxel. Continue to monitor- reviewed red flags including RUQ pain, fever, chills, vomiting, and jaundice and when to seek further care. Magnesium to be replaced intravenously in clinic twice weekly, continue home supplementation with magnesium oxide 400mg PO BID as tolerated. Continue loperamide for diarrhea, encouraged to take on a more regular basis so that she is not having more than 1-2 loose stools daily as this is where she may be losing magnesium. Reviewed s/sxs hypomagnesemia and when to seek further care. Denies need for intervention with neuropathy. Reviewed signs and symptoms for when she should contact the clinic or seek additional care, including but not limited to fever, chills, inability to keep down food or fluids, nausea and vomiting not controlled with antiemetics, and diarrhea leading to dehydration. Patient to contact the clinic with any questions or concerns in the interim.   2) Genetic counseling: Testing has been performed and she is negative for mutations in BRCA1, BRCA2, EPCAM, MLH1, MSH2, MSH6, PMS2, PTEN, and TP53 genes  3) Health maintenance issues discussed include to continue following up with PCP for non-gynecologic concerns. Albuterol inhaler refilled for rare episodes of wheezing.  4) Patient verbalized understanding of and agreement with plan    A total of 20 minutes spent with patient, over 50% spent in counseling and coordination of care.    HAILE De Paz, FNP-C  Family Nurse Practitioner  Gynecologic Oncology  [facility]  Pager: [phone]

## 2018-11-08 NOTE — LETTER
2018         RE: Odalys Nathan  [id number]  [facility] [address]        Dear Colleague,    Thank you for referring your patient, Odalys Nathan, to the [facility]. Please see a copy of my visit note below.    Gynecologic Oncology Follow-Up Note    RE: Odalys Nathan  MRN: [id number]  : 1960  Date of Visit: 2018    CC: Odalys Nathan is a 58 year old year old female with recurrent stage IIIC bilateral ovarian cancer who presents today for disease management with weekly paclitaxel.    HPI: Odalys comes to the clinic accompanied by her  Marcos. She has overall been feeling well but continues to have issues with hypomagnesemia requiring twice weekly IV replacement. She does take magnesium oxide 400-800mg daily depending on the amount of diarrhea she has- typically has 2-3 episodes daily, sometimes up to 15 episodes. Takes loperamide rarely if she is not leaving the house. Asymptomatic from hypomagnesemia. Notes rare tingling in her fingertips, denies need for intervention. She continues on ferrous sulfate 325mg TID for history of anemia. She also continues tinnitus- denies need for intervention. Denies fevers, chills, RUQ pain, jaundice, or vomiting. She is eating well, currently drinking well (2L+ water daily), reports she is ready for chemotherapy today. Had a great time in [place] this past week during her week off. Does note occasional congestion and wheezing- previously had an albuterol inhaler prescribed and this was helpful, would like a refill of this. Currently mildly congested, feels she continues to improve. Denies any wheezing/SOB/GROVES/fevers.    Brief Oncology History:  2012 - Admitted to hospital for 2 weeks of intermittent abdominal cramping, distention, diarrhea and N/V. CT of abdomen/pelvis significant for small bowel obstruction, a heterogenous soft tissue density in the pelvis, omental nodules, and ascites.  Bilateral adnexal masses per U/S of pelvis. CA-125 was elevated at 987, CEA was normal at 1.0.    1/18/12 - Therapeutic paracentesis (4 L) with cytology confirming malignancy (HI positive, weak ER positive, CK-7 positive consistent with GYN primary). Surgery recommended d/t potential for falling blood counts 2/2 chemotherapy (patient is Religious and limiting blood transfusion)    2/1/12 - Exploratory laparotomy, MAR BSO, lysis of adhesion, Appendectomy, Repair cystotomy, Omentectomy Hvmx-zzdjjr-rgikhjsaq-transverse-colon resection, Ileo-descending colon anastomosis, CUSA, & Colonoscopy (done by Dr. Amato and colorectal team).  2/20/2012 - Admitted to hospital for bilateral pulmonary emboli and drainage of pleural effusion. Started on Lovenox.  2/28/12-3/21/12: Cycle #1-2 Carbo/Taxol IV  3/29/12 - Started on Keflex by her PCP for infection in her healing wound (immediately below her umbilicus).    4/11/12-6/14/12: Cycle #3-6 IV chemo, Cycle #1 IV/IP chemo  7/16/12 -  8, CT PRADEEP - enrolled in  - observation arm  3/4/13-6/28/13:  6, 9, 12, 15, 20.  7/1/13: CT Chest, Abdomen, Pelvis IMPRESSION:  1. Worsening metastatic ovarian carcinoma suggested by increased size of soft tissue nodules anterior to the right psoas muscle, that may represent growing mesenteric lymphadenopathy.  2. Remaining prominent right lower quadrant mesenteric lymph nodes are not significantly changed from CT 7/16/2012.  3. Clustered nodular opacities in the right lower lobe are not significant change from 7/16/2012 and remain indeterminate. Again, the appearance and distribution is suggestive of an infectious etiology.  4. Stable 8 mm soft tissue nodule in left breast, unchanged since at least 02/20/2012. This can be observed on followup studies, but correlation with mammography could be considered.  Decision made to start Doxil/Carbo.  7/11/13: The left ventricular ejection fraction is normal at 66.4%.  7/12/13-9/6/13:  Cycle #1-3 Doxil/Carbo.  10, 11.    9/30/13 CT C/A/P Impression:  1. Overall, favorable response to treatment with decreasing size of soft tissue nodules tracking along the anterior aspect of the right psoas muscle.  2. 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  3. Improved cluster of right lower lobe pulmonary nodular opacities. These may represent resolving infection.  10/3/13-12/9/13:  9, 8, 10. Cycle 4-6 Doxil/Carbo.    1/13/14 CT C/A/P Impression:   1. Stable appearance of metastatic ovarian cancer. Scattered soft tissue nodules along the anterior aspect of the right psoas muscle are unchanged in size. Mild mesenteric lymphadenopathy is unchanged.  2. Clustered micronodules in the right lower lobe are unchanged from 9/30/2013, but improved from 7/1/2013. This history suggests a postinflammatory/postinfectious etiology.  3. 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  1/16/14 Discussed multiple options for her based on relatively stable-appearing disease on CT but slight increase in  (which has had small increase to 20 with last recurrence) including chemo break with recheck of  in 1 month, starting new chemo agent immediately, and exploratory surgery with possible resection of nodules. She is considered platinum-sensitive based on > 1 year remission after Taxol/Carbo, which we will take into consideration for future chemo planning. I am not inclined to surgery at this time given difficulty she already has with diarrhea secondary to past colon resection. I suspect we would need to resect further bowel due to mesenteric disease. Also explained inherent risks of any major surgery. Also mentioned maintenance chemo, but this has not been shown to increase overall survival and would likely decrease her quality of life without significant benefit. Family is going on vacation to [place] in 2 weeks and Odalys does not want to have chemo prior to that, so will plan to take 1 month break.  "She can have  at that time (discussed checking toady since last draw was in early December, but as it would likely not change treatment plan and she has h/o slow rising , will not check today).  2/17/14  16    2/20/14: Decision to take break from chemo for two months, followed by CT and CA-125.    4/21/14  27  4/21/14 CT C/A/P Impression:    1. Increased size of 2 low-attenuation lymph nodes anterior to the right psoas muscle is concerning for worsening metastatic ovarian cancer.    2. New circumferential thickening of a 3.8 cm length segment of distal transverse colon is likely physiologic. Recommend attention on followup imaging.    3. Grossly unchanged size of clustered small nodules versus scarring in the right lower lobe the lungs.    4. Stable thrombosis of the right ovarian vein.  5/14/14: Diagnostic laparoscopy converted to exploratory laparotomy and removal of mesenteric masses, tumor debulking, peritoneal biopsies and intraperitoneal port placement. On laparoscopy, it was noted that there were small nodules on the anterior abdominal wall near the previous incision, small nodules on the right pelvic sidewall as well. Nodules were palpated in the mesentery; however, as it was unable to clarify where the origin of the nodules was, the decision was made to open the patient. On opening there was found to be approximately a 3 cm nodule in the small bowel mesentery and another separate approximately 2 cm nodule in the bowel mesentery. Pelvis without evidence of cancer, some mesenteric lymph nodes were palpated. No evidence otherwise of any disseminated cancer throughout the abdomen.    FINAL DIAGNOSIS:  A: Peritoneum, right paracolic gutter, biopsy:  -Necrotic tissue  -No viable tumor present  B: Soft tissue, anterior abdominal wall nodule, biopsy:  -Fibroadipose tissue with abundant macrophages, fibrosis and calcifications  -Negative for malignancy   C: Lymph nodes, mesentery, \"nodule\", " "excision:  -Metastatic/recurrent high grade serous carcinoma in two of two lymph nodes (2/2)  -Largest metastasis: 1.3 cm  -See comment  D: Peritoneum, right paracolic gutter #2, biopsy:  -Fibroadipose tissue with granulomatous inflammation surrounding refractile material  -Negative for malignancy   E: Small bowel adhesion, biopsy:  -Fibroconnective tissue, consistent with adhesion  -Negative for malignancy  F: Lymph nodes, mesentry, not otherwise specified, excision:  -Two lymph nodes, negative for metastatic carcinoma (0/2)  G: Lymph node, mesentery, \"#2\", excision:  -One lymph node, negative for metastatic carcinoma (0/1)  H: Lymph nodes, mesentery, \"nodule #2\", excision:  -Five lymph nodes, negative for metastatic carcinoma (0/5)  COMMENT:  Some of the specimens show post-operative changes. Others show possible treatment related changes, including necrosis. The metastatic carcinoma in the mesenteric lymph nodes (specimen C) shows variable morphology, including relatively low grade tumor with papillary architecture, and high grade tumor comprised of nests of tumor cells with irregular, slit-like spaces and marked nuclear pleomorphism.    5/29/14: Cycle 1 IV PACLitaxel / IP CISplatin / IP PACLitaxel.  - 28.  6/26/14: Cycle #2 IV/IP.  10  8/5/14: CT chest/abd/pelvis IMPRESSION     1. In this patient with ovarian cancer, overall findings are indicative of stable/slight improvement, as multiple mesenteric lymphadenopathy and scattered nodular peritoneal soft tissue mass lesions appear unchanged or slightly smaller since 4/21/2014.    2. 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 Mild dilatation of the second and the third portion of the duodenum with a narrow SMA angle. This could represent SMA syndrome, if clinically correlated.17/14:    Cycle #3 IV/IP.  10.    CT chest/abd/pelvis with contrast on 8/5/14    Impression:    1. In this patient with ovarian cancer, overall findings are " indicative of stable/slight improvement, as multiple mesenteric lymphadenopathy and scattered nodular peritoneal soft tissue mass lesions appear unchanged or slightly smaller since 4/21/2014.    2. 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 Mild dilatation of the second and the third portion of the duodenum with a narrow SMA angle. This could represent SMA syndrome, if clinically correlated.  8/7/14: Cycle #4 Taxol/Carbo (changed from IV/IP).  10. She has been feeling okay. She is unsure if she can finish out the course of 6 cycles IV/IP taxol/cisplatin. She feels like she has the flu for about a week then starts feeling gradually better after each chemo cycle. Her spouse notes that she actually has been more sick with the treatments than she initially admits here. She was also previously having some rib pain. Denies any rib pain now. Denies any chest pain or shortness of breath.  Plan: discussed recent CT cap results and switching to just IV as she is feeling miserable with IP treatments. Switch to IV carbo/taxol as patient is platinum sensitive.  We also discussed her taking part of the tesaro trial, which would require BRCA testing. She would like to take part in this trial if eligible.  8/20/14: Remove Intraperitoneal Port ( Port and catheter intact - discarded)  8/28/14: Cycle #5 Taxol/Carbo held due to thrombocytopenia.  6.    She denies any vaginal bleeding, no changes in her bowel or bladder habits, no nausea/emesis, no lower extremity edema, and no difficulties eating or sleeping. She denies any abdominal discomfort/bloating, no fevers or chills, and no chest pain or shortness of breath. She states her diarrhea is the same. She reports some fatigue which improves about 1-2 weeks after her chemotherapy. She states she does not need any medication refills and she was told she does not meet the criteria for the TESARO trial. She states she has 3 bags of iv fluids left over from  her previous chemotherapy and will give these to herself. She states she is ready for her treatment today.    9/29/14: Cycle #6 Taxol/Carbo  6. Insurance questions regarding GSF coverage today. No concerns other than fatigue. Taking iron for anemia and does not desire blood transfusion. Using neulasta for neutropenia. Using home IV hydration if needed. Baseline unchanged. No abdominal bloating, constipation, diarrhea, pain, vaginal or rectal bleeding, cough or dyspnea, fluid retention.    10/16/14: Impression:    1. Nodular peritoneal soft tissue mass in the right lower quadrant adjacent to the psoas muscle is no longer appreciated. Adjacent prominent lymphadenopathy is unchanged from previous exam. No new peritoneal lesions.    2. 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    10/20/14:  5. CT chest/abdomen/pelvis on 10/16/14 showed nodular peritoneal soft tissue mass in the right lower quadrant adjacent to the psoas muscle is no longer appreciated. Adjacent prominent lymphadenopathy is unchanged from previous exam. 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  1/27/15:  6.  4/28/15:  14.  5/26/15:  18.  6/2/15: CT cap Impression:  1. Postsurgical changes of hysterectomy and bilateral salpingo-oophorectomy for ovarian cancer. There is a new 8 mm hazy, ill-defined hypoattenuating lesion in hepatic segment 6 which is suspicious for a metastatic deposit. Further evaluation with ultrasound in recommended.    2. Increased size of a left retroperitoneal lymph node which is indeterminate but may represent a ciro metastasis. Mildly prominent lymph nodes in the right lower quadrant are not significantly changed.  3. Moderate colonic stool burden.    6/4/15: US abdomen IMPRESSION:    Hyperechoic lesion in the right hepatic lobe, consistent with hemangioma. This does not corresponds to the area of the lesion seen on CT from 6/2/2015. An MR  would be helpful for identifying and characterizing the lesion from the recent CT.  6/12/15: MR abd IMPRESSION:  1. New 20 x 11 mm enhancing lesions between the right obliques, concerning for metastatic disease. This lesions should be amenable to percutaneous biopsy, if indicated.  2. Correlating to the lesion visualized on comparison CT is a hepatic segment 6 subcapsular 7 mm lesion. Overall the appearance favors the diagnosis of a simple cyst. However, there is faint suggestion of mild peripheral arterial enhancement. Although this is favored as  artifactual, this should be followed up to confirm stability. Recommend 6 month followup.  3. Hepatic segment 6, 5 mm lesion too small to technically characterize. Differential would favor FNH, less likely flash filling hemangioma. Recommend attention on followup.  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     9/1/15: Cycle #1 Avastin/Cytoxan.   31.     9/24/15:feeling generally well. She says she has been having back and stomach spasms. She is taking cytosine daily (she ran out yesterday). She also says its affecting her voice. Admits that it burns occasionally. She is eating and drinking normal. She also admit diarrhea, 5-7 times daily, lose/watery. She trying to stay hydrated and eat fiber. She also says that her body is sore, especially the bottom of her feet. Her blood pressure is normal. She also admits having headache after her first infusion.      9/24/15: Cycle #2 Avastin/Cytoxan.  19.  10/15/15: Cycle #3 Avastin/Cytoxan.  16.     11/6/15: CT c/a/p IMPRESSION:    1. Stable postoperative change of MAR/BSO for ovarian cancer.  2. The lesion in the right flank abdominal musculature is slightly decreased in size. Otherwise, stable examination.  2. No evidence of metastatic disease in the chest.    11/20/15: Treatment  planning visit,  16    11/25/15: surgical pathology report  FINAL DIAGNOSIS:  Soft tissue, right oblique muscle mass, excision:  -Recurrent ovarian serous carcinoma  -Carcinoma is present less than 1 mm from one resection margin  -Background skeletal muscle and fibroadipose tissue    1/4/16:  22  1/11/16-1/27/16: Radiation to right flank x 12 treatments  4/7/2016:  94. CT cap IMPRESSION:    In this patient with ovarian cancer status post MAR/BSO and descending/transverse colectomy:  1. No evidence for malignancy in the chest, abdomen, or pelvis.  2. Stable small hypodense segment 6 liver lesion, appears more likely benign, possibly a cyst.  5/13/16:  124.  6/3/16: PET CT IMPRESSION: In this patient with a history of ovarian cancer:  1. Hypermetabolic and enlarging periaortic and perihepatic lymphadenopathy compatible with metastatic disease, as detailed above.  2. Although hypodense lesion in hepatic segment 6 has been present since 6/2/2015 associated hypermetabolism makes this lesion highly concerning for metastatic disease.    Plan: to start Niraparib under TESARO study.     6/9/16:  137.  6/14/16: Cycle #1 Niraparib.    6/28/16: Cycle #1 D15 Niraparib.    7/5/16:  100.    7/11/16: Cycle #2 Nraparib.   83.    8/3/2016: PET CT IMPRESSION:    In this patient with known history of ovarian cancer:  1) New pleural based nodular opacities in the lateral and inferior aspects of the bilateral lower lobes, worse in the left lung. Likely infection. Close follow up is recommended.      2) Slight decrease in hypermetabolic abdominal lymphadenopathy. 2 hypermetabolic lymph nodes persist.  3) Unchanged right hepatic lobe metastatic lesion.     8/9/16: Cycle #3 Niraparib.  69.  9/6/16: Cycle #4 Niraparib.  53. Dose held due to anemia.  9/13/16: Eval for potential cycle 4 niraparib. Dose held due to anemia.  10/4/16: CT CAP impression:  IMPRESSION: In this patient with a  known history of ovarian cancer:  1. There has been interval resolution of pleural-based nodular  opacities which likely represented infection.  2. Abdominal lymphadenopathy in the form of 2 portacaval lymph nodes  have not significantly changed in size, noted to be hypermetabolic on  prior PET/CT.  3. Previously demonstrated metastatic lesion in the right lobe of the  liver is not significantly changed.  10/11/16:  60  11/1/16: C6 niraparib,  75  11/29/16: C7 niraparib.  78. CT CAP impression as follows:  Target lesions (RECIST criteria):       A previously described target lesion superior to the head of the  pancreas (series 2, image 64)  (referred to as a perihepatic node on  6/3/2016) may not be a valid target lesion because it measured less  than 1.5 cm originally. However, this particular node has decreased in  size, now measuring 7 mm in short axis versus 14 mm on 6/3/2016 when  measured in a similar fashion.       2.3 cm short axis portacaval lymph node on series 2 image 67,  previously 2.0 cm on 10/4/2016.       1.2 cm subtle hypodensity in hepatic segment 6 on series 2 image 75,  stable on multiple studies since at least 6/3/2016     Sum of diameters today: 3.5 cm. Sum of diameters 10/4/2016: 3.2 cm.  Growth = 9%.    12/27/16: C8 niraparib.  105.  1/25/17: C9 niraparib.  108.  2/23/17: C10 niraparib.  132.   CT CAP impression:  Sum of target lesion diameters today: 3.7 cm. Sum of target lesion  diameters on 11/28/2016: 3.5 cm. Growth= 6%  1. In this patient with history of ovarian cancer there is stable  disease by RECIST criteria as evidenced by:   1a. Mildly increased size of liver metastasis.  1b. Stable portacaval lymphadenopathy.  1c. No evidence of metastatic disease in the chest.  2. Trace emphysematous changes of the lungs.  3/22/17: C11 niraparib.  132.  4/19/17: C12 niraparib.  127.  5/16/17: CT CAP IMPRESSION: In this patient with ovarian  cancer:  1. Mildly increased size of hepatic metastasis segment 6 with subtle increased capsular retraction.  2. Stable edmundo hepatis nodes, with mild increase in size of periaortic lymph nodes.  3. Prominent left supraclavicular lymph node with subtle increase in size compared to prior studies, particularly comparing to 10/4/2016.  4. Mild subtle groundglass opacities in the right upper lobe, not present on prior study, lesser extent in the right lower lobe.  Findings may represent infection, additional consideration is malignancy (less likely), and attention on follow-up study  Recommended.  Addendum:   Prominent left supraclavicular lymph node (3/25) is stable from most recent CT performed 2/20/2017, currently measuring 14 x 16 mm, previously 14 x 16 mm on 2/20/2017.      The portal caval lymph node/edmundo hepatis lymph node is stable from 2/20/2017, measuring 21 mm.      Clarification of size of the para-aortic lymph node (series 3 image 327). It short axis measurement is 11 mm versus 9 mm on prior study.      Hepatic segment 6 triangular-shaped low density lesion (3/362) measures 14 mm, previously measured 12 mm, demonstrating a  possible/questionable minimal subtle increase in size. Similarly the lymph nodes noted above in the supraclavicular and para-aortic regions demonstrate possible minimal possible subtle increase in size.      5/18/17: Cycle #13 Niraparib.  191.  6/15/17: Cycle #14 Niraparib.  146.  7/13/17: Cycle #15 Niraparib 200 mg.  171     CT (8/9/17):       IMPRESSION:  1. Segment 6 hepatic metastasis is stable to minimally increased in  size.  2. Slight increase in multicentric adenopathy, most pronounced at the  edmundo hepatis. Additional sites include the inferior left  neck/supraclavicular region and retroperitoneum which appear stable to  minimally increased.      8/10/17:  175  9/14/17: MUGA LVEF 54%  9/22/17: C1D1 carboplatin/Doxil.  187.  10/19/17: C2D1  carboplatin/Doxil.  108.  11/17/17: C3D1 carboplatin/Doxil.  82.  12/14/17: C4D1 carboplatin/Doxil.  83.  1/12/18: C5D1 carboplatin/Doxil.  80.  2/9/18: C6D1 carboplatin/Doxil.  71.  3/2/18:  49. Three month treatment break.  6/20/2018:  170. CT CAP:  IMPRESSION: In this patient with history of ovarian cancer:  1. Increased size of a right hepatic lobe lesion and numerous edmundo  hepatis and retroperitoneal lymph nodes compatible with progression of  metastatic disease.  2. New mild intrahepatic biliary ductal dilatation, periportal edema,  and pericholecystic fluid. Increased soft tissue fullness in the edmundo  hepatis in the expected location of the common hepatic duct. Findings  are suspicious for developing biliary ductal obstruction secondary to  worsening metastatic disease in the edmundo hepatis. Correlation with  liver function tests is recommended. Right upper quadrant ultrasound  may be beneficial.  3. Unchanged left supraclavicular and right hilar lymphadenopathy in  the chest.     8/3/18: C1D1 weekly paclitaxel.  not done.    9/20/18: C2D1 weekly paclitaxel 80mg/m2.  56.    11/8/18: C3D1 weekly paclitaxel 80mg/m2.  pending.    Past Medical History:   Diagnosis Date     Antiplatelet or antithrombotic long-term use      Ascites      Blood clot in the legs      Diabetes (H)      Ovarian cancer (H)     serous,stg IV     Pleural effusion      Pulmonary embolism (H) 2/2012     Refusal of blood transfusions as patient is [employer]      Short gut syndrome      Subclinical hypothyroidism 4/18/2013     Thrombosis of leg        Past Surgical History:   Procedure Laterality Date     COLECTOMY       COLONOSCOPY  2/1/2012    Procedure:COLONOSCOPY; With Biopsy; Surgeon:TONI SULLIVAN; Location:[facility] OR     HYSTERECTOMY TOTAL ABD, RHIANNA SALPINGO-OOPHORECTOMY, NODE DISSECTION, TUMOR DEBULKING, COMBINED  2/1/2012    Procedure:COMBINED HYSTERECTOMY TOTAL  ABDOMINAL, BILATERAL SALPINGO-OOPHORECTOMY, NODE DISSECTION, TUMOR DEBULKING;  Exploratory Laparotomy, Total Abdominal Hysterectomy, Bilateral Salpingo-Oophorectomy, appendectomy,lysis of adhesions, ileal, ascending, transverse and splenic flexure resection, ileal descending bowel renanastomosis, incidental cystotomy repair, CUSA procedure and colonoscopy ; [facility]     INSERT PORT PERITONEAL ACCESS  4/3/2012    Procedure:INSERT PORT PERITONEAL ACCESS; Intraperitoneal Port Placement (c-arm); Surgeon:SAMULE CARRASCO; Location:[facility] OR     INSERT PORT PERITONEAL ACCESS  5/14/2014    Procedure: INSERT PORT PERITONEAL ACCESS;  Surgeon: Nga Yeung MD;  Location: [facility] OR     INSERT PORT VASCULAR ACCESS       LAPAROSCOPY DIAGNOSTIC (GYN)  5/14/2014    Procedure: LAPAROSCOPY DIAGNOSTIC (GYN);  Surgeon: Nga Yeung MD;  Location: [facility] OR     LAPAROTOMY EXPLORATORY Right 11/25/2015    Procedure: LAPAROTOMY EXPLORATORY;  Surgeon: Nga Yeung MD;  Location: [facility] OR     LAPAROTOMY, TUMOR DEBULKING, COMBINED  5/14/2014    Procedure: COMBINED LAPAROTOMY, TUMOR DEBULKING;  Surgeon: Nga Yeung MD;  Location: [facility]     REMOVE CATHETER PERITONEAL N/A 8/20/2014    Procedure: REMOVE CATHETER PERITONEAL;  Surgeon: Nga Yeung MD;  Location: [facility]     VASCULAR SURGERY      stent left iliac vein       Current Outpatient Prescriptions   Medication     Ascorbic Acid (VITAMIN C PO)     Calcium Carbonate-Vitamin D (CALCIUM + D PO)     cyanocobalamin (VITAMIN B12) 1000 MCG/ML injection     diphenoxylate-atropine (LOMOTIL) 2.5-0.025 MG per tablet     Ferrous Sulfate 324 (65 Fe) MG TBEC     HERBALS     LEVOTHYROXINE SODIUM PO     loperamide (IMODIUM) 2 MG capsule     LORazepam (ATIVAN) 1 MG tablet     magnesium oxide (MAG-OX) 400 MG tablet     omeprazole (PRILOSEC) 20 MG CR capsule     order for DME     potassium chloride (KLOR-CON) 20 MEQ packet     VITAMIN E NATURAL PO     No current facility-administered  medications for this visit.      Facility-Administered Medications Ordered in Other Visits   Medication     heparin 100 UNIT/ML injection 500 Units          No Known Allergies    Family History   Problem Relation Age of Onset     Cancer Mother 69     lung, smoker     Cancer Maternal Uncle 65     brain     Colon Cancer Maternal Aunt 80     colon       Social History     Social History     Marital status:      Spouse name: N/A     Number of children: N/A     Years of education: N/A     Occupational History     Not on file.     Social History Main Topics     Smoking status: Former Smoker     Years: 8.00     Types: Cigarettes     Quit date: 6/21/1980     Smokeless tobacco: Never Used      Comment: started at 11 yo and quit at 18 yo     Alcohol use Yes      Comment: 3x/day wine or jodi     Drug use: No     Sexual activity: Not on file     Other Topics Concern     Not on file     Social History Narrative       Review of Systems     Constitutional:  Negative for fever, chills, weight loss, weight gain, fatigue, decreased appetite, night sweats, recent stressors, height gain, height loss, post-operative complications, incisional pain, hallucinations, increased energy, hyperactivity and confused.   HENT:  Positive for sinus congestion. Negative for ear pain, hearing loss, tinnitus, nosebleeds, trouble swallowing, hoarse voice, mouth sores, sore throat, ear discharge, tooth pain, gum tenderness, taste disturbance, smell disturbance, hearing aid, bleeding gums, dry mouth, sinus pain and neck mass.    Eyes:  Negative for double vision, pain, redness, eye pain, decreased vision, eye watering, eye bulging, eye dryness, flashing lights, spots, floaters, strabismus, tunnel vision, jaundice and eye irritation.   Respiratory:   Positive for wheezing (rare, not currently experiencing). Negative for cough, hemoptysis, sputum production, shortness of breath, sleep disturbances due to breathing, snores loudly, respiratory pain,  dyspnea on exertion, cough disturbing sleep and postural dyspnea.    Cardiovascular:  Negative for chest pain, dyspnea on exertion, palpitations, orthopnea, claudication, leg swelling, fingers/toes turn blue, hypertension, hypotension, syncope, history of heart murmur, chest pain on exertion, chest pain at rest, pacemaker, few scattered varicosities, leg pain, sleep disturbances due to breathing, tachycardia, light-headedness, exercise intolerance and edema.   Gastrointestinal:  Positive for diarrhea. Negative for heartburn, nausea, vomiting, abdominal pain, constipation, blood in stool, melena, rectal pain, bloating, hemorrhoids, bowel incontinence, jaundice, rectal bleeding, coffee ground emesis and change in stool.   Genitourinary:  Negative for bladder incontinence, dysuria, urgency, hematuria, flank pain, vaginal discharge, difficulty urinating, genital sores, dyspareunia, decreased libido, nocturia, voiding less frequently, arousal difficulty, abnormal vaginal bleeding, excessive menstruation, menstrual changes, hot flashes, vaginal dryness and postmenopausal bleeding.   Musculoskeletal:  Negative for myalgias, back pain, joint swelling, arthralgias, stiffness, muscle cramps, neck pain, bone pain, muscle weakness and fracture.   Skin:  Negative for nail changes, itching, poor wound healing, rash, hair changes, skin changes, acne, warts, poor wound healing, scarring, flaky skin, Raynaud's phenomenon, sensitivity to sunlight and skin thickening.   Neurological:  Positive for tingling. Negative for dizziness, tremors, speech change, seizures, loss of consciousness, weakness, light-headedness, numbness, headaches, disturbances in coordination, extremity numbness, memory loss, difficulty walking and paralysis.   Endo/Heme:  Positive for anemia. Negative for swollen glands and bruises/bleeds easily.   Psychiatric/Behavioral:  Negative for depression, hallucinations, memory loss, decreased concentration, mood swings  "and panic attacks.    Breast:  Negative for breast discharge, breast mass, breast pain and nipple retraction.   Endocrine:  Negative for altered temperature regulation, polyphagia, polydipsia, unwanted hair growth and change in facial hair.        Physical Exam:    /68  Pulse 92  Temp 98.2  F (36.8  C) (Tympanic)  Resp 16  Ht 1.651 m (5' 5\")  Wt 65.3 kg (144 lb)  SpO2 97%  BMI 23.96 kg/m2    General: Alert non-toxic appearing female in no acute distress  HEENT: Normocephalic, atraumatic; PERRLA; no scleral icterus; anicteric sclera; oropharynx pink without lesions; neck supple  Pulmonary: Lungs clear to auscultation, no increased work of breathing noted  Cardiac: Regular rate and rhythm, S1S2, no clicks, murmurs, rubs, or gallops; bilateral lower extremities without edema  GI: Normoactive bowel sounds x4 quadrants, abdomen soft, non-distended, and non-tender to palpation without masses or organomegaly  : Not indicated  Heme: Cervical, supraclavicular, and inguinal nodes without lymphadenopathy  MSK: Moves all extremities, no obvious muscle wasting  Neuro: No gross deficits, normal gait  Skin: Appropriate color for race, warm and dry, no rashes or lesions to unclothed skin  Psych: Pleasant and interactive, affect bright, makes appropriate eye contact, thought process linear    Labs:   11/8/2018  Day 1   Hemoglobin 11.7 - 15.7 g/dL 12.6   Hematocrit 35.0 - 47.0 % 38.4   Platelet Count 150 - 450 10e9/L 272   Absolute Neutrophil 1.6 - 8.3 10e9/L 5.5   Sodium 133 - 144 mmol/L 137   Potassium 3.4 - 5.3 mmol/L 4.1   Chloride 94 - 109 mmol/L 103   Carbon Dioxide 20 - 32 mmol/L 26   Urea Nitrogen 7 - 30 mg/dL 13   Creatinine 0.52 - 1.04 mg/dL 0.78   Calcium 8.5 - 10.1 mg/dL 9.3   Magnesium 1.6 - 2.3 mg/dL 1.2 (A)   Bilirubin Total 0.2 - 1.3 mg/dL 0.3   ALT 0 - 50 U/L 26   AST 0 - 45 U/L 21   Alkaline Phosphatase 40 - 150 U/L 164 (A)   Albumin 3.4 - 5.0 g/dL 3.6   Protein Total 6.8 - 8.8 g/dL 7.1   WBC 4.0 - " 11.0 10e9/L 10.0    pending    Assessment/Plan:  1) Recurrent stage IIIC bilateral ovarian cancer: Currently feeling well and feels that weekly paclitaxel is tolerable, offering a good quality of life at the moment. Proceed with C3D1 weekly paclitaxel 80mg/m2 as originally ordered by Dr. Yeung. To follow up in clinic for CT and treatment planning with Dr. Yeung in seven weeks- to call clinic in the interim with concerns. Alkaline phosphatase continues to improve and ALT/AST normalized. Most likely related to her disease given this was elevated prior to starting paclitaxel. Continue to monitor- reviewed red flags including RUQ pain, fever, chills, vomiting, and jaundice and when to seek further care. Magnesium to be replaced intravenously in clinic twice weekly, continue home supplementation with magnesium oxide 400mg PO BID as tolerated. Continue loperamide for diarrhea, encouraged to take on a more regular basis so that she is not having more than 1-2 loose stools daily as this is where she may be losing magnesium. Reviewed s/sxs hypomagnesemia and when to seek further care. Denies need for intervention with neuropathy. Reviewed signs and symptoms for when she should contact the clinic or seek additional care, including but not limited to fever, chills, inability to keep down food or fluids, nausea and vomiting not controlled with antiemetics, and diarrhea leading to dehydration. Patient to contact the clinic with any questions or concerns in the interim.   2) Genetic counseling: Testing has been performed and she is negative for mutations in BRCA1, BRCA2, EPCAM, MLH1, MSH2, MSH6, PMS2, PTEN, and TP53 genes  3) Health maintenance issues discussed include to continue following up with PCP for non-gynecologic concerns. Albuterol inhaler refilled for rare episodes of wheezing.  4) Patient verbalized understanding of and agreement with plan    A total of 20 minutes spent with patient, over 50% spent in  counseling and coordination of care.    HAILE De Paz, FNP-C  Family Nurse Practitioner  Gynecologic Oncology  [facility]  Pager: [phone]          Again, thank you for allowing me to participate in the care of your patient.        Sincerely,        HAILE De Paz CNP

## 2018-11-08 NOTE — MR AVS SNAPSHOT
After Visit Summary   11/8/2018    Odalys Nathan    MRN: [id number]           Patient Information     Date Of Birth          1960        Visit Information        Provider Department      11/8/2018 9:00 AM  INFUSION CHAIR 12 [facility]        Today's Diagnoses     Encounter for long-term (current) use of medications    -  1    Ovarian cancer, right (H)        Ovarian cancer, left (H)        Drug-induced neutropenia (H)           Follow-ups after your visit        Future tests that were ordered for you today     Open Standing Orders        Priority Remaining Interval Expires Ordered    Mag Routine 10/10 weekly 11/8/2019 11/8/2018          Open Future Orders        Priority Expected Expires Ordered    CT Chest/Abdomen/Pelvis w Contrast Routine  11/8/2019 11/8/2018            Who to contact     If you have questions or need follow up information about today's clinic visit or your schedule please contact [facility] INF[facility]ION [facility] directly at [phone].  Normal or non-critical lab and imaging results will be communicated to you by [employer]hart, letter or phone within 4 business days after the clinic has received the results. If you do not hear from us within 7 days, please contact the clinic through Vy[employer] or phone. If you have a critical or abnormal lab result, we will notify you by phone as soon as possible.  Submit refill requests through [employer] or call your pharmacy and they will forward the refill request to us. Please allow 3 business days for your refill to be completed.          Additional Information About Your Visit        [employer]hart Information     [employer] gives you secure access to your electronic health record. If you see a primary care provider, you can also send messages to your care team and make appointments. If you have questions, please call your primary care clinic.  If you do not have a primary care provider, please call  [phone] and they will assist you.        Care EveryWhere ID     This is your Care EveryWhere ID. This could be used by other organizations to access your [facility] medical records  [id number]         Blood Pressure from Last 3 Encounters:   11/08/18 124/68   11/05/18 119/63   10/29/18 119/70    Weight from Last 3 Encounters:   11/08/18 65.3 kg (144 lb)   10/23/18 64.7 kg (142 lb 9.6 oz)   10/17/18 64.5 kg (142 lb 4.8 oz)              Today, you had the following     No orders found for display         Today's Medication Changes          These changes are accurate as of 11/8/18 10:44 AM.  If you have any questions, ask your nurse or doctor.               Start taking these medicines.        Dose/Directions    albuterol 108 (90 Base) MCG/ACT inhaler   Commonly known as:  PROAIR HFA/PROVENTIL HFA/VENTOLIN HFA   Used for:  Ovarian cancer, unspecified laterality (H), Wheezing   Started by:  Patricia Rocha APRN CNP        Dose:  2 puff   Inhale 2 puffs into the lungs every 6 hours as needed for shortness of breath / dyspnea or wheezing   Quantity:  1 Inhaler   Refills:  1            Where to get your medicines      These medications were sent to [id number] - [place], [place] - [address]  [address] [facility] [address]    Hours:  9Am-9Pm (M-F) 9Am-6Pm (S&S) Phone:  [phone]     albuterol 108 (90 Base) MCG/ACT inhaler                Primary Care Provider Office Phone # Fax #    Aubrie Hester [phone]       [facility] [address] [facility] [address]        Equal Access to Services     SANDY CHAMBERS AH: Felicia Walter, wil mukherjee, vinita staples, blanka [facility] [phone].    ATENCIÓN: Si habla español, tiene a bell disposición servicios gratuitos de asistencia lingüística. Llame al [phone].    We comply with applicable federal civil rights laws and [place] laws. We do not  discriminate on the basis of race, color, national origin, age, disability, sex, sexual orientation, or gender identity.            Thank you!     Thank you for choosing [facility] INFUSION SERVICES  for your care. Our goal is always to provide you with excellent care. Hearing back from our patients is one way we can continue to improve our services. Please take a few minutes to complete the written survey that you may receive in the mail after your visit with us. Thank you!             Your Updated Medication List - Protect others around you: Learn how to safely use, store and throw away your medicines at www.disposemymeds.org.          This list is accurate as of 11/8/18 10:44 AM.  Always use your most recent med list.                   Brand Name Dispense Instructions for use Diagnosis    albuterol 108 (90 Base) MCG/ACT inhaler    PROAIR HFA/PROVENTIL HFA/VENTOLIN HFA    1 Inhaler    Inhale 2 puffs into the lungs every 6 hours as needed for shortness of breath / dyspnea or wheezing    Ovarian cancer, unspecified laterality (H), Wheezing       CALCIUM + D PO      Take 1 tablet by mouth daily.    Pelvic mass       cyanocobalamin 1000 MCG/ML injection    VITAMIN B12    1 mL    Inject 1 mL (1,000 mcg) into the muscle every 30 days    B12 deficiency       diphenoxylate-atropine 2.5-0.025 MG per tablet    LOMOTIL    60 tablet    Take 2 tablets by mouth 4 times daily as needed for diarrhea    Acute diarrhea       Ferrous Sulfate 324 (65 Fe) MG Tbec     90 tablet    TAKE ONE TABLET BY MOUTH THREE TIMES DAILY WITH MEALS. TAKE WITH A SMALL AMOUNT OF ORANGE JUICE. DO NOT TAKE WITH CALCIUM    Ovarian cancer, right (H), Anemia, unspecified type, Ovarian cancer, left (H)       HERBALS      daily        LEVOTHYROXINE SODIUM PO      Take by mouth daily        loperamide 2 MG capsule    IMODIUM     Take 2 mg by mouth 4 times daily as needed for diarrhea        LORazepam 1 MG tablet    ATIVAN    30 tablet    Take  1 tablet (1 mg) by mouth every 6 hours as needed (Anxiety, Nausea/Vomiting or Sleep)    Ovarian cancer, unspecified laterality (H)       magnesium oxide 400 MG tablet    MAG-OX    90 tablet    Take 1 tablet (400 mg) by mouth 2 times daily    Ovarian cancer, unspecified laterality (H)       omeprazole 20 MG CR capsule    priLOSEC    30 capsule    Take 1 capsule (20 mg) by mouth daily    Dyspepsia       order for DME     3 each    Injection Supplies for Vitamin B12: 3cc syringes w/ 27 gauge needles, 1/2 inch length    B12 deficiency       potassium chloride 20 MEQ Packet    KLOR-CON     Take 20 mEq by mouth daily        VITAMIN C PO      Take 500 mg by mouth daily    Pelvic mass       VITAMIN E NATURAL PO      Take 100 Units by mouth daily

## 2018-11-08 NOTE — MR AVS SNAPSHOT
After Visit Summary   11/8/2018    Odalys Nathan    MRN: [id number]           Patient Information     Date Of Birth          1960        Visit Information        Provider Department      11/8/2018 7:45 AM RH LAB DRAW 1 [facility]        Today's Diagnoses     Encounter for long-term (current) use of medications    -  1    Ovarian cancer, right (H)        Ovarian cancer, left (H)        Drug-induced neutropenia (H)           Follow-ups after your visit        Your next 10 appointments already scheduled     Nov 08, 2018  9:00 AM CST   Level 2 with RH INFUSION CHAIR 12   [facility] ([facility])    [facility]  [address]  [facility] [address] [phone]              Who to contact     If you have questions or need follow up information about today's clinic visit or your schedule please contact [facility] INFUSION SERVICES directly at [phone].  Normal or non-critical lab and imaging results will be communicated to you by [employer], letter or phone within 4 business days after the clinic has received the results. If you do not hear from us within 7 days, please contact the clinic through [email or web address] or phone. If you have a critical or abnormal lab result, we will notify you by phone as soon as possible.  Submit refill requests through [employer] or call your pharmacy and they will forward the refill request to us. Please allow 3 business days for your refill to be completed.          Additional Information About Your Visit        MyChart Information     [employer] gives you secure access to your electronic health record. If you see a primary care provider, you can also send messages to your care team and make appointments. If you have questions, please call your primary care clinic.  If you do not have a primary care provider, please call [phone] and they  will assist you.        Care EveryWhere ID     This is your Care EveryWhere ID. This could be used by other organizations to access your [facility] medical records  [id number]         Blood Pressure from Last 3 Encounters:   11/05/18 119/63   10/29/18 119/70   10/25/18 118/72    Weight from Last 3 Encounters:   10/23/18 64.7 kg (142 lb 9.6 oz)   10/17/18 64.5 kg (142 lb 4.8 oz)   10/11/18 65.4 kg (144 lb 3.2 oz)              Today, you had the following     No orders found for display       Primary Care Provider Office Phone # Fax #    Aubrie Airam Hester [phone]       [facility] [address] [address] [facility] [address]        Equal Access to Services     SANDY CHAMBERS : Hadii bj santana hadasho Soomaali, waaxda luqadaha, qaybta kaalmada adeegyada, blanka beckman . So [facility] [phone].    ATENCIÓN: Si habla español, tiene a bell disposición servicios gratuitos de asistencia lingüística. Liban al [phone].    We comply with applicable federal civil rights laws and [place] laws. We do not discriminate on the basis of race, color, national origin, age, disability, sex, sexual orientation, or gender identity.            Thank you!     Thank you for choosing [facility]  for your care. Our goal is always to provide you with excellent care. Hearing back from our patients is one way we can continue to improve our services. Please take a few minutes to complete the written survey that you may receive in the mail after your visit with us. Thank you!             Your Updated Medication List - Protect others around you: Learn how to safely use, store and throw away your medicines at www.disposemymeds.org.          This list is accurate as of 11/8/18  8:10 AM.  Always use your most recent med list.                   Brand Name Dispense Instructions for use Diagnosis    CALCIUM + D PO      Take 1 tablet by mouth daily.    Pelvic mass        cyanocobalamin 1000 MCG/ML injection    VITAMIN B12    1 mL    Inject 1 mL (1,000 mcg) into the muscle every 30 days    B12 deficiency       diphenoxylate-atropine 2.5-0.025 MG per tablet    LOMOTIL    60 tablet    Take 2 tablets by mouth 4 times daily as needed for diarrhea    Acute diarrhea       Ferrous Sulfate 324 (65 Fe) MG Tbec     90 tablet    TAKE ONE TABLET BY MOUTH THREE TIMES DAILY WITH MEALS. TAKE WITH A SMALL AMOUNT OF ORANGE JUICE. DO NOT TAKE WITH CALCIUM    Ovarian cancer, right (H), Anemia, unspecified type, Ovarian cancer, left (H)       HERBALS      daily        LEVOTHYROXINE SODIUM PO      Take by mouth daily        loperamide 2 MG capsule    IMODIUM     Take 2 mg by mouth 4 times daily as needed for diarrhea        LORazepam 1 MG tablet    ATIVAN    30 tablet    Take 1 tablet (1 mg) by mouth every 6 hours as needed (Anxiety, Nausea/Vomiting or Sleep)    Ovarian cancer, unspecified laterality (H)       magnesium oxide 400 MG tablet    MAG-OX    90 tablet    Take 1 tablet (400 mg) by mouth 2 times daily    Ovarian cancer, unspecified laterality (H)       omeprazole 20 MG CR capsule    priLOSEC    30 capsule    Take 1 capsule (20 mg) by mouth daily    Dyspepsia       order for DME     3 each    Injection Supplies for Vitamin B12: 3cc syringes w/ 27 gauge needles, 1/2 inch length    B12 deficiency       potassium chloride 20 MEQ Packet    KLOR-CON     Take 20 mEq by mouth daily        VITAMIN C PO      Take 500 mg by mouth daily    Pelvic mass       VITAMIN E NATURAL PO      Take 100 Units by mouth daily

## 2018-11-08 NOTE — NURSING NOTE
"Oncology Rooming Note    November 8, 2018 8:15 AM   Odalys Nathan is a 58 year old female who presents for:    Chief Complaint   Patient presents with     Oncology Clinic Visit     Ovarian cancer, right/left     Initial Vitals: /68  Pulse 92  Temp 98.2  F (36.8  C) (Tympanic)  Resp 16  Ht 1.651 m (5' 5\")  Wt 65.3 kg (144 lb)  SpO2 97%  BMI 23.96 kg/m2 Estimated body mass index is 23.96 kg/(m^2) as calculated from the following:    Height as of this encounter: 1.651 m (5' 5\").    Weight as of this encounter: 65.3 kg (144 lb). Body surface area is 1.73 meters squared.  No Pain (0) Comment: Data Unavailable   No LMP recorded. Patient has had a hysterectomy.  Allergies reviewed: Yes  Medications reviewed: Yes    Medications: Medication refills not needed today.  Pharmacy name entered into Hot Dot:    [id number] - [place], [place] - [address]  [facility] - [place], [place] - [address] [facility]  [facility]ENDRA S[facility]  [facility] [place] [place] [address] [place] [address] - [place], [place] - [address] [facility] [phone]  [place], [place] - [address] [facility]    Clinical concerns: follow up     8 minutes for nursing intake (face to face time)     Sheeba Barnett CMA     DISCHARGE PLAN:  Next appointments: See patient instruction section  Departure Mode: Ambulatory  Accompanied by:   0 minutes for nursing discharge (face to face time)   Sheeba Barnett CMA                  "

## 2018-11-08 NOTE — MR AVS SNAPSHOT
After Visit Summary   11/8/2018    Odalys Nathan    MRN: [id number]           Patient Information     Date Of Birth          1960        Visit Information        Provider Department      11/8/2018 8:00 AM Patricia Rocha APRN CNP [facility]        Today's Diagnoses     Ovarian cancer, unspecified laterality (H)    -  1    Ovarian cancer, right (H)        Ovarian cancer, left (H)        Hypomagnesemia        Wheezing          Care Instructions    Needs five more weeks of paclitaxel scheduled, will need one day of lab and magnesium replacement added each week (Tuesdays please)    In seven weeks will need to see Dr. Yeung with a CT CAP with contrast beforehand            Follow-ups after your visit        Your next 10 appointments already scheduled     Nov 15, 2018  9:00 AM CST   (Arrive by 8:00 AM)   Level 2 with RH INFUSION CHAIR 7   [facility] ([facility])    [facility]  [address]  [facility] [address]   [phone]            Nov 23, 2018  8:00 AM CST   Level 2 with RH INFUSION CHAIR 11   [facility] ([facility])    [facility]  [address]  [facility] [address]   [phone]            Nov 27, 2018  8:00 AM CST   Level 4 with RH INFUSION CHAIR 8   [facility] ([facility])    [facility]  [address]  [facility] [address]   [phone]            Nov 29, 2018  8:00 AM CST   Level 2 with RH INFUSION CHAIR 11   [facility] ([facility])    [facility]  [address]  [facility] [address]   [phone]            Dec 04, 2018  8:00 AM CST   Level 4 with RH INFUSION CHAIR 5   [facility] ([facility])    [facility]  [address]  [facility] [address]   [phone]            Dec 06, 2018  8:00 AM CST   Level 2 with RH INFUSION CHAIR 6   [facility] ([facility])    [facility]  [address]  [facility] [address]   [phone]            Dec 11, 2018  8:00 AM CST   Level 4 with RH INFUSION CHAIR 6   [facility] ([facility])    [facility]  [address]  [facility] [address]   [phone]            Dec 13, 2018  8:00 AM CST   Level 2 with RH INFUSION CHAIR 11   [facility] ([facility])    [facility]  [address]  [facility] [address]   [phone]            Dec 17, 2018  8:30 AM CST   CT CHEST/ABDOMEN/PELVIS W CONTRAST with [id number]   [facility] ([facility])    [address] [facility] [address]  [facility] [address]   [phone]           How do I prepare for my exam? (Food and drink instructions) To prepare: Do not eat or drink for 2 hours before your exam. If you need to take medicine, you may take it with small sips of water. (We may ask you to take liquid medicine as well.)  How do I prepare for my exam? (Other instructions) Please arrive 30 minutes early for your CT.  Once in the department you might be asked to drink water 15-20 minutes prior to your exam.  If indicated you may be asked to drink an oral contrast in advance of your CT.  If this is the case, the imaging team will let you know or be in contact with you prior to your appointment  Patients over 70 or patients with diabetes or kidney problems: If you haven t had a blood test (creatinine test) within the last 30 days, the Cardiologist/Radiologist may require you to get  this test prior to your exam.  If you have diabetes:  Continue to take your metformin medication on the day of your exam  What should I wear: Please wear loose clothing, such as a sweat suit or jogging clothes. Avoid snaps, zippers and other metal. We may ask you to undress and put on a hospital gown.  How long does the exam take: Most scans take less than 20 minutes.  What should I bring: Please bring any scans or X-rays taken at other hospitals, if similar tests were done. Also bring a list of your medicines, including vitamins, minerals and over-the-counter drugs. It is safest to leave personal items at home.  Do I need a : No  is needed.  What do I need to tell my doctor? Be sure to tell your doctor: * If you have any allergies. * If there s any chance you are pregnant. * If you are breastfeeding.  What should I do after the exam: No restrictions, You may resume normal activities.  What is this test: A CT (computed tomography) scan is a series of pictures that allows us to look inside your body. The scanner creates images of the body in cross sections, much like slices of bread. This helps us see any problems more clearly. You may receive contrast (X-ray dye) before or during your scan. You will be asked to drink the contrast.  Who should I call with questions: If you have any questions, please call the Imaging Department where you will have your exam. Directions, parking instructions, and other information is available on our website, [email or web address].            Dec 20, 2018  1:35 PM CST   (Arrive by 1:20 PM)   Return Visit with Nga Yeung MD   [facility] ([facility])    [address] [place] [address]  [address]  [facility] [address] [phone]              Future tests that were ordered for you today     Open Standing Orders        Priority Remaining Interval Expires Ordered    Mag Routine 10/10 weekly 11/8/2019 11/8/2018          Open  "Future Orders        Priority Expected Expires Ordered    CT Chest/Abdomen/Pelvis w Contrast Routine  11/8/2019 11/8/2018            Who to contact     If you have questions or need follow up information about today's clinic visit or your schedule please contact [facility] directly at [phone].  Normal or non-critical lab and imaging results will be communicated to you by MyChart, letter or phone within 4 business days after the clinic has received the results. If you do not hear from us within 7 days, please contact the clinic through [employer] or phone. If you have a critical or abnormal lab result, we will notify you by phone as soon as possible.  Submit refill requests through [employer] or call your pharmacy and they will forward the refill request to us. Please allow 3 business days for your refill to be completed.          Additional Information About Your Visit        MyChart Information     [employer] gives you secure access to your electronic health record. If you see a primary care provider, you can also send messages to your care team and make appointments. If you have questions, please call your primary care clinic.  If you do not have a primary care provider, please call [phone] and they will assist you.        Care EveryWhere ID     This is your Care EveryWhere ID. This could be used by other organizations to access your [place] medical records  [id number]        Your Vitals Were     Pulse Temperature Respirations Height Pulse Oximetry BMI (Body Mass Index)    92 98.2  F (36.8  C) (Tympanic) 16 1.651 m (5' 5\") 97% 23.96 kg/m2       Blood Pressure from Last 3 Encounters:   11/08/18 124/68   11/05/18 119/63   10/29/18 119/70    Weight from Last 3 Encounters:   11/08/18 65.3 kg (144 lb)   10/23/18 64.7 kg (142 lb 9.6 oz)   10/17/18 64.5 kg (142 lb 4.8 oz)                 Today's Medication Changes          These changes are accurate as of 11/8/18 11:07 AM.  If you have any " questions, ask your nurse or doctor.               Start taking these medicines.        Dose/Directions    albuterol 108 (90 Base) MCG/ACT inhaler   Commonly known as:  PROAIR HFA/PROVENTIL HFA/VENTOLIN HFA   Used for:  Ovarian cancer, unspecified laterality (H), Wheezing   Started by:  Patricia Rocha APRN CNP        Dose:  2 puff   Inhale 2 puffs into the lungs every 6 hours as needed for shortness of breath / dyspnea or wheezing   Quantity:  1 Inhaler   Refills:  1            Where to get your medicines      These medications were sent to [id number] - [place], [place] - [address]  [address] [facility] [address]    Hours:  9Am-9Pm (M-F) 9Am-6Pm (S&S) Phone:  [phone]     albuterol 108 (90 Base) MCG/ACT inhaler                Primary Care Provider Office Phone # Fax #    Aubrie Hester [phone]       [facility] [address]  [facility] [address]        Equal Access to Services     [facility]: Hadii bj santana hadasho Socait, waaxda luqadaha, qaybta kaalmada adeegyajulieta, blanka beckman . So [facility] [place] [facility] [phone].    ATENCIÓN: Si habla español, tiene a bell disposición servicios gratuitos de asistencia lingüística. Llame al [phone].    We comply with applicable federal civil rights laws and [place] laws. We do not discriminate on the basis of race, color, national origin, age, disability, sex, sexual orientation, or gender identity.            Thank you!     Thank you for choosing [facility]  for your care. Our goal is always to provide you with excellent care. Hearing back from our patients is one way we can continue to improve our services. Please take a few minutes to complete the written survey that you may receive in the mail after your visit with us. Thank you!             Your Updated Medication List - Protect others around you: Learn how to safely use, store and throw away your  medicines at www.disposemymeds.org.          This list is accurate as of 11/8/18 11:07 AM.  Always use your most recent med list.                   Brand Name Dispense Instructions for use Diagnosis    albuterol 108 (90 Base) MCG/ACT inhaler    PROAIR HFA/PROVENTIL HFA/VENTOLIN HFA    1 Inhaler    Inhale 2 puffs into the lungs every 6 hours as needed for shortness of breath / dyspnea or wheezing    Ovarian cancer, unspecified laterality (H), Wheezing       CALCIUM + D PO      Take 1 tablet by mouth daily.    Pelvic mass       cyanocobalamin 1000 MCG/ML injection    VITAMIN B12    1 mL    Inject 1 mL (1,000 mcg) into the muscle every 30 days    B12 deficiency       diphenoxylate-atropine 2.5-0.025 MG per tablet    LOMOTIL    60 tablet    Take 2 tablets by mouth 4 times daily as needed for diarrhea    Acute diarrhea       Ferrous Sulfate 324 (65 Fe) MG Tbec     90 tablet    TAKE ONE TABLET BY MOUTH THREE TIMES DAILY WITH MEALS. TAKE WITH A SMALL AMOUNT OF ORANGE JUICE. DO NOT TAKE WITH CALCIUM    Ovarian cancer, right (H), Anemia, unspecified type, Ovarian cancer, left (H)       HERBALS      daily        LEVOTHYROXINE SODIUM PO      Take by mouth daily        loperamide 2 MG capsule    IMODIUM     Take 2 mg by mouth 4 times daily as needed for diarrhea        LORazepam 1 MG tablet    ATIVAN    30 tablet    Take 1 tablet (1 mg) by mouth every 6 hours as needed (Anxiety, Nausea/Vomiting or Sleep)    Ovarian cancer, unspecified laterality (H)       magnesium oxide 400 MG tablet    MAG-OX    90 tablet    Take 1 tablet (400 mg) by mouth 2 times daily    Ovarian cancer, unspecified laterality (H)       omeprazole 20 MG CR capsule    priLOSEC    30 capsule    Take 1 capsule (20 mg) by mouth daily    Dyspepsia       order for DME     3 each    Injection Supplies for Vitamin B12: 3cc syringes w/ 27 gauge needles, 1/2 inch length    B12 deficiency       potassium chloride 20 MEQ Packet    KLOR-CON     Take 20 mEq by mouth daily         VITAMIN C PO      Take 500 mg by mouth daily    Pelvic mass       VITAMIN E NATURAL PO      Take 100 Units by mouth daily

## 2018-11-08 NOTE — PROGRESS NOTES
Infusion Nursing Note:  Odalys Nathan presents today for port labs.    Patient seen by provider today: Yes: Patricia Rocha   present during visit today: Not Applicable.    Note: N/A.    Intravenous Access:  Labs drawn without difficulty.  Implanted Port.    Treatment Conditions:  Lab Results   Component Value Date    HGB 12.0 10/23/2018     Lab Results   Component Value Date    WBC 6.3 10/23/2018      Lab Results   Component Value Date    ANEU 3.1 10/23/2018     Lab Results   Component Value Date     10/23/2018      Lab Results   Component Value Date     10/11/2018                   Lab Results   Component Value Date    POTASSIUM 3.5 11/05/2018           Lab Results   Component Value Date    MAG 1.1 11/05/2018            Lab Results   Component Value Date    CR 0.71 10/11/2018                   Lab Results   Component Value Date    JOSE ALBERTO 8.8 10/11/2018                Lab Results   Component Value Date    BILITOTAL 0.4 10/11/2018           Lab Results   Component Value Date    ALBUMIN 3.5 10/11/2018                    Lab Results   Component Value Date    ALT 38 10/11/2018           Lab Results   Component Value Date    AST 22 10/11/2018           Post Infusion Assessment:  Site patent and intact, free from redness, edema or discomfort.  No evidence of extravasations.    Discharge Plan:   Patient discharged in stable condition accompanied by: self.  Departure Mode: Ambulatory.    Payton Flynn RN

## 2018-11-08 NOTE — PROGRESS NOTES
Infusion Nursing Note:  Odalys Nathan presents today for C3D1 taxol and magnesium.    Patient seen by provider today: Yes: Patricia Rocha.   present during visit today: Not Applicable.    Note: Mag 1.2, gave 2 grams.    Intravenous Access:  Implanted Port.    Treatment Conditions:  Lab Results   Component Value Date    HGB 12.6 11/08/2018     Lab Results   Component Value Date    WBC 10.0 11/08/2018      Lab Results   Component Value Date    ANEU 5.5 11/08/2018     Lab Results   Component Value Date     11/08/2018      Results reviewed, labs MET treatment parameters, ok to proceed with treatment.      Post Infusion Assessment:  Patient tolerated infusion without incident.  Blood return noted pre and post infusion.  Site patent and intact, free from redness, edema or discomfort.  No evidence of extravasations.  Access discontinued per protocol.    Discharge Plan:   Appointments not ready upon discharge, patient will stop at  for this.  Patient discharged in stable condition accompanied by: .  Departure Mode: Ambulatory.    Sonia Pham RN

## 2018-11-10 ENCOUNTER — MYC MEDICAL ADVICE (OUTPATIENT)
Dept: ONCOLOGY | Facility: CLINIC | Age: 58
End: 2018-11-10

## 2018-11-13 ENCOUNTER — CARE COORDINATION (OUTPATIENT)
Dept: ONCOLOGY | Facility: CLINIC | Age: 58
End: 2018-11-13

## 2018-11-13 ENCOUNTER — INFUSION THERAPY VISIT (OUTPATIENT)
Dept: INFUSION THERAPY | Facility: CLINIC | Age: 58
End: 2018-11-13
Attending: NURSE PRACTITIONER
Payer: COMMERCIAL

## 2018-11-13 VITALS
HEART RATE: 85 BPM | RESPIRATION RATE: 18 BRPM | SYSTOLIC BLOOD PRESSURE: 121 MMHG | TEMPERATURE: 98.6 F | OXYGEN SATURATION: 98 % | DIASTOLIC BLOOD PRESSURE: 79 MMHG

## 2018-11-13 DIAGNOSIS — C56.1 OVARIAN CANCER, RIGHT (H): ICD-10-CM

## 2018-11-13 DIAGNOSIS — C56.2 OVARIAN CANCER, LEFT (H): ICD-10-CM

## 2018-11-13 DIAGNOSIS — E83.42 HYPOMAGNESEMIA: Primary | ICD-10-CM

## 2018-11-13 LAB — MAGNESIUM SERPL-MCNC: 1.1 MG/DL (ref 1.6–2.3)

## 2018-11-13 PROCEDURE — 25000128 H RX IP 250 OP 636: Mod: ZF | Performed by: NURSE PRACTITIONER

## 2018-11-13 PROCEDURE — 96366 THER/PROPH/DIAG IV INF ADDON: CPT

## 2018-11-13 PROCEDURE — 83735 ASSAY OF MAGNESIUM: CPT | Performed by: NURSE PRACTITIONER

## 2018-11-13 PROCEDURE — 96365 THER/PROPH/DIAG IV INF INIT: CPT

## 2018-11-13 RX ORDER — HEPARIN SODIUM (PORCINE) LOCK FLUSH IV SOLN 100 UNIT/ML 100 UNIT/ML
500 SOLUTION INTRAVENOUS DAILY PRN
Status: CANCELLED
Start: 2018-11-13

## 2018-11-13 RX ORDER — MAGNESIUM SULFATE HEPTAHYDRATE 40 MG/ML
4 INJECTION, SOLUTION INTRAVENOUS ONCE
Status: COMPLETED | OUTPATIENT
Start: 2018-11-13 | End: 2018-11-13

## 2018-11-13 RX ADMIN — MAGNESIUM SULFATE IN WATER 4 G: 40 INJECTION, SOLUTION INTRAVENOUS at 13:29

## 2018-11-13 RX ADMIN — SODIUM CHLORIDE 1000 ML: 9 INJECTION, SOLUTION INTRAVENOUS at 13:31

## 2018-11-13 NOTE — TELEPHONE ENCOUNTER
Care Coordinator Note  [facility] was unable to accommodate the Mg replacement this week  Called [facility] and they were also unable to accommodate.   [facility] also unable to accommodate.  Called [facility] and they could accommodate.  Patient was notified and she can come in.      Patient verbalized back understanding of the above information discussed.   Tammy CISNEROS RN, OCN  Care Coordinator   Gynecologic Cancer   Office [phone]  Pager [phone] #[phone]

## 2018-11-13 NOTE — MR AVS SNAPSHOT
After Visit Summary   11/13/2018    Odalys Nathan    MRN: [id number]           Patient Information     Date Of Birth          1960        Visit Information        Provider Department      11/13/2018 12:30 PM UC 48 ATC; UC SPEC INFUSION [facility]        Today's Diagnoses     Hypomagnesemia    -  1    Ovarian cancer, right (H)        Ovarian cancer, left (H)           Follow-ups after your visit        Your next 10 appointments already scheduled     Nov 15, 2018  8:30 AM CST   (Arrive by 7:30 AM)   Level 2 with RH INFUSION CHAIR 12   [facility] ([facility])    [facility]  [address]  [facility] [address]   [phone]            Nov 23, 2018  8:00 AM CST   Level 2 with RH INFUSION CHAIR 6   [facility] ([facility])    [facility]  [address]  [place] [address]   [phone]            Nov 27, 2018  8:00 AM CST   Level 4 with RH INFUSION CHAIR 8   [facility] ([facility])    [facility]  [address]  [facility] [address]   [phone]            Nov 29, 2018  8:00 AM CST   Level 2 with RH INFUSION CHAIR 11   [facility] ([facility])    [facility]  [address]  [place] [address]   [phone]            Dec 04, 2018  8:00 AM CST   Level 4 with RH INFUSION CHAIR 5   [facility] ([facility])    [facility]  [address]  [place] [address]   [phone]            Dec 06, 2018  8:00 AM CST   Level 2 with RH INFUSION CHAIR 6   [facility] ([facility])    [facility]  [address]  [facility] [address]   [phone]            Dec 11, 2018  8:00 AM CST   Level 4 with RH INFUSION CHAIR 6   [facility] ([facility])    [facility]  [address]  [place] [address]   [phone]            Dec 13, 2018  8:00 AM CST   Level 2 with RH INFUSION CHAIR 11   [facility] ([facility])    [facility]  [address]  [facility] [address]   [phone]            Dec 17, 2018  8:30 AM CST   CT CHEST/ABDOMEN/PELVIS W CONTRAST with RSCCCT1   [facility] ([facility])    [address]  [facility] [address]   [phone]           How do I prepare for my exam? (Food and drink instructions) To prepare: Do not eat or drink for 2 hours before your exam. If you need to take medicine, you may take it with small sips of water. (We may ask you to take liquid medicine as well.)  How do I prepare for my exam? (Other instructions) Please arrive 30 minutes early for your CT.  Once in the department you might be asked to drink water 15-20 minutes prior to your exam.  If indicated you may be asked to drink an oral contrast in advance of your CT.  If this is the case, the imaging team will let you know or be in contact with you prior to your appointment  Patients over 70 or patients with diabetes or kidney problems: If you haven t had a blood test (creatinine test) within the last 30 days, the Cardiologist/Radiologist may require you to get this test prior to your exam.  If you have diabetes:  Continue to take your metformin medication on the day of your exam  What should I wear: Please wear loose clothing, such as a sweat suit or jogging clothes. Avoid snaps, zippers and other metal. We may ask you to undress and put on a  \[facility] [place] [facility]\""w.  How long does the exam take: Most scans take less than 20 minutes.  What should I bring: Please bring any scans or X-rays taken at other hospitals, if similar tests were done. Also bring a list of your medicines, including vitamins, minerals and over-the-counter drugs. It is safest to leave personal items at home.  Do I need a : No  is needed.  What do I need to tell my doctor? Be sure to tell your doctor: * If you have any allergies. * If there s any chance you are pregnant. * If you are breastfeeding.  What should I do after the exam: No restrictions, You may resume normal activities.  What is this test: A CT (computed tomography) scan is a series of pictures that allows us to look inside your body. The scanner creates images of the body in cross sections, much like slices of bread. This helps us see any problems more clearly. You may receive contrast (X-ray dye) before or during your scan. You will be asked to drink the contrast.  Who should I call with questions: If you have any questions, please call the Imaging Department where you will have your exam. Directions, parking instructions, and other information is available on our website, [email or web address].            Dec 20, 2018  1:35 PM CST   (Arrive by 1:20 PM)   Return Visit with Nga Yeung MD   [facility] ([facility])    [address] [place] [address]  [address]  [facility] [address] [phone]              Who to contact     If you have questions or need follow up information about today's clinic visit or your schedule please contact [facility] directly at [phone].  Normal or non-critical lab and imaging results will be communicated to you by MyChart, letter or phone within 4 business days after the clinic has received the results. If you do not hear from us within 7 days, please contact the clinic through [employer] or  phone. If you have a critical or abnormal lab result, we will notify you by phone as soon as possible.  Submit refill requests through [employer] or call your pharmacy and they will forward the refill request to us. Please allow 3 business days for your refill to be completed.          Additional Information About Your Visit        invendo medicalhart Information     [employer] gives you secure access to your electronic health record. If you see a primary care provider, you can also send messages to your care team and make appointments. If you have questions, please call your primary care clinic.  If you do not have a primary care provider, please call [phone] and they will assist you.        Care EveryWhere ID     This is your Care EveryWhere ID. This could be used by other organizations to access your [place] medical records  [id number]        Your Vitals Were     Pulse Temperature Respirations Pulse Oximetry          85 98.6  F (37  C) (Oral) 18 98%         Blood Pressure from Last 3 Encounters:   11/13/18 121/79   11/08/18 124/68   11/05/18 119/63    Weight from Last 3 Encounters:   11/08/18 65.3 kg (144 lb)   10/23/18 64.7 kg (142 lb 9.6 oz)   10/17/18 64.5 kg (142 lb 4.8 oz)              We Performed the Following     [facility]        Primary Care Provider Office Phone # Fax #    Aubrie Airam Hester [phone]       [facility] [address] [facility] [address]        Equal Access to Services     SANDY CHAMBERS AH: Hadii bj Walter, wajeovanny mukherjee, qaybta kaalmada bel, blanka szymanski. So [facility] [phone].    ATENCIÓN: Si habla español, tiene a bell disposición servicios gratuitos de asistencia lingüística. Llame al [phone].    We comply with applicable federal civil rights laws and [place] laws. We do not discriminate on the basis of race, color, national origin, age, disability, sex, sexual orientation, or gender identity.            Thank  you!     Thank you for choosing [facility]  for your care. Our goal is always to provide you with excellent care. Hearing back from our patients is one way we can continue to improve our services. Please take a few minutes to complete the written survey that you may receive in the mail after your visit with us. Thank you!             Your Updated Medication List - Protect others around you: Learn how to safely use, store and throw away your medicines at www.disposemymeds.org.          This list is accurate as of 11/13/18  3:53 PM.  Always use your most recent med list.                   Brand Name Dispense Instructions for use Diagnosis    albuterol 108 (90 Base) MCG/ACT inhaler    PROAIR HFA/PROVENTIL HFA/VENTOLIN HFA    1 Inhaler    Inhale 2 puffs into the lungs every 6 hours as needed for shortness of breath / dyspnea or wheezing    Ovarian cancer, unspecified laterality (H), Wheezing       CALCIUM + D PO      Take 1 tablet by mouth daily.    Pelvic mass       cyanocobalamin 1000 MCG/ML injection    VITAMIN B12    1 mL    Inject 1 mL (1,000 mcg) into the muscle every 30 days    B12 deficiency       diphenoxylate-atropine 2.5-0.025 MG per tablet    LOMOTIL    60 tablet    Take 2 tablets by mouth 4 times daily as needed for diarrhea    Acute diarrhea       Ferrous Sulfate 324 (65 Fe) MG Tbec     90 tablet    TAKE ONE TABLET BY MOUTH THREE TIMES DAILY WITH MEALS. TAKE WITH A SMALL AMOUNT OF ORANGE JUICE. DO NOT TAKE WITH CALCIUM    Ovarian cancer, right (H), Anemia, unspecified type, Ovarian cancer, left (H)       HERBALS      daily        LEVOTHYROXINE SODIUM PO      Take by mouth daily        loperamide 2 MG capsule    IMODIUM     Take 2 mg by mouth 4 times daily as needed for diarrhea        LORazepam 1 MG tablet    ATIVAN    30 tablet    Take 1 tablet (1 mg) by mouth every 6 hours as needed (Anxiety, Nausea/Vomiting or Sleep)    Ovarian cancer, unspecified  laterality (H)       magnesium oxide 400 MG tablet    MAG-OX    90 tablet    Take 1 tablet (400 mg) by mouth 2 times daily    Ovarian cancer, unspecified laterality (H)       omeprazole 20 MG CR capsule    priLOSEC    30 capsule    Take 1 capsule (20 mg) by mouth daily    Dyspepsia       order for DME     3 each    Injection Supplies for Vitamin B12: 3cc syringes w/ 27 gauge needles, 1/2 inch length    B12 deficiency       potassium chloride 20 MEQ Packet    KLOR-CON     Take 20 mEq by mouth daily        VITAMIN C PO      Take 500 mg by mouth daily    Pelvic mass       VITAMIN E NATURAL PO      Take 100 Units by mouth daily

## 2018-11-13 NOTE — PROGRESS NOTES
Nursing Note  [facility]dalys [facility]:   Chief Complaint   Patient presents with     Infusion     fluids and possible magnesium     During [facility]'s [facility] appointment, orders from Patricia Rocha CNP were completed.  Frequency: as needed    Progress note:  Patient identification verified by name and date of birth.  Assessment completed.  Vitals recorded in Doc Flowsheets.  Patient was provided with education regarding infusion and possible side effects.  Patient verbalized understanding.      needed: No  Premedications: were not ordered.  Infusion Rates: normal saline given over 1 hour, magnesium given over 2 hours.   Approximate Infusion length:2 hours.   Labs: were drawn per orders.   Vascular access: port accessed today.  Treatment Conditions: patient's Mg 1.1 today. Per electrolyte replacement protocol patient received 4 grams of Mg today.   Patient tolerated infusion: well.    Administrations This Visit     0.9% sodium chloride BOLUS     Admin Date Action Dose Rate Route Administered By          11/13/2018 New Bag 1000 mL 500 mL/hr Intravenous Gloria Willett RN                   magnesium sulfate 4 g in 100 mL sterile water (premade)     Admin Date Action Dose Route Administered By             11/13/2018 New Bag 4 g Intravenous Gloria Willett RN                            Discharge Plan:   Follow up plan of care with: ongoing infusions at [facility].  Discharge instructions were reviewed with patient.  Patient/representative verbalized understanding of discharge instructions and all questions answered.  [facility] in stable condition.    Gloria Willett RN        BP (P) 134/74  Pulse (P) 85  Temp (P) 98.6  F (37  C) (Oral)  Resp (P) 18  SpO2 (P) 98%

## 2018-11-15 ENCOUNTER — INFUSION THERAPY VISIT (OUTPATIENT)
Dept: INFUSION THERAPY | Facility: CLINIC | Age: 58
End: 2018-11-15
Attending: NURSE PRACTITIONER
Payer: COMMERCIAL

## 2018-11-15 ENCOUNTER — HOSPITAL ENCOUNTER (OUTPATIENT)
Facility: CLINIC | Age: 58
Setting detail: SPECIMEN
Discharge: HOME | End: 2018-11-15
Attending: NURSE PRACTITIONER | Admitting: NURSE PRACTITIONER
Payer: COMMERCIAL

## 2018-11-15 VITALS
WEIGHT: 144.3 LBS | RESPIRATION RATE: 16 BRPM | BODY MASS INDEX: 24.01 KG/M2 | DIASTOLIC BLOOD PRESSURE: 75 MMHG | OXYGEN SATURATION: 97 % | SYSTOLIC BLOOD PRESSURE: 129 MMHG | TEMPERATURE: 97.9 F | HEART RATE: 82 BPM

## 2018-11-15 DIAGNOSIS — D70.2 DRUG-INDUCED NEUTROPENIA (H): ICD-10-CM

## 2018-11-15 DIAGNOSIS — C56.2 OVARIAN CANCER, LEFT (H): ICD-10-CM

## 2018-11-15 DIAGNOSIS — C56.1 OVARIAN CANCER, RIGHT (H): ICD-10-CM

## 2018-11-15 DIAGNOSIS — Z79.899 ENCOUNTER FOR LONG-TERM (CURRENT) USE OF MEDICATIONS: Primary | ICD-10-CM

## 2018-11-15 LAB
BASOPHILS # BLD AUTO: 0 10E9/L (ref 0–0.2)
BASOPHILS NFR BLD AUTO: 0.4 %
DIFFERENTIAL METHOD BLD: ABNORMAL
EOSINOPHIL # BLD AUTO: 0.2 10E9/L (ref 0–0.7)
EOSINOPHIL NFR BLD AUTO: 2.4 %
ERYTHROCYTE [DISTWIDTH] IN BLOOD BY AUTOMATED COUNT: 14.1 % (ref 10–15)
HCT VFR BLD AUTO: 35.9 % (ref 35–47)
HGB BLD-MCNC: 11.7 G/DL (ref 11.7–15.7)
IMM GRANULOCYTES # BLD: 0.1 10E9/L (ref 0–0.4)
IMM GRANULOCYTES NFR BLD: 1 %
LYMPHOCYTES # BLD AUTO: 2.1 10E9/L (ref 0.8–5.3)
LYMPHOCYTES NFR BLD AUTO: 26.4 %
MAGNESIUM SERPL-MCNC: 1.4 MG/DL (ref 1.6–2.3)
MCH RBC QN AUTO: 35.3 PG (ref 26.5–33)
MCHC RBC AUTO-ENTMCNC: 32.6 G/DL (ref 31.5–36.5)
MCV RBC AUTO: 109 FL (ref 78–100)
MONOCYTES # BLD AUTO: 0.6 10E9/L (ref 0–1.3)
MONOCYTES NFR BLD AUTO: 7.9 %
NEUTROPHILS # BLD AUTO: 4.9 10E9/L (ref 1.6–8.3)
NEUTROPHILS NFR BLD AUTO: 61.9 %
NRBC # BLD AUTO: 0 10*3/UL
NRBC BLD AUTO-RTO: 0 /100
PLATELET # BLD AUTO: 271 10E9/L (ref 150–450)
POTASSIUM SERPL-SCNC: 3.9 MMOL/L (ref 3.4–5.3)
RBC # BLD AUTO: 3.31 10E12/L (ref 3.8–5.2)
WBC # BLD AUTO: 8 10E9/L (ref 4–11)

## 2018-11-15 PROCEDURE — 83735 ASSAY OF MAGNESIUM: CPT | Performed by: NURSE PRACTITIONER

## 2018-11-15 PROCEDURE — 25000132 ZZH RX MED GY IP 250 OP 250 PS 637: Performed by: NURSE PRACTITIONER

## 2018-11-15 PROCEDURE — 25000125 ZZHC RX 250: Performed by: NURSE PRACTITIONER

## 2018-11-15 PROCEDURE — 84132 ASSAY OF SERUM POTASSIUM: CPT | Performed by: NURSE PRACTITIONER

## 2018-11-15 PROCEDURE — 25000128 H RX IP 250 OP 636: Performed by: NURSE PRACTITIONER

## 2018-11-15 PROCEDURE — 85025 COMPLETE CBC W/AUTO DIFF WBC: CPT | Performed by: NURSE PRACTITIONER

## 2018-11-15 PROCEDURE — 96413 CHEMO IV INFUSION 1 HR: CPT

## 2018-11-15 PROCEDURE — 96368 THER/DIAG CONCURRENT INF: CPT

## 2018-11-15 PROCEDURE — 96375 TX/PRO/DX INJ NEW DRUG ADDON: CPT

## 2018-11-15 RX ORDER — DIPHENHYDRAMINE HCL 25 MG
25 CAPSULE ORAL ONCE
Status: COMPLETED | OUTPATIENT
Start: 2018-11-15 | End: 2018-11-15

## 2018-11-15 RX ORDER — HEPARIN SODIUM (PORCINE) LOCK FLUSH IV SOLN 100 UNIT/ML 100 UNIT/ML
500 SOLUTION INTRAVENOUS EVERY 8 HOURS
Status: DISCONTINUED | OUTPATIENT
Start: 2018-11-15 | End: 2018-11-15 | Stop reason: HOSPADM

## 2018-11-15 RX ORDER — HEPARIN SODIUM (PORCINE) LOCK FLUSH IV SOLN 100 UNIT/ML 100 UNIT/ML
5 SOLUTION INTRAVENOUS EVERY 8 HOURS
Status: DISCONTINUED | OUTPATIENT
Start: 2018-11-15 | End: 2018-11-15 | Stop reason: HOSPADM

## 2018-11-15 RX ADMIN — SODIUM CHLORIDE 250 ML: 9 INJECTION, SOLUTION INTRAVENOUS at 09:20

## 2018-11-15 RX ADMIN — DIPHENHYDRAMINE HYDROCHLORIDE 25 MG: 25 CAPSULE ORAL at 09:19

## 2018-11-15 RX ADMIN — DEXAMETHASONE SODIUM PHOSPHATE 12 MG: 10 INJECTION, SOLUTION INTRAMUSCULAR; INTRAVENOUS at 09:24

## 2018-11-15 RX ADMIN — MAGNESIUM SULFATE HEPTAHYDRATE 2 G: 500 INJECTION, SOLUTION INTRAMUSCULAR; INTRAVENOUS at 10:09

## 2018-11-15 RX ADMIN — FAMOTIDINE 40 MG: 10 INJECTION, SOLUTION INTRAVENOUS at 09:21

## 2018-11-15 RX ADMIN — SODIUM CHLORIDE, PRESERVATIVE FREE 5 ML: 5 INJECTION INTRAVENOUS at 11:17

## 2018-11-15 RX ADMIN — PACLITAXEL 138 MG: 6 INJECTION, SOLUTION INTRAVENOUS at 10:11

## 2018-11-15 ASSESSMENT — PAIN SCALES - GENERAL: PAINLEVEL: NO PAIN (0)

## 2018-11-15 NOTE — PROGRESS NOTES
Infusion Nursing Note:  Odalys Nathan presents today for C3D8 Taxol/IV Mag.    Patient seen by provider today: No   present during visit today: Not Applicable.    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    Intravenous Access:  Lab draw site port, Needle type port, Gauge 20 3/4in.  Labs drawn without difficulty.  Implanted Port.    Treatment Conditions:  Lab Results   Component Value Date    HGB 11.7 11/15/2018     Lab Results   Component Value Date    WBC 8.0 11/15/2018      Lab Results   Component Value Date    ANEU 4.9 11/15/2018     Lab Results   Component Value Date     11/15/2018      Results reviewed, labs MET treatment parameters, ok to proceed with treatment.      Post Infusion Assessment:  Patient tolerated infusion without incident.  Blood return noted pre and post infusion.  Site patent and intact, free from redness, edema or discomfort.  No evidence of extravasations.  Access discontinued per protocol.    Discharge Plan:   Discharge instructions reviewed with: Patient.  Patient discharged in stable condition accompanied by: .  Departure Mode: Ambulatory.  Scheduled for labs and infusion again on 11/23/18.    ARNOLD GILLETTE RN

## 2018-11-15 NOTE — MR AVS SNAPSHOT
After Visit Summary   11/15/2018    Odalys Nathan    MRN: [id number]           Patient Information     Date Of Birth          1960        Visit Information        Provider Department      11/15/2018 8:30 AM RH INFUSION CHAIR 12 [facility]        Today's Diagnoses     Encounter for long-term (current) use of medications    -  1    Ovarian cancer, right (H)        Ovarian cancer, left (H)        Drug-induced neutropenia (H)           Follow-ups after your visit        Your next 10 appointments already scheduled     Nov 23, 2018  8:00 AM CST   Level 2 with RH INFUSION CHAIR 6   [facility] ([facility])    [facility]  [address]  [place] [address]   [phone]            Nov 27, 2018  8:00 AM CST   Level 4 with RH INFUSION CHAIR 8   [facility] ([facility])    [facility]  [address]  [place] [address]   [phone]            Nov 29, 2018  8:00 AM CST   Level 2 with RH INFUSION CHAIR 11   [facility] ([facility])    [facility]  [address]  [place] [address]   [phone]            Dec 04, 2018  8:00 AM CST   Level 4 with RH INFUSION CHAIR 5   [facility] ([facility])    [facility]  [address]  [place] [address]   [phone]            Dec 06, 2018  8:00 AM CST   Level 2 with RH INFUSION CHAIR 6   [facility] ([facility])    [facility]  [address]  [place] [address]   [phone]            Dec 11, 2018  8:00 AM CST   Level 4 with RH INFUSION CHAIR 6   [facility]  ([facility])    [facility]  [address]  [place] [address]   [phone]            Dec 13, 2018  8:00 AM CST   Level 2 with RH INFUSION CHAIR 11   [facility] ([facility])    [facility]  [address]  [place] [address]   [phone]            Dec 17, 2018  8:30 AM CST   CT CHEST/ABDOMEN/PELVIS W CONTRAST with RSCCCT1   [facility] ([facility])    [address]  [facility] [address]   [phone]           How do I prepare for my exam? (Food and drink instructions) To prepare: Do not eat or drink for 2 hours before your exam. If you need to take medicine, you may take it with small sips of water. (We may ask you to take liquid medicine as well.)  How do I prepare for my exam? (Other instructions) Please arrive 30 minutes early for your CT.  Once in the department you might be asked to drink water 15-20 minutes prior to your exam.  If indicated you may be asked to drink an oral contrast in advance of your CT.  If this is the case, the imaging team will let you know or be in contact with you prior to your appointment  Patients over 70 or patients with diabetes or kidney problems: If you haven t had a blood test (creatinine test) within the last 30 days, the Cardiologist/Radiologist may require you to get this test prior to your exam.  If you have diabetes:  Continue to take your metformin medication on the day of your exam  What should I wear: Please wear loose clothing, such as a sweat suit or jogging clothes. Avoid snaps, zippers and other metal. We may ask you to undress and put on a hospital gown.  How long does the exam take: Most scans take less than 20 minutes.  What should I bring: Please bring any scans or X-rays taken at other hospitals, if similar tests were done. Also bring a list of your  medicines, including vitamins, minerals and over-the-counter drugs. It is safest to leave personal items at home.  Do I need a : No  is needed.  What do I need to tell my doctor? Be sure to tell your doctor: * If you have any allergies. * If there s any chance you are pregnant. * If you are breastfeeding.  What should I do after the exam: No restrictions, You may resume normal activities.  What is this test: A CT (computed tomography) scan is a series of pictures that allows us to look inside your body. The scanner creates images of the body in cross sections, much like slices of bread. This helps us see any problems more clearly. You may receive contrast (X-ray dye) before or during your scan. You will be asked to drink the contrast.  Who should I call with questions: If you have any questions, please call the Imaging Department where you will have your exam. Directions, parking instructions, and other information is available on our website, [email or web address].            Dec 20, 2018  1:35 PM CST   (Arrive by 1:20 PM)   Return Visit with Nga Yeung MD   [facility] ([facility])    [address] [place] [address]  [address]  [facility] [address] [phone]              Who to contact     If you have questions or need follow up information about today's clinic visit or your schedule please contact [facility] INFUSION SERVICES directly at [phone].  Normal or non-critical lab and imaging results will be communicated to you by MyChart, letter or phone within 4 business days after the clinic has received the results. If you do not hear from us within 7 days, please contact the clinic through MyChart or phone. If you have a critical or abnormal lab result, we will notify you by phone as soon as possible.  Submit refill requests through [employer] or call your pharmacy and they will forward the refill request to us. Please allow 3  business days for your refill to be completed.          Additional Information About Your Visit        MyChart Information     Personal Factoryhart gives you secure access to your electronic health record. If you see a primary care provider, you can also send messages to your care team and make appointments. If you have questions, please call your primary care clinic.  If you do not have a primary care provider, please call [phone] and they will assist you.        Care EveryWhere ID     This is your Care EveryWhere ID. This could be used by other organizations to access your [place] medical records  [id number]        Your Vitals Were     Pulse Temperature Respirations Pulse Oximetry BMI (Body Mass Index)       82 97.9  F (36.6  C) 16 97% 24.01 kg/m2        Blood Pressure from Last 3 Encounters:   11/15/18 129/75   11/13/18 121/79   11/08/18 124/68    Weight from Last 3 Encounters:   11/15/18 65.5 kg (144 lb 4.8 oz)   11/08/18 65.3 kg (144 lb)   10/23/18 64.7 kg (142 lb 9.6 oz)              We Performed the Following     CBC with platelets differential     Magnesium     Potassium        Primary Care Provider Office Phone # Fax #    Aubrie Hester [phone]       [facility] [address] [facility] [address]        Equal Access to Services     SANDY CHAMBERS : Hadii aad ku hadasho Socait, waaxda luqadaha, qaybta kaalmada adeegyada, blanka dempsey haymarquise beckman . So [facility] [phone].    ATENCIÓN: Si habla español, tiene a bell disposición servicios gratuitos de asistencia lingüística. Llame al [phone].    We comply with applicable federal civil rights laws and [place] laws. We do not discriminate on the basis of race, color, national origin, age, disability, sex, sexual orientation, or gender identity.            Thank you!     Thank you for choosing [facility] INFUSION [facility]  for your care. Our goal is always to provide you with excellent  care. Hearing back from our patients is one way we can continue to improve our services. Please take a few minutes to complete the written survey that you may receive in the mail after your visit with us. Thank you!             Your Updated Medication List - Protect others around you: Learn how to safely use, store and throw away your medicines at www.disposemymeds.org.          This list is accurate as of 11/15/18  2:28 PM.  Always use your most recent med list.                   Brand Name Dispense Instructions for use Diagnosis    albuterol 108 (90 Base) MCG/ACT inhaler    PROAIR HFA/PROVENTIL HFA/VENTOLIN HFA    1 Inhaler    Inhale 2 puffs into the lungs every 6 hours as needed for shortness of breath / dyspnea or wheezing    Ovarian cancer, unspecified laterality (H), Wheezing       CALCIUM + D PO      Take 1 tablet by mouth daily.    Pelvic mass       cyanocobalamin 1000 MCG/ML injection    VITAMIN B12    1 mL    Inject 1 mL (1,000 mcg) into the muscle every 30 days    B12 deficiency       diphenoxylate-atropine 2.5-0.025 MG per tablet    LOMOTIL    60 tablet    Take 2 tablets by mouth 4 times daily as needed for diarrhea    Acute diarrhea       Ferrous Sulfate 324 (65 Fe) MG Tbec     90 tablet    TAKE ONE TABLET BY MOUTH THREE TIMES DAILY WITH MEALS. TAKE WITH A SMALL AMOUNT OF ORANGE JUICE. DO NOT TAKE WITH CALCIUM    Ovarian cancer, right (H), Anemia, unspecified type, Ovarian cancer, left (H)       HERBALS      daily        LEVOTHYROXINE SODIUM PO      Take by mouth daily        loperamide 2 MG capsule    IMODIUM     Take 2 mg by mouth 4 times daily as needed for diarrhea        LORazepam 1 MG tablet    ATIVAN    30 tablet    Take 1 tablet (1 mg) by mouth every 6 hours as needed (Anxiety, Nausea/Vomiting or Sleep)    Ovarian cancer, unspecified laterality (H)       magnesium oxide 400 MG tablet    MAG-OX    90 tablet    Take 1 tablet (400 mg) by mouth 2 times daily    Ovarian cancer, unspecified laterality  (H)       omeprazole 20 MG CR capsule    priLOSEC    30 capsule    Take 1 capsule (20 mg) by mouth daily    Dyspepsia       order for DME     3 each    Injection Supplies for Vitamin B12: 3cc syringes w/ 27 gauge needles, 1/2 inch length    B12 deficiency       potassium chloride 20 MEQ Packet    KLOR-CON     Take 20 mEq by mouth daily        VITAMIN C PO      Take 500 mg by mouth daily    Pelvic mass       VITAMIN E NATURAL PO      Take 100 Units by mouth daily

## 2018-11-23 ENCOUNTER — INFUSION THERAPY VISIT (OUTPATIENT)
Dept: INFUSION THERAPY | Facility: CLINIC | Age: 58
End: 2018-11-23
Attending: NURSE PRACTITIONER
Payer: COMMERCIAL

## 2018-11-23 ENCOUNTER — HOSPITAL ENCOUNTER (OUTPATIENT)
Facility: CLINIC | Age: 58
Setting detail: SPECIMEN
Discharge: HOME | End: 2018-11-23
Attending: NURSE PRACTITIONER | Admitting: NURSE PRACTITIONER
Payer: COMMERCIAL

## 2018-11-23 VITALS
TEMPERATURE: 97.6 F | HEART RATE: 85 BPM | WEIGHT: 140 LBS | RESPIRATION RATE: 16 BRPM | OXYGEN SATURATION: 100 % | DIASTOLIC BLOOD PRESSURE: 71 MMHG | BODY MASS INDEX: 23.3 KG/M2 | SYSTOLIC BLOOD PRESSURE: 117 MMHG

## 2018-11-23 DIAGNOSIS — Z79.899 ENCOUNTER FOR LONG-TERM (CURRENT) USE OF MEDICATIONS: Primary | ICD-10-CM

## 2018-11-23 DIAGNOSIS — C56.1 OVARIAN CANCER, RIGHT (H): ICD-10-CM

## 2018-11-23 DIAGNOSIS — C56.2 OVARIAN CANCER, LEFT (H): ICD-10-CM

## 2018-11-23 DIAGNOSIS — D70.2 DRUG-INDUCED NEUTROPENIA (H): ICD-10-CM

## 2018-11-23 LAB
BASOPHILS # BLD AUTO: 0 10E9/L (ref 0–0.2)
BASOPHILS NFR BLD AUTO: 0.5 %
DIFFERENTIAL METHOD BLD: ABNORMAL
EOSINOPHIL # BLD AUTO: 0.3 10E9/L (ref 0–0.7)
EOSINOPHIL NFR BLD AUTO: 3.3 %
ERYTHROCYTE [DISTWIDTH] IN BLOOD BY AUTOMATED COUNT: 14 % (ref 10–15)
HCT VFR BLD AUTO: 36.7 % (ref 35–47)
HGB BLD-MCNC: 12 G/DL (ref 11.7–15.7)
IMM GRANULOCYTES # BLD: 0.1 10E9/L (ref 0–0.4)
IMM GRANULOCYTES NFR BLD: 0.6 %
LYMPHOCYTES # BLD AUTO: 2.7 10E9/L (ref 0.8–5.3)
LYMPHOCYTES NFR BLD AUTO: 32.8 %
MAGNESIUM SERPL-MCNC: 1.1 MG/DL (ref 1.6–2.3)
MCH RBC QN AUTO: 34.7 PG (ref 26.5–33)
MCHC RBC AUTO-ENTMCNC: 32.7 G/DL (ref 31.5–36.5)
MCV RBC AUTO: 106 FL (ref 78–100)
MONOCYTES # BLD AUTO: 0.5 10E9/L (ref 0–1.3)
MONOCYTES NFR BLD AUTO: 6.3 %
NEUTROPHILS # BLD AUTO: 4.6 10E9/L (ref 1.6–8.3)
NEUTROPHILS NFR BLD AUTO: 56.5 %
NRBC # BLD AUTO: 0 10*3/UL
NRBC BLD AUTO-RTO: 0 /100
PLATELET # BLD AUTO: 322 10E9/L (ref 150–450)
POTASSIUM SERPL-SCNC: 3.7 MMOL/L (ref 3.4–5.3)
RBC # BLD AUTO: 3.46 10E12/L (ref 3.8–5.2)
WBC # BLD AUTO: 8.1 10E9/L (ref 4–11)

## 2018-11-23 PROCEDURE — 85025 COMPLETE CBC W/AUTO DIFF WBC: CPT | Performed by: NURSE PRACTITIONER

## 2018-11-23 PROCEDURE — 96367 TX/PROPH/DG ADDL SEQ IV INF: CPT

## 2018-11-23 PROCEDURE — 84132 ASSAY OF SERUM POTASSIUM: CPT | Performed by: NURSE PRACTITIONER

## 2018-11-23 PROCEDURE — 83735 ASSAY OF MAGNESIUM: CPT | Performed by: NURSE PRACTITIONER

## 2018-11-23 PROCEDURE — 25000128 H RX IP 250 OP 636: Performed by: NURSE PRACTITIONER

## 2018-11-23 PROCEDURE — 96413 CHEMO IV INFUSION 1 HR: CPT

## 2018-11-23 PROCEDURE — 96375 TX/PRO/DX INJ NEW DRUG ADDON: CPT

## 2018-11-23 PROCEDURE — 25000132 ZZH RX MED GY IP 250 OP 250 PS 637: Performed by: NURSE PRACTITIONER

## 2018-11-23 PROCEDURE — 25000125 ZZHC RX 250: Performed by: NURSE PRACTITIONER

## 2018-11-23 RX ORDER — MAGNESIUM SULFATE HEPTAHYDRATE 40 MG/ML
4 INJECTION, SOLUTION INTRAVENOUS ONCE
Status: DISCONTINUED | OUTPATIENT
Start: 2018-11-23 | End: 2018-11-23 | Stop reason: CLARIF

## 2018-11-23 RX ORDER — HEPARIN SODIUM (PORCINE) LOCK FLUSH IV SOLN 100 UNIT/ML 100 UNIT/ML
500 SOLUTION INTRAVENOUS EVERY 8 HOURS
Status: DISCONTINUED | OUTPATIENT
Start: 2018-11-23 | End: 2018-11-23 | Stop reason: HOSPADM

## 2018-11-23 RX ORDER — DIPHENHYDRAMINE HCL 25 MG
25 CAPSULE ORAL ONCE
Status: COMPLETED | OUTPATIENT
Start: 2018-11-23 | End: 2018-11-23

## 2018-11-23 RX ADMIN — MAGNESIUM SULFATE HEPTAHYDRATE: 500 INJECTION, SOLUTION INTRAMUSCULAR; INTRAVENOUS at 09:20

## 2018-11-23 RX ADMIN — SODIUM CHLORIDE, PRESERVATIVE FREE 500 UNITS: 5 INJECTION INTRAVENOUS at 11:25

## 2018-11-23 RX ADMIN — DIPHENHYDRAMINE HYDROCHLORIDE 25 MG: 25 CAPSULE ORAL at 08:46

## 2018-11-23 RX ADMIN — PACLITAXEL 138 MG: 6 INJECTION, SOLUTION INTRAVENOUS at 09:23

## 2018-11-23 RX ADMIN — DEXAMETHASONE SODIUM PHOSPHATE 12 MG: 10 INJECTION, SOLUTION INTRAMUSCULAR; INTRAVENOUS at 08:59

## 2018-11-23 RX ADMIN — FAMOTIDINE 40 MG: 10 INJECTION, SOLUTION INTRAVENOUS at 08:57

## 2018-11-23 RX ADMIN — SODIUM CHLORIDE 250 ML: 9 INJECTION, SOLUTION INTRAVENOUS at 08:46

## 2018-11-23 NOTE — PROGRESS NOTES
Infusion Nursing Note:  Odalys Nathan presents today for taxol and magnesium.    Patient seen by provider today: No   present during visit today: Not Applicable.    Note: PO Benadryl given. Magnesium replaced per protocol.    Intravenous Access:  Labs drawn without difficulty.  Implanted Port.    Treatment Conditions:  Lab Results   Component Value Date    HGB 12.0 11/23/2018     Lab Results   Component Value Date    WBC 8.1 11/23/2018      Lab Results   Component Value Date    ANEU 4.6 11/23/2018     Lab Results   Component Value Date     11/23/2018      Results reviewed, labs MET treatment parameters, ok to proceed with treatment.      Post Infusion Assessment:  Patient tolerated infusion without incident.  Blood return noted pre and post infusion.  Site patent and intact, free from redness, edema or discomfort.  No evidence of extravasations.  Access discontinued per protocol.    Discharge Plan:   Patient declined prescription refills.  Discharge instructions reviewed with: Patient.  Patient and/or family verbalized understanding of discharge instructions and all questions answered.  Copy of AVS reviewed with patient and/or family.  Patient will return 11/27/18 for next appointment.  Patient discharged in stable condition accompanied by: .  Departure Mode: Ambulatory.    Payton Flynn RN

## 2018-11-25 DIAGNOSIS — D64.9 ANEMIA, UNSPECIFIED TYPE: ICD-10-CM

## 2018-11-25 DIAGNOSIS — C56.2 OVARIAN CANCER, LEFT (H): ICD-10-CM

## 2018-11-25 DIAGNOSIS — C56.1 OVARIAN CANCER, RIGHT (H): ICD-10-CM

## 2018-11-26 RX ORDER — FERROUS SULFATE 324(65)MG
TABLET, DELAYED RELEASE (ENTERIC COATED) ORAL
Qty: 90 TABLET | Refills: 11 | Status: SHIPPED | OUTPATIENT
Start: 2018-11-26

## 2018-11-26 NOTE — TELEPHONE ENCOUNTER
patient calling for refill of iron for the patient  Last visit with MD 11/8/18  Last order date      Disp Refills Start End JHON   Ferrous Sulfate 324 (65 Fe) MG TBEC 90 tablet 0 10/25/2018  No   Sig: TAKE ONE TABLET BY MOUTH THREE TIMES DAILY WITH MEALS. TAKE WITH A SMALL AMOUNT OF ORANGE JUICE. DO NOT TAKE WITH CALCIUM       Please advise on refills for patient

## 2018-11-27 ENCOUNTER — HOSPITAL ENCOUNTER (OUTPATIENT)
Facility: CLINIC | Age: 58
Setting detail: SPECIMEN
Discharge: HOME | End: 2018-11-27
Attending: NURSE PRACTITIONER | Admitting: NURSE PRACTITIONER
Payer: COMMERCIAL

## 2018-11-27 ENCOUNTER — INFUSION THERAPY VISIT (OUTPATIENT)
Dept: INFUSION THERAPY | Facility: CLINIC | Age: 58
End: 2018-11-27
Attending: OBSTETRICS & GYNECOLOGY
Payer: COMMERCIAL

## 2018-11-27 VITALS
RESPIRATION RATE: 16 BRPM | SYSTOLIC BLOOD PRESSURE: 125 MMHG | HEART RATE: 85 BPM | OXYGEN SATURATION: 100 % | TEMPERATURE: 97.5 F | DIASTOLIC BLOOD PRESSURE: 73 MMHG

## 2018-11-27 DIAGNOSIS — E83.42 HYPOMAGNESEMIA: ICD-10-CM

## 2018-11-27 LAB — MAGNESIUM SERPL-MCNC: 1.3 MG/DL (ref 1.6–2.3)

## 2018-11-27 PROCEDURE — 83735 ASSAY OF MAGNESIUM: CPT | Performed by: NURSE PRACTITIONER

## 2018-11-27 PROCEDURE — 96365 THER/PROPH/DIAG IV INF INIT: CPT

## 2018-11-27 PROCEDURE — 25000128 H RX IP 250 OP 636: Performed by: OBSTETRICS & GYNECOLOGY

## 2018-11-27 RX ORDER — MAGNESIUM SULFATE HEPTAHYDRATE 40 MG/ML
2 INJECTION, SOLUTION INTRAVENOUS ONCE
Status: COMPLETED | OUTPATIENT
Start: 2018-11-27 | End: 2018-11-27

## 2018-11-27 RX ADMIN — MAGNESIUM SULFATE IN WATER 2 G: 40 INJECTION, SOLUTION INTRAVENOUS at 08:53

## 2018-11-27 NOTE — MR AVS SNAPSHOT
After Visit Summary   11/27/2018    Odalys Nathan    MRN: [id number]           Patient Information     Date Of Birth          1960        Visit Information        Provider Department      11/27/2018 8:00 AM RH INFUSION CHAIR 8 [facility]        Today's Diagnoses     Hypomagnesemia           Follow-ups after your visit        Your next 10 appointments already scheduled     Nov 29, 2018  8:00 AM CST   Level 2 with RH INFUSION CHAIR 12   [facility] ([facility])    [facility]  [address]  [facility] [address]   [phone]            Dec 04, 2018  8:00 AM CST   Level 4 with RH INFUSION CHAIR 5   [facility] ([facility])    [facility]  [address]  [facility] [address]   [phone]            Dec 06, 2018  8:00 AM CST   Level 2 with RH INFUSION CHAIR 6   [facility] ([facility])    [facility]  [address]  [facility] [address]   [phone]            Dec 11, 2018  8:00 AM CST   Level 4 with RH INFUSION CHAIR 6   [facility] ([facility])    [facility]  [address]  [facility] [address]   [phone]            Dec 13, 2018  8:00 AM CST   Level 2 with RH INFUSION CHAIR 11   [facility] ([facility])    [facility]  [address]  [facility] [address]   [phone]            Dec 17, 2018  8:30 AM CST   CT CHEST/ABDOMEN/PELVIS W CONTRAST with RSCCCT1   [facility] ([facility])    [address]  [facility] [address] [phone]           How do I prepare for my  exam? (Food and drink instructions) To prepare: Do not eat or drink for 2 hours before your exam. If you need to take medicine, you may take it with small sips of water. (We may ask you to take liquid medicine as well.)  How do I prepare for my exam? (Other instructions) Please arrive 30 minutes early for your CT.  Once in the department you might be asked to drink water 15-20 minutes prior to your exam.  If indicated you may be asked to drink an oral contrast in advance of your CT.  If this is the case, the imaging team will let you know or be in contact with you prior to your appointment  Patients over 70 or patients with diabetes or kidney problems: If you haven t had a blood test (creatinine test) within the last 30 days, the Cardiologist/Radiologist may require you to get this test prior to your exam.  If you have diabetes:  Continue to take your metformin medication on the day of your exam  What should I wear: Please wear loose clothing, such as a sweat suit or jogging clothes. Avoid snaps, zippers and other metal. We may ask you to undress and put on a hospital gown.  How long does the exam take: Most scans take less than 20 minutes.  What should I bring: Please bring any scans or X-rays taken at other hospitals, if similar tests were done. Also bring a list of your medicines, including vitamins, minerals and over-the-counter drugs. It is safest to leave personal items at home.  Do I need a : No  is needed.  What do I need to tell my doctor? Be sure to tell your doctor: * If you have any allergies. * If there s any chance you are pregnant. * If you are breastfeeding.  What should I do after the exam: No restrictions, You may resume normal activities.  What is this test: A CT (computed tomography) scan is a series of pictures that allows us to look inside your body. The scanner creates images of the body in cross sections, much like slices of bread. This helps us see any problems more clearly. You  may receive contrast (X-ray dye) before or during your scan. You will be asked to drink the contrast.  Who should I call with questions: If you have any questions, please call the Imaging Department where you will have your exam. Directions, parking instructions, and other information is available on our website, [email or web address].            Dec 20, 2018  1:35 PM CST   (Arrive by 1:20 PM)   Return Visit with Nga Yeung MD   [facility] ([facility])    [address] [place] [address]  [address]  [facility] [address] [phone]              Who to contact     If you have questions or need follow up information about today's clinic visit or your schedule please contact [facility] INFUSION SERVICES directly at [phone].  Normal or non-critical lab and imaging results will be communicated to you by Xpressohart, letter or phone within 4 business days after the clinic has received the results. If you do not hear from us within 7 days, please contact the clinic through Xpressohart or phone. If you have a critical or abnormal lab result, we will notify you by phone as soon as possible.  Submit refill requests through Nommunity or call your pharmacy and they will forward the refill request to us. Please allow 3 business days for your refill to be completed.          Additional Information About Your Visit        XpressoharTel[employer]ix Information     Nom[employer]ity gives you secure access to your electronic health record. If you see a primary care provider, you can also send messages to your care team and make appointments. If you have questions, please call your primary care clinic.  If you do not have a primary care provider, please call [phone] and they will assist you.        Care EveryWhere ID     This is your Care EveryWhere ID. This could be used by other organizations to access your [place] medical records  [id number]        Your Vitals Were     Pulse Temperature  Respirations Pulse Oximetry          85 97.5  F (36.4  C) (Tympanic) 16 100%         Blood Pressure from Last 3 Encounters:   11/27/18 125/73   11/23/18 117/71   11/15/18 129/75    Weight from Last 3 Encounters:   11/23/18 63.5 kg (140 lb)   11/15/18 65.5 kg (144 lb 4.8 oz)   11/08/18 65.3 kg (144 lb)              We Performed the Following     [facility]        Primary Care Provider Office Phone # Fax #    Aubriebel Hester [phone]       [facility] [address] [facility] [address]        Equal Access to Services     SANDY CHAMBERS : Felicia Walter, wil mukherjee, vinita kaalmajulieta staples, blanka beckman . So [facility] [phone].    ATENCIÓN: Si habla español, tiene a bell disposición servicios gratuitos de asistencia lingüística. Llame al [phone].    We comply with applicable federal civil rights laws and [place] laws. We do not discriminate on the basis of race, color, national origin, age, disability, sex, sexual orientation, or gender identity.            Thank you!     Thank you for choosing  INFUSION SERVICES  for your care. Our goal is always to provide you with excellent care. Hearing back from our patients is one way we can continue to improve our services. Please take a few minutes to complete the written survey that you may receive in the mail after your visit with us. Thank you!             Your Updated Medication List - Protect others around you: Learn how to safely use, store and throw away your medicines at www.disposemymeds.org.          This list is accurate as of 11/27/18  9:56 AM.  Always use your most recent med list.                   Brand Name Dispense Instructions for use Diagnosis    albuterol 108 (90 Base) MCG/ACT inhaler    PROAIR HFA/PROVENTIL HFA/VENTOLIN HFA    1 Inhaler    Inhale 2 puffs into the lungs every 6 hours as needed for shortness of breath / dyspnea or wheezing     Ovarian cancer, unspecified laterality (H), Wheezing       CALCIUM + D PO      Take 1 tablet by mouth daily.    Pelvic mass       cyanocobalamin 1000 MCG/ML injection    VITAMIN B12    1 mL    Inject 1 mL (1,000 mcg) into the muscle every 30 days    B12 deficiency       diphenoxylate-atropine 2.5-0.025 MG tablet    LOMOTIL    60 tablet    Take 2 tablets by mouth 4 times daily as needed for diarrhea    Acute diarrhea       Ferrous Sulfate 324 (65 Fe) MG Tbec     90 tablet    TAKE ONE TABLET BY MOUTH THREE TIMES DAILY WITH MEALS. TAKE WITH A SMALL AMOUNT OF ORANGE JUICE. DO NOT TAKE WITH CALCIUM    Ovarian cancer, right (H), Anemia, unspecified type, Ovarian cancer, left (H)       HERBALS      daily        LEVOTHYROXINE SODIUM PO      Take by mouth daily        loperamide 2 MG capsule    IMODIUM     Take 2 mg by mouth 4 times daily as needed for diarrhea        LORazepam 1 MG tablet    ATIVAN    30 tablet    Take 1 tablet (1 mg) by mouth every 6 hours as needed (Anxiety, Nausea/Vomiting or Sleep)    Ovarian cancer, unspecified laterality (H)       magnesium oxide 400 MG tablet    MAG-OX    90 tablet    Take 1 tablet (400 mg) by mouth 2 times daily    Ovarian cancer, unspecified laterality (H)       omeprazole 20 MG DR capsule    priLOSEC    30 capsule    Take 1 capsule (20 mg) by mouth daily    Dyspepsia       order for DME     3 each    Injection Supplies for Vitamin B12: 3cc syringes w/ 27 gauge needles, 1/2 inch length    B12 deficiency       potassium chloride 20 MEQ Packet    KLOR-CON     Take 20 mEq by mouth daily        VITAMIN C PO      Take 500 mg by mouth daily    Pelvic mass       VITAMIN E NATURAL PO      Take 100 Units by mouth daily

## 2018-11-27 NOTE — PROGRESS NOTES
Infusion Nursing Note:  Odalys Nathan presents today for Magnesium replacement.    Patient seen by provider today: No   present during visit today: Not Applicable.    Note: N/A.    Intravenous Access:  Labs drawn without difficulty.  Implanted Port.    Treatment Conditions:  Lab Results   Component Value Date     11/08/2018                   Lab Results   Component Value Date    POTASSIUM 3.7 11/23/2018           Lab Results   Component Value Date    MAG 1.3 11/27/2018            Lab Results   Component Value Date    CR 0.78 11/08/2018                   Lab Results   Component Value Date    JOSE ALBERTO 9.3 11/08/2018                Lab Results   Component Value Date    BILITOTAL 0.3 11/08/2018           Lab Results   Component Value Date    ALBUMIN 3.6 11/08/2018                    Lab Results   Component Value Date    ALT 26 11/08/2018           Lab Results   Component Value Date    AST 21 11/08/2018       Results reviewed, labs MET treatment parameters, ok to proceed with treatment.    Post Infusion Assessment:  Patient tolerated infusion without incident.  Blood return noted pre and post infusion.  Site patent and intact, free from redness, edema or discomfort.  No evidence of extravasations.  Access discontinued per protocol.    Discharge Plan:   Discharge instructions reviewed with: Patient.  Patient and/or family verbalized understanding of discharge instructions and all questions answered.  AVS to patient via ZoundsT.  Patient will return 11/29/18 for next appointment.   Patient discharged in stable condition accompanied by: self.  Departure Mode: Ambulatory.    Gloria Proctor RN

## 2018-11-29 ENCOUNTER — HOSPITAL ENCOUNTER (OUTPATIENT)
Facility: CLINIC | Age: 58
Setting detail: SPECIMEN
Discharge: HOME | End: 2018-11-29
Attending: NURSE PRACTITIONER | Admitting: NURSE PRACTITIONER
Payer: COMMERCIAL

## 2018-11-29 ENCOUNTER — INFUSION THERAPY VISIT (OUTPATIENT)
Dept: INFUSION THERAPY | Facility: CLINIC | Age: 58
End: 2018-11-29
Attending: NURSE PRACTITIONER
Payer: COMMERCIAL

## 2018-11-29 VITALS
WEIGHT: 145.3 LBS | OXYGEN SATURATION: 97 % | HEART RATE: 80 BPM | TEMPERATURE: 97.7 F | BODY MASS INDEX: 24.18 KG/M2 | SYSTOLIC BLOOD PRESSURE: 102 MMHG | DIASTOLIC BLOOD PRESSURE: 69 MMHG | RESPIRATION RATE: 16 BRPM

## 2018-11-29 DIAGNOSIS — D70.2 DRUG-INDUCED NEUTROPENIA (H): ICD-10-CM

## 2018-11-29 DIAGNOSIS — Z79.899 ENCOUNTER FOR LONG-TERM (CURRENT) USE OF MEDICATIONS: Primary | ICD-10-CM

## 2018-11-29 DIAGNOSIS — C56.1 OVARIAN CANCER, RIGHT (H): ICD-10-CM

## 2018-11-29 DIAGNOSIS — C56.2 OVARIAN CANCER, LEFT (H): ICD-10-CM

## 2018-11-29 LAB
BASOPHILS # BLD AUTO: 0 10E9/L (ref 0–0.2)
BASOPHILS NFR BLD AUTO: 0.6 %
DIFFERENTIAL METHOD BLD: ABNORMAL
EOSINOPHIL # BLD AUTO: 0.1 10E9/L (ref 0–0.7)
EOSINOPHIL NFR BLD AUTO: 2.6 %
ERYTHROCYTE [DISTWIDTH] IN BLOOD BY AUTOMATED COUNT: 14.3 % (ref 10–15)
HCT VFR BLD AUTO: 35.8 % (ref 35–47)
HGB BLD-MCNC: 11.7 G/DL (ref 11.7–15.7)
IMM GRANULOCYTES # BLD: 0 10E9/L (ref 0–0.4)
IMM GRANULOCYTES NFR BLD: 0.8 %
LYMPHOCYTES # BLD AUTO: 2.3 10E9/L (ref 0.8–5.3)
LYMPHOCYTES NFR BLD AUTO: 46.1 %
MAGNESIUM SERPL-MCNC: 1.2 MG/DL (ref 1.6–2.3)
MCH RBC QN AUTO: 35.2 PG (ref 26.5–33)
MCHC RBC AUTO-ENTMCNC: 32.7 G/DL (ref 31.5–36.5)
MCV RBC AUTO: 108 FL (ref 78–100)
MONOCYTES # BLD AUTO: 0.4 10E9/L (ref 0–1.3)
MONOCYTES NFR BLD AUTO: 7.6 %
NEUTROPHILS # BLD AUTO: 2.1 10E9/L (ref 1.6–8.3)
NEUTROPHILS NFR BLD AUTO: 42.3 %
NRBC # BLD AUTO: 0 10*3/UL
NRBC BLD AUTO-RTO: 0 /100
PLATELET # BLD AUTO: 287 10E9/L (ref 150–450)
POTASSIUM SERPL-SCNC: 4.2 MMOL/L (ref 3.4–5.3)
RBC # BLD AUTO: 3.32 10E12/L (ref 3.8–5.2)
WBC # BLD AUTO: 5 10E9/L (ref 4–11)

## 2018-11-29 PROCEDURE — 85025 COMPLETE CBC W/AUTO DIFF WBC: CPT | Performed by: NURSE PRACTITIONER

## 2018-11-29 PROCEDURE — 25000125 ZZHC RX 250: Performed by: NURSE PRACTITIONER

## 2018-11-29 PROCEDURE — 96368 THER/DIAG CONCURRENT INF: CPT

## 2018-11-29 PROCEDURE — 96375 TX/PRO/DX INJ NEW DRUG ADDON: CPT

## 2018-11-29 PROCEDURE — 96413 CHEMO IV INFUSION 1 HR: CPT

## 2018-11-29 PROCEDURE — 25000132 ZZH RX MED GY IP 250 OP 250 PS 637: Performed by: NURSE PRACTITIONER

## 2018-11-29 PROCEDURE — 84132 ASSAY OF SERUM POTASSIUM: CPT | Performed by: NURSE PRACTITIONER

## 2018-11-29 PROCEDURE — 83735 ASSAY OF MAGNESIUM: CPT | Performed by: NURSE PRACTITIONER

## 2018-11-29 PROCEDURE — 25000128 H RX IP 250 OP 636: Performed by: NURSE PRACTITIONER

## 2018-11-29 RX ORDER — HEPARIN SODIUM (PORCINE) LOCK FLUSH IV SOLN 100 UNIT/ML 100 UNIT/ML
500 SOLUTION INTRAVENOUS EVERY 8 HOURS
Status: DISCONTINUED | OUTPATIENT
Start: 2018-11-29 | End: 2018-11-29 | Stop reason: HOSPADM

## 2018-11-29 RX ORDER — DIPHENHYDRAMINE HCL 25 MG
25 CAPSULE ORAL ONCE
Status: COMPLETED | OUTPATIENT
Start: 2018-11-29 | End: 2018-11-29

## 2018-11-29 RX ADMIN — DEXAMETHASONE SODIUM PHOSPHATE 12 MG: 10 INJECTION, SOLUTION INTRAMUSCULAR; INTRAVENOUS at 08:52

## 2018-11-29 RX ADMIN — FAMOTIDINE 40 MG: 10 INJECTION INTRAVENOUS at 08:49

## 2018-11-29 RX ADMIN — SODIUM CHLORIDE 250 ML: 9 INJECTION, SOLUTION INTRAVENOUS at 08:46

## 2018-11-29 RX ADMIN — MAGNESIUM SULFATE HEPTAHYDRATE 2 G: 500 INJECTION, SOLUTION INTRAMUSCULAR; INTRAVENOUS at 09:12

## 2018-11-29 RX ADMIN — PACLITAXEL 138 MG: 6 INJECTION, SOLUTION INTRAVENOUS at 09:12

## 2018-11-29 RX ADMIN — SODIUM CHLORIDE, PRESERVATIVE FREE 500 UNITS: 5 INJECTION INTRAVENOUS at 10:33

## 2018-11-29 RX ADMIN — DIPHENHYDRAMINE HYDROCHLORIDE 25 MG: 25 CAPSULE ORAL at 08:47

## 2018-11-29 NOTE — PROGRESS NOTES
Infusion Nursing Note:  Odalys Nathan presents today for C3D22 taxol, magnesium.    Patient seen by provider today: No   present during visit today: Not Applicable.    Note: PO benadryl given.  Mag 1.2, 2 grams given.    Intravenous Access:  Labs drawn without difficulty.  Implanted Port.    Treatment Conditions:  Lab Results   Component Value Date    HGB 11.7 11/29/2018     Lab Results   Component Value Date    WBC 5.0 11/29/2018      Lab Results   Component Value Date    ANEU 2.1 11/29/2018     Lab Results   Component Value Date     11/29/2018      Lab Results   Component Value Date     11/08/2018                   Lab Results   Component Value Date    POTASSIUM 4.2 11/29/2018           Lab Results   Component Value Date    MAG 1.2 11/29/2018            Lab Results   Component Value Date    CR 0.78 11/08/2018                   Lab Results   Component Value Date    JOSE ALBERTO 9.3 11/08/2018                Lab Results   Component Value Date    BILITOTAL 0.3 11/08/2018           Lab Results   Component Value Date    ALBUMIN 3.6 11/08/2018                    Lab Results   Component Value Date    ALT 26 11/08/2018           Lab Results   Component Value Date    AST 21 11/08/2018       Results reviewed, labs MET treatment parameters, ok to proceed with treatment.      Post Infusion Assessment:  Patient tolerated infusion without incident.  Blood return noted pre and post infusion.  Site patent and intact, free from redness, edema or discomfort.  No evidence of extravasations.  Access discontinued per protocol.    Discharge Plan:   AVS to patient via MYCHART.  Patient will return 12/4/18 for next appointment.   Patient discharged in stable condition accompanied by: self.  Departure Mode: Ambulatory.    Sonia Pham RN

## 2018-11-29 NOTE — MR AVS SNAPSHOT
After Visit Summary   11/29/2018    Odalys Nathan    MRN: [id number]           Patient Information     Date Of Birth          1960        Visit Information        Provider Department      11/29/2018 8:00 AM RH INFUSION CHAIR 12 [facility]        Today's Diagnoses     Encounter for long-term (current) use of medications    -  1    Ovarian cancer, right (H)        Ovarian cancer, left (H)        Drug-induced neutropenia (H)           Follow-ups after your visit        Your next 10 appointments already scheduled     Dec 04, 2018  8:00 AM CST   Level 4 with RH INFUSION CHAIR 5   [facility] ([facility])    [facility]  [address]  [facility] [address]   [phone]            Dec 06, 2018  8:00 AM CST   Level 2 with RH INFUSION CHAIR 6   [facility] ([facility])    [facility]  [address]  [facility] [address]   [phone]            Dec 11, 2018  8:00 AM CST   Level 4 with RH INFUSION CHAIR 6   [facility] ([facility])    [facility]  [address]  [facility] [address]   [phone]            Dec 13, 2018  8:00 AM CST   Level 2 with RH INFUSION CHAIR 11   [facility] ([facility])    [facility]  [address]  [facility] [address]   [phone]            Dec 17, 2018  8:30 AM CST   CT CHEST/ABDOMEN/PELVIS W CONTRAST with RSCCCT1   [facility] ([facility])    [address]  [facility] [address]   [phone]           How do I prepare for my exam? (Food and drink instructions) To prepare: Do not eat or drink for 2 hours before your exam. If you  need to take medicine, you may take it with small sips of water. (We may ask you to take liquid medicine as well.)  How do I prepare for my exam? (Other instructions) Please arrive 30 minutes early for your CT.  Once in the department you might be asked to drink water 15-20 minutes prior to your exam.  If indicated you may be asked to drink an oral contrast in advance of your CT.  If this is the case, the imaging team will let you know or be in contact with you prior to your appointment  Patients over 70 or patients with diabetes or kidney problems: If you haven t had a blood test (creatinine test) within the last 30 days, the Cardiologist/Radiologist may require you to get this test prior to your exam.  If you have diabetes:  Continue to take your metformin medication on the day of your exam  What should I wear: Please wear loose clothing, such as a sweat suit or jogging clothes. Avoid snaps, zippers and other metal. We may ask you to undress and put on a hospital gown.  How long does the exam take: Most scans take less than 20 minutes.  What should I bring: Please bring any scans or X-rays taken at other hospitals, if similar tests were done. Also bring a list of your medicines, including vitamins, minerals and over-the-counter drugs. It is safest to leave personal items at home.  Do I need a : No  is needed.  What do I need to tell my doctor? Be sure to tell your doctor: * If you have any allergies. * If there s any chance you are pregnant. * If you are breastfeeding.  What should I do after the exam: No restrictions, You may resume normal activities.  What is this test: A CT (computed tomography) scan is a series of pictures that allows us to look inside your body. The scanner creates images of the body in cross sections, much like slices of bread. This helps us see any problems more clearly. You may receive contrast (X-ray dye) before or during your scan. You will be asked to drink the contrast.   Who should I call with questions: If you have any questions, please call the Imaging Department where you will have your exam. Directions, parking instructions, and other information is available on our website, [email or web address].            Dec 20, 2018  1:35 PM CST   (Arrive by 1:20 PM)   Return Visit with Nga Yeung MD   [facility] ([facility])    [address] [place] [address]  [address]  [facility] [address] [phone]              Who to contact     If you have questions or need follow up information about today's clinic visit or your schedule please contact [facility] directly at [phone].  Normal or non-critical lab and imaging results will be communicated to you by MyChart, letter or phone within 4 business days after the clinic has received the results. If you do not hear from us within 7 days, please contact the clinic through [employer] or phone. If you have a critical or abnormal lab result, we will notify you by phone as soon as possible.  Submit refill requests through [employer] or call your pharmacy and they will forward the refill request to us. Please allow 3 business days for your refill to be completed.          Additional Information About Your Visit        MyChart Information     [employer] gives you secure access to your electronic health record. If you see a primary care provider, you can also send messages to your care team and make appointments. If you have questions, please call your primary care clinic.  If you do not have a primary care provider, please call [phone] and they will assist you.        Care EveryWhere ID     This is your Care EveryWhere ID. This could be used by other organizations to access your [place] medical records  [id number]        Your Vitals Were     Pulse Temperature Respirations Pulse Oximetry BMI (Body Mass Index)       80 97.7  F (36.5  C) (Tympanic) 16 97% 24.18  kg/m2        Blood Pressure from Last 3 Encounters:   11/29/18 102/69   11/27/18 125/73   11/23/18 117/71    Weight from Last 3 Encounters:   11/29/18 65.9 kg (145 lb 4.8 oz)   11/23/18 63.5 kg (140 lb)   11/15/18 65.5 kg (144 lb 4.8 oz)              We Performed the Following     CBC with platelets differential     Magnesium     Potassium        Primary Care Provider Office Phone # Fax #    Aubrie Airam Hester [phone]       [facility] [address]  [facility] [address]        Equal Access to Services     [facility] Hospital[facility] : Hadii aad esther Walter, wajeovanny mukherjee, qaybta kaalmada bel, blanka beckman . So [facility] [phone].    ATENCIÓN: Si habla español, tiene a bell disposición servicios gratuitos de asistencia lingüística. LlFayette [facility] Memorial Hospital [phone].    We comply with applicable federal civil rights laws and [place] laws. We do not discriminate on the basis of race, color, national origin, age, disability, sex, sexual orientation, or gender identity.            Thank you!     Thank you for choosing [facility]  for your care. Our goal is always to provide you with excellent care. Hearing back from our patients is one way we can continue to improve our services. Please take a few minutes to complete the written survey that you may receive in the mail after your visit with us. Thank you!             Your Updated Medication List - Protect others around you: Learn how to safely use, store and throw away your medicines at www.disposemymeds.org.          This list is accurate as of 11/29/18 11:29 AM.  Always use your most recent med list.                   Brand Name Dispense Instructions for use Diagnosis    albuterol 108 (90 Base) MCG/ACT inhaler    PROAIR HFA/PROVENTIL HFA/VENTOLIN HFA    1 Inhaler    Inhale 2 puffs into the lungs every 6 hours as needed for shortness of breath / dyspnea or wheezing    Ovarian  cancer, unspecified laterality (H), Wheezing       CALCIUM + D PO      Take 1 tablet by mouth daily.    Pelvic mass       diphenoxylate-atropine 2.5-0.025 MG tablet    LOMOTIL    60 tablet    Take 2 tablets by mouth 4 times daily as needed for diarrhea    Acute diarrhea       Ferrous Sulfate 324 (65 Fe) MG Tbec     90 tablet    TAKE ONE TABLET BY MOUTH THREE TIMES DAILY WITH MEALS. TAKE WITH A SMALL AMOUNT OF ORANGE JUICE. DO NOT TAKE WITH CALCIUM    Ovarian cancer, right (H), Anemia, unspecified type, Ovarian cancer, left (H)       HERBALS      daily        LEVOTHYROXINE SODIUM PO      Take by mouth daily        loperamide 2 MG capsule    IMODIUM     Take 2 mg by mouth 4 times daily as needed for diarrhea        LORazepam 1 MG tablet    ATIVAN    30 tablet    Take 1 tablet (1 mg) by mouth every 6 hours as needed (Anxiety, Nausea/Vomiting or Sleep)    Ovarian cancer, unspecified laterality (H)       magnesium oxide 400 MG tablet    MAG-OX    90 tablet    Take 1 tablet (400 mg) by mouth 2 times daily    Ovarian cancer, unspecified laterality (H)       omeprazole 20 MG DR capsule    priLOSEC    30 capsule    Take 1 capsule (20 mg) by mouth daily    Dyspepsia       order for DME     3 each    Injection Supplies for Vitamin B12: 3cc syringes w/ 27 gauge needles, 1/2 inch length    B12 deficiency       potassium chloride 20 MEQ packet    KLOR-CON     Take 20 mEq by mouth daily        vitamin B-12 1000 MCG/ML injection    CYANOCOBALAMIN    1 mL    Inject 1 mL (1,000 mcg) into the muscle every 30 days    B12 deficiency       VITAMIN C PO      Take 500 mg by mouth daily    Pelvic mass       VITAMIN E NATURAL PO      Take 100 Units by mouth daily

## 2018-12-04 ENCOUNTER — HOSPITAL ENCOUNTER (OUTPATIENT)
Facility: CLINIC | Age: 58
Setting detail: SPECIMEN
Discharge: HOME | End: 2018-12-04
Attending: NURSE PRACTITIONER | Admitting: NURSE PRACTITIONER
Payer: COMMERCIAL

## 2018-12-04 ENCOUNTER — INFUSION THERAPY VISIT (OUTPATIENT)
Dept: INFUSION THERAPY | Facility: CLINIC | Age: 58
End: 2018-12-04
Attending: OBSTETRICS & GYNECOLOGY
Payer: COMMERCIAL

## 2018-12-04 VITALS
TEMPERATURE: 97.8 F | RESPIRATION RATE: 16 BRPM | SYSTOLIC BLOOD PRESSURE: 118 MMHG | DIASTOLIC BLOOD PRESSURE: 59 MMHG | HEART RATE: 97 BPM

## 2018-12-04 DIAGNOSIS — E83.42 HYPOMAGNESEMIA: ICD-10-CM

## 2018-12-04 LAB — MAGNESIUM SERPL-MCNC: 1.2 MG/DL (ref 1.6–2.3)

## 2018-12-04 PROCEDURE — 96365 THER/PROPH/DIAG IV INF INIT: CPT

## 2018-12-04 PROCEDURE — 25000128 H RX IP 250 OP 636: Performed by: OBSTETRICS & GYNECOLOGY

## 2018-12-04 PROCEDURE — 83735 ASSAY OF MAGNESIUM: CPT | Performed by: NURSE PRACTITIONER

## 2018-12-04 RX ORDER — HEPARIN SODIUM (PORCINE) LOCK FLUSH IV SOLN 100 UNIT/ML 100 UNIT/ML
500 SOLUTION INTRAVENOUS DAILY PRN
Status: CANCELLED
Start: 2018-12-04

## 2018-12-04 RX ADMIN — MAGNESIUM SULFATE HEPTAHYDRATE 2 G: 500 INJECTION, SOLUTION INTRAMUSCULAR; INTRAVENOUS at 08:44

## 2018-12-04 NOTE — PROGRESS NOTES
Infusion Nursing Note:  Odalys Nathan presents today for IV Magnesium.    Patient seen by provider today: No   present during visit today: Not Applicable.    Note: N/A.    Intravenous Access:  Labs drawn without difficulty.  Implanted Port.    Treatment Conditions:  Lab Results   Component Value Date     11/08/2018                   Lab Results   Component Value Date    POTASSIUM 4.2 11/29/2018           Lab Results   Component Value Date    MAG 1.2 12/04/2018            Lab Results   Component Value Date    CR 0.78 11/08/2018                   Lab Results   Component Value Date    JOSE ALBERTO 9.3 11/08/2018                Lab Results   Component Value Date    BILITOTAL 0.3 11/08/2018           Lab Results   Component Value Date    ALBUMIN 3.6 11/08/2018                    Lab Results   Component Value Date    ALT 26 11/08/2018           Lab Results   Component Value Date    AST 21 11/08/2018       Results reviewed, labs MET treatment parameters, ok to proceed with treatment.    Post Infusion Assessment:  Patient tolerated infusion without incident.  Blood return noted pre and post infusion.  Site patent and intact, free from redness, edema or discomfort.  No evidence of extravasations.  Access discontinued per protocol.    Discharge Plan:   Discharge instructions reviewed with: Patient.  Patient and/or family verbalized understanding of discharge instructions and all questions answered.  AVS to patient via [employer].  Patient will return 12/6/18 for next appointment.   Patient discharged in stable condition accompanied by: self.  Departure Mode: Ambulatory.    Gloria Proctor RN

## 2018-12-04 NOTE — MR AVS SNAPSHOT
After Visit Summary   12/4/2018    Odalys Nathan    MRN: [id number]           Patient Information     Date Of Birth          1960        Visit Information        Provider Department      12/4/2018 8:00 AM RH INFUSION CHAIR 5 [facility]        Today's Diagnoses     Hypomagnesemia           Follow-ups after your visit        Your next 10 appointments already scheduled     Dec 06, 2018  8:00 AM CST   Level 2 with RH INFUSION CHAIR 6   [facility] ([facility])    [facility]  [address]  [facility] [address]   [phone]            Dec 11, 2018  8:00 AM CST   Level 4 with RH INFUSION CHAIR 6   [facility] ([facility])    [facility]  [address]  [facility] [address]   [phone]            Dec 13, 2018  8:00 AM CST   Level 2 with RH INFUSION CHAIR 11   [facility] ([facility])    [facility]  [address]  [facility] [address]   [phone]            Dec 17, 2018  8:30 AM CST   CT CHEST/ABDOMEN/PELVIS W CONTRAST with RSCCCT1   [facility] ([facility])    [address]  [facility] [address]   [phone]           How do I prepare for my exam? (Food and drink instructions) To prepare: Do not eat or drink for 2 hours before your exam. If you need to take medicine, you may take it with small sips of water. (We may ask you to take liquid medicine as well.)  How do I prepare for my exam? (Other instructions) Please arrive 30 minutes early for your CT.  Once in the department you might be asked to drink water 15-20 minutes prior to your exam.  If indicated you may be asked to drink an oral contrast in advance of your CT.  If this is the  case, the imaging team will let you know or be in contact with you prior to your appointment  Patients over 70 or patients with diabetes or kidney problems: If you haven t had a blood test (creatinine test) within the last 30 days, the Cardiologist/Radiologist may require you to get this test prior to your exam.  If you have diabetes:  Continue to take your metformin medication on the day of your exam  What should I wear: Please wear loose clothing, such as a sweat suit or jogging clothes. Avoid snaps, zippers and other metal. We may ask you to undress and put on a hospital gown.  How long does the exam take: Most scans take less than 20 minutes.  What should I bring: Please bring any scans or X-rays taken at other hospitals, if similar tests were done. Also bring a list of your medicines, including vitamins, minerals and over-the-counter drugs. It is safest to leave personal items at home.  Do I need a : No  is needed.  What do I need to tell my doctor? Be sure to tell your doctor: * If you have any allergies. * If there s any chance you are pregnant. * If you are breastfeeding.  What should I do after the exam: No restrictions, You may resume normal activities.  What is this test: A CT (computed tomography) scan is a series of pictures that allows us to look inside your body. The scanner creates images of the body in cross sections, much like slices of bread. This helps us see any problems more clearly. You may receive contrast (X-ray dye) before or during your scan. You will be asked to drink the contrast.  Who should I call with questions: If you have any questions, please call the Imaging Department where you will have your exam. Directions, parking instructions, and other information is available on our website, [email or web address].            Dec 20, 2018  1:35 PM CST   (Arrive by 1:20 PM)   Return Visit with Nga Yeung MD   [facility] ([facility])    [address]  [address]  [facility] [address] [phone]              Who to contact     If you have questions or need follow up information about today's clinic visit or your schedule please contact [facility] directly at [phone].  Normal or non-critical lab and imaging results will be communicated to you by MyChart, letter or phone within 4 business days after the clinic has received the results. If you do not hear from us within 7 days, please contact the clinic through MyChart or phone. If you have a critical or abnormal lab result, we will notify you by phone as soon as possible.  Submit refill requests through [employer] or call your pharmacy and they will forward the refill request to us. Please allow 3 business days for your refill to be completed.          Additional Information About Your Visit        MyChart Information     [employer] gives you secure access to your electronic health record. If you see a primary care provider, you can also send messages to your care team and make appointments. If you have questions, please call your primary care clinic.  If you do not have a primary care provider, please call [phone] and they will assist you.        Care EveryWhere ID     This is your Care EveryWhere ID. This could be used by other organizations to access your [place] medical records  [id number]        Your Vitals Were     Pulse Temperature Respirations             97 97.8  F (36.6  C) (Tympanic) 16          Blood Pressure from Last 3 Encounters:   12/04/18 118/59   11/29/18 102/69   11/27/18 125/73    Weight from Last 3 Encounters:   11/29/18 65.9 kg (145 lb 4.8 oz)   11/23/18 63.5 kg (140 lb)   11/15/18 65.5 kg (144 lb 4.8 oz)              We Performed the Following     [facility]        Primary Care Provider Office Phone # Fax #    Aubrie Hester [phone]       [facility] [address]  [facility] [address]        Equal Access to Services     [facility] TRISH : Hadii bj santana naima Walter, waaxda luqadaha, qaybta kaalmada bel, waxisabelle roselyn esquivelmotoshia szymanski. So [facility] [phone].    ATENCIÓN: Si habla español, tiene a bell disposición servicios gratuitos de asistencia lingüística. Liban al [phone].    We comply with applicable federal civil rights laws and [place] laws. We do not discriminate on the basis of race, color, national origin, age, disability, sex, sexual orientation, or gender identity.            Thank you!     Thank you for choosing [facility]  for your care. Our goal is always to provide you with excellent care. Hearing back from our patients is one way we can continue to improve our services. Please take a few minutes to complete the written survey that you may receive in the mail after your visit with us. Thank you!             Your Updated Medication List - Protect others around you: Learn how to safely use, store and throw away your medicines at www.disposemymeds.org.          This list is accurate as of 12/4/18 10:00 AM.  Always use your most recent med list.                   Brand Name Dispense Instructions for use Diagnosis    albuterol 108 (90 Base) MCG/ACT inhaler    PROAIR HFA/PROVENTIL HFA/VENTOLIN HFA    1 Inhaler    Inhale 2 puffs into the lungs every 6 hours as needed for shortness of breath / dyspnea or wheezing    Ovarian cancer, unspecified laterality (H), Wheezing       CALCIUM + D PO      Take 1 tablet by mouth daily.    Pelvic mass       diphenoxylate-atropine 2.5-0.025 MG tablet    LOMOTIL    60 tablet    Take 2 tablets by mouth 4 times daily as needed for diarrhea    Acute diarrhea       Ferrous Sulfate 324 (65 Fe) MG Tbec     90 tablet    TAKE ONE TABLET BY MOUTH THREE TIMES DAILY WITH MEALS. TAKE WITH A SMALL AMOUNT OF ORANGE JUICE. DO NOT TAKE WITH CALCIUM    Ovarian cancer, right (H), Anemia, unspecified  type, Ovarian cancer, left (H)       HERBALS      daily        LEVOTHYROXINE SODIUM PO      Take by mouth daily        loperamide 2 MG capsule    IMODIUM     Take 2 mg by mouth 4 times daily as needed for diarrhea        LORazepam 1 MG tablet    ATIVAN    30 tablet    Take 1 tablet (1 mg) by mouth every 6 hours as needed (Anxiety, Nausea/Vomiting or Sleep)    Ovarian cancer, unspecified laterality (H)       magnesium oxide 400 MG tablet    MAG-OX    90 tablet    Take 1 tablet (400 mg) by mouth 2 times daily    Ovarian cancer, unspecified laterality (H)       omeprazole 20 MG DR capsule    priLOSEC    30 capsule    Take 1 capsule (20 mg) by mouth daily    Dyspepsia       order for DME     3 each    Injection Supplies for Vitamin B12: 3cc syringes w/ 27 gauge needles, 1/2 inch length    B12 deficiency       potassium chloride 20 MEQ packet    KLOR-CON     Take 20 mEq by mouth daily        vitamin B-12 1000 MCG/ML injection    CYANOCOBALAMIN    1 mL    Inject 1 mL (1,000 mcg) into the muscle every 30 days    B12 deficiency       VITAMIN C PO      Take 500 mg by mouth daily    Pelvic mass       VITAMIN E NATURAL PO      Take 100 Units by mouth daily

## 2018-12-06 ENCOUNTER — HOSPITAL ENCOUNTER (OUTPATIENT)
Facility: CLINIC | Age: 58
Setting detail: SPECIMEN
Discharge: HOME | End: 2018-12-06
Attending: NURSE PRACTITIONER | Admitting: NURSE PRACTITIONER
Payer: COMMERCIAL

## 2018-12-06 ENCOUNTER — INFUSION THERAPY VISIT (OUTPATIENT)
Dept: INFUSION THERAPY | Facility: CLINIC | Age: 58
End: 2018-12-06
Attending: NURSE PRACTITIONER
Payer: COMMERCIAL

## 2018-12-06 VITALS
BODY MASS INDEX: 24.21 KG/M2 | HEART RATE: 78 BPM | SYSTOLIC BLOOD PRESSURE: 133 MMHG | OXYGEN SATURATION: 99 % | DIASTOLIC BLOOD PRESSURE: 82 MMHG | TEMPERATURE: 99.1 F | RESPIRATION RATE: 16 BRPM | WEIGHT: 145.5 LBS

## 2018-12-06 DIAGNOSIS — D70.2 DRUG-INDUCED NEUTROPENIA (H): ICD-10-CM

## 2018-12-06 DIAGNOSIS — C56.2 OVARIAN CANCER, LEFT (H): ICD-10-CM

## 2018-12-06 DIAGNOSIS — Z79.899 ENCOUNTER FOR LONG-TERM (CURRENT) USE OF MEDICATIONS: Primary | ICD-10-CM

## 2018-12-06 DIAGNOSIS — C56.1 OVARIAN CANCER, RIGHT (H): ICD-10-CM

## 2018-12-06 LAB
BASOPHILS # BLD AUTO: 0 10E9/L (ref 0–0.2)
BASOPHILS NFR BLD AUTO: 0.7 %
DIFFERENTIAL METHOD BLD: ABNORMAL
EOSINOPHIL # BLD AUTO: 0.2 10E9/L (ref 0–0.7)
EOSINOPHIL NFR BLD AUTO: 2.7 %
ERYTHROCYTE [DISTWIDTH] IN BLOOD BY AUTOMATED COUNT: 14.6 % (ref 10–15)
HCT VFR BLD AUTO: 35.2 % (ref 35–47)
HGB BLD-MCNC: 11.4 G/DL (ref 11.7–15.7)
IMM GRANULOCYTES # BLD: 0.1 10E9/L (ref 0–0.4)
IMM GRANULOCYTES NFR BLD: 0.9 %
LYMPHOCYTES # BLD AUTO: 2.4 10E9/L (ref 0.8–5.3)
LYMPHOCYTES NFR BLD AUTO: 43.9 %
MAGNESIUM SERPL-MCNC: 1.2 MG/DL (ref 1.6–2.3)
MCH RBC QN AUTO: 34.8 PG (ref 26.5–33)
MCHC RBC AUTO-ENTMCNC: 32.4 G/DL (ref 31.5–36.5)
MCV RBC AUTO: 107 FL (ref 78–100)
MONOCYTES # BLD AUTO: 0.5 10E9/L (ref 0–1.3)
MONOCYTES NFR BLD AUTO: 9.5 %
NEUTROPHILS # BLD AUTO: 2.3 10E9/L (ref 1.6–8.3)
NEUTROPHILS NFR BLD AUTO: 42.3 %
NRBC # BLD AUTO: 0 10*3/UL
NRBC BLD AUTO-RTO: 0 /100
PLATELET # BLD AUTO: 289 10E9/L (ref 150–450)
POTASSIUM SERPL-SCNC: 3.8 MMOL/L (ref 3.4–5.3)
RBC # BLD AUTO: 3.28 10E12/L (ref 3.8–5.2)
WBC # BLD AUTO: 5.5 10E9/L (ref 4–11)

## 2018-12-06 PROCEDURE — 96413 CHEMO IV INFUSION 1 HR: CPT

## 2018-12-06 PROCEDURE — 25000128 H RX IP 250 OP 636: Performed by: NURSE PRACTITIONER

## 2018-12-06 PROCEDURE — 83735 ASSAY OF MAGNESIUM: CPT | Performed by: NURSE PRACTITIONER

## 2018-12-06 PROCEDURE — 96368 THER/DIAG CONCURRENT INF: CPT

## 2018-12-06 PROCEDURE — 84132 ASSAY OF SERUM POTASSIUM: CPT | Performed by: NURSE PRACTITIONER

## 2018-12-06 PROCEDURE — 96375 TX/PRO/DX INJ NEW DRUG ADDON: CPT

## 2018-12-06 PROCEDURE — 25000125 ZZHC RX 250: Performed by: NURSE PRACTITIONER

## 2018-12-06 PROCEDURE — 25000132 ZZH RX MED GY IP 250 OP 250 PS 637: Performed by: NURSE PRACTITIONER

## 2018-12-06 PROCEDURE — 85025 COMPLETE CBC W/AUTO DIFF WBC: CPT | Performed by: NURSE PRACTITIONER

## 2018-12-06 RX ORDER — HEPARIN SODIUM (PORCINE) LOCK FLUSH IV SOLN 100 UNIT/ML 100 UNIT/ML
500 SOLUTION INTRAVENOUS EVERY 8 HOURS
Status: DISCONTINUED | OUTPATIENT
Start: 2018-12-06 | End: 2018-12-06 | Stop reason: HOSPADM

## 2018-12-06 RX ORDER — DIPHENHYDRAMINE HCL 25 MG
25 CAPSULE ORAL ONCE
Status: COMPLETED | OUTPATIENT
Start: 2018-12-06 | End: 2018-12-06

## 2018-12-06 RX ADMIN — SODIUM CHLORIDE, PRESERVATIVE FREE 500 UNITS: 5 INJECTION INTRAVENOUS at 10:48

## 2018-12-06 RX ADMIN — DEXAMETHASONE SODIUM PHOSPHATE 12 MG: 10 INJECTION, SOLUTION INTRAMUSCULAR; INTRAVENOUS at 09:10

## 2018-12-06 RX ADMIN — SODIUM CHLORIDE 250 ML: 9 INJECTION, SOLUTION INTRAVENOUS at 09:04

## 2018-12-06 RX ADMIN — DIPHENHYDRAMINE HYDROCHLORIDE 25 MG: 25 CAPSULE ORAL at 09:04

## 2018-12-06 RX ADMIN — MAGNESIUM SULFATE HEPTAHYDRATE 2 G: 500 INJECTION, SOLUTION INTRAMUSCULAR; INTRAVENOUS at 09:28

## 2018-12-06 RX ADMIN — FAMOTIDINE 40 MG: 10 INJECTION, SOLUTION INTRAVENOUS at 09:06

## 2018-12-06 RX ADMIN — PACLITAXEL 138 MG: 6 INJECTION, SOLUTION INTRAVENOUS at 09:43

## 2018-12-06 ASSESSMENT — PAIN SCALES - GENERAL: PAINLEVEL: NO PAIN (0)

## 2018-12-06 NOTE — MR AVS SNAPSHOT
After Visit Summary   12/6/2018    Odalys Nathan    MRN: [id number]           Patient Information     Date Of Birth          1960        Visit Information        Provider Department      12/6/2018 8:00 AM RH INFUSION CHAIR 6 [facility]        Today's Diagnoses     Encounter for long-term (current) use of medications    -  1    Ovarian cancer, right (H)        Ovarian cancer, left (H)        Drug-induced neutropenia (H)          Care Instructions    EDUCATION POST CHEMOTHERAPY INFUSION  Call the triage nurse at your clinic or seek medical attention if you have chills and/or temperature greater than or equal to 100.5, uncontrolled nausea/vomiting, diarrhea, constipation, dizziness, shortness of breath, chest pain, heart palpitations, weakness or any other new or concerning symptoms, questions or concerns.  You can not have any live virus vaccines prior to or during treatment or up to 6 months post infusion.  If you have an upcoming surgery, medical procedure or dental procedure during treatment, this should be discussed with your ordering physician and your surgeon/dentist.  If you are having any concerning symptom, if you are unsure if you should get your next infusion or wish to speak to a provider before your next infusion, please call your care coordinator or triage nurse at your clinic to notify them so we can adequately serve you.          Follow-ups after your visit        Your next 10 appointments already scheduled     Dec 11, 2018  8:00 AM CST   Level 4 with RH INFUSION CHAIR 6   [facility] ([facility])    [facility]  [address]  [place] [address]   [phone]            Dec 13, 2018  8:00 AM CST   Level 2 with RH INFUSION CHAIR 11   [facility] ([facility])    [facility]  [address]  [facility] [address]   [phone]            Dec 17, 2018  8:30 AM CST   CT CHEST/ABDOMEN/PELVIS W CONTRAST with RSCCCT1   [facility] ([facility])    [address]  [facility] [address]   [phone]           How do I prepare for my exam? (Food and drink instructions) To prepare: Do not eat or drink for 2 hours before your exam. If you need to take medicine, you may take it with small sips of water. (We may ask you to take liquid medicine as well.)  How do I prepare for my exam? (Other instructions) Please arrive 30 minutes early for your CT.  Once in the department you might be asked to drink water 15-20 minutes prior to your exam.  If indicated you may be asked to drink an oral contrast in advance of your CT.  If this is the case, the imaging team will let you know or be in contact with you prior to your appointment  Patients over 70 or patients with diabetes or kidney problems: If you haven t had a blood test (creatinine test) within the last 30 days, the Cardiologist/Radiologist may require you to get this test prior to your exam.  If you have diabetes:  Continue to take your metformin medication on the day of your exam  What should I wear: Please wear loose clothing, such as a sweat suit or jogging clothes. Avoid snaps, zippers and other metal. We may ask you to undress and put on a hospital gown.  How long does the exam take: Most scans take less than 20 minutes.  What should I bring: Please bring any scans or X-rays taken at other hospitals, if similar tests were done. Also bring a list of your medicines, including vitamins, minerals and over-the-counter drugs. It is safest to leave personal items at home.  Do I need a : No  is needed.  What do I need to tell my doctor? Be sure to tell your doctor: * If you have any allergies. * If there s any chance you are pregnant. * If you are breastfeeding.  What should I  do after the exam: No restrictions, You may resume normal activities.  What is this test: A CT (computed tomography) scan is a series of pictures that allows us to look inside your body. The scanner creates images of the body in cross sections, much like slices of bread. This helps us see any problems more clearly. You may receive contrast (X-ray dye) before or during your scan. You will be asked to drink the contrast.  Who should I call with questions: If you have any questions, please call the Imaging Department where you will have your exam. Directions, parking instructions, and other information is available on our website, [email or web address].            Dec 20, 2018  1:35 PM CST   (Arrive by 1:20 PM)   Return Visit with Nga Yeung MD   [facility] ([facility])    [address] [place] [address]  [address]  [facility] [address] [phone]              Who to contact     If you have questions or need follow up information about today's clinic visit or your schedule please contact [facility] directly at [phone].  Normal or non-critical lab and imaging results will be communicated to you by MyChart, letter or phone within 4 business days after the clinic has received the results. If you do not hear from us within 7 days, please contact the clinic through [employer] or phone. If you have a critical or abnormal lab result, we will notify you by phone as soon as possible.  Submit refill requests through [email or web address] or call your pharmacy and they will forward the refill request to us. Please allow 3 business days for your refill to be completed.          Additional Information About Your Visit        [employer] Information     [email or web address] gives you secure access to your electronic health record. If you see a primary care provider, you can also send messages to your care team and make appointments. If you have questions, please call your  primary care clinic.  If you do not have a primary care provider, please call [phone] and they will assist you.        Care EveryWhere ID     This is your Care EveryWhere ID. This could be used by other organizations to access your [facility] medical records  [id number]        Your Vitals Were     Pulse Temperature Respirations Pulse Oximetry BMI (Body Mass Index)       78 99.1  F (37.3  C) (Tympanic) 16 99% 24.21 kg/m2        Blood Pressure from Last 3 Encounters:   12/06/18 133/82   12/04/18 118/59   11/29/18 102/69    Weight from Last 3 Encounters:   12/06/18 66 kg (145 lb 8 oz)   11/29/18 65.9 kg (145 lb 4.8 oz)   11/23/18 63.5 kg (140 lb)              We Performed the Following     CBC with platelets differential     Magnesium     Potassium        Primary Care Provider Office Phone # Fax #    Aubrie Hester [phone]       [facility] [address] [address] [address]        Equal Access to Services     [facility]: Hadii aad ku hadasho Soomaali, waaxda luqadaha, qaybta kaalmada adeegyajulieta, blanka beckman . So [facility] [phone].    ATENCIÓN: Si habla español, tiene a bell disposición servicios gratuitos de asistencia lingüística. [facility] [phone].    We comply with applicable federal civil rights laws and [place] laws. We do not discriminate on the basis of race, color, national origin, age, disability, sex, sexual orientation, or gender identity.            Thank you!     Thank you for choosing [facility]  for your care. Our goal is always to provide you with excellent care. Hearing back from our patients is one way we can continue to improve our services. Please take a few minutes to complete the written survey that you may receive in the mail after your visit with us. Thank you!             Your Updated Medication List - Protect others around you: Learn how to safely use, store and throw away  your medicines at www.disposemymeds.org.          This list is accurate as of 12/6/18 12:49 PM.  Always use your most recent med list.                   Brand Name Dispense Instructions for use Diagnosis    albuterol 108 (90 Base) MCG/ACT inhaler    PROAIR HFA/PROVENTIL HFA/VENTOLIN HFA    1 Inhaler    Inhale 2 puffs into the lungs every 6 hours as needed for shortness of breath / dyspnea or wheezing    Ovarian cancer, unspecified laterality (H), Wheezing       CALCIUM + D PO      Take 1 tablet by mouth daily.    Pelvic mass       diphenoxylate-atropine 2.5-0.025 MG tablet    LOMOTIL    60 tablet    Take 2 tablets by mouth 4 times daily as needed for diarrhea    Acute diarrhea       Ferrous Sulfate 324 (65 Fe) MG Tbec     90 tablet    TAKE ONE TABLET BY MOUTH THREE TIMES DAILY WITH MEALS. TAKE WITH A SMALL AMOUNT OF ORANGE JUICE. DO NOT TAKE WITH CALCIUM    Ovarian cancer, right (H), Anemia, unspecified type, Ovarian cancer, left (H)       HERBALS      daily        LEVOTHYROXINE SODIUM PO      Take by mouth daily        loperamide 2 MG capsule    IMODIUM     Take 2 mg by mouth 4 times daily as needed for diarrhea        LORazepam 1 MG tablet    ATIVAN    30 tablet    Take 1 tablet (1 mg) by mouth every 6 hours as needed (Anxiety, Nausea/Vomiting or Sleep)    Ovarian cancer, unspecified laterality (H)       magnesium oxide 400 MG tablet    MAG-OX    90 tablet    Take 1 tablet (400 mg) by mouth 2 times daily    Ovarian cancer, unspecified laterality (H)       omeprazole 20 MG DR capsule    priLOSEC    30 capsule    Take 1 capsule (20 mg) by mouth daily    Dyspepsia       order for DME     3 each    Injection Supplies for Vitamin B12: 3cc syringes w/ 27 gauge needles, 1/2 inch length    B12 deficiency       potassium chloride 20 MEQ packet    KLOR-CON     Take 20 mEq by mouth daily        vitamin B-12 1000 MCG/ML injection    CYANOCOBALAMIN    1 mL    Inject 1 mL (1,000 mcg) into the muscle every 30 days    B12  deficiency       VITAMIN C PO      Take 500 mg by mouth daily    Pelvic mass       VITAMIN E NATURAL PO      Take 100 Units by mouth daily

## 2018-12-06 NOTE — PROGRESS NOTES
Infusion Nursing Note:  Odalys Nathan presents today for C3 D29 Taxol.    Patient seen by provider today: No   present during visit today: Not Applicable.    Note: In the past week, patient has slipped twice on the ice and fallen while walking the dog. States her tailbone is sore, but otherwise no injuries. Tuesday, she was closing the back end of her vehicle, and while it was coming down it hit her forehead. She currently has a scab on her L forehead and bruising near her L eye. She denies any loss of consciousness, dizziness, or other effects. She did not seek care and declines to be further evaluated today.    Intravenous Access:  Labs drawn without difficulty.  Implanted Port.    Treatment Conditions:  Lab Results   Component Value Date    HGB 11.4 12/06/2018     Lab Results   Component Value Date    WBC 5.5 12/06/2018      Lab Results   Component Value Date    ANEU 2.3 12/06/2018     Lab Results   Component Value Date     12/06/2018      Lab Results   Component Value Date     11/08/2018                   Lab Results   Component Value Date    POTASSIUM 3.8 12/06/2018           Lab Results   Component Value Date    MAG 1.2 12/06/2018            Lab Results   Component Value Date    CR 0.78 11/08/2018                   Lab Results   Component Value Date    JOSE ALBERTO 9.3 11/08/2018                Lab Results   Component Value Date    BILITOTAL 0.3 11/08/2018           Lab Results   Component Value Date    ALBUMIN 3.6 11/08/2018                    Lab Results   Component Value Date    ALT 26 11/08/2018           Lab Results   Component Value Date    AST 21 11/08/2018       Results reviewed, labs MET treatment parameters, ok to proceed with treatment.    Magnesium replaced today.      Post Infusion Assessment:  Patient tolerated infusion without incident.  Blood return noted pre and post infusion.  Site patent and intact, free from redness, edema or discomfort.  No evidence of  extravasations.  Access discontinued per protocol.    Discharge Plan:   Discharge instructions reviewed with: Patient.  Patient and/or family verbalized understanding of discharge instructions and all questions answered.  AVS to patient via SellywhereHART.  Patient will return 12/11/18 for next appointment.   Patient discharged in stable condition accompanied by: .  Departure Mode: Ambulatory.    Elly Mcfadden RN

## 2018-12-11 ENCOUNTER — HOSPITAL ENCOUNTER (OUTPATIENT)
Facility: CLINIC | Age: 58
Setting detail: SPECIMEN
Discharge: HOME | End: 2018-12-11
Attending: NURSE PRACTITIONER | Admitting: NURSE PRACTITIONER
Payer: COMMERCIAL

## 2018-12-11 ENCOUNTER — INFUSION THERAPY VISIT (OUTPATIENT)
Dept: INFUSION THERAPY | Facility: CLINIC | Age: 58
End: 2018-12-11
Attending: OBSTETRICS & GYNECOLOGY
Payer: COMMERCIAL

## 2018-12-11 VITALS
HEART RATE: 69 BPM | OXYGEN SATURATION: 98 % | SYSTOLIC BLOOD PRESSURE: 111 MMHG | TEMPERATURE: 98.2 F | DIASTOLIC BLOOD PRESSURE: 58 MMHG

## 2018-12-11 DIAGNOSIS — C56.2 OVARIAN CANCER, LEFT (H): ICD-10-CM

## 2018-12-11 DIAGNOSIS — C56.1 OVARIAN CANCER, RIGHT (H): ICD-10-CM

## 2018-12-11 DIAGNOSIS — E83.42 HYPOMAGNESEMIA: Primary | ICD-10-CM

## 2018-12-11 LAB — MAGNESIUM SERPL-MCNC: 1.1 MG/DL (ref 1.6–2.3)

## 2018-12-11 PROCEDURE — 83735 ASSAY OF MAGNESIUM: CPT | Performed by: NURSE PRACTITIONER

## 2018-12-11 PROCEDURE — 25000128 H RX IP 250 OP 636: Performed by: NURSE PRACTITIONER

## 2018-12-11 PROCEDURE — 96365 THER/PROPH/DIAG IV INF INIT: CPT

## 2018-12-11 PROCEDURE — 25000128 H RX IP 250 OP 636: Performed by: OBSTETRICS & GYNECOLOGY

## 2018-12-11 PROCEDURE — 96366 THER/PROPH/DIAG IV INF ADDON: CPT

## 2018-12-11 RX ORDER — HEPARIN SODIUM (PORCINE) LOCK FLUSH IV SOLN 100 UNIT/ML 100 UNIT/ML
500 SOLUTION INTRAVENOUS DAILY PRN
Status: CANCELLED
Start: 2018-12-11

## 2018-12-11 RX ORDER — MAGNESIUM SULFATE HEPTAHYDRATE 40 MG/ML
4 INJECTION, SOLUTION INTRAVENOUS ONCE
Status: COMPLETED | OUTPATIENT
Start: 2018-12-11 | End: 2018-12-11

## 2018-12-11 RX ORDER — HEPARIN SODIUM (PORCINE) LOCK FLUSH IV SOLN 100 UNIT/ML 100 UNIT/ML
500 SOLUTION INTRAVENOUS DAILY PRN
Status: DISCONTINUED | OUTPATIENT
Start: 2018-12-11 | End: 2018-12-11 | Stop reason: HOSPADM

## 2018-12-11 RX ADMIN — SODIUM CHLORIDE 1000 ML: 9 INJECTION, SOLUTION INTRAVENOUS at 08:48

## 2018-12-11 RX ADMIN — MAGNESIUM SULFATE IN WATER 4 G: 40 INJECTION, SOLUTION INTRAVENOUS at 08:48

## 2018-12-11 RX ADMIN — SODIUM CHLORIDE, PRESERVATIVE FREE 500 UNITS: 5 INJECTION INTRAVENOUS at 10:50

## 2018-12-11 NOTE — PROGRESS NOTES
Infusion Nursing Note:  Odalys Nathan presents today for labs and IVF/Mg.    Patient seen by provider today: No   present during visit today: Not Applicable.    Note: NA    Intravenous Access:  Labs drawn without difficulty.  Implanted Port.    Treatment Conditions:  Mg 1.1--replaced with 4gm IV per protocol.      Post Infusion Assessment:  Patient tolerated infusion without incident.  Blood return noted pre and post infusion.  Site patent and intact, free from redness, edema or discomfort.  No evidence of extravasations.  Access discontinued per protocol.    Discharge Plan:   Discharge instructions reviewed with: Patient.  Patient and/or family verbalized understanding of discharge instructions and all questions answered.  AVS to patient via [employer].  Patient will return 12/13/18 for labs/C3D36 Taxol for next appointment.   Patient discharged in stable condition accompanied by: self.  Departure Mode: Ambulatory.    Loree Ellis RN

## 2018-12-13 ENCOUNTER — HOSPITAL ENCOUNTER (OUTPATIENT)
Facility: CLINIC | Age: 58
Setting detail: SPECIMEN
Discharge: HOME | End: 2018-12-13
Attending: NURSE PRACTITIONER | Admitting: NURSE PRACTITIONER
Payer: COMMERCIAL

## 2018-12-13 ENCOUNTER — INFUSION THERAPY VISIT (OUTPATIENT)
Dept: INFUSION THERAPY | Facility: CLINIC | Age: 58
End: 2018-12-13
Attending: NURSE PRACTITIONER
Payer: COMMERCIAL

## 2018-12-13 VITALS
SYSTOLIC BLOOD PRESSURE: 116 MMHG | HEART RATE: 80 BPM | DIASTOLIC BLOOD PRESSURE: 59 MMHG | BODY MASS INDEX: 24.35 KG/M2 | TEMPERATURE: 98.2 F | OXYGEN SATURATION: 100 % | WEIGHT: 146.3 LBS | RESPIRATION RATE: 16 BRPM

## 2018-12-13 DIAGNOSIS — D70.2 DRUG-INDUCED NEUTROPENIA (H): ICD-10-CM

## 2018-12-13 DIAGNOSIS — C56.2 OVARIAN CANCER, LEFT (H): ICD-10-CM

## 2018-12-13 DIAGNOSIS — M53.3 COCCYDYNIA: ICD-10-CM

## 2018-12-13 DIAGNOSIS — C56.1 OVARIAN CANCER, RIGHT (H): ICD-10-CM

## 2018-12-13 DIAGNOSIS — Z79.899 ENCOUNTER FOR LONG-TERM (CURRENT) USE OF MEDICATIONS: Primary | ICD-10-CM

## 2018-12-13 LAB
BASOPHILS # BLD AUTO: 0 10E9/L (ref 0–0.2)
BASOPHILS NFR BLD AUTO: 0.6 %
DIFFERENTIAL METHOD BLD: ABNORMAL
EOSINOPHIL # BLD AUTO: 0.2 10E9/L (ref 0–0.7)
EOSINOPHIL NFR BLD AUTO: 2.4 %
ERYTHROCYTE [DISTWIDTH] IN BLOOD BY AUTOMATED COUNT: 15.1 % (ref 10–15)
HCT VFR BLD AUTO: 33.7 % (ref 35–47)
HGB BLD-MCNC: 11 G/DL (ref 11.7–15.7)
IMM GRANULOCYTES # BLD: 0.1 10E9/L (ref 0–0.4)
IMM GRANULOCYTES NFR BLD: 1.2 %
LYMPHOCYTES # BLD AUTO: 2.3 10E9/L (ref 0.8–5.3)
LYMPHOCYTES NFR BLD AUTO: 34.7 %
MAGNESIUM SERPL-MCNC: 1 MG/DL (ref 1.6–2.3)
MCH RBC QN AUTO: 35.3 PG (ref 26.5–33)
MCHC RBC AUTO-ENTMCNC: 32.6 G/DL (ref 31.5–36.5)
MCV RBC AUTO: 108 FL (ref 78–100)
MONOCYTES # BLD AUTO: 0.5 10E9/L (ref 0–1.3)
MONOCYTES NFR BLD AUTO: 7 %
NEUTROPHILS # BLD AUTO: 3.6 10E9/L (ref 1.6–8.3)
NEUTROPHILS NFR BLD AUTO: 54.1 %
NRBC # BLD AUTO: 0 10*3/UL
NRBC BLD AUTO-RTO: 0 /100
PLATELET # BLD AUTO: 292 10E9/L (ref 150–450)
POTASSIUM SERPL-SCNC: 3.9 MMOL/L (ref 3.4–5.3)
RBC # BLD AUTO: 3.12 10E12/L (ref 3.8–5.2)
WBC # BLD AUTO: 6.7 10E9/L (ref 4–11)

## 2018-12-13 PROCEDURE — 25000132 ZZH RX MED GY IP 250 OP 250 PS 637: Performed by: NURSE PRACTITIONER

## 2018-12-13 PROCEDURE — 96413 CHEMO IV INFUSION 1 HR: CPT

## 2018-12-13 PROCEDURE — 84132 ASSAY OF SERUM POTASSIUM: CPT | Performed by: NURSE PRACTITIONER

## 2018-12-13 PROCEDURE — 96368 THER/DIAG CONCURRENT INF: CPT

## 2018-12-13 PROCEDURE — 83735 ASSAY OF MAGNESIUM: CPT | Performed by: NURSE PRACTITIONER

## 2018-12-13 PROCEDURE — 25000128 H RX IP 250 OP 636: Performed by: NURSE PRACTITIONER

## 2018-12-13 PROCEDURE — 25000125 ZZHC RX 250: Performed by: NURSE PRACTITIONER

## 2018-12-13 PROCEDURE — 96375 TX/PRO/DX INJ NEW DRUG ADDON: CPT

## 2018-12-13 PROCEDURE — 85025 COMPLETE CBC W/AUTO DIFF WBC: CPT | Performed by: NURSE PRACTITIONER

## 2018-12-13 RX ORDER — MAGNESIUM SULFATE HEPTAHYDRATE 40 MG/ML
4 INJECTION, SOLUTION INTRAVENOUS ONCE
Status: COMPLETED | OUTPATIENT
Start: 2018-12-13 | End: 2018-12-13

## 2018-12-13 RX ORDER — HEPARIN SODIUM (PORCINE) LOCK FLUSH IV SOLN 100 UNIT/ML 100 UNIT/ML
500 SOLUTION INTRAVENOUS EVERY 8 HOURS
Status: DISCONTINUED | OUTPATIENT
Start: 2018-12-13 | End: 2018-12-13 | Stop reason: HOSPADM

## 2018-12-13 RX ORDER — HYDROCODONE BITARTRATE AND ACETAMINOPHEN 5; 325 MG/1; MG/1
1 TABLET ORAL EVERY 4 HOURS PRN
Qty: 30 TABLET | Refills: 0 | Status: SHIPPED | OUTPATIENT
Start: 2018-12-13 | End: 2018-12-20

## 2018-12-13 RX ORDER — DIPHENHYDRAMINE HCL 25 MG
25 CAPSULE ORAL ONCE
Status: COMPLETED | OUTPATIENT
Start: 2018-12-13 | End: 2018-12-13

## 2018-12-13 RX ADMIN — SODIUM CHLORIDE 250 ML: 9 INJECTION, SOLUTION INTRAVENOUS at 09:00

## 2018-12-13 RX ADMIN — MAGNESIUM SULFATE IN WATER 4 G: 40 INJECTION, SOLUTION INTRAVENOUS at 09:30

## 2018-12-13 RX ADMIN — PACLITAXEL 138 MG: 6 INJECTION, SOLUTION INTRAVENOUS at 09:50

## 2018-12-13 RX ADMIN — DEXAMETHASONE SODIUM PHOSPHATE 12 MG: 10 INJECTION, SOLUTION INTRAMUSCULAR; INTRAVENOUS at 09:04

## 2018-12-13 RX ADMIN — DIPHENHYDRAMINE HYDROCHLORIDE 25 MG: 25 CAPSULE ORAL at 09:01

## 2018-12-13 RX ADMIN — FAMOTIDINE 40 MG: 10 INJECTION, SOLUTION INTRAVENOUS at 09:01

## 2018-12-13 RX ADMIN — SODIUM CHLORIDE, PRESERVATIVE FREE 500 UNITS: 5 INJECTION INTRAVENOUS at 11:29

## 2018-12-13 ASSESSMENT — PAIN SCALES - GENERAL: PAINLEVEL: MODERATE PAIN (4)

## 2018-12-13 NOTE — PROGRESS NOTES
Infusion Nursing Note:  Odalys Nathan presents today for C3D36 Taxol.    Patient seen by provider today: No   present during visit today: Not Applicable.    Note: Patient c/o continued pain in coccyx. She has been taking Advil PRN with some relief, but requests Rx for something stronger from Patricia [facility]. Patricia did see patient and provide script.  She is also interested in pursuing a clinical trial for a measles vaccine through [facility]. Patricia recommended that patient contact research staff at [facility] to inquire.    Intravenous Access:  Labs drawn without difficulty.  Implanted Port.    Treatment Conditions:  Lab Results   Component Value Date    HGB 11.0 12/13/2018     Lab Results   Component Value Date    WBC 6.7 12/13/2018      Lab Results   Component Value Date    ANEU 3.6 12/13/2018     Lab Results   Component Value Date     12/13/2018      Results reviewed, labs MET treatment parameters, ok to proceed with treatment.    Post Infusion Assessment:  Patient tolerated infusion without incident.  Blood return noted pre and post infusion.  Site patent and intact, free from redness, edema or discomfort.  No evidence of extravasations.  Access discontinued per protocol.    Discharge Plan:   Discharge instructions reviewed with: Patient.  Patient and/or family verbalized understanding of discharge instructions and all questions answered.  AVS to patient via [employer].  Patient will return 12/17 for CT, 12/20 for f/u with MD.   Patient discharged in stable condition accompanied by: daughter.  Departure Mode: Ambulatory.    Elly Mcfadden RN

## 2018-12-13 NOTE — PROGRESS NOTES
Odalys reports falling on her coccyx two weeks ago while walking her dog and has had significant soreness since then. She has been taking ibuprofen twice daily with only mild improvement. Denies weakness, numbness or tingling in her legs, or bowel/bladder concerns after this. Recommend that she try hot/cold packs at least four times daily, using a doughnut pillow, being up and active. Discouraged use of ibuprofen given that she is on active treatment and concern for bleeding with this- will switch to Norco 5mg-325mg q4h PRN. Reviewed opioid precautions/safety and not to take with lorazepam. Has imaging scheduled on 12/17 already- I do not feel it is necessary to obtain imaging prior to this given her lack of other concerning symptoms. We reviewed red flags with coccydynia and when to seek emergency care. Also reviewed her questions about clinical trials at [facility]- encouraged her to look into these further and to discuss with Dr. Yeung at her appt on 12/20.  HAILE De Paz, FNP-C  Gynecologic Oncology  [facility]  Pager: [phone]

## 2018-12-17 ENCOUNTER — HOSPITAL ENCOUNTER (OUTPATIENT)
Dept: CT IMAGING | Facility: CLINIC | Age: 58
Discharge: HOME | End: 2018-12-17
Attending: NURSE PRACTITIONER | Admitting: NURSE PRACTITIONER
Payer: COMMERCIAL

## 2018-12-17 DIAGNOSIS — C56.9 OVARIAN CANCER, UNSPECIFIED LATERALITY (H): ICD-10-CM

## 2018-12-17 LAB
CREAT BLD-MCNC: 0.8 MG/DL (ref 0.52–1.04)
GFR SERPL CREATININE-BSD FRML MDRD: 73 ML/MIN/1.7M2

## 2018-12-17 PROCEDURE — 82565 ASSAY OF CREATININE: CPT

## 2018-12-17 PROCEDURE — 25000128 H RX IP 250 OP 636: Performed by: RADIOLOGY

## 2018-12-17 PROCEDURE — 74177 CT ABD & PELVIS W/CONTRAST: CPT

## 2018-12-17 RX ORDER — IOPAMIDOL 755 MG/ML
500 INJECTION, SOLUTION INTRAVASCULAR ONCE
Status: COMPLETED | OUTPATIENT
Start: 2018-12-17 | End: 2018-12-17

## 2018-12-17 RX ADMIN — IOPAMIDOL 71 ML: 755 INJECTION, SOLUTION INTRAVENOUS at 08:43

## 2018-12-17 RX ADMIN — SODIUM CHLORIDE 58 ML: 9 INJECTION, SOLUTION INTRAVENOUS at 08:43

## 2018-12-18 ASSESSMENT — ENCOUNTER SYMPTOMS
TACHYCARDIA: 0
BOWEL INCONTINENCE: 0
POSTURAL DYSPNEA: 0
EYE WATERING: 0
PALPITATIONS: 0
DYSURIA: 0
EYE PAIN: 0
MUSCLE WEAKNESS: 0
NAIL CHANGES: 0
RECTAL BLEEDING: 0
NIGHT SWEATS: 0
HEMOPTYSIS: 0
LEG SWELLING: 0
CHILLS: 0
SPUTUM PRODUCTION: 0
HEADACHES: 0
PARALYSIS: 0
BRUISES/BLEEDS EASILY: 0
ALTERED TEMPERATURE REGULATION: 0
FATIGUE: 1
BLOATING: 0
BACK PAIN: 0
NAUSEA: 0
WHEEZING: 0
SHORTNESS OF BREATH: 0
BREAST PAIN: 0
WEAKNESS: 0
COUGH DISTURBING SLEEP: 0
WEIGHT LOSS: 0
EYE REDNESS: 0
DYSPNEA ON EXERTION: 0
SNORES LOUDLY: 0
POLYPHAGIA: 0
RESPIRATORY PAIN: 0
SINUS CONGESTION: 0
LIGHT-HEADEDNESS: 0
PANIC: 0
HEMATURIA: 0
DEPRESSION: 0
MUSCLE CRAMPS: 0
TREMORS: 0
SWOLLEN GLANDS: 0
VOMITING: 0
SINUS PAIN: 0
EXERCISE INTOLERANCE: 0
HEARTBURN: 0
EYE IRRITATION: 0
BLOOD IN STOOL: 0
WEIGHT GAIN: 0
HYPOTENSION: 0
HALLUCINATIONS: 0
NERVOUS/ANXIOUS: 0
INSOMNIA: 0
DIFFICULTY URINATING: 0
MYALGIAS: 0
SYNCOPE: 0
DIARRHEA: 1
LEG PAIN: 0
SLEEP DISTURBANCES DUE TO BREATHING: 0
EXTREMITY NUMBNESS: 0
POLYDIPSIA: 0
FEVER: 0
DISTURBANCES IN COORDINATION: 0
POOR WOUND HEALING: 0
SKIN CHANGES: 0
SPEECH CHANGE: 0
ORTHOPNEA: 0
TINGLING: 1
NECK PAIN: 0
DECREASED APPETITE: 0
INCREASED ENERGY: 0
ABDOMINAL PAIN: 0
FLANK PAIN: 0
STIFFNESS: 0
SMELL DISTURBANCE: 0
TASTE DISTURBANCE: 0
JOINT SWELLING: 0
SORE THROAT: 0
SEIZURES: 0
CLAUDICATION: 0
COUGH: 0
JAUNDICE: 0
LOSS OF CONSCIOUSNESS: 0
ARTHRALGIAS: 0
NUMBNESS: 0
MEMORY LOSS: 0
TROUBLE SWALLOWING: 0
DIZZINESS: 0
DECREASED LIBIDO: 0
HYPERTENSION: 0
BREAST MASS: 0
DOUBLE VISION: 0
DECREASED CONCENTRATION: 0
HOT FLASHES: 0
CONSTIPATION: 0
RECTAL PAIN: 0
NECK MASS: 0
HOARSE VOICE: 0

## 2018-12-19 NOTE — PROGRESS NOTES
Gynecologic Oncology Follow-Up Note    RE: Odalys Nathan  MRN: [id number]  : 1960  Date of Visit: 2018    CC: Odalys Nathan is a 58 year old year old female with recurrent stage IIIC bilateral ovarian cancer who presents today for disease management with weekly paclitaxel, she is s/p 3 cycles.     HPI: Odalys presents to clinic today feeling well.  is decreasing and imaging was discussed at length. She continues to have diarrhea. She recently fell a few weeks ago and injured her tailbone. She is otherwise doing well. No bowel issues or urinary issues. No fever or chills. No nausea or vomiting. No chest pain or SOB. No lower extremity pain or edema. No abdominal pain, pelvic pain or back pain.     Brief Oncology History:  2012 - Admitted to hospital for 2 weeks of intermittent abdominal cramping, distention, diarrhea and N/V. CT of abdomen/pelvis significant for small bowel obstruction, a heterogenous soft tissue density in the pelvis, omental nodules, and ascites. Bilateral adnexal masses per U/S of pelvis. CA-125 was elevated at 987, CEA was normal at 1.0.    12 - Therapeutic paracentesis (4 L) with cytology confirming malignancy (NJ positive, weak ER positive, CK-7 positive consistent with GYN primary). Surgery recommended d/t potential for falling blood counts 2/2 chemotherapy (patient is [employer] and limiting blood transfusion)    12 - Exploratory laparotomy, MAR BSO, lysis of adhesion, Appendectomy, Repair cystotomy, Omentectomy Hyym-isqflb-gunwlorua-transverse-colon resection, Ileo-descending colon anastomosis, CUSA, & Colonoscopy (done by Dr. Amato and colorectal team).  2012 - Admitted to hospital for bilateral pulmonary emboli and drainage of pleural effusion. Started on Lovenox.  12-3/21/12: Cycle #1-2 Carbo/Taxol IV  3/29/12 - Started on Keflex by her PCP for infection in her healing wound (immediately below her umbilicus).     4/11/12-6/14/12: Cycle #3-6 IV chemo, Cycle #1 IV/IP chemo  7/16/12 -  8, CT PRADEEP - enrolled in  - observation arm  3/4/13-6/28/13:  6, 9, 12, 15, 20.  7/1/13: CT Chest, Abdomen, Pelvis IMPRESSION:  1. Worsening metastatic ovarian carcinoma suggested by increased size of soft tissue nodules anterior to the right psoas muscle, that may represent growing mesenteric lymphadenopathy.  2. Remaining prominent right lower quadrant mesenteric lymph nodes are not significantly changed from CT 7/16/2012.  3. Clustered nodular opacities in the right lower lobe are not significant change from 7/16/2012 and remain indeterminate. Again, the appearance and distribution is suggestive of an infectious etiology.  4. Stable 8 mm soft tissue nodule in left breast, unchanged since at least 02/20/2012. This can be observed on followup studies, but correlation with mammography could be considered.  Decision made to start Doxil/Carbo.  7/11/13: The left ventricular ejection fraction is normal at 66.4%.  7/12/13-9/6/13: Cycle #1-3 Doxil/Carbo.  10, 11.    9/30/13 CT C/A/P Impression:  1. Overall, favorable response to treatment with decreasing size of soft tissue nodules tracking along the anterior aspect of the right psoas muscle.  2. 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  3. Improved cluster of right lower lobe pulmonary nodular opacities. These may represent resolving infection.  10/3/13-12/9/13:  9, 8, 10. Cycle 4-6 Doxil/Carbo.    1/13/14 CT C/A/P Impression:   1. Stable appearance of metastatic ovarian cancer. Scattered soft tissue nodules along the anterior aspect of the right psoas muscle are unchanged in size. Mild mesenteric lymphadenopathy is unchanged.  2. Clustered micronodules in the right lower lobe are unchanged from 9/30/2013, but improved from 7/1/2013. This history suggests a postinflammatory/postinfectious etiology.  3. 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  1/16/14  Discussed multiple options for her based on relatively stable-appearing disease on CT but slight increase in  (which has had small increase to 20 with last recurrence) including chemo break with recheck of  in 1 month, starting new chemo agent immediately, and exploratory surgery with possible resection of nodules. She is considered platinum-sensitive based on > 1 year remission after Taxol/Carbo, which we will take into consideration for future chemo planning. I am not inclined to surgery at this time given difficulty she already has with diarrhea secondary to past colon resection. I suspect we would need to resect further bowel due to mesenteric disease. Also explained inherent risks of any major surgery. Also mentioned maintenance chemo, but this has not been shown to increase overall survival and would likely decrease her quality of life without significant benefit. Family is going on vacation to [place] in 2 weeks and Odalys does not want to have chemo prior to that, so will plan to take 1 month break. She can have  at that time (discussed checking toady since last draw was in early December, but as it would likely not change treatment plan and she has h/o slow rising , will not check today).  2/17/14  16    2/20/14: Decision to take break from chemo for two months, followed by CT and CA-125.    4/21/14  27  4/21/14 CT C/A/P Impression:    1. Increased size of 2 low-attenuation lymph nodes anterior to the right psoas muscle is concerning for worsening metastatic ovarian cancer.    2. New circumferential thickening of a 3.8 cm length segment of distal transverse colon is likely physiologic. Recommend attention on followup imaging.    3. Grossly unchanged size of clustered small nodules versus scarring in the right lower lobe the lungs.    4. Stable thrombosis of the right ovarian vein.  5/14/14: Diagnostic laparoscopy converted to exploratory laparotomy and removal of  "mesenteric masses, tumor debulking, peritoneal biopsies and intraperitoneal port placement. On laparoscopy, it was noted that there were small nodules on the anterior abdominal wall near the previous incision, small nodules on the right pelvic sidewall as well. Nodules were palpated in the mesentery; however, as it was unable to clarify where the origin of the nodules was, the decision was made to open the patient. On opening there was found to be approximately a 3 cm nodule in the small bowel mesentery and another separate approximately 2 cm nodule in the bowel mesentery. Pelvis without evidence of cancer, some mesenteric lymph nodes were palpated. No evidence otherwise of any disseminated cancer throughout the abdomen.    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 (2/2)  -Largest metastasis: 1.3 cm  -See comment  D: Peritoneum, right paracolic gutter #2, biopsy:  -Fibroadipose tissue with granulomatous inflammation surrounding refractile material  -Negative for malignancy   E: Small bowel adhesion, biopsy:  -Fibroconnective tissue, consistent with adhesion  -Negative for malignancy  F: Lymph nodes, mesentry, not otherwise specified, excision:  -Two lymph nodes, negative for metastatic carcinoma (0/2)  G: Lymph node, mesentery, \"#2\", excision:  -One lymph node, negative for metastatic carcinoma (0/1)  H: Lymph nodes, mesentery, \"nodule #2\", excision:  -Five lymph nodes, negative for metastatic carcinoma (0/5)  COMMENT:  Some of the specimens show post-operative changes. Others show possible treatment related changes, including necrosis. The metastatic carcinoma in the " mesenteric lymph nodes (specimen C) shows variable morphology, including relatively low grade tumor with papillary architecture, and high grade tumor comprised of nests of tumor cells with irregular, slit-like spaces and marked nuclear pleomorphism.    5/29/14: Cycle 1 IV PACLitaxel / IP CISplatin / IP PACLitaxel.  - 28.  6/26/14: Cycle #2 IV/IP.  10  8/5/14: CT chest/abd/pelvis IMPRESSION     1. In this patient with ovarian cancer, overall findings are indicative of stable/slight improvement, as multiple mesenteric lymphadenopathy and scattered nodular peritoneal soft tissue mass lesions appear unchanged or slightly smaller since 4/21/2014.    2. 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 Mild dilatation of the second and the third portion of the duodenum with a narrow SMA angle. This could represent SMA syndrome, if clinically correlated.17/14:    Cycle #3 IV/IP.  10.    CT chest/abd/pelvis with contrast on 8/5/14    Impression:    1. In this patient with ovarian cancer, overall findings are indicative of stable/slight improvement, as multiple mesenteric lymphadenopathy and scattered nodular peritoneal soft tissue mass lesions appear unchanged or slightly smaller since 4/21/2014.    2. 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 Mild dilatation of the second and the third portion of the duodenum with a narrow SMA angle. This could represent SMA syndrome, if clinically correlated.  8/7/14: Cycle #4 Taxol/Carbo (changed from IV/IP).  10. She has been feeling okay. She is unsure if she can finish out the course of 6 cycles IV/IP taxol/cisplatin. She feels like she has the flu for about a week then starts feeling gradually better after each chemo cycle. Her spouse notes that she actually has been more sick with the treatments than she initially admits here. She was also previously having some rib pain. Denies any rib pain now. Denies any chest pain or shortness  of breath.  Plan: discussed recent CT cap results and switching to just IV as she is feeling miserable with IP treatments. Switch to IV carbo/taxol as patient is platinum sensitive.  We also discussed her taking part of the tesaro trial, which would require BRCA testing. She would like to take part in this trial if eligible.  8/20/14: Remove Intraperitoneal Port ( Port and catheter intact - discarded)  8/28/14: Cycle #5 Taxol/Carbo held due to thrombocytopenia.  6.    She denies any vaginal bleeding, no changes in her bowel or bladder habits, no nausea/emesis, no lower extremity edema, and no difficulties eating or sleeping. She denies any abdominal discomfort/bloating, no fevers or chills, and no chest pain or shortness of breath. She states her diarrhea is the same. She reports some fatigue which improves about 1-2 weeks after her chemotherapy. She states she does not need any medication refills and she was told she does not meet the criteria for the TESARO trial. She states she has 3 bags of iv fluids left over from her previous chemotherapy and will give these to herself. She states she is ready for her treatment today.    9/29/14: Cycle #6 Taxol/Carbo  6. Insurance questions regarding GSF coverage today. No concerns other than fatigue. Taking iron for anemia and does not desire blood transfusion. Using neulasta for neutropenia. Using home IV hydration if needed. Baseline unchanged. No abdominal bloating, constipation, diarrhea, pain, vaginal or rectal bleeding, cough or dyspnea, fluid retention.    10/16/14: Impression:    1. Nodular peritoneal soft tissue mass in the right lower quadrant adjacent to the psoas muscle is no longer appreciated. Adjacent prominent lymphadenopathy is unchanged from previous exam. No new peritoneal lesions.    2. 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    10/20/14:  5. CT chest/abdomen/pelvis on 10/16/14 showed nodular peritoneal soft tissue mass in  the right lower quadrant adjacent to the psoas muscle is no longer appreciated. Adjacent prominent lymphadenopathy is unchanged from previous exam. 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  1/27/15:  6.  4/28/15:  14.  5/26/15:  18.  6/2/15: CT cap Impression:  1. Postsurgical changes of hysterectomy and bilateral salpingo-oophorectomy for ovarian cancer. There is a new 8 mm hazy, ill-defined hypoattenuating lesion in hepatic segment 6 which is suspicious for a metastatic deposit. Further evaluation with ultrasound in recommended.    2. Increased size of a left retroperitoneal lymph node which is indeterminate but may represent a ciro metastasis. Mildly prominent lymph nodes in the right lower quadrant are not significantly changed.  3. Moderate colonic stool burden.    6/4/15: US abdomen IMPRESSION:    Hyperechoic lesion in the right hepatic lobe, consistent with hemangioma. This does not corresponds to the area of the lesion seen on CT from 6/2/2015. An MR would be helpful for identifying and characterizing the lesion from the recent CT.  6/12/15: MR abd IMPRESSION:  1. New 20 x 11 mm enhancing lesions between the right obliques, concerning for metastatic disease. This lesions should be amenable to percutaneous biopsy, if indicated.  2. Correlating to the lesion visualized on comparison CT is a hepatic segment 6 subcapsular 7 mm lesion. Overall the appearance favors the diagnosis of a simple cyst. However, there is faint suggestion of mild peripheral arterial enhancement. Although this is favored as  artifactual, this should be followed up to confirm stability. Recommend 6 month followup.  3. Hepatic segment 6, 5 mm lesion too small to technically characterize. Differential would favor FNH, less likely flash filling hemangioma. Recommend attention on followup.  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     9/1/15: Cycle #1 Avastin/Cytoxan.   31.     9/24/15:feeling generally well. She says she has been having back and stomach spasms. She is taking cytosine daily (she ran out yesterday). She also says its affecting her voice. Admits that it burns occasionally. She is eating and drinking normal. She also admit diarrhea, 5-7 times daily, lose/watery. She trying to stay hydrated and eat fiber. She also says that her body is sore, especially the bottom of her feet. Her blood pressure is normal. She also admits having headache after her first infusion.      9/24/15: Cycle #2 Avastin/Cytoxan.  19.  10/15/15: Cycle #3 Avastin/Cytoxan.  16.     11/6/15: CT c/a/p IMPRESSION:    1. Stable postoperative change of MAR/BSO for ovarian cancer.  2. The lesion in the right flank abdominal musculature is slightly decreased in size. Otherwise, stable examination.  2. No evidence of metastatic disease in the chest.    11/20/15: Treatment planning visit,  16    11/25/15: surgical pathology report  FINAL DIAGNOSIS:  Soft tissue, right oblique muscle mass, excision:  -Recurrent ovarian serous carcinoma  -Carcinoma is present less than 1 mm from one resection margin  -Background skeletal muscle and fibroadipose tissue    1/4/16:  22  1/11/16-1/27/16: Radiation to right flank x 12 treatments  4/7/2016:  94. CT cap IMPRESSION:    In this patient with ovarian cancer status post MAR/BSO and descending/transverse colectomy:  1. No evidence for malignancy in the chest, abdomen, or pelvis.  2. Stable small hypodense segment 6 liver lesion, appears more likely benign, possibly a cyst.  5/13/16:  124.  6/3/16: PET CT IMPRESSION: In this patient with a history of ovarian cancer:  1. Hypermetabolic and enlarging periaortic and perihepatic lymphadenopathy compatible with metastatic disease, as detailed above.  2.  Although hypodense lesion in hepatic segment 6 has been present since 6/2/2015 associated hypermetabolism makes this lesion highly concerning for metastatic disease.    Plan: to start Niraparib under TESARO study.     6/9/16:  137.  6/14/16: Cycle #1 Niraparib.    6/28/16: Cycle #1 D15 Niraparib.    7/5/16:  100.    7/11/16: Cycle #2 Nraparib.   83.    8/3/2016: PET CT IMPRESSION:    In this patient with known history of ovarian cancer:  1) New pleural based nodular opacities in the lateral and inferior aspects of the bilateral lower lobes, worse in the left lung. Likely infection. Close follow up is recommended.      2) Slight decrease in hypermetabolic abdominal lymphadenopathy. 2 hypermetabolic lymph nodes persist.  3) Unchanged right hepatic lobe metastatic lesion.     8/9/16: Cycle #3 Niraparib.  69.  9/6/16: Cycle #4 Niraparib.  53. Dose held due to anemia.  9/13/16: Eval for potential cycle 4 niraparib. Dose held due to anemia.  10/4/16: CT CAP impression:  IMPRESSION: In this patient with a known history of ovarian cancer:  1. There has been interval resolution of pleural-based nodular  opacities which likely represented infection.  2. Abdominal lymphadenopathy in the form of 2 portacaval lymph nodes  have not significantly changed in size, noted to be hypermetabolic on  prior PET/CT.  3. Previously demonstrated metastatic lesion in the right lobe of the  liver is not significantly changed.  10/11/16:  60  11/1/16: C6 niraparib,  75  11/29/16: C7 niraparib.  78. CT CAP impression as follows:  Target lesions (RECIST criteria):       A previously described target lesion superior to the head of the  pancreas (series 2, image 64)  (referred to as a perihepatic node on  6/3/2016) may not be a valid target lesion because it measured less  than 1.5 cm originally. However, this particular node has decreased in  size, now measuring 7 mm in short axis versus 14 mm on  6/3/2016 when  measured in a similar fashion.       2.3 cm short axis portacaval lymph node on series 2 image 67,  previously 2.0 cm on 10/4/2016.       1.2 cm subtle hypodensity in hepatic segment 6 on series 2 image 75,  stable on multiple studies since at least 6/3/2016     Sum of diameters today: 3.5 cm. Sum of diameters 10/4/2016: 3.2 cm.  Growth = 9%.    12/27/16: C8 niraparib.  105.  1/25/17: C9 niraparib.  108.  2/23/17: C10 niraparib.  132.   CT CAP impression:  Sum of target lesion diameters today: 3.7 cm. Sum of target lesion  diameters on 11/28/2016: 3.5 cm. Growth= 6%  1. In this patient with history of ovarian cancer there is stable  disease by RECIST criteria as evidenced by:   1a. Mildly increased size of liver metastasis.  1b. Stable portacaval lymphadenopathy.  1c. No evidence of metastatic disease in the chest.  2. Trace emphysematous changes of the lungs.  3/22/17: C11 niraparib.  132.  4/19/17: C12 niraparib.  127.  5/16/17: CT CAP IMPRESSION: In this patient with ovarian cancer:  1. Mildly increased size of hepatic metastasis segment 6 with subtle increased capsular retraction.  2. Stable edmundo hepatis nodes, with mild increase in size of periaortic lymph nodes.  3. Prominent left supraclavicular lymph node with subtle increase in size compared to prior studies, particularly comparing to 10/4/2016.  4. Mild subtle groundglass opacities in the right upper lobe, not present on prior study, lesser extent in the right lower lobe.  Findings may represent infection, additional consideration is malignancy (less likely), and attention on follow-up study  Recommended.  Addendum:   Prominent left supraclavicular lymph node (3/25) is stable from most recent CT performed 2/20/2017, currently measuring 14 x 16 mm, previously 14 x 16 mm on 2/20/2017.      The portal caval lymph node/edmundo hepatis lymph node is stable from 2/20/2017, measuring 21 mm.      Clarification of size of  the para-aortic lymph node (series 3 image 327). It short axis measurement is 11 mm versus 9 mm on prior study.      Hepatic segment 6 triangular-shaped low density lesion (3/362) measures 14 mm, previously measured 12 mm, demonstrating a  possible/questionable minimal subtle increase in size. Similarly the lymph nodes noted above in the supraclavicular and para-aortic regions demonstrate possible minimal possible subtle increase in size.      5/18/17: Cycle #13 Niraparib.  191.  6/15/17: Cycle #14 Niraparib.  146.  7/13/17: Cycle #15 Niraparib 200 mg.  171     CT (8/9/17):       IMPRESSION:  1. Segment 6 hepatic metastasis is stable to minimally increased in  size.  2. Slight increase in multicentric adenopathy, most pronounced at the  edmundo hepatis. Additional sites include the inferior left  neck/supraclavicular region and retroperitoneum which appear stable to  minimally increased.      8/10/17:  175  9/14/17: MUGA LVEF 54%  9/22/17: C1D1 carboplatin/Doxil.  187.  10/19/17: C2D1 carboplatin/Doxil.  108.  11/17/17: C3D1 carboplatin/Doxil.  82.  12/14/17: C4D1 carboplatin/Doxil.  83.  1/12/18: C5D1 carboplatin/Doxil.  80.  2/9/18: C6D1 carboplatin/Doxil.  71.  3/2/18:  49. Three month treatment break.  6/20/2018:  170. CT CAP:  IMPRESSION: In this patient with history of ovarian cancer:  1. Increased size of a right hepatic lobe lesion and numerous edmundo  hepatis and retroperitoneal lymph nodes compatible with progression of  metastatic disease.  2. New mild intrahepatic biliary ductal dilatation, periportal edema,  and pericholecystic fluid. Increased soft tissue fullness in the edmundo  hepatis in the expected location of the common hepatic duct. Findings  are suspicious for developing biliary ductal obstruction secondary to  worsening metastatic disease in the edmundo hepatis. Correlation with  liver function tests is recommended. Right upper  quadrant ultrasound  may be beneficial.  3. Unchanged left supraclavicular and right hilar lymphadenopathy in  the chest.     8/3/18: C1D1 weekly paclitaxel.  not done.  9/20/18: C2D1 weekly paclitaxel 80mg/m2. Ca 125-56  11/8/2018: C3D1 weekly paclitaxel;  26    12/17/18: Ct cap  IMPRESSION:  1. New area of lobulated hypodense nodularity at the far-inferior  right liver. This raises the possibility of a new area of hepatic  metastasis.  2. Conversely, other nodules within the right liver are smaller  suggesting improvement.  3. Improved adenopathy at the abdominal retroperitoneum. Improved left  supraclavicular adenopathy. Stable mildly prominent right hilar lymph  nodes.  4. Previously noted ill-defined soft tissue at the edmundo hepatis  region is still present but appears less prominent in size.  5. New finding of segmental wall thickening of the proximal sigmoid  colon with some fluid distention of the adjacent colon. This could  represent a segmental colitis. Other etiologies not excluded.       Date Value Ref Range Status   12/20/2018 19 0 - 30 U/mL Final     Comment:     Assay Method:  Chemiluminescence using Siemens Centaur XP   11/08/2018 26 0 - 30 U/mL Final     Comment:     Assay Method:  Chemiluminescence using Siemens Centaur XP   09/20/2018 56 (H) 0 - 30 U/mL Final     Comment:     Assay Method:  Chemiluminescence using Siemens Centaur XP   06/20/2018 170 (H) 0 - 30 U/mL Final     Comment:     Assay Method:  Chemiluminescence using Siemens Centaur XP   03/02/2018 49 (H) 0 - 30 U/mL Final     Comment:     Assay Method:  Chemiluminescence using Siemens Centaur XP   02/09/2018 71 (H) 0 - 30 U/mL Final     Comment:     Assay Method:  Chemiluminescence using Siemens Centaur XP   01/12/2018 80 (H) 0 - 30 U/mL Final     Comment:     Assay Method:  Chemiluminescence using Siemens Centaur XP   12/12/2017 83 (H) 0 - 30 U/mL Final     Comment:     Assay Method:  Chemiluminescence using Siemens  Centaur XP   11/17/2017 82 (H) 0 - 30 U/mL Final     Comment:     Assay Method:  Chemiluminescence using Siemens Centaur XP   10/20/2017 108 (H) 0 - 30 U/mL Final     Comment:     Assay Method:  Chemiluminescence using Siemens Centaur XP       Past Medical History:   Diagnosis Date     Antiplatelet or antithrombotic long-term use      Ascites      Blood clot in the legs      Diabetes (H)      Ovarian cancer (H)     serous,stg IV     Pleural effusion      Pulmonary embolism (H) 2/2012     Refusal of blood transfusions as patient is Christian      Short gut syndrome      Subclinical hypothyroidism 4/18/2013     Thrombosis of leg        Past Surgical History:   Procedure Laterality Date     COLECTOMY       COLONOSCOPY  2/1/2012    Procedure:COLONOSCOPY; With Biopsy; Surgeon:TONI SULLIVAN; Location:[facility]     HYSTERECTOMY TOTAL ABD, RHIANNA SALPINGO-OOPHORECTOMY, NODE DISSECTION, TUMOR DEBULKING, COMBINED  2/1/2012    Procedure:COMBINED HYSTERECTOMY TOTAL ABDOMINAL, BILATERAL SALPINGO-OOPHORECTOMY, NODE DISSECTION, TUMOR DEBULKING;  Exploratory Laparotomy, Total Abdominal Hysterectomy, Bilateral Salpingo-Oophorectomy, appendectomy,lysis of adhesions, ileal, ascending, transverse and splenic flexure resection, ileal descending bowel renanastomosis, incidental cystotomy repair, CUSA procedure and colonoscopy ; [place]     INSERT PORT PERITONEAL ACCESS  4/3/2012    Procedure:INSERT PORT PERITONEAL ACCESS; Intraperitoneal Port Placement (c-arm); Surgeon:SAMUEL CARRASCO; Location:[facility] OR     INSERT PORT PERITONEAL ACCESS  5/14/2014    Procedure: INSERT PORT PERITONEAL ACCESS;  Surgeon: Nga Yeung MD;  Location: [facility]     INSERT PORT VASCULAR ACCESS       LAPAROSCOPY DIAGNOSTIC (GYN)  5/14/2014    Procedure: LAPAROSCOPY DIAGNOSTIC (GYN);  Surgeon: Nga Yeung MD;  [facility]     LAPAROTOMY EXPLORATORY Right 11/25/2015    Procedure: LAPAROTOMY EXPLORATORY;  Surgeon: Nga Yeung MD;   [facility]     LAPAROTOMY, TUMOR DEBULKING, COMBINED  5/14/2014    Procedure: COMBINED LAPAROTOMY, TUMOR DEBULKING;  Surgeon: Nga Yeung MD;  [facility]     REMOVE CATHETER PERITONEAL N/A 8/20/2014    Procedure: REMOVE CATHETER PERITONEAL;  Surgeon: Nga Yeung MD;  [facility]     VASCULAR SURGERY      stent left iliac vein       Current Outpatient Medications   Medication     albuterol (PROAIR HFA/PROVENTIL HFA/VENTOLIN HFA) 108 (90 Base) MCG/ACT inhaler     Ascorbic Acid (VITAMIN C PO)     Calcium Carbonate-Vitamin D (CALCIUM + D PO)     cyanocobalamin (VITAMIN B12) 1000 MCG/ML injection     diphenoxylate-atropine (LOMOTIL) 2.5-0.025 MG per tablet     Ferrous Sulfate 324 (65 Fe) MG TBEC     HERBALS     HYDROcodone-acetaminophen (NORCO) 5-325 MG tablet     LEVOTHYROXINE SODIUM PO     loperamide (IMODIUM) 2 MG capsule     LORazepam (ATIVAN) 1 MG tablet     magnesium oxide (MAG-OX) 400 MG tablet     omeprazole (PRILOSEC) 20 MG CR capsule     order for DME     potassium chloride (KLOR-CON) 20 MEQ packet     VITAMIN E NATURAL PO     No current facility-administered medications for this visit.      Facility-Administered Medications Ordered in Other Visits   Medication     heparin 100 UNIT/ML injection 500 Units          No Known Allergies    Family History   Problem Relation Age of Onset     Cancer Mother 69        lung, smoker     Cancer Maternal Uncle 65        brain     Colon Cancer Maternal Aunt 80        colon       Social History     Socioeconomic History     Marital status:      Spouse name: Not on file     Number of children: Not on file     Years of education: Not on file     Highest education level: Not on file   Social Needs     Financial resource strain: Not on file     Food insecurity - worry: Not on file     Food insecurity - inability: Not on file     Transportation needs - medical: Not on file     Transportation needs - non-medical: Not on file   Occupational  History     Not on file   Tobacco Use     Smoking status: Former Smoker     Years: 8.00     Types: Cigarettes     Last attempt to quit: 1980     Years since quittin.5     Smokeless tobacco: Never Used     Tobacco comment: started at 11 yo and quit at 18 yo   Substance and Sexual Activity     Alcohol use: Yes     Comment: 3x/day wine or jodi     Drug use: No     Sexual activity: Not on file   Other Topics Concern     Parent/sibling w/ CABG, MI or angioplasty before 65F 55M? Not Asked   Social History Narrative     Not on file       Review of Systems     Constitutional:  Positive for fatigue. Negative for fever, chills, weight loss, weight gain, decreased appetite, night sweats, recent stressors, height gain, height loss, post-operative complications, incisional pain, hallucinations, increased energy, hyperactivity and confused.   HENT:  Negative for ear pain, hearing loss, tinnitus, nosebleeds, trouble swallowing, hoarse voice, mouth sores, sore throat, ear discharge, tooth pain, gum tenderness, taste disturbance, smell disturbance, hearing aid, bleeding gums, dry mouth, sinus pain, sinus congestion and neck mass.    Eyes:  Negative for double vision, pain, redness, eye pain, decreased vision, eye watering, eye bulging, eye dryness, flashing lights, spots, floaters, strabismus, tunnel vision, jaundice and eye irritation.   Respiratory:   Negative for cough, hemoptysis, sputum production, shortness of breath, wheezing, sleep disturbances due to breathing, snores loudly, respiratory pain, dyspnea on exertion, cough disturbing sleep and postural dyspnea.    Cardiovascular:  Negative for chest pain, dyspnea on exertion, palpitations, orthopnea, claudication, leg swelling, fingers/toes turn blue, hypertension, hypotension, syncope, history of heart murmur, chest pain on exertion, chest pain at rest, pacemaker, few scattered varicosities, leg pain, sleep disturbances due to breathing, tachycardia,  "light-headedness, exercise intolerance and edema.   Gastrointestinal:  Positive for diarrhea. Negative for heartburn, nausea, vomiting, abdominal pain, constipation, blood in stool, melena, rectal pain, bloating, hemorrhoids, bowel incontinence, jaundice, rectal bleeding, coffee ground emesis and change in stool.   Genitourinary:  Negative for bladder incontinence, dysuria, urgency, hematuria, flank pain, vaginal discharge, difficulty urinating, genital sores, dyspareunia, decreased libido, nocturia, voiding less frequently, arousal difficulty, abnormal vaginal bleeding, excessive menstruation, menstrual changes, hot flashes, vaginal dryness and postmenopausal bleeding.   Musculoskeletal:  Negative for myalgias, back pain, joint swelling, arthralgias, stiffness, muscle cramps, neck pain, bone pain, muscle weakness and fracture.   Skin:  Negative for nail changes, itching, poor wound healing, rash, hair changes, skin changes, acne, warts, poor wound healing, scarring, flaky skin, Raynaud's phenomenon, sensitivity to sunlight and skin thickening.   Neurological:  Positive for tingling. Negative for dizziness, tremors, speech change, seizures, loss of consciousness, weakness, light-headedness, numbness, headaches, disturbances in coordination, extremity numbness, memory loss, difficulty walking and paralysis.   Endo/Heme:  Negative for anemia, swollen glands and bruises/bleeds easily.   Psychiatric/Behavioral:  Negative for depression, hallucinations, memory loss, decreased concentration, mood swings and panic attacks.    Breast:  Negative for breast discharge, breast mass, breast pain and nipple retraction.   Endocrine:  Negative for altered temperature regulation, polyphagia, polydipsia, unwanted hair growth and change in facial hair.        Physical Exam:    /80   Pulse 85   Temp 97.9  F (36.6  C) (Oral)   Resp 16   Ht 1.651 m (5' 5\")   Wt 64.9 kg (143 lb)   SpO2 99%   BMI 23.80 kg/m      General: Alert " non-toxic appearing female in no acute distress  HEENT: Normocephalic, atraumatic; PERRLA; no scleral icterus; anicteric sclera; oropharynx pink without lesions; neck supple  Pulmonary: Lungs clear to auscultation, no increased work of breathing noted  Cardiac: Regular rate and rhythm, S1S2, no clicks, murmurs, rubs, or gallops; bilateral lower extremities without edema  GI: Normoactive bowel sounds x4 quadrants, abdomen soft, non-distended, and non-tender to palpation without masses or organomegaly  : Not indicated  Heme: Cervical, supraclavicular, and inguinal nodes without lymphadenopathy  MSK: Moves all extremities, no obvious muscle wasting  Neuro: No gross deficits, normal gait  Skin: Appropriate color for race, warm and dry, rosacea on cheeks, some redness on neck - from chemo.   Psych: Pleasant and interactive, affect bright, makes appropriate eye contact, thought process linear    Labs:      12/13/18   Hemoglobin 11.7 - 15.7 g/dL 11.0 (L)   Hematocrit 35.0 - 47.0 % 33.7 (L)   Platelet Count 150 - 450 10e9/L 292   Absolute Neutrophil 1.6 - 8.3 10e9/L 3.6   Sodium 133 - 144 mmol/L -   Potassium 3.4 - 5.3 mmol/L 3.9   Chloride 94 - 109 mmol/L -   Carbon Dioxide 20 - 32 mmol/L -   Urea Nitrogen 7 - 30 mg/dL -   Creatinine 0.52 - 1.04 mg/dL 0.8   Calcium 8.5 - 10.1 mg/dL -   Magnesium 1.6 - 2.3 mg/dL 1.0 (L)   Bilirubin Total 0.2 - 1.3 mg/dL -   ALT 0 - 50 U/L -   AST 0 - 45 U/L -   Alkaline Phosphatase 40 - 150 U/L -   Albumin 3.4 - 5.0 g/dL -   Protein Total 6.8 - 8.8 g/dL -   WBC 4.0 - 11.0 10e9/L 6.7    pending    Assessment/Plan:  1) Recurrent stage IIIC bilateral ovarian cancer: Currently feeling well and feels that weekly paclitaxel is tolerable, offering a good quality of life at the moment. She has some numbness in her fingers but it is not worse than before. She also has some numbness and tingling in her toes. I am happy with her decreasing  and although imaging revealed new lesion in  liver, other  Lesions and lymph nodes have shrunk. She would like a break from treatment and she is up for chemo in the future but she is very tired and fatigued now and requesting a break. I suspect that if we stop chemo, her disease will grow but I am in agreement with chemo holiday with her having a break from chemo as she has had extensive treatment. Will plan for her to come back in 2-3 months to discuss treatment.  Reviewed signs and symptoms for when she should contact the clinic or seek additional care, including but not limited to fever, chills, inability to keep down food or fluids, nausea and vomiting not controlled with antiemetics, and diarrhea leading to dehydration. Patient to contact the clinic with any questions or concerns in the interim.     - Mag: chronically low, taking supplement. Will recheck today  - labs/tests ordered: CMP, CBC, Mag,     2) Genetic counseling: Testing has been performed and she is negative for mutations in BRCA1, BRCA2, EPCAM, MLH1, MSH2, MSH6, PMS2, PTEN, and TP53 genes    3) Health maintenance issues discussed include to continue following up with PCP for non-gynecologic concerns.    4) Patient verbalized understanding of and agreement with plan    A total of 30 minutes spent with patient, over 50% spent in counseling and coordination of care.    Nga Yeung MD    Department of Ob/Gyn and Women's Health  Division of Gynecologic Oncology  [facility]  [phone]    Patient Care Team:  Aubrie Hester MD as PCP - General  Tammy Flores RN as Continuity Care Coordinator (Oncology)  Nga Yeung MD as MD (Oncology)  Patricia Rocha APRN CNP as Nurse Practitioner (Nurse Practitioner)    I, Jarek Young, am serving as a scribe to document services personally performed by Nga Yeung MD, based upon my observations and the provider's statements to me. All documentation has been reviewed by the  aforementioned doctor prior to being entered into the official medical record.     I have reviewed the above note and agree with the scribe's notation as written.

## 2018-12-20 ENCOUNTER — ONCOLOGY VISIT (OUTPATIENT)
Dept: ONCOLOGY | Facility: CLINIC | Age: 58
End: 2018-12-20
Attending: OBSTETRICS & GYNECOLOGY
Payer: COMMERCIAL

## 2018-12-20 ENCOUNTER — APPOINTMENT (OUTPATIENT)
Dept: LAB | Facility: CLINIC | Age: 58
End: 2018-12-20
Attending: OBSTETRICS & GYNECOLOGY
Payer: COMMERCIAL

## 2018-12-20 VITALS
HEART RATE: 85 BPM | DIASTOLIC BLOOD PRESSURE: 80 MMHG | TEMPERATURE: 97.9 F | RESPIRATION RATE: 16 BRPM | HEIGHT: 65 IN | SYSTOLIC BLOOD PRESSURE: 135 MMHG | OXYGEN SATURATION: 99 % | WEIGHT: 143 LBS | BODY MASS INDEX: 23.82 KG/M2

## 2018-12-20 DIAGNOSIS — E83.42 HYPOMAGNESEMIA: ICD-10-CM

## 2018-12-20 DIAGNOSIS — C79.9 METASTATIC CANCER (H): Primary | ICD-10-CM

## 2018-12-20 LAB
ALBUMIN SERPL-MCNC: 3.4 G/DL (ref 3.4–5)
ALP SERPL-CCNC: 110 U/L (ref 40–150)
ALT SERPL W P-5'-P-CCNC: 26 U/L (ref 0–50)
ANION GAP SERPL CALCULATED.3IONS-SCNC: 10 MMOL/L (ref 3–14)
AST SERPL W P-5'-P-CCNC: 18 U/L (ref 0–45)
BASOPHILS # BLD AUTO: 0 10E9/L (ref 0–0.2)
BASOPHILS NFR BLD AUTO: 0.3 %
BILIRUB SERPL-MCNC: 0.4 MG/DL (ref 0.2–1.3)
BUN SERPL-MCNC: 19 MG/DL (ref 7–30)
CALCIUM SERPL-MCNC: 8.5 MG/DL (ref 8.5–10.1)
CANCER AG125 SERPL-ACNC: 19 U/ML (ref 0–30)
CHLORIDE SERPL-SCNC: 105 MMOL/L (ref 94–109)
CO2 SERPL-SCNC: 22 MMOL/L (ref 20–32)
CREAT SERPL-MCNC: 0.68 MG/DL (ref 0.52–1.04)
DIFFERENTIAL METHOD BLD: ABNORMAL
EOSINOPHIL # BLD AUTO: 0.1 10E9/L (ref 0–0.7)
EOSINOPHIL NFR BLD AUTO: 1.6 %
ERYTHROCYTE [DISTWIDTH] IN BLOOD BY AUTOMATED COUNT: 14.9 % (ref 10–15)
GFR SERPL CREATININE-BSD FRML MDRD: >90 ML/MIN/{1.73_M2}
GLUCOSE SERPL-MCNC: 83 MG/DL (ref 70–99)
HCT VFR BLD AUTO: 31.1 % (ref 35–47)
HGB BLD-MCNC: 10.6 G/DL (ref 11.7–15.7)
IMM GRANULOCYTES # BLD: 0.1 10E9/L (ref 0–0.4)
IMM GRANULOCYTES NFR BLD: 1 %
LYMPHOCYTES # BLD AUTO: 2.1 10E9/L (ref 0.8–5.3)
LYMPHOCYTES NFR BLD AUTO: 32.7 %
MAGNESIUM SERPL-MCNC: 1.1 MG/DL (ref 1.6–2.3)
MCH RBC QN AUTO: 35.6 PG (ref 26.5–33)
MCHC RBC AUTO-ENTMCNC: 34.1 G/DL (ref 31.5–36.5)
MCV RBC AUTO: 104 FL (ref 78–100)
MONOCYTES # BLD AUTO: 0.6 10E9/L (ref 0–1.3)
MONOCYTES NFR BLD AUTO: 9.3 %
NEUTROPHILS # BLD AUTO: 3.5 10E9/L (ref 1.6–8.3)
NEUTROPHILS NFR BLD AUTO: 55.1 %
NRBC # BLD AUTO: 0 10*3/UL
NRBC BLD AUTO-RTO: 0 /100
PLATELET # BLD AUTO: 285 10E9/L (ref 150–450)
POTASSIUM SERPL-SCNC: 3.2 MMOL/L (ref 3.4–5.3)
PROT SERPL-MCNC: 6.4 G/DL (ref 6.8–8.8)
RBC # BLD AUTO: 2.98 10E12/L (ref 3.8–5.2)
SODIUM SERPL-SCNC: 136 MMOL/L (ref 133–144)
WBC # BLD AUTO: 6.3 10E9/L (ref 4–11)

## 2018-12-20 PROCEDURE — 99214 OFFICE O/P EST MOD 30 MIN: CPT | Mod: ZP | Performed by: OBSTETRICS & GYNECOLOGY

## 2018-12-20 PROCEDURE — G0463 HOSPITAL OUTPT CLINIC VISIT: HCPCS | Mod: ZF

## 2018-12-20 PROCEDURE — 80053 COMPREHEN METABOLIC PANEL: CPT | Performed by: OBSTETRICS & GYNECOLOGY

## 2018-12-20 PROCEDURE — 86304 IMMUNOASSAY TUMOR CA 125: CPT | Performed by: OBSTETRICS & GYNECOLOGY

## 2018-12-20 PROCEDURE — 85025 COMPLETE CBC W/AUTO DIFF WBC: CPT | Performed by: OBSTETRICS & GYNECOLOGY

## 2018-12-20 PROCEDURE — 83735 ASSAY OF MAGNESIUM: CPT | Performed by: OBSTETRICS & GYNECOLOGY

## 2018-12-20 PROCEDURE — 25000128 H RX IP 250 OP 636: Mod: ZF | Performed by: OBSTETRICS & GYNECOLOGY

## 2018-12-20 PROCEDURE — 36591 DRAW BLOOD OFF VENOUS DEVICE: CPT

## 2018-12-20 RX ORDER — HEPARIN SODIUM (PORCINE) LOCK FLUSH IV SOLN 100 UNIT/ML 100 UNIT/ML
5 SOLUTION INTRAVENOUS EVERY 8 HOURS
Status: DISCONTINUED | OUTPATIENT
Start: 2018-12-20 | End: 2019-01-01 | Stop reason: HOSPADM

## 2018-12-20 RX ADMIN — SODIUM CHLORIDE, PRESERVATIVE FREE 5 ML: 5 INJECTION INTRAVENOUS at 15:14

## 2018-12-20 ASSESSMENT — ENCOUNTER SYMPTOMS
TASTE DISTURBANCE: 0
CONSTIPATION: 0
FEVER: 0
SINUS CONGESTION: 1
TROUBLE SWALLOWING: 0
NAIL CHANGES: 1
NIGHT SWEATS: 0
BLOOD IN STOOL: 0
ALTERED TEMPERATURE REGULATION: 0
WEIGHT GAIN: 0
FATIGUE: 1
BLOATING: 0
DIARRHEA: 1
VOMITING: 0
SMELL DISTURBANCE: 0
HEARTBURN: 1
HOARSE VOICE: 0
SORE THROAT: 0
CHILLS: 0
WEIGHT LOSS: 0
RECTAL PAIN: 0
POLYDIPSIA: 0
POOR WOUND HEALING: 0
SINUS PAIN: 1
NECK MASS: 1
JAUNDICE: 0
INCREASED ENERGY: 1
HALLUCINATIONS: 0
DECREASED APPETITE: 0
ABDOMINAL PAIN: 0
POLYPHAGIA: 0
BOWEL INCONTINENCE: 0
NAUSEA: 0
SKIN CHANGES: 0

## 2018-12-20 ASSESSMENT — MIFFLIN-ST. JEOR: SCORE: 1219.52

## 2018-12-20 ASSESSMENT — PAIN SCALES - GENERAL: PAINLEVEL: MODERATE PAIN (4)

## 2018-12-20 NOTE — PATIENT INSTRUCTIONS
Breast Cancer Screening: During our visit today, we discussed that it is recommended you receive breast cancer screening. Please call or make an appointment with your primary care provider to discuss this with them. You may also call the [facility] scheduling line ([phone]) to set up a mammography appointment at the Breast Center within the [facility].

## 2018-12-20 NOTE — NURSING NOTE
"Oncology Rooming Note    December 20, 2018 1:38 PM   Odalys Nathan is a 58 year old female who presents for:    Chief Complaint   Patient presents with     Oncology Clinic Visit     Return Ovarian Ca     Initial Vitals: /80   Pulse 85   Temp 97.9  F (36.6  C) (Oral)   Resp 16   Ht 1.651 m (5' 5\")   Wt 64.9 kg (143 lb)   SpO2 99%   BMI 23.80 kg/m   Estimated body mass index is 23.8 kg/m  as calculated from the following:    Height as of this encounter: 1.651 m (5' 5\").    Weight as of this encounter: 64.9 kg (143 lb). Body surface area is 1.73 meters squared.  Moderate Pain (4) Comment: Data Unavailable   No LMP recorded. Patient has had a hysterectomy.  Allergies reviewed: Yes  Medications reviewed: Yes    Medications: Medication refills not needed today.  Pharmacy name entered into [email or web address]:    [id number] - [place], [place] - [address] [address] [facility] [place] [address]  [place] [address] [facility] [place], [place] - [address]  [place] [facility] [place], [place] - [address] [address]    Clinical concerns: No new concerns.  Patient fell 2 weeks ago on ice and landed on her tailbone. Pain currently at 4.     6 minutes for nursing intake (face to face time)     Naomi Pelayo [facility]              "

## 2018-12-20 NOTE — NURSING NOTE
Chief Complaint   Patient presents with     Oncology Clinic Visit     Return Ovarian Ca     Port Draw     accessed with Gripper needle, heaprin locked

## 2018-12-28 DIAGNOSIS — B37.31 YEAST INFECTION OF THE VAGINA: Primary | ICD-10-CM

## 2018-12-28 RX ORDER — FLUCONAZOLE 150 MG/1
150 TABLET ORAL ONCE
Qty: 1 TABLET | Refills: 0 | Status: SHIPPED | OUTPATIENT
Start: 2018-12-28 | End: 2019-01-01

## 2019-01-01 ENCOUNTER — HOSPITAL ENCOUNTER (OUTPATIENT)
Facility: CLINIC | Age: 59
Setting detail: SPECIMEN
Discharge: HOME | End: 2019-06-06
Attending: OBSTETRICS & GYNECOLOGY | Admitting: OBSTETRICS & GYNECOLOGY
Payer: COMMERCIAL

## 2019-01-01 ENCOUNTER — OFFICE VISIT (OUTPATIENT)
Dept: INFECTIOUS DISEASES | Facility: CLINIC | Age: 59
End: 2019-01-01
Attending: INTERNAL MEDICINE
Payer: COMMERCIAL

## 2019-01-01 ENCOUNTER — TELEPHONE (OUTPATIENT)
Dept: UROLOGY | Facility: CLINIC | Age: 59
End: 2019-01-01

## 2019-01-01 ENCOUNTER — HOSPITAL ENCOUNTER (OUTPATIENT)
Dept: ULTRASOUND IMAGING | Facility: CLINIC | Age: 59
Discharge: HOME | End: 2019-11-29
Attending: OBSTETRICS & GYNECOLOGY | Admitting: OBSTETRICS & GYNECOLOGY
Payer: COMMERCIAL

## 2019-01-01 ENCOUNTER — TELEPHONE (OUTPATIENT)
Dept: EMERGENCY MEDICINE | Facility: CLINIC | Age: 59
End: 2019-01-01

## 2019-01-01 ENCOUNTER — HOSPITAL ENCOUNTER (OUTPATIENT)
Facility: CLINIC | Age: 59
Discharge: HOME | End: 2019-11-04
Admitting: RADIOLOGY
Payer: COMMERCIAL

## 2019-01-01 ENCOUNTER — PRE VISIT (OUTPATIENT)
Dept: UROLOGY | Facility: CLINIC | Age: 59
End: 2019-01-01

## 2019-01-01 ENCOUNTER — HOSPITAL ENCOUNTER (OUTPATIENT)
Facility: CLINIC | Age: 59
Discharge: HOME | End: 2019-12-09
Attending: UROLOGY | Admitting: UROLOGY
Payer: COMMERCIAL

## 2019-01-01 ENCOUNTER — HOSPITAL ENCOUNTER (OUTPATIENT)
Facility: CLINIC | Age: 59
Setting detail: SPECIMEN
Discharge: HOME | End: 2019-09-05
Attending: NURSE PRACTITIONER | Admitting: NURSE PRACTITIONER
Payer: COMMERCIAL

## 2019-01-01 ENCOUNTER — PATIENT OUTREACH (OUTPATIENT)
Dept: ONCOLOGY | Facility: CLINIC | Age: 59
End: 2019-01-01

## 2019-01-01 ENCOUNTER — APPOINTMENT (OUTPATIENT)
Dept: OCCUPATIONAL THERAPY | Facility: CLINIC | Age: 59
DRG: 025 | End: 2019-01-01
Attending: STUDENT IN AN ORGANIZED HEALTH CARE EDUCATION/TRAINING PROGRAM
Payer: COMMERCIAL

## 2019-01-01 ENCOUNTER — OFFICE VISIT (OUTPATIENT)
Dept: RADIATION ONCOLOGY | Facility: CLINIC | Age: 59
End: 2019-01-01
Attending: RADIOLOGY
Payer: COMMERCIAL

## 2019-01-01 ENCOUNTER — HOME INFUSION (PRE-WILLOW HOME INFUSION) (OUTPATIENT)
Dept: PHARMACY | Facility: CLINIC | Age: 59
End: 2019-01-01

## 2019-01-01 ENCOUNTER — APPOINTMENT (OUTPATIENT)
Dept: CT IMAGING | Facility: CLINIC | Age: 59
DRG: 054 | End: 2019-01-01
Attending: EMERGENCY MEDICINE
Payer: COMMERCIAL

## 2019-01-01 ENCOUNTER — ONCOLOGY VISIT (OUTPATIENT)
Dept: ONCOLOGY | Facility: CLINIC | Age: 59
End: 2019-01-01
Payer: COMMERCIAL

## 2019-01-01 ENCOUNTER — HOSPITAL ENCOUNTER (OUTPATIENT)
Facility: CLINIC | Age: 59
Setting detail: SPECIMEN
Discharge: HOME | End: 2019-05-23
Attending: OBSTETRICS & GYNECOLOGY | Admitting: OBSTETRICS & GYNECOLOGY
Payer: COMMERCIAL

## 2019-01-01 ENCOUNTER — HOSPITAL ENCOUNTER (OUTPATIENT)
Facility: CLINIC | Age: 59
Setting detail: SPECIMEN
End: 2019-09-18
Attending: OBSTETRICS & GYNECOLOGY
Payer: COMMERCIAL

## 2019-01-01 ENCOUNTER — HOSPITAL ENCOUNTER (OUTPATIENT)
Facility: CLINIC | Age: 59
Setting detail: SPECIMEN
Discharge: HOME | End: 2019-09-09
Attending: NURSE PRACTITIONER | Admitting: NURSE PRACTITIONER
Payer: COMMERCIAL

## 2019-01-01 ENCOUNTER — HOSPITAL ENCOUNTER (OUTPATIENT)
Dept: ULTRASOUND IMAGING | Facility: CLINIC | Age: 59
Discharge: HOME | End: 2019-12-09
Attending: OBSTETRICS & GYNECOLOGY | Admitting: OBSTETRICS & GYNECOLOGY
Payer: COMMERCIAL

## 2019-01-01 ENCOUNTER — HOSPITAL ENCOUNTER (OUTPATIENT)
Facility: CLINIC | Age: 59
Setting detail: SPECIMEN
Discharge: HOME | End: 2019-01-22
Attending: OBSTETRICS & GYNECOLOGY | Admitting: OBSTETRICS & GYNECOLOGY
Payer: COMMERCIAL

## 2019-01-01 ENCOUNTER — HOSPITAL ENCOUNTER (OUTPATIENT)
Dept: ULTRASOUND IMAGING | Facility: CLINIC | Age: 59
Discharge: HOME | End: 2019-11-20
Attending: UROLOGY | Admitting: UROLOGY
Payer: COMMERCIAL

## 2019-01-01 ENCOUNTER — INFUSION THERAPY VISIT (OUTPATIENT)
Dept: INFUSION THERAPY | Facility: CLINIC | Age: 59
End: 2019-01-01
Attending: OBSTETRICS & GYNECOLOGY
Payer: COMMERCIAL

## 2019-01-01 ENCOUNTER — TELEPHONE (OUTPATIENT)
Dept: ONCOLOGY | Facility: CLINIC | Age: 59
End: 2019-01-01

## 2019-01-01 ENCOUNTER — APPOINTMENT (OUTPATIENT)
Dept: GENERAL RADIOLOGY | Facility: CLINIC | Age: 59
End: 2019-01-01
Attending: INTERNAL MEDICINE
Payer: COMMERCIAL

## 2019-01-01 ENCOUNTER — TELEPHONE (OUTPATIENT)
Dept: RADIATION ONCOLOGY | Facility: CLINIC | Age: 59
End: 2019-01-01

## 2019-01-01 ENCOUNTER — TELEPHONE (OUTPATIENT)
Dept: GASTROENTEROLOGY | Facility: CLINIC | Age: 59
End: 2019-01-01

## 2019-01-01 ENCOUNTER — MYC MEDICAL ADVICE (OUTPATIENT)
Dept: ONCOLOGY | Facility: CLINIC | Age: 59
End: 2019-01-01

## 2019-01-01 ENCOUNTER — ONCOLOGY VISIT (OUTPATIENT)
Dept: ONCOLOGY | Facility: CLINIC | Age: 59
End: 2019-01-01
Attending: OBSTETRICS & GYNECOLOGY
Payer: COMMERCIAL

## 2019-01-01 ENCOUNTER — APPOINTMENT (OUTPATIENT)
Dept: GENERAL RADIOLOGY | Facility: CLINIC | Age: 59
DRG: 444 | End: 2019-01-01
Attending: OBSTETRICS & GYNECOLOGY
Payer: COMMERCIAL

## 2019-01-01 ENCOUNTER — HOSPITAL ENCOUNTER (OUTPATIENT)
Facility: CLINIC | Age: 59
Setting detail: SPECIMEN
Discharge: HOME | End: 2019-07-15
Attending: OBSTETRICS & GYNECOLOGY | Admitting: OBSTETRICS & GYNECOLOGY
Payer: COMMERCIAL

## 2019-01-01 ENCOUNTER — HOSPITAL ENCOUNTER (OUTPATIENT)
Facility: CLINIC | Age: 59
End: 2019-01-01
Attending: INTERNAL MEDICINE | Admitting: INTERNAL MEDICINE
Payer: COMMERCIAL

## 2019-01-01 ENCOUNTER — APPOINTMENT (OUTPATIENT)
Dept: GENERAL RADIOLOGY | Facility: CLINIC | Age: 59
End: 2019-01-01
Attending: UROLOGY
Payer: COMMERCIAL

## 2019-01-01 ENCOUNTER — APPOINTMENT (OUTPATIENT)
Dept: GENERAL RADIOLOGY | Facility: CLINIC | Age: 59
End: 2019-01-01
Attending: OBSTETRICS & GYNECOLOGY
Payer: COMMERCIAL

## 2019-01-01 ENCOUNTER — HOSPITAL ENCOUNTER (OUTPATIENT)
Facility: CLINIC | Age: 59
Discharge: HOME | End: 2019-11-11
Attending: OBSTETRICS & GYNECOLOGY | Admitting: OBSTETRICS & GYNECOLOGY
Payer: COMMERCIAL

## 2019-01-01 ENCOUNTER — HOSPITAL ENCOUNTER (OUTPATIENT)
Facility: CLINIC | Age: 59
Setting detail: SPECIMEN
End: 2019-01-01
Attending: OBSTETRICS & GYNECOLOGY
Payer: COMMERCIAL

## 2019-01-01 ENCOUNTER — APPOINTMENT (OUTPATIENT)
Dept: MRI IMAGING | Facility: CLINIC | Age: 59
DRG: 025 | End: 2019-01-01
Attending: STUDENT IN AN ORGANIZED HEALTH CARE EDUCATION/TRAINING PROGRAM
Payer: COMMERCIAL

## 2019-01-01 ENCOUNTER — OFFICE VISIT (OUTPATIENT)
Dept: PALLIATIVE CARE | Facility: CLINIC | Age: 59
End: 2019-01-01
Attending: INTERNAL MEDICINE
Payer: COMMERCIAL

## 2019-01-01 ENCOUNTER — HOSPITAL ENCOUNTER (OUTPATIENT)
Facility: CLINIC | Age: 59
Setting detail: SPECIMEN
Discharge: HOME | End: 2019-08-22
Attending: NURSE PRACTITIONER | Admitting: NURSE PRACTITIONER
Payer: COMMERCIAL

## 2019-01-01 ENCOUNTER — HOSPITAL ENCOUNTER (OUTPATIENT)
Facility: CLINIC | Age: 59
Setting detail: SPECIMEN
Discharge: HOME | End: 2019-05-30
Attending: OBSTETRICS & GYNECOLOGY | Admitting: OBSTETRICS & GYNECOLOGY
Payer: COMMERCIAL

## 2019-01-01 ENCOUNTER — HOSPITAL ENCOUNTER (OUTPATIENT)
Dept: ULTRASOUND IMAGING | Facility: CLINIC | Age: 59
Discharge: HOME | End: 2019-11-20
Attending: OBSTETRICS & GYNECOLOGY | Admitting: OBSTETRICS & GYNECOLOGY
Payer: COMMERCIAL

## 2019-01-01 ENCOUNTER — HOSPITAL ENCOUNTER (OUTPATIENT)
Facility: CLINIC | Age: 59
Setting detail: SPECIMEN
Discharge: HOME | End: 2019-05-07
Attending: OBSTETRICS & GYNECOLOGY | Admitting: OBSTETRICS & GYNECOLOGY
Payer: COMMERCIAL

## 2019-01-01 ENCOUNTER — HOSPITAL ENCOUNTER (OUTPATIENT)
Facility: CLINIC | Age: 59
Setting detail: SPECIMEN
Discharge: HOME | End: 2019-03-28
Attending: OBSTETRICS & GYNECOLOGY | Admitting: OBSTETRICS & GYNECOLOGY
Payer: COMMERCIAL

## 2019-01-01 ENCOUNTER — HOSPITAL ENCOUNTER (OUTPATIENT)
Facility: CLINIC | Age: 59
Setting detail: SPECIMEN
Discharge: HOME | End: 2019-08-15
Attending: NURSE PRACTITIONER | Admitting: NURSE PRACTITIONER
Payer: COMMERCIAL

## 2019-01-01 ENCOUNTER — TELEPHONE (OUTPATIENT)
Dept: PHARMACY | Facility: PHYSICIAN GROUP | Age: 59
End: 2019-01-01

## 2019-01-01 ENCOUNTER — HOSPITAL ENCOUNTER (OUTPATIENT)
Dept: LAB | Facility: CLINIC | Age: 59
End: 2019-12-06
Attending: OBSTETRICS & GYNECOLOGY
Payer: COMMERCIAL

## 2019-01-01 ENCOUNTER — HOSPITAL ENCOUNTER (EMERGENCY)
Facility: CLINIC | Age: 59
Discharge: HOME | End: 2019-11-22
Attending: EMERGENCY MEDICINE | Admitting: EMERGENCY MEDICINE
Payer: COMMERCIAL

## 2019-01-01 ENCOUNTER — HOSPITAL ENCOUNTER (OUTPATIENT)
Dept: ULTRASOUND IMAGING | Facility: CLINIC | Age: 59
Discharge: HOME | End: 2019-12-04
Attending: OBSTETRICS & GYNECOLOGY | Admitting: OBSTETRICS & GYNECOLOGY
Payer: COMMERCIAL

## 2019-01-01 ENCOUNTER — HOSPITAL ENCOUNTER (OUTPATIENT)
Facility: CLINIC | Age: 59
Setting detail: SPECIMEN
Discharge: HOME | End: 2019-03-20
Attending: OBSTETRICS & GYNECOLOGY | Admitting: OBSTETRICS & GYNECOLOGY
Payer: COMMERCIAL

## 2019-01-01 ENCOUNTER — HOSPITAL ENCOUNTER (OUTPATIENT)
Dept: ULTRASOUND IMAGING | Facility: CLINIC | Age: 59
Discharge: HOME | End: 2019-08-14
Attending: NURSE PRACTITIONER | Admitting: NURSE PRACTITIONER
Payer: COMMERCIAL

## 2019-01-01 ENCOUNTER — HOSPITAL ENCOUNTER (OUTPATIENT)
Dept: GENERAL RADIOLOGY | Facility: CLINIC | Age: 59
End: 2019-12-11
Attending: OBSTETRICS & GYNECOLOGY
Payer: COMMERCIAL

## 2019-01-01 ENCOUNTER — INFUSION THERAPY VISIT (OUTPATIENT)
Dept: INFUSION THERAPY | Facility: CLINIC | Age: 59
End: 2019-01-01
Attending: NURSE PRACTITIONER
Payer: COMMERCIAL

## 2019-01-01 ENCOUNTER — HOSPITAL ENCOUNTER (OUTPATIENT)
Facility: CLINIC | Age: 59
Setting detail: SPECIMEN
Discharge: HOME | End: 2019-02-19
Attending: NURSE PRACTITIONER | Admitting: NURSE PRACTITIONER
Payer: COMMERCIAL

## 2019-01-01 ENCOUNTER — HOSPITAL ENCOUNTER (OUTPATIENT)
Dept: ULTRASOUND IMAGING | Facility: CLINIC | Age: 59
Discharge: HOME | End: 2019-11-08
Attending: OBSTETRICS & GYNECOLOGY | Admitting: OBSTETRICS & GYNECOLOGY
Payer: COMMERCIAL

## 2019-01-01 ENCOUNTER — OFFICE VISIT (OUTPATIENT)
Dept: RADIATION ONCOLOGY | Facility: CLINIC | Age: 59
DRG: 054 | End: 2019-01-01
Attending: RADIOLOGY
Payer: COMMERCIAL

## 2019-01-01 ENCOUNTER — HOSPITAL ENCOUNTER (OUTPATIENT)
Dept: ULTRASOUND IMAGING | Facility: CLINIC | Age: 59
Discharge: HOME | End: 2019-11-14
Attending: OBSTETRICS & GYNECOLOGY | Admitting: OBSTETRICS & GYNECOLOGY
Payer: COMMERCIAL

## 2019-01-01 ENCOUNTER — APPOINTMENT (OUTPATIENT)
Dept: ULTRASOUND IMAGING | Facility: CLINIC | Age: 59
DRG: 444 | End: 2019-01-01
Attending: OBSTETRICS & GYNECOLOGY
Payer: COMMERCIAL

## 2019-01-01 ENCOUNTER — ANESTHESIA EVENT (OUTPATIENT)
Dept: MEDSURG UNIT | Facility: CLINIC | Age: 59
End: 2019-01-01

## 2019-01-01 ENCOUNTER — HOSPITAL ENCOUNTER (OUTPATIENT)
Facility: CLINIC | Age: 59
Setting detail: SPECIMEN
Discharge: HOME | End: 2019-08-29
Attending: NURSE PRACTITIONER | Admitting: NURSE PRACTITIONER
Payer: COMMERCIAL

## 2019-01-01 ENCOUNTER — APPOINTMENT (OUTPATIENT)
Dept: LAB | Facility: CLINIC | Age: 59
End: 2019-01-01
Attending: OBSTETRICS & GYNECOLOGY
Payer: COMMERCIAL

## 2019-01-01 ENCOUNTER — CARE COORDINATION (OUTPATIENT)
Dept: GASTROENTEROLOGY | Facility: CLINIC | Age: 59
End: 2019-01-01

## 2019-01-01 ENCOUNTER — HOSPITAL ENCOUNTER (OUTPATIENT)
Dept: CT IMAGING | Facility: CLINIC | Age: 59
End: 2019-03-20
Attending: OBSTETRICS & GYNECOLOGY
Payer: COMMERCIAL

## 2019-01-01 ENCOUNTER — ANESTHESIA (OUTPATIENT)
Dept: SURGERY | Facility: CLINIC | Age: 59
End: 2019-01-01
Payer: COMMERCIAL

## 2019-01-01 ENCOUNTER — HOSPITAL ENCOUNTER (OUTPATIENT)
Facility: CLINIC | Age: 59
Setting detail: SPECIMEN
End: 2019-09-26
Attending: NURSE PRACTITIONER
Payer: COMMERCIAL

## 2019-01-01 ENCOUNTER — HOSPITAL ENCOUNTER (OUTPATIENT)
Dept: ULTRASOUND IMAGING | Facility: CLINIC | Age: 59
Discharge: HOME | End: 2019-11-27
Attending: OBSTETRICS & GYNECOLOGY | Admitting: OBSTETRICS & GYNECOLOGY
Payer: COMMERCIAL

## 2019-01-01 ENCOUNTER — HOSPITAL ENCOUNTER (OUTPATIENT)
Dept: GENERAL RADIOLOGY | Facility: CLINIC | Age: 59
End: 2019-11-14
Attending: PHYSICIAN ASSISTANT
Payer: COMMERCIAL

## 2019-01-01 ENCOUNTER — HOSPITAL ENCOUNTER (INPATIENT)
Facility: CLINIC | Age: 59
LOS: 3 days | Discharge: HOME | DRG: 444 | End: 2019-10-31
Attending: OBSTETRICS & GYNECOLOGY | Admitting: OBSTETRICS & GYNECOLOGY
Payer: COMMERCIAL

## 2019-01-01 ENCOUNTER — APPOINTMENT (OUTPATIENT)
Dept: GENERAL RADIOLOGY | Facility: CLINIC | Age: 59
DRG: 444 | End: 2019-01-01
Attending: STUDENT IN AN ORGANIZED HEALTH CARE EDUCATION/TRAINING PROGRAM
Payer: COMMERCIAL

## 2019-01-01 ENCOUNTER — CARE COORDINATION (OUTPATIENT)
Dept: ONCOLOGY | Facility: CLINIC | Age: 59
End: 2019-01-01

## 2019-01-01 ENCOUNTER — HOSPITAL ENCOUNTER (OUTPATIENT)
Dept: ULTRASOUND IMAGING | Facility: CLINIC | Age: 59
Discharge: HOME | End: 2019-11-25
Attending: OBSTETRICS & GYNECOLOGY | Admitting: OBSTETRICS & GYNECOLOGY
Payer: COMMERCIAL

## 2019-01-01 ENCOUNTER — APPOINTMENT (OUTPATIENT)
Dept: INTERVENTIONAL RADIOLOGY/VASCULAR | Facility: CLINIC | Age: 59
DRG: 025 | End: 2019-01-01
Attending: PHYSICIAN ASSISTANT
Payer: COMMERCIAL

## 2019-01-01 ENCOUNTER — HOSPITAL ENCOUNTER (OUTPATIENT)
Facility: CLINIC | Age: 59
Setting detail: SPECIMEN
End: 2019-09-19
Attending: NURSE PRACTITIONER
Payer: COMMERCIAL

## 2019-01-01 ENCOUNTER — HOSPITAL ENCOUNTER (OUTPATIENT)
Facility: CLINIC | Age: 59
Setting detail: SPECIMEN
Discharge: HOME | End: 2019-06-20
Attending: OBSTETRICS & GYNECOLOGY | Admitting: OBSTETRICS & GYNECOLOGY
Payer: COMMERCIAL

## 2019-01-01 ENCOUNTER — PATIENT OUTREACH (OUTPATIENT)
Dept: UROLOGY | Facility: CLINIC | Age: 59
End: 2019-01-01

## 2019-01-01 ENCOUNTER — HOSPITAL ENCOUNTER (OUTPATIENT)
Dept: GENERAL RADIOLOGY | Facility: CLINIC | Age: 59
End: 2019-11-20
Attending: RADIOLOGY
Payer: COMMERCIAL

## 2019-01-01 ENCOUNTER — HOSPITAL ENCOUNTER (OUTPATIENT)
Facility: CLINIC | Age: 59
Setting detail: SPECIMEN
Discharge: HOME | End: 2019-10-22
Attending: NURSE PRACTITIONER | Admitting: STUDENT IN AN ORGANIZED HEALTH CARE EDUCATION/TRAINING PROGRAM
Payer: COMMERCIAL

## 2019-01-01 ENCOUNTER — TELEPHONE (OUTPATIENT)
Dept: MULTI SPECIALTY CLINIC | Facility: CLINIC | Age: 59
End: 2019-01-01

## 2019-01-01 ENCOUNTER — HOSPITAL ENCOUNTER (OUTPATIENT)
Dept: CT IMAGING | Facility: CLINIC | Age: 59
Discharge: HOME | End: 2019-07-11
Attending: OBSTETRICS & GYNECOLOGY | Admitting: OBSTETRICS & GYNECOLOGY
Payer: COMMERCIAL

## 2019-01-01 ENCOUNTER — HOSPITAL ENCOUNTER (OUTPATIENT)
Facility: CLINIC | Age: 59
Setting detail: SPECIMEN
End: 2019-05-21
Attending: OBSTETRICS & GYNECOLOGY
Payer: COMMERCIAL

## 2019-01-01 ENCOUNTER — HOSPITAL ENCOUNTER (OUTPATIENT)
Facility: CLINIC | Age: 59
Setting detail: OBSERVATION
Discharge: HOME | End: 2019-10-20
Attending: OBSTETRICS & GYNECOLOGY | Admitting: OBSTETRICS & GYNECOLOGY
Payer: COMMERCIAL

## 2019-01-01 ENCOUNTER — HOSPITAL ENCOUNTER (OUTPATIENT)
Facility: CLINIC | Age: 59
Setting detail: SPECIMEN
Discharge: HOME | End: 2019-05-16
Attending: OBSTETRICS & GYNECOLOGY | Admitting: OBSTETRICS & GYNECOLOGY
Payer: COMMERCIAL

## 2019-01-01 ENCOUNTER — HOSPITAL ENCOUNTER (INPATIENT)
Facility: CLINIC | Age: 59
LOS: 4 days | Discharge: HOME HEALTH | DRG: 054 | End: 2019-09-14
Attending: EMERGENCY MEDICINE | Admitting: OBSTETRICS & GYNECOLOGY
Payer: COMMERCIAL

## 2019-01-01 ENCOUNTER — ANESTHESIA EVENT (OUTPATIENT)
Dept: SURGERY | Facility: CLINIC | Age: 59
End: 2019-01-01
Payer: COMMERCIAL

## 2019-01-01 ENCOUNTER — HOSPITAL ENCOUNTER (OUTPATIENT)
Dept: NUCLEAR MEDICINE | Facility: CLINIC | Age: 59
Setting detail: NUCLEAR MEDICINE
Discharge: HOME | End: 2019-08-19
Attending: OBSTETRICS & GYNECOLOGY | Admitting: OBSTETRICS & GYNECOLOGY
Payer: COMMERCIAL

## 2019-01-01 ENCOUNTER — HOSPITAL ENCOUNTER (OUTPATIENT)
Facility: CLINIC | Age: 59
Setting detail: SPECIMEN
Discharge: HOME | End: 2019-03-11
Attending: OBSTETRICS & GYNECOLOGY | Admitting: OBSTETRICS & GYNECOLOGY
Payer: COMMERCIAL

## 2019-01-01 ENCOUNTER — APPOINTMENT (OUTPATIENT)
Dept: MRI IMAGING | Facility: CLINIC | Age: 59
DRG: 054 | End: 2019-01-01
Attending: RADIOLOGY
Payer: COMMERCIAL

## 2019-01-01 ENCOUNTER — ONCOLOGY VISIT (OUTPATIENT)
Dept: ONCOLOGY | Facility: CLINIC | Age: 59
End: 2019-01-01
Attending: DIETITIAN, REGISTERED
Payer: COMMERCIAL

## 2019-01-01 ENCOUNTER — APPOINTMENT (OUTPATIENT)
Dept: MRI IMAGING | Facility: CLINIC | Age: 59
DRG: 054 | End: 2019-01-01
Attending: EMERGENCY MEDICINE
Payer: COMMERCIAL

## 2019-01-01 ENCOUNTER — TELEPHONE (OUTPATIENT)
Dept: MEDSURG UNIT | Facility: CLINIC | Age: 59
End: 2019-01-01

## 2019-01-01 ENCOUNTER — ANESTHESIA EVENT (OUTPATIENT)
Dept: SURGERY | Facility: CLINIC | Age: 59
DRG: 025 | End: 2019-01-01
Payer: COMMERCIAL

## 2019-01-01 ENCOUNTER — ONCOLOGY VISIT (OUTPATIENT)
Dept: ONCOLOGY | Facility: CLINIC | Age: 59
End: 2019-01-01
Attending: NURSE PRACTITIONER
Payer: COMMERCIAL

## 2019-01-01 ENCOUNTER — TELEPHONE (OUTPATIENT)
Dept: INTERVENTIONAL RADIOLOGY/VASCULAR | Facility: CLINIC | Age: 59
End: 2019-01-01

## 2019-01-01 ENCOUNTER — APPOINTMENT (OUTPATIENT)
Dept: GENERAL RADIOLOGY | Facility: CLINIC | Age: 59
DRG: 025 | End: 2019-01-01
Attending: NURSE PRACTITIONER
Payer: COMMERCIAL

## 2019-01-01 ENCOUNTER — HOSPITAL ENCOUNTER (OUTPATIENT)
Facility: CLINIC | Age: 59
Setting detail: SPECIMEN
Discharge: HOME | End: 2019-07-03
Attending: OBSTETRICS & GYNECOLOGY | Admitting: OBSTETRICS & GYNECOLOGY
Payer: COMMERCIAL

## 2019-01-01 ENCOUNTER — APPOINTMENT (OUTPATIENT)
Dept: ULTRASOUND IMAGING | Facility: CLINIC | Age: 59
DRG: 435 | End: 2019-01-01
Attending: INTERNAL MEDICINE
Payer: COMMERCIAL

## 2019-01-01 ENCOUNTER — APPOINTMENT (OUTPATIENT)
Dept: GENERAL RADIOLOGY | Facility: CLINIC | Age: 59
End: 2019-01-01
Attending: EMERGENCY MEDICINE
Payer: COMMERCIAL

## 2019-01-01 ENCOUNTER — APPOINTMENT (OUTPATIENT)
Dept: CT IMAGING | Facility: CLINIC | Age: 59
DRG: 444 | End: 2019-01-01
Attending: OBSTETRICS & GYNECOLOGY
Payer: COMMERCIAL

## 2019-01-01 ENCOUNTER — HOSPITAL ENCOUNTER (OUTPATIENT)
Facility: CLINIC | Age: 59
Setting detail: SPECIMEN
Discharge: HOME | End: 2019-04-23
Attending: OBSTETRICS & GYNECOLOGY | Admitting: OBSTETRICS & GYNECOLOGY
Payer: COMMERCIAL

## 2019-01-01 ENCOUNTER — HOSPITAL ENCOUNTER (OUTPATIENT)
Dept: ULTRASOUND IMAGING | Facility: CLINIC | Age: 59
Discharge: HOME | End: 2019-12-11
Attending: OBSTETRICS & GYNECOLOGY | Admitting: OBSTETRICS & GYNECOLOGY
Payer: COMMERCIAL

## 2019-01-01 ENCOUNTER — APPOINTMENT (OUTPATIENT)
Dept: CARDIOLOGY | Facility: CLINIC | Age: 59
DRG: 025 | End: 2019-01-01
Attending: STUDENT IN AN ORGANIZED HEALTH CARE EDUCATION/TRAINING PROGRAM
Payer: COMMERCIAL

## 2019-01-01 ENCOUNTER — HOSPITAL ENCOUNTER (INPATIENT)
Facility: CLINIC | Age: 59
LOS: 3 days | Discharge: HOME HEALTH | DRG: 025 | End: 2019-09-21
Attending: NEUROLOGICAL SURGERY | Admitting: NEUROLOGICAL SURGERY
Payer: COMMERCIAL

## 2019-01-01 ENCOUNTER — ANCILLARY PROCEDURE (OUTPATIENT)
Dept: RADIOLOGY | Facility: AMBULATORY SURGERY CENTER | Age: 59
End: 2019-01-01
Attending: UROLOGY
Payer: COMMERCIAL

## 2019-01-01 ENCOUNTER — HOSPITAL ENCOUNTER (OUTPATIENT)
Facility: CLINIC | Age: 59
Setting detail: SPECIMEN
Discharge: HOME | End: 2019-08-12
Attending: NURSE PRACTITIONER | Admitting: NURSE PRACTITIONER
Payer: COMMERCIAL

## 2019-01-01 ENCOUNTER — PATIENT OUTREACH (OUTPATIENT)
Dept: CARE COORDINATION | Facility: CLINIC | Age: 59
End: 2019-01-01

## 2019-01-01 ENCOUNTER — APPOINTMENT (OUTPATIENT)
Dept: PHYSICAL THERAPY | Facility: CLINIC | Age: 59
DRG: 444 | End: 2019-01-01
Attending: NURSE PRACTITIONER
Payer: COMMERCIAL

## 2019-01-01 ENCOUNTER — TELEPHONE (OUTPATIENT)
Dept: PALLIATIVE CARE | Facility: CLINIC | Age: 59
End: 2019-01-01

## 2019-01-01 ENCOUNTER — HOSPITAL ENCOUNTER (OUTPATIENT)
Facility: AMBULATORY SURGERY CENTER | Age: 59
End: 2019-10-04
Attending: UROLOGY
Payer: COMMERCIAL

## 2019-01-01 ENCOUNTER — HOSPITAL ENCOUNTER (OUTPATIENT)
Dept: GENERAL RADIOLOGY | Facility: CLINIC | Age: 59
Discharge: HOME | End: 2019-08-08
Attending: NURSE PRACTITIONER | Admitting: NURSE PRACTITIONER
Payer: COMMERCIAL

## 2019-01-01 ENCOUNTER — APPOINTMENT (OUTPATIENT)
Dept: GENERAL RADIOLOGY | Facility: CLINIC | Age: 59
End: 2019-01-01
Attending: PHYSICIAN ASSISTANT
Payer: COMMERCIAL

## 2019-01-01 ENCOUNTER — HOSPITAL ENCOUNTER (EMERGENCY)
Facility: CLINIC | Age: 59
Discharge: HOME | End: 2019-10-03
Attending: EMERGENCY MEDICINE | Admitting: EMERGENCY MEDICINE
Payer: COMMERCIAL

## 2019-01-01 ENCOUNTER — OFFICE VISIT (OUTPATIENT)
Dept: RADIATION ONCOLOGY | Facility: CLINIC | Age: 59
End: 2019-01-01
Attending: NEUROLOGICAL SURGERY
Payer: COMMERCIAL

## 2019-01-01 ENCOUNTER — HOSPITAL ENCOUNTER (OUTPATIENT)
Dept: ULTRASOUND IMAGING | Facility: CLINIC | Age: 59
Discharge: HOME | End: 2019-11-18
Attending: OBSTETRICS & GYNECOLOGY | Admitting: OBSTETRICS & GYNECOLOGY
Payer: COMMERCIAL

## 2019-01-01 ENCOUNTER — APPOINTMENT (OUTPATIENT)
Dept: CT IMAGING | Facility: CLINIC | Age: 59
DRG: 435 | End: 2019-01-01
Attending: EMERGENCY MEDICINE
Payer: COMMERCIAL

## 2019-01-01 ENCOUNTER — HOSPITAL ENCOUNTER (OUTPATIENT)
Facility: CLINIC | Age: 59
Discharge: HOME | End: 2019-08-23
Attending: INTERNAL MEDICINE | Admitting: INTERNAL MEDICINE
Payer: COMMERCIAL

## 2019-01-01 ENCOUNTER — HOSPITAL ENCOUNTER (OUTPATIENT)
Dept: ULTRASOUND IMAGING | Facility: CLINIC | Age: 59
End: 2019-11-11
Attending: OBSTETRICS & GYNECOLOGY | Admitting: OBSTETRICS & GYNECOLOGY
Payer: COMMERCIAL

## 2019-01-01 ENCOUNTER — HOSPITAL ENCOUNTER (OUTPATIENT)
Facility: CLINIC | Age: 59
Setting detail: SPECIMEN
Discharge: HOME | End: 2019-10-17
Attending: NURSE PRACTITIONER | Admitting: OBSTETRICS & GYNECOLOGY
Payer: COMMERCIAL

## 2019-01-01 ENCOUNTER — HOSPITAL ENCOUNTER (INPATIENT)
Facility: CLINIC | Age: 59
LOS: 2 days | Discharge: HOME | DRG: 435 | End: 2019-10-26
Attending: EMERGENCY MEDICINE | Admitting: INTERNAL MEDICINE
Payer: COMMERCIAL

## 2019-01-01 ENCOUNTER — ANCILLARY PROCEDURE (OUTPATIENT)
Dept: ULTRASOUND IMAGING | Facility: CLINIC | Age: 59
End: 2019-01-01
Attending: PHYSICIAN ASSISTANT
Payer: COMMERCIAL

## 2019-01-01 ENCOUNTER — ANESTHESIA (OUTPATIENT)
Dept: SURGERY | Facility: CLINIC | Age: 59
DRG: 025 | End: 2019-01-01
Payer: COMMERCIAL

## 2019-01-01 ENCOUNTER — HOSPITAL ENCOUNTER (OUTPATIENT)
Facility: CLINIC | Age: 59
Setting detail: SPECIMEN
Discharge: HOME | End: 2019-07-24
Attending: NURSE PRACTITIONER | Admitting: NURSE PRACTITIONER
Payer: COMMERCIAL

## 2019-01-01 ENCOUNTER — ANESTHESIA (OUTPATIENT)
Dept: SURGERY | Facility: AMBULATORY SURGERY CENTER | Age: 59
End: 2019-01-01

## 2019-01-01 ENCOUNTER — APPOINTMENT (OUTPATIENT)
Dept: LAB | Facility: CLINIC | Age: 59
End: 2019-01-01
Attending: INTERNAL MEDICINE
Payer: COMMERCIAL

## 2019-01-01 ENCOUNTER — APPOINTMENT (OUTPATIENT)
Dept: GENERAL RADIOLOGY | Facility: CLINIC | Age: 59
DRG: 435 | End: 2019-01-01
Attending: RADIOLOGY
Payer: COMMERCIAL

## 2019-01-01 ENCOUNTER — HOSPITAL ENCOUNTER (OUTPATIENT)
Dept: GENERAL RADIOLOGY | Facility: CLINIC | Age: 59
Discharge: HOME | End: 2019-09-06
Attending: NURSE PRACTITIONER | Admitting: NURSE PRACTITIONER
Payer: COMMERCIAL

## 2019-01-01 ENCOUNTER — HOSPITAL ENCOUNTER (OUTPATIENT)
Dept: ULTRASOUND IMAGING | Facility: CLINIC | Age: 59
Discharge: HOME | End: 2019-11-22
Attending: OBSTETRICS & GYNECOLOGY | Admitting: OBSTETRICS & GYNECOLOGY
Payer: COMMERCIAL

## 2019-01-01 ENCOUNTER — HOSPITAL ENCOUNTER (OUTPATIENT)
Facility: CLINIC | Age: 59
Setting detail: SPECIMEN
Discharge: HOME | End: 2019-06-13
Attending: OBSTETRICS & GYNECOLOGY | Admitting: OBSTETRICS & GYNECOLOGY
Payer: COMMERCIAL

## 2019-01-01 ENCOUNTER — ANESTHESIA (OUTPATIENT)
Dept: MEDSURG UNIT | Facility: CLINIC | Age: 59
End: 2019-01-01

## 2019-01-01 ENCOUNTER — OFFICE VISIT (OUTPATIENT)
Dept: UROLOGY | Facility: CLINIC | Age: 59
End: 2019-01-01
Payer: COMMERCIAL

## 2019-01-01 ENCOUNTER — DOCUMENTATION ONLY (OUTPATIENT)
Dept: OTHER | Facility: CLINIC | Age: 59
End: 2019-01-01

## 2019-01-01 ENCOUNTER — DOCUMENTATION ONLY (OUTPATIENT)
Dept: CARE COORDINATION | Facility: CLINIC | Age: 59
End: 2019-01-01

## 2019-01-01 ENCOUNTER — HOSPITAL ENCOUNTER (OUTPATIENT)
Dept: ULTRASOUND IMAGING | Facility: CLINIC | Age: 59
End: 2019-11-27
Attending: OBSTETRICS & GYNECOLOGY
Payer: COMMERCIAL

## 2019-01-01 ENCOUNTER — HOSPITAL ENCOUNTER (OUTPATIENT)
Dept: ULTRASOUND IMAGING | Facility: CLINIC | Age: 59
End: 2019-11-04
Attending: OBSTETRICS & GYNECOLOGY
Payer: COMMERCIAL

## 2019-01-01 ENCOUNTER — HOSPITAL ENCOUNTER (OUTPATIENT)
Facility: CLINIC | Age: 59
Setting detail: SPECIMEN
Discharge: HOME | End: 2019-04-11
Attending: OBSTETRICS & GYNECOLOGY | Admitting: OBSTETRICS & GYNECOLOGY
Payer: COMMERCIAL

## 2019-01-01 ENCOUNTER — HOSPITAL ENCOUNTER (OUTPATIENT)
Dept: CT IMAGING | Facility: CLINIC | Age: 59
Discharge: HOME | End: 2019-03-20
Attending: OBSTETRICS & GYNECOLOGY | Admitting: OBSTETRICS & GYNECOLOGY
Payer: COMMERCIAL

## 2019-01-01 ENCOUNTER — ONCOLOGY VISIT (OUTPATIENT)
Dept: ONCOLOGY | Facility: CLINIC | Age: 59
DRG: 444 | End: 2019-01-01
Attending: OBSTETRICS & GYNECOLOGY
Payer: COMMERCIAL

## 2019-01-01 ENCOUNTER — ANESTHESIA EVENT (OUTPATIENT)
Dept: SURGERY | Facility: CLINIC | Age: 59
DRG: 444 | End: 2019-01-01
Payer: COMMERCIAL

## 2019-01-01 ENCOUNTER — HOSPITAL ENCOUNTER (OUTPATIENT)
Dept: ULTRASOUND IMAGING | Facility: CLINIC | Age: 59
Discharge: HOME | End: 2019-12-02
Attending: OBSTETRICS & GYNECOLOGY | Admitting: OBSTETRICS & GYNECOLOGY
Payer: COMMERCIAL

## 2019-01-01 ENCOUNTER — APPOINTMENT (OUTPATIENT)
Dept: CT IMAGING | Facility: CLINIC | Age: 59
DRG: 025 | End: 2019-01-01
Attending: STUDENT IN AN ORGANIZED HEALTH CARE EDUCATION/TRAINING PROGRAM
Payer: COMMERCIAL

## 2019-01-01 ENCOUNTER — TRANSFERRED RECORDS (OUTPATIENT)
Dept: HEALTH INFORMATION MANAGEMENT | Facility: CLINIC | Age: 59
End: 2019-01-01

## 2019-01-01 ENCOUNTER — HOSPITAL ENCOUNTER (OUTPATIENT)
Dept: MRI IMAGING | Facility: CLINIC | Age: 59
Discharge: HOME | End: 2019-10-03
Attending: RADIOLOGY | Admitting: RADIOLOGY
Payer: COMMERCIAL

## 2019-01-01 ENCOUNTER — HEALTH MAINTENANCE LETTER (OUTPATIENT)
Age: 59
End: 2019-01-01

## 2019-01-01 ENCOUNTER — HOSPITAL ENCOUNTER (OUTPATIENT)
Facility: CLINIC | Age: 59
Setting detail: SPECIMEN
End: 2019-07-11
Attending: OBSTETRICS & GYNECOLOGY
Payer: COMMERCIAL

## 2019-01-01 ENCOUNTER — ANESTHESIA (OUTPATIENT)
Dept: SURGERY | Facility: CLINIC | Age: 59
DRG: 444 | End: 2019-01-01
Payer: COMMERCIAL

## 2019-01-01 ENCOUNTER — HOSPITAL ENCOUNTER (OUTPATIENT)
Facility: AMBULATORY SURGERY CENTER | Age: 59
End: 2019-01-01
Attending: UROLOGY
Payer: COMMERCIAL

## 2019-01-01 ENCOUNTER — HOSPITAL ENCOUNTER (OUTPATIENT)
Facility: CLINIC | Age: 59
Setting detail: SPECIMEN
End: 2019-09-12
Attending: NURSE PRACTITIONER
Payer: COMMERCIAL

## 2019-01-01 ENCOUNTER — HOSPITAL ENCOUNTER (OUTPATIENT)
Facility: CLINIC | Age: 59
Setting detail: SPECIMEN
End: 2019-08-08
Attending: NURSE PRACTITIONER
Payer: COMMERCIAL

## 2019-01-01 ENCOUNTER — HOSPITAL ENCOUNTER (OUTPATIENT)
Dept: ULTRASOUND IMAGING | Facility: CLINIC | Age: 59
End: 2019-11-18
Attending: OBSTETRICS & GYNECOLOGY
Payer: COMMERCIAL

## 2019-01-01 ENCOUNTER — HOSPITAL ENCOUNTER (OUTPATIENT)
Dept: ULTRASOUND IMAGING | Facility: CLINIC | Age: 59
Discharge: HOME | End: 2019-12-06
Attending: OBSTETRICS & GYNECOLOGY | Admitting: OBSTETRICS & GYNECOLOGY
Payer: COMMERCIAL

## 2019-01-01 ENCOUNTER — HOSPITAL ENCOUNTER (OUTPATIENT)
Facility: CLINIC | Age: 59
Setting detail: SPECIMEN
Discharge: HOME | End: 2019-06-27
Attending: OBSTETRICS & GYNECOLOGY | Admitting: OBSTETRICS & GYNECOLOGY
Payer: COMMERCIAL

## 2019-01-01 ENCOUNTER — APPOINTMENT (OUTPATIENT)
Dept: CARDIOLOGY | Facility: CLINIC | Age: 59
DRG: 435 | End: 2019-01-01
Attending: INTERNAL MEDICINE
Payer: COMMERCIAL

## 2019-01-01 ENCOUNTER — HOSPITAL ENCOUNTER (OUTPATIENT)
Facility: CLINIC | Age: 59
Setting detail: SPECIMEN
Discharge: HOME | End: 2019-04-04
Attending: OBSTETRICS & GYNECOLOGY | Admitting: OBSTETRICS & GYNECOLOGY
Payer: COMMERCIAL

## 2019-01-01 ENCOUNTER — APPOINTMENT (OUTPATIENT)
Dept: GENERAL RADIOLOGY | Facility: CLINIC | Age: 59
End: 2019-01-01
Attending: RADIOLOGY
Payer: COMMERCIAL

## 2019-01-01 ENCOUNTER — HOSPITAL ENCOUNTER (OUTPATIENT)
Facility: CLINIC | Age: 59
Setting detail: SPECIMEN
Discharge: HOME | End: 2019-04-17
Attending: OBSTETRICS & GYNECOLOGY | Admitting: OBSTETRICS & GYNECOLOGY
Payer: COMMERCIAL

## 2019-01-01 ENCOUNTER — ANESTHESIA EVENT (OUTPATIENT)
Dept: SURGERY | Facility: AMBULATORY SURGERY CENTER | Age: 59
End: 2019-01-01

## 2019-01-01 ENCOUNTER — HOSPITAL ENCOUNTER (OUTPATIENT)
Facility: CLINIC | Age: 59
Setting detail: SPECIMEN
Discharge: HOME | End: 2019-09-16
Attending: OBSTETRICS & GYNECOLOGY | Admitting: OBSTETRICS & GYNECOLOGY
Payer: COMMERCIAL

## 2019-01-01 VITALS — DIASTOLIC BLOOD PRESSURE: 56 MMHG | HEART RATE: 93 BPM | OXYGEN SATURATION: 99 % | SYSTOLIC BLOOD PRESSURE: 88 MMHG

## 2019-01-01 VITALS
TEMPERATURE: 98.1 F | DIASTOLIC BLOOD PRESSURE: 57 MMHG | DIASTOLIC BLOOD PRESSURE: 60 MMHG | BODY MASS INDEX: 23.65 KG/M2 | RESPIRATION RATE: 16 BRPM | HEART RATE: 87 BPM | WEIGHT: 142.1 LBS | SYSTOLIC BLOOD PRESSURE: 94 MMHG | OXYGEN SATURATION: 98 % | RESPIRATION RATE: 16 BRPM | OXYGEN SATURATION: 98 % | SYSTOLIC BLOOD PRESSURE: 112 MMHG | TEMPERATURE: 97.1 F

## 2019-01-01 VITALS — HEART RATE: 94 BPM | SYSTOLIC BLOOD PRESSURE: 102 MMHG | OXYGEN SATURATION: 96 % | DIASTOLIC BLOOD PRESSURE: 71 MMHG

## 2019-01-01 VITALS
OXYGEN SATURATION: 98 % | SYSTOLIC BLOOD PRESSURE: 131 MMHG | HEIGHT: 65 IN | DIASTOLIC BLOOD PRESSURE: 80 MMHG | RESPIRATION RATE: 16 BRPM | HEART RATE: 88 BPM | BODY MASS INDEX: 21.3 KG/M2 | TEMPERATURE: 98.2 F | WEIGHT: 127.87 LBS

## 2019-01-01 VITALS
WEIGHT: 134.2 LBS | DIASTOLIC BLOOD PRESSURE: 87 MMHG | BODY MASS INDEX: 22.33 KG/M2 | TEMPERATURE: 97.9 F | OXYGEN SATURATION: 97 % | RESPIRATION RATE: 18 BRPM | SYSTOLIC BLOOD PRESSURE: 123 MMHG | HEART RATE: 66 BPM

## 2019-01-01 VITALS
SYSTOLIC BLOOD PRESSURE: 101 MMHG | TEMPERATURE: 99 F | DIASTOLIC BLOOD PRESSURE: 59 MMHG | BODY MASS INDEX: 22.83 KG/M2 | OXYGEN SATURATION: 97 % | HEART RATE: 75 BPM | WEIGHT: 137.2 LBS | RESPIRATION RATE: 16 BRPM

## 2019-01-01 VITALS
WEIGHT: 129.9 LBS | DIASTOLIC BLOOD PRESSURE: 60 MMHG | TEMPERATURE: 96.9 F | RESPIRATION RATE: 16 BRPM | HEIGHT: 65 IN | HEART RATE: 98 BPM | SYSTOLIC BLOOD PRESSURE: 92 MMHG | BODY MASS INDEX: 21.64 KG/M2 | OXYGEN SATURATION: 97 %

## 2019-01-01 VITALS
WEIGHT: 141 LBS | BODY MASS INDEX: 23.49 KG/M2 | RESPIRATION RATE: 16 BRPM | HEART RATE: 95 BPM | TEMPERATURE: 98.7 F | SYSTOLIC BLOOD PRESSURE: 116 MMHG | HEIGHT: 65 IN | DIASTOLIC BLOOD PRESSURE: 73 MMHG | OXYGEN SATURATION: 99 %

## 2019-01-01 VITALS
RESPIRATION RATE: 18 BRPM | SYSTOLIC BLOOD PRESSURE: 93 MMHG | OXYGEN SATURATION: 98 % | DIASTOLIC BLOOD PRESSURE: 60 MMHG

## 2019-01-01 VITALS
BODY MASS INDEX: 22.51 KG/M2 | RESPIRATION RATE: 16 BRPM | DIASTOLIC BLOOD PRESSURE: 65 MMHG | HEART RATE: 94 BPM | SYSTOLIC BLOOD PRESSURE: 108 MMHG | HEART RATE: 123 BPM | OXYGEN SATURATION: 98 % | OXYGEN SATURATION: 99 % | RESPIRATION RATE: 16 BRPM | TEMPERATURE: 97 F | DIASTOLIC BLOOD PRESSURE: 45 MMHG | WEIGHT: 135.13 LBS | SYSTOLIC BLOOD PRESSURE: 68 MMHG | HEIGHT: 65 IN

## 2019-01-01 VITALS
WEIGHT: 138 LBS | HEART RATE: 87 BPM | BODY MASS INDEX: 22.96 KG/M2 | DIASTOLIC BLOOD PRESSURE: 65 MMHG | SYSTOLIC BLOOD PRESSURE: 122 MMHG | OXYGEN SATURATION: 97 % | TEMPERATURE: 97.8 F

## 2019-01-01 VITALS — DIASTOLIC BLOOD PRESSURE: 55 MMHG | OXYGEN SATURATION: 96 % | HEART RATE: 68 BPM | SYSTOLIC BLOOD PRESSURE: 78 MMHG

## 2019-01-01 VITALS
WEIGHT: 130 LBS | RESPIRATION RATE: 16 BRPM | DIASTOLIC BLOOD PRESSURE: 59 MMHG | OXYGEN SATURATION: 96 % | HEIGHT: 65 IN | HEART RATE: 102 BPM | SYSTOLIC BLOOD PRESSURE: 93 MMHG | TEMPERATURE: 96.5 F | BODY MASS INDEX: 21.66 KG/M2

## 2019-01-01 VITALS
RESPIRATION RATE: 16 BRPM | OXYGEN SATURATION: 100 % | TEMPERATURE: 98 F | DIASTOLIC BLOOD PRESSURE: 41 MMHG | WEIGHT: 110.8 LBS | HEART RATE: 116 BPM | SYSTOLIC BLOOD PRESSURE: 59 MMHG | BODY MASS INDEX: 18.44 KG/M2

## 2019-01-01 VITALS
BODY MASS INDEX: 23.1 KG/M2 | BODY MASS INDEX: 22.55 KG/M2 | SYSTOLIC BLOOD PRESSURE: 117 MMHG | WEIGHT: 135.5 LBS | HEART RATE: 86 BPM | RESPIRATION RATE: 16 BRPM | OXYGEN SATURATION: 98 % | TEMPERATURE: 98 F | TEMPERATURE: 98.3 F | DIASTOLIC BLOOD PRESSURE: 67 MMHG | OXYGEN SATURATION: 100 % | WEIGHT: 138.8 LBS | RESPIRATION RATE: 16 BRPM | HEART RATE: 82 BPM | DIASTOLIC BLOOD PRESSURE: 58 MMHG | SYSTOLIC BLOOD PRESSURE: 117 MMHG

## 2019-01-01 VITALS
OXYGEN SATURATION: 97 % | HEART RATE: 92 BPM | SYSTOLIC BLOOD PRESSURE: 76 MMHG | DIASTOLIC BLOOD PRESSURE: 42 MMHG | RESPIRATION RATE: 16 BRPM

## 2019-01-01 VITALS
TEMPERATURE: 98.1 F | SYSTOLIC BLOOD PRESSURE: 131 MMHG | RESPIRATION RATE: 18 BRPM | OXYGEN SATURATION: 96 % | BODY MASS INDEX: 23.82 KG/M2 | DIASTOLIC BLOOD PRESSURE: 85 MMHG | WEIGHT: 143 LBS | HEART RATE: 88 BPM | HEIGHT: 65 IN

## 2019-01-01 VITALS
HEIGHT: 65 IN | WEIGHT: 126.4 LBS | RESPIRATION RATE: 13 BRPM | SYSTOLIC BLOOD PRESSURE: 102 MMHG | BODY MASS INDEX: 21.06 KG/M2 | DIASTOLIC BLOOD PRESSURE: 61 MMHG | TEMPERATURE: 98.1 F | OXYGEN SATURATION: 100 % | HEART RATE: 123 BPM

## 2019-01-01 VITALS
TEMPERATURE: 97.4 F | DIASTOLIC BLOOD PRESSURE: 67 MMHG | RESPIRATION RATE: 18 BRPM | HEART RATE: 98 BPM | BODY MASS INDEX: 20.1 KG/M2 | SYSTOLIC BLOOD PRESSURE: 100 MMHG | OXYGEN SATURATION: 95 % | WEIGHT: 120.8 LBS

## 2019-01-01 VITALS
RESPIRATION RATE: 16 BRPM | HEART RATE: 113 BPM | OXYGEN SATURATION: 96 % | SYSTOLIC BLOOD PRESSURE: 100 MMHG | DIASTOLIC BLOOD PRESSURE: 40 MMHG

## 2019-01-01 VITALS — TEMPERATURE: 97.7 F | SYSTOLIC BLOOD PRESSURE: 118 MMHG | HEART RATE: 91 BPM | DIASTOLIC BLOOD PRESSURE: 72 MMHG

## 2019-01-01 VITALS
HEIGHT: 65 IN | SYSTOLIC BLOOD PRESSURE: 98 MMHG | RESPIRATION RATE: 16 BRPM | BODY MASS INDEX: 18.98 KG/M2 | TEMPERATURE: 97.4 F | DIASTOLIC BLOOD PRESSURE: 57 MMHG | WEIGHT: 113.9 LBS | HEART RATE: 95 BPM | OXYGEN SATURATION: 97 %

## 2019-01-01 VITALS
RESPIRATION RATE: 16 BRPM | OXYGEN SATURATION: 98 % | TEMPERATURE: 98.1 F | WEIGHT: 139.4 LBS | HEART RATE: 95 BPM | SYSTOLIC BLOOD PRESSURE: 128 MMHG | DIASTOLIC BLOOD PRESSURE: 72 MMHG | BODY MASS INDEX: 23.2 KG/M2

## 2019-01-01 VITALS
DIASTOLIC BLOOD PRESSURE: 74 MMHG | TEMPERATURE: 97.4 F | SYSTOLIC BLOOD PRESSURE: 105 MMHG | RESPIRATION RATE: 19 BRPM | WEIGHT: 132 LBS | HEART RATE: 109 BPM | HEIGHT: 65 IN | OXYGEN SATURATION: 98 % | BODY MASS INDEX: 21.99 KG/M2

## 2019-01-01 VITALS — DIASTOLIC BLOOD PRESSURE: 67 MMHG | HEART RATE: 101 BPM | OXYGEN SATURATION: 96 % | SYSTOLIC BLOOD PRESSURE: 88 MMHG

## 2019-01-01 VITALS
RESPIRATION RATE: 16 BRPM | BODY MASS INDEX: 23.31 KG/M2 | HEART RATE: 89 BPM | DIASTOLIC BLOOD PRESSURE: 65 MMHG | SYSTOLIC BLOOD PRESSURE: 102 MMHG | TEMPERATURE: 98.1 F | WEIGHT: 140.1 LBS | OXYGEN SATURATION: 98 %

## 2019-01-01 VITALS
HEART RATE: 89 BPM | WEIGHT: 141.3 LBS | BODY MASS INDEX: 23.51 KG/M2 | TEMPERATURE: 97.7 F | OXYGEN SATURATION: 98 % | DIASTOLIC BLOOD PRESSURE: 81 MMHG | SYSTOLIC BLOOD PRESSURE: 118 MMHG

## 2019-01-01 VITALS
RESPIRATION RATE: 18 BRPM | OXYGEN SATURATION: 97 % | SYSTOLIC BLOOD PRESSURE: 91 MMHG | TEMPERATURE: 97.8 F | HEIGHT: 65 IN | WEIGHT: 120.5 LBS | DIASTOLIC BLOOD PRESSURE: 62 MMHG | BODY MASS INDEX: 20.08 KG/M2 | HEART RATE: 105 BPM

## 2019-01-01 VITALS
TEMPERATURE: 99 F | HEART RATE: 78 BPM | OXYGEN SATURATION: 99 % | SYSTOLIC BLOOD PRESSURE: 124 MMHG | DIASTOLIC BLOOD PRESSURE: 73 MMHG | BODY MASS INDEX: 22.22 KG/M2 | RESPIRATION RATE: 16 BRPM | WEIGHT: 133.5 LBS

## 2019-01-01 VITALS
DIASTOLIC BLOOD PRESSURE: 74 MMHG | RESPIRATION RATE: 16 BRPM | OXYGEN SATURATION: 98 % | HEART RATE: 90 BPM | TEMPERATURE: 98.1 F | SYSTOLIC BLOOD PRESSURE: 112 MMHG

## 2019-01-01 VITALS — HEART RATE: 97 BPM | DIASTOLIC BLOOD PRESSURE: 38 MMHG | OXYGEN SATURATION: 97 % | SYSTOLIC BLOOD PRESSURE: 74 MMHG

## 2019-01-01 VITALS
HEART RATE: 106 BPM | DIASTOLIC BLOOD PRESSURE: 68 MMHG | OXYGEN SATURATION: 95 % | WEIGHT: 118.5 LBS | SYSTOLIC BLOOD PRESSURE: 101 MMHG | RESPIRATION RATE: 16 BRPM | TEMPERATURE: 99.5 F | BODY MASS INDEX: 19.74 KG/M2 | HEIGHT: 65 IN

## 2019-01-01 VITALS
WEIGHT: 117.73 LBS | OXYGEN SATURATION: 96 % | SYSTOLIC BLOOD PRESSURE: 106 MMHG | TEMPERATURE: 98.1 F | BODY MASS INDEX: 19.59 KG/M2 | RESPIRATION RATE: 20 BRPM | DIASTOLIC BLOOD PRESSURE: 54 MMHG | HEART RATE: 92 BPM

## 2019-01-01 VITALS
RESPIRATION RATE: 16 BRPM | OXYGEN SATURATION: 99 % | HEART RATE: 104 BPM | SYSTOLIC BLOOD PRESSURE: 91 MMHG | DIASTOLIC BLOOD PRESSURE: 52 MMHG

## 2019-01-01 VITALS
RESPIRATION RATE: 16 BRPM | SYSTOLIC BLOOD PRESSURE: 126 MMHG | BODY MASS INDEX: 23.3 KG/M2 | HEART RATE: 90 BPM | TEMPERATURE: 97.4 F | WEIGHT: 140 LBS | DIASTOLIC BLOOD PRESSURE: 82 MMHG | OXYGEN SATURATION: 98 %

## 2019-01-01 VITALS
BODY MASS INDEX: 20.21 KG/M2 | RESPIRATION RATE: 16 BRPM | WEIGHT: 121.3 LBS | HEIGHT: 65 IN | HEART RATE: 116 BPM | OXYGEN SATURATION: 97 % | SYSTOLIC BLOOD PRESSURE: 95 MMHG | DIASTOLIC BLOOD PRESSURE: 58 MMHG | TEMPERATURE: 98.4 F

## 2019-01-01 VITALS
OXYGEN SATURATION: 97 % | DIASTOLIC BLOOD PRESSURE: 62 MMHG | RESPIRATION RATE: 16 BRPM | SYSTOLIC BLOOD PRESSURE: 94 MMHG | HEART RATE: 60 BPM | TEMPERATURE: 98.2 F

## 2019-01-01 VITALS
HEART RATE: 75 BPM | BODY MASS INDEX: 19.32 KG/M2 | HEIGHT: 65 IN | TEMPERATURE: 97.5 F | SYSTOLIC BLOOD PRESSURE: 125 MMHG | OXYGEN SATURATION: 96 % | RESPIRATION RATE: 18 BRPM | WEIGHT: 115.96 LBS | DIASTOLIC BLOOD PRESSURE: 55 MMHG

## 2019-01-01 VITALS
HEART RATE: 90 BPM | RESPIRATION RATE: 16 BRPM | DIASTOLIC BLOOD PRESSURE: 73 MMHG | BODY MASS INDEX: 22.76 KG/M2 | OXYGEN SATURATION: 99 % | TEMPERATURE: 98.1 F | WEIGHT: 136.6 LBS | HEIGHT: 65 IN | SYSTOLIC BLOOD PRESSURE: 112 MMHG

## 2019-01-01 VITALS
RESPIRATION RATE: 16 BRPM | DIASTOLIC BLOOD PRESSURE: 75 MMHG | HEART RATE: 86 BPM | BODY MASS INDEX: 23.28 KG/M2 | WEIGHT: 139.9 LBS | SYSTOLIC BLOOD PRESSURE: 115 MMHG | OXYGEN SATURATION: 98 % | TEMPERATURE: 98 F

## 2019-01-01 VITALS
SYSTOLIC BLOOD PRESSURE: 105 MMHG | TEMPERATURE: 97.4 F | RESPIRATION RATE: 18 BRPM | WEIGHT: 120.4 LBS | HEART RATE: 90 BPM | DIASTOLIC BLOOD PRESSURE: 71 MMHG | OXYGEN SATURATION: 98 % | BODY MASS INDEX: 20.04 KG/M2

## 2019-01-01 VITALS
SYSTOLIC BLOOD PRESSURE: 106 MMHG | TEMPERATURE: 98.3 F | HEART RATE: 104 BPM | WEIGHT: 117.3 LBS | RESPIRATION RATE: 14 BRPM | BODY MASS INDEX: 19.54 KG/M2 | HEIGHT: 65 IN | DIASTOLIC BLOOD PRESSURE: 75 MMHG | OXYGEN SATURATION: 98 %

## 2019-01-01 VITALS
RESPIRATION RATE: 16 BRPM | DIASTOLIC BLOOD PRESSURE: 49 MMHG | HEART RATE: 108 BPM | OXYGEN SATURATION: 100 % | SYSTOLIC BLOOD PRESSURE: 88 MMHG

## 2019-01-01 VITALS
SYSTOLIC BLOOD PRESSURE: 113 MMHG | DIASTOLIC BLOOD PRESSURE: 75 MMHG | HEART RATE: 86 BPM | WEIGHT: 138.8 LBS | OXYGEN SATURATION: 97 % | TEMPERATURE: 98.3 F | BODY MASS INDEX: 23.1 KG/M2

## 2019-01-01 VITALS
DIASTOLIC BLOOD PRESSURE: 80 MMHG | RESPIRATION RATE: 16 BRPM | OXYGEN SATURATION: 99 % | SYSTOLIC BLOOD PRESSURE: 124 MMHG | HEART RATE: 80 BPM | TEMPERATURE: 97.4 F

## 2019-01-01 VITALS
HEART RATE: 89 BPM | DIASTOLIC BLOOD PRESSURE: 60 MMHG | OXYGEN SATURATION: 100 % | RESPIRATION RATE: 16 BRPM | SYSTOLIC BLOOD PRESSURE: 83 MMHG

## 2019-01-01 VITALS
TEMPERATURE: 98.6 F | SYSTOLIC BLOOD PRESSURE: 126 MMHG | HEART RATE: 87 BPM | RESPIRATION RATE: 16 BRPM | DIASTOLIC BLOOD PRESSURE: 78 MMHG | OXYGEN SATURATION: 98 %

## 2019-01-01 VITALS
RESPIRATION RATE: 16 BRPM | OXYGEN SATURATION: 99 % | HEART RATE: 100 BPM | TEMPERATURE: 96.9 F | WEIGHT: 126.25 LBS | BODY MASS INDEX: 21.04 KG/M2 | SYSTOLIC BLOOD PRESSURE: 103 MMHG | HEIGHT: 65 IN | DIASTOLIC BLOOD PRESSURE: 64 MMHG

## 2019-01-01 VITALS
HEIGHT: 65 IN | HEART RATE: 91 BPM | WEIGHT: 118 LBS | BODY MASS INDEX: 19.66 KG/M2 | SYSTOLIC BLOOD PRESSURE: 90 MMHG | DIASTOLIC BLOOD PRESSURE: 62 MMHG

## 2019-01-01 VITALS
BODY MASS INDEX: 22.07 KG/M2 | HEART RATE: 108 BPM | TEMPERATURE: 97.5 F | DIASTOLIC BLOOD PRESSURE: 45 MMHG | SYSTOLIC BLOOD PRESSURE: 92 MMHG | WEIGHT: 132.6 LBS | OXYGEN SATURATION: 97 % | RESPIRATION RATE: 16 BRPM

## 2019-01-01 VITALS
SYSTOLIC BLOOD PRESSURE: 88 MMHG | HEIGHT: 65 IN | HEART RATE: 103 BPM | BODY MASS INDEX: 20.16 KG/M2 | RESPIRATION RATE: 18 BRPM | OXYGEN SATURATION: 96 % | DIASTOLIC BLOOD PRESSURE: 49 MMHG | WEIGHT: 121 LBS

## 2019-01-01 VITALS
RESPIRATION RATE: 16 BRPM | SYSTOLIC BLOOD PRESSURE: 105 MMHG | TEMPERATURE: 98.7 F | HEART RATE: 91 BPM | DIASTOLIC BLOOD PRESSURE: 73 MMHG | OXYGEN SATURATION: 98 %

## 2019-01-01 VITALS — DIASTOLIC BLOOD PRESSURE: 54 MMHG | SYSTOLIC BLOOD PRESSURE: 79 MMHG | OXYGEN SATURATION: 96 %

## 2019-01-01 VITALS
HEART RATE: 64 BPM | WEIGHT: 142.5 LBS | RESPIRATION RATE: 16 BRPM | DIASTOLIC BLOOD PRESSURE: 55 MMHG | TEMPERATURE: 98.7 F | BODY MASS INDEX: 23.71 KG/M2 | OXYGEN SATURATION: 97 % | SYSTOLIC BLOOD PRESSURE: 100 MMHG

## 2019-01-01 VITALS
BODY MASS INDEX: 22.61 KG/M2 | WEIGHT: 135.9 LBS | RESPIRATION RATE: 16 BRPM | OXYGEN SATURATION: 99 % | HEART RATE: 96 BPM | SYSTOLIC BLOOD PRESSURE: 124 MMHG | DIASTOLIC BLOOD PRESSURE: 77 MMHG | TEMPERATURE: 98.9 F

## 2019-01-01 VITALS
HEART RATE: 92 BPM | RESPIRATION RATE: 16 BRPM | DIASTOLIC BLOOD PRESSURE: 58 MMHG | SYSTOLIC BLOOD PRESSURE: 106 MMHG | OXYGEN SATURATION: 95 %

## 2019-01-01 VITALS — HEART RATE: 96 BPM | DIASTOLIC BLOOD PRESSURE: 61 MMHG | OXYGEN SATURATION: 98 % | SYSTOLIC BLOOD PRESSURE: 82 MMHG

## 2019-01-01 VITALS
HEART RATE: 99 BPM | OXYGEN SATURATION: 94 % | TEMPERATURE: 97.8 F | SYSTOLIC BLOOD PRESSURE: 83 MMHG | RESPIRATION RATE: 20 BRPM | DIASTOLIC BLOOD PRESSURE: 55 MMHG

## 2019-01-01 VITALS
WEIGHT: 136.6 LBS | HEIGHT: 65 IN | DIASTOLIC BLOOD PRESSURE: 67 MMHG | BODY MASS INDEX: 22.76 KG/M2 | HEART RATE: 79 BPM | TEMPERATURE: 97.7 F | RESPIRATION RATE: 16 BRPM | SYSTOLIC BLOOD PRESSURE: 108 MMHG | OXYGEN SATURATION: 98 %

## 2019-01-01 VITALS
SYSTOLIC BLOOD PRESSURE: 94 MMHG | WEIGHT: 122.9 LBS | HEART RATE: 109 BPM | OXYGEN SATURATION: 94 % | DIASTOLIC BLOOD PRESSURE: 44 MMHG | BODY MASS INDEX: 20.45 KG/M2 | RESPIRATION RATE: 16 BRPM | TEMPERATURE: 98.1 F

## 2019-01-01 VITALS — HEART RATE: 109 BPM | SYSTOLIC BLOOD PRESSURE: 80 MMHG | OXYGEN SATURATION: 100 % | DIASTOLIC BLOOD PRESSURE: 47 MMHG

## 2019-01-01 VITALS — HEART RATE: 107 BPM | OXYGEN SATURATION: 95 % | SYSTOLIC BLOOD PRESSURE: 70 MMHG | DIASTOLIC BLOOD PRESSURE: 49 MMHG

## 2019-01-01 VITALS
SYSTOLIC BLOOD PRESSURE: 92 MMHG | DIASTOLIC BLOOD PRESSURE: 44 MMHG | OXYGEN SATURATION: 98 % | HEART RATE: 105 BPM | RESPIRATION RATE: 16 BRPM

## 2019-01-01 DIAGNOSIS — E83.42 HYPOMAGNESEMIA: ICD-10-CM

## 2019-01-01 DIAGNOSIS — C79.31 METASTASIS TO BRAIN (H): Primary | ICD-10-CM

## 2019-01-01 DIAGNOSIS — E83.42 HYPOMAGNESEMIA: Primary | ICD-10-CM

## 2019-01-01 DIAGNOSIS — C56.2 OVARIAN CANCER, LEFT (H): ICD-10-CM

## 2019-01-01 DIAGNOSIS — Z79.899 ENCOUNTER FOR LONG-TERM (CURRENT) USE OF MEDICATIONS: Primary | ICD-10-CM

## 2019-01-01 DIAGNOSIS — C56.1 OVARIAN CANCER, RIGHT (H): ICD-10-CM

## 2019-01-01 DIAGNOSIS — R22.1 NECK MASS: ICD-10-CM

## 2019-01-01 DIAGNOSIS — Z79.899 ENCOUNTER FOR LONG-TERM (CURRENT) USE OF MEDICATIONS: ICD-10-CM

## 2019-01-01 DIAGNOSIS — C56.1 OVARIAN CANCER, RIGHT (H): Primary | ICD-10-CM

## 2019-01-01 DIAGNOSIS — D70.2 DRUG-INDUCED NEUTROPENIA (H): ICD-10-CM

## 2019-01-01 DIAGNOSIS — R17 ELEVATED BILIRUBIN: Primary | ICD-10-CM

## 2019-01-01 DIAGNOSIS — C79.9 METASTATIC CANCER (H): Primary | ICD-10-CM

## 2019-01-01 DIAGNOSIS — C56.9 MALIGNANT NEOPLASM OF OVARY, UNSPECIFIED LATERALITY (H): Primary | ICD-10-CM

## 2019-01-01 DIAGNOSIS — C79.9 METASTATIC CANCER (H): ICD-10-CM

## 2019-01-01 DIAGNOSIS — R17 ELEVATED BILIRUBIN: ICD-10-CM

## 2019-01-01 DIAGNOSIS — D70.2 DRUG-INDUCED NEUTROPENIA (H): Primary | ICD-10-CM

## 2019-01-01 DIAGNOSIS — C56.9 MALIGNANT NEOPLASM OF OVARY, UNSPECIFIED LATERALITY (H): ICD-10-CM

## 2019-01-01 DIAGNOSIS — J90 PLEURAL EFFUSION: ICD-10-CM

## 2019-01-01 DIAGNOSIS — K83.09 CHOLANGITIS (H): Primary | ICD-10-CM

## 2019-01-01 DIAGNOSIS — G89.3 NEOPLASM RELATED PAIN: ICD-10-CM

## 2019-01-01 DIAGNOSIS — N13.5 STRICTURE OR KINKING OF URETER: ICD-10-CM

## 2019-01-01 DIAGNOSIS — N13.5 STRICTURE OR KINKING OF URETER: Primary | ICD-10-CM

## 2019-01-01 DIAGNOSIS — R30.0 DYSURIA: ICD-10-CM

## 2019-01-01 DIAGNOSIS — E87.6 HYPOKALEMIA: ICD-10-CM

## 2019-01-01 DIAGNOSIS — J90 RECURRENT RIGHT PLEURAL EFFUSION: ICD-10-CM

## 2019-01-01 DIAGNOSIS — K83.09 CHOLANGITIS (H): ICD-10-CM

## 2019-01-01 DIAGNOSIS — D72.829 LEUKOCYTOSIS, UNSPECIFIED TYPE: ICD-10-CM

## 2019-01-01 DIAGNOSIS — C78.7 LIVER METASTASIS: ICD-10-CM

## 2019-01-01 DIAGNOSIS — C56.9 OVARIAN CANCER, UNSPECIFIED LATERALITY (H): ICD-10-CM

## 2019-01-01 DIAGNOSIS — K83.1 BILIARY STRICTURE (H): Primary | ICD-10-CM

## 2019-01-01 DIAGNOSIS — K83.1 BILIARY OBSTRUCTION (H): Primary | ICD-10-CM

## 2019-01-01 DIAGNOSIS — B96.1 BACTEREMIA DUE TO KLEBSIELLA PNEUMONIAE: Primary | ICD-10-CM

## 2019-01-01 DIAGNOSIS — I95.9 HYPOTENSION, UNSPECIFIED HYPOTENSION TYPE: Primary | ICD-10-CM

## 2019-01-01 DIAGNOSIS — R11.0 NAUSEA: ICD-10-CM

## 2019-01-01 DIAGNOSIS — E87.6 HYPOPOTASSEMIA: ICD-10-CM

## 2019-01-01 DIAGNOSIS — G89.3 NEOPLASM RELATED PAIN (ACUTE) (CHRONIC): ICD-10-CM

## 2019-01-01 DIAGNOSIS — C79.31 BRAIN METASTASIS: ICD-10-CM

## 2019-01-01 DIAGNOSIS — N13.30 HYDRONEPHROSIS: Primary | ICD-10-CM

## 2019-01-01 DIAGNOSIS — J90 PLEURAL EFFUSION: Primary | ICD-10-CM

## 2019-01-01 DIAGNOSIS — R10.11 RUQ ABDOMINAL PAIN: ICD-10-CM

## 2019-01-01 DIAGNOSIS — R63.0 POOR APPETITE: ICD-10-CM

## 2019-01-01 DIAGNOSIS — Z98.890 STATUS POST ENDOSCOPIC RETROGRADE CHOLANGIOPANCREATOGRAPHY: Primary | ICD-10-CM

## 2019-01-01 DIAGNOSIS — R10.11 RUQ ABDOMINAL PAIN: Primary | ICD-10-CM

## 2019-01-01 DIAGNOSIS — R17 JAUNDICE: ICD-10-CM

## 2019-01-01 DIAGNOSIS — E86.0 DEHYDRATION: ICD-10-CM

## 2019-01-01 DIAGNOSIS — R79.89 ELEVATED TROPONIN: ICD-10-CM

## 2019-01-01 DIAGNOSIS — R10.84 ABDOMINAL PAIN, GENERALIZED: ICD-10-CM

## 2019-01-01 DIAGNOSIS — N13.30 HYDRONEPHROSIS OF RIGHT KIDNEY: Primary | ICD-10-CM

## 2019-01-01 DIAGNOSIS — N39.0 URINARY TRACT INFECTION: ICD-10-CM

## 2019-01-01 DIAGNOSIS — C79.31 METASTASIS TO BRAIN (H): ICD-10-CM

## 2019-01-01 DIAGNOSIS — K80.20 CHOLELITHIASES: Primary | ICD-10-CM

## 2019-01-01 DIAGNOSIS — R78.81 BACTEREMIA DUE TO KLEBSIELLA PNEUMONIAE: Primary | ICD-10-CM

## 2019-01-01 DIAGNOSIS — E46 MALNUTRITION, UNSPECIFIED TYPE (H): Primary | ICD-10-CM

## 2019-01-01 DIAGNOSIS — E87.1 HYPONATREMIA: ICD-10-CM

## 2019-01-01 DIAGNOSIS — B37.0 THRUSH: ICD-10-CM

## 2019-01-01 DIAGNOSIS — N20.0 RENAL STONES: ICD-10-CM

## 2019-01-01 DIAGNOSIS — R74.8 ELEVATED LIVER ENZYMES: Primary | ICD-10-CM

## 2019-01-01 DIAGNOSIS — R10.13 DYSPEPSIA: ICD-10-CM

## 2019-01-01 DIAGNOSIS — N13.30 HYDRONEPHROSIS OF RIGHT KIDNEY: ICD-10-CM

## 2019-01-01 DIAGNOSIS — R74.8 ELEVATED LIVER ENZYMES: ICD-10-CM

## 2019-01-01 DIAGNOSIS — G89.3 CHRONIC PAIN DUE TO NEOPLASM: ICD-10-CM

## 2019-01-01 DIAGNOSIS — C56.2 OVARIAN CANCER, LEFT (H): Primary | ICD-10-CM

## 2019-01-01 DIAGNOSIS — C56.3 MALIGNANT NEOPLASM OF BOTH OVARIES (H): ICD-10-CM

## 2019-01-01 DIAGNOSIS — C79.89 SECONDARY MALIGNANT NEOPLASM OF OTHER SPECIFIED SITES (H): ICD-10-CM

## 2019-01-01 DIAGNOSIS — C79.31 BRAIN METASTASIS: Primary | ICD-10-CM

## 2019-01-01 DIAGNOSIS — R06.2 WHEEZING: ICD-10-CM

## 2019-01-01 DIAGNOSIS — N20.0 RENAL STONES: Primary | ICD-10-CM

## 2019-01-01 DIAGNOSIS — K83.1 BILIARY STRICTURE (H): ICD-10-CM

## 2019-01-01 DIAGNOSIS — K81.9 CHOLECYSTITIS: ICD-10-CM

## 2019-01-01 DIAGNOSIS — K59.00 CONSTIPATION, UNSPECIFIED CONSTIPATION TYPE: ICD-10-CM

## 2019-01-01 DIAGNOSIS — R19.7 DIARRHEA, UNSPECIFIED TYPE: ICD-10-CM

## 2019-01-01 DIAGNOSIS — R64 MALIGNANT CACHEXIA (H): ICD-10-CM

## 2019-01-01 DIAGNOSIS — D49.6 BRAIN TUMOR (H): Primary | ICD-10-CM

## 2019-01-01 DIAGNOSIS — N30.00 ACUTE CYSTITIS WITHOUT HEMATURIA: Primary | ICD-10-CM

## 2019-01-01 DIAGNOSIS — Z51.11 ENCOUNTER FOR ANTINEOPLASTIC CHEMOTHERAPY: ICD-10-CM

## 2019-01-01 DIAGNOSIS — R62.7 FAILURE TO THRIVE IN ADULT: ICD-10-CM

## 2019-01-01 DIAGNOSIS — K81.1 CHRONIC CHOLECYSTITIS: Primary | ICD-10-CM

## 2019-01-01 DIAGNOSIS — N13.30 HYDRONEPHROSIS: ICD-10-CM

## 2019-01-01 DIAGNOSIS — C56.9 OVARIAN CANCER, UNSPECIFIED LATERALITY (H): Primary | ICD-10-CM

## 2019-01-01 LAB
ABO + RH BLD: NORMAL
ABO + RH BLD: NORMAL
ACANTHOCYTES BLD QL SMEAR: SLIGHT
ALBUMIN SERPL-MCNC: 0.8 G/DL (ref 3.4–5)
ALBUMIN SERPL-MCNC: 1.1 G/DL (ref 3.4–5)
ALBUMIN SERPL-MCNC: 1.1 G/DL (ref 3.4–5)
ALBUMIN SERPL-MCNC: 1.2 G/DL (ref 3.4–5)
ALBUMIN SERPL-MCNC: 1.2 G/DL (ref 3.4–5)
ALBUMIN SERPL-MCNC: 1.4 G/DL (ref 3.4–5)
ALBUMIN SERPL-MCNC: 1.6 G/DL (ref 3.4–5)
ALBUMIN SERPL-MCNC: 1.7 G/DL (ref 3.4–5)
ALBUMIN SERPL-MCNC: 2 G/DL (ref 3.4–5)
ALBUMIN SERPL-MCNC: 2.3 G/DL (ref 3.4–5)
ALBUMIN SERPL-MCNC: 2.3 G/DL (ref 3.4–5)
ALBUMIN SERPL-MCNC: 2.4 G/DL (ref 3.4–5)
ALBUMIN SERPL-MCNC: 2.5 G/DL (ref 3.4–5)
ALBUMIN SERPL-MCNC: 2.5 G/DL (ref 3.4–5)
ALBUMIN SERPL-MCNC: 2.7 G/DL (ref 3.4–5)
ALBUMIN SERPL-MCNC: 2.8 G/DL (ref 3.4–5)
ALBUMIN SERPL-MCNC: 3.2 G/DL (ref 3.4–5)
ALBUMIN SERPL-MCNC: 3.2 G/DL (ref 3.4–5)
ALBUMIN SERPL-MCNC: 3.3 G/DL (ref 3.4–5)
ALBUMIN SERPL-MCNC: 3.5 G/DL (ref 3.4–5)
ALBUMIN SERPL-MCNC: 3.5 G/DL (ref 3.4–5)
ALBUMIN SERPL-MCNC: 3.6 G/DL (ref 3.4–5)
ALBUMIN SERPL-MCNC: NORMAL G/DL (ref 3.4–5)
ALBUMIN UR-MCNC: 10 MG/DL
ALBUMIN UR-MCNC: 100 MG/DL
ALBUMIN UR-MCNC: 100 MG/DL
ALBUMIN UR-MCNC: 70 MG/DL
ALBUMIN UR-MCNC: 70 MG/DL
ALBUMIN UR-MCNC: NEGATIVE MG/DL
ALP SERPL-CCNC: 1065 U/L (ref 40–150)
ALP SERPL-CCNC: 1128 U/L (ref 40–150)
ALP SERPL-CCNC: 1184 U/L (ref 40–150)
ALP SERPL-CCNC: 1208 U/L (ref 40–150)
ALP SERPL-CCNC: 122 U/L (ref 40–150)
ALP SERPL-CCNC: 454 U/L (ref 40–150)
ALP SERPL-CCNC: 475 U/L (ref 40–150)
ALP SERPL-CCNC: 480 U/L (ref 40–150)
ALP SERPL-CCNC: 574 U/L (ref 40–150)
ALP SERPL-CCNC: 632 U/L (ref 40–150)
ALP SERPL-CCNC: 667 U/L (ref 40–150)
ALP SERPL-CCNC: 676 U/L (ref 40–150)
ALP SERPL-CCNC: 677 U/L (ref 40–150)
ALP SERPL-CCNC: 704 U/L (ref 40–150)
ALP SERPL-CCNC: 722 U/L (ref 40–150)
ALP SERPL-CCNC: 738 U/L (ref 40–150)
ALP SERPL-CCNC: 739 U/L (ref 40–150)
ALP SERPL-CCNC: 742 U/L (ref 40–150)
ALP SERPL-CCNC: 816 U/L (ref 40–150)
ALP SERPL-CCNC: 817 U/L (ref 40–150)
ALP SERPL-CCNC: 825 U/L (ref 40–150)
ALP SERPL-CCNC: 883 U/L (ref 40–150)
ALP SERPL-CCNC: 910 U/L (ref 40–150)
ALP SERPL-CCNC: 955 U/L (ref 40–150)
ALP SERPL-CCNC: 965 U/L (ref 40–150)
ALP SERPL-CCNC: 970 U/L (ref 40–150)
ALP SERPL-CCNC: 983 U/L (ref 40–150)
ALP SERPL-CCNC: 990 U/L (ref 40–150)
ALP SERPL-CCNC: 997 U/L (ref 40–150)
ALP SERPL-CCNC: NORMAL U/L (ref 40–150)
ALT SERPL W P-5'-P-CCNC: 102 U/L (ref 0–50)
ALT SERPL W P-5'-P-CCNC: 106 U/L (ref 0–50)
ALT SERPL W P-5'-P-CCNC: 23 U/L (ref 0–50)
ALT SERPL W P-5'-P-CCNC: 24 U/L (ref 0–50)
ALT SERPL W P-5'-P-CCNC: 257 U/L (ref 0–50)
ALT SERPL W P-5'-P-CCNC: 26 U/L (ref 0–50)
ALT SERPL W P-5'-P-CCNC: 30 U/L (ref 0–50)
ALT SERPL W P-5'-P-CCNC: 39 U/L (ref 0–50)
ALT SERPL W P-5'-P-CCNC: 42 U/L (ref 0–50)
ALT SERPL W P-5'-P-CCNC: 44 U/L (ref 0–50)
ALT SERPL W P-5'-P-CCNC: 47 U/L (ref 0–50)
ALT SERPL W P-5'-P-CCNC: 51 U/L (ref 0–50)
ALT SERPL W P-5'-P-CCNC: 55 U/L (ref 0–50)
ALT SERPL W P-5'-P-CCNC: 58 U/L (ref 0–50)
ALT SERPL W P-5'-P-CCNC: 59 U/L (ref 0–50)
ALT SERPL W P-5'-P-CCNC: 60 U/L (ref 0–50)
ALT SERPL W P-5'-P-CCNC: 62 U/L (ref 0–50)
ALT SERPL W P-5'-P-CCNC: 63 U/L (ref 0–50)
ALT SERPL W P-5'-P-CCNC: 67 U/L (ref 0–50)
ALT SERPL W P-5'-P-CCNC: 70 U/L (ref 0–50)
ALT SERPL W P-5'-P-CCNC: 76 U/L (ref 0–50)
ALT SERPL W P-5'-P-CCNC: 80 U/L (ref 0–50)
ALT SERPL W P-5'-P-CCNC: 82 U/L (ref 0–50)
ALT SERPL W P-5'-P-CCNC: 84 U/L (ref 0–50)
ALT SERPL W P-5'-P-CCNC: 90 U/L (ref 0–50)
ALT SERPL W P-5'-P-CCNC: 90 U/L (ref 0–50)
ALT SERPL W P-5'-P-CCNC: 93 U/L (ref 0–50)
ALT SERPL W P-5'-P-CCNC: NORMAL U/L (ref 0–50)
AMORPH CRY #/AREA URNS HPF: ABNORMAL /HPF
AMYLASE SERPL-CCNC: 22 U/L (ref 30–110)
ANION GAP SERPL CALCULATED.3IONS-SCNC: 11 MMOL/L (ref 3–14)
ANION GAP SERPL CALCULATED.3IONS-SCNC: 13 MMOL/L (ref 3–14)
ANION GAP SERPL CALCULATED.3IONS-SCNC: 4 MMOL/L (ref 3–14)
ANION GAP SERPL CALCULATED.3IONS-SCNC: 5 MMOL/L (ref 3–14)
ANION GAP SERPL CALCULATED.3IONS-SCNC: 6 MMOL/L (ref 3–14)
ANION GAP SERPL CALCULATED.3IONS-SCNC: 7 MMOL/L (ref 3–14)
ANION GAP SERPL CALCULATED.3IONS-SCNC: 8 MMOL/L (ref 3–14)
ANION GAP SERPL CALCULATED.3IONS-SCNC: 9 MMOL/L (ref 3–14)
ANION GAP SERPL CALCULATED.3IONS-SCNC: NORMAL MMOL/L (ref 6–17)
ANISOCYTOSIS BLD QL SMEAR: ABNORMAL
ANISOCYTOSIS BLD QL SMEAR: SLIGHT
ANISOCYTOSIS BLD QL SMEAR: SLIGHT
APPEARANCE FLD: NORMAL
APPEARANCE FLD: NORMAL
APPEARANCE UR: ABNORMAL
APPEARANCE UR: CLEAR
APTT PPP: 21 SEC (ref 22–37)
APTT PPP: 33 SEC (ref 22–37)
APTT PPP: 34 SEC (ref 22–37)
APTT PPP: 35 SEC (ref 22–37)
AST SERPL W P-5'-P-CCNC: 107 U/L (ref 0–45)
AST SERPL W P-5'-P-CCNC: 175 U/L (ref 0–45)
AST SERPL W P-5'-P-CCNC: 19 U/L (ref 0–45)
AST SERPL W P-5'-P-CCNC: 24 U/L (ref 0–45)
AST SERPL W P-5'-P-CCNC: 25 U/L (ref 0–45)
AST SERPL W P-5'-P-CCNC: 25 U/L (ref 0–45)
AST SERPL W P-5'-P-CCNC: 29 U/L (ref 0–45)
AST SERPL W P-5'-P-CCNC: 32 U/L (ref 0–45)
AST SERPL W P-5'-P-CCNC: 33 U/L (ref 0–45)
AST SERPL W P-5'-P-CCNC: 36 U/L (ref 0–45)
AST SERPL W P-5'-P-CCNC: 38 U/L (ref 0–45)
AST SERPL W P-5'-P-CCNC: 39 U/L (ref 0–45)
AST SERPL W P-5'-P-CCNC: 39 U/L (ref 0–45)
AST SERPL W P-5'-P-CCNC: 40 U/L (ref 0–45)
AST SERPL W P-5'-P-CCNC: 44 U/L (ref 0–45)
AST SERPL W P-5'-P-CCNC: 47 U/L (ref 0–45)
AST SERPL W P-5'-P-CCNC: 47 U/L (ref 0–45)
AST SERPL W P-5'-P-CCNC: 50 U/L (ref 0–45)
AST SERPL W P-5'-P-CCNC: 50 U/L (ref 0–45)
AST SERPL W P-5'-P-CCNC: 51 U/L (ref 0–45)
AST SERPL W P-5'-P-CCNC: 57 U/L (ref 0–45)
AST SERPL W P-5'-P-CCNC: 61 U/L (ref 0–45)
AST SERPL W P-5'-P-CCNC: 63 U/L (ref 0–45)
AST SERPL W P-5'-P-CCNC: 67 U/L (ref 0–45)
AST SERPL W P-5'-P-CCNC: 68 U/L (ref 0–45)
AST SERPL W P-5'-P-CCNC: 78 U/L (ref 0–45)
AST SERPL W P-5'-P-CCNC: 85 U/L (ref 0–45)
AST SERPL W P-5'-P-CCNC: NORMAL U/L (ref 0–45)
BACTERIA SPEC CULT: ABNORMAL
BACTERIA SPEC CULT: NO GROWTH
BACTERIA SPEC CULT: NORMAL
BASOPHILS # BLD AUTO: 0 10E9/L (ref 0–0.2)
BASOPHILS # BLD AUTO: 0.1 10E9/L (ref 0–0.2)
BASOPHILS NFR BLD AUTO: 0 %
BASOPHILS NFR BLD AUTO: 0.2 %
BASOPHILS NFR BLD AUTO: 0.3 %
BASOPHILS NFR BLD AUTO: 0.3 %
BASOPHILS NFR BLD AUTO: 0.4 %
BASOPHILS NFR BLD AUTO: 0.5 %
BASOPHILS NFR BLD AUTO: 0.6 %
BASOPHILS NFR BLD AUTO: 0.6 %
BASOPHILS NFR BLD AUTO: 0.7 %
BASOPHILS NFR BLD AUTO: 0.8 %
BASOPHILS NFR BLD AUTO: 0.9 %
BASOPHILS NFR BLD AUTO: 1 %
BASOPHILS NFR BLD AUTO: 1.3 %
BILIRUB DIRECT SERPL-MCNC: 1.4 MG/DL (ref 0–0.2)
BILIRUB SERPL-MCNC: 0.4 MG/DL (ref 0.2–1.3)
BILIRUB SERPL-MCNC: 0.5 MG/DL (ref 0.2–1.3)
BILIRUB SERPL-MCNC: 0.6 MG/DL (ref 0.2–1.3)
BILIRUB SERPL-MCNC: 0.7 MG/DL (ref 0.2–1.3)
BILIRUB SERPL-MCNC: 0.8 MG/DL (ref 0.2–1.3)
BILIRUB SERPL-MCNC: 0.8 MG/DL (ref 0.2–1.3)
BILIRUB SERPL-MCNC: 1 MG/DL (ref 0.2–1.3)
BILIRUB SERPL-MCNC: 1.1 MG/DL (ref 0.2–1.3)
BILIRUB SERPL-MCNC: 1.2 MG/DL (ref 0.2–1.3)
BILIRUB SERPL-MCNC: 1.3 MG/DL (ref 0.2–1.3)
BILIRUB SERPL-MCNC: 1.5 MG/DL (ref 0.2–1.3)
BILIRUB SERPL-MCNC: 1.9 MG/DL (ref 0.2–1.3)
BILIRUB SERPL-MCNC: 10.5 MG/DL (ref 0.2–1.3)
BILIRUB SERPL-MCNC: 10.6 MG/DL (ref 0.2–1.3)
BILIRUB SERPL-MCNC: 12.7 MG/DL (ref 0.2–1.3)
BILIRUB SERPL-MCNC: 2 MG/DL (ref 0.2–1.3)
BILIRUB SERPL-MCNC: 2 MG/DL (ref 0.2–1.3)
BILIRUB SERPL-MCNC: 2.1 MG/DL (ref 0.2–1.3)
BILIRUB SERPL-MCNC: 3.4 MG/DL (ref 0.2–1.3)
BILIRUB SERPL-MCNC: 6.3 MG/DL (ref 0.2–1.3)
BILIRUB SERPL-MCNC: NORMAL MG/DL (ref 0.2–1.3)
BILIRUB UR QL STRIP: ABNORMAL
BILIRUB UR QL STRIP: NEGATIVE
BLD GP AB SCN SERPL QL: NORMAL
BLOOD BANK CMNT PATIENT-IMP: NORMAL
BUN SERPL-MCNC: 10 MG/DL (ref 7–30)
BUN SERPL-MCNC: 11 MG/DL (ref 7–30)
BUN SERPL-MCNC: 11 MG/DL (ref 7–30)
BUN SERPL-MCNC: 12 MG/DL (ref 7–30)
BUN SERPL-MCNC: 12 MG/DL (ref 7–30)
BUN SERPL-MCNC: 13 MG/DL (ref 7–30)
BUN SERPL-MCNC: 14 MG/DL (ref 7–30)
BUN SERPL-MCNC: 17 MG/DL (ref 7–30)
BUN SERPL-MCNC: 18 MG/DL (ref 7–30)
BUN SERPL-MCNC: 20 MG/DL (ref 7–30)
BUN SERPL-MCNC: 4 MG/DL (ref 7–30)
BUN SERPL-MCNC: 4 MG/DL (ref 7–30)
BUN SERPL-MCNC: 5 MG/DL (ref 7–30)
BUN SERPL-MCNC: 6 MG/DL (ref 7–30)
BUN SERPL-MCNC: 7 MG/DL (ref 7–30)
BUN SERPL-MCNC: 8 MG/DL (ref 7–30)
BUN SERPL-MCNC: 9 MG/DL (ref 7–30)
BUN SERPL-MCNC: NORMAL MG/DL (ref 7–30)
C DIFF TOX B STL QL: NEGATIVE
CALCIUM SERPL-MCNC: 7.6 MG/DL (ref 8.5–10.1)
CALCIUM SERPL-MCNC: 7.7 MG/DL (ref 8.5–10.1)
CALCIUM SERPL-MCNC: 7.8 MG/DL (ref 8.5–10.1)
CALCIUM SERPL-MCNC: 7.9 MG/DL (ref 8.5–10.1)
CALCIUM SERPL-MCNC: 8 MG/DL (ref 8.5–10.1)
CALCIUM SERPL-MCNC: 8 MG/DL (ref 8.5–10.1)
CALCIUM SERPL-MCNC: 8.1 MG/DL (ref 8.5–10.1)
CALCIUM SERPL-MCNC: 8.2 MG/DL (ref 8.5–10.1)
CALCIUM SERPL-MCNC: 8.3 MG/DL (ref 8.5–10.1)
CALCIUM SERPL-MCNC: 8.4 MG/DL (ref 8.5–10.1)
CALCIUM SERPL-MCNC: 8.4 MG/DL (ref 8.5–10.1)
CALCIUM SERPL-MCNC: 8.5 MG/DL (ref 8.5–10.1)
CALCIUM SERPL-MCNC: 8.5 MG/DL (ref 8.5–10.1)
CALCIUM SERPL-MCNC: 8.6 MG/DL (ref 8.5–10.1)
CALCIUM SERPL-MCNC: 8.7 MG/DL (ref 8.5–10.1)
CALCIUM SERPL-MCNC: 8.8 MG/DL (ref 8.5–10.1)
CALCIUM SERPL-MCNC: 8.9 MG/DL (ref 8.5–10.1)
CALCIUM SERPL-MCNC: 9 MG/DL (ref 8.5–10.1)
CALCIUM SERPL-MCNC: 9 MG/DL (ref 8.5–10.1)
CALCIUM SERPL-MCNC: 9.1 MG/DL (ref 8.5–10.1)
CALCIUM SERPL-MCNC: 9.1 MG/DL (ref 8.5–10.1)
CALCIUM SERPL-MCNC: 9.2 MG/DL (ref 8.5–10.1)
CALCIUM SERPL-MCNC: 9.4 MG/DL (ref 8.5–10.1)
CALCIUM SERPL-MCNC: NORMAL MG/DL (ref 8.5–10.1)
CANCER AG125 SERPL-ACNC: 1398 U/ML (ref 0–30)
CANCER AG125 SERPL-ACNC: 142 U/ML (ref 0–30)
CANCER AG125 SERPL-ACNC: 147 U/ML (ref 0–30)
CANCER AG125 SERPL-ACNC: 180 U/ML (ref 0–30)
CANCER AG125 SERPL-ACNC: 1993 U/ML (ref 0–30)
CANCER AG125 SERPL-ACNC: 2244 U/ML (ref 0–30)
CANCER AG125 SERPL-ACNC: 259 U/ML (ref 0–30)
CANCER AG125 SERPL-ACNC: 3291 U/ML (ref 0–30)
CANCER AG125 SERPL-ACNC: 340 U/ML (ref 0–30)
CANCER AG125 SERPL-ACNC: 425 U/ML (ref 0–30)
CANCER AG125 SERPL-ACNC: 446 U/ML (ref 0–30)
CANCER AG125 SERPL-ACNC: 45 U/ML (ref 0–30)
CANCER AG125 SERPL-ACNC: 548 U/ML (ref 0–30)
CHLORIDE SERPL-SCNC: 101 MMOL/L (ref 94–109)
CHLORIDE SERPL-SCNC: 102 MMOL/L (ref 94–109)
CHLORIDE SERPL-SCNC: 103 MMOL/L (ref 94–109)
CHLORIDE SERPL-SCNC: 104 MMOL/L (ref 94–109)
CHLORIDE SERPL-SCNC: 105 MMOL/L (ref 94–109)
CHLORIDE SERPL-SCNC: 105 MMOL/L (ref 94–109)
CHLORIDE SERPL-SCNC: 106 MMOL/L (ref 94–109)
CHLORIDE SERPL-SCNC: 107 MMOL/L (ref 94–109)
CHLORIDE SERPL-SCNC: 107 MMOL/L (ref 94–109)
CHLORIDE SERPL-SCNC: 108 MMOL/L (ref 94–109)
CHLORIDE SERPL-SCNC: 92 MMOL/L (ref 94–109)
CHLORIDE SERPL-SCNC: 92 MMOL/L (ref 94–109)
CHLORIDE SERPL-SCNC: 94 MMOL/L (ref 94–109)
CHLORIDE SERPL-SCNC: 95 MMOL/L (ref 94–109)
CHLORIDE SERPL-SCNC: 96 MMOL/L (ref 94–109)
CHLORIDE SERPL-SCNC: 96 MMOL/L (ref 94–109)
CHLORIDE SERPL-SCNC: 97 MMOL/L (ref 94–109)
CHLORIDE SERPL-SCNC: 98 MMOL/L (ref 94–109)
CHLORIDE SERPL-SCNC: 99 MMOL/L (ref 94–109)
CHLORIDE SERPL-SCNC: NORMAL MMOL/L (ref 94–109)
CO2 BLDCOV-SCNC: 20 MMOL/L (ref 21–28)
CO2 SERPL-SCNC: 22 MMOL/L (ref 20–32)
CO2 SERPL-SCNC: 23 MMOL/L (ref 20–32)
CO2 SERPL-SCNC: 24 MMOL/L (ref 20–32)
CO2 SERPL-SCNC: 25 MMOL/L (ref 20–32)
CO2 SERPL-SCNC: 26 MMOL/L (ref 20–32)
CO2 SERPL-SCNC: 27 MMOL/L (ref 20–32)
CO2 SERPL-SCNC: 29 MMOL/L (ref 20–32)
CO2 SERPL-SCNC: NORMAL MMOL/L (ref 20–32)
COLOR FLD: YELLOW
COLOR FLD: YELLOW
COLOR UR AUTO: ABNORMAL
COLOR UR AUTO: YELLOW
COPATH REPORT: NORMAL
COPATH REPORT: NORMAL
CREAT SERPL-MCNC: 0.42 MG/DL (ref 0.52–1.04)
CREAT SERPL-MCNC: 0.46 MG/DL (ref 0.52–1.04)
CREAT SERPL-MCNC: 0.48 MG/DL (ref 0.52–1.04)
CREAT SERPL-MCNC: 0.49 MG/DL (ref 0.52–1.04)
CREAT SERPL-MCNC: 0.5 MG/DL (ref 0.52–1.04)
CREAT SERPL-MCNC: 0.5 MG/DL (ref 0.52–1.04)
CREAT SERPL-MCNC: 0.51 MG/DL (ref 0.52–1.04)
CREAT SERPL-MCNC: 0.52 MG/DL (ref 0.52–1.04)
CREAT SERPL-MCNC: 0.53 MG/DL (ref 0.52–1.04)
CREAT SERPL-MCNC: 0.54 MG/DL (ref 0.52–1.04)
CREAT SERPL-MCNC: 0.54 MG/DL (ref 0.52–1.04)
CREAT SERPL-MCNC: 0.55 MG/DL (ref 0.52–1.04)
CREAT SERPL-MCNC: 0.55 MG/DL (ref 0.52–1.04)
CREAT SERPL-MCNC: 0.56 MG/DL (ref 0.52–1.04)
CREAT SERPL-MCNC: 0.57 MG/DL (ref 0.52–1.04)
CREAT SERPL-MCNC: 0.58 MG/DL (ref 0.52–1.04)
CREAT SERPL-MCNC: 0.58 MG/DL (ref 0.52–1.04)
CREAT SERPL-MCNC: 0.6 MG/DL (ref 0.52–1.04)
CREAT SERPL-MCNC: 0.6 MG/DL (ref 0.52–1.04)
CREAT SERPL-MCNC: 0.61 MG/DL (ref 0.52–1.04)
CREAT SERPL-MCNC: 0.61 MG/DL (ref 0.52–1.04)
CREAT SERPL-MCNC: 0.62 MG/DL (ref 0.52–1.04)
CREAT SERPL-MCNC: 0.63 MG/DL (ref 0.52–1.04)
CREAT SERPL-MCNC: 0.65 MG/DL (ref 0.52–1.04)
CREAT SERPL-MCNC: 0.65 MG/DL (ref 0.52–1.04)
CREAT SERPL-MCNC: 0.66 MG/DL (ref 0.52–1.04)
CREAT SERPL-MCNC: 0.66 MG/DL (ref 0.52–1.04)
CREAT SERPL-MCNC: 0.71 MG/DL (ref 0.52–1.04)
CREAT SERPL-MCNC: 0.75 MG/DL (ref 0.52–1.04)
CREAT SERPL-MCNC: 0.75 MG/DL (ref 0.52–1.04)
CREAT SERPL-MCNC: 0.76 MG/DL (ref 0.52–1.04)
CREAT SERPL-MCNC: 0.79 MG/DL (ref 0.52–1.04)
CREAT SERPL-MCNC: NORMAL MG/DL (ref 0.52–1.04)
CRP SERPL-MCNC: 53.9 MG/L (ref 0–8)
CRP SERPL-MCNC: 70.3 MG/L (ref 0–8)
DIFFERENTIAL METHOD BLD: ABNORMAL
DIFFERENTIAL METHOD BLD: NORMAL
DIFFERENTIAL METHOD BLD: NORMAL
EOSINOPHIL # BLD AUTO: 0 10E9/L (ref 0–0.7)
EOSINOPHIL # BLD AUTO: 0.1 10E9/L (ref 0–0.7)
EOSINOPHIL # BLD AUTO: 0.2 10E9/L (ref 0–0.7)
EOSINOPHIL NFR BLD AUTO: 0 %
EOSINOPHIL NFR BLD AUTO: 0.7 %
EOSINOPHIL NFR BLD AUTO: 0.7 %
EOSINOPHIL NFR BLD AUTO: 0.9 %
EOSINOPHIL NFR BLD AUTO: 1 %
EOSINOPHIL NFR BLD AUTO: 1 %
EOSINOPHIL NFR BLD AUTO: 1.4 %
EOSINOPHIL NFR BLD AUTO: 1.5 %
EOSINOPHIL NFR BLD AUTO: 1.8 %
EOSINOPHIL NFR BLD AUTO: 2 %
EOSINOPHIL NFR BLD AUTO: 2 %
EOSINOPHIL NFR BLD AUTO: 2.2 %
EOSINOPHIL NFR BLD AUTO: 2.3 %
EOSINOPHIL NFR BLD AUTO: 2.4 %
EOSINOPHIL NFR BLD AUTO: 2.6 %
EOSINOPHIL NFR BLD AUTO: 2.7 %
EOSINOPHIL NFR BLD AUTO: 2.7 %
EOSINOPHIL NFR BLD AUTO: 3 %
EOSINOPHIL NFR BLD AUTO: 3.2 %
EOSINOPHIL NFR BLD AUTO: 3.2 %
EOSINOPHIL NFR BLD AUTO: 3.9 %
ERCP: NORMAL
ERCP: NORMAL
ERYTHROCYTE [DISTWIDTH] IN BLOOD BY AUTOMATED COUNT: 12.8 % (ref 10–15)
ERYTHROCYTE [DISTWIDTH] IN BLOOD BY AUTOMATED COUNT: 12.9 % (ref 10–15)
ERYTHROCYTE [DISTWIDTH] IN BLOOD BY AUTOMATED COUNT: 13.1 % (ref 10–15)
ERYTHROCYTE [DISTWIDTH] IN BLOOD BY AUTOMATED COUNT: 13.2 % (ref 10–15)
ERYTHROCYTE [DISTWIDTH] IN BLOOD BY AUTOMATED COUNT: 13.6 % (ref 10–15)
ERYTHROCYTE [DISTWIDTH] IN BLOOD BY AUTOMATED COUNT: 13.9 % (ref 10–15)
ERYTHROCYTE [DISTWIDTH] IN BLOOD BY AUTOMATED COUNT: 14.3 % (ref 10–15)
ERYTHROCYTE [DISTWIDTH] IN BLOOD BY AUTOMATED COUNT: 14.4 % (ref 10–15)
ERYTHROCYTE [DISTWIDTH] IN BLOOD BY AUTOMATED COUNT: 14.5 % (ref 10–15)
ERYTHROCYTE [DISTWIDTH] IN BLOOD BY AUTOMATED COUNT: 14.6 % (ref 10–15)
ERYTHROCYTE [DISTWIDTH] IN BLOOD BY AUTOMATED COUNT: 14.8 % (ref 10–15)
ERYTHROCYTE [DISTWIDTH] IN BLOOD BY AUTOMATED COUNT: 15.4 % (ref 10–15)
ERYTHROCYTE [DISTWIDTH] IN BLOOD BY AUTOMATED COUNT: 15.6 % (ref 10–15)
ERYTHROCYTE [DISTWIDTH] IN BLOOD BY AUTOMATED COUNT: 15.7 % (ref 10–15)
ERYTHROCYTE [DISTWIDTH] IN BLOOD BY AUTOMATED COUNT: 15.8 % (ref 10–15)
ERYTHROCYTE [DISTWIDTH] IN BLOOD BY AUTOMATED COUNT: 15.9 % (ref 10–15)
ERYTHROCYTE [DISTWIDTH] IN BLOOD BY AUTOMATED COUNT: 16 % (ref 10–15)
ERYTHROCYTE [DISTWIDTH] IN BLOOD BY AUTOMATED COUNT: 16.1 % (ref 10–15)
ERYTHROCYTE [DISTWIDTH] IN BLOOD BY AUTOMATED COUNT: 16.3 % (ref 10–15)
ERYTHROCYTE [DISTWIDTH] IN BLOOD BY AUTOMATED COUNT: 16.4 % (ref 10–15)
ERYTHROCYTE [DISTWIDTH] IN BLOOD BY AUTOMATED COUNT: 16.6 % (ref 10–15)
ERYTHROCYTE [DISTWIDTH] IN BLOOD BY AUTOMATED COUNT: 16.7 % (ref 10–15)
ERYTHROCYTE [DISTWIDTH] IN BLOOD BY AUTOMATED COUNT: 16.8 % (ref 10–15)
ERYTHROCYTE [DISTWIDTH] IN BLOOD BY AUTOMATED COUNT: 16.8 % (ref 10–15)
ERYTHROCYTE [DISTWIDTH] IN BLOOD BY AUTOMATED COUNT: 17.1 % (ref 10–15)
ERYTHROCYTE [DISTWIDTH] IN BLOOD BY AUTOMATED COUNT: 17.4 % (ref 10–15)
ERYTHROCYTE [DISTWIDTH] IN BLOOD BY AUTOMATED COUNT: 17.7 % (ref 10–15)
ERYTHROCYTE [DISTWIDTH] IN BLOOD BY AUTOMATED COUNT: 18.2 % (ref 10–15)
ERYTHROCYTE [DISTWIDTH] IN BLOOD BY AUTOMATED COUNT: 18.5 % (ref 10–15)
ERYTHROCYTE [DISTWIDTH] IN BLOOD BY AUTOMATED COUNT: 20.5 % (ref 10–15)
ERYTHROCYTE [SEDIMENTATION RATE] IN BLOOD BY WESTERGREN METHOD: 42 MM/H (ref 0–30)
ERYTHROCYTE [SEDIMENTATION RATE] IN BLOOD BY WESTERGREN METHOD: 73 MM/H (ref 0–30)
FIBRINOGEN PPP-MCNC: 349 MG/DL (ref 200–420)
FIBRINOGEN PPP-MCNC: 540 MG/DL (ref 200–420)
GFR SERPL CREATININE-BSD FRML MDRD: 81 ML/MIN/{1.73_M2}
GFR SERPL CREATININE-BSD FRML MDRD: 85 ML/MIN/{1.73_M2}
GFR SERPL CREATININE-BSD FRML MDRD: 86 ML/MIN/{1.73_M2}
GFR SERPL CREATININE-BSD FRML MDRD: 86 ML/MIN/{1.73_M2}
GFR SERPL CREATININE-BSD FRML MDRD: >90 ML/MIN/{1.73_M2}
GFR SERPL CREATININE-BSD FRML MDRD: NORMAL ML/MIN/{1.73_M2}
GLUCOSE BLDC GLUCOMTR-MCNC: 100 MG/DL (ref 70–99)
GLUCOSE BLDC GLUCOMTR-MCNC: 127 MG/DL (ref 70–99)
GLUCOSE BLDC GLUCOMTR-MCNC: 143 MG/DL (ref 70–99)
GLUCOSE BLDC GLUCOMTR-MCNC: 91 MG/DL (ref 70–99)
GLUCOSE BLDC GLUCOMTR-MCNC: 93 MG/DL (ref 70–99)
GLUCOSE BLDC GLUCOMTR-MCNC: 99 MG/DL (ref 70–99)
GLUCOSE SERPL-MCNC: 101 MG/DL (ref 70–99)
GLUCOSE SERPL-MCNC: 101 MG/DL (ref 70–99)
GLUCOSE SERPL-MCNC: 102 MG/DL (ref 70–99)
GLUCOSE SERPL-MCNC: 103 MG/DL (ref 70–99)
GLUCOSE SERPL-MCNC: 103 MG/DL (ref 70–99)
GLUCOSE SERPL-MCNC: 104 MG/DL (ref 70–99)
GLUCOSE SERPL-MCNC: 105 MG/DL (ref 70–99)
GLUCOSE SERPL-MCNC: 106 MG/DL (ref 70–99)
GLUCOSE SERPL-MCNC: 106 MG/DL (ref 70–99)
GLUCOSE SERPL-MCNC: 107 MG/DL (ref 70–99)
GLUCOSE SERPL-MCNC: 108 MG/DL (ref 70–99)
GLUCOSE SERPL-MCNC: 110 MG/DL (ref 70–99)
GLUCOSE SERPL-MCNC: 111 MG/DL (ref 70–99)
GLUCOSE SERPL-MCNC: 112 MG/DL (ref 70–99)
GLUCOSE SERPL-MCNC: 113 MG/DL (ref 70–99)
GLUCOSE SERPL-MCNC: 113 MG/DL (ref 70–99)
GLUCOSE SERPL-MCNC: 115 MG/DL (ref 70–99)
GLUCOSE SERPL-MCNC: 115 MG/DL (ref 70–99)
GLUCOSE SERPL-MCNC: 116 MG/DL (ref 70–99)
GLUCOSE SERPL-MCNC: 118 MG/DL (ref 70–99)
GLUCOSE SERPL-MCNC: 120 MG/DL (ref 70–99)
GLUCOSE SERPL-MCNC: 120 MG/DL (ref 70–99)
GLUCOSE SERPL-MCNC: 121 MG/DL (ref 70–99)
GLUCOSE SERPL-MCNC: 124 MG/DL (ref 70–99)
GLUCOSE SERPL-MCNC: 128 MG/DL (ref 70–99)
GLUCOSE SERPL-MCNC: 132 MG/DL (ref 70–99)
GLUCOSE SERPL-MCNC: 133 MG/DL (ref 70–99)
GLUCOSE SERPL-MCNC: 135 MG/DL (ref 70–99)
GLUCOSE SERPL-MCNC: 141 MG/DL (ref 70–99)
GLUCOSE SERPL-MCNC: 141 MG/DL (ref 70–99)
GLUCOSE SERPL-MCNC: 144 MG/DL (ref 70–99)
GLUCOSE SERPL-MCNC: 157 MG/DL (ref 70–99)
GLUCOSE SERPL-MCNC: 164 MG/DL (ref 70–99)
GLUCOSE SERPL-MCNC: 165 MG/DL (ref 70–99)
GLUCOSE SERPL-MCNC: 171 MG/DL (ref 70–99)
GLUCOSE SERPL-MCNC: 182 MG/DL (ref 70–99)
GLUCOSE SERPL-MCNC: 93 MG/DL (ref 70–99)
GLUCOSE SERPL-MCNC: 94 MG/DL (ref 70–99)
GLUCOSE SERPL-MCNC: 96 MG/DL (ref 70–99)
GLUCOSE SERPL-MCNC: 96 MG/DL (ref 70–99)
GLUCOSE SERPL-MCNC: 97 MG/DL (ref 70–99)
GLUCOSE SERPL-MCNC: NORMAL MG/DL (ref 70–99)
GLUCOSE UR STRIP-MCNC: NEGATIVE MG/DL
GRAM STN SPEC: NORMAL
HCT VFR BLD AUTO: 27.9 % (ref 35–47)
HCT VFR BLD AUTO: 28.8 % (ref 35–47)
HCT VFR BLD AUTO: 28.9 % (ref 35–47)
HCT VFR BLD AUTO: 29 % (ref 35–47)
HCT VFR BLD AUTO: 29.3 % (ref 35–47)
HCT VFR BLD AUTO: 29.4 % (ref 35–47)
HCT VFR BLD AUTO: 29.4 % (ref 35–47)
HCT VFR BLD AUTO: 29.5 % (ref 35–47)
HCT VFR BLD AUTO: 29.9 % (ref 35–47)
HCT VFR BLD AUTO: 30.5 % (ref 35–47)
HCT VFR BLD AUTO: 31.4 % (ref 35–47)
HCT VFR BLD AUTO: 31.6 % (ref 35–47)
HCT VFR BLD AUTO: 31.9 % (ref 35–47)
HCT VFR BLD AUTO: 32 % (ref 35–47)
HCT VFR BLD AUTO: 32.3 % (ref 35–47)
HCT VFR BLD AUTO: 32.9 % (ref 35–47)
HCT VFR BLD AUTO: 33 % (ref 35–47)
HCT VFR BLD AUTO: 33.1 % (ref 35–47)
HCT VFR BLD AUTO: 33.2 % (ref 35–47)
HCT VFR BLD AUTO: 33.2 % (ref 35–47)
HCT VFR BLD AUTO: 33.3 % (ref 35–47)
HCT VFR BLD AUTO: 33.7 % (ref 35–47)
HCT VFR BLD AUTO: 33.7 % (ref 35–47)
HCT VFR BLD AUTO: 33.8 % (ref 35–47)
HCT VFR BLD AUTO: 34.1 % (ref 35–47)
HCT VFR BLD AUTO: 34.3 % (ref 35–47)
HCT VFR BLD AUTO: 34.4 % (ref 35–47)
HCT VFR BLD AUTO: 34.7 % (ref 35–47)
HCT VFR BLD AUTO: 34.8 % (ref 35–47)
HCT VFR BLD AUTO: 35.1 % (ref 35–47)
HCT VFR BLD AUTO: 35.5 % (ref 35–47)
HCT VFR BLD AUTO: 35.9 % (ref 35–47)
HCT VFR BLD AUTO: 36.1 % (ref 35–47)
HCT VFR BLD AUTO: 36.2 % (ref 35–47)
HCT VFR BLD AUTO: 36.3 % (ref 35–47)
HCT VFR BLD AUTO: 36.8 % (ref 35–47)
HCT VFR BLD AUTO: 37.6 % (ref 35–47)
HCT VFR BLD AUTO: 38 % (ref 35–47)
HCT VFR BLD AUTO: 38.2 % (ref 35–47)
HCT VFR BLD AUTO: 38.5 % (ref 35–47)
HCT VFR BLD AUTO: 39.7 % (ref 35–47)
HCT VFR BLD AUTO: 40.5 % (ref 35–47)
HCT VFR BLD AUTO: 40.9 % (ref 35–47)
HCT VFR BLD AUTO: 42.7 % (ref 35–47)
HGB BLD-MCNC: 10 G/DL (ref 11.7–15.7)
HGB BLD-MCNC: 10.1 G/DL (ref 11.7–15.7)
HGB BLD-MCNC: 10.3 G/DL (ref 11.7–15.7)
HGB BLD-MCNC: 10.4 G/DL (ref 11.7–15.7)
HGB BLD-MCNC: 10.5 G/DL (ref 11.7–15.7)
HGB BLD-MCNC: 10.6 G/DL (ref 11.7–15.7)
HGB BLD-MCNC: 10.9 G/DL (ref 11.7–15.7)
HGB BLD-MCNC: 11 G/DL (ref 11.7–15.7)
HGB BLD-MCNC: 11.1 G/DL (ref 11.7–15.7)
HGB BLD-MCNC: 11.2 G/DL (ref 11.7–15.7)
HGB BLD-MCNC: 11.3 G/DL (ref 11.7–15.7)
HGB BLD-MCNC: 11.5 G/DL (ref 11.7–15.7)
HGB BLD-MCNC: 11.7 G/DL (ref 11.7–15.7)
HGB BLD-MCNC: 11.8 G/DL (ref 11.7–15.7)
HGB BLD-MCNC: 11.8 G/DL (ref 11.7–15.7)
HGB BLD-MCNC: 12 G/DL (ref 11.7–15.7)
HGB BLD-MCNC: 12 G/DL (ref 11.7–15.7)
HGB BLD-MCNC: 12.2 G/DL (ref 11.7–15.7)
HGB BLD-MCNC: 12.3 G/DL (ref 11.7–15.7)
HGB BLD-MCNC: 12.3 G/DL (ref 11.7–15.7)
HGB BLD-MCNC: 12.4 G/DL (ref 11.7–15.7)
HGB BLD-MCNC: 12.6 G/DL (ref 11.7–15.7)
HGB BLD-MCNC: 13.2 G/DL (ref 11.7–15.7)
HGB BLD-MCNC: 13.4 G/DL (ref 11.7–15.7)
HGB BLD-MCNC: 13.8 G/DL (ref 11.7–15.7)
HGB BLD-MCNC: 13.9 G/DL (ref 11.7–15.7)
HGB BLD-MCNC: 8.9 G/DL (ref 11.7–15.7)
HGB BLD-MCNC: 9.1 G/DL (ref 11.7–15.7)
HGB BLD-MCNC: 9.3 G/DL (ref 11.7–15.7)
HGB BLD-MCNC: 9.3 G/DL (ref 11.7–15.7)
HGB BLD-MCNC: 9.4 G/DL (ref 11.7–15.7)
HGB BLD-MCNC: 9.7 G/DL (ref 11.7–15.7)
HGB BLD-MCNC: 9.8 G/DL (ref 11.7–15.7)
HGB BLD-MCNC: 9.9 G/DL (ref 11.7–15.7)
HGB BLD-MCNC: 9.9 G/DL (ref 11.7–15.7)
HGB UR QL STRIP: ABNORMAL
HGB UR QL STRIP: NEGATIVE
HGB UR QL STRIP: NEGATIVE
IMM GRANULOCYTES # BLD: 0 10E9/L (ref 0–0.4)
IMM GRANULOCYTES # BLD: 0.1 10E9/L (ref 0–0.4)
IMM GRANULOCYTES # BLD: 0.2 10E9/L (ref 0–0.4)
IMM GRANULOCYTES NFR BLD: 0.5 %
IMM GRANULOCYTES NFR BLD: 0.6 %
IMM GRANULOCYTES NFR BLD: 0.7 %
IMM GRANULOCYTES NFR BLD: 0.7 %
IMM GRANULOCYTES NFR BLD: 0.8 %
IMM GRANULOCYTES NFR BLD: 0.9 %
IMM GRANULOCYTES NFR BLD: 1 %
IMM GRANULOCYTES NFR BLD: 1 %
IMM GRANULOCYTES NFR BLD: 1.1 %
IMM GRANULOCYTES NFR BLD: 1.2 %
IMM GRANULOCYTES NFR BLD: 1.3 %
IMM GRANULOCYTES NFR BLD: 1.4 %
IMM GRANULOCYTES NFR BLD: 1.4 %
IMM GRANULOCYTES NFR BLD: 1.5 %
IMM GRANULOCYTES NFR BLD: 1.7 %
IMM GRANULOCYTES NFR BLD: 1.8 %
IMM GRANULOCYTES NFR BLD: 1.8 %
IMM GRANULOCYTES NFR BLD: 2.1 %
INR PPP: 0.96 (ref 0.86–1.14)
INR PPP: 1.13 (ref 0.86–1.14)
INR PPP: 1.2 (ref 0.86–1.14)
INR PPP: 1.23 (ref 0.86–1.14)
INR PPP: 1.49 (ref 0.86–1.14)
INTERPRETATION ECG - MUSE: NORMAL
INTERPRETATION ECG - MUSE: NORMAL
KETONES UR STRIP-MCNC: 10 MG/DL
KETONES UR STRIP-MCNC: 10 MG/DL
KETONES UR STRIP-MCNC: 40 MG/DL
KETONES UR STRIP-MCNC: ABNORMAL MG/DL
KETONES UR STRIP-MCNC: NEGATIVE MG/DL
KETONES UR STRIP-MCNC: NEGATIVE MG/DL
LAB SCANNED RESULT: NORMAL
LAB SCANNED RESULT: NORMAL
LACTATE BLD-SCNC: 0.9 MMOL/L (ref 0.7–2.1)
LACTATE BLD-SCNC: 1 MMOL/L (ref 0.7–2)
LACTATE BLD-SCNC: 1.2 MMOL/L (ref 0.7–2)
LACTATE BLD-SCNC: 1.2 MMOL/L (ref 0.7–2)
LACTATE BLD-SCNC: 2 MMOL/L (ref 0.7–2)
LACTATE BLD-SCNC: 2.1 MMOL/L (ref 0.7–2)
LACTATE BLD-SCNC: 2.2 MMOL/L (ref 0.7–2)
LEUKOCYTE ESTERASE UR QL STRIP: ABNORMAL
LEUKOCYTE ESTERASE UR QL STRIP: NEGATIVE
LEUKOCYTE ESTERASE UR QL STRIP: NEGATIVE
LIPASE SERPL-CCNC: 180 U/L (ref 73–393)
LIPASE SERPL-CCNC: 52 U/L (ref 73–393)
LYMPHOCYTES # BLD AUTO: 0.2 10E9/L (ref 0.8–5.3)
LYMPHOCYTES # BLD AUTO: 0.2 10E9/L (ref 0.8–5.3)
LYMPHOCYTES # BLD AUTO: 0.3 10E9/L (ref 0.8–5.3)
LYMPHOCYTES # BLD AUTO: 0.3 10E9/L (ref 0.8–5.3)
LYMPHOCYTES # BLD AUTO: 0.4 10E9/L (ref 0.8–5.3)
LYMPHOCYTES # BLD AUTO: 0.8 10E9/L (ref 0.8–5.3)
LYMPHOCYTES # BLD AUTO: 1 10E9/L (ref 0.8–5.3)
LYMPHOCYTES # BLD AUTO: 1.2 10E9/L (ref 0.8–5.3)
LYMPHOCYTES # BLD AUTO: 1.3 10E9/L (ref 0.8–5.3)
LYMPHOCYTES # BLD AUTO: 1.4 10E9/L (ref 0.8–5.3)
LYMPHOCYTES # BLD AUTO: 1.4 10E9/L (ref 0.8–5.3)
LYMPHOCYTES # BLD AUTO: 1.5 10E9/L (ref 0.8–5.3)
LYMPHOCYTES # BLD AUTO: 1.6 10E9/L (ref 0.8–5.3)
LYMPHOCYTES # BLD AUTO: 1.7 10E9/L (ref 0.8–5.3)
LYMPHOCYTES # BLD AUTO: 1.8 10E9/L (ref 0.8–5.3)
LYMPHOCYTES # BLD AUTO: 1.8 10E9/L (ref 0.8–5.3)
LYMPHOCYTES # BLD AUTO: 2 10E9/L (ref 0.8–5.3)
LYMPHOCYTES # BLD AUTO: 2.1 10E9/L (ref 0.8–5.3)
LYMPHOCYTES # BLD AUTO: 2.2 10E9/L (ref 0.8–5.3)
LYMPHOCYTES # BLD AUTO: 2.3 10E9/L (ref 0.8–5.3)
LYMPHOCYTES # BLD AUTO: 2.4 10E9/L (ref 0.8–5.3)
LYMPHOCYTES # BLD AUTO: 2.4 10E9/L (ref 0.8–5.3)
LYMPHOCYTES # BLD AUTO: 2.6 10E9/L (ref 0.8–5.3)
LYMPHOCYTES # BLD AUTO: 2.9 10E9/L (ref 0.8–5.3)
LYMPHOCYTES NFR BLD AUTO: 11.3 %
LYMPHOCYTES NFR BLD AUTO: 11.6 %
LYMPHOCYTES NFR BLD AUTO: 12.6 %
LYMPHOCYTES NFR BLD AUTO: 13.5 %
LYMPHOCYTES NFR BLD AUTO: 14.4 %
LYMPHOCYTES NFR BLD AUTO: 14.9 %
LYMPHOCYTES NFR BLD AUTO: 15 %
LYMPHOCYTES NFR BLD AUTO: 16.2 %
LYMPHOCYTES NFR BLD AUTO: 16.8 %
LYMPHOCYTES NFR BLD AUTO: 17.1 %
LYMPHOCYTES NFR BLD AUTO: 2.1 %
LYMPHOCYTES NFR BLD AUTO: 25.7 %
LYMPHOCYTES NFR BLD AUTO: 27.4 %
LYMPHOCYTES NFR BLD AUTO: 28.2 %
LYMPHOCYTES NFR BLD AUTO: 3.5 %
LYMPHOCYTES NFR BLD AUTO: 32 %
LYMPHOCYTES NFR BLD AUTO: 32 %
LYMPHOCYTES NFR BLD AUTO: 34.5 %
LYMPHOCYTES NFR BLD AUTO: 35.6 %
LYMPHOCYTES NFR BLD AUTO: 37 %
LYMPHOCYTES NFR BLD AUTO: 37 %
LYMPHOCYTES NFR BLD AUTO: 38.1 %
LYMPHOCYTES NFR BLD AUTO: 38.6 %
LYMPHOCYTES NFR BLD AUTO: 39.1 %
LYMPHOCYTES NFR BLD AUTO: 39.9 %
LYMPHOCYTES NFR BLD AUTO: 39.9 %
LYMPHOCYTES NFR BLD AUTO: 4.4 %
LYMPHOCYTES NFR BLD AUTO: 40 %
LYMPHOCYTES NFR BLD AUTO: 41.1 %
LYMPHOCYTES NFR BLD AUTO: 43.2 %
LYMPHOCYTES NFR BLD AUTO: 44.1 %
LYMPHOCYTES NFR BLD AUTO: 46.9 %
LYMPHOCYTES NFR BLD AUTO: 47.6 %
LYMPHOCYTES NFR BLD AUTO: 5.2 %
LYMPHOCYTES NFR BLD AUTO: 6.2 %
LYMPHOCYTES NFR BLD AUTO: 8.8 %
LYMPHOCYTES NFR FLD MANUAL: 67 %
LYMPHOCYTES NFR FLD MANUAL: 87 %
Lab: ABNORMAL
Lab: NORMAL
MAGNESIUM SERPL-MCNC: 0.9 MG/DL (ref 1.6–2.3)
MAGNESIUM SERPL-MCNC: 0.9 MG/DL (ref 1.6–2.3)
MAGNESIUM SERPL-MCNC: 1 MG/DL (ref 1.6–2.3)
MAGNESIUM SERPL-MCNC: 1.1 MG/DL (ref 1.6–2.3)
MAGNESIUM SERPL-MCNC: 1.2 MG/DL (ref 1.6–2.3)
MAGNESIUM SERPL-MCNC: 1.3 MG/DL (ref 1.6–2.3)
MAGNESIUM SERPL-MCNC: 1.4 MG/DL (ref 1.6–2.3)
MAGNESIUM SERPL-MCNC: 1.5 MG/DL (ref 1.6–2.3)
MAGNESIUM SERPL-MCNC: 1.5 MG/DL (ref 1.6–2.3)
MAGNESIUM SERPL-MCNC: 1.6 MG/DL (ref 1.6–2.3)
MAGNESIUM SERPL-MCNC: 1.7 MG/DL (ref 1.6–2.3)
MAGNESIUM SERPL-MCNC: 1.8 MG/DL (ref 1.6–2.3)
MAGNESIUM SERPL-MCNC: 1.9 MG/DL (ref 1.6–2.3)
MAGNESIUM SERPL-MCNC: 1.9 MG/DL (ref 1.6–2.3)
MAGNESIUM SERPL-MCNC: 2.2 MG/DL (ref 1.6–2.3)
MAGNESIUM SERPL-MCNC: 2.4 MG/DL (ref 1.6–2.3)
MCH RBC QN AUTO: 28.3 PG (ref 26.5–33)
MCH RBC QN AUTO: 28.5 PG (ref 26.5–33)
MCH RBC QN AUTO: 29 PG (ref 26.5–33)
MCH RBC QN AUTO: 29.1 PG (ref 26.5–33)
MCH RBC QN AUTO: 29.2 PG (ref 26.5–33)
MCH RBC QN AUTO: 29.3 PG (ref 26.5–33)
MCH RBC QN AUTO: 29.7 PG (ref 26.5–33)
MCH RBC QN AUTO: 29.7 PG (ref 26.5–33)
MCH RBC QN AUTO: 29.8 PG (ref 26.5–33)
MCH RBC QN AUTO: 29.8 PG (ref 26.5–33)
MCH RBC QN AUTO: 29.9 PG (ref 26.5–33)
MCH RBC QN AUTO: 31.2 PG (ref 26.5–33)
MCH RBC QN AUTO: 31.4 PG (ref 26.5–33)
MCH RBC QN AUTO: 31.6 PG (ref 26.5–33)
MCH RBC QN AUTO: 31.9 PG (ref 26.5–33)
MCH RBC QN AUTO: 32 PG (ref 26.5–33)
MCH RBC QN AUTO: 32 PG (ref 26.5–33)
MCH RBC QN AUTO: 32.3 PG (ref 26.5–33)
MCH RBC QN AUTO: 32.4 PG (ref 26.5–33)
MCH RBC QN AUTO: 32.5 PG (ref 26.5–33)
MCH RBC QN AUTO: 32.5 PG (ref 26.5–33)
MCH RBC QN AUTO: 32.7 PG (ref 26.5–33)
MCH RBC QN AUTO: 32.8 PG (ref 26.5–33)
MCH RBC QN AUTO: 32.9 PG (ref 26.5–33)
MCH RBC QN AUTO: 33 PG (ref 26.5–33)
MCH RBC QN AUTO: 33.1 PG (ref 26.5–33)
MCH RBC QN AUTO: 33.1 PG (ref 26.5–33)
MCH RBC QN AUTO: 33.7 PG (ref 26.5–33)
MCH RBC QN AUTO: 33.9 PG (ref 26.5–33)
MCH RBC QN AUTO: 34 PG (ref 26.5–33)
MCH RBC QN AUTO: 34.3 PG (ref 26.5–33)
MCH RBC QN AUTO: 34.5 PG (ref 26.5–33)
MCH RBC QN AUTO: 34.7 PG (ref 26.5–33)
MCH RBC QN AUTO: 34.9 PG (ref 26.5–33)
MCH RBC QN AUTO: 35 PG (ref 26.5–33)
MCH RBC QN AUTO: 35 PG (ref 26.5–33)
MCH RBC QN AUTO: 35.6 PG (ref 26.5–33)
MCH RBC QN AUTO: 35.7 PG (ref 26.5–33)
MCH RBC QN AUTO: 35.8 PG (ref 26.5–33)
MCH RBC QN AUTO: 36 PG (ref 26.5–33)
MCH RBC QN AUTO: 36.2 PG (ref 26.5–33)
MCHC RBC AUTO-ENTMCNC: 30.8 G/DL (ref 31.5–36.5)
MCHC RBC AUTO-ENTMCNC: 30.9 G/DL (ref 31.5–36.5)
MCHC RBC AUTO-ENTMCNC: 31.6 G/DL (ref 31.5–36.5)
MCHC RBC AUTO-ENTMCNC: 31.6 G/DL (ref 31.5–36.5)
MCHC RBC AUTO-ENTMCNC: 31.8 G/DL (ref 31.5–36.5)
MCHC RBC AUTO-ENTMCNC: 31.9 G/DL (ref 31.5–36.5)
MCHC RBC AUTO-ENTMCNC: 32 G/DL (ref 31.5–36.5)
MCHC RBC AUTO-ENTMCNC: 32.2 G/DL (ref 31.5–36.5)
MCHC RBC AUTO-ENTMCNC: 32.3 G/DL (ref 31.5–36.5)
MCHC RBC AUTO-ENTMCNC: 32.4 G/DL (ref 31.5–36.5)
MCHC RBC AUTO-ENTMCNC: 32.5 G/DL (ref 31.5–36.5)
MCHC RBC AUTO-ENTMCNC: 32.5 G/DL (ref 31.5–36.5)
MCHC RBC AUTO-ENTMCNC: 32.6 G/DL (ref 31.5–36.5)
MCHC RBC AUTO-ENTMCNC: 32.7 G/DL (ref 31.5–36.5)
MCHC RBC AUTO-ENTMCNC: 32.7 G/DL (ref 31.5–36.5)
MCHC RBC AUTO-ENTMCNC: 32.8 G/DL (ref 31.5–36.5)
MCHC RBC AUTO-ENTMCNC: 32.9 G/DL (ref 31.5–36.5)
MCHC RBC AUTO-ENTMCNC: 32.9 G/DL (ref 31.5–36.5)
MCHC RBC AUTO-ENTMCNC: 33 G/DL (ref 31.5–36.5)
MCHC RBC AUTO-ENTMCNC: 33.1 G/DL (ref 31.5–36.5)
MCHC RBC AUTO-ENTMCNC: 33.2 G/DL (ref 31.5–36.5)
MCHC RBC AUTO-ENTMCNC: 33.3 G/DL (ref 31.5–36.5)
MCHC RBC AUTO-ENTMCNC: 33.4 G/DL (ref 31.5–36.5)
MCHC RBC AUTO-ENTMCNC: 33.6 G/DL (ref 31.5–36.5)
MCHC RBC AUTO-ENTMCNC: 33.7 G/DL (ref 31.5–36.5)
MCHC RBC AUTO-ENTMCNC: 33.7 G/DL (ref 31.5–36.5)
MCHC RBC AUTO-ENTMCNC: 34 G/DL (ref 31.5–36.5)
MCHC RBC AUTO-ENTMCNC: 34.1 G/DL (ref 31.5–36.5)
MCV RBC AUTO: 100 FL (ref 78–100)
MCV RBC AUTO: 101 FL (ref 78–100)
MCV RBC AUTO: 102 FL (ref 78–100)
MCV RBC AUTO: 103 FL (ref 78–100)
MCV RBC AUTO: 103 FL (ref 78–100)
MCV RBC AUTO: 104 FL (ref 78–100)
MCV RBC AUTO: 105 FL (ref 78–100)
MCV RBC AUTO: 106 FL (ref 78–100)
MCV RBC AUTO: 106 FL (ref 78–100)
MCV RBC AUTO: 107 FL (ref 78–100)
MCV RBC AUTO: 108 FL (ref 78–100)
MCV RBC AUTO: 84 FL (ref 78–100)
MCV RBC AUTO: 87 FL (ref 78–100)
MCV RBC AUTO: 87 FL (ref 78–100)
MCV RBC AUTO: 89 FL (ref 78–100)
MCV RBC AUTO: 90 FL (ref 78–100)
MCV RBC AUTO: 91 FL (ref 78–100)
MCV RBC AUTO: 92 FL (ref 78–100)
MCV RBC AUTO: 93 FL (ref 78–100)
MCV RBC AUTO: 94 FL (ref 78–100)
MCV RBC AUTO: 95 FL (ref 78–100)
MCV RBC AUTO: 95 FL (ref 78–100)
MCV RBC AUTO: 97 FL (ref 78–100)
MCV RBC AUTO: 98 FL (ref 78–100)
MCV RBC AUTO: 99 FL (ref 78–100)
METAMYELOCYTES # BLD: 0.3 10E9/L
METAMYELOCYTES NFR BLD MANUAL: 3 %
MONOCYTES # BLD AUTO: 0.1 10E9/L (ref 0–1.3)
MONOCYTES # BLD AUTO: 0.2 10E9/L (ref 0–1.3)
MONOCYTES # BLD AUTO: 0.2 10E9/L (ref 0–1.3)
MONOCYTES # BLD AUTO: 0.3 10E9/L (ref 0–1.3)
MONOCYTES # BLD AUTO: 0.4 10E9/L (ref 0–1.3)
MONOCYTES # BLD AUTO: 0.5 10E9/L (ref 0–1.3)
MONOCYTES # BLD AUTO: 0.6 10E9/L (ref 0–1.3)
MONOCYTES # BLD AUTO: 0.6 10E9/L (ref 0–1.3)
MONOCYTES # BLD AUTO: 0.7 10E9/L (ref 0–1.3)
MONOCYTES # BLD AUTO: 0.8 10E9/L (ref 0–1.3)
MONOCYTES # BLD AUTO: 0.9 10E9/L (ref 0–1.3)
MONOCYTES # BLD AUTO: 1 10E9/L (ref 0–1.3)
MONOCYTES # BLD AUTO: 1.1 10E9/L (ref 0–1.3)
MONOCYTES # BLD AUTO: 1.1 10E9/L (ref 0–1.3)
MONOCYTES # BLD AUTO: 1.3 10E9/L (ref 0–1.3)
MONOCYTES # BLD AUTO: 1.6 10E9/L (ref 0–1.3)
MONOCYTES NFR BLD AUTO: 10 %
MONOCYTES NFR BLD AUTO: 10.1 %
MONOCYTES NFR BLD AUTO: 10.3 %
MONOCYTES NFR BLD AUTO: 10.6 %
MONOCYTES NFR BLD AUTO: 10.6 %
MONOCYTES NFR BLD AUTO: 11.4 %
MONOCYTES NFR BLD AUTO: 11.9 %
MONOCYTES NFR BLD AUTO: 12.2 %
MONOCYTES NFR BLD AUTO: 12.6 %
MONOCYTES NFR BLD AUTO: 12.7 %
MONOCYTES NFR BLD AUTO: 15.7 %
MONOCYTES NFR BLD AUTO: 16.3 %
MONOCYTES NFR BLD AUTO: 17.1 %
MONOCYTES NFR BLD AUTO: 2.6 %
MONOCYTES NFR BLD AUTO: 2.6 %
MONOCYTES NFR BLD AUTO: 3 %
MONOCYTES NFR BLD AUTO: 3.5 %
MONOCYTES NFR BLD AUTO: 5.3 %
MONOCYTES NFR BLD AUTO: 5.4 %
MONOCYTES NFR BLD AUTO: 7 %
MONOCYTES NFR BLD AUTO: 7 %
MONOCYTES NFR BLD AUTO: 7.5 %
MONOCYTES NFR BLD AUTO: 7.6 %
MONOCYTES NFR BLD AUTO: 7.7 %
MONOCYTES NFR BLD AUTO: 7.8 %
MONOCYTES NFR BLD AUTO: 8.1 %
MONOCYTES NFR BLD AUTO: 8.1 %
MONOCYTES NFR BLD AUTO: 8.5 %
MONOCYTES NFR BLD AUTO: 8.6 %
MONOCYTES NFR BLD AUTO: 8.9 %
MONOCYTES NFR BLD AUTO: 8.9 %
MONOCYTES NFR BLD AUTO: 9 %
MONOCYTES NFR BLD AUTO: 9.2 %
MONOCYTES NFR BLD AUTO: 9.2 %
MONOCYTES NFR BLD AUTO: 9.4 %
MONOCYTES NFR BLD AUTO: 9.8 %
MRSA DNA SPEC QL NAA+PROBE: NEGATIVE
MUCOUS THREADS #/AREA URNS LPF: PRESENT /LPF
MYELOCYTES # BLD: 0.1 10E9/L
MYELOCYTES # BLD: 0.4 10E9/L
MYELOCYTES NFR BLD MANUAL: 1 %
MYELOCYTES NFR BLD MANUAL: 2.6 %
NEUTROPHILS # BLD AUTO: 1.8 10E9/L (ref 1.6–8.3)
NEUTROPHILS # BLD AUTO: 1.9 10E9/L (ref 1.6–8.3)
NEUTROPHILS # BLD AUTO: 1.9 10E9/L (ref 1.6–8.3)
NEUTROPHILS # BLD AUTO: 11 10E9/L (ref 1.6–8.3)
NEUTROPHILS # BLD AUTO: 18.1 10E9/L (ref 1.6–8.3)
NEUTROPHILS # BLD AUTO: 2 10E9/L (ref 1.6–8.3)
NEUTROPHILS # BLD AUTO: 2.2 10E9/L (ref 1.6–8.3)
NEUTROPHILS # BLD AUTO: 2.3 10E9/L (ref 1.6–8.3)
NEUTROPHILS # BLD AUTO: 2.3 10E9/L (ref 1.6–8.3)
NEUTROPHILS # BLD AUTO: 2.4 10E9/L (ref 1.6–8.3)
NEUTROPHILS # BLD AUTO: 2.7 10E9/L (ref 1.6–8.3)
NEUTROPHILS # BLD AUTO: 2.7 10E9/L (ref 1.6–8.3)
NEUTROPHILS # BLD AUTO: 2.9 10E9/L (ref 1.6–8.3)
NEUTROPHILS # BLD AUTO: 3.1 10E9/L (ref 1.6–8.3)
NEUTROPHILS # BLD AUTO: 3.2 10E9/L (ref 1.6–8.3)
NEUTROPHILS # BLD AUTO: 3.4 10E9/L (ref 1.6–8.3)
NEUTROPHILS # BLD AUTO: 3.5 10E9/L (ref 1.6–8.3)
NEUTROPHILS # BLD AUTO: 3.7 10E9/L (ref 1.6–8.3)
NEUTROPHILS # BLD AUTO: 3.8 10E9/L (ref 1.6–8.3)
NEUTROPHILS # BLD AUTO: 4 10E9/L (ref 1.6–8.3)
NEUTROPHILS # BLD AUTO: 4.2 10E9/L (ref 1.6–8.3)
NEUTROPHILS # BLD AUTO: 4.3 10E9/L (ref 1.6–8.3)
NEUTROPHILS # BLD AUTO: 4.6 10E9/L (ref 1.6–8.3)
NEUTROPHILS # BLD AUTO: 4.9 10E9/L (ref 1.6–8.3)
NEUTROPHILS # BLD AUTO: 5.5 10E9/L (ref 1.6–8.3)
NEUTROPHILS # BLD AUTO: 5.8 10E9/L (ref 1.6–8.3)
NEUTROPHILS # BLD AUTO: 6.1 10E9/L (ref 1.6–8.3)
NEUTROPHILS # BLD AUTO: 6.4 10E9/L (ref 1.6–8.3)
NEUTROPHILS # BLD AUTO: 7.1 10E9/L (ref 1.6–8.3)
NEUTROPHILS # BLD AUTO: 7.4 10E9/L (ref 1.6–8.3)
NEUTROPHILS # BLD AUTO: 9.1 10E9/L (ref 1.6–8.3)
NEUTROPHILS # BLD AUTO: 9.4 10E9/L (ref 1.6–8.3)
NEUTROPHILS # BLD AUTO: 9.5 10E9/L (ref 1.6–8.3)
NEUTROPHILS # BLD AUTO: 9.6 10E9/L (ref 1.6–8.3)
NEUTROPHILS NFR BLD AUTO: 40.8 %
NEUTROPHILS NFR BLD AUTO: 41.3 %
NEUTROPHILS NFR BLD AUTO: 43.5 %
NEUTROPHILS NFR BLD AUTO: 44.1 %
NEUTROPHILS NFR BLD AUTO: 44.1 %
NEUTROPHILS NFR BLD AUTO: 45.1 %
NEUTROPHILS NFR BLD AUTO: 45.9 %
NEUTROPHILS NFR BLD AUTO: 46.8 %
NEUTROPHILS NFR BLD AUTO: 46.9 %
NEUTROPHILS NFR BLD AUTO: 47.7 %
NEUTROPHILS NFR BLD AUTO: 49.4 %
NEUTROPHILS NFR BLD AUTO: 49.4 %
NEUTROPHILS NFR BLD AUTO: 49.9 %
NEUTROPHILS NFR BLD AUTO: 51.2 %
NEUTROPHILS NFR BLD AUTO: 51.6 %
NEUTROPHILS NFR BLD AUTO: 53.7 %
NEUTROPHILS NFR BLD AUTO: 54 %
NEUTROPHILS NFR BLD AUTO: 54.3 %
NEUTROPHILS NFR BLD AUTO: 55.9 %
NEUTROPHILS NFR BLD AUTO: 58 %
NEUTROPHILS NFR BLD AUTO: 64.3 %
NEUTROPHILS NFR BLD AUTO: 69.1 %
NEUTROPHILS NFR BLD AUTO: 69.9 %
NEUTROPHILS NFR BLD AUTO: 70.1 %
NEUTROPHILS NFR BLD AUTO: 70.8 %
NEUTROPHILS NFR BLD AUTO: 72 %
NEUTROPHILS NFR BLD AUTO: 74.3 %
NEUTROPHILS NFR BLD AUTO: 77.2 %
NEUTROPHILS NFR BLD AUTO: 78.1 %
NEUTROPHILS NFR BLD AUTO: 79.6 %
NEUTROPHILS NFR BLD AUTO: 82.1 %
NEUTROPHILS NFR BLD AUTO: 87 %
NEUTROPHILS NFR BLD AUTO: 90.3 %
NEUTROPHILS NFR BLD AUTO: 93 %
NEUTROPHILS NFR BLD AUTO: 93.9 %
NEUTROPHILS NFR BLD AUTO: 93.9 %
NEUTS BAND NFR FLD MANUAL: 12 %
NEUTS BAND NFR FLD MANUAL: 7 %
NEUTS HYPERSEG BLD QL SMEAR: PRESENT
NEUTS HYPERSEG BLD QL SMEAR: PRESENT
NITRATE UR QL: NEGATIVE
NRBC # BLD AUTO: 0 10*3/UL
NRBC # BLD AUTO: 0.1 10*3/UL
NRBC BLD AUTO-RTO: 0 /100
NRBC BLD AUTO-RTO: 1 /100
NRBC BLD AUTO-RTO: 1 /100
OTHER CELLS FLD MANUAL: 1 %
OTHER CELLS FLD MANUAL: 26 %
OVALOCYTES BLD QL SMEAR: SLIGHT
PCO2 BLDV: 28 MM HG (ref 40–50)
PH BLDV: 7.45 PH (ref 7.32–7.43)
PH UR STRIP: 6 PH (ref 5–7)
PH UR STRIP: 6.5 PH (ref 5–7)
PH UR STRIP: 6.5 PH (ref 5–7)
PHOSPHATE SERPL-MCNC: 1.6 MG/DL (ref 2.5–4.5)
PHOSPHATE SERPL-MCNC: 1.9 MG/DL (ref 2.5–4.5)
PHOSPHATE SERPL-MCNC: 2 MG/DL (ref 2.5–4.5)
PHOSPHATE SERPL-MCNC: 2.1 MG/DL (ref 2.5–4.5)
PHOSPHATE SERPL-MCNC: 2.4 MG/DL (ref 2.5–4.5)
PHOSPHATE SERPL-MCNC: 2.5 MG/DL (ref 2.5–4.5)
PHOSPHATE SERPL-MCNC: 2.7 MG/DL (ref 2.5–4.5)
PHOSPHATE SERPL-MCNC: 3 MG/DL (ref 2.5–4.5)
PHOSPHATE SERPL-MCNC: 3.2 MG/DL (ref 2.5–4.5)
PHOSPHATE SERPL-MCNC: 3.3 MG/DL (ref 2.5–4.5)
PHOSPHATE SERPL-MCNC: 3.5 MG/DL (ref 2.5–4.5)
PHOSPHATE SERPL-MCNC: 4.9 MG/DL (ref 2.5–4.5)
PLATELET # BLD AUTO: 104 10E9/L (ref 150–450)
PLATELET # BLD AUTO: 104 10E9/L (ref 150–450)
PLATELET # BLD AUTO: 142 10E9/L (ref 150–450)
PLATELET # BLD AUTO: 211 10E9/L (ref 150–450)
PLATELET # BLD AUTO: 250 10E9/L (ref 150–450)
PLATELET # BLD AUTO: 255 10E9/L (ref 150–450)
PLATELET # BLD AUTO: 259 10E9/L (ref 150–450)
PLATELET # BLD AUTO: 261 10E9/L (ref 150–450)
PLATELET # BLD AUTO: 262 10E9/L (ref 150–450)
PLATELET # BLD AUTO: 263 10E9/L (ref 150–450)
PLATELET # BLD AUTO: 265 10E9/L (ref 150–450)
PLATELET # BLD AUTO: 270 10E9/L (ref 150–450)
PLATELET # BLD AUTO: 275 10E9/L (ref 150–450)
PLATELET # BLD AUTO: 279 10E9/L (ref 150–450)
PLATELET # BLD AUTO: 287 10E9/L (ref 150–450)
PLATELET # BLD AUTO: 287 10E9/L (ref 150–450)
PLATELET # BLD AUTO: 289 10E9/L (ref 150–450)
PLATELET # BLD AUTO: 290 10E9/L (ref 150–450)
PLATELET # BLD AUTO: 295 10E9/L (ref 150–450)
PLATELET # BLD AUTO: 298 10E9/L (ref 150–450)
PLATELET # BLD AUTO: 299 10E9/L (ref 150–450)
PLATELET # BLD AUTO: 300 10E9/L (ref 150–450)
PLATELET # BLD AUTO: 301 10E9/L (ref 150–450)
PLATELET # BLD AUTO: 303 10E9/L (ref 150–450)
PLATELET # BLD AUTO: 308 10E9/L (ref 150–450)
PLATELET # BLD AUTO: 309 10E9/L (ref 150–450)
PLATELET # BLD AUTO: 314 10E9/L (ref 150–450)
PLATELET # BLD AUTO: 315 10E9/L (ref 150–450)
PLATELET # BLD AUTO: 316 10E9/L (ref 150–450)
PLATELET # BLD AUTO: 316 10E9/L (ref 150–450)
PLATELET # BLD AUTO: 320 10E9/L (ref 150–450)
PLATELET # BLD AUTO: 320 10E9/L (ref 150–450)
PLATELET # BLD AUTO: 324 10E9/L (ref 150–450)
PLATELET # BLD AUTO: 326 10E9/L (ref 150–450)
PLATELET # BLD AUTO: 330 10E9/L (ref 150–450)
PLATELET # BLD AUTO: 342 10E9/L (ref 150–450)
PLATELET # BLD AUTO: 345 10E9/L (ref 150–450)
PLATELET # BLD AUTO: 349 10E9/L (ref 150–450)
PLATELET # BLD AUTO: 377 10E9/L (ref 150–450)
PLATELET # BLD AUTO: 412 10E9/L (ref 150–450)
PLATELET # BLD AUTO: 418 10E9/L (ref 150–450)
PLATELET # BLD AUTO: 425 10E9/L (ref 150–450)
PLATELET # BLD AUTO: 448 10E9/L (ref 150–450)
PLATELET # BLD AUTO: 508 10E9/L (ref 150–450)
PLATELET # BLD AUTO: 80 10E9/L (ref 150–450)
PLATELET # BLD AUTO: 85 10E9/L (ref 150–450)
PLATELET # BLD AUTO: 86 10E9/L (ref 150–450)
PLATELET # BLD EST: ABNORMAL 10*3/UL
PO2 BLDV: 32 MM HG (ref 25–47)
POIKILOCYTOSIS BLD QL SMEAR: SLIGHT
POIKILOCYTOSIS BLD QL SMEAR: SLIGHT
POLYCHROMASIA BLD QL SMEAR: SLIGHT
POLYCHROMASIA BLD QL SMEAR: SLIGHT
POTASSIUM SERPL-SCNC: 2.8 MMOL/L (ref 3.4–5.3)
POTASSIUM SERPL-SCNC: 3 MMOL/L (ref 3.4–5.3)
POTASSIUM SERPL-SCNC: 3.1 MMOL/L (ref 3.4–5.3)
POTASSIUM SERPL-SCNC: 3.1 MMOL/L (ref 3.4–5.3)
POTASSIUM SERPL-SCNC: 3.2 MMOL/L (ref 3.4–5.3)
POTASSIUM SERPL-SCNC: 3.2 MMOL/L (ref 3.4–5.3)
POTASSIUM SERPL-SCNC: 3.3 MMOL/L (ref 3.4–5.3)
POTASSIUM SERPL-SCNC: 3.4 MMOL/L (ref 3.4–5.3)
POTASSIUM SERPL-SCNC: 3.5 MMOL/L (ref 3.4–5.3)
POTASSIUM SERPL-SCNC: 3.6 MMOL/L (ref 3.4–5.3)
POTASSIUM SERPL-SCNC: 3.7 MMOL/L (ref 3.4–5.3)
POTASSIUM SERPL-SCNC: 3.8 MMOL/L (ref 3.4–5.3)
POTASSIUM SERPL-SCNC: 3.8 MMOL/L (ref 3.4–5.3)
POTASSIUM SERPL-SCNC: 3.9 MMOL/L (ref 3.4–5.3)
POTASSIUM SERPL-SCNC: 3.9 MMOL/L (ref 3.4–5.3)
POTASSIUM SERPL-SCNC: 4 MMOL/L (ref 3.4–5.3)
POTASSIUM SERPL-SCNC: 4.1 MMOL/L (ref 3.4–5.3)
POTASSIUM SERPL-SCNC: 4.2 MMOL/L (ref 3.4–5.3)
POTASSIUM SERPL-SCNC: 4.2 MMOL/L (ref 3.4–5.3)
POTASSIUM SERPL-SCNC: 4.3 MMOL/L (ref 3.4–5.3)
POTASSIUM SERPL-SCNC: 4.4 MMOL/L (ref 3.4–5.3)
POTASSIUM SERPL-SCNC: 4.5 MMOL/L (ref 3.4–5.3)
POTASSIUM SERPL-SCNC: 5.6 MMOL/L (ref 3.4–5.3)
POTASSIUM SERPL-SCNC: NORMAL MMOL/L (ref 3.4–5.3)
PROCALCITONIN SERPL-MCNC: 5.98 NG/ML
PROT SERPL-MCNC: 5.1 G/DL (ref 6.8–8.8)
PROT SERPL-MCNC: 5.2 G/DL (ref 6.8–8.8)
PROT SERPL-MCNC: 5.3 G/DL (ref 6.8–8.8)
PROT SERPL-MCNC: 5.3 G/DL (ref 6.8–8.8)
PROT SERPL-MCNC: 5.6 G/DL (ref 6.8–8.8)
PROT SERPL-MCNC: 5.7 G/DL (ref 6.8–8.8)
PROT SERPL-MCNC: 5.9 G/DL (ref 6.8–8.8)
PROT SERPL-MCNC: 6.1 G/DL (ref 6.8–8.8)
PROT SERPL-MCNC: 6.3 G/DL (ref 6.8–8.8)
PROT SERPL-MCNC: 6.4 G/DL (ref 6.8–8.8)
PROT SERPL-MCNC: 6.6 G/DL (ref 6.8–8.8)
PROT SERPL-MCNC: 6.9 G/DL (ref 6.8–8.8)
PROT SERPL-MCNC: 7 G/DL (ref 6.8–8.8)
PROT SERPL-MCNC: 7 G/DL (ref 6.8–8.8)
PROT SERPL-MCNC: 7.1 G/DL (ref 6.8–8.8)
PROT SERPL-MCNC: 7.1 G/DL (ref 6.8–8.8)
PROT SERPL-MCNC: 7.2 G/DL (ref 6.8–8.8)
PROT SERPL-MCNC: 7.2 G/DL (ref 6.8–8.8)
PROT SERPL-MCNC: 7.3 G/DL (ref 6.8–8.8)
PROT SERPL-MCNC: 7.5 G/DL (ref 6.8–8.8)
PROT SERPL-MCNC: 7.9 G/DL (ref 6.8–8.8)
PROT SERPL-MCNC: 7.9 G/DL (ref 6.8–8.8)
PROT SERPL-MCNC: NORMAL G/DL (ref 6.8–8.8)
RADIOLOGIST FLAGS: ABNORMAL
RBC # BLD AUTO: 2.91 10E12/L (ref 3.8–5.2)
RBC # BLD AUTO: 2.91 10E12/L (ref 3.8–5.2)
RBC # BLD AUTO: 2.97 10E12/L (ref 3.8–5.2)
RBC # BLD AUTO: 2.99 10E12/L (ref 3.8–5.2)
RBC # BLD AUTO: 2.99 10E12/L (ref 3.8–5.2)
RBC # BLD AUTO: 3.06 10E12/L (ref 3.8–5.2)
RBC # BLD AUTO: 3.07 10E12/L (ref 3.8–5.2)
RBC # BLD AUTO: 3.08 10E12/L (ref 3.8–5.2)
RBC # BLD AUTO: 3.08 10E12/L (ref 3.8–5.2)
RBC # BLD AUTO: 3.13 10E12/L (ref 3.8–5.2)
RBC # BLD AUTO: 3.14 10E12/L (ref 3.8–5.2)
RBC # BLD AUTO: 3.15 10E12/L (ref 3.8–5.2)
RBC # BLD AUTO: 3.18 10E12/L (ref 3.8–5.2)
RBC # BLD AUTO: 3.18 10E12/L (ref 3.8–5.2)
RBC # BLD AUTO: 3.19 10E12/L (ref 3.8–5.2)
RBC # BLD AUTO: 3.2 10E12/L (ref 3.8–5.2)
RBC # BLD AUTO: 3.25 10E12/L (ref 3.8–5.2)
RBC # BLD AUTO: 3.31 10E12/L (ref 3.8–5.2)
RBC # BLD AUTO: 3.32 10E12/L (ref 3.8–5.2)
RBC # BLD AUTO: 3.32 10E12/L (ref 3.8–5.2)
RBC # BLD AUTO: 3.33 10E12/L (ref 3.8–5.2)
RBC # BLD AUTO: 3.34 10E12/L (ref 3.8–5.2)
RBC # BLD AUTO: 3.35 10E12/L (ref 3.8–5.2)
RBC # BLD AUTO: 3.39 10E12/L (ref 3.8–5.2)
RBC # BLD AUTO: 3.47 10E12/L (ref 3.8–5.2)
RBC # BLD AUTO: 3.52 10E12/L (ref 3.8–5.2)
RBC # BLD AUTO: 3.53 10E12/L (ref 3.8–5.2)
RBC # BLD AUTO: 3.59 10E12/L (ref 3.8–5.2)
RBC # BLD AUTO: 3.6 10E12/L (ref 3.8–5.2)
RBC # BLD AUTO: 3.63 10E12/L (ref 3.8–5.2)
RBC # BLD AUTO: 3.66 10E12/L (ref 3.8–5.2)
RBC # BLD AUTO: 3.75 10E12/L (ref 3.8–5.2)
RBC # BLD AUTO: 3.75 10E12/L (ref 3.8–5.2)
RBC # BLD AUTO: 3.76 10E12/L (ref 3.8–5.2)
RBC # BLD AUTO: 3.78 10E12/L (ref 3.8–5.2)
RBC # BLD AUTO: 3.84 10E12/L (ref 3.8–5.2)
RBC # BLD AUTO: 3.85 10E12/L (ref 3.8–5.2)
RBC # BLD AUTO: 3.86 10E12/L (ref 3.8–5.2)
RBC # BLD AUTO: 3.97 10E12/L (ref 3.8–5.2)
RBC # BLD AUTO: 3.98 10E12/L (ref 3.8–5.2)
RBC # BLD AUTO: 4.1 10E12/L (ref 3.8–5.2)
RBC # BLD AUTO: 4.11 10E12/L (ref 3.8–5.2)
RBC # BLD AUTO: 4.14 10E12/L (ref 3.8–5.2)
RBC # BLD AUTO: 4.23 10E12/L (ref 3.8–5.2)
RBC #/AREA URNS AUTO: 0 /HPF (ref 0–2)
RBC #/AREA URNS AUTO: <1 /HPF (ref 0–2)
RBC #/AREA URNS AUTO: >182 /HPF (ref 0–2)
RBC MORPH BLD: NORMAL
SAO2 % BLDV FROM PO2: 66 %
SODIUM SERPL-SCNC: 126 MMOL/L (ref 133–144)
SODIUM SERPL-SCNC: 127 MMOL/L (ref 133–144)
SODIUM SERPL-SCNC: 128 MMOL/L (ref 133–144)
SODIUM SERPL-SCNC: 129 MMOL/L (ref 133–144)
SODIUM SERPL-SCNC: 130 MMOL/L (ref 133–144)
SODIUM SERPL-SCNC: 131 MMOL/L (ref 133–144)
SODIUM SERPL-SCNC: 132 MMOL/L (ref 133–144)
SODIUM SERPL-SCNC: 133 MMOL/L (ref 133–144)
SODIUM SERPL-SCNC: 134 MMOL/L (ref 133–144)
SODIUM SERPL-SCNC: 135 MMOL/L (ref 133–144)
SODIUM SERPL-SCNC: 135 MMOL/L (ref 133–144)
SODIUM SERPL-SCNC: 136 MMOL/L (ref 133–144)
SODIUM SERPL-SCNC: 138 MMOL/L (ref 133–144)
SODIUM SERPL-SCNC: 139 MMOL/L (ref 133–144)
SODIUM SERPL-SCNC: 139 MMOL/L (ref 133–144)
SODIUM SERPL-SCNC: 141 MMOL/L (ref 133–144)
SODIUM SERPL-SCNC: NORMAL MMOL/L (ref 133–144)
SOURCE: ABNORMAL
SP GR UR STRIP: 1.01 (ref 1–1.03)
SP GR UR STRIP: 1.02 (ref 1–1.03)
SP GR UR STRIP: 1.02 (ref 1–1.03)
SPECIMEN EXP DATE BLD: NORMAL
SPECIMEN SOURCE FLD: NORMAL
SPECIMEN SOURCE FLD: NORMAL
SPECIMEN SOURCE: ABNORMAL
SPECIMEN SOURCE: NORMAL
SQUAMOUS #/AREA URNS AUTO: 1 /HPF (ref 0–1)
SQUAMOUS #/AREA URNS AUTO: 1 /HPF (ref 0–1)
SQUAMOUS #/AREA URNS AUTO: 2 /HPF (ref 0–1)
SQUAMOUS #/AREA URNS AUTO: 8 /HPF (ref 0–1)
SQUAMOUS #/AREA URNS AUTO: <1 /HPF (ref 0–1)
TARGETS BLD QL SMEAR: SLIGHT
TRANS CELLS #/AREA URNS HPF: 1 /HPF (ref 0–1)
TROPONIN I SERPL-MCNC: 0.14 UG/L (ref 0–0.04)
TROPONIN I SERPL-MCNC: 0.16 UG/L (ref 0–0.04)
TROPONIN I SERPL-MCNC: 0.24 UG/L (ref 0–0.04)
UROBILINOGEN UR STRIP-MCNC: 0.2 MG/DL (ref 0–2)
UROBILINOGEN UR STRIP-MCNC: 6 MG/DL (ref 0–2)
UROBILINOGEN UR STRIP-MCNC: NORMAL MG/DL (ref 0–2)
WBC # BLD AUTO: 10.3 10E9/L (ref 4–11)
WBC # BLD AUTO: 10.5 10E9/L (ref 4–11)
WBC # BLD AUTO: 11.4 10E9/L (ref 4–11)
WBC # BLD AUTO: 11.4 10E9/L (ref 4–11)
WBC # BLD AUTO: 11.7 10E9/L (ref 4–11)
WBC # BLD AUTO: 11.7 10E9/L (ref 4–11)
WBC # BLD AUTO: 11.9 10E9/L (ref 4–11)
WBC # BLD AUTO: 12.1 10E9/L (ref 4–11)
WBC # BLD AUTO: 12.7 10E9/L (ref 4–11)
WBC # BLD AUTO: 13.1 10E9/L (ref 4–11)
WBC # BLD AUTO: 13.3 10E9/L (ref 4–11)
WBC # BLD AUTO: 14.3 10E9/L (ref 4–11)
WBC # BLD AUTO: 15.1 10E9/L (ref 4–11)
WBC # BLD AUTO: 19.3 10E9/L (ref 4–11)
WBC # BLD AUTO: 4.1 10E9/L (ref 4–11)
WBC # BLD AUTO: 4.2 10E9/L (ref 4–11)
WBC # BLD AUTO: 4.2 10E9/L (ref 4–11)
WBC # BLD AUTO: 4.3 10E9/L (ref 4–11)
WBC # BLD AUTO: 4.5 10E9/L (ref 4–11)
WBC # BLD AUTO: 4.5 10E9/L (ref 4–11)
WBC # BLD AUTO: 5 10E9/L (ref 4–11)
WBC # BLD AUTO: 5.1 10E9/L (ref 4–11)
WBC # BLD AUTO: 5.3 10E9/L (ref 4–11)
WBC # BLD AUTO: 5.4 10E9/L (ref 4–11)
WBC # BLD AUTO: 5.4 10E9/L (ref 4–11)
WBC # BLD AUTO: 5.5 10E9/L (ref 4–11)
WBC # BLD AUTO: 5.6 10E9/L (ref 4–11)
WBC # BLD AUTO: 5.9 10E9/L (ref 4–11)
WBC # BLD AUTO: 6 10E9/L (ref 4–11)
WBC # BLD AUTO: 6 10E9/L (ref 4–11)
WBC # BLD AUTO: 6.2 10E9/L (ref 4–11)
WBC # BLD AUTO: 6.2 10E9/L (ref 4–11)
WBC # BLD AUTO: 6.5 10E9/L (ref 4–11)
WBC # BLD AUTO: 6.5 10E9/L (ref 4–11)
WBC # BLD AUTO: 6.6 10E9/L (ref 4–11)
WBC # BLD AUTO: 7.2 10E9/L (ref 4–11)
WBC # BLD AUTO: 7.3 10E9/L (ref 4–11)
WBC # BLD AUTO: 7.4 10E9/L (ref 4–11)
WBC # BLD AUTO: 7.6 10E9/L (ref 4–11)
WBC # BLD AUTO: 8.3 10E9/L (ref 4–11)
WBC # BLD AUTO: 8.9 10E9/L (ref 4–11)
WBC # BLD AUTO: 9.6 10E9/L (ref 4–11)
WBC # FLD AUTO: 1076 /UL
WBC # FLD AUTO: 849 /UL
WBC #/AREA URNS AUTO: 3 /HPF (ref 0–5)
WBC #/AREA URNS AUTO: 55 /HPF (ref 0–5)
WBC #/AREA URNS AUTO: 58 /HPF (ref 0–5)
WBC #/AREA URNS AUTO: 6 /HPF (ref 0–5)
WBC #/AREA URNS AUTO: <1 /HPF (ref 0–5)
WBC #/AREA URNS AUTO: >182 /HPF (ref 0–5)

## 2019-01-01 PROCEDURE — 25800030 ZZH RX IP 258 OP 636: Performed by: OBSTETRICS & GYNECOLOGY

## 2019-01-01 PROCEDURE — 99285 EMERGENCY DEPT VISIT HI MDM: CPT | Mod: 25

## 2019-01-01 PROCEDURE — 25000132 ZZH RX MED GY IP 250 OP 250 PS 637: Performed by: STUDENT IN AN ORGANIZED HEALTH CARE EDUCATION/TRAINING PROGRAM

## 2019-01-01 PROCEDURE — 85025 COMPLETE CBC W/AUTO DIFF WBC: CPT | Performed by: OBSTETRICS & GYNECOLOGY

## 2019-01-01 PROCEDURE — 25000128 H RX IP 250 OP 636: Performed by: STUDENT IN AN ORGANIZED HEALTH CARE EDUCATION/TRAINING PROGRAM

## 2019-01-01 PROCEDURE — 80053 COMPREHEN METABOLIC PANEL: CPT | Performed by: OBSTETRICS & GYNECOLOGY

## 2019-01-01 PROCEDURE — 96413 CHEMO IV INFUSION 1 HR: CPT

## 2019-01-01 PROCEDURE — 25800030 ZZH RX IP 258 OP 636: Performed by: EMERGENCY MEDICINE

## 2019-01-01 PROCEDURE — 96367 TX/PROPH/DG ADDL SEQ IV INF: CPT

## 2019-01-01 PROCEDURE — 99284 EMERGENCY DEPT VISIT MOD MDM: CPT | Mod: 25 | Performed by: EMERGENCY MEDICINE

## 2019-01-01 PROCEDURE — 84100 ASSAY OF PHOSPHORUS: CPT | Performed by: STUDENT IN AN ORGANIZED HEALTH CARE EDUCATION/TRAINING PROGRAM

## 2019-01-01 PROCEDURE — 82565 ASSAY OF CREATININE: CPT | Performed by: INTERNAL MEDICINE

## 2019-01-01 PROCEDURE — 83605 ASSAY OF LACTIC ACID: CPT

## 2019-01-01 PROCEDURE — 96368 THER/DIAG CONCURRENT INF: CPT

## 2019-01-01 PROCEDURE — 0F768DZ DILATION OF LEFT HEPATIC DUCT WITH INTRALUMINAL DEVICE, VIA NATURAL OR ARTIFICIAL OPENING ENDOSCOPIC: ICD-10-PCS | Performed by: INTERNAL MEDICINE

## 2019-01-01 PROCEDURE — 25000132 ZZH RX MED GY IP 250 OP 250 PS 637: Performed by: INTERNAL MEDICINE

## 2019-01-01 PROCEDURE — 25000125 ZZHC RX 250: Performed by: OBSTETRICS & GYNECOLOGY

## 2019-01-01 PROCEDURE — 36415 COLL VENOUS BLD VENIPUNCTURE: CPT | Performed by: OBSTETRICS & GYNECOLOGY

## 2019-01-01 PROCEDURE — 83735 ASSAY OF MAGNESIUM: CPT | Performed by: STUDENT IN AN ORGANIZED HEALTH CARE EDUCATION/TRAINING PROGRAM

## 2019-01-01 PROCEDURE — 85025 COMPLETE CBC W/AUTO DIFF WBC: CPT | Performed by: STUDENT IN AN ORGANIZED HEALTH CARE EDUCATION/TRAINING PROGRAM

## 2019-01-01 PROCEDURE — 40000279 XR SURGERY CARM FLUORO GREATER THAN 5 MIN W STILLS: Mod: TC

## 2019-01-01 PROCEDURE — 25000125 ZZHC RX 250: Performed by: RADIOLOGY

## 2019-01-01 PROCEDURE — 36000061 ZZH SURGERY LEVEL 3 W FLUORO 1ST 30 MIN - UMMC: Performed by: INTERNAL MEDICINE

## 2019-01-01 PROCEDURE — 40000275 ZZH STATISTIC RCP TIME EA 10 MIN

## 2019-01-01 PROCEDURE — 36415 COLL VENOUS BLD VENIPUNCTURE: CPT | Performed by: STUDENT IN AN ORGANIZED HEALTH CARE EDUCATION/TRAINING PROGRAM

## 2019-01-01 PROCEDURE — 36592 COLLECT BLOOD FROM PICC: CPT | Performed by: STUDENT IN AN ORGANIZED HEALTH CARE EDUCATION/TRAINING PROGRAM

## 2019-01-01 PROCEDURE — 36415 COLL VENOUS BLD VENIPUNCTURE: CPT | Performed by: INTERNAL MEDICINE

## 2019-01-01 PROCEDURE — 86900 BLOOD TYPING SEROLOGIC ABO: CPT | Performed by: ANESTHESIOLOGY

## 2019-01-01 PROCEDURE — 25000128 H RX IP 250 OP 636: Performed by: OBSTETRICS & GYNECOLOGY

## 2019-01-01 PROCEDURE — 70552 MRI BRAIN STEM W/DYE: CPT

## 2019-01-01 PROCEDURE — 25000132 ZZH RX MED GY IP 250 OP 250 PS 637: Performed by: OBSTETRICS & GYNECOLOGY

## 2019-01-01 PROCEDURE — 93306 TTE W/DOPPLER COMPLETE: CPT | Mod: 26 | Performed by: INTERNAL MEDICINE

## 2019-01-01 PROCEDURE — 87040 BLOOD CULTURE FOR BACTERIA: CPT | Performed by: EMERGENCY MEDICINE

## 2019-01-01 PROCEDURE — 99223 1ST HOSP IP/OBS HIGH 75: CPT | Mod: GC | Performed by: HOSPITALIST

## 2019-01-01 PROCEDURE — 96360 HYDRATION IV INFUSION INIT: CPT

## 2019-01-01 PROCEDURE — 71000015 ZZH RECOVERY PHASE 1 LEVEL 2 EA ADDTL HR: Performed by: INTERNAL MEDICINE

## 2019-01-01 PROCEDURE — 96365 THER/PROPH/DIAG IV INF INIT: CPT | Mod: 59

## 2019-01-01 PROCEDURE — 25500064 ZZH RX 255 OP 636: Performed by: INTERNAL MEDICINE

## 2019-01-01 PROCEDURE — 83735 ASSAY OF MAGNESIUM: CPT | Performed by: OBSTETRICS & GYNECOLOGY

## 2019-01-01 PROCEDURE — 27211417 ZZ H KIT, VPS RHYTHM STYLET

## 2019-01-01 PROCEDURE — 25000125 ZZHC RX 250: Performed by: INTERNAL MEDICINE

## 2019-01-01 PROCEDURE — 25000128 H RX IP 250 OP 636: Performed by: INTERNAL MEDICINE

## 2019-01-01 PROCEDURE — G0463 HOSPITAL OUTPT CLINIC VISIT: HCPCS | Mod: 25

## 2019-01-01 PROCEDURE — 80048 BASIC METABOLIC PNL TOTAL CA: CPT | Performed by: STUDENT IN AN ORGANIZED HEALTH CARE EDUCATION/TRAINING PROGRAM

## 2019-01-01 PROCEDURE — 84132 ASSAY OF SERUM POTASSIUM: CPT | Performed by: OBSTETRICS & GYNECOLOGY

## 2019-01-01 PROCEDURE — 25000128 H RX IP 250 OP 636: Performed by: NURSE ANESTHETIST, CERTIFIED REGISTERED

## 2019-01-01 PROCEDURE — 77370 RADIATION PHYSICS CONSULT: CPT | Performed by: RADIOLOGY

## 2019-01-01 PROCEDURE — 25800030 ZZH RX IP 258 OP 636: Performed by: STUDENT IN AN ORGANIZED HEALTH CARE EDUCATION/TRAINING PROGRAM

## 2019-01-01 PROCEDURE — 96375 TX/PRO/DX INJ NEW DRUG ADDON: CPT

## 2019-01-01 PROCEDURE — 83735 ASSAY OF MAGNESIUM: CPT | Performed by: NEUROLOGICAL SURGERY

## 2019-01-01 PROCEDURE — 87040 BLOOD CULTURE FOR BACTERIA: CPT | Performed by: STUDENT IN AN ORGANIZED HEALTH CARE EDUCATION/TRAINING PROGRAM

## 2019-01-01 PROCEDURE — 25000132 ZZH RX MED GY IP 250 OP 250 PS 637: Performed by: NURSE PRACTITIONER

## 2019-01-01 PROCEDURE — 83605 ASSAY OF LACTIC ACID: CPT | Performed by: OBSTETRICS & GYNECOLOGY

## 2019-01-01 PROCEDURE — 27210794 ZZH OR GENERAL SUPPLY STERILE: Performed by: NEUROLOGICAL SURGERY

## 2019-01-01 PROCEDURE — 83605 ASSAY OF LACTIC ACID: CPT | Performed by: NEUROLOGICAL SURGERY

## 2019-01-01 PROCEDURE — 86304 IMMUNOASSAY TUMOR CA 125: CPT | Performed by: OBSTETRICS & GYNECOLOGY

## 2019-01-01 PROCEDURE — 25000131 ZZH RX MED GY IP 250 OP 636 PS 637: Performed by: NURSE PRACTITIONER

## 2019-01-01 PROCEDURE — 40000014 ZZH STATISTIC ARTERIAL MONITORING DAILY

## 2019-01-01 PROCEDURE — 74177 CT ABD & PELVIS W/CONTRAST: CPT

## 2019-01-01 PROCEDURE — 80053 COMPREHEN METABOLIC PANEL: CPT | Performed by: NURSE PRACTITIONER

## 2019-01-01 PROCEDURE — 25000132 ZZH RX MED GY IP 250 OP 250 PS 637: Performed by: ANESTHESIOLOGY

## 2019-01-01 PROCEDURE — 36591 DRAW BLOOD OFF VENOUS DEVICE: CPT

## 2019-01-01 PROCEDURE — G0378 HOSPITAL OBSERVATION PER HR: HCPCS

## 2019-01-01 PROCEDURE — 99238 HOSP IP/OBS DSCHRG MGMT 30/<: CPT | Mod: GC | Performed by: OBSTETRICS & GYNECOLOGY

## 2019-01-01 PROCEDURE — 25000125 ZZHC RX 250: Performed by: STUDENT IN AN ORGANIZED HEALTH CARE EDUCATION/TRAINING PROGRAM

## 2019-01-01 PROCEDURE — 25800030 ZZH RX IP 258 OP 636: Performed by: INTERNAL MEDICINE

## 2019-01-01 PROCEDURE — 40000986 XR CHEST PORT 1 VW

## 2019-01-01 PROCEDURE — 94640 AIRWAY INHALATION TREATMENT: CPT

## 2019-01-01 PROCEDURE — 12000001 ZZH R&B MED SURG/OB UMMC

## 2019-01-01 PROCEDURE — 36415 COLL VENOUS BLD VENIPUNCTURE: CPT | Performed by: NEUROLOGICAL SURGERY

## 2019-01-01 PROCEDURE — 25800030 ZZH RX IP 258 OP 636: Performed by: NEUROLOGICAL SURGERY

## 2019-01-01 PROCEDURE — 36415 COLL VENOUS BLD VENIPUNCTURE: CPT | Performed by: NURSE PRACTITIONER

## 2019-01-01 PROCEDURE — 99152 MOD SED SAME PHYS/QHP 5/>YRS: CPT

## 2019-01-01 PROCEDURE — 80053 COMPREHEN METABOLIC PANEL: CPT | Performed by: STUDENT IN AN ORGANIZED HEALTH CARE EDUCATION/TRAINING PROGRAM

## 2019-01-01 PROCEDURE — 71260 CT THORAX DX C+: CPT

## 2019-01-01 PROCEDURE — 77373 STRTCTC BDY RAD THER TX DLVR: CPT | Performed by: RADIOLOGY

## 2019-01-01 PROCEDURE — 77334 RADIATION TREATMENT AID(S): CPT | Performed by: RADIOLOGY

## 2019-01-01 PROCEDURE — 25000128 H RX IP 250 OP 636: Performed by: NURSE PRACTITIONER

## 2019-01-01 PROCEDURE — 99285 EMERGENCY DEPT VISIT HI MDM: CPT | Mod: 25 | Performed by: EMERGENCY MEDICINE

## 2019-01-01 PROCEDURE — 84100 ASSAY OF PHOSPHORUS: CPT | Performed by: ANESTHESIOLOGY

## 2019-01-01 PROCEDURE — 96365 THER/PROPH/DIAG IV INF INIT: CPT

## 2019-01-01 PROCEDURE — 71000014 ZZH RECOVERY PHASE 1 LEVEL 2 FIRST HR: Performed by: INTERNAL MEDICINE

## 2019-01-01 PROCEDURE — 25000125 ZZHC RX 250: Performed by: PHYSICIAN ASSISTANT

## 2019-01-01 PROCEDURE — 99233 SBSQ HOSP IP/OBS HIGH 50: CPT | Mod: GC | Performed by: OBSTETRICS & GYNECOLOGY

## 2019-01-01 PROCEDURE — 40000170 ZZH STATISTIC PRE-PROCEDURE ASSESSMENT II: Performed by: INTERNAL MEDICINE

## 2019-01-01 PROCEDURE — 85025 COMPLETE CBC W/AUTO DIFF WBC: CPT | Performed by: NURSE PRACTITIONER

## 2019-01-01 PROCEDURE — 81001 URINALYSIS AUTO W/SCOPE: CPT | Performed by: INTERNAL MEDICINE

## 2019-01-01 PROCEDURE — 25800030 ZZH RX IP 258 OP 636: Performed by: ANESTHESIOLOGY

## 2019-01-01 PROCEDURE — 97116 GAIT TRAINING THERAPY: CPT | Mod: GP | Performed by: REHABILITATION PRACTITIONER

## 2019-01-01 PROCEDURE — 80048 BASIC METABOLIC PNL TOTAL CA: CPT | Performed by: NEUROLOGICAL SURGERY

## 2019-01-01 PROCEDURE — 25500064 ZZH RX 255 OP 636: Performed by: RADIOLOGY

## 2019-01-01 PROCEDURE — 83690 ASSAY OF LIPASE: CPT | Performed by: INTERNAL MEDICINE

## 2019-01-01 PROCEDURE — 99214 OFFICE O/P EST MOD 30 MIN: CPT | Performed by: NURSE PRACTITIONER

## 2019-01-01 PROCEDURE — 27210190 US THORACENTESIS

## 2019-01-01 PROCEDURE — G0463 HOSPITAL OUTPT CLINIC VISIT: HCPCS | Mod: ZF

## 2019-01-01 PROCEDURE — 84484 ASSAY OF TROPONIN QUANT: CPT | Performed by: INTERNAL MEDICINE

## 2019-01-01 PROCEDURE — 87086 URINE CULTURE/COLONY COUNT: CPT | Performed by: EMERGENCY MEDICINE

## 2019-01-01 PROCEDURE — 96366 THER/PROPH/DIAG IV INF ADDON: CPT

## 2019-01-01 PROCEDURE — 27810169 ZZH OR IMPLANT GENERAL: Performed by: NEUROLOGICAL SURGERY

## 2019-01-01 PROCEDURE — 85610 PROTHROMBIN TIME: CPT | Performed by: STUDENT IN AN ORGANIZED HEALTH CARE EDUCATION/TRAINING PROGRAM

## 2019-01-01 PROCEDURE — 70450 CT HEAD/BRAIN W/O DYE: CPT

## 2019-01-01 PROCEDURE — 83735 ASSAY OF MAGNESIUM: CPT | Performed by: NURSE PRACTITIONER

## 2019-01-01 PROCEDURE — 40000893 ZZH STATISTIC PT IP EVAL DEFER

## 2019-01-01 PROCEDURE — 76705 ECHO EXAM OF ABDOMEN: CPT

## 2019-01-01 PROCEDURE — 84100 ASSAY OF PHOSPHORUS: CPT | Performed by: OBSTETRICS & GYNECOLOGY

## 2019-01-01 PROCEDURE — 36000059 ZZH SURGERY LEVEL 3 EA 15 ADDTL MIN UMMC: Performed by: INTERNAL MEDICINE

## 2019-01-01 PROCEDURE — 25000128 H RX IP 250 OP 636: Performed by: ANESTHESIOLOGY

## 2019-01-01 PROCEDURE — 25800030 ZZH RX IP 258 OP 636: Performed by: NURSE ANESTHETIST, CERTIFIED REGISTERED

## 2019-01-01 PROCEDURE — 85049 AUTOMATED PLATELET COUNT: CPT | Performed by: STUDENT IN AN ORGANIZED HEALTH CARE EDUCATION/TRAINING PROGRAM

## 2019-01-01 PROCEDURE — 36415 COLL VENOUS BLD VENIPUNCTURE: CPT

## 2019-01-01 PROCEDURE — 32555 ASPIRATE PLEURA W/ IMAGING: CPT | Performed by: INTERNAL MEDICINE

## 2019-01-01 PROCEDURE — 83735 ASSAY OF MAGNESIUM: CPT | Performed by: EMERGENCY MEDICINE

## 2019-01-01 PROCEDURE — 80048 BASIC METABOLIC PNL TOTAL CA: CPT | Performed by: EMERGENCY MEDICINE

## 2019-01-01 PROCEDURE — 0JPT3WZ REMOVAL OF TOTALLY IMPLANTABLE VASCULAR ACCESS DEVICE FROM TRUNK SUBCUTANEOUS TISSUE AND FASCIA, PERCUTANEOUS APPROACH: ICD-10-PCS | Performed by: RADIOLOGY

## 2019-01-01 PROCEDURE — 36590 REMOVAL TUNNELED CV CATH: CPT

## 2019-01-01 PROCEDURE — 80048 BASIC METABOLIC PNL TOTAL CA: CPT | Performed by: OBSTETRICS & GYNECOLOGY

## 2019-01-01 PROCEDURE — 32555 ASPIRATE PLEURA W/ IMAGING: CPT

## 2019-01-01 PROCEDURE — 40000986 XR CHEST 1 VW

## 2019-01-01 PROCEDURE — 96361 HYDRATE IV INFUSION ADD-ON: CPT

## 2019-01-01 PROCEDURE — 25000125 ZZHC RX 250: Performed by: ANESTHESIOLOGY

## 2019-01-01 PROCEDURE — 37000009 ZZH ANESTHESIA TECHNICAL FEE, EACH ADDTL 15 MIN: Performed by: UROLOGY

## 2019-01-01 PROCEDURE — 0F778DZ DILATION OF COMMON HEPATIC DUCT WITH INTRALUMINAL DEVICE, VIA NATURAL OR ARTIFICIAL OPENING ENDOSCOPIC: ICD-10-PCS | Performed by: INTERNAL MEDICINE

## 2019-01-01 PROCEDURE — 37000009 ZZH ANESTHESIA TECHNICAL FEE, EACH ADDTL 15 MIN: Performed by: INTERNAL MEDICINE

## 2019-01-01 PROCEDURE — 97535 SELF CARE MNGMENT TRAINING: CPT | Mod: GO | Performed by: OCCUPATIONAL THERAPIST

## 2019-01-01 PROCEDURE — 99285 EMERGENCY DEPT VISIT HI MDM: CPT | Mod: 25,27 | Performed by: EMERGENCY MEDICINE

## 2019-01-01 PROCEDURE — 27210794 ZZH OR GENERAL SUPPLY STERILE: Performed by: UROLOGY

## 2019-01-01 PROCEDURE — 40000274 ZZH STATISTIC RCP CONSULT EA 30 MIN

## 2019-01-01 PROCEDURE — 82803 BLOOD GASES ANY COMBINATION: CPT

## 2019-01-01 PROCEDURE — 85025 COMPLETE CBC W/AUTO DIFF WBC: CPT | Performed by: EMERGENCY MEDICINE

## 2019-01-01 PROCEDURE — 93005 ELECTROCARDIOGRAM TRACING: CPT | Performed by: EMERGENCY MEDICINE

## 2019-01-01 PROCEDURE — 83605 ASSAY OF LACTIC ACID: CPT | Performed by: EMERGENCY MEDICINE

## 2019-01-01 PROCEDURE — C1763 CONN TISS, NON-HUMAN: HCPCS | Performed by: NEUROLOGICAL SURGERY

## 2019-01-01 PROCEDURE — 40000277 XR SURGERY CARM FLUORO LESS THAN 5 MIN W STILLS: Mod: TC

## 2019-01-01 PROCEDURE — 85025 COMPLETE CBC W/AUTO DIFF WBC: CPT | Performed by: INTERNAL MEDICINE

## 2019-01-01 PROCEDURE — 25500064 ZZH RX 255 OP 636: Performed by: OBSTETRICS & GYNECOLOGY

## 2019-01-01 PROCEDURE — 99232 SBSQ HOSP IP/OBS MODERATE 35: CPT | Mod: GC | Performed by: OBSTETRICS & GYNECOLOGY

## 2019-01-01 PROCEDURE — 94640 AIRWAY INHALATION TREATMENT: CPT | Mod: 76

## 2019-01-01 PROCEDURE — 99239 HOSP IP/OBS DSCHRG MGMT >30: CPT | Performed by: INTERNAL MEDICINE

## 2019-01-01 PROCEDURE — 86850 RBC ANTIBODY SCREEN: CPT | Performed by: ANESTHESIOLOGY

## 2019-01-01 PROCEDURE — 80053 COMPREHEN METABOLIC PANEL: CPT | Performed by: ANESTHESIOLOGY

## 2019-01-01 PROCEDURE — 84100 ASSAY OF PHOSPHORUS: CPT | Performed by: NURSE PRACTITIONER

## 2019-01-01 PROCEDURE — 36592 COLLECT BLOOD FROM PICC: CPT

## 2019-01-01 PROCEDURE — 86901 BLOOD TYPING SEROLOGIC RH(D): CPT | Performed by: ANESTHESIOLOGY

## 2019-01-01 PROCEDURE — 84132 ASSAY OF SERUM POTASSIUM: CPT | Performed by: STUDENT IN AN ORGANIZED HEALTH CARE EDUCATION/TRAINING PROGRAM

## 2019-01-01 PROCEDURE — 84100 ASSAY OF PHOSPHORUS: CPT | Performed by: EMERGENCY MEDICINE

## 2019-01-01 PROCEDURE — 25800030 ZZH RX IP 258 OP 636: Performed by: RADIOLOGY

## 2019-01-01 PROCEDURE — 87077 CULTURE AEROBIC IDENTIFY: CPT | Performed by: EMERGENCY MEDICINE

## 2019-01-01 PROCEDURE — 36592 COLLECT BLOOD FROM PICC: CPT | Performed by: OBSTETRICS & GYNECOLOGY

## 2019-01-01 PROCEDURE — 87641 MR-STAPH DNA AMP PROBE: CPT | Performed by: NEUROLOGICAL SURGERY

## 2019-01-01 PROCEDURE — A9585 GADOBUTROL INJECTION: HCPCS | Performed by: NEUROLOGICAL SURGERY

## 2019-01-01 PROCEDURE — 99215 OFFICE O/P EST HI 40 MIN: CPT | Mod: ZP | Performed by: OBSTETRICS & GYNECOLOGY

## 2019-01-01 PROCEDURE — 87186 SC STD MICRODIL/AGAR DIL: CPT | Performed by: EMERGENCY MEDICINE

## 2019-01-01 PROCEDURE — 25000128 H RX IP 250 OP 636: Performed by: EMERGENCY MEDICINE

## 2019-01-01 PROCEDURE — 85652 RBC SED RATE AUTOMATED: CPT | Performed by: INTERNAL MEDICINE

## 2019-01-01 PROCEDURE — 99233 SBSQ HOSP IP/OBS HIGH 50: CPT | Performed by: HOSPITALIST

## 2019-01-01 PROCEDURE — 87040 BLOOD CULTURE FOR BACTERIA: CPT | Performed by: INTERNAL MEDICINE

## 2019-01-01 PROCEDURE — 87086 URINE CULTURE/COLONY COUNT: CPT | Performed by: INTERNAL MEDICINE

## 2019-01-01 PROCEDURE — 87493 C DIFF AMPLIFIED PROBE: CPT | Performed by: NURSE PRACTITIONER

## 2019-01-01 PROCEDURE — A9585 GADOBUTROL INJECTION: HCPCS | Performed by: RADIOLOGY

## 2019-01-01 PROCEDURE — 99221 1ST HOSP IP/OBS SF/LOW 40: CPT | Mod: AI | Performed by: OBSTETRICS & GYNECOLOGY

## 2019-01-01 PROCEDURE — 34300033 ZZH RX 343: Performed by: OBSTETRICS & GYNECOLOGY

## 2019-01-01 PROCEDURE — 82962 GLUCOSE BLOOD TEST: CPT

## 2019-01-01 PROCEDURE — 71275 CT ANGIOGRAPHY CHEST: CPT

## 2019-01-01 PROCEDURE — 93005 ELECTROCARDIOGRAM TRACING: CPT

## 2019-01-01 PROCEDURE — 88307 TISSUE EXAM BY PATHOLOGIST: CPT | Performed by: NEUROLOGICAL SURGERY

## 2019-01-01 PROCEDURE — 82248 BILIRUBIN DIRECT: CPT | Performed by: EMERGENCY MEDICINE

## 2019-01-01 PROCEDURE — 78226 HEPATOBILIARY SYSTEM IMAGING: CPT

## 2019-01-01 PROCEDURE — 81001 URINALYSIS AUTO W/SCOPE: CPT | Performed by: UROLOGY

## 2019-01-01 PROCEDURE — 37000008 ZZH ANESTHESIA TECHNICAL FEE, 1ST 30 MIN: Performed by: INTERNAL MEDICINE

## 2019-01-01 PROCEDURE — 85730 THROMBOPLASTIN TIME PARTIAL: CPT | Performed by: STUDENT IN AN ORGANIZED HEALTH CARE EDUCATION/TRAINING PROGRAM

## 2019-01-01 PROCEDURE — C1877 STENT, NON-COAT/COV W/O DEL: HCPCS | Performed by: INTERNAL MEDICINE

## 2019-01-01 PROCEDURE — 40000354 US MARKINGS: Mod: TC

## 2019-01-01 PROCEDURE — 81001 URINALYSIS AUTO W/SCOPE: CPT | Performed by: STUDENT IN AN ORGANIZED HEALTH CARE EDUCATION/TRAINING PROGRAM

## 2019-01-01 PROCEDURE — 80053 COMPREHEN METABOLIC PANEL: CPT | Performed by: INTERNAL MEDICINE

## 2019-01-01 PROCEDURE — 87086 URINE CULTURE/COLONY COUNT: CPT | Performed by: STUDENT IN AN ORGANIZED HEALTH CARE EDUCATION/TRAINING PROGRAM

## 2019-01-01 PROCEDURE — 77336 RADIATION PHYSICS CONSULT: CPT | Performed by: RADIOLOGY

## 2019-01-01 PROCEDURE — 85730 THROMBOPLASTIN TIME PARTIAL: CPT | Performed by: EMERGENCY MEDICINE

## 2019-01-01 PROCEDURE — C1751 CATH, INF, PER/CENT/MIDLINE: HCPCS

## 2019-01-01 PROCEDURE — 81001 URINALYSIS AUTO W/SCOPE: CPT | Performed by: NURSE PRACTITIONER

## 2019-01-01 PROCEDURE — 00000146 ZZHCL STATISTIC GLUCOSE BY METER IP

## 2019-01-01 PROCEDURE — 83735 ASSAY OF MAGNESIUM: CPT | Performed by: INTERNAL MEDICINE

## 2019-01-01 PROCEDURE — 84132 ASSAY OF SERUM POTASSIUM: CPT | Performed by: NURSE PRACTITIONER

## 2019-01-01 PROCEDURE — 25800030 ZZH RX IP 258 OP 636: Performed by: NURSE PRACTITIONER

## 2019-01-01 PROCEDURE — G0463 HOSPITAL OUTPT CLINIC VISIT: HCPCS | Mod: 25,ZF

## 2019-01-01 PROCEDURE — 74019 RADEX ABDOMEN 2 VIEWS: CPT

## 2019-01-01 PROCEDURE — 88331 PATH CONSLTJ SURG 1 BLK 1SPC: CPT | Performed by: NEUROLOGICAL SURGERY

## 2019-01-01 PROCEDURE — 85610 PROTHROMBIN TIME: CPT | Performed by: EMERGENCY MEDICINE

## 2019-01-01 PROCEDURE — 25800030 ZZH RX IP 258 OP 636: Performed by: PHYSICIAN ASSISTANT

## 2019-01-01 PROCEDURE — 40000863 ZZH STATISTIC RADIOLOGY XRAY, US, CT, MAR, NM

## 2019-01-01 PROCEDURE — 71046 X-RAY EXAM CHEST 2 VIEWS: CPT

## 2019-01-01 PROCEDURE — 93321 DOPPLER ECHO F-UP/LMTD STD: CPT | Mod: 26 | Performed by: INTERNAL MEDICINE

## 2019-01-01 PROCEDURE — 87040 BLOOD CULTURE FOR BACTERIA: CPT | Performed by: NEUROLOGICAL SURGERY

## 2019-01-01 PROCEDURE — 36415 COLL VENOUS BLD VENIPUNCTURE: CPT | Performed by: ANESTHESIOLOGY

## 2019-01-01 PROCEDURE — 85027 COMPLETE CBC AUTOMATED: CPT | Performed by: INTERNAL MEDICINE

## 2019-01-01 PROCEDURE — 25000131 ZZH RX MED GY IP 250 OP 636 PS 637: Performed by: STUDENT IN AN ORGANIZED HEALTH CARE EDUCATION/TRAINING PROGRAM

## 2019-01-01 PROCEDURE — C1769 GUIDE WIRE: HCPCS | Performed by: INTERNAL MEDICINE

## 2019-01-01 PROCEDURE — 12000000 ZZH R&B MED SURG/OB

## 2019-01-01 PROCEDURE — 25000125 ZZHC RX 250: Performed by: NURSE ANESTHETIST, CERTIFIED REGISTERED

## 2019-01-01 PROCEDURE — 40000141 ZZH STATISTIC PERIPHERAL IV START W/O US GUIDANCE

## 2019-01-01 PROCEDURE — 96365 THER/PROPH/DIAG IV INF INIT: CPT | Mod: 59 | Performed by: EMERGENCY MEDICINE

## 2019-01-01 PROCEDURE — 96368 THER/DIAG CONCURRENT INF: CPT | Performed by: EMERGENCY MEDICINE

## 2019-01-01 PROCEDURE — 27211024 ZZHC OR SUPPLY OTHER OPNP: Performed by: INTERNAL MEDICINE

## 2019-01-01 PROCEDURE — C9113 INJ PANTOPRAZOLE SODIUM, VIA: HCPCS | Performed by: STUDENT IN AN ORGANIZED HEALTH CARE EDUCATION/TRAINING PROGRAM

## 2019-01-01 PROCEDURE — 84100 ASSAY OF PHOSPHORUS: CPT | Performed by: NEUROLOGICAL SURGERY

## 2019-01-01 PROCEDURE — 84145 PROCALCITONIN (PCT): CPT | Performed by: EMERGENCY MEDICINE

## 2019-01-01 PROCEDURE — 97802 MEDICAL NUTRITION INDIV IN: CPT | Performed by: DIETITIAN, REGISTERED

## 2019-01-01 PROCEDURE — 25800030 ZZH RX IP 258 OP 636: Mod: ZF | Performed by: NURSE PRACTITIONER

## 2019-01-01 PROCEDURE — 99222 1ST HOSP IP/OBS MODERATE 55: CPT | Performed by: INTERNAL MEDICINE

## 2019-01-01 PROCEDURE — 99283 EMERGENCY DEPT VISIT LOW MDM: CPT | Mod: 25

## 2019-01-01 PROCEDURE — 25000566 ZZH SEVOFLURANE, EA 15 MIN: Performed by: INTERNAL MEDICINE

## 2019-01-01 PROCEDURE — 85027 COMPLETE CBC AUTOMATED: CPT | Performed by: STUDENT IN AN ORGANIZED HEALTH CARE EDUCATION/TRAINING PROGRAM

## 2019-01-01 PROCEDURE — 96361 HYDRATE IV INFUSION ADD-ON: CPT | Performed by: EMERGENCY MEDICINE

## 2019-01-01 PROCEDURE — 36415 COLL VENOUS BLD VENIPUNCTURE: CPT | Performed by: EMERGENCY MEDICINE

## 2019-01-01 PROCEDURE — 85018 HEMOGLOBIN: CPT | Performed by: ANESTHESIOLOGY

## 2019-01-01 PROCEDURE — 89051 BODY FLUID CELL COUNT: CPT | Performed by: STUDENT IN AN ORGANIZED HEALTH CARE EDUCATION/TRAINING PROGRAM

## 2019-01-01 PROCEDURE — 83735 ASSAY OF MAGNESIUM: CPT | Performed by: ANESTHESIOLOGY

## 2019-01-01 PROCEDURE — 84100 ASSAY OF PHOSPHORUS: CPT | Performed by: INTERNAL MEDICINE

## 2019-01-01 PROCEDURE — 36000055 ZZH SURGERY LEVEL 2 W FLUORO 1ST 30 MIN - UMMC: Performed by: UROLOGY

## 2019-01-01 PROCEDURE — A9585 GADOBUTROL INJECTION: HCPCS | Performed by: OBSTETRICS & GYNECOLOGY

## 2019-01-01 PROCEDURE — 77290 THER RAD SIMULAJ FIELD CPLX: CPT | Performed by: RADIOLOGY

## 2019-01-01 PROCEDURE — C1726 CATH, BAL DIL, NON-VASCULAR: HCPCS | Performed by: INTERNAL MEDICINE

## 2019-01-01 PROCEDURE — 40000170 ZZH STATISTIC PRE-PROCEDURE ASSESSMENT II: Performed by: UROLOGY

## 2019-01-01 PROCEDURE — 71045 X-RAY EXAM CHEST 1 VIEW: CPT | Mod: 59

## 2019-01-01 PROCEDURE — 84484 ASSAY OF TROPONIN QUANT: CPT | Performed by: EMERGENCY MEDICINE

## 2019-01-01 PROCEDURE — 25500064 ZZH RX 255 OP 636: Performed by: UROLOGY

## 2019-01-01 PROCEDURE — 99215 OFFICE O/P EST HI 40 MIN: CPT | Performed by: NURSE PRACTITIONER

## 2019-01-01 PROCEDURE — 25000125 ZZHC RX 250: Performed by: EMERGENCY MEDICINE

## 2019-01-01 PROCEDURE — C2617 STENT, NON-COR, TEM W/O DEL: HCPCS | Performed by: UROLOGY

## 2019-01-01 PROCEDURE — 40000170 ZZH STATISTIC PRE-PROCEDURE ASSESSMENT II: Performed by: NEUROLOGICAL SURGERY

## 2019-01-01 PROCEDURE — 70491 CT SOFT TISSUE NECK W/DYE: CPT

## 2019-01-01 PROCEDURE — 82150 ASSAY OF AMYLASE: CPT | Performed by: INTERNAL MEDICINE

## 2019-01-01 PROCEDURE — 85027 COMPLETE CBC AUTOMATED: CPT | Performed by: NEUROLOGICAL SURGERY

## 2019-01-01 PROCEDURE — 86304 IMMUNOASSAY TUMOR CA 125: CPT | Performed by: NURSE PRACTITIONER

## 2019-01-01 PROCEDURE — 25000128 H RX IP 250 OP 636: Performed by: NEUROLOGICAL SURGERY

## 2019-01-01 PROCEDURE — 77295 3-D RADIOTHERAPY PLAN: CPT | Performed by: RADIOLOGY

## 2019-01-01 PROCEDURE — 83690 ASSAY OF LIPASE: CPT | Performed by: EMERGENCY MEDICINE

## 2019-01-01 PROCEDURE — P9041 ALBUMIN (HUMAN),5%, 50ML: HCPCS | Performed by: NURSE ANESTHETIST, CERTIFIED REGISTERED

## 2019-01-01 PROCEDURE — 71000014 ZZH RECOVERY PHASE 1 LEVEL 2 FIRST HR: Performed by: NEUROLOGICAL SURGERY

## 2019-01-01 PROCEDURE — 00B00ZZ EXCISION OF BRAIN, OPEN APPROACH: ICD-10-PCS | Performed by: NEUROLOGICAL SURGERY

## 2019-01-01 PROCEDURE — 8E09XBG COMPUTER ASSISTED PROCEDURE OF HEAD AND NECK REGION, WITH COMPUTERIZED TOMOGRAPHY: ICD-10-PCS | Performed by: NEUROLOGICAL SURGERY

## 2019-01-01 PROCEDURE — 77470 SPECIAL RADIATION TREATMENT: CPT | Performed by: RADIOLOGY

## 2019-01-01 PROCEDURE — 99233 SBSQ HOSP IP/OBS HIGH 50: CPT | Mod: GC | Performed by: HOSPITALIST

## 2019-01-01 PROCEDURE — 36592 COLLECT BLOOD FROM PICC: CPT | Performed by: NEUROLOGICAL SURGERY

## 2019-01-01 PROCEDURE — 0F758DZ DILATION OF RIGHT HEPATIC DUCT WITH INTRALUMINAL DEVICE, VIA NATURAL OR ARTIFICIAL OPENING ENDOSCOPIC: ICD-10-PCS | Performed by: INTERNAL MEDICINE

## 2019-01-01 PROCEDURE — 25000125 ZZHC RX 250: Performed by: NURSE PRACTITIONER

## 2019-01-01 PROCEDURE — 89051 BODY FLUID CELL COUNT: CPT | Performed by: INTERNAL MEDICINE

## 2019-01-01 PROCEDURE — 00000155 ZZHCL STATISTIC H-CELL BLOCK W/STAIN: Performed by: STUDENT IN AN ORGANIZED HEALTH CARE EDUCATION/TRAINING PROGRAM

## 2019-01-01 PROCEDURE — 88112 CYTOPATH CELL ENHANCE TECH: CPT | Performed by: STUDENT IN AN ORGANIZED HEALTH CARE EDUCATION/TRAINING PROGRAM

## 2019-01-01 PROCEDURE — 27210794 ZZH OR GENERAL SUPPLY STERILE: Performed by: INTERNAL MEDICINE

## 2019-01-01 PROCEDURE — 20000004 ZZH R&B ICU UMMC

## 2019-01-01 PROCEDURE — 99214 OFFICE O/P EST MOD 30 MIN: CPT | Performed by: OBSTETRICS & GYNECOLOGY

## 2019-01-01 PROCEDURE — 87040 BLOOD CULTURE FOR BACTERIA: CPT | Performed by: OBSTETRICS & GYNECOLOGY

## 2019-01-01 PROCEDURE — 36000076 ZZH SURGERY LEVEL 6 EA 15 ADDTL MIN - UMMC: Performed by: NEUROLOGICAL SURGERY

## 2019-01-01 PROCEDURE — 27210190 US PARACENTESIS

## 2019-01-01 PROCEDURE — 36592 COLLECT BLOOD FROM PICC: CPT | Performed by: NURSE PRACTITIONER

## 2019-01-01 PROCEDURE — 87086 URINE CULTURE/COLONY COUNT: CPT | Performed by: OBSTETRICS & GYNECOLOGY

## 2019-01-01 PROCEDURE — 81001 URINALYSIS AUTO W/SCOPE: CPT | Performed by: EMERGENCY MEDICINE

## 2019-01-01 PROCEDURE — 00000102 ZZHCL STATISTIC CYTO WRIGHT STAIN TC: Performed by: STUDENT IN AN ORGANIZED HEALTH CARE EDUCATION/TRAINING PROGRAM

## 2019-01-01 PROCEDURE — 84132 ASSAY OF SERUM POTASSIUM: CPT | Performed by: INTERNAL MEDICINE

## 2019-01-01 PROCEDURE — 70553 MRI BRAIN STEM W/O & W/DYE: CPT

## 2019-01-01 PROCEDURE — 80053 COMPREHEN METABOLIC PANEL: CPT | Performed by: EMERGENCY MEDICINE

## 2019-01-01 PROCEDURE — 99223 1ST HOSP IP/OBS HIGH 75: CPT | Mod: AI | Performed by: INTERNAL MEDICINE

## 2019-01-01 PROCEDURE — 86140 C-REACTIVE PROTEIN: CPT | Performed by: INTERNAL MEDICINE

## 2019-01-01 PROCEDURE — 87088 URINE BACTERIA CULTURE: CPT | Performed by: EMERGENCY MEDICINE

## 2019-01-01 PROCEDURE — 25000128 H RX IP 250 OP 636: Performed by: RADIOLOGY

## 2019-01-01 PROCEDURE — 25000125 ZZHC RX 250: Performed by: UROLOGY

## 2019-01-01 PROCEDURE — P9047 ALBUMIN (HUMAN), 25%, 50ML: HCPCS | Performed by: ANESTHESIOLOGY

## 2019-01-01 PROCEDURE — 84450 TRANSFERASE (AST) (SGOT): CPT | Performed by: INTERNAL MEDICINE

## 2019-01-01 PROCEDURE — C9113 INJ PANTOPRAZOLE SODIUM, VIA: HCPCS | Performed by: EMERGENCY MEDICINE

## 2019-01-01 PROCEDURE — C1713 ANCHOR/SCREW BN/BN,TIS/BN: HCPCS | Performed by: NEUROLOGICAL SURGERY

## 2019-01-01 PROCEDURE — 27210995 ZZH RX 272: Performed by: NEUROLOGICAL SURGERY

## 2019-01-01 PROCEDURE — 37000008 ZZH ANESTHESIA TECHNICAL FEE, 1ST 30 MIN: Performed by: UROLOGY

## 2019-01-01 PROCEDURE — 87205 SMEAR GRAM STAIN: CPT | Performed by: INTERNAL MEDICINE

## 2019-01-01 PROCEDURE — 02PY33Z REMOVAL OF INFUSION DEVICE FROM GREAT VESSEL, PERCUTANEOUS APPROACH: ICD-10-PCS | Performed by: RADIOLOGY

## 2019-01-01 PROCEDURE — 25000125 ZZHC RX 250

## 2019-01-01 PROCEDURE — 85384 FIBRINOGEN ACTIVITY: CPT | Performed by: STUDENT IN AN ORGANIZED HEALTH CARE EDUCATION/TRAINING PROGRAM

## 2019-01-01 PROCEDURE — 25000132 ZZH RX MED GY IP 250 OP 250 PS 637: Performed by: EMERGENCY MEDICINE

## 2019-01-01 PROCEDURE — 25000566 ZZH SEVOFLURANE, EA 15 MIN: Performed by: NEUROLOGICAL SURGERY

## 2019-01-01 PROCEDURE — 93308 TTE F-UP OR LMTD: CPT

## 2019-01-01 PROCEDURE — 40000556 ZZH STATISTIC PERIPHERAL IV START W US GUIDANCE

## 2019-01-01 PROCEDURE — 84520 ASSAY OF UREA NITROGEN: CPT | Performed by: INTERNAL MEDICINE

## 2019-01-01 PROCEDURE — 93010 ELECTROCARDIOGRAM REPORT: CPT | Mod: Z6 | Performed by: EMERGENCY MEDICINE

## 2019-01-01 PROCEDURE — A9537 TC99M MEBROFENIN: HCPCS | Performed by: OBSTETRICS & GYNECOLOGY

## 2019-01-01 PROCEDURE — 88305 TISSUE EXAM BY PATHOLOGIST: CPT | Performed by: STUDENT IN AN ORGANIZED HEALTH CARE EDUCATION/TRAINING PROGRAM

## 2019-01-01 PROCEDURE — 37000008 ZZH ANESTHESIA TECHNICAL FEE, 1ST 30 MIN: Performed by: NEUROLOGICAL SURGERY

## 2019-01-01 PROCEDURE — 76770 US EXAM ABDO BACK WALL COMP: CPT

## 2019-01-01 PROCEDURE — 99214 OFFICE O/P EST MOD 30 MIN: CPT | Mod: GC | Performed by: EMERGENCY MEDICINE

## 2019-01-01 PROCEDURE — 97161 PT EVAL LOW COMPLEX 20 MIN: CPT | Mod: GP | Performed by: REHABILITATION PRACTITIONER

## 2019-01-01 PROCEDURE — G0463 HOSPITAL OUTPT CLINIC VISIT: HCPCS

## 2019-01-01 PROCEDURE — 96375 TX/PRO/DX INJ NEW DRUG ADDON: CPT | Performed by: EMERGENCY MEDICINE

## 2019-01-01 PROCEDURE — C1769 GUIDE WIRE: HCPCS | Performed by: UROLOGY

## 2019-01-01 PROCEDURE — 87640 STAPH A DNA AMP PROBE: CPT | Performed by: NEUROLOGICAL SURGERY

## 2019-01-01 PROCEDURE — 99215 OFFICE O/P EST HI 40 MIN: CPT | Performed by: HOSPITALIST

## 2019-01-01 PROCEDURE — 80048 BASIC METABOLIC PNL TOTAL CA: CPT | Performed by: INTERNAL MEDICINE

## 2019-01-01 PROCEDURE — 32555 ASPIRATE PLEURA W/ IMAGING: CPT | Performed by: STUDENT IN AN ORGANIZED HEALTH CARE EDUCATION/TRAINING PROGRAM

## 2019-01-01 PROCEDURE — 85027 COMPLETE CBC AUTOMATED: CPT | Performed by: OBSTETRICS & GYNECOLOGY

## 2019-01-01 PROCEDURE — 99207 ZZC DOWN CODE DUE TO INITIAL EXAM: CPT | Performed by: HOSPITALIST

## 2019-01-01 PROCEDURE — 83605 ASSAY OF LACTIC ACID: CPT | Performed by: INTERNAL MEDICINE

## 2019-01-01 PROCEDURE — 32550 INSERT PLEURAL CATH: CPT

## 2019-01-01 PROCEDURE — 87800 DETECT AGNT MULT DNA DIREC: CPT | Performed by: EMERGENCY MEDICINE

## 2019-01-01 PROCEDURE — P9041 ALBUMIN (HUMAN),5%, 50ML: HCPCS | Performed by: ANESTHESIOLOGY

## 2019-01-01 PROCEDURE — 93308 TTE F-UP OR LMTD: CPT | Mod: 26 | Performed by: INTERNAL MEDICINE

## 2019-01-01 PROCEDURE — 99284 EMERGENCY DEPT VISIT MOD MDM: CPT | Mod: Z6 | Performed by: EMERGENCY MEDICINE

## 2019-01-01 PROCEDURE — 93970 EXTREMITY STUDY: CPT

## 2019-01-01 PROCEDURE — 99232 SBSQ HOSP IP/OBS MODERATE 35: CPT | Performed by: INTERNAL MEDICINE

## 2019-01-01 PROCEDURE — 97530 THERAPEUTIC ACTIVITIES: CPT | Mod: GP | Performed by: REHABILITATION PRACTITIONER

## 2019-01-01 PROCEDURE — C1876 STENT, NON-COA/NON-COV W/DEL: HCPCS | Performed by: INTERNAL MEDICINE

## 2019-01-01 PROCEDURE — 25000125 ZZHC RX 250: Performed by: NEUROLOGICAL SURGERY

## 2019-01-01 PROCEDURE — 87015 SPECIMEN INFECT AGNT CONCNTJ: CPT | Performed by: INTERNAL MEDICINE

## 2019-01-01 PROCEDURE — 36569 INSJ PICC 5 YR+ W/O IMAGING: CPT

## 2019-01-01 PROCEDURE — 93306 TTE W/DOPPLER COMPLETE: CPT

## 2019-01-01 PROCEDURE — 25500064 ZZH RX 255 OP 636: Performed by: NEUROLOGICAL SURGERY

## 2019-01-01 PROCEDURE — 71000027 ZZH RECOVERY PHASE 2 EACH 15 MINS: Performed by: UROLOGY

## 2019-01-01 PROCEDURE — 37000009 ZZH ANESTHESIA TECHNICAL FEE, EACH ADDTL 15 MIN: Performed by: NEUROLOGICAL SURGERY

## 2019-01-01 PROCEDURE — 97165 OT EVAL LOW COMPLEX 30 MIN: CPT | Mod: GO | Performed by: OCCUPATIONAL THERAPIST

## 2019-01-01 PROCEDURE — 36000074 ZZH SURGERY LEVEL 6 1ST 30 MIN - UMMC: Performed by: NEUROLOGICAL SURGERY

## 2019-01-01 PROCEDURE — 0FPB8DZ REMOVAL OF INTRALUMINAL DEVICE FROM HEPATOBILIARY DUCT, VIA NATURAL OR ARTIFICIAL OPENING ENDOSCOPIC: ICD-10-PCS | Performed by: INTERNAL MEDICINE

## 2019-01-01 PROCEDURE — 36000053 ZZH SURGERY LEVEL 2 EA 15 ADDTL MIN - UMMC: Performed by: UROLOGY

## 2019-01-01 PROCEDURE — 77300 RADIATION THERAPY DOSE PLAN: CPT | Performed by: RADIOLOGY

## 2019-01-01 PROCEDURE — 0W993ZZ DRAINAGE OF RIGHT PLEURAL CAVITY, PERCUTANEOUS APPROACH: ICD-10-PCS | Performed by: RADIOLOGY

## 2019-01-01 PROCEDURE — 71000027 ZZH RECOVERY PHASE 2 EACH 15 MINS: Performed by: INTERNAL MEDICINE

## 2019-01-01 PROCEDURE — 87040 BLOOD CULTURE FOR BACTERIA: CPT | Performed by: NURSE PRACTITIONER

## 2019-01-01 PROCEDURE — 96365 THER/PROPH/DIAG IV INF INIT: CPT | Performed by: EMERGENCY MEDICINE

## 2019-01-01 PROCEDURE — 93325 DOPPLER ECHO COLOR FLOW MAPG: CPT | Mod: 26 | Performed by: INTERNAL MEDICINE

## 2019-01-01 PROCEDURE — 82565 ASSAY OF CREATININE: CPT | Performed by: STUDENT IN AN ORGANIZED HEALTH CARE EDUCATION/TRAINING PROGRAM

## 2019-01-01 DEVICE — STENT ZIMMON PANCREA 7FRX12CM SGL PIGTAIL G22356: Type: IMPLANTABLE DEVICE | Site: BILE DUCT | Status: FUNCTIONAL

## 2019-01-01 DEVICE — IMP PLATE SYN BOX LOW PROFILE 04H TI 421.511: Type: IMPLANTABLE DEVICE | Site: SKULL | Status: FUNCTIONAL

## 2019-01-01 DEVICE — STENT JOHLIN PANCREA WEDGE 08.5FRX22CM WINTRO G26832: Type: IMPLANTABLE DEVICE | Site: BILE DUCT | Status: FUNCTIONAL

## 2019-01-01 DEVICE — STENT URETERAL PERCUFLEX PLUS 6FRX26CM M0061752630: Type: IMPLANTABLE DEVICE | Site: URETER | Status: FUNCTIONAL

## 2019-01-01 DEVICE — GRAFT DURAGEN 3X3" ID330: Type: IMPLANTABLE DEVICE | Site: BRAIN | Status: FUNCTIONAL

## 2019-01-01 DEVICE — IMP SCR SYN MATRIX LOW PRO 1.5X04MM SELF DRILL 04.503.104.01: Type: IMPLANTABLE DEVICE | Site: SKULL | Status: FUNCTIONAL

## 2019-01-01 DEVICE — IMP BUR HOLE COVER 17MM LOW PROFILE TI 421.527: Type: IMPLANTABLE DEVICE | Site: SKULL | Status: FUNCTIONAL

## 2019-01-01 DEVICE — STENT ZIMMON PANCREA 7FRX15CM SGL PIGTAIL SPSOF-7-15 G22983
Type: IMPLANTABLE DEVICE | Site: BILE DUCT | Status: NON-FUNCTIONAL
Removed: 2019-10-29

## 2019-01-01 DEVICE — URETERAL STENT
Type: IMPLANTABLE DEVICE | Status: FUNCTIONAL
Brand: PERCUFLEX™ PLUS

## 2019-01-01 DEVICE — STENT BILIARY COMPASS BDS 7FRX15CM DBL PIGTAIL G55521: Type: IMPLANTABLE DEVICE | Status: FUNCTIONAL

## 2019-01-01 RX ORDER — EPINEPHRINE 0.3 MG/.3ML
0.3 INJECTION SUBCUTANEOUS EVERY 5 MIN PRN
Status: CANCELLED | OUTPATIENT
Start: 2019-01-01

## 2019-01-01 RX ORDER — ALBUTEROL SULFATE 0.83 MG/ML
2.5 SOLUTION RESPIRATORY (INHALATION)
Status: DISCONTINUED | OUTPATIENT
Start: 2019-01-01 | End: 2019-01-01 | Stop reason: HOSPADM

## 2019-01-01 RX ORDER — EPINEPHRINE 1 MG/ML
0.3 INJECTION, SOLUTION INTRAMUSCULAR; SUBCUTANEOUS EVERY 5 MIN PRN
Status: CANCELLED | OUTPATIENT
Start: 2019-01-01

## 2019-01-01 RX ORDER — ALBUTEROL SULFATE 90 UG/1
1-2 AEROSOL, METERED RESPIRATORY (INHALATION)
Status: CANCELLED
Start: 2019-01-01

## 2019-01-01 RX ORDER — METHYLPREDNISOLONE SODIUM SUCCINATE 125 MG/2ML
125 INJECTION, POWDER, LYOPHILIZED, FOR SOLUTION INTRAMUSCULAR; INTRAVENOUS
Status: CANCELLED
Start: 2019-01-01

## 2019-01-01 RX ORDER — POTASSIUM CHLORIDE 750 MG/1
20-40 TABLET, EXTENDED RELEASE ORAL
Status: DISCONTINUED | OUTPATIENT
Start: 2019-01-01 | End: 2019-01-01

## 2019-01-01 RX ORDER — HEPARIN SODIUM (PORCINE) LOCK FLUSH IV SOLN 100 UNIT/ML 100 UNIT/ML
500 SOLUTION INTRAVENOUS DAILY PRN
Status: DISCONTINUED | OUTPATIENT
Start: 2019-01-01 | End: 2019-01-01 | Stop reason: HOSPADM

## 2019-01-01 RX ORDER — FENTANYL CITRATE 50 UG/ML
25-50 INJECTION, SOLUTION INTRAMUSCULAR; INTRAVENOUS
Status: DISCONTINUED | OUTPATIENT
Start: 2019-01-01 | End: 2019-01-01 | Stop reason: HOSPADM

## 2019-01-01 RX ORDER — CIPROFLOXACIN 500 MG/1
500 TABLET, FILM COATED ORAL EVERY 12 HOURS
Qty: 15 TABLET | Refills: 0 | Status: ON HOLD | OUTPATIENT
Start: 2019-01-01 | End: 2019-01-01

## 2019-01-01 RX ORDER — HYDRALAZINE HYDROCHLORIDE 20 MG/ML
2.5-5 INJECTION INTRAMUSCULAR; INTRAVENOUS EVERY 10 MIN PRN
Status: DISCONTINUED | OUTPATIENT
Start: 2019-01-01 | End: 2019-01-01 | Stop reason: HOSPADM

## 2019-01-01 RX ORDER — LEVOTHYROXINE SODIUM 50 UG/1
50 TABLET ORAL DAILY
Status: DISCONTINUED | OUTPATIENT
Start: 2019-01-01 | End: 2019-01-01 | Stop reason: HOSPADM

## 2019-01-01 RX ORDER — ALBUTEROL SULFATE 0.83 MG/ML
2.5 SOLUTION RESPIRATORY (INHALATION)
Status: CANCELLED | OUTPATIENT
Start: 2019-01-01

## 2019-01-01 RX ORDER — ONDANSETRON 2 MG/ML
4 INJECTION INTRAMUSCULAR; INTRAVENOUS EVERY 30 MIN PRN
Status: DISCONTINUED | OUTPATIENT
Start: 2019-01-01 | End: 2019-01-01 | Stop reason: HOSPADM

## 2019-01-01 RX ORDER — ONDANSETRON 4 MG/1
4 TABLET, ORALLY DISINTEGRATING ORAL EVERY 6 HOURS PRN
Status: DISCONTINUED | OUTPATIENT
Start: 2019-01-01 | End: 2019-01-01 | Stop reason: HOSPADM

## 2019-01-01 RX ORDER — DEXAMETHASONE 1 MG
3 TABLET ORAL EVERY 8 HOURS SCHEDULED
Status: DISCONTINUED | OUTPATIENT
Start: 2019-01-01 | End: 2019-01-01 | Stop reason: HOSPADM

## 2019-01-01 RX ORDER — ONDANSETRON 4 MG/1
4-8 TABLET, ORALLY DISINTEGRATING ORAL EVERY 6 HOURS PRN
Qty: 20 TABLET | Refills: 0 | Status: ON HOLD | OUTPATIENT
Start: 2019-01-01 | End: 2019-01-01

## 2019-01-01 RX ORDER — ONDANSETRON 4 MG/1
4 TABLET, ORALLY DISINTEGRATING ORAL EVERY 30 MIN PRN
Status: DISCONTINUED | OUTPATIENT
Start: 2019-01-01 | End: 2019-01-01 | Stop reason: HOSPADM

## 2019-01-01 RX ORDER — LIDOCAINE 40 MG/G
CREAM TOPICAL
Status: CANCELLED | OUTPATIENT
Start: 2019-01-01

## 2019-01-01 RX ORDER — MAGNESIUM SULFATE HEPTAHYDRATE 40 MG/ML
2 INJECTION, SOLUTION INTRAVENOUS ONCE
Status: COMPLETED | OUTPATIENT
Start: 2019-01-01 | End: 2019-01-01

## 2019-01-01 RX ORDER — LIDOCAINE HYDROCHLORIDE 10 MG/ML
10 INJECTION, SOLUTION EPIDURAL; INFILTRATION; INTRACAUDAL; PERINEURAL ONCE
Status: COMPLETED | OUTPATIENT
Start: 2019-01-01 | End: 2019-01-01

## 2019-01-01 RX ORDER — DEXAMETHASONE SODIUM PHOSPHATE 4 MG/ML
4 INJECTION, SOLUTION INTRA-ARTICULAR; INTRALESIONAL; INTRAMUSCULAR; INTRAVENOUS; SOFT TISSUE EVERY 12 HOURS
Status: DISCONTINUED | OUTPATIENT
Start: 2019-01-01 | End: 2019-01-01 | Stop reason: HOSPADM

## 2019-01-01 RX ORDER — POTASSIUM CHLORIDE 1.5 G/1.58G
40 POWDER, FOR SOLUTION ORAL ONCE
Status: COMPLETED | OUTPATIENT
Start: 2019-01-01 | End: 2019-01-01

## 2019-01-01 RX ORDER — DIPHENHYDRAMINE HCL 25 MG
25 CAPSULE ORAL ONCE
Status: COMPLETED | OUTPATIENT
Start: 2019-01-01 | End: 2019-01-01

## 2019-01-01 RX ORDER — DIPHENHYDRAMINE HCL 25 MG
25 CAPSULE ORAL ONCE
Status: CANCELLED
Start: 2019-01-01

## 2019-01-01 RX ORDER — PROPOFOL 10 MG/ML
INJECTION, EMULSION INTRAVENOUS PRN
Status: DISCONTINUED | OUTPATIENT
Start: 2019-01-01 | End: 2019-01-01

## 2019-01-01 RX ORDER — MEPERIDINE HYDROCHLORIDE 25 MG/ML
25 INJECTION INTRAMUSCULAR; INTRAVENOUS; SUBCUTANEOUS EVERY 30 MIN PRN
Status: CANCELLED | OUTPATIENT
Start: 2019-01-01

## 2019-01-01 RX ORDER — HEPARIN SODIUM,PORCINE 10 UNIT/ML
5 VIAL (ML) INTRAVENOUS
Status: CANCELLED | OUTPATIENT
Start: 2019-01-01

## 2019-01-01 RX ORDER — NICOTINE POLACRILEX 4 MG
15-30 LOZENGE BUCCAL
Status: CANCELLED | OUTPATIENT
Start: 2019-01-01

## 2019-01-01 RX ORDER — NALOXONE HYDROCHLORIDE 0.4 MG/ML
.1-.4 INJECTION, SOLUTION INTRAMUSCULAR; INTRAVENOUS; SUBCUTANEOUS
Status: CANCELLED | OUTPATIENT
Start: 2019-01-01

## 2019-01-01 RX ORDER — ACETAMINOPHEN 650 MG/1
650 SUPPOSITORY RECTAL EVERY 4 HOURS PRN
Status: DISCONTINUED | OUTPATIENT
Start: 2019-01-01 | End: 2019-01-01 | Stop reason: HOSPADM

## 2019-01-01 RX ORDER — POTASSIUM CHLORIDE 29.8 MG/ML
20 INJECTION INTRAVENOUS
Status: DISCONTINUED | OUTPATIENT
Start: 2019-01-01 | End: 2019-01-01 | Stop reason: HOSPADM

## 2019-01-01 RX ORDER — DEXTROSE MONOHYDRATE 25 G/50ML
25-50 INJECTION, SOLUTION INTRAVENOUS
Status: CANCELLED | OUTPATIENT
Start: 2019-01-01

## 2019-01-01 RX ORDER — CLOTRIMAZOLE 10 MG/1
10 LOZENGE ORAL
Qty: 70 TROCHE | Refills: 0 | Status: ON HOLD | OUTPATIENT
Start: 2019-01-01 | End: 2019-01-01

## 2019-01-01 RX ORDER — TAMSULOSIN HYDROCHLORIDE 0.4 MG/1
0.4 CAPSULE ORAL DAILY
Status: DISCONTINUED | OUTPATIENT
Start: 2019-01-01 | End: 2019-01-01 | Stop reason: HOSPADM

## 2019-01-01 RX ORDER — POTASSIUM CHLORIDE 1.5 G/1.58G
20-40 POWDER, FOR SOLUTION ORAL
Status: DISCONTINUED | OUTPATIENT
Start: 2019-01-01 | End: 2019-01-01 | Stop reason: HOSPADM

## 2019-01-01 RX ORDER — GADOBUTROL 604.72 MG/ML
7.5 INJECTION INTRAVENOUS ONCE
Status: DISCONTINUED | OUTPATIENT
Start: 2019-01-01 | End: 2019-01-01 | Stop reason: HOSPADM

## 2019-01-01 RX ORDER — DIPHENHYDRAMINE HYDROCHLORIDE 50 MG/ML
50 INJECTION INTRAMUSCULAR; INTRAVENOUS
Status: CANCELLED
Start: 2019-01-01

## 2019-01-01 RX ORDER — LIDOCAINE 40 MG/G
CREAM TOPICAL
Status: DISCONTINUED | OUTPATIENT
Start: 2019-01-01 | End: 2019-01-01

## 2019-01-01 RX ORDER — SODIUM CHLORIDE 1 G/1
1 TABLET ORAL 2 TIMES DAILY WITH MEALS
Status: DISCONTINUED | OUTPATIENT
Start: 2019-01-01 | End: 2019-01-01

## 2019-01-01 RX ORDER — POTASSIUM CHLORIDE 7.45 MG/ML
10 INJECTION INTRAVENOUS
Status: DISCONTINUED | OUTPATIENT
Start: 2019-01-01 | End: 2019-01-01 | Stop reason: HOSPADM

## 2019-01-01 RX ORDER — CEFAZOLIN SODIUM 2 G/50ML
2 SOLUTION INTRAVENOUS
Status: DISCONTINUED | OUTPATIENT
Start: 2019-01-01 | End: 2019-01-01 | Stop reason: HOSPADM

## 2019-01-01 RX ORDER — DEXAMETHASONE SODIUM PHOSPHATE 4 MG/ML
INJECTION, SOLUTION INTRA-ARTICULAR; INTRALESIONAL; INTRAMUSCULAR; INTRAVENOUS; SOFT TISSUE PRN
Status: DISCONTINUED | OUTPATIENT
Start: 2019-01-01 | End: 2019-01-01

## 2019-01-01 RX ORDER — NALOXONE HYDROCHLORIDE 0.4 MG/ML
.1-.4 INJECTION, SOLUTION INTRAMUSCULAR; INTRAVENOUS; SUBCUTANEOUS
Status: DISCONTINUED | OUTPATIENT
Start: 2019-01-01 | End: 2019-01-01 | Stop reason: HOSPADM

## 2019-01-01 RX ORDER — BISACODYL 10 MG
10 SUPPOSITORY, RECTAL RECTAL DAILY PRN
Status: DISCONTINUED | OUTPATIENT
Start: 2019-01-01 | End: 2019-01-01

## 2019-01-01 RX ORDER — SODIUM CHLORIDE, SODIUM LACTATE, POTASSIUM CHLORIDE, CALCIUM CHLORIDE 600; 310; 30; 20 MG/100ML; MG/100ML; MG/100ML; MG/100ML
INJECTION, SOLUTION INTRAVENOUS CONTINUOUS PRN
Status: DISCONTINUED | OUTPATIENT
Start: 2019-01-01 | End: 2019-01-01

## 2019-01-01 RX ORDER — OXYCODONE HYDROCHLORIDE 5 MG/1
5 TABLET ORAL EVERY 6 HOURS PRN
Qty: 60 TABLET | Refills: 0 | Status: ON HOLD | OUTPATIENT
Start: 2019-01-01 | End: 2019-01-01

## 2019-01-01 RX ORDER — HEPARIN SODIUM (PORCINE) LOCK FLUSH IV SOLN 100 UNIT/ML 100 UNIT/ML
5 SOLUTION INTRAVENOUS
Status: CANCELLED | OUTPATIENT
Start: 2019-01-01

## 2019-01-01 RX ORDER — NALOXONE HYDROCHLORIDE 0.4 MG/ML
.1-.4 INJECTION, SOLUTION INTRAMUSCULAR; INTRAVENOUS; SUBCUTANEOUS
Status: DISCONTINUED | OUTPATIENT
Start: 2019-01-01 | End: 2019-01-01

## 2019-01-01 RX ORDER — SODIUM CHLORIDE 9 MG/ML
1000 INJECTION, SOLUTION INTRAVENOUS CONTINUOUS PRN
Status: CANCELLED
Start: 2019-01-01

## 2019-01-01 RX ORDER — ASPIRIN 81 MG/1
81 TABLET ORAL DAILY
Status: DISCONTINUED | OUTPATIENT
Start: 2019-01-01 | End: 2019-01-01

## 2019-01-01 RX ORDER — LORAZEPAM 2 MG/ML
1 INJECTION INTRAMUSCULAR EVERY 6 HOURS PRN
Status: CANCELLED
Start: 2019-01-01

## 2019-01-01 RX ORDER — ONDANSETRON 2 MG/ML
4 INJECTION INTRAMUSCULAR; INTRAVENOUS EVERY 6 HOURS PRN
Status: DISCONTINUED | OUTPATIENT
Start: 2019-01-01 | End: 2019-01-01 | Stop reason: HOSPADM

## 2019-01-01 RX ORDER — HEPARIN SODIUM,PORCINE 10 UNIT/ML
5-10 VIAL (ML) INTRAVENOUS EVERY 24 HOURS
Status: DISCONTINUED | OUTPATIENT
Start: 2019-01-01 | End: 2019-01-01 | Stop reason: HOSPADM

## 2019-01-01 RX ORDER — HYDROMORPHONE HYDROCHLORIDE 1 MG/ML
.3-.5 INJECTION, SOLUTION INTRAMUSCULAR; INTRAVENOUS; SUBCUTANEOUS EVERY 5 MIN PRN
Status: DISCONTINUED | OUTPATIENT
Start: 2019-01-01 | End: 2019-01-01 | Stop reason: HOSPADM

## 2019-01-01 RX ORDER — NICOTINE POLACRILEX 4 MG
15-30 LOZENGE BUCCAL
Status: DISCONTINUED | OUTPATIENT
Start: 2019-01-01 | End: 2019-01-01 | Stop reason: HOSPADM

## 2019-01-01 RX ORDER — PROCHLORPERAZINE MALEATE 5 MG
10 TABLET ORAL EVERY 6 HOURS PRN
Status: DISCONTINUED | OUTPATIENT
Start: 2019-01-01 | End: 2019-01-01 | Stop reason: HOSPADM

## 2019-01-01 RX ORDER — ONDANSETRON 2 MG/ML
4-8 INJECTION INTRAMUSCULAR; INTRAVENOUS EVERY 6 HOURS PRN
Status: DISCONTINUED | OUTPATIENT
Start: 2019-01-01 | End: 2019-01-01 | Stop reason: HOSPADM

## 2019-01-01 RX ORDER — IPRATROPIUM BROMIDE AND ALBUTEROL SULFATE 2.5; .5 MG/3ML; MG/3ML
3 SOLUTION RESPIRATORY (INHALATION) EVERY 6 HOURS PRN
Status: DISCONTINUED | OUTPATIENT
Start: 2019-01-01 | End: 2019-01-01 | Stop reason: HOSPADM

## 2019-01-01 RX ORDER — CIPROFLOXACIN 500 MG/1
500 TABLET, FILM COATED ORAL
Status: DISCONTINUED | OUTPATIENT
Start: 2019-01-01 | End: 2019-01-01 | Stop reason: HOSPADM

## 2019-01-01 RX ORDER — CEFTRIAXONE 2 G/1
2 INJECTION, POWDER, FOR SOLUTION INTRAMUSCULAR; INTRAVENOUS EVERY 24 HOURS
Status: DISCONTINUED | OUTPATIENT
Start: 2019-01-01 | End: 2019-01-01 | Stop reason: HOSPADM

## 2019-01-01 RX ORDER — POLYETHYLENE GLYCOL 3350 17 G/17G
17 POWDER, FOR SOLUTION ORAL DAILY
Status: DISCONTINUED | OUTPATIENT
Start: 2019-01-01 | End: 2019-01-01 | Stop reason: HOSPADM

## 2019-01-01 RX ORDER — LIDOCAINE 40 MG/G
CREAM TOPICAL
Status: DISCONTINUED | OUTPATIENT
Start: 2019-01-01 | End: 2019-01-01 | Stop reason: HOSPADM

## 2019-01-01 RX ORDER — MAGNESIUM SULFATE HEPTAHYDRATE 40 MG/ML
4 INJECTION, SOLUTION INTRAVENOUS ONCE
Status: COMPLETED | OUTPATIENT
Start: 2019-01-01 | End: 2019-01-01

## 2019-01-01 RX ORDER — CEFAZOLIN SODIUM 1 G/50ML
1 SOLUTION INTRAVENOUS SEE ADMIN INSTRUCTIONS
Status: DISCONTINUED | OUTPATIENT
Start: 2019-01-01 | End: 2019-01-01 | Stop reason: HOSPADM

## 2019-01-01 RX ORDER — BUPIVACAINE HYDROCHLORIDE AND EPINEPHRINE 5; 5 MG/ML; UG/ML
INJECTION, SOLUTION PERINEURAL PRN
Status: DISCONTINUED | OUTPATIENT
Start: 2019-01-01 | End: 2019-01-01 | Stop reason: HOSPADM

## 2019-01-01 RX ORDER — HYDROMORPHONE HYDROCHLORIDE 1 MG/ML
.3-.5 INJECTION, SOLUTION INTRAMUSCULAR; INTRAVENOUS; SUBCUTANEOUS EVERY 10 MIN PRN
Status: DISCONTINUED | OUTPATIENT
Start: 2019-01-01 | End: 2019-01-01 | Stop reason: HOSPADM

## 2019-01-01 RX ORDER — POTASSIUM CHLORIDE 1500 MG/1
40 TABLET, EXTENDED RELEASE ORAL ONCE
Status: COMPLETED | OUTPATIENT
Start: 2019-01-01 | End: 2019-01-01

## 2019-01-01 RX ORDER — LABETALOL 20 MG/4 ML (5 MG/ML) INTRAVENOUS SYRINGE
10-40 EVERY 10 MIN PRN
Status: DISCONTINUED | OUTPATIENT
Start: 2019-01-01 | End: 2019-01-01 | Stop reason: HOSPADM

## 2019-01-01 RX ORDER — DRONABINOL 2.5 MG/1
2.5 CAPSULE ORAL
Status: DISCONTINUED | OUTPATIENT
Start: 2019-01-01 | End: 2019-01-01 | Stop reason: HOSPADM

## 2019-01-01 RX ORDER — CEFTRIAXONE 2 G/1
2 INJECTION, POWDER, FOR SOLUTION INTRAMUSCULAR; INTRAVENOUS EVERY 24 HOURS
Qty: 220 ML | Refills: 0 | Status: SHIPPED | OUTPATIENT
Start: 2019-01-01 | End: 2019-01-01

## 2019-01-01 RX ORDER — SENNOSIDES A AND B 8.6 MG/1
1 TABLET, FILM COATED ORAL 2 TIMES DAILY PRN
COMMUNITY

## 2019-01-01 RX ORDER — HEPARIN SODIUM (PORCINE) LOCK FLUSH IV SOLN 100 UNIT/ML 100 UNIT/ML
500 SOLUTION INTRAVENOUS DAILY PRN
Status: CANCELLED
Start: 2019-01-01

## 2019-01-01 RX ORDER — MAGNESIUM OXIDE 400 MG/1
400 TABLET ORAL 2 TIMES DAILY
Status: DISCONTINUED | OUTPATIENT
Start: 2019-01-01 | End: 2019-01-01 | Stop reason: HOSPADM

## 2019-01-01 RX ORDER — DEXAMETHASONE 1 MG
1 TABLET ORAL EVERY 8 HOURS SCHEDULED
Status: DISCONTINUED | OUTPATIENT
Start: 2019-01-01 | End: 2019-01-01 | Stop reason: HOSPADM

## 2019-01-01 RX ORDER — SODIUM CHLORIDE 9 MG/ML
1000 INJECTION, SOLUTION INTRAVENOUS CONTINUOUS
Status: DISCONTINUED | OUTPATIENT
Start: 2019-01-01 | End: 2019-01-01

## 2019-01-01 RX ORDER — HEPARIN SODIUM (PORCINE) LOCK FLUSH IV SOLN 100 UNIT/ML 100 UNIT/ML
5 SOLUTION INTRAVENOUS
Status: DISCONTINUED | OUTPATIENT
Start: 2019-01-01 | End: 2019-01-01 | Stop reason: HOSPADM

## 2019-01-01 RX ORDER — SODIUM CHLORIDE, SODIUM LACTATE, POTASSIUM CHLORIDE, CALCIUM CHLORIDE 600; 310; 30; 20 MG/100ML; MG/100ML; MG/100ML; MG/100ML
INJECTION, SOLUTION INTRAVENOUS CONTINUOUS
Status: DISCONTINUED | OUTPATIENT
Start: 2019-01-01 | End: 2019-01-01 | Stop reason: HOSPADM

## 2019-01-01 RX ORDER — HEPARIN SODIUM,PORCINE 10 UNIT/ML
3 VIAL (ML) INTRAVENOUS
Status: DISCONTINUED | OUTPATIENT
Start: 2019-01-01 | End: 2019-01-01 | Stop reason: HOSPADM

## 2019-01-01 RX ORDER — LEVOFLOXACIN 5 MG/ML
INJECTION, SOLUTION INTRAVENOUS PRN
Status: DISCONTINUED | OUTPATIENT
Start: 2019-01-01 | End: 2019-01-01

## 2019-01-01 RX ORDER — CIPROFLOXACIN 2 MG/ML
INJECTION, SOLUTION INTRAVENOUS PRN
Status: DISCONTINUED | OUTPATIENT
Start: 2019-01-01 | End: 2019-01-01

## 2019-01-01 RX ORDER — POLYETHYLENE GLYCOL 3350 17 G/17G
17 POWDER, FOR SOLUTION ORAL DAILY PRN
Status: DISCONTINUED | OUTPATIENT
Start: 2019-01-01 | End: 2019-01-01

## 2019-01-01 RX ORDER — HEPARIN SODIUM,PORCINE 10 UNIT/ML
5 VIAL (ML) INTRAVENOUS
Status: DISCONTINUED | OUTPATIENT
Start: 2019-01-01 | End: 2019-01-01 | Stop reason: HOSPADM

## 2019-01-01 RX ORDER — ONDANSETRON 2 MG/ML
INJECTION INTRAMUSCULAR; INTRAVENOUS PRN
Status: DISCONTINUED | OUTPATIENT
Start: 2019-01-01 | End: 2019-01-01

## 2019-01-01 RX ORDER — POTASSIUM CHLORIDE 7.45 MG/ML
10 INJECTION INTRAVENOUS
Status: DISCONTINUED | OUTPATIENT
Start: 2019-01-01 | End: 2019-01-01

## 2019-01-01 RX ORDER — MEGESTROL ACETATE 40 MG/ML
400 SUSPENSION ORAL 2 TIMES DAILY
Qty: 600 ML | Refills: 1 | Status: SHIPPED | OUTPATIENT
Start: 2019-01-01

## 2019-01-01 RX ORDER — FENTANYL CITRATE 50 UG/ML
INJECTION, SOLUTION INTRAMUSCULAR; INTRAVENOUS PRN
Status: DISCONTINUED | OUTPATIENT
Start: 2019-01-01 | End: 2019-01-01

## 2019-01-01 RX ORDER — MEPERIDINE HYDROCHLORIDE 25 MG/ML
12.5 INJECTION INTRAMUSCULAR; INTRAVENOUS; SUBCUTANEOUS
Status: DISCONTINUED | OUTPATIENT
Start: 2019-01-01 | End: 2019-01-01 | Stop reason: HOSPADM

## 2019-01-01 RX ORDER — DEXTROSE MONOHYDRATE 25 G/50ML
25-50 INJECTION, SOLUTION INTRAVENOUS
Status: DISCONTINUED | OUTPATIENT
Start: 2019-01-01 | End: 2019-01-01 | Stop reason: HOSPADM

## 2019-01-01 RX ORDER — GADOBUTROL 604.72 MG/ML
0.1 INJECTION INTRAVENOUS ONCE
Status: COMPLETED | OUTPATIENT
Start: 2019-01-01 | End: 2019-01-01

## 2019-01-01 RX ORDER — HEPARIN SODIUM,PORCINE 10 UNIT/ML
2-5 VIAL (ML) INTRAVENOUS
Status: DISCONTINUED | OUTPATIENT
Start: 2019-01-01 | End: 2019-01-01 | Stop reason: HOSPADM

## 2019-01-01 RX ORDER — CEFAZOLIN SODIUM 2 G/50ML
2 SOLUTION INTRAVENOUS
Status: CANCELLED | OUTPATIENT
Start: 2019-01-01

## 2019-01-01 RX ORDER — POTASSIUM CHLORIDE 7.45 MG/ML
10 INJECTION INTRAVENOUS CONTINUOUS
Status: DISCONTINUED | OUTPATIENT
Start: 2019-01-01 | End: 2019-01-01

## 2019-01-01 RX ORDER — INDOMETHACIN 50 MG/1
100 SUPPOSITORY RECTAL
Status: CANCELLED | OUTPATIENT
Start: 2019-01-01

## 2019-01-01 RX ORDER — CYANOCOBALAMIN 1000 UG/ML
1000 INJECTION, SOLUTION INTRAMUSCULAR; SUBCUTANEOUS
Status: DISCONTINUED | OUTPATIENT
Start: 2019-01-01 | End: 2019-01-01 | Stop reason: HOSPADM

## 2019-01-01 RX ORDER — ACETAMINOPHEN 325 MG/1
975 TABLET ORAL ONCE
Status: COMPLETED | OUTPATIENT
Start: 2019-01-01 | End: 2019-01-01

## 2019-01-01 RX ORDER — POLYETHYLENE GLYCOL 3350 17 G/17G
17 POWDER, FOR SOLUTION ORAL DAILY
Status: DISCONTINUED | OUTPATIENT
Start: 2019-01-01 | End: 2019-01-01

## 2019-01-01 RX ORDER — AMOXICILLIN 250 MG
2 CAPSULE ORAL 2 TIMES DAILY
Status: DISCONTINUED | OUTPATIENT
Start: 2019-01-01 | End: 2019-01-01 | Stop reason: HOSPADM

## 2019-01-01 RX ORDER — POTASSIUM CHLORIDE 29.8 MG/ML
20 INJECTION INTRAVENOUS
Status: DISCONTINUED | OUTPATIENT
Start: 2019-01-01 | End: 2019-01-01

## 2019-01-01 RX ORDER — MORPHINE SULFATE 15 MG/1
15 TABLET, FILM COATED, EXTENDED RELEASE ORAL EVERY 12 HOURS
Status: DISCONTINUED | OUTPATIENT
Start: 2019-01-01 | End: 2019-01-01 | Stop reason: HOSPADM

## 2019-01-01 RX ORDER — OXYCODONE HYDROCHLORIDE 5 MG/1
5 TABLET ORAL EVERY 6 HOURS PRN
Status: DISCONTINUED | OUTPATIENT
Start: 2019-01-01 | End: 2019-01-01 | Stop reason: HOSPADM

## 2019-01-01 RX ORDER — POTASSIUM CL/LIDO/0.9 % NACL 10MEQ/0.1L
10 INTRAVENOUS SOLUTION, PIGGYBACK (ML) INTRAVENOUS
Status: DISCONTINUED | OUTPATIENT
Start: 2019-01-01 | End: 2019-01-01 | Stop reason: HOSPADM

## 2019-01-01 RX ORDER — LEVOFLOXACIN 500 MG/1
500 TABLET, FILM COATED ORAL DAILY
Qty: 5 TABLET | Refills: 0 | Status: SHIPPED | OUTPATIENT
Start: 2019-01-01 | End: 2019-01-01

## 2019-01-01 RX ORDER — ASCORBIC ACID 250 MG
500 TABLET,CHEWABLE ORAL DAILY
Status: DISCONTINUED | OUTPATIENT
Start: 2019-01-01 | End: 2019-01-01 | Stop reason: HOSPADM

## 2019-01-01 RX ORDER — IPRATROPIUM BROMIDE AND ALBUTEROL SULFATE 2.5; .5 MG/3ML; MG/3ML
3 SOLUTION RESPIRATORY (INHALATION)
Status: DISCONTINUED | OUTPATIENT
Start: 2019-01-01 | End: 2019-01-01 | Stop reason: HOSPADM

## 2019-01-01 RX ORDER — OXYCODONE HYDROCHLORIDE 5 MG/1
5 TABLET ORAL ONCE
Status: COMPLETED | OUTPATIENT
Start: 2019-01-01 | End: 2019-01-01

## 2019-01-01 RX ORDER — HYDRALAZINE HYDROCHLORIDE 20 MG/ML
10-20 INJECTION INTRAMUSCULAR; INTRAVENOUS EVERY 30 MIN PRN
Status: DISCONTINUED | OUTPATIENT
Start: 2019-01-01 | End: 2019-01-01 | Stop reason: HOSPADM

## 2019-01-01 RX ORDER — POTASSIUM CHLORIDE 1500 MG/1
20-40 TABLET, EXTENDED RELEASE ORAL
Status: DISCONTINUED | OUTPATIENT
Start: 2019-01-01 | End: 2019-01-01 | Stop reason: HOSPADM

## 2019-01-01 RX ORDER — HEPARIN SODIUM (PORCINE) LOCK FLUSH IV SOLN 100 UNIT/ML 100 UNIT/ML
5 SOLUTION INTRAVENOUS ONCE
Status: COMPLETED | OUTPATIENT
Start: 2019-01-01 | End: 2019-01-01

## 2019-01-01 RX ORDER — SODIUM CHLORIDE 9 MG/ML
INJECTION, SOLUTION INTRAVENOUS CONTINUOUS
Status: DISCONTINUED | OUTPATIENT
Start: 2019-01-01 | End: 2019-01-01 | Stop reason: HOSPADM

## 2019-01-01 RX ORDER — MAGNESIUM SULFATE HEPTAHYDRATE 40 MG/ML
4 INJECTION, SOLUTION INTRAVENOUS ONCE
Status: DISCONTINUED | OUTPATIENT
Start: 2019-01-01 | End: 2019-01-01 | Stop reason: CLARIF

## 2019-01-01 RX ORDER — GADOBUTROL 604.72 MG/ML
7.5 INJECTION INTRAVENOUS ONCE
Status: COMPLETED | OUTPATIENT
Start: 2019-01-01 | End: 2019-01-01

## 2019-01-01 RX ORDER — DEXAMETHASONE 2 MG/1
2 TABLET ORAL EVERY 8 HOURS
Qty: 9 TABLET | Refills: 0 | Status: ON HOLD | OUTPATIENT
Start: 2019-01-01 | End: 2019-01-01

## 2019-01-01 RX ORDER — PANTOPRAZOLE SODIUM 40 MG/1
40 TABLET, DELAYED RELEASE ORAL DAILY
Status: DISCONTINUED | OUTPATIENT
Start: 2019-01-01 | End: 2019-01-01 | Stop reason: HOSPADM

## 2019-01-01 RX ORDER — HEPARIN SODIUM (PORCINE) LOCK FLUSH IV SOLN 100 UNIT/ML 100 UNIT/ML
500 SOLUTION INTRAVENOUS ONCE
Status: COMPLETED | OUTPATIENT
Start: 2019-01-01 | End: 2019-01-01

## 2019-01-01 RX ORDER — CEFAZOLIN SODIUM 2 G/100ML
INJECTION, SOLUTION INTRAVENOUS PRN
Status: DISCONTINUED | OUTPATIENT
Start: 2019-01-01 | End: 2019-01-01

## 2019-01-01 RX ORDER — POLYETHYLENE GLYCOL 3350 17 G/17G
17 POWDER, FOR SOLUTION ORAL 2 TIMES DAILY
Status: DISCONTINUED | OUTPATIENT
Start: 2019-01-01 | End: 2019-01-01

## 2019-01-01 RX ORDER — GUAIFENESIN 600 MG/1
600 TABLET, EXTENDED RELEASE ORAL 2 TIMES DAILY
COMMUNITY
Start: 2019-01-01

## 2019-01-01 RX ORDER — ALBUMIN (HUMAN) 12.5 G/50ML
50 SOLUTION INTRAVENOUS ONCE
Status: DISCONTINUED | OUTPATIENT
Start: 2019-01-01 | End: 2019-01-01

## 2019-01-01 RX ORDER — DEXAMETHASONE SODIUM PHOSPHATE 4 MG/ML
4 INJECTION, SOLUTION INTRA-ARTICULAR; INTRALESIONAL; INTRAMUSCULAR; INTRAVENOUS; SOFT TISSUE EVERY 6 HOURS
Status: DISCONTINUED | OUTPATIENT
Start: 2019-01-01 | End: 2019-01-01

## 2019-01-01 RX ORDER — AMOXICILLIN 250 MG
2 CAPSULE ORAL 2 TIMES DAILY PRN
Status: DISCONTINUED | OUTPATIENT
Start: 2019-01-01 | End: 2019-01-01

## 2019-01-01 RX ORDER — TRAMADOL HYDROCHLORIDE 50 MG/1
25-50 TABLET ORAL 2 TIMES DAILY PRN
Qty: 30 TABLET | Refills: 1 | Status: SHIPPED | OUTPATIENT
Start: 2019-01-01 | End: 2019-01-01

## 2019-01-01 RX ORDER — LEVETIRACETAM 500 MG/1
500 TABLET ORAL 2 TIMES DAILY
Status: CANCELLED | OUTPATIENT
Start: 2019-01-01 | End: 2019-01-01

## 2019-01-01 RX ORDER — FLUMAZENIL 0.1 MG/ML
0.2 INJECTION, SOLUTION INTRAVENOUS
Status: DISCONTINUED | OUTPATIENT
Start: 2019-01-01 | End: 2019-01-01 | Stop reason: HOSPADM

## 2019-01-01 RX ORDER — LIDOCAINE HYDROCHLORIDE 10 MG/ML
INJECTION, SOLUTION INFILTRATION; PERINEURAL
Status: DISPENSED
Start: 2019-01-01 | End: 2019-01-01

## 2019-01-01 RX ORDER — MANNITOL 20 G/100ML
INJECTION, SOLUTION INTRAVENOUS PRN
Status: DISCONTINUED | OUTPATIENT
Start: 2019-01-01 | End: 2019-01-01

## 2019-01-01 RX ORDER — MAGNESIUM SULFATE HEPTAHYDRATE 40 MG/ML
4 INJECTION, SOLUTION INTRAVENOUS EVERY 4 HOURS PRN
Status: DISCONTINUED | OUTPATIENT
Start: 2019-01-01 | End: 2019-01-01 | Stop reason: HOSPADM

## 2019-01-01 RX ORDER — MORPHINE SULFATE 4 MG/ML
1 INJECTION, SOLUTION INTRAMUSCULAR; INTRAVENOUS
Status: DISCONTINUED | OUTPATIENT
Start: 2019-01-01 | End: 2019-01-01 | Stop reason: HOSPADM

## 2019-01-01 RX ORDER — DEXAMETHASONE 1.5 MG/1
3 TABLET ORAL EVERY 8 HOURS
Qty: 8 TABLET | Refills: 0 | Status: SHIPPED | OUTPATIENT
Start: 2019-01-01 | End: 2019-01-01

## 2019-01-01 RX ORDER — HEPARIN SODIUM (PORCINE) LOCK FLUSH IV SOLN 100 UNIT/ML 100 UNIT/ML
5 SOLUTION INTRAVENOUS EVERY 8 HOURS
Status: DISCONTINUED | OUTPATIENT
Start: 2019-01-01 | End: 2019-01-01 | Stop reason: HOSPADM

## 2019-01-01 RX ORDER — ALBUTEROL SULFATE 90 UG/1
2 AEROSOL, METERED RESPIRATORY (INHALATION) EVERY 6 HOURS PRN
Status: DISCONTINUED | OUTPATIENT
Start: 2019-01-01 | End: 2019-01-01 | Stop reason: HOSPADM

## 2019-01-01 RX ORDER — HEPARIN SODIUM (PORCINE) LOCK FLUSH IV SOLN 100 UNIT/ML 100 UNIT/ML
SOLUTION INTRAVENOUS
Status: DISCONTINUED
Start: 2019-01-01 | End: 2019-01-01 | Stop reason: HOSPADM

## 2019-01-01 RX ORDER — DEXAMETHASONE SODIUM PHOSPHATE 10 MG/ML
4 INJECTION, SOLUTION INTRAMUSCULAR; INTRAVENOUS ONCE
Status: COMPLETED | OUTPATIENT
Start: 2019-01-01 | End: 2019-01-01

## 2019-01-01 RX ORDER — ALBUTEROL SULFATE 90 UG/1
2 AEROSOL, METERED RESPIRATORY (INHALATION) EVERY 6 HOURS PRN
Qty: 1 INHALER | Refills: 3 | Status: SHIPPED | OUTPATIENT
Start: 2019-01-01

## 2019-01-01 RX ORDER — OXYCODONE HYDROCHLORIDE 5 MG/1
5 TABLET ORAL EVERY 6 HOURS PRN
Qty: 30 TABLET | Refills: 0 | Status: SHIPPED | OUTPATIENT
Start: 2019-01-01

## 2019-01-01 RX ORDER — AMOXICILLIN 250 MG
1 CAPSULE ORAL 2 TIMES DAILY PRN
Status: DISCONTINUED | OUTPATIENT
Start: 2019-01-01 | End: 2019-01-01

## 2019-01-01 RX ORDER — POTASSIUM CHLORIDE 750 MG/1
20-40 TABLET, EXTENDED RELEASE ORAL
Status: DISCONTINUED | OUTPATIENT
Start: 2019-01-01 | End: 2019-01-01 | Stop reason: HOSPADM

## 2019-01-01 RX ORDER — OXYCODONE HYDROCHLORIDE 10 MG/1
10-15 TABLET ORAL
Status: DISCONTINUED | OUTPATIENT
Start: 2019-01-01 | End: 2019-01-01

## 2019-01-01 RX ORDER — ONDANSETRON 2 MG/ML
4 INJECTION INTRAMUSCULAR; INTRAVENOUS EVERY 30 MIN PRN
Status: DISCONTINUED | OUTPATIENT
Start: 2019-01-01 | End: 2019-01-01

## 2019-01-01 RX ORDER — ALBUTEROL SULFATE 0.83 MG/ML
2.5 SOLUTION RESPIRATORY (INHALATION) EVERY 4 HOURS PRN
Status: DISCONTINUED | OUTPATIENT
Start: 2019-01-01 | End: 2019-01-01 | Stop reason: HOSPADM

## 2019-01-01 RX ORDER — NITROFURANTOIN 25; 75 MG/1; MG/1
100 CAPSULE ORAL 2 TIMES DAILY
Qty: 10 CAPSULE | Refills: 0 | Status: SHIPPED | OUTPATIENT
Start: 2019-01-01 | End: 2019-01-01

## 2019-01-01 RX ORDER — DEXAMETHASONE 1 MG
1 TABLET ORAL EVERY 8 HOURS
Qty: 9 TABLET | Refills: 0 | Status: SHIPPED | OUTPATIENT
Start: 2019-01-01 | End: 2019-01-01

## 2019-01-01 RX ORDER — IOPAMIDOL 755 MG/ML
73 INJECTION, SOLUTION INTRAVASCULAR ONCE
Status: COMPLETED | OUTPATIENT
Start: 2019-01-01 | End: 2019-01-01

## 2019-01-01 RX ORDER — DRONABINOL 2.5 MG/1
2.5 CAPSULE ORAL
Qty: 60 CAPSULE | Refills: 3 | Status: SHIPPED | OUTPATIENT
Start: 2019-01-01

## 2019-01-01 RX ORDER — HYDROMORPHONE HYDROCHLORIDE 1 MG/ML
.3-.5 INJECTION, SOLUTION INTRAMUSCULAR; INTRAVENOUS; SUBCUTANEOUS
Status: DISCONTINUED | OUTPATIENT
Start: 2019-01-01 | End: 2019-01-01 | Stop reason: HOSPADM

## 2019-01-01 RX ORDER — POLYETHYLENE GLYCOL 3350 17 G/17G
1 POWDER, FOR SOLUTION ORAL DAILY
COMMUNITY

## 2019-01-01 RX ORDER — CEFAZOLIN SODIUM 1 G/50ML
1 INJECTION, SOLUTION INTRAVENOUS SEE ADMIN INSTRUCTIONS
Status: CANCELLED | OUTPATIENT
Start: 2019-01-01

## 2019-01-01 RX ORDER — KIT FOR THE PREPARATION OF TECHNETIUM TC 99M MEBROFENIN 45 MG/10ML
4.8-7.2 INJECTION, POWDER, LYOPHILIZED, FOR SOLUTION INTRAVENOUS ONCE
Status: COMPLETED | OUTPATIENT
Start: 2019-01-01 | End: 2019-01-01

## 2019-01-01 RX ORDER — HEPARIN SODIUM (PORCINE) LOCK FLUSH IV SOLN 100 UNIT/ML 100 UNIT/ML
500 SOLUTION INTRAVENOUS EVERY 8 HOURS PRN
Status: DISCONTINUED | OUTPATIENT
Start: 2019-01-01 | End: 2019-01-01 | Stop reason: HOSPADM

## 2019-01-01 RX ORDER — GUAIFENESIN 600 MG/1
600 TABLET, EXTENDED RELEASE ORAL 2 TIMES DAILY
Status: DISCONTINUED | OUTPATIENT
Start: 2019-01-01 | End: 2019-01-01 | Stop reason: HOSPADM

## 2019-01-01 RX ORDER — ALBUMIN, HUMAN INJ 5% 5 %
250 SOLUTION INTRAVENOUS ONCE
Status: COMPLETED | OUTPATIENT
Start: 2019-01-01 | End: 2019-01-01

## 2019-01-01 RX ORDER — PHENYLEPHRINE HCL IN 0.9% NACL 50MG/250ML
.5-6 PLASTIC BAG, INJECTION (ML) INTRAVENOUS CONTINUOUS
Status: DISCONTINUED | OUTPATIENT
Start: 2019-01-01 | End: 2019-01-01 | Stop reason: HOSPADM

## 2019-01-01 RX ORDER — METOPROLOL TARTRATE 1 MG/ML
1-2 INJECTION, SOLUTION INTRAVENOUS EVERY 5 MIN PRN
Status: DISCONTINUED | OUTPATIENT
Start: 2019-01-01 | End: 2019-01-01 | Stop reason: HOSPADM

## 2019-01-01 RX ORDER — DIPHENOXYLATE HCL/ATROPINE 2.5-.025MG
1-2 TABLET ORAL 4 TIMES DAILY PRN
Status: DISCONTINUED | OUTPATIENT
Start: 2019-01-01 | End: 2019-01-01 | Stop reason: HOSPADM

## 2019-01-01 RX ORDER — NITROGLYCERIN 0.4 MG/1
0.4 TABLET SUBLINGUAL EVERY 5 MIN PRN
Status: DISCONTINUED | OUTPATIENT
Start: 2019-01-01 | End: 2019-01-01 | Stop reason: HOSPADM

## 2019-01-01 RX ORDER — IOPAMIDOL 755 MG/ML
77 INJECTION, SOLUTION INTRAVASCULAR ONCE
Status: DISCONTINUED | OUTPATIENT
Start: 2019-01-01 | End: 2019-01-01

## 2019-01-01 RX ORDER — LEVETIRACETAM 10 MG/ML
1000 INJECTION INTRAVASCULAR ONCE
Status: COMPLETED | OUTPATIENT
Start: 2019-01-01 | End: 2019-01-01

## 2019-01-01 RX ORDER — HYDROMORPHONE HCL/0.9% NACL/PF 0.2MG/0.2
0.2 SYRINGE (ML) INTRAVENOUS
Status: DISCONTINUED | OUTPATIENT
Start: 2019-01-01 | End: 2019-01-01 | Stop reason: HOSPADM

## 2019-01-01 RX ORDER — SODIUM CHLORIDE AND POTASSIUM CHLORIDE 150; 900 MG/100ML; MG/100ML
INJECTION, SOLUTION INTRAVENOUS CONTINUOUS
Status: DISCONTINUED | OUTPATIENT
Start: 2019-01-01 | End: 2019-01-01

## 2019-01-01 RX ORDER — PROPOFOL 10 MG/ML
INJECTION, EMULSION INTRAVENOUS CONTINUOUS PRN
Status: DISCONTINUED | OUTPATIENT
Start: 2019-01-01 | End: 2019-01-01

## 2019-01-01 RX ORDER — DEXAMETHASONE 4 MG/1
4 TABLET ORAL EVERY 8 HOURS SCHEDULED
Status: COMPLETED | OUTPATIENT
Start: 2019-01-01 | End: 2019-01-01

## 2019-01-01 RX ORDER — ACETAMINOPHEN 325 MG/1
650 TABLET ORAL EVERY 4 HOURS PRN
Status: DISCONTINUED | OUTPATIENT
Start: 2019-01-01 | End: 2019-01-01 | Stop reason: HOSPADM

## 2019-01-01 RX ORDER — ONDANSETRON 4 MG/1
4-8 TABLET, ORALLY DISINTEGRATING ORAL EVERY 6 HOURS PRN
Status: DISCONTINUED | OUTPATIENT
Start: 2019-01-01 | End: 2019-01-01 | Stop reason: HOSPADM

## 2019-01-01 RX ORDER — INDOMETHACIN 50 MG/1
100 SUPPOSITORY RECTAL
Status: COMPLETED | OUTPATIENT
Start: 2019-01-01 | End: 2019-01-01

## 2019-01-01 RX ORDER — OXYCODONE HYDROCHLORIDE 5 MG/1
5 TABLET ORAL EVERY 6 HOURS PRN
Qty: 10 TABLET | Refills: 0 | Status: SHIPPED | OUTPATIENT
Start: 2019-01-01 | End: 2019-01-01

## 2019-01-01 RX ORDER — PANTOPRAZOLE SODIUM 40 MG/1
40 TABLET, DELAYED RELEASE ORAL DAILY
Qty: 8 TABLET | Refills: 0 | Status: SHIPPED | OUTPATIENT
Start: 2019-01-01 | End: 2019-01-01

## 2019-01-01 RX ORDER — CALCIUM CHLORIDE 100 MG/ML
500 INJECTION INTRAVENOUS; INTRAVENTRICULAR ONCE
Status: COMPLETED | OUTPATIENT
Start: 2019-01-01 | End: 2019-01-01

## 2019-01-01 RX ORDER — PANTOPRAZOLE SODIUM 40 MG/1
40 TABLET, DELAYED RELEASE ORAL DAILY PRN
Status: DISCONTINUED | OUTPATIENT
Start: 2019-01-01 | End: 2019-01-01 | Stop reason: HOSPADM

## 2019-01-01 RX ORDER — OXYCODONE HYDROCHLORIDE 5 MG/1
5-10 TABLET ORAL
Status: DISCONTINUED | OUTPATIENT
Start: 2019-01-01 | End: 2019-01-01

## 2019-01-01 RX ORDER — ONDANSETRON 2 MG/ML
8 INJECTION INTRAMUSCULAR; INTRAVENOUS EVERY 6 HOURS PRN
Status: DISCONTINUED | OUTPATIENT
Start: 2019-01-01 | End: 2019-01-01

## 2019-01-01 RX ORDER — CYCLOBENZAPRINE HCL 10 MG
10 TABLET ORAL 3 TIMES DAILY
Status: DISCONTINUED | OUTPATIENT
Start: 2019-01-01 | End: 2019-01-01

## 2019-01-01 RX ORDER — ERTAPENEM 1 G/1
1 INJECTION, POWDER, LYOPHILIZED, FOR SOLUTION INTRAMUSCULAR; INTRAVENOUS EVERY 24 HOURS
Status: DISCONTINUED | OUTPATIENT
Start: 2019-01-01 | End: 2019-01-01

## 2019-01-01 RX ORDER — IOPAMIDOL 755 MG/ML
500 INJECTION, SOLUTION INTRAVASCULAR ONCE
Status: COMPLETED | OUTPATIENT
Start: 2019-01-01 | End: 2019-01-01

## 2019-01-01 RX ORDER — SODIUM CHLORIDE 9 MG/ML
INJECTION, SOLUTION INTRAVENOUS CONTINUOUS
Status: DISCONTINUED | OUTPATIENT
Start: 2019-01-01 | End: 2019-01-01

## 2019-01-01 RX ORDER — FLUCONAZOLE 150 MG/1
TABLET ORAL
Refills: 0 | COMMUNITY
Start: 2018-12-28 | End: 2019-01-01

## 2019-01-01 RX ORDER — IOPAMIDOL 510 MG/ML
INJECTION, SOLUTION INTRAVASCULAR PRN
Status: DISCONTINUED | OUTPATIENT
Start: 2019-01-01 | End: 2019-01-01 | Stop reason: HOSPADM

## 2019-01-01 RX ORDER — DIMENHYDRINATE 50 MG/ML
25 INJECTION, SOLUTION INTRAMUSCULAR; INTRAVENOUS
Status: DISCONTINUED | OUTPATIENT
Start: 2019-01-01 | End: 2019-01-01 | Stop reason: HOSPADM

## 2019-01-01 RX ORDER — POTASSIUM CHLORIDE 1.5 G/1.58G
20-40 POWDER, FOR SOLUTION ORAL
Status: DISCONTINUED | OUTPATIENT
Start: 2019-01-01 | End: 2019-01-01

## 2019-01-01 RX ORDER — MORPHINE SULFATE 15 MG/1
15 TABLET, FILM COATED, EXTENDED RELEASE ORAL EVERY 12 HOURS
Qty: 120 TABLET | Refills: 0 | Status: SHIPPED | OUTPATIENT
Start: 2019-01-01

## 2019-01-01 RX ORDER — CIPROFLOXACIN 500 MG/1
500 TABLET, FILM COATED ORAL EVERY 24 HOURS
Qty: 21 TABLET | Refills: 0 | Status: SHIPPED | OUTPATIENT
Start: 2019-01-01 | End: 2019-01-01

## 2019-01-01 RX ORDER — SODIUM CHLORIDE, SODIUM LACTATE, POTASSIUM CHLORIDE, CALCIUM CHLORIDE 600; 310; 30; 20 MG/100ML; MG/100ML; MG/100ML; MG/100ML
INJECTION, SOLUTION INTRAVENOUS CONTINUOUS
Status: DISCONTINUED | OUTPATIENT
Start: 2019-01-01 | End: 2019-01-01

## 2019-01-01 RX ORDER — CALCIUM CHLORIDE 100 MG/ML
INJECTION INTRAVENOUS; INTRAVENTRICULAR PRN
Status: DISCONTINUED | OUTPATIENT
Start: 2019-01-01 | End: 2019-01-01

## 2019-01-01 RX ORDER — CALCIUM CARBONATE 500 MG/1
500 TABLET, CHEWABLE ORAL DAILY PRN
Status: DISCONTINUED | OUTPATIENT
Start: 2019-01-01 | End: 2019-01-01 | Stop reason: HOSPADM

## 2019-01-01 RX ORDER — CEFAZOLIN SODIUM 2 G/100ML
2 INJECTION, SOLUTION INTRAVENOUS
Status: DISCONTINUED | OUTPATIENT
Start: 2019-01-01 | End: 2019-01-01 | Stop reason: HOSPADM

## 2019-01-01 RX ORDER — PROCHLORPERAZINE 25 MG
25 SUPPOSITORY, RECTAL RECTAL EVERY 12 HOURS PRN
Status: DISCONTINUED | OUTPATIENT
Start: 2019-01-01 | End: 2019-01-01 | Stop reason: HOSPADM

## 2019-01-01 RX ORDER — ONDANSETRON 2 MG/ML
8 INJECTION INTRAMUSCULAR; INTRAVENOUS EVERY 6 HOURS PRN
Status: DISCONTINUED | OUTPATIENT
Start: 2019-01-01 | End: 2019-01-01 | Stop reason: HOSPADM

## 2019-01-01 RX ORDER — POTASSIUM CHLORIDE 7.45 MG/ML
10 INJECTION INTRAVENOUS ONCE
Status: COMPLETED | OUTPATIENT
Start: 2019-01-01 | End: 2019-01-01

## 2019-01-01 RX ORDER — ONDANSETRON 2 MG/ML
8 INJECTION INTRAMUSCULAR; INTRAVENOUS EVERY 6 HOURS PRN
Status: CANCELLED
Start: 2019-01-01

## 2019-01-01 RX ORDER — MORPHINE SULFATE 15 MG/1
15 TABLET, FILM COATED, EXTENDED RELEASE ORAL ONCE
Status: DISCONTINUED | OUTPATIENT
Start: 2019-01-01 | End: 2019-01-01 | Stop reason: HOSPADM

## 2019-01-01 RX ORDER — DEXAMETHASONE 4 MG/1
4 TABLET ORAL 2 TIMES DAILY WITH MEALS
Qty: 10 TABLET | Refills: 0 | Status: ON HOLD | OUTPATIENT
Start: 2019-01-01 | End: 2019-01-01

## 2019-01-01 RX ORDER — LIDOCAINE HYDROCHLORIDE 20 MG/ML
JELLY TOPICAL PRN
Status: DISCONTINUED | OUTPATIENT
Start: 2019-01-01 | End: 2019-01-01 | Stop reason: HOSPADM

## 2019-01-01 RX ORDER — OXYCODONE HYDROCHLORIDE 5 MG/1
5-10 TABLET ORAL
Status: DISCONTINUED | OUTPATIENT
Start: 2019-01-01 | End: 2019-01-01 | Stop reason: HOSPADM

## 2019-01-01 RX ORDER — AMOXICILLIN 250 MG
1-2 CAPSULE ORAL 2 TIMES DAILY PRN
Status: DISCONTINUED | OUTPATIENT
Start: 2019-01-01 | End: 2019-01-01 | Stop reason: HOSPADM

## 2019-01-01 RX ORDER — CEFTRIAXONE 2 G/1
2 INJECTION, POWDER, FOR SOLUTION INTRAMUSCULAR; INTRAVENOUS EVERY 24 HOURS
Status: DISCONTINUED | OUTPATIENT
Start: 2019-01-01 | End: 2019-01-01

## 2019-01-01 RX ORDER — DOCUSATE SODIUM 100 MG/1
100 CAPSULE, LIQUID FILLED ORAL 2 TIMES DAILY
Status: DISCONTINUED | OUTPATIENT
Start: 2019-01-01 | End: 2019-01-01

## 2019-01-01 RX ORDER — OXYCODONE HYDROCHLORIDE 5 MG/1
5-10 TABLET ORAL EVERY 6 HOURS PRN
Qty: 20 TABLET | Refills: 0 | Status: SHIPPED | OUTPATIENT
Start: 2019-01-01 | End: 2019-01-01

## 2019-01-01 RX ORDER — ONDANSETRON 4 MG/1
4 TABLET, ORALLY DISINTEGRATING ORAL EVERY 6 HOURS PRN
Qty: 20 TABLET | Refills: 0 | Status: SHIPPED | OUTPATIENT
Start: 2019-01-01

## 2019-01-01 RX ORDER — CEFTRIAXONE 1 G/1
1 INJECTION, POWDER, FOR SOLUTION INTRAMUSCULAR; INTRAVENOUS ONCE
Status: COMPLETED | OUTPATIENT
Start: 2019-01-01 | End: 2019-01-01

## 2019-01-01 RX ORDER — SODIUM CHLORIDE 9 MG/ML
1000 INJECTION, SOLUTION INTRAVENOUS CONTINUOUS
Status: DISCONTINUED | OUTPATIENT
Start: 2019-01-01 | End: 2019-01-01 | Stop reason: HOSPADM

## 2019-01-01 RX ORDER — CLOTRIMAZOLE 10 MG/1
1 LOZENGE ORAL
Status: DISCONTINUED | OUTPATIENT
Start: 2019-01-01 | End: 2019-01-01

## 2019-01-01 RX ORDER — IPRATROPIUM BROMIDE AND ALBUTEROL SULFATE 2.5; .5 MG/3ML; MG/3ML
3 SOLUTION RESPIRATORY (INHALATION) EVERY 6 HOURS PRN
Qty: 3 BOX | Refills: 0 | Status: SHIPPED | OUTPATIENT
Start: 2019-01-01

## 2019-01-01 RX ORDER — PROCHLORPERAZINE MALEATE 10 MG
10 TABLET ORAL EVERY 6 HOURS PRN
Qty: 30 TABLET | Refills: 1 | Status: SHIPPED | OUTPATIENT
Start: 2019-01-01

## 2019-01-01 RX ORDER — HEPARIN SODIUM,PORCINE 10 UNIT/ML
5-10 VIAL (ML) INTRAVENOUS
Status: DISCONTINUED | OUTPATIENT
Start: 2019-01-01 | End: 2019-01-01 | Stop reason: HOSPADM

## 2019-01-01 RX ORDER — IPRATROPIUM BROMIDE AND ALBUTEROL SULFATE 2.5; .5 MG/3ML; MG/3ML
3 SOLUTION RESPIRATORY (INHALATION) EVERY 6 HOURS PRN
Status: DISCONTINUED | OUTPATIENT
Start: 2019-01-01 | End: 2019-01-01

## 2019-01-01 RX ORDER — CEFAZOLIN SODIUM 1 G/3ML
1 INJECTION, POWDER, FOR SOLUTION INTRAMUSCULAR; INTRAVENOUS SEE ADMIN INSTRUCTIONS
Status: DISCONTINUED | OUTPATIENT
Start: 2019-01-01 | End: 2019-01-01 | Stop reason: HOSPADM

## 2019-01-01 RX ORDER — LIDOCAINE HYDROCHLORIDE 20 MG/ML
INJECTION, SOLUTION INFILTRATION; PERINEURAL PRN
Status: DISCONTINUED | OUTPATIENT
Start: 2019-01-01 | End: 2019-01-01

## 2019-01-01 RX ORDER — PANTOPRAZOLE SODIUM 40 MG/1
40 TABLET, DELAYED RELEASE ORAL DAILY PRN
COMMUNITY

## 2019-01-01 RX ORDER — LOPERAMIDE HCL 2 MG
2 CAPSULE ORAL 4 TIMES DAILY PRN
Status: DISCONTINUED | OUTPATIENT
Start: 2019-01-01 | End: 2019-01-01 | Stop reason: HOSPADM

## 2019-01-01 RX ORDER — MORPHINE SULFATE 15 MG/1
15 TABLET, FILM COATED, EXTENDED RELEASE ORAL EVERY 12 HOURS
Qty: 120 TABLET | Refills: 0 | Status: SHIPPED | OUTPATIENT
Start: 2019-01-01 | End: 2019-01-01

## 2019-01-01 RX ORDER — CIPROFLOXACIN 500 MG/1
500 TABLET, FILM COATED ORAL EVERY 24 HOURS
Qty: 21 TABLET | Refills: 0 | Status: SHIPPED | OUTPATIENT
Start: 2019-01-01

## 2019-01-01 RX ORDER — MEPERIDINE HYDROCHLORIDE 50 MG/ML
12.5 INJECTION INTRAMUSCULAR; INTRAVENOUS; SUBCUTANEOUS
Status: DISCONTINUED | OUTPATIENT
Start: 2019-01-01 | End: 2019-01-01 | Stop reason: HOSPADM

## 2019-01-01 RX ORDER — PIPERACILLIN SODIUM, TAZOBACTAM SODIUM 3; .375 G/15ML; G/15ML
3.38 INJECTION, POWDER, LYOPHILIZED, FOR SOLUTION INTRAVENOUS EVERY 6 HOURS
Status: DISCONTINUED | OUTPATIENT
Start: 2019-01-01 | End: 2019-01-01

## 2019-01-01 RX ORDER — OXYCODONE HYDROCHLORIDE 5 MG/1
5 TABLET ORAL EVERY 4 HOURS PRN
Status: DISCONTINUED | OUTPATIENT
Start: 2019-01-01 | End: 2019-01-01 | Stop reason: HOSPADM

## 2019-01-01 RX ORDER — FENTANYL CITRATE 50 UG/ML
25-50 INJECTION, SOLUTION INTRAMUSCULAR; INTRAVENOUS EVERY 5 MIN PRN
Status: DISCONTINUED | OUTPATIENT
Start: 2019-01-01 | End: 2019-01-01 | Stop reason: HOSPADM

## 2019-01-01 RX ORDER — PIPERACILLIN SODIUM, TAZOBACTAM SODIUM 3; .375 G/15ML; G/15ML
3.38 INJECTION, POWDER, LYOPHILIZED, FOR SOLUTION INTRAVENOUS ONCE
Status: COMPLETED | OUTPATIENT
Start: 2019-01-01 | End: 2019-01-01

## 2019-01-01 RX ORDER — BISACODYL 10 MG
10 SUPPOSITORY, RECTAL RECTAL DAILY PRN
Status: DISCONTINUED | OUTPATIENT
Start: 2019-01-01 | End: 2019-01-01 | Stop reason: HOSPADM

## 2019-01-01 RX ORDER — NYSTATIN 100000/ML
500000 SUSPENSION, ORAL (FINAL DOSE FORM) ORAL 4 TIMES DAILY
Qty: 473 ML | Refills: 0 | Status: ON HOLD | OUTPATIENT
Start: 2019-01-01 | End: 2019-01-01

## 2019-01-01 RX ORDER — SODIUM CHLORIDE AND POTASSIUM CHLORIDE 150; 450 MG/100ML; MG/100ML
INJECTION, SOLUTION INTRAVENOUS
Status: DISCONTINUED
Start: 2019-01-01 | End: 2019-01-01 | Stop reason: HOSPADM

## 2019-01-01 RX ORDER — NOREPINEPHRINE BITARTRATE 0.06 MG/ML
.03-.4 INJECTION, SOLUTION INTRAVENOUS CONTINUOUS
Status: DISCONTINUED | OUTPATIENT
Start: 2019-01-01 | End: 2019-01-01 | Stop reason: HOSPADM

## 2019-01-01 RX ORDER — POTASSIUM CHLORIDE 750 MG/1
40 TABLET, EXTENDED RELEASE ORAL ONCE
Qty: 4 TABLET | Refills: 0 | Status: ON HOLD | OUTPATIENT
Start: 2019-01-01 | End: 2019-01-01

## 2019-01-01 RX ORDER — MAGNESIUM SULFATE HEPTAHYDRATE 40 MG/ML
4 INJECTION, SOLUTION INTRAVENOUS EVERY 4 HOURS PRN
Status: DISCONTINUED | OUTPATIENT
Start: 2019-01-01 | End: 2019-01-01 | Stop reason: ALTCHOICE

## 2019-01-01 RX ORDER — MAGNESIUM SULFATE HEPTAHYDRATE 40 MG/ML
4 INJECTION, SOLUTION INTRAVENOUS ONCE
Status: DISCONTINUED | OUTPATIENT
Start: 2019-01-01 | End: 2019-01-01

## 2019-01-01 RX ORDER — MORPHINE SULFATE 15 MG/1
15 TABLET, FILM COATED, EXTENDED RELEASE ORAL EVERY 12 HOURS
Qty: 60 TABLET | Refills: 0 | Status: ON HOLD | OUTPATIENT
Start: 2019-01-01 | End: 2019-01-01

## 2019-01-01 RX ORDER — SODIUM CHLORIDE AND POTASSIUM CHLORIDE 150; 900 MG/100ML; MG/100ML
INJECTION, SOLUTION INTRAVENOUS CONTINUOUS
Status: DISCONTINUED | OUTPATIENT
Start: 2019-01-01 | End: 2019-01-01 | Stop reason: HOSPADM

## 2019-01-01 RX ORDER — POLYETHYLENE GLYCOL 3350 17 G/17G
17 POWDER, FOR SOLUTION ORAL 2 TIMES DAILY PRN
Status: DISCONTINUED | OUTPATIENT
Start: 2019-01-01 | End: 2019-01-01 | Stop reason: HOSPADM

## 2019-01-01 RX ORDER — PROCHLORPERAZINE MALEATE 5 MG
10 TABLET ORAL EVERY 6 HOURS PRN
Status: DISCONTINUED | OUTPATIENT
Start: 2019-01-01 | End: 2019-01-01

## 2019-01-01 RX ORDER — POLYETHYLENE GLYCOL 3350 17 G/17G
17 POWDER, FOR SOLUTION ORAL 3 TIMES DAILY
Status: DISCONTINUED | OUTPATIENT
Start: 2019-01-01 | End: 2019-01-01 | Stop reason: HOSPADM

## 2019-01-01 RX ORDER — IOPAMIDOL 755 MG/ML
77 INJECTION, SOLUTION INTRAVASCULAR ONCE
Status: COMPLETED | OUTPATIENT
Start: 2019-01-01 | End: 2019-01-01

## 2019-01-01 RX ORDER — FERROUS SULFATE 325(65) MG
324 TABLET ORAL
Status: DISCONTINUED | OUTPATIENT
Start: 2019-01-01 | End: 2019-01-01 | Stop reason: HOSPADM

## 2019-01-01 RX ORDER — DEXAMETHASONE SODIUM PHOSPHATE 10 MG/ML
4 INJECTION, SOLUTION INTRAMUSCULAR; INTRAVENOUS EVERY 6 HOURS
Status: DISCONTINUED | OUTPATIENT
Start: 2019-01-01 | End: 2019-01-01

## 2019-01-01 RX ORDER — CIPROFLOXACIN 500 MG/1
500 TABLET, FILM COATED ORAL EVERY 12 HOURS SCHEDULED
Status: DISCONTINUED | OUTPATIENT
Start: 2019-01-01 | End: 2019-01-01 | Stop reason: HOSPADM

## 2019-01-01 RX ORDER — CALCIUM CARBONATE 500 MG/1
500 TABLET, CHEWABLE ORAL DAILY PRN
Status: DISCONTINUED | OUTPATIENT
Start: 2019-01-01 | End: 2019-01-01

## 2019-01-01 RX ORDER — LORAZEPAM 1 MG/1
1 TABLET ORAL EVERY 6 HOURS PRN
Status: DISCONTINUED | OUTPATIENT
Start: 2019-01-01 | End: 2019-01-01 | Stop reason: HOSPADM

## 2019-01-01 RX ORDER — SODIUM CHLORIDE 9 MG/ML
INJECTION, SOLUTION INTRAVENOUS CONTINUOUS
Status: CANCELLED | OUTPATIENT
Start: 2019-01-01

## 2019-01-01 RX ORDER — ONDANSETRON 4 MG/1
4-8 TABLET, ORALLY DISINTEGRATING ORAL EVERY 6 HOURS PRN
Status: DISCONTINUED | OUTPATIENT
Start: 2019-01-01 | End: 2019-01-01

## 2019-01-01 RX ORDER — MAGNESIUM SULFATE HEPTAHYDRATE 40 MG/ML
2 INJECTION, SOLUTION INTRAVENOUS ONCE
Status: DISCONTINUED | OUTPATIENT
Start: 2019-01-01 | End: 2019-01-01

## 2019-01-01 RX ORDER — POTASSIUM CL/LIDO/0.9 % NACL 10MEQ/0.1L
10 INTRAVENOUS SOLUTION, PIGGYBACK (ML) INTRAVENOUS
Status: DISCONTINUED | OUTPATIENT
Start: 2019-01-01 | End: 2019-01-01

## 2019-01-01 RX ORDER — TAMSULOSIN HYDROCHLORIDE 0.4 MG/1
0.4 CAPSULE ORAL DAILY
Qty: 30 CAPSULE | Refills: 11 | Status: SHIPPED | OUTPATIENT
Start: 2019-01-01

## 2019-01-01 RX ORDER — SODIUM CHLORIDE 9 MG/ML
INJECTION, SOLUTION INTRAVENOUS ONCE
Status: COMPLETED | OUTPATIENT
Start: 2019-01-01 | End: 2019-01-01

## 2019-01-01 RX ORDER — ALBUTEROL SULFATE 0.83 MG/ML
2.5 SOLUTION RESPIRATORY (INHALATION) ONCE
Status: COMPLETED | OUTPATIENT
Start: 2019-01-01 | End: 2019-01-01

## 2019-01-01 RX ORDER — LEVOTHYROXINE SODIUM 25 UG/1
50 TABLET ORAL DAILY
Status: DISCONTINUED | OUTPATIENT
Start: 2019-01-01 | End: 2019-01-01 | Stop reason: HOSPADM

## 2019-01-01 RX ORDER — CALCIUM CARBONATE 500 MG/1
500 TABLET, CHEWABLE ORAL 3 TIMES DAILY PRN
Status: DISCONTINUED | OUTPATIENT
Start: 2019-01-01 | End: 2019-01-01 | Stop reason: HOSPADM

## 2019-01-01 RX ORDER — ACETAMINOPHEN 325 MG/1
975 TABLET ORAL ONCE
Status: DISCONTINUED | OUTPATIENT
Start: 2019-01-01 | End: 2019-01-01 | Stop reason: HOSPADM

## 2019-01-01 RX ORDER — POTASSIUM CHLORIDE 1500 MG/1
40 TABLET, EXTENDED RELEASE ORAL ONCE
Status: DISCONTINUED | OUTPATIENT
Start: 2019-01-01 | End: 2019-01-01

## 2019-01-01 RX ORDER — ERTAPENEM 1 G/1
INJECTION, POWDER, LYOPHILIZED, FOR SOLUTION INTRAMUSCULAR; INTRAVENOUS PRN
Status: DISCONTINUED | OUTPATIENT
Start: 2019-01-01 | End: 2019-01-01

## 2019-01-01 RX ORDER — LIDOCAINE HYDROCHLORIDE 10 MG/ML
INJECTION, SOLUTION INFILTRATION; PERINEURAL
Status: COMPLETED
Start: 2019-01-01 | End: 2019-01-01

## 2019-01-01 RX ORDER — ONDANSETRON 4 MG/1
4 TABLET, ORALLY DISINTEGRATING ORAL EVERY 30 MIN PRN
Status: DISCONTINUED | OUTPATIENT
Start: 2019-01-01 | End: 2019-01-01

## 2019-01-01 RX ORDER — DIPHENOXYLATE HCL/ATROPINE 2.5-.025MG
2 TABLET ORAL 4 TIMES DAILY PRN
Status: DISCONTINUED | OUTPATIENT
Start: 2019-01-01 | End: 2019-01-01 | Stop reason: HOSPADM

## 2019-01-01 RX ORDER — LORAZEPAM 0.5 MG/1
1 TABLET ORAL EVERY 6 HOURS PRN
Status: DISCONTINUED | OUTPATIENT
Start: 2019-01-01 | End: 2019-01-01 | Stop reason: HOSPADM

## 2019-01-01 RX ORDER — PROCHLORPERAZINE MALEATE 10 MG
10 TABLET ORAL EVERY 6 HOURS PRN
Status: DISCONTINUED | OUTPATIENT
Start: 2019-01-01 | End: 2019-01-01 | Stop reason: HOSPADM

## 2019-01-01 RX ORDER — OXYCODONE HYDROCHLORIDE 5 MG/1
5 TABLET ORAL EVERY 6 HOURS PRN
Qty: 60 TABLET | Refills: 0 | Status: SHIPPED | OUTPATIENT
Start: 2019-01-01 | End: 2019-01-01

## 2019-01-01 RX ORDER — DEXAMETHASONE 1 MG
2 TABLET ORAL EVERY 8 HOURS SCHEDULED
Status: DISCONTINUED | OUTPATIENT
Start: 2019-01-01 | End: 2019-01-01 | Stop reason: HOSPADM

## 2019-01-01 RX ORDER — ALBUMIN, HUMAN INJ 5% 5 %
SOLUTION INTRAVENOUS CONTINUOUS PRN
Status: DISCONTINUED | OUTPATIENT
Start: 2019-01-01 | End: 2019-01-01

## 2019-01-01 RX ORDER — ASPIRIN 81 MG/1
81 TABLET ORAL DAILY
Status: DISCONTINUED | OUTPATIENT
Start: 2019-01-01 | End: 2019-01-01 | Stop reason: HOSPADM

## 2019-01-01 RX ADMIN — TAMSULOSIN HYDROCHLORIDE 0.4 MG: 0.4 CAPSULE ORAL at 08:07

## 2019-01-01 RX ADMIN — OXYCODONE HYDROCHLORIDE 5 MG: 5 TABLET ORAL at 06:52

## 2019-01-01 RX ADMIN — DIPHENHYDRAMINE HYDROCHLORIDE 25 MG: 25 CAPSULE ORAL at 11:06

## 2019-01-01 RX ADMIN — POLYETHYLENE GLYCOL 3350 17 G: 17 POWDER, FOR SOLUTION ORAL at 14:03

## 2019-01-01 RX ADMIN — TAZOBACTAM SODIUM AND PIPERACILLIN SODIUM 4.5 G: 500; 4 INJECTION, SOLUTION INTRAVENOUS at 00:39

## 2019-01-01 RX ADMIN — SODIUM CHLORIDE 1000 ML: 9 INJECTION, SOLUTION INTRAVENOUS at 22:20

## 2019-01-01 RX ADMIN — LEVOFLOXACIN 500 MG: 5 INJECTION, SOLUTION INTRAVENOUS at 13:00

## 2019-01-01 RX ADMIN — MORPHINE SULFATE 15 MG: 15 TABLET, EXTENDED RELEASE ORAL at 08:07

## 2019-01-01 RX ADMIN — Medication 500 UNITS: at 11:25

## 2019-01-01 RX ADMIN — SODIUM CHLORIDE 250 ML: 9 INJECTION, SOLUTION INTRAVENOUS at 10:37

## 2019-01-01 RX ADMIN — DIPHENHYDRAMINE HYDROCHLORIDE 25 MG: 50 INJECTION, SOLUTION INTRAMUSCULAR; INTRAVENOUS at 11:02

## 2019-01-01 RX ADMIN — SENNOSIDES AND DOCUSATE SODIUM 2 TABLET: 8.6; 5 TABLET ORAL at 19:00

## 2019-01-01 RX ADMIN — MORPHINE SULFATE 15 MG: 15 TABLET, EXTENDED RELEASE ORAL at 21:27

## 2019-01-01 RX ADMIN — DIPHENHYDRAMINE HYDROCHLORIDE 25 MG: 25 CAPSULE ORAL at 10:31

## 2019-01-01 RX ADMIN — PROPOFOL 100 MG: 10 INJECTION, EMULSION INTRAVENOUS at 13:20

## 2019-01-01 RX ADMIN — PHENYLEPHRINE HYDROCHLORIDE 100 MCG: 10 INJECTION INTRAVENOUS at 14:15

## 2019-01-01 RX ADMIN — MAGNESIUM SULFATE IN WATER 2 G: 40 INJECTION, SOLUTION INTRAVENOUS at 11:38

## 2019-01-01 RX ADMIN — DEXAMETHASONE 4 MG: 4 TABLET ORAL at 21:13

## 2019-01-01 RX ADMIN — Medication 400 MG: at 21:09

## 2019-01-01 RX ADMIN — DEXAMETHASONE SODIUM PHOSPHATE 12 MG: 10 INJECTION, SOLUTION INTRAMUSCULAR; INTRAVENOUS at 14:03

## 2019-01-01 RX ADMIN — Medication 5 ML: at 12:56

## 2019-01-01 RX ADMIN — ACETAMINOPHEN 650 MG: 325 TABLET, FILM COATED ORAL at 14:18

## 2019-01-01 RX ADMIN — Medication 500 MG: at 08:23

## 2019-01-01 RX ADMIN — SENNOSIDES AND DOCUSATE SODIUM 2 TABLET: 8.6; 5 TABLET ORAL at 20:00

## 2019-01-01 RX ADMIN — LEVOTHYROXINE SODIUM 50 MCG: 50 TABLET ORAL at 08:40

## 2019-01-01 RX ADMIN — TAZOBACTAM SODIUM AND PIPERACILLIN SODIUM 4.5 G: 500; 4 INJECTION, SOLUTION INTRAVENOUS at 17:37

## 2019-01-01 RX ADMIN — SENNOSIDES AND DOCUSATE SODIUM 1 TABLET: 8.6; 5 TABLET ORAL at 08:14

## 2019-01-01 RX ADMIN — OXYCODONE HYDROCHLORIDE 5 MG: 5 TABLET ORAL at 08:50

## 2019-01-01 RX ADMIN — TAMSULOSIN HYDROCHLORIDE 0.4 MG: 0.4 CAPSULE ORAL at 08:34

## 2019-01-01 RX ADMIN — SODIUM CHLORIDE, PRESERVATIVE FREE 5 ML: 5 INJECTION INTRAVENOUS at 12:53

## 2019-01-01 RX ADMIN — DEXAMETHASONE SODIUM PHOSPHATE 4 MG: 4 INJECTION, SOLUTION INTRAMUSCULAR; INTRAVENOUS at 10:47

## 2019-01-01 RX ADMIN — Medication 400 MG: at 08:23

## 2019-01-01 RX ADMIN — LEVOTHYROXINE SODIUM 50 MCG: 0.03 TABLET ORAL at 14:33

## 2019-01-01 RX ADMIN — IPRATROPIUM BROMIDE AND ALBUTEROL SULFATE 3 ML: .5; 3 SOLUTION RESPIRATORY (INHALATION) at 07:38

## 2019-01-01 RX ADMIN — SODIUM CHLORIDE 250 ML: 9 INJECTION, SOLUTION INTRAVENOUS at 11:26

## 2019-01-01 RX ADMIN — DEXAMETHASONE SODIUM PHOSPHATE 12 MG: 10 INJECTION, SOLUTION INTRAMUSCULAR; INTRAVENOUS at 10:39

## 2019-01-01 RX ADMIN — GADOBUTROL 7.5 ML: 604.72 INJECTION INTRAVENOUS at 20:26

## 2019-01-01 RX ADMIN — SODIUM CHLORIDE: 9 INJECTION, SOLUTION INTRAVENOUS at 11:20

## 2019-01-01 RX ADMIN — CIPROFLOXACIN 400 MG: 2 INJECTION, SOLUTION INTRAVENOUS at 09:34

## 2019-01-01 RX ADMIN — LEVOTHYROXINE SODIUM 50 MCG: 50 TABLET ORAL at 08:29

## 2019-01-01 RX ADMIN — CALCIUM CARBONATE (ANTACID) CHEW TAB 500 MG 500 MG: 500 CHEW TAB at 21:19

## 2019-01-01 RX ADMIN — PHENYLEPHRINE HYDROCHLORIDE 100 MCG: 10 INJECTION INTRAVENOUS at 14:01

## 2019-01-01 RX ADMIN — MAGNESIUM SULFATE IN WATER 2 G: 40 INJECTION, SOLUTION INTRAVENOUS at 09:01

## 2019-01-01 RX ADMIN — POTASSIUM CHLORIDE AND SODIUM CHLORIDE: 900; 150 INJECTION, SOLUTION INTRAVENOUS at 16:12

## 2019-01-01 RX ADMIN — DIPHENHYDRAMINE HYDROCHLORIDE 25 MG: 50 INJECTION, SOLUTION INTRAMUSCULAR; INTRAVENOUS at 10:32

## 2019-01-01 RX ADMIN — CLOTRIMAZOLE 1 TROCHE: 10 LOZENGE ORAL at 16:39

## 2019-01-01 RX ADMIN — Medication 5 ML: at 12:41

## 2019-01-01 RX ADMIN — FAMOTIDINE 40 MG: 10 INJECTION INTRAVENOUS at 10:57

## 2019-01-01 RX ADMIN — CLOTRIMAZOLE 1 TROCHE: 10 LOZENGE ORAL at 08:14

## 2019-01-01 RX ADMIN — IPRATROPIUM BROMIDE AND ALBUTEROL SULFATE 3 ML: .5; 3 SOLUTION RESPIRATORY (INHALATION) at 08:00

## 2019-01-01 RX ADMIN — LIDOCAINE HYDROCHLORIDE 10 ML: 10 INJECTION, SOLUTION EPIDURAL; INFILTRATION; INTRACAUDAL; PERINEURAL at 14:55

## 2019-01-01 RX ADMIN — SODIUM CHLORIDE 250 ML: 9 INJECTION, SOLUTION INTRAVENOUS at 10:30

## 2019-01-01 RX ADMIN — LEVOTHYROXINE SODIUM 50 MCG: 0.03 TABLET ORAL at 11:10

## 2019-01-01 RX ADMIN — ASPIRIN 81 MG: 81 TABLET ORAL at 08:23

## 2019-01-01 RX ADMIN — AMOXICILLIN AND CLAVULANATE POTASSIUM 1 TABLET: 875; 125 TABLET, FILM COATED ORAL at 08:04

## 2019-01-01 RX ADMIN — LIDOCAINE HYDROCHLORIDE 10 ML: 10 INJECTION, SOLUTION EPIDURAL; INFILTRATION; INTRACAUDAL; PERINEURAL at 11:50

## 2019-01-01 RX ADMIN — IPRATROPIUM BROMIDE AND ALBUTEROL SULFATE 3 ML: .5; 3 SOLUTION RESPIRATORY (INHALATION) at 03:09

## 2019-01-01 RX ADMIN — SODIUM CHLORIDE 250 ML: 9 INJECTION, SOLUTION INTRAVENOUS at 10:34

## 2019-01-01 RX ADMIN — OXYCODONE HYDROCHLORIDE 10 MG: 5 TABLET ORAL at 19:32

## 2019-01-01 RX ADMIN — CLOTRIMAZOLE 1 TROCHE: 10 LOZENGE ORAL at 17:02

## 2019-01-01 RX ADMIN — POTASSIUM CHLORIDE 20 MEQ: 29.8 INJECTION, SOLUTION INTRAVENOUS at 06:50

## 2019-01-01 RX ADMIN — PHENYLEPHRINE HYDROCHLORIDE 100 MCG: 10 INJECTION INTRAVENOUS at 12:01

## 2019-01-01 RX ADMIN — SODIUM CHLORIDE 1000 ML: 9 INJECTION, SOLUTION INTRAVENOUS at 10:14

## 2019-01-01 RX ADMIN — SODIUM CHLORIDE 500 ML: 9 INJECTION, SOLUTION INTRAVENOUS at 14:46

## 2019-01-01 RX ADMIN — LEVOTHYROXINE SODIUM 50 MCG: 0.03 TABLET ORAL at 09:11

## 2019-01-01 RX ADMIN — ASPIRIN 81 MG: 81 TABLET ORAL at 22:12

## 2019-01-01 RX ADMIN — LIDOCAINE HYDROCHLORIDE 60 ML: 10 INJECTION, SOLUTION INFILTRATION; PERINEURAL at 12:31

## 2019-01-01 RX ADMIN — AMOXICILLIN AND CLAVULANATE POTASSIUM 1 TABLET: 875; 125 TABLET, FILM COATED ORAL at 20:06

## 2019-01-01 RX ADMIN — DRONABINOL 2.5 MG: 2.5 CAPSULE ORAL at 16:43

## 2019-01-01 RX ADMIN — IPRATROPIUM BROMIDE AND ALBUTEROL SULFATE 3 ML: .5; 3 SOLUTION RESPIRATORY (INHALATION) at 21:39

## 2019-01-01 RX ADMIN — SODIUM CHLORIDE, PRESERVATIVE FREE 500 UNITS: 5 INJECTION INTRAVENOUS at 11:22

## 2019-01-01 RX ADMIN — MIDAZOLAM 1 MG: 1 INJECTION INTRAMUSCULAR; INTRAVENOUS at 09:44

## 2019-01-01 RX ADMIN — POLYETHYLENE GLYCOL 3350 17 G: 17 POWDER, FOR SOLUTION ORAL at 08:40

## 2019-01-01 RX ADMIN — PROPOFOL 20 MG: 10 INJECTION, EMULSION INTRAVENOUS at 09:41

## 2019-01-01 RX ADMIN — DRONABINOL 2.5 MG: 2.5 CAPSULE ORAL at 16:13

## 2019-01-01 RX ADMIN — MAGNESIUM OXIDE TAB 400 MG (241.3 MG ELEMENTAL MG) 400 MG: 400 (241.3 MG) TAB at 08:40

## 2019-01-01 RX ADMIN — FERROUS SULFATE TAB 325 MG (65 MG ELEMENTAL FE) 325 MG: 325 (65 FE) TAB at 17:10

## 2019-01-01 RX ADMIN — Medication 1 TABLET: at 11:10

## 2019-01-01 RX ADMIN — FENTANYL CITRATE 50 MCG: 50 INJECTION, SOLUTION INTRAMUSCULAR; INTRAVENOUS at 13:42

## 2019-01-01 RX ADMIN — SODIUM CHLORIDE 1000 ML: 9 INJECTION, SOLUTION INTRAVENOUS at 08:36

## 2019-01-01 RX ADMIN — MORPHINE SULFATE 15 MG: 15 TABLET, EXTENDED RELEASE ORAL at 08:29

## 2019-01-01 RX ADMIN — DEXAMETHASONE SODIUM PHOSPHATE 12 MG: 10 INJECTION, SOLUTION INTRAMUSCULAR; INTRAVENOUS at 11:28

## 2019-01-01 RX ADMIN — PHENYLEPHRINE HYDROCHLORIDE 100 MCG: 10 INJECTION INTRAVENOUS at 08:22

## 2019-01-01 RX ADMIN — FAMOTIDINE 40 MG: 10 INJECTION, SOLUTION INTRAVENOUS at 12:25

## 2019-01-01 RX ADMIN — IPRATROPIUM BROMIDE AND ALBUTEROL SULFATE 3 ML: .5; 3 SOLUTION RESPIRATORY (INHALATION) at 08:49

## 2019-01-01 RX ADMIN — POLYETHYLENE GLYCOL 3350 17 G: 17 POWDER, FOR SOLUTION ORAL at 19:33

## 2019-01-01 RX ADMIN — NOREPINEPHRINE BITARTRATE 6.4 MCG: 1 INJECTION INTRAVENOUS at 09:25

## 2019-01-01 RX ADMIN — MAGNESIUM SULFATE IN WATER 4 G: 40 INJECTION, SOLUTION INTRAVENOUS at 11:24

## 2019-01-01 RX ADMIN — MAGNESIUM SULFATE HEPTAHYDRATE 2 G: 500 INJECTION, SOLUTION INTRAMUSCULAR; INTRAVENOUS at 09:48

## 2019-01-01 RX ADMIN — FAMOTIDINE 40 MG: 10 INJECTION, SOLUTION INTRAVENOUS at 10:30

## 2019-01-01 RX ADMIN — SODIUM CHLORIDE 250 ML: 9 INJECTION, SOLUTION INTRAVENOUS at 12:19

## 2019-01-01 RX ADMIN — CLOTRIMAZOLE 1 TROCHE: 10 LOZENGE ORAL at 18:12

## 2019-01-01 RX ADMIN — POTASSIUM CHLORIDE 40 MEQ: 1500 TABLET, EXTENDED RELEASE ORAL at 22:36

## 2019-01-01 RX ADMIN — DIPHENHYDRAMINE HYDROCHLORIDE 25 MG: 50 INJECTION INTRAMUSCULAR; INTRAVENOUS at 10:47

## 2019-01-01 RX ADMIN — CLOTRIMAZOLE 1 TROCHE: 10 LOZENGE ORAL at 13:48

## 2019-01-01 RX ADMIN — POLYETHYLENE GLYCOL 3350 17 G: 17 POWDER, FOR SOLUTION ORAL at 15:58

## 2019-01-01 RX ADMIN — PACLITAXEL 138 MG: 6 INJECTION, SOLUTION INTRAVENOUS at 11:48

## 2019-01-01 RX ADMIN — SODIUM CHLORIDE 250 ML: 9 INJECTION, SOLUTION INTRAVENOUS at 10:33

## 2019-01-01 RX ADMIN — PANTOPRAZOLE SODIUM 40 MG: 40 TABLET, DELAYED RELEASE ORAL at 08:04

## 2019-01-01 RX ADMIN — FAMOTIDINE 40 MG: 10 INJECTION INTRAVENOUS at 11:05

## 2019-01-01 RX ADMIN — OXYCODONE HYDROCHLORIDE 5 MG: 5 TABLET ORAL at 05:08

## 2019-01-01 RX ADMIN — DEXAMETHASONE SODIUM PHOSPHATE 4 MG: 10 INJECTION, SOLUTION INTRAMUSCULAR; INTRAVENOUS at 19:34

## 2019-01-01 RX ADMIN — PIPERACILLIN SODIUM AND TAZOBACTAM SODIUM 3.38 G: 3; .375 INJECTION, POWDER, LYOPHILIZED, FOR SOLUTION INTRAVENOUS at 14:53

## 2019-01-01 RX ADMIN — FAMOTIDINE 40 MG: 10 INJECTION, SOLUTION INTRAVENOUS at 11:00

## 2019-01-01 RX ADMIN — DRONABINOL 2.5 MG: 2.5 CAPSULE ORAL at 08:40

## 2019-01-01 RX ADMIN — DRONABINOL 2.5 MG: 2.5 CAPSULE ORAL at 16:29

## 2019-01-01 RX ADMIN — SODIUM CHLORIDE, POTASSIUM CHLORIDE, SODIUM LACTATE AND CALCIUM CHLORIDE: 600; 310; 30; 20 INJECTION, SOLUTION INTRAVENOUS at 07:38

## 2019-01-01 RX ADMIN — TAMSULOSIN HYDROCHLORIDE 0.4 MG: 0.4 CAPSULE ORAL at 08:23

## 2019-01-01 RX ADMIN — SENNOSIDES AND DOCUSATE SODIUM 1 TABLET: 8.6; 5 TABLET ORAL at 13:08

## 2019-01-01 RX ADMIN — DEXAMETHASONE 4 MG: 4 TABLET ORAL at 13:28

## 2019-01-01 RX ADMIN — MAGNESIUM SULFATE IN WATER 2 G: 40 INJECTION, SOLUTION INTRAVENOUS at 14:01

## 2019-01-01 RX ADMIN — POLYETHYLENE GLYCOL 3350 17 G: 17 POWDER, FOR SOLUTION ORAL at 20:49

## 2019-01-01 RX ADMIN — GEMCITABINE 1400 MG: 38 INJECTION, SOLUTION INTRAVENOUS at 11:17

## 2019-01-01 RX ADMIN — TAZOBACTAM SODIUM AND PIPERACILLIN SODIUM 4.5 G: 500; 4 INJECTION, SOLUTION INTRAVENOUS at 10:02

## 2019-01-01 RX ADMIN — IPRATROPIUM BROMIDE AND ALBUTEROL SULFATE 3 ML: .5; 3 SOLUTION RESPIRATORY (INHALATION) at 09:58

## 2019-01-01 RX ADMIN — PHENYLEPHRINE HYDROCHLORIDE 50 MCG: 10 INJECTION INTRAVENOUS at 13:58

## 2019-01-01 RX ADMIN — PANTOPRAZOLE SODIUM 40 MG: 40 TABLET, DELAYED RELEASE ORAL at 11:10

## 2019-01-01 RX ADMIN — PROPOFOL 50 MCG/KG/MIN: 10 INJECTION, EMULSION INTRAVENOUS at 11:53

## 2019-01-01 RX ADMIN — DRONABINOL 2.5 MG: 2.5 CAPSULE ORAL at 17:09

## 2019-01-01 RX ADMIN — MAGNESIUM SULFATE IN WATER 2 G: 40 INJECTION, SOLUTION INTRAVENOUS at 13:01

## 2019-01-01 RX ADMIN — SODIUM CHLORIDE 1000 ML: 9 INJECTION, SOLUTION INTRAVENOUS at 12:55

## 2019-01-01 RX ADMIN — DRONABINOL 2.5 MG: 2.5 CAPSULE ORAL at 06:48

## 2019-01-01 RX ADMIN — SODIUM CHLORIDE, POTASSIUM CHLORIDE, SODIUM LACTATE AND CALCIUM CHLORIDE: 600; 310; 30; 20 INJECTION, SOLUTION INTRAVENOUS at 12:59

## 2019-01-01 RX ADMIN — Medication 500 UNITS: at 16:36

## 2019-01-01 RX ADMIN — MAGNESIUM SULFATE IN WATER 4 G: 40 INJECTION, SOLUTION INTRAVENOUS at 10:53

## 2019-01-01 RX ADMIN — SODIUM CHLORIDE 250 ML: 9 INJECTION, SOLUTION INTRAVENOUS at 10:49

## 2019-01-01 RX ADMIN — GADOBUTROL 7.5 ML: 604.72 INJECTION INTRAVENOUS at 05:49

## 2019-01-01 RX ADMIN — DEXAMETHASONE SODIUM PHOSPHATE 12 MG: 10 INJECTION, SOLUTION INTRAMUSCULAR; INTRAVENOUS at 10:54

## 2019-01-01 RX ADMIN — POTASSIUM CHLORIDE 20 MEQ: 1500 TABLET, EXTENDED RELEASE ORAL at 00:24

## 2019-01-01 RX ADMIN — IOPAMIDOL 73 ML: 755 INJECTION, SOLUTION INTRAVENOUS at 22:43

## 2019-01-01 RX ADMIN — Medication 1 TABLET: at 09:12

## 2019-01-01 RX ADMIN — NOREPINEPHRINE BITARTRATE 6.4 MCG: 1 INJECTION INTRAVENOUS at 09:33

## 2019-01-01 RX ADMIN — GADOBUTROL 7.5 ML: 604.72 INJECTION INTRAVENOUS at 08:24

## 2019-01-01 RX ADMIN — DRONABINOL 2.5 MG: 2.5 CAPSULE ORAL at 08:07

## 2019-01-01 RX ADMIN — SODIUM PHOSPHATE, MONOBASIC, MONOHYDRATE AND SODIUM PHOSPHATE, DIBASIC, ANHYDROUS 15 MMOL: 276; 142 INJECTION, SOLUTION INTRAVENOUS at 04:47

## 2019-01-01 RX ADMIN — DIPHENHYDRAMINE HYDROCHLORIDE 25 MG: 25 CAPSULE ORAL at 10:54

## 2019-01-01 RX ADMIN — DEXAMETHASONE SODIUM PHOSPHATE 4 MG: 10 INJECTION, SOLUTION INTRAMUSCULAR; INTRAVENOUS at 19:00

## 2019-01-01 RX ADMIN — DEXAMETHASONE SODIUM PHOSPHATE 4 MG: 10 INJECTION, SOLUTION INTRAMUSCULAR; INTRAVENOUS at 10:11

## 2019-01-01 RX ADMIN — MAGNESIUM SULFATE IN WATER 2 G: 40 INJECTION, SOLUTION INTRAVENOUS at 11:26

## 2019-01-01 RX ADMIN — SENNOSIDES AND DOCUSATE SODIUM 1 TABLET: 8.6; 5 TABLET ORAL at 17:49

## 2019-01-01 RX ADMIN — CEFTRIAXONE SODIUM 2 G: 2 INJECTION, POWDER, FOR SOLUTION INTRAMUSCULAR; INTRAVENOUS at 16:30

## 2019-01-01 RX ADMIN — IOPAMIDOL 68 ML: 755 INJECTION, SOLUTION INTRAVENOUS at 09:19

## 2019-01-01 RX ADMIN — DEXAMETHASONE 4 MG: 4 TABLET ORAL at 14:12

## 2019-01-01 RX ADMIN — SODIUM CHLORIDE, POTASSIUM CHLORIDE, SODIUM LACTATE AND CALCIUM CHLORIDE 250 ML: 600; 310; 30; 20 INJECTION, SOLUTION INTRAVENOUS at 11:42

## 2019-01-01 RX ADMIN — SODIUM CHLORIDE 500 ML: 9 INJECTION, SOLUTION INTRAVENOUS at 09:38

## 2019-01-01 RX ADMIN — Medication 50 MG: at 13:43

## 2019-01-01 RX ADMIN — PHENYLEPHRINE HYDROCHLORIDE 100 MCG: 10 INJECTION INTRAVENOUS at 13:49

## 2019-01-01 RX ADMIN — CLOTRIMAZOLE 1 TROCHE: 10 LOZENGE ORAL at 22:13

## 2019-01-01 RX ADMIN — DRONABINOL 2.5 MG: 2.5 CAPSULE ORAL at 17:11

## 2019-01-01 RX ADMIN — LEVOTHYROXINE SODIUM 50 MCG: 50 TABLET ORAL at 10:11

## 2019-01-01 RX ADMIN — LEVOTHYROXINE SODIUM 50 MCG: 50 TABLET ORAL at 08:07

## 2019-01-01 RX ADMIN — ACETAMINOPHEN 975 MG: 325 TABLET ORAL at 08:59

## 2019-01-01 RX ADMIN — Medication 2 G: at 08:40

## 2019-01-01 RX ADMIN — OXYCODONE HYDROCHLORIDE 10 MG: 5 TABLET ORAL at 16:29

## 2019-01-01 RX ADMIN — PHENYLEPHRINE HYDROCHLORIDE 100 MCG: 10 INJECTION INTRAVENOUS at 14:10

## 2019-01-01 RX ADMIN — FENTANYL CITRATE 25 MCG: 50 INJECTION, SOLUTION INTRAMUSCULAR; INTRAVENOUS at 12:01

## 2019-01-01 RX ADMIN — DEXAMETHASONE SODIUM PHOSPHATE 12 MG: 10 INJECTION, SOLUTION INTRAMUSCULAR; INTRAVENOUS at 10:40

## 2019-01-01 RX ADMIN — PHENYLEPHRINE HYDROCHLORIDE 200 MCG: 10 INJECTION INTRAVENOUS at 08:43

## 2019-01-01 RX ADMIN — DIPHENHYDRAMINE HYDROCHLORIDE 25 MG: 50 INJECTION INTRAMUSCULAR; INTRAVENOUS at 12:11

## 2019-01-01 RX ADMIN — CEFTRIAXONE SODIUM 2 G: 2 INJECTION, POWDER, FOR SOLUTION INTRAMUSCULAR; INTRAVENOUS at 10:12

## 2019-01-01 RX ADMIN — PHENYLEPHRINE HYDROCHLORIDE 100 MCG: 10 INJECTION INTRAVENOUS at 08:14

## 2019-01-01 RX ADMIN — NOREPINEPHRINE BITARTRATE 6.4 MCG: 1 INJECTION INTRAVENOUS at 09:39

## 2019-01-01 RX ADMIN — TAZOBACTAM SODIUM AND PIPERACILLIN SODIUM 4.5 G: 500; 4 INJECTION, SOLUTION INTRAVENOUS at 22:12

## 2019-01-01 RX ADMIN — MAGNESIUM SULFATE HEPTAHYDRATE 4 G: 40 INJECTION, SOLUTION INTRAVENOUS at 09:16

## 2019-01-01 RX ADMIN — Medication 1 TABLET: at 07:59

## 2019-01-01 RX ADMIN — SODIUM CHLORIDE, PRESERVATIVE FREE 500 UNITS: 5 INJECTION INTRAVENOUS at 13:11

## 2019-01-01 RX ADMIN — LIDOCAINE HYDROCHLORIDE 10 ML: 10 INJECTION, SOLUTION EPIDURAL; INFILTRATION; INTRACAUDAL; PERINEURAL at 10:54

## 2019-01-01 RX ADMIN — CLOTRIMAZOLE 1 TROCHE: 10 LOZENGE ORAL at 11:40

## 2019-01-01 RX ADMIN — LEVOTHYROXINE SODIUM 50 MCG: 50 TABLET ORAL at 08:18

## 2019-01-01 RX ADMIN — OXYCODONE HYDROCHLORIDE 5 MG: 5 TABLET ORAL at 22:17

## 2019-01-01 RX ADMIN — LIDOCAINE HYDROCHLORIDE 10 ML: 10 INJECTION, SOLUTION EPIDURAL; INFILTRATION; INTRACAUDAL; PERINEURAL at 14:27

## 2019-01-01 RX ADMIN — PHENYLEPHRINE HYDROCHLORIDE 100 MCG: 10 INJECTION INTRAVENOUS at 14:16

## 2019-01-01 RX ADMIN — PROPOFOL 20 MG: 10 INJECTION, EMULSION INTRAVENOUS at 14:17

## 2019-01-01 RX ADMIN — POTASSIUM CHLORIDE 40 MEQ: 1.5 POWDER, FOR SOLUTION ORAL at 19:10

## 2019-01-01 RX ADMIN — FERROUS SULFATE TAB 325 MG (65 MG ELEMENTAL FE) 325 MG: 325 (65 FE) TAB at 09:11

## 2019-01-01 RX ADMIN — Medication 500 MG: at 08:35

## 2019-01-01 RX ADMIN — DIPHENHYDRAMINE HYDROCHLORIDE 25 MG: 25 CAPSULE ORAL at 10:49

## 2019-01-01 RX ADMIN — MORPHINE SULFATE 15 MG: 15 TABLET, EXTENDED RELEASE ORAL at 08:04

## 2019-01-01 RX ADMIN — GUAIFENESIN 600 MG: 600 TABLET ORAL at 12:31

## 2019-01-01 RX ADMIN — CLOTRIMAZOLE 1 TROCHE: 10 LOZENGE ORAL at 08:51

## 2019-01-01 RX ADMIN — ALBUMIN HUMAN: 0.05 INJECTION, SOLUTION INTRAVENOUS at 14:10

## 2019-01-01 RX ADMIN — MAGNESIUM SULFATE IN WATER 2 G: 40 INJECTION, SOLUTION INTRAVENOUS at 11:59

## 2019-01-01 RX ADMIN — SODIUM CHLORIDE 250 ML: 9 INJECTION, SOLUTION INTRAVENOUS at 10:48

## 2019-01-01 RX ADMIN — SODIUM CHLORIDE 250 ML: 9 INJECTION, SOLUTION INTRAVENOUS at 10:51

## 2019-01-01 RX ADMIN — IOPAMIDOL 77 ML: 755 INJECTION, SOLUTION INTRAVENOUS at 08:43

## 2019-01-01 RX ADMIN — CLOTRIMAZOLE 1 TROCHE: 10 LOZENGE ORAL at 15:08

## 2019-01-01 RX ADMIN — DEXAMETHASONE SODIUM PHOSPHATE 12 MG: 10 INJECTION, SOLUTION INTRAMUSCULAR; INTRAVENOUS at 10:42

## 2019-01-01 RX ADMIN — DEXAMETHASONE SODIUM PHOSPHATE 4 MG: 10 INJECTION, SOLUTION INTRAMUSCULAR; INTRAVENOUS at 01:34

## 2019-01-01 RX ADMIN — DEXAMETHASONE SODIUM PHOSPHATE 12 MG: 10 INJECTION, SOLUTION INTRAMUSCULAR; INTRAVENOUS at 10:50

## 2019-01-01 RX ADMIN — MAGNESIUM SULFATE IN WATER 4 G: 40 INJECTION, SOLUTION INTRAVENOUS at 11:35

## 2019-01-01 RX ADMIN — NOREPINEPHRINE BITARTRATE 6.4 MCG: 1 INJECTION INTRAVENOUS at 08:57

## 2019-01-01 RX ADMIN — CLOTRIMAZOLE 1 TROCHE: 10 LOZENGE ORAL at 22:15

## 2019-01-01 RX ADMIN — OXYCODONE HYDROCHLORIDE 10 MG: 5 TABLET ORAL at 10:39

## 2019-01-01 RX ADMIN — IPRATROPIUM BROMIDE AND ALBUTEROL SULFATE 3 ML: .5; 3 SOLUTION RESPIRATORY (INHALATION) at 21:14

## 2019-01-01 RX ADMIN — CALCIUM CARBONATE (ANTACID) CHEW TAB 500 MG 500 MG: 500 CHEW TAB at 04:03

## 2019-01-01 RX ADMIN — NOREPINEPHRINE BITARTRATE 6.4 MCG: 1 INJECTION INTRAVENOUS at 09:20

## 2019-01-01 RX ADMIN — MAGNESIUM SULFATE HEPTAHYDRATE 4 G: 40 INJECTION, SOLUTION INTRAVENOUS at 04:47

## 2019-01-01 RX ADMIN — PROPOFOL 25 MCG/KG/MIN: 10 INJECTION, EMULSION INTRAVENOUS at 11:36

## 2019-01-01 RX ADMIN — DRONABINOL 2.5 MG: 2.5 CAPSULE ORAL at 05:59

## 2019-01-01 RX ADMIN — PHENYLEPHRINE HYDROCHLORIDE 100 MCG: 10 INJECTION INTRAVENOUS at 14:03

## 2019-01-01 RX ADMIN — MAGNESIUM SULFATE IN WATER 2 G: 40 INJECTION, SOLUTION INTRAVENOUS at 10:53

## 2019-01-01 RX ADMIN — SENNOSIDES AND DOCUSATE SODIUM 2 TABLET: 8.6; 5 TABLET ORAL at 07:58

## 2019-01-01 RX ADMIN — MORPHINE SULFATE 15 MG: 15 TABLET, EXTENDED RELEASE ORAL at 19:54

## 2019-01-01 RX ADMIN — DEXAMETHASONE SODIUM PHOSPHATE 12 MG: 10 INJECTION, SOLUTION INTRAMUSCULAR; INTRAVENOUS at 13:32

## 2019-01-01 RX ADMIN — GEMCITABINE 1200 MG: 38 INJECTION, SOLUTION INTRAVENOUS at 14:06

## 2019-01-01 RX ADMIN — PACLITAXEL 138 MG: 6 INJECTION, SOLUTION INTRAVENOUS at 11:40

## 2019-01-01 RX ADMIN — ONDANSETRON 4 MG: 2 INJECTION INTRAMUSCULAR; INTRAVENOUS at 16:18

## 2019-01-01 RX ADMIN — POTASSIUM CHLORIDE 40 MEQ: 1500 TABLET, EXTENDED RELEASE ORAL at 11:45

## 2019-01-01 RX ADMIN — MAGNESIUM SULFATE HEPTAHYDRATE 2 G: 500 INJECTION, SOLUTION INTRAMUSCULAR; INTRAVENOUS at 11:00

## 2019-01-01 RX ADMIN — OMEPRAZOLE 20 MG: 20 CAPSULE, DELAYED RELEASE ORAL at 08:51

## 2019-01-01 RX ADMIN — DRONABINOL 2.5 MG: 2.5 CAPSULE ORAL at 08:04

## 2019-01-01 RX ADMIN — Medication 5 ML: at 11:22

## 2019-01-01 RX ADMIN — FENTANYL CITRATE 25 MCG: 50 INJECTION, SOLUTION INTRAMUSCULAR; INTRAVENOUS at 11:45

## 2019-01-01 RX ADMIN — MAGNESIUM SULFATE HEPTAHYDRATE: 500 INJECTION, SOLUTION INTRAMUSCULAR; INTRAVENOUS at 14:48

## 2019-01-01 RX ADMIN — SODIUM CHLORIDE 1000 ML: 9 INJECTION, SOLUTION INTRAVENOUS at 00:04

## 2019-01-01 RX ADMIN — Medication 5 ML: at 12:00

## 2019-01-01 RX ADMIN — DRONABINOL 2.5 MG: 2.5 CAPSULE ORAL at 17:37

## 2019-01-01 RX ADMIN — SODIUM CHLORIDE 1000 ML: 900 INJECTION, SOLUTION INTRAVENOUS at 18:00

## 2019-01-01 RX ADMIN — ENOXAPARIN SODIUM 40 MG: 40 INJECTION SUBCUTANEOUS at 21:27

## 2019-01-01 RX ADMIN — ALBUMIN HUMAN 250 ML: 0.05 INJECTION, SOLUTION INTRAVENOUS at 08:13

## 2019-01-01 RX ADMIN — LIDOCAINE HYDROCHLORIDE 10 ML: 10 INJECTION, SOLUTION EPIDURAL; INFILTRATION; INTRACAUDAL; PERINEURAL at 13:22

## 2019-01-01 RX ADMIN — Medication 5 ML: at 10:31

## 2019-01-01 RX ADMIN — DIPHENHYDRAMINE HYDROCHLORIDE 25 MG: 50 INJECTION, SOLUTION INTRAMUSCULAR; INTRAVENOUS at 11:03

## 2019-01-01 RX ADMIN — PACLITAXEL 138 MG: 6 INJECTION, SOLUTION INTRAVENOUS at 11:31

## 2019-01-01 RX ADMIN — OXYCODONE HYDROCHLORIDE 5 MG: 5 TABLET ORAL at 13:59

## 2019-01-01 RX ADMIN — AMOXICILLIN AND CLAVULANATE POTASSIUM 1 TABLET: 875; 125 TABLET, FILM COATED ORAL at 21:27

## 2019-01-01 RX ADMIN — PACLITAXEL 138 MG: 6 INJECTION, SOLUTION INTRAVENOUS at 11:00

## 2019-01-01 RX ADMIN — ROCURONIUM BROMIDE 10 MG: 10 INJECTION INTRAVENOUS at 14:58

## 2019-01-01 RX ADMIN — PROPOFOL 80 MG: 10 INJECTION, EMULSION INTRAVENOUS at 13:43

## 2019-01-01 RX ADMIN — FERROUS SULFATE TAB 325 MG (65 MG ELEMENTAL FE) 324 MG: 325 (65 FE) TAB at 19:32

## 2019-01-01 RX ADMIN — SODIUM PHOSPHATE, MONOBASIC, MONOHYDRATE AND SODIUM PHOSPHATE, DIBASIC, ANHYDROUS 20 MMOL: 276; 142 INJECTION, SOLUTION INTRAVENOUS at 10:14

## 2019-01-01 RX ADMIN — TAZOBACTAM SODIUM AND PIPERACILLIN SODIUM 4.5 G: 500; 4 INJECTION, SOLUTION INTRAVENOUS at 03:56

## 2019-01-01 RX ADMIN — FAMOTIDINE 40 MG: 10 INJECTION INTRAVENOUS at 10:36

## 2019-01-01 RX ADMIN — MORPHINE SULFATE 15 MG: 15 TABLET, EXTENDED RELEASE ORAL at 08:34

## 2019-01-01 RX ADMIN — LEVOTHYROXINE SODIUM 50 MCG: 50 TABLET ORAL at 08:04

## 2019-01-01 RX ADMIN — Medication 5 ML: at 12:18

## 2019-01-01 RX ADMIN — SODIUM CHLORIDE 1000 ML: 9 INJECTION, SOLUTION INTRAVENOUS at 09:12

## 2019-01-01 RX ADMIN — MAGNESIUM SULFATE HEPTAHYDRATE 40 MEQ: 500 INJECTION, SOLUTION INTRAMUSCULAR; INTRAVENOUS at 09:12

## 2019-01-01 RX ADMIN — DRONABINOL 2.5 MG: 2.5 CAPSULE ORAL at 08:29

## 2019-01-01 RX ADMIN — DEXAMETHASONE SODIUM PHOSPHATE 4 MG: 4 INJECTION, SOLUTION INTRAMUSCULAR; INTRAVENOUS at 23:09

## 2019-01-01 RX ADMIN — DEXAMETHASONE SODIUM PHOSPHATE 10 MG: 4 INJECTION, SOLUTION INTRA-ARTICULAR; INTRALESIONAL; INTRAMUSCULAR; INTRAVENOUS; SOFT TISSUE at 07:52

## 2019-01-01 RX ADMIN — DRONABINOL 2.5 MG: 2.5 CAPSULE ORAL at 16:37

## 2019-01-01 RX ADMIN — SODIUM CHLORIDE 1000 ML: 9 INJECTION, SOLUTION INTRAVENOUS at 11:35

## 2019-01-01 RX ADMIN — CLOTRIMAZOLE 1 TROCHE: 10 LOZENGE ORAL at 10:11

## 2019-01-01 RX ADMIN — DRONABINOL 2.5 MG: 2.5 CAPSULE ORAL at 08:41

## 2019-01-01 RX ADMIN — ALBUTEROL SULFATE 2.5 MG: 2.5 SOLUTION RESPIRATORY (INHALATION) at 06:50

## 2019-01-01 RX ADMIN — Medication 5 ML: at 13:49

## 2019-01-01 RX ADMIN — Medication 500 MG: at 14:02

## 2019-01-01 RX ADMIN — PHENYLEPHRINE HYDROCHLORIDE 200 MCG: 10 INJECTION INTRAVENOUS at 08:34

## 2019-01-01 RX ADMIN — Medication 5 ML: at 13:25

## 2019-01-01 RX ADMIN — OXYCODONE HYDROCHLORIDE 10 MG: 5 TABLET ORAL at 11:49

## 2019-01-01 RX ADMIN — MORPHINE SULFATE 15 MG: 15 TABLET, EXTENDED RELEASE ORAL at 20:06

## 2019-01-01 RX ADMIN — MAGNESIUM SULFATE HEPTAHYDRATE 40 MEQ: 500 INJECTION, SOLUTION INTRAMUSCULAR; INTRAVENOUS at 11:20

## 2019-01-01 RX ADMIN — POTASSIUM CHLORIDE 40 MEQ: 1500 TABLET, EXTENDED RELEASE ORAL at 11:30

## 2019-01-01 RX ADMIN — DEXAMETHASONE SODIUM PHOSPHATE 4 MG: 4 INJECTION, SOLUTION INTRAMUSCULAR; INTRAVENOUS at 17:01

## 2019-01-01 RX ADMIN — SODIUM CHLORIDE: 9 INJECTION, SOLUTION INTRAVENOUS at 15:00

## 2019-01-01 RX ADMIN — Medication 400 MG: at 22:12

## 2019-01-01 RX ADMIN — OXYCODONE HYDROCHLORIDE 10 MG: 5 TABLET ORAL at 00:54

## 2019-01-01 RX ADMIN — DRONABINOL 2.5 MG: 2.5 CAPSULE ORAL at 17:02

## 2019-01-01 RX ADMIN — FENTANYL CITRATE 50 MCG: 50 INJECTION INTRAMUSCULAR; INTRAVENOUS at 11:13

## 2019-01-01 RX ADMIN — DEXAMETHASONE SODIUM PHOSPHATE 12 MG: 10 INJECTION, SOLUTION INTRAMUSCULAR; INTRAVENOUS at 11:03

## 2019-01-01 RX ADMIN — DEXAMETHASONE SODIUM PHOSPHATE 12 MG: 10 INJECTION, SOLUTION INTRAMUSCULAR; INTRAVENOUS at 12:44

## 2019-01-01 RX ADMIN — IPRATROPIUM BROMIDE AND ALBUTEROL SULFATE 3 ML: .5; 3 SOLUTION RESPIRATORY (INHALATION) at 21:20

## 2019-01-01 RX ADMIN — DIPHENHYDRAMINE HYDROCHLORIDE 25 MG: 25 CAPSULE ORAL at 11:26

## 2019-01-01 RX ADMIN — CEFAZOLIN SODIUM 2 G: 2 INJECTION, SOLUTION INTRAVENOUS at 11:38

## 2019-01-01 RX ADMIN — GEMCITABINE 1400 MG: 38 INJECTION, SOLUTION INTRAVENOUS at 14:30

## 2019-01-01 RX ADMIN — LIDOCAINE HYDROCHLORIDE 10 ML: 10 INJECTION, SOLUTION EPIDURAL; INFILTRATION; INTRACAUDAL; PERINEURAL at 14:13

## 2019-01-01 RX ADMIN — PHENYLEPHRINE HYDROCHLORIDE 100 MCG: 10 INJECTION INTRAVENOUS at 14:19

## 2019-01-01 RX ADMIN — Medication 10 ML: at 17:15

## 2019-01-01 RX ADMIN — SODIUM CHLORIDE 1000 ML: 9 INJECTION, SOLUTION INTRAVENOUS at 05:08

## 2019-01-01 RX ADMIN — PACLITAXEL 138 MG: 6 INJECTION, SOLUTION INTRAVENOUS at 11:12

## 2019-01-01 RX ADMIN — CALCIUM CARBONATE (ANTACID) CHEW TAB 500 MG 500 MG: 500 CHEW TAB at 00:01

## 2019-01-01 RX ADMIN — MAGNESIUM SULFATE HEPTAHYDRATE 2 G: 500 INJECTION, SOLUTION INTRAMUSCULAR; INTRAVENOUS at 11:54

## 2019-01-01 RX ADMIN — IPRATROPIUM BROMIDE AND ALBUTEROL SULFATE 3 ML: .5; 3 SOLUTION RESPIRATORY (INHALATION) at 21:10

## 2019-01-01 RX ADMIN — PANTOPRAZOLE SODIUM 40 MG: 40 INJECTION, POWDER, FOR SOLUTION INTRAVENOUS at 02:16

## 2019-01-01 RX ADMIN — Medication 5 ML: at 11:27

## 2019-01-01 RX ADMIN — LEVETIRACETAM 1000 MG: 10 INJECTION INTRAVENOUS at 08:35

## 2019-01-01 RX ADMIN — POLYETHYLENE GLYCOL 3350 17 G: 17 POWDER, FOR SOLUTION ORAL at 14:40

## 2019-01-01 RX ADMIN — OXYCODONE HYDROCHLORIDE 10 MG: 5 TABLET ORAL at 22:58

## 2019-01-01 RX ADMIN — MAGNESIUM OXIDE TAB 400 MG (241.3 MG ELEMENTAL MG) 400 MG: 400 (241.3 MG) TAB at 08:07

## 2019-01-01 RX ADMIN — SODIUM CHLORIDE 1000 ML: 9 INJECTION, SOLUTION INTRAVENOUS at 19:10

## 2019-01-01 RX ADMIN — DIPHENHYDRAMINE HYDROCHLORIDE 25 MG: 50 INJECTION, SOLUTION INTRAMUSCULAR; INTRAVENOUS at 12:27

## 2019-01-01 RX ADMIN — POLYETHYLENE GLYCOL 3350 17 G: 17 POWDER, FOR SOLUTION ORAL at 22:31

## 2019-01-01 RX ADMIN — MAGNESIUM SULFATE HEPTAHYDRATE 4 G: 40 INJECTION, SOLUTION INTRAVENOUS at 21:11

## 2019-01-01 RX ADMIN — LIDOCAINE HYDROCHLORIDE 10 ML: 10 INJECTION, SOLUTION EPIDURAL; INFILTRATION; INTRACAUDAL; PERINEURAL at 10:52

## 2019-01-01 RX ADMIN — PACLITAXEL 138 MG: 6 INJECTION, SOLUTION INTRAVENOUS at 11:01

## 2019-01-01 RX ADMIN — POTASSIUM CHLORIDE 20 MEQ: 750 TABLET, EXTENDED RELEASE ORAL at 10:44

## 2019-01-01 RX ADMIN — AMOXICILLIN AND CLAVULANATE POTASSIUM 1 TABLET: 875; 125 TABLET, FILM COATED ORAL at 08:40

## 2019-01-01 RX ADMIN — Medication 2 G: at 16:04

## 2019-01-01 RX ADMIN — FAMOTIDINE 40 MG: 10 INJECTION, SOLUTION INTRAVENOUS at 10:54

## 2019-01-01 RX ADMIN — MORPHINE SULFATE 15 MG: 15 TABLET, EXTENDED RELEASE ORAL at 08:18

## 2019-01-01 RX ADMIN — PANTOPRAZOLE SODIUM 40 MG: 40 INJECTION, POWDER, FOR SOLUTION INTRAVENOUS at 10:11

## 2019-01-01 RX ADMIN — SODIUM CHLORIDE, PRESERVATIVE FREE: 5 INJECTION INTRAVENOUS at 14:13

## 2019-01-01 RX ADMIN — PROCHLORPERAZINE EDISYLATE 10 MG: 5 INJECTION INTRAMUSCULAR; INTRAVENOUS at 16:42

## 2019-01-01 RX ADMIN — DRONABINOL 2.5 MG: 2.5 CAPSULE ORAL at 07:59

## 2019-01-01 RX ADMIN — MORPHINE SULFATE 15 MG: 15 TABLET, EXTENDED RELEASE ORAL at 08:40

## 2019-01-01 RX ADMIN — OXYCODONE HYDROCHLORIDE 10 MG: 5 TABLET ORAL at 07:59

## 2019-01-01 RX ADMIN — SODIUM CHLORIDE 1000 ML: 9 INJECTION, SOLUTION INTRAVENOUS at 15:12

## 2019-01-01 RX ADMIN — ONDANSETRON 4 MG: 4 TABLET, ORALLY DISINTEGRATING ORAL at 17:01

## 2019-01-01 RX ADMIN — DEXAMETHASONE 4 MG: 4 TABLET ORAL at 23:09

## 2019-01-01 RX ADMIN — PACLITAXEL 138 MG: 6 INJECTION, SOLUTION INTRAVENOUS at 11:34

## 2019-01-01 RX ADMIN — PANTOPRAZOLE SODIUM 40 MG: 40 TABLET, DELAYED RELEASE ORAL at 08:40

## 2019-01-01 RX ADMIN — SUGAMMADEX 130 MG: 100 INJECTION, SOLUTION INTRAVENOUS at 15:51

## 2019-01-01 RX ADMIN — DEXAMETHASONE SODIUM PHOSPHATE 4 MG: 10 INJECTION, SOLUTION INTRAMUSCULAR; INTRAVENOUS at 08:13

## 2019-01-01 RX ADMIN — LIDOCAINE HYDROCHLORIDE 10 ML: 10 INJECTION, SOLUTION EPIDURAL; INFILTRATION; INTRACAUDAL; PERINEURAL at 13:17

## 2019-01-01 RX ADMIN — MORPHINE SULFATE 15 MG: 15 TABLET, EXTENDED RELEASE ORAL at 08:41

## 2019-01-01 RX ADMIN — IPRATROPIUM BROMIDE AND ALBUTEROL SULFATE 3 ML: .5; 3 SOLUTION RESPIRATORY (INHALATION) at 15:23

## 2019-01-01 RX ADMIN — POTASSIUM CHLORIDE 40 MEQ: 1500 TABLET, EXTENDED RELEASE ORAL at 14:55

## 2019-01-01 RX ADMIN — ROCURONIUM BROMIDE 60 MG: 10 INJECTION INTRAVENOUS at 07:52

## 2019-01-01 RX ADMIN — SENNOSIDES AND DOCUSATE SODIUM 2 TABLET: 8.6; 5 TABLET ORAL at 19:32

## 2019-01-01 RX ADMIN — MAGNESIUM SULFATE HEPTAHYDRATE 2 G: 500 INJECTION, SOLUTION INTRAMUSCULAR; INTRAVENOUS at 10:25

## 2019-01-01 RX ADMIN — OXYCODONE HYDROCHLORIDE 10 MG: 5 TABLET ORAL at 16:03

## 2019-01-01 RX ADMIN — DEXAMETHASONE SODIUM PHOSPHATE 12 MG: 10 INJECTION, SOLUTION INTRAMUSCULAR; INTRAVENOUS at 12:41

## 2019-01-01 RX ADMIN — FAMOTIDINE 40 MG: 10 INJECTION, SOLUTION INTRAVENOUS at 10:51

## 2019-01-01 RX ADMIN — FENTANYL CITRATE 50 MCG: 50 INJECTION, SOLUTION INTRAMUSCULAR; INTRAVENOUS at 13:20

## 2019-01-01 RX ADMIN — ROCURONIUM BROMIDE 50 MG: 10 INJECTION INTRAVENOUS at 13:20

## 2019-01-01 RX ADMIN — MORPHINE SULFATE 15 MG: 15 TABLET, EXTENDED RELEASE ORAL at 19:13

## 2019-01-01 RX ADMIN — Medication 5 ML: at 20:20

## 2019-01-01 RX ADMIN — POLYETHYLENE GLYCOL 3350 17 G: 17 POWDER, FOR SOLUTION ORAL at 08:13

## 2019-01-01 RX ADMIN — PACLITAXEL 138 MG: 6 INJECTION, SOLUTION INTRAVENOUS at 11:46

## 2019-01-01 RX ADMIN — Medication 0.3 MCG/KG/MIN: at 08:30

## 2019-01-01 RX ADMIN — ROCURONIUM BROMIDE 10 MG: 10 INJECTION INTRAVENOUS at 14:14

## 2019-01-01 RX ADMIN — PANTOPRAZOLE SODIUM 40 MG: 40 TABLET, DELAYED RELEASE ORAL at 17:26

## 2019-01-01 RX ADMIN — OMEPRAZOLE 20 MG: 20 CAPSULE, DELAYED RELEASE ORAL at 08:14

## 2019-01-01 RX ADMIN — SODIUM CHLORIDE 1000 ML: 9 INJECTION, SOLUTION INTRAVENOUS at 20:29

## 2019-01-01 RX ADMIN — ONDANSETRON 4 MG: 2 INJECTION INTRAMUSCULAR; INTRAVENOUS at 14:47

## 2019-01-01 RX ADMIN — DEXAMETHASONE SODIUM PHOSPHATE 6 MG: 4 INJECTION, SOLUTION INTRA-ARTICULAR; INTRALESIONAL; INTRAMUSCULAR; INTRAVENOUS; SOFT TISSUE at 11:38

## 2019-01-01 RX ADMIN — MAGNESIUM SULFATE IN WATER 2 G: 40 INJECTION, SOLUTION INTRAVENOUS at 11:20

## 2019-01-01 RX ADMIN — MAGNESIUM SULFATE HEPTAHYDRATE 4 G: 40 INJECTION, SOLUTION INTRAVENOUS at 21:05

## 2019-01-01 RX ADMIN — SENNOSIDES AND DOCUSATE SODIUM 2 TABLET: 8.6; 5 TABLET ORAL at 19:56

## 2019-01-01 RX ADMIN — IPRATROPIUM BROMIDE AND ALBUTEROL SULFATE 3 ML: .5; 3 SOLUTION RESPIRATORY (INHALATION) at 21:06

## 2019-01-01 RX ADMIN — SODIUM CHLORIDE, PRESERVATIVE FREE 5 ML: 5 INJECTION INTRAVENOUS at 12:35

## 2019-01-01 RX ADMIN — PHENYLEPHRINE HYDROCHLORIDE 0.1 MCG/KG/MIN: 10 INJECTION INTRAVENOUS at 14:26

## 2019-01-01 RX ADMIN — PHENYLEPHRINE HYDROCHLORIDE 50 MCG: 10 INJECTION INTRAVENOUS at 13:46

## 2019-01-01 RX ADMIN — DEXAMETHASONE SODIUM PHOSPHATE 4 MG: 10 INJECTION, SOLUTION INTRAMUSCULAR; INTRAVENOUS at 14:53

## 2019-01-01 RX ADMIN — SODIUM CHLORIDE 250 ML: 9 INJECTION, SOLUTION INTRAVENOUS at 11:03

## 2019-01-01 RX ADMIN — ENOXAPARIN SODIUM 40 MG: 40 INJECTION SUBCUTANEOUS at 18:06

## 2019-01-01 RX ADMIN — PHENYLEPHRINE HYDROCHLORIDE 50 MCG: 10 INJECTION INTRAVENOUS at 13:45

## 2019-01-01 RX ADMIN — FENTANYL CITRATE 50 MCG: 50 INJECTION, SOLUTION INTRAMUSCULAR; INTRAVENOUS at 07:52

## 2019-01-01 RX ADMIN — PIPERACILLIN SODIUM AND TAZOBACTAM SODIUM 3.38 G: 3; .375 INJECTION, POWDER, LYOPHILIZED, FOR SOLUTION INTRAVENOUS at 00:55

## 2019-01-01 RX ADMIN — MAGNESIUM SULFATE HEPTAHYDRATE 2 G: 500 INJECTION, SOLUTION INTRAMUSCULAR; INTRAVENOUS at 12:36

## 2019-01-01 RX ADMIN — DRONABINOL 2.5 MG: 2.5 CAPSULE ORAL at 08:18

## 2019-01-01 RX ADMIN — SODIUM CHLORIDE 65 ML: 9 INJECTION, SOLUTION INTRAVENOUS at 13:31

## 2019-01-01 RX ADMIN — PIPERACILLIN SODIUM AND TAZOBACTAM SODIUM 3.38 G: 3; .375 INJECTION, POWDER, LYOPHILIZED, FOR SOLUTION INTRAVENOUS at 10:38

## 2019-01-01 RX ADMIN — MORPHINE SULFATE 15 MG: 15 TABLET, EXTENDED RELEASE ORAL at 20:17

## 2019-01-01 RX ADMIN — POTASSIUM CHLORIDE 20 MEQ: 29.8 INJECTION, SOLUTION INTRAVENOUS at 04:47

## 2019-01-01 RX ADMIN — LEVOTHYROXINE SODIUM 50 MCG: 50 TABLET ORAL at 08:51

## 2019-01-01 RX ADMIN — MAGNESIUM SULFATE IN WATER 2 G: 40 INJECTION, SOLUTION INTRAVENOUS at 11:48

## 2019-01-01 RX ADMIN — DEXAMETHASONE SODIUM PHOSPHATE 4 MG: 4 INJECTION, SOLUTION INTRA-ARTICULAR; INTRALESIONAL; INTRAMUSCULAR; INTRAVENOUS; SOFT TISSUE at 14:01

## 2019-01-01 RX ADMIN — FENTANYL CITRATE 25 MCG: 50 INJECTION, SOLUTION INTRAMUSCULAR; INTRAVENOUS at 09:40

## 2019-01-01 RX ADMIN — DRONABINOL 2.5 MG: 2.5 CAPSULE ORAL at 09:10

## 2019-01-01 RX ADMIN — SODIUM CHLORIDE, POTASSIUM CHLORIDE, SODIUM LACTATE AND CALCIUM CHLORIDE: 600; 310; 30; 20 INJECTION, SOLUTION INTRAVENOUS at 13:41

## 2019-01-01 RX ADMIN — CEFTRIAXONE 1 G: 1 INJECTION, POWDER, FOR SOLUTION INTRAMUSCULAR; INTRAVENOUS at 21:40

## 2019-01-01 RX ADMIN — PIPERACILLIN SODIUM AND TAZOBACTAM SODIUM 3.38 G: 3; .375 INJECTION, POWDER, LYOPHILIZED, FOR SOLUTION INTRAVENOUS at 18:59

## 2019-01-01 RX ADMIN — MAGNESIUM SULFATE IN WATER 2 G: 40 INJECTION, SOLUTION INTRAVENOUS at 11:30

## 2019-01-01 RX ADMIN — LIDOCAINE HYDROCHLORIDE 10 ML: 10 INJECTION, SOLUTION EPIDURAL; INFILTRATION; INTRACAUDAL; PERINEURAL at 11:07

## 2019-01-01 RX ADMIN — OXYCODONE HYDROCHLORIDE 5 MG: 5 TABLET ORAL at 14:33

## 2019-01-01 RX ADMIN — MORPHINE SULFATE 15 MG: 15 TABLET, EXTENDED RELEASE ORAL at 22:02

## 2019-01-01 RX ADMIN — ALTEPLASE 2 MG: 2.2 INJECTION, POWDER, LYOPHILIZED, FOR SOLUTION INTRAVENOUS at 15:59

## 2019-01-01 RX ADMIN — PROPOFOL 150 MCG/KG/MIN: 10 INJECTION, EMULSION INTRAVENOUS at 13:36

## 2019-01-01 RX ADMIN — Medication 500 MG: at 11:09

## 2019-01-01 RX ADMIN — LEVOTHYROXINE SODIUM 50 MCG: 50 TABLET ORAL at 08:41

## 2019-01-01 RX ADMIN — FERROUS SULFATE TAB 325 MG (65 MG ELEMENTAL FE) 325 MG: 325 (65 FE) TAB at 07:59

## 2019-01-01 RX ADMIN — DRONABINOL 2.5 MG: 2.5 CAPSULE ORAL at 16:36

## 2019-01-01 RX ADMIN — POLYETHYLENE GLYCOL 3350 17 G: 17 POWDER, FOR SOLUTION ORAL at 07:58

## 2019-01-01 RX ADMIN — CEFTRIAXONE SODIUM 2 G: 2 INJECTION, POWDER, FOR SOLUTION INTRAMUSCULAR; INTRAVENOUS at 19:10

## 2019-01-01 RX ADMIN — SODIUM CHLORIDE, POTASSIUM CHLORIDE, SODIUM LACTATE AND CALCIUM CHLORIDE: 600; 310; 30; 20 INJECTION, SOLUTION INTRAVENOUS at 11:31

## 2019-01-01 RX ADMIN — ROCURONIUM BROMIDE 10 MG: 10 INJECTION INTRAVENOUS at 14:48

## 2019-01-01 RX ADMIN — PACLITAXEL 138 MG: 6 INJECTION, SOLUTION INTRAVENOUS at 12:01

## 2019-01-01 RX ADMIN — OXYCODONE HYDROCHLORIDE 5 MG: 5 TABLET ORAL at 18:32

## 2019-01-01 RX ADMIN — DEXAMETHASONE 4 MG: 4 TABLET ORAL at 05:33

## 2019-01-01 RX ADMIN — PHENYLEPHRINE HYDROCHLORIDE 200 MCG: 10 INJECTION INTRAVENOUS at 08:17

## 2019-01-01 RX ADMIN — Medication 1 UNITS: at 08:37

## 2019-01-01 RX ADMIN — Medication 1 TABLET: at 14:33

## 2019-01-01 RX ADMIN — ONDANSETRON 8 MG: 2 INJECTION INTRAMUSCULAR; INTRAVENOUS at 10:50

## 2019-01-01 RX ADMIN — DEXAMETHASONE 4 MG: 4 TABLET ORAL at 06:16

## 2019-01-01 RX ADMIN — DIPHENHYDRAMINE HYDROCHLORIDE 25 MG: 50 INJECTION INTRAMUSCULAR; INTRAVENOUS at 11:17

## 2019-01-01 RX ADMIN — ACETAMINOPHEN 975 MG: 325 TABLET, FILM COATED ORAL at 06:50

## 2019-01-01 RX ADMIN — PIPERACILLIN SODIUM AND TAZOBACTAM SODIUM 3.38 G: 3; .375 INJECTION, POWDER, LYOPHILIZED, FOR SOLUTION INTRAVENOUS at 00:37

## 2019-01-01 RX ADMIN — FERROUS SULFATE TAB 325 MG (65 MG ELEMENTAL FE) 325 MG: 325 (65 FE) TAB at 11:11

## 2019-01-01 RX ADMIN — ROCURONIUM BROMIDE 20 MG: 10 INJECTION INTRAVENOUS at 08:17

## 2019-01-01 RX ADMIN — CLOTRIMAZOLE 1 TROCHE: 10 LOZENGE ORAL at 19:00

## 2019-01-01 RX ADMIN — TAZOBACTAM SODIUM AND PIPERACILLIN SODIUM 4.5 G: 500; 4 INJECTION, SOLUTION INTRAVENOUS at 06:00

## 2019-01-01 RX ADMIN — FAMOTIDINE 40 MG: 10 INJECTION, SOLUTION INTRAVENOUS at 11:14

## 2019-01-01 RX ADMIN — OXYCODONE HYDROCHLORIDE 5 MG: 5 TABLET ORAL at 00:29

## 2019-01-01 RX ADMIN — DEXAMETHASONE SODIUM PHOSPHATE 4 MG: 10 INJECTION, SOLUTION INTRAMUSCULAR; INTRAVENOUS at 08:29

## 2019-01-01 RX ADMIN — PROPOFOL: 10 INJECTION, EMULSION INTRAVENOUS at 15:09

## 2019-01-01 RX ADMIN — SODIUM CHLORIDE 73 ML: 9 INJECTION, SOLUTION INTRAVENOUS at 16:41

## 2019-01-01 RX ADMIN — ONDANSETRON 4 MG: 2 INJECTION INTRAMUSCULAR; INTRAVENOUS at 13:30

## 2019-01-01 RX ADMIN — SODIUM CHLORIDE 1000 ML: 9 INJECTION, SOLUTION INTRAVENOUS at 09:45

## 2019-01-01 RX ADMIN — NOREPINEPHRINE BITARTRATE 6.4 MCG: 1 INJECTION INTRAVENOUS at 09:12

## 2019-01-01 RX ADMIN — DEXAMETHASONE SODIUM PHOSPHATE 4 MG: 10 INJECTION, SOLUTION INTRAMUSCULAR; INTRAVENOUS at 21:57

## 2019-01-01 RX ADMIN — CLOTRIMAZOLE 1 TROCHE: 10 LOZENGE ORAL at 21:57

## 2019-01-01 RX ADMIN — MAGNESIUM SULFATE HEPTAHYDRATE 2 G: 500 INJECTION, SOLUTION INTRAMUSCULAR; INTRAVENOUS at 11:38

## 2019-01-01 RX ADMIN — Medication 500 UNITS: at 10:46

## 2019-01-01 RX ADMIN — LIDOCAINE HYDROCHLORIDE 80 MG: 20 INJECTION, SOLUTION INFILTRATION; PERINEURAL at 07:52

## 2019-01-01 RX ADMIN — DEXAMETHASONE 4 MG: 4 TABLET ORAL at 06:05

## 2019-01-01 RX ADMIN — NOREPINEPHRINE BITARTRATE 6.4 MCG: 1 INJECTION INTRAVENOUS at 09:15

## 2019-01-01 RX ADMIN — DEXAMETHASONE 3 MG: 1 TABLET ORAL at 14:13

## 2019-01-01 RX ADMIN — DRONABINOL 2.5 MG: 2.5 CAPSULE ORAL at 11:08

## 2019-01-01 RX ADMIN — IPRATROPIUM BROMIDE AND ALBUTEROL SULFATE 3 ML: .5; 3 SOLUTION RESPIRATORY (INHALATION) at 07:33

## 2019-01-01 RX ADMIN — FENTANYL CITRATE 50 MCG: 50 INJECTION, SOLUTION INTRAMUSCULAR; INTRAVENOUS at 13:15

## 2019-01-01 RX ADMIN — GEMCITABINE 1400 MG: 38 INJECTION, SOLUTION INTRAVENOUS at 13:02

## 2019-01-01 RX ADMIN — DRONABINOL 2.5 MG: 2.5 CAPSULE ORAL at 17:26

## 2019-01-01 RX ADMIN — FAMOTIDINE 40 MG: 10 INJECTION INTRAVENOUS at 10:33

## 2019-01-01 RX ADMIN — PROPOFOL 20 MG: 10 INJECTION, EMULSION INTRAVENOUS at 14:01

## 2019-01-01 RX ADMIN — PACLITAXEL 138 MG: 6 INJECTION, SOLUTION INTRAVENOUS at 11:05

## 2019-01-01 RX ADMIN — MAGNESIUM SULFATE HEPTAHYDRATE 4 G: 40 INJECTION, SOLUTION INTRAVENOUS at 12:50

## 2019-01-01 RX ADMIN — CEFAZOLIN SODIUM 2 G: 2 INJECTION, SOLUTION INTRAVENOUS at 08:25

## 2019-01-01 RX ADMIN — OXYCODONE HYDROCHLORIDE 10 MG: 5 TABLET ORAL at 04:42

## 2019-01-01 RX ADMIN — PACLITAXEL 138 MG: 6 INJECTION, SOLUTION INTRAVENOUS at 13:02

## 2019-01-01 RX ADMIN — MORPHINE SULFATE 15 MG: 15 TABLET, EXTENDED RELEASE ORAL at 22:12

## 2019-01-01 RX ADMIN — SODIUM CHLORIDE, PRESERVATIVE FREE 5 ML: 5 INJECTION INTRAVENOUS at 14:00

## 2019-01-01 RX ADMIN — MANNITOL 50 G: 20 INJECTION, SOLUTION INTRAVENOUS at 08:45

## 2019-01-01 RX ADMIN — MAGNESIUM SULFATE HEPTAHYDRATE 4 G: 40 INJECTION, SOLUTION INTRAVENOUS at 23:30

## 2019-01-01 RX ADMIN — HYDROMORPHONE HYDROCHLORIDE 0.3 MG: 1 INJECTION, SOLUTION INTRAMUSCULAR; INTRAVENOUS; SUBCUTANEOUS at 19:29

## 2019-01-01 RX ADMIN — ALBUTEROL SULFATE 2 PUFF: 90 AEROSOL, METERED RESPIRATORY (INHALATION) at 17:02

## 2019-01-01 RX ADMIN — PIPERACILLIN SODIUM AND TAZOBACTAM SODIUM 3.38 G: 3; .375 INJECTION, POWDER, LYOPHILIZED, FOR SOLUTION INTRAVENOUS at 04:03

## 2019-01-01 RX ADMIN — FAMOTIDINE 40 MG: 10 INJECTION INTRAVENOUS at 11:26

## 2019-01-01 RX ADMIN — POLYETHYLENE GLYCOL 3350 17 G: 17 POWDER, FOR SOLUTION ORAL at 13:28

## 2019-01-01 RX ADMIN — Medication 500 MG: at 09:10

## 2019-01-01 RX ADMIN — Medication 5 ML: at 15:20

## 2019-01-01 RX ADMIN — LIDOCAINE HYDROCHLORIDE 5 ML: 10 INJECTION, SOLUTION EPIDURAL; INFILTRATION; INTRACAUDAL; PERINEURAL at 09:44

## 2019-01-01 RX ADMIN — PIPERACILLIN SODIUM AND TAZOBACTAM SODIUM 3.38 G: 3; .375 INJECTION, POWDER, LYOPHILIZED, FOR SOLUTION INTRAVENOUS at 08:28

## 2019-01-01 RX ADMIN — PANTOPRAZOLE SODIUM 40 MG: 40 TABLET, DELAYED RELEASE ORAL at 07:59

## 2019-01-01 RX ADMIN — LIDOCAINE HYDROCHLORIDE 18 ML: 10 INJECTION, SOLUTION EPIDURAL; INFILTRATION; INTRACAUDAL; PERINEURAL at 10:18

## 2019-01-01 RX ADMIN — SODIUM CHLORIDE, PRESERVATIVE FREE 5 ML: 5 INJECTION INTRAVENOUS at 13:55

## 2019-01-01 RX ADMIN — TAMSULOSIN HYDROCHLORIDE 0.4 MG: 0.4 CAPSULE ORAL at 08:18

## 2019-01-01 RX ADMIN — FERROUS SULFATE TAB 325 MG (65 MG ELEMENTAL FE) 325 MG: 325 (65 FE) TAB at 12:09

## 2019-01-01 RX ADMIN — Medication 500 MG: at 22:11

## 2019-01-01 RX ADMIN — PANTOPRAZOLE SODIUM 40 MG: 40 TABLET, DELAYED RELEASE ORAL at 09:10

## 2019-01-01 RX ADMIN — LIDOCAINE HYDROCHLORIDE 10 ML: 10 INJECTION, SOLUTION EPIDURAL; INFILTRATION; INTRACAUDAL; PERINEURAL at 12:59

## 2019-01-01 RX ADMIN — PACLITAXEL 138 MG: 6 INJECTION, SOLUTION INTRAVENOUS at 11:44

## 2019-01-01 RX ADMIN — ACETAMINOPHEN 650 MG: 325 TABLET, FILM COATED ORAL at 13:28

## 2019-01-01 RX ADMIN — PROPOFOL 20 MG: 10 INJECTION, EMULSION INTRAVENOUS at 13:55

## 2019-01-01 RX ADMIN — MORPHINE SULFATE 15 MG: 15 TABLET, EXTENDED RELEASE ORAL at 08:23

## 2019-01-01 RX ADMIN — SODIUM CHLORIDE 250 ML: 9 INJECTION, SOLUTION INTRAVENOUS at 10:47

## 2019-01-01 RX ADMIN — PHENYLEPHRINE HYDROCHLORIDE 100 MCG: 10 INJECTION INTRAVENOUS at 11:47

## 2019-01-01 RX ADMIN — PANTOPRAZOLE SODIUM 40 MG: 40 TABLET, DELAYED RELEASE ORAL at 00:01

## 2019-01-01 RX ADMIN — MORPHINE SULFATE 15 MG: 15 TABLET, EXTENDED RELEASE ORAL at 21:09

## 2019-01-01 RX ADMIN — HEPARIN, PORCINE (PF) 10 UNIT/ML INTRAVENOUS SYRINGE 5 ML: at 15:54

## 2019-01-01 RX ADMIN — Medication 500 UNITS: at 11:43

## 2019-01-01 RX ADMIN — TOPICAL ANESTHETIC 1 SPRAY: 200 SPRAY DENTAL; PERIODONTAL at 13:44

## 2019-01-01 RX ADMIN — POTASSIUM CHLORIDE AND SODIUM CHLORIDE: 900; 150 INJECTION, SOLUTION INTRAVENOUS at 06:32

## 2019-01-01 RX ADMIN — AMOXICILLIN AND CLAVULANATE POTASSIUM 1 TABLET: 875; 125 TABLET, FILM COATED ORAL at 08:07

## 2019-01-01 RX ADMIN — PIPERACILLIN SODIUM AND TAZOBACTAM SODIUM 3.38 G: 3; .375 INJECTION, POWDER, LYOPHILIZED, FOR SOLUTION INTRAVENOUS at 08:14

## 2019-01-01 RX ADMIN — LEVOTHYROXINE SODIUM 50 MCG: 50 TABLET ORAL at 08:13

## 2019-01-01 RX ADMIN — CEFTRIAXONE SODIUM 2 G: 2 INJECTION, POWDER, FOR SOLUTION INTRAMUSCULAR; INTRAVENOUS at 19:34

## 2019-01-01 RX ADMIN — DEXAMETHASONE SODIUM PHOSPHATE 12 MG: 10 INJECTION, SOLUTION INTRAMUSCULAR; INTRAVENOUS at 10:49

## 2019-01-01 RX ADMIN — GUAIFENESIN 600 MG: 600 TABLET ORAL at 10:13

## 2019-01-01 RX ADMIN — TAMSULOSIN HYDROCHLORIDE 0.4 MG: 0.4 CAPSULE ORAL at 08:40

## 2019-01-01 RX ADMIN — OXYCODONE HYDROCHLORIDE 5 MG: 5 TABLET ORAL at 15:58

## 2019-01-01 RX ADMIN — SODIUM CHLORIDE 250 ML: 9 INJECTION, SOLUTION INTRAVENOUS at 10:58

## 2019-01-01 RX ADMIN — Medication 500 UNITS: at 09:57

## 2019-01-01 RX ADMIN — DEXAMETHASONE 4 MG: 4 TABLET ORAL at 14:03

## 2019-01-01 RX ADMIN — POTASSIUM CHLORIDE 20 MEQ: 29.8 INJECTION, SOLUTION INTRAVENOUS at 00:30

## 2019-01-01 RX ADMIN — OXYCODONE HYDROCHLORIDE 5 MG: 5 TABLET ORAL at 05:16

## 2019-01-01 RX ADMIN — Medication 5 ML: at 17:01

## 2019-01-01 RX ADMIN — Medication 500 MG: at 07:49

## 2019-01-01 RX ADMIN — Medication 1 UNITS: at 08:55

## 2019-01-01 RX ADMIN — DRONABINOL 2.5 MG: 2.5 CAPSULE ORAL at 17:49

## 2019-01-01 RX ADMIN — LIDOCAINE HYDROCHLORIDE 60 ML: 10 INJECTION, SOLUTION INFILTRATION; PERINEURAL at 12:09

## 2019-01-01 RX ADMIN — CEFTRIAXONE SODIUM 2 G: 2 INJECTION, POWDER, FOR SOLUTION INTRAMUSCULAR; INTRAVENOUS at 11:48

## 2019-01-01 RX ADMIN — LEVOTHYROXINE SODIUM 50 MCG: 0.03 TABLET ORAL at 07:59

## 2019-01-01 RX ADMIN — NOREPINEPHRINE BITARTRATE 6.4 MCG: 1 INJECTION INTRAVENOUS at 09:45

## 2019-01-01 RX ADMIN — MEBROFENIN 5.5 MCI.: 45 INJECTION, POWDER, LYOPHILIZED, FOR SOLUTION INTRAVENOUS at 15:31

## 2019-01-01 RX ADMIN — TAMSULOSIN HYDROCHLORIDE 0.4 MG: 0.4 CAPSULE ORAL at 08:29

## 2019-01-01 RX ADMIN — MAGNESIUM SULFATE IN WATER 2 G: 40 INJECTION, SOLUTION INTRAVENOUS at 10:14

## 2019-01-01 RX ADMIN — AMOXICILLIN AND CLAVULANATE POTASSIUM 1 TABLET: 875; 125 TABLET, FILM COATED ORAL at 11:01

## 2019-01-01 RX ADMIN — PROPOFOL 20 MG: 10 INJECTION, EMULSION INTRAVENOUS at 14:09

## 2019-01-01 RX ADMIN — SODIUM PHOSPHATE, MONOBASIC, MONOHYDRATE AND SODIUM PHOSPHATE, DIBASIC, ANHYDROUS 15 MMOL: 276; 142 INJECTION, SOLUTION INTRAVENOUS at 21:46

## 2019-01-01 RX ADMIN — SODIUM CHLORIDE, PRESERVATIVE FREE 5 ML: 5 INJECTION INTRAVENOUS at 13:07

## 2019-01-01 RX ADMIN — ONDANSETRON 4 MG: 2 INJECTION INTRAMUSCULAR; INTRAVENOUS at 12:10

## 2019-01-01 RX ADMIN — DEXAMETHASONE SODIUM PHOSPHATE 4 MG: 4 INJECTION, SOLUTION INTRA-ARTICULAR; INTRALESIONAL; INTRAMUSCULAR; INTRAVENOUS; SOFT TISSUE at 13:30

## 2019-01-01 RX ADMIN — OXYCODONE HYDROCHLORIDE 10 MG: 5 TABLET ORAL at 16:37

## 2019-01-01 RX ADMIN — SUGAMMADEX 70 MG: 100 INJECTION, SOLUTION INTRAVENOUS at 15:53

## 2019-01-01 RX ADMIN — TAMSULOSIN HYDROCHLORIDE 0.4 MG: 0.4 CAPSULE ORAL at 08:41

## 2019-01-01 RX ADMIN — PHENYLEPHRINE HYDROCHLORIDE 100 MCG: 10 INJECTION INTRAVENOUS at 08:12

## 2019-01-01 RX ADMIN — FAMOTIDINE 40 MG: 10 INJECTION INTRAVENOUS at 11:03

## 2019-01-01 RX ADMIN — SUGAMMADEX 110 MG: 100 INJECTION, SOLUTION INTRAVENOUS at 15:03

## 2019-01-01 RX ADMIN — SODIUM CHLORIDE 500 ML: 9 INJECTION, SOLUTION INTRAVENOUS at 23:10

## 2019-01-01 RX ADMIN — Medication 5 ML: at 15:07

## 2019-01-01 RX ADMIN — MIDAZOLAM 2 MG: 1 INJECTION INTRAMUSCULAR; INTRAVENOUS at 13:06

## 2019-01-01 RX ADMIN — CEFTRIAXONE SODIUM 2 G: 2 INJECTION, POWDER, FOR SOLUTION INTRAMUSCULAR; INTRAVENOUS at 11:07

## 2019-01-01 RX ADMIN — SODIUM CHLORIDE 1000 ML: 9 INJECTION, SOLUTION INTRAVENOUS at 16:35

## 2019-01-01 RX ADMIN — POTASSIUM PHOSPHATE, MONOBASIC AND POTASSIUM PHOSPHATE, DIBASIC 10 MMOL: 224; 236 INJECTION, SOLUTION INTRAVENOUS at 13:04

## 2019-01-01 RX ADMIN — CALCIUM CHLORIDE 500 MG: 100 INJECTION, SOLUTION INTRAVENOUS at 11:51

## 2019-01-01 RX ADMIN — POTASSIUM CHLORIDE 40 MEQ: 750 TABLET, EXTENDED RELEASE ORAL at 08:48

## 2019-01-01 RX ADMIN — MAGNESIUM OXIDE TAB 400 MG (241.3 MG ELEMENTAL MG) 400 MG: 400 (241.3 MG) TAB at 22:02

## 2019-01-01 RX ADMIN — IOPAMIDOL 51 ML: 755 INJECTION, SOLUTION INTRAVENOUS at 16:41

## 2019-01-01 RX ADMIN — Medication 0.03 MCG/KG/MIN: at 09:41

## 2019-01-01 RX ADMIN — LIDOCAINE HYDROCHLORIDE 100 MG: 20 INJECTION, SOLUTION INFILTRATION; PERINEURAL at 13:20

## 2019-01-01 RX ADMIN — FAMOTIDINE 40 MG: 10 INJECTION, SOLUTION INTRAVENOUS at 10:34

## 2019-01-01 RX ADMIN — FAMOTIDINE 40 MG: 10 INJECTION INTRAVENOUS at 10:35

## 2019-01-01 RX ADMIN — PACLITAXEL 138 MG: 6 INJECTION, SOLUTION, CONCENTRATE INTRAVENOUS at 11:21

## 2019-01-01 RX ADMIN — IPRATROPIUM BROMIDE AND ALBUTEROL SULFATE 3 ML: .5; 3 SOLUTION RESPIRATORY (INHALATION) at 10:33

## 2019-01-01 RX ADMIN — MAGNESIUM SULFATE IN WATER 4 G: 40 INJECTION, SOLUTION INTRAVENOUS at 10:59

## 2019-01-01 RX ADMIN — Medication 5 ML: at 12:30

## 2019-01-01 RX ADMIN — FENTANYL CITRATE 50 MCG: 50 INJECTION, SOLUTION INTRAMUSCULAR; INTRAVENOUS at 10:37

## 2019-01-01 RX ADMIN — TAMSULOSIN HYDROCHLORIDE 0.4 MG: 0.4 CAPSULE ORAL at 08:04

## 2019-01-01 RX ADMIN — ROCURONIUM BROMIDE 20 MG: 10 INJECTION INTRAVENOUS at 08:40

## 2019-01-01 RX ADMIN — SODIUM CHLORIDE 250 ML: 9 INJECTION, SOLUTION INTRAVENOUS at 11:00

## 2019-01-01 RX ADMIN — CEFTRIAXONE SODIUM 2 G: 2 INJECTION, POWDER, FOR SOLUTION INTRAMUSCULAR; INTRAVENOUS at 14:17

## 2019-01-01 RX ADMIN — POTASSIUM CHLORIDE 10 MEQ: 7.46 INJECTION, SOLUTION INTRAVENOUS at 00:05

## 2019-01-01 RX ADMIN — Medication 400 MG: at 08:34

## 2019-01-01 RX ADMIN — OXYCODONE HYDROCHLORIDE 5 MG: 5 TABLET ORAL at 12:31

## 2019-01-01 RX ADMIN — SODIUM CHLORIDE, SODIUM LACTATE, POTASSIUM CHLORIDE, CALCIUM CHLORIDE: 600; 310; 30; 20 INJECTION, SOLUTION INTRAVENOUS at 09:00

## 2019-01-01 RX ADMIN — HEPARIN SODIUM (PORCINE) LOCK FLUSH IV SOLN 100 UNIT/ML 5 ML: 100 SOLUTION at 18:39

## 2019-01-01 RX ADMIN — POLYETHYLENE GLYCOL 3350 17 G: 17 POWDER, FOR SOLUTION ORAL at 08:04

## 2019-01-01 RX ADMIN — MAGNESIUM SULFATE IN WATER 4 G: 40 INJECTION, SOLUTION INTRAVENOUS at 09:55

## 2019-01-01 RX ADMIN — SENNOSIDES AND DOCUSATE SODIUM 2 TABLET: 8.6; 5 TABLET ORAL at 08:14

## 2019-01-01 RX ADMIN — PANTOPRAZOLE SODIUM 40 MG: 40 TABLET, DELAYED RELEASE ORAL at 21:22

## 2019-01-01 RX ADMIN — DEXAMETHASONE SODIUM PHOSPHATE 12 MG: 10 INJECTION, SOLUTION INTRAMUSCULAR; INTRAVENOUS at 11:37

## 2019-01-01 RX ADMIN — ENOXAPARIN SODIUM 40 MG: 40 INJECTION SUBCUTANEOUS at 22:02

## 2019-01-01 RX ADMIN — POTASSIUM CHLORIDE AND SODIUM CHLORIDE: 900; 150 INJECTION, SOLUTION INTRAVENOUS at 18:48

## 2019-01-01 RX ADMIN — LIDOCAINE HYDROCHLORIDE 10 ML: 10 INJECTION, SOLUTION EPIDURAL; INFILTRATION; INTRACAUDAL; PERINEURAL at 12:58

## 2019-01-01 RX ADMIN — POTASSIUM CHLORIDE AND SODIUM CHLORIDE: 900; 150 INJECTION, SOLUTION INTRAVENOUS at 02:59

## 2019-01-01 RX ADMIN — LEVOTHYROXINE SODIUM 50 MCG: 50 TABLET ORAL at 08:33

## 2019-01-01 RX ADMIN — SODIUM CHLORIDE, POTASSIUM CHLORIDE, SODIUM LACTATE AND CALCIUM CHLORIDE 250 ML: 600; 310; 30; 20 INJECTION, SOLUTION INTRAVENOUS at 11:37

## 2019-01-01 RX ADMIN — DEXAMETHASONE SODIUM PHOSPHATE 4 MG: 10 INJECTION, SOLUTION INTRAMUSCULAR; INTRAVENOUS at 02:14

## 2019-01-01 RX ADMIN — FERROUS SULFATE TAB 325 MG (65 MG ELEMENTAL FE) 325 MG: 325 (65 FE) TAB at 17:50

## 2019-01-01 RX ADMIN — LIDOCAINE HYDROCHLORIDE 20 ML: 10 INJECTION, SOLUTION EPIDURAL; INFILTRATION; INTRACAUDAL; PERINEURAL at 11:23

## 2019-01-01 RX ADMIN — Medication 5 ML: at 01:35

## 2019-01-01 RX ADMIN — ERTAPENEM SODIUM 1 G: 1 INJECTION, POWDER, LYOPHILIZED, FOR SOLUTION INTRAMUSCULAR; INTRAVENOUS at 16:12

## 2019-01-01 RX ADMIN — PANTOPRAZOLE SODIUM 40 MG: 40 INJECTION, POWDER, FOR SOLUTION INTRAVENOUS at 08:13

## 2019-01-01 RX ADMIN — Medication 1 UNITS: at 10:06

## 2019-01-01 RX ADMIN — PANTOPRAZOLE SODIUM 40 MG: 40 TABLET, DELAYED RELEASE ORAL at 14:33

## 2019-01-01 RX ADMIN — OXYCODONE HYDROCHLORIDE 10 MG: 5 TABLET ORAL at 12:09

## 2019-01-01 RX ADMIN — SODIUM CHLORIDE, PRESERVATIVE FREE 5 ML: 5 INJECTION INTRAVENOUS at 13:42

## 2019-01-01 RX ADMIN — PHENYLEPHRINE HYDROCHLORIDE 100 MCG: 10 INJECTION INTRAVENOUS at 07:46

## 2019-01-01 RX ADMIN — GADOBUTROL 6 ML: 604.72 INJECTION INTRAVENOUS at 12:17

## 2019-01-01 RX ADMIN — SENNOSIDES AND DOCUSATE SODIUM 2 TABLET: 8.6; 5 TABLET ORAL at 19:34

## 2019-01-01 RX ADMIN — IOPAMIDOL 125 ML: 755 INJECTION, SOLUTION INTRAVENOUS at 13:31

## 2019-01-01 RX ADMIN — LIDOCAINE HYDROCHLORIDE 10 ML: 10 INJECTION, SOLUTION EPIDURAL; INFILTRATION; INTRACAUDAL; PERINEURAL at 11:29

## 2019-01-01 RX ADMIN — LEVOTHYROXINE SODIUM 50 MCG: 50 TABLET ORAL at 08:14

## 2019-01-01 RX ADMIN — OXYCODONE HYDROCHLORIDE 5 MG: 5 TABLET ORAL at 19:56

## 2019-01-01 RX ADMIN — IPRATROPIUM BROMIDE AND ALBUTEROL SULFATE 3 ML: .5; 3 SOLUTION RESPIRATORY (INHALATION) at 11:32

## 2019-01-01 RX ADMIN — DEXAMETHASONE SODIUM PHOSPHATE 4 MG: 10 INJECTION, SOLUTION INTRAMUSCULAR; INTRAVENOUS at 15:20

## 2019-01-01 RX ADMIN — PROPOFOL 100 MCG/KG/MIN: 10 INJECTION, EMULSION INTRAVENOUS at 09:29

## 2019-01-01 RX ADMIN — DIPHENHYDRAMINE HYDROCHLORIDE 25 MG: 25 CAPSULE ORAL at 10:32

## 2019-01-01 RX ADMIN — PACLITAXEL 138 MG: 6 INJECTION, SOLUTION INTRAVENOUS at 11:02

## 2019-01-01 RX ADMIN — OXYCODONE HYDROCHLORIDE 5 MG: 5 TABLET ORAL at 10:29

## 2019-01-01 RX ADMIN — CLOTRIMAZOLE 1 TROCHE: 10 LOZENGE ORAL at 13:49

## 2019-01-01 RX ADMIN — PROPOFOL 90 MG: 10 INJECTION, EMULSION INTRAVENOUS at 07:52

## 2019-01-01 RX ADMIN — POLYETHYLENE GLYCOL 3350 17 G: 17 POWDER, FOR SOLUTION ORAL at 08:07

## 2019-01-01 RX ADMIN — POTASSIUM CHLORIDE 10 MEQ: 7.46 INJECTION, SOLUTION INTRAVENOUS at 12:00

## 2019-01-01 RX ADMIN — POTASSIUM CHLORIDE 40 MEQ: 149 INJECTION, SOLUTION, CONCENTRATE INTRAVENOUS at 09:55

## 2019-01-01 RX ADMIN — DEXAMETHASONE 4 MG: 4 TABLET ORAL at 21:52

## 2019-01-01 RX ADMIN — DEXAMETHASONE SODIUM PHOSPHATE 12 MG: 10 INJECTION, SOLUTION INTRAMUSCULAR; INTRAVENOUS at 10:41

## 2019-01-01 RX ADMIN — ACETAMINOPHEN 650 MG: 325 TABLET, FILM COATED ORAL at 03:48

## 2019-01-01 RX ADMIN — SODIUM CHLORIDE TAB 1 GM 1 G: 1 TAB at 20:11

## 2019-01-01 RX ADMIN — CLOTRIMAZOLE 1 TROCHE: 10 LOZENGE ORAL at 13:08

## 2019-01-01 RX ADMIN — PANTOPRAZOLE SODIUM 40 MG: 40 TABLET, DELAYED RELEASE ORAL at 08:07

## 2019-01-01 RX ADMIN — DIPHENHYDRAMINE HYDROCHLORIDE 25 MG: 50 INJECTION INTRAMUSCULAR; INTRAVENOUS at 10:59

## 2019-01-01 RX ADMIN — FENTANYL CITRATE 50 MCG: 50 INJECTION INTRAMUSCULAR; INTRAVENOUS at 09:44

## 2019-01-01 RX ADMIN — ERTAPENEM SODIUM 1 G: 1 INJECTION, POWDER, LYOPHILIZED, FOR SOLUTION INTRAMUSCULAR; INTRAVENOUS at 14:04

## 2019-01-01 RX ADMIN — PHYTONADIONE 5 MG: 10 INJECTION, EMULSION INTRAMUSCULAR; INTRAVENOUS; SUBCUTANEOUS at 12:32

## 2019-01-01 RX ADMIN — PHENYLEPHRINE HYDROCHLORIDE 100 MCG: 10 INJECTION INTRAVENOUS at 14:23

## 2019-01-01 RX ADMIN — LIDOCAINE HYDROCHLORIDE 10 ML: 10 INJECTION, SOLUTION EPIDURAL; INFILTRATION; INTRACAUDAL; PERINEURAL at 08:55

## 2019-01-01 RX ADMIN — DEXAMETHASONE SODIUM PHOSPHATE 4 MG: 10 INJECTION, SOLUTION INTRAMUSCULAR; INTRAVENOUS at 05:04

## 2019-01-01 RX ADMIN — MAGNESIUM OXIDE TAB 400 MG (241.3 MG ELEMENTAL MG) 400 MG: 400 (241.3 MG) TAB at 08:04

## 2019-01-01 RX ADMIN — SODIUM CHLORIDE 500 ML: 9 INJECTION, SOLUTION INTRAVENOUS at 13:05

## 2019-01-01 RX ADMIN — POTASSIUM PHOSPHATE, MONOBASIC AND POTASSIUM PHOSPHATE, DIBASIC 15 MMOL: 224; 236 INJECTION, SOLUTION INTRAVENOUS at 02:46

## 2019-01-01 RX ADMIN — OXYCODONE HYDROCHLORIDE 5 MG: 5 TABLET ORAL at 17:18

## 2019-01-01 RX ADMIN — MAGNESIUM SULFATE IN WATER 2 G: 40 INJECTION, SOLUTION INTRAVENOUS at 11:12

## 2019-01-01 RX ADMIN — LIDOCAINE HYDROCHLORIDE 10 ML: 10 INJECTION, SOLUTION EPIDURAL; INFILTRATION; INTRACAUDAL; PERINEURAL at 11:13

## 2019-01-01 RX ADMIN — DEXAMETHASONE SODIUM PHOSPHATE 12 MG: 10 INJECTION, SOLUTION INTRAMUSCULAR; INTRAVENOUS at 10:37

## 2019-01-01 RX ADMIN — AMOXICILLIN AND CLAVULANATE POTASSIUM 1 TABLET: 875; 125 TABLET, FILM COATED ORAL at 20:17

## 2019-01-01 RX ADMIN — POTASSIUM CHLORIDE 20 MEQ: 29.8 INJECTION, SOLUTION INTRAVENOUS at 23:09

## 2019-01-01 RX ADMIN — DEXAMETHASONE SODIUM PHOSPHATE 4 MG: 4 INJECTION, SOLUTION INTRAMUSCULAR; INTRAVENOUS at 05:11

## 2019-01-01 RX ADMIN — PIPERACILLIN SODIUM AND TAZOBACTAM SODIUM 3.38 G: 3; .375 INJECTION, POWDER, LYOPHILIZED, FOR SOLUTION INTRAVENOUS at 17:49

## 2019-01-01 RX ADMIN — MAGNESIUM SULFATE HEPTAHYDRATE 2 G: 500 INJECTION, SOLUTION INTRAMUSCULAR; INTRAVENOUS at 10:22

## 2019-01-01 RX ADMIN — MAGNESIUM SULFATE HEPTAHYDRATE 4 G: 40 INJECTION, SOLUTION INTRAVENOUS at 00:44

## 2019-01-01 RX ADMIN — FAMOTIDINE 40 MG: 10 INJECTION, SOLUTION INTRAVENOUS at 11:01

## 2019-01-01 RX ADMIN — PACLITAXEL 138 MG: 6 INJECTION, SOLUTION INTRAVENOUS at 11:25

## 2019-01-01 RX ADMIN — DEXAMETHASONE SODIUM PHOSPHATE 4 MG: 10 INJECTION, SOLUTION INTRAMUSCULAR; INTRAVENOUS at 00:54

## 2019-01-01 RX ADMIN — CEFTRIAXONE SODIUM 2 G: 2 INJECTION, POWDER, FOR SOLUTION INTRAMUSCULAR; INTRAVENOUS at 11:41

## 2019-01-01 RX ADMIN — DIPHENHYDRAMINE HYDROCHLORIDE 25 MG: 25 CAPSULE ORAL at 10:37

## 2019-01-01 RX ADMIN — FAMOTIDINE 40 MG: 10 INJECTION INTRAVENOUS at 10:59

## 2019-01-01 RX ADMIN — DEXAMETHASONE SODIUM PHOSPHATE 4 MG: 10 INJECTION, SOLUTION INTRAMUSCULAR; INTRAVENOUS at 13:49

## 2019-01-01 RX ADMIN — Medication 5 ML: at 21:58

## 2019-01-01 RX ADMIN — AMOXICILLIN AND CLAVULANATE POTASSIUM 1 TABLET: 875; 125 TABLET, FILM COATED ORAL at 22:02

## 2019-01-01 RX ADMIN — PIPERACILLIN SODIUM AND TAZOBACTAM SODIUM 3.38 G: 3; .375 INJECTION, POWDER, LYOPHILIZED, FOR SOLUTION INTRAVENOUS at 22:45

## 2019-01-01 RX ADMIN — CIPROFLOXACIN HYDROCHLORIDE 500 MG: 500 TABLET, FILM COATED ORAL at 08:18

## 2019-01-01 RX ADMIN — SODIUM CHLORIDE 57 ML: 9 INJECTION, SOLUTION INTRAVENOUS at 09:19

## 2019-01-01 RX ADMIN — LEVOTHYROXINE SODIUM 50 MCG: 50 TABLET ORAL at 08:23

## 2019-01-01 RX ADMIN — MAGNESIUM OXIDE TAB 400 MG (241.3 MG ELEMENTAL MG) 400 MG: 400 (241.3 MG) TAB at 20:06

## 2019-01-01 RX ADMIN — POTASSIUM CHLORIDE 40 MEQ: 1500 TABLET, EXTENDED RELEASE ORAL at 11:14

## 2019-01-01 RX ADMIN — PIPERACILLIN SODIUM AND TAZOBACTAM SODIUM 3.38 G: 3; .375 INJECTION, POWDER, LYOPHILIZED, FOR SOLUTION INTRAVENOUS at 13:49

## 2019-01-01 RX ADMIN — MIDAZOLAM 2 MG: 1 INJECTION INTRAMUSCULAR; INTRAVENOUS at 11:28

## 2019-01-01 RX ADMIN — MAGNESIUM OXIDE TAB 400 MG (241.3 MG ELEMENTAL MG) 400 MG: 400 (241.3 MG) TAB at 20:17

## 2019-01-01 RX ADMIN — SODIUM CHLORIDE 1000 ML: 9 INJECTION, SOLUTION INTRAVENOUS at 08:23

## 2019-01-01 RX ADMIN — ONDANSETRON 4 MG: 2 INJECTION INTRAMUSCULAR; INTRAVENOUS at 09:47

## 2019-01-01 RX ADMIN — Medication 5 ML: at 17:42

## 2019-01-01 RX ADMIN — POTASSIUM CHLORIDE AND SODIUM CHLORIDE: 900; 150 INJECTION, SOLUTION INTRAVENOUS at 17:54

## 2019-01-01 RX ADMIN — POTASSIUM PHOSPHATE, MONOBASIC AND POTASSIUM PHOSPHATE, DIBASIC 15 MMOL: 224; 236 INJECTION, SOLUTION INTRAVENOUS at 00:18

## 2019-01-01 RX ADMIN — FERROUS SULFATE TAB 325 MG (65 MG ELEMENTAL FE) 325 MG: 325 (65 FE) TAB at 12:35

## 2019-01-01 RX ADMIN — POTASSIUM CHLORIDE AND SODIUM CHLORIDE: 900; 150 INJECTION, SOLUTION INTRAVENOUS at 22:45

## 2019-01-01 RX ADMIN — FENTANYL CITRATE 50 MCG: 50 INJECTION, SOLUTION INTRAMUSCULAR; INTRAVENOUS at 09:28

## 2019-01-01 ASSESSMENT — ENCOUNTER SYMPTOMS
DIARRHEA: 1
FEVER: 0
TREMORS: 0
NIGHT SWEATS: 0
DIZZINESS: 0
HEARTBURN: 1
NAUSEA: 0
SINUS CONGESTION: 0
ALTERED TEMPERATURE REGULATION: 0
NAUSEA: 0
ORTHOPNEA: 0
DOUBLE VISION: 0
TASTE DISTURBANCE: 0
HOARSE VOICE: 0
DECREASED CONCENTRATION: 0
FATIGUE: 1
BOWEL INCONTINENCE: 0
EYE REDNESS: 0
SHORTNESS OF BREATH: 0
CHILLS: 0
DECREASED APPETITE: 0
DIARRHEA: 1
BREAST MASS: 0
NERVOUS/ANXIOUS: 0
DIARRHEA: 0
SINUS PAIN: 0
HYPERTENSION: 0
LOSS OF CONSCIOUSNESS: 0
EYE WATERING: 0
BACK PAIN: 0
SPEECH CHANGE: 0
DEPRESSION: 0
BREAST PAIN: 0
TINGLING: 1
SKIN CHANGES: 0
INSOMNIA: 0
DIZZINESS: 0
NECK MASS: 0
JAUNDICE: 0
SINUS CONGESTION: 0
STIFFNESS: 0
BREAST MASS: 0
HALLUCINATIONS: 0
DECREASED LIBIDO: 0
LIGHT-HEADEDNESS: 0
FATIGUE: 1
PANIC: 0
DIZZINESS: 1
LEG PAIN: 0
NUMBNESS: 0
HEMATURIA: 0
COUGH: 0
LIGHT-HEADEDNESS: 0
BREAST PAIN: 0
HEARTBURN: 0
CLAUDICATION: 0
ORTHOPNEA: 0
BOWEL INCONTINENCE: 0
POLYDIPSIA: 0
COUGH: 0
TROUBLE SWALLOWING: 0
SPEECH CHANGE: 0
NAUSEA: 1
WEIGHT LOSS: 0
CLAUDICATION: 0
RECTAL PAIN: 0
EXTREMITY NUMBNESS: 0
SLEEP DISTURBANCES DUE TO BREATHING: 0
JAUNDICE: 0
BLOOD IN STOOL: 0
NECK MASS: 0
HEARTBURN: 0
ABDOMINAL PAIN: 0
WHEEZING: 0
CHILLS: 0
HYPOTENSION: 0
POLYDIPSIA: 0
SPUTUM PRODUCTION: 0
BRUISES/BLEEDS EASILY: 0
SORE THROAT: 0
RECTAL PAIN: 0
POSTURAL DYSPNEA: 0
VOMITING: 0
DEPRESSION: 0
SHORTNESS OF BREATH: 1
INCREASED ENERGY: 0
SPUTUM PRODUCTION: 0
COUGH: 0
SLEEP DISTURBANCES DUE TO BREATHING: 0
HYPOTENSION: 0
LEG SWELLING: 0
SKIN CHANGES: 0
FEVER: 0
NECK PAIN: 0
HYPERTENSION: 0
POOR WOUND HEALING: 0
PARALYSIS: 0
POLYPHAGIA: 0
MUSCLE CRAMPS: 0
TACHYCARDIA: 0
CONSTIPATION: 0
DYSURIA: 0
WEIGHT LOSS: 0
POSTURAL DYSPNEA: 0
INCREASED ENERGY: 0
CONSTIPATION: 0
HOT FLASHES: 0
NAUSEA: 1
BLOOD IN STOOL: 0
MUSCLE CRAMPS: 0
WEAKNESS: 1
NECK PAIN: 0
FEVER: 0
VOMITING: 0
POLYPHAGIA: 0
STIFFNESS: 0
ABDOMINAL PAIN: 1
RECTAL BLEEDING: 0
JOINT SWELLING: 0
TACHYCARDIA: 0
SINUS PAIN: 0
PARALYSIS: 0
BLOATING: 1
SWOLLEN GLANDS: 0
DECREASED CONCENTRATION: 0
WHEEZING: 0
CONSTIPATION: 1
LEG SWELLING: 0
BACK PAIN: 0
NUMBNESS: 0
SNORES LOUDLY: 0
HOARSE VOICE: 0
NAIL CHANGES: 0
CHILLS: 0
JOINT SWELLING: 0
DOUBLE VISION: 0
SHORTNESS OF BREATH: 1
BRUISES/BLEEDS EASILY: 0
SYNCOPE: 0
FATIGUE: 1
EXERCISE INTOLERANCE: 0
LOSS OF CONSCIOUSNESS: 0
SORE THROAT: 0
DECREASED LIBIDO: 0
EXTREMITY NUMBNESS: 0
HEMATURIA: 0
NIGHT SWEATS: 0
ABDOMINAL PAIN: 0
HEMOPTYSIS: 0
PALPITATIONS: 0
TINGLING: 1
COUGH DISTURBING SLEEP: 0
DECREASED APPETITE: 0
VOMITING: 1
WEAKNESS: 0
LEG PAIN: 0
SEIZURES: 0
DIARRHEA: 1
WEIGHT GAIN: 0
HOT FLASHES: 0
VOMITING: 0
WHEEZING: 0
JAUNDICE: 0
RESPIRATORY PAIN: 0
MUSCLE WEAKNESS: 0
BLOATING: 0
TREMORS: 0
SYNCOPE: 0
MEMORY LOSS: 0
FLANK PAIN: 0
MYALGIAS: 0
WEAKNESS: 0
EYE REDNESS: 0
DIFFICULTY URINATING: 0
DISTURBANCES IN COORDINATION: 0
DYSPNEA ON EXERTION: 0
EYE PAIN: 0
INSOMNIA: 0
ABDOMINAL PAIN: 0
VOMITING: 0
DYSURIA: 0
HALLUCINATIONS: 0
COUGH DISTURBING SLEEP: 0
ABDOMINAL PAIN: 1
HEMOPTYSIS: 0
SMELL DISTURBANCE: 0
RESPIRATORY PAIN: 0
PANIC: 0
SNORES LOUDLY: 0
SWOLLEN GLANDS: 0
EYE IRRITATION: 0
BLOATING: 0
CONSTIPATION: 1
COUGH: 1
EYE PAIN: 0
FATIGUE: 1
BOWEL INCONTINENCE: 0
HEADACHES: 0
TROUBLE SWALLOWING: 0
MUSCLE WEAKNESS: 0
RECTAL PAIN: 0
SEIZURES: 0
MYALGIAS: 0
EYE IRRITATION: 0
NERVOUS/ANXIOUS: 0
EYE WATERING: 0
PALPITATIONS: 0
SMELL DISTURBANCE: 0
BLOOD IN STOOL: 0
DYSURIA: 0
CHILLS: 0
HEADACHES: 0
FEVER: 0
DYSPNEA ON EXERTION: 0
TASTE DISTURBANCE: 0
FLANK PAIN: 0
RECTAL BLEEDING: 0
ALTERED TEMPERATURE REGULATION: 0
WEIGHT GAIN: 0
DISTURBANCES IN COORDINATION: 0
SHORTNESS OF BREATH: 0
ARTHRALGIAS: 0
POOR WOUND HEALING: 0
DIARRHEA: 0
ARTHRALGIAS: 0
EXERCISE INTOLERANCE: 0
DIFFICULTY URINATING: 0
NAIL CHANGES: 0
MEMORY LOSS: 0

## 2019-01-01 ASSESSMENT — MIFFLIN-ST. JEOR
SCORE: 1190.49
SCORE: 1155.1
SCORE: 1138.77
SCORE: 1101.12
SCORE: 1091.88
SCORE: 1114.73
SCORE: 1103.39
SCORE: 1210.45
SCORE: 1219.52
SCORE: 1190.49
SCORE: 1183.8
SCORE: 1097.95
SCORE: 1091.88
SCORE: 1138.55
SCORE: 1082.53
SCORE: 1155.56
SCORE: 1164.63
SCORE: 1139.23
SCORE: 1112.46
SCORE: 1145.88
SCORE: 1116.09

## 2019-01-01 ASSESSMENT — ACTIVITIES OF DAILY LIVING (ADL)
BATHING: 0-->INDEPENDENT
ADLS_ACUITY_SCORE: 11
ADLS_ACUITY_SCORE: 10
FALL_HISTORY_WITHIN_LAST_SIX_MONTHS: NO
ADLS_ACUITY_SCORE: 11
ADLS_ACUITY_SCORE: 12
DRESS: 0-->INDEPENDENT
DRESS: 0-->INDEPENDENT
ADLS_ACUITY_SCORE: 11
ADLS_ACUITY_SCORE: 13
ADLS_ACUITY_SCORE: 10
ADLS_ACUITY_SCORE: 11
TOILETING: 0-->INDEPENDENT
ADLS_ACUITY_SCORE: 10
ADLS_ACUITY_SCORE: 12
ADLS_ACUITY_SCORE: 11
ADLS_ACUITY_SCORE: 12
RETIRED_EATING: 0-->INDEPENDENT
ADLS_ACUITY_SCORE: 12
PREVIOUS_RESPONSIBILITIES: MEAL PREP;SHOPPING;MEDICATION MANAGEMENT;DRIVING
ADLS_ACUITY_SCORE: 11
ADLS_ACUITY_SCORE: 13
ADLS_ACUITY_SCORE: 12
ADLS_ACUITY_SCORE: 10
ADLS_ACUITY_SCORE: 11
ADLS_ACUITY_SCORE: 12
RETIRED_COMMUNICATION: 0-->UNDERSTANDS/COMMUNICATES WITHOUT DIFFICULTY
ADLS_ACUITY_SCORE: 10
BATHING: 0-->INDEPENDENT
TOILETING: 0-->INDEPENDENT
ADLS_ACUITY_SCORE: 11
ADLS_ACUITY_SCORE: 12
ADLS_ACUITY_SCORE: 11
ADLS_ACUITY_SCORE: 13
ADLS_ACUITY_SCORE: 12
ADLS_ACUITY_SCORE: 11
ADLS_ACUITY_SCORE: 11
ADLS_ACUITY_SCORE: 10
ADLS_ACUITY_SCORE: 12
ADLS_ACUITY_SCORE: 13
ADLS_ACUITY_SCORE: 12
AMBULATION: 0-->INDEPENDENT
ADLS_ACUITY_SCORE: 12
ADLS_ACUITY_SCORE: 10
ADLS_ACUITY_SCORE: 10
RETIRED_COMMUNICATION: 0-->UNDERSTANDS/COMMUNICATES WITHOUT DIFFICULTY
ADLS_ACUITY_SCORE: 12
ADLS_ACUITY_SCORE: 13
ADLS_ACUITY_SCORE: 11
ADLS_ACUITY_SCORE: 11
ADLS_ACUITY_SCORE: 13
COGNITION: 0 - NO COGNITION ISSUES REPORTED
ADLS_ACUITY_SCORE: 11
ADLS_ACUITY_SCORE: 12
ADLS_ACUITY_SCORE: 12
AMBULATION: 0-->INDEPENDENT
ADLS_ACUITY_SCORE: 11
ADLS_ACUITY_SCORE: 10
ADLS_ACUITY_SCORE: 11
FALL_HISTORY_WITHIN_LAST_SIX_MONTHS: NO
ADLS_ACUITY_SCORE: 10
ADLS_ACUITY_SCORE: 10
RETIRED_COMMUNICATION: 0-->UNDERSTANDS/COMMUNICATES WITHOUT DIFFICULTY
ADLS_ACUITY_SCORE: 12
COGNITION: 0 - NO COGNITION ISSUES REPORTED
ADLS_ACUITY_SCORE: 11
ADLS_ACUITY_SCORE: 11
RETIRED_EATING: 0-->INDEPENDENT
ADLS_ACUITY_SCORE: 10
ADLS_ACUITY_SCORE: 12
ADLS_ACUITY_SCORE: 11
ADLS_ACUITY_SCORE: 12
SWALLOWING: 0-->SWALLOWS FOODS/LIQUIDS WITHOUT DIFFICULTY
AMBULATION: 0-->INDEPENDENT
ADLS_ACUITY_SCORE: 11
ADLS_ACUITY_SCORE: 12
ADLS_ACUITY_SCORE: 11
ADLS_ACUITY_SCORE: 10
ADLS_ACUITY_SCORE: 12
TRANSFERRING: 0-->INDEPENDENT
ADLS_ACUITY_SCORE: 11
ADLS_ACUITY_SCORE: 11
ADLS_ACUITY_SCORE: 10
SWALLOWING: 0-->SWALLOWS FOODS/LIQUIDS WITHOUT DIFFICULTY
ADLS_ACUITY_SCORE: 12
FALL_HISTORY_WITHIN_LAST_SIX_MONTHS: NO
IADL_COMMENTS: FAMILY ABLE TO ASSIST WITH IADLS PRN
ADLS_ACUITY_SCORE: 10
SWALLOWING: 0-->SWALLOWS FOODS/LIQUIDS WITHOUT DIFFICULTY
ADLS_ACUITY_SCORE: 12
DRESS: 0-->INDEPENDENT
RETIRED_EATING: 0-->INDEPENDENT
COGNITION: 0 - NO COGNITION ISSUES REPORTED
ADLS_ACUITY_SCORE: 11
TRANSFERRING: 0-->INDEPENDENT
ADLS_ACUITY_SCORE: 11
TOILETING: 0-->INDEPENDENT
ADLS_ACUITY_SCORE: 11
ADLS_ACUITY_SCORE: 14
ADLS_ACUITY_SCORE: 12
ADLS_ACUITY_SCORE: 12
ADLS_ACUITY_SCORE: 13
BATHING: 0-->INDEPENDENT
ADLS_ACUITY_SCORE: 11
TRANSFERRING: 0-->INDEPENDENT

## 2019-01-01 ASSESSMENT — PAIN SCALES - GENERAL
PAINLEVEL: NO PAIN (0)
PAINLEVEL: NO PAIN (0)
PAINLEVEL: MILD PAIN (3)
PAINLEVEL: NO PAIN (0)
PAINLEVEL: MODERATE PAIN (4)
PAINLEVEL: NO PAIN (0)
PAINLEVEL: MILD PAIN (3)
PAINLEVEL: NO PAIN (0)
PAINLEVEL: MODERATE PAIN (4)
PAINLEVEL: NO PAIN (0)
PAINLEVEL: NO PAIN (0)
PAINLEVEL: MODERATE PAIN (5)
PAINLEVEL: NO PAIN (0)
PAINLEVEL: NO PAIN (0)

## 2019-01-01 ASSESSMENT — LIFESTYLE VARIABLES
TOBACCO_USE: 1

## 2019-01-01 ASSESSMENT — PAIN DESCRIPTION - DESCRIPTORS
DESCRIPTORS: ACHING
DESCRIPTORS: SORE
DESCRIPTORS: HEADACHE
DESCRIPTORS: ACHING
DESCRIPTORS: HEADACHE;SORE
DESCRIPTORS: ACHING
DESCRIPTORS: ACHING
DESCRIPTORS: ACHING;CRAMPING
DESCRIPTORS: HEADACHE;SORE

## 2019-01-22 NOTE — PROGRESS NOTES
Nursing Note:  Odalys Nathan presents today for port labs.    Patient seen by provider today: No   present during visit today: Not Applicable.    Note: N/A.    Intravenous Access:  Labs drawn without difficulty.  Implanted Port.    Discharge Plan:   Patient was sent to home.    Gloria Proctor RN

## 2019-01-25 NOTE — PROGRESS NOTES
Infusion Nursing Note:  Odalys Nathan presents today for Magnesium.    Patient seen by provider today: No   present during visit today: Not Applicable.    Note: Odalys wanted magnesium only. No IVF today.    Intravenous Access:  Implanted Port.    Treatment Conditions:  Not Applicable.      Post Infusion Assessment:  Patient tolerated infusion without incident.  Blood return noted pre and post infusion.  Site patent and intact, free from redness, edema or discomfort.  No evidence of extravasations.  Access discontinued per protocol.    Discharge Plan:   Patient declined prescription refills.  Discharge instructions reviewed with: Patient.  Patient and/or family verbalized understanding of discharge instructions and all questions answered.  Copy of AVS reviewed with patient and/or family.   Patient discharged in stable condition accompanied by: self.  Departure Mode: Ambulatory.    Payton Flynn RN

## 2019-01-30 NOTE — PROGRESS NOTES
Care Coordinator Note  Called patient and left a message to give us a call back regarding the plan for her Ca-125 result and future draw and MG     If pt continues to feel well will repeat the Ca-125 prior to her visit with Dr Yeung in March   Monthly MG levels.   Tammy CISNEROS, RN, OCN  Care Coordinator   Gynecologic Cancer   Office [phone]  Pager [phone]

## 2019-01-31 NOTE — PROGRESS NOTES
.contacted pt.  Care Coordinator Note  She was aware of the results of the Mg and ca-ast   Will have MG ddraw in February and replace as needed and then again in March along with her Ca-125  Uness she feels symptomatic of of disease before March.       Patient verbalized back understanding of the above information discussed.   .dms

## 2019-02-19 NOTE — PROGRESS NOTES
Infusion Nursing Note:  Odalys Nathan presents today for Lab draw, IVF, Magnesium replacement.    Patient seen by provider today: No   present during visit today: Not Applicable.    Note: N/A.    Intravenous Access:  Labs drawn without difficulty.  Implanted Port.    Treatment Conditions:  Lab Results   Component Value Date    MAG 1.4 02/19/2019           Results reviewed, labs MET treatment parameters, ok to proceed with treatment.     Post Infusion Assessment:  Patient tolerated infusion without incident.  Blood return noted pre and post infusion.  Site patent and intact, free from redness, edema or discomfort.  No evidence of extravasations.  Access discontinued per protocol.    Discharge Plan:   Discharge instructions reviewed with: Patient.  Patient and/or family verbalized understanding of discharge instructions and all questions answered.  AVS to patient via [employer].  Patient will return 3/11 for next appointment.   Patient discharged in stable condition accompanied by: self.  Departure Mode: Ambulatory.    Elly Mcfadden RN

## 2019-03-11 NOTE — PROGRESS NOTES
Infusion Nursing Note:  Odalys Nathan presents today for labs and Mg replacement.    Patient seen by provider today: No   present during visit today: Not Applicable.    Note: Mg 1.4 today--replaced with 2gm Mag Sulfate per protocol.    Intravenous Access:  Labs drawn without difficulty.  Implanted Port.    Treatment Conditions:  Not Applicable.      Post Infusion Assessment:  Patient tolerated infusion without incident.  Blood return noted pre and post infusion.  Site patent and intact, free from redness, edema or discomfort.  No evidence of extravasations.  Access discontinued per protocol.    Discharge Plan:   Discharge instructions reviewed with: Patient.  Patient and/or family verbalized understanding of discharge instructions and all questions answered.  AVS to patient via [employer].  Patient will return 3/14/19 for her 3 month f/[facility].   Patient discharged in stable condition accompanied by: self.  Departure Mode: Ambulatory.    Loree Ellis RN

## 2019-03-14 NOTE — NURSING NOTE
New chemo regime-weekly taxol-chemo care coordinator met with pt   CT CAP ordered-pt scheduled at [facility] 3/16/19

## 2019-03-14 NOTE — PROGRESS NOTES
Care Coordinator Note  Reviewed Dr. Yeung's plan for weekly Taxol with patient and .  Patient states she has been on weekly Taxol in the past.  She would like to start next Thursday at [facility].  She has a vacation planned for the end of April and is requesting treatment on Wednesday and then Tuesday the last two weeks of this cycle.  Chemo card on Taxol given to patient.  Patient will have a CT prior to treatment start.    Patient verbalized back understanding of the above information discussed.     Theodora Youngblood MSN, RN  Care Coordinator  Gynecologic Cancer   Office:  [phone]  Pager: [phone] #[phone]

## 2019-03-14 NOTE — NURSING NOTE
"Oncology Rooming Note    March 14, 2019 2:10 PM   Odalys Nathan is a 58 year old female who presents for:    Chief Complaint   Patient presents with     Oncology Clinic Visit     Return visit related to Ovarian Cancer     Initial Vitals: /85 (BP Location: Right arm, Patient Position: Sitting, Cuff Size: Adult Regular)   Pulse 88   Temp 98.1  F (36.7  C) (Oral)   Resp 18   Ht 1.651 m (5' 5\")   Wt 64.9 kg (143 lb)   SpO2 96%   BMI 23.80 kg/m   Estimated body mass index is 23.8 kg/m  as calculated from the following:    Height as of this encounter: 1.651 m (5' 5\").    Weight as of this encounter: 64.9 kg (143 lb). Body surface area is 1.73 meters squared.  No Pain (0) Comment: Data Unavailable   No LMP recorded. Patient has had a hysterectomy.  Allergies reviewed: Yes  Medications reviewed: Yes    Medications: Medication refills not needed today.  Pharmacy name entered into abcdexperts:    [id number] - [place], [place] - [address]  [place] [facility] - [place], [place] - [address]  S[place] [address] [address] [facility] [place], [place] - [address]  [place] [facility] [place], [place] - [address] [facility] [facility]    Clinical concerns: No new concerns. Provider was notified.      Keyona Pal LPN            "

## 2019-03-14 NOTE — PROGRESS NOTES
Gynecologic Oncology Follow-Up Note    RE: Odalys Nathan  MRN: [id number]  : 1960  Date of Visit: 2019    CC: Odalys Nathan is a 58 year old year old female with recurrent stage IIIC bilateral ovarian cancer who presents today for disease management and treatment planning. She has been off of chemo.      HPI: Odalys presents to clinic today feeling well.  is increasing, now 147. She reports feeling better for a little bit being off of chemo, but she is starting to feel worse. She reports diarrhea. She has been eating and drinking well. She feels a mass along her R flank. Denies nausea or vomiting. No fever or chills. No chest pain or SOB. She is active and is getting out of bed and partaking in daily activities. Weight has been stable.     Brief Oncology History:  2012 - Admitted to hospital for 2 weeks of intermittent abdominal cramping, distention, diarrhea and N/V. CT of abdomen/pelvis significant for small bowel obstruction, a heterogenous soft tissue density in the pelvis, omental nodules, and ascites. Bilateral adnexal masses per U/S of pelvis. CA-125 was elevated at 987, CEA was normal at 1.0.    12 - Therapeutic paracentesis (4 L) with cytology confirming malignancy (VT positive, weak ER positive, CK-7 positive consistent with GYN primary). Surgery recommended d/t potential for falling blood counts 2/2 chemotherapy (patient is [employer] and limiting blood transfusion)    12 - Exploratory laparotomy, MAR BSO, lysis of adhesion, Appendectomy, Repair cystotomy, Omentectomy Cpxf-ulolag-sdvwidnst-transverse-colon resection, Ileo-descending colon anastomosis, CUSA, & Colonoscopy (done by Dr. Amato and colorectal team).  2012 - Admitted to hospital for bilateral pulmonary emboli and drainage of pleural effusion. Started on Lovenox.  12-3/21/12: Cycle #1-2 Carbo/Taxol IV  3/29/12 - Started on Keflex by her PCP for infection in her healing wound  (immediately below her umbilicus).    4/11/12-6/14/12: Cycle #3-6 IV chemo, Cycle #1 IV/IP chemo  7/16/12 -  8, CT PRADEEP - enrolled in  - observation arm  3/4/13-6/28/13:  6, 9, 12, 15, 20.  7/1/13: CT Chest, Abdomen, Pelvis IMPRESSION:  1. Worsening metastatic ovarian carcinoma suggested by increased size of soft tissue nodules anterior to the right psoas muscle, that may represent growing mesenteric lymphadenopathy.  2. Remaining prominent right lower quadrant mesenteric lymph nodes are not significantly changed from CT 7/16/2012.  3. Clustered nodular opacities in the right lower lobe are not significant change from 7/16/2012 and remain indeterminate. Again, the appearance and distribution is suggestive of an infectious etiology.  4. Stable 8 mm soft tissue nodule in left breast, unchanged since at least 02/20/2012. This can be observed on followup studies, but correlation with mammography could be considered.  Decision made to start Doxil/Carbo.  7/11/13: The left ventricular ejection fraction is normal at 66.4%.  7/12/13-9/6/13: Cycle #1-3 Doxil/Carbo.  10, 11.    9/30/13 CT C/A/P Impression:  1. Overall, favorable response to treatment with decreasing size of soft tissue nodules tracking along the anterior aspect of the right psoas muscle.  2. 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  3. Improved cluster of right lower lobe pulmonary nodular opacities. These may represent resolving infection.  10/3/13-12/9/13:  9, 8, 10. Cycle 4-6 Doxil/Carbo.    1/13/14 CT C/A/P Impression:   1. Stable appearance of metastatic ovarian cancer. Scattered soft tissue nodules along the anterior aspect of the right psoas muscle are unchanged in size. Mild mesenteric lymphadenopathy is unchanged.  2. Clustered micronodules in the right lower lobe are unchanged from 9/30/2013, but improved from 7/1/2013. This history suggests a postinflammatory/postinfectious etiology.  3. Unchanged thrombosis of  the right ovarian vein.  1/16/14 Discussed multiple options for her based on relatively stable-appearing disease on CT but slight increase in  (which has had small increase to 20 with last recurrence) including chemo break with recheck of  in 1 month, starting new chemo agent immediately, and exploratory surgery with possible resection of nodules. She is considered platinum-sensitive based on > 1 year remission after Taxol/Carbo, which we will take into consideration for future chemo planning. I am not inclined to surgery at this time given difficulty she already has with diarrhea secondary to past colon resection. I suspect we would need to resect further bowel due to mesenteric disease. Also explained inherent risks of any major surgery. Also mentioned maintenance chemo, but this has not been shown to increase overall survival and would likely decrease her quality of life without significant benefit. Family is going on vacation to [place] in 2 weeks and Odalys does not want to have chemo prior to that, so will plan to take 1 month break. She can have  at that time (discussed checking toady since last draw was in early December, but as it would likely not change treatment plan and she has h/o slow rising , will not check today).  2/17/14  16    2/20/14: Decision to take break from chemo for two months, followed by CT and CA-125.    4/21/14  27  4/21/14 CT C/A/P Impression:    1. Increased size of 2 low-attenuation lymph nodes anterior to the right psoas muscle is concerning for worsening metastatic ovarian cancer.    2. New circumferential thickening of a 3.8 cm length segment of distal transverse colon is likely physiologic. Recommend attention on followup imaging.    3. Grossly unchanged size of clustered small nodules versus scarring in the right lower lobe the lungs.    4. Stable thrombosis of the right ovarian vein.  5/14/14: Diagnostic laparoscopy converted to exploratory  "laparotomy and removal of mesenteric masses, tumor debulking, peritoneal biopsies and intraperitoneal port placement. On laparoscopy, it was noted that there were small nodules on the anterior abdominal wall near the previous incision, small nodules on the right pelvic sidewall as well. Nodules were palpated in the mesentery; however, as it was unable to clarify where the origin of the nodules was, the decision was made to open the patient. On opening there was found to be approximately a 3 cm nodule in the small bowel mesentery and another separate approximately 2 cm nodule in the bowel mesentery. Pelvis without evidence of cancer, some mesenteric lymph nodes were palpated. No evidence otherwise of any disseminated cancer throughout the abdomen.    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 (2/2)  -Largest metastasis: 1.3 cm  -See comment  D: Peritoneum, right paracolic gutter #2, biopsy:  -Fibroadipose tissue with granulomatous inflammation surrounding refractile material  -Negative for malignancy   E: Small bowel adhesion, biopsy:  -Fibroconnective tissue, consistent with adhesion  -Negative for malignancy  F: Lymph nodes, mesentry, not otherwise specified, excision:  -Two lymph nodes, negative for metastatic carcinoma (0/2)  G: Lymph node, mesentery, \"#2\", excision:  -One lymph node, negative for metastatic carcinoma (0/1)  H: Lymph nodes, mesentery, \"nodule #2\", excision:  -Five lymph nodes, negative for metastatic carcinoma (0/5)  COMMENT:  Some of the specimens show post-operative changes. Others show possible treatment related changes, including necrosis. The " metastatic carcinoma in the mesenteric lymph nodes (specimen C) shows variable morphology, including relatively low grade tumor with papillary architecture, and high grade tumor comprised of nests of tumor cells with irregular, slit-like spaces and marked nuclear pleomorphism.    5/29/14: Cycle 1 IV PACLitaxel / IP CISplatin / IP PACLitaxel.  - 28.  6/26/14: Cycle #2 IV/IP.  10  8/5/14: CT chest/abd/pelvis IMPRESSION     1. In this patient with ovarian cancer, overall findings are indicative of stable/slight improvement, as multiple mesenteric lymphadenopathy and scattered nodular peritoneal soft tissue mass lesions appear unchanged or slightly smaller since 4/21/2014.    2. 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 Mild dilatation of the second and the third portion of the duodenum with a narrow SMA angle. This could represent SMA syndrome, if clinically correlated.17/14:    Cycle #3 IV/IP.  10.    CT chest/abd/pelvis with contrast on 8/5/14    Impression:    1. In this patient with ovarian cancer, overall findings are indicative of stable/slight improvement, as multiple mesenteric lymphadenopathy and scattered nodular peritoneal soft tissue mass lesions appear unchanged or slightly smaller since 4/21/2014.    2. 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 Mild dilatation of the second and the third portion of the duodenum with a narrow SMA angle. This could represent SMA syndrome, if clinically correlated.  8/7/14: Cycle #4 Taxol/Carbo (changed from IV/IP).  10. She has been feeling okay. She is unsure if she can finish out the course of 6 cycles IV/IP taxol/cisplatin. She feels like she has the flu for about a week then starts feeling gradually better after each chemo cycle. Her spouse notes that she actually has been more sick with the treatments than she initially admits here. She was also previously having some rib pain. Denies any rib pain now. Denies  any chest pain or shortness of breath.  Plan: discussed recent CT cap results and switching to just IV as she is feeling miserable with IP treatments. Switch to IV carbo/taxol as patient is platinum sensitive.  We also discussed her taking part of the tesaro trial, which would require BRCA testing. She would like to take part in this trial if eligible.  8/20/14: Remove Intraperitoneal Port ( Port and catheter intact - discarded)  8/28/14: Cycle #5 Taxol/Carbo held due to thrombocytopenia.  6.    She denies any vaginal bleeding, no changes in her bowel or bladder habits, no nausea/emesis, no lower extremity edema, and no difficulties eating or sleeping. She denies any abdominal discomfort/bloating, no fevers or chills, and no chest pain or shortness of breath. She states her diarrhea is the same. She reports some fatigue which improves about 1-2 weeks after her chemotherapy. She states she does not need any medication refills and she was told she does not meet the criteria for the TESARO trial. She states she has 3 bags of iv fluids left over from her previous chemotherapy and will give these to herself. She states she is ready for her treatment today.    9/29/14: Cycle #6 Taxol/Carbo  6. Insurance questions regarding GSF coverage today. No concerns other than fatigue. Taking iron for anemia and does not desire blood transfusion. Using neulasta for neutropenia. Using home IV hydration if needed. Baseline unchanged. No abdominal bloating, constipation, diarrhea, pain, vaginal or rectal bleeding, cough or dyspnea, fluid retention.    10/16/14: Impression:    1. Nodular peritoneal soft tissue mass in the right lower quadrant adjacent to the psoas muscle is no longer appreciated. Adjacent prominent lymphadenopathy is unchanged from previous exam. No new peritoneal lesions.    2. 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    10/20/14:  5. CT chest/abdomen/pelvis on 10/16/14 showed nodular  peritoneal soft tissue mass in the right lower quadrant adjacent to the psoas muscle is no longer appreciated. Adjacent prominent lymphadenopathy is unchanged from previous exam. 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  1/27/15:  6.  4/28/15:  14.  5/26/15:  18.  6/2/15: CT cap Impression:  1. Postsurgical changes of hysterectomy and bilateral salpingo-oophorectomy for ovarian cancer. There is a new 8 mm hazy, ill-defined hypoattenuating lesion in hepatic segment 6 which is suspicious for a metastatic deposit. Further evaluation with ultrasound in recommended.    2. Increased size of a left retroperitoneal lymph node which is indeterminate but may represent a ciro metastasis. Mildly prominent lymph nodes in the right lower quadrant are not significantly changed.  3. Moderate colonic stool burden.    6/4/15: US abdomen IMPRESSION:    Hyperechoic lesion in the right hepatic lobe, consistent with hemangioma. This does not corresponds to the area of the lesion seen on CT from 6/2/2015. An MR would be helpful for identifying and characterizing the lesion from the recent CT.  6/12/15: MR abd IMPRESSION:  1. New 20 x 11 mm enhancing lesions between the right obliques, concerning for metastatic disease. This lesions should be amenable to percutaneous biopsy, if indicated.  2. Correlating to the lesion visualized on comparison CT is a hepatic segment 6 subcapsular 7 mm lesion. Overall the appearance favors the diagnosis of a simple cyst. However, there is faint suggestion of mild peripheral arterial enhancement. Although this is favored as  artifactual, this should be followed up to confirm stability. Recommend 6 month followup.  3. Hepatic segment 6, 5 mm lesion too small to technically characterize. Differential would favor FNH, less likely flash filling hemangioma. Recommend attention on followup.  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     9/1/15: Cycle #1 Avastin/Cytoxan.   31.     9/24/15:feeling generally well. She says she has been having back and stomach spasms. She is taking cytosine daily (she ran out yesterday). She also says its affecting her voice. Admits that it burns occasionally. She is eating and drinking normal. She also admit diarrhea, 5-7 times daily, lose/watery. She trying to stay hydrated and eat fiber. She also says that her body is sore, especially the bottom of her feet. Her blood pressure is normal. She also admits having headache after her first infusion.      9/24/15: Cycle #2 Avastin/Cytoxan.  19.  10/15/15: Cycle #3 Avastin/Cytoxan.  16.     11/6/15: CT c/a/p IMPRESSION:    1. Stable postoperative change of MAR/BSO for ovarian cancer.  2. The lesion in the right flank abdominal musculature is slightly decreased in size. Otherwise, stable examination.  2. No evidence of metastatic disease in the chest.    11/20/15: Treatment planning visit,  16    11/25/15: surgical pathology report  FINAL DIAGNOSIS:  Soft tissue, right oblique muscle mass, excision:  -Recurrent ovarian serous carcinoma  -Carcinoma is present less than 1 mm from one resection margin  -Background skeletal muscle and fibroadipose tissue    1/4/16:  22  1/11/16-1/27/16: Radiation to right flank x 12 treatments  4/7/2016:  94. CT cap IMPRESSION:    In this patient with ovarian cancer status post MAR/BSO and descending/transverse colectomy:  1. No evidence for malignancy in the chest, abdomen, or pelvis.  2. Stable small hypodense segment 6 liver lesion, appears more likely benign, possibly a cyst.  5/13/16:  124.  6/3/16: PET CT IMPRESSION: In this patient with a history of ovarian cancer:  1. Hypermetabolic and enlarging periaortic and perihepatic lymphadenopathy compatible with metastatic  disease, as detailed above.  2. Although hypodense lesion in hepatic segment 6 has been present since 6/2/2015 associated hypermetabolism makes this lesion highly concerning for metastatic disease.    Plan: to start Niraparib under TESARO study.     6/9/16:  137.  6/14/16: Cycle #1 Niraparib.    6/28/16: Cycle #1 D15 Niraparib.    7/5/16:  100.    7/11/16: Cycle #2 Nraparib.   83.    8/3/2016: PET CT IMPRESSION:    In this patient with known history of ovarian cancer:  1) New pleural based nodular opacities in the lateral and inferior aspects of the bilateral lower lobes, worse in the left lung. Likely infection. Close follow up is recommended.      2) Slight decrease in hypermetabolic abdominal lymphadenopathy. 2 hypermetabolic lymph nodes persist.  3) Unchanged right hepatic lobe metastatic lesion.     8/9/16: Cycle #3 Niraparib.  69.  9/6/16: Cycle #4 Niraparib.  53. Dose held due to anemia.  9/13/16: Eval for potential cycle 4 niraparib. Dose held due to anemia.  10/4/16: CT CAP impression:  IMPRESSION: In this patient with a known history of ovarian cancer:  1. There has been interval resolution of pleural-based nodular  opacities which likely represented infection.  2. Abdominal lymphadenopathy in the form of 2 portacaval lymph nodes  have not significantly changed in size, noted to be hypermetabolic on  prior PET/CT.  3. Previously demonstrated metastatic lesion in the right lobe of the  liver is not significantly changed.  10/11/16:  60  11/1/16: C6 niraparib,  75  11/29/16: C7 niraparib.  78. CT CAP impression as follows:  Target lesions (RECIST criteria):       A previously described target lesion superior to the head of the  pancreas (series 2, image 64)  (referred to as a perihepatic node on  6/3/2016) may not be a valid target lesion because it measured less  than 1.5 cm originally. However, this particular node has decreased in  size, now measuring 7 mm  in short axis versus 14 mm on 6/3/2016 when  measured in a similar fashion.       2.3 cm short axis portacaval lymph node on series 2 image 67,  previously 2.0 cm on 10/4/2016.       1.2 cm subtle hypodensity in hepatic segment 6 on series 2 image 75,  stable on multiple studies since at least 6/3/2016     Sum of diameters today: 3.5 cm. Sum of diameters 10/4/2016: 3.2 cm.  Growth = 9%.    12/27/16: C8 niraparib.  105.  1/25/17: C9 niraparib.  108.  2/23/17: C10 niraparib.  132.   CT CAP impression:  Sum of target lesion diameters today: 3.7 cm. Sum of target lesion  diameters on 11/28/2016: 3.5 cm. Growth= 6%  1. In this patient with history of ovarian cancer there is stable  disease by RECIST criteria as evidenced by:   1a. Mildly increased size of liver metastasis.  1b. Stable portacaval lymphadenopathy.  1c. No evidence of metastatic disease in the chest.  2. Trace emphysematous changes of the lungs.  3/22/17: C11 niraparib.  132.  4/19/17: C12 niraparib.  127.  5/16/17: CT CAP IMPRESSION: In this patient with ovarian cancer:  1. Mildly increased size of hepatic metastasis segment 6 with subtle increased capsular retraction.  2. Stable edmundo hepatis nodes, with mild increase in size of periaortic lymph nodes.  3. Prominent left supraclavicular lymph node with subtle increase in size compared to prior studies, particularly comparing to 10/4/2016.  4. Mild subtle groundglass opacities in the right upper lobe, not present on prior study, lesser extent in the right lower lobe.  Findings may represent infection, additional consideration is malignancy (less likely), and attention on follow-up study  Recommended.  Addendum:   Prominent left supraclavicular lymph node (3/25) is stable from most recent CT performed 2/20/2017, currently measuring 14 x 16 mm, previously 14 x 16 mm on 2/20/2017.      The portal caval lymph node/edmundo hepatis lymph node is stable from 2/20/2017, measuring 21  mm.      Clarification of size of the para-aortic lymph node (series 3 image 327). It short axis measurement is 11 mm versus 9 mm on prior study.      Hepatic segment 6 triangular-shaped low density lesion (3/362) measures 14 mm, previously measured 12 mm, demonstrating a  possible/questionable minimal subtle increase in size. Similarly the lymph nodes noted above in the supraclavicular and para-aortic regions demonstrate possible minimal possible subtle increase in size.      5/18/17: Cycle #13 Niraparib.  191.  6/15/17: Cycle #14 Niraparib.  146.  7/13/17: Cycle #15 Niraparib 200 mg.  171     CT (8/9/17):       IMPRESSION:  1. Segment 6 hepatic metastasis is stable to minimally increased in  size.  2. Slight increase in multicentric adenopathy, most pronounced at the  edmundo hepatis. Additional sites include the inferior left  neck/supraclavicular region and retroperitoneum which appear stable to  minimally increased.      8/10/17:  175  9/14/17: MUGA LVEF 54%  9/22/17: C1D1 carboplatin/Doxil.  187.  10/19/17: C2D1 carboplatin/Doxil.  108.  11/17/17: C3D1 carboplatin/Doxil.  82.  12/14/17: C4D1 carboplatin/Doxil/avastin.  83.  Of note, avastin held on C4D14  1/12/18: C5D1 carboplatin/Doxil/avastin.  80.  1/26/18: platelets 61, patient was not given avastin due to being jehova's witness.   2/9/18: C6D1 carboplatin/Doxil/Avastin.   71.  3/2/18:  49. Three month treatment break.  6/20/2018:  170. CT CAP:  IMPRESSION: In this patient with history of ovarian cancer:  1. Increased size of a right hepatic lobe lesion and numerous edmundo  hepatis and retroperitoneal lymph nodes compatible with progression of  metastatic disease.  2. New mild intrahepatic biliary ductal dilatation, periportal edema,  and pericholecystic fluid. Increased soft tissue fullness in the edmundo  hepatis in the expected location of the common hepatic duct. Findings  are  suspicious for developing biliary ductal obstruction secondary to  worsening metastatic disease in the edmundo hepatis. Correlation with  liver function tests is recommended. Right upper quadrant ultrasound  may be beneficial.  3. Unchanged left supraclavicular and right hilar lymphadenopathy in  the chest.     8/3/18: C1D1 weekly paclitaxel.  not done.  9/20/18: C2D1 weekly paclitaxel 80mg/m2. Ca 125-56  11/8/2018: C3D1 weekly paclitaxel;  26    12/17/18: Ct cap  IMPRESSION:  1. New area of lobulated hypodense nodularity at the far-inferior  right liver. This raises the possibility of a new area of hepatic  metastasis.  2. Conversely, other nodules within the right liver are smaller  suggesting improvement.  3. Improved adenopathy at the abdominal retroperitoneum. Improved left  supraclavicular adenopathy. Stable mildly prominent right hilar lymph  nodes.  4. Previously noted ill-defined soft tissue at the edmundo hepatis  region is still present but appears less prominent in size.  5. New finding of segmental wall thickening of the proximal sigmoid  colon with some fluid distention of the adjacent colon. This could  represent a segmental colitis. Other etiologies not excluded.       Date Value Ref Range Status   03/11/2019 147 (H) 0 - 30 U/mL Final     Comment:     Assay Method:  Chemiluminescence using Siemens Centaur XP   01/22/2019 45 (H) 0 - 30 U/mL Final     Comment:     Assay Method:  Chemiluminescence using Siemens Centaur XP   12/20/2018 19 0 - 30 U/mL Final     Comment:     Assay Method:  Chemiluminescence using Siemens Centaur XP   11/08/2018 26 0 - 30 U/mL Final     Comment:     Assay Method:  Chemiluminescence using Siemens Centaur XP   09/20/2018 56 (H) 0 - 30 U/mL Final     Comment:     Assay Method:  Chemiluminescence using Siemens Centaur XP   06/20/2018 170 (H) 0 - 30 U/mL Final     Comment:     Assay Method:  Chemiluminescence using Siemens Centaur XP   03/02/2018 49 (H) 0 - 30 U/mL  Final     Comment:     Assay Method:  Chemiluminescence using Siemens Centaur XP   02/09/2018 71 (H) 0 - 30 U/mL Final     Comment:     Assay Method:  Chemiluminescence using Siemens Centaur XP   01/12/2018 80 (H) 0 - 30 U/mL Final     Comment:     Assay Method:  Chemiluminescence using Siemens Centaur XP   12/12/2017 83 (H) 0 - 30 U/mL Final     Comment:     Assay Method:  Chemiluminescence using Siemens Centaur XP       Past Medical History:   Diagnosis Date     Antiplatelet or antithrombotic long-term use      Ascites      Blood clot in the legs      Diabetes (H)      Ovarian cancer (H)     serous,stg IV     Pleural effusion      Pulmonary embolism (H) 2/2012     Refusal of blood transfusions as patient is [employer]      Short gut syndrome      Subclinical hypothyroidism 4/18/2013     Thrombosis of leg        Past Surgical History:   Procedure Laterality Date     COLECTOMY       COLONOSCOPY  2/1/2012    Procedure:COLONOSCOPY; With Biopsy; Surgeon:TONI [place]; Location:[facility]     HYSTERECTOMY TOTAL ABD, RHIANNA SALPINGO-OOPHORECTOMY, NODE DISSECTION, TUMOR DEBULKING, COMBINED  2/1/2012    Procedure:COMBINED HYSTERECTOMY TOTAL ABDOMINAL, BILATERAL SALPINGO-OOPHORECTOMY, NODE DISSECTION, TUMOR DEBULKING;  Exploratory Laparotomy, Total Abdominal Hysterectomy, Bilateral Salpingo-Oophorectomy, appendectomy,lysis of adhesions, ileal, ascending, transverse and splenic flexure resection, ileal descending bowel renanastomosis, incidental cystotomy repair, CUSA procedure and colonoscopy ; [facility]     INSERT PORT PERITONEAL ACCESS  4/3/2012    Procedure:INSERT PORT PERITONEAL ACCESS; Intraperitoneal Port Placement (c-arm); Surgeon:SAMUEL CARRASCO; Location[facility]     INSERT PORT PERITONEAL ACCESS  5/14/2014    Procedure: INSERT PORT PERITONEAL ACCESS;  Surgeon: Nga Yeung MD;  Location: [facility]     INSERT PORT VASCULAR ACCESS       LAPAROSCOPY DIAGNOSTIC (GYN)  5/14/2014    Procedure: LAPAROSCOPY DIAGNOSTIC  (GYN);  Surgeon: Nga Yeung MD;  [facility]: [facility]     LAPAROTOMY EXPLORATORY Right 11/25/2015    Procedure: LAPAROTOMY EXPLORATORY;  Surgeon: Nga Yeung MD;  Location: [facility]     LAPAROTOMY, TUMOR DEBULKING, COMBINED  5/14/2014    Procedure: COMBINED LAPAROTOMY, TUMOR DEBULKING;  Surgeon: Nga Yeung MD;  Location: [facility]     REMOVE CATHETER PERITONEAL N/A 8/20/2014    Procedure: REMOVE CATHETER PERITONEAL;  Surgeon: Nga Yeung MD;  Location: [facility]     VASCULAR SURGERY      stent left iliac vein       Current Outpatient Medications   Medication     albuterol (PROAIR HFA/PROVENTIL HFA/VENTOLIN HFA) 108 (90 Base) MCG/ACT inhaler     Ascorbic Acid (VITAMIN C PO)     Calcium Carbonate-Vitamin D (CALCIUM + D PO)     cyanocobalamin (VITAMIN B12) 1000 MCG/ML injection     diphenoxylate-atropine (LOMOTIL) 2.5-0.025 MG per tablet     Ferrous Sulfate 324 (65 Fe) MG TBEC     HERBALS     LEVOTHYROXINE SODIUM PO     loperamide (IMODIUM) 2 MG capsule     LORazepam (ATIVAN) 1 MG tablet     magnesium oxide (MAG-OX) 400 MG tablet     omeprazole (PRILOSEC) 20 MG CR capsule     order for DME     potassium chloride (KLOR-CON) 20 MEQ packet     VITAMIN E NATURAL PO     No current facility-administered medications for this visit.      Facility-Administered Medications Ordered in Other Visits   Medication     heparin 100 UNIT/ML injection 500 Units          No Known Allergies    Family History   Problem Relation Age of Onset     Cancer Mother 69        lung, smoker     Cancer Maternal Uncle 65        brain     Colon Cancer Maternal Aunt 80        colon       Social History     Socioeconomic History     Marital status:      Spouse name: Not on file     Number of children: Not on file     Years of education: Not on file     Highest education level: Not on file   Occupational History     Not on file   Social Needs     Financial resource strain: Not on file     Food insecurity:     Worry: Not on file      Inability: Not on file     Transportation needs:     Medical: Not on file     Non-medical: Not on file   Tobacco Use     Smoking status: Former Smoker     Years: 8.00     Types: Cigarettes     Last attempt to quit: 1980     Years since quittin.7     Smokeless tobacco: Never Used     Tobacco comment: started at 11 yo and quit at 16 yo   Substance and Sexual Activity     Alcohol use: Yes     Comment: 3x/day wine or jodi     Drug use: No     Sexual activity: Not on file   Lifestyle     Physical activity:     Days per week: Not on file     Minutes per session: Not on file     Stress: Not on file   Relationships     Social connections:     Talks on phone: Not on file     Gets together: Not on file     Attends [employer] service: Not on file     Active member of club or organization: Not on file     Attends meetings of clubs or organizations: Not on file     Relationship status: Not on file     Intimate partner violence:     Fear of current or ex partner: Not on file     Emotionally abused: Not on file     Physically abused: Not on file     Forced sexual activity: Not on file   Other Topics Concern     Parent/sibling w/ CABG, MI or angioplasty before 65F 55M? Not Asked   Social History Narrative     Not on file       Review of Systems     Constitutional:  Positive for fatigue. Negative for fever, chills, weight loss, weight gain, decreased appetite, night sweats, recent stressors, height gain, height loss, post-operative complications, incisional pain, hallucinations, increased energy, hyperactivity and confused.   HENT:  Negative for ear pain, hearing loss, tinnitus, nosebleeds, trouble swallowing, hoarse voice, mouth sores, sore throat, ear discharge, tooth pain, gum tenderness, taste disturbance, smell disturbance, hearing aid, bleeding gums, dry mouth, sinus pain, sinus congestion and neck mass.    Eyes:  Negative for double vision, pain, redness, eye pain, decreased vision, eye watering, eye bulging,  eye dryness, flashing lights, spots, floaters, strabismus, tunnel vision, jaundice and eye irritation.   Respiratory:   Negative for cough, hemoptysis, sputum production, shortness of breath, wheezing, sleep disturbances due to breathing, snores loudly, respiratory pain, dyspnea on exertion, cough disturbing sleep and postural dyspnea.    Cardiovascular:  Negative for chest pain, dyspnea on exertion, palpitations, orthopnea, claudication, leg swelling, fingers/toes turn blue, hypertension, hypotension, syncope, history of heart murmur, chest pain on exertion, chest pain at rest, pacemaker, few scattered varicosities, leg pain, sleep disturbances due to breathing, tachycardia, light-headedness, exercise intolerance and edema.   Gastrointestinal:  Positive for diarrhea. Negative for heartburn, nausea, vomiting, abdominal pain, constipation, blood in stool, melena, rectal pain, bloating, hemorrhoids, bowel incontinence, jaundice, rectal bleeding, coffee ground emesis and change in stool.   Genitourinary:  Negative for bladder incontinence, dysuria, urgency, hematuria, flank pain, vaginal discharge, difficulty urinating, genital sores, dyspareunia, decreased libido, nocturia, voiding less frequently, arousal difficulty, abnormal vaginal bleeding, excessive menstruation, menstrual changes, hot flashes, vaginal dryness and postmenopausal bleeding.   Musculoskeletal:  Negative for myalgias, back pain, joint swelling, arthralgias, stiffness, muscle cramps, neck pain, bone pain, muscle weakness and fracture.   Skin:  Negative for nail changes, itching, poor wound healing, rash, hair changes, skin changes, acne, warts, poor wound healing, scarring, flaky skin, Raynaud's phenomenon, sensitivity to sunlight and skin thickening.   Neurological:  Positive for tingling. Negative for dizziness, tremors, speech change, seizures, loss of consciousness, weakness, light-headedness, numbness, headaches, disturbances in coordination,  "extremity numbness, memory loss, difficulty walking and paralysis.   Endo/Heme:  Negative for anemia, swollen glands and bruises/bleeds easily.   Psychiatric/Behavioral:  Negative for depression, hallucinations, memory loss, decreased concentration, mood swings and panic attacks.    Breast:  Negative for breast discharge, breast mass, breast pain and nipple retraction.   Endocrine:  Negative for altered temperature regulation, polyphagia, polydipsia, unwanted hair growth and change in facial hair.    I have reviewed and addressed the patient's review of symptoms for today's visit.     Physical Exam:    /85 (BP Location: Right arm, Patient Position: Sitting, Cuff Size: Adult Regular)   Pulse 88   Temp 98.1  F (36.7  C) (Oral)   Resp 18   Ht 1.651 m (5' 5\")   Wt 64.9 kg (143 lb)   SpO2 96%   BMI 23.80 kg/m      General: Alert non-toxic appearing female in no acute distress  HEENT: Normocephalic, atraumatic; PERRLA; no scleral icterus; anicteric sclera; oropharynx pink without lesions; neck supple  Pulmonary: Lungs clear to auscultation, no increased work of breathing noted  Cardiac: Regular rate and rhythm, S1S2, no clicks, murmurs, rubs, or gallops; bilateral lower extremities without edema  GI: Normoactive bowel sounds x4 quadrants, abdomen soft, non-distended, and non-tender to palpation without masses or organomegaly, some scar tissue palpable on R side, around RLQ surgical port site.   : Not indicated  Heme: Palpable supraclavicular lymph node on R side, 2.5 x 2.5 cm.   MSK: Moves all extremities, no obvious muscle wasting  Neuro: No gross deficits, normal gait  Skin: Appropriate color for race, warm and dry, rosacea on cheeks, some redness on neck - from chemo.   Psych: Pleasant and interactive, affect bright, makes appropriate eye contact, thought process linear    Labs:     3/11/19 3/11/19   Hemoglobin 11.7 - 15.7 g/dL 13.9   Hematocrit 35.0 - 47.0 % 42.7   Platelet Count 150 - 450 10e9/L 270 "   Absolute Neutrophil 1.6 - 8.3 10e9/L 3.7   Sodium 133 - 144 mmol/L 136   Potassium 3.4 - 5.3 mmol/L 4.1   Chloride 94 - 109 mmol/L 104   Carbon Dioxide 20 - 32 mmol/L 25   Urea Nitrogen 7 - 30 mg/dL 17   Creatinine 0.52 - 1.04 mg/dL 0.79   Calcium 8.5 - 10.1 mg/dL 9.0   Magnesium 1.6 - 2.3 mg/dL 1.4 (L)   Bilirubin Total 0.2 - 1.3 mg/dL 0.5   ALT 0 - 50 U/L 84 (H)   AST 0 - 45 U/L 61 (H)   Alkaline Phosphatase 40 - 150 U/L 475 (H)   Albumin 3.4 - 5.0 g/dL 3.6   Protein Total 6.8 - 8.8 g/dL 7.9   WBC 4.0 - 11.0 10e9/L 7.6    147    Assessment/Plan:  1) Recurrent stage IIIC bilateral ovarian cancer: will order repeat imaging to assess disease. Discussed treatment options at this time - weekly taxol with or w/o avastin vs. Topotecan vs. niraparib. Patient previously on Parp I and is aware we do not have good data regarding parp after prior parp. Patient ok with plan of starting weekly taxol as tumor marker had gone down nicely with this, will possibly add avastin in future. Chemo RN in to discuss scheduling. Reviewed signs and symptoms for when she should contact the clinic or seek additional care, including but not limited to fever, chills, inability to keep down food or fluids, nausea and vomiting not controlled with antiemetics, and diarrhea leading to dehydration. Patient to contact the clinic with any questions or concerns in the interim.     Risks, benefits, and alternatives to chemotherapy discussed with the patient.    - Mag: chronically low, taking supplement.   - labs/tests ordered: CMP, CBC, Mag, . CT cap. CT neck.     2) Genetic counseling: Testing has been performed and she is negative for mutations in BRCA1, BRCA2, EPCAM, MLH1, MSH2, MSH6, PMS2, PTEN, and TP53 genes    3) Health maintenance issues discussed include to continue following up with PCP for non-gynecologic concerns.    4) Patient verbalized understanding of and agreement with plan    A total of 35 minutes spent with patient, over  50% spent in counseling and coordination of care.    Nga Yeung MD    Department of Ob/Gyn and Women's Health  Division of Gynecologic Oncology  [facility]  [phone]    Patient Care Team:  Aubrie Hester MD as PCP - General  Tammy Flores RN as Continuity Care Coordinator (Oncology)  Nga Yeung MD as MD (Oncology)  Patricia Rocha APRN CNP as Nurse Practitioner (Nurse Practitioner)    I, Jarek Young, am serving as a scribe to document services personally performed by Nga Yeung MD, based upon my observations and the provider's statements to me. All documentation has been reviewed by the aforementioned doctor prior to being entered into the official medical record.     I have reviewed the above note and agree with the scribe's notation as written.

## 2019-03-15 NOTE — PROGRESS NOTES
Care Coordinator Note  Reviewed chemo schedule with patient.  She is comfortable with start on 3/20/19.  Care Coordinator sent message to [facility] scheduling.      Patient verbalized back understanding of the above information discussed.     Theodora Youngblood MSN, RN  Care Coordinator  Gynecologic Cancer   Office:  [phone]  Pager: [phone] #[phone]

## 2019-03-20 NOTE — PROGRESS NOTES
Infusion Nursing Note:  Odalys Nathan presents today for C1D1 Taxol.    Patient seen by provider today: No   present during visit today: Not Applicable.    Note: Has c/o fatigue.  Has had Taxol in the past without reaction.    Intravenous Access:  Lab draw site port, Needle type port, Gauge 20 3/4in.  Labs drawn without difficulty.  Implanted Port.    Treatment Conditions:  Lab Results   Component Value Date    HGB 13.4 03/20/2019     Lab Results   Component Value Date    WBC 6.5 03/20/2019      Lab Results   Component Value Date    ANEU 3.2 03/20/2019     Lab Results   Component Value Date     03/20/2019      Lab Results   Component Value Date     03/20/2019                   Lab Results   Component Value Date    POTASSIUM 3.7 03/20/2019           Lab Results   Component Value Date    MAG 1.5 03/20/2019            Lab Results   Component Value Date    CR 0.71 03/20/2019                   Lab Results   Component Value Date    JOSE ALBERTO 9.4 03/20/2019                Lab Results   Component Value Date    BILITOTAL 0.7 03/20/2019           Lab Results   Component Value Date    ALBUMIN 3.5 03/20/2019                    Lab Results   Component Value Date     03/20/2019           Lab Results   Component Value Date     03/20/2019       Results reviewed, labs MET treatment parameters, ok to proceed with treatment.      Post Infusion Assessment:  Patient tolerated infusion without incident.  Blood return noted pre and post infusion.  Site patent and intact, free from redness, edema or discomfort.  No evidence of extravasations.   Port remains accessed for CT scan.      Discharge Plan:   Discharge instructions reviewed with: Patient.  Patient discharged in stable condition accompanied by: .  Departure Mode: Ambulatory.  Next infusion scheduled for 3/28/19.    ARNOLD GILLETTE RN

## 2019-03-25 NOTE — TELEPHONE ENCOUNTER
patient calling for refill of prilosec for the patient  Last visit with MD 3/14/19  Last order date    omeprazole (PRILOSEC) 20 MG CR capsule 30 capsule 5 8/8/2018  --   Sig - Route: Take 1 capsule (20 mg) by mouth daily - Oral   Sent to pharmacy as: omeprazole (PRILOSEC) 20 MG CR capsule     Please advise on refills for patient

## 2019-04-04 NOTE — PROGRESS NOTES
Infusion Nursing Note:  Odalys Nathan presents today for C1D15 Taxol.    Patient seen by provider today: No   present during visit today: Not Applicable.    Note: Mg 1.2 today.  Replaced per protocol.    Intravenous Access:  Labs drawn without difficulty.  Implanted Port.    Treatment Conditions:  Lab Results   Component Value Date    HGB 12.3 04/04/2019     Lab Results   Component Value Date    WBC 5.3 04/04/2019      Lab Results   Component Value Date    ANEU 2.3 04/04/2019     Lab Results   Component Value Date     04/04/2019      Results reviewed, labs MET treatment parameters, ok to proceed with treatment.      Post Infusion Assessment:  Patient tolerated infusion without incident.  Blood return noted pre and post infusion.  Site patent and intact, free from redness, edema or discomfort.  No evidence of extravasations.  Access discontinued per protocol.       Discharge Plan:   Discharge instructions reviewed with: Patient.  Patient and/or family verbalized understanding of discharge instructions and all questions answered.  AVS to patient via [employer].  Patient will return 4/11/19 for C1D22 Taxol for next appointment.   Patient discharged in stable condition accompanied by: .  Departure Mode: Ambulatory.    Loree Ellis RN

## 2019-04-11 NOTE — PROGRESS NOTES
Infusion Nursing Note:  Odalys Nathan presents today for taxol and magnesium.    Patient seen by provider today: No   present during visit today: Not Applicable.    Note: magnesium replaced per protocol.    Intravenous Access:  Labs drawn without difficulty.  Implanted Port.    Treatment Conditions:  Lab Results   Component Value Date    HGB 12.3 04/11/2019     Lab Results   Component Value Date    WBC 6.0 04/11/2019      Lab Results   Component Value Date    ANEU 2.7 04/11/2019     Lab Results   Component Value Date     04/11/2019      Lab Results   Component Value Date     03/20/2019                   Lab Results   Component Value Date    POTASSIUM 3.7 04/11/2019           Lab Results   Component Value Date    MAG 1.1 04/11/2019            Lab Results   Component Value Date    CR 0.71 03/20/2019                   Lab Results   Component Value Date    JOSE ALBERTO 9.4 03/20/2019                Lab Results   Component Value Date    BILITOTAL 0.7 03/20/2019           Lab Results   Component Value Date    ALBUMIN 3.5 03/20/2019                    Lab Results   Component Value Date     03/20/2019           Lab Results   Component Value Date     03/20/2019       Results reviewed, labs MET treatment parameters, ok to proceed with treatment.      Post Infusion Assessment:  Patient tolerated infusion without incident.  Blood return noted pre and post infusion.  Site patent and intact, free from redness, edema or discomfort.  No evidence of extravasations.  Access discontinued per protocol.       Discharge Plan:   Patient declined prescription refills.  Discharge instructions reviewed with: Patient.  Patient and/or family verbalized understanding of discharge instructions and all questions answered.  Copy of AVS reviewed with patient and/or family.  Patient will return 4/17/19 for next appointment.  Patient discharged in stable condition accompanied by: .  Departure Mode:  Ambulatory.    Payton Flynn RN

## 2019-04-23 NOTE — PROGRESS NOTES
Infusion Nursing Note:  Odalys Nathan presents today for taxol.    Patient seen by provider today: No   present during visit today: Not Applicable.    Note: Magnesium replaced for Mag level 1.2    Intravenous Access:  Lab draw site R chest, Needle type michele, Gauge 20.  Labs drawn without difficulty.  Implanted Port.    Treatment Conditions:  Lab Results   Component Value Date    HGB 11.3 04/23/2019     Lab Results   Component Value Date    WBC 4.5 04/23/2019      Lab Results   Component Value Date    ANEU 1.8 04/23/2019     Lab Results   Component Value Date     04/23/2019      Results reviewed, labs MET treatment parameters, ok to proceed with treatment.      Post Infusion Assessment:  Patient tolerated infusion without incident.  Blood return noted pre and post infusion.  Site patent and intact, free from redness, edema or discomfort.  No evidence of extravasations.  Access discontinued per protocol.       Discharge Plan:   Patient declined prescription refills.  Discharge instructions reviewed with: Patient and Family.  AVS to patient via [employer].  Patient will return 5/7 for next appointment.   Patient discharged in stable condition accompanied by: self and .  Departure Mode: Ambulatory.    Keisha Ferguson RN

## 2019-05-06 NOTE — PROGRESS NOTES
Gynecologic Oncology Follow-Up Note    RE: Odalys Nathan  MRN: [id number]  : 1960  Date of Visit: 2019    CC: Odalys Nathan is a 58 year old year old female with recurrent stage IIIC bilateral ovarian cancer.  She is accompanied by her .  She has been tolerating her weekly taxol overall well.  She does note some slight worsening of her neuropathy in her distal fingers but no real progression in her feet and no difficulty with ADL's due to neuropathy.  She has chronic diarrhea without significant changes while on treatment.  She continues with her Mg replacement.      HPI: Odalys presents to clinic today feeling well.  is increasing, now 147. She reports feeling better for a little bit being off of chemo, but she is starting to feel worse. She reports diarrhea. She has been eating and drinking well. She feels a mass along her R flank. Denies nausea or vomiting. No fever or chills. No chest pain or SOB. She is active and is getting out of bed and partaking in daily activities. Weight has been stable.     Brief Oncology History:  2012 - Admitted to hospital for 2 weeks of intermittent abdominal cramping, distention, diarrhea and N/V. CT of abdomen/pelvis significant for small bowel obstruction, a heterogenous soft tissue density in the pelvis, omental nodules, and ascites. Bilateral adnexal masses per U/S of pelvis. CA-125 was elevated at 987, CEA was normal at 1.0.    12 - Therapeutic paracentesis (4 L) with cytology confirming malignancy (MO positive, weak ER positive, CK-7 positive consistent with GYN primary). Surgery recommended d/t potential for falling blood counts 2/2 chemotherapy (patient is [facility] and limiting blood transfusion)    12 - Exploratory laparotomy, MAR BSO, lysis of adhesion, Appendectomy, Repair cystotomy, Omentectomy Laht-ipdamm-ymsngimtw-transverse-colon resection, Ileo-descending colon anastomosis, CUSA, & Colonoscopy (done by   [facility] and colorectal team).  2/20/2012 - Admitted to hospital for bilateral pulmonary emboli and drainage of pleural effusion. Started on Lovenox.  2/28/12-3/21/12: Cycle #1-2 Carbo/Taxol IV  3/29/12 - Started on Keflex by her PCP for infection in her healing wound (immediately below her umbilicus).    4/11/12-6/14/12: Cycle #3-6 IV chemo, Cycle #1 IV/IP chemo  7/16/12 -  8, CT PRADEEP - enrolled in  - observation arm  3/4/13-6/28/13:  6, 9, 12, 15, 20.  7/1/13: CT Chest, Abdomen, Pelvis IMPRESSION:  1. Worsening metastatic ovarian carcinoma suggested by increased size of soft tissue nodules anterior to the right psoas muscle, that may represent growing mesenteric lymphadenopathy.  2. Remaining prominent right lower quadrant mesenteric lymph nodes are not significantly changed from CT 7/16/2012.  3. Clustered nodular opacities in the right lower lobe are not significant change from 7/16/2012 and remain indeterminate. Again, the appearance and distribution is suggestive of an infectious etiology.  4. Stable 8 mm soft tissue nodule in left breast, unchanged since at least 02/20/2012. This can be observed on followup studies, but correlation with mammography could be considered.  Decision made to start Doxil/Carbo.  7/11/13: The left ventricular ejection fraction is normal at 66.4%.  7/12/13-9/6/13: Cycle #1-3 Doxil/Carbo.  10, 11.    9/30/13 CT C/A/P Impression:  1. Overall, favorable response to treatment with decreasing size of soft tissue nodules tracking along the anterior aspect of the right psoas muscle.  2. 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  3. Improved cluster of right lower lobe pulmonary nodular opacities. These may represent resolving infection.  10/3/13-12/9/13:  9, 8, 10. Cycle 4-6 Doxil/Carbo.    1/13/14 CT C/A/P Impression:   1. Stable appearance of metastatic ovarian cancer. Scattered soft tissue nodules along the anterior aspect of the right psoas muscle are  unchanged in size. Mild mesenteric lymphadenopathy is unchanged.  2. Clustered micronodules in the right lower lobe are unchanged from 9/30/2013, but improved from 7/1/2013. This history suggests a postinflammatory/postinfectious etiology.  3. 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  1/16/14 Discussed multiple options for her based on relatively stable-appearing disease on CT but slight increase in  (which has had small increase to 20 with last recurrence) including chemo break with recheck of  in 1 month, starting new chemo agent immediately, and exploratory surgery with possible resection of nodules. She is considered platinum-sensitive based on > 1 year remission after Taxol/Carbo, which we will take into consideration for future chemo planning. I am not inclined to surgery at this time given difficulty she already has with diarrhea secondary to past colon resection. I suspect we would need to resect further bowel due to mesenteric disease. Also explained inherent risks of any major surgery. Also mentioned maintenance chemo, but this has not been shown to increase overall survival and would likely decrease her quality of life without significant benefit. Family is going on vacation to [place] in 2 weeks and Odalys does not want to have chemo prior to that, so will plan to take 1 month break. She can have  at that time (discussed checking toady since last draw was in early December, but as it would likely not change treatment plan and she has h/o slow rising , will not check today).  2/17/14  16    2/20/14: Decision to take break from chemo for two months, followed by CT and CA-125.    4/21/14  27  4/21/14 CT C/A/P Impression:    1. Increased size of 2 low-attenuation lymph nodes anterior to the right psoas muscle is concerning for worsening metastatic ovarian cancer.    2. New circumferential thickening of a 3.8 cm length segment of distal transverse colon is likely  "physiologic. Recommend attention on followup imaging.    3. Grossly unchanged size of clustered small nodules versus scarring in the right lower lobe the lungs.    4. Stable thrombosis of the right ovarian vein.  5/14/14: Diagnostic laparoscopy converted to exploratory laparotomy and removal of mesenteric masses, tumor debulking, peritoneal biopsies and intraperitoneal port placement. On laparoscopy, it was noted that there were small nodules on the anterior abdominal wall near the previous incision, small nodules on the right pelvic sidewall as well. Nodules were palpated in the mesentery; however, as it was unable to clarify where the origin of the nodules was, the decision was made to open the patient. On opening there was found to be approximately a 3 cm nodule in the small bowel mesentery and another separate approximately 2 cm nodule in the bowel mesentery. Pelvis without evidence of cancer, some mesenteric lymph nodes were palpated. No evidence otherwise of any disseminated cancer throughout the abdomen.    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 (2/2)  -Largest metastasis: 1.3 cm  -See comment  D: Peritoneum, right paracolic gutter #2, biopsy:  -Fibroadipose tissue with granulomatous inflammation surrounding refractile material  -Negative for malignancy   E: Small bowel adhesion, biopsy:  -Fibroconnective tissue, consistent with adhesion  -Negative for malignancy  F: Lymph nodes, mesentry, not otherwise specified, excision:  -Two lymph nodes, negative for metastatic carcinoma (0/2)  G: Lymph node, mesentery, \"#2\", excision:  -One lymph node, negative for " "metastatic carcinoma (0/1)  H: Lymph nodes, mesentery, \"nodule #2\", excision:  -Five lymph nodes, negative for metastatic carcinoma (0/5)  COMMENT:  Some of the specimens show post-operative changes. Others show possible treatment related changes, including necrosis. The metastatic carcinoma in the mesenteric lymph nodes (specimen C) shows variable morphology, including relatively low grade tumor with papillary architecture, and high grade tumor comprised of nests of tumor cells with irregular, slit-like spaces and marked nuclear pleomorphism.    5/29/14: Cycle 1 IV PACLitaxel / IP CISplatin / IP PACLitaxel.  - 28.  6/26/14: Cycle #2 IV/IP.  10  8/5/14: CT chest/abd/pelvis IMPRESSION     1. In this patient with ovarian cancer, overall findings are indicative of stable/slight improvement, as multiple mesenteric lymphadenopathy and scattered nodular peritoneal soft tissue mass lesions appear unchanged or slightly smaller since 4/21/2014.    2. 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 Mild dilatation of the second and the third portion of the duodenum with a narrow SMA angle. This could represent SMA syndrome, if clinically correlated.17/14:    Cycle #3 IV/IP.  10.    CT chest/abd/pelvis with contrast on 8/5/14    Impression:    1. In this patient with ovarian cancer, overall findings are indicative of stable/slight improvement, as multiple mesenteric lymphadenopathy and scattered nodular peritoneal soft tissue mass lesions appear unchanged or slightly smaller since 4/21/2014.    2. 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 Mild dilatation of the second and the third portion of the duodenum with a narrow SMA angle. This could represent SMA syndrome, if clinically correlated.  8/7/14: Cycle #4 Taxol/Carbo (changed from IV/IP).  10. She has been feeling okay. She is unsure if she can finish out the course of 6 cycles IV/IP taxol/cisplatin. She feels like she " has the flu for about a week then starts feeling gradually better after each chemo cycle. Her spouse notes that she actually has been more sick with the treatments than she initially admits here. She was also previously having some rib pain. Denies any rib pain now. Denies any chest pain or shortness of breath.  Plan: discussed recent CT cap results and switching to just IV as she is feeling miserable with IP treatments. Switch to IV carbo/taxol as patient is platinum sensitive.  We also discussed her taking part of the tesaro trial, which would require BRCA testing. She would like to take part in this trial if eligible.  8/20/14: Remove Intraperitoneal Port ( Port and catheter intact - discarded)  8/28/14: Cycle #5 Taxol/Carbo held due to thrombocytopenia.  6.    She denies any vaginal bleeding, no changes in her bowel or bladder habits, no nausea/emesis, no lower extremity edema, and no difficulties eating or sleeping. She denies any abdominal discomfort/bloating, no fevers or chills, and no chest pain or shortness of breath. She states her diarrhea is the same. She reports some fatigue which improves about 1-2 weeks after her chemotherapy. She states she does not need any medication refills and she was told she does not meet the criteria for the TESARO trial. She states she has 3 bags of iv fluids left over from her previous chemotherapy and will give these to herself. She states she is ready for her treatment today.    9/29/14: Cycle #6 Taxol/Carbo  6. Insurance questions regarding GSF coverage today. No concerns other than fatigue. Taking iron for anemia and does not desire blood transfusion. Using neulasta for neutropenia. Using home IV hydration if needed. Baseline unchanged. No abdominal bloating, constipation, diarrhea, pain, vaginal or rectal bleeding, cough or dyspnea, fluid retention.    10/16/14: Impression:    1. Nodular peritoneal soft tissue mass in the right lower quadrant adjacent to  the psoas muscle is no longer appreciated. Adjacent prominent lymphadenopathy is unchanged from previous exam. No new peritoneal lesions.    2. 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    10/20/14:  5. CT chest/abdomen/pelvis on 10/16/14 showed nodular peritoneal soft tissue mass in the right lower quadrant adjacent to the psoas muscle is no longer appreciated. Adjacent prominent lymphadenopathy is unchanged from previous exam. 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  1/27/15:  6.  4/28/15:  14.  5/26/15:  18.  6/2/15: CT cap Impression:  1. Postsurgical changes of hysterectomy and bilateral salpingo-oophorectomy for ovarian cancer. There is a new 8 mm hazy, ill-defined hypoattenuating lesion in hepatic segment 6 which is suspicious for a metastatic deposit. Further evaluation with ultrasound in recommended.    2. Increased size of a left retroperitoneal lymph node which is indeterminate but may represent a ciro metastasis. Mildly prominent lymph nodes in the right lower quadrant are not significantly changed.  3. Moderate colonic stool burden.    6/4/15: US abdomen IMPRESSION:    Hyperechoic lesion in the right hepatic lobe, consistent with hemangioma. This does not corresponds to the area of the lesion seen on CT from 6/2/2015. An MR would be helpful for identifying and characterizing the lesion from the recent CT.  6/12/15: MR abd IMPRESSION:  1. New 20 x 11 mm enhancing lesions between the right obliques, concerning for metastatic disease. This lesions should be amenable to percutaneous biopsy, if indicated.  2. Correlating to the lesion visualized on comparison CT is a hepatic segment 6 subcapsular 7 mm lesion. Overall the appearance favors the diagnosis of a simple cyst. However, there is faint suggestion of mild peripheral arterial enhancement. Although this is favored as  artifactual, this should be followed up to confirm  stability. Recommend 6 month followup.  3. Hepatic segment 6, 5 mm lesion too small to technically characterize. Differential would favor FNH, less likely flash filling hemangioma. Recommend attention on followup.  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     9/1/15: Cycle #1 Avastin/Cytoxan.   31.     9/24/15:feeling generally well. She says she has been having back and stomach spasms. She is taking cytosine daily (she ran out yesterday). She also says its affecting her voice. Admits that it burns occasionally. She is eating and drinking normal. She also admit diarrhea, 5-7 times daily, lose/watery. She trying to stay hydrated and eat fiber. She also says that her body is sore, especially the bottom of her feet. Her blood pressure is normal. She also admits having headache after her first infusion.      9/24/15: Cycle #2 Avastin/Cytoxan.  19.  10/15/15: Cycle #3 Avastin/Cytoxan.  16.     11/6/15: CT c/a/p IMPRESSION:    1. Stable postoperative change of MAR/BSO for ovarian cancer.  2. The lesion in the right flank abdominal musculature is slightly decreased in size. Otherwise, stable examination.  2. No evidence of metastatic disease in the chest.    11/20/15: Treatment planning visit,  16    11/25/15: surgical pathology report  FINAL DIAGNOSIS:  Soft tissue, right oblique muscle mass, excision:  -Recurrent ovarian serous carcinoma  -Carcinoma is present less than 1 mm from one resection margin  -Background skeletal muscle and fibroadipose tissue    1/4/16:  22  1/11/16-1/27/16: Radiation to right flank x 12 treatments  4/7/2016:  94. CT cap IMPRESSION:    In this patient with ovarian cancer status post MAR/BSO and descending/transverse colectomy:  1. No evidence for malignancy in the chest, abdomen, or pelvis.  2. Stable small hypodense segment  6 liver lesion, appears more likely benign, possibly a cyst.  5/13/16:  124.  6/3/16: PET CT IMPRESSION: In this patient with a history of ovarian cancer:  1. Hypermetabolic and enlarging periaortic and perihepatic lymphadenopathy compatible with metastatic disease, as detailed above.  2. Although hypodense lesion in hepatic segment 6 has been present since 6/2/2015 associated hypermetabolism makes this lesion highly concerning for metastatic disease.    Plan: to start Niraparib under TESARO study.     6/9/16:  137.  6/14/16: Cycle #1 Niraparib.    6/28/16: Cycle #1 D15 Niraparib.    7/5/16:  100.    7/11/16: Cycle #2 Nraparib.   83.    8/3/2016: PET CT IMPRESSION:    In this patient with known history of ovarian cancer:  1) New pleural based nodular opacities in the lateral and inferior aspects of the bilateral lower lobes, worse in the left lung. Likely infection. Close follow up is recommended.      2) Slight decrease in hypermetabolic abdominal lymphadenopathy. 2 hypermetabolic lymph nodes persist.  3) Unchanged right hepatic lobe metastatic lesion.     8/9/16: Cycle #3 Niraparib.  69.  9/6/16: Cycle #4 Niraparib.  53. Dose held due to anemia.  9/13/16: Eval for potential cycle 4 niraparib. Dose held due to anemia.  10/4/16: CT CAP impression:  IMPRESSION: In this patient with a known history of ovarian cancer:  1. There has been interval resolution of pleural-based nodular  opacities which likely represented infection.  2. Abdominal lymphadenopathy in the form of 2 portacaval lymph nodes  have not significantly changed in size, noted to be hypermetabolic on  prior PET/CT.  3. Previously demonstrated metastatic lesion in the right lobe of the  liver is not significantly changed.  10/11/16:  60  11/1/16: C6 niraparib,  75  11/29/16: C7 niraparib.  78. CT CAP impression as follows:  Target lesions (RECIST criteria):       A previously described target lesion  superior to the head of the  pancreas (series 2, image 64)  (referred to as a perihepatic node on  6/3/2016) may not be a valid target lesion because it measured less  than 1.5 cm originally. However, this particular node has decreased in  size, now measuring 7 mm in short axis versus 14 mm on 6/3/2016 when  measured in a similar fashion.       2.3 cm short axis portacaval lymph node on series 2 image 67,  previously 2.0 cm on 10/4/2016.       1.2 cm subtle hypodensity in hepatic segment 6 on series 2 image 75,  stable on multiple studies since at least 6/3/2016     Sum of diameters today: 3.5 cm. Sum of diameters 10/4/2016: 3.2 cm.  Growth = 9%.    12/27/16: C8 niraparib.  105.  1/25/17: C9 niraparib.  108.  2/23/17: C10 niraparib.  132.   CT CAP impression:  Sum of target lesion diameters today: 3.7 cm. Sum of target lesion  diameters on 11/28/2016: 3.5 cm. Growth= 6%  1. In this patient with history of ovarian cancer there is stable  disease by RECIST criteria as evidenced by:   1a. Mildly increased size of liver metastasis.  1b. Stable portacaval lymphadenopathy.  1c. No evidence of metastatic disease in the chest.  2. Trace emphysematous changes of the lungs.  3/22/17: C11 niraparib.  132.  4/19/17: C12 niraparib.  127.  5/16/17: CT CAP IMPRESSION: In this patient with ovarian cancer:  1. Mildly increased size of hepatic metastasis segment 6 with subtle increased capsular retraction.  2. Stable edmundo hepatis nodes, with mild increase in size of periaortic lymph nodes.  3. Prominent left supraclavicular lymph node with subtle increase in size compared to prior studies, particularly comparing to 10/4/2016.  4. Mild subtle groundglass opacities in the right upper lobe, not present on prior study, lesser extent in the right lower lobe.  Findings may represent infection, additional consideration is malignancy (less likely), and attention on follow-up study  Recommended.  Addendum:    Prominent left supraclavicular lymph node (3/25) is stable from most recent CT performed 2/20/2017, currently measuring 14 x 16 mm, previously 14 x 16 mm on 2/20/2017.      The portal caval lymph node/edmundo hepatis lymph node is stable from 2/20/2017, measuring 21 mm.      Clarification of size of the para-aortic lymph node (series 3 image 327). It short axis measurement is 11 mm versus 9 mm on prior study.      Hepatic segment 6 triangular-shaped low density lesion (3/362) measures 14 mm, previously measured 12 mm, demonstrating a  possible/questionable minimal subtle increase in size. Similarly the lymph nodes noted above in the supraclavicular and para-aortic regions demonstrate possible minimal possible subtle increase in size.      5/18/17: Cycle #13 Niraparib.  191.  6/15/17: Cycle #14 Niraparib.  146.  7/13/17: Cycle #15 Niraparib 200 mg.  171     CT (8/9/17):       IMPRESSION:  1. Segment 6 hepatic metastasis is stable to minimally increased in  size.  2. Slight increase in multicentric adenopathy, most pronounced at the  edmundo hepatis. Additional sites include the inferior left  neck/supraclavicular region and retroperitoneum which appear stable to  minimally increased.      8/10/17:  175  9/14/17: MUGA LVEF 54%  9/22/17: C1D1 carboplatin/Doxil.  187.  10/19/17: C2D1 carboplatin/Doxil.  108.  11/17/17: C3D1 carboplatin/Doxil.  82.  12/14/17: C4D1 carboplatin/Doxil/avastin.  83.  Of note, avastin held on C4D14  1/12/18: C5D1 carboplatin/Doxil/avastin.  80.  1/26/18: platelets 61, patient was not given avastin due to being jehova's witness.   2/9/18: C6D1 carboplatin/Doxil/Avastin.   71.  3/2/18:  49. Three month treatment break.  6/20/2018:  170. CT CAP:  IMPRESSION: In this patient with history of ovarian cancer:  1. Increased size of a right hepatic lobe lesion and numerous edmundo  hepatis and retroperitoneal lymph nodes compatible  with progression of  metastatic disease.  2. New mild intrahepatic biliary ductal dilatation, periportal edema,  and pericholecystic fluid. Increased soft tissue fullness in the edmundo  hepatis in the expected location of the common hepatic duct. Findings  are suspicious for developing biliary ductal obstruction secondary to  worsening metastatic disease in the edmundo hepatis. Correlation with  liver function tests is recommended. Right upper quadrant ultrasound  may be beneficial.  3. Unchanged left supraclavicular and right hilar lymphadenopathy in  the chest.     8/3/18: C1D1 weekly paclitaxel.  not done.  9/20/18: C2D1 weekly paclitaxel 80mg/m2. Ca 125-56  11/8/2018: C3D1 weekly paclitaxel;  26    12/17/18: Ct cap  IMPRESSION:  1. New area of lobulated hypodense nodularity at the far-inferior  right liver. This raises the possibility of a new area of hepatic  metastasis.  2. Conversely, other nodules within the right liver are smaller  suggesting improvement.  3. Improved adenopathy at the abdominal retroperitoneum. Improved left  supraclavicular adenopathy. Stable mildly prominent right hilar lymph  nodes.  4. Previously noted ill-defined soft tissue at the edmundo hepatis  region is still present but appears less prominent in size.  5. New finding of segmental wall thickening of the proximal sigmoid  colon with some fluid distention of the adjacent colon. This could  represent a segmental colitis. Other etiologies not excluded.       Date Value Ref Range Status   03/20/2019 180 (H) 0 - 30 U/mL Final     Comment:     Assay Method:  Chemiluminescence using Siemens Centaur XP   03/11/2019 147 (H) 0 - 30 U/mL Final     Comment:     Assay Method:  Chemiluminescence using Siemens Centaur XP   01/22/2019 45 (H) 0 - 30 U/mL Final     Comment:     Assay Method:  Chemiluminescence using Siemens Centaur XP   12/20/2018 19 0 - 30 U/mL Final     Comment:     Assay Method:  Chemiluminescence using Siemens  Centaur XP   11/08/2018 26 0 - 30 U/mL Final     Comment:     Assay Method:  Chemiluminescence using Siemens Centaur XP   09/20/2018 56 (H) 0 - 30 U/mL Final     Comment:     Assay Method:  Chemiluminescence using Siemens Centaur XP   06/20/2018 170 (H) 0 - 30 U/mL Final     Comment:     Assay Method:  Chemiluminescence using Siemens Centaur XP   03/02/2018 49 (H) 0 - 30 U/mL Final     Comment:     Assay Method:  Chemiluminescence using Siemens Centaur XP   02/09/2018 71 (H) 0 - 30 U/mL Final     Comment:     Assay Method:  Chemiluminescence using Siemens Centaur XP   01/12/2018 80 (H) 0 - 30 U/mL Final     Comment:     Assay Method:  Chemiluminescence using Siemens Centaur XP       Past Medical History:   Diagnosis Date     Antiplatelet or antithrombotic long-term use      Ascites      Blood clot in the legs      Diabetes (H)      Ovarian cancer (H)     serous,stg IV     Pleural effusion      Pulmonary embolism (H) 2/2012     Refusal of blood transfusions as patient is [employer]      Short gut syndrome      Subclinical hypothyroidism 4/18/2013     Thrombosis of leg        Past Surgical History:   Procedure Laterality Date     COLECTOMY       COLONOSCOPY  2/1/2012    Procedure:COLONOSCOPY; With Biopsy; Surgeon:TONI SULLIVAN; Location:[facility]     HYSTERECTOMY TOTAL ABD, RHIANNA SALPINGO-OOPHORECTOMY, NODE DISSECTION, TUMOR DEBULKING, COMBINED  2/1/2012    Procedure:COMBINED HYSTERECTOMY TOTAL ABDOMINAL, BILATERAL SALPINGO-OOPHORECTOMY, NODE DISSECTION, TUMOR DEBULKING;  Exploratory Laparotomy, Total Abdominal Hysterectomy, Bilateral Salpingo-Oophorectomy, appendectomy,lysis of adhesions, ileal, ascending, transverse and splenic flexure resection, ileal descending bowel renanastomosis, incidental cystotomy repair, CUSA procedure and colonoscopy ; [facility]     INSERT PORT PERITONEAL ACCESS  4/3/2012    Procedure:INSERT PORT PERITONEAL ACCESS; Intraperitoneal Port Placement (c-arm); Surgeon:SAMUEL CARRASCO;  Location:[facility]     INSERT PORT PERITONEAL ACCESS  5/14/2014    Procedure: INSERT PORT PERITONEAL ACCESS;  Surgeon: Nga Yeung MD;  Location: [facility]     INSERT PORT VASCULAR ACCESS       LAPAROSCOPY DIAGNOSTIC (GYN)  5/14/2014    Procedure: LAPAROSCOPY DIAGNOSTIC (GYN);  Surgeon: Nga Yeung MD;  Location: [facility]     LAPAROTOMY EXPLORATORY Right 11/25/2015    Procedure: LAPAROTOMY EXPLORATORY;  Surgeon: Nga Yeung MD;  Location: [facility]     LAPAROTOMY, TUMOR DEBULKING, COMBINED  5/14/2014    Procedure: COMBINED LAPAROTOMY, TUMOR DEBULKING;  Surgeon: Nga Yeung MD;  Location: [facility]     REMOVE CATHETER PERITONEAL N/A 8/20/2014    Procedure: REMOVE CATHETER PERITONEAL;  Surgeon: Nga Yeung MD;  Location: [facility]     VASCULAR SURGERY      stent left iliac vein       Current Outpatient Medications   Medication     albuterol (PROAIR HFA/PROVENTIL HFA/VENTOLIN HFA) 108 (90 Base) MCG/ACT inhaler     Ascorbic Acid (VITAMIN C PO)     Calcium Carbonate-Vitamin D (CALCIUM + D PO)     cyanocobalamin (VITAMIN B12) 1000 MCG/ML injection     diphenoxylate-atropine (LOMOTIL) 2.5-0.025 MG per tablet     Ferrous Sulfate 324 (65 Fe) MG TBEC     HERBALS     iohexol (OMNIPAQUE) 140 MG/ML solution for oral use     LEVOTHYROXINE SODIUM PO     loperamide (IMODIUM) 2 MG capsule     LORazepam (ATIVAN) 1 MG tablet     magnesium oxide (MAG-OX) 400 MG tablet     omeprazole (PRILOSEC) 20 MG DR capsule     order for DME     potassium chloride (KLOR-CON) 20 MEQ packet     VITAMIN E NATURAL PO     No current facility-administered medications for this visit.      Facility-Administered Medications Ordered in Other Visits   Medication     heparin 100 UNIT/ML injection 500 Units          No Known Allergies    Family History   Problem Relation Age of Onset     Cancer Mother 69        lung, smoker     Cancer Maternal Uncle 65        brain     Colon Cancer Maternal Aunt 80        colon       Social History      Socioeconomic History     Marital status:      Spouse name: Not on file     Number of children: Not on file     Years of education: Not on file     Highest education level: Not on file   Occupational History     Not on file   Social Needs     Financial resource strain: Not on file     Food insecurity:     Worry: Not on file     Inability: Not on file     Transportation needs:     Medical: Not on file     Non-medical: Not on file   Tobacco Use     Smoking status: Former Smoker     Years: 8.00     Types: Cigarettes     Last attempt to quit: 1980     Years since quittin.8     Smokeless tobacco: Never Used     Tobacco comment: started at 11 yo and quit at 16 yo   Substance and Sexual Activity     Alcohol use: Yes     Comment: 3x/day wine or jodi     Drug use: No     Sexual activity: Not on file   Lifestyle     Physical activity:     Days per week: Not on file     Minutes per session: Not on file     Stress: Not on file   Relationships     Social connections:     Talks on phone: Not on file     Gets together: Not on file     Attends Voodoo service: Not on file     Active member of club or organization: Not on file     Attends meetings of clubs or organizations: Not on file     Relationship status: Not on file     Intimate partner violence:     Fear of current or ex partner: Not on file     Emotionally abused: Not on file     Physically abused: Not on file     Forced sexual activity: Not on file   Other Topics Concern     Parent/sibling w/ CABG, MI or angioplasty before 65F 55M? Not Asked   Social History Narrative     Not on file       Review of Systems     Constitutional:  Positive for fatigue. Negative for fever, chills, weight loss, weight gain, decreased appetite, night sweats, recent stressors, height gain, height loss, post-operative complications, incisional pain, hallucinations, increased energy, hyperactivity and confused.   HENT:  Negative for ear pain, hearing loss, tinnitus,  nosebleeds, trouble swallowing, hoarse voice, mouth sores, sore throat, ear discharge, tooth pain, gum tenderness, taste disturbance, smell disturbance, hearing aid, bleeding gums, dry mouth, sinus pain, sinus congestion and neck mass.    Eyes:  Negative for double vision, pain, redness, eye pain, decreased vision, eye watering, eye bulging, eye dryness, flashing lights, spots, floaters, strabismus, tunnel vision, jaundice and eye irritation.   Respiratory:   Negative for cough, hemoptysis, sputum production, shortness of breath, wheezing, sleep disturbances due to breathing, snores loudly, respiratory pain, dyspnea on exertion, cough disturbing sleep and postural dyspnea.    Cardiovascular:  Negative for chest pain, dyspnea on exertion, palpitations, orthopnea, claudication, leg swelling, fingers/toes turn blue, hypertension, hypotension, syncope, history of heart murmur, chest pain on exertion, chest pain at rest, pacemaker, few scattered varicosities, leg pain, sleep disturbances due to breathing, tachycardia, light-headedness, exercise intolerance and edema.   Gastrointestinal:  Positive for diarrhea. Negative for heartburn, nausea, vomiting, abdominal pain, constipation, blood in stool, melena, rectal pain, bloating, hemorrhoids, bowel incontinence, jaundice, rectal bleeding, coffee ground emesis and change in stool.   Genitourinary:  Negative for bladder incontinence, dysuria, urgency, hematuria, flank pain, vaginal discharge, difficulty urinating, genital sores, dyspareunia, decreased libido, nocturia, voiding less frequently, arousal difficulty, abnormal vaginal bleeding, excessive menstruation, menstrual changes, hot flashes, vaginal dryness and postmenopausal bleeding.   Musculoskeletal:  Negative for myalgias, back pain, joint swelling, arthralgias, stiffness, muscle cramps, neck pain, bone pain, muscle weakness and fracture.   Skin:  Negative for nail changes, itching, poor wound healing, rash, hair  "changes, skin changes, acne, warts, poor wound healing, scarring, flaky skin, Raynaud's phenomenon, sensitivity to sunlight and skin thickening.   Neurological:  Positive for tingling. Negative for dizziness, tremors, speech change, seizures, loss of consciousness, weakness, light-headedness, numbness, headaches, disturbances in coordination, extremity numbness, memory loss, difficulty walking and paralysis.   Endo/Heme:  Negative for anemia, swollen glands and bruises/bleeds easily.   Psychiatric/Behavioral:  Negative for depression, hallucinations, memory loss, decreased concentration, mood swings and panic attacks.    Breast:  Negative for breast discharge, breast mass, breast pain and nipple retraction.   Endocrine:  Negative for altered temperature regulation, polyphagia, polydipsia, unwanted hair growth and change in facial hair.    I have reviewed and addressed the patient's review of symptoms for today's visit.     Physical Exam:    /73   Pulse 95   Temp 98.7  F (37.1  C) (Oral)   Resp 16   Ht 1.651 m (5' 5\")   Wt 64 kg (141 lb)   SpO2 99%   BMI 23.46 kg/m      General Appearance: healthy and alert, no distress     HEENT:  no thyromegaly, no palpable nodules or masses        Cardiovascular: regular rate and rhythm, no gallops, rubs or murmurs     Respiratory: lungs clear, no rales, rhonchi or wheezes, normal diaphragmatic excursion    Musculoskeletal: extremities non tender and without edema    Skin: no lesions or rashes     Neurological: normal gait, no gross defects     Psychiatric: appropriate mood and affect                               Hematological: normal cervical, supraclavicular and inguinal lymph nodes     Gastrointestinal:       abdomen soft, non-tender, non-distended, no organomegaly or masses    Genitourinary: Deferred    Labs:  WBC 5.5 with ANC 2.4.  Hemoglobin 12.  Platelets 289.  Creatinine 0.76.  Potassium 3.6.  Magnesium 1.4.  Remainder of electrolytes within normal " limits.  AST 33, ALT 51, alkaline phosphatase 454, total bilirubin 0.5.  Albumin 3.2.     Assessment/Plan:  1) Recurrent stage IIIC bilateral ovarian cancer: Patient is tolerated her weekly taxol overall well.  We discussed repeat imaging approximately 3 months after starting her therapy to assess response as long as her  continues to decline.  She would like to transition back to a PARP if possible in the future as she tolerated this well and liked the convenience of the oral administration.  Will plan for CT at the end of June and visit with Dr Yeung at that point for discussion/treatment planning.  Ok to continue with weekly taxol until then.    - Mag: chronically low, taking supplement.     2) Genetic counseling: Testing has been performed and she is negative for mutations in BRCA1, BRCA2, EPCAM, MLH1, MSH2, MSH6, PMS2, PTEN, and TP53 genes    3) Health maintenance issues discussed include to continue following up with PCP for non-gynecologic concerns.    4) Patient verbalized understanding of and agreement with plan    A total of 35 minutes spent with patient, over 50% spent in counseling and coordination of care.    Giuliana Arroyo MD  Gynecologic Oncology  [facility]

## 2019-05-07 NOTE — PROGRESS NOTES
Infusion Nursing Note:  Odalys Nathan presents today for C2D1 Taxol.    Patient seen by provider today: Yes: Dr Arroyo   present during visit today: Not Applicable.    Note: N/A.    Intravenous Access:  Implanted Port- accessed in Fast Track.    Treatment Conditions:  Lab Results   Component Value Date    HGB 12.0 05/07/2019     Lab Results   Component Value Date    WBC 5.5 05/07/2019      Lab Results   Component Value Date    ANEU 2.4 05/07/2019     Lab Results   Component Value Date     05/07/2019      Lab Results   Component Value Date     05/07/2019                   Lab Results   Component Value Date    POTASSIUM 3.6 05/07/2019           Lab Results   Component Value Date    MAG 1.4 05/07/2019            Lab Results   Component Value Date    CR 0.76 05/07/2019                   Lab Results   Component Value Date    JOSE ALBERTO 8.7 05/07/2019                Lab Results   Component Value Date    BILITOTAL 0.5 05/07/2019           Lab Results   Component Value Date    ALBUMIN 3.2 05/07/2019                    Lab Results   Component Value Date    ALT 51 05/07/2019           Lab Results   Component Value Date    AST 33 05/07/2019       Results reviewed, labs MET treatment parameters, ok to proceed with treatment.  Magnesium replaced per protocol.    Post Infusion Assessment:  Patient tolerated infusion without incident.  Blood return noted pre and post infusion.  Site patent and intact, free from redness, edema or discomfort.  No evidence of extravasations.  Access discontinued per protocol.     Discharge Plan:   Discharge instructions reviewed with: Patient.  Patient and/or family verbalized understanding of discharge instructions and all questions answered.  AVS to patient via [employer].  Patient will return 5/16 for next appointment.   Patient discharged in stable condition accompanied by: .  Departure Mode: Ambulatory.    Elly Mcfadden RN

## 2019-05-07 NOTE — LETTER
2019         RE: Odalys Nathan  [id number]  [facility] [id number]        Dear Colleague,    Thank you for referring your patient, Odalys Nathan, to the [facility]. Please see a copy of my visit note below.    Gynecologic Oncology Follow-Up Note    RE: Odalys Nathan  MRN: [id number]  : 1960  Date of Visit: 2019    CC: Odalys Nathan is a 58 year old year old female with recurrent stage IIIC bilateral ovarian cancer.  She is accompanied by her .  She has been tolerating her weekly taxol overall well.  She does note some slight worsening of her neuropathy in her distal fingers but no real progression in her feet and no difficulty with ADL's due to neuropathy.  She has chronic diarrhea without significant changes while on treatment.  She continues with her Mg replacement.      HPI: Odalys presents to clinic today feeling well.  is increasing, now 147. She reports feeling better for a little bit being off of chemo, but she is starting to feel worse. She reports diarrhea. She has been eating and drinking well. She feels a mass along her R flank. Denies nausea or vomiting. No fever or chills. No chest pain or SOB. She is active and is getting out of bed and partaking in daily activities. Weight has been stable.     Brief Oncology History:  2012 - Admitted to hospital for 2 weeks of intermittent abdominal cramping, distention, diarrhea and N/V. CT of abdomen/pelvis significant for small bowel obstruction, a heterogenous soft tissue density in the pelvis, omental nodules, and ascites. Bilateral adnexal masses per U/S of pelvis. CA-125 was elevated at 987, CEA was normal at 1.0.    12 - Therapeutic paracentesis (4 L) with cytology confirming malignancy (AR positive, weak ER positive, CK-7 positive consistent with GYN primary). Surgery recommended d/t potential for falling blood counts 2/2 chemotherapy (patient is Lefty's  witness and limiting blood transfusion)    2/1/12 - Exploratory laparotomy, MAR BSO, lysis of adhesion, Appendectomy, Repair cystotomy, Omentectomy Oikr-ovvufm-uzoycjqey-transverse-colon resection, Ileo-descending colon anastomosis, CUSA, & Colonoscopy (done by Dr. Amato and colorectal team).  2/20/2012 - Admitted to hospital for bilateral pulmonary emboli and drainage of pleural effusion. Started on Lovenox.  2/28/12-3/21/12: Cycle #1-2 Carbo/Taxol IV  3/29/12 - Started on Keflex by her PCP for infection in her healing wound (immediately below her umbilicus).    4/11/12-6/14/12: Cycle #3-6 IV chemo, Cycle #1 IV/IP chemo  7/16/12 -  8, CT PRADEEP - enrolled in  - observation arm  3/4/13-6/28/13:  6, 9, 12, 15, 20.  7/1/13: CT Chest, Abdomen, Pelvis IMPRESSION:  1. Worsening metastatic ovarian carcinoma suggested by increased size of soft tissue nodules anterior to the right psoas muscle, that may represent growing mesenteric lymphadenopathy.  2. Remaining prominent right lower quadrant mesenteric lymph nodes are not significantly changed from CT 7/16/2012.  3. Clustered nodular opacities in the right lower lobe are not significant change from 7/16/2012 and remain indeterminate. Again, the appearance and distribution is suggestive of an infectious etiology.  4. Stable 8 mm soft tissue nodule in left breast, unchanged since at least 02/20/2012. This can be observed on followup studies, but correlation with mammography could be considered.  Decision made to start Doxil/Carbo.  7/11/13: The left ventricular ejection fraction is normal at 66.4%.  7/12/13-9/6/13: Cycle #1-3 Doxil/Carbo.  10, 11.    9/30/13 CT C/A/P Impression:  1. Overall, favorable response to treatment with decreasing size of soft tissue nodules tracking along the anterior aspect of the right psoas muscle.  2. 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  3. Improved cluster of right lower lobe pulmonary nodular opacities. These  may represent resolving infection.  10/3/13-12/9/13:  9, 8, 10. Cycle 4-6 Doxil/Carbo.    1/13/14 CT C/A/P Impression:   1. Stable appearance of metastatic ovarian cancer. Scattered soft tissue nodules along the anterior aspect of the right psoas muscle are unchanged in size. Mild mesenteric lymphadenopathy is unchanged.  2. Clustered micronodules in the right lower lobe are unchanged from 9/30/2013, but improved from 7/1/2013. This history suggests a postinflammatory/postinfectious etiology.  3. 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  1/16/14 Discussed multiple options for her based on relatively stable-appearing disease on CT but slight increase in  (which has had small increase to 20 with last recurrence) including chemo break with recheck of  in 1 month, starting new chemo agent immediately, and exploratory surgery with possible resection of nodules. She is considered platinum-sensitive based on > 1 year remission after Taxol/Carbo, which we will take into consideration for future chemo planning. I am not inclined to surgery at this time given difficulty she already has with diarrhea secondary to past colon resection. I suspect we would need to resect further bowel due to mesenteric disease. Also explained inherent risks of any major surgery. Also mentioned maintenance chemo, but this has not been shown to increase overall survival and would likely decrease her quality of life without significant benefit. Family is going on vacation to [place] in 2 weeks and Odalys does not want to have chemo prior to that, so will plan to take 1 month break. She can have  at that time (discussed checking toady since last draw was in early December, but as it would likely not change treatment plan and she has h/o slow rising , will not check today).  2/17/14  16    2/20/14: Decision to take break from chemo for two months, followed by CT and CA-125.    4/21/14  27  4/21/14 CT  "C/A/P Impression:    1. Increased size of 2 low-attenuation lymph nodes anterior to the right psoas muscle is concerning for worsening metastatic ovarian cancer.    2. New circumferential thickening of a 3.8 cm length segment of distal transverse colon is likely physiologic. Recommend attention on followup imaging.    3. Grossly unchanged size of clustered small nodules versus scarring in the right lower lobe the lungs.    4. Stable thrombosis of the right ovarian vein.  5/14/14: Diagnostic laparoscopy converted to exploratory laparotomy and removal of mesenteric masses, tumor debulking, peritoneal biopsies and intraperitoneal port placement. On laparoscopy, it was noted that there were small nodules on the anterior abdominal wall near the previous incision, small nodules on the right pelvic sidewall as well. Nodules were palpated in the mesentery; however, as it was unable to clarify where the origin of the nodules was, the decision was made to open the patient. On opening there was found to be approximately a 3 cm nodule in the small bowel mesentery and another separate approximately 2 cm nodule in the bowel mesentery. Pelvis without evidence of cancer, some mesenteric lymph nodes were palpated. No evidence otherwise of any disseminated cancer throughout the abdomen.    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 (2/2)  -Largest metastasis: 1.3 cm  -See comment  D: Peritoneum, right paracolic gutter #2, biopsy:  -Fibroadipose tissue with granulomatous inflammation surrounding refractile material  -Negative for malignancy   E: Small bowel adhesion, " "biopsy:  -Fibroconnective tissue, consistent with adhesion  -Negative for malignancy  F: Lymph nodes, mesentry, not otherwise specified, excision:  -Two lymph nodes, negative for metastatic carcinoma (0/2)  G: Lymph node, mesentery, \"#2\", excision:  -One lymph node, negative for metastatic carcinoma (0/1)  H: Lymph nodes, mesentery, \"nodule #2\", excision:  -Five lymph nodes, negative for metastatic carcinoma (0/5)  COMMENT:  Some of the specimens show post-operative changes. Others show possible treatment related changes, including necrosis. The metastatic carcinoma in the mesenteric lymph nodes (specimen C) shows variable morphology, including relatively low grade tumor with papillary architecture, and high grade tumor comprised of nests of tumor cells with irregular, slit-like spaces and marked nuclear pleomorphism.    5/29/14: Cycle 1 IV PACLitaxel / IP CISplatin / IP PACLitaxel.  - 28.  6/26/14: Cycle #2 IV/IP.  10  8/5/14: CT chest/abd/pelvis IMPRESSION     1. In this patient with ovarian cancer, overall findings are indicative of stable/slight improvement, as multiple mesenteric lymphadenopathy and scattered nodular peritoneal soft tissue mass lesions appear unchanged or slightly smaller since 4/21/2014.    2. 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 Mild dilatation of the second and the third portion of the duodenum with a narrow SMA angle. This could represent SMA syndrome, if clinically correlated.17/14:    Cycle #3 IV/IP.  10.    CT chest/abd/pelvis with contrast on 8/5/14    Impression:    1. In this patient with ovarian cancer, overall findings are indicative of stable/slight improvement, as multiple mesenteric lymphadenopathy and scattered nodular peritoneal soft tissue mass lesions appear unchanged or slightly smaller since 4/21/2014.    2. 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 Mild dilatation of the second and the third portion of the " duodenum with a narrow SMA angle. This could represent SMA syndrome, if clinically correlated.  8/7/14: Cycle #4 Taxol/Carbo (changed from IV/IP).  10. She has been feeling okay. She is unsure if she can finish out the course of 6 cycles IV/IP taxol/cisplatin. She feels like she has the flu for about a week then starts feeling gradually better after each chemo cycle. Her spouse notes that she actually has been more sick with the treatments than she initially admits here. She was also previously having some rib pain. Denies any rib pain now. Denies any chest pain or shortness of breath.  Plan: discussed recent CT cap results and switching to just IV as she is feeling miserable with IP treatments. Switch to IV carbo/taxol as patient is platinum sensitive.  We also discussed her taking part of the tesaro trial, which would require BRCA testing. She would like to take part in this trial if eligible.  8/20/14: Remove Intraperitoneal Port ( Port and catheter intact - discarded)  8/28/14: Cycle #5 Taxol/Carbo held due to thrombocytopenia.  6.    She denies any vaginal bleeding, no changes in her bowel or bladder habits, no nausea/emesis, no lower extremity edema, and no difficulties eating or sleeping. She denies any abdominal discomfort/bloating, no fevers or chills, and no chest pain or shortness of breath. She states her diarrhea is the same. She reports some fatigue which improves about 1-2 weeks after her chemotherapy. She states she does not need any medication refills and she was told she does not meet the criteria for the TESARO trial. She states she has 3 bags of iv fluids left over from her previous chemotherapy and will give these to herself. She states she is ready for her treatment today.    9/29/14: Cycle #6 Taxol/Carbo  6. Insurance questions regarding GSF coverage today. No concerns other than fatigue. Taking iron for anemia and does not desire blood transfusion. Using neulasta for  neutropenia. Using home IV hydration if needed. Baseline unchanged. No abdominal bloating, constipation, diarrhea, pain, vaginal or rectal bleeding, cough or dyspnea, fluid retention.    10/16/14: Impression:    1. Nodular peritoneal soft tissue mass in the right lower quadrant adjacent to the psoas muscle is no longer appreciated. Adjacent prominent lymphadenopathy is unchanged from previous exam. No new peritoneal lesions.    2. 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    10/20/14:  5. CT chest/abdomen/pelvis on 10/16/14 showed nodular peritoneal soft tissue mass in the right lower quadrant adjacent to the psoas muscle is no longer appreciated. Adjacent prominent lymphadenopathy is unchanged from previous exam. 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  1/27/15:  6.  4/28/15:  14.  5/26/15:  18.  6/2/15: CT cap Impression:  1. Postsurgical changes of hysterectomy and bilateral salpingo-oophorectomy for ovarian cancer. There is a new 8 mm hazy, ill-defined hypoattenuating lesion in hepatic segment 6 which is suspicious for a metastatic deposit. Further evaluation with ultrasound in recommended.    2. Increased size of a left retroperitoneal lymph node which is indeterminate but may represent a ciro metastasis. Mildly prominent lymph nodes in the right lower quadrant are not significantly changed.  3. Moderate colonic stool burden.    6/4/15: US abdomen IMPRESSION:    Hyperechoic lesion in the right hepatic lobe, consistent with hemangioma. This does not corresponds to the area of the lesion seen on CT from 6/2/2015. An MR would be helpful for identifying and characterizing the lesion from the recent CT.  6/12/15: MR abd IMPRESSION:  1. New 20 x 11 mm enhancing lesions between the right obliques, concerning for metastatic disease. This lesions should be amenable to percutaneous biopsy, if indicated.  2. Correlating to the lesion  visualized on comparison CT is a hepatic segment 6 subcapsular 7 mm lesion. Overall the appearance favors the diagnosis of a simple cyst. However, there is faint suggestion of mild peripheral arterial enhancement. Although this is favored as  artifactual, this should be followed up to confirm stability. Recommend 6 month followup.  3. Hepatic segment 6, 5 mm lesion too small to technically characterize. Differential would favor FNH, less likely flash filling hemangioma. Recommend attention on followup.  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     9/1/15: Cycle #1 Avastin/Cytoxan.   31.     9/24/15:feeling generally well. She says she has been having back and stomach spasms. She is taking cytosine daily (she ran out yesterday). She also says its affecting her voice. Admits that it burns occasionally. She is eating and drinking normal. She also admit diarrhea, 5-7 times daily, lose/watery. She trying to stay hydrated and eat fiber. She also says that her body is sore, especially the bottom of her feet. Her blood pressure is normal. She also admits having headache after her first infusion.      9/24/15: Cycle #2 Avastin/Cytoxan.  19.  10/15/15: Cycle #3 Avastin/Cytoxan.  16.     11/6/15: CT c/a/p IMPRESSION:    1. Stable postoperative change of MAR/BSO for ovarian cancer.  2. The lesion in the right flank abdominal musculature is slightly decreased in size. Otherwise, stable examination.  2. No evidence of metastatic disease in the chest.    11/20/15: Treatment planning visit,  16    11/25/15: surgical pathology report  FINAL DIAGNOSIS:  Soft tissue, right oblique muscle mass, excision:  -Recurrent ovarian serous carcinoma  -Carcinoma is present less than 1 mm from one resection margin  -Background skeletal muscle and fibroadipose tissue    1/4/16:   22  1/11/16-1/27/16: Radiation to right flank x 12 treatments  4/7/2016:  94. CT cap IMPRESSION:    In this patient with ovarian cancer status post MAR/BSO and descending/transverse colectomy:  1. No evidence for malignancy in the chest, abdomen, or pelvis.  2. Stable small hypodense segment 6 liver lesion, appears more likely benign, possibly a cyst.  5/13/16:  124.  6/3/16: PET CT IMPRESSION: In this patient with a history of ovarian cancer:  1. Hypermetabolic and enlarging periaortic and perihepatic lymphadenopathy compatible with metastatic disease, as detailed above.  2. Although hypodense lesion in hepatic segment 6 has been present since 6/2/2015 associated hypermetabolism makes this lesion highly concerning for metastatic disease.    Plan: to start Niraparib under TESARO study.     6/9/16:  137.  6/14/16: Cycle #1 Niraparib.    6/28/16: Cycle #1 D15 Niraparib.    7/5/16:  100.    7/11/16: Cycle #2 Nraparib.   83.    8/3/2016: PET CT IMPRESSION:    In this patient with known history of ovarian cancer:  1) New pleural based nodular opacities in the lateral and inferior aspects of the bilateral lower lobes, worse in the left lung. Likely infection. Close follow up is recommended.      2) Slight decrease in hypermetabolic abdominal lymphadenopathy. 2 hypermetabolic lymph nodes persist.  3) Unchanged right hepatic lobe metastatic lesion.     8/9/16: Cycle #3 Niraparib.  69.  9/6/16: Cycle #4 Niraparib.  53. Dose held due to anemia.  9/13/16: Eval for potential cycle 4 niraparib. Dose held due to anemia.  10/4/16: CT CAP impression:  IMPRESSION: In this patient with a known history of ovarian cancer:  1. There has been interval resolution of pleural-based nodular  opacities which likely represented infection.  2. Abdominal lymphadenopathy in the form of 2 portacaval lymph nodes  have not significantly changed in size, noted to be hypermetabolic on  prior PET/CT.  3.  Previously demonstrated metastatic lesion in the right lobe of the  liver is not significantly changed.  10/11/16:  60  11/1/16: C6 niraparib,  75  11/29/16: C7 niraparib.  78. CT CAP impression as follows:  Target lesions (RECIST criteria):       A previously described target lesion superior to the head of the  pancreas (series 2, image 64)  (referred to as a perihepatic node on  6/3/2016) may not be a valid target lesion because it measured less  than 1.5 cm originally. However, this particular node has decreased in  size, now measuring 7 mm in short axis versus 14 mm on 6/3/2016 when  measured in a similar fashion.       2.3 cm short axis portacaval lymph node on series 2 image 67,  previously 2.0 cm on 10/4/2016.       1.2 cm subtle hypodensity in hepatic segment 6 on series 2 image 75,  stable on multiple studies since at least 6/3/2016     Sum of diameters today: 3.5 cm. Sum of diameters 10/4/2016: 3.2 cm.  Growth = 9%.    12/27/16: C8 niraparib.  105.  1/25/17: C9 niraparib.  108.  2/23/17: C10 niraparib.  132.   CT CAP impression:  Sum of target lesion diameters today: 3.7 cm. Sum of target lesion  diameters on 11/28/2016: 3.5 cm. Growth= 6%  1. In this patient with history of ovarian cancer there is stable  disease by RECIST criteria as evidenced by:   1a. Mildly increased size of liver metastasis.  1b. Stable portacaval lymphadenopathy.  1c. No evidence of metastatic disease in the chest.  2. Trace emphysematous changes of the lungs.  3/22/17: C11 niraparib.  132.  4/19/17: C12 niraparib.  127.  5/16/17: CT CAP IMPRESSION: In this patient with ovarian cancer:  1. Mildly increased size of hepatic metastasis segment 6 with subtle increased capsular retraction.  2. Stable edmundo hepatis nodes, with mild increase in size of periaortic lymph nodes.  3. Prominent left supraclavicular lymph node with subtle increase in size compared to prior studies, particularly  comparing to 10/4/2016.  4. Mild subtle groundglass opacities in the right upper lobe, not present on prior study, lesser extent in the right lower lobe.  Findings may represent infection, additional consideration is malignancy (less likely), and attention on follow-up study  Recommended.  Addendum:   Prominent left supraclavicular lymph node (3/25) is stable from most recent CT performed 2/20/2017, currently measuring 14 x 16 mm, previously 14 x 16 mm on 2/20/2017.      The portal caval lymph node/edmundo hepatis lymph node is stable from 2/20/2017, measuring 21 mm.      Clarification of size of the para-aortic lymph node (series 3 image 327). It short axis measurement is 11 mm versus 9 mm on prior study.      Hepatic segment 6 triangular-shaped low density lesion (3/362) measures 14 mm, previously measured 12 mm, demonstrating a  possible/questionable minimal subtle increase in size. Similarly the lymph nodes noted above in the supraclavicular and para-aortic regions demonstrate possible minimal possible subtle increase in size.      5/18/17: Cycle #13 Niraparib.  191.  6/15/17: Cycle #14 Niraparib.  146.  7/13/17: Cycle #15 Niraparib 200 mg.  171     CT (8/9/17):       IMPRESSION:  1. Segment 6 hepatic metastasis is stable to minimally increased in  size.  2. Slight increase in multicentric adenopathy, most pronounced at the  edmundo hepatis. Additional sites include the inferior left  neck/supraclavicular region and retroperitoneum which appear stable to  minimally increased.      8/10/17:  175  9/14/17: MUGA LVEF 54%  9/22/17: C1D1 carboplatin/Doxil.  187.  10/19/17: C2D1 carboplatin/Doxil.  108.  11/17/17: C3D1 carboplatin/Doxil.  82.  12/14/17: C4D1 carboplatin/Doxil/avastin.  83.  Of note, avastin held on C4D14  1/12/18: C5D1 carboplatin/Doxil/avastin.  80.  1/26/18: platelets 61, patient was not given avastin due to being jehova's witness.   2/9/18: C6D1  carboplatin/Doxil/Avastin.   71.  3/2/18:  49. Three month treatment break.  6/20/2018:  170. CT CAP:  IMPRESSION: In this patient with history of ovarian cancer:  1. Increased size of a right hepatic lobe lesion and numerous edmundo  hepatis and retroperitoneal lymph nodes compatible with progression of  metastatic disease.  2. New mild intrahepatic biliary ductal dilatation, periportal edema,  and pericholecystic fluid. Increased soft tissue fullness in the edmundo  hepatis in the expected location of the common hepatic duct. Findings  are suspicious for developing biliary ductal obstruction secondary to  worsening metastatic disease in the edmundo hepatis. Correlation with  liver function tests is recommended. Right upper quadrant ultrasound  may be beneficial.  3. Unchanged left supraclavicular and right hilar lymphadenopathy in  the chest.     8/3/18: C1D1 weekly paclitaxel.  not done.  9/20/18: C2D1 weekly paclitaxel 80mg/m2. Ca 125-56  11/8/2018: C3D1 weekly paclitaxel;  26    12/17/18: Ct cap  IMPRESSION:  1. New area of lobulated hypodense nodularity at the far-inferior  right liver. This raises the possibility of a new area of hepatic  metastasis.  2. Conversely, other nodules within the right liver are smaller  suggesting improvement.  3. Improved adenopathy at the abdominal retroperitoneum. Improved left  supraclavicular adenopathy. Stable mildly prominent right hilar lymph  nodes.  4. Previously noted ill-defined soft tissue at the edmundo hepatis  region is still present but appears less prominent in size.  5. New finding of segmental wall thickening of the proximal sigmoid  colon with some fluid distention of the adjacent colon. This could  represent a segmental colitis. Other etiologies not excluded.       Date Value Ref Range Status   03/20/2019 180 (H) 0 - 30 U/mL Final     Comment:     Assay Method:  Chemiluminescence using Siemens Centaur XP   03/11/2019 147 (H) 0 - 30  U/mL Final     Comment:     Assay Method:  Chemiluminescence using Siemens Centaur XP   01/22/2019 45 (H) 0 - 30 U/mL Final     Comment:     Assay Method:  Chemiluminescence using Siemens Centaur XP   12/20/2018 19 0 - 30 U/mL Final     Comment:     Assay Method:  Chemiluminescence using Siemens Centaur XP   11/08/2018 26 0 - 30 U/mL Final     Comment:     Assay Method:  Chemiluminescence using Siemens Centaur XP   09/20/2018 56 (H) 0 - 30 U/mL Final     Comment:     Assay Method:  Chemiluminescence using Siemens Centaur XP   06/20/2018 170 (H) 0 - 30 U/mL Final     Comment:     Assay Method:  Chemiluminescence using Siemens Centaur XP   03/02/2018 49 (H) 0 - 30 U/mL Final     Comment:     Assay Method:  Chemiluminescence using Siemens Centaur XP   02/09/2018 71 (H) 0 - 30 U/mL Final     Comment:     Assay Method:  Chemiluminescence using Siemens Centaur XP   01/12/2018 80 (H) 0 - 30 U/mL Final     Comment:     Assay Method:  Chemiluminescence using [employer]       Past Medical History:   Diagnosis Date     Antiplatelet or antithrombotic long-term use      Ascites      Blood clot in the legs      Diabetes (H)      Ovarian cancer (H)     serous,stg IV     Pleural effusion      Pulmonary embolism (H) 2/2012     Refusal of blood transfusions as patient is [employer]      Short gut syndrome      Subclinical hypothyroidism 4/18/2013     Thrombosis of leg        Past Surgical History:   Procedure Laterality Date     COLECTOMY       COLONOSCOPY  2/1/2012    Procedure:COLONOSCOPY; With Biopsy; Surgeon:TONI SULLIVAN; Location:[facility]     HYSTERECTOMY TOTAL ABD, RHIANNA SALPINGO-OOPHORECTOMY, NODE DISSECTION, TUMOR DEBULKING, COMBINED  2/1/2012    Procedure:COMBINED HYSTERECTOMY TOTAL ABDOMINAL, BILATERAL SALPINGO-OOPHORECTOMY, NODE DISSECTION, TUMOR DEBULKING;  Exploratory Laparotomy, Total Abdominal Hysterectomy, Bilateral Salpingo-Oophorectomy, appendectomy,lysis of adhesions, ileal, ascending, transverse and  splenic flexure resection, ileal descending bowel renanastomosis, incidental cystotomy repair, CUSA procedure and colonoscopy ; [facility]     INSERT PORT PERITONEAL ACCESS  4/3/2012    Procedure:INSERT PORT PERITONEAL ACCESS; Intraperitoneal Port Placement (c-arm); Surgeon:SAMUEL CARRASCO; Location:[facility]     INSERT PORT PERITONEAL ACCESS  5/14/2014    Procedure: INSERT PORT PERITONEAL ACCESS;  Surgeon: Nga Yeung MD;  Location: [facility]     INSERT PORT VASCULAR ACCESS       LAPAROSCOPY DIAGNOSTIC (GYN)  5/14/2014    Procedure: LAPAROSCOPY DIAGNOSTIC (GYN);  Surgeon: Nga Yeung MD;  Location: [facility]     LAPAROTOMY EXPLORATORY Right 11/25/2015    Procedure: LAPAROTOMY EXPLORATORY;  Surgeon: Nga Yeung MD;  Location: [facility]     LAPAROTOMY, TUMOR DEBULKING, COMBINED  5/14/2014    Procedure: COMBINED LAPAROTOMY, TUMOR DEBULKING;  Surgeon: Nga Yeung MD;  Location: [facility]     REMOVE CATHETER PERITONEAL N/A 8/20/2014    Procedure: REMOVE CATHETER PERITONEAL;  Surgeon: Nga Yeung MD;  Location: [facility]     VASCULAR SURGERY      stent left iliac vein       Current Outpatient Medications   Medication     albuterol (PROAIR HFA/PROVENTIL HFA/VENTOLIN HFA) 108 (90 Base) MCG/ACT inhaler     Ascorbic Acid (VITAMIN C PO)     Calcium Carbonate-Vitamin D (CALCIUM + D PO)     cyanocobalamin (VITAMIN B12) 1000 MCG/ML injection     diphenoxylate-atropine (LOMOTIL) 2.5-0.025 MG per tablet     Ferrous Sulfate 324 (65 Fe) MG TBEC     HERBALS     iohexol (OMNIPAQUE) 140 MG/ML solution for oral use     LEVOTHYROXINE SODIUM PO     loperamide (IMODIUM) 2 MG capsule     LORazepam (ATIVAN) 1 MG tablet     magnesium oxide (MAG-OX) 400 MG tablet     omeprazole (PRILOSEC) 20 MG DR capsule     order for DME     potassium chloride (KLOR-CON) 20 MEQ packet     VITAMIN E NATURAL PO     No current facility-administered medications for this visit.      Facility-Administered Medications Ordered in Other Visits    Medication     heparin 100 UNIT/ML injection 500 Units          No Known Allergies    Family History   Problem Relation Age of Onset     Cancer Mother 69        lung, smoker     Cancer Maternal Uncle 65        brain     Colon Cancer Maternal Aunt 80        colon       Social History     Socioeconomic History     Marital status:      Spouse name: Not on file     Number of children: Not on file     Years of education: Not on file     Highest education level: Not on file   Occupational History     Not on file   Social Needs     Financial resource strain: Not on file     Food insecurity:     Worry: Not on file     Inability: Not on file     Transportation needs:     Medical: Not on file     Non-medical: Not on file   Tobacco Use     Smoking status: Former Smoker     Years: 8.00     Types: Cigarettes     Last attempt to quit: 1980     Years since quittin.8     Smokeless tobacco: Never Used     Tobacco comment: started at 9 yo and quit at 16 yo   Substance and Sexual Activity     Alcohol use: Yes     Comment: 3x/day wine or jodi     Drug use: No     Sexual activity: Not on file   Lifestyle     Physical activity:     Days per week: Not on file     Minutes per session: Not on file     Stress: Not on file   Relationships     Social connections:     Talks on phone: Not on file     Gets together: Not on file     Attends [employer] service: Not on file     Active member of club or organization: Not on file     Attends meetings of clubs or organizations: Not on file     Relationship status: Not on file     Intimate partner violence:     Fear of current or ex partner: Not on file     Emotionally abused: Not on file     Physically abused: Not on file     Forced sexual activity: Not on file   Other Topics Concern     Parent/sibling w/ CABG, MI or angioplasty before 65F 55M? Not Asked   Social History Narrative     Not on file       Review of Systems     Constitutional:  Positive for fatigue. Negative for fever,  chills, weight loss, weight gain, decreased appetite, night sweats, recent stressors, height gain, height loss, post-operative complications, incisional pain, hallucinations, increased energy, hyperactivity and confused.   HENT:  Negative for ear pain, hearing loss, tinnitus, nosebleeds, trouble swallowing, hoarse voice, mouth sores, sore throat, ear discharge, tooth pain, gum tenderness, taste disturbance, smell disturbance, hearing aid, bleeding gums, dry mouth, sinus pain, sinus congestion and neck mass.    Eyes:  Negative for double vision, pain, redness, eye pain, decreased vision, eye watering, eye bulging, eye dryness, flashing lights, spots, floaters, strabismus, tunnel vision, jaundice and eye irritation.   Respiratory:   Negative for cough, hemoptysis, sputum production, shortness of breath, wheezing, sleep disturbances due to breathing, snores loudly, respiratory pain, dyspnea on exertion, cough disturbing sleep and postural dyspnea.    Cardiovascular:  Negative for chest pain, dyspnea on exertion, palpitations, orthopnea, claudication, leg swelling, fingers/toes turn blue, hypertension, hypotension, syncope, history of heart murmur, chest pain on exertion, chest pain at rest, pacemaker, few scattered varicosities, leg pain, sleep disturbances due to breathing, tachycardia, light-headedness, exercise intolerance and edema.   Gastrointestinal:  Positive for diarrhea. Negative for heartburn, nausea, vomiting, abdominal pain, constipation, blood in stool, melena, rectal pain, bloating, hemorrhoids, bowel incontinence, jaundice, rectal bleeding, coffee ground emesis and change in stool.   Genitourinary:  Negative for bladder incontinence, dysuria, urgency, hematuria, flank pain, vaginal discharge, difficulty urinating, genital sores, dyspareunia, decreased libido, nocturia, voiding less frequently, arousal difficulty, abnormal vaginal bleeding, excessive menstruation, menstrual changes, hot flashes, vaginal  "dryness and postmenopausal bleeding.   Musculoskeletal:  Negative for myalgias, back pain, joint swelling, arthralgias, stiffness, muscle cramps, neck pain, bone pain, muscle weakness and fracture.   Skin:  Negative for nail changes, itching, poor wound healing, rash, hair changes, skin changes, acne, warts, poor wound healing, scarring, flaky skin, Raynaud's phenomenon, sensitivity to sunlight and skin thickening.   Neurological:  Positive for tingling. Negative for dizziness, tremors, speech change, seizures, loss of consciousness, weakness, light-headedness, numbness, headaches, disturbances in coordination, extremity numbness, memory loss, difficulty walking and paralysis.   Endo/Heme:  Negative for anemia, swollen glands and bruises/bleeds easily.   Psychiatric/Behavioral:  Negative for depression, hallucinations, memory loss, decreased concentration, mood swings and panic attacks.    Breast:  Negative for breast discharge, breast mass, breast pain and nipple retraction.   Endocrine:  Negative for altered temperature regulation, polyphagia, polydipsia, unwanted hair growth and change in facial hair.    I have reviewed and addressed the patient's review of symptoms for today's visit.     Physical Exam:    /73   Pulse 95   Temp 98.7  F (37.1  C) (Oral)   Resp 16   Ht 1.651 m (5' 5\")   Wt 64 kg (141 lb)   SpO2 99%   BMI 23.46 kg/m       General Appearance: healthy and alert, no distress     HEENT:  no thyromegaly, no palpable nodules or masses        Cardiovascular: regular rate and rhythm, no gallops, rubs or murmurs     Respiratory: lungs clear, no rales, rhonchi or wheezes, normal diaphragmatic excursion    Musculoskeletal: extremities non tender and without edema    Skin: no lesions or rashes     Neurological: normal gait, no gross defects     Psychiatric: appropriate mood and affect                               Hematological: normal cervical, supraclavicular and inguinal lymph " nodes     Gastrointestinal:       abdomen soft, non-tender, non-distended, no organomegaly or masses    Genitourinary: Deferred    Labs:  WBC 5.5 with ANC 2.4.  Hemoglobin 12.  Platelets 289.  Creatinine 0.76.  Potassium 3.6.  Magnesium 1.4.  Remainder of electrolytes within normal limits.  AST 33, ALT 51, alkaline phosphatase 454, total bilirubin 0.5.  Albumin 3.2.     Assessment/Plan:  1) Recurrent stage IIIC bilateral ovarian cancer: Patient is tolerated her weekly taxol overall well.  We discussed repeat imaging approximately 3 months after starting her therapy to assess response as long as her  continues to decline.  She would like to transition back to a PARP if possible in the future as she tolerated this well and liked the convenience of the oral administration.  Will plan for CT at the end of June and visit with Dr Yeung at that point for discussion/treatment planning.  Ok to continue with weekly taxol until then.    - Mag: chronically low, taking supplement.     2) Genetic counseling: Testing has been performed and she is negative for mutations in BRCA1, BRCA2, EPCAM, MLH1, MSH2, MSH6, PMS2, PTEN, and TP53 genes    3) Health maintenance issues discussed include to continue following up with PCP for non-gynecologic concerns.    4) Patient verbalized understanding of and agreement with plan    A total of 35 minutes spent with patient, over 50% spent in counseling and coordination of care.    Giuliana Arroyo MD  Gynecologic Oncology  [facility]              Again, thank you for allowing me to participate in the care of your patient.        Sincerely,        Festus Arroyo MD

## 2019-05-07 NOTE — PROGRESS NOTES
Nursing Note:  Odalys Nathan presents today for port labs.    Patient seen by provider today: Yes: Dr. Arroyo   present during visit today: Not Applicable.    Note: N/A.    Intravenous Access:  Lab draw site right port, Needle type Méndez, Gauge 20.  Labs drawn without difficulty.  Implanted Port.    Discharge Plan:   [facility]    Justine Fitch RN

## 2019-05-07 NOTE — NURSING NOTE
"Oncology Rooming Note    May 7, 2019 9:56 AM   Odalys Nathan is a 58 year old female who presents for:    Chief Complaint   Patient presents with     Oncology Clinic Visit     Ovarian cancer, right and left, metastatic cancer     Initial Vitals: /73   Pulse 95   Temp 98.7  F (37.1  C) (Oral)   Resp 16   Ht 1.651 m (5' 5\")   Wt 64 kg (141 lb)   SpO2 99%   BMI 23.46 kg/m   Estimated body mass index is 23.46 kg/m  as calculated from the following:    Height as of this encounter: 1.651 m (5' 5\").    Weight as of this encounter: 64 kg (141 lb). Body surface area is 1.71 meters squared.  No Pain (0) Comment: Data Unavailable   No LMP recorded. Patient has had a hysterectomy.  Allergies reviewed: Yes  Medications reviewed: Yes    Medications: Medication refills not needed today.  Pharmacy name entered into Geo[employer]:    [id number] - [place], [place] - [address]  [place], [place] - [address] [place], [place] - [address] [facility]    Clinical concerns: f/u       Tiffani Michelle CMA              "

## 2019-05-16 NOTE — PROGRESS NOTES
Infusion Nursing Note:  Odalys Nathan presents today for C2D8 taxol and IV magnesium.    Patient seen by provider today: No   present during visit today: Not Applicable.    Note: Mag 1.3, replaced with 2gm.    Intravenous Access:  Labs drawn without difficulty.  Implanted Port.    Treatment Conditions:  Lab Results   Component Value Date    HGB 11.7 05/16/2019     Lab Results   Component Value Date    WBC 7.4 05/16/2019      Lab Results   Component Value Date    ANEU 4.0 05/16/2019     Lab Results   Component Value Date     05/16/2019      Lab Results   Component Value Date     05/07/2019                   Lab Results   Component Value Date    POTASSIUM 3.6 05/16/2019           Lab Results   Component Value Date    MAG 1.3 05/16/2019            Lab Results   Component Value Date    CR 0.76 05/07/2019                   Lab Results   Component Value Date    JOSE ALBERTO 8.7 05/07/2019                Lab Results   Component Value Date    BILITOTAL 0.5 05/07/2019           Lab Results   Component Value Date    ALBUMIN 3.2 05/07/2019                    Lab Results   Component Value Date    ALT 51 05/07/2019           Lab Results   Component Value Date    AST 33 05/07/2019       Results reviewed, labs MET treatment parameters, ok to proceed with treatment.      Post Infusion Assessment:  Patient tolerated infusion without incident.  Blood return noted pre and post infusion.  Site patent and intact, free from redness, edema or discomfort.  No evidence of extravasations.  Access discontinued per protocol.       Discharge Plan:   AVS to patient via MYCHART.  Patient will return 5/23/19 for next appointment.   Patient discharged in stable condition accompanied by: .  Departure Mode: Ambulatory.    Sonia Pham RN

## 2019-05-23 NOTE — PROGRESS NOTES
Infusion Nursing Note:  Odalys Nathan presents today for Taxol/K+/Mag.    Patient seen by provider today: No   present during visit today: Not Applicable.    Note: Denies change in symtpoms. Potassium 3.3 today, replaced with 40meq orally.  Mag 1.3 today, replaced with 2G Mag.    Intravenous Access:  Lab draw site port, Needle type port, Gauge 20 3/4in.  Labs drawn without difficulty.  Implanted Port.    Treatment Conditions:  Lab Results   Component Value Date    HGB 11.1 05/23/2019     Lab Results   Component Value Date    WBC 5.1 05/23/2019      Lab Results   Component Value Date    ANEU 2.4 05/23/2019     Lab Results   Component Value Date     05/23/2019      Results reviewed, labs MET treatment parameters, ok to proceed with treatment.      Post Infusion Assessment:  Patient tolerated infusion without incident.  Blood return noted pre and post infusion.  Site patent and intact, free from redness, edema or discomfort.  No evidence of extravasations.  Access discontinued per protocol.       Discharge Plan:   Discharge instructions reviewed with: Patient.  Patient discharged in stable condition accompanied by: sister.  Departure Mode: Ambulatory.  Next treatment scheduled for 5/30/19.    ARNOLD GILLETTE RN

## 2019-05-30 NOTE — PROGRESS NOTES
Infusion Nursing Note:  Odalys Nathan presents today for C2D22 Taxol and Mg replacement    Patient seen by provider today: No   present during visit today: Not Applicable.    Note: N/A.    Intravenous Access:  Labs drawn without difficulty.  Implanted Port.    Treatment Conditions:  Lab Results   Component Value Date    HGB 11.2 05/30/2019     Lab Results   Component Value Date    WBC 5.1 05/30/2019      Lab Results   Component Value Date    ANEU 2.9 05/30/2019     Lab Results   Component Value Date     05/30/2019      Results reviewed, labs MET treatment parameters, ok to proceed with treatment.    K 3.6  Mg 1.3--replaced per protocol.      Post Infusion Assessment:  Patient tolerated infusion without incident.  Blood return noted pre and post infusion.  Site patent and intact, free from redness, edema or discomfort.  No evidence of extravasations.  Access discontinued per protocol.       Discharge Plan:   Discharge instructions reviewed with: Patient.  Patient and/or family verbalized understanding of discharge instructions and all questions answered.  AVS to patient via Music Messenger (MM)T.  Patient will return 6/6/19 for labs and C2D29 Taxol for next appointment.   Patient discharged in stable condition accompanied by: self.  Departure Mode: Ambulatory.    Loree Ellis RN

## 2019-06-06 NOTE — PROGRESS NOTES
Infusion Nursing Note:  Odalys Nathan presents today for C2D29 Taxol, potassium and mag replacement    Patient seen by provider today: No   present during visit today: Not Applicable.    Note: Pt states she is feeling well today, denies complaints.    Intravenous Access:  Labs drawn without difficulty.  Implanted Port.    Treatment Conditions:  Lab Results   Component Value Date    HGB 11.0 06/06/2019     Lab Results   Component Value Date    WBC 4.3 06/06/2019      Lab Results   Component Value Date    ANEU 2.0 06/06/2019     Lab Results   Component Value Date     06/06/2019      Lab Results   Component Value Date     05/07/2019                   Lab Results   Component Value Date    POTASSIUM 3.1 06/06/2019           Lab Results   Component Value Date    MAG 1.2 06/06/2019            Lab Results   Component Value Date    CR 0.76 05/07/2019                   Lab Results   Component Value Date    JOSE ALBERTO 8.7 05/07/2019                Lab Results   Component Value Date    BILITOTAL 0.5 05/07/2019           Lab Results   Component Value Date    ALBUMIN 3.2 05/07/2019                    Lab Results   Component Value Date    ALT 51 05/07/2019           Lab Results   Component Value Date    AST 33 05/07/2019       Results reviewed, labs MET treatment parameters, ok to proceed with treatment.      Post Infusion Assessment:  Patient tolerated infusion without incident.  Blood return noted pre and post infusion.  Site patent and intact, free from redness, edema or discomfort.  No evidence of extravasations.  Access discontinued per protocol.       Discharge Plan:   Discharge instructions reviewed with: Patient.  Patient and/or family verbalized understanding of discharge instructions and all questions answered.  Copy of AVS reviewed with patient and/or family.  Patient will return 6/13/19 for next appointment.  Patient discharged in stable condition accompanied by: self and .  Departure  Mode: Ambulatory.    Yesenia Choudhary RN

## 2019-06-13 NOTE — PROGRESS NOTES
Infusion Nursing Note:  Odalys Nathan presents today for C2D36 Taxol and Mag replacement   Patient seen by provider today: No   present during visit today: Not Applicable.    Note: NA    Intravenous Access:  Labs drawn without difficulty.  Implanted Port.    Treatment Conditions:  Lab Results   Component Value Date    HGB 10.9 06/13/2019     Lab Results   Component Value Date    WBC 4.2 06/13/2019      Lab Results   Component Value Date    ANEU 1.9 06/13/2019     Lab Results   Component Value Date     06/13/2019      Lab Results   Component Value Date     05/07/2019                   Lab Results   Component Value Date    POTASSIUM 3.6 06/13/2019           Lab Results   Component Value Date    MAG 1.1 06/13/2019            Lab Results   Component Value Date    CR 0.76 05/07/2019                   Lab Results   Component Value Date    JOSE ALBERTO 8.7 05/07/2019                Lab Results   Component Value Date    BILITOTAL 0.5 05/07/2019           Lab Results   Component Value Date    ALBUMIN 3.2 05/07/2019                    Lab Results   Component Value Date    ALT 51 05/07/2019           Lab Results   Component Value Date    AST 33 05/07/2019       Results reviewed, labs MET treatment parameters, ok to proceed with treatment.  Mg replaced per protocol.      Post Infusion Assessment:  Patient tolerated infusion without incident.  Blood return noted pre and post infusion.  Site patent and intact, free from redness, edema or discomfort.  No evidence of extravasations.  Access discontinued per protocol.       Discharge Plan:   Discharge instructions reviewed with: Patient and .  Patient and/or family verbalized understanding of discharge instructions and all questions answered.  Copy of AVS reviewed with patient and/or family.  Patient will return 6/20/19 for C3D1 Taxol for next appointment.  Patient discharged in stable condition accompanied by: .  Departure Mode:  Ambulatory.    Loree Ellis RN

## 2019-06-20 NOTE — LETTER
2019         RE: Odalys Nathan  [address]  [facility] [id number]        Dear Colleague,    Thank you for referring your patient, Odalys Nathan, to the [facility]. Please see a copy of my visit note below.                Follow Up Notes on Referred Patient    Date: 2019        RE: Odalys Nathan  : 1960  HOLA: 2019      Odalys Nathan is a 58 year old woman with a diagnosis of recurrent stage IIIC bilateral ovarian cancer. She is here today for follow up and disease management.     Oncology History:  2012 - Admitted to hospital for 2 weeks of intermittent abdominal cramping, distention, diarrhea and N/V. CT of abdomen/pelvis significant for small bowel obstruction, a heterogenous soft tissue density in the pelvis, omental nodules, and ascites. Bilateral adnexal masses per U/S of pelvis. CA-125 was elevated at 987, CEA was normal at 1.0.    12 - Therapeutic paracentesis (4 L) with cytology confirming malignancy (OR positive, weak ER positive, CK-7 positive consistent with GYN primary). Surgery recommended d/t potential for falling blood counts 2/ chemotherapy (patient is Islam and limiting blood transfusion)    12 - Exploratory laparotomy, MAR BSO, lysis of adhesion, Appendectomy, Repair cystotomy, Omentectomy Essz-lnazxc-luewotcyg-transverse-colon resection, Ileo-descending colon anastomosis, CUSA, & Colonoscopy (done by Dr. Amato and colorectal team).  2012 - Admitted to hospital for bilateral pulmonary emboli and drainage of pleural effusion. Started on Lovenox.  12-3/21/12: Cycle #1-2 Carbo/Taxol IV  3/29/12 - Started on Keflex by her PCP for infection in her healing wound (immediately below her umbilicus).    12-12: Cycle #3-6 IV chemo, Cycle #1 IV/IP chemo  12 -  8, CT PRADEEP - enrolled in  - observation arm  3/4/13-13:  6, 9, 12, 15, 20.  13: CT Chest,  Abdomen, Pelvis IMPRESSION:  1. Worsening metastatic ovarian carcinoma suggested by increased size of soft tissue nodules anterior to the right psoas muscle, that may represent growing mesenteric lymphadenopathy.  2. Remaining prominent right lower quadrant mesenteric lymph nodes are not significantly changed from CT 7/16/2012.  3. Clustered nodular opacities in the right lower lobe are not significant change from 7/16/2012 and remain indeterminate. Again, the appearance and distribution is suggestive of an infectious etiology.  4. Stable 8 mm soft tissue nodule in left breast, unchanged since at least 02/20/2012. This can be observed on followup studies, but correlation with mammography could be considered.  Decision made to start Doxil/Carbo.  7/11/13: The left ventricular ejection fraction is normal at 66.4%.  7/12/13-9/6/13: Cycle #1-3 Doxil/Carbo.  10, 11.    9/30/13 CT C/A/P Impression:  1. Overall, favorable response to treatment with decreasing size of soft tissue nodules tracking along the anterior aspect of the right psoas muscle.  2. 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  3. Improved cluster of right lower lobe pulmonary nodular opacities. These may represent resolving infection.  10/3/13-12/9/13:  9, 8, 10. Cycle 4-6 Doxil/Carbo.    1/13/14 CT C/A/P Impression:   1. Stable appearance of metastatic ovarian cancer. Scattered soft tissue nodules along the anterior aspect of the right psoas muscle are unchanged in size. Mild mesenteric lymphadenopathy is unchanged.  2. Clustered micronodules in the right lower lobe are unchanged from 9/30/2013, but improved from 7/1/2013. This history suggests a postinflammatory/postinfectious etiology.  3. 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  1/16/14 Discussed multiple options for her based on relatively stable-appearing disease on CT but slight increase in  (which has had small increase to 20 with last recurrence) including chemo  break with recheck of  in 1 month, starting new chemo agent immediately, and exploratory surgery with possible resection of nodules. She is considered platinum-sensitive based on > 1 year remission after Taxol/Carbo, which we will take into consideration for future chemo planning. I am not inclined to surgery at this time given difficulty she already has with diarrhea secondary to past colon resection. I suspect we would need to resect further bowel due to mesenteric disease. Also explained inherent risks of any major surgery. Also mentioned maintenance chemo, but this has not been shown to increase overall survival and would likely decrease her quality of life without significant benefit. Family is going on vacation to [place] in 2 weeks and Odalys does not want to have chemo prior to that, so will plan to take 1 month break. She can have  at that time (discussed checking toady since last draw was in early December, but as it would likely not change treatment plan and she has h/o slow rising , will not check today).  2/17/14  16    2/20/14: Decision to take break from chemo for two months, followed by CT and CA-125.    4/21/14  27  4/21/14 CT C/A/P Impression:    1. Increased size of 2 low-attenuation lymph nodes anterior to the right psoas muscle is concerning for worsening metastatic ovarian cancer.    2. New circumferential thickening of a 3.8 cm length segment of distal transverse colon is likely physiologic. Recommend attention on followup imaging.    3. Grossly unchanged size of clustered small nodules versus scarring in the right lower lobe the lungs.    4. Stable thrombosis of the right ovarian vein.  5/14/14: Diagnostic laparoscopy converted to exploratory laparotomy and removal of mesenteric masses, tumor debulking, peritoneal biopsies and intraperitoneal port placement. On laparoscopy, it was noted that there were small nodules on the anterior abdominal wall near the  "previous incision, small nodules on the right pelvic sidewall as well. Nodules were palpated in the mesentery; however, as it was unable to clarify where the origin of the nodules was, the decision was made to open the patient. On opening there was found to be approximately a 3 cm nodule in the small bowel mesentery and another separate approximately 2 cm nodule in the bowel mesentery. Pelvis without evidence of cancer, some mesenteric lymph nodes were palpated. No evidence otherwise of any disseminated cancer throughout the abdomen.    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 (2/2)  -Largest metastasis: 1.3 cm  -See comment  D: Peritoneum, right paracolic gutter #2, biopsy:  -Fibroadipose tissue with granulomatous inflammation surrounding refractile material  -Negative for malignancy   E: Small bowel adhesion, biopsy:  -Fibroconnective tissue, consistent with adhesion  -Negative for malignancy  F: Lymph nodes, mesentry, not otherwise specified, excision:  -Two lymph nodes, negative for metastatic carcinoma (0/2)  G: Lymph node, mesentery, \"#2\", excision:  -One lymph node, negative for metastatic carcinoma (0/1)  H: Lymph nodes, mesentery, \"nodule #2\", excision:  -Five lymph nodes, negative for metastatic carcinoma (0/5)  COMMENT:  Some of the specimens show post-operative changes. Others show possible treatment related changes, including necrosis. The metastatic carcinoma in the mesenteric lymph nodes (specimen C) shows variable morphology, including relatively low grade tumor with papillary architecture, and high grade tumor comprised of nests of tumor cells with " irregular, slit-like spaces and marked nuclear pleomorphism.    5/29/14: Cycle 1 IV PACLitaxel / IP CISplatin / IP PACLitaxel.  - 28.  6/26/14: Cycle #2 IV/IP.  10  8/5/14: CT chest/abd/pelvis IMPRESSION     1. In this patient with ovarian cancer, overall findings are indicative of stable/slight improvement, as multiple mesenteric lymphadenopathy and scattered nodular peritoneal soft tissue mass lesions appear unchanged or slightly smaller since 4/21/2014.    2. 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 Mild dilatation of the second and the third portion of the duodenum with a narrow SMA angle. This could represent SMA syndrome, if clinically correlated.17/14:    Cycle #3 IV/IP.  10.    CT chest/abd/pelvis with contrast on 8/5/14    Impression:    1. In this patient with ovarian cancer, overall findings are indicative of stable/slight improvement, as multiple mesenteric lymphadenopathy and scattered nodular peritoneal soft tissue mass lesions appear unchanged or slightly smaller since 4/21/2014.    2. 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 Mild dilatation of the second and the third portion of the duodenum with a narrow SMA angle. This could represent SMA syndrome, if clinically correlated.  8/7/14: Cycle #4 Taxol/Carbo (changed from IV/IP).  10. She has been feeling okay. She is unsure if she can finish out the course of 6 cycles IV/IP taxol/cisplatin. She feels like she has the flu for about a week then starts feeling gradually better after each chemo cycle. Her spouse notes that she actually has been more sick with the treatments than she initially admits here. She was also previously having some rib pain. Denies any rib pain now. Denies any chest pain or shortness of breath.  Plan: discussed recent CT cap results and switching to just IV as she is feeling miserable with IP treatments. Switch to IV carbo/taxol as patient is platinum sensitive.  We  also discussed her taking part of the tesaro trial, which would require BRCA testing. She would like to take part in this trial if eligible.  8/20/14: Remove Intraperitoneal Port ( Port and catheter intact - discarded)  8/28/14: Cycle #5 Taxol/Carbo held due to thrombocytopenia.  6.    She denies any vaginal bleeding, no changes in her bowel or bladder habits, no nausea/emesis, no lower extremity edema, and no difficulties eating or sleeping. She denies any abdominal discomfort/bloating, no fevers or chills, and no chest pain or shortness of breath. She states her diarrhea is the same. She reports some fatigue which improves about 1-2 weeks after her chemotherapy. She states she does not need any medication refills and she was told she does not meet the criteria for the TESARO trial. She states she has 3 bags of iv fluids left over from her previous chemotherapy and will give these to herself. She states she is ready for her treatment today.    9/29/14: Cycle #6 Taxol/Carbo  6. Insurance questions regarding GSF coverage today. No concerns other than fatigue. Taking iron for anemia and does not desire blood transfusion. Using neulasta for neutropenia. Using home IV hydration if needed. Baseline unchanged. No abdominal bloating, constipation, diarrhea, pain, vaginal or rectal bleeding, cough or dyspnea, fluid retention.    10/16/14: Impression:    1. Nodular peritoneal soft tissue mass in the right lower quadrant adjacent to the psoas muscle is no longer appreciated. Adjacent prominent lymphadenopathy is unchanged from previous exam. No new peritoneal lesions.    2. 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     10/20/14:  5. CT chest/abdomen/pelvis on 10/16/14 showed nodular peritoneal soft tissue mass in the right lower quadrant adjacent to the psoas muscle is no longer appreciated. Adjacent prominent lymphadenopathy is unchanged from previous exam. 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  1/27/15:  6.  4/28/15:  14.  5/26/15:  18.  6/2/15: CT cap Impression:  1. Postsurgical changes of hysterectomy and bilateral salpingo-oophorectomy for ovarian cancer. There is a new 8 mm hazy, ill-defined hypoattenuating lesion in hepatic segment 6 which is suspicious for a metastatic deposit. Further evaluation with ultrasound in recommended.    2. Increased size of a left retroperitoneal lymph node which is indeterminate but may represent a ciro metastasis. Mildly prominent lymph nodes in the right lower quadrant are not significantly changed.  3. Moderate colonic stool burden.    6/4/15: US abdomen IMPRESSION:    Hyperechoic lesion in the right hepatic lobe, consistent with hemangioma. This does not corresponds to the area of the lesion seen on CT from 6/2/2015. An MR would be helpful for identifying and characterizing the lesion from the recent CT.  6/12/15: MR abd IMPRESSION:  1. New 20 x 11 mm enhancing lesions between the right obliques, concerning for metastatic disease. This lesions should be amenable to percutaneous biopsy, if indicated.  2. Correlating to the lesion visualized on comparison CT is a hepatic segment 6 subcapsular 7 mm lesion. Overall the appearance favors the diagnosis of a simple cyst. However, there is faint suggestion of mild peripheral arterial enhancement. Although this is favored as  artifactual, this should be followed up to confirm stability. Recommend 6 month followup.  3. Hepatic segment 6, 5 mm lesion too small to technically characterize. Differential would favor FNH, less likely flash filling hemangioma. Recommend attention on followup.  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      9/1/15: Cycle #1 Avastin/Cytoxan.   31.      9/24/15:feeling generally well. She  says she has been having back and stomach spasms. She is taking cytosine daily (she ran out yesterday). She also says its affecting her voice. Admits that it burns occasionally. She is eating and drinking normal. She also admit diarrhea, 5-7 times daily, lose/watery. She trying to stay hydrated and eat fiber. She also says that her body is sore, especially the bottom of her feet. Her blood pressure is normal. She also admits having headache after her first infusion.       9/24/15: Cycle #2 Avastin/Cytoxan.  19.  10/15/15: Cycle #3 Avastin/Cytoxan.  16.      11/6/15: CT c/a/p IMPRESSION:    1. Stable postoperative change of MAR/BSO for ovarian cancer.  2. The lesion in the right flank abdominal musculature is slightly decreased in size. Otherwise, stable examination.  2. No evidence of metastatic disease in the chest.     11/20/15: Treatment planning visit,  16     11/25/15: surgical pathology report  FINAL DIAGNOSIS:  Soft tissue, right oblique muscle mass, excision:  -Recurrent ovarian serous carcinoma  -Carcinoma is present less than 1 mm from one resection margin  -Background skeletal muscle and fibroadipose tissue     1/4/16:  22  1/11/16-1/27/16: Radiation to right flank x 12 treatments  4/7/2016:  94. CT cap IMPRESSION:    In this patient with ovarian cancer status post MAR/BSO and descending/transverse colectomy:  1. No evidence for malignancy in the chest, abdomen, or pelvis.  2. Stable small hypodense segment 6 liver lesion, appears more likely benign, possibly a cyst.  5/13/16:  124.  6/3/16: PET CT IMPRESSION: In this patient with a history of ovarian cancer:  1. Hypermetabolic and enlarging periaortic and perihepatic lymphadenopathy compatible with metastatic disease, as detailed above.  2. Although hypodense lesion in hepatic segment 6 has been present since 6/2/2015 associated hypermetabolism makes this lesion highly concerning for metastatic disease.     Plan: to  start Niraparib under TESARO study.      6/9/16:  137.  6/14/16: Cycle #1 Niraparib.    6/28/16: Cycle #1 D15 Niraparib.    7/5/16:  100.    7/11/16: Cycle #2 Nraparib.   83.     8/3/2016: PET CT IMPRESSION:    In this patient with known history of ovarian cancer:  1) New pleural based nodular opacities in the lateral and inferior aspects of the bilateral lower lobes, worse in the left lung. Likely infection. Close follow up is recommended.      2) Slight decrease in hypermetabolic abdominal lymphadenopathy. 2 hypermetabolic lymph nodes persist.  3) Unchanged right hepatic lobe metastatic lesion.      8/9/16: Cycle #3 Niraparib.  69.  9/6/16: Cycle #4 Niraparib.  53. Dose held due to anemia.  9/13/16: Eval for potential cycle 4 niraparib. Dose held due to anemia.  10/4/16: CT CAP impression:  IMPRESSION: In this patient with a known history of ovarian cancer:  1. There has been interval resolution of pleural-based nodular  opacities which likely represented infection.  2. Abdominal lymphadenopathy in the form of 2 portacaval lymph nodes  have not significantly changed in size, noted to be hypermetabolic on  prior PET/CT.  3. Previously demonstrated metastatic lesion in the right lobe of the  liver is not significantly changed.  10/11/16:  60  11/1/16: C6 niraparib,  75  11/29/16: C7 niraparib.  78. CT CAP impression as follows:  Target lesions (RECIST criteria):       A previously described target lesion superior to the head of the  pancreas (series 2, image 64)  (referred to as a perihepatic node on  6/3/2016) may not be a valid target lesion because it measured less  than 1.5 cm originally. However, this particular node has decreased in  size, now measuring 7 mm in short axis versus 14 mm on 6/3/2016 when  measured in a similar fashion.       2.3 cm short axis portacaval lymph node on series 2 image 67,  previously 2.0 cm on 10/4/2016.       1.2 cm subtle hypodensity  in hepatic segment 6 on series 2 image 75,  stable on multiple studies since at least 6/3/2016     Sum of diameters today: 3.5 cm. Sum of diameters 10/4/2016: 3.2 cm.  Growth = 9%.    12/27/16: C8 niraparib.  105.  1/25/17: C9 niraparib.  108.  2/23/17: C10 niraparib.  132.   CT CAP impression:  Sum of target lesion diameters today: 3.7 cm. Sum of target lesion  diameters on 11/28/2016: 3.5 cm. Growth= 6%  1. In this patient with history of ovarian cancer there is stable  disease by RECIST criteria as evidenced by:   1a. Mildly increased size of liver metastasis.  1b. Stable portacaval lymphadenopathy.  1c. No evidence of metastatic disease in the chest.  2. Trace emphysematous changes of the lungs.  3/22/17: C11 niraparib.  132.  4/19/17: C12 niraparib.  127.  5/16/17: CT CAP IMPRESSION: In this patient with ovarian cancer:  1. Mildly increased size of hepatic metastasis segment 6 with subtle increased capsular retraction.  2. Stable edmundo hepatis nodes, with mild increase in size of periaortic lymph nodes.  3. Prominent left supraclavicular lymph node with subtle increase in size compared to prior studies, particularly comparing to 10/4/2016.  4. Mild subtle groundglass opacities in the right upper lobe, not present on prior study, lesser extent in the right lower lobe.  Findings may represent infection, additional consideration is malignancy (less likely), and attention on follow-up study  Recommended.  Addendum:   Prominent left supraclavicular lymph node (3/25) is stable from most recent CT performed 2/20/2017, currently measuring 14 x 16 mm, previously 14 x 16 mm on 2/20/2017.      The portal caval lymph node/edmundo hepatis lymph node is stable from 2/20/2017, measuring 21 mm.      Clarification of size of the para-aortic lymph node (series 3 image 327). It short axis measurement is 11 mm versus 9 mm on prior study.      Hepatic segment 6 triangular-shaped low density lesion  (3/362) measures 14 mm, previously measured 12 mm, demonstrating a  possible/questionable minimal subtle increase in size. Similarly the lymph nodes noted above in the supraclavicular and para-aortic regions demonstrate possible minimal possible subtle increase in size.      5/18/17: Cycle #13 Niraparib.  191.  6/15/17: Cycle #14 Niraparib.  146.  7/13/17: Cycle #15 Niraparib 200 mg.  171     CT (8/9/17):       IMPRESSION:  1. Segment 6 hepatic metastasis is stable to minimally increased in size.  2. Slight increase in multicentric adenopathy, most pronounced at the edmundo hepatis. Additional sites include the inferior left neck/supraclavicular region and retroperitoneum which appear stable to  minimally increased.      8/10/17:  175  9/14/17: MUGA LVEF 54%  9/22/17: C1D1 carboplatin/Doxil.  187.  10/19/17: C2D1 carboplatin/Doxil.  108.  11/17/17: C3D1 carboplatin/Doxil.  82.  12/14/17: C4D1 carboplatin/Doxil/avastin.  83.  Of note, avastin held on C4D14  1/12/18: C5D1 carboplatin/Doxil/avastin.  80.  1/26/18: platelets 61, patient was not given avastin due to being jehova's witness.   2/9/18: C6D1 carboplatin/Doxil/Avastin.   71.  3/2/18:  49. Three month treatment break.  6/20/2018:  170. CT CAP:  IMPRESSION: In this patient with history of ovarian cancer:  1. Increased size of a right hepatic lobe lesion and numerous edmundo hepatis and retroperitoneal lymph nodes compatible with progression of metastatic disease.  2. New mild intrahepatic biliary ductal dilatation, periportal edema, and pericholecystic fluid. Increased soft tissue fullness in the edmundo hepatis in the expected location of the common hepatic duct. Findings  are suspicious for developing biliary ductal obstruction secondary to worsening metastatic disease in the edmundo hepatis. Correlation with liver function tests is recommended. Right upper quadrant ultrasound  may be  beneficial.  3. Unchanged left supraclavicular and right hilar lymphadenopathy in the chest.      8/3/18: C1D1 weekly paclitaxel.  not done.  9/20/18: C2D1 weekly paclitaxel 80mg/m2. Ca 125-56  11/8/2018: C3D1 weekly paclitaxel;  26     12/17/18: Ct cap IMPRESSION:  1. New area of lobulated hypodense nodularity at the far-inferior right liver. This raises the possibility of a new area of hepatic metastasis.  2. Conversely, other nodules within the right liver are smaller suggesting improvement.  3. Improved adenopathy at the abdominal retroperitoneum. Improved left  supraclavicular adenopathy. Stable mildly prominent right hilar lymph nodes.  4. Previously noted ill-defined soft tissue at the edmundo hepatis region is still present but appears less prominent in size.  5. New finding of segmental wall thickening of the proximal sigmoid colon with some fluid distention of the adjacent colon. This could represent a segmental colitis. Other etiologies not excluded.    12/20/18:   19.  1/22/19:  45.  3/20/19: CT cap IMPRESSION:  1. Progression of disease with increasing size of a right inferior hepatic mass consistent with metastatic disease.  2. Increasing ill-defined multifocal regions of soft tissue at the edmundo hepatis consistent with malignant adenopathy versus malignant implants. Associated increased intrahepatic biliary ductal dilatation.  3. Other areas of progressive adenopathy noted at the left neck base, mediastinum, and abdominal retroperitoneum.  4. New area of carcinomatosis medial to stomach at the left upper abdomen.    3/20/19: Cycle #1 Weekly Paclitaxel.   180.  5/7/19: Cycle #2 Weekly Paclitaxel.    142.  6/20/19: Cycle #3 Weekly Paclitaxel/  pending.           Today she comes to clinic and denies any new issues. She does notice fatigue but is still able to do activity and walk her dogs twice a day. She continues to take iron TID. She denies any vaginal bleeding,  no changes in her bowel (continues dealing with diarrhea) or bladder habits, no nausea/emesis, no lower extremity edema, and no difficulties eating or sleeping. She denies any abdominal discomfort/bloating, no fevers or chills, and no chest pain or shortness of breath. She has not needed to take antiemetics and does not need any medications refilled. She continues to take K and Mg every day. She states her neuropathy from prior treatments is the same.           Review of Systems:    Systemic           no weight changes; no fever; no chills; no night sweats; no appetite changes; + fatigue  Skin           no rashes, or lesions  Eye           no irritation; no changes in vision  Ze-Laryngeal           no dysphagia; no hoarseness   Pulmonary    no cough; no shortness of breath  Cardiovascular    no chest pain; no palpitations  Gastrointestinal    + diarrhea; no constipation; no abdominal pain; no changes in bowel habits; no blood in stool  Genitourinary   no urinary frequency; no urinary urgency; no dysuria; no pain; no abnormal vaginal discharge; no abnormal vaginal bleeding  Breast    no breast discharge; no breast changes; no breast pain  Musculoskeletal    no myalgias; no arthralgias; no back pain  Psychiatric           no depressed mood; no anxiety    Hematologic              no tender lymph nodes; no noticeable swellings or lumps   Endocrine    no hot flashes; no heat/cold intolerance         Neurological   no tremor; no numbness and tingling; no headaches; no difficulty sleeping      Past Medical History:    Past Medical History:   Diagnosis Date     Antiplatelet or antithrombotic long-term use      Ascites      Blood clot in the legs      Diabetes (H)      Ovarian cancer (H)     serous,stg IV     Pleural effusion      Pulmonary embolism (H) 2/2012     Refusal of blood transfusions as patient is Jew      Short gut syndrome      Subclinical hypothyroidism 4/18/2013     Thrombosis of leg           Past Surgical History:    Past Surgical History:   Procedure Laterality Date     COLECTOMY       COLONOSCOPY  2/1/2012    Procedure:COLONOSCOPY; With Biopsy; Surgeon:TONI SULLIVAN; Location:[facility]     HYSTERECTOMY TOTAL ABD, RHIANNA SALPINGO-OOPHORECTOMY, NODE DISSECTION, TUMOR DEBULKING, COMBINED  2/1/2012    Procedure:COMBINED HYSTERECTOMY TOTAL ABDOMINAL, BILATERAL SALPINGO-OOPHORECTOMY, NODE DISSECTION, TUMOR DEBULKING;  Exploratory Laparotomy, Total Abdominal Hysterectomy, Bilateral Salpingo-Oophorectomy, appendectomy,lysis of adhesions, ileal, ascending, transverse and splenic flexure resection, ileal descending bowel renanastomosis, incidental cystotomy repair, CUSA procedure and colonoscopy ; [facility]     INSERT PORT PERITONEAL ACCESS  4/3/2012    Procedure:INSERT PORT PERITONEAL ACCESS; Intraperitoneal Port Placement (c-arm); Surgeon:SAMUEL CARRASCO; Location:[facility]     INSERT PORT PERITONEAL ACCESS  5/14/2014    Procedure: INSERT PORT PERITONEAL ACCESS;  Surgeon: Nga Yeung MD;  Location: [facility]     INSERT PORT VASCULAR ACCESS       LAPAROSCOPY DIAGNOSTIC (GYN)  5/14/2014    Procedure: LAPAROSCOPY DIAGNOSTIC (GYN);  Surgeon: Nga Yeung MD;  Location: [facility]     LAPAROTOMY EXPLORATORY Right 11/25/2015    Procedure: LAPAROTOMY EXPLORATORY;  Surgeon: Nga Yeung MD;  Location: [facility]     LAPAROTOMY, TUMOR DEBULKING, COMBINED  5/14/2014    Procedure: COMBINED LAPAROTOMY, TUMOR DEBULKING;  Surgeon: Nga Yeung MD;  Location: [facility]     REMOVE CATHETER PERITONEAL N/A 8/20/2014    Procedure: REMOVE CATHETER PERITONEAL;  Surgeon: Nga Yeung MD;  Location: [facility]     VASCULAR SURGERY      stent left iliac vein         Health Maintenance Due   Topic Date Due     ADVANCE CARE PLANNING  1960     HIV SCREENING  08/03/1973     DTAP/TDAP/TD IMMUNIZATION (1 - Tdap) 08/03/1983     LIPID  08/03/2003     ZOSTER IMMUNIZATION (1 of 2) 08/03/2008     MAMMO SCREENING   02/01/2015     PAP  04/04/2016     PHQ-2  01/01/2019       Current Medications:     Current Outpatient Medications   Medication Sig Dispense Refill     albuterol (PROAIR HFA/PROVENTIL HFA/VENTOLIN HFA) 108 (90 Base) MCG/ACT inhaler Inhale 2 puffs into the lungs every 6 hours as needed for shortness of breath / dyspnea or wheezing 1 Inhaler 1     Ascorbic Acid (VITAMIN C PO) Take 500 mg by mouth daily        Calcium Carbonate-Vitamin D (CALCIUM + D PO) Take 1 tablet by mouth daily.       cyanocobalamin (VITAMIN B12) 1000 MCG/ML injection Inject 1 mL (1,000 mcg) into the muscle every 30 days 1 mL 11     diphenoxylate-atropine (LOMOTIL) 2.5-0.025 MG per tablet Take 2 tablets by mouth 4 times daily as needed for diarrhea 60 tablet 0     Ferrous Sulfate 324 (65 Fe) MG TBEC TAKE ONE TABLET BY MOUTH THREE TIMES DAILY WITH MEALS. TAKE WITH A SMALL AMOUNT OF ORANGE JUICE. DO NOT TAKE WITH CALCIUM 90 tablet 11     HEMP OIL OR EXTRACT OR OTHER CBD CANNABINOID, NOT MEDICAL CANNABIS,        HERBALS daily        LEVOTHYROXINE SODIUM PO Take by mouth daily        loperamide (IMODIUM) 2 MG capsule Take 2 mg by mouth 4 times daily as needed for diarrhea       LORazepam (ATIVAN) 1 MG tablet Take 1 tablet (1 mg) by mouth every 6 hours as needed (Anxiety, Nausea/Vomiting or Sleep) 30 tablet 2     magnesium oxide (MAG-OX) 400 MG tablet Take 1 tablet (400 mg) by mouth 2 times daily 90 tablet 3     omeprazole (PRILOSEC) 20 MG DR capsule Take 1 capsule (20 mg) by mouth daily. 30 capsule 4     order for DME Injection Supplies for Vitamin B12: 3cc syringes w/ 27 gauge needles, 1/2 inch length 3 each 0     potassium chloride (KLOR-CON) 20 MEQ packet Take 20 mEq by mouth daily       VITAMIN E NATURAL PO Take 100 Units by mouth daily           Allergies:      No Known Allergies     Social History:     Social History     Tobacco Use     Smoking status: Former Smoker     Years: 8.00     Types: Cigarettes     Last attempt to quit: 6/21/1980      "Years since quittin.0     Smokeless tobacco: Never Used     Tobacco comment: started at 9 yo and quit at 18 yo   Substance Use Topics     Alcohol use: Yes     Comment: 3x/day wine or jodi       History   Drug Use No         Family History:     The patient's family history is notable for:    Family History   Problem Relation Age of Onset     Cancer Mother 69        lung, smoker     Cancer Maternal Uncle 65        brain     Colon Cancer Maternal Aunt 80        colon         Physical Exam:     /67   Pulse 79   Temp 97.7  F (36.5  C) (Oral)   Resp 16   Ht 1.651 m (5' 5\")   Wt 62 kg (136 lb 9.6 oz)   SpO2 98%   BMI 22.73 kg/m     Body mass index is 22.73 kg/m .    General Appearance: healthy and alert, no distress     HEENT: no thyromegaly, no palpable nodules or masses        Cardiovascular: regular rate and rhythm, no gallops, rubs or murmurs     Respiratory: lungs clear, no rales, rhonchi or wheezes, normal diaphragmatic excursion    Musculoskeletal: extremities non tender and without edema    Skin: no lesions or rashes     Neurological: normal gait, no gross defects     Psychiatric: appropriate mood and affect                               Hematological: normal cervical, supraclavicular lymph nodes     Gastrointestinal:       abdomen soft, non-tender, non-distended    Genitourinary: Deferred      Assessment:    Odalys Nathan is a 58 year old woman with a diagnosis of recurrent stage IIIC bilateral ovarian cancer. She is here today for follow up and disease management.     25 minutes were spent with this patient, over 50% of that time was spent in symptom management, treatment planning and in counseling and coordination of care.      Plan:     1.)        Ok to proceed with planned chemotherapy pending labs are WNL. She will have imaging and then see Dr. Yeung 7/15 for results. Reviewed signs and symptoms for when she should contact the clinic or seek additional care. Patient to contact the " clinic with any questions or concerns in the interim.     2.) Genetic risk factors were assessed and she is negative for mutations in BRCA1, BRCA2, EPCAM, MLH1, MSH2, MSH6, PMS2, PTEN, and TP53 genes.     3.) Labs and/or tests ordered include:  Chemo labs. .      4.) Health maintenance issues addressed today include annual health maintenance and non-gynecologic issues with PCP.    5.)        Continue to take K and Mg at home; to be replaced in infusion as needed.     HAILE Garcia, NP-BC, ANP-BC  Women's Health Nurse Practitioner  Adult Nurse Pracitioner  Division of Gynecologic Oncology          CC  Patient Care Team:  Aubrie Hester MD as PCP - General  Tammy Flores RN as Continuity Care Coordinator (Oncology)  Nga Yeung MD as MD (Oncology)  Patricia Rocha APRN CNP as Nurse Practitioner (Nurse Practitioner)      Again, thank you for allowing me to participate in the care of your patient.        Sincerely,        HAILE Meza CNP

## 2019-06-20 NOTE — PROGRESS NOTES
Follow Up Notes on Referred Patient    Date: 2019        RE: Odalys Nathan  : 1960  HOLA: 2019      Odalys Nathan is a 58 year old woman with a diagnosis of recurrent stage IIIC bilateral ovarian cancer. She is here today for follow up and disease management.     Oncology History:  2012 - Admitted to hospital for 2 weeks of intermittent abdominal cramping, distention, diarrhea and N/V. CT of abdomen/pelvis significant for small bowel obstruction, a heterogenous soft tissue density in the pelvis, omental nodules, and ascites. Bilateral adnexal masses per U/S of pelvis. CA-125 was elevated at 987, CEA was normal at 1.0.    12 - Therapeutic paracentesis (4 L) with cytology confirming malignancy (AR positive, weak ER positive, CK-7 positive consistent with GYN primary). Surgery recommended d/t potential for falling blood counts 2/ chemotherapy (patient is Zoroastrian and limiting blood transfusion)    12 - Exploratory laparotomy, MAR BSO, lysis of adhesion, Appendectomy, Repair cystotomy, Omentectomy Cvie-kjbqyj-seoirdinh-transverse-colon resection, Ileo-descending colon anastomosis, CUSA, & Colonoscopy (done by Dr. Amato and colorectal team).  2012 - Admitted to hospital for bilateral pulmonary emboli and drainage of pleural effusion. Started on Lovenox.  12-3/21/12: Cycle #1-2 Carbo/Taxol IV  3/29/12 - Started on Keflex by her PCP for infection in her healing wound (immediately below her umbilicus).    12-12: Cycle #3-6 IV chemo, Cycle #1 IV/IP chemo  12 -  8, CT PRADEEP - enrolled in  - observation arm  3/4/13-13:  6, 9, 12, 15, 20.  13: CT Chest, Abdomen, Pelvis IMPRESSION:  1. Worsening metastatic ovarian carcinoma suggested by increased size of soft tissue nodules anterior to the right psoas muscle, that may represent growing mesenteric lymphadenopathy.  2. Remaining prominent right lower quadrant  mesenteric lymph nodes are not significantly changed from CT 7/16/2012.  3. Clustered nodular opacities in the right lower lobe are not significant change from 7/16/2012 and remain indeterminate. Again, the appearance and distribution is suggestive of an infectious etiology.  4. Stable 8 mm soft tissue nodule in left breast, unchanged since at least 02/20/2012. This can be observed on followup studies, but correlation with mammography could be considered.  Decision made to start Doxil/Carbo.  7/11/13: The left ventricular ejection fraction is normal at 66.4%.  7/12/13-9/6/13: Cycle #1-3 Doxil/Carbo.  10, 11.    9/30/13 CT C/A/P Impression:  1. Overall, favorable response to treatment with decreasing size of soft tissue nodules tracking along the anterior aspect of the right psoas muscle.  2. 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  3. Improved cluster of right lower lobe pulmonary nodular opacities. These may represent resolving infection.  10/3/13-12/9/13:  9, 8, 10. Cycle 4-6 Doxil/Carbo.    1/13/14 CT C/A/P Impression:   1. Stable appearance of metastatic ovarian cancer. Scattered soft tissue nodules along the anterior aspect of the right psoas muscle are unchanged in size. Mild mesenteric lymphadenopathy is unchanged.  2. Clustered micronodules in the right lower lobe are unchanged from 9/30/2013, but improved from 7/1/2013. This history suggests a postinflammatory/postinfectious etiology.  3. 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  1/16/14 Discussed multiple options for her based on relatively stable-appearing disease on CT but slight increase in  (which has had small increase to 20 with last recurrence) including chemo break with recheck of  in 1 month, starting new chemo agent immediately, and exploratory surgery with possible resection of nodules. She is considered platinum-sensitive based on > 1 year remission after Taxol/Carbo, which we will take into consideration  for future chemo planning. I am not inclined to surgery at this time given difficulty she already has with diarrhea secondary to past colon resection. I suspect we would need to resect further bowel due to mesenteric disease. Also explained inherent risks of any major surgery. Also mentioned maintenance chemo, but this has not been shown to increase overall survival and would likely decrease her quality of life without significant benefit. Family is going on vacation to [place] in 2 weeks and Odalys does not want to have chemo prior to that, so will plan to take 1 month break. She can have  at that time (discussed checking toady since last draw was in early December, but as it would likely not change treatment plan and she has h/o slow rising , will not check today).  2/17/14  16    2/20/14: Decision to take break from chemo for two months, followed by CT and CA-125.    4/21/14  27  4/21/14 CT C/A/P Impression:    1. Increased size of 2 low-attenuation lymph nodes anterior to the right psoas muscle is concerning for worsening metastatic ovarian cancer.    2. New circumferential thickening of a 3.8 cm length segment of distal transverse colon is likely physiologic. Recommend attention on followup imaging.    3. Grossly unchanged size of clustered small nodules versus scarring in the right lower lobe the lungs.    4. Stable thrombosis of the right ovarian vein.  5/14/14: Diagnostic laparoscopy converted to exploratory laparotomy and removal of mesenteric masses, tumor debulking, peritoneal biopsies and intraperitoneal port placement. On laparoscopy, it was noted that there were small nodules on the anterior abdominal wall near the previous incision, small nodules on the right pelvic sidewall as well. Nodules were palpated in the mesentery; however, as it was unable to clarify where the origin of the nodules was, the decision was made to open the patient. On opening there was found to be  "approximately a 3 cm nodule in the small bowel mesentery and another separate approximately 2 cm nodule in the bowel mesentery. Pelvis without evidence of cancer, some mesenteric lymph nodes were palpated. No evidence otherwise of any disseminated cancer throughout the abdomen.    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 (2/2)  -Largest metastasis: 1.3 cm  -See comment  D: Peritoneum, right paracolic gutter #2, biopsy:  -Fibroadipose tissue with granulomatous inflammation surrounding refractile material  -Negative for malignancy   E: Small bowel adhesion, biopsy:  -Fibroconnective tissue, consistent with adhesion  -Negative for malignancy  F: Lymph nodes, mesentry, not otherwise specified, excision:  -Two lymph nodes, negative for metastatic carcinoma (0/2)  G: Lymph node, mesentery, \"#2\", excision:  -One lymph node, negative for metastatic carcinoma (0/1)  H: Lymph nodes, mesentery, \"nodule #2\", excision:  -Five lymph nodes, negative for metastatic carcinoma (0/5)  COMMENT:  Some of the specimens show post-operative changes. Others show possible treatment related changes, including necrosis. The metastatic carcinoma in the mesenteric lymph nodes (specimen C) shows variable morphology, including relatively low grade tumor with papillary architecture, and high grade tumor comprised of nests of tumor cells with irregular, slit-like spaces and marked nuclear pleomorphism.    5/29/14: Cycle 1 IV PACLitaxel / IP CISplatin / IP PACLitaxel.  - 28.  6/26/14: Cycle #2 IV/IP.  10  8/5/14: CT chest/abd/pelvis IMPRESSION     1. In this patient with ovarian cancer, " overall findings are indicative of stable/slight improvement, as multiple mesenteric lymphadenopathy and scattered nodular peritoneal soft tissue mass lesions appear unchanged or slightly smaller since 4/21/2014.    2. 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 Mild dilatation of the second and the third portion of the duodenum with a narrow SMA angle. This could represent SMA syndrome, if clinically correlated.17/14:    Cycle #3 IV/IP.  10.    CT chest/abd/pelvis with contrast on 8/5/14    Impression:    1. In this patient with ovarian cancer, overall findings are indicative of stable/slight improvement, as multiple mesenteric lymphadenopathy and scattered nodular peritoneal soft tissue mass lesions appear unchanged or slightly smaller since 4/21/2014.    2. 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 Mild dilatation of the second and the third portion of the duodenum with a narrow SMA angle. This could represent SMA syndrome, if clinically correlated.  8/7/14: Cycle #4 Taxol/Carbo (changed from IV/IP).  10. She has been feeling okay. She is unsure if she can finish out the course of 6 cycles IV/IP taxol/cisplatin. She feels like she has the flu for about a week then starts feeling gradually better after each chemo cycle. Her spouse notes that she actually has been more sick with the treatments than she initially admits here. She was also previously having some rib pain. Denies any rib pain now. Denies any chest pain or shortness of breath.  Plan: discussed recent CT cap results and switching to just IV as she is feeling miserable with IP treatments. Switch to IV carbo/taxol as patient is platinum sensitive.  We also discussed her taking part of the tesaro trial, which would require BRCA testing. She would like to take part in this trial if eligible.  8/20/14: Remove Intraperitoneal Port ( Port and catheter intact - discarded)  8/28/14: Cycle #5 Taxol/Carbo held due to  thrombocytopenia.  6.    She denies any vaginal bleeding, no changes in her bowel or bladder habits, no nausea/emesis, no lower extremity edema, and no difficulties eating or sleeping. She denies any abdominal discomfort/bloating, no fevers or chills, and no chest pain or shortness of breath. She states her diarrhea is the same. She reports some fatigue which improves about 1-2 weeks after her chemotherapy. She states she does not need any medication refills and she was told she does not meet the criteria for the TESARO trial. She states she has 3 bags of iv fluids left over from her previous chemotherapy and will give these to herself. She states she is ready for her treatment today.    9/29/14: Cycle #6 Taxol/Carbo  6. Insurance questions regarding GSF coverage today. No concerns other than fatigue. Taking iron for anemia and does not desire blood transfusion. Using neulasta for neutropenia. Using home IV hydration if needed. Baseline unchanged. No abdominal bloating, constipation, diarrhea, pain, vaginal or rectal bleeding, cough or dyspnea, fluid retention.    10/16/14: Impression:    1. Nodular peritoneal soft tissue mass in the right lower quadrant adjacent to the psoas muscle is no longer appreciated. Adjacent prominent lymphadenopathy is unchanged from previous exam. No new peritoneal lesions.    2. 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     10/20/14:  5. CT chest/abdomen/pelvis on 10/16/14 showed nodular peritoneal soft tissue mass in the right lower quadrant adjacent to the psoas muscle is no longer appreciated. Adjacent prominent lymphadenopathy is unchanged from previous exam. 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  1/27/15:  6.  4/28/15:  14.  5/26/15:  18.  6/2/15: CT cap Impression:  1. Postsurgical changes of hysterectomy and bilateral salpingo-oophorectomy for ovarian cancer. There is a new 8 mm hazy,  ill-defined hypoattenuating lesion in hepatic segment 6 which is suspicious for a metastatic deposit. Further evaluation with ultrasound in recommended.    2. Increased size of a left retroperitoneal lymph node which is indeterminate but may represent a ciro metastasis. Mildly prominent lymph nodes in the right lower quadrant are not significantly changed.  3. Moderate colonic stool burden.    6/4/15: US abdomen IMPRESSION:    Hyperechoic lesion in the right hepatic lobe, consistent with hemangioma. This does not corresponds to the area of the lesion seen on CT from 6/2/2015. An MR would be helpful for identifying and characterizing the lesion from the recent CT.  6/12/15: MR abd IMPRESSION:  1. New 20 x 11 mm enhancing lesions between the right obliques, concerning for metastatic disease. This lesions should be amenable to percutaneous biopsy, if indicated.  2. Correlating to the lesion visualized on comparison CT is a hepatic segment 6 subcapsular 7 mm lesion. Overall the appearance favors the diagnosis of a simple cyst. However, there is faint suggestion of mild peripheral arterial enhancement. Although this is favored as  artifactual, this should be followed up to confirm stability. Recommend 6 month followup.  3. Hepatic segment 6, 5 mm lesion too small to technically characterize. Differential would favor FNH, less likely flash filling hemangioma. Recommend attention on followup.  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      9/1/15: Cycle #1 Avastin/Cytoxan.   31.      9/24/15:feeling generally well. She says she has been having back and stomach spasms. She is taking cytosine daily (she ran out yesterday). She also says its affecting her voice. Admits that it burns occasionally. She is eating and drinking normal. She also admit diarrhea, 5-7 times daily, lose/watery.  She trying to stay hydrated and eat fiber. She also says that her body is sore, especially the bottom of her feet. Her blood pressure is normal. She also admits having headache after her first infusion.       9/24/15: Cycle #2 Avastin/Cytoxan.  19.  10/15/15: Cycle #3 Avastin/Cytoxan.  16.      11/6/15: CT c/a/p IMPRESSION:    1. Stable postoperative change of MAR/BSO for ovarian cancer.  2. The lesion in the right flank abdominal musculature is slightly decreased in size. Otherwise, stable examination.  2. No evidence of metastatic disease in the chest.     11/20/15: Treatment planning visit,  16     11/25/15: surgical pathology report  FINAL DIAGNOSIS:  Soft tissue, right oblique muscle mass, excision:  -Recurrent ovarian serous carcinoma  -Carcinoma is present less than 1 mm from one resection margin  -Background skeletal muscle and fibroadipose tissue     1/4/16:  22  1/11/16-1/27/16: Radiation to right flank x 12 treatments  4/7/2016:  94. CT cap IMPRESSION:    In this patient with ovarian cancer status post MAR/BSO and descending/transverse colectomy:  1. No evidence for malignancy in the chest, abdomen, or pelvis.  2. Stable small hypodense segment 6 liver lesion, appears more likely benign, possibly a cyst.  5/13/16:  124.  6/3/16: PET CT IMPRESSION: In this patient with a history of ovarian cancer:  1. Hypermetabolic and enlarging periaortic and perihepatic lymphadenopathy compatible with metastatic disease, as detailed above.  2. Although hypodense lesion in hepatic segment 6 has been present since 6/2/2015 associated hypermetabolism makes this lesion highly concerning for metastatic disease.     Plan: to start Niraparib under TESARO study.      6/9/16:  137.  6/14/16: Cycle #1 Niraparib.    6/28/16: Cycle #1 D15 Niraparib.    7/5/16:  100.    7/11/16: Cycle #2 Nraparib.   83.     8/3/2016: PET CT IMPRESSION:    In this patient with known history of  ovarian cancer:  1) New pleural based nodular opacities in the lateral and inferior aspects of the bilateral lower lobes, worse in the left lung. Likely infection. Close follow up is recommended.      2) Slight decrease in hypermetabolic abdominal lymphadenopathy. 2 hypermetabolic lymph nodes persist.  3) Unchanged right hepatic lobe metastatic lesion.      8/9/16: Cycle #3 Niraparib.  69.  9/6/16: Cycle #4 Niraparib.  53. Dose held due to anemia.  9/13/16: Eval for potential cycle 4 niraparib. Dose held due to anemia.  10/4/16: CT CAP impression:  IMPRESSION: In this patient with a known history of ovarian cancer:  1. There has been interval resolution of pleural-based nodular  opacities which likely represented infection.  2. Abdominal lymphadenopathy in the form of 2 portacaval lymph nodes  have not significantly changed in size, noted to be hypermetabolic on  prior PET/CT.  3. Previously demonstrated metastatic lesion in the right lobe of the  liver is not significantly changed.  10/11/16:  60  11/1/16: C6 niraparib,  75  11/29/16: C7 niraparib.  78. CT CAP impression as follows:  Target lesions (RECIST criteria):       A previously described target lesion superior to the head of the  pancreas (series 2, image 64)  (referred to as a perihepatic node on  6/3/2016) may not be a valid target lesion because it measured less  than 1.5 cm originally. However, this particular node has decreased in  size, now measuring 7 mm in short axis versus 14 mm on 6/3/2016 when  measured in a similar fashion.       2.3 cm short axis portacaval lymph node on series 2 image 67,  previously 2.0 cm on 10/4/2016.       1.2 cm subtle hypodensity in hepatic segment 6 on series 2 image 75,  stable on multiple studies since at least 6/3/2016     Sum of diameters today: 3.5 cm. Sum of diameters 10/4/2016: 3.2 cm.  Growth = 9%.    12/27/16: C8 niraparib.  105.  1/25/17: C9 niraparib.  108.  2/23/17:  C10 niraparib.  132.   CT CAP impression:  Sum of target lesion diameters today: 3.7 cm. Sum of target lesion  diameters on 11/28/2016: 3.5 cm. Growth= 6%  1. In this patient with history of ovarian cancer there is stable  disease by RECIST criteria as evidenced by:   1a. Mildly increased size of liver metastasis.  1b. Stable portacaval lymphadenopathy.  1c. No evidence of metastatic disease in the chest.  2. Trace emphysematous changes of the lungs.  3/22/17: C11 niraparib.  132.  4/19/17: C12 niraparib.  127.  5/16/17: CT CAP IMPRESSION: In this patient with ovarian cancer:  1. Mildly increased size of hepatic metastasis segment 6 with subtle increased capsular retraction.  2. Stable edmundo hepatis nodes, with mild increase in size of periaortic lymph nodes.  3. Prominent left supraclavicular lymph node with subtle increase in size compared to prior studies, particularly comparing to 10/4/2016.  4. Mild subtle groundglass opacities in the right upper lobe, not present on prior study, lesser extent in the right lower lobe.  Findings may represent infection, additional consideration is malignancy (less likely), and attention on follow-up study  Recommended.  Addendum:   Prominent left supraclavicular lymph node (3/25) is stable from most recent CT performed 2/20/2017, currently measuring 14 x 16 mm, previously 14 x 16 mm on 2/20/2017.      The portal caval lymph node/edmundo hepatis lymph node is stable from 2/20/2017, measuring 21 mm.      Clarification of size of the para-aortic lymph node (series 3 image 327). It short axis measurement is 11 mm versus 9 mm on prior study.      Hepatic segment 6 triangular-shaped low density lesion (3/362) measures 14 mm, previously measured 12 mm, demonstrating a  possible/questionable minimal subtle increase in size. Similarly the lymph nodes noted above in the supraclavicular and para-aortic regions demonstrate possible minimal possible subtle increase in  size.      5/18/17: Cycle #13 Niraparib.  191.  6/15/17: Cycle #14 Niraparib.  146.  7/13/17: Cycle #15 Niraparib 200 mg.  171     CT (8/9/17):       IMPRESSION:  1. Segment 6 hepatic metastasis is stable to minimally increased in size.  2. Slight increase in multicentric adenopathy, most pronounced at the edmundo hepatis. Additional sites include the inferior left neck/supraclavicular region and retroperitoneum which appear stable to  minimally increased.      8/10/17:  175  9/14/17: MUGA LVEF 54%  9/22/17: C1D1 carboplatin/Doxil.  187.  10/19/17: C2D1 carboplatin/Doxil.  108.  11/17/17: C3D1 carboplatin/Doxil.  82.  12/14/17: C4D1 carboplatin/Doxil/avastin.  83.  Of note, avastin held on C4D14  1/12/18: C5D1 carboplatin/Doxil/avastin.  80.  1/26/18: platelets 61, patient was not given avastin due to being jehova's witness.   2/9/18: C6D1 carboplatin/Doxil/Avastin.   71.  3/2/18:  49. Three month treatment break.  6/20/2018:  170. CT CAP:  IMPRESSION: In this patient with history of ovarian cancer:  1. Increased size of a right hepatic lobe lesion and numerous edmundo hepatis and retroperitoneal lymph nodes compatible with progression of metastatic disease.  2. New mild intrahepatic biliary ductal dilatation, periportal edema, and pericholecystic fluid. Increased soft tissue fullness in the edmundo hepatis in the expected location of the common hepatic duct. Findings  are suspicious for developing biliary ductal obstruction secondary to worsening metastatic disease in the edmundo hepatis. Correlation with liver function tests is recommended. Right upper quadrant ultrasound  may be beneficial.  3. Unchanged left supraclavicular and right hilar lymphadenopathy in the chest.      8/3/18: C1D1 weekly paclitaxel.  not done.  9/20/18: C2D1 weekly paclitaxel 80mg/m2. Ca 125-56  11/8/2018: C3D1 weekly paclitaxel;  26     12/17/18: Ct cap  IMPRESSION:  1. New area of lobulated hypodense nodularity at the far-inferior right liver. This raises the possibility of a new area of hepatic metastasis.  2. Conversely, other nodules within the right liver are smaller suggesting improvement.  3. Improved adenopathy at the abdominal retroperitoneum. Improved left  supraclavicular adenopathy. Stable mildly prominent right hilar lymph nodes.  4. Previously noted ill-defined soft tissue at the edmundo hepatis region is still present but appears less prominent in size.  5. New finding of segmental wall thickening of the proximal sigmoid colon with some fluid distention of the adjacent colon. This could represent a segmental colitis. Other etiologies not excluded.    12/20/18:   19.  1/22/19:  45.  3/20/19: CT cap IMPRESSION:  1. Progression of disease with increasing size of a right inferior hepatic mass consistent with metastatic disease.  2. Increasing ill-defined multifocal regions of soft tissue at the edmundo hepatis consistent with malignant adenopathy versus malignant implants. Associated increased intrahepatic biliary ductal dilatation.  3. Other areas of progressive adenopathy noted at the left neck base, mediastinum, and abdominal retroperitoneum.  4. New area of carcinomatosis medial to stomach at the left upper abdomen.    3/20/19: Cycle #1 Weekly Paclitaxel.   180.  5/7/19: Cycle #2 Weekly Paclitaxel.    142.  6/20/19: Cycle #3 Weekly Paclitaxel/  pending.           Today she comes to clinic and denies any new issues. She does notice fatigue but is still able to do activity and walk her dogs twice a day. She continues to take iron TID. She denies any vaginal bleeding, no changes in her bowel (continues dealing with diarrhea) or bladder habits, no nausea/emesis, no lower extremity edema, and no difficulties eating or sleeping. She denies any abdominal discomfort/bloating, no fevers or chills, and no chest pain or shortness of  breath. She has not needed to take antiemetics and does not need any medications refilled. She continues to take K and Mg every day. She states her neuropathy from prior treatments is the same.           Review of Systems:    Systemic           no weight changes; no fever; no chills; no night sweats; no appetite changes; + fatigue  Skin           no rashes, or lesions  Eye           no irritation; no changes in vision  Ze-Laryngeal           no dysphagia; no hoarseness   Pulmonary    no cough; no shortness of breath  Cardiovascular    no chest pain; no palpitations  Gastrointestinal    + diarrhea; no constipation; no abdominal pain; no changes in bowel habits; no blood in stool  Genitourinary   no urinary frequency; no urinary urgency; no dysuria; no pain; no abnormal vaginal discharge; no abnormal vaginal bleeding  Breast    no breast discharge; no breast changes; no breast pain  Musculoskeletal    no myalgias; no arthralgias; no back pain  Psychiatric           no depressed mood; no anxiety    Hematologic              no tender lymph nodes; no noticeable swellings or lumps   Endocrine    no hot flashes; no heat/cold intolerance         Neurological   no tremor; no numbness and tingling; no headaches; no difficulty sleeping      Past Medical History:    Past Medical History:   Diagnosis Date     Antiplatelet or antithrombotic long-term use      Ascites      Blood clot in the legs      Diabetes (H)      Ovarian cancer (H)     serous,stg IV     Pleural effusion      Pulmonary embolism (H) 2/2012     Refusal of blood transfusions as patient is Alevism      Short gut syndrome      Subclinical hypothyroidism 4/18/2013     Thrombosis of leg          Past Surgical History:    Past Surgical History:   Procedure Laterality Date     COLECTOMY       COLONOSCOPY  2/1/2012    Procedure:COLONOSCOPY; With Biopsy; Surgeon:TONI SULLIVAN; Location:[facility]     HYSTERECTOMY TOTAL ABD, RHIANNA SALPINGO-OOPHORECTOMY, NODE  DISSECTION, TUMOR DEBULKING, COMBINED  2/1/2012    Procedure:COMBINED HYSTERECTOMY TOTAL ABDOMINAL, BILATERAL SALPINGO-OOPHORECTOMY, NODE DISSECTION, TUMOR DEBULKING;  Exploratory Laparotomy, Total Abdominal Hysterectomy, Bilateral Salpingo-Oophorectomy, appendectomy,lysis of adhesions, ileal, ascending, transverse and splenic flexure resection, ileal descending bowel renanastomosis, incidental cystotomy repair, CUSA procedure and colonoscopy ; [facility]     INSERT PORT PERITONEAL ACCESS  4/3/2012    Procedure:INSERT PORT PERITONEAL ACCESS; Intraperitoneal Port Placement (c-arm); Surgeon:SAMUEL CARRASCO; Location:[facility]     INSERT PORT PERITONEAL ACCESS  5/14/2014    Procedure: INSERT PORT PERITONEAL ACCESS;  Surgeon: Nga Yeung MD;  Location: [facility]     INSERT PORT VASCULAR ACCESS       LAPAROSCOPY DIAGNOSTIC (GYN)  5/14/2014    Procedure: LAPAROSCOPY DIAGNOSTIC (GYN);  Surgeon: Nga Yeung MD;  Location: [facility]     LAPAROTOMY EXPLORATORY Right 11/25/2015    Procedure: LAPAROTOMY EXPLORATORY;  Surgeon: Nga Yeung MD;  Location: [facility]     LAPAROTOMY, TUMOR DEBULKING, COMBINED  5/14/2014    Procedure: COMBINED LAPAROTOMY, TUMOR DEBULKING;  Surgeon: Nga Yeung MD;  Location: [facility]     REMOVE CATHETER PERITONEAL N/A 8/20/2014    Procedure: REMOVE CATHETER PERITONEAL;  Surgeon: Nga Yeung MD;  Location: [facility]     VASCULAR SURGERY      stent left iliac vein         Health Maintenance Due   Topic Date Due     ADVANCE CARE PLANNING  1960     HIV SCREENING  08/03/1973     DTAP/TDAP/TD IMMUNIZATION (1 - Tdap) 08/03/1983     LIPID  08/03/2003     ZOSTER IMMUNIZATION (1 of 2) 08/03/2008     MAMMO SCREENING  02/01/2015     PAP  04/04/2016     PHQ-2  01/01/2019       Current Medications:     Current Outpatient Medications   Medication Sig Dispense Refill     albuterol (PROAIR HFA/PROVENTIL HFA/VENTOLIN HFA) 108 (90 Base) MCG/ACT inhaler Inhale 2 puffs into the lungs every 6  hours as needed for shortness of breath / dyspnea or wheezing 1 Inhaler 1     Ascorbic Acid (VITAMIN C PO) Take 500 mg by mouth daily        Calcium Carbonate-Vitamin D (CALCIUM + D PO) Take 1 tablet by mouth daily.       cyanocobalamin (VITAMIN B12) 1000 MCG/ML injection Inject 1 mL (1,000 mcg) into the muscle every 30 days 1 mL 11     diphenoxylate-atropine (LOMOTIL) 2.5-0.025 MG per tablet Take 2 tablets by mouth 4 times daily as needed for diarrhea 60 tablet 0     Ferrous Sulfate 324 (65 Fe) MG TBEC TAKE ONE TABLET BY MOUTH THREE TIMES DAILY WITH MEALS. TAKE WITH A SMALL AMOUNT OF ORANGE JUICE. DO NOT TAKE WITH CALCIUM 90 tablet 11     HEMP OIL OR EXTRACT OR OTHER CBD CANNABINOID, NOT MEDICAL CANNABIS,        HERBALS daily        LEVOTHYROXINE SODIUM PO Take by mouth daily        loperamide (IMODIUM) 2 MG capsule Take 2 mg by mouth 4 times daily as needed for diarrhea       LORazepam (ATIVAN) 1 MG tablet Take 1 tablet (1 mg) by mouth every 6 hours as needed (Anxiety, Nausea/Vomiting or Sleep) 30 tablet 2     magnesium oxide (MAG-OX) 400 MG tablet Take 1 tablet (400 mg) by mouth 2 times daily 90 tablet 3     omeprazole (PRILOSEC) 20 MG DR capsule Take 1 capsule (20 mg) by mouth daily. 30 capsule 4     order for DME Injection Supplies for Vitamin B12: 3cc syringes w/ 27 gauge needles, 1/2 inch length 3 each 0     potassium chloride (KLOR-CON) 20 MEQ packet Take 20 mEq by mouth daily       VITAMIN E NATURAL PO Take 100 Units by mouth daily           Allergies:      No Known Allergies     Social History:     Social History     Tobacco Use     Smoking status: Former Smoker     Years: 8.00     Types: Cigarettes     Last attempt to quit: 1980     Years since quittin.0     Smokeless tobacco: Never Used     Tobacco comment: started at 11 yo and quit at 18 yo   Substance Use Topics     Alcohol use: Yes     Comment: 3x/day wine or jodi       History   Drug Use No         Family History:     The patient's family  "history is notable for:    Family History   Problem Relation Age of Onset     Cancer Mother 69        lung, smoker     Cancer Maternal Uncle 65        brain     Colon Cancer Maternal Aunt 80        colon         Physical Exam:     /67   Pulse 79   Temp 97.7  F (36.5  C) (Oral)   Resp 16   Ht 1.651 m (5' 5\")   Wt 62 kg (136 lb 9.6 oz)   SpO2 98%   BMI 22.73 kg/m    Body mass index is 22.73 kg/m .    General Appearance: healthy and alert, no distress     HEENT: no thyromegaly, no palpable nodules or masses        Cardiovascular: regular rate and rhythm, no gallops, rubs or murmurs     Respiratory: lungs clear, no rales, rhonchi or wheezes, normal diaphragmatic excursion    Musculoskeletal: extremities non tender and without edema    Skin: no lesions or rashes     Neurological: normal gait, no gross defects     Psychiatric: appropriate mood and affect                               Hematological: normal cervical, supraclavicular lymph nodes     Gastrointestinal:       abdomen soft, non-tender, non-distended    Genitourinary: Deferred      Assessment:    Odalys Nathan is a 58 year old woman with a diagnosis of recurrent stage IIIC bilateral ovarian cancer. She is here today for follow up and disease management.     25 minutes were spent with this patient, over 50% of that time was spent in symptom management, treatment planning and in counseling and coordination of care.      Plan:     1.)        Ok to proceed with planned chemotherapy pending labs are WNL. She will have imaging and then see Dr. Yeung 7/15 for results. Reviewed signs and symptoms for when she should contact the clinic or seek additional care. Patient to contact the clinic with any questions or concerns in the interim.     2.) Genetic risk factors were assessed and she is negative for mutations in BRCA1, BRCA2, EPCAM, MLH1, MSH2, MSH6, PMS2, PTEN, and TP53 genes.     3.) Labs and/or tests ordered include:  Chemo labs. . "      4.) Health maintenance issues addressed today include annual health maintenance and non-gynecologic issues with PCP.    5.)        Continue to take K and Mg at home; to be replaced in infusion as needed.     HAILE Garcia, WHNP-BC, ANP-BC  Women's Health Nurse Practitioner  Adult Nurse Pracitioner  Division of Gynecologic Oncology          CC  Patient Care Team:  Aubrie Hester MD as PCP - General  Tammy Flores RN as Continuity Care Coordinator (Oncology)  Nga Yeung MD as MD (Oncology)  Patricia Rocha APRN CNP as Nurse Practitioner (Nurse Practitioner)

## 2019-06-20 NOTE — NURSING NOTE
"Oncology Rooming Note    June 20, 2019 10:05 AM   Odalys Nathan is a 58 year old female who presents for:    Chief Complaint   Patient presents with     Oncology Clinic Visit     Ovarian cancer, right / left     Initial Vitals: Pulse 79   Temp 97.7  F (36.5  C) (Oral)   Resp 16   Ht 1.651 m (5' 5\")   Wt 62 kg (136 lb 9.6 oz)   SpO2 98%   BMI 22.73 kg/m   Estimated body mass index is 22.73 kg/m  as calculated from the following:    Height as of this encounter: 1.651 m (5' 5\").    Weight as of this encounter: 62 kg (136 lb 9.6 oz). Body surface area is 1.69 meters squared.  No Pain (0) Comment: Data Unavailable   No LMP recorded. Patient has had a hysterectomy.  Allergies reviewed: Yes  Medications reviewed: Yes    Medications: Medication refills not needed today.  Pharmacy name entered into [employer]:    [id number] - [place], [place] - [address]  [facility] [place], [place] - [address] [facility] [place], [place] - [address] [facility]    Clinical concerns: f/u       Tiffani Michelle CMA              "

## 2019-06-20 NOTE — PROGRESS NOTES
Infusion Nursing Note:  Odalys Nathan presents today for Cycle 3, Day 1 Taxol and magnesium replacement.    Patient seen by provider today: Yes: Terrie Porras NP   present during visit today: Not Applicable.    Note: N/A.    Intravenous Access:  Implanted Port.    Treatment Conditions:  2 Gm Magnesium replaced  Lab Results   Component Value Date    HGB 11.1 06/20/2019     Lab Results   Component Value Date    WBC 5.4 06/20/2019      Lab Results   Component Value Date    ANEU 2.7 06/20/2019     Lab Results   Component Value Date     06/20/2019      Lab Results   Component Value Date     06/20/2019                   Lab Results   Component Value Date    POTASSIUM 3.4 06/20/2019           Lab Results   Component Value Date    MAG 1.2 06/20/2019            Lab Results   Component Value Date    CR 0.63 06/20/2019                   Lab Results   Component Value Date    JOSE ALBERTO 8.8 06/20/2019                Lab Results   Component Value Date    BILITOTAL 0.5 06/20/2019           Lab Results   Component Value Date    ALBUMIN 3.3 06/20/2019                    Lab Results   Component Value Date    ALT 93 06/20/2019           Lab Results   Component Value Date    AST 50 06/20/2019       Results reviewed, labs MET treatment parameters, ok to proceed with treatment.      Post Infusion Assessment:  Patient tolerated infusion without incident.  Blood return noted pre and post infusion.  Site patent and intact, free from redness, edema or discomfort.  No evidence of extravasations.  Access discontinued per protocol.       Discharge Plan:   Patient declined prescription refills.  Copy of AVS reviewed with patient and/or family.  Patient will return 6/27 for next appointment.  Patient discharged in stable condition accompanied by: .  Departure Mode: Ambulatory.    Loree Ambriz RN

## 2019-06-27 NOTE — PROGRESS NOTES
Infusion Nursing Note:  Odalys Nathan presents today for Taxol, Mag replacement.    Patient seen by provider today: No   present during visit today: Not Applicable.    Note: 2 Gms Mag given for level of 1.2.    Intravenous Access:  Lab draw site R port Needle type michele, Gauge 20.  Labs drawn without difficulty.  Implanted Port.    Treatment Conditions:  Lab Results   Component Value Date    HGB 11.0 06/27/2019     Lab Results   Component Value Date    WBC 5.0 06/27/2019      Lab Results   Component Value Date    ANEU 2.4 06/27/2019     Lab Results   Component Value Date     06/27/2019    Mag 1.2  Potassium  3.5    Results reviewed, labs MET treatment parameters, ok to proceed with treatment.      Post Infusion Assessment:  Patient tolerated infusion without incident.  Blood return noted pre and post infusion.  Site patent and intact, free from redness, edema or discomfort.  No evidence of extravasations.  Access discontinued per protocol.       Discharge Plan:   Patient declined prescription refills.  Patient and/or family verbalized understanding of discharge instructions and all questions answered.  AVS to patient via [employer].  Patient will return 7/3for next appointment.   Patient discharged in stable condition accompanied by: self and .  Departure Mode: Ambulatory.    Keisha Ferguson RN

## 2019-07-03 NOTE — PROGRESS NOTES
3Infusion Nursing Note:  Odalys Nathan presents today for Day 15 Cycle 3 Taxol with pre-medications and 4 grams Magnesium.    Patient seen by provider today: No   present during visit today: Not Applicable.    Note: N/A.    Intravenous Access:  Lab draw site right port, Needle type Méndez, Gauge 20.  Labs drawn without difficulty.  Implanted Port.    Treatment Conditions:  Lab Results   Component Value Date    HGB 11.0 07/03/2019     Lab Results   Component Value Date    WBC 4.2 07/03/2019      Lab Results   Component Value Date    ANEU 2.3 07/03/2019     Lab Results   Component Value Date     07/03/2019      Lab Results   Component Value Date     06/20/2019                   Lab Results   Component Value Date    POTASSIUM 3.4 07/03/2019           Lab Results   Component Value Date    MAG 1.1 07/03/2019            Lab Results   Component Value Date    CR 0.63 06/20/2019                   Lab Results   Component Value Date    JOSE ALBERTO 8.8 06/20/2019                Lab Results   Component Value Date    BILITOTAL 0.5 06/20/2019           Lab Results   Component Value Date    ALBUMIN 3.3 06/20/2019                    Lab Results   Component Value Date    ALT 93 06/20/2019           Lab Results   Component Value Date    AST 50 06/20/2019       Results reviewed, labs MET treatment parameters, ok to proceed with treatment.  Patient given 4 grams of magnesium today.      Post Infusion Assessment:  Patient tolerated infusion without incident.  Blood return noted pre and post infusion.  Site patent and intact, free from redness, edema or discomfort.  No evidence of extravasations.  Access discontinued per protocol.       Discharge Plan:   Patient declined prescription refills.  Discharge instructions reviewed with: Patient and Family.  Patient and/or family verbalized understanding of discharge instructions and all questions answered.  AVS to patient via [employer].  Patient will return 7/11/2019 for next  appointment.   Patient discharged in stable condition accompanied by: .  Departure Mode: Ambulatory.    Justine Fitch RN

## 2019-07-11 NOTE — PROGRESS NOTES
Nursing Note:  Odalys Osmar Nathan presents today for port labs.    Patient seen by provider today: No   present during visit today: Not Applicable.    Note: Heading to hospital for CT scan, then back for chemo.    Intravenous Access:  Labs drawn without difficulty.  Implanted Port.    Discharge Plan:   Patient was sent to CT for scan.    Elly Mcfadden RN

## 2019-07-11 NOTE — PROGRESS NOTES
Infusion Nursing Note:  Odalys Nathan presents today for Taxol/Mag/Potassium.    Patient seen by provider today: No   present during visit today: Not Applicable.    Note: Port accessed in fast track for imaging prior to treatment.  Potassium and Magnesium need replacing.  Fingernails are getting tender.  Patient will plan to start the vinegar/water soaks for her fingernails.    Intravenous Access:  Implanted Port accessed in fast track.    Treatment Conditions:  Lab Results   Component Value Date    HGB 11.1 07/11/2019     Lab Results   Component Value Date    WBC 5.0 07/11/2019      Lab Results   Component Value Date    ANEU 2.2 07/11/2019     Lab Results   Component Value Date     07/11/2019      Results reviewed, labs MET treatment parameters, ok to proceed with treatment.      Post Infusion Assessment:  Patient tolerated infusion without incident.  Blood return noted pre and post infusion.  Site patent and intact, free from redness, edema or discomfort.  No evidence of extravasations.  Access discontinued per protocol.       Discharge Plan:   Discharge instructions reviewed with: Patient.  Patient discharged in stable condition accompanied by: .  Departure Mode: Ambulatory.  Scheduled to see the MD on 7/15/19.    ARNOLD GILLETTE RN

## 2019-07-15 NOTE — PROGRESS NOTES
Infusion Nursing Note:  Odalys Banksandrew Nathan presents today for port labs.     present during visit today: Not Applicable.    Note: N/A.    Intravenous Access:  Lab draw site right port, Needle type michele, Gauge 20.  Labs drawn without difficulty.  Implanted Port.    Treatment Conditions:  NA    Post Lab Assessment:  Patient tolerated blood collection   Site patent and intact, free from redness, edema or discomfort.  No evidence of extravasations.  Access  discontinued    Discharge Plan:   Patient and/or family verbalized understanding of  instructions and all questions answered.  Patient  to lobby in stable condition accompanied by: self and .  Patient to see provider today: No  Departure Mode: Ambulatory.  Keisha Ferguson RN

## 2019-07-15 NOTE — LETTER
7/15/2019         RE: Odalys Nathan  [id number]  [facility] [id number]        Dear Colleague,    Thank you for referring your patient, Odalys Nathan, to the [facility]. Please see a copy of my visit note below.                Follow Up Notes on Referred Patient    Date: July 15, 2019          RE: Odalys Nathan  : 1960  HOLA: July 15, 2019        Odalys Nathan is a 58 year old woman with a diagnosis of recurrent stage IIIC bilateral ovarian cancer. She is here today for follow up and disease management.     Oncology History:  2012 - Admitted to hospital for 2 weeks of intermittent abdominal cramping, distention, diarrhea and N/V. CT of abdomen/pelvis significant for small bowel obstruction, a heterogenous soft tissue density in the pelvis, omental nodules, and ascites. Bilateral adnexal masses per U/S of pelvis. CA-125 was elevated at 987, CEA was normal at 1.0.    12 - Therapeutic paracentesis (4 L) with cytology confirming malignancy (CT positive, weak ER positive, CK-7 positive consistent with GYN primary). Surgery recommended d/t potential for falling blood counts 2/ chemotherapy (patient is Adventism and limiting blood transfusion)    12 - Exploratory laparotomy, MAR BSO, lysis of adhesion, Appendectomy, Repair cystotomy, Omentectomy Narz-uqzaeg-wxtormhcw-transverse-colon resection, Ileo-descending colon anastomosis, CUSA, & Colonoscopy (done by Dr. Amato and colorectal team).  2012 - Admitted to hospital for bilateral pulmonary emboli and drainage of pleural effusion. Started on Lovenox.  12-3/21/12: Cycle #1-2 Carbo/Taxol IV  3/29/12 - Started on Keflex by her PCP for infection in her healing wound (immediately below her umbilicus).    12-12: Cycle #3-6 IV chemo, Cycle #1 IV/IP chemo  12 -  8, CT PRADEEP - enrolled in  - observation arm  3/4/13-13:  6, 9, 12, 15, 20.  7/1/13:  CT Chest, Abdomen, Pelvis IMPRESSION:  1. Worsening metastatic ovarian carcinoma suggested by increased size of soft tissue nodules anterior to the right psoas muscle, that may represent growing mesenteric lymphadenopathy.  2. Remaining prominent right lower quadrant mesenteric lymph nodes are not significantly changed from CT 7/16/2012.  3. Clustered nodular opacities in the right lower lobe are not significant change from 7/16/2012 and remain indeterminate. Again, the appearance and distribution is suggestive of an infectious etiology.  4. Stable 8 mm soft tissue nodule in left breast, unchanged since at least 02/20/2012. This can be observed on followup studies, but correlation with mammography could be considered.  Decision made to start Doxil/Carbo.  7/11/13: The left ventricular ejection fraction is normal at 66.4%.  7/12/13-9/6/13: Cycle #1-3 Doxil/Carbo.  10, 11.    9/30/13 CT C/A/P Impression:  1. Overall, favorable response to treatment with decreasing size of soft tissue nodules tracking along the anterior aspect of the right psoas muscle.  2. 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  3. Improved cluster of right lower lobe pulmonary nodular opacities. These may represent resolving infection.  10/3/13-12/9/13:  9, 8, 10. Cycle 4-6 Doxil/Carbo.    1/13/14 CT C/A/P Impression:   1. Stable appearance of metastatic ovarian cancer. Scattered soft tissue nodules along the anterior aspect of the right psoas muscle are unchanged in size. Mild mesenteric lymphadenopathy is unchanged.  2. Clustered micronodules in the right lower lobe are unchanged from 9/30/2013, but improved from 7/1/2013. This history suggests a postinflammatory/postinfectious etiology.  3. 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  1/16/14 Discussed multiple options for her based on relatively stable-appearing disease on CT but slight increase in  (which has had small increase to 20 with last recurrence) including  chemo break with recheck of  in 1 month, starting new chemo agent immediately, and exploratory surgery with possible resection of nodules. She is considered platinum-sensitive based on > 1 year remission after Taxol/Carbo, which we will take into consideration for future chemo planning. I am not inclined to surgery at this time given difficulty she already has with diarrhea secondary to past colon resection. I suspect we would need to resect further bowel due to mesenteric disease. Also explained inherent risks of any major surgery. Also mentioned maintenance chemo, but this has not been shown to increase overall survival and would likely decrease her quality of life without significant benefit. Family is going on vacation to [place] in 2 weeks and Odalys does not want to have chemo prior to that, so will plan to take 1 month break. She can have  at that time (discussed checking toady since last draw was in early December, but as it would likely not change treatment plan and she has h/o slow rising , will not check today).  2/17/14  16    2/20/14: Decision to take break from chemo for two months, followed by CT and CA-125.    4/21/14  27  4/21/14 CT C/A/P Impression:    1. Increased size of 2 low-attenuation lymph nodes anterior to the right psoas muscle is concerning for worsening metastatic ovarian cancer.    2. New circumferential thickening of a 3.8 cm length segment of distal transverse colon is likely physiologic. Recommend attention on followup imaging.    3. Grossly unchanged size of clustered small nodules versus scarring in the right lower lobe the lungs.    4. Stable thrombosis of the right ovarian vein.  5/14/14: Diagnostic laparoscopy converted to exploratory laparotomy and removal of mesenteric masses, tumor debulking, peritoneal biopsies and intraperitoneal port placement. On laparoscopy, it was noted that there were small nodules on the anterior abdominal wall near the  "previous incision, small nodules on the right pelvic sidewall as well. Nodules were palpated in the mesentery; however, as it was unable to clarify where the origin of the nodules was, the decision was made to open the patient. On opening there was found to be approximately a 3 cm nodule in the small bowel mesentery and another separate approximately 2 cm nodule in the bowel mesentery. Pelvis without evidence of cancer, some mesenteric lymph nodes were palpated. No evidence otherwise of any disseminated cancer throughout the abdomen.    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 (2/2)  -Largest metastasis: 1.3 cm  -See comment  D: Peritoneum, right paracolic gutter #2, biopsy:  -Fibroadipose tissue with granulomatous inflammation surrounding refractile material  -Negative for malignancy   E: Small bowel adhesion, biopsy:  -Fibroconnective tissue, consistent with adhesion  -Negative for malignancy  F: Lymph nodes, mesentry, not otherwise specified, excision:  -Two lymph nodes, negative for metastatic carcinoma (0/2)  G: Lymph node, mesentery, \"#2\", excision:  -One lymph node, negative for metastatic carcinoma (0/1)  H: Lymph nodes, mesentery, \"nodule #2\", excision:  -Five lymph nodes, negative for metastatic carcinoma (0/5)  COMMENT:  Some of the specimens show post-operative changes. Others show possible treatment related changes, including necrosis. The metastatic carcinoma in the mesenteric lymph nodes (specimen C) shows variable morphology, including relatively low grade tumor with papillary architecture, and high grade tumor comprised of nests of tumor cells with " irregular, slit-like spaces and marked nuclear pleomorphism.    5/29/14: Cycle 1 IV PACLitaxel / IP CISplatin / IP PACLitaxel.  - 28.  6/26/14: Cycle #2 IV/IP.  10  8/5/14: CT chest/abd/pelvis IMPRESSION     1. In this patient with ovarian cancer, overall findings are indicative of stable/slight improvement, as multiple mesenteric lymphadenopathy and scattered nodular peritoneal soft tissue mass lesions appear unchanged or slightly smaller since 4/21/2014.    2. 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 Mild dilatation of the second and the third portion of the duodenum with a narrow SMA angle. This could represent SMA syndrome, if clinically correlated.17/14:    Cycle #3 IV/IP.  10.    CT chest/abd/pelvis with contrast on 8/5/14    Impression:    1. In this patient with ovarian cancer, overall findings are indicative of stable/slight improvement, as multiple mesenteric lymphadenopathy and scattered nodular peritoneal soft tissue mass lesions appear unchanged or slightly smaller since 4/21/2014.    2. 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 Mild dilatation of the second and the third portion of the duodenum with a narrow SMA angle. This could represent SMA syndrome, if clinically correlated.  8/7/14: Cycle #4 Taxol/Carbo (changed from IV/IP).  10. She has been feeling okay. She is unsure if she can finish out the course of 6 cycles IV/IP taxol/cisplatin. She feels like she has the flu for about a week then starts feeling gradually better after each chemo cycle. Her spouse notes that she actually has been more sick with the treatments than she initially admits here. She was also previously having some rib pain. Denies any rib pain now. Denies any chest pain or shortness of breath.  Plan: discussed recent CT cap results and switching to just IV as she is feeling miserable with IP treatments. Switch to IV carbo/taxol as patient is platinum sensitive.  We  also discussed her taking part of the tesaro trial, which would require BRCA testing. She would like to take part in this trial if eligible.  8/20/14: Remove Intraperitoneal Port ( Port and catheter intact - discarded)  8/28/14: Cycle #5 Taxol/Carbo held due to thrombocytopenia.  6.    She denies any vaginal bleeding, no changes in her bowel or bladder habits, no nausea/emesis, no lower extremity edema, and no difficulties eating or sleeping. She denies any abdominal discomfort/bloating, no fevers or chills, and no chest pain or shortness of breath. She states her diarrhea is the same. She reports some fatigue which improves about 1-2 weeks after her chemotherapy. She states she does not need any medication refills and she was told she does not meet the criteria for the TESARO trial. She states she has 3 bags of iv fluids left over from her previous chemotherapy and will give these to herself. She states she is ready for her treatment today.    9/29/14: Cycle #6 Taxol/Carbo  6. Insurance questions regarding GSF coverage today. No concerns other than fatigue. Taking iron for anemia and does not desire blood transfusion. Using neulasta for neutropenia. Using home IV hydration if needed. Baseline unchanged. No abdominal bloating, constipation, diarrhea, pain, vaginal or rectal bleeding, cough or dyspnea, fluid retention.    10/16/14: Impression:    1. Nodular peritoneal soft tissue mass in the right lower quadrant adjacent to the psoas muscle is no longer appreciated. Adjacent prominent lymphadenopathy is unchanged from previous exam. No new peritoneal lesions.    2. 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     10/20/14:  5. CT chest/abdomen/pelvis on 10/16/14 showed nodular peritoneal soft tissue mass in the right lower quadrant adjacent to the psoas muscle is no longer appreciated. Adjacent prominent lymphadenopathy is unchanged from previous exam. 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  1/27/15:  6.  4/28/15:  14.  5/26/15:  18.  6/2/15: CT cap Impression:  1. Postsurgical changes of hysterectomy and bilateral salpingo-oophorectomy for ovarian cancer. There is a new 8 mm hazy, ill-defined hypoattenuating lesion in hepatic segment 6 which is suspicious for a metastatic deposit. Further evaluation with ultrasound in recommended.    2. Increased size of a left retroperitoneal lymph node which is indeterminate but may represent a ciro metastasis. Mildly prominent lymph nodes in the right lower quadrant are not significantly changed.  3. Moderate colonic stool burden.    6/4/15: US abdomen IMPRESSION:    Hyperechoic lesion in the right hepatic lobe, consistent with hemangioma. This does not corresponds to the area of the lesion seen on CT from 6/2/2015. An MR would be helpful for identifying and characterizing the lesion from the recent CT.  6/12/15: MR abd IMPRESSION:  1. New 20 x 11 mm enhancing lesions between the right obliques, concerning for metastatic disease. This lesions should be amenable to percutaneous biopsy, if indicated.  2. Correlating to the lesion visualized on comparison CT is a hepatic segment 6 subcapsular 7 mm lesion. Overall the appearance favors the diagnosis of a simple cyst. However, there is faint suggestion of mild peripheral arterial enhancement. Although this is favored as  artifactual, this should be followed up to confirm stability. Recommend 6 month followup.  3. Hepatic segment 6, 5 mm lesion too small to technically characterize. Differential would favor FNH, less likely flash filling hemangioma. Recommend attention on followup.  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      9/1/15: Cycle #1 Avastin/Cytoxan.   31.      9/24/15:feeling generally well. She  says she has been having back and stomach spasms. She is taking cytosine daily (she ran out yesterday). She also says its affecting her voice. Admits that it burns occasionally. She is eating and drinking normal. She also admit diarrhea, 5-7 times daily, lose/watery. She trying to stay hydrated and eat fiber. She also says that her body is sore, especially the bottom of her feet. Her blood pressure is normal. She also admits having headache after her first infusion.       9/24/15: Cycle #2 Avastin/Cytoxan.  19.  10/15/15: Cycle #3 Avastin/Cytoxan.  16.      11/6/15: CT c/a/p IMPRESSION:    1. Stable postoperative change of MAR/BSO for ovarian cancer.  2. The lesion in the right flank abdominal musculature is slightly decreased in size. Otherwise, stable examination.  2. No evidence of metastatic disease in the chest.     11/20/15: Treatment planning visit,  16     11/25/15: surgical pathology report  FINAL DIAGNOSIS:  Soft tissue, right oblique muscle mass, excision:  -Recurrent ovarian serous carcinoma  -Carcinoma is present less than 1 mm from one resection margin  -Background skeletal muscle and fibroadipose tissue     1/4/16:  22  1/11/16-1/27/16: Radiation to right flank x 12 treatments  4/7/2016:  94. CT cap IMPRESSION:    In this patient with ovarian cancer status post MAR/BSO and descending/transverse colectomy:  1. No evidence for malignancy in the chest, abdomen, or pelvis.  2. Stable small hypodense segment 6 liver lesion, appears more likely benign, possibly a cyst.  5/13/16:  124.  6/3/16: PET CT IMPRESSION: In this patient with a history of ovarian cancer:  1. Hypermetabolic and enlarging periaortic and perihepatic lymphadenopathy compatible with metastatic disease, as detailed above.  2. Although hypodense lesion in hepatic segment 6 has been present since 6/2/2015 associated hypermetabolism makes this lesion highly concerning for metastatic disease.     Plan: to  start Niraparib under TESARO study.      6/9/16:  137.  6/14/16: Cycle #1 Niraparib.    6/28/16: Cycle #1 D15 Niraparib.    7/5/16:  100.    7/11/16: Cycle #2 Nraparib.   83.     8/3/2016: PET CT IMPRESSION:    In this patient with known history of ovarian cancer:  1) New pleural based nodular opacities in the lateral and inferior aspects of the bilateral lower lobes, worse in the left lung. Likely infection. Close follow up is recommended.      2) Slight decrease in hypermetabolic abdominal lymphadenopathy. 2 hypermetabolic lymph nodes persist.  3) Unchanged right hepatic lobe metastatic lesion.      8/9/16: Cycle #3 Niraparib.  69.  9/6/16: Cycle #4 Niraparib.  53. Dose held due to anemia.  9/13/16: Eval for potential cycle 4 niraparib. Dose held due to anemia.  10/4/16: CT CAP impression:  IMPRESSION: In this patient with a known history of ovarian cancer:  1. There has been interval resolution of pleural-based nodular  opacities which likely represented infection.  2. Abdominal lymphadenopathy in the form of 2 portacaval lymph nodes  have not significantly changed in size, noted to be hypermetabolic on  prior PET/CT.  3. Previously demonstrated metastatic lesion in the right lobe of the  liver is not significantly changed.  10/11/16:  60  11/1/16: C6 niraparib,  75  11/29/16: C7 niraparib.  78. CT CAP impression as follows:  Target lesions (RECIST criteria):       A previously described target lesion superior to the head of the  pancreas (series 2, image 64)  (referred to as a perihepatic node on  6/3/2016) may not be a valid target lesion because it measured less  than 1.5 cm originally. However, this particular node has decreased in  size, now measuring 7 mm in short axis versus 14 mm on 6/3/2016 when  measured in a similar fashion.       2.3 cm short axis portacaval lymph node on series 2 image 67,  previously 2.0 cm on 10/4/2016.       1.2 cm subtle hypodensity  in hepatic segment 6 on series 2 image 75,  stable on multiple studies since at least 6/3/2016     Sum of diameters today: 3.5 cm. Sum of diameters 10/4/2016: 3.2 cm.  Growth = 9%.    12/27/16: C8 niraparib.  105.  1/25/17: C9 niraparib.  108.  2/23/17: C10 niraparib.  132.   CT CAP impression:  Sum of target lesion diameters today: 3.7 cm. Sum of target lesion  diameters on 11/28/2016: 3.5 cm. Growth= 6%  1. In this patient with history of ovarian cancer there is stable  disease by RECIST criteria as evidenced by:   1a. Mildly increased size of liver metastasis.  1b. Stable portacaval lymphadenopathy.  1c. No evidence of metastatic disease in the chest.  2. Trace emphysematous changes of the lungs.  3/22/17: C11 niraparib.  132.  4/19/17: C12 niraparib.  127.  5/16/17: CT CAP IMPRESSION: In this patient with ovarian cancer:  1. Mildly increased size of hepatic metastasis segment 6 with subtle increased capsular retraction.  2. Stable edmundo hepatis nodes, with mild increase in size of periaortic lymph nodes.  3. Prominent left supraclavicular lymph node with subtle increase in size compared to prior studies, particularly comparing to 10/4/2016.  4. Mild subtle groundglass opacities in the right upper lobe, not present on prior study, lesser extent in the right lower lobe.  Findings may represent infection, additional consideration is malignancy (less likely), and attention on follow-up study  Recommended.  Addendum:   Prominent left supraclavicular lymph node (3/25) is stable from most recent CT performed 2/20/2017, currently measuring 14 x 16 mm, previously 14 x 16 mm on 2/20/2017.      The portal caval lymph node/edmundo hepatis lymph node is stable from 2/20/2017, measuring 21 mm.      Clarification of size of the para-aortic lymph node (series 3 image 327). It short axis measurement is 11 mm versus 9 mm on prior study.      Hepatic segment 6 triangular-shaped low density lesion  (3/362) measures 14 mm, previously measured 12 mm, demonstrating a  possible/questionable minimal subtle increase in size. Similarly the lymph nodes noted above in the supraclavicular and para-aortic regions demonstrate possible minimal possible subtle increase in size.      5/18/17: Cycle #13 Niraparib.  191.  6/15/17: Cycle #14 Niraparib.  146.  7/13/17: Cycle #15 Niraparib 200 mg.  171     CT (8/9/17):       IMPRESSION:  1. Segment 6 hepatic metastasis is stable to minimally increased in size.  2. Slight increase in multicentric adenopathy, most pronounced at the edmundo hepatis. Additional sites include the inferior left neck/supraclavicular region and retroperitoneum which appear stable to  minimally increased.      8/10/17:  175  9/14/17: MUGA LVEF 54%  9/22/17: C1D1 carboplatin/Doxil.  187.  10/19/17: C2D1 carboplatin/Doxil.  108.  11/17/17: C3D1 carboplatin/Doxil.  82.  12/14/17: C4D1 carboplatin/Doxil/avastin.  83.  Of note, avastin held on C4D14  1/12/18: C5D1 carboplatin/Doxil/avastin.  80.  1/26/18: platelets 61, patient was not given avastin due to being jehova's witness.   2/9/18: C6D1 carboplatin/Doxil/Avastin.   71.  3/2/18:  49. Three month treatment break.    6/20/2018:  170. CT CAP:  IMPRESSION: In this patient with history of ovarian cancer:  1. Increased size of a right hepatic lobe lesion and numerous edmundo hepatis and retroperitoneal lymph nodes compatible with progression of metastatic disease.  2. New mild intrahepatic biliary ductal dilatation, periportal edema, and pericholecystic fluid. Increased soft tissue fullness in the edmundo hepatis in the expected location of the common hepatic duct. Findings  are suspicious for developing biliary ductal obstruction secondary to worsening metastatic disease in the edmundo hepatis. Correlation with liver function tests is recommended. Right upper quadrant ultrasound  may be  beneficial.  3. Unchanged left supraclavicular and right hilar lymphadenopathy in the chest.      8/3/18: C1D1 weekly paclitaxel.  not done.  9/20/18: C2D1 weekly paclitaxel 80mg/m2. Ca 125-56  11/8/2018: C3D1 weekly paclitaxel;  26     12/17/18: Ct cap IMPRESSION:  1. New area of lobulated hypodense nodularity at the far-inferior right liver. This raises the possibility of a new area of hepatic metastasis.  2. Conversely, other nodules within the right liver are smaller suggesting improvement.  3. Improved adenopathy at the abdominal retroperitoneum. Improved left  supraclavicular adenopathy. Stable mildly prominent right hilar lymph nodes.  4. Previously noted ill-defined soft tissue at the edmundo hepatis region is still present but appears less prominent in size.  5. New finding of segmental wall thickening of the proximal sigmoid colon with some fluid distention of the adjacent colon. This could represent a segmental colitis. Other etiologies not excluded.    12/20/18:   19.  1/22/19:  45.  3/20/19: CT cap IMPRESSION:  1. Progression of disease with increasing size of a right inferior hepatic mass consistent with metastatic disease.  2. Increasing ill-defined multifocal regions of soft tissue at the edmundo hepatis consistent with malignant adenopathy versus malignant implants. Associated increased intrahepatic biliary ductal dilatation.  3. Other areas of progressive adenopathy noted at the left neck base, mediastinum, and abdominal retroperitoneum.  4. New area of carcinomatosis medial to stomach at the left upper abdomen.    3/20/19: Cycle #1 Weekly Paclitaxel.   180.  5/7/19: Cycle #2 Weekly Paclitaxel.    142.  6/20/19: Cycle #3, Day #1Weekly Paclitaxel/  pending  6/27/2019: C3, D8  7/3/19: C3 D15  7/11/19: C3; D22    7/11/19: CT CAP-IMPRESSION:  1. Slight increased size of the left supraclavicular lymph node.  Slight progression of the mediastinal lymph nodes is also  evident. New  periesophageal node as described above. Retroperitoneal nodes are  stable if not slightly smaller. Some of these nodes appear to have  partially calcified suggesting a response to interval therapy.  2. Interval decreased size of the inferior right hepatic lobe lesion  now with an area of central calcification or enhancement. No new liver  lesions. Remaining abdominal organs are grossly unremarkable. No  significant hydronephrosis.  3. Postop changes involving the bowel and pelvic region. No recurrent  pelvic mass.    Today: Doing ok today but fatigued. Daughter admitted for alcoholism x 1 mos at [facility]. Daughter starting chem dependency today. Significant anxiety and stress re daughter. Had bladder infection with last . Fingernails are pulling away from her fingers, some discomfort with this, numbness in tips of fingers stable. Feels ok now overall, diarrhea stable, eating ok but losing some weight bc  on low carb diet. With stress of daughter was not eating well, was spending a lot of time in the hospital.         Date Value Ref Range Status   06/20/2019 259 (H) 0 - 30 U/mL Final     Comment:     Assay Method:  Chemiluminescence using Siemens Centaur XP   05/07/2019 142 (H) 0 - 30 U/mL Final     Comment:     Assay Method:  Chemiluminescence using Siemens Centaur XP   03/20/2019 180 (H) 0 - 30 U/mL Final     Comment:     Assay Method:  Chemiluminescence using Siemens Centaur XP   03/11/2019 147 (H) 0 - 30 U/mL Final     Comment:     Assay Method:  Chemiluminescence using Siemens Centaur XP   01/22/2019 45 (H) 0 - 30 U/mL Final     Comment:     Assay Method:  Chemiluminescence using Siemens Centaur XP   12/20/2018 19 0 - 30 U/mL Final     Comment:     Assay Method:  Chemiluminescence using Siemens Centaur XP   11/08/2018 26 0 - 30 U/mL Final     Comment:     Assay Method:  Chemiluminescence using Siemens Centaur XP   09/20/2018 56 (H) 0 - 30 U/mL Final     Comment:     Assay Method:   Chemiluminescence using Siemens Centaur XP   06/20/2018 170 (H) 0 - 30 U/mL Final     Comment:     Assay Method:  Chemiluminescence using Siemens Centaur XP   03/02/2018 49 (H) 0 - 30 U/mL Final     Comment:     Assay Method:  Chemiluminescence using Siemens Centaur XP     Lab Results   Component Value Date    WBC 5.0 07/11/2019     Lab Results   Component Value Date    RBC 3.07 07/11/2019     Lab Results   Component Value Date    HGB 11.1 07/11/2019     Lab Results   Component Value Date    HCT 32.9 07/11/2019     Lab Results   Component Value Date     07/11/2019     Lab Results   Component Value Date    MCH 36.2 07/11/2019     Lab Results   Component Value Date    MCHC 33.7 07/11/2019     Lab Results   Component Value Date    RDW 16.6 07/11/2019     Lab Results   Component Value Date     07/11/2019     Mg-1.3  K+-3.3    Review of Systems:    Systemic           no weight changes; no fever; no chills; no night sweats; no appetite changes; + fatigue  Skin           no rashes, or lesions  Eye           no irritation; no changes in vision  Ze-Laryngeal           no dysphagia; no hoarseness   Pulmonary    no cough; no shortness of breath  Cardiovascular    no chest pain; no palpitations  Gastrointestinal    + diarrhea; no constipation; no abdominal pain; no changes in bowel habits; no blood in stool  Genitourinary   no urinary frequency; no urinary urgency; no dysuria; no pain; no abnormal vaginal discharge; no abnormal vaginal bleeding  Breast    no breast discharge; no breast changes; no breast pain  Musculoskeletal    no myalgias; no arthralgias; no back pain  Psychiatric           no depressed mood; no anxiety    Hematologic              no tender lymph nodes; no noticeable swellings or lumps   Endocrine    no hot flashes; no heat/cold intolerance         Neurological   no tremor; no numbness and tingling; no headaches; no difficulty sleeping      Past Medical History:    Past Medical History:    Diagnosis Date     Antiplatelet or antithrombotic long-term use      Ascites      Blood clot in the legs      Diabetes (H)      Ovarian cancer (H)     serous,stg IV     Pleural effusion      Pulmonary embolism (H) 2/2012     Refusal of blood transfusions as patient is Sabianism      Short gut syndrome      Subclinical hypothyroidism 4/18/2013     Thrombosis of leg          Past Surgical History:    Past Surgical History:   Procedure Laterality Date     COLECTOMY       COLONOSCOPY  2/1/2012    Procedure:COLONOSCOPY; With Biopsy; Surgeon:TONI SULLIVAN; Location:[facility]     HYSTERECTOMY TOTAL ABD, RHIANNA SALPINGO-OOPHORECTOMY, NODE DISSECTION, TUMOR DEBULKING, COMBINED  2/1/2012    Procedure:COMBINED HYSTERECTOMY TOTAL ABDOMINAL, BILATERAL SALPINGO-OOPHORECTOMY, NODE DISSECTION, TUMOR DEBULKING;  Exploratory Laparotomy, Total Abdominal Hysterectomy, Bilateral Salpingo-Oophorectomy, appendectomy,lysis of adhesions, ileal, ascending, transverse and splenic flexure resection, ileal descending bowel renanastomosis, incidental cystotomy repair, CUSA procedure and colonoscopy ; [facility]     INSERT PORT PERITONEAL ACCESS  4/3/2012    Procedure:INSERT PORT PERITONEAL ACCESS; Intraperitoneal Port Placement (c-arm); Surgeon:SAMUEL CARRASCO; Location:[facility]     INSERT PORT PERITONEAL ACCESS  5/14/2014    Procedure: INSERT PORT PERITONEAL ACCESS;  Surgeon: Nga Yeung MD;  Location: [facility] OR     INSERT PORT VASCULAR ACCESS       LAPAROSCOPY DIAGNOSTIC (GYN)  5/14/2014    Procedure: LAPAROSCOPY DIAGNOSTIC (GYN);  Surgeon: Nga Yeung MD;  Location: [facility]     LAPAROTOMY EXPLORATORY Right 11/25/2015    Procedure: LAPAROTOMY EXPLORATORY;  Surgeon: Nga Yeung MD;  Location: [facility]     LAPAROTOMY, TUMOR DEBULKING, COMBINED  5/14/2014    Procedure: COMBINED LAPAROTOMY, TUMOR DEBULKING;  Surgeon: Nga Yeung MD;  Location: [facility]     REMOVE CATHETER PERITONEAL N/A 8/20/2014    Procedure: REMOVE  CATHETER PERITONEAL;  Surgeon: Nga Yeung MD;  Location: [facility]     VASCULAR SURGERY      stent left iliac vein         Health Maintenance Due   Topic Date Due     ADVANCE CARE PLANNING  1960     HIV SCREENING  08/03/1973     DTAP/TDAP/TD IMMUNIZATION (1 - Tdap) 08/03/1983     LIPID  08/03/2003     ZOSTER IMMUNIZATION (1 of 2) 08/03/2008     MAMMO SCREENING  02/01/2015     PAP  04/04/2016     PHQ-2  01/01/2019       Current Medications:     Current Outpatient Medications   Medication Sig Dispense Refill     albuterol (PROAIR HFA/PROVENTIL HFA/VENTOLIN HFA) 108 (90 Base) MCG/ACT inhaler Inhale 2 puffs into the lungs every 6 hours as needed for shortness of breath / dyspnea or wheezing (Patient not taking: Reported on 7/11/2019) 1 Inhaler 1     Ascorbic Acid (VITAMIN C PO) Take 500 mg by mouth daily        Calcium Carbonate-Vitamin D (CALCIUM + D PO) Take 1 tablet by mouth daily.       cyanocobalamin (VITAMIN B12) 1000 MCG/ML injection Inject 1 mL (1,000 mcg) into the muscle every 30 days 1 mL 11     diphenoxylate-atropine (LOMOTIL) 2.5-0.025 MG per tablet Take 2 tablets by mouth 4 times daily as needed for diarrhea 60 tablet 0     Ferrous Sulfate 324 (65 Fe) MG TBEC TAKE ONE TABLET BY MOUTH THREE TIMES DAILY WITH MEALS. TAKE WITH A SMALL AMOUNT OF ORANGE JUICE. DO NOT TAKE WITH CALCIUM 90 tablet 11     HEMP OIL OR EXTRACT OR OTHER CBD CANNABINOID, NOT MEDICAL CANNABIS,        HERBALS daily        iohexol (OMNIPAQUE) 140 MG/ML solution for oral use Mix entire bottle (50ml) of contast with 600ml (20 ounces) of water and drink half 2 hrs prior to CT scan and half 1 hr prior to scan 140 mL 0     LEVOTHYROXINE SODIUM PO Take by mouth daily        loperamide (IMODIUM) 2 MG capsule Take 2 mg by mouth 4 times daily as needed for diarrhea       LORazepam (ATIVAN) 1 MG tablet Take 1 tablet (1 mg) by mouth every 6 hours as needed (Anxiety, Nausea/Vomiting or Sleep) 30 tablet 2     magnesium oxide (MAG-OX) 400 MG  "tablet Take 1 tablet (400 mg) by mouth 2 times daily 90 tablet 3     omeprazole (PRILOSEC) 20 MG DR capsule Take 1 capsule (20 mg) by mouth daily. 30 capsule 4     order for DME Injection Supplies for Vitamin B12: 3cc syringes w/ 27 gauge needles, 1/2 inch length 3 each 0     potassium chloride (KLOR-CON) 20 MEQ packet Take 20 mEq by mouth daily       VITAMIN E NATURAL PO Take 100 Units by mouth daily           Allergies:      No Known Allergies     Social History:     Social History     Tobacco Use     Smoking status: Former Smoker     Years: 8.00     Types: Cigarettes     Last attempt to quit: 1980     Years since quittin.0     Smokeless tobacco: Never Used     Tobacco comment: started at 9 yo and quit at 18 yo   Substance Use Topics     Alcohol use: Yes     Comment: 3x/day wine or jodi       History   Drug Use No         Family History:     The patient's family history is notable for:    Family History   Problem Relation Age of Onset     Cancer Mother 69        lung, smoker     Cancer Maternal Uncle 65        brain     Colon Cancer Maternal Aunt 80        colon         Physical Exam:     /65   Pulse 94   Temp 97  F (36.1  C) (Tympanic)   Resp 16   Ht 1.651 m (5' 5\")   Wt 61.3 kg (135 lb 2 oz)   SpO2 98%   BMI 22.49 kg/m     Body mass index is 22.49 kg/m .    General Appearance: healthy and alert, no distress     HEENT: no thyromegaly, no palpable nodules or masses        Cardiovascular: regular rate and rhythm, no gallops, rubs or murmurs     Respiratory: lungs clear, no rales, rhonchi or wheezes, normal diaphragmatic excursion    Musculoskeletal: extremities non tender and without edema    Skin: no lesions or rashes     Neurological: normal gait, no gross defects     Psychiatric: appropriate mood and affect                               Hematological: normal cervical, supraclavicular lymph nodes     Gastrointestinal:       abdomen soft, non-tender, non-distended, right sided fullness in " abdomen, non-tender    Genitourinary: Deferred      Assessment:    Odalys Nathan is a 58 year old woman with a diagnosis of recurrent stage IIIC bilateral ovarian cancer. She is here today for follow up and disease management. Imaging suggests slight increase in size of lymph nodes.    25 minutes were spent with this patient, over 50% of that time was spent in symptom management, treatment planning and in counseling and coordination of care.      Plan:     1.)         Recurrent Stage IIIC ovarian cancer, CT shows mixed response but  was not drawn today. Would like to see the  prior to deciding if will continue on the weekly taxol. Will have week off this week.  Reviewed signs and symptoms for when she should contact the clinic or seek additional care. Patient to contact the clinic with any questions or concerns in the interim.  Could consider olaparib vs. Adding avastin to weekly taxol.   -PDL-1 is 0%  -Foundation one testing does not show targetable agent.     2.) Genetic risk factors were assessed and she is negative for mutations in BRCA1, BRCA2, EPCAM, MLH1, MSH2, MSH6, PMS2, PTEN, and TP53 genes.     3.) Labs and/or tests ordered include:  Chemo labs prior to chemo. , mg and bmp today.      4.) Health maintenance issues addressed today include annual health maintenance and non-gynecologic issues with PCP.    5.)        Continue to take K and Mg bid at home; to be replaced in infusion as needed.     Nga Yeung MD    Department of Ob/Gyn and Women's Health  Division of Gynecologic Oncology  [facility]  [phone]          CC  Patient Care Team:  Aubrie Hester MD as PCP - General  Tammy Flores RN as Continuity Care Coordinator (Oncology)  Nga Yeung MD as MD (Oncology)  Patricia Rocha APRN CNP as Nurse Practitioner (Nurse Practitioner)      Again, thank you for allowing me to participate in the care of your patient.         Sincerely,        Nga Yeung MD

## 2019-07-15 NOTE — PROGRESS NOTES
Follow Up Notes on Referred Patient    Date: July 15, 2019          RE: Odalys Nathan  : 1960  HOLA: July 15, 2019        Odalys Nathan is a 58 year old woman with a diagnosis of recurrent stage IIIC bilateral ovarian cancer. She is here today for follow up and disease management.     Oncology History:  2012 - Admitted to hospital for 2 weeks of intermittent abdominal cramping, distention, diarrhea and N/V. CT of abdomen/pelvis significant for small bowel obstruction, a heterogenous soft tissue density in the pelvis, omental nodules, and ascites. Bilateral adnexal masses per U/S of pelvis. CA-125 was elevated at 987, CEA was normal at 1.0.    12 - Therapeutic paracentesis (4 L) with cytology confirming malignancy (OR positive, weak ER positive, CK-7 positive consistent with GYN primary). Surgery recommended d/t potential for falling blood counts 2/ chemotherapy (patient is Zoroastrian and limiting blood transfusion)    12 - Exploratory laparotomy, MAR BSO, lysis of adhesion, Appendectomy, Repair cystotomy, Omentectomy Ooav-xrlizp-msnalzcyx-transverse-colon resection, Ileo-descending colon anastomosis, CUSA, & Colonoscopy (done by Dr. Amato and colorectal team).  2012 - Admitted to hospital for bilateral pulmonary emboli and drainage of pleural effusion. Started on Lovenox.  12-3/21/12: Cycle #1-2 Carbo/Taxol IV  3/29/12 - Started on Keflex by her PCP for infection in her healing wound (immediately below her umbilicus).    12-12: Cycle #3-6 IV chemo, Cycle #1 IV/IP chemo  12 -  8, CT PRADEEP - enrolled in  - observation arm  3/4/13-13:  6, 9, 12, 15, 20.  13: CT Chest, Abdomen, Pelvis IMPRESSION:  1. Worsening metastatic ovarian carcinoma suggested by increased size of soft tissue nodules anterior to the right psoas muscle, that may represent growing mesenteric lymphadenopathy.  2. Remaining prominent right lower  quadrant mesenteric lymph nodes are not significantly changed from CT 7/16/2012.  3. Clustered nodular opacities in the right lower lobe are not significant change from 7/16/2012 and remain indeterminate. Again, the appearance and distribution is suggestive of an infectious etiology.  4. Stable 8 mm soft tissue nodule in left breast, unchanged since at least 02/20/2012. This can be observed on followup studies, but correlation with mammography could be considered.  Decision made to start Doxil/Carbo.  7/11/13: The left ventricular ejection fraction is normal at 66.4%.  7/12/13-9/6/13: Cycle #1-3 Doxil/Carbo.  10, 11.    9/30/13 CT C/A/P Impression:  1. Overall, favorable response to treatment with decreasing size of soft tissue nodules tracking along the anterior aspect of the right psoas muscle.  2. 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  3. Improved cluster of right lower lobe pulmonary nodular opacities. These may represent resolving infection.  10/3/13-12/9/13:  9, 8, 10. Cycle 4-6 Doxil/Carbo.    1/13/14 CT C/A/P Impression:   1. Stable appearance of metastatic ovarian cancer. Scattered soft tissue nodules along the anterior aspect of the right psoas muscle are unchanged in size. Mild mesenteric lymphadenopathy is unchanged.  2. Clustered micronodules in the right lower lobe are unchanged from 9/30/2013, but improved from 7/1/2013. This history suggests a postinflammatory/postinfectious etiology.  3. 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  1/16/14 Discussed multiple options for her based on relatively stable-appearing disease on CT but slight increase in  (which has had small increase to 20 with last recurrence) including chemo break with recheck of  in 1 month, starting new chemo agent immediately, and exploratory surgery with possible resection of nodules. She is considered platinum-sensitive based on > 1 year remission after Taxol/Carbo, which we will take into  consideration for future chemo planning. I am not inclined to surgery at this time given difficulty she already has with diarrhea secondary to past colon resection. I suspect we would need to resect further bowel due to mesenteric disease. Also explained inherent risks of any major surgery. Also mentioned maintenance chemo, but this has not been shown to increase overall survival and would likely decrease her quality of life without significant benefit. Family is going on vacation to [place] in 2 weeks and Odalys does not want to have chemo prior to that, so will plan to take 1 month break. She can have  at that time (discussed checking toady since last draw was in early December, but as it would likely not change treatment plan and she has h/o slow rising , will not check today).  2/17/14  16    2/20/14: Decision to take break from chemo for two months, followed by CT and CA-125.    4/21/14  27  4/21/14 CT C/A/P Impression:    1. Increased size of 2 low-attenuation lymph nodes anterior to the right psoas muscle is concerning for worsening metastatic ovarian cancer.    2. New circumferential thickening of a 3.8 cm length segment of distal transverse colon is likely physiologic. Recommend attention on followup imaging.    3. Grossly unchanged size of clustered small nodules versus scarring in the right lower lobe the lungs.    4. Stable thrombosis of the right ovarian vein.  5/14/14: Diagnostic laparoscopy converted to exploratory laparotomy and removal of mesenteric masses, tumor debulking, peritoneal biopsies and intraperitoneal port placement. On laparoscopy, it was noted that there were small nodules on the anterior abdominal wall near the previous incision, small nodules on the right pelvic sidewall as well. Nodules were palpated in the mesentery; however, as it was unable to clarify where the origin of the nodules was, the decision was made to open the patient. On opening there was found  "to be approximately a 3 cm nodule in the small bowel mesentery and another separate approximately 2 cm nodule in the bowel mesentery. Pelvis without evidence of cancer, some mesenteric lymph nodes were palpated. No evidence otherwise of any disseminated cancer throughout the abdomen.    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 (2/2)  -Largest metastasis: 1.3 cm  -See comment  D: Peritoneum, right paracolic gutter #2, biopsy:  -Fibroadipose tissue with granulomatous inflammation surrounding refractile material  -Negative for malignancy   E: Small bowel adhesion, biopsy:  -Fibroconnective tissue, consistent with adhesion  -Negative for malignancy  F: Lymph nodes, mesentry, not otherwise specified, excision:  -Two lymph nodes, negative for metastatic carcinoma (0/2)  G: Lymph node, mesentery, \"#2\", excision:  -One lymph node, negative for metastatic carcinoma (0/1)  H: Lymph nodes, mesentery, \"nodule #2\", excision:  -Five lymph nodes, negative for metastatic carcinoma (0/5)  COMMENT:  Some of the specimens show post-operative changes. Others show possible treatment related changes, including necrosis. The metastatic carcinoma in the mesenteric lymph nodes (specimen C) shows variable morphology, including relatively low grade tumor with papillary architecture, and high grade tumor comprised of nests of tumor cells with irregular, slit-like spaces and marked nuclear pleomorphism.    5/29/14: Cycle 1 IV PACLitaxel / IP CISplatin / IP PACLitaxel.  - 28.  6/26/14: Cycle #2 IV/IP.  10  8/5/14: CT chest/abd/pelvis IMPRESSION     1. In this patient with ovarian cancer, " overall findings are indicative of stable/slight improvement, as multiple mesenteric lymphadenopathy and scattered nodular peritoneal soft tissue mass lesions appear unchanged or slightly smaller since 4/21/2014.    2. 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 Mild dilatation of the second and the third portion of the duodenum with a narrow SMA angle. This could represent SMA syndrome, if clinically correlated.17/14:    Cycle #3 IV/IP.  10.    CT chest/abd/pelvis with contrast on 8/5/14    Impression:    1. In this patient with ovarian cancer, overall findings are indicative of stable/slight improvement, as multiple mesenteric lymphadenopathy and scattered nodular peritoneal soft tissue mass lesions appear unchanged or slightly smaller since 4/21/2014.    2. 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 Mild dilatation of the second and the third portion of the duodenum with a narrow SMA angle. This could represent SMA syndrome, if clinically correlated.  8/7/14: Cycle #4 Taxol/Carbo (changed from IV/IP).  10. She has been feeling okay. She is unsure if she can finish out the course of 6 cycles IV/IP taxol/cisplatin. She feels like she has the flu for about a week then starts feeling gradually better after each chemo cycle. Her spouse notes that she actually has been more sick with the treatments than she initially admits here. She was also previously having some rib pain. Denies any rib pain now. Denies any chest pain or shortness of breath.  Plan: discussed recent CT cap results and switching to just IV as she is feeling miserable with IP treatments. Switch to IV carbo/taxol as patient is platinum sensitive.  We also discussed her taking part of the tesaro trial, which would require BRCA testing. She would like to take part in this trial if eligible.  8/20/14: Remove Intraperitoneal Port ( Port and catheter intact - discarded)  8/28/14: Cycle #5 Taxol/Carbo held due to  thrombocytopenia.  6.    She denies any vaginal bleeding, no changes in her bowel or bladder habits, no nausea/emesis, no lower extremity edema, and no difficulties eating or sleeping. She denies any abdominal discomfort/bloating, no fevers or chills, and no chest pain or shortness of breath. She states her diarrhea is the same. She reports some fatigue which improves about 1-2 weeks after her chemotherapy. She states she does not need any medication refills and she was told she does not meet the criteria for the TESARO trial. She states she has 3 bags of iv fluids left over from her previous chemotherapy and will give these to herself. She states she is ready for her treatment today.    9/29/14: Cycle #6 Taxol/Carbo  6. Insurance questions regarding GSF coverage today. No concerns other than fatigue. Taking iron for anemia and does not desire blood transfusion. Using neulasta for neutropenia. Using home IV hydration if needed. Baseline unchanged. No abdominal bloating, constipation, diarrhea, pain, vaginal or rectal bleeding, cough or dyspnea, fluid retention.    10/16/14: Impression:    1. Nodular peritoneal soft tissue mass in the right lower quadrant adjacent to the psoas muscle is no longer appreciated. Adjacent prominent lymphadenopathy is unchanged from previous exam. No new peritoneal lesions.    2. 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     10/20/14:  5. CT chest/abdomen/pelvis on 10/16/14 showed nodular peritoneal soft tissue mass in the right lower quadrant adjacent to the psoas muscle is no longer appreciated. Adjacent prominent lymphadenopathy is unchanged from previous exam. 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  1/27/15:  6.  4/28/15:  14.  5/26/15:  18.  6/2/15: CT cap Impression:  1. Postsurgical changes of hysterectomy and bilateral salpingo-oophorectomy for ovarian cancer. There is a new 8 mm hazy,  ill-defined hypoattenuating lesion in hepatic segment 6 which is suspicious for a metastatic deposit. Further evaluation with ultrasound in recommended.    2. Increased size of a left retroperitoneal lymph node which is indeterminate but may represent a ciro metastasis. Mildly prominent lymph nodes in the right lower quadrant are not significantly changed.  3. Moderate colonic stool burden.    6/4/15: US abdomen IMPRESSION:    Hyperechoic lesion in the right hepatic lobe, consistent with hemangioma. This does not corresponds to the area of the lesion seen on CT from 6/2/2015. An MR would be helpful for identifying and characterizing the lesion from the recent CT.  6/12/15: MR abd IMPRESSION:  1. New 20 x 11 mm enhancing lesions between the right obliques, concerning for metastatic disease. This lesions should be amenable to percutaneous biopsy, if indicated.  2. Correlating to the lesion visualized on comparison CT is a hepatic segment 6 subcapsular 7 mm lesion. Overall the appearance favors the diagnosis of a simple cyst. However, there is faint suggestion of mild peripheral arterial enhancement. Although this is favored as  artifactual, this should be followed up to confirm stability. Recommend 6 month followup.  3. Hepatic segment 6, 5 mm lesion too small to technically characterize. Differential would favor FNH, less likely flash filling hemangioma. Recommend attention on followup.  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      9/1/15: Cycle #1 Avastin/Cytoxan.   31.      9/24/15:feeling generally well. She says she has been having back and stomach spasms. She is taking cytosine daily (she ran out yesterday). She also says its affecting her voice. Admits that it burns occasionally. She is eating and drinking normal. She also admit diarrhea, 5-7 times daily, lose/watery.  She trying to stay hydrated and eat fiber. She also says that her body is sore, especially the bottom of her feet. Her blood pressure is normal. She also admits having headache after her first infusion.       9/24/15: Cycle #2 Avastin/Cytoxan.  19.  10/15/15: Cycle #3 Avastin/Cytoxan.  16.      11/6/15: CT c/a/p IMPRESSION:    1. Stable postoperative change of MAR/BSO for ovarian cancer.  2. The lesion in the right flank abdominal musculature is slightly decreased in size. Otherwise, stable examination.  2. No evidence of metastatic disease in the chest.     11/20/15: Treatment planning visit,  16     11/25/15: surgical pathology report  FINAL DIAGNOSIS:  Soft tissue, right oblique muscle mass, excision:  -Recurrent ovarian serous carcinoma  -Carcinoma is present less than 1 mm from one resection margin  -Background skeletal muscle and fibroadipose tissue     1/4/16:  22  1/11/16-1/27/16: Radiation to right flank x 12 treatments  4/7/2016:  94. CT cap IMPRESSION:    In this patient with ovarian cancer status post MAR/BSO and descending/transverse colectomy:  1. No evidence for malignancy in the chest, abdomen, or pelvis.  2. Stable small hypodense segment 6 liver lesion, appears more likely benign, possibly a cyst.  5/13/16:  124.  6/3/16: PET CT IMPRESSION: In this patient with a history of ovarian cancer:  1. Hypermetabolic and enlarging periaortic and perihepatic lymphadenopathy compatible with metastatic disease, as detailed above.  2. Although hypodense lesion in hepatic segment 6 has been present since 6/2/2015 associated hypermetabolism makes this lesion highly concerning for metastatic disease.     Plan: to start Niraparib under TESARO study.      6/9/16:  137.  6/14/16: Cycle #1 Niraparib.    6/28/16: Cycle #1 D15 Niraparib.    7/5/16:  100.    7/11/16: Cycle #2 Nraparib.   83.     8/3/2016: PET CT IMPRESSION:    In this patient with known history of  ovarian cancer:  1) New pleural based nodular opacities in the lateral and inferior aspects of the bilateral lower lobes, worse in the left lung. Likely infection. Close follow up is recommended.      2) Slight decrease in hypermetabolic abdominal lymphadenopathy. 2 hypermetabolic lymph nodes persist.  3) Unchanged right hepatic lobe metastatic lesion.      8/9/16: Cycle #3 Niraparib.  69.  9/6/16: Cycle #4 Niraparib.  53. Dose held due to anemia.  9/13/16: Eval for potential cycle 4 niraparib. Dose held due to anemia.  10/4/16: CT CAP impression:  IMPRESSION: In this patient with a known history of ovarian cancer:  1. There has been interval resolution of pleural-based nodular  opacities which likely represented infection.  2. Abdominal lymphadenopathy in the form of 2 portacaval lymph nodes  have not significantly changed in size, noted to be hypermetabolic on  prior PET/CT.  3. Previously demonstrated metastatic lesion in the right lobe of the  liver is not significantly changed.  10/11/16:  60  11/1/16: C6 niraparib,  75  11/29/16: C7 niraparib.  78. CT CAP impression as follows:  Target lesions (RECIST criteria):       A previously described target lesion superior to the head of the  pancreas (series 2, image 64)  (referred to as a perihepatic node on  6/3/2016) may not be a valid target lesion because it measured less  than 1.5 cm originally. However, this particular node has decreased in  size, now measuring 7 mm in short axis versus 14 mm on 6/3/2016 when  measured in a similar fashion.       2.3 cm short axis portacaval lymph node on series 2 image 67,  previously 2.0 cm on 10/4/2016.       1.2 cm subtle hypodensity in hepatic segment 6 on series 2 image 75,  stable on multiple studies since at least 6/3/2016     Sum of diameters today: 3.5 cm. Sum of diameters 10/4/2016: 3.2 cm.  Growth = 9%.    12/27/16: C8 niraparib.  105.  1/25/17: C9 niraparib.  108.  2/23/17:  C10 niraparib.  132.   CT CAP impression:  Sum of target lesion diameters today: 3.7 cm. Sum of target lesion  diameters on 11/28/2016: 3.5 cm. Growth= 6%  1. In this patient with history of ovarian cancer there is stable  disease by RECIST criteria as evidenced by:   1a. Mildly increased size of liver metastasis.  1b. Stable portacaval lymphadenopathy.  1c. No evidence of metastatic disease in the chest.  2. Trace emphysematous changes of the lungs.  3/22/17: C11 niraparib.  132.  4/19/17: C12 niraparib.  127.  5/16/17: CT CAP IMPRESSION: In this patient with ovarian cancer:  1. Mildly increased size of hepatic metastasis segment 6 with subtle increased capsular retraction.  2. Stable edmundo hepatis nodes, with mild increase in size of periaortic lymph nodes.  3. Prominent left supraclavicular lymph node with subtle increase in size compared to prior studies, particularly comparing to 10/4/2016.  4. Mild subtle groundglass opacities in the right upper lobe, not present on prior study, lesser extent in the right lower lobe.  Findings may represent infection, additional consideration is malignancy (less likely), and attention on follow-up study  Recommended.  Addendum:   Prominent left supraclavicular lymph node (3/25) is stable from most recent CT performed 2/20/2017, currently measuring 14 x 16 mm, previously 14 x 16 mm on 2/20/2017.      The portal caval lymph node/edmundo hepatis lymph node is stable from 2/20/2017, measuring 21 mm.      Clarification of size of the para-aortic lymph node (series 3 image 327). It short axis measurement is 11 mm versus 9 mm on prior study.      Hepatic segment 6 triangular-shaped low density lesion (3/362) measures 14 mm, previously measured 12 mm, demonstrating a  possible/questionable minimal subtle increase in size. Similarly the lymph nodes noted above in the supraclavicular and para-aortic regions demonstrate possible minimal possible subtle increase in  size.      5/18/17: Cycle #13 Niraparib.  191.  6/15/17: Cycle #14 Niraparib.  146.  7/13/17: Cycle #15 Niraparib 200 mg.  171     CT (8/9/17):       IMPRESSION:  1. Segment 6 hepatic metastasis is stable to minimally increased in size.  2. Slight increase in multicentric adenopathy, most pronounced at the edmundo hepatis. Additional sites include the inferior left neck/supraclavicular region and retroperitoneum which appear stable to  minimally increased.      8/10/17:  175  9/14/17: MUGA LVEF 54%  9/22/17: C1D1 carboplatin/Doxil.  187.  10/19/17: C2D1 carboplatin/Doxil.  108.  11/17/17: C3D1 carboplatin/Doxil.  82.  12/14/17: C4D1 carboplatin/Doxil/avastin.  83.  Of note, avastin held on C4D14  1/12/18: C5D1 carboplatin/Doxil/avastin.  80.  1/26/18: platelets 61, patient was not given avastin due to being jehova's witness.   2/9/18: C6D1 carboplatin/Doxil/Avastin.   71.  3/2/18:  49. Three month treatment break.    6/20/2018:  170. CT CAP:  IMPRESSION: In this patient with history of ovarian cancer:  1. Increased size of a right hepatic lobe lesion and numerous edmundo hepatis and retroperitoneal lymph nodes compatible with progression of metastatic disease.  2. New mild intrahepatic biliary ductal dilatation, periportal edema, and pericholecystic fluid. Increased soft tissue fullness in the edmundo hepatis in the expected location of the common hepatic duct. Findings  are suspicious for developing biliary ductal obstruction secondary to worsening metastatic disease in the edmundo hepatis. Correlation with liver function tests is recommended. Right upper quadrant ultrasound  may be beneficial.  3. Unchanged left supraclavicular and right hilar lymphadenopathy in the chest.      8/3/18: C1D1 weekly paclitaxel.  not done.  9/20/18: C2D1 weekly paclitaxel 80mg/m2. Ca 125-56  11/8/2018: C3D1 weekly paclitaxel;  26     12/17/18: Ct cap  IMPRESSION:  1. New area of lobulated hypodense nodularity at the far-inferior right liver. This raises the possibility of a new area of hepatic metastasis.  2. Conversely, other nodules within the right liver are smaller suggesting improvement.  3. Improved adenopathy at the abdominal retroperitoneum. Improved left  supraclavicular adenopathy. Stable mildly prominent right hilar lymph nodes.  4. Previously noted ill-defined soft tissue at the edmundo hepatis region is still present but appears less prominent in size.  5. New finding of segmental wall thickening of the proximal sigmoid colon with some fluid distention of the adjacent colon. This could represent a segmental colitis. Other etiologies not excluded.    12/20/18:   19.  1/22/19:  45.  3/20/19: CT cap IMPRESSION:  1. Progression of disease with increasing size of a right inferior hepatic mass consistent with metastatic disease.  2. Increasing ill-defined multifocal regions of soft tissue at the edmundo hepatis consistent with malignant adenopathy versus malignant implants. Associated increased intrahepatic biliary ductal dilatation.  3. Other areas of progressive adenopathy noted at the left neck base, mediastinum, and abdominal retroperitoneum.  4. New area of carcinomatosis medial to stomach at the left upper abdomen.    3/20/19: Cycle #1 Weekly Paclitaxel.   180.  5/7/19: Cycle #2 Weekly Paclitaxel.    142.  6/20/19: Cycle #3, Day #1Weekly Paclitaxel/  pending  6/27/2019: C3, D8  7/3/19: C3 D15  7/11/19: C3; D22    7/11/19: CT CAP-IMPRESSION:  1. Slight increased size of the left supraclavicular lymph node.  Slight progression of the mediastinal lymph nodes is also evident. New  periesophageal node as described above. Retroperitoneal nodes are  stable if not slightly smaller. Some of these nodes appear to have  partially calcified suggesting a response to interval therapy.  2. Interval decreased size of the inferior right  hepatic lobe lesion  now with an area of central calcification or enhancement. No new liver  lesions. Remaining abdominal organs are grossly unremarkable. No  significant hydronephrosis.  3. Postop changes involving the bowel and pelvic region. No recurrent  pelvic mass.    Today: Doing ok today but fatigued. Daughter admitted for alcoholism x 1 mos at [facility]. Daughter starting chem dependency today. Significant anxiety and stress re daughter. Had bladder infection with last . Fingernails are pulling away from her fingers, some discomfort with this, numbness in tips of fingers stable. Feels ok now overall, diarrhea stable, eating ok but losing some weight bc  on low carb diet. With stress of daughter was not eating well, was spending a lot of time in the hospital.         Date Value Ref Range Status   06/20/2019 259 (H) 0 - 30 U/mL Final     Comment:     Assay Method:  Chemiluminescence using Siemens Centaur XP   05/07/2019 142 (H) 0 - 30 U/mL Final     Comment:     Assay Method:  Chemiluminescence using Siemens Centaur XP   03/20/2019 180 (H) 0 - 30 U/mL Final     Comment:     Assay Method:  Chemiluminescence using Siemens Centaur XP   03/11/2019 147 (H) 0 - 30 U/mL Final     Comment:     Assay Method:  Chemiluminescence using Siemens Centaur XP   01/22/2019 45 (H) 0 - 30 U/mL Final     Comment:     Assay Method:  Chemiluminescence using Siemens Centaur XP   12/20/2018 19 0 - 30 U/mL Final     Comment:     Assay Method:  Chemiluminescence using Siemens Centaur XP   11/08/2018 26 0 - 30 U/mL Final     Comment:     Assay Method:  Chemiluminescence using Siemens Centaur XP   09/20/2018 56 (H) 0 - 30 U/mL Final     Comment:     Assay Method:  Chemiluminescence using Siemens Centaur XP   06/20/2018 170 (H) 0 - 30 U/mL Final     Comment:     Assay Method:  Chemiluminescence using Siemens Centaur XP   03/02/2018 49 (H) 0 - 30 U/mL Final     Comment:     Assay Method:  Chemiluminescence using Siemens  Centaur XP     Lab Results   Component Value Date    WBC 5.0 07/11/2019     Lab Results   Component Value Date    RBC 3.07 07/11/2019     Lab Results   Component Value Date    HGB 11.1 07/11/2019     Lab Results   Component Value Date    HCT 32.9 07/11/2019     Lab Results   Component Value Date     07/11/2019     Lab Results   Component Value Date    MCH 36.2 07/11/2019     Lab Results   Component Value Date    MCHC 33.7 07/11/2019     Lab Results   Component Value Date    RDW 16.6 07/11/2019     Lab Results   Component Value Date     07/11/2019     Mg-1.3  K+-3.3    Review of Systems:    Systemic           no weight changes; no fever; no chills; no night sweats; no appetite changes; + fatigue  Skin           no rashes, or lesions  Eye           no irritation; no changes in vision  Ze-Laryngeal           no dysphagia; no hoarseness   Pulmonary    no cough; no shortness of breath  Cardiovascular    no chest pain; no palpitations  Gastrointestinal    + diarrhea; no constipation; no abdominal pain; no changes in bowel habits; no blood in stool  Genitourinary   no urinary frequency; no urinary urgency; no dysuria; no pain; no abnormal vaginal discharge; no abnormal vaginal bleeding  Breast    no breast discharge; no breast changes; no breast pain  Musculoskeletal    no myalgias; no arthralgias; no back pain  Psychiatric           no depressed mood; no anxiety    Hematologic              no tender lymph nodes; no noticeable swellings or lumps   Endocrine    no hot flashes; no heat/cold intolerance         Neurological   no tremor; no numbness and tingling; no headaches; no difficulty sleeping      Past Medical History:    Past Medical History:   Diagnosis Date     Antiplatelet or antithrombotic long-term use      Ascites      Blood clot in the legs      Diabetes (H)      Ovarian cancer (H)     serous,stg IV     Pleural effusion      Pulmonary embolism (H) 2/2012     Refusal of blood transfusions as  patient is Yarsanism      Short gut syndrome      Subclinical hypothyroidism 4/18/2013     Thrombosis of leg          Past Surgical History:    Past Surgical History:   Procedure Laterality Date     COLECTOMY       COLONOSCOPY  2/1/2012    Procedure:COLONOSCOPY; With Biopsy; Surgeon:TONI SULLIVAN; Location:[facility]     HYSTERECTOMY TOTAL ABD, RHIANNA SALPINGO-OOPHORECTOMY, NODE DISSECTION, TUMOR DEBULKING, COMBINED  2/1/2012    Procedure:COMBINED HYSTERECTOMY TOTAL ABDOMINAL, BILATERAL SALPINGO-OOPHORECTOMY, NODE DISSECTION, TUMOR DEBULKING;  Exploratory Laparotomy, Total Abdominal Hysterectomy, Bilateral Salpingo-Oophorectomy, appendectomy,lysis of adhesions, ileal, ascending, transverse and splenic flexure resection, ileal descending bowel renanastomosis, incidental cystotomy repair, CUSA procedure and colonoscopy ; [facility]     INSERT PORT PERITONEAL ACCESS  4/3/2012    Procedure:INSERT PORT PERITONEAL ACCESS; Intraperitoneal Port Placement (c-arm); Surgeon:SAMUEL CARRASCO; Location:[facility]     INSERT PORT PERITONEAL ACCESS  5/14/2014    Procedure: INSERT PORT PERITONEAL ACCESS;  Surgeon: Nga Yeung MD;  Location: [facility] OR     INSERT PORT VASCULAR ACCESS       LAPAROSCOPY DIAGNOSTIC (GYN)  5/14/2014    Procedure: LAPAROSCOPY DIAGNOSTIC (GYN);  Surgeon: Nga Yeung MD;  Location: [facility]     LAPAROTOMY EXPLORATORY Right 11/25/2015    Procedure: LAPAROTOMY EXPLORATORY;  Surgeon: Nga Yeung MD;  Location: [facility]     LAPAROTOMY, TUMOR DEBULKING, COMBINED  5/14/2014    Procedure: COMBINED LAPAROTOMY, TUMOR DEBULKING;  Surgeon: Nga Yeung MD;  Location: [facility]     REMOVE CATHETER PERITONEAL N/A 8/20/2014    Procedure: REMOVE CATHETER PERITONEAL;  Surgeon: Nga Yeung MD;  Location: [facility]     VASCULAR SURGERY      stent left iliac vein         Health Maintenance Due   Topic Date Due     ADVANCE CARE PLANNING  1960     HIV SCREENING  08/03/1973     DTAP/TDAP/TD  IMMUNIZATION (1 - Tdap) 08/03/1983     LIPID  08/03/2003     ZOSTER IMMUNIZATION (1 of 2) 08/03/2008     MAMMO SCREENING  02/01/2015     PAP  04/04/2016     PHQ-2  01/01/2019       Current Medications:     Current Outpatient Medications   Medication Sig Dispense Refill     albuterol (PROAIR HFA/PROVENTIL HFA/VENTOLIN HFA) 108 (90 Base) MCG/ACT inhaler Inhale 2 puffs into the lungs every 6 hours as needed for shortness of breath / dyspnea or wheezing (Patient not taking: Reported on 7/11/2019) 1 Inhaler 1     Ascorbic Acid (VITAMIN C PO) Take 500 mg by mouth daily        Calcium Carbonate-Vitamin D (CALCIUM + D PO) Take 1 tablet by mouth daily.       cyanocobalamin (VITAMIN B12) 1000 MCG/ML injection Inject 1 mL (1,000 mcg) into the muscle every 30 days 1 mL 11     diphenoxylate-atropine (LOMOTIL) 2.5-0.025 MG per tablet Take 2 tablets by mouth 4 times daily as needed for diarrhea 60 tablet 0     Ferrous Sulfate 324 (65 Fe) MG TBEC TAKE ONE TABLET BY MOUTH THREE TIMES DAILY WITH MEALS. TAKE WITH A SMALL AMOUNT OF ORANGE JUICE. DO NOT TAKE WITH CALCIUM 90 tablet 11     HEMP OIL OR EXTRACT OR OTHER CBD CANNABINOID, NOT MEDICAL CANNABIS,        HERBALS daily        iohexol (OMNIPAQUE) 140 MG/ML solution for oral use Mix entire bottle (50ml) of contast with 600ml (20 ounces) of water and drink half 2 hrs prior to CT scan and half 1 hr prior to scan 140 mL 0     LEVOTHYROXINE SODIUM PO Take by mouth daily        loperamide (IMODIUM) 2 MG capsule Take 2 mg by mouth 4 times daily as needed for diarrhea       LORazepam (ATIVAN) 1 MG tablet Take 1 tablet (1 mg) by mouth every 6 hours as needed (Anxiety, Nausea/Vomiting or Sleep) 30 tablet 2     magnesium oxide (MAG-OX) 400 MG tablet Take 1 tablet (400 mg) by mouth 2 times daily 90 tablet 3     omeprazole (PRILOSEC) 20 MG DR capsule Take 1 capsule (20 mg) by mouth daily. 30 capsule 4     order for DME Injection Supplies for Vitamin B12: 3cc syringes w/ 27 gauge needles, 1/2  "inch length 3 each 0     potassium chloride (KLOR-CON) 20 MEQ packet Take 20 mEq by mouth daily       VITAMIN E NATURAL PO Take 100 Units by mouth daily           Allergies:      No Known Allergies     Social History:     Social History     Tobacco Use     Smoking status: Former Smoker     Years: 8.00     Types: Cigarettes     Last attempt to quit: 1980     Years since quittin.0     Smokeless tobacco: Never Used     Tobacco comment: started at 11 yo and quit at 16 yo   Substance Use Topics     Alcohol use: Yes     Comment: 3x/day wine or jodi       History   Drug Use No         Family History:     The patient's family history is notable for:    Family History   Problem Relation Age of Onset     Cancer Mother 69        lung, smoker     Cancer Maternal Uncle 65        brain     Colon Cancer Maternal Aunt 80        colon         Physical Exam:     /65   Pulse 94   Temp 97  F (36.1  C) (Tympanic)   Resp 16   Ht 1.651 m (5' 5\")   Wt 61.3 kg (135 lb 2 oz)   SpO2 98%   BMI 22.49 kg/m    Body mass index is 22.49 kg/m .    General Appearance: healthy and alert, no distress     HEENT: no thyromegaly, no palpable nodules or masses        Cardiovascular: regular rate and rhythm, no gallops, rubs or murmurs     Respiratory: lungs clear, no rales, rhonchi or wheezes, normal diaphragmatic excursion    Musculoskeletal: extremities non tender and without edema    Skin: no lesions or rashes     Neurological: normal gait, no gross defects     Psychiatric: appropriate mood and affect                               Hematological: normal cervical, supraclavicular lymph nodes     Gastrointestinal:       abdomen soft, non-tender, non-distended, right sided fullness in abdomen, non-tender    Genitourinary: Deferred      Assessment:    Odalys Nathan is a 58 year old woman with a diagnosis of recurrent stage IIIC bilateral ovarian cancer. She is here today for follow up and disease management. Imaging suggests " slight increase in size of lymph nodes.    25 minutes were spent with this patient, over 50% of that time was spent in symptom management, treatment planning and in counseling and coordination of care.      Plan:     1.)         Recurrent Stage IIIC ovarian cancer, CT shows mixed response but  was not drawn today. Would like to see the  prior to deciding if will continue on the weekly taxol. Will have week off this week.  Reviewed signs and symptoms for when she should contact the clinic or seek additional care. Patient to contact the clinic with any questions or concerns in the interim.  Could consider olaparib vs. Adding avastin to weekly taxol.   -PDL-1 is 0%  -Foundation one testing does not show targetable agent.     2.) Genetic risk factors were assessed and she is negative for mutations in BRCA1, BRCA2, EPCAM, MLH1, MSH2, MSH6, PMS2, PTEN, and TP53 genes.     3.) Labs and/or tests ordered include:  Chemo labs prior to chemo. , mg and bmp today.      4.) Health maintenance issues addressed today include annual health maintenance and non-gynecologic issues with PCP.    5.)        Continue to take K and Mg bid at home; to be replaced in infusion as needed.     Nga Yeung MD    Department of Ob/Gyn and Women's Health  Division of Gynecologic Oncology  [facility]  [phone]          CC  Patient Care Team:  Aubrie Hester MD as PCP - General  Tammy Flores, RN as Continuity Care Coordinator (Oncology)  Nga Yeung MD as MD (Oncology)  Patricia Rocha APRN CNP as Nurse Practitioner (Nurse Practitioner)

## 2019-07-15 NOTE — NURSING NOTE
"Oncology Rooming Note    July 15, 2019 11:30 AM   Odalys Nathan is a 58 year old female who presents for:    Chief Complaint   Patient presents with     Oncology Clinic Visit       Ovarian cancer, right/left     Initial Vitals: /65   Pulse 94   Temp 97  F (36.1  C) (Tympanic)   Resp 16   Ht 1.651 m (5' 5\")   Wt 61.3 kg (135 lb 2 oz)   SpO2 98%   BMI 22.49 kg/m   Estimated body mass index is 22.49 kg/m  as calculated from the following:    Height as of this encounter: 1.651 m (5' 5\").    Weight as of this encounter: 61.3 kg (135 lb 2 oz). Body surface area is 1.68 meters squared.  No Pain (0) Comment: Data Unavailable   No LMP recorded. Patient has had a hysterectomy.  Allergies reviewed: Yes  Medications reviewed: Yes    Medications: Medication refills not needed today.  Pharmacy name entered into [facility]:    [id number] - [place], [place] - [address]  [facility] [place], [place] - [address] [place], [place] - [address] [facility]    Clinical concerns: follow up       Sheeba Barnett [facility]              "

## 2019-07-17 NOTE — TELEPHONE ENCOUNTER
Called patient with increase in , discussed options including single agent carbo, olaparib vs. Avastin. She has not seen carbo in > 1 year, has tolerated in past so would favor. Olaparib would be parp I after Parp I (nirparib in past). Patient is aware that minimal data on this administration but SGO abstract presented did see some disease response. Avastin risks including bowel perforation a risk as patient heavily pretreated.    She will call clinic Monday with decision on how to proceed.    Nga Yeung MD    Department of Ob/Gyn and Women's Health  Division of Gynecologic Oncology  [facility]  [phone]

## 2019-07-24 NOTE — TELEPHONE ENCOUNTER
Patient called to let Tammy Flores RNCC know that she would like to start her infusion the week of 8/5 and that she would like the week of 8/19 off.

## 2019-07-24 NOTE — PROGRESS NOTES
Care Coordinator Note    Odalys wanted to start the Parp inhibitor as discussed at the 7/15 visit.   This was reviewed with Dr Yeung and she felt that because she was not platinum  sensitive it would not get approved and would like to start Gemzar after she gets back from vacation on 8/3/19.   The above was left as a message  with my return  number.   Request was sent to [facility] for scheduling on 8/8/19.       Tammy CISNEROS RN, OCN  Care Coordinator   Gynecologic Cancer   Office [phone]  Pager [phone] #[phone]

## 2019-08-08 NOTE — NURSING NOTE
"Oncology Rooming Note    August 8, 2019 10:05 AM   Odalys Nathan is a 59 year old female who presents for:    Chief Complaint   Patient presents with     Oncology Clinic Visit     Ovarian cancer, right      Initial Vitals: /74   Pulse 109   Temp 97.4  F (36.3  C) (Oral)   Resp 19   Ht 1.651 m (5' 5\")   Wt 59.9 kg (132 lb)   SpO2 98%   BMI 21.97 kg/m   Estimated body mass index is 21.97 kg/m  as calculated from the following:    Height as of this encounter: 1.651 m (5' 5\").    Weight as of this encounter: 59.9 kg (132 lb). Body surface area is 1.66 meters squared.  Moderate Pain (5) Comment: Data Unavailable   No LMP recorded. Patient has had a hysterectomy.  Allergies reviewed: Yes  Medications reviewed: Yes    Medications: Medication refills not needed today.  Pharmacy name entered into [facility]:    [id number] - [place], [place] - [address]  [facility] [place], [place] - [address] [place], [place] - [address] [facility]    Clinical concerns: Follow Up       Radha Mcdaniels CMA              "

## 2019-08-08 NOTE — TELEPHONE ENCOUNTER
PA Initiation    Medication: tramadol 50mg tablets  Insurance Company: [employer] - Phone [phone] Fax [phone]  Pharmacy Filling the Rx: [facility][id number] - [place], [place] - [address]  Filling Pharmacy Phone:    Filling Pharmacy Fax:    Start Date: 8/8/2019

## 2019-08-08 NOTE — PROGRESS NOTES
Gynecologic Oncology Follow-Up Note    RE: Odalys Nathan  MRN: [id number]  : 1960  Date of Visit: 2019    CC: Odalys Nathan is a 59 year old year old female with recurrent ovarian cancer who presents today for follow up regarding disease management.    HPI: Odalys comes to the clinic accompanied by her  Marcos. She has been feeling poorly for the past week with generalized abdominal pain, nausea, and emesis x2. Of note, she has never vomited with her disease or her treatment before. She does feel relief with vomiting. Not taking any anti-emetics because she feels better after vomiting. Notes constipation which is abnormal for her, typically has diarrhea- feels improvement in abdominal pain after having a bowel movement. Unable to tell if she is passing much flatus or not. Oral intake has been drastically reduced this past week- drinking fluids but not eating much solid food because of nausea. Also notes that the thought of swallowing pills and taking in food is unappealing to her right now which is more of a factor in her decreased intake than the nausea. Was just in [place], returned a week ago when her symptoms started. Reports that two of her fellow travelers had GI distress upon returning. Odalys denies fevers or chills. Her fatigue is worsening as well, able to complete ADLs and rest, but taking in the garbage cans this morning was about the extent of recent physical activity. She also notes worsening neoplastic pain in her RLQ. Taking ibuprofen every few days which used to be effective, however, is not providing great pain control anymore. She is hesitant to try oxycodone due to sedating side effects. No fevers, chills, bleeding. Denies urinary concerns, SOB, chest pain. Declines palliative medicine referral at this time, would like to see if her symptoms improve with a new chemotherapy regimen.    Oncology History:  2012 - Admitted to hospital for 2 weeks of intermittent  abdominal cramping, distention, diarrhea and N/V. CT of abdomen/pelvis significant for small bowel obstruction, a heterogenous soft tissue density in the pelvis, omental nodules, and ascites. Bilateral adnexal masses per U/S of pelvis. CA-125 was elevated at 987, CEA was normal at 1.0.    1/18/12 - Therapeutic paracentesis (4 L) with cytology confirming malignancy (MA positive, weak ER positive, CK-7 positive consistent with GYN primary). Surgery recommended d/t potential for falling blood counts 2/2 chemotherapy (patient is [facility] and limiting blood transfusion)    2/1/12 - Exploratory laparotomy, MAR BSO, lysis of adhesion, Appendectomy, Repair cystotomy, Omentectomy Wpsk-mxeysb-kpnergakv-transverse-colon resection, Ileo-descending colon anastomosis, CUSA, & Colonoscopy (done by Dr. Amato and colorectal team).  2/20/2012 - Admitted to hospital for bilateral pulmonary emboli and drainage of pleural effusion. Started on Lovenox.  2/28/12-3/21/12: Cycle #1-2 Carbo/Taxol IV  3/29/12 - Started on Keflex by her PCP for infection in her healing wound (immediately below her umbilicus).    4/11/12-6/14/12: Cycle #3-6 IV chemo, Cycle #1 IV/IP chemo  7/16/12 -  8, CT PRADEEP - enrolled in  - observation arm  3/4/13-6/28/13:  6, 9, 12, 15, 20.  7/1/13: CT Chest, Abdomen, Pelvis IMPRESSION:  1. Worsening metastatic ovarian carcinoma suggested by increased size of soft tissue nodules anterior to the right psoas muscle, that may represent growing mesenteric lymphadenopathy.  2. Remaining prominent right lower quadrant mesenteric lymph nodes are not significantly changed from CT 7/16/2012.  3. Clustered nodular opacities in the right lower lobe are not significant change from 7/16/2012 and remain indeterminate. Again, the appearance and distribution is suggestive of an infectious etiology.  4. Stable 8 mm soft tissue nodule in left breast, unchanged since at least 02/20/2012. This can be observed on  followup studies, but correlation with mammography could be considered.  Decision made to start Doxil/Carbo.  7/11/13: The left ventricular ejection fraction is normal at 66.4%.  7/12/13-9/6/13: Cycle #1-3 Doxil/Carbo.  10, 11.    9/30/13 CT C/A/P Impression:  1. Overall, favorable response to treatment with decreasing size of soft tissue nodules tracking along the anterior aspect of the right psoas muscle.  2. 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  3. Improved cluster of right lower lobe pulmonary nodular opacities. These may represent resolving infection.  10/3/13-12/9/13:  9, 8, 10. Cycle 4-6 Doxil/Carbo.    1/13/14 CT C/A/P Impression:   1. Stable appearance of metastatic ovarian cancer. Scattered soft tissue nodules along the anterior aspect of the right psoas muscle are unchanged in size. Mild mesenteric lymphadenopathy is unchanged.  2. Clustered micronodules in the right lower lobe are unchanged from 9/30/2013, but improved from 7/1/2013. This history suggests a postinflammatory/postinfectious etiology.  3. 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  1/16/14 Discussed multiple options for her based on relatively stable-appearing disease on CT but slight increase in  (which has had small increase to 20 with last recurrence) including chemo break with recheck of  in 1 month, starting new chemo agent immediately, and exploratory surgery with possible resection of nodules. She is considered platinum-sensitive based on > 1 year remission after Taxol/Carbo, which we will take into consideration for future chemo planning. I am not inclined to surgery at this time given difficulty she already has with diarrhea secondary to past colon resection. I suspect we would need to resect further bowel due to mesenteric disease. Also explained inherent risks of any major surgery. Also mentioned maintenance chemo, but this has not been shown to increase overall survival and would likely  decrease her quality of life without significant benefit. Family is going on vacation to [place] in 2 weeks and Odalys does not want to have chemo prior to that, so will plan to take 1 month break. She can have  at that time (discussed checking toady since last draw was in early December, but as it would likely not change treatment plan and she has h/o slow rising , will not check today).  2/17/14  16    2/20/14: Decision to take break from chemo for two months, followed by CT and CA-125.    4/21/14  27  4/21/14 CT C/A/P Impression:    1. Increased size of 2 low-attenuation lymph nodes anterior to the right psoas muscle is concerning for worsening metastatic ovarian cancer.    2. New circumferential thickening of a 3.8 cm length segment of distal transverse colon is likely physiologic. Recommend attention on followup imaging.    3. Grossly unchanged size of clustered small nodules versus scarring in the right lower lobe the lungs.    4. Stable thrombosis of the right ovarian vein.  5/14/14: Diagnostic laparoscopy converted to exploratory laparotomy and removal of mesenteric masses, tumor debulking, peritoneal biopsies and intraperitoneal port placement. On laparoscopy, it was noted that there were small nodules on the anterior abdominal wall near the previous incision, small nodules on the right pelvic sidewall as well. Nodules were palpated in the mesentery; however, as it was unable to clarify where the origin of the nodules was, the decision was made to open the patient. On opening there was found to be approximately a 3 cm nodule in the small bowel mesentery and another separate approximately 2 cm nodule in the bowel mesentery. Pelvis without evidence of cancer, some mesenteric lymph nodes were palpated. No evidence otherwise of any disseminated cancer throughout the abdomen.    FINAL DIAGNOSIS:  A: Peritoneum, right paracolic gutter, biopsy:  -Necrotic tissue  -No viable tumor  "present  B: Soft tissue, anterior abdominal wall nodule, biopsy:  -Fibroadipose tissue with abundant macrophages, fibrosis and calcifications  -Negative for malignancy   C: Lymph nodes, mesentery, \"nodule\", excision:  -Metastatic/recurrent high grade serous carcinoma in two of two lymph nodes (2/2)  -Largest metastasis: 1.3 cm  -See comment  D: Peritoneum, right paracolic gutter #2, biopsy:  -Fibroadipose tissue with granulomatous inflammation surrounding refractile material  -Negative for malignancy   E: Small bowel adhesion, biopsy:  -Fibroconnective tissue, consistent with adhesion  -Negative for malignancy  F: Lymph nodes, mesentry, not otherwise specified, excision:  -Two lymph nodes, negative for metastatic carcinoma (0/2)  G: Lymph node, mesentery, \"#2\", excision:  -One lymph node, negative for metastatic carcinoma (0/1)  H: Lymph nodes, mesentery, \"nodule #2\", excision:  -Five lymph nodes, negative for metastatic carcinoma (0/5)  COMMENT:  Some of the specimens show post-operative changes. Others show possible treatment related changes, including necrosis. The metastatic carcinoma in the mesenteric lymph nodes (specimen C) shows variable morphology, including relatively low grade tumor with papillary architecture, and high grade tumor comprised of nests of tumor cells with irregular, slit-like spaces and marked nuclear pleomorphism.    5/29/14: Cycle 1 IV PACLitaxel / IP CISplatin / IP PACLitaxel.  - 28.  6/26/14: Cycle #2 IV/IP.  10  8/5/14: CT chest/abd/pelvis IMPRESSION     1. In this patient with ovarian cancer, overall findings are indicative of stable/slight improvement, as multiple mesenteric lymphadenopathy and scattered nodular peritoneal soft tissue mass lesions appear unchanged or slightly smaller since 4/21/2014.    2. 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 Mild dilatation of the second and the third portion of the duodenum with a narrow SMA angle. This could " represent SMA syndrome, if clinically correlated.17/14:    Cycle #3 IV/IP.  10.    CT chest/abd/pelvis with contrast on 8/5/14    Impression:    1. In this patient with ovarian cancer, overall findings are indicative of stable/slight improvement, as multiple mesenteric lymphadenopathy and scattered nodular peritoneal soft tissue mass lesions appear unchanged or slightly smaller since 4/21/2014.    2. 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 Mild dilatation of the second and the third portion of the duodenum with a narrow SMA angle. This could represent SMA syndrome, if clinically correlated.  8/7/14: Cycle #4 Taxol/Carbo (changed from IV/IP).  10. She has been feeling okay. She is unsure if she can finish out the course of 6 cycles IV/IP taxol/cisplatin. She feels like she has the flu for about a week then starts feeling gradually better after each chemo cycle. Her spouse notes that she actually has been more sick with the treatments than she initially admits here. She was also previously having some rib pain. Denies any rib pain now. Denies any chest pain or shortness of breath.  Plan: discussed recent CT cap results and switching to just IV as she is feeling miserable with IP treatments. Switch to IV carbo/taxol as patient is platinum sensitive.  We also discussed her taking part of the tesaro trial, which would require BRCA testing. She would like to take part in this trial if eligible.  8/20/14: Remove Intraperitoneal Port ( Port and catheter intact - discarded)  8/28/14: Cycle #5 Taxol/Carbo held due to thrombocytopenia.  6.    She denies any vaginal bleeding, no changes in her bowel or bladder habits, no nausea/emesis, no lower extremity edema, and no difficulties eating or sleeping. She denies any abdominal discomfort/bloating, no fevers or chills, and no chest pain or shortness of breath. She states her diarrhea is the same. She reports some fatigue which improves about  1-2 weeks after her chemotherapy. She states she does not need any medication refills and she was told she does not meet the criteria for the TESARO trial. She states she has 3 bags of iv fluids left over from her previous chemotherapy and will give these to herself. She states she is ready for her treatment today.    9/29/14: Cycle #6 Taxol/Carbo  6. Insurance questions regarding GSF coverage today. No concerns other than fatigue. Taking iron for anemia and does not desire blood transfusion. Using neulasta for neutropenia. Using home IV hydration if needed. Baseline unchanged. No abdominal bloating, constipation, diarrhea, pain, vaginal or rectal bleeding, cough or dyspnea, fluid retention.    10/16/14: Impression:    1. Nodular peritoneal soft tissue mass in the right lower quadrant adjacent to the psoas muscle is no longer appreciated. Adjacent prominent lymphadenopathy is unchanged from previous exam. No new peritoneal lesions.    2. 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     10/20/14:  5. CT chest/abdomen/pelvis on 10/16/14 showed nodular peritoneal soft tissue mass in the right lower quadrant adjacent to the psoas muscle is no longer appreciated. Adjacent prominent lymphadenopathy is unchanged from previous exam. 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  1/27/15:  6.  4/28/15:  14.  5/26/15:  18.  6/2/15: CT cap Impression:  1. Postsurgical changes of hysterectomy and bilateral salpingo-oophorectomy for ovarian cancer. There is a new 8 mm hazy, ill-defined hypoattenuating lesion in hepatic segment 6 which is suspicious for a metastatic deposit. Further evaluation with ultrasound in recommended.    2. Increased size of a left retroperitoneal lymph node which is indeterminate but may represent a ciro metastasis. Mildly prominent lymph nodes in the right lower quadrant are not significantly changed.  3. Moderate colonic  stool burden.    6/4/15: US abdomen IMPRESSION:    Hyperechoic lesion in the right hepatic lobe, consistent with hemangioma. This does not corresponds to the area of the lesion seen on CT from 6/2/2015. An MR would be helpful for identifying and characterizing the lesion from the recent CT.  6/12/15: MR abd IMPRESSION:  1. New 20 x 11 mm enhancing lesions between the right obliques, concerning for metastatic disease. This lesions should be amenable to percutaneous biopsy, if indicated.  2. Correlating to the lesion visualized on comparison CT is a hepatic segment 6 subcapsular 7 mm lesion. Overall the appearance favors the diagnosis of a simple cyst. However, there is faint suggestion of mild peripheral arterial enhancement. Although this is favored as  artifactual, this should be followed up to confirm stability. Recommend 6 month followup.  3. Hepatic segment 6, 5 mm lesion too small to technically characterize. Differential would favor FNH, less likely flash filling hemangioma. Recommend attention on followup.  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      9/1/15: Cycle #1 Avastin/Cytoxan.   31.      9/24/15:feeling generally well. She says she has been having back and stomach spasms. She is taking cytosine daily (she ran out yesterday). She also says its affecting her voice. Admits that it burns occasionally. She is eating and drinking normal. She also admit diarrhea, 5-7 times daily, lose/watery. She trying to stay hydrated and eat fiber. She also says that her body is sore, especially the bottom of her feet. Her blood pressure is normal. She also admits having headache after her first infusion.       9/24/15: Cycle #2 Avastin/Cytoxan.  19.  10/15/15: Cycle #3 Avastin/Cytoxan.  16.      11/6/15: CT c/a/p IMPRESSION:    1. Stable postoperative change of MAR/BSO  for ovarian cancer.  2. The lesion in the right flank abdominal musculature is slightly decreased in size. Otherwise, stable examination.  2. No evidence of metastatic disease in the chest.     11/20/15: Treatment planning visit,  16     11/25/15: surgical pathology report  FINAL DIAGNOSIS:  Soft tissue, right oblique muscle mass, excision:  -Recurrent ovarian serous carcinoma  -Carcinoma is present less than 1 mm from one resection margin  -Background skeletal muscle and fibroadipose tissue     1/4/16:  22  1/11/16-1/27/16: Radiation to right flank x 12 treatments  4/7/2016:  94. CT cap IMPRESSION:    In this patient with ovarian cancer status post MAR/BSO and descending/transverse colectomy:  1. No evidence for malignancy in the chest, abdomen, or pelvis.  2. Stable small hypodense segment 6 liver lesion, appears more likely benign, possibly a cyst.  5/13/16:  124.  6/3/16: PET CT IMPRESSION: In this patient with a history of ovarian cancer:  1. Hypermetabolic and enlarging periaortic and perihepatic lymphadenopathy compatible with metastatic disease, as detailed above.  2. Although hypodense lesion in hepatic segment 6 has been present since 6/2/2015 associated hypermetabolism makes this lesion highly concerning for metastatic disease.     Plan: to start Niraparib under TESARO study.      6/9/16:  137.  6/14/16: Cycle #1 Niraparib.    6/28/16: Cycle #1 D15 Niraparib.    7/5/16:  100.    7/11/16: Cycle #2 Nraparib.   83.     8/3/2016: PET CT IMPRESSION:    In this patient with known history of ovarian cancer:  1) New pleural based nodular opacities in the lateral and inferior aspects of the bilateral lower lobes, worse in the left lung. Likely infection. Close follow up is recommended.      2) Slight decrease in hypermetabolic abdominal lymphadenopathy. 2 hypermetabolic lymph nodes persist.  3) Unchanged right hepatic lobe metastatic lesion.      8/9/16: Cycle #3  Niraparib.  69.  9/6/16: Cycle #4 Niraparib.  53. Dose held due to anemia.  9/13/16: Eval for potential cycle 4 niraparib. Dose held due to anemia.  10/4/16: CT CAP impression:  IMPRESSION: In this patient with a known history of ovarian cancer:  1. There has been interval resolution of pleural-based nodular  opacities which likely represented infection.  2. Abdominal lymphadenopathy in the form of 2 portacaval lymph nodes  have not significantly changed in size, noted to be hypermetabolic on  prior PET/CT.  3. Previously demonstrated metastatic lesion in the right lobe of the  liver is not significantly changed.  10/11/16:  60  11/1/16: C6 niraparib,  75  11/29/16: C7 niraparib.  78. CT CAP impression as follows:  Target lesions (RECIST criteria):       A previously described target lesion superior to the head of the  pancreas (series 2, image 64)  (referred to as a perihepatic node on  6/3/2016) may not be a valid target lesion because it measured less  than 1.5 cm originally. However, this particular node has decreased in  size, now measuring 7 mm in short axis versus 14 mm on 6/3/2016 when  measured in a similar fashion.       2.3 cm short axis portacaval lymph node on series 2 image 67,  previously 2.0 cm on 10/4/2016.       1.2 cm subtle hypodensity in hepatic segment 6 on series 2 image 75,  stable on multiple studies since at least 6/3/2016     Sum of diameters today: 3.5 cm. Sum of diameters 10/4/2016: 3.2 cm.  Growth = 9%.    12/27/16: C8 niraparib.  105.  1/25/17: C9 niraparib.  108.  2/23/17: C10 niraparib.  132.   CT CAP impression:  Sum of target lesion diameters today: 3.7 cm. Sum of target lesion  diameters on 11/28/2016: 3.5 cm. Growth= 6%  1. In this patient with history of ovarian cancer there is stable  disease by RECIST criteria as evidenced by:   1a. Mildly increased size of liver metastasis.  1b. Stable portacaval lymphadenopathy.  1c. No  evidence of metastatic disease in the chest.  2. Trace emphysematous changes of the lungs.  3/22/17: C11 niraparib.  132.  4/19/17: C12 niraparib.  127.  5/16/17: CT CAP IMPRESSION: In this patient with ovarian cancer:  1. Mildly increased size of hepatic metastasis segment 6 with subtle increased capsular retraction.  2. Stable edmundo hepatis nodes, with mild increase in size of periaortic lymph nodes.  3. Prominent left supraclavicular lymph node with subtle increase in size compared to prior studies, particularly comparing to 10/4/2016.  4. Mild subtle groundglass opacities in the right upper lobe, not present on prior study, lesser extent in the right lower lobe.  Findings may represent infection, additional consideration is malignancy (less likely), and attention on follow-up study  Recommended.  Addendum:   Prominent left supraclavicular lymph node (3/25) is stable from most recent CT performed 2/20/2017, currently measuring 14 x 16 mm, previously 14 x 16 mm on 2/20/2017.      The portal caval lymph node/edmundo hepatis lymph node is stable from 2/20/2017, measuring 21 mm.      Clarification of size of the para-aortic lymph node (series 3 image 327). It short axis measurement is 11 mm versus 9 mm on prior study.      Hepatic segment 6 triangular-shaped low density lesion (3/362) measures 14 mm, previously measured 12 mm, demonstrating a  possible/questionable minimal subtle increase in size. Similarly the lymph nodes noted above in the supraclavicular and para-aortic regions demonstrate possible minimal possible subtle increase in size.      5/18/17: Cycle #13 Niraparib.  191.  6/15/17: Cycle #14 Niraparib.  146.  7/13/17: Cycle #15 Niraparib 200 mg.  171     CT (8/9/17):       IMPRESSION:  1. Segment 6 hepatic metastasis is stable to minimally increased in size.  2. Slight increase in multicentric adenopathy, most pronounced at the edmundo hepatis. Additional sites include the  inferior left neck/supraclavicular region and retroperitoneum which appear stable to  minimally increased.      8/10/17:  175  9/14/17: MUGA LVEF 54%  9/22/17: C1D1 carboplatin/Doxil.  187.  10/19/17: C2D1 carboplatin/Doxil.  108.  11/17/17: C3D1 carboplatin/Doxil.  82.  12/14/17: C4D1 carboplatin/Doxil/avastin.  83.  Of note, avastin held on C4D14  1/12/18: C5D1 carboplatin/Doxil/avastin.  80.  1/26/18: platelets 61, patient was not given avastin due to being jehova's witness.   2/9/18: C6D1 carboplatin/Doxil/Avastin.   71.  3/2/18:  49. Three month treatment break.  6/20/2018:  170. CT CAP:  IMPRESSION: In this patient with history of ovarian cancer:  1. Increased size of a right hepatic lobe lesion and numerous edmundo hepatis and retroperitoneal lymph nodes compatible with progression of metastatic disease.  2. New mild intrahepatic biliary ductal dilatation, periportal edema, and pericholecystic fluid. Increased soft tissue fullness in the edmundo hepatis in the expected location of the common hepatic duct. Findings  are suspicious for developing biliary ductal obstruction secondary to worsening metastatic disease in the edmundo hepatis. Correlation with liver function tests is recommended. Right upper quadrant ultrasound  may be beneficial.  3. Unchanged left supraclavicular and right hilar lymphadenopathy in the chest.      8/3/18: C1D1 weekly paclitaxel.  not done.  9/20/18: C2D1 weekly paclitaxel 80mg/m2. Ca 125-56  11/8/2018: C3D1 weekly paclitaxel;  26     12/17/18: Ct cap IMPRESSION:  1. New area of lobulated hypodense nodularity at the far-inferior right liver. This raises the possibility of a new area of hepatic metastasis.  2. Conversely, other nodules within the right liver are smaller suggesting improvement.  3. Improved adenopathy at the abdominal retroperitoneum. Improved left  supraclavicular adenopathy. Stable mildly prominent right  hilar lymph nodes.  4. Previously noted ill-defined soft tissue at the edmundo hepatis region is still present but appears less prominent in size.  5. New finding of segmental wall thickening of the proximal sigmoid colon with some fluid distention of the adjacent colon. This could represent a segmental colitis. Other etiologies not excluded.     12/20/18:   19.  1/22/19:  45.  3/20/19: CT cap IMPRESSION:  1. Progression of disease with increasing size of a right inferior hepatic mass consistent with metastatic disease.  2. Increasing ill-defined multifocal regions of soft tissue at the edmundo hepatis consistent with malignant adenopathy versus malignant implants. Associated increased intrahepatic biliary ductal dilatation.  3. Other areas of progressive adenopathy noted at the left neck base, mediastinum, and abdominal retroperitoneum.  4. New area of carcinomatosis medial to stomach at the left upper abdomen.     3/20/19: Cycle #1 weekly paclitaxel.   180.  5/7/19: Cycle #2 weekly paclitaxel.    142.  6/20/19: Cycle #3 weekly paclitaxel/  259.     7/11/19: CT CAP-IMPRESSION:  1. Slight increased size of the left supraclavicular lymph node.  Slight progression of the mediastinal lymph nodes is also evident. New  periesophageal node as described above. Retroperitoneal nodes are  stable if not slightly smaller. Some of these nodes appear to have  partially calcified suggesting a response to interval therapy.  2. Interval decreased size of the inferior right hepatic lobe lesion  now with an area of central calcification or enhancement. No new liver  lesions. Remaining abdominal organs are grossly unremarkable. No  significant hydronephrosis.  3. Postop changes involving the bowel and pelvic region. No recurrent  pelvic mass.                 Past Medical History:   Diagnosis Date     Antiplatelet or antithrombotic long-term use      Ascites      Blood clot in the legs      Diabetes (H)       Ovarian cancer (H)     serous,stg IV     Pleural effusion      Pulmonary embolism (H) 2/2012     Refusal of blood transfusions as patient is [employer]      Short gut syndrome      Subclinical hypothyroidism 4/18/2013     Thrombosis of leg        Past Surgical History:   Procedure Laterality Date     COLECTOMY       COLONOSCOPY  2/1/2012    Procedure:COLONOSCOPY; With Biopsy; Surgeon:TONI SULLIVAN; Location:[facility]     HYSTERECTOMY TOTAL ABD, RHIANNA SALPINGO-OOPHORECTOMY, NODE DISSECTION, TUMOR DEBULKING, COMBINED  2/1/2012    Procedure:COMBINED HYSTERECTOMY TOTAL ABDOMINAL, BILATERAL SALPINGO-OOPHORECTOMY, NODE DISSECTION, TUMOR DEBULKING;  Exploratory Laparotomy, Total Abdominal Hysterectomy, Bilateral Salpingo-Oophorectomy, appendectomy,lysis of adhesions, ileal, ascending, transverse and splenic flexure resection, ileal descending bowel renanastomosis, incidental cystotomy repair, CUSA procedure and colonoscopy ; [facility]     INSERT PORT PERITONEAL ACCESS  4/3/2012    Procedure:INSERT PORT PERITONEAL ACCESS; Intraperitoneal Port Placement (c-arm); Surgeon:SAMUEL CARRASCO; Location:[facility]     INSERT PORT PERITONEAL ACCESS  5/14/2014    Procedure: INSERT PORT PERITONEAL ACCESS;  Surgeon: Nga Yeung MD;  Location: [facility]     INSERT PORT VASCULAR ACCESS       LAPAROSCOPY DIAGNOSTIC (GYN)  5/14/2014    Procedure: LAPAROSCOPY DIAGNOSTIC (GYN);  Surgeon: Nga Yeung MD;  Location: [facility]     LAPAROTOMY EXPLORATORY Right 11/25/2015    Procedure: LAPAROTOMY EXPLORATORY;  Surgeon: Nga Yeung MD;  Location: [facility]     LAPAROTOMY, TUMOR DEBULKING, COMBINED  5/14/2014    Procedure: COMBINED LAPAROTOMY, TUMOR DEBULKING;  Surgeon: Nga Yeung MD;  Location: [facility]     REMOVE CATHETER PERITONEAL N/A 8/20/2014    Procedure: REMOVE CATHETER PERITONEAL;  Surgeon: Nga Yeung MD;  Location: [facility]     VASCULAR SURGERY      stent left iliac vein       Current Outpatient Medications    Medication     albuterol (PROAIR HFA/PROVENTIL HFA/VENTOLIN HFA) 108 (90 Base) MCG/ACT inhaler     Ascorbic Acid (VITAMIN C PO)     Calcium Carbonate-Vitamin D (CALCIUM + D PO)     cyanocobalamin (VITAMIN B12) 1000 MCG/ML injection     diphenoxylate-atropine (LOMOTIL) 2.5-0.025 MG per tablet     Ferrous Sulfate 324 (65 Fe) MG TBEC     HEMP OIL OR EXTRACT OR OTHER CBD CANNABINOID, NOT MEDICAL CANNABIS,     HERBALS     iohexol (OMNIPAQUE) 140 MG/ML solution for oral use     LEVOTHYROXINE SODIUM PO     loperamide (IMODIUM) 2 MG capsule     LORazepam (ATIVAN) 1 MG tablet     magnesium oxide (MAG-OX) 400 MG tablet     omeprazole (PRILOSEC) 20 MG DR capsule     order for DME     potassium chloride (KLOR-CON) 20 MEQ packet     VITAMIN E NATURAL PO     No current facility-administered medications for this visit.      Facility-Administered Medications Ordered in Other Visits   Medication     heparin 100 UNIT/ML injection 500 Units       No Known Allergies    Family History   Problem Relation Age of Onset     Cancer Mother 69        lung, smoker     Cancer Maternal Uncle 65        brain     Colon Cancer Maternal Aunt 80        colon       Social History     Socioeconomic History     Marital status:      Spouse name: Not on file     Number of children: Not on file     Years of education: Not on file     Highest education level: Not on file   Occupational History     Not on file   Social Needs     Financial resource strain: Not on file     Food insecurity:     Worry: Not on file     Inability: Not on file     Transportation needs:     Medical: Not on file     Non-medical: Not on file   Tobacco Use     Smoking status: Former Smoker     Years: 8.00     Types: Cigarettes     Last attempt to quit: 1980     Years since quittin.1     Smokeless tobacco: Never Used     Tobacco comment: started at 9 yo and quit at 18 yo   Substance and Sexual Activity     Alcohol use: Yes     Comment: 3x/day wine or jodi     Drug  "use: No     Sexual activity: Not on file   Lifestyle     Physical activity:     Days per week: Not on file     Minutes per session: Not on file     Stress: Not on file   Relationships     Social connections:     Talks on phone: Not on file     Gets together: Not on file     Attends [employer] service: Not on file     Active member of club or organization: Not on file     Attends meetings of clubs or organizations: Not on file     Relationship status: Not on file     Intimate partner violence:     Fear of current or ex partner: Not on file     Emotionally abused: Not on file     Physically abused: Not on file     Forced sexual activity: Not on file   Other Topics Concern     Parent/sibling w/ CABG, MI or angioplasty before 65F 55M? Not Asked   Social History Narrative     Not on file           ROS  12 point ROS reviewed and negative except as listed in HPI      Physical Exam:    /74   Pulse 109   Temp 97.4  F (36.3  C) (Oral)   Resp 19   Ht 1.651 m (5' 5\")   Wt 59.9 kg (132 lb)   SpO2 98%   BMI 21.97 kg/m      CONSTITUTIONAL: Alert non-toxic appearing female in no acute distress  HEAD: Normocephalic, atraumatic  EYES: PERRLA; no scleral icterus  ENT: Oropharynx pink without lesions  NECK: Neck supple, firm fixed left supraclavicular node roughly 3cm x 4cm, non-tender without erythema or edema, not eroding through skin  RESPIRATORY: Lungs clear to auscultation, no increased work of breathing noted  CV: slightly tachycardic, regular rhythm, S1S2, no clicks, murmurs, rubs, or gallops; bilateral lower extremities without edema, dorsalis pedis pulses 2+ bilaterally  GASTROINTESTINAL: Normoactive bowel sounds x4 quadrants, abdomen soft, slightly distended, and non-tender to palpation without masses or organomegaly, no rebound tenderness, guarding, or rigidity  GENITOURINARY: Not indicated  LYMPHATIC: Cervical, supraclavicular, and inguinal nodes without lymphadenopathy  MUSCULOSKELETAL: Moves all extremities, " no obvious muscle wasting  NEUROLOGIC: No gross deficits, normal gait  SKIN: Appropriate color for race, warm and dry, no rashes or lesions to unclothed skin  PSYCHIATRIC: Pleasant and interactive, affect bright, makes appropriate eye contact, thought process linear    Labs:      8/8/2019  Day 1   Hemoglobin 11.7 - 15.7 g/dL 13.2   Hematocrit 35.0 - 47.0 % 39.7   Platelet Count 150 - 450 10e9/L 320   Absolute Neutrophil 1.6 - 8.3 10e9/L 7.4   Sodium 133 - 144 mmol/L 132 (A)   Potassium 3.4 - 5.3 mmol/L 3.5   Chloride 94 - 109 mmol/L 99   Carbon Dioxide 20 - 32 mmol/L 24   Urea Nitrogen 7 - 30 mg/dL 10   Creatinine 0.52 - 1.04 mg/dL 0.57   Calcium 8.5 - 10.1 mg/dL 8.6   Magnesium 1.6 - 2.3 mg/dL 1.1 (A)   Bilirubin Total 0.2 - 1.3 mg/dL 1.2   ALT 0 - 50 U/L 59 (A)   AST 0 - 45 U/L 25   Alkaline Phosphatase 40 - 150 U/L 677 (A)   Albumin 3.4 - 5.0 g/dL 2.8 (A)   Protein Total 6.8 - 8.8 g/dL 7.0   WBC 4.0 - 11.0 10e9/L 10.5   Abdominal x-ray negative for SBO   pending    Assessment/Plan:  1) Recurrent ovarian cancer: Nausea, vomiting, and abdominal pain- abdominal x-ray negative for SBO. Question worsening disease (has been off treatment since 7/11/19) vs gastroenteritis. Gastroenteritis less likely given that she has been afebrile, has not had diarrhea, and her WBC count is within normal limits. Hemodynamically stable. Proceed with C1D1 gemcitabine as ordered by Dr. Yeung. To receive three cycles followed by imaging and treatment planning with Dr. Yeung. Reviewed signs and symptoms for when she should contact the clinic or seek additional care, including but not limited to fever, chills, inability to keep down food or fluids, nausea and vomiting not controlled with antiemetics, and diarrhea leading to dehydration. Patient to contact the clinic with any questions or concerns in the interim. Discussed her recurrent disease and worsening symptoms of disease burden- declines palliative medicine referral at this  time but states she will continue to consider this.  2) Chemotherapy/disease effects:   Nausea, vomiting, low appetite: Disease vs gastroenteritis- suspect the former. We discussed trying anti-emetics prior to oral intake and to work on eating small frequent meals.   Abdominal pain: Abd x-ray negative for SBO. Suspect due to worsening disease but does feel better after BMs, may be constipated. Worsening RLQ pain, ibuprofen no longer helpful. Hesitant to try oxycodone due to sedating effects. Open to trying tramadol which may potentially be less sedating but discussed that she will still likely notice some sedation with this- tramadol 25mg-50mg PO BID PRN. To call clinic if this is ineffective. Discussed palliative medicine referral, declines at this time. She would like to see if her pain improves with starting a new treatment regimen and a new pain medication. Reviewed safety with tramadol.    Fatigue: Worsening over time. Continue with physical activity and periods of rest. Declines palliative referral at this time.   Hyponatremia: Suspect she may be slightly dehydrated, reports she was sweating a lot in [place] and not drinking as much as normal. NS 1L IV x1 in infusion today, to take in fluids containing sodium.   Hypomagnesemia: Slight worsening of chronic issue- stopped taking magnesium supplement this week due to not feeling well. To be replaced in infusion, restart oral supplement, and have lab recheck and possible infusion in 3-4 days.   Elevated LFTs: Known hepatic metastasis, LFTs stable. Continue to monitor.  3) Genetic counseling: Panel testing negative  4) Health maintenance issues discussed include to follow up with PCP for non-gynecologic concerns and co-morbid conditions  5) Patient verbalized understanding of and agreement with plan    A total of 25 minutes of face to face time were spent with the patient with over 50% of that time spent in counseling, coordination of care, education, and symptom  management.    HAILE De Paz, FNP-C  Division of Gynecologic Oncology  [facility]  Pager: [phone]

## 2019-08-08 NOTE — PROGRESS NOTES
Nursing Note:  Odalys Meadeandrew Nathan presents today for port labs.    Patient seen by provider today: Yes: Patricia Rocha NP   present during visit today: Not Applicable.    Note: N/A.    Intravenous Access:  Labs drawn without difficulty.  Implanted Port.    Discharge Plan:   Patient was sent to [facility] for clinic appointment.    Gloria Proctor RN

## 2019-08-08 NOTE — PROGRESS NOTES
Infusion Nursing Note:  Odalys Nathan presents today for Gemzar and Mg Replacement.    Patient seen by provider today: Yes: Patricia   present during visit today: Not Applicable.    Note: Mg replaced per protocol and 1L NS given.  Plan to recheck Mg on Monday 8/12.    Intravenous Access:  Implanted Port.    Treatment Conditions:  Lab Results   Component Value Date    HGB 13.2 08/08/2019     Lab Results   Component Value Date    WBC 10.5 08/08/2019      Lab Results   Component Value Date    ANEU 7.4 08/08/2019     Lab Results   Component Value Date     08/08/2019      Lab Results   Component Value Date     08/08/2019                   Lab Results   Component Value Date    POTASSIUM 3.5 08/08/2019           Lab Results   Component Value Date    MAG 1.1 08/08/2019            Lab Results   Component Value Date    CR 0.57 08/08/2019                   Lab Results   Component Value Date    JOSE ALBERTO 8.6 08/08/2019                Lab Results   Component Value Date    BILITOTAL 1.2 08/08/2019           Lab Results   Component Value Date    ALBUMIN 2.8 08/08/2019                    Lab Results   Component Value Date    ALT 59 08/08/2019           Lab Results   Component Value Date    AST 25 08/08/2019       Results reviewed, labs MET treatment parameters, ok to proceed with treatment.      Post Infusion Assessment:  Patient tolerated infusion without incident.  Site patent and intact, free from redness, edema or discomfort.  No evidence of extravasations.  Access discontinued per protocol.       Discharge Plan:   AVS to patient via MYCHART.  Patient will return 8/12 for next appointment.   Patient discharged in stable condition accompanied by: .  Departure Mode: Ambulatory.    Sonia Velazco RN

## 2019-08-08 NOTE — LETTER
2019         RE: Odalys Nathan  [id number]  [facility] [address]        Dear Colleague,    Thank you for referring your patient, Odalys Nathan, to the [facility]. Please see a copy of my visit note below.    Gynecologic Oncology Follow-Up Note    RE: Odalys Nathan  MRN: [id number]  : 1960  Date of Visit: 2019    CC: Odalys Nathan is a 59 year old year old female with recurrent ovarian cancer who presents today for follow up regarding disease management.    HPI: Odalys comes to the clinic accompanied by her  Marcos. She has been feeling poorly for the past week with generalized abdominal pain, nausea, and emesis x2. Of note, she has never vomited with her disease or her treatment before. She does feel relief with vomiting. Not taking any anti-emetics because she feels better after vomiting. Notes constipation which is abnormal for her, typically has diarrhea- feels improvement in abdominal pain after having a bowel movement. Unable to tell if she is passing much flatus or not. Oral intake has been drastically reduced this past week- drinking fluids but not eating much solid food because of nausea. Also notes that the thought of swallowing pills and taking in food is unappealing to her right now which is more of a factor in her decreased intake than the nausea. Was just in [place], returned a week ago when her symptoms started. Reports that two of her fellow travelers had GI distress upon returning. Odalys denies fevers or chills. Her fatigue is worsening as well, able to complete ADLs and rest, but taking in the garbage cans this morning was about the extent of recent physical activity. She also notes worsening neoplastic pain in her RLQ. Taking ibuprofen every few days which used to be effective, however, is not providing great pain control anymore. She is hesitant to try oxycodone due to sedating side effects. No fevers,  chills, bleeding. Denies urinary concerns, SOB, chest pain. Declines palliative medicine referral at this time, would like to see if her symptoms improve with a new chemotherapy regimen.    Oncology History:  1/18/2012 - Admitted to hospital for 2 weeks of intermittent abdominal cramping, distention, diarrhea and N/V. CT of abdomen/pelvis significant for small bowel obstruction, a heterogenous soft tissue density in the pelvis, omental nodules, and ascites. Bilateral adnexal masses per U/S of pelvis. CA-125 was elevated at 987, CEA was normal at 1.0.    1/18/12 - Therapeutic paracentesis (4 L) with cytology confirming malignancy (MO positive, weak ER positive, CK-7 positive consistent with GYN primary). Surgery recommended d/t potential for falling blood counts 2/2 chemotherapy (patient is Orthodoxy and limiting blood transfusion)    2/1/12 - Exploratory laparotomy, MAR BSO, lysis of adhesion, Appendectomy, Repair cystotomy, Omentectomy Ufmy-oporif-kqvinrzak-transverse-colon resection, Ileo-descending colon anastomosis, CUSA, & Colonoscopy (done by Dr. Amato and colorectal team).  2/20/2012 - Admitted to hospital for bilateral pulmonary emboli and drainage of pleural effusion. Started on Lovenox.  2/28/12-3/21/12: Cycle #1-2 Carbo/Taxol IV  3/29/12 - Started on Keflex by her PCP for infection in her healing wound (immediately below her umbilicus).    4/11/12-6/14/12: Cycle #3-6 IV chemo, Cycle #1 IV/IP chemo  7/16/12 -  8, CT PRADEEP - enrolled in  - observation arm  3/4/13-6/28/13:  6, 9, 12, 15, 20.  7/1/13: CT Chest, Abdomen, Pelvis IMPRESSION:  1. Worsening metastatic ovarian carcinoma suggested by increased size of soft tissue nodules anterior to the right psoas muscle, that may represent growing mesenteric lymphadenopathy.  2. Remaining prominent right lower quadrant mesenteric lymph nodes are not significantly changed from CT 7/16/2012.  3. Clustered nodular opacities in the right lower  lobe are not significant change from 7/16/2012 and remain indeterminate. Again, the appearance and distribution is suggestive of an infectious etiology.  4. Stable 8 mm soft tissue nodule in left breast, unchanged since at least 02/20/2012. This can be observed on followup studies, but correlation with mammography could be considered.  Decision made to start Doxil/Carbo.  7/11/13: The left ventricular ejection fraction is normal at 66.4%.  7/12/13-9/6/13: Cycle #1-3 Doxil/Carbo.  10, 11.    9/30/13 CT C/A/P Impression:  1. Overall, favorable response to treatment with decreasing size of soft tissue nodules tracking along the anterior aspect of the right psoas muscle.  2. 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  3. Improved cluster of right lower lobe pulmonary nodular opacities. These may represent resolving infection.  10/3/13-12/9/13:  9, 8, 10. Cycle 4-6 Doxil/Carbo.    1/13/14 CT C/A/P Impression:   1. Stable appearance of metastatic ovarian cancer. Scattered soft tissue nodules along the anterior aspect of the right psoas muscle are unchanged in size. Mild mesenteric lymphadenopathy is unchanged.  2. Clustered micronodules in the right lower lobe are unchanged from 9/30/2013, but improved from 7/1/2013. This history suggests a postinflammatory/postinfectious etiology.  3. 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  1/16/14 Discussed multiple options for her based on relatively stable-appearing disease on CT but slight increase in  (which has had small increase to 20 with last recurrence) including chemo break with recheck of  in 1 month, starting new chemo agent immediately, and exploratory surgery with possible resection of nodules. She is considered platinum-sensitive based on > 1 year remission after Taxol/Carbo, which we will take into consideration for future chemo planning. I am not inclined to surgery at this time given difficulty she already has with diarrhea  secondary to past colon resection. I suspect we would need to resect further bowel due to mesenteric disease. Also explained inherent risks of any major surgery. Also mentioned maintenance chemo, but this has not been shown to increase overall survival and would likely decrease her quality of life without significant benefit. Family is going on vacation to [place] in 2 weeks and Odalys does not want to have chemo prior to that, so will plan to take 1 month break. She can have  at that time (discussed checking toady since last draw was in early December, but as it would likely not change treatment plan and she has h/o slow rising , will not check today).  2/17/14  16    2/20/14: Decision to take break from chemo for two months, followed by CT and CA-125.    4/21/14  27  4/21/14 CT C/A/P Impression:    1. Increased size of 2 low-attenuation lymph nodes anterior to the right psoas muscle is concerning for worsening metastatic ovarian cancer.    2. New circumferential thickening of a 3.8 cm length segment of distal transverse colon is likely physiologic. Recommend attention on followup imaging.    3. Grossly unchanged size of clustered small nodules versus scarring in the right lower lobe the lungs.    4. Stable thrombosis of the right ovarian vein.  5/14/14: Diagnostic laparoscopy converted to exploratory laparotomy and removal of mesenteric masses, tumor debulking, peritoneal biopsies and intraperitoneal port placement. On laparoscopy, it was noted that there were small nodules on the anterior abdominal wall near the previous incision, small nodules on the right pelvic sidewall as well. Nodules were palpated in the mesentery; however, as it was unable to clarify where the origin of the nodules was, the decision was made to open the patient. On opening there was found to be approximately a 3 cm nodule in the small bowel mesentery and another separate approximately 2 cm nodule in the bowel  "mesentery. Pelvis without evidence of cancer, some mesenteric lymph nodes were palpated. No evidence otherwise of any disseminated cancer throughout the abdomen.    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 (2/2)  -Largest metastasis: 1.3 cm  -See comment  D: Peritoneum, right paracolic gutter #2, biopsy:  -Fibroadipose tissue with granulomatous inflammation surrounding refractile material  -Negative for malignancy   E: Small bowel adhesion, biopsy:  -Fibroconnective tissue, consistent with adhesion  -Negative for malignancy  F: Lymph nodes, mesentry, not otherwise specified, excision:  -Two lymph nodes, negative for metastatic carcinoma (0/2)  G: Lymph node, mesentery, \"#2\", excision:  -One lymph node, negative for metastatic carcinoma (0/1)  H: Lymph nodes, mesentery, \"nodule #2\", excision:  -Five lymph nodes, negative for metastatic carcinoma (0/5)  COMMENT:  Some of the specimens show post-operative changes. Others show possible treatment related changes, including necrosis. The metastatic carcinoma in the mesenteric lymph nodes (specimen C) shows variable morphology, including relatively low grade tumor with papillary architecture, and high grade tumor comprised of nests of tumor cells with irregular, slit-like spaces and marked nuclear pleomorphism.    5/29/14: Cycle 1 IV PACLitaxel / IP CISplatin / IP PACLitaxel.  - 28.  6/26/14: Cycle #2 IV/IP.  10  8/5/14: CT chest/abd/pelvis IMPRESSION     1. In this patient with ovarian cancer, overall findings are indicative of stable/slight improvement, as multiple mesenteric lymphadenopathy and scattered nodular " peritoneal soft tissue mass lesions appear unchanged or slightly smaller since 4/21/2014.    2. 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 Mild dilatation of the second and the third portion of the duodenum with a narrow SMA angle. This could represent SMA syndrome, if clinically correlated.17/14:    Cycle #3 IV/IP.  10.    CT chest/abd/pelvis with contrast on 8/5/14    Impression:    1. In this patient with ovarian cancer, overall findings are indicative of stable/slight improvement, as multiple mesenteric lymphadenopathy and scattered nodular peritoneal soft tissue mass lesions appear unchanged or slightly smaller since 4/21/2014.    2. 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 Mild dilatation of the second and the third portion of the duodenum with a narrow SMA angle. This could represent SMA syndrome, if clinically correlated.  8/7/14: Cycle #4 Taxol/Carbo (changed from IV/IP).  10. She has been feeling okay. She is unsure if she can finish out the course of 6 cycles IV/IP taxol/cisplatin. She feels like she has the flu for about a week then starts feeling gradually better after each chemo cycle. Her spouse notes that she actually has been more sick with the treatments than she initially admits here. She was also previously having some rib pain. Denies any rib pain now. Denies any chest pain or shortness of breath.  Plan: discussed recent CT cap results and switching to just IV as she is feeling miserable with IP treatments. Switch to IV carbo/taxol as patient is platinum sensitive.  We also discussed her taking part of the tesaro trial, which would require BRCA testing. She would like to take part in this trial if eligible.  8/20/14: Remove Intraperitoneal Port ( Port and catheter intact - discarded)  8/28/14: Cycle #5 Taxol/Carbo held due to thrombocytopenia.  6.    She denies any vaginal bleeding, no changes in her bowel or bladder habits, no  nausea/emesis, no lower extremity edema, and no difficulties eating or sleeping. She denies any abdominal discomfort/bloating, no fevers or chills, and no chest pain or shortness of breath. She states her diarrhea is the same. She reports some fatigue which improves about 1-2 weeks after her chemotherapy. She states she does not need any medication refills and she was told she does not meet the criteria for the TESARO trial. She states she has 3 bags of iv fluids left over from her previous chemotherapy and will give these to herself. She states she is ready for her treatment today.    9/29/14: Cycle #6 Taxol/Carbo  6. Insurance questions regarding GSF coverage today. No concerns other than fatigue. Taking iron for anemia and does not desire blood transfusion. Using neulasta for neutropenia. Using home IV hydration if needed. Baseline unchanged. No abdominal bloating, constipation, diarrhea, pain, vaginal or rectal bleeding, cough or dyspnea, fluid retention.    10/16/14: Impression:    1. Nodular peritoneal soft tissue mass in the right lower quadrant adjacent to the psoas muscle is no longer appreciated. Adjacent prominent lymphadenopathy is unchanged from previous exam. No new peritoneal lesions.    2. 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     10/20/14:  5. CT chest/abdomen/pelvis on 10/16/14 showed nodular peritoneal soft tissue mass in the right lower quadrant adjacent to the psoas muscle is no longer appreciated. Adjacent prominent lymphadenopathy is unchanged from previous exam. 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  1/27/15:  6.  4/28/15:  14.  5/26/15:  18.  6/2/15: CT cap Impression:  1. Postsurgical changes of hysterectomy and bilateral salpingo-oophorectomy for ovarian cancer. There is a new 8 mm hazy, ill-defined hypoattenuating lesion in hepatic segment 6 which is suspicious for a metastatic deposit. Further  evaluation with ultrasound in recommended.    2. Increased size of a left retroperitoneal lymph node which is indeterminate but may represent a ciro metastasis. Mildly prominent lymph nodes in the right lower quadrant are not significantly changed.  3. Moderate colonic stool burden.    6/4/15: US abdomen IMPRESSION:    Hyperechoic lesion in the right hepatic lobe, consistent with hemangioma. This does not corresponds to the area of the lesion seen on CT from 6/2/2015. An MR would be helpful for identifying and characterizing the lesion from the recent CT.  6/12/15: MR abd IMPRESSION:  1. New 20 x 11 mm enhancing lesions between the right obliques, concerning for metastatic disease. This lesions should be amenable to percutaneous biopsy, if indicated.  2. Correlating to the lesion visualized on comparison CT is a hepatic segment 6 subcapsular 7 mm lesion. Overall the appearance favors the diagnosis of a simple cyst. However, there is faint suggestion of mild peripheral arterial enhancement. Although this is favored as  artifactual, this should be followed up to confirm stability. Recommend 6 month followup.  3. Hepatic segment 6, 5 mm lesion too small to technically characterize. Differential would favor FNH, less likely flash filling hemangioma. Recommend attention on followup.  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      9/1/15: Cycle #1 Avastin/Cytoxan.   31.      9/24/15:feeling generally well. She says she has been having back and stomach spasms. She is taking cytosine daily (she ran out yesterday). She also says its affecting her voice. Admits that it burns occasionally. She is eating and drinking normal. She also admit diarrhea, 5-7 times daily, lose/watery. She trying to stay hydrated and eat fiber. She also says that her body is sore, especially the bottom of her  feet. Her blood pressure is normal. She also admits having headache after her first infusion.       9/24/15: Cycle #2 Avastin/Cytoxan.  19.  10/15/15: Cycle #3 Avastin/Cytoxan.  16.      11/6/15: CT c/a/p IMPRESSION:    1. Stable postoperative change of MAR/BSO for ovarian cancer.  2. The lesion in the right flank abdominal musculature is slightly decreased in size. Otherwise, stable examination.  2. No evidence of metastatic disease in the chest.     11/20/15: Treatment planning visit,  16     11/25/15: surgical pathology report  FINAL DIAGNOSIS:  Soft tissue, right oblique muscle mass, excision:  -Recurrent ovarian serous carcinoma  -Carcinoma is present less than 1 mm from one resection margin  -Background skeletal muscle and fibroadipose tissue     1/4/16:  22  1/11/16-1/27/16: Radiation to right flank x 12 treatments  4/7/2016:  94. CT cap IMPRESSION:    In this patient with ovarian cancer status post MAR/BSO and descending/transverse colectomy:  1. No evidence for malignancy in the chest, abdomen, or pelvis.  2. Stable small hypodense segment 6 liver lesion, appears more likely benign, possibly a cyst.  5/13/16:  124.  6/3/16: PET CT IMPRESSION: In this patient with a history of ovarian cancer:  1. Hypermetabolic and enlarging periaortic and perihepatic lymphadenopathy compatible with metastatic disease, as detailed above.  2. Although hypodense lesion in hepatic segment 6 has been present since 6/2/2015 associated hypermetabolism makes this lesion highly concerning for metastatic disease.     Plan: to start Niraparib under TESARO study.      6/9/16:  137.  6/14/16: Cycle #1 Niraparib.    6/28/16: Cycle #1 D15 Niraparib.    7/5/16:  100.    7/11/16: Cycle #2 Nraparib.   83.     8/3/2016: PET CT IMPRESSION:    In this patient with known history of ovarian cancer:  1) New pleural based nodular opacities in the lateral and inferior aspects of the bilateral  lower lobes, worse in the left lung. Likely infection. Close follow up is recommended.      2) Slight decrease in hypermetabolic abdominal lymphadenopathy. 2 hypermetabolic lymph nodes persist.  3) Unchanged right hepatic lobe metastatic lesion.      8/9/16: Cycle #3 Niraparib.  69.  9/6/16: Cycle #4 Niraparib.  53. Dose held due to anemia.  9/13/16: Eval for potential cycle 4 niraparib. Dose held due to anemia.  10/4/16: CT CAP impression:  IMPRESSION: In this patient with a known history of ovarian cancer:  1. There has been interval resolution of pleural-based nodular  opacities which likely represented infection.  2. Abdominal lymphadenopathy in the form of 2 portacaval lymph nodes  have not significantly changed in size, noted to be hypermetabolic on  prior PET/CT.  3. Previously demonstrated metastatic lesion in the right lobe of the  liver is not significantly changed.  10/11/16:  60  11/1/16: C6 niraparib,  75  11/29/16: C7 niraparib.  78. CT CAP impression as follows:  Target lesions (RECIST criteria):       A previously described target lesion superior to the head of the  pancreas (series 2, image 64)  (referred to as a perihepatic node on  6/3/2016) may not be a valid target lesion because it measured less  than 1.5 cm originally. However, this particular node has decreased in  size, now measuring 7 mm in short axis versus 14 mm on 6/3/2016 when  measured in a similar fashion.       2.3 cm short axis portacaval lymph node on series 2 image 67,  previously 2.0 cm on 10/4/2016.       1.2 cm subtle hypodensity in hepatic segment 6 on series 2 image 75,  stable on multiple studies since at least 6/3/2016     Sum of diameters today: 3.5 cm. Sum of diameters 10/4/2016: 3.2 cm.  Growth = 9%.    12/27/16: C8 niraparib.  105.  1/25/17: C9 niraparib.  108.  2/23/17: C10 niraparib.  132.   CT CAP impression:  Sum of target lesion diameters today: 3.7 cm. Sum of target  lesion  diameters on 11/28/2016: 3.5 cm. Growth= 6%  1. In this patient with history of ovarian cancer there is stable  disease by RECIST criteria as evidenced by:   1a. Mildly increased size of liver metastasis.  1b. Stable portacaval lymphadenopathy.  1c. No evidence of metastatic disease in the chest.  2. Trace emphysematous changes of the lungs.  3/22/17: C11 niraparib.  132.  4/19/17: C12 niraparib.  127.  5/16/17: CT CAP IMPRESSION: In this patient with ovarian cancer:  1. Mildly increased size of hepatic metastasis segment 6 with subtle increased capsular retraction.  2. Stable edmundo hepatis nodes, with mild increase in size of periaortic lymph nodes.  3. Prominent left supraclavicular lymph node with subtle increase in size compared to prior studies, particularly comparing to 10/4/2016.  4. Mild subtle groundglass opacities in the right upper lobe, not present on prior study, lesser extent in the right lower lobe.  Findings may represent infection, additional consideration is malignancy (less likely), and attention on follow-up study  Recommended.  Addendum:   Prominent left supraclavicular lymph node (3/25) is stable from most recent CT performed 2/20/2017, currently measuring 14 x 16 mm, previously 14 x 16 mm on 2/20/2017.      The portal caval lymph node/edmundo hepatis lymph node is stable from 2/20/2017, measuring 21 mm.      Clarification of size of the para-aortic lymph node (series 3 image 327). It short axis measurement is 11 mm versus 9 mm on prior study.      Hepatic segment 6 triangular-shaped low density lesion (3/362) measures 14 mm, previously measured 12 mm, demonstrating a  possible/questionable minimal subtle increase in size. Similarly the lymph nodes noted above in the supraclavicular and para-aortic regions demonstrate possible minimal possible subtle increase in size.      5/18/17: Cycle #13 Niraparib.  191.  6/15/17: Cycle #14 Niraparib.  146.  7/13/17: Cycle #15  Niraparib 200 mg.  171     CT (8/9/17):       IMPRESSION:  1. Segment 6 hepatic metastasis is stable to minimally increased in size.  2. Slight increase in multicentric adenopathy, most pronounced at the edmundo hepatis. Additional sites include the inferior left neck/supraclavicular region and retroperitoneum which appear stable to  minimally increased.      8/10/17:  175  9/14/17: MUGA LVEF 54%  9/22/17: C1D1 carboplatin/Doxil.  187.  10/19/17: C2D1 carboplatin/Doxil.  108.  11/17/17: C3D1 carboplatin/Doxil.  82.  12/14/17: C4D1 carboplatin/Doxil/avastin.  83.  Of note, avastin held on C4D14  1/12/18: C5D1 carboplatin/Doxil/avastin.  80.  1/26/18: platelets 61, patient was not given avastin due to being jehova's witness.   2/9/18: C6D1 carboplatin/Doxil/Avastin.   71.  3/2/18:  49. Three month treatment break.  6/20/2018:  170. CT CAP:  IMPRESSION: In this patient with history of ovarian cancer:  1. Increased size of a right hepatic lobe lesion and numerous edmundo hepatis and retroperitoneal lymph nodes compatible with progression of metastatic disease.  2. New mild intrahepatic biliary ductal dilatation, periportal edema, and pericholecystic fluid. Increased soft tissue fullness in the edmundo hepatis in the expected location of the common hepatic duct. Findings  are suspicious for developing biliary ductal obstruction secondary to worsening metastatic disease in the edmundo hepatis. Correlation with liver function tests is recommended. Right upper quadrant ultrasound  may be beneficial.  3. Unchanged left supraclavicular and right hilar lymphadenopathy in the chest.      8/3/18: C1D1 weekly paclitaxel.  not done.  9/20/18: C2D1 weekly paclitaxel 80mg/m2. Ca 125-56  11/8/2018: C3D1 weekly paclitaxel;  26     12/17/18: Ct cap IMPRESSION:  1. New area of lobulated hypodense nodularity at the far-inferior right liver. This raises the possibility of a  new area of hepatic metastasis.  2. Conversely, other nodules within the right liver are smaller suggesting improvement.  3. Improved adenopathy at the abdominal retroperitoneum. Improved left  supraclavicular adenopathy. Stable mildly prominent right hilar lymph nodes.  4. Previously noted ill-defined soft tissue at the edmundo hepatis region is still present but appears less prominent in size.  5. New finding of segmental wall thickening of the proximal sigmoid colon with some fluid distention of the adjacent colon. This could represent a segmental colitis. Other etiologies not excluded.     12/20/18:   19.  1/22/19:  45.  3/20/19: CT cap IMPRESSION:  1. Progression of disease with increasing size of a right inferior hepatic mass consistent with metastatic disease.  2. Increasing ill-defined multifocal regions of soft tissue at the edmundo hepatis consistent with malignant adenopathy versus malignant implants. Associated increased intrahepatic biliary ductal dilatation.  3. Other areas of progressive adenopathy noted at the left neck base, mediastinum, and abdominal retroperitoneum.  4. New area of carcinomatosis medial to stomach at the left upper abdomen.     3/20/19: Cycle #1 weekly paclitaxel.   180.  5/7/19: Cycle #2 weekly paclitaxel.    142.  6/20/19: Cycle #3 weekly paclitaxel/  259.     7/11/19: CT CAP-IMPRESSION:  1. Slight increased size of the left supraclavicular lymph node.  Slight progression of the mediastinal lymph nodes is also evident. New  periesophageal node as described above. Retroperitoneal nodes are  stable if not slightly smaller. Some of these nodes appear to have  partially calcified suggesting a response to interval therapy.  2. Interval decreased size of the inferior right hepatic lobe lesion  now with an area of central calcification or enhancement. No new liver  lesions. Remaining abdominal organs are grossly unremarkable. No  significant  hydronephrosis.  3. Postop changes involving the bowel and pelvic region. No recurrent  pelvic mass.                 Past Medical History:   Diagnosis Date     Antiplatelet or antithrombotic long-term use      Ascites      Blood clot in the legs      Diabetes (H)      Ovarian cancer (H)     serous,stg IV     Pleural effusion      Pulmonary embolism (H) 2/2012     Refusal of blood transfusions as patient is Latter day      Short gut syndrome      Subclinical hypothyroidism 4/18/2013     Thrombosis of leg        Past Surgical History:   Procedure Laterality Date     COLECTOMY       COLONOSCOPY  2/1/2012    Procedure:COLONOSCOPY; With Biopsy; Surgeon:TONI SULLIVAN; Location[facility]     HYSTERECTOMY TOTAL ABD, RHIANNA SALPINGO-OOPHORECTOMY, NODE DISSECTION, TUMOR DEBULKING, COMBINED  2/1/2012    Procedure:COMBINED HYSTERECTOMY TOTAL ABDOMINAL, BILATERAL SALPINGO-OOPHORECTOMY, NODE DISSECTION, TUMOR DEBULKING;  Exploratory Laparotomy, Total Abdominal Hysterectomy, Bilateral Salpingo-Oophorectomy, appendectomy,lysis of adhesions, ileal, ascending, transverse and splenic flexure resection, ileal descending bowel renanastomosis, incidental cystotomy repair, CUSA procedure and colonoscopy ; [facility]     INSERT PORT PERITONEAL ACCESS  4/3/2012    Procedure:INSERT PORT PERITONEAL ACCESS; Intraperitoneal Port Placement (c-arm); Surgeon:SAMUEL CARRASCO; Location:[facility]     INSERT PORT PERITONEAL ACCESS  5/14/2014    Procedure: INSERT PORT PERITONEAL ACCESS;  Surgeon: Nga Yeung MD;  Location: [facility]     INSERT PORT VASCULAR ACCESS       LAPAROSCOPY DIAGNOSTIC (GYN)  5/14/2014    Procedure: LAPAROSCOPY DIAGNOSTIC (GYN);  Surgeon: Nga Yeung MD;  Location: [facility]     LAPAROTOMY EXPLORATORY Right 11/25/2015    Procedure: LAPAROTOMY EXPLORATORY;  Surgeon: Nga Yeung MD;  Location: [facility]     LAPAROTOMY, TUMOR DEBULKING, COMBINED  5/14/2014    Procedure: COMBINED LAPAROTOMY, TUMOR DEBULKING;  Surgeon:  Nga Yeung MD;  Location: [facility]     REMOVE CATHETER PERITONEAL N/A 8/20/2014    Procedure: REMOVE CATHETER PERITONEAL;  Surgeon: Nga Yeung MD;  Location: [facility]     VASCULAR SURGERY      stent left iliac vein       Current Outpatient Medications   Medication     albuterol (PROAIR HFA/PROVENTIL HFA/VENTOLIN HFA) 108 (90 Base) MCG/ACT inhaler     Ascorbic Acid (VITAMIN C PO)     Calcium Carbonate-Vitamin D (CALCIUM + D PO)     cyanocobalamin (VITAMIN B12) 1000 MCG/ML injection     diphenoxylate-atropine (LOMOTIL) 2.5-0.025 MG per tablet     Ferrous Sulfate 324 (65 Fe) MG TBEC     HEMP OIL OR EXTRACT OR OTHER CBD CANNABINOID, NOT MEDICAL CANNABIS,     HERBALS     iohexol (OMNIPAQUE) 140 MG/ML solution for oral use     LEVOTHYROXINE SODIUM PO     loperamide (IMODIUM) 2 MG capsule     LORazepam (ATIVAN) 1 MG tablet     magnesium oxide (MAG-OX) 400 MG tablet     omeprazole (PRILOSEC) 20 MG DR capsule     order for DME     potassium chloride (KLOR-CON) 20 MEQ packet     VITAMIN E NATURAL PO     No current facility-administered medications for this visit.      Facility-Administered Medications Ordered in Other Visits   Medication     heparin 100 UNIT/ML injection 500 Units       No Known Allergies    Family History   Problem Relation Age of Onset     Cancer Mother 69        lung, smoker     Cancer Maternal Uncle 65        brain     Colon Cancer Maternal Aunt 80        colon       Social History     Socioeconomic History     Marital status:      Spouse name: Not on file     Number of children: Not on file     Years of education: Not on file     Highest education level: Not on file   Occupational History     Not on file   Social Needs     Financial resource strain: Not on file     Food insecurity:     Worry: Not on file     Inability: Not on file     Transportation needs:     Medical: Not on file     Non-medical: Not on file   Tobacco Use     Smoking status: Former Smoker     Years: 8.00     Types:  "Cigarettes     Last attempt to quit: 1980     Years since quittin.1     Smokeless tobacco: Never Used     Tobacco comment: started at 11 yo and quit at 18 yo   Substance and Sexual Activity     Alcohol use: Yes     Comment: 3x/day wine or jodi     Drug use: No     Sexual activity: Not on file   Lifestyle     Physical activity:     Days per week: Not on file     Minutes per session: Not on file     Stress: Not on file   Relationships     Social connections:     Talks on phone: Not on file     Gets together: Not on file     Attends Sabianism service: Not on file     Active member of club or organization: Not on file     Attends meetings of clubs or organizations: Not on file     Relationship status: Not on file     Intimate partner violence:     Fear of current or ex partner: Not on file     Emotionally abused: Not on file     Physically abused: Not on file     Forced sexual activity: Not on file   Other Topics Concern     Parent/sibling w/ CABG, MI or angioplasty before 65F 55M? Not Asked   Social History Narrative     Not on file           ROS  12 point ROS reviewed and negative except as listed in HPI      Physical Exam:    /74   Pulse 109   Temp 97.4  F (36.3  C) (Oral)   Resp 19   Ht 1.651 m (5' 5\")   Wt 59.9 kg (132 lb)   SpO2 98%   BMI 21.97 kg/m       CONSTITUTIONAL: Alert non-toxic appearing female in no acute distress  HEAD: Normocephalic, atraumatic  EYES: PERRLA; no scleral icterus  ENT: Oropharynx pink without lesions  NECK: Neck supple, firm fixed left supraclavicular node roughly 3cm x 4cm, non-tender without erythema or edema, not eroding through skin  RESPIRATORY: Lungs clear to auscultation, no increased work of breathing noted  CV: slightly tachycardic, regular rhythm, S1S2, no clicks, murmurs, rubs, or gallops; bilateral lower extremities without edema, dorsalis pedis pulses 2+ bilaterally  GASTROINTESTINAL: Normoactive bowel sounds x4 quadrants, abdomen soft, slightly " distended, and non-tender to palpation without masses or organomegaly, no rebound tenderness, guarding, or rigidity  GENITOURINARY: Not indicated  LYMPHATIC: Cervical, supraclavicular, and inguinal nodes without lymphadenopathy  MUSCULOSKELETAL: Moves all extremities, no obvious muscle wasting  NEUROLOGIC: No gross deficits, normal gait  SKIN: Appropriate color for race, warm and dry, no rashes or lesions to unclothed skin  PSYCHIATRIC: Pleasant and interactive, affect bright, makes appropriate eye contact, thought process linear    Labs:      8/8/2019  Day 1   Hemoglobin 11.7 - 15.7 g/dL 13.2   Hematocrit 35.0 - 47.0 % 39.7   Platelet Count 150 - 450 10e9/L 320   Absolute Neutrophil 1.6 - 8.3 10e9/L 7.4   Sodium 133 - 144 mmol/L 132 (A)   Potassium 3.4 - 5.3 mmol/L 3.5   Chloride 94 - 109 mmol/L 99   Carbon Dioxide 20 - 32 mmol/L 24   Urea Nitrogen 7 - 30 mg/dL 10   Creatinine 0.52 - 1.04 mg/dL 0.57   Calcium 8.5 - 10.1 mg/dL 8.6   Magnesium 1.6 - 2.3 mg/dL 1.1 (A)   Bilirubin Total 0.2 - 1.3 mg/dL 1.2   ALT 0 - 50 U/L 59 (A)   AST 0 - 45 U/L 25   Alkaline Phosphatase 40 - 150 U/L 677 (A)   Albumin 3.4 - 5.0 g/dL 2.8 (A)   Protein Total 6.8 - 8.8 g/dL 7.0   WBC 4.0 - 11.0 10e9/L 10.5   Abdominal x-ray negative for SBO   pending    Assessment/Plan:  1) Recurrent ovarian cancer: Nausea, vomiting, and abdominal pain- abdominal x-ray negative for SBO. Question worsening disease (has been off treatment since 7/11/19) vs gastroenteritis. Gastroenteritis less likely given that she has been afebrile, has not had diarrhea, and her WBC count is within normal limits. Hemodynamically stable. Proceed with C1D1 gemcitabine as ordered by Dr. Yeung. To receive three cycles followed by imaging and treatment planning with Dr. Yeung. Reviewed signs and symptoms for when she should contact the clinic or seek additional care, including but not limited to fever, chills, inability to keep down food or fluids, nausea and vomiting  not controlled with antiemetics, and diarrhea leading to dehydration. Patient to contact the clinic with any questions or concerns in the interim. Discussed her recurrent disease and worsening symptoms of disease burden- declines palliative medicine referral at this time but states she will continue to consider this.  2) Chemotherapy/disease effects:   Nausea, vomiting, low appetite: Disease vs gastroenteritis- suspect the former. We discussed trying anti-emetics prior to oral intake and to work on eating small frequent meals.   Abdominal pain: Abd x-ray negative for SBO. Suspect due to worsening disease but does feel better after BMs, may be constipated. Worsening RLQ pain, ibuprofen no longer helpful. Hesitant to try oxycodone due to sedating effects. Open to trying tramadol which may potentially be less sedating but discussed that she will still likely notice some sedation with this- tramadol 25mg-50mg PO BID PRN. To call clinic if this is ineffective. Discussed palliative medicine referral, declines at this time. She would like to see if her pain improves with starting a new treatment regimen and a new pain medication. Reviewed safety with tramadol.    Fatigue: Worsening over time. Continue with physical activity and periods of rest. Declines palliative referral at this time.   Hyponatremia: Suspect she may be slightly dehydrated, reports she was sweating a lot in [place] and not drinking as much as normal. NS 1L IV x1 in infusion today, to take in fluids containing sodium.   Hypomagnesemia: Slight worsening of chronic issue- stopped taking magnesium supplement this week due to not feeling well. To be replaced in infusion, restart oral supplement, and have lab recheck and possible infusion in 3-4 days.   Elevated LFTs: Known hepatic metastasis, LFTs stable. Continue to monitor.  3) Genetic counseling: Panel testing negative  4) Health maintenance issues discussed include to follow up with PCP for non-gynecologic  concerns and co-morbid conditions  5) Patient verbalized understanding of and agreement with plan    A total of 25 minutes of face to face time were spent with the patient with over 50% of that time spent in counseling, coordination of care, education, and symptom management.    HAILE De Paz, FNP-C  Division of Gynecologic Oncology  [facility]  Pager: [phone]            Again, thank you for allowing me to participate in the care of your patient.        Sincerely,        HAILE De Paz CNP

## 2019-08-09 NOTE — TELEPHONE ENCOUNTER
Prior Authorization Approval    Authorization Effective Date: 7/10/2019  Authorization Expiration Date: 8/8/2022  Medication: tramadol 50mg tablets  Approved Dose/Quantity: 30 tablets for 15 days  Reference #: [id number]   Insurance Company: [employer] - Phone [phone] Fax [phone]  Expected CoPay:       CoPay Card Available: No    Foundation Assistance Needed: n/a  Which Pharmacy is filling the prescription (Not needed for infusion/clinic administered): [id number] - [facility] [address] [facility]  Pharmacy Notified: Yes, they filled #14 tablets on 8/8, I have to call for the pharmD once hes back in the pharmacy to see if they can backdate it so they can fill the full qty.  Patient Notified: Yes

## 2019-08-12 NOTE — PROGRESS NOTES
Called Odalys. UA concerning for ketones and bilirubin. CMP shows hyponatremia, hypokalemia, elevated bilirubin. Reviewed with Dr. Yeung- obtain RUQ US to further evaluate for obstruction. Discussed ketones and concern for dehydration/starvation- she states she will attempt to take in more orally, has protein shakes at home and knows to premedicate if she is nauseated. Also reviewed her hypokalemia- already home from infusion, will replace orally with potassium chloride 40mEq PO x1, sent to [facility] in [place]. To take with food. Odalys will return in three days for chemotherapy and labs/lytes/IV fluids. To call with questions or concerns. She verbalized understanding of and agreement with plan.  HAILE De Paz, FNP-C  Gynecologic Oncology  [facility]  Pager: [phone]

## 2019-08-12 NOTE — PROGRESS NOTES
Infusion Nursing Note:  Odalys Nathan presents today for IV fluids/labs/Magnesium.    Patient seen by provider today: No   present during visit today: Not Applicable.    Note: Continues to have c/o abdominal pain.  Ultram effective.  Continues to have some nausea with emesis.  Appetite decreased.  Has c/o blood in urine with some burning with urination. Obtained order for UA today.    Intravenous Access:  Lab draw site port, Needle type port, Gauge 20 3/4in.  Labs drawn without difficulty.  Implanted Port.    Treatment Conditions:  Not Applicable.      Post Infusion Assessment:  Patient tolerated infusion without incident.  Blood return noted pre and post infusion.  No evidence of extravasations.  Access discontinued per protocol.       Discharge Plan:   Discharge instructions reviewed with: Patient.  Patient discharged in stable condition accompanied by: .  Departure Mode: Ambulatory.  Scheduled for labs and treatment on 8/15/189.    ARNOLD GILLETTE RN

## 2019-08-15 NOTE — PROGRESS NOTES
Reviewed US results with Odalys and Dr. Yeung. Per Dr. Yeung, GI referral- orders placed for this and request sent to scheduling.

## 2019-08-15 NOTE — PROGRESS NOTES
Infusion Nursing Note:  Odalys Nathan presents today for Gemzar, Mag, K, IVF.    Patient seen by provider today: Yes: Patricia   present during visit today: Not Applicable.    Note: 4 GM Mag mixed in 1100 ml NS to run over 2 hours.  Due to time constraint, only 800 ml infused over 1.7 hours.  Per Patricia, pt should take an additional 400 mg Mag oxide today at home    Intravenous Access:  Implanted Port.    Treatment Conditions:  Lab Results   Component Value Date    HGB 11.7 08/15/2019     Lab Results   Component Value Date    WBC 6.5 08/15/2019      Lab Results   Component Value Date    ANEU 3.5 08/15/2019     Lab Results   Component Value Date     08/15/2019      Lab Results   Component Value Date     08/15/2019                   Lab Results   Component Value Date    POTASSIUM 3.3 08/15/2019           Lab Results   Component Value Date    MAG 1.1 08/15/2019            Lab Results   Component Value Date    CR 0.50 08/15/2019                   Lab Results   Component Value Date    JOSE ALBERTO 8.8 08/15/2019                Lab Results   Component Value Date    BILITOTAL 1.2 08/15/2019           Lab Results   Component Value Date    ALBUMIN 2.5 08/15/2019                    Lab Results   Component Value Date    ALT 47 08/15/2019           Lab Results   Component Value Date    AST 39 08/15/2019       Results reviewed, labs MET treatment parameters, ok to proceed with treatment.      Post Infusion Assessment:  Patient tolerated infusion without incident.  Blood return noted pre and post infusion.  Site patent and intact, free from redness, edema or discomfort.  No evidence of extravasations.  Access discontinued per protocol.       Discharge Plan:   Discharge instructions reviewed with: Patient and Family.  AVS to patient via [employer].  Patient will return 8/29 for next appointment.   Patient discharged in stable condition accompanied by: self and .  Departure Mode: Ambulatory.    Keisha URBINA  Marty RN

## 2019-08-15 NOTE — PROGRESS NOTES
Reviewed Ultrasasound and labs with Dr Yeung today.  GI medicine appt at [facility] on 9/12.   Hold future chemotherapy until GI Medicine consult is completed.   Checking in with new consults for GI medicine according to there new patient information because of her elevated bili of 1.9 she should go to endoscopy.  Called endoscopy and they will review the chart in the morning and let patient know about a possible GI medicine consult at the  or [place] sooner then 9/12 at [facility].   Dr Yeung also requested a HIDA scan which is schedule for Monday at 230 8/19/19.  patient notified of the above and left a message.   Tammy CISNEROS, RN, OCN  Care Coordinator   Gynecologic Cancer   Office [phone]

## 2019-08-15 NOTE — PROGRESS NOTES
"S: Briefly saw Odalys in infusion to follow up. She is feeling \"fair to [place]\" today- continues with RUQ pain, states Tramadol 50mg has been helpful, wondering about increasing this dose. No significant side effects with it. Continues with nausea, taking anti-emetics with relief. Able to eat and take in protein shakes if she pre-medicates with anti-emetics. No fevers or chills. Some constipation which is unusual for her, stools light brown in color but denies sohail colored stools. Has GI appt set up for 9/12/19 to follow up on intrahepatic biliary duct dilatation, portal vein stenosis, and cholelithiasis.     O: VS reviewed. Bilirubin decreased to 1.2 but alk phos 480--> 965. ALT/AST WNL. Alert and oriented, resting comfortably in chair in no acute distress. No scleral icterus. Normoactive BS, abdomen soft and non-distended. RUQ tender to palpation but negative Najera's sign.    A/P:  Elevated alk phos: Afebrile without evidence of acute infection (US findings equivocal). Requested GI appt to be moved up ASAP. Weekly CMP per Dr. Yeung. Reviewed s/sxs infection and when to seek emergency care. Does have RUQ pain, likely related to her known hepatic mets- stop tramadol and start oxycodone 5mg q6h PRN, reviewed safety with this. Continue anti-emetics and pre-medicating before meals. Palliative medicine referral.     HAILE De Paz, FNP-C  Gynecologic Oncology  [facility]  Pager: [phone]    "

## 2019-08-15 NOTE — TELEPHONE ENCOUNTER
Patient participating in the TESARO study ([id number]). Called patient to inform her about sponsor communication of follow-up discontinuation. A letter will be sent to her address. Informed patient to call back if she has any questions or concerns. Patient voiced understanding.    Leann King  Clinical Research Coordinator  [phone]

## 2019-08-19 NOTE — TELEPHONE ENCOUNTER
patient calling for refill of prilosec for the patient  Last visit with MD 8/8/19  Last order date    omeprazole (PRILOSEC) 20 MG DR capsule 30 capsule 4 3/25/2019  No   Sig - Route: Take 1 capsule (20 mg) by mouth daily. - Oral   Sent to pharmacy as: omeprazole (PRILOSEC) 20 MG DR capsule         Please advise on refills for patient

## 2019-08-19 NOTE — PROGRESS NOTES
Care Coordination New Patient Referral  [facility]    NP referral date: 08/19/19  Referred to MD:  [facility]    Referring MD: ELISHA Yeung/Patricia Rocha NP  Referring contact: in basket message from staff  Referral for evaluation of biliary dilation    Diagnosis: biliary dilation   Elevated LFTs: Known hepatic metastasis, LFTs stable in setting of metastatic ovarian cancer  Per referring provider:  Rising alk phos and the findings on ultrasound of cholelithiasis with possible cholecystitis, portal vein stenosis, and intrahepatic biliary duct dilatation. She did have an elevated bili but this came back down. She is becoming more symptomatic with RUQ and nausea/vomiting, wondering if she needs an MRCP or a stent at all?     Imaging:   CT multiple - most recent 7/11/19  HIDA scan 8/19/19    Referral has been reviewed by Dr. Lloyd.  He spoke with the patient 8/21/19 to discuss results for HIDA scan.     patient has been contacted with OR date and time and time of arrival to .       Eryn KIRANN, HNBC  RN Care Coordinator  [facility]  Ph: [phone]  Email: [email or web address]

## 2019-08-21 NOTE — TELEPHONE ENCOUNTER
Referral received from Dr. Yeung in Gyn/Onc and reviewed. Patient with metastatic ovarian cancer with mets to the liver on chemo who has been having worsening RUQ pain for the past 1.5 months. RUQ ultrasound showed cholelithiasis and borderline wall thickening. CBD 8 mm. HIDA consistent with chronic cholecystitis with delayed gallbladder filling. She had mild Tbili bump on 8/12 to 1.9 and rising alk phos. Chemo is being held until chronic cholecystitis is resolved. I called the patient on the phone and reviewed this history with her. She is due for chemo next week. In an effort to expedite care and minimize delay in chemo, I recommended ERCP with transpapillary gallbladder stenting (will clear main duct as well for low probability choledocholithiasis). I discussed the risk/benefits of this approach and explained about an 80% success rate. I also explained that if we are unsuccessful then I would recommend lap jose by surgery. She was in agreement with this plan and we will proceed with scheduling her for an ERCP this week.    Catrachito Lloyd MD  [facility]  Division of Gastroenterology and Hepatology  [facility] [address], [place] [address]

## 2019-08-22 NOTE — PROGRESS NOTES
Infusion Nursing Note:  Odalys Nathan presents today for port labs.    Patient seen by provider today: No   present during visit today: Not Applicable.    Note: Patient requesting to wait for lab results and states she often needs magnesium replacement. Standing therapy plan for IVF and electrolyte replacement ordered. Confirmed with charge nurse we have availability for adding patient to infusion schedule today. Writer encouraged patient to schedule infusion appt with her lab appt in future and we can always cancel appt if she doesn't need electrolyte replacement. Patient verbalized understanding.     Patient reports she's having an ERCP tomorrow and requesting to leave port needle in place. Per Patricia Rocha NP, deaccess patient today after infusion since we are unsure if they will use port tomorrow and have heparin or supplies for use.       Patient magnesium 1.2 Going to infusion for IVF and replacement.     Intravenous Access:  Labs drawn without difficulty.  Implanted Port.    Treatment Conditions:  Lab Results   Component Value Date     08/22/2019                   Lab Results   Component Value Date    POTASSIUM 3.4 08/22/2019           Lab Results   Component Value Date    MAG 1.2 08/22/2019            Lab Results   Component Value Date    CR 0.52 08/22/2019                   Lab Results   Component Value Date    JOSE ALBERTO 8.8 08/22/2019                Lab Results   Component Value Date    BILITOTAL 1.0 08/22/2019           Lab Results   Component Value Date    ALBUMIN 2.8 08/22/2019                    Lab Results   Component Value Date     08/22/2019           Lab Results   Component Value Date    AST 78 08/22/2019       Magnesium 1.2.    Post Infusion Assessment:  Patient tolerated blood draw without incident.  Blood return noted pre and post infusion.  Site patent and intact, free from redness, edema or discomfort.  No evidence of extravasations.  Port left accessed for  infusion.       Discharge Plan:   Patient discharged in stable condition to [facility] awaiting infusion appt accompanied by: self.  Departure Mode: Ambulatory.    Loree Ambriz, RN, RN

## 2019-08-22 NOTE — PROGRESS NOTES
Infusion Nursing Note:  Odalys Nathan presents today for IVF, Mag replacement.    Patient seen by provider today: No   present during visit today: Not Applicable.    Note: N/A.    Intravenous Access:  Implanted Port.    Treatment Conditions:  Lab Results   Component Value Date     08/22/2019                   Lab Results   Component Value Date    POTASSIUM 3.4 08/22/2019           Lab Results   Component Value Date    MAG 1.2 08/22/2019            Lab Results   Component Value Date    CR 0.52 08/22/2019                   Lab Results   Component Value Date    JOSE ALBERTO 8.8 08/22/2019                Lab Results   Component Value Date    BILITOTAL 1.0 08/22/2019           Lab Results   Component Value Date    ALBUMIN 2.8 08/22/2019                    Lab Results   Component Value Date     08/22/2019           Lab Results   Component Value Date    AST 78 08/22/2019     Patricia KHAN aware of LFTs    Post Infusion Assessment:  Patient tolerated infusion without incident.  Blood return noted pre and post infusion.  Site patent and intact, free from redness, edema or discomfort.  No evidence of extravasations.  Access discontinued per protocol.     Discharge Plan:   Discharge instructions reviewed with: Patient.  Patient and/or family verbalized understanding of discharge instructions and all questions answered.  AVS to patient via [employer].  Patient will return 8/29 for labs and NP f/u for next appointment.   Patient discharged in stable condition accompanied by: self.  Departure Mode: Ambulatory.    Elly Mcfadden, RN, RN

## 2019-08-23 NOTE — ANESTHESIA CARE TRANSFER NOTE
Patient: Odalys Nathan    Procedure(s):  Endoscopic Retrograde Cholangiopancreatogram with 4 mm Hurricane Balloon Dilation, gallbladder stent and Bile Duct stent placements, Bilarry Sphincterotomy     Diagnosis: Cholelithiasis  Diagnosis Additional Information: No value filed.    Anesthesia Type:   General     Note:  Airway :Face Mask  Patient transferred to:PACU  Comments: VSS.  Report given to RN.Handoff Report: Identifed the Patient, Identified the Reponsible Provider, Reviewed the pertinent medical history, Discussed the surgical course, Reviewed Intra-OP anesthesia mangement and issues during anesthesia, Set expectations for post-procedure period and Allowed opportunity for questions and acknowledgement of understanding      Vitals: (Last set prior to Anesthesia Care Transfer)    CRNA VITALS  8/23/2019 1529 - 8/23/2019 1603      8/23/2019             Resp Rate  Sat  BP  HR  12  100  129/71  84                Electronically Signed By: HAILE Estrada CRNA  August 23, 2019  4:03 PM

## 2019-08-23 NOTE — ANESTHESIA PREPROCEDURE EVALUATION
Anesthesia Pre-Procedure Evaluation    Patient: Odalys Nathan   MRN:     [id number] Gender:   female   Age:    59 year old :      1960        Preoperative Diagnosis: Cholelithiasis   Procedure(s):  Endoscopic Retrograde Cholangiopancreatogram     Past Medical History:   Diagnosis Date     Antiplatelet or antithrombotic long-term use      Ascites      Blood clot in the legs      Diabetes (H)      Ovarian cancer (H)     serous,stg IV     Pleural effusion      Pulmonary embolism (H) 2012     Refusal of blood transfusions as patient is [employer]      Short gut syndrome      Subclinical hypothyroidism 2013     Thrombosis of leg       Past Surgical History:   Procedure Laterality Date     COLECTOMY       COLONOSCOPY  2012    Procedure:COLONOSCOPY; With Biopsy; Surgeon:TONI SULLIVAN; Location:[facility]     HYSTERECTOMY TOTAL ABD, RHIANNA SALPINGO-OOPHORECTOMY, NODE DISSECTION, TUMOR DEBULKING, COMBINED  2012    Procedure:COMBINED HYSTERECTOMY TOTAL ABDOMINAL, BILATERAL SALPINGO-OOPHORECTOMY, NODE DISSECTION, TUMOR DEBULKING;  Exploratory Laparotomy, Total Abdominal Hysterectomy, Bilateral Salpingo-Oophorectomy, appendectomy,lysis of adhesions, ileal, ascending, transverse and splenic flexure resection, ileal descending bowel renanastomosis, incidental cystotomy repair, CUSA procedure and colonoscopy ; [facility]     INSERT PORT PERITONEAL ACCESS  4/3/2012    Procedure:INSERT PORT PERITONEAL ACCESS; Intraperitoneal Port Placement (c-arm); Surgeon:SAMUEL CARRASCO; Location:[facility]     INSERT PORT PERITONEAL ACCESS  2014    Procedure: INSERT PORT PERITONEAL ACCESS;  Surgeon: Nga Yeung MD;  Location: [facility] OR     INSERT PORT VASCULAR ACCESS       LAPAROSCOPY DIAGNOSTIC (GYN)  2014    Procedure: LAPAROSCOPY DIAGNOSTIC (GYN);  Surgeon: Nga Yeung MD;  Location: [facility]     LAPAROTOMY EXPLORATORY Right 2015    Procedure: LAPAROTOMY EXPLORATORY;  Surgeon: Nga Yeung  MD Airam;  Location: [facility]     LAPAROTOMY, TUMOR DEBULKING, COMBINED  5/14/2014    Procedure: COMBINED LAPAROTOMY, TUMOR DEBULKING;  Surgeon: Nga Yeung MD;  Location: [facility]     REMOVE CATHETER PERITONEAL N/A 8/20/2014    Procedure: REMOVE CATHETER PERITONEAL;  Surgeon: Nga Yeung MD;  Location: [facility] OR     VASCULAR SURGERY      stent left iliac vein          Anesthesia Evaluation     . Pt has had prior anesthetic. Type: General and MAC    No history of anesthetic complications          ROS/MED HX    ENT/Pulmonary: Comment: 7/11/19 CT Chest: There is continued increased adenopathy left supraclavicular  region measuring 2.3 x 2.1 cm, previously 2.2 x 1.7 cm. Right chest  port remains in good position with catheter tip in the distal SVC.  Calcified granuloma is noted in the lateral right middle lobe near the  major fissure on series 6, image 141. The lungs are otherwise clear.  No infiltrate, consolidation or mass. Esophagus is unremarkable.  Thyroid gland appears normal where imaged. Periesophageal adenopathy  in the mediastinum has slightly progressed in the interval since prior  exam. Right hilar lymph node is also slightly larger now measuring 1.4  x 1.0 cm on image 25 of series 2, previously 1.1 x 0.7 cm. A new  paraesophageal node on the same image now measures 2.0 x 1.2 cm. No  enlarged axillary lymph nodes. Right retrocrural lymph node on series  2, image 48 now appears partially calcified possibly related to  changes from interval therapy and measures 1.6 x 1.3 cm, increased  since the previously measured 1.2 x 0.9 cm.      Neurologic:  - neg neurologic ROS     Cardiovascular:     (+) ----. : . . . :. . Previous cardiac testing Echodate:9/14/17 MUGAresults:1. Left ventricular ejection fraction is 54%. On the prior study from  7/11/2013 this was 66.4%.  2. Normal appearing left ventricular wall motion.date: results: date: results: date: results:          METS/Exercise Tolerance:      Hematologic:     (+) History of blood clots (2012) pt is not anticoagulated, Anemia, Other Hematologic Disorder-chemo induced neutropenia      Musculoskeletal:         GI/Hepatic: Comment: Short gut synd    (+) cholecystitis/cholelithiasis,       Renal/Genitourinary:         Endo:     (+) thyroid problem (subclinical ) hypothyroidism, .      Psychiatric:  - neg psychiatric ROS       Infectious Disease:         Malignancy:   (+) Malignancy History of Other  Other CA ovarian CA w/ mets-->liver Active status post Surgery and Chemo         Other: Comment: Malnutrition   Jahova's witness   (+) H/O Chronic Pain,H/O chronic opiod use ,                        PHYSICAL EXAM:   Mental Status/Neuro: A/A/O   Airway: Facies: Feasible  Mallampati: I  Mouth/Opening: Full  TM distance: > 6 cm  Neck ROM: Full   Respiratory: Auscultation: CTAB     Resp. Rate: Normal     Resp. Effort: Normal      CV: Rhythm: Regular  Rate: Age appropriate  Heart: Normal Sounds  Edema: None   Comments:      Dental: Normal Dentition                LABS:  CBC:   Lab Results   Component Value Date    WBC 6.5 08/15/2019    WBC 10.5 08/08/2019    HGB 11.7 08/15/2019    HGB 13.2 08/08/2019    HCT 35.5 08/15/2019    HCT 39.7 08/08/2019     08/15/2019     08/08/2019     BMP:   Lab Results   Component Value Date     08/22/2019     08/15/2019    POTASSIUM 3.4 08/22/2019    POTASSIUM 3.3 (L) 08/15/2019    CHLORIDE 103 08/22/2019    CHLORIDE 98 08/15/2019    CO2 25 08/22/2019    CO2 29 08/15/2019    BUN 8 08/22/2019    BUN 5 (L) 08/15/2019    CR 0.52 08/22/2019    CR 0.50 (L) 08/15/2019     (H) 08/22/2019     (H) 08/15/2019     COAGS:   Lab Results   Component Value Date    PTT 22 05/18/2017    INR 0.92 05/18/2017     POC:   Lab Results   Component Value Date     (H) 06/03/2016    HCG Negative 08/20/2014     OTHER:   Lab Results   Component Value Date    JOSE ALBERTO 8.8 08/22/2019    PHOS 3.0 06/09/2016    MAG 1.2 (L)  "08/22/2019    ALBUMIN 2.8 (L) 08/22/2019    PROTTOTAL 7.3 08/22/2019     (H) 08/22/2019    AST 78 (H) 08/22/2019    GGT 21 08/10/2017    ALKPHOS 1,065 (H) 08/22/2019    BILITOTAL 1.0 08/22/2019    LIPASE 134 01/03/2013    AMYLASE 30 08/10/2017    TSH 5.83 (H) 01/03/2013    T4 0.84 01/03/2013    CRP <5.0 01/03/2013    SED 11 01/03/2013        Preop Vitals    BP Readings from Last 3 Encounters:   08/22/19 105/73   08/15/19 124/73   08/12/19 112/74    Pulse Readings from Last 3 Encounters:   08/22/19 91   08/15/19 78   08/12/19 90      Resp Readings from Last 3 Encounters:   08/22/19 16   08/15/19 16   08/12/19 16    SpO2 Readings from Last 3 Encounters:   08/22/19 98%   08/15/19 99%   08/12/19 98%      Temp Readings from Last 1 Encounters:   08/22/19 37.1  C (98.7  F) (Tympanic)    Ht Readings from Last 1 Encounters:   08/08/19 1.651 m (5' 5\")      Wt Readings from Last 1 Encounters:   08/15/19 60.6 kg (133 lb 8 oz)    Estimated body mass index is 22.22 kg/m  as calculated from the following:    Height as of 8/8/19: 1.651 m (5' 5\").    Weight as of 8/15/19: 60.6 kg (133 lb 8 oz).     LDA:  Port A Cath Single 08/20/14 Right Chest wall (Active)   Access Date 08/22/19 8/22/2019  9:00 AM   Access Attempts 1 8/22/2019  9:00 AM   Gauge/Length Power noncoring 90 degree bend;20 gauge;3/4 inch 8/22/2019  9:00 AM   Site Assessment WDL 8/22/2019 10:00 AM   Line Status Blood return noted;Infusing;Heparin locked 8/22/2019 10:00 AM   Extravasation? No 8/22/2019 10:00 AM   Dressing Intervention New dressing 8/22/2019 10:00 AM   De-Access Date 08/22/19 8/22/2019 10:00 AM   Number of days: 1829        Assessment:   ASA SCORE: 3    H&P: History and physical reviewed and following examination; no interval change.   Smoking Status:  Non-Smoker/Unknown   NPO Status: NPO Appropriate     Plan:   Anes. Type:  General   Pre-Medication: None   Induction:  IV (Standard)   Airway: ETT; Oral   Access/Monitoring: PIV   Maintenance: TIVA "     Postop Plan:   Postop Pain: Opioids  Postop Sedation/Airway: Not planned     PONV Management:   Adult Risk Factors: Female, Non-Smoker, Postop Opioids   Prevention: Ondansetron, Dexamethasone, Scopolamine, No Volatiles     CONSENT: Direct conversation   Plan and risks discussed with: Patient; Spouse   Blood Products: Refusal (SOME/ALL Blood Products)  Reason for Refusal: Bahai  Willing to accept: Factor Concentrate; Albumin       Comments for Plan/Consent:  11/25/15; 1005; Mask Ventilation: Easy; Ease of Intubation: Easy; Airway Size: 7;  Cuffed;  Oral;  Blade Type: Michelle;  Blade Size: 2;  Place by: ;  Insertion Attempts: 1;  Secured at (cm)to lip: 21 cm;  Breath Sounds: Equal, clear and bilateral;  End Tidal CO2: Present;  Dentition: Intact;  Grade View of Cords: 2 (clear and open)                 Desire Galarza MD

## 2019-08-23 NOTE — ANESTHESIA POSTPROCEDURE EVALUATION
Anesthesia POST Procedure Evaluation    Patient: Odalys Nathan   MRN:     [id number] Gender:   female   Age:    59 year old :      1960        Preoperative Diagnosis: Cholelithiasis   Procedure(s):  Endoscopic Retrograde Cholangiopancreatogram with 4 mm Hurricane Balloon Dilation, gallbladder stent and Bile Duct stent placements, Bilarry Sphincterotomy    Postop Comments: No value filed.       Anesthesia Type:  Not documented  General    Reportable Event: YES     PAIN: Uncomplicated   Sign Out status: Comfortable, Well controlled pain     PONV: PONV Occurence     Symptoms:  Nausea only (mild)   Sign Out status:  No Nausea or Vomiting     Neuro/Psych: Uneventful perioperative course   Sign Out Status: Preoperative baseline; Age appropriate mentation     Airway/Resp.: Uneventful perioperative course   Sign Out Status: Non labored breathing, age appropriate RR; Resp. Status within EXPECTED Parameters     CV: Uneventful perioperative course   Sign Out status: Appropriate BP and perfusion indices; Appropriate HR/Rhythm     Disposition:   Sign Out in:  PACU  Disposition:  Home  Recovery Course: Uneventful  Follow-Up: Not required           Last Anesthesia Record Vitals:  CRNA VITALS  2019 1529 - 2019 1629      2019             Resp Rate (observed):  2  (Abnormal)           Last PACU Vitals:  Vitals Value Taken Time   /79 2019  5:10 PM   Temp     Pulse 89 2019  5:10 PM   Resp 18 2019  4:45 PM   SpO2 100 % 2019  5:14 PM   Temp src     NIBP     Pulse     SpO2     Resp     Temp     Ht Rate     Temp 2     Vitals shown include unvalidated device data.      Electronically Signed By: Bob Gentile MD, 2019, 5:15 PM

## 2019-08-23 NOTE — BRIEF OP NOTE
[facility] Brief Operative Note    Pre-operative diagnosis: Abnormal liver enzymes, Cholecystitis   Post-operative diagnosis Biliary stricture  Chronic cholecystitis   Procedure: Procedure(s):  Endoscopic Retrograde Cholangiopancreatogram with 4 mm Hurricane Balloon Dilation, gallbladder stent and Bile Duct stent placements, Bilarry Sphincterotomy    Surgeon: Catrachito Lloyd MD   Assistants(s): Jaguar Musa MD   Estimated blood loss: None    Specimens: None   Findings: -Biliary sphincterotomy.  -Dilated the cystic duct and left hepatic duct.  -Placed a stent in the gallbladder and left hepatic duct.     Recommendations:   -Monitor patient in PACU as per protocol with likely discharge home today       -Ice chips for now and then advance diet as tolerated       -Hold anticoagulation for 72 hours       -Monitor LFTs       -Repeat procedure in 4 weeks    Implants:    Implant Name Type Inv. Item Serial No.  Lot No. LRB No. Used   STENT BILIARY COMPASS BDS [id number] DBL PIGTAIL [id number] Stent STENT BILIARY COMPASS BDS [id number] PIGTAIL [id number]  [employer] [id number] N/A 1   STENT ZIMMON PANCREA [id number] SGL PIGTAIL Stent STENT ZIMMON PANCREA [id number] SGL PIGTAIL  [employer] [id number] N/A 1                              Jaguar Musa MD  Interventional Endoscopy Fellow

## 2019-08-23 NOTE — OR NURSING
1601 patient arrived to pacu via cart with CRNA and OR Rn at side ,nurse  hand off report completed . VSS , no c/o Pain or Nausea  Assessment completed  Refer to chart .MF  1618 patient c/o [place] patient medicated , 1640 Dr Musa at bedside and spoke with patient.1800 patient left for phase 2 via cart with NST , nurse hand off report given to Rochelle Green.

## 2019-08-23 NOTE — DISCHARGE INSTRUCTIONS
[facility]  Same-Day Surgery   Adult Discharge Orders & Instructions     For 24 hours after surgery    1. Get plenty of rest.  A responsible adult must stay with you for at least 24 hours after you leave the hospital.   2. Do not drive or use heavy equipment.  If you have weakness or tingling, don't drive or use heavy equipment until this feeling goes away.  3. Do not drink alcohol.  4. Avoid strenuous or risky activities.  Ask for help when climbing stairs.   5. You may feel lightheaded.  IF so, sit for a few minutes before standing.  Have someone help you get up.   6. If you have nausea (feel sick to your stomach): Drink only clear liquids such as apple juice, ginger ale, broth or 7-Up.  Rest may also help.  Be sure to drink enough fluids.  Move to a regular diet as you feel able.  7. You may have a slight fever. Call the doctor if your fever is over 100 F (37.7 C) (taken under the tongue) or lasts longer than 24 hours.  8. You may have a dry mouth, a sore throat, muscle aches or trouble sleeping.  These should go away after 24 hours.  9. Do not make important or legal decisions.   Call your doctor for any of the followin.  Signs of infection (fever, growing tenderness at the surgery site, a large amount of drainage or bleeding, severe pain, foul-smelling drainage, redness, swelling).    2. It has been over 8 to 10 hours since surgery and you are still not able to urinate (pass water).    3.  Headache for over 24 hours.      To contact a doctor, call Dr. Lloyd's clinic at #[phone] or:        [phone] and ask for the resident on call for surgery/gastroenterology (answered 24 hours a day)      Emergency Department:    [facility]: [phone]       (TTY for hearing impaired: [phone])

## 2019-08-27 NOTE — TELEPHONE ENCOUNTER
Spoke to patient in regards to scheduled procedure. Informed patient she is scheduled with Dr. Lloyd on 10/4/19.  Informed patient she will need a  and someone to monitor her for 24 hours after the procedure. Informed patient all scheduling details will be sent to her mychart per her request.     8/27/19 1142an

## 2019-08-29 NOTE — PROGRESS NOTES
Gynecologic Oncology Follow-Up Note    RE: Odalys Nathan  MRN: [id number]  : 1960  Date of Visit: 2019    CC: Odalys Nathan is a 59 year old year old female with recurrent ovarian cancer who presents today for follow up regarding disease management.    HPI: Odalys comes to the clinic accompanied by her  Marcos. She is feeling fair today. She had an ERCP and stent placement on , was found to have chronic cholecystitis and biliary stricture. She found immediate relief of her RUQ pain, though since then her pain has been gradually increasing. She takes either tramadol or oxycodone as needed but never together. States both are equally effective and denies significant sedation or side effects with either medication. Taking pain medication roughly once every other day which is an improvement for her. She does continue to have a low appetite and early satiety- feels nauseated when she eats more than a small amount at a time. Trying to take in one Glucerna shake daily. No emesis. She is open to trying an appetite stimulant. Alternating between constipation and diarrhea, though diarrhea has been her baseline for years. Continues with fatigue, able to complete ADLs and rest. No fevers, chills, bleeding. Denies urinary concerns, SOB, chest pain. Felt she tolerated gemcitabine itself well, though she did have nausea in the evening on day one. She feels she is drinking less fluid than normal and would like IV fluids with her scheduled chemotherapy. She plans to see palliative medicine as scheduled next week. Will have repeat ERCP in October. Feels ready for more treatment today.        Oncology History:  2012 - Admitted to hospital for 2 weeks of intermittent abdominal cramping, distention, diarrhea and N/V. CT of abdomen/pelvis significant for small bowel obstruction, a heterogenous soft tissue density in the pelvis, omental nodules, and ascites. Bilateral adnexal masses per U/S of  pelvis. CA-125 was elevated at 987, CEA was normal at 1.0.    1/18/12 - Therapeutic paracentesis (4 L) with cytology confirming malignancy (AZ positive, weak ER positive, CK-7 positive consistent with GYN primary). Surgery recommended d/t potential for falling blood counts 2/2 chemotherapy (patient is Jainism and limiting blood transfusion)    2/1/12 - Exploratory laparotomy, MAR BSO, lysis of adhesion, Appendectomy, Repair cystotomy, Omentectomy Jypj-ndnulj-qcpymjspf-transverse-colon resection, Ileo-descending colon anastomosis, CUSA, & Colonoscopy (done by Dr. Amato and colorectal team).  2/20/2012 - Admitted to hospital for bilateral pulmonary emboli and drainage of pleural effusion. Started on Lovenox.  2/28/12-3/21/12: Cycle #1-2 Carbo/Taxol IV  3/29/12 - Started on Keflex by her PCP for infection in her healing wound (immediately below her umbilicus).    4/11/12-6/14/12: Cycle #3-6 IV chemo, Cycle #1 IV/IP chemo  7/16/12 -  8, CT PRADEEP - enrolled in  - observation arm  3/4/13-6/28/13:  6, 9, 12, 15, 20.  7/1/13: CT Chest, Abdomen, Pelvis IMPRESSION:  1. Worsening metastatic ovarian carcinoma suggested by increased size of soft tissue nodules anterior to the right psoas muscle, that may represent growing mesenteric lymphadenopathy.  2. Remaining prominent right lower quadrant mesenteric lymph nodes are not significantly changed from CT 7/16/2012.  3. Clustered nodular opacities in the right lower lobe are not significant change from 7/16/2012 and remain indeterminate. Again, the appearance and distribution is suggestive of an infectious etiology.  4. Stable 8 mm soft tissue nodule in left breast, unchanged since at least 02/20/2012. This can be observed on followup studies, but correlation with mammography could be considered.  Decision made to start Doxil/Carbo.  7/11/13: The left ventricular ejection fraction is normal at 66.4%.  7/12/13-9/6/13: Cycle #1-3 Doxil/Carbo.  10,  11.    9/30/13 CT C/A/P Impression:  1. Overall, favorable response to treatment with decreasing size of soft tissue nodules tracking along the anterior aspect of the right psoas muscle.  2. 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  3. Improved cluster of right lower lobe pulmonary nodular opacities. These may represent resolving infection.  10/3/13-12/9/13:  9, 8, 10. Cycle 4-6 Doxil/Carbo.    1/13/14 CT C/A/P Impression:   1. Stable appearance of metastatic ovarian cancer. Scattered soft tissue nodules along the anterior aspect of the right psoas muscle are unchanged in size. Mild mesenteric lymphadenopathy is unchanged.  2. Clustered micronodules in the right lower lobe are unchanged from 9/30/2013, but improved from 7/1/2013. This history suggests a postinflammatory/postinfectious etiology.  3. 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  1/16/14 Discussed multiple options for her based on relatively stable-appearing disease on CT but slight increase in  (which has had small increase to 20 with last recurrence) including chemo break with recheck of  in 1 month, starting new chemo agent immediately, and exploratory surgery with possible resection of nodules. She is considered platinum-sensitive based on > 1 year remission after Taxol/Carbo, which we will take into consideration for future chemo planning. I am not inclined to surgery at this time given difficulty she already has with diarrhea secondary to past colon resection. I suspect we would need to resect further bowel due to mesenteric disease. Also explained inherent risks of any major surgery. Also mentioned maintenance chemo, but this has not been shown to increase overall survival and would likely decrease her quality of life without significant benefit. Family is going on vacation to [place] in 2 weeks and Odalys does not want to have chemo prior to that, so will plan to take 1 month break. She can have  at that time  "(discussed checking toady since last draw was in early December, but as it would likely not change treatment plan and she has h/o slow rising , will not check today).  2/17/14  16    2/20/14: Decision to take break from chemo for two months, followed by CT and CA-125.    4/21/14  27  4/21/14 CT C/A/P Impression:    1. Increased size of 2 low-attenuation lymph nodes anterior to the right psoas muscle is concerning for worsening metastatic ovarian cancer.    2. New circumferential thickening of a 3.8 cm length segment of distal transverse colon is likely physiologic. Recommend attention on followup imaging.    3. Grossly unchanged size of clustered small nodules versus scarring in the right lower lobe the lungs.    4. Stable thrombosis of the right ovarian vein.  5/14/14: Diagnostic laparoscopy converted to exploratory laparotomy and removal of mesenteric masses, tumor debulking, peritoneal biopsies and intraperitoneal port placement. On laparoscopy, it was noted that there were small nodules on the anterior abdominal wall near the previous incision, small nodules on the right pelvic sidewall as well. Nodules were palpated in the mesentery; however, as it was unable to clarify where the origin of the nodules was, the decision was made to open the patient. On opening there was found to be approximately a 3 cm nodule in the small bowel mesentery and another separate approximately 2 cm nodule in the bowel mesentery. Pelvis without evidence of cancer, some mesenteric lymph nodes were palpated. No evidence otherwise of any disseminated cancer throughout the abdomen.    FINAL DIAGNOSIS:  A: Peritoneum, right paracolic gutter, biopsy:  -Necrotic tissue  -No viable tumor present  B: Soft tissue, anterior abdominal wall nodule, biopsy:  -Fibroadipose tissue with abundant macrophages, fibrosis and calcifications  -Negative for malignancy   C: Lymph nodes, mesentery, \"nodule\", excision:  -Metastatic/recurrent " "high grade serous carcinoma in two of two lymph nodes (2/2)  -Largest metastasis: 1.3 cm  -See comment  D: Peritoneum, right paracolic gutter #2, biopsy:  -Fibroadipose tissue with granulomatous inflammation surrounding refractile material  -Negative for malignancy   E: Small bowel adhesion, biopsy:  -Fibroconnective tissue, consistent with adhesion  -Negative for malignancy  F: Lymph nodes, mesentry, not otherwise specified, excision:  -Two lymph nodes, negative for metastatic carcinoma (0/2)  G: Lymph node, mesentery, \"#2\", excision:  -One lymph node, negative for metastatic carcinoma (0/1)  H: Lymph nodes, mesentery, \"nodule #2\", excision:  -Five lymph nodes, negative for metastatic carcinoma (0/5)  COMMENT:  Some of the specimens show post-operative changes. Others show possible treatment related changes, including necrosis. The metastatic carcinoma in the mesenteric lymph nodes (specimen C) shows variable morphology, including relatively low grade tumor with papillary architecture, and high grade tumor comprised of nests of tumor cells with irregular, slit-like spaces and marked nuclear pleomorphism.    5/29/14: Cycle 1 IV PACLitaxel / IP CISplatin / IP PACLitaxel.  - 28.  6/26/14: Cycle #2 IV/IP.  10  8/5/14: CT chest/abd/pelvis IMPRESSION     1. In this patient with ovarian cancer, overall findings are indicative of stable/slight improvement, as multiple mesenteric lymphadenopathy and scattered nodular peritoneal soft tissue mass lesions appear unchanged or slightly smaller since 4/21/2014.    2. 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 Mild dilatation of the second and the third portion of the duodenum with a narrow SMA angle. This could represent SMA syndrome, if clinically correlated.17/14:    Cycle #3 IV/IP.  10.    CT chest/abd/pelvis with contrast on 8/5/14    Impression:    1. In this patient with ovarian cancer, overall findings are indicative of stable/slight " improvement, as multiple mesenteric lymphadenopathy and scattered nodular peritoneal soft tissue mass lesions appear unchanged or slightly smaller since 4/21/2014.    2. 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 Mild dilatation of the second and the third portion of the duodenum with a narrow SMA angle. This could represent SMA syndrome, if clinically correlated.  8/7/14: Cycle #4 Taxol/Carbo (changed from IV/IP).  10. She has been feeling okay. She is unsure if she can finish out the course of 6 cycles IV/IP taxol/cisplatin. She feels like she has the flu for about a week then starts feeling gradually better after each chemo cycle. Her spouse notes that she actually has been more sick with the treatments than she initially admits here. She was also previously having some rib pain. Denies any rib pain now. Denies any chest pain or shortness of breath.  Plan: discussed recent CT cap results and switching to just IV as she is feeling miserable with IP treatments. Switch to IV carbo/taxol as patient is platinum sensitive.  We also discussed her taking part of the tesaro trial, which would require BRCA testing. She would like to take part in this trial if eligible.  8/20/14: Remove Intraperitoneal Port ( Port and catheter intact - discarded)  8/28/14: Cycle #5 Taxol/Carbo held due to thrombocytopenia.  6.    She denies any vaginal bleeding, no changes in her bowel or bladder habits, no nausea/emesis, no lower extremity edema, and no difficulties eating or sleeping. She denies any abdominal discomfort/bloating, no fevers or chills, and no chest pain or shortness of breath. She states her diarrhea is the same. She reports some fatigue which improves about 1-2 weeks after her chemotherapy. She states she does not need any medication refills and she was told she does not meet the criteria for the TESARO trial. She states she has 3 bags of iv fluids left over from her previous chemotherapy and  will give these to herself. She states she is ready for her treatment today.    9/29/14: Cycle #6 Taxol/Carbo  6. Insurance questions regarding GSF coverage today. No concerns other than fatigue. Taking iron for anemia and does not desire blood transfusion. Using neulasta for neutropenia. Using home IV hydration if needed. Baseline unchanged. No abdominal bloating, constipation, diarrhea, pain, vaginal or rectal bleeding, cough or dyspnea, fluid retention.    10/16/14: Impression:    1. Nodular peritoneal soft tissue mass in the right lower quadrant adjacent to the psoas muscle is no longer appreciated. Adjacent prominent lymphadenopathy is unchanged from previous exam. No new peritoneal lesions.    2. 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     10/20/14:  5. CT chest/abdomen/pelvis on 10/16/14 showed nodular peritoneal soft tissue mass in the right lower quadrant adjacent to the psoas muscle is no longer appreciated. Adjacent prominent lymphadenopathy is unchanged from previous exam. 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  1/27/15:  6.  4/28/15:  14.  5/26/15:  18.  6/2/15: CT cap Impression:  1. Postsurgical changes of hysterectomy and bilateral salpingo-oophorectomy for ovarian cancer. There is a new 8 mm hazy, ill-defined hypoattenuating lesion in hepatic segment 6 which is suspicious for a metastatic deposit. Further evaluation with ultrasound in recommended.    2. Increased size of a left retroperitoneal lymph node which is indeterminate but may represent a ciro metastasis. Mildly prominent lymph nodes in the right lower quadrant are not significantly changed.  3. Moderate colonic stool burden.    6/4/15: US abdomen IMPRESSION:    Hyperechoic lesion in the right hepatic lobe, consistent with hemangioma. This does not corresponds to the area of the lesion seen on CT from 6/2/2015. An MR would be helpful for identifying  and characterizing the lesion from the recent CT.  6/12/15: MR abd IMPRESSION:  1. New 20 x 11 mm enhancing lesions between the right obliques, concerning for metastatic disease. This lesions should be amenable to percutaneous biopsy, if indicated.  2. Correlating to the lesion visualized on comparison CT is a hepatic segment 6 subcapsular 7 mm lesion. Overall the appearance favors the diagnosis of a simple cyst. However, there is faint suggestion of mild peripheral arterial enhancement. Although this is favored as  artifactual, this should be followed up to confirm stability. Recommend 6 month followup.  3. Hepatic segment 6, 5 mm lesion too small to technically characterize. Differential would favor FNH, less likely flash filling hemangioma. Recommend attention on followup.  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      9/1/15: Cycle #1 Avastin/Cytoxan.   31.      9/24/15:feeling generally well. She says she has been having back and stomach spasms. She is taking cytosine daily (she ran out yesterday). She also says its affecting her voice. Admits that it burns occasionally. She is eating and drinking normal. She also admit diarrhea, 5-7 times daily, lose/watery. She trying to stay hydrated and eat fiber. She also says that her body is sore, especially the bottom of her feet. Her blood pressure is normal. She also admits having headache after her first infusion.       9/24/15: Cycle #2 Avastin/Cytoxan.  19.  10/15/15: Cycle #3 Avastin/Cytoxan.  16.      11/6/15: CT c/a/p IMPRESSION:    1. Stable postoperative change of MAR/BSO for ovarian cancer.  2. The lesion in the right flank abdominal musculature is slightly decreased in size. Otherwise, stable examination.  2. No evidence of metastatic disease in the chest.     11/20/15: Treatment planning visit,   16     11/25/15: surgical pathology report  FINAL DIAGNOSIS:  Soft tissue, right oblique muscle mass, excision:  -Recurrent ovarian serous carcinoma  -Carcinoma is present less than 1 mm from one resection margin  -Background skeletal muscle and fibroadipose tissue     1/4/16:  22  1/11/16-1/27/16: Radiation to right flank x 12 treatments  4/7/2016:  94. CT cap IMPRESSION:    In this patient with ovarian cancer status post MAR/BSO and descending/transverse colectomy:  1. No evidence for malignancy in the chest, abdomen, or pelvis.  2. Stable small hypodense segment 6 liver lesion, appears more likely benign, possibly a cyst.  5/13/16:  124.  6/3/16: PET CT IMPRESSION: In this patient with a history of ovarian cancer:  1. Hypermetabolic and enlarging periaortic and perihepatic lymphadenopathy compatible with metastatic disease, as detailed above.  2. Although hypodense lesion in hepatic segment 6 has been present since 6/2/2015 associated hypermetabolism makes this lesion highly concerning for metastatic disease.     Plan: to start Niraparib under TESARO study.      6/9/16:  137.  6/14/16: Cycle #1 Niraparib.    6/28/16: Cycle #1 D15 Niraparib.    7/5/16:  100.    7/11/16: Cycle #2 Nraparib.   83.     8/3/2016: PET CT IMPRESSION:    In this patient with known history of ovarian cancer:  1) New pleural based nodular opacities in the lateral and inferior aspects of the bilateral lower lobes, worse in the left lung. Likely infection. Close follow up is recommended.      2) Slight decrease in hypermetabolic abdominal lymphadenopathy. 2 hypermetabolic lymph nodes persist.  3) Unchanged right hepatic lobe metastatic lesion.      8/9/16: Cycle #3 Niraparib.  69.  9/6/16: Cycle #4 Niraparib.  53. Dose held due to anemia.  9/13/16: Eval for potential cycle 4 niraparib. Dose held due to anemia.  10/4/16: CT CAP impression:  IMPRESSION: In this patient with a known history of  ovarian cancer:  1. There has been interval resolution of pleural-based nodular  opacities which likely represented infection.  2. Abdominal lymphadenopathy in the form of 2 portacaval lymph nodes  have not significantly changed in size, noted to be hypermetabolic on  prior PET/CT.  3. Previously demonstrated metastatic lesion in the right lobe of the  liver is not significantly changed.  10/11/16:  60  11/1/16: C6 niraparib,  75  11/29/16: C7 niraparib.  78. CT CAP impression as follows:  Target lesions (RECIST criteria):       A previously described target lesion superior to the head of the  pancreas (series 2, image 64)  (referred to as a perihepatic node on  6/3/2016) may not be a valid target lesion because it measured less  than 1.5 cm originally. However, this particular node has decreased in  size, now measuring 7 mm in short axis versus 14 mm on 6/3/2016 when  measured in a similar fashion.       2.3 cm short axis portacaval lymph node on series 2 image 67,  previously 2.0 cm on 10/4/2016.       1.2 cm subtle hypodensity in hepatic segment 6 on series 2 image 75,  stable on multiple studies since at least 6/3/2016     Sum of diameters today: 3.5 cm. Sum of diameters 10/4/2016: 3.2 cm.  Growth = 9%.    12/27/16: C8 niraparib.  105.  1/25/17: C9 niraparib.  108.  2/23/17: C10 niraparib.  132.   CT CAP impression:  Sum of target lesion diameters today: 3.7 cm. Sum of target lesion  diameters on 11/28/2016: 3.5 cm. Growth= 6%  1. In this patient with history of ovarian cancer there is stable  disease by RECIST criteria as evidenced by:   1a. Mildly increased size of liver metastasis.  1b. Stable portacaval lymphadenopathy.  1c. No evidence of metastatic disease in the chest.  2. Trace emphysematous changes of the lungs.  3/22/17: C11 niraparib.  132.  4/19/17: C12 niraparib.  127.  5/16/17: CT CAP IMPRESSION: In this patient with ovarian cancer:  1. Mildly  increased size of hepatic metastasis segment 6 with subtle increased capsular retraction.  2. Stable edmundo hepatis nodes, with mild increase in size of periaortic lymph nodes.  3. Prominent left supraclavicular lymph node with subtle increase in size compared to prior studies, particularly comparing to 10/4/2016.  4. Mild subtle groundglass opacities in the right upper lobe, not present on prior study, lesser extent in the right lower lobe.  Findings may represent infection, additional consideration is malignancy (less likely), and attention on follow-up study  Recommended.  Addendum:   Prominent left supraclavicular lymph node (3/25) is stable from most recent CT performed 2/20/2017, currently measuring 14 x 16 mm, previously 14 x 16 mm on 2/20/2017.      The portal caval lymph node/edmundo hepatis lymph node is stable from 2/20/2017, measuring 21 mm.      Clarification of size of the para-aortic lymph node (series 3 image 327). It short axis measurement is 11 mm versus 9 mm on prior study.      Hepatic segment 6 triangular-shaped low density lesion (3/362) measures 14 mm, previously measured 12 mm, demonstrating a  possible/questionable minimal subtle increase in size. Similarly the lymph nodes noted above in the supraclavicular and para-aortic regions demonstrate possible minimal possible subtle increase in size.      5/18/17: Cycle #13 Niraparib.  191.  6/15/17: Cycle #14 Niraparib.  146.  7/13/17: Cycle #15 Niraparib 200 mg.  171     CT (8/9/17):       IMPRESSION:  1. Segment 6 hepatic metastasis is stable to minimally increased in size.  2. Slight increase in multicentric adenopathy, most pronounced at the edmundo hepatis. Additional sites include the inferior left neck/supraclavicular region and retroperitoneum which appear stable to  minimally increased.      8/10/17:  175  9/14/17: MUGA LVEF 54%  9/22/17: C1D1 carboplatin/Doxil.  187.  10/19/17: C2D1 carboplatin/Doxil.   108.  11/17/17: C3D1 carboplatin/Doxil.  82.  12/14/17: C4D1 carboplatin/Doxil/avastin.  83.  Of note, avastin held on C4D14  1/12/18: C5D1 carboplatin/Doxil/avastin.  80.  1/26/18: platelets 61, patient was not given avastin due to being jehova's witness.   2/9/18: C6D1 carboplatin/Doxil/Avastin.   71.  3/2/18:  49. Three month treatment break.  6/20/2018:  170. CT CAP:  IMPRESSION: In this patient with history of ovarian cancer:  1. Increased size of a right hepatic lobe lesion and numerous edmundo hepatis and retroperitoneal lymph nodes compatible with progression of metastatic disease.  2. New mild intrahepatic biliary ductal dilatation, periportal edema, and pericholecystic fluid. Increased soft tissue fullness in the edmundo hepatis in the expected location of the common hepatic duct. Findings  are suspicious for developing biliary ductal obstruction secondary to worsening metastatic disease in the edmundo hepatis. Correlation with liver function tests is recommended. Right upper quadrant ultrasound  may be beneficial.  3. Unchanged left supraclavicular and right hilar lymphadenopathy in the chest.      8/3/18: C1D1 weekly paclitaxel.  not done.  9/20/18: C2D1 weekly paclitaxel 80mg/m2. Ca 125-56  11/8/2018: C3D1 weekly paclitaxel;  26     12/17/18: Ct cap IMPRESSION:  1. New area of lobulated hypodense nodularity at the far-inferior right liver. This raises the possibility of a new area of hepatic metastasis.  2. Conversely, other nodules within the right liver are smaller suggesting improvement.  3. Improved adenopathy at the abdominal retroperitoneum. Improved left  supraclavicular adenopathy. Stable mildly prominent right hilar lymph nodes.  4. Previously noted ill-defined soft tissue at the edmundo hepatis region is still present but appears less prominent in size.  5. New finding of segmental wall thickening of the proximal sigmoid colon with some fluid distention  of the adjacent colon. This could represent a segmental colitis. Other etiologies not excluded.     12/20/18:   19.  1/22/19:  45.  3/20/19: CT cap IMPRESSION:  1. Progression of disease with increasing size of a right inferior hepatic mass consistent with metastatic disease.  2. Increasing ill-defined multifocal regions of soft tissue at the edmundo hepatis consistent with malignant adenopathy versus malignant implants. Associated increased intrahepatic biliary ductal dilatation.  3. Other areas of progressive adenopathy noted at the left neck base, mediastinum, and abdominal retroperitoneum.  4. New area of carcinomatosis medial to stomach at the left upper abdomen.     3/20/19: Cycle #1 weekly paclitaxel.   180.  5/7/19: Cycle #2 weekly paclitaxel.    142.  6/20/19: Cycle #3 weekly paclitaxel/  259.     7/11/19: CT CAP-IMPRESSION:  1. Slight increased size of the left supraclavicular lymph node.  Slight progression of the mediastinal lymph nodes is also evident. New  periesophageal node as described above. Retroperitoneal nodes are  stable if not slightly smaller. Some of these nodes appear to have  partially calcified suggesting a response to interval therapy.  2. Interval decreased size of the inferior right hepatic lobe lesion  now with an area of central calcification or enhancement. No new liver  lesions. Remaining abdominal organs are grossly unremarkable. No  significant hydronephrosis.  3. Postop changes involving the bowel and pelvic region. No recurrent  pelvic mass.     8/8/19: C1 gemcitabine 800mg/m2 IV days 1&8.  446.    8/23/19: Endoscopic Retrograde Cholangiopancreatogram with 4 mm Hurricane Balloon Dilation, gallbladder stent and Bile Duct stent placements, biliary Sphincterotomy       8/29/19: C2 gemcitabine 800mg/m2 IV days 1&8.  548.    Past Medical History:   Diagnosis Date     Antiplatelet or antithrombotic long-term use      Ascites      Blood clot in  the legs      Diabetes (H)      Ovarian cancer (H)     serous,stg IV     Pleural effusion      Pulmonary embolism (H) 2/2012     Refusal of blood transfusions as patient is [employer]      Short gut syndrome      Subclinical hypothyroidism 4/18/2013     Thrombosis of leg        Past Surgical History:   Procedure Laterality Date     COLECTOMY       COLONOSCOPY  2/1/2012    Procedure:COLONOSCOPY; With Biopsy; Surgeon:TONI SULLIVAN; Location:[facility]     ENDOSCOPIC RETROGRADE CHOLANGIOPANCREATOGRAM N/A 8/23/2019    Procedure: Endoscopic Retrograde Cholangiopancreatogram with 4 mm Hurricane Balloon Dilation, gallbladder stent and Bile Duct stent placements, Bilarry Sphincterotomy ;  Surgeon: Catrachito Lloyd MD;  Location: [facility]     HYSTERECTOMY TOTAL ABD, RHIANNA SALPINGO-OOPHORECTOMY, NODE DISSECTION, TUMOR DEBULKING, COMBINED  2/1/2012    Procedure:COMBINED HYSTERECTOMY TOTAL ABDOMINAL, BILATERAL SALPINGO-OOPHORECTOMY, NODE DISSECTION, TUMOR DEBULKING;  Exploratory Laparotomy, Total Abdominal Hysterectomy, Bilateral Salpingo-Oophorectomy, appendectomy,lysis of adhesions, ileal, ascending, transverse and splenic flexure resection, ileal descending bowel renanastomosis, incidental cystotomy repair, CUSA procedure and colonoscopy ; [facility]     INSERT PORT PERITONEAL ACCESS  4/3/2012    Procedure:INSERT PORT PERITONEAL ACCESS; Intraperitoneal Port Placement (c-arm); Surgeon:SAMUEL CARRASCO; Location:[facility]     INSERT PORT PERITONEAL ACCESS  5/14/2014    Procedure: INSERT PORT PERITONEAL ACCESS;  Surgeon: Nga Yeung MD;  Location: [facility]     INSERT PORT VASCULAR ACCESS       LAPAROSCOPY DIAGNOSTIC (GYN)  5/14/2014    Procedure: LAPAROSCOPY DIAGNOSTIC (GYN);  Surgeon: Nga Yeung MD;  Location: [facility]     LAPAROTOMY EXPLORATORY Right 11/25/2015    Procedure: LAPAROTOMY EXPLORATORY;  Surgeon: Nga Yeung MD;  Location: [facility]     LAPAROTOMY, TUMOR DEBULKING, COMBINED  5/14/2014    Procedure:  COMBINED LAPAROTOMY, TUMOR DEBULKING;  Surgeon: Nga Yeung MD;  Location: [facility]     REMOVE CATHETER PERITONEAL N/A 8/20/2014    Procedure: REMOVE CATHETER PERITONEAL;  Surgeon: Nga Yeung MD;  Location: [facility]     VASCULAR SURGERY      stent left iliac vein       Current Outpatient Medications   Medication     albuterol (PROAIR HFA/PROVENTIL HFA/VENTOLIN HFA) 108 (90 Base) MCG/ACT inhaler     Ascorbic Acid (VITAMIN C PO)     Calcium Carbonate-Vitamin D (CALCIUM + D PO)     cyanocobalamin (VITAMIN B12) 1000 MCG/ML injection     diphenoxylate-atropine (LOMOTIL) 2.5-0.025 MG per tablet     Ferrous Sulfate 324 (65 Fe) MG TBEC     HEMP OIL OR EXTRACT OR OTHER CBD CANNABINOID, NOT MEDICAL CANNABIS,     HERBALS     iohexol (OMNIPAQUE) 140 MG/ML solution for oral use     levofloxacin (LEVAQUIN) 500 MG tablet     LEVOTHYROXINE SODIUM PO     loperamide (IMODIUM) 2 MG capsule     LORazepam (ATIVAN) 1 MG tablet     magnesium oxide (MAG-OX) 400 MG tablet     omeprazole (PRILOSEC) 20 MG DR capsule     order for DME     oxyCODONE (ROXICODONE) 5 MG tablet     potassium chloride (KLOR-CON) 20 MEQ packet     prochlorperazine (COMPAZINE) 10 MG tablet     VITAMIN E NATURAL PO     No current facility-administered medications for this visit.      Facility-Administered Medications Ordered in Other Visits   Medication     heparin 100 UNIT/ML injection 500 Units       No Known Allergies    Family History   Problem Relation Age of Onset     Cancer Mother 69        lung, smoker     Cancer Maternal Uncle 65        brain     Colon Cancer Maternal Aunt 80        colon       Social History     Socioeconomic History     Marital status:      Spouse name: Not on file     Number of children: Not on file     Years of education: Not on file     Highest education level: Not on file   Occupational History     Not on file   Social Needs     Financial resource strain: Not on file     Food insecurity:     Worry: Not on file      "Inability: Not on file     Transportation needs:     Medical: Not on file     Non-medical: Not on file   Tobacco Use     Smoking status: Former Smoker     Years: 8.00     Types: Cigarettes     Last attempt to quit: 1980     Years since quittin.2     Smokeless tobacco: Never Used     Tobacco comment: started at 11 yo and quit at 18 yo   Substance and Sexual Activity     Alcohol use: Yes     Comment: 3x/day wine or jodi     Drug use: No     Sexual activity: Not on file   Lifestyle     Physical activity:     Days per week: Not on file     Minutes per session: Not on file     Stress: Not on file   Relationships     Social connections:     Talks on phone: Not on file     Gets together: Not on file     Attends Yazdanism service: Not on file     Active member of club or organization: Not on file     Attends meetings of clubs or organizations: Not on file     Relationship status: Not on file     Intimate partner violence:     Fear of current or ex partner: Not on file     Emotionally abused: Not on file     Physically abused: Not on file     Forced sexual activity: Not on file   Other Topics Concern     Parent/sibling w/ CABG, MI or angioplasty before 65F 55M? Not Asked   Social History Narrative     Not on file           ROS  12 point ROS reviewed and negative except as listed in HPI      Physical Exam:    /64   Pulse 100   Temp 96.9  F (36.1  C) (Tympanic)   Resp 16   Ht 1.651 m (5' 5\")   Wt 57.3 kg (126 lb 4 oz)   SpO2 99%   BMI 21.01 kg/m      CONSTITUTIONAL: Alert non-toxic appearing female in no acute distress  HEAD: Normocephalic, atraumatic  EYES: PERRLA; no scleral icterus  ENT: Oropharynx pink without lesions, thick white coating to tongue  NECK: Neck supple, firm fixed left supraclavicular node roughly 4cm x 4cm, non-tender without erythema or edema, not eroding through skin  RESPIRATORY: Lungs clear to auscultation, no increased work of breathing noted  CV: Regular rate and rhythm, S1S2, " no clicks, murmurs, rubs, or gallops; bilateral lower extremities without edema, dorsalis pedis pulses 2+ bilaterally  GASTROINTESTINAL: Normoactive bowel sounds x4 quadrants, abdomen soft, slightly distended, and non-tender to palpation without masses or organomegaly, no rebound tenderness, guarding, or rigidity  GENITOURINARY: Not indicated  LYMPHATIC: Cervical, supraclavicular, and inguinal nodes without lymphadenopathy  MUSCULOSKELETAL: Moves all extremities, no obvious muscle wasting  NEUROLOGIC: No gross deficits, normal gait  SKIN: Appropriate color for race, warm and dry, no rashes or lesions to unclothed skin  PSYCHIATRIC: Pleasant and interactive, affect bright, makes appropriate eye contact, thought process linear    Labs:      8/29/2019  Day 1   Hemoglobin 11.7 - 15.7 g/dL 12.0   Hematocrit 35.0 - 47.0 % 36.8   Platelet Count 150 - 450 10e9/L 448   Absolute Neutrophil 1.6 - 8.3 10e9/L 3.1   Sodium 133 - 144 mmol/L 133   Potassium 3.4 - 5.3 mmol/L 3.4   Chloride 94 - 109 mmol/L 101   Carbon Dioxide 20 - 32 mmol/L 27   Urea Nitrogen 7 - 30 mg/dL 7   Creatinine 0.52 - 1.04 mg/dL 0.56   Calcium 8.5 - 10.1 mg/dL 9.1   Magnesium 1.6 - 2.3 mg/dL 1.3 (A)   Bilirubin Total 0.2 - 1.3 mg/dL 1.1   ALT 0 - 50 U/L 60 (A)   AST 0 - 45 U/L 51 (A)   Alkaline Phosphatase 40 - 150 U/L 910 (A)   Albumin 3.4 - 5.0 g/dL 2.7 (A)   Protein Total 6.8 - 8.8 g/dL 7.1   WBC 4.0 - 11.0 10e9/L 6.0    pending    Assessment/Plan:  1) Recurrent ovarian cancer: Tolerating gemcitabine fair without dose limiting effects, proceed with C2 gemcitabine 800mg IV on days 1 and 8 as originally ordered by Dr. Yeung.To receive three cycles followed by imaging and treatment planning with Dr. Yeung. Reviewed signs and symptoms for when she should contact the clinic or seek additional care, including but not limited to fever, chills, inability to keep down food or fluids, nausea and vomiting not controlled with antiemetics, and diarrhea  leading to dehydration. Patient to contact the clinic with any questions or concerns in the interim.   2) Chemotherapy/disease effects:   Nausea, vomiting, low appetite: Only mildly improved after her ERCP. Will trial dronabinol 2.5mg AC BID, reviewed safety with this. Continue with anti-emetics as needed, discussed eating small frequent calorically dense meals. Palliative medicine referral scheduled for next week. Will add in Zofran pre-med today given that she had nausea the night of her chemo this past cycle, to start Compazine tonight.   Abdominal pain: Improved after ERCP but still present at times. To stop tramadol completely. May take oxycodone 5mg q6h PRN. I recommend against Tylenol given her liver function and against ibuprofen given risk of bleeding, however, her platelet level is normal at the time being- if she feels she really needs to use ibuprofen, may do so cautiously and sparingly- 400mg PO daily while platelets are normal.   Fatigue: Worsening over time. Continue with physical activity and periods of rest. Palliative appt next week.   Hypomagnesemia: Improved but still subnormal. Continue oral supplement, to have replaced in infusion.   Elevated LFTs: Known hepatic metastasis, biliary stricture, an chronic cholecystitis. Repeat ERCP in October. Will continue to check weekly CMP. Reviewed when to seek care.   Thrush: Nystatin 500,000 units QID x14 days  3) Genetic counseling: Panel testing negative  4) Health maintenance issues discussed include to follow up with PCP for non-gynecologic concerns and co-morbid conditions  5) Patient verbalized understanding of and agreement with plan    A total of 30 minutes of face to face time were spent with the patient with over 50% of that time spent in counseling, coordination of care, education, and symptom management.    HAILE De Paz, FNP-C  Division of Gynecologic Oncology  [facility]  Pager: [phone]

## 2019-08-29 NOTE — TELEPHONE ENCOUNTER
Prior Authorization Approval    Authorization Effective Date: 7/30/2019  Authorization Expiration Date: 8/28/2022  Medication: Oxycodone- Approved  Approved Dose/Quantity: 5mg up to 120/30 day supply  Reference #: Case# [id number]   Insurance Company: [employer] - Phone [phone] Fax [phone]  Expected CoPay:       CoPay Card Available: No    Foundation Assistance Needed: N/A  Which Pharmacy is filling the prescription (Not needed for infusion/clinic administered): [id number] - [place], [place] - [address] [address]  Pharmacy Notified: Yes  Patient Notified: No

## 2019-08-29 NOTE — PROGRESS NOTES
Infusion Nursing Note:  Odalys Nathan presents today for gemzar, IVF, mag.    Patient seen by provider today: Yes: Patricia   present during visit today: Not Applicable.    Note: received 1 L NS, 2 Gms Mag, Gemzar    Intravenous Access:  Implanted Port.    Treatment Conditions:  Lab Results   Component Value Date    HGB 12.0 08/29/2019     Lab Results   Component Value Date    WBC 6.0 08/29/2019      Lab Results   Component Value Date    ANEU 3.1 08/29/2019     Lab Results   Component Value Date     08/29/2019      Lab Results   Component Value Date     08/29/2019                   Lab Results   Component Value Date    POTASSIUM 3.4 08/29/2019           Lab Results   Component Value Date    MAG 1.3 08/29/2019            Lab Results   Component Value Date    CR 0.56 08/29/2019                   Lab Results   Component Value Date    JOSE ALBERTO 9.1 08/29/2019                Lab Results   Component Value Date    BILITOTAL 1.1 08/29/2019           Lab Results   Component Value Date    ALBUMIN 2.7 08/29/2019                    Lab Results   Component Value Date    ALT 60 08/29/2019           Lab Results   Component Value Date    AST 51 08/29/2019     Mag 1.3    Results reviewed, labs MET treatment parameters, ok to proceed with treatment.      Post Infusion Assessment:  Patient tolerated infusion without incident.  Blood return noted pre and post infusion.  Site patent and intact, free from redness, edema or discomfort.  No evidence of extravasations.  Access discontinued per protocol.       Discharge Plan:   Patient declined prescription refills.  Patient and/or family verbalized understanding of discharge instructions and all questions answered.  AVS to patient via [facility].  Patient will return 9/5 for next appointment.   Patient discharged in stable condition accompanied by: self and .  Departure Mode: Ambulatory.    Keisha Ferguson, RN, RN

## 2019-08-29 NOTE — PROGRESS NOTES
Nursing Note:  Odalys Nathan presents today for port draw.    Patient seen by provider today: Yes: Patricia Rocha   present during visit today: Not Applicable.    Note: N/A.    Intravenous Access:  Labs drawn without difficulty.  Implanted Port.    Discharge Plan:   Patient was sent to [facility] for provider/infusion appointment.    Gloria Proctor, RN, RN

## 2019-08-29 NOTE — PATIENT INSTRUCTIONS
Try adding in more calorically dense foods- olive oil, avocados, coconut oil in your coffee, nut butters, etc. Try a nutritional supplement drink.  Starting Marinol one tablet before breakfast and dinner. Space out at least two hours from oxycodone.  Stop tramadol- take oxycodone if you need it. For right now, your platelets are okay so I am okay with you taking ibuprofen 400mg once daily maximum. If your platelets drop or you start having any bleeding, you need to stop the ibuprofen.  Adding in fluids to your chemo days  Adding in Zofran pre-med to chemo days as well  Take a Compazine tonight for nausea prevention  Adding in a metabolic panel to your day eight labs, continue with weekly CMP draws  See Dr. Strauss with palliative medicine next week

## 2019-08-29 NOTE — TELEPHONE ENCOUNTER
PA Initiation    Medication: Oxycodone- PENDING  Insurance Company: [employer] - Phone [phone] Fax [phone]  Pharmacy Filling the Rx: [facility] #[id number] - [place], [place] - [address]  Filling Pharmacy Phone:    Filling Pharmacy Fax:    Start Date: 8/29/2019

## 2019-08-29 NOTE — NURSING NOTE
"Oncology Rooming Note    August 29, 2019 9:36 AM   Odalys Nathan is a 59 year old female who presents for:    Chief Complaint   Patient presents with     Oncology Clinic Visit     Ovarian cancer, right/left      Initial Vitals: /64   Pulse 100   Temp 96.9  F (36.1  C) (Tympanic)   Resp 16   Ht 1.651 m (5' 5\")   Wt 57.3 kg (126 lb 4 oz)   SpO2 99%   BMI 21.01 kg/m   Estimated body mass index is 21.01 kg/m  as calculated from the following:    Height as of this encounter: 1.651 m (5' 5\").    Weight as of this encounter: 57.3 kg (126 lb 4 oz). Body surface area is 1.62 meters squared.  No Pain (0) Comment: Data Unavailable   No LMP recorded. Patient has had a hysterectomy.  Allergies reviewed: Yes  Medications reviewed: Yes    Medications: Medication refills not needed today.  Pharmacy name entered into [facility]:    [id number] - [place], [place] - [address]  [facility] [place] - [address] [facility] [place], [place] - [address] [address]    Clinical concerns: follow up       Sheeba Barnett [facility]              "

## 2019-08-29 NOTE — LETTER
2019         RE: Odalys Nathan  [id number]  [facility] [id number]        Dear Colleague,    Thank you for referring your patient, Odalys Nathan, to the [facility]. Please see a copy of my visit note below.    Gynecologic Oncology Follow-Up Note    RE: Odalys Nathan  MRN: [id number]  : 1960  Date of Visit: 2019    CC: Odalys Nathan is a 59 year old year old female with recurrent ovarian cancer who presents today for follow up regarding disease management.    HPI: Odalys comes to the clinic accompanied by her  Marcos. She is feeling fair today. She had an ERCP and stent placement on , was found to have chronic cholecystitis and biliary stricture. She found immediate relief of her RUQ pain, though since then her pain has been gradually increasing. She takes either tramadol or oxycodone as needed but never together. States both are equally effective and denies significant sedation or side effects with either medication. Taking pain medication roughly once every other day which is an improvement for her. She does continue to have a low appetite and early satiety- feels nauseated when she eats more than a small amount at a time. Trying to take in one Glucerna shake daily. No emesis. She is open to trying an appetite stimulant. Alternating between constipation and diarrhea, though diarrhea has been her baseline for years. Continues with fatigue, able to complete ADLs and rest. No fevers, chills, bleeding. Denies urinary concerns, SOB, chest pain. Felt she tolerated gemcitabine itself well, though she did have nausea in the evening on day one. She feels she is drinking less fluid than normal and would like IV fluids with her scheduled chemotherapy. She plans to see palliative medicine as scheduled next week. Will have repeat ERCP in October. Feels ready for more treatment today.        Oncology History:  2012 - Admitted to hospital for 2  weeks of intermittent abdominal cramping, distention, diarrhea and N/V. CT of abdomen/pelvis significant for small bowel obstruction, a heterogenous soft tissue density in the pelvis, omental nodules, and ascites. Bilateral adnexal masses per U/S of pelvis. CA-125 was elevated at 987, CEA was normal at 1.0.    1/18/12 - Therapeutic paracentesis (4 L) with cytology confirming malignancy (CA positive, weak ER positive, CK-7 positive consistent with GYN primary). Surgery recommended d/t potential for falling blood counts 2/2 chemotherapy (patient is Restorationism and limiting blood transfusion)    2/1/12 - Exploratory laparotomy, MAR BSO, lysis of adhesion, Appendectomy, Repair cystotomy, Omentectomy Okyz-lvvcwt-weowdoiuc-transverse-colon resection, Ileo-descending colon anastomosis, CUSA, & Colonoscopy (done by Dr. Amato and colorectal team).  2/20/2012 - Admitted to hospital for bilateral pulmonary emboli and drainage of pleural effusion. Started on Lovenox.  2/28/12-3/21/12: Cycle #1-2 Carbo/Taxol IV  3/29/12 - Started on Keflex by her PCP for infection in her healing wound (immediately below her umbilicus).    4/11/12-6/14/12: Cycle #3-6 IV chemo, Cycle #1 IV/IP chemo  7/16/12 -  8, CT PRADEEP - enrolled in  - observation arm  3/4/13-6/28/13:  6, 9, 12, 15, 20.  7/1/13: CT Chest, Abdomen, Pelvis IMPRESSION:  1. Worsening metastatic ovarian carcinoma suggested by increased size of soft tissue nodules anterior to the right psoas muscle, that may represent growing mesenteric lymphadenopathy.  2. Remaining prominent right lower quadrant mesenteric lymph nodes are not significantly changed from CT 7/16/2012.  3. Clustered nodular opacities in the right lower lobe are not significant change from 7/16/2012 and remain indeterminate. Again, the appearance and distribution is suggestive of an infectious etiology.  4. Stable 8 mm soft tissue nodule in left breast, unchanged since at least 02/20/2012. This  can be observed on followup studies, but correlation with mammography could be considered.  Decision made to start Doxil/Carbo.  7/11/13: The left ventricular ejection fraction is normal at 66.4%.  7/12/13-9/6/13: Cycle #1-3 Doxil/Carbo.  10, 11.    9/30/13 CT C/A/P Impression:  1. Overall, favorable response to treatment with decreasing size of soft tissue nodules tracking along the anterior aspect of the right psoas muscle.  2. 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  3. Improved cluster of right lower lobe pulmonary nodular opacities. These may represent resolving infection.  10/3/13-12/9/13:  9, 8, 10. Cycle 4-6 Doxil/Carbo.    1/13/14 CT C/A/P Impression:   1. Stable appearance of metastatic ovarian cancer. Scattered soft tissue nodules along the anterior aspect of the right psoas muscle are unchanged in size. Mild mesenteric lymphadenopathy is unchanged.  2. Clustered micronodules in the right lower lobe are unchanged from 9/30/2013, but improved from 7/1/2013. This history suggests a postinflammatory/postinfectious etiology.  3. 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  1/16/14 Discussed multiple options for her based on relatively stable-appearing disease on CT but slight increase in  (which has had small increase to 20 with last recurrence) including chemo break with recheck of  in 1 month, starting new chemo agent immediately, and exploratory surgery with possible resection of nodules. She is considered platinum-sensitive based on > 1 year remission after Taxol/Carbo, which we will take into consideration for future chemo planning. I am not inclined to surgery at this time given difficulty she already has with diarrhea secondary to past colon resection. I suspect we would need to resect further bowel due to mesenteric disease. Also explained inherent risks of any major surgery. Also mentioned maintenance chemo, but this has not been shown to increase overall survival  and would likely decrease her quality of life without significant benefit. Family is going on vacation to [place] in 2 weeks and Odalys does not want to have chemo prior to that, so will plan to take 1 month break. She can have  at that time (discussed checking toady since last draw was in early December, but as it would likely not change treatment plan and she has h/o slow rising , will not check today).  2/17/14  16    2/20/14: Decision to take break from chemo for two months, followed by CT and CA-125.    4/21/14  27  4/21/14 CT C/A/P Impression:    1. Increased size of 2 low-attenuation lymph nodes anterior to the right psoas muscle is concerning for worsening metastatic ovarian cancer.    2. New circumferential thickening of a 3.8 cm length segment of distal transverse colon is likely physiologic. Recommend attention on followup imaging.    3. Grossly unchanged size of clustered small nodules versus scarring in the right lower lobe the lungs.    4. Stable thrombosis of the right ovarian vein.  5/14/14: Diagnostic laparoscopy converted to exploratory laparotomy and removal of mesenteric masses, tumor debulking, peritoneal biopsies and intraperitoneal port placement. On laparoscopy, it was noted that there were small nodules on the anterior abdominal wall near the previous incision, small nodules on the right pelvic sidewall as well. Nodules were palpated in the mesentery; however, as it was unable to clarify where the origin of the nodules was, the decision was made to open the patient. On opening there was found to be approximately a 3 cm nodule in the small bowel mesentery and another separate approximately 2 cm nodule in the bowel mesentery. Pelvis without evidence of cancer, some mesenteric lymph nodes were palpated. No evidence otherwise of any disseminated cancer throughout the abdomen.    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 (2/2)  -Largest metastasis: 1.3 cm  -See comment  D: Peritoneum, right paracolic gutter #2, biopsy:  -Fibroadipose tissue with granulomatous inflammation surrounding refractile material  -Negative for malignancy   E: Small bowel adhesion, biopsy:  -Fibroconnective tissue, consistent with adhesion  -Negative for malignancy  F: Lymph nodes, mesentry, not otherwise specified, excision:  -Two lymph nodes, negative for metastatic carcinoma (0/2)  G: Lymph node, mesentery, \"#2\", excision:  -One lymph node, negative for metastatic carcinoma (0/1)  H: Lymph nodes, mesentery, \"nodule #2\", excision:  -Five lymph nodes, negative for metastatic carcinoma (0/5)  COMMENT:  Some of the specimens show post-operative changes. Others show possible treatment related changes, including necrosis. The metastatic carcinoma in the mesenteric lymph nodes (specimen C) shows variable morphology, including relatively low grade tumor with papillary architecture, and high grade tumor comprised of nests of tumor cells with irregular, slit-like spaces and marked nuclear pleomorphism.    5/29/14: Cycle 1 IV PACLitaxel / IP CISplatin / IP PACLitaxel.  - 28.  6/26/14: Cycle #2 IV/IP.  10  8/5/14: CT chest/abd/pelvis IMPRESSION     1. In this patient with ovarian cancer, overall findings are indicative of stable/slight improvement, as multiple mesenteric lymphadenopathy and scattered nodular peritoneal soft tissue mass lesions appear unchanged or slightly smaller since 4/21/2014.    2. 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 Mild dilatation of the second and the third portion of the duodenum with a narrow SMA angle. This " could represent SMA syndrome, if clinically correlated.17/14:    Cycle #3 IV/IP.  10.    CT chest/abd/pelvis with contrast on 8/5/14    Impression:    1. In this patient with ovarian cancer, overall findings are indicative of stable/slight improvement, as multiple mesenteric lymphadenopathy and scattered nodular peritoneal soft tissue mass lesions appear unchanged or slightly smaller since 4/21/2014.    2. 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 Mild dilatation of the second and the third portion of the duodenum with a narrow SMA angle. This could represent SMA syndrome, if clinically correlated.  8/7/14: Cycle #4 Taxol/Carbo (changed from IV/IP).  10. She has been feeling okay. She is unsure if she can finish out the course of 6 cycles IV/IP taxol/cisplatin. She feels like she has the flu for about a week then starts feeling gradually better after each chemo cycle. Her spouse notes that she actually has been more sick with the treatments than she initially admits here. She was also previously having some rib pain. Denies any rib pain now. Denies any chest pain or shortness of breath.  Plan: discussed recent CT cap results and switching to just IV as she is feeling miserable with IP treatments. Switch to IV carbo/taxol as patient is platinum sensitive.  We also discussed her taking part of the tesaro trial, which would require BRCA testing. She would like to take part in this trial if eligible.  8/20/14: Remove Intraperitoneal Port ( Port and catheter intact - discarded)  8/28/14: Cycle #5 Taxol/Carbo held due to thrombocytopenia.  6.    She denies any vaginal bleeding, no changes in her bowel or bladder habits, no nausea/emesis, no lower extremity edema, and no difficulties eating or sleeping. She denies any abdominal discomfort/bloating, no fevers or chills, and no chest pain or shortness of breath. She states her diarrhea is the same. She reports some fatigue which improves  about 1-2 weeks after her chemotherapy. She states she does not need any medication refills and she was told she does not meet the criteria for the TESARO trial. She states she has 3 bags of iv fluids left over from her previous chemotherapy and will give these to herself. She states she is ready for her treatment today.    9/29/14: Cycle #6 Taxol/Carbo  6. Insurance questions regarding GSF coverage today. No concerns other than fatigue. Taking iron for anemia and does not desire blood transfusion. Using neulasta for neutropenia. Using home IV hydration if needed. Baseline unchanged. No abdominal bloating, constipation, diarrhea, pain, vaginal or rectal bleeding, cough or dyspnea, fluid retention.    10/16/14: Impression:    1. Nodular peritoneal soft tissue mass in the right lower quadrant adjacent to the psoas muscle is no longer appreciated. Adjacent prominent lymphadenopathy is unchanged from previous exam. No new peritoneal lesions.    2. 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     10/20/14:  5. CT chest/abdomen/pelvis on 10/16/14 showed nodular peritoneal soft tissue mass in the right lower quadrant adjacent to the psoas muscle is no longer appreciated. Adjacent prominent lymphadenopathy is unchanged from previous exam. 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  1/27/15:  6.  4/28/15:  14.  5/26/15:  18.  6/2/15: CT cap Impression:  1. Postsurgical changes of hysterectomy and bilateral salpingo-oophorectomy for ovarian cancer. There is a new 8 mm hazy, ill-defined hypoattenuating lesion in hepatic segment 6 which is suspicious for a metastatic deposit. Further evaluation with ultrasound in recommended.    2. Increased size of a left retroperitoneal lymph node which is indeterminate but may represent a ciro metastasis. Mildly prominent lymph nodes in the right lower quadrant are not significantly changed.  3. Moderate  colonic stool burden.    6/4/15: US abdomen IMPRESSION:    Hyperechoic lesion in the right hepatic lobe, consistent with hemangioma. This does not corresponds to the area of the lesion seen on CT from 6/2/2015. An MR would be helpful for identifying and characterizing the lesion from the recent CT.  6/12/15: MR abd IMPRESSION:  1. New 20 x 11 mm enhancing lesions between the right obliques, concerning for metastatic disease. This lesions should be amenable to percutaneous biopsy, if indicated.  2. Correlating to the lesion visualized on comparison CT is a hepatic segment 6 subcapsular 7 mm lesion. Overall the appearance favors the diagnosis of a simple cyst. However, there is faint suggestion of mild peripheral arterial enhancement. Although this is favored as  artifactual, this should be followed up to confirm stability. Recommend 6 month followup.  3. Hepatic segment 6, 5 mm lesion too small to technically characterize. Differential would favor FNH, less likely flash filling hemangioma. Recommend attention on followup.  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      9/1/15: Cycle #1 Avastin/Cytoxan.   31.      9/24/15:feeling generally well. She says she has been having back and stomach spasms. She is taking cytosine daily (she ran out yesterday). She also says its affecting her voice. Admits that it burns occasionally. She is eating and drinking normal. She also admit diarrhea, 5-7 times daily, lose/watery. She trying to stay hydrated and eat fiber. She also says that her body is sore, especially the bottom of her feet. Her blood pressure is normal. She also admits having headache after her first infusion.       9/24/15: Cycle #2 Avastin/Cytoxan.  19.  10/15/15: Cycle #3 Avastin/Cytoxan.  16.      11/6/15: CT c/a/p IMPRESSION:    1. Stable postoperative change of  MAR/BSO for ovarian cancer.  2. The lesion in the right flank abdominal musculature is slightly decreased in size. Otherwise, stable examination.  2. No evidence of metastatic disease in the chest.     11/20/15: Treatment planning visit,  16     11/25/15: surgical pathology report  FINAL DIAGNOSIS:  Soft tissue, right oblique muscle mass, excision:  -Recurrent ovarian serous carcinoma  -Carcinoma is present less than 1 mm from one resection margin  -Background skeletal muscle and fibroadipose tissue     1/4/16:  22  1/11/16-1/27/16: Radiation to right flank x 12 treatments  4/7/2016:  94. CT cap IMPRESSION:    In this patient with ovarian cancer status post MAR/BSO and descending/transverse colectomy:  1. No evidence for malignancy in the chest, abdomen, or pelvis.  2. Stable small hypodense segment 6 liver lesion, appears more likely benign, possibly a cyst.  5/13/16:  124.  6/3/16: PET CT IMPRESSION: In this patient with a history of ovarian cancer:  1. Hypermetabolic and enlarging periaortic and perihepatic lymphadenopathy compatible with metastatic disease, as detailed above.  2. Although hypodense lesion in hepatic segment 6 has been present since 6/2/2015 associated hypermetabolism makes this lesion highly concerning for metastatic disease.     Plan: to start Niraparib under TESARO study.      6/9/16:  137.  6/14/16: Cycle #1 Niraparib.    6/28/16: Cycle #1 D15 Niraparib.    7/5/16:  100.    7/11/16: Cycle #2 Nraparib.   83.     8/3/2016: PET CT IMPRESSION:    In this patient with known history of ovarian cancer:  1) New pleural based nodular opacities in the lateral and inferior aspects of the bilateral lower lobes, worse in the left lung. Likely infection. Close follow up is recommended.      2) Slight decrease in hypermetabolic abdominal lymphadenopathy. 2 hypermetabolic lymph nodes persist.  3) Unchanged right hepatic lobe metastatic lesion.      8/9/16: Cycle #3  Niraparib.  69.  9/6/16: Cycle #4 Niraparib.  53. Dose held due to anemia.  9/13/16: Eval for potential cycle 4 niraparib. Dose held due to anemia.  10/4/16: CT CAP impression:  IMPRESSION: In this patient with a known history of ovarian cancer:  1. There has been interval resolution of pleural-based nodular  opacities which likely represented infection.  2. Abdominal lymphadenopathy in the form of 2 portacaval lymph nodes  have not significantly changed in size, noted to be hypermetabolic on  prior PET/CT.  3. Previously demonstrated metastatic lesion in the right lobe of the  liver is not significantly changed.  10/11/16:  60  11/1/16: C6 niraparib,  75  11/29/16: C7 niraparib.  78. CT CAP impression as follows:  Target lesions (RECIST criteria):       A previously described target lesion superior to the head of the  pancreas (series 2, image 64)  (referred to as a perihepatic node on  6/3/2016) may not be a valid target lesion because it measured less  than 1.5 cm originally. However, this particular node has decreased in  size, now measuring 7 mm in short axis versus 14 mm on 6/3/2016 when  measured in a similar fashion.       2.3 cm short axis portacaval lymph node on series 2 image 67,  previously 2.0 cm on 10/4/2016.       1.2 cm subtle hypodensity in hepatic segment 6 on series 2 image 75,  stable on multiple studies since at least 6/3/2016     Sum of diameters today: 3.5 cm. Sum of diameters 10/4/2016: 3.2 cm.  Growth = 9%.    12/27/16: C8 niraparib.  105.  1/25/17: C9 niraparib.  108.  2/23/17: C10 niraparib.  132.   CT CAP impression:  Sum of target lesion diameters today: 3.7 cm. Sum of target lesion  diameters on 11/28/2016: 3.5 cm. Growth= 6%  1. In this patient with history of ovarian cancer there is stable  disease by RECIST criteria as evidenced by:   1a. Mildly increased size of liver metastasis.  1b. Stable portacaval lymphadenopathy.  1c. No  evidence of metastatic disease in the chest.  2. Trace emphysematous changes of the lungs.  3/22/17: C11 niraparib.  132.  4/19/17: C12 niraparib.  127.  5/16/17: CT CAP IMPRESSION: In this patient with ovarian cancer:  1. Mildly increased size of hepatic metastasis segment 6 with subtle increased capsular retraction.  2. Stable edmundo hepatis nodes, with mild increase in size of periaortic lymph nodes.  3. Prominent left supraclavicular lymph node with subtle increase in size compared to prior studies, particularly comparing to 10/4/2016.  4. Mild subtle groundglass opacities in the right upper lobe, not present on prior study, lesser extent in the right lower lobe.  Findings may represent infection, additional consideration is malignancy (less likely), and attention on follow-up study  Recommended.  Addendum:   Prominent left supraclavicular lymph node (3/25) is stable from most recent CT performed 2/20/2017, currently measuring 14 x 16 mm, previously 14 x 16 mm on 2/20/2017.      The portal caval lymph node/edmundo hepatis lymph node is stable from 2/20/2017, measuring 21 mm.      Clarification of size of the para-aortic lymph node (series 3 image 327). It short axis measurement is 11 mm versus 9 mm on prior study.      Hepatic segment 6 triangular-shaped low density lesion (3/362) measures 14 mm, previously measured 12 mm, demonstrating a  possible/questionable minimal subtle increase in size. Similarly the lymph nodes noted above in the supraclavicular and para-aortic regions demonstrate possible minimal possible subtle increase in size.      5/18/17: Cycle #13 Niraparib.  191.  6/15/17: Cycle #14 Niraparib.  146.  7/13/17: Cycle #15 Niraparib 200 mg.  171     CT (8/9/17):       IMPRESSION:  1. Segment 6 hepatic metastasis is stable to minimally increased in size.  2. Slight increase in multicentric adenopathy, most pronounced at the edmundo hepatis. Additional sites include the  inferior left neck/supraclavicular region and retroperitoneum which appear stable to  minimally increased.      8/10/17:  175  9/14/17: MUGA LVEF 54%  9/22/17: C1D1 carboplatin/Doxil.  187.  10/19/17: C2D1 carboplatin/Doxil.  108.  11/17/17: C3D1 carboplatin/Doxil.  82.  12/14/17: C4D1 carboplatin/Doxil/avastin.  83.  Of note, avastin held on C4D14  1/12/18: C5D1 carboplatin/Doxil/avastin.  80.  1/26/18: platelets 61, patient was not given avastin due to being jehova's witness.   2/9/18: C6D1 carboplatin/Doxil/Avastin.   71.  3/2/18:  49. Three month treatment break.  6/20/2018:  170. CT CAP:  IMPRESSION: In this patient with history of ovarian cancer:  1. Increased size of a right hepatic lobe lesion and numerous edmundo hepatis and retroperitoneal lymph nodes compatible with progression of metastatic disease.  2. New mild intrahepatic biliary ductal dilatation, periportal edema, and pericholecystic fluid. Increased soft tissue fullness in the edmundo hepatis in the expected location of the common hepatic duct. Findings  are suspicious for developing biliary ductal obstruction secondary to worsening metastatic disease in the edmundo hepatis. Correlation with liver function tests is recommended. Right upper quadrant ultrasound  may be beneficial.  3. Unchanged left supraclavicular and right hilar lymphadenopathy in the chest.      8/3/18: C1D1 weekly paclitaxel.  not done.  9/20/18: C2D1 weekly paclitaxel 80mg/m2. Ca 125-56  11/8/2018: C3D1 weekly paclitaxel;  26     12/17/18: Ct cap IMPRESSION:  1. New area of lobulated hypodense nodularity at the far-inferior right liver. This raises the possibility of a new area of hepatic metastasis.  2. Conversely, other nodules within the right liver are smaller suggesting improvement.  3. Improved adenopathy at the abdominal retroperitoneum. Improved left  supraclavicular adenopathy. Stable mildly prominent right  hilar lymph nodes.  4. Previously noted ill-defined soft tissue at the edmundo hepatis region is still present but appears less prominent in size.  5. New finding of segmental wall thickening of the proximal sigmoid colon with some fluid distention of the adjacent colon. This could represent a segmental colitis. Other etiologies not excluded.     12/20/18:   19.  1/22/19:  45.  3/20/19: CT cap IMPRESSION:  1. Progression of disease with increasing size of a right inferior hepatic mass consistent with metastatic disease.  2. Increasing ill-defined multifocal regions of soft tissue at the edmundo hepatis consistent with malignant adenopathy versus malignant implants. Associated increased intrahepatic biliary ductal dilatation.  3. Other areas of progressive adenopathy noted at the left neck base, mediastinum, and abdominal retroperitoneum.  4. New area of carcinomatosis medial to stomach at the left upper abdomen.     3/20/19: Cycle #1 weekly paclitaxel.   180.  5/7/19: Cycle #2 weekly paclitaxel.    142.  6/20/19: Cycle #3 weekly paclitaxel/  259.     7/11/19: CT CAP-IMPRESSION:  1. Slight increased size of the left supraclavicular lymph node.  Slight progression of the mediastinal lymph nodes is also evident. New  periesophageal node as described above. Retroperitoneal nodes are  stable if not slightly smaller. Some of these nodes appear to have  partially calcified suggesting a response to interval therapy.  2. Interval decreased size of the inferior right hepatic lobe lesion  now with an area of central calcification or enhancement. No new liver  lesions. Remaining abdominal organs are grossly unremarkable. No  significant hydronephrosis.  3. Postop changes involving the bowel and pelvic region. No recurrent  pelvic mass.     8/8/19: C1 gemcitabine 800mg/m2 IV days 1&8.  446.    8/23/19: Endoscopic Retrograde Cholangiopancreatogram with 4 mm Hurricane Balloon Dilation, gallbladder  stent and Bile Duct stent placements, biliary Sphincterotomy       8/29/19: C2 gemcitabine 800mg/m2 IV days 1&8.  548.    Past Medical History:   Diagnosis Date     Antiplatelet or antithrombotic long-term use      Ascites      Blood clot in the legs      Diabetes (H)      Ovarian cancer (H)     serous,stg IV     Pleural effusion      Pulmonary embolism (H) 2/2012     Refusal of blood transfusions as patient is Christian      Short gut syndrome      Subclinical hypothyroidism 4/18/2013     Thrombosis of leg        Past Surgical History:   Procedure Laterality Date     COLECTOMY       COLONOSCOPY  2/1/2012    Procedure:COLONOSCOPY; With Biopsy; Surgeon:TONI SULLIVAN; [facility]     ENDOSCOPIC RETROGRADE CHOLANGIOPANCREATOGRAM N/A 8/23/2019    Procedure: Endoscopic Retrograde Cholangiopancreatogram with 4 mm Hurricane Balloon Dilation, gallbladder stent and Bile Duct stent placements, Bilarry Sphincterotomy ;  Surgeon: Catrachito Lloyd MD;  [facility]     HYSTERECTOMY TOTAL ABD, RHIANNA SALPINGO-OOPHORECTOMY, NODE DISSECTION, TUMOR DEBULKING, COMBINED  2/1/2012    Procedure:COMBINED HYSTERECTOMY TOTAL ABDOMINAL, BILATERAL SALPINGO-OOPHORECTOMY, NODE DISSECTION, TUMOR DEBULKING;  Exploratory Laparotomy, Total Abdominal Hysterectomy, Bilateral Salpingo-Oophorectomy, appendectomy,lysis of adhesions, ileal, ascending, transverse and splenic flexure resection, ileal descending bowel renanastomosis, incidental cystotomy repair, CUSA procedure and colonoscopy ; Curtis     INSERT PORT PERITONEAL ACCESS  4/3/2012    Procedure:INSERT PORT PERITONEAL ACCESS; Intraperitoneal Port Placement (c-arm); Surgeon:SAMUEL CARRASCO; [facility]     INSERT PORT PERITONEAL ACCESS  5/14/2014    Procedure: INSERT PORT PERITONEAL ACCESS;  Surgeon: Nga Yeung MD;  [facility]     INSERT PORT VASCULAR ACCESS       LAPAROSCOPY DIAGNOSTIC (GYN)  5/14/2014    Procedure: LAPAROSCOPY DIAGNOSTIC (GYN);  Surgeon:  Nga Yeung MD;  [facility]     LAPAROTOMY EXPLORATORY Right 11/25/2015    Procedure: LAPAROTOMY EXPLORATORY;  Surgeon: Nga Yeung MD;  [facility]     LAPAROTOMY, TUMOR DEBULKING, COMBINED  5/14/2014    Procedure: COMBINED LAPAROTOMY, TUMOR DEBULKING;  Surgeon: Nga Yeung MD;  [facility]     REMOVE CATHETER PERITONEAL N/A 8/20/2014    Procedure: REMOVE CATHETER PERITONEAL;  Surgeon: Nga Yeung MD;  [facility]     VASCULAR SURGERY      stent left iliac vein       Current Outpatient Medications   Medication     albuterol (PROAIR HFA/PROVENTIL HFA/VENTOLIN HFA) 108 (90 Base) MCG/ACT inhaler     Ascorbic Acid (VITAMIN C PO)     Calcium Carbonate-Vitamin D (CALCIUM + D PO)     cyanocobalamin (VITAMIN B12) 1000 MCG/ML injection     diphenoxylate-atropine (LOMOTIL) 2.5-0.025 MG per tablet     Ferrous Sulfate 324 (65 Fe) MG TBEC     HEMP OIL OR EXTRACT OR OTHER CBD CANNABINOID, NOT MEDICAL CANNABIS,     HERBALS     iohexol (OMNIPAQUE) 140 MG/ML solution for oral use     levofloxacin (LEVAQUIN) 500 MG tablet     LEVOTHYROXINE SODIUM PO     loperamide (IMODIUM) 2 MG capsule     LORazepam (ATIVAN) 1 MG tablet     magnesium oxide (MAG-OX) 400 MG tablet     omeprazole (PRILOSEC) 20 MG DR capsule     order for DME     oxyCODONE (ROXICODONE) 5 MG tablet     potassium chloride (KLOR-CON) 20 MEQ packet     prochlorperazine (COMPAZINE) 10 MG tablet     VITAMIN E NATURAL PO     No current facility-administered medications for this visit.      Facility-Administered Medications Ordered in Other Visits   Medication     heparin 100 UNIT/ML injection 500 Units       No Known Allergies    Family History   Problem Relation Age of Onset     Cancer Mother 69        lung, smoker     Cancer Maternal Uncle 65        brain     Colon Cancer Maternal Aunt 80        colon       Social History     Socioeconomic History     Marital status:      Spouse name: Not on file     Number of  "children: Not on file     Years of education: Not on file     Highest education level: Not on file   Occupational History     Not on file   Social Needs     Financial resource strain: Not on file     Food insecurity:     Worry: Not on file     Inability: Not on file     Transportation needs:     Medical: Not on file     Non-medical: Not on file   Tobacco Use     Smoking status: Former Smoker     Years: 8.00     Types: Cigarettes     Last attempt to quit: 1980     Years since quittin.2     Smokeless tobacco: Never Used     Tobacco comment: started at 9 yo and quit at 16 yo   Substance and Sexual Activity     Alcohol use: Yes     Comment: 3x/day wine or jodi     Drug use: No     Sexual activity: Not on file   Lifestyle     Physical activity:     Days per week: Not on file     Minutes per session: Not on file     Stress: Not on file   Relationships     Social connections:     Talks on phone: Not on file     Gets together: Not on file     Attends Protestant service: Not on file     Active member of club or organization: Not on file     Attends meetings of clubs or organizations: Not on file     Relationship status: Not on file     Intimate partner violence:     Fear of current or ex partner: Not on file     Emotionally abused: Not on file     Physically abused: Not on file     Forced sexual activity: Not on file   Other Topics Concern     Parent/sibling w/ CABG, MI or angioplasty before 65F 55M? Not Asked   Social History Narrative     Not on file           ROS  12 point ROS reviewed and negative except as listed in HPI      Physical Exam:    /64   Pulse 100   Temp 96.9  F (36.1  C) (Tympanic)   Resp 16   Ht 1.651 m (5' 5\")   Wt 57.3 kg (126 lb 4 oz)   SpO2 99%   BMI 21.01 kg/m       CONSTITUTIONAL: Alert non-toxic appearing female in no acute distress  HEAD: Normocephalic, atraumatic  EYES: PERRLA; no scleral icterus  ENT: Oropharynx pink without lesions, thick white coating to tongue  NECK: " Neck supple, firm fixed left supraclavicular node roughly 4cm x 4cm, non-tender without erythema or edema, not eroding through skin  RESPIRATORY: Lungs clear to auscultation, no increased work of breathing noted  CV: Regular rate and rhythm, S1S2, no clicks, murmurs, rubs, or gallops; bilateral lower extremities without edema, dorsalis pedis pulses 2+ bilaterally  GASTROINTESTINAL: Normoactive bowel sounds x4 quadrants, abdomen soft, slightly distended, and non-tender to palpation without masses or organomegaly, no rebound tenderness, guarding, or rigidity  GENITOURINARY: Not indicated  LYMPHATIC: Cervical, supraclavicular, and inguinal nodes without lymphadenopathy  MUSCULOSKELETAL: Moves all extremities, no obvious muscle wasting  NEUROLOGIC: No gross deficits, normal gait  SKIN: Appropriate color for race, warm and dry, no rashes or lesions to unclothed skin  PSYCHIATRIC: Pleasant and interactive, affect bright, makes appropriate eye contact, thought process linear    Labs:      8/29/2019  Day 1   Hemoglobin 11.7 - 15.7 g/dL 12.0   Hematocrit 35.0 - 47.0 % 36.8   Platelet Count 150 - 450 10e9/L 448   Absolute Neutrophil 1.6 - 8.3 10e9/L 3.1   Sodium 133 - 144 mmol/L 133   Potassium 3.4 - 5.3 mmol/L 3.4   Chloride 94 - 109 mmol/L 101   Carbon Dioxide 20 - 32 mmol/L 27   Urea Nitrogen 7 - 30 mg/dL 7   Creatinine 0.52 - 1.04 mg/dL 0.56   Calcium 8.5 - 10.1 mg/dL 9.1   Magnesium 1.6 - 2.3 mg/dL 1.3 (A)   Bilirubin Total 0.2 - 1.3 mg/dL 1.1   ALT 0 - 50 U/L 60 (A)   AST 0 - 45 U/L 51 (A)   Alkaline Phosphatase 40 - 150 U/L 910 (A)   Albumin 3.4 - 5.0 g/dL 2.7 (A)   Protein Total 6.8 - 8.8 g/dL 7.1   WBC 4.0 - 11.0 10e9/L 6.0    pending    Assessment/Plan:  1) Recurrent ovarian cancer: Tolerating gemcitabine fair without dose limiting effects, proceed with C2 gemcitabine 800mg IV on days 1 and 8 as originally ordered by Dr. Yeung.To receive three cycles followed by imaging and treatment planning with Dr. Yeung.  Reviewed signs and symptoms for when she should contact the clinic or seek additional care, including but not limited to fever, chills, inability to keep down food or fluids, nausea and vomiting not controlled with antiemetics, and diarrhea leading to dehydration. Patient to contact the clinic with any questions or concerns in the interim.   2) Chemotherapy/disease effects:   Nausea, vomiting, low appetite: Only mildly improved after her ERCP. Will trial dronabinol 2.5mg AC BID, reviewed safety with this. Continue with anti-emetics as needed, discussed eating small frequent calorically dense meals. Palliative medicine referral scheduled for next week. Will add in Zofran pre-med today given that she had nausea the night of her chemo this past cycle, to start Compazine tonight.   Abdominal pain: Improved after ERCP but still present at times. To stop tramadol completely. May take oxycodone 5mg q6h PRN. I recommend against Tylenol given her liver function and against ibuprofen given risk of bleeding, however, her platelet level is normal at the time being- if she feels she really needs to use ibuprofen, may do so cautiously and sparingly- 400mg PO daily while platelets are normal.   Fatigue: Worsening over time. Continue with physical activity and periods of rest. Palliative appt next week.   Hypomagnesemia: Improved but still subnormal. Continue oral supplement, to have replaced in infusion.   Elevated LFTs: Known hepatic metastasis, biliary stricture, an chronic cholecystitis. Repeat ERCP in October. Will continue to check weekly CMP. Reviewed when to seek care.   Thrush: Nystatin 500,000 units QID x14 days  3) Genetic counseling: Panel testing negative  4) Health maintenance issues discussed include to follow up with PCP for non-gynecologic concerns and co-morbid conditions  5) Patient verbalized understanding of and agreement with plan    A total of 30 minutes of face to face time were spent with the patient with  over 50% of that time spent in counseling, coordination of care, education, and symptom management.    HAILE De Paz, FNP-C  Division of Gynecologic Oncology  [facility]  Pager: [phone]            Again, thank you for allowing me to participate in the care of your patient.        Sincerely,        HAILE De Paz CNP

## 2019-08-29 NOTE — PROGRESS NOTES
"SPIRITUAL HEALTH SERVICES Progress Note  [facility]    Saw pt Odalys to reintroduce  services (this author oriented pt to [facility] two years ago).  Her spouse Marcos was present.  Odalys shared that she has been living with ovarian concern for eight years.  Her biggest concern at this time is \"staying on top of it,\" managing her cancer through treatment.  Odalys stays active and likes to play gold and attend classes in ministry at her [employer] (she is Jehovah Witness).  She named Marcos, her three children, and her [employer] as being central to her support network.  Marcos reflected briefly that he zak by \"taking it one day at a time\" but acknowledged his sense of helplessness at times.  Provided reflective listening and validation of feelings.  Informed them how they can request further  support and gave them contact information.    Plan: Pt/family will contact this author as needs arise.    Armando Ahn M.Div., [facility]  Staff   Pager [phone]    "

## 2019-09-05 NOTE — H&P
[facility]  GASTROENTEROLOGY H&P NOTE  Odalys Nathan [id number]   09/05/2019    HPI  58 yo F with metastatic ovarian cancer to the liver on chemo who has been having worsening RUQ pain for the past 1.5 months. RUQ ultrasound showed cholelithiasis and borderline wall thickening. CBD 8 mm. HIDA consistent with chronic cholecystitis with delayed gallbladder filling. She had mild Tbili bump on 8/12 to 1.9 and rising alk phos. Chemo is being held until chronic cholecystitis is resolved. She now presents for ERCP for possible gallbladder stenting and clearance of main duct. TB normal today and alk phos 1,100.     Past Medical History:   Diagnosis Date     Antiplatelet or antithrombotic long-term use      Ascites      Blood clot in the legs      Diabetes (H)      Ovarian cancer (H)     serous,stg IV     Pleural effusion      Pulmonary embolism (H) 2/2012     Refusal of blood transfusions as patient is Protestant      Short gut syndrome      Subclinical hypothyroidism 4/18/2013     Thrombosis of leg        Past Surgical History:   Procedure Laterality Date     COLECTOMY       COLONOSCOPY  2/1/2012    Procedure:COLONOSCOPY; With Biopsy; Surgeon:TONI SULLIVAN; [facility]     ENDOSCOPIC RETROGRADE CHOLANGIOPANCREATOGRAM N/A 8/23/2019    Procedure: Endoscopic Retrograde Cholangiopancreatogram with 4 mm Hurricane Balloon Dilation, gallbladder stent and Bile Duct stent placements, Bilarry Sphincterotomy ;  Surgeon: Catrachito Lloyd MD;  [facility]     HYSTERECTOMY TOTAL ABD, RHIANNA SALPINGO-OOPHORECTOMY, NODE DISSECTION, TUMOR DEBULKING, COMBINED  2/1/2012    Procedure:COMBINED HYSTERECTOMY TOTAL ABDOMINAL, BILATERAL SALPINGO-OOPHORECTOMY, NODE DISSECTION, TUMOR DEBULKING;  Exploratory Laparotomy, Total Abdominal Hysterectomy, Bilateral Salpingo-Oophorectomy, appendectomy,lysis of adhesions, ileal, ascending, transverse and splenic flexure resection, ileal descending bowel renanastomosis, incidental  cystotomy repair, CUSA procedure and colonoscopy ; Curtis     INSERT PORT PERITONEAL ACCESS  4/3/2012    Procedure:INSERT PORT PERITONEAL ACCESS; Intraperitoneal Port Placement (c-arm); Surgeon:SAMUEL CARRASCO; [facility]     INSERT PORT PERITONEAL ACCESS  5/14/2014    Procedure: INSERT PORT PERITONEAL ACCESS;  Surgeon: Nga Yeung MD;  [facility]     INSERT PORT VASCULAR ACCESS       LAPAROSCOPY DIAGNOSTIC (GYN)  5/14/2014    Procedure: LAPAROSCOPY DIAGNOSTIC (GYN);  Surgeon: Nga Yeung MD;  [facility]     LAPAROTOMY EXPLORATORY Right 11/25/2015    Procedure: LAPAROTOMY EXPLORATORY;  Surgeon: Nga Yeung MD;  [facility]     LAPAROTOMY, TUMOR DEBULKING, COMBINED  5/14/2014    Procedure: COMBINED LAPAROTOMY, TUMOR DEBULKING;  Surgeon: Nga Yeung MD;  [facility]     REMOVE CATHETER PERITONEAL N/A 8/20/2014    Procedure: REMOVE CATHETER PERITONEAL;  Surgeon: Nga Yeung MD;  [facility]     VASCULAR SURGERY      stent left iliac vein        No Known Allergies    No current facility-administered medications for this encounter.     Current Outpatient Medications:      Ascorbic Acid (VITAMIN C PO), Take 500 mg by mouth daily , Disp: , Rfl:      Calcium Carbonate-Vitamin D (CALCIUM + D PO), Take 1 tablet by mouth daily., Disp: , Rfl:      cyanocobalamin (VITAMIN B12) 1000 MCG/ML injection, Inject 1 mL (1,000 mcg) into the muscle every 30 days, Disp: 1 mL, Rfl: 11     Ferrous Sulfate 324 (65 Fe) MG TBEC, TAKE ONE TABLET BY MOUTH THREE TIMES DAILY WITH MEALS. TAKE WITH A SMALL AMOUNT OF ORANGE JUICE. DO NOT TAKE WITH CALCIUM, Disp: 90 tablet, Rfl: 11     levofloxacin (LEVAQUIN) 500 MG tablet, Take 1 tablet (500 mg) by mouth daily, Disp: 5 tablet, Rfl: 0     LEVOTHYROXINE SODIUM PO, Take 50 mcg by mouth daily , Disp: , Rfl:      magnesium oxide (MAG-OX) 400 MG tablet, Take 1 tablet (400 mg) by mouth 2 times daily, Disp: 90 tablet, Rfl: 3      omeprazole (PRILOSEC) 20 MG DR capsule, TAKE ONE CAPSULE BY MOUTH ONE TIME DAILY , Disp: 30 capsule, Rfl: 3     potassium chloride (KLOR-CON) 20 MEQ packet, Take 20 mEq by mouth daily, Disp: , Rfl:      VITAMIN E NATURAL PO, Take 100 Units by mouth daily, Disp: , Rfl:      albuterol (PROAIR HFA/PROVENTIL HFA/VENTOLIN HFA) 108 (90 Base) MCG/ACT inhaler, Inhale 2 puffs into the lungs every 6 hours as needed for shortness of breath / dyspnea or wheezing, Disp: 1 Inhaler, Rfl: 1     diphenoxylate-atropine (LOMOTIL) 2.5-0.025 MG per tablet, Take 2 tablets by mouth 4 times daily as needed for diarrhea, Disp: 60 tablet, Rfl: 0     dronabinol (MARINOL) 2.5 MG capsule, Take 1 capsule (2.5 mg) by mouth 2 times daily (before meals), Disp: 60 capsule, Rfl: 3     HEMP OIL OR EXTRACT OR OTHER CBD CANNABINOID, NOT MEDICAL CANNABIS,, , Disp: , Rfl:      HERBALS, daily , Disp: , Rfl:      iohexol (OMNIPAQUE) 140 MG/ML solution for oral use, Mix entire bottle (50ml) of contast with 600ml (20 ounces) of water and drink half 2 hrs prior to CT scan and half 1 hr prior to scan, Disp: 140 mL, Rfl: 0     loperamide (IMODIUM) 2 MG capsule, Take 2 mg by mouth 4 times daily as needed for diarrhea, Disp: , Rfl:      LORazepam (ATIVAN) 1 MG tablet, Take 1 tablet (1 mg) by mouth every 6 hours as needed (Anxiety, Nausea/Vomiting or Sleep), Disp: 30 tablet, Rfl: 2     nystatin (MYCOSTATIN) 481297 UNIT/ML suspension, Take 5 mLs (500,000 Units) by mouth 4 times daily for 14 days, Disp: 473 mL, Rfl: 0     order for DME, Injection Supplies for Vitamin B12: 3cc syringes w/ 27 gauge needles, 1/2 inch length, Disp: 3 each, Rfl: 0     oxyCODONE (ROXICODONE) 5 MG tablet, Take 1 tablet (5 mg) by mouth every 6 hours as needed for severe pain, Disp: 60 tablet, Rfl: 0     prochlorperazine (COMPAZINE) 10 MG tablet, Take 1 tablet (10 mg) by mouth every 6 hours as needed for nausea or vomiting, Disp: 30 tablet, Rfl: 1    Facility-Administered Medications Ordered  in Other Encounters:      heparin 100 UNIT/ML injection 500 Units, 500 Units, Intracatheter, Once, Fabián Lord, JENISE    Family History   Problem Relation Age of Onset     Cancer Mother 69        lung, smoker     Cancer Maternal Uncle 65        brain     Colon Cancer Maternal Aunt 80        colon       Social History     Socioeconomic History     Marital status:      Spouse name: Not on file     Number of children: Not on file     Years of education: Not on file     Highest education level: Not on file   Occupational History     Not on file   Social Needs     Financial resource strain: Not on file     Food insecurity:     Worry: Not on file     Inability: Not on file     Transportation needs:     Medical: Not on file     Non-medical: Not on file   Tobacco Use     Smoking status: Former Smoker     Years: 8.00     Types: Cigarettes     Last attempt to quit: 1980     Years since quittin.2     Smokeless tobacco: Never Used     Tobacco comment: started at 11 yo and quit at 18 yo   Substance and Sexual Activity     Alcohol use: Yes     Comment: 3x/day wine or jodi     Drug use: No     Sexual activity: Not on file   Lifestyle     Physical activity:     Days per week: Not on file     Minutes per session: Not on file     Stress: Not on file   Relationships     Social connections:     Talks on phone: Not on file     Gets together: Not on file     Attends Orthodoxy service: Not on file     Active member of club or organization: Not on file     Attends meetings of clubs or organizations: Not on file     Relationship status: Not on file     Intimate partner violence:     Fear of current or ex partner: Not on file     Emotionally abused: Not on file     Physically abused: Not on file     Forced sexual activity: Not on file   Other Topics Concern     Parent/sibling w/ CABG, MI or angioplasty before 65F 55M? Not Asked   Social History Narrative     Not on file       Review Of Systems  Skin: negative  Eyes:  "negative  Ears/Nose/Throat: negative  Respiratory: No shortness of breath, dyspnea on exertion, cough, or hemoptysis  Cardiovascular: negative  Gastrointestinal: negative and as above  Genitourinary: negative  Musculoskeletal: negative  Neurologic: negative  Psychiatric: negative  Hematologic/Lymphatic/Immunologic: negative and as above  Endocrine: negative      OBJECTIVE:  VS: /80   Pulse 88   Temp 98.2  F (36.8  C) (Oral)   Resp 16   Ht 1.651 m (5' 5\")   Wt 58 kg (127 lb 13.9 oz)   SpO2 98%   BMI 21.28 kg/m     GEN: A&Ox3, NAD, comfortable  CV:  RRR, no M/G/R  PULM:  CTA B/L  ABD: Normoactive bowel sounds, mild RUQ discomfort, ND, NT, no HSM  SKIN: no juandice  EXT: trace LE edema  NEURO: nonfocal      IMPRESSION:  Odalys Nathan is a 59 year old female plan for ERCP with gallbladder stenting for nonoperative management of cholecystitis.      Catrachito Lloyd MD  [facility]  Division of Gastroenterology and Hepatology  [facility] [phone] [address]    "

## 2019-09-05 NOTE — PATIENT INSTRUCTIONS
Abdominal ultrasound to evaluate stent  IV fluids today. We will also schedule IV fluids next week as well.    CT before cycle 3, will see Dr. Yeung at the  on 9/19 with possible chemo afterward    See Dr. Strauss tomorrow    MEDS:  Omeprazole- take daily  Compazine- can take four times daily. Please schedule this to help keep nausea at bay.  Marinol- take twice daily before breakfast and dinner. This can possibly make you sleepy so don't take with oxycodone.  Oxycodone- take for pain. Please do not wait until you are in severe pain. You can take this every four hours, but space out 2 hours before or after the Marinol as it could make you too sleepy.  Do not take Imodium or Lomotil. If you are constipated, you could try senna or Miralax.  Stop nystatin swish as it is making you nauseated. You can try clotrimazole shane five times daily- nothing to eat or drink for half an hour afterward    IMPORTANT! If you develop a fever (100.4F or greater)/chills, are unable to keep down food or fluids, feel lightheaded or dizzy, have worsening abdominal pain, or in general feel unwell please call us at [phone].

## 2019-09-05 NOTE — PROGRESS NOTES
"I stopped by infusion to discuss recent  with Odalys and her . However, she reports feeling quite symptomatic this past week. Due for C2D8 gemcitabine today, last chemotherapy on 8/29 C2D1 gemcitabine. Biliary and hepatic duct stent placement on 8/23 for biliary stricture and chronic cholecystitis.    S:   -Worsening nausea and vomiting. Taking Compazine once per day if she really feels she needs it, but tends to vomit after meals. Thinks she is drinking fluids but not as much as normal because of the general queasy/nauseated feeling. Vomiting green liquid roughly once every other day.   -Decreased appetite: Seems to be secondary to nausea and vomiting. Feels unwell after eating therefore eats about two small meals per day. Had two small bites of a bagel sandwich in infusion. Has started Marinol but is unsure if this has helped.   -RUQ pain: Continues to be bothersome. Had initially improved after hepatic duct and gallbladder stenting, though now worsening over time. Taking oxycodone about once per day at max. Oxycodone does allow her to get restful sleep.   -Fatigue: Normally a very active person. Goes to the grocery store now, does not put things in her cart or put them away ( does this) and she needs a two hour nap afterward.  states she seems \"lifeless\" compared to her baseline. Odalys states she just feels exhausted after activity, feels better if she is able to sleep at night.    -Constipation: Less frequent bowel movements, though when she does have a BM it is on the looser side. Passing flatus. No longer taking loperamide or Lomotil. Nausea/vomiting better when she has had a BM. LBM two days ago.   -Hypotension: Asymptomatic, denies dizziness or lightheadedness. Admits she may not be drinking as much fluid as normal.    O:  BP (!) 86/55   Pulse 104   Temp 97.4  F (36.3  C)   Resp 18   Wt 54.6 kg (120 lb 6.4 oz)   SpO2 97%   BMI 20.04 kg/m      Weight down six pounds the " past week    Constitutional: Alert and oriented chronically ill but non-toxic appearing female, in no acute distress, resting comfortably in chair  Pulmonary: Respirations unlabored  GI: LUQ and LLQ soft, RLQ and RUQ somewhat more firm and slightly tender to palpation but this is consistent with previous exams, negative Najera's sign, no rebound tenderness, guarding, or rigidity  Skin: Color appropriate for race, no scleral icterus or jaundice  Psych: Pleasant and interactive, smiling and making jokes. Affect bright. Thought process linear, appropriate judgment and insight.        9/5/2019  Day 8   Hemoglobin 11.7 - 15.7 g/dL 12.6   Hematocrit 35.0 - 47.0 % 38.5   Platelet Count 150 - 450 10e9/L 316   Absolute Neutrophil 1.6 - 8.3 10e9/L 4.2   Sodium 133 - 144 mmol/L 133   Potassium 3.4 - 5.3 mmol/L 3.4   Chloride 94 - 109 mmol/L 98   Carbon Dioxide 20 - 32 mmol/L 26   Urea Nitrogen 7 - 30 mg/dL 12   Creatinine 0.52 - 1.04 mg/dL 0.66   Calcium 8.5 - 10.1 mg/dL 9.2   Magnesium 1.6 - 2.3 mg/dL 1.3 (A)   Bilirubin Total 0.2 - 1.3 mg/dL 1.0   ALT 0 - 50 U/L 62 (A)   AST 0 - 45 U/L 47 (A)   Alkaline Phosphatase 40 - 150 U/L 997 (A)   Albumin 3.4 - 5.0 g/dL 2.8 (A)   Protein Total 6.8 - 8.8 g/dL 7.2   WBC 4.0 - 11.0 10e9/L 7.2     A/P:    Recurrent ovarian cancer: Feeling poorly over the past week, symptoms not currently well controlled. Unclear whether RUQ pain and nausea/vomiting are related to her recent stent placement, her cancer, her chemotherapy, or all of the above. Reviewed with Dr. Yeung- will give NS 1L IVx1 in infusion and recheck BP. If she remains hypotensive or becomes symptomatic, to be admitted for monitoring and symptom management. Will obtain RUQ US to evaluate recent stent placement- message sent to Dr. Lloyd with hepatobiliary team as well for update. She is seeing Dr. Strauss tomorrow with palliative medicine. In the meantime, I reinforced importance of optimizing her PRN medications and the  "importance of preventing nausea and pain from spiking. To start scheduling Compazine every six hours while awake and to take oxycodone before her pain becomes severe. To push fluids at home, at least 2L daily. To call if she is unable to keep down food or fluids. Reviewed constipation management as well, may consider senna or Miralax if needed. Continue with activity and periods of rest as needed for fatigue, to discuss with Dr. Strauss tomorrow. Overall, Odalys states she is happy though she is not at her baseline. When asked about her quality of life, she stated \"well, I am happy.\" She would like to continue treatment but has concerns it may not be working- reviewed with Dr. Yeung. Will obtain CT CAP with contrast prior to C3 gemcitabine and she will follow up with Dr. Yeung on 9/19 for CT results and possible chemotherapy that day. Will have IV fluids scheduled at least once if not twice next week. We reviewed in detail when to call for symptoms and when to seek further care. She and Marcos verbalized understanding of and agreement with plan.    HAILE De Paz, FNP-C  Gynecologic Oncology  [facility]  Pager: [phone]      "

## 2019-09-05 NOTE — PROGRESS NOTES
Infusion Nursing Note:  Odalys Nathan presents today for labs, IV fluids, Gemzar, IV Mag.    Patient seen by provider today: Yes: Patricia   present during visit today: Not Applicable.    Note: Has c/o nausea/emesis, fatigue, decreased appetite, constipation and sometimes diarrhea as well as pain.  Patricia saw patient today.    Intravenous Access:  Lab draw site port, Needle type port, Gauge 20 3/4in.  Labs drawn without difficulty.  Implanted Port.    Treatment Conditions:  Lab Results   Component Value Date    HGB 12.6 09/05/2019     Lab Results   Component Value Date    WBC 7.2 09/05/2019      Lab Results   Component Value Date    ANEU 4.2 09/05/2019     Lab Results   Component Value Date     09/05/2019      Results reviewed, labs MET treatment parameters, ok to proceed with treatment.      Post Infusion Assessment:  Patient tolerated infusion without incident.  Blood return noted pre and post infusion.  Site patent and intact, free from redness, edema or discomfort.  No evidence of extravasations.  Access discontinued per protocol.       Discharge Plan:   Discharge instructions reviewed with: Patient.  Patient discharged in stable condition accompanied by: .  Departure Mode: Ambulatory.  Next lab and chemo scheduled for 9/12/19.    ARNOLD GILLETTE, RN, RN

## 2019-09-09 NOTE — PROGRESS NOTES
Called and left message to review labs and check in with Odalys. Given number for callback.  HAILE De Paz, FNP-C  Gynecologic Oncology  [facility]  Pager: [phone]

## 2019-09-09 NOTE — TELEPHONE ENCOUNTER
move ERCP to 9/12 please   Received: Today   Message Contents   Eryn Diop RN Kittilstved, Haylee   Cc: Catrachito Lloyd MD             She should be aware that you are calling.  Let me know if you need me to call also.   Thanks   Eryn BRIDGES for patient in regards to message received above. Informed patient of schedule change. Left direct line for patient to call back to go over new scheduling details.     9/9/19 248pm

## 2019-09-09 NOTE — PROGRESS NOTES
Infusion Nursing Note:  Odalys Nathan presents today for labs, IVF, K, Mg.    Patient seen by provider today: No   present during visit today: Not Applicable.    Note: Pt complains of feeling fatigued today but denies dizziness.  Will have both K and Mg replaced per protocol.  Pt's Bili is up.  Patricia Rocha NP, aware.  Scan done last week show stents patent.  Instructed pt that if she develops RUQ pain or nausea/vomiting to go to ED for evaluaion.    Intravenous Access:  Labs drawn without difficulty.  Implanted Port.    Treatment Conditions:  Lab Results   Component Value Date     09/09/2019                   Lab Results   Component Value Date    POTASSIUM 3.1 09/09/2019           Lab Results   Component Value Date    MAG 1.1 09/09/2019            Lab Results   Component Value Date    CR 0.58 09/09/2019                   Lab Results   Component Value Date    JOSE ALBERTO 8.4 09/09/2019                Lab Results   Component Value Date    BILITOTAL 2.1 09/09/2019           Lab Results   Component Value Date    ALBUMIN 2.3 09/09/2019                    Lab Results   Component Value Date    ALT 80 09/09/2019           Lab Results   Component Value Date    AST 68 09/09/2019           Post Infusion Assessment:  Patient tolerated infusion without incident.  Blood return noted pre and post infusion.  Site patent and intact, free from redness, edema or discomfort.  No evidence of extravasations.  Access discontinued per protocol.       Discharge Plan:   Discharge instructions reviewed with: Patient.  Patient and/or family verbalized understanding of discharge instructions and all questions answered.  AVS to patient via [email or web address].  Patient will return 9/12/19 for labs, IVF, possible K/Mg for next appointment.   Patient discharged in stable condition accompanied by: .  Departure Mode: Ambulatory.    Loree Ellis, RN, RN

## 2019-09-10 PROBLEM — C56.9 OVARIAN CANCER (H): Status: ACTIVE | Noted: 2019-01-01

## 2019-09-10 NOTE — H&P
[facility] History and Physical    Odalys Nathan MRN# [id number]   Age: 59 year old YOB: 1960     Date of Admission:  9/9/2019    Primary care provider: Aubrie Hester             Chief Complaint:   Fatigue  Confusion  Headache         History of Present Illness:   This patient is a 59 year old female with recurrent ovarian cancer who presents with worsening fatigue, abdominal pain, and several week history of headache, shortness of breath, and weight loss. She is currently receiving Gemzar (C2D8 9/5) with the plan to receive 3 cycles and repeat imaging. She recently had an ERCP and stent placed on 8/23 due to worsening disease causing chronic cholecystitis and biliary stricture. Since that time, she says her pain initially improved, but then started increasing. It is in the RLQ and exacerbated with movement. The pain has been keeping her from eating. She has had nausea, but no appetite. She has lost ~25lbs per her 's report. She has not had a BM in 3 days, she usually has loose stools requiring anti-diarrheals. She denies fevers, but has chills. She also reports 3-4 weeks of intermittent headaches that respond to Ibuprofen. She denies any vision changes, focal weakness or numbness. She also has shortness of breath that has worsened over the same time period (3-4 weeks). She denies dizziness, chest pain, or palpitations. She is tired and slept most of the day prior to presenting to the ED. She denies any dysuria, frequency, or urgency.     ED course: In the ED, she had a CT C/A/P that revealed worsening disease in the chest with pleural effusions, carcinomatosis, and adenopathy. There is a mass near the liver that has increased in size, pneumobilia, and biliary dilation, findings concerning for possible cholangitis. She is afebrile, WBC normal, normal HR, hypotensive. Given concern for possible cholangitis, she was started on IV zosyn and GI was consulted. She was judiciously given IVF for  hydration and BP support. She had multiple electrolyte abnormalities, replacement was started. She also has ascites, bowel thickening, and new left hydronephrosis/hydroureter. Given headache, CT head was obtained and showed new right-sided mass. Neurosurgery was consulted and recommended MRI brain, IV steroids, and IV protonix. She was transferred to floor.            Cancer Treatment History:   1/18/2012 - Admitted to hospital for 2 weeks of intermittent abdominal cramping, distention, diarrhea and N/V. CT of abdomen/pelvis significant for small bowel obstruction, a heterogenous soft tissue density in the pelvis, omental nodules, and ascites. Bilateral adnexal masses per U/S of pelvis. CA-125 was elevated at 987, CEA was normal at 1.0.    1/18/12 - Therapeutic paracentesis (4 L) with cytology confirming malignancy (NM positive, weak ER positive, CK-7 positive consistent with GYN primary). Surgery recommended d/t potential for falling blood counts 2/2 chemotherapy (patient is [facility] and limiting blood transfusion)    2/1/12 - Exploratory laparotomy, MAR BSO, lysis of adhesion, Appendectomy, Repair cystotomy, Omentectomy Jdxd-aixloc-xbnenphwi-transverse-colon resection, Ileo-descending colon anastomosis, CUSA, & Colonoscopy (done by Dr. Amato and colorectal team).  2/20/2012 - Admitted to hospital for bilateral pulmonary emboli and drainage of pleural effusion. Started on Lovenox.  2/28/12-3/21/12: Cycle #1-2 Carbo/Taxol IV  3/29/12 - Started on Keflex by her PCP for infection in her healing wound (immediately below her umbilicus).    4/11/12-6/14/12: Cycle #3-6 IV chemo, Cycle #1 IV/IP chemo  7/16/12 -  8, CT PRADEEP - enrolled in  - observation arm  3/4/13-6/28/13:  6, 9, 12, 15, 20.  7/1/13: CT Chest, Abdomen, Pelvis IMPRESSION:  1. Worsening metastatic ovarian carcinoma suggested by increased size of soft tissue nodules anterior to the right psoas muscle, that may represent growing  mesenteric lymphadenopathy.  2. Remaining prominent right lower quadrant mesenteric lymph nodes are not significantly changed from CT 7/16/2012.  3. Clustered nodular opacities in the right lower lobe are not significant change from 7/16/2012 and remain indeterminate. Again, the appearance and distribution is suggestive of an infectious etiology.  4. Stable 8 mm soft tissue nodule in left breast, unchanged since at least 02/20/2012. This can be observed on followup studies, but correlation with mammography could be considered.  Decision made to start Doxil/Carbo.  7/11/13: The left ventricular ejection fraction is normal at 66.4%.  7/12/13-9/6/13: Cycle #1-3 Doxil/Carbo.  10, 11.    9/30/13 CT C/A/P Impression:  1. Overall, favorable response to treatment with decreasing size of soft tissue nodules tracking along the anterior aspect of the right psoas muscle.  2. 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  3. Improved cluster of right lower lobe pulmonary nodular opacities. These may represent resolving infection.  10/3/13-12/9/13:  9, 8, 10. Cycle 4-6 Doxil/Carbo.    1/13/14 CT C/A/P Impression:   1. Stable appearance of metastatic ovarian cancer. Scattered soft tissue nodules along the anterior aspect of the right psoas muscle are unchanged in size. Mild mesenteric lymphadenopathy is unchanged.  2. Clustered micronodules in the right lower lobe are unchanged from 9/30/2013, but improved from 7/1/2013. This history suggests a postinflammatory/postinfectious etiology.  3. 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  1/16/14 Discussed multiple options for her based on relatively stable-appearing disease on CT but slight increase in  (which has had small increase to 20 with last recurrence) including chemo break with recheck of  in 1 month, starting new chemo agent immediately, and exploratory surgery with possible resection of nodules. She is considered platinum-sensitive based on > 1  year remission after Taxol/Carbo, which we will take into consideration for future chemo planning. I am not inclined to surgery at this time given difficulty she already has with diarrhea secondary to past colon resection. I suspect we would need to resect further bowel due to mesenteric disease. Also explained inherent risks of any major surgery. Also mentioned maintenance chemo, but this has not been shown to increase overall survival and would likely decrease her quality of life without significant benefit. Family is going on vacation to [place] in 2 weeks and Odalys does not want to have chemo prior to that, so will plan to take 1 month break. She can have  at that time (discussed checking toady since last draw was in early December, but as it would likely not change treatment plan and she has h/o slow rising , will not check today).  2/17/14  16    2/20/14: Decision to take break from chemo for two months, followed by CT and CA-125.    4/21/14  27  4/21/14 CT C/A/P Impression:    1. Increased size of 2 low-attenuation lymph nodes anterior to the right psoas muscle is concerning for worsening metastatic ovarian cancer.    2. New circumferential thickening of a 3.8 cm length segment of distal transverse colon is likely physiologic. Recommend attention on followup imaging.    3. Grossly unchanged size of clustered small nodules versus scarring in the right lower lobe the lungs.    4. Stable thrombosis of the right ovarian vein.  5/14/14: Diagnostic laparoscopy converted to exploratory laparotomy and removal of mesenteric masses, tumor debulking, peritoneal biopsies and intraperitoneal port placement. On laparoscopy, it was noted that there were small nodules on the anterior abdominal wall near the previous incision, small nodules on the right pelvic sidewall as well. Nodules were palpated in the mesentery; however, as it was unable to clarify where the origin of the nodules was, the  "decision was made to open the patient. On opening there was found to be approximately a 3 cm nodule in the small bowel mesentery and another separate approximately 2 cm nodule in the bowel mesentery. Pelvis without evidence of cancer, some mesenteric lymph nodes were palpated. No evidence otherwise of any disseminated cancer throughout the abdomen.    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 (2/2)  -Largest metastasis: 1.3 cm  -See comment  D: Peritoneum, right paracolic gutter #2, biopsy:  -Fibroadipose tissue with granulomatous inflammation surrounding refractile material  -Negative for malignancy   E: Small bowel adhesion, biopsy:  -Fibroconnective tissue, consistent with adhesion  -Negative for malignancy  F: Lymph nodes, mesentry, not otherwise specified, excision:  -Two lymph nodes, negative for metastatic carcinoma (0/2)  G: Lymph node, mesentery, \"#2\", excision:  -One lymph node, negative for metastatic carcinoma (0/1)  H: Lymph nodes, mesentery, \"nodule #2\", excision:  -Five lymph nodes, negative for metastatic carcinoma (0/5)  COMMENT:  Some of the specimens show post-operative changes. Others show possible treatment related changes, including necrosis. The metastatic carcinoma in the mesenteric lymph nodes (specimen C) shows variable morphology, including relatively low grade tumor with papillary architecture, and high grade tumor comprised of nests of tumor cells with irregular, slit-like spaces and marked nuclear pleomorphism.    5/29/14: Cycle 1 IV PACLitaxel / IP CISplatin / IP PACLitaxel.  - 28.  6/26/14: Cycle #2 IV/IP.  10  8/5/14: CT " chest/abd/pelvis IMPRESSION     1. In this patient with ovarian cancer, overall findings are indicative of stable/slight improvement, as multiple mesenteric lymphadenopathy and scattered nodular peritoneal soft tissue mass lesions appear unchanged or slightly smaller since 4/21/2014.    2. 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 Mild dilatation of the second and the third portion of the duodenum with a narrow SMA angle. This could represent SMA syndrome, if clinically correlated.17/14:    Cycle #3 IV/IP.  10.    CT chest/abd/pelvis with contrast on 8/5/14    Impression:    1. In this patient with ovarian cancer, overall findings are indicative of stable/slight improvement, as multiple mesenteric lymphadenopathy and scattered nodular peritoneal soft tissue mass lesions appear unchanged or slightly smaller since 4/21/2014.    2. 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 Mild dilatation of the second and the third portion of the duodenum with a narrow SMA angle. This could represent SMA syndrome, if clinically correlated.  8/7/14: Cycle #4 Taxol/Carbo (changed from IV/IP).  10. She has been feeling okay. She is unsure if she can finish out the course of 6 cycles IV/IP taxol/cisplatin. She feels like she has the flu for about a week then starts feeling gradually better after each chemo cycle. Her spouse notes that she actually has been more sick with the treatments than she initially admits here. She was also previously having some rib pain. Denies any rib pain now. Denies any chest pain or shortness of breath.  Plan: discussed recent CT cap results and switching to just IV as she is feeling miserable with IP treatments. Switch to IV carbo/taxol as patient is platinum sensitive.  We also discussed her taking part of the tesaro trial, which would require BRCA testing. She would like to take part in this trial if eligible.  8/20/14: Remove Intraperitoneal Port ( Port and  catheter intact - discarded)  8/28/14: Cycle #5 Taxol/Carbo held due to thrombocytopenia.  6.    She denies any vaginal bleeding, no changes in her bowel or bladder habits, no nausea/emesis, no lower extremity edema, and no difficulties eating or sleeping. She denies any abdominal discomfort/bloating, no fevers or chills, and no chest pain or shortness of breath. She states her diarrhea is the same. She reports some fatigue which improves about 1-2 weeks after her chemotherapy. She states she does not need any medication refills and she was told she does not meet the criteria for the TESARO trial. She states she has 3 bags of iv fluids left over from her previous chemotherapy and will give these to herself. She states she is ready for her treatment today.    9/29/14: Cycle #6 Taxol/Carbo  6. Insurance questions regarding GSF coverage today. No concerns other than fatigue. Taking iron for anemia and does not desire blood transfusion. Using neulasta for neutropenia. Using home IV hydration if needed. Baseline unchanged. No abdominal bloating, constipation, diarrhea, pain, vaginal or rectal bleeding, cough or dyspnea, fluid retention.    10/16/14: Impression:    1. Nodular peritoneal soft tissue mass in the right lower quadrant adjacent to the psoas muscle is no longer appreciated. Adjacent prominent lymphadenopathy is unchanged from previous exam. No new peritoneal lesions.    2. 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     10/20/14:  5. CT chest/abdomen/pelvis on 10/16/14 showed nodular peritoneal soft tissue mass in the right lower quadrant adjacent to the psoas muscle is no longer appreciated. Adjacent prominent lymphadenopathy is unchanged from previous exam. 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  1/27/15:  6.  4/28/15:  14.  5/26/15:  18.  6/2/15: CT cap Impression:  1. Postsurgical changes of hysterectomy and  bilateral salpingo-oophorectomy for ovarian cancer. There is a new 8 mm hazy, ill-defined hypoattenuating lesion in hepatic segment 6 which is suspicious for a metastatic deposit. Further evaluation with ultrasound in recommended.    2. Increased size of a left retroperitoneal lymph node which is indeterminate but may represent a ciro metastasis. Mildly prominent lymph nodes in the right lower quadrant are not significantly changed.  3. Moderate colonic stool burden.    6/4/15: US abdomen IMPRESSION:    Hyperechoic lesion in the right hepatic lobe, consistent with hemangioma. This does not corresponds to the area of the lesion seen on CT from 6/2/2015. An MR would be helpful for identifying and characterizing the lesion from the recent CT.  6/12/15: MR abd IMPRESSION:  1. New 20 x 11 mm enhancing lesions between the right obliques, concerning for metastatic disease. This lesions should be amenable to percutaneous biopsy, if indicated.  2. Correlating to the lesion visualized on comparison CT is a hepatic segment 6 subcapsular 7 mm lesion. Overall the appearance favors the diagnosis of a simple cyst. However, there is faint suggestion of mild peripheral arterial enhancement. Although this is favored as  artifactual, this should be followed up to confirm stability. Recommend 6 month followup.  3. Hepatic segment 6, 5 mm lesion too small to technically characterize. Differential would favor FNH, less likely flash filling hemangioma. Recommend attention on followup.  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      9/1/15: Cycle #1 Avastin/Cytoxan.   31.      9/24/15:feeling generally well. She says she has been having back and stomach spasms. She is taking cytosine daily (she ran out yesterday). She also says its affecting her voice. Admits that it burns occasionally. She is eating  and drinking normal. She also admit diarrhea, 5-7 times daily, lose/watery. She trying to stay hydrated and eat fiber. She also says that her body is sore, especially the bottom of her feet. Her blood pressure is normal. She also admits having headache after her first infusion.       9/24/15: Cycle #2 Avastin/Cytoxan.  19.  10/15/15: Cycle #3 Avastin/Cytoxan.  16.      11/6/15: CT c/a/p IMPRESSION:    1. Stable postoperative change of MAR/BSO for ovarian cancer.  2. The lesion in the right flank abdominal musculature is slightly decreased in size. Otherwise, stable examination.  2. No evidence of metastatic disease in the chest.     11/20/15: Treatment planning visit,  16     11/25/15: surgical pathology report  FINAL DIAGNOSIS:  Soft tissue, right oblique muscle mass, excision:  -Recurrent ovarian serous carcinoma  -Carcinoma is present less than 1 mm from one resection margin  -Background skeletal muscle and fibroadipose tissue     1/4/16:  22  1/11/16-1/27/16: Radiation to right flank x 12 treatments  4/7/2016:  94. CT cap IMPRESSION:    In this patient with ovarian cancer status post MAR/BSO and descending/transverse colectomy:  1. No evidence for malignancy in the chest, abdomen, or pelvis.  2. Stable small hypodense segment 6 liver lesion, appears more likely benign, possibly a cyst.  5/13/16:  124.  6/3/16: PET CT IMPRESSION: In this patient with a history of ovarian cancer:  1. Hypermetabolic and enlarging periaortic and perihepatic lymphadenopathy compatible with metastatic disease, as detailed above.  2. Although hypodense lesion in hepatic segment 6 has been present since 6/2/2015 associated hypermetabolism makes this lesion highly concerning for metastatic disease.     Plan: to start Niraparib under TESARO study.      6/9/16:  137.  6/14/16: Cycle #1 Niraparib.    6/28/16: Cycle #1 D15 Niraparib.    7/5/16:  100.    7/11/16: Cycle #2 Nraparib.    83.     8/3/2016: PET CT IMPRESSION:    In this patient with known history of ovarian cancer:  1) New pleural based nodular opacities in the lateral and inferior aspects of the bilateral lower lobes, worse in the left lung. Likely infection. Close follow up is recommended.      2) Slight decrease in hypermetabolic abdominal lymphadenopathy. 2 hypermetabolic lymph nodes persist.  3) Unchanged right hepatic lobe metastatic lesion.      8/9/16: Cycle #3 Niraparib.  69.  9/6/16: Cycle #4 Niraparib.  53. Dose held due to anemia.  9/13/16: Eval for potential cycle 4 niraparib. Dose held due to anemia.  10/4/16: CT CAP impression:  IMPRESSION: In this patient with a known history of ovarian cancer:  1. There has been interval resolution of pleural-based nodular  opacities which likely represented infection.  2. Abdominal lymphadenopathy in the form of 2 portacaval lymph nodes  have not significantly changed in size, noted to be hypermetabolic on  prior PET/CT.  3. Previously demonstrated metastatic lesion in the right lobe of the  liver is not significantly changed.  10/11/16:  60  11/1/16: C6 niraparib,  75  11/29/16: C7 niraparib.  78. CT CAP impression as follows:  Target lesions (RECIST criteria):       A previously described target lesion superior to the head of the  pancreas (series 2, image 64)  (referred to as a perihepatic node on  6/3/2016) may not be a valid target lesion because it measured less  than 1.5 cm originally. However, this particular node has decreased in  size, now measuring 7 mm in short axis versus 14 mm on 6/3/2016 when  measured in a similar fashion.       2.3 cm short axis portacaval lymph node on series 2 image 67,  previously 2.0 cm on 10/4/2016.       1.2 cm subtle hypodensity in hepatic segment 6 on series 2 image 75,  stable on multiple studies since at least 6/3/2016     Sum of diameters today: 3.5 cm. Sum of diameters 10/4/2016: 3.2 cm.  Growth = 9%.     12/27/16: C8 niraparib.  105.  1/25/17: C9 niraparib.  108.  2/23/17: C10 niraparib.  132.   CT CAP impression:  Sum of target lesion diameters today: 3.7 cm. Sum of target lesion  diameters on 11/28/2016: 3.5 cm. Growth= 6%  1. In this patient with history of ovarian cancer there is stable  disease by RECIST criteria as evidenced by:   1a. Mildly increased size of liver metastasis.  1b. Stable portacaval lymphadenopathy.  1c. No evidence of metastatic disease in the chest.  2. Trace emphysematous changes of the lungs.  3/22/17: C11 niraparib.  132.  4/19/17: C12 niraparib.  127.  5/16/17: CT CAP IMPRESSION: In this patient with ovarian cancer:  1. Mildly increased size of hepatic metastasis segment 6 with subtle increased capsular retraction.  2. Stable edmundo hepatis nodes, with mild increase in size of periaortic lymph nodes.  3. Prominent left supraclavicular lymph node with subtle increase in size compared to prior studies, particularly comparing to 10/4/2016.  4. Mild subtle groundglass opacities in the right upper lobe, not present on prior study, lesser extent in the right lower lobe.  Findings may represent infection, additional consideration is malignancy (less likely), and attention on follow-up study  Recommended.  Addendum:   Prominent left supraclavicular lymph node (3/25) is stable from most recent CT performed 2/20/2017, currently measuring 14 x 16 mm, previously 14 x 16 mm on 2/20/2017.      The portal caval lymph node/edmundo hepatis lymph node is stable from 2/20/2017, measuring 21 mm.      Clarification of size of the para-aortic lymph node (series 3 image 327). It short axis measurement is 11 mm versus 9 mm on prior study.      Hepatic segment 6 triangular-shaped low density lesion (3/362) measures 14 mm, previously measured 12 mm, demonstrating a  possible/questionable minimal subtle increase in size. Similarly the lymph nodes noted above in the supraclavicular and  para-aortic regions demonstrate possible minimal possible subtle increase in size.      5/18/17: Cycle #13 Niraparib.  191.  6/15/17: Cycle #14 Niraparib.  146.  7/13/17: Cycle #15 Niraparib 200 mg.  171     CT (8/9/17):       IMPRESSION:  1. Segment 6 hepatic metastasis is stable to minimally increased in size.  2. Slight increase in multicentric adenopathy, most pronounced at the edmundo hepatis. Additional sites include the inferior left neck/supraclavicular region and retroperitoneum which appear stable to  minimally increased.      8/10/17:  175  9/14/17: MUGA LVEF 54%  9/22/17: C1D1 carboplatin/Doxil.  187.  10/19/17: C2D1 carboplatin/Doxil.  108.  11/17/17: C3D1 carboplatin/Doxil.  82.  12/14/17: C4D1 carboplatin/Doxil/avastin.  83.  Of note, avastin held on C4D14  1/12/18: C5D1 carboplatin/Doxil/avastin.  80.  1/26/18: platelets 61, patient was not given avastin due to being jehova's witness.   2/9/18: C6D1 carboplatin/Doxil/Avastin.   71.  3/2/18:  49. Three month treatment break.  6/20/2018:  170. CT CAP:  IMPRESSION: In this patient with history of ovarian cancer:  1. Increased size of a right hepatic lobe lesion and numerous edmundo hepatis and retroperitoneal lymph nodes compatible with progression of metastatic disease.  2. New mild intrahepatic biliary ductal dilatation, periportal edema, and pericholecystic fluid. Increased soft tissue fullness in the edmundo hepatis in the expected location of the common hepatic duct. Findings  are suspicious for developing biliary ductal obstruction secondary to worsening metastatic disease in the edmundo hepatis. Correlation with liver function tests is recommended. Right upper quadrant ultrasound  may be beneficial.  3. Unchanged left supraclavicular and right hilar lymphadenopathy in the chest.      8/3/18: C1D1 weekly paclitaxel.  not done.  9/20/18: C2D1 weekly paclitaxel 80mg/m2. Ca  125-56  11/8/2018: C3D1 weekly paclitaxel;  26     12/17/18: Ct cap IMPRESSION:  1. New area of lobulated hypodense nodularity at the far-inferior right liver. This raises the possibility of a new area of hepatic metastasis.  2. Conversely, other nodules within the right liver are smaller suggesting improvement.  3. Improved adenopathy at the abdominal retroperitoneum. Improved left  supraclavicular adenopathy. Stable mildly prominent right hilar lymph nodes.  4. Previously noted ill-defined soft tissue at the edmundo hepatis region is still present but appears less prominent in size.  5. New finding of segmental wall thickening of the proximal sigmoid colon with some fluid distention of the adjacent colon. This could represent a segmental colitis. Other etiologies not excluded.     12/20/18:   19.  1/22/19:  45.  3/20/19: CT cap IMPRESSION:  1. Progression of disease with increasing size of a right inferior hepatic mass consistent with metastatic disease.  2. Increasing ill-defined multifocal regions of soft tissue at the edmundo hepatis consistent with malignant adenopathy versus malignant implants. Associated increased intrahepatic biliary ductal dilatation.  3. Other areas of progressive adenopathy noted at the left neck base, mediastinum, and abdominal retroperitoneum.  4. New area of carcinomatosis medial to stomach at the left upper abdomen.     3/20/19: Cycle #1 weekly paclitaxel.   180.  5/7/19: Cycle #2 weekly paclitaxel.    142.  6/20/19: Cycle #3 weekly paclitaxel  259.      7/11/19: CT CAP-IMPRESSION:  1. Slight increased size of the left supraclavicular lymph node.  Slight progression of the mediastinal lymph nodes is also evident. New  periesophageal node as described above. Retroperitoneal nodes are  stable if not slightly smaller. Some of these nodes appear to have  partially calcified suggesting a response to interval therapy.  2. Interval decreased size of the  inferior right hepatic lobe lesion  now with an area of central calcification or enhancement. No new liver  lesions. Remaining abdominal organs are grossly unremarkable. No  significant hydronephrosis.  3. Postop changes involving the bowel and pelvic region. No recurrent  pelvic mass.     8/8/19: C1 gemcitabine 800mg/m2 IV days 1&8.  446.     8/23/19: Endoscopic Retrograde Cholangiopancreatogram with 4 mm Hurricane Balloon Dilation, gallbladder stent and Bile Duct stent placements, biliary Sphincterotomy     8/29/19: C2 gemcitabine 800mg/m2 IV days 1&8.  548.         Past Medical History:     Past Medical History:   Diagnosis Date     Antiplatelet or antithrombotic long-term use      Ascites      Blood clot in the legs      Diabetes (H)      Ovarian cancer (H)     serous,stg IV     Pleural effusion      Pulmonary embolism (H) 2/2012     Refusal of blood transfusions as patient is Tenriism      Short gut syndrome      Subclinical hypothyroidism 4/18/2013     Thrombosis of leg             Past Surgical History:      Past Surgical History:   Procedure Laterality Date     COLECTOMY       COLONOSCOPY  2/1/2012    Procedure:COLONOSCOPY; With Biopsy; Surgeon:TONI SULLIVAN; Location:[facility]     ENDOSCOPIC RETROGRADE CHOLANGIOPANCREATOGRAM N/A 8/23/2019    Procedure: Endoscopic Retrograde Cholangiopancreatogram with 4 mm Hurricane Balloon Dilation, gallbladder stent and Bile Duct stent placements, Bilarry Sphincterotomy ;  Surgeon: Catrachito Lloyd MD;  Location: [facility]     HYSTERECTOMY TOTAL ABD, RHIANNA SALPINGO-OOPHORECTOMY, NODE DISSECTION, TUMOR DEBULKING, COMBINED  2/1/2012    Procedure:COMBINED HYSTERECTOMY TOTAL ABDOMINAL, BILATERAL SALPINGO-OOPHORECTOMY, NODE DISSECTION, TUMOR DEBULKING;  Exploratory Laparotomy, Total Abdominal Hysterectomy, Bilateral Salpingo-Oophorectomy, appendectomy,lysis of adhesions, ileal, ascending, transverse and splenic flexure resection, ileal descending bowel  renanastomosis, incidental cystotomy repair, CUSA procedure and colonoscopy ; [facility]     INSERT PORT PERITONEAL ACCESS  4/3/2012    Procedure:INSERT PORT PERITONEAL ACCESS; Intraperitoneal Port Placement (c-arm); Surgeon:SAMUEL CARRASCO; Location:[facility]     INSERT PORT PERITONEAL ACCESS  2014    Procedure: INSERT PORT PERITONEAL ACCESS;  Surgeon: Nga Yeung MD;  Location: [facility]     INSERT PORT VASCULAR ACCESS       LAPAROSCOPY DIAGNOSTIC (GYN)  2014    Procedure: LAPAROSCOPY DIAGNOSTIC (GYN);  Surgeon: Nga Yeung MD;  Location: [facility]     LAPAROTOMY EXPLORATORY Right 2015    Procedure: LAPAROTOMY EXPLORATORY;  Surgeon: Nga Yeung MD;  Location: [facility]     LAPAROTOMY, TUMOR DEBULKING, COMBINED  2014    Procedure: COMBINED LAPAROTOMY, TUMOR DEBULKING;  Surgeon: Nga Yeung MD;  Location: [facility]     REMOVE CATHETER PERITONEAL N/A 2014    Procedure: REMOVE CATHETER PERITONEAL;  Surgeon: Nga Yeung MD;  Location: [facility]     VASCULAR SURGERY      stent left iliac vein            Social History:     Social History     Tobacco Use     Smoking status: Former Smoker     Years: 8.00     Types: Cigarettes     Last attempt to quit: 1980     Years since quittin.2     Smokeless tobacco: Never Used     Tobacco comment: started at 9 yo and quit at 18 yo   Substance Use Topics     Alcohol use: Yes     Comment: 3x/day wine or jodi            Family History:     Family History   Problem Relation Age of Onset     Cancer Mother 69        lung, smoker     Cancer Maternal Uncle 65        brain     Colon Cancer Maternal Aunt 80        colon            Allergies:   No Known Allergies         Medications:       Current Facility-Administered Medications on File Prior to Encounter:  [COMPLETED] 0.9% sodium chloride BOLUS   [COMPLETED] heparin 100 UNIT/ML injection 500 Units   heparin 100 UNIT/ML injection 500 Units   [COMPLETED] potassium chloride 40 mEq,  magnesium sulfate 4 g in sodium chloride 0.9 % 1,000 mL intermittent infusion   [COMPLETED] sodium chloride (PF) 0.9% PF flush 10 mL     Current Outpatient Medications on File Prior to Encounter:  albuterol (PROAIR HFA/PROVENTIL HFA/VENTOLIN HFA) 108 (90 Base) MCG/ACT inhaler Inhale 2 puffs into the lungs every 6 hours as needed for shortness of breath / dyspnea or wheezing   Ascorbic Acid (VITAMIN C PO) Take 500 mg by mouth daily    Calcium Carbonate-Vitamin D (CALCIUM + D PO) Take 1 tablet by mouth daily.   clotrimazole 10 MG rell Take 1 Rell (10 mg) by mouth 5 times daily for 14 days   cyanocobalamin (VITAMIN B12) 1000 MCG/ML injection Inject 1 mL (1,000 mcg) into the muscle every 30 days   diphenoxylate-atropine (LOMOTIL) 2.5-0.025 MG per tablet Take 2 tablets by mouth 4 times daily as needed for diarrhea   dronabinol (MARINOL) 2.5 MG capsule Take 1 capsule (2.5 mg) by mouth 2 times daily (before meals)   Ferrous Sulfate 324 (65 Fe) MG TBEC TAKE ONE TABLET BY MOUTH THREE TIMES DAILY WITH MEALS. TAKE WITH A SMALL AMOUNT OF ORANGE JUICE. DO NOT TAKE WITH CALCIUM   HEMP OIL OR EXTRACT OR OTHER CBD CANNABINOID, NOT MEDICAL CANNABIS,    HERBALS daily    iohexol (OMNIPAQUE) 140 MG/ML solution for oral use Mix entire bottle (50ml) of contast with 600ml (20 ounces) of water and drink half 2 hrs prior to CT scan and half 1 hr prior to scan (Patient not taking: Reported on 9/5/2019)   LEVOTHYROXINE SODIUM PO Take 50 mcg by mouth daily    loperamide (IMODIUM) 2 MG capsule Take 2 mg by mouth 4 times daily as needed for diarrhea   LORazepam (ATIVAN) 1 MG tablet Take 1 tablet (1 mg) by mouth every 6 hours as needed (Anxiety, Nausea/Vomiting or Sleep)   magnesium oxide (MAG-OX) 400 MG tablet Take 1 tablet (400 mg) by mouth 2 times daily   nystatin (MYCOSTATIN) 350085 UNIT/ML suspension Take 5 mLs (500,000 Units) by mouth 4 times daily for 14 days   omeprazole (PRILOSEC) 20 MG DR capsule TAKE ONE CAPSULE BY MOUTH ONE TIME  DAILY    order for DME Injection Supplies for Vitamin B12: 3cc syringes w/ 27 gauge needles, 1/2 inch length   oxyCODONE (ROXICODONE) 5 MG tablet Take 1 tablet (5 mg) by mouth every 6 hours as needed for severe pain   potassium chloride (KLOR-CON) 20 MEQ packet Take 20 mEq by mouth daily   [] potassium chloride ER (K-TAB/KLOR-CON) 10 MEQ CR tablet Take 4 tablets (40 mEq) by mouth once for 1 dose   prochlorperazine (COMPAZINE) 10 MG tablet Take 1 tablet (10 mg) by mouth every 6 hours as needed for nausea or vomiting   VITAMIN E NATURAL PO Take 100 Units by mouth daily            Review of Systems:     CONSTITUTIONAL: + chills, negative for fevers, + fatigue, +anorexia and +weight loss  SKIN: Negative for rashes, lumps, or bumps  HEENT: Negative for vision changes, changes in hearing, sinus drainage, sore throat  RESPIRATORY: Negative for cough, +shortness of breath, +dyspnea and negative wheezing, hemoptysis  CARDIOVASCULAR: Negative for  chest pain, palpitations, edema  GASTROINTESTINAL: +nausea, negative vomiting, +constipation, +abdominal pain, +abdominal distention, negative reflux, hematemesis and hemtochezia  GENITOURINARY: Negative for frequency, dysuria, nocturia, urinary incontinence and hematuria  MUSCULOSKELETAL: Negative for muscle weakness, joint stiffness, joint swelling, +back pain  NEUROLOGIC: + headaches, syncope, +weakness, numbness, tingling, memory problems, or incoordination  PSYCHIATRIC: +anxiety and depression         Physical Exam:     Vitals:    19 2345 09/10/19 0000 09/10/19 0015 09/10/19 0030   BP: 98/57 96/62 97/58 94/54   BP Location:       Pulse:       Resp: 25 29 25 22   Temp:       TempSrc:       SpO2: 96% 96% 96% 97%     Constitutional: Chronically ill appearing woman, no acute distress.  Cardiovascular: Regular rate and rhythm without murmurs  Respiratory: Clear to auscultation bilaterally with faint crackles at the bases  Gastrointestinal: Abdomen soft, moderately  tender in the RUQ. No rebound or guarding. BS normal. No masses.  Neuro: Moving all 4 extremities, symmetric. CN II-XII grossly intact. Alert and oriented.  Skin: No suspicious lesions or rashes  Psychiatric: mentation appears normal, slightly slow to respond to some questions, and affect normal/bright  Lines: Port on right chest         Data:     Results for orders placed or performed during the hospital encounter of 09/09/19 (from the past 24 hour(s))   Lipase   Result Value Ref Range    Lipase 52 (L) 73 - 393 U/L   CBC with platelets differential   Result Value Ref Range    WBC 5.9 4.0 - 11.0 10e9/L    RBC Count 2.99 (L) 3.8 - 5.2 10e12/L    Hemoglobin 9.7 (L) 11.7 - 15.7 g/dL    Hematocrit 29.4 (L) 35.0 - 47.0 %    MCV 98 78 - 100 fl    MCH 32.4 26.5 - 33.0 pg    MCHC 33.0 31.5 - 36.5 g/dL    RDW 14.6 10.0 - 15.0 %    Platelet Count 104 (L) 150 - 450 10e9/L    Diff Method Manual Differential     % Neutrophils 93.9 %    % Lymphocytes 3.5 %    % Monocytes 2.6 %    % Eosinophils 0.0 %    % Basophils 0.0 %    Absolute Neutrophil 5.5 1.6 - 8.3 10e9/L    Absolute Lymphocytes 0.2 (L) 0.8 - 5.3 10e9/L    Absolute Monocytes 0.2 0.0 - 1.3 10e9/L    Absolute Eosinophils 0.0 0.0 - 0.7 10e9/L    Absolute Basophils 0.0 0.0 - 0.2 10e9/L    RBC Morphology Normal     Platelet Estimate Confirming automated cell count    Comprehensive metabolic panel   Result Value Ref Range    Sodium 128 (L) 133 - 144 mmol/L    Potassium 2.8 (L) 3.4 - 5.3 mmol/L    Chloride 95 94 - 109 mmol/L    Carbon Dioxide 22 20 - 32 mmol/L    Anion Gap 11 3 - 14 mmol/L    Glucose 144 (H) 70 - 99 mg/dL    Urea Nitrogen 7 7 - 30 mg/dL    Creatinine 0.56 0.52 - 1.04 mg/dL    GFR Estimate >90 >60 mL/min/[1.73_m2]    GFR Estimate If Black >90 >60 mL/min/[1.73_m2]    Calcium 7.7 (L) 8.5 - 10.1 mg/dL    Bilirubin Total 2.0 (H) 0.2 - 1.3 mg/dL    Albumin 2.0 (L) 3.4 - 5.0 g/dL    Protein Total 6.1 (L) 6.8 - 8.8 g/dL    Alkaline Phosphatase 738 (H) 40 - 150 U/L     ALT 67 (H) 0 - 50 U/L    AST 44 0 - 45 U/L   Lactic acid whole blood   Result Value Ref Range    Lactic Acid 1.0 0.7 - 2.0 mmol/L   Blood culture   Result Value Ref Range    Specimen Description Blood Portacath     Special Requests Aerobic and anaerobic bottles received     Culture Micro PENDING    INR   Result Value Ref Range    INR 1.23 (H) 0.86 - 1.14   Partial thromboplastin time   Result Value Ref Range    PTT 34 22 - 37 sec   Procalcitonin   Result Value Ref Range    Procalcitonin 5.98 (HH) ng/ml   Magnesium   Result Value Ref Range    Magnesium 1.6 1.6 - 2.3 mg/dL   Phosphorus   Result Value Ref Range    Phosphorus 1.6 (L) 2.5 - 4.5 mg/dL   ISTAT gases lactate monika POCT   Result Value Ref Range    Ph Venous 7.45 (H) 7.32 - 7.43 pH    PCO2 Venous 28 (L) 40 - 50 mm Hg    PO2 Venous 32 25 - 47 mm Hg    Bicarbonate Venous 20 (L) 21 - 28 mmol/L    O2 Sat Venous 66 %    Lactic Acid 0.9 0.7 - 2.1 mmol/L   EKG 12 lead   Result Value Ref Range    Interpretation ECG Click View Image link to view waveform and result    Head CT w/o contrast    Narrative    Preliminary report:  This is a preliminary resident interpretation. Full report to follow.         Impression    Impression:   1. New 4.7 x 5.3 x 4.1 cm mass lesion in the right anterior frontal  lobe with surrounding vasogenic edema, most suggestive of intracranial  metastatic disease, new since 8/3/2016.  2. Mass effect on the anterior right cerebrum with effacement of the  right anterior horn, slight subfalcine herniation, and 4 mm  right-to-left midline shift.   CT Abdomen Pelvis w Contrast    Narrative    Preliminary report:  This is a preliminary resident interpretation. Full report to follow.           Impression    IMPRESSION: In this patient with a history of ovarian cancer, findings  are concerning for progression of disease since 7/11/2019:  1. Increased size of the mass in inferior segment 6 in the liver, and  increased infiltrative tissue in the edmundo  hepatis which extend along  the portal vein branches and right bile ducts. This tissue appears to  also extend along the common bile duct to the second segment of the  duodenum, which is potentially invaded as there is new focal wall  thickening/edema and hypoenhancement at this location.  2. Increased effacement of the extrahepatic portal vein secondary to  the worsening edmundo hepatis disease, with unchanged cavernous  transformation and multiple collaterals.  3. New biliary stents in the gallbladder and left lobe of the liver.  Mildly increased intrahepatic biliary ductal dilatation. Increased  pneumobilia, nonspecific given stents. Cholangitis is not excluded  given potentially worsening obstruction.  4. New moderate right hydronephrosis and hydroureter. The right ureter  appears potentially tethered to new enhancing tissue associated with  the right ovarian vein concerning for focal worsening metastatic  disease.  5. Similar to mildly worsened retroperitoneal and mesenteric  adenopathy.  6. New/increased small volume ascites, greatest in the pelvic and left  upper quadrant.  7. New mildly prominent loops of small bowel with mildly thickened  walls in the lower central abdomen, nonspecific. No transition point  to suggest obstruction. Findings may be secondary to venous congestion  given portal findings and worsening ascites, or possibly enteritis.    CT Chest Pulmonary Embolism w Contrast    Narrative    Preliminary report:  This is a preliminary resident interpretation. Full report to follow.         Impression    IMPRESSION:   1. No evidence of acute pulmonary embolism.  2. New small-to-moderate right and trace left pleural effusions with  associated atelectasis.  3. Progression of metastatic disease in the chest, including worsening  mediastinal, hilar, and supraclavicular adenopathy. Increased pleural  thickening at the right apex also concerning for developing pleural  carcinomatosis, possibly contributing  to the pleural effusions.    Blood culture   Result Value Ref Range    Specimen Description Blood Portacath     Special Requests Aerobic and anaerobic bottles received     Culture Micro PENDING             Assessment and Plan:     Assessment: 59 year old HD#1 admitted with abdominal pain and concern for possible cholangitis, and headache with new brain mass/mass effect.     Problem List  1) Recurrent ovarian cancer with new brain mass  2) Biliary obstruction, possible cholangitis  3) Hypotension  4) Bilateral pleural effusions  5) Hydronephrosis, hydroureter  6) Hypothyroidism    Dz: Recurrent ovarian cancer. Dx 2012 s/p debulking, s/p multiple chemo cycles (carbo/taxol, carbo/doxil, IV/IP, avastin/cytoxan, 15C niraparib), interval debulking in 2014 with recurrence in 2015. Most recently 3C single-agent paclitaxel (last 6/20/19) followed by Gemzar C2D8 on 9/5. CT C/A/P 9/9: Inc thoracic and abdominal adenopathy, pleural carcinomatosis and BL effusions, increase in liver mass and infiltration of edmundo hepatis with worsening ascites, right hydronephrosis/hydroureter.  FEN: Regular diet, HypoNa, will correct with goal sodium high normal range,  ml/hr, HypoK>ERP, HypoPh>ERP  Pain: PO oxycodone, IV dilaudid PRN  Heme: Hgb 9.7, Plt 104; Gnosticist  CV: Hypotension, BP improved after IVF bolus in ED. Continue to monitor BP closely.   Pulm: H/o DVT/PE 2012, no current anticoagulation. CT Chest 9/9 neg for PE. Progression of disease with R>L pleural effusions. Comfortable on room air, normal saturations.  GI: ERCP and stent placement 8/23 due to cholecystitis and biliary stricture secondary to disease. CT A/P 9/9 increased pneumobilia, increased biliary duct dilation, possible cholangitis. Worsening edmundo-hepatis. Increased ascites. Tbili 2, AlkPhos 738, ALT 67, AST 44. GI consulted, recommended treating for presumed cholangitis given known obstruction and h/o cholecystitis prior to stent placement. D#1 IV zosyn  q6h. Protonix IV daily.  : Right moderate hydronephrosis, hydroureter with adjacent disease. Cr 0.56. I/Os and daily weights.  ID: Afebrile, WBC 5.9, lactate 1.0. Treating presumed cholangitis as above. Will follow up GI recommendations, possible repeat ERCP.   Endo: Hypothyroidism (synthroid)  Psych/Neuro/MSK: New brain mass, right anterior lobe with vasogenic edema, midline shift, mild subfalcine herniation. Headache, no focal deficits. Neurosurgery consulted. Dexamethasone 4mg IV q6h, MRI brain ordered. Awaiting recommendations pending MRI results.  PPX: Protonix, SCDs, IS  Dispo: Pending workup.  Drains/Lines: Port  Consults: Neurosurgery, GI    Patient discussed with Dr. Angeles Sanchez -- Gyn Onc Fellow      Rafy Santos MD, MPH   Gyn Onc, PGY2  Team Pager: [phone]         Provider Disclosure:   I agree with above History, Review of Systems, Physical exam and Plan. I have reviewed the content of the documentation and have edited it as needed. I have personally performed the services documented here and the documentation accurately represents those services and the decisions I have made.     Electronically signed by:   Rosmery Aranda MD   Gynecologic Oncology   [facility]

## 2019-09-10 NOTE — TELEPHONE ENCOUNTER
LVM for patient in regards to rescheduled procedure with Dr. Lloyd. Left direct line for patient to call to go over new scheduling details.     9/10/19 105pm

## 2019-09-10 NOTE — ED PROVIDER NOTES
History     Chief Complaint   Patient presents with     Fatigue     HPI  Odalys Nathan is a 59 year old female with a history of recurrent stage IIIC bilateral ovarian cancer (last received Gemzar on 8/29/2019), status post endoscopic retrograde cholangiopancreatogram with 4 mm hurricane balloon dilation, gallbladder stent and bile duct stent placements, biliary sphincterotomy (08/23/19), further history of PE, and DVT who presents to the Emergency Department today for evaluation of fatigue. The patient was at the infusion center today where she received IV fluids, potassium, and magnesium.  Following this, the patient was reported to be very fatigued and sleeping most of the day.  The patient's daughter stated that the patient did feel very warm but they were not able to check a temperature as they could not find the thermometer.  They decided to bring the patient to the ED for concern of fatigue and possible fever.  Here, the patient has a temperature of 98.4 in triage and 97.4 in the room.  The patient's  also reports that the patient has lost 25 pounds in the last 3 weeks.  She is not currently on any TPN.  She also reports that she has not had a bowel movement in 3 days and notes that this is very uncommon for her.  She states that she typically has diarrhea every single day.  Patient reports that she has been having headaches every day for the past 3 to 4 weeks that she describes to be in the right forehead.  She states that this has been every day and does improve after taking Advil.  The patient does report that she has pain in her right lower quadrant of her abdomen and notes that this is consistent with her chronic abdominal pain.  She otherwise reports having some mild shortness of breath and a mild cough.  She denies any nausea or vomiting today and reports that her last episode of vomiting was on Saturday, 2 days ago.  She denies any numbness, tingling, chest pain, or back pain. She  reports the history of PE is remote and she is not currently on anticoagulation.  reports that it was related to a prior surgery many years ago.  also reports the patient has been more confused over the past 3 days and sleeping much mor often. No focal weakness noted. No recent trauma or falls. They have an upcoming appointment with palliative care.      I have reviewed the Medications, Allergies, Past Medical and Surgical History, and Social History in the [employer] system.    Past Medical History:   Diagnosis Date     Antiplatelet or antithrombotic long-term use      Ascites      Blood clot in the legs      Diabetes (H)      Ovarian cancer (H)     serous,stg IV     Pleural effusion      Pulmonary embolism (H) 2/2012     Refusal of blood transfusions as patient is [employer]      Short gut syndrome      Subclinical hypothyroidism 4/18/2013     Thrombosis of leg      Past Surgical History:   Procedure Laterality Date     COLECTOMY       COLONOSCOPY  2/1/2012    Procedure:COLONOSCOPY; With Biopsy; Surgeon:TONI SULLIVAN; Location:[facility] OR     ENDOSCOPIC RETROGRADE CHOLANGIOPANCREATOGRAM N/A 8/23/2019    Procedure: Endoscopic Retrograde Cholangiopancreatogram with 4 mm Hurricane Balloon Dilation, gallbladder stent and Bile Duct stent placements, Bilarry Sphincterotomy ;  Surgeon: Catrachito Lloyd MD;  Location: [facility] OR     HYSTERECTOMY TOTAL ABD, RHIANNA SALPINGO-OOPHORECTOMY, NODE DISSECTION, TUMOR DEBULKING, COMBINED  2/1/2012    Procedure:COMBINED HYSTERECTOMY TOTAL ABDOMINAL, BILATERAL SALPINGO-OOPHORECTOMY, NODE DISSECTION, TUMOR DEBULKING;  Exploratory Laparotomy, Total Abdominal Hysterectomy, Bilateral Salpingo-Oophorectomy, appendectomy,lysis of adhesions, ileal, ascending, transverse and splenic flexure resection, ileal descending bowel renanastomosis, incidental cystotomy repair, CUSA procedure and colonoscopy ; [facility]     INSERT PORT PERITONEAL ACCESS  4/3/2012    Procedure:INSERT PORT PERITONEAL  ACCESS; Intraperitoneal Port Placement (c-arm); Surgeon:SAMUEL CARRASCO; Location:[facility]     INSERT PORT PERITONEAL ACCESS  2014    Procedure: INSERT PORT PERITONEAL ACCESS;  Surgeon: Nga Yeung MD;  Location: [facility] OR     INSERT PORT VASCULAR ACCESS       LAPAROSCOPY DIAGNOSTIC (GYN)  2014    Procedure: LAPAROSCOPY DIAGNOSTIC (GYN);  Surgeon: Nga Yeung MD;  Location: [facility]     LAPAROTOMY EXPLORATORY Right 2015    Procedure: LAPAROTOMY EXPLORATORY;  Surgeon: Nga Yeung MD;  Location: [facility]     LAPAROTOMY, TUMOR DEBULKING, COMBINED  2014    Procedure: COMBINED LAPAROTOMY, TUMOR DEBULKING;  Surgeon: Nga Yeung MD;  Location: [facility]     REMOVE CATHETER PERITONEAL N/A 2014    Procedure: REMOVE CATHETER PERITONEAL;  Surgeon: Nga Yeung MD;  Location: [facility] OR     VASCULAR SURGERY      stent left iliac vein     Family History   Problem Relation Age of Onset     Cancer Mother 69        lung, smoker     Cancer Maternal Uncle 65        brain     Colon Cancer Maternal Aunt 80        colon     Social History     Tobacco Use     Smoking status: Former Smoker     Years: 8.00     Types: Cigarettes     Last attempt to quit: 1980     Years since quittin.2     Smokeless tobacco: Never Used     Tobacco comment: started at 11 yo and quit at 16 yo   Substance Use Topics     Alcohol use: Yes     Comment: 3x/day wine or jodi     No current facility-administered medications for this encounter.      Current Outpatient Medications   Medication     albuterol (PROAIR HFA/PROVENTIL HFA/VENTOLIN HFA) 108 (90 Base) MCG/ACT inhaler     cefTRIAXone (ROCEPHIN) 2 GM vial     dexamethasone (DECADRON) 4 MG tablet     oxyCODONE (ROXICODONE) 5 MG tablet     pantoprazole (PROTONIX) 40 MG EC tablet     Ascorbic Acid (VITAMIN C PO)     Calcium Carbonate-Vitamin D (CALCIUM + D PO)     clotrimazole 10 MG shane     cyanocobalamin (VITAMIN B12) 1000 MCG/ML injection      diphenoxylate-atropine (LOMOTIL) 2.5-0.025 MG per tablet     dronabinol (MARINOL) 2.5 MG capsule     Ferrous Sulfate 324 (65 Fe) MG TBEC     HEMP OIL OR EXTRACT OR OTHER CBD CANNABINOID, NOT MEDICAL CANNABIS,     HERBALS     iohexol (OMNIPAQUE) 140 MG/ML solution for oral use     LEVOTHYROXINE SODIUM PO     loperamide (IMODIUM) 2 MG capsule     LORazepam (ATIVAN) 1 MG tablet     magnesium oxide (MAG-OX) 400 MG tablet     order for DME     potassium chloride (KLOR-CON) 20 MEQ packet     prochlorperazine (COMPAZINE) 10 MG tablet     VITAMIN E NATURAL PO      No Known Allergies     Review of Systems  10 point review of systems was obtained and is otherwise negative unless stated in the HPI above.   Physical Exam   BP: 94/49  Pulse: 111  Heart Rate: 130  Temp: 98.4  F (36.9  C)  Resp: 22  Weight: 58.6 kg (129 lb 1.6 oz)  SpO2: 94 %    Physical Exam   Constitutional: She is oriented to person, place, and time. She appears well-developed and well-nourished. No distress.   Chronically ill appearing   HENT:   Head: Normocephalic and atraumatic.   Mouth/Throat: Oropharynx is clear and moist. No oropharyngeal exudate.   Eyes: Pupils are equal, round, and reactive to light. EOM are normal. No scleral icterus.   Neck: Normal range of motion. Neck supple. No JVD present. No tracheal deviation present.   Cardiovascular: Regular rhythm, normal heart sounds and intact distal pulses.   No murmur heard.  Tachycardic   Pulmonary/Chest: Effort normal and breath sounds normal. No respiratory distress. She has no wheezes. She has no rales.   Abdominal: Soft. Bowel sounds are normal. She exhibits no distension and no mass. There is tenderness (Mild RLQ and RUQ tenderness. Negative betts sign). There is no rebound and no guarding.   Musculoskeletal: Normal range of motion. She exhibits no edema or tenderness.   Neurological: She is alert and oriented to person, place, and time. She has normal strength. She displays no tremor. No cranial  nerve deficit or sensory deficit. She exhibits normal muscle tone. GCS eye subscore is 4. GCS verbal subscore is 5. GCS motor subscore is 6.   Speech is normal.  5 out of 5 strength in all 4 extremities bilaterally including  strength.  Sensation intact light touch in all 4 extremities bilaterally.  Finger-to-nose intact bilaterally.  Visual fields intact.   Skin: Skin is warm and dry. Capillary refill takes less than 2 seconds. No rash noted.   Psychiatric: She has a normal mood and affect.   Vitals reviewed.      ED Course   9:40 PM  The patient was seen and examined by Dr. Arreola in Room 22.    ED Course as of Sep 15 0212   Mon Sep 09, 2019   2257 Patient only received 300 ml of fluids at this time and discussed with nurse to pressure bag in the rest of these fluids as last blood pressure 94/58. Will order a second liter. Lactic acid normal at this time.       2339 Second liter of fluid not bolused due to pleural effusion and noted sodium of 128. Maintenance fluid ordered.       Tue Sep 10, 2019   0119 GI fellow recommends direct bilirubin, continue antibiotic coverage for possible cholangitis and the team will evaluate her in the morning. If patient were to decompensate further prior to the morning, the would want to be contacted.       0206 Neurosurgery recommended 4 mg of IV decadron every 6 hours with protonix with that to prevent stress ulcer as well as MRI of the brain with and without contrast. Did not feel the patient warranted ICU admission from their stand point.         Procedures             EKG Interpretation:      Interpreted by Patricia Arreola MD  Time reviewed: 10:30 pm  Symptoms at time of EKG: shortness of breath   Rhythm: normal sinus   Rate: normal  Axis: normal  Ectopy: none  Conduction: normal  ST Segments/ T Waves: No ST-T wave changes  Q Waves: none  Comparison to prior: Unchanged from 12/28/16    Clinical Impression: normal EKG          Critical Care time:  none       Labs  Ordered and Resulted from Time of ED Arrival Up to the Time of Departure from the ED   LIPASE - Abnormal; Notable for the following components:       Result Value    Lipase 52 (*)     All other components within normal limits   CBC WITH PLATELETS DIFFERENTIAL - Abnormal; Notable for the following components:    RBC Count 2.99 (*)     Hemoglobin 9.7 (*)     Hematocrit 29.4 (*)     Platelet Count 104 (*)     Absolute Lymphocytes 0.2 (*)     All other components within normal limits   COMPREHENSIVE METABOLIC PANEL - Abnormal; Notable for the following components:    Sodium 128 (*)     Potassium 2.8 (*)     Glucose 144 (*)     Calcium 7.7 (*)     Bilirubin Total 2.0 (*)     Albumin 2.0 (*)     Protein Total 6.1 (*)     Alkaline Phosphatase 738 (*)     ALT 67 (*)     All other components within normal limits   INR - Abnormal; Notable for the following components:    INR 1.23 (*)     All other components within normal limits   PROCALCITONIN - Abnormal; Notable for the following components:    Procalcitonin 5.98 (*)     All other components within normal limits   PHOSPHORUS - Abnormal; Notable for the following components:    Phosphorus 1.6 (*)     All other components within normal limits   BILIRUBIN DIRECT - Abnormal; Notable for the following components:    Bilirubin Direct 1.4 (*)     All other components within normal limits   ISTAT  GASES LACTATE AMBROCIO POCT - Abnormal; Notable for the following components:    Ph Venous 7.45 (*)     PCO2 Venous 28 (*)     Bicarbonate Venous 20 (*)     All other components within normal limits   LACTIC ACID WHOLE BLOOD   PARTIAL THROMBOPLASTIN TIME   MAGNESIUM   PULSE OXIMETRY NURSING   CARDIAC CONTINUOUS MONITORING   ISTAT CG4 GASES LACTATE AMBROCIO NURSING POCT     CT Chest Pulmonary Embolism w Contrast   Final Result   Abnormal   IMPRESSION:    1. No evidence of acute pulmonary embolism.   2. New small-to-moderate right and trace left pleural effusions with   associated atelectasis.   3.  Progression of metastatic disease in the chest, including worsening   mediastinal, hilar, and supraclavicular adenopathy. Increased pleural   thickening at the right apex also concerning for developing pleural   carcinomatosis, possibly contributing to the pleural effusions.       [Urgent Result: Worsening metastatic disease.]      Finding was identified on 9/9/2019 11:07 PM.       Dr. Arreola was contacted by Dr. Monsivais at 9/9/2019 11:23 PM and   verbalized understanding of the urgent finding.        I have personally reviewed the examination and initial interpretation   and I agree with the findings.      SHANNEN CANTOR MD      CT Abdomen Pelvis w Contrast   Final Result   Abnormal   IMPRESSION: In this patient with a history of ovarian cancer, findings   are concerning for progression of disease since 7/11/2019:   1. Larger mass in inferior segment 6 in the liver, and increased   infiltrative tissue in the edmundo hepatis which extends along the right   portal portal branches and bile ducts, and inferiorly to the duodenum,   which is potentially now involved by tumor as there is new focal wall   edema and hypoenhancement at this location, versus postprocedural   edema.   1a. The tumoral progression is in keeping with rising CA-125 levels.   2. Increased mass effect on the extrahepatic portal vein, with   unchanged cavernous transformation and multiple collaterals.   3. New biliary stents. Mildly increased intrahepatic biliary ductal   dilatation. Increased pneumobilia, nonspecific given stents.   Cholangitis is not excluded given potentially worsening obstruction.   4. New moderate right hydronephrosis and hydroureter. The right ureter   appears potentially tethered to new enhancing tissue associated with   the right ovarian vein concerning for focal worsening metastatic   disease.   5. Mildly worsened retroperitoneal and mesenteric adenopathy.   6. New small volume ascites.   7. New mildly prominent loops of  small bowel with thickened walls in   the lower central abdomen. No transition point to suggest obstruction.   Findings may be secondary to venous congestion given portal findings   and worsening ascites, or possibly enteritis.       [Urgent Result: Worsening metastatic disease.]      Finding was identified on 9/9/2019 11:07 PM.       Dr. Arreola was contacted by Dr. Monsivais at 9/9/2019 11:23 PM and   verbalized understanding of the urgent finding.      I have personally reviewed the examination and initial interpretation   and I agree with the findings.      SHANNEN CANTOR MD      Head CT w/o contrast   Final Result   Abnormal   Impression:    1. New 4.7 x 5.3 x 4.1 cm mass lesion in the right anterior frontal   lobe with surrounding vasogenic edema, most suggestive of intracranial   metastatic disease, new since 8/3/2016.   2. Mass effect on the anterior right cerebrum with effacement of the   right anterior horn, slight subfalcine herniation, and 4 mm   right-to-left midline shift.      [Urgent Result: New intracranial mass.]      Finding was identified on 9/9/2019 11:07 PM.       Dr. Arreola was contacted by Dr. Monsivais at 9/9/2019 11:23 PM and   verbalized understanding of the urgent finding.        I have personally reviewed the examination and initial interpretation   and I agree with the findings.      JESSICA HYLTON MD               Assessments & Plan (with Medical Decision Making)   On exam, patient is initially tachycardic at 130.  Her blood pressures are in the upper 90s systolic.  This does appear to be near her baseline.  She does not have any evidence of respiratory distress and is satting appropriately.  She is afebrile here.  Differential diagnosis for her symptoms is broad and includes but is not limited to intracranial mass, infectious process such as cholangitis, UTI, pneumonia, worsening metastatic disease, PE, cardiac etiology.  EKG revealed sinus tachycardia without evidence of acute  ischemia.  2 blood cultures were ordered including one from her port.  Broad laboratory studies were obtained.  Her lactic acid was within normal limits.  CBC revealed normal white blood cell count of 5.9.  Hemoglobin was stable at 9.7.  CMP revealed hyponatremia with a sodium of 128 from 130 earlier today.  She had already received 1 L of fluids earlier today during her infusion therapy.  We had started 1 L of normal saline and had ordered a second with her continued somewhat soft blood pressures however with a sodium of 128 we did not want to overcorrect her sodium too quickly and thus she was started on maintenance fluid after the initial 1 L given to her here.  Her blood pressures remained stable and improved.  Her bilirubin had increased to 2 from 1 approximately 5 days earlier.  Her alk phos and AST and ALT were near her previous baseline. We did order a CT of the thorax with contrast, CT of the abdomen and pelvis as she does have some abdominal tenderness on exam.  Urinalysis was unremarkable.  She is denying any chest pain and with her EKG without any evidence of acute ischemia we felt that acute coronary syndrome would be unlikely.      We also obtained a CT of the head with these chronic headaches and the 's report of some progressive confusion.  Here she does not have any focal neurologic deficit.  I was contacted by the radiologist due to a new mass present on the CT scan.  This mass was 4.7 x 5.3 x 4.1 cm in the right anterior frontal lobe with surrounding vasogenic edema.  This was concerning for metastasis.  There was some mass-effect on the anterior right cerebrum with effacement of the right anterior horn and slight subfalcine herniation with 4 mm right to left midline shift.  I again reassessed the patient and she did not have any focal neurologic deficits and was awake and alert with a GCS of 15.  I immediately contacted the neurosurgery team who came to evaluate the patient.  I also  contacted the gynecology oncology team who came to evaluate the patient.  CT of the abdomen and pelvis revealed tumor progression with mildly increased intrahepatic biliary ductal dilatation with increased pneumobilia.  This was concerning for possible cholangitis with the increase in her bilirubin as well as her abdominal pain.  Patient's white blood cell count and lactic acid were within normal limits.  However her procalcitonin was elevated at 5 and thus we did give the patient a dose of IV Zosyn to cover for possible cholangitis.  GI was consulted and will evaluate her during her hospitalization as well.  Blood cultures are pending prior to antibiotics.  CT of the thorax did not reveal any evidence of PE.  This did reveal small to moderate right and trace left pleural effusions with associated atelectasis.  There was also progression of metastatic disease in the chest.  These were continued reasons for some judicious fluid administration along with her hyponatremia.  The gynecology oncology team was in agreement with the ongoing plan.  GI was in agreement with the IV Zosyn and requested a direct bilirubin test which was added on.  Neurosurgery recommended MRI of the brain with and without contrast and giving the patient 4 mg of dexamethasone every 6 hours.  Patient was given her first dose here.  They also recommended giving this with Protonix and she was given her first dose of Protonix here as well.  I discussed the results with the patient and her family and they were in agreement with the plan.  The gynecology oncology team will admit the patient.  Neurosurgery at this point does not plan emergent intervention.  She again remained without any focal neurologic deficits.  Her potassium was also low at 2.8 and that she was given 10 mEq of potassium chloride IV as well.  The neurosurgery team did not feel the patient warranted ICU placement from their standpoint.  Gynecology oncology was comfortable with her  being transferred to the floor.  Patient was transferred in stable condition.    I have reviewed the nursing notes.    I have reviewed the findings, diagnosis, plan and need for follow up with the patient.    Final diagnoses:   Brain metastasis (H)   Malignant neoplasm of ovary, unspecified laterality (H)   Ovarian cancer, right (H)   Ovarian cancer, left (H)   Chronic pain due to neoplasm   Cholangitis   Metastatic cancer (H)   I, Ricardo Durant, am serving as a trained medical scribe to document services personally performed by Patricia Arreola MD, based on the provider's statements to me.   IPatricia MD, was physically present and have reviewed and verified the accuracy of this note documented by Ricardo Durant.     9/9/2019   [facility], [place], EMERGENCY DEPARTMENT    Patricia Arreola MD  09/15/19 0227

## 2019-09-10 NOTE — ED TRIAGE NOTES
Pt arrived via car with her  with c/o increased fatigue. Per  pt has had a loss of appetite over the past few weeks. Pt has ovarian cancer and received IV fluids this morning for potassium and magnesium replacement plus normal saline. Per pt's  pt has periods of confusion for the past few days and today was worse. Pt denies pain at this time.  reports an 8 lb weight loss over the past few weeks.

## 2019-09-10 NOTE — CONSULTS
[facility]       NEUROSURGERY CONSULTATION NOTE    This consultation was requested by Dr. Arreola from the ED service.    Reason for Consultation: brain mass    HPI:  Ms. Nathan is a 58 yo F with PMH metastatic ovarian cancer on chemotherapy, [facility], DVT/PE not on anticoagulation presenting with several weeks of headache, weight loss, and a few days of confusion and malaise, found to have new R frontal brain tumor with vasogenic edema on CT head. Patient also dealing with multiple other medical issues including hyponatremia to 128, possible cholangitis, etc. Has vomiting intermittently with chemotherapy, but nothing more than baseline.    No recent weakness, LOC, numbness/weakness/paresthesias in extremities, changes in sensation, taste, smell, nor trouble speaking or other neurologic symptoms.    PAST MEDICAL HISTORY:   Past Medical History:   Diagnosis Date     Antiplatelet or antithrombotic long-term use      Ascites      Blood clot in the legs      Diabetes (H)      Ovarian cancer (H)     serous,stg IV     Pleural effusion      Pulmonary embolism (H) 2/2012     Refusal of blood transfusions as patient is [employer]      Short gut syndrome      Subclinical hypothyroidism 4/18/2013     Thrombosis of leg        PAST SURGICAL HISTORY:   Past Surgical History:   Procedure Laterality Date     COLECTOMY       COLONOSCOPY  2/1/2012    Procedure:COLONOSCOPY; With Biopsy; Surgeon:TONI SULLIVAN; [facility]     ENDOSCOPIC RETROGRADE CHOLANGIOPANCREATOGRAM N/A 8/23/2019    Procedure: Endoscopic Retrograde Cholangiopancreatogram with 4 mm Hurricane Balloon Dilation, gallbladder stent and Bile Duct stent placements, Bilarry Sphincterotomy ;  Surgeon: Catrachito Lloyd MD;  Location: [facility] OR     HYSTERECTOMY TOTAL ABD, RHIANNA SALPINGO-OOPHORECTOMY, NODE DISSECTION, TUMOR DEBULKING, COMBINED  2/1/2012    Procedure:COMBINED HYSTERECTOMY TOTAL ABDOMINAL, BILATERAL  SALPINGO-OOPHORECTOMY, NODE DISSECTION, TUMOR DEBULKING;  Exploratory Laparotomy, Total Abdominal Hysterectomy, Bilateral Salpingo-Oophorectomy, appendectomy,lysis of adhesions, ileal, ascending, transverse and splenic flexure resection, ileal descending bowel renanastomosis, incidental cystotomy repair, CUSA procedure and colonoscopy ; [facility]     INSERT PORT PERITONEAL ACCESS  4/3/2012    Procedure:INSERT PORT PERITONEAL ACCESS; Intraperitoneal Port Placement (c-arm); Surgeon:SAMUEL CARRASCO; Location[facility]     INSERT PORT PERITONEAL ACCESS  2014    Procedure: INSERT PORT PERITONEAL ACCESS;  Surgeon: Nga Yeung MD;  Location: [facility] OR     INSERT PORT VASCULAR ACCESS       LAPAROSCOPY DIAGNOSTIC (GYN)  2014    Procedure: LAPAROSCOPY DIAGNOSTIC (GYN);  Surgeon: Nga Yeung MD;  [facility]: [facility]     LAPAROTOMY EXPLORATORY Right 2015    Procedure: LAPAROTOMY EXPLORATORY;  Surgeon: Nga Yeung MD;  [facility]     LAPAROTOMY, TUMOR DEBULKING, COMBINED  2014    Procedure: COMBINED LAPAROTOMY, TUMOR DEBULKING;  Surgeon: Nga Yeung MD;  [facility]     REMOVE CATHETER PERITONEAL N/A 2014    Procedure: REMOVE CATHETER PERITONEAL;  Surgeon: Nga Yeung MD;  [facility]: [facility]     VASCULAR SURGERY      stent left iliac vein       FAMILY HISTORY:   Family History   Problem Relation Age of Onset     Cancer Mother 69        lung, smoker     Cancer Maternal Uncle 65        brain     Colon Cancer Maternal Aunt 80        colon       SOCIAL HISTORY:   Social History     Tobacco Use     Smoking status: Former Smoker     Years: 8.00     Types: Cigarettes     Last attempt to quit: 1980     Years since quittin.2     Smokeless tobacco: Never Used     Tobacco comment: started at 9 yo and quit at 18 yo   Substance Use Topics     Alcohol use: Yes     Comment: 3x/day wine or jodi   Lives in [place], not working.    MEDICATIONS:  Current Outpatient  Medications   Medication Sig Dispense Refill     albuterol (PROAIR HFA/PROVENTIL HFA/VENTOLIN HFA) 108 (90 Base) MCG/ACT inhaler Inhale 2 puffs into the lungs every 6 hours as needed for shortness of breath / dyspnea or wheezing 1 Inhaler 1     Ascorbic Acid (VITAMIN C PO) Take 500 mg by mouth daily        Calcium Carbonate-Vitamin D (CALCIUM + D PO) Take 1 tablet by mouth daily.       clotrimazole 10 MG rell Take 1 Rell (10 mg) by mouth 5 times daily for 14 days 70 Rell 0     cyanocobalamin (VITAMIN B12) 1000 MCG/ML injection Inject 1 mL (1,000 mcg) into the muscle every 30 days 1 mL 11     diphenoxylate-atropine (LOMOTIL) 2.5-0.025 MG per tablet Take 2 tablets by mouth 4 times daily as needed for diarrhea 60 tablet 0     dronabinol (MARINOL) 2.5 MG capsule Take 1 capsule (2.5 mg) by mouth 2 times daily (before meals) 60 capsule 3     Ferrous Sulfate 324 (65 Fe) MG TBEC TAKE ONE TABLET BY MOUTH THREE TIMES DAILY WITH MEALS. TAKE WITH A SMALL AMOUNT OF ORANGE JUICE. DO NOT TAKE WITH CALCIUM 90 tablet 11     HEMP OIL OR EXTRACT OR OTHER CBD CANNABINOID, NOT MEDICAL CANNABIS,        HERBALS daily        iohexol (OMNIPAQUE) 140 MG/ML solution for oral use Mix entire bottle (50ml) of contast with 600ml (20 ounces) of water and drink half 2 hrs prior to CT scan and half 1 hr prior to scan (Patient not taking: Reported on 9/5/2019) 140 mL 0     LEVOTHYROXINE SODIUM PO Take 50 mcg by mouth daily        loperamide (IMODIUM) 2 MG capsule Take 2 mg by mouth 4 times daily as needed for diarrhea       LORazepam (ATIVAN) 1 MG tablet Take 1 tablet (1 mg) by mouth every 6 hours as needed (Anxiety, Nausea/Vomiting or Sleep) 30 tablet 2     magnesium oxide (MAG-OX) 400 MG tablet Take 1 tablet (400 mg) by mouth 2 times daily 90 tablet 3     nystatin (MYCOSTATIN) 187163 UNIT/ML suspension Take 5 mLs (500,000 Units) by mouth 4 times daily for 14 days 473 mL 0     omeprazole (PRILOSEC) 20 MG DR capsule TAKE ONE CAPSULE BY MOUTH  ONE TIME DAILY  30 capsule 3     order for DME Injection Supplies for Vitamin B12: 3cc syringes w/ 27 gauge needles, 1/2 inch length 3 each 0     oxyCODONE (ROXICODONE) 5 MG tablet Take 1 tablet (5 mg) by mouth every 6 hours as needed for severe pain 60 tablet 0     potassium chloride (KLOR-CON) 20 MEQ packet Take 20 mEq by mouth daily       prochlorperazine (COMPAZINE) 10 MG tablet Take 1 tablet (10 mg) by mouth every 6 hours as needed for nausea or vomiting 30 tablet 1     VITAMIN E NATURAL PO Take 100 Units by mouth daily         Allergies:  No Known Allergies    ROS: 10 point ROS of systems including Constitutional, Eyes, Respiratory, Cardiovascular, Gastroenterology, Genitourinary, Integumentary, Muscularskeletal, Psychiatric were all negative except for pertinent positives noted in my HPI.    Physical exam:   Blood pressure 94/54, pulse 103, temperature 97.4  F (36.3  C), temperature source Oral, resp. rate 22, SpO2 97 %, not currently breastfeeding.  CV: tachycardic  PULM: breathing comfortably on room air  NEUROLOGIC:  -- Awake; Alert; oriented x 3  -- Follows commands briskly  -- +repetition, calculation, and naming  -- Speech fluent, spontaneous. No aphasia or dysarthria.  -- no gaze preference. No apparent hemineglect.  Cranial Nerves:  -- visual fields full to confrontation, PERRL 3-2mm bilat and brisk, extraocular movements intact  -- face symmetrical, tongue midline  -- sensory V1-V3 intact bilaterally  -- palate elevates symmetrically, uvula midline  -- hearing grossly intact bilat  -- Trapezii 5/5 strength bilat symmetric  -- Cerebellar: Finger nose finger without dysmetria    Motor:  Normal bulk / tone; no tremor, rigidity, or bradykinesia.  No muscle wasting or fasciculations  No Pronator Drift     Delt Bi Tri Hand Flexion/  Extension Iliopsoas Quadriceps Hamstrings Tibialis Anterior Gastroc    C5 C6 C7 C8/T1 L2 L3 L4-S1 L4 S1   R 5 5 5 5 5 5 5 4 5   L 5 5 5 5 5 5 5 5 5   Sensory:  intact to LT x 4  extremities     Reflexes:     Bi Tri BR Uday Pat Ach Bab    C5-6 C7-8 C6 UMN L2-4 S1 UMN   R 2+ 2+ 2+ Norm 2+ 2+ Norm   L 2+ 2+ 2+ Norm 2+ 2+ Norm     Gait: deferred      IMAGING:  CT head 9/9/19:  1. New 4.7 x 5.3 x 4.1 cm mass lesion in the right anterior frontal  lobe with surrounding vasogenic edema, most suggestive of intracranial  metastatic disease, new since 8/3/2016.  2. Mass effect on the anterior right cerebrum with effacement of the  right anterior horn, slight subfalcine herniation, and 4 mm  right-to-left midline shift.     LABS:   Na 128    ASSESSMENT:  Ms. Nathan is a 60 yo F with PMH metastatic ovarian cancer on chemotherapy, [facility], DVT/PE not on anticoagulation presenting with several weeks of headache, weight loss, and a few days of confusion and malaise, found to have new R frontal brain tumor with vasogenic edema on CT head, possibly metastasis.    RECOMMENDATIONS:  - MRI brain with and without contrast  - Decadron 4 mg q6 hrs  - Avoid hyponatremia. Maintain normonatremia to mildly hypernatremic.  - Other cares per primary team.  - Discussion regarding possible surgery pending MRI brain    The patient was discussed with Dr. Wise, neurosurgery chief resident, and he agrees with the above.    Jonathon Morocho MD  Neurosurgery Resident PGY2

## 2019-09-10 NOTE — PLAN OF CARE
Pt arrived from ED/ MRI @ 4444. A x O, VSS on Ra. Pain managed w/ oxycodone 5mg. Potassium replaced in the Ed. Redraw ordered for the Am. Denies nausea. Regular diet, taking sips of water. Due to void. UA needed. Commode at the bed.  at bedside.

## 2019-09-10 NOTE — CONSULTS
GASTROENTEROLOGY CONSULTATION      Date of Admission:  9/9/2019           Reason for Consultation:   We were asked by Dr. Aranda of Gyn/Onc to evaluate this patient with Possible cholangitis           ASSESSMENT AND RECOMMENDATIONS:   Assessment:  60 yo F with ovarian CA on chemo with liver mets s/p ERCP 8/23 for chronic cholecystitis s/p trancystic GB stent and L intrahepatic stent for stenosis 2/2 tumor infiltration (unable to get into R intrahepatic), presenting for fatigue. GI consulted for slightly worsening bilirubin up to 2 with concern for cholangitis, otherwise ALK phos improving. Tachy on presentation improved with fluids, BPs soft at baseline. Afebrile, normal WBCs. CT with GB and bile duct stents still in place and significant progression of ovarian CA (including new brain met). Started on Zosyn. Suspect presentation not likely consistent with cholangitis and likely this is all related to cancer progression. At this point we will defer endoscopic treatment until goals of care are determine.     Recommendations  --Continue to trend LFTs  --We will hold on ERCP for now until readdressing goals of care with palliative care consult with the intent that if she is candidate for more chemo we might consider further biliary drainage.  --We will await neurosurgery approach in terms of her new brain mets  --Agree with antibiotics  --Pain management per primary team   --GI will continue to follow   Gastroenterology outpatient follow up recommendations: TBD    Thank you for involving us in this patient's care. Please do not hesitate to contact the GI service with any questions or concerns.     Pt care plan discussed with Dr. Lloyd, GI staff physician.    Vasile Spicer MD  GI Fellow  [phone]  -------------------------------------------------------------------------------------------------------------------           History of Present Illness:   Odalys Nathan is a 60 yo F with ovarian CA on chemo  with liver mets s/p ERCP 8/23 for chronic cholecystitis s/p trancystic GB stent and L intrahepatic stent for stenosis 2/2 tumor infiltration (unable to get into R intrahepatic), presenting for fatigue. Reports also having some subjective fevers at home with abdominal pain more in the lower quadrants. Patient denies any signs of overt bleeding. GI consulted for slightly worsening bili up to 2 with concern for cholangitis, otherwise ALK phos improving.    In the ED Tachy on presentation improved with fluids, BPs soft at baseline. Afebrile, normal WBCs. CT with GB and bile duct stents still in place and significant progression of ovarian CA (including new brain met). Started on Zosyn.            Past Medical History:   Reviewed and edited as appropriate  Past Medical History:   Diagnosis Date     Antiplatelet or antithrombotic long-term use      Ascites      Blood clot in the legs      Diabetes (H)      Ovarian cancer (H)     serous,stg IV     Pleural effusion      Pulmonary embolism (H) 2/2012     Refusal of blood transfusions as patient is [employer]      Short gut syndrome      Subclinical hypothyroidism 4/18/2013     Thrombosis of leg             Past Surgical History:   Reviewed and edited as appropriate   Past Surgical History:   Procedure Laterality Date     COLECTOMY       COLONOSCOPY  2/1/2012    Procedure:COLONOSCOPY; With Biopsy; Surgeon:TONI SULLIVAN; [facility]     ENDOSCOPIC RETROGRADE CHOLANGIOPANCREATOGRAM N/A 8/23/2019    Procedure: Endoscopic Retrograde Cholangiopancreatogram with 4 mm Hurricane Balloon Dilation, gallbladder stent and Bile Duct stent placements, Bilarry Sphincterotomy ;  Surgeon: Catrachito Lloyd MD;  Location: [facility] OR     HYSTERECTOMY TOTAL ABD, RHIANNA SALPINGO-OOPHORECTOMY, NODE DISSECTION, TUMOR DEBULKING, COMBINED  2/1/2012    Procedure:COMBINED HYSTERECTOMY TOTAL ABDOMINAL, BILATERAL SALPINGO-OOPHORECTOMY, NODE DISSECTION, TUMOR DEBULKING;  Exploratory Laparotomy, Total  Abdominal Hysterectomy, Bilateral Salpingo-Oophorectomy, appendectomy,lysis of adhesions, ileal, ascending, transverse and splenic flexure resection, ileal descending bowel renanastomosis, incidental cystotomy repair, CUSA procedure and colonoscopy ; [facility]     INSERT PORT PERITONEAL ACCESS  4/3/2012    Procedure:INSERT PORT PERITONEAL ACCESS; Intraperitoneal Port Placement (c-arm); Surgeon:SAMUEL CARRASCO; [facility]     INSERT PORT PERITONEAL ACCESS  5/14/2014    Procedure: INSERT PORT PERITONEAL ACCESS;  Surgeon: Nga Yeung MD;  Location: [facility]     INSERT PORT VASCULAR ACCESS       LAPAROSCOPY DIAGNOSTIC (GYN)  5/14/2014    Procedure: LAPAROSCOPY DIAGNOSTIC (GYN);  Surgeon: Nga Yeung MD;  [facility]: [facility]     LAPAROTOMY EXPLORATORY Right 11/25/2015    Procedure: LAPAROTOMY EXPLORATORY;  Surgeon: Nga Yeung MD;  [facility]     LAPAROTOMY, TUMOR DEBULKING, COMBINED  5/14/2014    Procedure: COMBINED LAPAROTOMY, TUMOR DEBULKING;  Surgeon: Nga Yeung MD;  [facility]     REMOVE CATHETER PERITONEAL N/A 8/20/2014    Procedure: REMOVE CATHETER PERITONEAL;  Surgeon: Nga Yeung MD;  [facility]     VASCULAR SURGERY      stent left iliac vein            Previous Endoscopy:   No results found for this or any previous visit.         Social History:   Reviewed and edited as appropriate  Social History     Socioeconomic History     Marital status:      Spouse name: Not on file     Number of children: Not on file     Years of education: Not on file     Highest education level: Not on file   Occupational History     Not on file   Social Needs     Financial resource strain: Not on file     Food insecurity:     Worry: Not on file     Inability: Not on file     Transportation needs:     Medical: Not on file     Non-medical: Not on file   Tobacco Use     Smoking status: Former Smoker     Years: 8.00     Types: Cigarettes     Last attempt to quit: 6/21/1980      Years since quittin.2     Smokeless tobacco: Never Used     Tobacco comment: started at 11 yo and quit at 18 yo   Substance and Sexual Activity     Alcohol use: Yes     Comment: 3x/day wine or jodi     Drug use: No     Sexual activity: Not on file   Lifestyle     Physical activity:     Days per week: Not on file     Minutes per session: Not on file     Stress: Not on file   Relationships     Social connections:     Talks on phone: Not on file     Gets together: Not on file     Attends Cheondo[employer] service: Not on file     Active member of club or organization: Not on file     Attends meetings of clubs or organizations: Not on file     Relationship status: Not on file     Intimate partner violence:     Fear of current or ex partner: Not on file     Emotionally abused: Not on file     Physically abused: Not on file     Forced sexual activity: Not on file   Other Topics Concern     Parent/sibling w/ CABG, MI or angioplasty before 65F 55M? Not Asked   Social History Narrative     Not on file            Family History:   Reviewed and edited as appropriate  Family History   Problem Relation Age of Onset     Cancer Mother 69        lung, smoker     Cancer Maternal Uncle 65        brain     Colon Cancer Maternal Aunt 80        colon    No known history of colorectal cancer, liver disease, or inflammatory bowel disease.         Allergies:   Reviewed and edited as appropriate   No Known Allergies         Medications:     Current Facility-Administered Medications   Medication     albuterol (PROAIR HFA/PROVENTIL HFA/VENTOLIN HFA) 108 (90 Base) MCG/ACT inhaler 2 puff     dexamethasone PF (DECADRON) injection 4 mg     docusate sodium (COLACE) capsule 100 mg     HYDROmorphone (DILAUDID) injection 0.2 mg     levothyroxine (SYNTHROID/LEVOTHROID) tablet 50 mcg     lidocaine (LMX4) cream     lidocaine 1 % 0.1-1 mL     magnesium sulfate 4 g in 100 mL sterile water (premade)     melatonin tablet 1 mg     naloxone (NARCAN)  injection 0.1-0.4 mg     ondansetron (ZOFRAN-ODT) ODT tab 4 mg    Or     ondansetron (ZOFRAN) injection 4 mg     oxyCODONE (ROXICODONE) tablet 5-10 mg     [START ON 9/11/2019] pantoprazole (PROTONIX) 40 mg IV push injection     piperacillin-tazobactam (ZOSYN) 3.375 g vial to attach to  mL bag     potassium chloride (KLOR-CON) Packet 20-40 mEq     potassium chloride 10 mEq in 100 mL intermittent infusion with 10 mg lidocaine     potassium chloride 10 mEq in 100 mL sterile water intermittent infusion (premix)     potassium chloride 20 mEq in 50 mL intermittent infusion     potassium chloride ER (K-DUR/KLOR-CON M) CR tablet 20-40 mEq     potassium phosphate 15 mmol in D5W 250 mL intermittent infusion     potassium phosphate 20 mmol in D5W 250 mL intermittent infusion     potassium phosphate 20 mmol in D5W 500 mL intermittent infusion     potassium phosphate 25 mmol in D5W 500 mL intermittent infusion     prochlorperazine (COMPAZINE) injection 10 mg    Or     prochlorperazine (COMPAZINE) tablet 10 mg    Or     prochlorperazine (COMPAZINE) Suppository 25 mg     sodium chloride (PF) 0.9% PF flush 3 mL     sodium chloride (PF) 0.9% PF flush 3 mL     sodium chloride 0.9% infusion     Facility-Administered Medications Ordered in Other Encounters   Medication     heparin 100 UNIT/ML injection 500 Units             Review of Systems:     A complete 10 point review of systems was performed and is negative except as noted in the HPI           Physical Exam:   BP 90/49 (BP Location: Right arm)   Pulse 103   Temp 97.5  F (36.4  C) (Oral)   Resp 18   Wt 58.6 kg (129 lb 1.6 oz)   SpO2 95%   BMI 21.48 kg/m    Wt:   Wt Readings from Last 2 Encounters:   09/10/19 58.6 kg (129 lb 1.6 oz)   09/05/19 54.6 kg (120 lb 6.4 oz)      Constitutional: cooperative, pleasant, not dyspneic/diaphoretic, no acute distress  Eyes: Sclera anicteric/injected  Ears/nose/mouth/throat: Normal oropharynx without ulcers or exudate, mucus membranes  moist, hearing intact  Neck: supple, thyroid normal size  CV: No edema  Respiratory: Unlabored breathing  Lymph: No axillary, submandibular, supraclavicular or inguinal lymphadenopathy  Abd:  Nondistended, +bs, nontender, no peritoneal signs  Skin: warm, perfused, no jaundice  Neuro: AAO x 3, No asterixis  Psych: Flat affect           Data:   Labs and imaging below were independently reviewed and interpreted    BMP  Recent Labs   Lab 09/10/19  0632 09/09/19  2218 09/09/19  0825 09/05/19  1230    128* 130* 133   POTASSIUM 3.0* 2.8* 3.1* 3.4   CHLORIDE 101 95 95 98   JOSE ALBERTO 7.7* 7.7* 8.4* 9.2   CO2 22 22 27 26   BUN 7 7 7 12   CR 0.53 0.56 0.58 0.66   * 144* 101* 132*     CBC  Recent Labs   Lab 09/10/19  0632 09/09/19  2218 09/05/19  1230   WBC 4.1 5.9 7.2   RBC 2.99* 2.99* 3.84   HGB 9.8* 9.7* 12.6   HCT 29.5* 29.4* 38.5   MCV 99 98 100   MCH 32.8 32.4 32.8   MCHC 33.2 33.0 32.7   RDW 14.6 14.6 14.4   PLT 86* 104* 316     INR  Recent Labs   Lab 09/09/19 2218   INR 1.23*     LFTs  Recent Labs   Lab 09/10/19  0632 09/09/19 2218 09/09/19 0825 09/05/19  1230   ALKPHOS 676* 738* 816* 997*   AST 38 44 68* 47*   ALT 60* 67* 80* 62*   BILITOTAL 2.0* 2.0* 2.1* 1.0   PROTTOTAL 6.1* 6.1* 6.4* 7.2   ALBUMIN 2.0* 2.0* 2.3* 2.8*      PANC  Recent Labs   Lab 09/09/19 2218   LIPASE 52*       Imaging:  CT A/P 9/9/19  IMPRESSION: In this patient with a history of ovarian cancer, findings  are concerning for progression of disease since 7/11/2019:  1. Larger mass in inferior segment 6 in the liver, and increased  infiltrative tissue in the edmundo hepatis which extends along the right  portal portal branches and bile ducts, and inferiorly to the duodenum,  which is potentially now involved by tumor as there is new focal wall  edema and hypoenhancement at this location, versus postprocedural  edema.  1a. The tumoral progression is in keeping with rising CA-125 levels.  2. Increased mass effect on the extrahepatic portal  vein, with  unchanged cavernous transformation and multiple collaterals.  3. New biliary stents. Mildly increased intrahepatic biliary ductal  dilatation. Increased pneumobilia, nonspecific given stents.  Cholangitis is not excluded given potentially worsening obstruction.  4. New moderate right hydronephrosis and hydroureter. The right ureter  appears potentially tethered to new enhancing tissue associated with  the right ovarian vein concerning for focal worsening metastatic  disease.  5. Mildly worsened retroperitoneal and mesenteric adenopathy.  6. New small volume ascites.  7. New mildly prominent loops of small bowel with thickened walls in  the lower central abdomen. No transition point to suggest obstruction.  Findings may be secondary to venous congestion given portal findings  and worsening ascites, or possibly enteritis.

## 2019-09-10 NOTE — PLAN OF CARE
Status: Admitted to 7C from ED for increased fatigue and periods of confusion. Hx ovarian cancer and chemotherapy.  Vitals: Soft BP's, MD paged and bolus started.  Neuros: A&O x4, forgetful. 5/5 strengths. RLE dorsiflexion moderate, pt reports this at baseline.   IV: Port infusing NS bolus. Continuous  mL/hr.  Resp/trach: No SOB, room air.  Diet: Regular diet.  Bowel status: BS+, no BM this shift.  : Voiding spontaneously, good output this shift.   Skin: Skin's intact.  Pain: Pain controlled with 5 mg oxycodone PRN.  Activity: Ax1/GB. Pt ambulated to bathroom x1.  Social:  at the bedside during the day. Daughter currently at the bedside.  Plan: Potassium replaced per MD orders. Several consults completed today. Continue to monitor and follow POC.

## 2019-09-10 NOTE — PROVIDER NOTIFICATION
MD notified of critical result for gram (-) bacilli (9/9) and port a cath growing gram (-) rods (9/10). Also notified for preliminary result for port a cath is klebsiella pneumonia.

## 2019-09-10 NOTE — CONSULTS
Urology Consult    Name: Odalys Nathan    MRN: [id number]   YOB: 1960               Chief Complaint:   Hydronephrosis    History is obtained from the patient and chart review          History of Present Illness:   Odalys Nathan is a 59 year old female with recurrent metastatic ovarian cancer who we were consulted for new right moderate hydronephrosis. Patient was admitted to the hospital after presenting with a several days of headache and fatigue. During her work up she was found to have worsening liver mets, lymph nodes and new brain metastasis.  Her creatinine has remained at baseline of 0.53. She denies any recent history of hematuria, flank pain (does note some midline lower back pain), or urinary frequency, urgency or retention. She has had some worsening dyspnea over the last several days, generalized fatigue, constipation and headaches but no chest pain, leg swelling or leg pain.           Past Medical History:     Past Medical History:   Diagnosis Date     Antiplatelet or antithrombotic long-term use      Ascites      Blood clot in the legs      Diabetes (H)      Ovarian cancer (H)     serous,stg IV     Pleural effusion      Pulmonary embolism (H) 2/2012     Refusal of blood transfusions as patient is Muslim      Short gut syndrome      Subclinical hypothyroidism 4/18/2013     Thrombosis of leg             Past Surgical History:     Past Surgical History:   Procedure Laterality Date     COLECTOMY       COLONOSCOPY  2/1/2012    Procedure:COLONOSCOPY; With Biopsy; Surgeon:TONI SULLIVAN; Location:[facility]     ENDOSCOPIC RETROGRADE CHOLANGIOPANCREATOGRAM N/A 8/23/2019    Procedure: Endoscopic Retrograde Cholangiopancreatogram with 4 mm Hurricane Balloon Dilation, gallbladder stent and Bile Duct stent placements, Bilarry Sphincterotomy ;  Surgeon: Catrachito Lloyd MD;  Location: [facility]     HYSTERECTOMY TOTAL ABD, RHIANNA SALPINGO-OOPHORECTOMY, NODE DISSECTION, TUMOR DEBULKING,  COMBINED  2012    Procedure:COMBINED HYSTERECTOMY TOTAL ABDOMINAL, BILATERAL SALPINGO-OOPHORECTOMY, NODE DISSECTION, TUMOR DEBULKING;  Exploratory Laparotomy, Total Abdominal Hysterectomy, Bilateral Salpingo-Oophorectomy, appendectomy,lysis of adhesions, ileal, ascending, transverse and splenic flexure resection, ileal descending bowel renanastomosis, incidental cystotomy repair, CUSA procedure and colonoscopy ; [facility]     INSERT PORT PERITONEAL ACCESS  4/3/2012    Procedure:INSERT PORT PERITONEAL ACCESS; Intraperitoneal Port Placement (c-arm); Surgeon:SAMUEL CARRASCO; Location:[facility]     INSERT PORT PERITONEAL ACCESS  2014    Procedure: INSERT PORT PERITONEAL ACCESS;  Surgeon: Nga Yeung MD;  Location: [facility]     INSERT PORT VASCULAR ACCESS       LAPAROSCOPY DIAGNOSTIC (GYN)  2014    Procedure: LAPAROSCOPY DIAGNOSTIC (GYN);  Surgeon: Nga Yeung MD;  Location: [facility]     LAPAROTOMY EXPLORATORY Right 2015    Procedure: LAPAROTOMY EXPLORATORY;  Surgeon: Nga Yeung MD;  Location: [facility]     LAPAROTOMY, TUMOR DEBULKING, COMBINED  2014    Procedure: COMBINED LAPAROTOMY, TUMOR DEBULKING;  Surgeon: Nga Yeung MD;  Location: [facility]     REMOVE CATHETER PERITONEAL N/A 2014    Procedure: REMOVE CATHETER PERITONEAL;  Surgeon: Nga Yeung MD;  Location: [facility]     VASCULAR SURGERY      stent left iliac vein            Social History:     Social History     Tobacco Use     Smoking status: Former Smoker     Years: 8.00     Types: Cigarettes     Last attempt to quit: 1980     Years since quittin.2     Smokeless tobacco: Never Used     Tobacco comment: started at 11 yo and quit at 16 yo   Substance Use Topics     Alcohol use: Yes     Comment: 3x/day wine or jodi            Family History:     Family History   Problem Relation Age of Onset     Cancer Mother 69        lung, smoker     Cancer Maternal Uncle 65        brain     Colon Cancer Maternal  Aunt 80        colon            Allergies:   No Known Allergies         Medications:     Current Facility-Administered Medications   Medication     albuterol (PROAIR HFA/PROVENTIL HFA/VENTOLIN HFA) 108 (90 Base) MCG/ACT inhaler 2 puff     dexamethasone PF (DECADRON) injection 4 mg     docusate sodium (COLACE) capsule 100 mg     HYDROmorphone (DILAUDID) injection 0.2 mg     levothyroxine (SYNTHROID/LEVOTHROID) tablet 50 mcg     lidocaine (LMX4) cream     lidocaine 1 % 0.1-1 mL     magnesium sulfate 4 g in 100 mL sterile water (premade)     melatonin tablet 1 mg     naloxone (NARCAN) injection 0.1-0.4 mg     ondansetron (ZOFRAN-ODT) ODT tab 4 mg    Or     ondansetron (ZOFRAN) injection 4 mg     oxyCODONE (ROXICODONE) tablet 5-10 mg     [START ON 9/11/2019] pantoprazole (PROTONIX) 40 mg IV push injection     piperacillin-tazobactam (ZOSYN) 3.375 g vial to attach to  mL bag     potassium chloride (KLOR-CON) Packet 20-40 mEq     potassium chloride 10 mEq in 100 mL intermittent infusion with 10 mg lidocaine     potassium chloride 10 mEq in 100 mL sterile water intermittent infusion (premix)     potassium chloride 20 mEq in 50 mL intermittent infusion     potassium chloride ER (K-DUR/KLOR-CON M) CR tablet 20-40 mEq     potassium phosphate 15 mmol in D5W 250 mL intermittent infusion     potassium phosphate 20 mmol in D5W 250 mL intermittent infusion     potassium phosphate 20 mmol in D5W 500 mL intermittent infusion     potassium phosphate 25 mmol in D5W 500 mL intermittent infusion     prochlorperazine (COMPAZINE) injection 10 mg    Or     prochlorperazine (COMPAZINE) tablet 10 mg    Or     prochlorperazine (COMPAZINE) Suppository 25 mg     sodium chloride (PF) 0.9% PF flush 3 mL     sodium chloride (PF) 0.9% PF flush 3 mL     sodium chloride 0.9% infusion     Facility-Administered Medications Ordered in Other Encounters   Medication     heparin 100 UNIT/ML injection 500 Units             Review of Systems:    ROS:  10 point ROS neg other than the symptoms noted above in the HPI           Physical Exam:   VS:  T: 97.5    HR: 103    BP: 90/49    RR: 18   GEN:  AOx3.  NAD.    CV:  RRR  LUNGS: Non-labored breathing.   BACK:  No midline or CVA tenderness bilaterally. Mild paraspinous tenderness to palpation.  ABD:  Soft.  NT.  ND.  No rebound or guarding.  No masses. Multiple surgical scars (3x transverse right abdominal scars, midline laparotomy scar), all well-healed.  SKIN:  Warm.  Dry.  No rashes.  NEURO:  CN grossly intact.            Data:   All laboratory data reviewed:    Recent Labs   Lab 09/10/19  0632 09/09/19 2218 09/05/19  1230   WBC 4.1 5.9 7.2   HGB 9.8* 9.7* 12.6   PLT 86* 104* 316     Recent Labs   Lab 09/10/19  0632 09/09/19 2218 09/09/19  0825 09/05/19  1230    128* 130* 133   POTASSIUM 3.0* 2.8* 3.1* 3.4   CHLORIDE 101 95 95 98   CO2 22 22 27 26   BUN 7 7 7 12   CR 0.53 0.56 0.58 0.66   * 144* 101* 132*   JOSE ALBERTO 7.7* 7.7* 8.4* 9.2   MAG  --  1.6 1.1* 1.3*   PHOS  --  1.6*  --   --      Recent Labs   Lab 09/10/19  0830   COLOR Yellow   APPEARANCE Clear   URINEGLC Negative   URINEBILI Negative   URINEKETONE 10*   SG 1.012   URINEPH 6.0   PROTEIN Negative   NITRITE Negative   LEUKEST Negative   RBCU 0   WBCU <1     All pertinent imaging reviewed:    CT scan of the abdomen:   IMPRESSION: In this patient with a history of ovarian cancer, findings  are concerning for progression of disease since 7/11/2019:  1. Larger mass in inferior segment 6 in the liver, and increased  infiltrative tissue in the edmundo hepatis which extends along the right  portal portal branches and bile ducts, and inferiorly to the duodenum,  which is potentially now involved by tumor as there is new focal wall  edema and hypoenhancement at this location, versus postprocedural  edema.  1a. The tumoral progression is in keeping with rising CA-125 levels.  2. Increased mass effect on the extrahepatic portal vein, with  unchanged  cavernous transformation and multiple collaterals.  3. New biliary stents. Mildly increased intrahepatic biliary ductal  dilatation. Increased pneumobilia, nonspecific given stents.  Cholangitis is not excluded given potentially worsening obstruction.  4. New moderate right hydronephrosis and hydroureter. The right ureter  appears potentially tethered to new enhancing tissue associated with  the right ovarian vein concerning for focal worsening metastatic  disease.  5. Mildly worsened retroperitoneal and mesenteric adenopathy.  6. New small volume ascites.  7. New mildly prominent loops of small bowel with thickened walls in  the lower central abdomen. No transition point to suggest obstruction.  Findings may be secondary to venous congestion given portal findings  and worsening ascites, or possibly enteritis.           Impression and Plan:   Impression:   Odalys Nathan is a 59 year old female with progressing metastatic ovarian cancer and new right hydronephrosis secondary to malignant obstruction. At this time patient is asymptomatic and her creatinine is baseline. We discussed the indication for urinary diversion including infection, pain, worsening kidney function or potential treatment options that may warrant intervention in order to protect renal function. We also dicussed options of urinary drainage including percutaneous nephrostomy tube vs ureteral stent placement. At this time, patient does not meet the need for emergent intervention and  is coping with her new prognosis and is unable to make the decision of intervention without weighing oncology and palliative care input. If patient does elect for intervention if she develops the aforementioned symptoms or elects for nephrotoxic chemotherapy, will plan for either stent vs percutaneous nephrostomy tube, favoring PNT as this is the most successful in cases of malignant obstruction.       Plan:     - no acute surgical intervention indicated    - if  patient experiences flank pain and rising creatinine may consider stent placement or PNT placement.     - consider patient goals and thorough discussion    - Please contact urology resident/PA on call with any questions or concerns.         This patient's exam findings, labs, and imaging discussed with urology staff surgeon Dr. Gan, who developed the treatment plan.    Mikie Jeffries MD  Urology Resident

## 2019-09-10 NOTE — PROGRESS NOTES
Neurosurgery Progress Note    S:  Denies headache this morning.       O:  BP 90/49 (BP Location: Right arm)   Pulse 103   Temp 97.5  F (36.4  C) (Oral)   Resp 18   Wt 58.6 kg (129 lb 1.6 oz)   SpO2 95%   BMI 21.48 kg/m    Exam:  General: Awake;  Alert, In No Acute Distress  Pulm: Breathing Comfortably on room air  Mental status: Oriented x 2  Cranial Nerves: Cranial Nerves II-XII Intact Bilaterally  Strength:      Del Tr Bi WE WF Gr  R 5 5 5 5 5 5  L 5 5 5 5 5 5     HF KE KF DF PF   R 5 5 5 4 5   L 5 5 5 5 5     Pronator Drift: Absent  Sensory: Intact to Light Touch  Reflexes: No Hyperreflexia, Mendes s or Clonus Present; Toes Down-Going Bilaterally    Assessment:   Ms. Nathan is a 60 yo F with PMH metastatic ovarian cancer on chemotherapy, Restorationist, DVT/PE not on anticoagulation presenting with several weeks of headache, weight loss, and a few days of confusion and malaise, found to have new R frontal brain tumor with vasogenic edema on CT head, possibly metastasis.    Recommendations:     - MRI brain with and without contrast  - Decadron 4 mg q6 hrs  - Avoid hyponatremia.   - Other cares per primary team.  - Discussion regarding possible surgery pending MRI brain.          HAILE Lee, CNP  Department of Neurosurgery  Pager: [phone]

## 2019-09-10 NOTE — PROGRESS NOTES
"CLINICAL NUTRITION SERVICES - ASSESSMENT NOTE     Nutrition Prescription    RECOMMENDATIONS FOR MDs/PROVIDERS TO ORDER:  Consider ordering a multivitamin/mineral (Thera-vit-M) to help support pt's micronutrient needs when PO is poor    Malnutrition Status:    Severe malnutrition in the context of acute on chronic illness    Recommendations already ordered by Registered Dietitian (RD):  -PRN supplement/snack order    Future/Additional Recommendations:  -Monitor PO adequacy and need for kcal cts  -Monitor GOC     REASON FOR ASSESSMENT  Odalys Nathan is a/an 59 year old female assessed by the dietitian for Admission Nutrition Risk Screen for unintentional loss of 10# or more in the past two months    Per H&P: \"recurrent ovarian cancer who presents with worsening fatigue, abdominal pain, and several week history of headache, shortness of breath, and weight loss. She is currently receiving Gemzar (C2D8 9/5) with the plan to receive 3 cycles and repeat imaging. She recently had an ERCP and stent placed on 8/23 due to worsening disease causing chronic cholecystitis and biliary stricture. Since that time, she says her pain initially improved, but then started increasing. It is in the RLQ and exacerbated with movement. The pain has been keeping her from eating. She has had nausea, but no appetite. She has lost ~25lbs per her 's report. She has not had a BM in 3 days, she usually has loose stools requiring anti-diarrheals. She denies fevers, but has chills. She also reports 3-4 weeks of intermittent headaches that respond to Ibuprofen. She denies any vision changes, focal weakness or numbness. She also has shortness of breath that has worsened over the same time period (3-4 weeks). She denies dizziness, chest pain, or palpitations. She is tired and slept most of the day prior to presenting to the ED. She denies any dysuria, frequency, or urgency.\"     NUTRITION HISTORY  Pt reports decreased PO largely related to " "nausea/feeling sick and poor appetite after new chemotherapy started about a month ago. Also a few weeks ago was not eating well d/t poor appetite/pain in setting of cholecystitis with worsening disease (pain still present). Over past month or more pt thinks she is eating a few bites of one meal daily on average and has noticed wt loss. Worst day in terms of nausea is the Saturday after chemo.  Pt has been trying to avoid sugar d/t hearing that sugar feeds cancer, but recently has been eating whatever just to get calories. Pt shares she has TMJ, with some clicking and chews on one side of mouth usually, but denies this inhibits her ability to take adequate PO. Avoids no specific foods. Pt walks her dog regularly (main exercise), and has noticed having less strength with ongoing wt loss.    CURRENT NUTRITION ORDERS  Diet: Regular  Intake/Tolerance: Pt reports food sounds good and relayed dinner order to RD. RD ordered dinner for pt and supplement to trial per pt preference.     LABS  Labs reviewed  -Na 136 (WNL)  -K 3.0 (L)  -Ketones urine 10 --> Indicative of recent poor PO    MEDICATIONS  Medications reviewed  -Protonix  -Synthroid  -Colace    ANTHROPOMETRICS  Height: 0 cm (Data Unavailable)  Ht Readings from Last 2 Encounters:   08/29/19 1.651 m (5' 5\")   08/23/19 1.651 m (5' 5\")   Most Recent Weight: 58.6 kg (129 lb 1.6 oz)    IBW: 56.8 kg   BMI: 21.48 kg/m2; Normal BMI  Weight History: Wt has downtrended ~25 lbs (17%) over past year per chart review (145 lbs --> 120 lbs). Corroborated by pt report.  Wt Readings from Last 40 Encounters:   09/10/19 58.6 kg (129 lb 1.6 oz)   09/05/19 54.6 kg (120 lb 6.4 oz)   08/29/19 57.3 kg (126 lb 4 oz)   08/23/19 58 kg (127 lb 13.9 oz)   08/15/19 60.6 kg (133 lb 8 oz)   08/08/19 59.9 kg (132 lb)   07/15/19 61.3 kg (135 lb 2 oz)   07/11/19 61.5 kg (135 lb 8 oz)   07/03/19 61.6 kg (135 lb 14.4 oz)   06/27/19 62 kg (136 lb 9.6 oz)   06/20/19 62 kg (136 lb 9.6 oz)   06/13/19 63 kg " (138 lb 12.8 oz)   06/06/19 62.2 kg (137 lb 3.2 oz)   05/30/19 62.6 kg (138 lb)   05/23/19 64.5 kg (142 lb 1.6 oz)   05/16/19 63 kg (138 lb 12.8 oz)   05/07/19 64 kg (141 lb)   04/23/19 64.6 kg (142 lb 8 oz)   04/17/19 63.5 kg (140 lb 1.6 oz)   04/11/19 63.5 kg (140 lb)   04/04/19 64.1 kg (141 lb 4.8 oz)   03/28/19 63.5 kg (139 lb 14.4 oz)   03/20/19 63.2 kg (139 lb 6.4 oz)   03/14/19 64.9 kg (143 lb)   12/20/18 64.9 kg (143 lb)   12/13/18 66.4 kg (146 lb 4.8 oz)   12/06/18 66 kg (145 lb 8 oz)   11/29/18 65.9 kg (145 lb 4.8 oz)   11/23/18 63.5 kg (140 lb)   11/15/18 65.5 kg (144 lb 4.8 oz)   11/08/18 65.3 kg (144 lb)   10/23/18 64.7 kg (142 lb 9.6 oz)   10/17/18 64.5 kg (142 lb 4.8 oz)   10/11/18 65.4 kg (144 lb 3.2 oz)   10/04/18 64.8 kg (142 lb 14.4 oz)   09/27/18 64.6 kg (142 lb 6.4 oz)   09/20/18 64.9 kg (143 lb)   09/06/18 64.9 kg (143 lb 1.6 oz)   08/30/18 64.5 kg (142 lb 4.8 oz)   08/22/18 64 kg (141 lb 1.5 oz)     Dosing Weight: 55 kg (actual, based on PTA suspected drier wt of 54.6 kg on 9/5/19)    ASSESSED NUTRITION NEEDS  Estimated Energy Needs: 6386-7334 kcals/day (25 - 30+ kcals/kg)  Justification: Maintenance  Estimated Protein Needs: 66-83 grams protein/day (1.2 - 1.5 grams of pro/kg)  Justification: Hypercatabolism with acute illness/cancer diagnosis  Estimated Fluid Needs: 1 mL/kcal  Justification: Maintenance or Per provider pending fluid status    PHYSICAL FINDINGS  See malnutrition section below.    MALNUTRITION  % Intake: </= 50% for >/= 5 days (severe)  % Weight Loss: Up to 20% in 1 year (non-severe)  Subcutaneous Fat Loss: Facial region, Upper arm and Lower arm: Moderate-Severe  Muscle Loss: Temporal, Thoracic region (clavicle, acromium bone), Upper arm (bicep, tricep), Lower arm  (forearm):  Moderate, Dorsal hand:  Moderate, Upper leg (quadricep, hamstring), Patellar region and Posterior calf:  Mild  Fluid Accumulation/Edema: None noted - pt denies any LE edema and none appreciated during  physical exam  Malnutrition Diagnosis: Severe malnutrition in the context of acute on chronic illness    NUTRITION DIAGNOSIS  Inadequate oral intake related to side effects (decreased appetite, nausea) from chemotherapy and abdominal pain from recent cholecystitis/biliary stricture s/p stent as evidenced by pt report of eating bites of 1 meal daily over past month or so, 25 lb (17%) wt loss over past year, and signs of moderate-severe subcutaneous adipose tissue loss and mild-moderate muscle tissue loss upon physical exam.     INTERVENTIONS  Implementation  -Nutrition Education: Provided education on RD role, how to order from room service, prioritizing protein foods and eating 5-6 small meals daily to help improve PO. Provided snack and supplement list and encouraged pt to try items that may appeal (pt trying Gelatein 20 today). Provided education that there is no need to avoid sugar-containing foods as research has not shown it to proliferate cancer cells more than other macronutrients. Provided [email or web address] web site for reputable up-to-date oncology nutrition information as pt was interested in further information. Pt and pt daughter receptive and thankful for information. Pt declined having kcal/protein goals being shared d/t feeling this would be too overwhelming/easy to obsess over.   -Medical food supplement therapy: PRN supplements/snacks    Goals  Patient to consume % of nutritionally adequate meal trays TID, or the equivalent with supplements/snacks.     Monitoring/Evaluation  Progress toward goals will be monitored and evaluated per protocol.    Gloria Burnett RD, LD  Pager: [phone]

## 2019-09-10 NOTE — CONSULTS
[facility]  Palliative Care Consultation Note    Patient: Odalys Nathan  Date of Admission:  9/9/2019    Requesting Clinician / Team: Gyn Onc  Reason for consult: Pain management, Goals of care    Recommendations:    Start bowel regimen with Senna 2 tabs BID and miralax daily.    Encouraged po intake and mobility.    Can consider medication for appetite if no improvement after clearing constipation. Options would include Reglan or Remeron.     Pain regimen currently working well- no recommendations to change at this time.    Currently Full code. Discussions ongoing.    These recommendations have been discussed with Gyn Onc fellow, Dr. Sanchez.      Thank you for the opportunity to participate in the care of this patient and family. Our team: will continue to follow.     During regular M-F work hours -- if you are not sure who specifically to contact -- please contact us by sending a text page to our team consult pager at [phone].    After regular work hours and on weekends/holidays, you can call our answering service at [phone]. Also, who's on call for us is available in [facility] Smart Web.     Patient discussed with Dr. Victoria Gilliam.    Mary Lou Richter DO  Palliative Medicine Fellow    Assessments:  Odalys Nathan is a 59 year old female with recurrent ovarian cancer (on Gemzar) admitted for worsening fatigue, abdominal pain, SOB, and increased weight loss. Additionally, she has had worsening headache over the last week. Imaging in the ED revealed worsening metastatic disease, as well as biliary findings concerning for cholangitis and new R hydronephrosis and hydroureter. Head imaging also revealed a new mass, likely metastatic disease in right frontal lobe.     Today, the patient was seen for:  Ovarian cancer  Brain mass, likely metastatic  Poor Appetite  Abdominal pain  Goals of Care discussion    Prognosis, Goals, & Planning:      Functional Status just  "prior to hospitalization: 1 (Restricted in physically strenuous activity but ambulatory and able to carry out work of a light or sedentary nature)      Prognosis, Goals, and/or Advance Care Planning were addressed today: Yes        Summary/Comments: Visited with Odalys and her daughter Rekha after Neurosurgery had returned. Odalys currently says that she would like to have surgery to remove the tumor in her brain, however, as a Sikhism, she is firm in her stance of not receiving blood transfusions. She is hoping that a middle ground can be reached that would make surgery a better option from her and her doctors' perspectives. She is fully aware of the risk of death \"I know I could croak on the table, and I'm OK with that. I have to croak sometime\". When asked about the possibility of being disabled and how losing the ability to do things she loves would impact her quality of life, she said \"That's a good question. I need to think about that more\".       Patient's decision making preferences: not assessed          Patient has decision-making capacity today for complex decisions: Yes            I have concerns about the patient/family's health literacy today: No           Patient has a completed Health Care Directive: Yes, and on file.      Code status: full code    Coping, Meaning, & Spirituality:   Mood, coping, and/or meaning in the context of serious illness were addressed today: No    Social:     Living situation: Lives with her .      Enjoys cooking and cleaning.    History of Present Illness:  History gathered today from: patient, medical chart  Originally diagnosed with ovarian cancer in 2012, underwent multiple cycles of several types of chemo. Monitoring  done from late 2018 into 2019. Underwent ERCP with stent placement Aug 2019 2/2 worsening disease causing chronic cholecystitis and biliary stricture and had 2nd cycle of gemzar 8/29/19. Plan has been for 3rd cycle then repeat " "imaging.     After ERCP, pain initially improved but has since been increasing. Located in RUQ and worsened with movement. Had been taking about 15mg of oxycodone IR daily for pain. Increased nausea and decreased appetite with reported ~25lb weight loss. Appetite mainly affected by early satiety but has had periods where food just \"didn't sound good\". Previously had issues with diarrhea but has had more significant constipation the past few months. Has a bowel movement maybe every 3-5 days-does not take any bowel meds at home. Additionally had 3-4 weeks of headaches that were responsive to NSAIDs, no vision changes or focal Neurologic changes. Has been more tired and sleeping a lot of the day.    Presented to the ED 9/9 with worsening symptoms. Imaging revealed worsening metastatic disease as well as new brain mass, likely metastatic.     GI consulted-Likely not cholangitis. Recommended outpt follow up.  Urology consulted- No surgical recs now. If becomes symptomatic, could consider PNT.  Neurosurgery consulted- Considering surgery but possibility of complications is worrisome.     Key Palliative Symptom Data:  # Pain severity the last 12 hours: low  # Nausea severity the last 12 hours: low    Patient is on opioids: assessed and I made recommendations about bowel care as above.    ROS:  Comprehensive ROS is reviewed and is negative except as per HPI.     Past Medical History:  Past Medical History:   Diagnosis Date     Antiplatelet or antithrombotic long-term use      Ascites      Blood clot in the legs      Diabetes (H)      Ovarian cancer (H)     serous,stg IV     Pleural effusion      Pulmonary embolism (H) 2/2012     Refusal of blood transfusions as patient is Restorationist      Short gut syndrome      Subclinical hypothyroidism 4/18/2013     Thrombosis of leg      Past Surgical History:  Past Surgical History:   Procedure Laterality Date     COLECTOMY       COLONOSCOPY  2/1/2012    Procedure:COLONOSCOPY; " With Biopsy; Surgeon:TONI SULLIVAN; Location:[facility] OR     ENDOSCOPIC RETROGRADE CHOLANGIOPANCREATOGRAM N/A 8/23/2019    Procedure: Endoscopic Retrograde Cholangiopancreatogram with 4 mm Hurricane Balloon Dilation, gallbladder stent and Bile Duct stent placements, Bilarry Sphincterotomy ;  Surgeon: Catrachito Lloyd MD;  Location: [facility] OR     HYSTERECTOMY TOTAL ABD, RHIANNA SALPINGO-OOPHORECTOMY, NODE DISSECTION, TUMOR DEBULKING, COMBINED  2/1/2012    Procedure:COMBINED HYSTERECTOMY TOTAL ABDOMINAL, BILATERAL SALPINGO-OOPHORECTOMY, NODE DISSECTION, TUMOR DEBULKING;  Exploratory Laparotomy, Total Abdominal Hysterectomy, Bilateral Salpingo-Oophorectomy, appendectomy,lysis of adhesions, ileal, ascending, transverse and splenic flexure resection, ileal descending bowel renanastomosis, incidental cystotomy repair, CUSA procedure and colonoscopy ; [facility]     INSERT PORT PERITONEAL ACCESS  4/3/2012    Procedure:INSERT PORT PERITONEAL ACCESS; Intraperitoneal Port Placement (c-arm); Surgeon:SAMUEL CARRASCO; Location:[facility] OR     INSERT PORT PERITONEAL ACCESS  5/14/2014    Procedure: INSERT PORT PERITONEAL ACCESS;  Surgeon: Nga Yeung MD;  Location: [facility] OR     INSERT PORT VASCULAR ACCESS       LAPAROSCOPY DIAGNOSTIC (GYN)  5/14/2014    Procedure: LAPAROSCOPY DIAGNOSTIC (GYN);  Surgeon: Nga Yeung MD;  Location: [facility] OR     LAPAROTOMY EXPLORATORY Right 11/25/2015    Procedure: LAPAROTOMY EXPLORATORY;  Surgeon: Nga Yeung MD;  Location: [facility] OR     LAPAROTOMY, TUMOR DEBULKING, COMBINED  5/14/2014    Procedure: COMBINED LAPAROTOMY, TUMOR DEBULKING;  Surgeon: Nga Yeung MD;  Location: [facility] OR     REMOVE CATHETER PERITONEAL N/A 8/20/2014    Procedure: REMOVE CATHETER PERITONEAL;  Surgeon: Nga Yeung MD;  Location: [facility] OR     VASCULAR SURGERY      stent left iliac vein      Family History:  Family History   Problem Relation Age of Onset     Cancer Mother 69        lung, smoker     Cancer Maternal  Uncle 65        brain     Colon Cancer Maternal Aunt 80        colon      Allergies:  No Known Allergies     Medications:  I have reviewed this patient's medication profile and medications from this hospitalization.   Noted scheduled meds are:  Decadron IV 4mg Q6H  Colace BID  Protonix 40mg IV daily    Noted PRN meds are:  Dilaudid IV 0.2mg   x1  Melatonin 1mg   Zofran IV or ODT  Oxycodone PO 5-10mg   x1 5mg  Compazine PO, IV, SC     Physical Exam:  Vital Signs: Temp: 97.5  F (36.4  C) Temp src: Oral BP: 90/49 Pulse: 103 Heart Rate: 85 Resp: 18 SpO2: 95 % O2 Device: None (Room air)    Weight: 129 lbs 1.6 oz    Constitutional: alert, no distress and smiling  Head: No abnormalities noted.  EENT: Lids and lashes normal, sclera non-icteric. No abnormalities of ears, nose, or mouth noted.  Cardiovascular: RRR. No murmurs.  Respiratory: Lungs clear to auscultation bilaterally.  Gastrointestinal: Abdomen soft, mostly non-tender except for LLQ. BS normal.   Musculoskeletal: extremities normal- no gross deformities noted and normal muscle tone  Skin: no suspicious lesions or rashes  Neurologic: Appears grossly normal.  Psychiatric: mentation appears normal, affect normal/bright and judgment and insight intact    Data reviewed:  Recent imaging reviewed, my comments on pertinents:   CT Chest PE 9/9/19- Negative for PE. Small pleural effusions. Increased metastatic disease in the chest.     CT Abd/Pelvis 9/9/19- Evidence of tumor progression. Increased size of mass in liver with likely infiltration of duodenum. Increased mass effect on the portal vein. Bile duct stents in place. New right hydronephrosis and hydroureter with evidence of metastatic tissue connected to ureter. Small ascites, new from prior study. Prominent small bowel loops likely secondary to venous congestion vs. ascites vs. Enteritis, no evidence of obstruction. Overall worsening metastatic disease.     MR Brain 9/10/19- Impression:  1. Anterior right frontal  axial mass appears predominantly centrally necrotic with scattered areas of intralesional hemorrhage. Abnormal enhancement primarily in the rim of the tumor consistent with viable tumor. Given the history of ovarian cancer, this is more likely metastatic, although a primary brain tumor could have a similar appearance.  2. Moderate surrounding vasogenic edema. Unchanged mass effect on the  right cerebrum with 4 mm right-to-left midline shift and slight subfalcine herniation.    Recent lab data reviewed, my comments on pertinents:   Cr 0.53, GFR >90  Albumin 2.0, Tot Protein 6.1  ALT 60 (H)  Hgb 9.8  Plt 86      Patient seen and evaluated with Dr. Richter   Agree with assessment and recommendations. Any additions by me are in italics.    Total time spent was 75 minutes,  >50% of time was spent counseling and/or coordination of care regarding disease understanding, treatment plan.    Victoria Gilliam MD  Palliative Medicine  Pager [phone]

## 2019-09-10 NOTE — ED NOTES
[facility], [place]   ED Nurse to Floor Handoff     Odalys Nathan is a 59 year old female who speaks English and lives with a spouse,  in a home  They arrived in the ED by car from home    ED Chief Complaint: Fatigue    ED Dx;   Final diagnoses:   None         Needed?: No    Allergies: No Known Allergies.  Past Medical Hx:   Past Medical History:   Diagnosis Date     Antiplatelet or antithrombotic long-term use      Ascites      Blood clot in the legs      Diabetes (H)      Ovarian cancer (H)     serous,stg IV     Pleural effusion      Pulmonary embolism (H) 2/2012     Refusal of blood transfusions as patient is Denominational      Short gut syndrome      Subclinical hypothyroidism 4/18/2013     Thrombosis of leg       Baseline Mental status: WDL  Current Mental Status changes: other mild confusion intermittently    Infection present or suspected this encounter: cultures pending  Sepsis suspected: No  Isolation type: No active isolations     Activity level - Baseline/Home:  Independent  Activity Level - Current:   Stand with Assist    Bariatric equipment needed?: No    In the ED these meds were given:   Medications   potassium chloride 10 mEq in 100 mL sterile water intermittent infusion (premix) (0 mEq Intravenous Stopped 9/10/19 0105)   0.9% sodium chloride BOLUS (0 mLs Intravenous Stopped 9/9/19 2320)     Followed by   sodium chloride 0.9% infusion ( Intravenous Rate/Dose Verify 9/10/19 0237)   iopamidol (ISOVUE-370) solution 73 mL (73 mLs Intravenous Given 9/9/19 2243)   sodium chloride (PF) 0.9% PF flush 90 mL (90 mLs Intravenous Given 9/9/19 2243)   piperacillin-tazobactam (ZOSYN) 3.375 g vial to attach to  mL bag (0 g Intravenous Stopped 9/10/19 0107)   dexamethasone PF (DECADRON) injection 4 mg (4 mg Intravenous Given 9/10/19 0214)   pantoprazole (PROTONIX) 40 mg IV push injection (40 mg Intravenous Given 9/10/19 0216)       Drips running?  Yes Normal  Saline @ 100mls/hr    Home pump  No    Current LDAs  Port A Cath Single 08/20/14 Right Chest wall (Active)   Access Date 09/09/19 9/9/2019 10:20 PM   Access Attempts 1 9/9/2019 10:20 PM   Gauge/Length 20 gauge;3/4 inch;Power noncoring 90 degree bend 9/9/2019 10:20 PM   Site Assessment WDL 9/9/2019 10:20 PM   Line Status Blood return noted;Infusing 9/9/2019 10:20 PM   Extravasation? No 9/9/2019 10:20 PM   Dressing Intervention Transparent 9/9/2019 10:20 PM   Number of days: 1847       Incision/Surgical Site 08/20/14 Right Abdomen (Active)   Number of days: 1847       Incision/Surgical Site 11/25/15 Right Flank (Active)   Number of days: 1385       Labs results:   Labs Ordered and Resulted from Time of ED Arrival Up to the Time of Departure from the ED   LIPASE - Abnormal; Notable for the following components:       Result Value    Lipase 52 (*)     All other components within normal limits   CBC WITH PLATELETS DIFFERENTIAL - Abnormal; Notable for the following components:    RBC Count 2.99 (*)     Hemoglobin 9.7 (*)     Hematocrit 29.4 (*)     Platelet Count 104 (*)     Absolute Lymphocytes 0.2 (*)     All other components within normal limits   COMPREHENSIVE METABOLIC PANEL - Abnormal; Notable for the following components:    Sodium 128 (*)     Potassium 2.8 (*)     Glucose 144 (*)     Calcium 7.7 (*)     Bilirubin Total 2.0 (*)     Albumin 2.0 (*)     Protein Total 6.1 (*)     Alkaline Phosphatase 738 (*)     ALT 67 (*)     All other components within normal limits   INR - Abnormal; Notable for the following components:    INR 1.23 (*)     All other components within normal limits   PROCALCITONIN - Abnormal; Notable for the following components:    Procalcitonin 5.98 (*)     All other components within normal limits   PHOSPHORUS - Abnormal; Notable for the following components:    Phosphorus 1.6 (*)     All other components within normal limits   BILIRUBIN DIRECT - Abnormal; Notable for the following components:     Bilirubin Direct 1.4 (*)     All other components within normal limits   ISTAT  GASES LACTATE AMBROCIO POCT - Abnormal; Notable for the following components:    Ph Venous 7.45 (*)     PCO2 Venous 28 (*)     Bicarbonate Venous 20 (*)     All other components within normal limits   LACTIC ACID WHOLE BLOOD   PARTIAL THROMBOPLASTIN TIME   MAGNESIUM   ROUTINE UA WITH MICROSCOPIC   PULSE OXIMETRY NURSING   CARDIAC CONTINUOUS MONITORING   ISTAT CG4 GASES LACTATE AMBROCIO NURSING POCT   MEASURE URINE OUTPUT   PATIENT CARE ORDER   IP ASSIGN PROVIDER TEAM TO TREATMENT TEAM   BLOOD CULTURE   BLOOD CULTURE   URINE CULTURE AEROBIC BACTERIAL       Imaging Studies:   Recent Results (from the past 24 hour(s))   Head CT w/o contrast    Narrative    Preliminary report:  This is a preliminary resident interpretation. Full report to follow.         Impression    Impression:   1. New 4.7 x 5.3 x 4.1 cm mass lesion in the right anterior frontal  lobe with surrounding vasogenic edema, most suggestive of intracranial  metastatic disease, new since 8/3/2016.  2. Mass effect on the anterior right cerebrum with effacement of the  right anterior horn, slight subfalcine herniation, and 4 mm  right-to-left midline shift.   CT Abdomen Pelvis w Contrast    Narrative    Preliminary report:  This is a preliminary resident interpretation. Full report to follow.           Impression    IMPRESSION: In this patient with a history of ovarian cancer, findings  are concerning for progression of disease since 7/11/2019:  1. Increased size of the mass in inferior segment 6 in the liver, and  increased infiltrative tissue in the edmundo hepatis which extend along  the portal vein branches and right bile ducts. This tissue appears to  also extend along the common bile duct to the second segment of the  duodenum, which is potentially invaded as there is new focal wall  thickening/edema and hypoenhancement at this location.  2. Increased effacement of the extrahepatic  portal vein secondary to  the worsening edmundo hepatis disease, with unchanged cavernous  transformation and multiple collaterals.  3. New biliary stents in the gallbladder and left lobe of the liver.  Mildly increased intrahepatic biliary ductal dilatation. Increased  pneumobilia, nonspecific given stents. Cholangitis is not excluded  given potentially worsening obstruction.  4. New moderate right hydronephrosis and hydroureter. The right ureter  appears potentially tethered to new enhancing tissue associated with  the right ovarian vein concerning for focal worsening metastatic  disease.  5. Similar to mildly worsened retroperitoneal and mesenteric  adenopathy.  6. New/increased small volume ascites, greatest in the pelvic and left  upper quadrant.  7. New mildly prominent loops of small bowel with mildly thickened  walls in the lower central abdomen, nonspecific. No transition point  to suggest obstruction. Findings may be secondary to venous congestion  given portal findings and worsening ascites, or possibly enteritis.    CT Chest Pulmonary Embolism w Contrast    Narrative    Preliminary report:  This is a preliminary resident interpretation. Full report to follow.         Impression    IMPRESSION:   1. No evidence of acute pulmonary embolism.  2. New small-to-moderate right and trace left pleural effusions with  associated atelectasis.  3. Progression of metastatic disease in the chest, including worsening  mediastinal, hilar, and supraclavicular adenopathy. Increased pleural  thickening at the right apex also concerning for developing pleural  carcinomatosis, possibly contributing to the pleural effusions.        Recent vital signs:   BP 96/59 (BP Location: Left arm)   Pulse 103   Temp 97.4  F (36.3  C) (Oral)   Resp 20   SpO2 97%     Dillon Coma Scale Score: 15 (09/09/19 2140)       Cardiac Rhythm: Normal Sinus  Pt needs tele? No  Skin/wound Issues: None    Code Status: Full Code    Pain control: pt had  none    Nausea control: pt had none    Abnormal labs/tests/findings requiring intervention: Hyponatremic, see lab results, see CT imaging reports - new brain mass, possible metastasis of ovarian cancer    Family present during ED course? Yes   Family Comments/Social Situation comments:  at bedside, Pt is Jehovah's witness and requests only necessary blood draws to conserve blood     Tasks needing completion: None    Bernie Mcdonald, RN  [phone] [facility] ED

## 2019-09-11 NOTE — DISCHARGE SUMMARY
Gynecologic Oncology Discharge Summary    Odalys Nathan  [id number]    Admit Date: 9/9/2019  Discharge Date: 9/14/2019   Admitting Provider: Dr Aranda  Discharge Provider: Dr Aranda    Admission Dx:   - Recurrent ovarian cancer with new brain mass  - Severe malnutrition in the context of acute on chronic illness  - Biliary obstruction, possible cholangitis  - Hypotension  - Bilateral pleural effusions  - Hydronephrosis, hydroureter  -  Hypothyroidism    Discharge Dx:  - Recurrent ovarian cancer with new brain mass  - Severe malnutrition in the context of acute on chronic illness  - Biliary obstruction, possible cholangitis  - Hypotension  - Bilateral pleural effusions  - Hydronephrosis, hydroureter  - Hypothyroidism    Patient Active Problem List   Diagnosis     Partial small bowel obstruction (H)     Ascites     Metastatic cancer (H)     Acute blood loss anemia     Poor nutrition     Elevated liver enzymes     Pulmonary embolism (H)     Diarrhea     Subclinical hypothyroidism     S/P exploratory laparotomy     Drug-induced neutropenia (H)     Hypomagnesemia     Ovarian cancer, right (H)     Ovarian cancer, left (H)     Anemia, unspecified type     Patient in cancer related research study     Encounter for long-term (current) use of medications     Encounter for antineoplastic chemotherapy     Ovarian cancer (H)       Prior to Admission Medications:  Medications Prior to Admission   Medication Sig Dispense Refill Last Dose     albuterol (PROAIR HFA/PROVENTIL HFA/VENTOLIN HFA) 108 (90 Base) MCG/ACT inhaler Inhale 2 puffs into the lungs every 6 hours as needed for shortness of breath / dyspnea or wheezing 1 Inhaler 1 Taking     Ascorbic Acid (VITAMIN C PO) Take 500 mg by mouth daily    Taking     Calcium Carbonate-Vitamin D (CALCIUM + D PO) Take 1 tablet by mouth daily.   Taking     clotrimazole 10 MG rell Take 1 Rell (10 mg) by mouth 5 times daily for 14 days 70 Rell 0      cyanocobalamin (VITAMIN B12)  1000 MCG/ML injection Inject 1 mL (1,000 mcg) into the muscle every 30 days 1 mL 11 Taking     diphenoxylate-atropine (LOMOTIL) 2.5-0.025 MG per tablet Take 2 tablets by mouth 4 times daily as needed for diarrhea 60 tablet 0 Taking     dronabinol (MARINOL) 2.5 MG capsule Take 1 capsule (2.5 mg) by mouth 2 times daily (before meals) 60 capsule 3 Taking     Ferrous Sulfate 324 (65 Fe) MG TBEC TAKE ONE TABLET BY MOUTH THREE TIMES DAILY WITH MEALS. TAKE WITH A SMALL AMOUNT OF ORANGE JUICE. DO NOT TAKE WITH CALCIUM 90 tablet 11 Taking     HEMP OIL OR EXTRACT OR OTHER CBD CANNABINOID, NOT MEDICAL CANNABIS,    Taking     HERBALS daily    Taking     iohexol (OMNIPAQUE) 140 MG/ML solution for oral use Mix entire bottle (50ml) of contast with 600ml (20 ounces) of water and drink half 2 hrs prior to CT scan and half 1 hr prior to scan (Patient not taking: Reported on 2019) 140 mL 0 Not Taking     LEVOTHYROXINE SODIUM PO Take 50 mcg by mouth daily    Taking     loperamide (IMODIUM) 2 MG capsule Take 2 mg by mouth 4 times daily as needed for diarrhea   Taking     LORazepam (ATIVAN) 1 MG tablet Take 1 tablet (1 mg) by mouth every 6 hours as needed (Anxiety, Nausea/Vomiting or Sleep) 30 tablet 2 Taking     magnesium oxide (MAG-OX) 400 MG tablet Take 1 tablet (400 mg) by mouth 2 times daily 90 tablet 3 Taking     nystatin (MYCOSTATIN) 776980 UNIT/ML suspension Take 5 mLs (500,000 Units) by mouth 4 times daily for 14 days 473 mL 0 Taking     omeprazole (PRILOSEC) 20 MG DR capsule TAKE ONE CAPSULE BY MOUTH ONE TIME DAILY  30 capsule 3 Taking     order for DME Injection Supplies for Vitamin B12: 3cc syringes w/ 27 gauge needles, 1/2 inch length 3 each 0 Taking     oxyCODONE (ROXICODONE) 5 MG tablet Take 1 tablet (5 mg) by mouth every 6 hours as needed for severe pain 60 tablet 0 Taking     potassium chloride (KLOR-CON) 20 MEQ packet Take 20 mEq by mouth daily   Taking     [] potassium chloride ER (K-TAB/KLOR-CON) 10 MEQ CR  tablet Take 4 tablets (40 mEq) by mouth once for 1 dose 4 tablet 0      prochlorperazine (COMPAZINE) 10 MG tablet Take 1 tablet (10 mg) by mouth every 6 hours as needed for nausea or vomiting 30 tablet 1 Taking     VITAMIN E NATURAL PO Take 100 Units by mouth daily   Taking       Discharge Medications:     Review of your medicines      START taking      Dose / Directions   cefTRIAXone 2 GM vial  Commonly known as:  ROCEPHIN  Indication:  Bacteria in the Blood  Used for:  Cholangitis      Dose:  2 g  Inject 2 g into the vein every 24 hours for 11 days  Quantity:  220 mL  Refills:  0     dexamethasone 4 MG tablet  Commonly known as:  DECADRON  Used for:  Metastatic cancer (H)      Dose:  4 mg  Take 1 tablet (4 mg) by mouth 2 times daily (with meals) for 5 days  Quantity:  10 tablet  Refills:  0     pantoprazole 40 MG EC tablet  Commonly known as:  PROTONIX  Used for:  Metastatic cancer (H)      Dose:  40 mg  Take 1 tablet (40 mg) by mouth daily  Quantity:  8 tablet  Refills:  0        CONTINUE these medicines which may have CHANGED, or have new prescriptions. If we are uncertain of the size of tablets/capsules you have at home, strength may be listed as something that might have changed.      Dose / Directions   potassium chloride 20 MEQ packet  Commonly known as:  KLOR-CON  This may have changed:  Another medication with the same name was removed. Continue taking this medication, and follow the directions you see here.      Dose:  20 mEq  Take 20 mEq by mouth daily  Refills:  0        CONTINUE these medicines which have NOT CHANGED      Dose / Directions   albuterol 108 (90 Base) MCG/ACT inhaler  Commonly known as:  PROAIR HFA/PROVENTIL HFA/VENTOLIN HFA  Used for:  Ovarian cancer, unspecified laterality (H), Wheezing      Dose:  2 puff  Inhale 2 puffs into the lungs every 6 hours as needed for shortness of breath / dyspnea or wheezing  Quantity:  1 Inhaler  Refills:  1     CALCIUM + D PO  Used for:  Pelvic mass       Dose:  1 tablet  Take 1 tablet by mouth daily.  Refills:  0     clotrimazole 10 MG rell  Used for:  Thrush      Dose:  10 mg  Take 1 Rell (10 mg) by mouth 5 times daily for 14 days  Quantity:  70 Rell  Refills:  0     cyanocobalamin 1000 MCG/ML injection  Commonly known as:  CYANOCOBALAMIN  Used for:  B12 deficiency      Dose:  1 mL  Inject 1 mL (1,000 mcg) into the muscle every 30 days  Quantity:  1 mL  Refills:  11     diphenoxylate-atropine 2.5-0.025 MG tablet  Commonly known as:  LOMOTIL  Used for:  Acute diarrhea      Dose:  2 tablet  Take 2 tablets by mouth 4 times daily as needed for diarrhea  Quantity:  60 tablet  Refills:  0     dronabinol 2.5 MG capsule  Commonly known as:  MARINOL  Used for:  Ovarian cancer, right (H), Ovarian cancer, left (H), Poor appetite      Dose:  2.5 mg  Take 1 capsule (2.5 mg) by mouth 2 times daily (before meals)  Quantity:  60 capsule  Refills:  3     Ferrous Sulfate 324 (65 Fe) MG Tbec  Used for:  Ovarian cancer, right (H), Anemia, unspecified type, Ovarian cancer, left (H)      TAKE ONE TABLET BY MOUTH THREE TIMES DAILY WITH MEALS. TAKE WITH A SMALL AMOUNT OF ORANGE JUICE. DO NOT TAKE WITH CALCIUM  Quantity:  90 tablet  Refills:  11     HEMP OIL OR EXTRACT OR OTHER CBD CANNABINOID (NOT MEDICAL CANNABIS)      Refills:  0     HERBALS      daily  Refills:  0     iohexol 140 MG/ML solution for oral use  Commonly known as:  OMNIPAQUE  Used for:  Metastatic cancer (H)      Mix entire bottle (50ml) of contast with 600ml (20 ounces) of water and drink half 2 hrs prior to CT scan and half 1 hr prior to scan  Quantity:  140 mL  Refills:  0     LEVOTHYROXINE SODIUM PO      Dose:  50 mcg  Take 50 mcg by mouth daily  Refills:  0     loperamide 2 MG capsule  Commonly known as:  IMODIUM      Dose:  2 mg  Take 2 mg by mouth 4 times daily as needed for diarrhea  Refills:  0     LORazepam 1 MG tablet  Commonly known as:  ATIVAN  Used for:  Ovarian cancer, unspecified laterality (H)       Dose:  1 mg  Take 1 tablet (1 mg) by mouth every 6 hours as needed (Anxiety, Nausea/Vomiting or Sleep)  Quantity:  30 tablet  Refills:  2     magnesium oxide 400 MG tablet  Commonly known as:  MAG-OX  Used for:  Ovarian cancer, unspecified laterality (H)      Dose:  400 mg  Take 1 tablet (400 mg) by mouth 2 times daily  Quantity:  90 tablet  Refills:  3     order for DME  Used for:  B12 deficiency      Injection Supplies for Vitamin B12: 3cc syringes w/ 27 gauge needles, 1/2 inch length  Quantity:  3 each  Refills:  0     oxyCODONE 5 MG tablet  Commonly known as:  ROXICODONE  Used for:  Ovarian cancer, right (H), Ovarian cancer, left (H), Chronic pain due to neoplasm      Dose:  5 mg  Take 1 tablet (5 mg) by mouth every 6 hours as needed for severe pain  Quantity:  10 tablet  Refills:  0     prochlorperazine 10 MG tablet  Commonly known as:  COMPAZINE  Used for:  Ovarian cancer, right (H), Nausea      Dose:  10 mg  Take 1 tablet (10 mg) by mouth every 6 hours as needed for nausea or vomiting  Quantity:  30 tablet  Refills:  1     VITAMIN C PO  Used for:  Pelvic mass      Dose:  500 mg  Take 500 mg by mouth daily  Refills:  0     VITAMIN E NATURAL PO      Dose:  100 Units  Take 100 Units by mouth daily  Refills:  0        STOP taking    nystatin 126908 UNIT/ML suspension  Commonly known as:  MYCOSTATIN        omeprazole 20 MG DR capsule  Commonly known as:  priLOSEC              Where to get your medicines      These medications were sent to [facility] [place], [place] - [address]  [address] [address]    Phone:  [phone]     cefTRIAXone 2 GM vial    dexamethasone 4 MG tablet    pantoprazole 40 MG EC tablet     Some of these will need a paper prescription and others can be bought over the counter. Ask your nurse if you have questions.    Bring a paper prescription for each of these medications    oxyCODONE 5 MG tablet         Consultations:  Neurosurgery, Urology,  Palliative care, Radiation Oncology    Brief History of Illness:  59 year old female with recurrent ovarian cancer who presented with worsening fatigue, abdominal pain, and several week history of headache, shortness of breath, and weight loss. She is currently receiving Gemzar (C2D8 9/5) with the plan to receive 3 cycles and repeat imaging. She recently had an ERCP and stent placed on 8/23 due to worsening disease causing chronic cholecystitis and biliary stricture. Since that time, she says her pain initially improved, but then started increasing. It is in the RLQ and exacerbated with movement. The pain has been keeping her from eating. She has had nausea and no appetite. She lost ~25lbs. She has not had a BM in 3 days, she usually has loose stools requiring anti-diarrheals. She denies fevers, but has chills. She also reports 3-4 weeks of intermittent headaches that respond to Ibuprofen. She denies any vision changes, focal weakness or numbness. She also has shortness of breath that has worsened over the same time period (3-4 weeks).     In the ED, she had a CT C/A/P that revealed worsening disease in the chest with pleural effusions, carcinomatosis, and adenopathy. There is a mass near the liver that has increased in size, pneumobilia, and biliary dilation, findings concerning for possible cholangitis. She is afebrile, WBC normal, normal HR, hypotensive. Given concern for possible cholangitis, she was started on IV zosyn and GI was consulted. She was judiciously given IVF for hydration and BP support. She had multiple electrolyte abnormalities, replacement was started. She also has ascites, bowel thickening, and new left hydronephrosis/hydroureter. Given headache, CT head was obtained and showed new right-sided mass. Neurosurgery was consulted and recommended MRI brain, IV steroids, and IV protonix.     Hospital Course:  Dz:   - Recurrent ovarian cancer.  Patient with extensive disease. Gemzar currently on hold.  New brain met. Neurosurgery and radiation oncology consulted. Patient deciding which approach she would like to take for treatment of brain met. She will follow-up with Dr Yeung on 9/19 for a care plan. Palliative care was consulted and discussed code status with patient. She was not ready to fill out a POLST form at the time of discharge and will follow up with them as an outpatient.  FEN:   - She was maintained on IVF until her electrolytes corrected and she was tolerating PO. Hypokalemia/hypomagesemia/hypophosphatemia improved with ERP and K was 3.4, mag was 1.8, PO4 was 4.9 at time of discharge. By discharge, she was tolerating small amounts of a regular diet without nausea and vomiting and able to maintain her hydration without IVF supplementation.  Pain:   - She was given prn oxycodone on admission. Her pain was well controlled on this and she was discharged home with this.   CV:   - She has no history of CV issues.  She had hypotension on admission that improved during her hospital stay. Her vital signs were stable while in house and she had no acute CV issues.  PULM:   - She has no history of pulmonary issues.  Her O2 sats were greater than 94% on RA.  She was encouraged to use her bedside IS while in house.  She had no acute pulmonary issues while in house.  HEME:   - Her Hgb was 9.7.  Anemia of chronic disease. Her hgb was stable at 10.6 at the time of discharge. Thrombocytopenia, likely chemotherapy induced. Her platelets were stable at 104 on discharge. She has a history of pulmonary embolism and DVT in 2012 and is not on chronic anticoagulation.  She had no other acute heme issues while in house.  GI:   - She was made NPO on admission for possible procedures. No immediate procedures were required so her diet was advanced to slowly as tolerated.  At the time of discharge, she was tolerating a regular diet without nausea and vomiting.  Per GI,  Dr. Lloyd's team will contact patient to scheduled ERCP in  the future to remove stents. She will be discharged with a bowel regimen to prevent constipation.  She had no acute GI issues while in house.  :    - CT scan noted moderate right hydronephrosis and hydroureter. Urology was consulted and did not feel patient required a stent at this time as creat was normal at 0.52. The patient was voiding spontaneously without difficulty.  She will follow up with urology upon discharge for renal ultrasound and possible stent placement. She had no acute  issues while in house.  ID:   - The patient was AF during her hospitalization.  Noted to have choliangitis on CT scan and subsequently started on zosyn. BC drawn in ED from port at time of admission and was + for Klebsiella pneumonia.  ID consulted. She will complete 14 days of IV ceftriaxone end date 9/25. Per ID she needs to complete 14 day course of IV antibiotic. Peripheral and port BC drawn 9/11, 9/12 and 9/13 were NGTD. Per Neurosurgery she needs 5 days of negative blood cultures prior to her upcoming procedure. Another set was drawn prior to her discharge on 9/14. She will get the 5th drawn as an outpatient on Monday September 16th. Orders were placed for this and it was discussed with the patient and her spouse.  If she is admitted to the hospital for neurosurgery then ID should be consulted.   ENDO:   - No acute issues. Hx hypothyroidism, continued on synthroid.  PSYCH/NEURO:   - Occasional forgetfulness. New brain mass noted on MRI on 9/10. She was started on decadron for swelling and will continue 4mg BID per neurosurgery. Plan is for sheath assisted right craniotomy and tumor resection with neurosurgery on 9/18.  PPX:    - She was given SCDs and IS during her hospital course.  She tolerated these prophylactic interventions without incident.  They were discontinued at the time of her discharge.    Discharge Instructions and Follow up:  Ms. Odalys Nathan was discharged from the hospital with follow up for      Discharge Diet: Regular  Discharge Activity: Activity as tolerated  Discharge Follow up:   -9/19/19 Dr Yeung - will send message to get this re-scheduled as patient planning for surgery with neurosurgery on 9/18 and will likely be inpatient  -9/18/19 Dr. Dewey - Sheath assisted right craniotomy and tumor resection    Discharge Disposition:  Discharged to home with home health infusion    Discharge Staff: Dr. Manoj Ferrera MD  Ob/Gyn PGY-2  September 14, 2019 12:20 PM    Provider Disclosure:   I agree with above History, Review of Systems, Physical exam and Plan. I have reviewed the content of the documentation and have edited it as needed. I have personally performed the services documented here and the documentation accurately represents those services and the decisions I have made.     Electronically signed by:   Rosmery Aranda MD   Gynecologic Oncology   [facility]

## 2019-09-11 NOTE — PLAN OF CARE
VSS. Pt up with SBA. Tolerating regular diet. Pain controlled with oxycodone X1. Voids spont. MIV infusing through port. Pt forgetful but calls appropriately. Cont. POC.

## 2019-09-11 NOTE — PROGRESS NOTES
[facility] [address]  Palliative Care Initial Spiritual Assessment    Patient: Odalys Nathan  Date of Admission:  9/9/2019  Reason for consult: goals of care and patient/family coping      Summary and Recommendations:  Patient Odalys Nathan has excellent support from family (, children) and julius community (Yazdanism). While she hopes that radiation treatments might be effective, her primary interest is in spending time with family and friends.     Elders from [facility] are visiting regularly.     Palliative Care Spiritual Health will follow while Palliative Care is consulted.    These recommendations have been discussed with patient, family, and palliative care team.    Ely Gonzalez  Palliative   Pager [phone]  [facility] Inpatient Team Consult pager [phone] (M-F 8-4:30)  After-hours Answering Service [phone]      Assessments:   Patient Odalys Nathan,  Marcos, and daughter were in Odalys's room when I visited. Odalys said she zak with per situation through humor and julius. Odalys affirmed that she is not significantly distressed at this time.    Distress:  Distress in the context of serious illness was assessed today:    Existential/spiritual/emotional distress: No; Odalys rests in the support of family and julius. Family demonstrated robust support throughout visit. Odalys told several stories that illustrated that support, as well as the support of friends and elders.      Samaritan distress:  No.      Social/economic/relational distress:  No.    Coping, Meaning, & Spirituality:   Coping, meaning, and/or spirituality in the context of serious illness were assessed today:    Spiritual background and preferences: Yazdanism; [facility] (currently meets in [place])      Beliefs, rituals, and practices: prayer, Zoroastrianism, scripture study, does not wish to receive whole blood products      Meaning-making:  through life experience, humor, and beliefs      Strengths and resources: family, julius community, personal resilience    Prognosis, Goals, & Planning:     Prognosis, Goals, and/or Advance Care Planning were assessed today: Yes - Odalys talked about possibility of radiation.      Preferred language: English      Patient's decision making preferences: shared with support from loved ones      I have concerns about the patient/family's health literacy today: No      Patient has a completed Health Care Directive: Yes, and on file.      Code status per chart review: full code    Key Palliative Symptom Data:  # Pain severity the last 12 hours: trinh Morrow's primary focus on symptoms was telling the story of her confusion the previous evening, and how she now needs to be redirected at times.      Interventions:  I offered emotional and spiritual support primarily through reflective listening that affirmed emotions, coping, and meaning.

## 2019-09-11 NOTE — PROGRESS NOTES
"S:  Denies headache this morning.       O:  Temp:  [95.1  F (35.1  C)-98.2  F (36.8  C)] 95.1  F (35.1  C)  Pulse:  [76] 76  Heart Rate:  [67-83] 67  Resp:  [18] 18  BP: ()/(48-55) 105/54  SpO2:  [97 %-98 %] 98 %    Exam:  General: Awake;  Alert, In No Acute Distress  Pulm: Breathing Comfortably on room air  Cranial Nerves: Cranial Nerves II-XII Intact Bilaterally  Strength:      Del Tr Bi WE WF Gr  R 5 5 5 5 5 5  L 5 5 5 5 5 5     HF KE KF DF PF   R 5 5 5 4 5   L 5 5 5 5 5   Pronator Drift: Absent  Sensory: Intact to Light Touch    Assessment:   Likely metastatic ovarian cancer to brain with ongoing medical comorbidities including thrombocytopenia, anemia, and bacteremia.     Recommendations:   -consider ID consultation   -Criteria for surgery:   -hgb > 12, platelet > 100k, negative blood cultures for 5 days   -continue decadron at present dose of 4q6  -Neurosurgery will sign off due to risk outweighing benefit for surgery due to present medical co-morbidity  -will arrange for follow-up in outpatient clinic if patient discharges from hospital  -protonix and bactrim while on decadron   -remainder of care per primary team     Prosper \"Brian\" MD [place]   Neurosurgery, PGY-3    Please contact neurosurgery resident on call with questions.    Dial * * [phone], enter [phone] when prompted.       "

## 2019-09-11 NOTE — PROGRESS NOTES
[facility]  Palliative Care Daily Progress Note       Recommendations & Counseling       No recommendations for changes to current medication regimen. We stressed the importance of a daily bowel regimen at discharge due to the opioids.    Goals are restorative in nature-wants to take things one step at a time.    We will continue to follow along while she is admitted.     Will also plan follow up with Palliative Care outpatient.     Patient discussed with Dr. Victoria Gilliam.    Mary Lou Richter DO  Palliative Medicine Fellow     Assessments     Odalys Nathan is a 59 year old female with recurrent ovarian cancer (on Gemzar) admitted for worsening fatigue, abdominal pain, SOB, and increased weight loss. Additionally, she has had worsening headache over the last week. Imaging in the ED revealed worsening metastatic disease, as well as biliary findings concerning for cholangitis and new R hydronephrosis and hydroureter. Head imaging also revealed a new mass, likely metastatic disease in right frontal lobe.      Today, the patient was seen for:  Ovarian cancer  Brain mass, likely metastatic  Poor Appetite  Abdominal pain  Goals of Care discussion    Prognosis, Goals, or Advance Care Planning was addressed today with: Yes.  Mood, coping, and/or meaning in the context of serious illness were addressed today: No.  Summary/Comments: Determined that surgery to remove the tumor is currently not an option due to the risks. She and her  were able to say that they understand this, as they want to focus on things that will add quality to her life. Currently focused on clearing the infection from her blood and is hoping that will give some time for her blood counts to improve. They were able to visit with her primary oncologist, and they are potentially looking into radiation treatments. She and her family are optimistic about this.      Interval History:     Chart  review/discussion with unit or clinical team members:   Had an episode of confusion overnight but was redirectable. No further issues. has had good pain control with oxycodone prn.    Per patient or family/caregivers today:  Feeling a little better today. States she had a couple BMs overnight and feels things are continuing to move. Felt a little more hungry today and was able to eat a little pizza and brownie during our conversation. Enjoying time with her family.    Odalys asked about the medical cannabis program. We provided information and recommended to hold off for now, which she agrees to. akn    [facility] Symptoms:  # Pain severity the last 12 hours: low  # Dyspnea severity the last 12 hours: none  # Nausea severity the last 12 hours: low  # Anxiety severity the last 12 hours: none    Patient is on opioids: assessed and bowels ok/no needed changes to plan of care today.           Review of Systems:     Besides above, an additional focused system ROS was reviewed and is unremarkable.          Medications:     I have reviewed this patient's medication profile and medications during this hospitalization.    Noted meds:    Decadron IV 4mg Q6H  Protonix 40mg IV daily  Miralax daily  Senna 2 tabs BID     Noted PRN meds are:  Dilaudid IV 0.2mg   x0  Melatonin 1mg   Zofran IV or ODT   x0  Oxycodone PO 5-10mg   -15mg in 24hrs  Compazine PO, IV, OR   x0           Physical Exam:   Temp: 95.1  F (35.1  C) Temp src: Oral BP: 105/54 Pulse: 76 Heart Rate: 67 Resp: 18 SpO2: 98 % O2 Device: None (Room air)    Constitutional: alert, no distress and smiling  Head: No abnormalities noted.  EENT: Lids and lashes normal, sclera non-icteric. No abnormalities of ears, nose, or mouth noted.  Cardiovascular: RRR. No murmurs.  Respiratory: Lungs clear to auscultation bilaterally.  Gastrointestinal: Abdomen soft, mostly non-tender except for LLQ. BS normal.   Musculoskeletal: extremities normal- no gross deformities noted and  normal muscle tone  Skin: no suspicious lesions or rashes  Neurologic: Appears grossly normal.  Psychiatric: mentation appears normal, affect normal/bright and judgment and insight intact.           Data Reviewed:     Reviewed recent pertinent imaging, comments:   CT Chest PE 9/9/19- Negative for PE. Small pleural effusions. Increased metastatic disease in the chest.      CT Abd/Pelvis 9/9/19- Evidence of tumor progression. Increased size of mass in liver with likely infiltration of duodenum. Increased mass effect on the portal vein. Bile duct stents in place. New right hydronephrosis and hydroureter with evidence of metastatic tissue connected to ureter. Small ascites, new from prior study. Prominent small bowel loops likely secondary to venous congestion vs. ascites vs. Enteritis, no evidence of obstruction. Overall worsening metastatic disease.      MR Brain 9/10/19- Impression:  1. Anterior right frontal axial mass appears predominantly centrally necrotic with scattered areas of intralesional hemorrhage. Abnormal enhancement primarily in the rim of the tumor consistent with viable tumor. Given the history of ovarian cancer, this is more likely metastatic, although a primary brain tumor could have a similar appearance.  2. Moderate surrounding vasogenic edema. Unchanged mass effect on the  right cerebrum with 4 mm right-to-left midline shift and slight subfalcine herniation.    Reviewed recent labs, comments:   Labs from 9/10/19-  Cr 0.53, GFR >90  Albumin 2.0, Tot Protein 6.1  ALT 60 (H)  Hgb 9.8  Plt 86      Patient seen and evaluated with Dr. Richter   Agree with assessment and recommendations. Any additions by me are in italics.    Total time spent was 35 minutes,  >50% of time was spent counseling and/or coordination of care regarding symptom management, treatment plan.    Victoria Gilliam MD  Palliative Medicine  Pager [phone]

## 2019-09-11 NOTE — CONSULTS
GENERAL ID SERVICE CONSULTATION     Patient:  Odalys Nathan   Date of birth 1960, Medical record number [id number]  Date of Visit:  09/11/2019  Date of Admission: 9/9/2019  Consult Requester:Rosmery Aranda MD          Assessment and Recommendations:   ASSESSMENT:    Mrs. Odalys Nathan is a 59 year old Mosque female, with pmhx of recurrent ovarian cancer (on Gemzar, 2nd/recent last dose in 8/29/2019, originally diagnosed in 2012) s/p colon resection in 2012, presented to ED on 9/9 for worsening fatigue, abdominal pain, SOB, weight loss of 25 lbs, and headaches for the past 3-4 weeks, found to have bacteremia.Her blood cultures from both 9/9 and 9/10 are positive for Klebsiella pneumoniae.     DISCUSSION:     Since her blood cultures from both 9/9 and 9/10 are positive for Klebsiella pneumoniae, we recommend that she transition from Zosyn IV (has been on since 9/9) to Ceftriaxone IV for narrower coverage. As for the source of this bacteremia, it is unclear at this moment. Per GI notes (though CT done on 9/9 showed some signs of biliary dilation), her presentation does not correlate with cholangitis and likely related to her cancer progression. Also, her ERCP stent placement (8/23/2019) could not explain this either, because she would've had signs and symptoms of infection/bacteremia soon after the procedure.        RECOMMENDATION:  1. Stop IV Zosyn  2. Start IV ceftriaxone 2 g q24hrs  3. Will not set a end date for ceftriaxone yet     Thank you for this consult. ID will continue to follow.     Patient was discussed with Dr. Hitchcock.     Stacy Toledo MD  pgy1  [phone]    Physician Attestation   I, Nai Hitchcock DO, saw this patient with the resident and agree with the resident/fellow's findings and plan of care as documented in the note.      I personally reviewed vital signs, medications, labs and imaging.    Key findings: 60 y/o female with metastatic ovarian cancer with liver metastasis  who presented on 9/9 with fever, abdominal pain and found to have Klebsiella pneumoniae bacteremia. Previously been diagnosed with chronic cholecystitis and treated with a transpapillary gallbladder stent. Found to have significant intrahepatic biliary stenosis and had a biliary stent placed. Continued to have significant obstriction 2/2 to extensive liver mets. Not thought to be a good candidate for repeat ERCP at this time given her progressive metastatic disease. We will narrow abx to Ceftriaxone at this time. Duration of therapy to be determined depending on clinical course given the recent finding of brain mets.     Nai Hitchcock DO  Date of Service (when I saw the patient): 9/11/19    ________________________________________________________________    Consult Question: klebsiella bacteremia, antibiotic regimen question  Admission Diagnosis: Ovarian cancer (H)         History of Present Illness:     Mrs. Odalys Nathan is a 59 year old Yazdanism female, with pmhx of recurrent ovarian cancer (on Gemzar, 2nd/recent last dose in 8/29/2019, originally diagnosed in 2012) s/p colon resection in 2012, presented to ED on 9/9 for worsening fatigue, abdominal pain, SOB, weight loss of 25 lbs, and headaches for the past 3-4 weeks.    She underwent an ERCP with stent placement on 8/23/2019 for worsening disease causing obstruction/chronic cholecystitis and biliary stricture. Initially the abdominal pain improved, and then increased in her RLQ, worse with movement. Due to the pain, pt was unable to eat and had nausea. No emesis.     On presentation to the [facility] ED, patient was afebrile, WBC 5.9, hypotensive at 98/57, procal elevated at 5.9. CT C/A/P showed worsening disease in chest with pleural effusions, adenopathy; mass near liver with increase in size. Biliary dilation possible for cholangitis. MRI showed new mass, likely metastatic disease in right frontal lobe. Both blood cultures from 9/9 and 9/10 grew  "Klebsiella pneumoniae. She was started on Zosyn IV at the ED on 9/10 and has been on this since then.     Today (9/11), she stated that she still has her throbbing headache once in a while, and her abdominal pain is still present, but mildly improved. She endorsed loss of appetite (\"food taste like cardboard\").      Cancer Hx: Please refer to oncologist's extensive H&P note dated 9/10/2019 for tx history. Originally diagnosed with ovarian cancer in 2012. Had multiple types of chemotherapy (includuing paclitaxel, cisplatin, doxil/carbo, carbo/taxol, etc.) since 2012 till present. Currently on Gemzar, had 2nd/most recent last dose on 8/29/2019. Pt wants to aggressively treat her metastatic cancer. In talks with neurosurgery and radiation oncology about radiation for her newly found brain met. Is willing to continue on with her Gemzar treatment. No blood transfusions, given that she is Mosque.            Review of Systems:   CONSTITUTIONAL:  No fevers or chills  EYES: negative for icterus  ENT:  negative for hearing loss, tinnitus and sore throat  RESPIRATORY:  negative for cough with sputum and dyspnea  CARDIOVASCULAR:  negative for chest pain, dyspnea  GASTROINTESTINAL:  negative for nausea, vomiting, diarrhea and constipation  GENITOURINARY:  negative for dysuria  HEME:  No easy bruising  INTEGUMENT:  negative for rash and pruritus  NEURO:  Endorsed headache         Past Medical History:     Past Medical History:   Diagnosis Date     Antiplatelet or antithrombotic long-term use      Ascites      Blood clot in the legs      Diabetes (H)      Ovarian cancer (H)     serous,stg IV     Pleural effusion      Pulmonary embolism (H) 2/2012     Refusal of blood transfusions as patient is Yazdanism      Short gut syndrome      Subclinical hypothyroidism 4/18/2013     Thrombosis of leg             Past Surgical History:     Past Surgical History:   Procedure Laterality Date     COLECTOMY       COLONOSCOPY  " 2/1/2012    Procedure:COLONOSCOPY; With Biopsy; Surgeon:TONI SULLIVAN; Location:[facility] OR     ENDOSCOPIC RETROGRADE CHOLANGIOPANCREATOGRAM N/A 8/23/2019    Procedure: Endoscopic Retrograde Cholangiopancreatogram with 4 mm Hurricane Balloon Dilation, gallbladder stent and Bile Duct stent placements, Bilarry Sphincterotomy ;  Surgeon: Catrachito Lloyd MD;  Location: [facility] OR     HYSTERECTOMY TOTAL ABD, RHIANNA SALPINGO-OOPHORECTOMY, NODE DISSECTION, TUMOR DEBULKING, COMBINED  2/1/2012    Procedure:COMBINED HYSTERECTOMY TOTAL ABDOMINAL, BILATERAL SALPINGO-OOPHORECTOMY, NODE DISSECTION, TUMOR DEBULKING;  Exploratory Laparotomy, Total Abdominal Hysterectomy, Bilateral Salpingo-Oophorectomy, appendectomy,lysis of adhesions, ileal, ascending, transverse and splenic flexure resection, ileal descending bowel renanastomosis, incidental cystotomy repair, CUSA procedure and colonoscopy ; [facility]     INSERT PORT PERITONEAL ACCESS  4/3/2012    Procedure:INSERT PORT PERITONEAL ACCESS; Intraperitoneal Port Placement (c-arm); Surgeon:SAMUEL CARRASCO; Location:[facility] OR     INSERT PORT PERITONEAL ACCESS  5/14/2014    Procedure: INSERT PORT PERITONEAL ACCESS;  Surgeon: Nga Yeung MD;  Location: [facility] OR     INSERT PORT VASCULAR ACCESS       LAPAROSCOPY DIAGNOSTIC (GYN)  5/14/2014    Procedure: LAPAROSCOPY DIAGNOSTIC (GYN);  Surgeon: Nag Yeung MD;  Location: [facility] OR     LAPAROTOMY EXPLORATORY Right 11/25/2015    Procedure: LAPAROTOMY EXPLORATORY;  Surgeon: Nga Yeung MD;  Location: [facility] OR     LAPAROTOMY, TUMOR DEBULKING, COMBINED  5/14/2014    Procedure: COMBINED LAPAROTOMY, TUMOR DEBULKING;  Surgeon: Nga Yeung MD;  Location: [facility] OR     REMOVE CATHETER PERITONEAL N/A 8/20/2014    Procedure: REMOVE CATHETER PERITONEAL;  Surgeon: Nga Yeung MD;  Location: [facility] OR     VASCULAR SURGERY      stent left iliac vein            Family History:   Reviewed and non-contributory.   Family History   Problem Relation  Age of Onset     Cancer Mother 69        lung, smoker     Cancer Maternal Uncle 65        brain     Colon Cancer Maternal Aunt 80        colon            Social History:     Social History     Tobacco Use     Smoking status: Former Smoker     Years: 8.00     Types: Cigarettes     Last attempt to quit: 1980     Years since quittin.2     Smokeless tobacco: Never Used     Tobacco comment: started at 9 yo and quit at 18 yo   Substance Use Topics     Alcohol use: Yes     Comment: 3x/day wine or jodi     History   Sexual Activity     Sexual activity: Not on file     Smoking history: smoked 1 pack per day for 10 years, but quit in .  Lives with  in [place] (25 minutes away).           Current Medications:       clotrimazole  1 Rell Buccal 5x Daily     dexamethasone PF  4 mg Intravenous Q6H     heparin  5 mL Intracatheter Q28 Days     heparin lock flush  5-10 mL Intracatheter Q24H     levothyroxine  50 mcg Oral Daily     pantoprazole (PROTONIX) IV  40 mg Intravenous Q24H     piperacillin-tazobactam  3.375 g Intravenous Q6H     polyethylene glycol  17 g Oral Daily     senna-docusate  2 tablet Oral BID     sodium chloride (PF)  3 mL Intracatheter Q8H            Allergies:   No Known Allergies         Physical Exam:   Vitals were reviewed  Patient Vitals for the past 24 hrs:   BP Temp Temp src Heart Rate Resp SpO2 Weight   19 1200 104/60 96.3  F (35.7  C) Oral 72 18 97 % --   19 0900 -- -- -- -- -- -- 60.9 kg (134 lb 3.2 oz)   19 0633 105/54 95.1  F (35.1  C) Oral 67 18 98 % --   09/10/19 2240 103/55 98.2  F (36.8  C) Oral 83 18 97 % --   09/10/19 2025 96/48 97.5  F (36.4  C) Oral 78 18 98 % --   09/10/19 1520 109/55 96.5  F (35.8  C) Oral 77 18 97 % --       Physical Examination:  GENERAL:  Cachetic appearing, in bed in no acute distress.   HEENT:  Head is normocephalic, atraumatic   EYES:  Eyes have anicteric sclerae without conjunctival injection or stigmata of endocarditis.     NECK:  Supple. No cervical lymphadenopathy  LUNGS:  Clear to auscultation bilateral.   CARDIOVASCULAR:  Regular rate and rhythm with no murmurs, gallops or rubs.  ABDOMEN:  Normal bowel sounds, soft, nontender. No appreciable hepatosplenomegaly  SKIN:  No acute rashes.  Has port in right upper chest, looks c/i. Line(s) are in place without any surrounding erythema or exudate. No stigmata of endocarditis.  NEUROLOGIC:  Grossly nonfocal. A&Ox3. Active x4 extremities. 4/5 in BLE.         Laboratory Data:     Inflammatory Markers    Recent Labs   Lab Test 01/03/13  1049   SED 11   CRP <5.0       Hematology Studies    Recent Labs   Lab Test 09/11/19  1151 09/10/19  0632 09/09/19  2218 09/05/19  1230 08/29/19  0927 08/23/19  1214 08/15/19  1337   WBC 5.6 4.1 5.9 7.2 6.0 6.2 6.5   ANEU 4.9 3.8 5.5 4.2 3.1  --  3.5   AEOS 0.0 0.0 0.0 0.1 0.2  --  0.1   HGB 10.3* 9.8* 9.7* 12.6 12.0 11.1* 11.7    99 98 100 101* 104* 102*   PLT 85* 86* 104* 316 448 142* 211       Metabolic Studies     Recent Labs   Lab Test 09/11/19  1151 09/11/19  0558 09/10/19  1447 09/10/19  0632 09/09/19  2218 09/09/19  0825    Canceled, Test credited  --  136 128* 130*   POTASSIUM 4.4 Canceled, Test credited  3.9 3.8 3.0* 2.8* 3.1*   CHLORIDE 106 Canceled, Test credited  --  101 95 95   CO2 24 Canceled, Test credited  --  22 22 27   BUN 7 Canceled, Test credited  --  7 7 7   CR 0.52 Canceled, Test credited  --  0.53 0.56 0.58   GFRESTIMATED >90 Canceled, Test credited  --  >90 >90 >90       Hepatic Studies    Recent Labs   Lab Test 09/11/19  1151 09/11/19  0558 09/10/19  0632 09/09/19  2218 09/09/19  0825 09/05/19  1230   BILITOTAL 1.2 Canceled, Test credited 2.0* 2.0* 2.1* 1.0   ALKPHOS 742* Canceled, Test credited 676* 738* 816* 997*   ALBUMIN 2.0* Canceled, Test credited 2.0* 2.0* 2.3* 2.8*   AST 33 Canceled, Test credited 38 44 68* 47*   ALT 60* Canceled, Test credited 60* 67* 80* 62*       Microbiology:  Culture Micro   Date Value  Ref Range Status   09/10/2019 No growth  Final   09/10/2019 (A)  Preliminary    Cultured on the 1st day of incubation:  Klebsiella pneumoniae     09/10/2019   Preliminary    Critical Value/Significant Value, preliminary result only, called to and read back by  Priyanka Parkinson RN at 1316 [id number]     09/10/2019 Susceptibility testing done on previous specimen  Preliminary   09/09/2019 (A)  Preliminary    Cultured on the 1st day of incubation:  Klebsiella pneumoniae     09/09/2019   Preliminary    Critical Value/Significant Value, preliminary result only, called to and read back by  Sadie Parkinson RN [id number] [id number] 9/10/19 am/cn     09/09/2019 Susceptibility testing in progress  Preliminary   09/09/2019   Preliminary    (Note)  POSITIVE for KLEBSIELLA PNEUMONIAE by [employer] multiplex nucleic acid  test. Final identification and antimicrobial susceptibility testing  will be verified by standard methods.    Specimen tested with [employer] multiplex, gram-negative blood culture  nucleic acid test for the following targets: Acinetobacter sp.,  Citrobacter sp., Enterobacter sp., Proteus sp., E. coli, K.  pneumoniae/oxytoca, P. aeruginosa, and the following resistance  markers: CTXM, KPC, NDM, VIM, IMP and OXA.    Critical Value/Significant Value called to and read back by PRIYANKA PARKINSON RN @1324 9/10/19. SCG       01/04/2013   Final    No Salmonella, Shigella, Campylobacter, E. coli O157, Aeromonas, or Plesiomonas   isolated.   01/18/2012   Final    No growth  The broth portion of this culture is being monitored for an additional 3 days.   If the broth becomes positive with growth, an amended report will be generated.       Urine Studies    Recent Labs   Lab Test 09/10/19  0830 08/12/19  0820 11/08/17  1440 08/10/17  0845 07/13/17  0900   LEUKEST Negative Negative Negative Negative Negative   WBCU <1 3 O - 2 1 <1       Vancomycin Levels  No lab results found.    Invalid input(s): VANCO       Hepatitis B Testing    Recent Labs   Lab Test 03/29/12  1143   HBSAB 0.0   HBCAB Negative   HEPBANG Negative     Hepatitis C Testing     Hepatitis C Antibody   Date Value Ref Range Status   03/29/2012 Negative NEG Final     Respiratory Virus Testing    No results found for: RS, FLUAG    Imaging:    CT Abd/pelvis (9/9/2019):  IMPRESSION: In this patient with a history of ovarian cancer, findings  are concerning for progression of disease since 7/11/2019:  1. Larger mass in inferior segment 6 in the liver, and increased  infiltrative tissue in the edmundo hepatis which extends along the right  portal portal branches and bile ducts, and inferiorly to the duodenum,  which is potentially now involved by tumor as there is new focal wall  edema and hypoenhancement at this location, versus postprocedural  edema.  1a. The tumoral progression is in keeping with rising CA-125 levels.  2. Increased mass effect on the extrahepatic portal vein, with  unchanged cavernous transformation and multiple collaterals.  3. New biliary stents. Mildly increased intrahepatic biliary ductal  dilatation. Increased pneumobilia, nonspecific given stents.  Cholangitis is not excluded given potentially worsening obstruction.  4. New moderate right hydronephrosis and hydroureter. The right ureter  appears potentially tethered to new enhancing tissue associated with  the right ovarian vein concerning for focal worsening metastatic  disease.  5. Mildly worsened retroperitoneal and mesenteric adenopathy.  6. New small volume ascites.  7. New mildly prominent loops of small bowel with thickened walls in  the lower central abdomen. No transition point to suggest obstruction.  Findings may be secondary to venous congestion given portal findings  and worsening ascites, or possibly enteritis.     9/9 CT chest:  IMPRESSION:   1. No evidence of acute pulmonary embolism.  2. New small-to-moderate right and trace left pleural effusions with  associated atelectasis.  3. Progression  of metastatic disease in the chest, including worsening  mediastinal, hilar, and supraclavicular adenopathy. Increased pleural  thickening at the right apex also concerning for developing pleural  carcinomatosis, possibly contributing to the pleural effusions.     9/10 MRI  Impression:  1. Anterior right frontal axial mass appears predominantly centrally  necrotic with scattered areas of intralesional hemorrhage. Abnormal  enhancement primarily in the rim of the tumor consistent with viable  tumor. Given the history of ovarian cancer, this is more likely  metastatic, although a primary brain tumor could have a similar  appearance.  2. Moderate surrounding vasogenic edema. Unchanged mass effect on the  right cerebrum with 4 mm right-to-left midline shift and slight  subfalcine herniation.

## 2019-09-11 NOTE — PLAN OF CARE
VSS.  Neuros intact.  A&O but forgetful.  Slightly off as to date or day of the week but is close; Example: Thought it was 9/13 and Saturday.  Re-orients easily.  Did have episode on night shift, see night RN note.  Bed alarm for safety when family not present.  Port currently with Magnesium being replaced, otherwise heparin locked.  Tolerating diet well. Denies pain. Ambulating in halls with SBA.  Magnesium re-check ordered for 1700.  MRI checklist started for planned brain MRI today.

## 2019-09-11 NOTE — CONSULTS
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     HISTORY OF PRESENT ILLNESS: Ms. Nathan is a 59 year old female with recurrent metastatic ovarian cancer. She was initially diagnosed in .  CA-125 at that time was 987.  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 She subsequently received adjuvant carbo/taxol (4 of which were IV/IP), from 12-12.  Over the course of next few years, she has received multiple courses of systemic therapy, repeat exploratory laparotomy for tumor debulking.  On 2015, underwent excision of a metastatic right oblique mass with close margin.  It was the only site of active disease at that time.  Despite an additional 5 mm gross margin, the final pathology showed one margin to be less than 1 mm. She was therefore referred to us and received 3600 cGy in 12 fractions to the surgical bed, which she completed on 2016. She was later found to have liver metastasis and periaortic/perihepatic adenopathy, and received Niraparib on TESARO study until .  She was then switched to carboplatin/dixol, sjngle agent paclitaxel and most recently gemcitabine.  On , she underwent ERCP with gallbladder and bile duct stent placement nonoperative management of cholecystitis.      Yesterday, Ms. Nathan presented to the ED due to profound fatigue, fever and worsening RUQ pain.  She also endorsed  new onset headache for the past 3-4 weeks.  CT of the head revealed a new 4.7 x 5.3 x 4.1 cm heterogeneously attenuating mass lesion in the anterior R frontal lobe.  Further evaluation with MRI confirmed a 4.7 x 5.6 x 4.0 cm centrally necrotic mass with vasogenic edema, felt to be most consistent with brain metastasis.  Additionally, CT scan of the chest, abdomen and pelvis showed further progression of systemic disease as well as imaging finding suspicious for cholangitis. Her blood culture did grow Klebsiella and she is on IV antibiotics.  Ms Nathan was evaluated by neurosurgery. Given ongoing medical comorbidities, the risk of surgery was felt to outweigh the benefit.  Of note, the patient is a Rastafarian, and so blood product is not an option. Her platelet count is 85,000.  We are therefore asked to see her to discuss radiation management of her brain metastasis.     On interview, Ms. Nathan states that she is feeling much better since she's been in the hospital. She states that her headache was of a mild one that would go away with Advil.  She denies focal weakness, numbness or tingling.  She has no visual changes, urinary or bowel incontinence.  She denies any seizure activities.    PAST MEDICAL HISTORY:   Past Medical History:   Diagnosis Date     Antiplatelet or antithrombotic long-term use      Ascites      Blood clot in the legs      Diabetes (H)      Ovarian cancer (H)     serous,stg IV     Pleural effusion      Pulmonary embolism (H) 2/2012     Refusal of blood transfusions as patient is Rastafarian      Short gut syndrome      Subclinical hypothyroidism 4/18/2013     Thrombosis of leg      Past Surgical History:   Procedure Laterality Date     COLECTOMY       COLONOSCOPY  2/1/2012    Procedure:COLONOSCOPY; With Biopsy; Surgeon:TONI SULLIVAN; Location:[facility] OR     ENDOSCOPIC RETROGRADE CHOLANGIOPANCREATOGRAM N/A 8/23/2019    Procedure: Endoscopic Retrograde Cholangiopancreatogram with 4 mm  Hurricane Balloon Dilation, gallbladder stent and Bile Duct stent placements, Bilarry Sphincterotomy ;  Surgeon: Catrachito Lloyd MD;  Location: [facility] OR     HYSTERECTOMY TOTAL ABD, RHIANNA SALPINGO-OOPHORECTOMY, NODE DISSECTION, TUMOR DEBULKING, COMBINED  2/1/2012    Procedure:COMBINED HYSTERECTOMY TOTAL ABDOMINAL, BILATERAL SALPINGO-OOPHORECTOMY, NODE DISSECTION, TUMOR DEBULKING;  Exploratory Laparotomy, Total Abdominal Hysterectomy, Bilateral Salpingo-Oophorectomy, appendectomy,lysis of adhesions, ileal, ascending, transverse and splenic flexure resection, ileal descending bowel renanastomosis, incidental cystotomy repair, CUSA procedure and colonoscopy ; [facility]     INSERT PORT PERITONEAL ACCESS  4/3/2012    Procedure:INSERT PORT PERITONEAL ACCESS; Intraperitoneal Port Placement (c-arm); Surgeon:SAMUEL CARRASCO; Location:[facility] OR     INSERT PORT PERITONEAL ACCESS  5/14/2014    Procedure: INSERT PORT PERITONEAL ACCESS;  Surgeon: Nga Yeung MD;  Location: [facility] OR     INSERT PORT VASCULAR ACCESS       LAPAROSCOPY DIAGNOSTIC (GYN)  5/14/2014    Procedure: LAPAROSCOPY DIAGNOSTIC (GYN);  Surgeon: Nga Yeung MD;  Location: [facility] OR     LAPAROTOMY EXPLORATORY Right 11/25/2015    Procedure: LAPAROTOMY EXPLORATORY;  Surgeon: Nga Yeung MD;  Location: [facility]     LAPAROTOMY, TUMOR DEBULKING, COMBINED  5/14/2014    Procedure: COMBINED LAPAROTOMY, TUMOR DEBULKING;  Surgeon: Nga Yeung MD;  Location: [facility]     REMOVE CATHETER PERITONEAL N/A 8/20/2014    Procedure: REMOVE CATHETER PERITONEAL;  Surgeon: Nga Yeung MD;  Location: [facility] OR     VASCULAR SURGERY      stent left iliac vein       CHEMOTHERAPY HISTORY: Multiple lines of systemic agents.  Most recently on gemcitabine.      PAST RADIATION THERAPY HISTORY: 3600 cGy in 12 fractions to the R anterior abdominal wall, completed in 01/2016    MEDICATIONS:   Per inpatient team    ALLERGIES:  No Known Allergies.    SOCIAL HISTORY: Former  smoker, quit at age 17.         FAMILY HISTORY:   family history includes Cancer (age of onset: 65) in her maternal uncle; Cancer (age of onset: 69) in her mother; Colon Cancer (age of onset: 80) in her maternal aunt.    REVIEW OF SYMPTOMS:  A full 14-point review of systems was performed.     PHYSICAL EXAMINATION:    /55 (BP Location: Right arm)   Pulse 76   Temp 96.6  F (35.9  C) (Oral)   Resp 18   Wt 60.9 kg (134 lb 3.2 oz)   SpO2 98%   BMI 22.33 kg/m    Lying in hospital bed in no acute distress.  A&O  HEENT: alopecia  CV: well perfused  Pulm: breathing comfortably on room air  Neuro: CN II-XII intact.  Muscle strength 5/5.  Sensation to light touch intact.  Interaction appropriate.     IMAGING:        ASSESSMENT AND PLAN: In summary, Mrs. Nathan is a 60 yo female with recurrent metastatic ovarian cancer who has received systemic therapy for the last 7 years.  She now developed a large lesion in the right frontal lobe with vasogenic edema, most likely secondary to ovarian cancer.  She is not felt to be a surgical candidate due to low platelet counts, and other medical co-morbidities including bacteremia.  She is not considering blood products as a Jehovah Witness.    We discussed radiotherapeutic management of brain metastases.  Options include whole brain radiation therapy and Gamma Knife radiosurgery.  Given the size of the lesion, I do not think whole brain radiotherapy would provide good local control.  I am thus in favor of Gamma Knife radiosurgery.  The size of the lesion precludes single treatment, and we will plan fractionated SRS 3000 cGy given over 5 fractions.    We discussed that she will require a tight fitting mask to be made for immobilization.  A Gamma Knife specific MRI will also need to be acquired for treatment planning purposes.     Ms. Nathan is in agreement and signed the consent form. Her MRI is scheduled at 8:30 tomorrow morning.  We will call her down for simulation later in  the day.        Thank you for allowing us to participate in this patient's care.  Please feel free to call with any questions or concerns.       Scarlet Tellez M.D./Ph.D.  Radiation Oncologist   Department of Therapeutic Radiology   [facility], [place]  Phone: [phone]

## 2019-09-11 NOTE — PLAN OF CARE
VSS on Ra. Pain managed w/ oxycodone 10mg. Denies nausea. Regular diet, taking sips of water. Pt is oriented to time, and place but not situation. Bed alarm was placed on pt because of episode of confusion and for safety. Pt came out into the hallway with belongings and shoes. When asked further questioning pt stated she knew where she was but was headed to the police station. Pt was redirected to her room and rested throughout the night. Voiding spontaneously. Commode at the bed, if needed. SBA. Continue POC.

## 2019-09-11 NOTE — PROGRESS NOTES
Therapy:IV ABX  Insurance:[employer]   Ded: $1375  Met: $1375    Co-Insurance:80/20  Max Out of Pocket: $2875  Met: $2875  Patient is now covered 100% for the rest of 2019.    [facility] 7C- in reference to admission date 9/9/19 to check IV ABX coverage    Please contact Intake with any questions, [phone] or In Basket pool, [facility] ([phone]).

## 2019-09-11 NOTE — LETTER
9/11/2019       RE: Odalys Nathna  [address]  [facility] [id number]     Dear Colleague,    Thank you for referring your patient, Odalys Nathan, to the [facility], [place], RADIATION ONCOLOGY. Please see a copy of my visit note below.    See consult note in inpatient chart.    Scarlet Tellez MD    Again, thank you for allowing me to participate in the care of your patient.      Sincerely,    Scarlet Tellez MD

## 2019-09-11 NOTE — PROGRESS NOTES
Gynecology Oncology Progress Note  September 11, 2019    Ms. Odalys Nathan is a 59 year old HD# 2 admitted with new brain metastasis and abdominal pain concerning for cholangitis.    Dz: Recurrent ovarian cancer. Most recently 3C single-agent paclitaxel (last 6/20/19) followed by Gemzar C2D8 on 9/5. CT C/A/P 9/9: Inc thoracic and abdominal adenopathy, pleural carcinomatosis and BL effusions, increase in liver mass and infiltration of edmundo hepatis with worsening ascites, right hydronephrosis/hydroureter.    24 hour events:   - MRI brain obtained and Neurosurgery discussed possible surgery, but would need patient's blood counts to be improved prior to surgery.   - GI evaluated patient and would not do repeat ERCP at this time. Continue antibiotics and trending LFTs.  - Palliative met with patient, recommendations pending  - Blood cultures positive for Klebsiella pneumoniae  - Urology consulted and would consider placing a stent if her creatinine were to rise and she were to develop worsening flank pain. No acute intervention  - Disoriented over night, re-oriented quickly and returned to her room.    Subjective: Odalys is doing well this morning. She is sleepy after a long night. She reports that her pain is fairly well controlled. She knows she is in the hospital, but got a little confused last night and thought she needed to leave and go the to police station after a hospital overhead page. She is doing better this morning. Tolerated regular diet.     Objective:   Vitals:    09/10/19 1520 09/10/19 2025 09/10/19 2240 09/11/19 0633   BP: 109/55 96/48 103/55 105/54   BP Location:  Right arm  Right arm   Pulse:       Resp: 18 18 18 18   Temp: 96.5  F (35.8  C) 97.5  F (36.4  C) 98.2  F (36.8  C) 95.1  F (35.1  C)   TempSrc: Oral Oral Oral Oral   SpO2: 97% 98% 97% 98%   Weight:         General: Chronically ill appearing female, in NAD  CV: RRR  Resp: CTAB, no increased work of breathing  Abdomen: soft, mildly  tender, moderately distended, no rebound or guarding. Bowel sounds present  Extremities: nontender, 1+ edema  Lines/Drains: Port    I/Os  (Yesterday // Since Midnight)  IV: 2428 mL // 20 ml  PO: 840 mL // NR  Urine 1550 mL +1 // 300 mL    New labs/imaging-  Repeat labs pending    Previous labs/imaging:  Blood cultures 9/9: Klebsiella pneumoniae, sensitivities pending    Urine culture 9/10: No growth in 24 hours     Assessment: 59 year old HD#2 admitted with abdominal pain and concern for possible cholangitis, and headache with new brain mass/mass effect.     Dz: Recurrent ovarian cancer. Dx 2012 s/p debulking, s/p multiple chemo cycles (carbo/taxol, carbo/doxil, IV/IP, avastin/cytoxan, 15C niraparib), interval debulking in 2014 with recurrence in 2015. Most recently 3C single-agent paclitaxel (last 6/20/19) followed by Gemzar C2D8 on 9/5. CT C/A/P 9/9: Inc thoracic and abdominal adenopathy, pleural carcinomatosis and BL effusions, increase in liver mass and infiltration of portal hepatis with worsening ascites, right hydronephrosis/hydroureter.  FEN: Regular, HypoNa, corrected. HypoK>ERP, HypoPh>ERP  Pain: PO oxycodone, IV dilaudid PRN  Heme: Hgb 9.7>9.8, Plt 104>84; [employer] (lab to draw minimal volume)  CV: Hypotension, BP improved after IVF bolus  Pulm: H/o DVT/PE 2012, no current anticoagulation. CT Chest 9/9 neg for PE. Progression of disease with R>L pleural effusions.  GI: ERCP and stent placement 8/23 due to cholecystitis and biliary stricture secondary to disease. CT A/P 9/9 increased pneumobilia, increased biliary duct dilation, possible cholangitis. Worsening edmundo-hepatis. Increased ascites. Tbili 1.4>2.0, AlkPhos 738>676, ALT 67>60, AST 44. GI consulted and does not think repeat ERCP would help, especially in context of other problems. D#2 IV zosyn q6h. Protonix IV daily.  : Right moderate hydronephrosis, hydroureter with adjacent disease. Cr 0.56. Urine cultures pending. Urology may place  stent if Cr rises with worsening pain.  ID: blood culture from port on 9/9 growing klebsiella pneumoniae. Zosyn as above. Afebrile, WBC 5.9>4.1, lactate 1.0, procal 5.98. Consult ID this AM.  Endo: Hypothyroidism (synthroid)  Psych/Neuro/MSK: New  5.6 cm brain mass on CT/MRI, R anterior frontal lobe, central necrosis, intralesional hemorrhage, moderate vasogenic edema, mass effect on R cerebrum, midline shift, mild subfalcine herniation. Headache, no focal deficits. Neurosurgery consulted and does not recommend surgery at this time given JW and does not agree to transfusion. Radiation Oncology consulted. Dexamethasone 4mg IV q6h. Episode of confusion/disorientation overnight, resolved this morning.  PPX: Protonix, SCDs, IS  Dispo: Pending workup.  Drains/Lines: Port  Consults: Neurosurgery, GI, urology, palliative, Rad Onc, ID    Rafy Santos MD, MPH  Gynecologic Oncology, PGY-2  September 11, 2019 , 6:40 AM    Pager [phone]    Provider Disclosure:   I agree with above History, Review of Systems, Physical exam and Plan. I have reviewed the content of the documentation and have edited it as needed. I have personally performed the services documented here and the documentation accurately represents those services and the decisions I have made.     Electronically signed by:   Rosmery Aranda MD   Gynecologic Oncology   [facility]

## 2019-09-12 NOTE — PROGRESS NOTES
Gynecology Oncology Progress Note  September 12, 2019    Ms. Odalys Nathan is a 59 year old HD# 3 admitted with new brain metastasis and abdominal pain concerning for cholangitis.    Dz: Recurrent ovarian cancer. Most recently 3C single-agent paclitaxel (last 6/20/19) followed by Gemzar C2D8 on 9/5. CT C/A/P 9/9: Inc thoracic and abdominal adenopathy, pleural carcinomatosis and BL effusions, increase in liver mass and infiltration of edmundo hepatis with worsening ascites, right hydronephrosis/hydroureter and CT brain with new brain mass.    24 hour events:   - Radiation oncology consulted, recommended Gamma knife, in 5 fractions.  - Neurosurgery signed off. Would not recommend surgery given low hemoglobin and platelets.  - ID consulted, recommended treating Klebsiella bacteremia with ceftriaxone, daily blood cultures  - GI consulted, no intervention at this time. Continue antibiotics    Subjective: Odalys is okay. She is ready for her MRI this morning. She is hungry. She says pain is well controlled. She denies any confusion overnight. Ambulating, voiding without difficulty. No fevers or chills. No chest pain, SOB, cough.      Objective:   Vitals:    09/11/19 1506 09/11/19 1855 09/11/19 2218 09/12/19 0600   BP: 105/55 109/60 125/70 121/73   BP Location: Right arm Right arm Right arm Right arm   Pulse:       Resp: 18 18 15 15   Temp: 96.6  F (35.9  C) 96.6  F (35.9  C) 96.5  F (35.8  C) 96.5  F (35.8  C)   TempSrc: Oral Oral Oral Oral   SpO2: 98% 97% 97% 95%   Weight:         General: Chronically ill appearing female, in NAD  CV: RRR  Resp: CTAB, no increased work of breathing  Abdomen: soft, mildly tender, moderately distended, no rebound or guarding. Bowel sounds present  Extremities: nontender, 1+ edema  Neuro/Psych: Alert and oriented. Answers questions appropriately. Normal affect.  Lines/Drains: Port    I/Os  (Yesterday // Since Midnight)  IV: 40 mL // 0 ml  PO: 720 mL // NR  Urine 1900 ml // 600  mL    New labs/imaging-  Repeat labs and blood cultures    Previous labs/imaging:  Hemoglobin 10.3, Platelets 85  Cr 0.52, AST 33, ALT 60, Alk Phos 742, Tbili 1.2  Blood cultures 9/9, 9/10: Klebsiella pneumoniae, resistant to ampicillin  Blood culture 9/11: NG after 22h  Urine culture 9/10: No growth     Assessment: 59 year old HD#3 admitted with abdominal pain and concern for possible cholangitis, and headache with new brain mass/mass effect.     Dz: Recurrent ovarian cancer. Dx 2012 s/p debulking, s/p multiple chemo cycles (carbo/taxol, carbo/doxil, IV/IP, avastin/cytoxan, 15C niraparib), interval debulking in 2014 with recurrence in 2015. Most recently 3C single-agent paclitaxel (last 6/20/19) followed by Gemzar C2D8 on 9/5. CT C/A/P 9/9: Inc thoracic and abdominal adenopathy, pleural carcinomatosis and BL effusions, increase in liver mass and infiltration of portal hepatis with worsening ascites, right hydronephrosis/hydroureter.  FEN: Regular, HypoNa, corrected. HypoK>ERP, HypoPh>ERP  Pain: PO oxycodone, IV dilaudid PRN  Heme: Hgb 9.7>9.8, Plt 104>84; Roman [employer] (lab to draw minimal volume)  CV: Hypotension, BP improved after IVF bolus  Pulm: H/o DVT/PE 2012, no current anticoagulation. CT Chest 9/9 neg for PE. Progression of disease with R>L pleural effusions.  GI: ERCP and stent placement 8/23 due to cholecystitis and biliary stricture secondary to disease. CT A/P 9/9 increased pneumobilia, increased biliary duct dilation, possible cholangitis. Worsening edmundo-hepatis. Increased ascites. Tbili 1.4>2.0, AlkPhos 738>676, ALT 67>60, AST 44. GI consulted and does not think repeat ERCP would help, especially in context of other problems. 2D IV zosyn>D#2 IV ceftriaxone, per ID. Course to be determined pending daily blood cultures. Protonix IV daily while on decadron.  : Right moderate hydronephrosis, hydroureter with adjacent disease. Cr 0.56. Urine cultures pending. Urology may place stent if Cr rises  with worsening pain.  ID: blood culture from port on 9/9. 9/10 growing klebsiella pneumoniae, susceptible to ceftriaxone. ID consulted, antibiotics as above. Afebrile, WBC 5.6, lactate 1.0, procal 5.98.  Endo: Hypothyroidism (synthroid)  Psych/Neuro/MSK: New  5.6 cm brain mass on CT/MRI, R anterior frontal lobe, central necrosis, intralesional hemorrhage, moderate vasogenic edema, mass effect on R cerebrum, midline shift, mild subfalcine herniation. Headache, no focal deficits. Neurosurgery consulted and does not recommend surgery at this time given JW with low counts and does not agree to transfusion. Radiation Oncology consulted and recommends Gamma knife (5 fractions). Will proceed with Gamma knife-specific MRI this AM, and simulation later today. Dexamethasone 4mg IV q6h. Episode of confusion/disorientation 9/10, intermittently confused and disoriented, but reorients.  PPX: Protonix, SCDs, IS  Dispo: Pending workup.  Drains/Lines: Port  Consults: Neurosurgery, GI, urology, palliative, Rad Onc, ID    Rafy Santos MD, MPH  Gynecologic Oncology, PGY-2  September 12, 2019 , 6:58 AM    Pager [phone]    Provider Disclosure:   I agree with above History, Review of Systems, Physical exam and Plan. I have reviewed the content of the documentation and have edited it as needed. I have seen and personally performed the services documented here and the documentation accurately represents those services and the decisions I have made.     Electronically signed by:   Garry Riggins MD   Gynecologic Oncology   [facility]

## 2019-09-12 NOTE — PLAN OF CARE
VSS. Pt reports abdominal pain, controlled with Oxy x1. Port hep locked. Neuro checks intact, besides disoriented to time & forgetful, denies HA. Lung sounds clear with diminished bases bilaterally, pt denies SOB. Pt up with SBA, alarm on, voiding adequately. Multiple family @ bedside most of the evening, supportive & involved in care. Plan for brain MRI tomorrow @ 0830.

## 2019-09-12 NOTE — PROGRESS NOTES
"S: no acute events overnight. Patient and her  do not agree to platelet transfusion.     O:  Temp:  [95.9  F (35.5  C)-96.6  F (35.9  C)] 95.9  F (35.5  C)  Heart Rate:  [71-86] 86  Resp:  [15-18] 16  BP: (105-125)/(55-73) 115/64  SpO2:  [95 %-98 %] 96 %    Exam:  General: Awake;  Alert, In No Acute Distress  Pulm: Breathing Comfortably on room air  Cranial Nerves: Cranial Nerves II-XII Intact Bilaterally  Strength:      Del Tr Bi WE WF Gr  R 5 5 5 5 5 5  L 5 5 5 5 5 5     HF KE KF DF PF   R 5 5 5 4 5   L 5 5 5 5 5   Pronator Drift: Absent  Sensory: Intact to Light Touch    CBC: wbc 5, hgb 10, and platelet 80     Assessment:   Likely metastatic ovarian cancer to brain with ongoing medical comorbidities including thrombocytopenia, anemia, and bacteremia.     Recommendations:   -Criteria for surgery:   platelet > 100k, negative blood cultures for 5 days   -continue decadron at present dose of 4q6  -discussed risks, benefits, and alternatives of craniotomy for tumor resection with patient and  who are understanding; ideally, the treatment pathway would be surgery followed by stereotactic radiosurgery for decreasing risk of local recurrence   -protonix while on decadron   -remainder of care per primary team     Prosper \"Brian\" MD [place]   Neurosurgery, PGY-3    Please contact neurosurgery resident on call with questions.    Dial * * [phone], enter [phone] when prompted.       Neurosurgery faculty note    I had met this patient in the radiosurgery suite when she was being considered for SRS. In brief, this is a 59 y.o. F with stage IV ovarian cancer with a > 4 cm right frontal cystic lesion with significant justino-tumoral FLAIR signal abnormality. The patient is neurologically non-focal. Of note, the patient is a Taoism who declined any transfusion, including platelet. Given the size of the patient's right frontal lesion, I believe that the patient would benefit from surgical resection followed by SRS. " However, the patient's platelet is currently in the 80's and her recent blood culture grew out Klebsiella. The patient's currently non-focal and her platelet level was normal as of ~ a week prior. While going forward with fractionated SRS at this time is reasonable, I do think that another option is to proceed with surgery after normalization of platelet and sterilization of ongoing sepsis. I had reviewed both options with the patient in person. The patient expressed preference for the latter. I will continue to follow the patient in this context.    I spent 45 minutes personally evaluating clinical and imaging findings & managing the care of this patient. Over 50% of my time on the unit was spent counseling the patient on the next steps and discussing her choice with my radiation oncology colleagues.

## 2019-09-12 NOTE — PROGRESS NOTES
GAMMA KNIFE PLANNING FOR FRACTIONATED TREATMENT    Name: Odalys Nathan  : 1960 Medical Record #: [id number]  Diagnosis: C56.9 Malignant neoplasm of unspecified ovary  Date of Treatment: 2019  Referring Physicians: Aubrie Hester, Tumor Registry    DESCRIPTION OF PROCEDURE:    PREPLANNING:  A stereotactic brain MRI was performed today and target were delineated and preplanned. A cone beam CT scan was done for help with the fusion. The Gamma Plan software was used to create a highly conformal dose distribution while sparing surrounding vital structures. These images were then sent to the [employer] Gamma Knife computer system where a three dimensional stereotactic plan was generated.    MASK:     Today, the patient was brought to the ICON Gamma Knife suite at [facility].  An aquaplast mask was made and a cone beam CT done.      SPECIAL TREATMENT PROCEDURE:  Time consuming treatment planning was performed from the MRI images including 3D reconstruction.  The fabrication of the aquaplast mask, evaluation of the stereotactic brain MRI images, electronic image transfer to [employer] Gamma Knife treatment planning computer were accomplished. The [employer] Gamma Knife treatment planning software was used to design the optimal treatment plan.   procedures were done prior to treatment.  Because of the extra time and effort involved in Gamma Knife treatment planning, this represents a  special treatment procedure.       ADDENDUM:  After the mask making and treatment planning MRI, Ms. Nathan had a chance to talk to Dr. Bertin Dewey.  Ultimately it was decided for her to proceed with surgical resection after her platelet counts improve and sepsis cleared.    We will bring her back for fractionated GK SRS of the surgical cavity.        Approved by:  Scarlet Tellez MD

## 2019-09-12 NOTE — LETTER
2019       RE: Odalys Nathan  [id number]  [place] [address]     Dear Colleague,    Thank you for referring your patient, Odalys Nathan, to the [facility], RADIATION ONCOLOGY. Please see a copy of my visit note below.    GAMMA KNIFE PLANNING FOR FRACTIONATED TREATMENT    Name: Odalys Nathan  : 1960 Medical Record #: [id number]  Diagnosis: C56.9 Malignant neoplasm of unspecified ovary  Date of Treatment: 2019  Referring Physicians: Aubrie Hester, Tumor Registry    DESCRIPTION OF PROCEDURE:    PREPLANNING:  A stereotactic brain MRI was performed today and target were delineated and preplanned. A cone beam CT scan was done for help with the fusion. The Gamma Plan software was used to create a highly conformal dose distribution while sparing surrounding vital structures. These images were then sent to the [employer] Gamma Knife computer system where a three dimensional stereotactic plan was generated.    MASK:     Today, the patient was brought to the ICON Gamma Knife suite at [facility].  An aquaplast mask was made and a cone beam CT done.      SPECIAL TREATMENT PROCEDURE:  Time consuming treatment planning was performed from the MRI images including 3D reconstruction.  The fabrication of the aquaplast mask, evaluation of the stereotactic brain MRI images, electronic image transfer to [employer] Gamma Knife treatment planning computer were accomplished. The [employer] Gamma Knife treatment planning software was used to design the optimal treatment plan.   procedures were done prior to treatment.  Because of the extra time and effort involved in Gamma Knife treatment planning, this represents a  special treatment procedure.       ADDENDUM:  After the mask making and treatment planning MRI, Ms. Nathan had a chance to talk to Dr. Bertin Dewey.  Ultimately it was decided for her to proceed with surgical resection after  her platelet counts improve and sepsis cleared.    We will bring her back for fractionated GK SRS of the surgical cavity.        Approved by:  Scarlet Tellez MD    Again, thank you for allowing me to participate in the care of your patient.      Sincerely,    Scarlet Tellez MD

## 2019-09-12 NOTE — PLAN OF CARE
/70 (BP Location: Right arm)   Pulse 76   Temp 96.5  F (35.8  C) (Oral)   Resp 15   Wt 60.9 kg (134 lb 3.2 oz)   SpO2 97%   BMI 22.33 kg/m      Reason for admission: Fatigue, abdominal pain, SOB, increased weight loss, HA.  Hx of recurrent ovarian cancer (worsening metastatic disease), R hydronephrosis, new R frontal lobe mass.  Vitals: VSS.  Activity: Up SBA.  Pain: Controlled with oxycodone (given prior to MRI)  Neuro: AO x 4.  No confusion overnight.   Cardiac: WDL.  Respiratory: WDL.  GI/: Voiding spontaneously.  Diet: Regular.    Lines: R chest port hep locked.  Skin/Wounds: WDL.   Plan: MRI and radiation mask fitting this AM.      Continue to monitor and follow POC    Max Cano RN on 9/12/2019 at 5:35 AM

## 2019-09-12 NOTE — PLAN OF CARE
Alert and oriented x4,forgetful, ambulating in halls with sba. Oxycodone given for abdominal pain with relief, voiding spont, tolerating regular diet. Brain MRI done this am.Bed alarm on when family not at bedside.

## 2019-09-13 NOTE — PROGRESS NOTES
Care Coordinator - Discharge Planning    Admission Date/Time:  9/9/2019  Attending MD:  Rosmery Aranda MD     Data  Date of initial CC assessment:  Ms. Nathan has been followed daily since admission by the [facility] Management Team.  Chart reviewed, discussed with interdisciplinary team.   Patient was admitted for:   1. Malignant neoplasm of ovary, unspecified laterality (H)    2. Brain metastasis (H)         Assessment   The following home care plans of care have been arranged on behalf of patient who may be ready for discharge soon with the need for home IV antibiotics via port a cath.  The following home are plans of care have been arranged after insurance inquiry that revealed patient has coverage for home skilled interventions as follows:    Please fax discharge orders to [facility]     Ph:  [phone]     Fax: [phone]     Skilled home care RN for initial home safety evaluation and 1-3 times a week to evaluate medication management, nutrition and hydration evaluation, endurance evaluation, and general status evaluation after discharge from the acute care hospital setting.     Skilled home care RN to assist with management and education reinforcement with home IV antibiotics via port a cath per MD orders.  Port a cath cares per home care agency routine.     Coordination of Care and Referrals: Provided patient/family with options for home care agency of choice who can assist with the discharge plan of care.      Plan  Anticipated Discharge Date:  To be determined.  Anticipated Discharge Plan:  Tentatively as above per MD team.  CM RN will continue to follow for updated needs prn. Spouse will provide transportation to home and to follow up clinic appointments.    CTS Handoff completed:  (clinic letter)  To be sent.    Arabella Cervantes, B.S.N., R.N., P.H.N.        Pager

## 2019-09-13 NOTE — PROGRESS NOTES
"  [facility] GENERAL INFECTIOUS DISEASES : PROGRESS NOTE  Odalys Nathan : 1960 Sex: female:   Medical record number [id number] Attending Physician: Rosmery Aranda MD  Date of Service: 2019    ASSESSMENT:  Mrs. Odalys Nathan is a 59 year old Judaism female, with pmhx of recurrent ovarian cancer (on Gemzar, 2nd/recent last dose in 2019, originally diagnosed in ) s/p colon resection in , presented to ED on  for worsening fatigue, abdominal pain, SOB, weight loss of 25 lbs, and headaches for the past 3-4 weeks, found to have bacteremia.Her blood cultures from both  and 9/10 are positive for Klebsiella pneumoniae.    DISCUSSION:   As for the source of this bacteremia, it is not totally clear at this moment, but most likely GI, because during the ERCP 3 weeks ago, GI noted significant intrahepatic biliary stenosis from her metastatic disease (which prompted biliary stenting). Per GI notes from 9/10 (though CT done on  showed some signs of biliary dilation), \"her presentation does not correlate with cholangitis and likely related to her cancer progression.\" Her ERCP stent placement (2019) could not explain this either, because she would've had signs and symptoms of infection/bacteremia soon after the procedure.    Per GIs note , they are signing off and will take out stent in the future, outpatient setting. They do not see \"the utility of proceeding with an ERCP at any point in the future as we unlikely to improve her overall survival, improve her RUQ pain, or get to a point where she may receive palliative chemo.\"   Neurosurgery is willing to do brain tumor resection with 5 days of negative blood cultures, and patient is willing to go through with this procedure.   If this is truly an infection of GI etiology (no other clear sources of infection, urine culture negative), and if there is no repeat ERCP (no stents that will be placed in the future), it is " difficult to put an end date to her current ceftriaxone, since this may be an ongoing issue with no proper source control. For now, will recommend continuing ceftriaxone up until brain tumor resection, for a total of 14 days on ceftriaxone. If patient decides not to get the brain resection, then can discontinue ceftriaxone and watch. Oral options are Augmentin, ciprofloxacin and bactrim, but ciprofloxacin would be high risk given the potential for CNS side effects. has a lot of known side effects, and bactrim may not be a good option with her current kidney issues. Thus, Augmentin PO (though sensitivity is not ideal) would be an option later. Blood cultures from 9/11,9/12, 9/13 are NGTD.    RECOMMENDATION:  1. Continue Ceftriaxone 2g IV q24hrs up until brain tumor surgery resection (Recommend no more than a total of 14 days on ceftriaxone). (Start date 9/11 - End Date 9/25). Hopefully, this will cover her until the time of her neurosurgical procedure.      Thank you for this consult. ID will continue to follow.      Patient was discussed with Dr. Hitchcock.      Stacy Toledo MD  [email or web address]  [phone]      Physician Attestation   I, Nai Hitchcock DO, saw this patient with the resident and agree with the resident/fellow's findings and plan of care as documented in the note.      I personally reviewed vital signs, medications, labs and imaging.    Key findings: Klebsiella bacteremia, source thought 2/2 to intrahepatic biliary strictures. ERCP held currently due to the finding of brain mets and the need for possible neurosurgical resection. Would continue Ceftriaxone for a total of 14 days as this will cover her up until the time of neurosurgery.     Nai Hitchcock DO  Date of Service (when I saw the patient): 9/09/19        SUBJECTIVE:   Denied SOB, chest pain. Endorsed continued loss of appetite, but is trying to finish most of her food on the plate. Lower abdominal pain endorsed, which has not worsened or improved since  admission.     ROS:  A five-point review of systems was obtained and was negative with the exception of that which is described above.  No Known Allergies    No current outpatient medications on file.     CURRENT ANTI-INFECTIVES:   Ceftriaxone  EXAMINATION: (Recommend ? 5 systems)   Vital Signs: /73 (BP Location: Right arm)   Pulse 76   Temp 96.1  F (35.6  C) (Oral)   Resp 16   Wt 60.9 kg (134 lb 3.2 oz)   SpO2 98%   BMI 22.33 kg/m     GENERAL:  Cachetic appearing, in bed in no acute distress.   HEENT:  Head is normocephalic, atraumatic   EYES:  Eyes have anicteric sclerae without conjunctival injection or stigmata of endocarditis.    NECK:  Supple. No cervical lymphadenopathy  LUNGS:  Clear to auscultation bilateral.   CARDIOVASCULAR:  Regular rate and rhythm with no murmurs, gallops or rubs.  ABDOMEN:  Normal bowel sounds, soft. No appreciable hepatosplenomegaly. Diffuse lower abdominal tenderness on palpation. No rebound.  SKIN:  No acute rashes.  Has port in right upper chest, looks c/i. Line(s) are in place without any surrounding erythema or exudate. No stigmata of endocarditis.  NEUROLOGIC:  Grossly nonfocal. A&Ox3. Active x4 extremities. 4/5 in BLE.    NEW DATA/RESULTS:     Culture Micro   Date Value Ref Range Status   09/13/2019 No growth after 5 hours  Preliminary   09/13/2019 No growth after 5 hours  Preliminary   09/12/2019 No growth after 1 day  Preliminary   09/12/2019 No growth after 1 day  Preliminary   09/11/2019 No growth after 2 days  Preliminary       Recent Labs   Lab Test 01/03/13  1049   CRP <5.0     Recent Labs   Lab Test 09/13/19  0656 09/12/19  0653 09/11/19  1151 09/10/19  0632 09/09/19  2218 09/05/19  1230   WBC 5.0 5.1 5.6 4.1 5.9 7.2     Recent Labs   Lab Test 09/12/19  0653 09/11/19  1151 09/11/19  0558 09/10/19  0632   CR 0.51* 0.52 Canceled, Test credited 0.53   GFRESTIMATED >90 >90 Canceled, Test credited >90       Hematology Studies  Recent Labs   Lab Test  09/13/19  0656 09/12/19  0653 09/11/19  1151 09/10/19  0632 09/09/19  2218 09/05/19  1230 08/29/19  0927   WBC 5.0 5.1 5.6 4.1 5.9 7.2 6.0   ANEU  --  4.6 4.9 3.8 5.5 4.2 3.1   AEOS  --  0.0 0.0 0.0 0.0 0.1 0.2   HCT 33.3* 31.6* 32.0* 29.5* 29.4* 38.5 36.8   * 80* 85* 86* 104* 316 448       Metabolic  Recent Labs   Lab Test 09/12/19  0653 09/11/19  1151 09/11/19  0558    138 Canceled, Test credited   BUN 6* 7 Canceled, Test credited   CO2 23 24 Canceled, Test credited   CR 0.51* 0.52 Canceled, Test credited   GFRESTIMATED >90 >90 Canceled, Test credited       Hepatic Studies  Recent Labs   Lab Test 09/12/19  0653 09/11/19  1151 09/11/19  0558   BILITOTAL 0.8 1.2 Canceled, Test credited   ALKPHOS 704* 742* Canceled, Test credited   ALBUMIN 2.0* 2.0* Canceled, Test credited   AST 32 33 Canceled, Test credited   ALT 58* 60* Canceled, Test credited       Immunologlobulins  Recent Labs   Lab Test 01/03/13  1049   *   SED 11

## 2019-09-13 NOTE — PROGRESS NOTES
Home Infusion  Odalys is expected to discharge possibly as soon as today or tomorow and may need to go home on IV rocephin q 24 hrs.  She has done home IV therapy with [facility] before however it has been several years.  She has received any initial teaching by [facility].    Met with Odalys and her  Marcos at bedside and provided them with information about [facility] services.  Explained about administration method which will be via IVP, showed them the teaching materials and explained that a home nurse will provide the teaching needed for CG/self-administration.  Informed them about supplies and delivery of supplies, storage of medication, dosing time, plan for SNV and 24/7 availability of [facility] staff while on IV therapy.   [facility] will plan for Odalys to get her dose of rocephin at the hospital on day of discharge and then have a home nurse visit the following day.  Odalys and Marcos verbalized understanding of all information given.   They are willing and able to learn and manage home IV therapy.  Questions answered.  Will continue to follow until dc and update pt once discharge is confirmed.    [facility] Liaison  [phone]

## 2019-09-13 NOTE — PROGRESS NOTES
[facility]  Palliative Care Daily Progress Note       Recommendations & Counseling       Continue with current bowel regimen, discussed the importance of daily bowel regimen while in the hospital and at home due to the constipation affect of the opioids.       Goals remain restorative    Discussed code status today and recommended DNR. She will talk to Marcos. We clarified that this would be different in the immediate perioperative period.       Plan to follow with Palliative Care outpatient            Patient discussed with Dr. Victoria Gilliam.    Lore Morales, MS4  N     Assessments     Odalys Osmar Nathan is a 59 year old female with recurrent ovarian cancer (on Gemzar) admitted for worsening fatigue, abdominal pain, SOB, and increased weight loss. Additionally, she has had worsening headache over the last week. Imaging revealed a new R frontal brain metastasis, generally worsening metastatic disease, concerns for recurrent cholangitis, and new R hydronephrosis/hydroureter.  She was bacteremic, cultures negative since 9/11     Today, the patient was seen for:  Ovarian cancer  Brain metastasis  Poor Appetite  Abdominal pain  Goals of Care discussion    Prognosis, Goals, or Advance Care Planning was addressed today with: Yes.  Mood, coping, and/or meaning in the context of serious illness were addressed today: Yes.  Summary/Comments: Patient treatment focused, hoping to be discharged relatively soon. Patient states her mood is doing ok and she has had a lot of support from family, friends and her julius community.         Interval History:     Chart review/discussion with unit or clinical team members:   Chart reviewed, neurosurgery offered surgery pending 5 days of negative blood cultures and platelets over 100.    Per patient or family/caregivers today:  Patient stated she slept okay last night, she denied having any episodes of confusion.  However she endorsed getting her days and  "months mixed up, stating that she keeps thinking it is April. She minimized her pain, until her  said she has been in pain today, she stated she did not take her evening oxycodone last night and her pain was ok until this morning. She also has not had a BM in 2 days, as she was declining her bowel meds. She felt as if she had gotten things cleaned out and the medication was \"too much\". Patient was strongly encouraged to stay on her bowel regimen and we discussed the appropriate use of pain medications. Patient also states her appetite is about the same as days prior, she can only eat a few bites before she feels full.     Had conversation with patient about code status, Patient stated she has a lot of hopes for the future and being able to live an engaged life with her loved ones. In this context we recommended against resuscitative efforts. Patient was receptive to discussion, she would like to have a conversation with her  before making a decision about her code status.     Key Palliative Symptoms:  # Pain severity the last 12 hours: moderate  # Dyspnea severity the last 12 hours: none  # Nausea severity the last 12 hours: none  # Anxiety severity the last 12 hours: none    Patient is on opioids: assessed and bowels ok/no needed changes to plan of care today.           Review of Systems:     Besides above, an additional focused system ROS was reviewed and is unremarkable.          Medications:     I have reviewed this patient's medication profile and medications during this hospitalization.    Noted meds:    Decadron IV 4mg Q6H  Protonix 40mg IV daily  Miralax daily - Not taken for past 3 days  Senna 2 tabs BID - Declined morning dose for past 2 days     Noted PRN meds are:  Dilaudid IV 0.2mg   x0  Melatonin 1mg   Zofran IV or ODT   x0  Oxycodone PO 5-10mg   -10mg in 24hrs  Compazine PO, IV, [place]   x0           Physical Exam:   Temp: 96.1  F (35.6  C) Temp src: Oral BP: 123/73   Heart Rate: 61 Resp: 16 " SpO2: 98 % O2 Device: None (Room air)      Constitutional: alert, no distress and smiling  Head: No abnormalities noted.  EENT: Lids and lashes normal, sclera non-icteric. No abnormalities of ears, nose, or mouth noted.  Cardiovascular: RRR. No murmurs.  Respiratory: Lungs clear to auscultation bilaterally.  Gastrointestinal: Abdomen soft, mostly non-tender except for LLQ. BS normal.   Musculoskeletal: extremities normal- no gross deformities noted and normal muscle tone  Skin: no suspicious lesions or rashes  Neurologic: Appears grossly normal.   Psychiatric: mentation appears normal, affect normal/bright and judgment and insight intact.           Data Reviewed:     Reviewed recent pertinent imaging, comments:   Reviewed.    Reviewed recent labs, comments:   Cr 0.51  GFR >90  WBC 5.0  Hgb 10.6  Platelets 104   BCx with no growth on 9/9, 9/10.         Total time spent was 40 minutes,  >50% of time was spent counseling and/or coordination of care regarding treatment preferences.    Victoria Gilliam MD  Palliative Medicine  Pager [phone]

## 2019-09-13 NOTE — PROGRESS NOTES
"S: platelets increasing. No acute events     O:  Temp:  [95.9  F (35.5  C)-97.7  F (36.5  C)] 97.4  F (36.3  C)  Heart Rate:  [79-86] 79  Resp:  [16] 16  BP: (115-122)/(62-78) 122/78  SpO2:  [96 %-97 %] 97 %    Exam:  General: Awake;  Alert, In No Acute Distress  Pulm: Breathing Comfortably on room air  Cranial Nerves: Cranial Nerves II-XII Intact Bilaterally  Strength:      Del Tr Bi WE WF Gr  R 5 5 5 5 5 5  L 5 5 5 5 5 5     HF KE KF DF PF   R 5 5 5 4 5   L 5 5 5 5 5   Pronator Drift: Absent  Sensory: Intact to Light Touch    CBC: wbc 5, hgb 10, plt 104     Assessment:   Likely metastatic ovarian cancer to brain with ongoing medical comorbidities including thrombocytopenia, anemia, and bacteremia.     Recommendations:   -Criteria for surgery:   negative blood cultures for 5 days   -continue decadron at present dose of 4q6  -possible surgery, timing pending   -protonix while on decadron   -remainder of care per primary team     Cheng \"Brian\" MD Ileana   Neurosurgery, PGY-3    Please contact neurosurgery resident on call with questions.    Dial * * [phone], enter [phone] when prompted.     "

## 2019-09-13 NOTE — PROGRESS NOTES
Urology Progress Note    Interval History:  - Blood cultures positive for Klebsiella pneumonia, unclear etiology, ID is following, will need continued IV ceftriaxone.   - Patient is considering surgical resection of brain mets followed by gamma knife SRS of the surgical cavity, but this is dependent on resolution of sepsis and recovery of platelet counts.  Pt is a Jehovah Witness and accepts no blood products.     The patient continues to have vague abdominal and flank pains, R>L.  In general she is eating normally, but she does have infrequent nausea.  She is voiding normally (emptying easily, no incontinence, no dysuria) but her urine appears dark, perhaps because of poor PO fluids, she thinks.  Last UA on 9/10/19 was normal.  Creatinine remains at baseline.     Physical Exam  Temp:  [95.9  F (35.5  C)-97.7  F (36.5  C)] 97.4  F (36.3  C)  Heart Rate:  [79-86] 79  Resp:  [16] 16  BP: (115-122)/(62-78) 122/78  SpO2:  [96 %-97 %] 97 %    I/O last 3 completed shifts:  In: 820 [P.O.:720; I.V.:100]  Out: 1300 [Urine:1300]     Gen: NAD  Abd: Soft.  ND. + mild TTP LUQ and RUQ.  No palpable masses.  Midline incision well healed.   + mild soreness in BL CVA with palpation.  No CVAT with percussion.   Ext: NT, no LE edema    Labs reviewed/pending  Heme:  Recent Labs   Lab 09/13/19  0656 09/12/19  0653 09/11/19  1151 09/10/19  0632   WBC 5.0 5.1 5.6 4.1   HGB 10.6* 10.0* 10.3* 9.8*   * 80* 85* 86*     Chem:  Recent Labs   Lab 09/12/19  0653 09/11/19  1151 09/11/19  0558 09/10/19  1447 09/10/19  0632   POTASSIUM 3.5 4.4 Canceled, Test credited  3.9 3.8 3.0*   CR 0.51* 0.52 Canceled, Test credited  --  0.53       Assessment/Plan    Odalys Nathan is a 59 year old female with progressing metastatic ovarian cancer and new right hydronephrosis secondary to malignant obstruction. At this time patient is asymptomatic and her creatinine is baseline. We re-addressed indications for urinary drainage procedures - these  include pyelonephritis, pain, worsening kidney function or plans for potential oncology treatment options that may warrant intervention in order to protect renal function. We also dicussed options of urinary drainage including percutaneous nephrostomy tube vs ureteral stent placement. At this time, patient does not meet the need for emergent intervention .  She is contemplating brain surgery with subsequent radiotherapy over the next couple weeks, and down the road may have options for palliative systemic chemotherapy pending surgical recovery (per discussions with Gyne-Onc).  We discussed that it can be important to decompress the right kidney to fully preserve renal fxn to maximize chemo options, but right now her creatinine remains normal, likely because of her normal contralateral kidney.     For right now, she would like to take things one day at a time and chief concerns include infection (IV antibiotics), thrombocytopenia, and upcoming brain metastasectomy.  She does not wish to move forward with renal drainage procedures at this time.              Today:    Will schedule outpatient followup with a renal ultrasound in 2 weeks to readdress the hydronephrosis - given that patient is electing for aggressive palliative mgmt, maximal renal preservation may also make sense and should be an ongoing conversation.  In the meantime, please contact urology with concerns of pyelonephritis, increasing pain, worsened renal fxn, etc.     Patient seen by me.  Will discuss with Dr. Gan  Urology will sign off but remains available for questions/concerns.     Gwen Proctor PA-C  Urology Physician Assistant  Personal Pager: [phone]    Please call Job Code:   [phone] to reach the Urology resident or PA on call - Weekdays  [phone] to reach the Urology resident or PA on call - Weeknights and weekends

## 2019-09-13 NOTE — PLAN OF CARE
VSS. Pt A&Ox4, forgetful at times. Bed alarm on while family not present. Pain controlled with oxycodone X1. Tolerating regular diet. Voids spont with adequate UOP. Passing gas, no BM. Port hep locked between IV ABX. Cont. POC.

## 2019-09-13 NOTE — PROGRESS NOTES
Gynecology Oncology Progress Note  September 13, 2019    Ms. Odalys Nathan is a 59 year old HD# 4 admitted with new brain metastasis and bacteremia.    Dz: Recurrent ovarian cancer. Most recently 3C single-agent paclitaxel (last 6/20/19) followed by Gemzar C2D8 on 9/5. CT C/A/P 9/9: Inc thoracic and abdominal adenopathy, pleural carcinomatosis and BL effusions, increase in liver mass and infiltration of edmundo hepatis with worsening ascites, right hydronephrosis/hydroureter and CT brain with new brain mass.    24 hour events:   - Neurosurgery offered patient surgery followed by sterotactic radiosurgery, if platelets improved and blood cultures negative for 5 days.   - Repeat MRI brain. Radiation Oncology planning Gamma Knife SRS following surgery.    Subjective: Odalys is doing well this morning. She says her pain is well controlled. She is tolerating PO without nausea or vomiting. She is voiding, ambulating, resting comfortably.     Objective:   Vitals:    09/12/19 0600 09/12/19 1234 09/12/19 1959 09/12/19 2305   BP: 121/73 115/64 121/62 115/72   BP Location: Right arm Right arm  Right arm   Pulse:       Resp: 15 16  16   Temp: 96.5  F (35.8  C) 95.9  F (35.5  C) 97.7  F (36.5  C) 97.1  F (36.2  C)   TempSrc: Oral Axillary Oral Oral   SpO2: 95% 96% 97% 97%   Weight:         General: Chronically ill appearing female, in NAD  CV: RRR  Resp: CTAB, no increased work of breathing  Abdomen: soft, mildly tender, moderately distended, no rebound or guarding. Bowel sounds present  Extremities: nontender, 1+ edema  Neuro/Psych: Alert and oriented. Answers questions appropriately. Normal affect.  Lines/Drains: Port    I/Os  (Yesterday // Since Midnight)  IV: 100 mL // NR  PO: 720 mL // NR  Urine 1200 ml // 700 ml    New labs/imaging-  Repeat labs and blood cultures    Previous labs/imaging:  Hemoglobin 10.0, Platelets 80, WBC 5.1  Cr 0.51, AST 32, ALT 58, Alk Phos 704, Tbili 0.8    Blood cultures 9/9 (port), 9/10  (port): Klebsiella pneumoniae, resistant to ampicillin  Blood culture 9/11 (port): NG after 2 days  Blood culture 9/12 (port): NG after 19 hours; (peripheral): NG after 19 hours    Urine culture 9/10: No growth     MRI Brain 9/12:  Findings: Limited imaging for stereotactic imaging demonstrates a  peripherally enhancing mass centered in the right frontal lobe  measuring approximately 5.5 x 4.6 x 4.8 cm. No hydrocephalus. The  major intracranial arterial structures are grossly patent.    Assessment: 59 year old HD#4 admitted with abdominal pain and concern for possible cholangitis now with bacteremia, and headache with new brain mass/mass effect.     Dz: Recurrent ovarian cancer. Dx 2012 s/p debulking, s/p multiple chemo cycles (carbo/taxol, carbo/doxil, IV/IP, avastin/cytoxan, 15C niraparib), interval debulking in 2014 with recurrence in 2015. Most recently 3C single-agent paclitaxel (last 6/20/19) followed by Gemzar C2D8 on 9/5. CT C/A/P 9/9: Inc thoracic and abdominal adenopathy, pleural carcinomatosis and BL effusions, increase in liver mass and infiltration of portal hepatis with worsening ascites, right hydronephrosis/hydroureter.  FEN: Regular, HypoNa, corrected. HypoK>ERP, HypoPh>ERP  Pain: PO oxycodone, IV dilaudid PRN  Heme: Hgb 9.7>10, Plt 104>80; Religion (lab to draw minimal volume)  CV: Hypotension, BP improved after IVF bolus  Pulm: H/o DVT/PE 2012, no current anticoagulation. CT Chest 9/9 neg for PE. Progression of disease with R>L pleural effusions.  GI: ERCP and stent placement 8/23 due to cholecystitis and biliary stricture secondary to disease. CT A/P 9/9 increased pneumobilia, increased biliary duct dilation, possible cholangitis. Worsening edmundo-hepatis. Increased ascites. Tbili 1.4>2.0>0.8, AlkPhos 738>704, ALT 67>58, AST 44>32. GI consulted and does not think repeat ERCP would help, especially in context of other problems. 2D IV zosyn>D#3 IV ceftriaxone. Appreciated ID recommendations  for antibiotic selection, course. Course to be determined pending daily blood cultures. Protonix IV daily while on decadron.  : Right moderate hydronephrosis, hydroureter with adjacent disease. Cr 0.56. Urine cultures pending. Urology may place stent if Cr rises with worsening pain.  ID: blood culture from port on 9/9 & 9/10 growing klebsiella pneumoniae, susceptible to ceftriaxone. Cultures 9/11, 9/12 with no growth. Repeat daily cultures until negative. ID consulted, antibiotics as above. Afebrile, WBC 5.6, lactate 1.0, procal 5.98.  Endo: Hypothyroidism (synthroid)  Psych/Neuro/MSK: New  5.6 cm brain mass on CT/MRI, R anterior frontal lobe, central necrosis, intralesional hemorrhage, moderate vasogenic edema, mass effect on R cerebrum, midline shift, mild subfalcine herniation. Headache, no focal deficits. Episode of confusion/disorientation 9/10, intermittently confused and disoriented, but reorients. Neurosurgery consulted and initially did not recommend surgery at this time given JW with low counts and does not agree to transfusion, but then reconsidered and offered patient surgery if her counts improve and cultures are sterile for 5 days. Radiation Oncology consulted and recommended fractionated gamma knife SRS. As patient desires surgery, post-surgical SRS will be performed. Dexamethasone 4mg IV q6h.   PPX: Protonix, SCDs, IS  Dispo: Pending workup.  Drains/Lines: Port  Consults: Neurosurgery, GI, urology, palliative, Rad Onc, ID    Rafy Santos MD, MPH  Gynecologic Oncology, PGY-2  September 13, 2019 , 6:50 AM    Pager [phone]    Provider Disclosure:   I agree with above History, Review of Systems, Physical exam and Plan. I have reviewed the content of the documentation and have edited it as needed. I have seen and personally performed the services documented here and the documentation accurately represents those services and the decisions I have made.     Electronically signed by:   Garry ROSADO  Gilson VITALE   Gynecologic Oncology   [facility]

## 2019-09-13 NOTE — PROGRESS NOTES
Gastroenterology Inpatient Sign Off Note    Inpatient GI consults service will sign off, patient LFTs improved and no further signs of infection. Neurosurgery planning on surgery for brain mass. Discussed case with Dr. Lloyd and we will contact patient to schedule future ERCP to remove stents. No further recommendations at this time. If primary team has addition questions, please page consult fellow listed in Amion.    Current GI Consult Staff: Dr. Londono      Follow up recommendations:   As above       Vasile Spicer MD  GI Fellow   [phone]

## 2019-09-13 NOTE — PLAN OF CARE
Alert and oriented x4, forgetful, ambulating to bathroom with sba. Bed alarm on when family not present. C/O abdominal pain, relieved with Oxycodone, tolerating regular diet. Possible discharge this evening, patient will discharge with home IV antibiotics.

## 2019-09-13 NOTE — PLAN OF CARE
VSS. A x O, but forgetful at times. Bed alarm on for safety. Denies pain and nausea. Tolerating regular diet. Voiding adequately. Passing gas, no BM. Port hep locked between IV ABX. Cont. POC.

## 2019-09-14 NOTE — PROGRESS NOTES
[facility]  Palliative Care Daily Progress Note       Recommendations & Counseling       Provided a POLST form and discussed this. Odalys didn't feel ready to sign this today.     Odalys is aware of our clinic and will see our team there after the surgery.     Victoria Gilliam  Pager: [phone]  [facility] Inpatient Team Consult pager [phone] (M-F 8-4:30)  After-hours Answering Service [phone]  Palliative Clinic: [phone]        Total time spent was 35 minutes, >50% of time was spent counseling and/or coordination of care regarding treatment preferences.     Assessments          Odalys Nathan is a 59 year old female with recurrent ovarian cancer (on Gemzar) admitted for worsening fatigue, abdominal pain, SOB, and increased weight loss. Additionally, she has had worsening headache over the last week. Imaging revealed a new R frontal brain metastasis, generally worsening metastatic disease, concerns for recurrent cholangitis, and new R hydronephrosis/hydroureter.  She was bacteremic, cultures negative since 9/11.  Her situation is complicated by her beliefs as a Jehova's Witness. Currently the plan is for a resection of her brain met by [facility] on 9/18. She will return home meanwhile.     Goals: Odalys states that she is open to all measures that carry a reasonable hope to get her to an acceptable quality of life. She would not want to live dependent on machines and she would not want to undergo any treatment or intervention with no realistic chance of benefit. We discuss intubation and CPR in more detail. Marcos and Odalys feel that intubation would be a reasonable intervention and Marcos feels prepared to make a decision as to at which point Odalys wouldn't want to continue restorative measures, but focus her cares on comfort.   I restate my recommendation for DNR considering her goals. Marcos and Odalys express agreement. However, they don't feel ready to have that  change made in her orders or complete a POLST.     Today, the patient was seen for:  Metastatic ovarian cancer  Brain metastasis    Prognosis, Goals, or Advance Care Planning was addressed today with: Yes    Mood, coping, and/or meaning in the context of serious illness were addressed today: No            Interval History:     Chart review/discussion with unit or clinical team members:   No events overnight.     Per patient or family/caregivers today:  She is feeling ok today. She hasn't had a BM yet, but increased her Senna use    Key Palliative Symptoms:  # Pain severity the last 12 hours: low  # Dyspnea severity the last 12 hours: none  # Nausea severity the last 12 hours: none  # Anxiety severity the last 12 hours: none    Patient is on opioids: assessed and I made recommendations about bowel care as above.           Review of Systems:     Besides above, an additional system ROS was reviewed and is unremarkable          Medications:     I have reviewed this patient's medication profile and medications during this hospitalization.    Noted meds:    Decadron IV 4mg Q12H  Protonix 40mg IV daily  Miralax daily, took today's   Senna 2 tabs BID - 2 tabs in pm, 1 this am     Noted PRN meds are:  Dilaudid IV 0.2mg   x0  Melatonin 1mg   Zofran IV or ODT   x0  Oxycodone PO 5-10mg   -10mg in 24hrs  Compazine PO, IV, AR   x0           Physical Exam:   Vitals were reviewed  Temp: 97.1  F (36.2  C) Temp src: Oral BP: 120/71 Pulse: 66 Heart Rate: 78 Resp: 18 SpO2: 97 % O2 Device: None (Room air)      CONSTIT: awake, appears comfortable  EENT: MMM, EOMI, no icterus  RESP: reg, nl effort  MSK:moves x4, nl gait  SKIN:  warm, no rash, no obvious lesions  NEURO: alert, oriented x3  PSYCH: appropriate affect, memory and thought process intact               Data Reviewed:     Reviewed recent pertinent imaging, comments:       Reviewed recent labs, comments:         Victoria Gilliam MD  Palliative Medicine  Pager [phone]

## 2019-09-14 NOTE — PLAN OF CARE
VSS on room air, patient can be forgetful at time. Denies headache. Pain controlled with oxy during shift. K+ replaced, Phos is currently infusing, recheck in the AM. On reg diet, denied N/V. Passing gas, no BM during shift. Voiding spontaneously. Ambulating to the bathroom with SBA, Neuro check q 4hrs. On a bed alarm. Port currently infusing. Slept all night. Continue with plan of care.

## 2019-09-14 NOTE — PLAN OF CARE
VSS on RA. SBA. Neuros intact, forgetful. Pain managed with 5 mg oxycodone given x 1. Tolerating regular diet. No nausea. Passing gas. No BM today.  Voiding spontaneously.  Discharge instructions reviewed with patient and spouse.  Discharge meds filled at  pharmacy.  Pt. Needs to have albuterol refill from pharmacy before discharge. Port to be heparin locked prior to discharge as patient will be receiving IV Abx at home.  home infusion scheduled to see patient tomorrow.  All belongings returned to patient.

## 2019-09-14 NOTE — PLAN OF CARE
VSS. Pt reports abdominal pain, controlled with Oxy x1. Port hep locked, Mag replaced x1 (critically low - MD notified), still needs K+ & Phos replacement. Neuro checks intact, besides disoriented to time intermittently & forgetful, denies HA.Tolerating reg diet, BS hypo. Constipation: no BM since 9/10, but pt reports passing gas. Pt up with SBA, amb in martinez x1, alarm on, voiding adequately. Multiple family @ bedside most of the evening, supportive & involved in care. Possible D/C home tomorrow after IV antibiotics.

## 2019-09-14 NOTE — PROGRESS NOTES
Gynecology Oncology Progress Note  September 14, 2019    Ms. Odalys Nathan is a 59 year old HD#5 admitted with new brain metastasis and bacteremia.    Dz: Recurrent ovarian cancer. Most recently 3C single-agent paclitaxel (last 6/20/19) followed by Gemzar C2D8 on 9/5. CT C/A/P 9/9: Inc thoracic and abdominal adenopathy, pleural carcinomatosis and BL effusions, increase in liver mass and infiltration of edmundo hepatis with worsening ascites, right hydronephrosis/hydroureter and CT brain with new brain mass.    24 hour events:   - Low magnesium, phosphate, potassium; ERP initiated    Subjective: Odalys is doing well this morning. Having a mild amount of abdominal pain. Denies nausea or vomiting. Tolerating PO. Ambulating without difficulty. Voiding adequately. No headaches, vision changes, chest pain, shortness of breath.     Objective:   Vitals:    09/13/19 1646 09/13/19 1954 09/14/19 0015 09/14/19 0345   BP: (!) 142/72 105/55 116/60 105/63   BP Location: Right arm Right arm Right arm Right arm   Pulse:   80 78   Resp: 16 18 16 16   Temp: 96.4  F (35.8  C) 97.5  F (36.4  C) 96.5  F (35.8  C) 96.9  F (36.1  C)   TempSrc: Oral Oral Oral Oral   SpO2: 97% 98% 97% 97%   Weight:         General: NAD  CV: RRR  Resp: CTAB, no increased work of breathing  Abdomen: Soft, mildly tender, moderately distended, no rebound or guarding.   Extremities: nontender, 1+ edema  Neuro/Psych: Alert and oriented. Answers questions appropriately. Normal affect.    Lines/Drains: Port    I/Os  (Yesterday // Since Midnight)  IV: 100 mL // NR  PO: 1320 mL // 240 ml  Urine 1125 ml // 700 ml    Previous labs/imaging:  Hemoglobin 10.0, Platelets 80, WBC 5.1  Cr 0.51, AST 32, ALT 58, Alk Phos 704, Tbili 0.8    Blood cultures 9/9 (port), 9/10 (port): Klebsiella pneumoniae, resistant to ampicillin  Blood culture 9/11 (port): NG after 2 days  Blood culture 9/12 (port): NG after 19 hours; (peripheral): NG after 19 hours    Urine culture 9/10: No  growth     MRI Brain 9/12:  Findings: Limited imaging for stereotactic imaging demonstrates a  peripherally enhancing mass centered in the right frontal lobe  measuring approximately 5.5 x 4.6 x 4.8 cm. No hydrocephalus. The  major intracranial arterial structures are grossly patent.    Assessment: 59 year old HD#5 admitted with abdominal pain and concern for possible cholangitis now with bacteremia, and headache with new brain mass/mass effect.     Dz: Recurrent ovarian cancer. Dx 2012 s/p debulking, s/p multiple chemo cycles (carbo/taxol, carbo/doxil, IV/IP, avastin/cytoxan, 15C niraparib), interval debulking in 2014 with recurrence in 2015. Most recently 3C single-agent paclitaxel (last 6/20/19) followed by Gemzar C2D8 on 9/5. CT C/A/P 9/9: Inc thoracic and abdominal adenopathy, pleural carcinomatosis and BL effusions, increase in liver mass and infiltration of portal hepatis with worsening ascites, right hydronephrosis/hydroureter.  FEN: Regular, HypoNa, corrected. HypoK>ERP, HypoPh>ERP, HypoMag>ERP  Pain: PO oxycodone, IV dilaudid PRN  Heme: Hgb 9.7>10>10.6, Plt 104>80>104; Confucianist (lab to draw minimal volume)  CV: Hypotension, BP improved after IVF bolus  Pulm: H/o DVT/PE 2012, no current anticoagulation. CT Chest 9/9 neg for PE. Progression of disease with R>L pleural effusions.  GI: ERCP and stent placement 8/23 due to cholecystitis and biliary stricture secondary to disease. CT A/P 9/9 increased pneumobilia, increased biliary duct dilation, possible cholangitis. Worsening edmundo-hepatis. Increased ascites. Tbili 1.4>2.0>0.8, AlkPhos 738>704, ALT 67>58, AST 44>32. GI consulted and does not think repeat ERCP would help, especially in context of other problems. 2D IV zosyn>D#4 IV ceftriaxone. Appreciated ID recommendations for antibiotic selection, course. Course to be determined pending daily blood cultures. Protonix IV daily while on decadron.  : Right moderate hydronephrosis, hydroureter with  adjacent disease. Cr 0.56. Urine cultures pending. Urology may place stent if Cr rises with worsening pain.  ID: blood culture from port on 9/9 & 9/10 growing klebsiella pneumoniae, susceptible to ceftriaxone. Cultures 9/11, 9/12 with no growth. Repeat daily cultures until negative. ID consulted, antibiotics as above. Afebrile, WBC 5.6, lactate 1.0, procal 5.98.  Endo: Hypothyroidism (synthroid)  Psych/Neuro/MSK: New  5.6 cm brain mass on CT/MRI, R anterior frontal lobe, central necrosis, intralesional hemorrhage, moderate vasogenic edema, mass effect on R cerebrum, midline shift, mild subfalcine herniation. Headache, no focal deficits. Episode of confusion/disorientation 9/10, intermittently confused and disoriented, but reorients. Neurosurgery consulted and initially did not recommend surgery at this time given JW with low counts and does not agree to transfusion, but then reconsidered and offered patient surgery if her counts improve and cultures are sterile for 5 days. Radiation Oncology consulted and recommended fractionated gamma knife SRS. As patient desires surgery, post-surgical SRS will be performed. Dexamethasone 4mg IV q6h.   PPX: Protonix, SCDs, IS  Dispo: Likely discharge to home today.   Drains/Lines: Port  Consults: Neurosurgery, GI, urology, palliative, Rad Onc, ID    Korey Lopez MD  OB/GYN Resident, PGY-2  9/14/2019 6:50 AM       Provider Disclosure:   I agree with above History, Review of Systems, Physical exam and Plan. I have reviewed the content of the documentation and have edited it as needed. I have personally performed the services documented here and the documentation accurately represents those services and the decisions I have made.     Electronically signed by:   Rosmery Aranda MD   Gynecologic Oncology   [facility]

## 2019-09-14 NOTE — PROGRESS NOTES
Port blood return obtained after one dose of alteplase.  IV abx given, port flushed with saline and heparin, remained accessed.  Home infusion to see patient at home tomorrow for IV abx..  Meds to discharge pharmacy.  Discharge to home with  at 1715.  Wheelchair to door.

## 2019-09-14 NOTE — PROGRESS NOTES
"Neurosurgery brief note:     Discussed with the patient that we would proceed with surgery after normalization of platelet and stabilization of ongoing sepsis. The patient is scheduled with Dr. Dewey of Neurosurgery for a right frontal craniotomy for tumor resection on September 18, 2019.     We discussed the risks, benefits, and alternatives of surgery followed by stereotactic radiosurgery with the patient and her .     I verbally discussed our new recommendation of 4 mg bid decadron with the Gynecology/Oncology team to decrease the patient's steroid-induced immunosuppression while attempting to treat her cerebral edema. I discussed they should discharge the patient with protonix.     The patient is ok to discharge and present electively for surgery from a neurosurgical perspective. The patient should discharge pending clearance and plans from the urology, gastroenterology, and infectious disease teams as well.     She should present to the Emergency Department for the following: if she has temperature > 101.5 degrees Farenheit, changes in neurologic status (such as headache, lightheadedness, dizziness, confusion, or numbness, tingling, or weakness in your face, arms, or legs) or with other concerns.     Discussed with Dr. Jack Leschke.     Prosper \"Brian\" MD [place]   Neurosurgery, PGY-3    Please contact neurosurgery resident on call with questions.    Dial * * [phone], enter [phone] when prompted.     I have reviewed the history above and agree with the resident's assessment and plan.  GRETTA Staley MD    "

## 2019-09-16 NOTE — PROGRESS NOTES
This is a recent snapshot of the patient's [facility] medical record.  For current drug dose and complete information and questions, call [phone] or In B[facility] pool, fv home infusion ([id number])  CSN Number:  [id number]

## 2019-09-16 NOTE — PROGRESS NOTES
This is a recent snapshot of the patient's [facility] medical record.  For current drug dose and complete information and questions, call [phone] or In Basket pool, fv home infusion ([id number])  CSN Number:  [id number]

## 2019-09-16 NOTE — TELEPHONE ENCOUNTER
Chief Complaint : New-From Hospital     Hx/Sx: Hydronephrosis    Records/Orders: US Needed    Pt Contacted: n/a    At Rooming: Normal

## 2019-09-16 NOTE — PROGRESS NOTES
Infusion Nursing Note:  Odalys Nathan presents today for Blood cultures.    Patient seen by provider today: No   present during visit today: Not Applicable.    Note: Blood cultures drawn before scheduled surgery Wednesday.    Pt arrived with port accessed as she is doing home infusions. Left accessed upon discharge.    Intravenous Access:  Lab draw site R AC, Needle type butterfly, Gauge 21- second set.  Implanted Port- first set.    Treatment Conditions:  Not Applicable.    Post Infusion Assessment:  Patient tolerated blood draw without incident.     Discharge Plan:   Discharge instructions reviewed with: Patient.  Patient and/or family verbalized understanding of discharge instructions and all questions answered.  AVS to patient via [employer].  Patient will return 9/26 for next appointment.   Patient discharged in stable condition accompanied by: .  Departure Mode: Ambulatory.    Elly Mcfadden RN

## 2019-09-17 NOTE — ANESTHESIA PREPROCEDURE EVALUATION
Anesthesia Pre-Procedure Evaluation    Patient: Odalys Nathan   MRN:     [id number] Gender:   female   Age:    59 year old :      1960        Preoperative Diagnosis: Brain Tumor   Procedure(s):  Stealth Assisted Right Craniotomy And Tumor Resection     Past Medical History:   Diagnosis Date     Antiplatelet or antithrombotic long-term use      Ascites      Blood clot in the legs      Diabetes (H)      Ovarian cancer (H)     serous,stg IV     Pleural effusion      Pulmonary embolism (H) 2012     Refusal of blood transfusions as patient is Catholic      Short gut syndrome      Subclinical hypothyroidism 2013     Thrombosis of leg       Past Surgical History:   Procedure Laterality Date     COLECTOMY       COLONOSCOPY  2012    Procedure:COLONOSCOPY; With Biopsy; Surgeon:TONI SULLIVAN; [facility]     ENDOSCOPIC RETROGRADE CHOLANGIOPANCREATOGRAM N/A 2019    Procedure: Endoscopic Retrograde Cholangiopancreatogram with 4 mm Hurricane Balloon Dilation, gallbladder stent and Bile Duct stent placements, Bilarry Sphincterotomy ;  Surgeon: Catrachito Lloyd MD;  Location: [facility] OR     HYSTERECTOMY TOTAL ABD, RHIANNA SALPINGO-OOPHORECTOMY, NODE DISSECTION, TUMOR DEBULKING, COMBINED  2012    Procedure:COMBINED HYSTERECTOMY TOTAL ABDOMINAL, BILATERAL SALPINGO-OOPHORECTOMY, NODE DISSECTION, TUMOR DEBULKING;  Exploratory Laparotomy, Total Abdominal Hysterectomy, Bilateral Salpingo-Oophorectomy, appendectomy,lysis of adhesions, ileal, ascending, transverse and splenic flexure resection, ileal descending bowel renanastomosis, incidental cystotomy repair, CUSA procedure and colonoscopy ; Curtis     INSERT PORT PERITONEAL ACCESS  4/3/2012    Procedure:INSERT PORT PERITONEAL ACCESS; Intraperitoneal Port Placement (c-arm); Surgeon:SAMUEL CARRASCO; [facility] OR     INSERT PORT PERITONEAL ACCESS  2014    Procedure: INSERT PORT PERITONEAL ACCESS;  Surgeon: Nga Yeung MD;  Location:  [facility] OR     INSERT PORT VASCULAR ACCESS       LAPAROSCOPY DIAGNOSTIC (GYN)  5/14/2014    Procedure: LAPAROSCOPY DIAGNOSTIC (GYN);  Surgeon: Nga Yeung MD;  [facility]: [facility] OR     LAPAROTOMY EXPLORATORY Right 11/25/2015    Procedure: LAPAROTOMY EXPLORATORY;  Surgeon: Nga Yeung MD;  [facility]     LAPAROTOMY, TUMOR DEBULKING, COMBINED  5/14/2014    Procedure: COMBINED LAPAROTOMY, TUMOR DEBULKING;  Surgeon: Nga Yeung MD;  [facility]     REMOVE CATHETER PERITONEAL N/A 8/20/2014    Procedure: REMOVE CATHETER PERITONEAL;  Surgeon: Nga Yeung MD;  [facility]: [facility] OR     VASCULAR SURGERY      stent left iliac vein          Anesthesia Evaluation     . Pt has had prior anesthetic. Type: General and MAC    No history of anesthetic complications          ROS/MED HX    ENT/Pulmonary: Comment: 7/11/19 CT Chest: There is continued increased adenopathy left supraclavicular  region measuring 2.3 x 2.1 cm, previously 2.2 x 1.7 cm. Right chest  port remains in good position with catheter tip in the distal SVC.  Calcified granuloma is noted in the lateral right middle lobe near the  major fissure on series 6, image 141. The lungs are otherwise clear.  No infiltrate, consolidation or mass. Esophagus is unremarkable.  Thyroid gland appears normal where imaged. Periesophageal adenopathy  in the mediastinum has slightly progressed in the interval since prior  exam. Right hilar lymph node is also slightly larger now measuring 1.4  x 1.0 cm on image 25 of series 2, previously 1.1 x 0.7 cm. A new  paraesophageal node on the same image now measures 2.0 x 1.2 cm. No  enlarged axillary lymph nodes. Right retrocrural lymph node on series  2, image 48 now appears partially calcified possibly related to  changes from interval therapy and measures 1.6 x 1.3 cm, increased  since the previously measured 1.2 x 0.9 cm.      Neurologic:  - neg neurologic ROS     Cardiovascular:     (+) ----. : . . . :. .  Previous cardiac testing Echodate:9/14/17 MUGAresults:1. Left ventricular ejection fraction is 54%. On the prior study from  7/11/2013 this was 66.4%.  2. Normal appearing left ventricular wall motion.date: results: date: results: date: results:          METS/Exercise Tolerance:     Hematologic:     (+) History of blood clots (2012) pt is not anticoagulated, Anemia, Other Hematologic Disorder-chemo induced neutropenia      Musculoskeletal:         GI/Hepatic: Comment: Short gut syndrome    (+) cholecystitis/cholelithiasis,       Renal/Genitourinary:         Endo:     (+) thyroid problem (subclinical ) hypothyroidism, .      Psychiatric:  - neg psychiatric ROS       Infectious Disease:         Malignancy:   (+) Malignancy History of Other  Other CA ovarian CA w/ mets-->liver Active status post Surgery and Chemo         Other: Comment: Malnutrition   Jahova's witness   (+) H/O Chronic Pain,H/O chronic opiod use ,                    JZG FV AN PHYSICAL EXAM    LABS:  CBC:   Lab Results   Component Value Date    WBC 8.9 09/17/2019    WBC 5.0 09/13/2019    HGB 10.4 (L) 09/17/2019    HGB 10.6 (L) 09/13/2019    HCT 31.9 (L) 09/17/2019    HCT 33.3 (L) 09/13/2019     09/17/2019     (L) 09/13/2019     BMP:   Lab Results   Component Value Date     09/12/2019     09/11/2019    POTASSIUM 4.5 09/14/2019    POTASSIUM 3.3 (L) 09/13/2019    CHLORIDE 105 09/12/2019    CHLORIDE 106 09/11/2019    CO2 23 09/12/2019    CO2 24 09/11/2019    BUN 10 09/17/2019    BUN 6 (L) 09/12/2019    CR 0.57 09/17/2019    CR 0.51 (L) 09/12/2019     (H) 09/12/2019     (H) 09/11/2019     COAGS:   Lab Results   Component Value Date    PTT 34 09/09/2019    INR 1.23 (H) 09/09/2019     POC:   Lab Results   Component Value Date     (H) 09/12/2019    HCG Negative 08/20/2014     OTHER:   Lab Results   Component Value Date    LACT 0.9 09/09/2019    JOSE ALBERTO 8.6 09/12/2019    PHOS 4.9 (H) 09/14/2019    MAG 1.8 09/14/2019     "ALBUMIN 2.0 (L) 09/12/2019    PROTTOTAL 5.9 (L) 09/12/2019    ALT 58 (H) 09/12/2019    AST 29 09/17/2019    GGT 21 08/10/2017    ALKPHOS 704 (H) 09/12/2019    BILITOTAL 0.8 09/12/2019    LIPASE 52 (L) 09/09/2019    AMYLASE 22 (L) 08/23/2019    TSH 5.83 (H) 01/03/2013    T4 0.84 01/03/2013    CRP 53.9 (H) 09/17/2019    SED 42 (H) 09/17/2019        Preop Vitals    BP Readings from Last 3 Encounters:   09/14/19 123/87   09/09/19 102/71   09/05/19 105/71    Pulse Readings from Last 3 Encounters:   09/14/19 66   09/09/19 94   09/05/19 90      Resp Readings from Last 3 Encounters:   09/14/19 18   09/05/19 18   08/29/19 16    SpO2 Readings from Last 3 Encounters:   09/14/19 97%   09/09/19 96%   09/05/19 98%      Temp Readings from Last 1 Encounters:   09/14/19 36.6  C (97.9  F) (Oral)    Ht Readings from Last 1 Encounters:   08/29/19 1.651 m (5' 5\")      Wt Readings from Last 1 Encounters:   09/11/19 60.9 kg (134 lb 3.2 oz)    Estimated body mass index is 22.33 kg/m  as calculated from the following:    Height as of 8/29/19: 1.651 m (5' 5\").    Weight as of 9/11/19: 60.9 kg (134 lb 3.2 oz).     LDA:  Port A Cath Single 08/20/14 Right Chest wall (Active)   Number of days: 1854        Assessment:   ASA SCORE: 3    H&P: History and physical reviewed and following examination, relevant changes include:    NPO Status: NPO Appropriate     Plan:   Anes. Type:  General   Pre-Medication: None   Induction:  IV (Standard)   Airway: ETT; Oral   Access/Monitoring: PIV; A-Line; 2nd PIV   Maintenance: Balanced     Postop Plan:   Postop Pain: Opioids  Postop Sedation/Airway: Not planned     PONV Management:   Adult Risk Factors: Female, Postop Opioids   Prevention: Ondansetron, Dexamethasone     CONSENT: Direct conversation   Plan and risks discussed with: Patient   Blood Products: Consent Deferred (Minimal Blood Loss)       Comments for Plan/Consent:  Plan:  GA with ETT, routine monitors, arterial line, 2 PIV.  2 gm magnesium sulfate " intraoperatively    MD Eric Fuchs MD

## 2019-09-17 NOTE — PROGRESS NOTES
This is a recent snapshot of the patient's [facility] medical record.  For current drug dose and complete information and questions, call [phone] or In [facility] pool, fv home infusion ([id number])  CSN Number:  [id number]

## 2019-09-18 PROBLEM — D49.6 BRAIN TUMOR (H): Status: ACTIVE | Noted: 2019-01-01

## 2019-09-18 NOTE — CONSULTS
[facility] SERVICE CONSULTATION     Patient:  Odalys Nathan   Date of birth 1960, Medical record number [id number]  Date of Visit:  09/18/2019  Date of Admission: 9/18/2019  Consult Requester:Bertin Dewey, *            Assessment and Recommendations:   PROBLEM LIST:  1. Klebsiella pneumoniae  2. ovarian cancer (on Gemzar)         -s/p colon resection in 2012         -ERCP with stent placement on 8/23/2019 for worsening disease causing                        obstruction/chronic cholecystitis and biliary stricture        -mass lesion in the right anterior frontal lobe with surrounding vasogenic edema, most            suggestive of intracranial metastatic disease, new since 8/3/2016. S/p tumor resection           on 9/18        -New moderate right hydronephrosis and hydroureter. The right ureter appears potentially       tethered to new enhancing tissue associated with the right ovarian vein concerning for            focal worsening metastatic disease.  3.Mandaeism    RECOMMENDATION:  1. Continue Ceftriaxone 2 grams IV Daily     ASSESSMENT:  Mrs. Odalys Nathan is a 59 year old Mandaeism female, with pmhx of recurrent ovarian cancer (on Gemzar, 2nd/recent last dose in 8/29/2019, originally diagnosed in 2012) s/p colon resection in 2012, presented to the ED 9/9 for worsening fatigue, abdominal pain, SOB, weight loss of 25 lbs, and headaches for the past 3-4 weeks, found to have Klebsiella pneumoniae bacteremia. Discharged on 9/14 on Ceftriaxone and returns today for tumor resection of the mass lesion in the right anterior frontal lobe.     [facility]   Infections Diseases   [id number]  ________________________________________________________________    Consult Question:.  Admission Diagnosis: Brain Tumor  Brain tumor (H)         History of Present Illness:     Mrs. Odalys Nathan is a 59 year old Mandaeism female, with pmhx of recurrent ovarian cancer (on Gemzar, 2nd/recent  last dose in 8/29/2019, originally diagnosed in 2012) s/p colon resection in 2012, who had a recent admission from 9/9-9/18 with worsening fatigue, abdominal pain, SOB, weight loss of 25 lbs, and headaches for the past 3-4 weeks. During that admission she was found to have a new metastatic lesion in the right anterior frontal lobe and Klebsiella bacteremia. She was started on Ceftriaxone and discharged home. She presented on 9/17 to undergo surgical resection of the brain mass.     She is now post op from a stealth assisted right craniotomy and tumor resection. She is awake, alert. She denies any fevers, chills. No complications from the Ceftriaxone infusion. No significant abdominal pain.           Review of Systems:   CONSTITUTIONAL:  No fevers or chills  EYES: negative for icterus  ENT:  negative for hearing loss, tinnitus and sore throat  RESPIRATORY:  negative for cough with sputum and dyspnea  CARDIOVASCULAR:  negative for chest pain, dyspnea  GASTROINTESTINAL:  negative for nausea, vomiting, diarrhea and constipation  GENITOURINARY:  negative for dysuria  HEME:  No easy bruising  INTEGUMENT:  negative for rash and pruritus  NEURO:  Negative for headache         Past Medical History:     Past Medical History:   Diagnosis Date     Antiplatelet or antithrombotic long-term use      Ascites      Blood clot in the legs      Diabetes (H)      Ovarian cancer (H)     serous,stg IV     Pleural effusion      Pulmonary embolism (H) 2/2012     Refusal of blood transfusions as patient is Hindu      Short gut syndrome      Subclinical hypothyroidism 4/18/2013     Thrombosis of leg             Past Surgical History:     Past Surgical History:   Procedure Laterality Date     COLECTOMY       COLONOSCOPY  2/1/2012    Procedure:COLONOSCOPY; With Biopsy; Surgeon:TONI SULLIVAN; [facility]     ENDOSCOPIC RETROGRADE CHOLANGIOPANCREATOGRAM N/A 8/23/2019    Procedure: Endoscopic Retrograde Cholangiopancreatogram  with 4 mm Hurricane Balloon Dilation, gallbladder stent and Bile Duct stent placements, Bilarry Sphincterotomy ;  Surgeon: Catrachito Lloyd MD;  [facility]: [facility] OR     HYSTERECTOMY TOTAL ABD, RHIANNA SALPINGO-OOPHORECTOMY, NODE DISSECTION, TUMOR DEBULKING, COMBINED  2/1/2012    Procedure:COMBINED HYSTERECTOMY TOTAL ABDOMINAL, BILATERAL SALPINGO-OOPHORECTOMY, NODE DISSECTION, TUMOR DEBULKING;  Exploratory Laparotomy, Total Abdominal Hysterectomy, Bilateral Salpingo-Oophorectomy, appendectomy,lysis of adhesions, ileal, ascending, transverse and splenic flexure resection, ileal descending bowel renanastomosis, incidental cystotomy repair, CUSA procedure and colonoscopy ; Curtis     INSERT PORT PERITONEAL ACCESS  4/3/2012    Procedure:INSERT PORT PERITONEAL ACCESS; Intraperitoneal Port Placement (c-arm); Surgeon:SAMUEL CARRASCO; [facility]     INSERT PORT PERITONEAL ACCESS  5/14/2014    Procedure: INSERT PORT PERITONEAL ACCESS;  Surgeon: Nga Yeung MD;  [facility]: [facility] OR     INSERT PORT VASCULAR ACCESS       LAPAROSCOPY DIAGNOSTIC (GYN)  5/14/2014    Procedure: LAPAROSCOPY DIAGNOSTIC (GYN);  Surgeon: Nga Yeung MD;  [facility]     LAPAROTOMY EXPLORATORY Right 11/25/2015    Procedure: LAPAROTOMY EXPLORATORY;  Surgeon: Nga Yeung MD;  [facility]     LAPAROTOMY, TUMOR DEBULKING, COMBINED  5/14/2014    Procedure: COMBINED LAPAROTOMY, TUMOR DEBULKING;  Surgeon: Nga Yeung MD;  [facility]     REMOVE CATHETER PERITONEAL N/A 8/20/2014    Procedure: REMOVE CATHETER PERITONEAL;  Surgeon: Nga Yeung MD;  [facility]     VASCULAR SURGERY      stent left iliac vein            Family History:     Family History   Problem Relation Age of Onset     Cancer Mother 69        lung, smoker     Cancer Maternal Uncle 65        brain     Colon Cancer Maternal Aunt 80        colon            Social History:     Social History     Tobacco Use     Smoking status: Former Smoker      Years: 8.00     Types: Cigarettes     Last attempt to quit: 1980     Years since quittin.2     Smokeless tobacco: Never Used     Tobacco comment: started at 11 yo and quit at 16 yo   Substance Use Topics     Alcohol use: Yes     Comment: 3x/day wine or jodi     History   Sexual Activity     Sexual activity: Not on file            Current Medications (antimicrobials listed in bold):       [START ON 2019] ascorbic acid  500 mg Oral Daily     calcium citrate-vitamin D  1 tablet Oral Daily     [START ON 2019] cefTRIAXone  2 g Intravenous Q24H     [START ON 2019] cyanocobalamin  1,000 mcg Intramuscular Q30 Days     dexamethasone  4 mg Oral Q8H CAMILLE    Followed by     [START ON 2019] dexamethasone  3 mg Oral Q8H [place]    Followed by     [START ON 2019] dexamethasone  2 mg Oral Q8H [place]    Followed by     [START ON 2019] dexamethasone  1 mg Oral Q8H CAM[place]LE     dronabinol  2.5 mg Oral BID AC     ferrous sulfate  324 mg Oral TID w/meals     levothyroxine  50 mcg Oral Daily     pantoprazole  40 mg Oral Daily     polyethylene glycol  17 g Oral TID     senna-docusate  2 tablet Oral BID     sodium chloride (PF)  3 mL Intracatheter Q8H            Allergies:   No Known Allergies         Physical Exam:   Vitals were reviewed  Ranges for his vital signs:  Temp:  [97.7  F (36.5  C)-98  F (36.7  C)] 98  F (36.7  C)  Pulse:  [81-91] 91  Heart Rate:  [80-89] 87  Resp:  [14-20] 20  BP: ()/(43-83) 87/83  MAP:  [66 mmHg-85 mmHg] 85 mmHg  Arterial Line BP: ()/(50-74) 108/55  SpO2:  [96 %-100 %] 98 %    Physical Examination:  GENERAL:  in bed in no acute distress.   HEENT:  Scalp surgical incision is c/d/i  EYES:  Eyes have anicteric sclerae without conjunctival injection or stigmata of endocarditis.   ENT:  Oropharynx is moist without exudates or ulcers. Tongue is midline  NECK:  Supple. No  Cervical lymphadenopathy  LUNGS:  Clear to auscultation bilateral.   CARDIOVASCULAR:  Regular rate and  rhythm with no murmurs, gallops or rubs.  ABDOMEN:  Normal bowel sounds, soft, nontender.   SKIN:  Port without any erythema, pain, or redness   NEUROLOGIC:  Grossly nonfocal. Active x4 extremities         Laboratory Data:     Inflammatory Markers    Recent Labs   Lab Test 09/17/19  1241 01/03/13  1049   SED 42* 11   CRP 53.9* <5.0       Hematology Studies    Recent Labs   Lab Test 09/18/19  1050 09/17/19  1241 09/13/19  0656 09/12/19  0653 09/11/19  1151 09/10/19  0632 09/09/19  2218 09/05/19  1230   WBC  --  8.9 5.0 5.1 5.6 4.1 5.9 7.2   ANEU  --  6.4  --  4.6 4.9 3.8 5.5 4.2   AEOS  --  0.0  --  0.0 0.0 0.0 0.0 0.1   HGB 9.9* 10.4* 10.6* 10.0* 10.3* 9.8* 9.7* 12.6   MCV  --  100 100 99 100 99 98 100   PLT  --  309 104* 80* 85* 86* 104* 316       Immune Globulin Studies    Recent Labs   Lab Test 01/03/13  1049   *       Metabolic Studies     Recent Labs   Lab Test 09/18/19  1050 09/17/19  1241 09/14/19  0715 09/13/19  1951 09/12/19  0653 09/11/19  1151 09/11/19  0558  09/10/19  0632   *  --   --   --  135 138 Canceled, Test credited  --  136   POTASSIUM 3.2*  --  4.5 3.3* 3.5 4.4 Canceled, Test credited  3.9   < > 3.0*   CHLORIDE 98  --   --   --  105 106 Canceled, Test credited  --  101   CO2 22  --   --   --  23 24 Canceled, Test credited  --  22   BUN 10 10  --   --  6* 7 Canceled, Test credited  --  7   CR 0.49* 0.57  --   --  0.51* 0.52 Canceled, Test credited  --  0.53   GFRESTIMATED >90 >90  --   --  >90 >90 Canceled, Test credited  --  >90    < > = values in this interval not displayed.       Hepatic Studies    Recent Labs   Lab Test 09/18/19  1050 09/17/19  1241 09/12/19  0653 09/11/19  1151 09/11/19  0558 09/10/19  0632 09/09/19  2218   BILITOTAL 0.6  --  0.8 1.2 Canceled, Test credited 2.0* 2.0*   ALKPHOS 722*  --  704* 742* Canceled, Test credited 676* 738*   ALBUMIN 2.3*  --  2.0* 2.0* Canceled, Test credited 2.0* 2.0*   AST 33 29 32 33 Canceled, Test credited 38 44   ALT 55*  --  58*  60* Canceled, Test credited 60* 67*       Thyroid Studies    Recent Labs   Lab Test 01/03/13  1049   TSH 5.83*   T4 0.84       Microbiology:  Culture Micro   Date Value Ref Range Status   09/16/2019 No growth after 2 days  Preliminary   09/16/2019 No growth after 2 days  Preliminary   09/15/2019 No growth after 2 days  Preliminary   09/15/2019 No growth after 2 days  Preliminary   09/14/2019 No growth after 4 days  Preliminary   09/14/2019 No growth after 4 days  Preliminary   09/13/2019 No growth after 5 days  Preliminary   09/13/2019 No growth after 5 days  Preliminary   09/12/2019 No growth  Final   09/12/2019 No growth  Final   09/11/2019 No growth  Final   09/10/2019 No growth  Final   09/10/2019 Canceled, Test credited  Specimen not received    Final   09/10/2019 (A)  Final    Cultured on the 1st day of incubation:  Klebsiella pneumoniae     09/10/2019   Final    Critical Value/Significant Value, preliminary result only, called to and read back by  Priyanka Parkinson RN at 1316 [id number]     09/10/2019 Susceptibility testing done on previous specimen  Final   09/09/2019 (A)  Final    Cultured on the 1st day of incubation:  Klebsiella pneumoniae     09/09/2019   Final    Critical Value/Significant Value, preliminary result only, called to and read back by  Sadie Parkinson RN [id number] [id number] 9/10/19 am/cn     09/09/2019   Final    (Note)  POSITIVE for KLEBSIELLA PNEUMONIAE by Verigene multiplex nucleic acid  test. Final identification and antimicrobial susceptibility testing  will be verified by standard methods.    Specimen tested with Verigene multiplex, gram-negative blood culture  nucleic acid test for the following targets: Acinetobacter sp.,  Citrobacter sp., Enterobacter sp., Proteus sp., E. coli, K.  pneumoniae/oxytoca, P. aeruginosa, and the following resistance  markers: CTXM, KPC, NDM, VIM, IMP and OXA.    Critical Value/Significant Value called to and read back by PRIYANKA PARKINSON RN @7762 9/10/19.  SCG       01/04/2013   Final    No Salmonella, Shigella, Campylobacter, E. coli O157, Aeromonas, or Plesiomonas   isolated.   01/18/2012   Final    No growth  The broth portion of this culture is being monitored for an additional 3 days.   If the broth becomes positive with growth, an amended report will be generated.       Urine Studies    Recent Labs   Lab Test 09/10/19  0830 08/12/19  0820 11/08/17  1440 08/10/17  0845 07/13/17  0900   LEUKEST Negative Negative Negative Negative Negative   WBCU <1 3 O - 2 1 <1

## 2019-09-18 NOTE — ANESTHESIA PROCEDURE NOTES
Arterial Line Procedure Note  Staff:     Anesthesiologist:  Eric Madrid MD  Location: In OR After Induction  Procedure Start/Stop Times:     patient identified, IV checked, site marked, risks and benefits discussed, informed consent, monitors and equipment checked, pre-op evaluation and at physician/surgeon's request      Correct Patient: Yes      Correct Position: Yes      Correct Site: Yes      Correct Procedure: Yes      Correct Laterality:  Yes    Site Marked:  Yes  Line Placement:     Procedure:  Arterial Line    Insertion Site:  Radial    Insertion laterality:  Left    Skin Prep: Chloraprep      Patient Prep: patient draped, mask, sterile gloves, hat and hand hygiene      Ultrasound Guided?: No      Catheter size:  20 gauge, 12 cm    Cath secured with: suture      Dressing:  Tegaderm and Biopatch    Complications:  None obvious    Arterial waveform: Yes      IBP within 10% of NIBP: Yes    Assessment/Narrative:      Two passes TWN, good blood flow, easy passage of wire and caheter

## 2019-09-18 NOTE — PROGRESS NOTES
"Op note [id number]    Cheng \"Brian\" MD Ileana   Neurosurgery, PGY-3    Please contact neurosurgery resident on call with questions.    Dial * * [phone], enter [phone] when prompted.     "

## 2019-09-18 NOTE — ANESTHESIA POSTPROCEDURE EVALUATION
Anesthesia POST Procedure Evaluation    Patient: Odalys Nathan   MRN:     [id number] Gender:   female   Age:    59 year old :      1960        Preoperative Diagnosis: Brain Tumor   Procedure(s):  Stealth Assisted Right Craniotomy And Tumor Resection   Postop Comments: No value filed.       Anesthesia Type:  Not documented  General    Reportable Event: NO     PAIN: Uncomplicated   Sign Out status: Comfortable, Well controlled pain     PONV: No PONV   Sign Out status:  No Nausea or Vomiting     Neuro/Psych: Uneventful perioperative course   Sign Out Status: Preoperative baseline; Age appropriate mentation     Airway/Resp.: Uneventful perioperative course   Sign Out Status: Non labored breathing, age appropriate RR; Resp. Status within EXPECTED Parameters     CV: Uneventful perioperative course   Sign Out status: Appropriate BP and perfusion indices; Appropriate HR/Rhythm     Disposition:   Sign Out in:  ICU  Disposition:  ICU  Recovery Course: Uneventful  Follow-Up: Not required           Last Anesthesia Record Vitals:  CRNA VITALS  2019 0957 - 2019 1057      2019             EKG:  Sinus rhythm          Last PACU Vitals:  Vitals Value Taken Time   BP 95/58 2019 11:10 AM   Temp     Pulse 92 2019 11:10 AM   Resp 16 2019 11:00 AM   SpO2 100 % 2019 11:12 AM   Temp src     NIBP     Pulse     SpO2     Resp     Temp     Ht Rate     Temp 2     Vitals shown include unvalidated device data.      Electronically Signed By: Eric Madrid MD, 2019, 11:13 AM

## 2019-09-18 NOTE — PROGRESS NOTES
This is a recent snapshot of the patient's [facility] medical record.  For current drug dose and complete information and questions, call [phone] or In [facility] pool, fv home infusion ([id number])  CSN Number:  [id number]

## 2019-09-18 NOTE — OR NURSING
Patients BP trending downward, labs resulted, EVELYN Madird called and notified. Verbal order for 250mL Bolus of LR started. MDA will come evaluate patient, otherwise patient has met transfer criteria for the Neuro SICU

## 2019-09-18 NOTE — PROGRESS NOTES
This is a recent snapshot of the patient's [facility] medical record.  For current drug dose and complete information and questions, call [phone] or In Basket pool, fv home infusion ([id number])  CSN Number:  [id number]

## 2019-09-18 NOTE — OR NURSING
My pre-op assessment was delayed due to interruptions by surgical resident  and anesthesiologist.Surgical discussion also involved pt's status at a [facility] and what interventions she could and would receive if needed.

## 2019-09-18 NOTE — BRIEF OP NOTE
"   [facility], [place]    Brief Operative Note    Pre-operative diagnosis: Brain Tumor  Post-operative diagnosis Brain Tumor  Procedure: Procedure(s):  Stealth Assisted Right Craniotomy And Tumor Resection  Surgeon: Surgeon(s) and Role:     * Bertin Dewey MD - Primary     * Bacilio Wise MD - Resident - Assisting     * Prosper Tejeda MD - Resident - Assisting  Anesthesia: General   Estimated blood loss: 25 mL  Drains: None  Specimens:   ID Type Source Tests Collected by Time Destination   A : right brain tumor Tissue Brain SURGICAL PATHOLOGY EXAM Bertin Dewey MD 9/18/2019  9:07 AM    B : right brain tumor Tissue Brain SURGICAL PATHOLOGY EXAM Bertin Dewey MD 9/18/2019  9:26 AM      Findings:   Frozen pathology: metastatic adenocarcinoma.   Complications: None.  Implants:    Implant Name Type Inv. Item Serial No.  Lot No. LRB No. Used   GRAFT DURAGEN 3X3&quot; ID-[id number] Bone/Tissue/Biologic GRAFT DURAGEN 3X3&quot; ID-[id number]  [employer] [id number] Right 1   IMP SCR SYN MATRIX LOW PRO 1.5X04MM SELF DRILL [id number] Metallic Hardware/Gould IMP SCR SYN MATRIX LOW PRO 1.5X04MM SELF DRILL [id number] NONE SYNTHES-STRATEC  Right 13   IMP PLATE SYN BOX LOW PROFILE 04H .511 Metallic Hardware/Gould IMP PLATE SYN BOX LOW PROFILE 04H .511  [employer]ES[employer]STRATEC NONE Right 2   IMP BUR HOLE COVER 17MM LOW PROFILE .527 Metallic Hardware/Gould IMP BUR HOLE COVER 17MM LOW PROFILE .527 NONE SYNTHES-STRATEC  Right 1      Prosper \"Brian\" MD Ileana   Neurosurgery, PGY-3    Please contact neurosurgery resident on call with questions.    Dial * * [phone], enter [phone] when prompted.       "

## 2019-09-18 NOTE — PLAN OF CARE
Neuro: A/Ox4. Call light appropriate. Neuros intact. Denies nausea. Has some minimal soreness from head incision. Denies any visual disturbance/deficits. Pupils equal and reactive. Pt ambulated to and from the bathroom with stand by assist this evening.   Pain: PRN oxycodone given once for headache and pain from L radial arterial line.   CV: Soft pressures 's with maps 65-80. Afebrile. Sinus rhythm. HR 70-90's.   Pulm: Room air at this time. Lung sounds equal and clear.   GI: Reg diet. Good appetite. Had dinner this evening. Pt feels constipated. Pt took scheduled senna and miralax this afternoon. No results at this moment. Bowel sounds active.   : Laws catheter out this afternoon per pt request. Pt voided post catheter removal this evening already.   Gtts: NS 75ml/hr.   Skin: intact. Preventive sacral mepilex. Incision with liquid bandage CDI and approximated.   IV& drains: Rport a cath accessed already.  L radial javed out this evening; was not drawing blood, poor wave line, NSG ok to remove a-line.   See flow sheets for further interventions and assessments.   A: Neuro checks q1hr until tomorrow 1pm then q2hr if stable. PRN oxycodone for pain. Pt to go to CT and MRI tonight. Currently waiting for transport at this time. Pt stable.   P: Cont to monitor, notify MD with any changes or concerns

## 2019-09-18 NOTE — TELEPHONE ENCOUNTER
2nd attempt to contact pt. M for pt to call to confirm appt with Gwen on 9/24 at 10am and to make an US appt prior

## 2019-09-18 NOTE — ANESTHESIA CARE TRANSFER NOTE
Patient: Odalys Nathan    Procedure(s):  Stealth Assisted Right Craniotomy And Tumor Resection    Diagnosis: Brain Tumor  Diagnosis Additional Information: No value filed.    Anesthesia Type:   General     Note:  Airway :Nasal Cannula  Patient transferred to:PACU  Comments: Awake with good patent airway.  FARRAHSHandoff Report: Identifed the Patient, Identified the Reponsible Provider, Reviewed the pertinent medical history, Discussed the surgical course, Reviewed Intra-OP anesthesia mangement and issues during anesthesia, Set expectations for post-procedure period and Allowed opportunity for questions and acknowledgement of understanding      Vitals: (Last set prior to Anesthesia Care Transfer)    CRNA VITALS  9/18/2019 0957 - 9/18/2019 1037      9/18/2019             EKG:  Sinus rhythm                Electronically Signed By: HAILE Thompson CRNA  September 18, 2019  10:37 AM

## 2019-09-19 NOTE — PROGRESS NOTES
"CLINICAL NUTRITION SERVICES - ASSESSMENT NOTE     Nutrition Prescription    RECOMMENDATIONS FOR MDs/PROVIDERS TO ORDER:  - Continue liberal/regular diet   - Consider more aggressive bowel regimen as/if appro     Malnutrition Status:    - Severe malnutrition in the context of acute on chronic illness     Recommendations already ordered by Registered Dietitian (RD):  - Continue Boost supplements as ordered.     Future/Additional Recommendations:  - PO/supp adequacy      REASON FOR ASSESSMENT  Odalys Nathan is a/an 59 year old female assessed by the dietitian for RN Consult - RD requested consult due to pt questions regarding supplements/snacks and prior history of nutrition concerns.     Medical History: [place] female, with pmhx of recurrent, metastatic ovarian cancer, colon resection in 2012, admitted recently for worsening fatigue, abdominal pain, SOB, weight loss of 25 lbs, and headaches, found to have Klebsiella pneumoniae bacteremia. Discharged on 9/14 on Ceftriaxone and returns for tumor resection of the mass lesion in the right anterior frontal lobe.     NUTRITION HISTORY  Pt reports appetite and PO have not been good since last hospitalization and at present (at least over the last month). Moments later, family reported pt was eating much better and pt agreed. Pt endorses weight loss, current early satiety, and decreased appetite overall. Pt can tolerate Boost. Pt has started on appetite stimulant but unable to confirm when.      CURRENT NUTRITION ORDERS  Diet: Regular  Intake/Tolerance: tolerating. Pt reports eating breakfast and requested supplements be ordered this am.   Per visit with pt 9/18: pt requesting supplement menu and \"special menu\" with beef nialloff. Family reported pt may be confusing hospitals. Explained menu and menu items available with regular diet. Encouraged pt to order snacks between meals if needed. Pt declined supplement or snack orders at this time. Per RN note " "9/18: good appetite, ate dinner.    LABS  Labs reviewed  K+ 3.2 (L)  Creatinine: 0.49 (L)  Magnesium: 1.1 (L)  Phosphorus: 1.9 (L)  Glucose trends: 164, 128 (H)    MEDICATIONS  Medications reviewed  Vitamin C  Citracal   Marinol   Ferrous sulfate  Miralax  Senokot   IVF @ 75 ml/hr     ANTHROPOMETRICS  Height: 165.1 cm (5' 5\") 65\"   Most Recent Weight: 52.6 kg (115 lb 15.4 oz)    IBW: 57 kg; pt is 91% IBW   BMI: Normal BMI; low-normal   Weight History:   Wt Readings from Last 8 Encounters:   09/19/19 52.6 kg (115 lb 15.4 oz)   09/11/19 60.9 kg (134 lb 3.2 oz)   09/05/19 54.6 kg (120 lb 6.4 oz)   08/29/19 57.3 kg (126 lb 4 oz)   08/23/19 58 kg (127 lb 13.9 oz)   08/15/19 60.6 kg (133 lb 8 oz)   08/08/19 59.9 kg (132 lb)   07/15/19 61.3 kg (135 lb 2 oz)   4% over ~ 2 weeks; 14% over ~ 1 month    Dosing Weight: 53 kg (current weight)    ASSESSED NUTRITION NEEDS  Estimated Energy Needs: 0163-7452+ kcals/day (25 - 30+ kcals/kg)  Justification: Maintenance  Estimated Protein Needs: 69-80+ grams protein/day (1.3 - 1.5+ grams of pro/kg)  Justification: Increased needs  Estimated Fluid Needs: 3905-0856 mL/day (1 mL/kcal)   Justification: Maintenance and Per provider pending fluid status    PHYSICAL FINDINGS  See malnutrition section below.  Deferred physical assessment--pt family arrived to room and wanting to visit with family.      MALNUTRITION  % Intake: </=75% for >/= 1 month (severe)  % Weight Loss: > 5% in 1 month (severe)  Subcutaneous Fat Loss: Unable to assess  Muscle Loss: Unable to assess  Fluid Accumulation/Edema: Unable to assess  Malnutrition Diagnosis: Severe malnutrition in the context of acute on chronic illness     NUTRITION DIAGNOSIS  Inadequate oral intake related to early satiety, decreased appetite as evidenced by decreased PO, weight loss, and malnutrition diagnosis.       INTERVENTIONS  Implementation  Nutrition Education: Provided education on supplements, menu, small/frequent meals (9/18 and 9/19) " Provided handout of available supplements  Medical food supplement therapy     Goals  Patient to consume % of nutritionally adequate meal trays TID, or the equivalent with supplements/snacks.     Monitoring/Evaluation  Progress toward goals will be monitored and evaluated per protocol.     Shilpi Tariq RD, LD, [facility]  Neuro ICU  Pager: [phone]

## 2019-09-19 NOTE — TELEPHONE ENCOUNTER
[facility] Call Center    Phone Message    May a detailed message be left on voicemail: yes    Reason for Call: Other: Pt  would like a call back to try to clear up all the information he is being buried with by hospital and clinic.  Please call him at [phone].  Pt  doesn't undertand what imaging is needed or who is putting in and removing which stents.     Action Taken: Message routed to:  [facility] ([facility] urology.

## 2019-09-19 NOTE — PLAN OF CARE
Problem: Adult Inpatient Plan of Care  Goal: Optimal Comfort and Wellbeing  Outcome: Improving     Problem: Adult Inpatient Plan of Care  Goal: Readiness for Transition of Care  Outcome: Improving     Problem: Post-Sedation Recovery  Goal: Recovery from Sedation Effects  Outcome: Improving   D/I: Patient on unit 4A Surgical/Neuro ICU   Neuro- Alert and oriented x 4. Follows commands, no deficits noted.  CV- Sinus rhythm, blood pressure WDL,   Pulm- Lung sounds clear and audible, pt. On room air.  GI- Good appetite, denies nausea, no BM in 2-3 days per pt. Report.  - Voiding spontaneously, good urine output.  Skin- Surgical incision on head is WDL, no redness, no edema.   Pain- Complains of an intermittent achy headache  See flow sheets for further interventions and assessments.   A: Stable   P: Continue to monitor pt closely. Notify MD of significant changes

## 2019-09-19 NOTE — OP NOTE
Name:  Odalys Nathan  MRN:  [id number]  YOB: 1960  Date of Surgery:  September 18, 2019      Pre-operative Diagnosis: Right frontal metastasis  Post-operative Diagnosis: Same  Procedure:   1) Right craniotomy for tumor resection  2) Neuro-navigation  3) Micro-dissection    Anesthesia: DELMER    Surgeon: Bertin Chinchilla MD, PhD  Assistant: Bacilio Wise MD    Indications: 59 F with Ovarian cancer with symptomatic 4.5 cm right frontal metastasis who presents for surgical resection.    Description of Procedure: After pre-operative laboratory value and informed consent were verified, the patient was brought into the operating room. The patient underwent general anesthesia and intubation. Antibiotic was administered.    The patient was placed in a supine position, with the head turned to the left, exposing the right frontal cranium.  The [employer] Stealth reference frame was placed. The patient's anatomy was registered relative to the pre-operative MRI using the [employer] system.    The region overlying the tumor was identified. An incision was planned based on the location of the tumor. The area encompassing the surgical incision was prepped and draped in a sterile manner.     Time out was performed. The incision was infiltrated with 0.5 % marcaine with dilute epinephrine. Hemostasis was achieved using a combination of cautery and Arash clips. The incision was retracted using skin hooks.  The region overlying the tumor was confirmed using the Stealth probe.    A Nilda hole was placed at the posterior-lateral edge of the bony exposure and extended into a craniotomy.  Dural bleeding was controlled using dural tenters. The epidural space was packed with Floseal.     The dura was opened in a c-shape manner, with the pedicle toward the midline. Corticectomy was performed in the region immediate superficial to the lesion. The tumor specimen was immediately apparent. A biopsy was taken and sent to  pathology.     Frozen pathology findings are consistent with metastatic tumor.    The plane between the tumor capsule and the normal cerebrum was identified. This plane was developed under microscopic magnification. Through a combination of tumor debulking and plane development, the dissection as carried out until the entirety of the lesion was removed. Meticulous hemostasis was achieved after the tumor resection. The resection cavity was overlaid with surgicel.    After hemostasis, I turned my attention to the closure.  The craniotomy flap was secured using titanium plate/screw construct. The wound was amply irrigated with bacitracin containing saline. The galea was closed with 2'0 vicryl. The skin was closed with 3'0 Monocryl. Dermabond was applied.    I was present and performed the key portions of the procedure.    EBL: < 10 cc's    Specimens: Resected tumor specimens    Disposition: ICU

## 2019-09-19 NOTE — PROGRESS NOTES
09/19/19 1300   Quick Adds   Type of Visit Initial Occupational Therapy Evaluation   Living Environment   Lives With spouse   Living Arrangements house   Home Accessibility stairs to enter home;stairs within home   Number of Stairs, Main Entrance 2   Number of Stairs, Within Home, Primary 8  (multi-level home - bathroom on lower level and upper level)   Transportation Anticipated car, drives self;family or friend will provide   Living Environment Comment Pt reports that she uses all levels of her home at baseline; drives; family able to assist.  Pt has adult children who live nearby,  works overnight and therefore is able to assist prn.  Pt also lives with her dog and cat   Self-Care   Usual Activity Tolerance good   Current Activity Tolerance moderate   Regular Exercise Yes   Activity/Exercise Type walking   Exercise Amount/Frequency daily   Equipment Currently Used at Home none   Activity/Exercise/Self-Care Comment Pt was previously independent with all basic mobility and self cares.  pt reports she walks her dog multiple times/day. intermittently feels SOB with distance ambulation 2/2 known PEs.  Pt reports increased fatigue/decreased motivation with activity x approx 1 month   Functional Level   Ambulation 0-->independent   Transferring 0-->independent   Toileting 0-->independent   Bathing 0-->independent   Dressing 0-->independent   Eating 0-->independent   Cognition 0 - no cognition issues reported   Fall history within last six months no   Which of the above functional risks had a recent onset or change? none       Present no   Language english   General Information   Onset of Illness/Injury or Date of Surgery - Date 09/18/19   Referring Physician Prosper Tejeda MD   Patient/Family Goals Statement Return to home   Additional Occupational Profile Info/Pertinent History of Current Problem Pt is a 58 yo female s/p right craniotomy 9/18 for resection of right frontal mass, likely  metastasis from ovarian adenocarcinoma.  Other ongoing medical co-morbidities include: severe malnutrition in context of acute on chronic illness, chronic anemia, biliary obstruction, bilateral pleural effusions, hydronephrosis with hydroureter, hypothyroidism, treated bacteremia (last + culture on 9/10).   Precautions/Limitations   (craniotomy precautions)   General Observations Pt is pleasant and agreeable; on RA   General Info Comments Activity: ambulate   Cognitive Status Examination   Orientation orientation to person, place and time   Level of Consciousness alert   Follows Commands (Cognition) WNL   Memory intact   Attention No deficits were identified   Cognitive Comment no acute cognitive deficits noted;  Pt and  report cognition is baseline - pt reports some increased difficulty with fatigue which impacts her sustained attention and higher level executive functioning at times   Visual Perception   Visual Perception Comments pt denies acute visual changes; wears glasses for reading at baseline   Sensory Examination   Sensory Comments diminished sensation in fingertips and toes per baseline   Pain Assessment   Patient Currently in Pain   (mild to moderate headache; chronic abdominal pain)   Range of Motion (ROM)   ROM Quick Adds No deficits were identified   Strength   Strength Comments strength grossly 5/5 in all extremities; deconditioned with chronic illness/underlying disease process and malnutrition   Mobility   Bed Mobility Bed mobility skill: Supine to sit   Bed Mobility Skill: Supine to Sit   Level of [place]: Supine/Sit stand-by assist   Transfer Skill: Bed to Chair/Chair to Bed   Level of [place]: Bed to Chair stand-by assist   Transfer Skill: Sit to Stand   Level of [place]: Sit/Stand stand-by assist   Transfer Skill: Toilet Transfer   Level of [place]: Toilet stand-by assist   Balance   Balance Comments no LOB with functional ambulation and transfers throughout session    Lower Body Dressing   Level of [place]: Dress Lower Body stand-by assist   Toileting   Level of [place]: Toilet stand-by assist   Grooming   Level of [place]: Grooming stand-by assist   Eating/Self Feeding   Level of [place]: Eating independent   Instrumental Activities of Daily Living (IADL)   Previous Responsibilities meal prep;shopping;medication management;driving   IADL Comments family able to assist with IADLs prn   Activities of Daily Living Analysis   Impairments Contributing to Impaired Activities of Daily Living cognition impaired;post surgical precautions   ADL Comments fatigue   General Therapy Interventions   Planned Therapy Interventions ADL retraining;IADL retraining;strengthening;transfer training   Clinical Impression   Criteria for Skilled Therapeutic Interventions Met yes, treatment indicated   OT Diagnosis decreased activity tolerance and independence with ADLs   Influenced by the following impairments post surgical precautions, fatigue, generalized deconditioning   Assessment of Occupational Performance 3-5 Performance Deficits   Identified Performance Deficits bathing, mobility, home management, stairs   Clinical Decision Making (Complexity) Low complexity   Therapy Frequency Daily   Predicted Duration of Therapy Intervention (days/wks) 9/21/19   Anticipated Discharge Disposition Home with Assist   Risks and Benefits of Treatment have been explained. Yes   Patient, Family & other staff in agreement with plan of care Yes   Total Evaluation Time   Total Evaluation Time (Minutes) 5

## 2019-09-19 NOTE — PROGRESS NOTES
This is a recent snapshot of the patient's [facility] medical record.  For current drug dose and complete information and questions, call [phone] or In Basket pool, fv home infusion ([id number])  CSN Number:  [id number]

## 2019-09-19 NOTE — PROGRESS NOTES
"S: doing well post-operatively     O:  Temp:  [96.2  F (35.7  C)-100.4  F (38  C)] 97.4  F (36.3  C)  Pulse:  [71-91] 82  Heart Rate:  [] 86  Resp:  [14-20] 16  BP: ()/(43-83) 102/71  MAP:  [66 mmHg-85 mmHg] 68 mmHg  Arterial Line BP: ()/(50-74) 100/68  SpO2:  [95 %-100 %] 97 %    Exam:  General: Awake;  Alert, In No Acute Distress  Pulm: Breathing Comfortably on room air  Mental status: Oriented x 3  Cranial Nerves: Cranial Nerves II-XII Intact Bilaterally  Strength: 5/5 in bilateral upper and lower extremities   Pronator Drift: Absent  Sensory: Intact to Light Touch  INCISION: c/d/i    Assessment:   S/p right craniotomy 9/18 for resection of right frontal mass, 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 Doing well from neurosurgical perspective.     Other ongoing medical co-morbidities include: severe malnutrition in context of acute on chronic illness, chronic anemia, biliary obstruction, bilateral pleural effusions, hydronephrosis with hydroureter, hypothyroidism, treated bacteremia (last + culture on 9/10).    Plan:     Neuro:   Cerebral Edema: Decadron 12 day taper   Sutures absorbable   No additional imaging needed from neuro standpoint     Cardiovascular: blood pressure goals: SBP < 140     Pulmonary: Incentive spirometry     Gastrointestinal: advance diet     Renal: no issues     Heme:   No issues     Endocrine: No issues, sliding scale insulin prn while on decadron; home levothyroxine     Infectious: ceftriaxone per ID     PT/OT: pending    DVT prophylaxis: Sequential compression devices    Ulcer prophylaxis: protonix while on steroids    DISPO: transfer to medicine versus oncology primary     Barriers: evaluation by therapies    Prosper \"Brian\" MD [place]   Neurosurgery, PGY-3    Please contact neurosurgery resident on call with questions.    Dial * * [phone], enter [phone] when prompted.       "

## 2019-09-19 NOTE — PROGRESS NOTES
[facility] ID SERVICE CONSULTATION     Patient:  Odalys Nathan   Date of birth 1960, Medical record number [id number]  Date of Visit:  09/19/2019  Date of Admission: 9/18/2019  Consult Requester:Bertin Dewey, *            Assessment and Recommendations:   PROBLEM LIST:  1. Klebsiella pneumoniae (positive portacath 9/9 and 9/10. Negative on 9/11)  2. ovarian cancer (on Gemzar)         -s/p colon resection in 2012         -ERCP with stent placement on 8/23/2019 for worsening disease causing obstruction/chronic cholecystitis and biliary stricture        -mass lesion in the right anterior frontal lobe with surrounding vasogenic edema, most suggestive of intracranial metastatic disease, new since 8/3/2016. S/p tumor resection on 9/18        -New moderate right hydronephrosis and hydroureter. The right ureter appears potentially tethered to new enhancing tissue associated with the right ovarian vein concerning for focal worsening metastatic disease.  3.Religious    RECOMMENDATION:  1. Continue Ceftriaxone 2 grams IV Daily (Start date 9/11 - End Date 9/25)  2. On 9/26 - start Augmentin 875/125 mg po bid - please give a 2 month supply to cover her until the ERCP  3. Please check a CBC with diff and BMP 7 days from discharge   4. Recommend a TTE - prior to discharge  5. Recommend port-a-cath removal (would like to see a negative TTE prior to insertion of a PICC line)  6. She will need ID follow-up after the ERCP (I will have them call her to schedule an appointment)    ASSESSMENT:  Mrs. Odalys Nathan is a 59 year old Religious female, with pmhx of recurrent ovarian cancer (on Gemzar), originally diagnosed in 2012, s/p colon resection in 2012, presented to the ED 9/9 for worsening fatigue, abdominal pain, SOB, weight loss of 25 lbs, and headaches for the past 3-4 weeks, found to have Klebsiella pneumoniae bacteremia - grew from her port-a-cath on 9/9 and 9/10. No peripheral cultures were  "drawn at that time. Blood cultures turned negative on 9/11. Source of the bacteremia was thought to be from the biliary tract. She had undergone an ERCP on 8/23 for RUQ pain thought to be related to chronic cholecystitis - and had a transpapillary gallbladder stent place. It was noted during the ERCP per GI that there was \"significant intrahepatic biliary stenosis from her metastatic disease, despite normal bilirubin, but proceeded with biliary stenting due to contaminating these systems and impending obstruction. Only the left system was able to be stented but we could not get a stent into the right system.\" See ERCP report below.     When she was admitted on 9/9 - it was noted that her bilirubin had rinsed to 2.1. She had initially been on the schedule for a repeat ERCP as her bilirubin tanvir to 2 and it wsad thought that she may now have complete obstruction of the right intrahepatic ducts. Unfortunately, she was found to have a metastatic brain lesion and ERCP had to be deferred. She was discharged home on Ceftriaxone on 9/12 and readmitted on 9/17 for tumor resection of the mass lesion in the right anterior frontal lobe.     Spoke with GI during this admission - given that her LFTs have returned back to baseline and that her abdominal pain is stable there is no indication to repeat an ERCP at this time. Will need a repeat ERCP with stent exchange in the near future (tentatively one month from now).    Given that both blood cultures on 9/9 and 9/10 grew Klebsiella from the port, I am inclined to remove it. My suspicion of endocarditis in this case is low, given that the blood cultures cleared rapidly. That said, would recommend a TTE prior to insertion of a PICC line.     I am favoring treating the bacteremia for 14 days with IV Ceftriaxone. I am worried that because there is likely obstruction of the intrahepatic ducts, that this bacteremia may recur. Therefore, I favor continuing on oral Augmentin while we wait " for the ERCP. I would also like her to have ID Follow up after the ERCP to determine if stenting was possible. If not, she may need to be on suppressive therapy, given the high risk of recurrent bacteremia given the extensive metastatic disease.     In this case, a flouroquinolone would be a superior agent, however, given her concurrent steroids for the intracranial edema and the risk of CNS toxicity - I would favor Augmentin.     [facility]   Infections Diseases   630         Interval History:     Temp 100.4 overnight. White count up to 13.3. Slight headache today. No new abdominal pain.   All other ROS negative.          History of Present Illness:     Mrs. Odalys Nathan is a 59 year old [employer] female, with pmhx of recurrent ovarian cancer (on Gemzar, 2nd/recent last dose in 8/29/2019, originally diagnosed in 2012) s/p colon resection in 2012, who had a recent admission from 9/9-9/18 with worsening fatigue, abdominal pain, SOB, weight loss of 25 lbs, and headaches for the past 3-4 weeks. During that admission she was found to have a new metastatic lesion in the right anterior frontal lobe and Klebsiella bacteremia. She was started on Ceftriaxone and discharged home. She presented on 9/17 to undergo surgical resection of the brain mass.     She is now post op from a stealth assisted right craniotomy and tumor resection. She is awake, alert. She denies any fevers, chills. No complications from the Ceftriaxone infusion. No significant abdominal pain.             Physical Exam:   Vitals were reviewed  Ranges for his vital signs:  Temp:  [96.2  F (35.7  C)-100.4  F (38  C)] 97.4  F (36.3  C)  Pulse:  [71-91] 80  Heart Rate:  [] 78  Resp:  [14-20] 16  BP: ()/(43-83) 106/61  MAP:  [66 mmHg-85 mmHg] 68 mmHg  Arterial Line BP: ()/(50-74) 100/68  SpO2:  [95 %-100 %] 98 %    Physical Examination:  GENERAL:  in bed in no acute distress.   HEENT:  Scalp surgical incision is c/d/i  EYES:  Eyes  have anicteric sclerae without conjunctival injection or stigmata of endocarditis.   ENT:  Oropharynx is moist without exudates or ulcers. Tongue is midline  NECK:  Supple. No  Cervical lymphadenopathy  LUNGS:  Clear to auscultation bilateral.   CARDIOVASCULAR:  Regular rate and rhythm with no murmurs, gallops or rubs.  ABDOMEN:  Normal bowel sounds, soft, nontender.   SKIN:  Port without any erythema, pain, or redness   NEUROLOGIC:  Grossly nonfocal. Active x4 extremities         Laboratory Data:     Inflammatory Markers    Recent Labs   Lab Test 09/17/19  1241 01/03/13  1049   SED 42* 11   CRP 53.9* <5.0       Hematology Studies    Recent Labs   Lab Test 09/19/19  0311 09/18/19  1050 09/17/19  1241 09/13/19  0656 09/12/19  0653 09/11/19  1151 09/10/19  0632 09/09/19  2218 09/05/19  1230   WBC 13.3*  --  8.9 5.0 5.1 5.6 4.1 5.9 7.2   ANEU  --   --  6.4  --  4.6 4.9 3.8 5.5 4.2   AEOS  --   --  0.0  --  0.0 0.0 0.0 0.0 0.1   HGB 9.3* 9.9* 10.4* 10.6* 10.0* 10.3* 9.8* 9.7* 12.6   MCV 99  --  100 100 99 100 99 98 100     --  309 104* 80* 85* 86* 104* 316       Immune Globulin Studies    Recent Labs   Lab Test 01/03/13  1049   *       Metabolic Studies     Recent Labs   Lab Test 09/19/19  0311 09/18/19  1839 09/18/19  1050 09/17/19  1241 09/14/19  0715 09/13/19  1951 09/12/19  0653 09/11/19  1151    136 130*  --   --   --  135 138   POTASSIUM 3.2* 3.4 3.2*  --  4.5 3.3* 3.5 4.4   CHLORIDE 105 102 98  --   --   --  105 106   CO2 25 25 22  --   --   --  23 24   BUN 9 8 10 10  --   --  6* 7   CR 0.49* 0.52 0.49* 0.57  --   --  0.51* 0.52   GFRESTIMATED >90 >90 >90 >90  --   --  >90 >90       Hepatic Studies    Recent Labs   Lab Test 09/18/19  1050 09/17/19  1241 09/12/19  0653 09/11/19  1151 09/11/19  0558 09/10/19  0632 09/09/19  2218   BILITOTAL 0.6  --  0.8 1.2 Canceled, Test credited 2.0* 2.0*   ALKPHOS 722*  --  704* 742* Canceled, Test credited 676* 738*   ALBUMIN 2.3*  --  2.0* 2.0* Canceled,  Test credited 2.0* 2.0*   AST 33 29 32 33 Canceled, Test credited 38 44   ALT 55*  --  58* 60* Canceled, Test credited 60* 67*       Thyroid Studies    Recent Labs   Lab Test 01/03/13  1049   TSH 5.83*   T4 0.84       Microbiology:  Culture Micro   Date Value Ref Range Status   09/16/2019 No growth after 3 days  Preliminary   09/16/2019 No growth after 3 days  Preliminary   09/15/2019 No growth after 3 days  Preliminary   09/15/2019 No growth after 3 days  Preliminary   09/14/2019 No growth after 5 days  Preliminary   09/14/2019 No growth after 5 days  Preliminary   09/13/2019 No growth  Final   09/13/2019 No growth  Final   09/12/2019 No growth  Final   09/12/2019 No growth  Final   09/11/2019 No growth  Final   09/10/2019 No growth  Final   09/10/2019 Canceled, Test credited  Specimen not received    Final   09/10/2019 (A)  Final    Cultured on the 1st day of incubation:  Klebsiella pneumoniae     09/10/2019   Final    Critical Value/Significant Value, preliminary result only, called to and read back by  Priyanka Parkinson RN at 1316 9.10.19.DK     09/10/2019 Susceptibility testing done on previous specimen  Final   09/09/2019 (A)  Final    Cultured on the 1st day of incubation:  Klebsiella pneumoniae     09/09/2019   Final    Critical Value/Significant Value, preliminary result only, called to and read back by  Sadie Parkinson RN 7D 1108 9/10/19 am/cn     09/09/2019   Final    (Note)  POSITIVE for KLEBSIELLA PNEUMONIAE by Verigene multiplex nucleic acid  test. Final identification and antimicrobial susceptibility testing  will be verified by standard methods.    Specimen tested with Verigene multiplex, gram-negative blood culture  nucleic acid test for the following targets: Acinetobacter sp.,  Citrobacter sp., Enterobacter sp., Proteus sp., E. coli, K.  pneumoniae/oxytoca, P. aeruginosa, and the following resistance  markers: CTXM, KPC, NDM, VIM, IMP and OXA.    Critical Value/Significant Value called to  and read back by ZULEMA MURRAY RN @1324 9/10/19. SCG       01/04/2013   Final    No Salmonella, Shigella, Campylobacter, E. coli O157, Aeromonas, or Plesiomonas   isolated.   01/18/2012   Final    No growth  The broth portion of this culture is being monitored for an additional 3 days.   If the broth becomes positive with growth, an amended report will be generated.       Urine Studies    Recent Labs   Lab Test 09/10/19  0830 08/12/19  0820 11/08/17  1440 08/10/17  0845 07/13/17  0900   LEUKEST Negative Negative Negative Negative Negative   WBCU <1 3 O - 2 1 <1     ERCP:     Impression:          - Selective biliary cannulation.                        - Difficult gallbladder cannulation, but ultimately                        successful.                        - Biliary sphincterotomy performed                        - Cystic duct dilated to 4 mm and a 7 Fr x 15 cm DPT                        Compass stent placed into the gallbladder.                        - Balloon occlusion cholangiogram also demonstrated high                        grade stenosis of the left main with upstream dilation                        and stenosis of the right main and right posterior                        likely due to tumor infiltration (Bismuth IVb).                        - While the patient is not currently jaundice, clearly                        there has been a slowly rising alk phos most indicative                        of liver infiltration from her ovarian cancer and there                        is also a clear stenosis of the left main and the right                        posterior ducts. At this point, the intrahepatic biliary                        system is now contaminated and without adequate drainage                        she likely will develop cholangitis therefore the                        decision was made to commit to biliary stenting to avoid                        this complication and because I suspect  this patient                        will become jaundice very soon without intervention.                        - The left main hepatic duct was successfully dilated to                        4 mm.                        - 7 Fr x 15 cm SPT Zimmon stent placed into the left                        intrahepatic duct                        - Unable to pass a stent into the right posterior duct                        (7Fr or 5 Fr) after extened efforts. Since there was                        adequate drainage of the right lobe and no upstream                        dilation the importance of draining this sector is not                        as clear. She also has clear tumor burden on imaging in                        segement VI so may not benefit from stenting this                        segement anyway.

## 2019-09-19 NOTE — PLAN OF CARE
OT 4A: Evaluation complete and treatment initiated.  Discharge Planner OT   Patient plan for discharge: Home  Current status: Pt is alert, appropriately conversational, no apparent cognitive deficits.  Receptive to education on post surgical precautions. Primarily limited by fatigue. SBA-mod I with bed mobility, toileting, g/h in standing and hallway ambulation (approx 400ft) with VSS.    Barriers to return to prior living situation: acute medical needs  Recommendations for discharge: Home with family assist if needed  Rationale for recommendations: Pt is mobilizing well, independent with basic self cares, has good family support and is appropriate for discharge to home once medically cleared.         Entered by: Latisha Dyer 09/19/2019 1:52 PM

## 2019-09-19 NOTE — PLAN OF CARE
Neuro: Neuros WNL. Ambulated in [address] with SBA.   Pain: prn oxycodone given q4hr for mild soreness on incision site and slight headache.   CV: Afebrile. Soft pressures sbp 's with maps >65. Sinus rhythm 70-80's.   Pulm: Room air, lung sounds clear.   GI: Reg diet; good appetite. Pt did try to have a BM a couple times today; no BM at this time. Pt is passing gas. Bowel sounds active. NPO at midnight, pt will be going to IR to remove R port a cath.   : Voiding w/o difficulty. Lytes replaced and rechecked, no further replacement at this time.   Gtts: NSG ok to stop IV maint. Pt has adequate PO intake.   Skin: Incision CDI.       See flow sheets for further interventions and assessments.   A: Pt transferred to  this evening. Family present at bedside and helped carry pt belonging. All medications and chart sent with pt. ID wants to remove port a cath tomorrow. Pt will be going home on IV abx and will still need some access. Possibly PICC.   P: Cont to monitor, notify MD with any changes or concerns

## 2019-09-19 NOTE — CONSULTS
Patient is on IR schedule 9/20/19 for a central venous chest port removal due to multiple positive blood culture from the patient chest port.  Labs WNL for procedure.   Orders for NPO, scrubs and antibiotics have been entered.   Medications to be held include: none  Consent will be done prior to procedure.      Please contact the IR charge RN at [phone] for estimated time of procedure.     Case discussed with Dr. Hitchcock and Dr. Nunn.    David Bill PA-C  Interventional Radiology  Phone: [phone]  Pager: [phone]

## 2019-09-19 NOTE — OP NOTE
Procedure Date: 09/18/2019      NEUROSURGERY OPERATIVE REPORT      PREOPERATIVE DIAGNOSIS:    1. Metastatic adenocarcinoma of the ovaries.   2. Right frontal brain lesion     POSTOPERATIVE DIAGNOSIS:    1. Metastatic adenocarcinoma of the ovaries.   2. Right frontal brain lesion     PROCEDURAL NAME:   1. Right frontal craniotomy for resection of brain tumor.   2. Intraoperative use of neuronavigation.      SURGEON:  Bertin Dewey MD, PhD.      RESIDENT SURGEONS:   1. Bacilio Wise MD, MPH.   2. Prosper Tejeda MD.      ESTIMATED BLOOD LOSS:  25 mL.      DRAINS:  None.      COMPLICATIONS:  None.     IMPLANTS:  Synthes cranial plating system      ANESTHESIA:  General endotracheal anesthesia.      INDICATIONS:  Odalys Nathan s a 59-year-old female with a history of ovarian adenocarcinoma.  She was found to have mild headaches and underwent an MRI brain that revealed the presence of a large right frontal mass concerning for metastasis.  We therefore consented this patient for the above-stated procedure after a discussion of the risks, benefits and alternatives.  Of note, the patient is a [employer] and denied allowing blood transfusions and associated blood products before, after or during the procedure.  The patient, of note, was thrombocytopenic the previous week, and bacteremic.  She had spontaneous resolution of her thrombocytopenia on 9/17/19.  Her last positive blood culture was on 9/10/19 and subsequent cultures were negative.  The patient has been on appropriate ceftriaxone therapy for her klebsiella pneumonia per ID consultation.      PROCEDURAL DETAILS:   Odalys Nathan was brought back to the operating room.  General endotracheal anesthesia was obtained.  The patient was turned 180 degrees.  Her head was placed into a Parkman headholder.  The patient was then registered to the Stealth machine with a previously obtained MRI brain of the head.  The patient was then cleaned, prepped and draped in sterile  fashion.      We had planned our incision previously using Stealth to identify the borders of the tumor.  A total of 20 mL had been used for a local anesthetic.  A #10 blade was used to make a right frontal curvilinear scalp incision.  The incision was carried down to the pericranium.  We then used a periosteal elevator to mobilize the skin flap anteriorly.  The temporalis was left intact.  A Kerlix roll and fishhooks were used to retract the skin flap.  Thereafter, the Stealth probe was used to jessica the patient's skull for planning the craniotomy.  One kalyn hole was made using the high speed  at the postero-lateral aspect of the tumor.  A B1 with footplate was then used to create a craniotomy.  The bone flap was removed and placed on the back table.  Thereafter, a C1 drill was used to drill holes around the periphery of the craniotomy.  The patient's dura was then tacked up to control epidural bleeding using 4-0 Nurolon sutures.  Thereafter, we placed 4-0 Nurolon stitch at the lateral border of the dura and used countertraction to then incise the dura with a #15 blade.  The dural opening was opened in a C-shaped fashion with a pedicle at the medial edge of the craniotomy.  We next made a corticectomy at the center.  The tumor plane was identified, and we  it from the native brain parenchyma.  The tumor was then removed en bloc after mobilizing the tumor edges throughout.  Hemostasis was obtained with bipolar cautery.  Surgicel was used to line the surgical cavity.  Thrombin was used to wet the Surgicel.  We then attempted to reapproximate the dura using 4-0 Nurolon sutures.  It was unable to be primarily closed due to shrinkage of the native dura.  The cavity was filled with irrigant.  Hereafter, a 3 x 3-piece of Duragen was laid on top of the native dura.  We then replaced the bone flap.  The bone flap was secured using titanium screws and plates from the exsulin plating system.      Skin was  closed in layers with 3-0 Vicryl sutures in inverted interrupted fashion to close the galea.  Hereafter, we closed the skin with 4-0 Monocryl suture in a running fashion.  This incision was dressed with Exofin.      Her head was unpinned.  The patient was turned over to Anesthesia for extubation.  She was extubated in the operating room uneventfully.  The patient returned to the Postoperative Anesthesia Care Unit without difficulty.      Dr. Kumar was present throughout the entirety of the case.  All counts at the end of the procedure were correct x 2.       AGATHA KUMAR MD, PhD     As dictated by ERIC BANKS MD            D: 2019   T: 2019   MT: AZIZA      Name:     MAURY MERA   MRN:      [id number]        Account:        [id number]   :      1960           Procedure Date: 2019      Document: [id number]

## 2019-09-19 NOTE — PLAN OF CARE
PT 4A: Defer - PT orders acknowledged and appreciated. Visualized pt ambulating IND during OT session over household and community distances without LOB. Pt with generalized deconditioning which can be expected with medical history and procedure. Pt does not require IP PT to facilitate a discharge to home when medically appropriate. OT to follow for appropriate teaching. PT to complete orders. Please reorder with change in status. Thank you for your referral.

## 2019-09-20 NOTE — PLAN OF CARE
"/43 (BP Location: Right arm)   Pulse 82   Temp 98.2  F (36.8  C) (Oral)   Resp 18   Ht 1.651 m (5' 5\")   Wt 52.6 kg (115 lb 15.4 oz)   SpO2 98%   BMI 19.30 kg/m    Status: POD#2 following Right frontal craniotomy for resection of brain tumor  VS: VSS on RA ex soft BPs within parameters for this shift. Bolus given last evening  Neuro:  Intact ex slowed cog processing reported by pt. Slow to respond  Respiratory: Lung Sounds CTA, denies SOB  GI: NPO since midnight, BM x1 this shift  : voids spontaneously without difficulty  IV: PIV running NS @ 75ml/hr  Skin: Head incision liquid bandaged, intact  Pain: managed effectively with PO oxycodone q3-4h  Activity: up with independence during day, bed alarm on at night - patient aware.  See flow sheets for further details and assessments.  Plan of Care: discharge to home when medically ready. Pt to have possible PICC placed today as well as chest PAC removed in IR. Per, ID, long-term IV antibiotics still recommended. Pt and  wish to speak to care coordinator before discharge to help arrange further f/u for all services needed.  "

## 2019-09-20 NOTE — PROGRESS NOTES
Patient Name: Odalys Nathan  Medical Record Number: [id number]  Today's Date: 9/20/2019    Procedure: Port removal   Proceduralist: Dr. Mayberry and Dr. Zavala    Sedation start time: 0905  Sedation end time: 0925  Sedation medications administered: 50 mcg fentanyl and 1 mg versed   Total sedation time: 20 minutes     Procedure start time: 0905  Puncture time: 0910  Procedure end time: 0925    Report given to: Keisha WEBBER    Other Notes: Pt arrived to IR room 2 from . Consent reviewed. Pt denies any questions or concerns regarding procedure. Pt positioned supine and monitored per protocol. Pt tolerated procedure without any noted complications. Pt transferred back to .

## 2019-09-20 NOTE — PROGRESS NOTES
Gynecologic Oncology Brief Note    Odalys Nathan MRN# [id number]   Age: 59 year old YOB: 1960     Date of Admission:  9/18/2019    59 year old female with recurrent ovarian cancer currently admitted to neurosurgery following right frontal craniotomy for tumor resection.     She was recently admitted 9/9/19 - 9/14/19 for recurrent ovarian cancer with finding of new brain mass, severe malnutrition, biliary obstruction with stent in place and possible cholangitis, found to have Klebsiella pneumonia bacteremia. Per GI, no current interventions and patient to be scheduled for ERCP in the future to remove stents. Patient was discharged home with IV antibiotics with plan to return for neurosurgery 9/18/19.     9/18/19 Patient underwent right frontal craniotomy with resection of tumor. Patient has been doing well postoperatively. She continues to have severe malnutrition in context of acute on chronic illness, chronic anemia, biliary obstruction with stents in place, bilateral pleural effusions, hydronephrosis with hydroureter, hypothyroidism, and on treatment for bacteremia (last positive culture on 9/10). ID has been consulted and patient continues on course of ceftriaxone. Her port was removed 9/20/19 and a PICC line was placed. She had elevated lactic acid 2.2 today following port removal.     Patient reports she has been doing well postoperatively. Notes occasional mild frontal headache best controlled with tylenol and ibuprofen. Both she and her  report her memory/thinking and appetite have improved. Notes occasional SOB with exertion. Has had some hypotension off and on since admission but that has improved on the last check. Has some occasional abd pain that remains the same and has not worsened. No CP, N/V, fevers, difficulty voiding, constipation or diarrhea.        Active Issues and Planned Follow-up:     Ovarian cancer: Gemzar on hold. S/p right craniotomy for tumor resection. Patient  to follow up with neurosurgery in 2 weeks. Possible radiation in the future. Will follow up with Dr Yeung 10/3.    Klebsiella pneumoniae bacteremia: (positive blood cultures from portacath 9/9 and 9/10. Negative on 9/11). ID following. Port removed 9/20 and PICC line placed. Continue Ceftriaxone 2 grams IV Daily (Start date 9/11 - End Date 9/25). To start Augmentin 875/125 mg po bid on 9/26 - provide 2 month supply to cover until the ERCP. Follow up with Dr Santana 10/15.     Biliary obstruction due to worsening disease: s/p ERCP with stent placement on 8/23/2019 for disease causing obstruction/chronic cholecystitis and biliary stricture. Patient to follow up with GI in 2 months for repeat ERCP, to stay on Augmentin until repeat procedure    New moderate right hydronephrosis and hydroureter: The right ureter appears potentially tethered to new enhancing tissue associated with the right ovarian vein concerning for focal worsening metastatic disease. Patient to follow up with urology as an outpatient on 9/24 with scheduled repeat renal ultrasound.     Lactic acidosis/Hypotension: Agree with fluid resuscitation, blood cultures, and infectious workup currently ongoing per primary team.    HAILE Jose CNP      GYN ONC Fellow Addendum:  Appreciate neurosurgery care and assistance with palliation of symptoms from metastatic disease.  Pt discussed with me. Currently postop with mildly elevated lactate and intermittent hypotension responding to fluid resuscitation.  GYN ONC available for consult as needed, no plans for inpatient chemotherapy or treatment planning.  Will address at next outpt visit with Dr. Yeung on 10/3.    Pt has close outpatient followup scheduled as detailed above for her ongoing chronic medical issues 2/2 her metastatic disease.    Discussed with Dr. Chapito Sanchez MD

## 2019-09-20 NOTE — TELEPHONE ENCOUNTER
"Called patient and  about all the \"hospital stuff\" going on with her in the hospital  I believe she needs to see us because of hydronephrosis she is scheduled for renal ultrasound and see ovi to see where the kidney is at.   kept talking about surgery for stent placement  Patricia Gomes, AKILA Staff Nurse    "

## 2019-09-20 NOTE — PROCEDURES
[facility], [place]    Procedure: IR Procedure Note  Date/Time: 9/20/2019 9:41 AM  Performed by: Augustine Tatum MD  Authorized by: Augustine Tatum MD     UNIVERSAL PROTOCOL   Site Marked: Yes  Prior Images Obtained and Reviewed:  Yes  Required items: Required blood products, implants, devices and special equipment available    Patient identity confirmed:  Verbally with patient  Patient was reevaluated immediately before administering moderate or deep sedation or anesthesia  Confirmation Checklist:  Patient's identity using two indicators, relevant allergies, procedure was appropriate and matched the consent or emergent situation and correct equipment/implants were available  Time out: Immediately prior to the procedure a time out was called    Preparation: Patient was prepped and draped in usual sterile fashion       ANESTHESIA    Local Anesthetic: Lidocaine 1% without epinephrine      SEDATION    Patient Sedated: Yes    Sedation:  Diazepam and fentanyl  Vital signs: Vital signs monitored during sedation    See dictated procedure note for full details.  Findings: Port pocket does not appear infected  Closed in usual fashion    Specimens: none    Complications: None    Condition: Stable    Plan: Per orders    PROCEDURE   Patient Tolerance:  Patient tolerated the procedure well with no immediate complications    Time of Sedation in Minutes by Physician:  30

## 2019-09-20 NOTE — PLAN OF CARE
OT/6A: Cancel. Patient visiting with family upon late PM approach; requesting to reschedule for tomorrow.

## 2019-09-20 NOTE — PROGRESS NOTES
SPIRITUAL HEALTH SERVICES  SPIRITUAL ASSESSMENT Progress Note  [facility] ([place]) 6A     REFERRAL SOURCE: JW screen    I went to inform Odalys of her access to the [employer] elders as provided by [facility]. She declined, as she already had informed the elders (in fact one was in her room).     PLAN: No follow up at this time.     Na Cervantes  Chaplain Resident  Pager [phone]

## 2019-09-20 NOTE — PROVIDER NOTIFICATION
09/20/19 1145   PICC Double Lumen 09/20/19 Left Basilic   Placement Date/Time: 09/20/19 1145   Catheter Brand: payasUgym  Size (Fr): 5 Fr  Lot #: [id number]  Full barrier precautions done: Yes, hand hygiene, sterile gown, sterile gloves, mask, cap, full body drape, chlorhexidine scrub  Consent Signed: Yes  Time Ou...   Site Assessment WDL   External Cath Length (cm) 4 cm   Extremity Circumference (cm) 23 cm   Dressing Intervention Chlorhexidine patch;Transparent;Securing device;New dressing   Dressing Change Due 09/27/19   PICC Comment PICC insertion done   Lumen A - Color GRAY   Lumen A - Status blood return noted;saline locked   Lumen A - Cap Change Due 09/24/19   Lumen B - Color PURPLE   Lumen B - Status blood return noted;saline locked   Lumen B - Cap Change Due 09/24/19   Extravasation? No   Line Necessity Yes, meets criteria

## 2019-09-20 NOTE — PRE-PROCEDURE
GENERAL PRE-PROCEDURE:   Procedure:  Port removal    Written consent obtained?: Yes    Risks and benefits: Risks, benefits and alternatives were discussed    Consent given by:  Patient  Patient states understanding of procedure being performed: Yes    Patient's understanding of procedure matches consent: Yes    Procedure consent matches procedure scheduled: Yes    Expected level of sedation:  Moderate  Appropriately NPO:  Yes  ASA Class:  Class 3- Severe systemic disease, definite functional limitations  Mallampati  :  Grade 2- soft palate, base of uvula, tonsillar pillars, and portion of posterior pharyngeal wall visible  Lungs:  Lungs clear with good breath sounds bilaterally  Heart:  Normal heart sounds and rate  History & Physical reviewed:  History and physical reviewed and no updates needed  Statement of review:  I have reviewed the lab findings, diagnostic data, medications, and the plan for sedation

## 2019-09-20 NOTE — PROGRESS NOTES
[facility] ID SERVICE CONSULTATION     Patient:  Odalys Nathan   Date of birth 1960, Medical record number [id number]  Date of Visit:  09/20/2019  Date of Admission: 9/18/2019  Consult Requester:Bertin Dewey, *            Assessment and Recommendations:   PROBLEM LIST:  1. Klebsiella pneumoniae (positive blood cultures from portacath 9/9 and 9/10. Negative on 9/11). Note that no peripheral cultures were obtained.   2. ovarian cancer (on Gemzar)         -s/p colon resection in 2012         -ERCP with stent placement on 8/23/2019 for worsening disease causing obstruction/chronic cholecystitis and biliary stricture        -mass lesion in the right anterior frontal lobe with surrounding vasogenic edema, most suggestive of intracranial metastatic disease, new since 8/3/2016. S/p tumor resection on 9/18        -New moderate right hydronephrosis and hydroureter. The right ureter appears potentially tethered to new enhancing tissue associated with the right ovarian vein concerning for focal worsening metastatic disease.  3.Anabaptism  4. Port removed on 9/20, Picc Line inserted on 9/20    RECOMMENDATION:  1. Continue Ceftriaxone 2 grams IV Daily (Start date 9/11 - End Date 9/25)  2. On 9/26 - start Augmentin 875/125 mg po bid - please give a 2 month supply to cover her until the ERCP  3. Please check a CBC with diff and BMP 7 days from discharge   4. Please fax labs to Dr. Santana in the ID Clinic.   5. Has ID follow up scheduled for 10/15 with Dr. Santana     ASSESSMENT:  Mrs. Odalys Nathan is a 59 year old Anabaptism, with pmhx of recurrent ovarian cancer (on Gemzar), originally diagnosed in 2012, s/p colon resection in 2012, presented to the ED 9/9 for worsening fatigue, abdominal pain, SOB, weight loss of 25 lbs, and headaches for 3-4 weeks, found to have Klebsiella pneumoniae bacteremia - grew from her port-a-cath on 9/9 and 9/10. No peripheral cultures were drawn at that time. Blood  "cultures turned negative on 9/11. Source of the bacteremia was thought to be from the biliary tract. She had undergone an ERCP on 8/23 for RUQ pain thought to be related to chronic cholecystitis - and had a transpapillary gallbladder stent place. It was noted during the ERCP per GI that there was \"significant intrahepatic biliary stenosis from her metastatic disease, despite normal bilirubin, but proceeded with biliary stenting due to contaminating these systems and impending obstruction. Only the left system was able to be stented, they could not get a stent into the right system.\" See ERCP report below.     When she was admitted on 9/9 - it was noted that her bilirubin had rinsed to 2.1. She had initially been on the schedule for a repeat ERCP as her bilirubin tanvir to 2 and it wsa thought that she may now have complete obstruction of the right intrahepatic ducts. Unfortunately, she was found to have a metastatic brain lesion and ERCP had to be deferred. She was discharged home on Ceftriaxone on 9/12 and readmitted on 9/17 for tumor resection of the mass lesion in the right anterior frontal lobe.     Spoke with GI during this admission - given that her LFTs have returned back to baseline and that her abdominal pain is stable there is no indication to repeat an ERCP at this time. Will need a repeat ERCP with stent exchange in the near future (tentatively one month from now).    Given that both blood cultures on 9/9 and 9/10 grew Klebsiella from the port, I am inclined to remove it. My suspicion of endocarditis in this case is low, given that the blood cultures cleared rapidly. That said, would recommend a TTE prior to insertion of a PICC line.     I am favoring treating the bacteremia for 14 days with IV Ceftriaxone. I am worried that because there is likely obstruction of the intrahepatic ducts, that this bacteremia may recur. Therefore, I favor continuing on oral Augmentin while we wait for the ERCP. I would also " like her to have ID Follow up after the ERCP to determine if stenting was possible. If not, she may need to be on suppressive therapy, given the high risk of recurrent bacteremia given the extensive metastatic disease.     In this case, a flouroquinolone would be a superior agent, however, given her concurrent steroids for the intracranial edema and the risk of CNS toxicity - I would favor Augmentin.     [facility]   Infections Diseases   6304         Interval History:     Tmax 99.2 overnight. No complaints this morning. No headaches, vision changes. No nausea or vomiting. No abdominal pain. Eating well.     All other ROS negative.          History of Present Illness:     Mrs. Odalys Nathan is a 59 year old Buddhist female, with pmhx of recurrent ovarian cancer (on Gemzar, 2nd/recent last dose in 8/29/2019, originally diagnosed in 2012) s/p colon resection in 2012, who had a recent admission from 9/9-9/18 with worsening fatigue, abdominal pain, SOB, weight loss of 25 lbs, and headaches for the past 3-4 weeks. During that admission she was found to have a new metastatic lesion in the right anterior frontal lobe and Klebsiella bacteremia. She was started on Ceftriaxone and discharged home. She presented on 9/17 to undergo surgical resection of the brain mass.     She is now post op from a stealth assisted right craniotomy and tumor resection. She is awake, alert. She denies any fevers, chills. No complications from the Ceftriaxone infusion. No significant abdominal pain.             Physical Exam:   Vitals were reviewed  Ranges for his vital signs:  Temp:  [97.3  F (36.3  C)-99.2  F (37.3  C)] 98.1  F (36.7  C)  Pulse:  [70-82] 81  Heart Rate:  [73-80] 78  Resp:  [12-18] 16  BP: ()/(39-67) 89/41  SpO2:  [92 %-100 %] 99 %    Physical Examination:  GENERAL:  in bed in no acute distress.   HEENT:  Scalp surgical incision is c/d/i  EYES:  Eyes have anicteric sclerae without conjunctival injection or  stigmata of endocarditis.   ENT:  Oropharynx is moist without exudates or ulcers. Tongue is midline  NECK:  Supple. No  Cervical lymphadenopathy  LUNGS:  Clear to auscultation bilateral.   CARDIOVASCULAR:  Regular rate and rhythm with no murmurs, gallops or rubs.  ABDOMEN:  Normal bowel sounds, soft, nontender.   SKIN:  Port without any erythema, pain, or redness   NEUROLOGIC:  Grossly nonfocal. Active x4 extremities         Laboratory Data:     Inflammatory Markers    Recent Labs   Lab Test 09/17/19  1241 01/03/13  1049   SED 42* 11   CRP 53.9* <5.0       Hematology Studies    Recent Labs   Lab Test 09/20/19  0532 09/19/19  0311 09/18/19  1050 09/17/19  1241 09/13/19  0656 09/12/19  0653 09/11/19  1151 09/10/19  0632 09/09/19  2218 09/05/19  1230   WBC 13.1* 13.3*  --  8.9 5.0 5.1 5.6 4.1 5.9 7.2   ANEU  --   --   --  6.4  --  4.6 4.9 3.8 5.5 4.2   AEOS  --   --   --  0.0  --  0.0 0.0 0.0 0.0 0.1   HGB 9.3* 9.3* 9.9* 10.4* 10.6* 10.0* 10.3* 9.8* 9.7* 12.6   * 99  --  100 100 99 100 99 98 100    301  --  309 104* 80* 85* 86* 104* 316       Immune Globulin Studies    Recent Labs   Lab Test 01/03/13  1049   *       Metabolic Studies     Recent Labs   Lab Test 09/20/19  0532 09/19/19  1211 09/19/19  0311 09/18/19  1839 09/18/19  1050 09/17/19  1241  09/12/19  0653     --  136 136 130*  --   --  135   POTASSIUM 3.5 3.6 3.2* 3.4 3.2*  --    < > 3.5   CHLORIDE 104  --  105 102 98  --   --  105   CO2 23  --  25 25 22  --   --  23   BUN 8  --  9 8 10 10  --  6*   CR 0.53  --  0.49* 0.52 0.49* 0.57  --  0.51*   GFRESTIMATED >90  --  >90 >90 >90 >90  --  >90    < > = values in this interval not displayed.       Hepatic Studies    Recent Labs   Lab Test 09/18/19  1050 09/17/19  1241 09/12/19  0653 09/11/19  1151 09/11/19  0558 09/10/19  0632 09/09/19  2218   BILITOTAL 0.6  --  0.8 1.2 Canceled, Test credited 2.0* 2.0*   ALKPHOS 722*  --  704* 742* Canceled, Test credited 676* 738*   ALBUMIN 2.3*  --   2.0* 2.0* Canceled, Test credited 2.0* 2.0*   AST 33 29 32 33 Canceled, Test credited 38 44   ALT 55*  --  58* 60* Canceled, Test credited 60* 67*       Thyroid Studies    Recent Labs   Lab Test 01/03/13  1049   TSH 5.83*   T4 0.84       Microbiology:  Culture Micro   Date Value Ref Range Status   09/16/2019 No growth after 4 days  Preliminary   09/16/2019 No growth after 4 days  Preliminary   09/15/2019 No growth after 4 days  Preliminary   09/15/2019 No growth after 4 days  Preliminary   09/14/2019 No growth  Final   09/14/2019 No growth  Final   09/13/2019 No growth  Final   09/13/2019 No growth  Final   09/12/2019 No growth  Final   09/12/2019 No growth  Final   09/11/2019 No growth  Final   09/10/2019 No growth  Final   09/10/2019 Canceled, Test credited  Specimen not received    Final   09/10/2019 (A)  Final    Cultured on the 1st day of incubation:  Klebsiella pneumoniae     09/10/2019   Final    Critical Value/Significant Value, preliminary result only, called to and read back by  Priyanka Parkinson RN at 1316 9.10.19.DK     09/10/2019 Susceptibility testing done on previous specimen  Final   09/09/2019 (A)  Final    Cultured on the 1st day of incubation:  Klebsiella pneumoniae     09/09/2019   Final    Critical Value/Significant Value, preliminary result only, called to and read back by  Sadie Parkinson RN 7D 1108 9/10/19 am/cn     09/09/2019   Final    (Note)  POSITIVE for KLEBSIELLA PNEUMONIAE by [employer].Netigene multiplex nucleic acid  test. Final identification and antimicrobial susceptibility testing  will be verified by standard methods.    Specimen tested with Verigene multiplex, gram-negative blood culture  nucleic acid test for the following targets: Acinetobacter sp.,  Citrobacter sp., Enterobacter sp., Proteus sp., E. coli, K.  pneumoniae/oxytoca, P. aeruginosa, and the following resistance  markers: CTXM, KPC, NDM, VIM, IMP and OXA.    Critical Value/Significant Value called to and read back by  ZULEMA MURRAY RN @1324 9/10/19. SCG       01/04/2013   Final    No Salmonella, Shigella, Campylobacter, E. coli O157, Aeromonas, or Plesiomonas   isolated.   01/18/2012   Final    No growth  The broth portion of this culture is being monitored for an additional 3 days.   If the broth becomes positive with growth, an amended report will be generated.       Urine Studies    Recent Labs   Lab Test 09/10/19  0830 08/12/19  0820 11/08/17  1440 08/10/17  0845 07/13/17  0900   LEUKEST Negative Negative Negative Negative Negative   WBCU <1 3 O - 2 1 <1     ERCP:     Impression:          - Selective biliary cannulation.                        - Difficult gallbladder cannulation, but ultimately                        successful.                        - Biliary sphincterotomy performed                        - Cystic duct dilated to 4 mm and a 7 Fr x 15 cm DPT                        Compass stent placed into the gallbladder.                        - Balloon occlusion cholangiogram also demonstrated high                        grade stenosis of the left main with upstream dilation                        and stenosis of the right main and right posterior                        likely due to tumor infiltration (Bismuth IVb).                        - While the patient is not currently jaundice, clearly                        there has been a slowly rising alk phos most indicative                        of liver infiltration from her ovarian cancer and there                        is also a clear stenosis of the left main and the right                        posterior ducts. At this point, the intrahepatic biliary                        system is now contaminated and without adequate drainage                        she likely will develop cholangitis therefore the                        decision was made to commit to biliary stenting to avoid                        this complication and because I suspect this patient                         will become jaundice very soon without intervention.                        - The left main hepatic duct was successfully dilated to                        4 mm.                        - 7 Fr x 15 cm SPT Zimmon stent placed into the left                        intrahepatic duct                        - Unable to pass a stent into the right posterior duct                        (7Fr or 5 Fr) after extened efforts. Since there was                        adequate drainage of the right lobe and no upstream                        dilation the importance of draining this sector is not                        as clear. She also has clear tumor burden on imaging in                        segement VI so may not benefit from stenting this                        segement anyway.

## 2019-09-20 NOTE — PLAN OF CARE
Status: POD#2 following Right frontal craniotomy for resection of brain tumor  Vitals: Hypotensive this afternoon triggering sepsis, bolus given, BP improved to 106/51.   Neuros: All neuros intact. Per report patient reported slow cognitive processing.  IV: PICC placed today w/ NS at 75 ml/hr. PIV SL  Resp/trach: WNL  Diet: Regular diet tolerated  Bowel status: BM today  : Voiding without difficulty  Skin: Head inc w/ liquid bandage CDI  Pain: PRN Oxy + Tylenol given x1 for pain   Activity: Up independent during day, bed alarm at night  Social:  present and supportive  Plan: Continue to monitor and follow POC. Potentially will transfer to 7th floor.

## 2019-09-20 NOTE — PROGRESS NOTES
Care Coordinator Progress Note    Admission Date/Time:  9/18/2019  Attending MD:  Bertin Dewey, *    Data  Chart reviewed, discussed with interdisciplinary team.   Patient was admitted for: Brain tumor (H).    Concerns with insurance coverage for discharge needs: None identified  Current Living Situation: Patient lives with   Support System: Marcos is Primary Caregiver  Services Involved: [facility] #[phone]: IV Ceftriaxone   Transportation at Discharge: Marcos  Transportation to Medical Appointments: Marcos  Barriers to Discharge: Medical update    Coordination of Care and Referrals:  [facility] updated    Assessment  Pt with brain tumor, s/p craniotomy and tumor resection 9/18/19. Prior to admission Pt was receiving daily IV ceftriaxone provided by [facility]. Pt has had her port-a -cath removed and a PICC line was placed today.  Per ID Pt will require IV ceftiaxone through 9/25/19.  I have met with Pt and her , Marcos to introduce myself and care coordinator role. Pt is pleased with [facility] services and agreeable to resumption post discharge.     Plan  Anticipated Discharge Date:  TBD  Anticipated Discharge Plan:  Home with , [facility] thru 9/25/19    Bernadine Jacobs, RN   Care coordinator #[phone]

## 2019-09-21 NOTE — PLAN OF CARE
Status: POD#2 following Right frontal craniotomy for resection of brain tumor  Vitals: vss on room air. Soft bps in parameters.   Neuros: A&Ox4. No c/o N/T. 5/5 strengths throughout  IV: PIV saline locked. PICC infusing mag and phos replacement  Resp/trach: clear   Diet: regular diet  Bowel status: no bm this shift  : voiding spontaneously   Skin: head incision LARRY, intact. Old port site intact  Pain: c/o incisional pain, oxycodone given with relief   Activity: independent during evening, bed alarm on at nighttime.   Social:  staying overnight.   Plan: continue with POC.

## 2019-09-21 NOTE — PLAN OF CARE
"BP 90/48 (BP Location: Right arm)   Pulse 87   Temp 97.9  F (36.6  C) (Oral)   Resp 18   Ht 1.651 m (5' 5\")   Wt 52.6 kg (115 lb 15.4 oz)   SpO2 96%   BMI 19.30 kg/m    Status: POD#3 following Right frontal craniotomy for resection of brain tumor  VS: VSS on RA ex soft BPs within parameters  Neuro: A/Ox4, Intact  Respiratory: Lung Sounds CTA, denies SOB  GI: NPO since midnight, BM x1 this shift  : voids spontaneously without difficulty  IV: PIV running NS @ 75ml/hr  Skin: Head incision LARRY, intact  Pain: managed effectively with scheduled meds and occasional use of  PO oxycodone  Activity: up with independence during day, bed alarm on at night - patient aware.  Social:  staying overnight.  See flow sheets for further details and assessments.  Plan of Care: Continue to monitor BP and follow POC  "

## 2019-09-21 NOTE — PLAN OF CARE
Status: POD#3 following Right frontal craniotomy for resection of brain tumor  Vitals: VSS on RA  Neuros: All neuros intact.   IV: PICC NS at 75 ml/hr. PIV SL  Resp/trach: WNL  Diet: Regular diet tolerated  Bowel status: BM 9/20  : Voiding without difficulty  Skin: Head inc w/ liquid bandage CDI  Pain: PRN Oxy, PRN tylenol for pain   Activity: Up independent during day, bed alarm at night.   Social:  present and supportive  Plan:discharge order placed. Patient discharging home with , awaiting prescriptions to be signed.

## 2019-09-21 NOTE — PLAN OF CARE
Pt left 6a @ 1800 with daughter and . Pt  received medications from discharge pharmacy. Pt declined transport services, wheelchair provided for departure from 6a. Pt declined 1800 medication and stated that she would take at home. Pt received oxycodone and zofran before she left. Pt states she has motion sickness, wanted to take for car ride. AVS went over with pt and , questions answered. PIV removed. PICC intact for home infusions.

## 2019-09-22 NOTE — PLAN OF CARE
Occupational Therapy Discharge Summary    Reason for therapy discharge:    Discharged to home.    Progress towards therapy goal(s). See goals on Care Plan in [facility] electronic health record for goal details.  Goals partially met.  Barriers to achieving goals:   discharge from facility.    Therapy recommendation(s):    No further therapy is recommended.    Rec Home w A

## 2019-09-23 NOTE — PROGRESS NOTES
This is a recent snapshot of the patient's [facility] medical record.  For current drug dose and complete information and questions, call [phone] or In [facility] pool, fv home infusion ([id number])  CSN Number:  [id number]

## 2019-09-23 NOTE — PROGRESS NOTES
This is a recent snapshot of the patient's [facility] medical record.  For current drug dose and complete information and questions, call [phone] or In [facility] pool, fv home infusion ([id number])  CSN Number:  [id number]

## 2019-09-24 NOTE — PROGRESS NOTES
It was my pleasure to meet Ms. Odalys Nathan, a 59 year old year old female seen in consultation today for chief complaint: RECHECK (Hydronephrosis follow up)    HPI:   Odalys Nathan is a 59 year old female, Jehovah Witness, with progressive metastatic ovarian cancer on palliative chemotherapy who was admitted 9/9/19 -9/14 for nausea, anorexia, abdominal pain and found to have positive blood cultures (Klebsiella pneumonia), biliary obstruction (possible cholangitis) as well as CT evidence of new right hydronephrosis secondary to malignant obstruction.   Urology was consulted and offered renal drainage, but ultimately the patient elected for conservative mgmt given she was asymptomatic, her creatinine was at baseline and there was no evidence of UTI. Of note, she did undergo uneventful right frontal craniotomy for resection of metastatic brain lesion on 9/18 but was able to d/c 2 days later.     Today she returns in routine followup after undergoing renal ultrasound earlier today that shows mild to moderate right hydronephrosis.  Creatinine is 0.50mg/dL  Still on antibiotics. - ceftroaxpme x 1 more day.  Then will transition to orals  Trying to keep ahead of the nausea.  Does have some right flank pain.   Using oxycodone which causes constipation and is adding Miralax and stool softeners.   Appetite is returning.   GI still may potentially upsize a hepatic stent but she has no follow up coordinated and there are certainly no plans for OR with GI right now.    Will need brain radiation in followup after the resection.   Hoping to get more chemo and has an appt with Dr. Yeung next week to further coordinate.   Has an appt with Oncology next week with Dr. Yeung to further coordinate.        Past Medical History:   Diagnosis Date     Antiplatelet or antithrombotic long-term use      Ascites      Blood clot in the legs      Diabetes (H)      Ovarian cancer (H)     serous,stg IV     Pleural effusion       Pulmonary embolism (H) 2/2012     Refusal of blood transfusions as patient is Muslim      Short gut syndrome      Subclinical hypothyroidism 4/18/2013     Thrombosis of leg        Past Surgical History:   Procedure Laterality Date     COLECTOMY       COLONOSCOPY  2/1/2012    Procedure:COLONOSCOPY; With Biopsy; Surgeon:TONI SULLIVAN; Location:[facility] OR     ENDOSCOPIC RETROGRADE CHOLANGIOPANCREATOGRAM N/A 8/23/2019    Procedure: Endoscopic Retrograde Cholangiopancreatogram with 4 mm Hurricane Balloon Dilation, gallbladder stent and Bile Duct stent placements, Bilarry Sphincterotomy ;  Surgeon: Catrachito Lloyd MD;  Location: [facility] OR     HYSTERECTOMY TOTAL ABD, RHIANNA SALPINGO-OOPHORECTOMY, NODE DISSECTION, TUMOR DEBULKING, COMBINED  2/1/2012    Procedure:COMBINED HYSTERECTOMY TOTAL ABDOMINAL, BILATERAL SALPINGO-OOPHORECTOMY, NODE DISSECTION, TUMOR DEBULKING;  Exploratory Laparotomy, Total Abdominal Hysterectomy, Bilateral Salpingo-Oophorectomy, appendectomy,lysis of adhesions, ileal, ascending, transverse and splenic flexure resection, ileal descending bowel renanastomosis, incidental cystotomy repair, CUSA procedure and colonoscopy ; Curtis     INSERT PORT PERITONEAL ACCESS  4/3/2012    Procedure:INSERT PORT PERITONEAL ACCESS; Intraperitoneal Port Placement (c-arm); Surgeon:SAMUEL CARRASCO; Location:[facility] OR     INSERT PORT PERITONEAL ACCESS  5/14/2014    Procedure: INSERT PORT PERITONEAL ACCESS;  Surgeon: Nga Yeung MD;  Location: [facility] OR     INSERT PORT VASCULAR ACCESS       IR PORT REMOVAL RIGHT  9/20/2019     LAPAROSCOPY DIAGNOSTIC (GYN)  5/14/2014    Procedure: LAPAROSCOPY DIAGNOSTIC (GYN);  Surgeon: Nga Yeung MD;  Location: [facility] OR     LAPAROTOMY EXPLORATORY Right 11/25/2015    Procedure: LAPAROTOMY EXPLORATORY;  Surgeon: Nga Yeung MD;  Location: [facility] OR     LAPAROTOMY, TUMOR DEBULKING, COMBINED  5/14/2014    Procedure: COMBINED LAPAROTOMY, TUMOR DEBULKING;  Surgeon: Nga Yeung  MD Airam;  Location: [facility] OR     OPTICAL TRACKING SYSTEM CRANIOTOMY, EXCISE TUMOR, COMBINED Right 9/18/2019    Procedure: Stealth Assisted Right Craniotomy And Tumor Resection;  Surgeon: Bertin Dewey MD;  Location: [facility] OR     PICC INSERTION Left 09/20/2019    5Fr - 46cm (4cm external), basilic vein, low SVC     REMOVE CATHETER PERITONEAL N/A 8/20/2014    Procedure: REMOVE CATHETER PERITONEAL;  Surgeon: Nga Yeung MD;  Location: [facility] OR     VASCULAR SURGERY      stent left iliac vein       FAMILY HISTORY: Denies family history of urologic cancer.     SOCIAL HISTORY:    reports that she quit smoking about 39 years ago. Her smoking use included cigarettes. She quit after 8.00 years of use. She has never used smokeless tobacco.    Current Outpatient Medications   Medication Sig Dispense Refill     albuterol (PROAIR HFA/PROVENTIL HFA/VENTOLIN HFA) 108 (90 Base) MCG/ACT inhaler Inhale 2 puffs into the lungs every 6 hours as needed for shortness of breath / dyspnea or wheezing 1 Inhaler 3     Ascorbic Acid (VITAMIN C PO) Take 500 mg by mouth daily        Calcium Carbonate-Vitamin D (CALCIUM + D PO) Take 1 tablet by mouth daily.       cefTRIAXone (ROCEPHIN) 2 GM vial Inject 2 g into the vein every 24 hours for 11 days 220 mL 0     clotrimazole 10 MG shane Take 1 Shane (10 mg) by mouth 5 times daily 70 Shane 0     cyanocobalamin (VITAMIN B12) 1000 MCG/ML injection Inject 1 mL (1,000 mcg) into the muscle every 30 days 1 mL 11     [START ON 9/27/2019] dexamethasone (DECADRON) 1 MG tablet Take 1 tablet (1 mg) by mouth every 8 hours for 3 days 9 tablet 0     dexamethasone (DECADRON) 1.5 MG tablet Take 2 tablets (3 mg) by mouth every 8 hours 8 tablet 0     dexamethasone (DECADRON) 2 MG tablet Take 1 tablet (2 mg) by mouth every 8 hours for 3 days 9 tablet 0     diphenoxylate-atropine (LOMOTIL) 2.5-0.025 MG per tablet Take 2 tablets by mouth 4 times daily as needed for diarrhea 60 tablet 0     dronabinol  (MARINOL) 2.5 MG capsule Take 1 capsule (2.5 mg) by mouth 2 times daily (before meals) 60 capsule 3     Ferrous Sulfate 324 (65 Fe) MG TBEC TAKE ONE TABLET BY MOUTH THREE TIMES DAILY WITH MEALS. TAKE WITH A SMALL AMOUNT OF ORANGE JUICE. DO NOT TAKE WITH CALCIUM 90 tablet 11     fluconazole (DIFLUCAN) 150 MG tablet TAKE 1 TABLET (150 MG) BY MOUTH ONCE FOR 1 DOSE  0     HEMP OIL OR EXTRACT OR OTHER CBD CANNABINOID, NOT MEDICAL CANNABIS,        HERBALS daily        ipratropium - albuterol 0.5 mg/2.5 mg/3 mL (DUONEB) 0.5-2.5 (3) MG/3ML neb solution Take 1 vial (3 mLs) by nebulization every 6 hours as needed for wheezing or shortness of breath / dyspnea 3 Box 0     LEVOTHYROXINE SODIUM PO Take 50 mcg by mouth daily        loperamide (IMODIUM) 2 MG capsule Take 2 mg by mouth 4 times daily as needed for diarrhea       LORazepam (ATIVAN) 1 MG tablet Take 1 tablet (1 mg) by mouth every 6 hours as needed (Anxiety, Nausea/Vomiting or Sleep) 30 tablet 2     magnesium oxide (MAG-OX) 400 MG tablet Take 1 tablet (400 mg) by mouth 2 times daily 90 tablet 3     ondansetron (ZOFRAN-ODT) 4 MG ODT tab Take 1-2 tablets (4-8 mg) by mouth every 6 hours as needed for nausea or vomiting 20 tablet 0     order for DME Injection Supplies for Vitamin B12: 3cc syringes w/ 27 gauge needles, 1/2 inch length 3 each 0     oxyCODONE (ROXICODONE) 5 MG tablet Take 1-2 tablets (5-10 mg) by mouth every 6 hours as needed for moderate to severe pain 20 tablet 0     oxyCODONE (ROXICODONE) 5 MG tablet Take 1 tablet (5 mg) by mouth every 6 hours as needed for severe pain 10 tablet 0     pantoprazole (PROTONIX) 40 MG EC tablet Take 1 tablet (40 mg) by mouth daily 8 tablet 0     potassium chloride (KLOR-CON) 20 MEQ packet Take 20 mEq by mouth daily       prochlorperazine (COMPAZINE) 10 MG tablet Take 1 tablet (10 mg) by mouth every 6 hours as needed for nausea or vomiting 30 tablet 1     VITAMIN E NATURAL PO Take 100 Units by mouth daily         ALLERGIES:  "Patient has no known allergies.      REVIEW OF SYSTEMS:  As above in HPI    GENERAL PHYSICAL EXAM:   Vitals: BP 90/62   Pulse 91   Ht 1.651 m (5' 5\")   Wt 53.5 kg (118 lb)   BMI 19.64 kg/m    Body mass index is 19.64 kg/m .    GENERAL: Well groomed, well developed, well nourished female in NAD.  No CVAT bilaterally    NEURO: Alert and oriented x 3.  PSYCH: Normal mood and affect, pleasant and agreeable during interview and exam.    RADIOLOGY: The following tests were reviewed:   EXAMINATION: US RENAL COMPLETE, 9/24/2019 1:41 PM      COMPARISON: Abdominal ultrasound 8/14/2019      HISTORY: Hydronephrosis     FINDINGS:     Right kidney: Measures 10.9 cm in length. Parenchyma is of normal  thickness and echogenicity. No focal mass. Mild to moderate  right-sided hydronephrosis, slightly worse than 8/14/2019.     Left kidney: Measures 10.0 cm in length. Parenchyma is of normal  thickness and echogenicity. No focal mass. No hydronephrosis.      Bladder: Unremarkable.                                                                      IMPRESSION:  Mild-to-moderate hydronephrosis, slightly worse than comparison  ultrasound 8/14/2019.     I have personally reviewed the examination and initial interpretation  and I agree with the findings.     SHANNEN CANTOR MD    LABS: The last test results for Ms. Odalys Nathan were reviewed.   BMP -   Recent Labs   Lab Test 09/24/19  0835 09/21/19  0819 09/20/19  0532 09/19/19  1641 09/19/19  1211 09/19/19  0311   NA  --  134 136  --   --  136   POTASSIUM  --  5.6* 3.5  --  3.6 3.2*   CHLORIDE  --  104 104  --   --  105   CO2  --  23 23  --   --  25   BUN 7 6* 8  --   --  9   CR 0.50* 0.42* 0.53  --   --  0.49*   GLC  --  171* 112*  --   --  128*   JOSE ALBERTO  --  8.3* 8.6  --   --  7.7*   MAG  --  1.8 1.4*  --  2.2 1.1*   PHOS  --  2.4* 2.0* 3.3  --  1.9*       CBC -   Recent Labs   Lab Test 09/24/19  0835 09/21/19  0819 09/20/19  0532   WBC 9.6 11.9* 13.1*   HGB 9.9* 9.4* 9.3*   PLT " 425 370 315       ASSESSMENT:   1) Right hydronephrosis - d/t extrinsic compression/ malignancy     PLAN:   - On ceftriaxone currently.  No need for urine culture.   - Will arrange right ureteral stent placement with JESSICA ColeC  Department of Urologic Surgery

## 2019-09-24 NOTE — PATIENT INSTRUCTIONS
- I will talk with Dr. Gan to see if we can arrange a right ureteral stent placement procedure    [facility]

## 2019-09-24 NOTE — NURSING NOTE
"Chief Complaint   Patient presents with     RECHECK     Hydronephrosis follow up       Blood pressure 90/62, pulse 91, height 1.651 m (5' 5\"), weight 53.5 kg (118 lb), not currently breastfeeding. Body mass index is 19.64 kg/m .    Patient Active Problem List   Diagnosis     Partial small bowel obstruction (H)     Ascites     Metastatic cancer (H)     Acute blood loss anemia     Poor nutrition     Elevated liver enzymes     Pulmonary embolism (H)     Diarrhea     Subclinical hypothyroidism     S/P exploratory laparotomy     Drug-induced neutropenia (H)     Hypomagnesemia     Ovarian cancer, right (H)     Ovarian cancer, left (H)     Anemia, unspecified type     Patient in cancer related research study     Encounter for long-term (current) use of medications     Encounter for antineoplastic chemotherapy     Ovarian cancer (H)     Brain tumor (H)       No Known Allergies    Current Outpatient Medications   Medication Sig Dispense Refill     albuterol (PROAIR HFA/PROVENTIL HFA/VENTOLIN HFA) 108 (90 Base) MCG/ACT inhaler Inhale 2 puffs into the lungs every 6 hours as needed for shortness of breath / dyspnea or wheezing 1 Inhaler 3     Ascorbic Acid (VITAMIN C PO) Take 500 mg by mouth daily        Calcium Carbonate-Vitamin D (CALCIUM + D PO) Take 1 tablet by mouth daily.       cefTRIAXone (ROCEPHIN) 2 GM vial Inject 2 g into the vein every 24 hours for 11 days 220 mL 0     clotrimazole 10 MG rell Take 1 Rell (10 mg) by mouth 5 times daily 70 Rell 0     cyanocobalamin (VITAMIN B12) 1000 MCG/ML injection Inject 1 mL (1,000 mcg) into the muscle every 30 days 1 mL 11     [START ON 9/27/2019] dexamethasone (DECADRON) 1 MG tablet Take 1 tablet (1 mg) by mouth every 8 hours for 3 days 9 tablet 0     dexamethasone (DECADRON) 1.5 MG tablet Take 2 tablets (3 mg) by mouth every 8 hours 8 tablet 0     dexamethasone (DECADRON) 2 MG tablet Take 1 tablet (2 mg) by mouth every 8 hours for 3 days 9 tablet 0     " diphenoxylate-atropine (LOMOTIL) 2.5-0.025 MG per tablet Take 2 tablets by mouth 4 times daily as needed for diarrhea 60 tablet 0     dronabinol (MARINOL) 2.5 MG capsule Take 1 capsule (2.5 mg) by mouth 2 times daily (before meals) 60 capsule 3     Ferrous Sulfate 324 (65 Fe) MG TBEC TAKE ONE TABLET BY MOUTH THREE TIMES DAILY WITH MEALS. TAKE WITH A SMALL AMOUNT OF ORANGE JUICE. DO NOT TAKE WITH CALCIUM 90 tablet 11     fluconazole (DIFLUCAN) 150 MG tablet TAKE 1 TABLET (150 MG) BY MOUTH ONCE FOR 1 DOSE  0     HEMP OIL OR EXTRACT OR OTHER CBD CANNABINOID, NOT MEDICAL CANNABIS,        HERBALS daily        ipratropium - albuterol 0.5 mg/2.5 mg/3 mL (DUONEB) 0.5-2.5 (3) MG/3ML neb solution Take 1 vial (3 mLs) by nebulization every 6 hours as needed for wheezing or shortness of breath / dyspnea 3 Box 0     LEVOTHYROXINE SODIUM PO Take 50 mcg by mouth daily        loperamide (IMODIUM) 2 MG capsule Take 2 mg by mouth 4 times daily as needed for diarrhea       LORazepam (ATIVAN) 1 MG tablet Take 1 tablet (1 mg) by mouth every 6 hours as needed (Anxiety, Nausea/Vomiting or Sleep) 30 tablet 2     magnesium oxide (MAG-OX) 400 MG tablet Take 1 tablet (400 mg) by mouth 2 times daily 90 tablet 3     ondansetron (ZOFRAN-ODT) 4 MG ODT tab Take 1-2 tablets (4-8 mg) by mouth every 6 hours as needed for nausea or vomiting 20 tablet 0     order for DME Injection Supplies for Vitamin B12: 3cc syringes w/ 27 gauge needles, 1/2 inch length 3 each 0     oxyCODONE (ROXICODONE) 5 MG tablet Take 1-2 tablets (5-10 mg) by mouth every 6 hours as needed for moderate to severe pain 20 tablet 0     oxyCODONE (ROXICODONE) 5 MG tablet Take 1 tablet (5 mg) by mouth every 6 hours as needed for severe pain 10 tablet 0     pantoprazole (PROTONIX) 40 MG EC tablet Take 1 tablet (40 mg) by mouth daily 8 tablet 0     potassium chloride (KLOR-CON) 20 MEQ packet Take 20 mEq by mouth daily       prochlorperazine (COMPAZINE) 10 MG tablet Take 1 tablet (10 mg)  by mouth every 6 hours as needed for nausea or vomiting 30 tablet 1     VITAMIN E NATURAL PO Take 100 Units by mouth daily         Social History     Tobacco Use     Smoking status: Former Smoker     Years: 8.00     Types: Cigarettes     Last attempt to quit: 1980     Years since quittin.2     Smokeless tobacco: Never Used     Tobacco comment: started at 11 yo and quit at 16 yo   Substance Use Topics     Alcohol use: Yes     Comment: 3x/day wine or jodi     Drug use: No       Tiffany Alejo CMA, ALESHA  2019  1:59 PM

## 2019-09-24 NOTE — LETTER
9/24/2019       RE: Odalys Nathan  [address]  [facility] [address]     Dear Colleague,    Thank you for referring your patient, Odalys Nathan, to the [facility] FOR PROSTATE AND UROLOGIC CANCERS at [facility]. Please see a copy of my visit note below.    It was my pleasure to meet Ms. Odalys Nathan, a 59 year old year old female seen in consultation today for chief complaint: RECHECK (Hydronephrosis follow up)    HPI:   Odalys Nathan is a 59 year old female, Jehovah Witness, with progressive metastatic ovarian cancer on palliative chemotherapy who was admitted 9/9/19 -9/14 for nausea, anorexia, abdominal pain and found to have positive blood cultures (Klebsiella pneumonia), biliary obstruction (possible cholangitis) as well as CT evidence of new right hydronephrosis secondary to malignant obstruction.   Urology was consulted and offered renal drainage, but ultimately the patient elected for conservative mgmt given she was asymptomatic, her creatinine was at baseline and there was no evidence of UTI. Of note, she did undergo uneventful right frontal craniotomy for resection of metastatic brain lesion on 9/18 but was able to d/c 2 days later.     Today she returns in routine followup after undergoing renal ultrasound earlier today that shows mild to moderate right hydronephrosis.  Creatinine is 0.50mg/dL  Still on antibiotics. - ceftroaxpme x 1 more day.  Then will transition to orals  Trying to keep ahead of the nausea.  Does have some right flank pain.   Using oxycodone which causes constipation and is adding Miralax and stool softeners.   Appetite is returning.   GI still may potentially upsize a hepatic stent but she has no follow up coordinated and there are certainly no plans for OR with GI right now.    Will need brain radiation in followup after the resection.   Hoping to get more chemo and has an appt with Dr. Yeung next  week to further coordinate.   Has an appt with Oncology next week with Dr. Yeung to further coordinate.        Past Medical History:   Diagnosis Date     Antiplatelet or antithrombotic long-term use      Ascites      Blood clot in the legs      Diabetes (H)      Ovarian cancer (H)     serous,stg IV     Pleural effusion      Pulmonary embolism (H) 2/2012     Refusal of blood transfusions as patient is Buddhist      Short gut syndrome      Subclinical hypothyroidism 4/18/2013     Thrombosis of leg        Past Surgical History:   Procedure Laterality Date     COLECTOMY       COLONOSCOPY  2/1/2012    Procedure:COLONOSCOPY; With Biopsy; Surgeon:TONI SULLIVAN; Location:[facility] OR     ENDOSCOPIC RETROGRADE CHOLANGIOPANCREATOGRAM N/A 8/23/2019    Procedure: Endoscopic Retrograde Cholangiopancreatogram with 4 mm Hurricane Balloon Dilation, gallbladder stent and Bile Duct stent placements, Bilarry Sphincterotomy ;  Surgeon: Catrachito Lloyd MD;  Location: [facility] OR     HYSTERECTOMY TOTAL ABD, RHIANNA SALPINGO-OOPHORECTOMY, NODE DISSECTION, TUMOR DEBULKING, COMBINED  2/1/2012    Procedure:COMBINED HYSTERECTOMY TOTAL ABDOMINAL, BILATERAL SALPINGO-OOPHORECTOMY, NODE DISSECTION, TUMOR DEBULKING;  Exploratory Laparotomy, Total Abdominal Hysterectomy, Bilateral Salpingo-Oophorectomy, appendectomy,lysis of adhesions, ileal, ascending, transverse and splenic flexure resection, ileal descending bowel renanastomosis, incidental cystotomy repair, CUSA procedure and colonoscopy ; [facility]     INSERT PORT PERITONEAL ACCESS  4/3/2012    Procedure:INSERT PORT PERITONEAL ACCESS; Intraperitoneal Port Placement (c-arm); Surgeon:SAMUEL CARRASCO; Location:[facility] OR     INSERT PORT PERITONEAL ACCESS  5/14/2014    Procedure: INSERT PORT PERITONEAL ACCESS;  Surgeon: Nga Yeung MD;  Location: [facility] OR     INSERT PORT VASCULAR ACCESS       IR PORT REMOVAL RIGHT  9/20/2019     LAPAROSCOPY DIAGNOSTIC (GYN)  5/14/2014    Procedure: LAPAROSCOPY  DIAGNOSTIC (GYN);  Surgeon: Nga Yeung MD;  Location: [facility] OR     LAPAROTOMY EXPLORATORY Right 11/25/2015    Procedure: LAPAROTOMY EXPLORATORY;  Surgeon: Nga Yeung MD;  Location: [facility] OR     LAPAROTOMY, TUMOR DEBULKING, COMBINED  5/14/2014    Procedure: COMBINED LAPAROTOMY, TUMOR DEBULKING;  Surgeon: Nga Yeung MD;  Location: [facility] OR     OPTICAL TRACKING SYSTEM CRANIOTOMY, EXCISE TUMOR, COMBINED Right 9/18/2019    Procedure: Stealth Assisted Right Craniotomy And Tumor Resection;  Surgeon: Bertin Dewey MD;  Location: [facility] OR     PICC INSERTION Left 09/20/2019    5Fr - 46cm (4cm external), basilic vein, low SVC     REMOVE CATHETER PERITONEAL N/A 8/20/2014    Procedure: REMOVE CATHETER PERITONEAL;  Surgeon: Nga Yeung MD;  Location: [facility] OR     VASCULAR SURGERY      stent left iliac vein       FAMILY HISTORY: Denies family history of urologic cancer.     SOCIAL HISTORY:    reports that she quit smoking about 39 years ago. Her smoking use included cigarettes. She quit after 8.00 years of use. She has never used smokeless tobacco.    Current Outpatient Medications   Medication Sig Dispense Refill     albuterol (PROAIR HFA/PROVENTIL HFA/VENTOLIN HFA) 108 (90 Base) MCG/ACT inhaler Inhale 2 puffs into the lungs every 6 hours as needed for shortness of breath / dyspnea or wheezing 1 Inhaler 3     Ascorbic Acid (VITAMIN C PO) Take 500 mg by mouth daily        Calcium Carbonate-Vitamin D (CALCIUM + D PO) Take 1 tablet by mouth daily.       cefTRIAXone (ROCEPHIN) 2 GM vial Inject 2 g into the vein every 24 hours for 11 days 220 mL 0     clotrimazole 10 MG rell Take 1 Rell (10 mg) by mouth 5 times daily 70 Rell 0     cyanocobalamin (VITAMIN B12) 1000 MCG/ML injection Inject 1 mL (1,000 mcg) into the muscle every 30 days 1 mL 11     [START ON 9/27/2019] dexamethasone (DECADRON) 1 MG tablet Take 1 tablet (1 mg) by mouth every 8 hours for 3 days 9 tablet 0     dexamethasone (DECADRON)  1.5 MG tablet Take 2 tablets (3 mg) by mouth every 8 hours 8 tablet 0     dexamethasone (DECADRON) 2 MG tablet Take 1 tablet (2 mg) by mouth every 8 hours for 3 days 9 tablet 0     diphenoxylate-atropine (LOMOTIL) 2.5-0.025 MG per tablet Take 2 tablets by mouth 4 times daily as needed for diarrhea 60 tablet 0     dronabinol (MARINOL) 2.5 MG capsule Take 1 capsule (2.5 mg) by mouth 2 times daily (before meals) 60 capsule 3     Ferrous Sulfate 324 (65 Fe) MG TBEC TAKE ONE TABLET BY MOUTH THREE TIMES DAILY WITH MEALS. TAKE WITH A SMALL AMOUNT OF ORANGE JUICE. DO NOT TAKE WITH CALCIUM 90 tablet 11     fluconazole (DIFLUCAN) 150 MG tablet TAKE 1 TABLET (150 MG) BY MOUTH ONCE FOR 1 DOSE  0     HEMP OIL OR EXTRACT OR OTHER CBD CANNABINOID, NOT MEDICAL CANNABIS,        HERBALS daily        ipratropium - albuterol 0.5 mg/2.5 mg/3 mL (DUONEB) 0.5-2.5 (3) MG/3ML neb solution Take 1 vial (3 mLs) by nebulization every 6 hours as needed for wheezing or shortness of breath / dyspnea 3 Box 0     LEVOTHYROXINE SODIUM PO Take 50 mcg by mouth daily        loperamide (IMODIUM) 2 MG capsule Take 2 mg by mouth 4 times daily as needed for diarrhea       LORazepam (ATIVAN) 1 MG tablet Take 1 tablet (1 mg) by mouth every 6 hours as needed (Anxiety, Nausea/Vomiting or Sleep) 30 tablet 2     magnesium oxide (MAG-OX) 400 MG tablet Take 1 tablet (400 mg) by mouth 2 times daily 90 tablet 3     ondansetron (ZOFRAN-ODT) 4 MG ODT tab Take 1-2 tablets (4-8 mg) by mouth every 6 hours as needed for nausea or vomiting 20 tablet 0     order for DME Injection Supplies for Vitamin B12: 3cc syringes w/ 27 gauge needles, 1/2 inch length 3 each 0     oxyCODONE (ROXICODONE) 5 MG tablet Take 1-2 tablets (5-10 mg) by mouth every 6 hours as needed for moderate to severe pain 20 tablet 0     oxyCODONE (ROXICODONE) 5 MG tablet Take 1 tablet (5 mg) by mouth every 6 hours as needed for severe pain 10 tablet 0     pantoprazole (PROTONIX) 40 MG EC tablet Take 1 tablet  "(40 mg) by mouth daily 8 tablet 0     potassium chloride (KLOR-CON) 20 MEQ packet Take 20 mEq by mouth daily       prochlorperazine (COMPAZINE) 10 MG tablet Take 1 tablet (10 mg) by mouth every 6 hours as needed for nausea or vomiting 30 tablet 1     VITAMIN E NATURAL PO Take 100 Units by mouth daily         ALLERGIES: Patient has no known allergies.      REVIEW OF SYSTEMS:  As above in HPI    GENERAL PHYSICAL EXAM:   Vitals: BP 90/62   Pulse 91   Ht 1.651 m (5' 5\")   Wt 53.5 kg (118 lb)   BMI 19.64 kg/m     Body mass index is 19.64 kg/m .    GENERAL: Well groomed, well developed, well nourished female in NAD.  No CVAT bilaterally    NEURO: Alert and oriented x 3.  PSYCH: Normal mood and affect, pleasant and agreeable during interview and exam.    RADIOLOGY: The following tests were reviewed:   EXAMINATION: US RENAL COMPLETE, 9/24/2019 1:41 PM      COMPARISON: Abdominal ultrasound 8/14/2019      HISTORY: Hydronephrosis     FINDINGS:     Right kidney: Measures 10.9 cm in length. Parenchyma is of normal  thickness and echogenicity. No focal mass. Mild to moderate  right-sided hydronephrosis, slightly worse than 8/14/2019.     Left kidney: Measures 10.0 cm in length. Parenchyma is of normal  thickness and echogenicity. No focal mass. No hydronephrosis.      Bladder: Unremarkable.                                                                      IMPRESSION:  Mild-to-moderate hydronephrosis, slightly worse than comparison  ultrasound 8/14/2019.     I have personally reviewed the examination and initial interpretation  and I agree with the findings.     SHANNEN CANTOR MD    LABS: The last test results for Ms. Odalys Nathan were reviewed.   BMP -   Recent Labs   Lab Test 09/24/19  0835 09/21/19  0819 09/20/19  0532 09/19/19  1641 09/19/19  1211 09/19/19  0311   NA  --  134 136  --   --  136   POTASSIUM  --  5.6* 3.5  --  3.6 3.2*   CHLORIDE  --  104 104  --   --  105   CO2  --  23 23  --   --  25   BUN 7 6* 8  " --   --  9   CR 0.50* 0.42* 0.53  --   --  0.49*   GLC  --  171* 112*  --   --  128*   JOSE ALBERTO  --  8.3* 8.6  --   --  7.7*   MAG  --  1.8 1.4*  --  2.2 1.1*   PHOS  --  2.4* 2.0* 3.3  --  1.9*       CBC -   Recent Labs   Lab Test 09/24/19  0835 09/21/19  0819 09/20/19  0532   WBC 9.6 11.9* 13.1*   HGB 9.9* 9.4* 9.3*    308 315       ASSESSMENT:   1) Right hydronephrosis - d/t extrinsic compression/ malignancy     PLAN:   - On ceftriaxone currently.  No need for urine culture.   - Will arrange right ureteral stent placement with Dr. Malik Proctor, PA-C  Department of Urologic Surgery

## 2019-09-25 NOTE — DISCHARGE SUMMARY
[facility] Discharge Summary and Instructions    Odalys Nathan MRN# [id number]   Age: 59 year old YOB: 1960     Date of Admission:  9/18/2019  Date of Discharge::  9/21/2019  6:15 PM  Admitting Physician:  Bertin Dewey MD  Discharge Physician:  Bertin Dewey MD          Admission Diagnoses:   Brain Tumor  Brain tumor (H)          Discharge Diagnosis:   Brain Tumor  Brain tumor (H)          Procedures:   9/18/19: Stealth Assisted Right Craniotomy And Tumor Resection           Brief History of Illness:   Ms. Nathan is a 59-year-old Episcopal woman with past medical history of  metastatic ovarian cancer on chemotherapy, DVT/PE not on anticoagulation who presented to the [facility] ED on 9/9/19 with several weeks of headache, weight loss, and a few days of confusion and malaise, found to have a new right frontal brain tumor with vasogenic edema on CT head. Notably, she was also working through multiple other medical issues including hyponatremia to 128 and possible cholangitis. Had vomiting intermittently with chemotherapy, but nothing more than baseline.           Hospital Course:   Patient underwent above-mentioned procedure on 9/18/19. The operation was uncomplicated and she was admitted to the surgical ICU for routine post operative cares. Postoperative MRI demonstrated no definite residual tumor, though there was a small amount of hemorrhage and air in the resection cavity.  On post operative day 1, she was doing well and transferred to the floor. On post operative day 3, she underwent removal of her port and PICC placement.  On postoperative day 4, she was ambulating, voiding without a prince, eating a regular diet, pain was well controlled and therefore she was discharged home with dexamethasone taper and plan to follow up with Dr. Dewey in clinic.    Examination at time of discharge:  S: doing well post-operatively     O:  Exam:  General: Awake;  Alert, In No Acute  Distress  Pulm: Breathing Comfortably on room air  Mental status: Oriented x 3  Cranial Nerves: Cranial Nerves II-XII Intact Bilaterally  Strength: 5/5 in bilateral upper and lower extremities   Pronator Drift: Absent  Sensory: Intact to Light Touch  INCISION: c/d/i              Discharge Medications:     Discharge Medication List as of 9/21/2019  2:56 PM      START taking these medications    Details   !! dexamethasone (DECADRON) 1 MG tablet Take 1 tablet (1 mg) by mouth every 8 hours for 3 days, Disp-9 tablet, R-0, E-Prescribe      !! dexamethasone (DECADRON) 1.5 MG tablet Take 2 tablets (3 mg) by mouth every 8 hours, Disp-8 tablet, R-0, Local Print      !! dexamethasone (DECADRON) 2 MG tablet Take 1 tablet (2 mg) by mouth every 8 hours for 3 days, Disp-9 tablet, R-0, Local Print      ipratropium - albuterol 0.5 mg/2.5 mg/3 mL (DUONEB) 0.5-2.5 (3) MG/3ML neb solution Take 1 vial (3 mLs) by nebulization every 6 hours as needed for wheezing or shortness of breath / dyspnea, Disp-3 Box, R-0, Local Print      ondansetron (ZOFRAN-ODT) 4 MG ODT tab Take 1-2 tablets (4-8 mg) by mouth every 6 hours as needed for nausea or vomiting, Disp-20 tablet, R-0, Local Print      !! oxyCODONE (ROXICODONE) 5 MG tablet Take 1-2 tablets (5-10 mg) by mouth every 6 hours as needed for moderate to severe pain, Disp-20 tablet, R-0, Local Print       !! - Potential duplicate medications found. Please discuss with provider.      CONTINUE these medications which have CHANGED    Details   clotrimazole 10 MG rell Take 1 Rell (10 mg) by mouth 5 times daily, Disp-70 Rell, R-0, E-Prescribe         CONTINUE these medications which have NOT CHANGED    Details   albuterol (PROAIR HFA/PROVENTIL HFA/VENTOLIN HFA) 108 (90 Base) MCG/ACT inhaler Inhale 2 puffs into the lungs every 6 hours as needed for shortness of breath / dyspnea or wheezing, Disp-1 Inhaler, R-3, E-PrescribePharmacy may dispense brand covered by insurance (Proair, or proventil or  ventolin or generic albuterol inhaler)      Ascorbic Acid (VITAMIN C PO) Take 500 mg by mouth daily , Historical      Calcium Carbonate-Vitamin D (CALCIUM + D PO) Take 1 tablet by mouth daily., Historical      cefTRIAXone (ROCEPHIN) 2 GM vial Inject 2 g into the vein every 24 hours for 11 days, Disp-220 mL, R-0, E-Prescribe      cyanocobalamin (VITAMIN B12) 1000 MCG/ML injection Inject 1 mL (1,000 mcg) into the muscle every 30 days, Disp-1 mL, R-11, E-Prescribe      diphenoxylate-atropine (LOMOTIL) 2.5-0.025 MG per tablet Take 2 tablets by mouth 4 times daily as needed for diarrhea, Disp-60 tablet, R-0, Local Print      dronabinol (MARINOL) 2.5 MG capsule Take 1 capsule (2.5 mg) by mouth 2 times daily (before meals), Disp-60 capsule, R-3, E-Prescribe      Ferrous Sulfate 324 (65 Fe) MG TBEC TAKE ONE TABLET BY MOUTH THREE TIMES DAILY WITH MEALS. TAKE WITH A SMALL AMOUNT OF ORANGE JUICE. DO NOT TAKE WITH CALCIUM, Disp-90 tablet, R-11, E-Prescribe      fluconazole (DIFLUCAN) 150 MG tablet TAKE 1 TABLET (150 MG) BY MOUTH ONCE FOR 1 DOSE, R-0, Historical      HEMP OIL OR EXTRACT OR OTHER CBD CANNABINOID, NOT MEDICAL CANNABIS, Historical      HERBALS daily , Historical      LEVOTHYROXINE SODIUM PO Take 50 mcg by mouth daily , Historical      loperamide (IMODIUM) 2 MG capsule Take 2 mg by mouth 4 times daily as needed for diarrhea, Historical      LORazepam (ATIVAN) 1 MG tablet Take 1 tablet (1 mg) by mouth every 6 hours as needed (Anxiety, Nausea/Vomiting or Sleep), Disp-30 tablet, R-2, Local Print      magnesium oxide (MAG-OX) 400 MG tablet Take 1 tablet (400 mg) by mouth 2 times daily, Disp-90 tablet, R-3, Fax      order for DME Injection Supplies for Vitamin B12: 3cc syringes w/ 27 gauge needles, 1/2 inch lengthDisp-3 each, R-0, Local Print      !! oxyCODONE (ROXICODONE) 5 MG tablet Take 1 tablet (5 mg) by mouth every 6 hours as needed for severe pain, Disp-10 tablet, R-0, Local Print      pantoprazole (PROTONIX) 40 MG  EC tablet Take 1 tablet (40 mg) by mouth daily, Disp-8 tablet, R-0, E-Prescribe      potassium chloride (KLOR-CON) 20 MEQ packet Take 20 mEq by mouth daily, Historical      prochlorperazine (COMPAZINE) 10 MG tablet Take 1 tablet (10 mg) by mouth every 6 hours as needed for nausea or vomiting, Disp-30 tablet, R-1, E-Prescribe      VITAMIN E NATURAL PO Take 100 Units by mouth daily, Historical       !! - Potential duplicate medications found. Please discuss with provider.                  Discharge Instructions and Follow-Up:   Discharge diet: Regular   Discharge activity: You may advance activity as tolerated. No strenuous exercise or heay lifting greater than 10 lbs for 4 weeks or until seen and cleared in clinic.   Discharge follow-up: Follow-up with Dr. Bertin Dewey MD in 2 weeks   Wound care: Ok to shower,however no scrubbing of the wound and no soaking of the wound, meaning no bathtubs or swimming pools. Pat dry only. Leave wound open to air.  Sutures are not absorbable and need to be removed in 2 weeks. If patient still at rehab by this time, the sutures may be removed by the rehab physician if he or she considers that the wound has healed completely.     Please call if you have:  1. increased pain, redness, drainage, swelling at your incision  2. fevers > 101.5 F degrees  3. with any questions or concerns.  You may reach the Neurosurgery clinic at [phone] during regular work hours. ER at [phone].    and ask for the Neurosurgery Resident on call at [phone], during off hours or weekends.         Discharge Disposition:   Discharged to home

## 2019-09-25 NOTE — PROGRESS NOTES
This is a recent snapshot of the patient's [facility] medical record.  For current drug dose and complete information and questions, call [phone] or In Basket pool, [facility] home infusion ([id number])  CSN Number:  [id number]

## 2019-09-27 NOTE — PROGRESS NOTES
This is a recent snapshot of the patient's [facility] medical record.  For current drug dose and complete information and questions, call [phone] or In Basket pool, [facility] home infusion ([id number])  CSN Number:  [id number]

## 2019-10-01 NOTE — TELEPHONE ENCOUNTER
Spoke to patient in regards to scheduled procedure. Informed patient she is scheduled with Dr. Lloyd on 11/15/19. Informed patient she will need an updated pre-op physical within 30 days of her procedure. Patient stated she is going to have this done locally. Informed patient she will need a  and someone to monitor her for 24 hours after the procedure. Informed patient all scheduling details will be sent to her MyChart per her request.     10/1/19 101pm

## 2019-10-03 NOTE — ED TRIAGE NOTES
Blood drawn earlier today for a routine follow-up after brain surgery 09/18/2019 for metastatic ovarian cancer.   Called today to come to ED because Sodium 127,  Potassium 3.3.

## 2019-10-03 NOTE — TELEPHONE ENCOUNTER
Patient is scheduled for surgery with Dr. Gan      Spoke or left message with: Odalys    Date of Surgery: 10/4/19    Location: [facility]    Informed patient they will need an adult  yes    Pre-op with surgeon (if applicable): n/a    H&P: Scheduled with pcp    Additional imaging/appointments: n/a    Surgery packet: n/a     Additional comments: n/a

## 2019-10-03 NOTE — ED PROVIDER NOTES
History     Chief Complaint:  Abnormal Labs    HPI   Odalys Nathan is a 59 year old female, who per chart review has a history of metastatic ovarian cancer with mets to the brain status post craniotomy and tumor resection last month. During last month's admission, she was also diagnosed with Klebsiella pneumonia bacteremia and found to have possible cholangitis status post biliary stents. She was noted to have severe malnutrition.  She also has a history of diabetes, DVT and PE, not on anticoagulation. Patient was seen in oncology clinic today and labs were drawn. They are notable for sodium of 127 and potassium of 3.3. The patient reports she has a procedure tomorrow morning at the [facility] for a right renal stent placement secondary to kidney compression by a tumor, and her oncologist wants her to follow up on her labs before the procedure, prompting her presentation to the ED. Upon examination in the ED, the patient reports she has had ongoing abdominal pain but no significant change today. She reports she is taking oxycodone for the pain. She further notes she has been constipated. She denies vomiting and diarrhea. The patient denies having problems with low sodium in the past. She indicates she has a possible blood infection for which she is currently taking abx.        Allergies:  NKDA     Medications:    Albuterol inhaler  Augmentin   Clotrimazole   Lomotil  Marinol  Diflucan  Duoneb   Levothyroxine  Imodium  Ativan  Morphine  Narcan  Roxicodone   Compazine       Past Medical History:    Ascites  Blood clot  Diabetes  Ovarian cancre  Pleural effusion  PE  Short gun syndrome  Hypothyroidism  Partial small bowel obstruction  Metastatic cancer  Brain tumor   Stricture or kinking of ureter     Past Surgical History:    Colectomy  Colonoscopy  Endoscopic retrograde cholangiopancreatogram   Total hysterectomy, bilateral salpingo-oophorectomy, node dissection, tumor debulking, combined  Insert  port peritoneal access  Insert port vascular access  IR port removal right  Laparoscopy diagnostic (Gyn)  Laparotomy exploratory   Laparotomy, tumor debulking, combined  Optical tracking system craniotomy, excise tumor, combined  PICC insertion   Remove catheter peritoneal   Left iliac vein stent      Family History:    Lung cancer     Social History:  Presents with sister.  Former smoker, 8 years, quit 6/21/1980.  Positive for alcohol use.    Marital Status:   [2]     Review of Systems   Gastrointestinal: Positive for abdominal pain and constipation. Negative for diarrhea and vomiting.   All other systems reviewed and are negative.      Physical Exam     Patient Vitals for the past 24 hrs:   BP Temp Temp src Heart Rate Resp SpO2 Weight   10/03/19 1837 103/65 98.1  F (36.7  C) Oral 96 20 100 % 53.4 kg (117 lb 11.6 oz)      Physical Exam  VS: Reviewed per above  HENT: Mucous membranes moist  EYES: sclera anicteric  CV: Rate as noted, regular rhythm.   RESP: Effort normal. Breath sounds are normal bilaterally.  GI: general mild tenderness without rebound/guarding, not distended.  NEURO: Alert, moving all extremities  MSK: No deformity of the extremities  SKIN: Warm and dry    Emergency Department Course     Laboratory:  BMP: Glucose 107 (H), Sodium 130 (L), o/w WNL (Creatinine: 0.61)     CBC: WBC: 10.3, HGB: 11.2 (L), PLT: 263     Interventions:  1910 NS 1L IV BOLUS   Klorcon packet 40 mEq PO    Emergency Department Course:  Nursing notes and vitals reviewed. 1900 I performed an exam of the patient as documented above.     IV inserted. Medicine administered as documented above. Blood drawn. This was sent to the lab for further testing, results above.    1950 I rechecked the patient and discussed the results of her workup thus far.     Findings and plan explained to the Patient and sister. Patient discharged home with instructions regarding supportive care, medications, and reasons to return. The importance of  close follow-up was reviewed.     I personally reviewed the laboratory results with the Patient and sister and answered all related questions prior to discharge.    Impression & Plan      Medical Decision Making:  Patient presents to the ER for evaluation of abnormal lab work in clinic today.    Per chart review, patient has a history of metastatic ovarian cancer with mets to the brain status post craniotomy and tumor resection last month.  During last month's admission, she was also diagnosed with Klebsiella pneumonia bacteremia and found to have possible cholangitis status post biliary stents.  She was noted to have severe malnutrition.  She also has a history of DVT and PE not on anticoagulation.  Patient was seen in oncology clinic today and labs were drawn.  There are notable for sodium of 127 and potassium of 3.3.    Vital signs are unremarkable.  Exam is reassuring without evidence of significant dehydration.  Patient is tolerating p.o.  She does not expect to be admitted but rather wants to make sure that she is optimized for her urologic procedure tomorrow.  Sodium here in the ER was 130 potassium was 3.5.  She was given 1 L IV fluids and oral potassium.  No new symptoms today and abdominal pain appears at baseline.  I recommended she recheck her electrolytes with her PCP in the coming days.  Close return precautions were discussed prior to discharge.    Diagnosis:    ICD-10-CM    1. Hyponatremia E87.1      Disposition:  discharged to home    Scribe Disclosure:  I, Anna King, am serving as a scribe on 10/3/2019 at 7:14 PM to personally document services performed by Anderson Mishra MD based on my observations and the provider's statements to me.      Anna King  10/3/2019   [facility] EMERGENCY DEPARTMENT       Anderson Mishra MD  10/04/19 0026

## 2019-10-03 NOTE — LETTER
10/3/2019       RE: Odalys Nathan  [id number]  [facility] [id number]     Dear Colleague,    Thank you for referring your patient, Odalys Nathan, to the [facility]. Please see a copy of my visit note below.                             Follow Up Notes on Referred Patient    Date: October 3, 2019            RE: Odalys Nathan  : 1960  HOLA: October 3, 2019    Odalys Nathan is a 58 year old woman with a diagnosis of recurrent stage IIIC bilateral ovarian cancer. She is here today for follow up and disease management following neurosurgery.     Oncology History:  2012 - Admitted to hospital for 2 weeks of intermittent abdominal cramping, distention, diarrhea and N/V. CT of abdomen/pelvis significant for small bowel obstruction, a heterogenous soft tissue density in the pelvis, omental nodules, and ascites. Bilateral adnexal masses per U/S of pelvis. CA-125 was elevated at 987, CEA was normal at 1.0.    12 - Therapeutic paracentesis (4 L) with cytology confirming malignancy (IL positive, weak ER positive, CK-7 positive consistent with GYN primary). Surgery recommended d/t potential for falling blood counts 2/ chemotherapy (patient is Taoist and limiting blood transfusion)    12 - Exploratory laparotomy, MAR BSO, lysis of adhesion, Appendectomy, Repair cystotomy, Omentectomy Vyra-qqsnkb-pkeyjmnff-transverse-colon resection, Ileo-descending colon anastomosis, CUSA, & Colonoscopy (done by Dr. Amato and colorectal team).  2012 - Admitted to hospital for bilateral pulmonary emboli and drainage of pleural effusion. Started on Lovenox.  12-3/21/12: Cycle #1-2 Carbo/Taxol IV  3/29/12 - Started on Keflex by her PCP for infection in her healing wound (immediately below her umbilicus).    12-12: Cycle #3-6 IV chemo, Cycle #1 IV/IP chemo  12 -  8, CT PRADEEP - enrolled in  - observation arm  3/4/13-13:  6,  9, 12, 15, 20.  7/1/13: CT Chest, Abdomen, Pelvis IMPRESSION:  1. Worsening metastatic ovarian carcinoma suggested by increased size of soft tissue nodules anterior to the right psoas muscle, that may represent growing mesenteric lymphadenopathy.  2. Remaining prominent right lower quadrant mesenteric lymph nodes are not significantly changed from CT 7/16/2012.  3. Clustered nodular opacities in the right lower lobe are not significant change from 7/16/2012 and remain indeterminate. Again, the appearance and distribution is suggestive of an infectious etiology.  4. Stable 8 mm soft tissue nodule in left breast, unchanged since at least 02/20/2012. This can be observed on followup studies, but correlation with mammography could be considered.  Decision made to start Doxil/Carbo.  7/11/13: The left ventricular ejection fraction is normal at 66.4%.  7/12/13-9/6/13: Cycle #1-3 Doxil/Carbo.  10, 11.    9/30/13 CT C/A/P Impression:  1. Overall, favorable response to treatment with decreasing size of soft tissue nodules tracking along the anterior aspect of the right psoas muscle.  2. 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  3. Improved cluster of right lower lobe pulmonary nodular opacities. These may represent resolving infection.  10/3/13-12/9/13:  9, 8, 10. Cycle 4-6 Doxil/Carbo.    1/13/14 CT C/A/P Impression:   1. Stable appearance of metastatic ovarian cancer. Scattered soft tissue nodules along the anterior aspect of the right psoas muscle are unchanged in size. Mild mesenteric lymphadenopathy is unchanged.  2. Clustered micronodules in the right lower lobe are unchanged from 9/30/2013, but improved from 7/1/2013. This history suggests a postinflammatory/postinfectious etiology.  3. 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  1/16/14 Discussed multiple options for her based on relatively stable-appearing disease on CT but slight increase in  (which has had small increase to 20 with  last recurrence) including chemo break with recheck of  in 1 month, starting new chemo agent immediately, and exploratory surgery with possible resection of nodules. She is considered platinum-sensitive based on > 1 year remission after Taxol/Carbo, which we will take into consideration for future chemo planning. I am not inclined to surgery at this time given difficulty she already has with diarrhea secondary to past colon resection. I suspect we would need to resect further bowel due to mesenteric disease. Also explained inherent risks of any major surgery. Also mentioned maintenance chemo, but this has not been shown to increase overall survival and would likely decrease her quality of life without significant benefit. Family is going on vacation to [place] in 2 weeks and Odalys does not want to have chemo prior to that, so will plan to take 1 month break. She can have  at that time (discussed checking toady since last draw was in early December, but as it would likely not change treatment plan and she has h/o slow rising , will not check today).  2/17/14  16    2/20/14: Decision to take break from chemo for two months, followed by CT and CA-125.    4/21/14  27  4/21/14 CT C/A/P Impression:    1. Increased size of 2 low-attenuation lymph nodes anterior to the right psoas muscle is concerning for worsening metastatic ovarian cancer.    2. New circumferential thickening of a 3.8 cm length segment of distal transverse colon is likely physiologic. Recommend attention on followup imaging.    3. Grossly unchanged size of clustered small nodules versus scarring in the right lower lobe the lungs.    4. Stable thrombosis of the right ovarian vein.  5/14/14: Diagnostic laparoscopy converted to exploratory laparotomy and removal of mesenteric masses, tumor debulking, peritoneal biopsies and intraperitoneal port placement. On laparoscopy, it was noted that there were small nodules on the  "anterior abdominal wall near the previous incision, small nodules on the right pelvic sidewall as well. Nodules were palpated in the mesentery; however, as it was unable to clarify where the origin of the nodules was, the decision was made to open the patient. On opening there was found to be approximately a 3 cm nodule in the small bowel mesentery and another separate approximately 2 cm nodule in the bowel mesentery. Pelvis without evidence of cancer, some mesenteric lymph nodes were palpated. No evidence otherwise of any disseminated cancer throughout the abdomen.    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 (2/2)  -Largest metastasis: 1.3 cm  -See comment  D: Peritoneum, right paracolic gutter #2, biopsy:  -Fibroadipose tissue with granulomatous inflammation surrounding refractile material  -Negative for malignancy   E: Small bowel adhesion, biopsy:  -Fibroconnective tissue, consistent with adhesion  -Negative for malignancy  F: Lymph nodes, mesentry, not otherwise specified, excision:  -Two lymph nodes, negative for metastatic carcinoma (0/2)  G: Lymph node, mesentery, \"#2\", excision:  -One lymph node, negative for metastatic carcinoma (0/1)  H: Lymph nodes, mesentery, \"nodule #2\", excision:  -Five lymph nodes, negative for metastatic carcinoma (0/5)  COMMENT:  Some of the specimens show post-operative changes. Others show possible treatment related changes, including necrosis. The metastatic carcinoma in the mesenteric lymph nodes (specimen C) shows variable morphology, including relatively low grade tumor with papillary architecture, and high grade tumor comprised " of nests of tumor cells with irregular, slit-like spaces and marked nuclear pleomorphism.    5/29/14: Cycle 1 IV PACLitaxel / IP CISplatin / IP PACLitaxel.  - 28.  6/26/14: Cycle #2 IV/IP.  10  8/5/14: CT chest/abd/pelvis IMPRESSION     1. In this patient with ovarian cancer, overall findings are indicative of stable/slight improvement, as multiple mesenteric lymphadenopathy and scattered nodular peritoneal soft tissue mass lesions appear unchanged or slightly smaller since 4/21/2014.    2. 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 Mild dilatation of the second and the third portion of the duodenum with a narrow SMA angle. This could represent SMA syndrome, if clinically correlated.17/14:    Cycle #3 IV/IP.  10.    CT chest/abd/pelvis with contrast on 8/5/14    Impression:    1. In this patient with ovarian cancer, overall findings are indicative of stable/slight improvement, as multiple mesenteric lymphadenopathy and scattered nodular peritoneal soft tissue mass lesions appear unchanged or slightly smaller since 4/21/2014.    2. 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 Mild dilatation of the second and the third portion of the duodenum with a narrow SMA angle. This could represent SMA syndrome, if clinically correlated.  8/7/14: Cycle #4 Taxol/Carbo (changed from IV/IP).  10. She has been feeling okay. She is unsure if she can finish out the course of 6 cycles IV/IP taxol/cisplatin. She feels like she has the flu for about a week then starts feeling gradually better after each chemo cycle. Her spouse notes that she actually has been more sick with the treatments than she initially admits here. She was also previously having some rib pain. Denies any rib pain now. Denies any chest pain or shortness of breath.  Plan: discussed recent CT cap results and switching to just IV as she is feeling miserable with IP treatments. Switch to IV carbo/taxol as patient  is platinum sensitive.  We also discussed her taking part of the tesaro trial, which would require BRCA testing. She would like to take part in this trial if eligible.  8/20/14: Remove Intraperitoneal Port ( Port and catheter intact - discarded)  8/28/14: Cycle #5 Taxol/Carbo held due to thrombocytopenia.  6.    She denies any vaginal bleeding, no changes in her bowel or bladder habits, no nausea/emesis, no lower extremity edema, and no difficulties eating or sleeping. She denies any abdominal discomfort/bloating, no fevers or chills, and no chest pain or shortness of breath. She states her diarrhea is the same. She reports some fatigue which improves about 1-2 weeks after her chemotherapy. She states she does not need any medication refills and she was told she does not meet the criteria for the TESARO trial. She states she has 3 bags of iv fluids left over from her previous chemotherapy and will give these to herself. She states she is ready for her treatment today.    9/29/14: Cycle #6 Taxol/Carbo  6. Insurance questions regarding GSF coverage today. No concerns other than fatigue. Taking iron for anemia and does not desire blood transfusion. Using neulasta for neutropenia. Using home IV hydration if needed. Baseline unchanged. No abdominal bloating, constipation, diarrhea, pain, vaginal or rectal bleeding, cough or dyspnea, fluid retention.    10/16/14: Impression:    1. Nodular peritoneal soft tissue mass in the right lower quadrant adjacent to the psoas muscle is no longer appreciated. Adjacent prominent lymphadenopathy is unchanged from previous exam. No new peritoneal lesions.    2. 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     10/20/14:  5. CT chest/abdomen/pelvis on 10/16/14 showed nodular peritoneal soft tissue mass in the right lower quadrant adjacent to the psoas muscle is no longer appreciated. Adjacent prominent lymphadenopathy is unchanged from previous exam. 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  1/27/15:  6.  4/28/15:  14.  5/26/15:  18.  6/2/15: CT cap Impression:  1. Postsurgical changes of hysterectomy and bilateral salpingo-oophorectomy for ovarian cancer. There is a new 8 mm hazy, ill-defined hypoattenuating lesion in hepatic segment 6 which is suspicious for a metastatic deposit. Further evaluation with ultrasound in recommended.    2. Increased size of a left retroperitoneal lymph node which is indeterminate but may represent a ciro metastasis. Mildly prominent lymph nodes in the right lower quadrant are not significantly changed.  3. Moderate colonic stool burden.    6/4/15: US abdomen IMPRESSION:    Hyperechoic lesion in the right hepatic lobe, consistent with hemangioma. This does not corresponds to the area of the lesion seen on CT from 6/2/2015. An MR would be helpful for identifying and characterizing the lesion from the recent CT.  6/12/15: MR abd IMPRESSION:  1. New 20 x 11 mm enhancing lesions between the right obliques, concerning for metastatic disease. This lesions should be amenable to percutaneous biopsy, if indicated.  2. Correlating to the lesion visualized on comparison CT is a hepatic segment 6 subcapsular 7 mm lesion. Overall the appearance favors the diagnosis of a simple cyst. However, there is faint suggestion of mild peripheral arterial enhancement. Although this is favored as  artifactual, this should be followed up to confirm stability. Recommend 6 month followup.  3. Hepatic segment 6, 5 mm lesion too small to technically characterize. Differential would favor FNH, less likely flash filling hemangioma. Recommend attention on followup.  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      9/1/15: Cycle #1 Avastin/Cytoxan.   31.       9/24/15:feeling generally well. She says she has been having back and stomach spasms. She is taking cytosine daily (she ran out yesterday). She also says its affecting her voice. Admits that it burns occasionally. She is eating and drinking normal. She also admit diarrhea, 5-7 times daily, lose/watery. She trying to stay hydrated and eat fiber. She also says that her body is sore, especially the bottom of her feet. Her blood pressure is normal. She also admits having headache after her first infusion.       9/24/15: Cycle #2 Avastin/Cytoxan.  19.  10/15/15: Cycle #3 Avastin/Cytoxan.  16.      11/6/15: CT c/a/p IMPRESSION:    1. Stable postoperative change of MAR/BSO for ovarian cancer.  2. The lesion in the right flank abdominal musculature is slightly decreased in size. Otherwise, stable examination.  2. No evidence of metastatic disease in the chest.     11/20/15: Treatment planning visit,  16     11/25/15: surgical pathology report  FINAL DIAGNOSIS:  Soft tissue, right oblique muscle mass, excision:  -Recurrent ovarian serous carcinoma  -Carcinoma is present less than 1 mm from one resection margin  -Background skeletal muscle and fibroadipose tissue     1/4/16:  22  1/11/16-1/27/16: Radiation to right flank x 12 treatments  4/7/2016:  94. CT cap IMPRESSION:    In this patient with ovarian cancer status post MAR/BSO and descending/transverse colectomy:  1. No evidence for malignancy in the chest, abdomen, or pelvis.  2. Stable small hypodense segment 6 liver lesion, appears more likely benign, possibly a cyst.  5/13/16:  124.  6/3/16: PET CT IMPRESSION: In this patient with a history of ovarian cancer:  1. Hypermetabolic and enlarging periaortic and perihepatic lymphadenopathy compatible with metastatic disease, as detailed above.  2. Although hypodense lesion in hepatic segment 6 has been present since 6/2/2015 associated hypermetabolism makes this lesion highly  concerning for metastatic disease.     Plan: to start Niraparib under TESARO study.      6/9/16:  137.  6/14/16: Cycle #1 Niraparib.    6/28/16: Cycle #1 D15 Niraparib.    7/5/16:  100.    7/11/16: Cycle #2 Nraparib.   83.     8/3/2016: PET CT IMPRESSION:    In this patient with known history of ovarian cancer:  1) New pleural based nodular opacities in the lateral and inferior aspects of the bilateral lower lobes, worse in the left lung. Likely infection. Close follow up is recommended.      2) Slight decrease in hypermetabolic abdominal lymphadenopathy. 2 hypermetabolic lymph nodes persist.  3) Unchanged right hepatic lobe metastatic lesion.      8/9/16: Cycle #3 Niraparib.  69.  9/6/16: Cycle #4 Niraparib.  53. Dose held due to anemia.  9/13/16: Eval for potential cycle 4 niraparib. Dose held due to anemia.  10/4/16: CT CAP impression:  IMPRESSION: In this patient with a known history of ovarian cancer:  1. There has been interval resolution of pleural-based nodular  opacities which likely represented infection.  2. Abdominal lymphadenopathy in the form of 2 portacaval lymph nodes  have not significantly changed in size, noted to be hypermetabolic on  prior PET/CT.  3. Previously demonstrated metastatic lesion in the right lobe of the  liver is not significantly changed.  10/11/16:  60  11/1/16: C6 niraparib,  75  11/29/16: C7 niraparib.  78. CT CAP impression as follows:  Target lesions (RECIST criteria):       A previously described target lesion superior to the head of the  pancreas (series 2, image 64)  (referred to as a perihepatic node on  6/3/2016) may not be a valid target lesion because it measured less  than 1.5 cm originally. However, this particular node has decreased in  size, now measuring 7 mm in short axis versus 14 mm on 6/3/2016 when  measured in a similar fashion.       2.3 cm short axis portacaval lymph node on series 2 image 67,  previously 2.0  cm on 10/4/2016.       1.2 cm subtle hypodensity in hepatic segment 6 on series 2 image 75,  stable on multiple studies since at least 6/3/2016     Sum of diameters today: 3.5 cm. Sum of diameters 10/4/2016: 3.2 cm.  Growth = 9%.    12/27/16: C8 niraparib.  105.  1/25/17: C9 niraparib.  108.  2/23/17: C10 niraparib.  132.   CT CAP impression:  Sum of target lesion diameters today: 3.7 cm. Sum of target lesion  diameters on 11/28/2016: 3.5 cm. Growth= 6%  1. In this patient with history of ovarian cancer there is stable  disease by RECIST criteria as evidenced by:   1a. Mildly increased size of liver metastasis.  1b. Stable portacaval lymphadenopathy.  1c. No evidence of metastatic disease in the chest.  2. Trace emphysematous changes of the lungs.  3/22/17: C11 niraparib.  132.  4/19/17: C12 niraparib.  127.  5/16/17: CT CAP IMPRESSION: In this patient with ovarian cancer:  1. Mildly increased size of hepatic metastasis segment 6 with subtle increased capsular retraction.  2. Stable edmundo hepatis nodes, with mild increase in size of periaortic lymph nodes.  3. Prominent left supraclavicular lymph node with subtle increase in size compared to prior studies, particularly comparing to 10/4/2016.  4. Mild subtle groundglass opacities in the right upper lobe, not present on prior study, lesser extent in the right lower lobe.  Findings may represent infection, additional consideration is malignancy (less likely), and attention on follow-up study  Recommended.  Addendum:   Prominent left supraclavicular lymph node (3/25) is stable from most recent CT performed 2/20/2017, currently measuring 14 x 16 mm, previously 14 x 16 mm on 2/20/2017.      The portal caval lymph node/edmundo hepatis lymph node is stable from 2/20/2017, measuring 21 mm.      Clarification of size of the para-aortic lymph node (series 3 image 327). It short axis measurement is 11 mm versus 9 mm on prior study.      Hepatic  segment 6 triangular-shaped low density lesion (3/362) measures 14 mm, previously measured 12 mm, demonstrating a  possible/questionable minimal subtle increase in size. Similarly the lymph nodes noted above in the supraclavicular and para-aortic regions demonstrate possible minimal possible subtle increase in size.      5/18/17: Cycle #13 Niraparib.  191.  6/15/17: Cycle #14 Niraparib.  146.  7/13/17: Cycle #15 Niraparib 200 mg.  171     CT (8/9/17):       IMPRESSION:  1. Segment 6 hepatic metastasis is stable to minimally increased in size.  2. Slight increase in multicentric adenopathy, most pronounced at the edmundo hepatis. Additional sites include the inferior left neck/supraclavicular region and retroperitoneum which appear stable to  minimally increased.      8/10/17:  175  9/14/17: MUGA LVEF 54%  9/22/17: C1D1 carboplatin/Doxil.  187.  10/19/17: C2D1 carboplatin/Doxil.  108.  11/17/17: C3D1 carboplatin/Doxil.  82.  12/14/17: C4D1 carboplatin/Doxil/avastin.  83.  Of note, avastin held on C4D14  1/12/18: C5D1 carboplatin/Doxil/avastin.  80.  1/26/18: platelets 61, patient was not given avastin due to being jehova's witness.   2/9/18: C6D1 carboplatin/Doxil/Avastin.   71.  3/2/18:  49. Three month treatment break.    6/20/2018:  170. CT CAP:  IMPRESSION: In this patient with history of ovarian cancer:  1. Increased size of a right hepatic lobe lesion and numerous edmundo hepatis and retroperitoneal lymph nodes compatible with progression of metastatic disease.  2. New mild intrahepatic biliary ductal dilatation, periportal edema, and pericholecystic fluid. Increased soft tissue fullness in the edmundo hepatis in the expected location of the common hepatic duct. Findings  are suspicious for developing biliary ductal obstruction secondary to worsening metastatic disease in the edmundo hepatis. Correlation with liver function tests is recommended.  Right upper quadrant ultrasound  may be beneficial.  3. Unchanged left supraclavicular and right hilar lymphadenopathy in the chest.      8/3/18: C1D1 weekly paclitaxel.  not done.  9/20/18: C2D1 weekly paclitaxel 80mg/m2. Ca 125-56  11/8/2018: C3D1 weekly paclitaxel;  26     12/17/18: Ct cap IMPRESSION:  1. New area of lobulated hypodense nodularity at the far-inferior right liver. This raises the possibility of a new area of hepatic metastasis.  2. Conversely, other nodules within the right liver are smaller suggesting improvement.  3. Improved adenopathy at the abdominal retroperitoneum. Improved left  supraclavicular adenopathy. Stable mildly prominent right hilar lymph nodes.  4. Previously noted ill-defined soft tissue at the edmundo hepatis region is still present but appears less prominent in size.  5. New finding of segmental wall thickening of the proximal sigmoid colon with some fluid distention of the adjacent colon. This could represent a segmental colitis. Other etiologies not excluded.    12/20/18:   19.  1/22/19:  45.  3/20/19: CT cap IMPRESSION:  1. Progression of disease with increasing size of a right inferior hepatic mass consistent with metastatic disease.  2. Increasing ill-defined multifocal regions of soft tissue at the edmundo hepatis consistent with malignant adenopathy versus malignant implants. Associated increased intrahepatic biliary ductal dilatation.  3. Other areas of progressive adenopathy noted at the left neck base, mediastinum, and abdominal retroperitoneum.  4. New area of carcinomatosis medial to stomach at the left upper abdomen.    3/20/19: Cycle #1 Weekly Paclitaxel.   180.  5/7/19: Cycle #2 Weekly Paclitaxel.    142.  6/20/19: Cycle #3, Day #1Weekly Paclitaxel/  pending  6/27/2019: C3, D8  7/3/19: C3 D15  7/11/19: C3; D22    8/8/2019: Gemzar C1 D1  Gemzar Day8  8/29/2019: Gemzar C2 D1  9/5/19: Day 8    7/11/19: CT CAP-IMPRESSION:  1.  Slight increased size of the left supraclavicular lymph node.  Slight progression of the mediastinal lymph nodes is also evident. New  periesophageal node as described above. Retroperitoneal nodes are  stable if not slightly smaller. Some of these nodes appear to have  partially calcified suggesting a response to interval therapy.  2. Interval decreased size of the inferior right hepatic lobe lesion  now with an area of central calcification or enhancement. No new liver  lesions. Remaining abdominal organs are grossly unremarkable. No  significant hydronephrosis.  3. Postop changes involving the bowel and pelvic region. No recurrent  pelvic mass.    presented to the [facility] ED on 9/9/19 with several weeks of headache, weight loss, and a few days of confusion and malaise, found to have a new right frontal brain tumor with vasogenic edema on CT head.     9/9/2019: CT chest-                                                                 IMPRESSION:   1. No evidence of acute pulmonary embolism.  2. New small-to-moderate right and trace left pleural effusions with  associated atelectasis.  3. Progression of metastatic disease in the chest, including worsening  mediastinal, hilar, and supraclavicular adenopathy. Increased pleural  thickening at the right apex also concerning for developing pleural  carcinomatosis, possibly contributing to the pleural effusions.      [Urgent Result: Worsening metastatic disease.]    CT A/P:   IMPRESSION: In this patient with a history of ovarian cancer, findings  are concerning for progression of disease since 7/11/2019:  1. Larger mass in inferior segment 6 in the liver, and increased  infiltrative tissue in the edmundo hepatis which extends along the right  portal portal branches and bile ducts, and inferiorly to the duodenum,  which is potentially now involved by tumor as there is new focal wall  edema and hypoenhancement at this location, versus postprocedural  edema.  1a. The tumoral  progression is in keeping with rising CA-125 levels.  2. Increased mass effect on the extrahepatic portal vein, with  unchanged cavernous transformation and multiple collaterals.  3. New biliary stents. Mildly increased intrahepatic biliary ductal  dilatation. Increased pneumobilia, nonspecific given stents.  Cholangitis is not excluded given potentially worsening obstruction.  4. New moderate right hydronephrosis and hydroureter. The right ureter  appears potentially tethered to new enhancing tissue associated with  the right ovarian vein concerning for focal worsening metastatic  disease.  5. Mildly worsened retroperitoneal and mesenteric adenopathy.  6. New small volume ascites.  7. New mildly prominent loops of small bowel with thickened walls in  the lower central abdomen. No transition point to suggest obstruction.  Findings may be secondary to venous congestion given portal findings  and worsening ascites, or possibly enteritis.     9/12/19: MRI Brain-    Findings: Limited imaging for stereotactic imaging demonstrates a  peripherally enhancing mass centered in the right frontal lobe  measuring approximately 5.5 x 4.6 x 4.8 cm. No hydrocephalus. The  major intracranial arterial structures are grossly patent.    9/18/19: Stealth Assisted Right Craniotomy And Tumor Resection  SPECIMEN(S):   A: Right brain tumor   B: Right brain tumor     FINAL DIAGNOSIS:   A) Brain, right tumor, biopsy:        - Metastatic adenocarcinoma     B) Brain, right tumor, excision:        - Metastatic adenocarcinoma     9/18/2019: MRI brain-                                                                 Impression:  1. New postoperative changes of right frontal craniotomy for resection  of right frontal mass. Expected postoperative hemorrhage and  pneumocephalus in the resection site  2. The hemorrhage along the surgical margins decreases the sensitivity  of the postcontrast sequences. No definite residual tumor, although  there is the  question of mild nodularity adjacent to the resection  margin anteromedially. Follow-up recommended.       To start radiation (gamma treatment) on 10/7/19 of surgical cavity  Stent placement planned tomorrow.    Today:   Taking oxycodone 15 mg and tylenol. Taking oxycodone 15 mg q 6 hours due to abdominal pain. Does not eat when has abdominal pain. Poor po intake. Oxycodone makes her sleepy.           Date Value Ref Range Status   08/29/2019 548 (H) 0 - 30 U/mL Final     Comment:     Assay Method:  Chemiluminescence using Siemens Centaur XP   08/08/2019 446 (H) 0 - 30 U/mL Final     Comment:     Assay Method:  Chemiluminescence using Siemens Centaur XP   07/24/2019 425 (H) 0 - 30 U/mL Final     Comment:     Assay Method:  Chemiluminescence using Siemens Centaur XP   07/15/2019 340 (H) 0 - 30 U/mL Final     Comment:     Assay Method:  Chemiluminescence using Siemens Centaur XP   06/20/2019 259 (H) 0 - 30 U/mL Final     Comment:     Assay Method:  Chemiluminescence using Siemens Centaur XP   05/07/2019 142 (H) 0 - 30 U/mL Final     Comment:     Assay Method:  Chemiluminescence using Siemens Centaur XP   03/20/2019 180 (H) 0 - 30 U/mL Final     Comment:     Assay Method:  Chemiluminescence using Siemens Centaur XP   03/11/2019 147 (H) 0 - 30 U/mL Final     Comment:     Assay Method:  Chemiluminescence using Siemens Centaur XP   01/22/2019 45 (H) 0 - 30 U/mL Final     Comment:     Assay Method:  Chemiluminescence using Siemens Centaur XP   12/20/2018 19 0 - 30 U/mL Final     Comment:     Assay Method:  Chemiluminescence using Siemens Centaur XP           Review of Systems:  Answers for HPI/ROS submitted by the patient on 9/30/2019   General Symptoms: No  Skin Symptoms: No  HENT Symptoms: No  EYE SYMPTOMS: No  HEART SYMPTOMS: No  LUNG SYMPTOMS: No  INTESTINAL SYMPTOMS: Yes  URINARY SYMPTOMS: No  GYNECOLOGIC SYMPTOMS: No  BREAST SYMPTOMS: No  SKELETAL SYMPTOMS: No  BLOOD SYMPTOMS: No  NERVOUS SYSTEM SYMPTOMS:  No  MENTAL HEALTH SYMPTOMS: No  Heart burn or indigestion: Yes  Nausea: Yes  Vomiting: Yes  Abdominal pain: Yes  Bloating: Yes  Constipation: Yes  Diarrhea: No  Blood in stool: No  Black stools: No  Rectal or Anal pain: No  Fecal incontinence: No  Yellowing of skin or eyes: No  Vomit with blood: No  Change in stools: No    ROS reviewed with patient.    Past Medical History:    Past Medical History:   Diagnosis Date     Antiplatelet or antithrombotic long-term use      Ascites      Blood clot in the legs      Diabetes (H)      Ovarian cancer (H)     serous,stg IV     Pleural effusion      Pulmonary embolism (H) 2/2012     Refusal of blood transfusions as patient is Yazdanism      Short gut syndrome      Subclinical hypothyroidism 4/18/2013     Thrombosis of leg          Past Surgical History:    Past Surgical History:   Procedure Laterality Date     COLECTOMY       COLONOSCOPY  2/1/2012    Procedure:COLONOSCOPY; With Biopsy; Surgeon:TONI SULLIVAN; Location:[facility]     ENDOSCOPIC RETROGRADE CHOLANGIOPANCREATOGRAM N/A 8/23/2019    Procedure: Endoscopic Retrograde Cholangiopancreatogram with 4 mm Hurricane Balloon Dilation, gallbladder stent and Bile Duct stent placements, Bilarry Sphincterotomy ;  Surgeon: Catrachito Lloyd MD;  Location: [facility] OR     HYSTERECTOMY TOTAL ABD, RHIANNA SALPINGO-OOPHORECTOMY, NODE DISSECTION, TUMOR DEBULKING, COMBINED  2/1/2012    Procedure:COMBINED HYSTERECTOMY TOTAL ABDOMINAL, BILATERAL SALPINGO-OOPHORECTOMY, NODE DISSECTION, TUMOR DEBULKING;  Exploratory Laparotomy, Total Abdominal Hysterectomy, Bilateral Salpingo-Oophorectomy, appendectomy,lysis of adhesions, ileal, ascending, transverse and splenic flexure resection, ileal descending bowel renanastomosis, incidental cystotomy repair, CUSA procedure and colonoscopy ; [facility]     INSERT PORT PERITONEAL ACCESS  4/3/2012    Procedure:INSERT PORT PERITONEAL ACCESS; Intraperitoneal Port Placement (c-arm); Surgeon:SAMUEL CARRASCO;  Location:[facility]     INSERT PORT PERITONEAL ACCESS  5/14/2014    Procedure: INSERT PORT PERITONEAL ACCESS;  Surgeon: Nga Yeung MD;  Location: [facility] OR     INSERT PORT VASCULAR ACCESS       IR PORT REMOVAL RIGHT  9/20/2019     LAPAROSCOPY DIAGNOSTIC (GYN)  5/14/2014    Procedure: LAPAROSCOPY DIAGNOSTIC (GYN);  Surgeon: Nga Yeung MD;  Location: [facility]     LAPAROTOMY EXPLORATORY Right 11/25/2015    Procedure: LAPAROTOMY EXPLORATORY;  Surgeon: Nga Yeung MD;  Location: [facility]     LAPAROTOMY, TUMOR DEBULKING, COMBINED  5/14/2014    Procedure: COMBINED LAPAROTOMY, TUMOR DEBULKING;  Surgeon: Nga Yeung MD;  Location: [facility]     OPTICAL TRACKING SYSTEM CRANIOTOMY, EXCISE TUMOR, COMBINED Right 9/18/2019    Procedure: Stealth Assisted Right Craniotomy And Tumor Resection;  Surgeon: Bertin Dewey MD;  Location: [facility]     PICC INSERTION Left 09/20/2019    5Fr - 46cm (4cm external), basilic vein, low SVC     REMOVE CATHETER PERITONEAL N/A 8/20/2014    Procedure: REMOVE CATHETER PERITONEAL;  Surgeon: Nga Yeung MD;  Location: [facility]     VASCULAR SURGERY      stent left iliac vein         Health Maintenance Due   Topic Date Due     PREVENTIVE CARE VISIT  1960     HIV SCREENING  08/03/1973     HPV  08/03/1979     DTAP/TDAP/TD IMMUNIZATION (1 - Tdap) 08/03/1983     LIPID  08/03/2003     ZOSTER IMMUNIZATION (1 of 2) 08/03/2008     PNEUMOCOCCAL IMMUNIZATION 19-64 HIGHEST RISK (2 of 3 - PCV13) 10/17/2013     MAMMO SCREENING  02/01/2015     PAP  04/04/2018     PHQ-2  01/01/2019     INFLUENZA VACCINE (1) 09/01/2019       Current Medications:     Current Outpatient Medications   Medication Sig Dispense Refill     albuterol (PROAIR HFA/PROVENTIL HFA/VENTOLIN HFA) 108 (90 Base) MCG/ACT inhaler Inhale 2 puffs into the lungs every 6 hours as needed for shortness of breath / dyspnea or wheezing 1 Inhaler 3     amoxicillin-clavulanate (AUGMENTIN) 875-125 MG tablet Take 1 tablet by mouth  2 times daily 60 tablet 1     Ascorbic Acid (VITAMIN C PO) Take 500 mg by mouth daily        clotrimazole 10 MG rell Take 1 Rell (10 mg) by mouth 5 times daily 70 Rell 0     cyanocobalamin (VITAMIN B12) 1000 MCG/ML injection Inject 1 mL (1,000 mcg) into the muscle every 30 days 1 mL 11     diphenoxylate-atropine (LOMOTIL) 2.5-0.025 MG per tablet Take 2 tablets by mouth 4 times daily as needed for diarrhea 60 tablet 0     dronabinol (MARINOL) 2.5 MG capsule Take 1 capsule (2.5 mg) by mouth 2 times daily (before meals) 60 capsule 3     Ferrous Sulfate 324 (65 Fe) MG TBEC TAKE ONE TABLET BY MOUTH THREE TIMES DAILY WITH MEALS. TAKE WITH A SMALL AMOUNT OF ORANGE JUICE. DO NOT TAKE WITH CALCIUM 90 tablet 11     fluconazole (DIFLUCAN) 150 MG tablet TAKE 1 TABLET (150 MG) BY MOUTH ONCE FOR 1 DOSE  0     HEMP OIL OR EXTRACT OR OTHER CBD CANNABINOID, NOT MEDICAL CANNABIS,        HERBALS daily        LEVOTHYROXINE SODIUM PO Take 50 mcg by mouth daily        loperamide (IMODIUM) 2 MG capsule Take 2 mg by mouth 4 times daily as needed for diarrhea       LORazepam (ATIVAN) 1 MG tablet Take 1 tablet (1 mg) by mouth every 6 hours as needed (Anxiety, Nausea/Vomiting or Sleep) 30 tablet 2     magnesium oxide (MAG-OX) 400 MG tablet Take 1 tablet (400 mg) by mouth 2 times daily 90 tablet 3     oxyCODONE (ROXICODONE) 5 MG tablet Take 1-2 tablets (5-10 mg) by mouth every 6 hours as needed for moderate to severe pain 20 tablet 0     oxyCODONE (ROXICODONE) 5 MG tablet Take 1 tablet (5 mg) by mouth every 6 hours as needed for severe pain 10 tablet 0     pantoprazole (PROTONIX) 40 MG EC tablet Take 1 tablet (40 mg) by mouth daily 8 tablet 0     potassium chloride (KLOR-CON) 20 MEQ packet Take 20 mEq by mouth daily       prochlorperazine (COMPAZINE) 10 MG tablet Take 1 tablet (10 mg) by mouth every 6 hours as needed for nausea or vomiting 30 tablet 1     VITAMIN E NATURAL PO Take 100 Units by mouth daily       Calcium Carbonate-Vitamin  "D (CALCIUM + D PO) Take 1 tablet by mouth daily.       dexamethasone (DECADRON) 1.5 MG tablet Take 2 tablets (3 mg) by mouth every 8 hours (Patient not taking: Reported on 10/3/2019) 8 tablet 0     dexamethasone (DECADRON) 2 MG tablet Take 1 tablet (2 mg) by mouth every 8 hours for 3 days 9 tablet 0     ipratropium - albuterol 0.5 mg/2.5 mg/3 mL (DUONEB) 0.5-2.5 (3) MG/3ML neb solution Take 1 vial (3 mLs) by nebulization every 6 hours as needed for wheezing or shortness of breath / dyspnea (Patient not taking: Reported on 10/3/2019) 3 Box 0     ondansetron (ZOFRAN-ODT) 4 MG ODT tab Take 1-2 tablets (4-8 mg) by mouth every 6 hours as needed for nausea or vomiting 20 tablet 0     order for DME Injection Supplies for Vitamin B12: 3cc syringes w/ 27 gauge needles, 1/2 inch length 3 each 0         Allergies:      No Known Allergies     Social History:     Social History     Tobacco Use     Smoking status: Former Smoker     Packs/day: 0.00     Years: 8.00     Pack years: 0.00     Types: Cigarettes     Last attempt to quit: 1980     Years since quittin.3     Smokeless tobacco: Never Used     Tobacco comment: started at 11 yo and quit at 18 yo   Substance Use Topics     Alcohol use: Yes     Comment: 3x/day wine or jodi       History   Drug Use No         Family History:     The patient's family history is notable for:    Family History   Problem Relation Age of Onset     Cancer Mother 69        lung, smoker     Cancer Maternal Uncle 65        brain     Colon Cancer Maternal Aunt 80        colon         Physical Exam:     /75   Pulse 104   Temp 98.3  F (36.8  C) (Oral)   Resp 14   Ht 1.651 m (5' 5\")   Wt 53.2 kg (117 lb 4.8 oz)   SpO2 98%   BMI 19.52 kg/m     Body mass index is 19.52 kg/m .    General Appearance: healthy and alert, no distress     HEENT: no thyromegaly, no palpable nodules or masses        Cardiovascular: regular rate and rhythm, no gallops, rubs or murmurs     Respiratory: lungs " clear, no rales, rhonchi or wheezes, normal diaphragmatic excursion    Musculoskeletal: extremities non tender and without edema    Skin: no lesions or rashes     Neurological: normal gait, no gross defects     Psychiatric: appropriate mood and affect                               Hematological: normal cervical, 3 x 2 cm left supraclavicular lymph node, firm     Gastrointestinal:       abdomen soft, non-tender, minimally distended, right sided fullness in abdomen, non-tender    Genitourinary: Deferred      Assessment:    Odalys Nathan is a 58 year old woman with a diagnosis of recurrent stage IIIC bilateral ovarian cancer. She is here today for follow up and disease management. Imaging suggests slight increase in size of lymph nodes.    50 minutes were spent with this patient, over 50% of that time was spent in symptom management, treatment planning and in counseling and coordination of care.      Plan:     1.)         Recurrent Stage IIIC ovarian cancer-now s/p resection of brain tumor. Metastatic disease in the abdomen.  -Could consider olaparib vs. Adding avastin to weekly taxol.   -PDL-1 is 0%  -Foundation one testing does not show targetable agent.  -Right hydronephrosis-planning JJ stent tomorrow  -11/18 biliary stent replacement planned: was on IV ceftriaxon 2 g IV until 9/25/2019 and then start Augmentin 875/125 mg po BID. Patient on that now, continue to cover during time up  To ERCP.  -MRI brain today  -Topotecan discussed weekly x 3 weeks on and one week off. Risks, benefits, and alternatives to chemotherapy discussed with the patient.  -Discussed single agent carbo followed by PARP I if she responds, some data for PARP after PARP I.  -port has been removed. Has follow up with ID, would like to discuss if port replacement is possible now. Dr. Hitchcock is seeing her.  -palliative care next week  -Miralax for constipation  -Will change her to MS Contin every 12 hours. Oxycodone 5 mg prn breakthrough  pain.  -Can take reflux meds she has at home  -Chemo to be done at [facility].    2.) Genetic risk factors were assessed and she is negative for mutations in BRCA1, BRCA2, EPCAM, MLH1, MSH2, MSH6, PMS2, PTEN, and TP53 genes.     3.) Labs and/or tests ordered include: Brain tumor to Foundation One.     4.) Health maintenance issues addressed today include annual health maintenance and non-gynecologic issues with PCP.    5.)        Continue to take K and Mg bid at home; to be replaced in infusion as needed.     Nga Yeung MD    Department of Ob/Gyn and Women's Health  Division of Gynecologic Oncology  [facility]  [phone]          CC  Patient Care Team:  Aubrie Hester MD as PCP - General  Tammy Flores, RN as Continuity Care Coordinator (Oncology)  Nga Yeung MD as MD (Oncology)  Patricia Rocha APRN CNP as Nurse Practitioner (Nurse Practitioner)

## 2019-10-03 NOTE — PATIENT INSTRUCTIONS
New chemo treatment plan    Colorectal Cancer Screening: During our visit today, we discussed that it is recommended you receive colorectal cancer screening. Please call or make an appointment with your primary care provider to discuss this. You may also call the [facility] scheduling line ([phone]) to set up a colonoscopy appointment.

## 2019-10-03 NOTE — PROGRESS NOTES
Care Coordinator Note.   and sister where present for the conversation  Plan is for Carboplatin every 3 weeks then repeat scan.  Patient has had Carboplatin in the past and not had any problems with it.   Will start after her brain radiation has been completed.   Answered all questions will have labs after the MRI this afternoon.  Discussed increasing the Miralax to twice aday until her bowels start moving. Will also take Senna andres she does her narcartics.         Patient verbalized back understanding of the above information discussed.   Face to face time spent with patient 10.  Tammy CISNEROS RN, OCN  Care Coordinator   Gynecologic Cancer   Office [phone]        Care Coordinator Note 5/15/19  530 PM  Labs were reviewed by Dr Yeung and she is requesting NS IV fluid tonight and NA rechecked Called infusions center at [facility] and they were unable to do this afternoon or early tomorrow morning.  Dr Yeung was informed of this  She wanted to have her go in to the Er to have the fluid.   PT informed and she makayla go to [facility] for fluid.  [facility] was notified.    Patient verbalized back understanding of the above information discussed.   Tammy CISNEROS RN, OCN  Care Coordinator   Gynecologic Cancer   Office [phone]

## 2019-10-03 NOTE — ED AVS SNAPSHOT
[facility] Emergency Department  [address]  [facility] [address]  Phone:  [phone]  Fax:  [phone]                                    Odalys Nathan   MRN: [id number]    Department:  [facility] Emergency Department   Date of Visit:  10/3/2019           After Visit Summary Signature Page    I have received my discharge instructions, and my questions have been answered. I have discussed any challenges I see with this plan with the nurse or doctor.    ..........................................................................................................................................  Patient/Patient Representative Signature      ..........................................................................................................................................  Patient Representative Print Name and Relationship to Patient    ..................................................               ................................................  Date                                   Time    ..........................................................................................................................................  Reviewed by Signature/Title    ...................................................              ..............................................  Date                                               Time          22EPIC Rev 08/18

## 2019-10-03 NOTE — PROGRESS NOTES
Follow Up Notes on Referred Patient    Date: October 3, 2019            RE: Odalys Nathan  : 1960  HOLA: October 3, 2019    Odalys Nathan is a 58 year old woman with a diagnosis of recurrent stage IIIC bilateral ovarian cancer. She is here today for follow up and disease management following neurosurgery.     Oncology History:  2012 - Admitted to hospital for 2 weeks of intermittent abdominal cramping, distention, diarrhea and N/V. CT of abdomen/pelvis significant for small bowel obstruction, a heterogenous soft tissue density in the pelvis, omental nodules, and ascites. Bilateral adnexal masses per U/S of pelvis. CA-125 was elevated at 987, CEA was normal at 1.0.    12 - Therapeutic paracentesis (4 L) with cytology confirming malignancy (AK positive, weak ER positive, CK-7 positive consistent with GYN primary). Surgery recommended d/t potential for falling blood counts 2/ chemotherapy (patient is Mandaen and limiting blood transfusion)    12 - Exploratory laparotomy, MAR BSO, lysis of adhesion, Appendectomy, Repair cystotomy, Omentectomy Eeva-wammdk-vhqltozfd-transverse-colon resection, Ileo-descending colon anastomosis, CUSA, & Colonoscopy (done by Dr. Amato and colorectal team).  2012 - Admitted to hospital for bilateral pulmonary emboli and drainage of pleural effusion. Started on Lovenox.  12-3/21/12: Cycle #1-2 Carbo/Taxol IV  3/29/12 - Started on Keflex by her PCP for infection in her healing wound (immediately below her umbilicus).    12-12: Cycle #3-6 IV chemo, Cycle #1 IV/IP chemo  12 -  8, CT PRADEEP - enrolled in  - observation arm  3/4/13-13:  6, 9, 12, 15, 20.  13: CT Chest, Abdomen, Pelvis IMPRESSION:  1. Worsening metastatic ovarian carcinoma suggested by increased size of soft tissue nodules anterior to the right psoas muscle, that may represent growing mesenteric lymphadenopathy.  2.  Remaining prominent right lower quadrant mesenteric lymph nodes are not significantly changed from CT 7/16/2012.  3. Clustered nodular opacities in the right lower lobe are not significant change from 7/16/2012 and remain indeterminate. Again, the appearance and distribution is suggestive of an infectious etiology.  4. Stable 8 mm soft tissue nodule in left breast, unchanged since at least 02/20/2012. This can be observed on followup studies, but correlation with mammography could be considered.  Decision made to start Doxil/Carbo.  7/11/13: The left ventricular ejection fraction is normal at 66.4%.  7/12/13-9/6/13: Cycle #1-3 Doxil/Carbo.  10, 11.    9/30/13 CT C/A/P Impression:  1. Overall, favorable response to treatment with decreasing size of soft tissue nodules tracking along the anterior aspect of the right psoas muscle.  2. 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  3. Improved cluster of right lower lobe pulmonary nodular opacities. These may represent resolving infection.  10/3/13-12/9/13:  9, 8, 10. Cycle 4-6 Doxil/Carbo.    1/13/14 CT C/A/P Impression:   1. Stable appearance of metastatic ovarian cancer. Scattered soft tissue nodules along the anterior aspect of the right psoas muscle are unchanged in size. Mild mesenteric lymphadenopathy is unchanged.  2. Clustered micronodules in the right lower lobe are unchanged from 9/30/2013, but improved from 7/1/2013. This history suggests a postinflammatory/postinfectious etiology.  3. 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  1/16/14 Discussed multiple options for her based on relatively stable-appearing disease on CT but slight increase in  (which has had small increase to 20 with last recurrence) including chemo break with recheck of  in 1 month, starting new chemo agent immediately, and exploratory surgery with possible resection of nodules. She is considered platinum-sensitive based on > 1 year remission after  Taxol/Carbo, which we will take into consideration for future chemo planning. I am not inclined to surgery at this time given difficulty she already has with diarrhea secondary to past colon resection. I suspect we would need to resect further bowel due to mesenteric disease. Also explained inherent risks of any major surgery. Also mentioned maintenance chemo, but this has not been shown to increase overall survival and would likely decrease her quality of life without significant benefit. Family is going on vacation to [place] in 2 weeks and Odalys does not want to have chemo prior to that, so will plan to take 1 month break. She can have  at that time (discussed checking toady since last draw was in early December, but as it would likely not change treatment plan and she has h/o slow rising , will not check today).  2/17/14  16    2/20/14: Decision to take break from chemo for two months, followed by CT and CA-125.    4/21/14  27  4/21/14 CT C/A/P Impression:    1. Increased size of 2 low-attenuation lymph nodes anterior to the right psoas muscle is concerning for worsening metastatic ovarian cancer.    2. New circumferential thickening of a 3.8 cm length segment of distal transverse colon is likely physiologic. Recommend attention on followup imaging.    3. Grossly unchanged size of clustered small nodules versus scarring in the right lower lobe the lungs.    4. Stable thrombosis of the right ovarian vein.  5/14/14: Diagnostic laparoscopy converted to exploratory laparotomy and removal of mesenteric masses, tumor debulking, peritoneal biopsies and intraperitoneal port placement. On laparoscopy, it was noted that there were small nodules on the anterior abdominal wall near the previous incision, small nodules on the right pelvic sidewall as well. Nodules were palpated in the mesentery; however, as it was unable to clarify where the origin of the nodules was, the decision was made to open  "the patient. On opening there was found to be approximately a 3 cm nodule in the small bowel mesentery and another separate approximately 2 cm nodule in the bowel mesentery. Pelvis without evidence of cancer, some mesenteric lymph nodes were palpated. No evidence otherwise of any disseminated cancer throughout the abdomen.    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 (2/2)  -Largest metastasis: 1.3 cm  -See comment  D: Peritoneum, right paracolic gutter #2, biopsy:  -Fibroadipose tissue with granulomatous inflammation surrounding refractile material  -Negative for malignancy   E: Small bowel adhesion, biopsy:  -Fibroconnective tissue, consistent with adhesion  -Negative for malignancy  F: Lymph nodes, mesentry, not otherwise specified, excision:  -Two lymph nodes, negative for metastatic carcinoma (0/2)  G: Lymph node, mesentery, \"#2\", excision:  -One lymph node, negative for metastatic carcinoma (0/1)  H: Lymph nodes, mesentery, \"nodule #2\", excision:  -Five lymph nodes, negative for metastatic carcinoma (0/5)  COMMENT:  Some of the specimens show post-operative changes. Others show possible treatment related changes, including necrosis. The metastatic carcinoma in the mesenteric lymph nodes (specimen C) shows variable morphology, including relatively low grade tumor with papillary architecture, and high grade tumor comprised of nests of tumor cells with irregular, slit-like spaces and marked nuclear pleomorphism.    5/29/14: Cycle 1 IV PACLitaxel / IP CISplatin / IP PACLitaxel.  - 28.  6/26/14: Cycle #2 IV/IP.  10  8/5/14: CT chest/abd/pelvis IMPRESSION "     1. In this patient with ovarian cancer, overall findings are indicative of stable/slight improvement, as multiple mesenteric lymphadenopathy and scattered nodular peritoneal soft tissue mass lesions appear unchanged or slightly smaller since 4/21/2014.    2. 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 Mild dilatation of the second and the third portion of the duodenum with a narrow SMA angle. This could represent SMA syndrome, if clinically correlated.17/14:    Cycle #3 IV/IP.  10.    CT chest/abd/pelvis with contrast on 8/5/14    Impression:    1. In this patient with ovarian cancer, overall findings are indicative of stable/slight improvement, as multiple mesenteric lymphadenopathy and scattered nodular peritoneal soft tissue mass lesions appear unchanged or slightly smaller since 4/21/2014.    2. 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 Mild dilatation of the second and the third portion of the duodenum with a narrow SMA angle. This could represent SMA syndrome, if clinically correlated.  8/7/14: Cycle #4 Taxol/Carbo (changed from IV/IP).  10. She has been feeling okay. She is unsure if she can finish out the course of 6 cycles IV/IP taxol/cisplatin. She feels like she has the flu for about a week then starts feeling gradually better after each chemo cycle. Her spouse notes that she actually has been more sick with the treatments than she initially admits here. She was also previously having some rib pain. Denies any rib pain now. Denies any chest pain or shortness of breath.  Plan: discussed recent CT cap results and switching to just IV as she is feeling miserable with IP treatments. Switch to IV carbo/taxol as patient is platinum sensitive.  We also discussed her taking part of the tesaro trial, which would require BRCA testing. She would like to take part in this trial if eligible.  8/20/14: Remove Intraperitoneal Port ( Port and catheter intact -  discarded)  8/28/14: Cycle #5 Taxol/Carbo held due to thrombocytopenia.  6.    She denies any vaginal bleeding, no changes in her bowel or bladder habits, no nausea/emesis, no lower extremity edema, and no difficulties eating or sleeping. She denies any abdominal discomfort/bloating, no fevers or chills, and no chest pain or shortness of breath. She states her diarrhea is the same. She reports some fatigue which improves about 1-2 weeks after her chemotherapy. She states she does not need any medication refills and she was told she does not meet the criteria for the TESARO trial. She states she has 3 bags of iv fluids left over from her previous chemotherapy and will give these to herself. She states she is ready for her treatment today.    9/29/14: Cycle #6 Taxol/Carbo  6. Insurance questions regarding GSF coverage today. No concerns other than fatigue. Taking iron for anemia and does not desire blood transfusion. Using neulasta for neutropenia. Using home IV hydration if needed. Baseline unchanged. No abdominal bloating, constipation, diarrhea, pain, vaginal or rectal bleeding, cough or dyspnea, fluid retention.    10/16/14: Impression:    1. Nodular peritoneal soft tissue mass in the right lower quadrant adjacent to the psoas muscle is no longer appreciated. Adjacent prominent lymphadenopathy is unchanged from previous exam. No new peritoneal lesions.    2. 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     10/20/14:  5. CT chest/abdomen/pelvis on 10/16/14 showed nodular peritoneal soft tissue mass in the right lower quadrant adjacent to the psoas muscle is no longer appreciated. Adjacent prominent lymphadenopathy is unchanged from previous exam. 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  1/27/15:  6.  4/28/15:  14.  5/26/15:  18.  6/2/15: CT cap Impression:  1. Postsurgical changes of hysterectomy and bilateral  salpingo-oophorectomy for ovarian cancer. There is a new 8 mm hazy, ill-defined hypoattenuating lesion in hepatic segment 6 which is suspicious for a metastatic deposit. Further evaluation with ultrasound in recommended.    2. Increased size of a left retroperitoneal lymph node which is indeterminate but may represent a ciro metastasis. Mildly prominent lymph nodes in the right lower quadrant are not significantly changed.  3. Moderate colonic stool burden.    6/4/15: US abdomen IMPRESSION:    Hyperechoic lesion in the right hepatic lobe, consistent with hemangioma. This does not corresponds to the area of the lesion seen on CT from 6/2/2015. An MR would be helpful for identifying and characterizing the lesion from the recent CT.  6/12/15: MR abd IMPRESSION:  1. New 20 x 11 mm enhancing lesions between the right obliques, concerning for metastatic disease. This lesions should be amenable to percutaneous biopsy, if indicated.  2. Correlating to the lesion visualized on comparison CT is a hepatic segment 6 subcapsular 7 mm lesion. Overall the appearance favors the diagnosis of a simple cyst. However, there is faint suggestion of mild peripheral arterial enhancement. Although this is favored as  artifactual, this should be followed up to confirm stability. Recommend 6 month followup.  3. Hepatic segment 6, 5 mm lesion too small to technically characterize. Differential would favor FNH, less likely flash filling hemangioma. Recommend attention on followup.  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      9/1/15: Cycle #1 Avastin/Cytoxan.   31.      9/24/15:feeling generally well. She says she has been having back and stomach spasms. She is taking cytosine daily (she ran out yesterday). She also says its affecting her voice. Admits that it burns occasionally. She is eating and  drinking normal. She also admit diarrhea, 5-7 times daily, lose/watery. She trying to stay hydrated and eat fiber. She also says that her body is sore, especially the bottom of her feet. Her blood pressure is normal. She also admits having headache after her first infusion.       9/24/15: Cycle #2 Avastin/Cytoxan.  19.  10/15/15: Cycle #3 Avastin/Cytoxan.  16.      11/6/15: CT c/a/p IMPRESSION:    1. Stable postoperative change of MAR/BSO for ovarian cancer.  2. The lesion in the right flank abdominal musculature is slightly decreased in size. Otherwise, stable examination.  2. No evidence of metastatic disease in the chest.     11/20/15: Treatment planning visit,  16     11/25/15: surgical pathology report  FINAL DIAGNOSIS:  Soft tissue, right oblique muscle mass, excision:  -Recurrent ovarian serous carcinoma  -Carcinoma is present less than 1 mm from one resection margin  -Background skeletal muscle and fibroadipose tissue     1/4/16:  22  1/11/16-1/27/16: Radiation to right flank x 12 treatments  4/7/2016:  94. CT cap IMPRESSION:    In this patient with ovarian cancer status post MAR/BSO and descending/transverse colectomy:  1. No evidence for malignancy in the chest, abdomen, or pelvis.  2. Stable small hypodense segment 6 liver lesion, appears more likely benign, possibly a cyst.  5/13/16:  124.  6/3/16: PET CT IMPRESSION: In this patient with a history of ovarian cancer:  1. Hypermetabolic and enlarging periaortic and perihepatic lymphadenopathy compatible with metastatic disease, as detailed above.  2. Although hypodense lesion in hepatic segment 6 has been present since 6/2/2015 associated hypermetabolism makes this lesion highly concerning for metastatic disease.     Plan: to start Niraparib under TESARO study.      6/9/16:  137.  6/14/16: Cycle #1 Niraparib.    6/28/16: Cycle #1 D15 Niraparib.    7/5/16:  100.    7/11/16: Cycle #2 Nraparib.    83.     8/3/2016: PET CT IMPRESSION:    In this patient with known history of ovarian cancer:  1) New pleural based nodular opacities in the lateral and inferior aspects of the bilateral lower lobes, worse in the left lung. Likely infection. Close follow up is recommended.      2) Slight decrease in hypermetabolic abdominal lymphadenopathy. 2 hypermetabolic lymph nodes persist.  3) Unchanged right hepatic lobe metastatic lesion.      8/9/16: Cycle #3 Niraparib.  69.  9/6/16: Cycle #4 Niraparib.  53. Dose held due to anemia.  9/13/16: Eval for potential cycle 4 niraparib. Dose held due to anemia.  10/4/16: CT CAP impression:  IMPRESSION: In this patient with a known history of ovarian cancer:  1. There has been interval resolution of pleural-based nodular  opacities which likely represented infection.  2. Abdominal lymphadenopathy in the form of 2 portacaval lymph nodes  have not significantly changed in size, noted to be hypermetabolic on  prior PET/CT.  3. Previously demonstrated metastatic lesion in the right lobe of the  liver is not significantly changed.  10/11/16:  60  11/1/16: C6 niraparib,  75  11/29/16: C7 niraparib.  78. CT CAP impression as follows:  Target lesions (RECIST criteria):       A previously described target lesion superior to the head of the  pancreas (series 2, image 64)  (referred to as a perihepatic node on  6/3/2016) may not be a valid target lesion because it measured less  than 1.5 cm originally. However, this particular node has decreased in  size, now measuring 7 mm in short axis versus 14 mm on 6/3/2016 when  measured in a similar fashion.       2.3 cm short axis portacaval lymph node on series 2 image 67,  previously 2.0 cm on 10/4/2016.       1.2 cm subtle hypodensity in hepatic segment 6 on series 2 image 75,  stable on multiple studies since at least 6/3/2016     Sum of diameters today: 3.5 cm. Sum of diameters 10/4/2016: 3.2 cm.  Growth = 9%.     12/27/16: C8 niraparib.  105.  1/25/17: C9 niraparib.  108.  2/23/17: C10 niraparib.  132.   CT CAP impression:  Sum of target lesion diameters today: 3.7 cm. Sum of target lesion  diameters on 11/28/2016: 3.5 cm. Growth= 6%  1. In this patient with history of ovarian cancer there is stable  disease by RECIST criteria as evidenced by:   1a. Mildly increased size of liver metastasis.  1b. Stable portacaval lymphadenopathy.  1c. No evidence of metastatic disease in the chest.  2. Trace emphysematous changes of the lungs.  3/22/17: C11 niraparib.  132.  4/19/17: C12 niraparib.  127.  5/16/17: CT CAP IMPRESSION: In this patient with ovarian cancer:  1. Mildly increased size of hepatic metastasis segment 6 with subtle increased capsular retraction.  2. Stable edmundo hepatis nodes, with mild increase in size of periaortic lymph nodes.  3. Prominent left supraclavicular lymph node with subtle increase in size compared to prior studies, particularly comparing to 10/4/2016.  4. Mild subtle groundglass opacities in the right upper lobe, not present on prior study, lesser extent in the right lower lobe.  Findings may represent infection, additional consideration is malignancy (less likely), and attention on follow-up study  Recommended.  Addendum:   Prominent left supraclavicular lymph node (3/25) is stable from most recent CT performed 2/20/2017, currently measuring 14 x 16 mm, previously 14 x 16 mm on 2/20/2017.      The portal caval lymph node/edmundo hepatis lymph node is stable from 2/20/2017, measuring 21 mm.      Clarification of size of the para-aortic lymph node (series 3 image 327). It short axis measurement is 11 mm versus 9 mm on prior study.      Hepatic segment 6 triangular-shaped low density lesion (3/362) measures 14 mm, previously measured 12 mm, demonstrating a  possible/questionable minimal subtle increase in size. Similarly the lymph nodes noted above in the supraclavicular and  para-aortic regions demonstrate possible minimal possible subtle increase in size.      5/18/17: Cycle #13 Niraparib.  191.  6/15/17: Cycle #14 Niraparib.  146.  7/13/17: Cycle #15 Niraparib 200 mg.  171     CT (8/9/17):       IMPRESSION:  1. Segment 6 hepatic metastasis is stable to minimally increased in size.  2. Slight increase in multicentric adenopathy, most pronounced at the edmundo hepatis. Additional sites include the inferior left neck/supraclavicular region and retroperitoneum which appear stable to  minimally increased.      8/10/17:  175  9/14/17: MUGA LVEF 54%  9/22/17: C1D1 carboplatin/Doxil.  187.  10/19/17: C2D1 carboplatin/Doxil.  108.  11/17/17: C3D1 carboplatin/Doxil.  82.  12/14/17: C4D1 carboplatin/Doxil/avastin.  83.  Of note, avastin held on C4D14  1/12/18: C5D1 carboplatin/Doxil/avastin.  80.  1/26/18: platelets 61, patient was not given avastin due to being jehova's witness.   2/9/18: C6D1 carboplatin/Doxil/Avastin.   71.  3/2/18:  49. Three month treatment break.    6/20/2018:  170. CT CAP:  IMPRESSION: In this patient with history of ovarian cancer:  1. Increased size of a right hepatic lobe lesion and numerous edmundo hepatis and retroperitoneal lymph nodes compatible with progression of metastatic disease.  2. New mild intrahepatic biliary ductal dilatation, periportal edema, and pericholecystic fluid. Increased soft tissue fullness in the edmundo hepatis in the expected location of the common hepatic duct. Findings  are suspicious for developing biliary ductal obstruction secondary to worsening metastatic disease in the edmundo hepatis. Correlation with liver function tests is recommended. Right upper quadrant ultrasound  may be beneficial.  3. Unchanged left supraclavicular and right hilar lymphadenopathy in the chest.      8/3/18: C1D1 weekly paclitaxel.  not done.  9/20/18: C2D1 weekly paclitaxel 80mg/m2. Ca  125-56  11/8/2018: C3D1 weekly paclitaxel;  26     12/17/18: Ct cap IMPRESSION:  1. New area of lobulated hypodense nodularity at the far-inferior right liver. This raises the possibility of a new area of hepatic metastasis.  2. Conversely, other nodules within the right liver are smaller suggesting improvement.  3. Improved adenopathy at the abdominal retroperitoneum. Improved left  supraclavicular adenopathy. Stable mildly prominent right hilar lymph nodes.  4. Previously noted ill-defined soft tissue at the edmundo hepatis region is still present but appears less prominent in size.  5. New finding of segmental wall thickening of the proximal sigmoid colon with some fluid distention of the adjacent colon. This could represent a segmental colitis. Other etiologies not excluded.    12/20/18:   19.  1/22/19:  45.  3/20/19: CT cap IMPRESSION:  1. Progression of disease with increasing size of a right inferior hepatic mass consistent with metastatic disease.  2. Increasing ill-defined multifocal regions of soft tissue at the edmundo hepatis consistent with malignant adenopathy versus malignant implants. Associated increased intrahepatic biliary ductal dilatation.  3. Other areas of progressive adenopathy noted at the left neck base, mediastinum, and abdominal retroperitoneum.  4. New area of carcinomatosis medial to stomach at the left upper abdomen.    3/20/19: Cycle #1 Weekly Paclitaxel.   180.  5/7/19: Cycle #2 Weekly Paclitaxel.    142.  6/20/19: Cycle #3, Day #1Weekly Paclitaxel/  pending  6/27/2019: C3, D8  7/3/19: C3 D15  7/11/19: C3; D22    8/8/2019: Gemzar C1 D1  Gemzar Day8  8/29/2019: Gemzar C2 D1  9/5/19: Day 8    7/11/19: CT CAP-IMPRESSION:  1. Slight increased size of the left supraclavicular lymph node.  Slight progression of the mediastinal lymph nodes is also evident. New  periesophageal node as described above. Retroperitoneal nodes are  stable if not slightly smaller.  Some of these nodes appear to have  partially calcified suggesting a response to interval therapy.  2. Interval decreased size of the inferior right hepatic lobe lesion  now with an area of central calcification or enhancement. No new liver  lesions. Remaining abdominal organs are grossly unremarkable. No  significant hydronephrosis.  3. Postop changes involving the bowel and pelvic region. No recurrent  pelvic mass.    presented to the [facility] ED on 9/9/19 with several weeks of headache, weight loss, and a few days of confusion and malaise, found to have a new right frontal brain tumor with vasogenic edema on CT head.     9/9/2019: CT chest-                                                                 IMPRESSION:   1. No evidence of acute pulmonary embolism.  2. New small-to-moderate right and trace left pleural effusions with  associated atelectasis.  3. Progression of metastatic disease in the chest, including worsening  mediastinal, hilar, and supraclavicular adenopathy. Increased pleural  thickening at the right apex also concerning for developing pleural  carcinomatosis, possibly contributing to the pleural effusions.      [Urgent Result: Worsening metastatic disease.]    CT A/P:   IMPRESSION: In this patient with a history of ovarian cancer, findings  are concerning for progression of disease since 7/11/2019:  1. Larger mass in inferior segment 6 in the liver, and increased  infiltrative tissue in the edmundo hepatis which extends along the right  portal portal branches and bile ducts, and inferiorly to the duodenum,  which is potentially now involved by tumor as there is new focal wall  edema and hypoenhancement at this location, versus postprocedural  edema.  1a. The tumoral progression is in keeping with rising CA-125 levels.  2. Increased mass effect on the extrahepatic portal vein, with  unchanged cavernous transformation and multiple collaterals.  3. New biliary stents. Mildly increased intrahepatic  biliary ductal  dilatation. Increased pneumobilia, nonspecific given stents.  Cholangitis is not excluded given potentially worsening obstruction.  4. New moderate right hydronephrosis and hydroureter. The right ureter  appears potentially tethered to new enhancing tissue associated with  the right ovarian vein concerning for focal worsening metastatic  disease.  5. Mildly worsened retroperitoneal and mesenteric adenopathy.  6. New small volume ascites.  7. New mildly prominent loops of small bowel with thickened walls in  the lower central abdomen. No transition point to suggest obstruction.  Findings may be secondary to venous congestion given portal findings  and worsening ascites, or possibly enteritis.     9/12/19: MRI Brain-    Findings: Limited imaging for stereotactic imaging demonstrates a  peripherally enhancing mass centered in the right frontal lobe  measuring approximately 5.5 x 4.6 x 4.8 cm. No hydrocephalus. The  major intracranial arterial structures are grossly patent.    9/18/19: Stealth Assisted Right Craniotomy And Tumor Resection  SPECIMEN(S):   A: Right brain tumor   B: Right brain tumor     FINAL DIAGNOSIS:   A) Brain, right tumor, biopsy:        - Metastatic adenocarcinoma     B) Brain, right tumor, excision:        - Metastatic adenocarcinoma     9/18/2019: MRI brain-                                                                 Impression:  1. New postoperative changes of right frontal craniotomy for resection  of right frontal mass. Expected postoperative hemorrhage and  pneumocephalus in the resection site  2. The hemorrhage along the surgical margins decreases the sensitivity  of the postcontrast sequences. No definite residual tumor, although  there is the question of mild nodularity adjacent to the resection  margin anteromedially. Follow-up recommended.       To start radiation (gamma treatment) on 10/7/19 of surgical cavity  Stent placement planned tomorrow.    Today:   Taking  oxycodone 15 mg and tylenol. Taking oxycodone 15 mg q 6 hours due to abdominal pain. Does not eat when has abdominal pain. Poor po intake. Oxycodone makes her sleepy.           Date Value Ref Range Status   08/29/2019 548 (H) 0 - 30 U/mL Final     Comment:     Assay Method:  Chemiluminescence using Siemens Centaur XP   08/08/2019 446 (H) 0 - 30 U/mL Final     Comment:     Assay Method:  Chemiluminescence using Siemens Centaur XP   07/24/2019 425 (H) 0 - 30 U/mL Final     Comment:     Assay Method:  Chemiluminescence using Siemens Centaur XP   07/15/2019 340 (H) 0 - 30 U/mL Final     Comment:     Assay Method:  Chemiluminescence using Siemens Centaur XP   06/20/2019 259 (H) 0 - 30 U/mL Final     Comment:     Assay Method:  Chemiluminescence using Siemens Centaur XP   05/07/2019 142 (H) 0 - 30 U/mL Final     Comment:     Assay Method:  Chemiluminescence using Siemens Centaur XP   03/20/2019 180 (H) 0 - 30 U/mL Final     Comment:     Assay Method:  Chemiluminescence using Siemens Centaur XP   03/11/2019 147 (H) 0 - 30 U/mL Final     Comment:     Assay Method:  Chemiluminescence using Siemens Centaur XP   01/22/2019 45 (H) 0 - 30 U/mL Final     Comment:     Assay Method:  Chemiluminescence using Siemens Centaur XP   12/20/2018 19 0 - 30 U/mL Final     Comment:     Assay Method:  Chemiluminescence using Siemens Centaur XP           Review of Systems:  Answers for HPI/ROS submitted by the patient on 9/30/2019   General Symptoms: No  Skin Symptoms: No  HENT Symptoms: No  EYE SYMPTOMS: No  HEART SYMPTOMS: No  LUNG SYMPTOMS: No  INTESTINAL SYMPTOMS: Yes  URINARY SYMPTOMS: No  GYNECOLOGIC SYMPTOMS: No  BREAST SYMPTOMS: No  SKELETAL SYMPTOMS: No  BLOOD SYMPTOMS: No  NERVOUS SYSTEM SYMPTOMS: No  MENTAL HEALTH SYMPTOMS: No  Heart burn or indigestion: Yes  Nausea: Yes  Vomiting: Yes  Abdominal pain: Yes  Bloating: Yes  Constipation: Yes  Diarrhea: No  Blood in stool: No  Black stools: No  Rectal or Anal pain: No  Fecal  incontinence: No  Yellowing of skin or eyes: No  Vomit with blood: No  Change in stools: No    ROS reviewed with patient.    Past Medical History:    Past Medical History:   Diagnosis Date     Antiplatelet or antithrombotic long-term use      Ascites      Blood clot in the legs      Diabetes (H)      Ovarian cancer (H)     serous,stg IV     Pleural effusion      Pulmonary embolism (H) 2/2012     Refusal of blood transfusions as patient is [employer]      Short gut syndrome      Subclinical hypothyroidism 4/18/2013     Thrombosis of leg          Past Surgical History:    Past Surgical History:   Procedure Laterality Date     COLECTOMY       COLONOSCOPY  2/1/2012    Procedure:COLONOSCOPY; With Biopsy; Surgeon:TONI SULLIVAN; Location:[facility]     ENDOSCOPIC RETROGRADE CHOLANGIOPANCREATOGRAM N/A 8/23/2019    Procedure: Endoscopic Retrograde Cholangiopancreatogram with 4 mm Hurricane Balloon Dilation, gallbladder stent and Bile Duct stent placements, Bilarry Sphincterotomy ;  Surgeon: Catrachito Lloyd MD;  Location: [facility] OR     HYSTERECTOMY TOTAL ABD, RHIANNA SALPINGO-OOPHORECTOMY, NODE DISSECTION, TUMOR DEBULKING, COMBINED  2/1/2012    Procedure:COMBINED HYSTERECTOMY TOTAL ABDOMINAL, BILATERAL SALPINGO-OOPHORECTOMY, NODE DISSECTION, TUMOR DEBULKING;  Exploratory Laparotomy, Total Abdominal Hysterectomy, Bilateral Salpingo-Oophorectomy, appendectomy,lysis of adhesions, ileal, ascending, transverse and splenic flexure resection, ileal descending bowel renanastomosis, incidental cystotomy repair, CUSA procedure and colonoscopy ; [facility]     INSERT PORT PERITONEAL ACCESS  4/3/2012    Procedure:INSERT PORT PERITONEAL ACCESS; Intraperitoneal Port Placement (c-arm); Surgeon:SAMUEL CARRASCO; Location:[facility]     INSERT PORT PERITONEAL ACCESS  5/14/2014    Procedure: INSERT PORT PERITONEAL ACCESS;  Surgeon: Nga Yeung MD;  Location: [facility] OR     INSERT PORT VASCULAR ACCESS       IR PORT REMOVAL RIGHT  9/20/2019      LAPAROSCOPY DIAGNOSTIC (GYN)  5/14/2014    Procedure: LAPAROSCOPY DIAGNOSTIC (GYN);  Surgeon: Nga Yeung MD;  Location: [facility]     LAPAROTOMY EXPLORATORY Right 11/25/2015    Procedure: LAPAROTOMY EXPLORATORY;  Surgeon: Nga Yeung MD;  [facility]     LAPAROTOMY, TUMOR DEBULKING, COMBINED  5/14/2014    Procedure: COMBINED LAPAROTOMY, TUMOR DEBULKING;  Surgeon: Nga Yeung MD;  Location: [facility]     OPTICAL TRACKING SYSTEM CRANIOTOMY, EXCISE TUMOR, COMBINED Right 9/18/2019    Procedure: Stealth Assisted Right Craniotomy And Tumor Resection;  Surgeon: Bertin Dewey MD;  Location: [facility]     PICC INSERTION Left 09/20/2019    5Fr - 46cm (4cm external), basilic vein, low SVC     REMOVE CATHETER PERITONEAL N/A 8/20/2014    Procedure: REMOVE CATHETER PERITONEAL;  Surgeon: Nga Yeung MD;  Location: [facility]     VASCULAR SURGERY      stent left iliac vein         Health Maintenance Due   Topic Date Due     PREVENTIVE CARE VISIT  1960     HIV SCREENING  08/03/1973     HPV  08/03/1979     DTAP/TDAP/TD IMMUNIZATION (1 - Tdap) 08/03/1983     LIPID  08/03/2003     ZOSTER IMMUNIZATION (1 of 2) 08/03/2008     PNEUMOCOCCAL IMMUNIZATION 19-64 HIGHEST RISK (2 of 3 - PCV13) 10/17/2013     MAMMO SCREENING  02/01/2015     PAP  04/04/2018     PHQ-2  01/01/2019     INFLUENZA VACCINE (1) 09/01/2019       Current Medications:     Current Outpatient Medications   Medication Sig Dispense Refill     albuterol (PROAIR HFA/PROVENTIL HFA/VENTOLIN HFA) 108 (90 Base) MCG/ACT inhaler Inhale 2 puffs into the lungs every 6 hours as needed for shortness of breath / dyspnea or wheezing 1 Inhaler 3     amoxicillin-clavulanate (AUGMENTIN) 875-125 MG tablet Take 1 tablet by mouth 2 times daily 60 tablet 1     Ascorbic Acid (VITAMIN C PO) Take 500 mg by mouth daily        clotrimazole 10 MG rell Take 1 Rell (10 mg) by mouth 5 times daily 70 Rell 0     cyanocobalamin (VITAMIN B12) 1000 MCG/ML injection  Inject 1 mL (1,000 mcg) into the muscle every 30 days 1 mL 11     diphenoxylate-atropine (LOMOTIL) 2.5-0.025 MG per tablet Take 2 tablets by mouth 4 times daily as needed for diarrhea 60 tablet 0     dronabinol (MARINOL) 2.5 MG capsule Take 1 capsule (2.5 mg) by mouth 2 times daily (before meals) 60 capsule 3     Ferrous Sulfate 324 (65 Fe) MG TBEC TAKE ONE TABLET BY MOUTH THREE TIMES DAILY WITH MEALS. TAKE WITH A SMALL AMOUNT OF ORANGE JUICE. DO NOT TAKE WITH CALCIUM 90 tablet 11     fluconazole (DIFLUCAN) 150 MG tablet TAKE 1 TABLET (150 MG) BY MOUTH ONCE FOR 1 DOSE  0     HEMP OIL OR EXTRACT OR OTHER CBD CANNABINOID, NOT MEDICAL CANNABIS,        HERBALS daily        LEVOTHYROXINE SODIUM PO Take 50 mcg by mouth daily        loperamide (IMODIUM) 2 MG capsule Take 2 mg by mouth 4 times daily as needed for diarrhea       LORazepam (ATIVAN) 1 MG tablet Take 1 tablet (1 mg) by mouth every 6 hours as needed (Anxiety, Nausea/Vomiting or Sleep) 30 tablet 2     magnesium oxide (MAG-OX) 400 MG tablet Take 1 tablet (400 mg) by mouth 2 times daily 90 tablet 3     oxyCODONE (ROXICODONE) 5 MG tablet Take 1-2 tablets (5-10 mg) by mouth every 6 hours as needed for moderate to severe pain 20 tablet 0     oxyCODONE (ROXICODONE) 5 MG tablet Take 1 tablet (5 mg) by mouth every 6 hours as needed for severe pain 10 tablet 0     pantoprazole (PROTONIX) 40 MG EC tablet Take 1 tablet (40 mg) by mouth daily 8 tablet 0     potassium chloride (KLOR-CON) 20 MEQ packet Take 20 mEq by mouth daily       prochlorperazine (COMPAZINE) 10 MG tablet Take 1 tablet (10 mg) by mouth every 6 hours as needed for nausea or vomiting 30 tablet 1     VITAMIN E NATURAL PO Take 100 Units by mouth daily       Calcium Carbonate-Vitamin D (CALCIUM + D PO) Take 1 tablet by mouth daily.       dexamethasone (DECADRON) 1.5 MG tablet Take 2 tablets (3 mg) by mouth every 8 hours (Patient not taking: Reported on 10/3/2019) 8 tablet 0     dexamethasone (DECADRON) 2 MG  "tablet Take 1 tablet (2 mg) by mouth every 8 hours for 3 days 9 tablet 0     ipratropium - albuterol 0.5 mg/2.5 mg/3 mL (DUONEB) 0.5-2.5 (3) MG/3ML neb solution Take 1 vial (3 mLs) by nebulization every 6 hours as needed for wheezing or shortness of breath / dyspnea (Patient not taking: Reported on 10/3/2019) 3 Box 0     ondansetron (ZOFRAN-ODT) 4 MG ODT tab Take 1-2 tablets (4-8 mg) by mouth every 6 hours as needed for nausea or vomiting 20 tablet 0     order for DME Injection Supplies for Vitamin B12: 3cc syringes w/ 27 gauge needles, 1/2 inch length 3 each 0         Allergies:      No Known Allergies     Social History:     Social History     Tobacco Use     Smoking status: Former Smoker     Packs/day: 0.00     Years: 8.00     Pack years: 0.00     Types: Cigarettes     Last attempt to quit: 1980     Years since quittin.3     Smokeless tobacco: Never Used     Tobacco comment: started at 9 yo and quit at 16 yo   Substance Use Topics     Alcohol use: Yes     Comment: 3x/day wine or jodi       History   Drug Use No         Family History:     The patient's family history is notable for:    Family History   Problem Relation Age of Onset     Cancer Mother 69        lung, smoker     Cancer Maternal Uncle 65        brain     Colon Cancer Maternal Aunt 80        colon         Physical Exam:     /75   Pulse 104   Temp 98.3  F (36.8  C) (Oral)   Resp 14   Ht 1.651 m (5' 5\")   Wt 53.2 kg (117 lb 4.8 oz)   SpO2 98%   BMI 19.52 kg/m    Body mass index is 19.52 kg/m .    General Appearance: healthy and alert, no distress     HEENT: no thyromegaly, no palpable nodules or masses        Cardiovascular: regular rate and rhythm, no gallops, rubs or murmurs     Respiratory: lungs clear, no rales, rhonchi or wheezes, normal diaphragmatic excursion    Musculoskeletal: extremities non tender and without edema    Skin: no lesions or rashes     Neurological: normal gait, no gross " defects     Psychiatric: appropriate mood and affect                               Hematological: normal cervical, 3 x 2 cm left supraclavicular lymph node, firm     Gastrointestinal:       abdomen soft, non-tender, minimally distended, right sided fullness in abdomen, non-tender    Genitourinary: Deferred      Assessment:    Odalys Nathan is a 58 year old woman with a diagnosis of recurrent stage IIIC bilateral ovarian cancer. She is here today for follow up and disease management. Imaging suggests slight increase in size of lymph nodes.    50 minutes were spent with this patient, over 50% of that time was spent in symptom management, treatment planning and in counseling and coordination of care.      Plan:     1.)         Recurrent Stage IIIC ovarian cancer-now s/p resection of brain tumor. Metastatic disease in the abdomen.  -Could consider olaparib vs. Adding avastin to weekly taxol.   -PDL-1 is 0%  -Foundation one testing does not show targetable agent.  -Right hydronephrosis-planning JJ stent tomorrow  -11/18 biliary stent replacement planned: was on IV ceftriaxon 2 g IV until 9/25/2019 and then start Augmentin 875/125 mg po BID. Patient on that now, continue to cover during time up  To ERCP.  -MRI brain today  -Topotecan discussed weekly x 3 weeks on and one week off. Risks, benefits, and alternatives to chemotherapy discussed with the patient.  -Discussed single agent carbo followed by PARP I if she responds, some data for PARP after PARP I.  -port has been removed. Has follow up with ID, would like to discuss if port replacement is possible now. Dr. Hitchcock is seeing her.  -palliative care next week  -Miralax for constipation  -Will change her to MS Contin every 12 hours. Oxycodone 5 mg prn breakthrough pain.  -Can take reflux meds she has at home  -Chemo to be done at [facility].    2.) Genetic risk factors were assessed and she is negative for mutations in BRCA1, BRCA2, EPCAM, MLH1, MSH2, MSH6, PMS2,  PTEN, and TP53 genes.     3.) Labs and/or tests ordered include: Brain tumor to Foundation One.     4.) Health maintenance issues addressed today include annual health maintenance and non-gynecologic issues with PCP.    5.)        Continue to take K and Mg bid at home; to be replaced in infusion as needed.     Nga Yeung MD    Department of Ob/Gyn and Women's Health  Division of Gynecologic Oncology  [facility]  [phone]          CC  Patient Care Team:  Aubrie Hester MD as PCP - General  Tammy Flores RN as Continuity Care Coordinator (Oncology)  Nga Yeung MD as MD (Oncology)  Patricia Rocha APRN CNP as Nurse Practitioner (Nurse Practitioner)

## 2019-10-03 NOTE — NURSING NOTE
"Oncology Rooming Note    October 3, 2019 10:21 AM   Odalys Nathan is a 59 year old female who presents for:    Chief Complaint   Patient presents with     Oncology Clinic Visit     Return - Ovarian Ca     Initial Vitals: /75   Pulse 104   Temp 98.3  F (36.8  C) (Oral)   Resp 14   Ht 1.651 m (5' 5\")   Wt 53.2 kg (117 lb 4.8 oz)   SpO2 98%   BMI 19.52 kg/m   Estimated body mass index is 19.52 kg/m  as calculated from the following:    Height as of this encounter: 1.651 m (5' 5\").    Weight as of this encounter: 53.2 kg (117 lb 4.8 oz). Body surface area is 1.56 meters squared.  Moderate Pain (4) Comment: abdomen pain   No LMP recorded. Patient has had a hysterectomy.  Allergies reviewed: Yes  Medications reviewed: Yes    Medications: Medication refills not needed today.  Pharmacy name entered into [facility]:    [id number] - [place], [place] - [address]  [facility] [place], [place] [address] [place] - [address] [facility]    Clinical concerns: Pain in abdomen and treatment planning       Narinder Yin, EMT              "

## 2019-10-03 NOTE — PROGRESS NOTES
Pre Op Teaching Flowsheet       Pre and Post op Patient Education  Relevant Diagnosis:  Ureteral stricture  Surgical procedure:  CYSTOSCOPY, WITH RETROGRADE PYELOGRAM AND URETERAL STENT INSERTION   Teaching Topic:  Pre and post op teaching  Person Involved in teaching: Odalys Nathan    Motivation Level:  Asks Questions: Yes  Eager to Learn:  Yes  Cooperative: Yes  Receptive (willing/able to accept information):  Yes    Patient demonstrates understanding of the following:  Date of surgery:  10/4/19  Location of surgery:  [facility]- 5th Floor  History and Physical and any other testing necessary prior to surgery: Yes  Required time line for completion of History and Physical and any pre-op testing: Yes    Patient demonstrates understanding of the following:  Pre-op bowel prep:  None  Pre-op showering/scrub information with PCMX Soap: Yes  Blood thinner medications discussed and when to stop (if applicable):  Yes      Infection Prevention:   Patient demonstrates understanding of the following:  Surgical procedure site care taught: at time of discharge  Signs and symptoms of infection taught:  Yes      Post-op follow-up:  Discussed how to contact the hospital, nurse, and clinic scheduling staff if necessary.    Instructional materials used/given/mailed:  [facility] Booklet, post op teaching sheet, Map, Soap, and arrival/location information.    Surgical instructions packet mailed to patient.

## 2019-10-04 NOTE — ANESTHESIA CARE TRANSFER NOTE
Patient: Odalys Nathan    Procedure(s):  CYSTOSCOPY, WITH RETROGRADE PYELOGRAM AND URETERAL STENT INSERTION    Diagnosis: Stricture or kinking of ureter [N13.5]  Diagnosis Additional Information: No value filed.    Anesthesia Type:   MAC     Note:  Airway :Room Air  Patient transferred to:Phase II  Handoff Report: Identifed the Patient, Identified the Reponsible Provider, Reviewed the pertinent medical history, Discussed the surgical course, Reviewed Intra-OP anesthesia mangement and issues during anesthesia, Set expectations for post-procedure period and Allowed opportunity for questions and acknowledgement of understanding      Vitals: (Last set prior to Anesthesia Care Transfer)    CRNA VITALS  10/4/2019 0932 - 10/4/2019 1006      10/4/2019             Resp Rate (set):  10                Electronically Signed By: HAILE Zabala CRNA  October 4, 2019  10:06 AM

## 2019-10-04 NOTE — OP NOTE
OPERATIVE REPORT  DATE: 10/04/19    PREOPERATIVE DIAGNOSIS: Right hydronephrosis 2/2 extrinsic compression/malignancy     POSTOPERATIVE DIAGNOSIS:  Same    PROCEDURES PERFORMED:   1. Cystourethroscopy with right retrograde pyelography  2. Placement of right ureteral stent.  3. Intraoperative interpretation of fluoroscopic imaging.     STAFF SURGEON: Wolf Gan MD    RESIDENT: Armando Daigle MD    ANESTHESIA: Monitor Anesthesia Care    ESTIMATED BLOOD LOSS: 0 mL.     DRAINS:  6 Fr x 26 cm double J stent on right    SIGNIFICANT FINDINGS:  An area of narrowing at right mid ureter presumably from extrinsic compression. Tortuosity of the proximal right ureter and moderate hydronephrosis, no filling defects. Successful placement of right ureteral stent with good curls noted in right renal pelvis and bladder.    OPERATIVE INDICATIONS:   Odalys Nathan is a 59 year old female [employer], with progressive metastatic ovarian cancer on palliative chemotherapy who was admitted to hospital from 9/9/19 -9/14 for nausea, anorexia, abdominal pain and found to have positive blood cultures (Klebsiella pneumonia), biliary obstruction (possible cholangitis) as well as CT evidence of new right hydronephrosis secondary to malignant obstruction. She initially elected for conservative management with observation due to the fact she was asymptomatic with normal renal function and no signs of UTI. However, after renal ultrasound showed persistent signs of obstruction and being seen in follow up in clinic with right flank pain we discussed stent placement. The patient was counseled on the alternatives, risks, and benefits and elected to proceed with the above stated procedure.    DESCRIPTION OF PROCEDURE:    After informed consent was obtained, the patient was taken to the operating room, and moved to the operating table.  After adequate anesthesia was induced, the patient was repositioned in dorsal lithotomy position  and prepped and draped in the usual sterile fashion. A timeout was taken to confirm correct patient, procedure and laterality.     A 22-Persian cystoscope was inserted into a well lubricated urethra. The urethra was unremarkable.  The bladder was free of tumors, stones or diverticuli.  The media was clear.  Bilateral ureteral orifices were orthotopic.  A Sensor guidewire was advanced into the right renal pelvis with the aid of a 5-Persian open ended ureteral catheter.  A retrograde pyelogram demonstrated an area of narrowing at mid-ureter presumably a site of extrinsic compression from know malignancy. No other stricture but did show tortuosity of the proximal right ureter and moderate hydronephrosis, no filling defects.  The wire was replaced and a 6 Fr x 26 cm double J stent was advanced over the guidewire under fluoroscopic guidance with a good curl in the renal pelvis and bladder.  The stent passed up easily with no appreciable resistance.  The bladder was then drained.    The patient tolerated the procedure well.  There were no complications.  She was awoken from anesthesia and transferred to PACU in stable condition.    PLAN:   - Transfer to PACU  - Discharge home once PACU criteria are met  - Follow up with Dr. Gan in 3-6 months from repeat stent exchange    Armando Daigle,  Urology, PGY-2

## 2019-10-04 NOTE — DISCHARGE INSTRUCTIONS
[facility]  Home Care Following Anesthesia  For 24 hours after surgery:  1. Get plenty of rest.  A responsible adult must stay with you for at least 24 hours after you leave the surgery center.  2. Do not drive or use heavy equipment.  If you have weakness or tingling, don't drive or use heavy equipment until this feeling goes away.   3. Do not drink alcohol.   4. Avoid strenuous or risky activities.  Ask for help when climbing stairs.  5. You may feel lightheaded.  IF so, sit for a few minutes before standing.  Have someone help you get up.   6. If you have nausea (feel sick to your stomach): Drink only clear liquids such as apple juice, ginger ale, broth or 7-Up.  Rest may also help.  Be sure to drink enough fluids.  Move to a regular diet as you feel able.   7. You may have a slight fever.  Call the doctor if your fever is over 100 F (37.7 C) (taken under the tongue) or lasts longer than 24 hours.  8. You may have a dry mouth, a sore throat, muscle aches or trouble sleeping. These should go away after 24 hours.  9. Do not make important or legal decisions.               Tips for taking pain medications  To get the best pain relief possible, remember these points:    Take pain medications as directed, before pain becomes severe.    Pain medication can upset your stomach: taking it with food may help.    Constipation is a common side effect of pain medication. Drink plenty of  fluids.    Eat foods high in fiber. Take a stool softener if recommended by your doctor or pharmacist.    Do not drink alcohol, drive or operate machinery while taking pain medications.    Ask about other ways to control pain, such as with heat, ice or relaxation.    Tylenol/Acetaminophen Consumption:  975 mg given at 9:00am, please wait until 3pm for another dose, then follow bottle instructions.    To help encourage the safe use of acetaminophen, the makers of TYLENOL  have lowered the maximum daily  dose for single-ingredient Extra Strength TYLENOL  (acetaminophen) products sold in the [place] from 8 pills per day (4,000 mg) to 6 pills per day (3,000 mg). The dosing interval has also changed from 2 pills every 4-6 hours to 2 pills every 6 hours.    If you feel your pain relief is insufficient, you may take Tylenol/Acetaminophen in addition to your narcotic pain medication.     Be careful not to exceed 3,000 mg of Tylenol/Acetaminophen in a 24 hour period from all sources.    If you are taking extra strength Tylenol/acetaminophen (500 mg), the maximum dose is 6 tablets in 24 hours.    If you are taking regular strength acetaminophen (325 mg), the maximum dose is 9 tablets in 24 hours.    Call a doctor for any of the followin. Signs of infection (fever, growing tenderness at the surgery site, a large amount of drainage or bleeding, severe pain, foul-smelling drainage, redness, swelling).  2. It has been over 8 to 10 hours since surgery and you are still not able to urinate (pass water).  3. Headache for over 24 hours.    Your doctor is:  Dr. Wolf Gan, Prostate and Urology: [phone]                  Or dial [phone] and ask for the resident on call for:  Prostate Urology  For emergency care, call the:  [facility] [facility]:  [phone] (TTY for hearing impaired: [phone])        Discharge Instructions Following a Cystoscopy, Stent and/or Ureteroscopy    Drainage/Urination:    Urine may be slightly bloody but should taper off in a few days.    You may have some frequency and urgency for a few days.    Comfort:    A burning sensation when urinating is common. Drinking extra fluids helps decrease this burning feeling and also helps to clear the bloody urine.    Mild abdominal cramps may occur for a few days.    Baths:    Daily tub baths aide in passing urine.    Frequent use of bubble bath is discouraged since it encourages urinary tract infections.    Report to your doctor at  once if you experience:    Inability to pass your urine    Continuous or heavy bleeding    Severe Pain    Elevated temperature > than 101.5 for 24 hours    Home Activity:    On the day of surgery spend a quiet evening at home.    Increase activity as tolerated.

## 2019-10-04 NOTE — ANESTHESIA PREPROCEDURE EVALUATION
Anesthesia Pre-Procedure Evaluation    Patient: Odalys Nathan   MRN:     [id number] Gender:   female   Age:    59 year old :      1960        Preoperative Diagnosis: Stricture or kinking of ureter [N13.5]   Procedure(s):  CYSTOSCOPY, WITH RETROGRADE PYELOGRAM AND URETERAL STENT INSERTION     Past Medical History:   Diagnosis Date     Antiplatelet or antithrombotic long-term use      Ascites      Blood clot in the legs      Diabetes (H)      Ovarian cancer (H)     serous,stg IV     Pleural effusion      Pulmonary embolism (H) 2012     Refusal of blood transfusions as patient is Nondenominational      Short gut syndrome      Subclinical hypothyroidism 2013     Thrombosis of leg       Past Surgical History:   Procedure Laterality Date     COLECTOMY       COLONOSCOPY  2012    Procedure:COLONOSCOPY; With Biopsy; Surgeon:TONI SULLIVAN; Location:[facility] OR     ENDOSCOPIC RETROGRADE CHOLANGIOPANCREATOGRAM N/A 2019    Procedure: Endoscopic Retrograde Cholangiopancreatogram with 4 mm Hurricane Balloon Dilation, gallbladder stent and Bile Duct stent placements, Bilarry Sphincterotomy ;  Surgeon: Catrachito Lloyd MD;  Location: [facility] OR     HYSTERECTOMY TOTAL ABD, RHIANNA SALPINGO-OOPHORECTOMY, NODE DISSECTION, TUMOR DEBULKING, COMBINED  2012    Procedure:COMBINED HYSTERECTOMY TOTAL ABDOMINAL, BILATERAL SALPINGO-OOPHORECTOMY, NODE DISSECTION, TUMOR DEBULKING;  Exploratory Laparotomy, Total Abdominal Hysterectomy, Bilateral Salpingo-Oophorectomy, appendectomy,lysis of adhesions, ileal, ascending, transverse and splenic flexure resection, ileal descending bowel renanastomosis, incidental cystotomy repair, CUSA procedure and colonoscopy ; Curtis     INSERT PORT PERITONEAL ACCESS  4/3/2012    Procedure:INSERT PORT PERITONEAL ACCESS; Intraperitoneal Port Placement (c-arm); Surgeon:SAMUEL CARRASCO; Location:[facility] OR     INSERT PORT PERITONEAL ACCESS  2014    Procedure: INSERT PORT PERITONEAL ACCESS;   Surgeon: Nga Yeung MD;  Location: [facility] OR     INSERT PORT VASCULAR ACCESS       IR PORT REMOVAL RIGHT  9/20/2019     LAPAROSCOPY DIAGNOSTIC (GYN)  5/14/2014    Procedure: LAPAROSCOPY DIAGNOSTIC (GYN);  Surgeon: Nga Yeung MD;  Location: [facility] OR     LAPAROTOMY EXPLORATORY Right 11/25/2015    Procedure: LAPAROTOMY EXPLORATORY;  Surgeon: Nga Yeung MD;  Location: [facility]     LAPAROTOMY, TUMOR DEBULKING, COMBINED  5/14/2014    Procedure: COMBINED LAPAROTOMY, TUMOR DEBULKING;  Surgeon: Nga Yeung MD;  Location: [facility]     OPTICAL TRACKING SYSTEM CRANIOTOMY, EXCISE TUMOR, COMBINED Right 9/18/2019    Procedure: Stealth Assisted Right Craniotomy And Tumor Resection;  Surgeon: Bertin Dewey MD;  Location: [facility] OR     PICC INSERTION Left 09/20/2019    5Fr - 46cm (4cm external), basilic vein, low SVC     REMOVE CATHETER PERITONEAL N/A 8/20/2014    Procedure: REMOVE CATHETER PERITONEAL;  Surgeon: Nga Yeung MD;  Location: [facility] OR     VASCULAR SURGERY      stent left iliac vein          Anesthesia Evaluation     . Pt has had prior anesthetic. Type: General and MAC    No history of anesthetic complications          ROS/MED HX    ENT/Pulmonary:     (+)tobacco use, Past use , . .    Neurologic:  - neg neurologic ROS     Cardiovascular:     (+) ----. : . . . :. . Previous cardiac testing Echodate:9/14/17 MUGAresults:1. Left ventricular ejection fraction is 54%. On the prior study from  7/11/2013 this was 66.4%.  2. Normal appearing left ventricular wall motion.date: results: date: results: date: results:          METS/Exercise Tolerance:     Hematologic: Comments: Jehova's Witness    (+) History of blood clots (2012) pt is not anticoagulated, Anemia, Other Hematologic Disorder-chemo induced neutropenia      Musculoskeletal:         GI/Hepatic: Comment: Short gut syndrome    (+) cholecystitis/cholelithiasis,       Renal/Genitourinary:         Endo:     (+) type II DM thyroid problem  (subclinical ) hypothyroidism, .      Psychiatric:  - neg psychiatric ROS       Infectious Disease:         Malignancy:   (+) Malignancy History of Other  Other CA ovarian CA w/ mets-->liver, brain Active status post Surgery and Chemo         Other: Comment: Malnutrition   Jahova's witness   (+) H/O Chronic Pain,H/O chronic opiod use ,                        PHYSICAL EXAM:   Mental Status/Neuro: A/A/O   Airway: Facies: Feasible  Mallampati: II  Mouth/Opening: Full  TM distance: > 6 cm  Neck ROM: Full   Respiratory:   Resp. Rate: Normal     Resp. Effort: Normal      CV:    Comments:      Dental: Normal Dentition                LABS:  CBC:   Lab Results   Component Value Date    WBC 10.3 10/03/2019    WBC 12.7 (H) 10/03/2019    HGB 11.2 (L) 10/03/2019    HGB 11.5 (L) 10/03/2019    HCT 34.7 (L) 10/03/2019    HCT 34.7 (L) 10/03/2019     10/03/2019     10/03/2019     BMP:   Lab Results   Component Value Date     (L) 10/03/2019     (L) 10/03/2019    POTASSIUM 3.5 10/03/2019    POTASSIUM 3.3 (L) 10/03/2019    CHLORIDE 94 10/03/2019    CHLORIDE 94 10/03/2019    CO2 27 10/03/2019    CO2 26 10/03/2019    BUN 14 10/03/2019    BUN 13 10/03/2019    CR 0.61 10/03/2019    CR 0.56 10/03/2019     (H) 10/03/2019     (H) 10/03/2019     COAGS:   Lab Results   Component Value Date    PTT 21 (L) 09/18/2019    INR 0.96 09/18/2019    FIBR 349 09/18/2019     POC:   Lab Results   Component Value Date    BGM 99 10/04/2019    HCG Negative 08/20/2014     OTHER:   Lab Results   Component Value Date    LACT 2.1 (H) 09/20/2019    JOSE ALBERTO 8.7 10/03/2019    PHOS 2.4 (L) 09/21/2019    MAG 1.6 10/03/2019    ALBUMIN 2.5 (L) 10/03/2019    PROTTOTAL 7.0 10/03/2019    ALT 90 (H) 10/03/2019    AST 57 (H) 10/03/2019    GGT 21 08/10/2017    ALKPHOS 955 (H) 10/03/2019    BILITOTAL 1.2 10/03/2019    LIPASE 52 (L) 09/09/2019    AMYLASE 22 (L) 08/23/2019    TSH 5.83 (H) 01/03/2013    T4 0.84 01/03/2013    CRP 70.3 (H)  "09/24/2019    SED 73 (H) 09/24/2019        Preop Vitals    BP Readings from Last 3 Encounters:   10/04/19 92/56   10/03/19 106/54   10/03/19 106/75    Pulse Readings from Last 3 Encounters:   10/03/19 92   10/03/19 104   09/24/19 91      Resp Readings from Last 3 Encounters:   10/04/19 16   10/03/19 20   10/03/19 14    SpO2 Readings from Last 3 Encounters:   10/04/19 95%   10/03/19 96%   10/03/19 98%      Temp Readings from Last 1 Encounters:   10/04/19 36.6  C (97.9  F) (Axillary)    Ht Readings from Last 1 Encounters:   10/03/19 1.651 m (5' 5\")      Wt Readings from Last 1 Encounters:   10/03/19 53.4 kg (117 lb 11.6 oz)    Estimated body mass index is 19.59 kg/m  as calculated from the following:    Height as of 10/3/19: 1.651 m (5' 5\").    Weight as of 10/3/19: 53.4 kg (117 lb 11.6 oz).     LDA:  Peripheral IV 10/04/19 Left Lower forearm (Active)   Site Assessment WDL 10/4/2019  9:00 AM   Line Status Infusing 10/4/2019  9:00 AM   Phlebitis Scale 0-->no symptoms 10/4/2019  9:00 AM   Infiltration Scale 0 10/4/2019  9:00 AM   Extravasation? No 10/4/2019  9:00 AM   Dressing Intervention New dressing  10/4/2019  9:00 AM   Number of days: 0       PICC Double Lumen 09/20/19 Left Basilic (Active)   Number of days: 14        Assessment:   ASA SCORE: 3    H&P: History and physical reviewed and following examination; no interval change.   Smoking Status:  Non-Smoker/Unknown   NPO Status: NPO Appropriate     Plan:   Anes. Type:  MAC   Pre-Medication: None   Induction:  N/a   Airway: Native Airway   Access/Monitoring: PIV   Maintenance: Propofol Sedation     Postop Plan:   Postop Pain: None  Postop Sedation/Airway: Not planned     PONV Management:   Adult Risk Factors: Female, Non-Smoker   Prevention: Ondansetron, Propofol     CONSENT: Direct conversation   Plan and risks discussed with: Patient   Blood Products: Consent Deferred (Minimal Blood Loss)                   Dylon Street MD  "

## 2019-10-04 NOTE — DISCHARGE INSTRUCTIONS
Return for worsening pain, vomiting or new concerns. Follow up with your primary or oncologist for repeat check of sodium and potassium.

## 2019-10-04 NOTE — ANESTHESIA POSTPROCEDURE EVALUATION
Anesthesia POST Procedure Evaluation    Patient: Odalys Nathan   MRN:     [id number] Gender:   female   Age:    59 year old :      1960        Preoperative Diagnosis: Stricture or kinking of ureter [N13.5]   Procedure(s):  CYSTOSCOPY, WITH RETROGRADE PYELOGRAM AND URETERAL STENT INSERTION   Postop Comments: No value filed.       Anesthesia Type:  Not documented  MAC    Reportable Event: NO     PAIN: Uncomplicated   Sign Out status: Comfortable, Well controlled pain     PONV: No PONV   Sign Out status:  No Nausea or Vomiting     Neuro/Psych: Uneventful perioperative course   Sign Out Status: Preoperative baseline; Age appropriate mentation     Airway/Resp.: Uneventful perioperative course   Sign Out Status: Non labored breathing, age appropriate RR; Resp. Status within EXPECTED Parameters     CV: Uneventful perioperative course   Sign Out status: Appropriate BP and perfusion indices; Appropriate HR/Rhythm     Disposition:   Sign Out in:  Phase II  Disposition:  Home  Recovery Course: Uneventful  Follow-Up: Not required           Last Anesthesia Record Vitals:  CRNA VITALS  10/4/2019 0932 - 10/4/2019 1032      10/4/2019             Resp Rate (set):  10          Last PACU Vitals:  Vitals Value Taken Time   BP     Temp     Pulse     Resp     SpO2     Temp src     NIBP 103/60 10/4/2019  9:58 AM   Pulse 87 10/4/2019 10:01 AM   SpO2 100 % 10/4/2019 10:01 AM   Resp     Temp     Ht Rate 86 10/4/2019 10:00 AM   Temp 2           Electronically Signed By: Dylon Street MD, 2019, 11:10 AM

## 2019-10-07 NOTE — LETTER
10/7/2019       RE: Odalys Nathan  [id number]  [place] [facility] [facility] [address]     Dear Colleague,    Thank you for referring your patient, Odalys Nathan, to the [facility], RADIATION ONCOLOGY. Please see a copy of my visit note below.    Name: Odalys Nathan  : 1960 Medical Record #: [id number]  Diagnosis: C56.9 Malignant neoplasm of unspecified ovary  Date of Treatment: 2019  Referring Physicians: Bertin Dewey, Tumor Registry    GAMMA KNIFE DAILY TREATMENT PROCEDURE NOTE     Fraction  of 5  Treatment Summary:  Radiation Oncology - Course: 2 Protocol:   Treatment Site Current Dose Modality From To Elapsed Days Fx.  2 R Frontal Lobe    500 cGy Cobalt 60 10/07/2019 10/07/2019   1              DESCRIPTION OF PROCEDURE:    On 10/07/2019 the patient was brought to the [facility] Gamma Knife IconTM suite at [facility] and treated with fractionated stereotactic radiotherapy.     The [facility] Gamma Knife IconTM Plan software was used to create a highly conformal dose distribution using the number and size collimators detailed above.     TREATMENT:    The patient was brought to the Gamma Knife suite. A timeout was performed to confirm the correct patient and correct procedure.    Patient was identified by 2 methods.  Site was verified.    The mask was placed and a cone beam CT was done and fused to the previously approved plan.        The physicist and I evaluated the fusion and confirmed the target coverage after auto-registration.      The treatment was delivered using the [facility] Gamma Knife IconTM without complication.      The mask was removed and the patient was discharged home in stable condition.    The patient tolerated the treatment well and had no complications.        Approved by:  Scarlet Tellez MD    Again, thank you for allowing me to participate in the care of your patient.      Sincerely,    Scarlet Tellez MD

## 2019-10-07 NOTE — LETTER
10/7/2019       RE: Odalys Nathan  [address]  [place] [address]     Dear Colleague,    Thank you for referring your patient, Odalys Nathan, to the [facility], [place], RADIATION ONCOLOGY. Please see a copy of my visit note below.    Gamma Knife Operative Note    MRN: [id number]  Name: Odalys Nathan  : 1960    Date of procedure: 2019    Surgeon:  Bertin Dewey MD PhD    Pre-operative Diagnosis:   Resection cavity, ovarian metastasis  Post-operative Diagnosis:  Resection cavity, ovarian metastasis    Procedure: Gamma Knife Radiosurgery    Indication: This is a 59 y.o. F with stage IV ovarian cancer and a  metastasis to the right frontal lobe. After case review in the brain tumor conference, the joint recommendation was to treat the patient surgical resection followed by radiosurgery. Standard measurements were obtained for coordinate calculation to facilitate SRS delivery.    On the day of the procedure, informed consent was obtained. The previous MRI was reviewed. The patient was immobilized with a customized face mask    The treatment is planned on the Gamma plan system based on the MRI taken on the day of the procedure.  Treatment parameters were verified by myself, the treating radiation oncologist, and the physicist.     The lesion was treated with 25 Gy delivered in five fractions.  The dose was delivered in a highly conformal fashion. The treatment was performed at the [facility] gamma knife center.     I was present and performed the key portions of this procedure.    Bertin Dewey MD PhD

## 2019-10-07 NOTE — PROGRESS NOTES
Name: Odalys Nathan  : 1960 Medical Record #: [id number]  Diagnosis: C56.9 Malignant neoplasm of unspecified ovary  Date of Treatment: 2019  Referring Physicians: Bertin Dewey, Tumor Registry    GAMMA KNIFE DAILY TREATMENT PROCEDURE NOTE     Fraction  of 5  Treatment Summary:  Radiation Oncology - Course: 2 Protocol:   Treatment Site Current Dose Modality From To Elapsed Days Fx.  2 R Frontal Lobe    500 cGy Cobalt 60 10/07/2019 10/07/2019   1              DESCRIPTION OF PROCEDURE:    On 10/07/2019 the patient was brought to the [facility] Gamma Knife IconTM suite at [facility] and treated with fractionated stereotactic radiotherapy.     The [facility] Gamma Knife IconTM Plan software was used to create a highly conformal dose distribution using the number and size collimators detailed above.     TREATMENT:    The patient was brought to the Gamma Knife suite. A timeout was performed to confirm the correct patient and correct procedure.    Patient was identified by 2 methods.  Site was verified.    The mask was placed and a cone beam CT was done and fused to the previously approved plan.        The physicist and I evaluated the fusion and confirmed the target coverage after auto-registration.      The treatment was delivered using the [facility] Gamma Knife IconTM without complication.      The mask was removed and the patient was discharged home in stable condition.    The patient tolerated the treatment well and had no complications.        Approved by:  Scarlet Tellez MD

## 2019-10-08 NOTE — LETTER
10/8/2019       RE: Odalys Nathan  [address]  [place] [facility] [place] [address]     Dear Colleague,    Thank you for referring your patient, Odlays Nathan, to the [facility], RADIATION ONCOLOGY. Please see a copy of my visit note below.    Name: Odalys Nathan  : 1960 Medical Record #: [id number]  Diagnosis: C56.9 Malignant neoplasm of unspecified ovary  Date of Treatment: 2019  Referring Physicians: Bertin Dewey, Tumor Registry    GAMMA KNIFE DAILY TREATMENT PROCEDURE NOTE     Fraction 2 of 5  Treatment Summary:  Radiation Oncology - Course: 2:   Treatment Site Current Dose Modality From To Elapsed Days Fx.  2 R Frontal Lobe    1,000 cGy Cobalt 60 10/07/2019 10/08/2019 1 2    DESCRIPTION OF PROCEDURE:    On 10/07/2019 Odalys Nathan was brought to the [facility] Gamma Knife IconTM suite at [facility] and treated with fractionated stereotactic radiotherapy.     The [facility] Gamma Knife IconTM Plan software was used to create a highly conformal dose distribution.    TREATMENT:    The patient was brought to the Gamma Knife suite. A timeout was performed to confirm the correct patient and correct procedure.    Patient was identified by 2 methods. Site was verified.    The mask was placed and a cone beam CT was done and fused to the previously approved plan.        The physicist and I evaluated the fusion and confirmed the target coverage after auto-registration.      The treatment was delivered using the [facility] Gamma Knife IconTM without complication.     The mask was removed and the patient was discharged home in stable condition.    The patient tolerated the treatment well and had no complications.          Eleni Shelton MD

## 2019-10-08 NOTE — PROGRESS NOTES
Gamma Knife Operative Note    MRN: [id number]  Name: Odalys Nathan  : 1960    Date of procedure: 2019    Surgeon:  Bertin Dewey MD PhD    Pre-operative Diagnosis:   Resection cavity, ovarian metastasis  Post-operative Diagnosis:  Resection cavity, ovarian metastasis    Procedure: Gamma Knife Radiosurgery    Indication: This is a 59 y.o. F with stage IV ovarian cancer and a  metastasis to the right frontal lobe. After case review in the brain tumor conference, the joint recommendation was to treat the patient surgical resection followed by radiosurgery. Standard measurements were obtained for coordinate calculation to facilitate SRS delivery.    On the day of the procedure, informed consent was obtained. The previous MRI was reviewed. The patient was immobilized with a customized face mask    The treatment is planned on the Gamma plan system based on the MRI taken on the day of the procedure.  Treatment parameters were verified by myself, the treating radiation oncologist, and the physicist.     The lesion was treated with 25 Gy delivered in five fractions.  The dose was delivered in a highly conformal fashion. The treatment was performed at the [facility] gamma knife center.     The diameter of the treated resection cavity was > 3.5 cm.    I was present and performed the key portions of this procedure.    Bertin Dewey MD PhD

## 2019-10-08 NOTE — PROGRESS NOTES
Name: Odalys Nathan  : 1960 Medical Record #: [id number]  Diagnosis: C56.9 Malignant neoplasm of unspecified ovary  Date of Treatment: 2019  Referring Physicians: Bertin Dewey, Tumor Registry    GAMMA KNIFE DAILY TREATMENT PROCEDURE NOTE     Fraction 2 of 5  Treatment Summary:  Radiation Oncology - Course: 2:   Treatment Site Current Dose Modality From To Elapsed Days Fx.  2 R Frontal Lobe    1,000 cGy Cobalt 60 10/07/2019 10/08/2019 1 2    DESCRIPTION OF PROCEDURE:    On 10/07/2019 Odalys Nathan was brought to the [facility] Gamma Knife IconTM suite at [facility] and treated with fractionated stereotactic radiotherapy.     The [facility] Gamma Knife IconTM Plan software was used to create a highly conformal dose distribution.    TREATMENT:    The patient was brought to the Gamma Knife suite. A timeout was performed to confirm the correct patient and correct procedure.    Patient was identified by 2 methods. Site was verified.    The mask was placed and a cone beam CT was done and fused to the previously approved plan.        The physicist and I evaluated the fusion and confirmed the target coverage after auto-registration.      The treatment was delivered using the [facility] Gamma Knife IconTM without complication.     The mask was removed and the patient was discharged home in stable condition.    The patient tolerated the treatment well and had no complications.          Eleni Shelton MD

## 2019-10-09 NOTE — LETTER
10/9/2019       RE: Odalys Nathan  [address] [facility] [address]     Dear Colleague,    Thank you for referring your patient, Odalys Nathan, to the [facility], RADIATION ONCOLOGY. Please see a copy of my visit note below.    Name:                               Odalys Nathan  :                                 1960                                   Medical Record #:     [id number]  Diagnosis:                        C56.9 Malignant neoplasm of unspecified ovary  Date of Treatment:          2019  Referring Physicians:     Bertin Dewey, Tumor Registry     GAMMA KNIFE DAILY TREATMENT PROCEDURE NOTE        Fraction  of 5  Treatment Summary:  Radiation Oncology - Course: 2      Protocol:   Treatment Site             Current Dose                   Modality       From            To              Elapsed Days                            Fx.  2 R Frontal Lobe              1500 cGy     Modena 60      10/07/2019   10/0 2019                        2  3                                                                                                                                                      DESCRIPTION OF PROCEDURE:     On 10/07/2019 the patient was brought to the [place] Gamma Knife IconTM suite at [facility] and treated with fractionated stereotactic radiotherapy.      The [employer] Gamma Knife IconTM Plan software was used to create a highly conformal dose distribution using the number and size collimators detailed above.      TREATMENT:    The patient was brought to the Gamma Knife suite. A timeout was performed to confirm the correct patient and correct procedure.     Patient was identified by 2 methods.  Site was verified.     The mask was placed and a cone beam CT was done and fused to the previously approved plan.         The physicist and I evaluated the fusion and confirmed the target coverage after auto-registration.       The treatment was delivered using the [employer] Gamma Knife IconTM without complication.       The mask was removed and the patient was discharged home in stable condition.     The patient tolerated the treatment well and had no complications.           Approved by:  Scarlet Tellez MD    Again, thank you for allowing me to participate in the care of your patient.      Sincerely,    Scarlet Tellez MD

## 2019-10-09 NOTE — PROGRESS NOTES
"Palliative Care Outpatient Clinic Progress Note    Patient Name:  dOalys Nathan  Primary Provider:  Aubrie Hester    Chief Complaint: Metastatic ovarian cancer, pain    Interim History:  Odalys Nathan 59 year old female returns to be seen by palliative care today.  Patient is seen in clinic today as a follow-up from an inpatient consult.  She is here with her  Marcos and her sister.  She reports overall she is doing relatively well.  She states that her pain has significantly improved over the last few days.  She recently was struggling with constipation and significant abdominal pain.  She attributes now her abdominal pain to the constipation as it resolved after improvement in constipation.  She used senna and MiraLAX to treat the constipation with good effect.  She was started on morphine extended release 15 mg twice daily last Thursday and has been taking oxycodone 5 mg every 6 hours as needed for breakthrough pain.  She reports over the last 2 to 3 days she has had little to no pain.  She only took 1 dose of oxycodone since starting on the morphine.  She had no pain this morning prior to taking the morphine and is wondering if she needs to continue taking it.  She recently had brain metastases resection which she tolerated well.  She reports no pain at the surgical site.  Last episode of vomiting was 3 days ago.  She is been treating nausea with Compazine but denies any recent nausea.  Reports Compazine has good effect.     She reports that she is \"head in the sand\" person when it comes to her illness though she understands that it is progressive despite treatment.  chimed in that he is her designated person to get information, prognostication etc. They are unsure of an exact prognosis but seem to have good understanding of the implications of progressive disease and where she is in the treatment course.     Coping:  She is coping appropriately with good support from " family    Social History:  Pertinent changes to social history/social situation since last visit: no sig changes,  works nights, she is on his insurance which is adequate  Key support resources:  Marcos, family   Advance Directive Status:  Full code at this time,  as healthcare agent , right ear filled out     Social History     Tobacco Use     Smoking status: Former Smoker     Packs/day: 0.00     Years: 8.00     Pack years: 0.00     Types: Cigarettes     Last attempt to quit: 1980     Years since quittin.3     Smokeless tobacco: Never Used     Tobacco comment: started at 11 yo and quit at 18 yo   Substance Use Topics     Alcohol use: Yes     Comment: 3x/day wine or jodi     Drug use: No         No Known Allergies  Current Outpatient Medications   Medication Sig Dispense Refill     albuterol (PROAIR HFA/PROVENTIL HFA/VENTOLIN HFA) 108 (90 Base) MCG/ACT inhaler Inhale 2 puffs into the lungs every 6 hours as needed for shortness of breath / dyspnea or wheezing 1 Inhaler 3     amoxicillin-clavulanate (AUGMENTIN) 875-125 MG tablet Take 1 tablet by mouth 2 times daily 60 tablet 1     Ascorbic Acid (VITAMIN C PO) Take 500 mg by mouth daily        Calcium Carbonate-Vitamin D (CALCIUM + D PO) Take 1 tablet by mouth daily.       clotrimazole 10 MG shane Take 1 Shane (10 mg) by mouth 5 times daily 70 Shane 0     cyanocobalamin (VITAMIN B12) 1000 MCG/ML injection Inject 1 mL (1,000 mcg) into the muscle every 30 days 1 mL 11     dexamethasone (DECADRON) 1.5 MG tablet Take 2 tablets (3 mg) by mouth every 8 hours 8 tablet 0     diphenoxylate-atropine (LOMOTIL) 2.5-0.025 MG per tablet Take 2 tablets by mouth 4 times daily as needed for diarrhea 60 tablet 0     dronabinol (MARINOL) 2.5 MG capsule Take 1 capsule (2.5 mg) by mouth 2 times daily (before meals) 60 capsule 3     Ferrous Sulfate 324 (65 Fe) MG TBEC TAKE ONE TABLET BY MOUTH THREE TIMES DAILY WITH MEALS. TAKE WITH A SMALL AMOUNT OF  ORANGE JUICE. DO NOT TAKE WITH CALCIUM 90 tablet 11     fluconazole (DIFLUCAN) 150 MG tablet TAKE 1 TABLET (150 MG) BY MOUTH ONCE FOR 1 DOSE  0     HEMP OIL OR EXTRACT OR OTHER CBD CANNABINOID, NOT MEDICAL CANNABIS,        HERBALS daily        ipratropium - albuterol 0.5 mg/2.5 mg/3 mL (DUONEB) 0.5-2.5 (3) MG/3ML neb solution Take 1 vial (3 mLs) by nebulization every 6 hours as needed for wheezing or shortness of breath / dyspnea 3 Box 0     LEVOTHYROXINE SODIUM PO Take 50 mcg by mouth daily        loperamide (IMODIUM) 2 MG capsule Take 2 mg by mouth 4 times daily as needed for diarrhea       LORazepam (ATIVAN) 1 MG tablet Take 1 tablet (1 mg) by mouth every 6 hours as needed (Anxiety, Nausea/Vomiting or Sleep) 30 tablet 2     magnesium oxide (MAG-OX) 400 MG tablet Take 1 tablet (400 mg) by mouth 2 times daily 90 tablet 3     morphine (MS CONTIN) 15 MG CR tablet Take 1 tablet (15 mg) by mouth every 12 hours maximum 2 tablet(s) per day 60 tablet 0     naloxone (NARCAN) 4 MG/0.1ML nasal spray Spray 1 spray (4 mg) into one nostril alternating nostrils once as needed for opioid reversal Every 2-3 minutes until patient responsive or EMS arrives 0.2 mL 0     ondansetron (ZOFRAN-ODT) 4 MG ODT tab Take 1-2 tablets (4-8 mg) by mouth every 6 hours as needed for nausea or vomiting 20 tablet 0     order for DME Injection Supplies for Vitamin B12: 3cc syringes w/ 27 gauge needles, 1/2 inch length 3 each 0     oxyCODONE (ROXICODONE) 5 MG tablet Take 1 tablet (5 mg) by mouth every 6 hours as needed for severe pain 30 tablet 0     pantoprazole (PROTONIX) 40 MG EC tablet Take 1 tablet (40 mg) by mouth daily 8 tablet 0     polyethylene glycol (MIRALAX/GLYCOLAX) packet Take 1 packet by mouth daily       potassium chloride (KLOR-CON) 20 MEQ packet Take 20 mEq by mouth daily       prochlorperazine (COMPAZINE) 10 MG tablet Take 1 tablet (10 mg) by mouth every 6 hours as needed for nausea or vomiting 30 tablet 1     tamsulosin (FLOMAX)  0.4 MG capsule Take 1 capsule (0.4 mg) by mouth daily For stent discomfort 30 capsule 11     VITAMIN E NATURAL PO Take 100 Units by mouth daily       dexamethasone (DECADRON) 2 MG tablet Take 1 tablet (2 mg) by mouth every 8 hours for 3 days 9 tablet 0     Past Medical History:   Diagnosis Date     Antiplatelet or antithrombotic long-term use      Ascites      Blood clot in the legs      Diabetes (H)      Ovarian cancer (H)     serous,stg IV     Pleural effusion      Pulmonary embolism (H) 2/2012     Refusal of blood transfusions as patient is Mosque      Short gut syndrome      Subclinical hypothyroidism 4/18/2013     Thrombosis of leg      Past Surgical History:   Procedure Laterality Date     COLECTOMY       COLONOSCOPY  2/1/2012    Procedure:COLONOSCOPY; With Biopsy; Surgeon:TONI SULLIVAN; Location:[facility] OR     CYSTOSCOPY, RETROGRADES, INSERT STENT URETER(S), COMBINED Right 10/4/2019    Procedure: CYSTOSCOPY, WITH RETROGRADE PYELOGRAM AND URETERAL STENT INSERTION;  Surgeon: Wolf Gan MD;  Location: [facility] OR     ENDOSCOPIC RETROGRADE CHOLANGIOPANCREATOGRAM N/A 8/23/2019    Procedure: Endoscopic Retrograde Cholangiopancreatogram with 4 mm Hurricane Balloon Dilation, gallbladder stent and Bile Duct stent placements, Bilarry Sphincterotomy ;  Surgeon: Catrachito Lloyd MD;  Location: [facility] OR     HYSTERECTOMY TOTAL ABD, RHIANNA SALPINGO-OOPHORECTOMY, NODE DISSECTION, TUMOR DEBULKING, COMBINED  2/1/2012    Procedure:COMBINED HYSTERECTOMY TOTAL ABDOMINAL, BILATERAL SALPINGO-OOPHORECTOMY, NODE DISSECTION, TUMOR DEBULKING;  Exploratory Laparotomy, Total Abdominal Hysterectomy, Bilateral Salpingo-Oophorectomy, appendectomy,lysis of adhesions, ileal, ascending, transverse and splenic flexure resection, ileal descending bowel renanastomosis, incidental cystotomy repair, CUSA procedure and colonoscopy ; [facility]     INSERT PORT PERITONEAL ACCESS  4/3/2012    Procedure:INSERT PORT PERITONEAL ACCESS; Intraperitoneal Port  Placement (c-arm); Surgeon:SAMUEL CARRASCO; Location:[facility] OR     INSERT PORT PERITONEAL ACCESS  5/14/2014    Procedure: INSERT PORT PERITONEAL ACCESS;  Surgeon: Nga Yeung MD;  Location: [facility] OR     INSERT PORT VASCULAR ACCESS       IR PORT REMOVAL RIGHT  9/20/2019     LAPAROSCOPY DIAGNOSTIC (GYN)  5/14/2014    Procedure: LAPAROSCOPY DIAGNOSTIC (GYN);  Surgeon: Nga Yeung MD;  Location: [facility] OR     LAPAROTOMY EXPLORATORY Right 11/25/2015    Procedure: LAPAROTOMY EXPLORATORY;  Surgeon: Nga Yeung MD;  Location: [facility] OR     LAPAROTOMY, TUMOR DEBULKING, COMBINED  5/14/2014    Procedure: COMBINED LAPAROTOMY, TUMOR DEBULKING;  Surgeon: Nga Yeung MD;  Location: [facility] OR     OPTICAL TRACKING SYSTEM CRANIOTOMY, EXCISE TUMOR, COMBINED Right 9/18/2019    Procedure: Stealth Assisted Right Craniotomy And Tumor Resection;  Surgeon: Bertin Dewey MD;  Location: [facility] OR     PICC INSERTION Left 09/20/2019    5Fr - 46cm (4cm external), basilic vein, low SVC     REMOVE CATHETER PERITONEAL N/A 8/20/2014    Procedure: REMOVE CATHETER PERITONEAL;  Surgeon: Nga Yeung MD;  Location: [facility] OR     VASCULAR SURGERY      stent left iliac vein     Family history: mother, lung cancer    Review of Systems:   ROS: 10 point ROS neg other than the symptoms noted above in the HPI and pertinents here:  Palliative Symptom Review (0=no symptom/no concern, 1=mild, 2=moderate, 3=severe):      Pain: 0       Fatigue: 1      Nausea: 1      Constipation: 0      Diarrhea: 0      Depressive Symptoms: 0      Anxiety: 1      Drowsiness: 0      Poor Appetite: 1      Shortness of Breath: 1      Insomnia: 0      Other: 0      Overall (0 good/no concerns, 3 very poor):  5           Physical Exam:   Vitals were reviewed  Constitutional:   awake, alert, cooperative, no apparent distress, and appears stated age     Eyes:   sclera clear, no icterus     ENT:   Right frontal surgical scar, clean, dry intact, no erythema  or drainage. Mouth dry no lesions     Lungs:   no increased work of breathing, lungs ctab     Cardiovascular:   no edema, RRR s1s2     Abdomen:   non-distended, nontender     Musculoskeletal:   tone is normal     Neurologic:   Mental Status Exam:  Level of Alertness:   awake  Orientation:   person, place, time  Memory:   normal  Fund of Knowledge:  normal  Attention/Concentration:  normal  Cranial Nerves:  cranial nerves II-XII are grossly intact     Neuropsychiatric:   General: normal, calm and normal eye contact  Level of consciousness: alert / normal  Affect: normal     Skin:   no lesions on exposed skin         Key Data Reviewed:  LABS:  Results for MAURY MERA (MRN [id number]) as of 10/10/2019 15:52   Ref. Range 10/3/2019 18:58   Sodium Latest Ref Range: 133 - 144 mmol/L 130 (L)   Potassium Latest Ref Range: 3.4 - 5.3 mmol/L 3.5   Chloride Latest Ref Range: 94 - 109 mmol/L 94   Carbon Dioxide Latest Ref Range: 20 - 32 mmol/L 27   Urea Nitrogen Latest Ref Range: 7 - 30 mg/dL 14   Creatinine Latest Ref Range: 0.52 - 1.04 mg/dL 0.61   GFR Estimate Latest Ref Range: >60 mL/min/1.73_m2 >90   GFR Estimate If Black Latest Ref Range: >60 mL/min/1.73_m2 >90   Calcium Latest Ref Range: 8.5 - 10.1 mg/dL 8.7   Anion Gap Latest Ref Range: 3 - 14 mmol/L 9   Glucose Latest Ref Range: 70 - 99 mg/dL 107 (H)   WBC Latest Ref Range: 4.0 - 11.0 10e9/L 10.3   Hemoglobin Latest Ref Range: 11.7 - 15.7 g/dL 11.2 (L)   Hematocrit Latest Ref Range: 35.0 - 47.0 % 34.7 (L)   Platelet Count Latest Ref Range: 150 - 450 10e9/L 263       IMAGING:   Brain MR 10/3:  Impression: Limited imaging primarily for the purposes of stereotactic  localization demonstrates the right frontal enhancing lesions  surrounding the resection site in a patient with a history of brain  metastasis, being indeterminate for residual tumor. No new metastasis  identified.    Impression & Recommendations & Counseling:    Recurent metastatic ovarian  cancer  Neoplasm related pain    Overall, despite the evidence of disease and recent pain flair, Odalys is doing relatively well. She did have asymptomatic hypotension on arrival vitals which resolved without intervention during visit. She has been pain free and would really like to try getting off opioids. We advised on weaning regimen for morphine and appropriate use of oxycodone. She is is going to try taking the ER morphine just at night for the next few days and still having no pain will discontinue completely. She inquired about use of medical cannabis for pain and nausea and after discussing risks and benefits, submitted for approval.     Augustine Lee MD   Palliative Care Fellow  Staffed with Dr. Frank Wheeler  October 10, 2019    Attending Note:  Patient seen and evaluated with Dr Lee and I agree with/confirm their findings/recs in this note. Long discussion about opioid tapering, it may be too early but she wants to try. We're ok with her continuing MSContin bid for a while if she ends up preferring that.  Discussed progressive, advanced  Nature of her cancer.     Thank you for involving us in the patient's care.   Frank Wheeler MD / Palliative Medicine / Text me via [facility] - search for 'Summer' - then click the pager icon / My clinic is in the [facility]: [phone] (scheduling); [phone] (triage). [facility] Inpatient Team Consult Pager [phone] (answered 8am-430pm M-F) - prefer text pages via [employer] / Palliative After-Hours Answering Service [phone].

## 2019-10-09 NOTE — LETTER
"10/9/2019       RE: Odalys Nathan  [address]  [facility] [address]     Dear Colleague,    Thank you for referring your patient, Odalys Nathan, to the [facility] CANCER [facility] at [facility]. Please see a copy of my visit note below.    Palliative Care Outpatient Clinic Progress Note    Patient Name:  Odalys Nathan  Primary Provider:  Aubrie Hester    Chief Complaint: Metastatic ovarian cancer, pain    Interim History:  Odalys Nathan 59 year old female returns to be seen by palliative care today.  Patient is seen in clinic today as a follow-up from an inpatient consult.  She is here with her  Marcos and her sister.  She reports overall she is doing relatively well.  She states that her pain has significantly improved over the last few days.  She recently was struggling with constipation and significant abdominal pain.  She attributes now her abdominal pain to the constipation as it resolved after improvement in constipation.  She used senna and MiraLAX to treat the constipation with good effect.  She was started on morphine extended release 15 mg twice daily last Thursday and has been taking oxycodone 5 mg every 6 hours as needed for breakthrough pain.  She reports over the last 2 to 3 days she has had little to no pain.  She only took 1 dose of oxycodone since starting on the morphine.  She had no pain this morning prior to taking the morphine and is wondering if she needs to continue taking it.  She recently had brain metastases resection which she tolerated well.  She reports no pain at the surgical site.  Last episode of vomiting was 3 days ago.  She is been treating nausea with Compazine but denies any recent nausea.  Reports Compazine has good effect.     She reports that she is \"head in the sand\" person when it comes to her illness though she understands that it is progressive despite treatment.  chimed in that he " is her designated person to get information, prognostication etc. They are unsure of an exact prognosis but seem to have good understanding of the implications of progressive disease and where she is in the treatment course.     Coping:  She is coping appropriately with good support from family    Social History:  Pertinent changes to social history/social situation since last visit: no sig changes,  works nights, she is on his insurance which is adequate  Key support resources:  Marcos, family   Advance Directive Status:  Full code at this time,  as healthcare agent , right ear filled out     Social History     Tobacco Use     Smoking status: Former Smoker     Packs/day: 0.00     Years: 8.00     Pack years: 0.00     Types: Cigarettes     Last attempt to quit: 1980     Years since quittin.3     Smokeless tobacco: Never Used     Tobacco comment: started at 9 yo and quit at 16 yo   Substance Use Topics     Alcohol use: Yes     Comment: 3x/day wine or jodi     Drug use: No         No Known Allergies  Current Outpatient Medications   Medication Sig Dispense Refill     albuterol (PROAIR HFA/PROVENTIL HFA/VENTOLIN HFA) 108 (90 Base) MCG/ACT inhaler Inhale 2 puffs into the lungs every 6 hours as needed for shortness of breath / dyspnea or wheezing 1 Inhaler 3     amoxicillin-clavulanate (AUGMENTIN) 875-125 MG tablet Take 1 tablet by mouth 2 times daily 60 tablet 1     Ascorbic Acid (VITAMIN C PO) Take 500 mg by mouth daily        Calcium Carbonate-Vitamin D (CALCIUM + D PO) Take 1 tablet by mouth daily.       clotrimazole 10 MG shane Take 1 Shane (10 mg) by mouth 5 times daily 70 Shane 0     cyanocobalamin (VITAMIN B12) 1000 MCG/ML injection Inject 1 mL (1,000 mcg) into the muscle every 30 days 1 mL 11     dexamethasone (DECADRON) 1.5 MG tablet Take 2 tablets (3 mg) by mouth every 8 hours 8 tablet 0     diphenoxylate-atropine (LOMOTIL) 2.5-0.025 MG per tablet Take 2 tablets by mouth 4  times daily as needed for diarrhea 60 tablet 0     dronabinol (MARINOL) 2.5 MG capsule Take 1 capsule (2.5 mg) by mouth 2 times daily (before meals) 60 capsule 3     Ferrous Sulfate 324 (65 Fe) MG TBEC TAKE ONE TABLET BY MOUTH THREE TIMES DAILY WITH MEALS. TAKE WITH A SMALL AMOUNT OF ORANGE JUICE. DO NOT TAKE WITH CALCIUM 90 tablet 11     fluconazole (DIFLUCAN) 150 MG tablet TAKE 1 TABLET (150 MG) BY MOUTH ONCE FOR 1 DOSE  0     HEMP OIL OR EXTRACT OR OTHER CBD CANNABINOID, NOT MEDICAL CANNABIS,        HERBALS daily        ipratropium - albuterol 0.5 mg/2.5 mg/3 mL (DUONEB) 0.5-2.5 (3) MG/3ML neb solution Take 1 vial (3 mLs) by nebulization every 6 hours as needed for wheezing or shortness of breath / dyspnea 3 Box 0     LEVOTHYROXINE SODIUM PO Take 50 mcg by mouth daily        loperamide (IMODIUM) 2 MG capsule Take 2 mg by mouth 4 times daily as needed for diarrhea       LORazepam (ATIVAN) 1 MG tablet Take 1 tablet (1 mg) by mouth every 6 hours as needed (Anxiety, Nausea/Vomiting or Sleep) 30 tablet 2     magnesium oxide (MAG-OX) 400 MG tablet Take 1 tablet (400 mg) by mouth 2 times daily 90 tablet 3     morphine (MS CONTIN) 15 MG CR tablet Take 1 tablet (15 mg) by mouth every 12 hours maximum 2 tablet(s) per day 60 tablet 0     naloxone (NARCAN) 4 MG/0.1ML nasal spray Spray 1 spray (4 mg) into one nostril alternating nostrils once as needed for opioid reversal Every 2-3 minutes until patient responsive or EMS arrives 0.2 mL 0     ondansetron (ZOFRAN-ODT) 4 MG ODT tab Take 1-2 tablets (4-8 mg) by mouth every 6 hours as needed for nausea or vomiting 20 tablet 0     order for DME Injection Supplies for Vitamin B12: 3cc syringes w/ 27 gauge needles, 1/2 inch length 3 each 0     oxyCODONE (ROXICODONE) 5 MG tablet Take 1 tablet (5 mg) by mouth every 6 hours as needed for severe pain 30 tablet 0     pantoprazole (PROTONIX) 40 MG EC tablet Take 1 tablet (40 mg) by mouth daily 8 tablet 0     polyethylene glycol  (MIRALAX/GLYCOLAX) packet Take 1 packet by mouth daily       potassium chloride (KLOR-CON) 20 MEQ packet Take 20 mEq by mouth daily       prochlorperazine (COMPAZINE) 10 MG tablet Take 1 tablet (10 mg) by mouth every 6 hours as needed for nausea or vomiting 30 tablet 1     tamsulosin (FLOMAX) 0.4 MG capsule Take 1 capsule (0.4 mg) by mouth daily For stent discomfort 30 capsule 11     VITAMIN E NATURAL PO Take 100 Units by mouth daily       dexamethasone (DECADRON) 2 MG tablet Take 1 tablet (2 mg) by mouth every 8 hours for 3 days 9 tablet 0     Past Medical History:   Diagnosis Date     Antiplatelet or antithrombotic long-term use      Ascites      Blood clot in the legs      Diabetes (H)      Ovarian cancer (H)     serous,stg IV     Pleural effusion      Pulmonary embolism (H) 2/2012     Refusal of blood transfusions as patient is Confucianist      Short gut syndrome      Subclinical hypothyroidism 4/18/2013     Thrombosis of leg      Past Surgical History:   Procedure Laterality Date     COLECTOMY       COLONOSCOPY  2/1/2012    Procedure:COLONOSCOPY; With Biopsy; Surgeon:TONI SULLIVAN; Location:[facility] OR     CYSTOSCOPY, RETROGRADES, INSERT STENT URETER(S), COMBINED Right 10/4/2019    Procedure: CYSTOSCOPY, WITH RETROGRADE PYELOGRAM AND URETERAL STENT INSERTION;  Surgeon: Wolf Gan MD;  [facility] OR     ENDOSCOPIC RETROGRADE CHOLANGIOPANCREATOGRAM N/A 8/23/2019    Procedure: Endoscopic Retrograde Cholangiopancreatogram with 4 mm Hurricane Balloon Dilation, gallbladder stent and Bile Duct stent placements, Bilarry Sphincterotomy ;  Surgeon: Catrachito Lloyd MD;  Location: [facility] OR     HYSTERECTOMY TOTAL ABD, RHIANNA SALPINGO-OOPHORECTOMY, NODE DISSECTION, TUMOR DEBULKING, COMBINED  2/1/2012    Procedure:COMBINED HYSTERECTOMY TOTAL ABDOMINAL, BILATERAL SALPINGO-OOPHORECTOMY, NODE DISSECTION, TUMOR DEBULKING;  Exploratory Laparotomy, Total Abdominal Hysterectomy, Bilateral Salpingo-Oophorectomy,  appendectomy,lysis of adhesions, ileal, ascending, transverse and splenic flexure resection, ileal descending bowel renanastomosis, incidental cystotomy repair, CUSA procedure and colonoscopy ; Curtis     INSERT PORT PERITONEAL ACCESS  4/3/2012    Procedure:INSERT PORT PERITONEAL ACCESS; Intraperitoneal Port Placement (c-arm); Surgeon:SAMUEL CARRASCO; Location:[facility] OR     INSERT PORT PERITONEAL ACCESS  5/14/2014    Procedure: INSERT PORT PERITONEAL ACCESS;  Surgeon: Nga Yeung MD;  Location: [facility] OR     INSERT PORT VASCULAR ACCESS       IR PORT REMOVAL RIGHT  9/20/2019     LAPAROSCOPY DIAGNOSTIC (GYN)  5/14/2014    Procedure: LAPAROSCOPY DIAGNOSTIC (GYN);  Surgeon: Nga Yeung MD;  Location: [facility] OR     LAPAROTOMY EXPLORATORY Right 11/25/2015    Procedure: LAPAROTOMY EXPLORATORY;  Surgeon: Nga Yeung MD;  [facility]     LAPAROTOMY, TUMOR DEBULKING, COMBINED  5/14/2014    Procedure: COMBINED LAPAROTOMY, TUMOR DEBULKING;  Surgeon: Nga Yeung MD;  Location: [facility] OR     OPTICAL TRACKING SYSTEM CRANIOTOMY, EXCISE TUMOR, COMBINED Right 9/18/2019    Procedure: Stealth Assisted Right Craniotomy And Tumor Resection;  Surgeon: Bertin Dewey MD;  Location: [facility] OR     PICC INSERTION Left 09/20/2019    5Fr - 46cm (4cm external), basilic vein, low SVC     REMOVE CATHETER PERITONEAL N/A 8/20/2014    Procedure: REMOVE CATHETER PERITONEAL;  Surgeon: Nga Yeung MD;  Location: [facility] OR     VASCULAR SURGERY      stent left iliac vein     Family history: mother, lung cancer    Review of Systems:   ROS: 10 point ROS neg other than the symptoms noted above in the HPI and pertinents here:  Palliative Symptom Review (0=no symptom/no concern, 1=mild, 2=moderate, 3=severe):      Pain: 0       Fatigue: 1      Nausea: 1      Constipation: 0      Diarrhea: 0      Depressive Symptoms: 0      Anxiety: 1      Drowsiness: 0      Poor Appetite: 1      Shortness of Breath: 1      Insomnia: 0       Other: 0      Overall (0 good/no concerns, 3 very poor):  5           Physical Exam:   Vitals were reviewed  Constitutional:   awake, alert, cooperative, no apparent distress, and appears stated age     Eyes:   sclera clear, no icterus     ENT:   Right frontal surgical scar, clean, dry intact, no erythema or drainage. Mouth dry no lesions     Lungs:   no increased work of breathing, lungs ctab     Cardiovascular:   no edema, RRR s1s2     Abdomen:   non-distended, nontender     Musculoskeletal:   tone is normal     Neurologic:   Mental Status Exam:  Level of Alertness:   awake  Orientation:   person, place, time  Memory:   normal  Fund of Knowledge:  normal  Attention/Concentration:  normal  Cranial Nerves:  cranial nerves II-XII are grossly intact     Neuropsychiatric:   General: normal, calm and normal eye contact  Level of consciousness: alert / normal  Affect: normal     Skin:   no lesions on exposed skin         Key Data Reviewed:  LABS:  Results for MAURY MERA (MRN [id number]) as of 10/10/2019 15:52   Ref. Range 10/3/2019 18:58   Sodium Latest Ref Range: 133 - 144 mmol/L 130 (L)   Potassium Latest Ref Range: 3.4 - 5.3 mmol/L 3.5   Chloride Latest Ref Range: 94 - 109 mmol/L 94   Carbon Dioxide Latest Ref Range: 20 - 32 mmol/L 27   Urea Nitrogen Latest Ref Range: 7 - 30 mg/dL 14   Creatinine Latest Ref Range: 0.52 - 1.04 mg/dL 0.61   GFR Estimate Latest Ref Range: >60 mL/min/1.73_m2 >90   GFR Estimate If Black Latest Ref Range: >60 mL/min/1.73_m2 >90   Calcium Latest Ref Range: 8.5 - 10.1 mg/dL 8.7   Anion Gap Latest Ref Range: 3 - 14 mmol/L 9   Glucose Latest Ref Range: 70 - 99 mg/dL 107 (H)   WBC Latest Ref Range: 4.0 - 11.0 10e9/L 10.3   Hemoglobin Latest Ref Range: 11.7 - 15.7 g/dL 11.2 (L)   Hematocrit Latest Ref Range: 35.0 - 47.0 % 34.7 (L)   Platelet Count Latest Ref Range: 150 - 450 10e9/L 263       IMAGING:   Brain MR 10/3:  Impression: Limited imaging primarily for the purposes of  stereotactic  localization demonstrates the right frontal enhancing lesions  surrounding the resection site in a patient with a history of brain  metastasis, being indeterminate for residual tumor. No new metastasis  identified.    Impression & Recommendations & Counseling:    Recurent metastatic ovarian cancer  Neoplasm related pain    Overall, despite the evidence of disease and recent pain flair, Odalys is doing relatively well. She did have asymptomatic hypotension on arrival vitals which resolved without intervention during visit. She has been pain free and would really like to try getting off opioids. We advised on weaning regimen for morphine and appropriate use of oxycodone. She is is going to try taking the ER morphine just at night for the next few days and still having no pain will discontinue completely. She inquired about use of medical cannabis for pain and nausea and after discussing risks and benefits, submitted for approval.     Augustine Lee MD   Palliative Care Fellow  Staffed with Dr. Frank Wheeler  October 10, 2019    Attending Note:  Patient seen and evaluated with Dr Lee and I agree with/confirm their findings/recs in this note. Long discussion about opioid tapering, it may be too early but she wants to try. We're ok with her continuing MSContin bid for a while if she ends up preferring that.  Discussed progressive, advanced  Nature of her cancer.     Thank you for involving us in the patient's care.   Frank Wheeler MD / Palliative Medicine / Text me via [employer] - search for 'Summer' - then click the pager icon / My clinic is in the [facility]: [phone] (scheduling); [phone] (triage). [facility] Inpatient Team Consult Pager [phone] (answered 8am-430pm M-F) - prefer text pages via [employer] / Palliative After-Hours Answering Service [phone].

## 2019-10-09 NOTE — NURSING NOTE
"Oncology Rooming Note    October 9, 2019 12:41 PM   Odalys Nathan is a 59 year old female who presents for:    Chief Complaint   Patient presents with     Oncology Clinic Visit     RETURN VISIT; PALLIATIVE CARE; VITALS TAKEN      Chief Complaint   Patient presents with     Oncology Clinic Visit     RETURN VISIT; PALLIATIVE CARE; OVARIAN CANCER; VITALS TAKEN        Initial Vitals: BP (!) 85/56   Pulse 115   Temp 98.4  F (36.9  C) (Oral)   Resp 16   Ht 1.651 m (5' 5\")   Wt 55 kg (121 lb 4.8 oz)   SpO2 97%   BMI 20.19 kg/m   Estimated body mass index is 20.19 kg/m  as calculated from the following:    Height as of this encounter: 1.651 m (5' 5\").    Weight as of this encounter: 55 kg (121 lb 4.8 oz). Body surface area is 1.59 meters squared.  No Pain (0) Comment: Data Unavailable   No LMP recorded. Patient has had a hysterectomy.  Allergies reviewed: Yes  Medications reviewed: Yes    Medications: Medication refills not needed today.  Pharmacy name entered into [facility]:    [id number] - [place], [place] - [address]  [facility] [place], [place] - [address] [facility] [place], [place] - [address] [facility]    Clinical concerns: No new concerns today  Dr Lee was notified.      Carmen Chatman"

## 2019-10-09 NOTE — PROGRESS NOTES
Name:                               Odalys Nathan  :                                 1960                                   Medical Record #:     [id number]  Diagnosis:                        C56.9 Malignant neoplasm of unspecified ovary  Date of Treatment:          2019  Referring Physicians:     Bertin Dewey, Tumor Registry     GAMMA KNIFE DAILY TREATMENT PROCEDURE NOTE        Fraction  of 5  Treatment Summary:  Radiation Oncology - Course: 2      Protocol:   Treatment Site             Current Dose                   Modality       From            To              Elapsed Days                            Fx.  2 R Frontal Lobe             1500 cGy     [place] 60      10/07/2019   10/09/2019                        2  3                                                                                                                                                      DESCRIPTION OF PROCEDURE:     On 10/07/2019 the patient was brought to the [facility] Gamma Knife IconTM suite at [facility] and treated with fractionated stereotactic radiotherapy.      The [place] Gamma Knife IconTM Plan software was used to create a highly conformal dose distribution using the number and size collimators detailed above.      TREATMENT:    The patient was brought to the Gamma Knife suite. A timeout was performed to confirm the correct patient and correct procedure.     Patient was identified by 2 methods.  Site was verified.     The mask was placed and a cone beam CT was done and fused to the previously approved plan.         The physicist and I evaluated the fusion and confirmed the target coverage after auto-registration.      The treatment was delivered using the [place] Gamma Knife IconTM without complication.       The mask was removed and the patient was discharged home in stable condition.     The patient tolerated the treatment well and had no  complications.           Approved by:  Scarlet Tellez MD

## 2019-10-10 NOTE — PROGRESS NOTES
Name:                               Odalys Nathan  :                                 1960                                   Medical Record #:     [id number]  Diagnosis:                        C56.9 Malignant neoplasm of unspecified ovary  Date of Treatment:          October 10, 2019  Referring Physicians:     Bertin Dewey, Tumor Registry     GAMMA KNIFE DAILY TREATMENT PROCEDURE NOTE         Fraction 4  of 5  Treatment Summary:  Radiation Oncology - Course: 2      Protocol:   Treatment Site             Current Dose                   Modality       From            To              Elapsed Days                            Fx.  2 R Frontal Lobe             2000 cGy     [place] 60      10/07/2019   10/10/2019                                   3                      4           DESCRIPTION OF PROCEDURE:     On 10/10/2019 the patient was brought to the [facility] Gamma Knife Icon[facility] with fractionated stereotactic radiotherapy.      The [place] Gamma Knife IconTM Plan software was used to create a highly conformal dose distribution using the number and size collimators detailed above.      TREATMENT:    The patient was brought to the Gamma Knife suite. A timeout was performed to confirm the correct patient and correct procedure.     Patient was identified by 2 methods.  Site was verified.     The mask was placed and a cone beam CT was done and fused to the previously approved plan.         The physicist and I evaluated the fusion and confirmed the target coverage after auto-registration.      The treatment was delivered using the [place] Gamma Knife IconTM without complication.       The mask was removed and the patient was discharged home in stable condition.     The patient tolerated the treatment well and had no complications.        Scarlet Tellez MD

## 2019-10-10 NOTE — LETTER
10/10/2019       RE: Odalys Nathan  [address]  [facility] [address]     Dear Colleague,    Thank you for referring your patient, Odalys Nathan, to the RADIATION ONCOLOGY CLINIC. Please see a copy of my visit note below.    [facility] PHYSICIANS  SPECIALIZING IN BREAKTHROUGHS  Radiation Oncology    On Treatment Visit Note      Odalys Nathan      Date: 10/10/2019   MRN: [id number]   : 1960         Reason for Visit:  On Radiation Treatment Visit     Treatment Summary to Date   R Frontal Lobe  2000 cGy/2500 cGy   4 fraction/5 fractions          ED Visit/Hosiptal Admission: None     Treatment Breaks: None     Subjective:  Odalys is tolerating the GK SRS well.  She has completed 4 out of the 5 planned treatment.  She has mild fatigue, but doesn't attribute it to radiation.  Mild headache on Monday, but none since.  No new neurologic symptoms.       Objective:   There were no vitals taken for this visit.  Gen: Appears well, in no acute distress  Skin: No erythema    Labs:  CBC RESULTS:   Recent Labs   Lab Test 10/03/19  1858   WBC 10.3   RBC 3.59*   HGB 11.2*   HCT 34.7*   MCV 97   MCH 31.2   MCHC 32.3   RDW 15.9*        ELECTROLYTES:  Recent Labs   Lab Test 10/03/19  1858   *   POTASSIUM 3.5   CHLORIDE 94   JOSE ALBERTO 8.7   CO2 27   BUN 14   CR 0.61   *       Assessment:    Tolerating radiation therapy well.  All questions and concerns addressed.    Toxicities:  Fatigue: Grade 0: No toxicity  Headache: Grade 0: No toxicity  Dermatitis: Grade 0: No toxicity    Plan:   1. Continue current therapy.    2. Follow-up with Gyn Onc and Dr. Dewey as scheduled.       [employer] chart and setup information reviewed  MVCT/IGRT images checked                  Scarlet Tellez MD/PhD  [phone] clinic  Pager [phone]    Please do not send letter to referring physician.      Again, thank you for allowing me to participate in the care of your patient.       Sincerely,    Scarlet Tellez MD

## 2019-10-10 NOTE — PROGRESS NOTES
[facility] PHYSICIANS  SPECIALIZING IN BREAKTHROUGHS  Radiation Oncology    On Treatment Visit Note      Odalys Nathan      Date: 10/10/2019   MRN: [id number]   : 1960         Reason for Visit:  On Radiation Treatment Visit     Treatment Summary to Date   R Frontal Lobe  2000 cGy/2500 cGy   4 fraction/5 fractions          ED Visit/Hosiptal Admission: None     Treatment Breaks: None     Subjective:  Odalys is tolerating the GK SRS well.  She has completed 4 out of the 5 planned treatment.  She has mild fatigue, but doesn't attribute it to radiation.  Mild headache on Monday, but none since.  No new neurologic symptoms.       Objective:   There were no vitals taken for this visit.  Gen: Appears well, in no acute distress  Skin: No erythema    Labs:  CBC RESULTS:   Recent Labs   Lab Test 10/03/19  1858   WBC 10.3   RBC 3.59*   HGB 11.2*   HCT 34.7*   MCV 97   MCH 31.2   MCHC 32.3   RDW 15.9*        ELECTROLYTES:  Recent Labs   Lab Test 10/03/19  1858   *   POTASSIUM 3.5   CHLORIDE 94   JOSE ALBERTO 8.7   CO2 27   BUN 14   CR 0.61   *       Assessment:    Tolerating radiation therapy well.  All questions and concerns addressed.    Toxicities:  Fatigue: Grade 0: No toxicity  Headache: Grade 0: No toxicity  Dermatitis: Grade 0: No toxicity    Plan:   1. Continue current therapy.    2. Follow-up with Gyn Onc and Dr. Dewey as scheduled.       [employer] chart and setup information reviewed  MVCT/IGRT images checked                  Scarlet Tellez MD/PhD  [phone] clinic  Pager [phone]    Please do not send letter to referring physician.

## 2019-10-10 NOTE — LETTER
Date:October 11, 2019      Provider requested that no letter be sent. Do not send.       [facility] Health Information

## 2019-10-11 NOTE — LETTER
10/11/2019       RE: Odalys Nathan  [address] [facility] [address]     Dear Colleague,    Thank you for referring your patient, Odalys Nathan, to the [facility], RADIATION ONCOLOGY. Please see a copy of my visit note below.    Name:                               Odalys Nathan  :                                 1960                                   Medical Record #:     [id number]  Diagnosis:                        C56.9 Malignant neoplasm of unspecified ovary  Date of Treatment:          October 10, 2019  Referring Physicians:     Bertin Dewey, Tumor Registry     GAMMA KNIFE DAILY TREATMENT PROCEDURE NOTE         Fraction  5 of 5  Treatment Summary:  Radiation Oncology - Course: 2      Protocol:   Treatment Site             Current Dose    Modality       From            To              Elapsed Days                         Fx.  2 R Frontal Lobe             2000 cGy     [place] 60      10/07/2019   10/11/2019          4                               5           DESCRIPTION OF PROCEDURE:     On 10/11/2019 the patient was brought to the [facility] Gamma Knife IconTM suite at [facility] and treated with fractionated stereotactic radiotherapy.      The [employer] Gamma Knife IconTM Plan software was used to create a highly conformal dose distribution using the number and size collimators detailed above.      TREATMENT:    The patient was brought to the Gamma Knife suite. A timeout was performed to confirm the correct patient and correct procedure.     Patient was identified by 2 methods.  Site was verified.     The mask was placed and a cone beam CT was done and fused to the previously approved plan.         The physicist and I evaluated the fusion and confirmed the target coverage after auto-registration.      The treatment was delivered using the [place] Gamma Knife IconTM without complication.       The mask was removed and the patient was  discharged home in stable condition.     The patient tolerated the treatment well and had no complications.           Scarlet Tellez MD    Again, thank you for allowing me to participate in the care of your patient.      Sincerely,    Scarlet Tellez MD

## 2019-10-11 NOTE — TELEPHONE ENCOUNTER
Left message to call back to schedule renal US in 3-4 weeks Riddhi Veras October 11, 2019 10:46 AM    \

## 2019-10-11 NOTE — TELEPHONE ENCOUNTER
----- Message from Maria Teresa Pineda RN sent at 10/10/2019  7:16 PM CDT -----  Please see below and call and schedule accordingly. Orders are placed. Thank you  ----- Message -----  From: Wolf Gan MD  Sent: 10/9/2019   3:01 PM CDT  To: AKBAR Jones,  Could you please set her up for a renal ultrasound in 3-4 weeks.  She doesn't necessarily need an appointment after. Thank you, David Gan.

## 2019-10-11 NOTE — PROGRESS NOTES
Name:                               Odalys Nathan  :                                 1960                                   Medical Record #:     [id number]  Diagnosis:                        C56.9 Malignant neoplasm of unspecified ovary  Date of Treatment:          October 10, 2019  Referring Physicians:     Bertin Dewey, Tumor Registry     GAMMA KNIFE DAILY TREATMENT PROCEDURE NOTE         Fraction 5 of 5  Treatment Summary:  Radiation Oncology - Course: 2      Protocol:   Treatment Site             Current Dose    Modality       From            To              Elapsed Days                         Fx.  2 R Frontal Lobe             2000 cGy     [place] 60      10/07/2019   10/11/2019          4                               5           DESCRIPTION OF PROCEDURE:     On 10/11/2019 the patient was brought to the [facility] Gamma Knife IconTM suite at [facility] and treated with fractionated stereotactic radiotherapy.      The [employer] Gamma Knife IconTM Plan software was used to create a highly conformal dose distribution using the number and size collimators detailed above.      TREATMENT:    The patient was brought to the Gamma Knife suite. A timeout was performed to confirm the correct patient and correct procedure.     Patient was identified by 2 methods.  Site was verified.     The mask was placed and a cone beam CT was done and fused to the previously approved plan.         The physicist and I evaluated the fusion and confirmed the target coverage after auto-registration.      The treatment was delivered using the [place] Gamma Knife IconTM without complication.       The mask was removed and the patient was discharged home in stable condition.     The patient tolerated the treatment well and had no complications.           Scarlet Tellez MD

## 2019-10-14 NOTE — TELEPHONE ENCOUNTER
A call was placed to Odalys in follow up to her Gamma Knife fractionated treatments ending 10/11/19.  Odalys said she was quite tired out but otherwise thought she tolerated the Gamma Knife OK.  Odalys denied a headache and said she was eating with no nausea.

## 2019-10-15 NOTE — LETTER
10/15/2019       RE: Odalys Nathan  [address]  [facility] [address]     Dear Colleague,    Thank you for referring your patient, Odalys Nathan, to the [facility] AND INFECTIOUS DISEASES at [facility]. Please see a copy of my visit note below.        Infectious Disease Note    Type and reason for visit    SUBJECTIVE:        OBJECTIVE:    Insert meds    On exam:  Insert vs    Labs:  Reviewed    Imaging:      ASSESSMENT:    History of Klebsiella bacteremia. s/p appropriate course with ceftriaxone, but now on Augmentin suppression.    Patient is planned for hepatic stent exchange 11/15/19.   She had Klebsiella bacteremia of unknown source that cleared quickly.  She remains on Augmentin with concern that the hepatic stent may be the source. At the time of bacteremia, there were no specific signs of biliary tree or hepatic source, specifically no right upper quadrant pain and no change in transaminases/alk phos.    The hepatic stent is only planned for exchange, not removal.   Patient would be at risk for a biliary tree source, but also for a transient bacterial translocation from the gut, a port infection (port now out), and/or multiple other possibilities.  We discussed today the pros/cons of continuing Augmentin given these uncertainties.  Patient planned to begin chemotherapy this week, and is currently complaining of constipation.    We decided that she could discuss this further with her oncology team, and we could be flexible.  I would recommend to stop the Augmentin when the patient and her other medical team members are comfortable.    PLAN:    -  XR today to assess for stool burden in anticipation of discussion for stool regimen with Oncology    -  Continue Augmentin for now, but would recommend to stop soon.  Would work out constipation issues first, to avoid any bacterial translocation from this.  I would stop before any new central  line is placed.    -  We made a follow-up appt for 11/22/19, so she would have one in place.  I am willing to overbook earlier if needed.  Please message me by EPIC inbox to discuss.      Rita Santana MD, MS  Infectious Disease    Time:  I spent xx minutes in direct care with this patient, including >50% of time face-to-face with the patient counseling about ...

## 2019-10-15 NOTE — Clinical Note
10/15/2019      RE: Odalys Nathan  [address]  [facility] [address]           Infectious Disease Note    Type and reason for visit    SUBJECTIVE:        OBJECTIVE:    Insert meds    On exam:  Insert vs    Labs:  Reviewed    Imaging:      ASSESSMENT:    History of Klebsiella bacteremia. s/p appropriate course with ceftriaxone, but now on Augmentin suppression.    Patient is planned for hepatic stent exchange 11/15/19.   She had Klebsiella bacteremia of unknown source that cleared quickly.  She remains on Augmentin with concern that the hepatic stent may be the source. At the time of bacteremia, there were no specific signs of biliary tree or hepatic source, specifically no right upper quadrant pain and no change in transaminases/alk phos.    The hepatic stent is only planned for exchange, not removal.   Patient would be at risk for a biliary tree source, but also for a transient bacterial translocation from the gut, a port infection (port now out), and/or multiple other possibilities.  We discussed today the pros/cons of continuing Augmentin given these uncertainties.  Patient planned to begin chemotherapy this week, and is currently complaining of constipation.    We decided that she could discuss this further with her oncology team, and we could be flexible.  I would recommend to stop the Augmentin when the patient and her other medical team members are comfortable.    PLAN:    -  XR today to assess for stool burden in anticipation of discussion for stool regimen with Oncology    -  Continue Augmentin for now, but would recommend to stop soon.  Would work out constipation issues first, to avoid any bacterial translocation from this.  I would stop before any new central line is placed.    -  We made a follow-up appt for 11/22/19, so she would have one in place.  I am willing to overbook earlier if needed.  Please message me by EPIC inbox to discuss.      Rita Santana MD, MS  Infectious  Disease    Time:  I spent xx minutes in direct care with this patient, including >50% of time face-to-face with the patient counseling about ...    Rita Santana MD

## 2019-10-15 NOTE — LETTER
10/15/2019       RE: Odalys Nathan  [address]  [facility] [address]     Dear Colleague,    Thank you for referring your patient, Odalys Nathan, to the [facility] AND INFECTIOUS DISEASES at [facility]. Please see a copy of my visit note below.    Infectious Disease Note    Type and reason for visit    SUBJECTIVE:    OBJECTIVE:    Insert meds    On exam:  Insert vs    Labs:  Reviewed    Imaging:      ASSESSMENT:    History of Klebsiella bacteremia. s/p appropriate course with ceftriaxone, but now on Augmentin suppression.    Patient is planned for hepatic stent exchange 11/15/19.   She had Klebsiella bacteremia of unknown source that cleared quickly.  She remains on Augmentin with concern that the hepatic stent may be the source. At the time of bacteremia, there were no specific signs of biliary tree or hepatic source, specifically no right upper quadrant pain and no change in transaminases/alk phos.    The hepatic stent is only planned for exchange, not removal.   Patient would be at risk for a biliary tree source, but also for a transient bacterial translocation from the gut, a port infection (port now out), and/or multiple other possibilities.  We discussed today the pros/cons of continuing Augmentin given these uncertainties.  Patient planned to begin chemotherapy this week, and is currently complaining of constipation.    We decided that she could discuss this further with her oncology team, and we could be flexible.  I would recommend to stop the Augmentin when the patient and her other medical team members are comfortable.    PLAN:    -  XR today to assess for stool burden in anticipation of discussion for stool regimen with Oncology    -  Continue Augmentin for now, but would recommend to stop soon.  Would work out constipation issues first, to avoid any bacterial translocation from this.  I would stop before any new central line is  placed.    -  We made a follow-up appt for 11/22/19, so she would have one in place.  I am willing to overbook earlier if needed.  Please message me by EPIC inbox to discuss.      Rita Santana MD, MS  Infectious Disease    Time:  I spent xx minutes in direct care with this patient, including >50% of time face-to-face with the patient counseling about ...

## 2019-10-15 NOTE — NURSING NOTE
"Chief Complaint   Patient presents with     Hospital F/U     Klebsiella pneumoniae        Vital signs:  Temp: 97.8  F (36.6  C) Temp src: Oral BP: 91/62 Pulse: 105   Resp: 18 SpO2: 97 %     Height: 165.1 cm (5' 5\") Weight: 54.7 kg (120 lb 8 oz)  Estimated body mass index is 20.05 kg/m  as calculated from the following:    Height as of this encounter: 1.651 m (5' 5\").    Weight as of this encounter: 54.7 kg (120 lb 8 oz).          Lizzy Jones, [facility]  10/15/2019 12:53 PM      "

## 2019-10-16 PROBLEM — G89.3 NEOPLASM RELATED PAIN: Status: ACTIVE | Noted: 2019-01-01

## 2019-10-16 NOTE — PROGRESS NOTES
"    Infectious Disease Note    Hospital discharge follow-up for Klebsiella bacteremia    HISTORY OF THE PRESENT ILLNESS:    Odalys Nathan is a 58 yo woman with a complex medical history.  She is presenting here ~ 4 weeks after hospital discharge, and she was seen inpatient by the Infectious Disease consultation team. The last progress note was from my colleague Dr. Hitchcock.  Much of the history that follows (in italics) is from Dr. Hitchcock's documentation:    Ms. Nathan is a Judaism, with recurrent ovarian cancer (on Gemzar), originally diagnosed in 2012, s/p colon resection in 2012, and now s/p tumor resection of a metastatic brain lesion this past 9/18/19.    As for the recent infection history:    Ms. Nathan presented 9/9 for worsening fatigue, abdominal pain, SOB, weight loss of 25 lbs, and headaches for 3-4 weeks. She was found to have Klebsiella pneumoniae bacteremia which grew from cultures obtained from her port-a-cath on 9/9 and 9/10. No peripheral cultures were drawn at that time. Blood cultures were no growth as of 9/11.     She had undergone an ERCP on 8/23 for RUQ pain thought to be related to chronic cholecystitis and had a transpapillary gallbladder stent placed. It was noted during the ERCP per GI that there was \"significant intrahepatic biliary stenosis from her metastatic disease, despite normal bilirubin, but proceeded with biliary stenting due to contaminating these systems and impending obstruction. Only the left system was able to be stented, and they could not get a stent into the right system.\"    Upon admission 9/9, it was noted that her bilirubin had rinsed to 2.1.      Given this collection of information, the source of the bacteremia was thought to be from the biliary tract.   An ERCP was planned, but then postponed when the metastatic brain lesion was identified. Ms. Nathan was discharged home on IV daily ceftriaxone on 9/12 and readmitted on 9/17 for tumor resection of the mass " lesion in the right anterior frontal lobe.      Port was removed and a new PICC was placed on 9/20/19, after a transthoracic echocardiogram revealed no valvular vegetations on 9/19.    Ms. Nathan received 2 weeks of ceftriaxone (9/11 - 9/25) and was transitioned to Augmentin 875/125 mg po BID thereafter.  From her account, it sounds like she had a short window of 1-2 days without any antibiotics before she received the Augmentin.  She has not had any fevers.  She continues to have a lot of abdominal pain, which she thinks may be related to constipation.   She is actively receiving radiation therapy, has a new stent in her kidney, and might get a new port for chemotherapy soon.   Her team is concerned about placing a new port for fear that she would become bacteremic again.    OBJECTIVE:    Current Outpatient Medications   Medication     albuterol (PROAIR HFA/PROVENTIL HFA/VENTOLIN HFA) 108 (90 Base) MCG/ACT inhaler     Ascorbic Acid (VITAMIN C PO)     Calcium Carbonate-Vitamin D (CALCIUM + D PO)     cyanocobalamin (VITAMIN B12) 1000 MCG/ML injection     diphenoxylate-atropine (LOMOTIL) 2.5-0.025 MG per tablet     dronabinol (MARINOL) 2.5 MG capsule     Ferrous Sulfate 324 (65 Fe) MG TBEC     HEMP OIL OR EXTRACT OR OTHER CBD CANNABINOID, NOT MEDICAL CANNABIS,     HERBALS     ipratropium - albuterol 0.5 mg/2.5 mg/3 mL (DUONEB) 0.5-2.5 (3) MG/3ML neb solution     LEVOTHYROXINE SODIUM PO     loperamide (IMODIUM) 2 MG capsule     LORazepam (ATIVAN) 1 MG tablet     magnesium oxide (MAG-OX) 400 MG tablet     oxyCODONE (ROXICODONE) 5 MG tablet     polyethylene glycol (MIRALAX/GLYCOLAX) packet     potassium chloride (KLOR-CON) 20 MEQ packet     prochlorperazine (COMPAZINE) 10 MG tablet     tamsulosin (FLOMAX) 0.4 MG capsule     VITAMIN E NATURAL PO     guaiFENesin (MUCINEX) 600 MG 12 hr tablet     medical cannabis (Patient's own supply)     morphine (MS CONTIN) 15 MG CR tablet     naloxone (NARCAN) 4 MG/0.1ML nasal spray      "ondansetron (ZOFRAN-ODT) 4 MG ODT tab     pantoprazole (PROTONIX) 40 MG EC tablet     No current facility-administered medications for this visit.      On exam:  BP 91/62 (BP Location: Right arm, Patient Position: Sitting, Cuff Size: Adult Small)   Pulse 105   Temp 97.8  F (36.6  C) (Oral)   Resp 18   Ht 1.651 m (5' 5\")   Wt 54.7 kg (120 lb 8 oz)   SpO2 97%   BMI 20.05 kg/m      No central venous access.  Abdomen is distended, generally tender but not especially in the right upper quadrant.     Labs:  Reviewed  Last WBC on 10/3 was 10.3  Last CMP on 10/3 with T bili 1.2, ALT 90, AST 57      ASSESSMENT:    History of Klebsiella bacteremia. s/p appropriate course with ceftriaxone, but now on Augmentin suppression.    Patient is planned for hepatic stent exchange 11/15/19.   She had Klebsiella bacteremia of unknown source that cleared quickly.  She remains on Augmentin with concern that the hepatic stent may be the source. At the time of bacteremia, there were no specific signs of biliary tree or hepatic source, specifically no right upper quadrant pain. The team pointed out that on 9/5 total bili was 1.0 and 9/9 total bili was 2.1.    The hepatic stent is only planned for exchange, not removal.   Patient would be at risk for a biliary tree source, but also for a transient bacterial translocation from the gut, a port infection (port now out), and/or multiple other possibilities.  We discussed today the pros/cons of continuing Augmentin given these uncertainties.  Patient planned to begin chemotherapy this week, and is currently complaining of constipation.    We decided that she could discuss this further with her oncology team, and we could be flexible.  I would recommend to stop the Augmentin when the patient and her other medical team members are comfortable.    PLAN:    -  XR today to assess for stool burden in anticipation of discussion for stool regimen with Oncology    -  Continue Augmentin for now, but " would recommend to stop soon.  Would work out constipation issues first, to avoid any bacterial translocation from this.  I would stop before any new central line is placed.    -  We made a follow-up appt for 11/22/19, so she would have one in place.  I am willing to overbook earlier if needed.  Please message me by EPIC inbox to discuss.      Rita Santana MD, MS  Infectious Disease    Time:  I spent 40 minutes in direct care with this patient, including >50% of time face-to-face with the patient counseling about our rationale and plan of care.

## 2019-10-17 PROBLEM — R62.7 FAILURE TO THRIVE SYNDROME, ADULT: Status: ACTIVE | Noted: 2019-01-01

## 2019-10-17 NOTE — PROGRESS NOTES
This is a recent snapshot of the patient's [facility] medical record.  For current drug dose and complete information and questions, call [phone] or In [facility] pool, [facility] home infusion ([id number])  CSN Number:  [id number]

## 2019-10-17 NOTE — DISCHARGE SUMMARY
Gynecologic Oncology Discharge Summary    Odalys Nathan  [id number]    Admit Date: 10/17/2019  Discharge Date: 10/20/2019  Admitting Provider: Garry Riggins MD   Discharge Provider: Garry Riggins MD    Admission Dx:   - Recurrent ovarian cancer with mets to the liver and brain  - Retroperitoneal and mesenteric adenopathy  - Electrolyte abnormalities (Hyponatremia, Hypokalemia, Hypomagnesemia)  - Failure to thrive     Discharge Dx:  - Electrolyte abnormalities (Hyponatremia, Hypokalemia, Hypomagnesemia) s/p repletion  - Bilateral pleural effusion, s/p right thoracentesis  - Recurrent ovarian cancer with mets to the liver and brain  - Retroperitoneal and mesenteric adenopathy  - Failure to thrive   - Severe malnutrition    Patient Active Problem List   Diagnosis     Partial small bowel obstruction (H)     Ascites     Metastatic cancer (H)     Acute blood loss anemia     Poor nutrition     Elevated liver enzymes     Pulmonary embolism (H)     Diarrhea     Subclinical hypothyroidism     S/P exploratory laparotomy     Drug-induced neutropenia (H)     Hypomagnesemia     Ovarian cancer, right (H)     Ovarian cancer, left (H)     Anemia, unspecified type     Patient in cancer related research study     Encounter for long-term (current) use of medications     Encounter for antineoplastic chemotherapy     Ovarian cancer (H)     Brain tumor (H)     Stricture or kinking of ureter     Neoplasm related pain     Failure to thrive syndrome, adult       Procedures:   Electrolyte repletion  Thoracentesis     Prior to Admission Medications:  Medications Prior to Admission   Medication Sig Dispense Refill Last Dose     albuterol (PROAIR HFA/PROVENTIL HFA/VENTOLIN HFA) 108 (90 Base) MCG/ACT inhaler Inhale 2 puffs into the lungs every 6 hours as needed for shortness of breath / dyspnea or wheezing 1 Inhaler 3 10/16/2019 at Unknown time     amoxicillin-clavulanate (AUGMENTIN) 875-125 MG tablet Take 1 tablet by mouth 2 times  daily 60 tablet 1 10/17/2019 at Unknown time     Ascorbic Acid (VITAMIN C PO) Take 500 mg by mouth daily    10/16/2019 at Unknown time     Calcium Carbonate-Vitamin D (CALCIUM + D PO) Take 1 tablet by mouth daily.   Past Month at Unknown time     clotrimazole 10 MG rell Take 1 Rell (10 mg) by mouth 5 times daily 70 Rell 0 Past Month at Unknown time     cyanocobalamin (VITAMIN B12) 1000 MCG/ML injection Inject 1 mL (1,000 mcg) into the muscle every 30 days 1 mL 11 10/13/2019 at Unknown time     dronabinol (MARINOL) 2.5 MG capsule Take 1 capsule (2.5 mg) by mouth 2 times daily (before meals) 60 capsule 3 10/17/2019 at Unknown time     Ferrous Sulfate 324 (65 Fe) MG TBEC TAKE ONE TABLET BY MOUTH THREE TIMES DAILY WITH MEALS. TAKE WITH A SMALL AMOUNT OF ORANGE JUICE. DO NOT TAKE WITH CALCIUM 90 tablet 11 10/16/2019 at Unknown time     HEMP OIL OR EXTRACT OR OTHER CBD CANNABINOID, NOT MEDICAL CANNABIS,    10/17/2019 at Unknown time     HERBALS daily    10/16/2019 at Unknown time     ipratropium - albuterol 0.5 mg/2.5 mg/3 mL (DUONEB) 0.5-2.5 (3) MG/3ML neb solution Take 1 vial (3 mLs) by nebulization every 6 hours as needed for wheezing or shortness of breath / dyspnea 3 Box 0 10/17/2019 at Unknown time     LEVOTHYROXINE SODIUM PO Take 50 mcg by mouth daily    10/17/2019 at Unknown time     loperamide (IMODIUM) 2 MG capsule Take 2 mg by mouth 4 times daily as needed for diarrhea   Past Month at Unknown time     LORazepam (ATIVAN) 1 MG tablet Take 1 tablet (1 mg) by mouth every 6 hours as needed (Anxiety, Nausea/Vomiting or Sleep) 30 tablet 2 Past Month at Unknown time     morphine (MS CONTIN) 15 MG CR tablet Take 1 tablet (15 mg) by mouth every 12 hours maximum 2 tablet(s) per day 60 tablet 0 10/17/2019 at Unknown time     ondansetron (ZOFRAN-ODT) 4 MG ODT tab Take 1-2 tablets (4-8 mg) by mouth every 6 hours as needed for nausea or vomiting 20 tablet 0 Past Month at Unknown time     oxyCODONE (ROXICODONE) 5 MG  tablet Take 1 tablet (5 mg) by mouth every 6 hours as needed for severe pain 30 tablet 0 Past Week at Unknown time     pantoprazole (PROTONIX) 40 MG EC tablet Take 1 tablet (40 mg) by mouth daily 8 tablet 0 Past Month at Unknown time     polyethylene glycol (MIRALAX/GLYCOLAX) packet Take 1 packet by mouth daily   10/16/2019 at Unknown time     prochlorperazine (COMPAZINE) 10 MG tablet Take 1 tablet (10 mg) by mouth every 6 hours as needed for nausea or vomiting 30 tablet 1 Past Week at Unknown time     tamsulosin (FLOMAX) 0.4 MG capsule Take 1 capsule (0.4 mg) by mouth daily For stent discomfort 30 capsule 11 10/17/2019 at Unknown time     VITAMIN E NATURAL PO Take 100 Units by mouth daily   Past Week at Unknown time     dexamethasone (DECADRON) 2 MG tablet Take 1 tablet (2 mg) by mouth every 8 hours for 3 days 9 tablet 0 Taking     diphenoxylate-atropine (LOMOTIL) 2.5-0.025 MG per tablet Take 2 tablets by mouth 4 times daily as needed for diarrhea 60 tablet 0 More than a month at Unknown time     magnesium oxide (MAG-OX) 400 MG tablet Take 1 tablet (400 mg) by mouth 2 times daily 90 tablet 3 Taking     medical cannabis (Patient's own supply) (The purpose of this order is to document that the patient reports taking medical cannabis.  This is not a prescription, and is not used to certify that the patient has a qualifying medical condition.) 0 Information only 0 Taking     naloxone (NARCAN) 4 MG/0.1ML nasal spray Spray 1 spray (4 mg) into one nostril alternating nostrils once as needed for opioid reversal Every 2-3 minutes until patient responsive or EMS arrives 0.2 mL 0 Taking     order for DME Injection Supplies for Vitamin B12: 3cc syringes w/ 27 gauge needles, 1/2 inch length 3 each 0 Taking     potassium chloride (KLOR-CON) 20 MEQ packet Take 20 mEq by mouth daily   More than a month at Unknown time       Discharge Medications:   Odalys Nathan   Home Medication Instructions STEPH:[id number]    Printed  on:10/19/19 1205   Medication Information                      albuterol (PROAIR HFA/PROVENTIL HFA/VENTOLIN HFA) 108 (90 Base) MCG/ACT inhaler  Inhale 2 puffs into the lungs every 6 hours as needed for shortness of breath / dyspnea or wheezing             amoxicillin-clavulanate (AUGMENTIN) 875-125 MG tablet  Take 1 tablet by mouth 2 times daily             Ascorbic Acid (VITAMIN C PO)  Take 500 mg by mouth daily              Calcium Carbonate-Vitamin D (CALCIUM + D PO)  Take 1 tablet by mouth daily.             clotrimazole 10 MG rell  Take 1 Rell (10 mg) by mouth 5 times daily             cyanocobalamin (VITAMIN B12) 1000 MCG/ML injection  Inject 1 mL (1,000 mcg) into the muscle every 30 days             diphenoxylate-atropine (LOMOTIL) 2.5-0.025 MG per tablet  Take 2 tablets by mouth 4 times daily as needed for diarrhea             dronabinol (MARINOL) 2.5 MG capsule  Take 1 capsule (2.5 mg) by mouth 2 times daily (before meals)             Ferrous Sulfate 324 (65 Fe) MG TBEC  TAKE ONE TABLET BY MOUTH THREE TIMES DAILY WITH MEALS. TAKE WITH A SMALL AMOUNT OF ORANGE JUICE. DO NOT TAKE WITH CALCIUM             HEMP OIL OR EXTRACT OR OTHER CBD CANNABINOID, NOT MEDICAL CANNABIS,               HERBALS  daily              ipratropium - albuterol 0.5 mg/2.5 mg/3 mL (DUONEB) 0.5-2.5 (3) MG/3ML neb solution  Take 1 vial (3 mLs) by nebulization every 6 hours as needed for wheezing or shortness of breath / dyspnea             LEVOTHYROXINE SODIUM PO  Take 50 mcg by mouth daily              loperamide (IMODIUM) 2 MG capsule  Take 2 mg by mouth 4 times daily as needed for diarrhea             LORazepam (ATIVAN) 1 MG tablet  Take 1 tablet (1 mg) by mouth every 6 hours as needed (Anxiety, Nausea/Vomiting or Sleep)             magnesium oxide (MAG-OX) 400 MG tablet  Take 1 tablet (400 mg) by mouth 2 times daily             medical cannabis (Patient's own supply)  (The purpose of this order is to document that the patient  reports taking medical cannabis.  This is not a prescription, and is not used to certify that the patient has a qualifying medical condition.)             morphine (MS CONTIN) 15 MG CR tablet  Take 1 tablet (15 mg) by mouth every 12 hours maximum 2 tablet(s) per day             naloxone (NARCAN) 4 MG/0.1ML nasal spray  Spray 1 spray (4 mg) into one nostril alternating nostrils once as needed for opioid reversal Every 2-3 minutes until patient responsive or EMS arrives             ondansetron (ZOFRAN-ODT) 4 MG ODT tab  Take 1-2 tablets (4-8 mg) by mouth every 6 hours as needed for nausea or vomiting             order for DME  Injection Supplies for Vitamin B12: 3cc syringes w/ 27 gauge needles, 1/2 inch length             oxyCODONE (ROXICODONE) 5 MG tablet  Take 1 tablet (5 mg) by mouth every 6 hours as needed for severe pain             pantoprazole (PROTONIX) 40 MG EC tablet  Take 1 tablet (40 mg) by mouth daily             polyethylene glycol (MIRALAX/GLYCOLAX) packet  Take 1 packet by mouth daily             potassium chloride (KLOR-CON) 20 MEQ packet  Take 20 mEq by mouth daily             prochlorperazine (COMPAZINE) 10 MG tablet  Take 1 tablet (10 mg) by mouth every 6 hours as needed for nausea or vomiting             tamsulosin (FLOMAX) 0.4 MG capsule  Take 1 capsule (0.4 mg) by mouth daily For stent discomfort             VITAMIN E NATURAL PO  Take 100 Units by mouth daily               Consultations:   Nutrition, Palliative, Procedural IM Team    Brief History of Illness:  This patient is a 59 year old female with recurrent ovarian cancer who was admitted from the clinic on 10/17 for electrolyte abnormalities, GROVES, and poor PO intake.     She recently had recurrence of disease with brain and liver metastasis. She was hospitalized 9/9-9/14 for Klebsiella bacteremia 2/2 possible cholangitis, she has a hx of biliary stent and has plan for ERCP 11/2019, she is on Augmentin until procedure. She then underwent  craniotomy and surgical resection of a brain metastasis 9/18 followed by gamma knife radiation 10/7. Also with bilateral hydronephrosis R>L, for which ureteral stent was placed 10/4. She was initially evaluated in clinic for consideration of cycle one of single agent carboplatin and reported recent worsened fatigue, weakness, poor apatite, and GROVES for which she uses a nebulizer twice daily. She has an extensive history of colon resection and is followed by palliative care and treated with MS Contin and oxycodone, which causes fluctuation between diarrhea and constipation at baseline. Upon admission, she was treated with electrolyte replacement therapy.    Hospital Course:  Dz:   -  Recurrent ovarian cancer in the setting of recent resection of brain mets and gamma knife radiation. When stabilized, plan for single-agent Carboplatin.     FEN:   - She was maintained on  0.9% NS infusion until HD#3.  Her Na improved from 126 to 134 after NS infusion and restriction of free water. By discharge, she was tolerating a regular diet without nausea and vomiting and able to maintain her hydration without IVF supplementation. She began use of medical marijuana on HD#2 to supplement appetite stimulation and tolerated it well. Nutrition was consulted due to nutrition supplements. She received additional Boost supplements and was nearly meeting PO intake goals. Nutrition referral placed for outpatient management.  Pain:   - Her pain was initially controlled on PTA MS Contin and oxycodone. Her pain was well controlled on this and she was discharged home with these medications and continued outpatient Palliative follow up.   CV:   - She has no history of CV issues.  Her vital signs were stable while in house and she had no acute CV issues.  PULM:   - She was continued on PTA PRN nebulizer while in house for GROVES. CXR on 10/15 had previously shown bilateral pleural effusions and these were again demonstrated on CXR on 10/18. Ultrasound  of the lungs showed drainable fluid collection at the right lung base. Thoracentesis was performed on 10/19 for 2180cc clear yellow fluid. Repeat CXR post-procedure showed significantly decreased right pleural effusion.  HEME:   - Her admission Hgb was 11.8.  Her hgb dropped to 10.1 on 10/18, likely due to dilution from NS infusion. She did not have any symptoms of anemia while in house. She had no other acute heme issues while in house.  GI:   - Alk PO4 970 on HD#1. Biliary stent in place. Stool sent for C Diff which was negative. Due tp her ongoing oral candidal infection, PTA clotrimazole continued. At the time of discharge, she was tolerating a regular diet without nausea and vomiting.  She will be discharged with her PTA bowel regimen.  :    - HD#1 UA + blood, 70 protein, leukocytes, WBC. Right ureteral stent in place. PTA Flomax continued.   ID:   - The patient was AF during her hospitalization. Augmentin continued for recent Klebsiella pneumonia bacteremia to continue until ERCP. Follows with ID outpatient.  ENDO:   - Hypothyroidism. Treated with PTA synthroid.   PSYCH/NEURO:   - No acute issues  PPX:    - She was given SCDs and IS during her hospital course.  She tolerated these prophylactic interventions without incident.  They were discontinued at the time of her discharge.      Discharge Instructions and Follow up:  Odalys Nathan was discharged from the hospital with instructions for the following:    Discharge Diet: Regular, fluid restriction to 2L daily, Boost Plus between meals  Discharge Activity: Activity as tolerated  Discharge Follow up: labs on Monday, 10/22; follow-up in clinic with Dr. Yeung on Thursday, 10/24  Nutrition referral placed for follow up in 2 weeks    Discharge Disposition:  Discharged to home    Discharge Staff: Garry Salgado MD  Obstetrics & Gynecology, PGY-2  10/20/2019 10:36 AM      Provider Disclosure:   I agree with above History, Review of  Systems, Physical exam and Plan. I have reviewed the content of the documentation and have edited it as needed. I have seen and personally performed the services documented here and the documentation accurately represents those services and the decisions I have made.     Electronically signed by:   Garry Riggins MD   Gynecologic Oncology   [facility]

## 2019-10-17 NOTE — NURSING NOTE
Patricia Rocha NP requested admission to [facility] for observation.  Pt dx with Recurrent Ovarian Cancer with brain mets and electrolyte imbalances.  Pt received 4 gms Magnesium and approximately 30 meq IV per infusion nurse in the clinic prior to discharge.  Pt's  will transport pt to [facility] and pt and family comfortable with plan.  Report called to Norma Carpenter at [facility].  Agueda Green RN, BSN, OCN

## 2019-10-17 NOTE — PLAN OF CARE
VSS on RA, other than . Denies pain or SOB. Tolerating regular diet. Labs drawn, continues low Na 127, but K 3.6 and Mg 1.6 WNL and don't need replacement. Awaiting for IV from pharmacy to initiate MIVF. Pt up perez. Continue with POC.

## 2019-10-17 NOTE — PROGRESS NOTES
Gynecologic Oncology Follow-Up Note    RE: Odalys Nathan  MRN: [id number]  : 1960  Date of Visit: 10/17/2019    CC: Odalys Nathan is a 59 year old year old female with recurrent ovarian cancer who presents today for follow up regarding disease management.    HPI: Odalys comes to the clinic accompanied by her  Marcos and her sister Carmen. Odalys comes to the clinic feeling fair- she recently underwent surgical resection of a brain metastasis followed by gamma knife radiation. She was also recently hospitalized for Klebsiella bacteremia of unknown source, though she does have a biliary stent. She also has a recently placed ureteral stent. On Augmentin due to concern that biliary stent may be the source of her bacteremia.  She presents today for consideration of cycle one single agent carboplatin. She reports she has been fatigued, somewhat weak. Not eating or drinking well at home- reports that dysgeusia is the biggest factor in not eating well. Was able to drink a Boost shake today which was surprising to her and her family. She is not currently nauseated but her family reports she had nausea with vomiting three days ago. She also reports abdominal pain that improves with a bowel movement- feels constipated but has been having liquid stools the past few days. Three bouts of diarrhea yesterday, two today already. Of note, does take Miralax twice daily to prevent constipation. Abdominal pain does bother her- takes MS Contin BID but has not yet taken this today. She also notes increased urinary frequency, dysuria, and voiding small amounts- taking Flomax since her ureteral stent placement but still remains symptomatic. Denies hematuria or flank pain. Denies fevers or chills, no bleeding. Does note decreased activity tolerance and GROVES, denies SOB at rest or persistent cough. Has been requiring nebulizer twice daily for GROVES. Denies chest pain, palpitations, falls. She does not feel her  mentation has significantly changed, though her  notes her short term memory is worsened. Her  and sister also note that Odalys had right foot drop the past couple of weeks- per Odalys, this resolved a week ago but she still has problems with R dorsiflexion. Denies severe headaches, changes in her vision, gait disturbance, weakness, slurred speech, changes in sensation.      Oncology History:  1/18/2012 - Admitted to hospital for 2 weeks of intermittent abdominal cramping, distention, diarrhea and N/V. CT of abdomen/pelvis significant for small bowel obstruction, a heterogenous soft tissue density in the pelvis, omental nodules, and ascites. Bilateral adnexal masses per U/S of pelvis. CA-125 was elevated at 987, CEA was normal at 1.0.    1/18/12 - Therapeutic paracentesis (4 L) with cytology confirming malignancy (IN positive, weak ER positive, CK-7 positive consistent with GYN primary). Surgery recommended d/t potential for falling blood counts 2/2 chemotherapy (patient is [employer] and limiting blood transfusion)    2/1/12 - Exploratory laparotomy, MAR BSO, lysis of adhesion, Appendectomy, Repair cystotomy, Omentectomy [id number]-moqoqxoon-transverse-colon resection, Ileo-descending colon anastomosis, CUSA, & Colonoscopy (done by Dr. Amato and colorectal team).  2/20/2012 - Admitted to hospital for bilateral pulmonary emboli and drainage of pleural effusion. Started on Lovenox.  2/28/12-3/21/12: Cycle #1-2 Carbo/Taxol IV  3/29/12 - Started on Keflex by her PCP for infection in her healing wound (immediately below her umbilicus).    4/11/12-6/14/12: Cycle #3-6 IV chemo, Cycle #1 IV/IP chemo  7/16/12 -  8, CT PRADEEP - enrolled in  - observation arm  3/4/13-6/28/13:  6, 9, 12, 15, 20.  7/1/13: CT Chest, Abdomen, Pelvis IMPRESSION:  1. Worsening metastatic ovarian carcinoma suggested by increased size of soft tissue nodules anterior to the right psoas muscle, that may represent  growing mesenteric lymphadenopathy.  2. Remaining prominent right lower quadrant mesenteric lymph nodes are not significantly changed from CT 7/16/2012.  3. Clustered nodular opacities in the right lower lobe are not significant change from 7/16/2012 and remain indeterminate. Again, the appearance and distribution is suggestive of an infectious etiology.  4. Stable 8 mm soft tissue nodule in left breast, unchanged since at least 02/20/2012. This can be observed on followup studies, but correlation with mammography could be considered.  Decision made to start Doxil/Carbo.  7/11/13: The left ventricular ejection fraction is normal at 66.4%.  7/12/13-9/6/13: Cycle #1-3 Doxil/Carbo.  10, 11.    9/30/13 CT C/A/P Impression:  1. Overall, favorable response to treatment with decreasing size of soft tissue nodules tracking along the anterior aspect of the right psoas muscle.  2. 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  3. Improved cluster of right lower lobe pulmonary nodular opacities. These may represent resolving infection.  10/3/13-12/9/13:  9, 8, 10. Cycle 4-6 Doxil/Carbo.    1/13/14 CT C/A/P Impression:   1. Stable appearance of metastatic ovarian cancer. Scattered soft tissue nodules along the anterior aspect of the right psoas muscle are unchanged in size. Mild mesenteric lymphadenopathy is unchanged.  2. Clustered micronodules in the right lower lobe are unchanged from 9/30/2013, but improved from 7/1/2013. This history suggests a postinflammatory/postinfectious etiology.  3. 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  1/16/14 Discussed multiple options for her based on relatively stable-appearing disease on CT but slight increase in  (which has had small increase to 20 with last recurrence) including chemo break with recheck of  in 1 month, starting new chemo agent immediately, and exploratory surgery with possible resection of nodules. She is considered platinum-sensitive  based on > 1 year remission after Taxol/Carbo, which we will take into consideration for future chemo planning. I am not inclined to surgery at this time given difficulty she already has with diarrhea secondary to past colon resection. I suspect we would need to resect further bowel due to mesenteric disease. Also explained inherent risks of any major surgery. Also mentioned maintenance chemo, but this has not been shown to increase overall survival and would likely decrease her quality of life without significant benefit. Family is going on vacation to [place] in 2 weeks and Odalys does not want to have chemo prior to that, so will plan to take 1 month break. She can have  at that time (discussed checking toady since last draw was in early December, but as it would likely not change treatment plan and she has h/o slow rising , will not check today).  2/17/14  16    2/20/14: Decision to take break from chemo for two months, followed by CT and CA-125.    4/21/14  27  4/21/14 CT C/A/P Impression:    1. Increased size of 2 low-attenuation lymph nodes anterior to the right psoas muscle is concerning for worsening metastatic ovarian cancer.    2. New circumferential thickening of a 3.8 cm length segment of distal transverse colon is likely physiologic. Recommend attention on followup imaging.    3. Grossly unchanged size of clustered small nodules versus scarring in the right lower lobe the lungs.    4. Stable thrombosis of the right ovarian vein.  5/14/14: Diagnostic laparoscopy converted to exploratory laparotomy and removal of mesenteric masses, tumor debulking, peritoneal biopsies and intraperitoneal port placement. On laparoscopy, it was noted that there were small nodules on the anterior abdominal wall near the previous incision, small nodules on the right pelvic sidewall as well. Nodules were palpated in the mesentery; however, as it was unable to clarify where the origin of the nodules  "was, the decision was made to open the patient. On opening there was found to be approximately a 3 cm nodule in the small bowel mesentery and another separate approximately 2 cm nodule in the bowel mesentery. Pelvis without evidence of cancer, some mesenteric lymph nodes were palpated. No evidence otherwise of any disseminated cancer throughout the abdomen.    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 (2/2)  -Largest metastasis: 1.3 cm  -See comment  D: Peritoneum, right paracolic gutter #2, biopsy:  -Fibroadipose tissue with granulomatous inflammation surrounding refractile material  -Negative for malignancy   E: Small bowel adhesion, biopsy:  -Fibroconnective tissue, consistent with adhesion  -Negative for malignancy  F: Lymph nodes, mesentry, not otherwise specified, excision:  -Two lymph nodes, negative for metastatic carcinoma (0/2)  G: Lymph node, mesentery, \"#2\", excision:  -One lymph node, negative for metastatic carcinoma (0/1)  H: Lymph nodes, mesentery, \"nodule #2\", excision:  -Five lymph nodes, negative for metastatic carcinoma (0/5)  COMMENT:  Some of the specimens show post-operative changes. Others show possible treatment related changes, including necrosis. The metastatic carcinoma in the mesenteric lymph nodes (specimen C) shows variable morphology, including relatively low grade tumor with papillary architecture, and high grade tumor comprised of nests of tumor cells with irregular, slit-like spaces and marked nuclear pleomorphism.    5/29/14: Cycle 1 IV PACLitaxel / IP CISplatin / IP PACLitaxel.  - 28.  6/26/14: Cycle #2 IV/IP.  10  8/5/14: " CT chest/abd/pelvis IMPRESSION     1. In this patient with ovarian cancer, overall findings are indicative of stable/slight improvement, as multiple mesenteric lymphadenopathy and scattered nodular peritoneal soft tissue mass lesions appear unchanged or slightly smaller since 4/21/2014.    2. 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 Mild dilatation of the second and the third portion of the duodenum with a narrow SMA angle. This could represent SMA syndrome, if clinically correlated.17/14:    Cycle #3 IV/IP.  10.    CT chest/abd/pelvis with contrast on 8/5/14    Impression:    1. In this patient with ovarian cancer, overall findings are indicative of stable/slight improvement, as multiple mesenteric lymphadenopathy and scattered nodular peritoneal soft tissue mass lesions appear unchanged or slightly smaller since 4/21/2014.    2. 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 Mild dilatation of the second and the third portion of the duodenum with a narrow SMA angle. This could represent SMA syndrome, if clinically correlated.  8/7/14: Cycle #4 Taxol/Carbo (changed from IV/IP).  10. She has been feeling okay. She is unsure if she can finish out the course of 6 cycles IV/IP taxol/cisplatin. She feels like she has the flu for about a week then starts feeling gradually better after each chemo cycle. Her spouse notes that she actually has been more sick with the treatments than she initially admits here. She was also previously having some rib pain. Denies any rib pain now. Denies any chest pain or shortness of breath.  Plan: discussed recent CT cap results and switching to just IV as she is feeling miserable with IP treatments. Switch to IV carbo/taxol as patient is platinum sensitive.  We also discussed her taking part of the tesaro trial, which would require BRCA testing. She would like to take part in this trial if eligible.  8/20/14: Remove Intraperitoneal Port ( Port  and catheter intact - discarded)  8/28/14: Cycle #5 Taxol/Carbo held due to thrombocytopenia.  6.    She denies any vaginal bleeding, no changes in her bowel or bladder habits, no nausea/emesis, no lower extremity edema, and no difficulties eating or sleeping. She denies any abdominal discomfort/bloating, no fevers or chills, and no chest pain or shortness of breath. She states her diarrhea is the same. She reports some fatigue which improves about 1-2 weeks after her chemotherapy. She states she does not need any medication refills and she was told she does not meet the criteria for the TESARO trial. She states she has 3 bags of iv fluids left over from her previous chemotherapy and will give these to herself. She states she is ready for her treatment today.    9/29/14: Cycle #6 Taxol/Carbo  6. Insurance questions regarding GSF coverage today. No concerns other than fatigue. Taking iron for anemia and does not desire blood transfusion. Using neulasta for neutropenia. Using home IV hydration if needed. Baseline unchanged. No abdominal bloating, constipation, diarrhea, pain, vaginal or rectal bleeding, cough or dyspnea, fluid retention.    10/16/14: Impression:    1. Nodular peritoneal soft tissue mass in the right lower quadrant adjacent to the psoas muscle is no longer appreciated. Adjacent prominent lymphadenopathy is unchanged from previous exam. No new peritoneal lesions.    2. 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     10/20/14:  5. CT chest/abdomen/pelvis on 10/16/14 showed nodular peritoneal soft tissue mass in the right lower quadrant adjacent to the psoas muscle is no longer appreciated. Adjacent prominent lymphadenopathy is unchanged from previous exam. 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  1/27/15:  6.  4/28/15:  14.  5/26/15:  18.  6/2/15: CT cap Impression:  1. Postsurgical changes of hysterectomy and  bilateral salpingo-oophorectomy for ovarian cancer. There is a new 8 mm hazy, ill-defined hypoattenuating lesion in hepatic segment 6 which is suspicious for a metastatic deposit. Further evaluation with ultrasound in recommended.    2. Increased size of a left retroperitoneal lymph node which is indeterminate but may represent a ciro metastasis. Mildly prominent lymph nodes in the right lower quadrant are not significantly changed.  3. Moderate colonic stool burden.    6/4/15: US abdomen IMPRESSION:    Hyperechoic lesion in the right hepatic lobe, consistent with hemangioma. This does not corresponds to the area of the lesion seen on CT from 6/2/2015. An MR would be helpful for identifying and characterizing the lesion from the recent CT.  6/12/15: MR abd IMPRESSION:  1. New 20 x 11 mm enhancing lesions between the right obliques, concerning for metastatic disease. This lesions should be amenable to percutaneous biopsy, if indicated.  2. Correlating to the lesion visualized on comparison CT is a hepatic segment 6 subcapsular 7 mm lesion. Overall the appearance favors the diagnosis of a simple cyst. However, there is faint suggestion of mild peripheral arterial enhancement. Although this is favored as  artifactual, this should be followed up to confirm stability. Recommend 6 month followup.  3. Hepatic segment 6, 5 mm lesion too small to technically characterize. Differential would favor FNH, less likely flash filling hemangioma. Recommend attention on followup.  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      9/1/15: Cycle #1 Avastin/Cytoxan.   31.      9/24/15:feeling generally well. She says she has been having back and stomach spasms. She is taking cytosine daily (she ran out yesterday). She also says its affecting her voice. Admits that it burns occasionally. She is eating  and drinking normal. She also admit diarrhea, 5-7 times daily, lose/watery. She trying to stay hydrated and eat fiber. She also says that her body is sore, especially the bottom of her feet. Her blood pressure is normal. She also admits having headache after her first infusion.       9/24/15: Cycle #2 Avastin/Cytoxan.  19.  10/15/15: Cycle #3 Avastin/Cytoxan.  16.      11/6/15: CT c/a/p IMPRESSION:    1. Stable postoperative change of MAR/BSO for ovarian cancer.  2. The lesion in the right flank abdominal musculature is slightly decreased in size. Otherwise, stable examination.  2. No evidence of metastatic disease in the chest.     11/20/15: Treatment planning visit,  16     11/25/15: surgical pathology report  FINAL DIAGNOSIS:  Soft tissue, right oblique muscle mass, excision:  -Recurrent ovarian serous carcinoma  -Carcinoma is present less than 1 mm from one resection margin  -Background skeletal muscle and fibroadipose tissue     1/4/16:  22  1/11/16-1/27/16: Radiation to right flank x 12 treatments  4/7/2016:  94. CT cap IMPRESSION:    In this patient with ovarian cancer status post MAR/BSO and descending/transverse colectomy:  1. No evidence for malignancy in the chest, abdomen, or pelvis.  2. Stable small hypodense segment 6 liver lesion, appears more likely benign, possibly a cyst.  5/13/16:  124.  6/3/16: PET CT IMPRESSION: In this patient with a history of ovarian cancer:  1. Hypermetabolic and enlarging periaortic and perihepatic lymphadenopathy compatible with metastatic disease, as detailed above.  2. Although hypodense lesion in hepatic segment 6 has been present since 6/2/2015 associated hypermetabolism makes this lesion highly concerning for metastatic disease.     Plan: to start Niraparib under TESARO study.      6/9/16:  137.  6/14/16: Cycle #1 Niraparib.    6/28/16: Cycle #1 D15 Niraparib.    7/5/16:  100.    7/11/16: Cycle #2 Nraparib.    83.     8/3/2016: PET CT IMPRESSION:    In this patient with known history of ovarian cancer:  1) New pleural based nodular opacities in the lateral and inferior aspects of the bilateral lower lobes, worse in the left lung. Likely infection. Close follow up is recommended.      2) Slight decrease in hypermetabolic abdominal lymphadenopathy. 2 hypermetabolic lymph nodes persist.  3) Unchanged right hepatic lobe metastatic lesion.      8/9/16: Cycle #3 Niraparib.  69.  9/6/16: Cycle #4 Niraparib.  53. Dose held due to anemia.  9/13/16: Eval for potential cycle 4 niraparib. Dose held due to anemia.  10/4/16: CT CAP impression:  IMPRESSION: In this patient with a known history of ovarian cancer:  1. There has been interval resolution of pleural-based nodular  opacities which likely represented infection.  2. Abdominal lymphadenopathy in the form of 2 portacaval lymph nodes  have not significantly changed in size, noted to be hypermetabolic on  prior PET/CT.  3. Previously demonstrated metastatic lesion in the right lobe of the  liver is not significantly changed.  10/11/16:  60  11/1/16: C6 niraparib,  75  11/29/16: C7 niraparib.  78. CT CAP impression as follows:  Target lesions (RECIST criteria):       A previously described target lesion superior to the head of the  pancreas (series 2, image 64)  (referred to as a perihepatic node on  6/3/2016) may not be a valid target lesion because it measured less  than 1.5 cm originally. However, this particular node has decreased in  size, now measuring 7 mm in short axis versus 14 mm on 6/3/2016 when  measured in a similar fashion.       2.3 cm short axis portacaval lymph node on series 2 image 67,  previously 2.0 cm on 10/4/2016.       1.2 cm subtle hypodensity in hepatic segment 6 on series 2 image 75,  stable on multiple studies since at least 6/3/2016     Sum of diameters today: 3.5 cm. Sum of diameters 10/4/2016: 3.2 cm.  Growth = 9%.     12/27/16: C8 niraparib.  105.  1/25/17: C9 niraparib.  108.  2/23/17: C10 niraparib.  132.   CT CAP impression:  Sum of target lesion diameters today: 3.7 cm. Sum of target lesion  diameters on 11/28/2016: 3.5 cm. Growth= 6%  1. In this patient with history of ovarian cancer there is stable  disease by RECIST criteria as evidenced by:   1a. Mildly increased size of liver metastasis.  1b. Stable portacaval lymphadenopathy.  1c. No evidence of metastatic disease in the chest.  2. Trace emphysematous changes of the lungs.  3/22/17: C11 niraparib.  132.  4/19/17: C12 niraparib.  127.  5/16/17: CT CAP IMPRESSION: In this patient with ovarian cancer:  1. Mildly increased size of hepatic metastasis segment 6 with subtle increased capsular retraction.  2. Stable edmundo hepatis nodes, with mild increase in size of periaortic lymph nodes.  3. Prominent left supraclavicular lymph node with subtle increase in size compared to prior studies, particularly comparing to 10/4/2016.  4. Mild subtle groundglass opacities in the right upper lobe, not present on prior study, lesser extent in the right lower lobe.  Findings may represent infection, additional consideration is malignancy (less likely), and attention on follow-up study  Recommended.  Addendum:   Prominent left supraclavicular lymph node (3/25) is stable from most recent CT performed 2/20/2017, currently measuring 14 x 16 mm, previously 14 x 16 mm on 2/20/2017.      The portal caval lymph node/edmundo hepatis lymph node is stable from 2/20/2017, measuring 21 mm.      Clarification of size of the para-aortic lymph node (series 3 image 327). It short axis measurement is 11 mm versus 9 mm on prior study.      Hepatic segment 6 triangular-shaped low density lesion (3/362) measures 14 mm, previously measured 12 mm, demonstrating a  possible/questionable minimal subtle increase in size. Similarly the lymph nodes noted above in the supraclavicular and  para-aortic regions demonstrate possible minimal possible subtle increase in size.      5/18/17: Cycle #13 Niraparib.  191.  6/15/17: Cycle #14 Niraparib.  146.  7/13/17: Cycle #15 Niraparib 200 mg.  171     CT (8/9/17):       IMPRESSION:  1. Segment 6 hepatic metastasis is stable to minimally increased in size.  2. Slight increase in multicentric adenopathy, most pronounced at the edmundo hepatis. Additional sites include the inferior left neck/supraclavicular region and retroperitoneum which appear stable to  minimally increased.      8/10/17:  175  9/14/17: MUGA LVEF 54%  9/22/17: C1D1 carboplatin/Doxil.  187.  10/19/17: C2D1 carboplatin/Doxil.  108.  11/17/17: C3D1 carboplatin/Doxil.  82.  12/14/17: C4D1 carboplatin/Doxil/avastin.  83.  Of note, avastin held on C4D14  1/12/18: C5D1 carboplatin/Doxil/avastin.  80.  1/26/18: platelets 61, patient was not given avastin due to being jehova's witness.   2/9/18: C6D1 carboplatin/Doxil/Avastin.   71.  3/2/18:  49. Three month treatment break.  6/20/2018:  170. CT CAP:  IMPRESSION: In this patient with history of ovarian cancer:  1. Increased size of a right hepatic lobe lesion and numerous edmundo hepatis and retroperitoneal lymph nodes compatible with progression of metastatic disease.  2. New mild intrahepatic biliary ductal dilatation, periportal edema, and pericholecystic fluid. Increased soft tissue fullness in the edmundo hepatis in the expected location of the common hepatic duct. Findings  are suspicious for developing biliary ductal obstruction secondary to worsening metastatic disease in the edmundo hepatis. Correlation with liver function tests is recommended. Right upper quadrant ultrasound  may be beneficial.  3. Unchanged left supraclavicular and right hilar lymphadenopathy in the chest.      8/3/18: C1D1 weekly paclitaxel.  not done.  9/20/18: C2D1 weekly paclitaxel 80mg/m2. Ca  125-56  11/8/2018: C3D1 weekly paclitaxel;  26     12/17/18: Ct cap IMPRESSION:  1. New area of lobulated hypodense nodularity at the far-inferior right liver. This raises the possibility of a new area of hepatic metastasis.  2. Conversely, other nodules within the right liver are smaller suggesting improvement.  3. Improved adenopathy at the abdominal retroperitoneum. Improved left  supraclavicular adenopathy. Stable mildly prominent right hilar lymph nodes.  4. Previously noted ill-defined soft tissue at the edmundo hepatis region is still present but appears less prominent in size.  5. New finding of segmental wall thickening of the proximal sigmoid colon with some fluid distention of the adjacent colon. This could represent a segmental colitis. Other etiologies not excluded.     12/20/18:   19.  1/22/19:  45.  3/20/19: CT cap IMPRESSION:  1. Progression of disease with increasing size of a right inferior hepatic mass consistent with metastatic disease.  2. Increasing ill-defined multifocal regions of soft tissue at the edmundo hepatis consistent with malignant adenopathy versus malignant implants. Associated increased intrahepatic biliary ductal dilatation.  3. Other areas of progressive adenopathy noted at the left neck base, mediastinum, and abdominal retroperitoneum.  4. New area of carcinomatosis medial to stomach at the left upper abdomen.     3/20/19: Cycle #1 weekly paclitaxel.   180.  5/7/19: Cycle #2 weekly paclitaxel.    142.  6/20/19: Cycle #3 weekly paclitaxel/  259.     7/11/19: CT CAP-IMPRESSION:  1. Slight increased size of the left supraclavicular lymph node.  Slight progression of the mediastinal lymph nodes is also evident. New  periesophageal node as described above. Retroperitoneal nodes are  stable if not slightly smaller. Some of these nodes appear to have  partially calcified suggesting a response to interval therapy.  2. Interval decreased size of the  inferior right hepatic lobe lesion  now with an area of central calcification or enhancement. No new liver  lesions. Remaining abdominal organs are grossly unremarkable. No  significant hydronephrosis.  3. Postop changes involving the bowel and pelvic region. No recurrent  pelvic mass.     8/8/19: C1 gemcitabine 800mg/m2 IV days 1&8.  446.    8/23/19: Endoscopic Retrograde Cholangiopancreatogram with 4 mm Hurricane Balloon Dilation, gallbladder stent and Bile Duct stent placements, biliary Sphincterotomy       8/29/19: C2 gemcitabine 800mg/m2 IV days 1&8.  548.    Past Medical History:   Diagnosis Date     Antiplatelet or antithrombotic long-term use      Ascites      Blood clot in the legs      Diabetes (H)      Ovarian cancer (H)     serous,stg IV     Pleural effusion      Pulmonary embolism (H) 2/2012     Refusal of blood transfusions as patient is [facility]      Short gut syndrome      Subclinical hypothyroidism 4/18/2013     Thrombosis of leg        Past Surgical History:   Procedure Laterality Date     COLECTOMY       COLONOSCOPY  2/1/2012    Procedure:COLONOSCOPY; With Biopsy; Surgeon:TONI SULLIVAN; Location:[facility] OR     CYSTOSCOPY, RETROGRADES, INSERT STENT URETER(S), COMBINED Right 10/4/2019    Procedure: CYSTOSCOPY, WITH RETROGRADE PYELOGRAM AND URETERAL STENT INSERTION;  Surgeon: Wolf Gan MD;  [facility]     ENDOSCOPIC RETROGRADE CHOLANGIOPANCREATOGRAM N/A 8/23/2019    Procedure: Endoscopic Retrograde Cholangiopancreatogram with 4 mm Hurricane Balloon Dilation, gallbladder stent and Bile Duct stent placements, Bilarry Sphincterotomy ;  Surgeon: Catrachito Lloyd MD;  Location: [facility] OR     HYSTERECTOMY TOTAL ABD, RHIANNA SALPINGO-OOPHORECTOMY, NODE DISSECTION, TUMOR DEBULKING, COMBINED  2/1/2012    Procedure:COMBINED HYSTERECTOMY TOTAL ABDOMINAL, BILATERAL SALPINGO-OOPHORECTOMY, NODE DISSECTION, TUMOR DEBULKING;  Exploratory Laparotomy, Total Abdominal Hysterectomy, Bilateral  Salpingo-Oophorectomy, appendectomy,lysis of adhesions, ileal, ascending, transverse and splenic flexure resection, ileal descending bowel renanastomosis, incidental cystotomy repair, CUSA procedure and colonoscopy ; [facility]     INSERT PORT PERITONEAL ACCESS  4/3/2012    Procedure:INSERT PORT PERITONEAL ACCESS; Intraperitoneal Port Placement (c-arm); Surgeon:SAMUEL CARRASCO; Location:[facility]     INSERT PORT PERITONEAL ACCESS  5/14/2014    Procedure: INSERT PORT PERITONEAL ACCESS;  Surgeon: Nga Yeung MD;  Location: [facility] OR     INSERT PORT VASCULAR ACCESS       IR PORT REMOVAL RIGHT  9/20/2019     LAPAROSCOPY DIAGNOSTIC (GYN)  5/14/2014    Procedure: LAPAROSCOPY DIAGNOSTIC (GYN);  Surgeon: Nga Yeung MD;  Location: [facility]     LAPAROTOMY EXPLORATORY Right 11/25/2015    Procedure: LAPAROTOMY EXPLORATORY;  Surgeon: Nga Yeung MD;  Location: [facility]     LAPAROTOMY, TUMOR DEBULKING, COMBINED  5/14/2014    Procedure: COMBINED LAPAROTOMY, TUMOR DEBULKING;  Surgeon: Nga Yeung MD;  Location: [facility]     OPTICAL TRACKING SYSTEM CRANIOTOMY, EXCISE TUMOR, COMBINED Right 9/18/2019    Procedure: Stealth Assisted Right Craniotomy And Tumor Resection;  Surgeon: Bertin Dewey MD;  Location: [facility]     PICC INSERTION Left 09/20/2019    5Fr - 46cm (4cm external), basilic vein, low SVC     REMOVE CATHETER PERITONEAL N/A 8/20/2014    Procedure: REMOVE CATHETER PERITONEAL;  Surgeon: Nga Yeung MD;  Location: [facility]     VASCULAR SURGERY      stent left iliac vein       Current Outpatient Medications   Medication     albuterol (PROAIR HFA/PROVENTIL HFA/VENTOLIN HFA) 108 (90 Base) MCG/ACT inhaler     amoxicillin-clavulanate (AUGMENTIN) 875-125 MG tablet     Ascorbic Acid (VITAMIN C PO)     Calcium Carbonate-Vitamin D (CALCIUM + D PO)     clotrimazole 10 MG shane     cyanocobalamin (VITAMIN B12) 1000 MCG/ML injection     diphenoxylate-atropine (LOMOTIL) 2.5-0.025 MG per tablet      dronabinol (MARINOL) 2.5 MG capsule     Ferrous Sulfate 324 (65 Fe) MG TBEC     HEMP OIL OR EXTRACT OR OTHER CBD CANNABINOID, NOT MEDICAL CANNABIS,     HERBALS     ipratropium - albuterol 0.5 mg/2.5 mg/3 mL (DUONEB) 0.5-2.5 (3) MG/3ML neb solution     LEVOTHYROXINE SODIUM PO     loperamide (IMODIUM) 2 MG capsule     LORazepam (ATIVAN) 1 MG tablet     magnesium oxide (MAG-OX) 400 MG tablet     medical cannabis (Patient's own supply)     morphine (MS CONTIN) 15 MG CR tablet     naloxone (NARCAN) 4 MG/0.1ML nasal spray     ondansetron (ZOFRAN-ODT) 4 MG ODT tab     order for DME     oxyCODONE (ROXICODONE) 5 MG tablet     pantoprazole (PROTONIX) 40 MG EC tablet     polyethylene glycol (MIRALAX/GLYCOLAX) packet     potassium chloride (KLOR-CON) 20 MEQ packet     prochlorperazine (COMPAZINE) 10 MG tablet     tamsulosin (FLOMAX) 0.4 MG capsule     VITAMIN E NATURAL PO     dexamethasone (DECADRON) 2 MG tablet     No current facility-administered medications for this visit.        No Known Allergies    Family History   Problem Relation Age of Onset     Cancer Mother 69        lung, smoker     Cancer Maternal Uncle 65        brain     Colon Cancer Maternal Aunt 80        colon       Social History     Socioeconomic History     Marital status:      Spouse name: Not on file     Number of children: Not on file     Years of education: Not on file     Highest education level: Not on file   Occupational History     Not on file   Social Needs     Financial resource strain: Not on file     Food insecurity:     Worry: Not on file     Inability: Not on file     Transportation needs:     Medical: Not on file     Non-medical: Not on file   Tobacco Use     Smoking status: Former Smoker     Packs/day: 0.00     Years: 8.00     Pack years: 0.00     Types: Cigarettes     Last attempt to quit: 1980     Years since quittin.3     Smokeless tobacco: Never Used     Tobacco comment: started at 11 yo and quit at 16 yo   Substance and  "Sexual Activity     Alcohol use: Yes     Comment: 3x/day wine or jodi     Drug use: No     Sexual activity: Not on file   Lifestyle     Physical activity:     Days per week: Not on file     Minutes per session: Not on file     Stress: Not on file   Relationships     Social connections:     Talks on phone: Not on file     Gets together: Not on file     Attends Christianity service: Not on file     Active member of club or organization: Not on file     Attends meetings of clubs or organizations: Not on file     Relationship status: Not on file     Intimate partner violence:     Fear of current or ex partner: Not on file     Emotionally abused: Not on file     Physically abused: Not on file     Forced sexual activity: Not on file   Other Topics Concern     Parent/sibling w/ CABG, MI or angioplasty before 65F 55M? Not Asked   Social History Narrative     Not on file           ROS  12 point ROS reviewed and negative except as listed in HPI      Physical Exam:    /68   Pulse 106   Temp 99.5  F (37.5  C) (Tympanic)   Resp 16   Ht 1.651 m (5' 5\")   Wt 53.8 kg (118 lb 8 oz)   SpO2 95%   BMI 19.72 kg/m      CONSTITUTIONAL: Chronically ill but non-toxic appearing female, appears fatigued and thin  HEAD: Normocephalic, atraumatic; temporal wasting  EYES: PERRLA; no scleral icterus, EOMI  ENT: Oropharynx pink without lesions, thick white coating to tongue  NECK: Neck supple, firm fixed left supraclavicular node roughly 4cm x 4cm, non-tender without erythema or edema, not eroding through skin  RESPIRATORY: CAMMY, LLL and RUL clear to auscultation, RML and RLL with diminished breath sounds, respirations unlabored   CV: Tachycardic, S1S2, no clicks, murmurs, rubs, or gallops; bilateral lower extremities without edema, dorsalis pedis pulses 2+ bilaterally  GASTROINTESTINAL: Normoactive bowel sounds x4 quadrants, abdomen soft, slightly distended, and non-tender to palpation without masses or organomegaly, no rebound " tenderness, guarding, or rigidity  GENITOURINARY: Not indicated  MUSCULOSKELETAL: Moves all extremities; BUE 5/5 strength, RLE plantarflexion 4/5, RLE dorsiflexion 3/5, BLE plantarflexion and dorsiflexion 5/5  NEUROLOGIC: CN II-XII intact, gait not assessed  SKIN: Appropriate color for race, warm and dry, no rashes or lesions to unclothed skin  PSYCHIATRIC: Pleasant and interactive, affect bright, makes appropriate eye contact, thought process linear but she does not seem to have good recall of how she has been feeling lately- does not remember the day she had nausea/vomiting    Labs:      10/17/2019  Day 1 (Planned)   Hemoglobin 11.7 - 15.7 g/dL 11.8   Hematocrit 35.0 - 47.0 % 36.3   Platelet Count 150 - 450 10e9/L 412   Absolute Neutrophil 1.6 - 8.3 10e9/L 5.8   Sodium 133 - 144 mmol/L 126 (A)   Potassium 3.4 - 5.3 mmol/L 3.0 (A)   Chloride 94 - 109 mmol/L 92 (A)   Carbon Dioxide 20 - 32 mmol/L 27   Urea Nitrogen 7 - 30 mg/dL 7   Creatinine 0.52 - 1.04 mg/dL 0.58   Calcium 8.5 - 10.1 mg/dL 8.1 (A)   Magnesium 1.6 - 2.3 mg/dL 1.0 (A)   Bilirubin Total 0.2 - 1.3 mg/dL 1.3   ALT 0 - 50 U/L 44   AST 0 - 45 U/L 40   Alkaline Phosphatase 40 - 150 U/L 970 (A)  Specimen run with a dilution   Albumin 3.4 - 5.0 g/dL 2.0 (A)   Protein Total 6.8 - 8.8 g/dL 6.6 (A)   WBC 4.0 - 11.0 10e9/L 8.3    pending    Assessment/Plan:  1) Recurrent ovarian cancer: Overall clinical picture is concerning for failure to thrive- multiple electrolyte abnormalities, hypoalbuminemia, suspect dehydration as well. Will start IV fluids and replace magnesium and potassium in clinic, however, she will be admitted to observation for further monitoring and management of her chronic hyponatremia given the moderate degree, recent brain surgery and brain metastasis radiation, and reports of R foot drop, nausea/vomiting, weakness, and changes in short term memory. Report given to Beth Lynch CNP with inpatient gynecologic oncology team.  2)  Diarrhea: Potentially due to Augmentin and scheduling Miralax, however, given her recent hospitalization and antibiotic therapy as well as abdominal discomfort will obtain stool for C diff sample.  3) Dysuria: Recent ureteral stent placement- obtain UA/UC to eval for infection  4) GROVES: Likely multifactorial and related to deconditioning and pleural effusions. Recent imaging demonstrated pleural effusions and she has diminished RML and RLL breath sounds. We discussed thoracentesis and she is amenable to this.   5) Chemo: Not suitable for carboplatin infusion today given significant electrolyte abnormalities and acute concerns. Defer carboplatin until resolution of acute issues.      >40 minutes of face to face time were spent with the patient with over 50% of that time spent in counseling, coordination of care, education, and symptom management.    HAILE De Paz, FNP-C  Division of Gynecologic Oncology  [facility]  Pager: [phone]

## 2019-10-17 NOTE — PLAN OF CARE
HD1 admitted with diarrhea and electrolyte imbalances. A&Ox4, tachy in the 100s on room air, denies pain. PIV is SL, K+ was 3.0, replaced in clinic, Mg++ 1.0, replaced in clinic. Awaiting orders.

## 2019-10-17 NOTE — PROGRESS NOTES
Nursing Note:  Odalys Nathan presents today for PIV start and labs.    Patient seen by provider today: Yes: ELIZABETH De Paz   present during visit today: Not Applicable.    Note: N/A.    Intravenous Access:  Labs drawn without difficulty.  Peripheral IV placed.    Discharge Plan:   Patient was sent to [facility] for provider/infusion appointment.    Loree Ambriz, RN, RN

## 2019-10-17 NOTE — H&P
[facility] History and Physical    Odalys Nathan MRN# [id number]   Age: 59 year old YOB: 1960     Date of Admission:  10/17/2019    Primary care provider: Aubrie Hester             Chief Complaint:   Recurrent ovarian cancer with mets to the liver, brain, retroperitoneal and mesenteric adenopathy   Electrolyte abnormalities   Severe malnutrition  Failure to thrive         History of Present Illness:   This patient is a 59 year old female with recurrent ovarian cancer who was admitted from the clinic for electrolyte abnormalities, weakness, and poor PO intake. She recently underwent surgical resection of a brain metastasis followed by gamma knife radiation, was hospitalized for Klebsiella bacteremia of unknown source likely related to biliary stent (now on Augmentin until ERCP), and had a ureteral stent placed.     She presented to clinic today for consideration of cycle one single agent carboplatin. She states she is mostly at her baseline other than worsening GROVES that has developed slowly over time. Now only able to walk across room before needing to sit down. Uses nebulizer TID which helps with dyspnea. Denies SOB at rest or cough. States she is also similar to baseline in terms of PO intake, drinking a few glasses of water per day and drank a boost shake. She becomes hungry but food doesn't taste good, only tastes like salt or sugar. She was recently prescribed medical marijuana to help with hunger, has not tried it yet. Is not currently nauseous or vomiting but was vomiting for 3d one week ago. She is having stools similar to baseline of 7 years, takes Miralax BID to prevent constipation. Has 2-3 watery stools per day. When she does not have watery stools and stops Miralax she quickly becomes constipated, which causes her significant pain and takes 1-2 days to resolve. She has had right foot drop the past couple of weeks but she reports this resolved a week ago. She still has  problems with R dorsiflexion. Denies severe headaches, changes in her vision, gait disturbance, weakness, slurred speech, changes in sensation. She has had some dark discoloration of urine lately that she was told was expected following stent placement.     She is seen with her  who endorses significant memory loss, which he tries to come to appointments for to keep up with her treatment.          Cancer Treatment History:   1/18/2012 - Admitted to hospital for 2 weeks of intermittent abdominal cramping, distention, diarrhea and N/V. CT of abdomen/pelvis significant for small bowel obstruction, a heterogenous soft tissue density in the pelvis, omental nodules, and ascites. Bilateral adnexal masses per U/S of pelvis. CA-125 was elevated at 987, CEA was normal at 1.0.    1/18/12 - Therapeutic paracentesis (4 L) with cytology confirming malignancy (NH positive, weak ER positive, CK-7 positive consistent with GYN primary). Surgery recommended d/t potential for falling blood counts 2/2 chemotherapy (patient is [employer] and limiting blood transfusion)    2/1/12 - Exploratory laparotomy, MAR BSO, lysis of adhesion, Appendectomy, Repair cystotomy, Omentectomy Vqef-lqrqcb-sjjzrbcsa-transverse-colon resection, Ileo-descending colon anastomosis, CUSA, & Colonoscopy (done by Dr. Amato and colorectal team).  2/20/2012 - Admitted to hospital for bilateral pulmonary emboli and drainage of pleural effusion. Started on Lovenox.    2/28/12-3/21/12: Cycle #1-2 Carbo/Taxol IV   4/11/12-6/14/12: Cycle #3-6 IV chemo, Cycle #1 IV/IP chemo    7/16/12 -  8, CT PRADEEP - enrolled in  - observation arm  3/4/13-6/28/13:  6, 9, 12, 15, 20.  7/1/13: CT Chest, Abdomen, Pelvis IMPRESSION:  1. Worsening metastatic ovarian carcinoma suggested by increased size of soft tissue nodules anterior to the right psoas muscle, that may represent growing mesenteric lymphadenopathy.  2. Remaining prominent right lower quadrant  mesenteric lymph nodes are not significantly changed.  3. Clustered nodular opacities in the right lower lobe are not significant change from 7/16/2012 and remain indeterminate. Again, the appearance and distribution is suggestive of an infectious etiology.  4. Stable 8 mm soft tissue nodule in left breast, unchanged. This can be observed on followup studies, but correlation with mammography could be considered.    7/11/13: The left ventricular ejection fraction is normal at 66.4%.  7/12/13-9/6/13: Cycle #1-3 Doxil/Carbo.  10, 11.    9/30/13 CT C/A/P Impression:  1. Overall, favorable response to treatment with decreasing size of soft tissue nodules tracking along the anterior aspect of the right psoas muscle.  2. 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  3. Improved cluster of right lower lobe pulmonary nodular opacities. These may represent resolving infection.  10/3/13-12/9/13:  9, 8, 10. Cycle 4-6 Doxil/Carbo.      1/13/14 CT C/A/P Impression:   1. Stable appearance of metastatic ovarian cancer. Scattered soft tissue nodules along the anterior aspect of the right psoas muscle are unchanged in size. Mild mesenteric lymphadenopathy is unchanged.  2. Clustered micronodules in the right lower lobe are unchanged from 9/30/2013, but improved from 7/1/2013. This history suggests a postinflammatory/postinfectious etiology.  3. 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  1/16/14 Discussed multiple options for her based on relatively stable-appearing disease on CT: chemo break with recheck of  in 1 month vs starting new chemo agent immediately vs exploratory surgery with possible resection of nodules.   2/17/14  16    2/20/14: Decision to take break from chemo for two months, followed by CT and CA-125.    4/21/14  27    4/21/14 CT C/A/P Impression:    1. Increased size of 2 low-attenuation lymph nodes anterior to the right psoas muscle is concerning for worsening metastatic  "ovarian cancer.    2. New circumferential thickening of a 3.8 cm length segment of distal transverse colon is likely physiologic. Recommend attention on followup imaging.    3. Grossly unchanged size of clustered small nodules versus scarring in the right lower lobe the lungs.    4. Stable thrombosis of the right ovarian vein.    5/14/14: Diagnostic laparoscopy converted to exploratory laparotomy and removal of mesenteric masses, tumor debulking, peritoneal biopsies and intraperitoneal port placement.   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 (2/2)  -Largest metastasis: 1.3 cm  -See comment  D: Peritoneum, right paracolic gutter #2, biopsy:  -Fibroadipose tissue with granulomatous inflammation surrounding refractile material  -Negative for malignancy   E: Small bowel adhesion, biopsy:  -Fibroconnective tissue, consistent with adhesion  -Negative for malignancy  F: Lymph nodes, mesentry, not otherwise specified, excision:  -Two lymph nodes, negative for metastatic carcinoma (0/2)  G: Lymph node, mesentery, \"#2\", excision:  -One lymph node, negative for metastatic carcinoma (0/1)  H: Lymph nodes, mesentery, \"nodule #2\", excision:  -Five lymph nodes, negative for metastatic carcinoma (0/5)     5/29/14: Cycle 1 IV PACLitaxel / IP CISplatin / IP PACLitaxel.  - 28.  6/26/14: Cycle #2 IV/IP.  10  8/5/14: CT chest/abd/pelvis IMPRESSION     1. In this patient with ovarian cancer, overall findings are indicative of stable/slight improvement, as multiple mesenteric lymphadenopathy and scattered nodular peritoneal soft tissue mass lesions appear unchanged or slightly " smaller since 4/21/2014.    2. 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 Mild dilatation of the second and the third portion of the duodenum with a narrow SMA angle. This could represent SMA syndrome, if clinically correlated.17/14:    Cycle #3 IV/IP.  10.    8/7/14: Cycle #4 Taxol/Carbo (changed from IV/IP).  10. Switch to IV carbo/taxol as she is not tolerating IV/IP well.  8/20/14: Remove Intraperitoneal Port   8/28/14: Cycle #5 Taxol/Carbo held due to thrombocytopenia.  6.     9/29/14: Cycle #6 Taxol/Carbo  6.      10/16/14: CT Impression:    1. Nodular peritoneal soft tissue mass in the right lower quadrant adjacent to the psoas muscle is no longer appreciated. Adjacent prominent lymphadenopathy is unchanged from previous exam. No new peritoneal lesions.    2. 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     10/20/14:  5. CT chest/abdomen/pelvis on 10/16/14 showed nodular peritoneal soft tissue mass in the right lower quadrant adjacent to the psoas muscle is no longer appreciated. Adjacent prominent lymphadenopathy is unchanged from previous exam. 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  1/27/15:  6.  4/28/15:  14.  5/26/15:  18.    6/2/15: CT cap Impression:  1. Postsurgical changes of hysterectomy and bilateral salpingo-oophorectomy for ovarian cancer. New 8 mm hazy, ill-defined hypoattenuating lesion in hepatic segment 6 which is suspicious for a metastatic deposit.  2. Increased size of a left retroperitoneal lymph node which is indeterminate but may represent a ciro metastasis. Mildly prominent lymph nodes in the right lower quadrant are not significantly changed.    6/4/15: US abdomen IMPRESSION:    Hyperechoic lesion in the right hepatic lobe, consistent with hemangioma. This does not corresponds to the area of the lesion seen on CT from 6/2/2015. Recommend MR  6/12/15: MR abd  IMPRESSION:  1. New 20 x 11 mm enhancing lesions between the right obliques, concerning for metastatic disease. This lesions should be amenable to percutaneous biopsy, if indicated.  2. Correlating to the lesion visualized on comparison CT is a hepatic segment 6 subcapsular 7 mm lesion. Overall the appearance favors the diagnosis of a simple cyst. However, there is faint suggestion of mild peripheral arterial enhancement.   3. Hepatic segment 6, 5 mm lesion too small to technically characterize. Differential would favor FNH, less likely flash filling hemangioma. Recommend attention on followup.  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      9/1/15: Cycle #1 Avastin/Cytoxan.   31.   9/24/15: Cycle #2 Avastin/Cytoxan.  19.  10/15/15: Cycle #3 Avastin/Cytoxan.  16.   11/6/15: CT c/a/p IMPRESSION:    1. Stable postoperative change of MAR/BSO for ovarian cancer.  2. The lesion in the right flank abdominal musculature is slightly decreased in size. Otherwise, stable examination.  2. No evidence of metastatic disease in the chest.     11/25/15: surgical pathology report  FINAL DIAGNOSIS: Soft tissue, right oblique muscle mass, excision:  -Recurrent ovarian serous carcinoma  -Carcinoma is present less than 1 mm from one resection margin  -Background skeletal muscle and fibroadipose tissue     1/4/16:  22  1/11/16-1/27/16: Radiation to right flank x 12 treatments  4/7/2016:  94. CT cap IMPRESSION:    In this patient with ovarian cancer status post MAR/BSO and descending/transverse colectomy:  1. No evidence for malignancy in the chest, abdomen, or pelvis.  2. Stable small hypodense segment 6 liver lesion, appears more likely benign, possibly a cyst.  5/13/16:  124.  6/3/16: PET CT IMPRESSION: In this patient with a history of ovarian cancer:  1.  Hypermetabolic and enlarging periaortic and perihepatic lymphadenopathy compatible with metastatic disease, as detailed above.  2. Although hypodense lesion in hepatic segment 6 has been present since 6/2/2015 associated hypermetabolism makes this lesion highly concerning for metastatic disease.     6/9/19: to start Niraparib under TESARO study.  137.  6/14/16: Cycle #1 Niraparib.    7/11/16: Cycle #2 Nraparib.   83.  8/3/2016: PET CT IMPRESSION:    1) New pleural based nodular opacities in the lateral and inferior aspects of the bilateral lower lobes, worse in the left lung. Likely infection. Close follow up is recommended.      2) Slight decrease in hypermetabolic abdominal lymphadenopathy. 2 hypermetabolic lymph nodes persist.  3) Unchanged right hepatic lobe metastatic lesion.   8/9/16: Cycle #3 Niraparib.  69.  9/6/16 and 9/13/16: Eval for potential cycle 4 niraparib. Dose held due to anemia.  10/6/19: Cycle #5 Niraparib  10/4/16: CT CAP impression:  1. There has been interval resolution of pleural-based nodular opacities which likely represented infection.  2. Abdominal lymphadenopathy in the form of 2 portacaval lymph nodes have not significantly changed in size, noted to be hypermetabolic on prior PET/CT.  3. Previously demonstrated metastatic lesion in the right lobe of the liver is not significantly changed.  11/1/16: C6 niraparib,  75  11/29/16: C7 niraparib.  78. CT CAP impression as follows:  Target lesions (RECIST criteria): A previously described target lesion superior to the head of the pancreas (series 2, image 64)  (referred to as a perihepatic node on 6/3/2016) may not be a valid target lesion because it measured less than 1.5 cm originally. However, this particular node has decreased in size, now measuring 7 mm in short axis versus 14 mm on 6/3/2016 when measured in a similar fashion. 2.3 cm short axis portacaval lymph node on series 2 image 67, previously 2.0 cm on  10/4/2016. 1.2 cm subtle hypodensity in hepatic segment 6 on series 2 image 75, stable on multiple studies since at least 6/3/2016  Sum of diameters today: 3.5 cm. Sum of diameters 10/4/2016: 3.2 cm. Growth = 9%.    12/27/16: C8 niraparib.  105.  1/25/17: C9 niraparib.  108.  2/23/17: C10 niraparib.  132.   CT CAP impression: Sum of target lesion diameters today: 3.7 cm. Sum of target lesion diameters on 11/28/2016: 3.5 cm. Growth= 6%  1. In this patient with history of ovarian cancer there is stable disease by RECIST criteria as evidenced by:   1a. Mildly increased size of liver metastasis.  1b. Stable portacaval lymphadenopathy.  1c. No evidence of metastatic disease in the chest.  2. Trace emphysematous changes of the lungs.  3/22/17: C11 niraparib.  132.  4/19/17: C12 niraparib.  127.  5/16/17: CT CAP IMPRESSION: In this patient with ovarian cancer:  1. Mildly increased size of hepatic metastasis segment 6 with subtle increased capsular retraction.  2. Stable edmundo hepatis nodes, with mild increase in size of periaortic lymph nodes.  3. Prominent left supraclavicular lymph node with subtle increase in size compared to prior studies, particularly comparing to 10/4/2016.  4. Mild subtle groundglass opacities in the right upper lobe, not present on prior study, lesser extent in the right lower lobe. Findings may represent infection, additional consideration is malignancy (less likely), and attention on follow-up study  Addendum:   Prominent left supraclavicular lymph node (3/25) is stable from most recent CT performed 2/20/2017, currently measuring 14 x 16 mm, previously 14 x 16 mm on 2/20/2017. The portal caval lymph node/edmundo hepatis lymph node is stable from 2/20/2017, measuring 21 mm. Clarification of size of the para-aortic lymph node (series 3 image 327). It short axis measurement is 11 mm versus 9 mm on prior study. Hepatic segment 6 triangular-shaped low density lesion (3/362)  measures 14 mm, previously measured 12 mm, demonstrating a possible/questionable minimal subtle increase in size. Similarly the lymph nodes noted above in the supraclavicular and para-aortic regions demonstrate possible minimal possible subtle increase in size.  5/18/17: Cycle #13 Niraparib.  191.  6/15/17: Cycle #14 Niraparib.  146.  7/13/17: Cycle #15 Niraparib 200 mg.  171  8/9/17: CT IMPRESSION:  1. Segment 6 hepatic metastasis is stable to minimally increased in size.  2. Slight increase in multicentric adenopathy, most pronounced at the edmudno hepatis. Additional sites include the inferior left neck/supraclavicular region and retroperitoneum which appear stable to  minimally increased.      8/10/17:  175  9/14/17: MUGA LVEF 54%  9/22/17: C1D1 Carboplatin/Doxil.  187.  10/19/17: C2D1 carboplatin/Doxil.  108.  11/17/17: C3D1 carboplatin/Doxil.  82.  12/14/17: C4D1 carboplatin/Doxil/avastin.  83.  Of note, avastin held on C4D14  1/12/18: C5D1 carboplatin/Doxil/avastin.  80.  1/26/18: platelets 61, patient was not given avastin due to being jehova's witness.   2/9/18: C6D1 carboplatin/Doxil/Avastin.   71.  3/2/18:  49. Three month treatment break.  6/20/2018:  170. CT CAP:  IMPRESSION: In this patient with history of ovarian cancer:  1. Increased size of a right hepatic lobe lesion and numerous edmundo hepatis and retroperitoneal lymph nodes compatible with progression of metastatic disease.  2. New mild intrahepatic biliary ductal dilatation, periportal edema, and pericholecystic fluid. Increased soft tissue fullness in the edmundo hepatis in the expected location of the common hepatic duct. Findings  are suspicious for developing biliary ductal obstruction secondary to worsening metastatic disease in the edmundo hepatis. Correlation with liver function tests is recommended. Right upper quadrant ultrasound  may be beneficial.  3. Unchanged left  supraclavicular and right hilar lymphadenopathy in the chest.      8/3/18: C1D1 weekly paclitaxel.  not done.  9/20/18: C2D1 weekly paclitaxel 80mg/m2. Ca 125-56  11/8/2018: C3D1 weekly paclitaxel;  26  12/17/18: Ct cap IMPRESSION:  1. New area of lobulated hypodense nodularity at the far-inferior right liver. This raises the possibility of a new area of hepatic metastasis.  2. Conversely, other nodules within the right liver are smaller suggesting improvement.  3. Improved adenopathy at the abdominal retroperitoneum. Improved left  supraclavicular adenopathy. Stable mildly prominent right hilar lymph nodes.  4. Previously noted ill-defined soft tissue at the edmundo hepatis region is still present but appears less prominent in size.  5. New finding of segmental wall thickening of the proximal sigmoid colon with some fluid distention of the adjacent colon. This could represent a segmental colitis. Other etiologies not excluded.  12/20/18:   19.  1/22/19:  45.  3/20/19: CT cap IMPRESSION:  1. Progression of disease at right inferior hepatic mass consistent with metastatic disease.  2. Increasing ill-defined multifocal regions of soft tissue at the edmundo hepatis consistent with malignant adenopathy versus malignant implants. Associated increased intrahepatic biliary ductal dilatation.  3. Other areas of progressive adenopathy at the left neck base, mediastinum, and abdominal retroperitoneum.  4. New area of carcinomatosis medial to stomach at the left upper abdomen.     3/20/19: Cycle #1 weekly paclitaxel.   180.  5/7/19: Cycle #2 weekly paclitaxel.    142.  6/20/19: Cycle #3 weekly paclitaxel/  259.   7/11/19: CT CAP-IMPRESSION:  1. Slight increased size of the left supraclavicular lymph node. Slight progression of the mediastinal lymph nodes is also evident. New periesophageal node as described above. Retroperitoneal nodes are stable if not slightly smaller. Some of these  nodes appear to have partially calcified suggesting a response.  2. Interval decreased size of the inferior right hepatic lobe lesion now with an area of central calcification or enhancement. No new liver lesions. No significant hydronephrosis.     8/8/19: C1 gemcitabine 800mg/m2 IV days 1&8.  446.  8/23/19: Endoscopic Retrograde Cholangiopancreatogram with 4 mm Hurricane Balloon Dilation, gallbladder stent and Bile Duct stent placements, biliary Sphincterotomy   8/29/19: C2 gemcitabine 800mg/m2 IV days 1&8.  548.  9/9/19-9/14/19: Hospital admission for HA/Fatigue/Confusion/worsening abd pain.  CT CAP: IMPRESSION:   1. Larger mass in inferior segment 6 in the liver, and increased infiltrative tissue in the edmundo hepatis which extends along the right portal portal branches and bile ducts, and inferiorly to the duodenum, which is potentially now involved by tumor as there is new focal wall edema and hypoenhancement at this location, versus postprocedural edema.  1a. The tumoral progression is in keeping with rising CA-125 levels.  2. Increased mass effect on the extrahepatic portal vein, with unchanged cavernous transformation and multiple collaterals.  3. New biliary stents. Mildly increased intrahepatic biliary ductal dilatation. Increased pneumobilia, nonspecific given stents. Cholangitis is not excluded given potentially worsening obstruction.  4. New moderate right hydronephrosis and hydroureter. The right ureter appears potentially tethered to new enhancing tissue associated with the right ovarian vein concerning for focal worsening metastatic disease.  5. Mildly worsened retroperitoneal and mesenteric adenopathy.  6. New small volume ascites.  7. New mildly prominent loops of small bowel with thickened walls in the lower central abdomen. No transition point to suggest obstruction. Findings may be secondary to venous congestion given portal findings and worsening ascites, or possibly enteritis.  MRI  Head: Impression:  1. Anterior right frontal axial mass appears predominantly centrally necrotic with scattered areas of intralesional hemorrhage. Abnormal enhancement primarily in the rim of the tumor consistent with viable tumor. Given the history of ovarian cancer, this is more likely metastatic, although a primary brain tumor could have a similar appearance.  2. Moderate surrounding vasogenic edema. Unchanged mass effect on the right cerebrum with 4 mm right-to-left midline shift and slight subfalcine herniation.  Per GI: No immediate procedures were required. At the time of discharge, she was tolerating a regular diet without nausea and vomiting. Dr. Lloyd's team plan for outpatient ERCP in the future. Urology was consulted and did not feel patient required a stent at this time as creat was normal at 0.52 and plan for outpatient follow up. Choliangitis on CT scan and subsequently started on zosyn. BC + for Klebsiella pneumonia. ID recommended 14 days of IV ceftriaxone end date 9/25. New brain mass noted on MRI. Started on decadron 4mg BID. Plan is for sheath assisted right craniotomy and tumor resection with neurosurgery on 9/18.     9/18/19 - 9/21/19: Hospital admission. Stealth Assisted Right Craniotomy And Tumor Resection. Chest port removal. Decadron taper.  10/3/19: Dr Yeung follow up visit. Patient is planned for 11/15 biliary stent replacement planned: was on IV ceftriaxon 2 g IV until 9/25/2019 and then started Augmentin 875/125 mg po BID to continue until ERCP. Plan for single agent carbo followed by PARP I if she responds.  10/4/19: Placement of right ureteral stent.  10/7/19: Gamma Knife to right frontal lobe: Delivered in five fractions  10/22/19: ID follow up: Continue Augmentin for now soon. Follow-up appt for 11/22/19         Past Medical History:     Past Medical History:   Diagnosis Date     Antiplatelet or antithrombotic long-term use      Ascites      Blood clot in the legs      Diabetes (H)       Ovarian cancer (H)     serous,stg IV     Pleural effusion      Pulmonary embolism (H) 2/2012     Refusal of blood transfusions as patient is Religion      Short gut syndrome      Subclinical hypothyroidism 4/18/2013     Thrombosis of leg             Past Surgical History:      Past Surgical History:   Procedure Laterality Date     COLECTOMY       COLONOSCOPY  2/1/2012    Procedure:COLONOSCOPY; With Biopsy; Surgeon:TONI SULLIVAN; Location:[facility] OR     CYSTOSCOPY, RETROGRADES, INSERT STENT URETER(S), COMBINED Right 10/4/2019    Procedure: CYSTOSCOPY, WITH RETROGRADE PYELOGRAM AND URETERAL STENT INSERTION;  Surgeon: Wolf Gan MD;  Location: [facility] OR     ENDOSCOPIC RETROGRADE CHOLANGIOPANCREATOGRAM N/A 8/23/2019    Procedure: Endoscopic Retrograde Cholangiopancreatogram with 4 mm Hurricane Balloon Dilation, gallbladder stent and Bile Duct stent placements, Bilarry Sphincterotomy ;  Surgeon: Catrachito Lloyd MD;  Location: [facility] OR     HYSTERECTOMY TOTAL ABD, RHIANNA SALPINGO-OOPHORECTOMY, NODE DISSECTION, TUMOR DEBULKING, COMBINED  2/1/2012    Procedure:COMBINED HYSTERECTOMY TOTAL ABDOMINAL, BILATERAL SALPINGO-OOPHORECTOMY, NODE DISSECTION, TUMOR DEBULKING;  Exploratory Laparotomy, Total Abdominal Hysterectomy, Bilateral Salpingo-Oophorectomy, appendectomy,lysis of adhesions, ileal, ascending, transverse and splenic flexure resection, ileal descending bowel renanastomosis, incidental cystotomy repair, CUSA procedure and colonoscopy ; [facility]     INSERT PORT PERITONEAL ACCESS  4/3/2012    Procedure:INSERT PORT PERITONEAL ACCESS; Intraperitoneal Port Placement (c-arm); Surgeon:SAMUEL CARRASCO; Location:[facility]     INSERT PORT PERITONEAL ACCESS  5/14/2014    Procedure: INSERT PORT PERITONEAL ACCESS;  Surgeon: Nga Yeung MD;  Location: [facility]     INSERT PORT VASCULAR ACCESS       IR PORT REMOVAL RIGHT  9/20/2019     LAPAROSCOPY DIAGNOSTIC (GYN)  5/14/2014    Procedure: LAPAROSCOPY DIAGNOSTIC (GYN);   Surgeon: Nga Yeung MD;  Location: [facility]     LAPAROTOMY EXPLORATORY Right 2015    Procedure: LAPAROTOMY EXPLORATORY;  Surgeon: Nga Yeung MD;  [facility]     LAPAROTOMY, TUMOR DEBULKING, COMBINED  2014    Procedure: COMBINED LAPAROTOMY, TUMOR DEBULKING;  Surgeon: Nga Yeung MD;  Location: [facility]     OPTICAL TRACKING SYSTEM CRANIOTOMY, EXCISE TUMOR, COMBINED Right 2019    Procedure: Stealth Assisted Right Craniotomy And Tumor Resection;  Surgeon: Bertin Dewey MD;  Location: [facility]     PICC INSERTION Left 2019    5Fr - 46cm (4cm external), basilic vein, low SVC     REMOVE CATHETER PERITONEAL N/A 2014    Procedure: REMOVE CATHETER PERITONEAL;  Surgeon: Nga Yeung MD;  Location: [facility]     VASCULAR SURGERY      stent left iliac vein            Social History:     Social History     Tobacco Use     Smoking status: Former Smoker     Packs/day: 0.00     Years: 8.00     Pack years: 0.00     Types: Cigarettes     Last attempt to quit: 1980     Years since quittin.3     Smokeless tobacco: Never Used     Tobacco comment: started at 11 yo and quit at 18 yo   Substance Use Topics     Alcohol use: Yes     Comment: 3x/day wine or jodi            Family History:     Family History   Problem Relation Age of Onset     Cancer Mother 69        lung, smoker     Cancer Maternal Uncle 65        brain     Colon Cancer Maternal Aunt 80        colon            Immunizations:     Immunization History   Administered Date(s) Administered     HepA-Adult 2008, 2008     Influenza (IIV3) PF 10/20/2014     Influenza (intradermal) 10/17/2012, 10/31/2013     Influenza Vaccine IM > 6 months Valent IIV4 2016, 2017     Influenza Vaccine IM Ages 6-35 Months 4 Valent (PF) 10/08/2018     Pneumococcal 23 valent 10/17/2012            Allergies:   No Known Allergies         Medications:     Current Facility-Administered Medications   Medication      0.9% sodium chloride + KCl 20 mEq/L infusion     albuterol (PROAIR HFA/PROVENTIL HFA/VENTOLIN HFA) 108 (90 Base) MCG/ACT inhaler 2 puff     amoxicillin-clavulanate (AUGMENTIN) 875-125 MG per tablet 1 tablet     diphenoxylate-atropine (LOMOTIL) 2.5-0.025 MG per tablet 2 tablet     dronabinol (MARINOL) capsule 2.5 mg     ipratropium - albuterol 0.5 mg/2.5 mg/3 mL (DUONEB) neb solution 3 mL     [START ON 10/18/2019] levothyroxine (SYNTHROID/LEVOTHROID) tablet 50 mcg     lidocaine (LMX4) cream     lidocaine 1 % 0.1-1 mL     loperamide (IMODIUM) capsule 2 mg     LORazepam (ATIVAN) tablet 1 mg     magnesium oxide (MAG-OX) tablet 400 mg     magnesium sulfate 4 g in 100 mL sterile water (premade)     morphine (MS CONTIN) 12 hr tablet 15 mg     naloxone (NARCAN) injection 0.1-0.4 mg     ondansetron (ZOFRAN-ODT) ODT tab 4 mg    Or     ondansetron (ZOFRAN) injection 4 mg     oxyCODONE (ROXICODONE) tablet 5 mg     pantoprazole (PROTONIX) EC tablet 40 mg     potassium chloride (KLOR-CON) Packet 20-40 mEq     potassium chloride 10 mEq in 100 mL intermittent infusion with 10 mg lidocaine     potassium chloride 10 mEq in 100 mL sterile water intermittent infusion (premix)     potassium chloride 20 mEq in 50 mL intermittent infusion     potassium chloride ER (K-DUR/KLOR-CON M) CR tablet 20-40 mEq     prochlorperazine (COMPAZINE) injection 10 mg    Or     prochlorperazine (COMPAZINE) tablet 10 mg    Or     prochlorperazine (COMPAZINE) Suppository 25 mg     sodium chloride (PF) 0.9% PF flush 3 mL     sodium chloride (PF) 0.9% PF flush 3 mL     [START ON 10/18/2019] tamsulosin (FLOMAX) capsule 0.4 mg            Review of Systems:     CONSTITUTIONAL:  negative for  fevers, chills and sweats  RESPIRATORY:  negative for  dry cough and chest pain  CARDIOVASCULAR:  negative for  chest pain, palpitations  GASTROINTESTINAL:  negative for change in bowel habits and abdominal pain  GENITOURINARY:  positive for  hematuria  HEMATOLOGIC/LYMPHATIC:  negative for swelling/edema         Physical Exam:     Vitals:    10/17/19 1425   BP: 95/48   BP Location: Right arm   Resp: 16   Temp: 98.4  F (36.9  C)   TempSrc: Oral   SpO2: 96%   Weight: 54.8 kg (120 lb 12.8 oz)     CONSTITUTIONAL: Chronically ill appearing, cachectic. NAD.  HEAD: Normocephalic, atraumatic; temporal wasting  RESPIRATORY: CAMMY, LLL and RUL clear to auscultation, RML and RLL with diminished breath sounds, respirations with low effort   CV: Tachycardic, S1S2, no clicks, murmurs, rubs, or gallops; bilateral lower extremities without edema, dorsalis pedis pulses 2+ bilaterally  GASTROINTESTINAL: Normoactive bowel sounds x4 quadrants, abdomen soft, and non-tender to palpation without masses or organomegaly, no rebound tenderness, guarding, or rigidity  MUSCULOSKELETAL: Moves all extremities; BUE 5/5 strength  PSYCHIATRIC: Pleasant and interactive, affect bright, makes appropriate eye contact, thought process linear but she does not seem to have good recall of how she has been feeling lately- does not remember the day she had nausea/vomiting         Data:     Results for orders placed or performed in visit on 10/17/19 (from the past 24 hour(s))   UA with Microscopic reflex to Culture   Result Value Ref Range    Color Urine Yellow     Appearance Urine Slightly Cloudy     Glucose Urine Negative NEG^Negative mg/dL    Bilirubin Urine Negative NEG^Negative    Ketones Urine Negative NEG^Negative mg/dL    Specific Gravity Urine 1.011 1.003 - 1.035    Blood Urine Large (A) NEG^Negative    pH Urine 6.5 5.0 - 7.0 pH    Protein Albumin Urine 70 (A) NEG^Negative mg/dL    Urobilinogen mg/dL Normal 0.0 - 2.0 mg/dL    Nitrite Urine Negative NEG^Negative    Leukocyte Esterase Urine Trace (A) NEG^Negative    Source Midstream Urine     WBC Urine 6 (H) 0 - 5 /HPF    RBC Urine >182 (H) 0 - 2 /HPF    Squamous Epithelial /HPF Urine 1 0 - 1 /HPF    Mucous Urine Present (A) NEG^Negative /LPF    Clostridium difficile toxin B PCR   Result Value Ref Range    Specimen Description Feces     C Diff Toxin B PCR Negative NEG^Negative     *Note: Due to a large number of results and/or encounters for the requested time period, some results have not been displayed. A complete set of results can be found in Results Review.             Assessment and Plan:     Assessment: 59 year old female with recurrent ovarian cancer admitted for electrolyte abnormalities and failure to thrive.     Plan:  Dz: Recurrent ovarian cancer widely metastatic disease. Dx 2012 s/p debulking, s/p multiple chemo treatments (carbo/taxol, carbo/doxil, IV/IP, avastin/cytoxan, 15C niraparib), interval debulking in 2014 with recurrence in 2015 3C single-agent paclitaxel (last 6/20/19) followed by Gemzar C2D8 on 9/5. Most recently underwent stealth assisted right craniotomy and tumor resection by neurosurgery followed by gamma knife. Was seen in clinic today prior to starting single agent carbo but found to have multiple electrolyte abnormalities and failure to thrive.   FEN: Regular diet. Nutrition consult. Continue marinol. ERP for hyponautremia, hypokalemia, and hypomagnesemia. Replacement was started in clinic. Recheck BMP, mag and PO4.  Pain: Continue home MS contin, PRN oxycodone, patient's  to obtain medical cannabis tomorrow  Heme: [place]. CBC WNL. Continue home ferrous sulfate. Hx DVT/PE 2012, no current anticoagulation.  CV: NI  Pulm: Continue home PRN nebulizer for GROVES. Progression of disease with R>L pleural effusions. Plan for CXR in the AM and possible therapeutic thoracentesis.   GI: Alk PO4 722>955>970 (10/17). Biliary stent in place. Oral candidal infection continue clotrimazole. Loose stools, stool sent for cdiff. Continue Miralax starting 10/18 to prevent constipation.   : UA + blood, 70 protein, leukocytes, WBC. Right ureteral stent in place. Continue home flomax. UCx pending  ID: Continue Augmentin for  recent Klebsiella pneumonia bacteremia to continue until ERCP. Follows with ID outpatient.    Endocrine: Hypothyroidism continue home synthroid  Psych/Neuro: S/p craniotomy, tumor resection, gamma knife 9/24 with neurosurgery. Neurologically intact, no acute deficits. S/p decadron 9/24-9/27 post-op  PPX: SCDs, IS  Dispo:  Pending electrolyte replacement and improvement in nutrition.        Latoya Pena MD PGY1  Obstetrics & Gynecology  10/17/19       Provider Disclosure:   I agree with above History, Review of Systems, Physical exam and Plan. I have reviewed the content of the documentation and have edited it as needed. I have seen and personally performed the services documented here and the documentation accurately represents those services and the decisions I have made.     Electronically signed by:   Garry Riggins MD   Gynecologic Oncology   [facility]

## 2019-10-17 NOTE — PROGRESS NOTES
Infusion Nursing Note:  Odalys Nathan presents today for K/Mg replacement.    Patient seen by provider today: Yes: Josefina   present during visit today: Not Applicable.    Note: Pt received 4gm Mg and approx 30mEq K while here in clinic today.  Was able to provide both urine and stool sample prior to discharge.  Sister to [facility]  Report called to floor RN by Charu from clinic.    Intravenous Access:  Peripheral IV placed per fast track RN    Treatment Conditions:  Lab Results   Component Value Date    HGB 11.8 10/17/2019     Lab Results   Component Value Date    WBC 8.3 10/17/2019      Lab Results   Component Value Date    ANEU 5.8 10/17/2019     Lab Results   Component Value Date     10/17/2019      Lab Results   Component Value Date     10/17/2019                   Lab Results   Component Value Date    POTASSIUM 3.0 10/17/2019           Lab Results   Component Value Date    MAG 1.0 10/17/2019            Lab Results   Component Value Date    CR 0.58 10/17/2019                   Lab Results   Component Value Date    JOSE ALBERTO 8.1 10/17/2019                Lab Results   Component Value Date    BILITOTAL 1.3 10/17/2019           Lab Results   Component Value Date    ALBUMIN 2.0 10/17/2019                    Lab Results   Component Value Date    ALT 44 10/17/2019           Lab Results   Component Value Date    AST 40 10/17/2019           Post Infusion Assessment:  Patient tolerated infusion without incident.  Blood return noted pre and post infusion.  Site patent and intact, free from redness, edema or discomfort.  No evidence of extravasations.  Access discontinued per protocol.       Discharge Plan:   Discharge instructions reviewed with: Patient.  Patient and/or family verbalized understanding of discharge instructions and all questions answered.  Patient discharged in stable condition accompanied by: sister; to be direct admit to [facility]th floor at the  [facility]  Departure Mode: Wheelchair.    Loree Ellis RN

## 2019-10-17 NOTE — LETTER
10/17/2019         RE: Odalys Nathan  [address]  [facility] [id number]        Dear Colleague,    Thank you for referring your patient, Odalys Nathan, to the [facility]. Please see a copy of my visit note below.    Gynecologic Oncology Follow-Up Note    RE: Odalys Nathan  MRN: [id number]  : 1960  Date of Visit: 10/17/2019    CC: Odalys Nathan is a 59 year old year old female with recurrent ovarian cancer who presents today for follow up regarding disease management.    HPI: Odalys comes to the clinic accompanied by her  Marcos and her sister Carmen. Odalys comes to the clinic feeling fair- she recently underwent surgical resection of a brain metastasis followed by gamma knife radiation. She was also recently hospitalized for Klebsiella bacteremia of unknown source, though she does have a biliary stent. She also has a recently placed ureteral stent. On Augmentin due to concern that biliary stent may be the source of her bacteremia.  She presents today for consideration of cycle one single agent carboplatin. She reports she has been fatigued, somewhat weak. Not eating or drinking well at home- reports that dysgeusia is the biggest factor in not eating well. Was able to drink a Boost shake today which was surprising to her and her family. She is not currently nauseated but her family reports she had nausea with vomiting three days ago. She also reports abdominal pain that improves with a bowel movement- feels constipated but has been having liquid stools the past few days. Three bouts of diarrhea yesterday, two today already. Of note, does take Miralax twice daily to prevent constipation. Abdominal pain does bother her- takes MS Contin BID but has not yet taken this today. She also notes increased urinary frequency, dysuria, and voiding small amounts- taking Flomax since her ureteral stent placement but still remains symptomatic. Denies hematuria or flank  pain. Denies fevers or chills, no bleeding. Does note decreased activity tolerance and GROVES, denies SOB at rest or persistent cough. Has been requiring nebulizer twice daily for GROVES. Denies chest pain, palpitations, falls. She does not feel her mentation has significantly changed, though her  notes her short term memory is worsened. Her  and sister also note that Odalys had right foot drop the past couple of weeks- per Odalys, this resolved a week ago but she still has problems with R dorsiflexion. Denies severe headaches, changes in her vision, gait disturbance, weakness, slurred speech, changes in sensation.      Oncology History:  1/18/2012 - Admitted to hospital for 2 weeks of intermittent abdominal cramping, distention, diarrhea and N/V. CT of abdomen/pelvis significant for small bowel obstruction, a heterogenous soft tissue density in the pelvis, omental nodules, and ascites. Bilateral adnexal masses per U/S of pelvis. CA-125 was elevated at 987, CEA was normal at 1.0.    1/18/12 - Therapeutic paracentesis (4 L) with cytology confirming malignancy (RI positive, weak ER positive, CK-7 positive consistent with GYN primary). Surgery recommended d/t potential for falling blood counts 2/2 chemotherapy (patient is Rastafari and limiting blood transfusion)    2/1/12 - Exploratory laparotomy, MAR BSO, lysis of adhesion, Appendectomy, Repair cystotomy, Omentectomy Tzlu-[id number]-jicinnwwa-transverse-colon resection, Ileo-descending colon anastomosis, CUSA, & Colonoscopy (done by Dr. Amato and colorectal team).  2/20/2012 - Admitted to hospital for bilateral pulmonary emboli and drainage of pleural effusion. Started on Lovenox.  2/28/12-3/21/12: Cycle #1-2 Carbo/Taxol IV  3/29/12 - Started on Keflex by her PCP for infection in her healing wound (immediately below her umbilicus).    4/11/12-6/14/12: Cycle #3-6 IV chemo, Cycle #1 IV/IP chemo  7/16/12 -  8, CT PRADEEP - enrolled in  -  observation arm  3/4/13-6/28/13:  6, 9, 12, 15, 20.  7/1/13: CT Chest, Abdomen, Pelvis IMPRESSION:  1. Worsening metastatic ovarian carcinoma suggested by increased size of soft tissue nodules anterior to the right psoas muscle, that may represent growing mesenteric lymphadenopathy.  2. Remaining prominent right lower quadrant mesenteric lymph nodes are not significantly changed from CT 7/16/2012.  3. Clustered nodular opacities in the right lower lobe are not significant change from 7/16/2012 and remain indeterminate. Again, the appearance and distribution is suggestive of an infectious etiology.  4. Stable 8 mm soft tissue nodule in left breast, unchanged since at least 02/20/2012. This can be observed on followup studies, but correlation with mammography could be considered.  Decision made to start Doxil/Carbo.  7/11/13: The left ventricular ejection fraction is normal at 66.4%.  7/12/13-9/6/13: Cycle #1-3 Doxil/Carbo.  10, 11.    9/30/13 CT C/A/P Impression:  1. Overall, favorable response to treatment with decreasing size of soft tissue nodules tracking along the anterior aspect of the right psoas muscle.  2. 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  3. Improved cluster of right lower lobe pulmonary nodular opacities. These may represent resolving infection.  10/3/13-12/9/13:  9, 8, 10. Cycle 4-6 Doxil/Carbo.    1/13/14 CT C/A/P Impression:   1. Stable appearance of metastatic ovarian cancer. Scattered soft tissue nodules along the anterior aspect of the right psoas muscle are unchanged in size. Mild mesenteric lymphadenopathy is unchanged.  2. Clustered micronodules in the right lower lobe are unchanged from 9/30/2013, but improved from 7/1/2013. This history suggests a postinflammatory/postinfectious etiology.  3. 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  1/16/14 Discussed multiple options for her based on relatively stable-appearing disease on CT but slight increase in   (which has had small increase to 20 with last recurrence) including chemo break with recheck of  in 1 month, starting new chemo agent immediately, and exploratory surgery with possible resection of nodules. She is considered platinum-sensitive based on > 1 year remission after Taxol/Carbo, which we will take into consideration for future chemo planning. I am not inclined to surgery at this time given difficulty she already has with diarrhea secondary to past colon resection. I suspect we would need to resect further bowel due to mesenteric disease. Also explained inherent risks of any major surgery. Also mentioned maintenance chemo, but this has not been shown to increase overall survival and would likely decrease her quality of life without significant benefit. Family is going on vacation to [place] in 2 weeks and Odalys does not want to have chemo prior to that, so will plan to take 1 month break. She can have  at that time (discussed checking toady since last draw was in early December, but as it would likely not change treatment plan and she has h/o slow rising , will not check today).  2/17/14  16    2/20/14: Decision to take break from chemo for two months, followed by CT and CA-125.    4/21/14  27  4/21/14 CT C/A/P Impression:    1. Increased size of 2 low-attenuation lymph nodes anterior to the right psoas muscle is concerning for worsening metastatic ovarian cancer.    2. New circumferential thickening of a 3.8 cm length segment of distal transverse colon is likely physiologic. Recommend attention on followup imaging.    3. Grossly unchanged size of clustered small nodules versus scarring in the right lower lobe the lungs.    4. Stable thrombosis of the right ovarian vein.  5/14/14: Diagnostic laparoscopy converted to exploratory laparotomy and removal of mesenteric masses, tumor debulking, peritoneal biopsies and intraperitoneal port placement. On laparoscopy, it was noted  "that there were small nodules on the anterior abdominal wall near the previous incision, small nodules on the right pelvic sidewall as well. Nodules were palpated in the mesentery; however, as it was unable to clarify where the origin of the nodules was, the decision was made to open the patient. On opening there was found to be approximately a 3 cm nodule in the small bowel mesentery and another separate approximately 2 cm nodule in the bowel mesentery. Pelvis without evidence of cancer, some mesenteric lymph nodes were palpated. No evidence otherwise of any disseminated cancer throughout the abdomen.    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 (2/2)  -Largest metastasis: 1.3 cm  -See comment  D: Peritoneum, right paracolic gutter #2, biopsy:  -Fibroadipose tissue with granulomatous inflammation surrounding refractile material  -Negative for malignancy   E: Small bowel adhesion, biopsy:  -Fibroconnective tissue, consistent with adhesion  -Negative for malignancy  F: Lymph nodes, mesentry, not otherwise specified, excision:  -Two lymph nodes, negative for metastatic carcinoma (0/2)  G: Lymph node, mesentery, \"#2\", excision:  -One lymph node, negative for metastatic carcinoma (0/1)  H: Lymph nodes, mesentery, \"nodule #2\", excision:  -Five lymph nodes, negative for metastatic carcinoma (0/5)  COMMENT:  Some of the specimens show post-operative changes. Others show possible treatment related changes, including necrosis. The metastatic carcinoma in the mesenteric lymph nodes (specimen C) shows variable morphology, including relatively low grade tumor with papillary " architecture, and high grade tumor comprised of nests of tumor cells with irregular, slit-like spaces and marked nuclear pleomorphism.    5/29/14: Cycle 1 IV PACLitaxel / IP CISplatin / IP PACLitaxel.  - 28.  6/26/14: Cycle #2 IV/IP.  10  8/5/14: CT chest/abd/pelvis IMPRESSION     1. In this patient with ovarian cancer, overall findings are indicative of stable/slight improvement, as multiple mesenteric lymphadenopathy and scattered nodular peritoneal soft tissue mass lesions appear unchanged or slightly smaller since 4/21/2014.    2. 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 Mild dilatation of the second and the third portion of the duodenum with a narrow SMA angle. This could represent SMA syndrome, if clinically correlated.17/14:    Cycle #3 IV/IP.  10.    CT chest/abd/pelvis with contrast on 8/5/14    Impression:    1. In this patient with ovarian cancer, overall findings are indicative of stable/slight improvement, as multiple mesenteric lymphadenopathy and scattered nodular peritoneal soft tissue mass lesions appear unchanged or slightly smaller since 4/21/2014.    2. 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 Mild dilatation of the second and the third portion of the duodenum with a narrow SMA angle. This could represent SMA syndrome, if clinically correlated.  8/7/14: Cycle #4 Taxol/Carbo (changed from IV/IP).  10. She has been feeling okay. She is unsure if she can finish out the course of 6 cycles IV/IP taxol/cisplatin. She feels like she has the flu for about a week then starts feeling gradually better after each chemo cycle. Her spouse notes that she actually has been more sick with the treatments than she initially admits here. She was also previously having some rib pain. Denies any rib pain now. Denies any chest pain or shortness of breath.  Plan: discussed recent CT cap results and switching to just IV as she is feeling miserable with IP  treatments. Switch to IV carbo/taxol as patient is platinum sensitive.  We also discussed her taking part of the tesaro trial, which would require BRCA testing. She would like to take part in this trial if eligible.  8/20/14: Remove Intraperitoneal Port ( Port and catheter intact - discarded)  8/28/14: Cycle #5 Taxol/Carbo held due to thrombocytopenia.  6.    She denies any vaginal bleeding, no changes in her bowel or bladder habits, no nausea/emesis, no lower extremity edema, and no difficulties eating or sleeping. She denies any abdominal discomfort/bloating, no fevers or chills, and no chest pain or shortness of breath. She states her diarrhea is the same. She reports some fatigue which improves about 1-2 weeks after her chemotherapy. She states she does not need any medication refills and she was told she does not meet the criteria for the TESARO trial. She states she has 3 bags of iv fluids left over from her previous chemotherapy and will give these to herself. She states she is ready for her treatment today.    9/29/14: Cycle #6 Taxol/Carbo  6. Insurance questions regarding GSF coverage today. No concerns other than fatigue. Taking iron for anemia and does not desire blood transfusion. Using neulasta for neutropenia. Using home IV hydration if needed. Baseline unchanged. No abdominal bloating, constipation, diarrhea, pain, vaginal or rectal bleeding, cough or dyspnea, fluid retention.    10/16/14: Impression:    1. Nodular peritoneal soft tissue mass in the right lower quadrant adjacent to the psoas muscle is no longer appreciated. Adjacent prominent lymphadenopathy is unchanged from previous exam. No new peritoneal lesions.    2. 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     10/20/14:  5. CT chest/abdomen/pelvis on 10/16/14 showed nodular peritoneal soft tissue mass in the right lower quadrant adjacent to the psoas muscle is no longer appreciated. Adjacent prominent  lymphadenopathy is unchanged from previous exam. 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  1/27/15:  6.  4/28/15:  14.  5/26/15:  18.  6/2/15: CT cap Impression:  1. Postsurgical changes of hysterectomy and bilateral salpingo-oophorectomy for ovarian cancer. There is a new 8 mm hazy, ill-defined hypoattenuating lesion in hepatic segment 6 which is suspicious for a metastatic deposit. Further evaluation with ultrasound in recommended.    2. Increased size of a left retroperitoneal lymph node which is indeterminate but may represent a ciro metastasis. Mildly prominent lymph nodes in the right lower quadrant are not significantly changed.  3. Moderate colonic stool burden.    6/4/15: US abdomen IMPRESSION:    Hyperechoic lesion in the right hepatic lobe, consistent with hemangioma. This does not corresponds to the area of the lesion seen on CT from 6/2/2015. An MR would be helpful for identifying and characterizing the lesion from the recent CT.  6/12/15: MR abd IMPRESSION:  1. New 20 x 11 mm enhancing lesions between the right obliques, concerning for metastatic disease. This lesions should be amenable to percutaneous biopsy, if indicated.  2. Correlating to the lesion visualized on comparison CT is a hepatic segment 6 subcapsular 7 mm lesion. Overall the appearance favors the diagnosis of a simple cyst. However, there is faint suggestion of mild peripheral arterial enhancement. Although this is favored as  artifactual, this should be followed up to confirm stability. Recommend 6 month followup.  3. Hepatic segment 6, 5 mm lesion too small to technically characterize. Differential would favor FNH, less likely flash filling hemangioma. Recommend attention on followup.  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      9/1/15:  Cycle #1 Avastin/Cytoxan.   31.      9/24/15:feeling generally well. She says she has been having back and stomach spasms. She is taking cytosine daily (she ran out yesterday). She also says its affecting her voice. Admits that it burns occasionally. She is eating and drinking normal. She also admit diarrhea, 5-7 times daily, lose/watery. She trying to stay hydrated and eat fiber. She also says that her body is sore, especially the bottom of her feet. Her blood pressure is normal. She also admits having headache after her first infusion.       9/24/15: Cycle #2 Avastin/Cytoxan.  19.  10/15/15: Cycle #3 Avastin/Cytoxan.  16.      11/6/15: CT c/a/p IMPRESSION:    1. Stable postoperative change of MAR/BSO for ovarian cancer.  2. The lesion in the right flank abdominal musculature is slightly decreased in size. Otherwise, stable examination.  2. No evidence of metastatic disease in the chest.     11/20/15: Treatment planning visit,  16     11/25/15: surgical pathology report  FINAL DIAGNOSIS:  Soft tissue, right oblique muscle mass, excision:  -Recurrent ovarian serous carcinoma  -Carcinoma is present less than 1 mm from one resection margin  -Background skeletal muscle and fibroadipose tissue     1/4/16:  22  1/11/16-1/27/16: Radiation to right flank x 12 treatments  4/7/2016:  94. CT cap IMPRESSION:    In this patient with ovarian cancer status post MAR/BSO and descending/transverse colectomy:  1. No evidence for malignancy in the chest, abdomen, or pelvis.  2. Stable small hypodense segment 6 liver lesion, appears more likely benign, possibly a cyst.  5/13/16:  124.  6/3/16: PET CT IMPRESSION: In this patient with a history of ovarian cancer:  1. Hypermetabolic and enlarging periaortic and perihepatic lymphadenopathy compatible with metastatic disease, as detailed above.  2. Although hypodense lesion in hepatic segment 6 has been present since 6/2/2015 associated  hypermetabolism makes this lesion highly concerning for metastatic disease.     Plan: to start Niraparib under TESARO study.      6/9/16:  137.  6/14/16: Cycle #1 Niraparib.    6/28/16: Cycle #1 D15 Niraparib.    7/5/16:  100.    7/11/16: Cycle #2 Nraparib.   83.     8/3/2016: PET CT IMPRESSION:    In this patient with known history of ovarian cancer:  1) New pleural based nodular opacities in the lateral and inferior aspects of the bilateral lower lobes, worse in the left lung. Likely infection. Close follow up is recommended.      2) Slight decrease in hypermetabolic abdominal lymphadenopathy. 2 hypermetabolic lymph nodes persist.  3) Unchanged right hepatic lobe metastatic lesion.      8/9/16: Cycle #3 Niraparib.  69.  9/6/16: Cycle #4 Niraparib.  53. Dose held due to anemia.  9/13/16: Eval for potential cycle 4 niraparib. Dose held due to anemia.  10/4/16: CT CAP impression:  IMPRESSION: In this patient with a known history of ovarian cancer:  1. There has been interval resolution of pleural-based nodular  opacities which likely represented infection.  2. Abdominal lymphadenopathy in the form of 2 portacaval lymph nodes  have not significantly changed in size, noted to be hypermetabolic on  prior PET/CT.  3. Previously demonstrated metastatic lesion in the right lobe of the  liver is not significantly changed.  10/11/16:  60  11/1/16: C6 niraparib,  75  11/29/16: C7 niraparib.  78. CT CAP impression as follows:  Target lesions (RECIST criteria):       A previously described target lesion superior to the head of the  pancreas (series 2, image 64)  (referred to as a perihepatic node on  6/3/2016) may not be a valid target lesion because it measured less  than 1.5 cm originally. However, this particular node has decreased in  size, now measuring 7 mm in short axis versus 14 mm on 6/3/2016 when  measured in a similar fashion.       2.3 cm short axis portacaval lymph  node on series 2 image 67,  previously 2.0 cm on 10/4/2016.       1.2 cm subtle hypodensity in hepatic segment 6 on series 2 image 75,  stable on multiple studies since at least 6/3/2016     Sum of diameters today: 3.5 cm. Sum of diameters 10/4/2016: 3.2 cm.  Growth = 9%.    12/27/16: C8 niraparib.  105.  1/25/17: C9 niraparib.  108.  2/23/17: C10 niraparib.  132.   CT CAP impression:  Sum of target lesion diameters today: 3.7 cm. Sum of target lesion  diameters on 11/28/2016: 3.5 cm. Growth= 6%  1. In this patient with history of ovarian cancer there is stable  disease by RECIST criteria as evidenced by:   1a. Mildly increased size of liver metastasis.  1b. Stable portacaval lymphadenopathy.  1c. No evidence of metastatic disease in the chest.  2. Trace emphysematous changes of the lungs.  3/22/17: C11 niraparib.  132.  4/19/17: C12 niraparib.  127.  5/16/17: CT CAP IMPRESSION: In this patient with ovarian cancer:  1. Mildly increased size of hepatic metastasis segment 6 with subtle increased capsular retraction.  2. Stable edmundo hepatis nodes, with mild increase in size of periaortic lymph nodes.  3. Prominent left supraclavicular lymph node with subtle increase in size compared to prior studies, particularly comparing to 10/4/2016.  4. Mild subtle groundglass opacities in the right upper lobe, not present on prior study, lesser extent in the right lower lobe.  Findings may represent infection, additional consideration is malignancy (less likely), and attention on follow-up study  Recommended.  Addendum:   Prominent left supraclavicular lymph node (3/25) is stable from most recent CT performed 2/20/2017, currently measuring 14 x 16 mm, previously 14 x 16 mm on 2/20/2017.      The portal caval lymph node/edmundo hepatis lymph node is stable from 2/20/2017, measuring 21 mm.      Clarification of size of the para-aortic lymph node (series 3 image 327). It short axis measurement is 11 mm  versus 9 mm on prior study.      Hepatic segment 6 triangular-shaped low density lesion (3/362) measures 14 mm, previously measured 12 mm, demonstrating a  possible/questionable minimal subtle increase in size. Similarly the lymph nodes noted above in the supraclavicular and para-aortic regions demonstrate possible minimal possible subtle increase in size.      5/18/17: Cycle #13 Niraparib.  191.  6/15/17: Cycle #14 Niraparib.  146.  7/13/17: Cycle #15 Niraparib 200 mg.  171     CT (8/9/17):       IMPRESSION:  1. Segment 6 hepatic metastasis is stable to minimally increased in size.  2. Slight increase in multicentric adenopathy, most pronounced at the edmundo hepatis. Additional sites include the inferior left neck/supraclavicular region and retroperitoneum which appear stable to  minimally increased.      8/10/17:  175  9/14/17: MUGA LVEF 54%  9/22/17: C1D1 carboplatin/Doxil.  187.  10/19/17: C2D1 carboplatin/Doxil.  108.  11/17/17: C3D1 carboplatin/Doxil.  82.  12/14/17: C4D1 carboplatin/Doxil/avastin.  83.  Of note, avastin held on C4D14  1/12/18: C5D1 carboplatin/Doxil/avastin.  80.  1/26/18: platelets 61, patient was not given avastin due to being jehova's witness.   2/9/18: C6D1 carboplatin/Doxil/Avastin.   71.  3/2/18:  49. Three month treatment break.  6/20/2018:  170. CT CAP:  IMPRESSION: In this patient with history of ovarian cancer:  1. Increased size of a right hepatic lobe lesion and numerous edmundo hepatis and retroperitoneal lymph nodes compatible with progression of metastatic disease.  2. New mild intrahepatic biliary ductal dilatation, periportal edema, and pericholecystic fluid. Increased soft tissue fullness in the edmundo hepatis in the expected location of the common hepatic duct. Findings  are suspicious for developing biliary ductal obstruction secondary to worsening metastatic disease in the edmundo hepatis. Correlation with  liver function tests is recommended. Right upper quadrant ultrasound  may be beneficial.  3. Unchanged left supraclavicular and right hilar lymphadenopathy in the chest.      8/3/18: C1D1 weekly paclitaxel.  not done.  9/20/18: C2D1 weekly paclitaxel 80mg/m2. Ca 125-56  11/8/2018: C3D1 weekly paclitaxel;  26     12/17/18: Ct cap IMPRESSION:  1. New area of lobulated hypodense nodularity at the far-inferior right liver. This raises the possibility of a new area of hepatic metastasis.  2. Conversely, other nodules within the right liver are smaller suggesting improvement.  3. Improved adenopathy at the abdominal retroperitoneum. Improved left  supraclavicular adenopathy. Stable mildly prominent right hilar lymph nodes.  4. Previously noted ill-defined soft tissue at the edmundo hepatis region is still present but appears less prominent in size.  5. New finding of segmental wall thickening of the proximal sigmoid colon with some fluid distention of the adjacent colon. This could represent a segmental colitis. Other etiologies not excluded.     12/20/18:   19.  1/22/19:  45.  3/20/19: CT cap IMPRESSION:  1. Progression of disease with increasing size of a right inferior hepatic mass consistent with metastatic disease.  2. Increasing ill-defined multifocal regions of soft tissue at the edmundo hepatis consistent with malignant adenopathy versus malignant implants. Associated increased intrahepatic biliary ductal dilatation.  3. Other areas of progressive adenopathy noted at the left neck base, mediastinum, and abdominal retroperitoneum.  4. New area of carcinomatosis medial to stomach at the left upper abdomen.     3/20/19: Cycle #1 weekly paclitaxel.   180.  5/7/19: Cycle #2 weekly paclitaxel.    142.  6/20/19: Cycle #3 weekly paclitaxel/  259.     7/11/19: CT CAP-IMPRESSION:  1. Slight increased size of the left supraclavicular lymph node.  Slight progression of the mediastinal  lymph nodes is also evident. New  periesophageal node as described above. Retroperitoneal nodes are  stable if not slightly smaller. Some of these nodes appear to have  partially calcified suggesting a response to interval therapy.  2. Interval decreased size of the inferior right hepatic lobe lesion  now with an area of central calcification or enhancement. No new liver  lesions. Remaining abdominal organs are grossly unremarkable. No  significant hydronephrosis.  3. Postop changes involving the bowel and pelvic region. No recurrent  pelvic mass.     8/8/19: C1 gemcitabine 800mg/m2 IV days 1&8.  446.    8/23/19: Endoscopic Retrograde Cholangiopancreatogram with 4 mm Hurricane Balloon Dilation, gallbladder stent and Bile Duct stent placements, biliary Sphincterotomy       8/29/19: C2 gemcitabine 800mg/m2 IV days 1&8.  548.    Past Medical History:   Diagnosis Date     Antiplatelet or antithrombotic long-term use      Ascites      Blood clot in the legs      Diabetes (H)      Ovarian cancer (H)     serous,stg IV     Pleural effusion      Pulmonary embolism (H) 2/2012     Refusal of blood transfusions as patient is [employer]      Short gut syndrome      Subclinical hypothyroidism 4/18/2013     Thrombosis of leg        Past Surgical History:   Procedure Laterality Date     COLECTOMY       COLONOSCOPY  2/1/2012    Procedure:COLONOSCOPY; With Biopsy; Surgeon:TONI SULLIVAN; [facility]     CYSTOSCOPY, RETROGRADES, INSERT STENT URETER(S), COMBINED Right 10/4/2019    Procedure: CYSTOSCOPY, WITH RETROGRADE PYELOGRAM AND URETERAL STENT INSERTION;  Surgeon: Wolf Gan MD;  [facility]     ENDOSCOPIC RETROGRADE CHOLANGIOPANCREATOGRAM N/A 8/23/2019    Procedure: Endoscopic Retrograde Cholangiopancreatogram with 4 mm Hurricane Balloon Dilation, gallbladder stent and Bile Duct stent placements, Bilarry Sphincterotomy ;  Surgeon: Catrachito Lloyd MD;  [facility]     HYSTERECTOMY TOTAL  ABD, RHIANNA SALPINGO-OOPHORECTOMY, NODE DISSECTION, TUMOR DEBULKING, COMBINED  2/1/2012    Procedure:COMBINED HYSTERECTOMY TOTAL ABDOMINAL, BILATERAL SALPINGO-OOPHORECTOMY, NODE DISSECTION, TUMOR DEBULKING;  Exploratory Laparotomy, Total Abdominal Hysterectomy, Bilateral Salpingo-Oophorectomy, appendectomy,lysis of adhesions, ileal, ascending, transverse and splenic flexure resection, ileal descending bowel renanastomosis, incidental cystotomy repair, CUSA procedure and colonoscopy ; Curtis     INSERT PORT PERITONEAL ACCESS  4/3/2012    Procedure:INSERT PORT PERITONEAL ACCESS; Intraperitoneal Port Placement (c-arm); Surgeon:SAMUEL CARRASCO; [facility]     INSERT PORT PERITONEAL ACCESS  5/14/2014    Procedure: INSERT PORT PERITONEAL ACCESS;  Surgeon: Nga Yeung MD;  [facility]     INSERT PORT VASCULAR ACCESS       IR PORT REMOVAL RIGHT  9/20/2019     LAPAROSCOPY DIAGNOSTIC (GYN)  5/14/2014    Procedure: LAPAROSCOPY DIAGNOSTIC (GYN);  Surgeon: Nga Yeung MD;  [facility]     LAPAROTOMY EXPLORATORY Right 11/25/2015    Procedure: LAPAROTOMY EXPLORATORY;  Surgeon: Nga Yeung MD;  [facility]     LAPAROTOMY, TUMOR DEBULKING, COMBINED  5/14/2014    Procedure: COMBINED LAPAROTOMY, TUMOR DEBULKING;  Surgeon: Nga Yeung MD;  [facility]     OPTICAL TRACKING SYSTEM CRANIOTOMY, EXCISE TUMOR, COMBINED Right 9/18/2019    Procedure: Stealth Assisted Right Craniotomy And Tumor Resection;  Surgeon: Bertin Dewey MD;  [facility]     PICC INSERTION Left 09/20/2019    5Fr - 46cm (4cm external), basilic vein, low SVC     REMOVE CATHETER PERITONEAL N/A 8/20/2014    Procedure: REMOVE CATHETER PERITONEAL;  Surgeon: Nga Yeung MD;  [facility]     VASCULAR SURGERY      stent left iliac vein       Current Outpatient Medications   Medication     albuterol (PROAIR HFA/PROVENTIL HFA/VENTOLIN HFA) 108 (90 Base) MCG/ACT inhaler     amoxicillin-clavulanate  (AUGMENTIN) 875-125 MG tablet     Ascorbic Acid (VITAMIN C PO)     Calcium Carbonate-Vitamin D (CALCIUM + D PO)     clotrimazole 10 MG shane     cyanocobalamin (VITAMIN B12) 1000 MCG/ML injection     diphenoxylate-atropine (LOMOTIL) 2.5-0.025 MG per tablet     dronabinol (MARINOL) 2.5 MG capsule     Ferrous Sulfate 324 (65 Fe) MG TBEC     HEMP OIL OR EXTRACT OR OTHER CBD CANNABINOID, NOT MEDICAL CANNABIS,     HERBALS     ipratropium - albuterol 0.5 mg/2.5 mg/3 mL (DUONEB) 0.5-2.5 (3) MG/3ML neb solution     LEVOTHYROXINE SODIUM PO     loperamide (IMODIUM) 2 MG capsule     LORazepam (ATIVAN) 1 MG tablet     magnesium oxide (MAG-OX) 400 MG tablet     medical cannabis (Patient's own supply)     morphine (MS CONTIN) 15 MG CR tablet     naloxone (NARCAN) 4 MG/0.1ML nasal spray     ondansetron (ZOFRAN-ODT) 4 MG ODT tab     order for DME     oxyCODONE (ROXICODONE) 5 MG tablet     pantoprazole (PROTONIX) 40 MG EC tablet     polyethylene glycol (MIRALAX/GLYCOLAX) packet     potassium chloride (KLOR-CON) 20 MEQ packet     prochlorperazine (COMPAZINE) 10 MG tablet     tamsulosin (FLOMAX) 0.4 MG capsule     VITAMIN E NATURAL PO     dexamethasone (DECADRON) 2 MG tablet     No current facility-administered medications for this visit.        No Known Allergies    Family History   Problem Relation Age of Onset     Cancer Mother 69        lung, smoker     Cancer Maternal Uncle 65        brain     Colon Cancer Maternal Aunt 80        colon       Social History     Socioeconomic History     Marital status:      Spouse name: Not on file     Number of children: Not on file     Years of education: Not on file     Highest education level: Not on file   Occupational History     Not on file   Social Needs     Financial resource strain: Not on file     Food insecurity:     Worry: Not on file     Inability: Not on file     Transportation needs:     Medical: Not on file     Non-medical: Not on file   Tobacco Use     Smoking status:  "Former Smoker     Packs/day: 0.00     Years: 8.00     Pack years: 0.00     Types: Cigarettes     Last attempt to quit: 1980     Years since quittin.3     Smokeless tobacco: Never Used     Tobacco comment: started at 11 yo and quit at 16 yo   Substance and Sexual Activity     Alcohol use: Yes     Comment: 3x/day wine or jodi     Drug use: No     Sexual activity: Not on file   Lifestyle     Physical activity:     Days per week: Not on file     Minutes per session: Not on file     Stress: Not on file   Relationships     Social connections:     Talks on phone: Not on file     Gets together: Not on file     Attends Hinduism service: Not on file     Active member of club or organization: Not on file     Attends meetings of clubs or organizations: Not on file     Relationship status: Not on file     Intimate partner violence:     Fear of current or ex partner: Not on file     Emotionally abused: Not on file     Physically abused: Not on file     Forced sexual activity: Not on file   Other Topics Concern     Parent/sibling w/ CABG, MI or angioplasty before 65F 55M? Not Asked   Social History Narrative     Not on file           ROS  12 point ROS reviewed and negative except as listed in HPI      Physical Exam:    /68   Pulse 106   Temp 99.5  F (37.5  C) (Tympanic)   Resp 16   Ht 1.651 m (5' 5\")   Wt 53.8 kg (118 lb 8 oz)   SpO2 95%   BMI 19.72 kg/m       CONSTITUTIONAL: Chronically ill but non-toxic appearing female, appears fatigued and thin  HEAD: Normocephalic, atraumatic; temporal wasting  EYES: PERRLA; no scleral icterus, EOMI  ENT: Oropharynx pink without lesions, thick white coating to tongue  NECK: Neck supple, firm fixed left supraclavicular node roughly 4cm x 4cm, non-tender without erythema or edema, not eroding through skin  RESPIRATORY: CAMMY, LLL and RUL clear to auscultation, RML and RLL with diminished breath sounds, respirations unlabored   CV: Tachycardic, S1S2, no clicks, murmurs, " rubs, or gallops; bilateral lower extremities without edema, dorsalis pedis pulses 2+ bilaterally  GASTROINTESTINAL: Normoactive bowel sounds x4 quadrants, abdomen soft, slightly distended, and non-tender to palpation without masses or organomegaly, no rebound tenderness, guarding, or rigidity  GENITOURINARY: Not indicated  MUSCULOSKELETAL: Moves all extremities; BUE 5/5 strength, RLE plantarflexion 4/5, RLE dorsiflexion 3/5, BLE plantarflexion and dorsiflexion 5/5  NEUROLOGIC: CN II-XII intact, gait not assessed  SKIN: Appropriate color for race, warm and dry, no rashes or lesions to unclothed skin  PSYCHIATRIC: Pleasant and interactive, affect bright, makes appropriate eye contact, thought process linear but she does not seem to have good recall of how she has been feeling lately- does not remember the day she had nausea/vomiting    Labs:      10/17/2019  Day 1 (Planned)   Hemoglobin 11.7 - 15.7 g/dL 11.8   Hematocrit 35.0 - 47.0 % 36.3   Platelet Count 150 - 450 10e9/L 412   Absolute Neutrophil 1.6 - 8.3 10e9/L 5.8   Sodium 133 - 144 mmol/L 126 (A)   Potassium 3.4 - 5.3 mmol/L 3.0 (A)   Chloride 94 - 109 mmol/L 92 (A)   Carbon Dioxide 20 - 32 mmol/L 27   Urea Nitrogen 7 - 30 mg/dL 7   Creatinine 0.52 - 1.04 mg/dL 0.58   Calcium 8.5 - 10.1 mg/dL 8.1 (A)   Magnesium 1.6 - 2.3 mg/dL 1.0 (A)   Bilirubin Total 0.2 - 1.3 mg/dL 1.3   ALT 0 - 50 U/L 44   AST 0 - 45 U/L 40   Alkaline Phosphatase 40 - 150 U/L 970 (A)  Specimen run with a dilution   Albumin 3.4 - 5.0 g/dL 2.0 (A)   Protein Total 6.8 - 8.8 g/dL 6.6 (A)   WBC 4.0 - 11.0 10e9/L 8.3    pending    Assessment/Plan:  1) Recurrent ovarian cancer: Overall clinical picture is concerning for failure to thrive- multiple electrolyte abnormalities, hypoalbuminemia, suspect dehydration as well. Will start IV fluids and replace magnesium and potassium in clinic, however, she will be admitted to observation for further monitoring and management of her chronic  hyponatremia given the moderate degree, recent brain surgery and brain metastasis radiation, and reports of R foot drop, nausea/vomiting, weakness, and changes in short term memory. Report given to Beth Lynch CNP with inpatient gynecologic oncology team.  2) Diarrhea: Potentially due to Augmentin and scheduling Miralax, however, given her recent hospitalization and antibiotic therapy as well as abdominal discomfort will obtain stool for C diff sample.  3) Dysuria: Recent ureteral stent placement- obtain UA/UC to eval for infection  4) GROVES: Likely multifactorial and related to deconditioning and pleural effusions. Recent imaging demonstrated pleural effusions and she has diminished RML and RLL breath sounds. We discussed thoracentesis and she is amenable to this.   5) Chemo: Not suitable for carboplatin infusion today given significant electrolyte abnormalities and acute concerns. Defer carboplatin until resolution of acute issues.      >40 minutes of face to face time were spent with the patient with over 50% of that time spent in counseling, coordination of care, education, and symptom management.    HAILE De Paz, FNP-C  Division of Gynecologic Oncology  [facility]  Pager: [phone]            Again, thank you for allowing me to participate in the care of your patient.        Sincerely,        HAILE De Paz CNP

## 2019-10-17 NOTE — NURSING NOTE
"Oncology Rooming Note    October 17, 2019 8:38 AM   Odalys Nathan is a 59 year old female who presents for:    Chief Complaint   Patient presents with     Oncology Clinic Visit     Ovarian cancer, Brain Tumor     Initial Vitals: /68   Pulse 119   Temp 99.5  F (37.5  C) (Tympanic)   Resp 16   Ht 1.651 m (5' 5\")   Wt 53.8 kg (118 lb 8 oz)   SpO2 95%   BMI 19.72 kg/m   Estimated body mass index is 19.72 kg/m  as calculated from the following:    Height as of this encounter: 1.651 m (5' 5\").    Weight as of this encounter: 53.8 kg (118 lb 8 oz). Body surface area is 1.57 meters squared.  No Pain (0) Comment: Data Unavailable   No LMP recorded. Patient has had a hysterectomy.  Allergies reviewed: Yes  Medications reviewed: Yes    Medications: Medication refills not needed today.  Pharmacy name entered into [facility]:    [id number] - [place], [place] - [address]  [facility] [place] [place] [facility] [address] [facility] [place], [place] [facility] [address] [facility]    Clinical concerns: follow up       Sheeba Barnett CMA              "

## 2019-10-18 PROBLEM — E87.1 HYPONATREMIA: Status: ACTIVE | Noted: 2019-01-01

## 2019-10-18 NOTE — PLAN OF CARE
A x O, VSS on RA. Denies pain and nausea. Regular diet. Voiding spont. +Flatus/BM. Phos replaced, labs ordered for the AM. Pt resting between cares. Cont w/ POC.

## 2019-10-18 NOTE — CONSULTS
[facility]  Palliative Care Consultation Note    Patient: Odalys Nathan  Date of Admission:  10/17/2019    Requesting Clinician / Team: Hospitalist  Reason for consult: Symptom management; Goals of care  Patient's outpatient palliative provider: Augustine Lee MD    Recommendations:    Continue with current pain regimen    Patient interested in more information about hospice, but feels not yet ready to consider enrolling    Follow up with Palliative Care after discharge    These recommendations have been discussed with Gyn-Onc.      Thank you for the opportunity to participate in the care of this patient and family. Our team: will continue to follow.     During regular M-F work hours--if you are not sure who specifically to contact--please contact us by sending a text page to our team consult pager at [phone].    After regular work hours and on weekends/holidays, you can call our answering service at [phone]. Also, who's on call for us is available in [employer].       Assessments:  Odalys Nathan is a 59 year old female with a history of recurrent metastatic ovarian cancer first diagnosed in 2012 s/p multiple rounds of chemotherapy and surgical excisions who was admitted to the hospital from Gyn/Onc clinic for electrolyte abnormalities and malnutrition on 10/17/19. The subject of her visit was to restart palliative chemotherapy. Of note, she has had a number of recent complications from her cancer. She underwent ERCP with biliary stent placement on 8/23/19 for palliation of cholestatic symptoms and upper abdominal pain and subsequently developed possible cholangitis with Klebsiella pneumoniae bacteremia for which she was hospitalized from 9/9/19 to 9/14/19. She remains on daily Augmentin for prophylaxis of further infection. She had gamma knife excision of a brain metastasis on 9/18/19. Additionally, she had right ureteral stent placed 10/4/19 for obstructive  "hydronephrosis secondary to increased tumor burden.    Pain:   Today Odalys feels that her pain is well controlled. She had been having diffuse abdominal pain which has resolved now. She is happy with the control of her pain at this time. She is taking MS Contin 12 mg PO q12h and Oxycodone 5-10 mg PO q3h, though she has not needed her short acting opioids.     Appetite:  The patient has had an unintentional 25 pound weight loss since April 2019. She says that her appetite has been very poor. Often she does not feel hungry, and the taste and textures of different foods can suddenly become very unappealing. She will have small amounts of food at a time and sometimes can drink part of a Boost. She is currently prescribed Marinol for appetite. Odalys states that her  is very vigilant about her appetite, and she sometimes feels pressured to eat but cannot.   She eats small portions at a time to avoid nausea and goes by a schedule rather than waiting to feel hungry.   She is going to start medical cannabis, which her  is picking up at the dispensary today.     Bowels:  She had constipation after starting opioids, but this has improved with an appropriate bowel regimen.     Goals of Care:  Odalys has an appropriate understanding of the prognosis of her illness, though she tries not to think of the ultimate outcome. Odalys states that living and \"fighting\" are important to her. She thinks that she \"might fight better if she doesn't have a give up attitude\" and wants to continue enjoying life for as long as she can. At this time, she does not feel ready to switch to hospice care. She is familiar with hospice from her experience with her mother.  We discuss her wish to fight and her goal to enjoy life and that sometimes fighting for a good quality of life is more important/appropriate than fighting the cancer. She is interested in getting more information about hospice in the context of being able to make an " informed decision in regards to future chemo. She does expect she will continue to choose chemo. She'd like to discuss all of this with Marcos.       Today, the patient was seen for:  Recurrent metastatic ovarian cancer  Symptom management and discussion of goals of care.     Prognosis, Goals, & Planning:      Functional Status just prior to hospitalization: 1 (Restricted in physically strenuous activity but ambulatory and able to carry out work of a light or sedentary nature)      Prognosis, Goals, and/or Advance Care Planning were addressed today: Yes as above      Patient's decision making preferences: shared with support from loved ones      Patient has decision-making capacity today for complex decisions: Yes      I have concerns about the patient/family's health literacy today: No      Patient has a completed Health Care Directive: Yes, and on file.      Code status: full code    Coping, Meaning, & Spirituality:   Mood, coping, and/or meaning in the context of serious illness were addressed today: Yes    Social:     Living situation: in community with her . her sister spent the past month with them for support    Key family / caregivers:     History of Present Illness:  Odalys Nathan is a 59 year old female with a history of For further detail, see assessment above.      Key Palliative Symptom Data:  # Pain severity the last 12 hours: low  # Dyspnea severity the last 12 hours: none  # Nausea severity the last 12 hours: low  # Anxiety severity the last 12 hours: low    Patient is on opioids: assessed and bowels ok/no needed changes to plan of care today.    ROS:  Comprehensive ROS is reviewed and is negative except as here & per HPI:      Past Medical History:  Past Medical History:   Diagnosis Date     Antiplatelet or antithrombotic long-term use      Ascites      Blood clot in the legs      Diabetes (H)      Ovarian cancer (H)     serous,stg IV     Pleural effusion      Pulmonary embolism (H)  2/2012     Refusal of blood transfusions as patient is Bahai      Short gut syndrome      Subclinical hypothyroidism 4/18/2013     Thrombosis of leg         Past Surgical History:  Past Surgical History:   Procedure Laterality Date     COLECTOMY       COLONOSCOPY  2/1/2012    Procedure:COLONOSCOPY; With Biopsy; Surgeon:TONI SULLIVAN; [facility]     CYSTOSCOPY, RETROGRADES, INSERT STENT URETER(S), COMBINED Right 10/4/2019    Procedure: CYSTOSCOPY, WITH RETROGRADE PYELOGRAM AND URETERAL STENT INSERTION;  Surgeon: Wolf Gan MD;  [facility]     ENDOSCOPIC RETROGRADE CHOLANGIOPANCREATOGRAM N/A 8/23/2019    Procedure: Endoscopic Retrograde Cholangiopancreatogram with 4 mm Hurricane Balloon Dilation, gallbladder stent and Bile Duct stent placements, Bilarry Sphincterotomy ;  Surgeon: Catrachito Lloyd MD;  [facility]: [facility]     HYSTERECTOMY TOTAL ABD, RHIANNA SALPINGO-OOPHORECTOMY, NODE DISSECTION, TUMOR DEBULKING, COMBINED  2/1/2012    Procedure:COMBINED HYSTERECTOMY TOTAL ABDOMINAL, BILATERAL SALPINGO-OOPHORECTOMY, NODE DISSECTION, TUMOR DEBULKING;  Exploratory Laparotomy, Total Abdominal Hysterectomy, Bilateral Salpingo-Oophorectomy, appendectomy,lysis of adhesions, ileal, ascending, transverse and splenic flexure resection, ileal descending bowel renanastomosis, incidental cystotomy repair, CUSA procedure and colonoscopy ; Curtis     INSERT PORT PERITONEAL ACCESS  4/3/2012    Procedure:INSERT PORT PERITONEAL ACCESS; Intraperitoneal Port Placement (c-arm); Surgeon:SAMUEL CARRASCO; [facility]     INSERT PORT PERITONEAL ACCESS  5/14/2014    Procedure: INSERT PORT PERITONEAL ACCESS;  Surgeon: Nga Yeung MD;  [facility]: [facility]     INSERT PORT VASCULAR ACCESS       IR PORT REMOVAL RIGHT  9/20/2019     LAPAROSCOPY DIAGNOSTIC (GYN)  5/14/2014    Procedure: LAPAROSCOPY DIAGNOSTIC (GYN);  Surgeon: Nga Yeung MD;  [facility]     LAPAROTOMY EXPLORATORY Right 11/25/2015     Procedure: LAPAROTOMY EXPLORATORY;  Surgeon: Nga Yeung MD;  [facility]     LAPAROTOMY, TUMOR DEBULKING, COMBINED  5/14/2014    Procedure: COMBINED LAPAROTOMY, TUMOR DEBULKING;  Surgeon: Nga Yeung MD;  [facility]     OPTICAL TRACKING SYSTEM CRANIOTOMY, EXCISE TUMOR, COMBINED Right 9/18/2019    Procedure: Stealth Assisted Right Craniotomy And Tumor Resection;  Surgeon: Bertin Dewey MD;  [facility]     PICC INSERTION Left 09/20/2019    5Fr - 46cm (4cm external), basilic vein, low SVC     REMOVE CATHETER PERITONEAL N/A 8/20/2014    Procedure: REMOVE CATHETER PERITONEAL;  Surgeon: Nga Yeung MD;  [facility]     VASCULAR SURGERY      stent left iliac vein         Family History:  Family History   Problem Relation Age of Onset     Cancer Mother 69        lung, smoker     Cancer Maternal Uncle 65        brain     Colon Cancer Maternal Aunt 80        colon       Allergies:  No Known Allergies     Medications:  I have reviewed this patient's medication profile and medications from this hospitalization.   Noted meds are:    MS Contin 15 mg PO q12h  Oxycodone 5-10 mg PO q3h  Marinol 2.5 mg PO every day  Magnesium oxide 400 mg PO bid  Ondansetron 5 mg ODT  Miralax daily    Physical Exam:  Vital Signs: Temp: 98.2  F (36.8  C) Temp src: Oral BP: 102/55   Heart Rate: 88 Resp: 15 SpO2: 95 % O2 Device: None (Room air)    Weight: 120 lbs 12.8 oz    General: chronically ill-appearing adult woman sitting up in bed, no acute distress  HENT: temporal wasting, MMM  Cardiovascular: no cyanosis  Respiratory: no respiratory distress, saturating well on room air  Abdomen: no distension  Skin: No rashes  Psych: responds to questions appropriately, good insight    Data reviewed:  Recent imaging reviewed, my comments on pertinents:       Recent lab data reviewed, my comments on pertinents:   Reflect worsening nutritional status, electrolyte derangements. Apparent worsening of tumor  burden with increased CA-125.     Na: 126>131  K: 3.0  Cr: 0.58  Albumin: 2.0  Alk Phos: 970  CA-125: 1993      Victoria Gilliam  Pager:  [phone]    Total time spent was 75 minutes, additional time reviewing chart and documentation, >50% of time was spent counseling and/or coordination of care regarding treatment preferences.

## 2019-10-18 NOTE — PROGRESS NOTES
CLINICAL NUTRITION SERVICES - ASSESSMENT NOTE     Nutrition Prescription    RECOMMENDATIONS FOR MDs/PROVIDERS TO ORDER:  If calorie counts indicating PO intake of <60% of nutrient needs (<1100 kcal/d, <40 g protein/d), consider TF if aligns with goals of care    Malnutrition Status:    Severe malnutrition in the context of chronic disease and illness.     Recommendations already ordered by Registered Dietitian (RD):  Ordered boost Plus (chocolate) BID between meals.  Ordered Calorie counts    Future/Additional Recommendations:  1. Continue current regular diet at this time    2. If pt continues having poor PO intake, suggest TF if aligns with the goals of care.  If plan to begin TF, please consult Registered Dietitian to order TF.  Would recommend   Goal of Isosource 1.5 @ goal 50 ml/hr (1200 ml/day) to provide 1880 kcals (33 kcal/kg/day), 82 g PRO (1.5 g/kg/day), 912 ml free H2O, 211 g CHO and 18 g Fiber daily.  - Initiate @ 20 ml/hr and advance by 10 ml q8hr as tolerated pending pt's tolerance  - Do not start or advance until lytes (Mg++,K+) WNL and phos>1.9   - Recommend 30 mL q4hr fluid flushes for tube patency. Additional fluids and/or adjustments per MD.       REASON FOR ASSESSMENT  Odalys Nathan is a/an 59 year old female assessed by the dietitian for Provider Order - Failure to thrive, hx widely metastatic ovarian cancer; Admission Nutrition Risk Screen for reduced oral intake over the last month    NUTRITION HISTORY  -Per MD's note, pt is admitted on 10/17 for recurrent ovarian cancer with mets to the liver, brain, retroperitoneal and mesenteric adenopathy, electrolyte abnormalities, diarrhea, severe malnutrition, poor PO intake and failure to thrive. Pt stated feeling hungry, but altered food taste. Having prescription of medical marijuana, but haven't taken it. She mentioned food only tastes like sugar or salt. Pt was on Miralax to help with bowel movement. If stopped using Miralax, pt would have  constipation.   -Per imaging on 10/15, pt has increasing pleural effusions, small on the left and moderate to large on the right. Right basilar atelectasis/consolidation.   -Per pt, she tried to eat as much as possible at home, but not successful. Pt complained about altered taste and early satiety after few bites. Pt had small portion food at home with half cup of cereal, pudding, chocolate Boost Plus. Pt has noticed altered taste and early satiety from few months ago (pt could not recall how long).     CURRENT NUTRITION ORDERS  Diet: 2000 mL Fluid Restriction and Regular, restricting free water to 1L/day  Intake/Tolerance:   -No recorded food intake and bowel movement.   -When visiting pt, pt had a tray with muffin, rice and red bean, chocolate Boost Plus, and rice crispy bar.   -Per pt, no nausea, vomiting, or abdominal pain. Bowel movement was not regular. Pt felt hungry, tried to eat even with altered taste.     LABS  Labs reviewed  Sodium 131 (L), BUN 4 (trending L), Alk Phos 825 (H)    MEDICATIONS  Medications reviewed  Zofran, Miralax, Marinol    ANTHROPOMETRICS  Height: 1.651 m (5'5'')  Most Recent Weight: 54.8 kg (120 lb 12.8 oz)    IBW: 56.82 kg  BMI: 20.1 kg/m2 Normal BMI  Weight History:  Wt has been stable in past few weeks. Wt trending down over past few months. 6.5 kg (11%) wt loss since 06/20/19  Wt Readings from Last 10 Encounters:   10/17/19 54.8 kg (120 lb 12.8 oz)   10/17/19 53.8 kg (118 lb 8 oz)   10/15/19 54.7 kg (120 lb 8 oz)   10/10/19 54.9 kg (121 lb)   10/09/19 55 kg (121 lb 4.8 oz)   10/03/19 53.4 kg (117 lb 11.6 oz)   10/03/19 53.2 kg (117 lb 4.8 oz)   09/24/19 53.5 kg (118 lb)   09/19/19 52.6 kg (115 lb 15.4 oz)   09/11/19 60.9 kg (134 lb 3.2 oz)   08/15/19 60.6 kg (133 lb 8 oz)   07/15/19 61.3 kg (135 lb 2 oz)   06/20/19 62 kg (136 lb 9.6 oz)       Dosing Weight: 55 kg (based on lowest wt of 54.8 kg on 10/17)    ASSESSED NUTRITION NEEDS  Estimated Energy Needs: 0287-2969 kcals/day (30  - 35 kcals/kg )  Justification: Repletion  Estimated Protein Needs: 66-83 grams protein/day (1.2 - 1.5 grams of pro/kg)  Justification: Repletion  Estimated Fluid Needs: 2000 mL fluid restriction, per primary team  Justification: Fluid restriction    PHYSICAL FINDINGS  See malnutrition section below.     MALNUTRITION  % Intake: </=50% for >/= 1 week (severe)  % Weight Loss: > 7.5% in 3 months (severe)  Subcutaneous Fat Loss: Facial region:  mild and Upper arm:  mild  Muscle Loss: Temporal:  Mild to moderate, Facial & jaw region:  Mild to moderate, Thoracic region (clavicle):  Moderate to severe, Upper arm (bicep, tricep):  Moderate to severe, Lower arm  (forearm):  moderate, Upper leg (quadricep, hamstring):  moderate and Posterior calf:  Moderate to Severe.   Fluid Accumulation/Edema: None noted  Malnutrition Diagnosis: Severe malnutrition in the context of chronic disease and illness.     NUTRITION DIAGNOSIS  Inadequate oral intake related to altered taste and early satiety as evidenced by </=50% food intake for >/= 1 week, 6.5 kg (11%) wt loss since 06/20/19, mild to severe fat and muscle loss.     INTERVENTIONS  Implementation  Nutrition Education: Provided education on supplements   Provided handouts: Supplements, Coping with taste change.    Medical food supplement therapy   - Boost Plus (chocolate) BID between meals.    Goals  Total avg nutritional intake to meet a minimum of 30 kcal/kg and 1.2 g PRO/kg daily (per dosing wt 55 kg).     Monitoring/Evaluation  Progress toward goals will be monitored and evaluated per protocol.    Abby Choudhury  Dietetic Intern    I have read and agree with the above nutrition note, recs, and interventions  Jeimy Kiser RD, LD  7C RD pager: [phone]

## 2019-10-18 NOTE — PLAN OF CARE
VSS on RA. Complains of generalized dyspnea and SOB with exertion; neb administered with some relief. Denies pain. On regular diet with 2L free water fluid restriction. Reports passing flatus/BM. Voiding with good output. Up ad perez. Plan for thoracentesis tomorrow, continue monitoring electrolytes.     OBSERVATIONS GOALS:  1. Thoracentesis completed -goal not met  2. Electrolyte replacement, Na >134 - goal not met, Na 131

## 2019-10-18 NOTE — UTILIZATION REVIEW
"Admission Status; Secondary Review Determination     Under the authority of the Utilization Management Committee, the utilization review process indicated a secondary review on the above patient.  The review outcome is based on review of the medical records, discussions with staff, and applying clinical experience noted on the date of the review.          (x) Observation Status Appropriate - This patient does not meet hospital inpatient criteria and is placed in observation status. If this patient's primary payer is Medicare and was admitted as an inpatient, Condition Code 44 should be used and patient status changed to \"observation\".     RATIONALE FOR DETERMINATION   60 yo female with recurrent ovarian cancer with widely metastatic disease admitted for hyponatremia, other electrolyte abnormalities, generalized weakness and failure to thrive. Responding to IV fluids. Hyponatremia appears chronic. On home oral pain med regimen.    The severity of illness, intensity of service provided, expected LOS and risk for adverse outcome make the care appropriate for further observation; however, doesn't meet criteria for hospital inpatient admission. Dr. Lennon notified of this determination.    This document was produced using voice recognition software.      The information on this document is developed by the utilization review team in order for the business office to ensure compliance.  This only denotes the appropriateness of proper admission status and does not reflect the quality of care rendered.         The definitions of Inpatient Status and Observation Status used in making the determination above are those provided in the CMS Coverage Manual, Chapter 1 and Chapter 6, section 70.4.      Sincerely,  Nga Farnsworth MD  Utilization Review  Physician Advisor  [facility].  "

## 2019-10-18 NOTE — PROGRESS NOTES
OBSERVATIONS GOALS:  1. Thoracentesis completed -goal not met  2. Electrolyte replacement, Na >134 - goal not met

## 2019-10-18 NOTE — PROGRESS NOTES
Gynecology Oncology Progress Note           24 hour events:   - Started on normal saline for correction of hyponatremia   - Electrolyte replacement protocol ordered   - Electrolytes normalizing: Na 131, K 4.0, Mg 1.7, Ph 3.2    Subjective:   Patient is feeling okay this morning. She does note some dyspnea at baseline as well as worsening SOB with ambulation. She was able to tolerate PO intake overnight with no nausea. Her pain is controlled. She is passing flatus and having BM . She is voiding spontaneously. She is ambulating without issue.     Objective:   Vitals:    10/17/19 1425 10/17/19 2234   BP: 95/48 98/53   BP Location: Right arm Right arm   Resp: 16 16   Temp: 98.4  F (36.9  C) 97  F (36.1  C)   TempSrc: Oral Oral   SpO2: 96% 94%   Weight: 54.8 kg (120 lb 12.8 oz)      EXAM:   General: appears comfortable, in NAD  CV: RRR, +S1S2, no S3S4, no MRGT  Resp: CTAB, though significantly decreased breath sounds RLL, mild increased work of breathing  Abdomen: soft, non tender, nondistended  Extremities: nontender, no edema, SCDs in place    I/O last 3 completed shifts:  In: 778.33 [P.O.:360; I.V.:418.33]  Out: -     Labs/Imaging:  AM CBC, BMP, Mg, Ph    Assessment: Odalys Nathan is a 60 yo HD#2 admitted for electrolyte abnormalities, weakness, poor PO intake, failure to thrive.    Plan:    Dz: H/o stage IIIC ovarian cancer w/ recurrence w/ mets to liver and brain s/p single agent gemzar 8/2019. C/b klebsiella bacteremia likely 2/2 biliary stent placement. S/p R craniotomy w/ tumor resection & gamma knife (9/18/19)    FEN: Regular diet. Nutrition consulted. PTA marinol. NS @100 ml/hr. Na 126>127>128>128>131. ERP for K 3.0>3.8>4.0, Ph 2.1>3.2.   Pain: PTA MS Contin, PRN oxycodone Palliative consulted  Heme: Jehovah witness. Hgb 11.8. Hx DVT/PE 2012, no current anticoagulation.  CV: NI  Pulm: PTA PRN nebulizer. Progression of disease with R>L pleural effusions. STAT CXR ordered. Likely large right pleural  effusion. Will plan to consult procedural medicine team for possible thoracentesis   GI: Alk Phos 970. Biliary stent in place. Oral candidal infxn PTA clotrimazole. C Diff neg   : UA + blood, WBC 6. Right ureteral stent in place. PTA flomax  ID: PTA Augmentin [id number] BID for Klebsiella pneumoniae bacteremia (9/10) continue until ERCP.   Endo: Hypothyroidism. PTA synthroid   Psych/Neuro/MSK: S/p R craniotomy w/ tumor resection & gamma knife (9/18/19)  PPX: SCDs, IS   Drains/Lines: PIV   Disposition: home when electrolyte abnormalities corrected & tolerating PO intake       Zoraida Lennon MD   OB/GYN Resident PGY-3  Pager [phone]    Provider Disclosure:   I agree with above History, Review of Systems, Physical exam and Plan. I have reviewed the content of the documentation and have edited it as needed. I have seen and personally performed the services documented here and the documentation accurately represents those services and the decisions I have made.     Electronically signed by:   Garry Riggins MD   Gynecologic Oncology   [facility]

## 2019-10-18 NOTE — PLAN OF CARE
Assumed care of pt from 5915-0824. HR tachy at times, low 100s. OVSS on RA. Pt takes sched MS contin for gen pain, no current pain but likes to stay on top. Regular diet, denies nausea. Pt takes miralax 2x/day, despite loose stools, re-ordered for pt. C diff was negative. +gas. Voiding spont. UAL. PIV infusing MIVF. Needs phos replacement, ordered from pharmacy. Other electrolytes WNL per protocols. Lovenox education started. Cont to monitor HR and electrolytes. Cont w/ POC.

## 2019-10-18 NOTE — PROGRESS NOTES
OBSERVATIONS GOALS:  1. Thoracentesis completed -goal not met  2. Electrolyte replacement, Na >134 - goal not met, Na 131

## 2019-10-19 PROBLEM — E87.8 ELECTROLYTE ABNORMALITY: Status: ACTIVE | Noted: 2019-01-01

## 2019-10-19 NOTE — PROGRESS NOTES
Gynecology Oncology Progress Note         24 hour events:   - Right thoracentesis with drainage of large volume clear yellow fluid  - IV fluids discontinued  - Prophylactic Lovenox started    Subjective:   Patient is feeling well this morning, no complaints overnight. Shortness of breath is significantly improved after thoracentesis yesterday. Tolerating Boost and small amounts of regular diet without N/V. Her pain is controlled. Passing flatus, voiding spontaneously.    Objective:   Vitals:    10/19/19 0724 10/19/19 0849 10/19/19 1602 10/19/19 2240   BP: 93/45  95/52 98/47   BP Location:   Right arm Right arm   Pulse:       Resp: 16  16 15   Temp: 96.7  F (35.9  C)  98.4  F (36.9  C) 96.5  F (35.8  C)   TempSrc: Oral  Oral Axillary   SpO2: 93% 94% 97% 96%   Weight:         EXAM:   General: Appears comfortable, in NAD  CV: RRR, well perfused  Resp: CTAB, no increased work of breathing  Abdomen: Soft, non tender, nondistended  Extremities: Nontender, no edema    I/O last 3 completed shifts:  In: 1461.67 [P.O.:660; I.V.:801.67]  Out: 250 [Urine:250]    Labs/Imaging:  Na 126>>128>>133>134>134>132  K 3.0>>4.0>3.6>4.4>4.3>>4.2  Ph 2.1>3.2>2.5>2.7  Mg 1.3    Assessment: Odalys Nathan is a 60 yo HD#4 admitted for electrolyte abnormalities, weakness, poor PO intake, failure to thrive. Now with borderline hyponatremia, hypomagnesemia, working on improving PO intake.    Plan:    Dz: H/o stage IIIC ovarian cancer w/ recurrence w/ mets to liver and brain s/p single agent gemzar 8/2019. C/b klebsiella bacteremia likely 2/2 biliary stent placement. S/p R craniotomy w/ tumor resection & gamma knife (9/18/19)    FEN: Regular diet. Nutrition consulted, recommend calorie counts and Boost stupplements. PTA marinol. Hyponatremia, now resolved. ERP for hypomagnesemia, hypokalemia and hypophosphatemia prn.  Pain: PTA MS Contin, PRN oxycodone Palliative consulted, continue with current pain regimen  Heme: Yazidi. Hgb  11.8. Hx DVT/PE 2012, no current anticoagulation.  CV: NI  Pulm: PTA PRN nebulizer. Progression of disease with R>L pleural effusions. Large right pleural effusion now s/p thoracentesis with 2180cc clear yellow fluid drained. Symptomatically improved  GI: Alk Phos 970>825. Biliary stent in place. Oral candidal infection, continue PTA clotrimazole. C Diff neg   : UA + blood, WBC 5.4. Right ureteral stent in place. PTA flomax  ID: PTA Augmentin 875 BID for Klebsiella pneumoniae bacteremia (9/10), continue until ERCP in November  Endo: Hypothyroidism, PTA synthroid   Psych/Neuro/MSK: S/p R craniotomy w/ tumor resection & gamma knife (9/18/19). No neurologic deficits.  PPX: SCDs, IS, Lovenox   Drains/Lines: PIV   Disposition: Possibly today pending nutrition evaluation, improved PO intake    Maria Teresa Gibbons MD  Ob/Gyn Resident, PGY-2  10/20/19 7:28 AM     Provider Disclosure:   I agree with above History, Review of Systems, Physical exam and Plan. I have reviewed the content of the documentation and have edited it as needed. I have seen and personally performed the services documented here and the documentation accurately represents those services and the decisions I have made.     Electronically signed by:   Garry Riggins MD   Gynecologic Oncology   [facility]

## 2019-10-19 NOTE — PLAN OF CARE
Slightly hypotensive, otherwise VSS on RA. Denies pain. Right thoracentesis performed at bedside today by MD, pt tolerated well. Reports much improvement in breathing ability and denies SOB since. On regular diet/calorie counts, reports poor appetite but denies nausea. On free water fluid restriction. Labs WNL this AM. Continue monitoring labs and nutritional intake.

## 2019-10-19 NOTE — PROGRESS NOTES
OBSERVATION GOALS:   1. Meeting calorie/protein goals of 1100cal/40g protein per day - GOAL NOT MET

## 2019-10-19 NOTE — PLAN OF CARE
BP 96/45   Pulse 98   Temp 97.8  F (36.6  C) (Oral)   Resp 16   Wt 54.8 kg (120 lb 12.8 oz)   SpO2 96%   BMI 20.10 kg/m   RA.Pt was changed from obs to inpatient during the evening and was very glad.Taking scheduled MS contin for pain every 12 hrs and IV NS with 20 meq KCL at 100 ml hr.Dumping urine and reminded to save,Drinking ok and denied nausea. Up ad perez to bathroom.Had loose stool earlier but not this evening.Will continue POC.

## 2019-10-19 NOTE — PROCEDURES
Procedure Note    Attending: Grecia Last MD   Procedure: Right Thoracentesis  Indication: GROVES  Risk Assessment: low  Pre-procedure diagnosis:plerual effusion  Post-procedure diagnosis: same    The risks and benefits of the procedure were explained to Odalys who expressed understanding and opted to proceed.  Consent was obtained and placed in the chart and the patient was placed on pulse oximetry.  A time out was performed.    An ultrasound probe was used to identify an area of pleural fluid in the rt hemithorax.  This area was prepped and draped in the usual sterile fashion.  7 ml of 1% lidocaine was instilled and fluid located using ultrasound guidance.  A small incision was made with an 11 blade.  The thoracentesis catheter and needle were inserted above the rib until fluid obtained then the needle removed.    Using a one-way valve, 2180 ml of clear bright yellow colored fluid was removed. A specimen was sent for analysis.    The catheter was removed with the patient exhaling and an occlusive dressing placed.       A chest radiograph is pending.    The tolerated the procedure well.  Please contact the Consult and Procedure Service if any concerns or complications arise.       Grecia Last MD     DOS:  10/19/2019

## 2019-10-19 NOTE — PROGRESS NOTES
OBSERVATION GOALS:  1. Meeting calorie/protein goals of 1100cal/40g protein per day - GOAL NOT MET.

## 2019-10-19 NOTE — PLAN OF CARE
Neuro: A&Ox4.   Cardiac: VSS.   Respiratory: Sating WNL on RA.  GI/: Adequate urine output. No BM  Diet/appetite: Regular diet.   Activity:  Up with SBA  Pain: Denies  Skin: No new deficits noted.  LDA's: PIV with 0.9NS+ 20 meq KCL at 100 ml/hr.    Plan: Continue with POC. Notify primary team with changes.

## 2019-10-19 NOTE — PROGRESS NOTES
"Gynecology Oncology Progress Note           24 hour events:   - Hyponatremia resolved   - Nutrition consulted, recommend calorie counts and boost BID between meals  - Palliative care consulted, given more information about hospice but not yet ready to consider enrolling. Recommend follow up with Palliative care after discharge.    Subjective:   Patient is feeling okay this morning. She reports \"rattling\" with her breathing and that she gets out of breath with ambulation. Tolerating small amounts of PO without N/V. Had a boost supplement yesterday. Her pain is controlled. She is wondering when she will discharge.    Objective:   Vitals:    10/18/19 1516 10/18/19 2018 10/18/19 2315 10/18/19 2318   BP: 100/50 100/51 (!) 88/51 96/45   BP Location: Right arm  Right arm    Pulse: 94 98     Resp: 16 16 16    Temp: 96.7  F (35.9  C) 97.6  F (36.4  C) 97.8  F (36.6  C)    TempSrc: Axillary Oral Oral    SpO2: 96% 95% 96%    Weight:         EXAM:   General: appears comfortable, in NAD  CV: RRR, well perfused  Resp: CTAB, though decreased breath sounds RLL; no increased work of breathing  Abdomen: soft, non tender, nondistended  Extremities: nontender, no edema,    I/O last 3 completed shifts:  In: 3140.67 [P.O.:960; I.V.:2180.67]  Out: 250 [Urine:250]    Labs/Imaging:  Na 126>127>128>128>131>133  K 3.0>3.8>4.0  Ph 2.1>3.2>2.5  INR 1.13    Assessment: Odalys Nathan is a 60 yo HD#3 admitted for electrolyte abnormalities, weakness, poor PO intake, failure to thrive. Now with resolution of electrolyte derangements, planning thoracentesis today    Plan:    Dz: H/o stage IIIC ovarian cancer w/ recurrence w/ mets to liver and brain s/p single agent gemzar 8/2019. C/b klebsiella bacteremia likely 2/2 biliary stent placement. S/p R craniotomy w/ tumor resection & gamma knife (9/18/19)    FEN: Regular diet. Nutrition consulted, recommend calorie counts and Boost stupplements. PTA marinol. Hyponatremia, now resolved. Will " discontinue NS @100 ml/hr this AM. ERP for hypokalemia, hypophosphatemia. Pain: PTA MS Contin, PRN oxycodone Palliative consulted, continue with current pain regimen  Heme: Jehovah witness. Hgb 11.8. Hx DVT/PE 2012, no current anticoagulation.  CV: NI  Pulm: PTA PRN nebulizer. Progression of disease with R>L pleural effusions. Large right pleural effusion. Will plan for thoracentesis with medicine procedure team this afternoon  GI: Alk Phos 970. Biliary stent in place. Oral candidal infection, continue PTA clotrimazole. C Diff neg   : UA + blood, WBC 6. Right ureteral stent in place. PTA flomax  ID: PTA Augmentin 875 BID for Klebsiella pneumoniae bacteremia (9/10) continue until ERCP.   Endo: Hypothyroidism. PTA synthroid   Psych/Neuro/MSK: S/p R craniotomy w/ tumor resection & gamma knife (9/18/19). No neurologic deficits.  PPX: SCDs, IS   Drains/Lines: PIV   Disposition:today vs tomorrow pending improvement in respiratory symptoms after thoracentesis, tolerating PO intake    Patti Ferrera MD  Ob/Gyn PGY-2  October 19, 2019 6:48 AM    Provider Disclosure:   I agree with above History, Review of Systems, Physical exam and Plan. I have reviewed the content of the documentation and have edited it as needed. I have seen and personally performed the services documented here and the documentation accurately represents those services and the decisions I have made.     Electronically signed by:   Garry Riggins MD   Gynecologic Oncology   [facility]

## 2019-10-19 NOTE — UTILIZATION REVIEW
"Admission Status; Secondary Review Determination     Under the authority of the Utilization Management Committee, the utilization review process indicated a secondary review on the above patient.  The review outcome is based on review of the medical records, discussions with staff, and applying clinical experience noted on the date of the review.       (x) Observation Status Appropriate - This patient does not meet hospital inpatient criteria and is placed in observation status. If this patient's primary payer is Medicare and was admitted as an inpatient, Condition Code 44 should be used and patient status changed to \"observation\".     RATIONALE FOR DETERMINATION: 60 yo female with recurrent ovarian cancer with widely metastatic disease admitted for hyponatremia, other electrolyte abnormalities, generalized weakness and failure to thrive. Responding to IV fluids. Hyponatremia improved by 10/18/2019. On home oral pain med regimen. Therapeutic thoracentesis appropriate while managing electrolyte abnormalities and observation services. Reviewed with gyne/oncology team.          The severity of illness, intensity of service provided, expected LOS and risk for adverse outcome make the care appropriate for further observation; however, doesn't meet criteria for hospital inpatient admission. This was discussed with attending physician who concurred with this determination.    The information on this document is developed by the utilization review team in order for the business office to ensure compliance.  This only denotes the appropriateness of proper admission status and does not reflect the quality of care rendered.         The definitions of Inpatient Status and Observation Status used in making the determination above are those provided in the CMS Coverage Manual, Chapter 1 and Chapter 6, section 70.4.      Sincerely,     Brodie Bashir MD    Physician Advisor  Utilization Review/ Case Management  [facility] " Services.

## 2019-10-20 NOTE — PROGRESS NOTES
Calorie Count  Intake recorded for: 10/19  Total Kcals: 947 Total Protein: 31g  Kcals from Hospital Food: 947  Protein: 31g  Kcals from Outside Food (average):0 Protein: 0g  # Meals Recorded: 2 meals (First - 100% apple juice, 25% peanut butter and jelly sandwich)      (Second - 100% fruit cup, 50% applesauce, 25% beef, mashed potatoes)  # Supplements Recorded: 100% 1.5 Boost Plus

## 2019-10-20 NOTE — PLAN OF CARE
Meeting calorie/protein goals of 1100cal/40g protein per day. No.  Patient alert and oriented, labs wnl. Denies pain. Ate 1/4 amount of her meal at supper time.

## 2019-10-20 NOTE — PLAN OF CARE
Discharge  D: Orders for discharge and outpatient medications written.   I: Home medications and return to clinic schedule reviewed with patient. Discharge instructions and parameters for calling Health Care Provider reviewed with teach back. Patient left at 12:45 PM accompanied by her spouse.   A: Patient verbalized understanding and was ready for discharge.   P: Follow up as scheduled.

## 2019-10-20 NOTE — PLAN OF CARE
AVSS. Denies pain and nausea. Voiding and passing gas. Up ad perez in room. Had sips of Boost shake overnight. Continue to monitor labs and PO intake.    Obs Note:  Meeting calorie/protein goals of 1100cal/40g protein per day - GOAL NOT MET

## 2019-10-21 NOTE — PROGRESS NOTES
"Care Coordinator Note  Post hospital call.  \"It feels good to be home.\"  \" I need to work on eating but I am just not hungry.\"  \" My pain is under control now.\"  Requested appt at [facility] to have lab work drawn on the 22nd.    Odalys will watch for it on [facility].   Questions she has \" When  Can I stop the antibiotic?\"  Regarding chemotherapy. \"  I am not up for it yet.. \"   Will follow up with Dr Yeung on Thursday regarding antibiotic and port placement and chemo.     Patient verbalized back understanding of the above information discussed.   Tammy CISNEROS RN, OCN  Care Coordinator   Gynecologic Cancer   Office [phone]    "

## 2019-10-22 NOTE — PROGRESS NOTES
Medical Assistant Note:  Odalys Nathan presents today for blood draw.    Patient seen by provider today: No.   present during visit today: Not Applicable.    Concerns: No Concerns.    Procedure:  Lab draw site: Right antecub, Needle type: butterly, Gauge: 23.    Post Assessment:  Labs drawn without difficulty: Yes.    Discharge Plan:  Departure Mode: Ambulatory.    Face to Face Time: 10.    Radha Mcdaniels CMA, CMA

## 2019-10-22 NOTE — LETTER
10/22/2019         RE: Odalys Nathan  [address]  [place] [facility] [id number]        Dear Colleague,    Thank you for referring your patient, Odlays Nathan, to the [facility]. Please see a copy of my visit note below.    Medical Assistant Note:  Odalys Nathan presents today for blood draw.    Patient seen by provider today: No.   present during visit today: Not Applicable.    Concerns: No Concerns.    Procedure:  Lab draw site: Right antecub, Needle type: butterly, Gauge: 23.    Post Assessment:  Labs drawn without difficulty: Yes.    Discharge Plan:  Departure Mode: Ambulatory.    Face to Face Time: 10.    Radha Mcdaniels CMA, CMA              Again, thank you for allowing me to participate in the care of your patient.        Sincerely,        [facility] Oncology Nurse

## 2019-10-24 PROBLEM — J90 PLEURAL EFFUSION: Status: ACTIVE | Noted: 2019-01-01

## 2019-10-24 NOTE — TELEPHONE ENCOUNTER
"Pt called in to triage reporting increasing sob and wheezing during inhalation since coming home from the hospital 10/20. State she feels \"fluid in the chest\" and has been taking cough medication to decrease phlegm build up. Denied any coughing, fevers, fatigue, pain, edema or swelling. She did have some chills last night and woke up with difficulty breathing. Took albuterol neb this morning with some relief. Stated she had a thoracentesis in the hospital. Asking if she should be seen. Paged RNCC.  "

## 2019-10-24 NOTE — ED NOTES
[facility]  ED Nurse Handoff Report    Odalys Nathan is a 59 year old female   ED Chief complaint: Shortness of Breath  . ED Diagnosis:   Final diagnoses:   Elevated troponin   Recurrent right pleural effusion   Leukocytosis, unspecified type     Allergies: No Known Allergies    Code Status: Full Code  Activity level - Baseline/Home:  Stand by Assist. Activity Level - Current:   Assist X 1. Lift room needed: No. Bariatric: No   Needed: No   Isolation: No. Infection: Not Applicable.     Vital Signs:   Vitals:    10/24/19 1745 10/24/19 1800 10/24/19 1815 10/24/19 1830   BP: 90/60 (!) 86/57 90/57 (!) 84/50   Pulse: 108 108 107 108   Resp: 30 25 26 27   Temp:       TempSrc:       SpO2: 93% 94% 94% 93%       Cardiac Rhythm:  ,      Pain level: 0-10 Pain Scale: 0  Patient confused: No. Patient Falls Risk: Yes.   Elimination Status: Has voided   Patient Report - Initial Complaint: shortness of breath . Focused Assessment: breathing , resp . ststus    Tests Performed:   Labs Ordered and Resulted from Time of ED Arrival Up to the Time of Departure from the ED   CBC WITH PLATELETS DIFFERENTIAL - Abnormal; Notable for the following components:       Result Value    WBC 19.3 (*)     RBC Count 3.75 (*)     Hemoglobin 11.2 (*)     Hematocrit 33.8 (*)     RDW 16.3 (*)     Absolute Neutrophil 18.1 (*)     Absolute Lymphocytes 0.4 (*)     All other components within normal limits   BASIC METABOLIC PANEL - Abnormal; Notable for the following components:    Sodium 130 (*)     Potassium 3.3 (*)     Glucose 133 (*)     Calcium 8.2 (*)     All other components within normal limits   MAGNESIUM - Abnormal; Notable for the following components:    Magnesium 0.9 (*)     All other components within normal limits   TROPONIN I - Abnormal; Notable for the following components:    Troponin I ES 0.243 (*)     All other components within normal limits   PERIPHERAL IV CATHETER   . Abnormal Results: see above    Treatments provided: iv abx ,  Family Comments: none  OBS brochure/video discussed/provided to patient:  N/A  ED Medications:   Medications   0.9% sodium chloride BOLUS (0 mLs Intravenous Stopped 10/24/19 1609)   magnesium sulfate 2 g in NS intermittent infusion ([employer]ar[employer]ium or [facility]V Cmpd) (0 g Intravenous Stopped 10/24/19 1709)   iopamidol (ISOVUE-370) solution 500 mL (51 mLs Intravenous Given 10/24/19 1641)   CT Scan Flush (73 mLs Intravenous Given 10/24/19 1641)   piperacillin-tazobactam (ZOSYN) intermittent infusion 4.5 g (0 g Intravenous Stopped 10/24/19 1836)     Drips infusing:  No  For the majority of the shift, the patient's behavior Green. Interventions performed were labs , imaging iv abx.     Severe Sepsis OR Septic Shock Diagnosis Present: No  Was at [facility] for brain tumor resection, history of thoracentisis, pleasant , weak       ED Nurse Name/Phone Number: Betzy Chan RN,   6:40 PM  RECEIVING UNIT ED HANDOFF REVIEW    Above ED Nurse Handoff Report was reviewed: Yes  Reviewed by: Alise Lebron RN on October 24, 2019 at 8:07 PM

## 2019-10-24 NOTE — ED TRIAGE NOTES
Pt woke up last night and with worsening SOB, recent right thoracentesis last week, approximately 1L of fluid was removed. Brain tumor removed 1 month ago at [facility].

## 2019-10-24 NOTE — ED PROVIDER NOTES
History     Chief Complaint:  Shortness of Breath      HPI   Odalys Nathan is a 59 year old female with past medical history metastatic ovarian cancer with recent admission for pleural effusion, failure to thrive, electrolyte abnormalities who presents with chief complaint shortness of breath.  Patient was just discharged from the hospital 3 days ago.  She has been feeling well, however in the last 2 days, she began feeling increasingly short of breath.  She denies fevers or chills.  She is on Augmentin currently as she has a ureteral stent in place.  She reports a mild cough.  Denies chest pain.    Allergies:  No Known Drug Allergies    Medications:    Albuterol inhaler  Augmentin   Clotrimazole   Lomotil  Marinol  Diflucan  Duoneb   Levothyroxine  Imodium  Ativan  Morphine  Narcan  Roxicodone   Compazine       Past Medical History:    Ascites  Blood clot  Diabetes  Ovarian cancre  Pleural effusion  PE  Short gun syndrome  Hypothyroidism  Partial small bowel obstruction  Metastatic cancer  Brain tumor   Stricture or kinking of ureter      Past Surgical History:    Colectomy  Colonoscopy  Endoscopic retrograde cholangiopancreatogram   Total hysterectomy, bilateral salpingo-oophorectomy, node dissection, tumor debulking, combined  Insert port peritoneal access  Insert port vascular access  IR port removal right  Laparoscopy diagnostic (Gyn)  Laparotomy exploratory   Laparotomy, tumor debulking, combined  Optical tracking system craniotomy, excise tumor, combined  PICC insertion   Remove catheter peritoneal   Left iliac vein stent       Family History:    Lung cancer      Social History:  Presents with sister.  Former smoker, 8 years, quit 6/21/1980.  Positive for alcohol use.    Marital Status:      Review of Systems   Constitutional: Positive for fatigue. Negative for chills and fever.   Respiratory: Positive for cough and shortness of breath. Negative for wheezing.    Cardiovascular: Negative for  chest pain and leg swelling.   Gastrointestinal: Positive for nausea. Negative for abdominal pain and vomiting.   Skin: Negative for rash.   All other systems reviewed and are negative.        Physical Exam     Patient Vitals for the past 24 hrs:   BP Temp Temp src Pulse Heart Rate Resp SpO2   10/24/19 1830 (!) 84/50 -- -- 108 110 27 93 %   10/24/19 1815 90/57 -- -- 107 108 26 94 %   10/24/19 1800 (!) 86/57 -- -- 108 108 25 94 %   10/24/19 1745 90/60 -- -- 108 108 30 93 %   10/24/19 1730 91/54 -- -- 109 109 30 94 %   10/24/19 1720 (!) 84/61 -- -- -- 110 21 93 %   10/24/19 1709 (!) 89/59 -- -- 109 -- 24 93 %   10/24/19 1600 96/58 -- -- 112 112 23 93 %   10/24/19 1515 (!) 86/53 -- -- 114 115 28 95 %   10/24/19 1500 (!) 86/52 -- -- 117 120 20 93 %   10/24/19 1445 90/60 -- -- 118 116 29 92 %   10/24/19 1430 -- -- -- -- 117 24 96 %   10/24/19 1415 -- -- -- -- 123 24 93 %   10/24/19 1403 91/61 97.9  F (36.6  C) Oral 123 -- 22 98 %         Physical Exam    Nursing note and vitals reviewed.  Constitutional: Cooperative.   HENT:   Mouth/Throat: Moist mucous membranes.   Eyes: EOMI, nonicteric sclera  Cardiovascular: Normal rate, regular rhythm, no murmurs, rubs, or gallops  Pulmonary/Chest: Effort normal. Diminished lung sounds right lower lobe.   Abdominal: Soft. Nontender, nondistended, no guarding or rigidity. BS present.   Musculoskeletal: Normal range of motion.   Neurological: Alert. Moves all extremities spontaneously.   Skin: Skin is warm and dry. No rash noted.   Psychiatric: Normal mood and affect.     Emergency Department Course     ECG:  ECG taken at 1407, ECG read at 1407  Sinus tachycardia   Low voltage QRS   Cannot rule out inferior infarct, age undetermined  Cannot rule out anterior infarct, age undetermined   abnormal ECG  Rate 119 bpm. MT interval 134 ms. QRS duration 72 ms. QT/QTc 330/464 ms. P-R-T axes 65 4 69.  No significant change when compared to EKG dated 9/9/19.    Imaging:  Radiology findings were  communicated with the patient who voiced understanding of the findings.    CT chest pulmonary embolism:  1. No pulmonary embolism demonstrated.  2. No thoracic aortic dissection or aneurysm.   3. Large right and small left pleural effusion with associated  compressive atelectasis, both effusions are increased from previous.  Reading per radiology    Laboratory:  Laboratory findings were communicated with the patient who voiced understanding of the findings.    Troponin 1509: 0.243 (HH)    CBC: WBC 19.3 (H), HGB 11.2(L),   BMP: Na 130 (L) K 3.3 (L) glucose 133 (H) Ca 8.2 (L) o/w WNL (Creatinine 0.56)    Magnesium: 0.9 (LL)     Interventions:  1512 NS, 1 L, IV  1604 Magnesium Sulfate 2 g IV  1737 Zosyn 4.5 g IV     Emergency Department Course:     Nursing notes and vitals reviewed.    1400 I performed an exam of the patient as documented above.     1509 IV was inserted and blood was drawn for laboratory testing, results above.    1555 I returned to check on patient.     1641 The patient was sent for a CT chest while in the emergency department, results above.     1740 I returned to check on patient. I personally reviewed the laboratory and imaging results with the patient and answered all related questions prior to admit.     1833  I spoke with Dr. Sawyer of the Hospitalist service from [facility] regarding patient's presentation, findings, and plan of care and he accepts pt for admission.     Impression & Plan      Medical Decision Making:  Odalys Nathan is a 59 year old female who presents to the emergency department today for evaluation of shortness of breath.  Patient has history of malignancy and pleural effusion that requires thoracentesis.  This is likely true today.  However, her white count is up to 19 and it was in the normal range upon discharge.  She is already on Augmentin therefore she was treated with IV Zosyn here for suspected infection.  Uncertain of the source and it may be  reactive due to her shortness of breath.  Patient also found to have an elevated troponin.  A CT PE is negative for a blood clot, therefore this troponin elevation is most likely demand ischemia due to her shortness of breath.  I discussed risks and benefits of heparinization for possible NSTEMI with patient and she declines at this time citing concern for bleeding as she is a Mu-ism.  Patient will require admission for thoracentesis and further work-up with troponin elevation and new leukocytosis.  Patient initially asked for admission to the [facility] however they have no beds.  Patient then consented for admission here at [facility].  All questions answered and she and her  are in agreement with the plan. Dr. Sawyer from our hospitalist service accepts for admission.     Diagnosis:    ICD-10-CM    1. Elevated troponin R79.89    2. Recurrent right pleural effusion J90    3. Leukocytosis, unspecified type D72.829         Disposition:   Findings and plan explained to the Patient who consents to admission. Discussed the patient with Dr. Sawyer, who will admit the patient to a medical bed for further monitoring, evaluation, and treatment.    Scribe Disclosure:  I, Deonna Steel, am serving as a scribe at 3:54 PM on 10/24/2019 to document services personally performed by Jose Torres MD based on my observations and the provider's statements to me.    [facility] EMERGENCY DEPARTMENT       Jose Torres MD  10/25/19 0407

## 2019-10-24 NOTE — PROGRESS NOTES
Care Coordinator Note  See note from Triage  I feel like I did before when my lung was filling up,  It started again after I got home form the hosptial.   I do not think I can wait til tomorrow. Reviewed with Dr Yeung Ok to do the throacentesis if unable to get done today patient should go tto the ER.  There are no openings today at [facility], [facility] or the .    Patient verbalized back understanding of the above information discussed.   Tammy CISNEROS, RN, OCN  Care Coordinator   Gynecologic Cancer   Office [phone]

## 2019-10-25 NOTE — PROGRESS NOTES
[facility]  Hospitalist Progress Note  Patient Name: Odalys Nathan    MRN: [id number]  Provider: Aden Torres MD  10/25/19    Initial presenting complaint/issue to hospital (Diagnosis): shortness of breath         Assessment and Plan:      Odalys Nathan is a 59 year old female admitted on 10/24/2019. She has history of metastatic ovarian cancer, hypothyroidism, malnutrition, failure to thrive, recurrent electrolyte abnormalities, and chronic pain syndrome.  She presented to emergency room with shortness of breath.  She was found to have recurrent right pleural effusion and hematuria on UA. Of note she has a biliary stent in place and recently had a ureter stent placed.      1.  Right pleural effusion.  Recurrent.  Likely malignant.  S/p thoracentesis this morning.       2.  Hypokalemia.  Potassium replacement protocol ordered.     3.  Hypomagnesemia.  Magnesium replacement protocol ordered.     4.  Mildly elevated troponin level.  Suspect demand ischemia from large right pleural effusion and respiratory difficulty. Echo showed normal EF and no WMA.      5.  Metastatic ovarian cancer.       6.  Chronic pain syndrome.  Restart home pain medications once verified by pharmacy.  Have additional pain medications available if needed.     7.  Hypothyroidism.  Restart levothyroxine.     8.  GERD.  Restart pantoprazole.    9.  Hematuria- likely due to ureter stent. Follow UC to rule out UTI. On Zosyn for now.    10.  Recent antibiotic use (Augmentin) for recent klebsiella pneumoniae bacteremia in setting of biliary stent and ureter stent. On Zosyn now. Continue PTA Augmentin on discharge.     Diet: Cardiac diet.  DVT Prophylaxis: Pneumatic Compression Devices  Laws Catheter: not present  Code Status: Full code.  Disposition: Possibly home tomorrow           Interval History:      Feeling better after thoracentesis                  Physical Exam:      Last Vital Signs:  BP 90/54 (BP Location: Right  arm)   Pulse 109   Temp 98.7  F (37.1  C) (Oral)   Resp 16   Wt 55.7 kg (122 lb 14.4 oz)   SpO2 93%   BMI 20.45 kg/m      Intake/Output Summary (Last 24 hours) at 10/25/2019 1619  Last data filed at 10/25/2019 1100  Gross per 24 hour   Intake 480 ml   Output 450 ml   Net 30 ml       GENERAL: Pleasant and cooperative. No acute distress.  EYES: Pupils equal and round. No scleral erythema or icterus.  ENT: External ears are normal without deformity. Posterior oropharynx is without erythem, swelling, or exudate.  NECK: Supple. No masses or swelling. No tenderness. Thyroid is normal without mass or tenderness.  CHEST: Clear to auscultation. Normal breath sounds. No retractions.   CV: Regular rate and rhythm. No JVD. Pulses normal.  ABDOMEN: Bowel sounds present. No tenderness. No masses or hernia.  EXTREMETIES: No clubbing, cyanosis, or ischemia.  SKIN: Warm and dry to touch. No wounds or rashes.  NEUROLOGIC: Strength and sensation are normal. Deep tendon reflexes are normal. Cranial nerves are normal.             Medications:      All current medications were reviewed.         Data:      All new lab and imaging data was reviewed.   Labs:  Recent Labs   Lab 10/24/19  2340   CULT PENDING          Lab Results   Component Value Date     10/25/2019     10/24/2019     10/22/2019    Lab Results   Component Value Date    CHLORIDE 102 10/25/2019    CHLORIDE 97 10/24/2019    CHLORIDE 99 10/22/2019    Lab Results   Component Value Date    BUN 10 10/25/2019    BUN 9 10/24/2019    BUN 10 10/22/2019      Lab Results   Component Value Date    POTASSIUM 4.4 10/25/2019    POTASSIUM 3.6 10/25/2019    POTASSIUM 3.3 10/24/2019    Lab Results   Component Value Date    CO2 23 10/25/2019    CO2 25 10/24/2019    CO2 26 10/22/2019    Lab Results   Component Value Date    CR 0.54 10/25/2019    CR 0.56 10/24/2019    CR 0.60 10/22/2019        Recent Labs   Lab 10/25/19  0730 10/24/19  1509   WBC 15.1* 19.3*   HGB 9.7* 11.2*    HCT 29.3* 33.8*   MCV 89 90    326

## 2019-10-25 NOTE — PLAN OF CARE
SOB and weakness  Vitals:   Temp: 98.4  F (36.9  C) Temp src: Axillary BP: (!) 93/36 Pulse: 109 Heart Rate: 109 Resp: 20 SpO2: 92 % O2 Device: None (Room air)        Neuro: A&Ox4  Lungs: Diminished in RLL, clear throughout, dyspnea upon excertion  Cardiac: Tele: ST in 100s  GI: chronic diarrhea, no abdominal pain  : Report pain/frequency with urination, bloody in urine UA sent, see results  Skin: WDL  IV: PIV SL  Labs: M.9, replaced and recheck at 2.4, K:3.3 replaced and rechecked at 3.6, Na:130, WBC:19.3, trops:0.243/0.162/0.140  Diet: cardiac  Pain: 2/10 pain, home MS contin  Activity: SBA denies dizziness with standing, up to bathroom x3  Plan: Thoracentesis today, IV zosyn, Echo today, oncology to see

## 2019-10-25 NOTE — PROCEDURES
[facility]    Procedure: Right thoracentesis  Date/Time: 10/25/2019 12:59 PM  Performed by: Hao Alatorre MD  Authorized by: Hao Alatorre MD     UNIVERSAL PROTOCOL   Site Marked: Yes  Prior Images Obtained and Reviewed:  Yes  Required items: Required blood products, implants, devices and special equipment available    Patient identity confirmed:  Verbally with patient  Patient was reevaluated immediately before administering moderate or deep sedation or anesthesia  Confirmation Checklist:  Patient's identity using two indicators, relevant allergies, procedure was appropriate and matched the consent or emergent situation and correct equipment/implants were available  Time out: Immediately prior to the procedure a time out was called    Preparation: Patient was prepped and draped in usual sterile fashion    ESBL (mL):  3     ANESTHESIA    Local Anesthetic: Lidocaine 1% without epinephrine      SEDATION    Patient Sedated: No    See dictated procedure note for full details.  Findings: Right thoracentesis with drainage of yellow fluid.    Specimens: none    Complications: None    Condition: Stable    PROCEDURE   Patient Tolerance:  Patient tolerated the procedure well with no immediate complications    Time of Sedation in Minutes by Physician:  0

## 2019-10-25 NOTE — ED NOTES
Pt ambulated in hallways about 20 metres. demeonstrated extreme weakness, some pulling on L leg d/t hip pain needing a walker and RN supervision. Very unsteady w/o walker. Post walk O2 95% RA.

## 2019-10-25 NOTE — PHARMACY-ADMISSION MEDICATION HISTORY
Admission medication history interview status for this patient is complete. See [facility] admission navigator for allergy information, prior to admission medications and immunization status.     Medication history interview source(s):Patient  Medication history resources (including written lists, pill bottles, clinic record):None    Changes made to PTA medication list:  Added: none  Deleted: none  Changed: protonix    Actions taken by pharmacist (provider contacted, etc):None     Additional medication history information: patient is putting all OTC on hold except magnesium    Medication reconciliation/reorder completed by provider prior to medication history? No    For patients on insulin therapy: no (Yes/No)   Lantus/levemir/NPH/Mix 70/30 dose: ___ in AM/PM or twice daily   Sliding scale Novolog Y/N   If Yes, do you have a baseline novolog pre-meal dose: ______units with meals   Patients eat three meals a day: Y/N ---  How many episodes of hypoglycemia (low blood glucose) do you have weekly: ---   How many missed doses do you have a week: ---  How many times do you check your blood glucose per day: ---  Any Barriers to therapy: cost of medications/comfortable with giving injections (if applicable)/ comfortable and confident with current diabetes regimen ---      Prior to Admission medications    Medication Sig Last Dose Taking? Auth Provider   albuterol (PROAIR HFA/PROVENTIL HFA/VENTOLIN HFA) 108 (90 Base) MCG/ACT inhaler Inhale 2 puffs into the lungs every 6 hours as needed for shortness of breath / dyspnea or wheezing  Yes Rosmery Aranda MD   amoxicillin-clavulanate (AUGMENTIN) 875-125 MG tablet Take 1 tablet by mouth 2 times daily 10/24/2019 at am Yes Nai Hitchcock,    cyanocobalamin (VITAMIN B12) 1000 MCG/ML injection Inject 1 mL (1,000 mcg) into the muscle every 30 days Past Week at Unknown time Yes Nga Yeung MD   diphenoxylate-atropine (LOMOTIL) 2.5-0.025 MG per tablet Take 2 tablets by mouth 4 times  daily as needed for diarrhea  Yes Nga Yeung MD   dronabinol (MARINOL) 2.5 MG capsule Take 1 capsule (2.5 mg) by mouth 2 times daily (before meals) Past Month at Unknown time Yes Patricia Rocha APRN CNP   HEMP OIL OR EXTRACT OR OTHER CBD CANNABINOID, NOT MEDICAL CANNABIS,  active Yes Reported, Patient   ipratropium - albuterol 0.5 mg/2.5 mg/3 mL (DUONEB) 0.5-2.5 (3) MG/3ML neb solution Take 1 vial (3 mLs) by nebulization every 6 hours as needed for wheezing or shortness of breath / dyspnea  Yes Yogesh Westbrook MD   LEVOTHYROXINE SODIUM PO Take 50 mcg by mouth daily  10/24/2019 at am Yes Reported, Patient   loperamide (IMODIUM) 2 MG capsule Take 2 mg by mouth 4 times daily as needed for diarrhea  Yes Reported, Patient   LORazepam (ATIVAN) 1 MG tablet Take 1 tablet (1 mg) by mouth every 6 hours as needed (Anxiety, Nausea/Vomiting or Sleep)  Yes Patricia Rocha APRN CNP   magnesium oxide (MAG-OX) 400 MG tablet Take 1 tablet (400 mg) by mouth 2 times daily 10/24/2019 at am Yes Terrie Porras APRN CNP   medical cannabis (Patient's own supply) (The purpose of this order is to document that the patient reports taking medical cannabis.  This is not a prescription, and is not used to certify that the patient has a qualifying medical condition.) active Yes Frank Wheeler MD   morphine (MS CONTIN) 15 MG CR tablet Take 1 tablet (15 mg) by mouth every 12 hours 10/24/2019 at am Yes Angeles Sanchez MD   naloxone (NARCAN) 4 MG/0.1ML nasal spray Spray 1 spray (4 mg) into one nostril alternating nostrils once as needed for opioid reversal Every 2-3 minutes until patient responsive or EMS arrives  Yes Nga Yeung MD   ondansetron (ZOFRAN-ODT) 4 MG ODT tab Take 1-2 tablets (4-8 mg) by mouth every 6 hours as needed for nausea or vomiting  Yes Yogesh Westbrook MD   oxyCODONE (ROXICODONE) 5 MG tablet Take 1 tablet (5 mg) by mouth every 6 hours as needed for severe pain  Yes Anjana,  Nga Alegria MD   pantoprazole (PROTONIX) 40 MG EC tablet Take 1 tablet (40 mg) by mouth daily  Yes Rosmery Aranda MD   polyethylene glycol (MIRALAX/GLYCOLAX) packet Take 1 packet by mouth daily Past Week at Unknown time Yes Reported, Patient   potassium chloride (KLOR-CON) 20 MEQ packet Take 20 mEq by mouth daily 10/24/2019 at am Yes Reported, Patient   prochlorperazine (COMPAZINE) 10 MG tablet Take 1 tablet (10 mg) by mouth every 6 hours as needed for nausea or vomiting  Yes Nga Yeung MD   tamsulosin (FLOMAX) 0.4 MG capsule Take 1 capsule (0.4 mg) by mouth daily For stent discomfort 10/24/2019 at am Yes Armando Daigle MD   Ascorbic Acid (VITAMIN C PO) Take 500 mg by mouth daily  on hold  Reported, Patient   Calcium Carbonate-Vitamin D (CALCIUM + D PO) Take 1 tablet by mouth daily. on hold  Reported, Patient   clotrimazole 10 MG shane Take 1 Shane (10 mg) by mouth 5 times daily on hold  Yogesh Westbrook MD   Ferrous Sulfate 324 (65 Fe) MG TBEC TAKE ONE TABLET BY MOUTH THREE TIMES DAILY WITH MEALS. TAKE WITH A SMALL AMOUNT OF ORANGE JUICE. DO NOT TAKE WITH CALCIUM on hold  Patricia Rocha APRN CNP   HERBALS daily  on hold  Reported, Patient   order for DME Injection Supplies for Vitamin B12: 3cc syringes w/ 27 gauge needles, 1/2 inch length   Nga Yeung MD   VITAMIN E NATURAL PO Take 100 Units by mouth daily on hold  Reported, Patient

## 2019-10-25 NOTE — PROGRESS NOTES
Sepsis Evaluation Progress Note    I was called to see Odalys Nathan due to abnormal vital signs triggering the Sepsis SIRS screening alert. She is known to have an infection.     Physical Exam   Vital Signs:  Temp: 98.5  F (36.9  C) Temp src: Oral BP: 90/45 Pulse: 109 Heart Rate: 111 Resp: 20 SpO2: 96 % O2 Device: None (Room air)      Lab:  Lactic Acid   Date Value Ref Range Status   09/20/2019 2.1 (H) 0.7 - 2.0 mmol/L Final     Lactate for Sepsis Protocol   Date Value Ref Range Status   10/24/2019 2.0 0.7 - 2.0 mmol/L Final       The patient is at baseline mental status.     The rest of their physical exam is significant for Diminished breath sounds on right    Assessment & Plan   NO EVIDENCE OF SEPSIS at this time.  Vital sign, physical exam, and lab findings are likely due to effusion.    Disposition: The patient will remain on the current unit. We will continue to monitor this patient closely.  Antonio Sawyer, DO    Sepsis Criteria   Sepsis: 2+ SIRS criteria due to infection  Severe Sepsis: Sepsis AND 1+ new sign of acute organ dysfunction (Note: lactate >2 is organ dysfunction)  Septic Shock: Sepsis AND hypotension despite volume resuscitation with 30 ml/kg crystalloid

## 2019-10-25 NOTE — CONSULTS
CLINICAL NUTRITION SERVICES  -  ASSESSMENT NOTE      MALNUTRITION:  % Weight Loss:  Unable to determine without admit wt --> thora completed during admit  % Intake:  </= 75% for >/= 1 month (severe malnutrition)  Subcutaneous Fat Loss:  Orbital region mild depletion and Upper arm region mild to moderate depletion --> incomplete exam today  Muscle Loss:  Temporal region moderate depletion, Clavicle bone region moderate depletion and Acromion bone region moderate depletion --> incomplete exam today, needs follow-up  Fluid Retention:  At least mild    Malnutrition Diagnosis: Severe malnutrition  In Context of:  Acute illness or injury  Chronic illness or disease        REASON FOR ASSESSMENT  Odalys Nathan is a 59 year old female seen by Registered Dietitian for Admission Nutrition Risk Screen for unintentional loss of 10# or more in the past two months.      NUTRITION HISTORY  - Information obtained from patient,  in room, and chart.  - Patient with a h/o metastatic ovarian CA, malnutrition, FTT, chronic pain.  Admit 2/2 recurrent R PE.    - Recent [facility] admits in 9/2019 and earlier this month.  Barriers to PO intake identified at this time include overall decreased appetite, nausea, early satiety, pain, taste changes.  Supplements ordered during previous admits.     - Visit with patient quite short as she was feeling tired.   provided most of history.  Barriers to PO intakes continue to be decreased appetite and early satiety.   Was drinking Boost at home, though not consistently.  Continues to meet <75% needs (or less) since recent hospital admit.   notes decreased intakes occurring for period of months (confirmed by previous RD notes).    - NKFA.      CURRENT NUTRITION ORDERS  Diet Order:     Cardiac/no caffeine     Current Intake/Tolerance:  Wants Boost Plus (chocolate or vanilla).      NUTRITION FOCUSED PHYSICAL ASSESSMENT FOR DIAGNOSING MALNUTRITION)  Completed:  Yes Partial assessment  "---> LEs limited as wearing pants         Observed:    Muscle wasting (refer to documentation in Malnutrition section) and Subcutaneous fat loss (refer to documentation in Malnutrition section)    Obtained from Chart/Interdisciplinary Team:  FLASH benson completed this afternoon    ANTHROPOMETRICS  Height: 5' 5\"  Weight: 54.8 kg --> most recent wt available  There is no height or weight on file to calculate BMI.  Weight Status:  Unable to determine without admit wt  IBW: 125#  % IBW: Unable to determine without admit wt  Weight History:  Wt Readings from Last 10 Encounters:   10/17/19 54.8 kg (120 lb 12.8 oz)   10/17/19 53.8 kg (118 lb 8 oz)   10/15/19 54.7 kg (120 lb 8 oz)   10/10/19 54.9 kg (121 lb)   10/09/19 55 kg (121 lb 4.8 oz)   10/03/19 53.4 kg (117 lb 11.6 oz)   10/03/19 53.2 kg (117 lb 4.8 oz)   09/24/19 53.5 kg (118 lb)   09/19/19 52.6 kg (115 lb 15.4 oz)   09/11/19 60.9 kg (134 lb 3.2 oz)   - 135-140# reported as UBW.    - When asked about wt trending patient/ note \"it's been a lot\".    - Unable to fully evaluate wt trends without admit wt.    LABS  Labs reviewed    MEDICATIONS  Medications reviewed including Marinol (for appetite, not nausea)      ASSESSED NUTRITION NEEDS PER APPROVED PRACTICE GUIDELINES:    Dosing Weight 54.8 kg - utilizing most recent wt until admit wt obtained  Estimated Energy Needs: 5040-1666 kcals (30-35 Kcal/Kg)  Justification: minimum maintenance with malignancy  Estimated Protein Needs: 66-82 grams protein (1.2-1.5 g pro/Kg)  Justification: Repletion and preservation of lean body mass  Estimated Fluid Needs: per MD    MALNUTRITION:  % Weight Loss:  Unable to determine without admit wt --> marcelino completed during admit  % Intake:  </= 75% for >/= 1 month (severe malnutrition)  Subcutaneous Fat Loss:  Orbital region mild depletion and Upper arm region mild to moderate depletion --> incomplete exam today  Muscle Loss:  Temporal region moderate depletion, Clavicle bone region " moderate depletion and Acromion bone region moderate depletion --> incomplete exam today, needs follow-up  Fluid Retention:  At least mild    Malnutrition Diagnosis: Severe malnutrition  In Context of:  Acute illness or injury  Chronic illness or disease    NUTRITION DIAGNOSIS:  Malnutrition related to chronically decreased appetite and oral intakes in the setting of malignancy leading to likely wt and LBM losses as evidenced by fat/muscle loss, meeting <75% needs for period of months PTA.      NUTRITION INTERVENTIONS  Recommendations / Nutrition Prescription  Liberalize diet to regular.      Boost BID per patient request.        Implementation  Nutrition education: Provided education on above with patient/.    Medical Food Supplement: As above.    Admit wt (ordered).        Nutrition Goals  Patient to consume at least 50-75% of meals or supplements TID.      MONITORING AND EVALUATION:  Progress towards goals will be monitored and evaluated per protocol and Practice Guidelines          Ledy Leiva RD, LD  Clinical Dietitian  3rd floor/ICU: [phone]  All other floors: [phone]  Weekend/holiday: [phone]

## 2019-10-25 NOTE — H&P
[facility]    History and Physical - Hospitalist Service       Date of Admission:  10/24/2019    Assessment & Plan   Odalys Nathan is a 59 year old female admitted on 10/24/2019. She has a past medical history significant for metastatic ovarian cancer, hypothyroidism, malnutrition, failure to thrive, recurrent electrolyte abnormalities, and chronic pain syndrome.  She presented to emergency room with shortness of breath.  Found to have recurrent right pleural effusion.    1.  Right pleural effusion.  Recurrent.  Likely malignant.  Plan for thoracentesis in the morning.  Oncology consult.  Continue IV Zosyn.    2.  Hypokalemia.  Start potassium replacement protocol.    3.  Hypomagnesemia.  Start magnesium replacement protocol.    4.  Mildly elevated troponin level.  Suspect demand ischemia from large right pleural effusion.  Start aspirin 81 mg a day.  Hold off on heparin products for now.  Check serial troponins.  Monitor on telemetry.  Check echocardiogram tomorrow.  If troponin level elevated significantly on recheck, would start heparin products.  If abnormalities noted on echocardiogram or significant elevation in troponin level, would consult cardiology.    5.  Metastatic ovarian cancer.  Oncology consult.    6.  Chronic pain syndrome.  Restart home pain medications once verified by pharmacy.  Have additional pain medications available if needed.    7.  Hypothyroidism.  Restart levothyroxine.    8.  GERD.  Restart pantoprazole.     Diet: Cardiac diet.  DVT Prophylaxis: Pneumatic Compression Devices  Laws Catheter: not present  Code Status: Full code.    Disposition Plan   Expected discharge: 2 - 3 days, recommended to prior living arrangement     Antonio Sawyer, DO  [facility]    ______________________________________________________________________    Chief Complaint   Shortness of breath.    History is obtained from the patient    History of Present Illness   Odalys  Osmar Nathan is a 59 year old female who has a past medical history significant for metastatic ovarian cancer, hypothyroidism, malnutrition, failure to thrive, recurrent electrolyte abnormalities, and chronic pain syndrome.  She presented to emergency room with shortness of breath.  She has been having problems with recurrent pleural effusions recently.  She has known metastatic ovarian cancer.  She had just been hospitalized a few days ago for multiple issues.  1 of the issues had been pleural effusion with need for thoracentesis.  She left the hospital on 10/20.  She did feel quite a bit better for the first 2 days after leaving the hospital.  Yesterday, began to feel short of breath again.  Shortness of breath is gotten worse today.  She has not noticed any fevers or chills.  Has not had any chest pain.  Does have an occasional dry cough.  She did recently have a ureteral stent placed.  Has been having quite a bit of problems with pain on urination.  Also having urinary frequency.  Frequently has loose stools due to her cancer treatment.  Has not noticed any change in her stools recently.  Her shortness of breath does feel very similar to previous episodes.  Nothing at home seem to be helping symptoms.  No other acute complaints.    Review of Systems    The 10 point Review of Systems is negative other than noted in the HPI     Past Medical History    I have reviewed this patient's medical history and updated it with pertinent information if needed.   Past Medical History:   Diagnosis Date     Antiplatelet or antithrombotic long-term use      Ascites      Blood clot in the legs      Diabetes (H)      Ovarian cancer (H)     serous,stg IV     Pleural effusion      Pulmonary embolism (H) 2/2012     Refusal of blood transfusions as patient is [facility]      Short gut syndrome      Subclinical hypothyroidism 4/18/2013     Thrombosis of leg        Past Surgical History   I have reviewed this patient's surgical  history and updated it with pertinent information if needed.  Past Surgical History:   Procedure Laterality Date     COLECTOMY       COLONOSCOPY  2/1/2012    Procedure:COLONOSCOPY; With Biopsy; Surgeon:TONI SULLIVAN; Location:[facility] OR     CYSTOSCOPY, RETROGRADES, INSERT STENT URETER(S), COMBINED Right 10/4/2019    Procedure: CYSTOSCOPY, WITH RETROGRADE PYELOGRAM AND URETERAL STENT INSERTION;  Surgeon: Wolf Gan MD;  Location: [facility] OR     ENDOSCOPIC RETROGRADE CHOLANGIOPANCREATOGRAM N/A 8/23/2019    Procedure: Endoscopic Retrograde Cholangiopancreatogram with 4 mm Hurricane Balloon Dilation, gallbladder stent and Bile Duct stent placements, Bilarry Sphincterotomy ;  Surgeon: Catrachito Lloyd MD;  Location: [facility] OR     HYSTERECTOMY TOTAL ABD, RHIANNA SALPINGO-OOPHORECTOMY, NODE DISSECTION, TUMOR DEBULKING, COMBINED  2/1/2012    Procedure:COMBINED HYSTERECTOMY TOTAL ABDOMINAL, BILATERAL SALPINGO-OOPHORECTOMY, NODE DISSECTION, TUMOR DEBULKING;  Exploratory Laparotomy, Total Abdominal Hysterectomy, Bilateral Salpingo-Oophorectomy, appendectomy,lysis of adhesions, ileal, ascending, transverse and splenic flexure resection, ileal descending bowel renanastomosis, incidental cystotomy repair, CUSA procedure and colonoscopy ; [facility]     INSERT PORT PERITONEAL ACCESS  4/3/2012    Procedure:INSERT PORT PERITONEAL ACCESS; Intraperitoneal Port Placement (c-arm); Surgeon:SAMUEL CARRASCO; Location:[facility] OR     INSERT PORT PERITONEAL ACCESS  5/14/2014    Procedure: INSERT PORT PERITONEAL ACCESS;  Surgeon: Nga Yeung MD;  Location: [facility] OR     INSERT PORT VASCULAR ACCESS       IR PORT REMOVAL RIGHT  9/20/2019     LAPAROSCOPY DIAGNOSTIC (GYN)  5/14/2014    Procedure: LAPAROSCOPY DIAGNOSTIC (GYN);  Surgeon: Nga Yeung MD;  Location: [facility] OR     LAPAROTOMY EXPLORATORY Right 11/25/2015    Procedure: LAPAROTOMY EXPLORATORY;  Surgeon: Nga Yeung MD;  Location: [facility] OR     LAPAROTOMY, TUMOR DEBULKING, COMBINED   2014    Procedure: COMBINED LAPAROTOMY, TUMOR DEBULKING;  Surgeon: Nga Yeung MD;  Location: [facility] OR     OPTICAL TRACKING SYSTEM CRANIOTOMY, EXCISE TUMOR, COMBINED Right 2019    Procedure: Stealth Assisted Right Craniotomy And Tumor Resection;  Surgeon: Bertin Dewey MD;  Location: [facility] OR     PICC INSERTION Left 2019    5Fr - 46cm (4cm external), basilic vein, low SVC     REMOVE CATHETER PERITONEAL N/A 2014    Procedure: REMOVE CATHETER PERITONEAL;  Surgeon: Nga Yeung MD;  Location: [facility] OR     VASCULAR SURGERY      stent left iliac vein       Social History   I have reviewed this patient's social history and updated it with pertinent information if needed.  Social History     Tobacco Use     Smoking status: Former Smoker     Packs/day: 0.00     Years: 8.00     Pack years: 0.00     Types: Cigarettes     Last attempt to quit: 1980     Years since quittin.3     Smokeless tobacco: Never Used     Tobacco comment: started at 9 yo and quit at 18 yo   Substance Use Topics     Alcohol use: Yes     Comment: 3x/day wine or jodi     Drug use: No       Family History   I have reviewed this patient's family history and updated it with pertinent information if needed.   Family History   Problem Relation Age of Onset     Cancer Mother 69        lung, smoker     Cancer Maternal Uncle 65        brain     Colon Cancer Maternal Aunt 80        colon       Prior to Admission Medications   Prior to Admission Medications   Prescriptions Last Dose Informant Patient Reported? Taking?   Ascorbic Acid (VITAMIN C PO) on hold Self Yes No   Sig: Take 500 mg by mouth daily    Calcium Carbonate-Vitamin D (CALCIUM + D PO) on hold Self Yes No   Sig: Take 1 tablet by mouth daily.   Ferrous Sulfate 324 (65 Fe) MG TBEC on hold  No No   Sig: TAKE ONE TABLET BY MOUTH THREE TIMES DAILY WITH MEALS. TAKE WITH A SMALL AMOUNT OF ORANGE JUICE. DO NOT TAKE WITH CALCIUM   HEMP OIL OR EXTRACT OR OTHER  CBD CANNABINOID, NOT MEDICAL CANNABIS, active  Yes Yes   HERBALS on hold Self Yes No   Sig: daily    LEVOTHYROXINE SODIUM PO 10/24/2019 at am  Yes Yes   Sig: Take 50 mcg by mouth daily    LORazepam (ATIVAN) 1 MG tablet   No Yes   Sig: Take 1 tablet (1 mg) by mouth every 6 hours as needed (Anxiety, Nausea/Vomiting or Sleep)   VITAMIN E NATURAL PO on hold Self Yes No   Sig: Take 100 Units by mouth daily   albuterol (PROAIR HFA/PROVENTIL HFA/VENTOLIN HFA) 108 (90 Base) MCG/ACT inhaler   No Yes   Sig: Inhale 2 puffs into the lungs every 6 hours as needed for shortness of breath / dyspnea or wheezing   amoxicillin-clavulanate (AUGMENTIN) 875-125 MG tablet 10/24/2019 at am  No Yes   Sig: Take 1 tablet by mouth 2 times daily   clotrimazole 10 MG rell on hold  No No   Sig: Take 1 Rell (10 mg) by mouth 5 times daily   cyanocobalamin (VITAMIN B12) 1000 MCG/ML injection Past Week at Unknown time Self No Yes   Sig: Inject 1 mL (1,000 mcg) into the muscle every 30 days   diphenoxylate-atropine (LOMOTIL) 2.5-0.025 MG per tablet   No Yes   Sig: Take 2 tablets by mouth 4 times daily as needed for diarrhea   dronabinol (MARINOL) 2.5 MG capsule Past Month at Unknown time  No Yes   Sig: Take 1 capsule (2.5 mg) by mouth 2 times daily (before meals)   ipratropium - albuterol 0.5 mg/2.5 mg/3 mL (DUONEB) 0.5-2.5 (3) MG/3ML neb solution   No Yes   Sig: Take 1 vial (3 mLs) by nebulization every 6 hours as needed for wheezing or shortness of breath / dyspnea   loperamide (IMODIUM) 2 MG capsule   Yes Yes   Sig: Take 2 mg by mouth 4 times daily as needed for diarrhea   magnesium oxide (MAG-OX) 400 MG tablet 10/24/2019 at am Self No Yes   Sig: Take 1 tablet (400 mg) by mouth 2 times daily   medical cannabis (Patient's own supply) active  Yes Yes   Sig: (The purpose of this order is to document that the patient reports taking medical cannabis.  This is not a prescription, and is not used to certify that the patient has a qualifying medical  condition.)   morphine (MS CONTIN) 15 MG CR tablet 10/24/2019 at am  No Yes   Sig: Take 1 tablet (15 mg) by mouth every 12 hours   naloxone (NARCAN) 4 MG/0.1ML nasal spray   No Yes   Sig: Spray 1 spray (4 mg) into one nostril alternating nostrils once as needed for opioid reversal Every 2-3 minutes until patient responsive or EMS arrives   ondansetron (ZOFRAN-ODT) 4 MG ODT tab   No Yes   Sig: Take 1-2 tablets (4-8 mg) by mouth every 6 hours as needed for nausea or vomiting   order for DME   No No   Sig: Injection Supplies for Vitamin B12: 3cc syringes w/ 27 gauge needles, 1/2 inch length   oxyCODONE (ROXICODONE) 5 MG tablet   No Yes   Sig: Take 1 tablet (5 mg) by mouth every 6 hours as needed for severe pain   pantoprazole (PROTONIX) 40 MG EC tablet   Yes Yes   Sig: Take 40 mg by mouth daily as needed for heartburn   polyethylene glycol (MIRALAX/GLYCOLAX) packet Past Week at Unknown time  Yes Yes   Sig: Take 1 packet by mouth daily   potassium chloride (KLOR-CON) 20 MEQ packet 10/24/2019 at am Self Yes Yes   Sig: Take 20 mEq by mouth daily   prochlorperazine (COMPAZINE) 10 MG tablet   No Yes   Sig: Take 1 tablet (10 mg) by mouth every 6 hours as needed for nausea or vomiting   tamsulosin (FLOMAX) 0.4 MG capsule 10/24/2019 at am  No Yes   Sig: Take 1 capsule (0.4 mg) by mouth daily For stent discomfort      Facility-Administered Medications: None     Allergies   No Known Allergies    Physical Exam   Vital Signs: Temp: 97.9  F (36.6  C) Temp src: Oral BP: 91/58 Pulse: 109 Heart Rate: 108 Resp: 27 SpO2: 95 % O2 Device: None (Room air)    Weight: 0 lbs 0 oz    Gen:  NAD, A&Ox3.  Eyes:  PERRL, sclera anicteric.  OP:  MMM, no lesions.  Neck:  Supple.  CV:  Regular, no murmurs.  Lung: Very diminished breath sounds on right.  Diminished breath sounds at left base.  Normal effort.  Ab:  +BS, soft.  Skin:  Warm, dry to touch.  No rash.  Ext:  Mild non pitting edema LE b/l.      Data   Data reviewed today: I reviewed all  medications, new labs and imaging results over the last 24 hours. I personally reviewed the EKG tracing showing Sinus rhythm.  Mild ST depression in V2 and V3.  Also likely slight ST depression in lead I.    Recent Labs   Lab 10/24/19  1509 10/22/19  1445 10/20/19  0540  10/19/19  0539  10/18/19  0546   WBC 19.3*  --   --   --   --   --  5.4   HGB 11.2*  --   --   --   --   --  10.1*   MCV 90  --   --   --   --   --  93     --   --   --   --   --  342   INR  --   --   --   --  1.13  --   --    * 132* 132*   < > 134   < > 131*   POTASSIUM 3.3* 4.0 4.2   < > 4.3   < > 4.0   CHLORIDE 97 99 102   < > 103   < > 98   CO2 25 26 25   < > 25   < > 25   BUN 9 10 7   < > 5*   < > 4*   CR 0.56 0.60 0.55   < > 0.55   < > 0.57   ANIONGAP 8 7 4   < > 6   < > 8   JOSE ALBERTO 8.2* 8.5 8.2*   < > 7.8*   < > 8.0*   * 115* 96   < > 96   < > 97   ALBUMIN  --   --   --   --   --   --  1.7*   PROTTOTAL  --   --   --   --   --   --  5.7*   BILITOTAL  --   --   --   --   --   --  0.8   ALKPHOS  --   --   --   --   --   --  825*   ALT  --   --   --   --   --   --  30   AST  --   --   --   --   --   --  25   TROPI 0.243*  --   --   --   --   --   --     < > = values in this interval not displayed.

## 2019-10-25 NOTE — PROGRESS NOTES
Thoracentesis consent and procedure completed by Dr. Alatorre.  Tolerated well.  No complications.  VSS.  1450 mLs drained from Right.  Gibson yellow color.  Site WDL.  Bandaid applied.  Specimen collected and sent to lab.  BP low 78/60 pt slight dizzy layed back, water given. Recheck  87/53.  CXR ordered and completed.  Pt transferred back to room 327 via wc.  Report called to floor RN.

## 2019-10-25 NOTE — PROGRESS NOTES
ROOM # 327    Living Situation (if not independent, order SW consult): home with   :  Marcos,     Activity level at baseline: ind  Activity level on admit: SBA

## 2019-10-25 NOTE — PLAN OF CARE
BPs in 90/50's. HR max 176 post-ambulation, MD aware. At rest -118. Other VSS. Tele ST. Pain to LLQ, prn oxycodone given with relief. LS diminished. Echo today EF 60%. Thoracentesis today bandaid CDI. Dark cloudy yellow urine, loose stool this AM. Pt requesting prn miralax, per MD okay to give. Poor appetite. SBA. Possible discharge tomorrow.

## 2019-10-25 NOTE — CONSULTS
[facility]    Hematology / Oncology Consultation     Date of Admission:  10/24/2019  Date of Consult (When I saw the patient): 10/25/19    Assessment & Plan   Odalys Nathan is a 59 year old female who was admitted on 10/24/2019. I was asked to see the patient for metastatic ovarian cancer admitted with SOB.          Assessment:   - SOB, elevated WBC, recurrent pleural effusion  - Recurrent pleural effusion likely malignant  - Cardiac strain with troponin leak  - Bilateral ovarian cancer s/p multiple lines of therapy including carboplatin with taxol, carboplatin with doxil, intraperitoneal chemotherapy with carbotaxol, gemcitabine  - Recently diagnosed brain metastasis s/p gamma knife radiation done at [facility] under care of Dr. Scarlet Tellez.     Odalys had progressive SOB last month for which she had thoracentesis done with relief. She was again SOB and could not get in for thoracentesis soon enough and was referred to ED for evaluation. She again has large right pleural effusion. She also has leucocytosis and was noted to have troponin leak. It is likely that her strain was only due to the massive pleural effusion and overall health. I do not feel that it necessarily warrants a huge cardiology work up.    She had thoracentesis done just prior to evaluation and is feeling better. Her troponin has been trending down.     Dr. Yeung had reviewed a number of treatment options with her. She has just completed her resection of tumor from brain and radiation therapy. She has a follow up with Dr. Nga Yeung on Monday.      Recommendations:   - Agree with the supportive measures as current   - She is feeling much better after her thoracentesis  - Okay to forgo extensive cardiac evaluation since echocardiogram is normal and stable  - She has a follow up with Dr. Yeung on Monday and is looking forward to it.   - Would discharge her on levaquin for 5 days.  - Okay to discharge her when  clinically stable. Please do call us with questions. Dr. Wayne Kaur is on call for the weekend.     I have reviewed her care with Dr. Torres    Over 60 min of time spent with more than 50% time spent in counseling and coordinating care.        Tj Beckett MD    Code Status    Full Code    Reason for Consult   Reason for consult: I was asked by Dr. Sawyer to evaluate this patient for metastatic ovarian cancer.    Primary Care Physician   Aubrie Hester    Chief Complaint   Shortness of breath    History is obtained from the patient    History of Present Illness   Odalys Nathan is a 59 year old female who presents with increasing SOB.    Odalys notes that things have been unraveling for her over the last couple of months. She had progression of disease in her liver and needed stents. She was admitted for Klebsiella bacteremia. She had new brain metastasis which has needed resection and radiation. She has progressive SOB and needed thoracentesis. She again got SOB and had needed repeat procedure but could not be scheduled soon. She could not walk even very short distances without getting SOB.     Past Medical History   I have reviewed this patient's medical history and updated it with pertinent information if needed.   Past Medical History:   Diagnosis Date     Antiplatelet or antithrombotic long-term use      Ascites      Blood clot in the legs      Diabetes (H)      Ovarian cancer (H)     serous,stg IV     Pleural effusion      Pulmonary embolism (H) 2/2012     Refusal of blood transfusions as patient is Protestant      Short gut syndrome      Subclinical hypothyroidism 4/18/2013     Thrombosis of leg        Past Surgical History   I have reviewed this patient's surgical history and updated it with pertinent information if needed.  Past Surgical History:   Procedure Laterality Date     COLECTOMY       COLONOSCOPY  2/1/2012    Procedure:COLONOSCOPY; With Biopsy; Surgeon:TONI SULLIVAN  CORTNEY; Location:[facility] OR     CYSTOSCOPY, RETROGRADES, INSERT STENT URETER(S), COMBINED Right 10/4/2019    Procedure: CYSTOSCOPY, WITH RETROGRADE PYELOGRAM AND URETERAL STENT INSERTION;  Surgeon: Wolf Gan MD;  [facility] OR     ENDOSCOPIC RETROGRADE CHOLANGIOPANCREATOGRAM N/A 8/23/2019    Procedure: Endoscopic Retrograde Cholangiopancreatogram with 4 mm Hurricane Balloon Dilation, gallbladder stent and Bile Duct stent placements, Bilarry Sphincterotomy ;  Surgeon: Catrachito Lloyd MD;  Location: [facility] OR     HYSTERECTOMY TOTAL ABD, RHIANNA SALPINGO-OOPHORECTOMY, NODE DISSECTION, TUMOR DEBULKING, COMBINED  2/1/2012    Procedure:COMBINED HYSTERECTOMY TOTAL ABDOMINAL, BILATERAL SALPINGO-OOPHORECTOMY, NODE DISSECTION, TUMOR DEBULKING;  Exploratory Laparotomy, Total Abdominal Hysterectomy, Bilateral Salpingo-Oophorectomy, appendectomy,lysis of adhesions, ileal, ascending, transverse and splenic flexure resection, ileal descending bowel renanastomosis, incidental cystotomy repair, CUSA procedure and colonoscopy ; [facility]     INSERT PORT PERITONEAL ACCESS  4/3/2012    Procedure:INSERT PORT PERITONEAL ACCESS; Intraperitoneal Port Placement (c-arm); Surgeon:SAMUEL CARRASCO; Location:[facility] OR     INSERT PORT PERITONEAL ACCESS  5/14/2014    Procedure: INSERT PORT PERITONEAL ACCESS;  Surgeon: Nga Yeung MD;  Location: [facility] OR     INSERT PORT VASCULAR ACCESS       IR PORT REMOVAL RIGHT  9/20/2019     LAPAROSCOPY DIAGNOSTIC (GYN)  5/14/2014    Procedure: LAPAROSCOPY DIAGNOSTIC (GYN);  Surgeon: Nga Yeung MD;  Location: [facility] OR     LAPAROTOMY EXPLORATORY Right 11/25/2015    Procedure: LAPAROTOMY EXPLORATORY;  Surgeon: Nga Yeung MD;  [facility]: [facility]     LAPAROTOMY, TUMOR DEBULKING, COMBINED  5/14/2014    Procedure: COMBINED LAPAROTOMY, TUMOR DEBULKING;  Surgeon: Nga Yeung MD;  Location: [facility] OR     OPTICAL TRACKING SYSTEM CRANIOTOMY, EXCISE TUMOR, COMBINED Right 9/18/2019    Procedure: Stealth  Assisted Right Craniotomy And Tumor Resection;  Surgeon: Bertin Dewey MD;  Location: [facility] OR     PICC INSERTION Left 09/20/2019    5Fr - 46cm (4cm external), basilic vein, low SVC     REMOVE CATHETER PERITONEAL N/A 8/20/2014    Procedure: REMOVE CATHETER PERITONEAL;  Surgeon: Nga Yeung MD;  Location: [facility] OR     VASCULAR SURGERY      stent left iliac vein       Prior to Admission Medications   Prior to Admission Medications   Prescriptions Last Dose Informant Patient Reported? Taking?   Ascorbic Acid (VITAMIN C PO) on hold Self Yes No   Sig: Take 500 mg by mouth daily    Calcium Carbonate-Vitamin D (CALCIUM + D PO) on hold Self Yes No   Sig: Take 1 tablet by mouth daily.   Ferrous Sulfate 324 (65 Fe) MG TBEC on hold  No No   Sig: TAKE ONE TABLET BY MOUTH THREE TIMES DAILY WITH MEALS. TAKE WITH A SMALL AMOUNT OF ORANGE JUICE. DO NOT TAKE WITH CALCIUM   HEMP OIL OR EXTRACT OR OTHER CBD CANNABINOID, NOT MEDICAL CANNABIS, active  Yes Yes   HERBALS on hold Self Yes No   Sig: daily    LEVOTHYROXINE SODIUM PO 10/24/2019 at am  Yes Yes   Sig: Take 50 mcg by mouth daily    LORazepam (ATIVAN) 1 MG tablet   No Yes   Sig: Take 1 tablet (1 mg) by mouth every 6 hours as needed (Anxiety, Nausea/Vomiting or Sleep)   VITAMIN E NATURAL PO on hold Self Yes No   Sig: Take 100 Units by mouth daily   albuterol (PROAIR HFA/PROVENTIL HFA/VENTOLIN HFA) 108 (90 Base) MCG/ACT inhaler   No Yes   Sig: Inhale 2 puffs into the lungs every 6 hours as needed for shortness of breath / dyspnea or wheezing   amoxicillin-clavulanate (AUGMENTIN) 875-125 MG tablet 10/24/2019 at am  No Yes   Sig: Take 1 tablet by mouth 2 times daily   clotrimazole 10 MG rell on hold  No No   Sig: Take 1 Rell (10 mg) by mouth 5 times daily   cyanocobalamin (VITAMIN B12) 1000 MCG/ML injection Past Week at Unknown time Self No Yes   Sig: Inject 1 mL (1,000 mcg) into the muscle every 30 days   diphenoxylate-atropine (LOMOTIL) 2.5-0.025 MG per tablet   No  Yes   Sig: Take 2 tablets by mouth 4 times daily as needed for diarrhea   dronabinol (MARINOL) 2.5 MG capsule Past Month at Unknown time  No Yes   Sig: Take 1 capsule (2.5 mg) by mouth 2 times daily (before meals)   ipratropium - albuterol 0.5 mg/2.5 mg/3 mL (DUONEB) 0.5-2.5 (3) MG/3ML neb solution   No Yes   Sig: Take 1 vial (3 mLs) by nebulization every 6 hours as needed for wheezing or shortness of breath / dyspnea   loperamide (IMODIUM) 2 MG capsule   Yes Yes   Sig: Take 2 mg by mouth 4 times daily as needed for diarrhea   magnesium oxide (MAG-OX) 400 MG tablet 10/24/2019 at am Self No Yes   Sig: Take 1 tablet (400 mg) by mouth 2 times daily   medical cannabis (Patient's own supply) active  Yes Yes   Sig: (The purpose of this order is to document that the patient reports taking medical cannabis.  This is not a prescription, and is not used to certify that the patient has a qualifying medical condition.)   morphine (MS CONTIN) 15 MG CR tablet 10/24/2019 at am  No Yes   Sig: Take 1 tablet (15 mg) by mouth every 12 hours   naloxone (NARCAN) 4 MG/0.1ML nasal spray   No Yes   Sig: Spray 1 spray (4 mg) into one nostril alternating nostrils once as needed for opioid reversal Every 2-3 minutes until patient responsive or EMS arrives   ondansetron (ZOFRAN-ODT) 4 MG ODT tab   No Yes   Sig: Take 1-2 tablets (4-8 mg) by mouth every 6 hours as needed for nausea or vomiting   order for DME   No No   Sig: Injection Supplies for Vitamin B12: 3cc syringes w/ 27 gauge needles, 1/2 inch length   oxyCODONE (ROXICODONE) 5 MG tablet   No Yes   Sig: Take 1 tablet (5 mg) by mouth every 6 hours as needed for severe pain   pantoprazole (PROTONIX) 40 MG EC tablet   Yes Yes   Sig: Take 40 mg by mouth daily as needed for heartburn   polyethylene glycol (MIRALAX/GLYCOLAX) packet Past Week at Unknown time  Yes Yes   Sig: Take 1 packet by mouth daily   potassium chloride (KLOR-CON) 20 MEQ packet 10/24/2019 at am Self Yes Yes   Sig: Take 20 mEq  by mouth daily   prochlorperazine (COMPAZINE) 10 MG tablet   No Yes   Sig: Take 1 tablet (10 mg) by mouth every 6 hours as needed for nausea or vomiting   tamsulosin (FLOMAX) 0.4 MG capsule 10/24/2019 at am  No Yes   Sig: Take 1 capsule (0.4 mg) by mouth daily For stent discomfort      Facility-Administered Medications: None     Allergies   No Known Allergies    Social History   I have reviewed this patient's social history and updated it with pertinent information if needed. Odalys Nathan  reports that she quit smoking about 39 years ago. Her smoking use included cigarettes. She smoked 0.00 packs per day for 8.00 years. She has never used smokeless tobacco. She reports current alcohol use. She reports that she does not use drugs.    Family History   I have reviewed this patient's family history and updated it with pertinent information if needed.   Family History   Problem Relation Age of Onset     Cancer Mother 69        lung, smoker     Cancer Maternal Uncle 65        brain     Colon Cancer Maternal Aunt 80        colon       Review of Systems   The 10 point Review of Systems is negative other than noted in the HPI or here.       Physical Exam   Temp: 98.3  F (36.8  C) Temp src: Oral BP: 90/45 Pulse: 109 Heart Rate: 103 Resp: 18 SpO2: 93 % O2 Device: None (Room air)    Vital Signs with Ranges  Temp:  [97.9  F (36.6  C)-99.3  F (37.4  C)] 98.3  F (36.8  C)  Pulse:  [107-123] 109  Heart Rate:  [103-123] 103  Resp:  [18-30] 18  BP: (84-96)/(36-61) 90/45  SpO2:  [92 %-98 %] 93 %  0 lbs 0 oz    Middle aged female in no acute distress  Has a bandana covering her recent craniotomy  Pallor+ve  Diminished breath sound in right lower lung posteriorly  Good air entry otherwise  Mild distension in abdomen due to hepatomegaly/tender    Data   Recent Labs   Lab Test 10/25/19  0730 10/25/19  0300 10/24/19  1509 10/22/19  1445 10/20/19  0540 10/19/19  1746   *  --  130* 132* 132* 134   POTASSIUM 4.4 3.6 3.3* 4.0 4.2  4.1   CHLORIDE 102  --  97 99 102 103   CO2 23  --  25 26 25 23   ANIONGAP 6  --  8 7 4 8   BUN 10  --  9 10 7 7   CR 0.54  --  0.56 0.60 0.55 0.62   *  --  133* 115* 96 104*   JOSE ALBERTO 7.8*  --  8.2* 8.5 8.2* 8.1*     Recent Labs   Lab Test 10/25/19  0300 10/24/19  1509 10/22/19  1445 10/20/19  0540 10/19/19  0539 10/18/19  0546 10/17/19  1905   MAG 2.4* 0.9* 1.2* 1.3*  --  1.7  --    PHOS  --   --  3.0 2.7 2.5 3.2 2.1*     Recent Labs   Lab Test 10/25/19  0730 10/24/19  1509 10/18/19  0546 10/17/19  0830 10/03/19  1858 10/03/19  1404 09/24/19  0835   WBC 15.1* 19.3* 5.4 8.3 10.3 12.7* 9.6   HGB 9.7* 11.2* 10.1* 11.8 11.2* 11.5* 9.9*    326 342 412 263 259 425   MCV 89 90 93 91 97 95 95   NEUTROPHIL  --  93.9  --  70.1 69.1 74.3 64.3     Recent Labs   Lab Test 10/18/19  0546 10/17/19  0830 10/03/19  1404  08/10/17  0842 07/13/17  0855 06/15/17  1352   BILITOTAL 0.8 1.3 1.2   < > 0.4 0.4 0.5   ALKPHOS 825* 970* 955*   < > 109 110 119   ALT 30 44 90*   < > 24 25 24   AST 25 40 57*   < > 22 20 22   ALBUMIN 1.7* 2.0* 2.5*   < > 3.8 3.5 3.7   LDH  --   --   --   --  147 140 140    < > = values in this interval not displayed.     TSH   Date Value Ref Range Status   01/03/2013 5.83 (H) 0.4 - 5.0 mU/L Final     Recent Labs   Lab Test 01/17/12  1532   CEA 1.0     Results for orders placed or performed during the hospital encounter of 10/24/19   CT Chest Pulmonary Embolism w Contrast    Narrative    CT CHEST PULMONARY EMBOLISM WITH CONTRAST  10/24/2019 4:53 PM     HISTORY: Dyspnea, malignancy, elevated troponin.    COMPARISON: September 9, 2019    TECHNIQUE: Volumetric helical acquisition of CT images of the chest  from the lung apices to the kidneys were acquired after the  administration of 51mL Isovue-370  IV contrast. Radiation dose for  this scan was reduced using automated exposure control, adjustment of  the mA and/or kV according to patient size, or iterative  reconstruction technique.    FINDINGS: There is  no pulmonary embolism. Large right and small left  pleural effusion with extensive compressive atelectasis. No  pericardial effusion. The heart is not enlarged. Thoracic aorta is  atherosclerotic without evidence of dissection or aneurysm. There is  no pleural or pericardial effusion.  There is no pneumothorax. No  acute findings in the visualized upper abdomen. Ascites noted.      Impression    IMPRESSION:   1. No pulmonary embolism demonstrated.  2. No thoracic aortic dissection or aneurysm.   3. Large right and small left pleural effusion with associated  compressive atelectasis, both effusions are increased from previous.    SEEMA PLATA MD   Echo Limited    Narrative    [id number]  [id number]  [id number]  [id number]^SUSANNE^ILAN^GISEL           [facility]  Echocardiography Laboratory  [address] [place] [address]        Name: MAURY MERA  MRN: [id number]  : 1960  Study Date: 10/25/2019 07:19 AM  Age: 59 yrs  Gender: Female  Patient Location: [facility]  Reason For Study: CAD  Ordering Physician: ILAN SKINNER  Referring Physician: Aubrie Hester  Performed By: Lance Hill RDCS     BSA: 1.6 m2  Height: 65 in  Weight: 120 lb  HR: 100  BP: 90/45 mmHg  _____________________________________________________________________________  __        Procedure  Limited Echo Adult.  _____________________________________________________________________________  __        Interpretation Summary     1. The left ventricle is normal in structure, function and size. The visual  ejection fraction is estimated at 60%. Normal left ventricular wall motion  2. The right ventricle is normal in structure, function and size.  3. No valve disease.     No changes from echo 19.  _____________________________________________________________________________  __        Left Ventricle  The left ventricle is normal in structure, function and size. There is normal  left ventricular wall thickness. The  visual ejection fraction is estimated at  60%. Normal left ventricular wall motion.     Right Ventricle  The right ventricle is normal in structure, function and size.     Mitral Valve  There is trace to mild mitral regurgitation.     Tricuspid Valve  There is mild (1+) tricuspid regurgitation. The right ventricular systolic  pressure is approximated at 27.2 mmHg plus the right atrial pressure.        Aortic Valve  The aortic valve is normal in structure and function.     Vessels  Small inferior vena cava size consistent with hypovolemia.     Pericardium  There is no pericardial effusion.     Rhythm  Sinus rhythm was noted.     _____________________________________________________________________________  __  MMode/2D Measurements & Calculations  asc Aorta Diam: 2.8 cm        Doppler Measurements & Calculations  MV E max polly: 67.1 cm/sec  MV A max polly: 88.4 cm/sec  MV E/A: 0.76  MV dec slope: 370.0 cm/sec2  MV dec time: 0.16 sec  TR max polly: 261.0 cm/sec  TR max P.2 mmHg  E/E' avg: 10.1     Lateral E/e': 8.5  Medial E/e': 11.7           _____________________________________________________________________________  __           Report approved by: Courtney Huynh 10/25/2019 08:07 AM        *Note: Due to a large number of results and/or encounters for the requested time period, some results have not been displayed. A complete set of results can be found in Results Review.

## 2019-10-25 NOTE — PROGRESS NOTES
"Critical access hospital [facility]     Date:10/24/2019    Admission Dx:Pleural Effusion    Pulmonary History:recurrent pleural effusion    Home Nebulizer/MDI Use:Alb mdi 2p Q6 prn, Duoneb Q6 prn    Home Oxygen:No    Acuity Level (RCAT flow sheet):3    Aerosol Therapy initiated:Duoneb QID, Alb Q2 prn      Pulmonary Hygiene initiated:Good NPC      Volume Expansion initiated:IS      Current Oxygen Requirements:RA  Current SpO2:96%    Re-evaluation date:10/27/2019    Patient Education:Education was performed with the patient in regards to indications/benefits and possible side effects of bronchodilators. Will continue to do education with patient.           See \"RT Assessments\" flow sheet for patient assessment scoring and Acuity Level Details.             "

## 2019-10-25 NOTE — CONSULTS
CTS identifies patient as high risk due to elevated DELL score. Currently admitted for right pleural effusion, this is her 5th admission in 6 months. She has had 1 ED-only visit in 6 months. Per chart review, pt resides at home alone. Baseline mobility is stand by assist. She is currently an assist of 1.      Patient has multiple care team members in Oncology, Neurology, Neurosurgery, Surgery, Gastroenterology & Urology.    Her PMH shows she has 10 comorbidities, including a new diagnosis of brain metastasis.     Her PTA medications that can effect her DELL score include MS Contin, medical cannabis & Oxycodone.     Will follow along with [facility] for DC planning. Will give hand off to clinic RN CTS at time of discharge and assist in making follow up appointments.      Jamila Campbell RN, BSN, CPHN, CTS  [facility]  [phone]

## 2019-10-26 PROBLEM — E87.6 HYPOKALEMIA: Status: ACTIVE | Noted: 2019-01-01

## 2019-10-26 PROBLEM — E83.39 HYPOPHOSPHATEMIA: Status: ACTIVE | Noted: 2019-01-01

## 2019-10-26 NOTE — PROGRESS NOTES
Pt to discharge home this evening. All discharge instructions were reviewed with patient. Patient verbalized understanding and questions were answered at this time. All belongings were sent home with patient at discharge.

## 2019-10-26 NOTE — PROGRESS NOTES
Service Date: 10/26/2019      SUBJECTIVE:  Ms. Nathan is a 59-year-old female with metastatic ovarian cancer.  The patient follows up with Dr. Yeung at [facility].  She has progressed on multiple different agents.  Most recently, she had a brain metastases for which she had Gamma Knife treatment.      The patient presented to the emergency room because of shortness of breath.  CT chest angiogram did not reveal any pulmonary embolism.  There was a large right and small left pleural effusion.      Right thoracocentesis of 1.45 liters was done.  The patient feels better.  Her shortness of breath is better.      No headache.  Some dizziness.  No chest pain.  Shortness of breath is better.  No nausea or vomiting.  Appetite is  decreased.  She feels weak.      PHYSICAL EXAMINATION:   GENERAL:  She was alert. Not in any distress.   VITAL SIGNS:  Reviewed.     The rest of the systems not examined.      LABORATORY DATA:  Reviewed.      ASSESSMENT:   1.  A 59-year-old female with metastatic ovarian cancer with pleural effusion.  This is likely malignant pleural effusion.  She is symptomatically better after thoracocentesis.   2.  Metastatic ovarian cancer.    3.  Normocytic anemia.      PLAN:   1.  The patient is symptomatically better.  I explained to her that her pleural effusion is likely malignant. I explained to the patient her pleural effusion will recur again.  The best way to treat is to control her malignancy.  She is going to see Dr. Yeung be started on some systemic treatment. If pleural effusion is recurrent, one option would be PleurX catheter.  She will discuss this with Dr. Yeung.      2.  The patient has an appointment to see Dr. Yeung of [place]. She is stable to be discharged from Oncology side. We will sign off.  Please call us with any questions.         NATTY GREENE MD             D: 10/26/2019   T: 10/26/2019   MT: PAOLO      Name:     MAURY NATHAN   MRN:      [id number]         Account:      [id number]   :      1960           Service Date: 10/26/2019      Document: [id number]

## 2019-10-26 NOTE — PLAN OF CARE
VSS w\ ex of Hypotension which is baseline for patient- systolic BP in 90s. Alert and oriented x 4. Up SBA. Fair appetite. No c/o pain- received scheduled MS contin at HS. No c/o SOB, n/v/d. Voiding adequately, urine cloudy shola. Tele ST. Ambulated in [place] x 1. Possible discharge tomorrow.

## 2019-10-26 NOTE — DISCHARGE SUMMARY
Discharge Summary    Odalys Nathan MRN# [id number]   YOB: 1960 Age: 59 year old     Date of Admission:  10/24/2019  Date of Discharge:  10/26/2019  Admitting Physician:  Antonio Sawyer DO  Discharge Physician:  Aden Torres MD  Discharging Service:  Hospitalist     Home clinic: [facility]  Primary Provider: Aubrie Hester Diagnosis:   1.  Shortness of breath due to recurrent malignant right pleural effusion; s/p thoracentesis.       2.  Hypokalemia, acute on chronic.  Replaced per potassium replacement protocol.     3.  Hypomagnesemia, acute on chronic.  Replaced per Magnesium replacement protocol.    4.  Hypophosphatemia, acute on chronic.  Replaced per Phosphorous replacement protocol.      5.  Mildly elevated troponin level.  Suspect demand ischemia from large right pleural effusion and respiratory difficulty. Echo showed normal EF and no WMA.      6.  Metastatic ovarian cancer (mets to liver and brain).      7.  Biliary stent in place with plans to convert to metal stent in near future.    8. Ureter stent in place with some hematuria.     9.  Recent klebsiella bacteremia of unclear source; on prolonged course of Augmentin.     10.  Chronic pain syndrome managed with chronic opiate therapy.     11.  Hypothyroidism.       12.  GERD.      13.  Severe malnutrition.                Discharge Disposition:   Discharged to home           Allergies:   No Known Allergies             Condition on Discharge:   Discharge condition: Stable   Discharge vitals: Blood pressure (!) 88/46, pulse 109, temperature 97.8  F (36.6  C), temperature source Oral, resp. rate 16, weight 55.7 kg (122 lb 14.4 oz), SpO2 93 %, not currently breastfeeding.   Code status on discharge: Full Code   Physical exam on day of discharge:   GENERAL:  Comfortable. Cooperative.  PSYCH: pleasant, oriented, No acute distress.  EYES: PERRLA, Normal conjunctiva.  HEART:  Regular rate and  rhythm. No JVD. Pulses normal. No edema.  LUNGS:  Clear to auscultation, normal Respiratory effort.  ABDOMEN:  Soft, no hepatosplenomegaly, normal bowel sounds.  EXTREMETIES: No clubbing, cyanosis or ischemia  SKIN:  Dry to touch, No rash.         History of Present Illness and Hospital Course:     See detailed admission note for full details.  Odalys Nathan is a 59 year old female admitted on 10/24/2019. She has history of metastatic ovarian cancer (mets to liver and brain), biliary stent (still in place), ureter stent (still in place),  hypothyroidism, malnutrition, failure to thrive, recurrent electrolyte abnormalities, and chronic pain syndrome. She had recent klebsiella bacteremia of unclear source (possibly related to biliary system with liver mets and biliary stent). She has been on a prolonged course of Augmentin for this.  She presented to the emergency department on 10/24/19 with shortness of breath.  She was found to have recurrent right pleural effusion (she had just had thoracentesis on 10/19). She was also found to have mild troponin elevation, hematuria, hypokalemia, hypomagnesemia, and leukocytosis. She was admitted for further evaluation and treatment. Serial troponins were down-trending and echo was unremarkable so troponin elevation was thought to be leak due to pleural effusion and respiratory distress. Hematuria was subacute and attributed to indwelling ureter stent for which she [facility] Electrolytes were replaced. Odalys is feeling well and will discharge home later today after additional magnesium and phosphorous replacement.               Procedures / Imaging:   CT chest  Echocardiogram  US guided thoracentesis           Consultations:   Consultation during this admission received from oncology             Pending Results:   Final report on urine cultures.            Discharge Instructions and Follow-Up:   Discharge diet: Regular   Discharge  activity: Activity as tolerated   Discharge follow-up: Follow up with Oncology in 2 days as previously planned   Outpatient therapy: None    Other instructions: None             Discharge Medications:   Current Discharge Medication List      CONTINUE these medications which have NOT CHANGED    Details   albuterol (PROAIR HFA/PROVENTIL HFA/VENTOLIN HFA) 108 (90 Base) MCG/ACT inhaler Inhale 2 puffs into the lungs every 6 hours as needed for shortness of breath / dyspnea or wheezing  Qty: 1 Inhaler, Refills: 3    Comments: Pharmacy may dispense brand covered by insurance (Proair, or proventil or ventolin or generic albuterol inhaler)  Associated Diagnoses: Ovarian cancer, unspecified laterality (H); Wheezing      amoxicillin-clavulanate (AUGMENTIN) 875-125 MG tablet Take 1 tablet by mouth 2 times daily  Qty: 60 tablet, Refills: 1    Associated Diagnoses: Cholangitis      cyanocobalamin (VITAMIN B12) 1000 MCG/ML injection Inject 1 mL (1,000 mcg) into the muscle every 30 days  Qty: 1 mL, Refills: 11    Associated Diagnoses: B12 deficiency      diphenoxylate-atropine (LOMOTIL) 2.5-0.025 MG per tablet Take 2 tablets by mouth 4 times daily as needed for diarrhea  Qty: 60 tablet, Refills: 0    Associated Diagnoses: Acute diarrhea      dronabinol (MARINOL) 2.5 MG capsule Take 1 capsule (2.5 mg) by mouth 2 times daily (before meals)  Qty: 60 capsule, Refills: 3    Associated Diagnoses: Ovarian cancer, right (H); Ovarian cancer, left (H); Poor appetite      HEMP OIL OR EXTRACT OR OTHER CBD CANNABINOID, NOT MEDICAL CANNABIS,       ipratropium - albuterol 0.5 mg/2.5 mg/3 mL (DUONEB) 0.5-2.5 (3) MG/3ML neb solution Take 1 vial (3 mLs) by nebulization every 6 hours as needed for wheezing or shortness of breath / dyspnea  Qty: 3 Box, Refills: 0    Associated Diagnoses: Brain tumor (H)      LEVOTHYROXINE SODIUM PO Take 50 mcg by mouth daily       loperamide (IMODIUM) 2 MG capsule Take 2 mg by mouth 4 times daily as needed for  diarrhea      LORazepam (ATIVAN) 1 MG tablet Take 1 tablet (1 mg) by mouth every 6 hours as needed (Anxiety, Nausea/Vomiting or Sleep)  Qty: 30 tablet, Refills: 2    Associated Diagnoses: Ovarian cancer, unspecified laterality (H)      magnesium oxide (MAG-OX) 400 MG tablet Take 1 tablet (400 mg) by mouth 2 times daily  Qty: 90 tablet, Refills: 3    Associated Diagnoses: Ovarian cancer, unspecified laterality (H)      medical cannabis (Patient's own supply) (The purpose of this order is to document that the patient reports taking medical cannabis.  This is not a prescription, and is not used to certify that the patient has a qualifying medical condition.)  Qty: 0 Information only, Refills: 0      morphine (MS CONTIN) 15 MG CR tablet Take 1 tablet (15 mg) by mouth every 12 hours  Qty: 120 tablet, Refills: 0    Associated Diagnoses: Metastatic cancer (H)      naloxone (NARCAN) 4 MG/0.1ML nasal spray Spray 1 spray (4 mg) into one nostril alternating nostrils once as needed for opioid reversal Every 2-3 minutes until patient responsive or EMS arrives  Qty: 0.2 mL, Refills: 0    Associated Diagnoses: Chronic pain due to neoplasm      ondansetron (ZOFRAN-ODT) 4 MG ODT tab Take 1-2 tablets (4-8 mg) by mouth every 6 hours as needed for nausea or vomiting  Qty: 20 tablet, Refills: 0    Associated Diagnoses: Brain tumor (H)      oxyCODONE (ROXICODONE) 5 MG tablet Take 1 tablet (5 mg) by mouth every 6 hours as needed for severe pain  Qty: 30 tablet, Refills: 0    Associated Diagnoses: Ovarian cancer, right (H); Ovarian cancer, left (H); Chronic pain due to neoplasm      pantoprazole (PROTONIX) 40 MG EC tablet Take 40 mg by mouth daily as needed for heartburn      polyethylene glycol (MIRALAX/GLYCOLAX) packet Take 1 packet by mouth daily      potassium chloride (KLOR-CON) 20 MEQ packet Take 20 mEq by mouth daily      prochlorperazine (COMPAZINE) 10 MG tablet Take 1 tablet (10 mg) by mouth every 6 hours as needed for nausea or  vomiting  Qty: 30 tablet, Refills: 1    Associated Diagnoses: Ovarian cancer, right (H); Nausea      tamsulosin (FLOMAX) 0.4 MG capsule Take 1 capsule (0.4 mg) by mouth daily For stent discomfort  Qty: 30 capsule, Refills: 11    Associated Diagnoses: Stricture or kinking of ureter      Ascorbic Acid (VITAMIN C PO) Take 500 mg by mouth daily     Associated Diagnoses: Pelvic mass      Calcium Carbonate-Vitamin D (CALCIUM + D PO) Take 1 tablet by mouth daily.    Associated Diagnoses: Pelvic mass      clotrimazole 10 MG rell Take 1 Rell (10 mg) by mouth 5 times daily  Qty: 70 Rell, Refills: 0    Associated Diagnoses: Thrush      Ferrous Sulfate 324 (65 Fe) MG TBEC TAKE ONE TABLET BY MOUTH THREE TIMES DAILY WITH MEALS. TAKE WITH A SMALL AMOUNT OF ORANGE JUICE. DO NOT TAKE WITH CALCIUM  Qty: 90 tablet, Refills: 11    Associated Diagnoses: Ovarian cancer, right (H); Anemia, unspecified type; Ovarian cancer, left (H)      HERBALS daily       order for DME Injection Supplies for Vitamin B12: 3cc syringes w/ 27 gauge needles, 1/2 inch length  Qty: 3 each, Refills: 0    Associated Diagnoses: B12 deficiency      VITAMIN E NATURAL PO Take 100 Units by mouth daily                Total time spent in face to face contact with the patient and coordinating discharge was:  35 Minutes

## 2019-10-26 NOTE — PLAN OF CARE
VS stable. Blood pressure low at 92/53. Diminished LS with non productive cough. No complaints of pain. Tele is sinus tach. Slept well throughout the night.

## 2019-10-26 NOTE — PLAN OF CARE
Low BP's 71/46, rechecks 83/48 and 88/46. Bolus given, BP 73/38 recheck 93/57. Denies pain. Tele SR-ST. Mag 1.6 and phosphorous 2.7, replaced per protocol. Magnesium recheck 2.4. SBA. Poor appetite on regular diet, denies nausea. Voiding in BR. Discharge home after phosphorous replacement per MD.

## 2019-10-26 NOTE — PROVIDER NOTIFICATION
[facility] called r/t pleural fluid collected as only gram stain was ordered and not cultures. Admitting paiged to add on culture order. Will follow up w/ [facility] lab if and when add on is placed.

## 2019-10-26 NOTE — PROVIDER NOTIFICATION
Dr. Torres paged: FYI: Mag recheck 2.4. Post bolus R arm BP 73/38, L arm recheck 93/57.    Addendum: per MD pt okay to discharge after phosphorous infusion complete.

## 2019-10-28 PROBLEM — R17 JAUNDICE: Status: ACTIVE | Noted: 2019-01-01

## 2019-10-28 NOTE — NURSING NOTE
"Oncology Rooming Note    October 28, 2019 5:00 PM   Odalys Nathan is a 59 year old female who presents for:    Chief Complaint   Patient presents with     Oncology Clinic Visit     Return; Ovarian Ca     Initial Vitals: /61   Pulse 123   Temp 98.1  F (36.7  C) (Oral)   Resp 13   Ht 1.651 m (5' 5\")   Wt 57.3 kg (126 lb 6.4 oz)   SpO2 100%   BMI 21.03 kg/m   Estimated body mass index is 21.03 kg/m  as calculated from the following:    Height as of this encounter: 1.651 m (5' 5\").    Weight as of this encounter: 57.3 kg (126 lb 6.4 oz). Body surface area is 1.62 meters squared.  No Pain (0) Comment: Data Unavailable   No LMP recorded. Patient has had a hysterectomy.  Allergies reviewed: Yes  Medications reviewed: Yes    Medications: Medication refills not needed today.  Pharmacy name entered into [facility]:    [id number] - [place], [place] - [address]  [facility] [place], [place] - [address] [facility] [place], [place] - [address] [facility]    Clinical concerns: No new concerns       Sis Youngblood CMA              "

## 2019-10-28 NOTE — PATIENT INSTRUCTIONS
Breast Cancer Screening: During our visit today, we discussed that it is recommended you receive breast cancer screening. Please call or make an appointment with your primary care provider to discuss this with them. You may also call the [facility] scheduling line ([phone]) to set up a mammography appointment at the [facility] within the [facility].

## 2019-10-28 NOTE — LETTER
10/28/2019       RE: Odalys Nathan  [id number]  [facility] [address]     Dear Colleague,    Thank you for referring your patient, Odalys Nathan, to the [facility]. Please see a copy of my visit note below.    Gynecologic Oncology Follow-Up Note    RE: Odalys Nathan  MRN: [id number]  : 1960  Date of Visit: 10/28/2019    CC: Odalys Nathan is a 59 year old year old female with recurrent ovarian cancer who presents today for follow up regarding disease management.    HPI: Odalys comes to the clinic accompanied by her  Marcos. Odalys comes to the clinic feeling fair- she recently underwent surgical resection of a brain metastasis followed by gamma knife radiation. She was also recently hospitalized for Klebsiella bacteremia of unknown source, though she does have a biliary stent. She also has a recently placed ureteral stent. On Augmentin due to concern that biliary stent may be the source of her bacteremia.  Pain is better in LUQ. On MS Contin 15 mg bid, medical cannabis in between, taking laxatives on a regular basis, if not going takes Miralax. Started noticing jaundice yesterday. No nausea or vomiting. Fatigued. Urine looks coffee colored per patient. cutures were negative 10/24/19. Was to go in 11/15 for replacement of biliary stent  Losing weight, food does not taste good, tolerating Boost twice daily, tries chicken broth.      Oncology History:  2012 - Admitted to hospital for 2 weeks of intermittent abdominal cramping, distention, diarrhea and N/V. CT of abdomen/pelvis significant for small bowel obstruction, a heterogenous soft tissue density in the pelvis, omental nodules, and ascites. Bilateral adnexal masses per U/S of pelvis. CA-125 was elevated at 987, CEA was normal at 1.0.    12 - Therapeutic paracentesis (4 L) with cytology confirming malignancy (NJ positive, weak ER positive, CK-7 positive consistent with GYN primary).  Surgery recommended d/t potential for falling blood counts 2/2 chemotherapy (patient is Sabianist and limiting blood transfusion)    2/1/12 - Exploratory laparotomy, MAR BSO, lysis of adhesion, Appendectomy, Repair cystotomy, Omentectomy Ddxu-xeflgr-udghbtxxc-transverse-colon resection, Ileo-descending colon anastomosis, CUSA, & Colonoscopy (done by Dr. Amato and colorectal team).  2/20/2012 - Admitted to hospital for bilateral pulmonary emboli and drainage of pleural effusion. Started on Lovenox.  2/28/12-3/21/12: Cycle #1-2 Carbo/Taxol IV  3/29/12 - Started on Keflex by her PCP for infection in her healing wound (immediately below her umbilicus).    4/11/12-6/14/12: Cycle #3-6 IV chemo, Cycle #1 IV/IP chemo  7/16/12 -  8, CT PRADEEP - enrolled in  - observation arm  3/4/13-6/28/13:  6, 9, 12, 15, 20.  7/1/13: CT Chest, Abdomen, Pelvis IMPRESSION:  1. Worsening metastatic ovarian carcinoma suggested by increased size of soft tissue nodules anterior to the right psoas muscle, that may represent growing mesenteric lymphadenopathy.  2. Remaining prominent right lower quadrant mesenteric lymph nodes are not significantly changed from CT 7/16/2012.  3. Clustered nodular opacities in the right lower lobe are not significant change from 7/16/2012 and remain indeterminate. Again, the appearance and distribution is suggestive of an infectious etiology.  4. Stable 8 mm soft tissue nodule in left breast, unchanged since at least 02/20/2012. This can be observed on followup studies, but correlation with mammography could be considered.  Decision made to start Doxil/Carbo.  7/11/13: The left ventricular ejection fraction is normal at 66.4%.  7/12/13-9/6/13: Cycle #1-3 Doxil/Carbo.  10, 11.    9/30/13 CT C/A/P Impression:  1. Overall, favorable response to treatment with decreasing size of soft tissue nodules tracking along the anterior aspect of the right psoas muscle.  2. 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  3. Improved cluster of right lower lobe pulmonary nodular opacities. These may represent resolving infection.  10/3/13-12/9/13:  9, 8, 10. Cycle 4-6 Doxil/Carbo.    1/13/14 CT C/A/P Impression:   1. Stable appearance of metastatic ovarian cancer. Scattered soft tissue nodules along the anterior aspect of the right psoas muscle are unchanged in size. Mild mesenteric lymphadenopathy is unchanged.  2. Clustered micronodules in the right lower lobe are unchanged from 9/30/2013, but improved from 7/1/2013. This history suggests a postinflammatory/postinfectious etiology.  3. 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  1/16/14 Discussed multiple options for her based on relatively stable-appearing disease on CT but slight increase in  (which has had small increase to 20 with last recurrence) including chemo break with recheck of  in 1 month, starting new chemo agent immediately, and exploratory surgery with possible resection of nodules. She is considered platinum-sensitive based on > 1 year remission after Taxol/Carbo, which we will take into consideration for future chemo planning. I am not inclined to surgery at this time given difficulty she already has with diarrhea secondary to past colon resection. I suspect we would need to resect further bowel due to mesenteric disease. Also explained inherent risks of any major surgery. Also mentioned maintenance chemo, but this has not been shown to increase overall survival and would likely decrease her quality of life without significant benefit. Family is going on vacation to [place] in 2 weeks and Odalys does not want to have chemo prior to that, so will plan to take 1 month break. She can have  at that time (discussed checking toady since last draw was in early December, but as it would likely not change treatment plan and she has h/o slow rising , will not check today).  2/17/14  16    2/20/14: Decision to  "take break from chemo for two months, followed by CT and CA-125.    4/21/14  27  4/21/14 CT C/A/P Impression:    1. Increased size of 2 low-attenuation lymph nodes anterior to the right psoas muscle is concerning for worsening metastatic ovarian cancer.    2. New circumferential thickening of a 3.8 cm length segment of distal transverse colon is likely physiologic. Recommend attention on followup imaging.    3. Grossly unchanged size of clustered small nodules versus scarring in the right lower lobe the lungs.    4. Stable thrombosis of the right ovarian vein.  5/14/14: Diagnostic laparoscopy converted to exploratory laparotomy and removal of mesenteric masses, tumor debulking, peritoneal biopsies and intraperitoneal port placement. On laparoscopy, it was noted that there were small nodules on the anterior abdominal wall near the previous incision, small nodules on the right pelvic sidewall as well. Nodules were palpated in the mesentery; however, as it was unable to clarify where the origin of the nodules was, the decision was made to open the patient. On opening there was found to be approximately a 3 cm nodule in the small bowel mesentery and another separate approximately 2 cm nodule in the bowel mesentery. Pelvis without evidence of cancer, some mesenteric lymph nodes were palpated. No evidence otherwise of any disseminated cancer throughout the abdomen.    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 (2/2)  -Largest metastasis: 1.3 cm  -See comment  D: Peritoneum, right paracolic gutter #2, biopsy:  -Fibroadipose tissue with granulomatous inflammation " "surrounding refractile material  -Negative for malignancy   E: Small bowel adhesion, biopsy:  -Fibroconnective tissue, consistent with adhesion  -Negative for malignancy  F: Lymph nodes, mesentry, not otherwise specified, excision:  -Two lymph nodes, negative for metastatic carcinoma (0/2)  G: Lymph node, mesentery, \"#2\", excision:  -One lymph node, negative for metastatic carcinoma (0/1)  H: Lymph nodes, mesentery, \"nodule #2\", excision:  -Five lymph nodes, negative for metastatic carcinoma (0/5)  COMMENT:  Some of the specimens show post-operative changes. Others show possible treatment related changes, including necrosis. The metastatic carcinoma in the mesenteric lymph nodes (specimen C) shows variable morphology, including relatively low grade tumor with papillary architecture, and high grade tumor comprised of nests of tumor cells with irregular, slit-like spaces and marked nuclear pleomorphism.    5/29/14: Cycle 1 IV PACLitaxel / IP CISplatin / IP PACLitaxel.  - 28.  6/26/14: Cycle #2 IV/IP.  10  8/5/14: CT chest/abd/pelvis IMPRESSION     1. In this patient with ovarian cancer, overall findings are indicative of stable/slight improvement, as multiple mesenteric lymphadenopathy and scattered nodular peritoneal soft tissue mass lesions appear unchanged or slightly smaller since 4/21/2014.    2. 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 Mild dilatation of the second and the third portion of the duodenum with a narrow SMA angle. This could represent SMA syndrome, if clinically correlated.17/14:    Cycle #3 IV/IP.  10.    CT chest/abd/pelvis with contrast on 8/5/14    Impression:    1. In this patient with ovarian cancer, overall findings are indicative of stable/slight improvement, as multiple mesenteric lymphadenopathy and scattered nodular peritoneal soft tissue mass lesions appear unchanged or slightly smaller since 4/21/2014.    2. Unchanged chronic thrombosis of the right " ovarian vein    3. Mild dilatation of the second and the third portion of the duodenum with a narrow SMA angle. This could represent SMA syndrome, if clinically correlated.  8/7/14: Cycle #4 Taxol/Carbo (changed from IV/IP).  10. She has been feeling okay. She is unsure if she can finish out the course of 6 cycles IV/IP taxol/cisplatin. She feels like she has the flu for about a week then starts feeling gradually better after each chemo cycle. Her spouse notes that she actually has been more sick with the treatments than she initially admits here. She was also previously having some rib pain. Denies any rib pain now. Denies any chest pain or shortness of breath.  Plan: discussed recent CT cap results and switching to just IV as she is feeling miserable with IP treatments. Switch to IV carbo/taxol as patient is platinum sensitive.  We also discussed her taking part of the tesaro trial, which would require BRCA testing. She would like to take part in this trial if eligible.  8/20/14: Remove Intraperitoneal Port ( Port and catheter intact - discarded)  8/28/14: Cycle #5 Taxol/Carbo held due to thrombocytopenia.  6.    She denies any vaginal bleeding, no changes in her bowel or bladder habits, no nausea/emesis, no lower extremity edema, and no difficulties eating or sleeping. She denies any abdominal discomfort/bloating, no fevers or chills, and no chest pain or shortness of breath. She states her diarrhea is the same. She reports some fatigue which improves about 1-2 weeks after her chemotherapy. She states she does not need any medication refills and she was told she does not meet the criteria for the TESARO trial. She states she has 3 bags of iv fluids left over from her previous chemotherapy and will give these to herself. She states she is ready for her treatment today.    9/29/14: Cycle #6 Taxol/Carbo  6. Insurance questions regarding GSF coverage today. No concerns other than fatigue. Taking  iron for anemia and does not desire blood transfusion. Using neulasta for neutropenia. Using home IV hydration if needed. Baseline unchanged. No abdominal bloating, constipation, diarrhea, pain, vaginal or rectal bleeding, cough or dyspnea, fluid retention.    10/16/14: Impression:    1. Nodular peritoneal soft tissue mass in the right lower quadrant adjacent to the psoas muscle is no longer appreciated. Adjacent prominent lymphadenopathy is unchanged from previous exam. No new peritoneal lesions.    2. 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     10/20/14:  5. CT chest/abdomen/pelvis on 10/16/14 showed nodular peritoneal soft tissue mass in the right lower quadrant adjacent to the psoas muscle is no longer appreciated. Adjacent prominent lymphadenopathy is unchanged from previous exam. 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  1/27/15:  6.  4/28/15:  14.  5/26/15:  18.  6/2/15: CT cap Impression:  1. Postsurgical changes of hysterectomy and bilateral salpingo-oophorectomy for ovarian cancer. There is a new 8 mm hazy, ill-defined hypoattenuating lesion in hepatic segment 6 which is suspicious for a metastatic deposit. Further evaluation with ultrasound in recommended.    2. Increased size of a left retroperitoneal lymph node which is indeterminate but may represent a ciro metastasis. Mildly prominent lymph nodes in the right lower quadrant are not significantly changed.  3. Moderate colonic stool burden.    6/4/15: US abdomen IMPRESSION:    Hyperechoic lesion in the right hepatic lobe, consistent with hemangioma. This does not corresponds to the area of the lesion seen on CT from 6/2/2015. An MR would be helpful for identifying and characterizing the lesion from the recent CT.  6/12/15: MR abd IMPRESSION:  1. New 20 x 11 mm enhancing lesions between the right obliques, concerning for metastatic disease. This lesions should be  amenable to percutaneous biopsy, if indicated.  2. Correlating to the lesion visualized on comparison CT is a hepatic segment 6 subcapsular 7 mm lesion. Overall the appearance favors the diagnosis of a simple cyst. However, there is faint suggestion of mild peripheral arterial enhancement. Although this is favored as  artifactual, this should be followed up to confirm stability. Recommend 6 month followup.  3. Hepatic segment 6, 5 mm lesion too small to technically characterize. Differential would favor FNH, less likely flash filling hemangioma. Recommend attention on followup.  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      9/1/15: Cycle #1 Avastin/Cytoxan.   31.      9/24/15:feeling generally well. She says she has been having back and stomach spasms. She is taking cytosine daily (she ran out yesterday). She also says its affecting her voice. Admits that it burns occasionally. She is eating and drinking normal. She also admit diarrhea, 5-7 times daily, lose/watery. She trying to stay hydrated and eat fiber. She also says that her body is sore, especially the bottom of her feet. Her blood pressure is normal. She also admits having headache after her first infusion.       9/24/15: Cycle #2 Avastin/Cytoxan.  19.  10/15/15: Cycle #3 Avastin/Cytoxan.  16.      11/6/15: CT c/a/p IMPRESSION:    1. Stable postoperative change of MAR/BSO for ovarian cancer.  2. The lesion in the right flank abdominal musculature is slightly decreased in size. Otherwise, stable examination.  2. No evidence of metastatic disease in the chest.     11/20/15: Treatment planning visit,  16     11/25/15: surgical pathology report  FINAL DIAGNOSIS:  Soft tissue, right oblique muscle mass, excision:  -Recurrent ovarian serous carcinoma  -Carcinoma is present less than 1 mm from one resection  margin  -Background skeletal muscle and fibroadipose tissue     1/4/16:  22  1/11/16-1/27/16: Radiation to right flank x 12 treatments  4/7/2016:  94. CT cap IMPRESSION:    In this patient with ovarian cancer status post MAR/BSO and descending/transverse colectomy:  1. No evidence for malignancy in the chest, abdomen, or pelvis.  2. Stable small hypodense segment 6 liver lesion, appears more likely benign, possibly a cyst.  5/13/16:  124.  6/3/16: PET CT IMPRESSION: In this patient with a history of ovarian cancer:  1. Hypermetabolic and enlarging periaortic and perihepatic lymphadenopathy compatible with metastatic disease, as detailed above.  2. Although hypodense lesion in hepatic segment 6 has been present since 6/2/2015 associated hypermetabolism makes this lesion highly concerning for metastatic disease.     Plan: to start Niraparib under TESARO study.      6/9/16:  137.  6/14/16: Cycle #1 Niraparib.    6/28/16: Cycle #1 D15 Niraparib.    7/5/16:  100.    7/11/16: Cycle #2 Nraparib.   83.     8/3/2016: PET CT IMPRESSION:    In this patient with known history of ovarian cancer:  1) New pleural based nodular opacities in the lateral and inferior aspects of the bilateral lower lobes, worse in the left lung. Likely infection. Close follow up is recommended.      2) Slight decrease in hypermetabolic abdominal lymphadenopathy. 2 hypermetabolic lymph nodes persist.  3) Unchanged right hepatic lobe metastatic lesion.      8/9/16: Cycle #3 Niraparib.  69.  9/6/16: Cycle #4 Niraparib.  53. Dose held due to anemia.  9/13/16: Eval for potential cycle 4 niraparib. Dose held due to anemia.  10/4/16: CT CAP impression:  IMPRESSION: In this patient with a known history of ovarian cancer:  1. There has been interval resolution of pleural-based nodular  opacities which likely represented infection.  2. Abdominal lymphadenopathy in the form of 2 portacaval lymph nodes  have not  significantly changed in size, noted to be hypermetabolic on  prior PET/CT.  3. Previously demonstrated metastatic lesion in the right lobe of the  liver is not significantly changed.  10/11/16:  60  11/1/16: C6 niraparib,  75  11/29/16: C7 niraparib.  78. CT CAP impression as follows:  Target lesions (RECIST criteria):       A previously described target lesion superior to the head of the  pancreas (series 2, image 64)  (referred to as a perihepatic node on  6/3/2016) may not be a valid target lesion because it measured less  than 1.5 cm originally. However, this particular node has decreased in  size, now measuring 7 mm in short axis versus 14 mm on 6/3/2016 when  measured in a similar fashion.       2.3 cm short axis portacaval lymph node on series 2 image 67,  previously 2.0 cm on 10/4/2016.       1.2 cm subtle hypodensity in hepatic segment 6 on series 2 image 75,  stable on multiple studies since at least 6/3/2016     Sum of diameters today: 3.5 cm. Sum of diameters 10/4/2016: 3.2 cm.  Growth = 9%.    12/27/16: C8 niraparib.  105.  1/25/17: C9 niraparib.  108.  2/23/17: C10 niraparib.  132.   CT CAP impression:  Sum of target lesion diameters today: 3.7 cm. Sum of target lesion  diameters on 11/28/2016: 3.5 cm. Growth= 6%  1. In this patient with history of ovarian cancer there is stable  disease by RECIST criteria as evidenced by:   1a. Mildly increased size of liver metastasis.  1b. Stable portacaval lymphadenopathy.  1c. No evidence of metastatic disease in the chest.  2. Trace emphysematous changes of the lungs.  3/22/17: C11 niraparib.  132.  4/19/17: C12 niraparib.  127.  5/16/17: CT CAP IMPRESSION: In this patient with ovarian cancer:  1. Mildly increased size of hepatic metastasis segment 6 with subtle increased capsular retraction.  2. Stable edmundo hepatis nodes, with mild increase in size of periaortic lymph nodes.  3. Prominent left supraclavicular  lymph node with subtle increase in size compared to prior studies, particularly comparing to 10/4/2016.  4. Mild subtle groundglass opacities in the right upper lobe, not present on prior study, lesser extent in the right lower lobe.  Findings may represent infection, additional consideration is malignancy (less likely), and attention on follow-up study  Recommended.  Addendum:   Prominent left supraclavicular lymph node (3/25) is stable from most recent CT performed 2/20/2017, currently measuring 14 x 16 mm, previously 14 x 16 mm on 2/20/2017.      The portal caval lymph node/edmundo hepatis lymph node is stable from 2/20/2017, measuring 21 mm.      Clarification of size of the para-aortic lymph node (series 3 image 327). It short axis measurement is 11 mm versus 9 mm on prior study.      Hepatic segment 6 triangular-shaped low density lesion (3/362) measures 14 mm, previously measured 12 mm, demonstrating a  possible/questionable minimal subtle increase in size. Similarly the lymph nodes noted above in the supraclavicular and para-aortic regions demonstrate possible minimal possible subtle increase in size.      5/18/17: Cycle #13 Niraparib.  191.  6/15/17: Cycle #14 Niraparib.  146.  7/13/17: Cycle #15 Niraparib 200 mg.  171     CT (8/9/17):       IMPRESSION:  1. Segment 6 hepatic metastasis is stable to minimally increased in size.  2. Slight increase in multicentric adenopathy, most pronounced at the edmundo hepatis. Additional sites include the inferior left neck/supraclavicular region and retroperitoneum which appear stable to  minimally increased.      8/10/17:  175  9/14/17: MUGA LVEF 54%  9/22/17: C1D1 carboplatin/Doxil.  187.  10/19/17: C2D1 carboplatin/Doxil.  108.  11/17/17: C3D1 carboplatin/Doxil.  82.  12/14/17: C4D1 carboplatin/Doxil/avastin.  83.  Of note, avastin held on C4D14  1/12/18: C5D1 carboplatin/Doxil/avastin.  80.  1/26/18: platelets 61,  patient was not given avastin due to being jehova's witness.   2/9/18: C6D1 carboplatin/Doxil/Avastin.   71.  3/2/18:  49. Three month treatment break.  6/20/2018:  170. CT CAP:  IMPRESSION: In this patient with history of ovarian cancer:  1. Increased size of a right hepatic lobe lesion and numerous edmundo hepatis and retroperitoneal lymph nodes compatible with progression of metastatic disease.  2. New mild intrahepatic biliary ductal dilatation, periportal edema, and pericholecystic fluid. Increased soft tissue fullness in the edmundo hepatis in the expected location of the common hepatic duct. Findings  are suspicious for developing biliary ductal obstruction secondary to worsening metastatic disease in the edmundo hepatis. Correlation with liver function tests is recommended. Right upper quadrant ultrasound  may be beneficial.  3. Unchanged left supraclavicular and right hilar lymphadenopathy in the chest.      8/3/18: C1D1 weekly paclitaxel.  not done.  9/20/18: C2D1 weekly paclitaxel 80mg/m2. Ca 125-56  11/8/2018: C3D1 weekly paclitaxel;  26     12/17/18: Ct cap IMPRESSION:  1. New area of lobulated hypodense nodularity at the far-inferior right liver. This raises the possibility of a new area of hepatic metastasis.  2. Conversely, other nodules within the right liver are smaller suggesting improvement.  3. Improved adenopathy at the abdominal retroperitoneum. Improved left  supraclavicular adenopathy. Stable mildly prominent right hilar lymph nodes.  4. Previously noted ill-defined soft tissue at the edmundo hepatis region is still present but appears less prominent in size.  5. New finding of segmental wall thickening of the proximal sigmoid colon with some fluid distention of the adjacent colon. This could represent a segmental colitis. Other etiologies not excluded.     12/20/18:   19.  1/22/19:  45.  3/20/19: CT cap IMPRESSION:  1. Progression of disease with increasing  size of a right inferior hepatic mass consistent with metastatic disease.  2. Increasing ill-defined multifocal regions of soft tissue at the edmundo hepatis consistent with malignant adenopathy versus malignant implants. Associated increased intrahepatic biliary ductal dilatation.  3. Other areas of progressive adenopathy noted at the left neck base, mediastinum, and abdominal retroperitoneum.  4. New area of carcinomatosis medial to stomach at the left upper abdomen.     3/20/19: Cycle #1 weekly paclitaxel.   180.  5/7/19: Cycle #2 weekly paclitaxel.    142.  6/20/19: Cycle #3 weekly paclitaxel/  259.     7/11/19: CT CAP-IMPRESSION:  1. Slight increased size of the left supraclavicular lymph node.  Slight progression of the mediastinal lymph nodes is also evident. New  periesophageal node as described above. Retroperitoneal nodes are  stable if not slightly smaller. Some of these nodes appear to have  partially calcified suggesting a response to interval therapy.  2. Interval decreased size of the inferior right hepatic lobe lesion  now with an area of central calcification or enhancement. No new liver  lesions. Remaining abdominal organs are grossly unremarkable. No  significant hydronephrosis.  3. Postop changes involving the bowel and pelvic region. No recurrent  pelvic mass.     8/8/19: C1 gemcitabine 800mg/m2 IV days 1&8.  446.    8/23/19: Endoscopic Retrograde Cholangiopancreatogram with 4 mm Hurricane Balloon Dilation, gallbladder stent and Bile Duct stent placements, biliary Sphincterotomy  with Dr. Lloyd. Was to move ERCP to 9/12 but readmitted to hospital       8/29/19: C2 gemcitabine 800mg/m2 IV days 1&8.  548.    10/17/2019-10/20/2019: - Electrolyte abnormalities (Hyponatremia, Hypokalemia, Hypomagnesemia) s/p repletion  - Bilateral pleural effusion, s/p right thoracentesis  - Recurrent ovarian cancer with mets to the liver and brain  - Retroperitoneal and mesenteric  adenopathy  - Failure to thrive   - Severe malnutrition    Recurrent ovarian cancer in the setting of recent resection of brain mets and gamma knife radiation    10/24/2019-10/26/2019: hospital admission-[facility]  1.  Shortness of breath due to recurrent malignant right pleural effusion; s/p thoracentesis.  1.45 liters removed.     2.  Hypokalemia, acute on chronic.  Replaced per potassium replacement protocol.     3.  Hypomagnesemia, acute on chronic.  Replaced per Magnesium replacement protocol.     4.  Hypophosphatemia, acute on chronic.  Replaced per Phosphorous replacement protocol.      5.  Mildly elevated troponin level.  Suspect demand ischemia from large right pleural effusion and respiratory difficulty. Echo showed normal EF and no WMA.      6.  Metastatic ovarian cancer (mets to liver and brain).       7.  Biliary stent in place with plans to convert to metal stent in near future.     8. Ureter stent in place with some hematuria.      9.  Recent klebsiella bacteremia of unclear source; on prolonged course of Augmentin.     10.  Chronic pain syndrome managed with chronic opiate therapy.     11.  Hypothyroidism.       12.  GERD.         Date Value Ref Range Status   10/17/2019 1,993 (H) 0 - 30 U/mL Final     Comment:     Assay Method:  Chemiluminescence using Siemens Centaur XP   10/03/2019 1,398 (H) 0 - 30 U/mL Final     Comment:     Assay Method:  Chemiluminescence using Siemens Centaur XP   08/29/2019 548 (H) 0 - 30 U/mL Final     Comment:     Assay Method:  Chemiluminescence using Siemens Centaur XP   08/08/2019 446 (H) 0 - 30 U/mL Final     Comment:     Assay Method:  Chemiluminescence using Siemens Centaur XP   07/24/2019 425 (H) 0 - 30 U/mL Final     Comment:     Assay Method:  Chemiluminescence using Siemens Centaur XP   07/15/2019 340 (H) 0 - 30 U/mL Final     Comment:     Assay Method:  Chemiluminescence using Siemens Centaur XP   06/20/2019 259 (H) 0 - 30 U/mL Final     Comment:     Assay  Method:  Chemiluminescence using Siemens Centaur XP   05/07/2019 142 (H) 0 - 30 U/mL Final     Comment:     Assay Method:  Chemiluminescence using Siemens Centaur XP   03/20/2019 180 (H) 0 - 30 U/mL Final     Comment:     Assay Method:  Chemiluminescence using Siemens Centaur XP   03/11/2019 147 (H) 0 - 30 U/mL Final     Comment:     Assay Method:  Chemiluminescence using Siemens Centaur XP         Past Medical History:   Diagnosis Date     Antiplatelet or antithrombotic long-term use      Ascites      Blood clot in the legs      Diabetes (H)      Ovarian cancer (H)     serous,stg IV     Pleural effusion      Pulmonary embolism (H) 2/2012     Refusal of blood transfusions as patient is Christian      Short gut syndrome      Subclinical hypothyroidism 4/18/2013     Thrombosis of leg        Past Surgical History:   Procedure Laterality Date     COLECTOMY       COLONOSCOPY  2/1/2012    Procedure:COLONOSCOPY; With Biopsy; Surgeon:TONI SULLIVAN; Location:[facility] OR     CYSTOSCOPY, RETROGRADES, INSERT STENT URETER(S), COMBINED Right 10/4/2019    Procedure: CYSTOSCOPY, WITH RETROGRADE PYELOGRAM AND URETERAL STENT INSERTION;  Surgeon: Wolf Gan MD;  Location: [facility]     ENDOSCOPIC RETROGRADE CHOLANGIOPANCREATOGRAM N/A 8/23/2019    Procedure: Endoscopic Retrograde Cholangiopancreatogram with 4 mm Hurricane Balloon Dilation, gallbladder stent and Bile Duct stent placements, Bilarry Sphincterotomy ;  Surgeon: Catrachito Lloyd MD;  Location: [facility] OR     HYSTERECTOMY TOTAL ABD, RHIANNA SALPINGO-OOPHORECTOMY, NODE DISSECTION, TUMOR DEBULKING, COMBINED  2/1/2012    Procedure:COMBINED HYSTERECTOMY TOTAL ABDOMINAL, BILATERAL SALPINGO-OOPHORECTOMY, NODE DISSECTION, TUMOR DEBULKING;  Exploratory Laparotomy, Total Abdominal Hysterectomy, Bilateral Salpingo-Oophorectomy, appendectomy,lysis of adhesions, ileal, ascending, transverse and splenic flexure resection, ileal descending bowel renanastomosis, incidental cystotomy  repair, CUSA procedure and colonoscopy ; [facility]     INSERT PORT PERITONEAL ACCESS  4/3/2012    Procedure:INSERT PORT PERITONEAL ACCESS; Intraperitoneal Port Placement (c-arm); Surgeon:SAMUEL CARRASCO; Location:[facility]     INSERT PORT PERITONEAL ACCESS  5/14/2014    Procedure: INSERT PORT PERITONEAL ACCESS;  Surgeon: Nga Yeung MD;  Location: [facility] OR     INSERT PORT VASCULAR ACCESS       IR PORT REMOVAL RIGHT  9/20/2019     LAPAROSCOPY DIAGNOSTIC (GYN)  5/14/2014    Procedure: LAPAROSCOPY DIAGNOSTIC (GYN);  Surgeon: Nga Yeung MD;  Location: [facility]     LAPAROTOMY EXPLORATORY Right 11/25/2015    Procedure: LAPAROTOMY EXPLORATORY;  Surgeon: Nga Yeung MD;  Location: [facility]     LAPAROTOMY, TUMOR DEBULKING, COMBINED  5/14/2014    Procedure: COMBINED LAPAROTOMY, TUMOR DEBULKING;  Surgeon: Nga Yeung MD;  Location: [facility]     OPTICAL TRACKING SYSTEM CRANIOTOMY, EXCISE TUMOR, COMBINED Right 9/18/2019    Procedure: Stealth Assisted Right Craniotomy And Tumor Resection;  Surgeon: Bertin Dewey MD;  Location: [facility]     PICC INSERTION Left 09/20/2019    5Fr - 46cm (4cm external), basilic vein, low SVC     REMOVE CATHETER PERITONEAL N/A 8/20/2014    Procedure: REMOVE CATHETER PERITONEAL;  Surgeon: Nga Yeung MD;  Location: [facility]     VASCULAR SURGERY      stent left iliac vein       Current Outpatient Medications   Medication     albuterol (PROAIR HFA/PROVENTIL HFA/VENTOLIN HFA) 108 (90 Base) MCG/ACT inhaler     amoxicillin-clavulanate (AUGMENTIN) 875-125 MG tablet     Ascorbic Acid (VITAMIN C PO)     cyanocobalamin (VITAMIN B12) 1000 MCG/ML injection     diphenoxylate-atropine (LOMOTIL) 2.5-0.025 MG per tablet     dronabinol (MARINOL) 2.5 MG capsule     Ferrous Sulfate 324 (65 Fe) MG TBEC     HEMP OIL OR EXTRACT OR OTHER CBD CANNABINOID, NOT MEDICAL CANNABIS,     HERBALS     ipratropium - albuterol 0.5 mg/2.5 mg/3 mL (DUONEB) 0.5-2.5 (3) MG/3ML neb solution      LEVOTHYROXINE SODIUM PO     loperamide (IMODIUM) 2 MG capsule     LORazepam (ATIVAN) 1 MG tablet     magnesium oxide (MAG-OX) 400 MG tablet     medical cannabis (Patient's own supply)     morphine (MS CONTIN) 15 MG CR tablet     naloxone (NARCAN) 4 MG/0.1ML nasal spray     oxyCODONE (ROXICODONE) 5 MG tablet     pantoprazole (PROTONIX) 40 MG EC tablet     polyethylene glycol (MIRALAX/GLYCOLAX) packet     potassium chloride (KLOR-CON) 20 MEQ packet     prochlorperazine (COMPAZINE) 10 MG tablet     tamsulosin (FLOMAX) 0.4 MG capsule     Calcium Carbonate-Vitamin D (CALCIUM + D PO)     clotrimazole 10 MG shane     ondansetron (ZOFRAN-ODT) 4 MG ODT tab     order for DME     VITAMIN E NATURAL PO     No current facility-administered medications for this visit.        No Known Allergies    Family History   Problem Relation Age of Onset     Cancer Mother 69        lung, smoker     Cancer Maternal Uncle 65        brain     Colon Cancer Maternal Aunt 80        colon       Social History     Socioeconomic History     Marital status:      Spouse name: Not on file     Number of children: Not on file     Years of education: Not on file     Highest education level: Not on file   Occupational History     Not on file   Social Needs     Financial resource strain: Not on file     Food insecurity:     Worry: Not on file     Inability: Not on file     Transportation needs:     Medical: Not on file     Non-medical: Not on file   Tobacco Use     Smoking status: Former Smoker     Packs/day: 0.00     Years: 8.00     Pack years: 0.00     Types: Cigarettes     Last attempt to quit: 1980     Years since quittin.3     Smokeless tobacco: Never Used     Tobacco comment: started at 11 yo and quit at 16 yo   Substance and Sexual Activity     Alcohol use: Yes     Comment: 3x/day wine or jodi     Drug use: No     Sexual activity: Not on file   Lifestyle     Physical activity:     Days per week: Not on file     Minutes per session:  Not on file     Stress: Not on file   Relationships     Social connections:     Talks on phone: Not on file     Gets together: Not on file     Attends Jainism service: Not on file     Active member of club or organization: Not on file     Attends meetings of clubs or organizations: Not on file     Relationship status: Not on file     Intimate partner violence:     Fear of current or ex partner: Not on file     Emotionally abused: Not on file     Physically abused: Not on file     Forced sexual activity: Not on file   Other Topics Concern     Parent/sibling w/ CABG, MI or angioplasty before 65F 55M? Not Asked   Social History Narrative     Not on file           ROS  12 point ROS reviewed and as above.    Lab Results   Component Value Date    WBC 14.3 10/28/2019     Lab Results   Component Value Date    RBC 4.11 10/28/2019     Lab Results   Component Value Date    HGB 12.2 10/28/2019     Lab Results   Component Value Date    HCT 35.9 10/28/2019     Lab Results   Component Value Date    MCV 87 10/28/2019     Lab Results   Component Value Date    MCH 29.7 10/28/2019     Lab Results   Component Value Date    MCHC 34.0 10/28/2019     Lab Results   Component Value Date    RDW 17.1 10/28/2019     Lab Results   Component Value Date     10/28/2019     Last Comprehensive Metabolic Panel:  Sodium   Date Value Ref Range Status   10/28/2019 129 (L) 133 - 144 mmol/L Final     Potassium   Date Value Ref Range Status   10/28/2019 4.1 3.4 - 5.3 mmol/L Final     Chloride   Date Value Ref Range Status   10/28/2019 96 94 - 109 mmol/L Final     Carbon Dioxide   Date Value Ref Range Status   10/28/2019 25 20 - 32 mmol/L Final     Anion Gap   Date Value Ref Range Status   10/28/2019 8 3 - 14 mmol/L Final     Glucose   Date Value Ref Range Status   10/28/2019 113 (H) 70 - 99 mg/dL Final     Urea Nitrogen   Date Value Ref Range Status   10/28/2019 5 (L) 7 - 30 mg/dL Final     Creatinine   Date Value Ref Range Status   10/28/2019  "0.56 0.52 - 1.04 mg/dL Final     GFR Estimate   Date Value Ref Range Status   10/28/2019 >90 >60 mL/min/[1.73_m2] Final     Comment:     Non  GFR Calc  Starting 12/18/2018, serum creatinine based estimated GFR (eGFR) will be   calculated using the Chronic Kidney Disease Epidemiology Collaboration   (CKD-EPI) equation.       Calcium   Date Value Ref Range Status   10/28/2019 9.1 8.5 - 10.1 mg/dL Final     Calcium 9.1   8.5 - 10.1 mg/dL Final 10/28/2019  5:57 PM 1740   Bilirubin Total 12.7  High   0.2 - 1.3 mg/dL Final 10/28/2019  5:57 PM 1740   Albumin 1.6  Low   3.4 - 5.0 g/dL Final 10/28/2019  5:57 PM 1740   Protein Total 6.6  Low   6.8 - 8.8 g/dL Final 10/28/2019  5:57 PM 1740   Alkaline Phosphatase 1,184  High   40 - 150 U/L Final 10/28/2019  5:57 PM 1740     High   0 - 50 U/L Final 10/28/2019  5:57 PM 1740     High   0 - 45 U/L Final 10/28/2019  5:57 PM 1740           Physical Exam:    /61   Pulse 123   Temp 98.1  F (36.7  C) (Oral)   Resp 13   Ht 1.651 m (5' 5\")   Wt 57.3 kg (126 lb 6.4 oz)   SpO2 100%   BMI 21.03 kg/m       CONSTITUTIONAL: Chronically ill but non-toxic appearing female, appears fatigued and thin  HEAD: Normocephalic, atraumatic; temporal wasting  EYES: PERRLA; jaundiced, skin appears yellow   ENT: Oropharynx pink without lesions, thick white coating to tongue  NECK: Neck supple, firm fixed left supraclavicular node roughly 4cm x 4cm, non-tender without erythema or edema, not eroding through skin  RESPIRATORY: CAMMY, LLL and RUL clear to auscultation, RML and RLL with diminished breath sounds, respirations unlabored   CV: Tachycardic, S1S2, no clicks, murmurs, rubs, or gallops; bilateral lower extremities without edema, dorsalis pedis pulses 2+ bilaterally  GASTROINTESTINAL: Normoactive bowel sounds x4 quadrants, abdomen soft, slightly distended, and non-tender to palpation without masses or organomegaly, no rebound tenderness, guarding, or " rigidity  GENITOURINARY: Not indicated  MUSCULOSKELETAL: Moves all extremities; BUE 5/5 strength, RLE plantarflexion 4/5, RLE dorsiflexion 3/5, BLE plantarflexion and dorsiflexion 5/5  NEUROLOGIC: CN II-XII intact, gait not assessed  SKIN: Appropriate color for race, warm and dry, no rashes or lesions to unclothed skin  PSYCHIATRIC: Pleasant and interactive, affect bright, makes appropriate eye contact    Labs:     None drawn today.    Assessment/Plan:    Clearly jaundiced today. Has biliary stent in place that needs to be replaced.  Thinks that fluid returning in the lung.  RLE doppler  CMP, CBC  Hypoalbuminemia-poor nutrition  T bili up to 12. 7 - consult GI regarding changing stent.  Alk Phos 1,184    Admit to hospital now.    >40 minutes of face to face time were spent with the patient with over 50% of that time spent in counseling, coordination of care, education, and symptom management.    Nga Yeung MD    Department of Ob/Gyn and Women's Health  Division of Gynecologic Oncology  [facility]  [phone]

## 2019-10-28 NOTE — PROGRESS NOTES
Gynecologic Oncology Follow-Up Note    RE: Odalys Nathan  MRN: [id number]  : 1960  Date of Visit: 10/28/2019    CC: Odalys Nathan is a 59 year old year old female with recurrent ovarian cancer who presents today for follow up regarding disease management.    HPI: Odalys comes to the clinic accompanied by her  Marcos. Odalys comes to the clinic feeling fair- she recently underwent surgical resection of a brain metastasis followed by gamma knife radiation. She was also recently hospitalized for Klebsiella bacteremia of unknown source, though she does have a biliary stent. She also has a recently placed ureteral stent. On Augmentin due to concern that biliary stent may be the source of her bacteremia.  Pain is better in LUQ. On MS Contin 15 mg bid, medical cannabis in between, taking laxatives on a regular basis, if not going takes Miralax. Started noticing jaundice yesterday. No nausea or vomiting. Fatigued. Urine looks coffee colored per patient. cutures were negative 10/24/19. Was to go in 11/15 for replacement of biliary stent  Losing weight, food does not taste good, tolerating Boost twice daily, tries chicken broth.      Oncology History:  2012 - Admitted to hospital for 2 weeks of intermittent abdominal cramping, distention, diarrhea and N/V. CT of abdomen/pelvis significant for small bowel obstruction, a heterogenous soft tissue density in the pelvis, omental nodules, and ascites. Bilateral adnexal masses per U/S of pelvis. CA-125 was elevated at 987, CEA was normal at 1.0.    12 - Therapeutic paracentesis (4 L) with cytology confirming malignancy (WI positive, weak ER positive, CK-7 positive consistent with GYN primary). Surgery recommended d/t potential for falling blood counts 2/2 chemotherapy (patient is Taoism and limiting blood transfusion)    12 - Exploratory laparotomy, MAR BSO, lysis of adhesion, Appendectomy, Repair cystotomy, Omentectomy  Hmxl-gborhl-zxpzkvqzc-transverse-colon resection, Ileo-descending colon anastomosis, CUSA, & Colonoscopy (done by Dr. Amato and colorectal team).  2/20/2012 - Admitted to hospital for bilateral pulmonary emboli and drainage of pleural effusion. Started on Lovenox.  2/28/12-3/21/12: Cycle #1-2 Carbo/Taxol IV  3/29/12 - Started on Keflex by her PCP for infection in her healing wound (immediately below her umbilicus).    4/11/12-6/14/12: Cycle #3-6 IV chemo, Cycle #1 IV/IP chemo  7/16/12 -  8, CT PRADEEP - enrolled in  - observation arm  3/4/13-6/28/13:  6, 9, 12, 15, 20.  7/1/13: CT Chest, Abdomen, Pelvis IMPRESSION:  1. Worsening metastatic ovarian carcinoma suggested by increased size of soft tissue nodules anterior to the right psoas muscle, that may represent growing mesenteric lymphadenopathy.  2. Remaining prominent right lower quadrant mesenteric lymph nodes are not significantly changed from CT 7/16/2012.  3. Clustered nodular opacities in the right lower lobe are not significant change from 7/16/2012 and remain indeterminate. Again, the appearance and distribution is suggestive of an infectious etiology.  4. Stable 8 mm soft tissue nodule in left breast, unchanged since at least 02/20/2012. This can be observed on followup studies, but correlation with mammography could be considered.  Decision made to start Doxil/Carbo.  7/11/13: The left ventricular ejection fraction is normal at 66.4%.  7/12/13-9/6/13: Cycle #1-3 Doxil/Carbo.  10, 11.    9/30/13 CT C/A/P Impression:  1. Overall, favorable response to treatment with decreasing size of soft tissue nodules tracking along the anterior aspect of the right psoas muscle.  2. 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  3. Improved cluster of right lower lobe pulmonary nodular opacities. These may represent resolving infection.  10/3/13-12/9/13:  9, 8, 10. Cycle 4-6 Doxil/Carbo.    1/13/14 CT C/A/P Impression:   1. Stable appearance  of metastatic ovarian cancer. Scattered soft tissue nodules along the anterior aspect of the right psoas muscle are unchanged in size. Mild mesenteric lymphadenopathy is unchanged.  2. Clustered micronodules in the right lower lobe are unchanged from 9/30/2013, but improved from 7/1/2013. This history suggests a postinflammatory/postinfectious etiology.  3. 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  1/16/14 Discussed multiple options for her based on relatively stable-appearing disease on CT but slight increase in  (which has had small increase to 20 with last recurrence) including chemo break with recheck of  in 1 month, starting new chemo agent immediately, and exploratory surgery with possible resection of nodules. She is considered platinum-sensitive based on > 1 year remission after Taxol/Carbo, which we will take into consideration for future chemo planning. I am not inclined to surgery at this time given difficulty she already has with diarrhea secondary to past colon resection. I suspect we would need to resect further bowel due to mesenteric disease. Also explained inherent risks of any major surgery. Also mentioned maintenance chemo, but this has not been shown to increase overall survival and would likely decrease her quality of life without significant benefit. Family is going on vacation to [place] in 2 weeks and Odalys does not want to have chemo prior to that, so will plan to take 1 month break. She can have  at that time (discussed checking toady since last draw was in early December, but as it would likely not change treatment plan and she has h/o slow rising , will not check today).  2/17/14  16    2/20/14: Decision to take break from chemo for two months, followed by CT and CA-125.    4/21/14  27  4/21/14 CT C/A/P Impression:    1. Increased size of 2 low-attenuation lymph nodes anterior to the right psoas muscle is concerning for worsening metastatic  "ovarian cancer.    2. New circumferential thickening of a 3.8 cm length segment of distal transverse colon is likely physiologic. Recommend attention on followup imaging.    3. Grossly unchanged size of clustered small nodules versus scarring in the right lower lobe the lungs.    4. Stable thrombosis of the right ovarian vein.  5/14/14: Diagnostic laparoscopy converted to exploratory laparotomy and removal of mesenteric masses, tumor debulking, peritoneal biopsies and intraperitoneal port placement. On laparoscopy, it was noted that there were small nodules on the anterior abdominal wall near the previous incision, small nodules on the right pelvic sidewall as well. Nodules were palpated in the mesentery; however, as it was unable to clarify where the origin of the nodules was, the decision was made to open the patient. On opening there was found to be approximately a 3 cm nodule in the small bowel mesentery and another separate approximately 2 cm nodule in the bowel mesentery. Pelvis without evidence of cancer, some mesenteric lymph nodes were palpated. No evidence otherwise of any disseminated cancer throughout the abdomen.    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 (2/2)  -Largest metastasis: 1.3 cm  -See comment  D: Peritoneum, right paracolic gutter #2, biopsy:  -Fibroadipose tissue with granulomatous inflammation surrounding refractile material  -Negative for malignancy   E: Small bowel adhesion, biopsy:  -Fibroconnective tissue, consistent with adhesion  -Negative for malignancy  F: Lymph nodes, mesentry, not otherwise specified, excision:  -Two lymph nodes, " "negative for metastatic carcinoma (0/2)  G: Lymph node, mesentery, \"#2\", excision:  -One lymph node, negative for metastatic carcinoma (0/1)  H: Lymph nodes, mesentery, \"nodule #2\", excision:  -Five lymph nodes, negative for metastatic carcinoma (0/5)  COMMENT:  Some of the specimens show post-operative changes. Others show possible treatment related changes, including necrosis. The metastatic carcinoma in the mesenteric lymph nodes (specimen C) shows variable morphology, including relatively low grade tumor with papillary architecture, and high grade tumor comprised of nests of tumor cells with irregular, slit-like spaces and marked nuclear pleomorphism.    5/29/14: Cycle 1 IV PACLitaxel / IP CISplatin / IP PACLitaxel.  - 28.  6/26/14: Cycle #2 IV/IP.  10  8/5/14: CT chest/abd/pelvis IMPRESSION     1. In this patient with ovarian cancer, overall findings are indicative of stable/slight improvement, as multiple mesenteric lymphadenopathy and scattered nodular peritoneal soft tissue mass lesions appear unchanged or slightly smaller since 4/21/2014.    2. 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 Mild dilatation of the second and the third portion of the duodenum with a narrow SMA angle. This could represent SMA syndrome, if clinically correlated.17/14:    Cycle #3 IV/IP.  10.    CT chest/abd/pelvis with contrast on 8/5/14    Impression:    1. In this patient with ovarian cancer, overall findings are indicative of stable/slight improvement, as multiple mesenteric lymphadenopathy and scattered nodular peritoneal soft tissue mass lesions appear unchanged or slightly smaller since 4/21/2014.    2. 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 Mild dilatation of the second and the third portion of the duodenum with a narrow SMA angle. This could represent SMA syndrome, if clinically correlated.  8/7/14: Cycle #4 Taxol/Carbo (changed from IV/IP).  10. She has been " feeling okay. She is unsure if she can finish out the course of 6 cycles IV/IP taxol/cisplatin. She feels like she has the flu for about a week then starts feeling gradually better after each chemo cycle. Her spouse notes that she actually has been more sick with the treatments than she initially admits here. She was also previously having some rib pain. Denies any rib pain now. Denies any chest pain or shortness of breath.  Plan: discussed recent CT cap results and switching to just IV as she is feeling miserable with IP treatments. Switch to IV carbo/taxol as patient is platinum sensitive.  We also discussed her taking part of the tesaro trial, which would require BRCA testing. She would like to take part in this trial if eligible.  8/20/14: Remove Intraperitoneal Port ( Port and catheter intact - discarded)  8/28/14: Cycle #5 Taxol/Carbo held due to thrombocytopenia.  6.    She denies any vaginal bleeding, no changes in her bowel or bladder habits, no nausea/emesis, no lower extremity edema, and no difficulties eating or sleeping. She denies any abdominal discomfort/bloating, no fevers or chills, and no chest pain or shortness of breath. She states her diarrhea is the same. She reports some fatigue which improves about 1-2 weeks after her chemotherapy. She states she does not need any medication refills and she was told she does not meet the criteria for the TESARO trial. She states she has 3 bags of iv fluids left over from her previous chemotherapy and will give these to herself. She states she is ready for her treatment today.    9/29/14: Cycle #6 Taxol/Carbo  6. Insurance questions regarding GSF coverage today. No concerns other than fatigue. Taking iron for anemia and does not desire blood transfusion. Using neulasta for neutropenia. Using home IV hydration if needed. Baseline unchanged. No abdominal bloating, constipation, diarrhea, pain, vaginal or rectal bleeding, cough or dyspnea, fluid  retention.    10/16/14: Impression:    1. Nodular peritoneal soft tissue mass in the right lower quadrant adjacent to the psoas muscle is no longer appreciated. Adjacent prominent lymphadenopathy is unchanged from previous exam. No new peritoneal lesions.    2. 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     10/20/14:  5. CT chest/abdomen/pelvis on 10/16/14 showed nodular peritoneal soft tissue mass in the right lower quadrant adjacent to the psoas muscle is no longer appreciated. Adjacent prominent lymphadenopathy is unchanged from previous exam. 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  1/27/15:  6.  4/28/15:  14.  5/26/15:  18.  6/2/15: CT cap Impression:  1. Postsurgical changes of hysterectomy and bilateral salpingo-oophorectomy for ovarian cancer. There is a new 8 mm hazy, ill-defined hypoattenuating lesion in hepatic segment 6 which is suspicious for a metastatic deposit. Further evaluation with ultrasound in recommended.    2. Increased size of a left retroperitoneal lymph node which is indeterminate but may represent a ciro metastasis. Mildly prominent lymph nodes in the right lower quadrant are not significantly changed.  3. Moderate colonic stool burden.    6/4/15: US abdomen IMPRESSION:    Hyperechoic lesion in the right hepatic lobe, consistent with hemangioma. This does not corresponds to the area of the lesion seen on CT from 6/2/2015. An MR would be helpful for identifying and characterizing the lesion from the recent CT.  6/12/15: MR abd IMPRESSION:  1. New 20 x 11 mm enhancing lesions between the right obliques, concerning for metastatic disease. This lesions should be amenable to percutaneous biopsy, if indicated.  2. Correlating to the lesion visualized on comparison CT is a hepatic segment 6 subcapsular 7 mm lesion. Overall the appearance favors the diagnosis of a simple cyst. However, there is faint suggestion of  mild peripheral arterial enhancement. Although this is favored as  artifactual, this should be followed up to confirm stability. Recommend 6 month followup.  3. Hepatic segment 6, 5 mm lesion too small to technically characterize. Differential would favor FNH, less likely flash filling hemangioma. Recommend attention on followup.  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      9/1/15: Cycle #1 Avastin/Cytoxan.   31.      9/24/15:feeling generally well. She says she has been having back and stomach spasms. She is taking cytosine daily (she ran out yesterday). She also says its affecting her voice. Admits that it burns occasionally. She is eating and drinking normal. She also admit diarrhea, 5-7 times daily, lose/watery. She trying to stay hydrated and eat fiber. She also says that her body is sore, especially the bottom of her feet. Her blood pressure is normal. She also admits having headache after her first infusion.       9/24/15: Cycle #2 Avastin/Cytoxan.  19.  10/15/15: Cycle #3 Avastin/Cytoxan.  16.      11/6/15: CT c/a/p IMPRESSION:    1. Stable postoperative change of MAR/BSO for ovarian cancer.  2. The lesion in the right flank abdominal musculature is slightly decreased in size. Otherwise, stable examination.  2. No evidence of metastatic disease in the chest.     11/20/15: Treatment planning visit,  16     11/25/15: surgical pathology report  FINAL DIAGNOSIS:  Soft tissue, right oblique muscle mass, excision:  -Recurrent ovarian serous carcinoma  -Carcinoma is present less than 1 mm from one resection margin  -Background skeletal muscle and fibroadipose tissue     1/4/16:  22  1/11/16-1/27/16: Radiation to right flank x 12 treatments  4/7/2016:  94. CT cap IMPRESSION:    In this patient with ovarian cancer status post MAR/BSO and  descending/transverse colectomy:  1. No evidence for malignancy in the chest, abdomen, or pelvis.  2. Stable small hypodense segment 6 liver lesion, appears more likely benign, possibly a cyst.  5/13/16:  124.  6/3/16: PET CT IMPRESSION: In this patient with a history of ovarian cancer:  1. Hypermetabolic and enlarging periaortic and perihepatic lymphadenopathy compatible with metastatic disease, as detailed above.  2. Although hypodense lesion in hepatic segment 6 has been present since 6/2/2015 associated hypermetabolism makes this lesion highly concerning for metastatic disease.     Plan: to start Niraparib under TESARO study.      6/9/16:  137.  6/14/16: Cycle #1 Niraparib.    6/28/16: Cycle #1 D15 Niraparib.    7/5/16:  100.    7/11/16: Cycle #2 Nraparib.   83.     8/3/2016: PET CT IMPRESSION:    In this patient with known history of ovarian cancer:  1) New pleural based nodular opacities in the lateral and inferior aspects of the bilateral lower lobes, worse in the left lung. Likely infection. Close follow up is recommended.      2) Slight decrease in hypermetabolic abdominal lymphadenopathy. 2 hypermetabolic lymph nodes persist.  3) Unchanged right hepatic lobe metastatic lesion.      8/9/16: Cycle #3 Niraparib.  69.  9/6/16: Cycle #4 Niraparib.  53. Dose held due to anemia.  9/13/16: Eval for potential cycle 4 niraparib. Dose held due to anemia.  10/4/16: CT CAP impression:  IMPRESSION: In this patient with a known history of ovarian cancer:  1. There has been interval resolution of pleural-based nodular  opacities which likely represented infection.  2. Abdominal lymphadenopathy in the form of 2 portacaval lymph nodes  have not significantly changed in size, noted to be hypermetabolic on  prior PET/CT.  3. Previously demonstrated metastatic lesion in the right lobe of the  liver is not significantly changed.  10/11/16:  60  11/1/16: C6 niraparib,   75  11/29/16: C7 niraparib.  78. CT CAP impression as follows:  Target lesions (RECIST criteria):       A previously described target lesion superior to the head of the  pancreas (series 2, image 64)  (referred to as a perihepatic node on  6/3/2016) may not be a valid target lesion because it measured less  than 1.5 cm originally. However, this particular node has decreased in  size, now measuring 7 mm in short axis versus 14 mm on 6/3/2016 when  measured in a similar fashion.       2.3 cm short axis portacaval lymph node on series 2 image 67,  previously 2.0 cm on 10/4/2016.       1.2 cm subtle hypodensity in hepatic segment 6 on series 2 image 75,  stable on multiple studies since at least 6/3/2016     Sum of diameters today: 3.5 cm. Sum of diameters 10/4/2016: 3.2 cm.  Growth = 9%.    12/27/16: C8 niraparib.  105.  1/25/17: C9 niraparib.  108.  2/23/17: C10 niraparib.  132.   CT CAP impression:  Sum of target lesion diameters today: 3.7 cm. Sum of target lesion  diameters on 11/28/2016: 3.5 cm. Growth= 6%  1. In this patient with history of ovarian cancer there is stable  disease by RECIST criteria as evidenced by:   1a. Mildly increased size of liver metastasis.  1b. Stable portacaval lymphadenopathy.  1c. No evidence of metastatic disease in the chest.  2. Trace emphysematous changes of the lungs.  3/22/17: C11 niraparib.  132.  4/19/17: C12 niraparib.  127.  5/16/17: CT CAP IMPRESSION: In this patient with ovarian cancer:  1. Mildly increased size of hepatic metastasis segment 6 with subtle increased capsular retraction.  2. Stable edmundo hepatis nodes, with mild increase in size of periaortic lymph nodes.  3. Prominent left supraclavicular lymph node with subtle increase in size compared to prior studies, particularly comparing to 10/4/2016.  4. Mild subtle groundglass opacities in the right upper lobe, not present on prior study, lesser extent in the right lower  lobe.  Findings may represent infection, additional consideration is malignancy (less likely), and attention on follow-up study  Recommended.  Addendum:   Prominent left supraclavicular lymph node (3/25) is stable from most recent CT performed 2/20/2017, currently measuring 14 x 16 mm, previously 14 x 16 mm on 2/20/2017.      The portal caval lymph node/edmundo hepatis lymph node is stable from 2/20/2017, measuring 21 mm.      Clarification of size of the para-aortic lymph node (series 3 image 327). It short axis measurement is 11 mm versus 9 mm on prior study.      Hepatic segment 6 triangular-shaped low density lesion (3/362) measures 14 mm, previously measured 12 mm, demonstrating a  possible/questionable minimal subtle increase in size. Similarly the lymph nodes noted above in the supraclavicular and para-aortic regions demonstrate possible minimal possible subtle increase in size.      5/18/17: Cycle #13 Niraparib.  191.  6/15/17: Cycle #14 Niraparib.  146.  7/13/17: Cycle #15 Niraparib 200 mg.  171     CT (8/9/17):       IMPRESSION:  1. Segment 6 hepatic metastasis is stable to minimally increased in size.  2. Slight increase in multicentric adenopathy, most pronounced at the edmundo hepatis. Additional sites include the inferior left neck/supraclavicular region and retroperitoneum which appear stable to  minimally increased.      8/10/17:  175  9/14/17: MUGA LVEF 54%  9/22/17: C1D1 carboplatin/Doxil.  187.  10/19/17: C2D1 carboplatin/Doxil.  108.  11/17/17: C3D1 carboplatin/Doxil.  82.  12/14/17: C4D1 carboplatin/Doxil/avastin.  83.  Of note, avastin held on C4D14  1/12/18: C5D1 carboplatin/Doxil/avastin.  80.  1/26/18: platelets 61, patient was not given avastin due to being jehova's witness.   2/9/18: C6D1 carboplatin/Doxil/Avastin.   71.  3/2/18:  49. Three month treatment break.  6/20/2018:  170. CT CAP:  IMPRESSION: In this patient with  history of ovarian cancer:  1. Increased size of a right hepatic lobe lesion and numerous edmundo hepatis and retroperitoneal lymph nodes compatible with progression of metastatic disease.  2. New mild intrahepatic biliary ductal dilatation, periportal edema, and pericholecystic fluid. Increased soft tissue fullness in the edmundo hepatis in the expected location of the common hepatic duct. Findings  are suspicious for developing biliary ductal obstruction secondary to worsening metastatic disease in the edmundo hepatis. Correlation with liver function tests is recommended. Right upper quadrant ultrasound  may be beneficial.  3. Unchanged left supraclavicular and right hilar lymphadenopathy in the chest.      8/3/18: C1D1 weekly paclitaxel.  not done.  9/20/18: C2D1 weekly paclitaxel 80mg/m2. Ca 125-56  11/8/2018: C3D1 weekly paclitaxel;  26     12/17/18: Ct cap IMPRESSION:  1. New area of lobulated hypodense nodularity at the far-inferior right liver. This raises the possibility of a new area of hepatic metastasis.  2. Conversely, other nodules within the right liver are smaller suggesting improvement.  3. Improved adenopathy at the abdominal retroperitoneum. Improved left  supraclavicular adenopathy. Stable mildly prominent right hilar lymph nodes.  4. Previously noted ill-defined soft tissue at the edmundo hepatis region is still present but appears less prominent in size.  5. New finding of segmental wall thickening of the proximal sigmoid colon with some fluid distention of the adjacent colon. This could represent a segmental colitis. Other etiologies not excluded.     12/20/18:   19.  1/22/19:  45.  3/20/19: CT cap IMPRESSION:  1. Progression of disease with increasing size of a right inferior hepatic mass consistent with metastatic disease.  2. Increasing ill-defined multifocal regions of soft tissue at the edmundo hepatis consistent with malignant adenopathy versus malignant implants.  Associated increased intrahepatic biliary ductal dilatation.  3. Other areas of progressive adenopathy noted at the left neck base, mediastinum, and abdominal retroperitoneum.  4. New area of carcinomatosis medial to stomach at the left upper abdomen.     3/20/19: Cycle #1 weekly paclitaxel.   180.  5/7/19: Cycle #2 weekly paclitaxel.    142.  6/20/19: Cycle #3 weekly paclitaxel/  259.     7/11/19: CT CAP-IMPRESSION:  1. Slight increased size of the left supraclavicular lymph node.  Slight progression of the mediastinal lymph nodes is also evident. New  periesophageal node as described above. Retroperitoneal nodes are  stable if not slightly smaller. Some of these nodes appear to have  partially calcified suggesting a response to interval therapy.  2. Interval decreased size of the inferior right hepatic lobe lesion  now with an area of central calcification or enhancement. No new liver  lesions. Remaining abdominal organs are grossly unremarkable. No  significant hydronephrosis.  3. Postop changes involving the bowel and pelvic region. No recurrent  pelvic mass.     8/8/19: C1 gemcitabine 800mg/m2 IV days 1&8.  446.    8/23/19: Endoscopic Retrograde Cholangiopancreatogram with 4 mm Hurricane Balloon Dilation, gallbladder stent and Bile Duct stent placements, biliary Sphincterotomy with Dr. Lloyd. Was to move ERCP to 9/12 but readmitted to hospital       8/29/19: C2 gemcitabine 800mg/m2 IV days 1&8.  548.    10/17/2019-10/20/2019: - Electrolyte abnormalities (Hyponatremia, Hypokalemia, Hypomagnesemia) s/p repletion  - Bilateral pleural effusion, s/p right thoracentesis  - Recurrent ovarian cancer with mets to the liver and brain  - Retroperitoneal and mesenteric adenopathy  - Failure to thrive   - Severe malnutrition    Recurrent ovarian cancer in the setting of recent resection of brain mets and gamma knife radiation    10/24/2019-10/26/2019: hospital admission-[facility]  1.  Shortness of  breath due to recurrent malignant right pleural effusion; s/p thoracentesis.  1.45 liters removed.     2.  Hypokalemia, acute on chronic.  Replaced per potassium replacement protocol.     3.  Hypomagnesemia, acute on chronic.  Replaced per Magnesium replacement protocol.     4.  Hypophosphatemia, acute on chronic.  Replaced per Phosphorous replacement protocol.      5.  Mildly elevated troponin level.  Suspect demand ischemia from large right pleural effusion and respiratory difficulty. Echo showed normal EF and no WMA.      6.  Metastatic ovarian cancer (mets to liver and brain).       7.  Biliary stent in place with plans to convert to metal stent in near future.     8. Ureter stent in place with some hematuria.      9.  Recent klebsiella bacteremia of unclear source; on prolonged course of Augmentin.     10.  Chronic pain syndrome managed with chronic opiate therapy.     11.  Hypothyroidism.       12.  GERD.         Date Value Ref Range Status   10/17/2019 1,993 (H) 0 - 30 U/mL Final     Comment:     Assay Method:  Chemiluminescence using Siemens Centaur XP   10/03/2019 1,398 (H) 0 - 30 U/mL Final     Comment:     Assay Method:  Chemiluminescence using Siemens Centaur XP   08/29/2019 548 (H) 0 - 30 U/mL Final     Comment:     Assay Method:  Chemiluminescence using Siemens Centaur XP   08/08/2019 446 (H) 0 - 30 U/mL Final     Comment:     Assay Method:  Chemiluminescence using Siemens Centaur XP   07/24/2019 425 (H) 0 - 30 U/mL Final     Comment:     Assay Method:  Chemiluminescence using Siemens Centaur XP   07/15/2019 340 (H) 0 - 30 U/mL Final     Comment:     Assay Method:  Chemiluminescence using Siemens Centaur XP   06/20/2019 259 (H) 0 - 30 U/mL Final     Comment:     Assay Method:  Chemiluminescence using Siemens Centaur XP   05/07/2019 142 (H) 0 - 30 U/mL Final     Comment:     Assay Method:  Chemiluminescence using Siemens Centaur XP   03/20/2019 180 (H) 0 - 30 U/mL Final     Comment:     Assay  Method:  Chemiluminescence using Siemens Centaur XP   03/11/2019 147 (H) 0 - 30 U/mL Final     Comment:     Assay Method:  Chemiluminescence using Siemens Centaur XP         Past Medical History:   Diagnosis Date     Antiplatelet or antithrombotic long-term use      Ascites      Blood clot in the legs      Diabetes (H)      Ovarian cancer (H)     serous,stg IV     Pleural effusion      Pulmonary embolism (H) 2/2012     Refusal of blood transfusions as patient is [employer]      Short gut syndrome      Subclinical hypothyroidism 4/18/2013     Thrombosis of leg        Past Surgical History:   Procedure Laterality Date     COLECTOMY       COLONOSCOPY  2/1/2012    Procedure:COLONOSCOPY; With Biopsy; Surgeon:TONI SULLIVAN; Location:[facility] OR     CYSTOSCOPY, RETROGRADES, INSERT STENT URETER(S), COMBINED Right 10/4/2019    Procedure: CYSTOSCOPY, WITH RETROGRADE PYELOGRAM AND URETERAL STENT INSERTION;  Surgeon: Wolf Gan MD;  [facility]     ENDOSCOPIC RETROGRADE CHOLANGIOPANCREATOGRAM N/A 8/23/2019    Procedure: Endoscopic Retrograde Cholangiopancreatogram with 4 mm Hurricane Balloon Dilation, gallbladder stent and Bile Duct stent placements, Bilarry Sphincterotomy ;  Surgeon: Catrachito Lloyd MD;  Location: [facility] OR     HYSTERECTOMY TOTAL ABD, RHIANNA SALPINGO-OOPHORECTOMY, NODE DISSECTION, TUMOR DEBULKING, COMBINED  2/1/2012    Procedure:COMBINED HYSTERECTOMY TOTAL ABDOMINAL, BILATERAL SALPINGO-OOPHORECTOMY, NODE DISSECTION, TUMOR DEBULKING;  Exploratory Laparotomy, Total Abdominal Hysterectomy, Bilateral Salpingo-Oophorectomy, appendectomy,lysis of adhesions, ileal, ascending, transverse and splenic flexure resection, ileal descending bowel renanastomosis, incidental cystotomy repair, CUSA procedure and colonoscopy ; [facility]     INSERT PORT PERITONEAL ACCESS  4/3/2012    Procedure:INSERT PORT PERITONEAL ACCESS; Intraperitoneal Port Placement (c-arm); Surgeon:SAMUEL CARRASCO; Location:[facility]     INSERT PORT  PERITONEAL ACCESS  5/14/2014    Procedure: INSERT PORT PERITONEAL ACCESS;  Surgeon: Nga Yeung MD;  Location: [facility] OR     INSERT PORT VASCULAR ACCESS       IR PORT REMOVAL RIGHT  9/20/2019     LAPAROSCOPY DIAGNOSTIC (GYN)  5/14/2014    Procedure: LAPAROSCOPY DIAGNOSTIC (GYN);  Surgeon: Nga Yeung MD;  Location: [facility]     LAPAROTOMY EXPLORATORY Right 11/25/2015    Procedure: LAPAROTOMY EXPLORATORY;  Surgeon: Nga Yeung MD;  Location: [facility]     LAPAROTOMY, TUMOR DEBULKING, COMBINED  5/14/2014    Procedure: COMBINED LAPAROTOMY, TUMOR DEBULKING;  Surgeon: Nga Yeung MD;  Location: [facility]     OPTICAL TRACKING SYSTEM CRANIOTOMY, EXCISE TUMOR, COMBINED Right 9/18/2019    Procedure: Stealth Assisted Right Craniotomy And Tumor Resection;  Surgeon: Bertin Dewey MD;  Location: [facility]     PICC INSERTION Left 09/20/2019    5Fr - 46cm (4cm external), basilic vein, low SVC     REMOVE CATHETER PERITONEAL N/A 8/20/2014    Procedure: REMOVE CATHETER PERITONEAL;  Surgeon: Nga Yeung MD;  Location: [facility]     VASCULAR SURGERY      stent left iliac vein       Current Outpatient Medications   Medication     albuterol (PROAIR HFA/PROVENTIL HFA/VENTOLIN HFA) 108 (90 Base) MCG/ACT inhaler     amoxicillin-clavulanate (AUGMENTIN) 875-125 MG tablet     Ascorbic Acid (VITAMIN C PO)     cyanocobalamin (VITAMIN B12) 1000 MCG/ML injection     diphenoxylate-atropine (LOMOTIL) 2.5-0.025 MG per tablet     dronabinol (MARINOL) 2.5 MG capsule     Ferrous Sulfate 324 (65 Fe) MG TBEC     HEMP OIL OR EXTRACT OR OTHER CBD CANNABINOID, NOT MEDICAL CANNABIS,     HERBALS     ipratropium - albuterol 0.5 mg/2.5 mg/3 mL (DUONEB) 0.5-2.5 (3) MG/3ML neb solution     LEVOTHYROXINE SODIUM PO     loperamide (IMODIUM) 2 MG capsule     LORazepam (ATIVAN) 1 MG tablet     magnesium oxide (MAG-OX) 400 MG tablet     medical cannabis (Patient's own supply)     morphine (MS CONTIN) 15 MG CR tablet     naloxone (NARCAN)  4 MG/0.1ML nasal spray     oxyCODONE (ROXICODONE) 5 MG tablet     pantoprazole (PROTONIX) 40 MG EC tablet     polyethylene glycol (MIRALAX/GLYCOLAX) packet     potassium chloride (KLOR-CON) 20 MEQ packet     prochlorperazine (COMPAZINE) 10 MG tablet     tamsulosin (FLOMAX) 0.4 MG capsule     Calcium Carbonate-Vitamin D (CALCIUM + D PO)     clotrimazole 10 MG shane     ondansetron (ZOFRAN-ODT) 4 MG ODT tab     order for DME     VITAMIN E NATURAL PO     No current facility-administered medications for this visit.        No Known Allergies    Family History   Problem Relation Age of Onset     Cancer Mother 69        lung, smoker     Cancer Maternal Uncle 65        brain     Colon Cancer Maternal Aunt 80        colon       Social History     Socioeconomic History     Marital status:      Spouse name: Not on file     Number of children: Not on file     Years of education: Not on file     Highest education level: Not on file   Occupational History     Not on file   Social Needs     Financial resource strain: Not on file     Food insecurity:     Worry: Not on file     Inability: Not on file     Transportation needs:     Medical: Not on file     Non-medical: Not on file   Tobacco Use     Smoking status: Former Smoker     Packs/day: 0.00     Years: 8.00     Pack years: 0.00     Types: Cigarettes     Last attempt to quit: 1980     Years since quittin.3     Smokeless tobacco: Never Used     Tobacco comment: started at 9 yo and quit at 16 yo   Substance and Sexual Activity     Alcohol use: Yes     Comment: 3x/day wine or jodi     Drug use: No     Sexual activity: Not on file   Lifestyle     Physical activity:     Days per week: Not on file     Minutes per session: Not on file     Stress: Not on file   Relationships     Social connections:     Talks on phone: Not on file     Gets together: Not on file     Attends Christian service: Not on file     Active member of club or organization: Not on file      Attends meetings of clubs or organizations: Not on file     Relationship status: Not on file     Intimate partner violence:     Fear of current or ex partner: Not on file     Emotionally abused: Not on file     Physically abused: Not on file     Forced sexual activity: Not on file   Other Topics Concern     Parent/sibling w/ CABG, MI or angioplasty before 65F 55M? Not Asked   Social History Narrative     Not on file           ROS  12 point ROS reviewed and as above.    Lab Results   Component Value Date    WBC 14.3 10/28/2019     Lab Results   Component Value Date    RBC 4.11 10/28/2019     Lab Results   Component Value Date    HGB 12.2 10/28/2019     Lab Results   Component Value Date    HCT 35.9 10/28/2019     Lab Results   Component Value Date    MCV 87 10/28/2019     Lab Results   Component Value Date    MCH 29.7 10/28/2019     Lab Results   Component Value Date    MCHC 34.0 10/28/2019     Lab Results   Component Value Date    RDW 17.1 10/28/2019     Lab Results   Component Value Date     10/28/2019     Last Comprehensive Metabolic Panel:  Sodium   Date Value Ref Range Status   10/28/2019 129 (L) 133 - 144 mmol/L Final     Potassium   Date Value Ref Range Status   10/28/2019 4.1 3.4 - 5.3 mmol/L Final     Chloride   Date Value Ref Range Status   10/28/2019 96 94 - 109 mmol/L Final     Carbon Dioxide   Date Value Ref Range Status   10/28/2019 25 20 - 32 mmol/L Final     Anion Gap   Date Value Ref Range Status   10/28/2019 8 3 - 14 mmol/L Final     Glucose   Date Value Ref Range Status   10/28/2019 113 (H) 70 - 99 mg/dL Final     Urea Nitrogen   Date Value Ref Range Status   10/28/2019 5 (L) 7 - 30 mg/dL Final     Creatinine   Date Value Ref Range Status   10/28/2019 0.56 0.52 - 1.04 mg/dL Final     GFR Estimate   Date Value Ref Range Status   10/28/2019 >90 >60 mL/min/[1.73_m2] Final     Comment:     Non  GFR Calc  Starting 12/18/2018, serum creatinine based estimated GFR (eGFR) will be  "  calculated using the Chronic Kidney Disease Epidemiology Collaboration   (CKD-EPI) equation.       Calcium   Date Value Ref Range Status   10/28/2019 9.1 8.5 - 10.1 mg/dL Final     Calcium 9.1   8.5 - 10.1 mg/dL Final 10/28/2019  5:57 PM 1740   Bilirubin Total 12.7  High   0.2 - 1.3 mg/dL Final 10/28/2019  5:57 PM 1740   Albumin 1.6  Low   3.4 - 5.0 g/dL Final 10/28/2019  5:57 PM 1740   Protein Total 6.6  Low   6.8 - 8.8 g/dL Final 10/28/2019  5:57 PM 1740   Alkaline Phosphatase 1,184  High   40 - 150 U/L Final 10/28/2019  5:57 PM 1740     High   0 - 50 U/L Final 10/28/2019  5:57 PM 1740     High   0 - 45 U/L Final 10/28/2019  5:57 PM 1740           Physical Exam:    /61   Pulse 123   Temp 98.1  F (36.7  C) (Oral)   Resp 13   Ht 1.651 m (5' 5\")   Wt 57.3 kg (126 lb 6.4 oz)   SpO2 100%   BMI 21.03 kg/m      CONSTITUTIONAL: Chronically ill but non-toxic appearing female, appears fatigued and thin  HEAD: Normocephalic, atraumatic; temporal wasting  EYES: PERRLA; jaundiced, skin appears yellow   ENT: Oropharynx pink without lesions, thick white coating to tongue  NECK: Neck supple, firm fixed left supraclavicular node roughly 4cm x 4cm, non-tender without erythema or edema, not eroding through skin  RESPIRATORY: CAMMY, LLL and RUL clear to auscultation, RML and RLL with diminished breath sounds, respirations unlabored   CV: Tachycardic, S1S2, no clicks, murmurs, rubs, or gallops; bilateral lower extremities without edema, dorsalis pedis pulses 2+ bilaterally  GASTROINTESTINAL: Normoactive bowel sounds x4 quadrants, abdomen soft, slightly distended, and non-tender to palpation without masses or organomegaly, no rebound tenderness, guarding, or rigidity  GENITOURINARY: Not indicated  MUSCULOSKELETAL: Moves all extremities; BUE 5/5 strength, RLE plantarflexion 4/5, RLE dorsiflexion 3/5, BLE plantarflexion and dorsiflexion 5/5  NEUROLOGIC: CN II-XII intact, gait not assessed  SKIN: Appropriate " color for race, warm and dry, no rashes or lesions to unclothed skin  PSYCHIATRIC: Pleasant and interactive, affect bright, makes appropriate eye contact    Labs:     None drawn today.    Assessment/Plan:    Clearly jaundiced today. Has biliary stent in place that needs to be replaced.  Thinks that fluid returning in the lung.  RLE doppler  CMP, CBC  Hypoalbuminemia-poor nutrition  T bili up to 12. 7 - consult GI regarding changing stent.  Alk Phos 1,184    Admit to hospital now.    >40 minutes of face to face time were spent with the patient with over 50% of that time spent in counseling, coordination of care, education, and symptom management.    Nga Yeung MD    Department of Ob/Gyn and Women's Health  Division of Gynecologic Oncology  [facility]  [phone]

## 2019-10-29 NOTE — PROGRESS NOTES
Gynecology Oncology Progress Note  10/29/19    HD# 2 Biliary obstruction    Disease: Recurrent ovarian cancer with liver and brain metastases      24 hour events:   - Admission  - IV fluid resuscitation with electrolyte replacement  - Started on Zosyn    Subjective: Patient is feeling okay.  Denies any pain, fever, chills. Abdomen still feels normal to her without any tenderness or distension. Had felt slightly short of breath yesterday which improved with NC O2. Voiding spontaneously.     Objective:   Vitals:    10/28/19 2318 10/28/19 2320 10/29/19 0153 10/29/19 0209   BP: (!) 79/36 (!) 89/53 (!) 84/44 94/52   BP Location: Right arm Left arm Left arm Right arm   Pulse: 107  97    Resp: 18  14    Temp: 97.6  F (36.4  C)  97.5  F (36.4  C)    TempSrc: Oral  Oral    SpO2: 93%  95%    Weight:       Height:           General: NAD, appears comfortable in bed  CV: RRR, no m/r/g  Resp: Decreased breath sounds in R lung base. No  Wheezes. On 2 L O2  Abdomen: soft, nontender, nondistended  Extremities: warm, well-perfused, nontender, R>L edema, SCDs in place      24 hour: IN - 100 ml  PO // OUT - 75 ml UOP  Since MN: IN - NR ml IVF // OUT - NR ml UOP      New labs/imaging-  CBC, CMP    Assessment: 59 year old female with recurrent ovarian cancer found to have jaundice elevated total bilirubin admitted for concern for biliary obstruction      FEN: NPO  Pain: Continue PTA MS Contin 15 mg BID, oxycodone prn     # Recurrent ovarian cancer with metastases to liver and brain  - s/p Craniotomy, tumor resection, Gamma knife  - Plan for outpatient follow up for further management     # Biliary obstruction: Biliary stent placed 8/23/2019 for chronic cholecystitis and biliary stricture. She had been on IV ceftriaxone for Klebsiella bacteremia of unknown source until 9/25/19 then started Augmentin which she has continued since that time. She was scheduled for replacement of stent in 11/15/19. No signs of infection at this time.  Patient does have leukocytosis but this is stable since last CBC two weeks ago.    - GI consulted; Recommended Zosyn, NPO, and plan for ERCP, biliary stent exchange in AM. No imaging needed at this time.   - Start zosyn     # Recurrent pleural effusions  - Last thoracentesis 10/25. Oxygensaturation appropriate on RA. Continue to monitor pulmonary status. Can obtain CXR for evaluation of thoracentesis.     # Severe malnutrition, failure to thrive  # Hyponatremia  # Hypomagnesemia  # Hypophosphatemia  - Continue PTA marinol  - Nutrition consult  - Electrolyte replacement protocol     # RLE edema  - Doppler US to rule out DVT     # Hydronephrosis R kidney  - R ureteral stent in place  - Continue PTA flomax      Yesenia Salgado MD  Obstetrics & Gynecology, PGY-2  10/29/2019 5:23 AM    I, Festus Arroyo MD personally examined and evaluated this patient on 10/29/19.  I discussed the patient with the resident and care team, and agree with the assessment and plan of care as documented in the residents note above.    I personally reviewed vital signs, laboratory values and imaging results.    Pt with end stage ovarian cancer admitted with hyperbilirubinemia due to stent blockage.  Plan stent replacement with GI today.  On zosyn per GI for procedure today.    Giuliana Arroyo MD  Gynecologic Oncology  [facility]

## 2019-10-29 NOTE — PLAN OF CARE
VSS on 2L, hypotensive at baseline. Denies pain at rest. NPO at 0015 for possible biliary stent exchange. Denies nausea. Up with assist of 1. R leg with +2 edema, warm to touch, pulses palpable, L leg WDL. Mag replaced and phos is infusing. Resting between cares. Continue with POC.

## 2019-10-29 NOTE — OP NOTE
ERCP 10/29/2019 12:44 PM [facility], [address] [place] [address] [phone]     Endoscopy Department   _______________________________________________________________________________   Patient Name: Odalys Nathan           Procedure Date: 10/29/2019 12:44 PM   MRN: [id number]                       Account Number: [id number]   YOB: 1960               Admit Type: Inpatient   Age: 59                               Room: OR   Gender: Female                        Note Status: Finalized   Attending MD: Stephen Londono MD   Total Sedation Time:   _______________________________________________________________________________       Procedure:           ERCP   Indications:         Suspected ascending cholangitis, Jaundice, Stent change   Providers:           Stephen Londono MD, Jennifer Vanderheyden   Patient Profile:     Ms Nathan is a 60yo woman with metastatic ovarian cancer                        involving the liver and complicated by malignant biliary                        strictures and cholecystitis. She underwent common duct                        stent placement and gallbladder stent placement with                        excellent effect though now returns with progressive                        jaundice. She proceeds to management by interval ERCP 2w                        ahead of schedule.   Referring MD:        Aubrie Hester MD   Requesting Provider: Catrachito Lloyd MD   Medicines:           General Anesthesia, Indomethacin 100 mg ID, Invanz 1 g IV   Complications:       No immediate complications.   _______________________________________________________________________________   Procedure:           Pre-Anesthesia Assessment:                        - Prior to the procedure, a History and Physical was                        performed, and patient medications and allergies were                        reviewed. The patient is competent. The risks  and                        benefits of the procedure and the sedation options and                        risks were discussed with the patient. All questions                        were answered and informed consent was obtained. Patient                        identification and proposed procedure were verified by                        the nurse in the pre-procedure area. Mental Status                        Examination: alert and oriented. Airway Examination:                        normal oropharyngeal airway and neck mobility.                        Respiratory Examination: poor air movement. CV                        Examination: normal. ASA Grade Assessment: III - A                        patient with severe systemic disease. After reviewing                        the risks and benefits, the patient was deemed in                        satisfactory condition to undergo the procedure. The                        anesthesia plan was to use general anesthesia.                        Immediately prior to administration of medications, the                        patient was re-assessed for adequacy to receive                        sedatives. The heart rate, respiratory rate, oxygen                        saturations, blood pressure, adequacy of pulmonary                        ventilation, and response to care were monitored                        throughout the procedure. The physical status of the                        patient was re-assessed after the procedure. After                        obtaining informed consent, the scope was passed under                        direct vision. Throughout the procedure, the patient's                        blood pressure, pulse, and oxygen saturations were                        monitored continuously. The duodenoscope was introduced                        through the mouth, and used to inject contrast into and                        used to inject contrast into the bile  "duct. The ERCP was                        accomplished without difficulty. The patient tolerated                        the procedure well.                                                                                    Findings:        A  film of the right upper quadrant demonstrated the gallbladder        stent as well as the biliary stent and surgical clips. Limited white        light views of the forehgut were unremarkable save for the two stents        internalized across a patent biliary sphincterotomy. The common duct        stent was removed using a snare while the gallbladder stent was left in        situ throughout. The bile duct was deeply cannulated with the        short-nosed traction sphincterotome and later a 12 mm balloon in concert        with multiple 0.025\" Visiglide wires. Contrast was injected and I        personally interpreted the bile duct images. Ductal flow of contrast was        adequate, image quality was excellent and contrast extended throughout        the intahepatics. There were synchronous malignant appearing stenoses of        the common involving, one in the region just above the cystic insertion        and another just below and or perhaps involving the bifurcation. The        primary insertions also appeared to have stenoses. The intrahepatics        were diffusely dilated upstream, left upstream more than right. The        right posterior appeared to come off the left as well. The biliary tree        was swept with a 12 mm balloon starting at the bifurcation. Debris and        pus were swept from the duct. An 8.5 Fr by 22 cm transpapillary stent        with no external flaps and no internal flaps was placed 21 cm into a        left hepatic duct. An 8.5 Fr by 22 cm transpapillary stent with no        external flaps and no internal flaps was placed 21 cm into a right        hepatic duct. An 7 Fr by 12 cm transpapillary stent with a single        external pig tail and an " internal flap was placed 12 cm into a left        primary at the bifurcation. Bile and pus flowed through the stents and        the stents were in good position. The ventral pancreatic duct and        orifice were selectively not interrogated.                                                                                     Impression:          - Well positioned transpapillary gallbladder stent left                        in situ and not manipulated                        - Well positioned left primary stent removed from a                        widely patent biliary sphincterotomy                        - Synchronous malignant stenoses of the bifurcation and                        common duct with impressive intrahepatic (left more so                        than right) dilation                        - Evidence of cholangitis in the form of pus                        - Successful stent placement deep to the right and left                        intrahepatics with excellent drainage achieved   Recommendation:      - General anesthesia recovery with return to the floor                        when appropriate                        - Dr Lloyd's team to contact patient and determine if                        her next appointment should be kept (in 2 weeks for                        possible exchange to metal stents) or if she should                        return in 8 weeks (Ifor exchange with plastics)                        - Follow liver studies expectantly                        - A 24h dose of ertapenem was provided, at least another                        dose should be given in 24h and followed by an oral                        course of ciprofloxacin for another 8 days                        - The findings and recommendations were discussed with                        the patient and their family                                                                                       electronically signed by S.  Amateau

## 2019-10-29 NOTE — PROGRESS NOTES
Gynecology Post-Op Check     S: Pt feels SOB s/p procedure. Otherwise, no abdominal pain, SOB, headache, light headed or dizzyness. Denies nausea and hunger.     O:  Vitals:    10/29/19 1530 10/29/19 1545 10/29/19 1600 10/29/19 1644   BP: 100/46 95/50 100/56 96/54   BP Location:    Right arm   Pulse: 110  112    Resp: 18 20 24 22   Temp:  99.5  F (37.5  C)  97.5  F (36.4  C)   TempSrc:  Temporal  Oral   SpO2: 98% 95% 95% 93%   Weight:       Height:            Gen: sitting up in bed with NC on, mildly increased work of breathing, no acute distress  CV: RRR  Lungs: Crackles posterior lower lobes R>L  Abd: soft, non tender.   Ext: warm and well perfused, SCDs in place, no edema.    Hemoglobin   Date Value Ref Range Status   10/29/2019 9.7 (L) 11.7 - 15.7 g/dL Final   10/28/2019 12.2 11.7 - 15.7 g/dL Final       A/P:Odalys Nathan is a 59 year old female POD #0 s/p ERCP, biliary stent exchange. Recovering appropriately post op.     #S/p ERCP, common bile duct stent exchange  - Procedure showed malignant appearing stenosis of the common bile duct involving the bifurcation and common duct with impressive intrahepatic dilation with evidence of cholangitis (pus return following stent placement x3)  - Primary GI team to determine exchange in 2 weeks for metal stents or 8 weeks for repeat plastic stents  - Follow LFTs  - S/p 1d ertapenem, plan for 2nd dose ertapenem in 24 hours followed by PO cipro for 8 days  - Discussed plan/findings with patient and her      #SOB  #Bilateral pleural effusions  - Weaned from 6LPM to 3LPM, 93-94%   - Plan for thoracentesis, CAP team aware, plan for tomorrow morning    Dispo: Anticipate discharge to home when meeting post operative goals.     Latoya Pena MD PGY1  Obstetrics & Gynecology  10/29/19

## 2019-10-29 NOTE — ANESTHESIA CARE TRANSFER NOTE
Patient: Odalys Nathan    Procedure(s):  ENDOSCOPIC RETROGRADE CHOLANGIOPANCREATOGRAPH WITH BILARY STENT EXCHANGE, DEBRIDE AND STONE REMOVAL     Diagnosis: Biliary obstruction [K83.1]  Diagnosis Additional Information: No value filed.    Anesthesia Type:   General     Note:  Airway :Face Mask    Comments: VSS on arrival, report to RN.       Vitals: (Last set prior to Anesthesia Care Transfer)    CRNA VITALS  10/29/2019 1444 - 10/29/2019 1520      10/29/2019             Resp Rate (observed):  (!) 2                Electronically Signed By: HAILE Calvin CRNA  October 29, 2019  3:20 PM

## 2019-10-29 NOTE — ANESTHESIA PREPROCEDURE EVALUATION
Anesthesia Pre-Procedure Evaluation    Patient: Odalys Nathan   MRN:     [id number] Gender:   female   Age:    59 year old :      1960        Preoperative Diagnosis: Biliary obstruction [K83.1]   Procedure(s):  ENDOSCOPIC RETROGRADE CHOLANGIOPANCREATOGRAPHY     Past Medical History:   Diagnosis Date     Antiplatelet or antithrombotic long-term use      Ascites      Blood clot in the legs      Diabetes (H)      Ovarian cancer (H)     serous,stg IV     Pleural effusion      Pulmonary embolism (H) 2012     Refusal of blood transfusions as patient is Congregation      Short gut syndrome      Subclinical hypothyroidism 2013     Thrombosis of leg       Past Surgical History:   Procedure Laterality Date     COLECTOMY       COLONOSCOPY  2012    Procedure:COLONOSCOPY; With Biopsy; Surgeon:TONI SULLIVAN; Location:[facility] OR     CYSTOSCOPY, RETROGRADES, INSERT STENT URETER(S), COMBINED Right 10/4/2019    Procedure: CYSTOSCOPY, WITH RETROGRADE PYELOGRAM AND URETERAL STENT INSERTION;  Surgeon: Wolf Gan MD;  Location: [facility] OR     ENDOSCOPIC RETROGRADE CHOLANGIOPANCREATOGRAM N/A 2019    Procedure: Endoscopic Retrograde Cholangiopancreatogram with 4 mm Hurricane Balloon Dilation, gallbladder stent and Bile Duct stent placements, Bilarry Sphincterotomy ;  Surgeon: Catrachito Lloyd MD;  Location: [facility] OR     HYSTERECTOMY TOTAL ABD, RHIANNA SALPINGO-OOPHORECTOMY, NODE DISSECTION, TUMOR DEBULKING, COMBINED  2012    Procedure:COMBINED HYSTERECTOMY TOTAL ABDOMINAL, BILATERAL SALPINGO-OOPHORECTOMY, NODE DISSECTION, TUMOR DEBULKING;  Exploratory Laparotomy, Total Abdominal Hysterectomy, Bilateral Salpingo-Oophorectomy, appendectomy,lysis of adhesions, ileal, ascending, transverse and splenic flexure resection, ileal descending bowel renanastomosis, incidental cystotomy repair, CUSA procedure and colonoscopy ; [facility]     INSERT PORT PERITONEAL ACCESS  4/3/2012    Procedure:INSERT PORT PERITONEAL ACCESS;  Intraperitoneal Port Placement (c-arm); Surgeon:SAMUEL CARRASCO; Location:[facility] OR     INSERT PORT PERITONEAL ACCESS  5/14/2014    Procedure: INSERT PORT PERITONEAL ACCESS;  Surgeon: Nga Yeung MD;  Location: [facility] OR     INSERT PORT VASCULAR ACCESS       IR PORT REMOVAL RIGHT  9/20/2019     LAPAROSCOPY DIAGNOSTIC (GYN)  5/14/2014    Procedure: LAPAROSCOPY DIAGNOSTIC (GYN);  Surgeon: Nga Yeung MD;  Location: [facility] OR     LAPAROTOMY EXPLORATORY Right 11/25/2015    Procedure: LAPAROTOMY EXPLORATORY;  Surgeon: Nga Yeung MD;  Location: [facility]     LAPAROTOMY, TUMOR DEBULKING, COMBINED  5/14/2014    Procedure: COMBINED LAPAROTOMY, TUMOR DEBULKING;  Surgeon: Nga Yeung MD;  Location: [facility] OR     OPTICAL TRACKING SYSTEM CRANIOTOMY, EXCISE TUMOR, COMBINED Right 9/18/2019    Procedure: Stealth Assisted Right Craniotomy And Tumor Resection;  Surgeon: Bertin Dewey MD;  Location: [facility] OR     PICC INSERTION Left 09/20/2019    5Fr - 46cm (4cm external), basilic vein, low SVC     REMOVE CATHETER PERITONEAL N/A 8/20/2014    Procedure: REMOVE CATHETER PERITONEAL;  Surgeon: Nga Yeung MD;  Location: [facility] OR     VASCULAR SURGERY      stent left iliac vein          Anesthesia Evaluation     . Pt has had prior anesthetic. Type: General and MAC    No history of anesthetic complications          ROS/MED HX    ENT/Pulmonary:     (+)tobacco use, Past use , . Other pulmonary disease B/L pleural effusion (right modeate, Left mild). On 2L O2 at baseline with saturation in high 90s with head elevation. .    Neurologic:  - neg neurologic ROS     Cardiovascular: Comment: H/o blood clots (PE and DVT) in the past. Not on anticoagulation at present.     (+) ----. : . . . :. . Previous cardiac testing Echodate:10/24/19results:Interpretation Summary     1. The left ventricle is normal in structure, function and size. The visual  ejection fraction is estimated at 60%. Normal left ventricular wall  motion  2. The right ventricle is normal in structure, function and size.  3. No valve disease.date: results: date: results: date: results:         (-) taking anticoagulants/antiplatelets   METS/Exercise Tolerance:     Hematologic: Comments: Jehova's Witness    (+) History of blood clots (2012) pt is not anticoagulated, Anemia, Other Hematologic Disorder-chemo induced neutropenia      Musculoskeletal:         GI/Hepatic: Comment: 10/29/2019  IMPRESSION: In this patient with history of metastatic ovarian cancer,  1. Increase in intrahepatic biliary ductal dilation, which may be seen  in the setting of stent failure.  2. Overall worsening hepatic metastatic disease with increased size of  the ill-defined lesions in hepatic segment 6 and at the level of the  edmundo hepatis. There are additional hypodense infiltrative lesions  seen throughout the liver which are new from prior exam. Scalloping of  the hepatic contour suggestive of malignant ascites.  3. 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  4. Increased size of the bilateral pleural effusions, moderate to  large on the right and small on the left with associated atelectasis.  5. Resolution of the previously seen right hydronephrosis with  interval placement of right nephroureteral stent.  6. Redemonstration of the retroperitoneal and mesenteric  lymphadenopathy.    (+) cholecystitis/cholelithiasis,       Renal/Genitourinary:         Endo:     (+) type II DM thyroid problem (subclinical ) hypothyroidism, .      Psychiatric:  - neg psychiatric ROS       Infectious Disease:         Malignancy:   (+) Malignancy History of Other  Other CA ovarian CA w/ mets-->liver, brain, hilar and mediastinal lymphadenopathy  Active status post Surgery and Chemo         Other: Comment: 59 year old female with recurrent ovarian cancer, stage 4 with mets to  brain liver and lymphadenopathy presenting for ERCP for biliary obstruction as part of palliative care.   She is a Jahovah's witness, H/o thromboembolism, sree currently on anticoagulation.    (+) H/O Chronic Pain,H/O chronic opiod use ,                        PHYSICAL EXAM:   Mental Status/Neuro: A/A/O   Airway: Facies: Feasible  Mallampati: II  Mouth/Opening: Limited  TM distance: > 6 cm  Neck ROM: Full   Respiratory: Auscultation: CTAB     Resp. Rate: Normal     Resp. Effort: Normal      CV: Rhythm: Regular  Rate: Age appropriate  Heart: Normal Sounds  Edema: None   Comments: Very thin, jaundiced woman appearing chronically ill     Dental: Normal Dentition                LABS:  CBC:   Lab Results   Component Value Date    WBC 11.7 (H) 10/29/2019    WBC 14.3 (H) 10/28/2019    HGB 9.7 (L) 10/29/2019    HGB 12.2 10/28/2019    HCT 29.0 (L) 10/29/2019    HCT 35.9 10/28/2019     10/29/2019     10/28/2019     BMP:   Lab Results   Component Value Date     (L) 10/29/2019     (L) 10/28/2019    POTASSIUM 3.7 10/29/2019    POTASSIUM 4.1 10/28/2019    CHLORIDE 97 10/29/2019    CHLORIDE 96 10/28/2019    CO2 25 10/29/2019    CO2 25 10/28/2019    BUN 5 (L) 10/29/2019    BUN 5 (L) 10/28/2019    CR 0.53 10/29/2019    CR 0.56 10/28/2019     (H) 10/29/2019     (H) 10/28/2019     COAGS:   Lab Results   Component Value Date    PTT 35 10/28/2019    INR 1.49 (H) 10/28/2019    FIBR 540 (H) 10/28/2019     POC:   Lab Results   Component Value Date    BGM 99 10/04/2019    HCG Negative 08/20/2014     OTHER:   Lab Results   Component Value Date    LACT 1.2 10/28/2019    JOSE ALBERTO 7.8 (L) 10/29/2019    PHOS 3.5 10/29/2019    MAG 1.9 10/29/2019    ALBUMIN 1.2 (L) 10/29/2019    PROTTOTAL 5.3 (L) 10/29/2019    ALT 82 (H) 10/29/2019    AST 85 (H) 10/29/2019    GGT 21 08/10/2017    ALKPHOS 990 (H) 10/29/2019    BILITOTAL 10.6 (H) 10/29/2019    LIPASE 52 (L) 09/09/2019    AMYLASE 22 (L) 08/23/2019    TSH 5.83 (H)  "01/03/2013    T4 0.84 01/03/2013    CRP 70.3 (H) 09/24/2019    SED 73 (H) 09/24/2019        Preop Vitals    BP Readings from Last 3 Encounters:   10/29/19 96/50   10/28/19 102/61   10/26/19 94/44    Pulse Readings from Last 3 Encounters:   10/29/19 98   10/28/19 123   10/24/19 109      Resp Readings from Last 3 Encounters:   10/29/19 16   10/28/19 13   10/26/19 16    SpO2 Readings from Last 3 Encounters:   10/29/19 97%   10/28/19 100%   10/26/19 94%      Temp Readings from Last 1 Encounters:   10/29/19 36.9  C (98.5  F) (Oral)    Ht Readings from Last 1 Encounters:   10/28/19 1.651 m (5' 5\")      Wt Readings from Last 1 Encounters:   10/28/19 57.3 kg (126 lb 4.8 oz)    Estimated body mass index is 21.02 kg/m  as calculated from the following:    Height as of this encounter: 1.651 m (5' 5\").    Weight as of this encounter: 57.3 kg (126 lb 4.8 oz).     LDA:  Peripheral IV 10/28/19 Left;Posterior Lower forearm (Active)   Site Assessment WDL 10/29/2019  1:00 AM   Line Status Saline locked 10/29/2019  1:00 AM   Phlebitis Scale 0-->no symptoms 10/29/2019  1:00 AM   Infiltration Scale 0 10/29/2019  1:00 AM   Extravasation? No 10/28/2019  9:46 PM   Number of days: 1        Assessment:   ASA SCORE: 3    H&P: History and physical reviewed and following examination; no interval change.   Smoking Status:  Non-Smoker/Unknown   NPO Status: NPO Appropriate     Plan:   Anes. Type:  General   Pre-Medication: None   Induction:  IV (Standard) (modified RSI)   Airway: ETT; Oral   Access/Monitoring: PIV   Maintenance: Balanced     Advanced Monitoring: BIS     Postop Plan:   Postop Pain: Opioids  Postop Sedation/Airway: Not planned     PONV Management:   Adult Risk Factors: Female, Non-Smoker, Postop Opioids   Prevention: Ondansetron, Dexamethasone     CONSENT: Direct conversation   Plan and risks discussed with: Patient   Blood Products: Refusal (SOME/ALL Blood Products)  Reason for Refusal: Rastafari  Willing to accept: Albumin "       Comments for Plan/Consent:  ______________________________________________________________________  Pt with increasing pleural effusions, worse on right.  DTP on right.  PT does feel increase in dyspnea.  Has had 2 thoracenteses in last ~10days, last 10/24.  Discussed option of delaying ERCP for repeat thoracentesis, but unclear if the risk of delaying ERCP would outweigh benefit of thoracentesis regarding postoperative risk.  Would be beneficial to discuss repeat thoracentesis vs. Pleurex with primary team/IR.  I discussed the risks and benefits of general anesthesia with the patient, specifically increased risk of postoperative respiratory  Questions were sought and answered.      Karthikeyan Weir MD  Attending Anesthesiologist                   Lui Bradshaw MD

## 2019-10-29 NOTE — PLAN OF CARE
Hypotensive with lowest BP of 84/50, Gyn/Onc team aware. Gave pt a 500 ml NS bolus with improvement to 94/50. Pt is otherwise afebrile. On 2L of cont. O2 via nasal cannula for GROVES and mild SOB at rest. Given a PRN duoneb. Pt is sating in the mid to upper 90's on 2L. Lung sounds are diminished. Plan for possible thoracentesis in the next day. Denies pain on scheduled MS contin and Marinol. Passing flatus and had a loose BM this morning. Voiding adequate amounts, urine is icteric and dark. Pt is jaundice. Alert and oriented x4. Pt went down to 3C at 1245 for and ERCP, possible bili stent exchange. Pt had a abdominal CT, chest x ray and bilateral ultrasound of legs done today. Ultrasound negative for DVT's. Pt has plus 1-2 edema in BLE. Abdomen slightly distended. Possible paracentesis in the next day. Mag and phos re checks WNL. Up with assist of one. Blanchable redness noted on buttocks. Continue to monitor BP, respiratory status, pain, liver function and encourage OOB activity.

## 2019-10-29 NOTE — CONSULTS
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 Recent hospitalization with Klebsiella bacteremia, likely biliary source - has been on antibiotic course (Augmentin) until planned repeat ERCP on 11/15. CT scan abd/pelv with IV contrast obtained this morning showing increase in intrahepatic biliary duct dilation and overall worsening hepatic metastatic disease with increased size of lesion in segment 6 as well as additional hypodense infiltrative lesions throughout the liver, new from prior exam.      Unclear exactly what is driving her cholestatic liver injury -- could certainly be a combination of Augmentin induced cholestasis as well as biliary obstruction from occluded biliary stent. She is not having fevers, rigors or significant abdominal pain to suggest cholangitis but with new/worsening biliary dilation on CT we will plan for ERCP today for stent evaluation/likely removal or exchange.     Recommendations:  CT scan abd/pelv with IV contrast   ERCP today in OR  Continue NPO status  Continue antibiotics (avoid Augmentin)  Trend LFT   Analgesia/Antiemetics per primary team  Discussed with primary gyn/onc team    Gastroenterology follow up recommendations: TBD    Thank you for involving us in this patient's care. Please do not hesitate to contact the GI service with any questions or concerns.     Pt seen and care plan discussed with Dr. Londono, GI staff physician.    Lizzy Shannon PA-C  Advanced Endoscopy/Pancreaticobiliary [facility]  [facility]  Pager  "[phone]  -------------------------------------------------------------------------------------------------------------------       Reason for Consultation:   \"concern for biliary obstruction\"           History of Present Illness:   Patient seen and examined at 0945. History is obtained from the patient and her SO at bedside.    Odalys Nathan is a 59 year old female with a PMH significant for recurrent ovarian cancer with metastasis to liver and brain status post craniotomy, tumor resection, gamma knife as well as ERCP in August for chronic cholecystitis and biliary stricture.  Recent admission 9/10 for headache, worsening abdominal pain, found to be bacteremic with Klebsiella. Also was found to have new brain met, ERCP was deferred at that time and she has been continued on antibiotics until planned repeat ERCP 11/15. Recently underwent R craniotomy and tumor resection 9/18.  She was seen in clinic yesterday and noted to be very jaundiced.  Laboratory evaluation showed elevated total bilirubin to 12.7, alk phos 1184,  and .  Liver tests were most recently checked on October 18, 2011 days ago and total bilirubin was normal, alk phos 825.  She was admitted for further evaluation of biliary obstruction.    On admission her vitals included temperature of 95.7 F, heart rate 117, blood pressure 91/58, respiratory rate 16, 93% oxygen saturation on room air.  She was placed on 2 L of oxygen via nasal cannula.  No fevers since admission.  Her heart rate has improved in the 80s to 90s today.  Her blood pressures have been on the low side, this morning 94/53.  Labs today include total bilirubin 10.6, alk phos 990, ALT 82 and AST 85.  WBC 14.3 on admission, 11.7 today.  INR 1.49. CT scan completed this morning results are below.  Her oral antibiotics that she was taking as outpatient (Augmentin) were transitioned to IV Zosyn.    Previous Procedures:  ERCP 8/23/2019 - Dr. Lloyd  - Difficult gallbladder " cannulation, but ultimately                        successful.                        - Biliary sphincterotomy performed                        - Cystic duct dilated to 4 mm and a 7 Fr x 15 cm DPT                        Compass stent placed into the gallbladder.                        - Balloon occlusion cholangiogram also demonstrated high                        grade stenosis of the left main with upstream dilation                        and stenosis of the right main and right posterior                        likely due to tumor infiltration (Bismuth IVb).                        - While the patient is not currently jaundice, clearly                        there has been a slowly rising alk phos most indicative                        of liver infiltration from her ovarian cancer and there                        is also a clear stenosis of the left main and the right                        posterior ducts. At this point, the intrahepatic biliary                        system is now contaminated and without adequate drainage                        she likely will develop cholangitis therefore the                        decision was made to commit to biliary stenting to avoid                        this complication and because I suspect this patient                        will become jaundice very soon without intervention.                        - The left main hepatic duct was successfully dilated to                        4 mm.                        - 7 Fr x 15 cm SPT Zimmon stent placed into the left                        intrahepatic duct                        - Unable to pass a stent into the right posterior duct                        (7Fr or 5 Fr) after extened efforts. Since there was                        adequate drainage of the right lobe and no upstream                        dilation the importance of draining this sector is not                        as clear. She also has clear tumor burden on  imaging in                        segement VI so may not benefit from stenting this                        segement anyway.                        - MODIFIER 22 given complexity of procedure and 3 hours                        of endoscopic time required with the use of multiple                        devices.             Past Medical History:   Reviewed and edited as appropriate  Past Medical History:   Diagnosis Date     Antiplatelet or antithrombotic long-term use      Ascites      Blood clot in the legs      Diabetes (H)      Ovarian cancer (H)     serous,stg IV     Pleural effusion      Pulmonary embolism (H) 2/2012     Refusal of blood transfusions as patient is [employer]      Short gut syndrome      Subclinical hypothyroidism 4/18/2013     Thrombosis of leg             Past Surgical History:   Reviewed and edited as appropriate   Past Surgical History:   Procedure Laterality Date     COLECTOMY       COLONOSCOPY  2/1/2012    Procedure:COLONOSCOPY; With Biopsy; Surgeon:TONI SULLIVAN; Location:[facility] OR     CYSTOSCOPY, RETROGRADES, INSERT STENT URETER(S), COMBINED Right 10/4/2019    Procedure: CYSTOSCOPY, WITH RETROGRADE PYELOGRAM AND URETERAL STENT INSERTION;  Surgeon: Wolf Gan MD;  Location: [facility]     ENDOSCOPIC RETROGRADE CHOLANGIOPANCREATOGRAM N/A 8/23/2019    Procedure: Endoscopic Retrograde Cholangiopancreatogram with 4 mm Hurricane Balloon Dilation, gallbladder stent and Bile Duct stent placements, Bilarry Sphincterotomy ;  Surgeon: Catrachito Lloyd MD;  Location: [facility] OR     HYSTERECTOMY TOTAL ABD, RHIANNA SALPINGO-OOPHORECTOMY, NODE DISSECTION, TUMOR DEBULKING, COMBINED  2/1/2012    Procedure:COMBINED HYSTERECTOMY TOTAL ABDOMINAL, BILATERAL SALPINGO-OOPHORECTOMY, NODE DISSECTION, TUMOR DEBULKING;  Exploratory Laparotomy, Total Abdominal Hysterectomy, Bilateral Salpingo-Oophorectomy, appendectomy,lysis of adhesions, ileal, ascending, transverse and splenic flexure resection, ileal descending  bowel renanastomosis, incidental cystotomy repair, CUSA procedure and colonoscopy ; [facility]     INSERT PORT PERITONEAL ACCESS  4/3/2012    Procedure:INSERT PORT PERITONEAL ACCESS; Intraperitoneal Port Placement (c-arm); Surgeon:SAMUEL CARRASCO; Location:[facility] OR     INSERT PORT PERITONEAL ACCESS  5/14/2014    Procedure: INSERT PORT PERITONEAL ACCESS;  Surgeon: Nga Yeung MD;  Location: [facility] OR     INSERT PORT VASCULAR ACCESS       IR PORT REMOVAL RIGHT  9/20/2019     LAPAROSCOPY DIAGNOSTIC (GYN)  5/14/2014    Procedure: LAPAROSCOPY DIAGNOSTIC (GYN);  Surgeon: Nga Yeung MD;  Location: [facility] OR     LAPAROTOMY EXPLORATORY Right 11/25/2015    Procedure: LAPAROTOMY EXPLORATORY;  Surgeon: Nga Yeung MD;  Location: [facility]     LAPAROTOMY, TUMOR DEBULKING, COMBINED  5/14/2014    Procedure: COMBINED LAPAROTOMY, TUMOR DEBULKING;  Surgeon: Nga Yeung MD;  Location: [facility] OR     OPTICAL TRACKING SYSTEM CRANIOTOMY, EXCISE TUMOR, COMBINED Right 9/18/2019    Procedure: Stealth Assisted Right Craniotomy And Tumor Resection;  Surgeon: Bertin Dewey MD;  Location: [facility] OR     PICC INSERTION Left 09/20/2019    5Fr - 46cm (4cm external), basilic vein, low SVC     REMOVE CATHETER PERITONEAL N/A 8/20/2014    Procedure: REMOVE CATHETER PERITONEAL;  Surgeon: Nga Yeung MD;  Location: [facility] OR     VASCULAR SURGERY      stent left iliac vein              Social History:   The patient lives in [place], [place] with her            Family History:   Reviewed and edited as appropriate  Family History   Problem Relation Age of Onset     Cancer Mother 69        lung, smoker     Cancer Maternal Uncle 65        brain     Colon Cancer Maternal Aunt 80        colon             Allergies:   Reviewed and edited as appropriate   No Known Allergies         Medications:     Current Facility-Administered Medications   Medication     0.9% sodium chloride + KCl 20 mEq/L infusion     0.9% sodium chloride  BOLUS     albuterol (PROAIR HFA/PROVENTIL HFA/VENTOLIN HFA) 108 (90 Base) MCG/ACT inhaler 2 puff     diphenoxylate-atropine (LOMOTIL) 2.5-0.025 MG per tablet 1-2 tablet     dronabinol (MARINOL) capsule 2.5 mg     ipratropium - albuterol 0.5 mg/2.5 mg/3 mL (DUONEB) neb solution 3 mL     levothyroxine (SYNTHROID/LEVOTHROID) tablet 50 mcg     lidocaine (LMX4) cream     lidocaine 1 % 0.1-1 mL     loperamide (IMODIUM) capsule 2 mg     magnesium sulfate 4 g in 100 mL sterile water (premade)     melatonin tablet 1 mg     morphine (MS CONTIN) 12 hr tablet 15 mg     naloxone (NARCAN) injection 0.1-0.4 mg     ondansetron (ZOFRAN-ODT) ODT tab 4 mg    Or     ondansetron (ZOFRAN) injection 4 mg     oxyCODONE (ROXICODONE) tablet 5 mg     pantoprazole (PROTONIX) EC tablet 40 mg     phytonadione (MEPHYTON/VIT K) tablet 5 mg     piperacillin-tazobactam (ZOSYN) 3.375 g vial to attach to  mL bag     polyethylene glycol (MIRALAX/GLYCOLAX) Packet 17 g     potassium chloride (KLOR-CON) Packet 20-40 mEq     potassium chloride 10 mEq in 100 mL intermittent infusion with 10 mg lidocaine     potassium chloride 10 mEq in 100 mL sterile water intermittent infusion (premix)     potassium chloride 20 mEq in 50 mL intermittent infusion     potassium chloride ER (K-DUR/KLOR-CON M) CR tablet 20-40 mEq     prochlorperazine (COMPAZINE) injection 10 mg    Or     prochlorperazine (COMPAZINE) tablet 10 mg    Or     prochlorperazine (COMPAZINE) Suppository 25 mg     sodium chloride (PF) 0.9% PF flush 3 mL     sodium chloride (PF) 0.9% PF flush 3 mL     sodium phosphate 15 mmol in D5W intermittent infusion     sodium phosphate 20 mmol in D5W intermittent infusion     sodium phosphate 25 mmol in D5W intermittent infusion     tamsulosin (FLOMAX) capsule 0.4 mg             Review of Systems:     A complete review of systems was performed and is negative except as noted in the HPI               Physical Exam:   Temp: (P) 96.8  F (36  C) Temp src: (P)  Oral BP: (!) (P) 84/50 Pulse: (P) 83   Resp: 14 SpO2: (P) 99 % O2 Device: (P) Nasal cannula Oxygen Delivery: (P) 2 LPM  Wt:   Wt Readings from Last 2 Encounters:   10/28/19 57.3 kg (126 lb 4.8 oz)   10/28/19 57.3 kg (126 lb 6.4 oz)        General: Chronically ill, pleasant female in NAD.  Answers appropriately.    ENT: Sclera +icteric. No conjunctival injection.  Oropharynx is clear, moist and w/o exudate or lesions.  Neck: No masses or thyromegaly.  Lungs: Clear to auscultation bilaterally/anteriorly  Heart: Regular rate and rhythm.  No murmurs, gallops or rubs.  Normal S1 and S2.  Abdomen: Soft, non-tender, non-distended.  BS +.  No rebound or peritoneal signs  Extremities: WWP, no pedal edema.  Heme/Lymph: No cervical or supraclavicular adenopathy.   Skin: ++ jaundice, no rash  Neurologic: Grossly non-focal.  CN 2-12 grossly intact.            Data:   Labs and imaging below were independently reviewed and interpreted    LAB WORK:    BMP  Recent Labs   Lab 10/29/19  0539 10/28/19  2220 10/28/19  1757 10/26/19  0704 10/25/19  0730   *  --  129* 131* 131*   POTASSIUM 3.7  --  4.1 4.1 4.4   CHLORIDE 97  --  96 99 102   JOSE ALBERTO 7.8*  --  9.1 8.1* 7.8*   CO2 25  --  25 25 23   BUN 5*  --  5* 11 10   CR 0.53 0.56 0.56 0.60 0.54   *  --  113* 110* 141*     CBC  Recent Labs   Lab 10/29/19  0539 10/28/19  2220 10/28/19  1757 10/25/19  0730 10/24/19  1509   WBC 11.7*  --  14.3* 15.1* 19.3*   RBC 3.32*  --  4.11 3.31* 3.75*   HGB 9.7*  --  12.2 9.7* 11.2*   HCT 29.0*  --  35.9 29.3* 33.8*   MCV 87  --  87 89 90   MCH 29.2  --  29.7 29.3 29.9   MCHC 33.4  --  34.0 33.1 33.1   RDW 16.8*  --  17.1* 16.3* 16.3*    345 418 290 326     INR  Recent Labs   Lab 10/28/19  2220   INR 1.49*     LFTs  Recent Labs   Lab 10/29/19  0539 10/28/19  1757   ALKPHOS 990* 1,184*   AST 85* 107*   ALT 82* 106*   BILITOTAL 10.6* 12.7*   PROTTOTAL 5.3* 6.6*   ALBUMIN 1.2* 1.6*      PANCNo lab results found in last 7  days.    IMAGING:  EXAMINATION: CT ABDOMEN PELVIS W CONTRAST, 10/29/2019 8:47 AM     TECHNIQUE:  Helical CT images from the lung bases through the  symphysis pubis were obtained with IV contrast. Contrast dose:  iopamidol (ISOVUE-370) solution 77 mL     COMPARISON: Same-day radiograph and CT 9/9/2019     HISTORY: Abd distension; jaundice, concern for biliary obstruction,  possible need for biliary stent replacement.     FINDINGS:     Abdomen and pelvis: Stable position of the 2 biliary stents with the  distal tip of the stents in the second segment of the duodenum. There  is been interval worsening of the intrahepatic biliary ductal dilation  with dilated left bile ducts measuring up to 1.0 cm, previously  measuring 4 mm in maximal diameter. Decreased pneumobilia in  comparison with prior exam. There is a small focus of air in the  extrahepatic common bile duct. The gallbladder is persistently dilated  with one of the biliary stents in the gallbladder fundus. The common  bile duct and pancreatic duct are normal in caliber.     Increased size of the ill-defined, heterogeneously enhancing mass in  hepatic segment 6 now measuring up to 6.6 x 4.9 cm, previously 4.3 x  4.2 cm. The infiltrative, hypodense, heterogeneous mass at the level  of the edmundo hepatis there is poorly defined however appears increased  in size (series 3 image 58). There are new hypodense infiltrative  opacities throughout the hepatic parenchyma suggestive of worsening  metastatic liver disease. Scalloping of the hepatic contours, which  can be seen with malignant ascites.     No significant change in the occlusion of the extrahepatic portal vein  from the infiltrative mass at the level of the edmundo hepatis. The  right and left portal veins are patent. The SMV/main portal vein  confluence is patent. The superior mesenteric vein and splenic veins  are patent. Numerous portosystemic collaterals are again demonstrated.     The spleen, pancreas and  right adrenal gland are within normal limits.  Small splenule. Persistent unchanged thickening of the left adrenal  gland.     Symmetric renal cortical enhancement bilaterally. Interval placement  of a right nephroureteral stent with resolution of the previously seen  right hydronephrosis. No suspicious renal mass. No hydronephrosis or  nephrolithiasis. The urinary bladder is partly distended without focal  abnormality.     Postsurgical changes of partial left hemicolectomy with primary  colonic anastomosis. There is no significant change in the multiple  mildly dilated loops of small bowel with bowel wall thickening and  mucosal enhancement in the inferior central abdomen. No definite  transition point is identified.     Increased ascites seen in all abdominal quadrants. Diffuse mesenteric  edema. Innumerable enlarged mesenteric and retroperitoneal lymph nodes  increased from prior.     The abdominal aorta and its branch vessels are normal in caliber and  patent. Unchanged left common iliac vein stent. No abdominal aortic  aneurysm.     Lung bases: Increased size of bilateral pleural effusions, moderate to  large on the right and small on the left. Associated compressive  atelectasis is seen bilaterally.  The heart is normal in size. No  pericardial effusion.     Bones and soft tissues: Mild degenerative changes. No suspicious  osseous lesion.                                                                      IMPRESSION: In this patient with history of metastatic ovarian cancer,  1. Increase in intrahepatic biliary ductal dilation, which may be seen  in the setting of stent failure.  2. Overall worsening hepatic metastatic disease with increased size of  the ill-defined lesions in hepatic segment 6 and at the level of the  edmundo hepatis. There are additional hypodense infiltrative lesions  seen throughout the liver which are new from prior exam. Scalloping of  the hepatic contour suggestive of malignant  ascites.  3. 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  4. Increased size of the bilateral pleural effusions, moderate to  large on the right and small on the left with associated atelectasis.  5. Resolution of the previously seen right hydronephrosis with  interval placement of right nephroureteral stent.  6. Redemonstration of the retroperitoneal and mesenteric  lymphadenopathy.           =======================================================================

## 2019-10-29 NOTE — ANESTHESIA POSTPROCEDURE EVALUATION
Anesthesia POST Procedure Evaluation    Patient: Odalys Nathan   MRN:     [id number] Gender:   female   Age:    59 year old :      1960        Preoperative Diagnosis: Biliary obstruction [K83.1]   Procedure(s):  ENDOSCOPIC RETROGRADE CHOLANGIOPANCREATOGRAPH WITH BILARY STENT EXCHANGE, DEBRIDE AND STONE REMOVAL    Postop Comments: No value filed.       Anesthesia Type:  Not documented  General    Reportable Event: NO     PAIN: Uncomplicated   Sign Out status: Comfortable, Well controlled pain     PONV: No PONV   Sign Out status:  No Nausea or Vomiting     Neuro/Psych: Uneventful perioperative course   Sign Out Status: Preoperative baseline; Age appropriate mentation     Airway/Resp.: Uneventful perioperative course   Sign Out Status: Non labored breathing, age appropriate RR; Resp. Status within EXPECTED Parameters     CV: Uneventful perioperative course   Sign Out status: Appropriate BP and perfusion indices; Appropriate HR/Rhythm     Disposition:   Sign Out in:  PACU  Disposition:  Floor  Recovery Course: Uneventful  Follow-Up: Not required           Last Anesthesia Record Vitals:  CRNA VITALS  10/29/2019 1444 - 10/29/2019 1544      10/29/2019             SpO2:  94 %    EKG:  Sinus tachycardia          Last PACU Vitals:  Vitals Value Taken Time   /56 10/29/2019  4:00 PM   Temp 37.5  C (99.5  F) 10/29/2019  3:45 PM   Pulse 112 10/29/2019  4:00 PM   Resp 24 10/29/2019  4:00 PM   SpO2 95 % 10/29/2019  4:02 PM   Temp src     NIBP 97/45 10/29/2019  3:19 PM   Pulse     SpO2 94 % 10/29/2019  3:20 PM   Resp     Temp     Ht Rate 111 10/29/2019  3:19 PM   Temp 2     Vitals shown include unvalidated device data.      Electronically Signed By: Payton Avilse MD, 2019, 4:03 PM

## 2019-10-29 NOTE — PROGRESS NOTES
"CLINICAL NUTRITION SERVICES - ASSESSMENT NOTE     Nutrition Prescription    RECOMMENDATIONS FOR MDs/PROVIDERS TO ORDER:  Diet advance as soon as medically appropriate.  Order Boost Plus between meals once diet advances to at least full liquids.     Malnutrition Status:    Severe malnutrition in the context of chronic illness    Recommendations already ordered by Registered Dietitian (RD):  None at this time     Future/Additional Recommendations:  Once diet advances to > full liquids, start calorie counts.  Monitor weight trends.  If eating < 60% estimated needs (1100 kcals and 50 g protein) and/or weight trends down below lowest recent wt 54 kg, consider need to start nutrition support.     REASON FOR ASSESSMENT  Odalys Nathan is a/an 59 year old female assessed by the dietitian for Provider Order - \"severe malnutrition\"    NUTRITION HISTORY  Obtained information from chart review, pt busy or in OR during attempts to visit. Pt assessed by Nutrition Services twice in the past month (10/18 at [facility] and most recently 10/25 at [facility]).  Pt has c/o decreased PO intakes 2/2 decreased appetite, early satiety, and taste changes for the past few months (<75% for >/= 1 month per RD note on 10/25).   Has been drinking Boost, but not consistently    CURRENT NUTRITION ORDERS  Diet: NPO  Intake/Tolerance: NPO since midnight for procedure today.  Was on regular diet last evening before midnight    LABS  Labs reviewed  - Na+ 130 (L)  - BUN 5 (L), may indicate low protein intake  - LFTs elevated (Tbili 10.6, Alk Phos 990, ALT 82, AST 85)    MEDICATIONS  Medications reviewed  - Marinol BID  - IVF @ 100 mL/hr    ANTHROPOMETRICS  Height: 165.1 cm (5' 5\")  Most Recent Weight: 57.3 kg (126 lb 4.8 oz)    IBW: 56.8 kg   BMI: Normal BMI  Weight History: Up 8 lbs over the past 11 days, suspect fluid related?  Overall 20 lb wt loss from 6/13/19 to 10/17/19 (14% wt loss in 4 months)  Wt Readings from Last 30 Encounters: "   10/28/19 57.3 kg (126 lb 4.8 oz)   10/28/19 57.3 kg (126 lb 6.4 oz)   10/25/19 55.7 kg (122 lb 14.4 oz)   10/17/19 54.8 kg (120 lb 12.8 oz)   10/17/19 53.8 kg (118 lb 8 oz)   10/15/19 54.7 kg (120 lb 8 oz)   10/10/19 54.9 kg (121 lb)   10/09/19 55 kg (121 lb 4.8 oz)   10/03/19 53.4 kg (117 lb 11.6 oz)   10/03/19 53.2 kg (117 lb 4.8 oz)   09/24/19 53.5 kg (118 lb)   09/19/19 52.6 kg (115 lb 15.4 oz)   09/11/19 60.9 kg (134 lb 3.2 oz)   09/05/19 54.6 kg (120 lb 6.4 oz)   08/29/19 57.3 kg (126 lb 4 oz)   08/23/19 58 kg (127 lb 13.9 oz)   08/15/19 60.6 kg (133 lb 8 oz)   08/08/19 59.9 kg (132 lb)   07/15/19 61.3 kg (135 lb 2 oz)   07/11/19 61.5 kg (135 lb 8 oz)   07/03/19 61.6 kg (135 lb 14.4 oz)   06/27/19 62 kg (136 lb 9.6 oz)   06/20/19 62 kg (136 lb 9.6 oz)   06/13/19 63 kg (138 lb 12.8 oz)   06/06/19 62.2 kg (137 lb 3.2 oz)   05/30/19 62.6 kg (138 lb)   05/23/19 64.5 kg (142 lb 1.6 oz)   05/16/19 63 kg (138 lb 12.8 oz)   05/07/19 64 kg (141 lb)   04/23/19 64.6 kg (142 lb 8 oz)      Dosing Weight: 54 kg (actual, lowest recent wt 53.8 kg on 10/17)    ASSESSED NUTRITION NEEDS  Estimated Energy Needs: 6737-2482 kcals/day (30 - 35 kcals/kg)  Justification: Increased needs with malignancy and Repletion  Estimated Protein Needs: 65-98 grams protein/day (1.2 - 1.5 grams of pro/kg)  Justification: Hypercatabolism with acute illness and Repletion  Estimated Fluid Needs: (1 mL/kcal)   Justification: Maintenance, or other per provider pending fluid status    PHYSICAL FINDINGS  See malnutrition section below.    MALNUTRITION  % Intake: </=75% for >/= 1 month (severe)  % Weight Loss: > 7.5% in 3 months (severe)  Subcutaneous Fat Loss: Unable to assess  Muscle Loss: Unable to assess  Fluid Accumulation/Edema: Trace-mild right and left foot and leg edema  Malnutrition Diagnosis: Severe malnutrition in the context of chronic illness    NUTRITION DIAGNOSIS  Inadequate protein-energy intake related to NPO since midnight and  decreased appetite, early satiety, and taste changes PTA as evidenced by eating <75% of estimated needs for at least the past few months and 14% wt loss over the past 4 months    INTERVENTIONS  Implementation  Nutrition Education: Unable to complete due to pt not in room or busy with cares during visits     Goals  Diet adv v nutrition support within 2-3 days.     Monitoring/Evaluation  Progress toward goals will be monitored and evaluated per protocol.    Jeimy Kiser RD, LD  [address] RD pager: [phone]

## 2019-10-29 NOTE — H&P
[facility] History and Physical    Odalys Nathan MRN# [id number]   Age: 59 year old YOB: 1960     Date of Admission:  10/28/2019    Primary care provider: Aubrie Hester             Chief Complaint:   Jaundice         History of Present Illness:   This patient is a 59 year old female with recurrent ovarian cancer who presents with jaundice. She was seen in clinic today to discuss disease management. She underwent labs due to jaundice which were notable for hyponatremia, elevated Alkaline phosphatase, transaminases, total bilirubin. She was admitted for concern of biliary obstruction.     She reports noticing increasing yellow of her skin over the last 3 days. She also notes dark color urine, similar to coffee. She denies abdominal pain. She has had persistent difficulty with eating recently and has been instructed by Nutrition to supplement with Boost. She has been doing this as much as possible though food does not taste good. She denies change in her abdominal distension or bowel changes. She has chronic diarrhea and typically has 4 loose stools daily through her ostomy. Denies fever, chills.             Cancer Treatment History:   1/18/2012 - Admitted to hospital for 2 weeks of intermittent abdominal cramping, distention, diarrhea and N/V. CT of abdomen/pelvis significant for small bowel obstruction, a heterogenous soft tissue density in the pelvis, omental nodules, and ascites. Bilateral adnexal masses per U/S of pelvis. CA-125 was elevated at 987, CEA was normal at 1.0.    1/18/12 - Therapeutic paracentesis (4 L) with cytology confirming malignancy (FL positive, weak ER positive, CK-7 positive consistent with GYN primary). Surgery recommended d/t potential for falling blood counts 2/2 chemotherapy (patient is Faith and limiting blood transfusion)    2/1/12 - Exploratory laparotomy, MAR BSO, lysis of adhesion, Appendectomy, Repair cystotomy, Omentectomy  Toxn-dymhba-jkvctqnoy-transverse-colon resection, Ileo-descending colon anastomosis, CUSA, & Colonoscopy (done by Dr. Amato and colorectal team).  2/20/2012 - Admitted to hospital for bilateral pulmonary emboli and drainage of pleural effusion. Started on Lovenox.     2/28/12-3/21/12: Cycle #1-2 Carbo/Taxol IV   4/11/12-6/14/12: Cycle #3-6 IV chemo, Cycle #1 IV/IP chemo     7/16/12 -  8, CT PRADEEP - enrolled in  - observation arm  3/4/13-6/28/13:  6, 9, 12, 15, 20.  7/1/13: CT Chest, Abdomen, Pelvis IMPRESSION:  1. Worsening metastatic ovarian carcinoma suggested by increased size of soft tissue nodules anterior to the right psoas muscle, that may represent growing mesenteric lymphadenopathy.  2. Remaining prominent right lower quadrant mesenteric lymph nodes are not significantly changed.  3. Clustered nodular opacities in the right lower lobe are not significant change from 7/16/2012 and remain indeterminate. Again, the appearance and distribution is suggestive of an infectious etiology.  4. Stable 8 mm soft tissue nodule in left breast, unchanged. This can be observed on followup studies, but correlation with mammography could be considered.     7/11/13: The left ventricular ejection fraction is normal at 66.4%.  7/12/13-9/6/13: Cycle #1-3 Doxil/Carbo.  10, 11.    9/30/13 CT C/A/P Impression:  1. Overall, favorable response to treatment with decreasing size of soft tissue nodules tracking along the anterior aspect of the right psoas muscle.  2. 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  3. Improved cluster of right lower lobe pulmonary nodular opacities. These may represent resolving infection.  10/3/13-12/9/13:  9, 8, 10. Cycle 4-6 Doxil/Carbo.       1/13/14 CT C/A/P Impression:   1. Stable appearance of metastatic ovarian cancer. Scattered soft tissue nodules along the anterior aspect of the right psoas muscle are unchanged in size. Mild mesenteric lymphadenopathy is  "unchanged.  2. Clustered micronodules in the right lower lobe are unchanged from 9/30/2013, but improved from 7/1/2013. This history suggests a postinflammatory/postinfectious etiology.  3. 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  1/16/14 Discussed multiple options for her based on relatively stable-appearing disease on CT: chemo break with recheck of  in 1 month vs starting new chemo agent immediately vs exploratory surgery with possible resection of nodules.   2/17/14  16    2/20/14: Decision to take break from chemo for two months, followed by CT and CA-125.    4/21/14  27     4/21/14 CT C/A/P Impression:    1. Increased size of 2 low-attenuation lymph nodes anterior to the right psoas muscle is concerning for worsening metastatic ovarian cancer.    2. New circumferential thickening of a 3.8 cm length segment of distal transverse colon is likely physiologic. Recommend attention on followup imaging.    3. Grossly unchanged size of clustered small nodules versus scarring in the right lower lobe the lungs.    4. Stable thrombosis of the right ovarian vein.     5/14/14: Diagnostic laparoscopy converted to exploratory laparotomy and removal of mesenteric masses, tumor debulking, peritoneal biopsies and intraperitoneal port placement.   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 (2/2)  -Largest metastasis: 1.3 cm  -See comment  D: Peritoneum, right paracolic gutter #2, biopsy:  -Fibroadipose tissue with granulomatous inflammation surrounding refractile material  -Negative for malignancy   E: Small bowel adhesion, biopsy:  -Fibroconnective tissue, " "consistent with adhesion  -Negative for malignancy  F: Lymph nodes, mesentry, not otherwise specified, excision:  -Two lymph nodes, negative for metastatic carcinoma (0/2)  G: Lymph node, mesentery, \"#2\", excision:  -One lymph node, negative for metastatic carcinoma (0/1)  H: Lymph nodes, mesentery, \"nodule #2\", excision:  -Five lymph nodes, negative for metastatic carcinoma (0/5)      5/29/14: Cycle 1 IV PACLitaxel / IP CISplatin / IP PACLitaxel.  - 28.  6/26/14: Cycle #2 IV/IP.  10  8/5/14: CT chest/abd/pelvis IMPRESSION     1. In this patient with ovarian cancer, overall findings are indicative of stable/slight improvement, as multiple mesenteric lymphadenopathy and scattered nodular peritoneal soft tissue mass lesions appear unchanged or slightly smaller since 4/21/2014.    2. 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 Mild dilatation of the second and the third portion of the duodenum with a narrow SMA angle. This could represent SMA syndrome, if clinically correlated.17/14:    Cycle #3 IV/IP.  10.    8/7/14: Cycle #4 Taxol/Carbo (changed from IV/IP).  10. Switch to IV carbo/taxol as she is not tolerating IV/IP well.  8/20/14: Remove Intraperitoneal Port   8/28/14: Cycle #5 Taxol/Carbo held due to thrombocytopenia.  6.     9/29/14: Cycle #6 Taxol/Carbo  6.       10/16/14: CT Impression:    1. Nodular peritoneal soft tissue mass in the right lower quadrant adjacent to the psoas muscle is no longer appreciated. Adjacent prominent lymphadenopathy is unchanged from previous exam. No new peritoneal lesions.    2. 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     10/20/14:  5. CT chest/abdomen/pelvis on 10/16/14 showed nodular peritoneal soft tissue mass in the right lower quadrant adjacent to the psoas muscle is no longer appreciated. Adjacent prominent lymphadenopathy is unchanged from previous exam. No new peritoneal lesions and unchanged chronic " thrombosis of the right ovarian vein.  1/27/15:  6.  4/28/15:  14.  5/26/15:  18.     6/2/15: CT cap Impression:  1. Postsurgical changes of hysterectomy and bilateral salpingo-oophorectomy for ovarian cancer. New 8 mm hazy, ill-defined hypoattenuating lesion in hepatic segment 6 which is suspicious for a metastatic deposit.  2. Increased size of a left retroperitoneal lymph node which is indeterminate but may represent a ciro metastasis. Mildly prominent lymph nodes in the right lower quadrant are not significantly changed.    6/4/15: US abdomen IMPRESSION:    Hyperechoic lesion in the right hepatic lobe, consistent with hemangioma. This does not corresponds to the area of the lesion seen on CT from 6/2/2015. Recommend MR  6/12/15: MR abd IMPRESSION:  1. New 20 x 11 mm enhancing lesions between the right obliques, concerning for metastatic disease. This lesions should be amenable to percutaneous biopsy, if indicated.  2. Correlating to the lesion visualized on comparison CT is a hepatic segment 6 subcapsular 7 mm lesion. Overall the appearance favors the diagnosis of a simple cyst. However, there is faint suggestion of mild peripheral arterial enhancement.   3. Hepatic segment 6, 5 mm lesion too small to technically characterize. Differential would favor FNH, less likely flash filling hemangioma. Recommend attention on followup.  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      9/1/15: Cycle #1 Avastin/Cytoxan.   31.   9/24/15: Cycle #2 Avastin/Cytoxan.  19.  10/15/15: Cycle #3 Avastin/Cytoxan.  16.   11/6/15: CT c/a/p IMPRESSION:    1. Stable postoperative change of MAR/BSO for ovarian cancer.  2. The lesion in the right flank abdominal musculature is slightly decreased in size. Otherwise, stable examination.  2. No evidence of metastatic disease  in the chest.     11/25/15: surgical pathology report  FINAL DIAGNOSIS: Soft tissue, right oblique muscle mass, excision:  -Recurrent ovarian serous carcinoma  -Carcinoma is present less than 1 mm from one resection margin  -Background skeletal muscle and fibroadipose tissue     1/4/16:  22  1/11/16-1/27/16: Radiation to right flank x 12 treatments  4/7/2016:  94. CT cap IMPRESSION:    In this patient with ovarian cancer status post MAR/BSO and descending/transverse colectomy:  1. No evidence for malignancy in the chest, abdomen, or pelvis.  2. Stable small hypodense segment 6 liver lesion, appears more likely benign, possibly a cyst.  5/13/16:  124.  6/3/16: PET CT IMPRESSION: In this patient with a history of ovarian cancer:  1. Hypermetabolic and enlarging periaortic and perihepatic lymphadenopathy compatible with metastatic disease, as detailed above.  2. Although hypodense lesion in hepatic segment 6 has been present since 6/2/2015 associated hypermetabolism makes this lesion highly concerning for metastatic disease.     6/9/19: to start Niraparib under TESARO study.  137.  6/14/16: Cycle #1 Niraparib.    7/11/16: Cycle #2 Nraparib.   83.  8/3/2016: PET CT IMPRESSION:    1) New pleural based nodular opacities in the lateral and inferior aspects of the bilateral lower lobes, worse in the left lung. Likely infection. Close follow up is recommended.      2) Slight decrease in hypermetabolic abdominal lymphadenopathy. 2 hypermetabolic lymph nodes persist.  3) Unchanged right hepatic lobe metastatic lesion.   8/9/16: Cycle #3 Niraparib.  69.  9/6/16 and 9/13/16: Eval for potential cycle 4 niraparib. Dose held due to anemia.  10/6/19: Cycle #5 Niraparib  10/4/16: CT CAP impression:  1. There has been interval resolution of pleural-based nodular opacities which likely represented infection.  2. Abdominal lymphadenopathy in the form of 2 portacaval lymph nodes have not significantly  changed in size, noted to be hypermetabolic on prior PET/CT.  3. Previously demonstrated metastatic lesion in the right lobe of the liver is not significantly changed.  11/1/16: C6 niraparib,  75  11/29/16: C7 niraparib.  78. CT CAP impression as follows:  Target lesions (RECIST criteria): A previously described target lesion superior to the head of the pancreas (series 2, image 64)  (referred to as a perihepatic node on 6/3/2016) may not be a valid target lesion because it measured less than 1.5 cm originally. However, this particular node has decreased in size, now measuring 7 mm in short axis versus 14 mm on 6/3/2016 when measured in a similar fashion. 2.3 cm short axis portacaval lymph node on series 2 image 67, previously 2.0 cm on 10/4/2016. 1.2 cm subtle hypodensity in hepatic segment 6 on series 2 image 75, stable on multiple studies since at least 6/3/2016  Sum of diameters today: 3.5 cm. Sum of diameters 10/4/2016: 3.2 cm. Growth = 9%.    12/27/16: C8 niraparib.  105.  1/25/17: C9 niraparib.  108.  2/23/17: C10 niraparib.  132.   CT CAP impression: Sum of target lesion diameters today: 3.7 cm. Sum of target lesion diameters on 11/28/2016: 3.5 cm. Growth= 6%  1. In this patient with history of ovarian cancer there is stable disease by RECIST criteria as evidenced by:   1a. Mildly increased size of liver metastasis.  1b. Stable portacaval lymphadenopathy.  1c. No evidence of metastatic disease in the chest.  2. Trace emphysematous changes of the lungs.  3/22/17: C11 niraparib.  132.  4/19/17: C12 niraparib.  127.  5/16/17: CT CAP IMPRESSION: In this patient with ovarian cancer:  1. Mildly increased size of hepatic metastasis segment 6 with subtle increased capsular retraction.  2. Stable edmundo hepatis nodes, with mild increase in size of periaortic lymph nodes.  3. Prominent left supraclavicular lymph node with subtle increase in size compared to prior studies,  particularly comparing to 10/4/2016.  4. Mild subtle groundglass opacities in the right upper lobe, not present on prior study, lesser extent in the right lower lobe. Findings may represent infection, additional consideration is malignancy (less likely), and attention on follow-up study  Addendum:   Prominent left supraclavicular lymph node (3/25) is stable from most recent CT performed 2/20/2017, currently measuring 14 x 16 mm, previously 14 x 16 mm on 2/20/2017. The portal caval lymph node/edmundo hepatis lymph node is stable from 2/20/2017, measuring 21 mm. Clarification of size of the para-aortic lymph node (series 3 image 327). It short axis measurement is 11 mm versus 9 mm on prior study. Hepatic segment 6 triangular-shaped low density lesion (3/362) measures 14 mm, previously measured 12 mm, demonstrating a possible/questionable minimal subtle increase in size. Similarly the lymph nodes noted above in the supraclavicular and para-aortic regions demonstrate possible minimal possible subtle increase in size.  5/18/17: Cycle #13 Niraparib.  191.  6/15/17: Cycle #14 Niraparib.  146.  7/13/17: Cycle #15 Niraparib 200 mg.  171  8/9/17: CT IMPRESSION:  1. Segment 6 hepatic metastasis is stable to minimally increased in size.  2. Slight increase in multicentric adenopathy, most pronounced at the edmundo hepatis. Additional sites include the inferior left neck/supraclavicular region and retroperitoneum which appear stable to  minimally increased.      8/10/17:  175  9/14/17: MUGA LVEF 54%  9/22/17: C1D1 Carboplatin/Doxil.  187.  10/19/17: C2D1 carboplatin/Doxil.  108.  11/17/17: C3D1 carboplatin/Doxil.  82.  12/14/17: C4D1 carboplatin/Doxil/avastin.  83.  Of note, avastin held on C4D14  1/12/18: C5D1 carboplatin/Doxil/avastin.  80.  1/26/18: platelets 61, patient was not given avastin due to being jehova's witness.   2/9/18: C6D1 carboplatin/Doxil/Avastin.    71.  3/2/18:  49. Three month treatment break.  6/20/2018:  170. CT CAP:  IMPRESSION: In this patient with history of ovarian cancer:  1. Increased size of a right hepatic lobe lesion and numerous edmundo hepatis and retroperitoneal lymph nodes compatible with progression of metastatic disease.  2. New mild intrahepatic biliary ductal dilatation, periportal edema, and pericholecystic fluid. Increased soft tissue fullness in the edmundo hepatis in the expected location of the common hepatic duct. Findings  are suspicious for developing biliary ductal obstruction secondary to worsening metastatic disease in the edmundo hepatis. Correlation with liver function tests is recommended. Right upper quadrant ultrasound  may be beneficial.  3. Unchanged left supraclavicular and right hilar lymphadenopathy in the chest.      8/3/18: C1D1 weekly paclitaxel.  not done.  9/20/18: C2D1 weekly paclitaxel 80mg/m2. Ca 125-56  11/8/2018: C3D1 weekly paclitaxel;  26  12/17/18: Ct cap IMPRESSION:  1. New area of lobulated hypodense nodularity at the far-inferior right liver. This raises the possibility of a new area of hepatic metastasis.  2. Conversely, other nodules within the right liver are smaller suggesting improvement.  3. Improved adenopathy at the abdominal retroperitoneum. Improved left  supraclavicular adenopathy. Stable mildly prominent right hilar lymph nodes.  4. Previously noted ill-defined soft tissue at the edmundo hepatis region is still present but appears less prominent in size.  5. New finding of segmental wall thickening of the proximal sigmoid colon with some fluid distention of the adjacent colon. This could represent a segmental colitis. Other etiologies not excluded.  12/20/18:   19.  1/22/19:  45.  3/20/19: CT cap IMPRESSION:  1. Progression of disease at right inferior hepatic mass consistent with metastatic disease.  2. Increasing ill-defined multifocal regions of soft tissue at  the edmundo hepatis consistent with malignant adenopathy versus malignant implants. Associated increased intrahepatic biliary ductal dilatation.  3. Other areas of progressive adenopathy at the left neck base, mediastinum, and abdominal retroperitoneum.  4. New area of carcinomatosis medial to stomach at the left upper abdomen.     3/20/19: Cycle #1 weekly paclitaxel.   180.  5/7/19: Cycle #2 weekly paclitaxel.    142.  6/20/19: Cycle #3 weekly paclitaxel/  259.   7/11/19: CT CAP-IMPRESSION:  1. Slight increased size of the left supraclavicular lymph node. Slight progression of the mediastinal lymph nodes is also evident. New periesophageal node as described above. Retroperitoneal nodes are stable if not slightly smaller. Some of these nodes appear to have partially calcified suggesting a response.  2. Interval decreased size of the inferior right hepatic lobe lesion now with an area of central calcification or enhancement. No new liver lesions. No significant hydronephrosis.     8/8/19: C1 gemcitabine 800mg/m2 IV days 1&8.  446.  8/23/19: Endoscopic Retrograde Cholangiopancreatogram with 4 mm Hurricane Balloon Dilation, gallbladder stent and Bile Duct stent placements, biliary Sphincterotomy   8/29/19: C2 gemcitabine 800mg/m2 IV days 1&8.  548.  9/9/19-9/14/19: Hospital admission for HA/Fatigue/Confusion/worsening abd pain.  CT CAP: IMPRESSION:   1. Larger mass in inferior segment 6 in the liver, and increased infiltrative tissue in the edmundo hepatis which extends along the right portal portal branches and bile ducts, and inferiorly to the duodenum, which is potentially now involved by tumor as there is new focal wall edema and hypoenhancement at this location, versus postprocedural edema.  1a. The tumoral progression is in keeping with rising CA-125 levels.  2. Increased mass effect on the extrahepatic portal vein, with unchanged cavernous transformation and multiple collaterals.  3.  New biliary stents. Mildly increased intrahepatic biliary ductal dilatation. Increased pneumobilia, nonspecific given stents. Cholangitis is not excluded given potentially worsening obstruction.  4. New moderate right hydronephrosis and hydroureter. The right ureter appears potentially tethered to new enhancing tissue associated with the right ovarian vein concerning for focal worsening metastatic disease.  5. Mildly worsened retroperitoneal and mesenteric adenopathy.  6. New small volume ascites.  7. New mildly prominent loops of small bowel with thickened walls in the lower central abdomen. No transition point to suggest obstruction. Findings may be secondary to venous congestion given portal findings and worsening ascites, or possibly enteritis.  MRI Head: Impression:  1. Anterior right frontal axial mass appears predominantly centrally necrotic with scattered areas of intralesional hemorrhage. Abnormal enhancement primarily in the rim of the tumor consistent with viable tumor. Given the history of ovarian cancer, this is more likely metastatic, although a primary brain tumor could have a similar appearance.  2. Moderate surrounding vasogenic edema. Unchanged mass effect on the right cerebrum with 4 mm right-to-left midline shift and slight subfalcine herniation.  Per GI: No immediate procedures were required. At the time of discharge, she was tolerating a regular diet without nausea and vomiting. Dr. Lloyd's team plan for outpatient ERCP in the future. Urology was consulted and did not feel patient required a stent at this time as creat was normal at 0.52 and plan for outpatient follow up. Choliangitis on CT scan and subsequently started on zosyn. BC + for Klebsiella pneumonia. ID recommended 14 days of IV ceftriaxone end date 9/25. New brain mass noted on MRI. Started on decadron 4mg BID. Plan is for sheath assisted right craniotomy and tumor resection with neurosurgery on 9/18.      9/18/19 - 9/21/19: [facility]  admission. Stealth Assisted Right Craniotomy And Tumor Resection. Chest port removal. Decadron taper.  10/3/19: Dr Yeung follow up visit. Patient is planned for 11/15 biliary stent replacement planned: was on IV ceftriaxon 2 g IV until 9/25/2019 and then started Augmentin 875/125 mg po BID to continue until ERCP. Plan for single agent carbo followed by PARP I if she responds.  10/4/19: Placement of right ureteral stent.  10/7/19: Gamma Knife to right frontal lobe: Delivered in five fractions  10/22/19: ID follow up: Continue Augmentin for now. Follow-up appt for 11/22/19           Past Medical History:     Past Medical History:   Diagnosis Date     Antiplatelet or antithrombotic long-term use      Ascites      Blood clot in the legs      Diabetes (H)      Ovarian cancer (H)     serous,stg IV     Pleural effusion      Pulmonary embolism (H) 2/2012     Refusal of blood transfusions as patient is Muslim      Short gut syndrome      Subclinical hypothyroidism 4/18/2013     Thrombosis of leg             Past Surgical History:      Past Surgical History:   Procedure Laterality Date     COLECTOMY       COLONOSCOPY  2/1/2012    Procedure:COLONOSCOPY; With Biopsy; Surgeon:TONI SULLIVAN; Location:[facility] OR     CYSTOSCOPY, RETROGRADES, INSERT STENT URETER(S), COMBINED Right 10/4/2019    Procedure: CYSTOSCOPY, WITH RETROGRADE PYELOGRAM AND URETERAL STENT INSERTION;  Surgeon: Wolf Gan MD;  Location: [facility] OR     ENDOSCOPIC RETROGRADE CHOLANGIOPANCREATOGRAM N/A 8/23/2019    Procedure: Endoscopic Retrograde Cholangiopancreatogram with 4 mm Hurricane Balloon Dilation, gallbladder stent and Bile Duct stent placements, Bilarry Sphincterotomy ;  Surgeon: Catrachito Lloyd MD;  Location: [facility] OR     HYSTERECTOMY TOTAL ABD, RHIANNA SALPINGO-OOPHORECTOMY, NODE DISSECTION, TUMOR DEBULKING, COMBINED  2/1/2012    Procedure:COMBINED HYSTERECTOMY TOTAL ABDOMINAL, BILATERAL SALPINGO-OOPHORECTOMY, NODE DISSECTION, TUMOR DEBULKING;   Exploratory Laparotomy, Total Abdominal Hysterectomy, Bilateral Salpingo-Oophorectomy, appendectomy,lysis of adhesions, ileal, ascending, transverse and splenic flexure resection, ileal descending bowel renanastomosis, incidental cystotomy repair, CUSA procedure and colonoscopy ; [facility]     INSERT PORT PERITONEAL ACCESS  4/3/2012    Procedure:INSERT PORT PERITONEAL ACCESS; Intraperitoneal Port Placement (c-arm); Surgeon:SAMUEL CARRASCO; Location:[facility] OR     INSERT PORT PERITONEAL ACCESS  2014    Procedure: INSERT PORT PERITONEAL ACCESS;  Surgeon: Nga Yeung MD;  Location: [facility] OR     INSERT PORT VASCULAR ACCESS       IR PORT REMOVAL RIGHT  2019     LAPAROSCOPY DIAGNOSTIC (GYN)  2014    Procedure: LAPAROSCOPY DIAGNOSTIC (GYN);  Surgeon: Nga Yeung MD;  Location: [facility] OR     LAPAROTOMY EXPLORATORY Right 2015    Procedure: LAPAROTOMY EXPLORATORY;  Surgeon: Nga Yeung MD;  Location: [facility] OR     LAPAROTOMY, TUMOR DEBULKING, COMBINED  2014    Procedure: COMBINED LAPAROTOMY, TUMOR DEBULKING;  Surgeon: Nga Yeung MD;  Location: [facility] OR     OPTICAL TRACKING SYSTEM CRANIOTOMY, EXCISE TUMOR, COMBINED Right 2019    Procedure: Stealth Assisted Right Craniotomy And Tumor Resection;  Surgeon: Bertin Dewey MD;  Location: [facility] OR     PICC INSERTION Left 2019    5Fr - 46cm (4cm external), basilic vein, low SVC     REMOVE CATHETER PERITONEAL N/A 2014    Procedure: REMOVE CATHETER PERITONEAL;  Surgeon: Nga Yeung MD;  Location: [facility] OR     VASCULAR SURGERY      stent left iliac vein            Social History:     Social History     Tobacco Use     Smoking status: Former Smoker     Packs/day: 0.00     Years: 8.00     Pack years: 0.00     Types: Cigarettes     Last attempt to quit: 1980     Years since quittin.3     Smokeless tobacco: Never Used     Tobacco comment: started at 11 yo and quit at 18 yo   Substance Use Topics     Alcohol  use: Yes     Comment: 3x/day wine or jodi            Family History:     Family History   Problem Relation Age of Onset     Cancer Mother 69        lung, smoker     Cancer Maternal Uncle 65        brain     Colon Cancer Maternal Aunt 80        colon            Allergies:   No Known Allergies         Medications:   No current facility-administered medications on file prior to encounter.   albuterol (PROAIR HFA/PROVENTIL HFA/VENTOLIN HFA) 108 (90 Base) MCG/ACT inhaler, Inhale 2 puffs into the lungs every 6 hours as needed for shortness of breath / dyspnea or wheezing  amoxicillin-clavulanate (AUGMENTIN) 875-125 MG tablet, Take 1 tablet by mouth 2 times daily  Ascorbic Acid (VITAMIN C PO), Take 500 mg by mouth daily   Calcium Carbonate-Vitamin D (CALCIUM + D PO), Take 1 tablet by mouth daily.  clotrimazole 10 MG shane, Take 1 Shane (10 mg) by mouth 5 times daily (Patient not taking: Reported on 10/28/2019)  cyanocobalamin (VITAMIN B12) 1000 MCG/ML injection, Inject 1 mL (1,000 mcg) into the muscle every 30 days  diphenoxylate-atropine (LOMOTIL) 2.5-0.025 MG per tablet, Take 2 tablets by mouth 4 times daily as needed for diarrhea  dronabinol (MARINOL) 2.5 MG capsule, Take 1 capsule (2.5 mg) by mouth 2 times daily (before meals)  Ferrous Sulfate 324 (65 Fe) MG TBEC, TAKE ONE TABLET BY MOUTH THREE TIMES DAILY WITH MEALS. TAKE WITH A SMALL AMOUNT OF ORANGE JUICE. DO NOT TAKE WITH CALCIUM  HEMP OIL OR EXTRACT OR OTHER CBD CANNABINOID, NOT MEDICAL CANNABIS,,   HERBALS, daily   ipratropium - albuterol 0.5 mg/2.5 mg/3 mL (DUONEB) 0.5-2.5 (3) MG/3ML neb solution, Take 1 vial (3 mLs) by nebulization every 6 hours as needed for wheezing or shortness of breath / dyspnea  LEVOTHYROXINE SODIUM PO, Take 50 mcg by mouth daily   loperamide (IMODIUM) 2 MG capsule, Take 2 mg by mouth 4 times daily as needed for diarrhea  LORazepam (ATIVAN) 1 MG tablet, Take 1 tablet (1 mg) by mouth every 6 hours as needed (Anxiety, Nausea/Vomiting or  Sleep)  magnesium oxide (MAG-OX) 400 MG tablet, Take 1 tablet (400 mg) by mouth 2 times daily  medical cannabis (Patient's own supply), (The purpose of this order is to document that the patient reports taking medical cannabis.  This is not a prescription, and is not used to certify that the patient has a qualifying medical condition.)  morphine (MS CONTIN) 15 MG CR tablet, Take 1 tablet (15 mg) by mouth every 12 hours  naloxone (NARCAN) 4 MG/0.1ML nasal spray, Spray 1 spray (4 mg) into one nostril alternating nostrils once as needed for opioid reversal Every 2-3 minutes until patient responsive or EMS arrives  ondansetron (ZOFRAN-ODT) 4 MG ODT tab, Take 1-2 tablets (4-8 mg) by mouth every 6 hours as needed for nausea or vomiting (Patient not taking: Reported on 10/28/2019)  order for DME, Injection Supplies for Vitamin B12: 3cc syringes w/ 27 gauge needles, 1/2 inch length  oxyCODONE (ROXICODONE) 5 MG tablet, Take 1 tablet (5 mg) by mouth every 6 hours as needed for severe pain  pantoprazole (PROTONIX) 40 MG EC tablet, Take 40 mg by mouth daily as needed for heartburn  polyethylene glycol (MIRALAX/GLYCOLAX) packet, Take 1 packet by mouth daily  potassium chloride (KLOR-CON) 20 MEQ packet, Take 20 mEq by mouth daily  prochlorperazine (COMPAZINE) 10 MG tablet, Take 1 tablet (10 mg) by mouth every 6 hours as needed for nausea or vomiting  tamsulosin (FLOMAX) 0.4 MG capsule, Take 1 capsule (0.4 mg) by mouth daily For stent discomfort  VITAMIN E NATURAL PO, Take 100 Units by mouth daily             Review of Systems:     CONSTITUTIONAL: anorexia and weight loss Negative for  fevers, chills, fatigue,   SKIN: Negative for rashes, lumps, or bumps  HEENT: Negative for vision changes, changes in hearing, sinus drainage, sore throat  RESPIRATORY: shortness of breath, dyspnea Negative for  cough,  and wheezing, hemoptysis  CARDIOVASCULAR: Negative for  chest pain, dyspnea, palpitations, edema  GASTROINTESTINAL: diarrhea,  "Negative for nausea, vomiting, change in bowel habits,  constipation, abdominal pain, abdominal distention, reflux, hematemesis and hemtochezia  GENITOURINARY: Negative for frequency, dysuria, nocturia, urinary incontinence and hematuria  MUSCULOSKELETAL: Negative for muscle weakness, joint stiffness, joint swelling, back pain         Physical Exam:     Vitals:    10/28/19 1940 10/28/19 2010 10/28/19 2030 10/28/19 2101   BP: 91/58 107/49 95/40 101/51   Pulse: 117 113 112 110   Resp: 16      Temp: 95.7  F (35.4  C)      TempSrc: Oral      SpO2: 93% 96%  94%   Weight: 57.3 kg (126 lb 4.8 oz)      Height: 1.651 m (5' 5\")        Constitutional: Frail appearing female, no acute distress  HEENT: Normal appearance.  Neck supple, thyroid normal in size without nodularity or masses.  Cardiovascular: Tachycardic, regular rhythm without murmurs, clicks, gallops or rub  Respiratory: Decreased breath sounds at bases of lungs. Clear to auscultation bilaterally without crackles or wheeze  Gastrointestinal: Abdomen soft, non-tender. BS normal. No masses, organomegaly  Neuro: Moving all extremities spontaneously, A&Ox3  Extremities: Bilateral lower extremity edema R>L. Right +3 pitting edema, Left +2 pitting edema. No calf tenderness. Negative Daniel's sign.  Skin: Diffuse jaundice of all visible skin.   Psychiatric: mentation appears normal and affect normal/bright         Data:     Results for orders placed or performed during the hospital encounter of 10/28/19 (from the past 24 hour(s))   Magnesium   Result Value Ref Range    Magnesium 1.2 (L) 1.6 - 2.3 mg/dL   Phosphorus   Result Value Ref Range    Phosphorus 2.4 (L) 2.5 - 4.5 mg/dL   Lactic acid whole blood   Result Value Ref Range    Lactic Acid 1.2 0.7 - 2.0 mmol/L   INR   Result Value Ref Range    INR 1.49 (H) 0.86 - 1.14   Platelet count   Result Value Ref Range    Platelet Count 345 150 - 450 10e9/L     *Note: Due to a large number of results and/or encounters for the " requested time period, some results have not been displayed. A complete set of results can be found in Results Review.     Na 129  K 4.1  Mg 1.2  Phos 2.4  Alk Phos 1,184      Total bilirubin 12.7            Assessment and Plan:   Assessment: 59 year old female with recurrent ovarian cancer found to have jaundice elevated total bilirubin admitted for concern for biliary obstruction.     Problem List  1) Recurrent ovarian cancer  2) Biliary obstruction  3) Recurrent pleural effusions  4) Failure to thrive  5) Hyponatremia  6) Hypomagnesemia  7) Hypophosphatemia  8) Asymmetric RLE edema      FEN: NPO  Pain: Continue PTA MS Contin 15 mg BID, oxycodone prn    # Recurrent ovarian cancer with metastases to liver and brain  - s/p Craniotomy, tumor resection, Gamma knife  - Plan for outpatient follow up for further management    # Biliary obstruction: Biliary stent placed 8/23/2019 for chronic cholecystitis and biliary stricture. She had been on IV ceftriaxone for Klebsiella bacteremia of unknown source until 9/25/19 then started Augmentin which she has continued since that time. She was scheduled for replacement of stent in 11/15/19. No signs of infection at this time. Patient does have leukocytosis but this is stable since last CBC two weeks ago.    - GI consulted; Recommended Zosyn, NPO, and plan for ERCP, biliary stent exchange in AM. No imaging needed at this time.   - Start zosyn    # Recurrent pleural effusions  - Last thoracentesis 10/25. Oxygensaturation appropriate on RA. Continue to monitor pulmonary status. Can obtain CXR for evaluation of thoracentesis.    # Severe malnutrition, failure to thrive  # Hyponatremia  # Hypomagnesemia  # Hypophosphatemia  - Continue PTA marinol  - Nutrition consult  - Electrolyte replacement protocol    # RLE edema  - Doppler US to rule out DVT    # Hydronephrosis R kidney  - R ureteral stent in place  - Continue PTA flomax    PPX: Start lovenox ppx after GI  procedure  Dispo:  After resolution of symptoms and after GI procedure    Yesenia Salgado MD     Discussed with resident.    Giuliana Arroyo MD  Gynecologic Oncology  [facility]

## 2019-10-29 NOTE — DISCHARGE SUMMARY
Gynecologic Oncology Discharge Summary    Odalys Nathan  [id number]    Admit Date: 10/28/2019  Discharge Date: 10/31/2019  Admitting Provider: Giuliana Arroyo MD  Discharge Provider: Giuliana Arroyo MD    Admission Dx:   1) Recurrent ovarian cancer, progressing disease  2) Biliary obstruction/Cholangitis  3) Recurrent pleural effusions  4) Failure to thrive  5) Hyponatremia  6) Hypomagnesemia  7) Hypophosphatemia  8) Asymmetric RLE edema  9) Elevated LFTs and bili  10) Anemia of chronic disease  11) Severe Malnutrition    Discharge Dx:  1) Recurrent ovarian cancer, progressing disease  2) Biliary obstruction/Cholangitis  3) Recurrent pleural effusions  4) Failure to thrive  5) Hyponatremia, resolved  6) Hypomagnesemia, resolved  7) Hypophosphatemia, resolved  8) Asymmetric RLE edema  9) Elevated LFTs and bili  10) Anemia of chronic disease  11) Severe Malnutrition  Patient Active Problem List   Diagnosis     Partial small bowel obstruction (H)     Ascites     Metastatic cancer (H)     Acute blood loss anemia     Poor nutrition     Elevated liver enzymes     Pulmonary embolism (H)     Diarrhea     Subclinical hypothyroidism     S/P exploratory laparotomy     Drug-induced neutropenia (H)     Hypomagnesemia     Ovarian cancer, right (H)     Ovarian cancer, left (H)     Anemia, unspecified type     Patient in cancer related research study     Encounter for long-term (current) use of medications     Encounter for antineoplastic chemotherapy     Ovarian cancer (H)     Brain tumor (H)     Stricture or kinking of ureter     Neoplasm related pain     Failure to thrive syndrome, adult     Hyponatremia     Electrolyte abnormality     Pleural effusion     Hypokalemia     Hypophosphatemia     Jaundice       Procedures: ERCP, Thoracentesis     Prior to Admission Medications:  Medications Prior to Admission   Medication Sig Dispense Refill Last Dose     albuterol (PROAIR HFA/PROVENTIL HFA/VENTOLIN HFA) 108 (90 Base)  MCG/ACT inhaler Inhale 2 puffs into the lungs every 6 hours as needed for shortness of breath / dyspnea or wheezing 1 Inhaler 3 Taking     amoxicillin-clavulanate (AUGMENTIN) 875-125 MG tablet Take 1 tablet by mouth 2 times daily 60 tablet 1 Taking     Ascorbic Acid (VITAMIN C PO) Take 500 mg by mouth daily    Taking     Calcium Carbonate-Vitamin D (CALCIUM + D PO) Take 1 tablet by mouth daily.   Not Taking     clotrimazole 10 MG rell Take 1 Rell (10 mg) by mouth 5 times daily (Patient not taking: Reported on 10/28/2019) 70 Rell 0 Not Taking     cyanocobalamin (VITAMIN B12) 1000 MCG/ML injection Inject 1 mL (1,000 mcg) into the muscle every 30 days 1 mL 11 Taking     diphenoxylate-atropine (LOMOTIL) 2.5-0.025 MG per tablet Take 2 tablets by mouth 4 times daily as needed for diarrhea 60 tablet 0 Taking     dronabinol (MARINOL) 2.5 MG capsule Take 1 capsule (2.5 mg) by mouth 2 times daily (before meals) 60 capsule 3 Taking     Ferrous Sulfate 324 (65 Fe) MG TBEC TAKE ONE TABLET BY MOUTH THREE TIMES DAILY WITH MEALS. TAKE WITH A SMALL AMOUNT OF ORANGE JUICE. DO NOT TAKE WITH CALCIUM 90 tablet 11 Taking     HEMP OIL OR EXTRACT OR OTHER CBD CANNABINOID, NOT MEDICAL CANNABIS,    Taking     HERBALS daily    Taking     ipratropium - albuterol 0.5 mg/2.5 mg/3 mL (DUONEB) 0.5-2.5 (3) MG/3ML neb solution Take 1 vial (3 mLs) by nebulization every 6 hours as needed for wheezing or shortness of breath / dyspnea 3 Box 0 Taking     LEVOTHYROXINE SODIUM PO Take 50 mcg by mouth daily    Taking     loperamide (IMODIUM) 2 MG capsule Take 2 mg by mouth 4 times daily as needed for diarrhea   Taking     LORazepam (ATIVAN) 1 MG tablet Take 1 tablet (1 mg) by mouth every 6 hours as needed (Anxiety, Nausea/Vomiting or Sleep) 30 tablet 2 Taking     magnesium oxide (MAG-OX) 400 MG tablet Take 1 tablet (400 mg) by mouth 2 times daily 90 tablet 3 Taking     medical cannabis (Patient's own supply) (The purpose of this order is to document  that the patient reports taking medical cannabis.  This is not a prescription, and is not used to certify that the patient has a qualifying medical condition.) 0 Information only 0 Taking     morphine (MS CONTIN) 15 MG CR tablet Take 1 tablet (15 mg) by mouth every 12 hours 120 tablet 0 Taking     naloxone (NARCAN) 4 MG/0.1ML nasal spray Spray 1 spray (4 mg) into one nostril alternating nostrils once as needed for opioid reversal Every 2-3 minutes until patient responsive or EMS arrives 0.2 mL 0 Taking     ondansetron (ZOFRAN-ODT) 4 MG ODT tab Take 1-2 tablets (4-8 mg) by mouth every 6 hours as needed for nausea or vomiting (Patient not taking: Reported on 10/28/2019) 20 tablet 0 Not Taking     order for DME Injection Supplies for Vitamin B12: 3cc syringes w/ 27 gauge needles, 1/2 inch length 3 each 0 Unknown     oxyCODONE (ROXICODONE) 5 MG tablet Take 1 tablet (5 mg) by mouth every 6 hours as needed for severe pain 30 tablet 0 Taking     pantoprazole (PROTONIX) 40 MG EC tablet Take 40 mg by mouth daily as needed for heartburn   Taking     polyethylene glycol (MIRALAX/GLYCOLAX) packet Take 1 packet by mouth daily   Taking     potassium chloride (KLOR-CON) 20 MEQ packet Take 20 mEq by mouth daily   Taking     prochlorperazine (COMPAZINE) 10 MG tablet Take 1 tablet (10 mg) by mouth every 6 hours as needed for nausea or vomiting 30 tablet 1 Taking     tamsulosin (FLOMAX) 0.4 MG capsule Take 1 capsule (0.4 mg) by mouth daily For stent discomfort 30 capsule 11 Taking     VITAMIN E NATURAL PO Take 100 Units by mouth daily   Not Taking       Discharge Medications:   Odalys Nathan   Home Medication Instructions STEPH:[id number]    Printed on:10/31/19 1048   Medication Information                      albuterol (PROAIR HFA/PROVENTIL HFA/VENTOLIN HFA) 108 (90 Base) MCG/ACT inhaler  Inhale 2 puffs into the lungs every 6 hours as needed for shortness of breath / dyspnea or wheezing             Ascorbic Acid (VITAMIN C  PO)  Take 500 mg by mouth daily              Calcium Carbonate-Vitamin D (CALCIUM + D PO)  Take 1 tablet by mouth daily.             ciprofloxacin (CIPRO) 500 MG tablet  Take 1 tablet (500 mg) by mouth every 12 hours for 7 days             ciprofloxacin (CIPRO) 500 MG tablet  Take 1 tablet (500 mg) by mouth every 24 hours Follow up with ID on 11/22 for refill             cyanocobalamin (VITAMIN B12) 1000 MCG/ML injection  Inject 1 mL (1,000 mcg) into the muscle every 30 days             diphenoxylate-atropine (LOMOTIL) 2.5-0.025 MG per tablet  Take 2 tablets by mouth 4 times daily as needed for diarrhea             dronabinol (MARINOL) 2.5 MG capsule  Take 1 capsule (2.5 mg) by mouth 2 times daily (before meals)             Ferrous Sulfate 324 (65 Fe) MG TBEC  TAKE ONE TABLET BY MOUTH THREE TIMES DAILY WITH MEALS. TAKE WITH A SMALL AMOUNT OF ORANGE JUICE. DO NOT TAKE WITH CALCIUM             guaiFENesin (MUCINEX) 600 MG 12 hr tablet  Take 1 tablet (600 mg) by mouth 2 times daily             HEMP OIL OR EXTRACT OR OTHER CBD CANNABINOID, NOT MEDICAL CANNABIS,               HERBALS  daily              ipratropium - albuterol 0.5 mg/2.5 mg/3 mL (DUONEB) 0.5-2.5 (3) MG/3ML neb solution  Take 1 vial (3 mLs) by nebulization every 6 hours as needed for wheezing or shortness of breath / dyspnea             LEVOTHYROXINE SODIUM PO  Take 50 mcg by mouth daily              loperamide (IMODIUM) 2 MG capsule  Take 2 mg by mouth 4 times daily as needed for diarrhea             LORazepam (ATIVAN) 1 MG tablet  Take 1 tablet (1 mg) by mouth every 6 hours as needed (Anxiety, Nausea/Vomiting or Sleep)             magnesium oxide (MAG-OX) 400 MG tablet  Take 1 tablet (400 mg) by mouth 2 times daily             medical cannabis (Patient's own supply)  (The purpose of this order is to document that the patient reports taking medical cannabis.  This is not a prescription, and is not used to certify that the patient has a qualifying medical  condition.)             morphine (MS CONTIN) 15 MG CR tablet  Take 1 tablet (15 mg) by mouth every 12 hours             naloxone (NARCAN) 4 MG/0.1ML nasal spray  Spray 1 spray (4 mg) into one nostril alternating nostrils once as needed for opioid reversal Every 2-3 minutes until patient responsive or EMS arrives             ondansetron (ZOFRAN-ODT) 4 MG ODT tab  Take 1 tablet (4 mg) by mouth every 6 hours as needed for nausea or vomiting             order for DME  Injection Supplies for Vitamin B12: 3cc syringes w/ 27 gauge needles, 1/2 inch length             oxyCODONE (ROXICODONE) 5 MG tablet  Take 1 tablet (5 mg) by mouth every 6 hours as needed for severe pain             pantoprazole (PROTONIX) 40 MG EC tablet  Take 40 mg by mouth daily as needed for heartburn             polyethylene glycol (MIRALAX/GLYCOLAX) packet  Take 1 packet by mouth daily             potassium chloride (KLOR-CON) 20 MEQ packet  Take 20 mEq by mouth daily             prochlorperazine (COMPAZINE) 10 MG tablet  Take 1 tablet (10 mg) by mouth every 6 hours as needed for nausea or vomiting             tamsulosin (FLOMAX) 0.4 MG capsule  Take 1 capsule (0.4 mg) by mouth daily For stent discomfort             VITAMIN E NATURAL PO  Take 100 Units by mouth daily               Consultations:   GI, nurtrition    Brief History of Illness:  Odalys is a 59 year old woman with history of recurrent ovarian cancer with brain and liver metastases who presented to clinic with jaundice. Found to have elevated total bilirubin, transaminitis, and elevated Alkaline phosphatase. Admitted for biliary obstruction and cholangitis.    Hospital Course:  Dz:   -Recurrent ovarian cancer with severe poor nutrition, recurring right pleural effusion, brain mets, and worsening disease including liver metastasis. Discussed chemo vs. Hospice and she would like to think about it. Patient seen by palliative care. She will follow-up in clinic for a care plan.  FEN:   - She  was started on IVF on HD#1. She was noted to have hyponatremia, hypomagnesemia, and hypophosphatemia and was on an electrolyte replacement. NA, Mg, and PO4 improved at time of discharge. Nutrition was consulted due to history of severe malnutrition. Patient will attempt small frequent meals, continue appetite stimulant, and boost shakes. She is only tolerating small amounts of nutrition. Gyn Onc did not recommend TPN or TF in the setting of cholangitis and progression of disease considering transition to hospice.  Pain:   - She was continued on her home MS Contin and PRN oxycodone. Pain was well controlled.   CV:   - She has no history of CV issues.  Her vital signs were stable while in house and she had no acute CV issues.  PULM:   - Patient has a history of recurrent pulmonary effusions. Procedure team consulted for thoracentesis and drained 1L. She was encouraged to use her bedside IS while in house.   HEME:   - Hgb on admission 12.2 and dropped to 8.9 secondary to dilution. Hgb was stable at 9.1. Anemia of chronic disease.  GI:   - History of chronic cholecystitis and biliary stricture secondary to malignancy with biliary stent in place. Total bilirubin was 12.7 on admission, alkaline phosphatase 1,184, , . GI was consulted and recommended starting Zosyn. She underwent ERCP with GI on HD#2. 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 At time of discharge Total bilirubin was 6.3, alkaline phosphatase 817, ALT 63, AST 50. Progression of liver mets. Patient will follow up with GI in clinic.    :     - Patient has a history of hydronephrosis with right ureteral stent in place. She was continued on PTA tamsulosin.  She had no acute  issues while in house.  ID:   - The patient was AF during her hospitalization.  She received preoperative ertapenem and was continued on this for 48 hours. GI and ID recommend transitioning to cipro 500 mg BID until 11/7 then decreasing to 500 mg daily for prophylaxis. She will follow up with GI and ID in clinic and further management.     ENDO:   - No acute issues. Continued on home synthroid for hypothyroidism.  PSYCH/NEURO/MSK:   - She had right LE edema on admission. Doppler US negative for DVT.  PPX:    - She was given SCDs and IS during her hospital course.  She tolerated these prophylactic interventions without incident. Lovenox was held secondary to procedures.     Discharge Instructions and Follow up:  Ms. Odalys Nathan was discharged from the hospital with follow up for     Discharge Diet: Regular  Discharge Activity: Activity as tolerated  Discharge Follow up: Dr Lloyd's team to contact patient for appt. Dr Santana 11/22.     Discharge Disposition:  Discharged to home    Discharge Staff: Dr Cruz Lynch, CNP  10/31/2019 11:09 AM    Giuliana Arroyo MD  Gynecologic Oncology  [facility]

## 2019-10-29 NOTE — PLAN OF CARE
Pt admitted from clinic for concern of biliary obstruction, arrived to  around 1945. Tachycardic to 117 upon arrival and BP's have been soft 90's - low 100's systolic, otherwise vitals stable. Dr. Salgado aware of tachycardia and soft BP. Triggered sepsis protocol, lactate 1.2. Up with SBA. Minimal pain, taking scheduled MS contin. Denies nausea, appetite fair. NPO @ midnight. IV antibiotic currently infusing, then maintenance IVF to start @ 100 mL/hr. Pt will need Mg and Phos replacement. RLE edematous, ultrasound ordered by MD. Blanchable redness to R heel, legs elevated on pillow. Voided x1, urine icteric. Reports having a BM today. Pt's daughter staying the night. Continue to monitor and with POC.

## 2019-10-30 NOTE — CONSULTS
[facility] ID Service: Initial Consultation     Patient:  Odalys Nathan  Date of birth 1960  Medical record number [id number]  Consult Requested by: Gyn-oncology team         Assessment and Recommendations:   Problem List:  1) Acute cholangitis s/p ERCP and biliary stenting (10/29)  2) Ovarian cancer with brain and liver metastases  3) Bilateral pleural effusions s/p right thoracentesis 10/30    Recommendations:   - agree with ertapenem 1g IV while hospitalized and then starting ciprofloxacin PO 500mg BID near time of discharge   - continue ciprofloxacin PO 500mg BID through 11/7 as recommended by GI   - decrease dosing to ciprofloxacin PO 500mg daily on 11/8 for prophylaxis and continue   - discontinue amoxicillin/clavulanate outpatient medication   - ID follow-up scheduled on 11/22 with Dr. Santana    Discussion:  Odalys Nathan is a 59 year old female with recurrent ovarian cancer with brain and liver metastases who was admitted on 10/28/2019 with jaundice subsequently found to have cholangitis and ID consulted for assistance with antibiotics. Previously has had malignant biliary obstruction requiring stent placement and had been on suppressive Augmentin with Dr. Santana as an outpatient. 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 She will need to be treated initially for acute cholangitis (agree with GI recommendations for ertapenem followed by ciprofloxacin) and moving forward we feel suppressive antibiotics may help decrease frequency of hospitalizations with the recognition that if liver metastases cause biliary obstruction (anatomic process), suppressive antibiotics alone will not prevent recurrence of cholangitis as source control (ERCP / stenting) is necessary in this situation. When seen, the patient and her family were pondering how to further  approach her care as concept of hospice was introduced by primary team. At this time will leave recommendation to start prophylactic ciprofloxacin after completion of treatment course of antibiotics with follow-up scheduled on 11/22 with Dr. Santana to address further directions from Infectious Disease standpoint. While inpatient please reach out to our consult team with any questions and as outpatient can reach out to Dr. Santana.    Patient was discussed with attending physician, Dr. Sofia.  Recommendations were conveyed to the primary team via telephone.  ID will sign off, please contact if any further questions arise.    Sis Mendieta DO  Infectious Disease Fellow  [phone]         History of Present Illness:   Odalys Nathan is a 59 year old female with recurrent ovarian cancer with brain and liver metastases who was admitted on 10/28/2019 with jaundice subsequently found to have cholangitis and ID consulted for assistance with antibiotics. Previously has had biliary obstruction requiring stent placement and had been on suppressive Augmentin with Dr. Santana as an outpatient. She is now admitted with increasing jaundice and found to have elevated liver enzymes and worsened hepatic burden of metastatic disease concerning for cholangitis. 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      When seen, she notes feeling a little bit better today with some return of her appetite. She denies abdominal pain noting what primarily brought her in was increasing jaundice. No fevers or chills. Did have some nausea a couple days ago that has resolved. She has chronic diarrhea that is unchanged in appearance or frequency (~4x daily). She has had very poor appetite and general fatigue. Also with increasing lower extremity edema. She states was tolerating Augmentin well as an outpatient but her  family notes concern that it didn't work since they are back here in the hospital. Family also with concerns regarding acute liver injury that can be seen with Augmentin use. They are unsure whether they would wish to continue with suppressive antibiotics or not at this time.         Review of Systems:   Complete ROS obtained, pertinent positives and negatives as above.         Past Medical and Surgical History:   Ovarian cancer   - c/b pleural effusion  PE / DVT  Short gut syndrome  Hypothyroidism  History of exploratory laparotomy with hysterectomy, b/l salpingo-oophorectomy, tumor debulking (2012)  History of ureteral stent (10/2019)  History of biliary stent (8/2019)        Allergies:    No Known Allergies         Current Antimicrobials:   Ertapenem 1g daily 10/29 - present    Prior: pip/tazo 10/23 - 10/29       Family History:   Family history of lung cancer in mother         Social History:   Lives with   Former tobacco use  EtOH use  Medical cannabis         Physical Exam:   Ranges forvital signs:  Temp:  [95.5  F (35.3  C)-99.7  F (37.6  C)] 95.7  F (35.4  C)  Pulse:  [] 97  Heart Rate:  [] 86  Resp:  [12-24] 18  BP: ()/(41-56) 90/41  SpO2:  [92 %-99 %] 99 %    Exam:  GENERAL:  Chronically ill appearing, lying in bed in no acute distress.   ENT:  Head is normocephalic, atraumatic. Anterior oropharynx without ulcers.  EYES:  Eyes have icteric sclerae.    NECK:  Supple.  LUNGS:  Normal respiratory effort.   ABDOMEN:  Non-distended.   EXT: Lower extremities with 2+ edema.   SKIN:  Jaundiced.  Line is in place without any surrounding erythema.  NEUROLOGIC:  Grossly nonfocal.           Laboratory Data:   Reviewed.  Pertinent for: WBC 14.3 --> 11.4, Tbili 12.7 --> 10.5, Alk phos 1184 --> 883    Culture data:  BCx 10/29 ngtd           Imaging:   CT A/P 10/29  IMPRESSION: In this patient with history of metastatic ovarian cancer,  1. Increase in intrahepatic biliary ductal dilation, which  may be seen  in the setting of stent failure.  2. Overall worsening hepatic metastatic disease with increased size of  the ill-defined lesions in hepatic segment 6 and at the level of the  edmundo hepatis. There are additional hypodense infiltrative lesions  seen throughout the liver which are new from prior exam. Scalloping of  the hepatic contour suggestive of malignant ascites.  3. 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  4. Increased size of the bilateral pleural effusions, moderate to  large on the right and small on the left with associated atelectasis.  5. Resolution of the previously seen right hydronephrosis with  interval placement of right nephroureteral stent.  6. Redemonstration of the retroperitoneal and mesenteric  Lymphadenopathy.    Attending Physician Attestation:  I have seen and evaluated the patient. I have discussed with the ID fellow, and I agree with the above documented findings and plan in Dr. Mendieta's note. I have reviewed today's vital signs, medications, labs and imaging.    Floor time: 15 minutes  Face-to-face time: 10 minutes  Total time: 25 minutes     Bacilio Sofia MD MPH  Professor  Division of Infectious Diseases & International Medicine  Department of Medicine

## 2019-10-30 NOTE — PLAN OF CARE
Hypotensive with BP around 90/50, otherwise AVSS. Pt reports some SOB at rest and GROVES. Reports improved breathing after beside thoracentesis today. Continues to be jaundice, alert and oriented x4. Denies pain, on scheduled MS contin and marinol. Denies nausea, on a regular diet and arline counts. Passing flatus and had a BM in the last day. Voiding spontaneously but forgetting to save, educated on importance of I/O's. Starting on IV Invanz this afternoon. Coccyx with blanchable redness. Up with SBA. Walking in halls with . Continue to monitor BP, respiratory status, liver function and s/s of infection.

## 2019-10-30 NOTE — PLAN OF CARE
Returned from PACU 1645. Ambulated from stretcher to bed. BPs soft and tachycardic 110s (MD aware). OVSS on 1L NC. Capno WNL. GROVES/SOB, MD team aware, given prn neb with some improvement. Denies pain, well controlled with scheduled MS contin. Denies nausea. Regular diet with fair intake. Up with SBA, steady on feet. Voiding adequate amounts, had BM this shift. Given protonix and tums for heartburn. Daughter at bedside.     Continue POC.

## 2019-10-30 NOTE — PROVIDER NOTIFICATION
Notified Gyn/Onc crossover 9299 of BP 82/43. Pt asymptomatic and voiding adequately, was given bolus last evening. Callback - no new orders. Will continue to monitor.

## 2019-10-30 NOTE — PLAN OF CARE
POD 1 biliary stent exchange/placement. Hypotensive; 80's/40's - MD aware. AOVSS on 1L O2. Capno on and WNL. Denies pain and need for intervention, takes scheduled MS Contin. Tolerating small amounts of regular diet with no nausea. BLE edema +2. Voiding adequately, last BM 10/29. PIV infusing IVMF @ 100/hr. Up with SBA. Daughter at bedside. Continue to monitor.

## 2019-10-30 NOTE — PROCEDURES
Procedure Note    Attending: Erick Armenta  Resident: Naeem Vang  Procedure: Right Thoracentesis  Indication: dyspnea  Risk Assessment: standard      The risks and benefits of the procedure were explained to the patient who expressed understanding and opted to proceed.  Consent was obtained and placed in the chart and the patient was placed on pulse oximetry.  A time out was performed.    An ultrasound probe was used to identify an area of pleural fluid in the right hemithorax.  This area was prepped and draped in the usual sterile fashion.  4 ml of 1% lidocaine was instilled and fluid located using ultrasound guidance.  A Dajie catheter was attempted to be placed in the space but could not be passed.  Subsequently, a small incision was  made with an 11 blade.  The 8 Tamazight thoracentesis catheter and needle were inserted above the rib until fluid obtained then the needle removed.    Using a one-way valve, 1000 ml of yellow colored fluid was removed. A specimen was not sent for analysis.    The catheter was removed with the patient exhaling and an occlusive dressing placed.       A chest radiograph is pending.    The tolerated the procedure well.  Please contact the Consult and Procedure Service if any concerns or complications arise.       Erick Armenta MD  [phone]    DOS:  10/30/19

## 2019-10-30 NOTE — PROGRESS NOTES
GASTROENTEROLOGY PROGRESS NOTE     Date of Admission: 10/28/2019  Reason for Admission: jaundice      ASSESSMENT:  59 year old female with a history of metastatic recurrent ovarian cancer with liver and brain mets who is admitted with progressive jaundice and elevated liver tests in a cholestatic pattern. Recent hospitalization with Klebsiella bacteremia, likely biliary source - has been on antibiotic course (Augmentin) until planned repeat ERCP on 11/15. CT scan abd/pelv with IV contrast obtained this morning showing increase in intrahepatic biliary duct dilation and overall worsening hepatic metastatic disease with increased size of lesion in segment 6 as well as additional hypodense infiltrative lesions throughout the liver, new from prior exam.     S/p ERCP 10/29 with evidence of cholangitis, the left common duct stent was removed, and a malignant stenosis of the bifurcation and common duct with impressive intrahepatic dilation noted. Additional plastic stents placed:   -8.5Fx22 (no flaps) into L hepatic   -8.5Fx22 (no flaps) in R hepatic   -7Fx12 with single ext pigtail and internal flap into L primary at the bifurcation.     Patient feeling somewhat improved this AM. LFT slightly down trending.    RECOMMENDATIONS:  ADAT, dietitian following - would likely benefit from supplements  Trend LFT  Please given another dose of IV Ertapenem today, followed by a course of oral ciprofloxacin for another 8 days (ID consulted)  Analgesia/antiemetics per primary team  Discussed with primary gyn/onc team    GI follow up: Repeat ERCP timing TBD    The patient was discussed and plan agreed upon with GI staff, Dr Lloyd.      Lizzy Shannon PA-C  [facility]  [facility]  Pager *[phone]  _______________________________________________________________      Subjective: Nursing notes and 24hr events reviewed. Patient seen and examined at 1000. Patient reports that she  "is feeling ok today. Just had thoracentesis prior to my arrival. Denies abdominal pain or distension. No fevers overnight.    ROS:   4 pt ROS negative unless noted in subjective.     Objective:  Blood pressure 90/41, pulse 97, temperature 95.7  F (35.4  C), temperature source Oral, resp. rate 18, height 1.651 m (5' 5\"), weight 58.9 kg (129 lb 14.4 oz), SpO2 99 %, not currently breastfeeding.  Gen: Sitting in bed. Appears comfortable  HEENT: NCAT. Conjunctiva clear. Sclera +icteric  CV: RRR, Peripheral perfusion intact  Resp: normal work of breathing  Abd: Soft, NT, ND, +BS  Msk: no gross deformity  Skin:  ++ jaundice  Ext: warm, well perfused   Neuro: grossly normal  Mental status/Psych: A&O. Asks/answers questions appropriately       Date 10/30/19 0700 - 10/31/19 0659   Shift 4656-8574 8602-2624 2474-4149 24 Hour Total   INTAKE   Shift Total(mL/kg)       OUTPUT   Urine 100   100   Shift Total(mL/kg) 100(1.7)   100(1.7)   Weight (kg) 58.92 58.92 58.92 58.92         PROCEDURES:  ERCP 10/29  - Well positioned transpapillary gallbladder stent left                        in situ and not manipulated                        - Well positioned left primary stent removed from a                        widely patent biliary sphincterotomy                        - Synchronous malignant stenoses of the bifurcation and                        common duct with impressive intrahepatic (left more so                        than right) dilation                        - Evidence of cholangitis in the form of pus                        - Successful stent placement deep to the right and left                        intrahepatics with excellent drainage achieved     LABS:  BMP  Recent Labs   Lab 10/30/19  0525 10/29/19  0539 10/28/19  2220 10/28/19  1757 10/26/19  0704   * 130*  --  129* 131*   POTASSIUM 3.6 3.7  --  4.1 4.1   CHLORIDE 102 97  --  96 99   JOSE ALBERTO 7.9* 7.8*  --  9.1 8.1*   CO2 22 25  --  25 25   BUN 7 5*  --  5* 11   CR " 0.57 0.53 0.56 0.56 0.60   * 121*  --  113* 110*     CBC  Recent Labs   Lab 10/30/19  0525 10/29/19  0539 10/28/19  2220 10/28/19  1757 10/25/19  0730   WBC 11.4* 11.7*  --  14.3* 15.1*   RBC 3.07* 3.32*  --  4.11 3.31*   HGB 8.9* 9.7*  --  12.2 9.7*   HCT 28.8* 29.0*  --  35.9 29.3*   MCV 94 87  --  87 89   MCH 29.0 29.2  --  29.7 29.3   MCHC 30.9* 33.4  --  34.0 33.1   RDW 18.2* 16.8*  --  17.1* 16.3*    314 345 418 290     INR  Recent Labs   Lab 10/28/19  2220   INR 1.49*     LFTs  Recent Labs   Lab 10/30/19  0525 10/29/19  0539 10/28/19  1757   ALKPHOS 883* 990* 1,184*   AST 67* 85* 107*   ALT 76* 82* 106*   BILITOTAL 10.5* 10.6* 12.7*   PROTTOTAL 5.1* 5.3* 6.6*   ALBUMIN 1.1* 1.2* 1.6*      PANCNo lab results found in last 7 days.      IMAGING:  EXAMINATION: CT ABDOMEN PELVIS W CONTRAST, 10/29/2019 8:47 AM     TECHNIQUE:  Helical CT images from the lung bases through the  symphysis pubis were obtained with IV contrast. Contrast dose:  iopamidol (ISOVUE-370) solution 77 mL     COMPARISON: Same-day radiograph and CT 9/9/2019     HISTORY: Abd distension; jaundice, concern for biliary obstruction,  possible need for biliary stent replacement.     FINDINGS:     Abdomen and pelvis: Stable position of the 2 biliary stents with the  distal tip of the stents in the second segment of the duodenum. There  is been interval worsening of the intrahepatic biliary ductal dilation  with dilated left bile ducts measuring up to 1.0 cm, previously  measuring 4 mm in maximal diameter. Decreased pneumobilia in  comparison with prior exam. There is a small focus of air in the  extrahepatic common bile duct. The gallbladder is persistently dilated  with one of the biliary stents in the gallbladder fundus. The common  bile duct and pancreatic duct are normal in caliber.     Increased size of the ill-defined, heterogeneously enhancing mass in  hepatic segment 6 now measuring up to 6.6 x 4.9 cm, previously 4.3 x  4.2 cm.  The infiltrative, hypodense, heterogeneous mass at the level  of the edmundo hepatis there is poorly defined however appears increased  in size (series 3 image 58). There are new hypodense infiltrative  opacities throughout the hepatic parenchyma suggestive of worsening  metastatic liver disease. Scalloping of the hepatic contours, which  can be seen with malignant ascites.     No significant change in the occlusion of the extrahepatic portal vein  from the infiltrative mass at the level of the edmundo hepatis. The  right and left portal veins are patent. The SMV/main portal vein  confluence is patent. The superior mesenteric vein and splenic veins  are patent. Numerous portosystemic collaterals are again demonstrated.     The spleen, pancreas and right adrenal gland are within normal limits.  Small splenule. Persistent unchanged thickening of the left adrenal  gland.     Symmetric renal cortical enhancement bilaterally. Interval placement  of a right nephroureteral stent with resolution of the previously seen  right hydronephrosis. No suspicious renal mass. No hydronephrosis or  nephrolithiasis. The urinary bladder is partly distended without focal  abnormality.     Postsurgical changes of partial left hemicolectomy with primary  colonic anastomosis. There is no significant change in the multiple  mildly dilated loops of small bowel with bowel wall thickening and  mucosal enhancement in the inferior central abdomen. No definite  transition point is identified.     Increased ascites seen in all abdominal quadrants. Diffuse mesenteric  edema. Innumerable enlarged mesenteric and retroperitoneal lymph nodes  increased from prior.     The abdominal aorta and its branch vessels are normal in caliber and  patent. Unchanged left common iliac vein stent. No abdominal aortic  aneurysm.     Lung bases: Increased size of bilateral pleural effusions, moderate to  large on the right and small on the left. Associated  compressive  atelectasis is seen bilaterally.  The heart is normal in size. No  pericardial effusion.     Bones and soft tissues: Mild degenerative changes. No suspicious  osseous lesion.                                                                      IMPRESSION: In this patient with history of metastatic ovarian cancer,  1. Increase in intrahepatic biliary ductal dilation, which may be seen  in the setting of stent failure.  2. Overall worsening hepatic metastatic disease with increased size of  the ill-defined lesions in hepatic segment 6 and at the level of the  edmundo hepatis. There are additional hypodense infiltrative lesions  seen throughout the liver which are new from prior exam. Scalloping of  the hepatic contour suggestive of malignant ascites.  3. 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  4. Increased size of the bilateral pleural effusions, moderate to  large on the right and small on the left with associated atelectasis.  5. Resolution of the previously seen right hydronephrosis with  interval placement of right nephroureteral stent.  6. Redemonstration of the retroperitoneal and mesenteric  lymphadenopathy.

## 2019-10-30 NOTE — PROGRESS NOTES
Gynecology Oncology Progress Note  10/30/19    HD# 3 Biliary obstruction    Disease: Recurrent ovarian cancer with liver and brain metastases      24 hour events:   - CT A/P: intrahepatic biliary duct dilation and overall worsening hepatic metastatic disease and new liver lesions. Moderate bilateral pleural effusions  - ERCP, biliary stent exchange; evidence of cholangitis (pus)  - Ertapenem started  - Blood cultures collected  - CXR: Increased right pleural effusion with associated atelectasis vs consolidation. Stable left pleural effusion  - Requiring supplemental O2    Subjective:Feeling better after surgery yesterday. Still no abdominal pain or distension. Tolerated a muffin and coffee for dinner last night. Passing flatus and BM. Feeling like breathing is still difficult. Uses the NC O2 for comfort.     Objective:   Vitals:    10/29/19 1600 10/29/19 1644 10/29/19 1715 10/29/19 1745   BP: 100/56 96/54 106/56 99/52   BP Location:  Right arm Right arm Right arm   Pulse: 112      Resp: 24 22 22 18   Temp:  97.5  F (36.4  C)     TempSrc:  Oral     SpO2: 95% 93% 95% 94%   Weight:       Height:           General: NAD, appears comfortable in bed  CV: RRR, no m/r/g  Resp: Decreased breath sounds in R lung base. No  Wheezes. On 1 L O2   Abdomen: soft, nontender, nondistended  Extremities: warm, well-perfused, nontender, R>L edema, SCDs in place  Skin: jaundice, sclera icteric      24 hour: IN - 2350 ml  PO // OUT - 800 ml UOP, x3 ml stool  Since MN: IN - NR ml IVF // OUT - 200 ml UOP, 0 ml stool      New labs/imaging-  WBC 11.4  Hg 8.9    Na 132  K 3.6  Cr 0.57    tbili 10.5     ALT 76  AST 67    CT A/P:  IMPRESSION: In this patient with history of metastatic ovarian cancer,  1. Increase in intrahepatic biliary ductal dilation, which may be seen  in the setting of stent failure.  2. Overall worsening hepatic metastatic disease with increased size of  the ill-defined lesions in hepatic segment 6 and at the  level of the  edmundo hepatis. There are additional hypodense infiltrative lesions  seen throughout the liver which are new from prior exam. Scalloping of  the hepatic contour suggestive of malignant ascites.  3. 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  4. Increased size of the bilateral pleural effusions, moderate to  large on the right and small on the left with associated atelectasis.  5. Resolution of the previously seen right hydronephrosis with  interval placement of right nephroureteral stent.  6. Redemonstration of the retroperitoneal and mesenteric  Lymphadenopathy.    CXR:  IMPRESSION:   1. Increased right pleural effusion and associated atelectasis versus  consolidation.  2. Stable left pleural effusion with associated atelectasis.  3. Decreased retrocardiac opacities.    Assessment: 59 year old female with recurrent ovarian cancer found to have jaundice elevated total bilirubin admitted for concern for biliary obstruction. Now s/p ERCP and stent exchange. Antibiotics due to evidence of cholangitis visualized during procedure.      FEN: Regular diet, NS+ KClo 20 mEq @ 100 ml/hr  Pain: Continue PTA MS Contin 15 mg BID, oxycodone prn     # Recurrent ovarian cancer with metastases to liver and brain  - s/p Craniotomy, tumor resection, Gamma knife  - Plan for outpatient follow up for further management     # Biliary obstruction: Biliary stent placed 8/23/2019 for chronic cholecystitis and biliary stricture. Now s/p ERCP stent exchange due to biliary obstruction (10/29) with evidence of cholangitis seen during procedure.    - S/p 1D zosyn. D#2/2 ertapenem. Transition to PO ciprofloxacin for 8 day course after ertapenem.  - Consult ID, for antibiotic recs as she had been on augmentin for long time due to Klebsiella bacteremia      # Recurrent pleural effusions  - Last thoracentesis  10/25. Patient required supplemental O2 after procedure yesterday but mostly for comfort. CXR and CT both demonstrating increased right pleural effusion.   - Consult Medicine Procedure team today for thoracentesis     # Severe malnutrition, failure to thrive  # Hyponatremia  # Hypomagnesemia  # Hypophosphatemia  - Continue PTA marinol  - Nutrition consult  - Electrolyte replacement protocol     # RLE edema  - Doppler US: no DVT     # Hydronephrosis R kidney  - R ureteral stent in place. Resolution of hydronephrosis seen on CT (10/29)  - Continue PTA flomax      Yesenia Salgado MD  Obstetrics & Gynecology, PGY-2  10/30/2019 5:52 AM    Nga Yeung MD    Department of Ob/Gyn and Women's Health  Division of Gynecologic Oncology  [facility]  [phone]    I saw and evaluated the patient with the fellow. I edited and reviewed the above note.  Spent >30 minutes with patient and , discussed current situation of poor nutrition, recurring right pleural effusion, worsening disease including brain metastasis. Patient and  aware of status. T bili down since ERCP, evidence of cholangitis, ID seeing her. On Ertapenem now. Plan po antibiotics afterward. Discussed chemo vs. Hospice. Patient aware of poor nutrition with Alb=1.1 and inability to be able to receive any chemotherapy for some time in light of infection. Patient and family weighing their options, PC seeing patient. Nutrition consult.

## 2019-10-31 NOTE — PLAN OF CARE
Discharge  D: Orders for discharge and outpatient medications written.   I: Home medications and return to clinic schedule reviewed with patient and spouse. Discharge instructions and parameters for calling Health Care Provider reviewed with teach back. Patient to leave once transport is available, accompanied by her spouse.   A: Patient/family verbalized understanding and are ready for discharge.   P: Patient instructed to  medications in Pharmacy. Follow up as scheduled.

## 2019-10-31 NOTE — PLAN OF CARE
Discharge Planner PT   Patient plan for discharge: Home with family assist  Current status: Pt amb ~ 125 feet x 2 IND. Pt amb up and down 4 stairs x 2 trials with SBA. Pt IND with sit->stand trials.   Barriers to return to prior living situation: medical status, respiratory status  Recommendations for discharge: Home with family assist  Rationale for recommendations: Pt is IND and safe with basic mobility, has assistance of family at home. Pt is not currently open to home care, but would consider home care in the future if needed.        Entered by: Maria Teresa Soria 10/31/2019 11:38 AM

## 2019-10-31 NOTE — PLAN OF CARE
VSS; bp trending low. Pt denies pain and nausea. Pt is ambulating with standby assist. Reports improved SOB after thoracentesis. Order approved for patient request of Boost+ nutritional supplement. Blanchable redness present at coccyx. Mepilex dressing applied. Voiding spontaneously. Continue to remind patient to save urine for I/Os.

## 2019-10-31 NOTE — PROGRESS NOTES
GASTROENTEROLOGY PROGRESS NOTE     Date of Admission: 10/28/2019  Reason for Admission: jaundice      ASSESSMENT:  59 year old female with a history of metastatic recurrent ovarian cancer with liver and brain mets who is admitted with progressive jaundice and elevated liver tests in a cholestatic pattern. Recent hospitalization with Klebsiella bacteremia, likely biliary source - has been on antibiotic course (Augmentin) until planned repeat ERCP on 11/15. CT scan abd/pelv with IV contrast obtained on admission showing increase in intrahepatic biliary duct dilation and overall worsening hepatic metastatic disease with increased size of lesion in segment 6 as well as additional hypodense infiltrative lesions throughout the liver, new from prior exam.     S/p ERCP 10/29 with evidence of cholangitis, the left common duct stent was removed, and a malignant stenosis of the bifurcation and common duct with impressive intrahepatic dilation noted. Additional plastic stents placed:   -8.5Fx22 (no flaps) into L hepatic   -8.5Fx22 (no flaps) in R hepatic   -7Fx12 with single ext pigtail and internal flap into L primary at the bifurcation.     Patient continues to feel improved this AM. LFT down trending.    RECOMMENDATIONS:  ADAT, dietitian following - would likely benefit from supplements  Trend LFT  Continue course of Ciprofloxacin as outpatient  Analgesia/antiemetics per primary team  Discussed with primary gyn/onc team    GI follow up: Repeat ERCP as scheduled 11/15    The patient was discussed and plan agreed upon with GI staff, Dr Lloyd.      Lizzy Shannon PA-C  Advanced Endoscopy/Pancreaticobiliary [facility]  [facility]  Pager *[phone]  _______________________________________________________________      Subjective: Nursing notes and 24hr events reviewed. Patient seen and examined at 0930.  at bedside. Reports that overall she feels improved today. Tolerating diet. Still feels  "somewhat short of breath. No nausea, vomiting or fevers.    ROS:   4 pt ROS negative unless noted in subjective.     Objective:  Blood pressure 92/60, pulse 98, temperature 96.9  F (36.1  C), temperature source Oral, resp. rate 16, height 1.651 m (5' 5\"), weight 58.9 kg (129 lb 14.4 oz), SpO2 97 %, not currently breastfeeding.  Gen: Sitting in bed. Appears comfortable  HEENT: NCAT. Conjunctiva clear. Sclera +icteric  CV: RRR, Peripheral perfusion intact  Resp: normal work of breathing  Abd: Soft, NT, ND, +BS  Msk: no gross deformity  Skin:  less jaundice  Ext: warm, well perfused   Neuro: grossly normal  Mental status/Psych: A&O. Asks/answers questions appropriately       Date 10/30/19 0700 - 10/31/19 0659   Shift 0520-1192 8252-8765 0548-7050 24 Hour Total   INTAKE   Shift Total(mL/kg)       OUTPUT   Urine 100   100   Shift Total(mL/kg) 100(1.7)   100(1.7)   Weight (kg) 58.92 58.92 58.92 58.92         PROCEDURES:  ERCP 10/29  - Well positioned transpapillary gallbladder stent left                        in situ and not manipulated                        - Well positioned left primary stent removed from a                        widely patent biliary sphincterotomy                        - Synchronous malignant stenoses of the bifurcation and                        common duct with impressive intrahepatic (left more so                        than right) dilation                        - Evidence of cholangitis in the form of pus                        - Successful stent placement deep to the right and left                        intrahepatics with excellent drainage achieved     LABS:  [facility]  Recent Labs   Lab 10/31/19  0748 10/30/19  0525 10/29/19  0539 10/28/19  2220 10/28/19  1757    132* 130*  --  129*   POTASSIUM 4.0 3.6 3.7  --  4.1   CHLORIDE 107 102 97  --  96   JOSE ALBERTO 7.8* 7.9* 7.8*  --  9.1   CO2 23 22 25  --  25   BUN 8 7 5*  --  5*   CR 0.48* 0.57 0.53 0.56 0.56   * 108* 121*  --  113* "     CBC  Recent Labs   Lab 10/31/19  0646 10/30/19  0525 10/29/19  0539 10/28/19  2220 10/28/19  1757   WBC 11.7* 11.4* 11.7*  --  14.3*   RBC 3.13* 3.07* 3.32*  --  4.11   HGB 9.1* 8.9* 9.7*  --  12.2   HCT 27.9* 28.8* 29.0*  --  35.9   MCV 89 94 87  --  87   MCH 29.1 29.0 29.2  --  29.7   MCHC 32.6 30.9* 33.4  --  34.0   RDW 18.5* 18.2* 16.8*  --  17.1*    262 314 345 418     INR  Recent Labs   Lab 10/28/19  2220   INR 1.49*     LFTs  Recent Labs   Lab 10/31/19  0748 10/30/19  0525 10/29/19  0539 10/28/19  1757   ALKPHOS 817* 883* 990* 1,184*   AST 50* 67* 85* 107*   ALT 63* 76* 82* 106*   BILITOTAL 6.3* 10.5* 10.6* 12.7*   PROTTOTAL 5.3* 5.1* 5.3* 6.6*   ALBUMIN 1.2* 1.1* 1.2* 1.6*      PANCNo lab results found in last 7 days.      IMAGING:  EXAMINATION: CT ABDOMEN PELVIS W CONTRAST, 10/29/2019 8:47 AM     TECHNIQUE:  Helical CT images from the lung bases through the  symphysis pubis were obtained with IV contrast. Contrast dose:  iopamidol (ISOVUE-370) solution 77 mL     COMPARISON: Same-day radiograph and CT 9/9/2019     HISTORY: Abd distension; jaundice, concern for biliary obstruction,  possible need for biliary stent replacement.     FINDINGS:     Abdomen and pelvis: Stable position of the 2 biliary stents with the  distal tip of the stents in the second segment of the duodenum. There  is been interval worsening of the intrahepatic biliary ductal dilation  with dilated left bile ducts measuring up to 1.0 cm, previously  measuring 4 mm in maximal diameter. Decreased pneumobilia in  comparison with prior exam. There is a small focus of air in the  extrahepatic common bile duct. The gallbladder is persistently dilated  with one of the biliary stents in the gallbladder fundus. The common  bile duct and pancreatic duct are normal in caliber.     Increased size of the ill-defined, heterogeneously enhancing mass in  hepatic segment 6 now measuring up to 6.6 x 4.9 cm, previously 4.3 x  4.2 cm. The  infiltrative, hypodense, heterogeneous mass at the level  of the edmundo hepatis there is poorly defined however appears increased  in size (series 3 image 58). There are new hypodense infiltrative  opacities throughout the hepatic parenchyma suggestive of worsening  metastatic liver disease. Scalloping of the hepatic contours, which  can be seen with malignant ascites.     No significant change in the occlusion of the extrahepatic portal vein  from the infiltrative mass at the level of the edmundo hepatis. The  right and left portal veins are patent. The SMV/main portal vein  confluence is patent. The superior mesenteric vein and splenic veins  are patent. Numerous portosystemic collaterals are again demonstrated.     The spleen, pancreas and right adrenal gland are within normal limits.  Small splenule. Persistent unchanged thickening of the left adrenal  gland.     Symmetric renal cortical enhancement bilaterally. Interval placement  of a right nephroureteral stent with resolution of the previously seen  right hydronephrosis. No suspicious renal mass. No hydronephrosis or  nephrolithiasis. The urinary bladder is partly distended without focal  abnormality.     Postsurgical changes of partial left hemicolectomy with primary  colonic anastomosis. There is no significant change in the multiple  mildly dilated loops of small bowel with bowel wall thickening and  mucosal enhancement in the inferior central abdomen. No definite  transition point is identified.     Increased ascites seen in all abdominal quadrants. Diffuse mesenteric  edema. Innumerable enlarged mesenteric and retroperitoneal lymph nodes  increased from prior.     The abdominal aorta and its branch vessels are normal in caliber and  patent. Unchanged left common iliac vein stent. No abdominal aortic  aneurysm.     Lung bases: Increased size of bilateral pleural effusions, moderate to  large on the right and small on the left. Associated  compressive  atelectasis is seen bilaterally.  The heart is normal in size. No  pericardial effusion.     Bones and soft tissues: Mild degenerative changes. No suspicious  osseous lesion.                                                                      IMPRESSION: In this patient with history of metastatic ovarian cancer,  1. Increase in intrahepatic biliary ductal dilation, which may be seen  in the setting of stent failure.  2. Overall worsening hepatic metastatic disease with increased size of  the ill-defined lesions in hepatic segment 6 and at the level of the  edmundo hepatis. There are additional hypodense infiltrative lesions  seen throughout the liver which are new from prior exam. Scalloping of  the hepatic contour suggestive of malignant ascites.  3. 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  4. Increased size of the bilateral pleural effusions, moderate to  large on the right and small on the left with associated atelectasis.  5. Resolution of the previously seen right hydronephrosis with  interval placement of right nephroureteral stent.  6. Redemonstration of the retroperitoneal and mesenteric  lymphadenopathy.

## 2019-10-31 NOTE — PROGRESS NOTES
10/31/19 1108   Quick Adds   Type of Visit Initial PT Evaluation   Living Environment   Lives With spouse   Living Arrangements house   Home Accessibility stairs to enter home;stairs within home   Number of Stairs, Main Entrance 2   Number of Stairs, Within Home, Primary   (8 + 8)   Stair Railings, Within Home, Primary railing on right side (ascending)   Transportation Anticipated family or friend will provide   Living Environment Comment Pt reports she has 2 stairs to enter, then a landing, then has split entry with 8 stairs up to living room, then another 8 stairs to bedroom and bathroom.    Self-Care   Usual Activity Tolerance moderate   Current Activity Tolerance fair   Regular Exercise No   Equipment Currently Used at Home none   Functional Level Prior   Ambulation 0-->independent   Transferring 0-->independent   Toileting 0-->independent   Bathing 0-->independent   Communication 0-->understands/communicates without difficulty   Swallowing 0-->swallows foods/liquids without difficulty   Cognition 0 - no cognition issues reported   Fall history within last six months no   Which of the above functional risks had a recent onset or change? none   Prior Functional Level Comment Pt reports she is IND with all ADLs, IADLs, but notes that she needs additional time to complete tasks like dressing.    General Information   Onset of Illness/Injury or Date of Surgery - Date 10/28/19   Referring Physician Beth Lynch APRN CNP   Pertinent History of Current Problem (include personal factors and/or comorbidities that impact the POC) Pt is a 59 year old female with recurrent ovarian cancer who presents with jaundice   Precautions/Limitations fall precautions   Cognitive Status Examination   Orientation orientation to person, place and time   Level of Consciousness alert   Follows Commands and Answers Questions 100% of the time   Personal Safety and Judgment intact   Memory intact   Pain Assessment   Patient  "Currently in Pain No   Integumentary/Edema   Integumentary/Edema no deficits were identifed   Posture    Posture Forward head position   Range of Motion (ROM)   ROM Quick Adds No deficits were identified   Strength   Strength Comments BLE 4/5 throughout   Bed Mobility   Bed Mobility Comments Pt tx supine->sit with Min A.    Transfer Skills   Transfer Comments Pt tx sit->stand IND.    Gait   Gait Comments Pt amb ~ 125 feet x 2 with SBA.    Balance   Balance other (describe)   Balance Comments Minimal path deviation noted    Sensory Examination   Sensory Perception no deficits were identified   General Therapy Interventions   Planned Therapy Interventions bed mobility training;strengthening;transfer training;gait training   Clinical Impression   Criteria for Skilled Therapeutic Intervention yes, treatment indicated   PT Diagnosis Activity Intolerance   Influenced by the following impairments activity intolerance, decreased strength, impaired balance   Functional limitations due to impairments bed mob, transfers, gait, stairs   Clinical Presentation Stable/Uncomplicated   Clinical Presentation Rationale PMHx, clinical judgement, current presentation   Clinical Decision Making (Complexity) Low complexity   Therapy Frequency Daily   Predicted Duration of Therapy Intervention (days/wks) 11/7/19   Anticipated Discharge Disposition Home with Assist   Risk & Benefits of therapy have been explained Yes   Patient, Family & other staff in agreement with plan of care Yes   [facility] [employer] TM \"6 Clicks\"   2016, Trustees of [facility], under license to [employer].  All rights reserved.   6 Clicks Short Forms Basic Mobility Inpatient Short Form   [facility] [employer]  \"6 Clicks\" V.2 Basic Mobility Inpatient Short Form   1. Turning from your back to your side while in a flat bed without using bedrails? 3 - A Little   2. Moving from lying on your back to sitting on the side of a flat bed without using bedrails? " 3 - A Little   3. Moving to and from a bed to a chair (including a wheelchair)? 4 - None   4. Standing up from a chair using your arms (e.g., wheelchair, or bedside chair)? 4 - None   5. To walk in hospital room? 4 - None   Total Evaluation Time   Total Evaluation Time (Minutes) 10

## 2019-10-31 NOTE — PROGRESS NOTES
Gynecology Oncology Progress Note  10/31/19    HD# 4 Biliary obstruction    Disease: Recurrent ovarian cancer with liver and brain metastases    24 hour events:   - ID consulted, continue regimen  - Thoracentesis, 1 L  - Start calorie counts    Subjective:Feeling okay. Improved breathing but needed a nebulizer overnight. Has been trying to eat without much success. Denies abdominal pain, n/v, chest pain. Thinking about her next steps in her care.     Objective:   Vitals:    10/30/19 1324 10/30/19 1523 10/30/19 1920 10/31/19 0025   BP:  96/47 97/57 99/55   BP Location:  Right arm Right arm Right arm   Pulse:   92 96   Resp:  20 18 16   Temp:  96.2  F (35.7  C) 96.7  F (35.9  C) 97  F (36.1  C)   TempSrc:  Oral Oral Oral   SpO2:  98% 98% 96%   Weight: 58.9 kg (129 lb 14.4 oz)      Height:           General: NAD, appears comfortable in bed  CV: RRR, no m/r/g  Resp: Decreased breath sounds in R lung base. No  Wheezes. On 1 L O2   Abdomen: soft, nontender, nondistended  Extremities: warm, well-perfused, nontender, R>L edema, SCDs in place  Skin: jaundice, sclera icteric      24 hour: IN - 900 ml IVF // OUT - 375 + 3x NR ml UOP, x1 ml stool   Since MN: IN - NR ml IVF // OUT - 110 ml UOP, 0 ml stool      New labs/imaging- pending CBC, CMP      Assessment: 59 year old female with recurrent ovarian cancer found to have jaundice elevated total bilirubin admitted for concern for biliary obstruction. Now s/p ERCP and stent exchange. Antibiotics due to evidence of cholangitis visualized during procedure.      FEN: Regular diet, NS+ KClo 20 mEq @ 100 ml/hr  Pain: Continue PTA MS Contin 15 mg BID, oxycodone prn     # Recurrent ovarian cancer with metastases to liver and brain  - s/p Craniotomy, tumor resection, Gamma knife  - Plan for outpatient follow up for further management     # Biliary obstruction: Biliary stent placed 8/23/2019 for chronic cholecystitis and biliary stricture. Now s/p ERCP stent exchange due to biliary  obstruction (10/29) with evidence of cholangitis seen during procedure.    - S/p 1D zosyn. S/p 2D ertapenem. Transition to PO ciprofloxacin for 8 day course after ertapenem.  - ID consulted, agree with recs     # Recurrent pleural effusions  - s/p thoracentesis 10/30     # Severe malnutrition, failure to thrive  # Hyponatremia  # Hypomagnesemia  # Hypophosphatemia  - Continue PTA marinol  - Nutrition consult; calorie counts  - Unable to undergo chemo with luch a low albumin  - Electrolyte replacement protocol     # RLE edema  - Doppler US: no DVT     # Hydronephrosis R kidney  - R ureteral stent in place. Resolution of hydronephrosis seen on CT (10/29)  - Continue PTA flomax    Yesenia Salgado MD  Obstetrics & Gynecology, PGY-2  10/31/2019 3:59 AM    I, Festus Arroyo MD personally examined and evaluated this patient on 10/31/19.  I discussed the patient with the resident and care team, and agree with the assessment and plan of care as documented in the residents note above.    I personally reviewed vital signs, laboratory values and imaging results.    Pt improving s/p stent replacement.  LFTs and bili improving.  Plan discharge home today on PO cipro per ID and GI.  Close follow up with Dr Yeung for discussion of treatment goals/plan.    Giuliana Arroyo MD  Gynecologic Oncology  [facility]

## 2019-10-31 NOTE — PROGRESS NOTES
Calorie Count  Intake recorded for: 10/30  Total Kcals: 820 Total Protein: 28g  Kcals from Hospital Food: 820  Protein: 28g  Kcals from Outside Food (average):0 Protein: 0g  # Meals Recorded: 100% cola  # Supplements Recorded: 100% 2 Boost Plus    Note: pt had [employer] but not enough information was given to calculate calories/protien.

## 2019-10-31 NOTE — PROGRESS NOTES
[id number]  Neuro: A/Ox4.  Vitals:AVSS. BP soft 90s/50s no SOB or lightheadedness.  Respiratory: O2 sats stable on RA  GI/: voiding spontanously. Pt reports 1 BM this shift.   Diet/appetite: Regular diet. No nausea. Little appetite. On arline counts   Activity:  SBA   Pain: Pain controlled with scheduled MS contin.   Skin: No new deficits noted.   LDA's: New PIV placed infusing NS with 20mEq of KCL at 100mL/hr.     Plan: Continue with POC. Notify primary team with changes.

## 2019-10-31 NOTE — PLAN OF CARE
A x O, VSS on RA. Pt denies pain and nausea. Heartburn managed w/ Tums and Protonix. Regular diet, on arline counts. Voiding spontaneously, remind to save urine. +Flatus, no BM this shift. SBA. Continue POC.

## 2019-10-31 NOTE — PLAN OF CARE
Physical Therapy Discharge Summary    Reason for therapy discharge:    Discharged to home.    Progress towards therapy goal(s). See goals on Care Plan in [facility] electronic health record for goal details.  Goals met    Therapy recommendation(s):    Pt anticipates she will have someone at home with her at all times at discharge. Pt was not interested in home care at this time, but may consider home care in the future.

## 2019-11-01 NOTE — PROGRESS NOTES
"Care Coordinator Note  Talked with Marcos, \"I have the pain under control she is sleeping well but I am not sleeping so good. . Eating is so so!!, but try not to fight about it.  \"  I think we need to have the thoracentesis done before it is an emergency.\" She is sleeping now.   Here feet are getting swollen, Dr Yeung says that is because the protein in low in her body.\"  \"She also talked about placing a tube in her lungs to drain it.\"   Will discuss move at her Thursday Visit.   Offered support and active listening.  Make arrangement to have thoracentesis twice weekly until a decision made regarding a PleuRX catheter is placed.      Called and left a message  Regarding the thoracenteses for Monday at [place] and Friday for [facility].      Marcos verbalized back understanding of the above information discussed.   Tammy CISNEROS RN, OCN  Care Coordinator   Gynecologic Cancer   Office [phone]    "

## 2019-11-04 NOTE — DISCHARGE INSTRUCTIONS
Thoracentesis Discharge Instructions     After you go home:      You may resume your normal diet    Care of Puncture Site:      For the first 48 hrs, check your puncture site every couple hours while you are awake     If there is a bandaid - you may remove it tomorrow morning    You may shower tomorrow    No tub baths, whirlpools or swimming until your puncture site has fully healed     Activity:      You may go back to normal activity in 24 hours    Wait 48 hours before lifting, straining, exercise or other strenuous activity    Medicines:      You may resume all your medications    For minor pain, you may take Acetaminophen (Tylenol) or Ibuprofen (Advil)            Call the provider who ordered this procedure if:      The site is red, swollen, hot or tender    Blood or fluid is draining from the site    You have chills or a fever greater than 101 F (38 C)    Shortness of breath    Pain that is getting worse    Any questions or concerns      Additional Information:      During this test, a needle will sometimes enter the lung. This can cause the lung to collapse - called a pneumothorax. Symptoms include severe chest pain, breathing problems or increased blood in your sputum (phlegm).     Call  911 or go to the Emergency Room if:      Severe chest pain or trouble breathing    Increased blood in your sputum (phlegm)    Bleeding that you cannot control      If you have questions call:        [facility] Radiology Dept @ [phone]      The provider who performed your procedure was Dr. Valdez.

## 2019-11-04 NOTE — PROGRESS NOTES
Care Suites Admission Nursing Note    Reason for admission: thoracentesis  CS arrival time: 1120  Accompanied by: spouse  Name/phone of DC : Marcos () [phone]  Medications held: none  Consent signed: To be obtained  Abnormal assessment/labs: none  If abnormal, provider notified: N/A  Education/questions answered: discharge teaching  Plan: thoracentesis

## 2019-11-04 NOTE — PROGRESS NOTES
RADIOLOGY PROCEDURE NOTE  Patient name: Odalys Nathan  MRN: [id number]  : 1960    Pre-procedure diagnosis: Right pleural effusion  Post-procedure diagnosis: Same    Procedure Date/Time: 2019  1:02 PM  Procedure: Thoracentesis  Estimated blood loss: None  Specimen(s) collected with description: Pleural fluid.  The patient tolerated the procedure well with no immediate complications.    See imaging dictation for procedural details and findings.    Provider name: Augustine Valdez MD  Assistant(s):None

## 2019-11-06 NOTE — TELEPHONE ENCOUNTER
Left message for pt to call back for MTM phone appointment.      Priyanka Wolfe, Pharm.D, [facility]  Medication Therapy Management Pharmacist  [phone]

## 2019-11-07 NOTE — NURSING NOTE
"Oncology Rooming Note    November 7, 2019 9:39 AM   Odalys Nathan is a 59 year old female who presents for:    Chief Complaint   Patient presents with     Blood Draw     Labs drawn via  by RN in lab. VS taken.      Oncology Clinic Visit     Return Ovarian Ca     Initial Vitals: BP 92/45   Pulse 108   Temp 97.5  F (36.4  C) (Oral)   Resp 16   Wt 60.1 kg (132 lb 9.6 oz)   SpO2 97%   BMI 22.07 kg/m   Estimated body mass index is 22.07 kg/m  as calculated from the following:    Height as of 10/28/19: 1.651 m (5' 5\").    Weight as of this encounter: 60.1 kg (132 lb 9.6 oz). Body surface area is 1.66 meters squared.  No Pain (0) Comment: Data Unavailable   No LMP recorded. Patient has had a hysterectomy.  Allergies reviewed: Yes  Medications reviewed: Yes    Medications: MEDICATION REFILLS NEEDED TODAY. Provider was notified.  Pharmacy name entered into [facility]: [id number] - [facility] [id number] [address]    Clinical concerns: Refill on Albuterol inhaler; leg and feet swelling; appetite is slightly improving along with water intake.      Naomi Pelayo CMA              "

## 2019-11-07 NOTE — PROGRESS NOTES
Care Coordinator Note  Offered support and active listening.  Will reschedule her thoracentesis for next week to Monday and Thursday and then schedule additional ones until she sees Dr Yeung in early December.    Home shabnam referral to assess safety issues in the house pain management.  Patient verbalized back understanding of the above information discussed.   Reviewed pabs from today and not needing exta supplements.    Face to face time spent with patient 10.  Tammy CISNEROS RN, OCN  Care Coordinator   Gynecologic Cancer   Office [phone]

## 2019-11-07 NOTE — PROGRESS NOTES
Gynecologic Oncology Follow-Up Note    RE: Odalys Nathan  MRN: [id number]  : 1960  Date of Visit: 2019    CC: Odalys Nathan is a 59 year old year old female with recurrent ovarian cancer who presents today for follow up regarding disease management.    HPI: Odalys comes to the clinic accompanied by her  Marcos, Daughter and mother. Feeling better but winded easily when goes upstairs, will not let her do stairs by herself due to instability. When has thoracentesis feels much better and less SOB. Last done Monday removed 1000cc. Scheduled for another tap tomorrow. Using neb patient seems to think it helps. Albuterol helps her to go back to sleep. Sleeping at night but sleeps sitting up. Taste is improving in her mouth. When has constipation has pain, using miralax then has no pain. Takes Senakot daily. Eating solid food and pudding and broths. No vomiting unless gets constipation. Plan for 11/15 to have a larger bilary stent placed with Dr. Lloyd.  On Cipro currently q 12 hours then plan for once daily until follow up with ID.      Oncology History:  2012 - Admitted to hospital for 2 weeks of intermittent abdominal cramping, distention, diarrhea and N/V. CT of abdomen/pelvis significant for small bowel obstruction, a heterogenous soft tissue density in the pelvis, omental nodules, and ascites. Bilateral adnexal masses per U/S of pelvis. CA-125 was elevated at 987, CEA was normal at 1.0.    12 - Therapeutic paracentesis (4 L) with cytology confirming malignancy (NH positive, weak ER positive, CK-7 positive consistent with GYN primary). Surgery recommended d/t potential for falling blood counts 2/2 chemotherapy (patient is Anabaptist and limiting blood transfusion)    12 - Exploratory laparotomy, MAR BSO, lysis of adhesion, Appendectomy, Repair cystotomy, Omentectomy Lwto-xoulpk-eaokzdhpd-transverse-colon resection, Ileo-descending colon anastomosis, CUSA, &  Colonoscopy (done by Dr. Amato and colorectal team).  2/20/2012 - Admitted to hospital for bilateral pulmonary emboli and drainage of pleural effusion. Started on Lovenox.  2/28/12-3/21/12: Cycle #1-2 Carbo/Taxol IV  3/29/12 - Started on Keflex by her PCP for infection in her healing wound (immediately below her umbilicus).    4/11/12-6/14/12: Cycle #3-6 IV chemo, Cycle #1 IV/IP chemo  7/16/12 -  8, CT PRADEEP - enrolled in  - observation arm  3/4/13-6/28/13:  6, 9, 12, 15, 20.  7/1/13: CT Chest, Abdomen, Pelvis IMPRESSION:  1. Worsening metastatic ovarian carcinoma suggested by increased size of soft tissue nodules anterior to the right psoas muscle, that may represent growing mesenteric lymphadenopathy.  2. Remaining prominent right lower quadrant mesenteric lymph nodes are not significantly changed from CT 7/16/2012.  3. Clustered nodular opacities in the right lower lobe are not significant change from 7/16/2012 and remain indeterminate. Again, the appearance and distribution is suggestive of an infectious etiology.  4. Stable 8 mm soft tissue nodule in left breast, unchanged since at least 02/20/2012. This can be observed on followup studies, but correlation with mammography could be considered.  Decision made to start Doxil/Carbo.  7/11/13: The left ventricular ejection fraction is normal at 66.4%.  7/12/13-9/6/13: Cycle #1-3 Doxil/Carbo.  10, 11.    9/30/13 CT C/A/P Impression:  1. Overall, favorable response to treatment with decreasing size of soft tissue nodules tracking along the anterior aspect of the right psoas muscle.  2. 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  3. Improved cluster of right lower lobe pulmonary nodular opacities. These may represent resolving infection.  10/3/13-12/9/13:  9, 8, 10. Cycle 4-6 Doxil/Carbo.    1/13/14 CT C/A/P Impression:   1. Stable appearance of metastatic ovarian cancer. Scattered soft tissue nodules along the anterior aspect of the  right psoas muscle are unchanged in size. Mild mesenteric lymphadenopathy is unchanged.  2. Clustered micronodules in the right lower lobe are unchanged from 9/30/2013, but improved from 7/1/2013. This history suggests a postinflammatory/postinfectious etiology.  3. 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  1/16/14 Discussed multiple options for her based on relatively stable-appearing disease on CT but slight increase in  (which has had small increase to 20 with last recurrence) including chemo break with recheck of  in 1 month, starting new chemo agent immediately, and exploratory surgery with possible resection of nodules. She is considered platinum-sensitive based on > 1 year remission after Taxol/Carbo, which we will take into consideration for future chemo planning. I am not inclined to surgery at this time given difficulty she already has with diarrhea secondary to past colon resection. I suspect we would need to resect further bowel due to mesenteric disease. Also explained inherent risks of any major surgery. Also mentioned maintenance chemo, but this has not been shown to increase overall survival and would likely decrease her quality of life without significant benefit. Family is going on vacation to [place] in 2 weeks and Odalys does not want to have chemo prior to that, so will plan to take 1 month break. She can have  at that time (discussed checking toady since last draw was in early December, but as it would likely not change treatment plan and she has h/o slow rising , will not check today).  2/17/14  16    2/20/14: Decision to take break from chemo for two months, followed by CT and CA-125.    4/21/14  27  4/21/14 CT C/A/P Impression:    1. Increased size of 2 low-attenuation lymph nodes anterior to the right psoas muscle is concerning for worsening metastatic ovarian cancer.    2. New circumferential thickening of a 3.8 cm length segment of distal  "transverse colon is likely physiologic. Recommend attention on followup imaging.    3. Grossly unchanged size of clustered small nodules versus scarring in the right lower lobe the lungs.    4. Stable thrombosis of the right ovarian vein.  5/14/14: Diagnostic laparoscopy converted to exploratory laparotomy and removal of mesenteric masses, tumor debulking, peritoneal biopsies and intraperitoneal port placement. On laparoscopy, it was noted that there were small nodules on the anterior abdominal wall near the previous incision, small nodules on the right pelvic sidewall as well. Nodules were palpated in the mesentery; however, as it was unable to clarify where the origin of the nodules was, the decision was made to open the patient. On opening there was found to be approximately a 3 cm nodule in the small bowel mesentery and another separate approximately 2 cm nodule in the bowel mesentery. Pelvis without evidence of cancer, some mesenteric lymph nodes were palpated. No evidence otherwise of any disseminated cancer throughout the abdomen.    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 (2/2)  -Largest metastasis: 1.3 cm  -See comment  D: Peritoneum, right paracolic gutter #2, biopsy:  -Fibroadipose tissue with granulomatous inflammation surrounding refractile material  -Negative for malignancy   E: Small bowel adhesion, biopsy:  -Fibroconnective tissue, consistent with adhesion  -Negative for malignancy  F: Lymph nodes, mesentry, not otherwise specified, excision:  -Two lymph nodes, negative for metastatic carcinoma (0/2)  G: Lymph node, mesentery, \"#2\", excision:  -One " "lymph node, negative for metastatic carcinoma (0/1)  H: Lymph nodes, mesentery, \"nodule #2\", excision:  -Five lymph nodes, negative for metastatic carcinoma (0/5)  COMMENT:  Some of the specimens show post-operative changes. Others show possible treatment related changes, including necrosis. The metastatic carcinoma in the mesenteric lymph nodes (specimen C) shows variable morphology, including relatively low grade tumor with papillary architecture, and high grade tumor comprised of nests of tumor cells with irregular, slit-like spaces and marked nuclear pleomorphism.    5/29/14: Cycle 1 IV PACLitaxel / IP CISplatin / IP PACLitaxel.  - 28.  6/26/14: Cycle #2 IV/IP.  10  8/5/14: CT chest/abd/pelvis IMPRESSION     1. In this patient with ovarian cancer, overall findings are indicative of stable/slight improvement, as multiple mesenteric lymphadenopathy and scattered nodular peritoneal soft tissue mass lesions appear unchanged or slightly smaller since 4/21/2014.    2. 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 Mild dilatation of the second and the third portion of the duodenum with a narrow SMA angle. This could represent SMA syndrome, if clinically correlated.17/14:    Cycle #3 IV/IP.  10.    CT chest/abd/pelvis with contrast on 8/5/14    Impression:    1. In this patient with ovarian cancer, overall findings are indicative of stable/slight improvement, as multiple mesenteric lymphadenopathy and scattered nodular peritoneal soft tissue mass lesions appear unchanged or slightly smaller since 4/21/2014.    2. 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 Mild dilatation of the second and the third portion of the duodenum with a narrow SMA angle. This could represent SMA syndrome, if clinically correlated.  8/7/14: Cycle #4 Taxol/Carbo (changed from IV/IP).  10. She has been feeling okay. She is unsure if she can finish out the course of 6 cycles IV/IP " taxol/cisplatin. She feels like she has the flu for about a week then starts feeling gradually better after each chemo cycle. Her spouse notes that she actually has been more sick with the treatments than she initially admits here. She was also previously having some rib pain. Denies any rib pain now. Denies any chest pain or shortness of breath.  Plan: discussed recent CT cap results and switching to just IV as she is feeling miserable with IP treatments. Switch to IV carbo/taxol as patient is platinum sensitive.  We also discussed her taking part of the tesaro trial, which would require BRCA testing. She would like to take part in this trial if eligible.  8/20/14: Remove Intraperitoneal Port ( Port and catheter intact - discarded)  8/28/14: Cycle #5 Taxol/Carbo held due to thrombocytopenia.  6.    She denies any vaginal bleeding, no changes in her bowel or bladder habits, no nausea/emesis, no lower extremity edema, and no difficulties eating or sleeping. She denies any abdominal discomfort/bloating, no fevers or chills, and no chest pain or shortness of breath. She states her diarrhea is the same. She reports some fatigue which improves about 1-2 weeks after her chemotherapy. She states she does not need any medication refills and she was told she does not meet the criteria for the TESARO trial. She states she has 3 bags of iv fluids left over from her previous chemotherapy and will give these to herself. She states she is ready for her treatment today.    9/29/14: Cycle #6 Taxol/Carbo  6. Insurance questions regarding GSF coverage today. No concerns other than fatigue. Taking iron for anemia and does not desire blood transfusion. Using neulasta for neutropenia. Using home IV hydration if needed. Baseline unchanged. No abdominal bloating, constipation, diarrhea, pain, vaginal or rectal bleeding, cough or dyspnea, fluid retention.    10/16/14: Impression:    1. Nodular peritoneal soft tissue mass in the  right lower quadrant adjacent to the psoas muscle is no longer appreciated. Adjacent prominent lymphadenopathy is unchanged from previous exam. No new peritoneal lesions.    2. 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     10/20/14:  5. CT chest/abdomen/pelvis on 10/16/14 showed nodular peritoneal soft tissue mass in the right lower quadrant adjacent to the psoas muscle is no longer appreciated. Adjacent prominent lymphadenopathy is unchanged from previous exam. 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  1/27/15:  6.  4/28/15:  14.  5/26/15:  18.  6/2/15: CT cap Impression:  1. Postsurgical changes of hysterectomy and bilateral salpingo-oophorectomy for ovarian cancer. There is a new 8 mm hazy, ill-defined hypoattenuating lesion in hepatic segment 6 which is suspicious for a metastatic deposit. Further evaluation with ultrasound in recommended.    2. Increased size of a left retroperitoneal lymph node which is indeterminate but may represent a ciro metastasis. Mildly prominent lymph nodes in the right lower quadrant are not significantly changed.  3. Moderate colonic stool burden.    6/4/15: US abdomen IMPRESSION:    Hyperechoic lesion in the right hepatic lobe, consistent with hemangioma. This does not corresponds to the area of the lesion seen on CT from 6/2/2015. An MR would be helpful for identifying and characterizing the lesion from the recent CT.  6/12/15: MR abd IMPRESSION:  1. New 20 x 11 mm enhancing lesions between the right obliques, concerning for metastatic disease. This lesions should be amenable to percutaneous biopsy, if indicated.  2. Correlating to the lesion visualized on comparison CT is a hepatic segment 6 subcapsular 7 mm lesion. Overall the appearance favors the diagnosis of a simple cyst. However, there is faint suggestion of mild peripheral arterial enhancement. Although this is favored as  artifactual, this  should be followed up to confirm stability. Recommend 6 month followup.  3. Hepatic segment 6, 5 mm lesion too small to technically characterize. Differential would favor FNH, less likely flash filling hemangioma. Recommend attention on followup.  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      9/1/15: Cycle #1 Avastin/Cytoxan.   31.      9/24/15:feeling generally well. She says she has been having back and stomach spasms. She is taking cytosine daily (she ran out yesterday). She also says its affecting her voice. Admits that it burns occasionally. She is eating and drinking normal. She also admit diarrhea, 5-7 times daily, lose/watery. She trying to stay hydrated and eat fiber. She also says that her body is sore, especially the bottom of her feet. Her blood pressure is normal. She also admits having headache after her first infusion.       9/24/15: Cycle #2 Avastin/Cytoxan.  19.  10/15/15: Cycle #3 Avastin/Cytoxan.  16.      11/6/15: CT c/a/p IMPRESSION:    1. Stable postoperative change of MAR/BSO for ovarian cancer.  2. The lesion in the right flank abdominal musculature is slightly decreased in size. Otherwise, stable examination.  2. No evidence of metastatic disease in the chest.     11/20/15: Treatment planning visit,  16     11/25/15: surgical pathology report  FINAL DIAGNOSIS:  Soft tissue, right oblique muscle mass, excision:  -Recurrent ovarian serous carcinoma  -Carcinoma is present less than 1 mm from one resection margin  -Background skeletal muscle and fibroadipose tissue     1/4/16:  22  1/11/16-1/27/16: Radiation to right flank x 12 treatments  4/7/2016:  94. CT cap IMPRESSION:    In this patient with ovarian cancer status post MAR/BSO and descending/transverse colectomy:  1. No evidence for malignancy in the chest, abdomen, or  pelvis.  2. Stable small hypodense segment 6 liver lesion, appears more likely benign, possibly a cyst.  5/13/16:  124.  6/3/16: PET CT IMPRESSION: In this patient with a history of ovarian cancer:  1. Hypermetabolic and enlarging periaortic and perihepatic lymphadenopathy compatible with metastatic disease, as detailed above.  2. Although hypodense lesion in hepatic segment 6 has been present since 6/2/2015 associated hypermetabolism makes this lesion highly concerning for metastatic disease.     Plan: to start Niraparib under TESARO study.      6/9/16:  137.  6/14/16: Cycle #1 Niraparib.    6/28/16: Cycle #1 D15 Niraparib.    7/5/16:  100.    7/11/16: Cycle #2 Nraparib.   83.     8/3/2016: PET CT IMPRESSION:    In this patient with known history of ovarian cancer:  1) New pleural based nodular opacities in the lateral and inferior aspects of the bilateral lower lobes, worse in the left lung. Likely infection. Close follow up is recommended.      2) Slight decrease in hypermetabolic abdominal lymphadenopathy. 2 hypermetabolic lymph nodes persist.  3) Unchanged right hepatic lobe metastatic lesion.      8/9/16: Cycle #3 Niraparib.  69.  9/6/16: Cycle #4 Niraparib.  53. Dose held due to anemia.  9/13/16: Eval for potential cycle 4 niraparib. Dose held due to anemia.  10/4/16: CT CAP impression:  IMPRESSION: In this patient with a known history of ovarian cancer:  1. There has been interval resolution of pleural-based nodular  opacities which likely represented infection.  2. Abdominal lymphadenopathy in the form of 2 portacaval lymph nodes  have not significantly changed in size, noted to be hypermetabolic on  prior PET/CT.  3. Previously demonstrated metastatic lesion in the right lobe of the  liver is not significantly changed.  10/11/16:  60  11/1/16: C6 niraparib,  75  11/29/16: C7 niraparib.  78. CT CAP impression as follows:  Target lesions (RECIST  criteria):       A previously described target lesion superior to the head of the  pancreas (series 2, image 64)  (referred to as a perihepatic node on  6/3/2016) may not be a valid target lesion because it measured less  than 1.5 cm originally. However, this particular node has decreased in  size, now measuring 7 mm in short axis versus 14 mm on 6/3/2016 when  measured in a similar fashion.       2.3 cm short axis portacaval lymph node on series 2 image 67,  previously 2.0 cm on 10/4/2016.       1.2 cm subtle hypodensity in hepatic segment 6 on series 2 image 75,  stable on multiple studies since at least 6/3/2016     Sum of diameters today: 3.5 cm. Sum of diameters 10/4/2016: 3.2 cm.  Growth = 9%.    12/27/16: C8 niraparib.  105.  1/25/17: C9 niraparib.  108.  2/23/17: C10 niraparib.  132.   CT CAP impression:  Sum of target lesion diameters today: 3.7 cm. Sum of target lesion  diameters on 11/28/2016: 3.5 cm. Growth= 6%  1. In this patient with history of ovarian cancer there is stable  disease by RECIST criteria as evidenced by:   1a. Mildly increased size of liver metastasis.  1b. Stable portacaval lymphadenopathy.  1c. No evidence of metastatic disease in the chest.  2. Trace emphysematous changes of the lungs.  3/22/17: C11 niraparib.  132.  4/19/17: C12 niraparib.  127.  5/16/17: CT CAP IMPRESSION: In this patient with ovarian cancer:  1. Mildly increased size of hepatic metastasis segment 6 with subtle increased capsular retraction.  2. Stable edmundo hepatis nodes, with mild increase in size of periaortic lymph nodes.  3. Prominent left supraclavicular lymph node with subtle increase in size compared to prior studies, particularly comparing to 10/4/2016.  4. Mild subtle groundglass opacities in the right upper lobe, not present on prior study, lesser extent in the right lower lobe.  Findings may represent infection, additional consideration is malignancy (less likely), and  attention on follow-up study  Recommended.  Addendum:   Prominent left supraclavicular lymph node (3/25) is stable from most recent CT performed 2/20/2017, currently measuring 14 x 16 mm, previously 14 x 16 mm on 2/20/2017.      The portal caval lymph node/edmundo hepatis lymph node is stable from 2/20/2017, measuring 21 mm.      Clarification of size of the para-aortic lymph node (series 3 image 327). It short axis measurement is 11 mm versus 9 mm on prior study.      Hepatic segment 6 triangular-shaped low density lesion (3/362) measures 14 mm, previously measured 12 mm, demonstrating a  possible/questionable minimal subtle increase in size. Similarly the lymph nodes noted above in the supraclavicular and para-aortic regions demonstrate possible minimal possible subtle increase in size.      5/18/17: Cycle #13 Niraparib.  191.  6/15/17: Cycle #14 Niraparib.  146.  7/13/17: Cycle #15 Niraparib 200 mg.  171     CT (8/9/17):       IMPRESSION:  1. Segment 6 hepatic metastasis is stable to minimally increased in size.  2. Slight increase in multicentric adenopathy, most pronounced at the edmundo hepatis. Additional sites include the inferior left neck/supraclavicular region and retroperitoneum which appear stable to  minimally increased.      8/10/17:  175  9/14/17: MUGA LVEF 54%  9/22/17: C1D1 carboplatin/Doxil.  187.  10/19/17: C2D1 carboplatin/Doxil.  108.  11/17/17: C3D1 carboplatin/Doxil.  82.  12/14/17: C4D1 carboplatin/Doxil/avastin.  83.  Of note, avastin held on C4D14  1/12/18: C5D1 carboplatin/Doxil/avastin.  80.  1/26/18: platelets 61, patient was not given avastin due to being jehova's witness.   2/9/18: C6D1 carboplatin/Doxil/Avastin.   71.  3/2/18:  49. Three month treatment break.  6/20/2018:  170. CT CAP:  IMPRESSION: In this patient with history of ovarian cancer:  1. Increased size of a right hepatic lobe lesion and numerous edmundo  hepatis and retroperitoneal lymph nodes compatible with progression of metastatic disease.  2. New mild intrahepatic biliary ductal dilatation, periportal edema, and pericholecystic fluid. Increased soft tissue fullness in the edmundo hepatis in the expected location of the common hepatic duct. Findings  are suspicious for developing biliary ductal obstruction secondary to worsening metastatic disease in the edmundo hepatis. Correlation with liver function tests is recommended. Right upper quadrant ultrasound  may be beneficial.  3. Unchanged left supraclavicular and right hilar lymphadenopathy in the chest.      8/3/18: C1D1 weekly paclitaxel.  not done.  9/20/18: C2D1 weekly paclitaxel 80mg/m2. Ca 125-56  11/8/2018: C3D1 weekly paclitaxel;  26     12/17/18: Ct cap IMPRESSION:  1. New area of lobulated hypodense nodularity at the far-inferior right liver. This raises the possibility of a new area of hepatic metastasis.  2. Conversely, other nodules within the right liver are smaller suggesting improvement.  3. Improved adenopathy at the abdominal retroperitoneum. Improved left  supraclavicular adenopathy. Stable mildly prominent right hilar lymph nodes.  4. Previously noted ill-defined soft tissue at the edmundo hepatis region is still present but appears less prominent in size.  5. New finding of segmental wall thickening of the proximal sigmoid colon with some fluid distention of the adjacent colon. This could represent a segmental colitis. Other etiologies not excluded.     12/20/18:   19.  1/22/19:  45.  3/20/19: CT cap IMPRESSION:  1. Progression of disease with increasing size of a right inferior hepatic mass consistent with metastatic disease.  2. Increasing ill-defined multifocal regions of soft tissue at the edmundo hepatis consistent with malignant adenopathy versus malignant implants. Associated increased intrahepatic biliary ductal dilatation.  3. Other areas of progressive adenopathy  noted at the left neck base, mediastinum, and abdominal retroperitoneum.  4. New area of carcinomatosis medial to stomach at the left upper abdomen.     3/20/19: Cycle #1 weekly paclitaxel.   180.  5/7/19: Cycle #2 weekly paclitaxel.    142.  6/20/19: Cycle #3 weekly paclitaxel/  259.     7/11/19: CT CAP-IMPRESSION:  1. Slight increased size of the left supraclavicular lymph node.  Slight progression of the mediastinal lymph nodes is also evident. New  periesophageal node as described above. Retroperitoneal nodes are  stable if not slightly smaller. Some of these nodes appear to have  partially calcified suggesting a response to interval therapy.  2. Interval decreased size of the inferior right hepatic lobe lesion  now with an area of central calcification or enhancement. No new liver  lesions. Remaining abdominal organs are grossly unremarkable. No  significant hydronephrosis.  3. Postop changes involving the bowel and pelvic region. No recurrent  pelvic mass.     8/8/19: C1 gemcitabine 800mg/m2 IV days 1&8.  446.    8/23/19: Endoscopic Retrograde Cholangiopancreatogram with 4 mm Hurricane Balloon Dilation, gallbladder stent and Bile Duct stent placements, biliary Sphincterotomy with Dr. Lloyd. Was to move ERCP to 9/12 but readmitted to hospital       8/29/19: C2 gemcitabine 800mg/m2 IV days 1&8.  548.    10/17/2019-10/20/2019: - Electrolyte abnormalities (Hyponatremia, Hypokalemia, Hypomagnesemia) s/p repletion  - Bilateral pleural effusion, s/p right thoracentesis  - Recurrent ovarian cancer with mets to the liver and brain  - Retroperitoneal and mesenteric adenopathy  - Failure to thrive   - Severe malnutrition    Recurrent ovarian cancer in the setting of recent resection of brain mets and gamma knife radiation    10/24/2019-10/26/2019: hospital admission-[facility]  1.  Shortness of breath due to recurrent malignant right pleural effusion; s/p thoracentesis.  1.45 liters  removed.     2.  Hypokalemia, acute on chronic.  Replaced per potassium replacement protocol.     3.  Hypomagnesemia, acute on chronic.  Replaced per Magnesium replacement protocol.     4.  Hypophosphatemia, acute on chronic.  Replaced per Phosphorous replacement protocol.      5.  Mildly elevated troponin level.  Suspect demand ischemia from large right pleural effusion and respiratory difficulty. Echo showed normal EF and no WMA.      6.  Metastatic ovarian cancer (mets to liver and brain).       7.  Biliary stent in place with plans to convert to metal stent in near future.     8. Ureter stent in place with some hematuria.      9.  Recent klebsiella bacteremia of unclear source; on prolonged course of Augmentin.     10.  Chronic pain syndrome managed with chronic opiate therapy.     11.  Hypothyroidism.       12.  GERD.      10/28/2019: Admit to hospital  Discharge Dx:  1) Recurrent ovarian cancer, progressing disease  2) Biliary obstruction/Cholangitis  3) Recurrent pleural effusions  4) Failure to thrive  5) Hyponatremia, resolved  6) Hypomagnesemia, resolved  7) Hypophosphatemia, resolved  8) Asymmetric RLE edema  9) Elevated LFTs and bili  10) Anemia of chronic disease  11) Severe Malnutrition    10/29/2019: Procedure:           ERCP   Impression:          - Well positioned transpapillary gallbladder stent left                        in situ and not manipulated                        - Well positioned left primary stent removed from a                        widely patent biliary sphincterotomy                        - Synchronous malignant stenoses of the bifurcation and                        common duct with impressive intrahepatic (left more so                        than right) dilation                        - Evidence of cholangitis in the form of pus                        - Successful stent placement deep to the right and left                        intrahepatics with excellent drainage achieved           Date Value Ref Range Status   10/17/2019 1,993 (H) 0 - 30 U/mL Final     Comment:     Assay Method:  Chemiluminescence using Siemens Centaur XP   10/03/2019 1,398 (H) 0 - 30 U/mL Final     Comment:     Assay Method:  Chemiluminescence using Siemens Centaur XP   08/29/2019 548 (H) 0 - 30 U/mL Final     Comment:     Assay Method:  Chemiluminescence using Siemens Centaur XP   08/08/2019 446 (H) 0 - 30 U/mL Final     Comment:     Assay Method:  Chemiluminescence using Siemens Centaur XP   07/24/2019 425 (H) 0 - 30 U/mL Final     Comment:     Assay Method:  Chemiluminescence using Siemens Centaur XP   07/15/2019 340 (H) 0 - 30 U/mL Final     Comment:     Assay Method:  Chemiluminescence using Siemens Centaur XP   06/20/2019 259 (H) 0 - 30 U/mL Final     Comment:     Assay Method:  Chemiluminescence using Siemens Centaur XP   05/07/2019 142 (H) 0 - 30 U/mL Final     Comment:     Assay Method:  Chemiluminescence using Siemens Centaur XP   03/20/2019 180 (H) 0 - 30 U/mL Final     Comment:     Assay Method:  Chemiluminescence using Siemens Centaur XP   03/11/2019 147 (H) 0 - 30 U/mL Final     Comment:     Assay Method:  Chemiluminescence using Siemens Centaur XP     Lab Results   Component Value Date    WBC 11.4 11/07/2019     Lab Results   Component Value Date    RBC 3.52 11/07/2019     Lab Results   Component Value Date    HGB 10.5 11/07/2019     Lab Results   Component Value Date    HCT 32.3 11/07/2019     Lab Results   Component Value Date    MCV 92 11/07/2019     Lab Results   Component Value Date    MCH 29.8 11/07/2019     Lab Results   Component Value Date    MCHC 32.5 11/07/2019     Lab Results   Component Value Date    RDW 20.5 11/07/2019     Lab Results   Component Value Date     11/07/2019     Last Comprehensive Metabolic Panel:  Sodium   Date Value Ref Range Status   11/07/2019 130 (L) 133 - 144 mmol/L Final     Potassium   Date Value Ref Range Status   11/07/2019 3.4 3.4 - 5.3 mmol/L Final      Chloride   Date Value Ref Range Status   11/07/2019 99 94 - 109 mmol/L Final     Carbon Dioxide   Date Value Ref Range Status   11/07/2019 24 20 - 32 mmol/L Final     Anion Gap   Date Value Ref Range Status   11/07/2019 7 3 - 14 mmol/L Final     Glucose   Date Value Ref Range Status   11/07/2019 135 (H) 70 - 99 mg/dL Final     Urea Nitrogen   Date Value Ref Range Status   11/07/2019 9 7 - 30 mg/dL Final     Creatinine   Date Value Ref Range Status   11/07/2019 0.65 0.52 - 1.04 mg/dL Final     GFR Estimate   Date Value Ref Range Status   11/07/2019 >90 >60 mL/min/[1.73_m2] Final     Comment:     Non  GFR Calc  Starting 12/18/2018, serum creatinine based estimated GFR (eGFR) will be   calculated using the Chronic Kidney Disease Epidemiology Collaboration   (CKD-EPI) equation.       Calcium   Date Value Ref Range Status   11/07/2019 8.2 (L) 8.5 - 10.1 mg/dL Final     T. Bili down to 3.4, lab 1.4,alk phos 739    Past Medical History:   Diagnosis Date     Antiplatelet or antithrombotic long-term use      Ascites      Blood clot in the legs      Diabetes (H)      Ovarian cancer (H)     serous,stg IV     Pleural effusion      Pulmonary embolism (H) 2/2012     Refusal of blood transfusions as patient is Latter day      Short gut syndrome      Subclinical hypothyroidism 4/18/2013     Thrombosis of leg        Past Surgical History:   Procedure Laterality Date     COLECTOMY       COLONOSCOPY  2/1/2012    Procedure:COLONOSCOPY; With Biopsy; Surgeon:TONI SULLIVAN; Location:[facility] OR     CYSTOSCOPY, RETROGRADES, INSERT STENT URETER(S), COMBINED Right 10/4/2019    Procedure: CYSTOSCOPY, WITH RETROGRADE PYELOGRAM AND URETERAL STENT INSERTION;  Surgeon: Wolf Gan MD;  Location: [facility]     ENDOSCOPIC RETROGRADE CHOLANGIOPANCREATOGRAM N/A 8/23/2019    Procedure: Endoscopic Retrograde Cholangiopancreatogram with 4 mm Hurricane Balloon Dilation, gallbladder stent and Bile Duct stent placements,  Bilarry Sphincterotomy ;  Surgeon: Catrachito Lloyd MD;  Location: [facility] OR     ENDOSCOPIC RETROGRADE CHOLANGIOPANCREATOGRAM N/A 10/29/2019    Procedure: ENDOSCOPIC RETROGRADE CHOLANGIOPANCREATOGRAPH WITH BILARY STENT EXCHANGE, DEBRIDE AND STONE REMOVAL ;  Surgeon: Stephen Londono MD;  Location: [facility] OR     HYSTERECTOMY TOTAL ABD, RHIANNA SALPINGO-OOPHORECTOMY, NODE DISSECTION, TUMOR DEBULKING, COMBINED  2/1/2012    Procedure:COMBINED HYSTERECTOMY TOTAL ABDOMINAL, BILATERAL SALPINGO-OOPHORECTOMY, NODE DISSECTION, TUMOR DEBULKING;  Exploratory Laparotomy, Total Abdominal Hysterectomy, Bilateral Salpingo-Oophorectomy, appendectomy,lysis of adhesions, ileal, ascending, transverse and splenic flexure resection, ileal descending bowel renanastomosis, incidental cystotomy repair, CUSA procedure and colonoscopy ; [facility]     INSERT PORT PERITONEAL ACCESS  4/3/2012    Procedure:INSERT PORT PERITONEAL ACCESS; Intraperitoneal Port Placement (c-arm); Surgeon:SAMUEL CARRASCO; Location:[facility]     INSERT PORT PERITONEAL ACCESS  5/14/2014    Procedure: INSERT PORT PERITONEAL ACCESS;  Surgeon: Nga Yeung MD;  Location: [facility] OR     INSERT PORT VASCULAR ACCESS       IR PORT REMOVAL RIGHT  9/20/2019     LAPAROSCOPY DIAGNOSTIC (GYN)  5/14/2014    Procedure: LAPAROSCOPY DIAGNOSTIC (GYN);  Surgeon: Nga Yeung MD;  Location: [facility]     LAPAROTOMY EXPLORATORY Right 11/25/2015    Procedure: LAPAROTOMY EXPLORATORY;  Surgeon: Nga Yeung MD;  Location: [facility]     LAPAROTOMY, TUMOR DEBULKING, COMBINED  5/14/2014    Procedure: COMBINED LAPAROTOMY, TUMOR DEBULKING;  Surgeon: Nga Yeung MD;  Location: [facility]     OPTICAL TRACKING SYSTEM CRANIOTOMY, EXCISE TUMOR, COMBINED Right 9/18/2019    Procedure: Stealth Assisted Right Craniotomy And Tumor Resection;  Surgeon: Bertin Dewey MD;  Location: [facility]     PICC INSERTION Left 09/20/2019    5Fr - 46cm (4cm external), basilic vein, low SVC     REMOVE CATHETER  PERITONEAL N/A 8/20/2014    Procedure: REMOVE CATHETER PERITONEAL;  Surgeon: Nga Yeung MD;  Location: [facility]     VASCULAR SURGERY      stent left iliac vein       Current Outpatient Medications   Medication     albuterol (PROAIR HFA/PROVENTIL HFA/VENTOLIN HFA) 108 (90 Base) MCG/ACT inhaler     Ascorbic Acid (VITAMIN C PO)     Calcium Carbonate-Vitamin D (CALCIUM + D PO)     ciprofloxacin (CIPRO) 500 MG tablet     [START ON 11/8/2019] ciprofloxacin (CIPRO) 500 MG tablet     cyanocobalamin (VITAMIN B12) 1000 MCG/ML injection     diphenoxylate-atropine (LOMOTIL) 2.5-0.025 MG per tablet     dronabinol (MARINOL) 2.5 MG capsule     Ferrous Sulfate 324 (65 Fe) MG TBEC     guaiFENesin (MUCINEX) 600 MG 12 hr tablet     HEMP OIL OR EXTRACT OR OTHER CBD CANNABINOID, NOT MEDICAL CANNABIS,     HERBALS     ipratropium - albuterol 0.5 mg/2.5 mg/3 mL (DUONEB) 0.5-2.5 (3) MG/3ML neb solution     LEVOTHYROXINE SODIUM PO     loperamide (IMODIUM) 2 MG capsule     LORazepam (ATIVAN) 1 MG tablet     magnesium oxide (MAG-OX) 400 MG tablet     medical cannabis (Patient's own supply)     morphine (MS CONTIN) 15 MG CR tablet     naloxone (NARCAN) 4 MG/0.1ML nasal spray     ondansetron (ZOFRAN-ODT) 4 MG ODT tab     order for DME     oxyCODONE (ROXICODONE) 5 MG tablet     pantoprazole (PROTONIX) 40 MG EC tablet     polyethylene glycol (MIRALAX/GLYCOLAX) packet     potassium chloride (KLOR-CON) 20 MEQ packet     prochlorperazine (COMPAZINE) 10 MG tablet     tamsulosin (FLOMAX) 0.4 MG capsule     VITAMIN E NATURAL PO     No current facility-administered medications for this visit.        No Known Allergies    Family History   Problem Relation Age of Onset     Cancer Mother 69        lung, smoker     Cancer Maternal Uncle 65        brain     Colon Cancer Maternal Aunt 80        colon       Social History     Socioeconomic History     Marital status:      Spouse name: Not on file     Number of children: Not on file     Years of  education: Not on file     Highest education level: Not on file   Occupational History     Not on file   Social Needs     Financial resource strain: Not on file     Food insecurity:     Worry: Not on file     Inability: Not on file     Transportation needs:     Medical: Not on file     Non-medical: Not on file   Tobacco Use     Smoking status: Former Smoker     Packs/day: 0.00     Years: 8.00     Pack years: 0.00     Types: Cigarettes     Last attempt to quit: 1980     Years since quittin.4     Smokeless tobacco: Never Used     Tobacco comment: started at 11 yo and quit at 16 yo   Substance and Sexual Activity     Alcohol use: Yes     Comment: 3x/day wine or jodi     Drug use: No     Sexual activity: Not on file   Lifestyle     Physical activity:     Days per week: Not on file     Minutes per session: Not on file     Stress: Not on file   Relationships     Social connections:     Talks on phone: Not on file     Gets together: Not on file     Attends Christian service: Not on file     Active member of club or organization: Not on file     Attends meetings of clubs or organizations: Not on file     Relationship status: Not on file     Intimate partner violence:     Fear of current or ex partner: Not on file     Emotionally abused: Not on file     Physically abused: Not on file     Forced sexual activity: Not on file   Other Topics Concern     Parent/sibling w/ CABG, MI or angioplasty before 65F 55M? Not Asked   Social History Narrative     Not on file           ROS  12 point ROS reviewed and as above.        Physical Exam:    BP 92/45   Pulse 108   Temp 97.5  F (36.4  C) (Oral)   Resp 16   Wt 60.1 kg (132 lb 9.6 oz)   SpO2 97%   BMI 22.07 kg/m      CONSTITUTIONAL: Chronically ill but non-toxic appearing female, appears fatigued and thin, less jaundice, in wheelchair  HEAD: Normocephalic, atraumatic; temporal wasting  EYES: PERRLA; jaundiced, skin appears less yellow   ENT: Oropharynx pink without  lesions, thick white coating to tongue  NECK: Neck supple, firm fixed left supraclavicular node roughly 4cm x 4cm, non-tender without erythema or edema, not eroding through skin  RESPIRATORY: CAMMY, LLL and RUL clear to auscultation, RML and RLL with diminished breath sounds, respirations unlabored   CV: Tachycardic, S1S2, no clicks, murmurs, rubs, or gallops; bilateral lower extremities without edema, dorsalis pedis pulses 2+ bilaterally  GASTROINTESTINAL: Normoactive bowel sounds x4 quadrants, abdomen soft, slightly distended, and non-tender to palpation without masses or organomegaly, no rebound tenderness, guarding, or rigidity  GENITOURINARY: Not indicated  MUSCULOSKELETAL: Moves all extremities; BUE 5/5 strength, RLE plantarflexion 4/5, RLE dorsiflexion 3/5, BLE plantarflexion and dorsiflexion 5/5  NEUROLOGIC: CN II-XII intact, gait not assessed  SKIN: Appropriate color for race, warm and dry, no rashes or lesions to unclothed skin  PSYCHIATRIC: Pleasant and interactive, affect bright, makes appropriate eye contact    Labs:     None drawn today.    Assessment/Plan:    Disease: Less jaundiced now T. Bili down to 3.4  ID: Finish Cipro 500 mg po bid then start 500 mg po daily until sees ID on 11/21/19 regarding need for continued antibiotics due to cholangitis.  RTC in December with me so that we can assess where she is at that point. Considering chemotherapy still but wants to be at home as much as possible.    Has commode and shower chair now. Will get HHN to come in and assess to see if anything further needed at home.    DNR/DNI established with patient today. I suspect she is heading toward deciding on hospice care but will plan to see her in 1 month to see how she is feeling. I explained I would not consider chemotherapy until off antibiotics as too weak and fatigued now.    >40 minutes of face to face time were spent with the patient with over 50% of that time spent in counseling, coordination of care,  education, and symptom management.    Nga Yeung MD    Department of Ob/Gyn and Women's Health  Division of Gynecologic Oncology  [facility]  [phone]

## 2019-11-07 NOTE — LETTER
2019       RE: Odalys Nathan  [address]  [facility] [id number]     Dear Colleague,    Thank you for referring your patient, Odalys Nathan, to the [facility]. Please see a copy of my visit note below.    Gynecologic Oncology Follow-Up Note    RE: Odalys Nathan  MRN: [id number]  : 1960  Date of Visit: 2019    CC: Odalys Nathan is a 59 year old year old female with recurrent ovarian cancer who presents today for follow up regarding disease management.    HPI: Odalys comes to the clinic accompanied by her  Marcos, Daughter and mother. Feeling better but winded easily when goes upstairs, will not let her do stairs by herself due to instability. When has thoracentesis feels much better and less SOB. Last done Monday removed 1000cc. Scheduled for another tap tomorrow. Using neb patient seems to think it helps. Albuterol helps her to go back to sleep. Sleeping at night but sleeps sitting up. Taste is improving in her mouth. When has constipation has pain, using miralax then has no pain. Takes Senakot daily. Eating solid food and pudding and broths. No vomiting unless gets constipation. Plan for 11/15 to have a larger bilary stent placed with Dr. Lloyd.  On Cipro currently q 12 hours then plan for once daily until follow up with ID.      Oncology History:  2012 - Admitted to hospital for 2 weeks of intermittent abdominal cramping, distention, diarrhea and N/V. CT of abdomen/pelvis significant for small bowel obstruction, a heterogenous soft tissue density in the pelvis, omental nodules, and ascites. Bilateral adnexal masses per U/S of pelvis. CA-125 was elevated at 987, CEA was normal at 1.0.    12 - Therapeutic paracentesis (4 L) with cytology confirming malignancy (VT positive, weak ER positive, CK-7 positive consistent with GYN primary). Surgery recommended d/t potential for falling blood counts 2/2 chemotherapy (patient is  Shinto and limiting blood transfusion)    2/1/12 - Exploratory laparotomy, MAR BSO, lysis of adhesion, Appendectomy, Repair cystotomy, Omentectomy Hfal-khggxi-owhutcrws-transverse-colon resection, Ileo-descending colon anastomosis, CUSA, & Colonoscopy (done by Dr. Amato and colorectal team).  2/20/2012 - Admitted to hospital for bilateral pulmonary emboli and drainage of pleural effusion. Started on Lovenox.  2/28/12-3/21/12: Cycle #1-2 Carbo/Taxol IV  3/29/12 - Started on Keflex by her PCP for infection in her healing wound (immediately below her umbilicus).    4/11/12-6/14/12: Cycle #3-6 IV chemo, Cycle #1 IV/IP chemo  7/16/12 -  8, CT PRADEEP - enrolled in  - observation arm  3/4/13-6/28/13:  6, 9, 12, 15, 20.  7/1/13: CT Chest, Abdomen, Pelvis IMPRESSION:  1. Worsening metastatic ovarian carcinoma suggested by increased size of soft tissue nodules anterior to the right psoas muscle, that may represent growing mesenteric lymphadenopathy.  2. Remaining prominent right lower quadrant mesenteric lymph nodes are not significantly changed from CT 7/16/2012.  3. Clustered nodular opacities in the right lower lobe are not significant change from 7/16/2012 and remain indeterminate. Again, the appearance and distribution is suggestive of an infectious etiology.  4. Stable 8 mm soft tissue nodule in left breast, unchanged since at least 02/20/2012. This can be observed on followup studies, but correlation with mammography could be considered.  Decision made to start Doxil/Carbo.  7/11/13: The left ventricular ejection fraction is normal at 66.4%.  7/12/13-9/6/13: Cycle #1-3 Doxil/Carbo.  10, 11.    9/30/13 CT C/A/P Impression:  1. Overall, favorable response to treatment with decreasing size of soft tissue nodules tracking along the anterior aspect of the right psoas muscle.  2. 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  3. Improved cluster of right lower lobe pulmonary nodular  opacities. These may represent resolving infection.  10/3/13-12/9/13:  9, 8, 10. Cycle 4-6 Doxil/Carbo.    1/13/14 CT C/A/P Impression:   1. Stable appearance of metastatic ovarian cancer. Scattered soft tissue nodules along the anterior aspect of the right psoas muscle are unchanged in size. Mild mesenteric lymphadenopathy is unchanged.  2. Clustered micronodules in the right lower lobe are unchanged from 9/30/2013, but improved from 7/1/2013. This history suggests a postinflammatory/postinfectious etiology.  3. 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  1/16/14 Discussed multiple options for her based on relatively stable-appearing disease on CT but slight increase in  (which has had small increase to 20 with last recurrence) including chemo break with recheck of  in 1 month, starting new chemo agent immediately, and exploratory surgery with possible resection of nodules. She is considered platinum-sensitive based on > 1 year remission after Taxol/Carbo, which we will take into consideration for future chemo planning. I am not inclined to surgery at this time given difficulty she already has with diarrhea secondary to past colon resection. I suspect we would need to resect further bowel due to mesenteric disease. Also explained inherent risks of any major surgery. Also mentioned maintenance chemo, but this has not been shown to increase overall survival and would likely decrease her quality of life without significant benefit. Family is going on vacation to [place] in 2 weeks and Odalys does not want to have chemo prior to that, so will plan to take 1 month break. She can have  at that time (discussed checking toady since last draw was in early December, but as it would likely not change treatment plan and she has h/o slow rising , will not check today).  2/17/14  16    2/20/14: Decision to take break from chemo for two months, followed by CT and CA-125.    4/21/14   "27  4/21/14 CT C/A/P Impression:    1. Increased size of 2 low-attenuation lymph nodes anterior to the right psoas muscle is concerning for worsening metastatic ovarian cancer.    2. New circumferential thickening of a 3.8 cm length segment of distal transverse colon is likely physiologic. Recommend attention on followup imaging.    3. Grossly unchanged size of clustered small nodules versus scarring in the right lower lobe the lungs.    4. Stable thrombosis of the right ovarian vein.  5/14/14: Diagnostic laparoscopy converted to exploratory laparotomy and removal of mesenteric masses, tumor debulking, peritoneal biopsies and intraperitoneal port placement. On laparoscopy, it was noted that there were small nodules on the anterior abdominal wall near the previous incision, small nodules on the right pelvic sidewall as well. Nodules were palpated in the mesentery; however, as it was unable to clarify where the origin of the nodules was, the decision was made to open the patient. On opening there was found to be approximately a 3 cm nodule in the small bowel mesentery and another separate approximately 2 cm nodule in the bowel mesentery. Pelvis without evidence of cancer, some mesenteric lymph nodes were palpated. No evidence otherwise of any disseminated cancer throughout the abdomen.    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 (2/2)  -Largest metastasis: 1.3 cm  -See comment  D: Peritoneum, right paracolic gutter #2, biopsy:  -Fibroadipose tissue with granulomatous inflammation surrounding refractile material  -Negative for malignancy   E: Small bowel adhesion, " "biopsy:  -Fibroconnective tissue, consistent with adhesion  -Negative for malignancy  F: Lymph nodes, mesentry, not otherwise specified, excision:  -Two lymph nodes, negative for metastatic carcinoma (0/2)  G: Lymph node, mesentery, \"#2\", excision:  -One lymph node, negative for metastatic carcinoma (0/1)  H: Lymph nodes, mesentery, \"nodule #2\", excision:  -Five lymph nodes, negative for metastatic carcinoma (0/5)  COMMENT:  Some of the specimens show post-operative changes. Others show possible treatment related changes, including necrosis. The metastatic carcinoma in the mesenteric lymph nodes (specimen C) shows variable morphology, including relatively low grade tumor with papillary architecture, and high grade tumor comprised of nests of tumor cells with irregular, slit-like spaces and marked nuclear pleomorphism.    5/29/14: Cycle 1 IV PACLitaxel / IP CISplatin / IP PACLitaxel.  - 28.  6/26/14: Cycle #2 IV/IP.  10  8/5/14: CT chest/abd/pelvis IMPRESSION     1. In this patient with ovarian cancer, overall findings are indicative of stable/slight improvement, as multiple mesenteric lymphadenopathy and scattered nodular peritoneal soft tissue mass lesions appear unchanged or slightly smaller since 4/21/2014.    2. 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 Mild dilatation of the second and the third portion of the duodenum with a narrow SMA angle. This could represent SMA syndrome, if clinically correlated.17/14:    Cycle #3 IV/IP.  10.    CT chest/abd/pelvis with contrast on 8/5/14    Impression:    1. In this patient with ovarian cancer, overall findings are indicative of stable/slight improvement, as multiple mesenteric lymphadenopathy and scattered nodular peritoneal soft tissue mass lesions appear unchanged or slightly smaller since 4/21/2014.    2. 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 Mild dilatation of the second and the third portion of the " duodenum with a narrow SMA angle. This could represent SMA syndrome, if clinically correlated.  8/7/14: Cycle #4 Taxol/Carbo (changed from IV/IP).  10. She has been feeling okay. She is unsure if she can finish out the course of 6 cycles IV/IP taxol/cisplatin. She feels like she has the flu for about a week then starts feeling gradually better after each chemo cycle. Her spouse notes that she actually has been more sick with the treatments than she initially admits here. She was also previously having some rib pain. Denies any rib pain now. Denies any chest pain or shortness of breath.  Plan: discussed recent CT cap results and switching to just IV as she is feeling miserable with IP treatments. Switch to IV carbo/taxol as patient is platinum sensitive.  We also discussed her taking part of the tesaro trial, which would require BRCA testing. She would like to take part in this trial if eligible.  8/20/14: Remove Intraperitoneal Port ( Port and catheter intact - discarded)  8/28/14: Cycle #5 Taxol/Carbo held due to thrombocytopenia.  6.    She denies any vaginal bleeding, no changes in her bowel or bladder habits, no nausea/emesis, no lower extremity edema, and no difficulties eating or sleeping. She denies any abdominal discomfort/bloating, no fevers or chills, and no chest pain or shortness of breath. She states her diarrhea is the same. She reports some fatigue which improves about 1-2 weeks after her chemotherapy. She states she does not need any medication refills and she was told she does not meet the criteria for the TESARO trial. She states she has 3 bags of iv fluids left over from her previous chemotherapy and will give these to herself. She states she is ready for her treatment today.    9/29/14: Cycle #6 Taxol/Carbo  6. Insurance questions regarding GSF coverage today. No concerns other than fatigue. Taking iron for anemia and does not desire blood transfusion. Using neulasta for  neutropenia. Using home IV hydration if needed. Baseline unchanged. No abdominal bloating, constipation, diarrhea, pain, vaginal or rectal bleeding, cough or dyspnea, fluid retention.    10/16/14: Impression:    1. Nodular peritoneal soft tissue mass in the right lower quadrant adjacent to the psoas muscle is no longer appreciated. Adjacent prominent lymphadenopathy is unchanged from previous exam. No new peritoneal lesions.    2. 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     10/20/14:  5. CT chest/abdomen/pelvis on 10/16/14 showed nodular peritoneal soft tissue mass in the right lower quadrant adjacent to the psoas muscle is no longer appreciated. Adjacent prominent lymphadenopathy is unchanged from previous exam. 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  1/27/15:  6.  4/28/15:  14.  5/26/15:  18.  6/2/15: CT cap Impression:  1. Postsurgical changes of hysterectomy and bilateral salpingo-oophorectomy for ovarian cancer. There is a new 8 mm hazy, ill-defined hypoattenuating lesion in hepatic segment 6 which is suspicious for a metastatic deposit. Further evaluation with ultrasound in recommended.    2. Increased size of a left retroperitoneal lymph node which is indeterminate but may represent a ciro metastasis. Mildly prominent lymph nodes in the right lower quadrant are not significantly changed.  3. Moderate colonic stool burden.    6/4/15: US abdomen IMPRESSION:    Hyperechoic lesion in the right hepatic lobe, consistent with hemangioma. This does not corresponds to the area of the lesion seen on CT from 6/2/2015. An MR would be helpful for identifying and characterizing the lesion from the recent CT.  6/12/15: MR abd IMPRESSION:  1. New 20 x 11 mm enhancing lesions between the right obliques, concerning for metastatic disease. This lesions should be amenable to percutaneous biopsy, if indicated.  2. Correlating to the lesion  visualized on comparison CT is a hepatic segment 6 subcapsular 7 mm lesion. Overall the appearance favors the diagnosis of a simple cyst. However, there is faint suggestion of mild peripheral arterial enhancement. Although this is favored as  artifactual, this should be followed up to confirm stability. Recommend 6 month followup.  3. Hepatic segment 6, 5 mm lesion too small to technically characterize. Differential would favor FNH, less likely flash filling hemangioma. Recommend attention on followup.  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      9/1/15: Cycle #1 Avastin/Cytoxan.   31.      9/24/15:feeling generally well. She says she has been having back and stomach spasms. She is taking cytosine daily (she ran out yesterday). She also says its affecting her voice. Admits that it burns occasionally. She is eating and drinking normal. She also admit diarrhea, 5-7 times daily, lose/watery. She trying to stay hydrated and eat fiber. She also says that her body is sore, especially the bottom of her feet. Her blood pressure is normal. She also admits having headache after her first infusion.       9/24/15: Cycle #2 Avastin/Cytoxan.  19.  10/15/15: Cycle #3 Avastin/Cytoxan.  16.      11/6/15: CT c/a/p IMPRESSION:    1. Stable postoperative change of MAR/BSO for ovarian cancer.  2. The lesion in the right flank abdominal musculature is slightly decreased in size. Otherwise, stable examination.  2. No evidence of metastatic disease in the chest.     11/20/15: Treatment planning visit,  16     11/25/15: surgical pathology report  FINAL DIAGNOSIS:  Soft tissue, right oblique muscle mass, excision:  -Recurrent ovarian serous carcinoma  -Carcinoma is present less than 1 mm from one resection margin  -Background skeletal muscle and fibroadipose tissue     1/4/16: CA  125 22  1/11/16-1/27/16: Radiation to right flank x 12 treatments  4/7/2016:  94. CT cap IMPRESSION:    In this patient with ovarian cancer status post MAR/BSO and descending/transverse colectomy:  1. No evidence for malignancy in the chest, abdomen, or pelvis.  2. Stable small hypodense segment 6 liver lesion, appears more likely benign, possibly a cyst.  5/13/16:  124.  6/3/16: PET CT IMPRESSION: In this patient with a history of ovarian cancer:  1. Hypermetabolic and enlarging periaortic and perihepatic lymphadenopathy compatible with metastatic disease, as detailed above.  2. Although hypodense lesion in hepatic segment 6 has been present since 6/2/2015 associated hypermetabolism makes this lesion highly concerning for metastatic disease.     Plan: to start Niraparib under TESARO study.      6/9/16:  137.  6/14/16: Cycle #1 Niraparib.    6/28/16: Cycle #1 D15 Niraparib.    7/5/16:  100.    7/11/16: Cycle #2 Nraparib.   83.     8/3/2016: PET CT IMPRESSION:    In this patient with known history of ovarian cancer:  1) New pleural based nodular opacities in the lateral and inferior aspects of the bilateral lower lobes, worse in the left lung. Likely infection. Close follow up is recommended.      2) Slight decrease in hypermetabolic abdominal lymphadenopathy. 2 hypermetabolic lymph nodes persist.  3) Unchanged right hepatic lobe metastatic lesion.      8/9/16: Cycle #3 Niraparib.  69.  9/6/16: Cycle #4 Niraparib.  53. Dose held due to anemia.  9/13/16: Eval for potential cycle 4 niraparib. Dose held due to anemia.  10/4/16: CT CAP impression:  IMPRESSION: In this patient with a known history of ovarian cancer:  1. There has been interval resolution of pleural-based nodular  opacities which likely represented infection.  2. Abdominal lymphadenopathy in the form of 2 portacaval lymph nodes  have not significantly changed in size, noted to be hypermetabolic on  prior  PET/CT.  3. Previously demonstrated metastatic lesion in the right lobe of the  liver is not significantly changed.  10/11/16:  60  11/1/16: C6 niraparib,  75  11/29/16: C7 niraparib.  78. CT CAP impression as follows:  Target lesions (RECIST criteria):       A previously described target lesion superior to the head of the  pancreas (series 2, image 64)  (referred to as a perihepatic node on  6/3/2016) may not be a valid target lesion because it measured less  than 1.5 cm originally. However, this particular node has decreased in  size, now measuring 7 mm in short axis versus 14 mm on 6/3/2016 when  measured in a similar fashion.       2.3 cm short axis portacaval lymph node on series 2 image 67,  previously 2.0 cm on 10/4/2016.       1.2 cm subtle hypodensity in hepatic segment 6 on series 2 image 75,  stable on multiple studies since at least 6/3/2016     Sum of diameters today: 3.5 cm. Sum of diameters 10/4/2016: 3.2 cm.  Growth = 9%.    12/27/16: C8 niraparib.  105.  1/25/17: C9 niraparib.  108.  2/23/17: C10 niraparib.  132.   CT CAP impression:  Sum of target lesion diameters today: 3.7 cm. Sum of target lesion  diameters on 11/28/2016: 3.5 cm. Growth= 6%  1. In this patient with history of ovarian cancer there is stable  disease by RECIST criteria as evidenced by:   1a. Mildly increased size of liver metastasis.  1b. Stable portacaval lymphadenopathy.  1c. No evidence of metastatic disease in the chest.  2. Trace emphysematous changes of the lungs.  3/22/17: C11 niraparib.  132.  4/19/17: C12 niraparib.  127.  5/16/17: CT CAP IMPRESSION: In this patient with ovarian cancer:  1. Mildly increased size of hepatic metastasis segment 6 with subtle increased capsular retraction.  2. Stable edmundo hepatis nodes, with mild increase in size of periaortic lymph nodes.  3. Prominent left supraclavicular lymph node with subtle increase in size compared to prior studies,  particularly comparing to 10/4/2016.  4. Mild subtle groundglass opacities in the right upper lobe, not present on prior study, lesser extent in the right lower lobe.  Findings may represent infection, additional consideration is malignancy (less likely), and attention on follow-up study  Recommended.  Addendum:   Prominent left supraclavicular lymph node (3/25) is stable from most recent CT performed 2/20/2017, currently measuring 14 x 16 mm, previously 14 x 16 mm on 2/20/2017.      The portal caval lymph node/edmundo hepatis lymph node is stable from 2/20/2017, measuring 21 mm.      Clarification of size of the para-aortic lymph node (series 3 image 327). It short axis measurement is 11 mm versus 9 mm on prior study.      Hepatic segment 6 triangular-shaped low density lesion (3/362) measures 14 mm, previously measured 12 mm, demonstrating a  possible/questionable minimal subtle increase in size. Similarly the lymph nodes noted above in the supraclavicular and para-aortic regions demonstrate possible minimal possible subtle increase in size.      5/18/17: Cycle #13 Niraparib.  191.  6/15/17: Cycle #14 Niraparib.  146.  7/13/17: Cycle #15 Niraparib 200 mg.  171     CT (8/9/17):       IMPRESSION:  1. Segment 6 hepatic metastasis is stable to minimally increased in size.  2. Slight increase in multicentric adenopathy, most pronounced at the edmundo hepatis. Additional sites include the inferior left neck/supraclavicular region and retroperitoneum which appear stable to  minimally increased.      8/10/17:  175  9/14/17: MUGA LVEF 54%  9/22/17: C1D1 carboplatin/Doxil.  187.  10/19/17: C2D1 carboplatin/Doxil.  108.  11/17/17: C3D1 carboplatin/Doxil.  82.  12/14/17: C4D1 carboplatin/Doxil/avastin.  83.  Of note, avastin held on C4D14  1/12/18: C5D1 carboplatin/Doxil/avastin.  80.  1/26/18: platelets 61, patient was not given avastin due to being jehova's witness.    2/9/18: C6D1 carboplatin/Doxil/Avastin.   71.  3/2/18:  49. Three month treatment break.  6/20/2018:  170. CT CAP:  IMPRESSION: In this patient with history of ovarian cancer:  1. Increased size of a right hepatic lobe lesion and numerous edmundo hepatis and retroperitoneal lymph nodes compatible with progression of metastatic disease.  2. New mild intrahepatic biliary ductal dilatation, periportal edema, and pericholecystic fluid. Increased soft tissue fullness in the edmundo hepatis in the expected location of the common hepatic duct. Findings  are suspicious for developing biliary ductal obstruction secondary to worsening metastatic disease in the edmundo hepatis. Correlation with liver function tests is recommended. Right upper quadrant ultrasound  may be beneficial.  3. Unchanged left supraclavicular and right hilar lymphadenopathy in the chest.      8/3/18: C1D1 weekly paclitaxel.  not done.  9/20/18: C2D1 weekly paclitaxel 80mg/m2. Ca 125-56  11/8/2018: C3D1 weekly paclitaxel;  26     12/17/18: Ct cap IMPRESSION:  1. New area of lobulated hypodense nodularity at the far-inferior right liver. This raises the possibility of a new area of hepatic metastasis.  2. Conversely, other nodules within the right liver are smaller suggesting improvement.  3. Improved adenopathy at the abdominal retroperitoneum. Improved left  supraclavicular adenopathy. Stable mildly prominent right hilar lymph nodes.  4. Previously noted ill-defined soft tissue at the edmundo hepatis region is still present but appears less prominent in size.  5. New finding of segmental wall thickening of the proximal sigmoid colon with some fluid distention of the adjacent colon. This could represent a segmental colitis. Other etiologies not excluded.     12/20/18:   19.  1/22/19:  45.  3/20/19: CT cap IMPRESSION:  1. Progression of disease with increasing size of a right inferior hepatic mass consistent with  metastatic disease.  2. Increasing ill-defined multifocal regions of soft tissue at the edmundo hepatis consistent with malignant adenopathy versus malignant implants. Associated increased intrahepatic biliary ductal dilatation.  3. Other areas of progressive adenopathy noted at the left neck base, mediastinum, and abdominal retroperitoneum.  4. New area of carcinomatosis medial to stomach at the left upper abdomen.     3/20/19: Cycle #1 weekly paclitaxel.   180.  5/7/19: Cycle #2 weekly paclitaxel.    142.  6/20/19: Cycle #3 weekly paclitaxel/  259.     7/11/19: CT CAP-IMPRESSION:  1. Slight increased size of the left supraclavicular lymph node.  Slight progression of the mediastinal lymph nodes is also evident. New  periesophageal node as described above. Retroperitoneal nodes are  stable if not slightly smaller. Some of these nodes appear to have  partially calcified suggesting a response to interval therapy.  2. Interval decreased size of the inferior right hepatic lobe lesion  now with an area of central calcification or enhancement. No new liver  lesions. Remaining abdominal organs are grossly unremarkable. No  significant hydronephrosis.  3. Postop changes involving the bowel and pelvic region. No recurrent  pelvic mass.     8/8/19: C1 gemcitabine 800mg/m2 IV days 1&8.  446.    8/23/19: Endoscopic Retrograde Cholangiopancreatogram with 4 mm Hurricane Balloon Dilation, gallbladder stent and Bile Duct stent placements, biliary Sphincterotomy with Dr. Lloyd. Was to move ERCP to 9/12 but readmitted to hospital       8/29/19: C2 gemcitabine 800mg/m2 IV days 1&8.  548.    10/17/2019-10/20/2019: - Electrolyte abnormalities (Hyponatremia, Hypokalemia, Hypomagnesemia) s/p repletion  - Bilateral pleural effusion, s/p right thoracentesis  - Recurrent ovarian cancer with mets to the liver and brain  - Retroperitoneal and mesenteric adenopathy  - Failure to thrive   - Severe  malnutrition    Recurrent ovarian cancer in the setting of recent resection of brain mets and gamma knife radiation    10/24/2019-10/26/2019: hospital admission-[facility]  1.  Shortness of breath due to recurrent malignant right pleural effusion; s/p thoracentesis.  1.45 liters removed.     2.  Hypokalemia, acute on chronic.  Replaced per potassium replacement protocol.     3.  Hypomagnesemia, acute on chronic.  Replaced per Magnesium replacement protocol.     4.  Hypophosphatemia, acute on chronic.  Replaced per Phosphorous replacement protocol.      5.  Mildly elevated troponin level.  Suspect demand ischemia from large right pleural effusion and respiratory difficulty. Echo showed normal EF and no WMA.      6.  Metastatic ovarian cancer (mets to liver and brain).       7.  Biliary stent in place with plans to convert to metal stent in near future.     8. Ureter stent in place with some hematuria.      9.  Recent klebsiella bacteremia of unclear source; on prolonged course of Augmentin.     10.  Chronic pain syndrome managed with chronic opiate therapy.     11.  Hypothyroidism.       12.  GERD.      10/28/2019: Admit to hospital  Discharge Dx:  1) Recurrent ovarian cancer, progressing disease  2) Biliary obstruction/Cholangitis  3) Recurrent pleural effusions  4) Failure to thrive  5) Hyponatremia, resolved  6) Hypomagnesemia, resolved  7) Hypophosphatemia, resolved  8) Asymmetric RLE edema  9) Elevated LFTs and bili  10) Anemia of chronic disease  11) Severe Malnutrition    10/29/2019: Procedure:           ERCP   Impression:          - Well positioned transpapillary gallbladder stent left                        in situ and not manipulated                        - Well positioned left primary stent removed from a                        widely patent biliary sphincterotomy                        - Synchronous malignant stenoses of the bifurcation and                        common duct with impressive intrahepatic  (left more so                        than right) dilation                        - Evidence of cholangitis in the form of pus                        - Successful stent placement deep to the right and left                        intrahepatics with excellent drainage achieved          Date Value Ref Range Status   10/17/2019 1,993 (H) 0 - 30 U/mL Final     Comment:     Assay Method:  Chemiluminescence using Siemens Centaur XP   10/03/2019 1,398 (H) 0 - 30 U/mL Final     Comment:     Assay Method:  Chemiluminescence using Siemens Centaur XP   08/29/2019 548 (H) 0 - 30 U/mL Final     Comment:     Assay Method:  Chemiluminescence using Siemens Centaur XP   08/08/2019 446 (H) 0 - 30 U/mL Final     Comment:     Assay Method:  Chemiluminescence using Siemens Centaur XP   07/24/2019 425 (H) 0 - 30 U/mL Final     Comment:     Assay Method:  Chemiluminescence using Siemens Centaur XP   07/15/2019 340 (H) 0 - 30 U/mL Final     Comment:     Assay Method:  Chemiluminescence using Siemens Centaur XP   06/20/2019 259 (H) 0 - 30 U/mL Final     Comment:     Assay Method:  Chemiluminescence using Siemens Centaur XP   05/07/2019 142 (H) 0 - 30 U/mL Final     Comment:     Assay Method:  Chemiluminescence using Siemens Centaur XP   03/20/2019 180 (H) 0 - 30 U/mL Final     Comment:     Assay Method:  Chemiluminescence using Siemens Centaur XP   03/11/2019 147 (H) 0 - 30 U/mL Final     Comment:     Assay Method:  Chemiluminescence using Siemens Centaur XP     Lab Results   Component Value Date    WBC 11.4 11/07/2019     Lab Results   Component Value Date    RBC 3.52 11/07/2019     Lab Results   Component Value Date    HGB 10.5 11/07/2019     Lab Results   Component Value Date    HCT 32.3 11/07/2019     Lab Results   Component Value Date    MCV 92 11/07/2019     Lab Results   Component Value Date    MCH 29.8 11/07/2019     Lab Results   Component Value Date    MCHC 32.5 11/07/2019     Lab Results   Component Value Date    RDW 20.5  11/07/2019     Lab Results   Component Value Date     11/07/2019     Last Comprehensive Metabolic Panel:  Sodium   Date Value Ref Range Status   11/07/2019 130 (L) 133 - 144 mmol/L Final     Potassium   Date Value Ref Range Status   11/07/2019 3.4 3.4 - 5.3 mmol/L Final     Chloride   Date Value Ref Range Status   11/07/2019 99 94 - 109 mmol/L Final     Carbon Dioxide   Date Value Ref Range Status   11/07/2019 24 20 - 32 mmol/L Final     Anion Gap   Date Value Ref Range Status   11/07/2019 7 3 - 14 mmol/L Final     Glucose   Date Value Ref Range Status   11/07/2019 135 (H) 70 - 99 mg/dL Final     Urea Nitrogen   Date Value Ref Range Status   11/07/2019 9 7 - 30 mg/dL Final     Creatinine   Date Value Ref Range Status   11/07/2019 0.65 0.52 - 1.04 mg/dL Final     GFR Estimate   Date Value Ref Range Status   11/07/2019 >90 >60 mL/min/[1.73_m2] Final     Comment:     Non  GFR Calc  Starting 12/18/2018, serum creatinine based estimated GFR (eGFR) will be   calculated using the Chronic Kidney Disease Epidemiology Collaboration   (CKD-EPI) equation.       Calcium   Date Value Ref Range Status   11/07/2019 8.2 (L) 8.5 - 10.1 mg/dL Final     T. Bili down to 3.4, lab 1.4,alk phos 739    Past Medical History:   Diagnosis Date     Antiplatelet or antithrombotic long-term use      Ascites      Blood clot in the legs      Diabetes (H)      Ovarian cancer (H)     serous,stg IV     Pleural effusion      Pulmonary embolism (H) 2/2012     Refusal of blood transfusions as patient is Sikhism      Short gut syndrome      Subclinical hypothyroidism 4/18/2013     Thrombosis of leg        Past Surgical History:   Procedure Laterality Date     COLECTOMY       COLONOSCOPY  2/1/2012    Procedure:COLONOSCOPY; With Biopsy; Surgeon:TONI SULLIVAN; Location:[facility] OR     CYSTOSCOPY, RETROGRADES, INSERT STENT URETER(S), COMBINED Right 10/4/2019    Procedure: CYSTOSCOPY, WITH RETROGRADE PYELOGRAM AND URETERAL STENT  INSERTION;  Surgeon: Wolf Gan MD;  [facility]     ENDOSCOPIC RETROGRADE CHOLANGIOPANCREATOGRAM N/A 8/23/2019    Procedure: Endoscopic Retrograde Cholangiopancreatogram with 4 mm Hurricane Balloon Dilation, gallbladder stent and Bile Duct stent placements, Bilarry Sphincterotomy ;  Surgeon: Catrachito Lloyd MD;  Location: [facility] OR     ENDOSCOPIC RETROGRADE CHOLANGIOPANCREATOGRAM N/A 10/29/2019    Procedure: ENDOSCOPIC RETROGRADE CHOLANGIOPANCREATOGRAPH WITH BILARY STENT EXCHANGE, DEBRIDE AND STONE REMOVAL ;  Surgeon: Stephen Londono MD;  Location: [facility] OR     HYSTERECTOMY TOTAL ABD, RHIANNA SALPINGO-OOPHORECTOMY, NODE DISSECTION, TUMOR DEBULKING, COMBINED  2/1/2012    Procedure:COMBINED HYSTERECTOMY TOTAL ABDOMINAL, BILATERAL SALPINGO-OOPHORECTOMY, NODE DISSECTION, TUMOR DEBULKING;  Exploratory Laparotomy, Total Abdominal Hysterectomy, Bilateral Salpingo-Oophorectomy, appendectomy,lysis of adhesions, ileal, ascending, transverse and splenic flexure resection, ileal descending bowel renanastomosis, incidental cystotomy repair, CUSA procedure and colonoscopy ; [facility]     INSERT PORT PERITONEAL ACCESS  4/3/2012    Procedure:INSERT PORT PERITONEAL ACCESS; Intraperitoneal Port Placement (c-arm); Surgeon:SAMUEL CARRASCO; Location:[facility]     INSERT PORT PERITONEAL ACCESS  5/14/2014    Procedure: INSERT PORT PERITONEAL ACCESS;  Surgeon: Nga Yeung MD;  Location: [facility] OR     INSERT PORT VASCULAR ACCESS       IR PORT REMOVAL RIGHT  9/20/2019     LAPAROSCOPY DIAGNOSTIC (GYN)  5/14/2014    Procedure: LAPAROSCOPY DIAGNOSTIC (GYN);  Surgeon: Nga Yeung MD;  Location: [facility]     LAPAROTOMY EXPLORATORY Right 11/25/2015    Procedure: LAPAROTOMY EXPLORATORY;  Surgeon: Nga Yeung MD;  [facility]     LAPAROTOMY, TUMOR DEBULKING, COMBINED  5/14/2014    Procedure: COMBINED LAPAROTOMY, TUMOR DEBULKING;  Surgeon: Nga Yeung MD;  Location: [facility]     [employer] TRACKING SYSTEM CRANIOTOMY,  EXCISE TUMOR, COMBINED Right 9/18/2019    Procedure: Stealth Assisted Right Craniotomy And Tumor Resection;  Surgeon: Bertin Dewey MD;  Location: [facility]     PICC INSERTION Left 09/20/2019    5Fr - 46cm (4cm external), basilic vein, low SVC     REMOVE CATHETER PERITONEAL N/A 8/20/2014    Procedure: REMOVE CATHETER PERITONEAL;  Surgeon: Nga Yeung MD;  Location: [facility]     VASCULAR SURGERY      stent left iliac vein       Current Outpatient Medications   Medication     albuterol (PROAIR HFA/PROVENTIL HFA/VENTOLIN HFA) 108 (90 Base) MCG/ACT inhaler     Ascorbic Acid (VITAMIN C PO)     Calcium Carbonate-Vitamin D (CALCIUM + D PO)     ciprofloxacin (CIPRO) 500 MG tablet     [START ON 11/8/2019] ciprofloxacin (CIPRO) 500 MG tablet     cyanocobalamin (VITAMIN B12) 1000 MCG/ML injection     diphenoxylate-atropine (LOMOTIL) 2.5-0.025 MG per tablet     dronabinol (MARINOL) 2.5 MG capsule     Ferrous Sulfate 324 (65 Fe) MG TBEC     guaiFENesin (MUCINEX) 600 MG 12 hr tablet     HEMP OIL OR EXTRACT OR OTHER CBD CANNABINOID, NOT MEDICAL CANNABIS,     HERBALS     ipratropium - albuterol 0.5 mg/2.5 mg/3 mL (DUONEB) 0.5-2.5 (3) MG/3ML neb solution     LEVOTHYROXINE SODIUM PO     loperamide (IMODIUM) 2 MG capsule     LORazepam (ATIVAN) 1 MG tablet     magnesium oxide (MAG-OX) 400 MG tablet     medical cannabis (Patient's own supply)     morphine (MS CONTIN) 15 MG CR tablet     naloxone (NARCAN) 4 MG/0.1ML nasal spray     ondansetron (ZOFRAN-ODT) 4 MG ODT tab     order for DME     oxyCODONE (ROXICODONE) 5 MG tablet     pantoprazole (PROTONIX) 40 MG EC tablet     polyethylene glycol (MIRALAX/GLYCOLAX) packet     potassium chloride (KLOR-CON) 20 MEQ packet     prochlorperazine (COMPAZINE) 10 MG tablet     tamsulosin (FLOMAX) 0.4 MG capsule     VITAMIN E NATURAL PO     No current facility-administered medications for this visit.        No Known Allergies    Family History   Problem Relation Age of Onset     Cancer  Mother 69        lung, smoker     Cancer Maternal Uncle 65        brain     Colon Cancer Maternal Aunt 80        colon       Social History     Socioeconomic History     Marital status:      Spouse name: Not on file     Number of children: Not on file     Years of education: Not on file     Highest education level: Not on file   Occupational History     Not on file   Social Needs     Financial resource strain: Not on file     Food insecurity:     Worry: Not on file     Inability: Not on file     Transportation needs:     Medical: Not on file     Non-medical: Not on file   Tobacco Use     Smoking status: Former Smoker     Packs/day: 0.00     Years: 8.00     Pack years: 0.00     Types: Cigarettes     Last attempt to quit: 1980     Years since quittin.4     Smokeless tobacco: Never Used     Tobacco comment: started at 9 yo and quit at 16 yo   Substance and Sexual Activity     Alcohol use: Yes     Comment: 3x/day wine or jodi     Drug use: No     Sexual activity: Not on file   Lifestyle     Physical activity:     Days per week: Not on file     Minutes per session: Not on file     Stress: Not on file   Relationships     Social connections:     Talks on phone: Not on file     Gets together: Not on file     Attends Evangelical service: Not on file     Active member of club or organization: Not on file     Attends meetings of clubs or organizations: Not on file     Relationship status: Not on file     Intimate partner violence:     Fear of current or ex partner: Not on file     Emotionally abused: Not on file     Physically abused: Not on file     Forced sexual activity: Not on file   Other Topics Concern     Parent/sibling w/ CABG, MI or angioplasty before 65F 55M? Not Asked   Social History Narrative     Not on file           ROS  12 point ROS reviewed and as above.        Physical Exam:    BP 92/45   Pulse 108   Temp 97.5  F (36.4  C) (Oral)   Resp 16   Wt 60.1 kg (132 lb 9.6 oz)   SpO2 97%   BMI  22.07 kg/m       CONSTITUTIONAL: Chronically ill but non-toxic appearing female, appears fatigued and thin, less jaundice, in wheelchair  HEAD: Normocephalic, atraumatic; temporal wasting  EYES: PERRLA; jaundiced, skin appears less yellow   ENT: Oropharynx pink without lesions, thick white coating to tongue  NECK: Neck supple, firm fixed left supraclavicular node roughly 4cm x 4cm, non-tender without erythema or edema, not eroding through skin  RESPIRATORY: CAMMY, LLL and RUL clear to auscultation, RML and RLL with diminished breath sounds, respirations unlabored   CV: Tachycardic, S1S2, no clicks, murmurs, rubs, or gallops; bilateral lower extremities without edema, dorsalis pedis pulses 2+ bilaterally  GASTROINTESTINAL: Normoactive bowel sounds x4 quadrants, abdomen soft, slightly distended, and non-tender to palpation without masses or organomegaly, no rebound tenderness, guarding, or rigidity  GENITOURINARY: Not indicated  MUSCULOSKELETAL: Moves all extremities; BUE 5/5 strength, RLE plantarflexion 4/5, RLE dorsiflexion 3/5, BLE plantarflexion and dorsiflexion 5/5  NEUROLOGIC: CN II-XII intact, gait not assessed  SKIN: Appropriate color for race, warm and dry, no rashes or lesions to unclothed skin  PSYCHIATRIC: Pleasant and interactive, affect bright, makes appropriate eye contact    Labs:     None drawn today.    Assessment/Plan:    Disease: Less jaundiced now T. Bili down to 3.4  ID: Finish Cipro 500 mg po bid then start 500 mg po daily until sees ID on 11/21/19 regarding need for continued antibiotics due to cholangitis.  RTC in December with me so that we can assess where she is at that point. Considering chemotherapy still but wants to be at home as much as possible.    Has commode and shower chair now. Will get HHN to come in and assess to see if anything further needed at home.    DNR/DNI established with patient today. I suspect she is heading toward deciding on hospice care but will plan to see her in 1  month to see how she is feeling. I explained I would not consider chemotherapy until off antibiotics as too weak and fatigued now.    >40 minutes of face to face time were spent with the patient with over 50% of that time spent in counseling, coordination of care, education, and symptom management.    Nga Yeung MD    Department of Ob/Gyn and Women's Health  Division of Gynecologic Oncology  [facility]  [phone]

## 2019-11-07 NOTE — NURSING NOTE
Chief Complaint   Patient presents with     Blood Draw     Labs drawn via  by RN in lab. VS taken.      Labs drawn via venipuncture. Vital signs taken. Checked into next appointment.     Sheeba Moran RN

## 2019-11-08 NOTE — PROGRESS NOTES
Patient tolerated right thoracentesis well.  1600 mL of straw colored fluid drained done by Dr Narayanan  Dressing clean dry and intact with no drainage.  No albumin given. Patient states understanding discharge instructions, taking P.O and home per family.  Yolette Acosta, RN, BSN

## 2019-11-11 NOTE — PROGRESS NOTES
Care Suites Admission Nursing Note    Reason for admission: thoracentesis  CS arrival time: 0930  Accompanied by: /sister  Name/phone of DC : Marcos  Medications held: non  Consent signed: to be by rad  Abnormal assessment/labs: none  If abnormal, provider notified: NA  Education/questions answered: yes  Plan: to Radiology at 1030

## 2019-11-11 NOTE — DISCHARGE INSTRUCTIONS
Thoracentesis Discharge Instructions     After you go home:      You may resume your normal diet    Care of Puncture Site:      For the first 48 hrs, check your puncture site every couple hours while you are awake     If there is a bandaid - you may remove it tomorrow morning    You may shower tomorrow    No tub baths, whirlpools or swimming until your puncture site has fully healed     Activity:      You may go back to normal activity in 24 hours    Wait 48 hours before lifting, straining, exercise or other strenuous activity    Medicines:      You may resume all your medications    For minor pain, you may take Acetaminophen (Tylenol) or Ibuprofen (Advil)            Call the provider who ordered this procedure if:      The site is red, swollen, hot or tender    Blood or fluid is draining from the site    You have chills or a fever greater than 101 F (38 C)    Shortness of breath    Pain that is getting worse    Any questions or concerns      Additional Information:      During this test, a needle will sometimes enter the lung. This can cause the lung to collapse - called a pneumothorax. Symptoms include severe chest pain, breathing problems or increased blood in your sputum (phlegm).     Call  911 or go to the Emergency Room if:      Severe chest pain or trouble breathing    Increased blood in your sputum (phlegm)    Bleeding that you cannot control      If you have questions call:        [facility] Radiology Dept @ [phone]      The provider who performed your procedure was Dr. Salgado.

## 2019-11-11 NOTE — PROCEDURES
[facility]    Procedure: Right thoracentesis  Date/Time: 11/11/2019 11:59 AM  Performed by: Cici Ayala PA-C  Authorized by: Cici Ayala PA-C     UNIVERSAL PROTOCOL   Site Marked: Yes  Prior Images Obtained and Reviewed:  Yes  Required items: Required blood products, implants, devices and special equipment available    Patient identity confirmed:  Verbally with patient  NA - No sedation, light sedation, or local anesthesia  Confirmation Checklist:  Patient's identity using two indicators, relevant allergies, procedure was appropriate and matched the consent or emergent situation and correct equipment/implants were available  Time out: Immediately prior to the procedure a time out was called    Preparation: Patient was prepped and draped in usual sterile fashion    ESBL (mL):  1     ANESTHESIA    Anesthesia: See MAR for details      SEDATION    Patient Sedated: No    See dictated procedure note for full details.  PROCEDURE   Patient Tolerance:  Patient tolerated the procedure well with no immediate complications  Describe Procedure: Right thoracentesis  Time of Sedation in Minutes by Physician:  0

## 2019-11-11 NOTE — DISCHARGE SUMMARY
Care Suites Discharge Nursing Note    Education/questions answered: yes  Patient DC location: [facility]  Accompanied by:  and freind  CS discharge time:1240    1200 mL Straw color fluid removed from pleural space  Patient tolerated procedure well.  Dressing CDI, no swelling or hematoma.  X-ray results read - negative for pnuemothorax  Discharge instructions reviewed with patient

## 2019-11-12 NOTE — LETTER
"    11/12/2019         RE: Odalys Nathan  [address]  [place] [address]        Dear Colleague,    Thank you for referring your patient, Odalys Nathan, to the [facility]. Please see a copy of my visit note below.    CLINICAL NUTRITION SERVICES - ASSESSMENT NOTE    REASON FOR ASSESSMENT  Odalys Nathan is a 59 year old female seen by the dietitian for Medical Nutrition Therapy related to ovarian cancer and Malnutrition, unspecified type (H) [E46]  - Primary referred by Beth Lynch CNP.  Patient accompanied by , Marcos, and sister.    NUTRITION HISTORY  Factors affecting nutrition intake include: decreased appetite, early satiety and taste aversions (too salty and too sweet)    Patient is struggling with intake.  Patient's  and sister are trying to help with food choices.  Patient states she tolerates soft and liquid foods best.     Diet recall:   Breakfast: 2 bites egg, toast, hot chocolate and Boost  Lunch: unable due to appointment  Dinner: broccoli and cheese soup   Beverages: water, hot chocolate  Patient is consuming the following supplements at home: Boost Plus    PHYSICAL FINDINGS  Observed  See malnutrition below  Obtained from Chart/Interdisciplinary Team  Thoracentesis required     ANTHROPOMETRICS  Height: 5'5\"  Weight: 130 lbs  BMI: 21.6 kg/m2  IBW: 125 lbs +/-10%  % IBW: 105%  Weight History: Note increase weight due to fluid status.  Wt Readings from Last 10 Encounters:   11/11/19 59 kg (130 lb)   11/07/19 60.1 kg (132 lb 9.6 oz)   10/30/19 58.9 kg (129 lb 14.4 oz)   10/28/19 57.3 kg (126 lb 6.4 oz)   10/25/19 55.7 kg (122 lb 14.4 oz)   10/17/19 54.8 kg (120 lb 12.8 oz)   10/17/19 53.8 kg (118 lb 8 oz)   10/15/19 54.7 kg (120 lb 8 oz)   10/10/19 54.9 kg (121 lb)   10/09/19 55 kg (121 lb 4.8 oz)     Dosing Weight: 60.1 Kg    LABS  Labs reviewed    MEDICATIONS/VITAMINS/MINERALS  Medications reviewed    PROCEDURES WITH NUTRITIONAL " IMPLICATIONS  Chemotherapy-previous Paclitaxel    ASSESSED NUTRITION NEEDS  Estimated Energy Needs: 2248-5788 kcals (30-35 Kcal/Kg)  Justification: repletion  Estimated Protein Needs: 72-90 protein (1.2-1.5 g pro/Kg)  Justification: Repletion  Estimated Fluid Needs: per MD due to fluid status    ASSESSED MALNUTRITION STATUS  % Intake: </= 50% for >/= 1 month (severe malnutrition)  % Weight Loss: Weight loss does not meet criteria for malnutrition due to fluid balance  Subcutaneous Fat Loss: Orbital region mild depletion  Muscle Loss: Temporal region mild depletion  Fluid/Edema: None noted due to clothing   Weight Status: Overweight BMI 25-29.9  Non severe malnutrition In the context of chronic illness    NUTRITION DIAGNOSIS  Inadequate protein-energy intake related to decreased appetite and taste changes as evidenced by current intake and ability to eat     INTERVENTIONS  Recommendations/Nutrition Prescription  1. 5-6 small meals per day with soft/full liquid foods  2. Aim for 70-90 grams protein per day    Implementation:  -Assessed learning needs and learning preferences  -Nutrition education provided:  --Discussed ways to maximize kcal and protein intake. Discussed calorie and protein needs for repletion.  Advised pt to aim for 9250-1875 kcal and 70-90 g protein via 5-6 small frequent meals. Encouraged patient to focus on food as fuel and energy.   - Suggest soft, full liquid foods due to GI pain when eating as it appears that patient has more pain when eating solid foods.   -Provided pt high kcal, high protein recipes to try (smoothies, mashed potatoes, muffins etc).  Recommend continue oral nutritional supplements.  Provided Oncology Nutrition DPG full liquid diet for patient.    -Reviewed high protein, high kcal snacks (adding butter or oil to foods).       Pt verbalizes understanding of materials provided during consult by stating will continue trying to eat multiple times per day.  Patient Understanding:  good  Expected Compliance: fair-good (due to inability to eat)    Goals  1.  Aim for 5-6 small frequent meals  2.  Aim for 7088-2154 kcal and 70-90 g protein/day    Follow-Up Plans:  Pt.encouraged to follow up with RD in 2-4 weeks  RD name and number provided    Time spent with patient: 45 minutes    Gill Gomez, RD, LD  Clinical Dietitian  [facility]  [phone] (direct)    Again, thank you for allowing me to participate in the care of your patient.        Sincerely,        Malik Carroll, JOSEPH, LD

## 2019-11-12 NOTE — PROGRESS NOTES
"CLINICAL NUTRITION SERVICES - ASSESSMENT NOTE    REASON FOR ASSESSMENT  Odalys Nathan is a 59 year old female seen by the dietitian for Medical Nutrition Therapy related to ovarian cancer and Malnutrition, unspecified type (H) [E46]  - Primary referred by Beth Lynch CNP.  Patient accompanied by , Marcos, and sister.    NUTRITION HISTORY  Factors affecting nutrition intake include: decreased appetite, early satiety and taste aversions (too salty and too sweet)    Patient is struggling with intake.  Patient's  and sister are trying to help with food choices.  Patient states she tolerates soft and liquid foods best.     Diet recall:   Breakfast: 2 bites egg, toast, hot chocolate and Boost  Lunch: unable due to appointment  Dinner: broccoli and cheese soup   Beverages: water, hot chocolate  Patient is consuming the following supplements at home: Boost Plus    PHYSICAL FINDINGS  Observed  See malnutrition below  Obtained from Chart/Interdisciplinary Team  Thoracentesis required     ANTHROPOMETRICS  Height: 5'5\"  Weight: 130 lbs  BMI: 21.6 kg/m2  IBW: 125 lbs +/-10%  % IBW: 105%  Weight History: Note increase weight due to fluid status.  Wt Readings from Last 10 Encounters:   11/11/19 59 kg (130 lb)   11/07/19 60.1 kg (132 lb 9.6 oz)   10/30/19 58.9 kg (129 lb 14.4 oz)   10/28/19 57.3 kg (126 lb 6.4 oz)   10/25/19 55.7 kg (122 lb 14.4 oz)   10/17/19 54.8 kg (120 lb 12.8 oz)   10/17/19 53.8 kg (118 lb 8 oz)   10/15/19 54.7 kg (120 lb 8 oz)   10/10/19 54.9 kg (121 lb)   10/09/19 55 kg (121 lb 4.8 oz)     Dosing Weight: 60.1 Kg    LABS  Labs reviewed    MEDICATIONS/VITAMINS/MINERALS  Medications reviewed    PROCEDURES WITH NUTRITIONAL IMPLICATIONS  Chemotherapy-previous Paclitaxel    ASSESSED NUTRITION NEEDS  Estimated Energy Needs: 5210-5108 kcals (30-35 Kcal/Kg)  Justification: repletion  Estimated Protein Needs: 72-90 protein (1.2-1.5 g pro/Kg)  Justification: Repletion  Estimated Fluid Needs: per " MD due to fluid status    ASSESSED MALNUTRITION STATUS  % Intake: </= 50% for >/= 1 month (severe malnutrition)  % Weight Loss: Weight loss does not meet criteria for malnutrition due to fluid balance  Subcutaneous Fat Loss: Orbital region mild depletion  Muscle Loss: Temporal region mild depletion  Fluid/Edema: None noted due to clothing   Weight Status: Overweight BMI 25-29.9  Non severe malnutrition In the context of chronic illness    NUTRITION DIAGNOSIS  Inadequate protein-energy intake related to decreased appetite and taste changes as evidenced by current intake and ability to eat     INTERVENTIONS  Recommendations/Nutrition Prescription  1. 5-6 small meals per day with soft/full liquid foods  2. Aim for 70-90 grams protein per day    Implementation:  -Assessed learning needs and learning preferences  -Nutrition education provided:  --Discussed ways to maximize kcal and protein intake. Discussed calorie and protein needs for repletion.  Advised pt to aim for 5156-4531 kcal and 70-90 g protein via 5-6 small frequent meals. Encouraged patient to focus on food as fuel and energy.   - Suggest soft, full liquid foods due to GI pain when eating as it appears that patient has more pain when eating solid foods.   -Provided pt high kcal, high protein recipes to try (smoothies, mashed potatoes, muffins etc).  Recommend continue oral nutritional supplements.  Provided Oncology Nutrition DPG full liquid diet for patient.    -Reviewed high protein, high kcal snacks (adding butter or oil to foods).       Pt verbalizes understanding of materials provided during consult by stating will continue trying to eat multiple times per day.  Patient Understanding: good  Expected Compliance: fair-good (due to inability to eat)    Goals  1.  Aim for 5-6 small frequent meals  2.  Aim for 2089-3142 kcal and 70-90 g protein/day    Follow-Up Plans:  Pt.encouraged to follow up with RD in 2-4 weeks  RD name and number provided    Time spent  with patient: 45 minutes    Gill Gomez, RD, LD  Clinical Dietitian  [facility]  [phone] (direct)

## 2019-11-13 NOTE — TELEPHONE ENCOUNTER
Eryn Diop, RN  You 20 minutes ago (8:52 AM)      Atif Dickson   Can you please reschedule to 12/19/19 for [place].          Rescheduled Patient's procedure from 11/15/19 to 12/19/19 per message received above.     11/13/19 914am

## 2019-11-13 NOTE — TELEPHONE ENCOUNTER
Patient is scheduled for surgery with Dr. Gan      Spoke or left message with: patient    Date of Surgery: 12/06/19    Location [facility] OR    H&P: Scheduled with PCP    Post op: NA    Additional imaging/appointments: NA    Surgery packet sent: mailed 11/13/19     Additional comments:  The patient is aware they need a pre-op at least a couple of weeks before surgery date. We went over the patient needing a  and someone to stay with them for 24 hours after the surgery. The patient was given my direct number for any questions [phone]

## 2019-11-13 NOTE — PROGRESS NOTES
Care Coordination Telephone Call  [facility]   Called patient's  to discuss plan for repeat ERCP.  They have asked that we wait to do this for a while due to other appointments and Dr. Lloyd has agreed.     We will move out to about 8 weeks from last ERCP    I have asked the patient to call with any additional questions or concerns and have provided my contact information.    Plan:  ERCP 12/19/19     Eryn MARTINEZ, HNBC, STAR-T  RN Care Coordinator  [facility]  Ph: [phone]  FAX: [phone]

## 2019-11-14 NOTE — PROGRESS NOTES
RADIOLOGY PROCEDURE NOTE  Patient name: Odalys Nathan  MRN: [id number]  : 1960    Pre-procedure diagnosis: Right pleural effusion  Post-procedure diagnosis: Same    Procedure Date/Time: 2019  2:16 PM  Procedure: Right thoracentesis  Estimated blood loss: None  Specimen(s) collected with description: none  The patient tolerated the procedure well with no immediate complications.  Significant findings:Low blood pressure sbp ~90.  Pt states that is her normal.     See imaging dictation for procedural details.    Provider name: Roger Cunningham PA-C  Assistant(s):None

## 2019-11-14 NOTE — PROGRESS NOTES
Care Coordinator Note  See MY chart note.   Reviewed MY chart messages with Dr Yeung.      Dr Yeung  is willing to put in A pleurX cath for her comfort but is concerned about the risk of infection if Gianna is wanting more chemo.  Odalys at this time is wanting more chemo.After much discussion  Plan is Thoracentesis 3 times a week. Starting on 11/18.  Odalys is also thinks she needs a paracentesis arrangements made for that for the 18th also.   Offered support and counseling.   Scheduled the Paracentesis for Monday along with the Thoracentesis and pt was notified.      Patient verbalized back understanding of the above information discussed.   Tammy CISNEROS, RN, OCN  Care Coordinator   Gynecologic Cancer   Office [phone]

## 2019-11-14 NOTE — PROGRESS NOTES
Thoracentesis consent and procedure completed by Roger JOHNSON.  Tolerated well.  No complications.  VSS.  1750 mLs drained from Right.  Yellow color.  Site WDL.  Bandaid applied.  No specimen collected.  CXR to be completed. Reviewed by MD.  BP low will give NS bolus 850rCv5.  BP recheck improved.  Reviewed discharge instructions and questions answered.   Discharge via wc with sister and spouse.

## 2019-11-18 NOTE — PROGRESS NOTES
RADIOLOGY PROCEDURE NOTE  Patient name: Odalys Nathan  MRN: [id number]  : 1960    Pre-procedure diagnosis: Pleural effusion, right; ascites  Post-procedure diagnosis: Same    Procedure Date/Time: 2019  12:22 PM  Procedure: Thoracentesis, right; paracentesis  Estimated blood loss: None  Specimen(s) collected with description: pleural fluid; ascites  The patient tolerated the procedure well with no immediate complications.  Significant findings:none    See imaging dictation for procedural details.    Provider name: ELIZABETH Moore  Assistant(s):None

## 2019-11-18 NOTE — PROGRESS NOTES
Paracentesis completed for 1400 ml clear shola fluid, right thoracentesis completed for 1400 ml clear shola fluid, patient tolerated both procedures well. All discharge criteria were met and patient discharged to home via wheelchair in stable condition with family.

## 2019-11-20 NOTE — PROGRESS NOTES
Right thoracentesis consent obtained by Dr. Salgado.  Pt tolerated 1050 ml's yellow fluid well.  Vital signs documented.  To X-ray post procedure .  Discharge care instructions understood.  Pt discheged to home with family per wheelchair in alert stable condition.

## 2019-11-22 NOTE — IR NOTE
RADIOLOGY POST PROCEDURE NOTE w/ SEDATION  Patient name: Odalys Nathan  MRN: [id number]  : 1960    Pre-procedure diagnosis: pleural effusion  Post-procedure diagnosis: Same    Procedure Date/Time: 2019  8:58 AM  Procedure: thoracentesis  Estimated blood loss: None  Specimen(s) collected with description: none    I determined this patient to be an appropriate candidate for the planned sedation and procedure and reassessed the patient IMMEDIATELY PRIOR to sedation and procedure.     The patient tolerated the procedure well with no immediate complications.  Significant findings:none    See imaging dictation for procedural details.    Provider name: Fran Narayanan MD  Assistant(s):None

## 2019-11-22 NOTE — LETTER
11/22/2019       RE: Odalys Nathan  [id number] [address] [facility] [id number]     Dear Colleague,    Thank you for referring your patient, Odalys Nathan, to the [facility] [place] AND INFECTIOUS DISEASES at [facility]. Please see a copy of my visit note below.      Infectious Disease Note      Odalys Nathan is a 60 y/o woman with recurrent ovarian cancer.   I follow her in my clinic for recurrent cholangitis.  She is currently taking ciprofloxacin 500mg po daily for suppression of cholangitis.      She was presenting today to discuss whether or not we would continue this antibiotic suppression. Odalys had therapeutic thoracentesis earlier today to treat her recurrent pleural effusion. She presents with a BP of 70/49.   I did recheck this myself with a small adult cuff, and it was 72/48.   She presented with her  and sister.   I instructed them to go directly to our Emergency Department.   felt comfortable with driving her the two blocks to the Emergency Room, and understood my directions to go straight there and present immediately.  I called the [facility] ED and spoke with Dr. Alexis to give this report.      Patient and I will be in touch to re-schedule our appointment.  She should continue ciprofloxacin 500mg po daily until she can be seen.      Rita Santana MD, MS  Infectious Disease      Time:  I spent 10 minutes in direct care with this patient, including >50% of time face-to-face with the patient counseling about my instructions to present to the ED.

## 2019-11-22 NOTE — PROGRESS NOTES
Infectious Disease Note      Odalys Nathan is a 58 y/o woman with recurrent ovarian cancer.   I follow her in my clinic for recurrent cholangitis.  She is currently taking ciprofloxacin 500mg po daily for suppression of cholangitis.      She was presenting today to discuss whether or not we would continue this antibiotic suppression. Odalys had therapeutic thoracentesis earlier today to treat her recurrent pleural effusion. She presents with a BP of 70/49.   I did recheck this myself with a small adult cuff, and it was 72/48.   She presented with her  and sister.   I instructed them to go directly to our Emergency Department.   felt comfortable with driving her the two blocks to the Emergency Room, and understood my directions to go straight there and present immediately.  I called the [facility] ED and spoke with Dr. Alexis to give this report.      Patient and I will be in touch to re-schedule our appointment.  She should continue ciprofloxacin 500mg po daily until she can be seen.      Rita Santana MD, MS  Infectious Disease      Time:  I spent 10 minutes in direct care with this patient, including >50% of time face-to-face with the patient counseling about my instructions to present to the ED.

## 2019-11-22 NOTE — ED PROVIDER NOTES
[place] EMERGENCY DEPARTMENT ([facility])  11/22/19    History     Chief Complaint   Patient presents with     Hypotension     History was provided by the patient, the patient's  and medical records.       Odalys Nathan is a 59 year old female with a history of ovarian cancer with metastases to the brain, status post cystoscopy and right ureteral stent placement (10/4/2019), PE (2012) and small bowel obstruction who presents for evaluation of hypotension.  Patient underwent a thoracentesis earlier today followed by an appointment with her infectious disease physician.  Patient is currently taking ciprofloxacin 500 mg p.o. daily for suppression of cholangitis.  The patient states her typical systolic blood pressure is between 90 - 100 but today at her appointment reached 78.  She states she was unable to work during yesterday due to her abdominal discomfort.  Patient's  believes she is dehydrated.  Patient complains of dizziness, weakness and shortness of breath slightly more than her baseline.  She denies fevers, chills, cough or dysuria.  Patient denies any history of anemia.  She notes she is a [employer] and does not wish to receive any blood products    Results for orders placed or performed during the hospital encounter of 11/22/19 (from the past 24 hour(s))   CBC with platelets differential   Result Value Ref Range    WBC 6.2 4.0 - 11.0 10e9/L    RBC Count 3.86 3.8 - 5.2 10e12/L    Hemoglobin 11.0 (L) 11.7 - 15.7 g/dL    Hematocrit 34.4 (L) 35.0 - 47.0 %    MCV 89 78 - 100 fl    MCH 28.5 26.5 - 33.0 pg    MCHC 32.0 31.5 - 36.5 g/dL    RDW 17.4 (H) 10.0 - 15.0 %    Platelet Count 330 150 - 450 10e9/L    Diff Method Automated Method     % Neutrophils 69.9 %    % Lymphocytes 13.5 %    % Monocytes 12.7 %    % Eosinophils 1.8 %    % Basophils 1.3 %    % Immature Granulocytes 0.8 %    Nucleated RBCs 0 0 /100    Absolute Neutrophil 4.3 1.6 - 8.3 10e9/L    Absolute Lymphocytes  0.8 0.8 - 5.3 10e9/L    Absolute Monocytes 0.8 0.0 - 1.3 10e9/L    Absolute Eosinophils 0.1 0.0 - 0.7 10e9/L    Absolute Basophils 0.1 0.0 - 0.2 10e9/L    Abs Immature Granulocytes 0.1 0 - 0.4 10e9/L    Absolute Nucleated RBC 0.0    INR   Result Value Ref Range    INR 1.20 (H) 0.86 - 1.14   Partial thromboplastin time   Result Value Ref Range    PTT 33 22 - 37 sec   Blood culture   Result Value Ref Range    Specimen Description Blood Right Arm     Special Requests Aerobic and anaerobic bottles received     Culture Micro PENDING    Magnesium   Result Value Ref Range    Magnesium 1.3 (L) 1.6 - 2.3 mg/dL   Comprehensive metabolic panel   Result Value Ref Range    Sodium 132 (L) 133 - 144 mmol/L    Potassium 4.2 3.4 - 5.3 mmol/L    Chloride 99 94 - 109 mmol/L    Carbon Dioxide 27 20 - 32 mmol/L    Anion Gap 6 3 - 14 mmol/L    Glucose 102 (H) 70 - 99 mg/dL    Urea Nitrogen 8 7 - 30 mg/dL    Creatinine 0.65 0.52 - 1.04 mg/dL    GFR Estimate >90 >60 mL/min/[1.73_m2]    GFR Estimate If Black >90 >60 mL/min/[1.73_m2]    Calcium 7.7 (L) 8.5 - 10.1 mg/dL    Bilirubin Total 1.5 (H) 0.2 - 1.3 mg/dL    Albumin 1.1 (L) 3.4 - 5.0 g/dL    Protein Total 5.2 (L) 6.8 - 8.8 g/dL    Alkaline Phosphatase 983 (H) 40 - 150 U/L    ALT 23 0 - 50 U/L    AST 29 0 - 45 U/L   Lactic acid whole blood   Result Value Ref Range    Lactic Acid 1.0 0.7 - 2.0 mmol/L   UA reflex to Microscopic and Culture   Result Value Ref Range    Color Urine Dark Brown     Appearance Urine Cloudy     Glucose Urine Negative NEG^Negative mg/dL    Bilirubin Urine Negative NEG^Negative    Ketones Urine Negative NEG^Negative mg/dL    Specific Gravity Urine 1.020 1.003 - 1.035    Blood Urine Large (A) NEG^Negative    pH Urine 6.0 5.0 - 7.0 pH    Protein Albumin Urine 100 (A) NEG^Negative mg/dL    Urobilinogen mg/dL Normal 0.0 - 2.0 mg/dL    Nitrite Urine Negative NEG^Negative    Leukocyte Esterase Urine Moderate (A) NEG^Negative    Source Unspecified Urine     RBC Urine  >182 (H) 0 - 2 /HPF    WBC Urine 55 (H) 0 - 5 /HPF    Squamous Epithelial /HPF Urine 2 (H) 0 - 1 /HPF    Mucous Urine Present (A) NEG^Negative /LPF   Urine Culture Aerobic Bacterial   Result Value Ref Range    Specimen Description Unspecified Urine     Special Requests Specimen received in preservative     Culture Micro PENDING    XR Chest 2 Views    Narrative    EXAMINATION:  XR CHEST 2 VW 11/22/2019 7:16 PM.    COMPARISON: Radiograph 11/20/2019.    HISTORY:  dizziness    FINDINGS: PA and lateral views of the chest. The trachea is midline.  The cardiomediastinal silhouette is within normal limits. Unchanged  loculated left pleural effusion and trace right pleural effusion. No  focal airspace opacity. No pneumothorax. The upper abdomen is  unremarkable. Redemonstration of biliary drainage catheters.      Impression    IMPRESSION:   1. Unchanged loculated appearing left pleural effusion and trace right  pleural effusion.  2. No focal airspace disease.  3. No pneumothorax.    I have personally reviewed the examination and initial interpretation  and I agree with the findings.    BARBRA MAE, DO     *Note: Due to a large number of results and/or encounters for the requested time period, some results have not been displayed. A complete set of results can be found in Results Review.      No Known Allergies    Current Facility-Administered Medications   Medication     morphine (MS CONTIN) 12 hr tablet 15 mg     sodium chloride 0.9% infusion     Current Outpatient Medications   Medication     albuterol (PROAIR HFA/PROVENTIL HFA/VENTOLIN HFA) 108 (90 Base) MCG/ACT inhaler     Ascorbic Acid (VITAMIN C PO)     Calcium Carbonate-Vitamin D (CALCIUM + D PO)     ciprofloxacin (CIPRO) 500 MG tablet     cyanocobalamin (VITAMIN B12) 1000 MCG/ML injection     diphenoxylate-atropine (LOMOTIL) 2.5-0.025 MG per tablet     dronabinol (MARINOL) 2.5 MG capsule     Ferrous Sulfate 324 (65 Fe) MG TBEC     guaiFENesin (MUCINEX) 600 MG 12  hr tablet     HEMP OIL OR EXTRACT OR OTHER CBD CANNABINOID, NOT MEDICAL CANNABIS,     HERBALS     ipratropium - albuterol 0.5 mg/2.5 mg/3 mL (DUONEB) 0.5-2.5 (3) MG/3ML neb solution     LEVOTHYROXINE SODIUM PO     loperamide (IMODIUM) 2 MG capsule     LORazepam (ATIVAN) 1 MG tablet     magnesium oxide (MAG-OX) 400 MG tablet     medical cannabis (Patient's own supply)     morphine (MS CONTIN) 15 MG CR tablet     naloxone (NARCAN) 4 MG/0.1ML nasal spray     ondansetron (ZOFRAN-ODT) 4 MG ODT tab     oxyCODONE (ROXICODONE) 5 MG tablet     pantoprazole (PROTONIX) 40 MG EC tablet     polyethylene glycol (MIRALAX/GLYCOLAX) packet     potassium chloride (KLOR-CON) 20 MEQ packet     prochlorperazine (COMPAZINE) 10 MG tablet     senna (SENOKOT) 8.6 MG tablet     tamsulosin (FLOMAX) 0.4 MG capsule     VITAMIN E NATURAL PO     I have reviewed the Medications, Allergies, Past Medical and Surgical History, and Social History in the Epic system.    Review of Systems   Constitutional: Negative for chills and fever.   Respiratory: Positive for shortness of breath. Negative for cough.    Genitourinary: Negative for dysuria.   Neurological: Positive for dizziness and weakness.       Physical Exam   BP: 91/70  Pulse: 107  Heart Rate: 97  Temp: 97.8  F (36.6  C)  Resp: 18  SpO2: 98 %      Physical Exam  Constitutional:       General: She is not in acute distress.     Appearance: She is not diaphoretic.   HENT:      Head: Atraumatic.      Mouth/Throat:      Pharynx: No oropharyngeal exudate.   Eyes:      General: No scleral icterus.  Neck:      Musculoskeletal: Neck supple.   Cardiovascular:      Rate and Rhythm: Regular rhythm. Tachycardia present.      Heart sounds: Normal heart sounds.   Pulmonary:      Effort: No respiratory distress.      Breath sounds: Normal breath sounds.   Abdominal:      General: There is no distension.      Palpations: Abdomen is soft.      Tenderness: There is no abdominal tenderness.   Musculoskeletal:          General: No deformity.   Skin:     General: Skin is warm and dry.   Neurological:      Mental Status: She is alert.   Psychiatric:         Behavior: Behavior normal.         ED Course   3:17 PM  The patient was seen and examined by Dr. Quinton Jenkins in Room ED 18.        Procedures         Results for orders placed or performed during the hospital encounter of 11/22/19 (from the past 24 hour(s))   CBC with platelets differential   Result Value Ref Range    WBC 6.2 4.0 - 11.0 10e9/L    RBC Count 3.86 3.8 - 5.2 10e12/L    Hemoglobin 11.0 (L) 11.7 - 15.7 g/dL    Hematocrit 34.4 (L) 35.0 - 47.0 %    MCV 89 78 - 100 fl    MCH 28.5 26.5 - 33.0 pg    MCHC 32.0 31.5 - 36.5 g/dL    RDW 17.4 (H) 10.0 - 15.0 %    Platelet Count 330 150 - 450 10e9/L    Diff Method Automated Method     % Neutrophils 69.9 %    % Lymphocytes 13.5 %    % Monocytes 12.7 %    % Eosinophils 1.8 %    % Basophils 1.3 %    % Immature Granulocytes 0.8 %    Nucleated RBCs 0 0 /100    Absolute Neutrophil 4.3 1.6 - 8.3 10e9/L    Absolute Lymphocytes 0.8 0.8 - 5.3 10e9/L    Absolute Monocytes 0.8 0.0 - 1.3 10e9/L    Absolute Eosinophils 0.1 0.0 - 0.7 10e9/L    Absolute Basophils 0.1 0.0 - 0.2 10e9/L    Abs Immature Granulocytes 0.1 0 - 0.4 10e9/L    Absolute Nucleated RBC 0.0    INR   Result Value Ref Range    INR 1.20 (H) 0.86 - 1.14   Partial thromboplastin time   Result Value Ref Range    PTT 33 22 - 37 sec   Blood culture   Result Value Ref Range    Specimen Description Blood Right Arm     Special Requests Aerobic and anaerobic bottles received     Culture Micro PENDING    Magnesium   Result Value Ref Range    Magnesium 1.3 (L) 1.6 - 2.3 mg/dL   Comprehensive metabolic panel   Result Value Ref Range    Sodium 132 (L) 133 - 144 mmol/L    Potassium 4.2 3.4 - 5.3 mmol/L    Chloride 99 94 - 109 mmol/L    Carbon Dioxide 27 20 - 32 mmol/L    Anion Gap 6 3 - 14 mmol/L    Glucose 102 (H) 70 - 99 mg/dL    Urea Nitrogen 8 7 - 30 mg/dL    Creatinine 0.65 0.52 - 1.04  mg/dL    GFR Estimate >90 >60 mL/min/[1.73_m2]    GFR Estimate If Black >90 >60 mL/min/[1.73_m2]    Calcium 7.7 (L) 8.5 - 10.1 mg/dL    Bilirubin Total 1.5 (H) 0.2 - 1.3 mg/dL    Albumin 1.1 (L) 3.4 - 5.0 g/dL    Protein Total 5.2 (L) 6.8 - 8.8 g/dL    Alkaline Phosphatase 983 (H) 40 - 150 U/L    ALT 23 0 - 50 U/L    AST 29 0 - 45 U/L   Lactic acid whole blood   Result Value Ref Range    Lactic Acid 1.0 0.7 - 2.0 mmol/L   UA reflex to Microscopic and Culture   Result Value Ref Range    Color Urine Dark Brown     Appearance Urine Cloudy     Glucose Urine Negative NEG^Negative mg/dL    Bilirubin Urine Negative NEG^Negative    Ketones Urine Negative NEG^Negative mg/dL    Specific Gravity Urine 1.020 1.003 - 1.035    Blood Urine Large (A) NEG^Negative    pH Urine 6.0 5.0 - 7.0 pH    Protein Albumin Urine 100 (A) NEG^Negative mg/dL    Urobilinogen mg/dL Normal 0.0 - 2.0 mg/dL    Nitrite Urine Negative NEG^Negative    Leukocyte Esterase Urine Moderate (A) NEG^Negative    Source Unspecified Urine     RBC Urine >182 (H) 0 - 2 /HPF    WBC Urine 55 (H) 0 - 5 /HPF    Squamous Epithelial /HPF Urine 2 (H) 0 - 1 /HPF    Mucous Urine Present (A) NEG^Negative /LPF   Urine Culture Aerobic Bacterial   Result Value Ref Range    Specimen Description Unspecified Urine     Special Requests Specimen received in preservative     Culture Micro PENDING    XR Chest 2 Views    Narrative    EXAMINATION:  XR CHEST 2 VW 11/22/2019 7:16 PM.    COMPARISON: Radiograph 11/20/2019.    HISTORY:  dizziness    FINDINGS: PA and lateral views of the chest. The trachea is midline.  The cardiomediastinal silhouette is within normal limits. Unchanged  loculated left pleural effusion and trace right pleural effusion. No  focal airspace opacity. No pneumothorax. The upper abdomen is  unremarkable. Redemonstration of biliary drainage catheters.      Impression    IMPRESSION:   1. Unchanged loculated appearing left pleural effusion and trace right  pleural  effusion.  2. No focal airspace disease.  3. No pneumothorax.    I have personally reviewed the examination and initial interpretation  and I agree with the findings.    BARBRA MAE, DO     *Note: Due to a large number of results and/or encounters for the requested time period, some results have not been displayed. A complete set of results can be found in Results Review.     Labs Ordered and Resulted from Time of ED Arrival Up to the Time of Departure from the ED   CBC WITH PLATELETS DIFFERENTIAL - Abnormal; Notable for the following components:       Result Value    Hemoglobin 11.0 (*)     Hematocrit 34.4 (*)     RDW 17.4 (*)     All other components within normal limits   INR - Abnormal; Notable for the following components:    INR 1.20 (*)     All other components within normal limits   UA MACROSCOPIC WITH REFLEX TO MICRO AND CULTURE - Abnormal; Notable for the following components:    Blood Urine Large (*)     Protein Albumin Urine 100 (*)     Leukocyte Esterase Urine Moderate (*)     RBC Urine >182 (*)     WBC Urine 55 (*)     Squamous Epithelial /HPF Urine 2 (*)     Mucous Urine Present (*)     All other components within normal limits   MAGNESIUM - Abnormal; Notable for the following components:    Magnesium 1.3 (*)     All other components within normal limits   COMPREHENSIVE METABOLIC PANEL - Abnormal; Notable for the following components:    Sodium 132 (*)     Glucose 102 (*)     Calcium 7.7 (*)     Bilirubin Total 1.5 (*)     Albumin 1.1 (*)     Protein Total 5.2 (*)     Alkaline Phosphatase 983 (*)     All other components within normal limits   PARTIAL THROMBOPLASTIN TIME   LACTIC ACID WHOLE BLOOD   PERIPHERAL IV CATHETER   BLOOD CULTURE   URINE CULTURE AEROBIC BACTERIAL   BLOOD CULTURE     Results for orders placed or performed during the hospital encounter of 11/22/19   CBC with platelets differential     Status: Abnormal   Result Value Ref Range    WBC 6.2 4.0 - 11.0 10e9/L    RBC Count 3.86 3.8  - 5.2 10e12/L    Hemoglobin 11.0 (L) 11.7 - 15.7 g/dL    Hematocrit 34.4 (L) 35.0 - 47.0 %    MCV 89 78 - 100 fl    MCH 28.5 26.5 - 33.0 pg    MCHC 32.0 31.5 - 36.5 g/dL    RDW 17.4 (H) 10.0 - 15.0 %    Platelet Count 330 150 - 450 10e9/L    Diff Method Automated Method     % Neutrophils 69.9 %    % Lymphocytes 13.5 %    % Monocytes 12.7 %    % Eosinophils 1.8 %    % Basophils 1.3 %    % Immature Granulocytes 0.8 %    Nucleated RBCs 0 0 /100    Absolute Neutrophil 4.3 1.6 - 8.3 10e9/L    Absolute Lymphocytes 0.8 0.8 - 5.3 10e9/L    Absolute Monocytes 0.8 0.0 - 1.3 10e9/L    Absolute Eosinophils 0.1 0.0 - 0.7 10e9/L    Absolute Basophils 0.1 0.0 - 0.2 10e9/L    Abs Immature Granulocytes 0.1 0 - 0.4 10e9/L    Absolute Nucleated RBC 0.0    INR     Status: Abnormal   Result Value Ref Range    INR 1.20 (H) 0.86 - 1.14   Partial thromboplastin time     Status: None   Result Value Ref Range    PTT 33 22 - 37 sec   Lactic acid whole blood     Status: None   Result Value Ref Range    Lactic Acid 1.0 0.7 - 2.0 mmol/L   UA reflex to Microscopic and Culture     Status: Abnormal   Result Value Ref Range    Color Urine Dark Brown     Appearance Urine Cloudy     Glucose Urine Negative NEG^Negative mg/dL    Bilirubin Urine Negative NEG^Negative    Ketones Urine Negative NEG^Negative mg/dL    Specific Gravity Urine 1.020 1.003 - 1.035    Blood Urine Large (A) NEG^Negative    pH Urine 6.0 5.0 - 7.0 pH    Protein Albumin Urine 100 (A) NEG^Negative mg/dL    Urobilinogen mg/dL Normal 0.0 - 2.0 mg/dL    Nitrite Urine Negative NEG^Negative    Leukocyte Esterase Urine Moderate (A) NEG^Negative    Source Unspecified Urine     RBC Urine >182 (H) 0 - 2 /HPF    WBC Urine 55 (H) 0 - 5 /HPF    Squamous Epithelial /HPF Urine 2 (H) 0 - 1 /HPF    Mucous Urine Present (A) NEG^Negative /LPF   Magnesium     Status: Abnormal   Result Value Ref Range    Magnesium 1.3 (L) 1.6 - 2.3 mg/dL   Comprehensive metabolic panel     Status: Abnormal   Result Value  Ref Range    Sodium 132 (L) 133 - 144 mmol/L    Potassium 4.2 3.4 - 5.3 mmol/L    Chloride 99 94 - 109 mmol/L    Carbon Dioxide 27 20 - 32 mmol/L    Anion Gap 6 3 - 14 mmol/L    Glucose 102 (H) 70 - 99 mg/dL    Urea Nitrogen 8 7 - 30 mg/dL    Creatinine 0.65 0.52 - 1.04 mg/dL    GFR Estimate >90 >60 mL/min/[1.73_m2]    GFR Estimate If Black >90 >60 mL/min/[1.73_m2]    Calcium 7.7 (L) 8.5 - 10.1 mg/dL    Bilirubin Total 1.5 (H) 0.2 - 1.3 mg/dL    Albumin 1.1 (L) 3.4 - 5.0 g/dL    Protein Total 5.2 (L) 6.8 - 8.8 g/dL    Alkaline Phosphatase 983 (H) 40 - 150 U/L    ALT 23 0 - 50 U/L    AST 29 0 - 45 U/L   Blood culture     Status: None (Preliminary result)   Result Value Ref Range    Specimen Description Blood Right Arm     Special Requests Aerobic and anaerobic bottles received     Culture Micro PENDING    Urine Culture Aerobic Bacterial     Status: None (Preliminary result)   Result Value Ref Range    Specimen Description Unspecified Urine     Special Requests Specimen received in preservative     Culture Micro PENDING             Assessments & Plan (with Medical Decision Making)   59-year-old female presents to us with a chief complaint of hypotension.  Differential includes but not limited to urinary tract infection, pneumonia, dehydration, electrolyte imbalance, anemia.  Patient notes her blood pressure usually varies up to 90 systolic..  She was slightly lower than this at the clinic and triage today.  She is afebrile although slightly tachycardic at 103.  Patient felt significantly better with fluids.  Her blood pressures remained in the 80s while she is been here.  She is not anemic and has no leukocytosis.  BMP kidney function lactic acid are normal.  Patient does have leukocyte esterase and white cells and blood on her UA.  Initially gave her Rocephin for concern for new UTI.  Patient was evaluated by GYN oncology residents.  Patient does have a ureteral stent due to her cancer and has chronic similar  findings in her urine.  Gynecology resident recommended discharge as this appears to be chronic findings for her.  The last time we had a similar urine culture only grew out normal urogenital estiven.  Talked with patient about admission versus discharge home.  Patient feels back to her baseline and is requesting with discharge home.  She is already on Cipro prophylactically for her previous cholangitis.  She does want to go home and agrees to return if you develop fever or any worsening symptoms.  She does understand the risks of going home.  Her family is also comfortable with this plan was involved in the decision-making.  We will discharge her at her request.  I have reviewed the nursing notes.    I have reviewed the findings, diagnosis, plan and need for follow up with the patient.    Discharge Medication List as of 11/22/2019 10:52 PM          Final diagnoses:   Dehydration     Aden JUNIOR, am serving as a trained medical scribe to document services personally performed by Dylon Jenkins DO, based on the provider's statements to me.      Dylon JUNIOR DO, was physically present and have reviewed and verified the accuracy of this note documented by Aden Johnson.    11/22/2019   [facility], [place], EMERGENCY DEPARTMENT     Dylon Jenkins DO  11/22/19 [id number]

## 2019-11-22 NOTE — ED NOTES
Bed: ED18  Expected date:   Expected time:   Means of arrival:   Comments:  Jac  [id number]  Infectious Disease Note        Odalys Nathan is a 58 y/o woman with recurrent ovarian cancer.   I follow her in my clinic for recurrent cholangitis.  She is currently taking ciprofloxacin 500mg po daily for suppression of cholangitis.        She was presenting today to discuss whether or not we would continue this antibiotic suppression. Odalys had therapeutic thoracentesis ear  lier today to treat her recurrent pleural effusion. She presents with a BP of 70/49.   I did recheck this myself with a small adult cuff, and it was 72/48.   She presented with her  and sister.   I instructed them to go directly to our Emergency Department.   felt comfortable with driving her the two blocks to the Emergency Room, and understood my directions to go straight there and   present immediately.  I called the [facility] ED and spoke with Dr. Alexis to give this report.        Patient and I will be in touch to re-schedule our appointment.  She should continue ciprofloxacin 500mg po daily until she can be seen.        Rita Santana MD, MS  Infectious Disease       Garret Alexis MD  11/22/19 [id number]

## 2019-11-22 NOTE — PROGRESS NOTES
Consent for right thoracentesis obtained by Dr. Narayanan.  Pt tolerated 1600 ml's yellow fluid removal well.  Hypotensive but pt norm. Alert and comfortable on discharge to home with family.  Care instructions understood. Post CXR deferred by DR. Narayanan.

## 2019-11-22 NOTE — LETTER
11/22/2019      RE: Odalys Nathan  [address] [address] [address]         Infectious Disease Note      Odalys Nathan is a 60 y/o woman with recurrent ovarian cancer.   I follow her in my clinic for recurrent cholangitis.  She is currently taking ciprofloxacin 500mg po daily for suppression of cholangitis.      She was presenting today to discuss whether or not we would continue this antibiotic suppression. Odalys had therapeutic thoracentesis earlier today to treat her recurrent pleural effusion. She presents with a BP of 70/49.   I did recheck this myself with a small adult cuff, and it was 72/48.   She presented with her  and sister.   I instructed them to go directly to our Emergency Department.   felt comfortable with driving her the two blocks to the Emergency Room, and understood my directions to go straight there and present immediately.  I called the [facility] ED and spoke with Dr. Alexis to give this report.      Patient and I will be in touch to re-schedule our appointment.  She should continue ciprofloxacin 500mg po daily until she can be seen.      Rita Santana MD, MS  Infectious Disease      Time:  I spent 10 minutes in direct care with this patient, including >50% of time face-to-face with the patient counseling about my instructions to present to the ED.      Rita Santana MD

## 2019-11-22 NOTE — ED AVS SNAPSHOT
[facility], [place], Emergency Department  [address] [place] [address]  Phone:  [phone]                                    Odalys Nathan   MRN: [id number]    Department:  [facility], Emergency Department   Date of Visit:  11/22/2019           After Visit Summary Signature Page    I have received my discharge instructions, and my questions have been answered. I have discussed any challenges I see with this plan with the nurse or doctor.    ..........................................................................................................................................  Patient/Patient Representative Signature      ..........................................................................................................................................  Patient Representative Print Name and Relationship to Patient    ..................................................               ................................................  Date                                   Time    ..........................................................................................................................................  Reviewed by Signature/Title    ...................................................              ..............................................  Date                                               Time          22EPIC Rev 08/18

## 2019-11-22 NOTE — ED TRIAGE NOTES
Patient presents to triage from the clinic with hypotension. Pt had a thoracentesis done today, which she has performed every other day routinely. Hx of ovarian cancer. BP 90/70 at triage. Pt c/o weakness, no recent illness and no sick contacts.

## 2019-11-23 NOTE — CONSULTS
Gynecology Oncology Consult Note    Odalys Nathan MRN# [id number]   Age: 59 year old YOB: 1960     Date of Admission:  11/22/2019             Chief Complaint:   Hypotension         History of Present Illness:   This patient is a 59 year old female with recurrent ovarian cancer who presents to the emergency department with hypotension. She is accompanied by her  and sister, who helped with the history. Today she had a thoracentesis and an appointment with infectious disease. She reports her blood pressure was low at the thoracentesis but she was not given fluids. She went to the infectious disease appointment and the doctor told her, her blood pressure was low enough that she needed to go to the emergency department.     She was recently hospitalized from 10/28 to 10/31/2019 with biliary obstruction and cholangitis. An ERCP was performed and she was discharged home with ciprofloxacin. She has been taking the ciprofloxacin since discharge. The ID appointment today was to follow up the plan for antibiotics.  She also has a history of hydronephrosis and a right ureteral stent is in place.     Patient and her family report cycles where she will feel poorly for a couple of days and be unable to tolerate normal PO intake. After a couple of days, the episode will resolve and she will be able to eat and drink again. She reports that she was in one of these episodes and did not have much PO intake. Also had emesis yesterday. Today, after the thoracentesis she felt better and was able to eat part of a sandwich and drink some water without nausea or vomiting. Her pain is controlled with her current pain medication regimen. No fevers, chills, nausea, vomiting, chest pain, dizziness, shortness of breath, dysuria. She reports her normal blood pressure is low, with the systolic value usually in the 80-90's.             Cancer Treatment History:     1/18/2012 - Admitted to hospital for 2 weeks of  intermittent abdominal cramping, distention, diarrhea and N/V. CT of abdomen/pelvis significant for small bowel obstruction, a heterogenous soft tissue density in the pelvis, omental nodules, and ascites. Bilateral adnexal masses per U/S of pelvis. CA-125 was elevated at 987, CEA was normal at 1.0.    1/18/12 - Therapeutic paracentesis (4 L) with cytology confirming malignancy (NE positive, weak ER positive, CK-7 positive consistent with GYN primary). Surgery recommended d/t potential for falling blood counts 2/2 chemotherapy (patient is [employer] and limiting blood transfusion)    2/1/12 - Exploratory laparotomy, MAR BSO, lysis of adhesion, Appendectomy, Repair cystotomy, Omentectomy Bwhb-vuvgsb-wdgfdvuxj-transverse-colon resection, Ileo-descending colon anastomosis, CUSA, & Colonoscopy (done by Dr. Amato and colorectal team).  2/20/2012 - Admitted to hospital for bilateral pulmonary emboli and drainage of pleural effusion. Started on Lovenox.  2/28/12-3/21/12: Cycle #1-2 Carbo/Taxol IV  3/29/12 - Started on Keflex by her PCP for infection in her healing wound (immediately below her umbilicus).    4/11/12-6/14/12: Cycle #3-6 IV chemo, Cycle #1 IV/IP chemo  7/16/12 -  8, CT PRADEEP - enrolled in  - observation arm  3/4/13-6/28/13:  6, 9, 12, 15, 20.  7/1/13: CT Chest, Abdomen, Pelvis IMPRESSION:  1. Worsening metastatic ovarian carcinoma suggested by increased size of soft tissue nodules anterior to the right psoas muscle, that may represent growing mesenteric lymphadenopathy.  2. Remaining prominent right lower quadrant mesenteric lymph nodes are not significantly changed from CT 7/16/2012.  3. Clustered nodular opacities in the right lower lobe are not significant change from 7/16/2012 and remain indeterminate. Again, the appearance and distribution is suggestive of an infectious etiology.  4. Stable 8 mm soft tissue nodule in left breast, unchanged since at least 02/20/2012. This can be  observed on followup studies, but correlation with mammography could be considered.  Decision made to start Doxil/Carbo.  7/11/13: The left ventricular ejection fraction is normal at 66.4%.  7/12/13-9/6/13: Cycle #1-3 Doxil/Carbo.  10, 11.    9/30/13 CT C/A/P Impression:  1. Overall, favorable response to treatment with decreasing size of soft tissue nodules tracking along the anterior aspect of the right psoas muscle.  2. 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  3. Improved cluster of right lower lobe pulmonary nodular opacities. These may represent resolving infection.  10/3/13-12/9/13:  9, 8, 10. Cycle 4-6 Doxil/Carbo.    1/13/14 CT C/A/P Impression:   1. Stable appearance of metastatic ovarian cancer. Scattered soft tissue nodules along the anterior aspect of the right psoas muscle are unchanged in size. Mild mesenteric lymphadenopathy is unchanged.  2. Clustered micronodules in the right lower lobe are unchanged from 9/30/2013, but improved from 7/1/2013. This history suggests a postinflammatory/postinfectious etiology.  3. 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  1/16/14 Discussed multiple options for her based on relatively stable-appearing disease on CT but slight increase in  (which has had small increase to 20 with last recurrence) including chemo break with recheck of  in 1 month, starting new chemo agent immediately, and exploratory surgery with possible resection of nodules. She is considered platinum-sensitive based on > 1 year remission after Taxol/Carbo, which we will take into consideration for future chemo planning. I am not inclined to surgery at this time given difficulty she already has with diarrhea secondary to past colon resection. I suspect we would need to resect further bowel due to mesenteric disease. Also explained inherent risks of any major surgery. Also mentioned maintenance chemo, but this has not been shown to increase overall survival and  would likely decrease her quality of life without significant benefit. Family is going on vacation to [place] in 2 weeks and Odalys does not want to have chemo prior to that, so will plan to take 1 month break. She can have  at that time (discussed checking toady since last draw was in early December, but as it would likely not change treatment plan and she has h/o slow rising , will not check today).  2/17/14  16    2/20/14: Decision to take break from chemo for two months, followed by CT and CA-125.    4/21/14  27  4/21/14 CT C/A/P Impression:    1. Increased size of 2 low-attenuation lymph nodes anterior to the right psoas muscle is concerning for worsening metastatic ovarian cancer.    2. New circumferential thickening of a 3.8 cm length segment of distal transverse colon is likely physiologic. Recommend attention on followup imaging.    3. Grossly unchanged size of clustered small nodules versus scarring in the right lower lobe the lungs.    4. Stable thrombosis of the right ovarian vein.  5/14/14: Diagnostic laparoscopy converted to exploratory laparotomy and removal of mesenteric masses, tumor debulking, peritoneal biopsies and intraperitoneal port placement. On laparoscopy, it was noted that there were small nodules on the anterior abdominal wall near the previous incision, small nodules on the right pelvic sidewall as well. Nodules were palpated in the mesentery; however, as it was unable to clarify where the origin of the nodules was, the decision was made to open the patient. On opening there was found to be approximately a 3 cm nodule in the small bowel mesentery and another separate approximately 2 cm nodule in the bowel mesentery. Pelvis without evidence of cancer, some mesenteric lymph nodes were palpated. No evidence otherwise of any disseminated cancer throughout the abdomen.    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 (2/2)  -Largest metastasis: 1.3 cm  -See comment  D: Peritoneum, right paracolic gutter #2, biopsy:  -Fibroadipose tissue with granulomatous inflammation surrounding refractile material  -Negative for malignancy   E: Small bowel adhesion, biopsy:  -Fibroconnective tissue, consistent with adhesion  -Negative for malignancy  F: Lymph nodes, mesentry, not otherwise specified, excision:  -Two lymph nodes, negative for metastatic carcinoma (0/2)  G: Lymph node, mesentery, \"#2\", excision:  -One lymph node, negative for metastatic carcinoma (0/1)  H: Lymph nodes, mesentery, \"nodule #2\", excision:  -Five lymph nodes, negative for metastatic carcinoma (0/5)  COMMENT:  Some of the specimens show post-operative changes. Others show possible treatment related changes, including necrosis. The metastatic carcinoma in the mesenteric lymph nodes (specimen C) shows variable morphology, including relatively low grade tumor with papillary architecture, and high grade tumor comprised of nests of tumor cells with irregular, slit-like spaces and marked nuclear pleomorphism.    5/29/14: Cycle 1 IV PACLitaxel / IP CISplatin / IP PACLitaxel.  - 28.  6/26/14: Cycle #2 IV/IP.  10  8/5/14: CT chest/abd/pelvis IMPRESSION     1. In this patient with ovarian cancer, overall findings are indicative of stable/slight improvement, as multiple mesenteric lymphadenopathy and scattered nodular peritoneal soft tissue mass lesions appear unchanged or slightly smaller since 4/21/2014.    2. 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 Mild dilatation of the second and the third portion of the duodenum with a narrow SMA angle. This " could represent SMA syndrome, if clinically correlated.17/14:    Cycle #3 IV/IP.  10.    CT chest/abd/pelvis with contrast on 8/5/14    Impression:    1. In this patient with ovarian cancer, overall findings are indicative of stable/slight improvement, as multiple mesenteric lymphadenopathy and scattered nodular peritoneal soft tissue mass lesions appear unchanged or slightly smaller since 4/21/2014.    2. 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 Mild dilatation of the second and the third portion of the duodenum with a narrow SMA angle. This could represent SMA syndrome, if clinically correlated.  8/7/14: Cycle #4 Taxol/Carbo (changed from IV/IP).  10. She has been feeling okay. She is unsure if she can finish out the course of 6 cycles IV/IP taxol/cisplatin. She feels like she has the flu for about a week then starts feeling gradually better after each chemo cycle. Her spouse notes that she actually has been more sick with the treatments than she initially admits here. She was also previously having some rib pain. Denies any rib pain now. Denies any chest pain or shortness of breath.  Plan: discussed recent CT cap results and switching to just IV as she is feeling miserable with IP treatments. Switch to IV carbo/taxol as patient is platinum sensitive.  We also discussed her taking part of the tesaro trial, which would require BRCA testing. She would like to take part in this trial if eligible.  8/20/14: Remove Intraperitoneal Port ( Port and catheter intact - discarded)  8/28/14: Cycle #5 Taxol/Carbo held due to thrombocytopenia.  6.    She denies any vaginal bleeding, no changes in her bowel or bladder habits, no nausea/emesis, no lower extremity edema, and no difficulties eating or sleeping. She denies any abdominal discomfort/bloating, no fevers or chills, and no chest pain or shortness of breath. She states her diarrhea is the same. She reports some fatigue which improves  about 1-2 weeks after her chemotherapy. She states she does not need any medication refills and she was told she does not meet the criteria for the TESARO trial. She states she has 3 bags of iv fluids left over from her previous chemotherapy and will give these to herself. She states she is ready for her treatment today.    9/29/14: Cycle #6 Taxol/Carbo  6. Insurance questions regarding GSF coverage today. No concerns other than fatigue. Taking iron for anemia and does not desire blood transfusion. Using neulasta for neutropenia. Using home IV hydration if needed. Baseline unchanged. No abdominal bloating, constipation, diarrhea, pain, vaginal or rectal bleeding, cough or dyspnea, fluid retention.    10/16/14: Impression:    1. Nodular peritoneal soft tissue mass in the right lower quadrant adjacent to the psoas muscle is no longer appreciated. Adjacent prominent lymphadenopathy is unchanged from previous exam. No new peritoneal lesions.    2. 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     10/20/14:  5. CT chest/abdomen/pelvis on 10/16/14 showed nodular peritoneal soft tissue mass in the right lower quadrant adjacent to the psoas muscle is no longer appreciated. Adjacent prominent lymphadenopathy is unchanged from previous exam. 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  1/27/15:  6.  4/28/15:  14.  5/26/15:  18.  6/2/15: CT cap Impression:  1. Postsurgical changes of hysterectomy and bilateral salpingo-oophorectomy for ovarian cancer. There is a new 8 mm hazy, ill-defined hypoattenuating lesion in hepatic segment 6 which is suspicious for a metastatic deposit. Further evaluation with ultrasound in recommended.    2. Increased size of a left retroperitoneal lymph node which is indeterminate but may represent a ciro metastasis. Mildly prominent lymph nodes in the right lower quadrant are not significantly changed.  3. Moderate  colonic stool burden.    6/4/15: US abdomen IMPRESSION:    Hyperechoic lesion in the right hepatic lobe, consistent with hemangioma. This does not corresponds to the area of the lesion seen on CT from 6/2/2015. An MR would be helpful for identifying and characterizing the lesion from the recent CT.  6/12/15: MR abd IMPRESSION:  1. New 20 x 11 mm enhancing lesions between the right obliques, concerning for metastatic disease. This lesions should be amenable to percutaneous biopsy, if indicated.  2. Correlating to the lesion visualized on comparison CT is a hepatic segment 6 subcapsular 7 mm lesion. Overall the appearance favors the diagnosis of a simple cyst. However, there is faint suggestion of mild peripheral arterial enhancement. Although this is favored as  artifactual, this should be followed up to confirm stability. Recommend 6 month followup.  3. Hepatic segment 6, 5 mm lesion too small to technically characterize. Differential would favor FNH, less likely flash filling hemangioma. Recommend attention on followup.  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      9/1/15: Cycle #1 Avastin/Cytoxan.   31.      9/24/15:feeling generally well. She says she has been having back and stomach spasms. She is taking cytosine daily (she ran out yesterday). She also says its affecting her voice. Admits that it burns occasionally. She is eating and drinking normal. She also admit diarrhea, 5-7 times daily, lose/watery. She trying to stay hydrated and eat fiber. She also says that her body is sore, especially the bottom of her feet. Her blood pressure is normal. She also admits having headache after her first infusion.       9/24/15: Cycle #2 Avastin/Cytoxan.  19.  10/15/15: Cycle #3 Avastin/Cytoxan.  16.      11/6/15: CT c/a/p IMPRESSION:    1. Stable postoperative change of  MAR/BSO for ovarian cancer.  2. The lesion in the right flank abdominal musculature is slightly decreased in size. Otherwise, stable examination.  2. No evidence of metastatic disease in the chest.     11/20/15: Treatment planning visit,  16     11/25/15: surgical pathology report  FINAL DIAGNOSIS:  Soft tissue, right oblique muscle mass, excision:  -Recurrent ovarian serous carcinoma  -Carcinoma is present less than 1 mm from one resection margin  -Background skeletal muscle and fibroadipose tissue     1/4/16:  22  1/11/16-1/27/16: Radiation to right flank x 12 treatments  4/7/2016:  94. CT cap IMPRESSION:    In this patient with ovarian cancer status post MAR/BSO and descending/transverse colectomy:  1. No evidence for malignancy in the chest, abdomen, or pelvis.  2. Stable small hypodense segment 6 liver lesion, appears more likely benign, possibly a cyst.  5/13/16:  124.  6/3/16: PET CT IMPRESSION: In this patient with a history of ovarian cancer:  1. Hypermetabolic and enlarging periaortic and perihepatic lymphadenopathy compatible with metastatic disease, as detailed above.  2. Although hypodense lesion in hepatic segment 6 has been present since 6/2/2015 associated hypermetabolism makes this lesion highly concerning for metastatic disease.     Plan: to start Niraparib under TESARO study.      6/9/16:  137.  6/14/16: Cycle #1 Niraparib.    6/28/16: Cycle #1 D15 Niraparib.    7/5/16:  100.    7/11/16: Cycle #2 Nraparib.   83.     8/3/2016: PET CT IMPRESSION:    In this patient with known history of ovarian cancer:  1) New pleural based nodular opacities in the lateral and inferior aspects of the bilateral lower lobes, worse in the left lung. Likely infection. Close follow up is recommended.      2) Slight decrease in hypermetabolic abdominal lymphadenopathy. 2 hypermetabolic lymph nodes persist.  3) Unchanged right hepatic lobe metastatic lesion.      8/9/16: Cycle #3  Niraparib.  69.  9/6/16: Cycle #4 Niraparib.  53. Dose held due to anemia.  9/13/16: Eval for potential cycle 4 niraparib. Dose held due to anemia.  10/4/16: CT CAP impression:  IMPRESSION: In this patient with a known history of ovarian cancer:  1. There has been interval resolution of pleural-based nodular  opacities which likely represented infection.  2. Abdominal lymphadenopathy in the form of 2 portacaval lymph nodes  have not significantly changed in size, noted to be hypermetabolic on  prior PET/CT.  3. Previously demonstrated metastatic lesion in the right lobe of the  liver is not significantly changed.  10/11/16:  60  11/1/16: C6 niraparib,  75  11/29/16: C7 niraparib.  78. CT CAP impression as follows:  Target lesions (RECIST criteria):       A previously described target lesion superior to the head of the  pancreas (series 2, image 64)  (referred to as a perihepatic node on  6/3/2016) may not be a valid target lesion because it measured less  than 1.5 cm originally. However, this particular node has decreased in  size, now measuring 7 mm in short axis versus 14 mm on 6/3/2016 when  measured in a similar fashion.       2.3 cm short axis portacaval lymph node on series 2 image 67,  previously 2.0 cm on 10/4/2016.       1.2 cm subtle hypodensity in hepatic segment 6 on series 2 image 75,  stable on multiple studies since at least 6/3/2016     Sum of diameters today: 3.5 cm. Sum of diameters 10/4/2016: 3.2 cm.  Growth = 9%.    12/27/16: C8 niraparib.  105.  1/25/17: C9 niraparib.  108.  2/23/17: C10 niraparib.  132.   CT CAP impression:  Sum of target lesion diameters today: 3.7 cm. Sum of target lesion  diameters on 11/28/2016: 3.5 cm. Growth= 6%  1. In this patient with history of ovarian cancer there is stable  disease by RECIST criteria as evidenced by:   1a. Mildly increased size of liver metastasis.  1b. Stable portacaval lymphadenopathy.  1c. No  evidence of metastatic disease in the chest.  2. Trace emphysematous changes of the lungs.  3/22/17: C11 niraparib.  132.  4/19/17: C12 niraparib.  127.  5/16/17: CT CAP IMPRESSION: In this patient with ovarian cancer:  1. Mildly increased size of hepatic metastasis segment 6 with subtle increased capsular retraction.  2. Stable edmundo hepatis nodes, with mild increase in size of periaortic lymph nodes.  3. Prominent left supraclavicular lymph node with subtle increase in size compared to prior studies, particularly comparing to 10/4/2016.  4. Mild subtle groundglass opacities in the right upper lobe, not present on prior study, lesser extent in the right lower lobe.  Findings may represent infection, additional consideration is malignancy (less likely), and attention on follow-up study  Recommended.  Addendum:   Prominent left supraclavicular lymph node (3/25) is stable from most recent CT performed 2/20/2017, currently measuring 14 x 16 mm, previously 14 x 16 mm on 2/20/2017.      The portal caval lymph node/edmundo hepatis lymph node is stable from 2/20/2017, measuring 21 mm.      Clarification of size of the para-aortic lymph node (series 3 image 327). It short axis measurement is 11 mm versus 9 mm on prior study.      Hepatic segment 6 triangular-shaped low density lesion (3/362) measures 14 mm, previously measured 12 mm, demonstrating a  possible/questionable minimal subtle increase in size. Similarly the lymph nodes noted above in the supraclavicular and para-aortic regions demonstrate possible minimal possible subtle increase in size.      5/18/17: Cycle #13 Niraparib.  191.  6/15/17: Cycle #14 Niraparib.  146.  7/13/17: Cycle #15 Niraparib 200 mg.  171     CT (8/9/17):       IMPRESSION:  1. Segment 6 hepatic metastasis is stable to minimally increased in size.  2. Slight increase in multicentric adenopathy, most pronounced at the edmundo hepatis. Additional sites include the  inferior left neck/supraclavicular region and retroperitoneum which appear stable to  minimally increased.      8/10/17:  175  9/14/17: MUGA LVEF 54%  9/22/17: C1D1 carboplatin/Doxil.  187.  10/19/17: C2D1 carboplatin/Doxil.  108.  11/17/17: C3D1 carboplatin/Doxil.  82.  12/14/17: C4D1 carboplatin/Doxil/avastin.  83.  Of note, avastin held on C4D14  1/12/18: C5D1 carboplatin/Doxil/avastin.  80.  1/26/18: platelets 61, patient was not given avastin due to being jehova's witness.   2/9/18: C6D1 carboplatin/Doxil/Avastin.   71.  3/2/18:  49. Three month treatment break.  6/20/2018:  170. CT CAP:  IMPRESSION: In this patient with history of ovarian cancer:  1. Increased size of a right hepatic lobe lesion and numerous edmundo hepatis and retroperitoneal lymph nodes compatible with progression of metastatic disease.  2. New mild intrahepatic biliary ductal dilatation, periportal edema, and pericholecystic fluid. Increased soft tissue fullness in the edmundo hepatis in the expected location of the common hepatic duct. Findings  are suspicious for developing biliary ductal obstruction secondary to worsening metastatic disease in the edmundo hepatis. Correlation with liver function tests is recommended. Right upper quadrant ultrasound  may be beneficial.  3. Unchanged left supraclavicular and right hilar lymphadenopathy in the chest.      8/3/18: C1D1 weekly paclitaxel.  not done.  9/20/18: C2D1 weekly paclitaxel 80mg/m2. Ca 125-56  11/8/2018: C3D1 weekly paclitaxel;  26     12/17/18: Ct cap IMPRESSION:  1. New area of lobulated hypodense nodularity at the far-inferior right liver. This raises the possibility of a new area of hepatic metastasis.  2. Conversely, other nodules within the right liver are smaller suggesting improvement.  3. Improved adenopathy at the abdominal retroperitoneum. Improved left  supraclavicular adenopathy. Stable mildly prominent right  hilar lymph nodes.  4. Previously noted ill-defined soft tissue at the edmundo hepatis region is still present but appears less prominent in size.  5. New finding of segmental wall thickening of the proximal sigmoid colon with some fluid distention of the adjacent colon. This could represent a segmental colitis. Other etiologies not excluded.     12/20/18:   19.  1/22/19:  45.  3/20/19: CT cap IMPRESSION:  1. Progression of disease with increasing size of a right inferior hepatic mass consistent with metastatic disease.  2. Increasing ill-defined multifocal regions of soft tissue at the edmundo hepatis consistent with malignant adenopathy versus malignant implants. Associated increased intrahepatic biliary ductal dilatation.  3. Other areas of progressive adenopathy noted at the left neck base, mediastinum, and abdominal retroperitoneum.  4. New area of carcinomatosis medial to stomach at the left upper abdomen.     3/20/19: Cycle #1 weekly paclitaxel.   180.  5/7/19: Cycle #2 weekly paclitaxel.    142.  6/20/19: Cycle #3 weekly paclitaxel/  259.      7/11/19: CT CAP-IMPRESSION:  1. Slight increased size of the left supraclavicular lymph node.  Slight progression of the mediastinal lymph nodes is also evident. New  periesophageal node as described above. Retroperitoneal nodes are  stable if not slightly smaller. Some of these nodes appear to have  partially calcified suggesting a response to interval therapy.  2. Interval decreased size of the inferior right hepatic lobe lesion  now with an area of central calcification or enhancement. No new liver  lesions. Remaining abdominal organs are grossly unremarkable. No  significant hydronephrosis.  3. Postop changes involving the bowel and pelvic region. No recurrent  pelvic mass.     8/8/19: C1 gemcitabine 800mg/m2 IV days 1&8.  446.     8/23/19: Endoscopic Retrograde Cholangiopancreatogram with 4 mm Hurricane Balloon Dilation, gallbladder  stent and Bile Duct stent placements, biliary Sphincterotomy with Dr. Lloyd. Was to move ERCP to 9/12 but readmitted to hospital         8/29/19: C2 gemcitabine 800mg/m2 IV days 1&8.  548.     10/17/2019-10/20/2019: - Electrolyte abnormalities (Hyponatremia, Hypokalemia, Hypomagnesemia) s/p repletion  - Bilateral pleural effusion, s/p right thoracentesis  - Recurrent ovarian cancer with mets to the liver and brain  - Retroperitoneal and mesenteric adenopathy  - Failure to thrive   - Severe malnutrition     Recurrent ovarian cancer in the setting of recent resection of brain mets and gamma knife radiation     10/24/2019-10/26/2019: hospital admission-[facility]  1.  Shortness of breath due to recurrent malignant right pleural effusion; s/p thoracentesis.  1.45 liters removed.     2.  Hypokalemia, acute on chronic.  Replaced per potassium replacement protocol.     3.  Hypomagnesemia, acute on chronic.  Replaced per Magnesium replacement protocol.     4.  Hypophosphatemia, acute on chronic.  Replaced per Phosphorous replacement protocol.      5.  Mildly elevated troponin level.  Suspect demand ischemia from large right pleural effusion and respiratory difficulty. Echo showed normal EF and no WMA.      6.  Metastatic ovarian cancer (mets to liver and brain).       7.  Biliary stent in place with plans to convert to metal stent in near future.     8. Ureter stent in place with some hematuria.      9.  Recent klebsiella bacteremia of unclear source; on prolonged course of Augmentin.     10.  Chronic pain syndrome managed with chronic opiate therapy.     11.  Hypothyroidism.       12.  GERD.       10/28/2019: Admit to hospital  Discharge Dx:  1) Recurrent ovarian cancer, progressing disease  2) Biliary obstruction/Cholangitis  3) Recurrent pleural effusions  4) Failure to thrive  5) Hyponatremia, resolved  6) Hypomagnesemia, resolved  7) Hypophosphatemia, resolved  8) Asymmetric RLE edema  9) Elevated LFTs and bili  10)  Anemia of chronic disease  11) Severe Malnutrition     10/29/2019: Procedure:           ERCP   Impression:          - Well positioned transpapillary gallbladder stent left                        in situ and not manipulated                        - Well positioned left primary stent removed from a                        widely patent biliary sphincterotomy                        - Synchronous malignant stenoses of the bifurcation and                        common duct with impressive intrahepatic (left more so                        than right) dilation                        - Evidence of cholangitis in the form of pus                        - Successful stent placement deep to the right and left                        intrahepatics with excellent drainage achieved            Past Medical History:     Past Medical History:   Diagnosis Date     Antiplatelet or antithrombotic long-term use      Ascites      Blood clot in the legs      Diabetes (H)      Ovarian cancer (H)     serous,stg IV     Pleural effusion      Pulmonary embolism (H) 2/2012     Refusal of blood transfusions as patient is [employer]      Short gut syndrome      Subclinical hypothyroidism 4/18/2013     Thrombosis of leg               Past Surgical History:      Past Surgical History:   Procedure Laterality Date     COLECTOMY       COLONOSCOPY  2/1/2012    Procedure:COLONOSCOPY; With Biopsy; Surgeon:TONI SULLIVAN; Location:[facility] OR     CYSTOSCOPY, RETROGRADES, INSERT STENT URETER(S), COMBINED Right 10/4/2019    Procedure: CYSTOSCOPY, WITH RETROGRADE PYELOGRAM AND URETERAL STENT INSERTION;  Surgeon: Wolf Gan MD;  [facility]     ENDOSCOPIC RETROGRADE CHOLANGIOPANCREATOGRAM N/A 8/23/2019    Procedure: Endoscopic Retrograde Cholangiopancreatogram with 4 mm Hurricane Balloon Dilation, gallbladder stent and Bile Duct stent placements, Bilarry Sphincterotomy ;  Surgeon: Catrachito Lloyd MD;  Location: [facility]     ENDOSCOPIC RETROGRADE  CHOLANGIOPANCREATOGRAM N/A 10/29/2019    Procedure: ENDOSCOPIC RETROGRADE CHOLANGIOPANCREATOGRAPH WITH BILARY STENT EXCHANGE, DEBRIDE AND STONE REMOVAL ;  Surgeon: Stephen Londono MD;  Location: [facility]     HYSTERECTOMY TOTAL ABD, RHIANNA SALPINGO-OOPHORECTOMY, NODE DISSECTION, TUMOR DEBULKING, COMBINED  2/1/2012    Procedure:COMBINED HYSTERECTOMY TOTAL ABDOMINAL, BILATERAL SALPINGO-OOPHORECTOMY, NODE DISSECTION, TUMOR DEBULKING;  Exploratory Laparotomy, Total Abdominal Hysterectomy, Bilateral Salpingo-Oophorectomy, appendectomy,lysis of adhesions, ileal, ascending, transverse and splenic flexure resection, ileal descending bowel renanastomosis, incidental cystotomy repair, CUSA procedure and colonoscopy ; [facility]     INSERT PORT PERITONEAL ACCESS  4/3/2012    Procedure:INSERT PORT PERITONEAL ACCESS; Intraperitoneal Port Placement (c-arm); Surgeon:SAMUEL CARRASCO; Location:[facility]     INSERT PORT PERITONEAL ACCESS  5/14/2014    Procedure: INSERT PORT PERITONEAL ACCESS;  Surgeon: Nga Yeung MD;  Location: [facility]     INSERT PORT VASCULAR ACCESS       IR PORT REMOVAL RIGHT  9/20/2019     LAPAROSCOPY DIAGNOSTIC (GYN)  5/14/2014    Procedure: LAPAROSCOPY DIAGNOSTIC (GYN);  Surgeon: Nga Yeung MD;  Location: [facility]     LAPAROTOMY EXPLORATORY Right 11/25/2015    Procedure: LAPAROTOMY EXPLORATORY;  Surgeon: Nga Yeung MD;  [facility]     LAPAROTOMY, TUMOR DEBULKING, COMBINED  5/14/2014    Procedure: COMBINED LAPAROTOMY, TUMOR DEBULKING;  Surgeon: Nga Yeung MD;  [facility]: [facility]     OPTICAL TRACKING SYSTEM CRANIOTOMY, EXCISE TUMOR, COMBINED Right 9/18/2019    Procedure: Stealth Assisted Right Craniotomy And Tumor Resection;  Surgeon: Bertin Dewey MD;  Location: [facility]     PICC INSERTION Left 09/20/2019    5Fr - 46cm (4cm external), basilic vein, low SVC     REMOVE CATHETER PERITONEAL N/A 8/20/2014    Procedure: REMOVE CATHETER PERITONEAL;  Surgeon: Nga Yeung  MD;  Location: [facility]     VASCULAR SURGERY      stent left iliac vein            Social History:     Social History     Tobacco Use     Smoking status: Former Smoker     Packs/day: 0.00     Years: 8.00     Pack years: 0.00     Types: Cigarettes     Last attempt to quit: 1980     Years since quittin.4     Smokeless tobacco: Never Used     Tobacco comment: started at 11 yo and quit at 16 yo   Substance Use Topics     Alcohol use: Yes     Comment: 3x/day wine or jodi            Family History:     Family History   Problem Relation Age of Onset     Cancer Mother 69        lung, smoker     Cancer Maternal Uncle 65        brain     Colon Cancer Maternal Aunt 80        colon            Allergies:     No Known Allergies         Medications:     Current Facility-Administered Medications   Medication     morphine (MS CONTIN) 12 hr tablet 15 mg     sodium chloride 0.9% infusion     Current Outpatient Medications   Medication Sig     albuterol (PROAIR HFA/PROVENTIL HFA/VENTOLIN HFA) 108 (90 Base) MCG/ACT inhaler Inhale 2 puffs into the lungs every 6 hours as needed for shortness of breath / dyspnea or wheezing     Ascorbic Acid (VITAMIN C PO) Take 500 mg by mouth daily      Calcium Carbonate-Vitamin D (CALCIUM + D PO) Take 1 tablet by mouth daily.     ciprofloxacin (CIPRO) 500 MG tablet Take 1 tablet (500 mg) by mouth every 24 hours Follow up with ID on  for refill     cyanocobalamin (VITAMIN B12) 1000 MCG/ML injection Inject 1 mL (1,000 mcg) into the muscle every 30 days     diphenoxylate-atropine (LOMOTIL) 2.5-0.025 MG per tablet Take 2 tablets by mouth 4 times daily as needed for diarrhea     dronabinol (MARINOL) 2.5 MG capsule Take 1 capsule (2.5 mg) by mouth 2 times daily (before meals)     Ferrous Sulfate 324 (65 Fe) MG TBEC TAKE ONE TABLET BY MOUTH THREE TIMES DAILY WITH MEALS. TAKE WITH A SMALL AMOUNT OF ORANGE JUICE. DO NOT TAKE WITH CALCIUM     guaiFENesin (MUCINEX) 600 MG 12 hr tablet Take 1 tablet  (600 mg) by mouth 2 times daily     HEMP OIL OR EXTRACT OR OTHER CBD CANNABINOID, NOT MEDICAL CANNABIS,      HERBALS daily      ipratropium - albuterol 0.5 mg/2.5 mg/3 mL (DUONEB) 0.5-2.5 (3) MG/3ML neb solution Take 1 vial (3 mLs) by nebulization every 6 hours as needed for wheezing or shortness of breath / dyspnea     LEVOTHYROXINE SODIUM PO Take 50 mcg by mouth daily      loperamide (IMODIUM) 2 MG capsule Take 2 mg by mouth 4 times daily as needed for diarrhea     LORazepam (ATIVAN) 1 MG tablet Take 1 tablet (1 mg) by mouth every 6 hours as needed (Anxiety, Nausea/Vomiting or Sleep)     magnesium oxide (MAG-OX) 400 MG tablet Take 1 tablet (400 mg) by mouth 2 times daily     medical cannabis (Patient's own supply) (The purpose of this order is to document that the patient reports taking medical cannabis.  This is not a prescription, and is not used to certify that the patient has a qualifying medical condition.)     morphine (MS CONTIN) 15 MG CR tablet Take 1 tablet (15 mg) by mouth every 12 hours     naloxone (NARCAN) 4 MG/0.1ML nasal spray Spray 1 spray (4 mg) into one nostril alternating nostrils once as needed for opioid reversal Every 2-3 minutes until patient responsive or EMS arrives (Patient not taking: Reported on 11/7/2019)     ondansetron (ZOFRAN-ODT) 4 MG ODT tab Take 1 tablet (4 mg) by mouth every 6 hours as needed for nausea or vomiting     oxyCODONE (ROXICODONE) 5 MG tablet Take 1 tablet (5 mg) by mouth every 6 hours as needed for severe pain     pantoprazole (PROTONIX) 40 MG EC tablet Take 40 mg by mouth daily as needed for heartburn     polyethylene glycol (MIRALAX/GLYCOLAX) packet Take 1 packet by mouth daily     potassium chloride (KLOR-CON) 20 MEQ packet Take 20 mEq by mouth daily     prochlorperazine (COMPAZINE) 10 MG tablet Take 1 tablet (10 mg) by mouth every 6 hours as needed for nausea or vomiting     senna (SENOKOT) 8.6 MG tablet Take 1 tablet by mouth 2 times daily as needed for  constipation     tamsulosin (FLOMAX) 0.4 MG capsule Take 1 capsule (0.4 mg) by mouth daily For stent discomfort     VITAMIN E NATURAL PO Take 100 Units by mouth daily            Review of Systems:   A 14 point review of systems was completed and was negative except for points mentioned in the HPI.          Physical Exam:     Vitals:    11/22/19 2100 11/22/19 2130 11/22/19 2200 11/22/19 2230   BP: (!) 82/58 (!) 89/64 (!) 89/57 (!) 83/55   Pulse: 101 101 98 99   Resp:   17 20   Temp:       TempSrc:       SpO2: 96% 98% 90% 94%     General: Resting comfortably, no acute distress, answers questions appropriately   CV: Well perfused, RRR, no murmur  Resp: Clear to ascultation, no increased work of breathing  Abdomen: Soft, nontender, nondistended  : deferred  Extremities: trace LE edema bilaterally          Labs and Imaging:     Results for orders placed or performed during the hospital encounter of 11/22/19 (from the past 24 hour(s))   CBC with platelets differential   Result Value Ref Range    WBC 6.2 4.0 - 11.0 10e9/L    RBC Count 3.86 3.8 - 5.2 10e12/L    Hemoglobin 11.0 (L) 11.7 - 15.7 g/dL    Hematocrit 34.4 (L) 35.0 - 47.0 %    MCV 89 78 - 100 fl    MCH 28.5 26.5 - 33.0 pg    MCHC 32.0 31.5 - 36.5 g/dL    RDW 17.4 (H) 10.0 - 15.0 %    Platelet Count 330 150 - 450 10e9/L    Diff Method Automated Method     % Neutrophils 69.9 %    % Lymphocytes 13.5 %    % Monocytes 12.7 %    % Eosinophils 1.8 %    % Basophils 1.3 %    % Immature Granulocytes 0.8 %    Nucleated RBCs 0 0 /100    Absolute Neutrophil 4.3 1.6 - 8.3 10e9/L    Absolute Lymphocytes 0.8 0.8 - 5.3 10e9/L    Absolute Monocytes 0.8 0.0 - 1.3 10e9/L    Absolute Eosinophils 0.1 0.0 - 0.7 10e9/L    Absolute Basophils 0.1 0.0 - 0.2 10e9/L    Abs Immature Granulocytes 0.1 0 - 0.4 10e9/L    Absolute Nucleated RBC 0.0    INR   Result Value Ref Range    INR 1.20 (H) 0.86 - 1.14   Partial thromboplastin time   Result Value Ref Range    PTT 33 22 - 37 sec   Blood  culture   Result Value Ref Range    Specimen Description Blood Right Arm     Special Requests Aerobic and anaerobic bottles received     Culture Micro PENDING    Magnesium   Result Value Ref Range    Magnesium 1.3 (L) 1.6 - 2.3 mg/dL   Comprehensive metabolic panel   Result Value Ref Range    Sodium 132 (L) 133 - 144 mmol/L    Potassium 4.2 3.4 - 5.3 mmol/L    Chloride 99 94 - 109 mmol/L    Carbon Dioxide 27 20 - 32 mmol/L    Anion Gap 6 3 - 14 mmol/L    Glucose 102 (H) 70 - 99 mg/dL    Urea Nitrogen 8 7 - 30 mg/dL    Creatinine 0.65 0.52 - 1.04 mg/dL    GFR Estimate >90 >60 mL/min/[1.73_m2]    GFR Estimate If Black >90 >60 mL/min/[1.73_m2]    Calcium 7.7 (L) 8.5 - 10.1 mg/dL    Bilirubin Total 1.5 (H) 0.2 - 1.3 mg/dL    Albumin 1.1 (L) 3.4 - 5.0 g/dL    Protein Total 5.2 (L) 6.8 - 8.8 g/dL    Alkaline Phosphatase 983 (H) 40 - 150 U/L    ALT 23 0 - 50 U/L    AST 29 0 - 45 U/L   Lactic acid whole blood   Result Value Ref Range    Lactic Acid 1.0 0.7 - 2.0 mmol/L   UA reflex to Microscopic and Culture   Result Value Ref Range    Color Urine Dark Brown     Appearance Urine Cloudy     Glucose Urine Negative NEG^Negative mg/dL    Bilirubin Urine Negative NEG^Negative    Ketones Urine Negative NEG^Negative mg/dL    Specific Gravity Urine 1.020 1.003 - 1.035    Blood Urine Large (A) NEG^Negative    pH Urine 6.0 5.0 - 7.0 pH    Protein Albumin Urine 100 (A) NEG^Negative mg/dL    Urobilinogen mg/dL Normal 0.0 - 2.0 mg/dL    Nitrite Urine Negative NEG^Negative    Leukocyte Esterase Urine Moderate (A) NEG^Negative    Source Unspecified Urine     RBC Urine >182 (H) 0 - 2 /HPF    WBC Urine 55 (H) 0 - 5 /HPF    Squamous Epithelial /HPF Urine 2 (H) 0 - 1 /HPF    Mucous Urine Present (A) NEG^Negative /LPF   Urine Culture Aerobic Bacterial   Result Value Ref Range    Specimen Description Unspecified Urine     Special Requests Specimen received in preservative     Culture Micro PENDING    XR Chest 2 Views    Narrative    EXAMINATION:   XR CHEST 2 VW 11/22/2019 7:16 PM.    COMPARISON: Radiograph 11/20/2019.    HISTORY:  dizziness    FINDINGS: PA and lateral views of the chest. The trachea is midline.  The cardiomediastinal silhouette is within normal limits. Unchanged  loculated left pleural effusion and trace right pleural effusion. No  focal airspace opacity. No pneumothorax. The upper abdomen is  unremarkable. Redemonstration of biliary drainage catheters.      Impression    IMPRESSION:   1. Unchanged loculated appearing left pleural effusion and trace right  pleural effusion.  2. No focal airspace disease.  3. No pneumothorax.    I have personally reviewed the examination and initial interpretation  and I agree with the findings.    BARBRA MAE, DO     *Note: Due to a large number of results and/or encounters for the requested time period, some results have not been displayed. A complete set of results can be found in Results Review.             Assessment and Plan:   Assessment: Odalys Nathan is a 59 year old with recurrent and progressing ovarian cancer who presented to the emergency department with hypotension. While in the ED she received 2L of normal saline. Infectious work up was reassuring. She is afebrile and WBC and lactic acid are normal. UA showed blood, protein, moderate LE and 55 WBC. These findings are likely due to the right ureteral stent that is in place. Additionally, she has been taking ciprofloxacin for cholangitis prophylaxis since discharge on 10/31/2019 and it is reasonable to suspect ciprofloxacin would also act as prophylaxis for UTIs. She does not dysuria or suprapubic pain. Given this clinical picture there is low suspicion for a urinary tract infection at this time. A urine culture is pending. If this returns positive, will initiate treatment for a UTI. Blood cultures were also collected and are pending. If these return positive, will contact patient and begin treatment for infection.   The CMP showed elevated  bilirubin and alkaline phosphatase. The bilirubin level, while elevated, is decreased from discharge on 10/31/19. The alkaline phosphatase is similar to level at time of discharge on 10/31/19. LFTs were normal. Her electrolytes were normal with the exception of Na which was 132. This is slightly below the normal range and will likely correct with PO intake and the normal saline she received in the emergency department.   Overall, the patient's clinical picture is reassuring now. Her blood pressure is at baseline and she is feeling better after receiving fluids. She tolerated PO intake earlier today without nausea or vomiting. There is low suspicion for infection at this time. She strongly desires to go home and reports she has good support at home from her  and sister. She is never alone and someone is always present to assist her.  Discussed that she has clinically improved and can discharge to home. She should return to the emergency department with any concerns, including dizziness, chest pain, SOB, fevers/chills, unable to tolerate PO intake. Patient expressed desire for IV fluids at home, encouraged her to contact the clinic to see if this could be arranged. Odalys reports she has the contact information for [facility] in the clinic and the clinic phone number. Her next appointment with Dr. Yeung is on 12/5/2019 and her next appointment for a thoracentesis is on 11/25/2019.     Discussed with Erma Jernigan MD Fellow.     Nidia Brooks MD PhD  Ob/Gyn PGY-4  11/22/2019 11:18 PM

## 2019-11-23 NOTE — DISCHARGE INSTRUCTIONS
Follow-up with your oncology team.  Return the emergency department if you develop any fever, dizziness, or other new symptoms.

## 2019-11-25 NOTE — PROCEDURES
[facility]    Procedure: Thoracentesis  Date/Time: 11/25/2019 2:44 PM  Performed by: Cici Ayala PA-C  Authorized by: Cici Ayala PA-C     UNIVERSAL PROTOCOL   Site Marked: Yes  Prior Images Obtained and Reviewed:  Yes  Required items: Required blood products, implants, devices and special equipment available    Patient identity confirmed:  Verbally with patient  NA - No sedation, light sedation, or local anesthesia  Confirmation Checklist:  Patient's identity using two indicators, relevant allergies, procedure was appropriate and matched the consent or emergent situation and correct equipment/implants were available  Time out: Immediately prior to the procedure a time out was called    Preparation: Patient was prepped and draped in usual sterile fashion       ANESTHESIA    Anesthesia: Local infiltration  Local Anesthetic:  Lidocaine 1% without epinephrine  Anesthetic Total (mL):  10      SEDATION    Patient Sedated: No    See dictated procedure note for full details.  PROCEDURE   Patient Tolerance:  Patient tolerated the procedure well with no immediate complications  Describe Procedure: Thoracentesis, straw colored fluid  Length of time physician/provider present for 1:1 monitoring during sedation: 0

## 2019-11-25 NOTE — PROGRESS NOTES
"Right thoracentesis completed per Cici JOHNSON without difficulty for 1500 ml clear shola fluid, patient tolerated well. All discharge criteria were met, blood pressure was low both pre and post procedure, patient stated she \"feels fine\" and was discharged to home via wheelchair with spouse in stable condition.  "

## 2019-11-26 NOTE — TELEPHONE ENCOUNTER
RN Recommendations/Instructions per [place] ED lab result protocol  10:48AM: Patient notified of ED provider's recommendations and that her urologist would be notified of the result when he is in the office next.  Rx for Nitrofurantoin Macrocrystal-Monohydrate (Macrobid) 100 mg PO capsule, 1 capsule (100 mg) by mouth 2 times daily for 5 days sent to [Pharmacy - [facility] in [place]].  Patient to continue taking her daily Cipro.   Advised to return to ED or contact PCP if symptoms worsen, change or has other questions or concerns.   Patient is comfortable with the information given and has no further questions.        Please Contact your PCP clinic or return to the Emergency department if your:    Symptoms worsen or other concerning symptom's.    PCP follow-up Questions asked: YES       [RN Name]  Airam Ritter RN  [employer] RN  Lung Nodule and ED Lab Result RN  Epic pool (ED late result f/u RN): [id number]  [facility] INCIDENTAL RADIOLOGY F/U NURSES: [id number]  # [phone]

## 2019-11-26 NOTE — TELEPHONE ENCOUNTER
[facility] Emergency Department Lab result notification [Adult-Female]    [place] ED lab result protocol used  Urine culture    Reason for call  Notify of lab results, assess symptoms,  review ED providers recommendations/discharge instructions (if necessary) and advise per ED lab result f/u protocol    Lab Result (including Rx patient on, if applicable)  Final Urine Culture Report on 11/26/19  Emergency Dept/Urgent Care discharge antibiotic prescribed: None, on daily prophylactic Cipro  #1. Bacteria, 10,000 to 50,000 colonies/mL Coagulase negative Staphylococcus , is  [RESISTANT] prophylactic Cipro  Change in treatment as per [place] ED Lab result protocol.  Information table from ED Provider visit on 11/22/19  Symptoms reported at ED visit (Chief complaint, HPI) Hypotension      History was provided by the patient, the patient's  and medical records.         Odalys Nathan is a 59 year old female with a history of ovarian cancer with metastases to the brain, status post cystoscopy and right ureteral stent placement (10/4/2019), PE (2012) and small bowel obstruction who presents for evaluation of hypotension.  Patient underwent a thoracentesis earlier today followed by an appointment with her infectious disease physician.  Patient is currently taking ciprofloxacin 500 mg p.o. daily for suppression of cholangitis.  The patient states her typical systolic blood pressure is between 90 - 100 but today at her appointment reached 78.  She states she was unable to work during yesterday due to her abdominal discomfort.  Patient's  believes she is dehydrated.  Patient complains of dizziness, weakness and shortness of breath slightly more than her baseline.  She denies fevers, chills, cough or dysuria.  Patient denies any history of anemia.  She notes she is a [employer] and does not wish to receive any blood products   Significant Medical hx, if applicable (i.e. CKD, diabetes)  ovarian  cancer with metastases to the brain, status post cystoscopy and right ureteral stent placement (10/4/2019),   Allergies No Known Allergies   Weight, if applicable Wt Readings from Last 2 Encounters:   11/11/19 59 kg (130 lb)   11/07/19 60.1 kg (132 lb 9.6 oz)      Coumadin/Warfarin [Yes /No] no   Creatinine Level (mg/dl) Creatinine   Date Value Ref Range Status   11/22/2019 0.65 0.52 - 1.04 mg/dL Final      Creatinine clearance (ml/min), if applicable Serum creatinine: 0.65 mg/dL 11/22/19 1741  Estimated creatinine clearance: 84.7 mL/min   ED providers Impression and Plan (applicable information) 59-year-old female presents to us with a chief complaint of hypotension.  Differential includes but not limited to urinary tract infection, pneumonia, dehydration, electrolyte imbalance, anemia.  Patient notes her blood pressure usually varies up to 90 systolic..  She was slightly lower than this at the clinic and triage today.  She is afebrile although slightly tachycardic at 103.  Patient felt significantly better with fluids.  Her blood pressures remained in the 80s while she is been here.  She is not anemic and has no leukocytosis.  BMP kidney function lactic acid are normal.  Patient does have leukocyte esterase and white cells and blood on her UA.  Initially gave her Rocephin for concern for new UTI.  Patient was evaluated by GYN oncology residents.  Patient does have a ureteral stent due to her cancer and has chronic similar findings in her urine.  Gynecology resident recommended discharge as this appears to be chronic findings for her.  The last time we had a similar urine culture only grew out normal urogenital estiven.  Talked with patient about admission versus discharge home.  Patient feels back to her baseline and is requesting with discharge home.  She is already on Cipro prophylactically for her previous cholangitis.  She does want to go home and agrees to return if you develop fever or any worsening symptoms.   "She does understand the risks of going home.  Her family is also comfortable with this plan was involved in the decision-making.  We will discharge her at her request   ED diagnosis Dehydration   ED provider Dylon Jenkins,       RN Assessment (Patient s current Symptoms), include time called.  [Insert Left message here if message left]  10:20AM: Spoke with patient. She states \"I feel like I have a bladder infection.\" \"And my tube is alittle hard to tolerate.\" Rates pain in the ureteral stent 3/10 \"it's mostly a constant pain.\" \"I always have a little blood in my urine.\" States that her urine is dark colored. Feels urgency and frequency. Urinating small amounts, does feel like she is emptying her bladder. Has some \"stinging\" at the end of urination. Denies any fever, nausea, vomiting, abdominal pain. Feels weak, but not worse than when she was in the ED. Continues to feel some shortness of breath \"but we are working on that.\" Denies any dizziness. Has been eating and drinking well.   Patient would like for her urologist to see this result and determine any treatment. Patient see's Dr. Wolf Gan [facility] urology. States next appointment with Dr. Gan is on 12/6/19    RN Recommendations/Instructions per [place] ED lab result protocol  Patient notified of lab result and treatment recommendations.   10:37AM: This RN spoke with Juan south at the [facility]. She states that Dr. Gan is not in clinic today and another provider would not look at this result. Advised Juan that the patient asked to have Dr. Gan review this result. She will send the result to Dr. Gan.  This RN will consult with the ED provider.   [place] Emergency Department Provider Name & Recommendations (included time consulted)  10:42AM: Consulted with [facility] ED provider Xochilt Downs NP. Reviewed patient's history and culture results. Advised provider that a message was going to " be sent to Dr. Gan from the urology nurse. Ok per Ed provider to order Macrobid 100mg twice a day for 5 days, continue current daily Cipro.        [RN Name]  Airam Ritter RN  [employer] [facility] RN  Lung Nodule and ED Lab Result RN  Epic pool (ED late result f/u RN): [id number]  [facility] INCIDENTAL RADIOLOGY F/U NURSES: [id number]  # [phone]    Copy of Lab result   Urine Culture Aerobic Bacterial [[id number]] (Order [id number])   Order Requisition     Urine Culture Aerobic Bacterial (Order #[id number]) on 11/22/19   Exam Information     Exam Date Exam Time Accession # Results    11/22/19  6:55 PM [id number]    Component Results     Specimen Information: Unspecified Urine        Component Collected Lab   Specimen Description 11/22/2019  6:55    Unspecified Urine    Special Requests 11/22/2019  6:55    Specimen received in preservative    Culture Micro Abnormal  11/22/2019  6:55    10,000 to 50,000 colonies/mL   Coagulase negative Staphylococcus    Susceptibility     Coagulase negative staphylococcus     Antibiotic Interpretation Sensitivity Method Status   CIPROFLOXACIN Resistant >=8 ug/mL MARKY Final   GENTAMICIN Sensitive <=0.5 ug/mL MARKY Final   LEVOFLOXACIN Resistant >=8 ug/mL MARKY Final   NITROFURANTOIN Sensitive <=16 ug/mL MARKY Final   OXACILLIN Resistant >=4 ug/mL MARKY Final   TETRACYCLINE Sensitive 2 ug/mL MARKY Final   Trimethoprim/Sulfa Resistant   MARKY Final   VANCOMYCIN Sensitive <=0.5 ug/mL MARKY Final

## 2019-11-26 NOTE — TELEPHONE ENCOUNTER
BENSON Carmona Dr.-Patient has a UTI. ER nurse will treat her with Macrobid. The patient wanted to let you know that she has another UTI.      Jarret Moody MA

## 2019-11-26 NOTE — TELEPHONE ENCOUNTER
RN from Dr Yeung's office called to give order for Normal Saline bolus fro systolic pressures under 90 or if patient is symptomatic over an hour.  Order received and will be acted on if necessary.  Yolette Acosta, RN, BSN

## 2019-11-27 NOTE — PROGRESS NOTES
Consent for bilateral thoracentesis and paracentesis obtained by Dr. Veras. Pt tolerated 1200 ml's yellow fluid from abdomen removal well.  Vital signs documented.  Right thoracentesis completed for 1100 ml's yellow fluid and left pleural tap for 300 ml's dark shola fluid.  Sterile dressings to sites and dry on discharge to home per wheelchair with family.  Care instructions understood.  Post procedure CXR deferred by Dr. Veras,.

## 2019-11-29 NOTE — PROCEDURES
[facility]    Procedure: Thoracentesis  Date/Time: 11/29/2019 1:44 PM  Performed by: Kendall Woods MD  Authorized by: Kendall Woods MD     UNIVERSAL PROTOCOL   Site Marked: Yes  Prior Images Obtained and Reviewed:  Yes  Required items: Required blood products, implants, devices and special equipment available    Patient identity confirmed:  Verbally with patient  NA - No sedation, light sedation, or local anesthesia  Confirmation Checklist:  Patient's identity using two indicators, procedure was appropriate and matched the consent or emergent situation, correct equipment/implants were available and relevant allergies  Time out: Immediately prior to the procedure a time out was called    Preparation: Patient was prepped and draped in usual sterile fashion       ANESTHESIA    Anesthesia: Local infiltration  Local Anesthetic:  Lidocaine 1% without epinephrine      SEDATION    Patient Sedated: No    PROCEDURE   Patient Tolerance:  Patient tolerated the procedure well with no immediate complications    Length of time physician/provider present for 1:1 monitoring during sedation: 0

## 2019-12-02 NOTE — PROGRESS NOTES
Patient tolerated right thoracentesis well.  1100 mL of straw colored fluid drained done by Alen Tang  Dressing clean dry and intact with no drainage.  No albumin given. Patient states understanding discharge instructions, taking P.O and home per family.  Yolette Acosta, RN, BSN

## 2019-12-02 NOTE — PROGRESS NOTES
RADIOLOGY PROCEDURE NOTE  Patient name: Odalys Nathan  MRN: [id number]  : 1960    Pre-procedure diagnosis: Pleural effusion  Post-procedure diagnosis: Same    Procedure Date/Time: 2019  11:13 AM  Procedure: Right thoracentesis  Estimated blood loss: None  Specimen(s) collected with description: pleural fluid  The patient tolerated the procedure well with no immediate complications.  Significant findings:none    See imaging dictation for procedural details.    Provider name: ELIZABETH Moore  Assistant(s):None

## 2019-12-04 NOTE — PROGRESS NOTES
Right thoracentesis completed per Dr Veras for 1200 ml clear shola fluid, patient tolerated well. Patient given fluid bolus of 1000 ml saline as she was hypotensive. See flowsheets for documentation, all discharge criteria were met and patient discharged to home via wheelchair in stable condition with family.

## 2019-12-04 NOTE — PROCEDURES
[facility]    Procedure: Right thoracentesis.   Date/Time: 12/4/2019 12:24 PM  Performed by: Patti Veras DO  Authorized by: Patti Veras DO     UNIVERSAL PROTOCOL   Site Marked: Yes  Prior Images Obtained and Reviewed:  Yes  Required items: Required blood products, implants, devices and special equipment available    Patient identity confirmed:  Verbally with patient  Patient was reevaluated immediately before administering moderate or deep sedation or anesthesia  Confirmation Checklist:  Patient's identity using two indicators, relevant allergies, procedure was appropriate and matched the consent or emergent situation and correct equipment/implants were available  Time out: Immediately prior to the procedure a time out was called    Preparation: Patient was prepped and draped in usual sterile fashion       ANESTHESIA    Anesthesia: Local infiltration      SEDATION    Patient Sedated: No    See dictated procedure note for full details.  Findings: Right thoracentesis.     Specimens: none    Complications: None    Condition: Stable    Plan: Discharge when stable.     PROCEDURE   Patient Tolerance:  Patient tolerated the procedure well with no immediate complications    Length of time physician/provider present for 1:1 monitoring during sedation: 0

## 2019-12-04 NOTE — TELEPHONE ENCOUNTER
Called to inform patient of surgery on 12/6/19 @ [facility] OR with Dr Gan had been rescheduled to 12/16/19 @ [place] OR due to anesthesia findings of patients current health conditions not suitable for ASC OR. Left voice mail for patient to call back to confirm @ [phone]

## 2019-12-05 NOTE — PROGRESS NOTES
Care Coordinator Note  Offered support moving into hospice care.   Contacted Estrella, her home care nurse and [facility]  for a consult and enroll.  Pt and her family would like to keep her at home to die.   Will wait to enroll until PleuraRX catheter is placed and urinary stent changed.  Have a call out to Urology to see if they can move up the stent exchange which now scheduled for 12/16 and maybe get the PleurX on the same day.   Arrangements made for IV fluids today at [facility].   POLST form completed scanned into the system and Marcos given a copy of it.   Arrangemnts made for Thoracentesis M-WF at [facility] until 12/20 if enters hospice before that will cancel them.   Reviewed labs from today with the pt and her family.  Continues on Cipro.         Patient and family verbalized back understanding of the above information discussed.   Face to face time spent with patient 25 minutes.  Tammy CISNEROS, RN, OCN  Care Coordinator   Gynecologic Cancer   Office [phone]

## 2019-12-05 NOTE — NURSING NOTE
"Oncology Rooming Note    December 5, 2019 8:01 AM   Odalys Nathan is a 59 year old female who presents for:    Chief Complaint   Patient presents with     Blood Draw     labs drawn with IV start by rn.  vital signs taken.     Initial Vitals: BP (!) 59/41   Pulse 116   Temp 98  F (36.7  C) (Oral)   Resp 16   Wt 50.3 kg (110 lb 12.8 oz)   SpO2 100%   BMI 18.44 kg/m   Estimated body mass index is 18.44 kg/m  as calculated from the following:    Height as of 11/11/19: 1.651 m (5' 5\").    Weight as of this encounter: 50.3 kg (110 lb 12.8 oz). Body surface area is 1.52 meters squared.  No Pain (0) Comment: Data Unavailable   No LMP recorded. Patient has had a hysterectomy.  Allergies reviewed: Yes  Medications reviewed: Yes    Medications: Medication refills not needed today.  Pharmacy name entered into [employer]: [id number] - [facility] [id number] [address]    Clinical concerns: Pt has been hypotensive.   Dr Yeung was notified.      Sophia Oneill CMA              "

## 2019-12-05 NOTE — PROGRESS NOTES
Nursing Note  Odalys [facility]:   Chief Complaint   Patient presents with     Infusion     IVF     During today's [facility] appointment, orders from Patricia Rocha CNP were completed.  Frequency: once    Progress note:  Patient identification verified by name and date of birth.  Assessment completed.  Vitals recorded in Doc Flowsheets.  Patient was provided with education regarding infusion and possible side effects.  Patient verbalized understanding.     present during visit today: Not Applicable.    Treatment Conditions: non-applicable.    Premedications: were not ordered.    Drug Waste Record: No    Infusion length and rate:  infusion given over approximately 60 minutes    Labs: were not ordered for this appointment.    Vascular access: peripheral IV was placed prior to appointment at [facility]. DC'd post infusion.    Post Infusion Assessment:  Patient tolerated infusion without incident.  Site patent and intact, free from redness, edema or discomfort.  No evidence of extravasations.  Access discontinued per protocol.     Discharge Plan:   Follow up plan of care with: primary care provider.  Discharge instructions were reviewed with patient.  Patient/representative verbalized understanding of discharge instructions and all questions answered.  Patient discharged from [facility] in stable condition.    Provider aware of low blood pressure and comfortable with patient discharging.    Allyson Hinojosa RN    Administrations This Visit     0.9% sodium chloride BOLUS     Admin Date  12/05/2019 Action  New Bag Dose  1000 mL Route  Intravenous Administered By  Allyson Hinojosa RN                BP (!) (P) 70/45   Pulse (P) 93

## 2019-12-05 NOTE — TELEPHONE ENCOUNTER
cancel 12/19 procedure   Received: Today   Message Contents   Eryn Diop RN Kittilstved, Haylee             See note below.  No need to call her.   Thanks   Eryn    Previous Messages      ----- Message -----   From: Catrachito Lloyd MD   Sent: 12/5/2019  10:23 AM CST   To: Nga Yeung MD, Breonna Alvarado RN, *     I completely agree. We don't need to do this ERCP. Eryn, we can cancel.     Catrachito         Procedure on 12/19/19 has been cancelled.     12/5/19 1101am

## 2019-12-05 NOTE — PATIENT INSTRUCTIONS
Dear Odalys Nathan    Thank you for choosing [facility] ([facility]) for your infusion.  The following information is a summary of our appointment as well as important reminders.      Today at specialty infusion you received 1 liter normal saline. Please notify provider if symptoms of low blood pressure continue.    We look forward in seeing you on your next appointment here at [facility] ([facility]).  Please don t hesitate to call us at [phone] to reschedule any of your appointments or to speak with one of the [facility] registered nurses.  It was a pleasure taking care of you today.    Sincerely,    [facility]  [address] [place]  [address]  Phone:  [phone]

## 2019-12-05 NOTE — TELEPHONE ENCOUNTER
Called to inform patient of surgery being rescheduled from 12/19/19 to 12/19/19 @ [place] OR with Dr Gan. Patient aware of the update.

## 2019-12-05 NOTE — PROGRESS NOTES
Pre Op Teaching Flowsheet       Pre and Post op Patient Education  Relevant Diagnosis:  Stricture or kinking of ureter   Surgical procedure:  CYSTOSCOPY, WITH right RETROGRADE PYELOGRAM AND URETERAL STENT REPLACEMENT   Teaching Topic:  Pre and post op teaching  Person Involved in teaching: Odalys Nathan    Motivation Level:  Asks Questions: Yes  Eager to Learn:  Yes  Cooperative: Yes  Receptive (willing/able to accept information):  Yes    Patient demonstrates understanding of the following:  Date of surgery:  12/9/19  Location of surgery:  [address] [place] [address]  History and Physical and any other testing necessary prior to surgery: Dr. Gan to update  Required time line for completion of History and Physical and any pre-op testing: Yes    Patient demonstrates understanding of the following:  Pre-op bowel prep:  None  Pre-op showering/scrub information with PCMX Soap: Yes  Blood thinner medications discussed and when to stop (if applicable):  Yes      Infection Prevention:   Patient demonstrates understanding of the following:  Surgical procedure site care taught: at time of discharge  Signs and symptoms of infection taught:  Yes      Post-op follow-up:  Discussed how to contact the hospital, nurse, and clinic scheduling staff if necessary.    Instructional materials used/given/mailed:  [place] [facility] Booklet, post op teaching sheet, Map, Soap, and arrival/location information.    Surgical instructions packet mailed to patient.    Patient has a  and someone to stay with her for the first 24 hours.

## 2019-12-05 NOTE — PROGRESS NOTES
Gynecologic Oncology Follow-Up Note    RE: Odalys Nathan  MRN: [id number]  : 1960  Date of Visit: 2019    CC: Odalys Nathan is a 59 year old year old female with recurrent ovarian cancer who presents today for follow up regarding disease management.  Plan for 11/15 to have a larger bilary stent placed with Dr. Lloyd.  Right Thoracentesis done 19, 19 and 19 (yesterday) On Cipro currently once daily prophylaxis 500 mg po q day. Last two days vomiting. Started Monday and Tuesday, vomited after megace the ate afterward. Had vomiting but still had bowels that were moving. Ureteral stent is bothering her but getting less and less bothersome. Plan is for biliary stent change  and ureteral stent .      Oncology History:  2012 - Admitted to hospital for 2 weeks of intermittent abdominal cramping, distention, diarrhea and N/V. CT of abdomen/pelvis significant for small bowel obstruction, a heterogenous soft tissue density in the pelvis, omental nodules, and ascites. Bilateral adnexal masses per U/S of pelvis. CA-125 was elevated at 987, CEA was normal at 1.0.    12 - Therapeutic paracentesis (4 L) with cytology confirming malignancy (VA positive, weak ER positive, CK-7 positive consistent with GYN primary). Surgery recommended d/t potential for falling blood counts 2/2 chemotherapy (patient is Shinto and limiting blood transfusion)    12 - Exploratory laparotomy, MAR BSO, lysis of adhesion, Appendectomy, Repair cystotomy, Omentectomy Giks-raasys-sedrtxhmb-transverse-colon resection, Ileo-descending colon anastomosis, CUSA, & Colonoscopy (done by Dr. Amato and colorectal team).  2012 - Admitted to hospital for bilateral pulmonary emboli and drainage of pleural effusion. Started on Lovenox.  12-3/21/12: Cycle #1-2 Carbo/Taxol IV  3/29/12 - Started on Keflex by her PCP for infection in her healing wound (immediately below her umbilicus).     4/11/12-6/14/12: Cycle #3-6 IV chemo, Cycle #1 IV/IP chemo  7/16/12 -  8, CT PRADEEP - enrolled in  - observation arm  3/4/13-6/28/13:  6, 9, 12, 15, 20.  7/1/13: CT Chest, Abdomen, Pelvis IMPRESSION:  1. Worsening metastatic ovarian carcinoma suggested by increased size of soft tissue nodules anterior to the right psoas muscle, that may represent growing mesenteric lymphadenopathy.  2. Remaining prominent right lower quadrant mesenteric lymph nodes are not significantly changed from CT 7/16/2012.  3. Clustered nodular opacities in the right lower lobe are not significant change from 7/16/2012 and remain indeterminate. Again, the appearance and distribution is suggestive of an infectious etiology.  4. Stable 8 mm soft tissue nodule in left breast, unchanged since at least 02/20/2012. This can be observed on followup studies, but correlation with mammography could be considered.  Decision made to start Doxil/Carbo.  7/11/13: The left ventricular ejection fraction is normal at 66.4%.  7/12/13-9/6/13: Cycle #1-3 Doxil/Carbo.  10, 11.    9/30/13 CT C/A/P Impression:  1. Overall, favorable response to treatment with decreasing size of soft tissue nodules tracking along the anterior aspect of the right psoas muscle.  2. 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  3. Improved cluster of right lower lobe pulmonary nodular opacities. These may represent resolving infection.  10/3/13-12/9/13:  9, 8, 10. Cycle 4-6 Doxil/Carbo.    1/13/14 CT C/A/P Impression:   1. Stable appearance of metastatic ovarian cancer. Scattered soft tissue nodules along the anterior aspect of the right psoas muscle are unchanged in size. Mild mesenteric lymphadenopathy is unchanged.  2. Clustered micronodules in the right lower lobe are unchanged from 9/30/2013, but improved from 7/1/2013. This history suggests a postinflammatory/postinfectious etiology.  3. 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  1/16/14  Discussed multiple options for her based on relatively stable-appearing disease on CT but slight increase in  (which has had small increase to 20 with last recurrence) including chemo break with recheck of  in 1 month, starting new chemo agent immediately, and exploratory surgery with possible resection of nodules. She is considered platinum-sensitive based on > 1 year remission after Taxol/Carbo, which we will take into consideration for future chemo planning. I am not inclined to surgery at this time given difficulty she already has with diarrhea secondary to past colon resection. I suspect we would need to resect further bowel due to mesenteric disease. Also explained inherent risks of any major surgery. Also mentioned maintenance chemo, but this has not been shown to increase overall survival and would likely decrease her quality of life without significant benefit. Family is going on vacation to [place] in 2 weeks and Odalys does not want to have chemo prior to that, so will plan to take 1 month break. She can have  at that time (discussed checking toady since last draw was in early December, but as it would likely not change treatment plan and she has h/o slow rising , will not check today).  2/17/14  16    2/20/14: Decision to take break from chemo for two months, followed by CT and CA-125.    4/21/14  27  4/21/14 CT C/A/P Impression:    1. Increased size of 2 low-attenuation lymph nodes anterior to the right psoas muscle is concerning for worsening metastatic ovarian cancer.    2. New circumferential thickening of a 3.8 cm length segment of distal transverse colon is likely physiologic. Recommend attention on followup imaging.    3. Grossly unchanged size of clustered small nodules versus scarring in the right lower lobe the lungs.    4. Stable thrombosis of the right ovarian vein.  5/14/14: Diagnostic laparoscopy converted to exploratory laparotomy and removal of  "mesenteric masses, tumor debulking, peritoneal biopsies and intraperitoneal port placement. On laparoscopy, it was noted that there were small nodules on the anterior abdominal wall near the previous incision, small nodules on the right pelvic sidewall as well. Nodules were palpated in the mesentery; however, as it was unable to clarify where the origin of the nodules was, the decision was made to open the patient. On opening there was found to be approximately a 3 cm nodule in the small bowel mesentery and another separate approximately 2 cm nodule in the bowel mesentery. Pelvis without evidence of cancer, some mesenteric lymph nodes were palpated. No evidence otherwise of any disseminated cancer throughout the abdomen.    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 (2/2)  -Largest metastasis: 1.3 cm  -See comment  D: Peritoneum, right paracolic gutter #2, biopsy:  -Fibroadipose tissue with granulomatous inflammation surrounding refractile material  -Negative for malignancy   E: Small bowel adhesion, biopsy:  -Fibroconnective tissue, consistent with adhesion  -Negative for malignancy  F: Lymph nodes, mesentry, not otherwise specified, excision:  -Two lymph nodes, negative for metastatic carcinoma (0/2)  G: Lymph node, mesentery, \"#2\", excision:  -One lymph node, negative for metastatic carcinoma (0/1)  H: Lymph nodes, mesentery, \"nodule #2\", excision:  -Five lymph nodes, negative for metastatic carcinoma (0/5)  COMMENT:  Some of the specimens show post-operative changes. Others show possible treatment related changes, including necrosis. The metastatic carcinoma in the " mesenteric lymph nodes (specimen C) shows variable morphology, including relatively low grade tumor with papillary architecture, and high grade tumor comprised of nests of tumor cells with irregular, slit-like spaces and marked nuclear pleomorphism.    5/29/14: Cycle 1 IV PACLitaxel / IP CISplatin / IP PACLitaxel.  - 28.  6/26/14: Cycle #2 IV/IP.  10  8/5/14: CT chest/abd/pelvis IMPRESSION     1. In this patient with ovarian cancer, overall findings are indicative of stable/slight improvement, as multiple mesenteric lymphadenopathy and scattered nodular peritoneal soft tissue mass lesions appear unchanged or slightly smaller since 4/21/2014.    2. 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 Mild dilatation of the second and the third portion of the duodenum with a narrow SMA angle. This could represent SMA syndrome, if clinically correlated.17/14:    Cycle #3 IV/IP.  10.    CT chest/abd/pelvis with contrast on 8/5/14    Impression:    1. In this patient with ovarian cancer, overall findings are indicative of stable/slight improvement, as multiple mesenteric lymphadenopathy and scattered nodular peritoneal soft tissue mass lesions appear unchanged or slightly smaller since 4/21/2014.    2. 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 Mild dilatation of the second and the third portion of the duodenum with a narrow SMA angle. This could represent SMA syndrome, if clinically correlated.  8/7/14: Cycle #4 Taxol/Carbo (changed from IV/IP).  10. She has been feeling okay. She is unsure if she can finish out the course of 6 cycles IV/IP taxol/cisplatin. She feels like she has the flu for about a week then starts feeling gradually better after each chemo cycle. Her spouse notes that she actually has been more sick with the treatments than she initially admits here. She was also previously having some rib pain. Denies any rib pain now. Denies any chest pain or shortness  of breath.  Plan: discussed recent CT cap results and switching to just IV as she is feeling miserable with IP treatments. Switch to IV carbo/taxol as patient is platinum sensitive.  We also discussed her taking part of the tesaro trial, which would require BRCA testing. She would like to take part in this trial if eligible.  8/20/14: Remove Intraperitoneal Port ( Port and catheter intact - discarded)  8/28/14: Cycle #5 Taxol/Carbo held due to thrombocytopenia.  6.    She denies any vaginal bleeding, no changes in her bowel or bladder habits, no nausea/emesis, no lower extremity edema, and no difficulties eating or sleeping. She denies any abdominal discomfort/bloating, no fevers or chills, and no chest pain or shortness of breath. She states her diarrhea is the same. She reports some fatigue which improves about 1-2 weeks after her chemotherapy. She states she does not need any medication refills and she was told she does not meet the criteria for the TESARO trial. She states she has 3 bags of iv fluids left over from her previous chemotherapy and will give these to herself. She states she is ready for her treatment today.    9/29/14: Cycle #6 Taxol/Carbo  6. Insurance questions regarding GSF coverage today. No concerns other than fatigue. Taking iron for anemia and does not desire blood transfusion. Using neulasta for neutropenia. Using home IV hydration if needed. Baseline unchanged. No abdominal bloating, constipation, diarrhea, pain, vaginal or rectal bleeding, cough or dyspnea, fluid retention.    10/16/14: Impression:    1. Nodular peritoneal soft tissue mass in the right lower quadrant adjacent to the psoas muscle is no longer appreciated. Adjacent prominent lymphadenopathy is unchanged from previous exam. No new peritoneal lesions.    2. 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     10/20/14:  5. CT chest/abdomen/pelvis on 10/16/14 showed nodular peritoneal soft tissue mass  in the right lower quadrant adjacent to the psoas muscle is no longer appreciated. Adjacent prominent lymphadenopathy is unchanged from previous exam. 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  1/27/15:  6.  4/28/15:  14.  5/26/15:  18.  6/2/15: CT cap Impression:  1. Postsurgical changes of hysterectomy and bilateral salpingo-oophorectomy for ovarian cancer. There is a new 8 mm hazy, ill-defined hypoattenuating lesion in hepatic segment 6 which is suspicious for a metastatic deposit. Further evaluation with ultrasound in recommended.    2. Increased size of a left retroperitoneal lymph node which is indeterminate but may represent a ciro metastasis. Mildly prominent lymph nodes in the right lower quadrant are not significantly changed.  3. Moderate colonic stool burden.    6/4/15: US abdomen IMPRESSION:    Hyperechoic lesion in the right hepatic lobe, consistent with hemangioma. This does not corresponds to the area of the lesion seen on CT from 6/2/2015. An MR would be helpful for identifying and characterizing the lesion from the recent CT.  6/12/15: MR abd IMPRESSION:  1. New 20 x 11 mm enhancing lesions between the right obliques, concerning for metastatic disease. This lesions should be amenable to percutaneous biopsy, if indicated.  2. Correlating to the lesion visualized on comparison CT is a hepatic segment 6 subcapsular 7 mm lesion. Overall the appearance favors the diagnosis of a simple cyst. However, there is faint suggestion of mild peripheral arterial enhancement. Although this is favored as  artifactual, this should be followed up to confirm stability. Recommend 6 month followup.  3. Hepatic segment 6, 5 mm lesion too small to technically characterize. Differential would favor FNH, less likely flash filling hemangioma. Recommend attention on followup.  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      9/1/15: Cycle #1 Avastin/Cytoxan.   31.      9/24/15:feeling generally well. She says she has been having back and stomach spasms. She is taking cytosine daily (she ran out yesterday). She also says its affecting her voice. Admits that it burns occasionally. She is eating and drinking normal. She also admit diarrhea, 5-7 times daily, lose/watery. She trying to stay hydrated and eat fiber. She also says that her body is sore, especially the bottom of her feet. Her blood pressure is normal. She also admits having headache after her first infusion.       9/24/15: Cycle #2 Avastin/Cytoxan.  19.  10/15/15: Cycle #3 Avastin/Cytoxan.  16.      11/6/15: CT c/a/p IMPRESSION:    1. Stable postoperative change of MAR/BSO for ovarian cancer.  2. The lesion in the right flank abdominal musculature is slightly decreased in size. Otherwise, stable examination.  2. No evidence of metastatic disease in the chest.     11/20/15: Treatment planning visit,  16     11/25/15: surgical pathology report  FINAL DIAGNOSIS:  Soft tissue, right oblique muscle mass, excision:  -Recurrent ovarian serous carcinoma  -Carcinoma is present less than 1 mm from one resection margin  -Background skeletal muscle and fibroadipose tissue     1/4/16:  22  1/11/16-1/27/16: Radiation to right flank x 12 treatments  4/7/2016:  94. CT cap IMPRESSION:    In this patient with ovarian cancer status post MAR/BSO and descending/transverse colectomy:  1. No evidence for malignancy in the chest, abdomen, or pelvis.  2. Stable small hypodense segment 6 liver lesion, appears more likely benign, possibly a cyst.  5/13/16:  124.  6/3/16: PET CT IMPRESSION: In this patient with a history of ovarian cancer:  1. Hypermetabolic and enlarging periaortic and perihepatic lymphadenopathy compatible with metastatic disease, as detailed  above.  2. Although hypodense lesion in hepatic segment 6 has been present since 6/2/2015 associated hypermetabolism makes this lesion highly concerning for metastatic disease.     Plan: to start Niraparib under TESARO study.      6/9/16:  137.  6/14/16: Cycle #1 Niraparib.    6/28/16: Cycle #1 D15 Niraparib.    7/5/16:  100.    7/11/16: Cycle #2 Nraparib.   83.     8/3/2016: PET CT IMPRESSION:    In this patient with known history of ovarian cancer:  1) New pleural based nodular opacities in the lateral and inferior aspects of the bilateral lower lobes, worse in the left lung. Likely infection. Close follow up is recommended.      2) Slight decrease in hypermetabolic abdominal lymphadenopathy. 2 hypermetabolic lymph nodes persist.  3) Unchanged right hepatic lobe metastatic lesion.      8/9/16: Cycle #3 Niraparib.  69.  9/6/16: Cycle #4 Niraparib.  53. Dose held due to anemia.  9/13/16: Eval for potential cycle 4 niraparib. Dose held due to anemia.  10/4/16: CT CAP impression:  IMPRESSION: In this patient with a known history of ovarian cancer:  1. There has been interval resolution of pleural-based nodular  opacities which likely represented infection.  2. Abdominal lymphadenopathy in the form of 2 portacaval lymph nodes  have not significantly changed in size, noted to be hypermetabolic on  prior PET/CT.  3. Previously demonstrated metastatic lesion in the right lobe of the  liver is not significantly changed.  10/11/16:  60  11/1/16: C6 niraparib,  75  11/29/16: C7 niraparib.  78. CT CAP impression as follows:  Target lesions (RECIST criteria):       A previously described target lesion superior to the head of the  pancreas (series 2, image 64)  (referred to as a perihepatic node on  6/3/2016) may not be a valid target lesion because it measured less  than 1.5 cm originally. However, this particular node has decreased in  size, now measuring 7 mm in short axis  versus 14 mm on 6/3/2016 when  measured in a similar fashion.       2.3 cm short axis portacaval lymph node on series 2 image 67,  previously 2.0 cm on 10/4/2016.       1.2 cm subtle hypodensity in hepatic segment 6 on series 2 image 75,  stable on multiple studies since at least 6/3/2016     Sum of diameters today: 3.5 cm. Sum of diameters 10/4/2016: 3.2 cm.  Growth = 9%.    12/27/16: C8 niraparib.  105.  1/25/17: C9 niraparib.  108.  2/23/17: C10 niraparib.  132.   CT CAP impression:  Sum of target lesion diameters today: 3.7 cm. Sum of target lesion  diameters on 11/28/2016: 3.5 cm. Growth= 6%  1. In this patient with history of ovarian cancer there is stable  disease by RECIST criteria as evidenced by:   1a. Mildly increased size of liver metastasis.  1b. Stable portacaval lymphadenopathy.  1c. No evidence of metastatic disease in the chest.  2. Trace emphysematous changes of the lungs.  3/22/17: C11 niraparib.  132.  4/19/17: C12 niraparib.  127.  5/16/17: CT CAP IMPRESSION: In this patient with ovarian cancer:  1. Mildly increased size of hepatic metastasis segment 6 with subtle increased capsular retraction.  2. Stable edmundo hepatis nodes, with mild increase in size of periaortic lymph nodes.  3. Prominent left supraclavicular lymph node with subtle increase in size compared to prior studies, particularly comparing to 10/4/2016.  4. Mild subtle groundglass opacities in the right upper lobe, not present on prior study, lesser extent in the right lower lobe.  Findings may represent infection, additional consideration is malignancy (less likely), and attention on follow-up study  Recommended.  Addendum:   Prominent left supraclavicular lymph node (3/25) is stable from most recent CT performed 2/20/2017, currently measuring 14 x 16 mm, previously 14 x 16 mm on 2/20/2017.      The portal caval lymph node/edmundo hepatis lymph node is stable from 2/20/2017, measuring 21  mm.      Clarification of size of the para-aortic lymph node (series 3 image 327). It short axis measurement is 11 mm versus 9 mm on prior study.      Hepatic segment 6 triangular-shaped low density lesion (3/362) measures 14 mm, previously measured 12 mm, demonstrating a  possible/questionable minimal subtle increase in size. Similarly the lymph nodes noted above in the supraclavicular and para-aortic regions demonstrate possible minimal possible subtle increase in size.      5/18/17: Cycle #13 Niraparib.  191.  6/15/17: Cycle #14 Niraparib.  146.  7/13/17: Cycle #15 Niraparib 200 mg.  171     CT (8/9/17):       IMPRESSION:  1. Segment 6 hepatic metastasis is stable to minimally increased in size.  2. Slight increase in multicentric adenopathy, most pronounced at the edmundo hepatis. Additional sites include the inferior left neck/supraclavicular region and retroperitoneum which appear stable to  minimally increased.      8/10/17:  175  9/14/17: MUGA LVEF 54%  9/22/17: C1D1 carboplatin/Doxil.  187.  10/19/17: C2D1 carboplatin/Doxil.  108.  11/17/17: C3D1 carboplatin/Doxil.  82.  12/14/17: C4D1 carboplatin/Doxil/avastin.  83.  Of note, avastin held on C4D14  1/12/18: C5D1 carboplatin/Doxil/avastin.  80.  1/26/18: platelets 61, patient was not given avastin due to being jehova's witness.   2/9/18: C6D1 carboplatin/Doxil/Avastin.   71.  3/2/18:  49. Three month treatment break.  6/20/2018:  170. CT CAP:  IMPRESSION: In this patient with history of ovarian cancer:  1. Increased size of a right hepatic lobe lesion and numerous edmundo hepatis and retroperitoneal lymph nodes compatible with progression of metastatic disease.  2. New mild intrahepatic biliary ductal dilatation, periportal edema, and pericholecystic fluid. Increased soft tissue fullness in the edmundo hepatis in the expected location of the common hepatic duct. Findings  are suspicious for  developing biliary ductal obstruction secondary to worsening metastatic disease in the edmundo hepatis. Correlation with liver function tests is recommended. Right upper quadrant ultrasound  may be beneficial.  3. Unchanged left supraclavicular and right hilar lymphadenopathy in the chest.      8/3/18: C1D1 weekly paclitaxel.  not done.  9/20/18: C2D1 weekly paclitaxel 80mg/m2. Ca 125-56  11/8/2018: C3D1 weekly paclitaxel;  26     12/17/18: Ct cap IMPRESSION:  1. New area of lobulated hypodense nodularity at the far-inferior right liver. This raises the possibility of a new area of hepatic metastasis.  2. Conversely, other nodules within the right liver are smaller suggesting improvement.  3. Improved adenopathy at the abdominal retroperitoneum. Improved left  supraclavicular adenopathy. Stable mildly prominent right hilar lymph nodes.  4. Previously noted ill-defined soft tissue at the edmundo hepatis region is still present but appears less prominent in size.  5. New finding of segmental wall thickening of the proximal sigmoid colon with some fluid distention of the adjacent colon. This could represent a segmental colitis. Other etiologies not excluded.     12/20/18:   19.  1/22/19:  45.  3/20/19: CT cap IMPRESSION:  1. Progression of disease with increasing size of a right inferior hepatic mass consistent with metastatic disease.  2. Increasing ill-defined multifocal regions of soft tissue at the edmundo hepatis consistent with malignant adenopathy versus malignant implants. Associated increased intrahepatic biliary ductal dilatation.  3. Other areas of progressive adenopathy noted at the left neck base, mediastinum, and abdominal retroperitoneum.  4. New area of carcinomatosis medial to stomach at the left upper abdomen.     3/20/19: Cycle #1 weekly paclitaxel.   180.  5/7/19: Cycle #2 weekly paclitaxel.    142.  6/20/19: Cycle #3 weekly paclitaxel/  259.     7/11/19: CT  CAP-IMPRESSION:  1. Slight increased size of the left supraclavicular lymph node.  Slight progression of the mediastinal lymph nodes is also evident. New  periesophageal node as described above. Retroperitoneal nodes are  stable if not slightly smaller. Some of these nodes appear to have  partially calcified suggesting a response to interval therapy.  2. Interval decreased size of the inferior right hepatic lobe lesion  now with an area of central calcification or enhancement. No new liver  lesions. Remaining abdominal organs are grossly unremarkable. No  significant hydronephrosis.  3. Postop changes involving the bowel and pelvic region. No recurrent  pelvic mass.     8/8/19: C1 gemcitabine 800mg/m2 IV days 1&8.  446.    8/23/19: Endoscopic Retrograde Cholangiopancreatogram with 4 mm Hurricane Balloon Dilation, gallbladder stent and Bile Duct stent placements, biliary Sphincterotomy with Dr. Lloyd. Was to move ERCP to 9/12 but readmitted to hospital       8/29/19: C2 gemcitabine 800mg/m2 IV days 1&8.  548.    10/17/2019-10/20/2019: - Electrolyte abnormalities (Hyponatremia, Hypokalemia, Hypomagnesemia) s/p repletion  - Bilateral pleural effusion, s/p right thoracentesis  - Recurrent ovarian cancer with mets to the liver and brain  - Retroperitoneal and mesenteric adenopathy  - Failure to thrive   - Severe malnutrition    Recurrent ovarian cancer in the setting of recent resection of brain mets and gamma knife radiation    10/24/2019-10/26/2019: hospital admission-[facility]  1.  Shortness of breath due to recurrent malignant right pleural effusion; s/p thoracentesis.  1.45 liters removed.     2.  Hypokalemia, acute on chronic.  Replaced per potassium replacement protocol.     3.  Hypomagnesemia, acute on chronic.  Replaced per Magnesium replacement protocol.     4.  Hypophosphatemia, acute on chronic.  Replaced per Phosphorous replacement protocol.      5.  Mildly elevated troponin level.  Suspect demand  ischemia from large right pleural effusion and respiratory difficulty. Echo showed normal EF and no WMA.      6.  Metastatic ovarian cancer (mets to liver and brain).       7.  Biliary stent in place with plans to convert to metal stent in near future.     8. Ureter stent in place with some hematuria.      9.  Recent klebsiella bacteremia of unclear source; on prolonged course of Augmentin.     10.  Chronic pain syndrome managed with chronic opiate therapy.     11.  Hypothyroidism.       12.  GERD.      10/28/2019: Admit to hospital  Discharge Dx:  1) Recurrent ovarian cancer, progressing disease  2) Biliary obstruction/Cholangitis  3) Recurrent pleural effusions  4) Failure to thrive  5) Hyponatremia, resolved  6) Hypomagnesemia, resolved  7) Hypophosphatemia, resolved  8) Asymmetric RLE edema  9) Elevated LFTs and bili  10) Anemia of chronic disease  11) Severe Malnutrition    10/29/2019: Procedure:           ERCP   Impression:          - Well positioned transpapillary gallbladder stent left                        in situ and not manipulated                        - Well positioned left primary stent removed from a                        widely patent biliary sphincterotomy                        - Synchronous malignant stenoses of the bifurcation and                        common duct with impressive intrahepatic (left more so                        than right) dilation                        - Evidence of cholangitis in the form of pus                        - Successful stent placement deep to the right and left                        intrahepatics with excellent drainage achieved          Date Value Ref Range Status   11/07/2019 2,244 (H) 0 - 30 U/mL Final     Comment:     Assay Method:  Chemiluminescence using Siemens Centaur XP   10/17/2019 1,993 (H) 0 - 30 U/mL Final     Comment:     Assay Method:  Chemiluminescence using Siemens Centaur XP   10/03/2019 1,398 (H) 0 - 30 U/mL Final     Comment:      Assay Method:  Chemiluminescence using Siemens Centaur XP   08/29/2019 548 (H) 0 - 30 U/mL Final     Comment:     Assay Method:  Chemiluminescence using Siemens Centaur XP   08/08/2019 446 (H) 0 - 30 U/mL Final     Comment:     Assay Method:  Chemiluminescence using Siemens Centaur XP   07/24/2019 425 (H) 0 - 30 U/mL Final     Comment:     Assay Method:  Chemiluminescence using Siemens Centaur XP   07/15/2019 340 (H) 0 - 30 U/mL Final     Comment:     Assay Method:  Chemiluminescence using Siemens Centaur XP   06/20/2019 259 (H) 0 - 30 U/mL Final     Comment:     Assay Method:  Chemiluminescence using Siemens Centaur XP   05/07/2019 142 (H) 0 - 30 U/mL Final     Comment:     Assay Method:  Chemiluminescence using Siemens Centaur XP   03/20/2019 180 (H) 0 - 30 U/mL Final     Comment:     Assay Method:  Chemiluminescence using Siemens Centaur XP         Past Medical History:   Diagnosis Date     Antiplatelet or antithrombotic long-term use      Ascites      Blood clot in the legs      Diabetes (H)      Ovarian cancer (H)     serous,stg IV     Pleural effusion      Pulmonary embolism (H) 2/2012     Refusal of blood transfusions as patient is Confucianism      Short gut syndrome      Subclinical hypothyroidism 4/18/2013     Thrombosis of leg        Past Surgical History:   Procedure Laterality Date     COLECTOMY       COLONOSCOPY  2/1/2012    Procedure:COLONOSCOPY; With Biopsy; Surgeon:TONI SULLIVAN; Location:[facility] OR     CYSTOSCOPY, RETROGRADES, INSERT STENT URETER(S), COMBINED Right 10/4/2019    Procedure: CYSTOSCOPY, WITH RETROGRADE PYELOGRAM AND URETERAL STENT INSERTION;  Surgeon: Wolf Gan MD;  Location: [facility]     ENDOSCOPIC RETROGRADE CHOLANGIOPANCREATOGRAM N/A 8/23/2019    Procedure: Endoscopic Retrograde Cholangiopancreatogram with 4 mm Hurricane Balloon Dilation, gallbladder stent and Bile Duct stent placements, Bilarry Sphincterotomy ;  Surgeon: Catrachito Lloyd MD;  Location: [facility]      ENDOSCOPIC RETROGRADE CHOLANGIOPANCREATOGRAM N/A 10/29/2019    Procedure: ENDOSCOPIC RETROGRADE CHOLANGIOPANCREATOGRAPH WITH BILARY STENT EXCHANGE, DEBRIDE AND STONE REMOVAL ;  Surgeon: Stephen Londono MD;  Location: [facility]     HYSTERECTOMY TOTAL ABD, RHIANNA SALPINGO-OOPHORECTOMY, NODE DISSECTION, TUMOR DEBULKING, COMBINED  2/1/2012    Procedure:COMBINED HYSTERECTOMY TOTAL ABDOMINAL, BILATERAL SALPINGO-OOPHORECTOMY, NODE DISSECTION, TUMOR DEBULKING;  Exploratory Laparotomy, Total Abdominal Hysterectomy, Bilateral Salpingo-Oophorectomy, appendectomy,lysis of adhesions, ileal, ascending, transverse and splenic flexure resection, ileal descending bowel renanastomosis, incidental cystotomy repair, CUSA procedure and colonoscopy ; [facility]     INSERT PORT PERITONEAL ACCESS  4/3/2012    Procedure:INSERT PORT PERITONEAL ACCESS; Intraperitoneal Port Placement (c-arm); Surgeon:SAMUEL CARRASCO; Location:[facility]     INSERT PORT PERITONEAL ACCESS  5/14/2014    Procedure: INSERT PORT PERITONEAL ACCESS;  Surgeon: Nga Yeung MD;  Location: [facility]     INSERT PORT VASCULAR ACCESS       IR PORT REMOVAL RIGHT  9/20/2019     LAPAROSCOPY DIAGNOSTIC (GYN)  5/14/2014    Procedure: LAPAROSCOPY DIAGNOSTIC (GYN);  Surgeon: Nga Yeung MD;  Location: [facility]     LAPAROTOMY EXPLORATORY Right 11/25/2015    Procedure: LAPAROTOMY EXPLORATORY;  Surgeon: Nga Yeung MD;  Location: [facility]     LAPAROTOMY, TUMOR DEBULKING, COMBINED  5/14/2014    Procedure: COMBINED LAPAROTOMY, TUMOR DEBULKING;  Surgeon: Nga Yeung MD;  Location: [facility]     OPTICAL TRACKING SYSTEM CRANIOTOMY, EXCISE TUMOR, COMBINED Right 9/18/2019    Procedure: Stealth Assisted Right Craniotomy And Tumor Resection;  Surgeon: Bertin Dewey MD;  Location: [facility]     PICC INSERTION Left 09/20/2019    5Fr - 46cm (4cm external), basilic vein, low SVC     REMOVE CATHETER PERITONEAL N/A 8/20/2014    Procedure: REMOVE CATHETER PERITONEAL;  Surgeon:  Nga Yeung MD;  Location: [facility]     VASCULAR SURGERY      stent left iliac vein       Current Outpatient Medications   Medication     albuterol (PROAIR HFA/PROVENTIL HFA/VENTOLIN HFA) 108 (90 Base) MCG/ACT inhaler     Ascorbic Acid (VITAMIN C PO)     Calcium Carbonate-Vitamin D (CALCIUM + D PO)     ciprofloxacin (CIPRO) 500 MG tablet     cyanocobalamin (VITAMIN B12) 1000 MCG/ML injection     diphenoxylate-atropine (LOMOTIL) 2.5-0.025 MG per tablet     dronabinol (MARINOL) 2.5 MG capsule     Ferrous Sulfate 324 (65 Fe) MG TBEC     guaiFENesin (MUCINEX) 600 MG 12 hr tablet     HEMP OIL OR EXTRACT OR OTHER CBD CANNABINOID, NOT MEDICAL CANNABIS,     HERBALS     ipratropium - albuterol 0.5 mg/2.5 mg/3 mL (DUONEB) 0.5-2.5 (3) MG/3ML neb solution     LEVOTHYROXINE SODIUM PO     loperamide (IMODIUM) 2 MG capsule     LORazepam (ATIVAN) 1 MG tablet     magnesium oxide (MAG-OX) 400 MG tablet     medical cannabis (Patient's own supply)     megestrol (MEGACE ORAL) 40 MG/ML suspension     morphine (MS CONTIN) 15 MG CR tablet     naloxone (NARCAN) 4 MG/0.1ML nasal spray     ondansetron (ZOFRAN-ODT) 4 MG ODT tab     oxyCODONE (ROXICODONE) 5 MG tablet     pantoprazole (PROTONIX) 40 MG EC tablet     polyethylene glycol (MIRALAX/GLYCOLAX) packet     potassium chloride (KLOR-CON) 20 MEQ packet     prochlorperazine (COMPAZINE) 10 MG tablet     senna (SENOKOT) 8.6 MG tablet     tamsulosin (FLOMAX) 0.4 MG capsule     VITAMIN E NATURAL PO     No current facility-administered medications for this visit.        No Known Allergies    Family History   Problem Relation Age of Onset     Cancer Mother 69        lung, smoker     Cancer Maternal Uncle 65        brain     Colon Cancer Maternal Aunt 80        colon       Social History     Socioeconomic History     Marital status:      Spouse name: Not on file     Number of children: Not on file     Years of education: Not on file     Highest education level: Not on file    Occupational History     Not on file   Social Needs     Financial resource strain: Not on file     Food insecurity:     Worry: Not on file     Inability: Not on file     Transportation needs:     Medical: Not on file     Non-medical: Not on file   Tobacco Use     Smoking status: Former Smoker     Packs/day: 0.00     Years: 8.00     Pack years: 0.00     Types: Cigarettes     Last attempt to quit: 1980     Years since quittin.4     Smokeless tobacco: Never Used     Tobacco comment: started at 9 yo and quit at 16 yo   Substance and Sexual Activity     Alcohol use: Yes     Comment: 3x/day wine or jodi     Drug use: No     Sexual activity: Not on file   Lifestyle     Physical activity:     Days per week: Not on file     Minutes per session: Not on file     Stress: Not on file   Relationships     Social connections:     Talks on phone: Not on file     Gets together: Not on file     Attends [employer] service: Not on file     Active member of club or organization: Not on file     Attends meetings of clubs or organizations: Not on file     Relationship status: Not on file     Intimate partner violence:     Fear of current or ex partner: Not on file     Emotionally abused: Not on file     Physically abused: Not on file     Forced sexual activity: Not on file   Other Topics Concern     Parent/sibling w/ CABG, MI or angioplasty before 65F 55M? Not Asked   Social History Narrative     Not on file           ROS  12 point ROS reviewed and as above.        Physical Exam:    Pulse 116   Temp 98  F (36.7  C) (Oral)   Resp 16   Wt 50.3 kg (110 lb 12.8 oz)   SpO2 100%   BMI 18.44 kg/m     88/49  70/40 in lab  59/41 in the clinic now    CONSTITUTIONAL: Chronically ill thin appearing female, appears fatigued and thin, no jaundice, in wheelchair  HEAD: Normocephalic, atraumatic; temporal wasting  EYES: PERRLA; jaundiced, skin appears less yellow   ENT: Oropharynx pink without lesions, thick white coating to  tongue  NECK: Neck supple, firm fixed left supraclavicular node roughly 4cm x 4cm, non-tender without erythema or edema, not eroding through skin  RESPIRATORY: CAMMY, LLL and RUL clear to auscultation, RML and RLL with diminished breath sounds, respirations unlabored   CV: Tachycardic, S1S2, no clicks, murmurs, rubs, or gallops; bilateral lower extremities without edema, dorsalis pedis pulses 2+ bilaterally  GASTROINTESTINAL: Normoactive bowel sounds x4 quadrants, abdomen soft, slightly distended, and non-tender to palpation without masses or organomegaly, no rebound tenderness, guarding, or rigidity  GENITOURINARY: Not indicated  MUSCULOSKELETAL: Moves all extremities; BUE 5/5 strength, RLE plantarflexion 4/5, RLE dorsiflexion 3/5, BLE plantarflexion and dorsiflexion 5/5  NEUROLOGIC: CN II-XII intact, gait not assessed  SKIN: Appropriate color for race, warm and dry, no rashes or lesions to unclothed skin  PSYCHIATRIC: Pleasant and interactive, affect bright, makes appropriate eye contact    Labs:        Assessment/Plan:    Disease: Less jaundiced now T. Bili down to 3.4  ID: Continue 500 mg po daily until sees ID on 11/21/19 regarding need for continued antibiotics due to cholangitis.  RTC in December with me so that we can assess where she is at that point. Considering chemotherapy still but wants to be at home as much as possible.    Has commode and shower chair now. Will get hospice to come in and assess to see if anything further needed at home.    DNR/DNI established with patient today. I suspect she is heading toward deciding on hospice care but will plan to see her in 1 month to see how she is feeling. I explained I would not consider chemotherapy until off antibiotics as too weak and fatigued now.  Will discuss with Dr. Lloyd holding the change of the biliary stent  Will see if can move ureteral stent change earlier as patient having discomfort with stent.  Pleurx cath placement    >40 minutes of face to  face time were spent with the patient with over 50% of that time spent in counseling, coordination of care, education, and symptom management.    Nga Yeung MD    Department of Ob/Gyn and Women's Health  Division of Gynecologic Oncology  [facility]  [phone]

## 2019-12-05 NOTE — NURSING NOTE
Chief Complaint   Patient presents with     Blood Draw     labs drawn with IV start by rn.  vital signs taken.     Labs drawn with IV start by rn in lab.  Vital signs taken.  Pt's blood pressure 70/40, pulse 116.  Currently, pt does not have an infusion appointment but IV started in case pt gets fluids today.  Pt reports her bp was 60's/systolic yesterday when she had a paracentesis.  Spoke with Dr Yeung regarding vital signs and Dr Yeung asked that pt be brought over to clinic so she could see her right away. Pt brought over to clinic by wheelchair,  with patient. Transferred care to rooming staff in clinic.  Betzy Posadas RN

## 2019-12-05 NOTE — LETTER
2019       RE: Odalys Nathan  [address]  [facility] [id number]     Dear Colleague,    Thank you for referring your patient, Odalys Nathan, to the [facility]. Please see a copy of my visit note below.    Gynecologic Oncology Follow-Up Note    RE: Odalys Nathan  MRN: [id number]  : 1960  Date of Visit: 2019    CC: Odalys Nathan is a 59 year old year old female with recurrent ovarian cancer who presents today for follow up regarding disease management.  Plan for 11/15 to have a larger bilary stent placed with Dr. Lloyd.  Right Thoracentesis done 19, 19 and 19 (yesterday) On Cipro currently once daily prophylaxis 500 mg po q day. Last two days vomiting. Started Monday and Tuesday, vomited after megace the ate afterward. Had vomiting but still had bowels that were moving. Ureteral stent is bothering her but getting less and less bothersome. Plan is for biliary stent change  and ureteral stent .      Oncology History:  2012 - Admitted to hospital for 2 weeks of intermittent abdominal cramping, distention, diarrhea and N/V. CT of abdomen/pelvis significant for small bowel obstruction, a heterogenous soft tissue density in the pelvis, omental nodules, and ascites. Bilateral adnexal masses per U/S of pelvis. CA-125 was elevated at 987, CEA was normal at 1.0.    12 - Therapeutic paracentesis (4 L) with cytology confirming malignancy (SC positive, weak ER positive, CK-7 positive consistent with GYN primary). Surgery recommended d/t potential for falling blood counts 2/2 chemotherapy (patient is Oriental orthodox and limiting blood transfusion)    12 - Exploratory laparotomy, MAR BSO, lysis of adhesion, Appendectomy, Repair cystotomy, Omentectomy Avlj-vkmani-gwwbygfef-transverse-colon resection, Ileo-descending colon anastomosis, CUSA, & Colonoscopy (done by Dr. Amato and colorectal team).  2012 - Admitted to  hospital for bilateral pulmonary emboli and drainage of pleural effusion. Started on Lovenox.  2/28/12-3/21/12: Cycle #1-2 Carbo/Taxol IV  3/29/12 - Started on Keflex by her PCP for infection in her healing wound (immediately below her umbilicus).    4/11/12-6/14/12: Cycle #3-6 IV chemo, Cycle #1 IV/IP chemo  7/16/12 -  8, CT PRADEEP - enrolled in  - observation arm  3/4/13-6/28/13:  6, 9, 12, 15, 20.  7/1/13: CT Chest, Abdomen, Pelvis IMPRESSION:  1. Worsening metastatic ovarian carcinoma suggested by increased size of soft tissue nodules anterior to the right psoas muscle, that may represent growing mesenteric lymphadenopathy.  2. Remaining prominent right lower quadrant mesenteric lymph nodes are not significantly changed from CT 7/16/2012.  3. Clustered nodular opacities in the right lower lobe are not significant change from 7/16/2012 and remain indeterminate. Again, the appearance and distribution is suggestive of an infectious etiology.  4. Stable 8 mm soft tissue nodule in left breast, unchanged since at least 02/20/2012. This can be observed on followup studies, but correlation with mammography could be considered.  Decision made to start Doxil/Carbo.  7/11/13: The left ventricular ejection fraction is normal at 66.4%.  7/12/13-9/6/13: Cycle #1-3 Doxil/Carbo.  10, 11.    9/30/13 CT C/A/P Impression:  1. Overall, favorable response to treatment with decreasing size of soft tissue nodules tracking along the anterior aspect of the right psoas muscle.  2. 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  3. Improved cluster of right lower lobe pulmonary nodular opacities. These may represent resolving infection.  10/3/13-12/9/13:  9, 8, 10. Cycle 4-6 Doxil/Carbo.    1/13/14 CT C/A/P Impression:   1. Stable appearance of metastatic ovarian cancer. Scattered soft tissue nodules along the anterior aspect of the right psoas muscle are unchanged in size. Mild mesenteric lymphadenopathy is  unchanged.  2. Clustered micronodules in the right lower lobe are unchanged from 9/30/2013, but improved from 7/1/2013. This history suggests a postinflammatory/postinfectious etiology.  3. 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  1/16/14 Discussed multiple options for her based on relatively stable-appearing disease on CT but slight increase in  (which has had small increase to 20 with last recurrence) including chemo break with recheck of  in 1 month, starting new chemo agent immediately, and exploratory surgery with possible resection of nodules. She is considered platinum-sensitive based on > 1 year remission after Taxol/Carbo, which we will take into consideration for future chemo planning. I am not inclined to surgery at this time given difficulty she already has with diarrhea secondary to past colon resection. I suspect we would need to resect further bowel due to mesenteric disease. Also explained inherent risks of any major surgery. Also mentioned maintenance chemo, but this has not been shown to increase overall survival and would likely decrease her quality of life without significant benefit. Family is going on vacation to [place] in 2 weeks and Odalys does not want to have chemo prior to that, so will plan to take 1 month break. She can have  at that time (discussed checking toady since last draw was in early December, but as it would likely not change treatment plan and she has h/o slow rising , will not check today).  2/17/14  16    2/20/14: Decision to take break from chemo for two months, followed by CT and CA-125.    4/21/14  27  4/21/14 CT C/A/P Impression:    1. Increased size of 2 low-attenuation lymph nodes anterior to the right psoas muscle is concerning for worsening metastatic ovarian cancer.    2. New circumferential thickening of a 3.8 cm length segment of distal transverse colon is likely physiologic. Recommend attention on followup imaging.  "   3. Grossly unchanged size of clustered small nodules versus scarring in the right lower lobe the lungs.    4. Stable thrombosis of the right ovarian vein.  5/14/14: Diagnostic laparoscopy converted to exploratory laparotomy and removal of mesenteric masses, tumor debulking, peritoneal biopsies and intraperitoneal port placement. On laparoscopy, it was noted that there were small nodules on the anterior abdominal wall near the previous incision, small nodules on the right pelvic sidewall as well. Nodules were palpated in the mesentery; however, as it was unable to clarify where the origin of the nodules was, the decision was made to open the patient. On opening there was found to be approximately a 3 cm nodule in the small bowel mesentery and another separate approximately 2 cm nodule in the bowel mesentery. Pelvis without evidence of cancer, some mesenteric lymph nodes were palpated. No evidence otherwise of any disseminated cancer throughout the abdomen.    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 (2/2)  -Largest metastasis: 1.3 cm  -See comment  D: Peritoneum, right paracolic gutter #2, biopsy:  -Fibroadipose tissue with granulomatous inflammation surrounding refractile material  -Negative for malignancy   E: Small bowel adhesion, biopsy:  -Fibroconnective tissue, consistent with adhesion  -Negative for malignancy  F: Lymph nodes, mesentry, not otherwise specified, excision:  -Two lymph nodes, negative for metastatic carcinoma (0/2)  G: Lymph node, mesentery, \"#2\", excision:  -One lymph node, negative for metastatic carcinoma (0/1)  H: Lymph nodes, mesentery, " "\"nodule #2\", excision:  -Five lymph nodes, negative for metastatic carcinoma (0/5)  COMMENT:  Some of the specimens show post-operative changes. Others show possible treatment related changes, including necrosis. The metastatic carcinoma in the mesenteric lymph nodes (specimen C) shows variable morphology, including relatively low grade tumor with papillary architecture, and high grade tumor comprised of nests of tumor cells with irregular, slit-like spaces and marked nuclear pleomorphism.    5/29/14: Cycle 1 IV PACLitaxel / IP CISplatin / IP PACLitaxel.  - 28.  6/26/14: Cycle #2 IV/IP.  10  8/5/14: CT chest/abd/pelvis IMPRESSION     1. In this patient with ovarian cancer, overall findings are indicative of stable/slight improvement, as multiple mesenteric lymphadenopathy and scattered nodular peritoneal soft tissue mass lesions appear unchanged or slightly smaller since 4/21/2014.    2. 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 Mild dilatation of the second and the third portion of the duodenum with a narrow SMA angle. This could represent SMA syndrome, if clinically correlated.17/14:    Cycle #3 IV/IP.  10.    CT chest/abd/pelvis with contrast on 8/5/14    Impression:    1. In this patient with ovarian cancer, overall findings are indicative of stable/slight improvement, as multiple mesenteric lymphadenopathy and scattered nodular peritoneal soft tissue mass lesions appear unchanged or slightly smaller since 4/21/2014.    2. 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 Mild dilatation of the second and the third portion of the duodenum with a narrow SMA angle. This could represent SMA syndrome, if clinically correlated.  8/7/14: Cycle #4 Taxol/Carbo (changed from IV/IP).  10. She has been feeling okay. She is unsure if she can finish out the course of 6 cycles IV/IP taxol/cisplatin. She feels like she has the flu for about a week then starts feeling " gradually better after each chemo cycle. Her spouse notes that she actually has been more sick with the treatments than she initially admits here. She was also previously having some rib pain. Denies any rib pain now. Denies any chest pain or shortness of breath.  Plan: discussed recent CT cap results and switching to just IV as she is feeling miserable with IP treatments. Switch to IV carbo/taxol as patient is platinum sensitive.  We also discussed her taking part of the tesaro trial, which would require BRCA testing. She would like to take part in this trial if eligible.  8/20/14: Remove Intraperitoneal Port ( Port and catheter intact - discarded)  8/28/14: Cycle #5 Taxol/Carbo held due to thrombocytopenia.  6.    She denies any vaginal bleeding, no changes in her bowel or bladder habits, no nausea/emesis, no lower extremity edema, and no difficulties eating or sleeping. She denies any abdominal discomfort/bloating, no fevers or chills, and no chest pain or shortness of breath. She states her diarrhea is the same. She reports some fatigue which improves about 1-2 weeks after her chemotherapy. She states she does not need any medication refills and she was told she does not meet the criteria for the TESARO trial. She states she has 3 bags of iv fluids left over from her previous chemotherapy and will give these to herself. She states she is ready for her treatment today.    9/29/14: Cycle #6 Taxol/Carbo  6. Insurance questions regarding GSF coverage today. No concerns other than fatigue. Taking iron for anemia and does not desire blood transfusion. Using neulasta for neutropenia. Using home IV hydration if needed. Baseline unchanged. No abdominal bloating, constipation, diarrhea, pain, vaginal or rectal bleeding, cough or dyspnea, fluid retention.    10/16/14: Impression:    1. Nodular peritoneal soft tissue mass in the right lower quadrant adjacent to the psoas muscle is no longer appreciated. Adjacent  prominent lymphadenopathy is unchanged from previous exam. No new peritoneal lesions.    2. 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     10/20/14:  5. CT chest/abdomen/pelvis on 10/16/14 showed nodular peritoneal soft tissue mass in the right lower quadrant adjacent to the psoas muscle is no longer appreciated. Adjacent prominent lymphadenopathy is unchanged from previous exam. 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  1/27/15:  6.  4/28/15:  14.  5/26/15:  18.  6/2/15: CT cap Impression:  1. Postsurgical changes of hysterectomy and bilateral salpingo-oophorectomy for ovarian cancer. There is a new 8 mm hazy, ill-defined hypoattenuating lesion in hepatic segment 6 which is suspicious for a metastatic deposit. Further evaluation with ultrasound in recommended.    2. Increased size of a left retroperitoneal lymph node which is indeterminate but may represent a ciro metastasis. Mildly prominent lymph nodes in the right lower quadrant are not significantly changed.  3. Moderate colonic stool burden.    6/4/15: US abdomen IMPRESSION:    Hyperechoic lesion in the right hepatic lobe, consistent with hemangioma. This does not corresponds to the area of the lesion seen on CT from 6/2/2015. An MR would be helpful for identifying and characterizing the lesion from the recent CT.  6/12/15: MR abd IMPRESSION:  1. New 20 x 11 mm enhancing lesions between the right obliques, concerning for metastatic disease. This lesions should be amenable to percutaneous biopsy, if indicated.  2. Correlating to the lesion visualized on comparison CT is a hepatic segment 6 subcapsular 7 mm lesion. Overall the appearance favors the diagnosis of a simple cyst. However, there is faint suggestion of mild peripheral arterial enhancement. Although this is favored as  artifactual, this should be followed up to confirm stability. Recommend 6 month followup.  3. Hepatic  segment 6, 5 mm lesion too small to technically characterize. Differential would favor FNH, less likely flash filling hemangioma. Recommend attention on followup.  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      9/1/15: Cycle #1 Avastin/Cytoxan.   31.      9/24/15:feeling generally well. She says she has been having back and stomach spasms. She is taking cytosine daily (she ran out yesterday). She also says its affecting her voice. Admits that it burns occasionally. She is eating and drinking normal. She also admit diarrhea, 5-7 times daily, lose/watery. She trying to stay hydrated and eat fiber. She also says that her body is sore, especially the bottom of her feet. Her blood pressure is normal. She also admits having headache after her first infusion.       9/24/15: Cycle #2 Avastin/Cytoxan.  19.  10/15/15: Cycle #3 Avastin/Cytoxan.  16.      11/6/15: CT c/a/p IMPRESSION:    1. Stable postoperative change of MAR/BSO for ovarian cancer.  2. The lesion in the right flank abdominal musculature is slightly decreased in size. Otherwise, stable examination.  2. No evidence of metastatic disease in the chest.     11/20/15: Treatment planning visit,  16     11/25/15: surgical pathology report  FINAL DIAGNOSIS:  Soft tissue, right oblique muscle mass, excision:  -Recurrent ovarian serous carcinoma  -Carcinoma is present less than 1 mm from one resection margin  -Background skeletal muscle and fibroadipose tissue     1/4/16:  22  1/11/16-1/27/16: Radiation to right flank x 12 treatments  4/7/2016:  94. CT cap IMPRESSION:    In this patient with ovarian cancer status post MAR/BSO and descending/transverse colectomy:  1. No evidence for malignancy in the chest, abdomen, or pelvis.  2. Stable small hypodense segment 6 liver lesion, appears more likely benign,  possibly a cyst.  5/13/16:  124.  6/3/16: PET CT IMPRESSION: In this patient with a history of ovarian cancer:  1. Hypermetabolic and enlarging periaortic and perihepatic lymphadenopathy compatible with metastatic disease, as detailed above.  2. Although hypodense lesion in hepatic segment 6 has been present since 6/2/2015 associated hypermetabolism makes this lesion highly concerning for metastatic disease.     Plan: to start Niraparib under TESARO study.      6/9/16:  137.  6/14/16: Cycle #1 Niraparib.    6/28/16: Cycle #1 D15 Niraparib.    7/5/16:  100.    7/11/16: Cycle #2 Nraparib.   83.     8/3/2016: PET CT IMPRESSION:    In this patient with known history of ovarian cancer:  1) New pleural based nodular opacities in the lateral and inferior aspects of the bilateral lower lobes, worse in the left lung. Likely infection. Close follow up is recommended.      2) Slight decrease in hypermetabolic abdominal lymphadenopathy. 2 hypermetabolic lymph nodes persist.  3) Unchanged right hepatic lobe metastatic lesion.      8/9/16: Cycle #3 Niraparib.  69.  9/6/16: Cycle #4 Niraparib.  53. Dose held due to anemia.  9/13/16: Eval for potential cycle 4 niraparib. Dose held due to anemia.  10/4/16: CT CAP impression:  IMPRESSION: In this patient with a known history of ovarian cancer:  1. There has been interval resolution of pleural-based nodular  opacities which likely represented infection.  2. Abdominal lymphadenopathy in the form of 2 portacaval lymph nodes  have not significantly changed in size, noted to be hypermetabolic on  prior PET/CT.  3. Previously demonstrated metastatic lesion in the right lobe of the  liver is not significantly changed.  10/11/16:  60  11/1/16: C6 niraparib,  75  11/29/16: C7 niraparib.  78. CT CAP impression as follows:  Target lesions (RECIST criteria):       A previously described target lesion superior to the head of the  pancreas  (series 2, image 64)  (referred to as a perihepatic node on  6/3/2016) may not be a valid target lesion because it measured less  than 1.5 cm originally. However, this particular node has decreased in  size, now measuring 7 mm in short axis versus 14 mm on 6/3/2016 when  measured in a similar fashion.       2.3 cm short axis portacaval lymph node on series 2 image 67,  previously 2.0 cm on 10/4/2016.       1.2 cm subtle hypodensity in hepatic segment 6 on series 2 image 75,  stable on multiple studies since at least 6/3/2016     Sum of diameters today: 3.5 cm. Sum of diameters 10/4/2016: 3.2 cm.  Growth = 9%.    12/27/16: C8 niraparib.  105.  1/25/17: C9 niraparib.  108.  2/23/17: C10 niraparib.  132.   CT CAP impression:  Sum of target lesion diameters today: 3.7 cm. Sum of target lesion  diameters on 11/28/2016: 3.5 cm. Growth= 6%  1. In this patient with history of ovarian cancer there is stable  disease by RECIST criteria as evidenced by:   1a. Mildly increased size of liver metastasis.  1b. Stable portacaval lymphadenopathy.  1c. No evidence of metastatic disease in the chest.  2. Trace emphysematous changes of the lungs.  3/22/17: C11 niraparib.  132.  4/19/17: C12 niraparib.  127.  5/16/17: CT CAP IMPRESSION: In this patient with ovarian cancer:  1. Mildly increased size of hepatic metastasis segment 6 with subtle increased capsular retraction.  2. Stable edmundo hepatis nodes, with mild increase in size of periaortic lymph nodes.  3. Prominent left supraclavicular lymph node with subtle increase in size compared to prior studies, particularly comparing to 10/4/2016.  4. Mild subtle groundglass opacities in the right upper lobe, not present on prior study, lesser extent in the right lower lobe.  Findings may represent infection, additional consideration is malignancy (less likely), and attention on follow-up study  Recommended.  Addendum:   Prominent left supraclavicular lymph node  (3/25) is stable from most recent CT performed 2/20/2017, currently measuring 14 x 16 mm, previously 14 x 16 mm on 2/20/2017.      The portal caval lymph node/edmundo hepatis lymph node is stable from 2/20/2017, measuring 21 mm.      Clarification of size of the para-aortic lymph node (series 3 image 327). It short axis measurement is 11 mm versus 9 mm on prior study.      Hepatic segment 6 triangular-shaped low density lesion (3/362) measures 14 mm, previously measured 12 mm, demonstrating a  possible/questionable minimal subtle increase in size. Similarly the lymph nodes noted above in the supraclavicular and para-aortic regions demonstrate possible minimal possible subtle increase in size.      5/18/17: Cycle #13 Niraparib.  191.  6/15/17: Cycle #14 Niraparib.  146.  7/13/17: Cycle #15 Niraparib 200 mg.  171     CT (8/9/17):       IMPRESSION:  1. Segment 6 hepatic metastasis is stable to minimally increased in size.  2. Slight increase in multicentric adenopathy, most pronounced at the edmundo hepatis. Additional sites include the inferior left neck/supraclavicular region and retroperitoneum which appear stable to  minimally increased.      8/10/17:  175  9/14/17: MUGA LVEF 54%  9/22/17: C1D1 carboplatin/Doxil.  187.  10/19/17: C2D1 carboplatin/Doxil.  108.  11/17/17: C3D1 carboplatin/Doxil.  82.  12/14/17: C4D1 carboplatin/Doxil/avastin.  83.  Of note, avastin held on C4D14  1/12/18: C5D1 carboplatin/Doxil/avastin.  80.  1/26/18: platelets 61, patient was not given avastin due to being jehova's witness.   2/9/18: C6D1 carboplatin/Doxil/Avastin.   71.  3/2/18:  49. Three month treatment break.  6/20/2018:  170. CT CAP:  IMPRESSION: In this patient with history of ovarian cancer:  1. Increased size of a right hepatic lobe lesion and numerous edmundo hepatis and retroperitoneal lymph nodes compatible with progression of metastatic disease.  2. New  mild intrahepatic biliary ductal dilatation, periportal edema, and pericholecystic fluid. Increased soft tissue fullness in the edmundo hepatis in the expected location of the common hepatic duct. Findings  are suspicious for developing biliary ductal obstruction secondary to worsening metastatic disease in the edmundo hepatis. Correlation with liver function tests is recommended. Right upper quadrant ultrasound  may be beneficial.  3. Unchanged left supraclavicular and right hilar lymphadenopathy in the chest.      8/3/18: C1D1 weekly paclitaxel.  not done.  9/20/18: C2D1 weekly paclitaxel 80mg/m2. Ca 125-56  11/8/2018: C3D1 weekly paclitaxel;  26     12/17/18: Ct cap IMPRESSION:  1. New area of lobulated hypodense nodularity at the far-inferior right liver. This raises the possibility of a new area of hepatic metastasis.  2. Conversely, other nodules within the right liver are smaller suggesting improvement.  3. Improved adenopathy at the abdominal retroperitoneum. Improved left  supraclavicular adenopathy. Stable mildly prominent right hilar lymph nodes.  4. Previously noted ill-defined soft tissue at the edmundo hepatis region is still present but appears less prominent in size.  5. New finding of segmental wall thickening of the proximal sigmoid colon with some fluid distention of the adjacent colon. This could represent a segmental colitis. Other etiologies not excluded.     12/20/18:   19.  1/22/19:  45.  3/20/19: CT cap IMPRESSION:  1. Progression of disease with increasing size of a right inferior hepatic mass consistent with metastatic disease.  2. Increasing ill-defined multifocal regions of soft tissue at the edmundo hepatis consistent with malignant adenopathy versus malignant implants. Associated increased intrahepatic biliary ductal dilatation.  3. Other areas of progressive adenopathy noted at the left neck base, mediastinum, and abdominal retroperitoneum.  4. New area of  carcinomatosis medial to stomach at the left upper abdomen.     3/20/19: Cycle #1 weekly paclitaxel.   180.  5/7/19: Cycle #2 weekly paclitaxel.    142.  6/20/19: Cycle #3 weekly paclitaxel/  259.     7/11/19: CT CAP-IMPRESSION:  1. Slight increased size of the left supraclavicular lymph node.  Slight progression of the mediastinal lymph nodes is also evident. New  periesophageal node as described above. Retroperitoneal nodes are  stable if not slightly smaller. Some of these nodes appear to have  partially calcified suggesting a response to interval therapy.  2. Interval decreased size of the inferior right hepatic lobe lesion  now with an area of central calcification or enhancement. No new liver  lesions. Remaining abdominal organs are grossly unremarkable. No  significant hydronephrosis.  3. Postop changes involving the bowel and pelvic region. No recurrent  pelvic mass.     8/8/19: C1 gemcitabine 800mg/m2 IV days 1&8.  446.    8/23/19: Endoscopic Retrograde Cholangiopancreatogram with 4 mm Hurricane Balloon Dilation, gallbladder stent and Bile Duct stent placements, biliary Sphincterotomy with Dr. Lloyd. Was to move ERCP to 9/12 but readmitted to hospital       8/29/19: C2 gemcitabine 800mg/m2 IV days 1&8.  548.    10/17/2019-10/20/2019: - Electrolyte abnormalities (Hyponatremia, Hypokalemia, Hypomagnesemia) s/p repletion  - Bilateral pleural effusion, s/p right thoracentesis  - Recurrent ovarian cancer with mets to the liver and brain  - Retroperitoneal and mesenteric adenopathy  - Failure to thrive   - Severe malnutrition    Recurrent ovarian cancer in the setting of recent resection of brain mets and gamma knife radiation    10/24/2019-10/26/2019: hospital admission-[facility]  1.  Shortness of breath due to recurrent malignant right pleural effusion; s/p thoracentesis.  1.45 liters removed.     2.  Hypokalemia, acute on chronic.  Replaced per potassium replacement  protocol.     3.  Hypomagnesemia, acute on chronic.  Replaced per Magnesium replacement protocol.     4.  Hypophosphatemia, acute on chronic.  Replaced per Phosphorous replacement protocol.      5.  Mildly elevated troponin level.  Suspect demand ischemia from large right pleural effusion and respiratory difficulty. Echo showed normal EF and no WMA.      6.  Metastatic ovarian cancer (mets to liver and brain).       7.  Biliary stent in place with plans to convert to metal stent in near future.     8. Ureter stent in place with some hematuria.      9.  Recent klebsiella bacteremia of unclear source; on prolonged course of Augmentin.     10.  Chronic pain syndrome managed with chronic opiate therapy.     11.  Hypothyroidism.       12.  GERD.      10/28/2019: Admit to hospital  Discharge Dx:  1) Recurrent ovarian cancer, progressing disease  2) Biliary obstruction/Cholangitis  3) Recurrent pleural effusions  4) Failure to thrive  5) Hyponatremia, resolved  6) Hypomagnesemia, resolved  7) Hypophosphatemia, resolved  8) Asymmetric RLE edema  9) Elevated LFTs and bili  10) Anemia of chronic disease  11) Severe Malnutrition    10/29/2019: Procedure:           ERCP   Impression:          - Well positioned transpapillary gallbladder stent left                        in situ and not manipulated                        - Well positioned left primary stent removed from a                        widely patent biliary sphincterotomy                        - Synchronous malignant stenoses of the bifurcation and                        common duct with impressive intrahepatic (left more so                        than right) dilation                        - Evidence of cholangitis in the form of pus                        - Successful stent placement deep to the right and left                        intrahepatics with excellent drainage achieved          Date Value Ref Range Status   11/07/2019 2,244 (H) 0 - 30 U/mL Final      Comment:     Assay Method:  Chemiluminescence using Siemens Centaur XP   10/17/2019 1,993 (H) 0 - 30 U/mL Final     Comment:     Assay Method:  Chemiluminescence using Siemens Centaur XP   10/03/2019 1,398 (H) 0 - 30 U/mL Final     Comment:     Assay Method:  Chemiluminescence using Siemens Centaur XP   08/29/2019 548 (H) 0 - 30 U/mL Final     Comment:     Assay Method:  Chemiluminescence using Siemens Centaur XP   08/08/2019 446 (H) 0 - 30 U/mL Final     Comment:     Assay Method:  Chemiluminescence using Siemens Centaur XP   07/24/2019 425 (H) 0 - 30 U/mL Final     Comment:     Assay Method:  Chemiluminescence using Siemens Centaur XP   07/15/2019 340 (H) 0 - 30 U/mL Final     Comment:     Assay Method:  Chemiluminescence using Siemens Centaur XP   06/20/2019 259 (H) 0 - 30 U/mL Final     Comment:     Assay Method:  Chemiluminescence using Siemens Centaur XP   05/07/2019 142 (H) 0 - 30 U/mL Final     Comment:     Assay Method:  Chemiluminescence using Siemens Centaur XP   03/20/2019 180 (H) 0 - 30 U/mL Final     Comment:     Assay Method:  Chemiluminescence using Siemens Centaur XP         Past Medical History:   Diagnosis Date     Antiplatelet or antithrombotic long-term use      Ascites      Blood clot in the legs      Diabetes (H)      Ovarian cancer (H)     serous,stg IV     Pleural effusion      Pulmonary embolism (H) 2/2012     Refusal of blood transfusions as patient is [employer]      Short gut syndrome      Subclinical hypothyroidism 4/18/2013     Thrombosis of leg        Past Surgical History:   Procedure Laterality Date     COLECTOMY       COLONOSCOPY  2/1/2012    Procedure:COLONOSCOPY; With Biopsy; Surgeon:TONI SULLIVAN; Location:[facility] OR     CYSTOSCOPY, RETROGRADES, INSERT STENT URETER(S), COMBINED Right 10/4/2019    Procedure: CYSTOSCOPY, WITH RETROGRADE PYELOGRAM AND URETERAL STENT INSERTION;  Surgeon: Wolf Gan MD;  [facility]     ENDOSCOPIC RETROGRADE CHOLANGIOPANCREATOGRAM  N/A 8/23/2019    Procedure: Endoscopic Retrograde Cholangiopancreatogram with 4 mm Hurricane Balloon Dilation, gallbladder stent and Bile Duct stent placements, Bilarry Sphincterotomy ;  Surgeon: Catrachito Lloyd MD;  Location: [facility] OR     ENDOSCOPIC RETROGRADE CHOLANGIOPANCREATOGRAM N/A 10/29/2019    Procedure: ENDOSCOPIC RETROGRADE CHOLANGIOPANCREATOGRAPH WITH BILARY STENT EXCHANGE, DEBRIDE AND STONE REMOVAL ;  Surgeon: Stephen Londono MD;  Location: [facility] OR     HYSTERECTOMY TOTAL ABD, RHIANNA SALPINGO-OOPHORECTOMY, NODE DISSECTION, TUMOR DEBULKING, COMBINED  2/1/2012    Procedure:COMBINED HYSTERECTOMY TOTAL ABDOMINAL, BILATERAL SALPINGO-OOPHORECTOMY, NODE DISSECTION, TUMOR DEBULKING;  Exploratory Laparotomy, Total Abdominal Hysterectomy, Bilateral Salpingo-Oophorectomy, appendectomy,lysis of adhesions, ileal, ascending, transverse and splenic flexure resection, ileal descending bowel renanastomosis, incidental cystotomy repair, CUSA procedure and colonoscopy ; [facility]     INSERT PORT PERITONEAL ACCESS  4/3/2012    Procedure:INSERT PORT PERITONEAL ACCESS; Intraperitoneal Port Placement (c-arm); Surgeon:SAMUEL CARRASCO; Location:[facility] OR     INSERT PORT PERITONEAL ACCESS  5/14/2014    Procedure: INSERT PORT PERITONEAL ACCESS;  Surgeon: Nga Yeung MD;  Location: [facility] OR     INSERT PORT VASCULAR ACCESS       IR PORT REMOVAL RIGHT  9/20/2019     LAPAROSCOPY DIAGNOSTIC (GYN)  5/14/2014    Procedure: LAPAROSCOPY DIAGNOSTIC (GYN);  Surgeon: Nga Yeung MD;  Location: [facility]     LAPAROTOMY EXPLORATORY Right 11/25/2015    Procedure: LAPAROTOMY EXPLORATORY;  Surgeon: Nga Yeung MD;  [facility]     LAPAROTOMY, TUMOR DEBULKING, COMBINED  5/14/2014    Procedure: COMBINED LAPAROTOMY, TUMOR DEBULKING;  Surgeon: Nga Yeung MD;  [facility]     OPTICAL TRACKING SYSTEM CRANIOTOMY, EXCISE TUMOR, COMBINED Right 9/18/2019    Procedure: Stealth Assisted Right Craniotomy And Tumor Resection;   Surgeon: Bertin Dewey MD;  Location: [facility]     PICC INSERTION Left 09/20/2019    5Fr - 46cm (4cm external), basilic vein, low SVC     REMOVE CATHETER PERITONEAL N/A 8/20/2014    Procedure: REMOVE CATHETER PERITONEAL;  Surgeon: Nga Yeung MD;  Location: [facility] OR     VASCULAR SURGERY      stent left iliac vein       Current Outpatient Medications   Medication     albuterol (PROAIR HFA/PROVENTIL HFA/VENTOLIN HFA) 108 (90 Base) MCG/ACT inhaler     Ascorbic Acid (VITAMIN C PO)     Calcium Carbonate-Vitamin D (CALCIUM + D PO)     ciprofloxacin (CIPRO) 500 MG tablet     cyanocobalamin (VITAMIN B12) 1000 MCG/ML injection     diphenoxylate-atropine (LOMOTIL) 2.5-0.025 MG per tablet     dronabinol (MARINOL) 2.5 MG capsule     Ferrous Sulfate 324 (65 Fe) MG TBEC     guaiFENesin (MUCINEX) 600 MG 12 hr tablet     HEMP OIL OR EXTRACT OR OTHER CBD CANNABINOID, NOT MEDICAL CANNABIS,     HERBALS     ipratropium - albuterol 0.5 mg/2.5 mg/3 mL (DUONEB) 0.5-2.5 (3) MG/3ML neb solution     LEVOTHYROXINE SODIUM PO     loperamide (IMODIUM) 2 MG capsule     LORazepam (ATIVAN) 1 MG tablet     magnesium oxide (MAG-OX) 400 MG tablet     medical cannabis (Patient's own supply)     megestrol (MEGACE ORAL) 40 MG/ML suspension     morphine (MS CONTIN) 15 MG CR tablet     naloxone (NARCAN) 4 MG/0.1ML nasal spray     ondansetron (ZOFRAN-ODT) 4 MG ODT tab     oxyCODONE (ROXICODONE) 5 MG tablet     pantoprazole (PROTONIX) 40 MG EC tablet     polyethylene glycol (MIRALAX/GLYCOLAX) packet     potassium chloride (KLOR-CON) 20 MEQ packet     prochlorperazine (COMPAZINE) 10 MG tablet     senna (SENOKOT) 8.6 MG tablet     tamsulosin (FLOMAX) 0.4 MG capsule     VITAMIN E NATURAL PO     No current facility-administered medications for this visit.        No Known Allergies    Family History   Problem Relation Age of Onset     Cancer Mother 69        lung, smoker     Cancer Maternal Uncle 65        brain     Colon Cancer Maternal Aunt 80         colon       Social History     Socioeconomic History     Marital status:      Spouse name: Not on file     Number of children: Not on file     Years of education: Not on file     Highest education level: Not on file   Occupational History     Not on file   Social Needs     Financial resource strain: Not on file     Food insecurity:     Worry: Not on file     Inability: Not on file     Transportation needs:     Medical: Not on file     Non-medical: Not on file   Tobacco Use     Smoking status: Former Smoker     Packs/day: 0.00     Years: 8.00     Pack years: 0.00     Types: Cigarettes     Last attempt to quit: 1980     Years since quittin.4     Smokeless tobacco: Never Used     Tobacco comment: started at 9 yo and quit at 16 yo   Substance and Sexual Activity     Alcohol use: Yes     Comment: 3x/day wine or jodi     Drug use: No     Sexual activity: Not on file   Lifestyle     Physical activity:     Days per week: Not on file     Minutes per session: Not on file     Stress: Not on file   Relationships     Social connections:     Talks on phone: Not on file     Gets together: Not on file     Attends Sikh service: Not on file     Active member of club or organization: Not on file     Attends meetings of clubs or organizations: Not on file     Relationship status: Not on file     Intimate partner violence:     Fear of current or ex partner: Not on file     Emotionally abused: Not on file     Physically abused: Not on file     Forced sexual activity: Not on file   Other Topics Concern     Parent/sibling w/ CABG, MI or angioplasty before 65F 55M? Not Asked   Social History Narrative     Not on file           ROS  12 point ROS reviewed and as above.        Physical Exam:    Pulse 116   Temp 98  F (36.7  C) (Oral)   Resp 16   Wt 50.3 kg (110 lb 12.8 oz)   SpO2 100%   BMI 18.44 kg/m      88/49  70/40 in lab  59/41 in the clinic now    CONSTITUTIONAL: Chronically ill thin appearing female,  appears fatigued and thin, no jaundice, in wheelchair  HEAD: Normocephalic, atraumatic; temporal wasting  EYES: PERRLA; jaundiced, skin appears less yellow   ENT: Oropharynx pink without lesions, thick white coating to tongue  NECK: Neck supple, firm fixed left supraclavicular node roughly 4cm x 4cm, non-tender without erythema or edema, not eroding through skin  RESPIRATORY: CAMMY, LLL and RUL clear to auscultation, RML and RLL with diminished breath sounds, respirations unlabored   CV: Tachycardic, S1S2, no clicks, murmurs, rubs, or gallops; bilateral lower extremities without edema, dorsalis pedis pulses 2+ bilaterally  GASTROINTESTINAL: Normoactive bowel sounds x4 quadrants, abdomen soft, slightly distended, and non-tender to palpation without masses or organomegaly, no rebound tenderness, guarding, or rigidity  GENITOURINARY: Not indicated  MUSCULOSKELETAL: Moves all extremities; BUE 5/5 strength, RLE plantarflexion 4/5, RLE dorsiflexion 3/5, BLE plantarflexion and dorsiflexion 5/5  NEUROLOGIC: CN II-XII intact, gait not assessed  SKIN: Appropriate color for race, warm and dry, no rashes or lesions to unclothed skin  PSYCHIATRIC: Pleasant and interactive, affect bright, makes appropriate eye contact    Labs:        Assessment/Plan:    Disease: Less jaundiced now T. Bili down to 3.4  ID: Continue 500 mg po daily until sees ID on 11/21/19 regarding need for continued antibiotics due to cholangitis.  RTC in December with me so that we can assess where she is at that point. Considering chemotherapy still but wants to be at home as much as possible.    Has commode and shower chair now. Will get hospice to come in and assess to see if anything further needed at home.    DNR/DNI established with patient today. I suspect she is heading toward deciding on hospice care but will plan to see her in 1 month to see how she is feeling. I explained I would not consider chemotherapy until off antibiotics as too weak and fatigued  now.  Will discuss with Dr. Lloyd holding the change of the biliary stent  Will see if can move ureteral stent change earlier as patient having discomfort with stent.  Pleurx cath placement    >40 minutes of face to face time were spent with the patient with over 50% of that time spent in counseling, coordination of care, education, and symptom management.    Nga Yeung MD    Department of Ob/Gyn and Women's Health  Division of Gynecologic Oncology  [facility]  [phone]

## 2019-12-06 NOTE — PROGRESS NOTES
Pt was in Radiology today for right thoracentesis. Procedure performed by Dr Narayanan . There were no complications during procedure however she is not eating much and is hypotensive, therefore an IV was established for procedure and 500 mL bolus saline was administered. B/P 86/50 post procedure lying down. Pt tolerated procedure well, 1000 cc's of cloudy yellow fluid was removed from right pleural space. Pt left department in stable and satisfactory condition with .

## 2019-12-06 NOTE — NURSING NOTE
Hospice referral  IR order for pleurex catheter  Message sent to urology to move up stent change appt

## 2019-12-06 NOTE — TELEPHONE ENCOUNTER
Care Coordinator Note  Urology appt changed to Monday 12/9/19 in the hospital Main OR..  Attempted to get PluerX catheter or thoracentesis done on Monday at the [facility] unable to do.    Rescheduled the one already scheduled at [facility] from 1030 to 230 PM  [facility] will be able to place the PleurX on Wednesday at 2 pm with a check in at 130 PM.  This information was left as a voicemail message to Odalys's home number plus to her home care nurse Estrella at [phone].      Tammy CISNEROS, RN, OCN  Care Coordinator   Gynecologic Cancer   Office [phone]

## 2019-12-09 NOTE — PROGRESS NOTES
RADIOLOGY PROCEDURE NOTE  Patient name: Odalys Nathan  MRN: [id number]  : 1960    Pre-procedure diagnosis: Pleural effusion  Post-procedure diagnosis: Same    Procedure Date/Time: 2019  2:57 PM  Procedure: Right thoracentesis  Estimated blood loss: None  Specimen(s) collected with description: pleural fluid  The patient tolerated the procedure well with no immediate complications.  Significant findings:none    See imaging dictation for procedural details.    Provider name: ELIZABETH Moore  Assistant(s):None

## 2019-12-09 NOTE — OP NOTE
OPERATIVE REPORT    PREOPERATIVE DIAGNOSIS: Right hydronephrosis    POSTOPERATIVE DIAGNOSIS:  Same    PROCEDURES PERFORMED:   1. Cystourethroscopy with right retrograde pyelography  2. Exchange of right ureteral stent.  3. Intraoperative interpretation of fluoroscopic imaging.     STAFF SURGEON: Wolf Gan MD    RESIDENT: Anam Ko MD    ANESTHESIA: MAC    ESTIMATED BLOOD LOSS: 0 mL.     DRAINS:  Right 6 x 26 JJ stent    OPERATIVE INDICATIONS:   Odalys Nathan is a 59 year old female with malignant ureteral obstruction due to ovarian cancer that is managed with indwelling ureteral stent. Her stent was placed in 10/2019. The patient was counseled on the alternatives, risks, and benefits and elected to proceed with the above stated procedure.    DESCRIPTION OF PROCEDURE:    After informed consent was obtained, the patient was taken to the operating room, and moved to the operating table.  After adequate anesthesia was induced, the patient was repositioned in dorsal lithotomy position and prepped and draped in the usual sterile fashion. A timeout was taken to confirm correct patient, procedure and laterality.     A 22-[place] cystoscope was inserted into a well lubricated urethra. The urethra was unremarkable.  The bladder was free of tumors, stones or diverticuli. Upon entering the bladder the distal end of the existing stent was grasped with a stent grasper and pulled to the urethral meatus. The stent did not appear encrusted. A sensor wire was passed through the stent to the renal pelvis and the stent was discharged. A retrograde pyelogram demonstrated mild hydronephrosis. A 6 x 26 stent was advanced over the guidewire under fluoroscopic guidance with a good curl in the renal pelvis and bladder. The stent passed up easily with no appreciable resistance. The bladder was then drained. The patient tolerated the procedure well.  There were no complications.       PLAN:   Discharge to home, next stent  exchange in 3-4 months    Anam Ko MD  Urology Resident

## 2019-12-09 NOTE — ANESTHESIA CARE TRANSFER NOTE
Patient: Odalys Nathan    Procedure(s):  CYSTOSCOPY, WITH right RETROGRADE PYELOGRAM AND URETERAL STENT REPLACEMENT    Diagnosis: Stricture or kinking of ureter [N13.5]  Diagnosis Additional Information: No value filed.    Anesthesia Type:   MAC     Note:  Airway :Nasal Cannula  Patient transferred to:Phase II  Comments: To P2, VSS, pt awake and alert, exchanging well, report to RN, care accepted.Handoff Report: Identifed the Patient, Identified the Reponsible Provider, Reviewed the pertinent medical history, Discussed the surgical course, Reviewed Intra-OP anesthesia mangement and issues during anesthesia, Set expectations for post-procedure period and Allowed opportunity for questions and acknowledgement of understanding      Vitals: (Last set prior to Anesthesia Care Transfer)    CRNA VITALS  12/9/2019 1144 - 12/9/2019 1214      12/9/2019             Resp Rate (set):  10                Electronically Signed By: HAILE Clinton CRNA  December 9, 2019  12:14 PM

## 2019-12-09 NOTE — OR NURSING
Dr. Galarza aware of BP. Calcium Chloride and Albumin ordered. Pt. OK with receiving Albumin.     Surgeon at bedside discussing goals of care.

## 2019-12-09 NOTE — ANESTHESIA PREPROCEDURE EVALUATION
Anesthesia Pre-Procedure Evaluation    Patient: Odalys Nathan   MRN:     [id number] Gender:   female   Age:    59 year old :      1960        Preoperative Diagnosis: Stricture or kinking of ureter [N13.5]   Procedure(s):  CYSTOSCOPY, WITH right RETROGRADE PYELOGRAM AND URETERAL STENT REPLACEMENT     Past Medical History:   Diagnosis Date     Antiplatelet or antithrombotic long-term use      Ascites      Blood clot in the legs      Diabetes (H)      Ovarian cancer (H)     serous,stg IV     Pleural effusion      Pulmonary embolism (H) 2012     Refusal of blood transfusions as patient is Yarsani      Short gut syndrome      Subclinical hypothyroidism 2013     Thrombosis of leg       Past Surgical History:   Procedure Laterality Date     COLECTOMY       COLONOSCOPY  2012    Procedure:COLONOSCOPY; With Biopsy; Surgeon:TONI SULLIVAN; Location:[facility]     CYSTOSCOPY, RETROGRADES, INSERT STENT URETER(S), COMBINED Right 10/4/2019    Procedure: CYSTOSCOPY, WITH RETROGRADE PYELOGRAM AND URETERAL STENT INSERTION;  Surgeon: Wolf Gan MD;  [facility]: [facility]     ENDOSCOPIC RETROGRADE CHOLANGIOPANCREATOGRAM N/A 2019    Procedure: Endoscopic Retrograde Cholangiopancreatogram with 4 mm Hurricane Balloon Dilation, gallbladder stent and Bile Duct stent placements, Bilarry Sphincterotomy ;  Surgeon: Catrachito Lloyd MD;  Location: [facility]     ENDOSCOPIC RETROGRADE CHOLANGIOPANCREATOGRAM N/A 10/29/2019    Procedure: ENDOSCOPIC RETROGRADE CHOLANGIOPANCREATOGRAPH WITH BILARY STENT EXCHANGE, DEBRIDE AND STONE REMOVAL ;  Surgeon: Stephen Londono MD;  Location: [facility]     HYSTERECTOMY TOTAL ABD, RHIANNA SALPINGO-OOPHORECTOMY, NODE DISSECTION, TUMOR DEBULKING, COMBINED  2012    Procedure:COMBINED HYSTERECTOMY TOTAL ABDOMINAL, BILATERAL SALPINGO-OOPHORECTOMY, NODE DISSECTION, TUMOR DEBULKING;  Exploratory Laparotomy, Total Abdominal Hysterectomy, Bilateral Salpingo-Oophorectomy,  appendectomy,lysis of adhesions, ileal, ascending, transverse and splenic flexure resection, ileal descending bowel renanastomosis, incidental cystotomy repair, CUSA procedure and colonoscopy ; Curtis     INSERT PORT PERITONEAL ACCESS  4/3/2012    Procedure:INSERT PORT PERITONEAL ACCESS; Intraperitoneal Port Placement (c-arm); Surgeon:SAMUEL CARRASCO; Location:[facility]     INSERT PORT PERITONEAL ACCESS  5/14/2014    Procedure: INSERT PORT PERITONEAL ACCESS;  Surgeon: Nga Yeung MD;  Location: [facility]     INSERT PORT VASCULAR ACCESS       IR PORT REMOVAL RIGHT  9/20/2019     LAPAROSCOPY DIAGNOSTIC (GYN)  5/14/2014    Procedure: LAPAROSCOPY DIAGNOSTIC (GYN);  Surgeon: Nga Yeung MD;  Location: [facility]     LAPAROTOMY EXPLORATORY Right 11/25/2015    Procedure: LAPAROTOMY EXPLORATORY;  Surgeon: Nga Yeung MD;  [facility]: [facility]     LAPAROTOMY, TUMOR DEBULKING, COMBINED  5/14/2014    Procedure: COMBINED LAPAROTOMY, TUMOR DEBULKING;  Surgeon: Nga Yeung MD;  Location: [facility]     OPTICAL TRACKING SYSTEM CRANIOTOMY, EXCISE TUMOR, COMBINED Right 9/18/2019    Procedure: Stealth Assisted Right Craniotomy And Tumor Resection;  Surgeon: Bertin Dewey MD;  Location: [facility]     PICC INSERTION Left 09/20/2019    5Fr - 46cm (4cm external), basilic vein, low SVC     REMOVE CATHETER PERITONEAL N/A 8/20/2014    Procedure: REMOVE CATHETER PERITONEAL;  Surgeon: Nga Yeung MD;  Location: [facility]     VASCULAR SURGERY      stent left iliac vein          Anesthesia Evaluation     . Pt has had prior anesthetic. Type: General and MAC    No history of anesthetic complications          ROS/MED HX    ENT/Pulmonary: Comment: bilat pl effusions   Last thoracentesis 12/6/19    (+)tobacco use, Past use , . .    Neurologic:  - neg neurologic ROS     Cardiovascular: Comment: H/o blood clots (PE and DVT) in the past. Not on anticoagulation at present.     (+) ----. : . . . :. . Previous cardiac testing  Echodate:10/24/19results:Interpretation Summary     1. The left ventricle is normal in structure, function and size. The visual  ejection fraction is estimated at 60%. Normal left ventricular wall motion  2. The right ventricle is normal in structure, function and size.  3. No valve disease.date: results: date: results: date: results:         (-) taking anticoagulants/antiplatelets   METS/Exercise Tolerance:     Hematologic: Comments: Jehova's Witness    (+) History of blood clots (2012) pt is not anticoagulated, Anemia, Other Hematologic Disorder-chemo induced neutropenia      Musculoskeletal:         GI/Hepatic: Comment: 10/29/2019  IMPRESSION: In this patient with history of metastatic ovarian cancer,  1. Increase in intrahepatic biliary ductal dilation, which may be seen  in the setting of stent failure.  2. Overall worsening hepatic metastatic disease with increased size of  the ill-defined lesions in hepatic segment 6 and at the level of the  edmundo hepatis. There are additional hypodense infiltrative lesions  seen throughout the liver which are new from prior exam. Scalloping of  the hepatic contour suggestive of malignant ascites.  3. 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  4. Increased size of the bilateral pleural effusions, moderate to  large on the right and small on the left with associated atelectasis.  5. Resolution of the previously seen right hydronephrosis with  interval placement of right nephroureteral stent.  6. Redemonstration of the retroperitoneal and mesenteric  Lymphadenopathy.  Ascites     (+) cholecystitis/cholelithiasis,       Renal/Genitourinary:         Endo:     (+) type II DM thyroid problem (subclinical ) hypothyroidism, .      Psychiatric:  - neg psychiatric ROS       Infectious Disease:         Malignancy:   (+) Malignancy History of Other  Other CA  ovarian CA w/ mets-->liver, brain, hilar and mediastinal lymphadenopathy  Active status post Surgery and Chemo         Other: Comment: Malnutrition   Hyponatremia  Alb   (+) H/O Chronic Pain,H/O chronic opiod use ,                        PHYSICAL EXAM:   Mental Status/Neuro: A/A/O   Airway: Facies: Feasible  Mallampati: I  Mouth/Opening: Full  TM distance: > 6 cm  Neck ROM: Full   Respiratory: Auscultation: CTAB     Resp. Rate: Normal     Resp. Effort: Normal      CV: Rhythm: Regular  Rate: Age appropriate  Heart: Normal Sounds  Edema: None   Comments:      Dental: Normal Dentition                LABS:  CBC:   Lab Results   Component Value Date    WBC 12.1 (H) 12/05/2019    WBC 6.2 11/22/2019    HGB 11.7 12/05/2019    HGB 11.0 (L) 11/22/2019    HCT 34.7 (L) 12/05/2019    HCT 34.4 (L) 11/22/2019     12/05/2019     11/22/2019     BMP:   Lab Results   Component Value Date     (L) 12/05/2019     (L) 11/22/2019    POTASSIUM 3.7 12/05/2019    POTASSIUM 4.2 11/22/2019    CHLORIDE 95 12/05/2019    CHLORIDE 99 11/22/2019    CO2 25 12/05/2019    CO2 27 11/22/2019    BUN 18 12/05/2019    BUN 8 11/22/2019    CR 0.75 12/05/2019    CR 0.65 11/22/2019     (H) 12/05/2019     (H) 11/22/2019     COAGS:   Lab Results   Component Value Date    PTT 33 11/22/2019    INR 1.20 (H) 11/22/2019    FIBR 540 (H) 10/28/2019     POC:   Lab Results   Component Value Date     (H) 12/09/2019    HCG Negative 08/20/2014     OTHER:   Lab Results   Component Value Date    LACT 1.0 11/22/2019    JOSE ALBERTO 7.9 (L) 12/05/2019    PHOS 2.7 11/07/2019    MAG 1.6 12/05/2019    ALBUMIN 0.8 (L) 12/05/2019    PROTTOTAL 5.6 (L) 12/05/2019    ALT 26 12/05/2019    AST 36 12/05/2019    GGT 21 08/10/2017    ALKPHOS 1,208 (H) 12/05/2019    BILITOTAL 1.2 12/05/2019    LIPASE 52 (L) 09/09/2019    AMYLASE 22 (L) 08/23/2019    TSH 5.83 (H) 01/03/2013    T4 0.84 01/03/2013    CRP 70.3 (H) 09/24/2019    SED 73 (H) 09/24/2019     "    Preop Vitals    BP Readings from Last 3 Encounters:   12/06/19 (!) 76/42   12/05/19 (!) (P) 70/45   12/05/19 (!) 59/41    Pulse Readings from Last 3 Encounters:   12/06/19 92   12/05/19 (P) 93   12/05/19 116      Resp Readings from Last 3 Encounters:   12/06/19 16   12/05/19 16   12/04/19 16    SpO2 Readings from Last 3 Encounters:   12/06/19 97%   12/05/19 100%   12/04/19 100%      Temp Readings from Last 1 Encounters:   12/05/19 36.7  C (98  F) (Oral)    Ht Readings from Last 1 Encounters:   11/11/19 1.651 m (5' 5\")      Wt Readings from Last 1 Encounters:   12/05/19 50.3 kg (110 lb 12.8 oz)    Estimated body mass index is 18.44 kg/m  as calculated from the following:    Height as of 11/11/19: 1.651 m (5' 5\").    Weight as of 12/5/19: 50.3 kg (110 lb 12.8 oz).     LDA:        Assessment:   ASA SCORE: 3    H&P: History and physical reviewed and following examination; no interval change.    NPO Status: Will be NPO Appropriate at ... 12/9/2019 11:30 AM     Plan:   Anes. Type:  MAC   Pre-Induction: albumin, calcium    Induction:  IV (Standard)   Airway: Native Airway   Access/Monitoring: PIV        Postop Plan:   Postop Pain: None  Postop Sedation/Airway: Not planned  Disposition: Outpatient     PONV Management: Adult Risk Factors: Female     CONSENT: Direct conversation   Plan and risks discussed with: Patient   Blood Products: Refusal (SOME/ALL Blood Products)  Reason for Refusal: Oriental orthodox  Willing to accept: Albumin       Comments for Plan/Consent:  10/29/19; 6244; Mask Ventilation: Not attempted (RSI); Ease of Intubation: Easy; Airway Size: 6.5;  Cuffed;  Oral;  Blade Type: Christopher;  Blade Size: 3;  Place by: Augustine ;  Insertion Attempts: 1;  Secured at (cm)to lip: 22 cm;  Breath Sounds: Equal, clear and bilateral;  End Tidal CO2: Present;  Dentition: Intact, Unchanged;  Grade View of Cords: 1                 Desire Van Sloun, MD  "

## 2019-12-09 NOTE — PROGRESS NOTES
Pt was in Radiology today for right thoracentesis. Procedure performed by  LUIS DANIEL JOHNSON. There were no complications during procedure and vitals remained stable throughout. Pt tolerated procedure well, 1200 cc's of  Cloudy yellow fluid was removed from right pleural space, there was some fluid that did not come out however since she is having a pleural cath placement in 2 days we will go ahead and be done for today. She was given written and verbal instructions. Pt left department in stable and satisfactory condition with .

## 2019-12-09 NOTE — ANESTHESIA POSTPROCEDURE EVALUATION
Anesthesia POST Procedure Evaluation    Patient: Odalys Nathan   MRN:     [id number] Gender:   female   Age:    59 year old :      1960        Preoperative Diagnosis: Stricture or kinking of ureter [N13.5]   Procedure(s):  CYSTOSCOPY, WITH right RETROGRADE PYELOGRAM AND URETERAL STENT REPLACEMENT   Postop Comments: No value filed.       Anesthesia Type:  Not documented  MAC    Reportable Event: NO     PAIN: Uncomplicated   Sign Out status: Comfortable, Well controlled pain     PONV: No PONV   Sign Out status:  No Nausea or Vomiting     Neuro/Psych: Uneventful perioperative course   Sign Out Status: Preoperative baseline; Age appropriate mentation     Airway/Resp.: Uneventful perioperative course   Sign Out Status: Non labored breathing, age appropriate RR; Resp. Status within EXPECTED Parameters     CV: Uneventful perioperative course   Sign Out status: Appropriate BP and perfusion indices; Appropriate HR/Rhythm     Disposition:   Sign Out in:  PACU  Disposition:  Phase II; Home  Recovery Course: Uneventful  Follow-Up: Not required           Last Anesthesia Record Vitals:  CRNA VITALS  2019 1135 - 2019 1235      2019             Resp Rate (set):  10          Last PACU Vitals:  Vitals Value Taken Time   BP 67/46 2019 12:12 PM   Temp 36.7  C (98  F) 2019 12:12 PM   Pulse     Resp 10 2019 12:12 PM   SpO2     Temp src     NIBP 77/63 2019 12:04 PM   Pulse 85 2019 12:05 PM   SpO2 96 % 2019 12:05 PM   Resp     Temp     Ht Rate 82 2019 12:04 PM   Temp 2           Electronically Signed By: Desire Galarza MD, 2019, 12:50 PM

## 2019-12-11 NOTE — PROGRESS NOTES
Patient tolerated right sided tunneled chest tube catheter well by Dr Narayanan.  No sedation used, vitals remained stable through out procedure.  Patient given packet for home care nurse and take home box.  Dressing clean, dry and intact upon discharge. 1100 ml of Straw colored fluid drained.  Yolette Acosta RN, BSN

## 2020-01-01 ENCOUNTER — PREP FOR PROCEDURE (OUTPATIENT)
Dept: UROLOGY | Facility: CLINIC | Age: 60
End: 2020-01-01

## 2020-01-01 DIAGNOSIS — N13.5 STRICTURE OR KINKING OF URETER: Primary | ICD-10-CM

## 2020-01-01 RX ORDER — HEPARIN SODIUM (PORCINE) LOCK FLUSH IV SOLN 100 UNIT/ML 100 UNIT/ML
500 SOLUTION INTRAVENOUS DAILY PRN
Status: CANCELLED
Start: 2020-01-01

## 2020-01-01 RX ORDER — CEFAZOLIN SODIUM 2 G/50ML
2 SOLUTION INTRAVENOUS
Status: CANCELLED | OUTPATIENT
Start: 2020-01-01

## 2020-01-01 RX ORDER — CEFAZOLIN SODIUM 1 G/50ML
1 INJECTION, SOLUTION INTRAVENOUS SEE ADMIN INSTRUCTIONS
Status: CANCELLED | OUTPATIENT
Start: 2020-01-01

## 2020-01-08 ENCOUNTER — MYC MEDICAL ADVICE (OUTPATIENT)
Dept: ONCOLOGY | Facility: CLINIC | Age: 60
End: 2020-01-08

## 2020-01-09 PROBLEM — N13.5 STRICTURE OR KINKING OF URETER: Status: ACTIVE | Noted: 2019-01-01

## 2020-01-14 ENCOUNTER — TELEPHONE (OUTPATIENT)
Dept: ONCOLOGY | Facility: CLINIC | Age: 60
End: 2020-01-14

## 2020-01-14 NOTE — TELEPHONE ENCOUNTER
Received notice of patient death.  Date of Death  01.07.2020  All appointments, orders and treatment plans cancelled.  Care TEAM and HIM notified

## 2022-02-17 NOTE — PROGRESS NOTES
Therapy:IV FLUIDS  Insurance: [employer]   Ded: $1375  Met: $1375    Co-Insurance: 20%  Max Out of Pocket: $2875  Met: $2875    Please contact Intake with any questions, [phone] or In Basket pool, [facility] ([phone]).  IN REFERENCE TO REFERRAL MADE ON 11/26/2019 TO CHECK HYDRATION COVERAGE   
PAST MEDICAL HISTORY:  Dyslipidemia

## 2022-10-13 NOTE — PROGRESS NOTES
Review of Systems     Constitutional:  Negative for fever, chills, weight loss, weight gain, fatigue, decreased appetite, night sweats, recent stressors, height gain, height loss, post-operative complications, incisional pain, hallucinations, increased energy, hyperactivity and confused.   HENT:  Negative for ear pain, hearing loss, tinnitus, nosebleeds, trouble swallowing, hoarse voice, mouth sores, sore throat, ear discharge, tooth pain, gum tenderness, taste disturbance, smell disturbance, hearing aid, bleeding gums, dry mouth, sinus pain, sinus congestion and neck mass.    Eyes:  Negative for double vision, pain, redness, eye pain, decreased vision, eye watering, eye bulging, eye dryness, flashing lights, spots, floaters, strabismus, tunnel vision, jaundice and eye irritation.   Respiratory:   Negative for cough, hemoptysis, sputum production, shortness of breath, wheezing, sleep disturbances due to breathing, snores loudly, respiratory pain, dyspnea on exertion, cough disturbing sleep and postural dyspnea.    Cardiovascular:  Negative for chest pain, dyspnea on exertion, palpitations, orthopnea, claudication, leg swelling, fingers/toes turn blue, hypertension, hypotension, syncope, history of heart murmur, chest pain on exertion, chest pain at rest, pacemaker, few scattered varicosities, leg pain, sleep disturbances due to breathing, tachycardia, light-headedness, exercise intolerance and edema.   Gastrointestinal:  Negative for heartburn, nausea, vomiting, abdominal pain, diarrhea, constipation, blood in stool, melena, rectal pain, bloating, hemorrhoids, bowel incontinence, jaundice, rectal bleeding, coffee ground emesis and change in stool.   Genitourinary:  Negative for bladder incontinence, dysuria, urgency, hematuria, flank pain, vaginal discharge, difficulty urinating, genital sores, dyspareunia, decreased libido, nocturia, voiding less frequently, arousal difficulty, abnormal vaginal bleeding,  excessive menstruation, menstrual changes, hot flashes, vaginal dryness and postmenopausal bleeding.   Musculoskeletal:  Negative for myalgias, back pain, joint swelling, arthralgias, stiffness, muscle cramps, neck pain, bone pain, muscle weakness and fracture.   Skin:  Negative for nail changes, itching, poor wound healing, rash, hair changes, skin changes, acne, warts, poor wound healing, scarring, flaky skin, Raynaud's phenomenon, sensitivity to sunlight and skin thickening.   Neurological:  Negative for dizziness, tingling, tremors, speech change, seizures, loss of consciousness, weakness, light-headedness, numbness, headaches, disturbances in coordination, extremity numbness, memory loss, difficulty walking and paralysis.   Endo/Heme:  Negative for anemia, swollen glands and bruises/bleeds easily.   Psychiatric/Behavioral:  Negative for depression, hallucinations, memory loss, decreased concentration, mood swings and panic attacks.    Breast:  Negative for breast discharge, breast mass, breast pain and nipple retraction.   Endocrine:  Negative for altered temperature regulation, polyphagia, polydipsia, unwanted hair growth and change in facial hair.    Gynecologic Oncology Follow-Up Note  RE: Odalys Nathan  MRN: [id number]  : 1960  Date of Visit: 2017    CC: Odalys Nathan is a 56 year old year old female with recurrent stage IIIC bilateral ovarian cancer who presents today for follow up regarding disease management while on the TESARO niraparib trial.    HPI: Odalys returns today infollo up for her recurrent ovarian cancer. She is currently on a clinical trial with niraparib and tolerating it well. She has been on it for nearly a year now. She noticed in [place] when she was out in the sun that she got extremely itchy on her back that was quite bothersome to her and required a cold shower to resolve. This happened a second time here while in the sun. She was not sure if it was related  to the medication. She otherwise denies any major symptoms. She has had no fevers or chills. Her energy level is fair, not 100% but she is able to remain active. She is eating well. She has baseline diarrhea from prior surgery which is unchanged.       Brief Oncology History:  1/18/2012 - Admitted to hospital for 2 weeks of intermittent abdominal cramping, distention, diarrhea and N/V. CT of abdomen/pelvis significant for small bowel obstruction, a heterogenous soft tissue density in the pelvis, omental nodules, and ascites. Bilateral adnexal masses per U/S of pelvis. CA-125 was elevated at 987, CEA was normal at 1.0.    1/18/12 - Therapeutic paracentesis (4 L) with cytology confirming malignancy (OK positive, weak ER positive, CK-7 positive consistent with GYN primary). Surgery recommended d/t potential for falling blood counts 2/2 chemotherapy (patient is Yazdanism and limiting blood transfusion)    2/1/12 - Exploratory laparotomy, MAR BSO, lysis of adhesion, Appendectomy, Repair cystotomy, Omentectomy Qsca-vjxsga-dqoxnojin-transverse-colon resection, Ileo-descending colon anastomosis, CUSA, & Colonoscopy (done by Dr. Amato and colorectal team).  2/20/2012 - Admitted to hospital for bilateral pulmonary emboli and drainage of pleural effusion. Started on Lovenox.  2/28/12-3/21/12: Cycle #1-2 Carbo/Taxol IV  3/29/12 - Started on Keflex by her PCP for infection in her healing wound (immediately below her umbilicus).    4/11/12-6/14/12: Cycle #3-6 IV chemo, Cycle #1 IV/IP chemo  7/16/12 -  8, CT PRADEEP - enrolled in  - observation arm  3/4/13-6/28/13:  6, 9, 12, 15, 20.  Decision made to start Doxil/Carbo.  7/11/13: The left ventricular ejection fraction is normal at 66.4%.  7/12/13-9/6/13: Cycle #1-3 Doxil/Carbo.  10, 11.    2/20/14: Decision to take break from chemo for two months, followed by CT and CA-125.    4/21/14  27  exploratory laparotomy and removal of mesenteric masses,  "tumor debulking, peritoneal biopsies and intraperitoneal port placement. On laparoscopy, it was noted that there were small nodules on the anterior abdominal wall near the previous incision, small nodules on the right pelvic sidewall as well. Nodules were palpated in the mesentery; however, as it was unable to clarify where the origin of the nodules was, the decision was made to open the patient. On opening there was found to be approximately a 3 cm nodule in the small bowel mesentery and another separate approximately 2 cm nodule in the bowel mesentery. Pelvis without evidence of cancer, some mesenteric lymph nodes were palpated. No evidence otherwise of any disseminated cancer throughout the abdomen.    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 (2/2)  -Largest metastasis: 1.3 cm  -See comment  D: Peritoneum, right paracolic gutter #2, biopsy:  -Fibroadipose tissue with granulomatous inflammation surrounding refractile material  -Negative for malignancy   E: Small bowel adhesion, biopsy:  -Fibroconnective tissue, consistent with adhesion  -Negative for malignancy  F: Lymph nodes, mesentry, not otherwise specified, excision:  -Two lymph nodes, negative for metastatic carcinoma (0/2)  G: Lymph node, mesentery, \"#2\", excision:  -One lymph node, negative for metastatic carcinoma (0/1)  H: Lymph nodes, mesentery, \"nodule #2\", excision:  -Five lymph nodes, negative for metastatic carcinoma (0/5)  COMMENT:  Some of the specimens show post-operative changes. Others show possible treatment related changes, including necrosis. The metastatic carcinoma in the mesenteric lymph " nodes (specimen C) shows variable morphology, including relatively low grade tumor with papillary architecture, and high grade tumor comprised of nests of tumor cells with irregular, slit-like spaces and marked nuclear pleomorphism.    5/29/14: Cycle 1 IV PACLitaxel / IP CISplatin / IP PACLitaxel.  - 28.  6/26/14: Cycle #2 IV/IP.  10  8/5/14: CT chest/abd/pelvis IMPRESSION     1. In this patient with ovarian cancer, overall findings are indicative of stable/slight improvement, as multiple mesenteric lymphadenopathy and scattered nodular peritoneal soft tissue mass lesions appear unchanged or slightly smaller since 4/21/2014.    2. 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 Mild dilatation of the second and the third portion of the duodenum with a narrow SMA angle. This could represent SMA syndrome, if clinically correlated.17/14:    Cycle #3 IV/IP.  10.    8/7/14: Cycle #4 Taxol/Carbo (changed from IV/IP).  10. She has been feeling okay. She is unsure if she can finish out the course of 6 cycles IV/IP taxol/cisplatin. She feels like she has the flu for about a week then starts feeling gradually better after each chemo cycle. Her spouse notes that she actually has been more sick with the treatments than she initially admits here. She was also previously having some rib pain. Denies any rib pain now. Denies any chest pain or shortness of breath.  8/20/14: Remove Intraperitoneal Port ( Port and catheter intact - discarded)  8/28/14: Cycle #5 Taxol/Carbo held due to thrombocytopenia.  6.    9/29/14: Cycle #6 Taxol/Carbo  6. Insurance questions regarding GSF coverage today. No concerns other than fatigue. Taking iron for anemia and does not desire blood transfusion. Using neulasta for neutropenia. Using home IV hydration if needed. Baseline unchanged. No abdominal bloating, constipation, diarrhea, pain, vaginal or rectal bleeding, cough or dyspnea, fluid retention.      10/20/14:  5. CT chest/abdomen/pelvis on 10/16/14 showed nodular peritoneal soft tissue mass in the right lower quadrant adjacent to the psoas muscle is no longer appreciated. Adjacent prominent lymphadenopathy is unchanged from previous exam. 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  1/27/15:  6.  4/28/15:  14.  5/26/15:  18.    6/2/15: CT cap Impression:  1. Postsurgical changes of hysterectomy and bilateral salpingo-oophorectomy for ovarian cancer. There is a new 8 mm hazy, ill-defined hypoattenuating lesion in hepatic segment 6 which is suspicious for a metastatic deposit. Further evaluation with ultrasound in recommended.    2. Increased size of a left retroperitoneal lymph node which is indeterminate but may represent a ciro metastasis. Mildly prominent lymph nodes in the right lower quadrant are not significantly changed.  3. Moderate colonic stool burden.    6/4/15: US abdomen IMPRESSION:    Hyperechoic lesion in the right hepatic lobe, consistent with hemangioma. This does not corresponds to the area of the lesion seen on CT from 6/2/2015. An MR would be helpful for identifying and characterizing the lesion from the recent CT.  6/12/15: MR abd IMPRESSION:  1. New 20 x 11 mm enhancing lesions between the right obliques, concerning for metastatic disease. This lesions should be amenable to percutaneous biopsy, if indicated.  2. Correlating to the lesion visualized on comparison CT is a hepatic segment 6 subcapsular 7 mm lesion. Overall the appearance favors the diagnosis of a simple cyst. However, there is faint suggestion of mild peripheral arterial enhancement. Although this is favored as  artifactual, this should be followed up to confirm stability. Recommend 6 month followup.  3. Hepatic segment 6, 5 mm lesion too small to technically characterize. Differential would favor FNH, less likely flash filling hemangioma. Recommend  attention on followup.    9/1/15: Cycle #1 Avastin/Cytoxan.   31.  9/24/15: Cycle #2 Avastin/Cytoxan.  19.  10/15/15: Cycle #3 Avastin/Cytoxan.  16.     11/6/15: CT c/a/p IMPRESSION:    1. Stable postoperative change of MAR/BSO for ovarian cancer.  2. The lesion in the right flank abdominal musculature is slightly decreased in size. Otherwise, stable examination.  2. No evidence of metastatic disease in the chest.    11/20/15:  16    11/25/15: surgical pathology report  FINAL DIAGNOSIS:  Soft tissue, right oblique muscle mass, excision:  -Recurrent ovarian serous carcinoma  -Carcinoma is present less than 1 mm from one resection margin  -Background skeletal muscle and fibroadipose tissue    1/4/16:  22  1/11/16-1/27/16: Radiation to right flank x 12 treatments    4/7/2016:  94. CT cap IMPRESSION:    In this patient with ovarian cancer status post MAR/BSO and descending/transverse colectomy:  1. No evidence for malignancy in the chest, abdomen, or pelvis.  2. Stable small hypodense segment 6 liver lesion, appears more likely benign, possibly a cyst.    5/13/16:  124.    6/3/16: PET CT IMPRESSION: In this patient with a history of ovarian cancer:  1. Hypermetabolic and enlarging periaortic and perihepatic lymphadenopathy compatible with metastatic disease, as detailed above.  2. Although hypodense lesion in hepatic segment 6 has been present since 6/2/2015 associated hypermetabolism makes this lesion highly concerning for metastatic disease.    6/9/16:  137.  6/14/16: Cycle #1 Niraparib.    6/28/16: Cycle #1 D15 Niraparib.    7/5/16:  100.    7/11/16: Cycle #2 Niraparib.   83.    8/3/2016: PET CT IMPRESSION:    In this patient with known history of ovarian cancer:  1) New pleural based nodular opacities in the lateral and inferior aspects of the bilateral lower lobes, worse in the left lung. Likely infection. Close follow up is recommended.      2) Slight  decrease in hypermetabolic abdominal lymphadenopathy. 2 hypermetabolic lymph nodes persist.  3) Unchanged right hepatic lobe metastatic lesion.     8/9/16: Cycle #3 Niraparib.  69.  9/6/16: Cycle #4 Niraparib.  53. Dose held due to anemia.  9/13/16: Eval for potential cycle 4 niraparib. Dose held due to anemia.    10/4/16: CT CAP impression:   In this patient with a known history of ovarian cancer:  1. There has been interval resolution of pleural-based nodular  opacities which likely represented infection.  2. Abdominal lymphadenopathy in the form of 2 portacaval lymph nodes  have not significantly changed in size, noted to be hypermetabolic on  prior PET/CT.  3. Previously demonstrated metastatic lesion in the right lobe of the  liver is not significantly changed.  10/11/16:  60  11/1/16: C6 niraparib,  75  11/29/16: C7 niraparib.  78. CT CAP impression as follows:  Target lesions (RECIST criteria):    A previously described target lesion superior to the head of the  pancreas (series 2, image 64)  (referred to as a perihepatic node on  6/3/2016) may not be a valid target lesion because it measured less  than 1.5 cm originally. However, this particular node has decreased in  size, now measuring 7 mm in short axis versus 14 mm on 6/3/2016 when  measured in a similar fashion.    2.3 cm short axis portacaval lymph node on series 2 image 67,  previously 2.0 cm on 10/4/2016.    1.2 cm subtle hypodensity in hepatic segment 6 on series 2 image 75,  stable on multiple studies since at least 6/3/2016  Sum of diameters today: 3.5 cm. Sum of diameters 10/4/2016: 3.2 cm.  Growth = 9%.      12/27/16: C8 niraparib.  105.  1/25/17: C9 niraparib.  108    2/20/17: CT CAP Impression   IMPRESSION:   1. In this patient with history of ovarian cancer there is stable  disease by RECIST criteria as evidenced by:    1a. Mildly increased size of liver metastasis.   1b. Stable portacaval  lymphadenopathy.   1c. No evidence of metastatic disease in the chest.  2. Trace emphysematous changes of the lungs.    2/23/17:  pending, C10 niraparib  CT CAP impression:  Sum of target lesion diameters today: 3.7 cm. Sum of target lesion  diameters on 11/28/2016: 3.5 cm. Growth= 6%  1. In this patient with history of ovarian cancer there is stable  disease by RECIST criteria as evidenced by:   1a. Mildly increased size of liver metastasis.  1b. Stable portacaval lymphadenopathy.  1c. No evidence of metastatic disease in the chest.  2. Trace emphysematous changes of the lungs.  3/22/17: C11 niraparib.  132.  4/19/17: C12 niraparib.  127.    5/16/17: CT CAP  IMPRESSION: In this patient with ovarian cancer:  1. Mildly increased size of hepatic metastasis segment 6 with subtle  increased capsular retraction.  2. Stable edmundo hepatis nodes, with mild increase in size of  periaortic lymph nodes.  3. Prominent left supraclavicular lymph node with subtle increase in  size compared to prior studies, particularly comparing to 10/4/2016.  4. Mild subtle groundglass opacities in the right upper lobe, not  present on prior study, lesser extent in the right lower lobe.  Findings may represent infection, additional consideration is  malignancy (less likely), and attention on follow-up study  Recommended.    5/18/17:  pending     Past Medical History:   Diagnosis Date     Antiplatelet or antithrombotic long-term use      Ascites      Blood clot in the legs      Diabetes (H)      Ovarian cancer (H)     serous,stg IV     Pleural effusion      Pulmonary embolism (H) 2/2012     Refusal of blood transfusions as patient is Bahai      Short gut syndrome      Subclinical hypothyroidism 4/18/2013     Thrombosis of leg        Past Surgical History:   Procedure Laterality Date     COLECTOMY       COLONOSCOPY  2/1/2012    Procedure:COLONOSCOPY; With Biopsy; Surgeon:TONI SULLIVAN; Location: OR      HYSTERECTOMY TOTAL ABD, RHIANNA SALPINGO-OOPHORECTOMY, NODE DISSECTION, TUMOR DEBULKING, COMBINED  2/1/2012    Procedure:COMBINED HYSTERECTOMY TOTAL ABDOMINAL, BILATERAL SALPINGO-OOPHORECTOMY, NODE DISSECTION, TUMOR DEBULKING;  Exploratory Laparotomy, Total Abdominal Hysterectomy, Bilateral Salpingo-Oophorectomy, appendectomy,lysis of adhesions, ileal, ascending, transverse and splenic flexure resection, ileal descending bowel renanastomosis, incidental cystotomy repair, CUSA procedure and colonoscopy ; [facility]     INSERT PORT PERITONEAL ACCESS  4/3/2012    Procedure:INSERT PORT PERITONEAL ACCESS; Intraperitoneal Port Placement (c-arm); Surgeon:SAMUEL CARRASCO; Location:[facility] OR     INSERT PORT PERITONEAL ACCESS  5/14/2014    Procedure: INSERT PORT PERITONEAL ACCESS;  Surgeon: Nga Yeung MD;  Location: [facility] OR     INSERT PORT VASCULAR ACCESS       LAPAROSCOPY DIAGNOSTIC (GYN)  5/14/2014    Procedure: LAPAROSCOPY DIAGNOSTIC (GYN);  Surgeon: Nga Yeung MD;  Location: [facility] OR     LAPAROTOMY EXPLORATORY Right 11/25/2015    Procedure: LAPAROTOMY EXPLORATORY;  Surgeon: Nga Yeung MD;  Location: [facility] OR     LAPAROTOMY, TUMOR DEBULKING, COMBINED  5/14/2014    Procedure: COMBINED LAPAROTOMY, TUMOR DEBULKING;  Surgeon: Nga Yeung MD;  Location: [facility] OR     REMOVE CATHETER PERITONEAL N/A 8/20/2014    Procedure: REMOVE CATHETER PERITONEAL;  Surgeon: Nga Yeung MD;  Location: [facility] OR     VASCULAR SURGERY      stent left iliac vein       Current Outpatient Prescriptions   Medication     study - niraparib (IDS# 4759) 100 mg capsule     ferrous sulfate (IRON) 325 (65 FE) MG tablet     study - niraparib (IDS# [id number]) 100 mg capsule     LEVOTHYROXINE SODIUM PO     order for DME     LORazepam (ATIVAN) 1 MG tablet     METFORMIN HCL PO     diphenoxylate-atropine (LOMOTIL) 2.5-0.025 MG per tablet     cyanocobalamin (VITAMIN B12) 1000 MCG/ML injection     magnesium oxide (MAG-OX) 400 MG tablet     ASPIRIN  "PO     potassium chloride (KLOR-CON) 20 MEQ packet     VITAMIN E NATURAL PO     Cholecalciferol (VITAMIN D PO)     HERBALS     Ascorbic Acid (VITAMIN C PO)     Calcium Carbonate-Vitamin D (CALCIUM + D PO)     study - niraparib (IDS# [id number]) 100 mg capsule     Current Facility-Administered Medications   Medication     heparin 100 UNIT/ML injection 5 mL     Facility-Administered Medications Ordered in Other Visits   Medication     heparin 100 UNIT/ML injection 500 Units          Allergies   Allergen Reactions     Nkda [No Known Drug Allergies]        Family History   Problem Relation Age of Onset     CANCER Mother 69     lung, smoker     CANCER Maternal Uncle 65     brain     Colon Cancer Maternal Aunt 80     colon       Social History     Social History     Marital status:      Spouse name: N/A     Number of children: N/A     Years of education: N/A     Occupational History     Not on file.     Social History Main Topics     Smoking status: Former Smoker     Years: 8.00     Types: Cigarettes     Quit date: 6/21/1980     Smokeless tobacco: Never Used      Comment: started at 9 yo and quit at 18 yo     Alcohol use Yes      Comment: 3x/day wine or jodi     Drug use: No     Sexual activity: Not on file     Other Topics Concern     Not on file     Social History Narrative       Physical Exam:    /69  Pulse 100  Temp 98.6  F (37  C) (Oral)  Resp 16  Ht 1.651 m (5' 5\")  Wt 62.8 kg (138 lb 7.2 oz)  SpO2 99%  BMI 23.04 kg/m2  ECOG score-0    General: Alert non-toxic appearing female in no acute distress  HEENT: Normocephalic, atraumatic; PERRLA; no scleral icterus; oropharynx pink without lesions; neck supple 1cm lymph node palpable in the left supraclav/lower cervical region  Pulmonary: Lungs clear to auscultation, no increased work of breathing noted  Cardiac: Tachycardic, regular rhythm, S1S2, no clicks, murmurs, rubs, or gallops; bilateral lower extremities without edema, dorsalis pedis pulses 2+ " bilaterally  GI: Normoactive bowel sounds x4 quadrants, abdomen soft, non-distended, and non-tender to palpation without masses or organomegaly  : Not indicated  Heme: Cervical, supraclavicular, and inguinal nodes without lymphadenopathy  MSK: Moves all extremities, no obvious muscle wasting  Neuro: No gross deficits, normal gait  Skin: Appropriate color for race, warm and dry, no rashes or lesions to unclothed skin  Psych: Pleasant and interactive, affect bright, makes appropriate eye contact, thought process linear    Labs:  CBC RESULTS:   Recent Labs   Lab Test  05/18/17   1305   WBC  7.2   RBC  3.30*   HGB  12.0   HCT  35.6   MCV  108*   MCH  36.4*   MCHC  33.7   RDW  13.1   PLT  229     Recent Labs   Lab Test  05/18/17   1305  04/19/17   1354   NA  138  141   POTASSIUM  3.5  3.2*   CHLORIDE  103  105   CO2  24  26   ANIONGAP  12  10   GLC  109*  117*   BUN  14  18   CR  0.73  1.04   JOSE ALBERTO  8.9  8.7     Liver Function Studies -   Recent Labs   Lab Test  05/18/17   1305   PROTTOTAL  7.3   ALBUMIN  3.7   BILITOTAL  0.5   ALKPHOS  121   AST  21   ALT  23            Date Value Ref Range Status   04/19/2017 127 (H) 0 - 30 U/mL Final     Comment:     Assay Method:  Chemiluminescence using Siemens Centaur XP   03/22/2017 132 (H) 0 - 30 U/mL Final     Comment:     Assay Method:  Chemiluminescence using Siemens Centaur XP   02/23/2017 132 (H) 0 - 30 U/mL Final     Comment:     Assay Method:  Chemiluminescence using Siemens Centaur XP   01/25/2017 108 (H) 0 - 30 U/mL Final     Comment:     Assay Method:  Chemiluminescence using Siemens Centaur XP   12/27/2016 105 (H) 0 - 30 U/mL Final     Comment:     Assay Method:  Chemiluminescence using Siemens Centaur XP   11/28/2016 78 (H) 0 - 30 U/mL Final     Comment:     Assay Method:  Chemiluminescence using Siemens Centaur XP   11/01/2016 75 (H) 0 - 30 U/mL Final     Comment:     Assay Method:  Chemiluminescence using Siemens Centaur XP   10/11/2016 60 (H) 0 - 30 U/mL  Final     Comment:     Assay Method:  Chemiluminescence using Siemens Centaur XP   10/06/2016 59 (H) 0 - 30 U/mL Final     Comment:     Assay Method:  Chemiluminescence using Siemens Centaur XP   09/06/2016 53 (H) 0 - 30 U/mL Final     Comment:     Assay Method:  Chemiluminescence using Siemens Centaur XP         Assessment/Plan:  1) Recurrent stage IIIC bilateral ovarian cancer: Patient tolerating without dose limiting side effects. Proceed with cycle 13 niraparib. 5/11/17 CT CAP with RECIST shows stable disease with mildly increased size of liver metastasis and Mild subtle groundglass opacities in the right upper lobe.  = 127 as of 4/19/17. Reviewed signs and symptoms for when she should contact the clinic or seek additional care, including but not limited to fever, chills, inability to keep down food or fluids, nausea and vomiting not controlled with antiemetics, and diarrhea leading to dehydration. Patient to contact the clinic with any questions or concerns in the interim. Betzy Jones RN in for teaching and study follow up.    2) Genetic counseling: Testing has been performed and she is negative for mutations in BRCA1, BRCA2, EPCAM, MLH1, MSH2, MSH6, PMS2, PTEN, and TP53 genes    3) Health maintenance issues discussed include to continue following up with PCP for non-gynecologic concerns.    4) Patient verbalized understanding of and agreement with plan    A total of 30 minutes spent with patient, over 50% spent in counseling and coordination of care.    Patient seen and discussed with Dr. Anjana Cast MD  Heme/Onc Fellow      Nga Yeung MD    Department of Ob/Gyn and Women's Health  Division of Gynecologic Oncology  [facility]  [phone]    I saw and evaluated the patient with the fellow. I edited and reviewed the above note.    Patient Care Team:  Aubrie Hester as PCP - General  Nga Yeung MD as MD (Oncology)  Josefina  HAILE Gomez CNP as Nurse Practitioner (Nurse Practitioner)               Patient/Caregiver provided printed discharge information.

## 2023-01-02 NOTE — NURSING NOTE
"Oncology Rooming Note    October 20, 2017 9:06 AM   Odalys Nathan is a 57 year old female who presents for:    Chief Complaint   Patient presents with     Port Draw     labs drawn from port by rn.  vs taken     Oncology Clinic Visit     return patient visit pre-chemo related to Ovarian cancer, right (H)     Initial Vitals: /72  Pulse 82  Temp 98  F (36.7  C) (Oral)  Resp 16  Ht 1.651 m (5' 5\")  Wt 64.7 kg (142 lb 9.6 oz)  SpO2 99%  BMI 23.73 kg/m2 Estimated body mass index is 23.73 kg/(m^2) as calculated from the following:    Height as of this encounter: 1.651 m (5' 5\").    Weight as of this encounter: 64.7 kg (142 lb 9.6 oz). Body surface area is 1.72 meters squared.  No Pain (0) Comment: Data Unavailable   No LMP recorded. Patient has had a hysterectomy.  Allergies reviewed: Yes  Medications reviewed: Yes    Medications: Medication refills not needed today.  Pharmacy name entered into [employer]:    [id number] - [place], [place] - [address] [facility] [facility] - [place] [address]  [id number] - [place], [place] - [address] [facility] [place], [place] - [id number]    Clinical concerns: no concerns jose was notified.    6 minutes for nursing intake (face to face time)     Dre Perdomo CMA              " chest pain

## 2025-02-14 NOTE — Clinical Note
History of Present Illness:  Patient returns today endorsing moderate pain.  Denies any numbness or tingling.  No calf pain, No chest pain or shortness of breath.    Physical Examination:  Mild swelling  Healthy incision, no erythema or drainage  ROM:  5-110  + Plantar/dorsiflexion  Negative Madalyn test    Imaging:  Appropriate position and alignment no evidence of implant failure     Assessment  Patient status post left total knee arthroplasty, doing well    Plan:  Discussed analgesics, encouraging non-opioid modalities.  Encouraged ice, rest.  Discussed importance of ROM/ formal physical therapy.  Reviewed dental prophylaxis.  Follow-up in 1 month for a motion check.     In a face to face encounter, I evaluated the patient and performed a physical examination, discussed pertinent diagnostic studies if indicated and discussed diagnosis and management strategies with both the patient and physician assistant / nurse practitioner.  I reviewed the PA/NP's note and agree with the documented findings and plan of care.    Patient doing well status post left total knee arthroplasty.  She like to discuss her right knee today.  She is has increasing pain swelling and difficulty with ambulation despite therapy injections bracing etc.    Right knee subtle valgus malalignment near full range of motion.    Radiographs of the right knee demonstrate some erosion of the medial tibial plateau with complete loss of joint space.    Plan:\  Discussed continued physical therapy home exercise program the left knee, we reviewed right total knee arthroplasty.  We discussed the procedure and recovery and any modifications from the contralateral side.  She is only taking Tylenol for pain we discussed tramadol before therapy but overall is doing excellent with her recovery.   x-rays from today demonstrate appropriate position alignment.    I have personally reviewed the OARRS report for this patient. This report is scanned into  the  Going to chemo. Will need CT CAP with contrast before [facility] visit. Please switch her 2/23 labs/Avastin to [facility]. Can she get IV fluids at [facility] on 2/16? electronic medical record. I have considered the risks of abuse, dependence, addiction, and diversion. They currently report a pain of 8.      Carlitos Gallagher MD

## 2025-05-13 NOTE — PLAN OF CARE
Status: POD#1 following Right frontal craniotomy for resection of brain tumor  Vitals: soft BPs in 80s-90s Systolic, MDs aware  Neuros: Intact ex slowed cog processing reported by pt.  IV: R chest PAC infusing bolus of 500 ml NS 2/2 hypotension, and then at 75 ml/hr maintenance  Resp/trach: WNL  Diet: Tolerated regular diet without difficulty  Bowel status: BM on 9/17, pt wants to have BM tomorrow morning  : Voiding without difficulty  Skin: Head incision liquid bandaged, intact  Pain: managed effectively with PO oxycodone q3-4h  Activity: up with independence during day, writer informed pt bed alarm will be on at night  Social:  supportive, returned home for night, back in AM  Plan: discharge to home when medically ready. Pt to have possible PICC placed tomorrow as well as chest PAC removed in IR. Per, ID, long-term IV antibiotics still recommended. Pt and  wish to speak to care coordinator before discharge to help arrange further f/u for all services needed.     Psychosis/Impulsivity/Global insomnia

## (undated) DEVICE — DRAPE C-ARM W/STRAPS 42X72" 07-CA104

## (undated) DEVICE — GLOVE PROTEXIS W/NEU-THERA 8.5  2D73TE85

## (undated) DEVICE — KIT ENDO FIRST STEP DISINFECTANT 200ML W/POUCH EP-4

## (undated) DEVICE — ENDO BITE BLOCK ADULT OMNI-BLOC

## (undated) DEVICE — GUIDEWIRE NOVAGOLD .018X260CM STR TIP M00552000

## (undated) DEVICE — SYR 03ML LL W/O NDL 309657

## (undated) DEVICE — SOL WATER IRRIG 1000ML BOTTLE 2F7114

## (undated) DEVICE — ENDO SEAL BX PORT GREEN CS-W7S

## (undated) DEVICE — LINEN TOWEL PACK X6 WHITE 5487

## (undated) DEVICE — CATH URETERAL CONE 08FR 70CM 138008LT / 138008RT

## (undated) DEVICE — PACK CRANIOTOMY

## (undated) DEVICE — Device

## (undated) DEVICE — BIOPSY VALVE BIOSHIELD 00711135

## (undated) DEVICE — SOL NACL 0.9% IRRIG 1000ML BOTTLE 2F7124

## (undated) DEVICE — ENDO SNARE POLYPECTOMY OVAL 15MM LOOP SD-240U-15

## (undated) DEVICE — SPONGE COTTONOID 1/2X1/2" 20-04S

## (undated) DEVICE — SU MONOCRYL 4-0 PS-2 27" UND Y426H

## (undated) DEVICE — PUMP SYSTEM SINGLE ACTION M0067201000

## (undated) DEVICE — PREP CHLORAPREP CLEAR 3ML 260400

## (undated) DEVICE — BLADE CLIPPER SGL USE 9680

## (undated) DEVICE — MARKER SPHERES PASSIVE MEDT PACK 5 8801075

## (undated) DEVICE — SYR 10ML LL W/O NDL 302995

## (undated) DEVICE — ENDO CATH BALLOON DILATION HURRICANE 04MMX4X180CM M00545900

## (undated) DEVICE — PREP POVIDONE IODINE SCRUB 7.5% 4OZ APL82212

## (undated) DEVICE — RAD RX CONRAY 60% (50ML) CHARGE PER ML

## (undated) DEVICE — ENDO SEAL BX PORT BPS-A

## (undated) DEVICE — LINEN TOWEL PACK X30 5481

## (undated) DEVICE — SPONGE COTTONOID 1/2X3" 20-07S

## (undated) DEVICE — DRSG MEPILEX BORDER SACRUM 6.3X7.9" 282055

## (undated) DEVICE — SPONGE COTTONOID 1X3" 20-10S

## (undated) DEVICE — SUCTION MANIFOLD DORNOCH ULTRA CART UL-CL500

## (undated) DEVICE — SOL RINGERS LACTATED 1000ML BAG 07953-09

## (undated) DEVICE — PREP SKIN SCRUB TRAY 4461A

## (undated) DEVICE — COVER CAMERA VIDEO LASER 9X96" 04-CC227

## (undated) DEVICE — INFLATION DEVICE BIG 60 ENDO-AN6012

## (undated) DEVICE — SYR INFLATION DEVICE 20ML W/ 26GA TORQUE DEVICE M001151062

## (undated) DEVICE — PAD CHUX UNDERPAD 30X30"

## (undated) DEVICE — ESU GROUND PAD ADULT W/CORD E7507

## (undated) DEVICE — PERFORATOR 14MM CODMAN

## (undated) DEVICE — SU VICRYL 2-0 CT-2 CR 8X18" J726D

## (undated) DEVICE — KIT CONNECTOR FOR OLYMPUS ENDOSCOPES DEFENDO 100310

## (undated) DEVICE — CATH RETRIEVAL BALLOON EXTRACTOR PRO RX-S INJ ABOVE 9-12MM

## (undated) DEVICE — GLOVE PROTEXIS W/NEU-THERA 8.0  2D73TE80

## (undated) DEVICE — SOL NACL 0.9% IRRIG 3000ML BAG 2B7477

## (undated) DEVICE — RX BACITRACIN OINTMENT 0.9G 1/32OZ CUR001109

## (undated) DEVICE — ESU CORD BIPOLAR AND IRR TUBING AESCULAP US355

## (undated) DEVICE — SYR 30ML LL W/O NDL 302832

## (undated) DEVICE — NDL BLUNT 18GA 1" W/O FILTER 305181

## (undated) DEVICE — PREP POVIDONE IODINE SOLUTION 10% 4OZ

## (undated) DEVICE — CATH URETERAL OPEN END 5FRX70CM M0064002010

## (undated) DEVICE — STRAP UNIVERSAL POSITIONING 2-PIECE 4X47X76" 91-287

## (undated) DEVICE — LINEN TOWEL PACK X5 5464

## (undated) DEVICE — PACK ENDOSCOPY GI CUSTOM UMMC

## (undated) DEVICE — SPONGE SURGIFOAM 100 1974

## (undated) DEVICE — GUIDEWIRE SENSOR DUAL FLEX STR 0.035"X150CM M0066703080

## (undated) DEVICE — DRAPE CRANIOTOMY W/POUCH 9450

## (undated) DEVICE — STENT ZIMMON PANCREA 5FRX15CM SGL PIGTAIL G22978: Type: IMPLANTABLE DEVICE | Site: BILE DUCT | Status: NON-FUNCTIONAL

## (undated) DEVICE — NDL ANGIOCATH 14GA 1.25" 4048

## (undated) DEVICE — ADH FLOSEAL W/HUMAN THROMBIN 5ML W/APPLICATOR TIP ADS201844

## (undated) DEVICE — BUR ROUTER 1.4X12.8MM ANSPACH S-1R

## (undated) DEVICE — ADH SKIN CLOSURE PREMIERPRO EXOFIN 1.0ML 3470

## (undated) DEVICE — CRANIOTOME ADULT ANSPACH A-CRN

## (undated) DEVICE — WIRE GUIDE 0.025"X270CM ANG VISIGLIDE G-240-2527A

## (undated) DEVICE — SPONGE COTTONOID 1/4X1/4" 20-01S

## (undated) DEVICE — CONNECTOR WATER VALVE PERFUSION PACK STR 020272801

## (undated) DEVICE — SPONGE COTTONOID 1/2X1 1/2" 20-06S

## (undated) DEVICE — DRAPE MICROSCOPE LEICA 54X150" AR8033650

## (undated) DEVICE — GOWN XLG DISP 9545

## (undated) DEVICE — PACK CYSTO CUSTOM ASC

## (undated) DEVICE — WIPES FOLEY CARE SURESTEP PROVON DFC100

## (undated) DEVICE — ENDO FUSION OMNI-TOME 21 FS-OMNI-21 G48675

## (undated) DEVICE — PAD CHUX UNDERPAD 23X24" 7136

## (undated) DEVICE — DRSG GAUZE 4X4" TRAY 6939

## (undated) DEVICE — SURGICEL HEMOSTAT 4X8" 1952

## (undated) DEVICE — DECANTER BAG 2002S

## (undated) DEVICE — ENDO TUBING CO2 SMARTCAP STERILE DISP 100145CO2EXT

## (undated) DEVICE — DRAPE MAYO STAND 23X54 8337

## (undated) DEVICE — ENDO FUSION OMNI-TOME G31903

## (undated) DEVICE — SYR EAR BULB 3OZ 0035830

## (undated) DEVICE — SU NUROLON 4-0 TF CR 8X18" C584D

## (undated) DEVICE — DRAPE POUCH INSTRUMENT 1018

## (undated) DEVICE — WIRE GUIDE 0.025"X270CM STR VISIGLIDE G-240-2527S

## (undated) DEVICE — WIRE GUIDE TRACER METRO DIRECT .021"X260CM STR TIP G55705

## (undated) DEVICE — CLIP RANEY

## (undated) DEVICE — PACK CYSTO UMMC CUSTOM

## (undated) DEVICE — PIN SKULL MAYFIELD ADULT TITANIUM 3/PK A1120

## (undated) DEVICE — SUCTION MANIFOLD NEPTUNE 2 SYS 1 PORT 702-025-000

## (undated) RX ORDER — FENTANYL CITRATE 50 UG/ML
INJECTION, SOLUTION INTRAMUSCULAR; INTRAVENOUS
Status: DISPENSED
Start: 2019-01-01

## (undated) RX ORDER — DEXAMETHASONE SODIUM PHOSPHATE 4 MG/ML
INJECTION, SOLUTION INTRA-ARTICULAR; INTRALESIONAL; INTRAMUSCULAR; INTRAVENOUS; SOFT TISSUE
Status: DISPENSED
Start: 2019-01-01

## (undated) RX ORDER — PROPOFOL 10 MG/ML
INJECTION, EMULSION INTRAVENOUS
Status: DISPENSED
Start: 2019-01-01

## (undated) RX ORDER — PHENYLEPHRINE HCL IN 0.9% NACL 1 MG/10 ML
SYRINGE (ML) INTRAVENOUS
Status: DISPENSED
Start: 2019-01-01

## (undated) RX ORDER — CEFAZOLIN SODIUM 2 G/100ML
INJECTION, SOLUTION INTRAVENOUS
Status: DISPENSED
Start: 2019-01-01

## (undated) RX ORDER — ONDANSETRON 2 MG/ML
INJECTION INTRAMUSCULAR; INTRAVENOUS
Status: DISPENSED
Start: 2019-01-01

## (undated) RX ORDER — POTASSIUM CL/LIDO/0.9 % NACL 10MEQ/0.1L
INTRAVENOUS SOLUTION, PIGGYBACK (ML) INTRAVENOUS
Status: DISPENSED
Start: 2019-01-01

## (undated) RX ORDER — CEFAZOLIN SODIUM 1 G/3ML
INJECTION, POWDER, FOR SOLUTION INTRAMUSCULAR; INTRAVENOUS
Status: DISPENSED
Start: 2019-01-01

## (undated) RX ORDER — HEPARIN SODIUM (PORCINE) LOCK FLUSH IV SOLN 100 UNIT/ML 100 UNIT/ML
SOLUTION INTRAVENOUS
Status: DISPENSED
Start: 2018-06-20

## (undated) RX ORDER — SCOLOPAMINE TRANSDERMAL SYSTEM 1 MG/1
PATCH, EXTENDED RELEASE TRANSDERMAL
Status: DISPENSED
Start: 2019-01-01

## (undated) RX ORDER — SODIUM CHLORIDE 9 MG/ML
INJECTION, SOLUTION INTRAVENOUS
Status: DISPENSED
Start: 2019-01-01

## (undated) RX ORDER — BACITRACIN 50000 [IU]/1
INJECTION, POWDER, FOR SOLUTION INTRAMUSCULAR
Status: DISPENSED
Start: 2019-01-01

## (undated) RX ORDER — ALBUTEROL SULFATE 0.83 MG/ML
SOLUTION RESPIRATORY (INHALATION)
Status: DISPENSED
Start: 2019-01-01

## (undated) RX ORDER — LIDOCAINE HYDROCHLORIDE 10 MG/ML
INJECTION, SOLUTION EPIDURAL; INFILTRATION; INTRACAUDAL; PERINEURAL
Status: DISPENSED
Start: 2019-01-01

## (undated) RX ORDER — LIDOCAINE HYDROCHLORIDE 20 MG/ML
INJECTION, SOLUTION EPIDURAL; INFILTRATION; INTRACAUDAL; PERINEURAL
Status: DISPENSED
Start: 2019-01-01

## (undated) RX ORDER — CIPROFLOXACIN 2 MG/ML
INJECTION, SOLUTION INTRAVENOUS
Status: DISPENSED
Start: 2019-01-01

## (undated) RX ORDER — CALCIUM CHLORIDE 100 MG/ML
INJECTION INTRAVENOUS; INTRAVENTRICULAR
Status: DISPENSED
Start: 2019-01-01

## (undated) RX ORDER — ALBUMIN, HUMAN INJ 5% 5 %
SOLUTION INTRAVENOUS
Status: DISPENSED
Start: 2019-01-01

## (undated) RX ORDER — BUPIVACAINE HYDROCHLORIDE AND EPINEPHRINE 5; 5 MG/ML; UG/ML
INJECTION, SOLUTION EPIDURAL; INTRACAUDAL; PERINEURAL
Status: DISPENSED
Start: 2019-01-01

## (undated) RX ORDER — LIDOCAINE 40 MG/G
CREAM TOPICAL
Status: DISPENSED
Start: 2019-01-01

## (undated) RX ORDER — EPHEDRINE SULFATE 50 MG/ML
INJECTION, SOLUTION INTRAMUSCULAR; INTRAVENOUS; SUBCUTANEOUS
Status: DISPENSED
Start: 2019-01-01

## (undated) RX ORDER — ACETAMINOPHEN 325 MG/1
TABLET ORAL
Status: DISPENSED
Start: 2019-01-01

## (undated) RX ORDER — LIDOCAINE HYDROCHLORIDE 20 MG/ML
JELLY TOPICAL
Status: DISPENSED
Start: 2019-01-01

## (undated) RX ORDER — OXYCODONE HYDROCHLORIDE 5 MG/1
TABLET ORAL
Status: DISPENSED
Start: 2019-01-01

## (undated) RX ORDER — GLYCOPYRROLATE 0.2 MG/ML
INJECTION, SOLUTION INTRAMUSCULAR; INTRAVENOUS
Status: DISPENSED
Start: 2019-01-01

## (undated) RX ORDER — SODIUM CHLORIDE, SODIUM LACTATE, POTASSIUM CHLORIDE, CALCIUM CHLORIDE 600; 310; 30; 20 MG/100ML; MG/100ML; MG/100ML; MG/100ML
INJECTION, SOLUTION INTRAVENOUS
Status: DISPENSED
Start: 2019-01-01

## (undated) RX ORDER — CEFTRIAXONE 2 G/1
INJECTION, POWDER, FOR SOLUTION INTRAMUSCULAR; INTRAVENOUS
Status: DISPENSED
Start: 2019-01-01

## (undated) RX ORDER — HEPARIN SODIUM (PORCINE) LOCK FLUSH IV SOLN 100 UNIT/ML 100 UNIT/ML
SOLUTION INTRAVENOUS
Status: DISPENSED
Start: 2018-03-02